# Patient Record
Sex: FEMALE | Race: BLACK OR AFRICAN AMERICAN | NOT HISPANIC OR LATINO | Employment: OTHER | URBAN - METROPOLITAN AREA
[De-identification: names, ages, dates, MRNs, and addresses within clinical notes are randomized per-mention and may not be internally consistent; named-entity substitution may affect disease eponyms.]

---

## 2023-01-01 ENCOUNTER — OFFICE VISIT (OUTPATIENT)
Dept: PRIMARY CARE | Facility: CLINIC | Age: 33
End: 2023-01-01
Payer: COMMERCIAL

## 2023-01-01 ENCOUNTER — TELEPHONE (OUTPATIENT)
Dept: TRANSPLANT | Facility: HOSPITAL | Age: 33
End: 2023-01-01
Payer: COMMERCIAL

## 2023-01-01 ENCOUNTER — HOSPITAL ENCOUNTER (OUTPATIENT)
Dept: DATA CONVERSION | Facility: HOSPITAL | Age: 33
End: 2023-06-14
Attending: INTERNAL MEDICINE | Admitting: INTERNAL MEDICINE
Payer: COMMERCIAL

## 2023-01-01 ENCOUNTER — LAB (OUTPATIENT)
Dept: LAB | Facility: LAB | Age: 33
End: 2023-01-01
Payer: COMMERCIAL

## 2023-01-01 ENCOUNTER — PHARMACY VISIT (OUTPATIENT)
Dept: PHARMACY | Facility: CLINIC | Age: 33
End: 2023-01-01
Payer: COMMERCIAL

## 2023-01-01 ENCOUNTER — OFFICE VISIT (OUTPATIENT)
Dept: RHEUMATOLOGY | Facility: CLINIC | Age: 33
End: 2023-01-01
Payer: COMMERCIAL

## 2023-01-01 ENCOUNTER — TELEPHONE (OUTPATIENT)
Dept: PHARMACY | Facility: HOSPITAL | Age: 33
End: 2023-01-01
Payer: COMMERCIAL

## 2023-01-01 ENCOUNTER — APPOINTMENT (OUTPATIENT)
Dept: TRANSPLANT | Facility: HOSPITAL | Age: 33
End: 2023-01-01

## 2023-01-01 ENCOUNTER — HOSPITAL ENCOUNTER (OUTPATIENT)
Dept: CARDIOLOGY | Facility: HOSPITAL | Age: 33
Discharge: HOME | End: 2023-11-29
Payer: COMMERCIAL

## 2023-01-01 ENCOUNTER — HOSPITAL ENCOUNTER (OUTPATIENT)
Dept: DATA CONVERSION | Facility: HOSPITAL | Age: 33
End: 2023-09-27
Attending: INTERNAL MEDICINE | Admitting: INTERNAL MEDICINE
Payer: COMMERCIAL

## 2023-01-01 ENCOUNTER — SPECIALTY PHARMACY (OUTPATIENT)
Dept: PHARMACY | Facility: CLINIC | Age: 33
End: 2023-01-01

## 2023-01-01 ENCOUNTER — OFFICE VISIT (OUTPATIENT)
Dept: TRANSPLANT | Facility: HOSPITAL | Age: 33
End: 2023-01-01
Payer: COMMERCIAL

## 2023-01-01 ENCOUNTER — APPOINTMENT (OUTPATIENT)
Dept: TRANSPLANT | Facility: HOSPITAL | Age: 33
End: 2023-01-01
Payer: COMMERCIAL

## 2023-01-01 ENCOUNTER — DOCUMENTATION (OUTPATIENT)
Dept: TRANSPLANT | Facility: HOSPITAL | Age: 33
End: 2023-01-01
Payer: COMMERCIAL

## 2023-01-01 ENCOUNTER — PHARMACY VISIT (OUTPATIENT)
Dept: PHARMACY | Facility: CLINIC | Age: 33
End: 2023-01-01

## 2023-01-01 ENCOUNTER — HOSPITAL ENCOUNTER (OUTPATIENT)
Facility: HOSPITAL | Age: 33
Discharge: HOME | End: 2023-11-29
Attending: INTERNAL MEDICINE | Admitting: INTERNAL MEDICINE
Payer: COMMERCIAL

## 2023-01-01 ENCOUNTER — LAB (OUTPATIENT)
Dept: LAB | Facility: HOSPITAL | Age: 33
End: 2023-01-01
Payer: COMMERCIAL

## 2023-01-01 ENCOUNTER — TELEPHONE (OUTPATIENT)
Dept: TRANSPLANT | Facility: HOSPITAL | Age: 33
End: 2023-01-01

## 2023-01-01 VITALS
SYSTOLIC BLOOD PRESSURE: 120 MMHG | BODY MASS INDEX: 38.7 KG/M2 | DIASTOLIC BLOOD PRESSURE: 88 MMHG | TEMPERATURE: 97.1 F | OXYGEN SATURATION: 99 % | HEART RATE: 100 BPM | WEIGHT: 204.8 LBS

## 2023-01-01 VITALS
WEIGHT: 201.2 LBS | HEART RATE: 69 BPM | OXYGEN SATURATION: 100 % | DIASTOLIC BLOOD PRESSURE: 70 MMHG | TEMPERATURE: 97.6 F | SYSTOLIC BLOOD PRESSURE: 111 MMHG | BODY MASS INDEX: 38.02 KG/M2

## 2023-01-01 VITALS
HEIGHT: 61 IN | WEIGHT: 203 LBS | SYSTOLIC BLOOD PRESSURE: 112 MMHG | BODY MASS INDEX: 38.33 KG/M2 | DIASTOLIC BLOOD PRESSURE: 86 MMHG | HEART RATE: 111 BPM

## 2023-01-01 VITALS
OXYGEN SATURATION: 95 % | BODY MASS INDEX: 37.73 KG/M2 | SYSTOLIC BLOOD PRESSURE: 111 MMHG | HEART RATE: 110 BPM | DIASTOLIC BLOOD PRESSURE: 84 MMHG | TEMPERATURE: 97.2 F | WEIGHT: 205 LBS | HEIGHT: 62 IN

## 2023-01-01 DIAGNOSIS — E78.5 HYPERLIPIDEMIA, UNSPECIFIED: ICD-10-CM

## 2023-01-01 DIAGNOSIS — Z12.4 CERVICAL CANCER SCREENING: ICD-10-CM

## 2023-01-01 DIAGNOSIS — D84.9 IMMUNOSUPPRESSION (MULTI): Primary | ICD-10-CM

## 2023-01-01 DIAGNOSIS — T86.21 HEART TRANSPLANT REJECTION (MULTI): ICD-10-CM

## 2023-01-01 DIAGNOSIS — B37.31 YEAST VAGINITIS: ICD-10-CM

## 2023-01-01 DIAGNOSIS — Z94.1 HEART REPLACED BY TRANSPLANT (MULTI): ICD-10-CM

## 2023-01-01 DIAGNOSIS — R35.0 URINARY FREQUENCY: ICD-10-CM

## 2023-01-01 DIAGNOSIS — Z48.21 ENCOUNTER FOR AFTERCARE FOLLOWING HEART TRANSPLANT (MULTI): ICD-10-CM

## 2023-01-01 DIAGNOSIS — Z94.1 HEART TRANSPLANT STATUS (MULTI): ICD-10-CM

## 2023-01-01 DIAGNOSIS — N39.0 URINARY TRACT INFECTION WITHOUT HEMATURIA, SITE UNSPECIFIED: ICD-10-CM

## 2023-01-01 DIAGNOSIS — I10 ESSENTIAL HYPERTENSION: ICD-10-CM

## 2023-01-01 DIAGNOSIS — M06.9 RHEUMATOID ARTHRITIS, INVOLVING UNSPECIFIED SITE, UNSPECIFIED WHETHER RHEUMATOID FACTOR PRESENT (MULTI): Primary | ICD-10-CM

## 2023-01-01 DIAGNOSIS — Z94.1 HEART TRANSPLANT STATUS (MULTI): Primary | ICD-10-CM

## 2023-01-01 DIAGNOSIS — Z23 IMMUNIZATION DUE: ICD-10-CM

## 2023-01-01 DIAGNOSIS — Z82.49 FAMILY HISTORY OF ISCHEMIC HEART DISEASE AND OTHER DISEASES OF THE CIRCULATORY SYSTEM: ICD-10-CM

## 2023-01-01 DIAGNOSIS — I10 ESSENTIAL (PRIMARY) HYPERTENSION: ICD-10-CM

## 2023-01-01 DIAGNOSIS — Z94.1 HEART REPLACED BY TRANSPLANT (MULTI): Primary | ICD-10-CM

## 2023-01-01 DIAGNOSIS — Z00.00 HEALTHCARE MAINTENANCE: Primary | ICD-10-CM

## 2023-01-01 DIAGNOSIS — G89.29 CHRONIC IDIOPATHIC PAIN SYNDROME: Primary | ICD-10-CM

## 2023-01-01 DIAGNOSIS — E66.9 OBESITY (BMI 30-39.9): ICD-10-CM

## 2023-01-01 LAB
ADRENOCORTICOTROPIC HORMONE: 19.5 PG/ML (ref 7.2–63.3)
ALANINE AMINOTRANSFERASE (SGPT) (U/L) IN SER/PLAS: 6 U/L (ref 7–45)
ALANINE AMINOTRANSFERASE (SGPT) (U/L) IN SER/PLAS: 7 U/L (ref 7–45)
ALANINE AMINOTRANSFERASE (SGPT) (U/L) IN SER/PLAS: 8 U/L (ref 7–45)
ALBUMIN (G/DL) IN SER/PLAS: 3.7 G/DL (ref 3.4–5)
ALBUMIN (G/DL) IN SER/PLAS: 3.7 G/DL (ref 3.4–5)
ALBUMIN (G/DL) IN SER/PLAS: 3.9 G/DL (ref 3.4–5)
ALBUMIN (G/DL) IN SER/PLAS: 3.9 G/DL (ref 3.4–5)
ALBUMIN (G/DL) IN SER/PLAS: 4 G/DL (ref 3.4–5)
ALBUMIN (G/DL) IN SER/PLAS: 4.1 G/DL (ref 3.4–5)
ALBUMIN (G/DL) IN SER/PLAS: 4.2 G/DL (ref 3.4–5)
ALBUMIN (G/DL) IN SER/PLAS: 4.3 G/DL (ref 3.4–5)
ALBUMIN (G/DL) IN SER/PLAS: 4.3 G/DL (ref 3.4–5)
ALBUMIN (G/DL) IN SER/PLAS: 4.4 G/DL (ref 3.4–5)
ALBUMIN SERPL BCP-MCNC: 3.2 G/DL (ref 3.4–5)
ALBUMIN SERPL BCP-MCNC: 3.9 G/DL (ref 3.4–5)
ALBUMIN SERPL BCP-MCNC: 4.2 G/DL (ref 3.4–5)
ALBUMIN SERPL BCP-MCNC: 4.3 G/DL (ref 3.4–5)
ALKALINE PHOSPHATASE (U/L) IN SER/PLAS: 38 U/L (ref 33–110)
ALKALINE PHOSPHATASE (U/L) IN SER/PLAS: 42 U/L (ref 33–110)
ALKALINE PHOSPHATASE (U/L) IN SER/PLAS: 44 U/L (ref 33–110)
ALKALINE PHOSPHATASE (U/L) IN SER/PLAS: 48 U/L (ref 33–110)
ALKALINE PHOSPHATASE (U/L) IN SER/PLAS: 49 U/L (ref 33–110)
ALKALINE PHOSPHATASE (U/L) IN SER/PLAS: 50 U/L (ref 33–110)
ALKALINE PHOSPHATASE (U/L) IN SER/PLAS: 50 U/L (ref 33–110)
ALKALINE PHOSPHATASE (U/L) IN SER/PLAS: 55 U/L (ref 33–110)
ALKALINE PHOSPHATASE (U/L) IN SER/PLAS: 57 U/L (ref 33–110)
ALP SERPL-CCNC: 43 U/L (ref 33–110)
ALP SERPL-CCNC: 44 U/L (ref 33–110)
ALP SERPL-CCNC: 46 U/L (ref 33–110)
ALP SERPL-CCNC: 50 U/L (ref 33–110)
ALT SERPL W P-5'-P-CCNC: 7 U/L (ref 7–45)
ALT SERPL W P-5'-P-CCNC: 8 U/L (ref 7–45)
ALT SERPL W P-5'-P-CCNC: 9 U/L (ref 7–45)
ALT SERPL W P-5'-P-CCNC: 9 U/L (ref 7–45)
ANION GAP IN SER/PLAS: 10 MMOL/L (ref 10–20)
ANION GAP IN SER/PLAS: 11 MMOL/L (ref 10–20)
ANION GAP IN SER/PLAS: 12 MMOL/L (ref 10–20)
ANION GAP IN SER/PLAS: 13 MMOL/L (ref 10–20)
ANION GAP IN SER/PLAS: 13 MMOL/L (ref 10–20)
ANION GAP IN SER/PLAS: 14 MMOL/L (ref 10–20)
ANION GAP IN SER/PLAS: 15 MMOL/L (ref 10–20)
ANION GAP SERPL CALC-SCNC: 12 MMOL/L (ref 10–20)
ANION GAP SERPL CALC-SCNC: 12 MMOL/L (ref 10–20)
ANION GAP SERPL CALC-SCNC: 13 MMOL/L (ref 10–20)
ANION GAP SERPL CALC-SCNC: 15 MMOL/L (ref 10–20)
AORTIC VALVE PEAK VELOCITY: 1.08
APPEARANCE UR: ABNORMAL
ASPARTATE AMINOTRANSFERASE (SGOT) (U/L) IN SER/PLAS: 10 U/L (ref 9–39)
ASPARTATE AMINOTRANSFERASE (SGOT) (U/L) IN SER/PLAS: 11 U/L (ref 9–39)
ASPARTATE AMINOTRANSFERASE (SGOT) (U/L) IN SER/PLAS: 12 U/L (ref 9–39)
ASPARTATE AMINOTRANSFERASE (SGOT) (U/L) IN SER/PLAS: 13 U/L (ref 9–39)
ASPARTATE AMINOTRANSFERASE (SGOT) (U/L) IN SER/PLAS: 15 U/L (ref 9–39)
AST SERPL W P-5'-P-CCNC: 12 U/L (ref 9–39)
AST SERPL W P-5'-P-CCNC: 13 U/L (ref 9–39)
AST SERPL W P-5'-P-CCNC: 14 U/L (ref 9–39)
AST SERPL W P-5'-P-CCNC: 16 U/L (ref 9–39)
ATRIAL RATE: 80 BPM
AV PEAK GRADIENT: 4.7
AVA (PEAK VEL): 1.88
BACTERIA #/AREA URNS AUTO: ABNORMAL /HPF
BASOPHILS # BLD AUTO: 0 X10*3/UL (ref 0–0.1)
BASOPHILS # BLD AUTO: 0.03 X10*3/UL (ref 0–0.1)
BASOPHILS # BLD AUTO: 0.04 X10*3/UL (ref 0–0.1)
BASOPHILS (10*3/UL) IN BLOOD BY AUTOMATED COUNT: 0.01 X10E9/L (ref 0–0.1)
BASOPHILS (10*3/UL) IN BLOOD BY AUTOMATED COUNT: 0.01 X10E9/L (ref 0–0.1)
BASOPHILS (10*3/UL) IN BLOOD BY AUTOMATED COUNT: 0.02 X10E9/L (ref 0–0.1)
BASOPHILS (10*3/UL) IN BLOOD BY AUTOMATED COUNT: 0.02 X10E9/L (ref 0–0.1)
BASOPHILS (10*3/UL) IN BLOOD BY AUTOMATED COUNT: 0.03 X10E9/L (ref 0–0.1)
BASOPHILS NFR BLD AUTO: 0 %
BASOPHILS NFR BLD AUTO: 0.7 %
BASOPHILS NFR BLD AUTO: 1.2 %
BASOPHILS/100 LEUKOCYTES IN BLOOD BY AUTOMATED COUNT: 0.2 % (ref 0–2)
BASOPHILS/100 LEUKOCYTES IN BLOOD BY AUTOMATED COUNT: 0.2 % (ref 0–2)
BASOPHILS/100 LEUKOCYTES IN BLOOD BY AUTOMATED COUNT: 0.6 % (ref 0–2)
BASOPHILS/100 LEUKOCYTES IN BLOOD BY AUTOMATED COUNT: 0.6 % (ref 0–2)
BASOPHILS/100 LEUKOCYTES IN BLOOD BY AUTOMATED COUNT: 0.7 % (ref 0–2)
BASOPHILS/100 LEUKOCYTES IN BLOOD BY AUTOMATED COUNT: 0.7 % (ref 0–2)
BASOPHILS/100 LEUKOCYTES IN BLOOD BY AUTOMATED COUNT: 0.9 % (ref 0–2)
BILIRUB SERPL-MCNC: 0.3 MG/DL (ref 0–1.2)
BILIRUB SERPL-MCNC: 0.3 MG/DL (ref 0–1.2)
BILIRUB SERPL-MCNC: 0.4 MG/DL (ref 0–1.2)
BILIRUB SERPL-MCNC: 0.4 MG/DL (ref 0–1.2)
BILIRUB UR STRIP.AUTO-MCNC: NEGATIVE MG/DL
BILIRUBIN TOTAL (MG/DL) IN SER/PLAS: 0.2 MG/DL (ref 0–1.2)
BILIRUBIN TOTAL (MG/DL) IN SER/PLAS: 0.2 MG/DL (ref 0–1.2)
BILIRUBIN TOTAL (MG/DL) IN SER/PLAS: 0.3 MG/DL (ref 0–1.2)
BILIRUBIN TOTAL (MG/DL) IN SER/PLAS: 0.4 MG/DL (ref 0–1.2)
BUN SERPL-MCNC: 13 MG/DL (ref 6–23)
BUN SERPL-MCNC: 14 MG/DL (ref 6–23)
BUN SERPL-MCNC: 15 MG/DL (ref 6–23)
BUN SERPL-MCNC: 18 MG/DL (ref 6–23)
C TRACH RRNA SPEC QL NAA+PROBE: NEGATIVE
CALCIUM (MG/DL) IN SER/PLAS: 8.8 MG/DL (ref 8.6–10.6)
CALCIUM (MG/DL) IN SER/PLAS: 8.8 MG/DL (ref 8.6–10.6)
CALCIUM (MG/DL) IN SER/PLAS: 8.9 MG/DL (ref 8.6–10.6)
CALCIUM (MG/DL) IN SER/PLAS: 9 MG/DL (ref 8.6–10.6)
CALCIUM (MG/DL) IN SER/PLAS: 9.2 MG/DL (ref 8.6–10.6)
CALCIUM (MG/DL) IN SER/PLAS: 9.4 MG/DL (ref 8.6–10.6)
CALCIUM (MG/DL) IN SER/PLAS: 9.4 MG/DL (ref 8.6–10.6)
CALCIUM (MG/DL) IN SER/PLAS: 9.7 MG/DL (ref 8.6–10.6)
CALCIUM SERPL-MCNC: 7.4 MG/DL (ref 8.6–10.6)
CALCIUM SERPL-MCNC: 9 MG/DL (ref 8.6–10.6)
CALCIUM SERPL-MCNC: 9.3 MG/DL (ref 8.6–10.3)
CALCIUM SERPL-MCNC: 9.8 MG/DL (ref 8.6–10.6)
CARBON DIOXIDE, TOTAL (MMOL/L) IN SER/PLAS: 24 MMOL/L (ref 21–32)
CARBON DIOXIDE, TOTAL (MMOL/L) IN SER/PLAS: 25 MMOL/L (ref 21–32)
CARBON DIOXIDE, TOTAL (MMOL/L) IN SER/PLAS: 26 MMOL/L (ref 21–32)
CARBON DIOXIDE, TOTAL (MMOL/L) IN SER/PLAS: 27 MMOL/L (ref 21–32)
CARBON DIOXIDE, TOTAL (MMOL/L) IN SER/PLAS: 27 MMOL/L (ref 21–32)
CARBON DIOXIDE, TOTAL (MMOL/L) IN SER/PLAS: 28 MMOL/L (ref 21–32)
CARBON DIOXIDE, TOTAL (MMOL/L) IN SER/PLAS: 29 MMOL/L (ref 21–32)
CARBON DIOXIDE, TOTAL (MMOL/L) IN SER/PLAS: 30 MMOL/L (ref 21–32)
CHLORIDE (MMOL/L) IN SER/PLAS: 102 MMOL/L (ref 98–107)
CHLORIDE (MMOL/L) IN SER/PLAS: 103 MMOL/L (ref 98–107)
CHLORIDE (MMOL/L) IN SER/PLAS: 104 MMOL/L (ref 98–107)
CHLORIDE (MMOL/L) IN SER/PLAS: 105 MMOL/L (ref 98–107)
CHLORIDE (MMOL/L) IN SER/PLAS: 105 MMOL/L (ref 98–107)
CHLORIDE (MMOL/L) IN SER/PLAS: 106 MMOL/L (ref 98–107)
CHLORIDE SERPL-SCNC: 102 MMOL/L (ref 98–107)
CHLORIDE SERPL-SCNC: 103 MMOL/L (ref 98–107)
CHLORIDE SERPL-SCNC: 105 MMOL/L (ref 98–107)
CHLORIDE SERPL-SCNC: 109 MMOL/L (ref 98–107)
CHOLESTEROL (MG/DL) IN SER/PLAS: 279 MG/DL (ref 0–199)
CHOLESTEROL IN HDL (MG/DL) IN SER/PLAS: 63.4 MG/DL
CHOLESTEROL/HDL RATIO: 4.4
CO2 SERPL-SCNC: 23 MMOL/L (ref 21–32)
CO2 SERPL-SCNC: 25 MMOL/L (ref 21–32)
CO2 SERPL-SCNC: 27 MMOL/L (ref 21–32)
CO2 SERPL-SCNC: 27 MMOL/L (ref 21–32)
COLOR UR: YELLOW
COMPLETE PATHOLOGY REPORT: NORMAL
COMPLETE PATHOLOGY REPORT: NORMAL
CONVERTED CLINICAL DIAGNOSIS-HISTORY: NORMAL
CONVERTED CLINICAL DIAGNOSIS-HISTORY: NORMAL
CONVERTED FINAL DIAGNOSIS: NORMAL
CONVERTED FINAL DIAGNOSIS: NORMAL
CONVERTED FINAL REPORT PDF LINK TO COPY AND PASTE: NORMAL
CONVERTED FINAL REPORT PDF LINK TO COPY AND PASTE: NORMAL
CONVERTED GROSS DESCRIPTION: NORMAL
CONVERTED GROSS DESCRIPTION: NORMAL
CORTISOL (UG/DL) IN SERUM - AM: 1.7 UG/DL (ref 5–20)
CREAT SERPL-MCNC: 0.84 MG/DL (ref 0.5–1.05)
CREAT SERPL-MCNC: 1.01 MG/DL (ref 0.5–1.05)
CREAT SERPL-MCNC: 1.17 MG/DL (ref 0.5–1.05)
CREAT SERPL-MCNC: 1.19 MG/DL (ref 0.5–1.05)
CREAT UR-MCNC: 194.3 MG/DL (ref 20–320)
CREAT UR-MCNC: 195.9 MG/DL (ref 20–320)
CREATININE (MG/DL) IN SER/PLAS: 0.98 MG/DL (ref 0.5–1.05)
CREATININE (MG/DL) IN SER/PLAS: 1.03 MG/DL (ref 0.5–1.05)
CREATININE (MG/DL) IN SER/PLAS: 1.1 MG/DL (ref 0.5–1.05)
CREATININE (MG/DL) IN SER/PLAS: 1.2 MG/DL (ref 0.5–1.05)
CREATININE (MG/DL) IN SER/PLAS: 1.22 MG/DL (ref 0.5–1.05)
CREATININE (MG/DL) IN SER/PLAS: 1.26 MG/DL (ref 0.5–1.05)
CREATININE (MG/DL) IN SER/PLAS: 1.28 MG/DL (ref 0.5–1.05)
CREATININE (MG/DL) IN SER/PLAS: 1.3 MG/DL (ref 0.5–1.05)
CREATININE (MG/DL) IN SER/PLAS: 1.34 MG/DL (ref 0.5–1.05)
CREATININE (MG/DL) IN SER/PLAS: 1.35 MG/DL (ref 0.5–1.05)
CREATININE (MG/DL) IN SER/PLAS: 1.36 MG/DL (ref 0.5–1.05)
CREATININE (MG/DL) IN SER/PLAS: 1.37 MG/DL (ref 0.5–1.05)
CREATININE (MG/DL) IN URINE: 191 MG/DL (ref 20–320)
CYTOLOGY CMNT CVX/VAG CYTO-IMP: NORMAL
CYTOMEGALOVIRUS DNA, PCR COMMENT: NORMAL
CYTOMEGALOVIRUS DNA, PCR IU/ML: NOT DETECTED IU/ML
CYTOMEGALOVIRUS DNA, PCR LOG IU/ML: NORMAL LOG IU/ML
EJECTION FRACTION APICAL 4 CHAMBER: 59.2
EJECTION FRACTION: 55
EOSINOPHIL # BLD AUTO: 0 X10*3/UL (ref 0–0.7)
EOSINOPHIL # BLD AUTO: 0.04 X10*3/UL (ref 0–0.7)
EOSINOPHIL # BLD AUTO: 0.12 X10*3/UL (ref 0–0.7)
EOSINOPHIL NFR BLD AUTO: 0 %
EOSINOPHIL NFR BLD AUTO: 1.2 %
EOSINOPHIL NFR BLD AUTO: 2.6 %
EOSINOPHILS (10*3/UL) IN BLOOD BY AUTOMATED COUNT: 0 X10E9/L (ref 0–0.7)
EOSINOPHILS (10*3/UL) IN BLOOD BY AUTOMATED COUNT: 0.03 X10E9/L (ref 0–0.7)
EOSINOPHILS (10*3/UL) IN BLOOD BY AUTOMATED COUNT: 0.06 X10E9/L (ref 0–0.7)
EOSINOPHILS (10*3/UL) IN BLOOD BY AUTOMATED COUNT: 0.06 X10E9/L (ref 0–0.7)
EOSINOPHILS (10*3/UL) IN BLOOD BY AUTOMATED COUNT: 0.07 X10E9/L (ref 0–0.7)
EOSINOPHILS (10*3/UL) IN BLOOD BY AUTOMATED COUNT: 0.1 X10E9/L (ref 0–0.7)
EOSINOPHILS (10*3/UL) IN BLOOD BY AUTOMATED COUNT: 0.11 X10E9/L (ref 0–0.7)
EOSINOPHILS/100 LEUKOCYTES IN BLOOD BY AUTOMATED COUNT: 0 % (ref 0–6)
EOSINOPHILS/100 LEUKOCYTES IN BLOOD BY AUTOMATED COUNT: 0.6 % (ref 0–6)
EOSINOPHILS/100 LEUKOCYTES IN BLOOD BY AUTOMATED COUNT: 1.4 % (ref 0–6)
EOSINOPHILS/100 LEUKOCYTES IN BLOOD BY AUTOMATED COUNT: 1.6 % (ref 0–6)
EOSINOPHILS/100 LEUKOCYTES IN BLOOD BY AUTOMATED COUNT: 1.7 % (ref 0–6)
EOSINOPHILS/100 LEUKOCYTES IN BLOOD BY AUTOMATED COUNT: 2.9 % (ref 0–6)
EOSINOPHILS/100 LEUKOCYTES IN BLOOD BY AUTOMATED COUNT: 3.3 % (ref 0–6)
ERYTHROCYTE DISTRIBUTION WIDTH (RATIO) BY AUTOMATED COUNT: 14.9 % (ref 11.5–14.5)
ERYTHROCYTE DISTRIBUTION WIDTH (RATIO) BY AUTOMATED COUNT: 15.6 % (ref 11.5–14.5)
ERYTHROCYTE DISTRIBUTION WIDTH (RATIO) BY AUTOMATED COUNT: 15.8 % (ref 11.5–14.5)
ERYTHROCYTE DISTRIBUTION WIDTH (RATIO) BY AUTOMATED COUNT: 16.8 % (ref 11.5–14.5)
ERYTHROCYTE DISTRIBUTION WIDTH (RATIO) BY AUTOMATED COUNT: 17.2 % (ref 11.5–14.5)
ERYTHROCYTE DISTRIBUTION WIDTH (RATIO) BY AUTOMATED COUNT: 17.5 % (ref 11.5–14.5)
ERYTHROCYTE DISTRIBUTION WIDTH (RATIO) BY AUTOMATED COUNT: 18.1 % (ref 11.5–14.5)
ERYTHROCYTE MEAN CORPUSCULAR HEMOGLOBIN CONCENTRATION (G/DL) BY AUTOMATED: 29.8 G/DL (ref 32–36)
ERYTHROCYTE MEAN CORPUSCULAR HEMOGLOBIN CONCENTRATION (G/DL) BY AUTOMATED: 30 G/DL (ref 32–36)
ERYTHROCYTE MEAN CORPUSCULAR HEMOGLOBIN CONCENTRATION (G/DL) BY AUTOMATED: 30.2 G/DL (ref 32–36)
ERYTHROCYTE MEAN CORPUSCULAR HEMOGLOBIN CONCENTRATION (G/DL) BY AUTOMATED: 30.2 G/DL (ref 32–36)
ERYTHROCYTE MEAN CORPUSCULAR HEMOGLOBIN CONCENTRATION (G/DL) BY AUTOMATED: 30.8 G/DL (ref 32–36)
ERYTHROCYTE MEAN CORPUSCULAR HEMOGLOBIN CONCENTRATION (G/DL) BY AUTOMATED: 32 G/DL (ref 32–36)
ERYTHROCYTE MEAN CORPUSCULAR HEMOGLOBIN CONCENTRATION (G/DL) BY AUTOMATED: 32.2 G/DL (ref 32–36)
ERYTHROCYTE MEAN CORPUSCULAR VOLUME (FL) BY AUTOMATED COUNT: 82 FL (ref 80–100)
ERYTHROCYTE MEAN CORPUSCULAR VOLUME (FL) BY AUTOMATED COUNT: 82 FL (ref 80–100)
ERYTHROCYTE MEAN CORPUSCULAR VOLUME (FL) BY AUTOMATED COUNT: 83 FL (ref 80–100)
ERYTHROCYTE MEAN CORPUSCULAR VOLUME (FL) BY AUTOMATED COUNT: 83 FL (ref 80–100)
ERYTHROCYTE MEAN CORPUSCULAR VOLUME (FL) BY AUTOMATED COUNT: 84 FL (ref 80–100)
ERYTHROCYTE MEAN CORPUSCULAR VOLUME (FL) BY AUTOMATED COUNT: 84 FL (ref 80–100)
ERYTHROCYTE MEAN CORPUSCULAR VOLUME (FL) BY AUTOMATED COUNT: 85 FL (ref 80–100)
ERYTHROCYTE [DISTWIDTH] IN BLOOD BY AUTOMATED COUNT: 14.6 % (ref 11.5–14.5)
ERYTHROCYTES (10*6/UL) IN BLOOD BY AUTOMATED COUNT: 4.09 X10E12/L (ref 4–5.2)
ERYTHROCYTES (10*6/UL) IN BLOOD BY AUTOMATED COUNT: 4.27 X10E12/L (ref 4–5.2)
ERYTHROCYTES (10*6/UL) IN BLOOD BY AUTOMATED COUNT: 4.3 X10E12/L (ref 4–5.2)
ERYTHROCYTES (10*6/UL) IN BLOOD BY AUTOMATED COUNT: 4.3 X10E12/L (ref 4–5.2)
ERYTHROCYTES (10*6/UL) IN BLOOD BY AUTOMATED COUNT: 4.32 X10E12/L (ref 4–5.2)
ERYTHROCYTES (10*6/UL) IN BLOOD BY AUTOMATED COUNT: 4.32 X10E12/L (ref 4–5.2)
ERYTHROCYTES (10*6/UL) IN BLOOD BY AUTOMATED COUNT: 4.5 X10E12/L (ref 4–5.2)
GFR FEMALE: 53 ML/MIN/1.73M2
GFR FEMALE: 54 ML/MIN/1.73M2
GFR FEMALE: 56 ML/MIN/1.73M2
GFR FEMALE: 57 ML/MIN/1.73M2
GFR FEMALE: 58 ML/MIN/1.73M2
GFR FEMALE: 60 ML/MIN/1.73M2
GFR FEMALE: 61 ML/MIN/1.73M2
GFR FEMALE: 68 ML/MIN/1.73M2
GFR FEMALE: 74 ML/MIN/1.73M2
GFR FEMALE: 79 ML/MIN/1.73M2
GFR SERPL CREATININE-BSD FRML MDRD: 62 ML/MIN/1.73M*2
GFR SERPL CREATININE-BSD FRML MDRD: 64 ML/MIN/1.73M*2
GFR SERPL CREATININE-BSD FRML MDRD: 76 ML/MIN/1.73M*2
GFR SERPL CREATININE-BSD FRML MDRD: >90 ML/MIN/1.73M*2
GLUCOSE (MG/DL) IN SER/PLAS: 101 MG/DL (ref 74–99)
GLUCOSE (MG/DL) IN SER/PLAS: 106 MG/DL (ref 74–99)
GLUCOSE (MG/DL) IN SER/PLAS: 134 MG/DL (ref 74–99)
GLUCOSE (MG/DL) IN SER/PLAS: 70 MG/DL (ref 74–99)
GLUCOSE (MG/DL) IN SER/PLAS: 76 MG/DL (ref 74–99)
GLUCOSE (MG/DL) IN SER/PLAS: 82 MG/DL (ref 74–99)
GLUCOSE (MG/DL) IN SER/PLAS: 86 MG/DL (ref 74–99)
GLUCOSE (MG/DL) IN SER/PLAS: 87 MG/DL (ref 74–99)
GLUCOSE (MG/DL) IN SER/PLAS: 89 MG/DL (ref 74–99)
GLUCOSE (MG/DL) IN SER/PLAS: 92 MG/DL (ref 74–99)
GLUCOSE (MG/DL) IN SER/PLAS: 93 MG/DL (ref 74–99)
GLUCOSE (MG/DL) IN SER/PLAS: 93 MG/DL (ref 74–99)
GLUCOSE SERPL-MCNC: 101 MG/DL (ref 74–99)
GLUCOSE SERPL-MCNC: 102 MG/DL (ref 74–99)
GLUCOSE SERPL-MCNC: 91 MG/DL (ref 74–99)
GLUCOSE SERPL-MCNC: 96 MG/DL (ref 74–99)
GLUCOSE UR STRIP.AUTO-MCNC: NEGATIVE MG/DL
HCT VFR BLD AUTO: 34 % (ref 36–46)
HCT VFR BLD AUTO: 35.9 % (ref 36–46)
HCT VFR BLD AUTO: 39.1 % (ref 36–46)
HEMATOCRIT (%) IN BLOOD BY AUTOMATED COUNT: 33.4 % (ref 36–46)
HEMATOCRIT (%) IN BLOOD BY AUTOMATED COUNT: 35.3 % (ref 36–46)
HEMATOCRIT (%) IN BLOOD BY AUTOMATED COUNT: 35.7 % (ref 36–46)
HEMATOCRIT (%) IN BLOOD BY AUTOMATED COUNT: 36 % (ref 36–46)
HEMATOCRIT (%) IN BLOOD BY AUTOMATED COUNT: 36.2 % (ref 36–46)
HEMATOCRIT (%) IN BLOOD BY AUTOMATED COUNT: 36.2 % (ref 36–46)
HEMATOCRIT (%) IN BLOOD BY AUTOMATED COUNT: 37.4 % (ref 36–46)
HEMOGLOBIN (G/DL) IN BLOOD: 10.1 G/DL (ref 12–16)
HEMOGLOBIN (G/DL) IN BLOOD: 10.6 G/DL (ref 12–16)
HEMOGLOBIN (G/DL) IN BLOOD: 10.8 G/DL (ref 12–16)
HEMOGLOBIN (G/DL) IN BLOOD: 11 G/DL (ref 12–16)
HEMOGLOBIN (G/DL) IN BLOOD: 11.3 G/DL (ref 12–16)
HEMOGLOBIN (G/DL) IN BLOOD: 11.6 G/DL (ref 12–16)
HEMOGLOBIN (G/DL) IN BLOOD: 11.6 G/DL (ref 12–16)
HGB BLD-MCNC: 10.4 G/DL (ref 12–16)
HGB BLD-MCNC: 10.9 G/DL (ref 12–16)
HGB BLD-MCNC: 12.4 G/DL (ref 12–16)
HPV HR 12 DNA GENITAL QL NAA+PROBE: NEGATIVE
HPV HR GENOTYPES PNL CVX NAA+PROBE: NEGATIVE
HPV16 DNA SPEC QL NAA+PROBE: NEGATIVE
HPV18 DNA SPEC QL NAA+PROBE: NEGATIVE
IGG (MG/DL) IN SER/PLAS: 1780 MG/DL (ref 700–1600)
IMM GRANULOCYTES # BLD AUTO: 0.01 X10*3/UL (ref 0–0.7)
IMM GRANULOCYTES # BLD AUTO: 0.01 X10*3/UL (ref 0–0.7)
IMM GRANULOCYTES # BLD AUTO: 0.02 X10*3/UL (ref 0–0.7)
IMM GRANULOCYTES NFR BLD AUTO: 0.3 % (ref 0–0.9)
IMM GRANULOCYTES NFR BLD AUTO: 0.3 % (ref 0–0.9)
IMM GRANULOCYTES NFR BLD AUTO: 0.4 % (ref 0–0.9)
IMMATURE GRANULOCYTES/100 LEUKOCYTES IN BLOOD BY AUTOMATED COUNT: 0 % (ref 0–0.9)
IMMATURE GRANULOCYTES/100 LEUKOCYTES IN BLOOD BY AUTOMATED COUNT: 0.2 % (ref 0–0.9)
IMMATURE GRANULOCYTES/100 LEUKOCYTES IN BLOOD BY AUTOMATED COUNT: 0.2 % (ref 0–0.9)
IMMATURE GRANULOCYTES/100 LEUKOCYTES IN BLOOD BY AUTOMATED COUNT: 0.3 % (ref 0–0.9)
IMMATURE GRANULOCYTES/100 LEUKOCYTES IN BLOOD BY AUTOMATED COUNT: 0.3 % (ref 0–0.9)
IMMATURE GRANULOCYTES/100 LEUKOCYTES IN BLOOD BY AUTOMATED COUNT: 0.5 % (ref 0–0.9)
IMMATURE GRANULOCYTES/100 LEUKOCYTES IN BLOOD BY AUTOMATED COUNT: 0.6 % (ref 0–0.9)
KETONES UR STRIP.AUTO-MCNC: NEGATIVE MG/DL
LAB AP ASR DISCLAIMER: NORMAL
LAB AP HPV GENOTYPE QUESTION: YES
LAB AP HPV HR: NORMAL
LAB AP PAP ADDITIONAL TESTS: NORMAL
LAB AP PREVIOUS ABNORMAL HISTORY: NORMAL
LAB AP TREATMENT HISTORY: NORMAL
LABORATORY COMMENT REPORT: NORMAL
LDL: 184 MG/DL (ref 0–99)
LEFT ATRIUM VOLUME AREA LENGTH INDEX BSA: 35.6
LEFT VENTRICLE INTERNAL DIMENSION DIASTOLE: 4.3 (ref 3.5–6)
LEFT VENTRICULAR OUTFLOW TRACT DIAMETER: 1.7
LEUKOCYTE ESTERASE UR QL STRIP.AUTO: ABNORMAL
LEUKOCYTES (10*3/UL) IN BLOOD BY AUTOMATED COUNT: 3.4 X10E9/L (ref 4.4–11.3)
LEUKOCYTES (10*3/UL) IN BLOOD BY AUTOMATED COUNT: 3.4 X10E9/L (ref 4.4–11.3)
LEUKOCYTES (10*3/UL) IN BLOOD BY AUTOMATED COUNT: 3.5 X10E9/L (ref 4.4–11.3)
LEUKOCYTES (10*3/UL) IN BLOOD BY AUTOMATED COUNT: 4.2 X10E9/L (ref 4.4–11.3)
LEUKOCYTES (10*3/UL) IN BLOOD BY AUTOMATED COUNT: 4.3 X10E9/L (ref 4.4–11.3)
LEUKOCYTES (10*3/UL) IN BLOOD BY AUTOMATED COUNT: 4.3 X10E9/L (ref 4.4–11.3)
LEUKOCYTES (10*3/UL) IN BLOOD BY AUTOMATED COUNT: 4.8 X10E9/L (ref 4.4–11.3)
LYMPHOCYTES # BLD AUTO: 0.85 X10*3/UL (ref 1.2–4.8)
LYMPHOCYTES # BLD AUTO: 1.02 X10*3/UL (ref 1.2–4.8)
LYMPHOCYTES # BLD AUTO: 1.26 X10*3/UL (ref 1.2–4.8)
LYMPHOCYTES (10*3/UL) IN BLOOD BY AUTOMATED COUNT: 0.79 X10E9/L (ref 1.2–4.8)
LYMPHOCYTES (10*3/UL) IN BLOOD BY AUTOMATED COUNT: 1 X10E9/L (ref 1.2–4.8)
LYMPHOCYTES (10*3/UL) IN BLOOD BY AUTOMATED COUNT: 1.13 X10E9/L (ref 1.2–4.8)
LYMPHOCYTES (10*3/UL) IN BLOOD BY AUTOMATED COUNT: 1.47 X10E9/L (ref 1.2–4.8)
LYMPHOCYTES (10*3/UL) IN BLOOD BY AUTOMATED COUNT: 1.6 X10E9/L (ref 1.2–4.8)
LYMPHOCYTES (10*3/UL) IN BLOOD BY AUTOMATED COUNT: 1.7 X10E9/L (ref 1.2–4.8)
LYMPHOCYTES (10*3/UL) IN BLOOD BY AUTOMATED COUNT: 1.71 X10E9/L (ref 1.2–4.8)
LYMPHOCYTES NFR BLD AUTO: 22.1 %
LYMPHOCYTES NFR BLD AUTO: 27.4 %
LYMPHOCYTES NFR BLD AUTO: 37.6 %
LYMPHOCYTES/100 LEUKOCYTES IN BLOOD BY AUTOMATED COUNT: 18.5 % (ref 13–44)
LYMPHOCYTES/100 LEUKOCYTES IN BLOOD BY AUTOMATED COUNT: 29.7 % (ref 13–44)
LYMPHOCYTES/100 LEUKOCYTES IN BLOOD BY AUTOMATED COUNT: 32.7 % (ref 13–44)
LYMPHOCYTES/100 LEUKOCYTES IN BLOOD BY AUTOMATED COUNT: 34.3 % (ref 13–44)
LYMPHOCYTES/100 LEUKOCYTES IN BLOOD BY AUTOMATED COUNT: 35.3 % (ref 13–44)
LYMPHOCYTES/100 LEUKOCYTES IN BLOOD BY AUTOMATED COUNT: 38.3 % (ref 13–44)
LYMPHOCYTES/100 LEUKOCYTES IN BLOOD BY AUTOMATED COUNT: 49.6 % (ref 13–44)
MAGNESIUM (MG/DL) IN SER/PLAS: 1.4 MG/DL (ref 1.6–2.4)
MAGNESIUM (MG/DL) IN SER/PLAS: 1.57 MG/DL (ref 1.6–2.4)
MAGNESIUM (MG/DL) IN SER/PLAS: 1.6 MG/DL (ref 1.6–2.4)
MAGNESIUM (MG/DL) IN SER/PLAS: 1.69 MG/DL (ref 1.6–2.4)
MAGNESIUM (MG/DL) IN SER/PLAS: 1.7 MG/DL (ref 1.6–2.4)
MAGNESIUM (MG/DL) IN SER/PLAS: 1.76 MG/DL (ref 1.6–2.4)
MAGNESIUM (MG/DL) IN SER/PLAS: 1.77 MG/DL (ref 1.6–2.4)
MAGNESIUM (MG/DL) IN SER/PLAS: 1.78 MG/DL (ref 1.6–2.4)
MAGNESIUM (MG/DL) IN SER/PLAS: 1.79 MG/DL (ref 1.6–2.4)
MAGNESIUM (MG/DL) IN SER/PLAS: 1.79 MG/DL (ref 1.6–2.4)
MAGNESIUM (MG/DL) IN SER/PLAS: 1.89 MG/DL (ref 1.6–2.4)
MAGNESIUM (MG/DL) IN SER/PLAS: 1.9 MG/DL (ref 1.6–2.4)
MAGNESIUM SERPL-MCNC: 1.42 MG/DL (ref 1.6–2.4)
MAGNESIUM SERPL-MCNC: 1.73 MG/DL (ref 1.6–2.4)
MAGNESIUM SERPL-MCNC: 1.89 MG/DL (ref 1.6–2.4)
MAGNESIUM SERPL-MCNC: 1.89 MG/DL (ref 1.6–2.4)
MCH RBC QN AUTO: 26.1 PG (ref 26–34)
MCH RBC QN AUTO: 26.4 PG (ref 26–34)
MCH RBC QN AUTO: 26.5 PG (ref 26–34)
MCHC RBC AUTO-ENTMCNC: 30.4 G/DL (ref 32–36)
MCHC RBC AUTO-ENTMCNC: 30.6 G/DL (ref 32–36)
MCHC RBC AUTO-ENTMCNC: 31.7 G/DL (ref 32–36)
MCV RBC AUTO: 83 FL (ref 80–100)
MCV RBC AUTO: 85 FL (ref 80–100)
MCV RBC AUTO: 87 FL (ref 80–100)
MENSTRUAL HX REPORTED: NORMAL
MITRAL VALVE E/A RATIO: 1.51
MITRAL VALVE E/E' RATIO: 6.26
MONOCYTES # BLD AUTO: 0.22 X10*3/UL (ref 0.1–1)
MONOCYTES # BLD AUTO: 0.47 X10*3/UL (ref 0.1–1)
MONOCYTES # BLD AUTO: 0.83 X10*3/UL (ref 0.1–1)
MONOCYTES (10*3/UL) IN BLOOD BY AUTOMATED COUNT: 0.14 X10E9/L (ref 0.1–1)
MONOCYTES (10*3/UL) IN BLOOD BY AUTOMATED COUNT: 0.41 X10E9/L (ref 0.1–1)
MONOCYTES (10*3/UL) IN BLOOD BY AUTOMATED COUNT: 0.44 X10E9/L (ref 0.1–1)
MONOCYTES (10*3/UL) IN BLOOD BY AUTOMATED COUNT: 0.47 X10E9/L (ref 0.1–1)
MONOCYTES (10*3/UL) IN BLOOD BY AUTOMATED COUNT: 0.47 X10E9/L (ref 0.1–1)
MONOCYTES (10*3/UL) IN BLOOD BY AUTOMATED COUNT: 0.51 X10E9/L (ref 0.1–1)
MONOCYTES (10*3/UL) IN BLOOD BY AUTOMATED COUNT: 0.53 X10E9/L (ref 0.1–1)
MONOCYTES NFR BLD AUTO: 14 %
MONOCYTES NFR BLD AUTO: 18 %
MONOCYTES NFR BLD AUTO: 7.1 %
MONOCYTES/100 LEUKOCYTES IN BLOOD BY AUTOMATED COUNT: 11 % (ref 2–10)
MONOCYTES/100 LEUKOCYTES IN BLOOD BY AUTOMATED COUNT: 11 % (ref 2–10)
MONOCYTES/100 LEUKOCYTES IN BLOOD BY AUTOMATED COUNT: 11.8 % (ref 2–10)
MONOCYTES/100 LEUKOCYTES IN BLOOD BY AUTOMATED COUNT: 12.2 % (ref 2–10)
MONOCYTES/100 LEUKOCYTES IN BLOOD BY AUTOMATED COUNT: 12.8 % (ref 2–10)
MONOCYTES/100 LEUKOCYTES IN BLOOD BY AUTOMATED COUNT: 13.9 % (ref 2–10)
MONOCYTES/100 LEUKOCYTES IN BLOOD BY AUTOMATED COUNT: 3.3 % (ref 2–10)
MUCOUS THREADS #/AREA URNS AUTO: ABNORMAL /LPF
N GONORRHOEA DNA SPEC QL PROBE+SIG AMP: NEGATIVE
NATRIURETIC PEPTIDE B (PG/ML) IN SER/PLAS: 66 PG/ML (ref 0–99)
NEUTROPHILS # BLD AUTO: 1.53 X10*3/UL (ref 1.2–7.7)
NEUTROPHILS # BLD AUTO: 2.02 X10*3/UL (ref 1.2–7.7)
NEUTROPHILS # BLD AUTO: 2.59 X10*3/UL (ref 1.2–7.7)
NEUTROPHILS (10*3/UL) IN BLOOD BY AUTOMATED COUNT: 1.2 X10E9/L (ref 1.2–7.7)
NEUTROPHILS (10*3/UL) IN BLOOD BY AUTOMATED COUNT: 1.74 X10E9/L (ref 1.2–7.7)
NEUTROPHILS (10*3/UL) IN BLOOD BY AUTOMATED COUNT: 1.79 X10E9/L (ref 1.2–7.7)
NEUTROPHILS (10*3/UL) IN BLOOD BY AUTOMATED COUNT: 1.97 X10E9/L (ref 1.2–7.7)
NEUTROPHILS (10*3/UL) IN BLOOD BY AUTOMATED COUNT: 2.23 X10E9/L (ref 1.2–7.7)
NEUTROPHILS (10*3/UL) IN BLOOD BY AUTOMATED COUNT: 2.56 X10E9/L (ref 1.2–7.7)
NEUTROPHILS (10*3/UL) IN BLOOD BY AUTOMATED COUNT: 3.32 X10E9/L (ref 1.2–7.7)
NEUTROPHILS NFR BLD AUTO: 45.7 %
NEUTROPHILS NFR BLD AUTO: 56.2 %
NEUTROPHILS NFR BLD AUTO: 65.2 %
NEUTROPHILS/100 LEUKOCYTES IN BLOOD BY AUTOMATED COUNT: 35 % (ref 40–80)
NEUTROPHILS/100 LEUKOCYTES IN BLOOD BY AUTOMATED COUNT: 47.2 % (ref 40–80)
NEUTROPHILS/100 LEUKOCYTES IN BLOOD BY AUTOMATED COUNT: 51.6 % (ref 40–80)
NEUTROPHILS/100 LEUKOCYTES IN BLOOD BY AUTOMATED COUNT: 51.7 % (ref 40–80)
NEUTROPHILS/100 LEUKOCYTES IN BLOOD BY AUTOMATED COUNT: 51.9 % (ref 40–80)
NEUTROPHILS/100 LEUKOCYTES IN BLOOD BY AUTOMATED COUNT: 52.9 % (ref 40–80)
NEUTROPHILS/100 LEUKOCYTES IN BLOOD BY AUTOMATED COUNT: 77.8 % (ref 40–80)
NITRITE UR QL STRIP.AUTO: NEGATIVE
NRBC (PER 100 WBCS) BY AUTOMATED COUNT: 0 /100 WBC (ref 0–0)
NRBC BLD-RTO: 0 /100 WBCS (ref 0–0)
P AXIS: 47 DEGREES
P OFFSET: 211 MS
P ONSET: 156 MS
PATH REPORT.FINAL DX SPEC: NORMAL
PATH REPORT.GROSS SPEC: NORMAL
PATH REPORT.RELEVANT HX SPEC: NORMAL
PATH REPORT.RELEVANT HX SPEC: NORMAL
PATH REPORT.TOTAL CANCER: NORMAL
PATH REPORT.TOTAL CANCER: NORMAL
PH UR STRIP.AUTO: 5 [PH]
PHOSPHATE (MG/DL) IN SER/PLAS: 3.9 MG/DL (ref 2.5–4.9)
PLATELET # BLD AUTO: 265 X10*3/UL (ref 150–450)
PLATELET # BLD AUTO: 268 X10*3/UL (ref 150–450)
PLATELET # BLD AUTO: 282 X10*3/UL (ref 150–450)
PLATELETS (10*3/UL) IN BLOOD AUTOMATED COUNT: 252 X10E9/L (ref 150–450)
PLATELETS (10*3/UL) IN BLOOD AUTOMATED COUNT: 253 X10E9/L (ref 150–450)
PLATELETS (10*3/UL) IN BLOOD AUTOMATED COUNT: 266 X10E9/L (ref 150–450)
PLATELETS (10*3/UL) IN BLOOD AUTOMATED COUNT: 270 X10E9/L (ref 150–450)
PLATELETS (10*3/UL) IN BLOOD AUTOMATED COUNT: 284 X10E9/L (ref 150–450)
PLATELETS (10*3/UL) IN BLOOD AUTOMATED COUNT: 294 X10E9/L (ref 150–450)
PLATELETS (10*3/UL) IN BLOOD AUTOMATED COUNT: 296 X10E9/L (ref 150–450)
PMV BLD AUTO: 10.1 FL (ref 7.5–11.5)
POTASSIUM (MMOL/L) IN SER/PLAS: 3.8 MMOL/L (ref 3.5–5.3)
POTASSIUM (MMOL/L) IN SER/PLAS: 3.8 MMOL/L (ref 3.5–5.3)
POTASSIUM (MMOL/L) IN SER/PLAS: 3.9 MMOL/L (ref 3.5–5.3)
POTASSIUM (MMOL/L) IN SER/PLAS: 4.1 MMOL/L (ref 3.5–5.3)
POTASSIUM (MMOL/L) IN SER/PLAS: 4.2 MMOL/L (ref 3.5–5.3)
POTASSIUM (MMOL/L) IN SER/PLAS: 4.2 MMOL/L (ref 3.5–5.3)
POTASSIUM (MMOL/L) IN SER/PLAS: 4.3 MMOL/L (ref 3.5–5.3)
POTASSIUM (MMOL/L) IN SER/PLAS: 4.3 MMOL/L (ref 3.5–5.3)
POTASSIUM (MMOL/L) IN SER/PLAS: 4.5 MMOL/L (ref 3.5–5.3)
POTASSIUM (MMOL/L) IN SER/PLAS: 4.5 MMOL/L (ref 3.5–5.3)
POTASSIUM SERPL-SCNC: 3.8 MMOL/L (ref 3.5–5.3)
POTASSIUM SERPL-SCNC: 4 MMOL/L (ref 3.5–5.3)
POTASSIUM SERPL-SCNC: 4.3 MMOL/L (ref 3.5–5.3)
POTASSIUM SERPL-SCNC: 4.8 MMOL/L (ref 3.5–5.3)
PR INTERVAL: 132 MS
PROT SERPL-MCNC: 6.2 G/DL (ref 6.4–8.2)
PROT SERPL-MCNC: 7 G/DL (ref 6.4–8.2)
PROT SERPL-MCNC: 7.9 G/DL (ref 6.4–8.2)
PROT SERPL-MCNC: 8.1 G/DL (ref 6.4–8.2)
PROT UR STRIP.AUTO-MCNC: NEGATIVE MG/DL
PROT UR-ACNC: 20 MG/DL (ref 5–24)
PROT/CREAT UR: 0.1 MG/MG CREAT (ref 0–0.17)
PROTEIN (MG/DL) IN URINE: 16 MG/DL (ref 5–24)
PROTEIN TOTAL: 6.9 G/DL (ref 6.4–8.2)
PROTEIN TOTAL: 7 G/DL (ref 6.4–8.2)
PROTEIN TOTAL: 7.1 G/DL (ref 6.4–8.2)
PROTEIN TOTAL: 7.1 G/DL (ref 6.4–8.2)
PROTEIN TOTAL: 7.4 G/DL (ref 6.4–8.2)
PROTEIN TOTAL: 7.5 G/DL (ref 6.4–8.2)
PROTEIN TOTAL: 7.8 G/DL (ref 6.4–8.2)
PROTEIN TOTAL: 7.9 G/DL (ref 6.4–8.2)
PROTEIN/CREATININE (MG/MG) IN URINE: 0.08 MG/MG CREAT (ref 0–0.17)
Q ONSET: 222 MS
QRS COUNT: 13 BEATS
QRS DURATION: 86 MS
QT INTERVAL: 396 MS
QTC CALCULATION(BAZETT): 456 MS
QTC FREDERICIA: 436 MS
R AXIS: 52 DEGREES
RBC # BLD AUTO: 3.99 X10*6/UL (ref 4–5.2)
RBC # BLD AUTO: 4.12 X10*6/UL (ref 4–5.2)
RBC # BLD AUTO: 4.69 X10*6/UL (ref 4–5.2)
RBC # UR STRIP.AUTO: NEGATIVE /UL
RBC #/AREA URNS AUTO: ABNORMAL /HPF
RIGHT VENTRICLE FREE WALL PEAK S': 7.51
RIGHT VENTRICLE PEAK SYSTOLIC PRESSURE: 20.8
SIROLIMUS (NG/ML) IN BLOOD: 12.3 NG/ML (ref 4–20)
SIROLIMUS (NG/ML) IN BLOOD: 12.4 NG/ML (ref 4–20)
SIROLIMUS (NG/ML) IN BLOOD: 14.8 NG/ML (ref 4–20)
SIROLIMUS (NG/ML) IN BLOOD: 17.6 NG/ML (ref 4–20)
SIROLIMUS (NG/ML) IN BLOOD: 4.5 NG/ML (ref 4–20)
SIROLIMUS (NG/ML) IN BLOOD: 6.2 NG/ML (ref 4–20)
SIROLIMUS (NG/ML) IN BLOOD: 6.8 NG/ML (ref 4–20)
SIROLIMUS (NG/ML) IN BLOOD: 6.8 NG/ML (ref 4–20)
SIROLIMUS (NG/ML) IN BLOOD: 8.9 NG/ML (ref 4–20)
SIROLIMUS (NG/ML) IN BLOOD: 9.1 NG/ML (ref 4–20)
SIROLIMUS (NG/ML) IN BLOOD: 9.5 NG/ML (ref 4–20)
SIROLIMUS (NG/ML) IN BLOOD: <2 NG/ML (ref 4–20)
SIROLIMUS BLD-MCNC: 11.9 NG/ML (ref 4–20)
SIROLIMUS BLD-MCNC: 4.4 NG/ML (ref 4–20)
SIROLIMUS BLD-MCNC: 6.4 NG/ML (ref 4–20)
SIROLIMUS BLD-MCNC: 9.7 NG/ML (ref 4–20)
SODIUM (MMOL/L) IN SER/PLAS: 136 MMOL/L (ref 136–145)
SODIUM (MMOL/L) IN SER/PLAS: 136 MMOL/L (ref 136–145)
SODIUM (MMOL/L) IN SER/PLAS: 137 MMOL/L (ref 136–145)
SODIUM (MMOL/L) IN SER/PLAS: 137 MMOL/L (ref 136–145)
SODIUM (MMOL/L) IN SER/PLAS: 138 MMOL/L (ref 136–145)
SODIUM (MMOL/L) IN SER/PLAS: 139 MMOL/L (ref 136–145)
SODIUM (MMOL/L) IN SER/PLAS: 139 MMOL/L (ref 136–145)
SODIUM (MMOL/L) IN SER/PLAS: 140 MMOL/L (ref 136–145)
SODIUM (MMOL/L) IN SER/PLAS: 140 MMOL/L (ref 136–145)
SODIUM (MMOL/L) IN SER/PLAS: 142 MMOL/L (ref 136–145)
SODIUM SERPL-SCNC: 137 MMOL/L (ref 136–145)
SODIUM SERPL-SCNC: 138 MMOL/L (ref 136–145)
SODIUM SERPL-SCNC: 138 MMOL/L (ref 136–145)
SODIUM SERPL-SCNC: 143 MMOL/L (ref 136–145)
SP GR UR STRIP.AUTO: 1.02
SQUAMOUS #/AREA URNS AUTO: ABNORMAL /HPF
T AXIS: 73 DEGREES
T OFFSET: 420 MS
T VAGINALIS RRNA SPEC QL NAA+PROBE: NEGATIVE
TACROLIMUS (NG/ML) IN BLOOD: 11.5 NG/ML (ref 2–15)
TACROLIMUS (NG/ML) IN BLOOD: 4.4 NG/ML (ref 2–15)
TACROLIMUS (NG/ML) IN BLOOD: 4.4 NG/ML (ref 2–15)
TACROLIMUS (NG/ML) IN BLOOD: 4.6 NG/ML (ref 2–15)
TACROLIMUS (NG/ML) IN BLOOD: 4.7 NG/ML (ref 2–15)
TACROLIMUS (NG/ML) IN BLOOD: 5.3 NG/ML (ref 2–15)
TACROLIMUS (NG/ML) IN BLOOD: 5.4 NG/ML (ref 2–15)
TACROLIMUS (NG/ML) IN BLOOD: 5.5 NG/ML (ref 2–15)
TACROLIMUS (NG/ML) IN BLOOD: 5.9 NG/ML (ref 2–15)
TACROLIMUS (NG/ML) IN BLOOD: 7.1 NG/ML (ref 2–15)
TACROLIMUS (NG/ML) IN BLOOD: <2 NG/ML (ref 2–15)
TACROLIMUS (NG/ML) IN BLOOD: <2 NG/ML (ref 2–15)
TACROLIMUS BLD-MCNC: 2.5 NG/ML
TACROLIMUS BLD-MCNC: 3.9 NG/ML
TACROLIMUS BLD-MCNC: 6.2 NG/ML
TACROLIMUS BLD-MCNC: 7.2 NG/ML
THYROTROPIN (MIU/L) IN SER/PLAS BY DETECTION LIMIT <= 0.05 MIU/L: 21.69 MIU/L (ref 0.44–3.98)
THYROXINE (T4) FREE (NG/DL) IN SER/PLAS: 1.05 NG/DL (ref 0.78–1.48)
TRICUSPID ANNULAR PLANE SYSTOLIC EXCURSION: 1.1
TRIGLYCERIDE (MG/DL) IN SER/PLAS: 158 MG/DL (ref 0–149)
UREA NITROGEN (MG/DL) IN SER/PLAS: 12 MG/DL (ref 6–23)
UREA NITROGEN (MG/DL) IN SER/PLAS: 13 MG/DL (ref 6–23)
UREA NITROGEN (MG/DL) IN SER/PLAS: 15 MG/DL (ref 6–23)
UREA NITROGEN (MG/DL) IN SER/PLAS: 16 MG/DL (ref 6–23)
UREA NITROGEN (MG/DL) IN SER/PLAS: 16 MG/DL (ref 6–23)
UREA NITROGEN (MG/DL) IN SER/PLAS: 17 MG/DL (ref 6–23)
UREA NITROGEN (MG/DL) IN SER/PLAS: 17 MG/DL (ref 6–23)
UREA NITROGEN (MG/DL) IN SER/PLAS: 18 MG/DL (ref 6–23)
UREA NITROGEN (MG/DL) IN SER/PLAS: 19 MG/DL (ref 6–23)
UREA NITROGEN (MG/DL) IN SER/PLAS: 19 MG/DL (ref 6–23)
UREA NITROGEN (MG/DL) IN SER/PLAS: 20 MG/DL (ref 6–23)
UREA NITROGEN (MG/DL) IN SER/PLAS: 20 MG/DL (ref 6–23)
UROBILINOGEN UR STRIP.AUTO-MCNC: 4 MG/DL
VENTRICULAR RATE: 80 BPM
VLDL: 32 MG/DL (ref 0–40)
WBC # BLD AUTO: 3.1 X10*3/UL (ref 4.4–11.3)
WBC # BLD AUTO: 3.4 X10*3/UL (ref 4.4–11.3)
WBC # BLD AUTO: 4.6 X10*3/UL (ref 4.4–11.3)
WBC #/AREA URNS AUTO: >50 /HPF

## 2023-01-01 PROCEDURE — 80197 ASSAY OF TACROLIMUS: CPT

## 2023-01-01 PROCEDURE — 93306 TTE W/DOPPLER COMPLETE: CPT | Performed by: INTERNAL MEDICINE

## 2023-01-01 PROCEDURE — 88307 TISSUE EXAM BY PATHOLOGIST: CPT | Performed by: PATHOLOGY

## 2023-01-01 PROCEDURE — 80053 COMPREHEN METABOLIC PANEL: CPT

## 2023-01-01 PROCEDURE — 83735 ASSAY OF MAGNESIUM: CPT

## 2023-01-01 PROCEDURE — 88142 CYTOPATH C/V THIN LAYER: CPT

## 2023-01-01 PROCEDURE — 1036F TOBACCO NON-USER: CPT | Performed by: INTERNAL MEDICINE

## 2023-01-01 PROCEDURE — 85025 COMPLETE CBC W/AUTO DIFF WBC: CPT

## 2023-01-01 PROCEDURE — 2720000007 HC OR 272 NO HCPCS: Performed by: INTERNAL MEDICINE

## 2023-01-01 PROCEDURE — 87661 TRICHOMONAS VAGINALIS AMPLIF: CPT

## 2023-01-01 PROCEDURE — 85025 COMPLETE CBC W/AUTO DIFF WBC: CPT | Performed by: INTERNAL MEDICINE

## 2023-01-01 PROCEDURE — 3074F SYST BP LT 130 MM HG: CPT | Performed by: INTERNAL MEDICINE

## 2023-01-01 PROCEDURE — 2500000005 HC RX 250 GENERAL PHARMACY W/O HCPCS: Performed by: INTERNAL MEDICINE

## 2023-01-01 PROCEDURE — 7100000011 HC EXTENDED STAY RECOVERY HOURLY - NURSING UNIT

## 2023-01-01 PROCEDURE — 80195 ASSAY OF SIROLIMUS: CPT | Performed by: INTERNAL MEDICINE

## 2023-01-01 PROCEDURE — 99214 OFFICE O/P EST MOD 30 MIN: CPT | Performed by: INTERNAL MEDICINE

## 2023-01-01 PROCEDURE — 93505 ENDOMYOCARDIAL BIOPSY: CPT | Performed by: INTERNAL MEDICINE

## 2023-01-01 PROCEDURE — 83735 ASSAY OF MAGNESIUM: CPT | Performed by: INTERNAL MEDICINE

## 2023-01-01 PROCEDURE — 88350 IMFLUOR EA ADDL 1ANTB STN PX: CPT | Performed by: PATHOLOGY

## 2023-01-01 PROCEDURE — 99215 OFFICE O/P EST HI 40 MIN: CPT

## 2023-01-01 PROCEDURE — 3074F SYST BP LT 130 MM HG: CPT | Performed by: HOSPITALIST

## 2023-01-01 PROCEDURE — 2500000004 HC RX 250 GENERAL PHARMACY W/ HCPCS (ALT 636 FOR OP/ED): Performed by: INTERNAL MEDICINE

## 2023-01-01 PROCEDURE — 99214 OFFICE O/P EST MOD 30 MIN: CPT | Mod: 25 | Performed by: INTERNAL MEDICINE

## 2023-01-01 PROCEDURE — 36415 COLL VENOUS BLD VENIPUNCTURE: CPT

## 2023-01-01 PROCEDURE — 80197 ASSAY OF TACROLIMUS: CPT | Performed by: INTERNAL MEDICINE

## 2023-01-01 PROCEDURE — 87624 HPV HI-RISK TYP POOLED RSLT: CPT

## 2023-01-01 PROCEDURE — 99152 MOD SED SAME PHYS/QHP 5/>YRS: CPT | Performed by: INTERNAL MEDICINE

## 2023-01-01 PROCEDURE — RXMED WILLOW AMBULATORY MEDICATION CHARGE

## 2023-01-01 PROCEDURE — 93306 TTE W/DOPPLER COMPLETE: CPT

## 2023-01-01 PROCEDURE — 90686 IIV4 VACC NO PRSV 0.5 ML IM: CPT | Performed by: STUDENT IN AN ORGANIZED HEALTH CARE EDUCATION/TRAINING PROGRAM

## 2023-01-01 PROCEDURE — 1036F TOBACCO NON-USER: CPT | Performed by: HOSPITALIST

## 2023-01-01 PROCEDURE — 99153 MOD SED SAME PHYS/QHP EA: CPT | Performed by: INTERNAL MEDICINE

## 2023-01-01 PROCEDURE — 3079F DIAST BP 80-89 MM HG: CPT | Performed by: INTERNAL MEDICINE

## 2023-01-01 PROCEDURE — 1036F TOBACCO NON-USER: CPT | Performed by: STUDENT IN AN ORGANIZED HEALTH CARE EDUCATION/TRAINING PROGRAM

## 2023-01-01 PROCEDURE — 80195 ASSAY OF SIROLIMUS: CPT

## 2023-01-01 PROCEDURE — G0008 ADMIN INFLUENZA VIRUS VAC: HCPCS | Performed by: STUDENT IN AN ORGANIZED HEALTH CARE EDUCATION/TRAINING PROGRAM

## 2023-01-01 PROCEDURE — 82570 ASSAY OF URINE CREATININE: CPT | Performed by: HOSPITALIST

## 2023-01-01 PROCEDURE — 3078F DIAST BP <80 MM HG: CPT | Performed by: HOSPITALIST

## 2023-01-01 PROCEDURE — 80053 COMPREHEN METABOLIC PANEL: CPT | Performed by: INTERNAL MEDICINE

## 2023-01-01 PROCEDURE — 88350 IMFLUOR EA ADDL 1ANTB STN PX: CPT | Mod: TC,SUR | Performed by: INTERNAL MEDICINE

## 2023-01-01 PROCEDURE — 99213 OFFICE O/P EST LOW 20 MIN: CPT | Performed by: STUDENT IN AN ORGANIZED HEALTH CARE EDUCATION/TRAINING PROGRAM

## 2023-01-01 PROCEDURE — 2500000004 HC RX 250 GENERAL PHARMACY W/ HCPCS (ALT 636 FOR OP/ED): Performed by: STUDENT IN AN ORGANIZED HEALTH CARE EDUCATION/TRAINING PROGRAM

## 2023-01-01 PROCEDURE — C1751 CATH, INF, PER/CENT/MIDLINE: HCPCS | Performed by: INTERNAL MEDICINE

## 2023-01-01 PROCEDURE — 87800 DETECT AGNT MULT DNA DIREC: CPT

## 2023-01-01 PROCEDURE — 88346 IMFLUOR 1ST 1ANTB STAIN PX: CPT | Performed by: PATHOLOGY

## 2023-01-01 PROCEDURE — 81001 URINALYSIS AUTO W/SCOPE: CPT | Performed by: HOSPITALIST

## 2023-01-01 PROCEDURE — 3079F DIAST BP 80-89 MM HG: CPT | Performed by: STUDENT IN AN ORGANIZED HEALTH CARE EDUCATION/TRAINING PROGRAM

## 2023-01-01 PROCEDURE — C1894 INTRO/SHEATH, NON-LASER: HCPCS | Performed by: INTERNAL MEDICINE

## 2023-01-01 PROCEDURE — 36415 COLL VENOUS BLD VENIPUNCTURE: CPT | Performed by: INTERNAL MEDICINE

## 2023-01-01 PROCEDURE — 87086 URINE CULTURE/COLONY COUNT: CPT | Performed by: HOSPITALIST

## 2023-01-01 PROCEDURE — 3074F SYST BP LT 130 MM HG: CPT | Performed by: STUDENT IN AN ORGANIZED HEALTH CARE EDUCATION/TRAINING PROGRAM

## 2023-01-01 RX ORDER — SIROLIMUS 0.5 MG/1
1 TABLET, FILM COATED ORAL DAILY
Qty: 90 TABLET | Refills: 3 | Status: SHIPPED | OUTPATIENT
Start: 2023-01-01 | End: 2024-01-01 | Stop reason: SDUPTHER

## 2023-01-01 RX ORDER — TACROLIMUS 1 MG/1
CAPSULE ORAL
Qty: 60 CAPSULE | Refills: 3 | Status: SHIPPED | OUTPATIENT
Start: 2023-01-01 | End: 2023-01-01 | Stop reason: SDUPTHER

## 2023-01-01 RX ORDER — BLOOD-GLUCOSE METER
EACH MISCELLANEOUS 4 TIMES DAILY
COMMUNITY
Start: 2021-02-19

## 2023-01-01 RX ORDER — PRAVASTATIN SODIUM 40 MG/1
40 TABLET ORAL NIGHTLY
COMMUNITY
Start: 2022-04-05 | End: 2023-01-01 | Stop reason: ALTCHOICE

## 2023-01-01 RX ORDER — MYCOPHENOLATE MOFETIL 250 MG/1
250 CAPSULE ORAL 2 TIMES DAILY
COMMUNITY
Start: 2022-06-14 | End: 2023-01-01 | Stop reason: ALTCHOICE

## 2023-01-01 RX ORDER — TACROLIMUS 0.5 MG/1
0.5 CAPSULE ORAL EVERY MORNING
Qty: 60 CAPSULE | Refills: 3 | Status: CANCELLED | COMMUNITY
Start: 2023-01-01

## 2023-01-01 RX ORDER — TACROLIMUS 0.5 MG/1
0.5 CAPSULE ORAL 2 TIMES DAILY
Qty: 60 CAPSULE | Refills: 11 | Status: SHIPPED | OUTPATIENT
Start: 2023-01-01 | End: 2024-01-01 | Stop reason: DRUGHIGH

## 2023-01-01 RX ORDER — LEVOTHYROXINE SODIUM 150 UG/1
TABLET ORAL
COMMUNITY
Start: 2022-04-05 | End: 2023-01-01 | Stop reason: DRUGHIGH

## 2023-01-01 RX ORDER — VORICONAZOLE 200 MG/1
200 TABLET, FILM COATED ORAL 2 TIMES DAILY
Status: ON HOLD | COMMUNITY
Start: 2022-04-15 | End: 2023-01-01 | Stop reason: WASHOUT

## 2023-01-01 RX ORDER — DICLOFENAC SODIUM 10 MG/G
2 GEL TOPICAL 4 TIMES DAILY
Status: ON HOLD | COMMUNITY
Start: 2022-05-12 | End: 2023-01-01 | Stop reason: WASHOUT

## 2023-01-01 RX ORDER — LACTULOSE 10 G/15ML
30 SOLUTION ORAL; RECTAL AS NEEDED
Status: ON HOLD | COMMUNITY
Start: 2022-04-11 | End: 2023-01-01 | Stop reason: WASHOUT

## 2023-01-01 RX ORDER — VALGANCICLOVIR 450 MG/1
450 TABLET, FILM COATED ORAL DAILY
Status: ON HOLD | COMMUNITY
Start: 2022-05-18 | End: 2023-01-01 | Stop reason: WASHOUT

## 2023-01-01 RX ORDER — CIPROFLOXACIN 250 MG/1
250 TABLET, FILM COATED ORAL 2 TIMES DAILY
Qty: 14 TABLET | Refills: 0 | Status: SHIPPED | OUTPATIENT
Start: 2023-01-01 | End: 2023-01-01

## 2023-01-01 RX ORDER — MULTIVITAMIN
1 TABLET ORAL DAILY
COMMUNITY
Start: 2022-04-15

## 2023-01-01 RX ORDER — ERGOCALCIFEROL 1.25 MG/1
50000 CAPSULE ORAL
Status: ON HOLD | COMMUNITY
Start: 2018-01-17 | End: 2023-01-01 | Stop reason: WASHOUT

## 2023-01-01 RX ORDER — FENTANYL CITRATE 50 UG/ML
INJECTION, SOLUTION INTRAMUSCULAR; INTRAVENOUS AS NEEDED
Status: DISCONTINUED | OUTPATIENT
Start: 2023-01-01 | End: 2023-01-01 | Stop reason: HOSPADM

## 2023-01-01 RX ORDER — TACROLIMUS 0.5 MG/1
0.5 CAPSULE ORAL EVERY EVENING
Qty: 60 CAPSULE | Refills: 3 | Status: SHIPPED | OUTPATIENT
Start: 2023-01-01 | End: 2023-01-01 | Stop reason: SDUPTHER

## 2023-01-01 RX ORDER — NYSTATIN 100000 [USP'U]/ML
5 SUSPENSION ORAL 4 TIMES DAILY
Status: ON HOLD | COMMUNITY
Start: 2022-09-14 | End: 2023-01-01 | Stop reason: WASHOUT

## 2023-01-01 RX ORDER — TACROLIMUS 1 MG/1
CAPSULE ORAL
Qty: 60 CAPSULE | Refills: 3 | Status: SHIPPED | OUTPATIENT
Start: 2023-01-01 | End: 2023-01-01 | Stop reason: DRUGHIGH

## 2023-01-01 RX ORDER — LIDOCAINE HYDROCHLORIDE 10 MG/ML
INJECTION, SOLUTION EPIDURAL; INFILTRATION; INTRACAUDAL; PERINEURAL AS NEEDED
Status: DISCONTINUED | OUTPATIENT
Start: 2023-01-01 | End: 2023-01-01 | Stop reason: HOSPADM

## 2023-01-01 RX ORDER — NYSTATIN 100000 U/G
CREAM TOPICAL 2 TIMES DAILY
Qty: 15 G | Refills: 1 | Status: SHIPPED | OUTPATIENT
Start: 2023-01-01 | End: 2024-01-01 | Stop reason: SDUPTHER

## 2023-01-01 RX ORDER — FLUCONAZOLE 150 MG/1
150 TABLET ORAL ONCE
Qty: 1 TABLET | Refills: 0 | Status: SHIPPED | OUTPATIENT
Start: 2023-01-01 | End: 2023-01-01

## 2023-01-01 RX ORDER — CHLORHEXIDINE GLUCONATE ORAL RINSE 1.2 MG/ML
SOLUTION DENTAL EVERY 12 HOURS
Status: ON HOLD | COMMUNITY
Start: 2022-07-06 | End: 2023-01-01 | Stop reason: WASHOUT

## 2023-01-01 RX ORDER — FLUCONAZOLE 200 MG/1
200 TABLET ORAL EVERY 24 HOURS
Status: ON HOLD | COMMUNITY
Start: 2022-11-15 | End: 2023-01-01 | Stop reason: WASHOUT

## 2023-01-01 RX ORDER — DOCUSATE SODIUM 100 MG/1
100 CAPSULE, LIQUID FILLED ORAL 2 TIMES DAILY PRN
COMMUNITY
Start: 2022-04-05

## 2023-01-01 RX ORDER — OXYCODONE HYDROCHLORIDE 5 MG/1
5 TABLET ORAL EVERY 12 HOURS
Status: ON HOLD | COMMUNITY
Start: 2022-04-05 | End: 2023-01-01 | Stop reason: WASHOUT

## 2023-01-01 RX ORDER — TRAMADOL HYDROCHLORIDE 50 MG/1
1 TABLET ORAL EVERY 6 HOURS PRN
Status: ON HOLD | COMMUNITY
Start: 2022-05-24 | End: 2023-01-01 | Stop reason: WASHOUT

## 2023-01-01 RX ORDER — AZATHIOPRINE 50 MG/1
1.5 TABLET ORAL DAILY
COMMUNITY
Start: 2022-08-31 | End: 2023-01-01 | Stop reason: ALTCHOICE

## 2023-01-01 RX ORDER — MIDAZOLAM HYDROCHLORIDE 1 MG/ML
INJECTION, SOLUTION INTRAMUSCULAR; INTRAVENOUS AS NEEDED
Status: DISCONTINUED | OUTPATIENT
Start: 2023-01-01 | End: 2023-01-01 | Stop reason: HOSPADM

## 2023-01-01 RX ORDER — DAPSONE 100 MG/1
100 TABLET ORAL DAILY
Status: ON HOLD | COMMUNITY
Start: 2022-11-15 | End: 2023-01-01 | Stop reason: WASHOUT

## 2023-01-01 RX ORDER — SIROLIMUS 0.5 MG/1
1 TABLET, FILM COATED ORAL DAILY
Qty: 90 TABLET | Refills: 3 | COMMUNITY
Start: 2023-01-01 | End: 2023-01-01 | Stop reason: DRUGHIGH

## 2023-01-01 RX ORDER — UBIQUINOL 100 MG
CAPSULE ORAL
COMMUNITY
Start: 2023-04-10

## 2023-01-01 RX ORDER — POLYETHYLENE GLYCOL 3350 17 G/17G
17 POWDER, FOR SOLUTION ORAL DAILY PRN
Status: ON HOLD | COMMUNITY
Start: 2022-04-05 | End: 2023-01-01 | Stop reason: WASHOUT

## 2023-01-01 RX ORDER — TRAMADOL HYDROCHLORIDE 50 MG/1
50 TABLET ORAL EVERY 6 HOURS PRN
Qty: 15 TABLET | Refills: 0 | Status: SHIPPED | OUTPATIENT
Start: 2023-01-01 | End: 2023-01-01

## 2023-01-01 RX ORDER — SIROLIMUS 0.5 MG/1
1 TABLET, FILM COATED ORAL DAILY
Qty: 90 TABLET | Refills: 3 | COMMUNITY
Start: 2023-01-01 | End: 2023-01-01 | Stop reason: SDUPTHER

## 2023-01-01 RX ORDER — TACROLIMUS 1 MG/1
1 CAPSULE ORAL 2 TIMES DAILY
Qty: 60 CAPSULE | Refills: 3 | Status: SHIPPED | OUTPATIENT
Start: 2023-01-01 | End: 2024-01-01

## 2023-01-01 RX ORDER — ACETAMINOPHEN 325 MG/1
650 TABLET ORAL EVERY 6 HOURS PRN
Status: ON HOLD | COMMUNITY
Start: 2022-04-05 | End: 2023-01-01 | Stop reason: WASHOUT

## 2023-01-01 RX ORDER — EZETIMIBE 10 MG/1
TABLET ORAL
Status: ON HOLD | COMMUNITY
Start: 2023-03-20 | End: 2023-01-01 | Stop reason: WASHOUT

## 2023-01-01 RX ORDER — TACROLIMUS 1 MG/1
1 CAPSULE ORAL EVERY 12 HOURS
Qty: 60 CAPSULE | Refills: 3 | Status: CANCELLED | COMMUNITY
Start: 2023-01-01

## 2023-01-01 RX ADMIN — PERFLUTREN 1.5 ML OF DILUTION: 6.52 INJECTION, SUSPENSION INTRAVENOUS at 12:53

## 2023-01-01 ASSESSMENT — ENCOUNTER SYMPTOMS
HEMATOLOGIC/LYMPHATIC NEGATIVE: 1
POLYPHAGIA: 0
HEMATURIA: 0
RESPIRATORY NEGATIVE: 1
CARDIOVASCULAR NEGATIVE: 1
CHEST TIGHTNESS: 0
CONSTIPATION: 0
DYSURIA: 0
OCCASIONAL FEELINGS OF UNSTEADINESS: 1
LOSS OF SENSATION IN FEET: 1
BLOOD IN STOOL: 0
DEPRESSION: 1
PSYCHIATRIC NEGATIVE: 1
NEUROLOGICAL NEGATIVE: 1
ABDOMINAL DISTENTION: 0
FLANK PAIN: 0
COUGH: 0
NEW SYMPTOMS OF CORONARY ARTERY DISEASE: 0
SHORTNESS OF BREATH: 0
POLYDIPSIA: 0

## 2023-01-01 ASSESSMENT — PAIN SCALES - GENERAL
PAINLEVEL: 8
PAINLEVEL: 9
PAINLEVEL: 0-NO PAIN
PAINLEVEL: 6

## 2023-01-01 ASSESSMENT — PATIENT HEALTH QUESTIONNAIRE - PHQ9
SUM OF ALL RESPONSES TO PHQ9 QUESTIONS 1 AND 2: 4
2. FEELING DOWN, DEPRESSED OR HOPELESS: MORE THAN HALF THE DAYS
1. LITTLE INTEREST OR PLEASURE IN DOING THINGS: MORE THAN HALF THE DAYS

## 2023-02-21 LAB
BASOPHILS (10*3/UL) IN BLOOD BY AUTOMATED COUNT: 0.03 X10E9/L (ref 0–0.1)
BASOPHILS/100 LEUKOCYTES IN BLOOD BY AUTOMATED COUNT: 0.8 % (ref 0–2)
EOSINOPHILS (10*3/UL) IN BLOOD BY AUTOMATED COUNT: 0.13 X10E9/L (ref 0–0.7)
EOSINOPHILS/100 LEUKOCYTES IN BLOOD BY AUTOMATED COUNT: 3.3 % (ref 0–6)
ERYTHROCYTE DISTRIBUTION WIDTH (RATIO) BY AUTOMATED COUNT: 14.6 % (ref 11.5–14.5)
ERYTHROCYTE MEAN CORPUSCULAR HEMOGLOBIN CONCENTRATION (G/DL) BY AUTOMATED: 29.4 G/DL (ref 32–36)
ERYTHROCYTE MEAN CORPUSCULAR VOLUME (FL) BY AUTOMATED COUNT: 94 FL (ref 80–100)
ERYTHROCYTES (10*6/UL) IN BLOOD BY AUTOMATED COUNT: 3.62 X10E12/L (ref 4–5.2)
HEMATOCRIT (%) IN BLOOD BY AUTOMATED COUNT: 34 % (ref 36–46)
HEMOGLOBIN (G/DL) IN BLOOD: 10 G/DL (ref 12–16)
IMMATURE GRANULOCYTES/100 LEUKOCYTES IN BLOOD BY AUTOMATED COUNT: 0.3 % (ref 0–0.9)
LEUKOCYTES (10*3/UL) IN BLOOD BY AUTOMATED COUNT: 3.9 X10E9/L (ref 4.4–11.3)
LYMPHOCYTES (10*3/UL) IN BLOOD BY AUTOMATED COUNT: 1.87 X10E9/L (ref 1.2–4.8)
LYMPHOCYTES/100 LEUKOCYTES IN BLOOD BY AUTOMATED COUNT: 48.1 % (ref 13–44)
MONOCYTES (10*3/UL) IN BLOOD BY AUTOMATED COUNT: 0.54 X10E9/L (ref 0.1–1)
MONOCYTES/100 LEUKOCYTES IN BLOOD BY AUTOMATED COUNT: 13.9 % (ref 2–10)
NEUTROPHILS (10*3/UL) IN BLOOD BY AUTOMATED COUNT: 1.31 X10E9/L (ref 1.2–7.7)
NEUTROPHILS/100 LEUKOCYTES IN BLOOD BY AUTOMATED COUNT: 33.6 % (ref 40–80)
NRBC (PER 100 WBCS) BY AUTOMATED COUNT: 0 /100 WBC (ref 0–0)
PLATELETS (10*3/UL) IN BLOOD AUTOMATED COUNT: 346 X10E9/L (ref 150–450)

## 2023-02-23 LAB
ALANINE AMINOTRANSFERASE (SGPT) (U/L) IN SER/PLAS: 8 U/L (ref 7–45)
ALBUMIN (G/DL) IN SER/PLAS: 4.3 G/DL (ref 3.4–5)
ALKALINE PHOSPHATASE (U/L) IN SER/PLAS: 78 U/L (ref 33–110)
ANION GAP IN SER/PLAS: 13 MMOL/L (ref 10–20)
ASPARTATE AMINOTRANSFERASE (SGOT) (U/L) IN SER/PLAS: 16 U/L (ref 9–39)
BILIRUBIN TOTAL (MG/DL) IN SER/PLAS: 0.2 MG/DL (ref 0–1.2)
CALCIUM (MG/DL) IN SER/PLAS: 9.3 MG/DL (ref 8.6–10.6)
CARBON DIOXIDE, TOTAL (MMOL/L) IN SER/PLAS: 26 MMOL/L (ref 21–32)
CHLORIDE (MMOL/L) IN SER/PLAS: 105 MMOL/L (ref 98–107)
CREATININE (MG/DL) IN SER/PLAS: 1.2 MG/DL (ref 0.5–1.05)
GFR FEMALE: 62 ML/MIN/1.73M2
GLUCOSE (MG/DL) IN SER/PLAS: 81 MG/DL (ref 74–99)
MAGNESIUM (MG/DL) IN SER/PLAS: 1.82 MG/DL (ref 1.6–2.4)
POTASSIUM (MMOL/L) IN SER/PLAS: 4.2 MMOL/L (ref 3.5–5.3)
PROTEIN TOTAL: 7.1 G/DL (ref 6.4–8.2)
SODIUM (MMOL/L) IN SER/PLAS: 140 MMOL/L (ref 136–145)
TACROLIMUS (NG/ML) IN BLOOD: <2 NG/ML (ref 2–15)
UREA NITROGEN (MG/DL) IN SER/PLAS: 18 MG/DL (ref 6–23)

## 2023-02-24 LAB — SIROLIMUS (NG/ML) IN BLOOD: 8.5 NG/ML (ref 4–20)

## 2023-03-03 LAB
ALANINE AMINOTRANSFERASE (SGPT) (U/L) IN SER/PLAS: 7 U/L (ref 7–45)
ALBUMIN (G/DL) IN SER/PLAS: 4.1 G/DL (ref 3.4–5)
ALKALINE PHOSPHATASE (U/L) IN SER/PLAS: 55 U/L (ref 33–110)
ANION GAP IN SER/PLAS: 12 MMOL/L (ref 10–20)
ASPARTATE AMINOTRANSFERASE (SGOT) (U/L) IN SER/PLAS: 12 U/L (ref 9–39)
BASOPHILS (10*3/UL) IN BLOOD BY AUTOMATED COUNT: 0.03 X10E9/L (ref 0–0.1)
BASOPHILS/100 LEUKOCYTES IN BLOOD BY AUTOMATED COUNT: 1 % (ref 0–2)
BILIRUBIN TOTAL (MG/DL) IN SER/PLAS: 0.3 MG/DL (ref 0–1.2)
CALCIUM (MG/DL) IN SER/PLAS: 9.4 MG/DL (ref 8.6–10.6)
CARBON DIOXIDE, TOTAL (MMOL/L) IN SER/PLAS: 28 MMOL/L (ref 21–32)
CHLORIDE (MMOL/L) IN SER/PLAS: 104 MMOL/L (ref 98–107)
CREATININE (MG/DL) IN SER/PLAS: 1.2 MG/DL (ref 0.5–1.05)
EOSINOPHILS (10*3/UL) IN BLOOD BY AUTOMATED COUNT: 0.06 X10E9/L (ref 0–0.7)
EOSINOPHILS/100 LEUKOCYTES IN BLOOD BY AUTOMATED COUNT: 2 % (ref 0–6)
ERYTHROCYTE DISTRIBUTION WIDTH (RATIO) BY AUTOMATED COUNT: 14.5 % (ref 11.5–14.5)
ERYTHROCYTE MEAN CORPUSCULAR HEMOGLOBIN CONCENTRATION (G/DL) BY AUTOMATED: 29.8 G/DL (ref 32–36)
ERYTHROCYTE MEAN CORPUSCULAR VOLUME (FL) BY AUTOMATED COUNT: 89 FL (ref 80–100)
ERYTHROCYTES (10*6/UL) IN BLOOD BY AUTOMATED COUNT: 3.84 X10E12/L (ref 4–5.2)
GFR FEMALE: 62 ML/MIN/1.73M2
GLUCOSE (MG/DL) IN SER/PLAS: 91 MG/DL (ref 74–99)
HEMATOCRIT (%) IN BLOOD BY AUTOMATED COUNT: 34.2 % (ref 36–46)
HEMOGLOBIN (G/DL) IN BLOOD: 10.2 G/DL (ref 12–16)
IMMATURE GRANULOCYTES/100 LEUKOCYTES IN BLOOD BY AUTOMATED COUNT: 0.7 % (ref 0–0.9)
LEUKOCYTES (10*3/UL) IN BLOOD BY AUTOMATED COUNT: 3 X10E9/L (ref 4.4–11.3)
LYMPHOCYTES (10*3/UL) IN BLOOD BY AUTOMATED COUNT: 1.14 X10E9/L (ref 1.2–4.8)
LYMPHOCYTES/100 LEUKOCYTES IN BLOOD BY AUTOMATED COUNT: 38 % (ref 13–44)
MAGNESIUM (MG/DL) IN SER/PLAS: 1.49 MG/DL (ref 1.6–2.4)
MONOCYTES (10*3/UL) IN BLOOD BY AUTOMATED COUNT: 0.38 X10E9/L (ref 0.1–1)
MONOCYTES/100 LEUKOCYTES IN BLOOD BY AUTOMATED COUNT: 12.7 % (ref 2–10)
NEUTROPHILS (10*3/UL) IN BLOOD BY AUTOMATED COUNT: 1.37 X10E9/L (ref 1.2–7.7)
NEUTROPHILS/100 LEUKOCYTES IN BLOOD BY AUTOMATED COUNT: 45.6 % (ref 40–80)
NRBC (PER 100 WBCS) BY AUTOMATED COUNT: 0 /100 WBC (ref 0–0)
PLATELETS (10*3/UL) IN BLOOD AUTOMATED COUNT: 216 X10E9/L (ref 150–450)
POTASSIUM (MMOL/L) IN SER/PLAS: 4.1 MMOL/L (ref 3.5–5.3)
PROTEIN TOTAL: 7.1 G/DL (ref 6.4–8.2)
SODIUM (MMOL/L) IN SER/PLAS: 140 MMOL/L (ref 136–145)
TACROLIMUS (NG/ML) IN BLOOD: 3.8 NG/ML (ref 2–15)
UREA NITROGEN (MG/DL) IN SER/PLAS: 18 MG/DL (ref 6–23)

## 2023-03-04 LAB — SIROLIMUS (NG/ML) IN BLOOD: 9.7 NG/ML (ref 4–20)

## 2023-03-10 ENCOUNTER — APPOINTMENT (OUTPATIENT)
Dept: LAB | Facility: LAB | Age: 33
End: 2023-03-10

## 2023-03-10 LAB
ALANINE AMINOTRANSFERASE (SGPT) (U/L) IN SER/PLAS: 6 U/L (ref 7–45)
ALBUMIN (G/DL) IN SER/PLAS: 4.3 G/DL (ref 3.4–5)
ALKALINE PHOSPHATASE (U/L) IN SER/PLAS: 49 U/L (ref 33–110)
ANION GAP IN SER/PLAS: 13 MMOL/L (ref 10–20)
ASPARTATE AMINOTRANSFERASE (SGOT) (U/L) IN SER/PLAS: 10 U/L (ref 9–39)
BILIRUBIN TOTAL (MG/DL) IN SER/PLAS: 0.2 MG/DL (ref 0–1.2)
CALCIUM (MG/DL) IN SER/PLAS: 9.6 MG/DL (ref 8.6–10.6)
CARBON DIOXIDE, TOTAL (MMOL/L) IN SER/PLAS: 25 MMOL/L (ref 21–32)
CHLORIDE (MMOL/L) IN SER/PLAS: 103 MMOL/L (ref 98–107)
CREATININE (MG/DL) IN SER/PLAS: 1.11 MG/DL (ref 0.5–1.05)
GFR FEMALE: 68 ML/MIN/1.73M2
GLUCOSE (MG/DL) IN SER/PLAS: 147 MG/DL (ref 74–99)
IGG (MG/DL) IN SER/PLAS: 1530 MG/DL (ref 700–1600)
MAGNESIUM (MG/DL) IN SER/PLAS: 1.66 MG/DL (ref 1.6–2.4)
POTASSIUM (MMOL/L) IN SER/PLAS: 4.2 MMOL/L (ref 3.5–5.3)
PROTEIN TOTAL: 7.1 G/DL (ref 6.4–8.2)
SODIUM (MMOL/L) IN SER/PLAS: 137 MMOL/L (ref 136–145)
TACROLIMUS (NG/ML) IN BLOOD: 3.8 NG/ML (ref 2–15)
UREA NITROGEN (MG/DL) IN SER/PLAS: 21 MG/DL (ref 6–23)

## 2023-03-11 LAB — SIROLIMUS (NG/ML) IN BLOOD: 14.5 NG/ML (ref 4–20)

## 2023-03-17 LAB
ALANINE AMINOTRANSFERASE (SGPT) (U/L) IN SER/PLAS: 8 U/L (ref 7–45)
ALBUMIN (G/DL) IN SER/PLAS: 3.8 G/DL (ref 3.4–5)
ALKALINE PHOSPHATASE (U/L) IN SER/PLAS: 54 U/L (ref 33–110)
ANION GAP IN SER/PLAS: 15 MMOL/L (ref 10–20)
ASPARTATE AMINOTRANSFERASE (SGOT) (U/L) IN SER/PLAS: 17 U/L (ref 9–39)
BASOPHILS (10*3/UL) IN BLOOD BY AUTOMATED COUNT: 0.04 X10E9/L (ref 0–0.1)
BASOPHILS/100 LEUKOCYTES IN BLOOD BY AUTOMATED COUNT: 0.4 % (ref 0–2)
BILIRUBIN TOTAL (MG/DL) IN SER/PLAS: 0.4 MG/DL (ref 0–1.2)
CALCIUM (MG/DL) IN SER/PLAS: 9.1 MG/DL (ref 8.6–10.6)
CARBON DIOXIDE, TOTAL (MMOL/L) IN SER/PLAS: 25 MMOL/L (ref 21–32)
CHLORIDE (MMOL/L) IN SER/PLAS: 98 MMOL/L (ref 98–107)
CHOLESTEROL (MG/DL) IN SER/PLAS: 211 MG/DL (ref 0–199)
CHOLESTEROL IN HDL (MG/DL) IN SER/PLAS: 62.9 MG/DL
CHOLESTEROL/HDL RATIO: 3.4
CORTISOL (UG/DL) IN SERUM - AM: 24.7 UG/DL (ref 5–20)
CREATININE (MG/DL) IN SER/PLAS: 1.23 MG/DL (ref 0.5–1.05)
DEHYDROEPIANDROSTERONE SULFATE (DHEA-S) (UG/DL) IN SER/: 10 UG/DL (ref 12–379)
EOSINOPHILS (10*3/UL) IN BLOOD BY AUTOMATED COUNT: 0.01 X10E9/L (ref 0–0.7)
EOSINOPHILS/100 LEUKOCYTES IN BLOOD BY AUTOMATED COUNT: 0.1 % (ref 0–6)
ERYTHROCYTE DISTRIBUTION WIDTH (RATIO) BY AUTOMATED COUNT: 14.4 % (ref 11.5–14.5)
ERYTHROCYTE MEAN CORPUSCULAR HEMOGLOBIN CONCENTRATION (G/DL) BY AUTOMATED: 30.4 G/DL (ref 32–36)
ERYTHROCYTE MEAN CORPUSCULAR VOLUME (FL) BY AUTOMATED COUNT: 85 FL (ref 80–100)
ERYTHROCYTES (10*6/UL) IN BLOOD BY AUTOMATED COUNT: 4.04 X10E12/L (ref 4–5.2)
ESTIMATED AVERAGE GLUCOSE FOR HBA1C: 117 MG/DL
GFR FEMALE: 60 ML/MIN/1.73M2
GLUCOSE (MG/DL) IN SER/PLAS: 141 MG/DL (ref 74–99)
HEMATOCRIT (%) IN BLOOD BY AUTOMATED COUNT: 34.5 % (ref 36–46)
HEMOGLOBIN (G/DL) IN BLOOD: 10.5 G/DL (ref 12–16)
HEMOGLOBIN A1C/HEMOGLOBIN TOTAL IN BLOOD: 5.7 %
IMMATURE GRANULOCYTES/100 LEUKOCYTES IN BLOOD BY AUTOMATED COUNT: 3.5 % (ref 0–0.9)
LDL: 109 MG/DL (ref 0–99)
LEUKOCYTES (10*3/UL) IN BLOOD BY AUTOMATED COUNT: 10.7 X10E9/L (ref 4.4–11.3)
LIPASE (U/L) IN SER/PLAS: 8 U/L (ref 9–82)
LYMPHOCYTES (10*3/UL) IN BLOOD BY AUTOMATED COUNT: 1.21 X10E9/L (ref 1.2–4.8)
LYMPHOCYTES/100 LEUKOCYTES IN BLOOD BY AUTOMATED COUNT: 11.3 % (ref 13–44)
MAGNESIUM (MG/DL) IN SER/PLAS: 1.42 MG/DL (ref 1.6–2.4)
MONOCYTES (10*3/UL) IN BLOOD BY AUTOMATED COUNT: 0.85 X10E9/L (ref 0.1–1)
MONOCYTES/100 LEUKOCYTES IN BLOOD BY AUTOMATED COUNT: 7.9 % (ref 2–10)
NEUTROPHILS (10*3/UL) IN BLOOD BY AUTOMATED COUNT: 8.24 X10E9/L (ref 1.2–7.7)
NEUTROPHILS/100 LEUKOCYTES IN BLOOD BY AUTOMATED COUNT: 76.8 % (ref 40–80)
NRBC (PER 100 WBCS) BY AUTOMATED COUNT: 0 /100 WBC (ref 0–0)
PLATELETS (10*3/UL) IN BLOOD AUTOMATED COUNT: 246 X10E9/L (ref 150–450)
POTASSIUM (MMOL/L) IN SER/PLAS: 4.1 MMOL/L (ref 3.5–5.3)
PROTEIN TOTAL: 7 G/DL (ref 6.4–8.2)
SODIUM (MMOL/L) IN SER/PLAS: 134 MMOL/L (ref 136–145)
TACROLIMUS (NG/ML) IN BLOOD: 5.5 NG/ML (ref 2–15)
THYROTROPIN (MIU/L) IN SER/PLAS BY DETECTION LIMIT <= 0.05 MIU/L: 6.13 MIU/L (ref 0.44–3.98)
THYROXINE (T4) FREE (NG/DL) IN SER/PLAS: 1.08 NG/DL (ref 0.78–1.48)
TRIGLYCERIDE (MG/DL) IN SER/PLAS: 198 MG/DL (ref 0–149)
TRIIODOTHYRONINE (T3) (NG/DL) IN SER/PLAS: 69 NG/DL (ref 60–200)
UREA NITROGEN (MG/DL) IN SER/PLAS: 13 MG/DL (ref 6–23)
VLDL: 40 MG/DL (ref 0–40)

## 2023-03-18 LAB — SIROLIMUS (NG/ML) IN BLOOD: 6.9 NG/ML (ref 4–20)

## 2023-03-20 LAB — ADRENOCORTICOTROPIC HORMONE: 221 PG/ML (ref 7.2–63.3)

## 2023-03-22 LAB
BASOPHILS (10*3/UL) IN BLOOD BY AUTOMATED COUNT: 0.05 X10E9/L (ref 0–0.1)
BASOPHILS/100 LEUKOCYTES IN BLOOD BY AUTOMATED COUNT: 1.1 % (ref 0–2)
EOSINOPHILS (10*3/UL) IN BLOOD BY AUTOMATED COUNT: 0.1 X10E9/L (ref 0–0.7)
EOSINOPHILS/100 LEUKOCYTES IN BLOOD BY AUTOMATED COUNT: 2.2 % (ref 0–6)
ERYTHROCYTE DISTRIBUTION WIDTH (RATIO) BY AUTOMATED COUNT: 14.4 % (ref 11.5–14.5)
ERYTHROCYTE MEAN CORPUSCULAR HEMOGLOBIN CONCENTRATION (G/DL) BY AUTOMATED: 29.6 G/DL (ref 32–36)
ERYTHROCYTE MEAN CORPUSCULAR VOLUME (FL) BY AUTOMATED COUNT: 87 FL (ref 80–100)
ERYTHROCYTES (10*6/UL) IN BLOOD BY AUTOMATED COUNT: 3.81 X10E12/L (ref 4–5.2)
HEMATOCRIT (%) IN BLOOD BY AUTOMATED COUNT: 33.1 % (ref 36–46)
HEMOGLOBIN (G/DL) IN BLOOD: 9.8 G/DL (ref 12–16)
IMMATURE GRANULOCYTES/100 LEUKOCYTES IN BLOOD BY AUTOMATED COUNT: 2 % (ref 0–0.9)
LEUKOCYTES (10*3/UL) IN BLOOD BY AUTOMATED COUNT: 4.6 X10E9/L (ref 4.4–11.3)
LYMPHOCYTES (10*3/UL) IN BLOOD BY AUTOMATED COUNT: 1.31 X10E9/L (ref 1.2–4.8)
LYMPHOCYTES/100 LEUKOCYTES IN BLOOD BY AUTOMATED COUNT: 28.5 % (ref 13–44)
MONOCYTES (10*3/UL) IN BLOOD BY AUTOMATED COUNT: 0.57 X10E9/L (ref 0.1–1)
MONOCYTES/100 LEUKOCYTES IN BLOOD BY AUTOMATED COUNT: 12.4 % (ref 2–10)
NEUTROPHILS (10*3/UL) IN BLOOD BY AUTOMATED COUNT: 2.47 X10E9/L (ref 1.2–7.7)
NEUTROPHILS/100 LEUKOCYTES IN BLOOD BY AUTOMATED COUNT: 53.8 % (ref 40–80)
NRBC (PER 100 WBCS) BY AUTOMATED COUNT: 0 /100 WBC (ref 0–0)
PLATELETS (10*3/UL) IN BLOOD AUTOMATED COUNT: 306 X10E9/L (ref 150–450)

## 2023-03-24 LAB
ALANINE AMINOTRANSFERASE (SGPT) (U/L) IN SER/PLAS: 6 U/L (ref 7–45)
ALBUMIN (G/DL) IN SER/PLAS: 3.6 G/DL (ref 3.4–5)
ALKALINE PHOSPHATASE (U/L) IN SER/PLAS: 51 U/L (ref 33–110)
ANION GAP IN SER/PLAS: 11 MMOL/L (ref 10–20)
ASPARTATE AMINOTRANSFERASE (SGOT) (U/L) IN SER/PLAS: 11 U/L (ref 9–39)
BASOPHILS (10*3/UL) IN BLOOD BY AUTOMATED COUNT: 0.04 X10E9/L (ref 0–0.1)
BASOPHILS/100 LEUKOCYTES IN BLOOD BY AUTOMATED COUNT: 0.9 % (ref 0–2)
BILIRUBIN TOTAL (MG/DL) IN SER/PLAS: 0.2 MG/DL (ref 0–1.2)
CALCIUM (MG/DL) IN SER/PLAS: 9.2 MG/DL (ref 8.6–10.6)
CARBON DIOXIDE, TOTAL (MMOL/L) IN SER/PLAS: 28 MMOL/L (ref 21–32)
CHLORIDE (MMOL/L) IN SER/PLAS: 103 MMOL/L (ref 98–107)
CREATININE (MG/DL) IN SER/PLAS: 1.19 MG/DL (ref 0.5–1.05)
EOSINOPHILS (10*3/UL) IN BLOOD BY AUTOMATED COUNT: 0.11 X10E9/L (ref 0–0.7)
EOSINOPHILS/100 LEUKOCYTES IN BLOOD BY AUTOMATED COUNT: 2.4 % (ref 0–6)
ERYTHROCYTE DISTRIBUTION WIDTH (RATIO) BY AUTOMATED COUNT: 14.5 % (ref 11.5–14.5)
ERYTHROCYTE MEAN CORPUSCULAR HEMOGLOBIN CONCENTRATION (G/DL) BY AUTOMATED: 29.7 G/DL (ref 32–36)
ERYTHROCYTE MEAN CORPUSCULAR VOLUME (FL) BY AUTOMATED COUNT: 86 FL (ref 80–100)
ERYTHROCYTES (10*6/UL) IN BLOOD BY AUTOMATED COUNT: 4.05 X10E12/L (ref 4–5.2)
GFR FEMALE: 62 ML/MIN/1.73M2
GLUCOSE (MG/DL) IN SER/PLAS: 90 MG/DL (ref 74–99)
HEMATOCRIT (%) IN BLOOD BY AUTOMATED COUNT: 34.7 % (ref 36–46)
HEMOGLOBIN (G/DL) IN BLOOD: 10.3 G/DL (ref 12–16)
IMMATURE GRANULOCYTES/100 LEUKOCYTES IN BLOOD BY AUTOMATED COUNT: 3.6 % (ref 0–0.9)
LEUKOCYTES (10*3/UL) IN BLOOD BY AUTOMATED COUNT: 4.5 X10E9/L (ref 4.4–11.3)
LYMPHOCYTES (10*3/UL) IN BLOOD BY AUTOMATED COUNT: 1.82 X10E9/L (ref 1.2–4.8)
LYMPHOCYTES/100 LEUKOCYTES IN BLOOD BY AUTOMATED COUNT: 40.5 % (ref 13–44)
MAGNESIUM (MG/DL) IN SER/PLAS: 1.77 MG/DL (ref 1.6–2.4)
MONOCYTES (10*3/UL) IN BLOOD BY AUTOMATED COUNT: 0.61 X10E9/L (ref 0.1–1)
MONOCYTES/100 LEUKOCYTES IN BLOOD BY AUTOMATED COUNT: 13.6 % (ref 2–10)
NEUTROPHILS (10*3/UL) IN BLOOD BY AUTOMATED COUNT: 1.75 X10E9/L (ref 1.2–7.7)
NEUTROPHILS/100 LEUKOCYTES IN BLOOD BY AUTOMATED COUNT: 39 % (ref 40–80)
NRBC (PER 100 WBCS) BY AUTOMATED COUNT: 0 /100 WBC (ref 0–0)
PLATELETS (10*3/UL) IN BLOOD AUTOMATED COUNT: 376 X10E9/L (ref 150–450)
POTASSIUM (MMOL/L) IN SER/PLAS: 4.1 MMOL/L (ref 3.5–5.3)
PROTEIN TOTAL: 6.8 G/DL (ref 6.4–8.2)
SODIUM (MMOL/L) IN SER/PLAS: 138 MMOL/L (ref 136–145)
TACROLIMUS (NG/ML) IN BLOOD: 3.8 NG/ML (ref 2–15)
UREA NITROGEN (MG/DL) IN SER/PLAS: 12 MG/DL (ref 6–23)

## 2023-03-25 LAB — SIROLIMUS (NG/ML) IN BLOOD: 14.7 NG/ML (ref 4–20)

## 2023-03-29 ENCOUNTER — HOSPITAL ENCOUNTER (OUTPATIENT)
Dept: DATA CONVERSION | Facility: HOSPITAL | Age: 33
End: 2023-03-29
Attending: INTERNAL MEDICINE | Admitting: INTERNAL MEDICINE
Payer: COMMERCIAL

## 2023-03-29 DIAGNOSIS — Z48.21 ENCOUNTER FOR AFTERCARE FOLLOWING HEART TRANSPLANT (MULTI): ICD-10-CM

## 2023-03-29 DIAGNOSIS — I10 ESSENTIAL (PRIMARY) HYPERTENSION: ICD-10-CM

## 2023-03-29 DIAGNOSIS — Z91.040 LATEX ALLERGY STATUS: ICD-10-CM

## 2023-03-29 DIAGNOSIS — T86.21 HEART TRANSPLANT REJECTION (MULTI): ICD-10-CM

## 2023-03-29 DIAGNOSIS — M32.9 SYSTEMIC LUPUS ERYTHEMATOSUS, UNSPECIFIED (MULTI): ICD-10-CM

## 2023-03-29 LAB
ALBUMIN (G/DL) IN SER/PLAS: 3.6 G/DL (ref 3.4–5)
ANION GAP IN SER/PLAS: 12 MMOL/L (ref 10–20)
CALCIUM (MG/DL) IN SER/PLAS: 8.9 MG/DL (ref 8.6–10.6)
CARBON DIOXIDE, TOTAL (MMOL/L) IN SER/PLAS: 25 MMOL/L (ref 21–32)
CHLORIDE (MMOL/L) IN SER/PLAS: 106 MMOL/L (ref 98–107)
CREATININE (MG/DL) IN SER/PLAS: 1.12 MG/DL (ref 0.5–1.05)
GFR FEMALE: 67 ML/MIN/1.73M2
GLUCOSE (MG/DL) IN SER/PLAS: 82 MG/DL (ref 74–99)
HEPATITIS B VIRUS CORE AB (PRESENCE) IN SER/PLAS BY IMM: NONREACTIVE
HEPATITIS B VIRUS SURFACE AB (MIU/ML) IN SERUM: >1000 MIU/ML
HEPATITIS B VIRUS SURFACE AG PRESENCE IN SERUM: NONREACTIVE
HEPATITIS C VIRUS AB PRESENCE IN SERUM: NONREACTIVE
NATRIURETIC PEPTIDE B (PG/ML) IN SER/PLAS: 60 PG/ML (ref 0–99)
PHOSPHATE (MG/DL) IN SER/PLAS: 4 MG/DL (ref 2.5–4.9)
POTASSIUM (MMOL/L) IN SER/PLAS: 4.3 MMOL/L (ref 3.5–5.3)
SODIUM (MMOL/L) IN SER/PLAS: 139 MMOL/L (ref 136–145)
UREA NITROGEN (MG/DL) IN SER/PLAS: 14 MG/DL (ref 6–23)

## 2023-03-30 LAB
COMPLETE PATHOLOGY REPORT: NORMAL
CONVERTED CLINICAL DIAGNOSIS-HISTORY: NORMAL
CONVERTED FINAL DIAGNOSIS: NORMAL
CONVERTED FINAL REPORT PDF LINK TO COPY AND PASTE: NORMAL
CONVERTED GROSS DESCRIPTION: NORMAL
HBV DNA PCR COMMENT: NORMAL
HBV DNA QUANT PCR IU/ML: NOT DETECTED IU/ML
HBV DNA QUANT PCR LOG: NORMAL LOG IU/ML

## 2023-04-04 ENCOUNTER — HOSPITAL ENCOUNTER (OUTPATIENT)
Dept: DATA CONVERSION | Facility: HOSPITAL | Age: 33
End: 2023-04-04
Attending: INTERNAL MEDICINE | Admitting: INTERNAL MEDICINE
Payer: COMMERCIAL

## 2023-04-04 DIAGNOSIS — Z48.21 ENCOUNTER FOR AFTERCARE FOLLOWING HEART TRANSPLANT (MULTI): ICD-10-CM

## 2023-04-04 LAB
ALANINE AMINOTRANSFERASE (SGPT) (U/L) IN SER/PLAS: 7 U/L (ref 7–45)
ALBUMIN (G/DL) IN SER/PLAS: 3.4 G/DL (ref 3.4–5)
ALKALINE PHOSPHATASE (U/L) IN SER/PLAS: 41 U/L (ref 33–110)
ANION GAP IN SER/PLAS: 17 MMOL/L (ref 10–20)
ASPARTATE AMINOTRANSFERASE (SGOT) (U/L) IN SER/PLAS: 13 U/L (ref 9–39)
BASOPHILS (10*3/UL) IN BLOOD BY AUTOMATED COUNT: 0.02 X10E9/L (ref 0–0.1)
BASOPHILS/100 LEUKOCYTES IN BLOOD BY AUTOMATED COUNT: 0.7 % (ref 0–2)
BILIRUBIN TOTAL (MG/DL) IN SER/PLAS: 0.2 MG/DL (ref 0–1.2)
CALCIUM (MG/DL) IN SER/PLAS: 7.9 MG/DL (ref 8.6–10.6)
CARBON DIOXIDE, TOTAL (MMOL/L) IN SER/PLAS: 21 MMOL/L (ref 21–32)
CHLORIDE (MMOL/L) IN SER/PLAS: 109 MMOL/L (ref 98–107)
CREATININE (MG/DL) IN SER/PLAS: 1.03 MG/DL (ref 0.5–1.05)
EOSINOPHILS (10*3/UL) IN BLOOD BY AUTOMATED COUNT: 0.06 X10E9/L (ref 0–0.7)
EOSINOPHILS/100 LEUKOCYTES IN BLOOD BY AUTOMATED COUNT: 2.2 % (ref 0–6)
ERYTHROCYTE DISTRIBUTION WIDTH (RATIO) BY AUTOMATED COUNT: 14.9 % (ref 11.5–14.5)
ERYTHROCYTE MEAN CORPUSCULAR HEMOGLOBIN CONCENTRATION (G/DL) BY AUTOMATED: 30.4 G/DL (ref 32–36)
ERYTHROCYTE MEAN CORPUSCULAR VOLUME (FL) BY AUTOMATED COUNT: 84 FL (ref 80–100)
ERYTHROCYTES (10*6/UL) IN BLOOD BY AUTOMATED COUNT: 3.61 X10E12/L (ref 4–5.2)
GFR FEMALE: 74 ML/MIN/1.73M2
GLUCOSE (MG/DL) IN SER/PLAS: 86 MG/DL (ref 74–99)
HEMATOCRIT (%) IN BLOOD BY AUTOMATED COUNT: 30.3 % (ref 36–46)
HEMOGLOBIN (G/DL) IN BLOOD: 9.2 G/DL (ref 12–16)
IMMATURE GRANULOCYTES/100 LEUKOCYTES IN BLOOD BY AUTOMATED COUNT: 0.4 % (ref 0–0.9)
LEUKOCYTES (10*3/UL) IN BLOOD BY AUTOMATED COUNT: 2.8 X10E9/L (ref 4.4–11.3)
LYMPHOCYTES (10*3/UL) IN BLOOD BY AUTOMATED COUNT: 1.21 X10E9/L (ref 1.2–4.8)
LYMPHOCYTES/100 LEUKOCYTES IN BLOOD BY AUTOMATED COUNT: 44 % (ref 13–44)
MAGNESIUM (MG/DL) IN SER/PLAS: 1.48 MG/DL (ref 1.6–2.4)
MONOCYTES (10*3/UL) IN BLOOD BY AUTOMATED COUNT: 0.34 X10E9/L (ref 0.1–1)
MONOCYTES/100 LEUKOCYTES IN BLOOD BY AUTOMATED COUNT: 12.4 % (ref 2–10)
NEUTROPHILS (10*3/UL) IN BLOOD BY AUTOMATED COUNT: 1.11 X10E9/L (ref 1.2–7.7)
NEUTROPHILS/100 LEUKOCYTES IN BLOOD BY AUTOMATED COUNT: 40.3 % (ref 40–80)
NRBC (PER 100 WBCS) BY AUTOMATED COUNT: 0 /100 WBC (ref 0–0)
PLATELETS (10*3/UL) IN BLOOD AUTOMATED COUNT: 252 X10E9/L (ref 150–450)
POTASSIUM (MMOL/L) IN SER/PLAS: 3.8 MMOL/L (ref 3.5–5.3)
PROTEIN TOTAL: 6.4 G/DL (ref 6.4–8.2)
SIROLIMUS (NG/ML) IN BLOOD: 4.1 NG/ML (ref 4–20)
SODIUM (MMOL/L) IN SER/PLAS: 143 MMOL/L (ref 136–145)
TACROLIMUS (NG/ML) IN BLOOD: 4.1 NG/ML (ref 2–15)
UREA NITROGEN (MG/DL) IN SER/PLAS: 14 MG/DL (ref 6–23)

## 2023-04-05 LAB — POC ACTIVATED CLOTTING TIME LOW RANGE: 304 SECONDS (ref 89–169)

## 2023-09-14 NOTE — H&P
History of Present Illness:   Pregnant/Lactating:  ·  Are You Pregnant no   ·  Are You Currently Breastfeeding no     History Present Illness:  Reason for surgery: RHC and biopsy post transplant   HPI:    Ms Yimi Bowles presents today for a routine 1 yr post heart transplant RHC and biopsy. She is s/p 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.    Plan:  Will proceed with RHC and biopsy as discussed with Dr Cornell.    Michael BIANCHI  Advanced Heart Failure and Transplant Cardiology Fellow          Allergies:        Allergies:  ·  Farxiga : Bleeding  ·  Latex : Rash       Intolerances:  ·  Jardiance : Fever    Home Medication Review:   Home Medications Reviewed: yes     Impression/Procedure:   ·  Impression and Planned Procedure: RHC and biopsy       ERAS (Enhanced Recovery After Surgery):  ·  ERAS Patient: no       Physical Exam by System:    Respiratory/Thorax: Patent airways, CTAB, normal  breath sounds with good chest expansion, thorax symmetric   Cardiovascular: Regular, rate and rhythm, no murmurs,  2+ equal pulses of the extremities, normal S 1and S 2     Airway/Sedation Assessment:  ·  Emotional Status calm   ·  Neurologic alert & oriented x 3   ·  Respiratory clear to auscultation   ·  Cardiovascular rhythm & rate regular   · Pulses present: Pedal Left, Pedal Right, Radial Left, Radial Right     ·  Mouth Opening OK yes   ·  Neck Flexibility OK yes   ·  Loose Teeth no   ·  Oropharyngeal Classification Class II   ·  ASA PS Classification ASA II   ·  Sedation Plan moderate sedation     Consent:   COVID-19 Consent:  ·  COVID-19 Risk Consent Surgeon has reviewed key risks related to the risk of krishan COVID-19 and if they contract COVID-19 what the risks are.     Attestation:   Note Completion:  I am a:  Resident/Fellow   Attending Attestation I saw and evaluated the patient.  I personally obtained the key and critical portions of the history and physical exam or was physically present for key and  critical portions  performed by the resident/fellow. I reviewed the resident/fellow?s documentation and discussed the patient with the resident/fellow.  I agree with the resident/fellow?s medical decision making as documented in the note.     I personally evaluated the patient on 29-Mar-2023         Electronic Signatures:  Michael Begum (Fellow))  (Signed 29-Mar-2023 07:53)   Authored: History of Present Illness, Allergies, Home  Medication Review, Impression/Procedure, ERAS, Physical Exam, Consent, Note Completion  Jeyson Cornell (DO)  (Signed 29-Mar-2023 21:57)   Authored: Note Completion   Co-Signer: History of Present Illness, Allergies, Home Medication Review, Impression/Procedure, ERAS, Physical Exam, Consent, Note Completion      Last Updated: 29-Mar-2023 21:57 by Jeyson Cornell (DO)

## 2023-09-14 NOTE — H&P
History & Physical Reviewed:   Pregnant/Lactating:  ·  Are You Pregnant no   ·  Are You Currently Breastfeeding no     I have reviewed the History and Physical dated:  29-Mar-2023   History and Physical reviewed and relevant findings noted. Patient examined to review pertinent physical  findings.: No significant changes   Home Medications Reviewed: no changes noted   Allergies Reviewed: no changes noted       Airway/Sedation Assessment:  ·  Emotional Status calm   ·  Neurologic alert & oriented x 3   ·  Mouth Opening OK yes   ·  Neck Flexibility OK yes   ·  Loose Teeth no   ·  Oropharyngeal Classification Class II   ·  ASA PS Classification ASA II   ·  Sedation Plan moderate sedation       ERAS (Enhanced Recovery After Surgery):  ·  ERAS Patient: no     Consent:   COVID-19 Consent:  ·  COVID-19 Risk Consent Surgeon has reviewed key risks related to the risk of krishan COVID-19 and if they contract COVID-19 what the risks are.     Attestation:   Note Completion:  I am a:  Resident/Fellow   Attending Attestation I saw and evaluated the patient.  I personally obtained the key and critical portions of the history and physical exam or was physically present for key and  critical portions performed by the resident/fellow. I reviewed the resident/fellow?s documentation and discussed the patient with the resident/fellow.  I agree with the resident/fellow?s medical decision making as documented in the note.     I personally evaluated the patient on 04-Apr-2023         Electronic Signatures:  Stephie Cat (Fellow))  (Signed 04-Apr-2023 07:11)   Authored: History & Physical Reviewed, Airway/Sedation,  ERAS, Consent, Note Completion  Cesar Aguilar)  (Signed 09-Apr-2023 14:38)   Authored: Note Completion   Co-Signer: History & Physical Reviewed, Airway/Sedation, ERAS, Consent, Note Completion      Last Updated: 09-Apr-2023 14:38 by Cesar Aguilar)

## 2023-09-30 NOTE — H&P
History of Present Illness:   Pregnant/Lactating:  ·  Are You Pregnant no   ·  Are You Currently Breastfeeding no     History Present Illness:  Reason for surgery: Surveillance right heart catheterization  with endomyocardial biopsy   HPI:    34 y/o Female with Stage D HFrEF/NICM, s/p ICD, obesity, s/p gastric bypass (2017), SLE,  s/p OHT (3/31/2022), AMR (Grade PAMR 1) in 7/2022, presenting for surveillance RHC with EMB.            Allergies:        Allergies:  ·  Farxiga : Bleeding  ·  Latex : Rash       Intolerances:  ·  Jardiance : Fever    Home Medication Review:   Home Medications Reviewed: yes     Impression/Procedure:   ·  Impression and Planned Procedure: Right heart catheterization with endomyocardial biopsy       ERAS (Enhanced Recovery After Surgery):  ·  ERAS Patient: no       Physical Exam by System:    Constitutional: Well developed, awake/alert/oriented  x3, no distress, alert and cooperative   Respiratory/Thorax: Patent airways, CTAB, normal  breath sounds with good chest expansion   Cardiovascular: Regular, rate and rhythm, no murmurs,  normal S 1and S 2     Consent:   COVID-19 Consent:  ·  COVID-19 Risk Consent Surgeon has reviewed key risks related to the risk of krishan COVID-19 and if they contract COVID-19 what the risks are.     Attestation:   Note Completion:  I am a:  Resident/Fellow   Attending Attestation I saw and evaluated the patient.  I personally obtained the key and critical portions of the history and physical exam or was physically present for key and  critical portions performed by the resident/fellow. I reviewed the resident/fellow?s documentation and discussed the patient with the resident/fellow.  I agree with the resident/fellow?s medical decision making as documented in the note.     I personally evaluated the patient on 27-Sep-2023         Electronic Signatures:  Nasir Piper (Fellow))  (Signed 27-Sep-2023 07:29)   Authored: History of Present Illness,  Allergies, Home  Medication Review, Impression/Procedure, ERAS, Physical Exam, Consent, Note Completion  Jeyson Cornell ()  (Signed 27-Sep-2023 09:13)   Authored: Note Completion   Co-Signer: History of Present Illness, Allergies, Home Medication Review, Impression/Procedure, ERAS, Physical Exam, Consent, Note Completion      Last Updated: 27-Sep-2023 09:13 by Jeyson Cornell ()

## 2023-09-30 NOTE — H&P
History of Present Illness:   Pregnant/Lactating:  ·  Are You Pregnant no   ·  Are You Currently Breastfeeding no     History Present Illness:  Reason for surgery: RHC and biopsy post transplant   HPI:    Ms Yimi Bowles presents for her routine RHC and biopsy post OHT (3/2022).    Plan:  Will proceed with RHC and biopsy as discussed with Dr Cornell.    Michael BIANCHI  Advanced Heart Failure and Transplant Cardiology Fellow      Allergies:        Allergies:  ·  Farxiga : Bleeding  ·  Latex : Rash       Intolerances:  ·  Jardiance : Fever    Home Medication Review:   Home Medications Reviewed: yes     Impression/Procedure:   ·  Impression and Planned Procedure: RHC and biopsy       ERAS (Enhanced Recovery After Surgery):  ·  ERAS Patient: no       Physical Exam by System:    Respiratory/Thorax: Patent airways, CTAB, normal  breath sounds with good chest expansion, thorax symmetric   Cardiovascular: Regular, rate and rhythm, no murmurs,  2+ equal pulses of the extremities, normal S 1and S 2     Airway/Sedation Assessment:  ·  Emotional Status calm   ·  Neurologic alert & oriented x 3   ·  Respiratory clear to auscultation   ·  Cardiovascular rhythm & rate regular   · Pulses present: Pedal Left, Pedal Right, Radial Left, Radial Right     ·  Mouth Opening OK yes   ·  Neck Flexibility OK yes   ·  Loose Teeth no   ·  Oropharyngeal Classification Class II   ·  ASA PS Classification ASA II   ·  Sedation Plan moderate sedation     Consent:   COVID-19 Consent:  ·  COVID-19 Risk Consent Surgeon has reviewed key risks related to the risk of krishan COVID-19 and if they contract COVID-19 what the risks are.     Attestation:   Note Completion:  I am a:  Resident/Fellow   Attending Attestation I saw and evaluated the patient.  I personally obtained the key and critical portions of the history and physical exam or was physically present for key and  critical portions performed by the resident/fellow. I reviewed the  resident/fellow?s documentation and discussed the patient with the resident/fellow.  I agree with the resident/fellow?s medical decision making as documented in the note.     I personally evaluated the patient on 14-Jun-2023         Electronic Signatures:  Michael Begum (Fellow))  (Signed 14-Jun-2023 07:42)   Authored: History of Present Illness, Allergies, Home  Medication Review, Impression/Procedure, ERAS, Physical Exam, Consent, Note Completion  Jeyson Cornell ()  (Signed 14-Jun-2023 09:23)   Authored: Note Completion   Co-Signer: History of Present Illness, Allergies, Home Medication Review, Impression/Procedure, ERAS, Physical Exam, Consent, Note Completion      Last Updated: 14-Jun-2023 09:23 by Jeyson Cornell ()

## 2023-10-11 NOTE — TELEPHONE ENCOUNTER
Received call from patient reporting that she is in severe full body pain and isn't sure if it's from lupus. She asked her rheumatologist for pain medication but they requested she speak to us for meds. Patient states her vital signs are normal, she just has pain from her neck down. Will notify Dr. Cornell.

## 2023-10-11 NOTE — TELEPHONE ENCOUNTER
Per Dr. Cornell, he will prescribe Tramadol for the patient. Medication was sent to [facility] on [facility] per patient's request. Patient is otherwise doing well and will get repeat labs tomorrow.

## 2023-10-16 NOTE — TELEPHONE ENCOUNTER
Patient's FK resulted at 7.2 and Siro at 11.9. Patient is supposed to be taking 0.5 mg Sirolimus daily, but states that she has been taking 1 mg daily. Information was relayed to Dr. Cornell, and will wait to see how he would like to adjust the dose. Will contact patient with any dose changes.

## 2023-10-17 NOTE — TELEPHONE ENCOUNTER
Attempted to call patient x2 re: medication changes. Patient did not answer and voicemail was left requesting call back.

## 2023-10-17 NOTE — TELEPHONE ENCOUNTER
Patient returned phone call and verbalized medication changes of 0.5 mg of Sirolimus and 1.5/1 mg of FK. Patient to repeat labs in one week.

## 2023-10-27 PROBLEM — Z94.1 HEART REPLACED BY TRANSPLANT (MULTI): Status: ACTIVE | Noted: 2023-01-01

## 2023-10-27 NOTE — TELEPHONE ENCOUNTER
Called to check in with patient re: labs. Patient states that she can get repeat labs on Tuesday, 10/31.     Patient also states that she has two appointments with two different nephrologists in November and was wondering why. Informed patient that I will reach out to the kidney team and have them contact her about this. Additionally, patient states that she is in need of a new endocrinologist and was wondering who we recommend, as Dr. Irizarry is no longer with . Patient also states that she needs some refills and is going to call [facility] about these, patient will call back if there are any issues with this. Finally, patient wants our team to reach out to Dr. Wayne re: rescheduling appointment sooner. Will work on all patient requests and get back to patient .

## 2023-10-31 NOTE — TELEPHONE ENCOUNTER
Returned call and patient states that she needs a CBC added on to her weekly labs, as the patient states that the  said they did not have one on file.     Patient also asked if we could call Dr. Holder about her lupus medication. Will reach out to Dr. Cornell about this.

## 2023-10-31 NOTE — TELEPHONE ENCOUNTER
Patient called back and stated that the lab called her again and stated that they can see the order, but it is not yet approved and they can't run the sample that they have. Informed patient that the order is pended to Dr. Cornell to authorize and I will continue to check and see when it is done. Patient verbalized understanding.

## 2023-10-31 NOTE — TELEPHONE ENCOUNTER
Pt called as she was in the lab getting her complete blood panel. Lab stated the orders were not found in Epic. Please place new orders.

## 2023-11-01 NOTE — TELEPHONE ENCOUNTER
Patient's FK resulted at 3.9 (goal 3-5) and Siro resulted at 6.4 (goal 7-9). Per Dr. Cornell, patient is to increase her Sirolimus to 1 mg daily. Called to update patient who verbalized understanding and will be labs again in 1 week.

## 2023-11-03 NOTE — TELEPHONE ENCOUNTER
[facility]: Outreach by Ambulatory Pharmacy Team    Payor: [employer] [place]  Reason: Adherence  Medication: rosuvastatin (Crestor)   Outcome: Patient Reached: Barriers Identified, the patient informed me that she has been unable to reach [facility] at  [facility] since they switched to a new system and she is due to refill all of her medicines. I contacted  [facility] and requested that they contact the patient today as soon as possible.     Chandni Villar, PharmD   Clinical Pharmacist, [facility]
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
## 2023-11-08 PROBLEM — Z99.2 ANEMIA DUE TO CHRONIC KIDNEY DISEASE, ON CHRONIC DIALYSIS (MULTI): Status: ACTIVE | Noted: 2023-01-01

## 2023-11-08 PROBLEM — E83.51 HYPOCALCEMIA: Status: ACTIVE | Noted: 2022-05-09

## 2023-11-08 PROBLEM — L93.0 LUPUS ERYTHEMATOSUS: Status: ACTIVE | Noted: 2023-01-01

## 2023-11-08 PROBLEM — T63.441A BEE STING: Status: ACTIVE | Noted: 2023-01-01

## 2023-11-08 PROBLEM — S39.012A LUMBAR STRAIN: Status: ACTIVE | Noted: 2023-01-01

## 2023-11-08 PROBLEM — D22.60 MELANOCYTIC NEVI OF UNSPECIFIED UPPER LIMB, INCLUDING SHOULDER: Status: ACTIVE | Noted: 2023-01-01

## 2023-11-08 PROBLEM — G25.81 RLS (RESTLESS LEGS SYNDROME): Status: ACTIVE | Noted: 2023-01-01

## 2023-11-08 PROBLEM — D70.9 NEUTROPENIA (CMS-HCC): Status: ACTIVE | Noted: 2023-01-01

## 2023-11-08 PROBLEM — N87.1 CIN II (CERVICAL INTRAEPITHELIAL NEOPLASIA II): Status: ACTIVE | Noted: 2023-01-01

## 2023-11-08 PROBLEM — R03.0 ELEVATED BLOOD-PRESSURE READING WITHOUT DIAGNOSIS OF HYPERTENSION: Status: ACTIVE | Noted: 2023-01-01

## 2023-11-08 PROBLEM — K12.1 STOMATITIS AND MUCOSITIS: Status: ACTIVE | Noted: 2023-01-01

## 2023-11-08 PROBLEM — L85.8 OTHER SPECIFIED EPIDERMAL THICKENING: Status: ACTIVE | Noted: 2023-01-01

## 2023-11-08 PROBLEM — Z90.710 H/O: HYSTERECTOMY: Status: ACTIVE | Noted: 2023-01-01

## 2023-11-08 PROBLEM — L91.0 KELOID: Status: ACTIVE | Noted: 2023-01-01

## 2023-11-08 PROBLEM — G47.19 EXCESSIVE DAYTIME SLEEPINESS: Status: ACTIVE | Noted: 2023-01-01

## 2023-11-08 PROBLEM — I82.409 DEEP VEIN THROMBOSIS (DVT) (MULTI): Status: ACTIVE | Noted: 2023-01-01

## 2023-11-08 PROBLEM — R04.0 EPISTAXIS: Status: ACTIVE | Noted: 2023-01-01

## 2023-11-08 PROBLEM — D22.39 MELANOCYTIC NEVI OF OTHER PARTS OF FACE: Status: ACTIVE | Noted: 2023-01-01

## 2023-11-08 PROBLEM — D63.1 ANEMIA DUE TO CHRONIC KIDNEY DISEASE, ON CHRONIC DIALYSIS (MULTI): Status: ACTIVE | Noted: 2023-01-01

## 2023-11-08 PROBLEM — E87.5 HYPERKALEMIA: Status: ACTIVE | Noted: 2023-01-01

## 2023-11-08 PROBLEM — R53.81 MALAISE AND FATIGUE: Status: ACTIVE | Noted: 2023-01-01

## 2023-11-08 PROBLEM — K21.9 GERD (GASTROESOPHAGEAL REFLUX DISEASE): Status: ACTIVE | Noted: 2023-01-01

## 2023-11-08 PROBLEM — T86.21: Status: ACTIVE | Noted: 2023-01-01

## 2023-11-08 PROBLEM — D22.4 MELANOCYTIC NEVI OF SCALP AND NECK: Status: ACTIVE | Noted: 2023-01-01

## 2023-11-08 PROBLEM — K91.2 POSTSURGICAL MALABSORPTION (HHS-HCC): Status: ACTIVE | Noted: 2023-01-01

## 2023-11-08 PROBLEM — R73.03 PREDIABETES: Status: ACTIVE | Noted: 2023-01-01

## 2023-11-08 PROBLEM — R94.6 ABNORMAL THYROID FUNCTION TEST: Status: ACTIVE | Noted: 2023-01-01

## 2023-11-08 PROBLEM — R06.83 SNORING: Status: ACTIVE | Noted: 2023-01-01

## 2023-11-08 PROBLEM — G51.4 EYELID MYOKYMIA: Status: ACTIVE | Noted: 2023-01-01

## 2023-11-08 PROBLEM — R06.02 SHORTNESS OF BREATH ON EXERTION: Status: ACTIVE | Noted: 2023-01-01

## 2023-11-08 PROBLEM — D22.70 MELANOCYTIC NEVI OF UNSPECIFIED LOWER LIMB, INCLUDING HIP: Status: ACTIVE | Noted: 2023-01-01

## 2023-11-08 PROBLEM — K59.00 CONSTIPATION: Status: ACTIVE | Noted: 2023-01-01

## 2023-11-08 PROBLEM — E87.70 HYPERVOLEMIA: Status: ACTIVE | Noted: 2023-01-01

## 2023-11-08 PROBLEM — F41.8 DEPRESSION WITH ANXIETY: Status: ACTIVE | Noted: 2023-01-01

## 2023-11-08 PROBLEM — F41.9 ANXIETY: Status: ACTIVE | Noted: 2023-01-01

## 2023-11-08 PROBLEM — E83.42 HYPOMAGNESEMIA: Status: ACTIVE | Noted: 2023-01-01

## 2023-11-08 PROBLEM — L57.9 SKIN CHANGES DUE TO CHRONIC EXPOSURE TO NONIONIZING RADIATION, UNSPECIFIED: Status: ACTIVE | Noted: 2023-01-01

## 2023-11-08 PROBLEM — Z95.810 CARDIAC DEFIBRILLATOR IN PLACE: Status: ACTIVE | Noted: 2023-01-01

## 2023-11-08 PROBLEM — H53.143 PHOTOPHOBIA OF BOTH EYES: Status: ACTIVE | Noted: 2023-01-01

## 2023-11-08 PROBLEM — S30.1XXA GROIN HEMATOMA: Status: ACTIVE | Noted: 2023-01-01

## 2023-11-08 PROBLEM — N93.9 VAGINAL BLEEDING, ABNORMAL: Status: ACTIVE | Noted: 2023-01-01

## 2023-11-08 PROBLEM — E16.2 HYPOGLYCEMIA: Status: ACTIVE | Noted: 2023-01-01

## 2023-11-08 PROBLEM — E03.9 ACQUIRED HYPOTHYROIDISM: Status: ACTIVE | Noted: 2023-01-01

## 2023-11-08 PROBLEM — H52.7 REFRACTIVE ERROR: Status: ACTIVE | Noted: 2023-01-01

## 2023-11-08 PROBLEM — Z98.84 S/P LAPAROSCOPIC SLEEVE GASTRECTOMY: Status: ACTIVE | Noted: 2023-01-01

## 2023-11-08 PROBLEM — R30.0 DYSURIA: Status: ACTIVE | Noted: 2023-01-01

## 2023-11-08 PROBLEM — G56.00 CARPAL TUNNEL SYNDROME: Status: ACTIVE | Noted: 2023-01-01

## 2023-11-08 PROBLEM — E87.8 ELECTROLYTE AND FLUID DISORDER: Status: ACTIVE | Noted: 2023-01-01

## 2023-11-08 PROBLEM — G47.00 INSOMNIA: Status: ACTIVE | Noted: 2023-01-01

## 2023-11-08 PROBLEM — I10 ESSENTIAL HYPERTENSION: Status: ACTIVE | Noted: 2022-01-31

## 2023-11-08 PROBLEM — J31.0 CHRONIC RHINITIS: Status: ACTIVE | Noted: 2023-01-01

## 2023-11-08 PROBLEM — H53.8 BLURRING OF VISUAL IMAGE: Status: ACTIVE | Noted: 2023-01-01

## 2023-11-08 PROBLEM — A59.9 TRICHIMONIASIS: Status: ACTIVE | Noted: 2023-01-01

## 2023-11-08 PROBLEM — R00.2 HEART PALPITATIONS: Status: ACTIVE | Noted: 2023-01-01

## 2023-11-08 PROBLEM — D18.01 HEMANGIOMA OF SKIN AND SUBCUTANEOUS TISSUE: Status: ACTIVE | Noted: 2023-01-01

## 2023-11-08 PROBLEM — K52.9 COLITIS: Status: ACTIVE | Noted: 2023-01-01

## 2023-11-08 PROBLEM — R79.89 ABNORMAL CORTISOL LEVEL: Status: ACTIVE | Noted: 2023-01-01

## 2023-11-08 PROBLEM — M62.838 MUSCLE SPASM: Status: ACTIVE | Noted: 2023-01-01

## 2023-11-08 PROBLEM — B07.9 VIRAL WART, UNSPECIFIED: Status: ACTIVE | Noted: 2023-01-01

## 2023-11-08 PROBLEM — D84.9 IMMUNOSUPPRESSION (MULTI): Status: ACTIVE | Noted: 2023-01-01

## 2023-11-08 PROBLEM — R53.83 MALAISE AND FATIGUE: Status: ACTIVE | Noted: 2023-01-01

## 2023-11-08 PROBLEM — E04.0 SIMPLE GOITER: Status: ACTIVE | Noted: 2023-01-01

## 2023-11-08 PROBLEM — K12.30 STOMATITIS AND MUCOSITIS: Status: ACTIVE | Noted: 2023-01-01

## 2023-11-08 PROBLEM — D22.5 MELANOCYTIC NEVI OF TRUNK: Status: ACTIVE | Noted: 2023-01-01

## 2023-11-08 PROBLEM — R49.0 DYSPHONIA: Status: ACTIVE | Noted: 2023-01-01

## 2023-11-08 PROBLEM — E04.0 GOITER DIFFUSE, NONTOXIC: Status: ACTIVE | Noted: 2023-01-01

## 2023-11-08 PROBLEM — I42.9 CARDIOMYOPATHY (MULTI): Status: ACTIVE | Noted: 2023-01-01

## 2023-11-08 PROBLEM — J38.2 VOCAL CORD NODULES: Status: ACTIVE | Noted: 2023-01-01

## 2023-11-08 PROBLEM — R82.90 ABNORMAL URINALYSIS: Status: ACTIVE | Noted: 2023-01-01

## 2023-11-08 PROBLEM — R51.9 HEADACHE: Status: ACTIVE | Noted: 2023-01-01

## 2023-11-08 PROBLEM — D72.819 LEUKOPENIA: Status: ACTIVE | Noted: 2023-01-01

## 2023-11-08 PROBLEM — R55 SYNCOPE AND COLLAPSE: Status: ACTIVE | Noted: 2021-10-10

## 2023-11-08 PROBLEM — N18.6 ANEMIA DUE TO CHRONIC KIDNEY DISEASE, ON CHRONIC DIALYSIS (MULTI): Status: ACTIVE | Noted: 2023-01-01

## 2023-11-08 PROBLEM — H57.13 EYE PAIN, BILATERAL: Status: ACTIVE | Noted: 2023-01-01

## 2023-11-08 PROBLEM — I50.22: Status: ACTIVE | Noted: 2023-01-01

## 2023-11-09 NOTE — TELEPHONE ENCOUNTER
Patient called from [facility] stating that they were unable to draw her labs as they cannot see the orders. Patient asked if the orders could be printed and walked to the lab. Lab orders were printed and walked over to the patient at [facility].

## 2023-11-09 NOTE — LETTER
November 13, 2023     Patient: Charla Bowles   YOB: 1990   Date of Visit: 11/9/2023       To Whom It May Concern:    Charla Bowles was seen in my clinic on 11/9/2023 at 9:00 am. Please excuse Charla for her absence from work on this day to make the appointment.    If you have any questions or concerns, please don't hesitate to call.         Sincerely,         Zayra Sharma MD        CC: No Recipients

## 2023-11-09 NOTE — LETTER
November 13, 2023     Patient: Charla Bowles   YOB: 1990   Date of Visit: 11/9/2023       To Whom It May Concern:    Charla Bowles was seen in my clinic on 11/9/2023 at 9:00 am. Please excuse Charla for her absence from school on this day to make the appointment.    If you have any questions or concerns, please don't hesitate to call.         Sincerely,         Zayra Sharma MD        CC:   No Recipients

## 2023-11-09 NOTE — PROGRESS NOTES
Ginettesylviajackelyn Bowles  is a 33 year old F with a PMHx sig for stage D HFrEF (PPCM) 2/2 NICM s/p ICD s/p CardioMEMs, hypothyroidism 2/2 thyroidectomy on replacement therapy, obesity s/p gastric bypass (2017), and SLE who is s/p OHT (3/31/2022) with a post-OHT course complicated by RIJ/RUE DVTs, leukopenia, and left groin seroma.  -Patient had asymptomatic 2 or ACR rejection as of #2022 treated with steroids and thrush.  She was transition from MMF to azathioprine due to colitis and because of recent rejection heart team.  Azathioprine and switch to sirolimus.    Subjective: Patient complaining of increased urination recently since switched to sirolimus.    Review of Systems   HENT: Negative.     Respiratory: Negative.  Negative for cough, chest tightness and shortness of breath.    Cardiovascular: Negative.  Negative for leg swelling.   Gastrointestinal:  Negative for abdominal distention, blood in stool and constipation.   Endocrine: Negative for polydipsia, polyphagia and polyuria.   Genitourinary:  Negative for decreased urine volume, dysuria, flank pain, hematuria and urgency.   Neurological: Negative.    Hematological: Negative.    Psychiatric/Behavioral: Negative.          Objective:  Visit Vitals  /70   Pulse 69   Temp 36.4 °C (97.6 °F) (Temporal)   Wt 91.3 kg (201 lb 3.2 oz)   SpO2 100%   BMI 38.02 kg/m²   BSA 1.98 m²       Physical examination:  -Alert oriented x3.  -Lungs are clear bilateral vesicular breath sounds.  -S/p heart transplant  -Abdomen soft nontender.  -No edema in the lower extremities.      Current Outpatient Medications:     acetaminophen (Tylenol) 325 mg tablet, Take 2 tablets (650 mg) by mouth every 6 hours if needed., Disp: , Rfl:     Alcohol Prep Pads pads, medicated, , Disp: , Rfl:     amLODIPine (Norvasc) 2.5 mg tablet, TAKE ONE (1) TABLET BY MOUTH ONCE DAILY, Disp: 30 tablet, Rfl: 11    aspirin 81 mg EC tablet, TAKE ONE (1) TABLET BY MOUTH ONCE A DAY, Disp: 30 tablet, Rfl: 10     blood sugar diagnostic (OneTouch Verio test strips) strip, 4 times a day., Disp: , Rfl:     calcitriol (Rocaltrol) 0.5 mcg capsule, TAKE ONE (1) CAPSULE BY MOUTH ONCE DAILY., Disp: 90 capsule, Rfl: 3    calcium carbonate (Oscal) 500 mg calcium (1,250 mg) tablet, TAKE ONE (1) TABLET BY MOUTH TWICE DAILY. TAKE AT LEAST 2 HOURS BEFORE OR 2 HOURS AFTER IMMUNOSUPPRESSION MEDICATIONS., Disp: 60 tablet, Rfl: 11    chlorhexidine (Peridex) 0.12 % solution, Take by mouth every 12 hours. RINSE MOUTH WITH 15ML (1 CAPFUL) FOR 30 SECONDS AM AND PM AFTER TOOTHBRUSHING. EXPECTORATE AFTER RINSING, DO NOT SWALLOW, Disp: , Rfl:     dapsone 100 mg tablet, Take 1 tablet (100 mg) by mouth once daily., Disp: , Rfl:     diclofenac sodium (Voltaren) 1 % gel gel, Apply 0.5 Applications topically 4 times a day., Disp: , Rfl:     docusate sodium (Colace) 100 mg capsule, Take 1 capsule (100 mg) by mouth 2 times a day as needed., Disp: , Rfl:     ergocalciferol (Vitamin D-2) 1.25 MG (50389 UT) capsule, Take 1 capsule (50,000 Units) by mouth 1 (one) time per week., Disp: , Rfl:     ezetimibe (Zetia) 10 mg tablet, , Disp: , Rfl:     ferrous sulfate 325 (65 Fe) MG tablet, TAKE ONE (1) TABLET BY MOUTH DAILY., Disp: 90 tablet, Rfl: 3    fluconazole (Diflucan) 200 mg tablet, Take 1 tablet (200 mg) by mouth once every 24 hours., Disp: , Rfl:     insulin lispro (HumaLOG) 100 unit/mL injection, USE FOR SLIDING SCALE INSULIN COVERAGE UP TO 4 TIMES DAILY AS DIRECTED. MAX OF 40 UNITS PER DAY., Disp: 15 mL, Rfl: 11    lactulose 20 gram/30 mL oral solution, Take 30 mL (20 g) by mouth if needed., Disp: , Rfl:     levothyroxine (Synthroid, Levoxyl) 200 mcg tablet, TAKE ONE (1) TABLET BY MOUTH ONCE DAILY. (Patient taking differently: Take 2 tablets (400 mcg) by mouth once daily.), Disp: 90 tablet, Rfl: 3    magnesium oxide (Mag-Ox) 400 mg (241.3 mg magnesium) tablet, TAKE TWO (2) TABLETS BY MOUTH DAILY, Disp: 60 tablet, Rfl: 11    multivitamin tablet, Take 1  tablet by mouth once daily., Disp: , Rfl:     nystatin (Mycostatin) 100,000 unit/mL suspension, Take 5 mL (500,000 Units) by mouth 4 times a day., Disp: , Rfl:     oxyCODONE (Roxicodone) 5 mg immediate release tablet, Take 1 tablet (5 mg) by mouth every 12 hours. PRN left groin pain, Disp: , Rfl:     pantoprazole (ProtoNix) 40 mg EC tablet, TAKE ONE (1) TABLET BY MOUTH DAILY., Disp: 30 tablet, Rfl: 11    polyethylene glycol (Glycolax, Miralax) 17 gram/dose powder, Take 17 g by mouth once daily as needed., Disp: , Rfl:     predniSONE (Deltasone) 5 mg tablet, TAKE ONE (1) TABLET BY MOUTH ONCE DAILY., Disp: 90 tablet, Rfl: 3    rosuvastatin (Crestor) 40 mg tablet, TAKE ONE (1) TABLET BY MOUTH DAILY., Disp: 90 tablet, Rfl: 3    sirolimus (Rapamune) 0.5 mg tablet, Take 2 tablets (1 mg) by mouth once daily., Disp: 90 tablet, Rfl: 3    tacrolimus (Prograf) 0.5 mg capsule, TAKE ONE (1) CAPSULE BY MOUTH EVERY 12 HOURS, Disp: 60 capsule, Rfl: 3    tacrolimus (Prograf) 1 mg capsule, TAKE ONE (1) CAPSULE BY MOUTH EVERY 12 HOURS, Disp: 60 capsule, Rfl: 3    traMADol (Ultram) 50 mg tablet, Take 1 tablet (50 mg) by mouth every 6 hours if needed for moderate pain (4 - 6)., Disp: , Rfl:     valGANciclovir (Valcyte) 450 mg tablet, Take 1 tablet (450 mg) by mouth once daily., Disp: , Rfl:     voriconazole (Vfend) 200 mg tablet, Take 1 tablet (200 mg) by mouth 2 times a day., Disp: , Rfl:      [unfilled]     No images are attached to the encounter.     Assessment and Plan :  Charla Bowles  is a 33 year old F with a PMHx sig for stage D HFrEF (PPCM) 2/2 NICM s/p ICD s/p CardioMEMs, hypothyroidism 2/2 thyroidectomy on replacement therapy, obesity s/p gastric bypass (2017), and SLE who is s/p OHT (3/31/2022) with a post-OHT course complicated by RIJ/RUE DVTs, leukopenia, and left groin seroma.  -Patient had asymptomatic 2 or ACR rejection as of #2022 treated with steroids and thrush.  She was transition from MMF to  azathioprine due to colitis and because of recent rejection heart team.  Azathioprine and switch to sirolimus.    CKD stage II: Seems to be stable recent creatinine is around 1.01 which is almost her baseline creatinine.  Her baseline used to be 0.8 -0.6 before heart transplant.  Will check UPC as the patient currently is on sirolimus.  Prior ultrasound showing mild fullness in the left renal pelvis otherwise been stable.  -Urinary frequency: We will check UA with culture as well as check UPC and urine osmolality and serum osmolality please    S/p orthotic heart transplant: Last echocardiogram from September 2023 showing EF around 65 to 70%, RVSP is within normal limits.  Mildly reduced right ventricular systolic function.  Immunosuppression as per the heart transplant team.    Hemodynamics: Blood pressures are optimally controlled further management by the heart transplant team.    No anemia mild leukopenia noticed.    Bone mineral disease: Recent DEXA scan is within normal range.  Calcium and phosphorus levels are optimal consider checking PTH and vitamin D with the next labs.    General health care: Age-appropriate screening COVID vaccination and flu vaccinations recommended.    Labs can be done with the heart transplant and follow-up in 3 months.    Edith Iverson MD

## 2023-11-10 NOTE — TELEPHONE ENCOUNTER
On urine results, it was noted that patient has a UTI. Per Dr. Cornell, patient is to start taking 250 mg Ciprfloxacin x7 days and we will follow up on her culture and adjust her antibiotics if needed. Attempted to call patient to let her know, but patient did not answer. Medication was pended to Dr. Cornell.

## 2023-11-10 NOTE — TELEPHONE ENCOUNTER
Patient's FK was 2.5 (goal 3-5) and Siro 4.4 (goal 7-9). Per Dr. Cornell, patient is to increase to 2 mg/1.5 mg BID of FK and 1.5 mg daily of Sirolimus and repeat labs in about 1-2 weeks. Called to update patient who verbalized understanding.     Patient also stated that her furnace is out and she has been unable to get her landlord to come and fix it. Asked patient if she needed anything from our team, and she states that she has already told them she has medical issues and needs heat. Patient will keep us updated as things progress.     Additionally, patient states that she has been urinating more lately and sometimes getting the urge to urinate, but does not have to. Patient saw transplant nephrology on 11/9 and urine tests are pending. Will update Dr. Cornell.

## 2023-11-27 NOTE — PROGRESS NOTES
Transplant Cardiologist: Aneta   Transplant Coordinator: Claudia De La O   Referring: n/a   Primary Care Physician: No primary care provider on file.      Date of Visit: 11/29/2023  2:40 PM EST  Location of visit: [facility]   Type of Visit: Routine post-OHT    Date of Transplant: 3/31/2022 (Heart)      HPI / Summary:   Ms. Yimi Bowles is a 33F with a PMHx sig for stage D HFrEF (PPCM) s/p ICD s/p CardioMEMs, hypothyroidism 2/2 thyroidectomy on replacement therapy, obesity s/p gastric bypass (2017), and SLE who is s/p OHT (3/31/2022) with a post-OHT course complicated by RIJ/RUE DVTs, leukopenia, left groin seroma, worsening renal function, asymptomatic 2R ACR (11/2022) treated with steroids, and thrush who presents to the Transplant clinic for her routine 21 month post-transplant visit which includes RHC w/ bx, ECHO, labs, and clinic.     Interval Hx:   Currently denies chest pain, pressure, SOB/DAVILA, PND, orthopnea, LE edema, palpitations, light headedness, dizziness, syncope, cough, fever, chills, dysuria, odynophagia.     She reports a recent viral infection with nausea and diarrhea which has been improving over the past few days.     She does have ongoing joint pain and has an upcoming appointment with rheumatology next month.       Current Outpatient Medications   Medication Instructions    acetaminophen (TYLENOL) 650 mg, oral, Every 6 hours PRN    Alcohol Prep Pads pads, medicated     amLODIPine (Norvasc) 2.5 mg tablet TAKE ONE (1) TABLET BY MOUTH ONCE DAILY    aspirin 81 mg EC tablet TAKE ONE (1) TABLET BY MOUTH ONCE A DAY    blood sugar diagnostic (OneTouch Verio test strips) strip 4 times daily    calcitriol (Rocaltrol) 0.5 mcg capsule TAKE ONE (1) CAPSULE BY MOUTH ONCE DAILY.    calcium carbonate (Oscal) 500 mg calcium (1,250 mg) tablet TAKE ONE (1) TABLET BY MOUTH TWICE DAILY. TAKE AT LEAST 2 HOURS BEFORE OR 2 HOURS AFTER IMMUNOSUPPRESSION MEDICATIONS.    chlorhexidine (Peridex) 0.12  % solution oral, Every 12 hours, RINSE MOUTH WITH 15ML (1 CAPFUL) FOR 30 SECONDS AM AND PM AFTER TOOTHBRUSHING. EXPECTORATE AFTER RINSING, DO NOT SWALLOW    dapsone 100 mg, oral, Daily    diclofenac sodium (VOLTAREN) 2 g, Topical, 4 times daily    docusate sodium (COLACE) 100 mg, oral, 2 times daily PRN    ergocalciferol (VITAMIN D-2) 50,000 Units, oral, Weekly    ezetimibe (Zetia) 10 mg tablet     ferrous sulfate 325 (65 Fe) MG tablet TAKE ONE (1) TABLET BY MOUTH DAILY.    fluconazole (DIFLUCAN) 200 mg, oral, Every 24 hours    insulin lispro (HumaLOG) 100 unit/mL injection USE FOR SLIDING SCALE INSULIN COVERAGE UP TO 4 TIMES DAILY AS DIRECTED. MAX OF 40 UNITS PER DAY.    lactulose 20 gram/30 mL oral solution 30 mL, oral, As needed    levothyroxine (Synthroid, Levoxyl) 200 mcg tablet TAKE ONE (1) TABLET BY MOUTH ONCE DAILY.    magnesium oxide (Mag-Ox) 400 mg (241.3 mg magnesium) tablet TAKE TWO (2) TABLETS BY MOUTH DAILY    multivitamin tablet 1 tablet, oral, Daily    nystatin (Mycostatin) 100,000 unit/mL suspension 5 mL, oral, 4 times daily    oxyCODONE (ROXICODONE) 5 mg, oral, Every 12 hours, PRN left groin pain    pantoprazole (ProtoNix) 40 mg EC tablet TAKE ONE (1) TABLET BY MOUTH DAILY.    polyethylene glycol (GLYCOLAX, MIRALAX) 17 g, oral, Daily PRN    predniSONE (Deltasone) 5 mg tablet TAKE ONE (1) TABLET BY MOUTH ONCE DAILY.    rosuvastatin (Crestor) 40 mg tablet TAKE ONE (1) TABLET BY MOUTH DAILY.    sirolimus (RAPAMUNE) 1.5 mg, oral, Daily    tacrolimus (Prograf) 1 mg capsule Take 2 mg in the morning and 1 mg in the evening.    tacrolimus (PROGRAF) 0.5 mg, oral, Every evening    traMADol (Ultram) 50 mg tablet 1 tablet, oral, Every 6 hours PRN    valGANciclovir (VALCYTE) 450 mg, oral, Daily    voriconazole (VFEND) 200 mg, oral, 2 times daily       Exam:   Visit Vitals  /88   Pulse 100   Temp 36.2 °C (97.1 °F) (Temporal)   Wt 92.9 kg (204 lb 12.8 oz)   SpO2 99%   BMI 38.70 kg/m²   BSA 2 m²     On exam  Ms. Yimi Bowles appears her stated age, is alert and oriented x3, and in no acute distress. She appears to have a sty on her right upper eye lid. Her sclera are anicteric and her oropharynx has moist mucous membranes. There is no evidence thrush. Her neck is supple and without thyromegaly. The JVP is ~5 cm of water above the right atrium. There is no cervical, submandibular, or axillary lymphadenopathy. Her cardiac exam has regular rhythm, normal S1, S2. No S3/4. There are no murmurs. Her lungs are clear to auscultation bilaterally and there is no dullness to percussion. Her abdomen is soft, nontender with normoactive bowel sounds. There is no HJR. The extremities are warm and without edema. The skin is dry. There is no rash. The distal pulses are 2+ in all four extremities. Her mood and affect are appropriate for todays encounter.      Labs:   CMP:  Recent Labs     11/09/23  0851 10/31/23  0839 10/13/23  0847 09/27/23  0823 09/06/23  0845    138 137 137 140   K 4.3 4.0 4.8 4.1 3.9    105 102 104 106   CO2 27 25 27 27 25   ANIONGAP 12 12 13 10 13   BUN 14 18 15 17 16   CREATININE 1.19* 1.01 1.17* 1.03 1.20*   EGFR 62 76 64  --   --    MG 1.73 1.89 1.89 1.60 1.79     Recent Labs     11/09/23  0851 10/31/23  0839 10/13/23  0847 09/27/23  0823 09/06/23  0845 08/28/23  0850 03/24/23  0842 03/17/23  0838   ALBUMIN 4.2 3.9 4.3 3.9 3.9 4.4   < > 3.8   ALKPHOS 44 46 50  --  44 50   < > 54   ALT 9 9 8  --  8 8   < > 8   AST 13 14 12  --  11 15   < > 17   BILITOT 0.3 0.4 0.4  --  0.2 0.4   < > 0.4   LIPASE  --   --   --   --   --   --   --  8*    < > = values in this interval not displayed.     CBC:  Recent Labs     11/09/23  0851 10/31/23  0839 09/27/23  0822 08/28/23  0850 07/07/23  1151 02/02/23  0801 01/26/23  0658 01/12/23  0825 01/04/23  0824 07/07/22  0657 06/29/22  0646 06/22/22  0732 06/17/22  0823   WBC 3.4* 4.6 3.4* 4.3* 4.8   < > 5.1   < > 5.4   < > 7.2   < > 3.3*   ANC  --   --   --   --   --   --   3.67  --  1.66  --  4.46  --  0.63*   HGB 12.4 10.9* 11.6* 11.6* 11.0*   < > 8.7*   < > 8.4*   < > 9.6*   < > 8.9*   HCT 39.1 35.9* 36.0 36.2 35.7*   < > 29.0*   < > 27.3*   < > 33.1*   < > 31.1*    268 252 294 253   < > 272   < > 224   < > 206   < > 298   MCV 83 87 84 85 83   < > 98   < > 99   < > 92   < > 95    < > = values in this interval not displayed.     COAG:   Recent Labs     01/18/23  1557 12/14/22  1548 11/10/22  2322 08/10/22  0629 05/03/22  0607 04/09/22  0110 04/08/22  2129 04/06/22  0414 04/02/22  0544 03/31/22  2339 03/31/22  1151 03/25/22  0912 03/25/22  0350 02/10/22  1747 02/02/22  1703   INR  --  1.1 1.1 1.2*   < >  --   --   --    < > 1.3* 1.3*   < > 1.5*   < > 1.2*   HAUF  --   --   --   --   --  0.5 0.4 0.4   < >  --   --    < > 0.5   < >  --    HAPTOGLOBIN 38  --   --   --   --   --   --   --   --   --   --   --   --   --  111   FIBRINOGEN  --   --   --   --   --   --   --   --   --  282 202  --  346  --  346    < > = values in this interval not displayed.     ABO:   Recent Labs     12/14/22  1548   ABO O     HEME/ENDO:  Recent Labs     07/18/23  0924 03/17/23  0838 01/18/23  1557 01/04/23  0825 12/14/22  1541 11/13/22  0519 08/08/22  2326 07/12/22  0722 05/20/22  1521 02/06/22  1614 02/02/22  1703 02/01/22  0048 09/18/20  0715 09/14/20  1234 04/01/19  1218 02/20/19  1015   FERRITIN  --   --   --   --   --   --   --   --  115  --  24  --   --  32  --  72   IRONSAT  --   --  24*  --   --   --   --   --  25  --  7* 9*   < >  --   --  24*   TSH 21.69* 6.13*  --  2.50 40.64*  --    < > 0.61  --    < > 0.32* 0.57   < >  --    < >  --    HGBA1C  --  5.7*  --   --  6.4* 6.1*  --  5.8*  --   --   --  6.3*   < >  --   --   --     < > = values in this interval not displayed.      CARDIAC:   Recent Labs     09/06/23  0845 03/29/23  0840 01/18/23  1557 11/10/22  2322 05/02/22  0855 04/25/22  0858 03/30/22  2143 03/30/22  0022 03/29/22  0032 03/28/22  0052   LDH  --   --  585*  --   --   --  270*  254* 216 261*   TROPHS  --   --   --  113*  --   --   --   --   --   --    BNP 66 60  --  53 1,191* 1,797*  --   --   --   --      Recent Labs     07/18/23  0924 03/17/23  0838 11/10/22  2322   CHOL 279* 211* 177   LDLF 184* 109* 81   HDL 63.4 62.9 52.2   TRIG 158* 198* 220*     MICRO:   Recent Labs     01/18/23  1557 01/04/23  0824 12/14/22  1548   CRP 0.68 0.97 0.48     IS:   Recent Labs     11/09/23  0851 10/31/23  0839 10/13/23  0847 09/27/23  0822 09/06/23  0845   TACROLIMUS 2.5 3.9 7.2 5.3 4.4   SIROLIMUS 4.4 6.4 11.9 17.6 14.8     No results found for the last 90 days.    RHC (11/29/23):  1. RA 1, PA 20/11/(14), PCW 10, sat 70%, CO/CI 6.5/3.4, SVR 1247 dynes, PVR 0.6 DISLA.  2. Preserved CO/CI with normal filling pressures.        Impression/Plan:  Ms. Yimi Bowles is a 33F with a PMHx sig for stage D HFrEF (PPCM) s/p ICD s/p CardioMEMs, hypothyroidism 2/2 thyroidectomy on replacement therapy, obesity s/p gastric bypass (2017), and SLE who is s/p OHT (3/31/2022) with a post-OHT course complicated by RIJ/RUE DVTs, leukopenia, left groin seroma, worsening renal function, asymptomatic 2R ACR (11/2022) treated with steroids, and thrush who presents to the Transplant clinic for her routine 21 month post-transplant visit which includes RHC w/ bx, ECHO, labs, and clinic.      1) Stage D NICM s/p OHT (3/31/22) [CMV -/+, Toxo -/-]  pAMR1 (7/20/22) with preserved graft function and normal resting hemodynamics. Immunosuppression escalated, negative DSA. Repeat EMBx (8/10/22) without rejection. MMF induced colitis so transitioned to AZA. Then found to have asymptomatic 2R ACR (11/9/22) with stable hemodynamics and graft function; treated with IV steroids, optimization of tacrolimus dosing and increase of azathioprine. AZA then switched to sirolimus in light of worsening renal function.     DSA:   9/15/22: negative  12/14/22: negative    #Immunosuppression  Induction: Basiliximab  MMF stopped due to colitis  AZA stopped due  to worsening renal function and rejection episode  -decrease tacrolimus to 1.5 mg bid (FK 6.2; 11/29/23, goal 3-5)  -decrease sirolimus to 1 mg daily, (siro 9.7; 11/9/23, goal 7-9)  -target sum total siro + tac (10-12)  -c/w prednisone 5 mg daily (pred for life given rejection episodes both ACR & AMR)   -monitor DSAs closely  #Prophylaxis  -c/w ASA 81 mg daily, Ca/D, PPI, rosuvastatin 40 mg daily   #HSV2  Treated with a 1 week course of valacyclovir  -f/u with transplant ID  #Health maintenance  CV: no CAD/CAV (4/3/23);  (7/18/23)  Derm: annual skin exam  Onc: colonoscopy (last 8/2022, next due at age appropriate level)/zahraa/pap UTD  Bone: normal DEXA (6/16/23)  Renal: Cr 0.84 (11/29/23)    2) DVTs  Resolution of her RIJ DVT, persistent superficial RUE basilic vein thrombus. Given concerns for bleeding/oozing and anemia. Anticoagulation stopped (4/27/22).     3) HTN  BP stable  -c/w amlodipine 2.5 mg daily    4) Hypothyroidism  -c/w levothyroxine  -f/u with endocrinology    5) SLE  Ongoing concern by pt due to pain.   -f/u with rheumatology    6) Hypomagnesemia  -c/w supplementation as needed    7) Obesity s/p gastric bypass (sleeve gastrectomy; 2017)    8) Anemia  Hb 10.4 (11/29/23)  -c/w iron 325 mg bid    9) MMF colitis  Symptoms resolved after stopping MMF.    10) HIRAM  Cr normalized after transition to mTOR  -f/u with transplant nephrology    11) Dyslipidemia  Lipids (7/18/23): tChol 279, HDL 63.4, , trigs 158  -c/w rosuvastatin 40 mg daily, vascepa 1 gram bid  -monitor triglycerides given ongoing use of mTOR      F/U: 3 months per post-OHT protocol    ____________________________________________________________  Jeyson Cornell DO   Section of Advanced Heart Failure and Cardiac Transplantation  Division of Cardiovascular Medicine  [facility]

## 2023-11-28 PROBLEM — Z94.1 HEART TRANSPLANT RECIPIENT (MULTI): Status: ACTIVE | Noted: 2023-01-01

## 2023-11-29 NOTE — PATIENT INSTRUCTIONS
-Thank you for visiting the  Heart Transplant Clinic today!     -Please bring a list of your medications to every appointment.     -We will schedule you for a follow up visit in 3 months. We will also schedule you a left heart cath, bone density exam, EKG, and chest xray around the same time as part of your annual appointments.     -START taking your Tacrolimus 1.5 mg twice a day.     -START taking your Sirolimus 1 mg daily.     -[facility]ills [facility]     -Please repeat labs in about 2 weeks.     -We will follow up with you on the results of your biopsy.

## 2023-11-29 NOTE — PROGRESS NOTES
Pharmacy Medication History Review    Charla Bowles is a 33 y.o. female admitted for Heart replaced by transplant (CMS/[facility]). Pharmacy reviewed the patient's [id number] medications and allergies for accuracy.    The list below reflects the updated PTA list. Comments regarding how patient may be taking medications differently can be found in the Admit Orders Activity  Prior to Admission Medications   Prescriptions Last Dose Informant   Alcohol Prep Pads pads, medicated     amLODIPine (Norvasc) 2.5 mg tablet 2023    Sig: TAKE ONE (1) TABLET BY MOUTH ONCE DAILY   aspirin 81 mg EC tablet 2023    Sig: TAKE ONE (1) TABLET BY MOUTH ONCE A DAY   blood sugar diagnostic (OneTouch Verio test strips) strip     Si times a day.   calcitriol (Rocaltrol) 0.5 mcg capsule 2023    Sig: TAKE ONE (1) CAPSULE BY MOUTH ONCE DAILY.   calcium carbonate (Oscal) 500 mg calcium (1,250 mg) tablet 2023    Sig: TAKE ONE (1) TABLET BY MOUTH TWICE DAILY. TAKE AT LEAST 2 HOURS BEFORE OR 2 HOURS AFTER IMMUNOSUPPRESSION MEDICATIONS.   docusate sodium (Colace) 100 mg capsule Past Week    Sig: Take 1 capsule (100 mg) by mouth 2 times a day as needed.   ferrous sulfate 325 (65 Fe) MG tablet 2023    Sig: TAKE ONE (1) TABLET BY MOUTH DAILY.   insulin lispro (HumaLOG) 100 unit/mL injection 2023    Sig: USE FOR SLIDING SCALE INSULIN COVERAGE UP TO 4 TIMES DAILY AS DIRECTED. MAX OF 40 UNITS PER DAY.   levothyroxine (Synthroid, Levoxyl) 200 mcg tablet 2023    Sig: TAKE ONE (1) TABLET BY MOUTH ONCE DAILY.   Patient taking differently: Take 2 tablets (400 mcg) by mouth once daily.   magnesium oxide (Mag-Ox) 400 mg (241.3 mg magnesium) tablet 2023    Sig: TAKE TWO (2) TABLETS BY MOUTH DAILY   multivitamin tablet 2023    Sig: Take 1 tablet by mouth once daily.   pantoprazole (ProtoNix) 40 mg EC tablet 2023    Sig: TAKE ONE (1) TABLET BY MOUTH DAILY.   predniSONE (Deltasone) 5 mg tablet  11/28/2023    Sig: TAKE ONE (1) TABLET BY MOUTH ONCE DAILY.   rosuvastatin (Crestor) 40 mg tablet 11/28/2023    Sig: TAKE ONE (1) TABLET BY MOUTH DAILY.   sirolimus (Rapamune) 0.5 mg tablet 11/28/2023    Sig: Take 3 tablets (1.5 mg) by mouth once daily.   tacrolimus (Prograf) 0.5 mg capsule 11/28/2023    Sig: Take 1 capsule (0.5 mg) by mouth once daily in the evening.   tacrolimus (Prograf) 1 mg capsule 11/28/2023    Sig: Take 2 mg in the morning and 1 mg in the evening.      Facility-Administered Medications: None        The list below reflects the updated allergy list. Please review each documented allergy for additional clarification and justification.  Allergies  Reviewed by Arian Jones RN on 11/29/2023        Severity Reactions Comments    Dapagliflozin Not Specified GI bleeding UTI Urinary tract infection    Empagliflozin Not Specified Unknown     Topiramate Not Specified Nausea Only, Nausea/vomiting     Latex Low Rash     Mycophenolate Mofetil Low Rash             Patient was unable to be assessed for M2B at discharge. Pharmacy has been updated to [facility].    Sources used to complete the med history include: Patient interview - good historian of medications/ Pharmacy - [facility],  Specialty/ Chart review    Clair Frye PharmD  Transitions of Care Pharmacist  [facility]  Please reach out via Secure Chat for questions, or if no response call [employer] or [employer]

## 2023-11-29 NOTE — Clinical Note
Sheath was exchanged in the right internal jugular vein with SHEATH, PINNACLE, 10 CM,  5FR INTRODUCER, 5FR SARA, 2.5 CM DIALATOR.
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
## 2023-11-29 NOTE — Clinical Note
Sheath was exchanged in the right internal jugular vein with ACCESS KIT, S-LARRY MINI, 4FR 10CM 0.018IN 40CM, NT/PT, ECHO ENHANCE NEEDLE.

## 2023-11-29 NOTE — Clinical Note
Sheath was exchanged in the right internal jugular vein with SHEATH, PINNACDANIE, 10 CM,  7FR INTRODUCER, 7FR SARA, 2.5 CM DIALATOR.

## 2023-11-29 NOTE — POST-PROCEDURE NOTE
Physician Transition of Care Summary  Invasive Cardiovascular Lab    Procedure Date: 11/29/2023  Attending:    * Jeyson Cornell - Primary  Resident/Fellow/Other Assistant: Surgeon(s) and Role:    Indications:   Pre-op Diagnosis     * Heart replaced by transplant (CMS/HCC) [Z94.1]    Post-procedure diagnosis:   Post-op Diagnosis     * Heart replaced by transplant (CMS/HCC) [Z94.1]    Procedure(s):   Right Heart Cath  39735 - CO RIGHT HEART CATH O2 SATURATION & CARDIAC OUTPUT    Endomyocardial Biopsy  17599 - CO ENDOMYOCARDIAL BIOPSY        Procedure Findings:   Preserved CO/CI with normal filling pressures    Description of the Procedure:   RHC w/EMBx    Complications:   none      Estimated Blood Loss:   5 mL    Anesthesia: Moderate Sedation Anesthesia Staff: No anesthesia staff entered.    Any Specimen(s) Removed:   Order Name Source Comment Collection Info Order Time   CBC WITH AUTO DIFFERENTIAL Blood, Venous  Collected By: Sloan Martinez RN 11/29/2023  7:42 AM     Release result to MyChart   Immediate        COMPREHENSIVE METABOLIC PANEL Blood, Venous  Collected By: Sloan Martinez RN 11/29/2023  7:42 AM     Release result to MyChart   Immediate        TACROLIMUS Blood, Venous  Collected By: Sloan Martinez RN 11/29/2023  7:42 AM     Release result to MyChart   Immediate        SIROLIMUS Blood, Venous  Collected By: Sloan Martinez RN 11/29/2023  7:42 AM     Release result to MyChart   Immediate        MAGNESIUM Blood, Venous  Collected By: Sloan Martinez RN 11/29/2023  7:42 AM     Release result to MyChart   Immediate        SURGICAL PATHOLOGY EXAM HEART TRANSPLANT BIOPSY  Collected By: Sloan Martinez RN 11/29/2023  7:53 AM     How many specimens will you need? (If more than 16 start a new case)   1          Source of Specimen A:   HEART TRANSPLANT BIOPSY          Procedure:   RHC with EMBx          Specify Site:   RV          Fixative:   Fresh          Clinical History/Special  Request:   s/p OHT            Disposition:   Home with follow up in the Transplant clinic      Electronically signed by: Jeyson Cornell DO, 11/29/2023 9:34 AM

## 2023-12-01 NOTE — PROGRESS NOTES
"Subjective   Patient ID: Charla Bowles is a 33 y.o. female who presents for Follow-up.    HPI jarday-    PHMx:   Lupus, CHF (3/2021), thyroid, CKD, s/p bariatric surgery  SurgHx:   - heart transplant 2/2  - hysterectomy  OBHx:   Meds:                   Review of Systems    Objective   /84 (BP Location: Left arm, Patient Position: Sitting)   Pulse 110   Temp 36.2 °C (97.2 °F)   Ht 1.562 m (5' 1.5\")   Wt 93 kg (205 lb)   SpO2 95%   BMI 38.11 kg/m²      Physical Exam     Assessment/Plan   There are no diagnoses linked to this encounter.    Follow up/Return to the clinic in {RTC:86648}    {Attending Supervision:64365::\"discussed\":1} with attending physician,  ***    Coco French MD  Family Medicine, PGY-3  "

## 2023-12-01 NOTE — PROGRESS NOTES
Subjective   Patient ID: Charla Bowles is a 33 y.o. female with PMH of lupus and lupus cardiomyopathy s/p heart transplant who presents for Follow-up/ health care maintenance    HPI    #Establish Care  - name pronounced Jar-day  PHMx: Lupus, CHF s/p heart transplant thyroid disorder, CKD, s/p bariatric surgery  SurgHx: heart transplant, hysterectomy  OBHx:   SocHx: never smoker, non drinker, denies any other drugs    # Health Maintenance  - Dental Care: denies current tooth pain  - Vision: follows with optho, no current vision concerns  - Hearing: denies recent hearing loss   - Smoking: never a smoker  - EtOH: Alcohol Use: No, patient does not drink alcohol.  - Illicit substances: denied.    IMMUNIZATIONS  Immunization History   Administered Date(s) Administered    DTP 1991    DTaP vaccine, pediatric  (INFANRIX) 1996    Flu vaccine (IIV4), preservative free *Check age/dose* 2016, 2017, 2020, 2022, 2023    HPV, Quadrivalent 10/30/2008, 2008, 2009    Hep A / Hep B 2022    Hepatitis B vaccine, pediatric/adolescent (RECOMBIVAX, ENGERIX) 2000    HiB, unspecified 1991    Influenza Whole 2009    Influenza, Unspecified 10/18/2012    Influenza, seasonal, injectable, preservative free 10/30/2008, 2016    Meningococcal MCV4P 10/30/2008    OPV 1991, 1996    Pfizer Purple Cap SARS-CoV-2 2022, 2022    Tdap vaccine, age 7 year and older (BOOSTRIX) 10/30/2008, 2009, 2022      - Flu vaccine: received annually, due this year  - COVID vaccine: completed primary series and recommended boosters  - Prevnar (PCV20): likely due given immunocompromised status, will discuss at next visit  - Tdap: received in , next due   - HPV (<45): received  - Shingrix (50+): not yet due    SCREENINGS  - STI screen(<25 annually): last GC/CT- ordered today  - Lifetime HIV, syph, HepC:  WNL  - Lipid Panel (35M,45F):  7/2023, elevated LDL at 184  - DM screening( >35 if BMI 25+): 3/2023 prediabetic at 5.7%  - HTN screening: wnl today  - Colonoscopy (45-75): 8/2022 for diarrhea, found small hemorrhoids, no polyps, path report w/ melanosis coli and medication effect    FEMALE  - Menstrual Status: S/P complete hysterectomy   --- pt undewent hysterectomy 2019 for hemorrhage after a LEEP for an abnormal pap smear  - Pregnancy history: No obstetric history on file.  - Sexual History: not currently sexually active  - Cervical CA: (21-65) last prior pap 2019 // h/o abnormal pap? 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      Current Outpatient Medications   Medication Instructions    Alcohol Prep Pads pads, medicated     amLODIPine (Norvasc) 2.5 mg tablet TAKE ONE (1) TABLET BY MOUTH ONCE DAILY    aspirin 81 mg EC tablet TAKE ONE (1) TABLET BY MOUTH ONCE A DAY    blood sugar diagnostic (OneTouch Verio test strips) strip 4 times daily    calcitriol (Rocaltrol) 0.5 mcg capsule TAKE ONE (1) CAPSULE BY MOUTH ONCE DAILY.    calcium carbonate (Oscal) 500 mg calcium (1,250 mg) tablet TAKE ONE (1) TABLET BY MOUTH TWICE DAILY. TAKE AT LEAST 2 HOURS BEFORE OR 2 HOURS AFTER IMMUNOSUPPRESSION MEDICATIONS.    docusate sodium (COLACE) 100 mg, oral, 2 times daily PRN    ferrous sulfate, 325 mg ferrous sulfate, tablet TAKE ONE (1) TABLET BY MOUTH DAILY.    insulin lispro (HumaLOG) 100 unit/mL injection USE FOR SLIDING SCALE INSULIN COVERAGE UP TO 4 TIMES DAILY AS DIRECTED. MAX OF 40 UNITS PER DAY.    levothyroxine (Synthroid, Levoxyl) 200 mcg tablet TAKE ONE (1) TABLET BY MOUTH ONCE DAILY.    magnesium oxide (Mag-Ox) 400 mg (241.3 mg magnesium) tablet TAKE TWO (2) TABLETS BY MOUTH DAILY    multivitamin tablet 1 tablet, oral, Daily    nystatin (Mycostatin) cream  "Topical, 2 times daily    pantoprazole (ProtoNix) 40 mg EC tablet TAKE ONE (1) TABLET BY MOUTH DAILY.    predniSONE (Deltasone) 5 mg tablet TAKE ONE (1) TABLET BY MOUTH ONCE DAILY.    rosuvastatin (Crestor) 40 mg tablet TAKE ONE (1) TABLET BY MOUTH DAILY.    sirolimus (RAPAMUNE) 1 mg, oral, Daily    tacrolimus (Prograf) 1 mg capsule Take 1.5 mg every 12 hours.    tacrolimus (PROGRAF) 0.5 mg, oral, Every evening       Objective   Vitals: /84 (BP Location: Left arm, Patient Position: Sitting)   Pulse 110   Temp 36.2 °C (97.2 °F)   Ht 1.562 m (5' 1.5\")   Wt 93 kg (205 lb)   SpO2 95%   BMI 38.11 kg/m²      Physical Exam  Constitutional:       General: She is not in acute distress.  HENT:      Head: Normocephalic and atraumatic.   Eyes:      Conjunctiva/sclera: Conjunctivae normal.   Cardiovascular:      Heart sounds: Normal heart sounds. No murmur heard.  Pulmonary:      Effort: Pulmonary effort is normal.      Breath sounds: Normal breath sounds.   Abdominal:      Palpations: Abdomen is soft.   Genitourinary:     Comments: Speculum exam:  Examination of the cervix revealed thick whitish discharge, but normal findings. Vaginal cuff was overall normal. A Pap smear was performed, along with trich and GC/CT testing. Minimal to no bleeding. Patient the examination well.   Musculoskeletal:         General: Normal range of motion.   Neurological:      Mental Status: She is alert and oriented to person, place, and time.      Comments: CN 2-12 grossly intact   Psychiatric:         Mood and Affect: Mood normal.       Assessment/Plan   Diagnoses and all orders for this visit:  Healthcare maintenance  Comments:  34 yo F heart transplant recipient 2/2 lupus who is overall in good health.. UTD on preventative care.  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 Per ACCP guidelines due for pap smear yearly for 3 years then " surveillance pap every 3 years. Completed pap today.  Orders:  -     THINPREP PAP TEST  -     HPV DNA High Risk With Genotype  -     C. Trachomatis / N. Gonorrhoeae, Amplified Detection  -     Trichomonas vaginalis, Nucleic Acid Detection  Immunization due  Comments:  Due for flu shot, received today.  Orders:  -     Flu vaccine (IIV4) age 6 months and greater, preservative free  Yeast vaginitis  Comments:  Notable thick whitish discharge on speculum exam. Pt recently on antibiotic. Given diflucan per patient preference.  Orders:  -     fluconazole (Diflucan) 150 mg tablet; Take 1 tablet (150 mg) by mouth 1 time for 1 dose.  Heart replaced by transplant (CMS/[facility])  Comments:  Immunocompromised patient given heart transplant status and anti-rejection meds. Following closely w/ transplant team.  Obesity (BMI 30-39.9)     Follow up/Return to the clinic in 1 year    Attending Supervision: discussed with attending physician, Dr. Nano French MD  Family Medicine, PGY-3

## 2023-12-02 PROBLEM — E66.9 OBESITY (BMI 30-39.9): Status: ACTIVE | Noted: 2023-01-01

## 2023-12-02 PROBLEM — Z23 IMMUNIZATION DUE: Status: ACTIVE | Noted: 2023-01-01

## 2023-12-02 PROBLEM — Z00.00 HEALTHCARE MAINTENANCE: Status: ACTIVE | Noted: 2023-01-01

## 2023-12-02 PROBLEM — B37.31 YEAST VAGINITIS: Status: ACTIVE | Noted: 2023-01-01

## 2023-12-04 NOTE — TELEPHONE ENCOUNTER
Patient called again and stated that she needs a new script sent to [facility] of her Tacrolimus. New script was sent to Dr. Cornell to approve.

## 2023-12-07 NOTE — PROGRESS NOTES
Informants: Patient and EMR.  PP: 33-year-old female with history of heart transplant for postpartum cardiomyopathy, hypothyroidism secondary to total thyroidectomy for compressive goiter, systemic lupus erythematosus, fibromyalgia, chronic pain.  CC: Persistent pain in her upper and lower extremities as well as the lower back.  [place]: She had been in her usual state of health with intermittent musculoskeletal pains lower back pain, shortness of breath and swelling during her pregnancy with twins.  Following delivery she was noted to have cardiomegaly.  She had significant numbness in her feet with right lower extremity pain with unremarkable neurological examination.  She had developed a facial rash that was exacerbated by sun exposure and in the winter with cold exposure.  She had some hair loss, intermittent epistaxis without obvious nasal ulcers.  She was treated with gabapentin at bedtime due to the sedation with taking it during the day.  She was also noted to have a low titer positive RITA.  She was subsequently diagnosed with systemic lupus erythematosus.  She was treated with low-dose prednisone.  She had difficulty tolerating mycophenolate and leflunomide.  Hydroxychloroquine was discontinued due to cardiomyopathy and congestive heart failure.  She was subsequently treated with Rituxan infusion therapy with some improvement in the generalized musculoskeletal pain.  She was also noted to have slightly elevated serum creatinine..  She had recurrent episodes of congestive heart failure and significant lower extremity edema.  She subsequently underwent  donor heart transplant.  She had total thyroidectomy for compressive goiter with difficult to control of hypothyroidism.  She has had significant improvement in fluid overload but continues to have complaints of rather generalized musculoskeletal pain.  She had had ketamine infusions in the past with improvement in musculoskeletal pain.  She had  endomyocardial biopsy that this is suggestive of transplant rejection but subsequent biopsies as not revealed any continued cardiac transplant rejection.  PH: Allergies: Topiramate, mycophenolate, Depagliflozin, empagliflozin, latex; illnesses: Postpartum cardiomyopathy, vitamin D deficiency, hypothyroidism secondary to total thyroidectomy, renal insufficiency, right internal jugular and right upper extremity deep vein thrombosis (3/31/2022), HSV, fibromyalgia, systemic lupus erythematosus; surgeries: Gastric bypass surgery for weight reduction, total thyroidectomy for compressive goiter, cadaver cardiac transplant, endomyocardial biopsy (2023), tonsillectomy and adenoidectomy (2009), total abdominal hysterectomy with bilateral salpingo-oophorectomy (2019) ICD pacemaker placement, removal of ICD pacemaker with repair of left femoral artery laceration ECMO decannulation and median sternotomy (3/31/2022).  SH: She denies any tobacco alcohol or illicit drug use.  FH: Her father has hypertension, diabetes mellitus.  Her mother has hypertension and vitamin B12 deficiency.  She has 4 brothers and 4 sisters who are all healthy.  She has a sister with sickle cell disease.  She has 2 sons who are twins both with sickle cell trait.  She has no daughters.  She has a paternal grandmother  with lupus congestive heart failure and diabetes mellitus.  She has a maternal aunt with sarcoidosis.  She has a paternal grandmother with cancer.  PX: She is a well-developed, well-nourished, black female.  The lungs, heart, abdomen, and extremities are benign.  The musculoskeletal examination does not show any joint effusions.  There is mild pain on passive range of motion of the right shoulder.  DEXA bone density (2023) L1-L4 T-score -0.7, left total femur T-score -1.0, left femoral neck T-score -1.0.  Laboratory (2023) magnesium 1.42, sirolimus 9.7,Tacrolimus 6.2, glucose 91, potassium 3.8, BUN 13,  creatinine 0.84, calcium 7.4, albumin 3.2, alkaline phosphatase 43, AST 16, ALT 7, WBC 3.1, hemoglobin 10.4, hematocrit 34.0, MCV 85, MCHC 30.6, platelets 265.  Impression: 33-year-old black female with postpartum cardiomyopathy requiring cardiac transplant, status post bariatric surgery, total thyroidectomy, on chronic prednisone therapy with normal bone mineral density study, normocytic anemia, low magnesium and low serum calcium, systemic lupus erythematosus appears to be asymptomatic.  She has chronic pain syndrome that had responded to ketamine infusions previously.  Plan: No new medications were prescribed.  She is referred to pain management for consideration for resuming ketamine infusion therapy.  She is to return at the next available office appointment.

## 2023-12-07 NOTE — PROGRESS NOTES
I reviewed the resident/fellow's documentation and discussed the patient with the resident/fellow. I agree with the resident/fellow's medical decision making as documented in the note.      I reviewed surgical histology from her LEEP, which did show CIN2.  Therefore, she is a candidate for continued Pap tests, even though she had hysterectomy, for 25 years, according to ASCCP guideline.    Paulette Mcgill MD

## 2023-12-20 NOTE — TELEPHONE ENCOUNTER
Patient called to speak with Claudia, did not want to give any information as to why she was calling. Let her know that Claudia will call back when available.

## 2023-12-20 NOTE — TELEPHONE ENCOUNTER
Patient called and stated that her son tested positive for influenza A and that her son's pediatrician wanted her to let our team know. Per patient, she feels fine and has no signs and symptoms. Educated patient to continue to monitor her symptoms and wear a mask around her son if possible. Patient states that she will call if she experiences symptoms.

## 2023-12-27 NOTE — RESULT ENCOUNTER NOTE
Spoke w/ pt over the phone. Pap test of vaginal cuff negative. Given pt h/o VAIBHAV 2 in 2019 w/ LEEP and then hysterectomy in 2019. Per ASCCP guidelines, will need pap once a year for 3 years. Next one due in 2024 and 2025. Patient aware and agreeable. Also STD testing all negative.

## 2023-12-27 NOTE — TELEPHONE ENCOUNTER
Patient called and states that she was exposed to COVID by her sister over the holiday. Patient denies any symptoms aside from a headache and asked if she should take a COVID test. Informed patient that if she wants to take a COVID test she can to be safe. Patient states that she will go this afternoon and pick some up. Patient was also advised to monitor her symptoms and wear a mask around family and friend who may be sick.

## 2024-01-01 ENCOUNTER — APPOINTMENT (OUTPATIENT)
Dept: CARDIOLOGY | Facility: HOSPITAL | Age: 34
DRG: 003 | End: 2024-01-01
Payer: MEDICARE

## 2024-01-01 ENCOUNTER — APPOINTMENT (OUTPATIENT)
Dept: RADIOLOGY | Facility: HOSPITAL | Age: 34
DRG: 003 | End: 2024-01-01
Payer: MEDICARE

## 2024-01-01 ENCOUNTER — LAB REQUISITION (OUTPATIENT)
Dept: LAB | Facility: CLINIC | Age: 34
DRG: 003 | End: 2024-01-01
Payer: MEDICARE

## 2024-01-01 ENCOUNTER — APPOINTMENT (OUTPATIENT)
Dept: RHEUMATOLOGY | Facility: CLINIC | Age: 34
End: 2024-01-01
Payer: COMMERCIAL

## 2024-01-01 ENCOUNTER — ANESTHESIA EVENT (OUTPATIENT)
Dept: OPERATING ROOM | Facility: HOSPITAL | Age: 34
DRG: 003 | End: 2024-01-01
Payer: MEDICARE

## 2024-01-01 ENCOUNTER — DOCUMENTATION (OUTPATIENT)
Dept: TRANSPLANT | Facility: HOSPITAL | Age: 34
End: 2024-01-01
Payer: COMMERCIAL

## 2024-01-01 ENCOUNTER — APPOINTMENT (OUTPATIENT)
Dept: VASCULAR MEDICINE | Facility: HOSPITAL | Age: 34
DRG: 003 | End: 2024-01-01
Payer: MEDICARE

## 2024-01-01 ENCOUNTER — SPECIALTY PHARMACY (OUTPATIENT)
Dept: PHARMACY | Facility: CLINIC | Age: 34
End: 2024-01-01

## 2024-01-01 ENCOUNTER — APPOINTMENT (OUTPATIENT)
Dept: RESPIRATORY THERAPY | Facility: HOSPITAL | Age: 34
DRG: 003 | End: 2024-01-01
Payer: MEDICARE

## 2024-01-01 ENCOUNTER — TELEPHONE (OUTPATIENT)
Dept: TRANSPLANT | Facility: HOSPITAL | Age: 34
End: 2024-01-01

## 2024-01-01 ENCOUNTER — ANESTHESIA (OUTPATIENT)
Dept: OPERATING ROOM | Facility: HOSPITAL | Age: 34
DRG: 003 | End: 2024-01-01
Payer: MEDICARE

## 2024-01-01 ENCOUNTER — APPOINTMENT (OUTPATIENT)
Dept: OTHER | Facility: HOSPITAL | Age: 34
DRG: 003 | End: 2024-01-01
Payer: MEDICARE

## 2024-01-01 ENCOUNTER — HOSPITAL ENCOUNTER (INPATIENT)
Facility: HOSPITAL | Age: 34
LOS: 64 days | DRG: 003 | End: 2024-04-19
Attending: EMERGENCY MEDICINE | Admitting: STUDENT IN AN ORGANIZED HEALTH CARE EDUCATION/TRAINING PROGRAM
Payer: MEDICARE

## 2024-01-01 ENCOUNTER — CARDIOMEMS MONITORING (OUTPATIENT)
Dept: CARDIOLOGY | Facility: HOSPITAL | Age: 34
End: 2024-01-01
Payer: COMMERCIAL

## 2024-01-01 ENCOUNTER — TELEPHONE (OUTPATIENT)
Dept: TRANSPLANT | Facility: HOSPITAL | Age: 34
End: 2024-01-01
Payer: COMMERCIAL

## 2024-01-01 ENCOUNTER — DOCUMENTATION (OUTPATIENT)
Dept: TRANSPLANT | Facility: HOSPITAL | Age: 34
End: 2024-01-01

## 2024-01-01 ENCOUNTER — APPOINTMENT (OUTPATIENT)
Dept: CARDIOLOGY | Facility: HOSPITAL | Age: 34
End: 2024-01-01
Payer: COMMERCIAL

## 2024-01-01 ENCOUNTER — HOSPITAL ENCOUNTER (INPATIENT)
Facility: HOSPITAL | Age: 34
End: 2024-01-01
Attending: THORACIC SURGERY (CARDIOTHORACIC VASCULAR SURGERY) | Admitting: THORACIC SURGERY (CARDIOTHORACIC VASCULAR SURGERY)

## 2024-01-01 ENCOUNTER — APPOINTMENT (OUTPATIENT)
Dept: DIALYSIS | Facility: HOSPITAL | Age: 34
End: 2024-01-01
Payer: MEDICARE

## 2024-01-01 ENCOUNTER — LAB (OUTPATIENT)
Dept: LAB | Facility: LAB | Age: 34
End: 2024-01-01
Payer: COMMERCIAL

## 2024-01-01 ENCOUNTER — COMMITTEE REVIEW (OUTPATIENT)
Dept: TRANSPLANT | Facility: HOSPITAL | Age: 34
End: 2024-01-01
Payer: COMMERCIAL

## 2024-01-01 ENCOUNTER — PHARMACY VISIT (OUTPATIENT)
Dept: PHARMACY | Facility: CLINIC | Age: 34
End: 2024-01-01
Payer: COMMERCIAL

## 2024-01-01 ENCOUNTER — APPOINTMENT (OUTPATIENT)
Dept: TRANSPLANT | Facility: HOSPITAL | Age: 34
End: 2024-01-01
Payer: COMMERCIAL

## 2024-01-01 ENCOUNTER — FOLLOW-UP (OUTPATIENT)
Dept: TRANSPLANT | Facility: HOSPITAL | Age: 34
End: 2024-01-01

## 2024-01-01 ENCOUNTER — HOSPITAL ENCOUNTER (INPATIENT)
Dept: OPERATING ROOM | Facility: HOSPITAL | Age: 34
Discharge: HOME | DRG: 003 | End: 2024-03-27
Payer: MEDICARE

## 2024-01-01 ENCOUNTER — LAB REQUISITION (OUTPATIENT)
Dept: LAB | Facility: HOSPITAL | Age: 34
DRG: 003 | End: 2024-01-01
Payer: MEDICARE

## 2024-01-01 ENCOUNTER — CLINICAL SUPPORT (OUTPATIENT)
Dept: EMERGENCY MEDICINE | Facility: HOSPITAL | Age: 34
DRG: 003 | End: 2024-01-01
Payer: MEDICARE

## 2024-01-01 ENCOUNTER — HOSPITAL ENCOUNTER (OUTPATIENT)
Dept: OPERATING ROOM | Facility: HOSPITAL | Age: 34
Discharge: HOME | DRG: 003 | End: 2024-03-01
Payer: MEDICARE

## 2024-01-01 VITALS
BODY MASS INDEX: 32.67 KG/M2 | WEIGHT: 173.06 LBS | RESPIRATION RATE: 19 BRPM | TEMPERATURE: 98.2 F | DIASTOLIC BLOOD PRESSURE: 93 MMHG | HEIGHT: 61 IN | SYSTOLIC BLOOD PRESSURE: 103 MMHG | OXYGEN SATURATION: 88 % | HEART RATE: 83 BPM

## 2024-01-01 DIAGNOSIS — Z01.818 ENCOUNTER FOR OTHER PREPROCEDURAL EXAMINATION: ICD-10-CM

## 2024-01-01 DIAGNOSIS — M79.602 PAIN IN LEFT ARM: ICD-10-CM

## 2024-01-01 DIAGNOSIS — R09.89 ALTERED TISSUE PERFUSION: ICD-10-CM

## 2024-01-01 DIAGNOSIS — I50.22 CHRONIC CLINICAL SYSTOLIC HEART FAILURE: ICD-10-CM

## 2024-01-01 DIAGNOSIS — Z94.1 HEART TRANSPLANT STATUS: ICD-10-CM
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
## 2024-01-01 DIAGNOSIS — I82.A11 DVT OF AXILLARY VEIN, ACUTE RIGHT (MULTI): ICD-10-CM

## 2024-01-01 DIAGNOSIS — I73.9 PERIPHERAL VASCULAR DISEASE, UNSPECIFIED (CMS-HCC): ICD-10-CM

## 2024-01-01 DIAGNOSIS — M79.89 SWELLING OF BOTH LOWER EXTREMITIES: ICD-10-CM

## 2024-01-01 DIAGNOSIS — I50.22 CHRONIC SYSTOLIC CONGESTIVE HEART FAILURE: ICD-10-CM

## 2024-01-01 DIAGNOSIS — Z94.1 HEART TRANSPLANT RECIPIENT (MULTI): Primary | ICD-10-CM

## 2024-01-01 DIAGNOSIS — Z94.1 HEART REPLACED BY TRANSPLANT (MULTI): ICD-10-CM

## 2024-01-01 DIAGNOSIS — I73.89 OTHER SPECIFIED PERIPHERAL VASCULAR DISEASES: ICD-10-CM

## 2024-01-01 DIAGNOSIS — T86.21 HEART TRANSPLANT REJECTION: ICD-10-CM

## 2024-01-01 DIAGNOSIS — O22.33: ICD-10-CM

## 2024-01-01 DIAGNOSIS — Z79.899 ENCOUNTER FOR LONG-TERM (CURRENT) USE OF MEDICATIONS: ICD-10-CM

## 2024-01-01 DIAGNOSIS — T86.20 HEART TRANSPLANT AS CAUSE OF ABNORMAL REACTION OR LATER COMPLICATION: ICD-10-CM

## 2024-01-01 DIAGNOSIS — I82.90 THROMBUS: ICD-10-CM

## 2024-01-01 DIAGNOSIS — I50.41 ACUTE COMBINED SYSTOLIC (CONGESTIVE) AND DIASTOLIC (CONGESTIVE) HEART FAILURE: ICD-10-CM

## 2024-01-01 DIAGNOSIS — Z86.711 HISTORY OF PULMONARY EMBOLUS (PE): ICD-10-CM

## 2024-01-01 DIAGNOSIS — T86.21: ICD-10-CM

## 2024-01-01 DIAGNOSIS — R57.0 CARDIOGENIC SHOCK (MULTI): Primary | ICD-10-CM

## 2024-01-01 DIAGNOSIS — R09.89 SUSPECTED PULMONARY EMBOLISM: ICD-10-CM

## 2024-01-01 DIAGNOSIS — I26.09 OTHER CHRONIC PULMONARY EMBOLISM WITH ACUTE COR PULMONALE: ICD-10-CM

## 2024-01-01 DIAGNOSIS — M79.601 PAIN IN RIGHT ARM: ICD-10-CM

## 2024-01-01 DIAGNOSIS — I50.1 LEFT VENTRICULAR FAILURE, UNSPECIFIED (MULTI): ICD-10-CM

## 2024-01-01 DIAGNOSIS — T86.99: ICD-10-CM

## 2024-01-01 DIAGNOSIS — I50.20 SYSTOLIC CONGESTIVE HEART FAILURE, UNSPECIFIED HF CHRONICITY: ICD-10-CM

## 2024-01-01 DIAGNOSIS — I82.723 CHRONIC DEEP VEIN THROMBOSIS (DVT) OF BOTH UPPER EXTREMITIES, UNSPECIFIED VEIN (MULTI): ICD-10-CM

## 2024-01-01 DIAGNOSIS — N17.9 ACUTE RENAL FAILURE, UNSPECIFIED ACUTE RENAL FAILURE TYPE (CMS-HCC): ICD-10-CM

## 2024-01-01 DIAGNOSIS — I70.1 ATHEROSCLEROSIS OF RENAL ARTERY (CMS-HCC): ICD-10-CM

## 2024-01-01 DIAGNOSIS — R60.9 SWELLING: ICD-10-CM

## 2024-01-01 DIAGNOSIS — I82.622 ACUTE DEEP VEIN THROMBOSIS (DVT) OF OTHER VEIN OF LEFT UPPER EXTREMITY: ICD-10-CM

## 2024-01-01 DIAGNOSIS — I50.21 ACUTE SYSTOLIC CONGESTIVE HEART FAILURE: ICD-10-CM

## 2024-01-01 DIAGNOSIS — I50.9 ACUTE DECOMPENSATED HEART FAILURE: ICD-10-CM

## 2024-01-01 DIAGNOSIS — I82.403 ACUTE DEEP VEIN THROMBOSIS (DVT) OF BOTH LOWER EXTREMITIES, UNSPECIFIED VEIN (MULTI): ICD-10-CM

## 2024-01-01 DIAGNOSIS — Z94.1 HEART TRANSPLANT RECIPIENT (MULTI): ICD-10-CM

## 2024-01-01 DIAGNOSIS — I27.82 OTHER CHRONIC PULMONARY EMBOLISM WITH ACUTE COR PULMONALE: ICD-10-CM

## 2024-01-01 DIAGNOSIS — R41.82 ALTERED MENTAL STATUS, UNSPECIFIED ALTERED MENTAL STATUS TYPE: ICD-10-CM

## 2024-01-01 DIAGNOSIS — R93.1 ABNORMAL FINDINGS ON DIAGNOSTIC IMAGING OF HEART AND CORONARY CIRCULATION: ICD-10-CM

## 2024-01-01 DIAGNOSIS — L93.0 LUPUS ERYTHEMATOSUS, UNSPECIFIED FORM: Primary | ICD-10-CM

## 2024-01-01 DIAGNOSIS — Z95.818 PRESENCE OF CARDIOMEMS HF SYSTEM: Primary | ICD-10-CM

## 2024-01-01 DIAGNOSIS — Z94.1 HEART TRANSPLANT RECIPIENT: ICD-10-CM

## 2024-01-01 DIAGNOSIS — Z94.1 S/P ORTHOTOPIC HEART TRANSPLANT: ICD-10-CM

## 2024-01-01 DIAGNOSIS — R60.1 GENERALIZED EDEMA: ICD-10-CM

## 2024-01-01 DIAGNOSIS — Z01.810 ENCOUNTER FOR PREPROCEDURAL CARDIOVASCULAR EXAMINATION: ICD-10-CM

## 2024-01-01 DIAGNOSIS — I82.623: ICD-10-CM

## 2024-01-01 DIAGNOSIS — N17.9 AKI (ACUTE KIDNEY INJURY) (CMS-HCC): ICD-10-CM

## 2024-01-01 DIAGNOSIS — Z01.818 PRE-TRANSPLANT EVALUATION FOR HEART TRANSPLANT: ICD-10-CM

## 2024-01-01 DIAGNOSIS — I42.9: ICD-10-CM

## 2024-01-01 DIAGNOSIS — T86.21 CARDIAC TRANSPLANT REJECTION: ICD-10-CM

## 2024-01-01 DIAGNOSIS — I82.A13: ICD-10-CM

## 2024-01-01 DIAGNOSIS — Z48.21 ENCOUNTER FOR AFTERCARE FOLLOWING HEART TRANSPLANT: ICD-10-CM

## 2024-01-01 DIAGNOSIS — I50.21 ACUTE SYSTOLIC (CONGESTIVE) HEART FAILURE: ICD-10-CM

## 2024-01-01 LAB
ABO GROUP (TYPE) IN BLOOD: NORMAL
ACANTHOCYTES BLD QL SMEAR: ABNORMAL
ACANTHOCYTES BLD QL SMEAR: NORMAL
ACT BLD: 113 SEC (ref 89–169)
ACT BLD: 137 SEC (ref 82–174)
ACT BLD: 144 SEC (ref 89–169)
ACT BLD: 146 SEC (ref 89–169)
ACT BLD: 150 SEC (ref 89–169)
ACT BLD: 152 SEC (ref 89–169)
ACT BLD: 160 SEC (ref 89–169)
ACT BLD: 161 SEC (ref 89–169)
ACT BLD: 166 SEC (ref 89–169)
ACT BLD: 166 SEC (ref 89–169)
ACT BLD: 167 SEC (ref 89–169)
ACT BLD: 169 SEC (ref 89–169)
ACT BLD: 170 SEC (ref 89–169)
ACT BLD: 170 SEC (ref 89–169)
ACT BLD: 171 SEC (ref 83–199)
ACT BLD: 172 SEC (ref 89–169)
ACT BLD: 174 SEC (ref 89–169)
ACT BLD: 176 SEC (ref 89–169)
ACT BLD: 178 SEC (ref 89–169)
ACT BLD: 179 SEC (ref 89–169)
ACT BLD: 180 SEC (ref 89–169)
ACT BLD: 187 SEC (ref 89–169)
ACT BLD: 205 SEC (ref 83–199)
ACT BLD: 261 SEC (ref 89–169)
ACT BLD: 352 SEC (ref 89–169)
ACT BLD: 369 SEC (ref 82–174)
ACT BLD: 85 SEC (ref 96–152)
ADENOVIRUS QPCR, VIRC: NOT DETECTED COPIES/ML
ADENOVIRUS RVP, VIRC: NOT DETECTED
ADENOVIRUS RVP, VIRC: NOT DETECTED
ALBUMIN SERPL BCP-MCNC: 2.5 G/DL (ref 3.4–5)
ALBUMIN SERPL BCP-MCNC: 2.7 G/DL (ref 3.4–5)
ALBUMIN SERPL BCP-MCNC: 2.8 G/DL (ref 3.4–5)
ALBUMIN SERPL BCP-MCNC: 2.9 G/DL (ref 3.4–5)
ALBUMIN SERPL BCP-MCNC: 3 G/DL (ref 3.4–5)
ALBUMIN SERPL BCP-MCNC: 3.1 G/DL (ref 3.4–5)
ALBUMIN SERPL BCP-MCNC: 3.2 G/DL (ref 3.4–5)
ALBUMIN SERPL BCP-MCNC: 3.3 G/DL (ref 3.4–5)
ALBUMIN SERPL BCP-MCNC: 3.4 G/DL (ref 3.4–5)
ALBUMIN SERPL BCP-MCNC: 3.5 G/DL (ref 3.4–5)
ALBUMIN SERPL BCP-MCNC: 3.6 G/DL (ref 3.4–5)
ALBUMIN SERPL BCP-MCNC: 3.7 G/DL (ref 3.4–5)
ALBUMIN SERPL BCP-MCNC: 3.8 G/DL (ref 3.4–5)
ALBUMIN SERPL BCP-MCNC: 3.9 G/DL (ref 3.4–5)
ALBUMIN SERPL BCP-MCNC: 4 G/DL (ref 3.4–5)
ALBUMIN SERPL BCP-MCNC: 4.1 G/DL (ref 3.4–5)
ALBUMIN SERPL BCP-MCNC: 4.2 G/DL (ref 3.4–5)
ALBUMIN SERPL BCP-MCNC: 4.3 G/DL (ref 3.4–5)
ALBUMIN SERPL BCP-MCNC: 4.4 G/DL (ref 3.4–5)
ALBUMIN SERPL BCP-MCNC: 4.5 G/DL (ref 3.4–5)
ALBUMIN SERPL BCP-MCNC: 4.6 G/DL (ref 3.4–5)
ALBUMIN SERPL BCP-MCNC: 4.7 G/DL (ref 3.4–5)
ALBUMIN SERPL BCP-MCNC: 4.7 G/DL (ref 3.4–5)
ALBUMIN SERPL BCP-MCNC: 4.8 G/DL (ref 3.4–5)
ALBUMIN SERPL BCP-MCNC: 5.2 G/DL (ref 3.4–5)
ALP SERPL-CCNC: 102 U/L (ref 33–110)
ALP SERPL-CCNC: 106 U/L (ref 33–110)
ALP SERPL-CCNC: 108 U/L (ref 33–110)
ALP SERPL-CCNC: 116 U/L (ref 33–110)
ALP SERPL-CCNC: 133 U/L (ref 33–110)
ALP SERPL-CCNC: 133 U/L (ref 33–110)
ALP SERPL-CCNC: 140 U/L (ref 33–110)
ALP SERPL-CCNC: 149 U/L (ref 33–110)
ALP SERPL-CCNC: 150 U/L (ref 33–110)
ALP SERPL-CCNC: 151 U/L (ref 33–110)
ALP SERPL-CCNC: 155 U/L (ref 33–110)
ALP SERPL-CCNC: 157 U/L (ref 33–110)
ALP SERPL-CCNC: 160 U/L (ref 33–110)
ALP SERPL-CCNC: 162 U/L (ref 33–110)
ALP SERPL-CCNC: 164 U/L (ref 33–110)
ALP SERPL-CCNC: 165 U/L (ref 33–110)
ALP SERPL-CCNC: 166 U/L (ref 33–110)
ALP SERPL-CCNC: 166 U/L (ref 33–110)
ALP SERPL-CCNC: 177 U/L (ref 33–110)
ALP SERPL-CCNC: 180 U/L (ref 33–110)
ALP SERPL-CCNC: 184 U/L (ref 33–110)
ALP SERPL-CCNC: 185 U/L (ref 33–110)
ALP SERPL-CCNC: 197 U/L (ref 33–110)
ALP SERPL-CCNC: 197 U/L (ref 33–110)
ALP SERPL-CCNC: 201 U/L (ref 33–110)
ALP SERPL-CCNC: 224 U/L (ref 33–110)
ALP SERPL-CCNC: 239 U/L (ref 33–110)
ALP SERPL-CCNC: 252 U/L (ref 33–110)
ALP SERPL-CCNC: 259 U/L (ref 33–110)
ALP SERPL-CCNC: 261 U/L (ref 33–110)
ALP SERPL-CCNC: 262 U/L (ref 33–110)
ALP SERPL-CCNC: 270 U/L (ref 33–110)
ALP SERPL-CCNC: 275 U/L (ref 33–110)
ALP SERPL-CCNC: 277 U/L (ref 33–110)
ALP SERPL-CCNC: 280 U/L (ref 33–110)
ALP SERPL-CCNC: 286 U/L (ref 33–110)
ALP SERPL-CCNC: 294 U/L (ref 33–110)
ALP SERPL-CCNC: 295 U/L (ref 33–110)
ALP SERPL-CCNC: 308 U/L (ref 33–110)
ALP SERPL-CCNC: 311 U/L (ref 33–110)
ALP SERPL-CCNC: 314 U/L (ref 33–110)
ALP SERPL-CCNC: 315 U/L (ref 33–110)
ALP SERPL-CCNC: 324 U/L (ref 33–110)
ALP SERPL-CCNC: 328 U/L (ref 33–110)
ALP SERPL-CCNC: 340 U/L (ref 33–110)
ALP SERPL-CCNC: 35 U/L (ref 33–110)
ALP SERPL-CCNC: 37 U/L (ref 33–110)
ALP SERPL-CCNC: 37 U/L (ref 33–110)
ALP SERPL-CCNC: 40 U/L (ref 33–110)
ALP SERPL-CCNC: 41 U/L (ref 33–110)
ALP SERPL-CCNC: 44 U/L (ref 33–110)
ALP SERPL-CCNC: 45 U/L (ref 33–110)
ALP SERPL-CCNC: 45 U/L (ref 33–110)
ALP SERPL-CCNC: 46 U/L (ref 33–110)
ALP SERPL-CCNC: 47 U/L (ref 33–110)
ALP SERPL-CCNC: 47 U/L (ref 33–110)
ALP SERPL-CCNC: 49 U/L (ref 33–110)
ALP SERPL-CCNC: 50 U/L (ref 33–110)
ALP SERPL-CCNC: 51 U/L (ref 33–110)
ALP SERPL-CCNC: 52 U/L (ref 33–110)
ALP SERPL-CCNC: 53 U/L (ref 33–110)
ALP SERPL-CCNC: 53 U/L (ref 33–110)
ALP SERPL-CCNC: 55 U/L (ref 33–110)
ALP SERPL-CCNC: 57 U/L (ref 33–110)
ALP SERPL-CCNC: 57 U/L (ref 33–110)
ALP SERPL-CCNC: 59 U/L (ref 33–110)
ALP SERPL-CCNC: 59 U/L (ref 33–110)
ALP SERPL-CCNC: 60 U/L (ref 33–110)
ALP SERPL-CCNC: 61 U/L (ref 33–110)
ALP SERPL-CCNC: 62 U/L (ref 33–110)
ALP SERPL-CCNC: 64 U/L (ref 33–110)
ALP SERPL-CCNC: 65 U/L (ref 33–110)
ALP SERPL-CCNC: 68 U/L (ref 33–110)
ALP SERPL-CCNC: 69 U/L (ref 33–110)
ALP SERPL-CCNC: 80 U/L (ref 33–110)
ALP SERPL-CCNC: 84 U/L (ref 33–110)
ALP SERPL-CCNC: 90 U/L (ref 33–110)
ALT SERPL W P-5'-P-CCNC: 100 U/L (ref 7–45)
ALT SERPL W P-5'-P-CCNC: 100 U/L (ref 7–45)
ALT SERPL W P-5'-P-CCNC: 105 U/L (ref 7–45)
ALT SERPL W P-5'-P-CCNC: 105 U/L (ref 7–45)
ALT SERPL W P-5'-P-CCNC: 106 U/L (ref 7–45)
ALT SERPL W P-5'-P-CCNC: 111 U/L (ref 7–45)
ALT SERPL W P-5'-P-CCNC: 12 U/L (ref 7–45)
ALT SERPL W P-5'-P-CCNC: 15 U/L (ref 7–45)
ALT SERPL W P-5'-P-CCNC: 157 U/L (ref 7–45)
ALT SERPL W P-5'-P-CCNC: 16 U/L (ref 7–45)
ALT SERPL W P-5'-P-CCNC: 16 U/L (ref 7–45)
ALT SERPL W P-5'-P-CCNC: 17 U/L (ref 7–45)
ALT SERPL W P-5'-P-CCNC: 17 U/L (ref 7–45)
ALT SERPL W P-5'-P-CCNC: 18 U/L (ref 7–45)
ALT SERPL W P-5'-P-CCNC: 18 U/L (ref 7–45)
ALT SERPL W P-5'-P-CCNC: 180 U/L (ref 7–45)
ALT SERPL W P-5'-P-CCNC: 186 U/L (ref 7–45)
ALT SERPL W P-5'-P-CCNC: 19 U/L (ref 7–45)
ALT SERPL W P-5'-P-CCNC: 198 U/L (ref 7–45)
ALT SERPL W P-5'-P-CCNC: 21 U/L (ref 7–45)
ALT SERPL W P-5'-P-CCNC: 22 U/L (ref 7–45)
ALT SERPL W P-5'-P-CCNC: 220 U/L (ref 7–45)
ALT SERPL W P-5'-P-CCNC: 24 U/L (ref 7–45)
ALT SERPL W P-5'-P-CCNC: 24 U/L (ref 7–45)
ALT SERPL W P-5'-P-CCNC: 243 U/L (ref 7–45)
ALT SERPL W P-5'-P-CCNC: 252 U/L (ref 7–45)
ALT SERPL W P-5'-P-CCNC: 256 U/L (ref 7–45)
ALT SERPL W P-5'-P-CCNC: 26 U/L (ref 7–45)
ALT SERPL W P-5'-P-CCNC: 262 U/L (ref 7–45)
ALT SERPL W P-5'-P-CCNC: 264 U/L (ref 7–45)
ALT SERPL W P-5'-P-CCNC: 264 U/L (ref 7–45)
ALT SERPL W P-5'-P-CCNC: 27 U/L (ref 7–45)
ALT SERPL W P-5'-P-CCNC: 27 U/L (ref 7–45)
ALT SERPL W P-5'-P-CCNC: 275 U/L (ref 7–45)
ALT SERPL W P-5'-P-CCNC: 275 U/L (ref 7–45)
ALT SERPL W P-5'-P-CCNC: 279 U/L (ref 7–45)
ALT SERPL W P-5'-P-CCNC: 283 U/L (ref 7–45)
ALT SERPL W P-5'-P-CCNC: 29 U/L (ref 7–45)
ALT SERPL W P-5'-P-CCNC: 31 U/L (ref 7–45)
ALT SERPL W P-5'-P-CCNC: 311 U/L (ref 7–45)
ALT SERPL W P-5'-P-CCNC: 316 U/L (ref 7–45)
ALT SERPL W P-5'-P-CCNC: 318 U/L (ref 7–45)
ALT SERPL W P-5'-P-CCNC: 33 U/L (ref 7–45)
ALT SERPL W P-5'-P-CCNC: 34 U/L (ref 7–45)
ALT SERPL W P-5'-P-CCNC: 39 U/L (ref 7–45)
ALT SERPL W P-5'-P-CCNC: 39 U/L (ref 7–45)
ALT SERPL W P-5'-P-CCNC: 41 U/L (ref 7–45)
ALT SERPL W P-5'-P-CCNC: 46 U/L (ref 7–45)
ALT SERPL W P-5'-P-CCNC: 47 U/L (ref 7–45)
ALT SERPL W P-5'-P-CCNC: 5 U/L (ref 7–45)
ALT SERPL W P-5'-P-CCNC: 5 U/L (ref 7–45)
ALT SERPL W P-5'-P-CCNC: 51 U/L (ref 7–45)
ALT SERPL W P-5'-P-CCNC: 52 U/L (ref 7–45)
ALT SERPL W P-5'-P-CCNC: 52 U/L (ref 7–45)
ALT SERPL W P-5'-P-CCNC: 53 U/L (ref 7–45)
ALT SERPL W P-5'-P-CCNC: 543 U/L (ref 7–45)
ALT SERPL W P-5'-P-CCNC: 59 U/L (ref 7–45)
ALT SERPL W P-5'-P-CCNC: 6 U/L (ref 7–45)
ALT SERPL W P-5'-P-CCNC: 60 U/L (ref 7–45)
ALT SERPL W P-5'-P-CCNC: 625 U/L (ref 7–45)
ALT SERPL W P-5'-P-CCNC: 636 U/L (ref 7–45)
ALT SERPL W P-5'-P-CCNC: 68 U/L (ref 7–45)
ALT SERPL W P-5'-P-CCNC: 682 U/L (ref 7–45)
ALT SERPL W P-5'-P-CCNC: 7 U/L (ref 7–45)
ALT SERPL W P-5'-P-CCNC: 70 U/L (ref 7–45)
ALT SERPL W P-5'-P-CCNC: 71 U/L (ref 7–45)
ALT SERPL W P-5'-P-CCNC: 728 U/L (ref 7–45)
ALT SERPL W P-5'-P-CCNC: 73 U/L (ref 7–45)
ALT SERPL W P-5'-P-CCNC: 771 U/L (ref 7–45)
ALT SERPL W P-5'-P-CCNC: 79 U/L (ref 7–45)
ALT SERPL W P-5'-P-CCNC: 8 U/L (ref 7–45)
ALT SERPL W P-5'-P-CCNC: 8 U/L (ref 7–45)
ALT SERPL W P-5'-P-CCNC: 80 U/L (ref 7–45)
ALT SERPL W P-5'-P-CCNC: 82 U/L (ref 7–45)
ALT SERPL W P-5'-P-CCNC: 83 U/L (ref 7–45)
ALT SERPL W P-5'-P-CCNC: 84 U/L (ref 7–45)
ALT SERPL W P-5'-P-CCNC: 85 U/L (ref 7–45)
ALT SERPL W P-5'-P-CCNC: 86 U/L (ref 7–45)
ALT SERPL W P-5'-P-CCNC: 9 U/L (ref 7–45)
AMMONIA PLAS-SCNC: 28 UMOL/L (ref 16–53)
AMORPH CRY #/AREA UR COMP ASSIST: ABNORMAL /HPF
AMYLASE SERPL-CCNC: 35 U/L (ref 29–103)
ANION GAP BLDA CALCULATED.4IONS-SCNC: 10 MMO/L
ANION GAP BLDA CALCULATED.4IONS-SCNC: 10 MMO/L
ANION GAP BLDA CALCULATED.4IONS-SCNC: 10 MMO/L (ref 10–25)
ANION GAP BLDA CALCULATED.4IONS-SCNC: 11 MMO/L
ANION GAP BLDA CALCULATED.4IONS-SCNC: 11 MMO/L (ref 10–25)
ANION GAP BLDA CALCULATED.4IONS-SCNC: 12 MMO/L
ANION GAP BLDA CALCULATED.4IONS-SCNC: 12 MMO/L
ANION GAP BLDA CALCULATED.4IONS-SCNC: 12 MMO/L (ref 10–25)
ANION GAP BLDA CALCULATED.4IONS-SCNC: 13 MMO/L
ANION GAP BLDA CALCULATED.4IONS-SCNC: 13 MMO/L
ANION GAP BLDA CALCULATED.4IONS-SCNC: 13 MMO/L (ref 10–25)
ANION GAP BLDA CALCULATED.4IONS-SCNC: 14 MMO/L (ref 10–25)
ANION GAP BLDA CALCULATED.4IONS-SCNC: 15 MMO/L (ref 10–25)
ANION GAP BLDA CALCULATED.4IONS-SCNC: 16 MMO/L
ANION GAP BLDA CALCULATED.4IONS-SCNC: 16 MMO/L (ref 10–25)
ANION GAP BLDA CALCULATED.4IONS-SCNC: 17 MMO/L
ANION GAP BLDA CALCULATED.4IONS-SCNC: 17 MMO/L (ref 10–25)
ANION GAP BLDA CALCULATED.4IONS-SCNC: 18 MMO/L (ref 10–25)
ANION GAP BLDA CALCULATED.4IONS-SCNC: 20 MMO/L (ref 10–25)
ANION GAP BLDA CALCULATED.4IONS-SCNC: 22 MMO/L (ref 10–25)
ANION GAP BLDA CALCULATED.4IONS-SCNC: 5 MMO/L (ref 10–25)
ANION GAP BLDA CALCULATED.4IONS-SCNC: 5 MMO/L (ref 10–25)
ANION GAP BLDA CALCULATED.4IONS-SCNC: 6 MMO/L (ref 10–25)
ANION GAP BLDA CALCULATED.4IONS-SCNC: 7 MMO/L (ref 10–25)
ANION GAP BLDA CALCULATED.4IONS-SCNC: 8 MMO/L (ref 10–25)
ANION GAP BLDA CALCULATED.4IONS-SCNC: 9 MMO/L
ANION GAP BLDA CALCULATED.4IONS-SCNC: 9 MMO/L (ref 10–25)
ANION GAP BLDA CALCULATED.4IONS-SCNC: ABNORMAL MMOL/L
ANION GAP BLDMV CALCULATED.4IONS-SCNC: 10 MMO/L (ref 10–25)
ANION GAP BLDMV CALCULATED.4IONS-SCNC: 10 MMO/L (ref 10–25)
ANION GAP BLDMV CALCULATED.4IONS-SCNC: 11 MMO/L (ref 10–25)
ANION GAP BLDMV CALCULATED.4IONS-SCNC: 12 MMO/L (ref 10–25)
ANION GAP BLDMV CALCULATED.4IONS-SCNC: 13 MMO/L (ref 10–25)
ANION GAP BLDMV CALCULATED.4IONS-SCNC: 14 MMO/L (ref 10–25)
ANION GAP BLDMV CALCULATED.4IONS-SCNC: 15 MMO/L (ref 10–25)
ANION GAP BLDMV CALCULATED.4IONS-SCNC: 16 MMO/L (ref 10–25)
ANION GAP BLDMV CALCULATED.4IONS-SCNC: 17 MMO/L (ref 10–25)
ANION GAP BLDMV CALCULATED.4IONS-SCNC: 18 MMO/L (ref 10–25)
ANION GAP BLDMV CALCULATED.4IONS-SCNC: 18 MMO/L (ref 10–25)
ANION GAP BLDMV CALCULATED.4IONS-SCNC: 8 MMO/L (ref 10–25)
ANION GAP BLDMV CALCULATED.4IONS-SCNC: 8 MMO/L (ref 10–25)
ANION GAP BLDMV CALCULATED.4IONS-SCNC: 9 MMO/L (ref 10–25)
ANION GAP BLDV CALCULATED.4IONS-SCNC: 10 MMO/L
ANION GAP BLDV CALCULATED.4IONS-SCNC: 11 MMO/L
ANION GAP BLDV CALCULATED.4IONS-SCNC: 11 MMOL/L (ref 10–25)
ANION GAP BLDV CALCULATED.4IONS-SCNC: 11 MMOL/L (ref 10–25)
ANION GAP BLDV CALCULATED.4IONS-SCNC: 12 MMO/L
ANION GAP BLDV CALCULATED.4IONS-SCNC: 12 MMOL/L (ref 10–25)
ANION GAP BLDV CALCULATED.4IONS-SCNC: 13 MMO/L
ANION GAP BLDV CALCULATED.4IONS-SCNC: 14 MMOL/L (ref 10–25)
ANION GAP BLDV CALCULATED.4IONS-SCNC: 15 MMO/L
ANION GAP BLDV CALCULATED.4IONS-SCNC: 15 MMOL/L (ref 10–25)
ANION GAP BLDV CALCULATED.4IONS-SCNC: 16 MMO/L
ANION GAP BLDV CALCULATED.4IONS-SCNC: 16 MMOL/L (ref 10–25)
ANION GAP BLDV CALCULATED.4IONS-SCNC: 17 MMO/L
ANION GAP BLDV CALCULATED.4IONS-SCNC: 17 MMOL/L (ref 10–25)
ANION GAP BLDV CALCULATED.4IONS-SCNC: 17 MMOL/L (ref 10–25)
ANION GAP BLDV CALCULATED.4IONS-SCNC: 21 MMOL/L (ref 10–25)
ANION GAP BLDV CALCULATED.4IONS-SCNC: 8 MMO/L
ANION GAP BLDV CALCULATED.4IONS-SCNC: 8 MMO/L
ANION GAP BLDV CALCULATED.4IONS-SCNC: 9 MMO/L
ANION GAP BLDV CALCULATED.4IONS-SCNC: 9 MMO/L
ANION GAP BLDV CALCULATED.4IONS-SCNC: ABNORMAL MMOL/L
ANION GAP SERPL CALC-SCNC: 11 MMOL/L (ref 10–20)
ANION GAP SERPL CALC-SCNC: 12 MMOL/L (ref 10–20)
ANION GAP SERPL CALC-SCNC: 13 MMOL/L (ref 10–20)
ANION GAP SERPL CALC-SCNC: 14 MMOL/L
ANION GAP SERPL CALC-SCNC: 14 MMOL/L (ref 10–20)
ANION GAP SERPL CALC-SCNC: 15 MMOL/L (ref 10–20)
ANION GAP SERPL CALC-SCNC: 16 MMOL/L
ANION GAP SERPL CALC-SCNC: 16 MMOL/L (ref 10–20)
ANION GAP SERPL CALC-SCNC: 17 MMOL/L (ref 10–20)
ANION GAP SERPL CALC-SCNC: 18 MMOL/L
ANION GAP SERPL CALC-SCNC: 18 MMOL/L (ref 10–20)
ANION GAP SERPL CALC-SCNC: 19 MMOL/L (ref 10–20)
ANION GAP SERPL CALC-SCNC: 20 MMOL/L (ref 10–20)
ANION GAP SERPL CALC-SCNC: 21 MMOL/L (ref 10–20)
ANION GAP SERPL CALC-SCNC: 22 MMOL/L (ref 10–20)
ANION GAP SERPL CALC-SCNC: 23 MMOL/L (ref 10–20)
ANION GAP SERPL CALC-SCNC: 24 MMOL/L (ref 10–20)
ANION GAP SERPL CALC-SCNC: 24 MMOL/L (ref 10–20)
ANTIBODY SCREEN: NORMAL
AORTIC VALVE PEAK VELOCITY: 1.75 M/S
APPEARANCE UR: ABNORMAL
APPEARANCE UR: CLEAR
APTT PPP: 133 SECONDS (ref 27–38)
APTT PPP: 135 SECONDS (ref 27–38)
APTT PPP: 20 SECONDS (ref 27–38)
APTT PPP: 23 SECONDS (ref 27–38)
APTT PPP: 24 SECONDS (ref 27–38)
APTT PPP: 25 SECONDS (ref 27–38)
APTT PPP: 26 SECONDS (ref 27–38)
APTT PPP: 26 SECONDS (ref 27–38)
APTT PPP: 27 SECONDS (ref 27–38)
APTT PPP: 28 SECONDS (ref 27–38)
APTT PPP: 28 SECONDS (ref 27–38)
APTT PPP: 29 SECONDS (ref 27–38)
APTT PPP: 30 SECONDS (ref 27–38)
APTT PPP: 30 SECONDS (ref 27–38)
APTT PPP: 31 SECONDS (ref 27–38)
APTT PPP: 32 SECONDS (ref 27–38)
APTT PPP: 34 SECONDS (ref 27–38)
APTT PPP: 35 SECONDS (ref 27–38)
APTT PPP: 35 SECONDS (ref 27–38)
APTT PPP: 38 SECONDS (ref 27–38)
APTT PPP: 39 SECONDS (ref 27–38)
APTT PPP: 40 SECONDS (ref 27–38)
APTT PPP: 41 SECONDS (ref 27–38)
APTT PPP: 43 SECONDS (ref 27–38)
APTT PPP: 43 SECONDS (ref 27–38)
APTT PPP: 44 SECONDS (ref 27–38)
APTT PPP: 45 SECONDS (ref 27–38)
APTT PPP: 47 SECONDS (ref 27–38)
APTT PPP: 48 SECONDS (ref 27–38)
APTT PPP: 49 SECONDS (ref 27–38)
APTT PPP: 51 SECONDS (ref 27–38)
APTT PPP: 52 SECONDS (ref 27–38)
APTT PPP: 53 SECONDS (ref 27–38)
APTT PPP: 53 SECONDS (ref 27–38)
APTT PPP: 55 SECONDS (ref 27–38)
APTT PPP: 56 SECONDS (ref 27–38)
APTT PPP: 58 SECONDS (ref 27–38)
APTT PPP: 61 SECONDS (ref 27–38)
APTT PPP: 62 SECONDS (ref 27–38)
APTT PPP: 65 SECONDS (ref 27–38)
APTT PPP: 71 SECONDS (ref 27–38)
APTT PPP: 71 SECONDS (ref 27–38)
APTT PPP: 87 SECONDS (ref 27–38)
APTT PPP: 91 SECONDS (ref 27–38)
APTT PPP: 92 SECONDS (ref 27–38)
APTT PPP: 93 SECONDS (ref 27–38)
APTT PPP: 95 SECONDS (ref 27–38)
APTT PPP: >200 SECONDS (ref 27–38)
ASPERGILLUS GALACTOMANNAN EIA (NON-BLOOD SPECIMEN): 0.23
ASPERGILLUS GALACTOMANNAN EIA (NON-BLOOD SPECIMEN): 6.1
ASPERGILLUS GALACTOMANNAN EIA,SERUM: 0.09
AST SERPL W P-5'-P-CCNC: 10 U/L (ref 9–39)
AST SERPL W P-5'-P-CCNC: 10 U/L (ref 9–39)
AST SERPL W P-5'-P-CCNC: 101 U/L (ref 9–39)
AST SERPL W P-5'-P-CCNC: 102 U/L (ref 9–39)
AST SERPL W P-5'-P-CCNC: 103 U/L (ref 9–39)
AST SERPL W P-5'-P-CCNC: 107 U/L (ref 9–39)
AST SERPL W P-5'-P-CCNC: 1079 U/L (ref 9–39)
AST SERPL W P-5'-P-CCNC: 1082 U/L (ref 9–39)
AST SERPL W P-5'-P-CCNC: 109 U/L (ref 9–39)
AST SERPL W P-5'-P-CCNC: 112 U/L (ref 9–39)
AST SERPL W P-5'-P-CCNC: 114 U/L (ref 9–39)
AST SERPL W P-5'-P-CCNC: 125 U/L (ref 9–39)
AST SERPL W P-5'-P-CCNC: 129 U/L (ref 9–39)
AST SERPL W P-5'-P-CCNC: 13 U/L (ref 9–39)
AST SERPL W P-5'-P-CCNC: 144 U/L (ref 9–39)
AST SERPL W P-5'-P-CCNC: 16 U/L (ref 9–39)
AST SERPL W P-5'-P-CCNC: 175 U/L (ref 9–39)
AST SERPL W P-5'-P-CCNC: 182 U/L (ref 9–39)
AST SERPL W P-5'-P-CCNC: 192 U/L (ref 9–39)
AST SERPL W P-5'-P-CCNC: 196 U/L (ref 9–39)
AST SERPL W P-5'-P-CCNC: 204 U/L (ref 9–39)
AST SERPL W P-5'-P-CCNC: 205 U/L (ref 9–39)
AST SERPL W P-5'-P-CCNC: 213 U/L (ref 9–39)
AST SERPL W P-5'-P-CCNC: 214 U/L (ref 9–39)
AST SERPL W P-5'-P-CCNC: 215 U/L (ref 9–39)
AST SERPL W P-5'-P-CCNC: 220 U/L (ref 9–39)
AST SERPL W P-5'-P-CCNC: 222 U/L (ref 9–39)
AST SERPL W P-5'-P-CCNC: 24 U/L (ref 9–39)
AST SERPL W P-5'-P-CCNC: 24 U/L (ref 9–39)
AST SERPL W P-5'-P-CCNC: 245 U/L (ref 9–39)
AST SERPL W P-5'-P-CCNC: 259 U/L (ref 9–39)
AST SERPL W P-5'-P-CCNC: 272 U/L (ref 9–39)
AST SERPL W P-5'-P-CCNC: 276 U/L (ref 9–39)
AST SERPL W P-5'-P-CCNC: 29 U/L (ref 9–39)
AST SERPL W P-5'-P-CCNC: 295 U/L (ref 9–39)
AST SERPL W P-5'-P-CCNC: 31 U/L (ref 9–39)
AST SERPL W P-5'-P-CCNC: 319 U/L (ref 9–39)
AST SERPL W P-5'-P-CCNC: 32 U/L (ref 9–39)
AST SERPL W P-5'-P-CCNC: 32 U/L (ref 9–39)
AST SERPL W P-5'-P-CCNC: 323 U/L (ref 9–39)
AST SERPL W P-5'-P-CCNC: 33 U/L (ref 9–39)
AST SERPL W P-5'-P-CCNC: 340 U/L (ref 9–39)
AST SERPL W P-5'-P-CCNC: 343 U/L (ref 9–39)
AST SERPL W P-5'-P-CCNC: 35 U/L (ref 9–39)
AST SERPL W P-5'-P-CCNC: 353 U/L (ref 9–39)
AST SERPL W P-5'-P-CCNC: 36 U/L (ref 9–39)
AST SERPL W P-5'-P-CCNC: 374 U/L (ref 9–39)
AST SERPL W P-5'-P-CCNC: 375 U/L (ref 9–39)
AST SERPL W P-5'-P-CCNC: 377 U/L (ref 9–39)
AST SERPL W P-5'-P-CCNC: 38 U/L (ref 9–39)
AST SERPL W P-5'-P-CCNC: 383 U/L (ref 9–39)
AST SERPL W P-5'-P-CCNC: 39 U/L (ref 9–39)
AST SERPL W P-5'-P-CCNC: 408 U/L (ref 9–39)
AST SERPL W P-5'-P-CCNC: 411 U/L (ref 9–39)
AST SERPL W P-5'-P-CCNC: 413 U/L (ref 9–39)
AST SERPL W P-5'-P-CCNC: 44 U/L (ref 9–39)
AST SERPL W P-5'-P-CCNC: 44 U/L (ref 9–39)
AST SERPL W P-5'-P-CCNC: 448 U/L (ref 9–39)
AST SERPL W P-5'-P-CCNC: 45 U/L (ref 9–39)
AST SERPL W P-5'-P-CCNC: 455 U/L (ref 9–39)
AST SERPL W P-5'-P-CCNC: 457 U/L (ref 9–39)
AST SERPL W P-5'-P-CCNC: 46 U/L (ref 9–39)
AST SERPL W P-5'-P-CCNC: 47 U/L (ref 9–39)
AST SERPL W P-5'-P-CCNC: 48 U/L (ref 9–39)
AST SERPL W P-5'-P-CCNC: 492 U/L (ref 9–39)
AST SERPL W P-5'-P-CCNC: 498 U/L (ref 9–39)
AST SERPL W P-5'-P-CCNC: 500 U/L (ref 9–39)
AST SERPL W P-5'-P-CCNC: 500 U/L (ref 9–39)
AST SERPL W P-5'-P-CCNC: 505 U/L (ref 9–39)
AST SERPL W P-5'-P-CCNC: 52 U/L (ref 9–39)
AST SERPL W P-5'-P-CCNC: 532 U/L (ref 9–39)
AST SERPL W P-5'-P-CCNC: 534 U/L (ref 9–39)
AST SERPL W P-5'-P-CCNC: 54 U/L (ref 9–39)
AST SERPL W P-5'-P-CCNC: 55 U/L (ref 9–39)
AST SERPL W P-5'-P-CCNC: 56 U/L (ref 9–39)
AST SERPL W P-5'-P-CCNC: 566 U/L (ref 9–39)
AST SERPL W P-5'-P-CCNC: 568 U/L (ref 9–39)
AST SERPL W P-5'-P-CCNC: 59 U/L (ref 9–39)
AST SERPL W P-5'-P-CCNC: 60 U/L (ref 9–39)
AST SERPL W P-5'-P-CCNC: 68 U/L (ref 9–39)
AST SERPL W P-5'-P-CCNC: 70 U/L (ref 9–39)
AST SERPL W P-5'-P-CCNC: 77 U/L (ref 9–39)
AST SERPL W P-5'-P-CCNC: 783 U/L (ref 9–39)
AST SERPL W P-5'-P-CCNC: 82 U/L (ref 9–39)
AST SERPL W P-5'-P-CCNC: 83 U/L (ref 9–39)
AST SERPL W P-5'-P-CCNC: 85 U/L (ref 9–39)
AST SERPL W P-5'-P-CCNC: 89 U/L (ref 9–39)
ATRIAL RATE: 108 BPM
ATRIAL RATE: 116 BPM
ATRIAL RATE: 124 BPM
ATRIAL RATE: 128 BPM
ATRIAL RATE: 131 BPM
ATRIAL RATE: 135 BPM
ATRIAL RATE: 149 BPM
ATRIAL RATE: 96 BPM
AV PEAK GRADIENT: 12.3 MMHG
B-HCG SERPL-ACNC: <3 MIU/ML
B-HCG SERPL-ACNC: <3 MIU/ML
BACTERIA #/AREA URNS AUTO: ABNORMAL /HPF
BACTERIA BLD CULT: NORMAL
BACTERIA SPEC CULT: NORMAL
BACTERIA SPEC RESP CULT: ABNORMAL
BACTERIA SPEC RESP CULT: NORMAL
BACTERIA UR CULT: ABNORMAL
BACTERIAL VAGINOSIS VAG-IMP: NORMAL
BASE EXCESS BLDA CALC-SCNC: -0.1 MMOL/L (ref -2–3)
BASE EXCESS BLDA CALC-SCNC: -0.2 MMOL/L (ref -2–3)
BASE EXCESS BLDA CALC-SCNC: -0.3 MMOL/L
BASE EXCESS BLDA CALC-SCNC: -0.3 MMOL/L (ref -2–3)
BASE EXCESS BLDA CALC-SCNC: -0.4 MMOL/L (ref -2–3)
BASE EXCESS BLDA CALC-SCNC: -0.5 MMOL/L (ref -2–3)
BASE EXCESS BLDA CALC-SCNC: -0.8 MMOL/L
BASE EXCESS BLDA CALC-SCNC: -0.8 MMOL/L (ref -2–3)
BASE EXCESS BLDA CALC-SCNC: -0.9 MMOL/L
BASE EXCESS BLDA CALC-SCNC: -0.9 MMOL/L (ref -2–3)
BASE EXCESS BLDA CALC-SCNC: -1 MMOL/L (ref -2–3)
BASE EXCESS BLDA CALC-SCNC: -1 MMOL/L (ref -2–3)
BASE EXCESS BLDA CALC-SCNC: -1.1 MMOL/L
BASE EXCESS BLDA CALC-SCNC: -1.2 MMOL/L (ref -2–3)
BASE EXCESS BLDA CALC-SCNC: -1.3 MMOL/L
BASE EXCESS BLDA CALC-SCNC: -1.3 MMOL/L (ref -2–3)
BASE EXCESS BLDA CALC-SCNC: -1.4 MMOL/L
BASE EXCESS BLDA CALC-SCNC: -1.4 MMOL/L (ref -2–3)
BASE EXCESS BLDA CALC-SCNC: -1.4 MMOL/L (ref -2–3)
BASE EXCESS BLDA CALC-SCNC: -1.5 MMOL/L (ref -2–3)
BASE EXCESS BLDA CALC-SCNC: -1.6 MMOL/L (ref -2–3)
BASE EXCESS BLDA CALC-SCNC: -1.7 MMOL/L
BASE EXCESS BLDA CALC-SCNC: -1.7 MMOL/L (ref -2–3)
BASE EXCESS BLDA CALC-SCNC: -1.8 MMOL/L
BASE EXCESS BLDA CALC-SCNC: -1.8 MMOL/L (ref -2–3)
BASE EXCESS BLDA CALC-SCNC: -1.8 MMOL/L (ref -2–3)
BASE EXCESS BLDA CALC-SCNC: -1.9 MMOL/L
BASE EXCESS BLDA CALC-SCNC: -1.9 MMOL/L (ref -2–3)
BASE EXCESS BLDA CALC-SCNC: -2 MMOL/L (ref -2–3)
BASE EXCESS BLDA CALC-SCNC: -2.1 MMOL/L (ref -2–3)
BASE EXCESS BLDA CALC-SCNC: -2.2 MMOL/L (ref -2–3)
BASE EXCESS BLDA CALC-SCNC: -2.4 MMOL/L
BASE EXCESS BLDA CALC-SCNC: -2.4 MMOL/L (ref -2–3)
BASE EXCESS BLDA CALC-SCNC: -2.4 MMOL/L (ref -2–3)
BASE EXCESS BLDA CALC-SCNC: -2.5 MMOL/L (ref -2–3)
BASE EXCESS BLDA CALC-SCNC: -2.6 MMOL/L
BASE EXCESS BLDA CALC-SCNC: -2.6 MMOL/L (ref -2–3)
BASE EXCESS BLDA CALC-SCNC: -2.7 MMOL/L (ref -2–3)
BASE EXCESS BLDA CALC-SCNC: -2.7 MMOL/L (ref -2–3)
BASE EXCESS BLDA CALC-SCNC: -2.8 MMOL/L
BASE EXCESS BLDA CALC-SCNC: -2.8 MMOL/L (ref -2–3)
BASE EXCESS BLDA CALC-SCNC: -2.8 MMOL/L (ref -2–3)
BASE EXCESS BLDA CALC-SCNC: -2.9 MMOL/L (ref -2–3)
BASE EXCESS BLDA CALC-SCNC: -3.1 MMOL/L (ref -2–3)
BASE EXCESS BLDA CALC-SCNC: -3.1 MMOL/L (ref -2–3)
BASE EXCESS BLDA CALC-SCNC: -3.2 MMOL/L
BASE EXCESS BLDA CALC-SCNC: -3.2 MMOL/L
BASE EXCESS BLDA CALC-SCNC: -3.2 MMOL/L (ref -2–3)
BASE EXCESS BLDA CALC-SCNC: -3.3 MMOL/L
BASE EXCESS BLDA CALC-SCNC: -3.3 MMOL/L
BASE EXCESS BLDA CALC-SCNC: -3.3 MMOL/L (ref -2–3)
BASE EXCESS BLDA CALC-SCNC: -3.3 MMOL/L (ref -2–3)
BASE EXCESS BLDA CALC-SCNC: -3.4 MMOL/L
BASE EXCESS BLDA CALC-SCNC: -3.4 MMOL/L (ref -2–3)
BASE EXCESS BLDA CALC-SCNC: -3.6 MMOL/L
BASE EXCESS BLDA CALC-SCNC: -3.8 MMOL/L (ref -2–3)
BASE EXCESS BLDA CALC-SCNC: -3.9 MMOL/L (ref -2–3)
BASE EXCESS BLDA CALC-SCNC: -4 MMOL/L (ref -2–3)
BASE EXCESS BLDA CALC-SCNC: -4.1 MMOL/L
BASE EXCESS BLDA CALC-SCNC: -4.1 MMOL/L (ref -2–3)
BASE EXCESS BLDA CALC-SCNC: -4.1 MMOL/L (ref -2–3)
BASE EXCESS BLDA CALC-SCNC: -4.4 MMOL/L (ref -2–3)
BASE EXCESS BLDA CALC-SCNC: -4.4 MMOL/L (ref -2–3)
BASE EXCESS BLDA CALC-SCNC: -4.5 MMOL/L (ref -2–3)
BASE EXCESS BLDA CALC-SCNC: -4.6 MMOL/L (ref -2–3)
BASE EXCESS BLDA CALC-SCNC: -4.8 MMOL/L (ref -2–3)
BASE EXCESS BLDA CALC-SCNC: -5 MMOL/L (ref -2–3)
BASE EXCESS BLDA CALC-SCNC: -5.1 MMOL/L (ref -2–3)
BASE EXCESS BLDA CALC-SCNC: -5.9 MMOL/L (ref -2–3)
BASE EXCESS BLDA CALC-SCNC: -6.1 MMOL/L (ref -2–3)
BASE EXCESS BLDA CALC-SCNC: -6.3 MMOL/L
BASE EXCESS BLDA CALC-SCNC: -6.6 MMOL/L (ref -2–3)
BASE EXCESS BLDA CALC-SCNC: -6.6 MMOL/L (ref -2–3)
BASE EXCESS BLDA CALC-SCNC: -6.7 MMOL/L (ref -2–3)
BASE EXCESS BLDA CALC-SCNC: -6.7 MMOL/L (ref -2–3)
BASE EXCESS BLDA CALC-SCNC: -7.3 MMOL/L (ref -2–3)
BASE EXCESS BLDA CALC-SCNC: -7.6 MMOL/L (ref -2–3)
BASE EXCESS BLDA CALC-SCNC: -7.6 MMOL/L (ref -2–3)
BASE EXCESS BLDA CALC-SCNC: -7.7 MMOL/L (ref -2–3)
BASE EXCESS BLDA CALC-SCNC: -7.7 MMOL/L (ref -2–3)
BASE EXCESS BLDA CALC-SCNC: -7.8 MMOL/L (ref -2–3)
BASE EXCESS BLDA CALC-SCNC: -7.8 MMOL/L (ref -2–3)
BASE EXCESS BLDA CALC-SCNC: -8.3 MMOL/L (ref -2–3)
BASE EXCESS BLDA CALC-SCNC: -8.4 MMOL/L (ref -2–3)
BASE EXCESS BLDA CALC-SCNC: -8.6 MMOL/L
BASE EXCESS BLDA CALC-SCNC: -8.7 MMOL/L (ref -2–3)
BASE EXCESS BLDA CALC-SCNC: -9 MMOL/L (ref -2–3)
BASE EXCESS BLDA CALC-SCNC: 0 MMOL/L (ref -2–3)
BASE EXCESS BLDA CALC-SCNC: 0 MMOL/L (ref -2–3)
BASE EXCESS BLDA CALC-SCNC: 0.3 MMOL/L
BASE EXCESS BLDA CALC-SCNC: 0.3 MMOL/L
BASE EXCESS BLDA CALC-SCNC: 0.3 MMOL/L (ref -2–3)
BASE EXCESS BLDA CALC-SCNC: 0.3 MMOL/L (ref -2–3)
BASE EXCESS BLDA CALC-SCNC: 0.4 MMOL/L (ref -2–3)
BASE EXCESS BLDA CALC-SCNC: 0.6 MMOL/L
BASE EXCESS BLDA CALC-SCNC: 0.6 MMOL/L (ref -2–3)
BASE EXCESS BLDA CALC-SCNC: 0.7 MMOL/L (ref -2–3)
BASE EXCESS BLDA CALC-SCNC: 0.9 MMOL/L
BASE EXCESS BLDA CALC-SCNC: 0.9 MMOL/L
BASE EXCESS BLDA CALC-SCNC: 0.9 MMOL/L (ref -2–3)
BASE EXCESS BLDA CALC-SCNC: 1 MMOL/L
BASE EXCESS BLDA CALC-SCNC: 1.1 MMOL/L
BASE EXCESS BLDA CALC-SCNC: 1.1 MMOL/L (ref -2–3)
BASE EXCESS BLDA CALC-SCNC: 1.2 MMOL/L
BASE EXCESS BLDA CALC-SCNC: 1.2 MMOL/L (ref -2–3)
BASE EXCESS BLDA CALC-SCNC: 1.3 MMOL/L (ref -2–3)
BASE EXCESS BLDA CALC-SCNC: 1.5 MMOL/L (ref -2–3)
BASE EXCESS BLDA CALC-SCNC: 1.6 MMOL/L
BASE EXCESS BLDA CALC-SCNC: 1.6 MMOL/L (ref -2–3)
BASE EXCESS BLDA CALC-SCNC: 1.7 MMOL/L
BASE EXCESS BLDA CALC-SCNC: 1.7 MMOL/L (ref -2–3)
BASE EXCESS BLDA CALC-SCNC: 1.7 MMOL/L (ref -2–3)
BASE EXCESS BLDA CALC-SCNC: 1.8 MMOL/L (ref -2–3)
BASE EXCESS BLDA CALC-SCNC: 1.9 MMOL/L (ref -2–3)
BASE EXCESS BLDA CALC-SCNC: 2.2 MMOL/L
BASE EXCESS BLDA CALC-SCNC: 2.2 MMOL/L
BASE EXCESS BLDA CALC-SCNC: 2.3 MMOL/L (ref -2–3)
BASE EXCESS BLDA CALC-SCNC: 2.3 MMOL/L (ref -2–3)
BASE EXCESS BLDA CALC-SCNC: 2.4 MMOL/L (ref -2–3)
BASE EXCESS BLDA CALC-SCNC: 2.6 MMOL/L
BASE EXCESS BLDA CALC-SCNC: 2.6 MMOL/L (ref -2–3)
BASE EXCESS BLDA CALC-SCNC: 3 MMOL/L (ref -2–3)
BASE EXCESS BLDA CALC-SCNC: 3.1 MMOL/L (ref -2–3)
BASE EXCESS BLDA CALC-SCNC: 3.1 MMOL/L (ref -2–3)
BASE EXCESS BLDA CALC-SCNC: 3.3 MMOL/L (ref -2–3)
BASE EXCESS BLDA CALC-SCNC: 3.4 MMOL/L
BASE EXCESS BLDA CALC-SCNC: 3.4 MMOL/L (ref -2–3)
BASE EXCESS BLDA CALC-SCNC: 3.4 MMOL/L (ref -2–3)
BASE EXCESS BLDA CALC-SCNC: 3.8 MMOL/L (ref -2–3)
BASE EXCESS BLDA CALC-SCNC: 3.9 MMOL/L (ref -2–3)
BASE EXCESS BLDA CALC-SCNC: 4.1 MMOL/L
BASE EXCESS BLDA CALC-SCNC: 4.1 MMOL/L
BASE EXCESS BLDA CALC-SCNC: 4.1 MMOL/L (ref -2–3)
BASE EXCESS BLDA CALC-SCNC: 4.2 MMOL/L (ref -2–3)
BASE EXCESS BLDA CALC-SCNC: 5.2 MMOL/L
BASE EXCESS BLDA CALC-SCNC: 5.3 MMOL/L (ref -2–3)
BASE EXCESS BLDA CALC-SCNC: 6.3 MMOL/L (ref -2–3)
BASE EXCESS BLDA CALC-SCNC: 6.4 MMOL/L (ref -2–3)
BASE EXCESS BLDMV CALC-SCNC: -0.5 MMOL/L (ref -2–3)
BASE EXCESS BLDMV CALC-SCNC: -0.7 MMOL/L (ref -2–3)
BASE EXCESS BLDMV CALC-SCNC: -1.1 MMOL/L (ref -2–3)
BASE EXCESS BLDMV CALC-SCNC: -1.7 MMOL/L (ref -2–3)
BASE EXCESS BLDMV CALC-SCNC: -2 MMOL/L (ref -2–3)
BASE EXCESS BLDMV CALC-SCNC: -2.6 MMOL/L (ref -2–3)
BASE EXCESS BLDMV CALC-SCNC: -2.8 MMOL/L (ref -2–3)
BASE EXCESS BLDMV CALC-SCNC: -2.8 MMOL/L (ref -2–3)
BASE EXCESS BLDMV CALC-SCNC: -2.9 MMOL/L (ref -2–3)
BASE EXCESS BLDMV CALC-SCNC: -3.1 MMOL/L (ref -2–3)
BASE EXCESS BLDMV CALC-SCNC: -3.8 MMOL/L (ref -2–3)
BASE EXCESS BLDMV CALC-SCNC: -3.9 MMOL/L (ref -2–3)
BASE EXCESS BLDMV CALC-SCNC: -3.9 MMOL/L (ref -2–3)
BASE EXCESS BLDMV CALC-SCNC: -4 MMOL/L (ref -2–3)
BASE EXCESS BLDMV CALC-SCNC: -4.3 MMOL/L (ref -2–3)
BASE EXCESS BLDMV CALC-SCNC: -4.4 MMOL/L (ref -2–3)
BASE EXCESS BLDMV CALC-SCNC: -5.4 MMOL/L (ref -2–3)
BASE EXCESS BLDMV CALC-SCNC: -5.8 MMOL/L (ref -2–3)
BASE EXCESS BLDMV CALC-SCNC: -7 MMOL/L (ref -2–3)
BASE EXCESS BLDMV CALC-SCNC: -7.1 MMOL/L (ref -2–3)
BASE EXCESS BLDMV CALC-SCNC: -7.6 MMOL/L (ref -2–3)
BASE EXCESS BLDMV CALC-SCNC: -8.5 MMOL/L (ref -2–3)
BASE EXCESS BLDMV CALC-SCNC: 0.8 MMOL/L (ref -2–3)
BASE EXCESS BLDMV CALC-SCNC: 1 MMOL/L (ref -2–3)
BASE EXCESS BLDMV CALC-SCNC: 1.1 MMOL/L (ref -2–3)
BASE EXCESS BLDMV CALC-SCNC: 1.5 MMOL/L (ref -2–3)
BASE EXCESS BLDMV CALC-SCNC: 1.6 MMOL/L (ref -2–3)
BASE EXCESS BLDMV CALC-SCNC: 1.7 MMOL/L (ref -2–3)
BASE EXCESS BLDMV CALC-SCNC: 1.8 MMOL/L (ref -2–3)
BASE EXCESS BLDMV CALC-SCNC: 2 MMOL/L (ref -2–3)
BASE EXCESS BLDMV CALC-SCNC: 2.3 MMOL/L (ref -2–3)
BASE EXCESS BLDMV CALC-SCNC: 2.4 MMOL/L (ref -2–3)
BASE EXCESS BLDMV CALC-SCNC: 2.5 MMOL/L (ref -2–3)
BASE EXCESS BLDMV CALC-SCNC: 2.5 MMOL/L (ref -2–3)
BASE EXCESS BLDMV CALC-SCNC: 2.8 MMOL/L (ref -2–3)
BASE EXCESS BLDMV CALC-SCNC: 2.9 MMOL/L (ref -2–3)
BASE EXCESS BLDMV CALC-SCNC: 2.9 MMOL/L (ref -2–3)
BASE EXCESS BLDMV CALC-SCNC: 3.6 MMOL/L (ref -2–3)
BASE EXCESS BLDMV CALC-SCNC: 3.8 MMOL/L (ref -2–3)
BASE EXCESS BLDMV CALC-SCNC: 4.3 MMOL/L (ref -2–3)
BASE EXCESS BLDMV CALC-SCNC: 4.4 MMOL/L (ref -2–3)
BASE EXCESS BLDV CALC-SCNC: -0.1 MMOL/L
BASE EXCESS BLDV CALC-SCNC: -0.3 MMOL/L
BASE EXCESS BLDV CALC-SCNC: -1.1 MMOL/L
BASE EXCESS BLDV CALC-SCNC: -1.3 MMOL/L
BASE EXCESS BLDV CALC-SCNC: -1.3 MMOL/L (ref -2–3)
BASE EXCESS BLDV CALC-SCNC: -1.4 MMOL/L
BASE EXCESS BLDV CALC-SCNC: -1.5 MMOL/L
BASE EXCESS BLDV CALC-SCNC: -1.7 MMOL/L
BASE EXCESS BLDV CALC-SCNC: -1.7 MMOL/L
BASE EXCESS BLDV CALC-SCNC: -2 MMOL/L (ref -2–3)
BASE EXCESS BLDV CALC-SCNC: -2.1 MMOL/L
BASE EXCESS BLDV CALC-SCNC: -2.4 MMOL/L
BASE EXCESS BLDV CALC-SCNC: -2.8 MMOL/L
BASE EXCESS BLDV CALC-SCNC: -2.9 MMOL/L
BASE EXCESS BLDV CALC-SCNC: -2.9 MMOL/L
BASE EXCESS BLDV CALC-SCNC: -3 MMOL/L
BASE EXCESS BLDV CALC-SCNC: -3.2 MMOL/L
BASE EXCESS BLDV CALC-SCNC: -3.5 MMOL/L
BASE EXCESS BLDV CALC-SCNC: -3.6 MMOL/L
BASE EXCESS BLDV CALC-SCNC: -3.9 MMOL/L
BASE EXCESS BLDV CALC-SCNC: -4.2 MMOL/L
BASE EXCESS BLDV CALC-SCNC: -4.8 MMOL/L (ref -2–3)
BASE EXCESS BLDV CALC-SCNC: -4.9 MMOL/L (ref -2–3)
BASE EXCESS BLDV CALC-SCNC: -5 MMOL/L (ref -2–3)
BASE EXCESS BLDV CALC-SCNC: -5.6 MMOL/L (ref -2–3)
BASE EXCESS BLDV CALC-SCNC: -7.9 MMOL/L
BASE EXCESS BLDV CALC-SCNC: -8.1 MMOL/L (ref -2–3)
BASE EXCESS BLDV CALC-SCNC: -8.4 MMOL/L
BASE EXCESS BLDV CALC-SCNC: -9.3 MMOL/L (ref -2–3)
BASE EXCESS BLDV CALC-SCNC: 0.2 MMOL/L
BASE EXCESS BLDV CALC-SCNC: 0.5 MMOL/L
BASE EXCESS BLDV CALC-SCNC: 0.7 MMOL/L
BASE EXCESS BLDV CALC-SCNC: 0.8 MMOL/L
BASE EXCESS BLDV CALC-SCNC: 1 MMOL/L
BASE EXCESS BLDV CALC-SCNC: 1.1 MMOL/L
BASE EXCESS BLDV CALC-SCNC: 1.2 MMOL/L
BASE EXCESS BLDV CALC-SCNC: 1.3 MMOL/L
BASE EXCESS BLDV CALC-SCNC: 1.5 MMOL/L
BASE EXCESS BLDV CALC-SCNC: 2.4 MMOL/L
BASE EXCESS BLDV CALC-SCNC: 2.9 MMOL/L
BASE EXCESS BLDV CALC-SCNC: 3.2 MMOL/L
BASE EXCESS BLDV CALC-SCNC: 3.4 MMOL/L
BASE EXCESS BLDV CALC-SCNC: 3.8 MMOL/L
BASE EXCESS BLDV CALC-SCNC: 4.4 MMOL/L (ref -2–3)
BASE EXCESS BLDV CALC-SCNC: 4.8 MMOL/L
BASE EXCESS BLDV CALC-SCNC: 5.1 MMOL/L
BASO STIPL BLD QL SMEAR: PRESENT
BASOPHILS # BLD AUTO: 0 X10*3/UL (ref 0–0.1)
BASOPHILS # BLD AUTO: 0.01 X10*3/UL (ref 0–0.1)
BASOPHILS # BLD AUTO: 0.02 X10*3/UL (ref 0–0.1)
BASOPHILS # BLD AUTO: 0.03 X10*3/UL (ref 0–0.1)
BASOPHILS # BLD AUTO: 0.05 X10*3/UL (ref 0–0.1)
BASOPHILS # BLD AUTO: 0.06 X10*3/UL (ref 0–0.1)
BASOPHILS # BLD MANUAL: 0 X10*3/UL (ref 0–0.1)
BASOPHILS # BLD MANUAL: 0.07 X10*3/UL (ref 0–0.1)
BASOPHILS NFR BLD AUTO: 0 %
BASOPHILS NFR BLD AUTO: 0.1 %
BASOPHILS NFR BLD AUTO: 0.2 %
BASOPHILS NFR BLD AUTO: 0.3 %
BASOPHILS NFR BLD AUTO: 0.4 %
BASOPHILS NFR BLD AUTO: 0.4 %
BASOPHILS NFR BLD AUTO: 0.6 %
BASOPHILS NFR BLD AUTO: 0.7 %
BASOPHILS NFR BLD AUTO: 0.7 %
BASOPHILS NFR BLD AUTO: 0.8 %
BASOPHILS NFR BLD AUTO: 0.8 %
BASOPHILS NFR BLD MANUAL: 0 %
BASOPHILS NFR BLD MANUAL: 1 %
BILIRUB DIRECT SERPL-MCNC: 0 MG/DL (ref 0–0.3)
BILIRUB DIRECT SERPL-MCNC: 0.1 MG/DL (ref 0–0.3)
BILIRUB DIRECT SERPL-MCNC: 0.1 MG/DL (ref 0–0.3)
BILIRUB DIRECT SERPL-MCNC: 0.2 MG/DL (ref 0–0.3)
BILIRUB DIRECT SERPL-MCNC: 0.3 MG/DL (ref 0–0.3)
BILIRUB DIRECT SERPL-MCNC: 0.4 MG/DL (ref 0–0.3)
BILIRUB DIRECT SERPL-MCNC: 0.5 MG/DL (ref 0–0.3)
BILIRUB DIRECT SERPL-MCNC: 0.6 MG/DL (ref 0–0.3)
BILIRUB DIRECT SERPL-MCNC: 0.7 MG/DL (ref 0–0.3)
BILIRUB DIRECT SERPL-MCNC: 0.7 MG/DL (ref 0–0.3)
BILIRUB DIRECT SERPL-MCNC: 0.8 MG/DL (ref 0–0.3)
BILIRUB DIRECT SERPL-MCNC: 0.9 MG/DL (ref 0–0.3)
BILIRUB DIRECT SERPL-MCNC: 1 MG/DL (ref 0–0.3)
BILIRUB DIRECT SERPL-MCNC: 1.1 MG/DL (ref 0–0.3)
BILIRUB DIRECT SERPL-MCNC: 1.2 MG/DL (ref 0–0.3)
BILIRUB DIRECT SERPL-MCNC: 1.3 MG/DL (ref 0–0.3)
BILIRUB DIRECT SERPL-MCNC: 1.5 MG/DL (ref 0–0.3)
BILIRUB DIRECT SERPL-MCNC: 1.5 MG/DL (ref 0–0.3)
BILIRUB DIRECT SERPL-MCNC: 1.6 MG/DL (ref 0–0.3)
BILIRUB DIRECT SERPL-MCNC: 1.7 MG/DL (ref 0–0.3)
BILIRUB DIRECT SERPL-MCNC: 1.8 MG/DL (ref 0–0.3)
BILIRUB DIRECT SERPL-MCNC: 1.9 MG/DL (ref 0–0.3)
BILIRUB DIRECT SERPL-MCNC: 2 MG/DL (ref 0–0.3)
BILIRUB DIRECT SERPL-MCNC: 2.1 MG/DL (ref 0–0.3)
BILIRUB DIRECT SERPL-MCNC: 2.1 MG/DL (ref 0–0.3)
BILIRUB DIRECT SERPL-MCNC: 2.5 MG/DL (ref 0–0.3)
BILIRUB DIRECT SERPL-MCNC: 2.6 MG/DL (ref 0–0.3)
BILIRUB SERPL-MCNC: 0.2 MG/DL (ref 0–1.2)
BILIRUB SERPL-MCNC: 0.2 MG/DL (ref 0–1.2)
BILIRUB SERPL-MCNC: 0.3 MG/DL (ref 0–1.2)
BILIRUB SERPL-MCNC: 0.3 MG/DL (ref 0–1.2)
BILIRUB SERPL-MCNC: 0.4 MG/DL (ref 0–1.2)
BILIRUB SERPL-MCNC: 0.5 MG/DL (ref 0–1.2)
BILIRUB SERPL-MCNC: 0.6 MG/DL (ref 0–1.2)
BILIRUB SERPL-MCNC: 0.7 MG/DL (ref 0–1.2)
BILIRUB SERPL-MCNC: 0.8 MG/DL (ref 0–1.2)
BILIRUB SERPL-MCNC: 0.9 MG/DL (ref 0–1.2)
BILIRUB SERPL-MCNC: 1 MG/DL (ref 0–1.2)
BILIRUB SERPL-MCNC: 1.1 MG/DL (ref 0–1.2)
BILIRUB SERPL-MCNC: 1.3 MG/DL (ref 0–1.2)
BILIRUB SERPL-MCNC: 1.5 MG/DL (ref 0–1.2)
BILIRUB SERPL-MCNC: 1.6 MG/DL (ref 0–1.2)
BILIRUB SERPL-MCNC: 1.7 MG/DL (ref 0–1.2)
BILIRUB SERPL-MCNC: 1.8 MG/DL (ref 0–1.2)
BILIRUB SERPL-MCNC: 1.9 MG/DL (ref 0–1.2)
BILIRUB SERPL-MCNC: 2 MG/DL (ref 0–1.2)
BILIRUB SERPL-MCNC: 2 MG/DL (ref 0–1.2)
BILIRUB SERPL-MCNC: 2.1 MG/DL (ref 0–1.2)
BILIRUB SERPL-MCNC: 2.1 MG/DL (ref 0–1.2)
BILIRUB SERPL-MCNC: 2.2 MG/DL (ref 0–1.2)
BILIRUB SERPL-MCNC: 2.4 MG/DL (ref 0–1.2)
BILIRUB SERPL-MCNC: 2.4 MG/DL (ref 0–1.2)
BILIRUB SERPL-MCNC: 2.6 MG/DL (ref 0–1.2)
BILIRUB SERPL-MCNC: 2.6 MG/DL (ref 0–1.2)
BILIRUB SERPL-MCNC: 2.8 MG/DL (ref 0–1.2)
BILIRUB SERPL-MCNC: 2.8 MG/DL (ref 0–1.2)
BILIRUB SERPL-MCNC: 2.9 MG/DL (ref 0–1.2)
BILIRUB SERPL-MCNC: 3.1 MG/DL (ref 0–1.2)
BILIRUB SERPL-MCNC: 3.2 MG/DL (ref 0–1.2)
BILIRUB SERPL-MCNC: 3.4 MG/DL (ref 0–1.2)
BILIRUB SERPL-MCNC: 3.5 MG/DL (ref 0–1.2)
BILIRUB SERPL-MCNC: 3.5 MG/DL (ref 0–1.2)
BILIRUB SERPL-MCNC: 3.6 MG/DL (ref 0–1.2)
BILIRUB SERPL-MCNC: 3.7 MG/DL (ref 0–1.2)
BILIRUB SERPL-MCNC: 3.8 MG/DL (ref 0–1.2)
BILIRUB SERPL-MCNC: 3.9 MG/DL (ref 0–1.2)
BILIRUB SERPL-MCNC: 4 MG/DL (ref 0–1.2)
BILIRUB SERPL-MCNC: 4.2 MG/DL (ref 0–1.2)
BILIRUB SERPL-MCNC: 4.2 MG/DL (ref 0–1.2)
BILIRUB SERPL-MCNC: 4.3 MG/DL (ref 0–1.2)
BILIRUB UR STRIP.AUTO-MCNC: ABNORMAL MG/DL
BILIRUB UR STRIP.AUTO-MCNC: NEGATIVE MG/DL
BLOOD EXPIRATION DATE: NORMAL
BNP SERPL-MCNC: 4683 PG/ML (ref 0–99)
BNP SERPL-MCNC: >5000 PG/ML (ref 0–99)
BNP SERPL-MCNC: >5000 PG/ML (ref 0–99)
BODY SURFACE AREA: 2.06 M2
BODY TEMPERATURE: 37 DEGREES CELSIUS
BUN SERPL-MCNC: 10 MG/DL (ref 6–23)
BUN SERPL-MCNC: 104 MG/DL (ref 6–23)
BUN SERPL-MCNC: 11 MG/DL (ref 6–23)
BUN SERPL-MCNC: 118 MG/DL (ref 6–23)
BUN SERPL-MCNC: 12 MG/DL (ref 6–23)
BUN SERPL-MCNC: 121 MG/DL (ref 6–23)
BUN SERPL-MCNC: 13 MG/DL (ref 6–23)
BUN SERPL-MCNC: 14 MG/DL (ref 6–23)
BUN SERPL-MCNC: 15 MG/DL (ref 6–23)
BUN SERPL-MCNC: 16 MG/DL (ref 6–23)
BUN SERPL-MCNC: 17 MG/DL (ref 6–23)
BUN SERPL-MCNC: 18 MG/DL (ref 6–23)
BUN SERPL-MCNC: 19 MG/DL (ref 6–23)
BUN SERPL-MCNC: 19 MG/DL (ref 6–23)
BUN SERPL-MCNC: 2 MG/DL (ref 6–23)
BUN SERPL-MCNC: 20 MG/DL (ref 6–23)
BUN SERPL-MCNC: 21 MG/DL (ref 6–23)
BUN SERPL-MCNC: 21 MG/DL (ref 6–23)
BUN SERPL-MCNC: 22 MG/DL (ref 6–23)
BUN SERPL-MCNC: 22 MG/DL (ref 6–23)
BUN SERPL-MCNC: 23 MG/DL (ref 6–23)
BUN SERPL-MCNC: 24 MG/DL (ref 6–23)
BUN SERPL-MCNC: 25 MG/DL (ref 6–23)
BUN SERPL-MCNC: 26 MG/DL (ref 6–23)
BUN SERPL-MCNC: 27 MG/DL (ref 6–23)
BUN SERPL-MCNC: 28 MG/DL (ref 6–23)
BUN SERPL-MCNC: 3 MG/DL (ref 6–23)
BUN SERPL-MCNC: 31 MG/DL (ref 6–23)
BUN SERPL-MCNC: 32 MG/DL (ref 6–23)
BUN SERPL-MCNC: 33 MG/DL (ref 6–23)
BUN SERPL-MCNC: 33 MG/DL (ref 6–23)
BUN SERPL-MCNC: 34 MG/DL (ref 6–23)
BUN SERPL-MCNC: 35 MG/DL (ref 6–23)
BUN SERPL-MCNC: 35 MG/DL (ref 6–23)
BUN SERPL-MCNC: 36 MG/DL (ref 6–23)
BUN SERPL-MCNC: 38 MG/DL (ref 6–23)
BUN SERPL-MCNC: 39 MG/DL (ref 6–23)
BUN SERPL-MCNC: 40 MG/DL (ref 6–23)
BUN SERPL-MCNC: 40 MG/DL (ref 6–23)
BUN SERPL-MCNC: 41 MG/DL (ref 6–23)
BUN SERPL-MCNC: 41 MG/DL (ref 6–23)
BUN SERPL-MCNC: 42 MG/DL (ref 6–23)
BUN SERPL-MCNC: 43 MG/DL (ref 6–23)
BUN SERPL-MCNC: 45 MG/DL (ref 6–23)
BUN SERPL-MCNC: 46 MG/DL (ref 6–23)
BUN SERPL-MCNC: 47 MG/DL (ref 6–23)
BUN SERPL-MCNC: 48 MG/DL (ref 6–23)
BUN SERPL-MCNC: 49 MG/DL (ref 6–23)
BUN SERPL-MCNC: 5 MG/DL (ref 6–23)
BUN SERPL-MCNC: 50 MG/DL (ref 6–23)
BUN SERPL-MCNC: 52 MG/DL (ref 6–23)
BUN SERPL-MCNC: 54 MG/DL (ref 6–23)
BUN SERPL-MCNC: 55 MG/DL (ref 6–23)
BUN SERPL-MCNC: 56 MG/DL (ref 6–23)
BUN SERPL-MCNC: 57 MG/DL (ref 6–23)
BUN SERPL-MCNC: 61 MG/DL (ref 6–23)
BUN SERPL-MCNC: 65 MG/DL (ref 6–23)
BUN SERPL-MCNC: 65 MG/DL (ref 6–23)
BUN SERPL-MCNC: 67 MG/DL (ref 6–23)
BUN SERPL-MCNC: 69 MG/DL (ref 6–23)
BUN SERPL-MCNC: 7 MG/DL (ref 6–23)
BUN SERPL-MCNC: 72 MG/DL (ref 6–23)
BUN SERPL-MCNC: 73 MG/DL (ref 6–23)
BUN SERPL-MCNC: 73 MG/DL (ref 6–23)
BUN SERPL-MCNC: 75 MG/DL (ref 6–23)
BUN SERPL-MCNC: 75 MG/DL (ref 6–23)
BUN SERPL-MCNC: 76 MG/DL (ref 6–23)
BUN SERPL-MCNC: 79 MG/DL (ref 6–23)
BUN SERPL-MCNC: 8 MG/DL (ref 6–23)
BUN SERPL-MCNC: 80 MG/DL (ref 6–23)
BUN SERPL-MCNC: 80 MG/DL (ref 6–23)
BUN SERPL-MCNC: 85 MG/DL (ref 6–23)
BUN SERPL-MCNC: 86 MG/DL (ref 6–23)
BUN SERPL-MCNC: 88 MG/DL (ref 6–23)
BUN SERPL-MCNC: 88 MG/DL (ref 6–23)
BUN SERPL-MCNC: 9 MG/DL (ref 6–23)
BUN SERPL-MCNC: 90 MG/DL (ref 6–23)
BUN SERPL-MCNC: 92 MG/DL (ref 6–23)
BUN SERPL-MCNC: 93 MG/DL (ref 6–23)
BUN SERPL-MCNC: 98 MG/DL (ref 6–23)
BUN SERPL-MCNC: 99 MG/DL (ref 6–23)
BURR CELLS BLD QL SMEAR: ABNORMAL
BURR CELLS BLD QL SMEAR: NORMAL
C DIF TOX TCDA+TCDB STL QL NAA+PROBE: NOT DETECTED
CA-I BLD-SCNC: 0.91 MMOL/L (ref 1.1–1.33)
CA-I BLD-SCNC: 0.91 MMOL/L (ref 1.1–1.33)
CA-I BLD-SCNC: 0.94 MMOL/L (ref 1.1–1.33)
CA-I BLD-SCNC: 0.95 MMOL/L (ref 1.1–1.33)
CA-I BLD-SCNC: 0.97 MMOL/L (ref 1.1–1.33)
CA-I BLD-SCNC: 0.97 MMOL/L (ref 1.1–1.33)
CA-I BLD-SCNC: 0.99 MMOL/L (ref 1.1–1.33)
CA-I BLD-SCNC: 0.99 MMOL/L (ref 1.1–1.33)
CA-I BLD-SCNC: 1 MMOL/L (ref 1.1–1.33)
CA-I BLD-SCNC: 1.01 MMOL/L (ref 1.1–1.33)
CA-I BLD-SCNC: 1.02 MMOL/L (ref 1.1–1.33)
CA-I BLD-SCNC: 1.03 MMOL/L (ref 1.1–1.33)
CA-I BLD-SCNC: 1.04 MMOL/L (ref 1.1–1.33)
CA-I BLD-SCNC: 1.05 MMOL/L (ref 1.1–1.33)
CA-I BLD-SCNC: 1.06 MMOL/L (ref 1.1–1.33)
CA-I BLD-SCNC: 1.07 MMOL/L (ref 1.1–1.33)
CA-I BLD-SCNC: 1.08 MMOL/L (ref 1.1–1.33)
CA-I BLD-SCNC: 1.09 MMOL/L (ref 1.1–1.33)
CA-I BLD-SCNC: 1.1 MMOL/L (ref 1.1–1.33)
CA-I BLD-SCNC: 1.11 MMOL/L (ref 1.1–1.33)
CA-I BLD-SCNC: 1.12 MMOL/L (ref 1.1–1.33)
CA-I BLD-SCNC: 1.13 MMOL/L (ref 1.1–1.33)
CA-I BLD-SCNC: 1.14 MMOL/L (ref 1.1–1.33)
CA-I BLD-SCNC: 1.15 MMOL/L (ref 1.1–1.33)
CA-I BLD-SCNC: 1.16 MMOL/L (ref 1.1–1.33)
CA-I BLD-SCNC: 1.17 MMOL/L (ref 1.1–1.33)
CA-I BLD-SCNC: 1.17 MMOL/L (ref 1.1–1.33)
CA-I BLD-SCNC: 1.18 MMOL/L (ref 1.1–1.33)
CA-I BLD-SCNC: 1.2 MMOL/L (ref 1.1–1.33)
CA-I BLD-SCNC: 1.2 MMOL/L (ref 1.1–1.33)
CA-I BLD-SCNC: 1.24 MMOL/L (ref 1.1–1.33)
CA-I BLD-SCNC: 1.31 MMOL/L (ref 1.1–1.33)
CA-I BLDA-SCNC: 0.95 MMOL/L (ref 1.1–1.33)
CA-I BLDA-SCNC: 0.96 MMOL/L (ref 1.1–1.33)
CA-I BLDA-SCNC: 0.98 MMOL/L (ref 1.1–1.33)
CA-I BLDA-SCNC: 0.98 MMOL/L (ref 1.1–1.33)
CA-I BLDA-SCNC: 0.99 MMOL/L (ref 1.1–1.33)
CA-I BLDA-SCNC: 1 MMOL/L (ref 1.1–1.33)
CA-I BLDA-SCNC: 1.01 MMOL/L (ref 1.1–1.33)
CA-I BLDA-SCNC: 1.02 MMOL/L (ref 1.1–1.33)
CA-I BLDA-SCNC: 1.03 MMOL/L (ref 1.1–1.33)
CA-I BLDA-SCNC: 1.04 MMOL/L (ref 1.1–1.33)
CA-I BLDA-SCNC: 1.05 MMOL/L (ref 1.1–1.33)
CA-I BLDA-SCNC: 1.06 MMOL/L (ref 1.1–1.33)
CA-I BLDA-SCNC: 1.07 MMOL/L (ref 1.1–1.33)
CA-I BLDA-SCNC: 1.08 MMOL/L (ref 1.1–1.33)
CA-I BLDA-SCNC: 1.09 MMOL/L (ref 1.1–1.33)
CA-I BLDA-SCNC: 1.1 MMOL/L (ref 1.1–1.33)
CA-I BLDA-SCNC: 1.11 MMOL/L (ref 1.1–1.33)
CA-I BLDA-SCNC: 1.12 MMOL/L (ref 1.1–1.33)
CA-I BLDA-SCNC: 1.13 MMOL/L (ref 1.1–1.33)
CA-I BLDA-SCNC: 1.14 MMOL/L (ref 1.1–1.33)
CA-I BLDA-SCNC: 1.15 MMOL/L (ref 1.1–1.33)
CA-I BLDA-SCNC: 1.16 MMOL/L (ref 1.1–1.33)
CA-I BLDA-SCNC: 1.17 MMOL/L (ref 1.1–1.33)
CA-I BLDA-SCNC: 1.18 MMOL/L (ref 1.1–1.33)
CA-I BLDA-SCNC: 1.19 MMOL/L (ref 1.1–1.33)
CA-I BLDA-SCNC: 1.19 MMOL/L (ref 1.1–1.33)
CA-I BLDA-SCNC: 1.2 MMOL/L (ref 1.1–1.33)
CA-I BLDA-SCNC: 1.21 MMOL/L (ref 1.1–1.33)
CA-I BLDA-SCNC: 1.21 MMOL/L (ref 1.1–1.33)
CA-I BLDA-SCNC: ABNORMAL MMOL/L
CA-I BLDMV-SCNC: 0.93 MMOL/L (ref 1.1–1.33)
CA-I BLDMV-SCNC: 0.97 MMOL/L (ref 1.1–1.33)
CA-I BLDMV-SCNC: 1 MMOL/L (ref 1.1–1.33)
CA-I BLDMV-SCNC: 1 MMOL/L (ref 1.1–1.33)
CA-I BLDMV-SCNC: 1.01 MMOL/L (ref 1.1–1.33)
CA-I BLDMV-SCNC: 1.02 MMOL/L (ref 1.1–1.33)
CA-I BLDMV-SCNC: 1.03 MMOL/L (ref 1.1–1.33)
CA-I BLDMV-SCNC: 1.04 MMOL/L (ref 1.1–1.33)
CA-I BLDMV-SCNC: 1.04 MMOL/L (ref 1.1–1.33)
CA-I BLDMV-SCNC: 1.05 MMOL/L (ref 1.1–1.33)
CA-I BLDMV-SCNC: 1.06 MMOL/L (ref 1.1–1.33)
CA-I BLDMV-SCNC: 1.07 MMOL/L (ref 1.1–1.33)
CA-I BLDMV-SCNC: 1.09 MMOL/L (ref 1.1–1.33)
CA-I BLDMV-SCNC: 1.09 MMOL/L (ref 1.1–1.33)
CA-I BLDMV-SCNC: 1.11 MMOL/L (ref 1.1–1.33)
CA-I BLDMV-SCNC: 1.12 MMOL/L (ref 1.1–1.33)
CA-I BLDMV-SCNC: 1.12 MMOL/L (ref 1.1–1.33)
CA-I BLDMV-SCNC: 1.13 MMOL/L (ref 1.1–1.33)
CA-I BLDMV-SCNC: 1.15 MMOL/L (ref 1.1–1.33)
CA-I BLDMV-SCNC: 1.16 MMOL/L (ref 1.1–1.33)
CA-I BLDMV-SCNC: 1.17 MMOL/L (ref 1.1–1.33)
CA-I BLDMV-SCNC: 1.18 MMOL/L (ref 1.1–1.33)
CA-I BLDMV-SCNC: 1.19 MMOL/L (ref 1.1–1.33)
CA-I BLDMV-SCNC: 1.19 MMOL/L (ref 1.1–1.33)
CA-I BLDMV-SCNC: 1.2 MMOL/L (ref 1.1–1.33)
CA-I BLDMV-SCNC: 1.2 MMOL/L (ref 1.1–1.33)
CA-I BLDMV-SCNC: 1.23 MMOL/L (ref 1.1–1.33)
CA-I BLDMV-SCNC: 1.23 MMOL/L (ref 1.1–1.33)
CA-I BLDMV-SCNC: 1.26 MMOL/L (ref 1.1–1.33)
CA-I BLDV-SCNC: 1.01 MMOL/L (ref 1.1–1.33)
CA-I BLDV-SCNC: 1.04 MMOL/L (ref 1.1–1.33)
CA-I BLDV-SCNC: 1.05 MMOL/L (ref 1.1–1.33)
CA-I BLDV-SCNC: 1.08 MMOL/L (ref 1.1–1.33)
CA-I BLDV-SCNC: 1.09 MMOL/L (ref 1.1–1.33)
CA-I BLDV-SCNC: 1.09 MMOL/L (ref 1.1–1.33)
CA-I BLDV-SCNC: 1.1 MMOL/L (ref 1.1–1.33)
CA-I BLDV-SCNC: 1.11 MMOL/L (ref 1.1–1.33)
CA-I BLDV-SCNC: 1.12 MMOL/L (ref 1.1–1.33)
CA-I BLDV-SCNC: 1.13 MMOL/L (ref 1.1–1.33)
CA-I BLDV-SCNC: 1.14 MMOL/L (ref 1.1–1.33)
CA-I BLDV-SCNC: 1.15 MMOL/L (ref 1.1–1.33)
CA-I BLDV-SCNC: 1.15 MMOL/L (ref 1.1–1.33)
CA-I BLDV-SCNC: 1.16 MMOL/L (ref 1.1–1.33)
CA-I BLDV-SCNC: 1.18 MMOL/L (ref 1.1–1.33)
CA-I BLDV-SCNC: 1.21 MMOL/L (ref 1.1–1.33)
CA-I DIAL FLD-SCNC: 1.01 MMOL/L
CA-I DIAL FLD-SCNC: 1.05 MMOL/L
CALCIUM SERPL-MCNC: 10.1 MG/DL (ref 8.6–10.6)
CALCIUM SERPL-MCNC: 10.2 MG/DL (ref 8.6–10.6)
CALCIUM SERPL-MCNC: 10.4 MG/DL (ref 8.6–10.6)
CALCIUM SERPL-MCNC: 10.6 MG/DL (ref 8.6–10.6)
CALCIUM SERPL-MCNC: 7.3 MG/DL (ref 8.6–10.6)
CALCIUM SERPL-MCNC: 7.3 MG/DL (ref 8.6–10.6)
CALCIUM SERPL-MCNC: 7.4 MG/DL (ref 8.6–10.6)
CALCIUM SERPL-MCNC: 7.5 MG/DL (ref 8.6–10.6)
CALCIUM SERPL-MCNC: 7.6 MG/DL (ref 8.6–10.6)
CALCIUM SERPL-MCNC: 7.7 MG/DL (ref 8.6–10.6)
CALCIUM SERPL-MCNC: 7.8 MG/DL (ref 8.6–10.6)
CALCIUM SERPL-MCNC: 7.9 MG/DL (ref 8.6–10.6)
CALCIUM SERPL-MCNC: 8 MG/DL (ref 8.6–10.6)
CALCIUM SERPL-MCNC: 8.1 MG/DL (ref 8.6–10.6)
CALCIUM SERPL-MCNC: 8.2 MG/DL (ref 8.6–10.6)
CALCIUM SERPL-MCNC: 8.3 MG/DL (ref 8.6–10.6)
CALCIUM SERPL-MCNC: 8.4 MG/DL (ref 8.6–10.3)
CALCIUM SERPL-MCNC: 8.4 MG/DL (ref 8.6–10.6)
CALCIUM SERPL-MCNC: 8.5 MG/DL (ref 8.6–10.6)
CALCIUM SERPL-MCNC: 8.6 MG/DL (ref 8.6–10.6)
CALCIUM SERPL-MCNC: 8.7 MG/DL (ref 8.6–10.6)
CALCIUM SERPL-MCNC: 8.8 MG/DL (ref 8.6–10.6)
CALCIUM SERPL-MCNC: 8.9 MG/DL (ref 8.6–10.6)
CALCIUM SERPL-MCNC: 9 MG/DL (ref 8.6–10.6)
CALCIUM SERPL-MCNC: 9.1 MG/DL (ref 8.6–10.6)
CALCIUM SERPL-MCNC: 9.1 MG/DL (ref 8.6–10.6)
CALCIUM SERPL-MCNC: 9.2 MG/DL (ref 8.6–10.6)
CALCIUM SERPL-MCNC: 9.3 MG/DL (ref 8.6–10.6)
CALCIUM SERPL-MCNC: 9.4 MG/DL (ref 8.6–10.6)
CALCIUM SERPL-MCNC: 9.5 MG/DL (ref 8.6–10.6)
CALCIUM SERPL-MCNC: 9.5 MG/DL (ref 8.6–10.6)
CALCIUM SERPL-MCNC: 9.6 MG/DL (ref 8.6–10.6)
CALPROTECTIN STL-MCNT: 380 UG/G
CARDIAC TROPONIN I PNL SERPL HS: 125 NG/L (ref 0–34)
CARDIAC TROPONIN I PNL SERPL HS: 168 NG/L (ref 0–34)
CARDIOLIPIN IGA SERPL-ACNC: <0.5 APL U/ML
CARDIOLIPIN IGG SER IA-ACNC: <1.6 GPL U/ML
CARDIOLIPIN IGM SER IA-ACNC: 0.2 MPL U/ML
CD3 CELLS # BLD: 0.01 X10E9/L
CD3 CELLS # BLD: 0.63 X10E9/L
CD3 CELLS NFR SPEC: 11 %
CD3 CELLS NFR SPEC: 37 %
CFT BLD TEG: 2.4 MIN (ref 0.8–2.1)
CFT BLD TEG: ABNORMAL S
CHLAMYDIA.PNEUMONIAE PCR, VIRC: NOT DETECTED
CHLORIDE BLD-SCNC: 100 MMOL/L (ref 98–107)
CHLORIDE BLD-SCNC: 101 MMOL/L (ref 98–107)
CHLORIDE BLD-SCNC: 103 MMOL/L (ref 98–107)
CHLORIDE BLD-SCNC: 103 MMOL/L (ref 98–107)
CHLORIDE BLD-SCNC: 90 MMOL/L (ref 98–107)
CHLORIDE BLD-SCNC: 90 MMOL/L (ref 98–107)
CHLORIDE BLD-SCNC: 91 MMOL/L (ref 98–107)
CHLORIDE BLD-SCNC: 92 MMOL/L (ref 98–107)
CHLORIDE BLD-SCNC: 93 MMOL/L (ref 98–107)
CHLORIDE BLD-SCNC: 93 MMOL/L (ref 98–107)
CHLORIDE BLD-SCNC: 94 MMOL/L (ref 98–107)
CHLORIDE BLD-SCNC: 95 MMOL/L (ref 98–107)
CHLORIDE BLD-SCNC: 96 MMOL/L (ref 98–107)
CHLORIDE BLD-SCNC: 97 MMOL/L (ref 98–107)
CHLORIDE BLD-SCNC: 98 MMOL/L (ref 98–107)
CHLORIDE BLD-SCNC: 99 MMOL/L (ref 98–107)
CHLORIDE BLDA-SCNC: 100 MMOL/L (ref 98–107)
CHLORIDE BLDA-SCNC: 101 MMOL/L (ref 98–107)
CHLORIDE BLDA-SCNC: 102 MMOL/L (ref 98–107)
CHLORIDE BLDA-SCNC: 103 MMOL/L (ref 98–107)
CHLORIDE BLDA-SCNC: 104 MMOL/L (ref 98–107)
CHLORIDE BLDA-SCNC: 105 MMOL/L (ref 98–107)
CHLORIDE BLDA-SCNC: 106 MMOL/L (ref 98–107)
CHLORIDE BLDA-SCNC: 106 MMOL/L (ref 98–107)
CHLORIDE BLDA-SCNC: 107 MMOL/L (ref 98–107)
CHLORIDE BLDA-SCNC: 107 MMOL/L (ref 98–107)
CHLORIDE BLDA-SCNC: 109 MMOL/L (ref 98–107)
CHLORIDE BLDA-SCNC: 109 MMOL/L (ref 98–107)
CHLORIDE BLDA-SCNC: 92 MMOL/L (ref 98–107)
CHLORIDE BLDA-SCNC: 92 MMOL/L (ref 98–107)
CHLORIDE BLDA-SCNC: 93 MMOL/L (ref 98–107)
CHLORIDE BLDA-SCNC: 93 MMOL/L (ref 98–107)
CHLORIDE BLDA-SCNC: 94 MMOL/L (ref 98–107)
CHLORIDE BLDA-SCNC: 95 MMOL/L (ref 98–107)
CHLORIDE BLDA-SCNC: 96 MMOL/L (ref 98–107)
CHLORIDE BLDA-SCNC: 97 MMOL/L (ref 98–107)
CHLORIDE BLDA-SCNC: 98 MMOL/L (ref 98–107)
CHLORIDE BLDA-SCNC: 99 MMOL/L (ref 98–107)
CHLORIDE BLDA-SCNC: ABNORMAL MMOL/L
CHLORIDE BLDV-SCNC: 100 MMOL/L (ref 98–107)
CHLORIDE BLDV-SCNC: 101 MMOL/L (ref 98–107)
CHLORIDE BLDV-SCNC: 102 MMOL/L (ref 98–107)
CHLORIDE BLDV-SCNC: 103 MMOL/L (ref 98–107)
CHLORIDE BLDV-SCNC: 92 MMOL/L (ref 98–107)
CHLORIDE BLDV-SCNC: 93 MMOL/L (ref 98–107)
CHLORIDE BLDV-SCNC: 94 MMOL/L (ref 98–107)
CHLORIDE BLDV-SCNC: 96 MMOL/L (ref 98–107)
CHLORIDE BLDV-SCNC: 97 MMOL/L (ref 98–107)
CHLORIDE BLDV-SCNC: 97 MMOL/L (ref 98–107)
CHLORIDE BLDV-SCNC: 99 MMOL/L (ref 98–107)
CHLORIDE SERPL-SCNC: 100 MMOL/L (ref 98–107)
CHLORIDE SERPL-SCNC: 101 MMOL/L (ref 98–107)
CHLORIDE SERPL-SCNC: 102 MMOL/L (ref 98–107)
CHLORIDE SERPL-SCNC: 104 MMOL/L (ref 98–107)
CHLORIDE SERPL-SCNC: 105 MMOL/L (ref 98–107)
CHLORIDE SERPL-SCNC: 105 MMOL/L (ref 98–107)
CHLORIDE SERPL-SCNC: 80 MMOL/L (ref 98–107)
CHLORIDE SERPL-SCNC: 88 MMOL/L (ref 98–107)
CHLORIDE SERPL-SCNC: 89 MMOL/L (ref 98–107)
CHLORIDE SERPL-SCNC: 90 MMOL/L (ref 98–107)
CHLORIDE SERPL-SCNC: 91 MMOL/L (ref 98–107)
CHLORIDE SERPL-SCNC: 92 MMOL/L (ref 98–107)
CHLORIDE SERPL-SCNC: 93 MMOL/L (ref 98–107)
CHLORIDE SERPL-SCNC: 94 MMOL/L (ref 98–107)
CHLORIDE SERPL-SCNC: 95 MMOL/L (ref 98–107)
CHLORIDE SERPL-SCNC: 95 MMOL/L (ref 98–107)
CHLORIDE SERPL-SCNC: 96 MMOL/L (ref 98–107)
CHLORIDE SERPL-SCNC: 97 MMOL/L (ref 98–107)
CHLORIDE SERPL-SCNC: 98 MMOL/L (ref 98–107)
CHLORIDE SERPL-SCNC: 99 MMOL/L (ref 98–107)
CHOLEST SERPL-MCNC: 162 MG/DL (ref 0–199)
CHOLESTEROL/HDL RATIO: 4.1
CK SERPL-CCNC: 113 U/L (ref 0–215)
CK SERPL-CCNC: 114 U/L (ref 0–215)
CK SERPL-CCNC: 153 U/L (ref 0–215)
CK SERPL-CCNC: 181 U/L (ref 0–215)
CK SERPL-CCNC: 71 U/L (ref 0–215)
CK SERPL-CCNC: 75 U/L (ref 0–215)
CK SERPL-CCNC: 81 U/L (ref 0–215)
CK SERPL-CCNC: 85 U/L (ref 0–215)
CK SERPL-CCNC: 86 U/L (ref 0–215)
CK SERPL-CCNC: 89 U/L (ref 0–215)
CK SERPL-CCNC: 99 U/L (ref 0–215)
CLOT ANGLE BLD TEG: 64 DEG (ref 63–78)
CLOT ANGLE BLD TEG: <39 DEG (ref 63–78)
CLOT INIT BLD TEG: 7.6 MIN (ref 4.6–9.1)
CLOT INIT BLD TEG: >17 MIN (ref 4.6–9.1)
CLOT INIT BLD TEG: >17 MIN (ref 4.6–9.1)
CLOT INIT P HPASE BLD TEG: 10.2 MIN (ref 4.3–8.3)
CLOT INIT P HPASE BLD TEG: 7.2 MIN (ref 4.3–8.3)
CLUE CELLS VAG LPF-#/AREA: NORMAL /[LPF]
CMV DNA SERPL NAA+PROBE-LOG IU: ABNORMAL {LOG_IU}/ML
CMV DNA SERPL NAA+PROBE-LOG IU: NORMAL {LOG_IU}/ML
CMV IGG AVIDITY SERPL IA-RTO: REACTIVE %
CMV IGM SERPL-ACNC: <8 AU/ML
CMV IGM SERPL-ACNC: <8 AU/ML
CO2 SERPL-SCNC: 14 MMOL/L (ref 21–32)
CO2 SERPL-SCNC: 15 MMOL/L (ref 21–32)
CO2 SERPL-SCNC: 15 MMOL/L (ref 21–32)
CO2 SERPL-SCNC: 16 MMOL/L (ref 21–32)
CO2 SERPL-SCNC: 17 MMOL/L (ref 21–32)
CO2 SERPL-SCNC: 18 MMOL/L (ref 21–32)
CO2 SERPL-SCNC: 19 MMOL/L (ref 21–32)
CO2 SERPL-SCNC: 20 MMOL/L (ref 21–32)
CO2 SERPL-SCNC: 21 MMOL/L (ref 21–32)
CO2 SERPL-SCNC: 22 MMOL/L (ref 21–32)
CO2 SERPL-SCNC: 23 MMOL/L (ref 21–32)
CO2 SERPL-SCNC: 24 MMOL/L (ref 21–32)
CO2 SERPL-SCNC: 25 MMOL/L (ref 21–32)
CO2 SERPL-SCNC: 26 MMOL/L (ref 21–32)
CO2 SERPL-SCNC: 27 MMOL/L (ref 21–32)
CO2 SERPL-SCNC: 28 MMOL/L (ref 21–32)
CO2 SERPL-SCNC: 29 MMOL/L (ref 21–32)
COHGB MFR BLDA: 0.2 %
COHGB MFR BLDA: 0.5 %
COHGB MFR BLDA: 1 %
COHGB MFR BLDV: 2.5 %
COLOR UR: ABNORMAL
COLOR UR: YELLOW
CREAT SERPL-MCNC: 0.31 MG/DL (ref 0.5–1.05)
CREAT SERPL-MCNC: 0.51 MG/DL (ref 0.5–1.05)
CREAT SERPL-MCNC: 0.56 MG/DL (ref 0.5–1.05)
CREAT SERPL-MCNC: 0.6 MG/DL (ref 0.5–1.05)
CREAT SERPL-MCNC: 0.61 MG/DL (ref 0.5–1.05)
CREAT SERPL-MCNC: 0.64 MG/DL (ref 0.5–1.05)
CREAT SERPL-MCNC: 0.64 MG/DL (ref 0.5–1.05)
CREAT SERPL-MCNC: 0.65 MG/DL (ref 0.5–1.05)
CREAT SERPL-MCNC: 0.66 MG/DL (ref 0.5–1.05)
CREAT SERPL-MCNC: 0.68 MG/DL (ref 0.5–1.05)
CREAT SERPL-MCNC: 0.69 MG/DL (ref 0.5–1.05)
CREAT SERPL-MCNC: 0.7 MG/DL (ref 0.5–1.05)
CREAT SERPL-MCNC: 0.71 MG/DL (ref 0.5–1.05)
CREAT SERPL-MCNC: 0.73 MG/DL (ref 0.5–1.05)
CREAT SERPL-MCNC: 0.74 MG/DL (ref 0.5–1.05)
CREAT SERPL-MCNC: 0.75 MG/DL (ref 0.5–1.05)
CREAT SERPL-MCNC: 0.77 MG/DL (ref 0.5–1.05)
CREAT SERPL-MCNC: 0.77 MG/DL (ref 0.5–1.05)
CREAT SERPL-MCNC: 0.78 MG/DL (ref 0.5–1.05)
CREAT SERPL-MCNC: 0.78 MG/DL (ref 0.5–1.05)
CREAT SERPL-MCNC: 0.79 MG/DL (ref 0.5–1.05)
CREAT SERPL-MCNC: 0.8 MG/DL (ref 0.5–1.05)
CREAT SERPL-MCNC: 0.81 MG/DL (ref 0.5–1.05)
CREAT SERPL-MCNC: 0.82 MG/DL (ref 0.5–1.05)
CREAT SERPL-MCNC: 0.82 MG/DL (ref 0.5–1.05)
CREAT SERPL-MCNC: 0.83 MG/DL (ref 0.5–1.05)
CREAT SERPL-MCNC: 0.85 MG/DL (ref 0.5–1.05)
CREAT SERPL-MCNC: 0.85 MG/DL (ref 0.5–1.05)
CREAT SERPL-MCNC: 0.92 MG/DL (ref 0.5–1.05)
CREAT SERPL-MCNC: 0.99 MG/DL (ref 0.5–1.05)
CREAT SERPL-MCNC: 1 MG/DL (ref 0.5–1.05)
CREAT SERPL-MCNC: 1 MG/DL (ref 0.5–1.05)
CREAT SERPL-MCNC: 1.02 MG/DL (ref 0.5–1.05)
CREAT SERPL-MCNC: 1.09 MG/DL (ref 0.5–1.05)
CREAT SERPL-MCNC: 1.11 MG/DL (ref 0.5–1.05)
CREAT SERPL-MCNC: 1.12 MG/DL (ref 0.5–1.05)
CREAT SERPL-MCNC: 1.14 MG/DL (ref 0.5–1.05)
CREAT SERPL-MCNC: 1.16 MG/DL (ref 0.5–1.05)
CREAT SERPL-MCNC: 1.17 MG/DL (ref 0.5–1.05)
CREAT SERPL-MCNC: 1.21 MG/DL (ref 0.5–1.05)
CREAT SERPL-MCNC: 1.21 MG/DL (ref 0.5–1.05)
CREAT SERPL-MCNC: 1.26 MG/DL (ref 0.5–1.05)
CREAT SERPL-MCNC: 1.26 MG/DL (ref 0.5–1.05)
CREAT SERPL-MCNC: 1.27 MG/DL (ref 0.5–1.05)
CREAT SERPL-MCNC: 1.27 MG/DL (ref 0.5–1.05)
CREAT SERPL-MCNC: 1.36 MG/DL (ref 0.5–1.05)
CREAT SERPL-MCNC: 1.38 MG/DL (ref 0.5–1.05)
CREAT SERPL-MCNC: 1.4 MG/DL (ref 0.5–1.05)
CREAT SERPL-MCNC: 1.49 MG/DL (ref 0.5–1.05)
CREAT SERPL-MCNC: 1.49 MG/DL (ref 0.5–1.05)
CREAT SERPL-MCNC: 1.52 MG/DL (ref 0.5–1.05)
CREAT SERPL-MCNC: 1.55 MG/DL (ref 0.5–1.05)
CREAT SERPL-MCNC: 1.58 MG/DL (ref 0.5–1.05)
CREAT SERPL-MCNC: 1.6 MG/DL (ref 0.5–1.05)
CREAT SERPL-MCNC: 1.63 MG/DL (ref 0.5–1.05)
CREAT SERPL-MCNC: 1.65 MG/DL (ref 0.5–1.05)
CREAT SERPL-MCNC: 1.73 MG/DL (ref 0.5–1.05)
CREAT SERPL-MCNC: 1.78 MG/DL (ref 0.5–1.05)
CREAT SERPL-MCNC: 1.81 MG/DL (ref 0.5–1.05)
CREAT SERPL-MCNC: 1.82 MG/DL (ref 0.5–1.05)
CREAT SERPL-MCNC: 1.89 MG/DL (ref 0.5–1.05)
CREAT SERPL-MCNC: 1.94 MG/DL (ref 0.5–1.05)
CREAT SERPL-MCNC: 1.95 MG/DL (ref 0.5–1.05)
CREAT SERPL-MCNC: 1.98 MG/DL (ref 0.5–1.05)
CREAT SERPL-MCNC: 1.99 MG/DL (ref 0.5–1.05)
CREAT SERPL-MCNC: 2.01 MG/DL (ref 0.5–1.05)
CREAT SERPL-MCNC: 2.02 MG/DL (ref 0.5–1.05)
CREAT SERPL-MCNC: 2.08 MG/DL (ref 0.5–1.05)
CREAT SERPL-MCNC: 2.12 MG/DL (ref 0.5–1.05)
CREAT SERPL-MCNC: 2.13 MG/DL (ref 0.5–1.05)
CREAT SERPL-MCNC: 2.19 MG/DL (ref 0.5–1.05)
CREAT SERPL-MCNC: 2.19 MG/DL (ref 0.5–1.05)
CREAT SERPL-MCNC: 2.23 MG/DL (ref 0.5–1.05)
CREAT SERPL-MCNC: 2.25 MG/DL (ref 0.5–1.05)
CREAT SERPL-MCNC: 2.26 MG/DL (ref 0.5–1.05)
CREAT SERPL-MCNC: 2.26 MG/DL (ref 0.5–1.05)
CREAT SERPL-MCNC: 2.29 MG/DL (ref 0.5–1.05)
CREAT SERPL-MCNC: 2.32 MG/DL (ref 0.5–1.05)
CREAT SERPL-MCNC: 2.38 MG/DL (ref 0.5–1.05)
CREAT SERPL-MCNC: 2.4 MG/DL (ref 0.5–1.05)
CREAT SERPL-MCNC: 2.41 MG/DL (ref 0.5–1.05)
CREAT SERPL-MCNC: 2.42 MG/DL (ref 0.5–1.05)
CREAT SERPL-MCNC: 2.48 MG/DL (ref 0.5–1.05)
CREAT SERPL-MCNC: 2.74 MG/DL (ref 0.5–1.05)
CREAT SERPL-MCNC: 2.75 MG/DL (ref 0.5–1.05)
CREAT SERPL-MCNC: 3.01 MG/DL (ref 0.5–1.05)
CREAT SERPL-MCNC: 3.07 MG/DL (ref 0.5–1.05)
CREAT SERPL-MCNC: 3.1 MG/DL (ref 0.5–1.05)
CREAT SERPL-MCNC: 3.21 MG/DL (ref 0.5–1.05)
CREAT SERPL-MCNC: 3.4 MG/DL (ref 0.5–1.05)
CREAT SERPL-MCNC: 3.41 MG/DL (ref 0.5–1.05)
CREAT SERPL-MCNC: 3.42 MG/DL (ref 0.5–1.05)
CREAT SERPL-MCNC: 3.46 MG/DL (ref 0.5–1.05)
CREAT SERPL-MCNC: 3.47 MG/DL (ref 0.5–1.05)
CREAT SERPL-MCNC: 3.5 MG/DL (ref 0.5–1.05)
CREAT SERPL-MCNC: 3.5 MG/DL (ref 0.5–1.05)
CREAT SERPL-MCNC: 3.55 MG/DL (ref 0.5–1.05)
CREAT SERPL-MCNC: 3.61 MG/DL (ref 0.5–1.05)
CREAT SERPL-MCNC: 3.62 MG/DL (ref 0.5–1.05)
CREAT SERPL-MCNC: 3.78 MG/DL (ref 0.5–1.05)
CREAT SERPL-MCNC: 3.81 MG/DL (ref 0.5–1.05)
CREAT SERPL-MCNC: 3.83 MG/DL (ref 0.5–1.05)
CREAT SERPL-MCNC: 3.85 MG/DL (ref 0.5–1.05)
CREAT SERPL-MCNC: 3.88 MG/DL (ref 0.5–1.05)
CREAT SERPL-MCNC: 4.11 MG/DL (ref 0.5–1.05)
CREAT SERPL-MCNC: 4.15 MG/DL (ref 0.5–1.05)
CREAT SERPL-MCNC: 4.2 MG/DL (ref 0.5–1.05)
CREAT SERPL-MCNC: 4.26 MG/DL (ref 0.5–1.05)
CREAT SERPL-MCNC: 4.37 MG/DL (ref 0.5–1.05)
CREAT SERPL-MCNC: 4.48 MG/DL (ref 0.5–1.05)
CREAT SERPL-MCNC: 4.57 MG/DL (ref 0.5–1.05)
CREAT SERPL-MCNC: 4.6 MG/DL (ref 0.5–1.05)
CREAT SERPL-MCNC: 4.62 MG/DL (ref 0.5–1.05)
CREAT SERPL-MCNC: 4.7 MG/DL (ref 0.5–1.05)
CREAT SERPL-MCNC: 4.82 MG/DL (ref 0.5–1.05)
CREAT SERPL-MCNC: 4.91 MG/DL (ref 0.5–1.05)
CREAT SERPL-MCNC: 4.97 MG/DL (ref 0.5–1.05)
CREAT SERPL-MCNC: 4.98 MG/DL (ref 0.5–1.05)
CREAT SERPL-MCNC: 5.03 MG/DL (ref 0.5–1.05)
CREAT SERPL-MCNC: 5.08 MG/DL (ref 0.5–1.05)
CREAT SERPL-MCNC: 5.17 MG/DL (ref 0.5–1.05)
CREAT SERPL-MCNC: 5.17 MG/DL (ref 0.5–1.05)
CREAT SERPL-MCNC: 5.21 MG/DL (ref 0.5–1.05)
CREAT SERPL-MCNC: 5.26 MG/DL (ref 0.5–1.05)
CREAT SERPL-MCNC: 5.6 MG/DL (ref 0.5–1.05)
CREAT SERPL-MCNC: 5.77 MG/DL (ref 0.5–1.05)
CREAT SERPL-MCNC: 5.78 MG/DL (ref 0.5–1.05)
CREAT UR-MCNC: 182.5 MG/DL (ref 20–320)
CREAT UR-MCNC: 84.4 MG/DL (ref 20–320)
CRP SERPL-MCNC: 4.27 MG/DL
CRP SERPL-MCNC: 6.06 MG/DL
CRYPTOC AG SPEC QL LA: NEGATIVE
CYTOMEGALOVIRUS DNA PCR, (NON-BLOOD SPECI: NOT DETECTED IU/ML
D DIMER PPP FEU-MCNC: 3178 NG/ML FEU
D DIMER PPP FEU-MCNC: 3948 NG/ML FEU
D DIMER PPP FEU-MCNC: 3973 NG/ML FEU
D DIMER PPP FEU-MCNC: 3987 NG/ML FEU
D DIMER PPP FEU-MCNC: 4041 NG/ML FEU
D DIMER PPP FEU-MCNC: 4072 NG/ML FEU
D DIMER PPP FEU-MCNC: 4208 NG/ML FEU
D DIMER PPP FEU-MCNC: 4304 NG/ML FEU
D DIMER PPP FEU-MCNC: 4415 NG/ML FEU
D DIMER PPP FEU-MCNC: 4535 NG/ML FEU
D DIMER PPP FEU-MCNC: 5494 NG/ML FEU
D DIMER PPP FEU-MCNC: 5563 NG/ML FEU
D DIMER PPP FEU-MCNC: 6598 NG/ML FEU
D DIMER PPP FEU-MCNC: 7632 NG/ML FEU
D DIMER PPP FEU-MCNC: ABNORMAL NG/ML FEU
DAT-POLYSPECIFIC: NORMAL
DIGOXIN SERPL-MCNC: 0.34 NG/ML (ref 0.8–?)
DIGOXIN SERPL-MCNC: 0.71 NG/ML (ref 0.8–?)
DISPENSE STATUS: NORMAL
DO-HGB MFR BLDA: 0 % (ref 0–5)
DO-HGB MFR BLDA: 1.5 % (ref 0–5)
DO-HGB MFR BLDA: 2.1 % (ref 0–5)
EBV DNA SPEC NAA+PROBE-LOG#: NORMAL {LOG_COPIES}/ML
EBV DNA SPEC NAA+PROBE-LOG#: NORMAL {LOG_COPIES}/ML
EBV EA IGG SER QL: ABNORMAL
EBV EA IGG SER QL: NEGATIVE
EBV NA AB SER QL: POSITIVE
EBV NA AB SER QL: POSITIVE
EBV VCA IGG SER IA-ACNC: POSITIVE
EBV VCA IGG SER IA-ACNC: POSITIVE
EBV VCA IGM SER IA-ACNC: NEGATIVE
EBV VCA IGM SER IA-ACNC: NEGATIVE
EGFRCR SERPLBLD CKD-EPI 2021: 10 ML/MIN/1.73M*2
EGFRCR SERPLBLD CKD-EPI 2021: 10 ML/MIN/1.73M*2
EGFRCR SERPLBLD CKD-EPI 2021: 11 ML/MIN/1.73M*2
EGFRCR SERPLBLD CKD-EPI 2021: 12 ML/MIN/1.73M*2
EGFRCR SERPLBLD CKD-EPI 2021: 13 ML/MIN/1.73M*2
EGFRCR SERPLBLD CKD-EPI 2021: 13 ML/MIN/1.73M*2
EGFRCR SERPLBLD CKD-EPI 2021: 14 ML/MIN/1.73M*2
EGFRCR SERPLBLD CKD-EPI 2021: 15 ML/MIN/1.73M*2
EGFRCR SERPLBLD CKD-EPI 2021: 16 ML/MIN/1.73M*2
EGFRCR SERPLBLD CKD-EPI 2021: 17 ML/MIN/1.73M*2
EGFRCR SERPLBLD CKD-EPI 2021: 18 ML/MIN/1.73M*2
EGFRCR SERPLBLD CKD-EPI 2021: 19 ML/MIN/1.73M*2
EGFRCR SERPLBLD CKD-EPI 2021: 20 ML/MIN/1.73M*2
EGFRCR SERPLBLD CKD-EPI 2021: 21 ML/MIN/1.73M*2
EGFRCR SERPLBLD CKD-EPI 2021: 23 ML/MIN/1.73M*2
EGFRCR SERPLBLD CKD-EPI 2021: 23 ML/MIN/1.73M*2
EGFRCR SERPLBLD CKD-EPI 2021: 26 ML/MIN/1.73M*2
EGFRCR SERPLBLD CKD-EPI 2021: 27 ML/MIN/1.73M*2
EGFRCR SERPLBLD CKD-EPI 2021: 28 ML/MIN/1.73M*2
EGFRCR SERPLBLD CKD-EPI 2021: 29 ML/MIN/1.73M*2
EGFRCR SERPLBLD CKD-EPI 2021: 30 ML/MIN/1.73M*2
EGFRCR SERPLBLD CKD-EPI 2021: 30 ML/MIN/1.73M*2
EGFRCR SERPLBLD CKD-EPI 2021: 31 ML/MIN/1.73M*2
EGFRCR SERPLBLD CKD-EPI 2021: 31 ML/MIN/1.73M*2
EGFRCR SERPLBLD CKD-EPI 2021: 32 ML/MIN/1.73M*2
EGFRCR SERPLBLD CKD-EPI 2021: 33 ML/MIN/1.73M*2
EGFRCR SERPLBLD CKD-EPI 2021: 34 ML/MIN/1.73M*2
EGFRCR SERPLBLD CKD-EPI 2021: 35 ML/MIN/1.73M*2
EGFRCR SERPLBLD CKD-EPI 2021: 35 ML/MIN/1.73M*2
EGFRCR SERPLBLD CKD-EPI 2021: 36 ML/MIN/1.73M*2
EGFRCR SERPLBLD CKD-EPI 2021: 37 ML/MIN/1.73M*2
EGFRCR SERPLBLD CKD-EPI 2021: 38 ML/MIN/1.73M*2
EGFRCR SERPLBLD CKD-EPI 2021: 39 ML/MIN/1.73M*2
EGFRCR SERPLBLD CKD-EPI 2021: 40 ML/MIN/1.73M*2
EGFRCR SERPLBLD CKD-EPI 2021: 42 ML/MIN/1.73M*2
EGFRCR SERPLBLD CKD-EPI 2021: 43 ML/MIN/1.73M*2
EGFRCR SERPLBLD CKD-EPI 2021: 44 ML/MIN/1.73M*2
EGFRCR SERPLBLD CKD-EPI 2021: 44 ML/MIN/1.73M*2
EGFRCR SERPLBLD CKD-EPI 2021: 45 ML/MIN/1.73M*2
EGFRCR SERPLBLD CKD-EPI 2021: 46 ML/MIN/1.73M*2
EGFRCR SERPLBLD CKD-EPI 2021: 48 ML/MIN/1.73M*2
EGFRCR SERPLBLD CKD-EPI 2021: 48 ML/MIN/1.73M*2
EGFRCR SERPLBLD CKD-EPI 2021: 51 ML/MIN/1.73M*2
EGFRCR SERPLBLD CKD-EPI 2021: 52 ML/MIN/1.73M*2
EGFRCR SERPLBLD CKD-EPI 2021: 53 ML/MIN/1.73M*2
EGFRCR SERPLBLD CKD-EPI 2021: 57 ML/MIN/1.73M*2
EGFRCR SERPLBLD CKD-EPI 2021: 58 ML/MIN/1.73M*2
EGFRCR SERPLBLD CKD-EPI 2021: 61 ML/MIN/1.73M*2
EGFRCR SERPLBLD CKD-EPI 2021: 61 ML/MIN/1.73M*2
EGFRCR SERPLBLD CKD-EPI 2021: 63 ML/MIN/1.73M*2
EGFRCR SERPLBLD CKD-EPI 2021: 65 ML/MIN/1.73M*2
EGFRCR SERPLBLD CKD-EPI 2021: 67 ML/MIN/1.73M*2
EGFRCR SERPLBLD CKD-EPI 2021: 68 ML/MIN/1.73M*2
EGFRCR SERPLBLD CKD-EPI 2021: 69 ML/MIN/1.73M*2
EGFRCR SERPLBLD CKD-EPI 2021: 75 ML/MIN/1.73M*2
EGFRCR SERPLBLD CKD-EPI 2021: 76 ML/MIN/1.73M*2
EGFRCR SERPLBLD CKD-EPI 2021: 76 ML/MIN/1.73M*2
EGFRCR SERPLBLD CKD-EPI 2021: 77 ML/MIN/1.73M*2
EGFRCR SERPLBLD CKD-EPI 2021: 84 ML/MIN/1.73M*2
EGFRCR SERPLBLD CKD-EPI 2021: 9 ML/MIN/1.73M*2
EGFRCR SERPLBLD CKD-EPI 2021: 9 ML/MIN/1.73M*2
EGFRCR SERPLBLD CKD-EPI 2021: >90 ML/MIN/1.73M*2
EJECTION FRACTION APICAL 4 CHAMBER: 20.5
EJECTION FRACTION APICAL 4 CHAMBER: 24.6
EJECTION FRACTION APICAL 4 CHAMBER: 25.6
EJECTION FRACTION APICAL 4 CHAMBER: 25.7
EJECTION FRACTION APICAL 4 CHAMBER: 28.6
EJECTION FRACTION APICAL 4 CHAMBER: 36.1
EJECTION FRACTION APICAL 4 CHAMBER: 43.8
EJECTION FRACTION APICAL 4 CHAMBER: 48.6
EJECTION FRACTION: 16 %
EJECTION FRACTION: 33 %
EJECTION FRACTION: 34 %
EJECTION FRACTION: 35 %
EJECTION FRACTION: 40 %
EJECTION FRACTION: 42 %
EJECTION FRACTION: 52 %
ENTEROVIRUS/RHINOVIRUS RVP, VIRC: NOT DETECTED
ENTEROVIRUS/RHINOVIRUS RVP, VIRC: NOT DETECTED
EOSINOPHIL # BLD AUTO: 0 X10*3/UL (ref 0–0.7)
EOSINOPHIL # BLD AUTO: 0.01 X10*3/UL (ref 0–0.7)
EOSINOPHIL # BLD AUTO: 0.02 X10*3/UL (ref 0–0.7)
EOSINOPHIL # BLD AUTO: 0.02 X10*3/UL (ref 0–0.7)
EOSINOPHIL # BLD AUTO: 0.03 X10*3/UL (ref 0–0.7)
EOSINOPHIL # BLD AUTO: 0.04 X10*3/UL (ref 0–0.7)
EOSINOPHIL # BLD AUTO: 0.04 X10*3/UL (ref 0–0.7)
EOSINOPHIL # BLD AUTO: 0.06 X10*3/UL (ref 0–0.7)
EOSINOPHIL # BLD AUTO: 0.06 X10*3/UL (ref 0–0.7)
EOSINOPHIL # BLD AUTO: 0.07 X10*3/UL (ref 0–0.7)
EOSINOPHIL # BLD AUTO: 0.09 X10*3/UL (ref 0–0.7)
EOSINOPHIL # BLD AUTO: 0.11 X10*3/UL (ref 0–0.7)
EOSINOPHIL # BLD AUTO: 0.13 X10*3/UL (ref 0–0.7)
EOSINOPHIL # BLD AUTO: 0.14 X10*3/UL (ref 0–0.7)
EOSINOPHIL # BLD AUTO: 0.27 X10*3/UL (ref 0–0.7)
EOSINOPHIL # BLD MANUAL: 0 X10*3/UL (ref 0–0.7)
EOSINOPHIL # BLD MANUAL: 0.04 X10*3/UL (ref 0–0.7)
EOSINOPHIL # BLD MANUAL: 0.05 X10*3/UL (ref 0–0.7)
EOSINOPHIL # BLD MANUAL: 0.07 X10*3/UL (ref 0–0.7)
EOSINOPHIL # BLD MANUAL: 0.09 X10*3/UL (ref 0–0.7)
EOSINOPHIL # BLD MANUAL: 0.1 X10*3/UL (ref 0–0.7)
EOSINOPHIL # BLD MANUAL: 0.1 X10*3/UL (ref 0–0.7)
EOSINOPHIL # BLD MANUAL: 0.11 X10*3/UL (ref 0–0.7)
EOSINOPHIL # BLD MANUAL: 0.14 X10*3/UL (ref 0–0.7)
EOSINOPHIL # BLD MANUAL: 0.17 X10*3/UL (ref 0–0.7)
EOSINOPHIL # BLD MANUAL: 0.24 X10*3/UL (ref 0–0.7)
EOSINOPHIL NFR BLD AUTO: 0 %
EOSINOPHIL NFR BLD AUTO: 0.1 %
EOSINOPHIL NFR BLD AUTO: 0.2 %
EOSINOPHIL NFR BLD AUTO: 0.3 %
EOSINOPHIL NFR BLD AUTO: 0.4 %
EOSINOPHIL NFR BLD AUTO: 0.5 %
EOSINOPHIL NFR BLD AUTO: 0.6 %
EOSINOPHIL NFR BLD AUTO: 0.7 %
EOSINOPHIL NFR BLD AUTO: 0.8 %
EOSINOPHIL NFR BLD AUTO: 1.2 %
EOSINOPHIL NFR BLD AUTO: 1.2 %
EOSINOPHIL NFR BLD AUTO: 1.3 %
EOSINOPHIL NFR BLD AUTO: 3.9 %
EOSINOPHIL NFR BLD AUTO: 7.2 %
EOSINOPHIL NFR BLD MANUAL: 0 %
EOSINOPHIL NFR BLD MANUAL: 0.8 %
EOSINOPHIL NFR BLD MANUAL: 0.8 %
EOSINOPHIL NFR BLD MANUAL: 0.9 %
EOSINOPHIL NFR BLD MANUAL: 1 %
EOSINOPHIL NFR BLD MANUAL: 1.7 %
EOSINOPHIL NFR BLD MANUAL: 1.7 %
EOSINOPHIL NFR BLD MANUAL: 1.8 %
EOSINOPHIL NFR BLD MANUAL: 5.3 %
ERYTHROCYTE [DISTWIDTH] IN BLOOD BY AUTOMATED COUNT: 14.1 % (ref 11.5–14.5)
ERYTHROCYTE [DISTWIDTH] IN BLOOD BY AUTOMATED COUNT: 14.1 % (ref 11.5–14.5)
ERYTHROCYTE [DISTWIDTH] IN BLOOD BY AUTOMATED COUNT: 14.2 % (ref 11.5–14.5)
ERYTHROCYTE [DISTWIDTH] IN BLOOD BY AUTOMATED COUNT: 14.3 % (ref 11.5–14.5)
ERYTHROCYTE [DISTWIDTH] IN BLOOD BY AUTOMATED COUNT: 14.5 % (ref 11.5–14.5)
ERYTHROCYTE [DISTWIDTH] IN BLOOD BY AUTOMATED COUNT: 14.5 % (ref 11.5–14.5)
ERYTHROCYTE [DISTWIDTH] IN BLOOD BY AUTOMATED COUNT: 14.6 % (ref 11.5–14.5)
ERYTHROCYTE [DISTWIDTH] IN BLOOD BY AUTOMATED COUNT: 14.7 % (ref 11.5–14.5)
ERYTHROCYTE [DISTWIDTH] IN BLOOD BY AUTOMATED COUNT: 14.8 % (ref 11.5–14.5)
ERYTHROCYTE [DISTWIDTH] IN BLOOD BY AUTOMATED COUNT: 14.9 % (ref 11.5–14.5)
ERYTHROCYTE [DISTWIDTH] IN BLOOD BY AUTOMATED COUNT: 15 % (ref 11.5–14.5)
ERYTHROCYTE [DISTWIDTH] IN BLOOD BY AUTOMATED COUNT: 15.1 % (ref 11.5–14.5)
ERYTHROCYTE [DISTWIDTH] IN BLOOD BY AUTOMATED COUNT: 15.1 % (ref 11.5–14.5)
ERYTHROCYTE [DISTWIDTH] IN BLOOD BY AUTOMATED COUNT: 15.2 % (ref 11.5–14.5)
ERYTHROCYTE [DISTWIDTH] IN BLOOD BY AUTOMATED COUNT: 15.3 % (ref 11.5–14.5)
ERYTHROCYTE [DISTWIDTH] IN BLOOD BY AUTOMATED COUNT: 15.4 % (ref 11.5–14.5)
ERYTHROCYTE [DISTWIDTH] IN BLOOD BY AUTOMATED COUNT: 15.5 % (ref 11.5–14.5)
ERYTHROCYTE [DISTWIDTH] IN BLOOD BY AUTOMATED COUNT: 15.5 % (ref 11.5–14.5)
ERYTHROCYTE [DISTWIDTH] IN BLOOD BY AUTOMATED COUNT: 15.6 % (ref 11.5–14.5)
ERYTHROCYTE [DISTWIDTH] IN BLOOD BY AUTOMATED COUNT: 15.7 % (ref 11.5–14.5)
ERYTHROCYTE [DISTWIDTH] IN BLOOD BY AUTOMATED COUNT: 15.7 % (ref 11.5–14.5)
ERYTHROCYTE [DISTWIDTH] IN BLOOD BY AUTOMATED COUNT: 15.8 % (ref 11.5–14.5)
ERYTHROCYTE [DISTWIDTH] IN BLOOD BY AUTOMATED COUNT: 15.9 % (ref 11.5–14.5)
ERYTHROCYTE [DISTWIDTH] IN BLOOD BY AUTOMATED COUNT: 16 % (ref 11.5–14.5)
ERYTHROCYTE [DISTWIDTH] IN BLOOD BY AUTOMATED COUNT: 16.2 % (ref 11.5–14.5)
ERYTHROCYTE [DISTWIDTH] IN BLOOD BY AUTOMATED COUNT: 16.3 % (ref 11.5–14.5)
ERYTHROCYTE [DISTWIDTH] IN BLOOD BY AUTOMATED COUNT: 16.3 % (ref 11.5–14.5)
ERYTHROCYTE [DISTWIDTH] IN BLOOD BY AUTOMATED COUNT: 16.5 % (ref 11.5–14.5)
ERYTHROCYTE [DISTWIDTH] IN BLOOD BY AUTOMATED COUNT: 16.6 % (ref 11.5–14.5)
ERYTHROCYTE [DISTWIDTH] IN BLOOD BY AUTOMATED COUNT: 17.1 % (ref 11.5–14.5)
ERYTHROCYTE [DISTWIDTH] IN BLOOD BY AUTOMATED COUNT: 17.1 % (ref 11.5–14.5)
ERYTHROCYTE [DISTWIDTH] IN BLOOD BY AUTOMATED COUNT: 17.2 % (ref 11.5–14.5)
ERYTHROCYTE [DISTWIDTH] IN BLOOD BY AUTOMATED COUNT: 17.4 % (ref 11.5–14.5)
ERYTHROCYTE [DISTWIDTH] IN BLOOD BY AUTOMATED COUNT: 17.4 % (ref 11.5–14.5)
ERYTHROCYTE [DISTWIDTH] IN BLOOD BY AUTOMATED COUNT: 17.7 % (ref 11.5–14.5)
ERYTHROCYTE [DISTWIDTH] IN BLOOD BY AUTOMATED COUNT: 17.8 % (ref 11.5–14.5)
ERYTHROCYTE [DISTWIDTH] IN BLOOD BY AUTOMATED COUNT: 17.8 % (ref 11.5–14.5)
ERYTHROCYTE [DISTWIDTH] IN BLOOD BY AUTOMATED COUNT: 17.9 % (ref 11.5–14.5)
ERYTHROCYTE [DISTWIDTH] IN BLOOD BY AUTOMATED COUNT: 17.9 % (ref 11.5–14.5)
ERYTHROCYTE [DISTWIDTH] IN BLOOD BY AUTOMATED COUNT: 18 % (ref 11.5–14.5)
ERYTHROCYTE [DISTWIDTH] IN BLOOD BY AUTOMATED COUNT: 18.2 % (ref 11.5–14.5)
ERYTHROCYTE [DISTWIDTH] IN BLOOD BY AUTOMATED COUNT: 18.2 % (ref 11.5–14.5)
ERYTHROCYTE [DISTWIDTH] IN BLOOD BY AUTOMATED COUNT: 18.6 % (ref 11.5–14.5)
ERYTHROCYTE [DISTWIDTH] IN BLOOD BY AUTOMATED COUNT: 19 % (ref 11.5–14.5)
ERYTHROCYTE [DISTWIDTH] IN BLOOD BY AUTOMATED COUNT: 19 % (ref 11.5–14.5)
ERYTHROCYTE [DISTWIDTH] IN BLOOD BY AUTOMATED COUNT: 19.2 % (ref 11.5–14.5)
ERYTHROCYTE [DISTWIDTH] IN BLOOD BY AUTOMATED COUNT: 19.2 % (ref 11.5–14.5)
ERYTHROCYTE [DISTWIDTH] IN BLOOD BY AUTOMATED COUNT: 19.5 % (ref 11.5–14.5)
ERYTHROCYTE [DISTWIDTH] IN BLOOD BY AUTOMATED COUNT: 19.6 % (ref 11.5–14.5)
ERYTHROCYTE [DISTWIDTH] IN BLOOD BY AUTOMATED COUNT: 19.6 % (ref 11.5–14.5)
ERYTHROCYTE [DISTWIDTH] IN BLOOD BY AUTOMATED COUNT: 19.8 % (ref 11.5–14.5)
ERYTHROCYTE [DISTWIDTH] IN BLOOD BY AUTOMATED COUNT: 19.9 % (ref 11.5–14.5)
ERYTHROCYTE [DISTWIDTH] IN BLOOD BY AUTOMATED COUNT: 19.9 % (ref 11.5–14.5)
ERYTHROCYTE [DISTWIDTH] IN BLOOD BY AUTOMATED COUNT: 20 % (ref 11.5–14.5)
ERYTHROCYTE [DISTWIDTH] IN BLOOD BY AUTOMATED COUNT: 20.1 % (ref 11.5–14.5)
ERYTHROCYTE [DISTWIDTH] IN BLOOD BY AUTOMATED COUNT: 20.1 % (ref 11.5–14.5)
ERYTHROCYTE [DISTWIDTH] IN BLOOD BY AUTOMATED COUNT: 20.2 % (ref 11.5–14.5)
ERYTHROCYTE [DISTWIDTH] IN BLOOD BY AUTOMATED COUNT: 20.3 % (ref 11.5–14.5)
ERYTHROCYTE [DISTWIDTH] IN BLOOD BY AUTOMATED COUNT: 20.7 % (ref 11.5–14.5)
ERYTHROCYTE [DISTWIDTH] IN BLOOD BY AUTOMATED COUNT: 20.8 % (ref 11.5–14.5)
ERYTHROCYTE [DISTWIDTH] IN BLOOD BY AUTOMATED COUNT: 20.9 % (ref 11.5–14.5)
ERYTHROCYTE [DISTWIDTH] IN BLOOD BY AUTOMATED COUNT: 21 % (ref 11.5–14.5)
ERYTHROCYTE [DISTWIDTH] IN BLOOD BY AUTOMATED COUNT: 21 % (ref 11.5–14.5)
ERYTHROCYTE [DISTWIDTH] IN BLOOD BY AUTOMATED COUNT: 21.2 % (ref 11.5–14.5)
ERYTHROCYTE [DISTWIDTH] IN BLOOD BY AUTOMATED COUNT: 21.3 % (ref 11.5–14.5)
ERYTHROCYTE [DISTWIDTH] IN BLOOD BY AUTOMATED COUNT: 21.5 % (ref 11.5–14.5)
ERYTHROCYTE [DISTWIDTH] IN BLOOD BY AUTOMATED COUNT: 21.7 % (ref 11.5–14.5)
ERYTHROCYTE [DISTWIDTH] IN BLOOD BY AUTOMATED COUNT: 21.8 % (ref 11.5–14.5)
ERYTHROCYTE [DISTWIDTH] IN BLOOD BY AUTOMATED COUNT: 21.8 % (ref 11.5–14.5)
ERYTHROCYTE [DISTWIDTH] IN BLOOD BY AUTOMATED COUNT: 21.9 % (ref 11.5–14.5)
ERYTHROCYTE [DISTWIDTH] IN BLOOD BY AUTOMATED COUNT: 22 % (ref 11.5–14.5)
ERYTHROCYTE [DISTWIDTH] IN BLOOD BY AUTOMATED COUNT: 22.1 % (ref 11.5–14.5)
ERYTHROCYTE [DISTWIDTH] IN BLOOD BY AUTOMATED COUNT: 22.2 % (ref 11.5–14.5)
ERYTHROCYTE [DISTWIDTH] IN BLOOD BY AUTOMATED COUNT: 22.3 % (ref 11.5–14.5)
ERYTHROCYTE [DISTWIDTH] IN BLOOD BY AUTOMATED COUNT: 22.4 % (ref 11.5–14.5)
ERYTHROCYTE [DISTWIDTH] IN BLOOD BY AUTOMATED COUNT: 22.5 % (ref 11.5–14.5)
ERYTHROCYTE [DISTWIDTH] IN BLOOD BY AUTOMATED COUNT: 22.6 % (ref 11.5–14.5)
ERYTHROCYTE [DISTWIDTH] IN BLOOD BY AUTOMATED COUNT: 22.8 % (ref 11.5–14.5)
ERYTHROCYTE [DISTWIDTH] IN BLOOD BY AUTOMATED COUNT: 23 % (ref 11.5–14.5)
ERYTHROCYTE [DISTWIDTH] IN BLOOD BY AUTOMATED COUNT: 23 % (ref 11.5–14.5)
ERYTHROCYTE [DISTWIDTH] IN BLOOD BY AUTOMATED COUNT: 23.1 % (ref 11.5–14.5)
ERYTHROCYTE [DISTWIDTH] IN BLOOD BY AUTOMATED COUNT: 23.2 % (ref 11.5–14.5)
ERYTHROCYTE [DISTWIDTH] IN BLOOD BY AUTOMATED COUNT: 23.4 % (ref 11.5–14.5)
ERYTHROCYTE [DISTWIDTH] IN BLOOD BY AUTOMATED COUNT: 23.4 % (ref 11.5–14.5)
ERYTHROCYTE [DISTWIDTH] IN BLOOD BY AUTOMATED COUNT: 23.5 % (ref 11.5–14.5)
ERYTHROCYTE [DISTWIDTH] IN BLOOD BY AUTOMATED COUNT: 23.6 % (ref 11.5–14.5)
ERYTHROCYTE [DISTWIDTH] IN BLOOD BY AUTOMATED COUNT: 23.7 % (ref 11.5–14.5)
ERYTHROCYTE [DISTWIDTH] IN BLOOD BY AUTOMATED COUNT: 23.9 % (ref 11.5–14.5)
ERYTHROCYTE [DISTWIDTH] IN BLOOD BY AUTOMATED COUNT: 24.4 % (ref 11.5–14.5)
ERYTHROCYTE [DISTWIDTH] IN BLOOD BY AUTOMATED COUNT: 24.7 % (ref 11.5–14.5)
ERYTHROCYTE [DISTWIDTH] IN BLOOD BY AUTOMATED COUNT: 25.4 % (ref 11.5–14.5)
ERYTHROCYTE [SEDIMENTATION RATE] IN BLOOD BY WESTERGREN METHOD: 11 MM/H (ref 0–20)
ERYTHROCYTE [SEDIMENTATION RATE] IN BLOOD BY WESTERGREN METHOD: <1 MM/H (ref 0–20)
EST. AVERAGE GLUCOSE BLD GHB EST-MCNC: 117 MG/DL
EST. AVERAGE GLUCOSE BLD GHB EST-MCNC: 134 MG/DL
FACT IX ACT/NOR PPP: 107 % (ref 65–150)
FACT VII ACT/NOR PPP: 19 % (ref 50–150)
FACT VIII ACT/NOR PPP: 376 % (ref 55–180)
FACT X ACT/NOR PPP: 27 % (ref 75–130)
FERRITIN SERPL-MCNC: 308 NG/ML (ref 8–150)
FIBRINOGEN BLD CALC-MCNC: 181 MG/DL (ref 278–581)
FIBRINOGEN BLD CALC-MCNC: 214 MG/DL (ref 278–581)
FIBRINOGEN PPP-MCNC: 152 MG/DL (ref 200–400)
FIBRINOGEN PPP-MCNC: 154 MG/DL (ref 200–400)
FIBRINOGEN PPP-MCNC: 165 MG/DL (ref 200–400)
FIBRINOGEN PPP-MCNC: 166 MG/DL (ref 200–400)
FIBRINOGEN PPP-MCNC: 166 MG/DL (ref 200–400)
FIBRINOGEN PPP-MCNC: 169 MG/DL (ref 200–400)
FIBRINOGEN PPP-MCNC: 171 MG/DL (ref 200–400)
FIBRINOGEN PPP-MCNC: 181 MG/DL (ref 200–400)
FIBRINOGEN PPP-MCNC: 188 MG/DL (ref 200–400)
FIBRINOGEN PPP-MCNC: 195 MG/DL (ref 200–400)
FIBRINOGEN PPP-MCNC: 198 MG/DL (ref 200–400)
FIBRINOGEN PPP-MCNC: 201 MG/DL (ref 200–400)
FIBRINOGEN PPP-MCNC: 204 MG/DL (ref 200–400)
FIBRINOGEN PPP-MCNC: 204 MG/DL (ref 200–400)
FIBRINOGEN PPP-MCNC: 213 MG/DL (ref 200–400)
FIBRINOGEN PPP-MCNC: 215 MG/DL (ref 200–400)
FIBRINOGEN PPP-MCNC: 219 MG/DL (ref 200–400)
FIBRINOGEN PPP-MCNC: 219 MG/DL (ref 200–400)
FIBRINOGEN PPP-MCNC: 221 MG/DL (ref 200–400)
FIBRINOGEN PPP-MCNC: 223 MG/DL (ref 200–400)
FIBRINOGEN PPP-MCNC: 234 MG/DL (ref 200–400)
FIBRINOGEN PPP-MCNC: 243 MG/DL (ref 200–400)
FIBRINOGEN PPP-MCNC: 260 MG/DL (ref 200–400)
FIBRINOGEN PPP-MCNC: 263 MG/DL (ref 200–400)
FIBRINOGEN PPP-MCNC: 263 MG/DL (ref 200–400)
FIBRINOGEN PPP-MCNC: 266 MG/DL (ref 200–400)
FIBRINOGEN PPP-MCNC: 274 MG/DL (ref 200–400)
FIBRINOGEN PPP-MCNC: 279 MG/DL (ref 200–400)
FIBRINOGEN PPP-MCNC: 355 MG/DL (ref 200–400)
FIBRINOGEN PPP-MCNC: 550 MG/DL (ref 200–400)
FLOW ALLOCROSSMATCH PEAK: NORMAL
FLOW ALLOCROSSMATCH PEAK: NORMAL
FLOW ALLOCROSSMATCH: NORMAL
FLOW AUTOCROSSMATCH: NORMAL
FLUAV RNA RESP QL NAA+PROBE: NOT DETECTED
FLUAV RNA RESP QL NAA+PROBE: NOT DETECTED
FLUBV RNA RESP QL NAA+PROBE: NOT DETECTED
FLUBV RNA RESP QL NAA+PROBE: NOT DETECTED
FOLATE SERPL-MCNC: 23.9 NG/ML
FUNGITELL BETA-D GLUCAN,SERUM: 195 PG/ML
GFR SERPL CREATININE-BSD FRML MDRD: 64 ML/MIN/1.73M*2
GLUCOSE BLD MANUAL STRIP-MCNC: 101 MG/DL (ref 74–99)
GLUCOSE BLD MANUAL STRIP-MCNC: 106 MG/DL (ref 74–99)
GLUCOSE BLD MANUAL STRIP-MCNC: 107 MG/DL (ref 74–99)
GLUCOSE BLD MANUAL STRIP-MCNC: 109 MG/DL (ref 74–99)
GLUCOSE BLD MANUAL STRIP-MCNC: 110 MG/DL (ref 74–99)
GLUCOSE BLD MANUAL STRIP-MCNC: 112 MG/DL (ref 74–99)
GLUCOSE BLD MANUAL STRIP-MCNC: 113 MG/DL (ref 74–99)
GLUCOSE BLD MANUAL STRIP-MCNC: 114 MG/DL (ref 74–99)
GLUCOSE BLD MANUAL STRIP-MCNC: 115 MG/DL (ref 74–99)
GLUCOSE BLD MANUAL STRIP-MCNC: 118 MG/DL (ref 74–99)
GLUCOSE BLD MANUAL STRIP-MCNC: 121 MG/DL (ref 74–99)
GLUCOSE BLD MANUAL STRIP-MCNC: 121 MG/DL (ref 74–99)
GLUCOSE BLD MANUAL STRIP-MCNC: 122 MG/DL (ref 74–99)
GLUCOSE BLD MANUAL STRIP-MCNC: 124 MG/DL (ref 74–99)
GLUCOSE BLD MANUAL STRIP-MCNC: 125 MG/DL (ref 74–99)
GLUCOSE BLD MANUAL STRIP-MCNC: 125 MG/DL (ref 74–99)
GLUCOSE BLD MANUAL STRIP-MCNC: 126 MG/DL (ref 74–99)
GLUCOSE BLD MANUAL STRIP-MCNC: 127 MG/DL (ref 74–99)
GLUCOSE BLD MANUAL STRIP-MCNC: 127 MG/DL (ref 74–99)
GLUCOSE BLD MANUAL STRIP-MCNC: 129 MG/DL (ref 74–99)
GLUCOSE BLD MANUAL STRIP-MCNC: 130 MG/DL (ref 74–99)
GLUCOSE BLD MANUAL STRIP-MCNC: 131 MG/DL (ref 74–99)
GLUCOSE BLD MANUAL STRIP-MCNC: 133 MG/DL (ref 74–99)
GLUCOSE BLD MANUAL STRIP-MCNC: 135 MG/DL (ref 74–99)
GLUCOSE BLD MANUAL STRIP-MCNC: 137 MG/DL (ref 74–99)
GLUCOSE BLD MANUAL STRIP-MCNC: 138 MG/DL (ref 74–99)
GLUCOSE BLD MANUAL STRIP-MCNC: 139 MG/DL (ref 74–99)
GLUCOSE BLD MANUAL STRIP-MCNC: 139 MG/DL (ref 74–99)
GLUCOSE BLD MANUAL STRIP-MCNC: 140 MG/DL (ref 74–99)
GLUCOSE BLD MANUAL STRIP-MCNC: 141 MG/DL (ref 74–99)
GLUCOSE BLD MANUAL STRIP-MCNC: 142 MG/DL (ref 74–99)
GLUCOSE BLD MANUAL STRIP-MCNC: 143 MG/DL (ref 74–99)
GLUCOSE BLD MANUAL STRIP-MCNC: 143 MG/DL (ref 74–99)
GLUCOSE BLD MANUAL STRIP-MCNC: 144 MG/DL (ref 74–99)
GLUCOSE BLD MANUAL STRIP-MCNC: 145 MG/DL (ref 74–99)
GLUCOSE BLD MANUAL STRIP-MCNC: 146 MG/DL (ref 74–99)
GLUCOSE BLD MANUAL STRIP-MCNC: 146 MG/DL (ref 74–99)
GLUCOSE BLD MANUAL STRIP-MCNC: 147 MG/DL (ref 74–99)
GLUCOSE BLD MANUAL STRIP-MCNC: 148 MG/DL (ref 74–99)
GLUCOSE BLD MANUAL STRIP-MCNC: 149 MG/DL (ref 74–99)
GLUCOSE BLD MANUAL STRIP-MCNC: 150 MG/DL (ref 74–99)
GLUCOSE BLD MANUAL STRIP-MCNC: 150 MG/DL (ref 74–99)
GLUCOSE BLD MANUAL STRIP-MCNC: 151 MG/DL (ref 74–99)
GLUCOSE BLD MANUAL STRIP-MCNC: 151 MG/DL (ref 74–99)
GLUCOSE BLD MANUAL STRIP-MCNC: 152 MG/DL (ref 74–99)
GLUCOSE BLD MANUAL STRIP-MCNC: 153 MG/DL (ref 74–99)
GLUCOSE BLD MANUAL STRIP-MCNC: 153 MG/DL (ref 74–99)
GLUCOSE BLD MANUAL STRIP-MCNC: 154 MG/DL (ref 74–99)
GLUCOSE BLD MANUAL STRIP-MCNC: 154 MG/DL (ref 74–99)
GLUCOSE BLD MANUAL STRIP-MCNC: 155 MG/DL (ref 74–99)
GLUCOSE BLD MANUAL STRIP-MCNC: 156 MG/DL (ref 74–99)
GLUCOSE BLD MANUAL STRIP-MCNC: 157 MG/DL (ref 74–99)
GLUCOSE BLD MANUAL STRIP-MCNC: 158 MG/DL (ref 74–99)
GLUCOSE BLD MANUAL STRIP-MCNC: 159 MG/DL (ref 74–99)
GLUCOSE BLD MANUAL STRIP-MCNC: 160 MG/DL (ref 74–99)
GLUCOSE BLD MANUAL STRIP-MCNC: 161 MG/DL (ref 74–99)
GLUCOSE BLD MANUAL STRIP-MCNC: 162 MG/DL (ref 74–99)
GLUCOSE BLD MANUAL STRIP-MCNC: 162 MG/DL (ref 74–99)
GLUCOSE BLD MANUAL STRIP-MCNC: 163 MG/DL (ref 74–99)
GLUCOSE BLD MANUAL STRIP-MCNC: 164 MG/DL (ref 74–99)
GLUCOSE BLD MANUAL STRIP-MCNC: 164 MG/DL (ref 74–99)
GLUCOSE BLD MANUAL STRIP-MCNC: 165 MG/DL (ref 74–99)
GLUCOSE BLD MANUAL STRIP-MCNC: 166 MG/DL (ref 74–99)
GLUCOSE BLD MANUAL STRIP-MCNC: 167 MG/DL (ref 74–99)
GLUCOSE BLD MANUAL STRIP-MCNC: 167 MG/DL (ref 74–99)
GLUCOSE BLD MANUAL STRIP-MCNC: 169 MG/DL (ref 74–99)
GLUCOSE BLD MANUAL STRIP-MCNC: 170 MG/DL (ref 74–99)
GLUCOSE BLD MANUAL STRIP-MCNC: 171 MG/DL (ref 74–99)
GLUCOSE BLD MANUAL STRIP-MCNC: 173 MG/DL (ref 74–99)
GLUCOSE BLD MANUAL STRIP-MCNC: 173 MG/DL (ref 74–99)
GLUCOSE BLD MANUAL STRIP-MCNC: 174 MG/DL (ref 74–99)
GLUCOSE BLD MANUAL STRIP-MCNC: 175 MG/DL (ref 74–99)
GLUCOSE BLD MANUAL STRIP-MCNC: 176 MG/DL (ref 74–99)
GLUCOSE BLD MANUAL STRIP-MCNC: 177 MG/DL (ref 74–99)
GLUCOSE BLD MANUAL STRIP-MCNC: 177 MG/DL (ref 74–99)
GLUCOSE BLD MANUAL STRIP-MCNC: 178 MG/DL (ref 74–99)
GLUCOSE BLD MANUAL STRIP-MCNC: 179 MG/DL (ref 74–99)
GLUCOSE BLD MANUAL STRIP-MCNC: 180 MG/DL (ref 74–99)
GLUCOSE BLD MANUAL STRIP-MCNC: 181 MG/DL (ref 74–99)
GLUCOSE BLD MANUAL STRIP-MCNC: 181 MG/DL (ref 74–99)
GLUCOSE BLD MANUAL STRIP-MCNC: 182 MG/DL (ref 74–99)
GLUCOSE BLD MANUAL STRIP-MCNC: 182 MG/DL (ref 74–99)
GLUCOSE BLD MANUAL STRIP-MCNC: 183 MG/DL (ref 74–99)
GLUCOSE BLD MANUAL STRIP-MCNC: 184 MG/DL (ref 74–99)
GLUCOSE BLD MANUAL STRIP-MCNC: 185 MG/DL (ref 74–99)
GLUCOSE BLD MANUAL STRIP-MCNC: 186 MG/DL (ref 74–99)
GLUCOSE BLD MANUAL STRIP-MCNC: 186 MG/DL (ref 74–99)
GLUCOSE BLD MANUAL STRIP-MCNC: 187 MG/DL (ref 74–99)
GLUCOSE BLD MANUAL STRIP-MCNC: 188 MG/DL (ref 74–99)
GLUCOSE BLD MANUAL STRIP-MCNC: 189 MG/DL (ref 74–99)
GLUCOSE BLD MANUAL STRIP-MCNC: 190 MG/DL (ref 74–99)
GLUCOSE BLD MANUAL STRIP-MCNC: 190 MG/DL (ref 74–99)
GLUCOSE BLD MANUAL STRIP-MCNC: 191 MG/DL (ref 74–99)
GLUCOSE BLD MANUAL STRIP-MCNC: 196 MG/DL (ref 74–99)
GLUCOSE BLD MANUAL STRIP-MCNC: 197 MG/DL (ref 74–99)
GLUCOSE BLD MANUAL STRIP-MCNC: 199 MG/DL (ref 74–99)
GLUCOSE BLD MANUAL STRIP-MCNC: 199 MG/DL (ref 74–99)
GLUCOSE BLD MANUAL STRIP-MCNC: 200 MG/DL (ref 74–99)
GLUCOSE BLD MANUAL STRIP-MCNC: 203 MG/DL (ref 74–99)
GLUCOSE BLD MANUAL STRIP-MCNC: 204 MG/DL (ref 74–99)
GLUCOSE BLD MANUAL STRIP-MCNC: 204 MG/DL (ref 74–99)
GLUCOSE BLD MANUAL STRIP-MCNC: 205 MG/DL (ref 74–99)
GLUCOSE BLD MANUAL STRIP-MCNC: 205 MG/DL (ref 74–99)
GLUCOSE BLD MANUAL STRIP-MCNC: 206 MG/DL (ref 74–99)
GLUCOSE BLD MANUAL STRIP-MCNC: 207 MG/DL (ref 74–99)
GLUCOSE BLD MANUAL STRIP-MCNC: 208 MG/DL (ref 74–99)
GLUCOSE BLD MANUAL STRIP-MCNC: 209 MG/DL (ref 74–99)
GLUCOSE BLD MANUAL STRIP-MCNC: 209 MG/DL (ref 74–99)
GLUCOSE BLD MANUAL STRIP-MCNC: 213 MG/DL (ref 74–99)
GLUCOSE BLD MANUAL STRIP-MCNC: 214 MG/DL (ref 74–99)
GLUCOSE BLD MANUAL STRIP-MCNC: 214 MG/DL (ref 74–99)
GLUCOSE BLD MANUAL STRIP-MCNC: 215 MG/DL (ref 74–99)
GLUCOSE BLD MANUAL STRIP-MCNC: 215 MG/DL (ref 74–99)
GLUCOSE BLD MANUAL STRIP-MCNC: 220 MG/DL (ref 74–99)
GLUCOSE BLD MANUAL STRIP-MCNC: 222 MG/DL (ref 74–99)
GLUCOSE BLD MANUAL STRIP-MCNC: 223 MG/DL (ref 74–99)
GLUCOSE BLD MANUAL STRIP-MCNC: 224 MG/DL (ref 74–99)
GLUCOSE BLD MANUAL STRIP-MCNC: 225 MG/DL (ref 74–99)
GLUCOSE BLD MANUAL STRIP-MCNC: 226 MG/DL (ref 74–99)
GLUCOSE BLD MANUAL STRIP-MCNC: 226 MG/DL (ref 74–99)
GLUCOSE BLD MANUAL STRIP-MCNC: 228 MG/DL (ref 74–99)
GLUCOSE BLD MANUAL STRIP-MCNC: 229 MG/DL (ref 74–99)
GLUCOSE BLD MANUAL STRIP-MCNC: 230 MG/DL (ref 74–99)
GLUCOSE BLD MANUAL STRIP-MCNC: 235 MG/DL (ref 74–99)
GLUCOSE BLD MANUAL STRIP-MCNC: 235 MG/DL (ref 74–99)
GLUCOSE BLD MANUAL STRIP-MCNC: 239 MG/DL (ref 74–99)
GLUCOSE BLD MANUAL STRIP-MCNC: 239 MG/DL (ref 74–99)
GLUCOSE BLD MANUAL STRIP-MCNC: 253 MG/DL (ref 74–99)
GLUCOSE BLD MANUAL STRIP-MCNC: 254 MG/DL (ref 74–99)
GLUCOSE BLD MANUAL STRIP-MCNC: 261 MG/DL (ref 74–99)
GLUCOSE BLD MANUAL STRIP-MCNC: 268 MG/DL (ref 74–99)
GLUCOSE BLD MANUAL STRIP-MCNC: 270 MG/DL (ref 74–99)
GLUCOSE BLD MANUAL STRIP-MCNC: 281 MG/DL (ref 74–99)
GLUCOSE BLD MANUAL STRIP-MCNC: 284 MG/DL (ref 74–99)
GLUCOSE BLD MANUAL STRIP-MCNC: 305 MG/DL (ref 74–99)
GLUCOSE BLD MANUAL STRIP-MCNC: 307 MG/DL (ref 74–99)
GLUCOSE BLD MANUAL STRIP-MCNC: 308 MG/DL (ref 74–99)
GLUCOSE BLD MANUAL STRIP-MCNC: 62 MG/DL (ref 74–99)
GLUCOSE BLD MANUAL STRIP-MCNC: 71 MG/DL (ref 74–99)
GLUCOSE BLD MANUAL STRIP-MCNC: 79 MG/DL (ref 74–99)
GLUCOSE BLD MANUAL STRIP-MCNC: 85 MG/DL (ref 74–99)
GLUCOSE BLD MANUAL STRIP-MCNC: 90 MG/DL (ref 74–99)
GLUCOSE BLD MANUAL STRIP-MCNC: 92 MG/DL (ref 74–99)
GLUCOSE BLD-MCNC: 111 MG/DL (ref 74–99)
GLUCOSE BLD-MCNC: 113 MG/DL (ref 74–99)
GLUCOSE BLD-MCNC: 115 MG/DL (ref 74–99)
GLUCOSE BLD-MCNC: 121 MG/DL (ref 74–99)
GLUCOSE BLD-MCNC: 126 MG/DL (ref 74–99)
GLUCOSE BLD-MCNC: 128 MG/DL (ref 74–99)
GLUCOSE BLD-MCNC: 142 MG/DL (ref 74–99)
GLUCOSE BLD-MCNC: 151 MG/DL (ref 74–99)
GLUCOSE BLD-MCNC: 153 MG/DL (ref 74–99)
GLUCOSE BLD-MCNC: 155 MG/DL (ref 74–99)
GLUCOSE BLD-MCNC: 157 MG/DL (ref 74–99)
GLUCOSE BLD-MCNC: 165 MG/DL (ref 74–99)
GLUCOSE BLD-MCNC: 166 MG/DL (ref 74–99)
GLUCOSE BLD-MCNC: 167 MG/DL (ref 74–99)
GLUCOSE BLD-MCNC: 177 MG/DL (ref 74–99)
GLUCOSE BLD-MCNC: 178 MG/DL (ref 74–99)
GLUCOSE BLD-MCNC: 179 MG/DL (ref 74–99)
GLUCOSE BLD-MCNC: 189 MG/DL (ref 74–99)
GLUCOSE BLD-MCNC: 197 MG/DL (ref 74–99)
GLUCOSE BLD-MCNC: 205 MG/DL (ref 74–99)
GLUCOSE BLD-MCNC: 206 MG/DL (ref 74–99)
GLUCOSE BLD-MCNC: 207 MG/DL (ref 74–99)
GLUCOSE BLD-MCNC: 218 MG/DL (ref 74–99)
GLUCOSE BLD-MCNC: 221 MG/DL (ref 74–99)
GLUCOSE BLD-MCNC: 224 MG/DL (ref 74–99)
GLUCOSE BLD-MCNC: 228 MG/DL (ref 74–99)
GLUCOSE BLD-MCNC: 233 MG/DL (ref 74–99)
GLUCOSE BLD-MCNC: 237 MG/DL (ref 74–99)
GLUCOSE BLD-MCNC: 237 MG/DL (ref 74–99)
GLUCOSE BLD-MCNC: 238 MG/DL (ref 74–99)
GLUCOSE BLD-MCNC: 268 MG/DL (ref 74–99)
GLUCOSE BLD-MCNC: 271 MG/DL (ref 74–99)
GLUCOSE BLD-MCNC: 272 MG/DL (ref 74–99)
GLUCOSE BLD-MCNC: 296 MG/DL (ref 74–99)
GLUCOSE BLD-MCNC: 307 MG/DL (ref 74–99)
GLUCOSE BLD-MCNC: 83 MG/DL (ref 74–99)
GLUCOSE BLD-MCNC: 85 MG/DL (ref 74–99)
GLUCOSE BLDA-MCNC: 100 MG/DL (ref 74–99)
GLUCOSE BLDA-MCNC: 102 MG/DL (ref 74–99)
GLUCOSE BLDA-MCNC: 102 MG/DL (ref 74–99)
GLUCOSE BLDA-MCNC: 106 MG/DL (ref 74–99)
GLUCOSE BLDA-MCNC: 107 MG/DL (ref 74–99)
GLUCOSE BLDA-MCNC: 108 MG/DL (ref 74–99)
GLUCOSE BLDA-MCNC: 109 MG/DL (ref 74–99)
GLUCOSE BLDA-MCNC: 110 MG/DL (ref 74–99)
GLUCOSE BLDA-MCNC: 111 MG/DL (ref 74–99)
GLUCOSE BLDA-MCNC: 113 MG/DL (ref 74–99)
GLUCOSE BLDA-MCNC: 115 MG/DL (ref 74–99)
GLUCOSE BLDA-MCNC: 115 MG/DL (ref 74–99)
GLUCOSE BLDA-MCNC: 116 MG/DL (ref 74–99)
GLUCOSE BLDA-MCNC: 116 MG/DL (ref 74–99)
GLUCOSE BLDA-MCNC: 117 MG/DL (ref 74–99)
GLUCOSE BLDA-MCNC: 120 MG/DL (ref 74–99)
GLUCOSE BLDA-MCNC: 122 MG/DL (ref 74–99)
GLUCOSE BLDA-MCNC: 123 MG/DL (ref 74–99)
GLUCOSE BLDA-MCNC: 124 MG/DL (ref 74–99)
GLUCOSE BLDA-MCNC: 125 MG/DL (ref 74–99)
GLUCOSE BLDA-MCNC: 125 MG/DL (ref 74–99)
GLUCOSE BLDA-MCNC: 126 MG/DL (ref 74–99)
GLUCOSE BLDA-MCNC: 127 MG/DL (ref 74–99)
GLUCOSE BLDA-MCNC: 131 MG/DL (ref 74–99)
GLUCOSE BLDA-MCNC: 132 MG/DL (ref 74–99)
GLUCOSE BLDA-MCNC: 133 MG/DL (ref 74–99)
GLUCOSE BLDA-MCNC: 134 MG/DL (ref 74–99)
GLUCOSE BLDA-MCNC: 134 MG/DL (ref 74–99)
GLUCOSE BLDA-MCNC: 135 MG/DL (ref 74–99)
GLUCOSE BLDA-MCNC: 135 MG/DL (ref 74–99)
GLUCOSE BLDA-MCNC: 137 MG/DL (ref 74–99)
GLUCOSE BLDA-MCNC: 138 MG/DL (ref 74–99)
GLUCOSE BLDA-MCNC: 139 MG/DL (ref 74–99)
GLUCOSE BLDA-MCNC: 140 MG/DL (ref 74–99)
GLUCOSE BLDA-MCNC: 141 MG/DL (ref 74–99)
GLUCOSE BLDA-MCNC: 141 MG/DL (ref 74–99)
GLUCOSE BLDA-MCNC: 143 MG/DL (ref 74–99)
GLUCOSE BLDA-MCNC: 143 MG/DL (ref 74–99)
GLUCOSE BLDA-MCNC: 144 MG/DL (ref 74–99)
GLUCOSE BLDA-MCNC: 145 MG/DL (ref 74–99)
GLUCOSE BLDA-MCNC: 145 MG/DL (ref 74–99)
GLUCOSE BLDA-MCNC: 146 MG/DL (ref 74–99)
GLUCOSE BLDA-MCNC: 148 MG/DL (ref 74–99)
GLUCOSE BLDA-MCNC: 150 MG/DL (ref 74–99)
GLUCOSE BLDA-MCNC: 152 MG/DL (ref 74–99)
GLUCOSE BLDA-MCNC: 153 MG/DL (ref 74–99)
GLUCOSE BLDA-MCNC: 155 MG/DL (ref 74–99)
GLUCOSE BLDA-MCNC: 157 MG/DL (ref 74–99)
GLUCOSE BLDA-MCNC: 158 MG/DL (ref 74–99)
GLUCOSE BLDA-MCNC: 158 MG/DL (ref 74–99)
GLUCOSE BLDA-MCNC: 159 MG/DL (ref 74–99)
GLUCOSE BLDA-MCNC: 159 MG/DL (ref 74–99)
GLUCOSE BLDA-MCNC: 160 MG/DL (ref 74–99)
GLUCOSE BLDA-MCNC: 160 MG/DL (ref 74–99)
GLUCOSE BLDA-MCNC: 161 MG/DL (ref 74–99)
GLUCOSE BLDA-MCNC: 162 MG/DL (ref 74–99)
GLUCOSE BLDA-MCNC: 162 MG/DL (ref 74–99)
GLUCOSE BLDA-MCNC: 163 MG/DL (ref 74–99)
GLUCOSE BLDA-MCNC: 164 MG/DL (ref 74–99)
GLUCOSE BLDA-MCNC: 164 MG/DL (ref 74–99)
GLUCOSE BLDA-MCNC: 165 MG/DL (ref 74–99)
GLUCOSE BLDA-MCNC: 166 MG/DL (ref 74–99)
GLUCOSE BLDA-MCNC: 166 MG/DL (ref 74–99)
GLUCOSE BLDA-MCNC: 167 MG/DL (ref 74–99)
GLUCOSE BLDA-MCNC: 168 MG/DL (ref 74–99)
GLUCOSE BLDA-MCNC: 169 MG/DL (ref 74–99)
GLUCOSE BLDA-MCNC: 171 MG/DL (ref 74–99)
GLUCOSE BLDA-MCNC: 172 MG/DL (ref 74–99)
GLUCOSE BLDA-MCNC: 172 MG/DL (ref 74–99)
GLUCOSE BLDA-MCNC: 173 MG/DL (ref 74–99)
GLUCOSE BLDA-MCNC: 174 MG/DL (ref 74–99)
GLUCOSE BLDA-MCNC: 175 MG/DL (ref 74–99)
GLUCOSE BLDA-MCNC: 176 MG/DL (ref 74–99)
GLUCOSE BLDA-MCNC: 176 MG/DL (ref 74–99)
GLUCOSE BLDA-MCNC: 177 MG/DL (ref 74–99)
GLUCOSE BLDA-MCNC: 178 MG/DL (ref 74–99)
GLUCOSE BLDA-MCNC: 179 MG/DL (ref 74–99)
GLUCOSE BLDA-MCNC: 181 MG/DL (ref 74–99)
GLUCOSE BLDA-MCNC: 182 MG/DL (ref 74–99)
GLUCOSE BLDA-MCNC: 182 MG/DL (ref 74–99)
GLUCOSE BLDA-MCNC: 183 MG/DL (ref 74–99)
GLUCOSE BLDA-MCNC: 185 MG/DL (ref 74–99)
GLUCOSE BLDA-MCNC: 186 MG/DL (ref 74–99)
GLUCOSE BLDA-MCNC: 187 MG/DL (ref 74–99)
GLUCOSE BLDA-MCNC: 190 MG/DL (ref 74–99)
GLUCOSE BLDA-MCNC: 191 MG/DL (ref 74–99)
GLUCOSE BLDA-MCNC: 192 MG/DL (ref 74–99)
GLUCOSE BLDA-MCNC: 194 MG/DL (ref 74–99)
GLUCOSE BLDA-MCNC: 194 MG/DL (ref 74–99)
GLUCOSE BLDA-MCNC: 195 MG/DL (ref 74–99)
GLUCOSE BLDA-MCNC: 196 MG/DL (ref 74–99)
GLUCOSE BLDA-MCNC: 196 MG/DL (ref 74–99)
GLUCOSE BLDA-MCNC: 197 MG/DL (ref 74–99)
GLUCOSE BLDA-MCNC: 198 MG/DL (ref 74–99)
GLUCOSE BLDA-MCNC: 199 MG/DL (ref 74–99)
GLUCOSE BLDA-MCNC: 200 MG/DL (ref 74–99)
GLUCOSE BLDA-MCNC: 203 MG/DL (ref 74–99)
GLUCOSE BLDA-MCNC: 203 MG/DL (ref 74–99)
GLUCOSE BLDA-MCNC: 204 MG/DL (ref 74–99)
GLUCOSE BLDA-MCNC: 205 MG/DL (ref 74–99)
GLUCOSE BLDA-MCNC: 207 MG/DL (ref 74–99)
GLUCOSE BLDA-MCNC: 208 MG/DL (ref 74–99)
GLUCOSE BLDA-MCNC: 212 MG/DL (ref 74–99)
GLUCOSE BLDA-MCNC: 213 MG/DL (ref 74–99)
GLUCOSE BLDA-MCNC: 216 MG/DL (ref 74–99)
GLUCOSE BLDA-MCNC: 217 MG/DL (ref 74–99)
GLUCOSE BLDA-MCNC: 220 MG/DL (ref 74–99)
GLUCOSE BLDA-MCNC: 220 MG/DL (ref 74–99)
GLUCOSE BLDA-MCNC: 225 MG/DL (ref 74–99)
GLUCOSE BLDA-MCNC: 232 MG/DL (ref 74–99)
GLUCOSE BLDA-MCNC: 238 MG/DL (ref 74–99)
GLUCOSE BLDA-MCNC: 238 MG/DL (ref 74–99)
GLUCOSE BLDA-MCNC: 239 MG/DL (ref 74–99)
GLUCOSE BLDA-MCNC: 254 MG/DL (ref 74–99)
GLUCOSE BLDA-MCNC: 271 MG/DL (ref 74–99)
GLUCOSE BLDA-MCNC: 272 MG/DL (ref 74–99)
GLUCOSE BLDA-MCNC: 299 MG/DL (ref 74–99)
GLUCOSE BLDA-MCNC: 309 MG/DL (ref 74–99)
GLUCOSE BLDA-MCNC: 327 MG/DL (ref 74–99)
GLUCOSE BLDA-MCNC: 367 MG/DL (ref 74–99)
GLUCOSE BLDA-MCNC: 374 MG/DL (ref 74–99)
GLUCOSE BLDA-MCNC: 56 MG/DL (ref 74–99)
GLUCOSE BLDA-MCNC: 72 MG/DL (ref 74–99)
GLUCOSE BLDA-MCNC: 73 MG/DL (ref 74–99)
GLUCOSE BLDA-MCNC: 75 MG/DL (ref 74–99)
GLUCOSE BLDA-MCNC: 91 MG/DL (ref 74–99)
GLUCOSE BLDA-MCNC: 95 MG/DL (ref 74–99)
GLUCOSE BLDA-MCNC: 98 MG/DL (ref 74–99)
GLUCOSE BLDA-MCNC: ABNORMAL MG/DL
GLUCOSE BLDV-MCNC: 102 MG/DL (ref 74–99)
GLUCOSE BLDV-MCNC: 106 MG/DL (ref 74–99)
GLUCOSE BLDV-MCNC: 113 MG/DL (ref 74–99)
GLUCOSE BLDV-MCNC: 118 MG/DL (ref 74–99)
GLUCOSE BLDV-MCNC: 131 MG/DL (ref 74–99)
GLUCOSE BLDV-MCNC: 140 MG/DL (ref 74–99)
GLUCOSE BLDV-MCNC: 146 MG/DL (ref 74–99)
GLUCOSE BLDV-MCNC: 146 MG/DL (ref 74–99)
GLUCOSE BLDV-MCNC: 148 MG/DL (ref 74–99)
GLUCOSE BLDV-MCNC: 151 MG/DL (ref 74–99)
GLUCOSE BLDV-MCNC: 152 MG/DL (ref 74–99)
GLUCOSE BLDV-MCNC: 155 MG/DL (ref 74–99)
GLUCOSE BLDV-MCNC: 159 MG/DL (ref 74–99)
GLUCOSE BLDV-MCNC: 165 MG/DL (ref 74–99)
GLUCOSE BLDV-MCNC: 166 MG/DL (ref 74–99)
GLUCOSE BLDV-MCNC: 168 MG/DL (ref 74–99)
GLUCOSE BLDV-MCNC: 175 MG/DL (ref 74–99)
GLUCOSE BLDV-MCNC: 186 MG/DL (ref 74–99)
GLUCOSE BLDV-MCNC: 186 MG/DL (ref 74–99)
GLUCOSE BLDV-MCNC: 198 MG/DL (ref 74–99)
GLUCOSE BLDV-MCNC: 206 MG/DL (ref 74–99)
GLUCOSE BLDV-MCNC: 223 MG/DL (ref 74–99)
GLUCOSE BLDV-MCNC: 232 MG/DL (ref 74–99)
GLUCOSE BLDV-MCNC: 246 MG/DL (ref 74–99)
GLUCOSE BLDV-MCNC: 334 MG/DL (ref 74–99)
GLUCOSE BLDV-MCNC: 78 MG/DL (ref 74–99)
GLUCOSE BLDV-MCNC: 90 MG/DL (ref 74–99)
GLUCOSE SERPL-MCNC: 100 MG/DL (ref 74–99)
GLUCOSE SERPL-MCNC: 101 MG/DL (ref 74–99)
GLUCOSE SERPL-MCNC: 101 MG/DL (ref 74–99)
GLUCOSE SERPL-MCNC: 104 MG/DL (ref 74–99)
GLUCOSE SERPL-MCNC: 106 MG/DL (ref 74–99)
GLUCOSE SERPL-MCNC: 107 MG/DL (ref 74–99)
GLUCOSE SERPL-MCNC: 108 MG/DL (ref 74–99)
GLUCOSE SERPL-MCNC: 109 MG/DL (ref 74–99)
GLUCOSE SERPL-MCNC: 112 MG/DL (ref 74–99)
GLUCOSE SERPL-MCNC: 113 MG/DL (ref 74–99)
GLUCOSE SERPL-MCNC: 114 MG/DL (ref 74–99)
GLUCOSE SERPL-MCNC: 115 MG/DL (ref 74–99)
GLUCOSE SERPL-MCNC: 115 MG/DL (ref 74–99)
GLUCOSE SERPL-MCNC: 118 MG/DL (ref 74–99)
GLUCOSE SERPL-MCNC: 120 MG/DL (ref 74–99)
GLUCOSE SERPL-MCNC: 121 MG/DL (ref 74–99)
GLUCOSE SERPL-MCNC: 121 MG/DL (ref 74–99)
GLUCOSE SERPL-MCNC: 122 MG/DL (ref 74–99)
GLUCOSE SERPL-MCNC: 122 MG/DL (ref 74–99)
GLUCOSE SERPL-MCNC: 124 MG/DL (ref 74–99)
GLUCOSE SERPL-MCNC: 125 MG/DL (ref 74–99)
GLUCOSE SERPL-MCNC: 128 MG/DL (ref 74–99)
GLUCOSE SERPL-MCNC: 128 MG/DL (ref 74–99)
GLUCOSE SERPL-MCNC: 129 MG/DL (ref 74–99)
GLUCOSE SERPL-MCNC: 130 MG/DL (ref 74–99)
GLUCOSE SERPL-MCNC: 131 MG/DL (ref 74–99)
GLUCOSE SERPL-MCNC: 132 MG/DL (ref 74–99)
GLUCOSE SERPL-MCNC: 134 MG/DL (ref 74–99)
GLUCOSE SERPL-MCNC: 135 MG/DL (ref 74–99)
GLUCOSE SERPL-MCNC: 136 MG/DL (ref 74–99)
GLUCOSE SERPL-MCNC: 137 MG/DL (ref 74–99)
GLUCOSE SERPL-MCNC: 137 MG/DL (ref 74–99)
GLUCOSE SERPL-MCNC: 138 MG/DL (ref 74–99)
GLUCOSE SERPL-MCNC: 140 MG/DL (ref 74–99)
GLUCOSE SERPL-MCNC: 141 MG/DL (ref 74–99)
GLUCOSE SERPL-MCNC: 141 MG/DL (ref 74–99)
GLUCOSE SERPL-MCNC: 143 MG/DL (ref 74–99)
GLUCOSE SERPL-MCNC: 144 MG/DL (ref 74–99)
GLUCOSE SERPL-MCNC: 145 MG/DL (ref 74–99)
GLUCOSE SERPL-MCNC: 145 MG/DL (ref 74–99)
GLUCOSE SERPL-MCNC: 146 MG/DL (ref 74–99)
GLUCOSE SERPL-MCNC: 147 MG/DL (ref 74–99)
GLUCOSE SERPL-MCNC: 149 MG/DL (ref 74–99)
GLUCOSE SERPL-MCNC: 150 MG/DL (ref 74–99)
GLUCOSE SERPL-MCNC: 151 MG/DL (ref 74–99)
GLUCOSE SERPL-MCNC: 153 MG/DL (ref 74–99)
GLUCOSE SERPL-MCNC: 153 MG/DL (ref 74–99)
GLUCOSE SERPL-MCNC: 154 MG/DL (ref 74–99)
GLUCOSE SERPL-MCNC: 154 MG/DL (ref 74–99)
GLUCOSE SERPL-MCNC: 155 MG/DL (ref 74–99)
GLUCOSE SERPL-MCNC: 155 MG/DL (ref 74–99)
GLUCOSE SERPL-MCNC: 156 MG/DL (ref 74–99)
GLUCOSE SERPL-MCNC: 157 MG/DL (ref 74–99)
GLUCOSE SERPL-MCNC: 157 MG/DL (ref 74–99)
GLUCOSE SERPL-MCNC: 158 MG/DL (ref 74–99)
GLUCOSE SERPL-MCNC: 158 MG/DL (ref 74–99)
GLUCOSE SERPL-MCNC: 164 MG/DL (ref 74–99)
GLUCOSE SERPL-MCNC: 165 MG/DL (ref 74–99)
GLUCOSE SERPL-MCNC: 166 MG/DL (ref 74–99)
GLUCOSE SERPL-MCNC: 166 MG/DL (ref 74–99)
GLUCOSE SERPL-MCNC: 167 MG/DL (ref 74–99)
GLUCOSE SERPL-MCNC: 168 MG/DL (ref 74–99)
GLUCOSE SERPL-MCNC: 168 MG/DL (ref 74–99)
GLUCOSE SERPL-MCNC: 169 MG/DL (ref 74–99)
GLUCOSE SERPL-MCNC: 169 MG/DL (ref 74–99)
GLUCOSE SERPL-MCNC: 170 MG/DL (ref 74–99)
GLUCOSE SERPL-MCNC: 171 MG/DL (ref 74–99)
GLUCOSE SERPL-MCNC: 171 MG/DL (ref 74–99)
GLUCOSE SERPL-MCNC: 173 MG/DL (ref 74–99)
GLUCOSE SERPL-MCNC: 174 MG/DL (ref 74–99)
GLUCOSE SERPL-MCNC: 177 MG/DL (ref 74–99)
GLUCOSE SERPL-MCNC: 177 MG/DL (ref 74–99)
GLUCOSE SERPL-MCNC: 178 MG/DL (ref 74–99)
GLUCOSE SERPL-MCNC: 178 MG/DL (ref 74–99)
GLUCOSE SERPL-MCNC: 179 MG/DL (ref 74–99)
GLUCOSE SERPL-MCNC: 181 MG/DL (ref 74–99)
GLUCOSE SERPL-MCNC: 182 MG/DL (ref 74–99)
GLUCOSE SERPL-MCNC: 182 MG/DL (ref 74–99)
GLUCOSE SERPL-MCNC: 185 MG/DL (ref 74–99)
GLUCOSE SERPL-MCNC: 186 MG/DL (ref 74–99)
GLUCOSE SERPL-MCNC: 187 MG/DL (ref 74–99)
GLUCOSE SERPL-MCNC: 187 MG/DL (ref 74–99)
GLUCOSE SERPL-MCNC: 188 MG/DL (ref 74–99)
GLUCOSE SERPL-MCNC: 189 MG/DL (ref 74–99)
GLUCOSE SERPL-MCNC: 193 MG/DL (ref 74–99)
GLUCOSE SERPL-MCNC: 193 MG/DL (ref 74–99)
GLUCOSE SERPL-MCNC: 199 MG/DL (ref 74–99)
GLUCOSE SERPL-MCNC: 200 MG/DL (ref 74–99)
GLUCOSE SERPL-MCNC: 201 MG/DL (ref 74–99)
GLUCOSE SERPL-MCNC: 202 MG/DL (ref 74–99)
GLUCOSE SERPL-MCNC: 203 MG/DL (ref 74–99)
GLUCOSE SERPL-MCNC: 204 MG/DL (ref 74–99)
GLUCOSE SERPL-MCNC: 207 MG/DL (ref 74–99)
GLUCOSE SERPL-MCNC: 208 MG/DL (ref 74–99)
GLUCOSE SERPL-MCNC: 215 MG/DL (ref 74–99)
GLUCOSE SERPL-MCNC: 217 MG/DL (ref 74–99)
GLUCOSE SERPL-MCNC: 218 MG/DL (ref 74–99)
GLUCOSE SERPL-MCNC: 218 MG/DL (ref 74–99)
GLUCOSE SERPL-MCNC: 221 MG/DL (ref 74–99)
GLUCOSE SERPL-MCNC: 223 MG/DL (ref 74–99)
GLUCOSE SERPL-MCNC: 230 MG/DL (ref 74–99)
GLUCOSE SERPL-MCNC: 233 MG/DL (ref 74–99)
GLUCOSE SERPL-MCNC: 233 MG/DL (ref 74–99)
GLUCOSE SERPL-MCNC: 241 MG/DL (ref 74–99)
GLUCOSE SERPL-MCNC: 241 MG/DL (ref 74–99)
GLUCOSE SERPL-MCNC: 242 MG/DL (ref 74–99)
GLUCOSE SERPL-MCNC: 243 MG/DL (ref 74–99)
GLUCOSE SERPL-MCNC: 263 MG/DL (ref 74–99)
GLUCOSE SERPL-MCNC: 268 MG/DL (ref 74–99)
GLUCOSE SERPL-MCNC: 300 MG/DL (ref 74–99)
GLUCOSE SERPL-MCNC: 310 MG/DL (ref 74–99)
GLUCOSE SERPL-MCNC: 344 MG/DL (ref 74–99)
GLUCOSE SERPL-MCNC: 63 MG/DL (ref 74–99)
GLUCOSE SERPL-MCNC: 75 MG/DL (ref 74–99)
GLUCOSE SERPL-MCNC: 777 MG/DL (ref 74–99)
GLUCOSE SERPL-MCNC: 81 MG/DL (ref 74–99)
GLUCOSE SERPL-MCNC: 82 MG/DL (ref 74–99)
GLUCOSE SERPL-MCNC: 92 MG/DL (ref 74–99)
GLUCOSE SERPL-MCNC: 94 MG/DL (ref 74–99)
GLUCOSE SERPL-MCNC: 95 MG/DL (ref 74–99)
GLUCOSE UR STRIP.AUTO-MCNC: ABNORMAL MG/DL
GLUCOSE UR STRIP.AUTO-MCNC: ABNORMAL MG/DL
GLUCOSE UR STRIP.AUTO-MCNC: NORMAL MG/DL
GLUCOSE UR STRIP.AUTO-MCNC: NORMAL MG/DL
GRAM STN SPEC: ABNORMAL
GRAM STN SPEC: ABNORMAL
GRAM STN SPEC: NORMAL
H PYLORI AG STL QL IA: NEGATIVE
HAPTOGLOB SERPL-MCNC: 12 MG/DL (ref 37–246)
HAPTOGLOB SERPL-MCNC: 12 MG/DL (ref 37–246)
HAPTOGLOB SERPL-MCNC: <10 MG/DL (ref 37–246)
HAV AB SER QL IA: REACTIVE
HBA1C MFR BLD: 5.7 %
HBA1C MFR BLD: 6.3 %
HBV CORE AB SER QL: NONREACTIVE
HBV CORE AB SER QL: NONREACTIVE
HBV SURFACE AB SER-ACNC: 319.1 MIU/ML
HBV SURFACE AB SER-ACNC: 730.3 MIU/ML
HBV SURFACE AB SER-ACNC: >1000 MIU/ML
HBV SURFACE AG SERPL QL IA: NONREACTIVE
HCO3 BLDA-SCNC: 15.2 MMOL/L (ref 22–26)
HCO3 BLDA-SCNC: 15.5 MMOL/L
HCO3 BLDA-SCNC: 15.8 MMOL/L (ref 22–26)
HCO3 BLDA-SCNC: 15.8 MMOL/L (ref 22–26)
HCO3 BLDA-SCNC: 16 MMOL/L (ref 22–26)
HCO3 BLDA-SCNC: 16.2 MMOL/L (ref 22–26)
HCO3 BLDA-SCNC: 16.7 MMOL/L (ref 22–26)
HCO3 BLDA-SCNC: 16.9 MMOL/L (ref 22–26)
HCO3 BLDA-SCNC: 17 MMOL/L (ref 22–26)
HCO3 BLDA-SCNC: 17.1 MMOL/L (ref 22–26)
HCO3 BLDA-SCNC: 17.6 MMOL/L (ref 22–26)
HCO3 BLDA-SCNC: 17.7 MMOL/L (ref 22–26)
HCO3 BLDA-SCNC: 18.2 MMOL/L
HCO3 BLDA-SCNC: 19.2 MMOL/L (ref 22–26)
HCO3 BLDA-SCNC: 19.5 MMOL/L (ref 22–26)
HCO3 BLDA-SCNC: 19.6 MMOL/L (ref 22–26)
HCO3 BLDA-SCNC: 19.7 MMOL/L (ref 22–26)
HCO3 BLDA-SCNC: 19.7 MMOL/L (ref 22–26)
HCO3 BLDA-SCNC: 20.1 MMOL/L (ref 22–26)
HCO3 BLDA-SCNC: 20.1 MMOL/L (ref 22–26)
HCO3 BLDA-SCNC: 20.3 MMOL/L (ref 22–26)
HCO3 BLDA-SCNC: 20.4 MMOL/L (ref 22–26)
HCO3 BLDA-SCNC: 20.5 MMOL/L (ref 22–26)
HCO3 BLDA-SCNC: 20.6 MMOL/L (ref 22–26)
HCO3 BLDA-SCNC: 20.7 MMOL/L (ref 22–26)
HCO3 BLDA-SCNC: 20.7 MMOL/L (ref 22–26)
HCO3 BLDA-SCNC: 20.8 MMOL/L
HCO3 BLDA-SCNC: 20.8 MMOL/L (ref 22–26)
HCO3 BLDA-SCNC: 21.1 MMOL/L (ref 22–26)
HCO3 BLDA-SCNC: 21.2 MMOL/L (ref 22–26)
HCO3 BLDA-SCNC: 21.2 MMOL/L (ref 22–26)
HCO3 BLDA-SCNC: 21.4 MMOL/L (ref 22–26)
HCO3 BLDA-SCNC: 21.5 MMOL/L
HCO3 BLDA-SCNC: 21.5 MMOL/L (ref 22–26)
HCO3 BLDA-SCNC: 21.6 MMOL/L (ref 22–26)
HCO3 BLDA-SCNC: 21.8 MMOL/L (ref 22–26)
HCO3 BLDA-SCNC: 21.9 MMOL/L (ref 22–26)
HCO3 BLDA-SCNC: 22 MMOL/L (ref 22–26)
HCO3 BLDA-SCNC: 22.1 MMOL/L (ref 22–26)
HCO3 BLDA-SCNC: 22.2 MMOL/L (ref 22–26)
HCO3 BLDA-SCNC: 22.5 MMOL/L (ref 22–26)
HCO3 BLDA-SCNC: 22.5 MMOL/L (ref 22–26)
HCO3 BLDA-SCNC: 22.6 MMOL/L
HCO3 BLDA-SCNC: 22.6 MMOL/L (ref 22–26)
HCO3 BLDA-SCNC: 22.7 MMOL/L (ref 22–26)
HCO3 BLDA-SCNC: 22.8 MMOL/L (ref 22–26)
HCO3 BLDA-SCNC: 22.8 MMOL/L (ref 22–26)
HCO3 BLDA-SCNC: 22.9 MMOL/L (ref 22–26)
HCO3 BLDA-SCNC: 23 MMOL/L
HCO3 BLDA-SCNC: 23 MMOL/L (ref 22–26)
HCO3 BLDA-SCNC: 23.1 MMOL/L (ref 22–26)
HCO3 BLDA-SCNC: 23.2 MMOL/L (ref 22–26)
HCO3 BLDA-SCNC: 23.4 MMOL/L
HCO3 BLDA-SCNC: 23.4 MMOL/L (ref 22–26)
HCO3 BLDA-SCNC: 23.5 MMOL/L (ref 22–26)
HCO3 BLDA-SCNC: 23.6 MMOL/L (ref 22–26)
HCO3 BLDA-SCNC: 23.7 MMOL/L
HCO3 BLDA-SCNC: 23.7 MMOL/L (ref 22–26)
HCO3 BLDA-SCNC: 23.8 MMOL/L (ref 22–26)
HCO3 BLDA-SCNC: 23.8 MMOL/L (ref 22–26)
HCO3 BLDA-SCNC: 23.9 MMOL/L (ref 22–26)
HCO3 BLDA-SCNC: 24 MMOL/L (ref 22–26)
HCO3 BLDA-SCNC: 24.1 MMOL/L
HCO3 BLDA-SCNC: 24.1 MMOL/L (ref 22–26)
HCO3 BLDA-SCNC: 24.2 MMOL/L
HCO3 BLDA-SCNC: 24.2 MMOL/L (ref 22–26)
HCO3 BLDA-SCNC: 24.2 MMOL/L (ref 22–26)
HCO3 BLDA-SCNC: 24.3 MMOL/L
HCO3 BLDA-SCNC: 24.3 MMOL/L (ref 22–26)
HCO3 BLDA-SCNC: 24.4 MMOL/L
HCO3 BLDA-SCNC: 24.5 MMOL/L (ref 22–26)
HCO3 BLDA-SCNC: 24.6 MMOL/L
HCO3 BLDA-SCNC: 24.6 MMOL/L (ref 22–26)
HCO3 BLDA-SCNC: 24.8 MMOL/L
HCO3 BLDA-SCNC: 24.8 MMOL/L (ref 22–26)
HCO3 BLDA-SCNC: 24.8 MMOL/L (ref 22–26)
HCO3 BLDA-SCNC: 24.9 MMOL/L
HCO3 BLDA-SCNC: 24.9 MMOL/L (ref 22–26)
HCO3 BLDA-SCNC: 25 MMOL/L
HCO3 BLDA-SCNC: 25.2 MMOL/L
HCO3 BLDA-SCNC: 25.2 MMOL/L (ref 22–26)
HCO3 BLDA-SCNC: 25.4 MMOL/L
HCO3 BLDA-SCNC: 25.4 MMOL/L (ref 22–26)
HCO3 BLDA-SCNC: 25.4 MMOL/L (ref 22–26)
HCO3 BLDA-SCNC: 25.5 MMOL/L
HCO3 BLDA-SCNC: 25.5 MMOL/L
HCO3 BLDA-SCNC: 25.5 MMOL/L (ref 22–26)
HCO3 BLDA-SCNC: 25.5 MMOL/L (ref 22–26)
HCO3 BLDA-SCNC: 25.6 MMOL/L (ref 22–26)
HCO3 BLDA-SCNC: 25.7 MMOL/L (ref 22–26)
HCO3 BLDA-SCNC: 25.8 MMOL/L (ref 22–26)
HCO3 BLDA-SCNC: 25.9 MMOL/L (ref 22–26)
HCO3 BLDA-SCNC: 25.9 MMOL/L (ref 22–26)
HCO3 BLDA-SCNC: 26 MMOL/L
HCO3 BLDA-SCNC: 26 MMOL/L (ref 22–26)
HCO3 BLDA-SCNC: 26.2 MMOL/L
HCO3 BLDA-SCNC: 26.3 MMOL/L (ref 22–26)
HCO3 BLDA-SCNC: 26.4 MMOL/L (ref 22–26)
HCO3 BLDA-SCNC: 26.5 MMOL/L (ref 22–26)
HCO3 BLDA-SCNC: 26.6 MMOL/L
HCO3 BLDA-SCNC: 26.6 MMOL/L (ref 22–26)
HCO3 BLDA-SCNC: 26.9 MMOL/L
HCO3 BLDA-SCNC: 27 MMOL/L
HCO3 BLDA-SCNC: 27 MMOL/L (ref 22–26)
HCO3 BLDA-SCNC: 27.3 MMOL/L
HCO3 BLDA-SCNC: 27.4 MMOL/L
HCO3 BLDA-SCNC: 27.5 MMOL/L
HCO3 BLDA-SCNC: 27.7 MMOL/L (ref 22–26)
HCO3 BLDA-SCNC: 27.8 MMOL/L (ref 22–26)
HCO3 BLDA-SCNC: 27.8 MMOL/L (ref 22–26)
HCO3 BLDA-SCNC: 27.9 MMOL/L (ref 22–26)
HCO3 BLDA-SCNC: 28 MMOL/L
HCO3 BLDA-SCNC: 28.2 MMOL/L
HCO3 BLDA-SCNC: 28.5 MMOL/L
HCO3 BLDA-SCNC: 28.5 MMOL/L
HCO3 BLDA-SCNC: 28.5 MMOL/L (ref 22–26)
HCO3 BLDA-SCNC: 28.7 MMOL/L
HCO3 BLDA-SCNC: 28.7 MMOL/L (ref 22–26)
HCO3 BLDA-SCNC: 28.7 MMOL/L (ref 22–26)
HCO3 BLDA-SCNC: 29.1 MMOL/L
HCO3 BLDA-SCNC: 29.2 MMOL/L (ref 22–26)
HCO3 BLDA-SCNC: 29.4 MMOL/L
HCO3 BLDA-SCNC: 29.5 MMOL/L
HCO3 BLDA-SCNC: 29.8 MMOL/L
HCO3 BLDA-SCNC: 29.9 MMOL/L (ref 22–26)
HCO3 BLDA-SCNC: 32.9 MMOL/L
HCO3 BLDA-SCNC: 33.1 MMOL/L (ref 22–26)
HCO3 BLDMV-SCNC: 16 MMOL/L (ref 22–26)
HCO3 BLDMV-SCNC: 17.7 MMOL/L (ref 22–26)
HCO3 BLDMV-SCNC: 18.2 MMOL/L (ref 22–26)
HCO3 BLDMV-SCNC: 18.7 MMOL/L (ref 22–26)
HCO3 BLDMV-SCNC: 19.1 MMOL/L (ref 22–26)
HCO3 BLDMV-SCNC: 19.4 MMOL/L (ref 22–26)
HCO3 BLDMV-SCNC: 20 MMOL/L (ref 22–26)
HCO3 BLDMV-SCNC: 20 MMOL/L (ref 22–26)
HCO3 BLDMV-SCNC: 20.4 MMOL/L (ref 22–26)
HCO3 BLDMV-SCNC: 20.6 MMOL/L (ref 22–26)
HCO3 BLDMV-SCNC: 20.9 MMOL/L (ref 22–26)
HCO3 BLDMV-SCNC: 20.9 MMOL/L (ref 22–26)
HCO3 BLDMV-SCNC: 21.6 MMOL/L (ref 22–26)
HCO3 BLDMV-SCNC: 21.6 MMOL/L (ref 22–26)
HCO3 BLDMV-SCNC: 21.8 MMOL/L (ref 22–26)
HCO3 BLDMV-SCNC: 21.9 MMOL/L (ref 22–26)
HCO3 BLDMV-SCNC: 22.1 MMOL/L (ref 22–26)
HCO3 BLDMV-SCNC: 22.5 MMOL/L (ref 22–26)
HCO3 BLDMV-SCNC: 22.5 MMOL/L (ref 22–26)
HCO3 BLDMV-SCNC: 22.9 MMOL/L (ref 22–26)
HCO3 BLDMV-SCNC: 23.5 MMOL/L (ref 22–26)
HCO3 BLDMV-SCNC: 23.6 MMOL/L (ref 22–26)
HCO3 BLDMV-SCNC: 24.2 MMOL/L (ref 22–26)
HCO3 BLDMV-SCNC: 24.2 MMOL/L (ref 22–26)
HCO3 BLDMV-SCNC: 25 MMOL/L (ref 22–26)
HCO3 BLDMV-SCNC: 25.3 MMOL/L (ref 22–26)
HCO3 BLDMV-SCNC: 25.3 MMOL/L (ref 22–26)
HCO3 BLDMV-SCNC: 25.8 MMOL/L (ref 22–26)
HCO3 BLDMV-SCNC: 25.8 MMOL/L (ref 22–26)
HCO3 BLDMV-SCNC: 25.9 MMOL/L (ref 22–26)
HCO3 BLDMV-SCNC: 26 MMOL/L (ref 22–26)
HCO3 BLDMV-SCNC: 26.1 MMOL/L (ref 22–26)
HCO3 BLDMV-SCNC: 26.3 MMOL/L (ref 22–26)
HCO3 BLDMV-SCNC: 26.3 MMOL/L (ref 22–26)
HCO3 BLDMV-SCNC: 26.4 MMOL/L (ref 22–26)
HCO3 BLDMV-SCNC: 26.5 MMOL/L (ref 22–26)
HCO3 BLDMV-SCNC: 27.1 MMOL/L (ref 22–26)
HCO3 BLDMV-SCNC: 27.2 MMOL/L (ref 22–26)
HCO3 BLDMV-SCNC: 27.7 MMOL/L (ref 22–26)
HCO3 BLDMV-SCNC: 27.9 MMOL/L (ref 22–26)
HCO3 BLDMV-SCNC: 28.3 MMOL/L (ref 22–26)
HCO3 BLDMV-SCNC: 28.5 MMOL/L (ref 22–26)
HCO3 BLDMV-SCNC: 30.5 MMOL/L (ref 22–26)
HCO3 BLDV-SCNC: 17.1 MMOL/L
HCO3 BLDV-SCNC: 17.1 MMOL/L (ref 22–26)
HCO3 BLDV-SCNC: 17.9 MMOL/L (ref 22–26)
HCO3 BLDV-SCNC: 19.1 MMOL/L (ref 22–26)
HCO3 BLDV-SCNC: 19.7 MMOL/L (ref 22–26)
HCO3 BLDV-SCNC: 20.1 MMOL/L
HCO3 BLDV-SCNC: 20.6 MMOL/L (ref 22–26)
HCO3 BLDV-SCNC: 21.1 MMOL/L (ref 22–26)
HCO3 BLDV-SCNC: 22.1 MMOL/L
HCO3 BLDV-SCNC: 22.6 MMOL/L
HCO3 BLDV-SCNC: 22.6 MMOL/L
HCO3 BLDV-SCNC: 23.2 MMOL/L
HCO3 BLDV-SCNC: 23.6 MMOL/L
HCO3 BLDV-SCNC: 23.7 MMOL/L
HCO3 BLDV-SCNC: 23.7 MMOL/L (ref 22–26)
HCO3 BLDV-SCNC: 23.7 MMOL/L (ref 22–26)
HCO3 BLDV-SCNC: 24.3 MMOL/L
HCO3 BLDV-SCNC: 24.3 MMOL/L
HCO3 BLDV-SCNC: 24.7 MMOL/L
HCO3 BLDV-SCNC: 25.2 MMOL/L
HCO3 BLDV-SCNC: 25.2 MMOL/L
HCO3 BLDV-SCNC: 25.3 MMOL/L
HCO3 BLDV-SCNC: 25.3 MMOL/L
HCO3 BLDV-SCNC: 25.5 MMOL/L
HCO3 BLDV-SCNC: 25.6 MMOL/L
HCO3 BLDV-SCNC: 25.6 MMOL/L
HCO3 BLDV-SCNC: 25.7 MMOL/L
HCO3 BLDV-SCNC: 25.9 MMOL/L
HCO3 BLDV-SCNC: 26 MMOL/L
HCO3 BLDV-SCNC: 26.6 MMOL/L
HCO3 BLDV-SCNC: 26.6 MMOL/L
HCO3 BLDV-SCNC: 27.4 MMOL/L
HCO3 BLDV-SCNC: 27.9 MMOL/L
HCO3 BLDV-SCNC: 27.9 MMOL/L
HCO3 BLDV-SCNC: 28.4 MMOL/L (ref 22–26)
HCO3 BLDV-SCNC: 28.5 MMOL/L
HCO3 BLDV-SCNC: 28.6 MMOL/L
HCO3 BLDV-SCNC: 29.1 MMOL/L
HCO3 BLDV-SCNC: 29.2 MMOL/L
HCO3 BLDV-SCNC: 29.7 MMOL/L
HCO3 BLDV-SCNC: 30.4 MMOL/L
HCO3 BLDV-SCNC: 30.4 MMOL/L
HCO3 BLDV-SCNC: 31.5 MMOL/L
HCO3 BLDV-SCNC: 32.5 MMOL/L
HCT VFR BLD AUTO: 17.7 % (ref 36–46)
HCT VFR BLD AUTO: 17.7 % (ref 36–46)
HCT VFR BLD AUTO: 18.2 % (ref 36–46)
HCT VFR BLD AUTO: 18.2 % (ref 36–46)
HCT VFR BLD AUTO: 18.4 % (ref 36–46)
HCT VFR BLD AUTO: 18.7 % (ref 36–46)
HCT VFR BLD AUTO: 18.7 % (ref 36–46)
HCT VFR BLD AUTO: 18.8 % (ref 36–46)
HCT VFR BLD AUTO: 19.2 % (ref 36–46)
HCT VFR BLD AUTO: 19.3 % (ref 36–46)
HCT VFR BLD AUTO: 19.3 % (ref 36–46)
HCT VFR BLD AUTO: 19.5 % (ref 36–46)
HCT VFR BLD AUTO: 19.6 % (ref 36–46)
HCT VFR BLD AUTO: 19.7 % (ref 36–46)
HCT VFR BLD AUTO: 19.7 % (ref 36–46)
HCT VFR BLD AUTO: 19.9 % (ref 36–46)
HCT VFR BLD AUTO: 19.9 % (ref 36–46)
HCT VFR BLD AUTO: 20.4 % (ref 36–46)
HCT VFR BLD AUTO: 20.7 % (ref 36–46)
HCT VFR BLD AUTO: 20.8 % (ref 36–46)
HCT VFR BLD AUTO: 20.9 % (ref 36–46)
HCT VFR BLD AUTO: 21 % (ref 36–46)
HCT VFR BLD AUTO: 21.3 % (ref 36–46)
HCT VFR BLD AUTO: 21.4 % (ref 36–46)
HCT VFR BLD AUTO: 21.5 % (ref 36–46)
HCT VFR BLD AUTO: 21.6 % (ref 36–46)
HCT VFR BLD AUTO: 21.6 % (ref 36–46)
HCT VFR BLD AUTO: 21.7 % (ref 36–46)
HCT VFR BLD AUTO: 21.8 % (ref 36–46)
HCT VFR BLD AUTO: 21.8 % (ref 36–46)
HCT VFR BLD AUTO: 22 % (ref 36–46)
HCT VFR BLD AUTO: 22.1 % (ref 36–46)
HCT VFR BLD AUTO: 22.1 % (ref 36–46)
HCT VFR BLD AUTO: 22.2 % (ref 36–46)
HCT VFR BLD AUTO: 22.2 % (ref 36–46)
HCT VFR BLD AUTO: 22.3 % (ref 36–46)
HCT VFR BLD AUTO: 22.4 % (ref 36–46)
HCT VFR BLD AUTO: 22.5 % (ref 36–46)
HCT VFR BLD AUTO: 22.5 % (ref 36–46)
HCT VFR BLD AUTO: 22.6 % (ref 36–46)
HCT VFR BLD AUTO: 22.7 % (ref 36–46)
HCT VFR BLD AUTO: 22.9 % (ref 36–46)
HCT VFR BLD AUTO: 23 % (ref 36–46)
HCT VFR BLD AUTO: 23 % (ref 36–46)
HCT VFR BLD AUTO: 23.1 % (ref 36–46)
HCT VFR BLD AUTO: 23.2 % (ref 36–46)
HCT VFR BLD AUTO: 23.3 % (ref 36–46)
HCT VFR BLD AUTO: 23.4 % (ref 36–46)
HCT VFR BLD AUTO: 23.5 % (ref 36–46)
HCT VFR BLD AUTO: 23.6 % (ref 36–46)
HCT VFR BLD AUTO: 23.6 % (ref 36–46)
HCT VFR BLD AUTO: 23.7 % (ref 36–46)
HCT VFR BLD AUTO: 23.7 % (ref 36–46)
HCT VFR BLD AUTO: 23.9 % (ref 36–46)
HCT VFR BLD AUTO: 23.9 % (ref 36–46)
HCT VFR BLD AUTO: 24 % (ref 36–46)
HCT VFR BLD AUTO: 24.1 % (ref 36–46)
HCT VFR BLD AUTO: 24.1 % (ref 36–46)
HCT VFR BLD AUTO: 24.2 % (ref 36–46)
HCT VFR BLD AUTO: 24.3 % (ref 36–46)
HCT VFR BLD AUTO: 24.5 % (ref 36–46)
HCT VFR BLD AUTO: 24.6 % (ref 36–46)
HCT VFR BLD AUTO: 24.7 % (ref 36–46)
HCT VFR BLD AUTO: 24.7 % (ref 36–46)
HCT VFR BLD AUTO: 24.9 % (ref 36–46)
HCT VFR BLD AUTO: 25 % (ref 36–46)
HCT VFR BLD AUTO: 25.2 % (ref 36–46)
HCT VFR BLD AUTO: 25.3 % (ref 36–46)
HCT VFR BLD AUTO: 25.5 % (ref 36–46)
HCT VFR BLD AUTO: 25.5 % (ref 36–46)
HCT VFR BLD AUTO: 25.6 % (ref 36–46)
HCT VFR BLD AUTO: 25.7 % (ref 36–46)
HCT VFR BLD AUTO: 25.7 % (ref 36–46)
HCT VFR BLD AUTO: 25.8 % (ref 36–46)
HCT VFR BLD AUTO: 25.8 % (ref 36–46)
HCT VFR BLD AUTO: 26.3 % (ref 36–46)
HCT VFR BLD AUTO: 26.3 % (ref 36–46)
HCT VFR BLD AUTO: 26.5 % (ref 36–46)
HCT VFR BLD AUTO: 26.6 % (ref 36–46)
HCT VFR BLD AUTO: 26.7 % (ref 36–46)
HCT VFR BLD AUTO: 26.9 % (ref 36–46)
HCT VFR BLD AUTO: 27 % (ref 36–46)
HCT VFR BLD AUTO: 27.2 % (ref 36–46)
HCT VFR BLD AUTO: 27.4 % (ref 36–46)
HCT VFR BLD AUTO: 27.7 % (ref 36–46)
HCT VFR BLD AUTO: 27.8 % (ref 36–46)
HCT VFR BLD AUTO: 28.2 % (ref 36–46)
HCT VFR BLD AUTO: 31.7 % (ref 36–46)
HCT VFR BLD AUTO: 31.8 % (ref 36–46)
HCT VFR BLD AUTO: 36.9 % (ref 36–46)
HCT VFR BLD AUTO: 39.3 % (ref 36–46)
HCT VFR BLD AUTO: 39.8 % (ref 36–46)
HCT VFR BLD AUTO: 40.4 % (ref 36–46)
HCT VFR BLD AUTO: 41 % (ref 36–46)
HCT VFR BLD EST: 14 % (ref 36–46)
HCT VFR BLD EST: 15 % (ref 36–46)
HCT VFR BLD EST: 17 % (ref 36–46)
HCT VFR BLD EST: 17 % (ref 36–46)
HCT VFR BLD EST: 18 % (ref 36–46)
HCT VFR BLD EST: 18 % (ref 36–46)
HCT VFR BLD EST: 19 % (ref 36–46)
HCT VFR BLD EST: 20 % (ref 36–46)
HCT VFR BLD EST: 21 % (ref 36–46)
HCT VFR BLD EST: 22 % (ref 36–46)
HCT VFR BLD EST: 23 % (ref 36–46)
HCT VFR BLD EST: 24 % (ref 36–46)
HCT VFR BLD EST: 25 % (ref 36–46)
HCT VFR BLD EST: 26 % (ref 36–46)
HCT VFR BLD EST: 27 % (ref 36–46)
HCT VFR BLD EST: 28 % (ref 36–46)
HCT VFR BLD EST: 29 % (ref 36–46)
HCT VFR BLD EST: 30 % (ref 36–46)
HCT VFR BLD EST: 31 % (ref 36–46)
HCT VFR BLD EST: 32 % (ref 36–46)
HCT VFR BLD EST: 33 % (ref 36–46)
HCT VFR BLD EST: 34 % (ref 36–46)
HCT VFR BLD EST: 34 % (ref 36–46)
HCT VFR BLD EST: 36 % (ref 36–46)
HCT VFR BLD EST: 38 % (ref 36–46)
HCT VFR BLD EST: 39 % (ref 36–46)
HCT VFR BLD EST: 40 % (ref 36–46)
HCT VFR BLD EST: 44 % (ref 36–46)
HCV AB SER QL: NONREACTIVE
HCV RNA SERPL NAA+PROBE-ACNC: NOT DETECTED K[IU]/ML
HCV RNA SERPL NAA+PROBE-ACNC: NOT DETECTED K[IU]/ML
HCV RNA SERPL NAA+PROBE-LOG IU: NORMAL {LOG_IU}/ML
HCV RNA SERPL NAA+PROBE-LOG IU: NORMAL {LOG_IU}/ML
HDLC SERPL-MCNC: 39.2 MG/DL
HERPES SIMPLEX VIRUS 1 IGG: 6.9 INDEX
HERPES SIMPLEX VIRUS 2 IGG: 1.8 INDEX
HGB BLD-MCNC: 11 G/DL (ref 12–16)
HGB BLD-MCNC: 11 G/DL (ref 12–16)
HGB BLD-MCNC: 12.4 G/DL (ref 12–16)
HGB BLD-MCNC: 12.5 G/DL (ref 12–16)
HGB BLD-MCNC: 12.6 G/DL (ref 12–16)
HGB BLD-MCNC: 12.7 G/DL (ref 12–16)
HGB BLD-MCNC: 12.9 G/DL (ref 12–16)
HGB BLD-MCNC: 6.2 G/DL (ref 12–16)
HGB BLD-MCNC: 6.3 G/DL (ref 12–16)
HGB BLD-MCNC: 6.4 G/DL (ref 12–16)
HGB BLD-MCNC: 6.4 G/DL (ref 12–16)
HGB BLD-MCNC: 6.5 G/DL (ref 12–16)
HGB BLD-MCNC: 6.5 G/DL (ref 12–16)
HGB BLD-MCNC: 6.6 G/DL (ref 12–16)
HGB BLD-MCNC: 6.7 G/DL (ref 12–16)
HGB BLD-MCNC: 6.8 G/DL (ref 12–16)
HGB BLD-MCNC: 6.9 G/DL (ref 12–16)
HGB BLD-MCNC: 7 G/DL (ref 12–16)
HGB BLD-MCNC: 7.1 G/DL (ref 12–16)
HGB BLD-MCNC: 7.2 G/DL (ref 12–16)
HGB BLD-MCNC: 7.2 G/DL (ref 12–16)
HGB BLD-MCNC: 7.3 G/DL (ref 12–16)
HGB BLD-MCNC: 7.4 G/DL (ref 12–16)
HGB BLD-MCNC: 7.5 G/DL (ref 12–16)
HGB BLD-MCNC: 7.6 G/DL (ref 12–16)
HGB BLD-MCNC: 7.7 G/DL (ref 12–16)
HGB BLD-MCNC: 7.8 G/DL (ref 12–16)
HGB BLD-MCNC: 7.9 G/DL (ref 12–16)
HGB BLD-MCNC: 8 G/DL (ref 12–16)
HGB BLD-MCNC: 8.1 G/DL (ref 12–16)
HGB BLD-MCNC: 8.2 G/DL (ref 12–16)
HGB BLD-MCNC: 8.3 G/DL (ref 12–16)
HGB BLD-MCNC: 8.4 G/DL (ref 12–16)
HGB BLD-MCNC: 8.5 G/DL (ref 12–16)
HGB BLD-MCNC: 8.6 G/DL (ref 12–16)
HGB BLD-MCNC: 8.6 G/DL (ref 12–16)
HGB BLD-MCNC: 8.7 G/DL (ref 12–16)
HGB BLD-MCNC: 8.8 G/DL (ref 12–16)
HGB BLD-MCNC: 8.9 G/DL (ref 12–16)
HGB BLD-MCNC: 9 G/DL (ref 12–16)
HGB BLD-MCNC: 9.3 G/DL (ref 12–16)
HGB BLD-MCNC: 9.3 G/DL (ref 12–16)
HGB BLD-MCNC: 9.5 G/DL (ref 12–16)
HGB BLD-MCNC: 9.6 G/DL (ref 12–16)
HGB BLDA-MCNC: 10 G/DL (ref 12–16)
HGB BLDA-MCNC: 10.2 G/DL (ref 12–16)
HGB BLDA-MCNC: 10.3 G/DL (ref 12–16)
HGB BLDA-MCNC: 10.5 G/DL (ref 12–16)
HGB BLDA-MCNC: 10.8 G/DL (ref 12–16)
HGB BLDA-MCNC: 10.9 G/DL (ref 12–16)
HGB BLDA-MCNC: 11.2 G/DL (ref 12–16)
HGB BLDA-MCNC: 11.9 G/DL (ref 12–16)
HGB BLDA-MCNC: 12.6 G/DL (ref 12–16)
HGB BLDA-MCNC: 13.1 G/DL (ref 12–16)
HGB BLDA-MCNC: 13.4 G/DL (ref 12–16)
HGB BLDA-MCNC: 14.8 G/DL (ref 12–16)
HGB BLDA-MCNC: 4.8 G/DL (ref 12–16)
HGB BLDA-MCNC: 5.1 G/DL (ref 12–16)
HGB BLDA-MCNC: 5.8 G/DL (ref 12–16)
HGB BLDA-MCNC: 6 G/DL (ref 12–16)
HGB BLDA-MCNC: 6 G/DL (ref 12–16)
HGB BLDA-MCNC: 6.3 G/DL (ref 12–16)
HGB BLDA-MCNC: 6.4 G/DL (ref 12–16)
HGB BLDA-MCNC: 6.4 G/DL (ref 12–16)
HGB BLDA-MCNC: 6.5 G/DL (ref 12–16)
HGB BLDA-MCNC: 6.6 G/DL (ref 12–16)
HGB BLDA-MCNC: 6.6 G/DL (ref 12–16)
HGB BLDA-MCNC: 6.7 G/DL (ref 12–16)
HGB BLDA-MCNC: 6.8 G/DL (ref 12–16)
HGB BLDA-MCNC: 6.9 G/DL (ref 12–16)
HGB BLDA-MCNC: 7 G/DL (ref 12–16)
HGB BLDA-MCNC: 7.1 G/DL (ref 12–16)
HGB BLDA-MCNC: 7.2 G/DL (ref 12–16)
HGB BLDA-MCNC: 7.3 G/DL (ref 12–16)
HGB BLDA-MCNC: 7.4 G/DL (ref 12–16)
HGB BLDA-MCNC: 7.5 G/DL (ref 12–16)
HGB BLDA-MCNC: 7.5 G/DL (ref 12–16)
HGB BLDA-MCNC: 7.6 G/DL (ref 12–16)
HGB BLDA-MCNC: 7.6 G/DL (ref 12–16)
HGB BLDA-MCNC: 7.7 G/DL (ref 12–16)
HGB BLDA-MCNC: 7.8 G/DL (ref 12–16)
HGB BLDA-MCNC: 7.9 G/DL (ref 12–16)
HGB BLDA-MCNC: 8 G/DL (ref 12–16)
HGB BLDA-MCNC: 8.1 G/DL (ref 12–16)
HGB BLDA-MCNC: 8.2 G/DL (ref 12–16)
HGB BLDA-MCNC: 8.3 G/DL (ref 12–16)
HGB BLDA-MCNC: 8.4 G/DL (ref 12–16)
HGB BLDA-MCNC: 8.5 G/DL (ref 12–16)
HGB BLDA-MCNC: 8.6 G/DL (ref 12–16)
HGB BLDA-MCNC: 8.7 G/DL (ref 12–16)
HGB BLDA-MCNC: 8.8 G/DL (ref 12–16)
HGB BLDA-MCNC: 8.9 G/DL (ref 12–16)
HGB BLDA-MCNC: 9 G/DL (ref 12–16)
HGB BLDA-MCNC: 9.1 G/DL (ref 12–16)
HGB BLDA-MCNC: 9.1 G/DL (ref 12–16)
HGB BLDA-MCNC: 9.2 G/DL (ref 12–16)
HGB BLDA-MCNC: 9.3 G/DL (ref 12–16)
HGB BLDA-MCNC: 9.4 G/DL (ref 12–16)
HGB BLDA-MCNC: 9.5 G/DL (ref 12–16)
HGB BLDA-MCNC: 9.6 G/DL (ref 12–16)
HGB BLDA-MCNC: 9.6 G/DL (ref 12–16)
HGB BLDA-MCNC: 9.7 G/DL (ref 12–16)
HGB BLDMV-MCNC: 10.3 G/DL (ref 12–16)
HGB BLDMV-MCNC: 10.5 G/DL (ref 12–16)
HGB BLDMV-MCNC: 10.6 G/DL (ref 12–16)
HGB BLDMV-MCNC: 10.7 G/DL (ref 12–16)
HGB BLDMV-MCNC: 7.2 G/DL (ref 12–16)
HGB BLDMV-MCNC: 7.3 G/DL (ref 12–16)
HGB BLDMV-MCNC: 7.4 G/DL (ref 12–16)
HGB BLDMV-MCNC: 7.5 G/DL (ref 12–16)
HGB BLDMV-MCNC: 7.6 G/DL (ref 12–16)
HGB BLDMV-MCNC: 7.7 G/DL (ref 12–16)
HGB BLDMV-MCNC: 7.8 G/DL (ref 12–16)
HGB BLDMV-MCNC: 7.9 G/DL (ref 12–16)
HGB BLDMV-MCNC: 8 G/DL (ref 12–16)
HGB BLDMV-MCNC: 8 G/DL (ref 12–16)
HGB BLDMV-MCNC: 8.1 G/DL (ref 12–16)
HGB BLDMV-MCNC: 8.2 G/DL (ref 12–16)
HGB BLDMV-MCNC: 8.3 G/DL (ref 12–16)
HGB BLDMV-MCNC: 8.3 G/DL (ref 12–16)
HGB BLDMV-MCNC: 8.5 G/DL (ref 12–16)
HGB BLDMV-MCNC: 8.5 G/DL (ref 12–16)
HGB BLDMV-MCNC: 8.6 G/DL (ref 12–16)
HGB BLDMV-MCNC: 8.6 G/DL (ref 12–16)
HGB BLDMV-MCNC: 8.8 G/DL (ref 12–16)
HGB BLDMV-MCNC: 8.9 G/DL (ref 12–16)
HGB BLDMV-MCNC: 9.2 G/DL (ref 12–16)
HGB BLDMV-MCNC: 9.3 G/DL (ref 12–16)
HGB BLDMV-MCNC: 9.3 G/DL (ref 12–16)
HGB BLDMV-MCNC: 9.8 G/DL (ref 12–16)
HGB BLDMV-MCNC: 9.8 G/DL (ref 12–16)
HGB BLDV-MCNC: 11.3 G/DL (ref 12–16)
HGB BLDV-MCNC: 5.7 G/DL (ref 12–16)
HGB BLDV-MCNC: 6.4 G/DL (ref 12–16)
HGB BLDV-MCNC: 6.7 G/DL (ref 12–16)
HGB BLDV-MCNC: 6.9 G/DL (ref 12–16)
HGB BLDV-MCNC: 7 G/DL (ref 12–16)
HGB BLDV-MCNC: 7.2 G/DL (ref 12–16)
HGB BLDV-MCNC: 7.3 G/DL (ref 12–16)
HGB BLDV-MCNC: 7.4 G/DL (ref 12–16)
HGB BLDV-MCNC: 7.5 G/DL (ref 12–16)
HGB BLDV-MCNC: 7.5 G/DL (ref 12–16)
HGB BLDV-MCNC: 7.6 G/DL (ref 12–16)
HGB BLDV-MCNC: 7.6 G/DL (ref 12–16)
HGB BLDV-MCNC: 7.7 G/DL (ref 12–16)
HGB BLDV-MCNC: 7.8 G/DL (ref 12–16)
HGB BLDV-MCNC: 7.8 G/DL (ref 12–16)
HGB BLDV-MCNC: 7.9 G/DL (ref 12–16)
HGB BLDV-MCNC: 8 G/DL (ref 12–16)
HGB BLDV-MCNC: 8.1 G/DL (ref 12–16)
HGB BLDV-MCNC: 8.2 G/DL (ref 12–16)
HGB BLDV-MCNC: 8.6 G/DL (ref 12–16)
HGB BLDV-MCNC: 8.6 G/DL (ref 12–16)
HGB BLDV-MCNC: 8.9 G/DL (ref 12–16)
HGB BLDV-MCNC: 9.2 G/DL (ref 12–16)
HGB BLDV-MCNC: 9.5 G/DL (ref 12–16)
HGB BLDV-MCNC: 9.7 G/DL (ref 12–16)
HGB BLDV-MCNC: 9.7 G/DL (ref 12–16)
HGB FREE PLAS-MCNC: 5.4 MG/DL (ref 0–9.7)
HGB FREE PLAS-MCNC: 86.2 MG/DL (ref 0–9.7)
HGB RETIC QN: 26 PG (ref 28–38)
HGB RETIC QN: 26 PG (ref 28–38)
HGB RETIC QN: 27 PG (ref 28–38)
HGB RETIC QN: 28 PG (ref 28–38)
HGB RETIC QN: 29 PG (ref 28–38)
HGB RETIC QN: 30 PG (ref 28–38)
HGB RETIC QN: 30 PG (ref 28–38)
HGB RETIC QN: 31 PG (ref 28–38)
HGB RETIC QN: 32 PG (ref 28–38)
HGB RETIC QN: 33 PG (ref 28–38)
HGB RETIC QN: 34 PG (ref 28–38)
HGB RETIC QN: 35 PG (ref 28–38)
HGB RETIC QN: 36 PG (ref 28–38)
HGB RETIC QN: 36 PG (ref 28–38)
HIV 1+2 AB+HIV1 P24 AG SERPL QL IA: NONREACTIVE
HIV 1+2 AB+HIV1 P24 AG SERPL QL IA: NONREACTIVE
HLA RESULTS: NORMAL
HLA-A+B+C AB NFR SER: NORMAL %
HLA-DP+DQ+DR AB NFR SER: NORMAL %
HOLD SPECIMEN: NORMAL
HOWELL-JOLLY BOD BLD QL SMEAR: PRESENT
HUMAN BOCAVIRUS RVP, VIRC: NOT DETECTED
HUMAN BOCAVIRUS RVP, VIRC: NOT DETECTED
HUMAN CORONAVIRUS RVP, VIRC: NOT DETECTED
HUMAN CORONAVIRUS RVP, VIRC: NOT DETECTED
HUMAN HERPESVIRUS-6 DNA PCR, QUANTITATIVE (NON-BLOOD SPECIMEN): 301 COPIES/ML
HUMAN HERPESVIRUS-6 PCR PLASMA: NOT DETECTED COPIES/ML
HYALINE CASTS #/AREA URNS AUTO: ABNORMAL /LPF
HYPOCHROMIA BLD QL SMEAR: ABNORMAL
HYPOCHROMIA BLD QL SMEAR: NORMAL
IGA SERPL-MCNC: 134 MG/DL (ref 70–400)
IGG SERPL-MCNC: 777 MG/DL (ref 700–1600)
IGG1 SER-MCNC: 604 MG/DL (ref 490–1140)
IGG2 SER-MCNC: 262 MG/DL (ref 150–640)
IGG3 SER-MCNC: 25 MG/DL (ref 11–85)
IGG4 SER-MCNC: 59 MG/DL (ref 3–200)
IGM SERPL-MCNC: 65 MG/DL (ref 40–230)
IMM GRANULOCYTES # BLD AUTO: 0 X10*3/UL (ref 0–0.7)
IMM GRANULOCYTES # BLD AUTO: 0.01 X10*3/UL (ref 0–0.7)
IMM GRANULOCYTES # BLD AUTO: 0.06 X10*3/UL (ref 0–0.7)
IMM GRANULOCYTES # BLD AUTO: 0.08 X10*3/UL (ref 0–0.7)
IMM GRANULOCYTES # BLD AUTO: 0.09 X10*3/UL (ref 0–0.7)
IMM GRANULOCYTES # BLD AUTO: 0.1 X10*3/UL (ref 0–0.7)
IMM GRANULOCYTES # BLD AUTO: 0.1 X10*3/UL (ref 0–0.7)
IMM GRANULOCYTES # BLD AUTO: 0.11 X10*3/UL (ref 0–0.7)
IMM GRANULOCYTES # BLD AUTO: 0.12 X10*3/UL (ref 0–0.7)
IMM GRANULOCYTES # BLD AUTO: 0.12 X10*3/UL (ref 0–0.7)
IMM GRANULOCYTES # BLD AUTO: 0.13 X10*3/UL (ref 0–0.7)
IMM GRANULOCYTES # BLD AUTO: 0.16 X10*3/UL (ref 0–0.7)
IMM GRANULOCYTES # BLD AUTO: 0.17 X10*3/UL (ref 0–0.7)
IMM GRANULOCYTES # BLD AUTO: 0.18 X10*3/UL (ref 0–0.7)
IMM GRANULOCYTES # BLD AUTO: 0.19 X10*3/UL (ref 0–0.7)
IMM GRANULOCYTES # BLD AUTO: 0.21 X10*3/UL (ref 0–0.7)
IMM GRANULOCYTES # BLD AUTO: 0.23 X10*3/UL (ref 0–0.7)
IMM GRANULOCYTES # BLD AUTO: 0.24 X10*3/UL (ref 0–0.7)
IMM GRANULOCYTES # BLD AUTO: 0.24 X10*3/UL (ref 0–0.7)
IMM GRANULOCYTES # BLD AUTO: 0.25 X10*3/UL (ref 0–0.7)
IMM GRANULOCYTES # BLD AUTO: 0.28 X10*3/UL (ref 0–0.7)
IMM GRANULOCYTES # BLD AUTO: 0.29 X10*3/UL (ref 0–0.7)
IMM GRANULOCYTES # BLD AUTO: 0.29 X10*3/UL (ref 0–0.7)
IMM GRANULOCYTES # BLD AUTO: 0.31 X10*3/UL (ref 0–0.7)
IMM GRANULOCYTES # BLD AUTO: 0.33 X10*3/UL (ref 0–0.7)
IMM GRANULOCYTES # BLD AUTO: 0.35 X10*3/UL (ref 0–0.7)
IMM GRANULOCYTES # BLD AUTO: 0.36 X10*3/UL (ref 0–0.7)
IMM GRANULOCYTES # BLD AUTO: 0.39 X10*3/UL (ref 0–0.7)
IMM GRANULOCYTES # BLD AUTO: 0.43 X10*3/UL (ref 0–0.7)
IMM GRANULOCYTES # BLD AUTO: 0.46 X10*3/UL (ref 0–0.7)
IMM GRANULOCYTES # BLD AUTO: 0.47 X10*3/UL (ref 0–0.7)
IMM GRANULOCYTES # BLD AUTO: 0.48 X10*3/UL (ref 0–0.7)
IMM GRANULOCYTES # BLD AUTO: 0.49 X10*3/UL (ref 0–0.7)
IMM GRANULOCYTES # BLD AUTO: 0.53 X10*3/UL (ref 0–0.7)
IMM GRANULOCYTES # BLD AUTO: 0.57 X10*3/UL (ref 0–0.7)
IMM GRANULOCYTES # BLD AUTO: 0.82 X10*3/UL (ref 0–0.7)
IMM GRANULOCYTES # BLD AUTO: 0.85 X10*3/UL (ref 0–0.7)
IMM GRANULOCYTES # BLD AUTO: 1.06 X10*3/UL (ref 0–0.7)
IMM GRANULOCYTES # BLD AUTO: 2.07 X10*3/UL (ref 0–0.7)
IMM GRANULOCYTES # BLD AUTO: 2.15 X10*3/UL (ref 0–0.7)
IMM GRANULOCYTES # BLD AUTO: 2.19 X10*3/UL (ref 0–0.7)
IMM GRANULOCYTES # BLD AUTO: 2.21 X10*3/UL (ref 0–0.7)
IMM GRANULOCYTES # BLD AUTO: 2.26 X10*3/UL (ref 0–0.7)
IMM GRANULOCYTES NFR BLD AUTO: 0 % (ref 0–0.9)
IMM GRANULOCYTES NFR BLD AUTO: 0.2 % (ref 0–0.9)
IMM GRANULOCYTES NFR BLD AUTO: 0.3 % (ref 0–0.9)
IMM GRANULOCYTES NFR BLD AUTO: 0.3 % (ref 0–0.9)
IMM GRANULOCYTES NFR BLD AUTO: 0.9 % (ref 0–0.9)
IMM GRANULOCYTES NFR BLD AUTO: 1 % (ref 0–0.9)
IMM GRANULOCYTES NFR BLD AUTO: 1.1 % (ref 0–0.9)
IMM GRANULOCYTES NFR BLD AUTO: 1.2 % (ref 0–0.9)
IMM GRANULOCYTES NFR BLD AUTO: 1.4 % (ref 0–0.9)
IMM GRANULOCYTES NFR BLD AUTO: 1.5 % (ref 0–0.9)
IMM GRANULOCYTES NFR BLD AUTO: 1.5 % (ref 0–0.9)
IMM GRANULOCYTES NFR BLD AUTO: 1.6 % (ref 0–0.9)
IMM GRANULOCYTES NFR BLD AUTO: 1.7 % (ref 0–0.9)
IMM GRANULOCYTES NFR BLD AUTO: 1.8 % (ref 0–0.9)
IMM GRANULOCYTES NFR BLD AUTO: 1.8 % (ref 0–0.9)
IMM GRANULOCYTES NFR BLD AUTO: 1.9 % (ref 0–0.9)
IMM GRANULOCYTES NFR BLD AUTO: 10.4 % (ref 0–0.9)
IMM GRANULOCYTES NFR BLD AUTO: 11.6 % (ref 0–0.9)
IMM GRANULOCYTES NFR BLD AUTO: 2 % (ref 0–0.9)
IMM GRANULOCYTES NFR BLD AUTO: 2.1 % (ref 0–0.9)
IMM GRANULOCYTES NFR BLD AUTO: 2.5 % (ref 0–0.9)
IMM GRANULOCYTES NFR BLD AUTO: 2.7 % (ref 0–0.9)
IMM GRANULOCYTES NFR BLD AUTO: 2.8 % (ref 0–0.9)
IMM GRANULOCYTES NFR BLD AUTO: 2.8 % (ref 0–0.9)
IMM GRANULOCYTES NFR BLD AUTO: 3.2 % (ref 0–0.9)
IMM GRANULOCYTES NFR BLD AUTO: 3.4 % (ref 0–0.9)
IMM GRANULOCYTES NFR BLD AUTO: 3.5 % (ref 0–0.9)
IMM GRANULOCYTES NFR BLD AUTO: 4.2 % (ref 0–0.9)
IMM GRANULOCYTES NFR BLD AUTO: 4.5 % (ref 0–0.9)
IMM GRANULOCYTES NFR BLD AUTO: 4.5 % (ref 0–0.9)
IMM GRANULOCYTES NFR BLD AUTO: 5 % (ref 0–0.9)
IMM GRANULOCYTES NFR BLD AUTO: 5.6 % (ref 0–0.9)
IMM GRANULOCYTES NFR BLD AUTO: 6.1 % (ref 0–0.9)
IMM GRANULOCYTES NFR BLD AUTO: 6.1 % (ref 0–0.9)
IMM GRANULOCYTES NFR BLD AUTO: 6.2 % (ref 0–0.9)
IMM GRANULOCYTES NFR BLD AUTO: 6.2 % (ref 0–0.9)
IMM GRANULOCYTES NFR BLD AUTO: 6.3 % (ref 0–0.9)
IMM GRANULOCYTES NFR BLD AUTO: 6.3 % (ref 0–0.9)
IMM GRANULOCYTES NFR BLD AUTO: 6.4 % (ref 0–0.9)
IMM GRANULOCYTES NFR BLD AUTO: 6.6 % (ref 0–0.9)
IMM GRANULOCYTES NFR BLD AUTO: 7 % (ref 0–0.9)
IMM GRANULOCYTES NFR BLD AUTO: 7.2 % (ref 0–0.9)
IMM GRANULOCYTES NFR BLD AUTO: 7.3 % (ref 0–0.9)
IMM GRANULOCYTES NFR BLD AUTO: 7.4 % (ref 0–0.9)
IMM GRANULOCYTES NFR BLD AUTO: 7.4 % (ref 0–0.9)
IMM GRANULOCYTES NFR BLD AUTO: 7.7 % (ref 0–0.9)
IMM GRANULOCYTES NFR BLD AUTO: 9 % (ref 0–0.9)
IMM GRANULOCYTES NFR BLD AUTO: 9.2 % (ref 0–0.9)
IMM GRANULOCYTES NFR BLD AUTO: 9.4 % (ref 0–0.9)
IMM GRANULOCYTES NFR BLD AUTO: 9.8 % (ref 0–0.9)
IMMATURE RETIC FRACTION: 30.7 %
IMMATURE RETIC FRACTION: 31.1 %
IMMATURE RETIC FRACTION: 32 %
IMMATURE RETIC FRACTION: 32.3 %
IMMATURE RETIC FRACTION: 32.5 %
IMMATURE RETIC FRACTION: 33.3 %
IMMATURE RETIC FRACTION: 33.6 %
IMMATURE RETIC FRACTION: 33.9 %
IMMATURE RETIC FRACTION: 34 %
IMMATURE RETIC FRACTION: 34 %
IMMATURE RETIC FRACTION: 34.1 %
IMMATURE RETIC FRACTION: 34.2 %
IMMATURE RETIC FRACTION: 34.4 %
IMMATURE RETIC FRACTION: 34.5 %
IMMATURE RETIC FRACTION: 34.7 %
IMMATURE RETIC FRACTION: 35 %
IMMATURE RETIC FRACTION: 35.1 %
IMMATURE RETIC FRACTION: 35.5 %
IMMATURE RETIC FRACTION: 35.5 %
IMMATURE RETIC FRACTION: 36.2 %
IMMATURE RETIC FRACTION: 36.3 %
IMMATURE RETIC FRACTION: 36.4 %
IMMATURE RETIC FRACTION: 37.2 %
IMMATURE RETIC FRACTION: 37.5 %
IMMATURE RETIC FRACTION: 37.8 %
IMMATURE RETIC FRACTION: 38.1 %
IMMATURE RETIC FRACTION: 38.2 %
IMMATURE RETIC FRACTION: 40.1 %
IMMATURE RETIC FRACTION: 40.4 %
IMMATURE RETIC FRACTION: 41.4 %
IMMATURE RETIC FRACTION: 43.5 %
IMMATURE RETIC FRACTION: 43.9 %
IMMATURE RETIC FRACTION: 44.9 %
IMMATURE RETIC FRACTION: 47.7 %
INFLUENZA A , VIRC: NOT DETECTED
INFLUENZA A , VIRC: NOT DETECTED
INFLUENZA A H1N1-09 , VIRC: NOT DETECTED
INFLUENZA A H1N1-09 , VIRC: NOT DETECTED
INFLUENZA B PCR, VIRC: NOT DETECTED
INFLUENZA B PCR, VIRC: NOT DETECTED
INHALED O2 CONCENTRATION: 0 %
INHALED O2 CONCENTRATION: 1 %
INHALED O2 CONCENTRATION: 1 %
INHALED O2 CONCENTRATION: 100 %
INHALED O2 CONCENTRATION: 140 %
INHALED O2 CONCENTRATION: 140 %
INHALED O2 CONCENTRATION: 20 %
INHALED O2 CONCENTRATION: 21 %
INHALED O2 CONCENTRATION: 25 %
INHALED O2 CONCENTRATION: 28 %
INHALED O2 CONCENTRATION: 30 %
INHALED O2 CONCENTRATION: 34 %
INHALED O2 CONCENTRATION: 40 %
INHALED O2 CONCENTRATION: 5 %
INHALED O2 CONCENTRATION: 50 %
INHALED O2 CONCENTRATION: 60 %
INHALED O2 CONCENTRATION: 80 %
INHALED O2 CONCENTRATION: 85 %
INHALED O2 CONCENTRATION: 90 %
INR PPP: 1 (ref 0.9–1.1)
INR PPP: 1 (ref 0.9–1.1)
INR PPP: 1.1 (ref 0.9–1.1)
INR PPP: 1.2 (ref 0.9–1.1)
INR PPP: 1.3 (ref 0.9–1.1)
INR PPP: 1.4 (ref 0.9–1.1)
INR PPP: 1.5 (ref 0.9–1.1)
INR PPP: 1.6 (ref 0.9–1.1)
INR PPP: 1.6 (ref 0.9–1.1)
INR PPP: 1.7 (ref 0.9–1.1)
INR PPP: 1.8 (ref 0.9–1.1)
INR PPP: 1.9 (ref 0.9–1.1)
INR PPP: 2 (ref 0.9–1.1)
INR PPP: 2.1 (ref 0.9–1.1)
INR PPP: 2.1 (ref 0.9–1.1)
INR PPP: 2.2 (ref 0.9–1.1)
INR PPP: 2.2 (ref 0.9–1.1)
INR PPP: 2.3 (ref 0.9–1.1)
INR PPP: 2.4 (ref 0.9–1.1)
INR PPP: 2.5 (ref 0.9–1.1)
INR PPP: 2.7 (ref 0.9–1.1)
INR PPP: 2.7 (ref 0.9–1.1)
INR PPP: 2.8 (ref 0.9–1.1)
INR PPP: 2.9 (ref 0.9–1.1)
INR PPP: 2.9 (ref 0.9–1.1)
INR PPP: 3.2 (ref 0.9–1.1)
INR PPP: 3.3 (ref 0.9–1.1)
INTERPRETATION FOR ANTI-PLATELET FACTOR 4 ANTIBODY: NEGATIVE
IRON SATN MFR SERPL: 13 % (ref 25–45)
IRON SERPL-MCNC: 42 UG/DL (ref 35–150)
KETONES UR STRIP.AUTO-MCNC: ABNORMAL MG/DL
KETONES UR STRIP.AUTO-MCNC: ABNORMAL MG/DL
KETONES UR STRIP.AUTO-MCNC: NEGATIVE MG/DL
KETONES UR STRIP.AUTO-MCNC: NEGATIVE MG/DL
LAB AP ASR DISCLAIMER: NORMAL
LAB AP ASR DISCLAIMER: NORMAL
LABORATORY COMMENT REPORT: ABNORMAL
LABORATORY COMMENT REPORT: NORMAL
LABORATORY COMMENT REPORT: NORMAL
LABORATORY COMMENT REPORT: NOT DETECTED
LACTATE BLDA-SCNC: 0.3 MMOL/L (ref 0.4–2)
LACTATE BLDA-SCNC: 0.4 MMOL/L (ref 0.4–2)
LACTATE BLDA-SCNC: 0.5 MMOL/L (ref 0.4–2)
LACTATE BLDA-SCNC: 0.6 MMOL/L (ref 0.4–2)
LACTATE BLDA-SCNC: 0.7 MMOL/L (ref 0.4–2)
LACTATE BLDA-SCNC: 0.8 MMOL/L (ref 0.4–2)
LACTATE BLDA-SCNC: 0.9 MMOL/L (ref 0.4–2)
LACTATE BLDA-SCNC: 1 MMOL/L (ref 0.4–2)
LACTATE BLDA-SCNC: 1.1 MMOL/L (ref 0.4–2)
LACTATE BLDA-SCNC: 1.2 MMOL/L (ref 0.4–2)
LACTATE BLDA-SCNC: 1.3 MMOL/L (ref 0.4–2)
LACTATE BLDA-SCNC: 1.4 MMOL/L (ref 0.4–2)
LACTATE BLDA-SCNC: 1.5 MMOL/L (ref 0.4–2)
LACTATE BLDA-SCNC: 1.6 MMOL/L (ref 0.4–2)
LACTATE BLDA-SCNC: 1.7 MMOL/L (ref 0.4–2)
LACTATE BLDA-SCNC: 1.7 MMOL/L (ref 0.4–2)
LACTATE BLDA-SCNC: 1.8 MMOL/L (ref 0.4–2)
LACTATE BLDA-SCNC: 1.9 MMOL/L (ref 0.4–2)
LACTATE BLDA-SCNC: 2.2 MMOL/L (ref 0.4–2)
LACTATE BLDA-SCNC: 2.4 MMOL/L (ref 0.4–2)
LACTATE BLDA-SCNC: 2.5 MMOL/L (ref 0.4–2)
LACTATE BLDA-SCNC: 2.5 MMOL/L (ref 0.4–2)
LACTATE BLDA-SCNC: 2.7 MMOL/L (ref 0.4–2)
LACTATE BLDA-SCNC: 2.8 MMOL/L (ref 0.4–2)
LACTATE BLDA-SCNC: 2.9 MMOL/L (ref 0.4–2)
LACTATE BLDA-SCNC: 3.1 MMOL/L (ref 0.4–2)
LACTATE BLDA-SCNC: 3.8 MMOL/L (ref 0.4–2)
LACTATE BLDA-SCNC: 4.3 MMOL/L (ref 0.4–2)
LACTATE BLDA-SCNC: 4.4 MMOL/L (ref 0.4–2)
LACTATE BLDA-SCNC: 4.6 MMOL/L (ref 0.4–2)
LACTATE BLDA-SCNC: 6.9 MMOL/L (ref 0.4–2)
LACTATE BLDA-SCNC: 7 MMOL/L (ref 0.4–2)
LACTATE BLDMV-SCNC: 0.5 MMOL/L (ref 0.4–2)
LACTATE BLDMV-SCNC: 0.6 MMOL/L (ref 0.4–2)
LACTATE BLDMV-SCNC: 0.7 MMOL/L (ref 0.4–2)
LACTATE BLDMV-SCNC: 0.8 MMOL/L (ref 0.4–2)
LACTATE BLDMV-SCNC: 1 MMOL/L (ref 0.4–2)
LACTATE BLDMV-SCNC: 1 MMOL/L (ref 0.4–2)
LACTATE BLDMV-SCNC: 1.1 MMOL/L (ref 0.4–2)
LACTATE BLDMV-SCNC: 1.2 MMOL/L (ref 0.4–2)
LACTATE BLDMV-SCNC: 1.2 MMOL/L (ref 0.4–2)
LACTATE BLDMV-SCNC: 1.3 MMOL/L (ref 0.4–2)
LACTATE BLDMV-SCNC: 1.4 MMOL/L (ref 0.4–2)
LACTATE BLDMV-SCNC: 1.4 MMOL/L (ref 0.4–2)
LACTATE BLDMV-SCNC: 1.5 MMOL/L (ref 0.4–2)
LACTATE BLDMV-SCNC: 1.6 MMOL/L (ref 0.4–2)
LACTATE BLDMV-SCNC: 1.7 MMOL/L (ref 0.4–2)
LACTATE BLDMV-SCNC: 1.7 MMOL/L (ref 0.4–2)
LACTATE BLDMV-SCNC: 1.8 MMOL/L (ref 0.4–2)
LACTATE BLDMV-SCNC: 1.9 MMOL/L (ref 0.4–2)
LACTATE BLDMV-SCNC: 1.9 MMOL/L (ref 0.4–2)
LACTATE BLDMV-SCNC: 2 MMOL/L (ref 0.4–2)
LACTATE BLDMV-SCNC: 2 MMOL/L (ref 0.4–2)
LACTATE BLDMV-SCNC: 2.1 MMOL/L (ref 0.4–2)
LACTATE BLDMV-SCNC: 2.2 MMOL/L (ref 0.4–2)
LACTATE BLDMV-SCNC: 2.3 MMOL/L (ref 0.4–2)
LACTATE BLDMV-SCNC: 2.3 MMOL/L (ref 0.4–2)
LACTATE BLDMV-SCNC: 2.4 MMOL/L (ref 0.4–2)
LACTATE BLDMV-SCNC: 2.7 MMOL/L (ref 0.4–2)
LACTATE BLDMV-SCNC: 2.7 MMOL/L (ref 0.4–2)
LACTATE BLDV-SCNC: 0.4 MMOL/L (ref 0.4–2)
LACTATE BLDV-SCNC: 0.6 MMOL/L (ref 0.4–2)
LACTATE BLDV-SCNC: 0.6 MMOL/L (ref 0.4–2)
LACTATE BLDV-SCNC: 0.7 MMOL/L (ref 0.4–2)
LACTATE BLDV-SCNC: 0.8 MMOL/L (ref 0.4–2)
LACTATE BLDV-SCNC: 0.8 MMOL/L (ref 0.4–2)
LACTATE BLDV-SCNC: 0.9 MMOL/L (ref 0.4–2)
LACTATE BLDV-SCNC: 0.9 MMOL/L (ref 0.4–2)
LACTATE BLDV-SCNC: 1 MMOL/L (ref 0.4–2)
LACTATE BLDV-SCNC: 1.1 MMOL/L (ref 0.4–2)
LACTATE BLDV-SCNC: 1.2 MMOL/L (ref 0.4–2)
LACTATE BLDV-SCNC: 1.2 MMOL/L (ref 0.4–2)
LACTATE BLDV-SCNC: 1.3 MMOL/L (ref 0.4–2)
LACTATE BLDV-SCNC: 1.3 MMOL/L (ref 0.4–2)
LACTATE BLDV-SCNC: 1.4 MMOL/L (ref 0.4–2)
LACTATE BLDV-SCNC: 1.5 MMOL/L (ref 0.4–2)
LACTATE BLDV-SCNC: 1.7 MMOL/L (ref 0.4–2)
LACTATE BLDV-SCNC: 2.6 MMOL/L (ref 0.4–2)
LACTATE BLDV-SCNC: 2.8 MMOL/L (ref 0.4–2)
LACTATE BLDV-SCNC: 3.1 MMOL/L (ref 0.4–2)
LACTATE BLDV-SCNC: 3.3 MMOL/L (ref 0.4–2)
LACTATE BLDV-SCNC: 4.1 MMOL/L (ref 0.4–2)
LACTATE BLDV-SCNC: 4.6 MMOL/L (ref 0.4–2)
LACTATE BLDV-SCNC: 4.7 MMOL/L (ref 0.4–2)
LACTATE BLDV-SCNC: 5 MMOL/L (ref 0.4–2)
LACTATE SERPL-SCNC: 0.7 MMOL/L (ref 0.4–2)
LACTATE SERPL-SCNC: 1 MMOL/L (ref 0.4–2)
LACTATE SERPL-SCNC: 1.1 MMOL/L (ref 0.4–2)
LACTATE SERPL-SCNC: 1.2 MMOL/L (ref 0.4–2)
LACTATE SERPL-SCNC: 1.2 MMOL/L (ref 0.4–2)
LACTATE SERPL-SCNC: 1.3 MMOL/L (ref 0.4–2)
LACTATE SERPL-SCNC: 1.3 MMOL/L (ref 0.4–2)
LACTATE SERPL-SCNC: 1.4 MMOL/L (ref 0.4–2)
LACTATE SERPL-SCNC: 1.5 MMOL/L (ref 0.4–2)
LACTATE SERPL-SCNC: 1.6 MMOL/L (ref 0.4–2)
LACTATE SERPL-SCNC: 1.7 MMOL/L (ref 0.4–2)
LACTATE SERPL-SCNC: 1.8 MMOL/L (ref 0.4–2)
LACTATE SERPL-SCNC: 1.9 MMOL/L (ref 0.4–2)
LACTATE SERPL-SCNC: 1.9 MMOL/L (ref 0.4–2)
LACTATE SERPL-SCNC: 2 MMOL/L (ref 0.4–2)
LACTATE SERPL-SCNC: 2.1 MMOL/L (ref 0.4–2)
LACTATE SERPL-SCNC: 2.2 MMOL/L (ref 0.4–2)
LACTATE SERPL-SCNC: 2.3 MMOL/L (ref 0.4–2)
LACTATE SERPL-SCNC: 2.3 MMOL/L (ref 0.4–2)
LACTATE SERPL-SCNC: 2.4 MMOL/L (ref 0.4–2)
LACTATE SERPL-SCNC: 2.4 MMOL/L (ref 0.4–2)
LACTATE SERPL-SCNC: 2.5 MMOL/L (ref 0.4–2)
LACTATE SERPL-SCNC: 2.6 MMOL/L (ref 0.4–2)
LACTATE SERPL-SCNC: 2.7 MMOL/L (ref 0.4–2)
LACTATE SERPL-SCNC: 3.2 MMOL/L (ref 0.4–2)
LACTATE SERPL-SCNC: 3.6 MMOL/L (ref 0.4–2)
LACTATE SERPL-SCNC: 3.6 MMOL/L (ref 0.4–2)
LACTATE SERPL-SCNC: 4.3 MMOL/L (ref 0.4–2)
LACTOFERRIN STL QL IA: POSITIVE
LDH SERPL L TO P-CCNC: 1005 U/L (ref 84–246)
LDH SERPL L TO P-CCNC: 1036 U/L (ref 84–246)
LDH SERPL L TO P-CCNC: 1105 U/L (ref 84–246)
LDH SERPL L TO P-CCNC: 1126 U/L (ref 84–246)
LDH SERPL L TO P-CCNC: 1136 U/L (ref 84–246)
LDH SERPL L TO P-CCNC: 1172 U/L (ref 84–246)
LDH SERPL L TO P-CCNC: 1316 U/L (ref 84–246)
LDH SERPL L TO P-CCNC: 1333 U/L (ref 84–246)
LDH SERPL L TO P-CCNC: 1360 U/L (ref 84–246)
LDH SERPL L TO P-CCNC: 1387 U/L (ref 84–246)
LDH SERPL L TO P-CCNC: 1397 U/L (ref 84–246)
LDH SERPL L TO P-CCNC: 1422 U/L (ref 84–246)
LDH SERPL L TO P-CCNC: 1431 U/L (ref 84–246)
LDH SERPL L TO P-CCNC: 1433 U/L (ref 84–246)
LDH SERPL L TO P-CCNC: 1441 U/L (ref 84–246)
LDH SERPL L TO P-CCNC: 1451 U/L (ref 84–246)
LDH SERPL L TO P-CCNC: 1498 U/L (ref 84–246)
LDH SERPL L TO P-CCNC: 1531 U/L (ref 84–246)
LDH SERPL L TO P-CCNC: 1539 U/L (ref 84–246)
LDH SERPL L TO P-CCNC: 1543 U/L (ref 84–246)
LDH SERPL L TO P-CCNC: 1548 U/L (ref 84–246)
LDH SERPL L TO P-CCNC: 1556 U/L (ref 84–246)
LDH SERPL L TO P-CCNC: 1571 U/L (ref 84–246)
LDH SERPL L TO P-CCNC: 1576 U/L (ref 84–246)
LDH SERPL L TO P-CCNC: 1590 U/L (ref 84–246)
LDH SERPL L TO P-CCNC: 1656 U/L (ref 84–246)
LDH SERPL L TO P-CCNC: 1677 U/L (ref 84–246)
LDH SERPL L TO P-CCNC: 1691 U/L (ref 84–246)
LDH SERPL L TO P-CCNC: 1714 U/L (ref 84–246)
LDH SERPL L TO P-CCNC: 1757 U/L (ref 84–246)
LDH SERPL L TO P-CCNC: 1786 U/L (ref 84–246)
LDH SERPL L TO P-CCNC: 1816 U/L (ref 84–246)
LDH SERPL L TO P-CCNC: 1879 U/L (ref 84–246)
LDH SERPL L TO P-CCNC: 1908 U/L (ref 84–246)
LDH SERPL L TO P-CCNC: 2111 U/L (ref 84–246)
LDH SERPL L TO P-CCNC: 2139 U/L (ref 84–246)
LDH SERPL L TO P-CCNC: 2167 U/L (ref 84–246)
LDH SERPL L TO P-CCNC: 2170 U/L (ref 84–246)
LDH SERPL L TO P-CCNC: 2276 U/L (ref 84–246)
LDH SERPL L TO P-CCNC: 2311 U/L (ref 84–246)
LDH SERPL L TO P-CCNC: 246 U/L (ref 84–246)
LDH SERPL L TO P-CCNC: 254 U/L (ref 84–246)
LDH SERPL L TO P-CCNC: 258 U/L (ref 84–246)
LDH SERPL L TO P-CCNC: 2781 U/L (ref 84–246)
LDH SERPL L TO P-CCNC: 2859 U/L (ref 84–246)
LDH SERPL L TO P-CCNC: 3038 U/L (ref 84–246)
LDH SERPL L TO P-CCNC: 3424 U/L (ref 84–246)
LDH SERPL L TO P-CCNC: 357 U/L (ref 84–246)
LDH SERPL L TO P-CCNC: 3827 U/L (ref 84–246)
LDH SERPL L TO P-CCNC: 389 U/L (ref 84–246)
LDH SERPL L TO P-CCNC: 390 U/L (ref 84–246)
LDH SERPL L TO P-CCNC: 405 U/L (ref 84–246)
LDH SERPL L TO P-CCNC: 405 U/L (ref 84–246)
LDH SERPL L TO P-CCNC: 420 U/L (ref 84–246)
LDH SERPL L TO P-CCNC: 424 U/L (ref 84–246)
LDH SERPL L TO P-CCNC: 446 U/L (ref 84–246)
LDH SERPL L TO P-CCNC: 454 U/L (ref 84–246)
LDH SERPL L TO P-CCNC: 462 U/L (ref 84–246)
LDH SERPL L TO P-CCNC: 496 U/L (ref 84–246)
LDH SERPL L TO P-CCNC: 500 U/L (ref 84–246)
LDH SERPL L TO P-CCNC: 512 U/L (ref 84–246)
LDH SERPL L TO P-CCNC: 521 U/L (ref 84–246)
LDH SERPL L TO P-CCNC: 522 U/L (ref 84–246)
LDH SERPL L TO P-CCNC: 549 U/L (ref 84–246)
LDH SERPL L TO P-CCNC: 557 U/L (ref 84–246)
LDH SERPL L TO P-CCNC: 566 U/L (ref 84–246)
LDH SERPL L TO P-CCNC: 571 U/L (ref 84–246)
LDH SERPL L TO P-CCNC: 594 U/L (ref 84–246)
LDH SERPL L TO P-CCNC: 654 U/L (ref 84–246)
LDH SERPL L TO P-CCNC: 659 U/L (ref 84–246)
LDH SERPL L TO P-CCNC: 695 U/L (ref 84–246)
LDH SERPL L TO P-CCNC: 878 U/L (ref 84–246)
LDH SERPL L TO P-CCNC: 925 U/L (ref 84–246)
LDH SERPL L TO P-CCNC: 958 U/L (ref 84–246)
LDLC SERPL CALC-MCNC: 94 MG/DL
LEFT ATRIUM VOLUME AREA LENGTH INDEX BSA: 105.4 ML/M2
LEFT ATRIUM VOLUME AREA LENGTH INDEX BSA: 39 ML/M2
LEFT ATRIUM VOLUME AREA LENGTH INDEX BSA: 51.6 ML/M2
LEFT VENTRICLE INTERNAL DIMENSION DIASTOLE: 3.8 CM (ref 3.5–6)
LEFT VENTRICLE INTERNAL DIMENSION DIASTOLE: 3.92 CM (ref 3.5–6)
LEFT VENTRICLE INTERNAL DIMENSION DIASTOLE: 4 CM (ref 3.5–6)
LEFT VENTRICLE INTERNAL DIMENSION DIASTOLE: 4.2 CM (ref 3.5–6)
LEFT VENTRICLE INTERNAL DIMENSION DIASTOLE: 4.3 CM (ref 3.5–6)
LEFT VENTRICLE INTERNAL DIMENSION DIASTOLE: 4.4 CM (ref 3.5–6)
LEFT VENTRICLE INTERNAL DIMENSION DIASTOLE: 4.6 CM (ref 3.5–6)
LEFT VENTRICLE INTERNAL DIMENSION DIASTOLE: 4.8 CM (ref 3.5–6)
LEFT VENTRICLE INTERNAL DIMENSION DIASTOLE: 4.8 CM (ref 3.5–6)
LEFT VENTRICULAR OUTFLOW TRACT DIAMETER: 1.7 CM
LEFT VENTRICULAR OUTFLOW TRACT DIAMETER: 1.88 CM
LEFT VENTRICULAR OUTFLOW TRACT DIAMETER: 1.9 CM
LEGIONELLA PNEUMO PCR, VIRC: NOT DETECTED
LEUKOCYTE ESTERASE UR QL STRIP.AUTO: ABNORMAL
LEUKOCYTE ESTERASE UR QL STRIP.AUTO: ABNORMAL
LEUKOCYTE ESTERASE UR QL STRIP.AUTO: NEGATIVE
LEUKOCYTE ESTERASE UR QL STRIP.AUTO: NEGATIVE
LIPASE SERPL-CCNC: 46 U/L (ref 9–82)
LIPASE SERPL-CCNC: 9 U/L (ref 9–82)
LIPASE SERPL-CCNC: 93 U/L (ref 9–82)
LV EJECTION FRACTION BIPLANE: 22 %
LYMPHOCYTES # BLD AUTO: 0.06 X10*3/UL (ref 1.2–4.8)
LYMPHOCYTES # BLD AUTO: 0.11 X10*3/UL (ref 1.2–4.8)
LYMPHOCYTES # BLD AUTO: 0.12 X10*3/UL (ref 1.2–4.8)
LYMPHOCYTES # BLD AUTO: 0.17 X10*3/UL (ref 1.2–4.8)
LYMPHOCYTES # BLD AUTO: 0.21 X10*3/UL (ref 1.2–4.8)
LYMPHOCYTES # BLD AUTO: 0.23 X10*3/UL (ref 1.2–4.8)
LYMPHOCYTES # BLD AUTO: 0.31 X10*3/UL (ref 1.2–4.8)
LYMPHOCYTES # BLD AUTO: 0.35 X10*3/UL (ref 1.2–4.8)
LYMPHOCYTES # BLD AUTO: 0.38 X10*3/UL (ref 1.2–4.8)
LYMPHOCYTES # BLD AUTO: 0.4 X10*3/UL (ref 1.2–4.8)
LYMPHOCYTES # BLD AUTO: 0.44 X10*3/UL (ref 1.2–4.8)
LYMPHOCYTES # BLD AUTO: 0.45 X10*3/UL (ref 1.2–4.8)
LYMPHOCYTES # BLD AUTO: 0.51 X10*3/UL (ref 1.2–4.8)
LYMPHOCYTES # BLD AUTO: 0.56 X10*3/UL (ref 1.2–4.8)
LYMPHOCYTES # BLD AUTO: 0.58 X10*3/UL (ref 1.2–4.8)
LYMPHOCYTES # BLD AUTO: 0.59 X10*3/UL (ref 1.2–4.8)
LYMPHOCYTES # BLD AUTO: 0.61 X10*3/UL (ref 1.2–4.8)
LYMPHOCYTES # BLD AUTO: 0.62 X10*3/UL (ref 1.2–4.8)
LYMPHOCYTES # BLD AUTO: 0.7 X10*3/UL (ref 1.2–4.8)
LYMPHOCYTES # BLD AUTO: 1.12 X10*3/UL (ref 1.2–4.8)
LYMPHOCYTES # BLD AUTO: 1.28 X10*3/UL (ref 1.2–4.8)
LYMPHOCYTES # BLD AUTO: 1.28 X10*3/UL (ref 1.2–4.8)
LYMPHOCYTES # BLD AUTO: 1.42 X10*3/UL (ref 1.2–4.8)
LYMPHOCYTES # BLD AUTO: 1.59 X10*3/UL (ref 1.2–4.8)
LYMPHOCYTES # BLD AUTO: 1.7 X10*3/UL (ref 1.2–4.8)
LYMPHOCYTES # BLD AUTO: 1.95 X10*3/UL (ref 1.2–4.8)
LYMPHOCYTES # BLD MANUAL: 0 X10*3/UL (ref 1.2–4.8)
LYMPHOCYTES # BLD MANUAL: 0 X10*3/UL (ref 1.2–4.8)
LYMPHOCYTES # BLD MANUAL: 0.08 X10*3/UL (ref 1.2–4.8)
LYMPHOCYTES # BLD MANUAL: 0.12 X10*3/UL (ref 1.2–4.8)
LYMPHOCYTES # BLD MANUAL: 0.13 X10*3/UL (ref 1.2–4.8)
LYMPHOCYTES # BLD MANUAL: 0.15 X10*3/UL (ref 1.2–4.8)
LYMPHOCYTES # BLD MANUAL: 0.22 X10*3/UL (ref 1.2–4.8)
LYMPHOCYTES # BLD MANUAL: 0.24 X10*3/UL (ref 1.2–4.8)
LYMPHOCYTES # BLD MANUAL: 0.31 X10*3/UL (ref 1.2–4.8)
LYMPHOCYTES # BLD MANUAL: 0.32 X10*3/UL (ref 1.2–4.8)
LYMPHOCYTES # BLD MANUAL: 0.33 X10*3/UL (ref 1.2–4.8)
LYMPHOCYTES # BLD MANUAL: 0.43 X10*3/UL (ref 1.2–4.8)
LYMPHOCYTES # BLD MANUAL: 0.45 X10*3/UL (ref 1.2–4.8)
LYMPHOCYTES # BLD MANUAL: 0.46 X10*3/UL (ref 1.2–4.8)
LYMPHOCYTES # BLD MANUAL: 0.56 X10*3/UL (ref 1.2–4.8)
LYMPHOCYTES # BLD MANUAL: 0.59 X10*3/UL (ref 1.2–4.8)
LYMPHOCYTES # BLD MANUAL: 0.6 X10*3/UL (ref 1.2–4.8)
LYMPHOCYTES # BLD MANUAL: 0.67 X10*3/UL (ref 1.2–4.8)
LYMPHOCYTES # BLD MANUAL: 1.68 X10*3/UL (ref 1.2–4.8)
LYMPHOCYTES # BLD MANUAL: 1.75 X10*3/UL (ref 1.2–4.8)
LYMPHOCYTES # SPEC AUTO: 0.12 X10*3/UL
LYMPHOCYTES # SPEC AUTO: 1.7 X10*3/UL
LYMPHOCYTES NFR BLD AUTO: 1.3 %
LYMPHOCYTES NFR BLD AUTO: 1.4 %
LYMPHOCYTES NFR BLD AUTO: 1.7 %
LYMPHOCYTES NFR BLD AUTO: 16.1 %
LYMPHOCYTES NFR BLD AUTO: 17.9 %
LYMPHOCYTES NFR BLD AUTO: 18.4 %
LYMPHOCYTES NFR BLD AUTO: 2.2 %
LYMPHOCYTES NFR BLD AUTO: 2.5 %
LYMPHOCYTES NFR BLD AUTO: 2.7 %
LYMPHOCYTES NFR BLD AUTO: 2.7 %
LYMPHOCYTES NFR BLD AUTO: 25.5 %
LYMPHOCYTES NFR BLD AUTO: 3.7 %
LYMPHOCYTES NFR BLD AUTO: 3.8 %
LYMPHOCYTES NFR BLD AUTO: 34 %
LYMPHOCYTES NFR BLD AUTO: 4.3 %
LYMPHOCYTES NFR BLD AUTO: 5.3 %
LYMPHOCYTES NFR BLD AUTO: 5.4 %
LYMPHOCYTES NFR BLD AUTO: 52.1 %
LYMPHOCYTES NFR BLD AUTO: 54 %
LYMPHOCYTES NFR BLD AUTO: 6.1 %
LYMPHOCYTES NFR BLD AUTO: 6.2 %
LYMPHOCYTES NFR BLD AUTO: 6.2 %
LYMPHOCYTES NFR BLD AUTO: 6.7 %
LYMPHOCYTES NFR BLD AUTO: 6.7 %
LYMPHOCYTES NFR BLD AUTO: 8.6 %
LYMPHOCYTES NFR BLD AUTO: 9 %
LYMPHOCYTES NFR BLD AUTO: 9.1 %
LYMPHOCYTES NFR BLD AUTO: 9.1 %
LYMPHOCYTES NFR BLD MANUAL: 0 %
LYMPHOCYTES NFR BLD MANUAL: 0 %
LYMPHOCYTES NFR BLD MANUAL: 0.9 %
LYMPHOCYTES NFR BLD MANUAL: 1.7 %
LYMPHOCYTES NFR BLD MANUAL: 11.2 %
LYMPHOCYTES NFR BLD MANUAL: 11.3 %
LYMPHOCYTES NFR BLD MANUAL: 2.6 %
LYMPHOCYTES NFR BLD MANUAL: 3 %
LYMPHOCYTES NFR BLD MANUAL: 3.1 %
LYMPHOCYTES NFR BLD MANUAL: 3.4 %
LYMPHOCYTES NFR BLD MANUAL: 3.4 %
LYMPHOCYTES NFR BLD MANUAL: 3.9 %
LYMPHOCYTES NFR BLD MANUAL: 4.4 %
LYMPHOCYTES NFR BLD MANUAL: 5.4 %
LYMPHOCYTES NFR BLD MANUAL: 6 %
LYMPHOCYTES NFR BLD MANUAL: 6.8 %
LYMPHOCYTES NFR BLD MANUAL: 6.8 %
LYMPHOCYTES NFR BLD MANUAL: 7.2 %
LYMPHOCYTES NFR BLD MANUAL: 7.8 %
LYMPHOCYTES NFR BLD MANUAL: 9.7 %
MAGNESIUM SERPL-MCNC: 1.76 MG/DL (ref 1.6–2.4)
MAGNESIUM SERPL-MCNC: 1.81 MG/DL (ref 1.6–2.4)
MAGNESIUM SERPL-MCNC: 1.83 MG/DL (ref 1.6–2.4)
MAGNESIUM SERPL-MCNC: 1.84 MG/DL (ref 1.6–2.4)
MAGNESIUM SERPL-MCNC: 1.87 MG/DL (ref 1.6–2.4)
MAGNESIUM SERPL-MCNC: 1.9 MG/DL (ref 1.6–2.4)
MAGNESIUM SERPL-MCNC: 1.9 MG/DL (ref 1.6–2.4)
MAGNESIUM SERPL-MCNC: 1.91 MG/DL (ref 1.6–2.4)
MAGNESIUM SERPL-MCNC: 1.92 MG/DL (ref 1.6–2.4)
MAGNESIUM SERPL-MCNC: 1.94 MG/DL (ref 1.6–2.4)
MAGNESIUM SERPL-MCNC: 1.95 MG/DL (ref 1.6–2.4)
MAGNESIUM SERPL-MCNC: 1.96 MG/DL (ref 1.6–2.4)
MAGNESIUM SERPL-MCNC: 1.97 MG/DL (ref 1.6–2.4)
MAGNESIUM SERPL-MCNC: 1.97 MG/DL (ref 1.6–2.4)
MAGNESIUM SERPL-MCNC: 1.98 MG/DL (ref 1.6–2.4)
MAGNESIUM SERPL-MCNC: 2.01 MG/DL (ref 1.6–2.4)
MAGNESIUM SERPL-MCNC: 2.04 MG/DL (ref 1.6–2.4)
MAGNESIUM SERPL-MCNC: 2.09 MG/DL (ref 1.6–2.4)
MAGNESIUM SERPL-MCNC: 2.09 MG/DL (ref 1.6–2.4)
MAGNESIUM SERPL-MCNC: 2.11 MG/DL (ref 1.6–2.4)
MAGNESIUM SERPL-MCNC: 2.12 MG/DL (ref 1.6–2.4)
MAGNESIUM SERPL-MCNC: 2.14 MG/DL (ref 1.6–2.4)
MAGNESIUM SERPL-MCNC: 2.15 MG/DL (ref 1.6–2.4)
MAGNESIUM SERPL-MCNC: 2.17 MG/DL (ref 1.6–2.4)
MAGNESIUM SERPL-MCNC: 2.19 MG/DL (ref 1.6–2.4)
MAGNESIUM SERPL-MCNC: 2.21 MG/DL (ref 1.6–2.4)
MAGNESIUM SERPL-MCNC: 2.21 MG/DL (ref 1.6–2.4)
MAGNESIUM SERPL-MCNC: 2.22 MG/DL (ref 1.6–2.4)
MAGNESIUM SERPL-MCNC: 2.23 MG/DL (ref 1.6–2.4)
MAGNESIUM SERPL-MCNC: 2.23 MG/DL (ref 1.6–2.4)
MAGNESIUM SERPL-MCNC: 2.25 MG/DL (ref 1.6–2.4)
MAGNESIUM SERPL-MCNC: 2.26 MG/DL (ref 1.6–2.4)
MAGNESIUM SERPL-MCNC: 2.27 MG/DL (ref 1.6–2.4)
MAGNESIUM SERPL-MCNC: 2.28 MG/DL (ref 1.6–2.4)
MAGNESIUM SERPL-MCNC: 2.29 MG/DL (ref 1.6–2.4)
MAGNESIUM SERPL-MCNC: 2.31 MG/DL (ref 1.6–2.4)
MAGNESIUM SERPL-MCNC: 2.33 MG/DL (ref 1.6–2.4)
MAGNESIUM SERPL-MCNC: 2.33 MG/DL (ref 1.6–2.4)
MAGNESIUM SERPL-MCNC: 2.34 MG/DL (ref 1.6–2.4)
MAGNESIUM SERPL-MCNC: 2.38 MG/DL (ref 1.6–2.4)
MAGNESIUM SERPL-MCNC: 2.39 MG/DL (ref 1.6–2.4)
MAGNESIUM SERPL-MCNC: 2.39 MG/DL (ref 1.6–2.4)
MAGNESIUM SERPL-MCNC: 2.4 MG/DL (ref 1.6–2.4)
MAGNESIUM SERPL-MCNC: 2.41 MG/DL (ref 1.6–2.4)
MAGNESIUM SERPL-MCNC: 2.42 MG/DL (ref 1.6–2.4)
MAGNESIUM SERPL-MCNC: 2.43 MG/DL (ref 1.6–2.4)
MAGNESIUM SERPL-MCNC: 2.44 MG/DL (ref 1.6–2.4)
MAGNESIUM SERPL-MCNC: 2.45 MG/DL (ref 1.6–2.4)
MAGNESIUM SERPL-MCNC: 2.45 MG/DL (ref 1.6–2.4)
MAGNESIUM SERPL-MCNC: 2.46 MG/DL (ref 1.6–2.4)
MAGNESIUM SERPL-MCNC: 2.47 MG/DL (ref 1.6–2.4)
MAGNESIUM SERPL-MCNC: 2.47 MG/DL (ref 1.6–2.4)
MAGNESIUM SERPL-MCNC: 2.49 MG/DL (ref 1.6–2.4)
MAGNESIUM SERPL-MCNC: 2.5 MG/DL (ref 1.6–2.4)
MAGNESIUM SERPL-MCNC: 2.5 MG/DL (ref 1.6–2.4)
MAGNESIUM SERPL-MCNC: 2.51 MG/DL (ref 1.6–2.4)
MAGNESIUM SERPL-MCNC: 2.52 MG/DL (ref 1.6–2.4)
MAGNESIUM SERPL-MCNC: 2.53 MG/DL (ref 1.6–2.4)
MAGNESIUM SERPL-MCNC: 2.53 MG/DL (ref 1.6–2.4)
MAGNESIUM SERPL-MCNC: 2.57 MG/DL (ref 1.6–2.4)
MAGNESIUM SERPL-MCNC: 2.59 MG/DL (ref 1.6–2.4)
MAGNESIUM SERPL-MCNC: 2.6 MG/DL (ref 1.6–2.4)
MAGNESIUM SERPL-MCNC: 2.61 MG/DL (ref 1.6–2.4)
MAGNESIUM SERPL-MCNC: 2.61 MG/DL (ref 1.6–2.4)
MAGNESIUM SERPL-MCNC: 2.62 MG/DL (ref 1.6–2.4)
MAGNESIUM SERPL-MCNC: 2.63 MG/DL (ref 1.6–2.4)
MAGNESIUM SERPL-MCNC: 2.68 MG/DL (ref 1.6–2.4)
MAGNESIUM SERPL-MCNC: 2.69 MG/DL (ref 1.6–2.4)
MAGNESIUM SERPL-MCNC: 2.7 MG/DL (ref 1.6–2.4)
MAGNESIUM SERPL-MCNC: 2.73 MG/DL (ref 1.6–2.4)
MAGNESIUM SERPL-MCNC: 2.76 MG/DL (ref 1.6–2.4)
MAGNESIUM SERPL-MCNC: 2.77 MG/DL (ref 1.6–2.4)
MAGNESIUM SERPL-MCNC: 2.79 MG/DL (ref 1.6–2.4)
MAGNESIUM SERPL-MCNC: 2.8 MG/DL (ref 1.6–2.4)
MAGNESIUM SERPL-MCNC: 2.81 MG/DL (ref 1.6–2.4)
MAGNESIUM SERPL-MCNC: 2.82 MG/DL (ref 1.6–2.4)
MAGNESIUM SERPL-MCNC: 2.84 MG/DL (ref 1.6–2.4)
MAGNESIUM SERPL-MCNC: 2.85 MG/DL (ref 1.6–2.4)
MAGNESIUM SERPL-MCNC: 2.88 MG/DL (ref 1.6–2.4)
MAGNESIUM SERPL-MCNC: 2.88 MG/DL (ref 1.6–2.4)
MAGNESIUM SERPL-MCNC: 2.91 MG/DL (ref 1.6–2.4)
MAGNESIUM SERPL-MCNC: 2.96 MG/DL (ref 1.6–2.4)
MAGNESIUM SERPL-MCNC: 3.04 MG/DL (ref 1.6–2.4)
MCF BLD TEG: 12 MM (ref 15–32)
MCF BLD TEG: 15 MM (ref 15–32)
MCF BLD TEG: 47.3 MM (ref 52–70)
MCF BLD TEG: 48 MM (ref 52–70)
MCF BLD TEG: 51 MM (ref 52–69)
MCF BLD TEG: 53.2 MM (ref 52–70)
MCF BLD TEG: 9.9 MM (ref 15–32)
MCF BLD TEG: <40 MM (ref 52–69)
MCH RBC QN AUTO: 26.2 PG (ref 26–34)
MCH RBC QN AUTO: 26.8 PG (ref 26–34)
MCH RBC QN AUTO: 26.8 PG (ref 26–34)
MCH RBC QN AUTO: 26.9 PG (ref 26–34)
MCH RBC QN AUTO: 26.9 PG (ref 26–34)
MCH RBC QN AUTO: 27 PG (ref 26–34)
MCH RBC QN AUTO: 27 PG (ref 26–34)
MCH RBC QN AUTO: 27.2 PG (ref 26–34)
MCH RBC QN AUTO: 27.3 PG (ref 26–34)
MCH RBC QN AUTO: 27.3 PG (ref 26–34)
MCH RBC QN AUTO: 27.4 PG (ref 26–34)
MCH RBC QN AUTO: 27.5 PG (ref 26–34)
MCH RBC QN AUTO: 27.6 PG (ref 26–34)
MCH RBC QN AUTO: 27.7 PG (ref 26–34)
MCH RBC QN AUTO: 27.8 PG (ref 26–34)
MCH RBC QN AUTO: 27.9 PG (ref 26–34)
MCH RBC QN AUTO: 28 PG (ref 26–34)
MCH RBC QN AUTO: 28.1 PG (ref 26–34)
MCH RBC QN AUTO: 28.1 PG (ref 26–34)
MCH RBC QN AUTO: 28.2 PG (ref 26–34)
MCH RBC QN AUTO: 28.3 PG (ref 26–34)
MCH RBC QN AUTO: 28.4 PG (ref 26–34)
MCH RBC QN AUTO: 28.4 PG (ref 26–34)
MCH RBC QN AUTO: 28.5 PG (ref 26–34)
MCH RBC QN AUTO: 28.6 PG (ref 26–34)
MCH RBC QN AUTO: 28.7 PG (ref 26–34)
MCH RBC QN AUTO: 28.7 PG (ref 26–34)
MCH RBC QN AUTO: 28.8 PG (ref 26–34)
MCH RBC QN AUTO: 28.8 PG (ref 26–34)
MCH RBC QN AUTO: 28.9 PG (ref 26–34)
MCH RBC QN AUTO: 29 PG (ref 26–34)
MCH RBC QN AUTO: 29.1 PG (ref 26–34)
MCH RBC QN AUTO: 29.2 PG (ref 26–34)
MCH RBC QN AUTO: 29.3 PG (ref 26–34)
MCH RBC QN AUTO: 29.4 PG (ref 26–34)
MCH RBC QN AUTO: 29.5 PG (ref 26–34)
MCH RBC QN AUTO: 29.6 PG (ref 26–34)
MCH RBC QN AUTO: 29.7 PG (ref 26–34)
MCH RBC QN AUTO: 29.8 PG (ref 26–34)
MCH RBC QN AUTO: 29.9 PG (ref 26–34)
MCH RBC QN AUTO: 30 PG (ref 26–34)
MCH RBC QN AUTO: 30.1 PG (ref 26–34)
MCH RBC QN AUTO: 30.1 PG (ref 26–34)
MCH RBC QN AUTO: 30.2 PG (ref 26–34)
MCH RBC QN AUTO: 30.3 PG (ref 26–34)
MCH RBC QN AUTO: 30.4 PG (ref 26–34)
MCH RBC QN AUTO: 30.5 PG (ref 26–34)
MCH RBC QN AUTO: 30.6 PG (ref 26–34)
MCH RBC QN AUTO: 30.7 PG (ref 26–34)
MCH RBC QN AUTO: 30.8 PG (ref 26–34)
MCH RBC QN AUTO: 30.9 PG (ref 26–34)
MCH RBC QN AUTO: 30.9 PG (ref 26–34)
MCH RBC QN AUTO: 31 PG (ref 26–34)
MCH RBC QN AUTO: 31.1 PG (ref 26–34)
MCH RBC QN AUTO: 31.2 PG (ref 26–34)
MCH RBC QN AUTO: 31.3 PG (ref 26–34)
MCH RBC QN AUTO: 31.3 PG (ref 26–34)
MCH RBC QN AUTO: 31.4 PG (ref 26–34)
MCH RBC QN AUTO: 31.4 PG (ref 26–34)
MCH RBC QN AUTO: 31.5 PG (ref 26–34)
MCH RBC QN AUTO: 31.8 PG (ref 26–34)
MCH RBC QN AUTO: 34.7 PG (ref 26–34)
MCHC RBC AUTO-ENTMCNC: 30.8 G/DL (ref 32–36)
MCHC RBC AUTO-ENTMCNC: 30.8 G/DL (ref 32–36)
MCHC RBC AUTO-ENTMCNC: 30.9 G/DL (ref 32–36)
MCHC RBC AUTO-ENTMCNC: 31 G/DL (ref 32–36)
MCHC RBC AUTO-ENTMCNC: 31 G/DL (ref 32–36)
MCHC RBC AUTO-ENTMCNC: 31.1 G/DL (ref 32–36)
MCHC RBC AUTO-ENTMCNC: 31.2 G/DL (ref 32–36)
MCHC RBC AUTO-ENTMCNC: 31.2 G/DL (ref 32–36)
MCHC RBC AUTO-ENTMCNC: 31.3 G/DL (ref 32–36)
MCHC RBC AUTO-ENTMCNC: 31.3 G/DL (ref 32–36)
MCHC RBC AUTO-ENTMCNC: 31.4 G/DL (ref 32–36)
MCHC RBC AUTO-ENTMCNC: 31.5 G/DL (ref 32–36)
MCHC RBC AUTO-ENTMCNC: 31.5 G/DL (ref 32–36)
MCHC RBC AUTO-ENTMCNC: 31.6 G/DL (ref 32–36)
MCHC RBC AUTO-ENTMCNC: 31.7 G/DL (ref 32–36)
MCHC RBC AUTO-ENTMCNC: 31.8 G/DL (ref 32–36)
MCHC RBC AUTO-ENTMCNC: 31.8 G/DL (ref 32–36)
MCHC RBC AUTO-ENTMCNC: 31.9 G/DL (ref 32–36)
MCHC RBC AUTO-ENTMCNC: 32 G/DL (ref 32–36)
MCHC RBC AUTO-ENTMCNC: 32.1 G/DL (ref 32–36)
MCHC RBC AUTO-ENTMCNC: 32.1 G/DL (ref 32–36)
MCHC RBC AUTO-ENTMCNC: 32.2 G/DL (ref 32–36)
MCHC RBC AUTO-ENTMCNC: 32.3 G/DL (ref 32–36)
MCHC RBC AUTO-ENTMCNC: 32.5 G/DL (ref 32–36)
MCHC RBC AUTO-ENTMCNC: 32.5 G/DL (ref 32–36)
MCHC RBC AUTO-ENTMCNC: 32.6 G/DL (ref 32–36)
MCHC RBC AUTO-ENTMCNC: 32.7 G/DL (ref 32–36)
MCHC RBC AUTO-ENTMCNC: 32.7 G/DL (ref 32–36)
MCHC RBC AUTO-ENTMCNC: 32.8 G/DL (ref 32–36)
MCHC RBC AUTO-ENTMCNC: 32.9 G/DL (ref 32–36)
MCHC RBC AUTO-ENTMCNC: 33 G/DL (ref 32–36)
MCHC RBC AUTO-ENTMCNC: 33.1 G/DL (ref 32–36)
MCHC RBC AUTO-ENTMCNC: 33.2 G/DL (ref 32–36)
MCHC RBC AUTO-ENTMCNC: 33.3 G/DL (ref 32–36)
MCHC RBC AUTO-ENTMCNC: 33.5 G/DL (ref 32–36)
MCHC RBC AUTO-ENTMCNC: 33.6 G/DL (ref 32–36)
MCHC RBC AUTO-ENTMCNC: 33.6 G/DL (ref 32–36)
MCHC RBC AUTO-ENTMCNC: 33.7 G/DL (ref 32–36)
MCHC RBC AUTO-ENTMCNC: 33.8 G/DL (ref 32–36)
MCHC RBC AUTO-ENTMCNC: 33.9 G/DL (ref 32–36)
MCHC RBC AUTO-ENTMCNC: 34 G/DL (ref 32–36)
MCHC RBC AUTO-ENTMCNC: 34.1 G/DL (ref 32–36)
MCHC RBC AUTO-ENTMCNC: 34.2 G/DL (ref 32–36)
MCHC RBC AUTO-ENTMCNC: 34.3 G/DL (ref 32–36)
MCHC RBC AUTO-ENTMCNC: 34.3 G/DL (ref 32–36)
MCHC RBC AUTO-ENTMCNC: 34.4 G/DL (ref 32–36)
MCHC RBC AUTO-ENTMCNC: 34.4 G/DL (ref 32–36)
MCHC RBC AUTO-ENTMCNC: 34.5 G/DL (ref 32–36)
MCHC RBC AUTO-ENTMCNC: 34.6 G/DL (ref 32–36)
MCHC RBC AUTO-ENTMCNC: 34.7 G/DL (ref 32–36)
MCHC RBC AUTO-ENTMCNC: 34.8 G/DL (ref 32–36)
MCHC RBC AUTO-ENTMCNC: 34.8 G/DL (ref 32–36)
MCHC RBC AUTO-ENTMCNC: 34.9 G/DL (ref 32–36)
MCHC RBC AUTO-ENTMCNC: 35.1 G/DL (ref 32–36)
MCHC RBC AUTO-ENTMCNC: 35.1 G/DL (ref 32–36)
MCHC RBC AUTO-ENTMCNC: 35.2 G/DL (ref 32–36)
MCHC RBC AUTO-ENTMCNC: 35.2 G/DL (ref 32–36)
MCHC RBC AUTO-ENTMCNC: 35.3 G/DL (ref 32–36)
MCHC RBC AUTO-ENTMCNC: 35.3 G/DL (ref 32–36)
MCHC RBC AUTO-ENTMCNC: 35.5 G/DL (ref 32–36)
MCHC RBC AUTO-ENTMCNC: 35.6 G/DL (ref 32–36)
MCHC RBC AUTO-ENTMCNC: 35.7 G/DL (ref 32–36)
MCHC RBC AUTO-ENTMCNC: 35.9 G/DL (ref 32–36)
MCHC RBC AUTO-ENTMCNC: 36.1 G/DL (ref 32–36)
MCHC RBC AUTO-ENTMCNC: 36.2 G/DL (ref 32–36)
MCHC RBC AUTO-ENTMCNC: 36.2 G/DL (ref 32–36)
MCHC RBC AUTO-ENTMCNC: 36.3 G/DL (ref 32–36)
MCHC RBC AUTO-ENTMCNC: 36.9 G/DL (ref 32–36)
MCHC RBC AUTO-ENTMCNC: 37.2 G/DL (ref 32–36)
MCHC RBC AUTO-ENTMCNC: 37.3 G/DL (ref 32–36)
MCHC RBC AUTO-ENTMCNC: 37.9 G/DL (ref 32–36)
MCHC RBC AUTO-ENTMCNC: 41.8 G/DL (ref 32–36)
MCV RBC AUTO: 101 FL (ref 80–100)
MCV RBC AUTO: 77 FL (ref 80–100)
MCV RBC AUTO: 77 FL (ref 80–100)
MCV RBC AUTO: 78 FL (ref 80–100)
MCV RBC AUTO: 79 FL (ref 80–100)
MCV RBC AUTO: 79 FL (ref 80–100)
MCV RBC AUTO: 80 FL (ref 80–100)
MCV RBC AUTO: 81 FL (ref 80–100)
MCV RBC AUTO: 82 FL (ref 80–100)
MCV RBC AUTO: 83 FL (ref 80–100)
MCV RBC AUTO: 84 FL (ref 80–100)
MCV RBC AUTO: 85 FL (ref 80–100)
MCV RBC AUTO: 86 FL (ref 80–100)
MCV RBC AUTO: 87 FL (ref 80–100)
MCV RBC AUTO: 88 FL (ref 80–100)
MCV RBC AUTO: 89 FL (ref 80–100)
MCV RBC AUTO: 90 FL (ref 80–100)
MCV RBC AUTO: 91 FL (ref 80–100)
MCV RBC AUTO: 92 FL (ref 80–100)
MCV RBC AUTO: 93 FL (ref 80–100)
MCV RBC AUTO: 94 FL (ref 80–100)
MCV RBC AUTO: 95 FL (ref 80–100)
MCV RBC AUTO: 95 FL (ref 80–100)
MCV RBC AUTO: 98 FL (ref 80–100)
MCV RBC AUTO: 98 FL (ref 80–100)
MCV RBC AUTO: 99 FL (ref 80–100)
METAMYELOCYTES # BLD MANUAL: 0.04 X10*3/UL
METAMYELOCYTES # BLD MANUAL: 0.09 X10*3/UL
METAMYELOCYTES # BLD MANUAL: 0.6 X10*3/UL
METAMYELOCYTES # BLD MANUAL: 2.04 X10*3/UL
METAMYELOCYTES NFR BLD MANUAL: 0.9 %
METAMYELOCYTES NFR BLD MANUAL: 1.7 %
METAMYELOCYTES NFR BLD MANUAL: 2.6 %
METAMYELOCYTES NFR BLD MANUAL: 9.4 %
METAPNEUMOVIRUS , VIRC: NOT DETECTED
METAPNEUMOVIRUS , VIRC: NOT DETECTED
METHGB MFR BLDA: 0.2 % (ref 0–1.5)
METHGB MFR BLDA: 0.3 % (ref 0–1.5)
METHGB MFR BLDA: 0.6 % (ref 0–1.5)
METHGB MFR BLDV: 0.8 % (ref 0–1.5)
MEV IGG SER QL IA: POSITIVE
MGC ASCENT PFT - FEV1 - PRE: 1.68
MGC ASCENT PFT - FEV1 - PREDICTED: 2.56
MGC ASCENT PFT - FVC - PRE: 1.92
MGC ASCENT PFT - FVC - PREDICTED: 2.98
MITRAL VALVE E/E' RATIO: 11.18
MONOCYTES # BLD AUTO: 0.26 X10*3/UL (ref 0.1–1)
MONOCYTES # BLD AUTO: 0.28 X10*3/UL (ref 0.1–1)
MONOCYTES # BLD AUTO: 0.3 X10*3/UL (ref 0.1–1)
MONOCYTES # BLD AUTO: 0.3 X10*3/UL (ref 0.1–1)
MONOCYTES # BLD AUTO: 0.31 X10*3/UL (ref 0.1–1)
MONOCYTES # BLD AUTO: 0.32 X10*3/UL (ref 0.1–1)
MONOCYTES # BLD AUTO: 0.37 X10*3/UL (ref 0.1–1)
MONOCYTES # BLD AUTO: 0.38 X10*3/UL (ref 0.1–1)
MONOCYTES # BLD AUTO: 0.38 X10*3/UL (ref 0.1–1)
MONOCYTES # BLD AUTO: 0.51 X10*3/UL (ref 0.1–1)
MONOCYTES # BLD AUTO: 0.55 X10*3/UL (ref 0.1–1)
MONOCYTES # BLD AUTO: 0.56 X10*3/UL (ref 0.1–1)
MONOCYTES # BLD AUTO: 0.56 X10*3/UL (ref 0.1–1)
MONOCYTES # BLD AUTO: 0.65 X10*3/UL (ref 0.1–1)
MONOCYTES # BLD AUTO: 0.67 X10*3/UL (ref 0.1–1)
MONOCYTES # BLD AUTO: 0.69 X10*3/UL (ref 0.1–1)
MONOCYTES # BLD AUTO: 0.7 X10*3/UL (ref 0.1–1)
MONOCYTES # BLD AUTO: 0.82 X10*3/UL (ref 0.1–1)
MONOCYTES # BLD AUTO: 0.9 X10*3/UL (ref 0.1–1)
MONOCYTES # BLD AUTO: 0.91 X10*3/UL (ref 0.1–1)
MONOCYTES # BLD AUTO: 0.93 X10*3/UL (ref 0.1–1)
MONOCYTES # BLD AUTO: 0.98 X10*3/UL (ref 0.1–1)
MONOCYTES # BLD AUTO: 1.04 X10*3/UL (ref 0.1–1)
MONOCYTES # BLD AUTO: 1.15 X10*3/UL (ref 0.1–1)
MONOCYTES # BLD AUTO: 1.17 X10*3/UL (ref 0.1–1)
MONOCYTES # BLD AUTO: 1.3 X10*3/UL (ref 0.1–1)
MONOCYTES # BLD AUTO: 1.45 X10*3/UL (ref 0.1–1)
MONOCYTES # BLD AUTO: 1.62 X10*3/UL (ref 0.1–1)
MONOCYTES # BLD MANUAL: 0.12 X10*3/UL (ref 0.1–1)
MONOCYTES # BLD MANUAL: 0.19 X10*3/UL (ref 0.1–1)
MONOCYTES # BLD MANUAL: 0.21 X10*3/UL (ref 0.1–1)
MONOCYTES # BLD MANUAL: 0.23 X10*3/UL (ref 0.1–1)
MONOCYTES # BLD MANUAL: 0.25 X10*3/UL (ref 0.1–1)
MONOCYTES # BLD MANUAL: 0.27 X10*3/UL (ref 0.1–1)
MONOCYTES # BLD MANUAL: 0.28 X10*3/UL (ref 0.1–1)
MONOCYTES # BLD MANUAL: 0.3 X10*3/UL (ref 0.1–1)
MONOCYTES # BLD MANUAL: 0.42 X10*3/UL (ref 0.1–1)
MONOCYTES # BLD MANUAL: 0.43 X10*3/UL (ref 0.1–1)
MONOCYTES # BLD MANUAL: 0.43 X10*3/UL (ref 0.1–1)
MONOCYTES # BLD MANUAL: 0.49 X10*3/UL (ref 0.1–1)
MONOCYTES # BLD MANUAL: 0.52 X10*3/UL (ref 0.1–1)
MONOCYTES # BLD MANUAL: 0.63 X10*3/UL (ref 0.1–1)
MONOCYTES # BLD MANUAL: 0.63 X10*3/UL (ref 0.1–1)
MONOCYTES # BLD MANUAL: 0.65 X10*3/UL (ref 0.1–1)
MONOCYTES # BLD MANUAL: 0.71 X10*3/UL (ref 0.1–1)
MONOCYTES # BLD MANUAL: 0.73 X10*3/UL (ref 0.1–1)
MONOCYTES # BLD MANUAL: 0.76 X10*3/UL (ref 0.1–1)
MONOCYTES # BLD MANUAL: 0.84 X10*3/UL (ref 0.1–1)
MONOCYTES # BLD MANUAL: 0.87 X10*3/UL (ref 0.1–1)
MONOCYTES # BLD MANUAL: 1.08 X10*3/UL (ref 0.1–1)
MONOCYTES # BLD MANUAL: 1.59 X10*3/UL (ref 0.1–1)
MONOCYTES # BLD MANUAL: 2.39 X10*3/UL (ref 0.1–1)
MONOCYTES NFR BLD AUTO: 10.5 %
MONOCYTES NFR BLD AUTO: 11.1 %
MONOCYTES NFR BLD AUTO: 11.2 %
MONOCYTES NFR BLD AUTO: 12.3 %
MONOCYTES NFR BLD AUTO: 12.5 %
MONOCYTES NFR BLD AUTO: 13.5 %
MONOCYTES NFR BLD AUTO: 13.6 %
MONOCYTES NFR BLD AUTO: 14.9 %
MONOCYTES NFR BLD AUTO: 15 %
MONOCYTES NFR BLD AUTO: 15.5 %
MONOCYTES NFR BLD AUTO: 15.9 %
MONOCYTES NFR BLD AUTO: 15.9 %
MONOCYTES NFR BLD AUTO: 3.4 %
MONOCYTES NFR BLD AUTO: 3.8 %
MONOCYTES NFR BLD AUTO: 4.1 %
MONOCYTES NFR BLD AUTO: 4.1 %
MONOCYTES NFR BLD AUTO: 4.5 %
MONOCYTES NFR BLD AUTO: 5.7 %
MONOCYTES NFR BLD AUTO: 6.6 %
MONOCYTES NFR BLD AUTO: 7.2 %
MONOCYTES NFR BLD AUTO: 7.5 %
MONOCYTES NFR BLD AUTO: 7.9 %
MONOCYTES NFR BLD AUTO: 8.4 %
MONOCYTES NFR BLD AUTO: 8.5 %
MONOCYTES NFR BLD AUTO: 8.8 %
MONOCYTES NFR BLD AUTO: 9.3 %
MONOCYTES NFR BLD AUTO: 9.7 %
MONOCYTES NFR BLD AUTO: 9.9 %
MONOCYTES NFR BLD MANUAL: 0.9 %
MONOCYTES NFR BLD MANUAL: 1.7 %
MONOCYTES NFR BLD MANUAL: 10 %
MONOCYTES NFR BLD MANUAL: 11 %
MONOCYTES NFR BLD MANUAL: 12.9 %
MONOCYTES NFR BLD MANUAL: 17.7 %
MONOCYTES NFR BLD MANUAL: 3 %
MONOCYTES NFR BLD MANUAL: 3.4 %
MONOCYTES NFR BLD MANUAL: 3.5 %
MONOCYTES NFR BLD MANUAL: 3.6 %
MONOCYTES NFR BLD MANUAL: 4.4 %
MONOCYTES NFR BLD MANUAL: 4.5 %
MONOCYTES NFR BLD MANUAL: 5.2 %
MONOCYTES NFR BLD MANUAL: 5.2 %
MONOCYTES NFR BLD MANUAL: 6.2 %
MONOCYTES NFR BLD MANUAL: 6.8 %
MONOCYTES NFR BLD MANUAL: 6.9 %
MONOCYTES NFR BLD MANUAL: 7.1 %
MONOCYTES NFR BLD MANUAL: 8.5 %
MONOCYTES NFR BLD MANUAL: 8.6 %
MONOCYTES NFR BLD MANUAL: 9.4 %
MRSA DNA SPEC QL NAA+PROBE: NOT DETECTED
MRSA DNA SPEC QL NAA+PROBE: NOT DETECTED
MUCOUS THREADS #/AREA URNS AUTO: ABNORMAL /LPF
MUCOUS THREADS #/AREA URNS AUTO: ABNORMAL /LPF
MUMPS IGG ANTIBODY INDEX: 2.9 IA
MUV IGG SER IA-ACNC: POSITIVE
MYCOPLASMA.PNEUMONIAE PCR, VIRC: NOT DETECTED
MYELOCYTES # BLD MANUAL: 0.05 X10*3/UL
MYELOCYTES # BLD MANUAL: 0.05 X10*3/UL
MYELOCYTES # BLD MANUAL: 0.07 X10*3/UL
MYELOCYTES # BLD MANUAL: 0.16 X10*3/UL
MYELOCYTES # BLD MANUAL: 0.16 X10*3/UL
MYELOCYTES # BLD MANUAL: 0.17 X10*3/UL
MYELOCYTES # BLD MANUAL: 0.2 X10*3/UL
MYELOCYTES # BLD MANUAL: 0.32 X10*3/UL
MYELOCYTES # BLD MANUAL: 0.57 X10*3/UL
MYELOCYTES # BLD MANUAL: 0.58 X10*3/UL
MYELOCYTES # BLD MANUAL: 0.67 X10*3/UL
MYELOCYTES # BLD MANUAL: 1.05 X10*3/UL
MYELOCYTES NFR BLD MANUAL: 0.9 %
MYELOCYTES NFR BLD MANUAL: 0.9 %
MYELOCYTES NFR BLD MANUAL: 1 %
MYELOCYTES NFR BLD MANUAL: 1.7 %
MYELOCYTES NFR BLD MANUAL: 1.8 %
MYELOCYTES NFR BLD MANUAL: 2.6 %
MYELOCYTES NFR BLD MANUAL: 2.6 %
MYELOCYTES NFR BLD MANUAL: 2.7 %
MYELOCYTES NFR BLD MANUAL: 3.1 %
MYELOCYTES NFR BLD MANUAL: 3.4 %
MYELOCYTES NFR BLD MANUAL: 4 %
MYELOCYTES NFR BLD MANUAL: 4.5 %
NEUTROPHILS # BLD AUTO: 1.02 X10*3/UL (ref 1.2–7.7)
NEUTROPHILS # BLD AUTO: 1.1 X10*3/UL (ref 1.2–7.7)
NEUTROPHILS # BLD AUTO: 1.67 X10*3/UL (ref 1.2–7.7)
NEUTROPHILS # BLD AUTO: 10.38 X10*3/UL (ref 1.2–7.7)
NEUTROPHILS # BLD AUTO: 11.36 X10*3/UL (ref 1.2–7.7)
NEUTROPHILS # BLD AUTO: 3.08 X10*3/UL (ref 1.2–7.7)
NEUTROPHILS # BLD AUTO: 3.95 X10*3/UL (ref 1.2–7.7)
NEUTROPHILS # BLD AUTO: 4.39 X10*3/UL (ref 1.2–7.7)
NEUTROPHILS # BLD AUTO: 4.43 X10*3/UL (ref 1.2–7.7)
NEUTROPHILS # BLD AUTO: 5.08 X10*3/UL (ref 1.2–7.7)
NEUTROPHILS # BLD AUTO: 5.24 X10*3/UL (ref 1.2–7.7)
NEUTROPHILS # BLD AUTO: 5.38 X10*3/UL (ref 1.2–7.7)
NEUTROPHILS # BLD AUTO: 5.39 X10*3/UL (ref 1.2–7.7)
NEUTROPHILS # BLD AUTO: 5.42 X10*3/UL (ref 1.2–7.7)
NEUTROPHILS # BLD AUTO: 5.45 X10*3/UL (ref 1.2–7.7)
NEUTROPHILS # BLD AUTO: 5.49 X10*3/UL (ref 1.2–7.7)
NEUTROPHILS # BLD AUTO: 5.65 X10*3/UL (ref 1.2–7.7)
NEUTROPHILS # BLD AUTO: 5.7 X10*3/UL (ref 1.2–7.7)
NEUTROPHILS # BLD AUTO: 5.85 X10*3/UL (ref 1.2–7.7)
NEUTROPHILS # BLD AUTO: 6.16 X10*3/UL (ref 1.2–7.7)
NEUTROPHILS # BLD AUTO: 6.39 X10*3/UL (ref 1.2–7.7)
NEUTROPHILS # BLD AUTO: 6.84 X10*3/UL (ref 1.2–7.7)
NEUTROPHILS # BLD AUTO: 7.07 X10*3/UL (ref 1.2–7.7)
NEUTROPHILS # BLD AUTO: 7.36 X10*3/UL (ref 1.2–7.7)
NEUTROPHILS # BLD AUTO: 7.68 X10*3/UL (ref 1.2–7.7)
NEUTROPHILS # BLD AUTO: 7.85 X10*3/UL (ref 1.2–7.7)
NEUTROPHILS # BLD AUTO: 8.39 X10*3/UL (ref 1.2–7.7)
NEUTROPHILS # BLD AUTO: 9.13 X10*3/UL (ref 1.2–7.7)
NEUTROPHILS # BLD MANUAL: 10.81 X10*3/UL (ref 1.2–7.7)
NEUTROPHILS # BLD MANUAL: 11.23 X10*3/UL (ref 1.2–7.7)
NEUTROPHILS # BLD MANUAL: 12.6 X10*3/UL (ref 1.2–7.7)
NEUTROPHILS # BLD MANUAL: 13.01 X10*3/UL (ref 1.2–7.7)
NEUTROPHILS # BLD MANUAL: 15.93 X10*3/UL (ref 1.2–7.7)
NEUTROPHILS # BLD MANUAL: 20.3 X10*3/UL (ref 1.2–7.7)
NEUTROPHILS # BLD MANUAL: 3.66 X10*3/UL (ref 1.2–7.7)
NEUTROPHILS # BLD MANUAL: 3.67 X10*3/UL (ref 1.2–7.7)
NEUTROPHILS # BLD MANUAL: 4.16 X10*3/UL (ref 1.2–7.7)
NEUTROPHILS # BLD MANUAL: 4.48 X10*3/UL (ref 1.2–7.7)
NEUTROPHILS # BLD MANUAL: 6.77 X10*3/UL (ref 1.2–7.7)
NEUTROPHILS NFR BLD AUTO: 30.5 %
NEUTROPHILS NFR BLD AUTO: 33.4 %
NEUTROPHILS NFR BLD AUTO: 44.2 %
NEUTROPHILS NFR BLD AUTO: 61.5 %
NEUTROPHILS NFR BLD AUTO: 70.8 %
NEUTROPHILS NFR BLD AUTO: 71 %
NEUTROPHILS NFR BLD AUTO: 72.5 %
NEUTROPHILS NFR BLD AUTO: 72.9 %
NEUTROPHILS NFR BLD AUTO: 73 %
NEUTROPHILS NFR BLD AUTO: 73.6 %
NEUTROPHILS NFR BLD AUTO: 74.1 %
NEUTROPHILS NFR BLD AUTO: 76.5 %
NEUTROPHILS NFR BLD AUTO: 76.8 %
NEUTROPHILS NFR BLD AUTO: 77.3 %
NEUTROPHILS NFR BLD AUTO: 79.2 %
NEUTROPHILS NFR BLD AUTO: 80.8 %
NEUTROPHILS NFR BLD AUTO: 81.5 %
NEUTROPHILS NFR BLD AUTO: 82.9 %
NEUTROPHILS NFR BLD AUTO: 83.4 %
NEUTROPHILS NFR BLD AUTO: 83.7 %
NEUTROPHILS NFR BLD AUTO: 83.9 %
NEUTROPHILS NFR BLD AUTO: 84.5 %
NEUTROPHILS NFR BLD AUTO: 86.2 %
NEUTROPHILS NFR BLD AUTO: 86.4 %
NEUTROPHILS NFR BLD AUTO: 86.5 %
NEUTROPHILS NFR BLD AUTO: 88.9 %
NEUTROPHILS NFR BLD AUTO: 89 %
NEUTROPHILS NFR BLD AUTO: 89.7 %
NEUTS BAND # BLD MANUAL: 0.12 X10*3/UL (ref 0–0.7)
NEUTS BAND # BLD MANUAL: 0.13 X10*3/UL (ref 0–0.7)
NEUTS BAND # BLD MANUAL: 0.14 X10*3/UL (ref 0–0.7)
NEUTS BAND # BLD MANUAL: 0.18 X10*3/UL (ref 0–0.7)
NEUTS BAND # BLD MANUAL: 0.22 X10*3/UL (ref 0–0.7)
NEUTS BAND # BLD MANUAL: 0.27 X10*3/UL (ref 0–0.7)
NEUTS BAND # BLD MANUAL: 0.58 X10*3/UL (ref 0–0.7)
NEUTS BAND # BLD MANUAL: 0.59 X10*3/UL (ref 0–0.7)
NEUTS BAND # BLD MANUAL: 0.95 X10*3/UL (ref 0–0.7)
NEUTS BAND # BLD MANUAL: 1.69 X10*3/UL (ref 0–0.7)
NEUTS BAND # BLD MANUAL: 2.79 X10*3/UL (ref 0–0.7)
NEUTS BAND NFR BLD MANUAL: 0.8 %
NEUTS BAND NFR BLD MANUAL: 1 %
NEUTS BAND NFR BLD MANUAL: 2.6 %
NEUTS BAND NFR BLD MANUAL: 2.7 %
NEUTS BAND NFR BLD MANUAL: 22.3 %
NEUTS BAND NFR BLD MANUAL: 4.3 %
NEUTS BAND NFR BLD MANUAL: 4.3 %
NEUTS BAND NFR BLD MANUAL: 4.4 %
NEUTS BAND NFR BLD MANUAL: 7.7 %
NEUTS BAND NFR BLD MANUAL: 7.8 %
NEUTS BAND NFR BLD MANUAL: 8.6 %
NEUTS SEG # BLD MANUAL: 12.43 X10*3/UL (ref 1.2–7)
NEUTS SEG # BLD MANUAL: 12.46 X10*3/UL (ref 1.2–7)
NEUTS SEG # BLD MANUAL: 13.46 X10*3/UL (ref 1.2–7)
NEUTS SEG # BLD MANUAL: 14.24 X10*3/UL (ref 1.2–7)
NEUTS SEG # BLD MANUAL: 17.17 X10*3/UL (ref 1.2–7)
NEUTS SEG # BLD MANUAL: 18.73 X10*3/UL (ref 1.2–7)
NEUTS SEG # BLD MANUAL: 19.82 X10*3/UL (ref 1.2–7)
NEUTS SEG # BLD MANUAL: 20.12 X10*3/UL (ref 1.2–7)
NEUTS SEG # BLD MANUAL: 3.27 X10*3/UL (ref 1.2–7)
NEUTS SEG # BLD MANUAL: 3.54 X10*3/UL (ref 1.2–7)
NEUTS SEG # BLD MANUAL: 3.54 X10*3/UL (ref 1.2–7)
NEUTS SEG # BLD MANUAL: 3.89 X10*3/UL (ref 1.2–7)
NEUTS SEG # BLD MANUAL: 3.94 X10*3/UL (ref 1.2–7)
NEUTS SEG # BLD MANUAL: 4.26 X10*3/UL (ref 1.2–7)
NEUTS SEG # BLD MANUAL: 4.91 X10*3/UL (ref 1.2–7)
NEUTS SEG # BLD MANUAL: 6.13 X10*3/UL (ref 1.2–7)
NEUTS SEG # BLD MANUAL: 6.18 X10*3/UL (ref 1.2–7)
NEUTS SEG # BLD MANUAL: 6.47 X10*3/UL (ref 1.2–7)
NEUTS SEG # BLD MANUAL: 6.48 X10*3/UL (ref 1.2–7)
NEUTS SEG # BLD MANUAL: 6.94 X10*3/UL (ref 1.2–7)
NEUTS SEG # BLD MANUAL: 7.96 X10*3/UL (ref 1.2–7)
NEUTS SEG # BLD MANUAL: 8.34 X10*3/UL (ref 1.2–7)
NEUTS SEG # BLD MANUAL: 8.44 X10*3/UL (ref 1.2–7)
NEUTS SEG # BLD MANUAL: 9.86 X10*3/UL (ref 1.2–7)
NEUTS SEG NFR BLD MANUAL: 63.7 %
NEUTS SEG NFR BLD MANUAL: 65.6 %
NEUTS SEG NFR BLD MANUAL: 67.5 %
NEUTS SEG NFR BLD MANUAL: 70.7 %
NEUTS SEG NFR BLD MANUAL: 78.6 %
NEUTS SEG NFR BLD MANUAL: 80.3 %
NEUTS SEG NFR BLD MANUAL: 80.5 %
NEUTS SEG NFR BLD MANUAL: 81.8 %
NEUTS SEG NFR BLD MANUAL: 81.9 %
NEUTS SEG NFR BLD MANUAL: 83.6 %
NEUTS SEG NFR BLD MANUAL: 84 %
NEUTS SEG NFR BLD MANUAL: 84.6 %
NEUTS SEG NFR BLD MANUAL: 84.7 %
NEUTS SEG NFR BLD MANUAL: 87.1 %
NEUTS SEG NFR BLD MANUAL: 87.1 %
NEUTS SEG NFR BLD MANUAL: 88.8 %
NEUTS SEG NFR BLD MANUAL: 89.3 %
NEUTS SEG NFR BLD MANUAL: 89.4 %
NEUTS SEG NFR BLD MANUAL: 89.7 %
NEUTS SEG NFR BLD MANUAL: 91.4 %
NEUTS SEG NFR BLD MANUAL: 92.3 %
NEUTS SEG NFR BLD MANUAL: 93.9 %
NEUTS SEG NFR BLD MANUAL: 94.1 %
NEUTS SEG NFR BLD MANUAL: 96.5 %
NIL(NEG) CONTROL SPOT COUNT: NORMAL
NITRITE UR QL STRIP.AUTO: NEGATIVE
NON HDL CHOLESTEROL: 123 MG/DL (ref 0–149)
NRBC BLD-RTO: 0 /100 WBCS (ref 0–0)
NRBC BLD-RTO: 0.1 /100 WBCS (ref 0–0)
NRBC BLD-RTO: 0.2 /100 WBCS (ref 0–0)
NRBC BLD-RTO: 0.3 /100 WBCS (ref 0–0)
NRBC BLD-RTO: 0.3 /100 WBCS (ref 0–0)
NRBC BLD-RTO: 0.4 /100 WBCS (ref 0–0)
NRBC BLD-RTO: 0.4 /100 WBCS (ref 0–0)
NRBC BLD-RTO: 0.5 /100 WBCS (ref 0–0)
NRBC BLD-RTO: 0.6 /100 WBCS (ref 0–0)
NRBC BLD-RTO: 0.6 /100 WBCS (ref 0–0)
NRBC BLD-RTO: 0.8 /100 WBCS (ref 0–0)
NRBC BLD-RTO: 0.8 /100 WBCS (ref 0–0)
NRBC BLD-RTO: 0.9 /100 WBCS (ref 0–0)
NRBC BLD-RTO: 1.2 /100 WBCS (ref 0–0)
NRBC BLD-RTO: 1.5 /100 WBCS (ref 0–0)
NRBC BLD-RTO: 1.6 /100 WBCS (ref 0–0)
NRBC BLD-RTO: 1.7 /100 WBCS (ref 0–0)
NRBC BLD-RTO: 1.8 /100 WBCS (ref 0–0)
NRBC BLD-RTO: 10 /100 WBCS (ref 0–0)
NRBC BLD-RTO: 10.1 /100 WBCS (ref 0–0)
NRBC BLD-RTO: 10.3 /100 WBCS (ref 0–0)
NRBC BLD-RTO: 10.5 /100 WBCS (ref 0–0)
NRBC BLD-RTO: 10.7 /100 WBCS (ref 0–0)
NRBC BLD-RTO: 10.8 /100 WBCS (ref 0–0)
NRBC BLD-RTO: 11.1 /100 WBCS (ref 0–0)
NRBC BLD-RTO: 11.3 /100 WBCS (ref 0–0)
NRBC BLD-RTO: 11.7 /100 WBCS (ref 0–0)
NRBC BLD-RTO: 11.7 /100 WBCS (ref 0–0)
NRBC BLD-RTO: 12.1 /100 WBCS (ref 0–0)
NRBC BLD-RTO: 13.1 /100 WBCS (ref 0–0)
NRBC BLD-RTO: 13.1 /100 WBCS (ref 0–0)
NRBC BLD-RTO: 13.4 /100 WBCS (ref 0–0)
NRBC BLD-RTO: 14.1 /100 WBCS (ref 0–0)
NRBC BLD-RTO: 14.8 /100 WBCS (ref 0–0)
NRBC BLD-RTO: 14.8 /100 WBCS (ref 0–0)
NRBC BLD-RTO: 14.9 /100 WBCS (ref 0–0)
NRBC BLD-RTO: 15.1 /100 WBCS (ref 0–0)
NRBC BLD-RTO: 16 /100 WBCS (ref 0–0)
NRBC BLD-RTO: 16.4 /100 WBCS (ref 0–0)
NRBC BLD-RTO: 16.9 /100 WBCS (ref 0–0)
NRBC BLD-RTO: 16.9 /100 WBCS (ref 0–0)
NRBC BLD-RTO: 17.1 /100 WBCS (ref 0–0)
NRBC BLD-RTO: 17.6 /100 WBCS (ref 0–0)
NRBC BLD-RTO: 17.8 /100 WBCS (ref 0–0)
NRBC BLD-RTO: 17.9 /100 WBCS (ref 0–0)
NRBC BLD-RTO: 18.1 /100 WBCS (ref 0–0)
NRBC BLD-RTO: 18.3 /100 WBCS (ref 0–0)
NRBC BLD-RTO: 18.8 /100 WBCS (ref 0–0)
NRBC BLD-RTO: 19 /100 WBCS (ref 0–0)
NRBC BLD-RTO: 19.5 /100 WBCS (ref 0–0)
NRBC BLD-RTO: 2 /100 WBCS (ref 0–0)
NRBC BLD-RTO: 2.1 /100 WBCS (ref 0–0)
NRBC BLD-RTO: 2.2 /100 WBCS (ref 0–0)
NRBC BLD-RTO: 2.2 /100 WBCS (ref 0–0)
NRBC BLD-RTO: 2.3 /100 WBCS (ref 0–0)
NRBC BLD-RTO: 2.5 /100 WBCS (ref 0–0)
NRBC BLD-RTO: 2.6 /100 WBCS (ref 0–0)
NRBC BLD-RTO: 2.7 /100 WBCS (ref 0–0)
NRBC BLD-RTO: 2.7 /100 WBCS (ref 0–0)
NRBC BLD-RTO: 2.8 /100 WBCS (ref 0–0)
NRBC BLD-RTO: 2.8 /100 WBCS (ref 0–0)
NRBC BLD-RTO: 2.9 /100 WBCS (ref 0–0)
NRBC BLD-RTO: 20.1 /100 WBCS (ref 0–0)
NRBC BLD-RTO: 21.3 /100 WBCS (ref 0–0)
NRBC BLD-RTO: 21.4 /100 WBCS (ref 0–0)
NRBC BLD-RTO: 22.3 /100 WBCS (ref 0–0)
NRBC BLD-RTO: 22.6 /100 WBCS (ref 0–0)
NRBC BLD-RTO: 24 /100 WBCS (ref 0–0)
NRBC BLD-RTO: 24.2 /100 WBCS (ref 0–0)
NRBC BLD-RTO: 27.3 /100 WBCS (ref 0–0)
NRBC BLD-RTO: 3.1 /100 WBCS (ref 0–0)
NRBC BLD-RTO: 3.3 /100 WBCS (ref 0–0)
NRBC BLD-RTO: 3.5 /100 WBCS (ref 0–0)
NRBC BLD-RTO: 3.6 /100 WBCS (ref 0–0)
NRBC BLD-RTO: 3.7 /100 WBCS (ref 0–0)
NRBC BLD-RTO: 3.8 /100 WBCS (ref 0–0)
NRBC BLD-RTO: 3.8 /100 WBCS (ref 0–0)
NRBC BLD-RTO: 37.4 /100 WBCS (ref 0–0)
NRBC BLD-RTO: 37.4 /100 WBCS (ref 0–0)
NRBC BLD-RTO: 4 /100 WBCS (ref 0–0)
NRBC BLD-RTO: 4.4 /100 WBCS (ref 0–0)
NRBC BLD-RTO: 4.4 /100 WBCS (ref 0–0)
NRBC BLD-RTO: 4.6 /100 WBCS (ref 0–0)
NRBC BLD-RTO: 4.8 /100 WBCS (ref 0–0)
NRBC BLD-RTO: 4.9 /100 WBCS (ref 0–0)
NRBC BLD-RTO: 41.7 /100 WBCS (ref 0–0)
NRBC BLD-RTO: 5.2 /100 WBCS (ref 0–0)
NRBC BLD-RTO: 5.2 /100 WBCS (ref 0–0)
NRBC BLD-RTO: 5.3 /100 WBCS (ref 0–0)
NRBC BLD-RTO: 5.4 /100 WBCS (ref 0–0)
NRBC BLD-RTO: 5.5 /100 WBCS (ref 0–0)
NRBC BLD-RTO: 5.7 /100 WBCS (ref 0–0)
NRBC BLD-RTO: 6.4 /100 WBCS (ref 0–0)
NRBC BLD-RTO: 6.8 /100 WBCS (ref 0–0)
NRBC BLD-RTO: 6.9 /100 WBCS (ref 0–0)
NRBC BLD-RTO: 7.3 /100 WBCS (ref 0–0)
NRBC BLD-RTO: 7.5 /100 WBCS (ref 0–0)
NRBC BLD-RTO: 7.7 /100 WBCS (ref 0–0)
NRBC BLD-RTO: 7.7 /100 WBCS (ref 0–0)
NRBC BLD-RTO: 7.8 /100 WBCS (ref 0–0)
NRBC BLD-RTO: 8 /100 WBCS (ref 0–0)
NRBC BLD-RTO: 8.4 /100 WBCS (ref 0–0)
NRBC BLD-RTO: 8.6 /100 WBCS (ref 0–0)
NRBC BLD-RTO: 8.7 /100 WBCS (ref 0–0)
NRBC BLD-RTO: 8.7 /100 WBCS (ref 0–0)
NRBC BLD-RTO: 88.8 /100 WBCS (ref 0–0)
NRBC BLD-RTO: 9.2 /100 WBCS (ref 0–0)
NRBC BLD-RTO: 9.3 /100 WBCS (ref 0–0)
NRBC BLD-RTO: 9.3 /100 WBCS (ref 0–0)
NRBC BLD-RTO: 9.4 /100 WBCS (ref 0–0)
NRBC BLD-RTO: 9.5 /100 WBCS (ref 0–0)
NUGENT SCORE: 4
OSMOLALITY SERPL: 293 MOSM/KG (ref 280–300)
OSMOLALITY UR: 336 MOSM/KG (ref 200–1200)
OVALOCYTES BLD QL SMEAR: ABNORMAL
OVALOCYTES BLD QL SMEAR: NORMAL
OXYHGB MFR BLDA: 87.9 % (ref 94–98)
OXYHGB MFR BLDA: 89.7 % (ref 94–98)
OXYHGB MFR BLDA: 90.2 %
OXYHGB MFR BLDA: 90.2 %
OXYHGB MFR BLDA: 90.2 % (ref 94–98)
OXYHGB MFR BLDA: 90.5 % (ref 94–98)
OXYHGB MFR BLDA: 90.5 % (ref 94–98)
OXYHGB MFR BLDA: 90.7 % (ref 94–98)
OXYHGB MFR BLDA: 90.8 % (ref 94–98)
OXYHGB MFR BLDA: 90.8 % (ref 94–98)
OXYHGB MFR BLDA: 91.1 % (ref 94–98)
OXYHGB MFR BLDA: 91.3 % (ref 94–98)
OXYHGB MFR BLDA: 91.5 % (ref 94–98)
OXYHGB MFR BLDA: 91.5 % (ref 94–98)
OXYHGB MFR BLDA: 91.7 % (ref 94–98)
OXYHGB MFR BLDA: 91.8 % (ref 94–98)
OXYHGB MFR BLDA: 92 %
OXYHGB MFR BLDA: 92 % (ref 94–98)
OXYHGB MFR BLDA: 92.1 % (ref 94–98)
OXYHGB MFR BLDA: 92.2 % (ref 94–98)
OXYHGB MFR BLDA: 92.3 %
OXYHGB MFR BLDA: 92.7 % (ref 94–98)
OXYHGB MFR BLDA: 92.7 % (ref 94–98)
OXYHGB MFR BLDA: 93.5 % (ref 94–98)
OXYHGB MFR BLDA: 93.6 % (ref 94–98)
OXYHGB MFR BLDA: 93.6 % (ref 94–98)
OXYHGB MFR BLDA: 93.7 % (ref 94–98)
OXYHGB MFR BLDA: 93.7 % (ref 94–98)
OXYHGB MFR BLDA: 93.9 % (ref 94–98)
OXYHGB MFR BLDA: 93.9 % (ref 94–98)
OXYHGB MFR BLDA: 94 %
OXYHGB MFR BLDA: 94 % (ref 94–98)
OXYHGB MFR BLDA: 94.1 % (ref 94–98)
OXYHGB MFR BLDA: 94.1 % (ref 94–98)
OXYHGB MFR BLDA: 94.2 % (ref 94–98)
OXYHGB MFR BLDA: 94.2 % (ref 94–98)
OXYHGB MFR BLDA: 94.3 %
OXYHGB MFR BLDA: 94.3 % (ref 94–98)
OXYHGB MFR BLDA: 94.4 %
OXYHGB MFR BLDA: 94.4 % (ref 94–98)
OXYHGB MFR BLDA: 94.7 % (ref 94–98)
OXYHGB MFR BLDA: 94.8 % (ref 94–98)
OXYHGB MFR BLDA: 94.8 % (ref 94–98)
OXYHGB MFR BLDA: 94.9 %
OXYHGB MFR BLDA: 95 %
OXYHGB MFR BLDA: 95.1 % (ref 94–98)
OXYHGB MFR BLDA: 95.2 % (ref 94–98)
OXYHGB MFR BLDA: 95.3 % (ref 94–98)
OXYHGB MFR BLDA: 95.4 %
OXYHGB MFR BLDA: 95.5 %
OXYHGB MFR BLDA: 95.5 % (ref 94–98)
OXYHGB MFR BLDA: 95.5 % (ref 94–98)
OXYHGB MFR BLDA: 95.6 %
OXYHGB MFR BLDA: 95.6 % (ref 94–98)
OXYHGB MFR BLDA: 95.6 % (ref 94–98)
OXYHGB MFR BLDA: 95.7 %
OXYHGB MFR BLDA: 95.7 %
OXYHGB MFR BLDA: 95.7 % (ref 94–98)
OXYHGB MFR BLDA: 95.8 % (ref 94–98)
OXYHGB MFR BLDA: 95.9 %
OXYHGB MFR BLDA: 95.9 % (ref 94–98)
OXYHGB MFR BLDA: 96 % (ref 94–98)
OXYHGB MFR BLDA: 96.1 % (ref 94–98)
OXYHGB MFR BLDA: 96.2 %
OXYHGB MFR BLDA: 96.2 % (ref 94–98)
OXYHGB MFR BLDA: 96.2 % (ref 94–98)
OXYHGB MFR BLDA: 96.3 % (ref 94–98)
OXYHGB MFR BLDA: 96.5 %
OXYHGB MFR BLDA: 96.5 % (ref 94–98)
OXYHGB MFR BLDA: 96.6 %
OXYHGB MFR BLDA: 96.6 % (ref 94–98)
OXYHGB MFR BLDA: 96.7 %
OXYHGB MFR BLDA: 96.7 %
OXYHGB MFR BLDA: 96.7 % (ref 94–98)
OXYHGB MFR BLDA: 96.8 %
OXYHGB MFR BLDA: 96.8 % (ref 94–98)
OXYHGB MFR BLDA: 96.9 % (ref 94–98)
OXYHGB MFR BLDA: 96.9 % (ref 94–98)
OXYHGB MFR BLDA: 97 %
OXYHGB MFR BLDA: 97 % (ref 94–98)
OXYHGB MFR BLDA: 97.1 %
OXYHGB MFR BLDA: 97.1 % (ref 94–98)
OXYHGB MFR BLDA: 97.2 %
OXYHGB MFR BLDA: 97.2 % (ref 94–98)
OXYHGB MFR BLDA: 97.4 % (ref 94–98)
OXYHGB MFR BLDA: 97.5 %
OXYHGB MFR BLDA: 97.5 % (ref 94–98)
OXYHGB MFR BLDA: 97.6 % (ref 94–98)
OXYHGB MFR BLDA: 97.7 %
OXYHGB MFR BLDA: 97.7 % (ref 94–98)
OXYHGB MFR BLDA: 97.8 %
OXYHGB MFR BLDA: 97.8 %
OXYHGB MFR BLDA: 97.8 % (ref 94–98)
OXYHGB MFR BLDA: 97.9 %
OXYHGB MFR BLDA: 97.9 % (ref 94–98)
OXYHGB MFR BLDA: 98 %
OXYHGB MFR BLDA: 98 %
OXYHGB MFR BLDA: 98 % (ref 94–98)
OXYHGB MFR BLDA: 98.1 % (ref 94–98)
OXYHGB MFR BLDA: 98.2 % (ref 94–98)
OXYHGB MFR BLDA: 98.3 % (ref 94–98)
OXYHGB MFR BLDA: 98.3 % (ref 94–98)
OXYHGB MFR BLDA: 98.5 % (ref 94–98)
OXYHGB MFR BLDA: NORMAL %
OXYHGB MFR BLDA: NORMAL %
OXYHGB MFR BLDMV: 31.6 % (ref 45–75)
OXYHGB MFR BLDMV: 36.4 % (ref 45–75)
OXYHGB MFR BLDMV: 36.8 % (ref 45–75)
OXYHGB MFR BLDMV: 39.7 % (ref 45–75)
OXYHGB MFR BLDMV: 40.1 % (ref 45–75)
OXYHGB MFR BLDMV: 41.4 % (ref 45–75)
OXYHGB MFR BLDMV: 41.9 % (ref 45–75)
OXYHGB MFR BLDMV: 44.7 % (ref 45–75)
OXYHGB MFR BLDMV: 46.3 % (ref 45–75)
OXYHGB MFR BLDMV: 46.6 % (ref 45–75)
OXYHGB MFR BLDMV: 46.9 % (ref 45–75)
OXYHGB MFR BLDMV: 47.5 % (ref 45–75)
OXYHGB MFR BLDMV: 49.3 % (ref 45–75)
OXYHGB MFR BLDMV: 50.3 % (ref 45–75)
OXYHGB MFR BLDMV: 52.8 % (ref 45–75)
OXYHGB MFR BLDMV: 53.2 % (ref 45–75)
OXYHGB MFR BLDMV: 53.5 % (ref 45–75)
OXYHGB MFR BLDMV: 53.6 % (ref 45–75)
OXYHGB MFR BLDMV: 53.9 % (ref 45–75)
OXYHGB MFR BLDMV: 54.2 % (ref 45–75)
OXYHGB MFR BLDMV: 54.6 % (ref 45–75)
OXYHGB MFR BLDMV: 54.7 % (ref 45–75)
OXYHGB MFR BLDMV: 55.6 % (ref 45–75)
OXYHGB MFR BLDMV: 55.9 % (ref 45–75)
OXYHGB MFR BLDMV: 56 % (ref 45–75)
OXYHGB MFR BLDMV: 57.1 % (ref 45–75)
OXYHGB MFR BLDMV: 58.3 % (ref 45–75)
OXYHGB MFR BLDMV: 58.5 % (ref 45–75)
OXYHGB MFR BLDMV: 59 % (ref 45–75)
OXYHGB MFR BLDMV: 59.3 % (ref 45–75)
OXYHGB MFR BLDMV: 60 % (ref 45–75)
OXYHGB MFR BLDMV: 60.1 % (ref 45–75)
OXYHGB MFR BLDMV: 61.6 % (ref 45–75)
OXYHGB MFR BLDMV: 62.8 % (ref 45–75)
OXYHGB MFR BLDMV: 63.1 % (ref 45–75)
OXYHGB MFR BLDMV: 64.8 % (ref 45–75)
OXYHGB MFR BLDMV: 65 % (ref 45–75)
OXYHGB MFR BLDMV: 68.1 % (ref 45–75)
OXYHGB MFR BLDMV: 69.3 % (ref 45–75)
OXYHGB MFR BLDMV: 71.5 % (ref 45–75)
OXYHGB MFR BLDMV: 73.1 % (ref 45–75)
OXYHGB MFR BLDMV: 78.6 % (ref 45–75)
OXYHGB MFR BLDMV: 80 % (ref 45–75)
OXYHGB MFR BLDV: 11.4 % (ref 45–75)
OXYHGB MFR BLDV: 29.2 % (ref 45–75)
OXYHGB MFR BLDV: 38.1 %
OXYHGB MFR BLDV: 39.1 %
OXYHGB MFR BLDV: 39.5 %
OXYHGB MFR BLDV: 46 %
OXYHGB MFR BLDV: 48.4 %
OXYHGB MFR BLDV: 49 % (ref 45–75)
OXYHGB MFR BLDV: 50.9 % (ref 45–75)
OXYHGB MFR BLDV: 52.7 %
OXYHGB MFR BLDV: 53.7 %
OXYHGB MFR BLDV: 54.9 %
OXYHGB MFR BLDV: 56.9 %
OXYHGB MFR BLDV: 61.1 %
OXYHGB MFR BLDV: 63.2 % (ref 45–75)
OXYHGB MFR BLDV: 66.8 %
OXYHGB MFR BLDV: 67.3 %
OXYHGB MFR BLDV: 68.9 %
OXYHGB MFR BLDV: 69 % (ref 45–75)
OXYHGB MFR BLDV: 70.3 %
OXYHGB MFR BLDV: 71 % (ref 45–75)
OXYHGB MFR BLDV: 71.3 %
OXYHGB MFR BLDV: 71.9 %
OXYHGB MFR BLDV: 72 %
OXYHGB MFR BLDV: 73.9 % (ref 45–75)
OXYHGB MFR BLDV: 74.6 %
OXYHGB MFR BLDV: 76.5 %
OXYHGB MFR BLDV: 77.7 %
OXYHGB MFR BLDV: 79.3 %
OXYHGB MFR BLDV: 79.8 %
OXYHGB MFR BLDV: 79.8 %
OXYHGB MFR BLDV: 80.1 %
OXYHGB MFR BLDV: 80.3 %
OXYHGB MFR BLDV: 81.8 %
OXYHGB MFR BLDV: 82 %
OXYHGB MFR BLDV: 82.8 %
OXYHGB MFR BLDV: 84.6 %
OXYHGB MFR BLDV: 86.6 %
OXYHGB MFR BLDV: 86.6 %
OXYHGB MFR BLDV: 88.3 % (ref 45–75)
OXYHGB MFR BLDV: 90 %
OXYHGB MFR BLDV: 90.2 %
OXYHGB MFR BLDV: 93.7 %
OXYHGB MFR BLDV: 96.1 %
OXYHGB MFR BLDV: 96.1 %
OXYHGB MFR BLDV: 96.2 %
P AXIS: 17 DEGREES
P AXIS: 38 DEGREES
P AXIS: 39 DEGREES
P AXIS: 49 DEGREES
P AXIS: 59 DEGREES
P AXIS: 71 DEGREES
P OFFSET: 206 MS
P OFFSET: 208 MS
P OFFSET: 212 MS
P OFFSET: 219 MS
P OFFSET: 221 MS
P ONSET: 154 MS
P ONSET: 156 MS
P ONSET: 159 MS
P ONSET: 160 MS
P ONSET: 163 MS
PAN.LEGIONELLA PCR, VIRC: NOT DETECTED
PANEL A SPOT COUNT: 0
PANEL B SPOT COUNT: 0
PAPPENHEIMER BOD BLD QL SMEAR: PRESENT
PAPPENHEIMER BOD BLD QL SMEAR: PRESENT
PARAINFLUENZA PCR, VIRC: NOT DETECTED
PARAINFLUENZA PCR, VIRC: NOT DETECTED
PATH REPORT.FINAL DX SPEC: NORMAL
PATH REPORT.FINAL DX SPEC: NORMAL
PATH REPORT.GROSS SPEC: NORMAL
PATH REPORT.GROSS SPEC: NORMAL
PATH REPORT.RELEVANT HX SPEC: NORMAL
PATH REPORT.RELEVANT HX SPEC: NORMAL
PATH REPORT.TOTAL CANCER: NORMAL
PATH REPORT.TOTAL CANCER: NORMAL
PATH REVIEW-CBC DIFFERENTIAL: NORMAL
PCO2 BLDA: 20 MM HG (ref 38–42)
PCO2 BLDA: 21 MM HG (ref 38–42)
PCO2 BLDA: 24 MM HG (ref 38–42)
PCO2 BLDA: 25 MM HG (ref 38–42)
PCO2 BLDA: 26 MM HG (ref 38–42)
PCO2 BLDA: 27 MM HG (ref 38–42)
PCO2 BLDA: 28 MM HG
PCO2 BLDA: 28 MM HG (ref 38–42)
PCO2 BLDA: 28 MM HG (ref 38–42)
PCO2 BLDA: 29 MM HG (ref 38–42)
PCO2 BLDA: 30 MM HG
PCO2 BLDA: 30 MM HG (ref 38–42)
PCO2 BLDA: 31 MM HG (ref 38–42)
PCO2 BLDA: 32 MM HG
PCO2 BLDA: 32 MM HG (ref 38–42)
PCO2 BLDA: 33 MM HG (ref 38–42)
PCO2 BLDA: 34 MM HG
PCO2 BLDA: 34 MM HG (ref 38–42)
PCO2 BLDA: 35 MM HG (ref 38–42)
PCO2 BLDA: 36 MM HG
PCO2 BLDA: 36 MM HG (ref 38–42)
PCO2 BLDA: 37 MM HG (ref 38–42)
PCO2 BLDA: 38 MM HG
PCO2 BLDA: 38 MM HG (ref 38–42)
PCO2 BLDA: 39 MM HG (ref 38–42)
PCO2 BLDA: 40 MM HG
PCO2 BLDA: 40 MM HG
PCO2 BLDA: 40 MM HG (ref 38–42)
PCO2 BLDA: 41 MM HG
PCO2 BLDA: 41 MM HG
PCO2 BLDA: 41 MM HG (ref 38–42)
PCO2 BLDA: 42 MM HG
PCO2 BLDA: 42 MM HG
PCO2 BLDA: 42 MM HG (ref 38–42)
PCO2 BLDA: 43 MM HG (ref 38–42)
PCO2 BLDA: 44 MM HG
PCO2 BLDA: 44 MM HG (ref 38–42)
PCO2 BLDA: 45 MM HG
PCO2 BLDA: 45 MM HG (ref 38–42)
PCO2 BLDA: 46 MM HG (ref 38–42)
PCO2 BLDA: 47 MM HG
PCO2 BLDA: 47 MM HG (ref 38–42)
PCO2 BLDA: 48 MM HG (ref 38–42)
PCO2 BLDA: 51 MM HG
PCO2 BLDA: 51 MM HG (ref 38–42)
PCO2 BLDA: 52 MM HG
PCO2 BLDA: 52 MM HG (ref 38–42)
PCO2 BLDA: 53 MM HG
PCO2 BLDA: 54 MM HG
PCO2 BLDA: 54 MM HG (ref 38–42)
PCO2 BLDA: 56 MM HG
PCO2 BLDA: 56 MM HG
PCO2 BLDA: 56 MM HG (ref 38–42)
PCO2 BLDA: 57 MM HG
PCO2 BLDA: 57 MM HG
PCO2 BLDA: 58 MM HG (ref 38–42)
PCO2 BLDA: 58 MM HG (ref 38–42)
PCO2 BLDA: 59 MM HG (ref 38–42)
PCO2 BLDA: 60 MM HG
PCO2 BLDA: 60 MM HG
PCO2 BLDA: 60 MM HG (ref 38–42)
PCO2 BLDA: 61 MM HG
PCO2 BLDA: 63 MM HG
PCO2 BLDA: 63 MM HG (ref 38–42)
PCO2 BLDA: 63 MM HG (ref 38–42)
PCO2 BLDA: 65 MM HG
PCO2 BLDA: 65 MM HG
PCO2 BLDA: 69 MM HG
PCO2 BLDA: 70 MM HG
PCO2 BLDA: 70 MM HG
PCO2 BLDMV: 29 MM HG (ref 41–51)
PCO2 BLDMV: 32 MM HG (ref 41–51)
PCO2 BLDMV: 33 MM HG (ref 41–51)
PCO2 BLDMV: 34 MM HG (ref 41–51)
PCO2 BLDMV: 35 MM HG (ref 41–51)
PCO2 BLDMV: 36 MM HG (ref 41–51)
PCO2 BLDMV: 37 MM HG (ref 41–51)
PCO2 BLDMV: 38 MM HG (ref 41–51)
PCO2 BLDMV: 39 MM HG (ref 41–51)
PCO2 BLDMV: 40 MM HG (ref 41–51)
PCO2 BLDMV: 42 MM HG (ref 41–51)
PCO2 BLDMV: 43 MM HG (ref 41–51)
PCO2 BLDMV: 44 MM HG (ref 41–51)
PCO2 BLDMV: 48 MM HG (ref 41–51)
PCO2 BLDMV: 54 MM HG (ref 41–51)
PCO2 BLDV: 33 MM HG (ref 41–51)
PCO2 BLDV: 34 MM HG
PCO2 BLDV: 34 MM HG (ref 41–51)
PCO2 BLDV: 38 MM HG (ref 41–51)
PCO2 BLDV: 38 MM HG (ref 41–51)
PCO2 BLDV: 39 MM HG (ref 41–51)
PCO2 BLDV: 39 MM HG (ref 41–51)
PCO2 BLDV: 40 MM HG
PCO2 BLDV: 40 MM HG
PCO2 BLDV: 40 MM HG (ref 41–51)
PCO2 BLDV: 41 MM HG
PCO2 BLDV: 41 MM HG
PCO2 BLDV: 42 MM HG (ref 41–51)
PCO2 BLDV: 44 MM HG (ref 41–51)
PCO2 BLDV: 45 MM HG
PCO2 BLDV: 45 MM HG
PCO2 BLDV: 46 MM HG
PCO2 BLDV: 47 MM HG
PCO2 BLDV: 48 MM HG
PCO2 BLDV: 48 MM HG
PCO2 BLDV: 49 MM HG
PCO2 BLDV: 53 MM HG
PCO2 BLDV: 54 MM HG
PCO2 BLDV: 55 MM HG
PCO2 BLDV: 56 MM HG
PCO2 BLDV: 57 MM HG
PCO2 BLDV: 58 MM HG
PCO2 BLDV: 59 MM HG
PCO2 BLDV: 62 MM HG
PCO2 BLDV: 63 MM HG
PCO2 BLDV: 63 MM HG
PCO2 BLDV: 64 MM HG
PCO2 BLDV: 65 MM HG
PCO2 BLDV: 66 MM HG
PCO2 BLDV: 67 MM HG
PCO2 BLDV: 67 MM HG
PCO2 BLDV: 70 MM HG
PCO2 BLDV: 71 MM HG
PEEP CMH2O: 10 CM H2O
PH BLDA: 7.16 PH (ref 7.38–7.42)
PH BLDA: 7.17 PH
PH BLDA: 7.17 PH
PH BLDA: 7.19 PH
PH BLDA: 7.19 PH
PH BLDA: 7.19 PH (ref 7.38–7.42)
PH BLDA: 7.2 PH
PH BLDA: 7.2 PH (ref 7.38–7.42)
PH BLDA: 7.22 PH
PH BLDA: 7.22 PH
PH BLDA: 7.22 PH (ref 7.38–7.42)
PH BLDA: 7.25 PH (ref 7.38–7.42)
PH BLDA: 7.27 PH
PH BLDA: 7.27 PH (ref 7.38–7.42)
PH BLDA: 7.27 PH (ref 7.38–7.42)
PH BLDA: 7.28 PH
PH BLDA: 7.28 PH
PH BLDA: 7.28 PH (ref 7.38–7.42)
PH BLDA: 7.29 PH
PH BLDA: 7.29 PH
PH BLDA: 7.29 PH (ref 7.38–7.42)
PH BLDA: 7.3 PH
PH BLDA: 7.31 PH
PH BLDA: 7.31 PH (ref 7.38–7.42)
PH BLDA: 7.32 PH (ref 7.38–7.42)
PH BLDA: 7.33 PH (ref 7.38–7.42)
PH BLDA: 7.33 PH (ref 7.38–7.42)
PH BLDA: 7.34 PH
PH BLDA: 7.34 PH (ref 7.38–7.42)
PH BLDA: 7.35 PH
PH BLDA: 7.35 PH
PH BLDA: 7.35 PH (ref 7.38–7.42)
PH BLDA: 7.36 PH
PH BLDA: 7.36 PH (ref 7.38–7.42)
PH BLDA: 7.37 PH
PH BLDA: 7.37 PH (ref 7.38–7.42)
PH BLDA: 7.38 PH
PH BLDA: 7.38 PH
PH BLDA: 7.38 PH (ref 7.38–7.42)
PH BLDA: 7.39 PH
PH BLDA: 7.39 PH
PH BLDA: 7.39 PH (ref 7.38–7.42)
PH BLDA: 7.4 PH
PH BLDA: 7.4 PH
PH BLDA: 7.4 PH (ref 7.38–7.42)
PH BLDA: 7.41 PH (ref 7.38–7.42)
PH BLDA: 7.42 PH
PH BLDA: 7.42 PH
PH BLDA: 7.42 PH (ref 7.38–7.42)
PH BLDA: 7.43 PH
PH BLDA: 7.43 PH (ref 7.38–7.42)
PH BLDA: 7.44 PH (ref 7.38–7.42)
PH BLDA: 7.45 PH
PH BLDA: 7.45 PH
PH BLDA: 7.45 PH (ref 7.38–7.42)
PH BLDA: 7.46 PH
PH BLDA: 7.46 PH (ref 7.38–7.42)
PH BLDA: 7.47 PH (ref 7.38–7.42)
PH BLDA: 7.47 PH (ref 7.38–7.42)
PH BLDA: 7.48 PH (ref 7.38–7.42)
PH BLDA: 7.5 PH (ref 7.38–7.42)
PH BLDA: 7.51 PH (ref 7.38–7.42)
PH BLDA: 7.52 PH (ref 7.38–7.42)
PH BLDA: 7.54 PH (ref 7.38–7.42)
PH BLDA: 7.56 PH (ref 7.38–7.42)
PH BLDA: 7.58 PH (ref 7.38–7.42)
PH BLDA: 7.66 PH (ref 7.38–7.42)
PH BLDA: 7.67 PH (ref 7.38–7.42)
PH BLDMV: 7.3 PH (ref 7.33–7.43)
PH BLDMV: 7.34 PH (ref 7.33–7.43)
PH BLDMV: 7.34 PH (ref 7.33–7.43)
PH BLDMV: 7.35 PH (ref 7.33–7.43)
PH BLDMV: 7.35 PH (ref 7.33–7.43)
PH BLDMV: 7.36 PH (ref 7.33–7.43)
PH BLDMV: 7.36 PH (ref 7.33–7.43)
PH BLDMV: 7.37 PH (ref 7.33–7.43)
PH BLDMV: 7.38 PH (ref 7.33–7.43)
PH BLDMV: 7.39 PH (ref 7.33–7.43)
PH BLDMV: 7.4 PH (ref 7.33–7.43)
PH BLDMV: 7.41 PH (ref 7.33–7.43)
PH BLDMV: 7.42 PH (ref 7.33–7.43)
PH BLDMV: 7.42 PH (ref 7.33–7.43)
PH BLDMV: 7.43 PH (ref 7.33–7.43)
PH BLDMV: 7.44 PH (ref 7.33–7.43)
PH BLDMV: 7.45 PH (ref 7.33–7.43)
PH BLDMV: 7.46 PH (ref 7.33–7.43)
PH BLDMV: 7.48 PH (ref 7.33–7.43)
PH BLDV: 7.17 PH
PH BLDV: 7.17 PH
PH BLDV: 7.19 PH
PH BLDV: 7.19 PH
PH BLDV: 7.21 PH
PH BLDV: 7.23 PH
PH BLDV: 7.26 PH
PH BLDV: 7.26 PH
PH BLDV: 7.26 PH (ref 7.33–7.43)
PH BLDV: 7.27 PH
PH BLDV: 7.28 PH
PH BLDV: 7.28 PH (ref 7.33–7.43)
PH BLDV: 7.29 PH
PH BLDV: 7.3 PH
PH BLDV: 7.31 PH
PH BLDV: 7.31 PH (ref 7.33–7.43)
PH BLDV: 7.33 PH
PH BLDV: 7.33 PH
PH BLDV: 7.33 PH (ref 7.33–7.43)
PH BLDV: 7.34 PH
PH BLDV: 7.34 PH (ref 7.33–7.43)
PH BLDV: 7.35 PH
PH BLDV: 7.35 PH
PH BLDV: 7.36 PH
PH BLDV: 7.37 PH (ref 7.33–7.43)
PH BLDV: 7.37 PH (ref 7.33–7.43)
PH BLDV: 7.38 PH
PH BLDV: 7.38 PH
PH BLDV: 7.38 PH (ref 7.33–7.43)
PH BLDV: 7.4 PH
PH BLDV: 7.4 PH
PH BLDV: 7.41 PH
PH BLDV: 7.47 PH (ref 7.33–7.43)
PH UR STRIP.AUTO: 5 [PH]
PH UR STRIP.AUTO: 5.5 [PH]
PH UR STRIP.AUTO: 6 [PH]
PH UR STRIP.AUTO: 8.5 [PH]
PHOSPHATE SERPL-MCNC: 1 MG/DL (ref 2.5–4.9)
PHOSPHATE SERPL-MCNC: 1.1 MG/DL (ref 2.5–4.9)
PHOSPHATE SERPL-MCNC: 1.1 MG/DL (ref 2.5–4.9)
PHOSPHATE SERPL-MCNC: 1.3 MG/DL (ref 2.5–4.9)
PHOSPHATE SERPL-MCNC: 1.5 MG/DL (ref 2.5–4.9)
PHOSPHATE SERPL-MCNC: 1.5 MG/DL (ref 2.5–4.9)
PHOSPHATE SERPL-MCNC: 1.6 MG/DL (ref 2.5–4.9)
PHOSPHATE SERPL-MCNC: 1.6 MG/DL (ref 2.5–4.9)
PHOSPHATE SERPL-MCNC: 1.7 MG/DL (ref 2.5–4.9)
PHOSPHATE SERPL-MCNC: 1.8 MG/DL (ref 2.5–4.9)
PHOSPHATE SERPL-MCNC: 1.9 MG/DL (ref 2.5–4.9)
PHOSPHATE SERPL-MCNC: 2 MG/DL (ref 2.5–4.9)
PHOSPHATE SERPL-MCNC: 2 MG/DL (ref 2.5–4.9)
PHOSPHATE SERPL-MCNC: 2.1 MG/DL (ref 2.5–4.9)
PHOSPHATE SERPL-MCNC: 2.1 MG/DL (ref 2.5–4.9)
PHOSPHATE SERPL-MCNC: 2.2 MG/DL (ref 2.5–4.9)
PHOSPHATE SERPL-MCNC: 2.3 MG/DL (ref 2.5–4.9)
PHOSPHATE SERPL-MCNC: 2.4 MG/DL (ref 2.5–4.9)
PHOSPHATE SERPL-MCNC: 2.4 MG/DL (ref 2.5–4.9)
PHOSPHATE SERPL-MCNC: 2.5 MG/DL (ref 2.5–4.9)
PHOSPHATE SERPL-MCNC: 2.6 MG/DL (ref 2.5–4.9)
PHOSPHATE SERPL-MCNC: 2.7 MG/DL (ref 2.5–4.9)
PHOSPHATE SERPL-MCNC: 2.8 MG/DL (ref 2.5–4.9)
PHOSPHATE SERPL-MCNC: 2.9 MG/DL (ref 2.5–4.9)
PHOSPHATE SERPL-MCNC: 2.9 MG/DL (ref 2.5–4.9)
PHOSPHATE SERPL-MCNC: 3 MG/DL (ref 2.5–4.9)
PHOSPHATE SERPL-MCNC: 3.1 MG/DL (ref 2.5–4.9)
PHOSPHATE SERPL-MCNC: 3.1 MG/DL (ref 2.5–4.9)
PHOSPHATE SERPL-MCNC: 3.2 MG/DL (ref 2.5–4.9)
PHOSPHATE SERPL-MCNC: 3.3 MG/DL (ref 2.5–4.9)
PHOSPHATE SERPL-MCNC: 3.4 MG/DL (ref 2.5–4.9)
PHOSPHATE SERPL-MCNC: 3.5 MG/DL (ref 2.5–4.9)
PHOSPHATE SERPL-MCNC: 3.6 MG/DL (ref 2.5–4.9)
PHOSPHATE SERPL-MCNC: 3.7 MG/DL (ref 2.5–4.9)
PHOSPHATE SERPL-MCNC: 3.8 MG/DL (ref 2.5–4.9)
PHOSPHATE SERPL-MCNC: 3.9 MG/DL (ref 2.5–4.9)
PHOSPHATE SERPL-MCNC: 4 MG/DL (ref 2.5–4.9)
PHOSPHATE SERPL-MCNC: 4.2 MG/DL (ref 2.5–4.9)
PHOSPHATE SERPL-MCNC: 4.3 MG/DL (ref 2.5–4.9)
PHOSPHATE SERPL-MCNC: 4.4 MG/DL (ref 2.5–4.9)
PHOSPHATE SERPL-MCNC: 4.4 MG/DL (ref 2.5–4.9)
PHOSPHATE SERPL-MCNC: 4.5 MG/DL (ref 2.5–4.9)
PHOSPHATE SERPL-MCNC: 4.5 MG/DL (ref 2.5–4.9)
PHOSPHATE SERPL-MCNC: 4.6 MG/DL (ref 2.5–4.9)
PHOSPHATE SERPL-MCNC: 4.7 MG/DL (ref 2.5–4.9)
PHOSPHATE SERPL-MCNC: 4.7 MG/DL (ref 2.5–4.9)
PHOSPHATE SERPL-MCNC: 4.8 MG/DL (ref 2.5–4.9)
PHOSPHATE SERPL-MCNC: 4.9 MG/DL (ref 2.5–4.9)
PHOSPHATE SERPL-MCNC: 4.9 MG/DL (ref 2.5–4.9)
PHOSPHATE SERPL-MCNC: 5 MG/DL (ref 2.5–4.9)
PHOSPHATE SERPL-MCNC: 5 MG/DL (ref 2.5–4.9)
PHOSPHATE SERPL-MCNC: 5.4 MG/DL (ref 2.5–4.9)
PHOSPHATE SERPL-MCNC: 5.5 MG/DL (ref 2.5–4.9)
PHOSPHATE SERPL-MCNC: 5.5 MG/DL (ref 2.5–4.9)
PHOSPHATE SERPL-MCNC: 5.8 MG/DL (ref 2.5–4.9)
PHOSPHATE SERPL-MCNC: 5.9 MG/DL (ref 2.5–4.9)
PHOSPHATE SERPL-MCNC: 6 MG/DL (ref 2.5–4.9)
PHOSPHATE SERPL-MCNC: 6.1 MG/DL (ref 2.5–4.9)
PHOSPHATE SERPL-MCNC: 6.3 MG/DL (ref 2.5–4.9)
PHOSPHATE SERPL-MCNC: 6.6 MG/DL (ref 2.5–4.9)
PHOSPHATE SERPL-MCNC: 6.9 MG/DL (ref 2.5–4.9)
PHOSPHATE SERPL-MCNC: 7.3 MG/DL (ref 2.5–4.9)
PHOSPHATE SERPL-MCNC: <1 MG/DL (ref 2.5–4.9)
PLATELET # BLD AUTO: 100 X10*3/UL (ref 150–450)
PLATELET # BLD AUTO: 101 X10*3/UL (ref 150–450)
PLATELET # BLD AUTO: 102 X10*3/UL (ref 150–450)
PLATELET # BLD AUTO: 103 X10*3/UL (ref 150–450)
PLATELET # BLD AUTO: 106 X10*3/UL (ref 150–450)
PLATELET # BLD AUTO: 108 X10*3/UL (ref 150–450)
PLATELET # BLD AUTO: 108 X10*3/UL (ref 150–450)
PLATELET # BLD AUTO: 109 X10*3/UL (ref 150–450)
PLATELET # BLD AUTO: 110 X10*3/UL (ref 150–450)
PLATELET # BLD AUTO: 111 X10*3/UL (ref 150–450)
PLATELET # BLD AUTO: 111 X10*3/UL (ref 150–450)
PLATELET # BLD AUTO: 112 X10*3/UL (ref 150–450)
PLATELET # BLD AUTO: 114 X10*3/UL (ref 150–450)
PLATELET # BLD AUTO: 116 X10*3/UL (ref 150–450)
PLATELET # BLD AUTO: 116 X10*3/UL (ref 150–450)
PLATELET # BLD AUTO: 118 X10*3/UL (ref 150–450)
PLATELET # BLD AUTO: 118 X10*3/UL (ref 150–450)
PLATELET # BLD AUTO: 119 X10*3/UL (ref 150–450)
PLATELET # BLD AUTO: 119 X10*3/UL (ref 150–450)
PLATELET # BLD AUTO: 120 X10*3/UL (ref 150–450)
PLATELET # BLD AUTO: 121 X10*3/UL (ref 150–450)
PLATELET # BLD AUTO: 122 X10*3/UL (ref 150–450)
PLATELET # BLD AUTO: 126 X10*3/UL (ref 150–450)
PLATELET # BLD AUTO: 127 X10*3/UL (ref 150–450)
PLATELET # BLD AUTO: 128 X10*3/UL (ref 150–450)
PLATELET # BLD AUTO: 130 X10*3/UL (ref 150–450)
PLATELET # BLD AUTO: 131 X10*3/UL (ref 150–450)
PLATELET # BLD AUTO: 132 X10*3/UL (ref 150–450)
PLATELET # BLD AUTO: 133 X10*3/UL (ref 150–450)
PLATELET # BLD AUTO: 136 X10*3/UL (ref 150–450)
PLATELET # BLD AUTO: 136 X10*3/UL (ref 150–450)
PLATELET # BLD AUTO: 138 X10*3/UL (ref 150–450)
PLATELET # BLD AUTO: 138 X10*3/UL (ref 150–450)
PLATELET # BLD AUTO: 140 X10*3/UL (ref 150–450)
PLATELET # BLD AUTO: 141 X10*3/UL (ref 150–450)
PLATELET # BLD AUTO: 142 X10*3/UL (ref 150–450)
PLATELET # BLD AUTO: 144 X10*3/UL (ref 150–450)
PLATELET # BLD AUTO: 144 X10*3/UL (ref 150–450)
PLATELET # BLD AUTO: 146 X10*3/UL (ref 150–450)
PLATELET # BLD AUTO: 147 X10*3/UL (ref 150–450)
PLATELET # BLD AUTO: 148 X10*3/UL (ref 150–450)
PLATELET # BLD AUTO: 148 X10*3/UL (ref 150–450)
PLATELET # BLD AUTO: 149 X10*3/UL (ref 150–450)
PLATELET # BLD AUTO: 152 X10*3/UL (ref 150–450)
PLATELET # BLD AUTO: 160 X10*3/UL (ref 150–450)
PLATELET # BLD AUTO: 163 X10*3/UL (ref 150–450)
PLATELET # BLD AUTO: 169 X10*3/UL (ref 150–450)
PLATELET # BLD AUTO: 180 X10*3/UL (ref 150–450)
PLATELET # BLD AUTO: 180 X10*3/UL (ref 150–450)
PLATELET # BLD AUTO: 196 X10*3/UL (ref 150–450)
PLATELET # BLD AUTO: 201 X10*3/UL (ref 150–450)
PLATELET # BLD AUTO: 204 X10*3/UL (ref 150–450)
PLATELET # BLD AUTO: 205 X10*3/UL (ref 150–450)
PLATELET # BLD AUTO: 23 X10*3/UL (ref 150–450)
PLATELET # BLD AUTO: 258 X10*3/UL (ref 150–450)
PLATELET # BLD AUTO: 27 X10*3/UL (ref 150–450)
PLATELET # BLD AUTO: 289 X10*3/UL (ref 150–450)
PLATELET # BLD AUTO: 30 X10*3/UL (ref 150–450)
PLATELET # BLD AUTO: 301 X10*3/UL (ref 150–450)
PLATELET # BLD AUTO: 319 X10*3/UL (ref 150–450)
PLATELET # BLD AUTO: 33 X10*3/UL (ref 150–450)
PLATELET # BLD AUTO: 36 X10*3/UL (ref 150–450)
PLATELET # BLD AUTO: 37 X10*3/UL (ref 150–450)
PLATELET # BLD AUTO: 39 X10*3/UL (ref 150–450)
PLATELET # BLD AUTO: 43 X10*3/UL (ref 150–450)
PLATELET # BLD AUTO: 43 X10*3/UL (ref 150–450)
PLATELET # BLD AUTO: 44 X10*3/UL (ref 150–450)
PLATELET # BLD AUTO: 44 X10*3/UL (ref 150–450)
PLATELET # BLD AUTO: 47 X10*3/UL (ref 150–450)
PLATELET # BLD AUTO: 48 X10*3/UL (ref 150–450)
PLATELET # BLD AUTO: 49 X10*3/UL (ref 150–450)
PLATELET # BLD AUTO: 49 X10*3/UL (ref 150–450)
PLATELET # BLD AUTO: 51 X10*3/UL (ref 150–450)
PLATELET # BLD AUTO: 52 X10*3/UL (ref 150–450)
PLATELET # BLD AUTO: 52 X10*3/UL (ref 150–450)
PLATELET # BLD AUTO: 54 X10*3/UL (ref 150–450)
PLATELET # BLD AUTO: 57 X10*3/UL (ref 150–450)
PLATELET # BLD AUTO: 58 X10*3/UL (ref 150–450)
PLATELET # BLD AUTO: 59 X10*3/UL (ref 150–450)
PLATELET # BLD AUTO: 59 X10*3/UL (ref 150–450)
PLATELET # BLD AUTO: 60 X10*3/UL (ref 150–450)
PLATELET # BLD AUTO: 61 X10*3/UL (ref 150–450)
PLATELET # BLD AUTO: 61 X10*3/UL (ref 150–450)
PLATELET # BLD AUTO: 62 X10*3/UL (ref 150–450)
PLATELET # BLD AUTO: 64 X10*3/UL (ref 150–450)
PLATELET # BLD AUTO: 65 X10*3/UL (ref 150–450)
PLATELET # BLD AUTO: 66 X10*3/UL (ref 150–450)
PLATELET # BLD AUTO: 67 X10*3/UL (ref 150–450)
PLATELET # BLD AUTO: 68 X10*3/UL (ref 150–450)
PLATELET # BLD AUTO: 69 X10*3/UL (ref 150–450)
PLATELET # BLD AUTO: 70 X10*3/UL (ref 150–450)
PLATELET # BLD AUTO: 71 X10*3/UL (ref 150–450)
PLATELET # BLD AUTO: 72 X10*3/UL (ref 150–450)
PLATELET # BLD AUTO: 73 X10*3/UL (ref 150–450)
PLATELET # BLD AUTO: 73 X10*3/UL (ref 150–450)
PLATELET # BLD AUTO: 74 X10*3/UL (ref 150–450)
PLATELET # BLD AUTO: 78 X10*3/UL (ref 150–450)
PLATELET # BLD AUTO: 80 X10*3/UL (ref 150–450)
PLATELET # BLD AUTO: 81 X10*3/UL (ref 150–450)
PLATELET # BLD AUTO: 83 X10*3/UL (ref 150–450)
PLATELET # BLD AUTO: 85 X10*3/UL (ref 150–450)
PLATELET # BLD AUTO: 86 X10*3/UL (ref 150–450)
PLATELET # BLD AUTO: 86 X10*3/UL (ref 150–450)
PLATELET # BLD AUTO: 89 X10*3/UL (ref 150–450)
PLATELET # BLD AUTO: 89 X10*3/UL (ref 150–450)
PLATELET # BLD AUTO: 91 X10*3/UL (ref 150–450)
PLATELET # BLD AUTO: 94 X10*3/UL (ref 150–450)
PLATELET # BLD AUTO: 95 X10*3/UL (ref 150–450)
PLATELET # BLD AUTO: 97 X10*3/UL (ref 150–450)
PLATELET # BLD AUTO: 98 X10*3/UL (ref 150–450)
PLATELET # BLD AUTO: 99 X10*3/UL (ref 150–450)
PLATELET # BLD AUTO: 99 X10*3/UL (ref 150–450)
PLATELETS.RETICULATED NFR BLD AUTO: 13.4 % (ref 1–6)
PNEUMOCYSTIS PCR,QUANTITATIVE (NON-BLOOD SPECIMEN): NOT DETECTED COPIES/ML
PNEUMOCYSTIS PCR,QUANTITATIVE (NON-BLOOD SPECIMEN): NOT DETECTED COPIES/ML
PO2 BLDA: 101 MM HG (ref 85–95)
PO2 BLDA: 102 MM HG (ref 85–95)
PO2 BLDA: 103 MM HG (ref 85–95)
PO2 BLDA: 104 MM HG (ref 85–95)
PO2 BLDA: 105 MM HG (ref 85–95)
PO2 BLDA: 106 MM HG (ref 85–95)
PO2 BLDA: 106 MM HG (ref 85–95)
PO2 BLDA: 107 MM HG (ref 85–95)
PO2 BLDA: 107 MM HG (ref 85–95)
PO2 BLDA: 108 MM HG (ref 85–95)
PO2 BLDA: 109 MM HG (ref 85–95)
PO2 BLDA: 109 MM HG (ref 85–95)
PO2 BLDA: 110 MM HG (ref 85–95)
PO2 BLDA: 110 MM HG (ref 85–95)
PO2 BLDA: 112 MM HG (ref 85–95)
PO2 BLDA: 112 MM HG (ref 85–95)
PO2 BLDA: 115 MM HG (ref 85–95)
PO2 BLDA: 117 MM HG (ref 85–95)
PO2 BLDA: 119 MM HG (ref 85–95)
PO2 BLDA: 120 MM HG (ref 85–95)
PO2 BLDA: 120 MM HG (ref 85–95)
PO2 BLDA: 121 MM HG (ref 85–95)
PO2 BLDA: 123 MM HG (ref 85–95)
PO2 BLDA: 128 MM HG (ref 85–95)
PO2 BLDA: 129 MM HG (ref 85–95)
PO2 BLDA: 132 MM HG (ref 85–95)
PO2 BLDA: 134 MM HG (ref 85–95)
PO2 BLDA: 139 MM HG (ref 85–95)
PO2 BLDA: 139 MM HG (ref 85–95)
PO2 BLDA: 144 MM HG
PO2 BLDA: 146 MM HG (ref 85–95)
PO2 BLDA: 148 MM HG (ref 85–95)
PO2 BLDA: 149 MM HG (ref 85–95)
PO2 BLDA: 152 MM HG (ref 85–95)
PO2 BLDA: 154 MM HG (ref 85–95)
PO2 BLDA: 155 MM HG (ref 85–95)
PO2 BLDA: 155 MM HG (ref 85–95)
PO2 BLDA: 159 MM HG (ref 85–95)
PO2 BLDA: 159 MM HG (ref 85–95)
PO2 BLDA: 163 MM HG (ref 85–95)
PO2 BLDA: 165 MM HG (ref 85–95)
PO2 BLDA: 165 MM HG (ref 85–95)
PO2 BLDA: 166 MM HG (ref 85–95)
PO2 BLDA: 167 MM HG (ref 85–95)
PO2 BLDA: 168 MM HG (ref 85–95)
PO2 BLDA: 168 MM HG (ref 85–95)
PO2 BLDA: 169 MM HG (ref 85–95)
PO2 BLDA: 170 MM HG (ref 85–95)
PO2 BLDA: 171 MM HG (ref 85–95)
PO2 BLDA: 173 MM HG (ref 85–95)
PO2 BLDA: 173 MM HG (ref 85–95)
PO2 BLDA: 174 MM HG (ref 85–95)
PO2 BLDA: 175 MM HG (ref 85–95)
PO2 BLDA: 177 MM HG (ref 85–95)
PO2 BLDA: 177 MM HG (ref 85–95)
PO2 BLDA: 178 MM HG (ref 85–95)
PO2 BLDA: 178 MM HG (ref 85–95)
PO2 BLDA: 182 MM HG
PO2 BLDA: 184 MM HG (ref 85–95)
PO2 BLDA: 185 MM HG (ref 85–95)
PO2 BLDA: 186 MM HG
PO2 BLDA: 186 MM HG (ref 85–95)
PO2 BLDA: 193 MM HG (ref 85–95)
PO2 BLDA: 195 MM HG (ref 85–95)
PO2 BLDA: 198 MM HG (ref 85–95)
PO2 BLDA: 200 MM HG (ref 85–95)
PO2 BLDA: 201 MM HG
PO2 BLDA: 201 MM HG (ref 85–95)
PO2 BLDA: 203 MM HG (ref 85–95)
PO2 BLDA: 205 MM HG (ref 85–95)
PO2 BLDA: 206 MM HG (ref 85–95)
PO2 BLDA: 207 MM HG (ref 85–95)
PO2 BLDA: 207 MM HG (ref 85–95)
PO2 BLDA: 208 MM HG (ref 85–95)
PO2 BLDA: 209 MM HG (ref 85–95)
PO2 BLDA: 212 MM HG (ref 85–95)
PO2 BLDA: 213 MM HG (ref 85–95)
PO2 BLDA: 213 MM HG (ref 85–95)
PO2 BLDA: 214 MM HG (ref 85–95)
PO2 BLDA: 216 MM HG (ref 85–95)
PO2 BLDA: 219 MM HG (ref 85–95)
PO2 BLDA: 220 MM HG (ref 85–95)
PO2 BLDA: 222 MM HG
PO2 BLDA: 225 MM HG
PO2 BLDA: 226 MM HG (ref 85–95)
PO2 BLDA: 227 MM HG (ref 85–95)
PO2 BLDA: 232 MM HG (ref 85–95)
PO2 BLDA: 232 MM HG (ref 85–95)
PO2 BLDA: 234 MM HG (ref 85–95)
PO2 BLDA: 240 MM HG (ref 85–95)
PO2 BLDA: 242 MM HG (ref 85–95)
PO2 BLDA: 242 MM HG (ref 85–95)
PO2 BLDA: 245 MM HG (ref 85–95)
PO2 BLDA: 252 MM HG (ref 85–95)
PO2 BLDA: 260 MM HG (ref 85–95)
PO2 BLDA: 262 MM HG (ref 85–95)
PO2 BLDA: 266 MM HG (ref 85–95)
PO2 BLDA: 270 MM HG (ref 85–95)
PO2 BLDA: 275 MM HG
PO2 BLDA: 276 MM HG (ref 85–95)
PO2 BLDA: 280 MM HG (ref 85–95)
PO2 BLDA: 289 MM HG
PO2 BLDA: 290 MM HG (ref 85–95)
PO2 BLDA: 290 MM HG (ref 85–95)
PO2 BLDA: 293 MM HG (ref 85–95)
PO2 BLDA: 295 MM HG (ref 85–95)
PO2 BLDA: 297 MM HG
PO2 BLDA: 303 MM HG
PO2 BLDA: 315 MM HG (ref 85–95)
PO2 BLDA: 316 MM HG (ref 85–95)
PO2 BLDA: 316 MM HG (ref 85–95)
PO2 BLDA: 324 MM HG
PO2 BLDA: 330 MM HG
PO2 BLDA: 332 MM HG (ref 85–95)
PO2 BLDA: 338 MM HG (ref 85–95)
PO2 BLDA: 343 MM HG
PO2 BLDA: 354 MM HG
PO2 BLDA: 356 MM HG (ref 85–95)
PO2 BLDA: 359 MM HG
PO2 BLDA: 362 MM HG (ref 85–95)
PO2 BLDA: 365 MM HG
PO2 BLDA: 365 MM HG
PO2 BLDA: 367 MM HG
PO2 BLDA: 367 MM HG (ref 85–95)
PO2 BLDA: 368 MM HG (ref 85–95)
PO2 BLDA: 376 MM HG (ref 85–95)
PO2 BLDA: 381 MM HG (ref 85–95)
PO2 BLDA: 383 MM HG (ref 85–95)
PO2 BLDA: 393 MM HG (ref 85–95)
PO2 BLDA: 396 MM HG (ref 85–95)
PO2 BLDA: 397 MM HG
PO2 BLDA: 402 MM HG
PO2 BLDA: 404 MM HG
PO2 BLDA: 404 MM HG (ref 85–95)
PO2 BLDA: 405 MM HG
PO2 BLDA: 405 MM HG
PO2 BLDA: 405 MM HG (ref 85–95)
PO2 BLDA: 408 MM HG (ref 85–95)
PO2 BLDA: 408 MM HG (ref 85–95)
PO2 BLDA: 412 MM HG
PO2 BLDA: 413 MM HG (ref 85–95)
PO2 BLDA: 413 MM HG (ref 85–95)
PO2 BLDA: 414 MM HG (ref 85–95)
PO2 BLDA: 419 MM HG (ref 85–95)
PO2 BLDA: 421 MM HG
PO2 BLDA: 422 MM HG (ref 85–95)
PO2 BLDA: 422 MM HG (ref 85–95)
PO2 BLDA: 423 MM HG
PO2 BLDA: 423 MM HG (ref 85–95)
PO2 BLDA: 426 MM HG
PO2 BLDA: 427 MM HG (ref 85–95)
PO2 BLDA: 434 MM HG
PO2 BLDA: 434 MM HG (ref 85–95)
PO2 BLDA: 442 MM HG (ref 85–95)
PO2 BLDA: 443 MM HG
PO2 BLDA: 446 MM HG (ref 85–95)
PO2 BLDA: 449 MM HG
PO2 BLDA: 449 MM HG
PO2 BLDA: 450 MM HG (ref 85–95)
PO2 BLDA: 452 MM HG
PO2 BLDA: 453 MM HG (ref 85–95)
PO2 BLDA: 454 MM HG (ref 85–95)
PO2 BLDA: 455 MM HG
PO2 BLDA: 461 MM HG (ref 85–95)
PO2 BLDA: 477 MM HG (ref 85–95)
PO2 BLDA: 501 MM HG
PO2 BLDA: 503 MM HG
PO2 BLDA: 525 MM HG (ref 85–95)
PO2 BLDA: 528 MM HG
PO2 BLDA: 56 MM HG (ref 85–95)
PO2 BLDA: 66 MM HG (ref 85–95)
PO2 BLDA: 76 MM HG (ref 85–95)
PO2 BLDA: 78 MM HG (ref 85–95)
PO2 BLDA: 78 MM HG (ref 85–95)
PO2 BLDA: 85 MM HG (ref 85–95)
PO2 BLDA: 94 MM HG (ref 85–95)
PO2 BLDMV: 28 MM HG (ref 35–45)
PO2 BLDMV: 29 MM HG (ref 35–45)
PO2 BLDMV: 30 MM HG (ref 35–45)
PO2 BLDMV: 30 MM HG (ref 35–45)
PO2 BLDMV: 31 MM HG (ref 35–45)
PO2 BLDMV: 31 MM HG (ref 35–45)
PO2 BLDMV: 32 MM HG (ref 35–45)
PO2 BLDMV: 32 MM HG (ref 35–45)
PO2 BLDMV: 33 MM HG (ref 35–45)
PO2 BLDMV: 34 MM HG (ref 35–45)
PO2 BLDMV: 36 MM HG (ref 35–45)
PO2 BLDMV: 37 MM HG (ref 35–45)
PO2 BLDMV: 38 MM HG (ref 35–45)
PO2 BLDMV: 39 MM HG (ref 35–45)
PO2 BLDMV: 40 MM HG (ref 35–45)
PO2 BLDMV: 41 MM HG (ref 35–45)
PO2 BLDMV: 42 MM HG (ref 35–45)
PO2 BLDMV: 43 MM HG (ref 35–45)
PO2 BLDMV: 45 MM HG (ref 35–45)
PO2 BLDMV: 46 MM HG (ref 35–45)
PO2 BLDMV: 48 MM HG (ref 35–45)
PO2 BLDMV: 48 MM HG (ref 35–45)
PO2 BLDMV: 53 MM HG (ref 35–45)
PO2 BLDV: 184 MM HG
PO2 BLDV: 20 MM HG (ref 35–45)
PO2 BLDV: 21 MM HG
PO2 BLDV: 26 MM HG
PO2 BLDV: 26 MM HG
PO2 BLDV: 29 MM HG
PO2 BLDV: 29 MM HG
PO2 BLDV: 29 MM HG (ref 35–45)
PO2 BLDV: 30 MM HG
PO2 BLDV: 30 MM HG
PO2 BLDV: 31 MM HG
PO2 BLDV: 32 MM HG
PO2 BLDV: 34 MM HG
PO2 BLDV: 35 MM HG (ref 35–45)
PO2 BLDV: 36 MM HG
PO2 BLDV: 38 MM HG
PO2 BLDV: 380 MM HG
PO2 BLDV: 380 MM HG
PO2 BLDV: 39 MM HG
PO2 BLDV: 39 MM HG
PO2 BLDV: 40 MM HG (ref 35–45)
PO2 BLDV: 40 MM HG (ref 35–45)
PO2 BLDV: 42 MM HG
PO2 BLDV: 42 MM HG
PO2 BLDV: 43 MM HG
PO2 BLDV: 43 MM HG (ref 35–45)
PO2 BLDV: 44 MM HG
PO2 BLDV: 45 MM HG
PO2 BLDV: 45 MM HG (ref 35–45)
PO2 BLDV: 48 MM HG
PO2 BLDV: 49 MM HG
PO2 BLDV: 49 MM HG
PO2 BLDV: 49 MM HG (ref 35–45)
PO2 BLDV: 50 MM HG
PO2 BLDV: 51 MM HG
PO2 BLDV: 51 MM HG
PO2 BLDV: 52 MM HG
PO2 BLDV: 52 MM HG
PO2 BLDV: 57 MM HG
PO2 BLDV: 57 MM HG
PO2 BLDV: 60 MM HG
PO2 BLDV: 63 MM HG (ref 35–45)
PO2 BLDV: 66 MM HG
PO2 BLDV: 69 MM HG
POLYCHROMASIA BLD QL SMEAR: ABNORMAL
POLYCHROMASIA BLD QL SMEAR: NORMAL
POS CONTROL SPOT COUNT: NORMAL
POTASSIUM BLDA-SCNC: 3.1 MMOL/L (ref 3.5–5.3)
POTASSIUM BLDA-SCNC: 3.1 MMOL/L (ref 3.5–5.3)
POTASSIUM BLDA-SCNC: 3.4 MMOL/L (ref 3.5–5.3)
POTASSIUM BLDA-SCNC: 3.5 MMOL/L (ref 3.5–5.3)
POTASSIUM BLDA-SCNC: 3.6 MMOL/L (ref 3.5–5.3)
POTASSIUM BLDA-SCNC: 3.7 MMOL/L (ref 3.5–5.3)
POTASSIUM BLDA-SCNC: 3.8 MMOL/L (ref 3.5–5.3)
POTASSIUM BLDA-SCNC: 3.9 MMOL/L (ref 3.5–5.3)
POTASSIUM BLDA-SCNC: 4 MMOL/L (ref 3.5–5.3)
POTASSIUM BLDA-SCNC: 4.1 MMOL/L (ref 3.5–5.3)
POTASSIUM BLDA-SCNC: 4.2 MMOL/L (ref 3.5–5.3)
POTASSIUM BLDA-SCNC: 4.3 MMOL/L (ref 3.5–5.3)
POTASSIUM BLDA-SCNC: 4.4 MMOL/L (ref 3.5–5.3)
POTASSIUM BLDA-SCNC: 4.5 MMOL/L (ref 3.5–5.3)
POTASSIUM BLDA-SCNC: 4.6 MMOL/L (ref 3.5–5.3)
POTASSIUM BLDA-SCNC: 4.7 MMOL/L (ref 3.5–5.3)
POTASSIUM BLDA-SCNC: 4.8 MMOL/L (ref 3.5–5.3)
POTASSIUM BLDA-SCNC: 4.9 MMOL/L (ref 3.5–5.3)
POTASSIUM BLDA-SCNC: 5 MMOL/L (ref 3.5–5.3)
POTASSIUM BLDA-SCNC: 5.1 MMOL/L (ref 3.5–5.3)
POTASSIUM BLDA-SCNC: 5.1 MMOL/L (ref 3.5–5.3)
POTASSIUM BLDA-SCNC: 5.3 MMOL/L (ref 3.5–5.3)
POTASSIUM BLDA-SCNC: 5.6 MMOL/L (ref 3.5–5.3)
POTASSIUM BLDA-SCNC: 5.7 MMOL/L (ref 3.5–5.3)
POTASSIUM BLDMV-SCNC: 2.4 MMOL/L (ref 3.5–5.3)
POTASSIUM BLDMV-SCNC: 3.3 MMOL/L (ref 3.5–5.3)
POTASSIUM BLDMV-SCNC: 3.4 MMOL/L (ref 3.5–5.3)
POTASSIUM BLDMV-SCNC: 3.6 MMOL/L (ref 3.5–5.3)
POTASSIUM BLDMV-SCNC: 3.7 MMOL/L (ref 3.5–5.3)
POTASSIUM BLDMV-SCNC: 3.8 MMOL/L (ref 3.5–5.3)
POTASSIUM BLDMV-SCNC: 3.9 MMOL/L (ref 3.5–5.3)
POTASSIUM BLDMV-SCNC: 4 MMOL/L (ref 3.5–5.3)
POTASSIUM BLDMV-SCNC: 4.1 MMOL/L (ref 3.5–5.3)
POTASSIUM BLDMV-SCNC: 4.2 MMOL/L (ref 3.5–5.3)
POTASSIUM BLDMV-SCNC: 4.3 MMOL/L (ref 3.5–5.3)
POTASSIUM BLDMV-SCNC: 4.4 MMOL/L (ref 3.5–5.3)
POTASSIUM BLDMV-SCNC: 4.5 MMOL/L (ref 3.5–5.3)
POTASSIUM BLDMV-SCNC: 4.6 MMOL/L (ref 3.5–5.3)
POTASSIUM BLDMV-SCNC: 4.7 MMOL/L (ref 3.5–5.3)
POTASSIUM BLDMV-SCNC: 4.7 MMOL/L (ref 3.5–5.3)
POTASSIUM BLDV-SCNC: 3.6 MMOL/L (ref 3.5–5.3)
POTASSIUM BLDV-SCNC: 3.7 MMOL/L (ref 3.5–5.3)
POTASSIUM BLDV-SCNC: 3.8 MMOL/L (ref 3.5–5.3)
POTASSIUM BLDV-SCNC: 3.8 MMOL/L (ref 3.5–5.3)
POTASSIUM BLDV-SCNC: 3.9 MMOL/L (ref 3.5–5.3)
POTASSIUM BLDV-SCNC: 3.9 MMOL/L (ref 3.5–5.3)
POTASSIUM BLDV-SCNC: 4 MMOL/L (ref 3.5–5.3)
POTASSIUM BLDV-SCNC: 4 MMOL/L (ref 3.5–5.3)
POTASSIUM BLDV-SCNC: 4.1 MMOL/L (ref 3.5–5.3)
POTASSIUM BLDV-SCNC: 4.2 MMOL/L (ref 3.5–5.3)
POTASSIUM BLDV-SCNC: 4.3 MMOL/L (ref 3.5–5.3)
POTASSIUM BLDV-SCNC: 4.4 MMOL/L (ref 3.5–5.3)
POTASSIUM BLDV-SCNC: 4.4 MMOL/L (ref 3.5–5.3)
POTASSIUM BLDV-SCNC: 4.5 MMOL/L (ref 3.5–5.3)
POTASSIUM BLDV-SCNC: 4.6 MMOL/L (ref 3.5–5.3)
POTASSIUM BLDV-SCNC: 4.6 MMOL/L (ref 3.5–5.3)
POTASSIUM BLDV-SCNC: 4.7 MMOL/L (ref 3.5–5.3)
POTASSIUM BLDV-SCNC: 4.8 MMOL/L (ref 3.5–5.3)
POTASSIUM BLDV-SCNC: 4.8 MMOL/L (ref 3.5–5.3)
POTASSIUM BLDV-SCNC: 5.1 MMOL/L (ref 3.5–5.3)
POTASSIUM BLDV-SCNC: 5.3 MMOL/L (ref 3.5–5.3)
POTASSIUM SERPL-SCNC: 2.6 MMOL/L (ref 3.5–5.3)
POTASSIUM SERPL-SCNC: 2.7 MMOL/L (ref 3.5–5.3)
POTASSIUM SERPL-SCNC: 3.3 MMOL/L (ref 3.5–5.3)
POTASSIUM SERPL-SCNC: 3.3 MMOL/L (ref 3.5–5.3)
POTASSIUM SERPL-SCNC: 3.4 MMOL/L (ref 3.5–5.3)
POTASSIUM SERPL-SCNC: 3.5 MMOL/L (ref 3.5–5.3)
POTASSIUM SERPL-SCNC: 3.6 MMOL/L (ref 3.5–5.3)
POTASSIUM SERPL-SCNC: 3.7 MMOL/L (ref 3.5–5.3)
POTASSIUM SERPL-SCNC: 3.8 MMOL/L (ref 3.5–5.3)
POTASSIUM SERPL-SCNC: 3.9 MMOL/L (ref 3.5–5.3)
POTASSIUM SERPL-SCNC: 4 MMOL/L (ref 3.5–5.3)
POTASSIUM SERPL-SCNC: 4.1 MMOL/L (ref 3.5–5.3)
POTASSIUM SERPL-SCNC: 4.2 MMOL/L (ref 3.5–5.3)
POTASSIUM SERPL-SCNC: 4.3 MMOL/L (ref 3.5–5.3)
POTASSIUM SERPL-SCNC: 4.4 MMOL/L (ref 3.5–5.3)
POTASSIUM SERPL-SCNC: 4.5 MMOL/L (ref 3.5–5.3)
POTASSIUM SERPL-SCNC: 4.6 MMOL/L (ref 3.5–5.3)
POTASSIUM SERPL-SCNC: 4.7 MMOL/L (ref 3.5–5.3)
POTASSIUM SERPL-SCNC: 4.8 MMOL/L (ref 3.5–5.3)
POTASSIUM SERPL-SCNC: 4.9 MMOL/L (ref 3.5–5.3)
POTASSIUM SERPL-SCNC: 5 MMOL/L (ref 3.5–5.3)
POTASSIUM SERPL-SCNC: 5 MMOL/L (ref 3.5–5.3)
POTASSIUM SERPL-SCNC: 5.1 MMOL/L (ref 3.5–5.3)
POTASSIUM SERPL-SCNC: 5.3 MMOL/L (ref 3.5–5.3)
POTASSIUM UR-SCNC: 44 MMOL/L
POTASSIUM/CREAT UR-RTO: 52 MMOL/G CREAT
PR INTERVAL: 104 MS
PR INTERVAL: 120 MS
PR INTERVAL: 126 MS
PR INTERVAL: 128 MS
PR INTERVAL: 128 MS
PR INTERVAL: 132 MS
PR INTERVAL: 136 MS
PR INTERVAL: 152 MS
PREALB SERPL-MCNC: 19.1 MG/DL (ref 18–40)
PROCALCITONIN SERPL-MCNC: 1.29 NG/ML
PRODUCT BLOOD TYPE: 5100
PRODUCT BLOOD TYPE: 600
PRODUCT BLOOD TYPE: 6200
PRODUCT BLOOD TYPE: 7300
PRODUCT BLOOD TYPE: 8400
PRODUCT BLOOD TYPE: 9500
PRODUCT CODE: NORMAL
PROMYELOCYTES # BLD MANUAL: 0.05 X10*3/UL
PROMYELOCYTES NFR BLD MANUAL: 0.9 %
PROT C AG ACT/NOR PPP IA: 81 % (ref 63–153)
PROT S AG ACT/NOR PPP IA: 81 % (ref 63–126)
PROT SERPL-MCNC: 4.7 G/DL (ref 6.4–8.2)
PROT SERPL-MCNC: 4.8 G/DL (ref 6.4–8.2)
PROT SERPL-MCNC: 4.9 G/DL (ref 6.4–8.2)
PROT SERPL-MCNC: 5 G/DL (ref 6.4–8.2)
PROT SERPL-MCNC: 5.1 G/DL (ref 6.4–8.2)
PROT SERPL-MCNC: 5.3 G/DL (ref 6.4–8.2)
PROT SERPL-MCNC: 5.4 G/DL (ref 6.4–8.2)
PROT SERPL-MCNC: 5.5 G/DL (ref 6.4–8.2)
PROT SERPL-MCNC: 5.5 G/DL (ref 6.4–8.2)
PROT SERPL-MCNC: 5.6 G/DL (ref 6.4–8.2)
PROT SERPL-MCNC: 5.7 G/DL (ref 6.4–8.2)
PROT SERPL-MCNC: 5.8 G/DL (ref 6.4–8.2)
PROT SERPL-MCNC: 5.8 G/DL (ref 6.4–8.2)
PROT SERPL-MCNC: 5.9 G/DL (ref 6.4–8.2)
PROT SERPL-MCNC: 6 G/DL (ref 6.4–8.2)
PROT SERPL-MCNC: 6.1 G/DL (ref 6.4–8.2)
PROT SERPL-MCNC: 6.2 G/DL (ref 6.4–8.2)
PROT SERPL-MCNC: 6.3 G/DL (ref 6.4–8.2)
PROT SERPL-MCNC: 6.4 G/DL (ref 6.4–8.2)
PROT SERPL-MCNC: 6.5 G/DL (ref 6.4–8.2)
PROT SERPL-MCNC: 6.6 G/DL (ref 6.4–8.2)
PROT SERPL-MCNC: 6.7 G/DL (ref 6.4–8.2)
PROT SERPL-MCNC: 6.7 G/DL (ref 6.4–8.2)
PROT SERPL-MCNC: 6.8 G/DL (ref 6.4–8.2)
PROT SERPL-MCNC: 6.9 G/DL (ref 6.4–8.2)
PROT SERPL-MCNC: 7 G/DL (ref 6.4–8.2)
PROT SERPL-MCNC: 7 G/DL (ref 6.4–8.2)
PROT SERPL-MCNC: 7.2 G/DL (ref 6.4–8.2)
PROT SERPL-MCNC: 7.3 G/DL (ref 6.4–8.2)
PROT SERPL-MCNC: 7.3 G/DL (ref 6.4–8.2)
PROT SERPL-MCNC: 7.4 G/DL (ref 6.4–8.2)
PROT SERPL-MCNC: 7.4 G/DL (ref 6.4–8.2)
PROT SERPL-MCNC: 7.6 G/DL (ref 6.4–8.2)
PROT SERPL-MCNC: 7.7 G/DL (ref 6.4–8.2)
PROT SERPL-MCNC: 7.7 G/DL (ref 6.4–8.2)
PROT SERPL-MCNC: 7.8 G/DL (ref 6.4–8.2)
PROT SERPL-MCNC: 7.8 G/DL (ref 6.4–8.2)
PROT UR STRIP.AUTO-MCNC: ABNORMAL MG/DL
PROT UR-ACNC: 13 MG/DL (ref 5–24)
PROT UR-ACNC: 40 MG/DL (ref 5–24)
PROT/CREAT UR: 0.07 MG/MG CREAT (ref 0–0.17)
PROT/CREAT UR: 0.47 MG/MG CREAT (ref 0–0.17)
PROTHROM ACT/NOR PPP: 49 % (ref 80–130)
PROTHROMBIN TIME: 10.7 SECONDS (ref 9.8–12.8)
PROTHROMBIN TIME: 11.6 SECONDS (ref 9.8–12.8)
PROTHROMBIN TIME: 12.2 SECONDS (ref 9.8–12.8)
PROTHROMBIN TIME: 12.3 SECONDS (ref 9.8–12.8)
PROTHROMBIN TIME: 12.5 SECONDS (ref 9.8–12.8)
PROTHROMBIN TIME: 12.7 SECONDS (ref 9.8–12.8)
PROTHROMBIN TIME: 12.8 SECONDS (ref 9.8–12.8)
PROTHROMBIN TIME: 12.9 SECONDS (ref 9.8–12.8)
PROTHROMBIN TIME: 13.1 SECONDS (ref 9.8–12.8)
PROTHROMBIN TIME: 13.2 SECONDS (ref 9.8–12.8)
PROTHROMBIN TIME: 13.3 SECONDS (ref 9.8–12.8)
PROTHROMBIN TIME: 13.5 SECONDS (ref 9.8–12.8)
PROTHROMBIN TIME: 13.6 SECONDS (ref 9.8–12.8)
PROTHROMBIN TIME: 13.7 SECONDS (ref 9.8–12.8)
PROTHROMBIN TIME: 13.7 SECONDS (ref 9.8–12.8)
PROTHROMBIN TIME: 13.8 SECONDS (ref 9.8–12.8)
PROTHROMBIN TIME: 13.9 SECONDS (ref 9.8–12.8)
PROTHROMBIN TIME: 14.1 SECONDS (ref 9.8–12.8)
PROTHROMBIN TIME: 14.2 SECONDS (ref 9.8–12.8)
PROTHROMBIN TIME: 14.3 SECONDS (ref 9.8–12.8)
PROTHROMBIN TIME: 14.4 SECONDS (ref 9.8–12.8)
PROTHROMBIN TIME: 14.6 SECONDS (ref 9.8–12.8)
PROTHROMBIN TIME: 14.6 SECONDS (ref 9.8–12.8)
PROTHROMBIN TIME: 14.7 SECONDS (ref 9.8–12.8)
PROTHROMBIN TIME: 14.8 SECONDS (ref 9.8–12.8)
PROTHROMBIN TIME: 14.8 SECONDS (ref 9.8–12.8)
PROTHROMBIN TIME: 14.9 SECONDS (ref 9.8–12.8)
PROTHROMBIN TIME: 15 SECONDS (ref 9.8–12.8)
PROTHROMBIN TIME: 15.1 SECONDS (ref 9.8–12.8)
PROTHROMBIN TIME: 15.4 SECONDS (ref 9.8–12.8)
PROTHROMBIN TIME: 15.5 SECONDS (ref 9.8–12.8)
PROTHROMBIN TIME: 15.5 SECONDS (ref 9.8–12.8)
PROTHROMBIN TIME: 15.6 SECONDS (ref 9.8–12.8)
PROTHROMBIN TIME: 17.3 SECONDS (ref 9.8–12.8)
PROTHROMBIN TIME: 18.3 SECONDS (ref 9.8–12.8)
PROTHROMBIN TIME: 18.4 SECONDS (ref 9.8–12.8)
PROTHROMBIN TIME: 19.2 SECONDS (ref 9.8–12.8)
PROTHROMBIN TIME: 20 SECONDS (ref 9.8–12.8)
PROTHROMBIN TIME: 20.6 SECONDS (ref 9.8–12.8)
PROTHROMBIN TIME: 20.7 SECONDS (ref 9.8–12.8)
PROTHROMBIN TIME: 21.4 SECONDS (ref 9.8–12.8)
PROTHROMBIN TIME: 22.5 SECONDS (ref 9.8–12.8)
PROTHROMBIN TIME: 23.5 SECONDS (ref 9.8–12.8)
PROTHROMBIN TIME: 24.3 SECONDS (ref 9.8–12.8)
PROTHROMBIN TIME: 24.5 SECONDS (ref 9.8–12.8)
PROTHROMBIN TIME: 25 SECONDS (ref 9.8–12.8)
PROTHROMBIN TIME: 26.3 SECONDS (ref 9.8–12.8)
PROTHROMBIN TIME: 27.1 SECONDS (ref 9.8–12.8)
PROTHROMBIN TIME: 27.2 SECONDS (ref 9.8–12.8)
PROTHROMBIN TIME: 27.3 SECONDS (ref 9.8–12.8)
PROTHROMBIN TIME: 28.1 SECONDS (ref 9.8–12.8)
PROTHROMBIN TIME: 28.4 SECONDS (ref 9.8–12.8)
PROTHROMBIN TIME: 29 SECONDS (ref 9.8–12.8)
PROTHROMBIN TIME: 30.5 SECONDS (ref 9.8–12.8)
PROTHROMBIN TIME: 31.2 SECONDS (ref 9.8–12.8)
PROTHROMBIN TIME: 31.7 SECONDS (ref 9.8–12.8)
PROTHROMBIN TIME: 32.7 SECONDS (ref 9.8–12.8)
PROTHROMBIN TIME: 33.3 SECONDS (ref 9.8–12.8)
PROTHROMBIN TIME: 37 SECONDS (ref 9.8–12.8)
PROTHROMBIN TIME: 37.8 SECONDS (ref 9.8–12.8)
PSA SERPL-MCNC: 0.37 NG/ML
Q ONSET: 220 MS
Q ONSET: 223 MS
Q ONSET: 224 MS
Q ONSET: 225 MS
Q ONSET: 225 MS
Q ONSET: 227 MS
QRS COUNT: 15 BEATS
QRS COUNT: 17 BEATS
QRS COUNT: 19 BEATS
QRS COUNT: 20 BEATS
QRS COUNT: 21 BEATS
QRS COUNT: 21 BEATS
QRS COUNT: 22 BEATS
QRS COUNT: 25 BEATS
QRS DURATION: 76 MS
QRS DURATION: 80 MS
QRS DURATION: 86 MS
QRS DURATION: 86 MS
QRS DURATION: 88 MS
QRS DURATION: 96 MS
QT INTERVAL: 280 MS
QT INTERVAL: 298 MS
QT INTERVAL: 304 MS
QT INTERVAL: 314 MS
QT INTERVAL: 324 MS
QT INTERVAL: 328 MS
QT INTERVAL: 376 MS
QT INTERVAL: 442 MS
QTC CALCULATION(BAZETT): 399 MS
QTC CALCULATION(BAZETT): 402 MS
QTC CALCULATION(BAZETT): 409 MS
QTC CALCULATION(BAZETT): 448 MS
QTC CALCULATION(BAZETT): 458 MS
QTC CALCULATION(BAZETT): 516 MS
QTC CALCULATION(BAZETT): 564 MS
QTC CALCULATION(BAZETT): 614 MS
QTC FREDERICIA: 356 MS
QTC FREDERICIA: 362 MS
QTC FREDERICIA: 379 MS
QTC FREDERICIA: 394 MS
QTC FREDERICIA: 404 MS
QTC FREDERICIA: 444 MS
QTC FREDERICIA: 492 MS
QTC FREDERICIA: 551 MS
R AXIS: -12 DEGREES
R AXIS: -28 DEGREES
R AXIS: -43 DEGREES
R AXIS: -5 DEGREES
R AXIS: -9 DEGREES
R AXIS: 116 DEGREES
R AXIS: 238 DEGREES
R AXIS: 59 DEGREES
RBC # BLD AUTO: 2.07 X10*6/UL (ref 4–5.2)
RBC # BLD AUTO: 2.1 X10*6/UL (ref 4–5.2)
RBC # BLD AUTO: 2.11 X10*6/UL (ref 4–5.2)
RBC # BLD AUTO: 2.11 X10*6/UL (ref 4–5.2)
RBC # BLD AUTO: 2.16 X10*6/UL (ref 4–5.2)
RBC # BLD AUTO: 2.17 X10*6/UL (ref 4–5.2)
RBC # BLD AUTO: 2.18 X10*6/UL (ref 4–5.2)
RBC # BLD AUTO: 2.19 X10*6/UL (ref 4–5.2)
RBC # BLD AUTO: 2.21 X10*6/UL (ref 4–5.2)
RBC # BLD AUTO: 2.21 X10*6/UL (ref 4–5.2)
RBC # BLD AUTO: 2.23 X10*6/UL (ref 4–5.2)
RBC # BLD AUTO: 2.23 X10*6/UL (ref 4–5.2)
RBC # BLD AUTO: 2.24 X10*6/UL (ref 4–5.2)
RBC # BLD AUTO: 2.25 X10*6/UL (ref 4–5.2)
RBC # BLD AUTO: 2.28 X10*6/UL (ref 4–5.2)
RBC # BLD AUTO: 2.31 X10*6/UL (ref 4–5.2)
RBC # BLD AUTO: 2.34 X10*6/UL (ref 4–5.2)
RBC # BLD AUTO: 2.35 X10*6/UL (ref 4–5.2)
RBC # BLD AUTO: 2.37 X10*6/UL (ref 4–5.2)
RBC # BLD AUTO: 2.37 X10*6/UL (ref 4–5.2)
RBC # BLD AUTO: 2.38 X10*6/UL (ref 4–5.2)
RBC # BLD AUTO: 2.38 X10*6/UL (ref 4–5.2)
RBC # BLD AUTO: 2.39 X10*6/UL (ref 4–5.2)
RBC # BLD AUTO: 2.4 X10*6/UL (ref 4–5.2)
RBC # BLD AUTO: 2.41 X10*6/UL (ref 4–5.2)
RBC # BLD AUTO: 2.41 X10*6/UL (ref 4–5.2)
RBC # BLD AUTO: 2.42 X10*6/UL (ref 4–5.2)
RBC # BLD AUTO: 2.43 X10*6/UL (ref 4–5.2)
RBC # BLD AUTO: 2.44 X10*6/UL (ref 4–5.2)
RBC # BLD AUTO: 2.44 X10*6/UL (ref 4–5.2)
RBC # BLD AUTO: 2.45 X10*6/UL (ref 4–5.2)
RBC # BLD AUTO: 2.46 X10*6/UL (ref 4–5.2)
RBC # BLD AUTO: 2.47 X10*6/UL (ref 4–5.2)
RBC # BLD AUTO: 2.48 X10*6/UL (ref 4–5.2)
RBC # BLD AUTO: 2.49 X10*6/UL (ref 4–5.2)
RBC # BLD AUTO: 2.5 X10*6/UL (ref 4–5.2)
RBC # BLD AUTO: 2.5 X10*6/UL (ref 4–5.2)
RBC # BLD AUTO: 2.51 X10*6/UL (ref 4–5.2)
RBC # BLD AUTO: 2.53 X10*6/UL (ref 4–5.2)
RBC # BLD AUTO: 2.54 X10*6/UL (ref 4–5.2)
RBC # BLD AUTO: 2.54 X10*6/UL (ref 4–5.2)
RBC # BLD AUTO: 2.55 X10*6/UL (ref 4–5.2)
RBC # BLD AUTO: 2.59 X10*6/UL (ref 4–5.2)
RBC # BLD AUTO: 2.6 X10*6/UL (ref 4–5.2)
RBC # BLD AUTO: 2.6 X10*6/UL (ref 4–5.2)
RBC # BLD AUTO: 2.62 X10*6/UL (ref 4–5.2)
RBC # BLD AUTO: 2.63 X10*6/UL (ref 4–5.2)
RBC # BLD AUTO: 2.63 X10*6/UL (ref 4–5.2)
RBC # BLD AUTO: 2.64 X10*6/UL (ref 4–5.2)
RBC # BLD AUTO: 2.64 X10*6/UL (ref 4–5.2)
RBC # BLD AUTO: 2.65 X10*6/UL (ref 4–5.2)
RBC # BLD AUTO: 2.66 X10*6/UL (ref 4–5.2)
RBC # BLD AUTO: 2.66 X10*6/UL (ref 4–5.2)
RBC # BLD AUTO: 2.67 X10*6/UL (ref 4–5.2)
RBC # BLD AUTO: 2.68 X10*6/UL (ref 4–5.2)
RBC # BLD AUTO: 2.69 X10*6/UL (ref 4–5.2)
RBC # BLD AUTO: 2.69 X10*6/UL (ref 4–5.2)
RBC # BLD AUTO: 2.72 X10*6/UL (ref 4–5.2)
RBC # BLD AUTO: 2.74 X10*6/UL (ref 4–5.2)
RBC # BLD AUTO: 2.76 X10*6/UL (ref 4–5.2)
RBC # BLD AUTO: 2.76 X10*6/UL (ref 4–5.2)
RBC # BLD AUTO: 2.77 X10*6/UL (ref 4–5.2)
RBC # BLD AUTO: 2.78 X10*6/UL (ref 4–5.2)
RBC # BLD AUTO: 2.79 X10*6/UL (ref 4–5.2)
RBC # BLD AUTO: 2.79 X10*6/UL (ref 4–5.2)
RBC # BLD AUTO: 2.81 X10*6/UL (ref 4–5.2)
RBC # BLD AUTO: 2.81 X10*6/UL (ref 4–5.2)
RBC # BLD AUTO: 2.83 X10*6/UL (ref 4–5.2)
RBC # BLD AUTO: 2.83 X10*6/UL (ref 4–5.2)
RBC # BLD AUTO: 2.84 X10*6/UL (ref 4–5.2)
RBC # BLD AUTO: 2.85 X10*6/UL (ref 4–5.2)
RBC # BLD AUTO: 2.87 X10*6/UL (ref 4–5.2)
RBC # BLD AUTO: 2.88 X10*6/UL (ref 4–5.2)
RBC # BLD AUTO: 2.9 X10*6/UL (ref 4–5.2)
RBC # BLD AUTO: 2.91 X10*6/UL (ref 4–5.2)
RBC # BLD AUTO: 2.92 X10*6/UL (ref 4–5.2)
RBC # BLD AUTO: 2.94 X10*6/UL (ref 4–5.2)
RBC # BLD AUTO: 2.97 X10*6/UL (ref 4–5.2)
RBC # BLD AUTO: 2.97 X10*6/UL (ref 4–5.2)
RBC # BLD AUTO: 3 X10*6/UL (ref 4–5.2)
RBC # BLD AUTO: 3.01 X10*6/UL (ref 4–5.2)
RBC # BLD AUTO: 3.06 X10*6/UL (ref 4–5.2)
RBC # BLD AUTO: 3.08 X10*6/UL (ref 4–5.2)
RBC # BLD AUTO: 3.09 X10*6/UL (ref 4–5.2)
RBC # BLD AUTO: 3.12 X10*6/UL (ref 4–5.2)
RBC # BLD AUTO: 3.18 X10*6/UL (ref 4–5.2)
RBC # BLD AUTO: 3.23 X10*6/UL (ref 4–5.2)
RBC # BLD AUTO: 3.24 X10*6/UL (ref 4–5.2)
RBC # BLD AUTO: 3.36 X10*6/UL (ref 4–5.2)
RBC # BLD AUTO: 3.42 X10*6/UL (ref 4–5.2)
RBC # BLD AUTO: 4.1 X10*6/UL (ref 4–5.2)
RBC # BLD AUTO: 4.11 X10*6/UL (ref 4–5.2)
RBC # BLD AUTO: 4.61 X10*6/UL (ref 4–5.2)
RBC # BLD AUTO: 4.63 X10*6/UL (ref 4–5.2)
RBC # BLD AUTO: 4.65 X10*6/UL (ref 4–5.2)
RBC # BLD AUTO: 4.77 X10*6/UL (ref 4–5.2)
RBC # BLD AUTO: 4.77 X10*6/UL (ref 4–5.2)
RBC # UR STRIP.AUTO: ABNORMAL /UL
RBC # UR STRIP.AUTO: NEGATIVE /UL
RBC #/AREA URNS AUTO: >20 /HPF
RBC #/AREA URNS AUTO: ABNORMAL /HPF
RBC #/AREA URNS AUTO: ABNORMAL /HPF
RBC #/AREA URNS AUTO: NORMAL /HPF
RBC MORPH BLD: ABNORMAL
RBC MORPH BLD: NORMAL
RENAL EPI CELLS #/AREA UR COMP ASSIST: ABNORMAL /HPF
RETICS #: 0.04 X10*6/UL (ref 0.02–0.08)
RETICS #: 0.05 X10*6/UL (ref 0.02–0.08)
RETICS #: 0.08 X10*6/UL (ref 0.02–0.08)
RETICS #: 0.08 X10*6/UL (ref 0.02–0.08)
RETICS #: 0.09 X10*6/UL (ref 0.02–0.08)
RETICS #: 0.09 X10*6/UL (ref 0.02–0.08)
RETICS #: 0.1 X10*6/UL (ref 0.02–0.08)
RETICS #: 0.11 X10*6/UL (ref 0.02–0.08)
RETICS #: 0.12 X10*6/UL (ref 0.02–0.08)
RETICS #: 0.13 X10*6/UL (ref 0.02–0.08)
RETICS #: 0.14 X10*6/UL (ref 0.02–0.08)
RETICS #: 0.14 X10*6/UL (ref 0.02–0.08)
RETICS #: 0.16 X10*6/UL (ref 0.02–0.08)
RETICS #: 0.18 X10*6/UL (ref 0.02–0.08)
RETICS #: 0.2 X10*6/UL (ref 0.02–0.08)
RETICS #: 0.23 X10*6/UL (ref 0.02–0.08)
RETICS #: 0.24 X10*6/UL (ref 0.02–0.08)
RETICS #: 0.25 X10*6/UL (ref 0.02–0.08)
RETICS #: 0.25 X10*6/UL (ref 0.02–0.08)
RETICS #: 0.26 X10*6/UL (ref 0.02–0.08)
RETICS #: 0.26 X10*6/UL (ref 0.02–0.08)
RETICS #: 0.27 X10*6/UL (ref 0.02–0.08)
RETICS #: 0.3 X10*6/UL (ref 0.02–0.08)
RETICS #: 0.32 X10*6/UL (ref 0.02–0.08)
RETICS/RBC NFR AUTO: 1.6 % (ref 0.5–2)
RETICS/RBC NFR AUTO: 10.2 % (ref 0.5–2)
RETICS/RBC NFR AUTO: 10.4 % (ref 0.5–2)
RETICS/RBC NFR AUTO: 10.6 % (ref 0.5–2)
RETICS/RBC NFR AUTO: 11.1 % (ref 0.5–2)
RETICS/RBC NFR AUTO: 11.3 % (ref 0.5–2)
RETICS/RBC NFR AUTO: 2.2 % (ref 0.5–2)
RETICS/RBC NFR AUTO: 3.5 % (ref 0.5–2)
RETICS/RBC NFR AUTO: 3.7 % (ref 0.5–2)
RETICS/RBC NFR AUTO: 3.7 % (ref 0.5–2)
RETICS/RBC NFR AUTO: 3.8 % (ref 0.5–2)
RETICS/RBC NFR AUTO: 3.8 % (ref 0.5–2)
RETICS/RBC NFR AUTO: 3.9 % (ref 0.5–2)
RETICS/RBC NFR AUTO: 3.9 % (ref 0.5–2)
RETICS/RBC NFR AUTO: 4 % (ref 0.5–2)
RETICS/RBC NFR AUTO: 4.1 % (ref 0.5–2)
RETICS/RBC NFR AUTO: 4.2 % (ref 0.5–2)
RETICS/RBC NFR AUTO: 4.2 % (ref 0.5–2)
RETICS/RBC NFR AUTO: 4.4 % (ref 0.5–2)
RETICS/RBC NFR AUTO: 4.8 % (ref 0.5–2)
RETICS/RBC NFR AUTO: 4.8 % (ref 0.5–2)
RETICS/RBC NFR AUTO: 5.2 % (ref 0.5–2)
RETICS/RBC NFR AUTO: 5.3 % (ref 0.5–2)
RETICS/RBC NFR AUTO: 5.4 % (ref 0.5–2)
RETICS/RBC NFR AUTO: 5.6 % (ref 0.5–2)
RETICS/RBC NFR AUTO: 5.8 % (ref 0.5–2)
RETICS/RBC NFR AUTO: 5.9 % (ref 0.5–2)
RETICS/RBC NFR AUTO: 5.9 % (ref 0.5–2)
RETICS/RBC NFR AUTO: 7.2 % (ref 0.5–2)
RETICS/RBC NFR AUTO: 8.2 % (ref 0.5–2)
RETICS/RBC NFR AUTO: 8.4 % (ref 0.5–2)
RETICS/RBC NFR AUTO: 8.7 % (ref 0.5–2)
RETICS/RBC NFR AUTO: 8.8 % (ref 0.5–2)
RETICS/RBC NFR AUTO: 9.4 % (ref 0.5–2)
RH FACTOR (ANTIGEN D): NORMAL
RIGHT VENTRICLE FREE WALL PEAK S': 3.92 CM/S
RIGHT VENTRICLE FREE WALL PEAK S': 7.62 CM/S
RIGHT VENTRICLE FREE WALL PEAK S': 8.59 CM/S
RIGHT VENTRICLE FREE WALL PEAK S': 9.14 CM/S
RIGHT VENTRICLE FREE WALL PEAK S': 9.36 CM/S
RIGHT VENTRICLE PEAK SYSTOLIC PRESSURE: 25.3 MMHG
RIGHT VENTRICLE PEAK SYSTOLIC PRESSURE: 28.8 MMHG
RIGHT VENTRICLE PEAK SYSTOLIC PRESSURE: 30.2 MMHG
RIGHT VENTRICLE PEAK SYSTOLIC PRESSURE: 33.9 MMHG
RIGHT VENTRICLE PEAK SYSTOLIC PRESSURE: 34.2 MMHG
RIGHT VENTRICLE PEAK SYSTOLIC PRESSURE: 35.3 MMHG
RIGHT VENTRICLE PEAK SYSTOLIC PRESSURE: 35.9 MMHG
RIGHT VENTRICLE PEAK SYSTOLIC PRESSURE: 47.7 MMHG
RSV PCR, RVP, VIRC: NOT DETECTED
RSV PCR, RVP, VIRC: NOT DETECTED
RSV RNA RESP QL NAA+PROBE: NOT DETECTED
RUBEOLA IGG ANTIBODY INDEX: 5.6 IA
RUBV IGG SERPL IA-ACNC: 5.1 IA
RUBV IGG SERPL QL IA: POSITIVE
SAO2 % BLDA: 100 %
SAO2 % BLDA: 100 % (ref 94–100)
SAO2 % BLDA: 95 % (ref 94–100)
SAO2 % BLDA: 97 % (ref 94–100)
SAO2 % BLDA: 97 % (ref 94–100)
SAO2 % BLDA: 98 %
SAO2 % BLDA: 98 % (ref 94–100)
SAO2 % BLDA: 99 %
SAO2 % BLDA: 99 % (ref 94–100)
SAO2 % BLDA: NORMAL %
SAO2 % BLDA: NORMAL %
SAO2 % BLDMV: 32 % (ref 45–75)
SAO2 % BLDMV: 37 % (ref 45–75)
SAO2 % BLDMV: 38 % (ref 45–75)
SAO2 % BLDMV: 42 % (ref 45–75)
SAO2 % BLDMV: 43 % (ref 45–75)
SAO2 % BLDMV: 47 % (ref 45–75)
SAO2 % BLDMV: 48 % (ref 45–75)
SAO2 % BLDMV: 51 % (ref 45–75)
SAO2 % BLDMV: 54 % (ref 45–75)
SAO2 % BLDMV: 55 % (ref 45–75)
SAO2 % BLDMV: 55 % (ref 45–75)
SAO2 % BLDMV: 56 % (ref 45–75)
SAO2 % BLDMV: 57 % (ref 45–75)
SAO2 % BLDMV: 58 % (ref 45–75)
SAO2 % BLDMV: 59 % (ref 45–75)
SAO2 % BLDMV: 60 % (ref 45–75)
SAO2 % BLDMV: 60 % (ref 45–75)
SAO2 % BLDMV: 61 % (ref 45–75)
SAO2 % BLDMV: 61 % (ref 45–75)
SAO2 % BLDMV: 62 % (ref 45–75)
SAO2 % BLDMV: 63 % (ref 45–75)
SAO2 % BLDMV: 64 % (ref 45–75)
SAO2 % BLDMV: 65 % (ref 45–75)
SAO2 % BLDMV: 66 % (ref 45–75)
SAO2 % BLDMV: 66 % (ref 45–75)
SAO2 % BLDMV: 70 % (ref 45–75)
SAO2 % BLDMV: 70 % (ref 45–75)
SAO2 % BLDMV: 73 % (ref 45–75)
SAO2 % BLDMV: 75 % (ref 45–75)
SAO2 % BLDMV: 81 % (ref 45–75)
SAO2 % BLDMV: 81 % (ref 45–75)
SAO2 % BLDV: 100 %
SAO2 % BLDV: 12 % (ref 45–75)
SAO2 % BLDV: 30 % (ref 45–75)
SAO2 % BLDV: 41 %
SAO2 % BLDV: 41 %
SAO2 % BLDV: 42 %
SAO2 % BLDV: 48 %
SAO2 % BLDV: 50 %
SAO2 % BLDV: 51 % (ref 45–75)
SAO2 % BLDV: 54 %
SAO2 % BLDV: 54 % (ref 45–75)
SAO2 % BLDV: 56 %
SAO2 % BLDV: 57 %
SAO2 % BLDV: 62 %
SAO2 % BLDV: 64 %
SAO2 % BLDV: 65 % (ref 45–75)
SAO2 % BLDV: 70 %
SAO2 % BLDV: 70 %
SAO2 % BLDV: 71 % (ref 45–75)
SAO2 % BLDV: 73 %
SAO2 % BLDV: 73 % (ref 45–75)
SAO2 % BLDV: 74 %
SAO2 % BLDV: 75 %
SAO2 % BLDV: 76 % (ref 45–75)
SAO2 % BLDV: 78 %
SAO2 % BLDV: 79 %
SAO2 % BLDV: 79 %
SAO2 % BLDV: 82 %
SAO2 % BLDV: 83 %
SAO2 % BLDV: 84 %
SAO2 % BLDV: 85 %
SAO2 % BLDV: 86 %
SAO2 % BLDV: 88 %
SAO2 % BLDV: 89 %
SAO2 % BLDV: 89 %
SAO2 % BLDV: 90 %
SAO2 % BLDV: 91 % (ref 45–75)
SAO2 % BLDV: 93 %
SAO2 % BLDV: 95 %
SAO2 % BLDV: 96 %
SARS-COV-2 RNA RESP QL NAA+PROBE: NOT DETECTED
SCAN RESULT: ABNORMAL
SCAN RESULT: NORMAL
SCHISTOCYTES BLD QL SMEAR: ABNORMAL
SCHISTOCYTES BLD QL SMEAR: NORMAL
SERUM AND PLATELET FACTOR 4: 0.08 OD UNITS
SERUM AND PLATELET FACTOR 4: 0.15 OD UNITS
SERUM AND PLATELET FACTOR 4: 0.16 OD UNITS
SIROLIMUS BLD-MCNC: 10.8 NG/ML (ref 4–20)
SIROLIMUS BLD-MCNC: 13 NG/ML (ref 4–20)
SIROLIMUS BLD-MCNC: 13.9 NG/ML (ref 4–20)
SIROLIMUS BLD-MCNC: 2.4 NG/ML (ref 4–20)
SIROLIMUS BLD-MCNC: 3 NG/ML (ref 4–20)
SIROLIMUS BLD-MCNC: 3 NG/ML (ref 4–20)
SIROLIMUS BLD-MCNC: 3.1 NG/ML (ref 4–20)
SIROLIMUS BLD-MCNC: 3.3 NG/ML (ref 4–20)
SIROLIMUS BLD-MCNC: 3.4 NG/ML (ref 4–20)
SIROLIMUS BLD-MCNC: 3.5 NG/ML (ref 4–20)
SIROLIMUS BLD-MCNC: 3.6 NG/ML (ref 4–20)
SIROLIMUS BLD-MCNC: 3.7 NG/ML (ref 4–20)
SIROLIMUS BLD-MCNC: 4 NG/ML (ref 4–20)
SIROLIMUS BLD-MCNC: 4 NG/ML (ref 4–20)
SIROLIMUS BLD-MCNC: 4.2 NG/ML (ref 4–20)
SIROLIMUS BLD-MCNC: 4.3 NG/ML (ref 4–20)
SIROLIMUS BLD-MCNC: 4.3 NG/ML (ref 4–20)
SIROLIMUS BLD-MCNC: 4.6 NG/ML (ref 4–20)
SIROLIMUS BLD-MCNC: 4.6 NG/ML (ref 4–20)
SIROLIMUS BLD-MCNC: 4.7 NG/ML (ref 4–20)
SIROLIMUS BLD-MCNC: 4.8 NG/ML (ref 4–20)
SIROLIMUS BLD-MCNC: 5.3 NG/ML (ref 4–20)
SIROLIMUS BLD-MCNC: 5.6 NG/ML (ref 4–20)
SIROLIMUS BLD-MCNC: 5.7 NG/ML (ref 4–20)
SIROLIMUS BLD-MCNC: 5.9 NG/ML (ref 4–20)
SIROLIMUS BLD-MCNC: 6.4 NG/ML (ref 4–20)
SIROLIMUS BLD-MCNC: 6.6 NG/ML (ref 4–20)
SIROLIMUS BLD-MCNC: 6.6 NG/ML (ref 4–20)
SIROLIMUS BLD-MCNC: 6.7 NG/ML (ref 4–20)
SIROLIMUS BLD-MCNC: 6.7 NG/ML (ref 4–20)
SIROLIMUS BLD-MCNC: 7.9 NG/ML (ref 4–20)
SIROLIMUS BLD-MCNC: 8.5 NG/ML (ref 4–20)
SIROLIMUS BLD-MCNC: 8.5 NG/ML (ref 4–20)
SIROLIMUS BLD-MCNC: 8.6 NG/ML (ref 4–20)
SIROLIMUS BLD-MCNC: 8.8 NG/ML (ref 4–20)
SIROLIMUS BLD-MCNC: 9 NG/ML (ref 4–20)
SIROLIMUS BLD-MCNC: 9 NG/ML (ref 4–20)
SIROLIMUS BLD-MCNC: 9.7 NG/ML (ref 4–20)
SIROLIMUS BLD-MCNC: 9.8 NG/ML (ref 4–20)
SODIUM BLDA-SCNC: 119 MMOL/L (ref 136–145)
SODIUM BLDA-SCNC: 119 MMOL/L (ref 136–145)
SODIUM BLDA-SCNC: 122 MMOL/L (ref 136–145)
SODIUM BLDA-SCNC: 123 MMOL/L (ref 136–145)
SODIUM BLDA-SCNC: 124 MMOL/L (ref 136–145)
SODIUM BLDA-SCNC: 125 MMOL/L (ref 136–145)
SODIUM BLDA-SCNC: 126 MMOL/L (ref 136–145)
SODIUM BLDA-SCNC: 127 MMOL/L (ref 136–145)
SODIUM BLDA-SCNC: 128 MMOL/L (ref 136–145)
SODIUM BLDA-SCNC: 129 MMOL/L (ref 136–145)
SODIUM BLDA-SCNC: 130 MMOL/L (ref 136–145)
SODIUM BLDA-SCNC: 131 MMOL/L (ref 136–145)
SODIUM BLDA-SCNC: 132 MMOL/L (ref 136–145)
SODIUM BLDA-SCNC: 133 MMOL/L (ref 136–145)
SODIUM BLDA-SCNC: 134 MMOL/L (ref 136–145)
SODIUM BLDA-SCNC: 135 MMOL/L (ref 136–145)
SODIUM BLDA-SCNC: 136 MMOL/L (ref 136–145)
SODIUM BLDA-SCNC: 137 MMOL/L (ref 136–145)
SODIUM BLDA-SCNC: 138 MMOL/L (ref 136–145)
SODIUM BLDA-SCNC: ABNORMAL MMOL/L
SODIUM BLDMV-SCNC: 122 MMOL/L (ref 136–145)
SODIUM BLDMV-SCNC: 124 MMOL/L (ref 136–145)
SODIUM BLDMV-SCNC: 124 MMOL/L (ref 136–145)
SODIUM BLDMV-SCNC: 125 MMOL/L (ref 136–145)
SODIUM BLDMV-SCNC: 126 MMOL/L (ref 136–145)
SODIUM BLDMV-SCNC: 127 MMOL/L (ref 136–145)
SODIUM BLDMV-SCNC: 127 MMOL/L (ref 136–145)
SODIUM BLDMV-SCNC: 128 MMOL/L (ref 136–145)
SODIUM BLDMV-SCNC: 129 MMOL/L (ref 136–145)
SODIUM BLDMV-SCNC: 130 MMOL/L (ref 136–145)
SODIUM BLDMV-SCNC: 131 MMOL/L (ref 136–145)
SODIUM BLDMV-SCNC: 133 MMOL/L (ref 136–145)
SODIUM BLDMV-SCNC: 134 MMOL/L (ref 136–145)
SODIUM BLDMV-SCNC: 134 MMOL/L (ref 136–145)
SODIUM BLDMV-SCNC: 135 MMOL/L (ref 136–145)
SODIUM BLDMV-SCNC: 136 MMOL/L (ref 136–145)
SODIUM BLDMV-SCNC: 137 MMOL/L (ref 136–145)
SODIUM BLDV-SCNC: 123 MMOL/L (ref 136–145)
SODIUM BLDV-SCNC: 125 MMOL/L (ref 136–145)
SODIUM BLDV-SCNC: 125 MMOL/L (ref 136–145)
SODIUM BLDV-SCNC: 129 MMOL/L (ref 136–145)
SODIUM BLDV-SCNC: 129 MMOL/L (ref 136–145)
SODIUM BLDV-SCNC: 130 MMOL/L (ref 136–145)
SODIUM BLDV-SCNC: 131 MMOL/L (ref 136–145)
SODIUM BLDV-SCNC: 132 MMOL/L (ref 136–145)
SODIUM BLDV-SCNC: 132 MMOL/L (ref 136–145)
SODIUM BLDV-SCNC: 133 MMOL/L (ref 136–145)
SODIUM BLDV-SCNC: 134 MMOL/L (ref 136–145)
SODIUM BLDV-SCNC: 135 MMOL/L (ref 136–145)
SODIUM BLDV-SCNC: 137 MMOL/L (ref 136–145)
SODIUM BLDV-SCNC: 137 MMOL/L (ref 136–145)
SODIUM BLDV-SCNC: 138 MMOL/L (ref 136–145)
SODIUM BLDV-SCNC: 139 MMOL/L (ref 136–145)
SODIUM BLDV-SCNC: ABNORMAL MMOL/L
SODIUM SERPL-SCNC: 112 MMOL/L (ref 136–145)
SODIUM SERPL-SCNC: 120 MMOL/L (ref 136–145)
SODIUM SERPL-SCNC: 123 MMOL/L (ref 136–145)
SODIUM SERPL-SCNC: 123 MMOL/L (ref 136–145)
SODIUM SERPL-SCNC: 125 MMOL/L (ref 136–145)
SODIUM SERPL-SCNC: 125 MMOL/L (ref 136–145)
SODIUM SERPL-SCNC: 126 MMOL/L (ref 136–145)
SODIUM SERPL-SCNC: 127 MMOL/L (ref 136–145)
SODIUM SERPL-SCNC: 128 MMOL/L (ref 136–145)
SODIUM SERPL-SCNC: 129 MMOL/L (ref 136–145)
SODIUM SERPL-SCNC: 130 MMOL/L (ref 136–145)
SODIUM SERPL-SCNC: 131 MMOL/L (ref 136–145)
SODIUM SERPL-SCNC: 132 MMOL/L (ref 136–145)
SODIUM SERPL-SCNC: 133 MMOL/L (ref 136–145)
SODIUM SERPL-SCNC: 134 MMOL/L (ref 136–145)
SODIUM SERPL-SCNC: 135 MMOL/L (ref 136–145)
SODIUM SERPL-SCNC: 136 MMOL/L (ref 136–145)
SODIUM SERPL-SCNC: 137 MMOL/L (ref 136–145)
SODIUM SERPL-SCNC: 138 MMOL/L (ref 136–145)
SODIUM SERPL-SCNC: 139 MMOL/L (ref 136–145)
SODIUM SERPL-SCNC: 140 MMOL/L (ref 136–145)
SODIUM SERPL-SCNC: 141 MMOL/L (ref 136–145)
SODIUM SERPL-SCNC: 141 MMOL/L (ref 136–145)
SODIUM SERPL-SCNC: 143 MMOL/L (ref 136–145)
SODIUM UR-SCNC: 38 MMOL/L
SODIUM/CREAT UR-RTO: 45 MMOL/G CREAT
SP GR UR STRIP.AUTO: 1.01
SP GR UR STRIP.AUTO: 1.02
SP GR UR STRIP.AUTO: 1.03
SP GR UR STRIP.AUTO: 1.03
SPECIMEN DRAWN FROM PATIENT: ABNORMAL
SPECIMEN DRAWN FROM PATIENT: ABNORMAL
SQUAMOUS #/AREA URNS AUTO: ABNORMAL /HPF
SQUAMOUS #/AREA URNS AUTO: ABNORMAL /HPF
STAPHYLOCOCCUS SPEC CULT: NORMAL
T AXIS: -82 DEGREES
T AXIS: -9 DEGREES
T AXIS: 2 DEGREES
T AXIS: 26 DEGREES
T AXIS: 27 DEGREES
T AXIS: 50 DEGREES
T AXIS: 50 DEGREES
T AXIS: 60 DEGREES
T GONDII IGG SER-ACNC: NONREACTIVE
T GONDII IGG SER-ACNC: NONREACTIVE
T GONDII IGM SER-ACNC: <3 AU/ML
T GONDII IGM SER-ACNC: <3 AU/ML
T OFFSET: 360 MS
T OFFSET: 373 MS
T OFFSET: 375 MS
T OFFSET: 380 MS
T OFFSET: 389 MS
T OFFSET: 389 MS
T OFFSET: 411 MS
T OFFSET: 446 MS
T-SPOT. TB INTERPRETATION: NEGATIVE
T3 SERPL-MCNC: 60 NG/DL (ref 60–200)
T3FREE SERPL-MCNC: 0.8 PG/ML (ref 2.3–4.2)
T3FREE SERPL-MCNC: 0.8 PG/ML (ref 2.3–4.2)
T3FREE SERPL-MCNC: 1.1 PG/ML (ref 2.3–4.2)
T3FREE SERPL-MCNC: 1.4 PG/ML (ref 2.3–4.2)
T4 FREE SERPL-MCNC: 0.63 NG/DL (ref 0.78–1.48)
T4 FREE SERPL-MCNC: 0.68 NG/DL (ref 0.78–1.48)
T4 FREE SERPL-MCNC: 0.7 NG/DL (ref 0.9–1.7)
T4 FREE SERPL-MCNC: 0.74 NG/DL (ref 0.78–1.48)
T4 FREE SERPL-MCNC: 0.97 NG/DL (ref 0.78–1.48)
T4 FREE SERPL-MCNC: 1.11 NG/DL (ref 0.78–1.48)
T4 FREE SERPL-MCNC: 1.27 NG/DL (ref 0.78–1.48)
T4 FREE SERPL-MCNC: 1.37 NG/DL (ref 0.78–1.48)
T4 FREE SERPL-MCNC: 2.19 NG/DL (ref 0.78–1.48)
T4 FREE SERPL-MCNC: 2.47 NG/DL (ref 0.78–1.48)
T4 SERPL-MCNC: 8.7 UG/DL (ref 4.5–11.1)
TACROLIMUS BLD-MCNC: 10 NG/ML
TACROLIMUS BLD-MCNC: 10.4 NG/ML
TACROLIMUS BLD-MCNC: 11.1 NG/ML
TACROLIMUS BLD-MCNC: 11.6 NG/ML
TACROLIMUS BLD-MCNC: 12.3 NG/ML
TACROLIMUS BLD-MCNC: 14.4 NG/ML
TACROLIMUS BLD-MCNC: 15 NG/ML
TACROLIMUS BLD-MCNC: 2 NG/ML
TACROLIMUS BLD-MCNC: 2.1 NG/ML
TACROLIMUS BLD-MCNC: 2.3 NG/ML
TACROLIMUS BLD-MCNC: 2.5 NG/ML
TACROLIMUS BLD-MCNC: 2.6 NG/ML
TACROLIMUS BLD-MCNC: 2.9 NG/ML
TACROLIMUS BLD-MCNC: 3.1 NG/ML
TACROLIMUS BLD-MCNC: 3.1 NG/ML
TACROLIMUS BLD-MCNC: 3.2 NG/ML
TACROLIMUS BLD-MCNC: 3.2 NG/ML
TACROLIMUS BLD-MCNC: 3.3 NG/ML
TACROLIMUS BLD-MCNC: 3.5 NG/ML
TACROLIMUS BLD-MCNC: 3.6 NG/ML
TACROLIMUS BLD-MCNC: 3.7 NG/ML
TACROLIMUS BLD-MCNC: 3.8 NG/ML
TACROLIMUS BLD-MCNC: 3.8 NG/ML
TACROLIMUS BLD-MCNC: 3.9 NG/ML
TACROLIMUS BLD-MCNC: 4 NG/ML
TACROLIMUS BLD-MCNC: 4.1 NG/ML
TACROLIMUS BLD-MCNC: 4.1 NG/ML
TACROLIMUS BLD-MCNC: 4.2 NG/ML
TACROLIMUS BLD-MCNC: 4.3 NG/ML
TACROLIMUS BLD-MCNC: 4.4 NG/ML
TACROLIMUS BLD-MCNC: 4.7 NG/ML
TACROLIMUS BLD-MCNC: 4.9 NG/ML
TACROLIMUS BLD-MCNC: 5 NG/ML
TACROLIMUS BLD-MCNC: 5 NG/ML
TACROLIMUS BLD-MCNC: 5.5 NG/ML
TACROLIMUS BLD-MCNC: 5.5 NG/ML
TACROLIMUS BLD-MCNC: 5.9 NG/ML
TACROLIMUS BLD-MCNC: 6.1 NG/ML
TACROLIMUS BLD-MCNC: 6.2 NG/ML
TACROLIMUS BLD-MCNC: 6.4 NG/ML
TACROLIMUS BLD-MCNC: 6.9 NG/ML
TACROLIMUS BLD-MCNC: 7.1 NG/ML
TACROLIMUS BLD-MCNC: 7.4 NG/ML
TACROLIMUS BLD-MCNC: 7.4 NG/ML
TACROLIMUS BLD-MCNC: 7.5 NG/ML
TACROLIMUS BLD-MCNC: 7.7 NG/ML
TACROLIMUS BLD-MCNC: 7.8 NG/ML
TACROLIMUS BLD-MCNC: 7.9 NG/ML
TACROLIMUS BLD-MCNC: 8 NG/ML
TACROLIMUS BLD-MCNC: 8.9 NG/ML
TACROLIMUS BLD-MCNC: <2 NG/ML
TARGETS BLD QL SMEAR: ABNORMAL
TARGETS BLD QL SMEAR: NORMAL
TEST COMMENT: ABNORMAL
TIBC SERPL-MCNC: 326 UG/DL (ref 240–445)
TOTAL CELLS COUNTED BLD: 100
TOTAL CELLS COUNTED BLD: 101
TOTAL CELLS COUNTED BLD: 111
TOTAL CELLS COUNTED BLD: 112
TOTAL CELLS COUNTED BLD: 112
TOTAL CELLS COUNTED BLD: 113
TOTAL CELLS COUNTED BLD: 113
TOTAL CELLS COUNTED BLD: 114
TOTAL CELLS COUNTED BLD: 115
TOTAL CELLS COUNTED BLD: 115
TOTAL CELLS COUNTED BLD: 116
TOTAL CELLS COUNTED BLD: 117
TOTAL CELLS COUNTED BLD: 118
TOTAL CELLS COUNTED BLD: 118
TOTAL CELLS COUNTED BLD: 64
TOXOPLASMA GONDII PCR QUANTITATIVE,WHOLE BLOOD: NOT DETECTED COPIES/ML
TREPONEMA PALLIDUM IGG+IGM AB [PRESENCE] IN SERUM OR PLASMA BY IMMUNOASSAY: NONREACTIVE
TRICUSPID ANNULAR PLANE SYSTOLIC EXCURSION: 0.7 CM
TRIGL SERPL-MCNC: 142 MG/DL (ref 0–149)
TSH SERPL DL<=0.05 MIU/L-ACNC: 10.13 MIU/L (ref 0.27–4.2)
TSH SERPL-ACNC: 10.69 MIU/L (ref 0.44–3.98)
TSH SERPL-ACNC: 12.02 MIU/L (ref 0.44–3.98)
TSH SERPL-ACNC: 14.87 MIU/L (ref 0.44–3.98)
TSH SERPL-ACNC: 15.16 MIU/L (ref 0.44–3.98)
TSH SERPL-ACNC: 15.88 MIU/L (ref 0.44–3.98)
TSH SERPL-ACNC: 19.48 MIU/L (ref 0.44–3.98)
TSH SERPL-ACNC: 20.74 MIU/L (ref 0.44–3.98)
TSH SERPL-ACNC: 21.57 MIU/L (ref 0.44–3.98)
TSH SERPL-ACNC: 35.92 MIU/L (ref 0.44–3.98)
UFH PPP CHRO-ACNC: 0.1 IU/ML
UFH PPP CHRO-ACNC: 0.2 IU/ML
UFH PPP CHRO-ACNC: 0.3 IU/ML
UFH PPP CHRO-ACNC: 0.4 IU/ML
UFH PPP CHRO-ACNC: 0.5 IU/ML
UFH PPP CHRO-ACNC: 0.6 IU/ML
UFH PPP CHRO-ACNC: 0.6 IU/ML
UFH PPP CHRO-ACNC: 0.7 IU/ML
UFH PPP CHRO-ACNC: 0.8 IU/ML
UFH PPP CHRO-ACNC: 0.9 IU/ML
UFH PPP CHRO-ACNC: 0.9 IU/ML
UFH PPP CHRO-ACNC: 1 IU/ML
UFH PPP CHRO-ACNC: 1.5 IU/ML
UFH PPP CHRO-ACNC: <0.1 IU/ML
UIBC SERPL-MCNC: 284 UG/DL (ref 110–370)
UNIT ABO: NORMAL
UNIT NUMBER: NORMAL
UNIT RH: NORMAL
UNIT VOLUME: 195
UNIT VOLUME: 200
UNIT VOLUME: 203
UNIT VOLUME: 203
UNIT VOLUME: 205
UNIT VOLUME: 206
UNIT VOLUME: 208
UNIT VOLUME: 212
UNIT VOLUME: 216
UNIT VOLUME: 216
UNIT VOLUME: 217
UNIT VOLUME: 218
UNIT VOLUME: 219
UNIT VOLUME: 219
UNIT VOLUME: 220
UNIT VOLUME: 220
UNIT VOLUME: 221
UNIT VOLUME: 222
UNIT VOLUME: 223
UNIT VOLUME: 224
UNIT VOLUME: 224
UNIT VOLUME: 227
UNIT VOLUME: 229
UNIT VOLUME: 231
UNIT VOLUME: 231
UNIT VOLUME: 233
UNIT VOLUME: 249
UNIT VOLUME: 265
UNIT VOLUME: 266
UNIT VOLUME: 272
UNIT VOLUME: 275
UNIT VOLUME: 277
UNIT VOLUME: 278
UNIT VOLUME: 278
UNIT VOLUME: 279
UNIT VOLUME: 279
UNIT VOLUME: 280
UNIT VOLUME: 281
UNIT VOLUME: 281
UNIT VOLUME: 282
UNIT VOLUME: 283
UNIT VOLUME: 284
UNIT VOLUME: 286
UNIT VOLUME: 286
UNIT VOLUME: 287
UNIT VOLUME: 289
UNIT VOLUME: 290
UNIT VOLUME: 292
UNIT VOLUME: 294
UNIT VOLUME: 294
UNIT VOLUME: 296
UNIT VOLUME: 297
UNIT VOLUME: 299
UNIT VOLUME: 300
UNIT VOLUME: 300
UNIT VOLUME: 301
UNIT VOLUME: 302
UNIT VOLUME: 303
UNIT VOLUME: 304
UNIT VOLUME: 305
UNIT VOLUME: 306
UNIT VOLUME: 308
UNIT VOLUME: 310
UNIT VOLUME: 313
UNIT VOLUME: 315
UNIT VOLUME: 316
UNIT VOLUME: 318
UNIT VOLUME: 319
UNIT VOLUME: 320
UNIT VOLUME: 321
UNIT VOLUME: 322
UNIT VOLUME: 322
UNIT VOLUME: 323
UNIT VOLUME: 323
UNIT VOLUME: 324
UNIT VOLUME: 325
UNIT VOLUME: 327
UNIT VOLUME: 329
UNIT VOLUME: 330
UNIT VOLUME: 330
UNIT VOLUME: 331
UNIT VOLUME: 332
UNIT VOLUME: 333
UNIT VOLUME: 333
UNIT VOLUME: 334
UNIT VOLUME: 335
UNIT VOLUME: 336
UNIT VOLUME: 337
UNIT VOLUME: 339
UNIT VOLUME: 340
UNIT VOLUME: 340
UNIT VOLUME: 342
UNIT VOLUME: 342
UNIT VOLUME: 345
UNIT VOLUME: 345
UNIT VOLUME: 347
UNIT VOLUME: 348
UNIT VOLUME: 350
UNIT VOLUME: 355
UNIT VOLUME: 357
UNIT VOLUME: 359
UNIT VOLUME: 362
UNIT VOLUME: 364
UNIT VOLUME: 368
UNIT VOLUME: 388
UREA/CREAT UR-SRTO: 4.5 G/G CREAT
UROBILINOGEN UR STRIP.AUTO-MCNC: NORMAL MG/DL
UUN UR-MCNC: 377 MG/DL
VANCOMYCIN SERPL-MCNC: 13.3 UG/ML (ref 5–20)
VANCOMYCIN SERPL-MCNC: 13.5 UG/ML (ref 5–20)
VANCOMYCIN SERPL-MCNC: 13.6 UG/ML (ref 5–20)
VANCOMYCIN SERPL-MCNC: 14.8 UG/ML (ref 5–20)
VANCOMYCIN SERPL-MCNC: 16.3 UG/ML (ref 5–20)
VANCOMYCIN SERPL-MCNC: 17 UG/ML (ref 5–20)
VANCOMYCIN SERPL-MCNC: 27.3 UG/ML (ref 5–20)
VANCOMYCIN TROUGH SERPL-MCNC: 13.9 UG/ML (ref 5–20)
VANCOMYCIN TROUGH SERPL-MCNC: 14 UG/ML (ref 5–20)
VANCOMYCIN TROUGH SERPL-MCNC: 16.7 UG/ML (ref 5–20)
VANCOMYCIN TROUGH SERPL-MCNC: 18.4 UG/ML (ref 5–20)
VANCOMYCIN TROUGH SERPL-MCNC: 21.2 UG/ML (ref 5–20)
VANCOMYCIN TROUGH SERPL-MCNC: 26 UG/ML (ref 5–20)
VARIANT LYMPHS # BLD MANUAL: 0.04 X10*3/UL (ref 0–0.5)
VARIANT LYMPHS # BLD MANUAL: 0.07 X10*3/UL (ref 0–0.5)
VARIANT LYMPHS # BLD MANUAL: 0.07 X10*3/UL (ref 0–0.5)
VARIANT LYMPHS # BLD MANUAL: 0.43 X10*3/UL (ref 0–0.5)
VARIANT LYMPHS # BLD MANUAL: 0.58 X10*3/UL (ref 0–0.5)
VARIANT LYMPHS NFR BLD: 0.9 %
VARIANT LYMPHS NFR BLD: 0.9 %
VARIANT LYMPHS NFR BLD: 1 %
VARIANT LYMPHS NFR BLD: 2.6 %
VARIANT LYMPHS NFR BLD: 3.4 %
VARICELLA ZOSTER IGG INDEX: 2.7 IA
VARICELLA ZOSTER IGG INDEX: 6 IA
VENTILATOR MODE: ABNORMAL
VENTRICULAR RATE: 108 BPM
VENTRICULAR RATE: 116 BPM
VENTRICULAR RATE: 124 BPM
VENTRICULAR RATE: 128 BPM
VENTRICULAR RATE: 131 BPM
VENTRICULAR RATE: 135 BPM
VENTRICULAR RATE: 149 BPM
VENTRICULAR RATE: 96 BPM
VIT B12 SERPL-MCNC: 1179 PG/ML (ref 211–911)
VLDL: 28 MG/DL (ref 0–40)
VWF CP ACT/NOR PPP CHRO: 43 %
VZV IGG SER QL IA: POSITIVE
VZV IGG SER QL IA: POSITIVE
VZV IGM SER IA-ACNC: 0 ISR
WBC # BLD AUTO: 1.3 X10*3/UL (ref 4.4–11.3)
WBC # BLD AUTO: 10 X10*3/UL (ref 4.4–11.3)
WBC # BLD AUTO: 10 X10*3/UL (ref 4.4–11.3)
WBC # BLD AUTO: 10.1 X10*3/UL (ref 4.4–11.3)
WBC # BLD AUTO: 10.2 X10*3/UL (ref 4.4–11.3)
WBC # BLD AUTO: 10.3 X10*3/UL (ref 4.4–11.3)
WBC # BLD AUTO: 10.4 X10*3/UL (ref 4.4–11.3)
WBC # BLD AUTO: 10.4 X10*3/UL (ref 4.4–11.3)
WBC # BLD AUTO: 10.5 X10*3/UL (ref 4.4–11.3)
WBC # BLD AUTO: 10.6 X10*3/UL (ref 4.4–11.3)
WBC # BLD AUTO: 10.8 X10*3/UL (ref 4.4–11.3)
WBC # BLD AUTO: 10.9 X10*3/UL (ref 4.4–11.3)
WBC # BLD AUTO: 10.9 X10*3/UL (ref 4.4–11.3)
WBC # BLD AUTO: 11.1 X10*3/UL (ref 4.4–11.3)
WBC # BLD AUTO: 11.3 X10*3/UL (ref 4.4–11.3)
WBC # BLD AUTO: 11.7 X10*3/UL (ref 4.4–11.3)
WBC # BLD AUTO: 11.8 X10*3/UL (ref 4.4–11.3)
WBC # BLD AUTO: 11.8 X10*3/UL (ref 4.4–11.3)
WBC # BLD AUTO: 12 X10*3/UL (ref 4.4–11.3)
WBC # BLD AUTO: 12.1 X10*3/UL (ref 4.4–11.3)
WBC # BLD AUTO: 12.2 X10*3/UL (ref 4.4–11.3)
WBC # BLD AUTO: 12.5 X10*3/UL (ref 4.4–11.3)
WBC # BLD AUTO: 12.6 X10*3/UL (ref 4.4–11.3)
WBC # BLD AUTO: 12.8 X10*3/UL (ref 4.4–11.3)
WBC # BLD AUTO: 12.9 X10*3/UL (ref 4.4–11.3)
WBC # BLD AUTO: 13 X10*3/UL (ref 4.4–11.3)
WBC # BLD AUTO: 13.1 X10*3/UL (ref 4.4–11.3)
WBC # BLD AUTO: 13.2 X10*3/UL (ref 4.4–11.3)
WBC # BLD AUTO: 13.6 X10*3/UL (ref 4.4–11.3)
WBC # BLD AUTO: 13.6 X10*3/UL (ref 4.4–11.3)
WBC # BLD AUTO: 13.8 X10*3/UL (ref 4.4–11.3)
WBC # BLD AUTO: 14 X10*3/UL (ref 4.4–11.3)
WBC # BLD AUTO: 14.3 X10*3/UL (ref 4.4–11.3)
WBC # BLD AUTO: 14.3 X10*3/UL (ref 4.4–11.3)
WBC # BLD AUTO: 14.6 X10*3/UL (ref 4.4–11.3)
WBC # BLD AUTO: 15.2 X10*3/UL (ref 4.4–11.3)
WBC # BLD AUTO: 15.3 X10*3/UL (ref 4.4–11.3)
WBC # BLD AUTO: 15.5 X10*3/UL (ref 4.4–11.3)
WBC # BLD AUTO: 16.2 X10*3/UL (ref 4.4–11.3)
WBC # BLD AUTO: 16.5 X10*3/UL (ref 4.4–11.3)
WBC # BLD AUTO: 17.5 X10*3/UL (ref 4.4–11.3)
WBC # BLD AUTO: 17.8 X10*3/UL (ref 4.4–11.3)
WBC # BLD AUTO: 18.6 X10*3/UL (ref 4.4–11.3)
WBC # BLD AUTO: 19.7 X10*3/UL (ref 4.4–11.3)
WBC # BLD AUTO: 20.5 X10*3/UL (ref 4.4–11.3)
WBC # BLD AUTO: 20.9 X10*3/UL (ref 4.4–11.3)
WBC # BLD AUTO: 21.5 X10*3/UL (ref 4.4–11.3)
WBC # BLD AUTO: 21.7 X10*3/UL (ref 4.4–11.3)
WBC # BLD AUTO: 21.9 X10*3/UL (ref 4.4–11.3)
WBC # BLD AUTO: 22.4 X10*3/UL (ref 4.4–11.3)
WBC # BLD AUTO: 22.5 X10*3/UL (ref 4.4–11.3)
WBC # BLD AUTO: 23.1 X10*3/UL (ref 4.4–11.3)
WBC # BLD AUTO: 23.1 X10*3/UL (ref 4.4–11.3)
WBC # BLD AUTO: 23.4 X10*3/UL (ref 4.4–11.3)
WBC # BLD AUTO: 23.4 X10*3/UL (ref 4.4–11.3)
WBC # BLD AUTO: 24.1 X10*3/UL (ref 4.4–11.3)
WBC # BLD AUTO: 24.1 X10*3/UL (ref 4.4–11.3)
WBC # BLD AUTO: 3.1 X10*3/UL (ref 4.4–11.3)
WBC # BLD AUTO: 3.3 X10*3/UL (ref 4.4–11.3)
WBC # BLD AUTO: 3.4 X10*3/UL (ref 4.4–11.3)
WBC # BLD AUTO: 3.6 X10*3/UL (ref 4.4–11.3)
WBC # BLD AUTO: 3.8 X10*3/UL (ref 4.4–11.3)
WBC # BLD AUTO: 4 X10*3/UL (ref 4.4–11.3)
WBC # BLD AUTO: 4.5 X10*3/UL (ref 4.4–11.3)
WBC # BLD AUTO: 4.6 X10*3/UL (ref 4.4–11.3)
WBC # BLD AUTO: 4.6 X10*3/UL (ref 4.4–11.3)
WBC # BLD AUTO: 4.9 X10*3/UL (ref 4.4–11.3)
WBC # BLD AUTO: 5 X10*3/UL (ref 4.4–11.3)
WBC # BLD AUTO: 5 X10*3/UL (ref 4.4–11.3)
WBC # BLD AUTO: 5.1 X10*3/UL (ref 4.4–11.3)
WBC # BLD AUTO: 5.2 X10*3/UL (ref 4.4–11.3)
WBC # BLD AUTO: 5.4 X10*3/UL (ref 4.4–11.3)
WBC # BLD AUTO: 5.5 X10*3/UL (ref 4.4–11.3)
WBC # BLD AUTO: 5.6 X10*3/UL (ref 4.4–11.3)
WBC # BLD AUTO: 6 X10*3/UL (ref 4.4–11.3)
WBC # BLD AUTO: 6 X10*3/UL (ref 4.4–11.3)
WBC # BLD AUTO: 6.1 X10*3/UL (ref 4.4–11.3)
WBC # BLD AUTO: 6.1 X10*3/UL (ref 4.4–11.3)
WBC # BLD AUTO: 6.2 X10*3/UL (ref 4.4–11.3)
WBC # BLD AUTO: 6.3 X10*3/UL (ref 4.4–11.3)
WBC # BLD AUTO: 6.4 X10*3/UL (ref 4.4–11.3)
WBC # BLD AUTO: 6.5 X10*3/UL (ref 4.4–11.3)
WBC # BLD AUTO: 6.5 X10*3/UL (ref 4.4–11.3)
WBC # BLD AUTO: 6.6 X10*3/UL (ref 4.4–11.3)
WBC # BLD AUTO: 6.7 X10*3/UL (ref 4.4–11.3)
WBC # BLD AUTO: 6.7 X10*3/UL (ref 4.4–11.3)
WBC # BLD AUTO: 6.8 X10*3/UL (ref 4.4–11.3)
WBC # BLD AUTO: 6.9 X10*3/UL (ref 4.4–11.3)
WBC # BLD AUTO: 7 X10*3/UL (ref 4.4–11.3)
WBC # BLD AUTO: 7 X10*3/UL (ref 4.4–11.3)
WBC # BLD AUTO: 7.2 X10*3/UL (ref 4.4–11.3)
WBC # BLD AUTO: 7.2 X10*3/UL (ref 4.4–11.3)
WBC # BLD AUTO: 7.3 X10*3/UL (ref 4.4–11.3)
WBC # BLD AUTO: 7.3 X10*3/UL (ref 4.4–11.3)
WBC # BLD AUTO: 7.4 X10*3/UL (ref 4.4–11.3)
WBC # BLD AUTO: 7.6 X10*3/UL (ref 4.4–11.3)
WBC # BLD AUTO: 7.6 X10*3/UL (ref 4.4–11.3)
WBC # BLD AUTO: 7.7 X10*3/UL (ref 4.4–11.3)
WBC # BLD AUTO: 7.8 X10*3/UL (ref 4.4–11.3)
WBC # BLD AUTO: 7.8 X10*3/UL (ref 4.4–11.3)
WBC # BLD AUTO: 7.9 X10*3/UL (ref 4.4–11.3)
WBC # BLD AUTO: 8 X10*3/UL (ref 4.4–11.3)
WBC # BLD AUTO: 8 X10*3/UL (ref 4.4–11.3)
WBC # BLD AUTO: 8.1 X10*3/UL (ref 4.4–11.3)
WBC # BLD AUTO: 8.1 X10*3/UL (ref 4.4–11.3)
WBC # BLD AUTO: 8.2 X10*3/UL (ref 4.4–11.3)
WBC # BLD AUTO: 8.2 X10*3/UL (ref 4.4–11.3)
WBC # BLD AUTO: 8.3 X10*3/UL (ref 4.4–11.3)
WBC # BLD AUTO: 8.4 X10*3/UL (ref 4.4–11.3)
WBC # BLD AUTO: 8.6 X10*3/UL (ref 4.4–11.3)
WBC # BLD AUTO: 8.7 X10*3/UL (ref 4.4–11.3)
WBC # BLD AUTO: 8.8 X10*3/UL (ref 4.4–11.3)
WBC # BLD AUTO: 8.8 X10*3/UL (ref 4.4–11.3)
WBC # BLD AUTO: 8.9 X10*3/UL (ref 4.4–11.3)
WBC # BLD AUTO: 9.2 X10*3/UL (ref 4.4–11.3)
WBC # BLD AUTO: 9.3 X10*3/UL (ref 4.4–11.3)
WBC # BLD AUTO: 9.4 X10*3/UL (ref 4.4–11.3)
WBC # BLD AUTO: 9.4 X10*3/UL (ref 4.4–11.3)
WBC # BLD AUTO: 9.7 X10*3/UL (ref 4.4–11.3)
WBC # BLD AUTO: 9.8 X10*3/UL (ref 4.4–11.3)
WBC # BLD AUTO: 9.9 X10*3/UL (ref 4.4–11.3)
WBC #/AREA URNS AUTO: ABNORMAL /HPF
WBC #/AREA URNS AUTO: NORMAL /HPF
XM INTEP: NORMAL
YEAST VAG WET PREP-#/AREA: NORMAL

## 2024-01-01 PROCEDURE — 2500000004 HC RX 250 GENERAL PHARMACY W/ HCPCS (ALT 636 FOR OP/ED): Performed by: NURSE PRACTITIONER

## 2024-01-01 PROCEDURE — 84702 CHORIONIC GONADOTROPIN TEST: CPT

## 2024-01-01 PROCEDURE — 83735 ASSAY OF MAGNESIUM: CPT | Performed by: NURSE PRACTITIONER

## 2024-01-01 PROCEDURE — 85347 COAGULATION TIME ACTIVATED: CPT

## 2024-01-01 PROCEDURE — 2500000001 HC RX 250 WO HCPCS SELF ADMINISTERED DRUGS (ALT 637 FOR MEDICARE OP)

## 2024-01-01 PROCEDURE — 83605 ASSAY OF LACTIC ACID: CPT | Performed by: STUDENT IN AN ORGANIZED HEALTH CARE EDUCATION/TRAINING PROGRAM

## 2024-01-01 PROCEDURE — 80197 ASSAY OF TACROLIMUS: CPT | Performed by: NURSE PRACTITIONER

## 2024-01-01 PROCEDURE — 84132 ASSAY OF SERUM POTASSIUM: CPT | Mod: 91 | Performed by: STUDENT IN AN ORGANIZED HEALTH CARE EDUCATION/TRAINING PROGRAM

## 2024-01-01 PROCEDURE — 80053 COMPREHEN METABOLIC PANEL: CPT | Performed by: STUDENT IN AN ORGANIZED HEALTH CARE EDUCATION/TRAINING PROGRAM

## 2024-01-01 PROCEDURE — 2500000004 HC RX 250 GENERAL PHARMACY W/ HCPCS (ALT 636 FOR OP/ED)

## 2024-01-01 PROCEDURE — 93922 UPR/L XTREMITY ART 2 LEVELS: CPT

## 2024-01-01 PROCEDURE — 82947 ASSAY GLUCOSE BLOOD QUANT: CPT

## 2024-01-01 PROCEDURE — 2500000001 HC RX 250 WO HCPCS SELF ADMINISTERED DRUGS (ALT 637 FOR MEDICARE OP): Performed by: STUDENT IN AN ORGANIZED HEALTH CARE EDUCATION/TRAINING PROGRAM

## 2024-01-01 PROCEDURE — 83615 LACTATE (LD) (LDH) ENZYME: CPT | Performed by: STUDENT IN AN ORGANIZED HEALTH CARE EDUCATION/TRAINING PROGRAM

## 2024-01-01 PROCEDURE — 87186 SC STD MICRODIL/AGAR DIL: CPT

## 2024-01-01 PROCEDURE — C9248 INJ, CLEVIDIPINE BUTYRATE: HCPCS | Performed by: STUDENT IN AN ORGANIZED HEALTH CARE EDUCATION/TRAINING PROGRAM

## 2024-01-01 PROCEDURE — 93325 DOPPLER ECHO COLOR FLOW MAPG: CPT | Performed by: INTERNAL MEDICINE

## 2024-01-01 PROCEDURE — 85610 PROTHROMBIN TIME: CPT | Performed by: NURSE PRACTITIONER

## 2024-01-01 PROCEDURE — 74018 RADEX ABDOMEN 1 VIEW: CPT | Performed by: RADIOLOGY

## 2024-01-01 PROCEDURE — 71045 X-RAY EXAM CHEST 1 VIEW: CPT | Performed by: RADIOLOGY

## 2024-01-01 PROCEDURE — 2500000001 HC RX 250 WO HCPCS SELF ADMINISTERED DRUGS (ALT 637 FOR MEDICARE OP): Performed by: NURSE PRACTITIONER

## 2024-01-01 PROCEDURE — 93505 ENDOMYOCARDIAL BIOPSY: CPT | Performed by: INTERNAL MEDICINE

## 2024-01-01 PROCEDURE — 76770 US EXAM ABDO BACK WALL COMP: CPT

## 2024-01-01 PROCEDURE — P9045 ALBUMIN (HUMAN), 5%, 250 ML: HCPCS | Mod: JZ

## 2024-01-01 PROCEDURE — 2020000001 HC ICU ROOM DAILY

## 2024-01-01 PROCEDURE — 2500000004 HC RX 250 GENERAL PHARMACY W/ HCPCS (ALT 636 FOR OP/ED): Performed by: STUDENT IN AN ORGANIZED HEALTH CARE EDUCATION/TRAINING PROGRAM

## 2024-01-01 PROCEDURE — A4217 STERILE WATER/SALINE, 500 ML: HCPCS | Performed by: STUDENT IN AN ORGANIZED HEALTH CARE EDUCATION/TRAINING PROGRAM

## 2024-01-01 PROCEDURE — 82330 ASSAY OF CALCIUM: CPT | Performed by: STUDENT IN AN ORGANIZED HEALTH CARE EDUCATION/TRAINING PROGRAM

## 2024-01-01 PROCEDURE — 82810 BLOOD GASES O2 SAT ONLY: CPT

## 2024-01-01 PROCEDURE — 80162 ASSAY OF DIGOXIN TOTAL: CPT

## 2024-01-01 PROCEDURE — 2500000002 HC RX 250 W HCPCS SELF ADMINISTERED DRUGS (ALT 637 FOR MEDICARE OP, ALT 636 FOR OP/ED): Mod: MUE | Performed by: NURSE PRACTITIONER

## 2024-01-01 PROCEDURE — 99233 SBSQ HOSP IP/OBS HIGH 50: CPT | Performed by: HOSPITALIST

## 2024-01-01 PROCEDURE — 71045 X-RAY EXAM CHEST 1 VIEW: CPT

## 2024-01-01 PROCEDURE — 80195 ASSAY OF SIROLIMUS: CPT | Performed by: STUDENT IN AN ORGANIZED HEALTH CARE EDUCATION/TRAINING PROGRAM

## 2024-01-01 PROCEDURE — 93308 TTE F-UP OR LMTD: CPT | Performed by: INTERNAL MEDICINE

## 2024-01-01 PROCEDURE — 97530 THERAPEUTIC ACTIVITIES: CPT | Mod: GO

## 2024-01-01 PROCEDURE — 85610 PROTHROMBIN TIME: CPT | Performed by: STUDENT IN AN ORGANIZED HEALTH CARE EDUCATION/TRAINING PROGRAM

## 2024-01-01 PROCEDURE — 82947 ASSAY GLUCOSE BLOOD QUANT: CPT | Mod: MUE

## 2024-01-01 PROCEDURE — 2500000005 HC RX 250 GENERAL PHARMACY W/O HCPCS: Performed by: NURSE PRACTITIONER

## 2024-01-01 PROCEDURE — 93750 INTERROGATION VAD IN PERSON: CPT

## 2024-01-01 PROCEDURE — 83010 ASSAY OF HAPTOGLOBIN QUANT: CPT

## 2024-01-01 PROCEDURE — 82248 BILIRUBIN DIRECT: CPT | Performed by: STUDENT IN AN ORGANIZED HEALTH CARE EDUCATION/TRAINING PROGRAM

## 2024-01-01 PROCEDURE — 37799 UNLISTED PX VASCULAR SURGERY: CPT | Performed by: NURSE PRACTITIONER

## 2024-01-01 PROCEDURE — 82805 BLOOD GASES W/O2 SATURATION: CPT | Performed by: NURSE PRACTITIONER

## 2024-01-01 PROCEDURE — 90935 HEMODIALYSIS ONE EVALUATION: CPT | Performed by: INTERNAL MEDICINE

## 2024-01-01 PROCEDURE — 80195 ASSAY OF SIROLIMUS: CPT

## 2024-01-01 PROCEDURE — 02HP32Z INSERTION OF MONITORING DEVICE INTO PULMONARY TRUNK, PERCUTANEOUS APPROACH: ICD-10-PCS | Performed by: STUDENT IN AN ORGANIZED HEALTH CARE EDUCATION/TRAINING PROGRAM

## 2024-01-01 PROCEDURE — 93321 DOPPLER ECHO F-UP/LMTD STD: CPT | Performed by: INTERNAL MEDICINE

## 2024-01-01 PROCEDURE — 85025 COMPLETE CBC W/AUTO DIFF WBC: CPT | Performed by: NURSE PRACTITIONER

## 2024-01-01 PROCEDURE — 2500000004 HC RX 250 GENERAL PHARMACY W/ HCPCS (ALT 636 FOR OP/ED): Mod: JZ

## 2024-01-01 PROCEDURE — 99291 CRITICAL CARE FIRST HOUR: CPT | Performed by: INTERNAL MEDICINE

## 2024-01-01 PROCEDURE — 84439 ASSAY OF FREE THYROXINE: CPT | Performed by: INTERNAL MEDICINE

## 2024-01-01 PROCEDURE — 88346 IMFLUOR 1ST 1ANTB STAIN PX: CPT | Performed by: PATHOLOGY

## 2024-01-01 PROCEDURE — 36514 APHERESIS PLASMA: CPT | Performed by: STUDENT IN AN ORGANIZED HEALTH CARE EDUCATION/TRAINING PROGRAM

## 2024-01-01 PROCEDURE — 2720000007 HC OR 272 NO HCPCS: Performed by: THORACIC SURGERY (CARDIOTHORACIC VASCULAR SURGERY)

## 2024-01-01 PROCEDURE — 86708 HEPATITIS A ANTIBODY: CPT | Performed by: NURSE PRACTITIONER

## 2024-01-01 PROCEDURE — 99223 1ST HOSP IP/OBS HIGH 75: CPT | Performed by: INTERNAL MEDICINE

## 2024-01-01 PROCEDURE — 85045 AUTOMATED RETICULOCYTE COUNT: CPT | Performed by: NURSE PRACTITIONER

## 2024-01-01 PROCEDURE — 2500000002 HC RX 250 W HCPCS SELF ADMINISTERED DRUGS (ALT 637 FOR MEDICARE OP, ALT 636 FOR OP/ED): Performed by: NURSE PRACTITIONER

## 2024-01-01 PROCEDURE — 94003 VENT MGMT INPAT SUBQ DAY: CPT

## 2024-01-01 PROCEDURE — 90945 DIALYSIS ONE EVALUATION: CPT | Performed by: HOSPITALIST

## 2024-01-01 PROCEDURE — 99233 SBSQ HOSP IP/OBS HIGH 50: CPT | Performed by: NURSE PRACTITIONER

## 2024-01-01 PROCEDURE — 83735 ASSAY OF MAGNESIUM: CPT | Mod: 91

## 2024-01-01 PROCEDURE — 82248 BILIRUBIN DIRECT: CPT

## 2024-01-01 PROCEDURE — 83615 LACTATE (LD) (LDH) ENZYME: CPT | Performed by: NURSE PRACTITIONER

## 2024-01-01 PROCEDURE — 86706 HEP B SURFACE ANTIBODY: CPT | Performed by: STUDENT IN AN ORGANIZED HEALTH CARE EDUCATION/TRAINING PROGRAM

## 2024-01-01 PROCEDURE — 85384 FIBRINOGEN ACTIVITY: CPT

## 2024-01-01 PROCEDURE — 2500000004 HC RX 250 GENERAL PHARMACY W/ HCPCS (ALT 636 FOR OP/ED): Performed by: INTERNAL MEDICINE

## 2024-01-01 PROCEDURE — 99291 CRITICAL CARE FIRST HOUR: CPT | Performed by: NURSE PRACTITIONER

## 2024-01-01 PROCEDURE — 85027 COMPLETE CBC AUTOMATED: CPT

## 2024-01-01 PROCEDURE — 82550 ASSAY OF CK (CPK): CPT

## 2024-01-01 PROCEDURE — 99233 SBSQ HOSP IP/OBS HIGH 50: CPT | Performed by: REGISTERED NURSE

## 2024-01-01 PROCEDURE — 90937 HEMODIALYSIS REPEATED EVAL: CPT

## 2024-01-01 PROCEDURE — 2500000005 HC RX 250 GENERAL PHARMACY W/O HCPCS

## 2024-01-01 PROCEDURE — 37799 UNLISTED PX VASCULAR SURGERY: CPT

## 2024-01-01 PROCEDURE — 87533 HHV-6 DNA QUANT: CPT | Performed by: NURSE PRACTITIONER

## 2024-01-01 PROCEDURE — 82805 BLOOD GASES W/O2 SATURATION: CPT

## 2024-01-01 PROCEDURE — 82435 ASSAY OF BLOOD CHLORIDE: CPT | Performed by: STUDENT IN AN ORGANIZED HEALTH CARE EDUCATION/TRAINING PROGRAM

## 2024-01-01 PROCEDURE — 85384 FIBRINOGEN ACTIVITY: CPT | Performed by: STUDENT IN AN ORGANIZED HEALTH CARE EDUCATION/TRAINING PROGRAM

## 2024-01-01 PROCEDURE — 93325 DOPPLER ECHO COLOR FLOW MAPG: CPT

## 2024-01-01 PROCEDURE — 99291 CRITICAL CARE FIRST HOUR: CPT | Performed by: STUDENT IN AN ORGANIZED HEALTH CARE EDUCATION/TRAINING PROGRAM

## 2024-01-01 PROCEDURE — 2500000006 HC RX 250 W HCPCS SELF ADMINISTERED DRUGS (ALT 637 FOR ALL PAYERS): Mod: MUE | Performed by: NURSE PRACTITIONER

## 2024-01-01 PROCEDURE — 93970 EXTREMITY STUDY: CPT

## 2024-01-01 PROCEDURE — 87799 DETECT AGENT NOS DNA QUANT: CPT | Performed by: STUDENT IN AN ORGANIZED HEALTH CARE EDUCATION/TRAINING PROGRAM

## 2024-01-01 PROCEDURE — 85610 PROTHROMBIN TIME: CPT

## 2024-01-01 PROCEDURE — 99152 MOD SED SAME PHYS/QHP 5/>YRS: CPT | Performed by: INTERNAL MEDICINE

## 2024-01-01 PROCEDURE — 97530 THERAPEUTIC ACTIVITIES: CPT | Mod: GP

## 2024-01-01 PROCEDURE — 86901 BLOOD TYPING SEROLOGIC RH(D): CPT

## 2024-01-01 PROCEDURE — 82810 BLOOD GASES O2 SAT ONLY: CPT | Performed by: NURSE PRACTITIONER

## 2024-01-01 PROCEDURE — 82435 ASSAY OF BLOOD CHLORIDE: CPT | Performed by: NURSE PRACTITIONER

## 2024-01-01 PROCEDURE — 2500000006 HC RX 250 W HCPCS SELF ADMINISTERED DRUGS (ALT 637 FOR ALL PAYERS): Performed by: NURSE PRACTITIONER

## 2024-01-01 PROCEDURE — 33949 ECMO/ECLS DAILY MGMT ARTERY: CPT | Performed by: EMERGENCY MEDICINE

## 2024-01-01 PROCEDURE — 88350 IMFLUOR EA ADDL 1ANTB STN PX: CPT | Performed by: PATHOLOGY

## 2024-01-01 PROCEDURE — 85027 COMPLETE CBC AUTOMATED: CPT | Performed by: STUDENT IN AN ORGANIZED HEALTH CARE EDUCATION/TRAINING PROGRAM

## 2024-01-01 PROCEDURE — 82746 ASSAY OF FOLIC ACID SERUM: CPT

## 2024-01-01 PROCEDURE — 1100000001 HC PRIVATE ROOM DAILY

## 2024-01-01 PROCEDURE — 36415 COLL VENOUS BLD VENIPUNCTURE: CPT

## 2024-01-01 PROCEDURE — 80069 RENAL FUNCTION PANEL: CPT | Mod: CCI | Performed by: NURSE PRACTITIONER

## 2024-01-01 PROCEDURE — 3600000011 HC PERFUSION TIME - INITIAL BASE CHARGE: Performed by: THORACIC SURGERY (CARDIOTHORACIC VASCULAR SURGERY)

## 2024-01-01 PROCEDURE — P9016 RBC LEUKOCYTES REDUCED: HCPCS

## 2024-01-01 PROCEDURE — 70450 CT HEAD/BRAIN W/O DYE: CPT

## 2024-01-01 PROCEDURE — 92526 ORAL FUNCTION THERAPY: CPT | Mod: GN | Performed by: SPEECH-LANGUAGE PATHOLOGIST

## 2024-01-01 PROCEDURE — 3600000012 HC PERFUSION TIME - EACH INCREMENTAL 1 MINUTE: Performed by: THORACIC SURGERY (CARDIOTHORACIC VASCULAR SURGERY)

## 2024-01-01 PROCEDURE — A4217 STERILE WATER/SALINE, 500 ML: HCPCS | Performed by: NURSE PRACTITIONER

## 2024-01-01 PROCEDURE — 85250 CLOT FACTOR IX PTC/CHRSTMAS: CPT | Performed by: STUDENT IN AN ORGANIZED HEALTH CARE EDUCATION/TRAINING PROGRAM

## 2024-01-01 PROCEDURE — 2500000002 HC RX 250 W HCPCS SELF ADMINISTERED DRUGS (ALT 637 FOR MEDICARE OP, ALT 636 FOR OP/ED): Performed by: STUDENT IN AN ORGANIZED HEALTH CARE EDUCATION/TRAINING PROGRAM

## 2024-01-01 PROCEDURE — 93005 ELECTROCARDIOGRAM TRACING: CPT

## 2024-01-01 PROCEDURE — 84443 ASSAY THYROID STIM HORMONE: CPT

## 2024-01-01 PROCEDURE — 93451 RIGHT HEART CATH: CPT | Performed by: INTERNAL MEDICINE

## 2024-01-01 PROCEDURE — 99231 SBSQ HOSP IP/OBS SF/LOW 25: CPT | Performed by: INTERNAL MEDICINE

## 2024-01-01 PROCEDURE — 99291 CRITICAL CARE FIRST HOUR: CPT | Performed by: ANESTHESIOLOGY

## 2024-01-01 PROCEDURE — 86663 EPSTEIN-BARR ANTIBODY: CPT | Performed by: NURSE PRACTITIONER

## 2024-01-01 PROCEDURE — 33949 ECMO/ECLS DAILY MGMT ARTERY: CPT

## 2024-01-01 PROCEDURE — 93308 TTE F-UP OR LMTD: CPT | Performed by: EMERGENCY MEDICINE

## 2024-01-01 PROCEDURE — 84153 ASSAY OF PSA TOTAL: CPT | Performed by: NURSE PRACTITIONER

## 2024-01-01 PROCEDURE — A4217 STERILE WATER/SALINE, 500 ML: HCPCS

## 2024-01-01 PROCEDURE — 82248 BILIRUBIN DIRECT: CPT | Performed by: NURSE PRACTITIONER

## 2024-01-01 PROCEDURE — 37799 UNLISTED PX VASCULAR SURGERY: CPT | Performed by: STUDENT IN AN ORGANIZED HEALTH CARE EDUCATION/TRAINING PROGRAM

## 2024-01-01 PROCEDURE — 99233 SBSQ HOSP IP/OBS HIGH 50: CPT

## 2024-01-01 PROCEDURE — C1894 INTRO/SHEATH, NON-LASER: HCPCS | Performed by: INTERNAL MEDICINE

## 2024-01-01 PROCEDURE — 80197 ASSAY OF TACROLIMUS: CPT

## 2024-01-01 PROCEDURE — 99223 1ST HOSP IP/OBS HIGH 75: CPT | Performed by: STUDENT IN AN ORGANIZED HEALTH CARE EDUCATION/TRAINING PROGRAM

## 2024-01-01 PROCEDURE — 83880 ASSAY OF NATRIURETIC PEPTIDE: CPT | Performed by: NURSE PRACTITIONER

## 2024-01-01 PROCEDURE — 3E0G76Z INTRODUCTION OF NUTRITIONAL SUBSTANCE INTO UPPER GI, VIA NATURAL OR ARTIFICIAL OPENING: ICD-10-PCS | Performed by: STUDENT IN AN ORGANIZED HEALTH CARE EDUCATION/TRAINING PROGRAM

## 2024-01-01 PROCEDURE — 82330 ASSAY OF CALCIUM: CPT

## 2024-01-01 PROCEDURE — 82805 BLOOD GASES W/O2 SATURATION: CPT | Performed by: STUDENT IN AN ORGANIZED HEALTH CARE EDUCATION/TRAINING PROGRAM

## 2024-01-01 PROCEDURE — 96375 TX/PRO/DX INJ NEW DRUG ADDON: CPT | Mod: 59

## 2024-01-01 PROCEDURE — A33966: Performed by: ANESTHESIOLOGY

## 2024-01-01 PROCEDURE — 74177 CT ABD & PELVIS W/CONTRAST: CPT

## 2024-01-01 PROCEDURE — C9113 INJ PANTOPRAZOLE SODIUM, VIA: HCPCS

## 2024-01-01 PROCEDURE — 81001 URINALYSIS AUTO W/SCOPE: CPT

## 2024-01-01 PROCEDURE — 84520 ASSAY OF UREA NITROGEN: CPT | Performed by: STUDENT IN AN ORGANIZED HEALTH CARE EDUCATION/TRAINING PROGRAM

## 2024-01-01 PROCEDURE — 85730 THROMBOPLASTIN TIME PARTIAL: CPT

## 2024-01-01 PROCEDURE — 2500000005 HC RX 250 GENERAL PHARMACY W/O HCPCS: Performed by: STUDENT IN AN ORGANIZED HEALTH CARE EDUCATION/TRAINING PROGRAM

## 2024-01-01 PROCEDURE — 86832 HLA CLASS I HIGH DEFIN QUAL: CPT | Performed by: NURSE PRACTITIONER

## 2024-01-01 PROCEDURE — 99233 SBSQ HOSP IP/OBS HIGH 50: CPT | Performed by: STUDENT IN AN ORGANIZED HEALTH CARE EDUCATION/TRAINING PROGRAM

## 2024-01-01 PROCEDURE — 83010 ASSAY OF HAPTOGLOBIN QUANT: CPT | Performed by: NURSE PRACTITIONER

## 2024-01-01 PROCEDURE — 80053 COMPREHEN METABOLIC PANEL: CPT | Mod: MUE | Performed by: STUDENT IN AN ORGANIZED HEALTH CARE EDUCATION/TRAINING PROGRAM

## 2024-01-01 PROCEDURE — P9045 ALBUMIN (HUMAN), 5%, 250 ML: HCPCS | Mod: JZ | Performed by: STUDENT IN AN ORGANIZED HEALTH CARE EDUCATION/TRAINING PROGRAM

## 2024-01-01 PROCEDURE — 83605 ASSAY OF LACTIC ACID: CPT | Performed by: NURSE PRACTITIONER

## 2024-01-01 PROCEDURE — 93308 TTE F-UP OR LMTD: CPT

## 2024-01-01 PROCEDURE — 93880 EXTRACRANIAL BILAT STUDY: CPT | Performed by: INTERNAL MEDICINE

## 2024-01-01 PROCEDURE — 82947 ASSAY GLUCOSE BLOOD QUANT: CPT | Performed by: NURSE PRACTITIONER

## 2024-01-01 PROCEDURE — 82435 ASSAY OF BLOOD CHLORIDE: CPT | Mod: MUE | Performed by: STUDENT IN AN ORGANIZED HEALTH CARE EDUCATION/TRAINING PROGRAM

## 2024-01-01 PROCEDURE — 85520 HEPARIN ASSAY: CPT

## 2024-01-01 PROCEDURE — 80069 RENAL FUNCTION PANEL: CPT | Mod: CCI

## 2024-01-01 PROCEDURE — 99291 CRITICAL CARE FIRST HOUR: CPT

## 2024-01-01 PROCEDURE — 86704 HEP B CORE ANTIBODY TOTAL: CPT | Performed by: STUDENT IN AN ORGANIZED HEALTH CARE EDUCATION/TRAINING PROGRAM

## 2024-01-01 PROCEDURE — RXMED WILLOW AMBULATORY MEDICATION CHARGE

## 2024-01-01 PROCEDURE — 80202 ASSAY OF VANCOMYCIN: CPT

## 2024-01-01 PROCEDURE — 97116 GAIT TRAINING THERAPY: CPT | Mod: GP

## 2024-01-01 PROCEDURE — 74176 CT ABD & PELVIS W/O CONTRAST: CPT | Performed by: RADIOLOGY

## 2024-01-01 PROCEDURE — 85007 BL SMEAR W/DIFF WBC COUNT: CPT | Performed by: NURSE PRACTITIONER

## 2024-01-01 PROCEDURE — 85027 COMPLETE CBC AUTOMATED: CPT | Performed by: NURSE PRACTITIONER

## 2024-01-01 PROCEDURE — 85027 COMPLETE CBC AUTOMATED: CPT | Mod: 91 | Performed by: STUDENT IN AN ORGANIZED HEALTH CARE EDUCATION/TRAINING PROGRAM

## 2024-01-01 PROCEDURE — 99233 SBSQ HOSP IP/OBS HIGH 50: CPT | Performed by: PHYSICIAN ASSISTANT

## 2024-01-01 PROCEDURE — 83735 ASSAY OF MAGNESIUM: CPT

## 2024-01-01 PROCEDURE — 2500000002 HC RX 250 W HCPCS SELF ADMINISTERED DRUGS (ALT 637 FOR MEDICARE OP, ALT 636 FOR OP/ED)

## 2024-01-01 PROCEDURE — 82947 ASSAY GLUCOSE BLOOD QUANT: CPT | Performed by: STUDENT IN AN ORGANIZED HEALTH CARE EDUCATION/TRAINING PROGRAM

## 2024-01-01 PROCEDURE — 2500000004 HC RX 250 GENERAL PHARMACY W/ HCPCS (ALT 636 FOR OP/ED): Mod: JZ | Performed by: STUDENT IN AN ORGANIZED HEALTH CARE EDUCATION/TRAINING PROGRAM

## 2024-01-01 PROCEDURE — 94668 MNPJ CHEST WALL SBSQ: CPT

## 2024-01-01 PROCEDURE — 83930 ASSAY OF BLOOD OSMOLALITY: CPT

## 2024-01-01 PROCEDURE — 84145 PROCALCITONIN (PCT): CPT

## 2024-01-01 PROCEDURE — 36415 COLL VENOUS BLD VENIPUNCTURE: CPT | Performed by: STUDENT IN AN ORGANIZED HEALTH CARE EDUCATION/TRAINING PROGRAM

## 2024-01-01 PROCEDURE — 80197 ASSAY OF TACROLIMUS: CPT | Performed by: STUDENT IN AN ORGANIZED HEALTH CARE EDUCATION/TRAINING PROGRAM

## 2024-01-01 PROCEDURE — 83605 ASSAY OF LACTIC ACID: CPT

## 2024-01-01 PROCEDURE — 0CJS8ZZ INSPECTION OF LARYNX, VIA NATURAL OR ARTIFICIAL OPENING ENDOSCOPIC: ICD-10-PCS | Performed by: THORACIC SURGERY (CARDIOTHORACIC VASCULAR SURGERY)

## 2024-01-01 PROCEDURE — 2720000007 HC OR 272 NO HCPCS: Performed by: INTERNAL MEDICINE

## 2024-01-01 PROCEDURE — 85045 AUTOMATED RETICULOCYTE COUNT: CPT | Mod: MUE | Performed by: NURSE PRACTITIONER

## 2024-01-01 PROCEDURE — 97110 THERAPEUTIC EXERCISES: CPT | Mod: GO

## 2024-01-01 PROCEDURE — 86778 TOXOPLASMA ANTIBODY IGM: CPT | Performed by: STUDENT IN AN ORGANIZED HEALTH CARE EDUCATION/TRAINING PROGRAM

## 2024-01-01 PROCEDURE — 83735 ASSAY OF MAGNESIUM: CPT | Performed by: STUDENT IN AN ORGANIZED HEALTH CARE EDUCATION/TRAINING PROGRAM

## 2024-01-01 PROCEDURE — 87305 ASPERGILLUS AG IA: CPT | Performed by: NURSE PRACTITIONER

## 2024-01-01 PROCEDURE — 87522 HEPATITIS C REVRS TRNSCRPJ: CPT | Performed by: STUDENT IN AN ORGANIZED HEALTH CARE EDUCATION/TRAINING PROGRAM

## 2024-01-01 PROCEDURE — 33993 REPOSG PERQ R/L HRT VAD: CPT | Performed by: PHYSICIAN ASSISTANT

## 2024-01-01 PROCEDURE — 82330 ASSAY OF CALCIUM: CPT | Mod: 91 | Performed by: STUDENT IN AN ORGANIZED HEALTH CARE EDUCATION/TRAINING PROGRAM

## 2024-01-01 PROCEDURE — 84100 ASSAY OF PHOSPHORUS: CPT | Performed by: NURSE PRACTITIONER

## 2024-01-01 PROCEDURE — P9047 ALBUMIN (HUMAN), 25%, 50ML: HCPCS | Mod: JZ | Performed by: NURSE PRACTITIONER

## 2024-01-01 PROCEDURE — 85025 COMPLETE CBC W/AUTO DIFF WBC: CPT | Performed by: STUDENT IN AN ORGANIZED HEALTH CARE EDUCATION/TRAINING PROGRAM

## 2024-01-01 PROCEDURE — 84100 ASSAY OF PHOSPHORUS: CPT | Performed by: STUDENT IN AN ORGANIZED HEALTH CARE EDUCATION/TRAINING PROGRAM

## 2024-01-01 PROCEDURE — 33966 ECMO/ECLS RMVL PRPH CANNULA: CPT | Performed by: THORACIC SURGERY (CARDIOTHORACIC VASCULAR SURGERY)

## 2024-01-01 PROCEDURE — 87449 NOS EACH ORGANISM AG IA: CPT | Performed by: NURSE PRACTITIONER

## 2024-01-01 PROCEDURE — 36430 TRANSFUSION BLD/BLD COMPNT: CPT

## 2024-01-01 PROCEDURE — C9113 INJ PANTOPRAZOLE SODIUM, VIA: HCPCS | Performed by: NURSE PRACTITIONER

## 2024-01-01 PROCEDURE — 36514 APHERESIS PLASMA: CPT | Performed by: PATHOLOGY

## 2024-01-01 PROCEDURE — C1725 CATH, TRANSLUMIN NON-LASER: HCPCS | Performed by: INTERNAL MEDICINE

## 2024-01-01 PROCEDURE — 80195 ASSAY OF SIROLIMUS: CPT | Performed by: NURSE PRACTITIONER

## 2024-01-01 PROCEDURE — 83051 HEMOGLOBIN PLASMA: CPT

## 2024-01-01 PROCEDURE — 5A1D80Z PERFORMANCE OF URINARY FILTRATION, PROLONGED INTERMITTENT, 6-18 HOURS PER DAY: ICD-10-PCS | Performed by: STUDENT IN AN ORGANIZED HEALTH CARE EDUCATION/TRAINING PROGRAM

## 2024-01-01 PROCEDURE — 82435 ASSAY OF BLOOD CHLORIDE: CPT | Mod: MUE | Performed by: NURSE PRACTITIONER

## 2024-01-01 PROCEDURE — 80307 DRUG TEST PRSMV CHEM ANLYZR: CPT | Performed by: NURSE PRACTITIONER

## 2024-01-01 PROCEDURE — C1769 GUIDE WIRE: HCPCS | Performed by: INTERNAL MEDICINE

## 2024-01-01 PROCEDURE — 84481 FREE ASSAY (FT-3): CPT

## 2024-01-01 PROCEDURE — 87081 CULTURE SCREEN ONLY: CPT

## 2024-01-01 PROCEDURE — 86765 RUBEOLA ANTIBODY: CPT | Performed by: NURSE PRACTITIONER

## 2024-01-01 PROCEDURE — 93308 TTE F-UP OR LMTD: CPT | Performed by: STUDENT IN AN ORGANIZED HEALTH CARE EDUCATION/TRAINING PROGRAM

## 2024-01-01 PROCEDURE — 99232 SBSQ HOSP IP/OBS MODERATE 35: CPT | Performed by: STUDENT IN AN ORGANIZED HEALTH CARE EDUCATION/TRAINING PROGRAM

## 2024-01-01 PROCEDURE — 85025 COMPLETE CBC W/AUTO DIFF WBC: CPT

## 2024-01-01 PROCEDURE — 82040 ASSAY OF SERUM ALBUMIN: CPT

## 2024-01-01 PROCEDURE — 88185 FLOWCYTOMETRY/TC ADD-ON: CPT | Mod: TC | Performed by: NURSE PRACTITIONER

## 2024-01-01 PROCEDURE — 85520 HEPARIN ASSAY: CPT | Mod: 91

## 2024-01-01 PROCEDURE — 86880 COOMBS TEST DIRECT: CPT

## 2024-01-01 PROCEDURE — A33966: Performed by: ANESTHESIOLOGIST ASSISTANT

## 2024-01-01 PROCEDURE — 83735 ASSAY OF MAGNESIUM: CPT | Mod: 91 | Performed by: STUDENT IN AN ORGANIZED HEALTH CARE EDUCATION/TRAINING PROGRAM

## 2024-01-01 PROCEDURE — 93970 EXTREMITY STUDY: CPT | Performed by: INTERNAL MEDICINE

## 2024-01-01 PROCEDURE — 82435 ASSAY OF BLOOD CHLORIDE: CPT

## 2024-01-01 PROCEDURE — 86825 HLA X-MATH NON-CYTOTOXIC: CPT | Mod: OUT | Performed by: INTERNAL MEDICINE

## 2024-01-01 PROCEDURE — 31500 INSERT EMERGENCY AIRWAY: CPT | Performed by: STUDENT IN AN ORGANIZED HEALTH CARE EDUCATION/TRAINING PROGRAM

## 2024-01-01 PROCEDURE — 2500000004 HC RX 250 GENERAL PHARMACY W/ HCPCS (ALT 636 FOR OP/ED): Mod: JZ | Performed by: NURSE PRACTITIONER

## 2024-01-01 PROCEDURE — 3600000005 HC OR TIME - INITIAL BASE CHARGE - PROCEDURE LEVEL FIVE: Performed by: THORACIC SURGERY (CARDIOTHORACIC VASCULAR SURGERY)

## 2024-01-01 PROCEDURE — 86645 CMV ANTIBODY IGM: CPT | Performed by: STUDENT IN AN ORGANIZED HEALTH CARE EDUCATION/TRAINING PROGRAM

## 2024-01-01 PROCEDURE — 36556 INSERT NON-TUNNEL CV CATH: CPT | Performed by: NURSE PRACTITIONER

## 2024-01-01 PROCEDURE — 85045 AUTOMATED RETICULOCYTE COUNT: CPT | Performed by: STUDENT IN AN ORGANIZED HEALTH CARE EDUCATION/TRAINING PROGRAM

## 2024-01-01 PROCEDURE — 99223 1ST HOSP IP/OBS HIGH 75: CPT | Performed by: TRANSPLANT SURGERY

## 2024-01-01 PROCEDURE — 99291 CRITICAL CARE FIRST HOUR: CPT | Performed by: EMERGENCY MEDICINE

## 2024-01-01 PROCEDURE — 3700000002 HC GENERAL ANESTHESIA TIME - EACH INCREMENTAL 1 MINUTE: Performed by: THORACIC SURGERY (CARDIOTHORACIC VASCULAR SURGERY)

## 2024-01-01 PROCEDURE — 87040 BLOOD CULTURE FOR BACTERIA: CPT

## 2024-01-01 PROCEDURE — 02HV33Z INSERTION OF INFUSION DEVICE INTO SUPERIOR VENA CAVA, PERCUTANEOUS APPROACH: ICD-10-PCS | Performed by: STUDENT IN AN ORGANIZED HEALTH CARE EDUCATION/TRAINING PROGRAM

## 2024-01-01 PROCEDURE — 85520 HEPARIN ASSAY: CPT | Performed by: NURSE PRACTITIONER

## 2024-01-01 PROCEDURE — 99153 MOD SED SAME PHYS/QHP EA: CPT | Performed by: INTERNAL MEDICINE

## 2024-01-01 PROCEDURE — 8010000001 HC DIALYSIS - HEMODIALYSIS PER DAY

## 2024-01-01 PROCEDURE — 82435 ASSAY OF BLOOD CHLORIDE: CPT | Performed by: PHYSICIAN ASSISTANT

## 2024-01-01 PROCEDURE — 85018 HEMOGLOBIN: CPT

## 2024-01-01 PROCEDURE — 93925 LOWER EXTREMITY STUDY: CPT | Performed by: INTERNAL MEDICINE

## 2024-01-01 PROCEDURE — 93925 LOWER EXTREMITY STUDY: CPT

## 2024-01-01 PROCEDURE — 86663 EPSTEIN-BARR ANTIBODY: CPT | Performed by: STUDENT IN AN ORGANIZED HEALTH CARE EDUCATION/TRAINING PROGRAM

## 2024-01-01 PROCEDURE — 86403 PARTICLE AGGLUT ANTBDY SCRN: CPT

## 2024-01-01 PROCEDURE — 83615 LACTATE (LD) (LDH) ENZYME: CPT

## 2024-01-01 PROCEDURE — 83630 LACTOFERRIN FECAL (QUAL): CPT | Performed by: NURSE PRACTITIONER

## 2024-01-01 PROCEDURE — 33949 ECMO/ECLS DAILY MGMT ARTERY: CPT | Performed by: STUDENT IN AN ORGANIZED HEALTH CARE EDUCATION/TRAINING PROGRAM

## 2024-01-01 PROCEDURE — 99232 SBSQ HOSP IP/OBS MODERATE 35: CPT | Performed by: INTERNAL MEDICINE

## 2024-01-01 PROCEDURE — 85007 BL SMEAR W/DIFF WBC COUNT: CPT

## 2024-01-01 PROCEDURE — 84439 ASSAY OF FREE THYROXINE: CPT | Performed by: STUDENT IN AN ORGANIZED HEALTH CARE EDUCATION/TRAINING PROGRAM

## 2024-01-01 PROCEDURE — 85007 BL SMEAR W/DIFF WBC COUNT: CPT | Performed by: STUDENT IN AN ORGANIZED HEALTH CARE EDUCATION/TRAINING PROGRAM

## 2024-01-01 PROCEDURE — 86644 CMV ANTIBODY: CPT | Performed by: STUDENT IN AN ORGANIZED HEALTH CARE EDUCATION/TRAINING PROGRAM

## 2024-01-01 PROCEDURE — 82784 ASSAY IGA/IGD/IGG/IGM EACH: CPT | Performed by: NURSE PRACTITIONER

## 2024-01-01 PROCEDURE — 85018 HEMOGLOBIN: CPT | Performed by: INTERNAL MEDICINE

## 2024-01-01 PROCEDURE — 93308 TTE F-UP OR LMTD: CPT | Mod: MUE

## 2024-01-01 PROCEDURE — 85045 AUTOMATED RETICULOCYTE COUNT: CPT

## 2024-01-01 PROCEDURE — 33949 ECMO/ECLS DAILY MGMT ARTERY: CPT | Performed by: ANESTHESIOLOGY

## 2024-01-01 PROCEDURE — 80069 RENAL FUNCTION PANEL: CPT

## 2024-01-01 PROCEDURE — 82330 ASSAY OF CALCIUM: CPT | Performed by: NURSE PRACTITIONER

## 2024-01-01 PROCEDURE — 84075 ASSAY ALKALINE PHOSPHATASE: CPT | Performed by: STUDENT IN AN ORGANIZED HEALTH CARE EDUCATION/TRAINING PROGRAM

## 2024-01-01 PROCEDURE — 87070 CULTURE OTHR SPECIMN AEROBIC: CPT | Performed by: INTERNAL MEDICINE

## 2024-01-01 PROCEDURE — 99222 1ST HOSP IP/OBS MODERATE 55: CPT | Performed by: STUDENT IN AN ORGANIZED HEALTH CARE EDUCATION/TRAINING PROGRAM

## 2024-01-01 PROCEDURE — 85055 RETICULATED PLATELET ASSAY: CPT | Performed by: STUDENT IN AN ORGANIZED HEALTH CARE EDUCATION/TRAINING PROGRAM

## 2024-01-01 PROCEDURE — 90945 DIALYSIS ONE EVALUATION: CPT | Performed by: INTERNAL MEDICINE

## 2024-01-01 PROCEDURE — 2500000002 HC RX 250 W HCPCS SELF ADMINISTERED DRUGS (ALT 637 FOR MEDICARE OP, ALT 636 FOR OP/ED): Mod: MUE

## 2024-01-01 PROCEDURE — C1751 CATH, INF, PER/CENT/MIDLINE: HCPCS | Performed by: INTERNAL MEDICINE

## 2024-01-01 PROCEDURE — 87389 HIV-1 AG W/HIV-1&-2 AB AG IA: CPT | Performed by: STUDENT IN AN ORGANIZED HEALTH CARE EDUCATION/TRAINING PROGRAM

## 2024-01-01 PROCEDURE — 99497 ADVNCD CARE PLAN 30 MIN: CPT

## 2024-01-01 PROCEDURE — 93750 INTERROGATION VAD IN PERSON: CPT | Performed by: EMERGENCY MEDICINE

## 2024-01-01 PROCEDURE — 5A1D70Z PERFORMANCE OF URINARY FILTRATION, INTERMITTENT, LESS THAN 6 HOURS PER DAY: ICD-10-PCS | Performed by: STUDENT IN AN ORGANIZED HEALTH CARE EDUCATION/TRAINING PROGRAM

## 2024-01-01 PROCEDURE — 36620 INSERTION CATHETER ARTERY: CPT | Performed by: STUDENT IN AN ORGANIZED HEALTH CARE EDUCATION/TRAINING PROGRAM

## 2024-01-01 PROCEDURE — 86901 BLOOD TYPING SEROLOGIC RH(D): CPT | Performed by: NURSE PRACTITIONER

## 2024-01-01 PROCEDURE — 84439 ASSAY OF FREE THYROXINE: CPT

## 2024-01-01 PROCEDURE — 80048 BASIC METABOLIC PNL TOTAL CA: CPT | Performed by: NURSE PRACTITIONER

## 2024-01-01 PROCEDURE — 86481 TB AG RESPONSE T-CELL SUSP: CPT | Performed by: NURSE PRACTITIONER

## 2024-01-01 PROCEDURE — 92610 EVALUATE SWALLOWING FUNCTION: CPT | Mod: GN | Performed by: SPEECH-LANGUAGE PATHOLOGIST

## 2024-01-01 PROCEDURE — 85379 FIBRIN DEGRADATION QUANT: CPT | Performed by: STUDENT IN AN ORGANIZED HEALTH CARE EDUCATION/TRAINING PROGRAM

## 2024-01-01 PROCEDURE — 99223 1ST HOSP IP/OBS HIGH 75: CPT | Performed by: PHYSICIAN ASSISTANT

## 2024-01-01 PROCEDURE — 33993 REPOSG PERQ R/L HRT VAD: CPT | Performed by: THORACIC SURGERY (CARDIOTHORACIC VASCULAR SURGERY)

## 2024-01-01 PROCEDURE — 82570 ASSAY OF URINE CREATININE: CPT

## 2024-01-01 PROCEDURE — 85384 FIBRINOGEN ACTIVITY: CPT | Performed by: NURSE PRACTITIONER

## 2024-01-01 PROCEDURE — 36556 INSERT NON-TUNNEL CV CATH: CPT | Mod: GC | Performed by: STUDENT IN AN ORGANIZED HEALTH CARE EDUCATION/TRAINING PROGRAM

## 2024-01-01 PROCEDURE — 31500 INSERT EMERGENCY AIRWAY: CPT | Performed by: EMERGENCY MEDICINE

## 2024-01-01 PROCEDURE — 87493 C DIFF AMPLIFIED PROBE: CPT | Performed by: NURSE PRACTITIONER

## 2024-01-01 PROCEDURE — 80076 HEPATIC FUNCTION PANEL: CPT | Mod: CCI | Performed by: STUDENT IN AN ORGANIZED HEALTH CARE EDUCATION/TRAINING PROGRAM

## 2024-01-01 PROCEDURE — 74018 RADEX ABDOMEN 1 VIEW: CPT

## 2024-01-01 PROCEDURE — 2500000004 HC RX 250 GENERAL PHARMACY W/ HCPCS (ALT 636 FOR OP/ED): Performed by: THORACIC SURGERY (CARDIOTHORACIC VASCULAR SURGERY)

## 2024-01-01 PROCEDURE — 82150 ASSAY OF AMYLASE: CPT

## 2024-01-01 PROCEDURE — 83605 ASSAY OF LACTIC ACID: CPT | Mod: MUE | Performed by: NURSE PRACTITIONER

## 2024-01-01 PROCEDURE — 2500000005 HC RX 250 GENERAL PHARMACY W/O HCPCS: Performed by: ANESTHESIOLOGIST ASSISTANT

## 2024-01-01 PROCEDURE — 93458 L HRT ARTERY/VENTRICLE ANGIO: CPT | Performed by: INTERNAL MEDICINE

## 2024-01-01 PROCEDURE — 97168 OT RE-EVAL EST PLAN CARE: CPT | Mod: GO

## 2024-01-01 PROCEDURE — 87040 BLOOD CULTURE FOR BACTERIA: CPT | Performed by: NURSE PRACTITIONER

## 2024-01-01 PROCEDURE — 87493 C DIFF AMPLIFIED PROBE: CPT

## 2024-01-01 PROCEDURE — 82550 ASSAY OF CK (CPK): CPT | Performed by: NURSE PRACTITIONER

## 2024-01-01 PROCEDURE — 4A023N6 MEASUREMENT OF CARDIAC SAMPLING AND PRESSURE, RIGHT HEART, PERCUTANEOUS APPROACH: ICD-10-PCS | Performed by: INTERNAL MEDICINE

## 2024-01-01 PROCEDURE — 0BH17EZ INSERTION OF ENDOTRACHEAL AIRWAY INTO TRACHEA, VIA NATURAL OR ARTIFICIAL OPENING: ICD-10-PCS | Performed by: EMERGENCY MEDICINE

## 2024-01-01 PROCEDURE — 74176 CT ABD & PELVIS W/O CONTRAST: CPT

## 2024-01-01 PROCEDURE — 82947 ASSAY GLUCOSE BLOOD QUANT: CPT | Mod: 91,MUE

## 2024-01-01 PROCEDURE — 94762 N-INVAS EAR/PLS OXIMTRY CONT: CPT

## 2024-01-01 PROCEDURE — 87081 CULTURE SCREEN ONLY: CPT | Performed by: STUDENT IN AN ORGANIZED HEALTH CARE EDUCATION/TRAINING PROGRAM

## 2024-01-01 PROCEDURE — 83010 ASSAY OF HAPTOGLOBIN QUANT: CPT | Performed by: STUDENT IN AN ORGANIZED HEALTH CARE EDUCATION/TRAINING PROGRAM

## 2024-01-01 PROCEDURE — 80053 COMPREHEN METABOLIC PANEL: CPT | Mod: MUE | Performed by: NURSE PRACTITIONER

## 2024-01-01 PROCEDURE — 5A1935Z RESPIRATORY VENTILATION, LESS THAN 24 CONSECUTIVE HOURS: ICD-10-PCS | Performed by: STUDENT IN AN ORGANIZED HEALTH CARE EDUCATION/TRAINING PROGRAM

## 2024-01-01 PROCEDURE — 84443 ASSAY THYROID STIM HORMONE: CPT | Performed by: NURSE PRACTITIONER

## 2024-01-01 PROCEDURE — 87635 SARS-COV-2 COVID-19 AMP PRB: CPT | Performed by: STUDENT IN AN ORGANIZED HEALTH CARE EDUCATION/TRAINING PROGRAM

## 2024-01-01 PROCEDURE — 87637 SARSCOV2&INF A&B&RSV AMP PRB: CPT

## 2024-01-01 PROCEDURE — 83880 ASSAY OF NATRIURETIC PEPTIDE: CPT

## 2024-01-01 PROCEDURE — 99221 1ST HOSP IP/OBS SF/LOW 40: CPT | Performed by: OTOLARYNGOLOGY

## 2024-01-01 PROCEDURE — 82330 ASSAY OF CALCIUM: CPT | Performed by: INTERNAL MEDICINE

## 2024-01-01 PROCEDURE — 83605 ASSAY OF LACTIC ACID: CPT | Mod: 91 | Performed by: STUDENT IN AN ORGANIZED HEALTH CARE EDUCATION/TRAINING PROGRAM

## 2024-01-01 PROCEDURE — P9037 PLATE PHERES LEUKOREDU IRRAD: HCPCS

## 2024-01-01 PROCEDURE — 34716 OPN AX/SUBCLA ART EXPOS CNDT: CPT | Performed by: THORACIC SURGERY (CARDIOTHORACIC VASCULAR SURGERY)

## 2024-01-01 PROCEDURE — 87486 CHLMYD PNEUM DNA AMP PROBE: CPT | Performed by: NURSE PRACTITIONER

## 2024-01-01 PROCEDURE — 82374 ASSAY BLOOD CARBON DIOXIDE: CPT | Performed by: NURSE PRACTITIONER

## 2024-01-01 PROCEDURE — 99233 SBSQ HOSP IP/OBS HIGH 50: CPT | Performed by: INTERNAL MEDICINE

## 2024-01-01 PROCEDURE — 85379 FIBRIN DEGRADATION QUANT: CPT

## 2024-01-01 PROCEDURE — 88307 TISSUE EXAM BY PATHOLOGIST: CPT | Performed by: PATHOLOGY

## 2024-01-01 PROCEDURE — 80053 COMPREHEN METABOLIC PANEL: CPT | Performed by: NURSE PRACTITIONER

## 2024-01-01 PROCEDURE — 99232 SBSQ HOSP IP/OBS MODERATE 35: CPT

## 2024-01-01 PROCEDURE — 84439 ASSAY OF FREE THYROXINE: CPT | Performed by: NURSE PRACTITIONER

## 2024-01-01 PROCEDURE — 83735 ASSAY OF MAGNESIUM: CPT | Mod: WESLAB | Performed by: STUDENT IN AN ORGANIZED HEALTH CARE EDUCATION/TRAINING PROGRAM

## 2024-01-01 PROCEDURE — 86850 RBC ANTIBODY SCREEN: CPT | Performed by: NURSE PRACTITIONER

## 2024-01-01 PROCEDURE — 85018 HEMOGLOBIN: CPT | Performed by: NURSE PRACTITIONER

## 2024-01-01 PROCEDURE — 86645 CMV ANTIBODY IGM: CPT | Performed by: NURSE PRACTITIONER

## 2024-01-01 PROCEDURE — 84295 ASSAY OF SERUM SODIUM: CPT | Performed by: NURSE PRACTITIONER

## 2024-01-01 PROCEDURE — 94640 AIRWAY INHALATION TREATMENT: CPT

## 2024-01-01 PROCEDURE — 93321 DOPPLER ECHO F-UP/LMTD STD: CPT | Performed by: STUDENT IN AN ORGANIZED HEALTH CARE EDUCATION/TRAINING PROGRAM

## 2024-01-01 PROCEDURE — 84100 ASSAY OF PHOSPHORUS: CPT

## 2024-01-01 PROCEDURE — 83690 ASSAY OF LIPASE: CPT

## 2024-01-01 PROCEDURE — 86735 MUMPS ANTIBODY: CPT | Performed by: NURSE PRACTITIONER

## 2024-01-01 PROCEDURE — 85305 CLOT INHIBIT PROT S TOTAL: CPT | Performed by: NURSE PRACTITIONER

## 2024-01-01 PROCEDURE — 82330 ASSAY OF CALCIUM: CPT | Mod: MUE | Performed by: STUDENT IN AN ORGANIZED HEALTH CARE EDUCATION/TRAINING PROGRAM

## 2024-01-01 PROCEDURE — 87799 DETECT AGENT NOS DNA QUANT: CPT

## 2024-01-01 PROCEDURE — P9047 ALBUMIN (HUMAN), 25%, 50ML: HCPCS | Mod: JZ

## 2024-01-01 PROCEDURE — 85027 COMPLETE CBC AUTOMATED: CPT | Mod: 91

## 2024-01-01 PROCEDURE — 33967 INSERT I-AORT PERCUT DEVICE: CPT | Performed by: INTERNAL MEDICINE

## 2024-01-01 PROCEDURE — 87632 RESP VIRUS 6-11 TARGETS: CPT | Performed by: STUDENT IN AN ORGANIZED HEALTH CARE EDUCATION/TRAINING PROGRAM

## 2024-01-01 PROCEDURE — P9017 PLASMA 1 DONOR FRZ W/IN 8 HR: HCPCS

## 2024-01-01 PROCEDURE — 99291 CRITICAL CARE FIRST HOUR: CPT | Performed by: SPECIALIST

## 2024-01-01 PROCEDURE — 36556 INSERT NON-TUNNEL CV CATH: CPT | Performed by: STUDENT IN AN ORGANIZED HEALTH CARE EDUCATION/TRAINING PROGRAM

## 2024-01-01 PROCEDURE — 93880 EXTRACRANIAL BILAT STUDY: CPT

## 2024-01-01 PROCEDURE — 97162 PT EVAL MOD COMPLEX 30 MIN: CPT | Mod: GP

## 2024-01-01 PROCEDURE — 99232 SBSQ HOSP IP/OBS MODERATE 35: CPT | Performed by: NURSE PRACTITIONER

## 2024-01-01 PROCEDURE — 99292 CRITICAL CARE ADDL 30 MIN: CPT | Performed by: STUDENT IN AN ORGANIZED HEALTH CARE EDUCATION/TRAINING PROGRAM

## 2024-01-01 PROCEDURE — 86022 PLATELET ANTIBODIES: CPT

## 2024-01-01 PROCEDURE — 93926 LOWER EXTREMITY STUDY: CPT | Performed by: INTERNAL MEDICINE

## 2024-01-01 PROCEDURE — 31500 INSERT EMERGENCY AIRWAY: CPT

## 2024-01-01 PROCEDURE — 99222 1ST HOSP IP/OBS MODERATE 55: CPT | Performed by: INTERNAL MEDICINE

## 2024-01-01 PROCEDURE — 85210 CLOT FACTOR II PROTHROM SPEC: CPT | Performed by: STUDENT IN AN ORGANIZED HEALTH CARE EDUCATION/TRAINING PROGRAM

## 2024-01-01 PROCEDURE — 84702 CHORIONIC GONADOTROPIN TEST: CPT | Performed by: NURSE PRACTITIONER

## 2024-01-01 PROCEDURE — 99222 1ST HOSP IP/OBS MODERATE 55: CPT | Performed by: SURGERY

## 2024-01-01 PROCEDURE — 85520 HEPARIN ASSAY: CPT | Performed by: STUDENT IN AN ORGANIZED HEALTH CARE EDUCATION/TRAINING PROGRAM

## 2024-01-01 PROCEDURE — 80069 RENAL FUNCTION PANEL: CPT | Mod: CCI | Performed by: STUDENT IN AN ORGANIZED HEALTH CARE EDUCATION/TRAINING PROGRAM

## 2024-01-01 PROCEDURE — 93010 ELECTROCARDIOGRAM REPORT: CPT | Performed by: INTERNAL MEDICINE

## 2024-01-01 PROCEDURE — 80162 ASSAY OF DIGOXIN TOTAL: CPT | Performed by: STUDENT IN AN ORGANIZED HEALTH CARE EDUCATION/TRAINING PROGRAM

## 2024-01-01 PROCEDURE — 86787 VARICELLA-ZOSTER ANTIBODY: CPT | Performed by: NURSE PRACTITIONER

## 2024-01-01 PROCEDURE — 99291 CRITICAL CARE FIRST HOUR: CPT | Mod: 25 | Performed by: EMERGENCY MEDICINE

## 2024-01-01 PROCEDURE — 87340 HEPATITIS B SURFACE AG IA: CPT | Performed by: STUDENT IN AN ORGANIZED HEALTH CARE EDUCATION/TRAINING PROGRAM

## 2024-01-01 PROCEDURE — 71045 X-RAY EXAM CHEST 1 VIEW: CPT | Mod: MUE

## 2024-01-01 PROCEDURE — 87205 SMEAR GRAM STAIN: CPT | Performed by: NURSE PRACTITIONER

## 2024-01-01 PROCEDURE — 36620 INSERTION CATHETER ARTERY: CPT

## 2024-01-01 PROCEDURE — 3E043XZ INTRODUCTION OF VASOPRESSOR INTO CENTRAL VEIN, PERCUTANEOUS APPROACH: ICD-10-PCS | Performed by: STUDENT IN AN ORGANIZED HEALTH CARE EDUCATION/TRAINING PROGRAM

## 2024-01-01 PROCEDURE — 2780000003 HC OR 278 NO HCPCS: Performed by: THORACIC SURGERY (CARDIOTHORACIC VASCULAR SURGERY)

## 2024-01-01 PROCEDURE — 80202 ASSAY OF VANCOMYCIN: CPT | Performed by: NURSE PRACTITIONER

## 2024-01-01 PROCEDURE — 33990 INSJ PERQ VAD L HRT ARTERIAL: CPT | Performed by: THORACIC SURGERY (CARDIOTHORACIC VASCULAR SURGERY)

## 2024-01-01 PROCEDURE — 87340 HEPATITIS B SURFACE AG IA: CPT | Performed by: NURSE PRACTITIONER

## 2024-01-01 PROCEDURE — 86787 VARICELLA-ZOSTER ANTIBODY: CPT | Performed by: STUDENT IN AN ORGANIZED HEALTH CARE EDUCATION/TRAINING PROGRAM

## 2024-01-01 PROCEDURE — 84132 ASSAY OF SERUM POTASSIUM: CPT | Performed by: NURSE PRACTITIONER

## 2024-01-01 PROCEDURE — 5A1D90Z PERFORMANCE OF URINARY FILTRATION, CONTINUOUS, GREATER THAN 18 HOURS PER DAY: ICD-10-PCS | Performed by: STUDENT IN AN ORGANIZED HEALTH CARE EDUCATION/TRAINING PROGRAM

## 2024-01-01 PROCEDURE — 82330 ASSAY OF CALCIUM: CPT | Mod: MUE

## 2024-01-01 PROCEDURE — 87205 SMEAR GRAM STAIN: CPT

## 2024-01-01 PROCEDURE — 31720 CLEARANCE OF AIRWAYS: CPT

## 2024-01-01 PROCEDURE — 86920 COMPATIBILITY TEST SPIN: CPT

## 2024-01-01 PROCEDURE — A4312 CATH W/O BAG 2-WAY SILICONE: HCPCS | Performed by: THORACIC SURGERY (CARDIOTHORACIC VASCULAR SURGERY)

## 2024-01-01 PROCEDURE — C1894 INTRO/SHEATH, NON-LASER: HCPCS | Performed by: THORACIC SURGERY (CARDIOTHORACIC VASCULAR SURGERY)

## 2024-01-01 PROCEDURE — 84300 ASSAY OF URINE SODIUM: CPT

## 2024-01-01 PROCEDURE — 3600000010 HC OR TIME - EACH INCREMENTAL 1 MINUTE - PROCEDURE LEVEL FIVE: Performed by: THORACIC SURGERY (CARDIOTHORACIC VASCULAR SURGERY)

## 2024-01-01 PROCEDURE — 84100 ASSAY OF PHOSPHORUS: CPT | Performed by: INTERNAL MEDICINE

## 2024-01-01 PROCEDURE — 88307 TISSUE EXAM BY PATHOLOGIST: CPT | Mod: TC,SUR | Performed by: INTERNAL MEDICINE

## 2024-01-01 PROCEDURE — 82565 ASSAY OF CREATININE: CPT

## 2024-01-01 PROCEDURE — 76700 US EXAM ABDOM COMPLETE: CPT | Performed by: RADIOLOGY

## 2024-01-01 PROCEDURE — 02BK3ZX EXCISION OF RIGHT VENTRICLE, PERCUTANEOUS APPROACH, DIAGNOSTIC: ICD-10-PCS | Performed by: INTERNAL MEDICINE

## 2024-01-01 PROCEDURE — 71250 CT THORAX DX C-: CPT | Performed by: RADIOLOGY

## 2024-01-01 PROCEDURE — 84443 ASSAY THYROID STIM HORMONE: CPT | Performed by: INTERNAL MEDICINE

## 2024-01-01 PROCEDURE — 96365 THER/PROPH/DIAG IV INF INIT: CPT | Mod: 59

## 2024-01-01 PROCEDURE — 87641 MR-STAPH DNA AMP PROBE: CPT | Performed by: NURSE PRACTITIONER

## 2024-01-01 PROCEDURE — 93975 VASCULAR STUDY: CPT | Performed by: INTERNAL MEDICINE

## 2024-01-01 PROCEDURE — 94002 VENT MGMT INPAT INIT DAY: CPT

## 2024-01-01 PROCEDURE — 84443 ASSAY THYROID STIM HORMONE: CPT | Performed by: STUDENT IN AN ORGANIZED HEALTH CARE EDUCATION/TRAINING PROGRAM

## 2024-01-01 PROCEDURE — 5A1945Z RESPIRATORY VENTILATION, 24-96 CONSECUTIVE HOURS: ICD-10-PCS | Performed by: STUDENT IN AN ORGANIZED HEALTH CARE EDUCATION/TRAINING PROGRAM

## 2024-01-01 PROCEDURE — 84520 ASSAY OF UREA NITROGEN: CPT | Performed by: HOSPITALIST

## 2024-01-01 PROCEDURE — 80202 ASSAY OF VANCOMYCIN: CPT | Performed by: INTERNAL MEDICINE

## 2024-01-01 PROCEDURE — 85230 CLOT FACTOR VII PROCONVERTIN: CPT | Performed by: STUDENT IN AN ORGANIZED HEALTH CARE EDUCATION/TRAINING PROGRAM

## 2024-01-01 PROCEDURE — 83605 ASSAY OF LACTIC ACID: CPT | Mod: MUE | Performed by: STUDENT IN AN ORGANIZED HEALTH CARE EDUCATION/TRAINING PROGRAM

## 2024-01-01 PROCEDURE — 99498 ADVNCD CARE PLAN ADDL 30 MIN: CPT

## 2024-01-01 PROCEDURE — 93979 VASCULAR STUDY: CPT | Performed by: INTERNAL MEDICINE

## 2024-01-01 PROCEDURE — 2580000001 HC RX 258 IV SOLUTIONS: Performed by: NURSE PRACTITIONER

## 2024-01-01 PROCEDURE — 80053 COMPREHEN METABOLIC PANEL: CPT

## 2024-01-01 PROCEDURE — 80202 ASSAY OF VANCOMYCIN: CPT | Performed by: THORACIC SURGERY (CARDIOTHORACIC VASCULAR SURGERY)

## 2024-01-01 PROCEDURE — C1889 IMPLANT/INSERT DEVICE, NOC: HCPCS | Performed by: THORACIC SURGERY (CARDIOTHORACIC VASCULAR SURGERY)

## 2024-01-01 PROCEDURE — 80076 HEPATIC FUNCTION PANEL: CPT

## 2024-01-01 PROCEDURE — 99221 1ST HOSP IP/OBS SF/LOW 40: CPT

## 2024-01-01 PROCEDURE — 76937 US GUIDE VASCULAR ACCESS: CPT

## 2024-01-01 PROCEDURE — 2550000001 HC RX 255 CONTRASTS: Performed by: INTERNAL MEDICINE

## 2024-01-01 PROCEDURE — 86901 BLOOD TYPING SEROLOGIC RH(D): CPT | Performed by: STUDENT IN AN ORGANIZED HEALTH CARE EDUCATION/TRAINING PROGRAM

## 2024-01-01 PROCEDURE — 84134 ASSAY OF PREALBUMIN: CPT | Performed by: NURSE PRACTITIONER

## 2024-01-01 PROCEDURE — 87799 DETECT AGENT NOS DNA QUANT: CPT | Performed by: NURSE PRACTITIONER

## 2024-01-01 PROCEDURE — 93979 VASCULAR STUDY: CPT

## 2024-01-01 PROCEDURE — 87305 ASPERGILLUS AG IA: CPT | Performed by: STUDENT IN AN ORGANIZED HEALTH CARE EDUCATION/TRAINING PROGRAM

## 2024-01-01 PROCEDURE — 84481 FREE ASSAY (FT-3): CPT | Performed by: NURSE PRACTITIONER

## 2024-01-01 PROCEDURE — 51702 INSERT TEMP BLADDER CATH: CPT

## 2024-01-01 PROCEDURE — 82947 ASSAY GLUCOSE BLOOD QUANT: CPT | Mod: MUE | Performed by: NURSE PRACTITIONER

## 2024-01-01 PROCEDURE — 83615 LACTATE (LD) (LDH) ENZYME: CPT | Mod: 91 | Performed by: NURSE PRACTITIONER

## 2024-01-01 PROCEDURE — 3700000001 HC GENERAL ANESTHESIA TIME - INITIAL BASE CHARGE: Performed by: THORACIC SURGERY (CARDIOTHORACIC VASCULAR SURGERY)

## 2024-01-01 PROCEDURE — 85520 HEPARIN ASSAY: CPT | Performed by: ANESTHESIOLOGY

## 2024-01-01 PROCEDURE — 86147 CARDIOLIPIN ANTIBODY EA IG: CPT | Mod: WESLAB | Performed by: NURSE PRACTITIONER

## 2024-01-01 PROCEDURE — 2780000003 HC OR 278 NO HCPCS: Performed by: INTERNAL MEDICINE

## 2024-01-01 PROCEDURE — 85730 THROMBOPLASTIN TIME PARTIAL: CPT | Performed by: STUDENT IN AN ORGANIZED HEALTH CARE EDUCATION/TRAINING PROGRAM

## 2024-01-01 PROCEDURE — 80053 COMPREHEN METABOLIC PANEL: CPT | Performed by: INTERNAL MEDICINE

## 2024-01-01 PROCEDURE — 70450 CT HEAD/BRAIN W/O DYE: CPT | Performed by: RADIOLOGY

## 2024-01-01 PROCEDURE — 80202 ASSAY OF VANCOMYCIN: CPT | Performed by: SPECIALIST

## 2024-01-01 PROCEDURE — 82435 ASSAY OF BLOOD CHLORIDE: CPT | Performed by: INTERNAL MEDICINE

## 2024-01-01 PROCEDURE — 82435 ASSAY OF BLOOD CHLORIDE: CPT | Mod: 91,MUE | Performed by: NURSE PRACTITIONER

## 2024-01-01 PROCEDURE — 93975 VASCULAR STUDY: CPT

## 2024-01-01 PROCEDURE — 82565 ASSAY OF CREATININE: CPT | Performed by: NURSE PRACTITIONER

## 2024-01-01 PROCEDURE — 87636 SARSCOV2 & INF A&B AMP PRB: CPT

## 2024-01-01 PROCEDURE — 97110 THERAPEUTIC EXERCISES: CPT | Mod: GP

## 2024-01-01 PROCEDURE — 84520 ASSAY OF UREA NITROGEN: CPT

## 2024-01-01 PROCEDURE — 87493 C DIFF AMPLIFIED PROBE: CPT | Performed by: STUDENT IN AN ORGANIZED HEALTH CARE EDUCATION/TRAINING PROGRAM

## 2024-01-01 PROCEDURE — 84295 ASSAY OF SERUM SODIUM: CPT

## 2024-01-01 PROCEDURE — 86706 HEP B SURFACE ANTIBODY: CPT | Performed by: NURSE PRACTITIONER

## 2024-01-01 PROCEDURE — 2500000002 HC RX 250 W HCPCS SELF ADMINISTERED DRUGS (ALT 637 FOR MEDICARE OP, ALT 636 FOR OP/ED): Mod: MUE | Performed by: STUDENT IN AN ORGANIZED HEALTH CARE EDUCATION/TRAINING PROGRAM

## 2024-01-01 PROCEDURE — 99231 SBSQ HOSP IP/OBS SF/LOW 25: CPT

## 2024-01-01 PROCEDURE — 81001 URINALYSIS AUTO W/SCOPE: CPT | Performed by: STUDENT IN AN ORGANIZED HEALTH CARE EDUCATION/TRAINING PROGRAM

## 2024-01-01 PROCEDURE — 99223 1ST HOSP IP/OBS HIGH 75: CPT | Performed by: REGISTERED NURSE

## 2024-01-01 PROCEDURE — 86140 C-REACTIVE PROTEIN: CPT

## 2024-01-01 PROCEDURE — 36591 DRAW BLOOD OFF VENOUS DEVICE: CPT | Performed by: NURSE PRACTITIONER

## 2024-01-01 PROCEDURE — 74176 CT ABD & PELVIS W/O CONTRAST: CPT | Performed by: STUDENT IN AN ORGANIZED HEALTH CARE EDUCATION/TRAINING PROGRAM

## 2024-01-01 PROCEDURE — 0BH17EZ INSERTION OF ENDOTRACHEAL AIRWAY INTO TRACHEA, VIA NATURAL OR ARTIFICIAL OPENING: ICD-10-PCS | Performed by: ANESTHESIOLOGY

## 2024-01-01 PROCEDURE — 82375 ASSAY CARBOXYHB QUANT: CPT

## 2024-01-01 PROCEDURE — 31624 DX BRONCHOSCOPE/LAVAGE: CPT

## 2024-01-01 PROCEDURE — 85060 BLOOD SMEAR INTERPRETATION: CPT | Performed by: PATHOLOGY

## 2024-01-01 PROCEDURE — 83690 ASSAY OF LIPASE: CPT | Performed by: STUDENT IN AN ORGANIZED HEALTH CARE EDUCATION/TRAINING PROGRAM

## 2024-01-01 PROCEDURE — 83010 ASSAY OF HAPTOGLOBIN QUANT: CPT | Mod: WESLAB,MUE

## 2024-01-01 PROCEDURE — 33990 INSJ PERQ VAD L HRT ARTERIAL: CPT | Performed by: PHYSICIAN ASSISTANT

## 2024-01-01 PROCEDURE — 76705 ECHO EXAM OF ABDOMEN: CPT | Performed by: RADIOLOGY

## 2024-01-01 PROCEDURE — 84155 ASSAY OF PROTEIN SERUM: CPT | Performed by: NURSE PRACTITIONER

## 2024-01-01 PROCEDURE — 86777 TOXOPLASMA ANTIBODY: CPT | Performed by: STUDENT IN AN ORGANIZED HEALTH CARE EDUCATION/TRAINING PROGRAM

## 2024-01-01 PROCEDURE — 82550 ASSAY OF CK (CPK): CPT | Performed by: STUDENT IN AN ORGANIZED HEALTH CARE EDUCATION/TRAINING PROGRAM

## 2024-01-01 PROCEDURE — 85730 THROMBOPLASTIN TIME PARTIAL: CPT | Performed by: NURSE PRACTITIONER

## 2024-01-01 PROCEDURE — 82040 ASSAY OF SERUM ALBUMIN: CPT | Performed by: NURSE PRACTITIONER

## 2024-01-01 PROCEDURE — 87081 CULTURE SCREEN ONLY: CPT | Performed by: NURSE PRACTITIONER

## 2024-01-01 PROCEDURE — 83605 ASSAY OF LACTIC ACID: CPT | Mod: MUE

## 2024-01-01 PROCEDURE — HNCIP HEMOC NO CHARGE INPATIENT HOSPITAL: Performed by: STUDENT IN AN ORGANIZED HEALTH CARE EDUCATION/TRAINING PROGRAM

## 2024-01-01 PROCEDURE — 83010 ASSAY OF HAPTOGLOBIN QUANT: CPT | Mod: WESLAB | Performed by: NURSE PRACTITIONER

## 2024-01-01 PROCEDURE — 83036 HEMOGLOBIN GLYCOSYLATED A1C: CPT | Performed by: NURSE PRACTITIONER

## 2024-01-01 PROCEDURE — 82330 ASSAY OF CALCIUM: CPT | Mod: 91

## 2024-01-01 PROCEDURE — 82435 ASSAY OF BLOOD CHLORIDE: CPT | Mod: WESLAB | Performed by: NURSE PRACTITIONER

## 2024-01-01 PROCEDURE — 84132 ASSAY OF SERUM POTASSIUM: CPT | Performed by: STUDENT IN AN ORGANIZED HEALTH CARE EDUCATION/TRAINING PROGRAM

## 2024-01-01 PROCEDURE — 85302 CLOT INHIBIT PROT C ANTIGEN: CPT | Performed by: NURSE PRACTITIONER

## 2024-01-01 PROCEDURE — 5A1522G EXTRACORPOREAL OXYGENATION, MEMBRANE, PERIPHERAL VENO-ARTERIAL: ICD-10-PCS | Performed by: THORACIC SURGERY (CARDIOTHORACIC VASCULAR SURGERY)

## 2024-01-01 PROCEDURE — 86317 IMMUNOASSAY INFECTIOUS AGENT: CPT | Performed by: NURSE PRACTITIONER

## 2024-01-01 PROCEDURE — 71260 CT THORAX DX C+: CPT | Performed by: RADIOLOGY

## 2024-01-01 PROCEDURE — 4A023N7 MEASUREMENT OF CARDIAC SAMPLING AND PRESSURE, LEFT HEART, PERCUTANEOUS APPROACH: ICD-10-PCS | Performed by: INTERNAL MEDICINE

## 2024-01-01 PROCEDURE — 86850 RBC ANTIBODY SCREEN: CPT

## 2024-01-01 PROCEDURE — 93922 UPR/L XTREMITY ART 2 LEVELS: CPT | Performed by: INTERNAL MEDICINE

## 2024-01-01 PROCEDURE — 86778 TOXOPLASMA ANTIBODY IGM: CPT | Performed by: NURSE PRACTITIONER

## 2024-01-01 PROCEDURE — 93451 RIGHT HEART CATH: CPT | Mod: 73 | Performed by: INTERNAL MEDICINE

## 2024-01-01 PROCEDURE — 36514 APHERESIS PLASMA: CPT

## 2024-01-01 PROCEDURE — 87449 NOS EACH ORGANISM AG IA: CPT

## 2024-01-01 PROCEDURE — 82040 ASSAY OF SERUM ALBUMIN: CPT | Performed by: STUDENT IN AN ORGANIZED HEALTH CARE EDUCATION/TRAINING PROGRAM

## 2024-01-01 PROCEDURE — 82728 ASSAY OF FERRITIN: CPT

## 2024-01-01 PROCEDURE — 85018 HEMOGLOBIN: CPT | Mod: 91 | Performed by: STUDENT IN AN ORGANIZED HEALTH CARE EDUCATION/TRAINING PROGRAM

## 2024-01-01 PROCEDURE — 93971 EXTREMITY STUDY: CPT | Performed by: INTERNAL MEDICINE

## 2024-01-01 PROCEDURE — 2500000004 HC RX 250 GENERAL PHARMACY W/ HCPCS (ALT 636 FOR OP/ED): Performed by: HOSPITALIST

## 2024-01-01 PROCEDURE — 86704 HEP B CORE ANTIBODY TOTAL: CPT | Performed by: NURSE PRACTITIONER

## 2024-01-01 PROCEDURE — 85379 FIBRIN DEGRADATION QUANT: CPT | Mod: 91 | Performed by: NURSE PRACTITIONER

## 2024-01-01 PROCEDURE — 82805 BLOOD GASES W/O2 SATURATION: CPT | Performed by: INTERNAL MEDICINE

## 2024-01-01 PROCEDURE — 84075 ASSAY ALKALINE PHOSPHATASE: CPT

## 2024-01-01 PROCEDURE — 83993 ASSAY FOR CALPROTECTIN FECAL: CPT | Performed by: NURSE PRACTITIONER

## 2024-01-01 PROCEDURE — B2111ZZ FLUOROSCOPY OF MULTIPLE CORONARY ARTERIES USING LOW OSMOLAR CONTRAST: ICD-10-PCS | Performed by: INTERNAL MEDICINE

## 2024-01-01 PROCEDURE — 80053 COMPREHEN METABOLIC PANEL: CPT | Mod: MUE,91

## 2024-01-01 PROCEDURE — 99223 1ST HOSP IP/OBS HIGH 75: CPT

## 2024-01-01 PROCEDURE — 05HY33Z INSERTION OF INFUSION DEVICE INTO UPPER VEIN, PERCUTANEOUS APPROACH: ICD-10-PCS | Performed by: STUDENT IN AN ORGANIZED HEALTH CARE EDUCATION/TRAINING PROGRAM

## 2024-01-01 PROCEDURE — 86022 PLATELET ANTIBODIES: CPT | Performed by: STUDENT IN AN ORGANIZED HEALTH CARE EDUCATION/TRAINING PROGRAM

## 2024-01-01 PROCEDURE — 71250 CT THORAX DX C-: CPT | Performed by: STUDENT IN AN ORGANIZED HEALTH CARE EDUCATION/TRAINING PROGRAM

## 2024-01-01 PROCEDURE — 99231 SBSQ HOSP IP/OBS SF/LOW 25: CPT | Performed by: STUDENT IN AN ORGANIZED HEALTH CARE EDUCATION/TRAINING PROGRAM

## 2024-01-01 PROCEDURE — 85018 HEMOGLOBIN: CPT | Performed by: STUDENT IN AN ORGANIZED HEALTH CARE EDUCATION/TRAINING PROGRAM

## 2024-01-01 PROCEDURE — 99231 SBSQ HOSP IP/OBS SF/LOW 25: CPT | Performed by: NURSE PRACTITIONER

## 2024-01-01 PROCEDURE — 3E0436Z INTRODUCTION OF NUTRITIONAL SUBSTANCE INTO CENTRAL VEIN, PERCUTANEOUS APPROACH: ICD-10-PCS | Performed by: THORACIC SURGERY (CARDIOTHORACIC VASCULAR SURGERY)

## 2024-01-01 PROCEDURE — 82330 ASSAY OF CALCIUM: CPT | Mod: 91,MUE | Performed by: STUDENT IN AN ORGANIZED HEALTH CARE EDUCATION/TRAINING PROGRAM

## 2024-01-01 PROCEDURE — 86803 HEPATITIS C AB TEST: CPT | Performed by: STUDENT IN AN ORGANIZED HEALTH CARE EDUCATION/TRAINING PROGRAM

## 2024-01-01 PROCEDURE — 93325 DOPPLER ECHO COLOR FLOW MAPG: CPT | Performed by: STUDENT IN AN ORGANIZED HEALTH CARE EDUCATION/TRAINING PROGRAM

## 2024-01-01 PROCEDURE — 36620 INSERTION CATHETER ARTERY: CPT | Performed by: NURSE PRACTITIONER

## 2024-01-01 PROCEDURE — P9073 PLATELETS PHERESIS PATH REDU: HCPCS

## 2024-01-01 PROCEDURE — 86777 TOXOPLASMA ANTIBODY: CPT | Performed by: NURSE PRACTITIONER

## 2024-01-01 PROCEDURE — 82805 BLOOD GASES W/O2 SATURATION: CPT | Mod: MUE

## 2024-01-01 PROCEDURE — 93926 LOWER EXTREMITY STUDY: CPT

## 2024-01-01 PROCEDURE — 80202 ASSAY OF VANCOMYCIN: CPT | Performed by: STUDENT IN AN ORGANIZED HEALTH CARE EDUCATION/TRAINING PROGRAM

## 2024-01-01 PROCEDURE — 99232 SBSQ HOSP IP/OBS MODERATE 35: CPT | Performed by: SURGERY

## 2024-01-01 PROCEDURE — 85347 COAGULATION TIME ACTIVATED: CPT | Performed by: INTERNAL MEDICINE

## 2024-01-01 PROCEDURE — 71250 CT THORAX DX C-: CPT

## 2024-01-01 PROCEDURE — 82248 BILIRUBIN DIRECT: CPT | Mod: 91 | Performed by: NURSE PRACTITIONER

## 2024-01-01 PROCEDURE — 02HA3RZ INSERTION OF SHORT-TERM EXTERNAL HEART ASSIST SYSTEM INTO HEART, PERCUTANEOUS APPROACH: ICD-10-PCS | Performed by: THORACIC SURGERY (CARDIOTHORACIC VASCULAR SURGERY)

## 2024-01-01 PROCEDURE — 99232 SBSQ HOSP IP/OBS MODERATE 35: CPT | Performed by: OTOLARYNGOLOGY

## 2024-01-01 PROCEDURE — 5A1522G EXTRACORPOREAL OXYGENATION, MEMBRANE, PERIPHERAL VENO-ARTERIAL: ICD-10-PCS | Performed by: STUDENT IN AN ORGANIZED HEALTH CARE EDUCATION/TRAINING PROGRAM

## 2024-01-01 PROCEDURE — 94010 BREATHING CAPACITY TEST: CPT

## 2024-01-01 PROCEDURE — 84132 ASSAY OF SERUM POTASSIUM: CPT

## 2024-01-01 PROCEDURE — 82140 ASSAY OF AMMONIA: CPT

## 2024-01-01 PROCEDURE — 76937 US GUIDE VASCULAR ACCESS: CPT | Performed by: STUDENT IN AN ORGANIZED HEALTH CARE EDUCATION/TRAINING PROGRAM

## 2024-01-01 PROCEDURE — 97535 SELF CARE MNGMENT TRAINING: CPT | Mod: GO

## 2024-01-01 PROCEDURE — 99223 1ST HOSP IP/OBS HIGH 75: CPT | Performed by: SURGERY

## 2024-01-01 PROCEDURE — 87801 DETECT AGNT MULT DNA AMPLI: CPT | Performed by: NURSE PRACTITIONER

## 2024-01-01 PROCEDURE — 97166 OT EVAL MOD COMPLEX 45 MIN: CPT | Mod: GO

## 2024-01-01 PROCEDURE — 86901 BLOOD TYPING SEROLOGIC RH(D): CPT | Mod: OUT

## 2024-01-01 PROCEDURE — 33952 ECMO/ECLS INSJ PRPH CANNULA: CPT

## 2024-01-01 PROCEDURE — 85260 CLOT FACTOR X STUART-POWER: CPT | Performed by: STUDENT IN AN ORGANIZED HEALTH CARE EDUCATION/TRAINING PROGRAM

## 2024-01-01 PROCEDURE — 86826 HLA X-MATCH NONCYTOTOXC ADDL: CPT | Mod: OUT,91 | Performed by: INTERNAL MEDICINE

## 2024-01-01 PROCEDURE — 85240 CLOT FACTOR VIII AHG 1 STAGE: CPT | Performed by: STUDENT IN AN ORGANIZED HEALTH CARE EDUCATION/TRAINING PROGRAM

## 2024-01-01 PROCEDURE — 0B9D8ZX DRAINAGE OF RIGHT MIDDLE LUNG LOBE, VIA NATURAL OR ARTIFICIAL OPENING ENDOSCOPIC, DIAGNOSTIC: ICD-10-PCS | Performed by: EMERGENCY MEDICINE

## 2024-01-01 PROCEDURE — 99292 CRITICAL CARE ADDL 30 MIN: CPT | Performed by: INTERNAL MEDICINE

## 2024-01-01 PROCEDURE — 76700 US EXAM ABDOM COMPLETE: CPT

## 2024-01-01 PROCEDURE — 3600000004 HC OR TIME - INITIAL BASE CHARGE - PROCEDURE LEVEL FOUR: Performed by: THORACIC SURGERY (CARDIOTHORACIC VASCULAR SURGERY)

## 2024-01-01 PROCEDURE — 76770 US EXAM ABDO BACK WALL COMP: CPT | Performed by: RADIOLOGY

## 2024-01-01 PROCEDURE — 84436 ASSAY OF TOTAL THYROXINE: CPT | Performed by: NURSE PRACTITIONER

## 2024-01-01 PROCEDURE — 86803 HEPATITIS C AB TEST: CPT | Performed by: NURSE PRACTITIONER

## 2024-01-01 PROCEDURE — 87040 BLOOD CULTURE FOR BACTERIA: CPT | Performed by: STUDENT IN AN ORGANIZED HEALTH CARE EDUCATION/TRAINING PROGRAM

## 2024-01-01 PROCEDURE — 86780 TREPONEMA PALLIDUM: CPT | Performed by: NURSE PRACTITIONER

## 2024-01-01 PROCEDURE — 93970 EXTREMITY STUDY: CPT | Performed by: RADIOLOGY

## 2024-01-01 PROCEDURE — 86825 HLA X-MATH NON-CYTOTOXIC: CPT | Mod: OUT,MUE | Performed by: INTERNAL MEDICINE

## 2024-01-01 PROCEDURE — 5A0221D ASSISTANCE WITH CARDIAC OUTPUT USING IMPELLER PUMP, CONTINUOUS: ICD-10-PCS | Performed by: THORACIC SURGERY (CARDIOTHORACIC VASCULAR SURGERY)

## 2024-01-01 PROCEDURE — 93010 ELECTROCARDIOGRAM REPORT: CPT | Performed by: EMERGENCY MEDICINE

## 2024-01-01 PROCEDURE — 93975 VASCULAR STUDY: CPT | Performed by: RADIOLOGY

## 2024-01-01 PROCEDURE — 82330 ASSAY OF CALCIUM: CPT | Performed by: HOSPITALIST

## 2024-01-01 PROCEDURE — 86644 CMV ANTIBODY: CPT | Performed by: NURSE PRACTITIONER

## 2024-01-01 PROCEDURE — 2500000004 HC RX 250 GENERAL PHARMACY W/ HCPCS (ALT 636 FOR OP/ED): Performed by: ANESTHESIOLOGIST ASSISTANT

## 2024-01-01 PROCEDURE — 33968 REMOVE AORTIC ASSIST DEVICE: CPT | Performed by: THORACIC SURGERY (CARDIOTHORACIC VASCULAR SURGERY)

## 2024-01-01 PROCEDURE — 82565 ASSAY OF CREATININE: CPT | Performed by: STUDENT IN AN ORGANIZED HEALTH CARE EDUCATION/TRAINING PROGRAM

## 2024-01-01 PROCEDURE — 85397 CLOTTING FUNCT ACTIVITY: CPT | Performed by: STUDENT IN AN ORGANIZED HEALTH CARE EDUCATION/TRAINING PROGRAM

## 2024-01-01 PROCEDURE — 83036 HEMOGLOBIN GLYCOSYLATED A1C: CPT

## 2024-01-01 PROCEDURE — 84156 ASSAY OF PROTEIN URINE: CPT

## 2024-01-01 PROCEDURE — 2500000004 HC RX 250 GENERAL PHARMACY W/ HCPCS (ALT 636 FOR OP/ED): Performed by: PHYSICIAN ASSISTANT

## 2024-01-01 PROCEDURE — 2500000005 HC RX 250 GENERAL PHARMACY W/O HCPCS: Performed by: INTERNAL MEDICINE

## 2024-01-01 PROCEDURE — 84540 ASSAY OF URINE/UREA-N: CPT

## 2024-01-01 PROCEDURE — 86920 COMPATIBILITY TEST SPIN: CPT | Mod: 91

## 2024-01-01 PROCEDURE — 3E033XZ INTRODUCTION OF VASOPRESSOR INTO PERIPHERAL VEIN, PERCUTANEOUS APPROACH: ICD-10-PCS | Performed by: EMERGENCY MEDICINE

## 2024-01-01 PROCEDURE — 83735 ASSAY OF MAGNESIUM: CPT | Mod: 91 | Performed by: NURSE PRACTITIONER

## 2024-01-01 PROCEDURE — 36430 TRANSFUSION BLD/BLD COMPNT: CPT | Mod: GC

## 2024-01-01 PROCEDURE — 87389 HIV-1 AG W/HIV-1&-2 AB AG IA: CPT | Performed by: NURSE PRACTITIONER

## 2024-01-01 PROCEDURE — 86022 PLATELET ANTIBODIES: CPT | Performed by: NURSE PRACTITIONER

## 2024-01-01 PROCEDURE — 82435 ASSAY OF BLOOD CHLORIDE: CPT | Mod: 91 | Performed by: STUDENT IN AN ORGANIZED HEALTH CARE EDUCATION/TRAINING PROGRAM

## 2024-01-01 PROCEDURE — 99140 ANES COMP EMERGENCY COND: CPT | Performed by: ANESTHESIOLOGY

## 2024-01-01 PROCEDURE — 84484 ASSAY OF TROPONIN QUANT: CPT

## 2024-01-01 PROCEDURE — 84075 ASSAY ALKALINE PHOSPHATASE: CPT | Performed by: NURSE PRACTITIONER

## 2024-01-01 PROCEDURE — 80047 BASIC METABLC PNL IONIZED CA: CPT | Performed by: INTERNAL MEDICINE

## 2024-01-01 PROCEDURE — 33947 ECMO/ECLS INITIATION ARTERY: CPT

## 2024-01-01 PROCEDURE — 84480 ASSAY TRIIODOTHYRONINE (T3): CPT | Performed by: NURSE PRACTITIONER

## 2024-01-01 PROCEDURE — 97164 PT RE-EVAL EST PLAN CARE: CPT | Mod: GP

## 2024-01-01 PROCEDURE — 87632 RESP VIRUS 6-11 TARGETS: CPT

## 2024-01-01 PROCEDURE — 36415 COLL VENOUS BLD VENIPUNCTURE: CPT | Performed by: NURSE PRACTITIONER

## 2024-01-01 PROCEDURE — 93503 INSERT/PLACE HEART CATHETER: CPT

## 2024-01-01 PROCEDURE — 37799 UNLISTED PX VASCULAR SURGERY: CPT | Performed by: HOSPITALIST

## 2024-01-01 PROCEDURE — 31575 DIAGNOSTIC LARYNGOSCOPY: CPT

## 2024-01-01 PROCEDURE — C1768 GRAFT, VASCULAR: HCPCS | Performed by: THORACIC SURGERY (CARDIOTHORACIC VASCULAR SURGERY)

## 2024-01-01 PROCEDURE — 86695 HERPES SIMPLEX TYPE 1 TEST: CPT | Performed by: NURSE PRACTITIONER

## 2024-01-01 PROCEDURE — 6A551Z3 PHERESIS OF PLASMA, MULTIPLE: ICD-10-PCS | Performed by: PATHOLOGY

## 2024-01-01 PROCEDURE — 82805 BLOOD GASES W/O2 SATURATION: CPT | Performed by: HOSPITALIST

## 2024-01-01 PROCEDURE — 74177 CT ABD & PELVIS W/CONTRAST: CPT | Performed by: RADIOLOGY

## 2024-01-01 PROCEDURE — A4649 SURGICAL SUPPLIES: HCPCS | Performed by: THORACIC SURGERY (CARDIOTHORACIC VASCULAR SURGERY)

## 2024-01-01 PROCEDURE — 85379 FIBRIN DEGRADATION QUANT: CPT | Performed by: NURSE PRACTITIONER

## 2024-01-01 PROCEDURE — 86832 HLA CLASS I HIGH DEFIN QUAL: CPT | Performed by: REGISTERED NURSE

## 2024-01-01 PROCEDURE — 83935 ASSAY OF URINE OSMOLALITY: CPT

## 2024-01-01 PROCEDURE — 30243S1 TRANSFUSION OF NONAUTOLOGOUS GLOBULIN INTO CENTRAL VEIN, PERCUTANEOUS APPROACH: ICD-10-PCS | Performed by: STUDENT IN AN ORGANIZED HEALTH CARE EDUCATION/TRAINING PROGRAM

## 2024-01-01 PROCEDURE — 3600000009 HC OR TIME - EACH INCREMENTAL 1 MINUTE - PROCEDURE LEVEL FOUR: Performed by: THORACIC SURGERY (CARDIOTHORACIC VASCULAR SURGERY)

## 2024-01-01 PROCEDURE — 82947 ASSAY GLUCOSE BLOOD QUANT: CPT | Mod: 91,MUE | Performed by: NURSE PRACTITIONER

## 2024-01-01 PROCEDURE — 86832 HLA CLASS I HIGH DEFIN QUAL: CPT | Mod: OUT | Performed by: INTERNAL MEDICINE

## 2024-01-01 PROCEDURE — 5A1955Z RESPIRATORY VENTILATION, GREATER THAN 96 CONSECUTIVE HOURS: ICD-10-PCS | Performed by: THORACIC SURGERY (CARDIOTHORACIC VASCULAR SURGERY)

## 2024-01-01 PROCEDURE — 84132 ASSAY OF SERUM POTASSIUM: CPT | Mod: 91 | Performed by: NURSE PRACTITIONER

## 2024-01-01 PROCEDURE — 82565 ASSAY OF CREATININE: CPT | Performed by: HOSPITALIST

## 2024-01-01 PROCEDURE — A33990 PR INSJ PERQ VAD W/IMAGING ARTERY ACCESS ONLY: Performed by: ANESTHESIOLOGY

## 2024-01-01 PROCEDURE — 36556 INSERT NON-TUNNEL CV CATH: CPT | Mod: ZK | Performed by: NURSE PRACTITIONER

## 2024-01-01 PROCEDURE — 83540 ASSAY OF IRON: CPT

## 2024-01-01 PROCEDURE — C1760 CLOSURE DEV, VASC: HCPCS | Performed by: THORACIC SURGERY (CARDIOTHORACIC VASCULAR SURGERY)

## 2024-01-01 PROCEDURE — 85610 PROTHROMBIN TIME: CPT | Mod: 91 | Performed by: NURSE PRACTITIONER

## 2024-01-01 PROCEDURE — 5A02210 ASSISTANCE WITH CARDIAC OUTPUT USING BALLOON PUMP, CONTINUOUS: ICD-10-PCS | Performed by: INTERNAL MEDICINE

## 2024-01-01 PROCEDURE — X2HL0F9 INSERTION OF CONDUIT TO SHORT-TERM EXTERNAL HEART ASSIST SYSTEM INTO RIGHT AXILLARY ARTERY, OPEN APPROACH, NEW TECHNOLOGY GROUP 9: ICD-10-PCS | Performed by: THORACIC SURGERY (CARDIOTHORACIC VASCULAR SURGERY)

## 2024-01-01 PROCEDURE — 36556 INSERT NON-TUNNEL CV CATH: CPT

## 2024-01-01 PROCEDURE — 87522 HEPATITIS C REVRS TRNSCRPJ: CPT | Performed by: NURSE PRACTITIONER

## 2024-01-01 PROCEDURE — 31622 DX BRONCHOSCOPE/WASH: CPT | Performed by: EMERGENCY MEDICINE

## 2024-01-01 PROCEDURE — 80061 LIPID PANEL: CPT

## 2024-01-01 PROCEDURE — 80051 ELECTROLYTE PANEL: CPT | Performed by: STUDENT IN AN ORGANIZED HEALTH CARE EDUCATION/TRAINING PROGRAM

## 2024-01-01 PROCEDURE — 85652 RBC SED RATE AUTOMATED: CPT

## 2024-01-01 PROCEDURE — 86900 BLOOD TYPING SEROLOGIC ABO: CPT

## 2024-01-01 PROCEDURE — 2550000001 HC RX 255 CONTRASTS: Performed by: THORACIC SURGERY (CARDIOTHORACIC VASCULAR SURGERY)

## 2024-01-01 PROCEDURE — 84295 ASSAY OF SERUM SODIUM: CPT | Performed by: STUDENT IN AN ORGANIZED HEALTH CARE EDUCATION/TRAINING PROGRAM

## 2024-01-01 PROCEDURE — 87070 CULTURE OTHR SPECIMN AEROBIC: CPT | Mod: 91 | Performed by: NURSE PRACTITIONER

## 2024-01-01 PROCEDURE — 82607 VITAMIN B-12: CPT

## 2024-01-01 DEVICE — DEVICE, IMPELLA 5.5 S2, W/SMARTASSIST SET: Type: IMPLANTABLE DEVICE | Site: HEART | Status: FUNCTIONAL

## 2024-01-01 DEVICE — IMPLANTABLE DEVICE: Type: IMPLANTABLE DEVICE | Site: AXILLA | Status: FUNCTIONAL

## 2024-01-01 RX ORDER — DEXTROSE 50 % IN WATER (D50W) INTRAVENOUS SYRINGE
25
Status: DISCONTINUED | OUTPATIENT
Start: 2024-01-01 | End: 2024-01-01

## 2024-01-01 RX ORDER — HYDROMORPHONE HYDROCHLORIDE 1 MG/ML
0.4 INJECTION, SOLUTION INTRAMUSCULAR; INTRAVENOUS; SUBCUTANEOUS ONCE
Status: COMPLETED | OUTPATIENT
Start: 2024-01-01 | End: 2024-01-01

## 2024-01-01 RX ORDER — CEFAZOLIN SODIUM 2 G/100ML
2 INJECTION, SOLUTION INTRAVENOUS ONCE
Status: COMPLETED | OUTPATIENT
Start: 2024-01-01 | End: 2024-01-01

## 2024-01-01 RX ORDER — CALCIUM GLUCONATE 20 MG/ML
5512 INJECTION, SOLUTION INTRAVENOUS ONCE
Status: COMPLETED | OUTPATIENT
Start: 2024-01-01 | End: 2024-01-01

## 2024-01-01 RX ORDER — HEPARIN SODIUM 1000 [USP'U]/ML
1000 INJECTION, SOLUTION INTRAVENOUS; SUBCUTANEOUS ONCE
Status: COMPLETED | OUTPATIENT
Start: 2024-01-01 | End: 2024-01-01

## 2024-01-01 RX ORDER — PANTOPRAZOLE SODIUM 40 MG/1
40 TABLET, DELAYED RELEASE ORAL
Status: DISCONTINUED | OUTPATIENT
Start: 2024-01-01 | End: 2024-01-01

## 2024-01-01 RX ORDER — LIDOCAINE HYDROCHLORIDE 10 MG/ML
INJECTION, SOLUTION INFILTRATION; PERINEURAL
Status: DISPENSED
Start: 2024-01-01 | End: 2024-01-01

## 2024-01-01 RX ORDER — LEVOTHYROXINE SODIUM 200 UG/1
200 TABLET ORAL
Status: DISCONTINUED | OUTPATIENT
Start: 2024-01-01 | End: 2024-01-01

## 2024-01-01 RX ORDER — CALCIUM GLUCONATE 20 MG/ML
1 INJECTION, SOLUTION INTRAVENOUS EVERY 6 HOURS PRN
Status: DISCONTINUED | OUTPATIENT
Start: 2024-01-01 | End: 2024-01-01 | Stop reason: HOSPADM

## 2024-01-01 RX ORDER — CEPHALEXIN 250 MG/5ML
500 POWDER, FOR SUSPENSION ORAL EVERY 12 HOURS SCHEDULED
Status: DISCONTINUED | OUTPATIENT
Start: 2024-01-01 | End: 2024-01-01

## 2024-01-01 RX ORDER — VANCOMYCIN HYDROCHLORIDE 1 G/200ML
1000 INJECTION, SOLUTION INTRAVENOUS EVERY 24 HOURS
Status: DISCONTINUED | OUTPATIENT
Start: 2024-01-01 | End: 2024-01-01

## 2024-01-01 RX ORDER — CALCIUM CARBONATE 200(500)MG
1500 TABLET,CHEWABLE ORAL CONTINUOUS PRN
Status: DISCONTINUED | OUTPATIENT
Start: 2024-01-01 | End: 2024-01-01

## 2024-01-01 RX ORDER — ALBUMIN HUMAN 50 G/1000ML
SOLUTION INTRAVENOUS
Status: COMPLETED
Start: 2024-01-01 | End: 2024-01-01

## 2024-01-01 RX ORDER — MAGNESIUM SULFATE 1 G/100ML
1 INJECTION INTRAVENOUS ONCE
Status: COMPLETED | OUTPATIENT
Start: 2024-01-01 | End: 2024-01-01

## 2024-01-01 RX ORDER — CEPHALEXIN 500 MG/1
500 CAPSULE ORAL EVERY 8 HOURS
Status: DISCONTINUED | OUTPATIENT
Start: 2024-01-01 | End: 2024-01-01

## 2024-01-01 RX ORDER — POTASSIUM CHLORIDE 1.5 G/1.58G
20 POWDER, FOR SOLUTION ORAL EVERY 6 HOURS PRN
Status: DISCONTINUED | OUTPATIENT
Start: 2024-01-01 | End: 2024-01-01

## 2024-01-01 RX ORDER — ACETAMINOPHEN 325 MG/1
650 TABLET ORAL ONCE
Status: COMPLETED | OUTPATIENT
Start: 2024-01-01 | End: 2024-01-01

## 2024-01-01 RX ORDER — POTASSIUM CHLORIDE 29.8 MG/ML
40 INJECTION INTRAVENOUS EVERY 6 HOURS PRN
Status: DISCONTINUED | OUTPATIENT
Start: 2024-01-01 | End: 2024-01-01

## 2024-01-01 RX ORDER — OXYMETAZOLINE HCL 0.05 %
2 SPRAY, NON-AEROSOL (ML) NASAL EVERY 12 HOURS
Status: DISCONTINUED | OUTPATIENT
Start: 2024-01-01 | End: 2024-01-01

## 2024-01-01 RX ORDER — OXYCODONE HYDROCHLORIDE 5 MG/1
5 TABLET ORAL EVERY 4 HOURS PRN
Status: DISCONTINUED | OUTPATIENT
Start: 2024-01-01 | End: 2024-01-01

## 2024-01-01 RX ORDER — ESOMEPRAZOLE MAGNESIUM 40 MG/1
40 GRANULE, DELAYED RELEASE ORAL 2 TIMES DAILY
Status: DISCONTINUED | OUTPATIENT
Start: 2024-01-01 | End: 2024-01-01

## 2024-01-01 RX ORDER — HEPARIN SODIUM 1000 [USP'U]/ML
1000 INJECTION, SOLUTION INTRAVENOUS; SUBCUTANEOUS ONCE
Status: DISCONTINUED | OUTPATIENT
Start: 2024-01-01 | End: 2024-01-01

## 2024-01-01 RX ORDER — TRAZODONE HYDROCHLORIDE 50 MG/1
25 TABLET ORAL NIGHTLY PRN
Status: DISCONTINUED | OUTPATIENT
Start: 2024-01-01 | End: 2024-01-01

## 2024-01-01 RX ORDER — HYDROMORPHONE HYDROCHLORIDE 1 MG/ML
0.2 INJECTION, SOLUTION INTRAMUSCULAR; INTRAVENOUS; SUBCUTANEOUS
Status: DISCONTINUED | OUTPATIENT
Start: 2024-01-01 | End: 2024-01-01

## 2024-01-01 RX ORDER — OXYCODONE HCL 5 MG/5 ML
10 SOLUTION, ORAL ORAL EVERY 6 HOURS PRN
Status: DISCONTINUED | OUTPATIENT
Start: 2024-01-01 | End: 2024-01-01

## 2024-01-01 RX ORDER — AMOXICILLIN 250 MG
1 CAPSULE ORAL 2 TIMES DAILY
Status: DISCONTINUED | OUTPATIENT
Start: 2024-01-01 | End: 2024-01-01

## 2024-01-01 RX ORDER — NEOSTIGMINE METHYLSULFATE 1 MG/ML
INJECTION INTRAVENOUS
Status: DISPENSED
Start: 2024-01-01 | End: 2024-01-01

## 2024-01-01 RX ORDER — LEVOTHYROXINE SODIUM ANHYDROUS 100 UG/5ML
75 INJECTION, POWDER, LYOPHILIZED, FOR SOLUTION INTRAVENOUS
Status: DISCONTINUED | OUTPATIENT
Start: 2024-01-01 | End: 2024-01-01

## 2024-01-01 RX ORDER — MIDAZOLAM HYDROCHLORIDE 1 MG/ML
INJECTION INTRAMUSCULAR; INTRAVENOUS AS NEEDED
Status: DISCONTINUED | OUTPATIENT
Start: 2024-01-01 | End: 2024-01-01

## 2024-01-01 RX ORDER — DIGOXIN 125 MCG
125 TABLET ORAL DAILY
Status: DISCONTINUED | OUTPATIENT
Start: 2024-01-01 | End: 2024-01-01

## 2024-01-01 RX ORDER — VANCOMYCIN HYDROCHLORIDE 1 G/20ML
INJECTION, POWDER, LYOPHILIZED, FOR SOLUTION INTRAVENOUS DAILY PRN
Status: DISCONTINUED | OUTPATIENT
Start: 2024-01-01 | End: 2024-01-01

## 2024-01-01 RX ORDER — SIROLIMUS 1 MG/1
1 TABLET, FILM COATED ORAL DAILY
Status: DISCONTINUED | OUTPATIENT
Start: 2024-01-01 | End: 2024-01-01

## 2024-01-01 RX ORDER — AMOXICILLIN 250 MG
2 CAPSULE ORAL DAILY
Status: DISCONTINUED | OUTPATIENT
Start: 2024-01-01 | End: 2024-01-01

## 2024-01-01 RX ORDER — DIPHENHYDRAMINE HYDROCHLORIDE 50 MG/ML
25 INJECTION INTRAMUSCULAR; INTRAVENOUS EVERY 5 MIN PRN
Status: DISCONTINUED | OUTPATIENT
Start: 2024-01-01 | End: 2024-01-01 | Stop reason: HOSPADM

## 2024-01-01 RX ORDER — PROPOFOL 10 MG/ML
5-50 INJECTION, EMULSION INTRAVENOUS CONTINUOUS
Status: DISCONTINUED | OUTPATIENT
Start: 2024-01-01 | End: 2024-01-01

## 2024-01-01 RX ORDER — NEOSTIGMINE METHYLSULFATE 1 MG/ML
5 INJECTION INTRAVENOUS ONCE
Status: DISCONTINUED | OUTPATIENT
Start: 2024-01-01 | End: 2024-01-01

## 2024-01-01 RX ORDER — LEVOTHYROXINE SODIUM 50 UG/1
50 TABLET ORAL ONCE
Status: COMPLETED | OUTPATIENT
Start: 2024-01-01 | End: 2024-01-01

## 2024-01-01 RX ORDER — ACETAMINOPHEN 500 MG
10 TABLET ORAL DAILY
Status: DISCONTINUED | OUTPATIENT
Start: 2024-01-01 | End: 2024-01-01 | Stop reason: HOSPADM

## 2024-01-01 RX ORDER — PROPOFOL 10 MG/ML
5-50 INJECTION, EMULSION INTRAVENOUS CONTINUOUS
Status: DISCONTINUED | OUTPATIENT
Start: 2024-01-01 | End: 2024-01-01 | Stop reason: HOSPADM

## 2024-01-01 RX ORDER — CALCIUM CARBONATE 200(500)MG
1500 TABLET,CHEWABLE ORAL EVERY 5 MIN PRN
Status: CANCELLED | OUTPATIENT
Start: 2024-01-01

## 2024-01-01 RX ORDER — ACETAMINOPHEN 325 MG/1
975 TABLET ORAL EVERY 8 HOURS
Status: DISCONTINUED | OUTPATIENT
Start: 2024-01-01 | End: 2024-01-01

## 2024-01-01 RX ORDER — DIPHENHYDRAMINE HCL 25 MG
25 CAPSULE ORAL ONCE
Status: COMPLETED | OUTPATIENT
Start: 2024-01-01 | End: 2024-01-01

## 2024-01-01 RX ORDER — SIROLIMUS 1 MG/ML
1 SOLUTION ORAL DAILY
Status: DISCONTINUED | OUTPATIENT
Start: 2024-01-01 | End: 2024-01-01

## 2024-01-01 RX ORDER — SODIUM,POTASSIUM PHOSPHATES 280-250MG
1 POWDER IN PACKET (EA) ORAL EVERY 8 HOURS
Status: DISCONTINUED | OUTPATIENT
Start: 2024-01-01 | End: 2024-01-01

## 2024-01-01 RX ORDER — FUROSEMIDE 10 MG/ML
80 INJECTION INTRAMUSCULAR; INTRAVENOUS ONCE
Status: COMPLETED | OUTPATIENT
Start: 2024-01-01 | End: 2024-01-01

## 2024-01-01 RX ORDER — FERROUS SULFATE 300 MG/5ML
60 LIQUID (ML) ORAL DAILY
Status: DISCONTINUED | OUTPATIENT
Start: 2024-01-01 | End: 2024-01-01

## 2024-01-01 RX ORDER — MAGNESIUM SULFATE HEPTAHYDRATE 40 MG/ML
4 INJECTION, SOLUTION INTRAVENOUS EVERY 6 HOURS PRN
Status: DISCONTINUED | OUTPATIENT
Start: 2024-01-01 | End: 2024-01-01 | Stop reason: HOSPADM

## 2024-01-01 RX ORDER — OXYCODONE HYDROCHLORIDE 5 MG/1
5 TABLET ORAL EVERY 6 HOURS PRN
Status: DISCONTINUED | OUTPATIENT
Start: 2024-01-01 | End: 2024-01-01

## 2024-01-01 RX ORDER — VANCOMYCIN HYDROCHLORIDE 1 G/200ML
1000 INJECTION, SOLUTION INTRAVENOUS EVERY 12 HOURS
Status: DISCONTINUED | OUTPATIENT
Start: 2024-01-01 | End: 2024-01-01 | Stop reason: DRUGHIGH

## 2024-01-01 RX ORDER — HEPARIN SODIUM 1000 [USP'U]/ML
INJECTION, SOLUTION INTRAVENOUS; SUBCUTANEOUS
Status: COMPLETED
Start: 2024-01-01 | End: 2024-01-01

## 2024-01-01 RX ORDER — CALCIUM GLUCONATE 20 MG/ML
5914 INJECTION, SOLUTION INTRAVENOUS ONCE
Status: COMPLETED | OUTPATIENT
Start: 2024-01-01 | End: 2024-01-01

## 2024-01-01 RX ORDER — MIDAZOLAM HCL IN 0.9 % NACL/PF 2 MG/2 ML
SYRINGE (ML) INTRAVENOUS AS NEEDED
Status: DISCONTINUED | OUTPATIENT
Start: 2024-01-01 | End: 2024-01-01 | Stop reason: HOSPADM

## 2024-01-01 RX ORDER — TACROLIMUS 1 MG/1
3 CAPSULE ORAL
Status: DISCONTINUED | OUTPATIENT
Start: 2024-01-01 | End: 2024-01-01

## 2024-01-01 RX ORDER — PHENYLEPHRINE HCL IN 0.9% NACL 0.4MG/10ML
SYRINGE (ML) INTRAVENOUS
Status: DISPENSED
Start: 2024-01-01 | End: 2024-01-01

## 2024-01-01 RX ORDER — HYDRALAZINE HYDROCHLORIDE 20 MG/ML
5 INJECTION INTRAMUSCULAR; INTRAVENOUS ONCE
Status: COMPLETED | OUTPATIENT
Start: 2024-01-01 | End: 2024-01-01

## 2024-01-01 RX ORDER — FENTANYL CITRATE 50 UG/ML
INJECTION, SOLUTION INTRAMUSCULAR; INTRAVENOUS AS NEEDED
Status: DISCONTINUED | OUTPATIENT
Start: 2024-01-01 | End: 2024-01-01

## 2024-01-01 RX ORDER — VALGANCICLOVIR 450 MG/1
450 TABLET, FILM COATED ORAL
Status: DISCONTINUED | OUTPATIENT
Start: 2024-01-01 | End: 2024-01-01 | Stop reason: HOSPADM

## 2024-01-01 RX ORDER — PROPOFOL 10 MG/ML
0-50 INJECTION, EMULSION INTRAVENOUS CONTINUOUS
Status: DISCONTINUED | OUTPATIENT
Start: 2024-01-01 | End: 2024-01-01

## 2024-01-01 RX ORDER — TACROLIMUS 0.5 MG/1
0.5 CAPSULE ORAL ONCE
Status: COMPLETED | OUTPATIENT
Start: 2024-01-01 | End: 2024-01-01

## 2024-01-01 RX ORDER — HYDROMORPHONE HYDROCHLORIDE 1 MG/ML
0.2 INJECTION, SOLUTION INTRAMUSCULAR; INTRAVENOUS; SUBCUTANEOUS EVERY 4 HOURS PRN
Status: DISCONTINUED | OUTPATIENT
Start: 2024-01-01 | End: 2024-01-01

## 2024-01-01 RX ORDER — ALBUMIN HUMAN 50 G/1000ML
12.5 SOLUTION INTRAVENOUS ONCE
Status: COMPLETED | OUTPATIENT
Start: 2024-01-01 | End: 2024-01-01

## 2024-01-01 RX ORDER — HEPARIN SODIUM 1000 [USP'U]/ML
INJECTION, SOLUTION INTRAVENOUS; SUBCUTANEOUS
Status: DISPENSED
Start: 2024-01-01 | End: 2024-01-01

## 2024-01-01 RX ORDER — MIDAZOLAM HYDROCHLORIDE 1 MG/ML
1 INJECTION INTRAMUSCULAR; INTRAVENOUS ONCE
Status: COMPLETED | OUTPATIENT
Start: 2024-01-01 | End: 2024-01-01

## 2024-01-01 RX ORDER — EPINEPHRINE HCL IN DEXTROSE 5% 4MG/250ML
0-2 PLASTIC BAG, INJECTION (ML) INTRAVENOUS CONTINUOUS
Status: DISCONTINUED | OUTPATIENT
Start: 2024-01-01 | End: 2024-01-01

## 2024-01-01 RX ORDER — POLYETHYLENE GLYCOL 3350 17 G/17G
17 POWDER, FOR SOLUTION ORAL DAILY
Status: DISCONTINUED | OUTPATIENT
Start: 2024-01-01 | End: 2024-01-01 | Stop reason: HOSPADM

## 2024-01-01 RX ORDER — ROCURONIUM BROMIDE 10 MG/ML
INJECTION, SOLUTION INTRAVENOUS
Status: DISPENSED
Start: 2024-01-01 | End: 2024-01-01

## 2024-01-01 RX ORDER — DIPHENHYDRAMINE HCL 25 MG
25 CAPSULE ORAL ONCE
Status: DISCONTINUED | OUTPATIENT
Start: 2024-01-01 | End: 2024-01-01

## 2024-01-01 RX ORDER — LIDOCAINE 560 MG/1
1 PATCH PERCUTANEOUS; TOPICAL; TRANSDERMAL DAILY
Status: DISCONTINUED | OUTPATIENT
Start: 2024-01-01 | End: 2024-01-01

## 2024-01-01 RX ORDER — LEVOTHYROXINE SODIUM ANHYDROUS 100 UG/5ML
90 INJECTION, POWDER, LYOPHILIZED, FOR SOLUTION INTRAVENOUS
Status: DISCONTINUED | OUTPATIENT
Start: 2024-01-01 | End: 2024-01-01 | Stop reason: HOSPADM

## 2024-01-01 RX ORDER — NOREPINEPHRINE BITARTRATE/D5W 8 MG/250ML
.01-1 PLASTIC BAG, INJECTION (ML) INTRAVENOUS CONTINUOUS
Status: DISCONTINUED | OUTPATIENT
Start: 2024-01-01 | End: 2024-01-01

## 2024-01-01 RX ORDER — VANCOMYCIN HYDROCHLORIDE 1 G/200ML
1000 INJECTION, SOLUTION INTRAVENOUS EVERY 12 HOURS
Status: DISCONTINUED | OUTPATIENT
Start: 2024-01-01 | End: 2024-01-01

## 2024-01-01 RX ORDER — OXYMETAZOLINE HCL 0.05 %
2 SPRAY, NON-AEROSOL (ML) NASAL 3 TIMES DAILY
Status: DISCONTINUED | OUTPATIENT
Start: 2024-01-01 | End: 2024-01-01

## 2024-01-01 RX ORDER — HEPARIN SODIUM 10000 [USP'U]/100ML
0-4000 INJECTION, SOLUTION INTRAVENOUS CONTINUOUS
Status: DISCONTINUED | OUTPATIENT
Start: 2024-01-01 | End: 2024-01-01

## 2024-01-01 RX ORDER — VANCOMYCIN HYDROCHLORIDE 750 MG/150ML
750 INJECTION, SOLUTION INTRAVENOUS EVERY 12 HOURS
Status: DISCONTINUED | OUTPATIENT
Start: 2024-01-01 | End: 2024-01-01

## 2024-01-01 RX ORDER — ROSUVASTATIN CALCIUM 40 MG/1
40 TABLET, COATED ORAL NIGHTLY
Status: DISCONTINUED | OUTPATIENT
Start: 2024-01-01 | End: 2024-01-01

## 2024-01-01 RX ORDER — ETOMIDATE 2 MG/ML
INJECTION INTRAVENOUS AS NEEDED
Status: DISCONTINUED | OUTPATIENT
Start: 2024-01-01 | End: 2024-01-01

## 2024-01-01 RX ORDER — NYSTATIN 100000 [USP'U]/ML
4 SUSPENSION ORAL 4 TIMES DAILY
Status: DISCONTINUED | OUTPATIENT
Start: 2024-01-01 | End: 2024-01-01

## 2024-01-01 RX ORDER — POTASSIUM CHLORIDE 14.9 MG/ML
20 INJECTION INTRAVENOUS ONCE
Status: COMPLETED | OUTPATIENT
Start: 2024-01-01 | End: 2024-01-01

## 2024-01-01 RX ORDER — SODIUM,POTASSIUM PHOSPHATES 280-250MG
1 POWDER IN PACKET (EA) ORAL 4 TIMES DAILY
Status: COMPLETED | OUTPATIENT
Start: 2024-01-01 | End: 2024-01-01

## 2024-01-01 RX ORDER — MAGNESIUM SULFATE HEPTAHYDRATE 40 MG/ML
2 INJECTION, SOLUTION INTRAVENOUS ONCE
Status: COMPLETED | OUTPATIENT
Start: 2024-01-01 | End: 2024-01-01

## 2024-01-01 RX ORDER — SODIUM,POTASSIUM PHOSPHATES 280-250MG
1 POWDER IN PACKET (EA) ORAL 4 TIMES DAILY
Status: DISPENSED | OUTPATIENT
Start: 2024-01-01 | End: 2024-01-01

## 2024-01-01 RX ORDER — POLYETHYLENE GLYCOL 3350 17 G/17G
17 POWDER, FOR SOLUTION ORAL 2 TIMES DAILY
Status: DISCONTINUED | OUTPATIENT
Start: 2024-01-01 | End: 2024-01-01

## 2024-01-01 RX ORDER — OXYMETAZOLINE HCL 0.05 %
2 SPRAY, NON-AEROSOL (ML) NASAL EVERY 12 HOURS PRN
Status: DISCONTINUED | OUTPATIENT
Start: 2024-01-01 | End: 2024-01-01

## 2024-01-01 RX ORDER — ETOMIDATE 2 MG/ML
INJECTION INTRAVENOUS
Status: COMPLETED
Start: 2024-01-01 | End: 2024-01-01

## 2024-01-01 RX ORDER — CEFAZOLIN 1 G/1
INJECTION, POWDER, FOR SOLUTION INTRAVENOUS AS NEEDED
Status: DISCONTINUED | OUTPATIENT
Start: 2024-01-01 | End: 2024-01-01

## 2024-01-01 RX ORDER — ALBUMIN HUMAN 50 G/1000ML
62.5 SOLUTION INTRAVENOUS ONCE
Status: COMPLETED | OUTPATIENT
Start: 2024-01-01 | End: 2024-01-01

## 2024-01-01 RX ORDER — TACROLIMUS 1 MG/1
1 CAPSULE ORAL EVERY 24 HOURS
Status: DISCONTINUED | OUTPATIENT
Start: 2024-01-01 | End: 2024-01-01

## 2024-01-01 RX ORDER — DIGOXIN 125 MCG
62.5 TABLET ORAL DAILY
Status: DISCONTINUED | OUTPATIENT
Start: 2024-01-01 | End: 2024-01-01

## 2024-01-01 RX ORDER — HYDROXYZINE HYDROCHLORIDE 25 MG/1
25 TABLET, FILM COATED ORAL ONCE
Status: COMPLETED | OUTPATIENT
Start: 2024-01-01 | End: 2024-01-01

## 2024-01-01 RX ORDER — OXYCODONE HCL 5 MG/5 ML
10 SOLUTION, ORAL ORAL EVERY 4 HOURS PRN
Status: DISCONTINUED | OUTPATIENT
Start: 2024-01-01 | End: 2024-01-01

## 2024-01-01 RX ORDER — HYDROMORPHONE HYDROCHLORIDE 1 MG/ML
0.4 INJECTION, SOLUTION INTRAMUSCULAR; INTRAVENOUS; SUBCUTANEOUS
Status: DISCONTINUED | OUTPATIENT
Start: 2024-01-01 | End: 2024-01-01

## 2024-01-01 RX ORDER — ACETAMINOPHEN 325 MG/1
650 TABLET ORAL ONCE
Status: CANCELLED | OUTPATIENT
Start: 2024-01-01

## 2024-01-01 RX ORDER — DEXMEDETOMIDINE HYDROCHLORIDE 4 UG/ML
.2-1.5 INJECTION, SOLUTION INTRAVENOUS CONTINUOUS
Status: DISCONTINUED | OUTPATIENT
Start: 2024-01-01 | End: 2024-01-01

## 2024-01-01 RX ORDER — QUETIAPINE FUMARATE 25 MG/1
50 TABLET, FILM COATED ORAL 2 TIMES DAILY
Status: DISCONTINUED | OUTPATIENT
Start: 2024-01-01 | End: 2024-01-01

## 2024-01-01 RX ORDER — ETOMIDATE 2 MG/ML
20 INJECTION INTRAVENOUS ONCE
Status: COMPLETED | OUTPATIENT
Start: 2024-01-01 | End: 2024-01-01

## 2024-01-01 RX ORDER — HEPARIN SODIUM 1000 [USP'U]/ML
1000 INJECTION, SOLUTION INTRAVENOUS; SUBCUTANEOUS ONCE
Status: CANCELLED | OUTPATIENT
Start: 2024-01-01

## 2024-01-01 RX ORDER — CYCLOBENZAPRINE HCL 5 MG
5 TABLET ORAL 2 TIMES DAILY
Status: DISCONTINUED | OUTPATIENT
Start: 2024-01-01 | End: 2024-01-01

## 2024-01-01 RX ORDER — ISOSORBIDE DINITRATE 10 MG/1
10 TABLET ORAL EVERY 8 HOURS
Status: DISCONTINUED | OUTPATIENT
Start: 2024-01-01 | End: 2024-01-01

## 2024-01-01 RX ORDER — TACROLIMUS 1 MG/1
2 CAPSULE ORAL
Status: DISCONTINUED | OUTPATIENT
Start: 2024-01-01 | End: 2024-01-01

## 2024-01-01 RX ORDER — POTASSIUM CHLORIDE 1.5 G/1.58G
20 POWDER, FOR SOLUTION ORAL ONCE
Status: COMPLETED | OUTPATIENT
Start: 2024-01-01 | End: 2024-01-01

## 2024-01-01 RX ORDER — GLYCOPYRROLATE 0.2 MG/ML
INJECTION INTRAMUSCULAR; INTRAVENOUS
Status: DISPENSED
Start: 2024-01-01 | End: 2024-01-01

## 2024-01-01 RX ORDER — ONDANSETRON HYDROCHLORIDE 2 MG/ML
4 INJECTION, SOLUTION INTRAVENOUS ONCE
Status: COMPLETED | OUTPATIENT
Start: 2024-01-01 | End: 2024-01-01

## 2024-01-01 RX ORDER — ISOSORBIDE DINITRATE 20 MG/1
20 TABLET ORAL EVERY 8 HOURS
Status: DISCONTINUED | OUTPATIENT
Start: 2024-01-01 | End: 2024-01-01

## 2024-01-01 RX ORDER — HYDROMORPHONE HYDROCHLORIDE 1 MG/ML
0.2 INJECTION, SOLUTION INTRAMUSCULAR; INTRAVENOUS; SUBCUTANEOUS
Status: DISPENSED | OUTPATIENT
Start: 2024-01-01 | End: 2024-01-01

## 2024-01-01 RX ORDER — PROPOFOL 10 MG/ML
INJECTION, EMULSION INTRAVENOUS AS NEEDED
Status: DISCONTINUED | OUTPATIENT
Start: 2024-01-01 | End: 2024-01-01

## 2024-01-01 RX ORDER — LIDOCAINE HYDROCHLORIDE 20 MG/ML
INJECTION, SOLUTION INFILTRATION; PERINEURAL AS NEEDED
Status: DISCONTINUED | OUTPATIENT
Start: 2024-01-01 | End: 2024-01-01

## 2024-01-01 RX ORDER — HEPARIN SODIUM 1000 [USP'U]/ML
INJECTION, SOLUTION INTRAVENOUS; SUBCUTANEOUS AS NEEDED
Status: DISCONTINUED | OUTPATIENT
Start: 2024-01-01 | End: 2024-01-01 | Stop reason: HOSPADM

## 2024-01-01 RX ORDER — DIGOXIN 125 MCG
62.5 TABLET ORAL EVERY OTHER DAY
Status: DISCONTINUED | OUTPATIENT
Start: 2024-01-01 | End: 2024-01-01

## 2024-01-01 RX ORDER — MIDAZOLAM HYDROCHLORIDE 1 MG/ML
2 INJECTION INTRAMUSCULAR; INTRAVENOUS ONCE
Status: COMPLETED | OUTPATIENT
Start: 2024-01-01 | End: 2024-01-01

## 2024-01-01 RX ORDER — NALOXONE HYDROCHLORIDE 0.4 MG/ML
0.2 INJECTION, SOLUTION INTRAMUSCULAR; INTRAVENOUS; SUBCUTANEOUS EVERY 5 MIN PRN
Status: DISCONTINUED | OUTPATIENT
Start: 2024-01-01 | End: 2024-01-01

## 2024-01-01 RX ORDER — ALBUMIN HUMAN 250 G/1000ML
25 SOLUTION INTRAVENOUS ONCE
Status: COMPLETED | OUTPATIENT
Start: 2024-01-01 | End: 2024-01-01

## 2024-01-01 RX ORDER — MILRINONE LACTATE 0.2 MG/ML
0-.75 INJECTION, SOLUTION INTRAVENOUS CONTINUOUS
Status: DISCONTINUED | OUTPATIENT
Start: 2024-01-01 | End: 2024-01-01

## 2024-01-01 RX ORDER — POLYETHYLENE GLYCOL 3350 17 G/17G
17 POWDER, FOR SOLUTION ORAL DAILY
Status: CANCELLED | OUTPATIENT
Start: 2024-01-01

## 2024-01-01 RX ORDER — MIDAZOLAM HYDROCHLORIDE 1 MG/ML
INJECTION, SOLUTION INTRAMUSCULAR; INTRAVENOUS AS NEEDED
Status: DISCONTINUED | OUTPATIENT
Start: 2024-01-01 | End: 2024-01-01 | Stop reason: HOSPADM

## 2024-01-01 RX ORDER — TRAZODONE HYDROCHLORIDE 50 MG/1
50 TABLET ORAL NIGHTLY
Status: DISCONTINUED | OUTPATIENT
Start: 2024-01-01 | End: 2024-01-01

## 2024-01-01 RX ORDER — PREDNISONE 10 MG/1
10 TABLET ORAL DAILY
Status: DISCONTINUED | OUTPATIENT
Start: 2024-01-01 | End: 2024-01-01 | Stop reason: HOSPADM

## 2024-01-01 RX ORDER — PROPOFOL 10 MG/ML
INJECTION, EMULSION INTRAVENOUS CONTINUOUS PRN
Status: DISCONTINUED | OUTPATIENT
Start: 2024-01-01 | End: 2024-01-01

## 2024-01-01 RX ORDER — BISACODYL 10 MG/1
10 SUPPOSITORY RECTAL DAILY PRN
Status: DISCONTINUED | OUTPATIENT
Start: 2024-01-01 | End: 2024-01-01

## 2024-01-01 RX ORDER — CALCIUM GLUCONATE 20 MG/ML
5941 INJECTION, SOLUTION INTRAVENOUS ONCE
Status: COMPLETED | OUTPATIENT
Start: 2024-01-01 | End: 2024-01-01

## 2024-01-01 RX ORDER — MIDAZOLAM HYDROCHLORIDE 1 MG/ML
INJECTION INTRAMUSCULAR; INTRAVENOUS AS NEEDED
Status: DISCONTINUED | OUTPATIENT
Start: 2024-01-01 | End: 2024-01-01 | Stop reason: HOSPADM

## 2024-01-01 RX ORDER — LANOLIN ALCOHOL/MO/W.PET/CERES
500 CREAM (GRAM) TOPICAL DAILY
Status: DISCONTINUED | OUTPATIENT
Start: 2024-01-01 | End: 2024-01-01 | Stop reason: HOSPADM

## 2024-01-01 RX ORDER — LIDOCAINE 560 MG/1
1 PATCH PERCUTANEOUS; TOPICAL; TRANSDERMAL DAILY
Status: DISPENSED | OUTPATIENT
Start: 2024-01-01 | End: 2024-01-01

## 2024-01-01 RX ORDER — INDOMETHACIN 25 MG/1
CAPSULE ORAL AS NEEDED
Status: DISCONTINUED | OUTPATIENT
Start: 2024-01-01 | End: 2024-01-01 | Stop reason: HOSPADM

## 2024-01-01 RX ORDER — CALCIUM CARBONATE 200(500)MG
1500 TABLET,CHEWABLE ORAL EVERY 5 MIN PRN
Status: COMPLETED | OUTPATIENT
Start: 2024-01-01 | End: 2024-01-01

## 2024-01-01 RX ORDER — METHOCARBAMOL 500 MG/1
500 TABLET, FILM COATED ORAL EVERY 6 HOURS SCHEDULED
Status: DISCONTINUED | OUTPATIENT
Start: 2024-01-01 | End: 2024-01-01

## 2024-01-01 RX ORDER — FOLIC ACID 1 MG/1
1 TABLET ORAL DAILY
Status: DISCONTINUED | OUTPATIENT
Start: 2024-01-01 | End: 2024-01-01

## 2024-01-01 RX ORDER — OXYMETAZOLINE HCL 0.05 %
2 SPRAY, NON-AEROSOL (ML) NASAL EVERY 12 HOURS
Status: DISPENSED | OUTPATIENT
Start: 2024-01-01 | End: 2024-01-01

## 2024-01-01 RX ORDER — LACTULOSE 10 G/15ML
20 SOLUTION ORAL DAILY PRN
Status: DISCONTINUED | OUTPATIENT
Start: 2024-01-01 | End: 2024-01-01

## 2024-01-01 RX ORDER — POLYETHYLENE GLYCOL 3350 17 G/17G
17 POWDER, FOR SOLUTION ORAL DAILY PRN
Status: DISCONTINUED | OUTPATIENT
Start: 2024-01-01 | End: 2024-01-01

## 2024-01-01 RX ORDER — FAMOTIDINE 20 MG/1
20 TABLET, FILM COATED ORAL DAILY
Status: DISCONTINUED | OUTPATIENT
Start: 2024-01-01 | End: 2024-01-01

## 2024-01-01 RX ORDER — VANCOMYCIN HYDROCHLORIDE 1 G/200ML
1000 INJECTION, SOLUTION INTRAVENOUS ONCE
Status: COMPLETED | OUTPATIENT
Start: 2024-01-01 | End: 2024-01-01

## 2024-01-01 RX ORDER — ACETAMINOPHEN 325 MG/1
975 TABLET ORAL EVERY 8 HOURS PRN
Status: DISCONTINUED | OUTPATIENT
Start: 2024-01-01 | End: 2024-01-01

## 2024-01-01 RX ORDER — LEVOTHYROXINE SODIUM 200 UG/1
200 TABLET ORAL DAILY
Status: DISCONTINUED | OUTPATIENT
Start: 2024-01-01 | End: 2024-01-01

## 2024-01-01 RX ORDER — MIDAZOLAM HYDROCHLORIDE 1 MG/ML
0.5 INJECTION INTRAMUSCULAR; INTRAVENOUS ONCE
Status: COMPLETED | OUTPATIENT
Start: 2024-01-01 | End: 2024-01-01

## 2024-01-01 RX ORDER — MYCOPHENOLIC ACID 180 MG/1
180 TABLET, DELAYED RELEASE ORAL 2 TIMES DAILY
Status: DISCONTINUED | OUTPATIENT
Start: 2024-01-01 | End: 2024-01-01

## 2024-01-01 RX ORDER — FUROSEMIDE 10 MG/ML
40 INJECTION INTRAMUSCULAR; INTRAVENOUS ONCE
Status: COMPLETED | OUTPATIENT
Start: 2024-01-01 | End: 2024-01-01

## 2024-01-01 RX ORDER — FUROSEMIDE 10 MG/ML
60 INJECTION INTRAMUSCULAR; INTRAVENOUS ONCE
Status: COMPLETED | OUTPATIENT
Start: 2024-01-01 | End: 2024-01-01

## 2024-01-01 RX ORDER — INSULIN LISPRO 100 [IU]/ML
0-5 INJECTION, SOLUTION INTRAVENOUS; SUBCUTANEOUS
Status: DISCONTINUED | OUTPATIENT
Start: 2024-01-01 | End: 2024-01-01

## 2024-01-01 RX ORDER — HYDROMORPHONE HYDROCHLORIDE 1 MG/ML
INJECTION, SOLUTION INTRAMUSCULAR; INTRAVENOUS; SUBCUTANEOUS
Status: COMPLETED
Start: 2024-01-01 | End: 2024-01-01

## 2024-01-01 RX ORDER — MIDAZOLAM HYDROCHLORIDE 1 MG/ML
INJECTION, SOLUTION INTRAMUSCULAR; INTRAVENOUS AS NEEDED
Status: DISCONTINUED | OUTPATIENT
Start: 2024-01-01 | End: 2024-01-01

## 2024-01-01 RX ORDER — DIGOXIN 125 MCG
125 TABLET ORAL ONCE
Status: COMPLETED | OUTPATIENT
Start: 2024-01-01 | End: 2024-01-01

## 2024-01-01 RX ORDER — TACROLIMUS 1 MG/1
3 CAPSULE ORAL ONCE
Status: COMPLETED | OUTPATIENT
Start: 2024-01-01 | End: 2024-01-01

## 2024-01-01 RX ORDER — ISOSORBIDE DINITRATE 20 MG/1
40 TABLET ORAL EVERY 8 HOURS
Status: DISCONTINUED | OUTPATIENT
Start: 2024-01-01 | End: 2024-01-01

## 2024-01-01 RX ORDER — CALCIUM GLUCONATE 20 MG/ML
1 INJECTION, SOLUTION INTRAVENOUS EVERY 6 HOURS PRN
Status: DISCONTINUED | OUTPATIENT
Start: 2024-01-01 | End: 2024-01-01

## 2024-01-01 RX ORDER — ACETAMINOPHEN 500 MG
5 TABLET ORAL NIGHTLY
Status: DISCONTINUED | OUTPATIENT
Start: 2024-01-01 | End: 2024-01-01

## 2024-01-01 RX ORDER — FUROSEMIDE 10 MG/ML
80 INJECTION INTRAMUSCULAR; INTRAVENOUS EVERY 12 HOURS
Status: DISCONTINUED | OUTPATIENT
Start: 2024-01-01 | End: 2024-01-01

## 2024-01-01 RX ORDER — CALCIUM CARBONATE 200(500)MG
1500 TABLET,CHEWABLE ORAL EVERY 5 MIN PRN
Status: DISCONTINUED | OUTPATIENT
Start: 2024-01-01 | End: 2024-01-01 | Stop reason: HOSPADM

## 2024-01-01 RX ORDER — HYDRALAZINE HYDROCHLORIDE 20 MG/ML
10 INJECTION INTRAMUSCULAR; INTRAVENOUS EVERY 4 HOURS PRN
Status: DISCONTINUED | OUTPATIENT
Start: 2024-01-01 | End: 2024-01-01

## 2024-01-01 RX ORDER — HEPARIN SODIUM 5000 [USP'U]/ML
INJECTION, SOLUTION INTRAVENOUS; SUBCUTANEOUS
Status: DISCONTINUED
Start: 2024-01-01 | End: 2024-01-01 | Stop reason: WASHOUT

## 2024-01-01 RX ORDER — QUETIAPINE FUMARATE 25 MG/1
50 TABLET, FILM COATED ORAL NIGHTLY
Status: DISCONTINUED | OUTPATIENT
Start: 2024-01-01 | End: 2024-01-01

## 2024-01-01 RX ORDER — INSULIN LISPRO 100 [IU]/ML
0-15 INJECTION, SOLUTION INTRAVENOUS; SUBCUTANEOUS EVERY 4 HOURS
Status: DISCONTINUED | OUTPATIENT
Start: 2024-01-01 | End: 2024-01-01

## 2024-01-01 RX ORDER — CHLORHEXIDINE GLUCONATE ORAL RINSE 1.2 MG/ML
15 SOLUTION DENTAL ONCE
Status: CANCELLED | OUTPATIENT
Start: 2024-01-01 | End: 2024-01-01

## 2024-01-01 RX ORDER — PROPOFOL 10 MG/ML
5-75 INJECTION, EMULSION INTRAVENOUS CONTINUOUS
Status: DISCONTINUED | OUTPATIENT
Start: 2024-01-01 | End: 2024-01-01

## 2024-01-01 RX ORDER — HEPARIN SODIUM 10000 [USP'U]/100ML
0-2000 INJECTION, SOLUTION INTRAVENOUS CONTINUOUS
Status: DISCONTINUED | OUTPATIENT
Start: 2024-01-01 | End: 2024-01-01 | Stop reason: DRUGHIGH

## 2024-01-01 RX ORDER — SODIUM CHLORIDE, SODIUM LACTATE, POTASSIUM CHLORIDE, CALCIUM CHLORIDE 600; 310; 30; 20 MG/100ML; MG/100ML; MG/100ML; MG/100ML
5 INJECTION, SOLUTION INTRAVENOUS CONTINUOUS
Status: DISCONTINUED | OUTPATIENT
Start: 2024-01-01 | End: 2024-01-01

## 2024-01-01 RX ORDER — CALCITRIOL 1 UG/ML
0.5 SOLUTION ORAL DAILY
Status: DISCONTINUED | OUTPATIENT
Start: 2024-01-01 | End: 2024-01-01

## 2024-01-01 RX ORDER — METHOCARBAMOL 500 MG/1
500 TABLET, FILM COATED ORAL ONCE
Status: COMPLETED | OUTPATIENT
Start: 2024-01-01 | End: 2024-01-01

## 2024-01-01 RX ORDER — ROCURONIUM BROMIDE 10 MG/ML
INJECTION, SOLUTION INTRAVENOUS AS NEEDED
Status: DISCONTINUED | OUTPATIENT
Start: 2024-01-01 | End: 2024-01-01

## 2024-01-01 RX ORDER — NOREPINEPHRINE BITARTRATE/D5W 8 MG/250ML
.01-.5 PLASTIC BAG, INJECTION (ML) INTRAVENOUS CONTINUOUS
Status: DISCONTINUED | OUTPATIENT
Start: 2024-01-01 | End: 2024-01-01

## 2024-01-01 RX ORDER — SULFAMETHOXAZOLE AND TRIMETHOPRIM 200; 40 MG/5ML; MG/5ML
80 SUSPENSION ORAL DAILY
Status: DISCONTINUED | OUTPATIENT
Start: 2024-01-01 | End: 2024-01-01

## 2024-01-01 RX ORDER — DOCUSATE SODIUM 100 MG/1
100 CAPSULE, LIQUID FILLED ORAL 2 TIMES DAILY PRN
Status: DISCONTINUED | OUTPATIENT
Start: 2024-01-01 | End: 2024-01-01

## 2024-01-01 RX ORDER — CEFAZOLIN SODIUM 2 G/100ML
2 INJECTION, SOLUTION INTRAVENOUS EVERY 12 HOURS
Status: DISCONTINUED | OUTPATIENT
Start: 2024-01-01 | End: 2024-01-01

## 2024-01-01 RX ORDER — LIDOCAINE HYDROCHLORIDE 20 MG/ML
INJECTION, SOLUTION INFILTRATION; PERINEURAL
Status: DISPENSED
Start: 2024-01-01 | End: 2024-01-01

## 2024-01-01 RX ORDER — NEOSTIGMINE METHYLSULFATE 1 MG/ML
INJECTION INTRAVENOUS
Status: COMPLETED
Start: 2024-01-01 | End: 2024-01-01

## 2024-01-01 RX ORDER — DOBUTAMINE HYDROCHLORIDE 400 MG/100ML
7.5 INJECTION INTRAVENOUS CONTINUOUS
Status: DISCONTINUED | OUTPATIENT
Start: 2024-01-01 | End: 2024-01-01

## 2024-01-01 RX ORDER — MIDAZOLAM HYDROCHLORIDE 1 MG/ML
INJECTION INTRAMUSCULAR; INTRAVENOUS
Status: COMPLETED
Start: 2024-01-01 | End: 2024-01-01

## 2024-01-01 RX ORDER — PANTOPRAZOLE SODIUM 40 MG/10ML
40 INJECTION, POWDER, LYOPHILIZED, FOR SOLUTION INTRAVENOUS
Status: DISCONTINUED | OUTPATIENT
Start: 2024-01-01 | End: 2024-01-01

## 2024-01-01 RX ORDER — ALBUMIN HUMAN 250 G/1000ML
SOLUTION INTRAVENOUS
Status: COMPLETED
Start: 2024-01-01 | End: 2024-01-01

## 2024-01-01 RX ORDER — ROCURONIUM BROMIDE 10 MG/ML
100 INJECTION, SOLUTION INTRAVENOUS ONCE
Status: COMPLETED | OUTPATIENT
Start: 2024-01-01 | End: 2024-01-01

## 2024-01-01 RX ORDER — AMOXICILLIN 250 MG
2 CAPSULE ORAL 2 TIMES DAILY
Status: DISCONTINUED | OUTPATIENT
Start: 2024-01-01 | End: 2024-01-01

## 2024-01-01 RX ORDER — NOREPINEPHRINE BITARTRATE/D5W 8 MG/250ML
PLASTIC BAG, INJECTION (ML) INTRAVENOUS
Status: DISPENSED
Start: 2024-01-01 | End: 2024-01-01

## 2024-01-01 RX ORDER — LACTULOSE 10 G/15ML
20 SOLUTION ORAL ONCE
Status: DISCONTINUED | OUTPATIENT
Start: 2024-01-01 | End: 2024-01-01

## 2024-01-01 RX ORDER — TACROLIMUS 0.5 MG/1
0.5 CAPSULE ORAL 2 TIMES DAILY
Qty: 60 CAPSULE | Refills: 11 | Status: SHIPPED | OUTPATIENT
Start: 2024-01-01 | End: 2024-04-20

## 2024-01-01 RX ORDER — CALCIUM GLUCONATE 20 MG/ML
2 INJECTION, SOLUTION INTRAVENOUS ONCE
Status: COMPLETED | OUTPATIENT
Start: 2024-01-01 | End: 2024-01-01

## 2024-01-01 RX ORDER — HYDRALAZINE HYDROCHLORIDE 10 MG/1
10 TABLET, FILM COATED ORAL 3 TIMES DAILY
Status: DISCONTINUED | OUTPATIENT
Start: 2024-01-01 | End: 2024-01-01

## 2024-01-01 RX ORDER — FENTANYL CITRATE 50 UG/ML
50 INJECTION, SOLUTION INTRAMUSCULAR; INTRAVENOUS ONCE
Status: COMPLETED | OUTPATIENT
Start: 2024-01-01 | End: 2024-01-01

## 2024-01-01 RX ORDER — HEPARIN SODIUM 1000 [USP'U]/ML
INJECTION, SOLUTION INTRAVENOUS; SUBCUTANEOUS AS NEEDED
Status: DISCONTINUED | OUTPATIENT
Start: 2024-01-01 | End: 2024-01-01

## 2024-01-01 RX ORDER — DIPHENHYDRAMINE HYDROCHLORIDE 50 MG/ML
25 INJECTION INTRAMUSCULAR; INTRAVENOUS ONCE
Status: COMPLETED | OUTPATIENT
Start: 2024-01-01 | End: 2024-01-01

## 2024-01-01 RX ORDER — DEXMEDETOMIDINE HYDROCHLORIDE 4 UG/ML
.1-1.5 INJECTION, SOLUTION INTRAVENOUS CONTINUOUS
Status: DISCONTINUED | OUTPATIENT
Start: 2024-01-01 | End: 2024-01-01

## 2024-01-01 RX ORDER — NALOXONE HYDROCHLORIDE 0.4 MG/ML
0.2 INJECTION, SOLUTION INTRAMUSCULAR; INTRAVENOUS; SUBCUTANEOUS EVERY 5 MIN PRN
Status: CANCELLED | OUTPATIENT
Start: 2024-01-01

## 2024-01-01 RX ORDER — AMOXICILLIN 250 MG
1 CAPSULE ORAL NIGHTLY PRN
Status: DISCONTINUED | OUTPATIENT
Start: 2024-01-01 | End: 2024-01-01

## 2024-01-01 RX ORDER — HYDRALAZINE HYDROCHLORIDE 20 MG/ML
20 INJECTION INTRAMUSCULAR; INTRAVENOUS EVERY 4 HOURS PRN
Status: DISCONTINUED | OUTPATIENT
Start: 2024-01-01 | End: 2024-01-01

## 2024-01-01 RX ORDER — TRANEXAMIC ACID 100 MG/ML
500 INJECTION, SOLUTION INTRAVENOUS ONCE
Status: DISCONTINUED | OUTPATIENT
Start: 2024-01-01 | End: 2024-01-01

## 2024-01-01 RX ORDER — INSULIN LISPRO 100 [IU]/ML
0-10 INJECTION, SOLUTION INTRAVENOUS; SUBCUTANEOUS
Status: DISCONTINUED | OUTPATIENT
Start: 2024-01-01 | End: 2024-01-01

## 2024-01-01 RX ORDER — ACETAMINOPHEN 650 MG/1
650 SUPPOSITORY RECTAL EVERY 4 HOURS
Status: DISCONTINUED | OUTPATIENT
Start: 2024-01-01 | End: 2024-01-01

## 2024-01-01 RX ORDER — MIDAZOLAM HYDROCHLORIDE 1 MG/ML
INJECTION INTRAMUSCULAR; INTRAVENOUS
Status: DISPENSED
Start: 2024-01-01 | End: 2024-01-01

## 2024-01-01 RX ORDER — PREDNISONE 10 MG/1
10 TABLET ORAL ONCE
Status: DISCONTINUED | OUTPATIENT
Start: 2024-01-01 | End: 2024-01-01

## 2024-01-01 RX ORDER — ACETAMINOPHEN 500 MG
10 TABLET ORAL NIGHTLY
Status: DISCONTINUED | OUTPATIENT
Start: 2024-01-01 | End: 2024-01-01

## 2024-01-01 RX ORDER — ETOMIDATE 2 MG/ML
0.3 INJECTION INTRAVENOUS ONCE
Status: COMPLETED | OUTPATIENT
Start: 2024-01-01 | End: 2024-01-01

## 2024-01-01 RX ORDER — HYDROMORPHONE HYDROCHLORIDE 2 MG/1
1 TABLET ORAL EVERY 4 HOURS PRN
Status: DISCONTINUED | OUTPATIENT
Start: 2024-01-01 | End: 2024-01-01

## 2024-01-01 RX ORDER — MILRINONE LACTATE 0.2 MG/ML
0.25 INJECTION, SOLUTION INTRAVENOUS CONTINUOUS
Status: DISCONTINUED | OUTPATIENT
Start: 2024-01-01 | End: 2024-01-01

## 2024-01-01 RX ORDER — LEVOTHYROXINE SODIUM 125 UG/1
250 TABLET ORAL DAILY
Status: DISCONTINUED | OUTPATIENT
Start: 2024-01-01 | End: 2024-01-01

## 2024-01-01 RX ORDER — HYDRALAZINE HYDROCHLORIDE 20 MG/ML
5 INJECTION INTRAMUSCULAR; INTRAVENOUS EVERY 4 HOURS PRN
Status: DISCONTINUED | OUTPATIENT
Start: 2024-01-01 | End: 2024-01-01 | Stop reason: HOSPADM

## 2024-01-01 RX ORDER — ACETAMINOPHEN 500 MG
5 TABLET ORAL DAILY
Status: DISCONTINUED | OUTPATIENT
Start: 2024-01-01 | End: 2024-01-01

## 2024-01-01 RX ORDER — MAGNESIUM SULFATE HEPTAHYDRATE 40 MG/ML
4 INJECTION, SOLUTION INTRAVENOUS ONCE
Status: COMPLETED | OUTPATIENT
Start: 2024-01-01 | End: 2024-01-01

## 2024-01-01 RX ORDER — PREDNISONE 20 MG/1
20 TABLET ORAL DAILY
Status: COMPLETED | OUTPATIENT
Start: 2024-01-01 | End: 2024-01-01

## 2024-01-01 RX ORDER — POLYETHYLENE GLYCOL 3350 17 G/17G
17 POWDER, FOR SOLUTION ORAL 3 TIMES DAILY
Status: DISCONTINUED | OUTPATIENT
Start: 2024-01-01 | End: 2024-01-01

## 2024-01-01 RX ORDER — INSULIN LISPRO 100 [IU]/ML
0-10 INJECTION, SOLUTION INTRAVENOUS; SUBCUTANEOUS EVERY 6 HOURS
Status: DISCONTINUED | OUTPATIENT
Start: 2024-01-01 | End: 2024-01-01

## 2024-01-01 RX ORDER — TRAZODONE HYDROCHLORIDE 50 MG/1
25 TABLET ORAL ONCE
Status: COMPLETED | OUTPATIENT
Start: 2024-01-01 | End: 2024-01-01

## 2024-01-01 RX ORDER — TACROLIMUS 1 MG/1
4 CAPSULE ORAL
Status: DISCONTINUED | OUTPATIENT
Start: 2024-01-01 | End: 2024-01-01

## 2024-01-01 RX ORDER — OXYCODONE HYDROCHLORIDE 5 MG/1
5 TABLET ORAL EVERY 6 HOURS PRN
Status: DISCONTINUED | OUTPATIENT
Start: 2024-01-01 | End: 2024-01-01 | Stop reason: HOSPADM

## 2024-01-01 RX ORDER — ACETAMINOPHEN 160 MG/5ML
650 SOLUTION ORAL ONCE
Status: COMPLETED | OUTPATIENT
Start: 2024-01-01 | End: 2024-01-01

## 2024-01-01 RX ORDER — ACETAMINOPHEN 325 MG/1
975 TABLET ORAL EVERY 6 HOURS
Status: DISCONTINUED | OUTPATIENT
Start: 2024-01-01 | End: 2024-01-01

## 2024-01-01 RX ORDER — LIDOCAINE HYDROCHLORIDE 20 MG/ML
INJECTION, SOLUTION INFILTRATION; PERINEURAL AS NEEDED
Status: DISCONTINUED | OUTPATIENT
Start: 2024-01-01 | End: 2024-01-01 | Stop reason: HOSPADM

## 2024-01-01 RX ORDER — MYCOPHENOLIC ACID 360 MG/1
360 TABLET, DELAYED RELEASE ORAL 2 TIMES DAILY
Status: DISCONTINUED | OUTPATIENT
Start: 2024-01-01 | End: 2024-01-01

## 2024-01-01 RX ORDER — HEPARIN SODIUM 10000 [USP'U]/100ML
100 INJECTION, SOLUTION INTRAVENOUS CONTINUOUS
Status: DISCONTINUED | OUTPATIENT
Start: 2024-01-01 | End: 2024-01-01

## 2024-01-01 RX ORDER — POTASSIUM CHLORIDE 20 MEQ/1
40 TABLET, EXTENDED RELEASE ORAL ONCE
Status: COMPLETED | OUTPATIENT
Start: 2024-01-01 | End: 2024-01-01

## 2024-01-01 RX ORDER — HYDRALAZINE HYDROCHLORIDE 20 MG/ML
INJECTION INTRAMUSCULAR; INTRAVENOUS
Status: COMPLETED
Start: 2024-01-01 | End: 2024-01-01

## 2024-01-01 RX ORDER — MEROPENEM 1 G/1
1 INJECTION, POWDER, FOR SOLUTION INTRAVENOUS EVERY 12 HOURS
Status: DISCONTINUED | OUTPATIENT
Start: 2024-01-01 | End: 2024-01-01 | Stop reason: HOSPADM

## 2024-01-01 RX ORDER — HYDROXYZINE HYDROCHLORIDE 25 MG/1
25 TABLET, FILM COATED ORAL EVERY 6 HOURS PRN
Status: DISCONTINUED | OUTPATIENT
Start: 2024-01-01 | End: 2024-01-01

## 2024-01-01 RX ORDER — DEXTROSE MONOHYDRATE 100 MG/ML
0.3 INJECTION, SOLUTION INTRAVENOUS ONCE AS NEEDED
Status: DISCONTINUED | OUTPATIENT
Start: 2024-01-01 | End: 2024-01-01

## 2024-01-01 RX ORDER — SODIUM,POTASSIUM PHOSPHATES 280-250MG
2 POWDER IN PACKET (EA) ORAL EVERY 8 HOURS
Status: DISCONTINUED | OUTPATIENT
Start: 2024-01-01 | End: 2024-01-01

## 2024-01-01 RX ORDER — SULFAMETHOXAZOLE AND TRIMETHOPRIM 400; 80 MG/1; MG/1
80 TABLET ORAL DAILY
Status: DISCONTINUED | OUTPATIENT
Start: 2024-01-01 | End: 2024-01-01

## 2024-01-01 RX ORDER — OXYCODONE HYDROCHLORIDE 5 MG/1
5 TABLET ORAL EVERY 4 HOURS PRN
Status: CANCELLED | OUTPATIENT
Start: 2024-01-01

## 2024-01-01 RX ORDER — OXYCODONE HYDROCHLORIDE 5 MG/1
2.5 TABLET ORAL EVERY 4 HOURS PRN
Status: DISCONTINUED | OUTPATIENT
Start: 2024-01-01 | End: 2024-01-01

## 2024-01-01 RX ORDER — HYDROMORPHONE HYDROCHLORIDE 1 MG/ML
0.2 INJECTION, SOLUTION INTRAMUSCULAR; INTRAVENOUS; SUBCUTANEOUS ONCE
Status: COMPLETED | OUTPATIENT
Start: 2024-01-01 | End: 2024-01-01

## 2024-01-01 RX ORDER — MIDAZOLAM HYDROCHLORIDE 1 MG/ML
INJECTION INTRAMUSCULAR; INTRAVENOUS CONTINUOUS PRN
Status: DISCONTINUED | OUTPATIENT
Start: 2024-01-01 | End: 2024-01-01

## 2024-01-01 RX ORDER — AMOXICILLIN 250 MG
1 CAPSULE ORAL DAILY
Status: DISCONTINUED | OUTPATIENT
Start: 2024-01-01 | End: 2024-01-01

## 2024-01-01 RX ORDER — HEPARIN SODIUM 10000 [USP'U]/100ML
500 INJECTION, SOLUTION INTRAVENOUS CONTINUOUS
Status: DISCONTINUED | OUTPATIENT
Start: 2024-01-01 | End: 2024-01-01

## 2024-01-01 RX ORDER — NOREPINEPHRINE BITARTRATE/D5W 8 MG/250ML
.01-.2 PLASTIC BAG, INJECTION (ML) INTRAVENOUS CONTINUOUS
Status: DISCONTINUED | OUTPATIENT
Start: 2024-01-01 | End: 2024-01-01

## 2024-01-01 RX ORDER — ACETAMINOPHEN 325 MG/1
650 TABLET ORAL EVERY 6 HOURS
Status: DISCONTINUED | OUTPATIENT
Start: 2024-01-01 | End: 2024-01-01

## 2024-01-01 RX ORDER — PREDNISONE 20 MG/1
40 TABLET ORAL DAILY
Status: COMPLETED | OUTPATIENT
Start: 2024-01-01 | End: 2024-01-01

## 2024-01-01 RX ORDER — IPRATROPIUM BROMIDE AND ALBUTEROL SULFATE 2.5; .5 MG/3ML; MG/3ML
3 SOLUTION RESPIRATORY (INHALATION) EVERY 6 HOURS PRN
Status: DISCONTINUED | OUTPATIENT
Start: 2024-01-01 | End: 2024-01-01 | Stop reason: HOSPADM

## 2024-01-01 RX ORDER — NALOXONE HYDROCHLORIDE 0.4 MG/ML
0.2 INJECTION, SOLUTION INTRAMUSCULAR; INTRAVENOUS; SUBCUTANEOUS EVERY 5 MIN PRN
Status: DISCONTINUED | OUTPATIENT
Start: 2024-01-01 | End: 2024-01-01 | Stop reason: HOSPADM

## 2024-01-01 RX ORDER — METHOCARBAMOL 500 MG/1
500 TABLET, FILM COATED ORAL EVERY 6 HOURS SCHEDULED
Status: DISPENSED | OUTPATIENT
Start: 2024-01-01 | End: 2024-01-01

## 2024-01-01 RX ORDER — METOLAZONE 5 MG/1
5 TABLET ORAL DAILY
Status: DISCONTINUED | OUTPATIENT
Start: 2024-01-01 | End: 2024-01-01

## 2024-01-01 RX ORDER — ISOSORBIDE DINITRATE 10 MG/1
20 TABLET ORAL EVERY 8 HOURS
Status: DISCONTINUED | OUTPATIENT
Start: 2024-01-01 | End: 2024-01-01

## 2024-01-01 RX ORDER — TRANEXAMIC ACID 650 MG/1
1300 TABLET ORAL ONCE
Status: DISCONTINUED | OUTPATIENT
Start: 2024-01-01 | End: 2024-01-01

## 2024-01-01 RX ORDER — SIROLIMUS 0.5 MG/1
1 TABLET, FILM COATED ORAL DAILY
Qty: 90 TABLET | Refills: 3 | Status: SHIPPED | OUTPATIENT
Start: 2024-01-01 | End: 2024-01-01 | Stop reason: DRUGHIGH

## 2024-01-01 RX ORDER — FENTANYL CITRATE 50 UG/ML
INJECTION, SOLUTION INTRAMUSCULAR; INTRAVENOUS AS NEEDED
Status: DISCONTINUED | OUTPATIENT
Start: 2024-01-01 | End: 2024-01-01 | Stop reason: HOSPADM

## 2024-01-01 RX ORDER — CEFAZOLIN SODIUM 2 G/100ML
2 INJECTION, SOLUTION INTRAVENOUS EVERY 8 HOURS
Status: DISCONTINUED | OUTPATIENT
Start: 2024-01-01 | End: 2024-01-01

## 2024-01-01 RX ORDER — PROPOFOL 10 MG/ML
INJECTION, EMULSION INTRAVENOUS
Status: COMPLETED
Start: 2024-01-01 | End: 2024-01-01

## 2024-01-01 RX ORDER — FENTANYL CITRATE-0.9 % NACL/PF 10 MCG/ML
PREFILLED PUMP RESERVOIR INJECTION AS NEEDED
Status: DISCONTINUED | OUTPATIENT
Start: 2024-01-01 | End: 2024-01-01 | Stop reason: HOSPADM

## 2024-01-01 RX ORDER — VANCOMYCIN HYDROCHLORIDE 1 G/200ML
1000 INJECTION, SOLUTION INTRAVENOUS ONCE
Status: DISCONTINUED | OUTPATIENT
Start: 2024-01-01 | End: 2024-01-01

## 2024-01-01 RX ORDER — HEPARIN SODIUM 5000 [USP'U]/ML
2000-4000 INJECTION, SOLUTION INTRAVENOUS; SUBCUTANEOUS EVERY 4 HOURS PRN
Status: DISCONTINUED | OUTPATIENT
Start: 2024-01-01 | End: 2024-01-01

## 2024-01-01 RX ORDER — CALCIUM CARBONATE 500(1250)
1250 TABLET ORAL
Status: DISCONTINUED | OUTPATIENT
Start: 2024-01-01 | End: 2024-01-01

## 2024-01-01 RX ORDER — HEPARIN SODIUM 5000 [USP'U]/ML
3000-6000 INJECTION, SOLUTION INTRAVENOUS; SUBCUTANEOUS EVERY 4 HOURS PRN
Status: DISCONTINUED | OUTPATIENT
Start: 2024-01-01 | End: 2024-01-01

## 2024-01-01 RX ORDER — MULTIVIT-MIN/IRON FUM/FOLIC AC 7.5 MG-4
1 TABLET ORAL DAILY
Status: DISCONTINUED | OUTPATIENT
Start: 2024-01-01 | End: 2024-01-01 | Stop reason: HOSPADM

## 2024-01-01 RX ORDER — DEXTROSE 50 % IN WATER (D50W) INTRAVENOUS SYRINGE
25
Status: DISCONTINUED | OUTPATIENT
Start: 2024-01-01 | End: 2024-01-01 | Stop reason: HOSPADM

## 2024-01-01 RX ORDER — ONDANSETRON 4 MG/1
4 TABLET, FILM COATED ORAL EVERY 8 HOURS PRN
Status: CANCELLED | OUTPATIENT
Start: 2024-01-01

## 2024-01-01 RX ORDER — TACROLIMUS 0.5 MG/1
0.5 CAPSULE ORAL
Status: DISCONTINUED | OUTPATIENT
Start: 2024-01-01 | End: 2024-01-01

## 2024-01-01 RX ORDER — LORAZEPAM 2 MG/ML
INJECTION INTRAMUSCULAR
Status: COMPLETED
Start: 2024-01-01 | End: 2024-01-01

## 2024-01-01 RX ORDER — PREDNISONE 10 MG/1
10 TABLET ORAL DAILY
Status: DISCONTINUED | OUTPATIENT
Start: 2024-01-01 | End: 2024-01-01

## 2024-01-01 RX ORDER — ACETAMINOPHEN 160 MG/5ML
650 SOLUTION ORAL EVERY 6 HOURS PRN
Status: DISCONTINUED | OUTPATIENT
Start: 2024-01-01 | End: 2024-01-01

## 2024-01-01 RX ORDER — HYDROXYZINE HYDROCHLORIDE 25 MG/1
TABLET, FILM COATED ORAL
Status: COMPLETED
Start: 2024-01-01 | End: 2024-01-01

## 2024-01-01 RX ORDER — QUETIAPINE FUMARATE 25 MG/1
50 TABLET, FILM COATED ORAL ONCE
Status: COMPLETED | OUTPATIENT
Start: 2024-01-01 | End: 2024-01-01

## 2024-01-01 RX ORDER — HEPARIN SODIUM 5000 [USP'U]/ML
5000 INJECTION, SOLUTION INTRAVENOUS; SUBCUTANEOUS EVERY 8 HOURS
Status: DISCONTINUED | OUTPATIENT
Start: 2024-01-01 | End: 2024-01-01

## 2024-01-01 RX ORDER — DOBUTAMINE HYDROCHLORIDE 400 MG/100ML
2.5 INJECTION INTRAVENOUS CONTINUOUS
Status: DISCONTINUED | OUTPATIENT
Start: 2024-01-01 | End: 2024-01-01

## 2024-01-01 RX ORDER — CEPHALEXIN 500 MG/1
500 CAPSULE ORAL EVERY 8 HOURS SCHEDULED
Status: DISCONTINUED | OUTPATIENT
Start: 2024-01-01 | End: 2024-01-01

## 2024-01-01 RX ORDER — ACETAMINOPHEN 325 MG/1
650 TABLET ORAL EVERY 6 HOURS PRN
Status: DISCONTINUED | OUTPATIENT
Start: 2024-01-01 | End: 2024-01-01

## 2024-01-01 RX ORDER — LEVOTHYROXINE SODIUM 200 UG/1
200 TABLET ORAL DAILY
Qty: 90 TABLET | Refills: 3 | Status: CANCELLED | OUTPATIENT
Start: 2024-01-01 | End: 2025-03-07

## 2024-01-01 RX ORDER — SIROLIMUS 1 MG/ML
1.5 SOLUTION ORAL DAILY
Status: DISCONTINUED | OUTPATIENT
Start: 2024-01-01 | End: 2024-01-01

## 2024-01-01 RX ORDER — ADHESIVE BANDAGE
30 BANDAGE TOPICAL ONCE
Status: COMPLETED | OUTPATIENT
Start: 2024-01-01 | End: 2024-01-01

## 2024-01-01 RX ORDER — FENTANYL CITRATE 50 UG/ML
INJECTION, SOLUTION INTRAMUSCULAR; INTRAVENOUS
Status: COMPLETED
Start: 2024-01-01 | End: 2024-01-01

## 2024-01-01 RX ORDER — AMOXICILLIN 250 MG
1 CAPSULE ORAL NIGHTLY
Status: DISCONTINUED | OUTPATIENT
Start: 2024-01-01 | End: 2024-01-01

## 2024-01-01 RX ORDER — CALCITRIOL 0.5 UG/1
0.5 CAPSULE ORAL DAILY
Status: DISCONTINUED | OUTPATIENT
Start: 2024-01-01 | End: 2024-01-01

## 2024-01-01 RX ORDER — ESOMEPRAZOLE MAGNESIUM 40 MG/1
40 GRANULE, DELAYED RELEASE ORAL
Status: DISCONTINUED | OUTPATIENT
Start: 2024-01-01 | End: 2024-01-01

## 2024-01-01 RX ORDER — SIROLIMUS 1 MG/ML
2 SOLUTION ORAL DAILY
Status: DISCONTINUED | OUTPATIENT
Start: 2024-01-01 | End: 2024-01-01

## 2024-01-01 RX ORDER — CEFAZOLIN SODIUM 2 G/100ML
2 INJECTION, SOLUTION INTRAVENOUS ONCE
Status: CANCELLED | OUTPATIENT
Start: 2024-01-01 | End: 2024-01-01

## 2024-01-01 RX ORDER — BISACODYL 10 MG/1
10 SUPPOSITORY RECTAL ONCE
Status: DISCONTINUED | OUTPATIENT
Start: 2024-01-01 | End: 2024-01-01

## 2024-01-01 RX ORDER — FERROUS SULFATE 325(65) MG
1 TABLET ORAL DAILY
Status: DISCONTINUED | OUTPATIENT
Start: 2024-01-01 | End: 2024-01-01

## 2024-01-01 RX ORDER — FOLIC ACID 1 MG/1
1 TABLET ORAL DAILY
Status: DISCONTINUED | OUTPATIENT
Start: 2024-01-01 | End: 2024-01-01 | Stop reason: HOSPADM

## 2024-01-01 RX ORDER — BISACODYL 10 MG/1
10 SUPPOSITORY RECTAL DAILY
Status: DISCONTINUED | OUTPATIENT
Start: 2024-01-01 | End: 2024-01-01

## 2024-01-01 RX ORDER — TRAMADOL HYDROCHLORIDE 50 MG/1
50 TABLET ORAL EVERY 6 HOURS PRN
Status: DISCONTINUED | OUTPATIENT
Start: 2024-01-01 | End: 2024-01-01

## 2024-01-01 RX ORDER — PREDNISONE 10 MG/1
60 TABLET ORAL ONCE
Status: COMPLETED | OUTPATIENT
Start: 2024-01-01 | End: 2024-01-01

## 2024-01-01 RX ORDER — POTASSIUM CHLORIDE 14.9 MG/ML
20 INJECTION INTRAVENOUS ONCE
Status: DISCONTINUED | OUTPATIENT
Start: 2024-01-01 | End: 2024-01-01

## 2024-01-01 RX ORDER — OLANZAPINE 10 MG/2ML
2.5 INJECTION, POWDER, FOR SOLUTION INTRAMUSCULAR 2 TIMES DAILY PRN
Status: DISCONTINUED | OUTPATIENT
Start: 2024-01-01 | End: 2024-01-01

## 2024-01-01 RX ORDER — DOCUSATE SODIUM 100 MG/1
100 CAPSULE, LIQUID FILLED ORAL 2 TIMES DAILY
Status: DISCONTINUED | OUTPATIENT
Start: 2024-01-01 | End: 2024-01-01

## 2024-01-01 RX ORDER — MUPIROCIN 20 MG/G
OINTMENT TOPICAL 2 TIMES DAILY PRN
Status: DISCONTINUED | OUTPATIENT
Start: 2024-01-01 | End: 2024-01-01 | Stop reason: HOSPADM

## 2024-01-01 RX ORDER — INSULIN LISPRO 100 [IU]/ML
0-15 INJECTION, SOLUTION INTRAVENOUS; SUBCUTANEOUS
Status: DISCONTINUED | OUTPATIENT
Start: 2024-01-01 | End: 2024-01-01 | Stop reason: HOSPADM

## 2024-01-01 RX ORDER — MYCOPHENOLIC ACID 180 MG/1
360 TABLET, DELAYED RELEASE ORAL 2 TIMES DAILY
Status: DISCONTINUED | OUTPATIENT
Start: 2024-01-01 | End: 2024-01-01

## 2024-01-01 RX ORDER — SUCCINYLCHOLINE CHLORIDE 100 MG/5ML
SYRINGE (ML) INTRAVENOUS AS NEEDED
Status: DISCONTINUED | OUTPATIENT
Start: 2024-01-01 | End: 2024-01-01

## 2024-01-01 RX ORDER — BISACODYL 10 MG/1
10 SUPPOSITORY RECTAL DAILY PRN
Status: DISCONTINUED | OUTPATIENT
Start: 2024-01-01 | End: 2024-01-01 | Stop reason: HOSPADM

## 2024-01-01 RX ORDER — HYDROMORPHONE HYDROCHLORIDE 1 MG/ML
0.2 INJECTION, SOLUTION INTRAMUSCULAR; INTRAVENOUS; SUBCUTANEOUS EVERY 2 HOUR PRN
Status: DISCONTINUED | OUTPATIENT
Start: 2024-01-01 | End: 2024-01-01

## 2024-01-01 RX ORDER — CALCIUM GLUCONATE 20 MG/ML
5612 INJECTION, SOLUTION INTRAVENOUS ONCE
Status: COMPLETED | OUTPATIENT
Start: 2024-01-01 | End: 2024-01-01

## 2024-01-01 RX ORDER — ASPIRIN 81 MG/1
81 TABLET ORAL DAILY
Status: DISCONTINUED | OUTPATIENT
Start: 2024-01-01 | End: 2024-01-01

## 2024-01-01 RX ORDER — CALCIUM GLUCONATE 20 MG/ML
2 INJECTION, SOLUTION INTRAVENOUS EVERY 6 HOURS PRN
Status: DISCONTINUED | OUTPATIENT
Start: 2024-01-01 | End: 2024-01-01

## 2024-01-01 RX ORDER — VANCOMYCIN HYDROCHLORIDE 1 G/20ML
INJECTION, POWDER, LYOPHILIZED, FOR SOLUTION INTRAVENOUS DAILY PRN
Status: DISCONTINUED | OUTPATIENT
Start: 2024-01-01 | End: 2024-01-01 | Stop reason: HOSPADM

## 2024-01-01 RX ORDER — PANTOPRAZOLE SODIUM 40 MG/10ML
40 INJECTION, POWDER, LYOPHILIZED, FOR SOLUTION INTRAVENOUS 2 TIMES DAILY
Status: DISCONTINUED | OUTPATIENT
Start: 2024-01-01 | End: 2024-01-01

## 2024-01-01 RX ORDER — ACETAMINOPHEN 325 MG/1
650 TABLET ORAL EVERY 4 HOURS
Status: DISCONTINUED | OUTPATIENT
Start: 2024-01-01 | End: 2024-01-01

## 2024-01-01 RX ORDER — ALBUMIN HUMAN 50 G/1000ML
25 SOLUTION INTRAVENOUS ONCE
Status: DISCONTINUED | OUTPATIENT
Start: 2024-01-01 | End: 2024-01-01

## 2024-01-01 RX ORDER — TACROLIMUS 1 MG/1
2 CAPSULE ORAL EVERY 24 HOURS
Status: DISCONTINUED | OUTPATIENT
Start: 2024-01-01 | End: 2024-01-01

## 2024-01-01 RX ORDER — VANCOMYCIN HYDROCHLORIDE 1 G/200ML
2 INJECTION, SOLUTION INTRAVENOUS ONCE
Status: COMPLETED | OUTPATIENT
Start: 2024-01-01 | End: 2024-01-01

## 2024-01-01 RX ORDER — SIROLIMUS 0.5 MG/1
0.5 TABLET, FILM COATED ORAL DAILY
Status: DISCONTINUED | OUTPATIENT
Start: 2024-01-01 | End: 2024-01-01

## 2024-01-01 RX ORDER — VALGANCICLOVIR 450 MG/1
450 TABLET, FILM COATED ORAL
Status: DISCONTINUED | OUTPATIENT
Start: 2024-01-01 | End: 2024-01-01

## 2024-01-01 RX ORDER — ALBUMIN HUMAN 250 G/1000ML
25 SOLUTION INTRAVENOUS EVERY 6 HOURS
Status: COMPLETED | OUTPATIENT
Start: 2024-01-01 | End: 2024-01-01

## 2024-01-01 RX ORDER — VALGANCICLOVIR HYDROCHLORIDE 50 MG/ML
450 POWDER, FOR SOLUTION ORAL
Status: DISCONTINUED | OUTPATIENT
Start: 2024-01-01 | End: 2024-01-01

## 2024-01-01 RX ORDER — ISOSORBIDE DINITRATE 10 MG/1
TABLET ORAL
Status: COMPLETED
Start: 2024-01-01 | End: 2024-01-01

## 2024-01-01 RX ORDER — MUPIROCIN 20 MG/G
1 OINTMENT TOPICAL 2 TIMES DAILY
Status: DISCONTINUED | OUTPATIENT
Start: 2024-01-01 | End: 2024-01-01 | Stop reason: HOSPADM

## 2024-01-01 RX ORDER — AMIODARONE HYDROCHLORIDE 200 MG/1
400 TABLET ORAL 2 TIMES DAILY
Status: DISCONTINUED | OUTPATIENT
Start: 2024-01-01 | End: 2024-01-01

## 2024-01-01 RX ORDER — TACROLIMUS 1 MG/1
1 CAPSULE ORAL
Status: DISCONTINUED | OUTPATIENT
Start: 2024-01-01 | End: 2024-01-01

## 2024-01-01 RX ORDER — TACROLIMUS 1 MG/1
1 CAPSULE ORAL ONCE
Status: COMPLETED | OUTPATIENT
Start: 2024-01-01 | End: 2024-01-01

## 2024-01-01 RX ORDER — SIROLIMUS 1 MG/1
2 TABLET, FILM COATED ORAL
Status: DISCONTINUED | OUTPATIENT
Start: 2024-01-01 | End: 2024-01-01

## 2024-01-01 RX ORDER — ONDANSETRON HYDROCHLORIDE 2 MG/ML
4 INJECTION, SOLUTION INTRAVENOUS EVERY 6 HOURS PRN
Status: DISCONTINUED | OUTPATIENT
Start: 2024-01-01 | End: 2024-01-01

## 2024-01-01 RX ORDER — FERROUS SULFATE 325(65) MG
65 TABLET ORAL
Status: DISCONTINUED | OUTPATIENT
Start: 2024-01-01 | End: 2024-01-01 | Stop reason: HOSPADM

## 2024-01-01 RX ORDER — TRAMADOL HYDROCHLORIDE 50 MG/1
50 TABLET ORAL EVERY 6 HOURS PRN
Qty: 100 TABLET | Refills: 0 | Status: SHIPPED | OUTPATIENT
Start: 2024-01-01 | End: 2024-01-01

## 2024-01-01 RX ORDER — GLYCOPYRROLATE 0.2 MG/ML
0.6 INJECTION INTRAMUSCULAR; INTRAVENOUS ONCE
Status: COMPLETED | OUTPATIENT
Start: 2024-01-01 | End: 2024-01-01

## 2024-01-01 RX ORDER — LIDOCAINE 560 MG/1
1 PATCH PERCUTANEOUS; TOPICAL; TRANSDERMAL DAILY
Status: DISCONTINUED | OUTPATIENT
Start: 2024-01-01 | End: 2024-01-01 | Stop reason: HOSPADM

## 2024-01-01 RX ORDER — LORAZEPAM 2 MG/ML
0.5 INJECTION INTRAMUSCULAR EVERY 4 HOURS PRN
Status: DISCONTINUED | OUTPATIENT
Start: 2024-01-01 | End: 2024-01-01 | Stop reason: HOSPADM

## 2024-01-01 RX ORDER — ROCURONIUM BROMIDE 10 MG/ML
1.2 INJECTION, SOLUTION INTRAVENOUS ONCE
Status: DISCONTINUED | OUTPATIENT
Start: 2024-01-01 | End: 2024-01-01

## 2024-01-01 RX ORDER — SODIUM NITROPRUSSIDE IN 0.9% SODIUM CHLORIDE 0.5 MG/ML
0-5 INJECTION INTRAVENOUS CONTINUOUS
Status: DISCONTINUED | OUTPATIENT
Start: 2024-01-01 | End: 2024-01-01

## 2024-01-01 RX ORDER — ACETAMINOPHEN 500 MG
5 TABLET ORAL NIGHTLY PRN
Status: DISCONTINUED | OUTPATIENT
Start: 2024-01-01 | End: 2024-01-01

## 2024-01-01 RX ORDER — GLYCOPYRROLATE 0.2 MG/ML
0.8 INJECTION INTRAMUSCULAR; INTRAVENOUS ONCE
Status: DISCONTINUED | OUTPATIENT
Start: 2024-01-01 | End: 2024-01-01

## 2024-01-01 RX ORDER — ACETAMINOPHEN 160 MG/5ML
650 SOLUTION ORAL EVERY 6 HOURS
Status: DISCONTINUED | OUTPATIENT
Start: 2024-01-01 | End: 2024-01-01

## 2024-01-01 RX ORDER — SODIUM CHLORIDE, SODIUM LACTATE, POTASSIUM CHLORIDE, CALCIUM CHLORIDE 600; 310; 30; 20 MG/100ML; MG/100ML; MG/100ML; MG/100ML
10 INJECTION, SOLUTION INTRAVENOUS CONTINUOUS
Status: DISCONTINUED | OUTPATIENT
Start: 2024-01-01 | End: 2024-01-01

## 2024-01-01 RX ORDER — LACTULOSE 10 G/15ML
20 SOLUTION ORAL DAILY
Status: DISCONTINUED | OUTPATIENT
Start: 2024-01-01 | End: 2024-01-01

## 2024-01-01 RX ORDER — HEPARIN SODIUM 1000 [USP'U]/ML
1000 INJECTION, SOLUTION INTRAVENOUS; SUBCUTANEOUS ONCE
Status: DISCONTINUED | OUTPATIENT
Start: 2024-01-01 | End: 2024-01-01 | Stop reason: HOSPADM

## 2024-01-01 RX ORDER — MAGNESIUM SULFATE HEPTAHYDRATE 40 MG/ML
2 INJECTION, SOLUTION INTRAVENOUS EVERY 6 HOURS PRN
Status: DISCONTINUED | OUTPATIENT
Start: 2024-01-01 | End: 2024-01-01 | Stop reason: HOSPADM

## 2024-01-01 RX ORDER — PANTOPRAZOLE SODIUM 40 MG/10ML
40 INJECTION, POWDER, LYOPHILIZED, FOR SOLUTION INTRAVENOUS DAILY
Status: DISCONTINUED | OUTPATIENT
Start: 2024-01-01 | End: 2024-01-01 | Stop reason: HOSPADM

## 2024-01-01 RX ORDER — ONDANSETRON HYDROCHLORIDE 2 MG/ML
4 INJECTION, SOLUTION INTRAVENOUS EVERY 8 HOURS PRN
Status: CANCELLED | OUTPATIENT
Start: 2024-01-01

## 2024-01-01 RX ORDER — AMLODIPINE BESYLATE 5 MG/1
2.5 TABLET ORAL DAILY
Status: DISCONTINUED | OUTPATIENT
Start: 2024-01-01 | End: 2024-01-01

## 2024-01-01 RX ORDER — SODIUM CHLORIDE, SODIUM LACTATE, POTASSIUM CHLORIDE, CALCIUM CHLORIDE 600; 310; 30; 20 MG/100ML; MG/100ML; MG/100ML; MG/100ML
5 INJECTION, SOLUTION INTRAVENOUS CONTINUOUS
Status: DISCONTINUED | OUTPATIENT
Start: 2024-01-01 | End: 2024-01-01 | Stop reason: HOSPADM

## 2024-01-01 RX ORDER — OXYMETAZOLINE HCL 0.05 %
2 SPRAY, NON-AEROSOL (ML) NASAL EVERY 12 HOURS PRN
Status: DISPENSED | OUTPATIENT
Start: 2024-01-01 | End: 2024-01-01

## 2024-01-01 RX ORDER — QUETIAPINE FUMARATE 25 MG/1
25 TABLET, FILM COATED ORAL ONCE
Status: COMPLETED | OUTPATIENT
Start: 2024-01-01 | End: 2024-01-01

## 2024-01-01 RX ORDER — OXYCODONE HCL 5 MG/5 ML
5 SOLUTION, ORAL ORAL EVERY 4 HOURS PRN
Status: DISCONTINUED | OUTPATIENT
Start: 2024-01-01 | End: 2024-01-01

## 2024-01-01 RX ORDER — FLUCONAZOLE 200 MG/1
200 TABLET ORAL ONCE
Status: COMPLETED | OUTPATIENT
Start: 2024-01-01 | End: 2024-01-01

## 2024-01-01 RX ORDER — INSULIN LISPRO 100 [IU]/ML
0-15 INJECTION, SOLUTION INTRAVENOUS; SUBCUTANEOUS
Status: DISCONTINUED | OUTPATIENT
Start: 2024-01-01 | End: 2024-01-01

## 2024-01-01 RX ORDER — VANCOMYCIN HYDROCHLORIDE 1 G/20ML
INJECTION, POWDER, LYOPHILIZED, FOR SOLUTION INTRAVENOUS AS NEEDED
Status: DISCONTINUED | OUTPATIENT
Start: 2024-01-01 | End: 2024-01-01

## 2024-01-01 RX ORDER — SIROLIMUS 0.5 MG/1
0.5 TABLET, FILM COATED ORAL DAILY
Qty: 90 TABLET | Refills: 3 | COMMUNITY
Start: 2024-01-01

## 2024-01-01 RX ORDER — CHLOROTHIAZIDE SODIUM 500 MG/1
500 INJECTION INTRAVENOUS ONCE
Status: COMPLETED | OUTPATIENT
Start: 2024-01-01 | End: 2024-01-01

## 2024-01-01 RX ORDER — CALCIUM GLUCONATE 20 MG/ML
5752 INJECTION, SOLUTION INTRAVENOUS ONCE
Status: COMPLETED | OUTPATIENT
Start: 2024-01-01 | End: 2024-01-01

## 2024-01-01 RX ORDER — PSYLLIUM HUSK 0.4 G
1 CAPSULE ORAL DAILY
Status: DISCONTINUED | OUTPATIENT
Start: 2024-01-01 | End: 2024-01-01 | Stop reason: HOSPADM

## 2024-01-01 RX ORDER — ACETAMINOPHEN 500 MG
10 TABLET ORAL DAILY
Status: DISCONTINUED | OUTPATIENT
Start: 2024-01-01 | End: 2024-01-01

## 2024-01-01 RX ORDER — INSULIN LISPRO 100 [IU]/ML
0-15 INJECTION, SOLUTION INTRAVENOUS; SUBCUTANEOUS EVERY 6 HOURS
Status: DISCONTINUED | OUTPATIENT
Start: 2024-01-01 | End: 2024-01-01

## 2024-01-01 RX ORDER — HEPARIN SODIUM 5000 [USP'U]/ML
4000 INJECTION, SOLUTION INTRAVENOUS; SUBCUTANEOUS ONCE
Status: DISCONTINUED | OUTPATIENT
Start: 2024-01-01 | End: 2024-01-01

## 2024-01-01 RX ORDER — DIPHENHYDRAMINE HYDROCHLORIDE 50 MG/ML
25 INJECTION INTRAMUSCULAR; INTRAVENOUS EVERY 5 MIN PRN
Status: CANCELLED | OUTPATIENT
Start: 2024-01-01

## 2024-01-01 RX ORDER — MAGNESIUM SULFATE 1 G/100ML
1 INJECTION INTRAVENOUS EVERY 6 HOURS PRN
Status: DISCONTINUED | OUTPATIENT
Start: 2024-01-01 | End: 2024-01-01

## 2024-01-01 RX ORDER — TALC
6 POWDER (GRAM) TOPICAL NIGHTLY
Status: DISCONTINUED | OUTPATIENT
Start: 2024-01-01 | End: 2024-01-01 | Stop reason: SDUPTHER

## 2024-01-01 RX ORDER — POLYETHYLENE GLYCOL 3350 17 G/17G
17 POWDER, FOR SOLUTION ORAL DAILY
Status: DISCONTINUED | OUTPATIENT
Start: 2024-01-01 | End: 2024-01-01

## 2024-01-01 RX ORDER — HYDRALAZINE HYDROCHLORIDE 20 MG/ML
5 INJECTION INTRAMUSCULAR; INTRAVENOUS EVERY 4 HOURS PRN
Status: DISCONTINUED | OUTPATIENT
Start: 2024-01-01 | End: 2024-01-01

## 2024-01-01 RX ORDER — MAGNESIUM SULFATE HEPTAHYDRATE 40 MG/ML
2 INJECTION, SOLUTION INTRAVENOUS EVERY 6 HOURS PRN
Status: DISCONTINUED | OUTPATIENT
Start: 2024-01-01 | End: 2024-01-01

## 2024-01-01 RX ORDER — ADHESIVE BANDAGE
30 BANDAGE TOPICAL ONCE
Status: DISCONTINUED | OUTPATIENT
Start: 2024-01-01 | End: 2024-01-01

## 2024-01-01 RX ORDER — POTASSIUM CHLORIDE 20 MEQ/1
20 TABLET, EXTENDED RELEASE ORAL ONCE
Status: DISCONTINUED | OUTPATIENT
Start: 2024-01-01 | End: 2024-01-01

## 2024-01-01 RX ORDER — OXYCODONE HYDROCHLORIDE 5 MG/1
5 TABLET ORAL ONCE
Status: COMPLETED | OUTPATIENT
Start: 2024-01-01 | End: 2024-01-01

## 2024-01-01 RX ORDER — ALBUMIN HUMAN 250 G/1000ML
50 SOLUTION INTRAVENOUS ONCE
Status: COMPLETED | OUTPATIENT
Start: 2024-01-01 | End: 2024-01-01

## 2024-01-01 RX ORDER — NYSTATIN 100000 [USP'U]/ML
5 SUSPENSION ORAL EVERY 6 HOURS
Status: DISCONTINUED | OUTPATIENT
Start: 2024-01-01 | End: 2024-01-01 | Stop reason: HOSPADM

## 2024-01-01 RX ORDER — NYSTATIN 100000 U/G
CREAM TOPICAL 2 TIMES DAILY
Qty: 15 G | Refills: 1 | Status: SHIPPED | OUTPATIENT
Start: 2024-01-01 | End: 2024-01-01

## 2024-01-01 RX ORDER — LACTULOSE 10 G/15ML
30 SOLUTION ORAL
Status: DISCONTINUED | OUTPATIENT
Start: 2024-01-01 | End: 2024-01-01

## 2024-01-01 RX ORDER — ISOSORBIDE DINITRATE 10 MG/1
10 TABLET ORAL
Status: DISCONTINUED | OUTPATIENT
Start: 2024-01-01 | End: 2024-01-01

## 2024-01-01 RX ORDER — DIPHENHYDRAMINE HYDROCHLORIDE 50 MG/ML
25 INJECTION INTRAMUSCULAR; INTRAVENOUS EVERY 5 MIN PRN
Status: COMPLETED | OUTPATIENT
Start: 2024-01-01 | End: 2024-01-01

## 2024-01-01 RX ORDER — POTASSIUM CHLORIDE 29.8 MG/ML
INJECTION INTRAVENOUS
Status: DISPENSED
Start: 2024-01-01 | End: 2024-01-01

## 2024-01-01 RX ORDER — PANTOPRAZOLE SODIUM 40 MG/1
40 TABLET, DELAYED RELEASE ORAL 2 TIMES DAILY
Status: DISCONTINUED | OUTPATIENT
Start: 2024-01-01 | End: 2024-01-01

## 2024-01-01 RX ORDER — DEXMEDETOMIDINE HYDROCHLORIDE 4 UG/ML
INJECTION, SOLUTION INTRAVENOUS
Status: COMPLETED
Start: 2024-01-01 | End: 2024-01-01

## 2024-01-01 RX ORDER — CALCIUM GLUCONATE 20 MG/ML
INJECTION, SOLUTION INTRAVENOUS
Status: COMPLETED
Start: 2024-01-01 | End: 2024-01-01

## 2024-01-01 RX ORDER — SODIUM CHLORIDE, SODIUM LACTATE, POTASSIUM CHLORIDE, CALCIUM CHLORIDE 600; 310; 30; 20 MG/100ML; MG/100ML; MG/100ML; MG/100ML
5 INJECTION, SOLUTION INTRAVENOUS CONTINUOUS
Status: CANCELLED | OUTPATIENT
Start: 2024-01-01

## 2024-01-01 RX ORDER — POTASSIUM CHLORIDE 20 MEQ/1
20 TABLET, EXTENDED RELEASE ORAL EVERY 6 HOURS PRN
Status: DISCONTINUED | OUTPATIENT
Start: 2024-01-01 | End: 2024-01-01

## 2024-01-01 RX ORDER — ROSUVASTATIN CALCIUM 10 MG/1
10 TABLET, COATED ORAL NIGHTLY
Status: DISCONTINUED | OUTPATIENT
Start: 2024-01-01 | End: 2024-01-01

## 2024-01-01 RX ORDER — OXYCODONE HYDROCHLORIDE 5 MG/1
10 TABLET ORAL EVERY 4 HOURS PRN
Status: DISCONTINUED | OUTPATIENT
Start: 2024-01-01 | End: 2024-01-01

## 2024-01-01 RX ORDER — HYDRALAZINE HYDROCHLORIDE 20 MG/ML
10 INJECTION INTRAMUSCULAR; INTRAVENOUS ONCE
Status: COMPLETED | OUTPATIENT
Start: 2024-01-01 | End: 2024-01-01

## 2024-01-01 RX ORDER — DAPSONE 100 MG/1
100 TABLET ORAL DAILY
Status: DISCONTINUED | OUTPATIENT
Start: 2024-01-01 | End: 2024-01-01

## 2024-01-01 RX ORDER — ACETAMINOPHEN 650 MG/1
650 SUPPOSITORY RECTAL EVERY 4 HOURS
Status: CANCELLED | OUTPATIENT
Start: 2024-01-01

## 2024-01-01 RX ORDER — ONDANSETRON HYDROCHLORIDE 2 MG/ML
INJECTION, SOLUTION INTRAVENOUS AS NEEDED
Status: DISCONTINUED | OUTPATIENT
Start: 2024-01-01 | End: 2024-01-01

## 2024-01-01 RX ORDER — PROPOFOL 10 MG/ML
100 INJECTION, EMULSION INTRAVENOUS ONCE
Status: COMPLETED | OUTPATIENT
Start: 2024-01-01 | End: 2024-01-01

## 2024-01-01 RX ORDER — HYDRALAZINE HYDROCHLORIDE 10 MG/1
10 TABLET, FILM COATED ORAL EVERY 8 HOURS
Status: DISCONTINUED | OUTPATIENT
Start: 2024-01-01 | End: 2024-01-01

## 2024-01-01 RX ORDER — LEVOTHYROXINE SODIUM ANHYDROUS 100 UG/5ML
100 INJECTION, POWDER, LYOPHILIZED, FOR SOLUTION INTRAVENOUS
Status: DISCONTINUED | OUTPATIENT
Start: 2024-01-01 | End: 2024-01-01

## 2024-01-01 RX ORDER — TRAZODONE HYDROCHLORIDE 50 MG/1
50 TABLET ORAL NIGHTLY PRN
Status: DISCONTINUED | OUTPATIENT
Start: 2024-01-01 | End: 2024-01-01

## 2024-01-01 RX ORDER — DOBUTAMINE HYDROCHLORIDE 400 MG/100ML
0-20 INJECTION INTRAVENOUS CONTINUOUS
Status: DISCONTINUED | OUTPATIENT
Start: 2024-01-01 | End: 2024-01-01

## 2024-01-01 RX ORDER — CALCIUM GLUCONATE 20 MG/ML
2 INJECTION, SOLUTION INTRAVENOUS EVERY 6 HOURS PRN
Status: DISCONTINUED | OUTPATIENT
Start: 2024-01-01 | End: 2024-01-01 | Stop reason: HOSPADM

## 2024-01-01 RX ORDER — ONDANSETRON 2 MG/ML
INJECTION INTRAMUSCULAR; INTRAVENOUS AS NEEDED
Status: DISCONTINUED | OUTPATIENT
Start: 2024-01-01 | End: 2024-01-01

## 2024-01-01 RX ORDER — HYDRALAZINE HYDROCHLORIDE 50 MG/1
50 TABLET, FILM COATED ORAL EVERY 8 HOURS
Status: DISCONTINUED | OUTPATIENT
Start: 2024-01-01 | End: 2024-01-01

## 2024-01-01 RX ORDER — SODIUM CHLORIDE, SODIUM GLUCONATE, SODIUM ACETATE, POTASSIUM CHLORIDE AND MAGNESIUM CHLORIDE 30; 37; 368; 526; 502 MG/100ML; MG/100ML; MG/100ML; MG/100ML; MG/100ML
INJECTION, SOLUTION INTRAVENOUS CONTINUOUS PRN
Status: DISCONTINUED | OUTPATIENT
Start: 2024-01-01 | End: 2024-01-01

## 2024-01-01 RX ORDER — CYCLOBENZAPRINE HCL 5 MG
5 TABLET ORAL 3 TIMES DAILY
Status: DISCONTINUED | OUTPATIENT
Start: 2024-01-01 | End: 2024-01-01

## 2024-01-01 RX ORDER — POTASSIUM CHLORIDE 29.8 MG/ML
INJECTION INTRAVENOUS
Status: COMPLETED
Start: 2024-01-01 | End: 2024-01-01

## 2024-01-01 RX ORDER — OXYCODONE HYDROCHLORIDE 5 MG/1
10 TABLET ORAL EVERY 6 HOURS
Status: DISCONTINUED | OUTPATIENT
Start: 2024-01-01 | End: 2024-01-01

## 2024-01-01 RX ORDER — ALBUMIN HUMAN 250 G/1000ML
12.5 SOLUTION INTRAVENOUS EVERY 6 HOURS
Status: COMPLETED | OUTPATIENT
Start: 2024-01-01 | End: 2024-01-01

## 2024-01-01 RX ORDER — CALCIUM GLUCONATE 20 MG/ML
1562 INJECTION, SOLUTION INTRAVENOUS ONCE
Status: DISCONTINUED | OUTPATIENT
Start: 2024-01-01 | End: 2024-01-01

## 2024-01-01 RX ORDER — TACROLIMUS 1 MG/1
4 CAPSULE ORAL EVERY 24 HOURS
Status: DISCONTINUED | OUTPATIENT
Start: 2024-01-01 | End: 2024-01-01

## 2024-01-01 RX ORDER — HYDRALAZINE HYDROCHLORIDE 100 MG/1
100 TABLET, FILM COATED ORAL EVERY 8 HOURS
Status: DISCONTINUED | OUTPATIENT
Start: 2024-01-01 | End: 2024-01-01

## 2024-01-01 RX ORDER — ROSUVASTATIN CALCIUM 40 MG/1
40 TABLET, COATED ORAL ONCE
Status: COMPLETED | OUTPATIENT
Start: 2024-01-01 | End: 2024-01-01

## 2024-01-01 RX ORDER — ALBUMIN HUMAN 50 G/1000ML
25 SOLUTION INTRAVENOUS ONCE
Status: COMPLETED | OUTPATIENT
Start: 2024-01-01 | End: 2024-01-01

## 2024-01-01 RX ORDER — INSULIN LISPRO 100 [IU]/ML
2 INJECTION, SOLUTION INTRAVENOUS; SUBCUTANEOUS ONCE
Status: COMPLETED | OUTPATIENT
Start: 2024-01-01 | End: 2024-01-01

## 2024-01-01 RX ORDER — HEPARIN SODIUM 10000 [USP'U]/100ML
0-4500 INJECTION, SOLUTION INTRAVENOUS CONTINUOUS
Status: DISCONTINUED | OUTPATIENT
Start: 2024-01-01 | End: 2024-01-01

## 2024-01-01 RX ORDER — ONDANSETRON HYDROCHLORIDE 2 MG/ML
4 INJECTION, SOLUTION INTRAVENOUS EVERY 4 HOURS PRN
Status: DISCONTINUED | OUTPATIENT
Start: 2024-01-01 | End: 2024-01-01 | Stop reason: HOSPADM

## 2024-01-01 RX ORDER — LIDOCAINE HYDROCHLORIDE 10 MG/ML
5 INJECTION, SOLUTION INFILTRATION; PERINEURAL ONCE
Status: DISCONTINUED | OUTPATIENT
Start: 2024-01-01 | End: 2024-01-01

## 2024-01-01 RX ORDER — SIROLIMUS 1 MG/ML
0.5 SOLUTION ORAL ONCE
Status: COMPLETED | OUTPATIENT
Start: 2024-01-01 | End: 2024-01-01

## 2024-01-01 RX ORDER — CALCIUM GLUCONATE 20 MG/ML
2 INJECTION, SOLUTION INTRAVENOUS ONCE
Status: DISCONTINUED | OUTPATIENT
Start: 2024-01-01 | End: 2024-01-01

## 2024-01-01 RX ORDER — MULTIVIT-MIN/IRON FUM/FOLIC AC 7.5 MG-4
1 TABLET ORAL DAILY
Status: DISCONTINUED | OUTPATIENT
Start: 2024-01-01 | End: 2024-01-01

## 2024-01-01 RX ORDER — MULTIVIT-MIN/FERROUS GLUCONATE 9 MG/15 ML
15 LIQUID (ML) ORAL DAILY
Status: DISCONTINUED | OUTPATIENT
Start: 2024-01-01 | End: 2024-01-01

## 2024-01-01 RX ORDER — TRAZODONE HYDROCHLORIDE 50 MG/1
25 TABLET ORAL NIGHTLY
Status: DISCONTINUED | OUTPATIENT
Start: 2024-01-01 | End: 2024-01-01

## 2024-01-01 RX ORDER — LEVOTHYROXINE SODIUM 200 UG/1
200 TABLET ORAL DAILY
Qty: 90 TABLET | Refills: 3 | Status: CANCELLED | OUTPATIENT
Start: 2024-01-01 | End: 2025-01-19

## 2024-01-01 RX ORDER — VANCOMYCIN HYDROCHLORIDE 750 MG/150ML
750 INJECTION, SOLUTION INTRAVENOUS EVERY 12 HOURS
Status: DISCONTINUED | OUTPATIENT
Start: 2024-01-01 | End: 2024-01-01 | Stop reason: HOSPADM

## 2024-01-01 RX ORDER — DIPHENHYDRAMINE HCL 25 MG
25 CAPSULE ORAL EVERY 6 HOURS PRN
Status: DISCONTINUED | OUTPATIENT
Start: 2024-01-01 | End: 2024-01-01

## 2024-01-01 RX ORDER — AMOXICILLIN 250 MG
2 CAPSULE ORAL 2 TIMES DAILY
Status: DISCONTINUED | OUTPATIENT
Start: 2024-01-01 | End: 2024-01-01 | Stop reason: HOSPADM

## 2024-01-01 RX ORDER — ONDANSETRON HYDROCHLORIDE 2 MG/ML
INJECTION, SOLUTION INTRAVENOUS
Status: COMPLETED
Start: 2024-01-01 | End: 2024-01-01

## 2024-01-01 RX ORDER — ESOMEPRAZOLE MAGNESIUM 40 MG/1
40 GRANULE, DELAYED RELEASE ORAL ONCE
Status: COMPLETED | OUTPATIENT
Start: 2024-01-01 | End: 2024-01-01

## 2024-01-01 RX ORDER — GUAIFENESIN 600 MG/1
600 TABLET, EXTENDED RELEASE ORAL 2 TIMES DAILY PRN
Status: DISCONTINUED | OUTPATIENT
Start: 2024-01-01 | End: 2024-01-01

## 2024-01-01 RX ORDER — SUCCINYLCHOLINE CHLORIDE 20 MG/ML
1 INJECTION INTRAMUSCULAR; INTRAVENOUS ONCE
Status: COMPLETED | OUTPATIENT
Start: 2024-01-01 | End: 2024-01-01

## 2024-01-01 RX ORDER — DOBUTAMINE HYDROCHLORIDE 400 MG/100ML
2.5-2 INJECTION INTRAVENOUS CONTINUOUS
Status: DISCONTINUED | OUTPATIENT
Start: 2024-01-01 | End: 2024-01-01

## 2024-01-01 RX ORDER — HYDROMORPHONE HYDROCHLORIDE 1 MG/ML
0.2 INJECTION, SOLUTION INTRAMUSCULAR; INTRAVENOUS; SUBCUTANEOUS
Status: CANCELLED | OUTPATIENT
Start: 2024-01-01

## 2024-01-01 RX ORDER — AMOXICILLIN 250 MG
2 CAPSULE ORAL 2 TIMES DAILY
Status: CANCELLED | OUTPATIENT
Start: 2024-01-01

## 2024-01-01 RX ORDER — DEXMEDETOMIDINE HYDROCHLORIDE 4 UG/ML
.1-1.5 INJECTION, SOLUTION INTRAVENOUS CONTINUOUS
Status: DISCONTINUED | OUTPATIENT
Start: 2024-01-01 | End: 2024-01-01 | Stop reason: HOSPADM

## 2024-01-01 RX ORDER — INSULIN LISPRO 100 [IU]/ML
0-10 INJECTION, SOLUTION INTRAVENOUS; SUBCUTANEOUS EVERY 4 HOURS
Status: DISCONTINUED | OUTPATIENT
Start: 2024-01-01 | End: 2024-01-01

## 2024-01-01 RX ORDER — EPINEPHRINE HCL IN 0.9 % NACL 4MG/250ML
PLASTIC BAG, INJECTION (ML) INTRAVENOUS CONTINUOUS PRN
Status: DISCONTINUED | OUTPATIENT
Start: 2024-01-01 | End: 2024-01-01

## 2024-01-01 RX ORDER — DEXTROSE MONOHYDRATE 50 MG/ML
25 INJECTION, SOLUTION INTRAVENOUS CONTINUOUS
Status: DISCONTINUED | OUTPATIENT
Start: 2024-01-01 | End: 2024-01-01

## 2024-01-01 RX ORDER — DOBUTAMINE HYDROCHLORIDE 400 MG/100ML
5 INJECTION INTRAVENOUS CONTINUOUS
Status: DISCONTINUED | OUTPATIENT
Start: 2024-01-01 | End: 2024-01-01

## 2024-01-01 RX ORDER — NOREPINEPHRINE BITARTRATE 0.03 MG/ML
INJECTION, SOLUTION INTRAVENOUS CONTINUOUS PRN
Status: DISCONTINUED | OUTPATIENT
Start: 2024-01-01 | End: 2024-01-01

## 2024-01-01 RX ORDER — HYDRALAZINE HYDROCHLORIDE 25 MG/1
25 TABLET, FILM COATED ORAL EVERY 8 HOURS
Status: DISCONTINUED | OUTPATIENT
Start: 2024-01-01 | End: 2024-01-01

## 2024-01-01 RX ORDER — HEPARIN SODIUM 10000 [USP'U]/100ML
INJECTION, SOLUTION INTRAVENOUS
Status: DISPENSED
Start: 2024-01-01 | End: 2024-01-01

## 2024-01-01 RX ORDER — GLYCOPYRROLATE 0.2 MG/ML
INJECTION INTRAMUSCULAR; INTRAVENOUS
Status: COMPLETED
Start: 2024-01-01 | End: 2024-01-01

## 2024-01-01 RX ORDER — FENTANYL CITRATE-0.9 % NACL/PF 10 MCG/ML
25-200 PLASTIC BAG, INJECTION (ML) INTRAVENOUS CONTINUOUS
Status: DISCONTINUED | OUTPATIENT
Start: 2024-01-01 | End: 2024-01-01 | Stop reason: HOSPADM

## 2024-01-01 RX ORDER — POTASSIUM CHLORIDE 14.9 MG/ML
INJECTION INTRAVENOUS
Status: DISPENSED
Start: 2024-01-01 | End: 2024-01-01

## 2024-01-01 RX ORDER — LORAZEPAM 2 MG/ML
0.5 INJECTION INTRAMUSCULAR ONCE
Status: COMPLETED | OUTPATIENT
Start: 2024-01-01 | End: 2024-01-01

## 2024-01-01 RX ORDER — PREDNISONE 10 MG/1
50 TABLET ORAL DAILY
Status: COMPLETED | OUTPATIENT
Start: 2024-01-01 | End: 2024-01-01

## 2024-01-01 RX ORDER — SODIUM CHLORIDE, SODIUM LACTATE, POTASSIUM CHLORIDE, CALCIUM CHLORIDE 600; 310; 30; 20 MG/100ML; MG/100ML; MG/100ML; MG/100ML
20 INJECTION, SOLUTION INTRAVENOUS CONTINUOUS
Status: DISCONTINUED | OUTPATIENT
Start: 2024-01-01 | End: 2024-01-01

## 2024-01-01 RX ORDER — HYDROMORPHONE HYDROCHLORIDE 1 MG/ML
0.2 INJECTION, SOLUTION INTRAMUSCULAR; INTRAVENOUS; SUBCUTANEOUS
Status: DISCONTINUED | OUTPATIENT
Start: 2024-01-01 | End: 2024-01-01 | Stop reason: HOSPADM

## 2024-01-01 RX ORDER — NAPROXEN SODIUM 220 MG/1
81 TABLET, FILM COATED ORAL DAILY
Status: DISCONTINUED | OUTPATIENT
Start: 2024-01-01 | End: 2024-01-01

## 2024-01-01 RX ORDER — TRAZODONE HYDROCHLORIDE 50 MG/1
TABLET ORAL
Status: COMPLETED
Start: 2024-01-01 | End: 2024-01-01

## 2024-01-01 RX ORDER — CALCIUM GLUCONATE 20 MG/ML
6167 INJECTION, SOLUTION INTRAVENOUS ONCE
Status: COMPLETED | OUTPATIENT
Start: 2024-01-01 | End: 2024-01-01

## 2024-01-01 RX ORDER — HEPARIN SODIUM 10000 [USP'U]/100ML
0-28 INJECTION, SOLUTION INTRAVENOUS CONTINUOUS
Status: DISCONTINUED | OUTPATIENT
Start: 2024-01-01 | End: 2024-01-01

## 2024-01-01 RX ORDER — VALGANCICLOVIR 450 MG/1
450 TABLET, FILM COATED ORAL DAILY
Status: DISCONTINUED | OUTPATIENT
Start: 2024-01-01 | End: 2024-01-01

## 2024-01-01 RX ORDER — ALBUMIN HUMAN 250 G/1000ML
25 SOLUTION INTRAVENOUS EVERY 8 HOURS
Status: DISCONTINUED | OUTPATIENT
Start: 2024-01-01 | End: 2024-01-01

## 2024-01-01 RX ORDER — DIPHENHYDRAMINE HCL 25 MG
25 CAPSULE ORAL ONCE
Status: CANCELLED | OUTPATIENT
Start: 2024-01-01

## 2024-01-01 RX ORDER — ACETAMINOPHEN 325 MG/1
650 TABLET ORAL EVERY 4 HOURS
Status: CANCELLED | OUTPATIENT
Start: 2024-01-01

## 2024-01-01 RX ORDER — HYDRALAZINE HYDROCHLORIDE 25 MG/1
25 TABLET, FILM COATED ORAL 3 TIMES DAILY
Status: DISCONTINUED | OUTPATIENT
Start: 2024-01-01 | End: 2024-01-01

## 2024-01-01 RX ORDER — CALCIUM GLUCONATE 20 MG/ML
5477 INJECTION, SOLUTION INTRAVENOUS ONCE
Status: COMPLETED | OUTPATIENT
Start: 2024-01-01 | End: 2024-01-01

## 2024-01-01 RX ORDER — NITROGLYCERIN 20 MG/100ML
5-200 INJECTION INTRAVENOUS CONTINUOUS
Status: DISCONTINUED | OUTPATIENT
Start: 2024-01-01 | End: 2024-01-01

## 2024-01-01 RX ORDER — FUROSEMIDE 10 MG/ML
80 INJECTION INTRAMUSCULAR; INTRAVENOUS ONCE
Status: DISCONTINUED | OUTPATIENT
Start: 2024-01-01 | End: 2024-01-01

## 2024-01-01 RX ORDER — NEOSTIGMINE METHYLSULFATE 1 MG/ML
4 INJECTION INTRAVENOUS ONCE
Status: COMPLETED | OUTPATIENT
Start: 2024-01-01 | End: 2024-01-01

## 2024-01-01 RX ORDER — ACETAMINOPHEN 325 MG/1
650 TABLET ORAL EVERY 6 HOURS PRN
Status: DISCONTINUED | OUTPATIENT
Start: 2024-01-01 | End: 2024-01-01 | Stop reason: HOSPADM

## 2024-01-01 RX ORDER — POTASSIUM CHLORIDE 29.8 MG/ML
40 INJECTION INTRAVENOUS ONCE
Status: COMPLETED | OUTPATIENT
Start: 2024-01-01 | End: 2024-01-01

## 2024-01-01 RX ORDER — LANOLIN ALCOHOL/MO/W.PET/CERES
800 CREAM (GRAM) TOPICAL DAILY
Status: DISCONTINUED | OUTPATIENT
Start: 2024-01-01 | End: 2024-01-01

## 2024-01-01 RX ORDER — ESOMEPRAZOLE MAGNESIUM 40 MG/1
40 GRANULE, DELAYED RELEASE ORAL
Status: DISCONTINUED | OUTPATIENT
Start: 2024-01-01 | End: 2024-01-01 | Stop reason: SDUPTHER

## 2024-01-01 RX ORDER — HYDROMORPHONE HYDROCHLORIDE 1 MG/ML
0.4 INJECTION, SOLUTION INTRAMUSCULAR; INTRAVENOUS; SUBCUTANEOUS EVERY 2 HOUR PRN
Status: DISCONTINUED | OUTPATIENT
Start: 2024-01-01 | End: 2024-01-01

## 2024-01-01 RX ORDER — LIDOCAINE HCL/PF 100 MG/5ML
SYRINGE (ML) INTRAVENOUS
Status: DISPENSED
Start: 2024-01-01 | End: 2024-01-01

## 2024-01-01 RX ORDER — SODIUM CHLORIDE, SODIUM LACTATE, POTASSIUM CHLORIDE, CALCIUM CHLORIDE 600; 310; 30; 20 MG/100ML; MG/100ML; MG/100ML; MG/100ML
INJECTION, SOLUTION INTRAVENOUS CONTINUOUS PRN
Status: DISCONTINUED | OUTPATIENT
Start: 2024-01-01 | End: 2024-01-01

## 2024-01-01 RX ORDER — SODIUM CHLORIDE, SODIUM LACTATE, POTASSIUM CHLORIDE, CALCIUM CHLORIDE 600; 310; 30; 20 MG/100ML; MG/100ML; MG/100ML; MG/100ML
30 INJECTION, SOLUTION INTRAVENOUS CONTINUOUS
Status: CANCELLED | OUTPATIENT
Start: 2024-01-01

## 2024-01-01 RX ORDER — VANCOMYCIN HYDROCHLORIDE 500 MG/100ML
500 INJECTION, SOLUTION INTRAVENOUS ONCE
Status: COMPLETED | OUTPATIENT
Start: 2024-01-01 | End: 2024-01-01

## 2024-01-01 RX ORDER — LACTULOSE 10 G/15ML
30 SOLUTION ORAL 2 TIMES DAILY
Status: DISCONTINUED | OUTPATIENT
Start: 2024-01-01 | End: 2024-01-01

## 2024-01-01 RX ADMIN — HYDROXYZINE HYDROCHLORIDE 25 MG: 25 TABLET, FILM COATED ORAL at 21:24

## 2024-01-01 RX ADMIN — ONDANSETRON 4 MG: 2 INJECTION INTRAMUSCULAR; INTRAVENOUS at 09:28

## 2024-01-01 RX ADMIN — FOLIC ACID 1 MG: 1 TABLET ORAL at 08:44

## 2024-01-01 RX ADMIN — NYSTATIN 500000 UNITS: 100000 SUSPENSION ORAL at 06:19

## 2024-01-01 RX ADMIN — DIPHENHYDRAMINE HYDROCHLORIDE 25 MG: 25 CAPSULE ORAL at 10:16

## 2024-01-01 RX ADMIN — NYSTATIN 500000 UNITS: 100000 SUSPENSION ORAL at 06:30

## 2024-01-01 RX ADMIN — SULFAMETHOXAZOLE AND TRIMETHOPRIM 80 MG OF TRIMETHOPRIM: 200; 40 SUSPENSION ORAL at 08:20

## 2024-01-01 RX ADMIN — PROPOFOL 50 MG: 10 INJECTION, EMULSION INTRAVENOUS at 02:40

## 2024-01-01 RX ADMIN — DIPHENHYDRAMINE HYDROCHLORIDE 25 MG: 25 CAPSULE ORAL at 11:00

## 2024-01-01 RX ADMIN — HEPARIN SODIUM 5000 UNITS: 5000 INJECTION INTRAVENOUS; SUBCUTANEOUS at 18:02

## 2024-01-01 RX ADMIN — Medication 1 TABLET: at 09:07

## 2024-01-01 RX ADMIN — CALCIUM GLUCONATE 1 G: 20 INJECTION, SOLUTION INTRAVENOUS at 04:05

## 2024-01-01 RX ADMIN — ONDANSETRON 4 MG: 2 INJECTION INTRAMUSCULAR; INTRAVENOUS at 02:35

## 2024-01-01 RX ADMIN — POTASSIUM CHLORIDE 20 MEQ: 14.9 INJECTION, SOLUTION INTRAVENOUS at 19:18

## 2024-01-01 RX ADMIN — PREDNISONE 10 MG: 10 TABLET ORAL at 09:35

## 2024-01-01 RX ADMIN — ACETAMINOPHEN 650 MG: 325 TABLET ORAL at 21:01

## 2024-01-01 RX ADMIN — FERROUS SULFATE TAB 325 MG (65 MG ELEMENTAL FE) 1 TABLET: 325 (65 FE) TAB at 10:00

## 2024-01-01 RX ADMIN — PIPERACILLIN SODIUM AND TAZOBACTAM SODIUM 4.5 G: 4; .5 INJECTION, SOLUTION INTRAVENOUS at 02:10

## 2024-01-01 RX ADMIN — FOLIC ACID 1 MG: 1 TABLET ORAL at 09:31

## 2024-01-01 RX ADMIN — ROCURONIUM BROMIDE 10 MG: 10 INJECTION, SOLUTION INTRAVENOUS at 20:44

## 2024-01-01 RX ADMIN — TACROLIMUS 0.5 MG: 0.5 CAPSULE ORAL at 02:53

## 2024-01-01 RX ADMIN — ACETAMINOPHEN 650 MG: 325 TABLET ORAL at 23:00

## 2024-01-01 RX ADMIN — PIPERACILLIN SODIUM AND TAZOBACTAM SODIUM 3.38 G: 3; .375 INJECTION, SOLUTION INTRAVENOUS at 18:11

## 2024-01-01 RX ADMIN — HYDROMORPHONE HYDROCHLORIDE 0.2 MG: 1 INJECTION, SOLUTION INTRAMUSCULAR; INTRAVENOUS; SUBCUTANEOUS at 12:57

## 2024-01-01 RX ADMIN — INSULIN LISPRO 10 UNITS: 100 INJECTION, SOLUTION INTRAVENOUS; SUBCUTANEOUS at 08:00

## 2024-01-01 RX ADMIN — INSULIN LISPRO 5 UNITS: 100 INJECTION, SOLUTION INTRAVENOUS; SUBCUTANEOUS at 12:18

## 2024-01-01 RX ADMIN — HEPARIN SODIUM 5000 UNITS: 5000 INJECTION INTRAVENOUS; SUBCUTANEOUS at 08:00

## 2024-01-01 RX ADMIN — VANCOMYCIN HYDROCHLORIDE 1000 MG: 1 INJECTION, SOLUTION INTRAVENOUS at 06:58

## 2024-01-01 RX ADMIN — ACETAMINOPHEN 650 MG: 325 TABLET ORAL at 13:43

## 2024-01-01 RX ADMIN — MYCOPHENOLIC ACID 360 MG: 360 TABLET, DELAYED RELEASE ORAL at 18:23

## 2024-01-01 RX ADMIN — PIPERACILLIN SODIUM AND TAZOBACTAM SODIUM 3.38 G: 3; .375 INJECTION, SOLUTION INTRAVENOUS at 05:04

## 2024-01-01 RX ADMIN — PANTOPRAZOLE SODIUM 40 MG: 40 INJECTION, POWDER, FOR SOLUTION INTRAVENOUS at 09:18

## 2024-01-01 RX ADMIN — LIDOCAINE 1 PATCH: 4 PATCH TOPICAL at 08:25

## 2024-01-01 RX ADMIN — NYSTATIN 500000 UNITS: 100000 SUSPENSION ORAL at 03:25

## 2024-01-01 RX ADMIN — NYSTATIN 500000 UNITS: 100000 SUSPENSION ORAL at 00:02

## 2024-01-01 RX ADMIN — ACETAMINOPHEN 975 MG: 325 TABLET ORAL at 08:56

## 2024-01-01 RX ADMIN — ONDANSETRON 4 MG: 2 INJECTION INTRAMUSCULAR; INTRAVENOUS at 16:29

## 2024-01-01 RX ADMIN — IMMUNE GLOBULIN INFUSION (HUMAN) 10 G: 100 INJECTION, SOLUTION INTRAVENOUS; SUBCUTANEOUS at 16:43

## 2024-01-01 RX ADMIN — FERROUS SULFATE TAB 325 MG (65 MG ELEMENTAL FE) 1 TABLET: 325 (65 FE) TAB at 06:49

## 2024-01-01 RX ADMIN — DIBASIC SODIUM PHOSPHATE, MONOBASIC POTASSIUM PHOSPHATE AND MONOBASIC SODIUM PHOSPHATE 500 MG: 852; 155; 130 TABLET ORAL at 13:20

## 2024-01-01 RX ADMIN — LIDOCAINE 1 PATCH: 4 PATCH TOPICAL at 08:19

## 2024-01-01 RX ADMIN — FERROUS SULFATE TAB 325 MG (65 MG ELEMENTAL FE) 1 TABLET: 325 (65 FE) TAB at 08:31

## 2024-01-01 RX ADMIN — ESOMEPRAZOLE MAGNESIUM 40 MG: 40 FOR SUSPENSION ORAL at 06:13

## 2024-01-01 RX ADMIN — ACETAMINOPHEN 650 MG: 650 SOLUTION ORAL at 21:00

## 2024-01-01 RX ADMIN — ONDANSETRON 4 MG: 2 INJECTION INTRAMUSCULAR; INTRAVENOUS at 12:05

## 2024-01-01 RX ADMIN — ASPIRIN 81 MG CHEWABLE TABLET 81 MG: 81 TABLET CHEWABLE at 08:23

## 2024-01-01 RX ADMIN — Medication 1 TABLET: at 09:46

## 2024-01-01 RX ADMIN — ONDANSETRON 4 MG: 2 INJECTION INTRAMUSCULAR; INTRAVENOUS at 21:03

## 2024-01-01 RX ADMIN — ALBUMIN HUMAN 12.5 G: 50 SOLUTION INTRAVENOUS at 01:14

## 2024-01-01 RX ADMIN — CALCIUM CHLORIDE, MAGNESIUM CHLORIDE, DEXTROSE MONOHYDRATE, LACTIC ACID, SODIUM CHLORIDE, SODIUM BICARBONATE AND POTASSIUM CHLORIDE 2400 ML/HR: 3.68; 3.05; 22; 5.4; 6.46; 3.09; .314 INJECTION INTRAVENOUS at 14:50

## 2024-01-01 RX ADMIN — NYSTATIN 500000 UNITS: 100000 SUSPENSION ORAL at 18:19

## 2024-01-01 RX ADMIN — LEVOTHYROXINE SODIUM 200 MCG: 0.2 TABLET ORAL at 04:08

## 2024-01-01 RX ADMIN — IRON SUCROSE 200 MG: 20 INJECTION, SOLUTION INTRAVENOUS at 06:08

## 2024-01-01 RX ADMIN — BIVALIRUDIN 0.11 MG/KG/HR: 250 INJECTION, POWDER, LYOPHILIZED, FOR SOLUTION INTRAVENOUS at 06:14

## 2024-01-01 RX ADMIN — PHYTONADIONE 10 MG: 10 INJECTION, EMULSION INTRAMUSCULAR; INTRAVENOUS; SUBCUTANEOUS at 09:06

## 2024-01-01 RX ADMIN — DIBASIC SODIUM PHOSPHATE, MONOBASIC POTASSIUM PHOSPHATE AND MONOBASIC SODIUM PHOSPHATE 500 MG: 852; 155; 130 TABLET ORAL at 01:09

## 2024-01-01 RX ADMIN — ONDANSETRON 4 MG: 2 INJECTION INTRAMUSCULAR; INTRAVENOUS at 18:47

## 2024-01-01 RX ADMIN — INSULIN LISPRO 2 UNITS: 100 INJECTION, SOLUTION INTRAVENOUS; SUBCUTANEOUS at 11:00

## 2024-01-01 RX ADMIN — Medication 1 TABLET: at 09:10

## 2024-01-01 RX ADMIN — PREDNISONE 60 MG: 10 TABLET ORAL at 10:34

## 2024-01-01 RX ADMIN — DEXTROSE MONOHYDRATE 0.02 MCG/KG/MIN: 50 INJECTION, SOLUTION INTRAVENOUS at 16:15

## 2024-01-01 RX ADMIN — TRAZODONE HYDROCHLORIDE 50 MG: 50 TABLET ORAL at 20:23

## 2024-01-01 RX ADMIN — CALCIUM GLUCONATE 1 G: 20 INJECTION, SOLUTION INTRAVENOUS at 21:29

## 2024-01-01 RX ADMIN — HYDROMORPHONE HYDROCHLORIDE 0.2 MG: 1 INJECTION, SOLUTION INTRAMUSCULAR; INTRAVENOUS; SUBCUTANEOUS at 08:10

## 2024-01-01 RX ADMIN — LIDOCAINE 1 PATCH: 4 PATCH TOPICAL at 08:10

## 2024-01-01 RX ADMIN — CALCIUM CHLORIDE, MAGNESIUM CHLORIDE, DEXTROSE MONOHYDRATE, LACTIC ACID, SODIUM CHLORIDE, SODIUM BICARBONATE AND POTASSIUM CHLORIDE 2400 ML/HR: 3.68; 3.05; 22; 5.4; 6.46; 3.09; .314 INJECTION INTRAVENOUS at 18:28

## 2024-01-01 RX ADMIN — TACROLIMUS 1.5 MG: 1 CAPSULE ORAL at 06:17

## 2024-01-01 RX ADMIN — Medication 0.04 MCG/KG/MIN: at 19:14

## 2024-01-01 RX ADMIN — PREDNISONE 50 MG: 10 TABLET ORAL at 10:58

## 2024-01-01 RX ADMIN — DIBASIC SODIUM PHOSPHATE, MONOBASIC POTASSIUM PHOSPHATE AND MONOBASIC SODIUM PHOSPHATE 500 MG: 852; 155; 130 TABLET ORAL at 06:26

## 2024-01-01 RX ADMIN — PROPOFOL 40 MCG/KG/MIN: 10 INJECTION, EMULSION INTRAVENOUS at 06:42

## 2024-01-01 RX ADMIN — FLUCONAZOLE 200 MG: 200 TABLET ORAL at 11:30

## 2024-01-01 RX ADMIN — ISOSORBIDE DINITRATE 40 MG: 10 TABLET ORAL at 00:52

## 2024-01-01 RX ADMIN — INSULIN HUMAN 1 UNITS: 100 INJECTION, SOLUTION PARENTERAL at 18:44

## 2024-01-01 RX ADMIN — CEPHALEXIN 500 MG: 500 CAPSULE ORAL at 00:47

## 2024-01-01 RX ADMIN — TACROLIMUS 2 MG: 1 CAPSULE ORAL at 06:47

## 2024-01-01 RX ADMIN — NYSTATIN 500000 UNITS: 100000 SUSPENSION ORAL at 21:24

## 2024-01-01 RX ADMIN — NYSTATIN 500000 UNITS: 100000 SUSPENSION ORAL at 06:54

## 2024-01-01 RX ADMIN — INSULIN LISPRO 2 UNITS: 100 INJECTION, SOLUTION INTRAVENOUS; SUBCUTANEOUS at 16:21

## 2024-01-01 RX ADMIN — Medication 1 TABLET: at 08:42

## 2024-01-01 RX ADMIN — PREDNISONE 10 MG: 10 TABLET ORAL at 08:30

## 2024-01-01 RX ADMIN — DEXTROSE MONOHYDRATE 0.04 MCG/KG/MIN: 50 INJECTION, SOLUTION INTRAVENOUS at 20:27

## 2024-01-01 RX ADMIN — CYANOCOBALAMIN TAB 1000 MCG 500 MCG: 1000 TAB at 09:35

## 2024-01-01 RX ADMIN — HYDROMORPHONE HYDROCHLORIDE 0.4 MG: 1 INJECTION, SOLUTION INTRAMUSCULAR; INTRAVENOUS; SUBCUTANEOUS at 20:54

## 2024-01-01 RX ADMIN — SIROLIMUS 2.5 MG: 1 TABLET ORAL at 08:21

## 2024-01-01 RX ADMIN — PANTOPRAZOLE SODIUM 40 MG: 40 TABLET, DELAYED RELEASE ORAL at 08:25

## 2024-01-01 RX ADMIN — INSULIN LISPRO 2 UNITS: 100 INJECTION, SOLUTION INTRAVENOUS; SUBCUTANEOUS at 14:01

## 2024-01-01 RX ADMIN — CEPHALEXIN 500 MG: 250 FOR SUSPENSION ORAL at 20:31

## 2024-01-01 RX ADMIN — MYCOPHENOLIC ACID 180 MG: 180 TABLET, DELAYED RELEASE ORAL at 06:17

## 2024-01-01 RX ADMIN — METHOCARBAMOL 500 MG: 500 TABLET ORAL at 22:16

## 2024-01-01 RX ADMIN — NYSTATIN 500000 UNITS: 100000 SUSPENSION ORAL at 12:34

## 2024-01-01 RX ADMIN — HYDRALAZINE HYDROCHLORIDE 100 MG: 100 TABLET, FILM COATED ORAL at 17:20

## 2024-01-01 RX ADMIN — ONDANSETRON 4 MG: 2 INJECTION INTRAMUSCULAR; INTRAVENOUS at 11:48

## 2024-01-01 RX ADMIN — OXYCODONE HYDROCHLORIDE 5 MG: 5 TABLET ORAL at 19:32

## 2024-01-01 RX ADMIN — ONDANSETRON 4 MG: 2 INJECTION INTRAMUSCULAR; INTRAVENOUS at 08:02

## 2024-01-01 RX ADMIN — LEVOTHYROXINE SODIUM ANHYDROUS 90 MCG: 100 INJECTION, POWDER, LYOPHILIZED, FOR SOLUTION INTRAVENOUS at 09:30

## 2024-01-01 RX ADMIN — FOLIC ACID 1 MG: 1 TABLET ORAL at 09:35

## 2024-01-01 RX ADMIN — PROPOFOL 40 MCG/KG/MIN: 10 INJECTION, EMULSION INTRAVENOUS at 02:36

## 2024-01-01 RX ADMIN — CYANOCOBALAMIN TAB 1000 MCG 500 MCG: 1000 TAB at 08:10

## 2024-01-01 RX ADMIN — EPINEPHRINE 0.03 MCG/KG/MIN: 1 INJECTION INTRAMUSCULAR; INTRAVENOUS; SUBCUTANEOUS at 11:30

## 2024-01-01 RX ADMIN — Medication 1 TABLET: at 08:05

## 2024-01-01 RX ADMIN — LORAZEPAM 0.5 MG: 2 INJECTION, SOLUTION INTRAMUSCULAR; INTRAVENOUS at 02:40

## 2024-01-01 RX ADMIN — ROSUVASTATIN 40 MG: 40 TABLET, FILM COATED ORAL at 20:02

## 2024-01-01 RX ADMIN — DEXMEDETOMIDINE HYDROCHLORIDE 0.2 MCG/KG/HR: 400 INJECTION INTRAVENOUS at 01:57

## 2024-01-01 RX ADMIN — ACETAMINOPHEN 650 MG: 325 TABLET ORAL at 22:29

## 2024-01-01 RX ADMIN — DEXTROSE MONOHYDRATE 0.02 MCG/KG/MIN: 50 INJECTION, SOLUTION INTRAVENOUS at 22:46

## 2024-01-01 RX ADMIN — SALINE NASAL SPRAY 5 SPRAY: 1.5 SOLUTION NASAL at 21:00

## 2024-01-01 RX ADMIN — ACETAMINOPHEN 650 MG: 325 TABLET ORAL at 14:35

## 2024-01-01 RX ADMIN — PSYLLIUM HUSK 1 PACKET: 3.4 POWDER ORAL at 08:10

## 2024-01-01 RX ADMIN — Medication 2 G: at 08:18

## 2024-01-01 RX ADMIN — HEPARIN SODIUM 1200 UNITS: 1000 INJECTION INTRAVENOUS; SUBCUTANEOUS at 13:03

## 2024-01-01 RX ADMIN — SIROLIMUS 3 MG: 2 TABLET ORAL at 06:36

## 2024-01-01 RX ADMIN — METHOCARBAMOL 500 MG: 500 TABLET ORAL at 08:49

## 2024-01-01 RX ADMIN — ACETAMINOPHEN 975 MG: 325 TABLET ORAL at 03:33

## 2024-01-01 RX ADMIN — DEXMEDETOMIDINE HYDROCHLORIDE 0.2 MCG/KG/HR: 400 INJECTION INTRAVENOUS at 22:10

## 2024-01-01 RX ADMIN — PREDNISONE 10 MG: 10 TABLET ORAL at 09:20

## 2024-01-01 RX ADMIN — CALCIUM GLUCONATE 1 G: 20 INJECTION, SOLUTION INTRAVENOUS at 17:40

## 2024-01-01 RX ADMIN — PIPERACILLIN SODIUM AND TAZOBACTAM SODIUM 3.38 G: 3; .375 INJECTION, SOLUTION INTRAVENOUS at 12:11

## 2024-01-01 RX ADMIN — SODIUM CHLORIDE, POTASSIUM CHLORIDE, SODIUM LACTATE AND CALCIUM CHLORIDE 5 ML/HR: 600; 310; 30; 20 INJECTION, SOLUTION INTRAVENOUS at 21:07

## 2024-01-01 RX ADMIN — HYDRALAZINE HYDROCHLORIDE 100 MG: 100 TABLET, FILM COATED ORAL at 00:16

## 2024-01-01 RX ADMIN — ACETAMINOPHEN 650 MG: 325 TABLET ORAL at 09:15

## 2024-01-01 RX ADMIN — NYSTATIN 500000 UNITS: 100000 SUSPENSION ORAL at 06:33

## 2024-01-01 RX ADMIN — SIROLIMUS 1 MG: 1 SOLUTION ORAL at 09:36

## 2024-01-01 RX ADMIN — INSULIN LISPRO 2 UNITS: 100 INJECTION, SOLUTION INTRAVENOUS; SUBCUTANEOUS at 16:52

## 2024-01-01 RX ADMIN — CALCITRIOL 0.5 MCG: 1 SOLUTION ORAL at 09:22

## 2024-01-01 RX ADMIN — HYDROMORPHONE HYDROCHLORIDE 0.2 MG: 1 INJECTION, SOLUTION INTRAMUSCULAR; INTRAVENOUS; SUBCUTANEOUS at 15:27

## 2024-01-01 RX ADMIN — FOLIC ACID 1 MG: 1 TABLET ORAL at 18:09

## 2024-01-01 RX ADMIN — HEPARIN SODIUM 5000 UNITS: 5000 INJECTION INTRAVENOUS; SUBCUTANEOUS at 08:21

## 2024-01-01 RX ADMIN — HYDRALAZINE HYDROCHLORIDE 5 MG: 20 INJECTION INTRAMUSCULAR; INTRAVENOUS at 20:48

## 2024-01-01 RX ADMIN — HYDROMORPHONE HYDROCHLORIDE 0.4 MG: 1 INJECTION, SOLUTION INTRAMUSCULAR; INTRAVENOUS; SUBCUTANEOUS at 08:15

## 2024-01-01 RX ADMIN — Medication 10 MG: at 20:35

## 2024-01-01 RX ADMIN — DIBASIC SODIUM PHOSPHATE, MONOBASIC POTASSIUM PHOSPHATE AND MONOBASIC SODIUM PHOSPHATE 500 MG: 852; 155; 130 TABLET ORAL at 17:00

## 2024-01-01 RX ADMIN — CALCIUM GLUCONATE 1 G: 20 INJECTION, SOLUTION INTRAVENOUS at 17:01

## 2024-01-01 RX ADMIN — TACROLIMUS 4 MG: 1 CAPSULE ORAL at 06:13

## 2024-01-01 RX ADMIN — TACROLIMUS 2 MG: 1 CAPSULE ORAL at 06:05

## 2024-01-01 RX ADMIN — Medication 15 ML: at 08:22

## 2024-01-01 RX ADMIN — OXYCODONE HYDROCHLORIDE 5 MG: 5 TABLET ORAL at 15:10

## 2024-01-01 RX ADMIN — DIBASIC SODIUM PHOSPHATE, MONOBASIC POTASSIUM PHOSPHATE AND MONOBASIC SODIUM PHOSPHATE 500 MG: 852; 155; 130 TABLET ORAL at 18:08

## 2024-01-01 RX ADMIN — HYDROMORPHONE HYDROCHLORIDE 0.4 MG: 1 INJECTION, SOLUTION INTRAMUSCULAR; INTRAVENOUS; SUBCUTANEOUS at 13:19

## 2024-01-01 RX ADMIN — ONDANSETRON 4 MG: 2 INJECTION INTRAMUSCULAR; INTRAVENOUS at 04:27

## 2024-01-01 RX ADMIN — LIDOCAINE 1 PATCH: 4 PATCH TOPICAL at 09:17

## 2024-01-01 RX ADMIN — CALCITRIOL 0.5 MCG: 0.5 CAPSULE ORAL at 08:05

## 2024-01-01 RX ADMIN — PREDNISONE 10 MG: 10 TABLET ORAL at 08:58

## 2024-01-01 RX ADMIN — HEPARIN SODIUM 5000 UNITS: 5000 INJECTION INTRAVENOUS; SUBCUTANEOUS at 01:10

## 2024-01-01 RX ADMIN — SIROLIMUS 2 MG: 1 TABLET ORAL at 06:11

## 2024-01-01 RX ADMIN — Medication 0.05 MCG/KG/MIN: at 14:30

## 2024-01-01 RX ADMIN — PROPOFOL 50 MCG/KG/MIN: 10 INJECTION, EMULSION INTRAVENOUS at 03:56

## 2024-01-01 RX ADMIN — DIBASIC SODIUM PHOSPHATE, MONOBASIC POTASSIUM PHOSPHATE AND MONOBASIC SODIUM PHOSPHATE 500 MG: 852; 155; 130 TABLET ORAL at 16:56

## 2024-01-01 RX ADMIN — CLEVIPIDINE 2 MG/HR: 0.5 EMULSION INTRAVENOUS at 02:18

## 2024-01-01 RX ADMIN — ACETAMINOPHEN 650 MG: 325 TABLET ORAL at 16:42

## 2024-01-01 RX ADMIN — Medication 1 TABLET: at 08:27

## 2024-01-01 RX ADMIN — NYSTATIN 500000 UNITS: 100000 SUSPENSION ORAL at 06:22

## 2024-01-01 RX ADMIN — HYDRALAZINE HYDROCHLORIDE 25 MG: 25 TABLET ORAL at 15:05

## 2024-01-01 RX ADMIN — QUETIAPINE 50 MG: 25 TABLET ORAL at 20:52

## 2024-01-01 RX ADMIN — SALINE NASAL SPRAY 5 SPRAY: 1.5 SOLUTION NASAL at 13:00

## 2024-01-01 RX ADMIN — SULFAMETHOXAZOLE AND TRIMETHOPRIM 80 MG OF TRIMETHOPRIM: 200; 40 SUSPENSION ORAL at 09:12

## 2024-01-01 RX ADMIN — THIAMINE HYDROCHLORIDE 500 MG: 100 INJECTION, SOLUTION INTRAMUSCULAR; INTRAVENOUS at 14:38

## 2024-01-01 RX ADMIN — TACROLIMUS 4 MG: 1 CAPSULE ORAL at 06:15

## 2024-01-01 RX ADMIN — LEVOTHYROXINE SODIUM 250 MCG: 0.12 TABLET ORAL at 04:02

## 2024-01-01 RX ADMIN — CALCIUM GLUCONATE 1 G: 20 INJECTION, SOLUTION INTRAVENOUS at 05:47

## 2024-01-01 RX ADMIN — Medication 10 MG: at 21:13

## 2024-01-01 RX ADMIN — Medication 1 TABLET: at 09:18

## 2024-01-01 RX ADMIN — ONDANSETRON 4 MG: 2 INJECTION INTRAMUSCULAR; INTRAVENOUS at 08:12

## 2024-01-01 RX ADMIN — ISOSORBIDE DINITRATE 10 MG: 10 TABLET ORAL at 10:42

## 2024-01-01 RX ADMIN — ONDANSETRON 4 MG: 2 INJECTION INTRAMUSCULAR; INTRAVENOUS at 08:00

## 2024-01-01 RX ADMIN — PREDNISONE 10 MG: 10 TABLET ORAL at 09:10

## 2024-01-01 RX ADMIN — Medication 0.5 MG: at 18:27

## 2024-01-01 RX ADMIN — INSULIN LISPRO 4 UNITS: 100 INJECTION, SOLUTION INTRAVENOUS; SUBCUTANEOUS at 10:01

## 2024-01-01 RX ADMIN — MIDAZOLAM HYDROCHLORIDE 1 MG: 1 INJECTION, SOLUTION INTRAMUSCULAR; INTRAVENOUS at 18:27

## 2024-01-01 RX ADMIN — CALCITRIOL 0.5 MCG: 1 SOLUTION ORAL at 08:38

## 2024-01-01 RX ADMIN — FERROUS SULFATE TAB 325 MG (65 MG ELEMENTAL FE) 1 TABLET: 325 (65 FE) TAB at 09:34

## 2024-01-01 RX ADMIN — DEXMEDETOMIDINE HYDROCHLORIDE 0.2 MCG/KG/HR: 400 INJECTION INTRAVENOUS at 00:07

## 2024-01-01 RX ADMIN — INSULIN LISPRO 4 UNITS: 100 INJECTION, SOLUTION INTRAVENOUS; SUBCUTANEOUS at 13:29

## 2024-01-01 RX ADMIN — HEPARIN SODIUM 5000 UNITS: 5000 INJECTION INTRAVENOUS; SUBCUTANEOUS at 00:30

## 2024-01-01 RX ADMIN — INSULIN LISPRO 10 UNITS: 100 INJECTION, SOLUTION INTRAVENOUS; SUBCUTANEOUS at 21:22

## 2024-01-01 RX ADMIN — HYDRALAZINE HYDROCHLORIDE 75 MG: 50 TABLET ORAL at 15:44

## 2024-01-01 RX ADMIN — INSULIN LISPRO 2 UNITS: 100 INJECTION, SOLUTION INTRAVENOUS; SUBCUTANEOUS at 08:26

## 2024-01-01 RX ADMIN — NYSTATIN 500000 UNITS: 100000 SUSPENSION ORAL at 18:08

## 2024-01-01 RX ADMIN — PREDNISONE 10 MG: 10 TABLET ORAL at 09:25

## 2024-01-01 RX ADMIN — EPINEPHRINE 0.02 MCG/KG/MIN: 1 INJECTION INTRAMUSCULAR; INTRAVENOUS; SUBCUTANEOUS at 21:10

## 2024-01-01 RX ADMIN — ROCURONIUM 20 MG: 50 INJECTION, SOLUTION INTRAVENOUS at 17:40

## 2024-01-01 RX ADMIN — NYSTATIN 500000 UNITS: 100000 SUSPENSION ORAL at 12:37

## 2024-01-01 RX ADMIN — PIPERACILLIN SODIUM AND TAZOBACTAM SODIUM 4.5 G: 4; .5 INJECTION, SOLUTION INTRAVENOUS at 21:12

## 2024-01-01 RX ADMIN — ISOSORBIDE DINITRATE 20 MG: 20 TABLET ORAL at 00:32

## 2024-01-01 RX ADMIN — ONDANSETRON 4 MG: 2 INJECTION INTRAMUSCULAR; INTRAVENOUS at 02:27

## 2024-01-01 RX ADMIN — Medication 8 MG/HR: at 20:14

## 2024-01-01 RX ADMIN — HYDROMORPHONE HYDROCHLORIDE 0.2 MG: 1 INJECTION, SOLUTION INTRAMUSCULAR; INTRAVENOUS; SUBCUTANEOUS at 17:15

## 2024-01-01 RX ADMIN — FUROSEMIDE 40 MG: 10 INJECTION, SOLUTION INTRAMUSCULAR; INTRAVENOUS at 17:10

## 2024-01-01 RX ADMIN — HEPARIN SODIUM 5000 UNITS: 5000 INJECTION INTRAVENOUS; SUBCUTANEOUS at 07:39

## 2024-01-01 RX ADMIN — Medication: at 20:00

## 2024-01-01 RX ADMIN — INSULIN LISPRO 1 UNITS: 100 INJECTION, SOLUTION INTRAVENOUS; SUBCUTANEOUS at 18:50

## 2024-01-01 RX ADMIN — Medication 1 TABLET: at 08:04

## 2024-01-01 RX ADMIN — PANTOPRAZOLE SODIUM 40 MG: 40 TABLET, DELAYED RELEASE ORAL at 06:23

## 2024-01-01 RX ADMIN — HYDROMORPHONE HYDROCHLORIDE 0.2 MG: 1 INJECTION, SOLUTION INTRAMUSCULAR; INTRAVENOUS; SUBCUTANEOUS at 06:16

## 2024-01-01 RX ADMIN — MAGNESIUM SULFATE IN DEXTROSE 1 G: 10 INJECTION, SOLUTION INTRAVENOUS at 09:44

## 2024-01-01 RX ADMIN — Medication 1 TABLET: at 10:56

## 2024-01-01 RX ADMIN — ACETAMINOPHEN 650 MG: 650 SOLUTION ORAL at 03:10

## 2024-01-01 RX ADMIN — HEPARIN SODIUM 5000 UNITS: 5000 INJECTION INTRAVENOUS; SUBCUTANEOUS at 00:16

## 2024-01-01 RX ADMIN — MYCOPHENOLIC ACID 180 MG: 180 TABLET, DELAYED RELEASE ORAL at 09:49

## 2024-01-01 RX ADMIN — FOLIC ACID 1 MG: 1 TABLET ORAL at 09:11

## 2024-01-01 RX ADMIN — NYSTATIN 500000 UNITS: 100000 SUSPENSION ORAL at 00:55

## 2024-01-01 RX ADMIN — Medication 2 MG: at 18:33

## 2024-01-01 RX ADMIN — VALGANCICLOVIR HYDROCHLORIDE 450 MG: 450 TABLET ORAL at 08:18

## 2024-01-01 RX ADMIN — ONDANSETRON 4 MG: 2 INJECTION INTRAMUSCULAR; INTRAVENOUS at 08:16

## 2024-01-01 RX ADMIN — DOCUSATE SODIUM 100 MG: 100 CAPSULE, LIQUID FILLED ORAL at 09:48

## 2024-01-01 RX ADMIN — LIDOCAINE 1 PATCH: 4 PATCH TOPICAL at 09:32

## 2024-01-01 RX ADMIN — HYDROMORPHONE HYDROCHLORIDE 0.2 MG: 1 INJECTION, SOLUTION INTRAMUSCULAR; INTRAVENOUS; SUBCUTANEOUS at 15:59

## 2024-01-01 RX ADMIN — CALCITRIOL 0.5 MCG: 1 SOLUTION ORAL at 09:06

## 2024-01-01 RX ADMIN — DIBASIC SODIUM PHOSPHATE, MONOBASIC POTASSIUM PHOSPHATE AND MONOBASIC SODIUM PHOSPHATE 500 MG: 852; 155; 130 TABLET ORAL at 12:28

## 2024-01-01 RX ADMIN — VALGANCICLOVIR HYDROCHLORIDE 450 MG: 450 TABLET ORAL at 14:12

## 2024-01-01 RX ADMIN — CALCITRIOL 0.5 MCG: 1 SOLUTION ORAL at 08:22

## 2024-01-01 RX ADMIN — HEPARIN SODIUM 1400 UNITS: 1000 INJECTION, SOLUTION INTRAVENOUS; SUBCUTANEOUS at 23:25

## 2024-01-01 RX ADMIN — SIROLIMUS 1 MG: 1 TABLET ORAL at 09:47

## 2024-01-01 RX ADMIN — PANTOPRAZOLE SODIUM 40 MG: 40 TABLET, DELAYED RELEASE ORAL at 06:22

## 2024-01-01 RX ADMIN — ONDANSETRON 4 MG: 2 INJECTION INTRAMUSCULAR; INTRAVENOUS at 21:13

## 2024-01-01 RX ADMIN — DIBASIC SODIUM PHOSPHATE, MONOBASIC POTASSIUM PHOSPHATE AND MONOBASIC SODIUM PHOSPHATE 500 MG: 852; 155; 130 TABLET ORAL at 06:07

## 2024-01-01 RX ADMIN — HEPARIN SODIUM 5000 UNITS: 5000 INJECTION INTRAVENOUS; SUBCUTANEOUS at 23:38

## 2024-01-01 RX ADMIN — PERFLUTREN 10 ML OF DILUTION: 6.52 INJECTION, SUSPENSION INTRAVENOUS at 15:21

## 2024-01-01 RX ADMIN — Medication 10 MG: at 18:19

## 2024-01-01 RX ADMIN — HYDRALAZINE HYDROCHLORIDE 5 MG: 20 INJECTION INTRAMUSCULAR; INTRAVENOUS at 11:48

## 2024-01-01 RX ADMIN — HEPARIN SODIUM 400 UNITS/HR: 10000 INJECTION, SOLUTION INTRAVENOUS at 05:42

## 2024-01-01 RX ADMIN — ROCURONIUM BROMIDE 20 MG: 10 INJECTION, SOLUTION INTRAVENOUS at 20:24

## 2024-01-01 RX ADMIN — HEPARIN SODIUM 5000 UNITS: 5000 INJECTION INTRAVENOUS; SUBCUTANEOUS at 15:38

## 2024-01-01 RX ADMIN — CALCITRIOL 0.5 MCG: 0.5 CAPSULE ORAL at 09:47

## 2024-01-01 RX ADMIN — HYDROXYZINE HYDROCHLORIDE 25 MG: 25 TABLET, FILM COATED ORAL at 15:22

## 2024-01-01 RX ADMIN — ROSUVASTATIN 40 MG: 40 TABLET, FILM COATED ORAL at 20:04

## 2024-01-01 RX ADMIN — LEVOTHYROXINE SODIUM 200 MCG: 200 TABLET ORAL at 06:38

## 2024-01-01 RX ADMIN — ETOMIDATE 20 MG: 2 INJECTION INTRAVENOUS at 12:47

## 2024-01-01 RX ADMIN — OXYCODONE HYDROCHLORIDE 5 MG: 5 SOLUTION ORAL at 13:07

## 2024-01-01 RX ADMIN — SALINE NASAL SPRAY 1 SPRAY: 1.5 SOLUTION NASAL at 09:08

## 2024-01-01 RX ADMIN — TACROLIMUS 3.5 MG: 1 CAPSULE ORAL at 18:10

## 2024-01-01 RX ADMIN — CALCIUM CHLORIDE, MAGNESIUM CHLORIDE, DEXTROSE MONOHYDRATE, LACTIC ACID, SODIUM CHLORIDE, SODIUM BICARBONATE AND POTASSIUM CHLORIDE 2400 ML/HR: 3.68; 3.05; 22; 5.4; 6.46; 3.09; .314 INJECTION INTRAVENOUS at 15:16

## 2024-01-01 RX ADMIN — TACROLIMUS 2 MG: 1 CAPSULE ORAL at 19:00

## 2024-01-01 RX ADMIN — ASPIRIN 81 MG CHEWABLE TABLET 81 MG: 81 TABLET CHEWABLE at 07:40

## 2024-01-01 RX ADMIN — LIDOCAINE 1 PATCH: 4 PATCH TOPICAL at 08:41

## 2024-01-01 RX ADMIN — FOLIC ACID 1 MG: 1 TABLET ORAL at 08:19

## 2024-01-01 RX ADMIN — METHOCARBAMOL 500 MG: 500 TABLET ORAL at 17:25

## 2024-01-01 RX ADMIN — ONDANSETRON 4 MG: 2 INJECTION INTRAMUSCULAR; INTRAVENOUS at 21:08

## 2024-01-01 RX ADMIN — PROPOFOL 10 MG: 10 INJECTION, EMULSION INTRAVENOUS at 12:07

## 2024-01-01 RX ADMIN — PIPERACILLIN SODIUM AND TAZOBACTAM SODIUM 3.38 G: 3; .375 INJECTION, SOLUTION INTRAVENOUS at 01:09

## 2024-01-01 RX ADMIN — INSULIN HUMAN 3 UNITS: 100 INJECTION, SOLUTION PARENTERAL at 01:40

## 2024-01-01 RX ADMIN — DIBASIC SODIUM PHOSPHATE, MONOBASIC POTASSIUM PHOSPHATE AND MONOBASIC SODIUM PHOSPHATE 500 MG: 852; 155; 130 TABLET ORAL at 06:21

## 2024-01-01 RX ADMIN — ONDANSETRON 4 MG: 2 INJECTION INTRAMUSCULAR; INTRAVENOUS at 22:35

## 2024-01-01 RX ADMIN — NYSTATIN 500000 UNITS: 100000 SUSPENSION ORAL at 12:15

## 2024-01-01 RX ADMIN — LEVOTHYROXINE SODIUM 200 MCG: 200 TABLET ORAL at 06:32

## 2024-01-01 RX ADMIN — ACETAMINOPHEN 650 MG: 325 TABLET ORAL at 08:49

## 2024-01-01 RX ADMIN — CALCITRIOL 0.5 MCG: 0.5 CAPSULE ORAL at 09:59

## 2024-01-01 RX ADMIN — PREDNISONE 10 MG: 10 TABLET ORAL at 09:24

## 2024-01-01 RX ADMIN — ALBUMIN HUMAN 12.5 G: 0.25 SOLUTION INTRAVENOUS at 23:27

## 2024-01-01 RX ADMIN — Medication 2 G: at 11:57

## 2024-01-01 RX ADMIN — DIGOXIN 125 MCG: 125 TABLET ORAL at 12:55

## 2024-01-01 RX ADMIN — MILRINONE LACTATE 0.25 MCG/KG/MIN: 200 INJECTION, SOLUTION INTRAVENOUS at 01:59

## 2024-01-01 RX ADMIN — ONDANSETRON 4 MG: 2 INJECTION INTRAMUSCULAR; INTRAVENOUS at 01:25

## 2024-01-01 RX ADMIN — PROPOFOL 10 MG: 10 INJECTION, EMULSION INTRAVENOUS at 12:02

## 2024-01-01 RX ADMIN — SODIUM NITROPRUSSIDE 1.4 MCG/KG/MIN: 0.5 INJECTION, SOLUTION INTRAVENOUS at 08:00

## 2024-01-01 RX ADMIN — ONDANSETRON 4 MG: 2 INJECTION INTRAMUSCULAR; INTRAVENOUS at 23:09

## 2024-01-01 RX ADMIN — CALCITRIOL 0.5 MCG: 1 SOLUTION ORAL at 09:37

## 2024-01-01 RX ADMIN — MILRINONE LACTATE 0.25 MCG/KG/MIN: 200 INJECTION, SOLUTION INTRAVENOUS at 01:49

## 2024-01-01 RX ADMIN — OXYCODONE HYDROCHLORIDE 5 MG: 5 TABLET ORAL at 21:15

## 2024-01-01 RX ADMIN — SENNOSIDES AND DOCUSATE SODIUM 1 TABLET: 8.6; 5 TABLET ORAL at 08:03

## 2024-01-01 RX ADMIN — TRAZODONE HYDROCHLORIDE 25 MG: 50 TABLET ORAL at 21:15

## 2024-01-01 RX ADMIN — CALCIUM GLUCONATE 2 G: 20 INJECTION, SOLUTION INTRAVENOUS at 21:30

## 2024-01-01 RX ADMIN — TACROLIMUS 2.5 MG: 1 CAPSULE ORAL at 17:46

## 2024-01-01 RX ADMIN — LEVOTHYROXINE SODIUM 200 MCG: 0.2 TABLET ORAL at 04:18

## 2024-01-01 RX ADMIN — LEVOTHYROXINE SODIUM 200 MCG: 200 TABLET ORAL at 06:15

## 2024-01-01 RX ADMIN — OXYCODONE HYDROCHLORIDE 5 MG: 5 TABLET ORAL at 09:08

## 2024-01-01 RX ADMIN — OXYMETAZOLINE HYDROCHLORIDE 2 SPRAY: 0.05 SPRAY NASAL at 15:02

## 2024-01-01 RX ADMIN — HYDROMORPHONE HYDROCHLORIDE 0.4 MG: 1 INJECTION, SOLUTION INTRAMUSCULAR; INTRAVENOUS; SUBCUTANEOUS at 01:16

## 2024-01-01 RX ADMIN — LEVOTHYROXINE SODIUM 200 MCG: 0.2 TABLET ORAL at 11:57

## 2024-01-01 RX ADMIN — ISOSORBIDE DINITRATE 40 MG: 10 TABLET ORAL at 08:52

## 2024-01-01 RX ADMIN — Medication 1 TABLET: at 09:19

## 2024-01-01 RX ADMIN — HYDRALAZINE HYDROCHLORIDE 100 MG: 100 TABLET, FILM COATED ORAL at 08:24

## 2024-01-01 RX ADMIN — OXYCODONE HYDROCHLORIDE 5 MG: 5 TABLET ORAL at 01:25

## 2024-01-01 RX ADMIN — Medication 1 TABLET: at 08:40

## 2024-01-01 RX ADMIN — POLYETHYLENE GLYCOL 3350 17 G: 17 POWDER, FOR SOLUTION ORAL at 12:11

## 2024-01-01 RX ADMIN — GUAIFENESIN 600 MG: 600 TABLET ORAL at 01:06

## 2024-01-01 RX ADMIN — ROSUVASTATIN 40 MG: 40 TABLET, FILM COATED ORAL at 21:13

## 2024-01-01 RX ADMIN — Medication 10 MG: at 20:23

## 2024-01-01 RX ADMIN — TACROLIMUS 3 MG: 1 CAPSULE ORAL at 06:30

## 2024-01-01 RX ADMIN — DEXTROSE MONOHYDRATE 1000 MG: 50 INJECTION, SOLUTION INTRAVENOUS at 01:27

## 2024-01-01 RX ADMIN — LEVOTHYROXINE SODIUM 200 MCG: 0.2 TABLET ORAL at 06:26

## 2024-01-01 RX ADMIN — OLANZAPINE 2.5 MG: 10 INJECTION, POWDER, FOR SOLUTION INTRAMUSCULAR at 05:17

## 2024-01-01 RX ADMIN — ISOSORBIDE DINITRATE 40 MG: 10 TABLET ORAL at 16:16

## 2024-01-01 RX ADMIN — HYDRALAZINE HYDROCHLORIDE 50 MG: 50 TABLET ORAL at 00:25

## 2024-01-01 RX ADMIN — CALCIUM GLUCONATE 1 G: 20 INJECTION, SOLUTION INTRAVENOUS at 16:44

## 2024-01-01 RX ADMIN — ISOSORBIDE DINITRATE 40 MG: 10 TABLET ORAL at 01:04

## 2024-01-01 RX ADMIN — DIBASIC SODIUM PHOSPHATE, MONOBASIC POTASSIUM PHOSPHATE AND MONOBASIC SODIUM PHOSPHATE 500 MG: 852; 155; 130 TABLET ORAL at 21:00

## 2024-01-01 RX ADMIN — LEVOTHYROXINE SODIUM 200 MCG: 0.2 TABLET ORAL at 06:15

## 2024-01-01 RX ADMIN — HEPARIN SODIUM 5000 UNITS: 5000 INJECTION INTRAVENOUS; SUBCUTANEOUS at 16:28

## 2024-01-01 RX ADMIN — OXYCODONE HYDROCHLORIDE 5 MG: 5 TABLET ORAL at 21:21

## 2024-01-01 RX ADMIN — HYDROMORPHONE HYDROCHLORIDE 0.4 MG: 1 INJECTION, SOLUTION INTRAMUSCULAR; INTRAVENOUS; SUBCUTANEOUS at 03:17

## 2024-01-01 RX ADMIN — NYSTATIN 500000 UNITS: 100000 SUSPENSION ORAL at 18:10

## 2024-01-01 RX ADMIN — LEVOTHYROXINE SODIUM 250 MCG: 0.12 TABLET ORAL at 04:00

## 2024-01-01 RX ADMIN — PROPOFOL 36.98 MCG/KG/MIN: 10 INJECTION, EMULSION INTRAVENOUS at 09:54

## 2024-01-01 RX ADMIN — CALCITRIOL 0.5 MCG: 1 SOLUTION ORAL at 08:19

## 2024-01-01 RX ADMIN — VALGANCICLOVIR HYDROCHLORIDE 450 MG: 450 TABLET ORAL at 14:00

## 2024-01-01 RX ADMIN — NYSTATIN 500000 UNITS: 100000 SUSPENSION ORAL at 17:32

## 2024-01-01 RX ADMIN — ALBUMIN HUMAN 25 G: 0.25 SOLUTION INTRAVENOUS at 21:16

## 2024-01-01 RX ADMIN — OXYCODONE HYDROCHLORIDE 10 MG: 5 TABLET ORAL at 02:46

## 2024-01-01 RX ADMIN — TACROLIMUS 4 MG: 1 CAPSULE ORAL at 06:22

## 2024-01-01 RX ADMIN — CALCIUM CHLORIDE, MAGNESIUM CHLORIDE, DEXTROSE MONOHYDRATE, LACTIC ACID, SODIUM CHLORIDE, SODIUM BICARBONATE AND POTASSIUM CHLORIDE 2400 ML/HR: 3.68; 3.05; 22; 5.4; 6.46; 3.09; .314 INJECTION INTRAVENOUS at 18:38

## 2024-01-01 RX ADMIN — LEVOTHYROXINE SODIUM 200 MCG: 0.2 TABLET ORAL at 05:38

## 2024-01-01 RX ADMIN — MICAFUNGIN SODIUM 100 MG: 100 INJECTION, POWDER, LYOPHILIZED, FOR SOLUTION INTRAVENOUS at 11:15

## 2024-01-01 RX ADMIN — ONDANSETRON 4 MG: 2 INJECTION INTRAMUSCULAR; INTRAVENOUS at 09:15

## 2024-01-01 RX ADMIN — Medication: at 08:00

## 2024-01-01 RX ADMIN — SENNOSIDES AND DOCUSATE SODIUM 1 TABLET: 8.6; 5 TABLET ORAL at 09:18

## 2024-01-01 RX ADMIN — HYDROXYZINE HYDROCHLORIDE 25 MG: 25 TABLET, FILM COATED ORAL at 01:25

## 2024-01-01 RX ADMIN — TACROLIMUS 3.5 MG: 1 CAPSULE ORAL at 06:09

## 2024-01-01 RX ADMIN — HEPARIN SODIUM 5000 UNITS: 5000 INJECTION INTRAVENOUS; SUBCUTANEOUS at 00:24

## 2024-01-01 RX ADMIN — ACETAMINOPHEN 650 MG: 325 TABLET ORAL at 20:09

## 2024-01-01 RX ADMIN — INSULIN LISPRO 2 UNITS: 100 INJECTION, SOLUTION INTRAVENOUS; SUBCUTANEOUS at 12:25

## 2024-01-01 RX ADMIN — MILRINONE LACTATE 0.38 MCG/KG/MIN: 200 INJECTION, SOLUTION INTRAVENOUS at 15:45

## 2024-01-01 RX ADMIN — ONDANSETRON 4 MG: 2 INJECTION INTRAMUSCULAR; INTRAVENOUS at 09:33

## 2024-01-01 RX ADMIN — HYDROXYZINE HYDROCHLORIDE 25 MG: 25 TABLET, FILM COATED ORAL at 20:06

## 2024-01-01 RX ADMIN — HYDROMORPHONE HYDROCHLORIDE 0.2 MG: 1 INJECTION, SOLUTION INTRAMUSCULAR; INTRAVENOUS; SUBCUTANEOUS at 04:51

## 2024-01-01 RX ADMIN — HYDRALAZINE HYDROCHLORIDE 50 MG: 50 TABLET ORAL at 08:24

## 2024-01-01 RX ADMIN — DEXMEDETOMIDINE HYDROCHLORIDE 0.5 MCG/KG/HR: 400 INJECTION INTRAVENOUS at 23:35

## 2024-01-01 RX ADMIN — NYSTATIN 500000 UNITS: 100000 SUSPENSION ORAL at 23:34

## 2024-01-01 RX ADMIN — HEPARIN SODIUM 1000 UNITS: 1000 INJECTION INTRAVENOUS; SUBCUTANEOUS at 12:15

## 2024-01-01 RX ADMIN — HYDROMORPHONE HYDROCHLORIDE 0.2 MG: 1 INJECTION, SOLUTION INTRAMUSCULAR; INTRAVENOUS; SUBCUTANEOUS at 14:28

## 2024-01-01 RX ADMIN — HEPARIN SODIUM 10 ML/HR: 20000 INJECTION, SOLUTION INTRAVENOUS; SUBCUTANEOUS at 11:53

## 2024-01-01 RX ADMIN — ONDANSETRON 4 MG: 2 INJECTION INTRAMUSCULAR; INTRAVENOUS at 13:36

## 2024-01-01 RX ADMIN — OXYCODONE HYDROCHLORIDE 5 MG: 5 TABLET ORAL at 21:28

## 2024-01-01 RX ADMIN — ONDANSETRON 4 MG: 2 INJECTION INTRAMUSCULAR; INTRAVENOUS at 19:23

## 2024-01-01 RX ADMIN — SENNOSIDES AND DOCUSATE SODIUM 1 TABLET: 8.6; 5 TABLET ORAL at 09:34

## 2024-01-01 RX ADMIN — SODIUM NITROPRUSSIDE 1.4 MCG/KG/MIN: 0.5 INJECTION, SOLUTION INTRAVENOUS at 13:00

## 2024-01-01 RX ADMIN — SALINE NASAL SPRAY 1 SPRAY: 1.5 SOLUTION NASAL at 21:00

## 2024-01-01 RX ADMIN — ROSUVASTATIN 40 MG: 40 TABLET, FILM COATED ORAL at 21:04

## 2024-01-01 RX ADMIN — HYDROMORPHONE HYDROCHLORIDE 0.4 MG: 1 INJECTION, SOLUTION INTRAMUSCULAR; INTRAVENOUS; SUBCUTANEOUS at 05:01

## 2024-01-01 RX ADMIN — ONDANSETRON 4 MG: 2 INJECTION INTRAMUSCULAR; INTRAVENOUS at 16:19

## 2024-01-01 RX ADMIN — ONDANSETRON 4 MG: 2 INJECTION INTRAMUSCULAR; INTRAVENOUS at 14:00

## 2024-01-01 RX ADMIN — MILRINONE LACTATE 0.38 MCG/KG/MIN: 200 INJECTION, SOLUTION INTRAVENOUS at 13:49

## 2024-01-01 RX ADMIN — POTASSIUM PHOSPHATE, MONOBASIC POTASSIUM PHOSPHATE, DIBASIC 15 MMOL: 224; 236 INJECTION, SOLUTION, CONCENTRATE INTRAVENOUS at 09:32

## 2024-01-01 RX ADMIN — MYCOPHENOLIC ACID 360 MG: 360 TABLET, DELAYED RELEASE ORAL at 18:29

## 2024-01-01 RX ADMIN — SULFAMETHOXAZOLE AND TRIMETHOPRIM 80 MG OF TRIMETHOPRIM: 200; 40 SUSPENSION ORAL at 09:18

## 2024-01-01 RX ADMIN — ACETAMINOPHEN 650 MG: 650 SOLUTION ORAL at 14:05

## 2024-01-01 RX ADMIN — ROSUVASTATIN CALCIUM 40 MG: 40 TABLET, FILM COATED ORAL at 20:00

## 2024-01-01 RX ADMIN — DARBEPOETIN ALFA 100 MCG: 100 INJECTION, SOLUTION INTRAVENOUS; SUBCUTANEOUS at 08:35

## 2024-01-01 RX ADMIN — CALCIUM 1250 MG: 500 TABLET ORAL at 18:03

## 2024-01-01 RX ADMIN — ONDANSETRON 4 MG: 2 INJECTION INTRAMUSCULAR; INTRAVENOUS at 12:24

## 2024-01-01 RX ADMIN — HYDRALAZINE HYDROCHLORIDE 10 MG: 10 TABLET ORAL at 06:17

## 2024-01-01 RX ADMIN — VALGANCICLOVIR HYDROCHLORIDE 450 MG: 450 TABLET ORAL at 08:14

## 2024-01-01 RX ADMIN — DEXTROSE MONOHYDRATE 0.01 MCG/KG/MIN: 50 INJECTION, SOLUTION INTRAVENOUS at 22:47

## 2024-01-01 RX ADMIN — METHOCARBAMOL 500 MG: 500 TABLET ORAL at 16:44

## 2024-01-01 RX ADMIN — SALINE NASAL SPRAY 1 SPRAY: 1.5 SOLUTION NASAL at 22:08

## 2024-01-01 RX ADMIN — SULFAMETHOXAZOLE AND TRIMETHOPRIM 80 MG OF TRIMETHOPRIM: 200; 40 SUSPENSION ORAL at 10:56

## 2024-01-01 RX ADMIN — DIPHENHYDRAMINE HYDROCHLORIDE 25 MG: 50 INJECTION INTRAMUSCULAR; INTRAVENOUS at 22:52

## 2024-01-01 RX ADMIN — PREDNISONE 10 MG: 10 TABLET ORAL at 09:05

## 2024-01-01 RX ADMIN — HYDRALAZINE HYDROCHLORIDE 20 MG: 20 INJECTION INTRAMUSCULAR; INTRAVENOUS at 16:48

## 2024-01-01 RX ADMIN — PROPOFOL 30 MCG/KG/MIN: 10 INJECTION, EMULSION INTRAVENOUS at 00:51

## 2024-01-01 RX ADMIN — VALGANCICLOVIR HYDROCHLORIDE 450 MG: 450 TABLET ORAL at 09:05

## 2024-01-01 RX ADMIN — HYDRALAZINE HYDROCHLORIDE 100 MG: 100 TABLET, FILM COATED ORAL at 09:07

## 2024-01-01 RX ADMIN — LEVOTHYROXINE SODIUM 250 MCG: 0.2 TABLET ORAL at 06:42

## 2024-01-01 RX ADMIN — TRAZODONE HYDROCHLORIDE 50 MG: 50 TABLET ORAL at 20:22

## 2024-01-01 RX ADMIN — ONDANSETRON 4 MG: 2 INJECTION INTRAMUSCULAR; INTRAVENOUS at 07:39

## 2024-01-01 RX ADMIN — CALCIUM GLUCONATE 1 G: 20 INJECTION, SOLUTION INTRAVENOUS at 17:03

## 2024-01-01 RX ADMIN — Medication 1 TABLET: at 09:29

## 2024-01-01 RX ADMIN — NYSTATIN 500000 UNITS: 100000 SUSPENSION ORAL at 00:18

## 2024-01-01 RX ADMIN — DEXMEDETOMIDINE HYDROCHLORIDE 0.6 MCG/KG/HR: 400 INJECTION INTRAVENOUS at 05:42

## 2024-01-01 RX ADMIN — CALCITRIOL 0.5 MCG: 1 SOLUTION ORAL at 17:19

## 2024-01-01 RX ADMIN — ACETAMINOPHEN 650 MG: 325 TABLET ORAL at 21:18

## 2024-01-01 RX ADMIN — EPINEPHRINE 0.03 MCG/KG/MIN: 1 INJECTION INTRAMUSCULAR; INTRAVENOUS; SUBCUTANEOUS at 12:34

## 2024-01-01 RX ADMIN — NYSTATIN 500000 UNITS: 100000 SUSPENSION ORAL at 18:01

## 2024-01-01 RX ADMIN — DEXMEDETOMIDINE HYDROCHLORIDE 0.2 MCG/KG/HR: 400 INJECTION INTRAVENOUS at 23:13

## 2024-01-01 RX ADMIN — FOLIC ACID 1 MG: 1 TABLET ORAL at 08:41

## 2024-01-01 RX ADMIN — INSULIN LISPRO 2 UNITS: 100 INJECTION, SOLUTION INTRAVENOUS; SUBCUTANEOUS at 11:48

## 2024-01-01 RX ADMIN — CALCIUM GLUCONATE 1 G: 20 INJECTION, SOLUTION INTRAVENOUS at 18:25

## 2024-01-01 RX ADMIN — PANTOPRAZOLE SODIUM 40 MG: 40 INJECTION, POWDER, FOR SOLUTION INTRAVENOUS at 08:17

## 2024-01-01 RX ADMIN — ACETAMINOPHEN 650 MG: 650 SOLUTION ORAL at 09:09

## 2024-01-01 RX ADMIN — LEVOTHYROXINE SODIUM 200 MCG: 0.2 TABLET ORAL at 03:23

## 2024-01-01 RX ADMIN — PSYLLIUM HUSK 1 PACKET: 3.4 POWDER ORAL at 08:03

## 2024-01-01 RX ADMIN — INSULIN LISPRO 9 UNITS: 100 INJECTION, SOLUTION INTRAVENOUS; SUBCUTANEOUS at 18:19

## 2024-01-01 RX ADMIN — LEVOTHYROXINE SODIUM 200 MCG: 0.2 TABLET ORAL at 06:09

## 2024-01-01 RX ADMIN — HYDRALAZINE HYDROCHLORIDE 100 MG: 100 TABLET, FILM COATED ORAL at 01:04

## 2024-01-01 RX ADMIN — TACROLIMUS 2.5 MG: 1 CAPSULE ORAL at 06:20

## 2024-01-01 RX ADMIN — ETOMIDATE 20 MG: 2 INJECTION INTRAVENOUS at 16:12

## 2024-01-01 RX ADMIN — Medication 1 TABLET: at 08:20

## 2024-01-01 RX ADMIN — PANTOPRAZOLE SODIUM 40 MG: 40 TABLET, DELAYED RELEASE ORAL at 06:15

## 2024-01-01 RX ADMIN — LIDOCAINE 1 PATCH: 4 PATCH TOPICAL at 09:20

## 2024-01-01 RX ADMIN — TACROLIMUS 4 MG: 1 CAPSULE ORAL at 19:34

## 2024-01-01 RX ADMIN — VORICONAZOLE 340 MG: 10 INJECTION, POWDER, LYOPHILIZED, FOR SOLUTION INTRAVENOUS at 16:58

## 2024-01-01 RX ADMIN — FERROUS SULFATE TAB 325 MG (65 MG ELEMENTAL FE) 1 TABLET: 325 (65 FE) TAB at 08:04

## 2024-01-01 RX ADMIN — NYSTATIN 500000 UNITS: 100000 SUSPENSION ORAL at 18:28

## 2024-01-01 RX ADMIN — Medication 1 TABLET: at 08:03

## 2024-01-01 RX ADMIN — PROPOFOL 45 MCG/KG/MIN: 10 INJECTION, EMULSION INTRAVENOUS at 20:45

## 2024-01-01 RX ADMIN — LACTULOSE 30 G: 20 SOLUTION ORAL at 12:53

## 2024-01-01 RX ADMIN — FENTANYL CITRATE 50 MCG: 50 INJECTION INTRAMUSCULAR; INTRAVENOUS at 10:48

## 2024-01-01 RX ADMIN — ONDANSETRON 4 MG: 2 INJECTION INTRAMUSCULAR; INTRAVENOUS at 09:22

## 2024-01-01 RX ADMIN — PROPOFOL 50 MCG/KG/MIN: 10 INJECTION, EMULSION INTRAVENOUS at 06:58

## 2024-01-01 RX ADMIN — HEPARIN SODIUM 600 UNITS/HR: 10000 INJECTION, SOLUTION INTRAVENOUS at 19:57

## 2024-01-01 RX ADMIN — Medication 2.5 MG: at 18:30

## 2024-01-01 RX ADMIN — NYSTATIN 500000 UNITS: 100000 SUSPENSION ORAL at 06:26

## 2024-01-01 RX ADMIN — SIROLIMUS 1 MG: 1 SOLUTION ORAL at 10:22

## 2024-01-01 RX ADMIN — NYSTATIN 500000 UNITS: 100000 SUSPENSION ORAL at 12:30

## 2024-01-01 RX ADMIN — MILRINONE LACTATE 0.25 MCG/KG/MIN: 200 INJECTION, SOLUTION INTRAVENOUS at 05:09

## 2024-01-01 RX ADMIN — PROPOFOL 45 MCG/KG/MIN: 10 INJECTION, EMULSION INTRAVENOUS at 03:55

## 2024-01-01 RX ADMIN — HYDROXYZINE HYDROCHLORIDE 25 MG: 25 TABLET, FILM COATED ORAL at 07:50

## 2024-01-01 RX ADMIN — HYDRALAZINE HYDROCHLORIDE 50 MG: 50 TABLET ORAL at 08:25

## 2024-01-01 RX ADMIN — HYDROXYZINE HYDROCHLORIDE 25 MG: 25 TABLET, FILM COATED ORAL at 08:00

## 2024-01-01 RX ADMIN — SIROLIMUS 1 MG: 1 TABLET ORAL at 08:48

## 2024-01-01 RX ADMIN — DEXTROSE MONOHYDRATE 30 MMOL: 50 INJECTION, SOLUTION INTRAVENOUS at 06:21

## 2024-01-01 RX ADMIN — SMOFLIPID 50 G: 6; 6; 5; 3 INJECTION, EMULSION INTRAVENOUS at 22:57

## 2024-01-01 RX ADMIN — CALCITRIOL 0.5 MCG: 1 SOLUTION ORAL at 09:00

## 2024-01-01 RX ADMIN — NYSTATIN 500000 UNITS: 100000 SUSPENSION ORAL at 00:24

## 2024-01-01 RX ADMIN — Medication 1 TABLET: at 09:11

## 2024-01-01 RX ADMIN — HYDROMORPHONE HYDROCHLORIDE 0.2 MG: 1 INJECTION, SOLUTION INTRAMUSCULAR; INTRAVENOUS; SUBCUTANEOUS at 09:02

## 2024-01-01 RX ADMIN — MYCOPHENOLIC ACID 180 MG: 180 TABLET, DELAYED RELEASE ORAL at 18:41

## 2024-01-01 RX ADMIN — CALCIUM CHLORIDE, MAGNESIUM CHLORIDE, DEXTROSE MONOHYDRATE, LACTIC ACID, SODIUM CHLORIDE, SODIUM BICARBONATE AND POTASSIUM CHLORIDE 2400 ML/HR: 3.68; 3.05; 22; 5.4; 6.46; 3.09; .314 INJECTION INTRAVENOUS at 08:14

## 2024-01-01 RX ADMIN — DIBASIC SODIUM PHOSPHATE, MONOBASIC POTASSIUM PHOSPHATE AND MONOBASIC SODIUM PHOSPHATE 250 MG: 852; 155; 130 TABLET ORAL at 17:40

## 2024-01-01 RX ADMIN — SODIUM PHOSPHATE, MONOBASIC, MONOHYDRATE AND SODIUM PHOSPHATE, DIBASIC, ANHYDROUS 30 MMOL: 142; 276 INJECTION, SOLUTION INTRAVENOUS at 17:37

## 2024-01-01 RX ADMIN — PROPOFOL 10 MG: 10 INJECTION, EMULSION INTRAVENOUS at 11:47

## 2024-01-01 RX ADMIN — HEPARIN SODIUM 500 UNITS/HR: 10000 INJECTION, SOLUTION INTRAVENOUS at 23:34

## 2024-01-01 RX ADMIN — CALCITRIOL 0.5 MCG: 1 SOLUTION ORAL at 09:19

## 2024-01-01 RX ADMIN — INSULIN LISPRO 2 UNITS: 100 INJECTION, SOLUTION INTRAVENOUS; SUBCUTANEOUS at 15:15

## 2024-01-01 RX ADMIN — CALCITRIOL 0.5 MCG: 1 SOLUTION ORAL at 08:10

## 2024-01-01 RX ADMIN — Medication 1 TABLET: at 08:56

## 2024-01-01 RX ADMIN — PROPOFOL 50 MCG/KG/MIN: 10 INJECTION, EMULSION INTRAVENOUS at 06:29

## 2024-01-01 RX ADMIN — FERROUS SULFATE TAB 325 MG (65 MG ELEMENTAL FE) 1 TABLET: 325 (65 FE) TAB at 09:14

## 2024-01-01 RX ADMIN — NYSTATIN 500000 UNITS: 100000 SUSPENSION ORAL at 18:40

## 2024-01-01 RX ADMIN — NYSTATIN 500000 UNITS: 100000 SUSPENSION ORAL at 00:26

## 2024-01-01 RX ADMIN — SALINE NASAL SPRAY 5 SPRAY: 1.5 SOLUTION NASAL at 13:25

## 2024-01-01 RX ADMIN — POTASSIUM PHOSPHATE, MONOBASIC AND POTASSIUM PHOSPHATE, DIBASIC 21 MMOL: 224; 236 INJECTION, SOLUTION, CONCENTRATE INTRAVENOUS at 17:13

## 2024-01-01 RX ADMIN — HYDROMORPHONE HYDROCHLORIDE 0.4 MG: 1 INJECTION, SOLUTION INTRAMUSCULAR; INTRAVENOUS; SUBCUTANEOUS at 03:13

## 2024-01-01 RX ADMIN — TACROLIMUS 2 MG: 1 CAPSULE ORAL at 06:22

## 2024-01-01 RX ADMIN — VANCOMYCIN HYDROCHLORIDE 1000 MG: 1 INJECTION, SOLUTION INTRAVENOUS at 10:17

## 2024-01-01 RX ADMIN — PANTOPRAZOLE SODIUM 40 MG: 40 TABLET, DELAYED RELEASE ORAL at 06:36

## 2024-01-01 RX ADMIN — FERROUS SULFATE TAB 325 MG (65 MG ELEMENTAL FE) 1 TABLET: 325 (65 FE) TAB at 08:03

## 2024-01-01 RX ADMIN — PROPOFOL 35 MCG/KG/MIN: 10 INJECTION, EMULSION INTRAVENOUS at 12:08

## 2024-01-01 RX ADMIN — NYSTATIN 500000 UNITS: 100000 SUSPENSION ORAL at 06:03

## 2024-01-01 RX ADMIN — DIBASIC SODIUM PHOSPHATE, MONOBASIC POTASSIUM PHOSPHATE AND MONOBASIC SODIUM PHOSPHATE 500 MG: 852; 155; 130 TABLET ORAL at 20:22

## 2024-01-01 RX ADMIN — POTASSIUM CHLORIDE 40 MEQ: 29.8 INJECTION, SOLUTION INTRAVENOUS at 06:12

## 2024-01-01 RX ADMIN — OXYCODONE HYDROCHLORIDE 5 MG: 5 TABLET ORAL at 08:04

## 2024-01-01 RX ADMIN — ISOSORBIDE DINITRATE 20 MG: 10 TABLET ORAL at 16:57

## 2024-01-01 RX ADMIN — DIBASIC SODIUM PHOSPHATE, MONOBASIC POTASSIUM PHOSPHATE AND MONOBASIC SODIUM PHOSPHATE 500 MG: 852; 155; 130 TABLET ORAL at 18:00

## 2024-01-01 RX ADMIN — OXYCODONE HYDROCHLORIDE 5 MG: 5 TABLET ORAL at 12:00

## 2024-01-01 RX ADMIN — OXYCODONE HYDROCHLORIDE 5 MG: 5 TABLET ORAL at 15:12

## 2024-01-01 RX ADMIN — Medication 5 MG: at 01:42

## 2024-01-01 RX ADMIN — INSULIN HUMAN 3 UNITS: 100 INJECTION, SOLUTION PARENTERAL at 18:46

## 2024-01-01 RX ADMIN — POLYETHYLENE GLYCOL 3350 17 G: 17 POWDER, FOR SOLUTION ORAL at 21:12

## 2024-01-01 RX ADMIN — CALCIUM GLUCONATE 1 G: 20 INJECTION, SOLUTION INTRAVENOUS at 01:25

## 2024-01-01 RX ADMIN — CALCITRIOL 0.5 MCG: 1 SOLUTION ORAL at 09:34

## 2024-01-01 RX ADMIN — TRAZODONE HYDROCHLORIDE 25 MG: 50 TABLET ORAL at 00:32

## 2024-01-01 RX ADMIN — CLEVIPIDINE 6 MG/HR: 0.5 EMULSION INTRAVENOUS at 01:44

## 2024-01-01 RX ADMIN — ALBUMIN HUMAN 12.5 G: 0.05 INJECTION, SOLUTION INTRAVENOUS at 13:51

## 2024-01-01 RX ADMIN — FOLIC ACID 1 MG: 1 TABLET ORAL at 08:08

## 2024-01-01 RX ADMIN — OXYCODONE HYDROCHLORIDE 5 MG: 5 TABLET ORAL at 21:07

## 2024-01-01 RX ADMIN — SENNOSIDES AND DOCUSATE SODIUM 2 TABLET: 8.6; 5 TABLET ORAL at 08:08

## 2024-01-01 RX ADMIN — HYDRALAZINE HYDROCHLORIDE 10 MG: 20 INJECTION INTRAMUSCULAR; INTRAVENOUS at 06:44

## 2024-01-01 RX ADMIN — Medication 15 ML: at 10:22

## 2024-01-01 RX ADMIN — HYDROXYZINE HYDROCHLORIDE 25 MG: 25 TABLET, FILM COATED ORAL at 20:23

## 2024-01-01 RX ADMIN — FOLIC ACID 1 MG: 1 TABLET ORAL at 08:03

## 2024-01-01 RX ADMIN — INSULIN LISPRO 5 UNITS: 100 INJECTION, SOLUTION INTRAVENOUS; SUBCUTANEOUS at 01:27

## 2024-01-01 RX ADMIN — NYSTATIN 500000 UNITS: 100000 SUSPENSION ORAL at 00:56

## 2024-01-01 RX ADMIN — METHOCARBAMOL 500 MG: 500 TABLET ORAL at 23:34

## 2024-01-01 RX ADMIN — ALBUMIN HUMAN 25 G: 0.25 SOLUTION INTRAVENOUS at 17:50

## 2024-01-01 RX ADMIN — OXYCODONE HYDROCHLORIDE 5 MG: 5 TABLET ORAL at 17:37

## 2024-01-01 RX ADMIN — HYDROMORPHONE HYDROCHLORIDE 0.4 MG: 1 INJECTION, SOLUTION INTRAMUSCULAR; INTRAVENOUS; SUBCUTANEOUS at 02:21

## 2024-01-01 RX ADMIN — ACETAMINOPHEN 650 MG: 650 SOLUTION ORAL at 22:32

## 2024-01-01 RX ADMIN — NYSTATIN 500000 UNITS: 100000 SUSPENSION ORAL at 01:40

## 2024-01-01 RX ADMIN — Medication 1 TABLET: at 08:31

## 2024-01-01 RX ADMIN — DIBASIC SODIUM PHOSPHATE, MONOBASIC POTASSIUM PHOSPHATE AND MONOBASIC SODIUM PHOSPHATE 500 MG: 852; 155; 130 TABLET ORAL at 09:02

## 2024-01-01 RX ADMIN — PANTOPRAZOLE SODIUM 40 MG: 40 INJECTION, POWDER, FOR SOLUTION INTRAVENOUS at 08:15

## 2024-01-01 RX ADMIN — VALGANCICLOVIR HYDROCHLORIDE 450 MG: 450 TABLET ORAL at 14:45

## 2024-01-01 RX ADMIN — ISOSORBIDE DINITRATE 20 MG: 20 TABLET ORAL at 00:18

## 2024-01-01 RX ADMIN — POTASSIUM & SODIUM PHOSPHATES POWDER PACK 280-160-250 MG 1 PACKET: 280-160-250 PACK at 08:21

## 2024-01-01 RX ADMIN — VANCOMYCIN HYDROCHLORIDE 2 G: 1 INJECTION, SOLUTION INTRAVENOUS at 01:30

## 2024-01-01 RX ADMIN — TACROLIMUS 1 MG: 1 CAPSULE ORAL at 15:39

## 2024-01-01 RX ADMIN — POLYETHYLENE GLYCOL 3350 17 G: 17 POWDER, FOR SOLUTION ORAL at 20:29

## 2024-01-01 RX ADMIN — INSULIN LISPRO 6 UNITS: 100 INJECTION, SOLUTION INTRAVENOUS; SUBCUTANEOUS at 16:00

## 2024-01-01 RX ADMIN — PIPERACILLIN SODIUM AND TAZOBACTAM SODIUM 4.5 G: 4; .5 INJECTION, SOLUTION INTRAVENOUS at 03:43

## 2024-01-01 RX ADMIN — SENNOSIDES AND DOCUSATE SODIUM 2 TABLET: 8.6; 5 TABLET ORAL at 20:48

## 2024-01-01 RX ADMIN — DEXMEDETOMIDINE HYDROCHLORIDE 0.2 MCG/KG/HR: 400 INJECTION INTRAVENOUS at 21:18

## 2024-01-01 RX ADMIN — INSULIN LISPRO 5 UNITS: 100 INJECTION, SOLUTION INTRAVENOUS; SUBCUTANEOUS at 05:44

## 2024-01-01 RX ADMIN — NYSTATIN 500000 UNITS: 100000 SUSPENSION ORAL at 19:00

## 2024-01-01 RX ADMIN — CALCIUM GLUCONATE 5612 MG: 20 INJECTION, SOLUTION INTRAVENOUS at 09:51

## 2024-01-01 RX ADMIN — ACETAMINOPHEN 650 MG: 325 TABLET ORAL at 09:03

## 2024-01-01 RX ADMIN — OXYCODONE HYDROCHLORIDE 5 MG: 5 TABLET ORAL at 20:30

## 2024-01-01 RX ADMIN — HYDRALAZINE HYDROCHLORIDE 20 MG: 20 INJECTION INTRAMUSCULAR; INTRAVENOUS at 12:45

## 2024-01-01 RX ADMIN — ONDANSETRON 4 MG: 2 INJECTION INTRAMUSCULAR; INTRAVENOUS at 12:33

## 2024-01-01 RX ADMIN — AMLODIPINE BESYLATE 2.5 MG: 5 TABLET ORAL at 10:00

## 2024-01-01 RX ADMIN — PREDNISONE 10 MG: 10 TABLET ORAL at 08:20

## 2024-01-01 RX ADMIN — TACROLIMUS 1 MG: 1 CAPSULE ORAL at 06:07

## 2024-01-01 RX ADMIN — VALGANCICLOVIR HYDROCHLORIDE 450 MG: 50 POWDER, FOR SOLUTION ORAL at 09:51

## 2024-01-01 RX ADMIN — DEXMEDETOMIDINE HYDROCHLORIDE 0.2 MCG/KG/HR: 400 INJECTION INTRAVENOUS at 20:38

## 2024-01-01 RX ADMIN — INSULIN LISPRO 15 UNITS: 100 INJECTION, SOLUTION INTRAVENOUS; SUBCUTANEOUS at 20:28

## 2024-01-01 RX ADMIN — HEPARIN SODIUM 1000 UNITS: 1000 INJECTION INTRAVENOUS; SUBCUTANEOUS at 13:15

## 2024-01-01 RX ADMIN — PROPOFOL 39.99 MCG/KG/MIN: 10 INJECTION, EMULSION INTRAVENOUS at 14:10

## 2024-01-01 RX ADMIN — QUETIAPINE 50 MG: 25 TABLET ORAL at 21:24

## 2024-01-01 RX ADMIN — PIPERACILLIN SODIUM AND TAZOBACTAM SODIUM 3.38 G: 3; .375 INJECTION, SOLUTION INTRAVENOUS at 03:56

## 2024-01-01 RX ADMIN — SODIUM CHLORIDE, POTASSIUM CHLORIDE, SODIUM LACTATE AND CALCIUM CHLORIDE 5 ML/HR: 600; 310; 30; 20 INJECTION, SOLUTION INTRAVENOUS at 18:39

## 2024-01-01 RX ADMIN — ROCURONIUM 50 MG: 50 INJECTION, SOLUTION INTRAVENOUS at 16:25

## 2024-01-01 RX ADMIN — ONDANSETRON 4 MG: 2 INJECTION INTRAMUSCULAR; INTRAVENOUS at 19:49

## 2024-01-01 RX ADMIN — ONDANSETRON 4 MG: 2 INJECTION INTRAMUSCULAR; INTRAVENOUS at 22:52

## 2024-01-01 RX ADMIN — NYSTATIN 500000 UNITS: 100000 SUSPENSION ORAL at 12:17

## 2024-01-01 RX ADMIN — Medication 3 MG: at 06:30

## 2024-01-01 RX ADMIN — HYDRALAZINE HYDROCHLORIDE 100 MG: 100 TABLET, FILM COATED ORAL at 16:18

## 2024-01-01 RX ADMIN — MILRINONE LACTATE 0.38 MCG/KG/MIN: 200 INJECTION, SOLUTION INTRAVENOUS at 08:00

## 2024-01-01 RX ADMIN — DEXMEDETOMIDINE HYDROCHLORIDE 0.8 MCG/KG/HR: 400 INJECTION INTRAVENOUS at 16:40

## 2024-01-01 RX ADMIN — MIDAZOLAM HYDROCHLORIDE 0.5 MG: 1 INJECTION INTRAMUSCULAR; INTRAVENOUS at 15:59

## 2024-01-01 RX ADMIN — HYDROMORPHONE HYDROCHLORIDE 0.2 MG: 1 INJECTION, SOLUTION INTRAMUSCULAR; INTRAVENOUS; SUBCUTANEOUS at 20:32

## 2024-01-01 RX ADMIN — TACROLIMUS 1 MG: 1 CAPSULE ORAL at 06:29

## 2024-01-01 RX ADMIN — POTASSIUM & SODIUM PHOSPHATES POWDER PACK 280-160-250 MG 1 PACKET: 280-160-250 PACK at 13:07

## 2024-01-01 RX ADMIN — HYDROXYZINE HYDROCHLORIDE 25 MG: 25 TABLET, FILM COATED ORAL at 15:12

## 2024-01-01 RX ADMIN — ALBUMIN HUMAN 25 G: 250 SOLUTION INTRAVENOUS at 14:45

## 2024-01-01 RX ADMIN — ONDANSETRON 4 MG: 2 INJECTION INTRAMUSCULAR; INTRAVENOUS at 02:30

## 2024-01-01 RX ADMIN — INSULIN LISPRO 5 UNITS: 100 INJECTION, SOLUTION INTRAVENOUS; SUBCUTANEOUS at 00:55

## 2024-01-01 RX ADMIN — VANCOMYCIN HYDROCHLORIDE 1000 MG: 1 INJECTION, SOLUTION INTRAVENOUS at 07:20

## 2024-01-01 RX ADMIN — ONDANSETRON 4 MG: 2 INJECTION INTRAMUSCULAR; INTRAVENOUS at 11:58

## 2024-01-01 RX ADMIN — Medication 2 G: at 20:56

## 2024-01-01 RX ADMIN — DIBASIC SODIUM PHOSPHATE, MONOBASIC POTASSIUM PHOSPHATE AND MONOBASIC SODIUM PHOSPHATE 500 MG: 852; 155; 130 TABLET ORAL at 12:26

## 2024-01-01 RX ADMIN — SODIUM PHOSPHATE, MONOBASIC, MONOHYDRATE AND SODIUM PHOSPHATE, DIBASIC, ANHYDROUS 21 MMOL: 142; 276 INJECTION, SOLUTION INTRAVENOUS at 10:32

## 2024-01-01 RX ADMIN — HYDROMORPHONE HYDROCHLORIDE 0.2 MG: 1 INJECTION, SOLUTION INTRAMUSCULAR; INTRAVENOUS; SUBCUTANEOUS at 08:56

## 2024-01-01 RX ADMIN — ONDANSETRON 4 MG: 2 INJECTION INTRAMUSCULAR; INTRAVENOUS at 08:11

## 2024-01-01 RX ADMIN — INSULIN LISPRO 2 UNITS: 100 INJECTION, SOLUTION INTRAVENOUS; SUBCUTANEOUS at 12:38

## 2024-01-01 RX ADMIN — HYDRALAZINE HYDROCHLORIDE 100 MG: 100 TABLET, FILM COATED ORAL at 00:56

## 2024-01-01 RX ADMIN — DIBASIC SODIUM PHOSPHATE, MONOBASIC POTASSIUM PHOSPHATE AND MONOBASIC SODIUM PHOSPHATE 500 MG: 852; 155; 130 TABLET ORAL at 09:11

## 2024-01-01 RX ADMIN — FERROUS SULFATE TAB 325 MG (65 MG ELEMENTAL FE) 1 TABLET: 325 (65 FE) TAB at 09:45

## 2024-01-01 RX ADMIN — HYDROXYZINE HYDROCHLORIDE 25 MG: 25 TABLET, FILM COATED ORAL at 09:07

## 2024-01-01 RX ADMIN — HEPARIN SODIUM 5000 UNITS: 5000 INJECTION INTRAVENOUS; SUBCUTANEOUS at 02:22

## 2024-01-01 RX ADMIN — PANTOPRAZOLE SODIUM 40 MG: 40 TABLET, DELAYED RELEASE ORAL at 06:07

## 2024-01-01 RX ADMIN — DEXMEDETOMIDINE HYDROCHLORIDE 0.2 MCG/KG/HR: 400 INJECTION INTRAVENOUS at 09:31

## 2024-01-01 RX ADMIN — LEVOTHYROXINE SODIUM 200 MCG: 0.2 TABLET ORAL at 03:30

## 2024-01-01 RX ADMIN — ACETAMINOPHEN 975 MG: 325 TABLET ORAL at 08:17

## 2024-01-01 RX ADMIN — ONDANSETRON 4 MG: 2 INJECTION INTRAMUSCULAR; INTRAVENOUS at 08:56

## 2024-01-01 RX ADMIN — HYDRALAZINE HYDROCHLORIDE 50 MG: 50 TABLET ORAL at 15:41

## 2024-01-01 RX ADMIN — INSULIN HUMAN 3 UNITS: 100 INJECTION, SOLUTION PARENTERAL at 16:00

## 2024-01-01 RX ADMIN — MIDAZOLAM 2 MG: 1 INJECTION INTRAMUSCULAR; INTRAVENOUS at 11:33

## 2024-01-01 RX ADMIN — INSULIN HUMAN 4 UNITS: 100 INJECTION, SOLUTION PARENTERAL at 01:17

## 2024-01-01 RX ADMIN — INSULIN LISPRO 10 UNITS: 100 INJECTION, SOLUTION INTRAVENOUS; SUBCUTANEOUS at 02:50

## 2024-01-01 RX ADMIN — ONDANSETRON 4 MG: 2 INJECTION INTRAMUSCULAR; INTRAVENOUS at 00:09

## 2024-01-01 RX ADMIN — MILRINONE LACTATE 0.25 MCG/KG/MIN: 200 INJECTION, SOLUTION INTRAVENOUS at 21:46

## 2024-01-01 RX ADMIN — ONDANSETRON 4 MG: 2 INJECTION INTRAMUSCULAR; INTRAVENOUS at 20:35

## 2024-01-01 RX ADMIN — OXYCODONE HYDROCHLORIDE 10 MG: 5 SOLUTION ORAL at 03:38

## 2024-01-01 RX ADMIN — PANTOPRAZOLE SODIUM 40 MG: 40 TABLET, DELAYED RELEASE ORAL at 16:40

## 2024-01-01 RX ADMIN — HEPARIN SODIUM 5000 UNITS: 5000 INJECTION INTRAVENOUS; SUBCUTANEOUS at 18:26

## 2024-01-01 RX ADMIN — OXYCODONE HYDROCHLORIDE 5 MG: 5 TABLET ORAL at 07:45

## 2024-01-01 RX ADMIN — ISOSORBIDE DINITRATE 40 MG: 10 TABLET ORAL at 16:37

## 2024-01-01 RX ADMIN — ROSUVASTATIN CALCIUM 10 MG: 10 TABLET, FILM COATED ORAL at 20:05

## 2024-01-01 RX ADMIN — FUROSEMIDE 20 MG/HR: 10 INJECTION, SOLUTION INTRAMUSCULAR; INTRAVENOUS at 11:30

## 2024-01-01 RX ADMIN — LEVOTHYROXINE SODIUM 200 MCG: 0.2 TABLET ORAL at 06:13

## 2024-01-01 RX ADMIN — TACROLIMUS 1.5 MG: 1 CAPSULE ORAL at 19:21

## 2024-01-01 RX ADMIN — OXYCODONE HYDROCHLORIDE 5 MG: 5 TABLET ORAL at 14:15

## 2024-01-01 RX ADMIN — ACETAMINOPHEN 650 MG: 325 TABLET ORAL at 14:13

## 2024-01-01 RX ADMIN — Medication 1 TABLET: at 08:18

## 2024-01-01 RX ADMIN — ASPIRIN 81 MG: 81 TABLET, COATED ORAL at 09:59

## 2024-01-01 RX ADMIN — TRAZODONE HYDROCHLORIDE 25 MG: 50 TABLET ORAL at 02:36

## 2024-01-01 RX ADMIN — PERFLUTREN 10 ML OF DILUTION: 6.52 INJECTION, SUSPENSION INTRAVENOUS at 17:30

## 2024-01-01 RX ADMIN — HYDRALAZINE HYDROCHLORIDE 100 MG: 100 TABLET, FILM COATED ORAL at 18:03

## 2024-01-01 RX ADMIN — PIPERACILLIN SODIUM AND TAZOBACTAM SODIUM 4.5 G: 4; .5 INJECTION, SOLUTION INTRAVENOUS at 10:10

## 2024-01-01 RX ADMIN — VALGANCICLOVIR 450 MG: 50 FOR SOLUTION ORAL at 11:56

## 2024-01-01 RX ADMIN — DIBASIC SODIUM PHOSPHATE, MONOBASIC POTASSIUM PHOSPHATE AND MONOBASIC SODIUM PHOSPHATE 500 MG: 852; 155; 130 TABLET ORAL at 20:07

## 2024-01-01 RX ADMIN — VALGANCICLOVIR HYDROCHLORIDE 450 MG: 450 TABLET ORAL at 08:02

## 2024-01-01 RX ADMIN — TRAZODONE HYDROCHLORIDE 25 MG: 50 TABLET ORAL at 20:00

## 2024-01-01 RX ADMIN — Medication 10 MG: at 17:56

## 2024-01-01 RX ADMIN — HYDROXYZINE HYDROCHLORIDE 25 MG: 25 TABLET, FILM COATED ORAL at 09:29

## 2024-01-01 RX ADMIN — QUETIAPINE 50 MG: 25 TABLET ORAL at 20:22

## 2024-01-01 RX ADMIN — SODIUM PHOSPHATE, MONOBASIC, MONOHYDRATE AND SODIUM PHOSPHATE, DIBASIC, ANHYDROUS 21 MMOL: 142; 276 INJECTION, SOLUTION INTRAVENOUS at 06:14

## 2024-01-01 RX ADMIN — ONDANSETRON 4 MG: 2 INJECTION INTRAMUSCULAR; INTRAVENOUS at 20:15

## 2024-01-01 RX ADMIN — INSULIN HUMAN 8 UNITS: 100 INJECTION, SOLUTION PARENTERAL at 11:45

## 2024-01-01 RX ADMIN — ONDANSETRON 4 MG: 2 INJECTION INTRAMUSCULAR; INTRAVENOUS at 06:32

## 2024-01-01 RX ADMIN — Medication 10 MG: at 20:46

## 2024-01-01 RX ADMIN — PIPERACILLIN SODIUM AND TAZOBACTAM SODIUM 4.5 G: 4; .5 INJECTION, SOLUTION INTRAVENOUS at 15:35

## 2024-01-01 RX ADMIN — TACROLIMUS 3 MG: 1 CAPSULE ORAL at 17:30

## 2024-01-01 RX ADMIN — CALCIUM GLUCONATE 1 G: 20 INJECTION, SOLUTION INTRAVENOUS at 09:14

## 2024-01-01 RX ADMIN — CLEVIPIDINE 4 MG/HR: 0.5 EMULSION INTRAVENOUS at 23:38

## 2024-01-01 RX ADMIN — HEPARIN SODIUM 5000 UNITS: 5000 INJECTION INTRAVENOUS; SUBCUTANEOUS at 08:14

## 2024-01-01 RX ADMIN — Medication 2 G: at 20:39

## 2024-01-01 RX ADMIN — TACROLIMUS 1 MG: 1 CAPSULE ORAL at 18:09

## 2024-01-01 RX ADMIN — CLEVIPIDINE 4 MG/HR: 0.5 EMULSION INTRAVENOUS at 07:40

## 2024-01-01 RX ADMIN — IPRATROPIUM BROMIDE AND ALBUTEROL SULFATE 3 ML: .5; 3 SOLUTION RESPIRATORY (INHALATION) at 14:18

## 2024-01-01 RX ADMIN — TRAZODONE HYDROCHLORIDE 12.5 MG: 50 TABLET ORAL at 21:00

## 2024-01-01 RX ADMIN — ALBUMIN HUMAN 25 G: 0.25 SOLUTION INTRAVENOUS at 10:30

## 2024-01-01 RX ADMIN — CALCIUM GLUCONATE 1 G: 20 INJECTION, SOLUTION INTRAVENOUS at 16:50

## 2024-01-01 RX ADMIN — VORICONAZOLE 340 MG: 10 INJECTION, POWDER, LYOPHILIZED, FOR SOLUTION INTRAVENOUS at 05:33

## 2024-01-01 RX ADMIN — HYDROMORPHONE HYDROCHLORIDE 0.4 MG: 1 INJECTION, SOLUTION INTRAMUSCULAR; INTRAVENOUS; SUBCUTANEOUS at 16:18

## 2024-01-01 RX ADMIN — Medication 5 MG: at 20:01

## 2024-01-01 RX ADMIN — INSULIN LISPRO 4 UNITS: 100 INJECTION, SOLUTION INTRAVENOUS; SUBCUTANEOUS at 15:31

## 2024-01-01 RX ADMIN — TACROLIMUS 4 MG: 1 CAPSULE ORAL at 08:20

## 2024-01-01 RX ADMIN — HEPARIN SODIUM 5000 UNITS: 5000 INJECTION INTRAVENOUS; SUBCUTANEOUS at 08:34

## 2024-01-01 RX ADMIN — CALCIUM CHLORIDE, MAGNESIUM CHLORIDE, DEXTROSE MONOHYDRATE, LACTIC ACID, SODIUM CHLORIDE, SODIUM BICARBONATE AND POTASSIUM CHLORIDE 2400 ML/HR: 3.68; 3.05; 22; 5.4; 6.46; 3.09; .314 INJECTION INTRAVENOUS at 10:10

## 2024-01-01 RX ADMIN — LACTULOSE 30 G: 20 SOLUTION ORAL at 15:27

## 2024-01-01 RX ADMIN — HEPARIN SODIUM 10 UNITS/HR: 10000 INJECTION, SOLUTION INTRAVENOUS at 05:20

## 2024-01-01 RX ADMIN — IRON SUCROSE 200 MG: 20 INJECTION, SOLUTION INTRAVENOUS at 06:32

## 2024-01-01 RX ADMIN — PERFLUTREN 10 ML OF DILUTION: 6.52 INJECTION, SUSPENSION INTRAVENOUS at 17:12

## 2024-01-01 RX ADMIN — CLEVIPIDINE 4 MG/HR: 0.5 EMULSION INTRAVENOUS at 10:46

## 2024-01-01 RX ADMIN — PROMETHAZINE HYDROCHLORIDE 12.5 MG: 25 INJECTION INTRAMUSCULAR; INTRAVENOUS at 02:15

## 2024-01-01 RX ADMIN — INSULIN LISPRO 10 UNITS: 100 INJECTION, SOLUTION INTRAVENOUS; SUBCUTANEOUS at 16:30

## 2024-01-01 RX ADMIN — MILRINONE LACTATE 0.25 MCG/KG/MIN: 200 INJECTION, SOLUTION INTRAVENOUS at 21:18

## 2024-01-01 RX ADMIN — INSULIN LISPRO 2 UNITS: 100 INJECTION, SOLUTION INTRAVENOUS; SUBCUTANEOUS at 21:04

## 2024-01-01 RX ADMIN — HEPARIN SODIUM 5000 UNITS: 5000 INJECTION INTRAVENOUS; SUBCUTANEOUS at 03:10

## 2024-01-01 RX ADMIN — PREDNISONE 10 MG: 10 TABLET ORAL at 08:50

## 2024-01-01 RX ADMIN — DEXMEDETOMIDINE HYDROCHLORIDE 0.6 MCG/KG/HR: 400 INJECTION INTRAVENOUS at 00:54

## 2024-01-01 RX ADMIN — FUROSEMIDE 40 MG: 10 INJECTION, SOLUTION INTRAMUSCULAR; INTRAVENOUS at 03:59

## 2024-01-01 RX ADMIN — NYSTATIN 500000 UNITS: 100000 SUSPENSION ORAL at 05:42

## 2024-01-01 RX ADMIN — SENNOSIDES AND DOCUSATE SODIUM 2 TABLET: 8.6; 5 TABLET ORAL at 08:41

## 2024-01-01 RX ADMIN — ISOSORBIDE DINITRATE 40 MG: 10 TABLET ORAL at 16:18

## 2024-01-01 RX ADMIN — MAGNESIUM SULFATE HEPTAHYDRATE 2 G: 40 INJECTION, SOLUTION INTRAVENOUS at 18:28

## 2024-01-01 RX ADMIN — HEPARIN SODIUM 5000 UNITS: 5000 INJECTION INTRAVENOUS; SUBCUTANEOUS at 00:47

## 2024-01-01 RX ADMIN — HYDRALAZINE HYDROCHLORIDE 75 MG: 50 TABLET ORAL at 08:39

## 2024-01-01 RX ADMIN — HYDRALAZINE HYDROCHLORIDE 5 MG: 20 INJECTION INTRAMUSCULAR; INTRAVENOUS at 02:01

## 2024-01-01 RX ADMIN — QUETIAPINE 50 MG: 25 TABLET ORAL at 20:06

## 2024-01-01 RX ADMIN — DEXTROSE MONOHYDRATE 0.01 MCG/KG/MIN: 50 INJECTION, SOLUTION INTRAVENOUS at 14:52

## 2024-01-01 RX ADMIN — FENTANYL CITRATE 50 MCG: 50 INJECTION, SOLUTION INTRAMUSCULAR; INTRAVENOUS at 11:33

## 2024-01-01 RX ADMIN — HYDROMORPHONE HYDROCHLORIDE 0.2 MG: 1 INJECTION, SOLUTION INTRAMUSCULAR; INTRAVENOUS; SUBCUTANEOUS at 01:04

## 2024-01-01 RX ADMIN — ACETAMINOPHEN 650 MG: 325 TABLET ORAL at 20:29

## 2024-01-01 RX ADMIN — Medication 2 G: at 20:46

## 2024-01-01 RX ADMIN — CALCIUM GLUCONATE 6167 MG: 20 INJECTION, SOLUTION INTRAVENOUS at 13:52

## 2024-01-01 RX ADMIN — ONDANSETRON 4 MG: 2 INJECTION INTRAMUSCULAR; INTRAVENOUS at 19:33

## 2024-01-01 RX ADMIN — PROPOFOL 35 MCG/KG/MIN: 10 INJECTION, EMULSION INTRAVENOUS at 13:21

## 2024-01-01 RX ADMIN — OXYCODONE HYDROCHLORIDE 5 MG: 5 TABLET ORAL at 14:29

## 2024-01-01 RX ADMIN — SALINE NASAL SPRAY 1 SPRAY: 1.5 SOLUTION NASAL at 09:03

## 2024-01-01 RX ADMIN — TACROLIMUS 3.5 MG: 1 CAPSULE ORAL at 18:14

## 2024-01-01 RX ADMIN — NYSTATIN 500000 UNITS: 100000 SUSPENSION ORAL at 06:50

## 2024-01-01 RX ADMIN — ANTI-THYMOCYTE GLOBULIN (RABBIT) 150 MG: 5 INJECTION, POWDER, LYOPHILIZED, FOR SOLUTION INTRAVENOUS at 21:14

## 2024-01-01 RX ADMIN — HYDRALAZINE HYDROCHLORIDE 5 MG: 20 INJECTION INTRAMUSCULAR; INTRAVENOUS at 05:15

## 2024-01-01 RX ADMIN — MILRINONE LACTATE 0.25 MCG/KG/MIN: 200 INJECTION, SOLUTION INTRAVENOUS at 08:43

## 2024-01-01 RX ADMIN — INSULIN HUMAN 12 UNITS: 100 INJECTION, SUSPENSION SUBCUTANEOUS at 10:59

## 2024-01-01 RX ADMIN — NYSTATIN 500000 UNITS: 100000 SUSPENSION ORAL at 06:08

## 2024-01-01 RX ADMIN — ASPIRIN 81 MG CHEWABLE TABLET 81 MG: 81 TABLET CHEWABLE at 08:20

## 2024-01-01 RX ADMIN — SODIUM CHLORIDE 500 ML: 9 INJECTION, SOLUTION INTRAVENOUS at 09:41

## 2024-01-01 RX ADMIN — SIROLIMUS 1 MG: 1 SOLUTION ORAL at 09:52

## 2024-01-01 RX ADMIN — CEPHALEXIN 500 MG: 500 CAPSULE ORAL at 06:42

## 2024-01-01 RX ADMIN — ONDANSETRON 4 MG: 2 INJECTION INTRAMUSCULAR; INTRAVENOUS at 14:15

## 2024-01-01 RX ADMIN — TACROLIMUS 3 MG: 1 CAPSULE ORAL at 06:38

## 2024-01-01 RX ADMIN — MILRINONE LACTATE 0.25 MCG/KG/MIN: 200 INJECTION, SOLUTION INTRAVENOUS at 04:13

## 2024-01-01 RX ADMIN — HEPARIN SODIUM 1400 UNITS: 1000 INJECTION INTRAVENOUS; SUBCUTANEOUS at 16:41

## 2024-01-01 RX ADMIN — SENNOSIDES AND DOCUSATE SODIUM 1 TABLET: 8.6; 5 TABLET ORAL at 20:00

## 2024-01-01 RX ADMIN — HYDROMORPHONE HYDROCHLORIDE 0.2 MG: 1 INJECTION, SOLUTION INTRAMUSCULAR; INTRAVENOUS; SUBCUTANEOUS at 14:00

## 2024-01-01 RX ADMIN — PREDNISONE 10 MG: 10 TABLET ORAL at 08:08

## 2024-01-01 RX ADMIN — TACROLIMUS 1.5 MG: 1 CAPSULE ORAL at 06:36

## 2024-01-01 RX ADMIN — HYDROMORPHONE HYDROCHLORIDE 0.4 MG: 1 INJECTION, SOLUTION INTRAMUSCULAR; INTRAVENOUS; SUBCUTANEOUS at 16:24

## 2024-01-01 RX ADMIN — MILRINONE LACTATE 0.38 MCG/KG/MIN: 200 INJECTION, SOLUTION INTRAVENOUS at 03:32

## 2024-01-01 RX ADMIN — HEPARIN SODIUM 1000 UNITS: 1000 INJECTION INTRAVENOUS; SUBCUTANEOUS at 14:30

## 2024-01-01 RX ADMIN — Medication 10 MG: at 20:06

## 2024-01-01 RX ADMIN — LIDOCAINE 1 PATCH: 4 PATCH TOPICAL at 08:40

## 2024-01-01 RX ADMIN — PREDNISONE 10 MG: 10 TABLET ORAL at 08:15

## 2024-01-01 RX ADMIN — FERROUS SULFATE TAB 325 MG (65 MG ELEMENTAL FE) 1 TABLET: 325 (65 FE) TAB at 09:11

## 2024-01-01 RX ADMIN — VANCOMYCIN HYDROCHLORIDE 750 MG: 750 INJECTION, SOLUTION INTRAVENOUS at 09:57

## 2024-01-01 RX ADMIN — Medication 10 MG: at 18:14

## 2024-01-01 RX ADMIN — Medication 800 MG: at 10:00

## 2024-01-01 RX ADMIN — PROPOFOL 10 MG: 10 INJECTION, EMULSION INTRAVENOUS at 12:00

## 2024-01-01 RX ADMIN — HYDROMORPHONE HYDROCHLORIDE 0.2 MG: 1 INJECTION, SOLUTION INTRAMUSCULAR; INTRAVENOUS; SUBCUTANEOUS at 07:23

## 2024-01-01 RX ADMIN — PIPERACILLIN SODIUM AND TAZOBACTAM SODIUM 3.38 G: 3; .375 INJECTION, SOLUTION INTRAVENOUS at 00:02

## 2024-01-01 RX ADMIN — SIROLIMUS 2 MG: 1 SOLUTION ORAL at 08:21

## 2024-01-01 RX ADMIN — MYCOPHENOLIC ACID 180 MG: 180 TABLET, DELAYED RELEASE ORAL at 06:05

## 2024-01-01 RX ADMIN — ACETAMINOPHEN 975 MG: 325 TABLET ORAL at 14:45

## 2024-01-01 RX ADMIN — LEVOTHYROXINE SODIUM 250 MCG: 0.12 TABLET ORAL at 03:10

## 2024-01-01 RX ADMIN — DIPHENHYDRAMINE HYDROCHLORIDE 25 MG: 25 CAPSULE ORAL at 09:42

## 2024-01-01 RX ADMIN — SULFAMETHOXAZOLE AND TRIMETHOPRIM 80 MG OF TRIMETHOPRIM: 200; 40 SUSPENSION ORAL at 11:30

## 2024-01-01 RX ADMIN — OXYCODONE HYDROCHLORIDE 5 MG: 5 TABLET ORAL at 00:17

## 2024-01-01 RX ADMIN — LEVOTHYROXINE SODIUM 200 MCG: 200 TABLET ORAL at 06:36

## 2024-01-01 RX ADMIN — PREDNISONE 10 MG: 10 TABLET ORAL at 09:46

## 2024-01-01 RX ADMIN — INSULIN HUMAN 3 UNITS: 100 INJECTION, SOLUTION PARENTERAL at 18:21

## 2024-01-01 RX ADMIN — SALINE NASAL SPRAY 5 SPRAY: 1.5 SOLUTION NASAL at 20:51

## 2024-01-01 RX ADMIN — CALCIUM GLUCONATE 2 G: 20 INJECTION, SOLUTION INTRAVENOUS at 08:32

## 2024-01-01 RX ADMIN — SULFAMETHOXAZOLE AND TRIMETHOPRIM 80 MG OF TRIMETHOPRIM: 200; 40 SUSPENSION ORAL at 09:21

## 2024-01-01 RX ADMIN — HEPARIN SODIUM 1000 UNITS: 1000 INJECTION INTRAVENOUS; SUBCUTANEOUS at 14:29

## 2024-01-01 RX ADMIN — MILRINONE LACTATE IN DEXTROSE 0.12 MCG/KG/MIN: 200 INJECTION, SOLUTION INTRAVENOUS at 06:37

## 2024-01-01 RX ADMIN — PIPERACILLIN SODIUM AND TAZOBACTAM SODIUM 4.5 G: 4; .5 INJECTION, SOLUTION INTRAVENOUS at 21:24

## 2024-01-01 RX ADMIN — HYDROXYZINE HYDROCHLORIDE 25 MG: 25 TABLET, FILM COATED ORAL at 15:10

## 2024-01-01 RX ADMIN — ACETAMINOPHEN 650 MG: 325 TABLET ORAL at 15:22

## 2024-01-01 RX ADMIN — Medication 800 MG: at 09:48

## 2024-01-01 RX ADMIN — CYANOCOBALAMIN TAB 1000 MCG 500 MCG: 1000 TAB at 08:19

## 2024-01-01 RX ADMIN — PANTOPRAZOLE SODIUM 40 MG: 40 INJECTION, POWDER, FOR SOLUTION INTRAVENOUS at 08:00

## 2024-01-01 RX ADMIN — ACETAMINOPHEN 975 MG: 325 TABLET ORAL at 21:00

## 2024-01-01 RX ADMIN — SENNOSIDES AND DOCUSATE SODIUM 1 TABLET: 8.6; 5 TABLET ORAL at 08:45

## 2024-01-01 RX ADMIN — MICAFUNGIN SODIUM 100 MG: 100 INJECTION, POWDER, LYOPHILIZED, FOR SOLUTION INTRAVENOUS at 10:08

## 2024-01-01 RX ADMIN — ONDANSETRON 4 MG: 2 INJECTION INTRAMUSCULAR; INTRAVENOUS at 08:32

## 2024-01-01 RX ADMIN — PSYLLIUM HUSK 1 PACKET: 3.4 POWDER ORAL at 08:04

## 2024-01-01 RX ADMIN — ONDANSETRON 4 MG: 2 INJECTION INTRAMUSCULAR; INTRAVENOUS at 07:50

## 2024-01-01 RX ADMIN — HEPARIN SODIUM 1000 UNITS: 1000 INJECTION INTRAVENOUS; SUBCUTANEOUS at 13:00

## 2024-01-01 RX ADMIN — HYDRALAZINE HYDROCHLORIDE 100 MG: 100 TABLET, FILM COATED ORAL at 01:09

## 2024-01-01 RX ADMIN — PANTOPRAZOLE SODIUM 40 MG: 40 TABLET, DELAYED RELEASE ORAL at 06:20

## 2024-01-01 RX ADMIN — INSULIN LISPRO 2 UNITS: 100 INJECTION, SOLUTION INTRAVENOUS; SUBCUTANEOUS at 16:00

## 2024-01-01 RX ADMIN — ONDANSETRON 4 MG: 2 INJECTION INTRAMUSCULAR; INTRAVENOUS at 16:14

## 2024-01-01 RX ADMIN — SALINE NASAL SPRAY 5 SPRAY: 1.5 SOLUTION NASAL at 14:03

## 2024-01-01 RX ADMIN — ESOMEPRAZOLE MAGNESIUM 40 MG: 40 FOR SUSPENSION ORAL at 09:49

## 2024-01-01 RX ADMIN — FERROUS SULFATE TAB 325 MG (65 MG ELEMENTAL FE) 1 TABLET: 325 (65 FE) TAB at 09:18

## 2024-01-01 RX ADMIN — SIROLIMUS 1 MG: 1 TABLET ORAL at 08:06

## 2024-01-01 RX ADMIN — MILRINONE LACTATE 0.25 MCG/KG/MIN: 200 INJECTION, SOLUTION INTRAVENOUS at 13:24

## 2024-01-01 RX ADMIN — Medication 120 MG: at 16:17

## 2024-01-01 RX ADMIN — MILRINONE LACTATE 0.25 MCG/KG/MIN: 200 INJECTION, SOLUTION INTRAVENOUS at 20:27

## 2024-01-01 RX ADMIN — HEPARIN SODIUM 10 ML/HR: 1000 INJECTION INTRAVENOUS; SUBCUTANEOUS at 14:13

## 2024-01-01 RX ADMIN — MEROPENEM 1 G: 1 INJECTION, POWDER, FOR SOLUTION INTRAVENOUS at 09:12

## 2024-01-01 RX ADMIN — Medication 30 PERCENT: at 08:23

## 2024-01-01 RX ADMIN — OXYCODONE HYDROCHLORIDE 5 MG: 5 TABLET ORAL at 03:14

## 2024-01-01 RX ADMIN — HEPARIN SODIUM 5000 UNITS: 5000 INJECTION INTRAVENOUS; SUBCUTANEOUS at 01:30

## 2024-01-01 RX ADMIN — ESOMEPRAZOLE MAGNESIUM 40 MG: 40 FOR SUSPENSION ORAL at 06:14

## 2024-01-01 RX ADMIN — PIPERACILLIN SODIUM AND TAZOBACTAM SODIUM 4.5 G: 4; .5 INJECTION, SOLUTION INTRAVENOUS at 03:31

## 2024-01-01 RX ADMIN — CALCIUM CHLORIDE, MAGNESIUM CHLORIDE, DEXTROSE MONOHYDRATE, LACTIC ACID, SODIUM CHLORIDE, SODIUM BICARBONATE AND POTASSIUM CHLORIDE 2400 ML/HR: 3.68; 3.05; 22; 5.4; 6.46; 3.09; .314 INJECTION INTRAVENOUS at 15:19

## 2024-01-01 RX ADMIN — PANTOPRAZOLE SODIUM 40 MG: 40 INJECTION, POWDER, FOR SOLUTION INTRAVENOUS at 20:32

## 2024-01-01 RX ADMIN — NYSTATIN 500000 UNITS: 100000 SUSPENSION ORAL at 12:14

## 2024-01-01 RX ADMIN — METHOCARBAMOL 500 MG: 500 TABLET ORAL at 06:15

## 2024-01-01 RX ADMIN — INSULIN LISPRO 2 UNITS: 100 INJECTION, SOLUTION INTRAVENOUS; SUBCUTANEOUS at 12:35

## 2024-01-01 RX ADMIN — HYDRALAZINE HYDROCHLORIDE 25 MG: 25 TABLET ORAL at 09:09

## 2024-01-01 RX ADMIN — PANTOPRAZOLE SODIUM 40 MG: 40 TABLET, DELAYED RELEASE ORAL at 21:09

## 2024-01-01 RX ADMIN — VALGANCICLOVIR HYDROCHLORIDE 450 MG: 450 TABLET ORAL at 13:40

## 2024-01-01 RX ADMIN — TRAZODONE HYDROCHLORIDE 25 MG: 50 TABLET ORAL at 21:13

## 2024-01-01 RX ADMIN — FUROSEMIDE 80 MG: 10 INJECTION, SOLUTION INTRAMUSCULAR; INTRAVENOUS at 22:28

## 2024-01-01 RX ADMIN — MULTIVIT AND MINERALS-FERROUS GLUCONATE 9 MG IRON/15 ML ORAL LIQUID 15 ML: at 12:16

## 2024-01-01 RX ADMIN — HYDRALAZINE HYDROCHLORIDE 100 MG: 100 TABLET, FILM COATED ORAL at 16:16

## 2024-01-01 RX ADMIN — ONDANSETRON 4 MG: 2 INJECTION INTRAMUSCULAR; INTRAVENOUS at 16:30

## 2024-01-01 RX ADMIN — ACETAMINOPHEN 650 MG: 325 TABLET ORAL at 03:56

## 2024-01-01 RX ADMIN — SENNOSIDES AND DOCUSATE SODIUM 2 TABLET: 8.6; 5 TABLET ORAL at 09:16

## 2024-01-01 RX ADMIN — ACETAMINOPHEN 650 MG: 325 TABLET ORAL at 21:54

## 2024-01-01 RX ADMIN — POLYETHYLENE GLYCOL 3350 17 G: 17 POWDER, FOR SOLUTION ORAL at 20:33

## 2024-01-01 RX ADMIN — CALCITRIOL 0.5 MCG: 1 SOLUTION ORAL at 09:50

## 2024-01-01 RX ADMIN — NYSTATIN 500000 UNITS: 100000 SUSPENSION ORAL at 12:18

## 2024-01-01 RX ADMIN — ALBUMIN HUMAN 25 G: 0.25 SOLUTION INTRAVENOUS at 13:13

## 2024-01-01 RX ADMIN — VANCOMYCIN HYDROCHLORIDE 750 MG: 750 INJECTION, SOLUTION INTRAVENOUS at 12:16

## 2024-01-01 RX ADMIN — ISOSORBIDE DINITRATE 20 MG: 20 TABLET ORAL at 16:35

## 2024-01-01 RX ADMIN — ALBUMIN HUMAN 12.5 G: 0.05 INJECTION, SOLUTION INTRAVENOUS at 05:23

## 2024-01-01 RX ADMIN — HEPARIN SODIUM AND DEXTROSE 1600 UNITS/HR: 10000; 5 INJECTION INTRAVENOUS at 21:53

## 2024-01-01 RX ADMIN — LEVOTHYROXINE SODIUM 200 MCG: 0.2 TABLET ORAL at 05:19

## 2024-01-01 RX ADMIN — CYANOCOBALAMIN TAB 1000 MCG 500 MCG: 1000 TAB at 09:13

## 2024-01-01 RX ADMIN — CEFAZOLIN SODIUM 2 G: 2 INJECTION, SOLUTION INTRAVENOUS at 05:41

## 2024-01-01 RX ADMIN — ONDANSETRON 4 MG: 2 INJECTION, SOLUTION INTRAMUSCULAR; INTRAVENOUS at 12:15

## 2024-01-01 RX ADMIN — TACROLIMUS 1.5 MG: 1 CAPSULE ORAL at 18:03

## 2024-01-01 RX ADMIN — INSULIN LISPRO 10 UNITS: 100 INJECTION, SOLUTION INTRAVENOUS; SUBCUTANEOUS at 17:36

## 2024-01-01 RX ADMIN — POTASSIUM & SODIUM PHOSPHATES POWDER PACK 280-160-250 MG 1 PACKET: 280-160-250 PACK at 16:58

## 2024-01-01 RX ADMIN — ISOSORBIDE DINITRATE 40 MG: 10 TABLET ORAL at 17:25

## 2024-01-01 RX ADMIN — SALINE NASAL SPRAY 1 SPRAY: 1.5 SOLUTION NASAL at 17:00

## 2024-01-01 RX ADMIN — HEPARIN SODIUM 5000 UNITS: 5000 INJECTION INTRAVENOUS; SUBCUTANEOUS at 16:00

## 2024-01-01 RX ADMIN — ASPIRIN 81 MG CHEWABLE TABLET 81 MG: 81 TABLET CHEWABLE at 08:43

## 2024-01-01 RX ADMIN — DIBASIC SODIUM PHOSPHATE, MONOBASIC POTASSIUM PHOSPHATE AND MONOBASIC SODIUM PHOSPHATE 500 MG: 852; 155; 130 TABLET ORAL at 20:03

## 2024-01-01 RX ADMIN — QUETIAPINE 50 MG: 25 TABLET ORAL at 21:19

## 2024-01-01 RX ADMIN — HEPARIN SODIUM 100 UNITS/HR: 10000 INJECTION, SOLUTION INTRAVENOUS at 09:21

## 2024-01-01 RX ADMIN — OXYCODONE HYDROCHLORIDE 5 MG: 5 TABLET ORAL at 16:19

## 2024-01-01 RX ADMIN — ACETAMINOPHEN 650 MG: 325 TABLET ORAL at 02:02

## 2024-01-01 RX ADMIN — ACETAMINOPHEN 650 MG: 325 TABLET ORAL at 05:52

## 2024-01-01 RX ADMIN — HYDROXYZINE HYDROCHLORIDE 25 MG: 25 TABLET, FILM COATED ORAL at 14:13

## 2024-01-01 RX ADMIN — SALINE NASAL SPRAY 1 SPRAY: 1.5 SOLUTION NASAL at 20:42

## 2024-01-01 RX ADMIN — ACETAMINOPHEN 650 MG: 325 TABLET ORAL at 10:58

## 2024-01-01 RX ADMIN — PANTOPRAZOLE SODIUM 40 MG: 40 INJECTION, POWDER, FOR SOLUTION INTRAVENOUS at 08:02

## 2024-01-01 RX ADMIN — NYSTATIN 500000 UNITS: 100000 SUSPENSION ORAL at 12:10

## 2024-01-01 RX ADMIN — HYDROXYZINE HYDROCHLORIDE 25 MG: 25 TABLET, FILM COATED ORAL at 04:06

## 2024-01-01 RX ADMIN — HYDROXYZINE HYDROCHLORIDE 25 MG: 25 TABLET, FILM COATED ORAL at 20:08

## 2024-01-01 RX ADMIN — ACETAMINOPHEN 975 MG: 325 TABLET ORAL at 20:09

## 2024-01-01 RX ADMIN — LEVOTHYROXINE SODIUM 50 MCG: 50 TABLET ORAL at 17:39

## 2024-01-01 RX ADMIN — HYDRALAZINE HYDROCHLORIDE 50 MG: 50 TABLET ORAL at 00:18

## 2024-01-01 RX ADMIN — HYDRALAZINE HYDROCHLORIDE 5 MG: 20 INJECTION INTRAMUSCULAR; INTRAVENOUS at 07:30

## 2024-01-01 RX ADMIN — CYANOCOBALAMIN TAB 1000 MCG 500 MCG: 1000 TAB at 08:16

## 2024-01-01 RX ADMIN — POLYETHYLENE GLYCOL 3350 17 G: 17 POWDER, FOR SOLUTION ORAL at 20:46

## 2024-01-01 RX ADMIN — NYSTATIN 500000 UNITS: 100000 SUSPENSION ORAL at 00:30

## 2024-01-01 RX ADMIN — PANTOPRAZOLE SODIUM 40 MG: 40 TABLET, DELAYED RELEASE ORAL at 20:51

## 2024-01-01 RX ADMIN — ACETAMINOPHEN 975 MG: 325 TABLET ORAL at 13:24

## 2024-01-01 RX ADMIN — FENTANYL CITRATE 25 MCG: 50 INJECTION, SOLUTION INTRAMUSCULAR; INTRAVENOUS at 12:25

## 2024-01-01 RX ADMIN — HYDROMORPHONE HYDROCHLORIDE 0.2 MG: 1 INJECTION, SOLUTION INTRAMUSCULAR; INTRAVENOUS; SUBCUTANEOUS at 03:55

## 2024-01-01 RX ADMIN — PREDNISONE 10 MG: 10 TABLET ORAL at 09:29

## 2024-01-01 RX ADMIN — DIBASIC SODIUM PHOSPHATE, MONOBASIC POTASSIUM PHOSPHATE AND MONOBASIC SODIUM PHOSPHATE 500 MG: 852; 155; 130 TABLET ORAL at 12:51

## 2024-01-01 RX ADMIN — DARBEPOETIN ALFA 100 MCG: 100 INJECTION, SOLUTION INTRAVENOUS; SUBCUTANEOUS at 08:39

## 2024-01-01 RX ADMIN — CYANOCOBALAMIN TAB 1000 MCG 500 MCG: 1000 TAB at 09:12

## 2024-01-01 RX ADMIN — FERROUS SULFATE TAB 325 MG (65 MG ELEMENTAL FE) 1 TABLET: 325 (65 FE) TAB at 07:37

## 2024-01-01 RX ADMIN — ONDANSETRON 4 MG: 2 INJECTION INTRAMUSCULAR; INTRAVENOUS at 01:06

## 2024-01-01 RX ADMIN — NYSTATIN 500000 UNITS: 100000 SUSPENSION ORAL at 02:09

## 2024-01-01 RX ADMIN — CALCIUM GLUCONATE 1 G: 20 INJECTION, SOLUTION INTRAVENOUS at 14:45

## 2024-01-01 RX ADMIN — CALCIUM CHLORIDE, MAGNESIUM CHLORIDE, DEXTROSE MONOHYDRATE, LACTIC ACID, SODIUM CHLORIDE, SODIUM BICARBONATE AND POTASSIUM CHLORIDE 25 ML/KG/HR: 3.68; 3.05; 22; 5.4; 6.46; 3.09; .314 INJECTION INTRAVENOUS at 13:51

## 2024-01-01 RX ADMIN — ONDANSETRON 4 MG: 2 INJECTION INTRAMUSCULAR; INTRAVENOUS at 00:17

## 2024-01-01 RX ADMIN — PROPOFOL 35 MCG/KG/MIN: 10 INJECTION, EMULSION INTRAVENOUS at 18:10

## 2024-01-01 RX ADMIN — HEPARIN SODIUM 5000 UNITS: 5000 INJECTION INTRAVENOUS; SUBCUTANEOUS at 00:00

## 2024-01-01 RX ADMIN — Medication 15 ML: at 11:57

## 2024-01-01 RX ADMIN — INSULIN LISPRO 10 UNITS: 100 INJECTION, SOLUTION INTRAVENOUS; SUBCUTANEOUS at 16:59

## 2024-01-01 RX ADMIN — CEFAZOLIN 2 G: 330 INJECTION, POWDER, FOR SOLUTION INTRAMUSCULAR; INTRAVENOUS at 19:13

## 2024-01-01 RX ADMIN — Medication 1 TABLET: at 09:13

## 2024-01-01 RX ADMIN — INSULIN LISPRO 5 UNITS: 100 INJECTION, SOLUTION INTRAVENOUS; SUBCUTANEOUS at 12:00

## 2024-01-01 RX ADMIN — FUROSEMIDE 10 MG/HR: 10 INJECTION, SOLUTION INTRAMUSCULAR; INTRAVENOUS at 04:38

## 2024-01-01 RX ADMIN — Medication 8 MG/HR: at 05:29

## 2024-01-01 RX ADMIN — TACROLIMUS 4 MG: 1 CAPSULE ORAL at 18:09

## 2024-01-01 RX ADMIN — DIBASIC SODIUM PHOSPHATE, MONOBASIC POTASSIUM PHOSPHATE AND MONOBASIC SODIUM PHOSPHATE 500 MG: 852; 155; 130 TABLET ORAL at 06:38

## 2024-01-01 RX ADMIN — HYDRALAZINE HYDROCHLORIDE 50 MG: 50 TABLET ORAL at 00:30

## 2024-01-01 RX ADMIN — IMMUNE GLOBULIN INFUSION (HUMAN) 10 G: 100 INJECTION, SOLUTION INTRAVENOUS; SUBCUTANEOUS at 19:46

## 2024-01-01 RX ADMIN — NYSTATIN 500000 UNITS: 100000 SUSPENSION ORAL at 01:04

## 2024-01-01 RX ADMIN — FERROUS SULFATE TAB 325 MG (65 MG ELEMENTAL FE) 1 TABLET: 325 (65 FE) TAB at 08:16

## 2024-01-01 RX ADMIN — POLYETHYLENE GLYCOL 3350 17 G: 17 POWDER, FOR SOLUTION ORAL at 20:48

## 2024-01-01 RX ADMIN — Medication 21 PERCENT: at 08:00

## 2024-01-01 RX ADMIN — OXYCODONE HYDROCHLORIDE 10 MG: 5 TABLET ORAL at 04:09

## 2024-01-01 RX ADMIN — HYDRALAZINE HYDROCHLORIDE 20 MG: 20 INJECTION INTRAMUSCULAR; INTRAVENOUS at 22:03

## 2024-01-01 RX ADMIN — HYDRALAZINE HYDROCHLORIDE 100 MG: 100 TABLET, FILM COATED ORAL at 15:10

## 2024-01-01 RX ADMIN — ASPIRIN 81 MG: 81 TABLET, COATED ORAL at 08:34

## 2024-01-01 RX ADMIN — CALCIUM GLUCONATE 1 G: 20 INJECTION, SOLUTION INTRAVENOUS at 06:29

## 2024-01-01 RX ADMIN — PHYTONADIONE 10 MG: 10 INJECTION, EMULSION INTRAMUSCULAR; INTRAVENOUS; SUBCUTANEOUS at 12:51

## 2024-01-01 RX ADMIN — ACETAMINOPHEN 650 MG: 325 TABLET ORAL at 14:00

## 2024-01-01 RX ADMIN — NYSTATIN 500000 UNITS: 100000 SUSPENSION ORAL at 16:44

## 2024-01-01 RX ADMIN — ALBUMIN HUMAN 12.5 G: 0.05 INJECTION, SOLUTION INTRAVENOUS at 13:50

## 2024-01-01 RX ADMIN — PANTOPRAZOLE SODIUM 40 MG: 40 INJECTION, POWDER, FOR SOLUTION INTRAVENOUS at 09:32

## 2024-01-01 RX ADMIN — NYSTATIN 500000 UNITS: 100000 SUSPENSION ORAL at 11:48

## 2024-01-01 RX ADMIN — HEPARIN SODIUM 5000 UNITS: 5000 INJECTION INTRAVENOUS; SUBCUTANEOUS at 16:43

## 2024-01-01 RX ADMIN — FUROSEMIDE 80 MG: 10 INJECTION, SOLUTION INTRAMUSCULAR; INTRAVENOUS at 02:18

## 2024-01-01 RX ADMIN — CALCIUM CHLORIDE, MAGNESIUM CHLORIDE, DEXTROSE MONOHYDRATE, LACTIC ACID, SODIUM CHLORIDE, SODIUM BICARBONATE AND POTASSIUM CHLORIDE 2400 ML/HR: 3.68; 3.05; 22; 5.4; 6.46; 3.09; .314 INJECTION INTRAVENOUS at 10:54

## 2024-01-01 RX ADMIN — DEXTROSE MONOHYDRATE 0.02 MCG/KG/MIN: 50 INJECTION, SOLUTION INTRAVENOUS at 16:35

## 2024-01-01 RX ADMIN — PANTOPRAZOLE SODIUM 40 MG: 40 TABLET, DELAYED RELEASE ORAL at 06:38

## 2024-01-01 RX ADMIN — HYDROXYZINE HYDROCHLORIDE 25 MG: 25 TABLET, FILM COATED ORAL at 19:43

## 2024-01-01 RX ADMIN — HYDRALAZINE HYDROCHLORIDE 5 MG: 20 INJECTION INTRAMUSCULAR; INTRAVENOUS at 20:00

## 2024-01-01 RX ADMIN — SODIUM NITROPRUSSIDE 0.2 MCG/KG/MIN: 0.5 INJECTION, SOLUTION INTRAVENOUS at 02:39

## 2024-01-01 RX ADMIN — NYSTATIN 500000 UNITS: 100000 SUSPENSION ORAL at 06:12

## 2024-01-01 RX ADMIN — TRAZODONE HYDROCHLORIDE 25 MG: 50 TABLET ORAL at 22:32

## 2024-01-01 RX ADMIN — CALCITRIOL 0.5 MCG: 1 SOLUTION ORAL at 12:18

## 2024-01-01 RX ADMIN — PREDNISONE 10 MG: 10 TABLET ORAL at 08:43

## 2024-01-01 RX ADMIN — HEPARIN SODIUM 5000 UNITS: 5000 INJECTION INTRAVENOUS; SUBCUTANEOUS at 08:43

## 2024-01-01 RX ADMIN — OXYCODONE HYDROCHLORIDE 5 MG: 5 TABLET ORAL at 12:02

## 2024-01-01 RX ADMIN — VANCOMYCIN HYDROCHLORIDE 2000 MG: 5 INJECTION, POWDER, LYOPHILIZED, FOR SOLUTION INTRAVENOUS at 15:44

## 2024-01-01 RX ADMIN — INSULIN LISPRO 10 UNITS: 100 INJECTION, SOLUTION INTRAVENOUS; SUBCUTANEOUS at 21:25

## 2024-01-01 RX ADMIN — ACETAMINOPHEN 650 MG: 325 TABLET ORAL at 20:32

## 2024-01-01 RX ADMIN — Medication 1 TABLET: at 08:44

## 2024-01-01 RX ADMIN — ISOSORBIDE DINITRATE 20 MG: 20 TABLET ORAL at 08:25

## 2024-01-01 RX ADMIN — PREDNISONE 10 MG: 10 TABLET ORAL at 09:19

## 2024-01-01 RX ADMIN — CALCIUM GLUCONATE 2 G: 20 INJECTION, SOLUTION INTRAVENOUS at 20:29

## 2024-01-01 RX ADMIN — CALCITRIOL 0.5 MCG: 1 SOLUTION ORAL at 08:57

## 2024-01-01 RX ADMIN — ONDANSETRON 4 MG: 2 INJECTION INTRAMUSCULAR; INTRAVENOUS at 11:55

## 2024-01-01 RX ADMIN — OXYCODONE HYDROCHLORIDE 2.5 MG: 5 TABLET ORAL at 04:12

## 2024-01-01 RX ADMIN — PROPOFOL 50 MCG/KG/MIN: 10 INJECTION, EMULSION INTRAVENOUS at 04:00

## 2024-01-01 RX ADMIN — NYSTATIN 500000 UNITS: 100000 SUSPENSION ORAL at 00:12

## 2024-01-01 RX ADMIN — ROSUVASTATIN 40 MG: 40 TABLET, FILM COATED ORAL at 21:01

## 2024-01-01 RX ADMIN — POLYETHYLENE GLYCOL 3350 17 G: 17 POWDER, FOR SOLUTION ORAL at 09:14

## 2024-01-01 RX ADMIN — SALINE NASAL SPRAY 1 SPRAY: 1.5 SOLUTION NASAL at 20:46

## 2024-01-01 RX ADMIN — HYDRALAZINE HYDROCHLORIDE 10 MG: 10 TABLET ORAL at 15:38

## 2024-01-01 RX ADMIN — MULTIVIT AND MINERALS-FERROUS GLUCONATE 9 MG IRON/15 ML ORAL LIQUID 15 ML: at 09:18

## 2024-01-01 RX ADMIN — NYSTATIN 400000 UNITS: 100000 SUSPENSION ORAL at 13:00

## 2024-01-01 RX ADMIN — PROPOFOL 30 MCG/KG/MIN: 10 INJECTION, EMULSION INTRAVENOUS at 18:25

## 2024-01-01 RX ADMIN — PREDNISONE 10 MG: 10 TABLET ORAL at 09:37

## 2024-01-01 RX ADMIN — CALCIUM GLUCONATE 2 G: 20 INJECTION, SOLUTION INTRAVENOUS at 13:30

## 2024-01-01 RX ADMIN — HEPARIN SODIUM 1000 UNITS: 1000 INJECTION INTRAVENOUS; SUBCUTANEOUS at 12:20

## 2024-01-01 RX ADMIN — HYDRALAZINE HYDROCHLORIDE 100 MG: 100 TABLET, FILM COATED ORAL at 00:47

## 2024-01-01 RX ADMIN — FERROUS SULFATE TAB 325 MG (65 MG ELEMENTAL FE) 1 TABLET: 325 (65 FE) TAB at 08:18

## 2024-01-01 RX ADMIN — ACETAMINOPHEN 650 MG: 325 TABLET ORAL at 00:55

## 2024-01-01 RX ADMIN — DEXMEDETOMIDINE HYDROCHLORIDE 1 MCG/KG/HR: 400 INJECTION INTRAVENOUS at 14:05

## 2024-01-01 RX ADMIN — MILRINONE LACTATE 0.25 MCG/KG/MIN: 200 INJECTION, SOLUTION INTRAVENOUS at 15:00

## 2024-01-01 RX ADMIN — VALGANCICLOVIR HYDROCHLORIDE 450 MG: 450 TABLET ORAL at 09:19

## 2024-01-01 RX ADMIN — CALCITRIOL 0.5 MCG: 1 SOLUTION ORAL at 09:07

## 2024-01-01 RX ADMIN — INSULIN LISPRO 15 UNITS: 100 INJECTION, SOLUTION INTRAVENOUS; SUBCUTANEOUS at 10:59

## 2024-01-01 RX ADMIN — Medication 1 TABLET: at 09:05

## 2024-01-01 RX ADMIN — ETOMIDATE 20 MG: 2 INJECTION INTRAVENOUS at 18:49

## 2024-01-01 RX ADMIN — ASCORBIC ACID, VITAMIN A PALMITATE, CHOLECALCIFEROL, THIAMINE HYDROCHLORIDE, RIBOFLAVIN-5 PHOSPHATE SODIUM, PYRIDOXINE HYDROCHLORIDE, NIACINAMIDE, DEXPANTHENOL, ALPHA-TOCOPHEROL ACETATE, VITAMIN K1, FOLIC ACID, BIOTIN, CYANOCOBALAMIN: 200; 3300; 200; 6; 3.6; 6; 40; 15; 10; 150; 600; 60; 5 INJECTION, SOLUTION INTRAVENOUS at 22:51

## 2024-01-01 RX ADMIN — HEPARIN SODIUM 5000 UNITS: 5000 INJECTION INTRAVENOUS; SUBCUTANEOUS at 15:10

## 2024-01-01 RX ADMIN — ROSUVASTATIN CALCIUM 10 MG: 10 TABLET, FILM COATED ORAL at 20:07

## 2024-01-01 RX ADMIN — HYDROMORPHONE HYDROCHLORIDE 0.2 MG: 1 INJECTION, SOLUTION INTRAMUSCULAR; INTRAVENOUS; SUBCUTANEOUS at 20:07

## 2024-01-01 RX ADMIN — METHYLPREDNISOLONE SODIUM SUCCINATE 1000 MG: 1 INJECTION, POWDER, LYOPHILIZED, FOR SOLUTION INTRAMUSCULAR; INTRAVENOUS at 01:18

## 2024-01-01 RX ADMIN — OXYCODONE HYDROCHLORIDE 5 MG: 5 TABLET ORAL at 04:07

## 2024-01-01 RX ADMIN — CALCIUM CHLORIDE, MAGNESIUM CHLORIDE, DEXTROSE MONOHYDRATE, LACTIC ACID, SODIUM CHLORIDE, SODIUM BICARBONATE AND POTASSIUM CHLORIDE 2400 ML/HR: 3.68; 3.05; 22; 5.4; 6.46; 3.09; .314 INJECTION INTRAVENOUS at 15:20

## 2024-01-01 RX ADMIN — SULFAMETHOXAZOLE AND TRIMETHOPRIM 80 MG OF TRIMETHOPRIM: 200; 40 SUSPENSION ORAL at 09:50

## 2024-01-01 RX ADMIN — DEXMEDETOMIDINE HYDROCHLORIDE 1 MCG/KG/HR: 400 INJECTION INTRAVENOUS at 08:59

## 2024-01-01 RX ADMIN — MILRINONE LACTATE 0.25 MCG/KG/MIN: 200 INJECTION, SOLUTION INTRAVENOUS at 15:50

## 2024-01-01 RX ADMIN — OXYCODONE HYDROCHLORIDE 5 MG: 5 TABLET ORAL at 23:33

## 2024-01-01 RX ADMIN — LEVOTHYROXINE SODIUM 200 MCG: 0.2 TABLET ORAL at 04:45

## 2024-01-01 RX ADMIN — PROPOFOL 35 MCG/KG/MIN: 10 INJECTION, EMULSION INTRAVENOUS at 06:47

## 2024-01-01 RX ADMIN — FERROUS SULFATE TAB 325 MG (65 MG ELEMENTAL FE) 1 TABLET: 325 (65 FE) TAB at 08:33

## 2024-01-01 RX ADMIN — NYSTATIN 500000 UNITS: 100000 SUSPENSION ORAL at 11:58

## 2024-01-01 RX ADMIN — POTASSIUM & SODIUM PHOSPHATES POWDER PACK 280-160-250 MG 1 PACKET: 280-160-250 PACK at 09:22

## 2024-01-01 RX ADMIN — Medication 5 MG: at 18:25

## 2024-01-01 RX ADMIN — MILRINONE LACTATE 0.25 MCG/KG/MIN: 200 INJECTION, SOLUTION INTRAVENOUS at 14:56

## 2024-01-01 RX ADMIN — DIBASIC SODIUM PHOSPHATE, MONOBASIC POTASSIUM PHOSPHATE AND MONOBASIC SODIUM PHOSPHATE 500 MG: 852; 155; 130 TABLET ORAL at 20:04

## 2024-01-01 RX ADMIN — TACROLIMUS 2 MG: 1 CAPSULE ORAL at 18:10

## 2024-01-01 RX ADMIN — ONDANSETRON 4 MG: 2 INJECTION, SOLUTION INTRAMUSCULAR; INTRAVENOUS at 09:15

## 2024-01-01 RX ADMIN — TRAZODONE HYDROCHLORIDE 50 MG: 50 TABLET ORAL at 20:04

## 2024-01-01 RX ADMIN — HYDROMORPHONE HYDROCHLORIDE 0.2 MG: 1 INJECTION, SOLUTION INTRAMUSCULAR; INTRAVENOUS; SUBCUTANEOUS at 04:20

## 2024-01-01 RX ADMIN — ONDANSETRON 4 MG: 2 INJECTION INTRAMUSCULAR; INTRAVENOUS at 13:13

## 2024-01-01 RX ADMIN — ACETAMINOPHEN 650 MG: 325 TABLET ORAL at 17:21

## 2024-01-01 RX ADMIN — CALCITRIOL 0.5 MCG: 1 SOLUTION ORAL at 08:31

## 2024-01-01 RX ADMIN — ONDANSETRON 4 MG: 2 INJECTION INTRAMUSCULAR; INTRAVENOUS at 20:52

## 2024-01-01 RX ADMIN — SENNOSIDES AND DOCUSATE SODIUM 1 TABLET: 8.6; 5 TABLET ORAL at 08:05

## 2024-01-01 RX ADMIN — INSULIN LISPRO 2 UNITS: 100 INJECTION, SOLUTION INTRAVENOUS; SUBCUTANEOUS at 09:57

## 2024-01-01 RX ADMIN — PANTOPRAZOLE SODIUM 40 MG: 40 INJECTION, POWDER, FOR SOLUTION INTRAVENOUS at 09:13

## 2024-01-01 RX ADMIN — MIDAZOLAM HYDROCHLORIDE 2 MG: 1 INJECTION, SOLUTION INTRAMUSCULAR; INTRAVENOUS at 16:17

## 2024-01-01 RX ADMIN — NYSTATIN 500000 UNITS: 100000 SUSPENSION ORAL at 06:32

## 2024-01-01 RX ADMIN — OXYCODONE HYDROCHLORIDE 5 MG: 5 TABLET ORAL at 03:10

## 2024-01-01 RX ADMIN — LIDOCAINE 1 PATCH: 4 PATCH TOPICAL at 20:46

## 2024-01-01 RX ADMIN — LIDOCAINE 1 PATCH: 4 PATCH TOPICAL at 09:11

## 2024-01-01 RX ADMIN — Medication 50 PERCENT: at 21:26

## 2024-01-01 RX ADMIN — NYSTATIN 500000 UNITS: 100000 SUSPENSION ORAL at 01:11

## 2024-01-01 RX ADMIN — MILRINONE LACTATE 0.25 MCG/KG/MIN: 200 INJECTION, SOLUTION INTRAVENOUS at 00:18

## 2024-01-01 RX ADMIN — VALGANCICLOVIR HYDROCHLORIDE 450 MG: 450 TABLET ORAL at 15:56

## 2024-01-01 RX ADMIN — CLEVIPIDINE 10 MG/HR: 0.5 EMULSION INTRAVENOUS at 21:00

## 2024-01-01 RX ADMIN — HYDROMORPHONE HYDROCHLORIDE 0.2 MG: 1 INJECTION, SOLUTION INTRAMUSCULAR; INTRAVENOUS; SUBCUTANEOUS at 02:27

## 2024-01-01 RX ADMIN — NYSTATIN 500000 UNITS: 100000 SUSPENSION ORAL at 11:31

## 2024-01-01 RX ADMIN — ROSUVASTATIN 40 MG: 40 TABLET, FILM COATED ORAL at 20:21

## 2024-01-01 RX ADMIN — SIROLIMUS 2.5 MG: 1 TABLET ORAL at 06:13

## 2024-01-01 RX ADMIN — NYSTATIN 500000 UNITS: 100000 SUSPENSION ORAL at 12:22

## 2024-01-01 RX ADMIN — GLYCOPYRROLATE 0.6 MG: 0.2 INJECTION INTRAMUSCULAR; INTRAVENOUS at 18:05

## 2024-01-01 RX ADMIN — SENNOSIDES AND DOCUSATE SODIUM 1 TABLET: 8.6; 5 TABLET ORAL at 20:06

## 2024-01-01 RX ADMIN — DIBASIC SODIUM PHOSPHATE, MONOBASIC POTASSIUM PHOSPHATE AND MONOBASIC SODIUM PHOSPHATE 500 MG: 852; 155; 130 TABLET ORAL at 16:02

## 2024-01-01 RX ADMIN — ROSUVASTATIN 40 MG: 40 TABLET, FILM COATED ORAL at 20:23

## 2024-01-01 RX ADMIN — MAGNESIUM SULFATE HEPTAHYDRATE 1 G: 1 INJECTION, SOLUTION INTRAVENOUS at 08:38

## 2024-01-01 RX ADMIN — HEPARIN SODIUM 10 ML/HR: 1000 INJECTION INTRAVENOUS; SUBCUTANEOUS at 03:14

## 2024-01-01 RX ADMIN — CALCIUM GLUCONATE 1 G: 20 INJECTION, SOLUTION INTRAVENOUS at 07:22

## 2024-01-01 RX ADMIN — Medication 1 TABLET: at 08:10

## 2024-01-01 RX ADMIN — NYSTATIN 500000 UNITS: 100000 SUSPENSION ORAL at 17:50

## 2024-01-01 RX ADMIN — MILRINONE LACTATE 0.25 MCG/KG/MIN: 200 INJECTION, SOLUTION INTRAVENOUS at 21:01

## 2024-01-01 RX ADMIN — Medication 10 MG: at 20:03

## 2024-01-01 RX ADMIN — NYSTATIN 500000 UNITS: 100000 SUSPENSION ORAL at 06:04

## 2024-01-01 RX ADMIN — NEOSTIGMINE METHYLSULFATE 4 MG: 1 INJECTION INTRAVENOUS at 18:05

## 2024-01-01 RX ADMIN — CYANOCOBALAMIN TAB 1000 MCG 500 MCG: 1000 TAB at 11:56

## 2024-01-01 RX ADMIN — LEVOTHYROXINE SODIUM 250 MCG: 0.2 TABLET ORAL at 06:33

## 2024-01-01 RX ADMIN — FERROUS SULFATE TAB 325 MG (65 MG ELEMENTAL FE) 1 TABLET: 325 (65 FE) TAB at 08:27

## 2024-01-01 RX ADMIN — LIDOCAINE 1 PATCH: 4 PATCH TOPICAL at 08:23

## 2024-01-01 RX ADMIN — CALCIUM CHLORIDE, MAGNESIUM CHLORIDE, DEXTROSE MONOHYDRATE, LACTIC ACID, SODIUM CHLORIDE, SODIUM BICARBONATE AND POTASSIUM CHLORIDE 2400 ML/HR: 3.68; 3.05; 22; 5.4; 6.46; 3.09; .314 INJECTION INTRAVENOUS at 12:39

## 2024-01-01 RX ADMIN — POLYETHYLENE GLYCOL 3350 17 G: 17 POWDER, FOR SOLUTION ORAL at 11:56

## 2024-01-01 RX ADMIN — CALCIUM GLUCONATE 1 G: 20 INJECTION, SOLUTION INTRAVENOUS at 13:50

## 2024-01-01 RX ADMIN — ASPIRIN 81 MG CHEWABLE TABLET 81 MG: 81 TABLET CHEWABLE at 08:31

## 2024-01-01 RX ADMIN — LEVOTHYROXINE SODIUM 200 MCG: 0.2 TABLET ORAL at 03:37

## 2024-01-01 RX ADMIN — FOLIC ACID 1 MG: 1 TABLET ORAL at 09:17

## 2024-01-01 RX ADMIN — FERROUS SULFATE TAB 325 MG (65 MG ELEMENTAL FE) 1 TABLET: 325 (65 FE) TAB at 08:24

## 2024-01-01 RX ADMIN — OXYCODONE HYDROCHLORIDE 5 MG: 5 TABLET ORAL at 15:59

## 2024-01-01 RX ADMIN — FOLIC ACID 1 MG: 1 TABLET ORAL at 09:07

## 2024-01-01 RX ADMIN — HEPARIN SODIUM 5000 UNITS: 5000 INJECTION INTRAVENOUS; SUBCUTANEOUS at 18:19

## 2024-01-01 RX ADMIN — MILRINONE LACTATE 0.25 MCG/KG/MIN: 200 INJECTION, SOLUTION INTRAVENOUS at 02:26

## 2024-01-01 RX ADMIN — ONDANSETRON 4 MG: 2 INJECTION INTRAMUSCULAR; INTRAVENOUS at 16:00

## 2024-01-01 RX ADMIN — ACETAMINOPHEN 650 MG: 325 TABLET ORAL at 15:10

## 2024-01-01 RX ADMIN — HYDROMORPHONE HYDROCHLORIDE 0.2 MG: 1 INJECTION, SOLUTION INTRAMUSCULAR; INTRAVENOUS; SUBCUTANEOUS at 19:50

## 2024-01-01 RX ADMIN — HEPARIN SODIUM 5000 UNITS: 5000 INJECTION INTRAVENOUS; SUBCUTANEOUS at 18:04

## 2024-01-01 RX ADMIN — CEFAZOLIN SODIUM 2 G: 2 INJECTION, SOLUTION INTRAVENOUS at 16:49

## 2024-01-01 RX ADMIN — LIDOCAINE 1 PATCH: 4 PATCH TOPICAL at 08:17

## 2024-01-01 RX ADMIN — HEPARIN SODIUM 5000 UNITS: 5000 INJECTION INTRAVENOUS; SUBCUTANEOUS at 17:20

## 2024-01-01 RX ADMIN — TRAZODONE HYDROCHLORIDE 50 MG: 50 TABLET ORAL at 20:35

## 2024-01-01 RX ADMIN — NYSTATIN 500000 UNITS: 100000 SUSPENSION ORAL at 17:39

## 2024-01-01 RX ADMIN — MYCOPHENOLIC ACID 180 MG: 180 TABLET, DELAYED RELEASE ORAL at 19:00

## 2024-01-01 RX ADMIN — LEVOTHYROXINE SODIUM 200 MCG: 0.2 TABLET ORAL at 04:46

## 2024-01-01 RX ADMIN — DIPHENHYDRAMINE HYDROCHLORIDE 25 MG: 25 CAPSULE ORAL at 17:20

## 2024-01-01 RX ADMIN — INSULIN LISPRO 4 UNITS: 100 INJECTION, SOLUTION INTRAVENOUS; SUBCUTANEOUS at 20:47

## 2024-01-01 RX ADMIN — FUROSEMIDE 80 MG: 10 INJECTION, SOLUTION INTRAMUSCULAR; INTRAVENOUS at 16:27

## 2024-01-01 RX ADMIN — TRAZODONE HYDROCHLORIDE 50 MG: 50 TABLET ORAL at 20:00

## 2024-01-01 RX ADMIN — PERFLUTREN 10 ML OF DILUTION: 6.52 INJECTION, SUSPENSION INTRAVENOUS at 11:15

## 2024-01-01 RX ADMIN — CALCITRIOL 0.5 MCG: 1 SOLUTION ORAL at 08:44

## 2024-01-01 RX ADMIN — ONDANSETRON 4 MG: 2 INJECTION INTRAMUSCULAR; INTRAVENOUS at 17:42

## 2024-01-01 RX ADMIN — CALCIUM GLUCONATE 2 G: 20 INJECTION, SOLUTION INTRAVENOUS at 19:00

## 2024-01-01 RX ADMIN — LEVOTHYROXINE SODIUM 200 MCG: 0.2 TABLET ORAL at 05:15

## 2024-01-01 RX ADMIN — VANCOMYCIN HYDROCHLORIDE 1500 MG: 1 INJECTION, POWDER, LYOPHILIZED, FOR SOLUTION INTRAVENOUS at 16:49

## 2024-01-01 RX ADMIN — POTASSIUM CHLORIDE 40 MEQ: 1500 TABLET, EXTENDED RELEASE ORAL at 09:40

## 2024-01-01 RX ADMIN — SULFAMETHOXAZOLE AND TRIMETHOPRIM 80 MG OF TRIMETHOPRIM: 200; 40 SUSPENSION ORAL at 09:11

## 2024-01-01 RX ADMIN — HYDROXYZINE HYDROCHLORIDE 25 MG: 25 TABLET, FILM COATED ORAL at 21:10

## 2024-01-01 RX ADMIN — ISOSORBIDE DINITRATE 20 MG: 20 TABLET ORAL at 18:28

## 2024-01-01 RX ADMIN — SENNOSIDES AND DOCUSATE SODIUM 2 TABLET: 8.6; 5 TABLET ORAL at 21:04

## 2024-01-01 RX ADMIN — FERROUS SULFATE TAB 325 MG (65 MG ELEMENTAL FE) 1 TABLET: 325 (65 FE) TAB at 08:43

## 2024-01-01 RX ADMIN — ISOSORBIDE DINITRATE 40 MG: 10 TABLET ORAL at 16:51

## 2024-01-01 RX ADMIN — NYSTATIN 500000 UNITS: 100000 SUSPENSION ORAL at 05:51

## 2024-01-01 RX ADMIN — MYCOPHENOLIC ACID 180 MG: 180 TABLET, DELAYED RELEASE ORAL at 06:33

## 2024-01-01 RX ADMIN — ACETAMINOPHEN 650 MG: 325 TABLET ORAL at 07:45

## 2024-01-01 RX ADMIN — ONDANSETRON 4 MG: 2 INJECTION INTRAMUSCULAR; INTRAVENOUS at 21:26

## 2024-01-01 RX ADMIN — CALCIUM GLUCONATE 1 G: 20 INJECTION, SOLUTION INTRAVENOUS at 07:44

## 2024-01-01 RX ADMIN — POTASSIUM CHLORIDE 40 MEQ: 29.8 INJECTION, SOLUTION INTRAVENOUS at 05:55

## 2024-01-01 RX ADMIN — HYDRALAZINE HYDROCHLORIDE 20 MG: 20 INJECTION INTRAMUSCULAR; INTRAVENOUS at 03:33

## 2024-01-01 RX ADMIN — HEPARIN SODIUM 10 ML/HR: 1000 INJECTION INTRAVENOUS; SUBCUTANEOUS at 23:11

## 2024-01-01 RX ADMIN — PROPOFOL 35 MCG/KG/MIN: 10 INJECTION, EMULSION INTRAVENOUS at 02:52

## 2024-01-01 RX ADMIN — FOLIC ACID 1 MG: 1 TABLET ORAL at 08:10

## 2024-01-01 RX ADMIN — PANTOPRAZOLE SODIUM 40 MG: 40 TABLET, DELAYED RELEASE ORAL at 09:22

## 2024-01-01 RX ADMIN — ALBUMIN HUMAN 25 G: 0.25 SOLUTION INTRAVENOUS at 15:44

## 2024-01-01 RX ADMIN — ACETAMINOPHEN 650 MG: 325 TABLET ORAL at 21:10

## 2024-01-01 RX ADMIN — PIPERACILLIN SODIUM AND TAZOBACTAM SODIUM 3.38 G: 3; .375 INJECTION, SOLUTION INTRAVENOUS at 07:40

## 2024-01-01 RX ADMIN — LEVOTHYROXINE SODIUM 200 MCG: 0.2 TABLET ORAL at 04:22

## 2024-01-01 RX ADMIN — POTASSIUM & SODIUM PHOSPHATES POWDER PACK 280-160-250 MG 1 PACKET: 280-160-250 PACK at 13:36

## 2024-01-01 RX ADMIN — QUETIAPINE 50 MG: 25 TABLET ORAL at 20:59

## 2024-01-01 RX ADMIN — NYSTATIN 500000 UNITS: 100000 SUSPENSION ORAL at 00:00

## 2024-01-01 RX ADMIN — CYANOCOBALAMIN TAB 1000 MCG 500 MCG: 1000 TAB at 09:28

## 2024-01-01 RX ADMIN — LEVOTHYROXINE SODIUM 250 MCG: 0.2 TABLET ORAL at 06:37

## 2024-01-01 RX ADMIN — SODIUM NITROPRUSSIDE 1.4 MCG/KG/MIN: 0.5 INJECTION, SOLUTION INTRAVENOUS at 21:07

## 2024-01-01 RX ADMIN — TRAZODONE HYDROCHLORIDE 50 MG: 50 TABLET ORAL at 21:18

## 2024-01-01 RX ADMIN — INSULIN LISPRO 2 UNITS: 100 INJECTION, SOLUTION INTRAVENOUS; SUBCUTANEOUS at 08:21

## 2024-01-01 RX ADMIN — CALCIUM GLUCONATE 1 G: 20 INJECTION, SOLUTION INTRAVENOUS at 02:02

## 2024-01-01 RX ADMIN — ACETAMINOPHEN 650 MG: 325 TABLET ORAL at 03:26

## 2024-01-01 RX ADMIN — MYCOPHENOLIC ACID 360 MG: 360 TABLET, DELAYED RELEASE ORAL at 18:26

## 2024-01-01 RX ADMIN — INSULIN HUMAN 3 UNITS: 100 INJECTION, SOLUTION PARENTERAL at 06:27

## 2024-01-01 RX ADMIN — POTASSIUM CHLORIDE 20 MEQ: 14.9 INJECTION, SOLUTION INTRAVENOUS at 14:15

## 2024-01-01 RX ADMIN — Medication 1 TABLET: at 08:14

## 2024-01-01 RX ADMIN — METHOCARBAMOL 500 MG: 500 TABLET ORAL at 12:14

## 2024-01-01 RX ADMIN — VANCOMYCIN HYDROCHLORIDE 750 MG: 750 INJECTION, SOLUTION INTRAVENOUS at 09:21

## 2024-01-01 RX ADMIN — BIVALIRUDIN 0.09 MG/KG/HR: 250 INJECTION, POWDER, LYOPHILIZED, FOR SOLUTION INTRAVENOUS at 06:47

## 2024-01-01 RX ADMIN — HYDROXYZINE HYDROCHLORIDE 25 MG: 25 TABLET, FILM COATED ORAL at 14:00

## 2024-01-01 RX ADMIN — HEPARIN SODIUM 5000 UNITS: 5000 INJECTION INTRAVENOUS; SUBCUTANEOUS at 10:09

## 2024-01-01 RX ADMIN — PREDNISONE 10 MG: 10 TABLET ORAL at 09:00

## 2024-01-01 RX ADMIN — LIDOCAINE 1 PATCH: 4 PATCH TOPICAL at 09:38

## 2024-01-01 RX ADMIN — NYSTATIN 500000 UNITS: 100000 SUSPENSION ORAL at 12:01

## 2024-01-01 RX ADMIN — INSULIN LISPRO 2 UNITS: 100 INJECTION, SOLUTION INTRAVENOUS; SUBCUTANEOUS at 10:00

## 2024-01-01 RX ADMIN — PREDNISONE 10 MG: 10 TABLET ORAL at 09:07

## 2024-01-01 RX ADMIN — INSULIN LISPRO 3 UNITS: 100 INJECTION, SOLUTION INTRAVENOUS; SUBCUTANEOUS at 10:02

## 2024-01-01 RX ADMIN — Medication 1 TABLET: at 08:43

## 2024-01-01 RX ADMIN — HEPARIN SODIUM 10 ML/HR: 1000 INJECTION INTRAVENOUS; SUBCUTANEOUS at 02:04

## 2024-01-01 RX ADMIN — Medication 1 TABLET: at 08:16

## 2024-01-01 RX ADMIN — THIAMINE HYDROCHLORIDE 500 MG: 100 INJECTION, SOLUTION INTRAMUSCULAR; INTRAVENOUS at 08:20

## 2024-01-01 RX ADMIN — HYDROMORPHONE HYDROCHLORIDE 0.2 MG: 1 INJECTION, SOLUTION INTRAMUSCULAR; INTRAVENOUS; SUBCUTANEOUS at 03:41

## 2024-01-01 RX ADMIN — LIDOCAINE 1 PATCH: 4 PATCH TOPICAL at 08:02

## 2024-01-01 RX ADMIN — HEPARIN SODIUM 5000 UNITS: 5000 INJECTION INTRAVENOUS; SUBCUTANEOUS at 08:06

## 2024-01-01 RX ADMIN — ROSUVASTATIN 40 MG: 40 TABLET, FILM COATED ORAL at 20:36

## 2024-01-01 RX ADMIN — SIROLIMUS 3 MG: 2 TABLET ORAL at 06:21

## 2024-01-01 RX ADMIN — ACETAMINOPHEN 975 MG: 325 TABLET ORAL at 11:42

## 2024-01-01 RX ADMIN — OXYCODONE HYDROCHLORIDE 5 MG: 5 TABLET ORAL at 23:26

## 2024-01-01 RX ADMIN — CALCIUM GLUCONATE 1 G: 20 INJECTION, SOLUTION INTRAVENOUS at 07:32

## 2024-01-01 RX ADMIN — SODIUM BICARBONATE 10 ML/HR: 84 INJECTION, SOLUTION INTRAVENOUS at 13:53

## 2024-01-01 RX ADMIN — CEPHALEXIN 500 MG: 250 FOR SUSPENSION ORAL at 09:19

## 2024-01-01 RX ADMIN — Medication 10 MG: at 18:01

## 2024-01-01 RX ADMIN — HYDROMORPHONE HYDROCHLORIDE 0.2 MG: 1 INJECTION, SOLUTION INTRAMUSCULAR; INTRAVENOUS; SUBCUTANEOUS at 13:13

## 2024-01-01 RX ADMIN — SENNOSIDES AND DOCUSATE SODIUM 2 TABLET: 8.6; 5 TABLET ORAL at 20:32

## 2024-01-01 RX ADMIN — TACROLIMUS 1.5 MG: 1 CAPSULE ORAL at 18:26

## 2024-01-01 RX ADMIN — PROPOFOL 50 MCG/KG/MIN: 10 INJECTION, EMULSION INTRAVENOUS at 01:10

## 2024-01-01 RX ADMIN — ONDANSETRON 4 MG: 2 INJECTION INTRAMUSCULAR; INTRAVENOUS at 20:26

## 2024-01-01 RX ADMIN — Medication 8 MG/HR: at 18:08

## 2024-01-01 RX ADMIN — HYDROCORTISONE SODIUM SUCCINATE 25 MG: 100 INJECTION, POWDER, FOR SOLUTION INTRAMUSCULAR; INTRAVENOUS at 11:38

## 2024-01-01 RX ADMIN — PIPERACILLIN SODIUM AND TAZOBACTAM SODIUM 4.5 G: 4; .5 INJECTION, SOLUTION INTRAVENOUS at 09:49

## 2024-01-01 RX ADMIN — POTASSIUM CHLORIDE 40 MEQ: 29.8 INJECTION INTRAVENOUS at 05:55

## 2024-01-01 RX ADMIN — MILRINONE LACTATE 0.38 MCG/KG/MIN: 200 INJECTION, SOLUTION INTRAVENOUS at 11:30

## 2024-01-01 RX ADMIN — ACETAMINOPHEN 650 MG: 325 TABLET ORAL at 10:25

## 2024-01-01 RX ADMIN — FERROUS SULFATE TAB 325 MG (65 MG ELEMENTAL FE) 1 TABLET: 325 (65 FE) TAB at 09:29

## 2024-01-01 RX ADMIN — ALBUMIN HUMAN 25 G: 0.25 SOLUTION INTRAVENOUS at 20:45

## 2024-01-01 RX ADMIN — HEPARIN SODIUM 10000 UNITS: 1000 INJECTION INTRAVENOUS; SUBCUTANEOUS at 05:14

## 2024-01-01 RX ADMIN — MILRINONE LACTATE 0.25 MCG/KG/MIN: 200 INJECTION, SOLUTION INTRAVENOUS at 01:10

## 2024-01-01 RX ADMIN — SIROLIMUS 2 MG: 1 SOLUTION ORAL at 08:28

## 2024-01-01 RX ADMIN — PREDNISONE 10 MG: 10 TABLET ORAL at 08:39

## 2024-01-01 RX ADMIN — MILRINONE LACTATE 0.25 MCG/KG/MIN: 200 INJECTION, SOLUTION INTRAVENOUS at 20:35

## 2024-01-01 RX ADMIN — Medication 1 TABLET: at 09:34

## 2024-01-01 RX ADMIN — ACETAMINOPHEN 975 MG: 325 TABLET ORAL at 08:39

## 2024-01-01 RX ADMIN — ACETAMINOPHEN 975 MG: 325 TABLET ORAL at 14:44

## 2024-01-01 RX ADMIN — MILRINONE LACTATE 0.12 MCG/KG/MIN: 200 INJECTION, SOLUTION INTRAVENOUS at 06:12

## 2024-01-01 RX ADMIN — HEPARIN SODIUM 1000 UNITS/HR: 10000 INJECTION, SOLUTION INTRAVENOUS at 19:15

## 2024-01-01 RX ADMIN — TACROLIMUS 1 MG: 1 CAPSULE ORAL at 18:21

## 2024-01-01 RX ADMIN — MYCOPHENOLIC ACID 180 MG: 180 TABLET, DELAYED RELEASE ORAL at 18:47

## 2024-01-01 RX ADMIN — INSULIN LISPRO 2 UNITS: 100 INJECTION, SOLUTION INTRAVENOUS; SUBCUTANEOUS at 16:31

## 2024-01-01 RX ADMIN — INSULIN LISPRO 15 UNITS: 100 INJECTION, SOLUTION INTRAVENOUS; SUBCUTANEOUS at 12:18

## 2024-01-01 RX ADMIN — ISOSORBIDE DINITRATE 20 MG: 20 TABLET ORAL at 08:24

## 2024-01-01 RX ADMIN — SALINE NASAL SPRAY 5 SPRAY: 1.5 SOLUTION NASAL at 21:22

## 2024-01-01 RX ADMIN — OXYCODONE HYDROCHLORIDE 5 MG: 5 TABLET ORAL at 20:04

## 2024-01-01 RX ADMIN — PANTOPRAZOLE SODIUM 40 MG: 40 TABLET, DELAYED RELEASE ORAL at 16:42

## 2024-01-01 RX ADMIN — HYDROXYZINE HYDROCHLORIDE 25 MG: 25 TABLET, FILM COATED ORAL at 07:45

## 2024-01-01 RX ADMIN — ISOSORBIDE DINITRATE 40 MG: 10 TABLET ORAL at 08:20

## 2024-01-01 RX ADMIN — CALCITRIOL 0.5 MCG: 1 SOLUTION ORAL at 09:03

## 2024-01-01 RX ADMIN — ROSUVASTATIN CALCIUM 10 MG: 10 TABLET, FILM COATED ORAL at 20:03

## 2024-01-01 RX ADMIN — MICAFUNGIN SODIUM 100 MG: 100 INJECTION, POWDER, LYOPHILIZED, FOR SOLUTION INTRAVENOUS at 09:56

## 2024-01-01 RX ADMIN — OXYCODONE HYDROCHLORIDE 10 MG: 5 TABLET ORAL at 00:10

## 2024-01-01 RX ADMIN — FERROUS SULFATE TAB 325 MG (65 MG ELEMENTAL FE) 1 TABLET: 325 (65 FE) TAB at 06:37

## 2024-01-01 RX ADMIN — INSULIN LISPRO 5 UNITS: 100 INJECTION, SOLUTION INTRAVENOUS; SUBCUTANEOUS at 12:12

## 2024-01-01 RX ADMIN — CALCITRIOL 0.5 MCG: 1 SOLUTION ORAL at 08:45

## 2024-01-01 RX ADMIN — TRAZODONE HYDROCHLORIDE 50 MG: 50 TABLET ORAL at 21:54

## 2024-01-01 RX ADMIN — SODIUM CHLORIDE, POTASSIUM CHLORIDE, SODIUM LACTATE AND CALCIUM CHLORIDE 10 ML/HR: 600; 310; 30; 20 INJECTION, SOLUTION INTRAVENOUS at 14:57

## 2024-01-01 RX ADMIN — SENNOSIDES AND DOCUSATE SODIUM 2 TABLET: 8.6; 5 TABLET ORAL at 09:07

## 2024-01-01 RX ADMIN — TACROLIMUS 4 MG: 1 CAPSULE ORAL at 18:16

## 2024-01-01 RX ADMIN — SULFAMETHOXAZOLE AND TRIMETHOPRIM 80 MG OF TRIMETHOPRIM: 200; 40 SUSPENSION ORAL at 08:15

## 2024-01-01 RX ADMIN — MILRINONE LACTATE 0.25 MCG/KG/MIN: 200 INJECTION, SOLUTION INTRAVENOUS at 06:08

## 2024-01-01 RX ADMIN — MAGNESIUM SULFATE HEPTAHYDRATE 2 G: 40 INJECTION, SOLUTION INTRAVENOUS at 21:04

## 2024-01-01 RX ADMIN — ONDANSETRON 4 MG: 2 INJECTION INTRAMUSCULAR; INTRAVENOUS at 20:37

## 2024-01-01 RX ADMIN — TACROLIMUS 2 MG: 1 CAPSULE ORAL at 06:32

## 2024-01-01 RX ADMIN — OXYCODONE HYDROCHLORIDE 10 MG: 5 TABLET ORAL at 20:10

## 2024-01-01 RX ADMIN — DIPHENHYDRAMINE HYDROCHLORIDE 25 MG: 25 CAPSULE ORAL at 16:43

## 2024-01-01 RX ADMIN — OXYCODONE HYDROCHLORIDE 5 MG: 5 TABLET ORAL at 21:24

## 2024-01-01 RX ADMIN — LIDOCAINE 1 PATCH: 4 PATCH TOPICAL at 09:18

## 2024-01-01 RX ADMIN — INSULIN LISPRO 6 UNITS: 100 INJECTION, SOLUTION INTRAVENOUS; SUBCUTANEOUS at 00:02

## 2024-01-01 RX ADMIN — PROPOFOL 35 MCG/KG/MIN: 10 INJECTION, EMULSION INTRAVENOUS at 22:47

## 2024-01-01 RX ADMIN — PANTOPRAZOLE SODIUM 40 MG: 40 TABLET, DELAYED RELEASE ORAL at 07:00

## 2024-01-01 RX ADMIN — NYSTATIN 500000 UNITS: 100000 SUSPENSION ORAL at 17:57

## 2024-01-01 RX ADMIN — HYDRALAZINE HYDROCHLORIDE 50 MG: 50 TABLET ORAL at 16:47

## 2024-01-01 RX ADMIN — SALINE NASAL SPRAY 1 SPRAY: 1.5 SOLUTION NASAL at 18:08

## 2024-01-01 RX ADMIN — TACROLIMUS 3.5 MG: 1 CAPSULE ORAL at 18:29

## 2024-01-01 RX ADMIN — CLEVIPIDINE 4 MG/HR: 0.5 EMULSION INTRAVENOUS at 19:55

## 2024-01-01 RX ADMIN — OXYCODONE HYDROCHLORIDE 5 MG: 5 TABLET ORAL at 04:04

## 2024-01-01 RX ADMIN — Medication 10 MG: at 20:21

## 2024-01-01 RX ADMIN — CALCIUM GLUCONATE 1 G: 20 INJECTION, SOLUTION INTRAVENOUS at 08:21

## 2024-01-01 RX ADMIN — FERROUS SULFATE TAB 325 MG (65 MG ELEMENTAL FE) 1 TABLET: 325 (65 FE) TAB at 08:00

## 2024-01-01 RX ADMIN — Medication 40 PERCENT: at 20:00

## 2024-01-01 RX ADMIN — ACETAMINOPHEN 650 MG: 325 TABLET ORAL at 14:14

## 2024-01-01 RX ADMIN — Medication 0.06 MCG/KG/MIN: at 15:00

## 2024-01-01 RX ADMIN — INSULIN HUMAN 3 UNITS: 100 INJECTION, SOLUTION PARENTERAL at 13:17

## 2024-01-01 RX ADMIN — IRON SUCROSE 200 MG: 20 INJECTION, SOLUTION INTRAVENOUS at 18:03

## 2024-01-01 RX ADMIN — PREDNISONE 10 MG: 10 TABLET ORAL at 08:21

## 2024-01-01 RX ADMIN — CALCIUM CHLORIDE 0.3 G: 100 INJECTION, SOLUTION INTRAVENOUS at 16:27

## 2024-01-01 RX ADMIN — SENNOSIDES AND DOCUSATE SODIUM 1 TABLET: 8.6; 5 TABLET ORAL at 20:07

## 2024-01-01 RX ADMIN — HEPARIN SODIUM 5000 UNITS: 5000 INJECTION INTRAVENOUS; SUBCUTANEOUS at 16:03

## 2024-01-01 RX ADMIN — NYSTATIN 500000 UNITS: 100000 SUSPENSION ORAL at 23:55

## 2024-01-01 RX ADMIN — POTASSIUM CHLORIDE 20 MEQ: 1.5 POWDER, FOR SOLUTION ORAL at 13:26

## 2024-01-01 RX ADMIN — INSULIN LISPRO 1 UNITS: 100 INJECTION, SOLUTION INTRAVENOUS; SUBCUTANEOUS at 08:43

## 2024-01-01 RX ADMIN — CALCIUM GLUCONATE 2 G: 20 INJECTION, SOLUTION INTRAVENOUS at 15:35

## 2024-01-01 RX ADMIN — Medication 2 G: at 08:33

## 2024-01-01 RX ADMIN — HYDROMORPHONE HYDROCHLORIDE 0.2 MG: 1 INJECTION, SOLUTION INTRAMUSCULAR; INTRAVENOUS; SUBCUTANEOUS at 13:36

## 2024-01-01 RX ADMIN — HEPARIN SODIUM 5000 UNITS: 5000 INJECTION INTRAVENOUS; SUBCUTANEOUS at 01:04

## 2024-01-01 RX ADMIN — PROPOFOL 50 MCG/KG/MIN: 10 INJECTION, EMULSION INTRAVENOUS at 04:58

## 2024-01-01 RX ADMIN — FOLIC ACID 1 MG: 1 TABLET ORAL at 08:16

## 2024-01-01 RX ADMIN — OXYCODONE HYDROCHLORIDE 5 MG: 5 TABLET ORAL at 23:46

## 2024-01-01 RX ADMIN — SIROLIMUS 0.5 MG: 1 SOLUTION ORAL at 14:04

## 2024-01-01 RX ADMIN — ISOSORBIDE DINITRATE 10 MG: 10 TABLET ORAL at 08:44

## 2024-01-01 RX ADMIN — NYSTATIN 500000 UNITS: 100000 SUSPENSION ORAL at 00:50

## 2024-01-01 RX ADMIN — HYDROXYZINE HYDROCHLORIDE 25 MG: 25 TABLET ORAL at 12:17

## 2024-01-01 RX ADMIN — HYDROXYZINE HYDROCHLORIDE 25 MG: 25 TABLET, FILM COATED ORAL at 12:17

## 2024-01-01 RX ADMIN — SODIUM BICARBONATE 10 ML/HR: 84 INJECTION, SOLUTION INTRAVENOUS at 08:54

## 2024-01-01 RX ADMIN — ACETAMINOPHEN 650 MG: 325 TABLET ORAL at 19:43

## 2024-01-01 RX ADMIN — QUETIAPINE 50 MG: 25 TABLET ORAL at 20:07

## 2024-01-01 RX ADMIN — MYCOPHENOLIC ACID 360 MG: 180 TABLET, DELAYED RELEASE ORAL at 18:29

## 2024-01-01 RX ADMIN — HYDROMORPHONE HYDROCHLORIDE 0.2 MG: 1 INJECTION, SOLUTION INTRAMUSCULAR; INTRAVENOUS; SUBCUTANEOUS at 03:31

## 2024-01-01 RX ADMIN — VANCOMYCIN HYDROCHLORIDE 1000 MG: 1 INJECTION, SOLUTION INTRAVENOUS at 20:08

## 2024-01-01 RX ADMIN — ASPIRIN 81 MG CHEWABLE TABLET 81 MG: 81 TABLET CHEWABLE at 11:31

## 2024-01-01 RX ADMIN — Medication 1.5 MG: at 18:30

## 2024-01-01 RX ADMIN — HYDROMORPHONE HYDROCHLORIDE 0.4 MG: 1 INJECTION, SOLUTION INTRAMUSCULAR; INTRAVENOUS; SUBCUTANEOUS at 03:22

## 2024-01-01 RX ADMIN — IOHEXOL 80 ML: 350 INJECTION, SOLUTION INTRAVENOUS at 14:52

## 2024-01-01 RX ADMIN — PROPOFOL 30 MCG/KG/MIN: 10 INJECTION, EMULSION INTRAVENOUS at 16:58

## 2024-01-01 RX ADMIN — SALINE NASAL SPRAY 5 SPRAY: 1.5 SOLUTION NASAL at 17:22

## 2024-01-01 RX ADMIN — ROSUVASTATIN 40 MG: 40 TABLET, FILM COATED ORAL at 00:19

## 2024-01-01 RX ADMIN — QUETIAPINE 50 MG: 25 TABLET ORAL at 20:05

## 2024-01-01 RX ADMIN — SENNOSIDES AND DOCUSATE SODIUM 2 TABLET: 8.6; 5 TABLET ORAL at 20:59

## 2024-01-01 RX ADMIN — ETOMIDATE 31 MG: 2 INJECTION INTRAVENOUS at 02:38

## 2024-01-01 RX ADMIN — FOLIC ACID 1 MG: 1 TABLET ORAL at 09:18

## 2024-01-01 RX ADMIN — PREDNISONE 10 MG: 10 TABLET ORAL at 09:39

## 2024-01-01 RX ADMIN — SENNOSIDES AND DOCUSATE SODIUM 2 TABLET: 8.6; 5 TABLET ORAL at 09:25

## 2024-01-01 RX ADMIN — ISOSORBIDE DINITRATE 40 MG: 10 TABLET ORAL at 01:07

## 2024-01-01 RX ADMIN — PIPERACILLIN SODIUM AND TAZOBACTAM SODIUM 4.5 G: 4; .5 INJECTION, SOLUTION INTRAVENOUS at 04:57

## 2024-01-01 RX ADMIN — SALINE NASAL SPRAY 5 SPRAY: 1.5 SOLUTION NASAL at 16:37

## 2024-01-01 RX ADMIN — VANCOMYCIN HYDROCHLORIDE 1250 MG: 5 INJECTION, POWDER, LYOPHILIZED, FOR SOLUTION INTRAVENOUS at 07:22

## 2024-01-01 RX ADMIN — OXYCODONE HYDROCHLORIDE 5 MG: 5 TABLET ORAL at 06:10

## 2024-01-01 RX ADMIN — ISOSORBIDE DINITRATE 10 MG: 10 TABLET ORAL at 13:24

## 2024-01-01 RX ADMIN — ALBUMIN HUMAN 12.5 G: 0.05 INJECTION, SOLUTION INTRAVENOUS at 04:43

## 2024-01-01 RX ADMIN — TACROLIMUS 1 MG: 1 CAPSULE ORAL at 18:40

## 2024-01-01 RX ADMIN — HYDROMORPHONE HYDROCHLORIDE 0.2 MG: 1 INJECTION, SOLUTION INTRAMUSCULAR; INTRAVENOUS; SUBCUTANEOUS at 00:09

## 2024-01-01 RX ADMIN — CEPHALEXIN 500 MG: 250 FOR SUSPENSION ORAL at 11:59

## 2024-01-01 RX ADMIN — INSULIN LISPRO 2 UNITS: 100 INJECTION, SOLUTION INTRAVENOUS; SUBCUTANEOUS at 08:00

## 2024-01-01 RX ADMIN — BISACODYL 10 MG: 10 SUPPOSITORY RECTAL at 14:00

## 2024-01-01 RX ADMIN — ISOSORBIDE DINITRATE 40 MG: 10 TABLET ORAL at 00:00

## 2024-01-01 RX ADMIN — CLEVIPIDINE 10 MG/HR: 0.5 EMULSION INTRAVENOUS at 10:52

## 2024-01-01 RX ADMIN — NYSTATIN 500000 UNITS: 100000 SUSPENSION ORAL at 11:30

## 2024-01-01 RX ADMIN — OXYCODONE HYDROCHLORIDE 5 MG: 5 TABLET ORAL at 04:43

## 2024-01-01 RX ADMIN — FOLIC ACID 1 MG: 1 TABLET ORAL at 08:00

## 2024-01-01 RX ADMIN — CALCIUM CHLORIDE 0.2 G: 100 INJECTION, SOLUTION INTRAVENOUS at 16:34

## 2024-01-01 RX ADMIN — CALCIUM CHLORIDE, MAGNESIUM CHLORIDE, DEXTROSE MONOHYDRATE, LACTIC ACID, SODIUM CHLORIDE, SODIUM BICARBONATE AND POTASSIUM CHLORIDE 2400 ML/HR: 3.68; 3.05; 22; 5.4; 6.46; 3.09; .314 INJECTION INTRAVENOUS at 18:26

## 2024-01-01 RX ADMIN — NYSTATIN 500000 UNITS: 100000 SUSPENSION ORAL at 06:16

## 2024-01-01 RX ADMIN — ONDANSETRON 4 MG: 2 INJECTION INTRAMUSCULAR; INTRAVENOUS at 07:16

## 2024-01-01 RX ADMIN — ACETAMINOPHEN 975 MG: 325 TABLET ORAL at 13:34

## 2024-01-01 RX ADMIN — PERFLUTREN 2 ML OF DILUTION: 6.52 INJECTION, SUSPENSION INTRAVENOUS at 09:24

## 2024-01-01 RX ADMIN — ONDANSETRON 4 MG: 2 INJECTION INTRAMUSCULAR; INTRAVENOUS at 11:57

## 2024-01-01 RX ADMIN — HYDRALAZINE HYDROCHLORIDE 25 MG: 25 TABLET ORAL at 08:18

## 2024-01-01 RX ADMIN — POTASSIUM & SODIUM PHOSPHATES POWDER PACK 280-160-250 MG 2 PACKET: 280-160-250 PACK at 23:53

## 2024-01-01 RX ADMIN — POLYETHYLENE GLYCOL 3350 17 G: 17 POWDER, FOR SOLUTION ORAL at 21:13

## 2024-01-01 RX ADMIN — PIPERACILLIN SODIUM AND TAZOBACTAM SODIUM 3.38 G: 3; .375 INJECTION, SOLUTION INTRAVENOUS at 02:01

## 2024-01-01 RX ADMIN — NYSTATIN 500000 UNITS: 100000 SUSPENSION ORAL at 06:36

## 2024-01-01 RX ADMIN — HYDROMORPHONE HYDROCHLORIDE 0.2 MG: 1 INJECTION, SOLUTION INTRAMUSCULAR; INTRAVENOUS; SUBCUTANEOUS at 15:04

## 2024-01-01 RX ADMIN — ACETAMINOPHEN 975 MG: 325 TABLET ORAL at 06:07

## 2024-01-01 RX ADMIN — SENNOSIDES AND DOCUSATE SODIUM 2 TABLET: 8.6; 5 TABLET ORAL at 11:42

## 2024-01-01 RX ADMIN — CYCLOBENZAPRINE HYDROCHLORIDE 5 MG: 5 TABLET, FILM COATED ORAL at 20:00

## 2024-01-01 RX ADMIN — Medication 1.5 MG: at 06:15

## 2024-01-01 RX ADMIN — NYSTATIN 500000 UNITS: 100000 SUSPENSION ORAL at 18:23

## 2024-01-01 RX ADMIN — HEPARIN SODIUM 5000 UNITS: 5000 INJECTION INTRAVENOUS; SUBCUTANEOUS at 08:19

## 2024-01-01 RX ADMIN — METHOCARBAMOL 500 MG: 500 TABLET ORAL at 11:50

## 2024-01-01 RX ADMIN — ALBUMIN HUMAN 12.5 G: 50 SOLUTION INTRAVENOUS at 04:43

## 2024-01-01 RX ADMIN — ISOSORBIDE DINITRATE 40 MG: 10 TABLET ORAL at 18:03

## 2024-01-01 RX ADMIN — ROSUVASTATIN 40 MG: 40 TABLET, FILM COATED ORAL at 21:22

## 2024-01-01 RX ADMIN — Medication 0.02 MCG/KG/MIN: at 11:20

## 2024-01-01 RX ADMIN — HYDRALAZINE HYDROCHLORIDE 10 MG: 10 TABLET ORAL at 05:19

## 2024-01-01 RX ADMIN — PIPERACILLIN SODIUM AND TAZOBACTAM SODIUM 3.38 G: 3; .375 INJECTION, SOLUTION INTRAVENOUS at 20:08

## 2024-01-01 RX ADMIN — HEPARIN SODIUM 1200 UNITS: 1000 INJECTION INTRAVENOUS; SUBCUTANEOUS at 13:04

## 2024-01-01 RX ADMIN — NYSTATIN 500000 UNITS: 100000 SUSPENSION ORAL at 12:25

## 2024-01-01 RX ADMIN — TRAZODONE HYDROCHLORIDE 50 MG: 50 TABLET ORAL at 20:48

## 2024-01-01 RX ADMIN — MICAFUNGIN SODIUM 100 MG: 100 INJECTION, POWDER, LYOPHILIZED, FOR SOLUTION INTRAVENOUS at 10:02

## 2024-01-01 RX ADMIN — INSULIN LISPRO 1 UNITS: 100 INJECTION, SOLUTION INTRAVENOUS; SUBCUTANEOUS at 11:57

## 2024-01-01 RX ADMIN — ONDANSETRON 4 MG: 2 INJECTION INTRAMUSCULAR; INTRAVENOUS at 22:21

## 2024-01-01 RX ADMIN — INSULIN HUMAN 2 UNITS: 100 INJECTION, SOLUTION PARENTERAL at 12:16

## 2024-01-01 RX ADMIN — MICAFUNGIN SODIUM 100 MG: 100 INJECTION, POWDER, LYOPHILIZED, FOR SOLUTION INTRAVENOUS at 10:09

## 2024-01-01 RX ADMIN — HYDROMORPHONE HYDROCHLORIDE 0.2 MG: 1 INJECTION, SOLUTION INTRAMUSCULAR; INTRAVENOUS; SUBCUTANEOUS at 08:01

## 2024-01-01 RX ADMIN — Medication 4 L/MIN: at 20:00

## 2024-01-01 RX ADMIN — DEXMEDETOMIDINE HYDROCHLORIDE 0.5 MCG/KG/HR: 400 INJECTION INTRAVENOUS at 16:35

## 2024-01-01 RX ADMIN — HYDRALAZINE HYDROCHLORIDE 100 MG: 100 TABLET, FILM COATED ORAL at 16:42

## 2024-01-01 RX ADMIN — ACETAMINOPHEN 975 MG: 325 TABLET ORAL at 13:16

## 2024-01-01 RX ADMIN — SODIUM CHLORIDE, POTASSIUM CHLORIDE, SODIUM LACTATE AND CALCIUM CHLORIDE 5 ML/HR: 600; 310; 30; 20 INJECTION, SOLUTION INTRAVENOUS at 21:57

## 2024-01-01 RX ADMIN — PREDNISONE 10 MG: 10 TABLET ORAL at 09:17

## 2024-01-01 RX ADMIN — HYDROXYZINE HYDROCHLORIDE 25 MG: 25 TABLET, FILM COATED ORAL at 05:03

## 2024-01-01 RX ADMIN — Medication 15 ML: at 09:50

## 2024-01-01 RX ADMIN — FERROUS SULFATE TAB 325 MG (65 MG ELEMENTAL FE) 1 TABLET: 325 (65 FE) TAB at 08:47

## 2024-01-01 RX ADMIN — OXYCODONE HYDROCHLORIDE 5 MG: 5 TABLET ORAL at 16:14

## 2024-01-01 RX ADMIN — HYDRALAZINE HYDROCHLORIDE 100 MG: 100 TABLET, FILM COATED ORAL at 01:08

## 2024-01-01 RX ADMIN — DEXMEDETOMIDINE HYDROCHLORIDE 0.2 MCG/KG/HR: 400 INJECTION INTRAVENOUS at 20:03

## 2024-01-01 RX ADMIN — HYDROMORPHONE HYDROCHLORIDE 0.2 MG: 1 INJECTION, SOLUTION INTRAMUSCULAR; INTRAVENOUS; SUBCUTANEOUS at 22:16

## 2024-01-01 RX ADMIN — NYSTATIN 500000 UNITS: 100000 SUSPENSION ORAL at 18:33

## 2024-01-01 RX ADMIN — OXYCODONE HYDROCHLORIDE 5 MG: 5 TABLET ORAL at 08:16

## 2024-01-01 RX ADMIN — Medication 1 TABLET: at 08:02

## 2024-01-01 RX ADMIN — ETOMIDATE 20 MG: 20 INJECTION, SOLUTION INTRAVENOUS at 16:12

## 2024-01-01 RX ADMIN — CALCIUM GLUCONATE 1 G: 20 INJECTION, SOLUTION INTRAVENOUS at 11:14

## 2024-01-01 RX ADMIN — FERROUS SULFATE TAB 325 MG (65 MG ELEMENTAL FE) 1 TABLET: 325 (65 FE) TAB at 08:19

## 2024-01-01 RX ADMIN — NYSTATIN 500000 UNITS: 100000 SUSPENSION ORAL at 09:08

## 2024-01-01 RX ADMIN — ASPIRIN 81 MG CHEWABLE TABLET 81 MG: 81 TABLET CHEWABLE at 08:14

## 2024-01-01 RX ADMIN — Medication 1 TABLET: at 10:00

## 2024-01-01 RX ADMIN — ACETAMINOPHEN 650 MG: 325 TABLET ORAL at 14:38

## 2024-01-01 RX ADMIN — CALCITRIOL 0.5 MCG: 1 SOLUTION ORAL at 09:45

## 2024-01-01 RX ADMIN — Medication 1 TABLET: at 09:16

## 2024-01-01 RX ADMIN — PREDNISONE 10 MG: 10 TABLET ORAL at 07:40

## 2024-01-01 RX ADMIN — FENTANYL CITRATE 350 MCG: 50 INJECTION, SOLUTION INTRAMUSCULAR; INTRAVENOUS at 19:10

## 2024-01-01 RX ADMIN — NYSTATIN 500000 UNITS: 100000 SUSPENSION ORAL at 12:20

## 2024-01-01 RX ADMIN — OXYCODONE HYDROCHLORIDE 5 MG: 5 TABLET ORAL at 16:48

## 2024-01-01 RX ADMIN — SIROLIMUS 1.5 MG: 1 SOLUTION ORAL at 09:09

## 2024-01-01 RX ADMIN — HYDROMORPHONE HYDROCHLORIDE 0.2 MG: 1 INJECTION, SOLUTION INTRAMUSCULAR; INTRAVENOUS; SUBCUTANEOUS at 11:57

## 2024-01-01 RX ADMIN — Medication 1 TABLET: at 08:24

## 2024-01-01 RX ADMIN — PANTOPRAZOLE SODIUM 40 MG: 40 INJECTION, POWDER, FOR SOLUTION INTRAVENOUS at 09:24

## 2024-01-01 RX ADMIN — ACETAMINOPHEN 650 MG: 325 TABLET ORAL at 20:07

## 2024-01-01 RX ADMIN — PANTOPRAZOLE SODIUM 40 MG: 40 INJECTION, POWDER, FOR SOLUTION INTRAVENOUS at 09:05

## 2024-01-01 RX ADMIN — DEXMEDETOMIDINE HYDROCHLORIDE 0.3 MCG/KG/HR: 400 INJECTION INTRAVENOUS at 18:27

## 2024-01-01 RX ADMIN — FOLIC ACID 1 MG: 1 TABLET ORAL at 08:39

## 2024-01-01 RX ADMIN — PANTOPRAZOLE SODIUM 40 MG: 40 INJECTION, POWDER, FOR SOLUTION INTRAVENOUS at 09:20

## 2024-01-01 RX ADMIN — DIPHENHYDRAMINE HYDROCHLORIDE 25 MG: 25 CAPSULE ORAL at 14:00

## 2024-01-01 RX ADMIN — ONDANSETRON 4 MG: 2 INJECTION INTRAMUSCULAR; INTRAVENOUS at 20:23

## 2024-01-01 RX ADMIN — PANTOPRAZOLE SODIUM 40 MG: 40 TABLET, DELAYED RELEASE ORAL at 08:30

## 2024-01-01 RX ADMIN — ISOSORBIDE DINITRATE 20 MG: 20 TABLET ORAL at 00:25

## 2024-01-01 RX ADMIN — CEPHALEXIN 500 MG: 250 FOR SUSPENSION ORAL at 12:03

## 2024-01-01 RX ADMIN — Medication 1 TABLET: at 09:17

## 2024-01-01 RX ADMIN — PROPOFOL 35 MCG/KG/MIN: 10 INJECTION, EMULSION INTRAVENOUS at 20:24

## 2024-01-01 RX ADMIN — ACETAMINOPHEN 975 MG: 325 TABLET ORAL at 09:16

## 2024-01-01 RX ADMIN — VANCOMYCIN HYDROCHLORIDE 750 MG: 750 INJECTION, SOLUTION INTRAVENOUS at 08:12

## 2024-01-01 RX ADMIN — SODIUM CHLORIDE, POTASSIUM CHLORIDE, SODIUM LACTATE AND CALCIUM CHLORIDE 10 ML/HR: 600; 310; 30; 20 INJECTION, SOLUTION INTRAVENOUS at 21:56

## 2024-01-01 RX ADMIN — ACETAMINOPHEN 650 MG: 325 TABLET ORAL at 16:35

## 2024-01-01 RX ADMIN — DIPHENHYDRAMINE HYDROCHLORIDE 25 MG: 25 CAPSULE ORAL at 20:09

## 2024-01-01 RX ADMIN — MINERAL SUPPLEMENT IRON 300 MG / 5 ML STRENGTH LIQUID 100 PER BOX UNFLAVORED 60 MG OF IRON: at 08:21

## 2024-01-01 RX ADMIN — TACROLIMUS 1 MG: 1 CAPSULE ORAL at 18:18

## 2024-01-01 RX ADMIN — SIROLIMUS 2.5 MG: 1 TABLET ORAL at 06:31

## 2024-01-01 RX ADMIN — ASPIRIN 81 MG CHEWABLE TABLET 81 MG: 81 TABLET CHEWABLE at 08:24

## 2024-01-01 RX ADMIN — PIPERACILLIN SODIUM AND TAZOBACTAM SODIUM 3.38 G: 3; .375 INJECTION, SOLUTION INTRAVENOUS at 20:29

## 2024-01-01 RX ADMIN — HYDROMORPHONE HYDROCHLORIDE 0.2 MG: 1 INJECTION, SOLUTION INTRAMUSCULAR; INTRAVENOUS; SUBCUTANEOUS at 21:15

## 2024-01-01 RX ADMIN — Medication 3 MG: at 05:50

## 2024-01-01 RX ADMIN — HEPARIN SODIUM 5000 UNITS: 5000 INJECTION INTRAVENOUS; SUBCUTANEOUS at 10:17

## 2024-01-01 RX ADMIN — INSULIN HUMAN 20 UNITS: 100 INJECTION, SUSPENSION SUBCUTANEOUS at 01:17

## 2024-01-01 RX ADMIN — NYSTATIN 500000 UNITS: 100000 SUSPENSION ORAL at 06:17

## 2024-01-01 RX ADMIN — INSULIN LISPRO 2 UNITS: 100 INJECTION, SOLUTION INTRAVENOUS; SUBCUTANEOUS at 20:35

## 2024-01-01 RX ADMIN — LORAZEPAM 0.5 MG: 2 INJECTION INTRAMUSCULAR; INTRAVENOUS at 11:34

## 2024-01-01 RX ADMIN — NYSTATIN 500000 UNITS: 100000 SUSPENSION ORAL at 05:50

## 2024-01-01 RX ADMIN — Medication 0.05 MCG/KG/MIN: at 22:15

## 2024-01-01 RX ADMIN — CALCIUM CHLORIDE, MAGNESIUM CHLORIDE, DEXTROSE MONOHYDRATE, LACTIC ACID, SODIUM CHLORIDE, SODIUM BICARBONATE AND POTASSIUM CHLORIDE 2400 ML/HR: 3.68; 3.05; 22; 5.4; 6.46; 3.09; .314 INJECTION INTRAVENOUS at 10:12

## 2024-01-01 RX ADMIN — Medication 2 L/MIN: at 07:59

## 2024-01-01 RX ADMIN — CALCIUM GLUCONATE 5914 MG: 20 INJECTION, SOLUTION INTRAVENOUS at 10:46

## 2024-01-01 RX ADMIN — SULFAMETHOXAZOLE AND TRIMETHOPRIM 80 MG OF TRIMETHOPRIM: 200; 40 SUSPENSION ORAL at 08:00

## 2024-01-01 RX ADMIN — METHOCARBAMOL 500 MG: 500 TABLET ORAL at 22:29

## 2024-01-01 RX ADMIN — DIBASIC SODIUM PHOSPHATE, MONOBASIC POTASSIUM PHOSPHATE AND MONOBASIC SODIUM PHOSPHATE 500 MG: 852; 155; 130 TABLET ORAL at 16:14

## 2024-01-01 RX ADMIN — HYDROMORPHONE HYDROCHLORIDE 0.4 MG: 1 INJECTION, SOLUTION INTRAMUSCULAR; INTRAVENOUS; SUBCUTANEOUS at 20:14

## 2024-01-01 RX ADMIN — INSULIN HUMAN 6 UNITS: 100 INJECTION, SOLUTION PARENTERAL at 12:14

## 2024-01-01 RX ADMIN — ASPIRIN 81 MG: 81 TABLET, COATED ORAL at 08:04

## 2024-01-01 RX ADMIN — NYSTATIN 500000 UNITS: 100000 SUSPENSION ORAL at 23:42

## 2024-01-01 RX ADMIN — CEPHALEXIN 500 MG: 500 CAPSULE ORAL at 14:50

## 2024-01-01 RX ADMIN — ALBUMIN HUMAN 25 G: 0.25 SOLUTION INTRAVENOUS at 15:15

## 2024-01-01 RX ADMIN — PIPERACILLIN SODIUM AND TAZOBACTAM SODIUM 3.38 G: 3; .375 INJECTION, SOLUTION INTRAVENOUS at 15:13

## 2024-01-01 RX ADMIN — AMLODIPINE BESYLATE 2.5 MG: 5 TABLET ORAL at 08:04

## 2024-01-01 RX ADMIN — HYDROMORPHONE HYDROCHLORIDE 0.4 MG: 1 INJECTION, SOLUTION INTRAMUSCULAR; INTRAVENOUS; SUBCUTANEOUS at 00:02

## 2024-01-01 RX ADMIN — NYSTATIN 500000 UNITS: 100000 SUSPENSION ORAL at 06:09

## 2024-01-01 RX ADMIN — ONDANSETRON 4 MG: 2 INJECTION INTRAMUSCULAR; INTRAVENOUS at 04:52

## 2024-01-01 RX ADMIN — HYDRALAZINE HYDROCHLORIDE 10 MG: 10 TABLET ORAL at 21:02

## 2024-01-01 RX ADMIN — ACETAMINOPHEN 650 MG: 325 TABLET ORAL at 07:30

## 2024-01-01 RX ADMIN — PANTOPRAZOLE SODIUM 40 MG: 40 INJECTION, POWDER, FOR SOLUTION INTRAVENOUS at 08:03

## 2024-01-01 RX ADMIN — LIDOCAINE 1 PATCH: 4 PATCH TOPICAL at 08:08

## 2024-01-01 RX ADMIN — HEPARIN SODIUM 10 ML/HR: 1000 INJECTION INTRAVENOUS; SUBCUTANEOUS at 04:22

## 2024-01-01 RX ADMIN — ACETAMINOPHEN 650 MG: 650 SOLUTION ORAL at 09:49

## 2024-01-01 RX ADMIN — TACROLIMUS 1 MG: 1 CAPSULE ORAL at 06:33

## 2024-01-01 RX ADMIN — FENTANYL CITRATE 150 MCG: 50 INJECTION, SOLUTION INTRAMUSCULAR; INTRAVENOUS at 18:49

## 2024-01-01 RX ADMIN — ONDANSETRON 4 MG: 2 INJECTION INTRAMUSCULAR; INTRAVENOUS at 05:06

## 2024-01-01 RX ADMIN — CALCIUM CHLORIDE, MAGNESIUM CHLORIDE, DEXTROSE MONOHYDRATE, LACTIC ACID, SODIUM CHLORIDE, SODIUM BICARBONATE AND POTASSIUM CHLORIDE 25 ML/KG/HR: 3.68; 3.05; 22; 5.4; 6.46; 3.09; .314 INJECTION INTRAVENOUS at 10:30

## 2024-01-01 RX ADMIN — HYDROXYZINE HYDROCHLORIDE 25 MG: 25 TABLET, FILM COATED ORAL at 20:07

## 2024-01-01 RX ADMIN — MILRINONE LACTATE 0.25 MCG/KG/MIN: 200 INJECTION, SOLUTION INTRAVENOUS at 09:35

## 2024-01-01 RX ADMIN — ACETAMINOPHEN 650 MG: 325 TABLET ORAL at 07:41

## 2024-01-01 RX ADMIN — ALBUMIN HUMAN 12.5 G: 0.05 INJECTION, SOLUTION INTRAVENOUS at 04:00

## 2024-01-01 RX ADMIN — TRAZODONE HYDROCHLORIDE 25 MG: 50 TABLET ORAL at 20:32

## 2024-01-01 RX ADMIN — PSYLLIUM HUSK 1 PACKET: 3.4 POWDER ORAL at 09:35

## 2024-01-01 RX ADMIN — LEVOTHYROXINE SODIUM 200 MCG: 0.2 TABLET ORAL at 06:21

## 2024-01-01 RX ADMIN — NYSTATIN 500000 UNITS: 100000 SUSPENSION ORAL at 12:04

## 2024-01-01 RX ADMIN — CYANOCOBALAMIN TAB 1000 MCG 500 MCG: 1000 TAB at 09:08

## 2024-01-01 RX ADMIN — FUROSEMIDE 40 MG: 10 INJECTION, SOLUTION INTRAMUSCULAR; INTRAVENOUS at 15:05

## 2024-01-01 RX ADMIN — HYDRALAZINE HYDROCHLORIDE 50 MG: 50 TABLET ORAL at 00:32

## 2024-01-01 RX ADMIN — Medication 10 MG: at 21:04

## 2024-01-01 RX ADMIN — SODIUM BICARBONATE 10 ML/HR: 84 INJECTION, SOLUTION INTRAVENOUS at 12:30

## 2024-01-01 RX ADMIN — Medication 10 MG: at 17:39

## 2024-01-01 RX ADMIN — TACROLIMUS 4 MG: 1 CAPSULE ORAL at 06:12

## 2024-01-01 RX ADMIN — CYANOCOBALAMIN TAB 1000 MCG 500 MCG: 1000 TAB at 09:19

## 2024-01-01 RX ADMIN — FERROUS SULFATE TAB 325 MG (65 MG ELEMENTAL FE) 1 TABLET: 325 (65 FE) TAB at 09:08

## 2024-01-01 RX ADMIN — NYSTATIN 500000 UNITS: 100000 SUSPENSION ORAL at 12:36

## 2024-01-01 RX ADMIN — ACETAMINOPHEN 650 MG: 325 TABLET ORAL at 12:10

## 2024-01-01 RX ADMIN — HEPARIN SODIUM 5000 UNITS: 5000 INJECTION INTRAVENOUS; SUBCUTANEOUS at 10:02

## 2024-01-01 RX ADMIN — HYDRALAZINE HYDROCHLORIDE 100 MG: 100 TABLET, FILM COATED ORAL at 00:00

## 2024-01-01 RX ADMIN — SENNOSIDES AND DOCUSATE SODIUM 2 TABLET: 8.6; 5 TABLET ORAL at 08:42

## 2024-01-01 RX ADMIN — Medication 2 G: at 08:20

## 2024-01-01 RX ADMIN — Medication 15 ML: at 09:39

## 2024-01-01 RX ADMIN — INSULIN HUMAN 10 UNITS/HR: 1 INJECTION, SOLUTION INTRAVENOUS at 21:00

## 2024-01-01 RX ADMIN — SODIUM BICARBONATE 10 ML/HR: 84 INJECTION, SOLUTION INTRAVENOUS at 06:34

## 2024-01-01 RX ADMIN — PANTOPRAZOLE SODIUM 40 MG: 40 INJECTION, POWDER, FOR SOLUTION INTRAVENOUS at 09:22

## 2024-01-01 RX ADMIN — OXYMETAZOLINE HYDROCHLORIDE 2 SPRAY: 0.05 SPRAY NASAL at 09:52

## 2024-01-01 RX ADMIN — DOBUTAMINE HYDROCHLORIDE 2.5 MCG/KG/MIN: 400 INJECTION INTRAVENOUS at 18:50

## 2024-01-01 RX ADMIN — PERFLUTREN 10 ML OF DILUTION: 6.52 INJECTION, SUSPENSION INTRAVENOUS at 14:48

## 2024-01-01 RX ADMIN — OXYCODONE HYDROCHLORIDE 5 MG: 5 TABLET ORAL at 10:06

## 2024-01-01 RX ADMIN — PSYLLIUM HUSK 1 PACKET: 3.4 POWDER ORAL at 09:00

## 2024-01-01 RX ADMIN — MIDAZOLAM HYDROCHLORIDE 2 MG: 1 INJECTION, SOLUTION INTRAMUSCULAR; INTRAVENOUS at 22:22

## 2024-01-01 RX ADMIN — Medication 3 MG: at 18:30

## 2024-01-01 RX ADMIN — LEVOTHYROXINE SODIUM 200 MCG: 0.2 TABLET ORAL at 04:01

## 2024-01-01 RX ADMIN — PROPOFOL 34.97 MCG/KG/MIN: 10 INJECTION, EMULSION INTRAVENOUS at 12:08

## 2024-01-01 RX ADMIN — HEPARIN SODIUM 5000 UNITS: 5000 INJECTION INTRAVENOUS; SUBCUTANEOUS at 10:32

## 2024-01-01 RX ADMIN — NYSTATIN 500000 UNITS: 100000 SUSPENSION ORAL at 18:17

## 2024-01-01 RX ADMIN — Medication 2 G: at 23:31

## 2024-01-01 RX ADMIN — LACTULOSE 20 G: 20 SOLUTION ORAL at 09:48

## 2024-01-01 RX ADMIN — NYSTATIN 400000 UNITS: 100000 SUSPENSION ORAL at 06:15

## 2024-01-01 RX ADMIN — HYDRALAZINE HYDROCHLORIDE 100 MG: 100 TABLET, FILM COATED ORAL at 15:44

## 2024-01-01 RX ADMIN — HYDROMORPHONE HYDROCHLORIDE 0.2 MG: 1 INJECTION, SOLUTION INTRAMUSCULAR; INTRAVENOUS; SUBCUTANEOUS at 20:55

## 2024-01-01 RX ADMIN — MINERAL SUPPLEMENT IRON 300 MG / 5 ML STRENGTH LIQUID 100 PER BOX UNFLAVORED 60 MG OF IRON: at 09:37

## 2024-01-01 RX ADMIN — ROSUVASTATIN CALCIUM 40 MG: 40 TABLET, FILM COATED ORAL at 21:39

## 2024-01-01 RX ADMIN — ACETAMINOPHEN 975 MG: 325 TABLET ORAL at 19:54

## 2024-01-01 RX ADMIN — SULFAMETHOXAZOLE AND TRIMETHOPRIM 80 MG: 400; 80 TABLET ORAL at 15:56

## 2024-01-01 RX ADMIN — LEVOTHYROXINE SODIUM 250 MCG: 0.2 TABLET ORAL at 06:31

## 2024-01-01 RX ADMIN — HYDROXYZINE HYDROCHLORIDE 25 MG: 25 TABLET, FILM COATED ORAL at 20:03

## 2024-01-01 RX ADMIN — PIPERACILLIN SODIUM AND TAZOBACTAM SODIUM 3.38 G: 3; .375 INJECTION, SOLUTION INTRAVENOUS at 23:48

## 2024-01-01 RX ADMIN — POTASSIUM & SODIUM PHOSPHATES POWDER PACK 280-160-250 MG 1 PACKET: 280-160-250 PACK at 00:55

## 2024-01-01 RX ADMIN — HYDRALAZINE HYDROCHLORIDE 5 MG: 20 INJECTION INTRAMUSCULAR; INTRAVENOUS at 09:03

## 2024-01-01 RX ADMIN — CALCIUM GLUCONATE 2 G: 20 INJECTION, SOLUTION INTRAVENOUS at 08:45

## 2024-01-01 RX ADMIN — DEXMEDETOMIDINE HYDROCHLORIDE 0.5 MCG/KG/HR: 400 INJECTION INTRAVENOUS at 21:00

## 2024-01-01 RX ADMIN — SENNOSIDES AND DOCUSATE SODIUM 1 TABLET: 8.6; 5 TABLET ORAL at 08:18

## 2024-01-01 RX ADMIN — HEPARIN SODIUM 5000 UNITS: 5000 INJECTION INTRAVENOUS; SUBCUTANEOUS at 08:03

## 2024-01-01 RX ADMIN — LIDOCAINE 1 PATCH: 4 PATCH TOPICAL at 19:53

## 2024-01-01 RX ADMIN — INSULIN LISPRO 2 UNITS: 100 INJECTION, SOLUTION INTRAVENOUS; SUBCUTANEOUS at 08:25

## 2024-01-01 RX ADMIN — FOLIC ACID 1 MG: 1 TABLET ORAL at 08:45

## 2024-01-01 RX ADMIN — ACETAMINOPHEN 650 MG: 650 SOLUTION ORAL at 02:11

## 2024-01-01 RX ADMIN — LEVOTHYROXINE SODIUM 200 MCG: 0.2 TABLET ORAL at 08:22

## 2024-01-01 RX ADMIN — ACETAMINOPHEN 650 MG: 325 TABLET ORAL at 08:00

## 2024-01-01 RX ADMIN — DEXMEDETOMIDINE HYDROCHLORIDE 0.2 MCG/KG/HR: 400 INJECTION INTRAVENOUS at 04:30

## 2024-01-01 RX ADMIN — MULTIVIT AND MINERALS-FERROUS GLUCONATE 9 MG IRON/15 ML ORAL LIQUID 15 ML: at 09:17

## 2024-01-01 RX ADMIN — TACROLIMUS 3 MG: 1 CAPSULE ORAL at 06:42

## 2024-01-01 RX ADMIN — ROSUVASTATIN 40 MG: 40 TABLET, FILM COATED ORAL at 20:49

## 2024-01-01 RX ADMIN — ONDANSETRON 4 MG: 2 INJECTION INTRAMUSCULAR; INTRAVENOUS at 07:36

## 2024-01-01 RX ADMIN — CALCITRIOL 0.5 MCG: 1 SOLUTION ORAL at 08:08

## 2024-01-01 RX ADMIN — ALBUMIN HUMAN 25 G: 0.25 SOLUTION INTRAVENOUS at 08:27

## 2024-01-01 RX ADMIN — TACROLIMUS 2 MG: 1 CAPSULE ORAL at 06:34

## 2024-01-01 RX ADMIN — NYSTATIN 500000 UNITS: 100000 SUSPENSION ORAL at 18:04

## 2024-01-01 RX ADMIN — SALINE NASAL SPRAY 5 SPRAY: 1.5 SOLUTION NASAL at 16:35

## 2024-01-01 RX ADMIN — FERROUS SULFATE TAB 325 MG (65 MG ELEMENTAL FE) 1 TABLET: 325 (65 FE) TAB at 08:44

## 2024-01-01 RX ADMIN — ACETAMINOPHEN 650 MG: 325 TABLET ORAL at 14:09

## 2024-01-01 RX ADMIN — SENNOSIDES AND DOCUSATE SODIUM 2 TABLET: 8.6; 5 TABLET ORAL at 08:43

## 2024-01-01 RX ADMIN — DIBASIC SODIUM PHOSPHATE, MONOBASIC POTASSIUM PHOSPHATE AND MONOBASIC SODIUM PHOSPHATE 500 MG: 852; 155; 130 TABLET ORAL at 20:06

## 2024-01-01 RX ADMIN — CALCIUM GLUCONATE 2 G: 20 INJECTION, SOLUTION INTRAVENOUS at 19:40

## 2024-01-01 RX ADMIN — MIDAZOLAM HYDROCHLORIDE 1 MG: 1 INJECTION INTRAMUSCULAR; INTRAVENOUS at 18:27

## 2024-01-01 RX ADMIN — TACROLIMUS 2.5 MG: 1 CAPSULE ORAL at 06:15

## 2024-01-01 RX ADMIN — TACROLIMUS 1 MG: 1 CAPSULE ORAL at 06:14

## 2024-01-01 RX ADMIN — ROCURONIUM 30 MG: 50 INJECTION, SOLUTION INTRAVENOUS at 18:26

## 2024-01-01 RX ADMIN — INSULIN HUMAN 2 UNITS: 100 INJECTION, SOLUTION PARENTERAL at 12:37

## 2024-01-01 RX ADMIN — INSULIN HUMAN 3 UNITS: 100 INJECTION, SOLUTION PARENTERAL at 02:14

## 2024-01-01 RX ADMIN — ASPIRIN 81 MG CHEWABLE TABLET 81 MG: 81 TABLET CHEWABLE at 08:44

## 2024-01-01 RX ADMIN — NOREPINEPHRINE BITARTRATE 0.01 MCG/KG/MIN: 8 INJECTION, SOLUTION INTRAVENOUS at 16:50

## 2024-01-01 RX ADMIN — HYDRALAZINE HYDROCHLORIDE 10 MG: 20 INJECTION INTRAMUSCULAR; INTRAVENOUS at 03:32

## 2024-01-01 RX ADMIN — ROSUVASTATIN CALCIUM 40 MG: 40 TABLET, FILM COATED ORAL at 20:22

## 2024-01-01 RX ADMIN — PANTOPRAZOLE SODIUM 40 MG: 40 TABLET, DELAYED RELEASE ORAL at 06:00

## 2024-01-01 RX ADMIN — POTASSIUM CHLORIDE 40 MEQ: 29.8 INJECTION, SOLUTION INTRAVENOUS at 04:09

## 2024-01-01 RX ADMIN — Medication 1 TABLET: at 08:38

## 2024-01-01 RX ADMIN — EPINEPHRINE 0.02 MCG/KG/MIN: 1 INJECTION INTRAMUSCULAR; INTRAVENOUS; SUBCUTANEOUS at 23:11

## 2024-01-01 RX ADMIN — ONDANSETRON 4 MG: 2 INJECTION INTRAMUSCULAR; INTRAVENOUS at 17:15

## 2024-01-01 RX ADMIN — ETOMIDATE INJECTION 20 MG: 2 SOLUTION INTRAVENOUS at 16:17

## 2024-01-01 RX ADMIN — ROSUVASTATIN 40 MG: 40 TABLET, FILM COATED ORAL at 21:09

## 2024-01-01 RX ADMIN — SUCCINYLCHOLINE CHLORIDE 180 MG: 20 INJECTION, SOLUTION INTRAMUSCULAR; INTRAVENOUS at 02:37

## 2024-01-01 RX ADMIN — ONDANSETRON 4 MG: 2 INJECTION INTRAMUSCULAR; INTRAVENOUS at 03:55

## 2024-01-01 RX ADMIN — HYDROXYZINE HYDROCHLORIDE 25 MG: 25 TABLET, FILM COATED ORAL at 09:04

## 2024-01-01 RX ADMIN — LEVOTHYROXINE SODIUM 250 MCG: 0.12 TABLET ORAL at 04:30

## 2024-01-01 RX ADMIN — HEPARIN SODIUM 5000 UNITS: 5000 INJECTION INTRAVENOUS; SUBCUTANEOUS at 08:30

## 2024-01-01 RX ADMIN — LEVOTHYROXINE SODIUM 250 MCG: 0.12 TABLET ORAL at 05:03

## 2024-01-01 RX ADMIN — FOLIC ACID 1 MG: 1 TABLET ORAL at 09:05

## 2024-01-01 RX ADMIN — HEPARIN SODIUM 5000 UNITS: 5000 INJECTION INTRAVENOUS; SUBCUTANEOUS at 16:21

## 2024-01-01 RX ADMIN — ONDANSETRON 4 MG: 2 INJECTION, SOLUTION INTRAMUSCULAR; INTRAVENOUS at 13:44

## 2024-01-01 RX ADMIN — FUROSEMIDE 80 MG: 10 INJECTION, SOLUTION INTRAMUSCULAR; INTRAVENOUS at 23:45

## 2024-01-01 RX ADMIN — TACROLIMUS 0.5 MG: 0.5 CAPSULE ORAL at 16:55

## 2024-01-01 RX ADMIN — ONDANSETRON 4 MG: 2 INJECTION INTRAMUSCULAR; INTRAVENOUS at 11:54

## 2024-01-01 RX ADMIN — HEPARIN SODIUM 5000 UNITS: 5000 INJECTION INTRAVENOUS; SUBCUTANEOUS at 00:05

## 2024-01-01 RX ADMIN — SODIUM BICARBONATE 10 ML/HR: 84 INJECTION, SOLUTION INTRAVENOUS at 03:50

## 2024-01-01 RX ADMIN — Medication 1 TABLET: at 08:46

## 2024-01-01 RX ADMIN — ACETAMINOPHEN 650 MG: 325 TABLET ORAL at 02:16

## 2024-01-01 RX ADMIN — FOLIC ACID 1 MG: 1 TABLET ORAL at 09:19

## 2024-01-01 RX ADMIN — HYDRALAZINE HYDROCHLORIDE 20 MG: 20 INJECTION INTRAMUSCULAR; INTRAVENOUS at 13:59

## 2024-01-01 RX ADMIN — HYDRALAZINE HYDROCHLORIDE 25 MG: 25 TABLET ORAL at 14:51

## 2024-01-01 RX ADMIN — HYDROMORPHONE HYDROCHLORIDE 1 MG: 2 TABLET ORAL at 16:26

## 2024-01-01 RX ADMIN — TACROLIMUS 1.5 MG: 1 CAPSULE ORAL at 06:31

## 2024-01-01 RX ADMIN — TACROLIMUS 1 MG: 1 CAPSULE ORAL at 18:51

## 2024-01-01 RX ADMIN — PREDNISONE 10 MG: 10 TABLET ORAL at 08:46

## 2024-01-01 RX ADMIN — Medication 40 PERCENT: at 07:39

## 2024-01-01 RX ADMIN — NYSTATIN 500000 UNITS: 100000 SUSPENSION ORAL at 01:26

## 2024-01-01 RX ADMIN — CALCIUM GLUCONATE 2 G: 20 INJECTION, SOLUTION INTRAVENOUS at 11:44

## 2024-01-01 RX ADMIN — VANCOMYCIN HYDROCHLORIDE 750 MG: 750 INJECTION, SOLUTION INTRAVENOUS at 09:29

## 2024-01-01 RX ADMIN — OXYCODONE HYDROCHLORIDE 2.5 MG: 5 TABLET ORAL at 22:32

## 2024-01-01 RX ADMIN — TACROLIMUS 1.5 MG: 1 CAPSULE ORAL at 06:00

## 2024-01-01 RX ADMIN — SIROLIMUS 2.5 MG: 1 TABLET ORAL at 06:37

## 2024-01-01 RX ADMIN — ACETAMINOPHEN 975 MG: 325 TABLET ORAL at 21:13

## 2024-01-01 RX ADMIN — OXYCODONE HYDROCHLORIDE 5 MG: 5 TABLET ORAL at 09:58

## 2024-01-01 RX ADMIN — TACROLIMUS 3 MG: 1 CAPSULE ORAL at 18:04

## 2024-01-01 RX ADMIN — DIPHENHYDRAMINE HYDROCHLORIDE 25 MG: 25 CAPSULE ORAL at 11:31

## 2024-01-01 RX ADMIN — TACROLIMUS 2.5 MG: 1 CAPSULE ORAL at 18:29

## 2024-01-01 RX ADMIN — HEPARIN SODIUM 5000 UNITS: 5000 INJECTION INTRAVENOUS; SUBCUTANEOUS at 08:09

## 2024-01-01 RX ADMIN — Medication 2 G: at 08:57

## 2024-01-01 RX ADMIN — CALCITRIOL 0.5 MCG: 1 SOLUTION ORAL at 09:43

## 2024-01-01 RX ADMIN — DIBASIC SODIUM PHOSPHATE, MONOBASIC POTASSIUM PHOSPHATE AND MONOBASIC SODIUM PHOSPHATE 500 MG: 852; 155; 130 TABLET ORAL at 12:12

## 2024-01-01 RX ADMIN — MYCOPHENOLIC ACID 360 MG: 180 TABLET, DELAYED RELEASE ORAL at 06:15

## 2024-01-01 RX ADMIN — HYDROMORPHONE HYDROCHLORIDE 0.2 MG: 1 INJECTION, SOLUTION INTRAMUSCULAR; INTRAVENOUS; SUBCUTANEOUS at 11:58

## 2024-01-01 RX ADMIN — SODIUM CHLORIDE, SODIUM GLUCONATE, SODIUM ACETATE, POTASSIUM CHLORIDE AND MAGNESIUM CHLORIDE: 526; 502; 368; 37; 30 INJECTION, SOLUTION INTRAVENOUS at 11:28

## 2024-01-01 RX ADMIN — QUETIAPINE FUMARATE 25 MG: 25 TABLET ORAL at 22:35

## 2024-01-01 RX ADMIN — PROPOFOL 50 MCG/KG/MIN: 10 INJECTION, EMULSION INTRAVENOUS at 22:00

## 2024-01-01 RX ADMIN — NYSTATIN 500000 UNITS: 100000 SUSPENSION ORAL at 06:13

## 2024-01-01 RX ADMIN — HYDROMORPHONE HYDROCHLORIDE 0.2 MG: 1 INJECTION, SOLUTION INTRAMUSCULAR; INTRAVENOUS; SUBCUTANEOUS at 08:39

## 2024-01-01 RX ADMIN — NYSTATIN 500000 UNITS: 100000 SUSPENSION ORAL at 00:48

## 2024-01-01 RX ADMIN — ONDANSETRON 4 MG: 2 INJECTION INTRAMUSCULAR; INTRAVENOUS at 21:27

## 2024-01-01 RX ADMIN — CALCIUM GLUCONATE 2 G: 20 INJECTION, SOLUTION INTRAVENOUS at 04:55

## 2024-01-01 RX ADMIN — ONDANSETRON 4 MG: 2 INJECTION INTRAMUSCULAR; INTRAVENOUS at 13:15

## 2024-01-01 RX ADMIN — IMMUNE GLOBULIN INFUSION (HUMAN) 100 G: 100 INJECTION, SOLUTION INTRAVENOUS; SUBCUTANEOUS at 02:21

## 2024-01-01 RX ADMIN — DIBASIC SODIUM PHOSPHATE, MONOBASIC POTASSIUM PHOSPHATE AND MONOBASIC SODIUM PHOSPHATE 500 MG: 852; 155; 130 TABLET ORAL at 21:25

## 2024-01-01 RX ADMIN — INSULIN HUMAN 1 UNITS: 100 INJECTION, SOLUTION PARENTERAL at 13:18

## 2024-01-01 RX ADMIN — ALBUMIN HUMAN 25 G: 0.25 SOLUTION INTRAVENOUS at 15:30

## 2024-01-01 RX ADMIN — LEVOTHYROXINE SODIUM 200 MCG: 0.2 TABLET ORAL at 04:31

## 2024-01-01 RX ADMIN — ONDANSETRON 4 MG: 2 INJECTION INTRAMUSCULAR; INTRAVENOUS at 20:55

## 2024-01-01 RX ADMIN — VANCOMYCIN HYDROCHLORIDE 1000 MG: 1 INJECTION, SOLUTION INTRAVENOUS at 16:09

## 2024-01-01 RX ADMIN — THIAMINE HYDROCHLORIDE 500 MG: 100 INJECTION, SOLUTION INTRAMUSCULAR; INTRAVENOUS at 15:42

## 2024-01-01 RX ADMIN — OXYCODONE HYDROCHLORIDE 5 MG: 5 TABLET ORAL at 03:37

## 2024-01-01 RX ADMIN — SENNOSIDES AND DOCUSATE SODIUM 1 TABLET: 8.6; 5 TABLET ORAL at 08:39

## 2024-01-01 RX ADMIN — INSULIN HUMAN 2 UNITS: 100 INJECTION, SOLUTION PARENTERAL at 02:12

## 2024-01-01 RX ADMIN — IPRATROPIUM BROMIDE AND ALBUTEROL SULFATE 3 ML: .5; 3 SOLUTION RESPIRATORY (INHALATION) at 08:35

## 2024-01-01 RX ADMIN — HYDROMORPHONE HYDROCHLORIDE 0.2 MG: 1 INJECTION, SOLUTION INTRAMUSCULAR; INTRAVENOUS; SUBCUTANEOUS at 09:48

## 2024-01-01 RX ADMIN — LEVOTHYROXINE SODIUM ANHYDROUS 90 MCG: 100 INJECTION, POWDER, LYOPHILIZED, FOR SOLUTION INTRAVENOUS at 08:15

## 2024-01-01 RX ADMIN — INSULIN LISPRO 2 UNITS: 100 INJECTION, SOLUTION INTRAVENOUS; SUBCUTANEOUS at 12:11

## 2024-01-01 RX ADMIN — CALCIUM 1250 MG: 500 TABLET ORAL at 08:48

## 2024-01-01 RX ADMIN — ACETAMINOPHEN 650 MG: 325 TABLET ORAL at 13:33

## 2024-01-01 RX ADMIN — HYDROMORPHONE HYDROCHLORIDE 0.2 MG: 1 INJECTION, SOLUTION INTRAMUSCULAR; INTRAVENOUS; SUBCUTANEOUS at 19:14

## 2024-01-01 RX ADMIN — TACROLIMUS 3.5 MG: 1 CAPSULE ORAL at 06:26

## 2024-01-01 RX ADMIN — MYCOPHENOLIC ACID 180 MG: 180 TABLET, DELAYED RELEASE ORAL at 18:22

## 2024-01-01 RX ADMIN — PSYLLIUM HUSK 1 PACKET: 3.4 POWDER ORAL at 09:05

## 2024-01-01 RX ADMIN — ALBUMIN HUMAN 25 G: 0.25 SOLUTION INTRAVENOUS at 14:45

## 2024-01-01 RX ADMIN — ONDANSETRON 4 MG: 2 INJECTION INTRAMUSCULAR; INTRAVENOUS at 01:09

## 2024-01-01 RX ADMIN — TACROLIMUS 1.5 MG: 1 CAPSULE ORAL at 06:12

## 2024-01-01 RX ADMIN — CYANOCOBALAMIN TAB 1000 MCG 500 MCG: 1000 TAB at 08:03

## 2024-01-01 RX ADMIN — INSULIN LISPRO 2 UNITS: 100 INJECTION, SOLUTION INTRAVENOUS; SUBCUTANEOUS at 18:36

## 2024-01-01 RX ADMIN — HEPARIN SODIUM 5000 UNITS: 5000 INJECTION INTRAVENOUS; SUBCUTANEOUS at 08:42

## 2024-01-01 RX ADMIN — ASPIRIN 81 MG: 81 TABLET, COATED ORAL at 08:19

## 2024-01-01 RX ADMIN — MILRINONE LACTATE 0.25 MCG/KG/MIN: 200 INJECTION, SOLUTION INTRAVENOUS at 01:51

## 2024-01-01 RX ADMIN — HYDROMORPHONE HYDROCHLORIDE 0.2 MG: 1 INJECTION, SOLUTION INTRAMUSCULAR; INTRAVENOUS; SUBCUTANEOUS at 14:09

## 2024-01-01 RX ADMIN — HEPARIN SODIUM 10 ML/HR: 1000 INJECTION INTRAVENOUS; SUBCUTANEOUS at 19:18

## 2024-01-01 RX ADMIN — OXYCODONE HYDROCHLORIDE 5 MG: 5 TABLET ORAL at 15:22

## 2024-01-01 RX ADMIN — Medication 1 TABLET: at 08:41

## 2024-01-01 RX ADMIN — OXYCODONE HYDROCHLORIDE 5 MG: 5 TABLET ORAL at 18:17

## 2024-01-01 RX ADMIN — INSULIN LISPRO 2 UNITS: 100 INJECTION, SOLUTION INTRAVENOUS; SUBCUTANEOUS at 15:45

## 2024-01-01 RX ADMIN — ISOSORBIDE DINITRATE 20 MG: 10 TABLET ORAL at 01:09

## 2024-01-01 RX ADMIN — LIDOCAINE 1 PATCH: 4 PATCH TOPICAL at 08:42

## 2024-01-01 RX ADMIN — ACETAMINOPHEN 650 MG: 325 TABLET ORAL at 08:03

## 2024-01-01 RX ADMIN — MILRINONE LACTATE 0.25 MCG/KG/MIN: 200 INJECTION, SOLUTION INTRAVENOUS at 02:36

## 2024-01-01 RX ADMIN — HYDROMORPHONE HYDROCHLORIDE 0.2 MG: 1 INJECTION, SOLUTION INTRAMUSCULAR; INTRAVENOUS; SUBCUTANEOUS at 22:37

## 2024-01-01 RX ADMIN — HEPARIN SODIUM 1200 UNITS/HR: 10000 INJECTION, SOLUTION INTRAVENOUS at 16:21

## 2024-01-01 RX ADMIN — HEPARIN SODIUM 1200 UNITS/HR: 10000 INJECTION, SOLUTION INTRAVENOUS at 15:16

## 2024-01-01 RX ADMIN — HEPARIN SODIUM 5000 UNITS: 5000 INJECTION INTRAVENOUS; SUBCUTANEOUS at 08:39

## 2024-01-01 RX ADMIN — CALCIUM CHLORIDE, MAGNESIUM CHLORIDE, DEXTROSE MONOHYDRATE, LACTIC ACID, SODIUM CHLORIDE, SODIUM BICARBONATE AND POTASSIUM CHLORIDE 2400 ML/HR: 3.68; 3.05; 22; 5.4; 6.46; 3.09; .314 INJECTION INTRAVENOUS at 16:53

## 2024-01-01 RX ADMIN — CEFAZOLIN 2 G: 1 INJECTION, POWDER, FOR SOLUTION INTRAMUSCULAR; INTRAVENOUS at 16:40

## 2024-01-01 RX ADMIN — ONDANSETRON 4 MG: 2 INJECTION INTRAMUSCULAR; INTRAVENOUS at 15:27

## 2024-01-01 RX ADMIN — SENNOSIDES AND DOCUSATE SODIUM 1 TABLET: 8.6; 5 TABLET ORAL at 09:20

## 2024-01-01 RX ADMIN — OXYCODONE HYDROCHLORIDE 5 MG: 5 TABLET ORAL at 09:30

## 2024-01-01 RX ADMIN — HYDROMORPHONE HYDROCHLORIDE 0.4 MG: 1 INJECTION, SOLUTION INTRAMUSCULAR; INTRAVENOUS; SUBCUTANEOUS at 14:17

## 2024-01-01 RX ADMIN — SODIUM BICARBONATE 10 ML/HR: 84 INJECTION, SOLUTION INTRAVENOUS at 15:10

## 2024-01-01 RX ADMIN — CYANOCOBALAMIN TAB 1000 MCG 500 MCG: 1000 TAB at 09:05

## 2024-01-01 RX ADMIN — PANTOPRAZOLE SODIUM 40 MG: 40 TABLET, DELAYED RELEASE ORAL at 06:29

## 2024-01-01 RX ADMIN — MINERAL SUPPLEMENT IRON 300 MG / 5 ML STRENGTH LIQUID 100 PER BOX UNFLAVORED 60 MG OF IRON: at 11:54

## 2024-01-01 RX ADMIN — ACETAMINOPHEN 650 MG: 650 SOLUTION ORAL at 17:00

## 2024-01-01 RX ADMIN — ACETAMINOPHEN 650 MG: 325 TABLET ORAL at 09:39

## 2024-01-01 RX ADMIN — HEPARIN SODIUM 5000 UNITS: 1000 INJECTION INTRAVENOUS; SUBCUTANEOUS at 17:10

## 2024-01-01 RX ADMIN — ACETAMINOPHEN 650 MG: 650 SOLUTION ORAL at 10:19

## 2024-01-01 RX ADMIN — METHOCARBAMOL 500 MG: 500 TABLET ORAL at 12:36

## 2024-01-01 RX ADMIN — EPINEPHRINE 0.04 MCG/KG/MIN: 1 INJECTION INTRAMUSCULAR; INTRAVENOUS; SUBCUTANEOUS at 22:40

## 2024-01-01 RX ADMIN — HYDRALAZINE HYDROCHLORIDE 10 MG: 20 INJECTION INTRAMUSCULAR; INTRAVENOUS at 02:46

## 2024-01-01 RX ADMIN — NYSTATIN 500000 UNITS: 100000 SUSPENSION ORAL at 12:55

## 2024-01-01 RX ADMIN — INSULIN LISPRO 5 UNITS: 100 INJECTION, SOLUTION INTRAVENOUS; SUBCUTANEOUS at 21:27

## 2024-01-01 RX ADMIN — Medication: at 08:30

## 2024-01-01 RX ADMIN — MILRINONE LACTATE 0.25 MCG/KG/MIN: 200 INJECTION, SOLUTION INTRAVENOUS at 16:03

## 2024-01-01 RX ADMIN — CLEVIPIDINE 10 MG/HR: 0.5 EMULSION INTRAVENOUS at 22:46

## 2024-01-01 RX ADMIN — MIDAZOLAM HYDROCHLORIDE 2 MG: 1 INJECTION, SOLUTION INTRAMUSCULAR; INTRAVENOUS at 01:23

## 2024-01-01 RX ADMIN — Medication 10 MG: at 20:32

## 2024-01-01 RX ADMIN — LIDOCAINE 1 PATCH: 4 PATCH TOPICAL at 08:00

## 2024-01-01 RX ADMIN — HEPARIN SODIUM 5000 UNITS: 5000 INJECTION INTRAVENOUS; SUBCUTANEOUS at 15:30

## 2024-01-01 RX ADMIN — NYSTATIN 500000 UNITS: 100000 SUSPENSION ORAL at 13:17

## 2024-01-01 RX ADMIN — ISOSORBIDE DINITRATE 40 MG: 10 TABLET ORAL at 15:20

## 2024-01-01 RX ADMIN — PROPOFOL 45 MCG/KG/MIN: 10 INJECTION, EMULSION INTRAVENOUS at 00:47

## 2024-01-01 RX ADMIN — ACETAMINOPHEN 650 MG: 325 TABLET ORAL at 16:18

## 2024-01-01 RX ADMIN — HYDROMORPHONE HYDROCHLORIDE 1 MG: 2 TABLET ORAL at 09:05

## 2024-01-01 RX ADMIN — SUGAMMADEX 200 MG: 100 INJECTION, SOLUTION INTRAVENOUS at 13:11

## 2024-01-01 RX ADMIN — Medication 0.5 MG: at 06:37

## 2024-01-01 RX ADMIN — INSULIN HUMAN 12 UNITS: 100 INJECTION, SUSPENSION SUBCUTANEOUS at 10:05

## 2024-01-01 RX ADMIN — ASPIRIN 81 MG: 81 TABLET, COATED ORAL at 08:47

## 2024-01-01 RX ADMIN — SALINE NASAL SPRAY 5 SPRAY: 1.5 SOLUTION NASAL at 08:04

## 2024-01-01 RX ADMIN — LACTULOSE 20 G: 20 SOLUTION ORAL at 09:58

## 2024-01-01 RX ADMIN — SENNOSIDES AND DOCUSATE SODIUM 1 TABLET: 8.6; 5 TABLET ORAL at 08:19

## 2024-01-01 RX ADMIN — NYSTATIN 500000 UNITS: 100000 SUSPENSION ORAL at 00:27

## 2024-01-01 RX ADMIN — ONDANSETRON 4 MG: 2 INJECTION INTRAMUSCULAR; INTRAVENOUS at 19:47

## 2024-01-01 RX ADMIN — DIPHENHYDRAMINE HYDROCHLORIDE 25 MG: 25 CAPSULE ORAL at 10:25

## 2024-01-01 RX ADMIN — POLYETHYLENE GLYCOL 3350 17 G: 17 POWDER, FOR SOLUTION ORAL at 09:26

## 2024-01-01 RX ADMIN — MAGNESIUM SULFATE HEPTAHYDRATE 1 G: 1 INJECTION, SOLUTION INTRAVENOUS at 04:35

## 2024-01-01 RX ADMIN — SULFAMETHOXAZOLE AND TRIMETHOPRIM 80 MG OF TRIMETHOPRIM: 200; 40 SUSPENSION ORAL at 09:31

## 2024-01-01 RX ADMIN — CALCIUM GLUCONATE 2 G: 20 INJECTION, SOLUTION INTRAVENOUS at 20:12

## 2024-01-01 RX ADMIN — HEPARIN SODIUM 10 ML/HR: 1000 INJECTION INTRAVENOUS; SUBCUTANEOUS at 19:25

## 2024-01-01 RX ADMIN — OXYCODONE HYDROCHLORIDE 5 MG: 5 TABLET ORAL at 12:57

## 2024-01-01 RX ADMIN — OXYCODONE HYDROCHLORIDE 10 MG: 5 TABLET ORAL at 21:12

## 2024-01-01 RX ADMIN — VALGANCICLOVIR HYDROCHLORIDE 450 MG: 450 TABLET ORAL at 15:22

## 2024-01-01 RX ADMIN — POLYETHYLENE GLYCOL 3350 17 G: 17 POWDER, FOR SOLUTION ORAL at 09:08

## 2024-01-01 RX ADMIN — FOLIC ACID 1 MG: 1 TABLET ORAL at 08:02

## 2024-01-01 RX ADMIN — PREDNISONE 10 MG: 10 TABLET ORAL at 08:00

## 2024-01-01 RX ADMIN — PERFLUTREN 10 ML OF DILUTION: 6.52 INJECTION, SUSPENSION INTRAVENOUS at 08:40

## 2024-01-01 RX ADMIN — HYDROMORPHONE HYDROCHLORIDE 0.2 MG: 1 INJECTION, SOLUTION INTRAMUSCULAR; INTRAVENOUS; SUBCUTANEOUS at 23:16

## 2024-01-01 RX ADMIN — CYANOCOBALAMIN TAB 1000 MCG 500 MCG: 1000 TAB at 10:56

## 2024-01-01 RX ADMIN — SIROLIMUS 3 MG: 2 TABLET ORAL at 06:33

## 2024-01-01 RX ADMIN — CALCIUM CHLORIDE, MAGNESIUM CHLORIDE, DEXTROSE MONOHYDRATE, LACTIC ACID, SODIUM CHLORIDE, SODIUM BICARBONATE AND POTASSIUM CHLORIDE 1800 ML/HR: 3.68; 3.05; 22; 5.4; 6.46; 3.09; .314 INJECTION INTRAVENOUS at 16:24

## 2024-01-01 RX ADMIN — LEVOTHYROXINE SODIUM 200 MCG: 0.2 TABLET ORAL at 06:43

## 2024-01-01 RX ADMIN — OXYMETAZOLINE HYDROCHLORIDE 2 SPRAY: 0.05 SPRAY NASAL at 08:04

## 2024-01-01 RX ADMIN — HYDRALAZINE HYDROCHLORIDE 10 MG: 10 TABLET ORAL at 13:50

## 2024-01-01 RX ADMIN — INSULIN HUMAN 6 UNITS: 100 INJECTION, SOLUTION PARENTERAL at 21:17

## 2024-01-01 RX ADMIN — PREDNISONE 10 MG: 10 TABLET ORAL at 08:18

## 2024-01-01 RX ADMIN — ACETAMINOPHEN 650 MG: 650 SOLUTION ORAL at 09:36

## 2024-01-01 RX ADMIN — CYANOCOBALAMIN TAB 1000 MCG 500 MCG: 1000 TAB at 08:22

## 2024-01-01 RX ADMIN — INSULIN HUMAN 3 UNITS: 100 INJECTION, SOLUTION PARENTERAL at 06:36

## 2024-01-01 RX ADMIN — HYDROXYZINE HYDROCHLORIDE 25 MG: 25 TABLET, FILM COATED ORAL at 03:10

## 2024-01-01 RX ADMIN — ACETAMINOPHEN 650 MG: 325 TABLET ORAL at 15:12

## 2024-01-01 RX ADMIN — ALBUMIN HUMAN 12.5 G: 50 SOLUTION INTRAVENOUS at 13:50

## 2024-01-01 RX ADMIN — LEVOTHYROXINE SODIUM 200 MCG: 0.2 TABLET ORAL at 05:50

## 2024-01-01 RX ADMIN — PREDNISONE 10 MG: 10 TABLET ORAL at 08:25

## 2024-01-01 RX ADMIN — ROSUVASTATIN CALCIUM 40 MG: 40 TABLET, FILM COATED ORAL at 20:06

## 2024-01-01 RX ADMIN — Medication 1 TABLET: at 09:20

## 2024-01-01 RX ADMIN — HEPARIN SODIUM 10 ML/HR: 1000 INJECTION INTRAVENOUS; SUBCUTANEOUS at 15:48

## 2024-01-01 RX ADMIN — SENNOSIDES AND DOCUSATE SODIUM 1 TABLET: 8.6; 5 TABLET ORAL at 09:35

## 2024-01-01 RX ADMIN — SENNOSIDES AND DOCUSATE SODIUM 2 TABLET: 8.6; 5 TABLET ORAL at 09:17

## 2024-01-01 RX ADMIN — MILRINONE LACTATE 0.25 MCG/KG/MIN: 200 INJECTION, SOLUTION INTRAVENOUS at 16:30

## 2024-01-01 RX ADMIN — PROPOFOL 45 MCG/KG/MIN: 10 INJECTION, EMULSION INTRAVENOUS at 16:31

## 2024-01-01 RX ADMIN — Medication 800 MG: at 08:04

## 2024-01-01 RX ADMIN — POTASSIUM CHLORIDE 20 MEQ: 14.9 INJECTION, SOLUTION INTRAVENOUS at 10:15

## 2024-01-01 RX ADMIN — HYDROMORPHONE HYDROCHLORIDE 0.2 MG: 1 INJECTION, SOLUTION INTRAMUSCULAR; INTRAVENOUS; SUBCUTANEOUS at 11:56

## 2024-01-01 RX ADMIN — CALCIUM GLUCONATE 1 G: 20 INJECTION, SOLUTION INTRAVENOUS at 02:10

## 2024-01-01 RX ADMIN — LEVOTHYROXINE SODIUM 250 MCG: 0.2 TABLET ORAL at 06:08

## 2024-01-01 RX ADMIN — SULFAMETHOXAZOLE AND TRIMETHOPRIM 80 MG OF TRIMETHOPRIM: 200; 40 SUSPENSION ORAL at 09:06

## 2024-01-01 RX ADMIN — SIROLIMUS 2.5 MG: 1 TABLET ORAL at 06:42

## 2024-01-01 RX ADMIN — SODIUM CHLORIDE, POTASSIUM CHLORIDE, SODIUM LACTATE AND CALCIUM CHLORIDE 10 ML/HR: 600; 310; 30; 20 INJECTION, SOLUTION INTRAVENOUS at 21:07

## 2024-01-01 RX ADMIN — ACETAMINOPHEN 650 MG: 325 TABLET ORAL at 23:26

## 2024-01-01 RX ADMIN — DIBASIC SODIUM PHOSPHATE, MONOBASIC POTASSIUM PHOSPHATE AND MONOBASIC SODIUM PHOSPHATE 500 MG: 852; 155; 130 TABLET ORAL at 08:02

## 2024-01-01 RX ADMIN — SODIUM PHOSPHATE, MONOBASIC, MONOHYDRATE AND SODIUM PHOSPHATE, DIBASIC, ANHYDROUS 15 MMOL: 142; 276 INJECTION, SOLUTION INTRAVENOUS at 02:36

## 2024-01-01 RX ADMIN — PIPERACILLIN SODIUM AND TAZOBACTAM SODIUM 3.38 G: 3; .375 INJECTION, SOLUTION INTRAVENOUS at 07:48

## 2024-01-01 RX ADMIN — DIBASIC SODIUM PHOSPHATE, MONOBASIC POTASSIUM PHOSPHATE AND MONOBASIC SODIUM PHOSPHATE 500 MG: 852; 155; 130 TABLET ORAL at 12:24

## 2024-01-01 RX ADMIN — CALCIUM CHLORIDE, MAGNESIUM CHLORIDE, DEXTROSE MONOHYDRATE, LACTIC ACID, SODIUM CHLORIDE, SODIUM BICARBONATE AND POTASSIUM CHLORIDE 2400 ML/HR: 3.68; 3.05; 22; 5.4; 6.46; 3.09; .314 INJECTION INTRAVENOUS at 15:35

## 2024-01-01 RX ADMIN — PROPOFOL 39.99 MCG/KG/MIN: 10 INJECTION, EMULSION INTRAVENOUS at 18:48

## 2024-01-01 RX ADMIN — HEPARIN SODIUM 5000 UNITS: 5000 INJECTION INTRAVENOUS; SUBCUTANEOUS at 23:42

## 2024-01-01 RX ADMIN — Medication 1 TABLET: at 08:00

## 2024-01-01 RX ADMIN — SIROLIMUS 2.5 MG: 1 TABLET ORAL at 06:48

## 2024-01-01 RX ADMIN — QUETIAPINE 50 MG: 25 TABLET ORAL at 20:46

## 2024-01-01 RX ADMIN — SENNOSIDES AND DOCUSATE SODIUM 2 TABLET: 8.6; 5 TABLET ORAL at 08:39

## 2024-01-01 RX ADMIN — MILRINONE LACTATE 0.25 MCG/KG/MIN: 200 INJECTION, SOLUTION INTRAVENOUS at 20:09

## 2024-01-01 RX ADMIN — Medication 10 MG: at 20:52

## 2024-01-01 RX ADMIN — CEPHALEXIN 500 MG: 500 CAPSULE ORAL at 13:21

## 2024-01-01 RX ADMIN — NYSTATIN 500000 UNITS: 100000 SUSPENSION ORAL at 18:30

## 2024-01-01 RX ADMIN — OXYCODONE HYDROCHLORIDE 5 MG: 5 TABLET ORAL at 08:38

## 2024-01-01 RX ADMIN — MYCOPHENOLIC ACID 360 MG: 360 TABLET, DELAYED RELEASE ORAL at 06:17

## 2024-01-01 RX ADMIN — TACROLIMUS 2 MG: 1 CAPSULE ORAL at 18:47

## 2024-01-01 RX ADMIN — Medication 0.5 MG: at 06:30

## 2024-01-01 RX ADMIN — OXYCODONE HYDROCHLORIDE 5 MG: 5 TABLET ORAL at 08:49

## 2024-01-01 RX ADMIN — OXYCODONE HYDROCHLORIDE 5 MG: 5 TABLET ORAL at 11:32

## 2024-01-01 RX ADMIN — DEXMEDETOMIDINE HYDROCHLORIDE 0.2 MCG/KG/HR: 400 INJECTION INTRAVENOUS at 02:01

## 2024-01-01 RX ADMIN — HEPARIN SODIUM 1000 UNITS/HR: 10000 INJECTION, SOLUTION INTRAVENOUS at 13:19

## 2024-01-01 RX ADMIN — METHYLPREDNISOLONE SODIUM SUCCINATE 1000 MG: 1 INJECTION, POWDER, LYOPHILIZED, FOR SOLUTION INTRAMUSCULAR; INTRAVENOUS at 00:41

## 2024-01-01 RX ADMIN — EPINEPHRINE 0.01 MCG/KG/MIN: 1 INJECTION INTRAMUSCULAR; INTRAVENOUS; SUBCUTANEOUS at 14:03

## 2024-01-01 RX ADMIN — ACETAMINOPHEN 650 MG: 325 TABLET ORAL at 12:12

## 2024-01-01 RX ADMIN — HEPARIN SODIUM 1000 UNITS: 1000 INJECTION INTRAVENOUS; SUBCUTANEOUS at 13:10

## 2024-01-01 RX ADMIN — VANCOMYCIN HYDROCHLORIDE 1000 MG: 1 INJECTION, SOLUTION INTRAVENOUS at 09:21

## 2024-01-01 RX ADMIN — ALBUMIN HUMAN 25 G: 0.25 SOLUTION INTRAVENOUS at 03:13

## 2024-01-01 RX ADMIN — Medication 40 PERCENT: at 08:09

## 2024-01-01 RX ADMIN — TACROLIMUS 2 MG: 1 CAPSULE ORAL at 18:18

## 2024-01-01 RX ADMIN — VANCOMYCIN HYDROCHLORIDE 1750 MG: 5 INJECTION, POWDER, LYOPHILIZED, FOR SOLUTION INTRAVENOUS at 04:27

## 2024-01-01 RX ADMIN — ACETAMINOPHEN 650 MG: 325 TABLET ORAL at 09:18

## 2024-01-01 RX ADMIN — PIPERACILLIN SODIUM AND TAZOBACTAM SODIUM 4.5 G: 4; .5 INJECTION, SOLUTION INTRAVENOUS at 19:39

## 2024-01-01 RX ADMIN — MINERAL SUPPLEMENT IRON 300 MG / 5 ML STRENGTH LIQUID 100 PER BOX UNFLAVORED 60 MG OF IRON: at 09:07

## 2024-01-01 RX ADMIN — CEPHALEXIN 500 MG: 500 CAPSULE ORAL at 22:00

## 2024-01-01 RX ADMIN — PIPERACILLIN SODIUM AND TAZOBACTAM SODIUM 3.38 G: 3; .375 INJECTION, SOLUTION INTRAVENOUS at 16:08

## 2024-01-01 RX ADMIN — ACETAMINOPHEN 650 MG: 325 TABLET ORAL at 20:15

## 2024-01-01 RX ADMIN — PROPOFOL 40 MCG/KG/MIN: 10 INJECTION, EMULSION INTRAVENOUS at 11:35

## 2024-01-01 RX ADMIN — SALINE NASAL SPRAY 5 SPRAY: 1.5 SOLUTION NASAL at 08:24

## 2024-01-01 RX ADMIN — Medication 1 TABLET: at 08:15

## 2024-01-01 RX ADMIN — ONDANSETRON 4 MG: 2 INJECTION INTRAMUSCULAR; INTRAVENOUS at 18:07

## 2024-01-01 RX ADMIN — HYDROMORPHONE HYDROCHLORIDE 0.4 MG: 1 INJECTION, SOLUTION INTRAMUSCULAR; INTRAVENOUS; SUBCUTANEOUS at 21:52

## 2024-01-01 RX ADMIN — MINERAL SUPPLEMENT IRON 300 MG / 5 ML STRENGTH LIQUID 100 PER BOX UNFLAVORED 60 MG OF IRON: at 10:22

## 2024-01-01 RX ADMIN — NYSTATIN 500000 UNITS: 100000 SUSPENSION ORAL at 06:14

## 2024-01-01 RX ADMIN — ACETAMINOPHEN 650 MG: 650 SOLUTION ORAL at 20:15

## 2024-01-01 RX ADMIN — NYSTATIN 500000 UNITS: 100000 SUSPENSION ORAL at 08:16

## 2024-01-01 RX ADMIN — INSULIN LISPRO 2 UNITS: 100 INJECTION, SOLUTION INTRAVENOUS; SUBCUTANEOUS at 22:04

## 2024-01-01 RX ADMIN — SIROLIMUS 1 MG: 1 SOLUTION ORAL at 14:05

## 2024-01-01 RX ADMIN — SODIUM CHLORIDE, POTASSIUM CHLORIDE, SODIUM LACTATE AND CALCIUM CHLORIDE 250 ML: 600; 310; 30; 20 INJECTION, SOLUTION INTRAVENOUS at 23:58

## 2024-01-01 RX ADMIN — CEFAZOLIN SODIUM 2 G: 2 INJECTION, SOLUTION INTRAVENOUS at 22:02

## 2024-01-01 RX ADMIN — OXYCODONE HYDROCHLORIDE 5 MG: 5 TABLET ORAL at 09:26

## 2024-01-01 RX ADMIN — POTASSIUM & SODIUM PHOSPHATES POWDER PACK 280-160-250 MG 2 PACKET: 280-160-250 PACK at 16:18

## 2024-01-01 RX ADMIN — FERROUS SULFATE TAB 325 MG (65 MG ELEMENTAL FE) 1 TABLET: 325 (65 FE) TAB at 12:14

## 2024-01-01 RX ADMIN — Medication 8 MG/HR: at 06:46

## 2024-01-01 RX ADMIN — ACETAMINOPHEN 650 MG: 325 TABLET ORAL at 20:04

## 2024-01-01 RX ADMIN — PANTOPRAZOLE SODIUM 40 MG: 40 INJECTION, POWDER, FOR SOLUTION INTRAVENOUS at 09:11

## 2024-01-01 RX ADMIN — Medication 2 G: at 21:27

## 2024-01-01 RX ADMIN — TACROLIMUS 2 MG: 1 CAPSULE ORAL at 19:03

## 2024-01-01 RX ADMIN — PANTOPRAZOLE SODIUM 40 MG: 40 TABLET, DELAYED RELEASE ORAL at 08:43

## 2024-01-01 RX ADMIN — PREDNISONE 10 MG: 10 TABLET ORAL at 08:14

## 2024-01-01 RX ADMIN — SENNOSIDES AND DOCUSATE SODIUM 2 TABLET: 8.6; 5 TABLET ORAL at 20:29

## 2024-01-01 RX ADMIN — ACETAMINOPHEN 650 MG: 325 TABLET ORAL at 04:58

## 2024-01-01 RX ADMIN — FERROUS SULFATE TAB 325 MG (65 MG ELEMENTAL FE) 1 TABLET: 325 (65 FE) TAB at 09:22

## 2024-01-01 RX ADMIN — PREDNISONE 10 MG: 10 TABLET ORAL at 10:27

## 2024-01-01 RX ADMIN — SENNOSIDES AND DOCUSATE SODIUM 2 TABLET: 8.6; 5 TABLET ORAL at 09:37

## 2024-01-01 RX ADMIN — POTASSIUM & SODIUM PHOSPHATES POWDER PACK 280-160-250 MG 2 PACKET: 280-160-250 PACK at 09:16

## 2024-01-01 RX ADMIN — HYDRALAZINE HYDROCHLORIDE 100 MG: 100 TABLET, FILM COATED ORAL at 15:02

## 2024-01-01 RX ADMIN — HEPARIN SODIUM 1000 UNITS/HR: 10000 INJECTION, SOLUTION INTRAVENOUS at 22:20

## 2024-01-01 RX ADMIN — DIBASIC SODIUM PHOSPHATE, MONOBASIC POTASSIUM PHOSPHATE AND MONOBASIC SODIUM PHOSPHATE 500 MG: 852; 155; 130 TABLET ORAL at 14:36

## 2024-01-01 RX ADMIN — HYDROMORPHONE HYDROCHLORIDE 1 MG: 2 TABLET ORAL at 01:52

## 2024-01-01 RX ADMIN — HEPARIN SODIUM 5000 UNITS: 5000 INJECTION INTRAVENOUS; SUBCUTANEOUS at 18:50

## 2024-01-01 RX ADMIN — OXYMETAZOLINE HYDROCHLORIDE 2 SPRAY: 0.05 SPRAY NASAL at 08:45

## 2024-01-01 RX ADMIN — INSULIN HUMAN 3 UNITS: 100 INJECTION, SOLUTION PARENTERAL at 09:05

## 2024-01-01 RX ADMIN — INSULIN LISPRO 1 UNITS: 100 INJECTION, SOLUTION INTRAVENOUS; SUBCUTANEOUS at 13:10

## 2024-01-01 RX ADMIN — ACETAMINOPHEN 650 MG: 650 SOLUTION ORAL at 02:40

## 2024-01-01 RX ADMIN — SIROLIMUS 2.5 MG: 1 TABLET ORAL at 06:33

## 2024-01-01 RX ADMIN — HYDRALAZINE HYDROCHLORIDE 100 MG: 100 TABLET, FILM COATED ORAL at 08:43

## 2024-01-01 RX ADMIN — FERROUS SULFATE TAB 325 MG (65 MG ELEMENTAL FE) 1 TABLET: 325 (65 FE) TAB at 08:10

## 2024-01-01 RX ADMIN — ACETAMINOPHEN 650 MG: 325 TABLET ORAL at 09:07

## 2024-01-01 RX ADMIN — MILRINONE LACTATE 0.25 MCG/KG/MIN: 200 INJECTION, SOLUTION INTRAVENOUS at 06:55

## 2024-01-01 RX ADMIN — ONDANSETRON 4 MG: 2 INJECTION INTRAMUSCULAR; INTRAVENOUS at 07:22

## 2024-01-01 RX ADMIN — NYSTATIN 500000 UNITS: 100000 SUSPENSION ORAL at 18:29

## 2024-01-01 RX ADMIN — DOBUTAMINE IN DEXTROSE 7.5 MCG/KG/MIN: 400 INJECTION, SOLUTION INTRAVENOUS at 10:45

## 2024-01-01 RX ADMIN — IPRATROPIUM BROMIDE AND ALBUTEROL SULFATE 3 ML: .5; 3 SOLUTION RESPIRATORY (INHALATION) at 18:05

## 2024-01-01 RX ADMIN — CALCIUM GLUCONATE 1 G: 20 INJECTION, SOLUTION INTRAVENOUS at 13:11

## 2024-01-01 RX ADMIN — MYCOPHENOLIC ACID 360 MG: 360 TABLET, DELAYED RELEASE ORAL at 06:09

## 2024-01-01 RX ADMIN — Medication 0.5 MG: at 12:04

## 2024-01-01 RX ADMIN — PANTOPRAZOLE SODIUM 40 MG: 40 TABLET, DELAYED RELEASE ORAL at 08:03

## 2024-01-01 RX ADMIN — PANTOPRAZOLE SODIUM 40 MG: 40 INJECTION, POWDER, FOR SOLUTION INTRAVENOUS at 08:05

## 2024-01-01 RX ADMIN — VALGANCICLOVIR 450 MG: 50 FOR SOLUTION ORAL at 08:23

## 2024-01-01 RX ADMIN — CALCIUM GLUCONATE 1 G: 20 INJECTION, SOLUTION INTRAVENOUS at 06:17

## 2024-01-01 RX ADMIN — CLEVIPIDINE 10 MG/HR: 0.5 EMULSION INTRAVENOUS at 14:10

## 2024-01-01 RX ADMIN — ONDANSETRON 4 MG: 2 INJECTION INTRAMUSCULAR; INTRAVENOUS at 17:19

## 2024-01-01 RX ADMIN — TRAZODONE HYDROCHLORIDE 25 MG: 50 TABLET ORAL at 20:15

## 2024-01-01 RX ADMIN — SENNOSIDES AND DOCUSATE SODIUM 1 TABLET: 8.6; 5 TABLET ORAL at 20:03

## 2024-01-01 RX ADMIN — OXYCODONE HYDROCHLORIDE 5 MG: 5 TABLET ORAL at 20:09

## 2024-01-01 RX ADMIN — ESOMEPRAZOLE MAGNESIUM 40 MG: 40 FOR SUSPENSION ORAL at 21:30

## 2024-01-01 RX ADMIN — LIDOCAINE 1 PATCH: 4 PATCH TOPICAL at 12:21

## 2024-01-01 RX ADMIN — ISOSORBIDE DINITRATE 40 MG: 10 TABLET ORAL at 16:43

## 2024-01-01 RX ADMIN — LIDOCAINE HYDROCHLORIDE 50 MG: 20 INJECTION, SOLUTION INFILTRATION; PERINEURAL at 18:49

## 2024-01-01 RX ADMIN — ALBUMIN HUMAN 25 G: 0.25 SOLUTION INTRAVENOUS at 14:11

## 2024-01-01 RX ADMIN — FOLIC ACID 1 MG: 1 TABLET ORAL at 09:43

## 2024-01-01 RX ADMIN — METHOCARBAMOL 500 MG: 500 TABLET ORAL at 17:39

## 2024-01-01 RX ADMIN — FERROUS SULFATE TAB 325 MG (65 MG ELEMENTAL FE) 1 TABLET: 325 (65 FE) TAB at 08:05

## 2024-01-01 RX ADMIN — FERROUS SULFATE TAB 325 MG (65 MG ELEMENTAL FE) 1 TABLET: 325 (65 FE) TAB at 09:20

## 2024-01-01 RX ADMIN — LEVOTHYROXINE SODIUM 200 MCG: 0.2 TABLET ORAL at 04:27

## 2024-01-01 RX ADMIN — INSULIN HUMAN 4 UNITS: 100 INJECTION, SOLUTION PARENTERAL at 06:34

## 2024-01-01 RX ADMIN — MICAFUNGIN SODIUM 100 MG: 100 INJECTION, POWDER, LYOPHILIZED, FOR SOLUTION INTRAVENOUS at 10:17

## 2024-01-01 RX ADMIN — FERROUS SULFATE TAB 325 MG (65 MG ELEMENTAL FE) 1 TABLET: 325 (65 FE) TAB at 13:33

## 2024-01-01 RX ADMIN — INSULIN LISPRO 2 UNITS: 100 INJECTION, SOLUTION INTRAVENOUS; SUBCUTANEOUS at 07:00

## 2024-01-01 RX ADMIN — Medication 1 TABLET: at 08:25

## 2024-01-01 RX ADMIN — INSULIN LISPRO 10 UNITS: 100 INJECTION, SOLUTION INTRAVENOUS; SUBCUTANEOUS at 09:04

## 2024-01-01 RX ADMIN — LEVOTHYROXINE SODIUM 250 MCG: 0.12 TABLET ORAL at 04:06

## 2024-01-01 RX ADMIN — ACETAMINOPHEN 650 MG: 325 TABLET ORAL at 20:03

## 2024-01-01 RX ADMIN — PREDNISONE 10 MG: 10 TABLET ORAL at 08:27

## 2024-01-01 RX ADMIN — ACETAMINOPHEN 650 MG: 325 TABLET ORAL at 05:19

## 2024-01-01 RX ADMIN — TACROLIMUS 2 MG: 1 CAPSULE ORAL at 18:11

## 2024-01-01 RX ADMIN — NYSTATIN 500000 UNITS: 100000 SUSPENSION ORAL at 21:13

## 2024-01-01 RX ADMIN — MULTIVIT AND MINERALS-FERROUS GLUCONATE 9 MG IRON/15 ML ORAL LIQUID 15 ML: at 09:21

## 2024-01-01 RX ADMIN — OXYCODONE HYDROCHLORIDE 10 MG: 5 TABLET ORAL at 19:55

## 2024-01-01 RX ADMIN — ISOSORBIDE DINITRATE 20 MG: 20 TABLET ORAL at 09:46

## 2024-01-01 RX ADMIN — INSULIN LISPRO 5 UNITS: 100 INJECTION, SOLUTION INTRAVENOUS; SUBCUTANEOUS at 09:46

## 2024-01-01 RX ADMIN — ACETAMINOPHEN 975 MG: 325 TABLET ORAL at 08:18

## 2024-01-01 RX ADMIN — SALINE NASAL SPRAY 5 SPRAY: 1.5 SOLUTION NASAL at 13:19

## 2024-01-01 RX ADMIN — HYDROMORPHONE HYDROCHLORIDE 0.2 MG: 1 INJECTION, SOLUTION INTRAMUSCULAR; INTRAVENOUS; SUBCUTANEOUS at 01:06

## 2024-01-01 RX ADMIN — NYSTATIN 500000 UNITS: 100000 SUSPENSION ORAL at 13:13

## 2024-01-01 RX ADMIN — NYSTATIN 500000 UNITS: 100000 SUSPENSION ORAL at 23:31

## 2024-01-01 RX ADMIN — CALCIUM CHLORIDE, MAGNESIUM CHLORIDE, DEXTROSE MONOHYDRATE, LACTIC ACID, SODIUM CHLORIDE, SODIUM BICARBONATE AND POTASSIUM CHLORIDE 2400 ML/HR: 3.68; 3.05; 22; 5.4; 6.46; 3.09; .314 INJECTION INTRAVENOUS at 17:00

## 2024-01-01 RX ADMIN — HYDROMORPHONE HYDROCHLORIDE 0.2 MG: 1 INJECTION, SOLUTION INTRAMUSCULAR; INTRAVENOUS; SUBCUTANEOUS at 19:58

## 2024-01-01 RX ADMIN — HYDROMORPHONE HYDROCHLORIDE 0.2 MG: 1 INJECTION, SOLUTION INTRAMUSCULAR; INTRAVENOUS; SUBCUTANEOUS at 12:18

## 2024-01-01 RX ADMIN — NYSTATIN 500000 UNITS: 100000 SUSPENSION ORAL at 18:18

## 2024-01-01 RX ADMIN — ALBUMIN HUMAN 12.5 G: 0.25 SOLUTION INTRAVENOUS at 10:35

## 2024-01-01 RX ADMIN — MILRINONE LACTATE 0.12 MCG/KG/MIN: 200 INJECTION, SOLUTION INTRAVENOUS at 17:34

## 2024-01-01 RX ADMIN — ONDANSETRON 4 MG: 2 INJECTION INTRAMUSCULAR; INTRAVENOUS at 07:55

## 2024-01-01 RX ADMIN — HEPARIN SODIUM 5000 UNITS: 5000 INJECTION INTRAVENOUS; SUBCUTANEOUS at 09:32

## 2024-01-01 RX ADMIN — SODIUM CHLORIDE, POTASSIUM CHLORIDE, SODIUM LACTATE AND CALCIUM CHLORIDE 30 ML/HR: 600; 310; 30; 20 INJECTION, SOLUTION INTRAVENOUS at 21:57

## 2024-01-01 RX ADMIN — TACROLIMUS 2 MG: 1 CAPSULE ORAL at 18:30

## 2024-01-01 RX ADMIN — ESOMEPRAZOLE MAGNESIUM 40 MG: 40 FOR SUSPENSION ORAL at 09:37

## 2024-01-01 RX ADMIN — PANTOPRAZOLE SODIUM 40 MG: 40 TABLET, DELAYED RELEASE ORAL at 16:00

## 2024-01-01 RX ADMIN — HEPARIN SODIUM 5000 UNITS: 5000 INJECTION INTRAVENOUS; SUBCUTANEOUS at 02:05

## 2024-01-01 RX ADMIN — SENNOSIDES AND DOCUSATE SODIUM 2 TABLET: 8.6; 5 TABLET ORAL at 21:18

## 2024-01-01 RX ADMIN — INSULIN LISPRO 3 UNITS: 100 INJECTION, SOLUTION INTRAVENOUS; SUBCUTANEOUS at 06:13

## 2024-01-01 RX ADMIN — FERROUS SULFATE TAB 325 MG (65 MG ELEMENTAL FE) 1 TABLET: 325 (65 FE) TAB at 10:56

## 2024-01-01 RX ADMIN — ACETAMINOPHEN 650 MG: 325 TABLET ORAL at 10:16

## 2024-01-01 RX ADMIN — ONDANSETRON 4 MG: 2 INJECTION INTRAMUSCULAR; INTRAVENOUS at 21:00

## 2024-01-01 RX ADMIN — ROCURONIUM BROMIDE 100 MG: 10 INJECTION INTRAVENOUS at 16:13

## 2024-01-01 RX ADMIN — SODIUM PHOSPHATE, MONOBASIC, MONOHYDRATE AND SODIUM PHOSPHATE, DIBASIC, ANHYDROUS 30 MMOL: 142; 276 INJECTION, SOLUTION INTRAVENOUS at 09:09

## 2024-01-01 RX ADMIN — ONDANSETRON 4 MG: 2 INJECTION INTRAMUSCULAR; INTRAVENOUS at 08:10

## 2024-01-01 RX ADMIN — ONDANSETRON 4 MG: 2 INJECTION INTRAMUSCULAR; INTRAVENOUS at 12:18

## 2024-01-01 RX ADMIN — QUETIAPINE 50 MG: 25 TABLET ORAL at 20:03

## 2024-01-01 RX ADMIN — MILRINONE LACTATE 0.25 MCG/KG/MIN: 200 INJECTION, SOLUTION INTRAVENOUS at 14:02

## 2024-01-01 RX ADMIN — CEPHALEXIN 500 MG: 500 CAPSULE ORAL at 21:18

## 2024-01-01 RX ADMIN — TACROLIMUS 2 MG: 1 CAPSULE ORAL at 06:17

## 2024-01-01 RX ADMIN — SODIUM PHOSPHATE 1 ENEMA: 7; 19 ENEMA RECTAL at 12:15

## 2024-01-01 RX ADMIN — INSULIN LISPRO 5 UNITS: 100 INJECTION, SOLUTION INTRAVENOUS; SUBCUTANEOUS at 20:15

## 2024-01-01 RX ADMIN — PIPERACILLIN SODIUM AND TAZOBACTAM SODIUM 3.38 G: 3; .375 INJECTION, SOLUTION INTRAVENOUS at 11:08

## 2024-01-01 RX ADMIN — PROPOFOL 20 MCG/KG/MIN: 10 INJECTION, EMULSION INTRAVENOUS at 15:30

## 2024-01-01 RX ADMIN — ONDANSETRON 4 MG: 2 INJECTION INTRAMUSCULAR; INTRAVENOUS at 12:03

## 2024-01-01 RX ADMIN — POTASSIUM & SODIUM PHOSPHATES POWDER PACK 280-160-250 MG 1 PACKET: 280-160-250 PACK at 17:29

## 2024-01-01 RX ADMIN — ACETAMINOPHEN 650 MG: 650 SOLUTION ORAL at 15:02

## 2024-01-01 RX ADMIN — ALBUMIN HUMAN 25 G: 250 SOLUTION INTRAVENOUS at 15:30

## 2024-01-01 RX ADMIN — Medication 10 MG: at 21:18

## 2024-01-01 RX ADMIN — PANTOPRAZOLE SODIUM 40 MG: 40 TABLET, DELAYED RELEASE ORAL at 20:21

## 2024-01-01 RX ADMIN — OXYCODONE HYDROCHLORIDE 5 MG: 5 TABLET ORAL at 20:10

## 2024-01-01 RX ADMIN — CALCIUM GLUCONATE 1 G: 20 INJECTION, SOLUTION INTRAVENOUS at 19:19

## 2024-01-01 RX ADMIN — INSULIN LISPRO 4 UNITS: 100 INJECTION, SOLUTION INTRAVENOUS; SUBCUTANEOUS at 22:20

## 2024-01-01 RX ADMIN — OXYCODONE HYDROCHLORIDE 5 MG: 5 TABLET ORAL at 21:29

## 2024-01-01 RX ADMIN — TACROLIMUS 4 MG: 1 CAPSULE ORAL at 06:17

## 2024-01-01 RX ADMIN — HYDROXYZINE HYDROCHLORIDE 25 MG: 25 TABLET, FILM COATED ORAL at 18:09

## 2024-01-01 RX ADMIN — CALCIUM 1250 MG: 500 TABLET ORAL at 08:05

## 2024-01-01 RX ADMIN — ASPIRIN 81 MG: 81 TABLET, COATED ORAL at 08:18

## 2024-01-01 RX ADMIN — ONDANSETRON 4 MG: 2 INJECTION INTRAMUSCULAR; INTRAVENOUS at 06:41

## 2024-01-01 RX ADMIN — HYDROMORPHONE HYDROCHLORIDE 0.2 MG: 1 INJECTION, SOLUTION INTRAMUSCULAR; INTRAVENOUS; SUBCUTANEOUS at 21:18

## 2024-01-01 RX ADMIN — PANTOPRAZOLE SODIUM 40 MG: 40 INJECTION, POWDER, FOR SOLUTION INTRAVENOUS at 09:30

## 2024-01-01 RX ADMIN — ONDANSETRON 4 MG: 2 INJECTION INTRAMUSCULAR; INTRAVENOUS at 00:40

## 2024-01-01 RX ADMIN — FERROUS SULFATE TAB 325 MG (65 MG ELEMENTAL FE) 1 TABLET: 325 (65 FE) TAB at 08:39

## 2024-01-01 RX ADMIN — PREDNISONE 10 MG: 10 TABLET ORAL at 08:10

## 2024-01-01 RX ADMIN — SENNOSIDES AND DOCUSATE SODIUM 1 TABLET: 8.6; 5 TABLET ORAL at 20:22

## 2024-01-01 RX ADMIN — OXYCODONE HYDROCHLORIDE 5 MG: 5 TABLET ORAL at 22:35

## 2024-01-01 RX ADMIN — VORICONAZOLE 340 MG: 10 INJECTION, POWDER, LYOPHILIZED, FOR SOLUTION INTRAVENOUS at 18:09

## 2024-01-01 RX ADMIN — HEPARIN SODIUM 5000 UNITS: 5000 INJECTION INTRAVENOUS; SUBCUTANEOUS at 03:27

## 2024-01-01 RX ADMIN — NYSTATIN 500000 UNITS: 100000 SUSPENSION ORAL at 18:27

## 2024-01-01 RX ADMIN — POLYETHYLENE GLYCOL 3350 17 G: 17 POWDER, FOR SOLUTION ORAL at 20:32

## 2024-01-01 RX ADMIN — CALCIUM CHLORIDE, MAGNESIUM CHLORIDE, DEXTROSE MONOHYDRATE, LACTIC ACID, SODIUM CHLORIDE, SODIUM BICARBONATE AND POTASSIUM CHLORIDE 2400 ML/HR: 3.68; 3.05; 22; 5.4; 6.46; 3.09; .314 INJECTION INTRAVENOUS at 07:31

## 2024-01-01 RX ADMIN — PREDNISONE 10 MG: 10 TABLET ORAL at 08:06

## 2024-01-01 RX ADMIN — ONDANSETRON 4 MG: 2 INJECTION INTRAMUSCULAR; INTRAVENOUS at 13:47

## 2024-01-01 RX ADMIN — INSULIN LISPRO 2 UNITS: 100 INJECTION, SOLUTION INTRAVENOUS; SUBCUTANEOUS at 06:45

## 2024-01-01 RX ADMIN — Medication 1 TABLET: at 08:49

## 2024-01-01 RX ADMIN — PANTOPRAZOLE SODIUM 40 MG: 40 INJECTION, POWDER, FOR SOLUTION INTRAVENOUS at 08:19

## 2024-01-01 RX ADMIN — LIDOCAINE 1 PATCH: 4 PATCH TOPICAL at 09:12

## 2024-01-01 RX ADMIN — ALBUMIN HUMAN 25 G: 0.25 SOLUTION INTRAVENOUS at 03:25

## 2024-01-01 RX ADMIN — ONDANSETRON 4 MG: 2 INJECTION INTRAMUSCULAR; INTRAVENOUS at 12:11

## 2024-01-01 RX ADMIN — HYDROMORPHONE HYDROCHLORIDE 0.2 MG: 1 INJECTION, SOLUTION INTRAMUSCULAR; INTRAVENOUS; SUBCUTANEOUS at 07:46

## 2024-01-01 RX ADMIN — DARBEPOETIN ALFA 100 MCG: 100 INJECTION, SOLUTION INTRAVENOUS; SUBCUTANEOUS at 08:21

## 2024-01-01 RX ADMIN — OXYCODONE HYDROCHLORIDE 2.5 MG: 5 TABLET ORAL at 21:25

## 2024-01-01 RX ADMIN — EPINEPHRINE 0.2 MCG/KG/MIN: 1 INJECTION INTRAMUSCULAR; INTRAVENOUS; SUBCUTANEOUS at 18:11

## 2024-01-01 RX ADMIN — VANCOMYCIN HYDROCHLORIDE 750 MG: 750 INJECTION, SOLUTION INTRAVENOUS at 20:44

## 2024-01-01 RX ADMIN — SENNOSIDES AND DOCUSATE SODIUM 1 TABLET: 8.6; 5 TABLET ORAL at 20:02

## 2024-01-01 RX ADMIN — OXYMETAZOLINE HYDROCHLORIDE 2 SPRAY: 0.05 SPRAY NASAL at 09:01

## 2024-01-01 RX ADMIN — NYSTATIN 500000 UNITS: 100000 SUSPENSION ORAL at 13:26

## 2024-01-01 RX ADMIN — METHOCARBAMOL 500 MG: 500 TABLET ORAL at 12:00

## 2024-01-01 RX ADMIN — INSULIN LISPRO 2 UNITS: 100 INJECTION, SOLUTION INTRAVENOUS; SUBCUTANEOUS at 20:29

## 2024-01-01 RX ADMIN — HYDROMORPHONE HYDROCHLORIDE 0.2 MG: 1 INJECTION, SOLUTION INTRAMUSCULAR; INTRAVENOUS; SUBCUTANEOUS at 21:14

## 2024-01-01 RX ADMIN — Medication 1 TABLET: at 08:48

## 2024-01-01 RX ADMIN — POLYETHYLENE GLYCOL 3350 17 G: 17 POWDER, FOR SOLUTION ORAL at 21:01

## 2024-01-01 RX ADMIN — SENNOSIDES AND DOCUSATE SODIUM 2 TABLET: 8.6; 5 TABLET ORAL at 08:15

## 2024-01-01 RX ADMIN — HYDRALAZINE HYDROCHLORIDE 5 MG: 20 INJECTION INTRAMUSCULAR; INTRAVENOUS at 06:31

## 2024-01-01 RX ADMIN — ACETAMINOPHEN 975 MG: 325 TABLET ORAL at 00:17

## 2024-01-01 RX ADMIN — LIDOCAINE HYDROCHLORIDE 30 MG: 20 INJECTION, SOLUTION INFILTRATION; PERINEURAL at 19:30

## 2024-01-01 RX ADMIN — NYSTATIN 500000 UNITS: 100000 SUSPENSION ORAL at 01:10

## 2024-01-01 RX ADMIN — NYSTATIN 500000 UNITS: 100000 SUSPENSION ORAL at 12:11

## 2024-01-01 RX ADMIN — LIDOCAINE 1 PATCH: 4 PATCH TOPICAL at 09:31

## 2024-01-01 RX ADMIN — INSULIN LISPRO 2 UNITS: 100 INJECTION, SOLUTION INTRAVENOUS; SUBCUTANEOUS at 21:18

## 2024-01-01 RX ADMIN — ACETAMINOPHEN 650 MG: 325 TABLET ORAL at 20:20

## 2024-01-01 RX ADMIN — CEFAZOLIN 2 G: 330 INJECTION, POWDER, FOR SOLUTION INTRAMUSCULAR; INTRAVENOUS at 11:38

## 2024-01-01 RX ADMIN — ACETAMINOPHEN 975 MG: 325 TABLET ORAL at 19:42

## 2024-01-01 RX ADMIN — CEPHALEXIN 500 MG: 500 CAPSULE ORAL at 06:31

## 2024-01-01 RX ADMIN — NYSTATIN 500000 UNITS: 100000 SUSPENSION ORAL at 12:00

## 2024-01-01 RX ADMIN — NYSTATIN 500000 UNITS: 100000 SUSPENSION ORAL at 09:10

## 2024-01-01 RX ADMIN — AMLODIPINE BESYLATE 2.5 MG: 5 TABLET ORAL at 08:34

## 2024-01-01 RX ADMIN — HEPARIN SODIUM 5000 UNITS: 5000 INJECTION INTRAVENOUS; SUBCUTANEOUS at 00:41

## 2024-01-01 RX ADMIN — CEPHALEXIN 500 MG: 250 FOR SUSPENSION ORAL at 20:47

## 2024-01-01 RX ADMIN — HYDROMORPHONE HYDROCHLORIDE 0.2 MG: 1 INJECTION, SOLUTION INTRAMUSCULAR; INTRAVENOUS; SUBCUTANEOUS at 12:22

## 2024-01-01 RX ADMIN — INSULIN LISPRO 1 UNITS: 100 INJECTION, SOLUTION INTRAVENOUS; SUBCUTANEOUS at 08:34

## 2024-01-01 RX ADMIN — LEVOTHYROXINE SODIUM 200 MCG: 0.2 TABLET ORAL at 06:22

## 2024-01-01 RX ADMIN — PREDNISONE 10 MG: 10 TABLET ORAL at 10:55

## 2024-01-01 RX ADMIN — SODIUM BICARBONATE 10 ML/HR: 84 INJECTION, SOLUTION INTRAVENOUS at 22:25

## 2024-01-01 RX ADMIN — NYSTATIN 500000 UNITS: 100000 SUSPENSION ORAL at 06:44

## 2024-01-01 RX ADMIN — POTASSIUM & SODIUM PHOSPHATES POWDER PACK 280-160-250 MG 2 PACKET: 280-160-250 PACK at 09:13

## 2024-01-01 RX ADMIN — HYDROMORPHONE HYDROCHLORIDE 0.2 MG: 1 INJECTION, SOLUTION INTRAMUSCULAR; INTRAVENOUS; SUBCUTANEOUS at 13:45

## 2024-01-01 RX ADMIN — INSULIN HUMAN 2 UNITS: 100 INJECTION, SOLUTION PARENTERAL at 09:50

## 2024-01-01 RX ADMIN — MYCOPHENOLIC ACID 180 MG: 180 TABLET, DELAYED RELEASE ORAL at 10:00

## 2024-01-01 RX ADMIN — ASPIRIN 81 MG: 81 TABLET, COATED ORAL at 09:48

## 2024-01-01 RX ADMIN — TACROLIMUS 4 MG: 1 CAPSULE ORAL at 06:11

## 2024-01-01 RX ADMIN — METHYLPREDNISOLONE SODIUM SUCCINATE 40 MG: 125 INJECTION, POWDER, FOR SOLUTION INTRAMUSCULAR; INTRAVENOUS at 19:19

## 2024-01-01 RX ADMIN — SIROLIMUS 2.5 MG: 1 TABLET ORAL at 06:08

## 2024-01-01 RX ADMIN — MAGNESIUM SULFATE HEPTAHYDRATE 4 G: 40 INJECTION, SOLUTION INTRAVENOUS at 08:00

## 2024-01-01 RX ADMIN — SULFAMETHOXAZOLE AND TRIMETHOPRIM 80 MG OF TRIMETHOPRIM: 200; 40 SUSPENSION ORAL at 09:08

## 2024-01-01 RX ADMIN — FERROUS SULFATE TAB 325 MG (65 MG ELEMENTAL FE) 1 TABLET: 325 (65 FE) TAB at 08:25

## 2024-01-01 RX ADMIN — DIBASIC SODIUM PHOSPHATE, MONOBASIC POTASSIUM PHOSPHATE AND MONOBASIC SODIUM PHOSPHATE 500 MG: 852; 155; 130 TABLET ORAL at 13:14

## 2024-01-01 RX ADMIN — CALCIUM GLUCONATE 1 G: 20 INJECTION, SOLUTION INTRAVENOUS at 02:31

## 2024-01-01 RX ADMIN — HYDROXYZINE HYDROCHLORIDE 25 MG: 25 TABLET, FILM COATED ORAL at 12:11

## 2024-01-01 RX ADMIN — QUETIAPINE 50 MG: 25 TABLET ORAL at 21:27

## 2024-01-01 RX ADMIN — ALBUMIN HUMAN 12.5 G: 0.05 INJECTION, SOLUTION INTRAVENOUS at 05:41

## 2024-01-01 RX ADMIN — TACROLIMUS 1.5 MG: 1 CAPSULE ORAL at 19:43

## 2024-01-01 RX ADMIN — CALCITRIOL 0.5 MCG: 1 SOLUTION ORAL at 08:49

## 2024-01-01 RX ADMIN — PANTOPRAZOLE SODIUM 40 MG: 40 INJECTION, POWDER, FOR SOLUTION INTRAVENOUS at 08:41

## 2024-01-01 RX ADMIN — HEPARIN SODIUM 5000 UNITS: 5000 INJECTION INTRAVENOUS; SUBCUTANEOUS at 02:49

## 2024-01-01 RX ADMIN — FERROUS SULFATE TAB 325 MG (65 MG ELEMENTAL FE) 1 TABLET: 325 (65 FE) TAB at 08:49

## 2024-01-01 RX ADMIN — ISOSORBIDE DINITRATE 40 MG: 10 TABLET ORAL at 00:34

## 2024-01-01 RX ADMIN — SIROLIMUS 2.5 MG: 1 TABLET ORAL at 06:21

## 2024-01-01 RX ADMIN — HYDROXYZINE HYDROCHLORIDE 25 MG: 25 TABLET, FILM COATED ORAL at 11:42

## 2024-01-01 RX ADMIN — SENNOSIDES AND DOCUSATE SODIUM 1 TABLET: 8.6; 5 TABLET ORAL at 09:26

## 2024-01-01 RX ADMIN — HEPARIN SODIUM 5000 UNITS: 5000 INJECTION INTRAVENOUS; SUBCUTANEOUS at 08:52

## 2024-01-01 RX ADMIN — ONDANSETRON 4 MG: 2 INJECTION INTRAMUSCULAR; INTRAVENOUS at 09:04

## 2024-01-01 RX ADMIN — PIPERACILLIN SODIUM AND TAZOBACTAM SODIUM 4.5 G: 4; .5 INJECTION, SOLUTION INTRAVENOUS at 16:09

## 2024-01-01 RX ADMIN — HYDRALAZINE HYDROCHLORIDE 100 MG: 100 TABLET, FILM COATED ORAL at 08:45

## 2024-01-01 RX ADMIN — FERROUS SULFATE TAB 325 MG (65 MG ELEMENTAL FE) 1 TABLET: 325 (65 FE) TAB at 09:53

## 2024-01-01 RX ADMIN — FUROSEMIDE 80 MG: 10 INJECTION, SOLUTION INTRAMUSCULAR; INTRAVENOUS at 11:32

## 2024-01-01 RX ADMIN — NYSTATIN 500000 UNITS: 100000 SUSPENSION ORAL at 00:39

## 2024-01-01 RX ADMIN — PANTOPRAZOLE SODIUM 40 MG: 40 TABLET, DELAYED RELEASE ORAL at 08:23

## 2024-01-01 RX ADMIN — Medication 2 G: at 22:33

## 2024-01-01 RX ADMIN — HYDROMORPHONE HYDROCHLORIDE 0.2 MG: 1 INJECTION, SOLUTION INTRAMUSCULAR; INTRAVENOUS; SUBCUTANEOUS at 21:00

## 2024-01-01 RX ADMIN — ASPIRIN 81 MG CHEWABLE TABLET 81 MG: 81 TABLET CHEWABLE at 09:00

## 2024-01-01 RX ADMIN — ACETAMINOPHEN 650 MG: 325 TABLET ORAL at 13:40

## 2024-01-01 RX ADMIN — ASPIRIN 81 MG CHEWABLE TABLET 81 MG: 81 TABLET CHEWABLE at 09:07

## 2024-01-01 RX ADMIN — HYDROMORPHONE HYDROCHLORIDE 0.2 MG: 1 INJECTION, SOLUTION INTRAMUSCULAR; INTRAVENOUS; SUBCUTANEOUS at 22:43

## 2024-01-01 RX ADMIN — TRAZODONE HYDROCHLORIDE 50 MG: 50 TABLET ORAL at 21:13

## 2024-01-01 RX ADMIN — ROCURONIUM BROMIDE 50 MG: 10 INJECTION, SOLUTION INTRAVENOUS at 18:49

## 2024-01-01 RX ADMIN — CALCIUM GLUCONATE 1 G: 20 INJECTION, SOLUTION INTRAVENOUS at 16:35

## 2024-01-01 RX ADMIN — OXYCODONE HYDROCHLORIDE 5 MG: 5 TABLET ORAL at 04:02

## 2024-01-01 RX ADMIN — VANCOMYCIN HYDROCHLORIDE 1000 MG: 1 INJECTION, SOLUTION INTRAVENOUS at 09:12

## 2024-01-01 RX ADMIN — LEVOTHYROXINE SODIUM 250 MCG: 0.2 TABLET ORAL at 06:48

## 2024-01-01 RX ADMIN — ALBUMIN HUMAN 12.5 G: 0.25 SOLUTION INTRAVENOUS at 17:40

## 2024-01-01 RX ADMIN — HYDRALAZINE HYDROCHLORIDE 100 MG: 100 TABLET, FILM COATED ORAL at 08:52

## 2024-01-01 RX ADMIN — HEPARIN SODIUM: 1000 INJECTION, SOLUTION INTRAVENOUS; SUBCUTANEOUS at 00:15

## 2024-01-01 RX ADMIN — CALCIUM CHLORIDE, MAGNESIUM CHLORIDE, DEXTROSE MONOHYDRATE, LACTIC ACID, SODIUM CHLORIDE, SODIUM BICARBONATE AND POTASSIUM CHLORIDE 2400 ML/HR: 3.68; 3.05; 22; 5.4; 6.46; 3.09; .314 INJECTION INTRAVENOUS at 08:15

## 2024-01-01 RX ADMIN — LEVOTHYROXINE SODIUM 200 MCG: 0.2 TABLET ORAL at 05:42

## 2024-01-01 RX ADMIN — ESOMEPRAZOLE MAGNESIUM 40 MG: 40 FOR SUSPENSION ORAL at 06:26

## 2024-01-01 RX ADMIN — HEPARIN SODIUM 5000 UNITS: 5000 INJECTION INTRAVENOUS; SUBCUTANEOUS at 03:26

## 2024-01-01 RX ADMIN — Medication 2 G: at 20:47

## 2024-01-01 RX ADMIN — DOBUTAMINE HYDROCHLORIDE 5 MCG/KG/MIN: 400 INJECTION INTRAVENOUS at 10:24

## 2024-01-01 RX ADMIN — SIROLIMUS 0.5 MG: 0.5 TABLET, SUGAR COATED ORAL at 08:34

## 2024-01-01 RX ADMIN — Medication 1 TABLET: at 08:19

## 2024-01-01 RX ADMIN — DEXMEDETOMIDINE HYDROCHLORIDE 1.2 MCG/KG/HR: 400 INJECTION INTRAVENOUS at 23:53

## 2024-01-01 RX ADMIN — DOBUTAMINE IN DEXTROSE 5 MCG/KG/MIN: 400 INJECTION, SOLUTION INTRAVENOUS at 06:30

## 2024-01-01 RX ADMIN — ONDANSETRON 4 MG: 2 INJECTION INTRAMUSCULAR; INTRAVENOUS at 12:28

## 2024-01-01 RX ADMIN — NYSTATIN 500000 UNITS: 100000 SUSPENSION ORAL at 23:38

## 2024-01-01 RX ADMIN — PIPERACILLIN SODIUM AND TAZOBACTAM SODIUM 3.38 G: 3; .375 INJECTION, SOLUTION INTRAVENOUS at 07:41

## 2024-01-01 RX ADMIN — HEPARIN SODIUM 5000 UNITS: 5000 INJECTION INTRAVENOUS; SUBCUTANEOUS at 00:12

## 2024-01-01 RX ADMIN — PROPOFOL 50 MCG/KG/MIN: 10 INJECTION, EMULSION INTRAVENOUS at 01:00

## 2024-01-01 RX ADMIN — CALCIUM GLUCONATE 1 G: 20 INJECTION, SOLUTION INTRAVENOUS at 03:57

## 2024-01-01 RX ADMIN — INSULIN HUMAN 1 UNITS: 100 INJECTION, SOLUTION PARENTERAL at 12:14

## 2024-01-01 RX ADMIN — INSULIN LISPRO 5 UNITS: 100 INJECTION, SOLUTION INTRAVENOUS; SUBCUTANEOUS at 18:11

## 2024-01-01 RX ADMIN — ONDANSETRON 4 MG: 2 INJECTION INTRAMUSCULAR; INTRAVENOUS at 21:24

## 2024-01-01 RX ADMIN — ONDANSETRON 4 MG: 2 INJECTION INTRAMUSCULAR; INTRAVENOUS at 12:00

## 2024-01-01 RX ADMIN — HYDROMORPHONE HYDROCHLORIDE 0.2 MG: 1 INJECTION, SOLUTION INTRAMUSCULAR; INTRAVENOUS; SUBCUTANEOUS at 16:14

## 2024-01-01 RX ADMIN — Medication 10 MG: at 18:18

## 2024-01-01 RX ADMIN — LEVOTHYROXINE SODIUM 200 MCG: 0.2 TABLET ORAL at 06:07

## 2024-01-01 RX ADMIN — DEXMEDETOMIDINE HYDROCHLORIDE 0.2 MCG/KG/HR: 400 INJECTION INTRAVENOUS at 04:20

## 2024-01-01 RX ADMIN — MUPIROCIN 1 APPLICATION: 20 OINTMENT TOPICAL at 08:16

## 2024-01-01 RX ADMIN — HYDROMORPHONE HYDROCHLORIDE 0.2 MG: 1 INJECTION, SOLUTION INTRAMUSCULAR; INTRAVENOUS; SUBCUTANEOUS at 10:39

## 2024-01-01 RX ADMIN — ASPIRIN 81 MG: 81 TABLET, COATED ORAL at 09:26

## 2024-01-01 RX ADMIN — PANTOPRAZOLE SODIUM 40 MG: 40 TABLET, DELAYED RELEASE ORAL at 14:26

## 2024-01-01 RX ADMIN — SENNOSIDES AND DOCUSATE SODIUM 1 TABLET: 8.6; 5 TABLET ORAL at 08:10

## 2024-01-01 RX ADMIN — MINERAL SUPPLEMENT IRON 300 MG / 5 ML STRENGTH LIQUID 100 PER BOX UNFLAVORED 60 MG OF IRON: at 09:49

## 2024-01-01 RX ADMIN — INSULIN LISPRO 2 UNITS: 100 INJECTION, SOLUTION INTRAVENOUS; SUBCUTANEOUS at 17:28

## 2024-01-01 RX ADMIN — ALBUMIN HUMAN 25 G: 0.25 SOLUTION INTRAVENOUS at 19:00

## 2024-01-01 RX ADMIN — METHOCARBAMOL 500 MG: 500 TABLET ORAL at 18:14

## 2024-01-01 RX ADMIN — HYDRALAZINE HYDROCHLORIDE 75 MG: 50 TABLET ORAL at 15:37

## 2024-01-01 RX ADMIN — ROCURONIUM BROMIDE 10 MG: 10 INJECTION, SOLUTION INTRAVENOUS at 20:56

## 2024-01-01 RX ADMIN — DEXMEDETOMIDINE HYDROCHLORIDE 0.2 MCG/KG/HR: 400 INJECTION INTRAVENOUS at 12:17

## 2024-01-01 RX ADMIN — PIPERACILLIN SODIUM AND TAZOBACTAM SODIUM 3.38 G: 3; .375 INJECTION, SOLUTION INTRAVENOUS at 13:20

## 2024-01-01 RX ADMIN — VANCOMYCIN HYDROCHLORIDE 750 MG: 750 INJECTION, SOLUTION INTRAVENOUS at 09:24

## 2024-01-01 RX ADMIN — DOBUTAMINE HYDROCHLORIDE 2.5 MCG/KG/MIN: 400 INJECTION INTRAVENOUS at 10:34

## 2024-01-01 RX ADMIN — FOLIC ACID 1 MG: 1 TABLET ORAL at 10:55

## 2024-01-01 RX ADMIN — SODIUM BICARBONATE 10 ML/HR: 84 INJECTION, SOLUTION INTRAVENOUS at 11:30

## 2024-01-01 RX ADMIN — METHYLPREDNISOLONE SODIUM SUCCINATE 40 MG: 125 INJECTION, POWDER, FOR SOLUTION INTRAMUSCULAR; INTRAVENOUS at 20:09

## 2024-01-01 RX ADMIN — HYDROMORPHONE HYDROCHLORIDE 0.2 MG: 1 INJECTION, SOLUTION INTRAMUSCULAR; INTRAVENOUS; SUBCUTANEOUS at 19:22

## 2024-01-01 RX ADMIN — PIPERACILLIN SODIUM AND TAZOBACTAM SODIUM 4.5 G: 4; .5 INJECTION, SOLUTION INTRAVENOUS at 22:05

## 2024-01-01 RX ADMIN — HEPARIN SODIUM 5000 UNITS: 5000 INJECTION INTRAVENOUS; SUBCUTANEOUS at 17:56

## 2024-01-01 RX ADMIN — ISOSORBIDE DINITRATE 40 MG: 10 TABLET ORAL at 08:32

## 2024-01-01 RX ADMIN — LIDOCAINE 1 PATCH: 4 PATCH TOPICAL at 08:07

## 2024-01-01 RX ADMIN — HYDRALAZINE HYDROCHLORIDE 10 MG: 20 INJECTION INTRAMUSCULAR; INTRAVENOUS at 16:21

## 2024-01-01 RX ADMIN — FERROUS SULFATE TAB 325 MG (65 MG ELEMENTAL FE) 1 TABLET: 325 (65 FE) TAB at 07:48

## 2024-01-01 RX ADMIN — CYCLOBENZAPRINE HYDROCHLORIDE 5 MG: 5 TABLET, FILM COATED ORAL at 08:44

## 2024-01-01 RX ADMIN — HYDROMORPHONE HYDROCHLORIDE 0.2 MG: 1 INJECTION, SOLUTION INTRAMUSCULAR; INTRAVENOUS; SUBCUTANEOUS at 13:42

## 2024-01-01 RX ADMIN — TACROLIMUS 1 MG: 1 CAPSULE ORAL at 12:17

## 2024-01-01 RX ADMIN — Medication 0.02 MCG/KG/MIN: at 18:39

## 2024-01-01 RX ADMIN — HEPARIN SODIUM 5000 UNITS: 5000 INJECTION INTRAVENOUS; SUBCUTANEOUS at 09:58

## 2024-01-01 RX ADMIN — NYSTATIN 500000 UNITS: 100000 SUSPENSION ORAL at 12:38

## 2024-01-01 RX ADMIN — HYDROMORPHONE HYDROCHLORIDE 0.2 MG: 1 INJECTION, SOLUTION INTRAMUSCULAR; INTRAVENOUS; SUBCUTANEOUS at 14:37

## 2024-01-01 RX ADMIN — CALCIUM GLUCONATE 1 G: 20 INJECTION, SOLUTION INTRAVENOUS at 05:09

## 2024-01-01 RX ADMIN — Medication 2 G: at 11:15

## 2024-01-01 RX ADMIN — NYSTATIN 500000 UNITS: 100000 SUSPENSION ORAL at 18:11

## 2024-01-01 RX ADMIN — NYSTATIN 500000 UNITS: 100000 SUSPENSION ORAL at 17:31

## 2024-01-01 RX ADMIN — IRON SUCROSE 200 MG: 20 INJECTION, SOLUTION INTRAVENOUS at 06:33

## 2024-01-01 RX ADMIN — HYDROMORPHONE HYDROCHLORIDE 0.4 MG: 1 INJECTION, SOLUTION INTRAMUSCULAR; INTRAVENOUS; SUBCUTANEOUS at 04:38

## 2024-01-01 RX ADMIN — FENTANYL CITRATE 50 MCG: 50 INJECTION, SOLUTION INTRAMUSCULAR; INTRAVENOUS at 11:37

## 2024-01-01 RX ADMIN — DIBASIC SODIUM PHOSPHATE, MONOBASIC POTASSIUM PHOSPHATE AND MONOBASIC SODIUM PHOSPHATE 500 MG: 852; 155; 130 TABLET ORAL at 06:09

## 2024-01-01 RX ADMIN — OXYCODONE HYDROCHLORIDE 5 MG: 5 TABLET ORAL at 20:52

## 2024-01-01 RX ADMIN — FUROSEMIDE 40 MG: 10 INJECTION, SOLUTION INTRAMUSCULAR; INTRAVENOUS at 09:56

## 2024-01-01 RX ADMIN — NYSTATIN 500000 UNITS: 100000 SUSPENSION ORAL at 18:16

## 2024-01-01 RX ADMIN — LIDOCAINE 1 PATCH: 4 PATCH TOPICAL at 08:32

## 2024-01-01 RX ADMIN — CALCIUM GLUCONATE 1 G: 20 INJECTION, SOLUTION INTRAVENOUS at 03:27

## 2024-01-01 RX ADMIN — CALCIUM 1250 MG: 500 TABLET ORAL at 08:34

## 2024-01-01 RX ADMIN — IMMUNE GLOBULIN INFUSION (HUMAN) 10 G: 100 INJECTION, SOLUTION INTRAVENOUS; SUBCUTANEOUS at 12:58

## 2024-01-01 RX ADMIN — INSULIN LISPRO 4 UNITS: 100 INJECTION, SOLUTION INTRAVENOUS; SUBCUTANEOUS at 16:00

## 2024-01-01 RX ADMIN — PANTOPRAZOLE SODIUM 40 MG: 40 TABLET, DELAYED RELEASE ORAL at 08:44

## 2024-01-01 RX ADMIN — PIPERACILLIN SODIUM AND TAZOBACTAM SODIUM 4.5 G: 4; .5 INJECTION, SOLUTION INTRAVENOUS at 10:00

## 2024-01-01 RX ADMIN — HYDROMORPHONE HYDROCHLORIDE 0.2 MG: 1 INJECTION, SOLUTION INTRAMUSCULAR; INTRAVENOUS; SUBCUTANEOUS at 04:26

## 2024-01-01 RX ADMIN — CALCIUM CHLORIDE, MAGNESIUM CHLORIDE, DEXTROSE MONOHYDRATE, LACTIC ACID, SODIUM CHLORIDE, SODIUM BICARBONATE AND POTASSIUM CHLORIDE 2400 ML/HR: 3.68; 3.05; 22; 5.4; 6.46; 3.09; .314 INJECTION INTRAVENOUS at 16:51

## 2024-01-01 RX ADMIN — HYDRALAZINE HYDROCHLORIDE 100 MG: 100 TABLET, FILM COATED ORAL at 08:14

## 2024-01-01 RX ADMIN — HEPARIN SODIUM 5000 UNITS: 5000 INJECTION INTRAVENOUS; SUBCUTANEOUS at 18:01

## 2024-01-01 RX ADMIN — DIPHENHYDRAMINE HYDROCHLORIDE 25 MG: 25 CAPSULE ORAL at 12:11

## 2024-01-01 RX ADMIN — HYDROMORPHONE HYDROCHLORIDE 0.2 MG: 1 INJECTION, SOLUTION INTRAMUSCULAR; INTRAVENOUS; SUBCUTANEOUS at 23:09

## 2024-01-01 RX ADMIN — LEVOTHYROXINE SODIUM 200 MCG: 0.2 TABLET ORAL at 05:04

## 2024-01-01 RX ADMIN — SENNOSIDES AND DOCUSATE SODIUM 2 TABLET: 8.6; 5 TABLET ORAL at 09:00

## 2024-01-01 RX ADMIN — TACROLIMUS 1.5 MG: 1 CAPSULE ORAL at 18:33

## 2024-01-01 RX ADMIN — POTASSIUM CHLORIDE 40 MEQ: 29.8 INJECTION, SOLUTION INTRAVENOUS at 17:34

## 2024-01-01 RX ADMIN — TRAZODONE HYDROCHLORIDE 50 MG: 50 TABLET ORAL at 20:21

## 2024-01-01 RX ADMIN — ONDANSETRON 4 MG: 2 INJECTION INTRAMUSCULAR; INTRAVENOUS at 09:46

## 2024-01-01 RX ADMIN — INSULIN HUMAN 12 UNITS: 100 INJECTION, SUSPENSION SUBCUTANEOUS at 10:00

## 2024-01-01 RX ADMIN — HYDROMORPHONE HYDROCHLORIDE 0.2 MG: 1 INJECTION, SOLUTION INTRAMUSCULAR; INTRAVENOUS; SUBCUTANEOUS at 07:55

## 2024-01-01 RX ADMIN — HYDRALAZINE HYDROCHLORIDE 10 MG: 20 INJECTION INTRAMUSCULAR; INTRAVENOUS at 14:21

## 2024-01-01 RX ADMIN — PROPOFOL 40 MCG/KG/MIN: 10 INJECTION, EMULSION INTRAVENOUS at 01:56

## 2024-01-01 RX ADMIN — DEXTROSE MONOHYDRATE 125 ML/HR: 50 INJECTION, SOLUTION INTRAVENOUS at 14:11

## 2024-01-01 RX ADMIN — INSULIN LISPRO 4 UNITS: 100 INJECTION, SOLUTION INTRAVENOUS; SUBCUTANEOUS at 16:29

## 2024-01-01 RX ADMIN — Medication 2.5 MG: at 18:05

## 2024-01-01 RX ADMIN — EPINEPHRINE 0.02 MCG/KG/MIN: 1 INJECTION INTRAMUSCULAR; INTRAVENOUS; SUBCUTANEOUS at 13:19

## 2024-01-01 RX ADMIN — SODIUM NITROPRUSSIDE 1.4 MCG/KG/MIN: 0.5 INJECTION, SOLUTION INTRAVENOUS at 03:20

## 2024-01-01 RX ADMIN — FERROUS SULFATE TAB 325 MG (65 MG ELEMENTAL FE) 1 TABLET: 325 (65 FE) TAB at 09:10

## 2024-01-01 RX ADMIN — Medication 10 MG: at 20:07

## 2024-01-01 RX ADMIN — OXYCODONE HYDROCHLORIDE 10 MG: 5 TABLET ORAL at 20:24

## 2024-01-01 RX ADMIN — INSULIN LISPRO 2 UNITS: 100 INJECTION, SOLUTION INTRAVENOUS; SUBCUTANEOUS at 17:39

## 2024-01-01 RX ADMIN — ESOMEPRAZOLE MAGNESIUM 40 MG: 40 FOR SUSPENSION ORAL at 10:39

## 2024-01-01 RX ADMIN — Medication 1.5 MG: at 15:30

## 2024-01-01 RX ADMIN — FOLIC ACID 1 MG: 1 TABLET ORAL at 09:25

## 2024-01-01 RX ADMIN — Medication 2 G: at 09:29

## 2024-01-01 RX ADMIN — VALGANCICLOVIR HYDROCHLORIDE 450 MG: 450 TABLET ORAL at 14:56

## 2024-01-01 RX ADMIN — HYDROXYZINE HYDROCHLORIDE 25 MG: 25 TABLET, FILM COATED ORAL at 16:14

## 2024-01-01 RX ADMIN — INSULIN LISPRO 2 UNITS: 100 INJECTION, SOLUTION INTRAVENOUS; SUBCUTANEOUS at 12:30

## 2024-01-01 RX ADMIN — MAGNESIUM SULFATE HEPTAHYDRATE 1 G: 1 INJECTION, SOLUTION INTRAVENOUS at 06:59

## 2024-01-01 RX ADMIN — HEPARIN SODIUM 10000 UNITS: 1000 INJECTION INTRAVENOUS; SUBCUTANEOUS at 05:08

## 2024-01-01 RX ADMIN — ISOSORBIDE DINITRATE 40 MG: 10 TABLET ORAL at 07:40

## 2024-01-01 RX ADMIN — FENTANYL CITRATE 50 MCG: 50 INJECTION INTRAMUSCULAR; INTRAVENOUS at 18:26

## 2024-01-01 RX ADMIN — PROPOFOL 30 MCG/KG/MIN: 10 INJECTION, EMULSION INTRAVENOUS at 20:06

## 2024-01-01 RX ADMIN — DEXMEDETOMIDINE HYDROCHLORIDE 0.8 MCG/KG/HR: 400 INJECTION INTRAVENOUS at 15:19

## 2024-01-01 RX ADMIN — MYCOPHENOLIC ACID 360 MG: 360 TABLET, DELAYED RELEASE ORAL at 06:15

## 2024-01-01 RX ADMIN — NYSTATIN 500000 UNITS: 100000 SUSPENSION ORAL at 13:37

## 2024-01-01 RX ADMIN — CEPHALEXIN 500 MG: 250 FOR SUSPENSION ORAL at 20:33

## 2024-01-01 RX ADMIN — TACROLIMUS 2 MG: 1 CAPSULE ORAL at 06:29

## 2024-01-01 RX ADMIN — PREDNISONE 10 MG: 10 TABLET ORAL at 09:43

## 2024-01-01 RX ADMIN — ISOSORBIDE DINITRATE 40 MG: 10 TABLET ORAL at 08:45

## 2024-01-01 RX ADMIN — NYSTATIN 500000 UNITS: 100000 SUSPENSION ORAL at 18:03

## 2024-01-01 RX ADMIN — CALCIUM GLUCONATE 2 G: 20 INJECTION, SOLUTION INTRAVENOUS at 17:49

## 2024-01-01 RX ADMIN — HEPARIN SODIUM 5000 UNITS: 5000 INJECTION INTRAVENOUS; SUBCUTANEOUS at 01:19

## 2024-01-01 RX ADMIN — OXYCODONE HYDROCHLORIDE 5 MG: 5 TABLET ORAL at 20:23

## 2024-01-01 RX ADMIN — POTASSIUM & SODIUM PHOSPHATES POWDER PACK 280-160-250 MG 1 PACKET: 280-160-250 PACK at 12:14

## 2024-01-01 RX ADMIN — ISOSORBIDE DINITRATE 40 MG: 10 TABLET ORAL at 08:23

## 2024-01-01 RX ADMIN — HYDROMORPHONE HYDROCHLORIDE 0.2 MG: 1 INJECTION, SOLUTION INTRAMUSCULAR; INTRAVENOUS; SUBCUTANEOUS at 13:26

## 2024-01-01 RX ADMIN — DIPHENHYDRAMINE HYDROCHLORIDE 25 MG: 50 INJECTION INTRAMUSCULAR; INTRAVENOUS at 19:18

## 2024-01-01 RX ADMIN — MILRINONE LACTATE 0.25 MCG/KG/MIN: 200 INJECTION, SOLUTION INTRAVENOUS at 04:16

## 2024-01-01 RX ADMIN — ACETAMINOPHEN 650 MG: 325 TABLET ORAL at 04:02

## 2024-01-01 RX ADMIN — ACETAMINOPHEN 650 MG: 325 TABLET ORAL at 15:41

## 2024-01-01 RX ADMIN — LEVOTHYROXINE SODIUM 200 MCG: 0.2 TABLET ORAL at 04:21

## 2024-01-01 RX ADMIN — ACETAMINOPHEN 650 MG: 325 TABLET ORAL at 16:12

## 2024-01-01 RX ADMIN — HYDRALAZINE HYDROCHLORIDE 25 MG: 25 TABLET ORAL at 08:56

## 2024-01-01 RX ADMIN — HYDRALAZINE HYDROCHLORIDE 100 MG: 100 TABLET, FILM COATED ORAL at 00:52

## 2024-01-01 RX ADMIN — Medication 10 MG: at 20:22

## 2024-01-01 RX ADMIN — IPRATROPIUM BROMIDE AND ALBUTEROL SULFATE 3 ML: .5; 3 SOLUTION RESPIRATORY (INHALATION) at 17:30

## 2024-01-01 RX ADMIN — FERROUS SULFATE TAB 325 MG (65 MG ELEMENTAL FE) 1 TABLET: 325 (65 FE) TAB at 08:20

## 2024-01-01 RX ADMIN — Medication 8 MG/HR: at 03:21

## 2024-01-01 RX ADMIN — TACROLIMUS 4 MG: 1 CAPSULE ORAL at 06:09

## 2024-01-01 RX ADMIN — POLYETHYLENE GLYCOL 3350 17 G: 17 POWDER, FOR SOLUTION ORAL at 08:21

## 2024-01-01 RX ADMIN — CALCIUM CHLORIDE, MAGNESIUM CHLORIDE, DEXTROSE MONOHYDRATE, LACTIC ACID, SODIUM CHLORIDE, SODIUM BICARBONATE AND POTASSIUM CHLORIDE 1800 ML/HR: 3.68; 3.05; 22; 5.4; 6.46; 3.09; .314 INJECTION INTRAVENOUS at 16:30

## 2024-01-01 RX ADMIN — FERROUS SULFATE TAB 325 MG (65 MG ELEMENTAL FE) 1 TABLET: 325 (65 FE) TAB at 08:45

## 2024-01-01 RX ADMIN — TACROLIMUS 3.5 MG: 1 CAPSULE ORAL at 06:43

## 2024-01-01 RX ADMIN — PANTOPRAZOLE SODIUM 40 MG: 40 TABLET, DELAYED RELEASE ORAL at 06:33

## 2024-01-01 RX ADMIN — HYDROMORPHONE HYDROCHLORIDE 0.2 MG: 1 INJECTION, SOLUTION INTRAMUSCULAR; INTRAVENOUS; SUBCUTANEOUS at 08:49

## 2024-01-01 RX ADMIN — DIBASIC SODIUM PHOSPHATE, MONOBASIC POTASSIUM PHOSPHATE AND MONOBASIC SODIUM PHOSPHATE 500 MG: 852; 155; 130 TABLET ORAL at 13:00

## 2024-01-01 RX ADMIN — INSULIN LISPRO 2 UNITS: 100 INJECTION, SOLUTION INTRAVENOUS; SUBCUTANEOUS at 12:37

## 2024-01-01 RX ADMIN — METOLAZONE 5 MG: 5 TABLET ORAL at 08:27

## 2024-01-01 RX ADMIN — DIBASIC SODIUM PHOSPHATE, MONOBASIC POTASSIUM PHOSPHATE AND MONOBASIC SODIUM PHOSPHATE 500 MG: 852; 155; 130 TABLET ORAL at 16:44

## 2024-01-01 RX ADMIN — HEPARIN SODIUM 5000 UNITS: 5000 INJECTION INTRAVENOUS; SUBCUTANEOUS at 10:52

## 2024-01-01 RX ADMIN — ONDANSETRON 4 MG: 2 INJECTION INTRAMUSCULAR; INTRAVENOUS at 12:36

## 2024-01-01 RX ADMIN — TACROLIMUS 3 MG: 1 CAPSULE ORAL at 18:23

## 2024-01-01 RX ADMIN — HYDRALAZINE HYDROCHLORIDE 50 MG: 50 TABLET ORAL at 09:35

## 2024-01-01 RX ADMIN — CALCIUM CARBONATE (ANTACID) CHEW TAB 500 MG 1500 MG: 500 CHEW TAB at 08:24

## 2024-01-01 RX ADMIN — Medication 32 MCG: at 16:18

## 2024-01-01 RX ADMIN — NYSTATIN 500000 UNITS: 100000 SUSPENSION ORAL at 20:40

## 2024-01-01 RX ADMIN — POTASSIUM CHLORIDE 40 MEQ: 29.8 INJECTION, SOLUTION INTRAVENOUS at 18:52

## 2024-01-01 RX ADMIN — OXYCODONE HYDROCHLORIDE 5 MG: 5 TABLET ORAL at 03:06

## 2024-01-01 RX ADMIN — HEPARIN SODIUM 10 ML/HR: 1000 INJECTION INTRAVENOUS; SUBCUTANEOUS at 04:02

## 2024-01-01 RX ADMIN — Medication 8 MG/HR: at 16:24

## 2024-01-01 RX ADMIN — HYDROMORPHONE HYDROCHLORIDE 1 MG: 2 TABLET ORAL at 17:00

## 2024-01-01 RX ADMIN — Medication 1 TABLET: at 08:34

## 2024-01-01 RX ADMIN — HEPARIN SODIUM 5000 UNITS: 5000 INJECTION INTRAVENOUS; SUBCUTANEOUS at 15:20

## 2024-01-01 RX ADMIN — NYSTATIN 500000 UNITS: 100000 SUSPENSION ORAL at 19:02

## 2024-01-01 RX ADMIN — ONDANSETRON 4 MG: 2 INJECTION INTRAMUSCULAR; INTRAVENOUS at 19:50

## 2024-01-01 RX ADMIN — ACETAMINOPHEN 650 MG: 650 SOLUTION ORAL at 16:27

## 2024-01-01 RX ADMIN — Medication 30 PERCENT: at 07:44

## 2024-01-01 RX ADMIN — HYDROMORPHONE HYDROCHLORIDE 0.2 MG: 1 INJECTION, SOLUTION INTRAMUSCULAR; INTRAVENOUS; SUBCUTANEOUS at 01:09

## 2024-01-01 RX ADMIN — ALBUMIN HUMAN 25 G: 0.25 SOLUTION INTRAVENOUS at 18:57

## 2024-01-01 RX ADMIN — FUROSEMIDE 40 MG: 10 INJECTION, SOLUTION INTRAMUSCULAR; INTRAVENOUS at 19:39

## 2024-01-01 RX ADMIN — CALCIUM CHLORIDE, MAGNESIUM CHLORIDE, DEXTROSE MONOHYDRATE, LACTIC ACID, SODIUM CHLORIDE, SODIUM BICARBONATE AND POTASSIUM CHLORIDE 2400 ML/HR: 3.68; 3.05; 22; 5.4; 6.46; 3.09; .314 INJECTION INTRAVENOUS at 14:52

## 2024-01-01 RX ADMIN — NYSTATIN 500000 UNITS: 100000 SUSPENSION ORAL at 00:40

## 2024-01-01 RX ADMIN — PROPOFOL 50 MCG/KG/MIN: 10 INJECTION, EMULSION INTRAVENOUS at 09:55

## 2024-01-01 RX ADMIN — MILRINONE LACTATE 0.25 MCG/KG/MIN: 200 INJECTION, SOLUTION INTRAVENOUS at 14:00

## 2024-01-01 RX ADMIN — HYDROXYZINE HYDROCHLORIDE 25 MG: 25 TABLET, FILM COATED ORAL at 18:28

## 2024-01-01 RX ADMIN — FENTANYL CITRATE 250 MCG: 50 INJECTION, SOLUTION INTRAMUSCULAR; INTRAVENOUS at 17:19

## 2024-01-01 RX ADMIN — OXYCODONE HYDROCHLORIDE 5 MG: 5 TABLET ORAL at 11:42

## 2024-01-01 RX ADMIN — MYCOPHENOLIC ACID 360 MG: 360 TABLET, DELAYED RELEASE ORAL at 18:22

## 2024-01-01 RX ADMIN — CALCIUM CHLORIDE, MAGNESIUM CHLORIDE, DEXTROSE MONOHYDRATE, LACTIC ACID, SODIUM CHLORIDE, SODIUM BICARBONATE AND POTASSIUM CHLORIDE 2400 ML/HR: 3.68; 3.05; 22; 5.4; 6.46; 3.09; .314 INJECTION INTRAVENOUS at 16:52

## 2024-01-01 RX ADMIN — POLYETHYLENE GLYCOL 3350 17 G: 17 POWDER, FOR SOLUTION ORAL at 12:19

## 2024-01-01 RX ADMIN — PREDNISONE 20 MG: 20 TABLET ORAL at 09:52

## 2024-01-01 RX ADMIN — INSULIN LISPRO 1 UNITS: 100 INJECTION, SOLUTION INTRAVENOUS; SUBCUTANEOUS at 08:57

## 2024-01-01 RX ADMIN — NYSTATIN 500000 UNITS: 100000 SUSPENSION ORAL at 18:47

## 2024-01-01 RX ADMIN — CALCITRIOL 0.5 MCG: 1 SOLUTION ORAL at 08:41

## 2024-01-01 RX ADMIN — CALCIUM CHLORIDE, MAGNESIUM CHLORIDE, DEXTROSE MONOHYDRATE, LACTIC ACID, SODIUM CHLORIDE, SODIUM BICARBONATE AND POTASSIUM CHLORIDE 2400 ML/HR: 3.68; 3.05; 22; 5.4; 6.46; 3.09; .314 INJECTION INTRAVENOUS at 12:42

## 2024-01-01 RX ADMIN — MYCOPHENOLIC ACID 360 MG: 360 TABLET, DELAYED RELEASE ORAL at 06:26

## 2024-01-01 RX ADMIN — POTASSIUM PHOSPHATE, MONOBASIC AND POTASSIUM PHOSPHATE, DIBASIC 21 MMOL: 224; 236 INJECTION, SOLUTION, CONCENTRATE INTRAVENOUS at 10:07

## 2024-01-01 RX ADMIN — MILRINONE LACTATE 0.38 MCG/KG/MIN: 200 INJECTION, SOLUTION INTRAVENOUS at 04:33

## 2024-01-01 RX ADMIN — LIDOCAINE 1 PATCH: 4 PATCH TOPICAL at 08:01

## 2024-01-01 RX ADMIN — TRAZODONE HYDROCHLORIDE 50 MG: 50 TABLET ORAL at 21:04

## 2024-01-01 RX ADMIN — INSULIN LISPRO 5 UNITS: 100 INJECTION, SOLUTION INTRAVENOUS; SUBCUTANEOUS at 12:33

## 2024-01-01 RX ADMIN — Medication 2 MG: at 06:30

## 2024-01-01 RX ADMIN — NYSTATIN 500000 UNITS: 100000 SUSPENSION ORAL at 00:38

## 2024-01-01 RX ADMIN — PIPERACILLIN SODIUM AND TAZOBACTAM SODIUM 3.38 G: 3; .375 INJECTION, SOLUTION INTRAVENOUS at 17:55

## 2024-01-01 RX ADMIN — HYDRALAZINE HYDROCHLORIDE 25 MG: 25 TABLET ORAL at 10:40

## 2024-01-01 RX ADMIN — OXYCODONE HYDROCHLORIDE 5 MG: 5 TABLET ORAL at 19:13

## 2024-01-01 RX ADMIN — MILRINONE LACTATE 0.25 MCG/KG/MIN: 200 INJECTION, SOLUTION INTRAVENOUS at 08:33

## 2024-01-01 RX ADMIN — POTASSIUM CHLORIDE 20 MEQ: 14.9 INJECTION, SOLUTION INTRAVENOUS at 10:22

## 2024-01-01 RX ADMIN — SODIUM BICARBONATE 10 ML/HR: 84 INJECTION, SOLUTION INTRAVENOUS at 14:30

## 2024-01-01 RX ADMIN — OXYMETAZOLINE HYDROCHLORIDE 2 SPRAY: 0.05 SPRAY NASAL at 02:58

## 2024-01-01 RX ADMIN — PREDNISONE 10 MG: 10 TABLET ORAL at 09:32

## 2024-01-01 RX ADMIN — CHLOROTHIAZIDE SODIUM 500 MG: 500 INJECTION, POWDER, LYOPHILIZED, FOR SOLUTION INTRAVENOUS at 16:27

## 2024-01-01 RX ADMIN — OXYCODONE HYDROCHLORIDE 10 MG: 5 TABLET ORAL at 10:32

## 2024-01-01 RX ADMIN — CALCIUM CHLORIDE, MAGNESIUM CHLORIDE, DEXTROSE MONOHYDRATE, LACTIC ACID, SODIUM CHLORIDE, SODIUM BICARBONATE AND POTASSIUM CHLORIDE 1800 ML/HR: 3.68; 3.05; 22; 5.4; 6.46; 3.09; .314 INJECTION INTRAVENOUS at 16:23

## 2024-01-01 RX ADMIN — PREDNISONE 10 MG: 10 TABLET ORAL at 08:24

## 2024-01-01 RX ADMIN — IMMUNE GLOBULIN INFUSION (HUMAN) 100 G: 100 INJECTION, SOLUTION INTRAVENOUS; SUBCUTANEOUS at 17:52

## 2024-01-01 RX ADMIN — CALCIUM CHLORIDE, MAGNESIUM CHLORIDE, DEXTROSE MONOHYDRATE, LACTIC ACID, SODIUM CHLORIDE, SODIUM BICARBONATE AND POTASSIUM CHLORIDE 2400 ML/HR: 3.68; 3.05; 22; 5.4; 6.46; 3.09; .314 INJECTION INTRAVENOUS at 22:14

## 2024-01-01 RX ADMIN — CALCIUM 1250 MG: 500 TABLET ORAL at 09:59

## 2024-01-01 RX ADMIN — LEVOTHYROXINE SODIUM ANHYDROUS 90 MCG: 100 INJECTION, POWDER, LYOPHILIZED, FOR SOLUTION INTRAVENOUS at 09:13

## 2024-01-01 RX ADMIN — CALCIUM 1250 MG: 500 TABLET ORAL at 11:54

## 2024-01-01 RX ADMIN — VANCOMYCIN HYDROCHLORIDE 1000 MG: 1 INJECTION, SOLUTION INTRAVENOUS at 20:46

## 2024-01-01 RX ADMIN — Medication 10 MG: at 21:17

## 2024-01-01 RX ADMIN — PIPERACILLIN SODIUM AND TAZOBACTAM SODIUM 3.38 G: 3; .375 INJECTION, SOLUTION INTRAVENOUS at 15:46

## 2024-01-01 RX ADMIN — FOLIC ACID 1 MG: 1 TABLET ORAL at 08:24

## 2024-01-01 RX ADMIN — TACROLIMUS 1.5 MG: 1 CAPSULE ORAL at 18:40

## 2024-01-01 RX ADMIN — FUROSEMIDE 40 MG: 10 INJECTION, SOLUTION INTRAMUSCULAR; INTRAVENOUS at 10:34

## 2024-01-01 RX ADMIN — PANTOPRAZOLE SODIUM 40 MG: 40 INJECTION, POWDER, FOR SOLUTION INTRAVENOUS at 08:10

## 2024-01-01 RX ADMIN — HEPARIN SODIUM 5000 UNITS: 5000 INJECTION INTRAVENOUS; SUBCUTANEOUS at 16:37

## 2024-01-01 RX ADMIN — TACROLIMUS 1.5 MG: 1 CAPSULE ORAL at 18:25

## 2024-01-01 RX ADMIN — INSULIN LISPRO 3 UNITS: 100 INJECTION, SOLUTION INTRAVENOUS; SUBCUTANEOUS at 13:05

## 2024-01-01 RX ADMIN — POLYETHYLENE GLYCOL 3350 17 G: 17 POWDER, FOR SOLUTION ORAL at 21:30

## 2024-01-01 RX ADMIN — THIAMINE HYDROCHLORIDE 500 MG: 100 INJECTION, SOLUTION INTRAMUSCULAR; INTRAVENOUS at 20:05

## 2024-01-01 RX ADMIN — NYSTATIN 500000 UNITS: 100000 SUSPENSION ORAL at 13:15

## 2024-01-01 RX ADMIN — INSULIN HUMAN 3 UNITS: 100 INJECTION, SOLUTION PARENTERAL at 18:12

## 2024-01-01 RX ADMIN — VANCOMYCIN HYDROCHLORIDE 500 MG: 500 INJECTION, SOLUTION INTRAVENOUS at 05:14

## 2024-01-01 RX ADMIN — NYSTATIN 500000 UNITS: 100000 SUSPENSION ORAL at 06:15

## 2024-01-01 RX ADMIN — HYDROXYZINE HYDROCHLORIDE 25 MG: 25 TABLET, FILM COATED ORAL at 21:27

## 2024-01-01 RX ADMIN — POLYETHYLENE GLYCOL 3350 17 G: 17 POWDER, FOR SOLUTION ORAL at 09:18

## 2024-01-01 RX ADMIN — HYDROMORPHONE HYDROCHLORIDE 0.2 MG: 1 INJECTION, SOLUTION INTRAMUSCULAR; INTRAVENOUS; SUBCUTANEOUS at 20:09

## 2024-01-01 RX ADMIN — NYSTATIN 500000 UNITS: 100000 SUSPENSION ORAL at 12:21

## 2024-01-01 RX ADMIN — SODIUM PHOSPHATE, MONOBASIC, MONOHYDRATE AND SODIUM PHOSPHATE, DIBASIC, ANHYDROUS 21 MMOL: 142; 276 INJECTION, SOLUTION INTRAVENOUS at 06:26

## 2024-01-01 RX ADMIN — SENNOSIDES AND DOCUSATE SODIUM 2 TABLET: 8.6; 5 TABLET ORAL at 20:35

## 2024-01-01 RX ADMIN — FERROUS SULFATE TAB 325 MG (65 MG ELEMENTAL FE) 1 TABLET: 325 (65 FE) TAB at 08:41

## 2024-01-01 RX ADMIN — Medication 800 MG: at 08:34

## 2024-01-01 RX ADMIN — OXYCODONE HYDROCHLORIDE 5 MG: 5 TABLET ORAL at 12:55

## 2024-01-01 RX ADMIN — PANTOPRAZOLE SODIUM 40 MG: 40 TABLET, DELAYED RELEASE ORAL at 08:48

## 2024-01-01 RX ADMIN — INSULIN LISPRO 2 UNITS: 100 INJECTION, SOLUTION INTRAVENOUS; SUBCUTANEOUS at 11:51

## 2024-01-01 RX ADMIN — DOBUTAMINE HYDROCHLORIDE 5 MCG/KG/MIN: 400 INJECTION INTRAVENOUS at 21:45

## 2024-01-01 RX ADMIN — Medication 2 G: at 10:02

## 2024-01-01 RX ADMIN — NYSTATIN 500000 UNITS: 100000 SUSPENSION ORAL at 18:12

## 2024-01-01 RX ADMIN — Medication 1 TABLET: at 09:35

## 2024-01-01 RX ADMIN — HYDROMORPHONE HYDROCHLORIDE 0.2 MG: 1 INJECTION, SOLUTION INTRAMUSCULAR; INTRAVENOUS; SUBCUTANEOUS at 17:40

## 2024-01-01 RX ADMIN — FENTANYL CITRATE 100 MCG: 50 INJECTION, SOLUTION INTRAMUSCULAR; INTRAVENOUS at 20:58

## 2024-01-01 RX ADMIN — CYANOCOBALAMIN TAB 1000 MCG 500 MCG: 1000 TAB at 08:00

## 2024-01-01 RX ADMIN — PANTOPRAZOLE SODIUM 40 MG: 40 TABLET, DELAYED RELEASE ORAL at 06:05

## 2024-01-01 RX ADMIN — CALCIUM GLUCONATE 1 G: 20 INJECTION, SOLUTION INTRAVENOUS at 03:21

## 2024-01-01 RX ADMIN — VANCOMYCIN HYDROCHLORIDE 750 MG: 750 INJECTION, SOLUTION INTRAVENOUS at 20:39

## 2024-01-01 RX ADMIN — HEPARIN SODIUM 5000 UNITS: 5000 INJECTION INTRAVENOUS; SUBCUTANEOUS at 08:40

## 2024-01-01 RX ADMIN — SODIUM NITROPRUSSIDE 1 MCG/KG/MIN: 0.5 INJECTION, SOLUTION INTRAVENOUS at 10:00

## 2024-01-01 RX ADMIN — FOLIC ACID 1 MG: 1 TABLET ORAL at 08:17

## 2024-01-01 RX ADMIN — MEROPENEM 1 G: 1 INJECTION, POWDER, FOR SOLUTION INTRAVENOUS at 16:58

## 2024-01-01 RX ADMIN — ONDANSETRON 4 MG: 2 INJECTION INTRAMUSCULAR; INTRAVENOUS at 04:20

## 2024-01-01 RX ADMIN — DEXMEDETOMIDINE HYDROCHLORIDE 0.2 MCG/KG/HR: 400 INJECTION INTRAVENOUS at 08:53

## 2024-01-01 RX ADMIN — OXYCODONE HYDROCHLORIDE 5 MG: 5 TABLET ORAL at 18:08

## 2024-01-01 RX ADMIN — VALGANCICLOVIR HYDROCHLORIDE 450 MG: 450 TABLET ORAL at 09:13

## 2024-01-01 RX ADMIN — Medication 40 PERCENT: at 08:20

## 2024-01-01 RX ADMIN — LEVOTHYROXINE SODIUM 200 MCG: 200 TABLET ORAL at 10:00

## 2024-01-01 RX ADMIN — PANTOPRAZOLE SODIUM 40 MG: 40 INJECTION, POWDER, FOR SOLUTION INTRAVENOUS at 09:08

## 2024-01-01 RX ADMIN — Medication 10 MG: at 18:50

## 2024-01-01 RX ADMIN — Medication 5 MG: at 20:05

## 2024-01-01 RX ADMIN — PERFLUTREN 10 ML OF DILUTION: 6.52 INJECTION, SUSPENSION INTRAVENOUS at 14:50

## 2024-01-01 RX ADMIN — HYDRALAZINE HYDROCHLORIDE 10 MG: 20 INJECTION INTRAMUSCULAR; INTRAVENOUS at 09:41

## 2024-01-01 RX ADMIN — QUETIAPINE 50 MG: 25 TABLET ORAL at 21:10

## 2024-01-01 RX ADMIN — ACETAMINOPHEN 650 MG: 325 TABLET ORAL at 20:00

## 2024-01-01 RX ADMIN — Medication 2 G: at 21:10

## 2024-01-01 RX ADMIN — CALCITRIOL 0.5 MCG: 1 SOLUTION ORAL at 08:24

## 2024-01-01 RX ADMIN — DIBASIC SODIUM PHOSPHATE, MONOBASIC POTASSIUM PHOSPHATE AND MONOBASIC SODIUM PHOSPHATE 500 MG: 852; 155; 130 TABLET ORAL at 07:47

## 2024-01-01 RX ADMIN — INSULIN LISPRO 2 UNITS: 100 INJECTION, SOLUTION INTRAVENOUS; SUBCUTANEOUS at 17:38

## 2024-01-01 RX ADMIN — DEXMEDETOMIDINE HYDROCHLORIDE 0.5 MCG/KG/HR: 400 INJECTION INTRAVENOUS at 06:03

## 2024-01-01 RX ADMIN — HYDROMORPHONE HYDROCHLORIDE 0.2 MG: 1 INJECTION, SOLUTION INTRAMUSCULAR; INTRAVENOUS; SUBCUTANEOUS at 20:23

## 2024-01-01 RX ADMIN — Medication 1 TABLET: at 09:43

## 2024-01-01 RX ADMIN — CALCIUM CHLORIDE, MAGNESIUM CHLORIDE, DEXTROSE MONOHYDRATE, LACTIC ACID, SODIUM CHLORIDE, SODIUM BICARBONATE AND POTASSIUM CHLORIDE 2400 ML/HR: 3.68; 3.05; 22; 5.4; 6.46; 3.09; .314 INJECTION INTRAVENOUS at 10:09

## 2024-01-01 RX ADMIN — NYSTATIN 500000 UNITS: 100000 SUSPENSION ORAL at 17:40

## 2024-01-01 RX ADMIN — SENNOSIDES AND DOCUSATE SODIUM 1 TABLET: 8.6; 5 TABLET ORAL at 20:05

## 2024-01-01 RX ADMIN — HYDRALAZINE HYDROCHLORIDE 100 MG: 100 TABLET, FILM COATED ORAL at 15:27

## 2024-01-01 RX ADMIN — ACETAMINOPHEN 975 MG: 325 TABLET ORAL at 08:02

## 2024-01-01 RX ADMIN — TACROLIMUS 4 MG: 1 CAPSULE ORAL at 18:21

## 2024-01-01 RX ADMIN — PROPOFOL 50 MCG/KG/MIN: 10 INJECTION, EMULSION INTRAVENOUS at 03:08

## 2024-01-01 RX ADMIN — DEXMEDETOMIDINE HYDROCHLORIDE 1.5 MCG/KG/HR: 400 INJECTION INTRAVENOUS at 20:34

## 2024-01-01 RX ADMIN — LEVOTHYROXINE SODIUM 200 MCG: 0.2 TABLET ORAL at 04:59

## 2024-01-01 RX ADMIN — PROPOFOL 20 MG: 10 INJECTION, EMULSION INTRAVENOUS at 12:11

## 2024-01-01 RX ADMIN — CALCIUM GLUCONATE 2 G: 20 INJECTION, SOLUTION INTRAVENOUS at 10:01

## 2024-01-01 RX ADMIN — Medication 1 TABLET: at 09:00

## 2024-01-01 RX ADMIN — HYDROMORPHONE HYDROCHLORIDE 0.4 MG: 1 INJECTION, SOLUTION INTRAMUSCULAR; INTRAVENOUS; SUBCUTANEOUS at 22:22

## 2024-01-01 RX ADMIN — SODIUM CHLORIDE, SODIUM LACTATE, POTASSIUM CHLORIDE, AND CALCIUM CHLORIDE: 600; 310; 30; 20 INJECTION, SOLUTION INTRAVENOUS at 18:55

## 2024-01-01 RX ADMIN — ONDANSETRON 4 MG: 2 INJECTION INTRAMUSCULAR; INTRAVENOUS at 19:14

## 2024-01-01 RX ADMIN — FENTANYL CITRATE 150 MCG: 50 INJECTION, SOLUTION INTRAMUSCULAR; INTRAVENOUS at 20:24

## 2024-01-01 RX ADMIN — ACETAMINOPHEN 650 MG: 325 TABLET ORAL at 20:06

## 2024-01-01 RX ADMIN — ALBUMIN HUMAN 25 G: 0.25 SOLUTION INTRAVENOUS at 09:12

## 2024-01-01 RX ADMIN — HEPARIN SODIUM 5000 UNITS: 1000 INJECTION INTRAVENOUS; SUBCUTANEOUS at 19:34

## 2024-01-01 RX ADMIN — VALGANCICLOVIR HYDROCHLORIDE 450 MG: 450 TABLET ORAL at 14:26

## 2024-01-01 RX ADMIN — ACETAMINOPHEN 650 MG: 325 TABLET ORAL at 10:52

## 2024-01-01 RX ADMIN — PIPERACILLIN SODIUM AND TAZOBACTAM SODIUM 3.38 G: 3; .375 INJECTION, SOLUTION INTRAVENOUS at 12:19

## 2024-01-01 RX ADMIN — ROCURONIUM BROMIDE 40 MG: 10 INJECTION, SOLUTION INTRAVENOUS at 12:47

## 2024-01-01 RX ADMIN — VALGANCICLOVIR 450 MG: 50 FOR SOLUTION ORAL at 12:04

## 2024-01-01 RX ADMIN — DEXMEDETOMIDINE HYDROCHLORIDE 0.4 MCG/KG/HR: 400 INJECTION INTRAVENOUS at 13:52

## 2024-01-01 RX ADMIN — FERROUS SULFATE TAB 325 MG (65 MG ELEMENTAL FE) 1 TABLET: 325 (65 FE) TAB at 09:25

## 2024-01-01 RX ADMIN — INSULIN HUMAN 1 UNITS: 100 INJECTION, SOLUTION PARENTERAL at 01:45

## 2024-01-01 RX ADMIN — HYDRALAZINE HYDROCHLORIDE 100 MG: 100 TABLET, FILM COATED ORAL at 07:40

## 2024-01-01 RX ADMIN — Medication 10 MG: at 20:14

## 2024-01-01 RX ADMIN — EPINEPHRINE 0.02 MCG/KG/MIN: 1 INJECTION INTRAMUSCULAR; INTRAVENOUS; SUBCUTANEOUS at 08:30

## 2024-01-01 RX ADMIN — HYDROXYZINE HYDROCHLORIDE 25 MG: 25 TABLET, FILM COATED ORAL at 02:30

## 2024-01-01 RX ADMIN — ACETAMINOPHEN 975 MG: 325 TABLET ORAL at 20:23

## 2024-01-01 RX ADMIN — NYSTATIN 500000 UNITS: 100000 SUSPENSION ORAL at 14:08

## 2024-01-01 RX ADMIN — LORAZEPAM 0.5 MG: 2 INJECTION INTRAMUSCULAR at 02:40

## 2024-01-01 RX ADMIN — ESOMEPRAZOLE MAGNESIUM 40 MG: 40 FOR SUSPENSION ORAL at 11:56

## 2024-01-01 RX ADMIN — Medication 1 TABLET: at 07:40

## 2024-01-01 RX ADMIN — TACROLIMUS 1 MG: 1 CAPSULE ORAL at 18:28

## 2024-01-01 RX ADMIN — HEPARIN SODIUM 5000 UNITS: 5000 INJECTION INTRAVENOUS; SUBCUTANEOUS at 16:16

## 2024-01-01 RX ADMIN — LEVOTHYROXINE SODIUM 250 MCG: 0.12 TABLET ORAL at 03:56

## 2024-01-01 RX ADMIN — TACROLIMUS 1 MG: 1 CAPSULE ORAL at 06:31

## 2024-01-01 RX ADMIN — ACETAMINOPHEN 650 MG: 325 TABLET ORAL at 09:10

## 2024-01-01 RX ADMIN — ONDANSETRON 4 MG: 2 INJECTION INTRAMUSCULAR; INTRAVENOUS at 08:44

## 2024-01-01 RX ADMIN — LIDOCAINE HYDROCHLORIDE 2 MG: 20 INJECTION, SOLUTION INFILTRATION; PERINEURAL at 12:48

## 2024-01-01 RX ADMIN — ACETAMINOPHEN 650 MG: 325 TABLET ORAL at 03:10

## 2024-01-01 RX ADMIN — SODIUM BICARBONATE 10 ML/HR: 84 INJECTION, SOLUTION INTRAVENOUS at 03:56

## 2024-01-01 RX ADMIN — ALBUMIN HUMAN 25 G: 0.25 SOLUTION INTRAVENOUS at 01:42

## 2024-01-01 RX ADMIN — NYSTATIN 500000 UNITS: 100000 SUSPENSION ORAL at 06:38

## 2024-01-01 RX ADMIN — INSULIN LISPRO 2 UNITS: 100 INJECTION, SOLUTION INTRAVENOUS; SUBCUTANEOUS at 21:22

## 2024-01-01 RX ADMIN — TRAZODONE HYDROCHLORIDE 50 MG: 50 TABLET ORAL at 20:14

## 2024-01-01 RX ADMIN — NYSTATIN 500000 UNITS: 100000 SUSPENSION ORAL at 23:44

## 2024-01-01 RX ADMIN — FERROUS SULFATE TAB 325 MG (65 MG ELEMENTAL FE) 1 TABLET: 325 (65 FE) TAB at 09:02

## 2024-01-01 RX ADMIN — QUETIAPINE 50 MG: 25 TABLET ORAL at 09:47

## 2024-01-01 RX ADMIN — DIBASIC SODIUM PHOSPHATE, MONOBASIC POTASSIUM PHOSPHATE AND MONOBASIC SODIUM PHOSPHATE 500 MG: 852; 155; 130 TABLET ORAL at 22:09

## 2024-01-01 RX ADMIN — POLYETHYLENE GLYCOL 3350 17 G: 17 POWDER, FOR SOLUTION ORAL at 08:41

## 2024-01-01 RX ADMIN — HYDROMORPHONE HYDROCHLORIDE 0.2 MG: 1 INJECTION, SOLUTION INTRAMUSCULAR; INTRAVENOUS; SUBCUTANEOUS at 10:47

## 2024-01-01 RX ADMIN — LEVOTHYROXINE SODIUM 200 MCG: 0.2 TABLET ORAL at 03:31

## 2024-01-01 RX ADMIN — CALCIUM CHLORIDE, MAGNESIUM CHLORIDE, DEXTROSE MONOHYDRATE, LACTIC ACID, SODIUM CHLORIDE, SODIUM BICARBONATE AND POTASSIUM CHLORIDE 2400 ML/HR: 3.68; 3.05; 22; 5.4; 6.46; 3.09; .314 INJECTION INTRAVENOUS at 12:38

## 2024-01-01 RX ADMIN — HYDROMORPHONE HYDROCHLORIDE 1 MG: 2 TABLET ORAL at 20:05

## 2024-01-01 RX ADMIN — OXYMETAZOLINE HYDROCHLORIDE 2 SPRAY: 0.05 SPRAY NASAL at 15:48

## 2024-01-01 RX ADMIN — PROPOFOL 30 MCG/KG/MIN: 10 INJECTION, EMULSION INTRAVENOUS at 05:33

## 2024-01-01 RX ADMIN — Medication 1 TABLET: at 12:16

## 2024-01-01 RX ADMIN — ACETAMINOPHEN 650 MG: 325 TABLET ORAL at 21:27

## 2024-01-01 RX ADMIN — ACETAMINOPHEN 650 MG: 325 TABLET ORAL at 16:14

## 2024-01-01 RX ADMIN — MUPIROCIN 1 APPLICATION: 20 OINTMENT TOPICAL at 22:45

## 2024-01-01 RX ADMIN — VALGANCICLOVIR HYDROCHLORIDE 450 MG: 450 TABLET ORAL at 09:11

## 2024-01-01 RX ADMIN — Medication 1 TABLET: at 08:08

## 2024-01-01 RX ADMIN — PANTOPRAZOLE SODIUM 40 MG: 40 TABLET, DELAYED RELEASE ORAL at 06:43

## 2024-01-01 RX ADMIN — HYDRALAZINE HYDROCHLORIDE 25 MG: 25 TABLET ORAL at 20:32

## 2024-01-01 RX ADMIN — CALCIUM GLUCONATE 5941 MG: 20 INJECTION, SOLUTION INTRAVENOUS at 10:31

## 2024-01-01 RX ADMIN — TRAZODONE HYDROCHLORIDE 50 MG: 50 TABLET ORAL at 21:05

## 2024-01-01 RX ADMIN — Medication 1 MG: at 18:02

## 2024-01-01 RX ADMIN — MEROPENEM 1 G: 1 INJECTION, POWDER, FOR SOLUTION INTRAVENOUS at 05:37

## 2024-01-01 RX ADMIN — ONDANSETRON 4 MG: 2 INJECTION INTRAMUSCULAR; INTRAVENOUS at 16:28

## 2024-01-01 RX ADMIN — HYDROMORPHONE HYDROCHLORIDE 0.2 MG: 1 INJECTION, SOLUTION INTRAMUSCULAR; INTRAVENOUS; SUBCUTANEOUS at 08:32

## 2024-01-01 RX ADMIN — ONDANSETRON 4 MG: 2 INJECTION INTRAMUSCULAR; INTRAVENOUS at 08:59

## 2024-01-01 RX ADMIN — BISACODYL 10 MG: 10 SUPPOSITORY RECTAL at 17:00

## 2024-01-01 RX ADMIN — OXYCODONE HYDROCHLORIDE 5 MG: 5 TABLET ORAL at 12:15

## 2024-01-01 RX ADMIN — HYDROMORPHONE HYDROCHLORIDE 0.2 MG: 1 INJECTION, SOLUTION INTRAMUSCULAR; INTRAVENOUS; SUBCUTANEOUS at 09:49

## 2024-01-01 RX ADMIN — PROPOFOL 25 MCG/KG/MIN: 10 INJECTION, EMULSION INTRAVENOUS at 06:30

## 2024-01-01 RX ADMIN — ROSUVASTATIN CALCIUM 10 MG: 10 TABLET, FILM COATED ORAL at 21:10

## 2024-01-01 RX ADMIN — METHOCARBAMOL 500 MG: 500 TABLET ORAL at 18:02

## 2024-01-01 RX ADMIN — CALCIUM GLUCONATE 1 G: 20 INJECTION, SOLUTION INTRAVENOUS at 21:19

## 2024-01-01 RX ADMIN — PREDNISONE 10 MG: 10 TABLET ORAL at 08:31

## 2024-01-01 RX ADMIN — NYSTATIN 500000 UNITS: 100000 SUSPENSION ORAL at 18:02

## 2024-01-01 RX ADMIN — ISOSORBIDE DINITRATE 40 MG: 10 TABLET ORAL at 09:07

## 2024-01-01 RX ADMIN — TACROLIMUS 2.5 MG: 1 CAPSULE ORAL at 06:07

## 2024-01-01 RX ADMIN — CALCITRIOL 0.5 MCG: 1 SOLUTION ORAL at 14:04

## 2024-01-01 RX ADMIN — HEPARIN SODIUM 1000 UNITS: 1000 INJECTION INTRAVENOUS; SUBCUTANEOUS at 12:55

## 2024-01-01 RX ADMIN — CALCITRIOL 0.5 MCG: 1 SOLUTION ORAL at 11:00

## 2024-01-01 RX ADMIN — MILRINONE LACTATE 0.25 MCG/KG/MIN: 200 INJECTION, SOLUTION INTRAVENOUS at 06:32

## 2024-01-01 RX ADMIN — HYDRALAZINE HYDROCHLORIDE 5 MG: 20 INJECTION INTRAMUSCULAR; INTRAVENOUS at 01:39

## 2024-01-01 RX ADMIN — SENNOSIDES AND DOCUSATE SODIUM 2 TABLET: 8.6; 5 TABLET ORAL at 10:56

## 2024-01-01 RX ADMIN — HYDROMORPHONE HYDROCHLORIDE 0.2 MG: 1 INJECTION, SOLUTION INTRAMUSCULAR; INTRAVENOUS; SUBCUTANEOUS at 19:19

## 2024-01-01 RX ADMIN — PREDNISONE 10 MG: 10 TABLET ORAL at 08:02

## 2024-01-01 RX ADMIN — TACROLIMUS 1.5 MG: 1 CAPSULE ORAL at 06:38

## 2024-01-01 RX ADMIN — CYANOCOBALAMIN TAB 1000 MCG 500 MCG: 1000 TAB at 09:27

## 2024-01-01 RX ADMIN — Medication 1.5 MG: at 06:16

## 2024-01-01 RX ADMIN — DIBASIC SODIUM PHOSPHATE, MONOBASIC POTASSIUM PHOSPHATE AND MONOBASIC SODIUM PHOSPHATE 500 MG: 852; 155; 130 TABLET ORAL at 08:00

## 2024-01-01 RX ADMIN — TACROLIMUS 1 MG: 1 CAPSULE ORAL at 18:30

## 2024-01-01 RX ADMIN — GLYCOPYRROLATE 0.6 MG: 0.2 INJECTION, SOLUTION INTRAMUSCULAR; INTRAVENOUS at 18:05

## 2024-01-01 RX ADMIN — LEVOTHYROXINE SODIUM 200 MCG: 0.2 TABLET ORAL at 06:12

## 2024-01-01 RX ADMIN — NYSTATIN 500000 UNITS: 100000 SUSPENSION ORAL at 18:05

## 2024-01-01 RX ADMIN — POLYETHYLENE GLYCOL 3350 17 G: 17 POWDER, FOR SOLUTION ORAL at 09:21

## 2024-01-01 RX ADMIN — ROSUVASTATIN 40 MG: 40 TABLET, FILM COATED ORAL at 20:32

## 2024-01-01 RX ADMIN — ONDANSETRON 4 MG: 2 INJECTION INTRAMUSCULAR; INTRAVENOUS at 07:44

## 2024-01-01 RX ADMIN — ROSUVASTATIN CALCIUM 10 MG: 10 TABLET, FILM COATED ORAL at 21:24

## 2024-01-01 RX ADMIN — FUROSEMIDE 80 MG: 10 INJECTION, SOLUTION INTRAMUSCULAR; INTRAVENOUS at 12:10

## 2024-01-01 RX ADMIN — PANTOPRAZOLE SODIUM 40 MG: 40 TABLET, DELAYED RELEASE ORAL at 09:09

## 2024-01-01 RX ADMIN — OXYCODONE HYDROCHLORIDE 5 MG: 5 TABLET ORAL at 07:40

## 2024-01-01 RX ADMIN — PIPERACILLIN SODIUM AND TAZOBACTAM SODIUM 4.5 G: 4; .5 INJECTION, SOLUTION INTRAVENOUS at 16:18

## 2024-01-01 RX ADMIN — ALBUMIN HUMAN 25 G: 0.05 INJECTION, SOLUTION INTRAVENOUS at 02:28

## 2024-01-01 RX ADMIN — POLYETHYLENE GLYCOL 3350 17 G: 17 POWDER, FOR SOLUTION ORAL at 10:53

## 2024-01-01 RX ADMIN — HYDROXYZINE HYDROCHLORIDE 25 MG: 25 TABLET, FILM COATED ORAL at 07:39

## 2024-01-01 RX ADMIN — SODIUM BICARBONATE 10 ML/HR: 84 INJECTION, SOLUTION INTRAVENOUS at 12:14

## 2024-01-01 RX ADMIN — LEVOTHYROXINE SODIUM 200 MCG: 0.2 TABLET ORAL at 04:40

## 2024-01-01 RX ADMIN — HYDROMORPHONE HYDROCHLORIDE 1 MG: 2 TABLET ORAL at 10:19

## 2024-01-01 RX ADMIN — SIROLIMUS 2.5 MG: 1 TABLET ORAL at 06:43

## 2024-01-01 RX ADMIN — PANTOPRAZOLE SODIUM 40 MG: 40 TABLET, DELAYED RELEASE ORAL at 21:01

## 2024-01-01 RX ADMIN — MUPIROCIN 1 APPLICATION: 20 OINTMENT TOPICAL at 09:17

## 2024-01-01 RX ADMIN — ACETAMINOPHEN 650 MG: 650 SOLUTION ORAL at 19:19

## 2024-01-01 RX ADMIN — DEXTROSE MONOHYDRATE 100 ML/HR: 50 INJECTION, SOLUTION INTRAVENOUS at 17:15

## 2024-01-01 RX ADMIN — HYDROMORPHONE HYDROCHLORIDE 0.2 MG: 1 INJECTION, SOLUTION INTRAMUSCULAR; INTRAVENOUS; SUBCUTANEOUS at 11:43

## 2024-01-01 RX ADMIN — DARBEPOETIN ALFA 100 MCG: 100 INJECTION, SOLUTION INTRAVENOUS; SUBCUTANEOUS at 08:04

## 2024-01-01 RX ADMIN — HYDROMORPHONE HYDROCHLORIDE 0.2 MG: 1 INJECTION, SOLUTION INTRAMUSCULAR; INTRAVENOUS; SUBCUTANEOUS at 19:47

## 2024-01-01 RX ADMIN — CALCIUM CHLORIDE, MAGNESIUM CHLORIDE, DEXTROSE MONOHYDRATE, LACTIC ACID, SODIUM CHLORIDE, SODIUM BICARBONATE AND POTASSIUM CHLORIDE 2400 ML/HR: 3.68; 3.05; 22; 5.4; 6.46; 3.09; .314 INJECTION INTRAVENOUS at 08:13

## 2024-01-01 RX ADMIN — Medication 1.5 MG: at 06:30

## 2024-01-01 RX ADMIN — PROMETHAZINE HYDROCHLORIDE 12.5 MG: 25 INJECTION INTRAMUSCULAR; INTRAVENOUS at 12:30

## 2024-01-01 RX ADMIN — POTASSIUM & SODIUM PHOSPHATES POWDER PACK 280-160-250 MG 1 PACKET: 280-160-250 PACK at 20:47

## 2024-01-01 RX ADMIN — FOLIC ACID 1 MG: 1 TABLET ORAL at 08:43

## 2024-01-01 RX ADMIN — MAGNESIUM SULFATE HEPTAHYDRATE 2 G: 40 INJECTION, SOLUTION INTRAVENOUS at 19:40

## 2024-01-01 RX ADMIN — CALCIUM GLUCONATE 2 G: 20 INJECTION, SOLUTION INTRAVENOUS at 08:42

## 2024-01-01 RX ADMIN — POLYETHYLENE GLYCOL 3350 17 G: 17 POWDER, FOR SOLUTION ORAL at 08:17

## 2024-01-01 RX ADMIN — CALCIUM CHLORIDE, MAGNESIUM CHLORIDE, DEXTROSE MONOHYDRATE, LACTIC ACID, SODIUM CHLORIDE, SODIUM BICARBONATE AND POTASSIUM CHLORIDE 2400 ML/HR: 3.68; 3.05; 22; 5.4; 6.46; 3.09; .314 INJECTION INTRAVENOUS at 14:53

## 2024-01-01 RX ADMIN — VALGANCICLOVIR HYDROCHLORIDE 450 MG: 50 POWDER, FOR SOLUTION ORAL at 17:19

## 2024-01-01 RX ADMIN — INSULIN LISPRO 2 UNITS: 100 INJECTION, SOLUTION INTRAVENOUS; SUBCUTANEOUS at 06:56

## 2024-01-01 RX ADMIN — FOLIC ACID 1 MG: 1 TABLET ORAL at 08:49

## 2024-01-01 RX ADMIN — HYDRALAZINE HYDROCHLORIDE 10 MG: 20 INJECTION INTRAMUSCULAR; INTRAVENOUS at 13:16

## 2024-01-01 RX ADMIN — HYDROXYZINE HYDROCHLORIDE 25 MG: 25 TABLET, FILM COATED ORAL at 21:20

## 2024-01-01 RX ADMIN — Medication 2.5 MG: at 06:27

## 2024-01-01 RX ADMIN — ROSUVASTATIN 40 MG: 40 TABLET, FILM COATED ORAL at 20:35

## 2024-01-01 RX ADMIN — VALGANCICLOVIR HYDROCHLORIDE 450 MG: 450 TABLET ORAL at 09:22

## 2024-01-01 RX ADMIN — OXYCODONE HYDROCHLORIDE 5 MG: 5 TABLET ORAL at 00:28

## 2024-01-01 RX ADMIN — CALCIUM CARBONATE (ANTACID) CHEW TAB 500 MG 1500 MG: 500 CHEW TAB at 16:13

## 2024-01-01 RX ADMIN — FERROUS SULFATE TAB 325 MG (65 MG ELEMENTAL FE) 1 TABLET: 325 (65 FE) TAB at 07:40

## 2024-01-01 RX ADMIN — NYSTATIN 500000 UNITS: 100000 SUSPENSION ORAL at 01:08

## 2024-01-01 RX ADMIN — DIBASIC SODIUM PHOSPHATE, MONOBASIC POTASSIUM PHOSPHATE AND MONOBASIC SODIUM PHOSPHATE 500 MG: 852; 155; 130 TABLET ORAL at 15:13

## 2024-01-01 RX ADMIN — DEXMEDETOMIDINE HYDROCHLORIDE 0.3 MCG/KG/HR: 400 INJECTION INTRAVENOUS at 04:01

## 2024-01-01 RX ADMIN — ONDANSETRON 4 MG: 2 INJECTION INTRAMUSCULAR; INTRAVENOUS at 16:13

## 2024-01-01 RX ADMIN — LIDOCAINE 1 PATCH: 4 PATCH TOPICAL at 09:14

## 2024-01-01 RX ADMIN — DEXMEDETOMIDINE HYDROCHLORIDE 0.6 MCG/KG/HR: 400 INJECTION INTRAVENOUS at 09:22

## 2024-01-01 RX ADMIN — CALCITRIOL 0.5 MCG: 1 SOLUTION ORAL at 08:42

## 2024-01-01 RX ADMIN — SIROLIMUS 0.5 MG: 0.5 TABLET, SUGAR COATED ORAL at 09:58

## 2024-01-01 RX ADMIN — DEXMEDETOMIDINE HYDROCHLORIDE 0.5 MCG/KG/HR: 400 INJECTION INTRAVENOUS at 09:10

## 2024-01-01 RX ADMIN — INSULIN LISPRO 4 UNITS: 100 INJECTION, SOLUTION INTRAVENOUS; SUBCUTANEOUS at 16:35

## 2024-01-01 RX ADMIN — PANTOPRAZOLE SODIUM 40 MG: 40 TABLET, DELAYED RELEASE ORAL at 08:04

## 2024-01-01 RX ADMIN — ONDANSETRON 4 MG: 2 INJECTION INTRAMUSCULAR; INTRAVENOUS at 19:59

## 2024-01-01 RX ADMIN — INSULIN LISPRO 2 UNITS: 100 INJECTION, SOLUTION INTRAVENOUS; SUBCUTANEOUS at 08:05

## 2024-01-01 RX ADMIN — VANCOMYCIN HYDROCHLORIDE 1000 MG: 1 INJECTION, SOLUTION INTRAVENOUS at 20:42

## 2024-01-01 RX ADMIN — CALCIUM GLUCONATE 1 G: 20 INJECTION, SOLUTION INTRAVENOUS at 14:03

## 2024-01-01 RX ADMIN — OXYCODONE HYDROCHLORIDE 5 MG: 5 TABLET ORAL at 09:16

## 2024-01-01 RX ADMIN — SALINE NASAL SPRAY 5 SPRAY: 1.5 SOLUTION NASAL at 09:23

## 2024-01-01 RX ADMIN — CALCIUM 1250 MG: 500 TABLET ORAL at 16:37

## 2024-01-01 RX ADMIN — CEPHALEXIN 500 MG: 250 FOR SUSPENSION ORAL at 09:08

## 2024-01-01 RX ADMIN — CALCIUM 1250 MG: 500 TABLET ORAL at 09:48

## 2024-01-01 RX ADMIN — ONDANSETRON 4 MG: 2 INJECTION INTRAMUSCULAR; INTRAVENOUS at 03:57

## 2024-01-01 RX ADMIN — LIDOCAINE HYDROCHLORIDE 50 MG: 20 INJECTION, SOLUTION INFILTRATION; PERINEURAL at 12:47

## 2024-01-01 RX ADMIN — SODIUM PHOSPHATE, MONOBASIC, MONOHYDRATE AND SODIUM PHOSPHATE, DIBASIC, ANHYDROUS 30 MMOL: 142; 276 INJECTION, SOLUTION INTRAVENOUS at 23:31

## 2024-01-01 RX ADMIN — LEVOTHYROXINE SODIUM 200 MCG: 0.2 TABLET ORAL at 05:52

## 2024-01-01 RX ADMIN — ASPIRIN 81 MG: 81 TABLET, COATED ORAL at 09:08

## 2024-01-01 RX ADMIN — ALBUMIN HUMAN 50 G: 0.25 SOLUTION INTRAVENOUS at 08:56

## 2024-01-01 RX ADMIN — MILRINONE LACTATE 0.25 MCG/KG/MIN: 200 INJECTION, SOLUTION INTRAVENOUS at 11:21

## 2024-01-01 RX ADMIN — SODIUM CHLORIDE, SODIUM LACTATE, POTASSIUM CHLORIDE, AND CALCIUM CHLORIDE 500 ML: 600; 310; 30; 20 INJECTION, SOLUTION INTRAVENOUS at 03:30

## 2024-01-01 RX ADMIN — ALBUMIN HUMAN 25 G: 0.25 SOLUTION INTRAVENOUS at 20:32

## 2024-01-01 RX ADMIN — DIBASIC SODIUM PHOSPHATE, MONOBASIC POTASSIUM PHOSPHATE AND MONOBASIC SODIUM PHOSPHATE 500 MG: 852; 155; 130 TABLET ORAL at 20:00

## 2024-01-01 RX ADMIN — PANTOPRAZOLE SODIUM 40 MG: 40 INJECTION, POWDER, FOR SOLUTION INTRAVENOUS at 09:25

## 2024-01-01 RX ADMIN — CALCITRIOL 0.5 MCG: 0.5 CAPSULE ORAL at 08:33

## 2024-01-01 RX ADMIN — LIDOCAINE 1 PATCH: 4 PATCH TOPICAL at 12:05

## 2024-01-01 RX ADMIN — SENNOSIDES AND DOCUSATE SODIUM 1 TABLET: 8.6; 5 TABLET ORAL at 09:08

## 2024-01-01 RX ADMIN — DEXTROSE MONOHYDRATE 25 ML/HR: 50 INJECTION, SOLUTION INTRAVENOUS at 18:54

## 2024-01-01 RX ADMIN — MAGNESIUM HYDROXIDE 30 ML: 400 SUSPENSION ORAL at 12:24

## 2024-01-01 RX ADMIN — MIDAZOLAM HYDROCHLORIDE 2 MG: 1 INJECTION, SOLUTION INTRAMUSCULAR; INTRAVENOUS at 18:38

## 2024-01-01 RX ADMIN — CALCIUM GLUCONATE 1 G: 20 INJECTION, SOLUTION INTRAVENOUS at 03:39

## 2024-01-01 RX ADMIN — MYCOPHENOLIC ACID 360 MG: 360 TABLET, DELAYED RELEASE ORAL at 06:02

## 2024-01-01 RX ADMIN — HEPARIN SODIUM 5000 UNITS: 5000 INJECTION INTRAVENOUS; SUBCUTANEOUS at 08:56

## 2024-01-01 RX ADMIN — MYCOPHENOLIC ACID 180 MG: 180 TABLET, DELAYED RELEASE ORAL at 06:20

## 2024-01-01 RX ADMIN — Medication 1 TABLET: at 09:25

## 2024-01-01 RX ADMIN — SODIUM CHLORIDE, POTASSIUM CHLORIDE, SODIUM LACTATE AND CALCIUM CHLORIDE 500 ML: 600; 310; 30; 20 INJECTION, SOLUTION INTRAVENOUS at 15:00

## 2024-01-01 RX ADMIN — Medication 25 MCG/HR: at 11:35

## 2024-01-01 RX ADMIN — PIPERACILLIN SODIUM AND TAZOBACTAM SODIUM 3.38 G: 3; .375 INJECTION, SOLUTION INTRAVENOUS at 20:03

## 2024-01-01 RX ADMIN — MIDAZOLAM HYDROCHLORIDE 0.5 MG: 1 INJECTION, SOLUTION INTRAMUSCULAR; INTRAVENOUS at 15:59

## 2024-01-01 RX ADMIN — FENTANYL CITRATE 50 MCG: 50 INJECTION, SOLUTION INTRAMUSCULAR; INTRAVENOUS at 18:26

## 2024-01-01 RX ADMIN — ALBUMIN HUMAN 25 G: 0.25 SOLUTION INTRAVENOUS at 09:43

## 2024-01-01 RX ADMIN — DIPHENHYDRAMINE HYDROCHLORIDE 25 MG: 25 CAPSULE ORAL at 13:41

## 2024-01-01 RX ADMIN — BIVALIRUDIN 0.05 MG/KG/HR: 250 INJECTION, POWDER, LYOPHILIZED, FOR SOLUTION INTRAVENOUS at 14:34

## 2024-01-01 RX ADMIN — Medication 2.5 MG: at 06:13

## 2024-01-01 RX ADMIN — PREDNISONE 10 MG: 10 TABLET ORAL at 08:45

## 2024-01-01 RX ADMIN — HYDROMORPHONE HYDROCHLORIDE 0.2 MG: 1 INJECTION, SOLUTION INTRAMUSCULAR; INTRAVENOUS; SUBCUTANEOUS at 16:30

## 2024-01-01 RX ADMIN — DEXMEDETOMIDINE HYDROCHLORIDE 0.2 MCG/KG/HR: 400 INJECTION INTRAVENOUS at 22:52

## 2024-01-01 RX ADMIN — HEPARIN SODIUM 1400 UNITS: 1000 INJECTION INTRAVENOUS; SUBCUTANEOUS at 16:40

## 2024-01-01 RX ADMIN — INSULIN LISPRO 2 UNITS: 100 INJECTION, SOLUTION INTRAVENOUS; SUBCUTANEOUS at 21:21

## 2024-01-01 RX ADMIN — NYSTATIN 500000 UNITS: 100000 SUSPENSION ORAL at 11:51

## 2024-01-01 RX ADMIN — MYCOPHENOLIC ACID 360 MG: 180 TABLET, DELAYED RELEASE ORAL at 17:46

## 2024-01-01 RX ADMIN — HYDROXYZINE HYDROCHLORIDE 25 MG: 25 TABLET, FILM COATED ORAL at 09:58

## 2024-01-01 RX ADMIN — HYDRALAZINE HYDROCHLORIDE 5 MG: 20 INJECTION INTRAMUSCULAR; INTRAVENOUS at 04:30

## 2024-01-01 RX ADMIN — NYSTATIN 500000 UNITS: 100000 SUSPENSION ORAL at 23:52

## 2024-01-01 RX ADMIN — Medication 10 MG: at 20:29

## 2024-01-01 RX ADMIN — CYANOCOBALAMIN TAB 1000 MCG 500 MCG: 1000 TAB at 08:05

## 2024-01-01 RX ADMIN — INSULIN HUMAN 6 UNITS: 100 INJECTION, SOLUTION PARENTERAL at 12:39

## 2024-01-01 RX ADMIN — CLEVIPIDINE 8 MG/HR: 0.5 EMULSION INTRAVENOUS at 13:21

## 2024-01-01 RX ADMIN — INSULIN HUMAN 6 UNITS: 100 INJECTION, SOLUTION PARENTERAL at 18:13

## 2024-01-01 RX ADMIN — FERROUS SULFATE TAB 325 MG (65 MG ELEMENTAL FE) 1 TABLET: 325 (65 FE) TAB at 09:48

## 2024-01-01 RX ADMIN — NYSTATIN 500000 UNITS: 100000 SUSPENSION ORAL at 14:03

## 2024-01-01 RX ADMIN — TACROLIMUS 1 MG: 1 CAPSULE ORAL at 06:21

## 2024-01-01 RX ADMIN — MYCOPHENOLIC ACID 180 MG: 180 TABLET, DELAYED RELEASE ORAL at 06:07

## 2024-01-01 RX ADMIN — CALCIUM GLUCONATE 5752 MG: 20 INJECTION, SOLUTION INTRAVENOUS at 20:21

## 2024-01-01 RX ADMIN — HYDRALAZINE HYDROCHLORIDE 100 MG: 100 TABLET, FILM COATED ORAL at 08:31

## 2024-01-01 RX ADMIN — Medication 5 MG: at 20:46

## 2024-01-01 RX ADMIN — DEXTROSE MONOHYDRATE 100 ML/HR: 50 INJECTION, SOLUTION INTRAVENOUS at 06:48

## 2024-01-01 RX ADMIN — ACETAMINOPHEN 650 MG: 325 TABLET ORAL at 09:28

## 2024-01-01 RX ADMIN — HEPARIN SODIUM 5000 UNITS: 5000 INJECTION INTRAVENOUS; SUBCUTANEOUS at 10:53

## 2024-01-01 RX ADMIN — INSULIN HUMAN 3 UNITS: 100 INJECTION, SOLUTION PARENTERAL at 13:30

## 2024-01-01 RX ADMIN — INSULIN LISPRO 2 UNITS: 100 INJECTION, SOLUTION INTRAVENOUS; SUBCUTANEOUS at 08:45

## 2024-01-01 RX ADMIN — MYCOPHENOLIC ACID 360 MG: 360 TABLET, DELAYED RELEASE ORAL at 18:11

## 2024-01-01 RX ADMIN — Medication 15 ML: at 09:07

## 2024-01-01 RX ADMIN — HYDROMORPHONE HYDROCHLORIDE 0.2 MG: 1 INJECTION, SOLUTION INTRAMUSCULAR; INTRAVENOUS; SUBCUTANEOUS at 15:49

## 2024-01-01 RX ADMIN — ONDANSETRON 4 MG: 2 INJECTION INTRAMUSCULAR; INTRAVENOUS at 08:34

## 2024-01-01 RX ADMIN — ONDANSETRON 4 MG: 2 INJECTION INTRAMUSCULAR; INTRAVENOUS at 15:22

## 2024-01-01 RX ADMIN — ONDANSETRON 4 MG: 2 INJECTION INTRAMUSCULAR; INTRAVENOUS at 19:22

## 2024-01-01 RX ADMIN — PROPOFOL 45 MCG/KG/MIN: 10 INJECTION, EMULSION INTRAVENOUS at 23:43

## 2024-01-01 RX ADMIN — INSULIN LISPRO 5 UNITS: 100 INJECTION, SOLUTION INTRAVENOUS; SUBCUTANEOUS at 16:25

## 2024-01-01 RX ADMIN — SALINE NASAL SPRAY 5 SPRAY: 1.5 SOLUTION NASAL at 09:45

## 2024-01-01 RX ADMIN — TACROLIMUS 4 MG: 1 CAPSULE ORAL at 17:41

## 2024-01-01 RX ADMIN — TACROLIMUS 2 MG: 1 CAPSULE ORAL at 06:36

## 2024-01-01 RX ADMIN — HYDROMORPHONE HYDROCHLORIDE 0.2 MG: 1 INJECTION, SOLUTION INTRAMUSCULAR; INTRAVENOUS; SUBCUTANEOUS at 02:07

## 2024-01-01 RX ADMIN — DOBUTAMINE HYDROCHLORIDE 5 MCG/KG/MIN: 400 INJECTION INTRAVENOUS at 08:05

## 2024-01-01 RX ADMIN — FUROSEMIDE 60 MG: 10 INJECTION, SOLUTION INTRAMUSCULAR; INTRAVENOUS at 18:50

## 2024-01-01 RX ADMIN — Medication 0.02 MCG/KG/MIN: at 11:15

## 2024-01-01 RX ADMIN — DIBASIC SODIUM PHOSPHATE, MONOBASIC POTASSIUM PHOSPHATE AND MONOBASIC SODIUM PHOSPHATE 500 MG: 852; 155; 130 TABLET ORAL at 16:19

## 2024-01-01 RX ADMIN — HYDROMORPHONE HYDROCHLORIDE 0.2 MG: 1 INJECTION, SOLUTION INTRAMUSCULAR; INTRAVENOUS; SUBCUTANEOUS at 05:16

## 2024-01-01 RX ADMIN — Medication 1 TABLET: at 09:48

## 2024-01-01 RX ADMIN — VORICONAZOLE 340 MG: 10 INJECTION, POWDER, LYOPHILIZED, FOR SOLUTION INTRAVENOUS at 05:37

## 2024-01-01 RX ADMIN — NYSTATIN 500000 UNITS: 100000 SUSPENSION ORAL at 05:54

## 2024-01-01 RX ADMIN — HEPARIN SODIUM 5000 UNITS: 5000 INJECTION INTRAVENOUS; SUBCUTANEOUS at 01:07

## 2024-01-01 RX ADMIN — NYSTATIN 500000 UNITS: 100000 SUSPENSION ORAL at 06:39

## 2024-01-01 RX ADMIN — PERFLUTREN 2 ML OF DILUTION: 6.52 INJECTION, SUSPENSION INTRAVENOUS at 10:17

## 2024-01-01 RX ADMIN — MYCOPHENOLIC ACID 180 MG: 180 TABLET, DELAYED RELEASE ORAL at 18:11

## 2024-01-01 RX ADMIN — NYSTATIN 500000 UNITS: 100000 SUSPENSION ORAL at 14:01

## 2024-01-01 RX ADMIN — ISOSORBIDE DINITRATE 10 MG: 10 TABLET ORAL at 18:04

## 2024-01-01 RX ADMIN — CALCITRIOL 0.5 MCG: 0.5 CAPSULE ORAL at 08:47

## 2024-01-01 RX ADMIN — PROPOFOL 45 MCG/KG/MIN: 10 INJECTION, EMULSION INTRAVENOUS at 20:35

## 2024-01-01 RX ADMIN — OXYCODONE HYDROCHLORIDE 5 MG: 5 TABLET ORAL at 17:21

## 2024-01-01 RX ADMIN — TACROLIMUS 3.5 MG: 1 CAPSULE ORAL at 18:21

## 2024-01-01 RX ADMIN — HYDROXYZINE HYDROCHLORIDE 25 MG: 25 TABLET, FILM COATED ORAL at 17:09

## 2024-01-01 RX ADMIN — DEXTROSE MONOHYDRATE 0.02 MCG/KG/MIN: 50 INJECTION, SOLUTION INTRAVENOUS at 19:09

## 2024-01-01 RX ADMIN — HEPARIN SODIUM AND DEXTROSE 1800 UNITS/HR: 10000; 5 INJECTION INTRAVENOUS at 09:42

## 2024-01-01 RX ADMIN — BISACODYL 10 MG: 10 SUPPOSITORY RECTAL at 09:11

## 2024-01-01 RX ADMIN — ANTI-THYMOCYTE GLOBULIN (RABBIT) 150 MG: 5 INJECTION, POWDER, LYOPHILIZED, FOR SOLUTION INTRAVENOUS at 20:26

## 2024-01-01 RX ADMIN — ALBUMIN HUMAN 12.5 G: 0.05 INJECTION, SOLUTION INTRAVENOUS at 01:14

## 2024-01-01 RX ADMIN — CALCIUM CHLORIDE 0.5 G: 100 INJECTION, SOLUTION INTRAVENOUS at 16:40

## 2024-01-01 RX ADMIN — NYSTATIN 500000 UNITS: 100000 SUSPENSION ORAL at 06:07

## 2024-01-01 RX ADMIN — Medication 5 MG: at 21:01

## 2024-01-01 RX ADMIN — ALBUMIN HUMAN 12.5 G: 50 SOLUTION INTRAVENOUS at 05:41

## 2024-01-01 RX ADMIN — HYDRALAZINE HYDROCHLORIDE 25 MG: 25 TABLET ORAL at 15:47

## 2024-01-01 RX ADMIN — MYCOPHENOLIC ACID 360 MG: 360 TABLET, DELAYED RELEASE ORAL at 18:02

## 2024-01-01 RX ADMIN — CALCIUM CARBONATE (ANTACID) CHEW TAB 500 MG 1500 MG: 500 CHEW TAB at 03:38

## 2024-01-01 RX ADMIN — ALBUMIN HUMAN 25 G: 250 SOLUTION INTRAVENOUS at 13:13

## 2024-01-01 RX ADMIN — CALCIUM GLUCONATE 5512 MG: 20 INJECTION, SOLUTION INTRAVENOUS at 12:10

## 2024-01-01 RX ADMIN — FOLIC ACID 1 MG: 1 TABLET ORAL at 09:13

## 2024-01-01 RX ADMIN — CEFAZOLIN SODIUM 2 G: 2 INJECTION, SOLUTION INTRAVENOUS at 03:08

## 2024-01-01 RX ADMIN — ALBUMIN HUMAN 25 G: 0.25 SOLUTION INTRAVENOUS at 10:20

## 2024-01-01 RX ADMIN — ONDANSETRON 4 MG: 2 INJECTION INTRAMUSCULAR; INTRAVENOUS at 04:06

## 2024-01-01 RX ADMIN — PROPOFOL 30 MCG/KG/MIN: 10 INJECTION, EMULSION INTRAVENOUS at 11:40

## 2024-01-01 RX ADMIN — HYDROMORPHONE HYDROCHLORIDE 0.2 MG: 1 INJECTION, SOLUTION INTRAMUSCULAR; INTRAVENOUS; SUBCUTANEOUS at 18:47

## 2024-01-01 RX ADMIN — CALCITRIOL 0.5 MCG: 1 SOLUTION ORAL at 08:23

## 2024-01-01 RX ADMIN — ONDANSETRON 4 MG: 2 INJECTION INTRAMUSCULAR; INTRAVENOUS at 06:03

## 2024-01-01 RX ADMIN — OXYCODONE HYDROCHLORIDE 5 MG: 5 TABLET ORAL at 00:52

## 2024-01-01 RX ADMIN — MILRINONE LACTATE 0.25 MCG/KG/MIN: 200 INJECTION, SOLUTION INTRAVENOUS at 02:35

## 2024-01-01 RX ADMIN — PIPERACILLIN SODIUM AND TAZOBACTAM SODIUM 4.5 G: 4; .5 INJECTION, SOLUTION INTRAVENOUS at 15:45

## 2024-01-01 RX ADMIN — ACETAMINOPHEN 650 MG: 325 TABLET ORAL at 08:08

## 2024-01-01 RX ADMIN — TACROLIMUS 1 MG: 1 CAPSULE ORAL at 06:37

## 2024-01-01 RX ADMIN — HEPARIN SODIUM 10 ML/HR: 1000 INJECTION INTRAVENOUS; SUBCUTANEOUS at 05:46

## 2024-01-01 RX ADMIN — PSYLLIUM HUSK 1 PACKET: 3.4 POWDER ORAL at 09:19

## 2024-01-01 RX ADMIN — DIPHENHYDRAMINE HYDROCHLORIDE 25 MG: 25 CAPSULE ORAL at 15:22

## 2024-01-01 RX ADMIN — HYDROXYZINE HYDROCHLORIDE 25 MG: 25 TABLET, FILM COATED ORAL at 07:30

## 2024-01-01 RX ADMIN — VANCOMYCIN HYDROCHLORIDE 750 MG: 750 INJECTION, SOLUTION INTRAVENOUS at 21:15

## 2024-01-01 RX ADMIN — NYSTATIN 500000 UNITS: 100000 SUSPENSION ORAL at 00:19

## 2024-01-01 RX ADMIN — INSULIN LISPRO 2 UNITS: 100 INJECTION, SOLUTION INTRAVENOUS; SUBCUTANEOUS at 20:51

## 2024-01-01 RX ADMIN — POLYETHYLENE GLYCOL 3350 17 G: 17 POWDER, FOR SOLUTION ORAL at 15:20

## 2024-01-01 RX ADMIN — HYDRALAZINE HYDROCHLORIDE 5 MG: 20 INJECTION INTRAMUSCULAR; INTRAVENOUS at 10:15

## 2024-01-01 RX ADMIN — FOLIC ACID 1 MG: 1 TABLET ORAL at 08:07

## 2024-01-01 RX ADMIN — Medication 1 TABLET: at 08:21

## 2024-01-01 RX ADMIN — ONDANSETRON HYDROCHLORIDE 4 MG: 2 INJECTION, SOLUTION INTRAVENOUS at 04:27

## 2024-01-01 RX ADMIN — ALBUMIN HUMAN 12.5 G: 0.25 SOLUTION INTRAVENOUS at 04:52

## 2024-01-01 RX ADMIN — CALCIUM CHLORIDE, MAGNESIUM CHLORIDE, DEXTROSE MONOHYDRATE, LACTIC ACID, SODIUM CHLORIDE, SODIUM BICARBONATE AND POTASSIUM CHLORIDE 2400 ML/HR: 3.68; 3.05; 22; 5.4; 6.46; 3.09; .314 INJECTION INTRAVENOUS at 18:29

## 2024-01-01 RX ADMIN — Medication 1 TABLET: at 08:39

## 2024-01-01 RX ADMIN — CALCITRIOL 0.5 MCG: 1 SOLUTION ORAL at 08:25

## 2024-01-01 RX ADMIN — NYSTATIN 500000 UNITS: 100000 SUSPENSION ORAL at 12:44

## 2024-01-01 RX ADMIN — DEXMEDETOMIDINE HYDROCHLORIDE 1 MCG/KG/HR: 400 INJECTION INTRAVENOUS at 05:23

## 2024-01-01 RX ADMIN — HYDROMORPHONE HYDROCHLORIDE 0.2 MG: 1 INJECTION, SOLUTION INTRAMUSCULAR; INTRAVENOUS; SUBCUTANEOUS at 11:55

## 2024-01-01 RX ADMIN — INSULIN LISPRO 2 UNITS: 100 INJECTION, SOLUTION INTRAVENOUS; SUBCUTANEOUS at 06:34

## 2024-01-01 RX ADMIN — TACROLIMUS 3.5 MG: 1 CAPSULE ORAL at 18:22

## 2024-01-01 RX ADMIN — OXYCODONE HYDROCHLORIDE 5 MG: 5 TABLET ORAL at 10:14

## 2024-01-01 RX ADMIN — LEVOTHYROXINE SODIUM 200 MCG: 0.2 TABLET ORAL at 06:36

## 2024-01-01 RX ADMIN — ASPIRIN 81 MG: 81 TABLET, COATED ORAL at 08:39

## 2024-01-01 RX ADMIN — ALBUMIN HUMAN 62.5 G: 0.05 INJECTION, SOLUTION INTRAVENOUS at 10:51

## 2024-01-01 RX ADMIN — Medication 1 TABLET: at 08:45

## 2024-01-01 RX ADMIN — NYSTATIN 500000 UNITS: 100000 SUSPENSION ORAL at 12:05

## 2024-01-01 RX ADMIN — HEPARIN SODIUM 5000 UNITS: 5000 INJECTION INTRAVENOUS; SUBCUTANEOUS at 17:39

## 2024-01-01 RX ADMIN — DIPHENHYDRAMINE HYDROCHLORIDE 25 MG: 50 INJECTION INTRAMUSCULAR; INTRAVENOUS at 02:04

## 2024-01-01 RX ADMIN — ROCURONIUM BROMIDE 20 MG: 10 INJECTION, SOLUTION INTRAVENOUS at 21:05

## 2024-01-01 RX ADMIN — PREDNISONE 30 MG: 10 TABLET ORAL at 11:58

## 2024-01-01 RX ADMIN — PERFLUTREN 10 ML OF DILUTION: 6.52 INJECTION, SUSPENSION INTRAVENOUS at 11:34

## 2024-01-01 RX ADMIN — HYDRALAZINE HYDROCHLORIDE 100 MG: 100 TABLET, FILM COATED ORAL at 00:34

## 2024-01-01 RX ADMIN — SENNOSIDES AND DOCUSATE SODIUM 2 TABLET: 8.6; 5 TABLET ORAL at 08:20

## 2024-01-01 RX ADMIN — SODIUM CHLORIDE, POTASSIUM CHLORIDE, SODIUM LACTATE AND CALCIUM CHLORIDE: 600; 310; 30; 20 INJECTION, SOLUTION INTRAVENOUS at 18:50

## 2024-01-01 RX ADMIN — CALCITRIOL 0.5 MCG: 1 SOLUTION ORAL at 08:39

## 2024-01-01 RX ADMIN — ACETAMINOPHEN 650 MG: 325 TABLET ORAL at 05:02

## 2024-01-01 RX ADMIN — IMMUNE GLOBULIN INFUSION (HUMAN) 10 G: 100 INJECTION, SOLUTION INTRAVENOUS; SUBCUTANEOUS at 15:22

## 2024-01-01 RX ADMIN — POLYETHYLENE GLYCOL 3350 17 G: 17 POWDER, FOR SOLUTION ORAL at 08:42

## 2024-01-01 RX ADMIN — NYSTATIN 500000 UNITS: 100000 SUSPENSION ORAL at 01:59

## 2024-01-01 RX ADMIN — PHYTONADIONE 10 MG: 10 INJECTION, EMULSION INTRAMUSCULAR; INTRAVENOUS; SUBCUTANEOUS at 08:07

## 2024-01-01 RX ADMIN — IMMUNE GLOBULIN INFUSION (HUMAN) 10 G: 100 INJECTION, SOLUTION INTRAVENOUS; SUBCUTANEOUS at 14:00

## 2024-01-01 RX ADMIN — SODIUM CHLORIDE, POTASSIUM CHLORIDE, SODIUM LACTATE AND CALCIUM CHLORIDE 10 ML/HR: 600; 310; 30; 20 INJECTION, SOLUTION INTRAVENOUS at 11:30

## 2024-01-01 RX ADMIN — LIDOCAINE 1 PATCH: 4 PATCH TOPICAL at 09:33

## 2024-01-01 RX ADMIN — MILRINONE LACTATE 0.38 MCG/KG/MIN: 200 INJECTION, SOLUTION INTRAVENOUS at 22:09

## 2024-01-01 RX ADMIN — NYSTATIN 500000 UNITS: 100000 SUSPENSION ORAL at 00:34

## 2024-01-01 RX ADMIN — PIPERACILLIN SODIUM AND TAZOBACTAM SODIUM 4.5 G: 4; .5 INJECTION, SOLUTION INTRAVENOUS at 21:09

## 2024-01-01 RX ADMIN — ACETAMINOPHEN 650 MG: 325 TABLET ORAL at 05:03

## 2024-01-01 RX ADMIN — PIPERACILLIN SODIUM AND TAZOBACTAM SODIUM 3.38 G: 3; .375 INJECTION, SOLUTION INTRAVENOUS at 19:50

## 2024-01-01 RX ADMIN — NYSTATIN 400000 UNITS: 100000 SUSPENSION ORAL at 13:39

## 2024-01-01 RX ADMIN — MYCOPHENOLIC ACID 360 MG: 360 TABLET, DELAYED RELEASE ORAL at 18:17

## 2024-01-01 RX ADMIN — TRAZODONE HYDROCHLORIDE 50 MG: 50 TABLET ORAL at 19:55

## 2024-01-01 RX ADMIN — HEPARIN SODIUM 5000 UNITS: 5000 INJECTION INTRAVENOUS; SUBCUTANEOUS at 00:56

## 2024-01-01 RX ADMIN — HYDRALAZINE HYDROCHLORIDE 20 MG: 20 INJECTION INTRAMUSCULAR; INTRAVENOUS at 20:55

## 2024-01-01 RX ADMIN — CALCIUM GLUCONATE 1 G: 20 INJECTION, SOLUTION INTRAVENOUS at 12:46

## 2024-01-01 RX ADMIN — Medication 10 MG: at 20:48

## 2024-01-01 RX ADMIN — HEPARIN SODIUM 10 ML/HR: 1000 INJECTION INTRAVENOUS; SUBCUTANEOUS at 12:34

## 2024-01-01 RX ADMIN — METHOCARBAMOL 500 MG: 500 TABLET ORAL at 08:19

## 2024-01-01 RX ADMIN — VANCOMYCIN HYDROCHLORIDE 1750 MG: 5 INJECTION, POWDER, LYOPHILIZED, FOR SOLUTION INTRAVENOUS at 12:19

## 2024-01-01 RX ADMIN — ALBUMIN HUMAN 25 G: 0.25 SOLUTION INTRAVENOUS at 16:03

## 2024-01-01 RX ADMIN — HYDROXYZINE HYDROCHLORIDE 25 MG: 25 TABLET, FILM COATED ORAL at 19:32

## 2024-01-01 RX ADMIN — PREDNISONE 10 MG: 10 TABLET ORAL at 08:52

## 2024-01-01 RX ADMIN — OXYCODONE HYDROCHLORIDE 5 MG: 5 TABLET ORAL at 16:18

## 2024-01-01 RX ADMIN — HYDRALAZINE HYDROCHLORIDE 5 MG: 20 INJECTION INTRAMUSCULAR; INTRAVENOUS at 14:45

## 2024-01-01 RX ADMIN — MYCOPHENOLIC ACID 360 MG: 360 TABLET, DELAYED RELEASE ORAL at 06:38

## 2024-01-01 RX ADMIN — SALINE NASAL SPRAY 1 SPRAY: 1.5 SOLUTION NASAL at 12:52

## 2024-01-01 RX ADMIN — MILRINONE LACTATE 0.25 MCG/KG/MIN: 200 INJECTION, SOLUTION INTRAVENOUS at 18:44

## 2024-01-01 RX ADMIN — PREDNISONE 10 MG: 10 TABLET ORAL at 09:26

## 2024-01-01 RX ADMIN — MYCOPHENOLIC ACID 180 MG: 180 TABLET, DELAYED RELEASE ORAL at 08:08

## 2024-01-01 RX ADMIN — HYDROMORPHONE HYDROCHLORIDE 0.2 MG: 1 INJECTION, SOLUTION INTRAMUSCULAR; INTRAVENOUS; SUBCUTANEOUS at 02:36

## 2024-01-01 RX ADMIN — DEXMEDETOMIDINE HYDROCHLORIDE 0.2 MCG/KG/HR: 400 INJECTION INTRAVENOUS at 05:04

## 2024-01-01 RX ADMIN — CEPHALEXIN 500 MG: 250 FOR SUSPENSION ORAL at 09:18

## 2024-01-01 RX ADMIN — ONDANSETRON 4 MG: 2 INJECTION INTRAMUSCULAR; INTRAVENOUS at 03:10

## 2024-01-01 RX ADMIN — ONDANSETRON 4 MG: 2 INJECTION INTRAMUSCULAR; INTRAVENOUS at 21:11

## 2024-01-01 RX ADMIN — MILRINONE LACTATE 0.25 MCG/KG/MIN: 200 INJECTION, SOLUTION INTRAVENOUS at 02:28

## 2024-01-01 RX ADMIN — POLYETHYLENE GLYCOL 3350 17 G: 17 POWDER, FOR SOLUTION ORAL at 20:35

## 2024-01-01 RX ADMIN — INSULIN HUMAN 3 UNITS: 100 INJECTION, SOLUTION PARENTERAL at 20:33

## 2024-01-01 RX ADMIN — DIBASIC SODIUM PHOSPHATE, MONOBASIC POTASSIUM PHOSPHATE AND MONOBASIC SODIUM PHOSPHATE 500 MG: 852; 155; 130 TABLET ORAL at 11:50

## 2024-01-01 RX ADMIN — HYDROMORPHONE HYDROCHLORIDE 0.2 MG: 1 INJECTION, SOLUTION INTRAMUSCULAR; INTRAVENOUS; SUBCUTANEOUS at 02:42

## 2024-01-01 RX ADMIN — LIDOCAINE 1 PATCH: 4 PATCH TOPICAL at 08:20

## 2024-01-01 RX ADMIN — HYDROXYZINE HYDROCHLORIDE 25 MG: 25 TABLET, FILM COATED ORAL at 03:22

## 2024-01-01 RX ADMIN — INSULIN HUMAN 10 UNITS: 100 INJECTION, SUSPENSION SUBCUTANEOUS at 14:09

## 2024-01-01 RX ADMIN — INSULIN LISPRO 2 UNITS: 100 INJECTION, SOLUTION INTRAVENOUS; SUBCUTANEOUS at 17:08

## 2024-01-01 RX ADMIN — ISOSORBIDE DINITRATE 40 MG: 10 TABLET ORAL at 08:42

## 2024-01-01 RX ADMIN — MILRINONE LACTATE 0.25 MCG/KG/MIN: 200 INJECTION, SOLUTION INTRAVENOUS at 12:25

## 2024-01-01 RX ADMIN — Medication 1 TABLET: at 09:26

## 2024-01-01 RX ADMIN — TRAZODONE HYDROCHLORIDE 25 MG: 50 TABLET ORAL at 20:30

## 2024-01-01 RX ADMIN — POLYETHYLENE GLYCOL 3350 17 G: 17 POWDER, FOR SOLUTION ORAL at 08:40

## 2024-01-01 RX ADMIN — HYDRALAZINE HYDROCHLORIDE 10 MG: 20 INJECTION INTRAMUSCULAR; INTRAVENOUS at 22:46

## 2024-01-01 RX ADMIN — CLEVIPIDINE 10 MG/HR: 0.5 EMULSION INTRAVENOUS at 16:54

## 2024-01-01 RX ADMIN — PROMETHAZINE HYDROCHLORIDE 12.5 MG: 25 INJECTION INTRAMUSCULAR; INTRAVENOUS at 15:19

## 2024-01-01 RX ADMIN — SIROLIMUS 3 MG: 2 TABLET ORAL at 06:28

## 2024-01-01 RX ADMIN — ACETAMINOPHEN 975 MG: 325 TABLET ORAL at 16:17

## 2024-01-01 RX ADMIN — SALINE NASAL SPRAY 5 SPRAY: 1.5 SOLUTION NASAL at 12:18

## 2024-01-01 RX ADMIN — MAGNESIUM SULFATE 4 G: 4 INJECTION INTRAVENOUS at 06:44

## 2024-01-01 RX ADMIN — HEPARIN SODIUM 10 ML/HR: 1000 INJECTION INTRAVENOUS; SUBCUTANEOUS at 11:40

## 2024-01-01 RX ADMIN — DIBASIC SODIUM PHOSPHATE, MONOBASIC POTASSIUM PHOSPHATE AND MONOBASIC SODIUM PHOSPHATE 500 MG: 852; 155; 130 TABLET ORAL at 12:20

## 2024-01-01 RX ADMIN — INSULIN HUMAN 5 UNITS/HR: 1 INJECTION, SOLUTION INTRAVENOUS at 11:00

## 2024-01-01 RX ADMIN — ONDANSETRON 4 MG: 2 INJECTION INTRAMUSCULAR; INTRAVENOUS at 07:46

## 2024-01-01 RX ADMIN — ONDANSETRON 4 MG: 2 INJECTION INTRAMUSCULAR; INTRAVENOUS at 09:09

## 2024-01-01 RX ADMIN — MYCOPHENOLIC ACID 360 MG: 180 TABLET, DELAYED RELEASE ORAL at 06:23

## 2024-01-01 RX ADMIN — CALCIUM GLUCONATE 1 G: 20 INJECTION, SOLUTION INTRAVENOUS at 02:36

## 2024-01-01 RX ADMIN — DIGOXIN 125 MCG: 125 TABLET ORAL at 13:10

## 2024-01-01 RX ADMIN — HEPARIN SODIUM 5000 UNITS: 5000 INJECTION INTRAVENOUS; SUBCUTANEOUS at 02:54

## 2024-01-01 RX ADMIN — PREDNISONE 10 MG: 10 TABLET ORAL at 08:49

## 2024-01-01 RX ADMIN — LEVOTHYROXINE SODIUM 200 MCG: 0.2 TABLET ORAL at 06:01

## 2024-01-01 RX ADMIN — Medication 1 TABLET: at 09:06

## 2024-01-01 RX ADMIN — MILRINONE LACTATE 0.38 MCG/KG/MIN: 200 INJECTION, SOLUTION INTRAVENOUS at 02:10

## 2024-01-01 RX ADMIN — TACROLIMUS 2.5 MG: 1 CAPSULE ORAL at 06:23

## 2024-01-01 RX ADMIN — PANTOPRAZOLE SODIUM 40 MG: 40 TABLET, DELAYED RELEASE ORAL at 09:48

## 2024-01-01 RX ADMIN — HYDRALAZINE HYDROCHLORIDE 100 MG: 100 TABLET, FILM COATED ORAL at 16:28

## 2024-01-01 RX ADMIN — HYDRALAZINE HYDROCHLORIDE 100 MG: 100 TABLET, FILM COATED ORAL at 08:21

## 2024-01-01 RX ADMIN — POTASSIUM PHOSPHATE, MONOBASIC POTASSIUM PHOSPHATE, DIBASIC 21 MMOL: 224; 236 INJECTION, SOLUTION, CONCENTRATE INTRAVENOUS at 11:57

## 2024-01-01 RX ADMIN — NYSTATIN 500000 UNITS: 100000 SUSPENSION ORAL at 17:10

## 2024-01-01 RX ADMIN — CALCIUM GLUCONATE 5477 MG: 20 INJECTION, SOLUTION INTRAVENOUS at 10:26

## 2024-01-01 RX ADMIN — SULFAMETHOXAZOLE AND TRIMETHOPRIM 80 MG OF TRIMETHOPRIM: 200; 40 SUSPENSION ORAL at 08:05

## 2024-01-01 RX ADMIN — LEVOTHYROXINE SODIUM 250 MCG: 0.2 TABLET ORAL at 04:05

## 2024-01-01 RX ADMIN — TACROLIMUS 3 MG: 1 CAPSULE ORAL at 18:26

## 2024-01-01 RX ADMIN — HEPARIN SODIUM 800 UNITS/HR: 10000 INJECTION, SOLUTION INTRAVENOUS at 00:50

## 2024-01-01 RX ADMIN — ACETAMINOPHEN 975 MG: 325 TABLET ORAL at 03:35

## 2024-01-01 RX ADMIN — TACROLIMUS 3 MG: 1 CAPSULE ORAL at 06:02

## 2024-01-01 RX ADMIN — OXYCODONE HYDROCHLORIDE 5 MG: 5 TABLET ORAL at 20:48

## 2024-01-01 RX ADMIN — INSULIN HUMAN 3 UNITS: 100 INJECTION, SOLUTION PARENTERAL at 08:32

## 2024-01-01 RX ADMIN — Medication 2 G: at 09:56

## 2024-01-01 RX ADMIN — ROSUVASTATIN 40 MG: 40 TABLET, FILM COATED ORAL at 21:18

## 2024-01-01 RX ADMIN — ONDANSETRON 4 MG: 2 INJECTION INTRAMUSCULAR; INTRAVENOUS at 15:49

## 2024-01-01 RX ADMIN — PREDNISONE 10 MG: 10 TABLET ORAL at 08:03

## 2024-01-01 RX ADMIN — OXYCODONE HYDROCHLORIDE 5 MG: 5 TABLET ORAL at 21:10

## 2024-01-01 RX ADMIN — VANCOMYCIN HYDROCHLORIDE 1500 MG: 5 INJECTION, POWDER, LYOPHILIZED, FOR SOLUTION INTRAVENOUS at 10:32

## 2024-01-01 RX ADMIN — VALGANCICLOVIR 450 MG: 50 FOR SOLUTION ORAL at 09:37

## 2024-01-01 RX ADMIN — PANTOPRAZOLE SODIUM 40 MG: 40 TABLET, DELAYED RELEASE ORAL at 06:11

## 2024-01-01 RX ADMIN — SIROLIMUS 2.5 MG: 1 TABLET ORAL at 06:12

## 2024-01-01 RX ADMIN — MIDAZOLAM HYDROCHLORIDE 2 MG: 1 INJECTION, SOLUTION INTRAMUSCULAR; INTRAVENOUS at 16:51

## 2024-01-01 RX ADMIN — ACETAMINOPHEN 650 MG: 325 TABLET ORAL at 08:18

## 2024-01-01 RX ADMIN — LIDOCAINE 1 PATCH: 4 PATCH TOPICAL at 09:07

## 2024-01-01 RX ADMIN — PROPOFOL 30 MCG/KG/MIN: 10 INJECTION, EMULSION INTRAVENOUS at 12:18

## 2024-01-01 RX ADMIN — HEPARIN SODIUM 5000 UNITS: 5000 INJECTION INTRAVENOUS; SUBCUTANEOUS at 17:26

## 2024-01-01 RX ADMIN — POLYETHYLENE GLYCOL 3350 17 G: 17 POWDER, FOR SOLUTION ORAL at 15:41

## 2024-01-01 RX ADMIN — CALCIUM GLUCONATE 1 G: 20 INJECTION, SOLUTION INTRAVENOUS at 06:58

## 2024-01-01 RX ADMIN — ACETAMINOPHEN 650 MG: 325 TABLET ORAL at 11:31

## 2024-01-01 RX ADMIN — VALGANCICLOVIR HYDROCHLORIDE 450 MG: 450 TABLET ORAL at 15:00

## 2024-01-01 RX ADMIN — POLYETHYLENE GLYCOL 3350 17 G: 17 POWDER, FOR SOLUTION ORAL at 08:19

## 2024-01-01 RX ADMIN — ISOSORBIDE DINITRATE 40 MG: 10 TABLET ORAL at 08:15

## 2024-01-01 RX ADMIN — PREDNISONE 40 MG: 20 TABLET ORAL at 08:47

## 2024-01-01 RX ADMIN — NYSTATIN 500000 UNITS: 100000 SUSPENSION ORAL at 06:43

## 2024-01-01 RX ADMIN — VANCOMYCIN HYDROCHLORIDE 750 MG: 750 INJECTION, SOLUTION INTRAVENOUS at 20:25

## 2024-01-01 RX ADMIN — CALCITRIOL 0.5 MCG: 1 SOLUTION ORAL at 08:29

## 2024-01-01 RX ADMIN — HYDROMORPHONE HYDROCHLORIDE 0.2 MG: 1 INJECTION, SOLUTION INTRAMUSCULAR; INTRAVENOUS; SUBCUTANEOUS at 11:24

## 2024-01-01 RX ADMIN — OXYCODONE HYDROCHLORIDE 5 MG: 5 TABLET ORAL at 18:24

## 2024-01-01 RX ADMIN — POTASSIUM CHLORIDE 40 MEQ: 1500 TABLET, EXTENDED RELEASE ORAL at 09:35

## 2024-01-01 RX ADMIN — OXYCODONE HYDROCHLORIDE 5 MG: 5 TABLET ORAL at 14:50

## 2024-01-01 RX ADMIN — ONDANSETRON 4 MG: 2 INJECTION INTRAMUSCULAR; INTRAVENOUS at 18:17

## 2024-01-01 SDOH — SOCIAL STABILITY: SOCIAL INSECURITY
WITHIN THE LAST YEAR, HAVE YOU BEEN RAPED OR FORCED TO HAVE ANY KIND OF SEXUAL ACTIVITY BY YOUR PARTNER OR EX-PARTNER?: NO

## 2024-01-01 SDOH — SOCIAL STABILITY: SOCIAL INSECURITY
WITHIN THE LAST YEAR, HAVE YOU BEEN KICKED, HIT, SLAPPED, OR OTHERWISE PHYSICALLY HURT BY YOUR PARTNER OR EX-PARTNER?: NO

## 2024-01-01 SDOH — SOCIAL STABILITY: SOCIAL INSECURITY
WITHIN THE LAST YEAR, HAVE TO BEEN RAPED OR FORCED TO HAVE ANY KIND OF SEXUAL ACTIVITY BY YOUR PARTNER OR EX-PARTNER?: NO

## 2024-01-01 SDOH — SOCIAL STABILITY: SOCIAL INSECURITY: HAS ANYONE EVER THREATENED TO HURT YOUR FAMILY OR YOUR PETS?: NO

## 2024-01-01 SDOH — ECONOMIC STABILITY: FOOD INSECURITY: WITHIN THE PAST 12 MONTHS, THE FOOD YOU BOUGHT JUST DIDN'T LAST AND YOU DIDN'T HAVE MONEY TO GET MORE.: NEVER TRUE

## 2024-01-01 SDOH — ECONOMIC STABILITY: FOOD INSECURITY: WITHIN THE PAST 12 MONTHS, YOU WORRIED THAT YOUR FOOD WOULD RUN OUT BEFORE YOU GOT MONEY TO BUY MORE.: NEVER TRUE

## 2024-01-01 SDOH — SOCIAL STABILITY: SOCIAL INSECURITY: WITHIN THE LAST YEAR, HAVE YOU BEEN AFRAID OF YOUR PARTNER OR EX-PARTNER?: NO

## 2024-01-01 SDOH — SOCIAL STABILITY: SOCIAL INSECURITY: WITHIN THE LAST YEAR, HAVE YOU BEEN HUMILIATED OR EMOTIONALLY ABUSED IN OTHER WAYS BY YOUR PARTNER OR EX-PARTNER?: NO

## 2024-01-01 SDOH — SOCIAL STABILITY: SOCIAL INSECURITY: DO YOU FEEL UNSAFE GOING BACK TO THE PLACE WHERE YOU ARE LIVING?: NO

## 2024-01-01 SDOH — SOCIAL STABILITY: SOCIAL INSECURITY: WERE YOU ABLE TO COMPLETE ALL THE BEHAVIORAL HEALTH SCREENINGS?: YES

## 2024-01-01 SDOH — SOCIAL STABILITY: SOCIAL INSECURITY: DO YOU FEEL ANYONE HAS EXPLOITED OR TAKEN ADVANTAGE OF YOU FINANCIALLY OR OF YOUR PERSONAL PROPERTY?: NO

## 2024-01-01 SDOH — SOCIAL STABILITY: SOCIAL INSECURITY: ARE THERE ANY APPARENT SIGNS OF INJURIES/BEHAVIORS THAT COULD BE RELATED TO ABUSE/NEGLECT?: NO

## 2024-01-01 SDOH — ECONOMIC STABILITY: INCOME INSECURITY: IN THE PAST 12 MONTHS HAS THE ELECTRIC, GAS, OIL, OR WATER COMPANY THREATENED TO SHUT OFF SERVICES IN YOUR HOME?: NO

## 2024-01-01 SDOH — ECONOMIC STABILITY: INCOME INSECURITY: IN THE PAST 12 MONTHS, HAS THE ELECTRIC, GAS, OIL, OR WATER COMPANY THREATENED TO SHUT OFF SERVICE IN YOUR HOME?: NO

## 2024-01-01 SDOH — ECONOMIC STABILITY: FOOD INSECURITY: WITHIN THE PAST 12 MONTHS, YOU WORRIED THAT YOUR FOOD WOULD RUN OUT BEFORE YOU GOT THE MONEY TO BUY MORE.: NEVER TRUE

## 2024-01-01 SDOH — SOCIAL STABILITY: SOCIAL INSECURITY: HAVE YOU HAD THOUGHTS OF HARMING ANYONE ELSE?: NO

## 2024-01-01 SDOH — SOCIAL STABILITY: SOCIAL INSECURITY: ABUSE: ADULT

## 2024-01-01 SDOH — SOCIAL STABILITY: SOCIAL INSECURITY: ARE YOU OR HAVE YOU BEEN THREATENED OR ABUSED PHYSICALLY, EMOTIONALLY, OR SEXUALLY BY ANYONE?: NO

## 2024-01-01 SDOH — SOCIAL STABILITY: SOCIAL INSECURITY: DOES ANYONE TRY TO KEEP YOU FROM HAVING/CONTACTING OTHER FRIENDS OR DOING THINGS OUTSIDE YOUR HOME?: NO

## 2024-01-01 SDOH — HEALTH STABILITY: MENTAL HEALTH: CURRENT SMOKER: 0

## 2024-01-01 ASSESSMENT — PAIN - FUNCTIONAL ASSESSMENT
PAIN_FUNCTIONAL_ASSESSMENT: CPOT (CRITICAL CARE PAIN OBSERVATION TOOL)
PAIN_FUNCTIONAL_ASSESSMENT: CPOT (CRITICAL CARE PAIN OBSERVATION TOOL)
PAIN_FUNCTIONAL_ASSESSMENT: 0-10
PAIN_FUNCTIONAL_ASSESSMENT: CPOT (CRITICAL CARE PAIN OBSERVATION TOOL)
PAIN_FUNCTIONAL_ASSESSMENT: 0-10
PAIN_FUNCTIONAL_ASSESSMENT: CPOT (CRITICAL CARE PAIN OBSERVATION TOOL)
PAIN_FUNCTIONAL_ASSESSMENT: 0-10
PAIN_FUNCTIONAL_ASSESSMENT: 0-10
PAIN_FUNCTIONAL_ASSESSMENT: CPOT (CRITICAL CARE PAIN OBSERVATION TOOL)
PAIN_FUNCTIONAL_ASSESSMENT: 0-10
PAIN_FUNCTIONAL_ASSESSMENT: CPOT (CRITICAL CARE PAIN OBSERVATION TOOL)
PAIN_FUNCTIONAL_ASSESSMENT: 0-10
PAIN_FUNCTIONAL_ASSESSMENT: CPOT (CRITICAL CARE PAIN OBSERVATION TOOL)
PAIN_FUNCTIONAL_ASSESSMENT: 0-10
PAIN_FUNCTIONAL_ASSESSMENT: CPOT (CRITICAL CARE PAIN OBSERVATION TOOL)
PAIN_FUNCTIONAL_ASSESSMENT: 0-10
PAIN_FUNCTIONAL_ASSESSMENT: CPOT (CRITICAL CARE PAIN OBSERVATION TOOL)
PAIN_FUNCTIONAL_ASSESSMENT: 0-10
PAIN_FUNCTIONAL_ASSESSMENT: CPOT (CRITICAL CARE PAIN OBSERVATION TOOL)
PAIN_FUNCTIONAL_ASSESSMENT: 0-10
PAIN_FUNCTIONAL_ASSESSMENT: CPOT (CRITICAL CARE PAIN OBSERVATION TOOL)
PAIN_FUNCTIONAL_ASSESSMENT: 0-10
PAIN_FUNCTIONAL_ASSESSMENT: 0-10
PAIN_FUNCTIONAL_ASSESSMENT: CPOT (CRITICAL CARE PAIN OBSERVATION TOOL)
PAIN_FUNCTIONAL_ASSESSMENT: 0-10
PAIN_FUNCTIONAL_ASSESSMENT: CPOT (CRITICAL CARE PAIN OBSERVATION TOOL)
PAIN_FUNCTIONAL_ASSESSMENT: 0-10
PAIN_FUNCTIONAL_ASSESSMENT: CPOT (CRITICAL CARE PAIN OBSERVATION TOOL)
PAIN_FUNCTIONAL_ASSESSMENT: CPOT (CRITICAL CARE PAIN OBSERVATION TOOL)
PAIN_FUNCTIONAL_ASSESSMENT: 0-10
PAIN_FUNCTIONAL_ASSESSMENT: CPOT (CRITICAL CARE PAIN OBSERVATION TOOL)
PAIN_FUNCTIONAL_ASSESSMENT: CPOT (CRITICAL CARE PAIN OBSERVATION TOOL)
PAIN_FUNCTIONAL_ASSESSMENT: 0-10
PAIN_FUNCTIONAL_ASSESSMENT: UNABLE TO SELF-REPORT
PAIN_FUNCTIONAL_ASSESSMENT: 0-10
PAIN_FUNCTIONAL_ASSESSMENT: CPOT (CRITICAL CARE PAIN OBSERVATION TOOL)
PAIN_FUNCTIONAL_ASSESSMENT: 0-10
PAIN_FUNCTIONAL_ASSESSMENT: 0-10
PAIN_FUNCTIONAL_ASSESSMENT: CPOT (CRITICAL CARE PAIN OBSERVATION TOOL)
PAIN_FUNCTIONAL_ASSESSMENT: UNABLE TO SELF-REPORT
PAIN_FUNCTIONAL_ASSESSMENT: 0-10
PAIN_FUNCTIONAL_ASSESSMENT: CPOT (CRITICAL CARE PAIN OBSERVATION TOOL)
PAIN_FUNCTIONAL_ASSESSMENT: 0-10
PAIN_FUNCTIONAL_ASSESSMENT: CPOT (CRITICAL CARE PAIN OBSERVATION TOOL)
PAIN_FUNCTIONAL_ASSESSMENT: 0-10
PAIN_FUNCTIONAL_ASSESSMENT: CPOT (CRITICAL CARE PAIN OBSERVATION TOOL)
PAIN_FUNCTIONAL_ASSESSMENT: 0-10
PAIN_FUNCTIONAL_ASSESSMENT: CPOT (CRITICAL CARE PAIN OBSERVATION TOOL)
PAIN_FUNCTIONAL_ASSESSMENT: 0-10
PAIN_FUNCTIONAL_ASSESSMENT: CPOT (CRITICAL CARE PAIN OBSERVATION TOOL)
PAIN_FUNCTIONAL_ASSESSMENT: 0-10
PAIN_FUNCTIONAL_ASSESSMENT: CPOT (CRITICAL CARE PAIN OBSERVATION TOOL)
PAIN_FUNCTIONAL_ASSESSMENT: 0-10
PAIN_FUNCTIONAL_ASSESSMENT: CPOT (CRITICAL CARE PAIN OBSERVATION TOOL)
PAIN_FUNCTIONAL_ASSESSMENT: 0-10
PAIN_FUNCTIONAL_ASSESSMENT: UNABLE TO SELF-REPORT
PAIN_FUNCTIONAL_ASSESSMENT: 0-10
PAIN_FUNCTIONAL_ASSESSMENT: CPOT (CRITICAL CARE PAIN OBSERVATION TOOL)
PAIN_FUNCTIONAL_ASSESSMENT: 0-10
PAIN_FUNCTIONAL_ASSESSMENT: CPOT (CRITICAL CARE PAIN OBSERVATION TOOL)
PAIN_FUNCTIONAL_ASSESSMENT: 0-10
PAIN_FUNCTIONAL_ASSESSMENT: CPOT (CRITICAL CARE PAIN OBSERVATION TOOL)
PAIN_FUNCTIONAL_ASSESSMENT: 0-10
PAIN_FUNCTIONAL_ASSESSMENT: CPOT (CRITICAL CARE PAIN OBSERVATION TOOL)
PAIN_FUNCTIONAL_ASSESSMENT: 0-10
PAIN_FUNCTIONAL_ASSESSMENT: CPOT (CRITICAL CARE PAIN OBSERVATION TOOL)
PAIN_FUNCTIONAL_ASSESSMENT: 0-10
PAIN_FUNCTIONAL_ASSESSMENT: CPOT (CRITICAL CARE PAIN OBSERVATION TOOL)
PAIN_FUNCTIONAL_ASSESSMENT: 0-10
PAIN_FUNCTIONAL_ASSESSMENT: CPOT (CRITICAL CARE PAIN OBSERVATION TOOL)
PAIN_FUNCTIONAL_ASSESSMENT: 0-10
PAIN_FUNCTIONAL_ASSESSMENT: CPOT (CRITICAL CARE PAIN OBSERVATION TOOL)
PAIN_FUNCTIONAL_ASSESSMENT: 0-10
PAIN_FUNCTIONAL_ASSESSMENT: CPOT (CRITICAL CARE PAIN OBSERVATION TOOL)
PAIN_FUNCTIONAL_ASSESSMENT: CPOT (CRITICAL CARE PAIN OBSERVATION TOOL)
PAIN_FUNCTIONAL_ASSESSMENT: 0-10
PAIN_FUNCTIONAL_ASSESSMENT: CPOT (CRITICAL CARE PAIN OBSERVATION TOOL)
PAIN_FUNCTIONAL_ASSESSMENT: 0-10
PAIN_FUNCTIONAL_ASSESSMENT: CPOT (CRITICAL CARE PAIN OBSERVATION TOOL)
PAIN_FUNCTIONAL_ASSESSMENT: 0-10
PAIN_FUNCTIONAL_ASSESSMENT: CPOT (CRITICAL CARE PAIN OBSERVATION TOOL)
PAIN_FUNCTIONAL_ASSESSMENT: 0-10
PAIN_FUNCTIONAL_ASSESSMENT: CPOT (CRITICAL CARE PAIN OBSERVATION TOOL)
PAIN_FUNCTIONAL_ASSESSMENT: 0-10
PAIN_FUNCTIONAL_ASSESSMENT: 0-10
PAIN_FUNCTIONAL_ASSESSMENT: UNABLE TO SELF-REPORT
PAIN_FUNCTIONAL_ASSESSMENT: 0-10
PAIN_FUNCTIONAL_ASSESSMENT: CPOT (CRITICAL CARE PAIN OBSERVATION TOOL)
PAIN_FUNCTIONAL_ASSESSMENT: 0-10
PAIN_FUNCTIONAL_ASSESSMENT: CPOT (CRITICAL CARE PAIN OBSERVATION TOOL)
PAIN_FUNCTIONAL_ASSESSMENT: 0-10
PAIN_FUNCTIONAL_ASSESSMENT: CPOT (CRITICAL CARE PAIN OBSERVATION TOOL)
PAIN_FUNCTIONAL_ASSESSMENT: 0-10
PAIN_FUNCTIONAL_ASSESSMENT: CPOT (CRITICAL CARE PAIN OBSERVATION TOOL)
PAIN_FUNCTIONAL_ASSESSMENT: CPOT (CRITICAL CARE PAIN OBSERVATION TOOL)
PAIN_FUNCTIONAL_ASSESSMENT: 0-10
PAIN_FUNCTIONAL_ASSESSMENT: CPOT (CRITICAL CARE PAIN OBSERVATION TOOL)
PAIN_FUNCTIONAL_ASSESSMENT: CPOT (CRITICAL CARE PAIN OBSERVATION TOOL)
PAIN_FUNCTIONAL_ASSESSMENT: 0-10
PAIN_FUNCTIONAL_ASSESSMENT: CPOT (CRITICAL CARE PAIN OBSERVATION TOOL)
PAIN_FUNCTIONAL_ASSESSMENT: 0-10
PAIN_FUNCTIONAL_ASSESSMENT: CPOT (CRITICAL CARE PAIN OBSERVATION TOOL)
PAIN_FUNCTIONAL_ASSESSMENT: 0-10
PAIN_FUNCTIONAL_ASSESSMENT: 0-10
PAIN_FUNCTIONAL_ASSESSMENT: CPOT (CRITICAL CARE PAIN OBSERVATION TOOL)
PAIN_FUNCTIONAL_ASSESSMENT: 0-10
PAIN_FUNCTIONAL_ASSESSMENT: CPOT (CRITICAL CARE PAIN OBSERVATION TOOL)
PAIN_FUNCTIONAL_ASSESSMENT: 0-10
PAIN_FUNCTIONAL_ASSESSMENT: CPOT (CRITICAL CARE PAIN OBSERVATION TOOL)
PAIN_FUNCTIONAL_ASSESSMENT: 0-10
PAIN_FUNCTIONAL_ASSESSMENT: CPOT (CRITICAL CARE PAIN OBSERVATION TOOL)
PAIN_FUNCTIONAL_ASSESSMENT: 0-10
PAIN_FUNCTIONAL_ASSESSMENT: CPOT (CRITICAL CARE PAIN OBSERVATION TOOL)
PAIN_FUNCTIONAL_ASSESSMENT: CPOT (CRITICAL CARE PAIN OBSERVATION TOOL)
PAIN_FUNCTIONAL_ASSESSMENT: 0-10
PAIN_FUNCTIONAL_ASSESSMENT: CPOT (CRITICAL CARE PAIN OBSERVATION TOOL)
PAIN_FUNCTIONAL_ASSESSMENT: 0-10
PAIN_FUNCTIONAL_ASSESSMENT: CPOT (CRITICAL CARE PAIN OBSERVATION TOOL)
PAIN_FUNCTIONAL_ASSESSMENT: CPOT (CRITICAL CARE PAIN OBSERVATION TOOL)
PAIN_FUNCTIONAL_ASSESSMENT: 0-10
PAIN_FUNCTIONAL_ASSESSMENT: CPOT (CRITICAL CARE PAIN OBSERVATION TOOL)
PAIN_FUNCTIONAL_ASSESSMENT: 0-10
PAIN_FUNCTIONAL_ASSESSMENT: CPOT (CRITICAL CARE PAIN OBSERVATION TOOL)
PAIN_FUNCTIONAL_ASSESSMENT: 0-10
PAIN_FUNCTIONAL_ASSESSMENT: CPOT (CRITICAL CARE PAIN OBSERVATION TOOL)
PAIN_FUNCTIONAL_ASSESSMENT: 0-10
PAIN_FUNCTIONAL_ASSESSMENT: CPOT (CRITICAL CARE PAIN OBSERVATION TOOL)
PAIN_FUNCTIONAL_ASSESSMENT: 0-10
PAIN_FUNCTIONAL_ASSESSMENT: CPOT (CRITICAL CARE PAIN OBSERVATION TOOL)
PAIN_FUNCTIONAL_ASSESSMENT: 0-10
PAIN_FUNCTIONAL_ASSESSMENT: UNABLE TO SELF-REPORT
PAIN_FUNCTIONAL_ASSESSMENT: 0-10
PAIN_FUNCTIONAL_ASSESSMENT: CPOT (CRITICAL CARE PAIN OBSERVATION TOOL)
PAIN_FUNCTIONAL_ASSESSMENT: 0-10
PAIN_FUNCTIONAL_ASSESSMENT: CPOT (CRITICAL CARE PAIN OBSERVATION TOOL)
PAIN_FUNCTIONAL_ASSESSMENT: 0-10

## 2024-01-01 ASSESSMENT — COGNITIVE AND FUNCTIONAL STATUS - GENERAL
TOILETING: A LITTLE
MOBILITY SCORE: 7
MOVING FROM LYING ON BACK TO SITTING ON SIDE OF FLAT BED WITH BEDRAILS: A LITTLE
MOVING FROM LYING ON BACK TO SITTING ON SIDE OF FLAT BED WITH BEDRAILS: A LOT
EATING MEALS: A LITTLE
MOVING FROM LYING ON BACK TO SITTING ON SIDE OF FLAT BED WITH BEDRAILS: A LOT
HELP NEEDED FOR BATHING: A LOT
DAILY ACTIVITIY SCORE: 18
CLIMB 3 TO 5 STEPS WITH RAILING: TOTAL
TURNING FROM BACK TO SIDE WHILE IN FLAT BAD: A LOT
STANDING UP FROM CHAIR USING ARMS: A LITTLE
WALKING IN HOSPITAL ROOM: TOTAL
MOVING FROM LYING ON BACK TO SITTING ON SIDE OF FLAT BED WITH BEDRAILS: TOTAL
MOBILITY SCORE: 20
TURNING FROM BACK TO SIDE WHILE IN FLAT BAD: A LOT
DRESSING REGULAR LOWER BODY CLOTHING: A LOT
STANDING UP FROM CHAIR USING ARMS: A LITTLE
EATING MEALS: A LITTLE
CLIMB 3 TO 5 STEPS WITH RAILING: A LITTLE
DRESSING REGULAR LOWER BODY CLOTHING: TOTAL
TOILETING: A LOT
EATING MEALS: A LITTLE
DAILY ACTIVITIY SCORE: 12
EATING MEALS: A LITTLE
MOVING FROM LYING ON BACK TO SITTING ON SIDE OF FLAT BED WITH BEDRAILS: A LITTLE
TOILETING: TOTAL
MOVING FROM LYING ON BACK TO SITTING ON SIDE OF FLAT BED WITH BEDRAILS: A LOT
HELP NEEDED FOR BATHING: A LOT
DAILY ACTIVITIY SCORE: 18
WALKING IN HOSPITAL ROOM: A LOT
MOVING TO AND FROM BED TO CHAIR: TOTAL
DAILY ACTIVITIY SCORE: 12
HELP NEEDED FOR BATHING: A LOT
MOVING FROM LYING ON BACK TO SITTING ON SIDE OF FLAT BED WITH BEDRAILS: A LOT
PERSONAL GROOMING: A LITTLE
EATING MEALS: A LITTLE
WALKING IN HOSPITAL ROOM: A LOT
EATING MEALS: A LITTLE
MOBILITY SCORE: 19
TURNING FROM BACK TO SIDE WHILE IN FLAT BAD: A LOT
TOILETING: TOTAL
WALKING IN HOSPITAL ROOM: A LITTLE
DAILY ACTIVITIY SCORE: 17
HELP NEEDED FOR BATHING: A LOT
STANDING UP FROM CHAIR USING ARMS: A LOT
DRESSING REGULAR UPPER BODY CLOTHING: A LITTLE
MOVING FROM LYING ON BACK TO SITTING ON SIDE OF FLAT BED WITH BEDRAILS: A LOT
STANDING UP FROM CHAIR USING ARMS: A LOT
MOVING FROM LYING ON BACK TO SITTING ON SIDE OF FLAT BED WITH BEDRAILS: A LOT
PERSONAL GROOMING: A LITTLE
MOBILITY SCORE: 8
PERSONAL GROOMING: A LITTLE
DRESSING REGULAR UPPER BODY CLOTHING: A LOT
STANDING UP FROM CHAIR USING ARMS: A LOT
DRESSING REGULAR LOWER BODY CLOTHING: A LOT
MOBILITY SCORE: 9
DAILY ACTIVITIY SCORE: 15
TURNING FROM BACK TO SIDE WHILE IN FLAT BAD: A LITTLE
TURNING FROM BACK TO SIDE WHILE IN FLAT BAD: A LOT
MOBILITY SCORE: 11
TURNING FROM BACK TO SIDE WHILE IN FLAT BAD: A LOT
TURNING FROM BACK TO SIDE WHILE IN FLAT BAD: A LOT
STANDING UP FROM CHAIR USING ARMS: A LOT
DAILY ACTIVITIY SCORE: 15
PERSONAL GROOMING: A LITTLE
DRESSING REGULAR UPPER BODY CLOTHING: A LITTLE
PERSONAL GROOMING: A LITTLE
CLIMB 3 TO 5 STEPS WITH RAILING: A LOT
TURNING FROM BACK TO SIDE WHILE IN FLAT BAD: A LOT
MOVING TO AND FROM BED TO CHAIR: A LITTLE
PERSONAL GROOMING: A LITTLE
MOBILITY SCORE: 16
DRESSING REGULAR UPPER BODY CLOTHING: A LOT
MOVING TO AND FROM BED TO CHAIR: A LITTLE
MOVING TO AND FROM BED TO CHAIR: A LOT
DRESSING REGULAR UPPER BODY CLOTHING: A LITTLE
TURNING FROM BACK TO SIDE WHILE IN FLAT BAD: TOTAL
CLIMB 3 TO 5 STEPS WITH RAILING: TOTAL
MOVING TO AND FROM BED TO CHAIR: A LITTLE
TOILETING: A LOT
HELP NEEDED FOR BATHING: A LITTLE
CLIMB 3 TO 5 STEPS WITH RAILING: A LOT
HELP NEEDED FOR BATHING: A LOT
CLIMB 3 TO 5 STEPS WITH RAILING: TOTAL
PERSONAL GROOMING: A LITTLE
DRESSING REGULAR UPPER BODY CLOTHING: A LITTLE
TOILETING: A LOT
DRESSING REGULAR UPPER BODY CLOTHING: A LITTLE
STANDING UP FROM CHAIR USING ARMS: A LOT
MOVING TO AND FROM BED TO CHAIR: A LOT
MOVING FROM LYING ON BACK TO SITTING ON SIDE OF FLAT BED WITH BEDRAILS: TOTAL
CLIMB 3 TO 5 STEPS WITH RAILING: A LITTLE
DRESSING REGULAR LOWER BODY CLOTHING: TOTAL
STANDING UP FROM CHAIR USING ARMS: TOTAL
EATING MEALS: A LITTLE
CLIMB 3 TO 5 STEPS WITH RAILING: A LOT
DAILY ACTIVITIY SCORE: 12
PATIENT BASELINE BEDBOUND: NO
DRESSING REGULAR LOWER BODY CLOTHING: TOTAL
MOVING TO AND FROM BED TO CHAIR: A LOT
DAILY ACTIVITIY SCORE: 19
CLIMB 3 TO 5 STEPS WITH RAILING: A LITTLE
DRESSING REGULAR UPPER BODY CLOTHING: A LITTLE
CLIMB 3 TO 5 STEPS WITH RAILING: TOTAL
MOVING TO AND FROM BED TO CHAIR: A LOT
TOILETING: TOTAL
PERSONAL GROOMING: A LITTLE
MOBILITY SCORE: 10
DRESSING REGULAR LOWER BODY CLOTHING: TOTAL
TOILETING: TOTAL
DRESSING REGULAR UPPER BODY CLOTHING: A LOT
PERSONAL GROOMING: A LITTLE
MOVING TO AND FROM BED TO CHAIR: A LITTLE
MOBILITY SCORE: 21
STANDING UP FROM CHAIR USING ARMS: A LITTLE
MOVING FROM LYING ON BACK TO SITTING ON SIDE OF FLAT BED WITH BEDRAILS: A LOT
WALKING IN HOSPITAL ROOM: A LITTLE
WALKING IN HOSPITAL ROOM: A LITTLE
MOBILITY SCORE: 11
TURNING FROM BACK TO SIDE WHILE IN FLAT BAD: A LOT
WALKING IN HOSPITAL ROOM: A LITTLE
TOILETING: A LITTLE
WALKING IN HOSPITAL ROOM: TOTAL
DRESSING REGULAR UPPER BODY CLOTHING: A LOT
STANDING UP FROM CHAIR USING ARMS: A LITTLE
MOBILITY SCORE: 20
HELP NEEDED FOR BATHING: A LITTLE
WALKING IN HOSPITAL ROOM: A LITTLE
MOBILITY SCORE: 14
MOVING TO AND FROM BED TO CHAIR: TOTAL
MOBILITY SCORE: 10
MOVING TO AND FROM BED TO CHAIR: TOTAL
EATING MEALS: A LITTLE
CLIMB 3 TO 5 STEPS WITH RAILING: A LITTLE
MOVING TO AND FROM BED TO CHAIR: A LITTLE
PERSONAL GROOMING: A LITTLE
DRESSING REGULAR UPPER BODY CLOTHING: A LITTLE
MOBILITY SCORE: 6
CLIMB 3 TO 5 STEPS WITH RAILING: A LITTLE
MOVING TO AND FROM BED TO CHAIR: TOTAL
STANDING UP FROM CHAIR USING ARMS: A LITTLE
CLIMB 3 TO 5 STEPS WITH RAILING: TOTAL
STANDING UP FROM CHAIR USING ARMS: A LOT
MOVING TO AND FROM BED TO CHAIR: A LITTLE
CLIMB 3 TO 5 STEPS WITH RAILING: TOTAL
DRESSING REGULAR UPPER BODY CLOTHING: A LOT
CLIMB 3 TO 5 STEPS WITH RAILING: TOTAL
CLIMB 3 TO 5 STEPS WITH RAILING: A LITTLE
MOBILITY SCORE: 17
PERSONAL GROOMING: A LITTLE
WALKING IN HOSPITAL ROOM: A LITTLE
STANDING UP FROM CHAIR USING ARMS: A LOT
CLIMB 3 TO 5 STEPS WITH RAILING: A LITTLE
STANDING UP FROM CHAIR USING ARMS: A LOT
DRESSING REGULAR LOWER BODY CLOTHING: TOTAL
DRESSING REGULAR LOWER BODY CLOTHING: A LOT
WALKING IN HOSPITAL ROOM: TOTAL
MOVING FROM LYING ON BACK TO SITTING ON SIDE OF FLAT BED WITH BEDRAILS: A LOT
STANDING UP FROM CHAIR USING ARMS: A LOT
MOVING TO AND FROM BED TO CHAIR: A LITTLE
HELP NEEDED FOR BATHING: A LITTLE
HELP NEEDED FOR BATHING: A LITTLE
PERSONAL GROOMING: A LITTLE
DAILY ACTIVITIY SCORE: 12
DRESSING REGULAR UPPER BODY CLOTHING: A LITTLE
TURNING FROM BACK TO SIDE WHILE IN FLAT BAD: A LOT
DAILY ACTIVITIY SCORE: 13
DAILY ACTIVITIY SCORE: 16
MOBILITY SCORE: 14
PERSONAL GROOMING: A LITTLE
HELP NEEDED FOR BATHING: A LOT
DRESSING REGULAR LOWER BODY CLOTHING: A LOT
STANDING UP FROM CHAIR USING ARMS: A LOT
WALKING IN HOSPITAL ROOM: TOTAL
MOVING TO AND FROM BED TO CHAIR: A LITTLE
CLIMB 3 TO 5 STEPS WITH RAILING: TOTAL
CLIMB 3 TO 5 STEPS WITH RAILING: A LITTLE
DAILY ACTIVITIY SCORE: 16
STANDING UP FROM CHAIR USING ARMS: A LITTLE
CLIMB 3 TO 5 STEPS WITH RAILING: TOTAL
MOVING TO AND FROM BED TO CHAIR: A LOT
TOILETING: TOTAL
DRESSING REGULAR UPPER BODY CLOTHING: A LITTLE
MOVING FROM LYING ON BACK TO SITTING ON SIDE OF FLAT BED WITH BEDRAILS: A LOT
TOILETING: TOTAL
MOBILITY SCORE: 19
TURNING FROM BACK TO SIDE WHILE IN FLAT BAD: TOTAL
TURNING FROM BACK TO SIDE WHILE IN FLAT BAD: A LITTLE
STANDING UP FROM CHAIR USING ARMS: A LOT
MOVING FROM LYING ON BACK TO SITTING ON SIDE OF FLAT BED WITH BEDRAILS: A LOT
TURNING FROM BACK TO SIDE WHILE IN FLAT BAD: A LITTLE
HELP NEEDED FOR BATHING: A LOT
MOBILITY SCORE: 20
MOVING TO AND FROM BED TO CHAIR: A LITTLE
DAILY ACTIVITIY SCORE: 13
STANDING UP FROM CHAIR USING ARMS: A LITTLE
PERSONAL GROOMING: A LITTLE
TOILETING: TOTAL
DAILY ACTIVITIY SCORE: 12
TURNING FROM BACK TO SIDE WHILE IN FLAT BAD: A LITTLE
TOILETING: TOTAL
HELP NEEDED FOR BATHING: A LOT
DRESSING REGULAR LOWER BODY CLOTHING: A LOT
MOBILITY SCORE: 16
STANDING UP FROM CHAIR USING ARMS: A LITTLE
MOBILITY SCORE: 18
WALKING IN HOSPITAL ROOM: TOTAL
DAILY ACTIVITIY SCORE: 12
WALKING IN HOSPITAL ROOM: A LITTLE
TOILETING: A LITTLE
TURNING FROM BACK TO SIDE WHILE IN FLAT BAD: A LITTLE
MOVING TO AND FROM BED TO CHAIR: TOTAL
WALKING IN HOSPITAL ROOM: A LITTLE
DAILY ACTIVITIY SCORE: 19
HELP NEEDED FOR BATHING: A LOT
HELP NEEDED FOR BATHING: A LOT
TOILETING: A LITTLE
DRESSING REGULAR LOWER BODY CLOTHING: A LITTLE
HELP NEEDED FOR BATHING: A LOT
CLIMB 3 TO 5 STEPS WITH RAILING: A LITTLE
MOVING FROM LYING ON BACK TO SITTING ON SIDE OF FLAT BED WITH BEDRAILS: A LOT
EATING MEALS: A LITTLE
WALKING IN HOSPITAL ROOM: A LITTLE
HELP NEEDED FOR BATHING: A LOT
TURNING FROM BACK TO SIDE WHILE IN FLAT BAD: A LITTLE
EATING MEALS: A LITTLE
DRESSING REGULAR UPPER BODY CLOTHING: A LOT
PERSONAL GROOMING: A LITTLE
HELP NEEDED FOR BATHING: A LOT
STANDING UP FROM CHAIR USING ARMS: TOTAL
PERSONAL GROOMING: A LITTLE
DRESSING REGULAR LOWER BODY CLOTHING: A LITTLE
DRESSING REGULAR UPPER BODY CLOTHING: A LOT
WALKING IN HOSPITAL ROOM: A LITTLE
TOILETING: TOTAL
TURNING FROM BACK TO SIDE WHILE IN FLAT BAD: A LITTLE
HELP NEEDED FOR BATHING: A LITTLE
MOBILITY SCORE: 13
WALKING IN HOSPITAL ROOM: TOTAL
PERSONAL GROOMING: A LITTLE
STANDING UP FROM CHAIR USING ARMS: A LITTLE
CLIMB 3 TO 5 STEPS WITH RAILING: A LOT
CLIMB 3 TO 5 STEPS WITH RAILING: TOTAL
EATING MEALS: A LITTLE
DRESSING REGULAR UPPER BODY CLOTHING: A LOT
TOILETING: TOTAL
MOVING TO AND FROM BED TO CHAIR: A LOT
TOILETING: A LITTLE
TOILETING: TOTAL
DRESSING REGULAR UPPER BODY CLOTHING: A LITTLE
DRESSING REGULAR LOWER BODY CLOTHING: A LITTLE
WALKING IN HOSPITAL ROOM: TOTAL
TOILETING: A LOT
STANDING UP FROM CHAIR USING ARMS: A LITTLE
HELP NEEDED FOR BATHING: A LOT
DRESSING REGULAR LOWER BODY CLOTHING: TOTAL
MOVING TO AND FROM BED TO CHAIR: A LOT
HELP NEEDED FOR BATHING: A LITTLE
PERSONAL GROOMING: A LITTLE
PERSONAL GROOMING: A LITTLE
MOVING TO AND FROM BED TO CHAIR: A LITTLE
DRESSING REGULAR LOWER BODY CLOTHING: TOTAL
TOILETING: A LOT
DRESSING REGULAR LOWER BODY CLOTHING: A LOT
WALKING IN HOSPITAL ROOM: A LITTLE
TOILETING: TOTAL
DAILY ACTIVITIY SCORE: 12
DRESSING REGULAR UPPER BODY CLOTHING: A LITTLE
MOBILITY SCORE: 9
MOVING TO AND FROM BED TO CHAIR: A LITTLE
DAILY ACTIVITIY SCORE: 20
TURNING FROM BACK TO SIDE WHILE IN FLAT BAD: A LOT
DAILY ACTIVITIY SCORE: 19
MOBILITY SCORE: 20
HELP NEEDED FOR BATHING: A LOT
PERSONAL GROOMING: A LITTLE
HELP NEEDED FOR BATHING: A LITTLE
MOBILITY SCORE: 19
MOVING FROM LYING ON BACK TO SITTING ON SIDE OF FLAT BED WITH BEDRAILS: A LITTLE
DRESSING REGULAR LOWER BODY CLOTHING: A LITTLE
STANDING UP FROM CHAIR USING ARMS: A LOT
HELP NEEDED FOR BATHING: A LOT
TOILETING: A LITTLE
DRESSING REGULAR LOWER BODY CLOTHING: TOTAL
EATING MEALS: A LITTLE
DRESSING REGULAR LOWER BODY CLOTHING: TOTAL
MOBILITY SCORE: 15
DRESSING REGULAR LOWER BODY CLOTHING: TOTAL
CLIMB 3 TO 5 STEPS WITH RAILING: A LITTLE
MOVING TO AND FROM BED TO CHAIR: A LOT
MOBILITY SCORE: 10
STANDING UP FROM CHAIR USING ARMS: A LOT
TURNING FROM BACK TO SIDE WHILE IN FLAT BAD: A LITTLE
DAILY ACTIVITIY SCORE: 13
WALKING IN HOSPITAL ROOM: TOTAL
MOBILITY SCORE: 18
TURNING FROM BACK TO SIDE WHILE IN FLAT BAD: A LOT
DAILY ACTIVITIY SCORE: 19
HELP NEEDED FOR BATHING: A LITTLE
DRESSING REGULAR LOWER BODY CLOTHING: A LITTLE
WALKING IN HOSPITAL ROOM: A LITTLE
TURNING FROM BACK TO SIDE WHILE IN FLAT BAD: A LITTLE
DRESSING REGULAR LOWER BODY CLOTHING: TOTAL
MOVING TO AND FROM BED TO CHAIR: TOTAL
WALKING IN HOSPITAL ROOM: TOTAL
STANDING UP FROM CHAIR USING ARMS: A LOT
WALKING IN HOSPITAL ROOM: A LITTLE
PERSONAL GROOMING: A LITTLE
MOBILITY SCORE: 9
DRESSING REGULAR UPPER BODY CLOTHING: A LOT
STANDING UP FROM CHAIR USING ARMS: A LOT
CLIMB 3 TO 5 STEPS WITH RAILING: TOTAL
DRESSING REGULAR UPPER BODY CLOTHING: A LOT
CLIMB 3 TO 5 STEPS WITH RAILING: TOTAL
WALKING IN HOSPITAL ROOM: A LITTLE
DAILY ACTIVITIY SCORE: 13
DRESSING REGULAR LOWER BODY CLOTHING: A LOT
DRESSING REGULAR UPPER BODY CLOTHING: A LITTLE
TURNING FROM BACK TO SIDE WHILE IN FLAT BAD: A LOT
WALKING IN HOSPITAL ROOM: A LITTLE
WALKING IN HOSPITAL ROOM: A LITTLE
STANDING UP FROM CHAIR USING ARMS: A LOT
EATING MEALS: A LITTLE
MOBILITY SCORE: 20
DRESSING REGULAR UPPER BODY CLOTHING: A LOT
CLIMB 3 TO 5 STEPS WITH RAILING: TOTAL
DAILY ACTIVITIY SCORE: 24
MOVING FROM LYING ON BACK TO SITTING ON SIDE OF FLAT BED WITH BEDRAILS: A LITTLE
MOVING FROM LYING ON BACK TO SITTING ON SIDE OF FLAT BED WITH BEDRAILS: A LOT
HELP NEEDED FOR BATHING: A LOT
DRESSING REGULAR LOWER BODY CLOTHING: A LOT
WALKING IN HOSPITAL ROOM: A LOT
STANDING UP FROM CHAIR USING ARMS: A LITTLE
DRESSING REGULAR UPPER BODY CLOTHING: A LITTLE
TOILETING: TOTAL
EATING MEALS: A LITTLE
DRESSING REGULAR UPPER BODY CLOTHING: A LOT
CLIMB 3 TO 5 STEPS WITH RAILING: TOTAL
MOVING TO AND FROM BED TO CHAIR: A LITTLE
MOVING TO AND FROM BED TO CHAIR: A LOT
MOBILITY SCORE: 9
CLIMB 3 TO 5 STEPS WITH RAILING: TOTAL
WALKING IN HOSPITAL ROOM: TOTAL
DRESSING REGULAR UPPER BODY CLOTHING: A LOT
DRESSING REGULAR LOWER BODY CLOTHING: A LOT
DAILY ACTIVITIY SCORE: 13
DAILY ACTIVITIY SCORE: 13
HELP NEEDED FOR BATHING: A LOT
MOBILITY SCORE: 11
STANDING UP FROM CHAIR USING ARMS: A LITTLE
MOVING TO AND FROM BED TO CHAIR: A LITTLE
MOVING FROM LYING ON BACK TO SITTING ON SIDE OF FLAT BED WITH BEDRAILS: A LOT
CLIMB 3 TO 5 STEPS WITH RAILING: TOTAL
MOVING TO AND FROM BED TO CHAIR: A LOT
DRESSING REGULAR LOWER BODY CLOTHING: A LITTLE
WALKING IN HOSPITAL ROOM: A LITTLE
TURNING FROM BACK TO SIDE WHILE IN FLAT BAD: A LOT
MOVING TO AND FROM BED TO CHAIR: TOTAL
WALKING IN HOSPITAL ROOM: A LITTLE
STANDING UP FROM CHAIR USING ARMS: A LITTLE
TOILETING: A LITTLE
TOILETING: TOTAL
CLIMB 3 TO 5 STEPS WITH RAILING: TOTAL
DAILY ACTIVITIY SCORE: 17

## 2024-01-01 ASSESSMENT — PAIN SCALES - GENERAL
PAINLEVEL_OUTOF10: 10 - WORST POSSIBLE PAIN
PAINLEVEL_OUTOF10: 0 - NO PAIN
PAINLEVEL_OUTOF10: 3
PAINLEVEL_OUTOF10: 8
PAINLEVEL_OUTOF10: 0 - NO PAIN
PAINLEVEL_OUTOF10: 5 - MODERATE PAIN
PAINLEVEL_OUTOF10: 9
PAINLEVEL_OUTOF10: 0 - NO PAIN
PAINLEVEL_OUTOF10: 0 - NO PAIN
PAINLEVEL_OUTOF10: 9
PAINLEVEL_OUTOF10: 2
PAINLEVEL_OUTOF10: 3
PAINLEVEL_OUTOF10: 0 - NO PAIN
PAINLEVEL_OUTOF10: 5 - MODERATE PAIN
PAINLEVEL_OUTOF10: 0 - NO PAIN
PAINLEVEL_OUTOF10: 4
PAINLEVEL_OUTOF10: 0 - NO PAIN
PAINLEVEL_OUTOF10: 0 - NO PAIN
PAINLEVEL_OUTOF10: 10 - WORST POSSIBLE PAIN
PAINLEVEL_OUTOF10: 5 - MODERATE PAIN
PAINLEVEL_OUTOF10: 0 - NO PAIN
PAINLEVEL_OUTOF10: 0 - NO PAIN
PAINLEVEL_OUTOF10: 7
PAINLEVEL_OUTOF10: 0 - NO PAIN
PAINLEVEL_OUTOF10: 2
PAINLEVEL_OUTOF10: 5 - MODERATE PAIN
PAINLEVEL_OUTOF10: 4
PAINLEVEL_OUTOF10: 0 - NO PAIN
PAINLEVEL_OUTOF10: 8
PAINLEVEL_OUTOF10: 0 - NO PAIN
PAINLEVEL_OUTOF10: 5 - MODERATE PAIN
PAINLEVEL_OUTOF10: 9
PAINLEVEL_OUTOF10: 0 - NO PAIN
PAINLEVEL_OUTOF10: 2
PAINLEVEL_OUTOF10: 0 - NO PAIN
PAINLEVEL_OUTOF10: 6
PAINLEVEL_OUTOF10: 0 - NO PAIN
PAINLEVEL_OUTOF10: 0 - NO PAIN
PAINLEVEL_OUTOF10: 8
PAINLEVEL_OUTOF10: 7
PAINLEVEL_OUTOF10: 10 - WORST POSSIBLE PAIN
PAINLEVEL_OUTOF10: 0 - NO PAIN
PAINLEVEL_OUTOF10: 7
PAINLEVEL_OUTOF10: 6
PAINLEVEL_OUTOF10: 4
PAINLEVEL_OUTOF10: 0 - NO PAIN
PAINLEVEL_OUTOF10: 0 - NO PAIN
PAINLEVEL_OUTOF10: 7
PAINLEVEL_OUTOF10: 3
PAINLEVEL_OUTOF10: 0 - NO PAIN
PAINLEVEL_OUTOF10: 4
PAINLEVEL_OUTOF10: 6
PAINLEVEL_OUTOF10: 0 - NO PAIN
PAINLEVEL_OUTOF10: 7
PAINLEVEL_OUTOF10: 8
PAINLEVEL_OUTOF10: 2
PAINLEVEL_OUTOF10: 7
PAINLEVEL_OUTOF10: 0 - NO PAIN
PAINLEVEL_OUTOF10: 9
PAINLEVEL_OUTOF10: 3
PAINLEVEL_OUTOF10: 7
PAINLEVEL_OUTOF10: 0 - NO PAIN
PAINLEVEL_OUTOF10: 8
PAINLEVEL_OUTOF10: 0 - NO PAIN
PAINLEVEL_OUTOF10: 8
PAINLEVEL_OUTOF10: 9
PAINLEVEL_OUTOF10: 9
PAINLEVEL_OUTOF10: 6
PAINLEVEL_OUTOF10: 2
PAINLEVEL_OUTOF10: 3
PAINLEVEL_OUTOF10: 0 - NO PAIN
PAINLEVEL_OUTOF10: 8
PAINLEVEL_OUTOF10: 0 - NO PAIN
PAINLEVEL_OUTOF10: 6
PAINLEVEL_OUTOF10: 0 - NO PAIN
PAINLEVEL_OUTOF10: 7
PAINLEVEL_OUTOF10: 0 - NO PAIN
PAINLEVEL_OUTOF10: 8
PAINLEVEL_OUTOF10: 0 - NO PAIN
PAINLEVEL_OUTOF10: 3
PAINLEVEL_OUTOF10: 8
PAINLEVEL_OUTOF10: 0 - NO PAIN
PAINLEVEL_OUTOF10: 7
PAINLEVEL_OUTOF10: 0 - NO PAIN
PAINLEVEL_OUTOF10: 0 - NO PAIN
PAINLEVEL_OUTOF10: 4
PAINLEVEL_OUTOF10: 7
PAINLEVEL_OUTOF10: 9
PAINLEVEL_OUTOF10: 0 - NO PAIN
PAINLEVEL_OUTOF10: 3
PAINLEVEL_OUTOF10: 2
PAINLEVEL_OUTOF10: 0 - NO PAIN
PAINLEVEL_OUTOF10: 0 - NO PAIN
PAINLEVEL_OUTOF10: 9
PAINLEVEL_OUTOF10: 0 - NO PAIN
PAINLEVEL_OUTOF10: 9
PAINLEVEL_OUTOF10: 1
PAINLEVEL_OUTOF10: 7
PAINLEVEL_OUTOF10: 0 - NO PAIN
PAINLEVEL_OUTOF10: 7
PAINLEVEL_OUTOF10: 0 - NO PAIN
PAINLEVEL_OUTOF10: 7
PAINLEVEL_OUTOF10: 8
PAINLEVEL_OUTOF10: 9
PAINLEVEL_OUTOF10: 0 - NO PAIN
PAINLEVEL_OUTOF10: 0 - NO PAIN
PAINLEVEL_OUTOF10: 10 - WORST POSSIBLE PAIN
PAINLEVEL_OUTOF10: 3
PAINLEVEL_OUTOF10: 7
PAINLEVEL_OUTOF10: 0 - NO PAIN
PAINLEVEL_OUTOF10: 7
PAINLEVEL_OUTOF10: 9
PAINLEVEL_OUTOF10: 8
PAINLEVEL_OUTOF10: 0 - NO PAIN
PAINLEVEL_OUTOF10: 3
PAINLEVEL_OUTOF10: 3
PAINLEVEL_OUTOF10: 0 - NO PAIN
PAINLEVEL_OUTOF10: 3
PAINLEVEL_OUTOF10: 0 - NO PAIN
PAINLEVEL_OUTOF10: 5 - MODERATE PAIN
PAINLEVEL_OUTOF10: 0 - NO PAIN
PAINLEVEL_OUTOF10: 7
PAINLEVEL_OUTOF10: 0 - NO PAIN
PAINLEVEL_OUTOF10: 0 - NO PAIN
PAINLEVEL_OUTOF10: 5 - MODERATE PAIN
PAINLEVEL_OUTOF10: 6
PAINLEVEL_OUTOF10: 6
PAINLEVEL_OUTOF10: 0 - NO PAIN
PAINLEVEL_OUTOF10: 0 - NO PAIN
PAINLEVEL_OUTOF10: 2
PAINLEVEL_OUTOF10: 0 - NO PAIN
PAINLEVEL_OUTOF10: 7
PAINLEVEL_OUTOF10: 4
PAINLEVEL_OUTOF10: 0 - NO PAIN
PAINLEVEL_OUTOF10: 9
PAINLEVEL_OUTOF10: 0 - NO PAIN
PAINLEVEL_OUTOF10: 8
PAINLEVEL_OUTOF10: 7
PAINLEVEL_OUTOF10: 8
PAINLEVEL_OUTOF10: 10 - WORST POSSIBLE PAIN
PAINLEVEL_OUTOF10: 5 - MODERATE PAIN
PAINLEVEL_OUTOF10: 10 - WORST POSSIBLE PAIN
PAINLEVEL_OUTOF10: 7
PAINLEVEL_OUTOF10: 9
PAINLEVEL_OUTOF10: 0 - NO PAIN
PAINLEVEL_OUTOF10: 8
PAINLEVEL_OUTOF10: 3
PAINLEVEL_OUTOF10: 0 - NO PAIN
PAINLEVEL_OUTOF10: 4
PAINLEVEL_OUTOF10: 3
PAINLEVEL_OUTOF10: 5 - MODERATE PAIN
PAINLEVEL_OUTOF10: 9
PAINLEVEL_OUTOF10: 0 - NO PAIN
PAINLEVEL_OUTOF10: 4
PAINLEVEL_OUTOF10: 8
PAINLEVEL_OUTOF10: 0 - NO PAIN
PAINLEVEL_OUTOF10: 7
PAINLEVEL_OUTOF10: 3
PAINLEVEL_OUTOF10: 9
PAINLEVEL_OUTOF10: 0 - NO PAIN
PAINLEVEL_OUTOF10: 3
PAINLEVEL_OUTOF10: 0 - NO PAIN
PAINLEVEL_OUTOF10: 8
PAINLEVEL_OUTOF10: 0 - NO PAIN
PAINLEVEL_OUTOF10: 0 - NO PAIN
PAINLEVEL_OUTOF10: 3
PAINLEVEL_OUTOF10: 0 - NO PAIN
PAINLEVEL_OUTOF10: 9
PAINLEVEL_OUTOF10: 0 - NO PAIN
PAINLEVEL_OUTOF10: 0 - NO PAIN
PAINLEVEL_OUTOF10: 5 - MODERATE PAIN
PAINLEVEL_OUTOF10: 4
PAINLEVEL_OUTOF10: 3
PAINLEVEL_OUTOF10: 0 - NO PAIN
PAINLEVEL_OUTOF10: 9
PAINLEVEL_OUTOF10: 0 - NO PAIN
PAINLEVEL_OUTOF10: 5 - MODERATE PAIN
PAINLEVEL_OUTOF10: 0 - NO PAIN
PAINLEVEL_OUTOF10: 6
PAINLEVEL_OUTOF10: 0 - NO PAIN
PAINLEVEL_OUTOF10: 3
PAINLEVEL_OUTOF10: 8
PAINLEVEL_OUTOF10: 0 - NO PAIN
PAINLEVEL_OUTOF10: 3
PAINLEVEL_OUTOF10: 0 - NO PAIN
PAINLEVEL_OUTOF10: 2
PAINLEVEL_OUTOF10: 0 - NO PAIN
PAINLEVEL_OUTOF10: 7
PAINLEVEL_OUTOF10: 0 - NO PAIN
PAINLEVEL_OUTOF10: 9
PAINLEVEL_OUTOF10: 5 - MODERATE PAIN
PAINLEVEL_OUTOF10: 8
PAINLEVEL_OUTOF10: 4
PAINLEVEL_OUTOF10: 0 - NO PAIN
PAINLEVEL_OUTOF10: 0 - NO PAIN
PAINLEVEL_OUTOF10: 10 - WORST POSSIBLE PAIN
PAINLEVEL_OUTOF10: 0 - NO PAIN
PAINLEVEL_OUTOF10: 6
PAINLEVEL_OUTOF10: 0 - NO PAIN
PAINLEVEL_OUTOF10: 6
PAINLEVEL_OUTOF10: 0 - NO PAIN
PAINLEVEL_OUTOF10: 0 - NO PAIN
PAINLEVEL_OUTOF10: 8
PAINLEVEL_OUTOF10: 0 - NO PAIN
PAINLEVEL_OUTOF10: 0 - NO PAIN
PAINLEVEL_OUTOF10: 10 - WORST POSSIBLE PAIN
PAINLEVEL_OUTOF10: 0 - NO PAIN
PAINLEVEL_OUTOF10: 9
PAINLEVEL_OUTOF10: 7
PAINLEVEL_OUTOF10: 0 - NO PAIN
PAINLEVEL_OUTOF10: 0 - NO PAIN
PAINLEVEL_OUTOF10: 6
PAINLEVEL_OUTOF10: 3
PAINLEVEL_OUTOF10: 3
PAINLEVEL_OUTOF10: 10 - WORST POSSIBLE PAIN
PAINLEVEL_OUTOF10: 0 - NO PAIN
PAINLEVEL_OUTOF10: 2
PAINLEVEL_OUTOF10: 3
PAINLEVEL_OUTOF10: 0 - NO PAIN
PAINLEVEL_OUTOF10: 5 - MODERATE PAIN
PAINLEVEL_OUTOF10: 2
PAINLEVEL_OUTOF10: 6
PAINLEVEL_OUTOF10: 7
PAINLEVEL_OUTOF10: 0 - NO PAIN
PAINLEVEL_OUTOF10: 3
PAINLEVEL_OUTOF10: 9
PAINLEVEL_OUTOF10: 9
PAINLEVEL_OUTOF10: 6
PAINLEVEL_OUTOF10: 0 - NO PAIN
PAINLEVEL_OUTOF10: 5 - MODERATE PAIN
PAINLEVEL_OUTOF10: 7
PAINLEVEL_OUTOF10: 8
PAINLEVEL_OUTOF10: 9
PAINLEVEL_OUTOF10: 7
PAINLEVEL_OUTOF10: 0 - NO PAIN
PAINLEVEL_OUTOF10: 7
PAINLEVEL_OUTOF10: 3
PAINLEVEL_OUTOF10: 0 - NO PAIN
PAINLEVEL_OUTOF10: 1
PAINLEVEL_OUTOF10: 4
PAINLEVEL_OUTOF10: 0 - NO PAIN
PAINLEVEL_OUTOF10: 8
PAINLEVEL_OUTOF10: 8
PAINLEVEL_OUTOF10: 0 - NO PAIN
PAINLEVEL_OUTOF10: 8
PAINLEVEL_OUTOF10: 6
PAINLEVEL_OUTOF10: 7
PAINLEVEL_OUTOF10: 6
PAINLEVEL_OUTOF10: 0 - NO PAIN
PAINLEVEL_OUTOF10: 3
PAINLEVEL_OUTOF10: 0 - NO PAIN
PAINLEVEL_OUTOF10: 5 - MODERATE PAIN
PAINLEVEL_OUTOF10: 0 - NO PAIN
PAINLEVEL_OUTOF10: 7
PAINLEVEL_OUTOF10: 4
PAINLEVEL_OUTOF10: 7
PAINLEVEL_OUTOF10: 8
PAINLEVEL_OUTOF10: 0 - NO PAIN
PAINLEVEL_OUTOF10: 0 - NO PAIN
PAINLEVEL_OUTOF10: 2
PAINLEVEL_OUTOF10: 0 - NO PAIN
PAINLEVEL_OUTOF10: 8
PAINLEVEL_OUTOF10: 4
PAINLEVEL_OUTOF10: 4
PAINLEVEL_OUTOF10: 0 - NO PAIN
PAINLEVEL_OUTOF10: 7
PAINLEVEL_OUTOF10: 0 - NO PAIN
PAINLEVEL_OUTOF10: 10 - WORST POSSIBLE PAIN
PAINLEVEL_OUTOF10: 0 - NO PAIN
PAINLEVEL_OUTOF10: 7
PAINLEVEL_OUTOF10: 1
PAINLEVEL_OUTOF10: 0 - NO PAIN
PAINLEVEL_OUTOF10: 7
PAINLEVEL_OUTOF10: 0 - NO PAIN
PAINLEVEL_OUTOF10: 0 - NO PAIN
PAINLEVEL_OUTOF10: 9
PAINLEVEL_OUTOF10: 9
PAINLEVEL_OUTOF10: 0 - NO PAIN
PAINLEVEL_OUTOF10: 0 - NO PAIN
PAINLEVEL_OUTOF10: 10 - WORST POSSIBLE PAIN
PAINLEVEL_OUTOF10: 7
PAINLEVEL_OUTOF10: 1
PAINLEVEL_OUTOF10: 3
PAINLEVEL_OUTOF10: 0 - NO PAIN
PAINLEVEL_OUTOF10: 0 - NO PAIN
PAINLEVEL_OUTOF10: 6
PAINLEVEL_OUTOF10: 9
PAINLEVEL_OUTOF10: 1
PAINLEVEL_OUTOF10: 9
PAINLEVEL_OUTOF10: 0 - NO PAIN
PAINLEVEL_OUTOF10: 0 - NO PAIN
PAINLEVEL_OUTOF10: 7
PAINLEVEL_OUTOF10: 0 - NO PAIN
PAINLEVEL_OUTOF10: 9
PAINLEVEL_OUTOF10: 5 - MODERATE PAIN
PAINLEVEL_OUTOF10: 10 - WORST POSSIBLE PAIN
PAINLEVEL_OUTOF10: 2
PAINLEVEL_OUTOF10: 0 - NO PAIN
PAINLEVEL_OUTOF10: 5 - MODERATE PAIN
PAINLEVEL_OUTOF10: 0 - NO PAIN
PAINLEVEL_OUTOF10: 0 - NO PAIN
PAINLEVEL_OUTOF10: 8
PAINLEVEL_OUTOF10: 5 - MODERATE PAIN
PAINLEVEL_OUTOF10: 0 - NO PAIN
PAINLEVEL_OUTOF10: 9
PAINLEVEL_OUTOF10: 10 - WORST POSSIBLE PAIN
PAINLEVEL_OUTOF10: 0 - NO PAIN
PAINLEVEL_OUTOF10: 9
PAINLEVEL_OUTOF10: 0 - NO PAIN
PAINLEVEL_OUTOF10: 5 - MODERATE PAIN
PAINLEVEL_OUTOF10: 5 - MODERATE PAIN
PAINLEVEL_OUTOF10: 0 - NO PAIN
PAINLEVEL_OUTOF10: 0 - NO PAIN
PAINLEVEL_OUTOF10: 3
PAINLEVEL_OUTOF10: 8
PAINLEVEL_OUTOF10: 7
PAINLEVEL_OUTOF10: 4
PAINLEVEL_OUTOF10: 0 - NO PAIN
PAINLEVEL_OUTOF10: 3
PAINLEVEL_OUTOF10: 0 - NO PAIN
PAINLEVEL_OUTOF10: 2
PAINLEVEL_OUTOF10: 0 - NO PAIN
PAINLEVEL_OUTOF10: 3
PAINLEVEL_OUTOF10: 0 - NO PAIN
PAINLEVEL_OUTOF10: 0 - NO PAIN
PAINLEVEL_OUTOF10: 7
PAINLEVEL_OUTOF10: 0 - NO PAIN
PAINLEVEL_OUTOF10: 0 - NO PAIN
PAINLEVEL_OUTOF10: 5 - MODERATE PAIN
PAINLEVEL_OUTOF10: 5 - MODERATE PAIN
PAINLEVEL_OUTOF10: 0 - NO PAIN
PAINLEVEL_OUTOF10: 0 - NO PAIN
PAINLEVEL_OUTOF10: 6
PAINLEVEL_OUTOF10: 2
PAINLEVEL_OUTOF10: 7
PAINLEVEL_OUTOF10: 10 - WORST POSSIBLE PAIN
PAINLEVEL_OUTOF10: 0 - NO PAIN
PAINLEVEL_OUTOF10: 10 - WORST POSSIBLE PAIN
PAINLEVEL_OUTOF10: 0 - NO PAIN
PAINLEVEL_OUTOF10: 9
PAINLEVEL_OUTOF10: 0 - NO PAIN
PAINLEVEL_OUTOF10: 2
PAINLEVEL_OUTOF10: 8
PAINLEVEL_OUTOF10: 0 - NO PAIN
PAINLEVEL_OUTOF10: 7
PAINLEVEL_OUTOF10: 6
PAINLEVEL_OUTOF10: 0 - NO PAIN
PAINLEVEL_OUTOF10: 0 - NO PAIN
PAINLEVEL_OUTOF10: 3
PAINLEVEL_OUTOF10: 0 - NO PAIN
PAINLEVEL_OUTOF10: 5 - MODERATE PAIN
PAINLEVEL_OUTOF10: 8
PAINLEVEL_OUTOF10: 9
PAINLEVEL_OUTOF10: 0 - NO PAIN
PAINLEVEL_OUTOF10: 10 - WORST POSSIBLE PAIN
PAINLEVEL_OUTOF10: 4
PAINLEVEL_OUTOF10: 1
PAINLEVEL_OUTOF10: 0 - NO PAIN
PAINLEVEL_OUTOF10: 9
PAINLEVEL_OUTOF10: 2
PAINLEVEL_OUTOF10: 0 - NO PAIN
PAINLEVEL_OUTOF10: 9
PAINLEVEL_OUTOF10: 0 - NO PAIN
PAINLEVEL_OUTOF10: 0 - NO PAIN
PAINLEVEL_OUTOF10: 4
PAINLEVEL_OUTOF10: 0 - NO PAIN
PAINLEVEL_OUTOF10: 1
PAINLEVEL_OUTOF10: 0 - NO PAIN
PAINLEVEL_OUTOF10: 7
PAINLEVEL_OUTOF10: 0 - NO PAIN
PAINLEVEL_OUTOF10: 9
PAINLEVEL_OUTOF10: 0 - NO PAIN
PAINLEVEL_OUTOF10: 9
PAINLEVEL_OUTOF10: 10 - WORST POSSIBLE PAIN
PAINLEVEL_OUTOF10: 1
PAINLEVEL_OUTOF10: 0 - NO PAIN
PAINLEVEL_OUTOF10: 6
PAINLEVEL_OUTOF10: 0 - NO PAIN
PAINLEVEL_OUTOF10: 8
PAINLEVEL_OUTOF10: 7
PAINLEVEL_OUTOF10: 10 - WORST POSSIBLE PAIN
PAINLEVEL_OUTOF10: 9
PAINLEVEL_OUTOF10: 9
PAINLEVEL_OUTOF10: 5 - MODERATE PAIN
PAINLEVEL_OUTOF10: 0 - NO PAIN
PAINLEVEL_OUTOF10: 6
PAINLEVEL_OUTOF10: 6
PAINLEVEL_OUTOF10: 0 - NO PAIN
PAINLEVEL_OUTOF10: 7
PAINLEVEL_OUTOF10: 0 - NO PAIN
PAINLEVEL_OUTOF10: 2
PAINLEVEL_OUTOF10: 0 - NO PAIN
PAINLEVEL_OUTOF10: 0 - NO PAIN
PAINLEVEL_OUTOF10: 3
PAINLEVEL_OUTOF10: 7
PAINLEVEL_OUTOF10: 8
PAINLEVEL_OUTOF10: 0 - NO PAIN
PAINLEVEL_OUTOF10: 3
PAINLEVEL_OUTOF10: 0 - NO PAIN
PAINLEVEL_OUTOF10: 4
PAINLEVEL_OUTOF10: 8
PAINLEVEL_OUTOF10: 0 - NO PAIN

## 2024-01-01 ASSESSMENT — PAIN DESCRIPTION - LOCATION
LOCATION: BACK
LOCATION: LEG
LOCATION: BACK
LOCATION: BACK
LOCATION: CHEST
LOCATION: ARM
LOCATION: NECK
LOCATION: BACK
LOCATION: ARM
LOCATION: BACK
LOCATION: BACK
LOCATION: GENERALIZED
LOCATION: BACK
LOCATION: BACK
LOCATION: FOOT
LOCATION: FOOT
LOCATION: BACK
LOCATION: KNEE
LOCATION: GROIN
LOCATION: KNEE
LOCATION: BACK
LOCATION: SHOULDER
LOCATION: BACK
LOCATION: ABDOMEN
LOCATION: BACK
LOCATION: ABDOMEN
LOCATION: BACK
LOCATION: CHEST
LOCATION: BACK
LOCATION: GENERALIZED
LOCATION: OTHER (COMMENT)
LOCATION: BACK
LOCATION: ABDOMEN
LOCATION: KNEE
LOCATION: GENERALIZED
LOCATION: BACK
LOCATION: KNEE
LOCATION: LEG
LOCATION: BACK
LOCATION: GROIN
LOCATION: BACK
LOCATION: ABDOMEN
LOCATION: BACK
LOCATION: LEG
LOCATION: BACK
LOCATION: GROIN
LOCATION: BACK
LOCATION: LEG
LOCATION: BACK
LOCATION: BACK
LOCATION: GROIN
LOCATION: BACK
LOCATION: GROIN
LOCATION: BACK
LOCATION: BACK
LOCATION: GENERALIZED
LOCATION: LEG
LOCATION: BACK
LOCATION: BACK
LOCATION: GENERALIZED
LOCATION: BACK
LOCATION: LEG
LOCATION: GROIN

## 2024-01-01 ASSESSMENT — ENCOUNTER SYMPTOMS
DIARRHEA: 0
FATIGUE: 1
FATIGUE: 1
MUSCULOSKELETAL NEGATIVE: 1
VOMITING: 0
ARTHRALGIAS: 1
CHILLS: 1
NAUSEA: 0
UNEXPECTED WEIGHT CHANGE: 1
PALPITATIONS: 0
MUSCULOSKELETAL NEGATIVE: 1
NAUSEA: 0
DIARRHEA: 1
SHORTNESS OF BREATH: 0
ACTIVITY CHANGE: 0
EYE PAIN: 0
FATIGUE: 1
BACK PAIN: 1
PSYCHIATRIC NEGATIVE: 1
CHEST TIGHTNESS: 0
FATIGUE: 1
NUMBNESS: 0
RESPIRATORY NEGATIVE: 1
AGITATION: 0
VOMITING: 1
UNEXPECTED WEIGHT CHANGE: 0
FATIGUE: 1
HEADACHES: 0
FATIGUE: 1
TROUBLE SWALLOWING: 0
NAUSEA: 1
ABDOMINAL PAIN: 1
BRUISES/BLEEDS EASILY: 0
POLYPHAGIA: 0
HEADACHES: 0
COUGH: 0
FREQUENCY: 0
AGITATION: 0
NEUROLOGICAL NEGATIVE: 1
SHORTNESS OF BREATH: 0
CONFUSION: 0
NAUSEA: 1
MYALGIAS: 1
POLYDIPSIA: 0
JOINT SWELLING: 0
ABDOMINAL PAIN: 0
APPETITE CHANGE: 1
EYE REDNESS: 0
COUGH: 0
DIZZINESS: 0
CHEST TIGHTNESS: 1
NAUSEA: 1
PALPITATIONS: 0
NAUSEA: 1
DIAPHORESIS: 0
DIZZINESS: 0
DYSURIA: 0
APPETITE CHANGE: 1
NAUSEA: 1
ABDOMINAL PAIN: 1
LIGHT-HEADEDNESS: 0
SORE THROAT: 0

## 2024-01-01 ASSESSMENT — ACTIVITIES OF DAILY LIVING (ADL)
HOME_MANAGEMENT_TIME_ENTRY: 13
JUDGMENT_ADEQUATE_SAFELY_COMPLETE_DAILY_ACTIVITIES: YES
FEEDING YOURSELF: INDEPENDENT
BATHING: INDEPENDENT
TOILETING: INDEPENDENT
BATHING_ASSISTANCE: MODERATE
HEARING - LEFT EAR: FUNCTIONAL
HEARING - RIGHT EAR: FUNCTIONAL
ADL_ASSISTANCE: INDEPENDENT
DRESSING YOURSELF: INDEPENDENT
LACK_OF_TRANSPORTATION: NO
PATIENT'S MEMORY ADEQUATE TO SAFELY COMPLETE DAILY ACTIVITIES?: YES
HOME_MANAGEMENT_TIME_ENTRY: 25
LACK_OF_TRANSPORTATION: NO
WALKS IN HOME: INDEPENDENT
GROOMING: INDEPENDENT
ADL_ASSISTANCE: INDEPENDENT
BATHING_ASSISTANCE: MODERATE
ADEQUATE_TO_COMPLETE_ADL: YES
ADL_ASSISTANCE: INDEPENDENT
ADL_ASSISTANCE: INDEPENDENT
BATHING_ASSISTANCE: MODERATE
ADLS_ADDRESSED: NO

## 2024-01-01 ASSESSMENT — PAIN DESCRIPTION - ORIENTATION
ORIENTATION: RIGHT
ORIENTATION: LOWER
ORIENTATION: RIGHT
ORIENTATION: LEFT
ORIENTATION: LOWER
ORIENTATION: LEFT
ORIENTATION: RIGHT
ORIENTATION: LEFT
ORIENTATION: LEFT
ORIENTATION: RIGHT
ORIENTATION: LOWER;MID
ORIENTATION: RIGHT;LEFT
ORIENTATION: UPPER;RIGHT
ORIENTATION: RIGHT
ORIENTATION: LOWER
ORIENTATION: RIGHT
ORIENTATION: LEFT
ORIENTATION: LEFT
ORIENTATION: MID
ORIENTATION: LOWER;MID
ORIENTATION: RIGHT
ORIENTATION: LOWER
ORIENTATION: LOWER
ORIENTATION: RIGHT;LEFT
ORIENTATION: RIGHT
ORIENTATION: RIGHT

## 2024-01-01 ASSESSMENT — LIFESTYLE VARIABLES
HOW MANY STANDARD DRINKS CONTAINING ALCOHOL DO YOU HAVE ON A TYPICAL DAY: PATIENT DOES NOT DRINK
EVER HAD A DRINK FIRST THING IN THE MORNING TO STEADY YOUR NERVES TO GET RID OF A HANGOVER: NO
AUDIT-C TOTAL SCORE: 0
HAVE YOU EVER FELT YOU SHOULD CUT DOWN ON YOUR DRINKING: NO
SKIP TO QUESTIONS 9-10: 1
HAVE PEOPLE ANNOYED YOU BY CRITICIZING YOUR DRINKING: NO
EVER FELT BAD OR GUILTY ABOUT YOUR DRINKING: NO
HOW OFTEN DO YOU HAVE A DRINK CONTAINING ALCOHOL: NEVER
HOW OFTEN DO YOU HAVE 6 OR MORE DRINKS ON ONE OCCASION: NEVER
AUDIT-C TOTAL SCORE: 0

## 2024-01-01 ASSESSMENT — PAIN DESCRIPTION - DESCRIPTORS
DESCRIPTORS: ACHING;CRAMPING
DESCRIPTORS: ACHING
DESCRIPTORS: DISCOMFORT;ACHING
DESCRIPTORS: THROBBING
DESCRIPTORS: DISCOMFORT;SORE
DESCRIPTORS: ACHING;DISCOMFORT;SHARP
DESCRIPTORS: ACHING;DULL
DESCRIPTORS: DISCOMFORT;SORE
DESCRIPTORS: ACHING
DESCRIPTORS: ACHING;CRAMPING
DESCRIPTORS: DISCOMFORT;DULL
DESCRIPTORS: ACHING;SHARP;DISCOMFORT
DESCRIPTORS: DISCOMFORT;SPASM
DESCRIPTORS: ACHING
DESCRIPTORS: TIGHTNESS
DESCRIPTORS: DISCOMFORT;SORE
DESCRIPTORS: DISCOMFORT
DESCRIPTORS: DISCOMFORT;PRESSURE
DESCRIPTORS: ACHING;DISCOMFORT;SORE
DESCRIPTORS: ACHING
DESCRIPTORS: ACHING
DESCRIPTORS: DISCOMFORT;SORE
DESCRIPTORS: ACHING
DESCRIPTORS: SORE
DESCRIPTORS: ACHING
DESCRIPTORS: ACHING;SORE
DESCRIPTORS: ACHING
DESCRIPTORS: ACHING
DESCRIPTORS: PINS AND NEEDLES;NUMBNESS
DESCRIPTORS: ACHING
DESCRIPTORS: ACHING
DESCRIPTORS: SHOOTING;SPASM
DESCRIPTORS: DISCOMFORT;SORE
DESCRIPTORS: ACHING
DESCRIPTORS: DISCOMFORT;PRESSURE
DESCRIPTORS: DISCOMFORT;SORE
DESCRIPTORS: DISCOMFORT
DESCRIPTORS: ACHING
DESCRIPTORS: ACHING

## 2024-01-01 ASSESSMENT — ANXIETY QUESTIONNAIRES
GAD7 TOTAL SCORE: 0
6. BECOMING EASILY ANNOYED OR IRRITABLE: NOT AT ALL
2. NOT BEING ABLE TO STOP OR CONTROL WORRYING: NOT AT ALL
1. FEELING NERVOUS, ANXIOUS, OR ON EDGE: NOT AT ALL
4. TROUBLE RELAXING: NOT AT ALL
7. FEELING AFRAID AS IF SOMETHING AWFUL MIGHT HAPPEN: NOT AT ALL
5. BEING SO RESTLESS THAT IT IS HARD TO SIT STILL: NOT AT ALL
3. WORRYING TOO MUCH ABOUT DIFFERENT THINGS: NOT AT ALL

## 2024-01-01 ASSESSMENT — PAIN SCALES - PAIN ASSESSMENT IN ADVANCED DEMENTIA (PAINAD)
FACIALEXPRESSION: SMILING OR INEXPRESSIVE
CONSOLABILITY: NO NEED TO CONSOLE
BREATHING: NORMAL
TOTALSCORE: 0
BODYLANGUAGE: RELAXED

## 2024-01-01 ASSESSMENT — PATIENT HEALTH QUESTIONNAIRE - PHQ9
5. POOR APPETITE OR OVEREATING: NOT AT ALL
SUM OF ALL RESPONSES TO PHQ QUESTIONS 1-9: 2
2. FEELING DOWN, DEPRESSED OR HOPELESS: NOT AT ALL
4. FEELING TIRED OR HAVING LITTLE ENERGY: NOT AT ALL
9. THOUGHTS THAT YOU WOULD BE BETTER OFF DEAD, OR OF HURTING YOURSELF: NOT AT ALL
1. LITTLE INTEREST OR PLEASURE IN DOING THINGS: NOT AT ALL
3. TROUBLE FALLING OR STAYING ASLEEP OR SLEEPING TOO MUCH: NOT AT ALL
1. LITTLE INTEREST OR PLEASURE IN DOING THINGS: SEVERAL DAYS
10. IF YOU CHECKED OFF ANY PROBLEMS, HOW DIFFICULT HAVE THESE PROBLEMS MADE IT FOR YOU TO DO YOUR WORK, TAKE CARE OF THINGS AT HOME, OR GET ALONG WITH OTHER PEOPLE: NOT DIFFICULT AT ALL
8. MOVING OR SPEAKING SO SLOWLY THAT OTHER PEOPLE COULD HAVE NOTICED. OR THE OPPOSITE, BEING SO FIGETY OR RESTLESS THAT YOU HAVE BEEN MOVING AROUND A LOT MORE THAN USUAL: NOT AT ALL
SUM OF ALL RESPONSES TO PHQ9 QUESTIONS 1 & 2: 0
7. TROUBLE CONCENTRATING ON THINGS, SUCH AS READING THE NEWSPAPER OR WATCHING TELEVISION: NOT AT ALL
SUM OF ALL RESPONSES TO PHQ9 QUESTIONS 1 & 2: 2
2. FEELING DOWN, DEPRESSED OR HOPELESS: SEVERAL DAYS
6. FEELING BAD ABOUT YOURSELF - OR THAT YOU ARE A FAILURE OR HAVE LET YOURSELF OR YOUR FAMILY DOWN: NOT AT ALL

## 2024-01-01 ASSESSMENT — COLUMBIA-SUICIDE SEVERITY RATING SCALE - C-SSRS
6. HAVE YOU EVER DONE ANYTHING, STARTED TO DO ANYTHING, OR PREPARED TO DO ANYTHING TO END YOUR LIFE?: NO
2. HAVE YOU ACTUALLY HAD ANY THOUGHTS OF KILLING YOURSELF?: NO
1. IN THE PAST MONTH, HAVE YOU WISHED YOU WERE DEAD OR WISHED YOU COULD GO TO SLEEP AND NOT WAKE UP?: NO

## 2024-01-02 NOTE — TELEPHONE ENCOUNTER
Spoke to patient. Patient with hoarse voice and coughing over phone. Spoke to PA at urgent care, who asked for most recent creatinine clearance and if patient can take Tamiflu if she is influenza A positive. Per Dr. Cornell and pharmacist Brenden Souza, okay to take Tamiflu, though it may alter IMS levels. Will closely monitor labs. Notified PA at urgent care.

## 2024-01-02 NOTE — TELEPHONE ENCOUNTER
Charla called and said that she is currently at urgent care and would like to speak to Claudia, call transferred to Mirian.

## 2024-01-04 NOTE — TELEPHONE ENCOUNTER
Called patient and requested updated labs tomorrow to make sure IMS levels are not fluctuating. NA, left message requesting labs.

## 2024-01-05 NOTE — TELEPHONE ENCOUNTER
Called and spoke with Charla about getting follow up labs. Pt stated she overslept and that she will be going to the lab this morning. Pt stated she feels okay, she is just fatigued. Pt verbalized understanding, will look for resulted labs later today.

## 2024-01-08 NOTE — TELEPHONE ENCOUNTER
Called patient regarding mychart message. Pt verbalized understanding on taking 1.5 mg of sirolimus daily. Pt stated that her son has step throat advised to monitor for symptoms and to call if she begins having symptoms.

## 2024-01-15 NOTE — TELEPHONE ENCOUNTER
Called and spoke with patient regarding abnormal Siro level of 13.9. Patient is currently taking 1.5mg and was recently increased from 1mg the week prior. Stated she had been sick and on ABX and asked to talk to Dr. Cornell before adjusting her dose again. Messaged Dr. THORPE and he stated he can call her back later to discuss. Called patient back and she is aware that Dr. THORPE is going to call her later today and she verbalized understanding.

## 2024-01-16 NOTE — TELEPHONE ENCOUNTER
Dr. Cornell called and spoke with patient, she had taken tamilfu for 5 days and  MD thinks the tamiflu could have contributed to elevated Siro level. Orders for patient to hold  sirolimus dose on 1/15/24.  And Resume Sirolimus dosing at 1mg daily pm 1/16/24.  Repeat labs next week  on tues/wednesday

## 2024-01-24 NOTE — TELEPHONE ENCOUNTER
Returned call to patient and she stated that she needs a refill on her Sirolimus sent to [facility]. Refill request was sent to Dr. Cornell to authorize.     Patient also states that any refills that have been previously sent to her [facility] cannot be sent there as they are closing.     Finally, patient asked if she could have another refill of Toradol. Informed patient that I would reach out to Dr. Cornell about this to inquire.

## 2024-01-24 NOTE — TELEPHONE ENCOUNTER
Patient called regarding medications.  Pls call her to discuss.  Some need changed to different pharmacy, some need refilled,.....

## 2024-01-25 NOTE — TELEPHONE ENCOUNTER
Dr. Cornell sent a script for Tramadol for patient, but asked if the patient could have a referral for pain management be sent. Called to update patient who states that she has a referral already, and needs to make an appointment.

## 2024-01-29 NOTE — TELEPHONE ENCOUNTER
Patient's Siro resulted at 9. Per Dr. Cornell, patient is to start taking 1 mg daily of her sirolimus. Patient to have repeat labs on 2/9. Patient verbalized understanding.

## 2024-02-01 NOTE — PRE-SEDATION DOCUMENTATION
Sedation Plan    ASA 3     Mallampati class: III.    Risks, benefits, and alternatives discussed with patient.       : Yes no

## 2024-02-09 NOTE — TELEPHONE ENCOUNTER
Patient called, has a conflict at time of appointment today.  Asked if she can make it a phone visit instead of in person.  Pls call patient to advise.

## 2024-02-12 NOTE — TELEPHONE ENCOUNTER
Patient's Sirolimus resulted at 13.0 (goal 7-9). Dr. Cornell, patient is to decrease her Sirolimus to 0.5 mg daily.     While speaking with patient she stated that over the last few weeks she has been vomiting and having diarrhea intermittently. Patient has been attributing this to her elevated Sirolimus levels recently, but wanted to let our team know.     Symptoms were reviewed with Dr. Cornell who states that the patient needs to continue monitoring her symptoms and let us know if they are worse, but does not need to come in at this time.     Called patient back who is agreeable. Will check in with patient later this week and patient is to repeat labs on 2/19/2024.

## 2024-02-15 PROBLEM — R57.0 CARDIOGENIC SHOCK (MULTI): Status: ACTIVE | Noted: 2024-01-01

## 2024-02-15 NOTE — Clinical Note
PA catheter left in place and secured by suture and transparent dressing. Catheter attached to a pressure bag. 48cm trey

## 2024-02-15 NOTE — Clinical Note
L neck thrombus seen on US. Limited locations available for placement. Procedure cancelled, tbd at later time

## 2024-02-15 NOTE — Clinical Note
Patient Clipped and Prepped: left neck. Prepped with ChloraPrep, a minimum of 3 minute dry time, longer if needed, no pooling noted, patient draped in sterile fashion.

## 2024-02-15 NOTE — Clinical Note
PA catheter left in place and secured by suture. Catheter attached to a pressure bag. Measurements taken: pressures and sats. Secured at 50 cm

## 2024-02-15 NOTE — Clinical Note
Patient arrived to cath lab with swan to right internal jugular in place. Ethel removed per Dr. Gillombardo.

## 2024-02-15 NOTE — Clinical Note
Sheath was exchanged in the right femoral artery with ACCESS KIT, MINI LARRY, 4 FR X 10CM, 0.018 X 40CM, NITINOL/PLATINUM, STD NEEDLE.

## 2024-02-15 NOTE — Clinical Note
Accessed site: right femoral artery.   Ultrasound guidance was used. Patient Name: Maxx Echeverria  YOB: 1950          MRN number:  [id number]  Date:  9/27/2017  Referring Physician:  Sunita Del Castillo    Discharge Summary  Initial Functional Outcome Score 57/100  Final Functional Outcome Score 72/100  Number of

## 2024-02-15 NOTE — Clinical Note
Patient Clipped and Prepped: right brachial. Prepped with ChloraPrep, a minimum of 3 minute dry time, longer if needed, no pooling noted, patient draped in sterile fashion.

## 2024-02-15 NOTE — Clinical Note
Sheath was left in place in the right femoral vein. Site secured by suture and transparent dressing. A pressure bag was used. Hemostasis obtained.

## 2024-02-16 PROBLEM — Z48.21: Status: ACTIVE | Noted: 2024-01-01

## 2024-02-16 NOTE — PROGRESS NOTES
"Charla Bowles is a 33 y.o. female on day 1 of admission presenting with Cardiogenic shock (CMS/HCC).    Subjective   Patient hemodynamically stable on Milrinone and Nipride gtt at this time.  She reports feeling better than when she first came in.  She has no c/o chest pain or SOB her only complaint is feeling tired.      Plan:   -Arrived on milrinone 0.375mcg/kg/min   -Started nipride overnight   -1st dose pulse steroids given overnight   -0.5mg SL tacro at 3am then home dose 1.5mg tacro dose at 0630.   - 40mg IV lasix x1   -urine lytes and renal artery US  - HLA's sent  -Endo consult  -Plan for embx in AM.      Objective     Physical Exam  HENT:      Head: Normocephalic and atraumatic.      Mouth/Throat:      Mouth: Mucous membranes are moist.   Eyes:      Pupils: Pupils are equal, round, and reactive to light.   Cardiovascular:      Rate and Rhythm: Regular rhythm. Tachycardia present.      Heart sounds: Normal heart sounds, S1 normal and S2 normal. Heart sounds not distant. No murmur heard.     No friction rub. No gallop.   Pulmonary:      Effort: Pulmonary effort is normal.      Breath sounds: Normal breath sounds and air entry.   Abdominal:      General: Abdomen is flat.      Palpations: Abdomen is soft.   Musculoskeletal:      Cervical back: Normal range of motion.   Skin:     General: Skin is warm and dry.      Capillary Refill: Capillary refill takes less than 2 seconds.   Neurological:      General: No focal deficit present.      Mental Status: She is alert.   Psychiatric:         Attention and Perception: Attention normal.         Speech: Speech normal.         Behavior: Behavior normal.         Last Recorded Vitals  Blood pressure 93/51, pulse (!) 130, temperature 37.1 °C (98.8 °F), resp. rate (!) 28, height 1.549 m (5' 1\"), weight 99 kg (218 lb 4.1 oz), SpO2 91 %.  PAP: (31-46)/(19-35) 36/27  CVP:  [10 mmHg-13 mmHg] 13 mmHg  CO:  [3.1 L/min-4.2 L/min] 4.2 L/min  CI:  [1.6 L/min/m2-2.1 L/min/m2] " 2.1 L/min/m2     Intake/Output last 3 Shifts:  I/O last 3 completed shifts:  In: 836.7 (8.5 mL/kg) [I.V.:136.7 (1.4 mL/kg); IV Piggyback:700]  Out: 775 (7.8 mL/kg) [Urine:775 (0.2 mL/kg/hr)]  Weight: 99 kg     Relevant Results  Results for orders placed or performed during the hospital encounter of 02/15/24 (from the past 24 hour(s))   Blood Gas Venous Full Panel Unsolicited   Result Value Ref Range    POCT pH, Venous 7.26 (L) 7.33 - 7.43 pH    POCT pCO2, Venous 38 (L) 41 - 51 mm Hg    POCT pO2, Venous 20 (L) 35 - 45 mm Hg    POCT SO2, Venous 12 (L) 45 - 75 %    POCT Oxy Hemoglobin, Venous 11.4 (L) 45.0 - 75.0 %    POCT Hematocrit Calculated, Venous 34.0 (L) 36.0 - 46.0 %    POCT Sodium, Venous 129 (L) 136 - 145 mmol/L    POCT Potassium, Venous 5.3 3.5 - 5.3 mmol/L    POCT Chloride, Venous 96 (L) 98 - 107 mmol/L    POCT Ionized [place], Venous 1.10 1.10 - 1.33 mmol/L    POCT Glucose, Venous 151 (H) 74 - 99 mg/dL    POCT Lactate, Venous 4.7 (HH) 0.4 - 2.0 mmol/L    POCT Base Excess, Venous -9.3 (L) -2.0 - 3.0 mmol/L    POCT HCO3 Calculated, Venous 17.1 (L) 22.0 - 26.0 mmol/L    POCT Hemoglobin, Venous 11.3 (L) 12.0 - 16.0 g/dL    POCT Anion Gap, Venous 21.0 10.0 - 25.0 mmol/L    Patient Temperature 37.0 degrees Celsius   CBC and Auto Differential   Result Value Ref Range    WBC 7.0 4.4 - 11.3 x10*3/uL    nRBC 0.4 (H) 0.0 - 0.0 /100 WBCs    RBC 4.63 4.00 - 5.20 x10*6/uL    Hemoglobin 12.6 12.0 - 16.0 g/dL    Hematocrit 36.9 36.0 - 46.0 %    MCV 80 80 - 100 fL    MCH 27.2 26.0 - 34.0 pg    MCHC 34.1 32.0 - 36.0 g/dL    RDW 15.4 (H) 11.5 - 14.5 %    Platelets 204 150 - 450 x10*3/uL    Neutrophils % 72.9 40.0 - 80.0 %    Immature Granulocytes %, Automated 0.9 0.0 - 0.9 %    Lymphocytes % 16.1 13.0 - 44.0 %    Monocytes % 9.9 2.0 - 10.0 %    Eosinophils % 0.1 0.0 - 6.0 %    Basophils % 0.1 0.0 - 2.0 %    Neutrophils Absolute 5.08 1.20 - 7.70 x10*3/uL    Immature Granulocytes Absolute, Automated 0.06 0.00 - 0.70 x10*3/uL     Lymphocytes Absolute 1.12 (L) 1.20 - 4.80 x10*3/uL    Monocytes Absolute 0.69 0.10 - 1.00 x10*3/uL    Eosinophils Absolute 0.01 0.00 - 0.70 x10*3/uL    Basophils Absolute 0.01 0.00 - 0.10 x10*3/uL   Comprehensive metabolic panel   Result Value Ref Range    Glucose 136 (H) 74 - 99 mg/dL    Sodium 131 (L) 136 - 145 mmol/L    Potassium 4.7 3.5 - 5.3 mmol/L    Chloride 98 98 - 107 mmol/L    Bicarbonate 15 (L) 21 - 32 mmol/L    Anion Gap 23 (H) 10 - 20 mmol/L    Urea Nitrogen 40 (H) 6 - 23 mg/dL    Creatinine 2.26 (H) 0.50 - 1.05 mg/dL    eGFR 29 (L) >60 mL/min/1.73m*2    Calcium 9.2 8.6 - 10.6 mg/dL    Albumin 4.1 3.4 - 5.0 g/dL    Alkaline Phosphatase 61 33 - 110 U/L    Total Protein 7.4 6.4 - 8.2 g/dL     (H) 9 - 39 U/L    Bilirubin, Total 1.9 (H) 0.0 - 1.2 mg/dL     (H) 7 - 45 U/L   Magnesium   Result Value Ref Range    Magnesium 2.01 1.60 - 2.40 mg/dL   Troponin I, High Sensitivity   Result Value Ref Range    Troponin I, High Sensitivity 125 (HH) 0 - 34 ng/L   B-Type Natriuretic Peptide   Result Value Ref Range    BNP >5,000 (H) 0 - 99 pg/mL   Blood Culture    Specimen: Peripheral Venipuncture; Blood culture   Result Value Ref Range    Blood Culture Loaded on Instrument - Culture in progress    Blood Culture    Specimen: Peripheral Venipuncture; Blood culture   Result Value Ref Range    Blood Culture Loaded on Instrument - Culture in progress    TSH with reflex to Free T4 if abnormal   Result Value Ref Range    Thyroid Stimulating Hormone 15.16 (H) 0.44 - 3.98 mIU/L   hCG, quantitative, pregnancy   Result Value Ref Range    HCG, Beta-Quantitative <3 <5 mIU/mL   Sedimentation rate, automated   Result Value Ref Range    Sedimentation Rate 11 0 - 20 mm/h   C-reactive protein   Result Value Ref Range    C-Reactive Protein 6.06 (H) <1.00 mg/dL   Tacrolimus level   Result Value Ref Range    Tacrolimus  3.9 <=15.0 ng/mL   Sirolimus level   Result Value Ref Range    Sirolimus  6.7 4.0 - 20.0 ng/mL    Thyroxine, Free   Result Value Ref Range    Thyroxine, Free 2.47 (H) 0.78 - 1.48 ng/dL   Sars-CoV-2 PCR   Result Value Ref Range    Coronavirus 2019, PCR Not Detected Not Detected   Influenza A, and B PCR   Result Value Ref Range    Flu A Result Not Detected Not Detected    Flu B Result Not Detected Not Detected   RSV PCR   Result Value Ref Range    RSV PCR Not Detected Not Detected   BLOOD GAS MIXED VENOUS FULL PANEL   Result Value Ref Range    POCT pH, Mixed 7.35 7.33 - 7.43 pH    POCT pCO2, Mixed 29 (L) 41 - 51 mm Hg    POCT pO2, Mixed 39 35 - 45 mm Hg    POCT SO2, Mixed 54 45 - 75 %    POCT Oxy Hemoglobin, Mixed 53.2 45.0 - 75.0 %    POCT Hematocrit Calculated, Mixed 32.0 (L) 36.0 - 46.0 %    POCT Sodium, Mixed 131 (L) 136 - 145 mmol/L    POCT Potassium, Mixed 4.3 3.5 - 5.3 mmol/L    POCT Chloride, Mixed 103 98 - 107 mmol/L    POCT Ionized Calcium, Mixed 1.07 (L) 1.10 - 1.33 mmol/L    POCT Glucose, Mixed 128 (H) 74 - 99 mg/dL    POCT Lactate, Mixed 2.4 (H) 0.4 - 2.0 mmol/L    POCT Base Excess, Mixed -8.5 (L) -2.0 - 3.0 mmol/L    POCT HCO3 Calculated, Mixed 16.0 (L) 22.0 - 26.0 mmol/L    POCT Hemoglobin, Mixed 10.7 (L) 12.0 - 16.0 g/dL    POCT Anion Gap, Mixed 16 10 - 25 mmo/L    Patient Temperature 37.0 degrees Celsius    FiO2 21 %   Light Blue Top   Result Value Ref Range    Extra Tube Hold for add-ons.    Troponin I, High Sensitivity   Result Value Ref Range    Troponin I, High Sensitivity 168 (HH) 0 - 34 ng/L   Magnesium   Result Value Ref Range    Magnesium 1.90 1.60 - 2.40 mg/dL   Hemoglobin A1C   Result Value Ref Range    Hemoglobin A1C 6.3 (H) see below %    Estimated Average Glucose 134 Not Established mg/dL   B-Type Natriuretic Peptide   Result Value Ref Range    BNP >5,000 (H) 0 - 99 pg/mL   Coagulation Screen   Result Value Ref Range    Protime 18.4 (H) 9.8 - 12.8 seconds    INR 1.6 (H) 0.9 - 1.1    aPTT 29 27 - 38 seconds   CBC and Auto Differential   Result Value Ref Range    WBC 8.0 4.4 - 11.3  x10*3/uL    nRBC 0.5 (H) 0.0 - 0.0 /100 WBCs    RBC 4.11 4.00 - 5.20 x10*6/uL    Hemoglobin 11.0 (L) 12.0 - 16.0 g/dL    Hematocrit 31.8 (L) 36.0 - 46.0 %    MCV 77 (L) 80 - 100 fL    MCH 26.8 26.0 - 34.0 pg    MCHC 34.6 32.0 - 36.0 g/dL    RDW 14.8 (H) 11.5 - 14.5 %    Platelets 201 150 - 450 x10*3/uL    Neutrophils % 71.0 40.0 - 80.0 %    Immature Granulocytes %, Automated 1.5 (H) 0.0 - 0.9 %    Lymphocytes % 17.9 13.0 - 44.0 %    Monocytes % 8.8 2.0 - 10.0 %    Eosinophils % 0.4 0.0 - 6.0 %    Basophils % 0.4 0.0 - 2.0 %    Neutrophils Absolute 5.65 1.20 - 7.70 x10*3/uL    Immature Granulocytes Absolute, Automated 0.12 0.00 - 0.70 x10*3/uL    Lymphocytes Absolute 1.42 1.20 - 4.80 x10*3/uL    Monocytes Absolute 0.70 0.10 - 1.00 x10*3/uL    Eosinophils Absolute 0.03 0.00 - 0.70 x10*3/uL    Basophils Absolute 0.03 0.00 - 0.10 x10*3/uL   Comprehensive metabolic panel   Result Value Ref Range    Glucose 124 (H) 74 - 99 mg/dL    Sodium 131 (L) 136 - 145 mmol/L    Potassium 4.6 3.5 - 5.3 mmol/L    Chloride 100 98 - 107 mmol/L    Bicarbonate 14 (L) 21 - 32 mmol/L    Anion Gap 22 (H) 10 - 20 mmol/L    Urea Nitrogen 41 (H) 6 - 23 mg/dL    Creatinine 2.12 (H) 0.50 - 1.05 mg/dL    eGFR 31 (L) >60 mL/min/1.73m*2    Calcium 8.9 8.6 - 10.6 mg/dL    Albumin 3.8 3.4 - 5.0 g/dL    Alkaline Phosphatase 59 33 - 110 U/L    Total Protein 6.6 6.4 - 8.2 g/dL    AST 1,079 (H) 9 - 39 U/L    Bilirubin, Total 1.5 (H) 0.0 - 1.2 mg/dL     (H) 7 - 45 U/L   Lipid Panel   Result Value Ref Range    Cholesterol 162 0 - 199 mg/dL    HDL-Cholesterol 39.2 mg/dL    Cholesterol/HDL Ratio 4.1     LDL Calculated 94 <=99 mg/dL    VLDL 28 0 - 40 mg/dL    Triglycerides 142 0 - 149 mg/dL    Non HDL Cholesterol 123 0 - 149 mg/dL   TSH with reflex to Free T4 if abnormal   Result Value Ref Range    Thyroid Stimulating Hormone 12.02 (H) 0.44 - 3.98 mIU/L   Iron and TIBC   Result Value Ref Range    Iron 42 35 - 150 ug/dL    UIBC 284 110 - 370 ug/dL    TIBC  326 240 - 445 ug/dL    % Saturation 13 (L) 25 - 45 %   Ferritin   Result Value Ref Range    Ferritin 308 (H) 8 - 150 ng/mL   Folate   Result Value Ref Range    Folate, Serum 23.9 >5.0 ng/mL   Vitamin B12   Result Value Ref Range    Vitamin B12 1,179 (H) 211 - 911 pg/mL   Thyroxine, Free   Result Value Ref Range    Thyroxine, Free 2.19 (H) 0.78 - 1.48 ng/dL   Transthoracic Echo (TTE) Limited   Result Value Ref Range    BSA 2.06 m2   BLOOD GAS MIXED VENOUS FULL PANEL   Result Value Ref Range    POCT pH, Mixed 7.35 7.33 - 7.43 pH    POCT pCO2, Mixed 32 (L) 41 - 51 mm Hg    POCT pO2, Mixed 41 35 - 45 mm Hg    POCT SO2, Mixed 56 45 - 75 %    POCT Oxy Hemoglobin, Mixed 54.7 45.0 - 75.0 %    POCT Hematocrit Calculated, Mixed 32.0 (L) 36.0 - 46.0 %    POCT Sodium, Mixed 130 (L) 136 - 145 mmol/L    POCT Potassium, Mixed 3.9 3.5 - 5.3 mmol/L    POCT Chloride, Mixed 103 98 - 107 mmol/L    POCT Ionized Calcium, Mixed 1.05 (L) 1.10 - 1.33 mmol/L    POCT Glucose, Mixed 206 (H) 74 - 99 mg/dL    POCT Lactate, Mixed 2.3 (H) 0.4 - 2.0 mmol/L    POCT Base Excess, Mixed -7.0 (L) -2.0 - 3.0 mmol/L    POCT HCO3 Calculated, Mixed 17.7 (L) 22.0 - 26.0 mmol/L    POCT Hemoglobin, Mixed 10.5 (L) 12.0 - 16.0 g/dL    POCT Anion Gap, Mixed 13 10 - 25 mmo/L    Patient Temperature 37.0 degrees Celsius    FiO2 21 %   Urinalysis with Reflex Culture and Microscopic   Result Value Ref Range    Color, Urine Yellow Light-Yellow, Yellow, Dark-Yellow    Appearance, Urine Clear Clear    Specific Gravity, Urine 1.011 1.005 - 1.035    pH, Urine 5.0 5.0, 5.5, 6.0, 6.5, 7.0, 7.5, 8.0    Protein, Urine 20 (TRACE) NEGATIVE, 10 (TRACE), 20 (TRACE) mg/dL    Glucose, Urine Normal Normal mg/dL    Blood, Urine NEGATIVE NEGATIVE    Ketones, Urine TRACE (A) NEGATIVE mg/dL    Bilirubin, Urine NEGATIVE NEGATIVE    Urobilinogen, Urine Normal Normal mg/dL    Nitrite, Urine NEGATIVE NEGATIVE    Leukocyte Esterase, Urine NEGATIVE NEGATIVE   Urinalysis Microscopic   Result  Value Ref Range    WBC, Urine 1-5 1-5, NONE /HPF    RBC, Urine 1-2 NONE, 1-2, 3-5 /HPF    Squamous Epithelial Cells, Urine 1-9 (SPARSE) Reference range not established. /HPF    Mucus, Urine FEW Reference range not established. /LPF    Hyaline Casts, Urine 1+ (A) NONE /LPF   Tacrolimus level   Result Value Ref Range    Tacrolimus  4.9 <=15.0 ng/mL   Sirolimus level   Result Value Ref Range    Sirolimus  4.3 4.0 - 20.0 ng/mL   CBC and Auto Differential   Result Value Ref Range    WBC 6.5 4.4 - 11.3 x10*3/uL    nRBC 0.6 (H) 0.0 - 0.0 /100 WBCs    RBC 4.10 4.00 - 5.20 x10*6/uL    Hemoglobin 11.0 (L) 12.0 - 16.0 g/dL    Hematocrit 31.7 (L) 36.0 - 46.0 %    MCV 77 (L) 80 - 100 fL    MCH 26.8 26.0 - 34.0 pg    MCHC 34.7 32.0 - 36.0 g/dL    RDW 14.6 (H) 11.5 - 14.5 %    Platelets 205 150 - 450 x10*3/uL    Neutrophils % 83.4 40.0 - 80.0 %    Immature Granulocytes %, Automated 1.4 (H) 0.0 - 0.9 %    Lymphocytes % 9.1 13.0 - 44.0 %    Monocytes % 5.7 2.0 - 10.0 %    Eosinophils % 0.2 0.0 - 6.0 %    Basophils % 0.2 0.0 - 2.0 %    Neutrophils Absolute 5.38 1.20 - 7.70 x10*3/uL    Immature Granulocytes Absolute, Automated 0.09 0.00 - 0.70 x10*3/uL    Lymphocytes Absolute 0.59 (L) 1.20 - 4.80 x10*3/uL    Monocytes Absolute 0.37 0.10 - 1.00 x10*3/uL    Eosinophils Absolute 0.01 0.00 - 0.70 x10*3/uL    Basophils Absolute 0.01 0.00 - 0.10 x10*3/uL   Renal Function Panel   Result Value Ref Range    Glucose 193 (H) 74 - 99 mg/dL    Sodium 130 (L) 136 - 145 mmol/L    Potassium 4.1 3.5 - 5.3 mmol/L    Chloride 98 98 - 107 mmol/L    Bicarbonate 16 (L) 21 - 32 mmol/L    Anion Gap 20 10 - 20 mmol/L    Urea Nitrogen 42 (H) 6 - 23 mg/dL    Creatinine 2.08 (H) 0.50 - 1.05 mg/dL    eGFR 32 (L) >60 mL/min/1.73m*2    Calcium 8.6 8.6 - 10.6 mg/dL    Phosphorus 4.4 2.5 - 4.9 mg/dL    Albumin 3.9 3.4 - 5.0 g/dL   Magnesium   Result Value Ref Range    Magnesium 1.81 1.60 - 2.40 mg/dL   POCT GLUCOSE   Result Value Ref Range    POCT Glucose 174 (H) 74  - 99 mg/dL   Renal artery duplex complete   Result Value Ref Range    BSA 2.06 m2   POCT GLUCOSE   Result Value Ref Range    POCT Glucose 180 (H) 74 - 99 mg/dL          ASSESSMENT AND PLAN:   33-year-old female with a medical history of heart transplant March 2022, with a postoperative course complicated by upper extremity/IJ DVTs, 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  Was noted to have markedly reduced ejection fraction on the bedside echocardiogram with an EF of ~35%.  [place]-Estevan catheter was placed at bedside  and confirmed elevated filling pressures and low cardiac index, (1.58 on 0.375 mcg/kg/min milrinone) concerning for cardiogenic shock.  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  Plan for EMBx today with Dr. Cornell     Neuro:  # Anxiety. Depression, Acute pain   - Serial neuro and pain assessments   - PO Tylenol PRN for pain  - PT/OT Consult, OOB to chair  - CAM ICU score every shift  - Sleep/wake cycle normalization     # Physical Status  -BMI 41. Obese     #Substance abuse  -Denies EtOH, tobacco and illicit drug use.     Cardiovascular:  # HFrEF 2/2 NICM s/p OHT (3/31/2022)  # Cardiogenic shock  # Possible organ rejection  - TTE 11/29: LVEF 60-65%, Abnormal septal motion consistent with post-operative status, There is reduced right ventricular systolic function, RVSP within normal limits.  - admit weight 99kg  - admit BNP >5000  - Lactate on admit: 2.4 continue to trend   - Initiated pulse IV steroid 1000 mg daily x3 days   -opening swan numbers: 103/76 (84), CVP 13, PAP 32/24 (27), CO/CI 4.2/2.14, SVR 1352, SVO2 56% on milrinone 0.375mcgkg/min  and Nipride 1mcg/kg.min    - Daily standing weights, 2gm sodium diet, 2L fluid restriction, strict  I&Os     #S/p heart transplant 3/31/2022  -indication NICM HFrEF PPCM s/p cardioMEMs/ICD  -induction basiliximab  -donor HCV -, toxo -/-, CMV -/+     Rejection history and DSAs: pAMR1 (7/20/22) with preserved graft function and normal resting hemodynamics. Immunosuppression escalated, negative DSA. Repeat EMBx (8/10/22) without rejection. MMF induced colitis so transitioned to AZA. Then found to have asymptomatic 2R ACR (11/9/22) with stable hemodynamics and graft function; treated with IV steroids, optimization of tacrolimus dosing and increase of azathioprine. AZA then switched to sirolimus in light of worsening renal function.   DSA: 9/15/22: Negative  DSA: 12/14/22: Negative     #Immunosuppression  MMF stopped due to colitis  AZA stopped due to worsening renal function and rejection episode  -c/w home Sirolimus 0.5mg PO daily and Tacrolimus 1.5mg PO BID.   -Goal FK 3-5, Goal Sirolimus 7-9  -Oral prednisone on hold. Initiated 3 day course of IV pulse dose steroids 2/15/24  -target sum total sirolimus and tacrolimus (10-12)   -any changes to dosing should be made on 2/17.  Tac level (2/16): 4.9   - Sirolimus level (2/16): 4.3   -Normally takes prednisone 5 mg daily (pred for life given rejection episodes both ACR & AMR)   -monitor DSAs closely     #Prophylaxis  -ASA 81mg daily  -c/w home Rosuvastatin 40mg PO daily and vascepa 1g BID  -Ca/D, PPI     #HTN  - C/w home amlodipine 2.5mg daily     #Electrolyte Disturbances  -Maintain K+ >4.     #hypomagnesemia  -Continue with daily magnesium 800mg   -trend magnesium daily. Maintain magnesium >2     Pulmonary:   -No Hx of pulmonary disease/Pulmonary Hypertension  -Monitor and maintain SpO2 > 92%        GI:  #PMH gastric bypass and MMF colitis  - C/w Senna S for prophylaxis  - Miralax prn  - c/w home PPI     :  #PMH of persistent HIRAM-d/t medication use. Significant HIRAM today  -Baseline BUN/Cr 1.1-1.2  -Admit BUN/Cr 41/2.12  -Renal artery US pending: Renal Artery Duplex:  The aorta is not visualized distal. Right Renal Artery: Right renal arteries demonstrate no evidence of hemodynamically significant stenosis. The right renal artery demonstrates a high resistive flow pattern. Left Renal Artery: Technically dificult. Left renal artery difficult to visualize. Low high resistive flow noted. The distal renal artery is not visualized. Difficult to visualize parenchyma flow, however appears to be low high resistive flow.  -Urine lytes pending  -I/Os  -avoid hypotension and nephrotoxic agents     Heme:  #Iron deficiency anemia  - Labs: CBC 11.0/31.8, TIBC 326, ferritin 308, serum Fe 42, folate 23.9, B12 1,179, % sat 13     - C/w PO iron  -Defer venofer due to septic shock vs. Cardiogenic shock.      # h/o Leukopenia and neutropenia which required Neupogen  WBC 8.0 on 2/16  -daily CBC     # PMH of RIJ DVTs, persistent superficial RUE basilic vein thrombus.   -Anticoagulation stopped (4/27/22).   -prophylactic anticoagulation with heparin SQ        Endo:  #No PMH of DM.   - Mild SSI secondary to pulse dose steroids.  - hypoglycemia protocol as necessary  - hgbA1c 6.3     #Hypothyroidism s/p thyroidectomy  -TSH 12.02, Free T4 2.47  -c/w home synthroid 200mcg daily  -Endocrine consult in AM      ID:  #Sepsis vs. Cardiogenic shock.    -no s/s infx. Afebrile. No leukocytosis.   -trend temps q4h  -Given a dose of zosyn and vancomycin in the ED.   -Blood Cx pending  - c/w vanc/zosyn for now         PHYSICAL AND OCCUPATIONAL THERAPY: Ordered     LINES:  PIVs   A line R radial (placed 2/16)  Central/Latex Free RIJ [place] (Placed 2/16)        DVT: SCDs  VAP BUNDLE: N/a  ULCER PPX: PPI  GLYCEMIC CONTROL: SSI  BOWEL CARE: Colace and miralax PRN  INDWELLING CATHETER: n/a  NUTRITION: Adult diet Regular        EMERGENCY CONTACT: Extended Emergency Contact Information  Primary Emergency Contact: Fani Geller  Home Phone: [phone]  Relation: Parent  FAMILY UPDATE:  Pt's aunt updated at bedside on  admission   CODE STATUS: Full Code  DISPO: Admit to HFICU    Patient seen and assessed with Dr. Albert Ta, APRN-CNP

## 2024-02-16 NOTE — PROGRESS NOTES
"Vancomycin Dosing by Pharmacy- INITIAL    Charla MONTELONGO Yimi Bowles is a 33 y.o. year old female who Pharmacy has been consulted for vancomycin dosing for pneumonia. Based on the patient's indication and renal status this patient will be dosed based on a goal trough/random level of 15-20.     Renal function is currently but still unstable.    Visit Vitals  BP 93/51   Pulse (!) 130   Temp 37.1 °C (98.8 °F)   Resp (!) 28        Lab Results   Component Value Date    CREATININE 2.08 (H) 02/16/2024    CREATININE 2.12 (H) 02/16/2024    CREATININE 2.26 (H) 02/15/2024    CREATININE 1.27 (H) 02/09/2024        Patient weight is 99 kg    No results found for: \"CULTURE\"     I/O last 3 completed shifts:  In: 836.7 (8.5 mL/kg) [I.V.:136.7 (1.4 mL/kg); IV Piggyback:700]  Out: 775 (7.8 mL/kg) [Urine:775 (0.2 mL/kg/hr)]  Weight: 99 kg   [unfilled]    Lab Results   Component Value Date    PATIENTTEMP 37.0 02/16/2024    PATIENTTEMP 37.0 02/16/2024    PATIENTTEMP 37.0 02/15/2024          Assessment/Plan     Patient has already been given a loading dose of 2000 mg. Due to patients unstable renal function, we will dose by level and get a 24H level.  Follow-up level will be ordered on 2/17 at 0030 unless clinically indicated sooner.  Will continue to monitor renal function daily while on vancomycin and order serum creatinine at least every 48 hours if not already ordered.  Follow for continued vancomycin needs, clinical response, and signs/symptoms of toxicity.       Omar Layne, PharmD       "

## 2024-02-16 NOTE — CARE PLAN
Problem: Skin  Goal: Decreased wound size/increased tissue granulation at next dressing change  Flowsheets (Taken 2/16/2024 0226)  Decreased wound size/increased tissue granulation at next dressing change: Promote sleep for wound healing  Goal: Participates in plan/prevention/treatment measures  Flowsheets (Taken 2/16/2024 0226)  Participates in plan/prevention/treatment measures:   Elevate heels   Increase activity/out of bed for meals  Goal: Prevent/manage excess moisture  Flowsheets (Taken 2/16/2024 0226)  Prevent/manage excess moisture: Monitor for/manage infection if present   The patient's goals for the shift include      The clinical goals for the shift include HDS

## 2024-02-16 NOTE — PROGRESS NOTES
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  Was noted to have markedly reduced ejection fraction on the bedside echocardiogram with an EF of 35% on my estimation.    [place]-Estevan catheter placed at bedside confirmed elevated filling pressures, low cardiac index, (1.58 on 0.375 mcg/kg/min milrinone) consistent with cardiogenic shock.    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    Plan:  -Will diurese, reduce afterload with a combination of milrinone/nitroprusside drip, and monitor serial lactates/endorgan function  -Improvement in cardiac indices with the above interventions-no indication for mechanical support device at this point.  Will continue to monitor  -Empiric intravenous Solu-Medrol administered.  1 g x 3 doses.  Underwent endomyocardial biopsy today-results pending  -Continue home tacrolimus/sirolimus.    This critically ill patient continues to be at-risk for clinically significant deterioration / failure due to the above mentioned dysfunctional, unstable organ systems.  I have personally identified and managed all complex critical care issues to prevent aforementioned clinical deterioration.  Critical care time is spent at bedside and/or the immediate area and has included, but is not limited to, the review of diagnostic tests, labs, radiographs, serial assessments of hemodynamics, respiratory status, ventilatory management, and family updates.  Time spent in procedures and teaching are reported separately.    Critical care time: 90 minutes    Fabrice Pirce MD  Advanced Heart Failure/Transplant Cardiology  Cardio-Oncology  [facility] " and Vascular [place]

## 2024-02-16 NOTE — PROGRESS NOTES
ON CALL ENTRY:    Call received at 9:04pm that “something was wrong.” Ms. Dunham states she had been trying to call for several hours, no calls regarding this patient received prior to 9:04pm.     Reported symptoms of nausea, vomiting, and stomach tightness since Monday 9/12/24.    Ms. Dunham stated she was in the emergency room at [facility] and requested Dr. Cornell be called at home to assess her. Advised that Dr. Matt hager is not on service at this time and the Attending on service, Dr. Price would be notified of her admission.     Advised that the transplant on call line is for emergencies and urgent outpatient needs only, advised that while in the emergency room or any other inpatient setting all questions be directed to the inpatient team.     Ms. Dunham stated the on call line was “no help” and hung up before further advice could be give n.     Dr. Price notified of above case via [employer] [facility] secure chat and will review tomorrow AM on inpatient rounds.

## 2024-02-16 NOTE — POST-PROCEDURE NOTE
Physician Transition of Care Summary  Invasive Cardiovascular Lab    Procedure Date: 2/16/2024  Attending:    * Geovanna Kitchen - Primary     * Carl B Gillombardo  Resident/Fellow/Other Assistant: Surgeon(s) and Role:     * Brionna Stiles MD - Fellow     * Carl B Gillombardo, MD    Indications:   Pre-op Diagnosis     * Cardiogenic shock (CMS/HCC) [R57.0]     * Encounter for aftercare following heart transplant (CMS/HCC) [Z48.21]    Post-procedure diagnosis:   Post-op Diagnosis     * Cardiogenic shock (CMS/HCC) [R57.0]     * Encounter for aftercare following heart transplant (CMS/HCC) [Z48.21]    Procedure(s):   Right Heart Cath  54457 - [place] RIGHT HEART CATH O2 SATURATION & CARDIAC OUTPUT        Procedure Findings:   Elevated PA and cardiac filling pressures with preserved cardiac output and index on inotropes and vasoactive medications   Status post successful endomyocardial biopsy    Description of the Procedure:   Right internal jugular access, previous 8.5 Ma[place] sheath was exchanged, we then performed endomyocardial biopsy in the usual fashion after which we placed a [employer] catheter  University-[employer]an catheter was secured at 50 cm from the hub of the sheath    Complications:   none    Stents/Implants:   Cardiovascular Implants       Pacemaker    Lead, Pacing, Cardiac, Temporary, W/Suture, Flexon, 2-0, 24 Case [id number] - Implanted        Model/Cat number: [id number] : [employer]    Lot number: [id number] Implant Date: 3/31/2022    Description: Converted from [facility]. Please see archived information for full log information.        As of 9/1/2023       Status: Unknown                      Lead, Pacing, Myocardial, Bipolar, Temporary Case [id number] - Implanted        Model/Cat number: [id number] : [employer]    Implant Date: 3/31/2022 Description: Converted from [facility]. Please see archived information for full log information.      As of 9/1/2023       Status: Unknown                       Other Cardiac Implant    Delivery System, Cardiomems Pa Sensor, Includes [id number], [id number]/22/2020 - Implanted        Inventory item: DELIVERY SYSTEM, CARDIOMEMS PA SENSOR, INCLUDES [id number], [id number] Model/Cat number: CM PATIENT SYSTEM    Serial number: [id number] : [employer]    Lot number: [id number]        As of 11/13/2023       Status: Unknown                As of 5/22/2020       Status: Implanted                              Anticoagulation/Antiplatelet Plan:   NA    Estimated Blood Loss:   * No values recorded between 2/16/2024 10:42 AM and 2/16/2024 11:30 AM *    Anesthesia: Moderate Sedation Anesthesia Staff: No anesthesia staff entered.    Any Specimen(s) Removed:   No specimens collected during this procedure.    Disposition:   Return to [facility]      Electronically signed by: Carl B Gillombardo, MD, 2/16/2024 11:30 AM

## 2024-02-16 NOTE — PROGRESS NOTES
Physical Therapy                 Therapy Communication Note    Patient Name: Charla Bowles  MRN: [id number]  Today's Date: 2/16/2024     Discipline: Physical Therapy    Missed Visit Reason: Missed Visit Reason:  (HOLD today as per HF team. currently working patient up for possible rejection, pending biopsy today. will evaluate when medically appropriate.)    Missed Time: Attempt    Comment:

## 2024-02-16 NOTE — POST-PROCEDURE NOTE
The patient was prepped and draped in the usual sterile manner using chlorhexidine scrub. 1% lidocaine was used to numb the region. The RIGHT radial artery was palpated and successfully cannulated on the first pass. Pulsatile, arterial blood was visualized and the artery was then threaded using the Seldinger technique and a catheter was then sutured into place. Good wave-form was obtained. The patient tolerated the procedure well without any immediate complications. The area was cleaned and Tegaderm was applied.

## 2024-02-16 NOTE — ED PROCEDURE NOTE
Procedure    Performed by: Kaden Casey MD  Authorized by: Deysi Rodriguez MD  Cardiac Indications: shock              Respiratory Indications: shortness of breath and tachypnea  Procedure: Cardiac Ultrasound    Findings:   Views: parasternal long and parasternal short  The pericardial space was visualized and was NEGATIVE for a significant pericardial effusion.  Activity: Ventricular contractions were visualized.  LV: LV systolic function was DECREASED.      Impression:  Cardiac: The focused cardiac ultrasound exam had ABNORMAL findings as specified.               Kaden Casey MD  02/16/24 [id number]

## 2024-02-16 NOTE — ED PROCEDURE NOTE
Procedure  Critical Care    Performed by: Kaden Casey MD  Authorized by: Kaden Casey MD    Critical care provider statement:     Critical care time (minutes):  45    Critical care time was exclusive of:  Separately billable procedures and treating other patients and teaching time    Critical care was necessary to treat or prevent imminent or life-threatening deterioration of the following conditions:  Cardiac failure and shock    Critical care was time spent personally by me on the following activities:  Discussions with consultants, development of treatment plan with patient or surrogate, examination of patient, obtaining history from patient or surrogate, ordering and performing treatments and interventions, ordering and review of laboratory studies, ordering and review of radiographic studies, pulse oximetry and re-evaluation of patient's condition    Care discussed with: admitting provider               Kaden Casey MD  02/16/24 [id number]

## 2024-02-16 NOTE — ED TRIAGE NOTES
PT presents to ED via triage for chief complaint of multiple medical complaints. PT states she has been having difficulty keeping foods and liquids down and SOB. PT also endorsing generalized body pain, chills, and fever. PT has a history of heart transplant in March 31st, 2022. PT on the kidney transplant, not on dialysis.   PT is [employer] and ambulates on her own
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
## 2024-02-16 NOTE — PROGRESS NOTES
SOCIAL WORK NOTE   SW met with patient and family at bedside for assessment (please see flowsheets for further details). Patient normally lives at home with minor children (confirmed safe). Her mother is HHA through waiver program 28 hours/week. She usually helps with IADLs and patient is typically independent with ADLs. Currently denied needs for social work. Social work to follow.   CHARLENE Ocampo, LISW-S ([id number])  .

## 2024-02-16 NOTE — POST-PROCEDURE NOTE
A time out was performed. The patients right neck region was prepped and draped in a sterile fashion using chlorhexidine scrub. Anesthia was achieved with 1% lidocaine. The right internal jugular vein was accessed using a micropuncture needle and sheath. Venous blood was withdrawn and the sheath was advanced into the vein and the needle was withdrawn. A larger guidewire was advanced through the sheath. A small incision was made with a 10 blade scalpel and the sheath was exchanged for a dilator with overlying Occ[place] #8 catheter over the guidewire until appropriate dilation was obtained. Dilator was flushed appropriately, taking care to first withdraw any free air in system. We manipulated a Latex Free [place]-Estevan catheter through the venous system, to the pulmonary capillary wedge position monitoring for appropriate RA and RV wave-forms. No ectopy was noted on the monitor. A Wedge pressure was unable to be obtained. We reviewed the data and felt that it was adequate. We cleaned and dressed the access site. Post procedure chest x-ray was ordered and will be reviewed for correct placement and signs of pneumothorax. Patient tolerated the procedure well.     [place] was locked at: 54cm    Opening swan numbers:  /86  MAP 96  CVP 10  PAP 36/21 (26)  PAWP Unable to obtain  SVR 1943  CO/CI (F) 3.54/1.81  SVO2 54%  THERMO 3.09/1.58  Medications: 0.375mcg/kg/min milrinone.

## 2024-02-16 NOTE — H&P (VIEW-ONLY)
ON CALL ENTRY:    Call received at 9:04pm that “something was wrong.” Ms. Dunham states she had been trying to call for several hours, no calls regarding this patient received prior to 9:04pm.     Reported symptoms of nausea, vomiting, and stomach tightness since Monday 9/12/24.    Ms. Dunham stated she was in the emergency room at [facility] and requested Dr. Cornell be called at home to assess her. Advised that Dr. Matt hager is not on service at this time and the Attending on service, Dr. Price would be notified of her admission.     Advised that the transplant on call line is for emergencies and urgent outpatient needs only, advised that while in the emergency room or any other inpatient setting all questions be directed to the inpatient team.     Ms. Dunham stated the on call line was “no help” and hung up before further advice could be give n.     Dr. Price notified of above case via [employer] secure chat and will review tomorrow AM on inpatient rounds.

## 2024-02-17 NOTE — PROGRESS NOTES
"Charla Bowles is a 33 y.o. female on day 2 of admission presenting with Cardiogenic shock (CMS/HCC).    Subjective   Patient hemodynamically stable on Milrinone and Nipride gtt.  She reports feeling better than when she first came in.  She has no c/o chest pain or SOB.  She still has c/o nausea and dry heaving.    Update:      Patient continues to show signs of rejection with worsening renal function and borderline hemodynamics . Plan to place Trialysis line and start Dobutamine in addition to Milrinone and consult Transfusion Medicine to begin Plex and IVIG tonight.      Plan:   - CMP, lactate Q6 h  - ECHO stat  - Nazario 2 g   - Ondansetron 4mg IV q6PRN  - Protonix increased to 2x/day   - Trialysis placement  - start Dobutamine   - Ca 4g replaced      Objective     Physical Exam  HENT:      Head: Normocephalic and atraumatic.      Mouth/Throat:      Mouth: Mucous membranes are moist.   Eyes:      Pupils: Pupils are equal, round, and reactive to light.   Cardiovascular:      Rate and Rhythm: Regular rhythm. Tachycardia present.      Heart sounds: Normal heart sounds, S1 normal and S2 normal. Heart sounds not distant. No murmur heard.     No friction rub. No gallop.   Pulmonary:      Effort: Pulmonary effort is normal.      Breath sounds: Normal breath sounds and air entry.   Abdominal:      General: Abdomen is flat.      Palpations: Abdomen is soft.   Musculoskeletal:      Cervical back: Normal range of motion.   Skin:     General: Skin is warm and dry.      Capillary Refill: Capillary refill takes less than 2 seconds.   Neurological:      General: No focal deficit present.      Mental Status: She is alert.   Psychiatric:         Attention and Perception: Attention normal.         Speech: Speech normal.         Behavior: Behavior normal.         Last Recorded Vitals  Blood pressure 93/51, pulse (!) 135, temperature 37 °C (98.6 °F), resp. rate (!) 40, height 1.549 m (5' 1\"), weight 99.5 kg (219 lb 5.7 oz), SpO2 " 97 %.  PAP: (17-47)/(7-32) 36/22  CVP:  [9 mmHg-11 mmHg] 11 mmHg  PCWP:  [18 mmHg] 18 mmHg  CO:  [4.5 L/min-4.7 L/min] 4.5 L/min  CI:  [2.3 L/min/m2-2.4 L/min/m2] 2.3 L/min/m2     Intake/Output last 3 Shifts:  I/O last 3 completed shifts:  In: 2467 (24.8 mL/kg) [I.V.:867 (8.7 mL/kg); IV Piggyback:1600]  Out: 2180 (21.9 mL/kg) [Urine:2175 (0.6 mL/kg/hr); Blood:5]  Weight: 99.5 kg     Relevant Results  Results for orders placed or performed during the hospital encounter of 02/15/24 (from the past 24 hour(s))   BLOOD GAS MIXED VENOUS FULL PANEL   Result Value Ref Range    POCT pH, Mixed 7.37 7.33 - 7.43 pH    POCT pCO2, Mixed 33 (L) 41 - 51 mm Hg    POCT pO2, Mixed 36 35 - 45 mm Hg    POCT SO2, Mixed 54 45 - 75 %    POCT Oxy Hemoglobin, Mixed 53.5 45.0 - 75.0 %    POCT Hematocrit Calculated, Mixed 32.0 (L) 36.0 - 46.0 %    POCT Sodium, Mixed 129 (L) 136 - 145 mmol/L    POCT Potassium, Mixed 4.0 3.5 - 5.3 mmol/L    POCT Chloride, Mixed 99 98 - 107 mmol/L    POCT Ionized Calcium, Mixed 1.09 (L) 1.10 - 1.33 mmol/L    POCT Glucose, Mixed 228 (H) 74 - 99 mg/dL    POCT Lactate, Mixed 1.6 0.4 - 2.0 mmol/L    POCT Base Excess, Mixed -5.4 (L) -2.0 - 3.0 mmol/L    POCT HCO3 Calculated, Mixed 19.1 (L) 22.0 - 26.0 mmol/L    POCT Hemoglobin, Mixed 10.6 (L) 12.0 - 16.0 g/dL    POCT Anion Gap, Mixed 15 10 - 25 mmo/L    Patient Temperature 37.0 degrees Celsius    FiO2 0 %   BLOOD GAS MIXED VENOUS FULL PANEL   Result Value Ref Range    POCT pH, Mixed 7.41 7.33 - 7.43 pH    POCT pCO2, Mixed 33 (L) 41 - 51 mm Hg    POCT pO2, Mixed 37 35 - 45 mm Hg    POCT SO2, Mixed 57 45 - 75 %    POCT Oxy Hemoglobin, Mixed 56.0 45.0 - 75.0 %    POCT Hematocrit Calculated, Mixed 31.0 (L) 36.0 - 46.0 %    POCT Sodium, Mixed 129 (L) 136 - 145 mmol/L    POCT Potassium, Mixed 3.8 3.5 - 5.3 mmol/L    POCT Chloride, Mixed 100 98 - 107 mmol/L    POCT Ionized Calcium, Mixed 1.02 (L) 1.10 - 1.33 mmol/L    POCT Glucose, Mixed 237 (H) 74 - 99 mg/dL    POCT  Lactate, Mixed 1.8 0.4 - 2.0 mmol/L    POCT Base Excess, Mixed -3.1 (L) -2.0 - 3.0 mmol/L    POCT HCO3 Calculated, Mixed 20.9 (L) 22.0 - 26.0 mmol/L    POCT Hemoglobin, Mixed 10.3 (L) 12.0 - 16.0 g/dL    POCT Anion Gap, Mixed 12 10 - 25 mmo/L    Patient Temperature 37.0 degrees Celsius    FiO2 21 %   Vancomycin, Trough   Result Value Ref Range    Vancomycin, Trough 14.0 5.0 - 20.0 ug/mL   Tacrolimus level   Result Value Ref Range    Tacrolimus  3.8 <=15.0 ng/mL   Sirolimus level   Result Value Ref Range    Sirolimus  3.5 (L) 4.0 - 20.0 ng/mL   CBC and Auto Differential   Result Value Ref Range    WBC 7.2 4.4 - 11.3 x10*3/uL    nRBC 2.9 (H) 0.0 - 0.0 /100 WBCs    RBC 3.42 (L) 4.00 - 5.20 x10*6/uL    Hemoglobin 9.5 (L) 12.0 - 16.0 g/dL    Hematocrit 27.4 (L) 36.0 - 46.0 %    MCV 80 80 - 100 fL    MCH 27.8 26.0 - 34.0 pg    MCHC 34.7 32.0 - 36.0 g/dL    RDW 14.8 (H) 11.5 - 14.5 %    Platelets 196 150 - 450 x10*3/uL    Neutrophils % 89.0 40.0 - 80.0 %    Immature Granulocytes %, Automated 1.1 (H) 0.0 - 0.9 %    Lymphocytes % 5.3 13.0 - 44.0 %    Monocytes % 4.5 2.0 - 10.0 %    Eosinophils % 0.0 0.0 - 6.0 %    Basophils % 0.1 0.0 - 2.0 %    Neutrophils Absolute 6.39 1.20 - 7.70 x10*3/uL    Immature Granulocytes Absolute, Automated 0.08 0.00 - 0.70 x10*3/uL    Lymphocytes Absolute 0.38 (L) 1.20 - 4.80 x10*3/uL    Monocytes Absolute 0.32 0.10 - 1.00 x10*3/uL    Eosinophils Absolute 0.00 0.00 - 0.70 x10*3/uL    Basophils Absolute 0.01 0.00 - 0.10 x10*3/uL   Magnesium   Result Value Ref Range    Magnesium 2.84 (H) 1.60 - 2.40 mg/dL   Comprehensive metabolic panel   Result Value Ref Range    Glucose 300 (H) 74 - 99 mg/dL    Sodium 129 (L) 136 - 145 mmol/L    Potassium 3.8 3.5 - 5.3 mmol/L    Chloride 97 (L) 98 - 107 mmol/L    Bicarbonate 19 (L) 21 - 32 mmol/L    Anion Gap 17 10 - 20 mmol/L    Urea Nitrogen 49 (H) 6 - 23 mg/dL    Creatinine 2.48 (H) 0.50 - 1.05 mg/dL    eGFR 26 (L) >60 mL/min/1.73m*2    Calcium 8.0 (L) 8.6 -  10.6 mg/dL    Albumin 3.4 3.4 - 5.0 g/dL    Alkaline Phosphatase 62 33 - 110 U/L    Total Protein 6.1 (L) 6.4 - 8.2 g/dL     (H) 9 - 39 U/L    Bilirubin, Total 0.8 0.0 - 1.2 mg/dL     (H) 7 - 45 U/L   Phosphorus   Result Value Ref Range    Phosphorus 4.0 2.5 - 4.9 mg/dL   Type and Screen   Result Value Ref Range    ABO TYPE O     Rh TYPE POS     ANTIBODY SCREEN NEG    Fibrinogen   Result Value Ref Range    Fibrinogen 219 200 - 400 mg/dL   Calcium, ionized   Result Value Ref Range    POCT Calcium, Ionized 0.95 (L) 1.1 - 1.33 mmol/L   Coagulation Screen   Result Value Ref Range    Protime 15.6 (H) 9.8 - 12.8 seconds    INR 1.4 (H) 0.9 - 1.1    aPTT 25 (L) 27 - 38 seconds   BLOOD GAS MIXED VENOUS FULL PANEL   Result Value Ref Range    POCT pH, Mixed 7.39 7.33 - 7.43 pH    POCT pCO2, Mixed 33 (L) 41 - 51 mm Hg    POCT pO2, Mixed 41 35 - 45 mm Hg    POCT SO2, Mixed 58 45 - 75 %    POCT Oxy Hemoglobin, Mixed 57.1 45.0 - 75.0 %    POCT Hematocrit Calculated, Mixed 28.0 (L) 36.0 - 46.0 %    POCT Sodium, Mixed 129 (L) 136 - 145 mmol/L    POCT Potassium, Mixed 3.6 3.5 - 5.3 mmol/L    POCT Chloride, Mixed 99 98 - 107 mmol/L    POCT Ionized Calcium, Mixed 1.00 (L) 1.10 - 1.33 mmol/L    POCT Glucose, Mixed 307 (H) 74 - 99 mg/dL    POCT Lactate, Mixed 1.7 0.4 - 2.0 mmol/L    POCT Base Excess, Mixed -4.4 (L) -2.0 - 3.0 mmol/L    POCT HCO3 Calculated, Mixed 20.0 (L) 22.0 - 26.0 mmol/L    POCT Hemoglobin, Mixed 9.3 (L) 12.0 - 16.0 g/dL    POCT Anion Gap, Mixed 14 10 - 25 mmo/L    Patient Temperature 37.0 degrees Celsius    FiO2 21 %   POCT GLUCOSE   Result Value Ref Range    POCT Glucose 307 (H) 74 - 99 mg/dL   Transthoracic Echo (TTE) Limited   Result Value Ref Range    LVOT diam 1.70 cm    LV biplane EF 42 %    LA vol index A/L 51.6 ml/m2    Tricuspid annular plane systolic excursion 0.7 cm    RV free wall pk S' 9.14 cm/s    LVIDd 3.80 cm    RVSP 34.2 mmHg    LV A4C EF 48.6    Comprehensive metabolic panel   Result  Value Ref Range    Glucose 268 (H) 74 - 99 mg/dL    Sodium 130 (L) 136 - 145 mmol/L    Potassium 3.6 3.5 - 5.3 mmol/L    Chloride 95 (L) 98 - 107 mmol/L    Bicarbonate 20 (L) 21 - 32 mmol/L    Anion Gap 19 10 - 20 mmol/L    Urea Nitrogen 52 (H) 6 - 23 mg/dL    Creatinine 2.74 (H) 0.50 - 1.05 mg/dL    eGFR 23 (L) >60 mL/min/1.73m*2    Calcium 8.7 8.6 - 10.6 mg/dL    Albumin 3.9 3.4 - 5.0 g/dL    Alkaline Phosphatase 60 33 - 110 U/L    Total Protein 6.6 6.4 - 8.2 g/dL     (H) 9 - 39 U/L    Bilirubin, Total 0.8 0.0 - 1.2 mg/dL     (H) 7 - 45 U/L   Lactate   Result Value Ref Range    Lactate 2.0 0.4 - 2.0 mmol/L   BLOOD GAS MIXED VENOUS FULL PANEL   Result Value Ref Range    POCT pH, Mixed 7.40 7.33 - 7.43 pH    POCT pCO2, Mixed 33 (L) 41 - 51 mm Hg    POCT pO2, Mixed 38 35 - 45 mm Hg    POCT SO2, Mixed 54 45 - 75 %    POCT Oxy Hemoglobin, Mixed 52.8 45.0 - 75.0 %    POCT Hematocrit Calculated, Mixed 29.0 (L) 36.0 - 46.0 %    POCT Sodium, Mixed 128 (L) 136 - 145 mmol/L    POCT Potassium, Mixed 3.7 3.5 - 5.3 mmol/L    POCT Chloride, Mixed 97 (L) 98 - 107 mmol/L    POCT Ionized Calcium, Mixed 1.12 1.10 - 1.33 mmol/L    POCT Glucose, Mixed 272 (H) 74 - 99 mg/dL    POCT Lactate, Mixed 1.9 0.4 - 2.0 mmol/L    POCT Base Excess, Mixed -3.8 (L) -2.0 - 3.0 mmol/L    POCT HCO3 Calculated, Mixed 20.4 (L) 22.0 - 26.0 mmol/L    POCT Hemoglobin, Mixed 9.8 (L) 12.0 - 16.0 g/dL    POCT Anion Gap, Mixed 14 10 - 25 mmo/L    Patient Temperature 37.0 degrees Celsius    FiO2 21 %   POCT GLUCOSE   Result Value Ref Range    POCT Glucose 270 (H) 74 - 99 mg/dL   Calcium, ionized   Result Value Ref Range    POCT Calcium, Ionized 1.03 (L) 1.1 - 1.33 mmol/L   Prepare Plasma Exchange: 16 Units   Result Value Ref Range    PRODUCT CODE P3043N79     Unit Number [id number]     Unit ABO O     Unit RH POS     Dispense Status IS     Blood Expiration Date February 22, 2024 15:54 EST     PRODUCT BLOOD TYPE 5100     UNIT VOLUME 205      PRODUCT CODE C4481J09     Unit Number [id number]     Unit ABO O     Unit RH POS     Dispense Status IS     Blood Expiration Date February 22, 2024 15:54 EST     PRODUCT BLOOD TYPE 5100     UNIT VOLUME 217     PRODUCT CODE [id number]     Unit Number [id number]     Unit ABO O     Unit RH POS     Dispense Status IS     Blood Expiration Date February 22, 2024 15:54 EST     PRODUCT BLOOD TYPE 5100     UNIT VOLUME 216     PRODUCT CODE [id number]     Unit Number [id number]     Unit ABO O     Unit RH POS     Dispense Status IS     Blood Expiration Date February 22, 2024 15:57 EST     PRODUCT BLOOD TYPE 5100     UNIT VOLUME 203     PRODUCT CODE K7563D60     Unit Number [id number]     Unit ABO O     Unit RH POS     Dispense Status IS     Blood Expiration Date February 22, 2024 15:17 EST     PRODUCT BLOOD TYPE 5100     UNIT VOLUME 308     PRODUCT CODE Q0094V28     Unit Number [id number]     Unit ABO O     Unit RH POS     Dispense Status IS     Blood Expiration Date February 22, 2024 15:17 EST     PRODUCT BLOOD TYPE 5100     UNIT VOLUME 289     PRODUCT CODE B1570Z55     Unit Number [id number]     Unit ABO O     Unit RH POS     Dispense Status IS     Blood Expiration Date February 22, 2024 15:17 EST     PRODUCT BLOOD TYPE 5100     UNIT VOLUME 305     PRODUCT CODE G8156X76     Unit Number [id number]     Unit ABO O     Unit RH POS     Dispense Status IS     Blood Expiration Date February 22, 2024 15:17 EST     PRODUCT BLOOD TYPE 5100     UNIT VOLUME 321     PRODUCT CODE F6217X11     Unit Number [id number]     Unit ABO O     Unit RH POS     Dispense Status IS     Blood Expiration Date February 22, 2024 15:17 EST     PRODUCT BLOOD TYPE 5100     UNIT VOLUME 313     PRODUCT CODE C3024Z93     Unit Number [id number]     Unit ABO O     Unit RH POS     Dispense Status IS     Blood Expiration Date February 22, 2024 15:17 EST     PRODUCT BLOOD TYPE 5100     UNIT VOLUME 290     PRODUCT CODE [id number]      Unit Number [id number]     Unit ABO O     Unit RH POS     Dispense Status IS     Blood Expiration Date February 22, 2024 15:17 EST     PRODUCT BLOOD TYPE 5100     UNIT VOLUME 330     PRODUCT CODE X9231R62     Unit Number [id number]     Unit ABO O     Unit RH POS     Dispense Status IS     Blood Expiration Date February 22, 2024 15:42 EST     PRODUCT BLOOD TYPE 5100     UNIT VOLUME 294     PRODUCT CODE F1767H76     Unit Number [id number]     Unit ABO O     Unit RH POS     Dispense Status IS     Blood Expiration Date February 22, 2024 15:42 EST     PRODUCT BLOOD TYPE 5100     UNIT VOLUME 310     PRODUCT CODE A1310V53     Unit Number [id number]     Unit ABO O     Unit RH POS     Dispense Status IS     Blood Expiration Date February 22, 2024 15:48 EST     PRODUCT BLOOD TYPE 5100     UNIT VOLUME 297     PRODUCT CODE K7541H51     Unit Number [id number]     Unit ABO O     Unit RH POS     Dispense Status IS     Blood Expiration Date February 22, 2024 15:48 EST     PRODUCT BLOOD TYPE 5100     UNIT VOLUME 316     PRODUCT CODE D2356D41     Unit Number [id number]     Unit ABO O     Unit RH POS     Dispense Status IS     Blood Expiration Date February 22, 2024 15:57 EST     PRODUCT BLOOD TYPE 5100     UNIT VOLUME 300           ASSESSMENT AND PLAN:   33-year-old female with a medical history of heart transplant March 2022, with a postoperative course complicated by upper extremity/IJ DVTs, 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  Was noted to have markedly reduced ejection fraction on the bedside echocardiogram with an EF of ~35%.  Winfield-Estevan catheter was placed at bedside  and confirmed elevated filling pressures and low cardiac index, (1.58 on 0.375 mcg/kg/min milrinone) concerning for cardiogenic shock.  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  EMBx on 2/16 revealed an mild ACR with +C4D's and negative HLS, however patient continued to show signs of multisystem organ failure with elevated transaminases and worsening renal function and borderline hemodynamics despite 0.375mcg/kg/min Milrinone and Nipride gtts.  Decision was made to place a trialysis catheter and begin treatment for AMR with Plex and IVIG.        Neuro:  # Anxiety. Depression, Acute pain   - Serial neuro and pain assessments   - PO Tylenol PRN for pain  - PT/OT Consult, OOB to chair  - CAM ICU score every shift  - Sleep/wake cycle normalization     # Physical Status  -BMI 41. Obese     #Substance abuse  -Denies EtOH, tobacco and illicit drug use.     Cardiovascular:  # HFrEF 2/2 NICM s/p OHT (3/31/2022)  # Cardiogenic shock  # mild ACR with +C4D and negative HLA's, however clinical presentation c/f AMR rejection.     - TTE 11/29: LVEF 60-65%, Abnormal septal motion consistent with post-operative status, There is reduced right ventricular systolic function, RVSP within normal limits.  - admit weight 99kg  - admit BNP >5000  - Lactate on admit: 2.4 continue to trend   - Initiated pulse IV steroid 1000 mg daily x3 days   -opening swan numbers: 103/76 (84), CVP 13, PAP 32/24 (27), CO/CI 4.2/2.14, SVR 1352, SVO2 56% on milrinone 0.375mcgkg/min  and Nipride 1mcg/kg.min    -daily swan numbers (2/17): 80/59, CVP 9, PAP 30/18(23), CO/CI 4.7/2.4, , SVO2 58% on milrinone 0.375mcg/kg/min, and Nipride 1.4mcg/kg/min   -wean Nipride off   - Dobutamine started at 2.5mcg/kg/min   - Prednisone taper over a 6 day period- 60mg, 50 mg, 40 mg, 30 mg, 20 mg, 10 mg  - Plasmapheresis x 5 sessions every other day  beginning 2/17 plus IVIG   - IVIG 100 mg/kg x 4 doses (to be administered AFTER plasmapheresis session 1-4) and one dose of IVIG 1.6 g/kg after the 5th and final plasmapheresis session    -Transfusoin medicine consulted and following   - Daily standing weights, 2gm sodium diet, 2L fluid restriction, strict I&Os     #S/p heart transplant 3/31/2022  -indication NICM HFrEF PPCM s/p cardioMEMs/ICD  -induction basiliximab  -donor HCV -, toxo -/-, CMV -/+     Rejection history and DSAs: pAMR1 (7/20/22) with preserved graft function and normal resting hemodynamics. Immunosuppression escalated, negative DSA. Repeat EMBx (8/10/22) without rejection. MMF induced colitis so transitioned to AZA. Then found to have asymptomatic 2R ACR (11/9/22) with stable hemodynamics and graft function; treated with IV steroids, optimization of tacrolimus dosing and increase of azathioprine. AZA then switched to sirolimus in light of worsening renal function.   DSA: 9/15/22: Negative  DSA: 12/14/22: Negative  DSA: 2/16/24: Negative      #Immunosuppression  MMF stopped due to colitis  AZA stopped due to worsening renal function and rejection episode  -home Sirolimus 0.5mg PO daily and Tacrolimus 1.5mg PO BID.   -Goal FK 3-5, Goal Sirolimus 7-9  -Oral prednisone on hold. Initiated 3 day course of IV pulse dose steroids 2/15/24 ( last dose at 1:30am 2/18)  -target sum total sirolimus and tacrolimus (10-12)   - Tac level (2/17): 3.8  - Sirolimus level (2/17): 3.5   - Sirolimus increased to 1mg PO daily, c/w Tac 1.5mg PO BID  -Normally takes prednisone 5 mg daily (pred for life given rejection episodes both ACR & AMR)   -monitor DSAs closely     #Prophylaxis  -ASA 81mg daily  -c/w home Rosuvastatin 40mg PO daily and vascepa 1g BID  -Ca/D, PPI       #HTN  - C/w home amlodipine 2.5mg daily     #Electrolyte Disturbances  -Maintain K+ >4.     #hypomagnesemia  -Continue with daily magnesium 800mg   -trend magnesium daily. Maintain magnesium >2     Pulmonary:   -No Hx of pulmonary disease/Pulmonary Hypertension  -Monitor and maintain SpO2 > 92%        GI:  #PMH gastric bypass and MMF colitis  - C/w Senna S for prophylaxis  - Miralax  prn  - c/w home PPI     :  #PMH of persistent HIRAM-d/t medication use. Significant HIRAM today  -Baseline BUN/Cr 1.1-1.2  -Admit BUN/Cr 41/2.12  -Renal artery US pending: Renal Artery Duplex: The aorta is not visualized distal. Right Renal Artery: Right renal arteries demonstrate no evidence of hemodynamically significant stenosis. The right renal artery demonstrates a high resistive flow pattern. Left Renal Artery: Technically dificult. Left renal artery difficult to visualize. Low high resistive flow noted. The distal renal artery is not visualized. Difficult to visualize parenchyma flow, however appears to be low high resistive flow.  -Urine lytes pending  -I/Os  -avoid hypotension and nephrotoxic agents     Heme:  #Iron deficiency anemia  - Labs: CBC 11.0/31.8, TIBC 326, ferritin 308, serum Fe 42, folate 23.9, B12 1,179, % sat 13     - C/w PO iron  -Defer venofer due to septic shock vs. Cardiogenic shock.      # h/o Leukopenia and neutropenia which required Neupogen  WBC 8.0 on 2/16  -daily CBC     # PMH of RIJ DVTs, persistent superficial RUE basilic vein thrombus.   -Anticoagulation stopped (4/27/22).   -prophylactic anticoagulation with heparin SQ        Endo:  #Hyperglycemia in the setting on Pulse dose steroids  - hgbA1c 6.3  - Endo consulted and following  - NPH 20 units with pulse dose methylpred  - SSC #2 Regular q6hrs  - hypoglycemia protocol as necessary       #Hypothyroidism s/p thyroidectomy  -TSH 12.02, Free T4 2.47  -c/w home synthroid 200mcg daily  -Endocrine consult in AM      ID:  #Sepsis vs. Cardiogenic shock.    -no s/s infx. Afebrile. No leukocytosis.   -trend temps q4h  -Given a dose of zosyn and vancomycin in the ED.   -Blood Cx pending  - c/w vanc/zosyn for now         PHYSICAL AND OCCUPATIONAL THERAPY: Ordered     LINES:  PIVs   A line R radial (placed 2/16)  Central/Latex Free RIJ [place] (Placed 2/16)  Left internal jugular Trialysis line (placed 2/17)        DVT: SCDs  VAP BUNDLE:  N/a  ULCER PPX: PPI  GLYCEMIC CONTROL: SSI  BOWEL CARE: Colace and miralax PRN  INDWELLING CATHETER: n/a  NUTRITION: Adult diet Regular        EMERGENCY CONTACT: Extended Emergency Contact Information  Primary Emergency Contact: Fani Geller  Home Phone: [phone]  Relation: Parent  FAMILY UPDATE:  Pt's mother updated 2/17 regarding plan over the phone   CODE STATUS: Full Code  DISPO: remain in HFICU    Patient seen and assessed with Dr. Albert Ta, APRN-CNP
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
## 2024-02-17 NOTE — PROGRESS NOTES
"Vancomycin Dosing by Pharmacy- FOLLOW UP    Charla Bowles is a 33 y.o. year old female who Pharmacy has been consulted for vancomycin dosing for pneumonia. Based on the patient's indication and renal status this patient is being dosed based on a goal trough/random level of 15-20.     Renal function is currently stable.    Current vancomycin dose: 2 g once    Most recent trough level: 14.0 mcg/mL    Visit Vitals  BP 93/51   Pulse (!) 131   Temp 35.7 °C (96.3 °F) (Temporal)   Resp (!) 34        Lab Results   Component Value Date    CREATININE 2.25 (H) 02/16/2024    CREATININE 2.08 (H) 02/16/2024    CREATININE 2.12 (H) 02/16/2024    CREATININE 2.26 (H) 02/15/2024        Patient weight is No results found for: \"PTWEIGHT\"    No results found for: \"CULTURE\"     I/O last 3 completed shifts:  In: 1357 (13.7 mL/kg) [I.V.:557 (5.6 mL/kg); IV Piggyback:800]  Out: 1380 (13.9 mL/kg) [Urine:1375 (0.4 mL/kg/hr); Blood:5]  Weight: 99 kg   [unfilled]    Lab Results   Component Value Date    PATIENTTEMP 37.0 02/16/2024    PATIENTTEMP 37.0 02/16/2024    PATIENTTEMP 37.0 02/16/2024        Assessment/Plan    Below goal random/trough level. Orders placed for new vancomycin regimen of 1750 mg once.   The next level will be obtained on 2/18 at 0330. May be obtained sooner if clinically indicated.   Will continue to monitor renal function daily while on vancomycin and order serum creatinine at least every 48 hours if not already ordered.  Follow for continued vancomycin needs, clinical response, and signs/symptoms of toxicity.       TREE ERNANDEZ, PharmD           "

## 2024-02-17 NOTE — CARE PLAN
Problem: Pain  Goal: Takes deep breaths with improved pain control throughout the shift  Outcome: Progressing  Goal: Turns in bed with improved pain control throughout the shift  Outcome: Progressing  Goal: Walks with improved pain control throughout the shift  Outcome: Progressing  Goal: Performs ADL's with improved pain control throughout shift  Outcome: Progressing  Goal: Participates in PT with improved pain control throughout the shift  Outcome: Progressing  Goal: Free from opioid side effects throughout the shift  Outcome: Progressing  Goal: Free from acute confusion related to pain meds throughout the shift  Outcome: Progressing     Problem: Skin  Goal: Decreased wound size/increased tissue granulation at next dressing change  Outcome: Progressing  Flowsheets (Taken 2/16/2024 0226)  Decreased wound size/increased tissue granulation at next dressing change: Promote sleep for wound healing  Goal: Participates in plan/prevention/treatment measures  Outcome: Progressing  Flowsheets (Taken 2/16/2024 0226)  Participates in plan/prevention/treatment measures:   Elevate heels   Increase activity/out of bed for meals  Goal: Prevent/manage excess moisture  Outcome: Progressing  Flowsheets (Taken 2/16/2024 0226)  Prevent/manage excess moisture: Monitor for/manage infection if present   The patient's goals for the shift include      The clinical goals for the shift include HDS

## 2024-02-17 NOTE — PROCEDURES
"Hemodialysis Catheter    Date/Time: 2/17/2024 6:25 PM    Performed by: DIANA Rg  Authorized by: DIANA Rg  Consent: Written consent obtained.  Risks and benefits: risks, benefits and alternatives were discussed  Consent given by: patient  Patient understanding: patient states understanding of the procedure being performed  Patient consent: the patient's understanding of the procedure matches consent given  Procedure consent: procedure consent matches procedure scheduled  Relevant documents: relevant documents present and verified  Test results: test results available and properly labeled  Patient identity confirmed: verbally with patient, arm band and hospital-assigned identification number  Time out: Immediately prior to procedure a \"time out\" was called to verify the correct patient, procedure, equipment, support staff and site/side marked as required.  Preparation: Patient was prepped and draped in the usual sterile fashion.  Local anesthesia used: yes  Anesthesia: local infiltration    Anesthesia:  Local anesthesia used: yes  Local Anesthetic: lidocaine 2% without epinephrine  Patient tolerance: patient tolerated the procedure well with no immediate complications      A time out was preformed. The patients left neck region was prepped and draped in a sterile fashion using chlorhexidine scrub. Anesthia was achieved with 1% lidocaine. The left internal jugular vein was accessed using a micropuncture needle and sheath. Venous blood was withdrawn and the sheath was advanced into the vein and the needle was withdrawn. A larger guidewire was advanced through the sheath. A small incision was made with a 10 blade scalpel and the sheath was exchanged for a dilator with overlying Armenian #8 catheter over the guidewire until appropriate dilation was obtained. Dilator was flushed appropriately, taking care to first withdraw any free air in system. Dilators were removed, trialysis line inserted via " guideline easily to around 18 cm. Trialysis Ports were both able to draw venous blood very easily.     Guide wire was not able to pull out initially, concern the guide wire might be entangled with RIJ SG on the SVC section.  Pulled RIJ SG out to 30 cm, gentle rotate the guidewire  and was easily slide out the guideline with out resistance. Trialysis was sutured.      Attending physician Dr. Avina aware of the whole process.     We cleaned and dressed the access site. Post procedure chest x-ray was ordered and will be reviewed for correct placement and signs of pneumothorax. Patient tolerated the procedure well.     Lennie Ta CNP  assisted the procedure at the bedside.

## 2024-02-17 NOTE — PROGRESS NOTES
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  Was noted to have markedly reduced ejection fraction on the bedside echocardiogram with an EF of 35% on my estimation.    Fabens-Estevan catheter placed at bedside confirmed elevated filling pressures, low cardiac index, (1.58 on 0.375 mcg/kg/min milrinone) consistent with cardiogenic shock.    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  Since admission patient has been treated with night pride drip/milrinone at 0.375.  Creatinine continues to worsen with nonoliguric HIRAM, liver enzymes have been trending down.    DSA:    Biopsy 2/16/24      Clinical picture is not completely clear. DSAs have been negative, and the biopsy has only immunofluorescent signs of rejection (C3d, C4d positive) with no conclusive histopathological changes consistent with AMR.   However, clinical suspicion is very high given subtherapeutic immunosuppression levels at the time of admission, high risk demographics, presentation with cardiogenic shock in the setting of marked allograft dysfunction and left ventricular hypertrophy.  She has had only a minimal response to 2 doses of IV steroids, and her echocardiogram continues to show a severely reduced ejection fraction with a globally hypokinetic left ventricle.  As such benefits of empiric treatment for AMR including plasmapheresis/IVIG appear to outweigh the risks.  The above strategy was discussed with the patient and she is in agreement.  Further antirejection medication use (antithymocyte globulin) will be guided by clinical response.    Continue milrinone - will reduce the dose to 0.25 " given kidney dysfunction and add dobutamine 2.5.    This critically ill patient continues to be at-risk for clinically significant deterioration / failure due to the above mentioned dysfunctional, unstable organ systems.  I have personally identified and managed all complex critical care issues to prevent aforementioned clinical deterioration.  Critical care time is spent at bedside and/or the immediate area and has included, but is not limited to, the review of diagnostic tests, labs, radiographs, serial assessments of hemodynamics, respiratory status, ventilatory management, and family updates.  Time spent in procedures and teaching are reported separately.    Critical care time: 120 minutes    Fabrice Price MD  Advanced Heart Failure/Transplant Cardiology  Cardio-Oncology  [facility]

## 2024-02-18 NOTE — POST-PROCEDURE NOTE
This is a 33 y.o. 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     I saw and evaluated the patient during the TPE.  The patient was resting in bed.  The vascular access functioned well.     Pre-procedure labs:  WBC   Date/Time Value Ref Range Status   02/17/2024 05:53 AM 7.2 4.4 - 11.3 x10*3/uL Final     Hemoglobin   Date/Time Value Ref Range Status   02/17/2024 05:53 AM 9.5 (L) 12.0 - 16.0 g/dL Final     Hematocrit   Date/Time Value Ref Range Status   02/17/2024 05:53 AM 27.4 (L) 36.0 - 46.0 % Final     Platelets   Date/Time Value Ref Range Status   02/17/2024 05:53  150 - 450 x10*3/uL Final        POCT Calcium, Ionized   Date/Time Value Ref Range Status   02/17/2024 06:07 PM 1.16 1.1 - 1.33 mmol/L Final     Comment:     The performance characteristics of ionized calcium tested  in heparinized plasma or serum have been validated by the  individual  laboratory site where testing is performed.   Testing on heparinized plasma or serum is not approved by   the FDA; however, such approval is not necessary.        Protime   Date/Time Value Ref Range Status   02/17/2024 05:53 AM 15.6 (H) 9.8 - 12.8 seconds Final     INR   Date/Time Value Ref Range Status   02/17/2024 05:53 AM 1.4 (H) 0.9 - 1.1 Final     aPTT   Date/Time Value Ref Range Status   02/17/2024 05:53 AM 25 (L) 27 - 38 seconds Final     Fibrinogen   Date/Time Value Ref Range Status   02/17/2024 05:53  200 - 400 mg/dL Final        Pre-procedure vital signs at 2010:  T: 36.8 C, HR: 134, RR: 31, /78  Pulse oximetry: 97% on room air    Post-procedure vital signs at 2330:  T: 36.7 C, HR: 126, RR: 32, BP: 87/56       Outcome: TPE completed.   Adverse Reaction: No    Assessment and Plan:  33 y.o. 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  Draw post-procedure labs  Vascular  access to be managed by clinical team.  Next TPE #2 is schedule for 2/19/2024.

## 2024-02-18 NOTE — PROGRESS NOTES
"Charla Bowles is a 33 y.o. female on day 3 of admission presenting with Cardiogenic shock (CMS/HCC).    Subjective   Pt seen and examined. Pt informed of plan to decrease NPH dose with glucocorticoid transition to PO prednisone.     Pt is eager to be discharged home prior to her son's birthday party. We discussed that her body's response to treatments will determine discharge timeline and that she may follow up for thyroid and glucose concerns in post transplant diabetes clinic with this provider.      I have reviewed histories, allergies and medications have been reviewed and there are no changes       Objective   Review of Systems   Constitutional:  Positive for fatigue.   Gastrointestinal:  Positive for nausea.   All other systems reviewed and are negative.    Physical Exam  Constitutional:       Appearance: She is obese. She is ill-appearing.   HENT:      Head: Normocephalic and atraumatic.      Nose: Nose normal.      Mouth/Throat:      Mouth: Mucous membranes are dry.      Pharynx: Oropharynx is clear.   Eyes:      Extraocular Movements: Extraocular movements intact.      Conjunctiva/sclera: Conjunctivae normal.   Cardiovascular:      Rate and Rhythm: Regular rhythm. Tachycardia present.   Pulmonary:      Effort: Pulmonary effort is normal.      Breath sounds: Normal breath sounds.   Abdominal:      General: Abdomen is flat. Bowel sounds are normal.      Palpations: Abdomen is soft.   Skin:     General: Skin is warm and dry.   Neurological:      General: No focal deficit present.      Mental Status: She is alert and oriented to person, place, and time. Mental status is at baseline.   Psychiatric:         Mood and Affect: Mood normal.         Behavior: Behavior normal.         Thought Content: Thought content normal.         Last Recorded Vitals  Blood pressure 87/56, pulse (!) 133, temperature 36.1 °C (97 °F), temperature source Temporal, resp. rate 18, height 1.549 m (5' 1\"), weight 99.5 kg (219 lb " 5.7 oz), SpO2 95 %.  Intake/Output last 3 Shifts:  I/O last 3 completed shifts:  In: 9028 (90.7 mL/kg) [P.O.:1800; I.V.:658 (6.6 mL/kg); Other:3804; IV Piggyback:2766]  Out: 5273 (53 mL/kg) [Urine:801 (0.2 mL/kg/hr); Other:3772; Stool:700]  Weight: 99.5 kg     Relevant Results  Results from last 7 days   Lab Units 02/18/24  1213 02/18/24  1209 02/18/24  0634 02/18/24  0114 02/18/24  0035 02/17/24 2008 02/17/24  1813 02/17/24  1807 02/17/24  1159 02/17/24  1153 02/17/24  0754 02/17/24  0553   POCT GLUCOSE mg/dL 224*  --  226* 222*  --  235* 281*  --    < >  --    < >  --    GLUCOSE mg/dL  --  233*  --   --  217*  --   --  241*  --  268*  --  300*    < > = values in this interval not displayed.     Scheduled medications  aspirin, 81 mg, oral, Daily  calcitriol, 0.5 mcg, oral, Daily  calcium carbonate, 1,250 mg, oral, BID with meals  docusate sodium, 100 mg, oral, BID  ferrous sulfate (325 mg ferrous sulfate), 1 tablet, oral, Daily  heparin (porcine), 5,000 Units, subcutaneous, q8h  insulin NPH (Isophane), 10 Units, subcutaneous, Daily  insulin regular, 0-15 Units, subcutaneous, q6h  lactulose, 20 g, oral, Daily  levothyroxine, 200 mcg, oral, Daily before breakfast  magnesium oxide, 800 mg, oral, Daily  multivitamin with minerals, 1 tablet, oral, Daily  pantoprazole, 40 mg, oral, BID  perflutren lipid microspheres, 0.5-10 mL of dilution, intravenous, Once in imaging  perflutren protein A microsphere, 0.5 mL, intravenous, Once in imaging  polyethylene glycol, 17 g, oral, Daily  [START ON 2/23/2024] predniSONE, 10 mg, oral, Once  [START ON 2/19/2024] predniSONE, 50 mg, oral, Daily   Followed by  [START ON 2/20/2024] predniSONE, 40 mg, oral, Daily   Followed by  [START ON 2/21/2024] predniSONE, 30 mg, oral, Daily   Followed by  [START ON 2/22/2024] predniSONE, 20 mg, oral, Daily  rosuvastatin, 40 mg, oral, Nightly  sirolimus, 1 mg, oral, Daily  sulfur hexafluoride microsphr, 2 mL, intravenous, Once in  imaging  tacrolimus, 1.5 mg, oral, Daily  tacrolimus, 1.5 mg, oral, Daily      Continuous medications  DOBUTamine, 5 mcg/kg/min, Last Rate: 5 mcg/kg/min (02/18/24 1200)  milrinone, 0.25 mcg/kg/min, Last Rate: 0.25 mcg/kg/min (02/18/24 1200)  nitroprusside, 0-5 mcg/kg/min, Last Rate: Stopped (02/17/24 1220)  norepinephrine, 0.01-1 mcg/kg/min, Last Rate: Stopped (02/18/24 0030)      PRN medications  PRN medications: acetaminophen, dextrose 10 % in water (D10W), dextrose, diphenhydrAMINE, glucagon, ondansetron, oxyCODONE, traMADol        Assessment/Plan   Principal Problem:    Cardiogenic shock (CMS/HCC)  Active Problems:    Encounter for aftercare following heart transplant (CMS/HCC)    PreDM2 with solumedrol induced hyperglycemia in setting of heart allograft rejection     History Of Present Illness  Brienjackelyn Bowles is a 33 y.o. female presenting with PMHx of stage D HFrEF (PPCM) s/p ICD s/p CardioMEMs, hypothyroidism 2/2 thyroidectomy on replacement therapy, obesity s/p gastric bypass (2017), and SLE who is s/p OHT (3/31/2022) with a post-OHT course complicated by RIJ/RUE DVTs, leukopenia, left groin seroma, worsening renal function, asymptomatic 2R ACR (11/2022) treated with steroids, and thrush who presented to the [facility] ED for nausea and vomiting. Given patient's severely reduced EF patient was started on a milrinone drip and levophed was also started due to hypotension. CXR was obtained which showed an enlarged cardiac silhouette. Pt. Was admitted to the HFICU for cardiogenic shock and concern for acute rejection.    Resumed home medications and pulse dose steroids x3 days as solumedrol 1g q24. PO steroid taper TBD by heart biopsy results. Likely pred 40-60mg starting.      Diabetes history  -A1C historically remains <6.5% in preDM range   - pt did experience steroid induced hyperglycemia at time of her initial OHT in 3/2022  - on no glucose control meds at home        PLAN:    2/15: start solumedrol 1g  pulse q24hr x3 days  2/18: start prednisone 60mg PO  2/19: pred 50mg  2/20: pred 40mg  2/21: pred 30mg  2/22: pred 20mg  2/23: pred 10mg, then stop    - Goal BG <180  - adjust NPH to 10u with each dose of AM prednisone, starting with today's 60mg pred  - adjust ssi to regular insulin corrective scale to #3 q6hr for today, then reduce to #2 tomorrow AM     -Accuchecks (not BMP) TIDAC and QHS- kindly ensure QHS Accucheck is drawn; it is often missed   -Hypoglycemia protocol  -Diabetes Diet- low carb 60 CHO  -Will continue to follow and titrate insulin accordingly     Dispo:  pt may MDI insulin at discharge to address her prednisone taper - exact dose TBD by titration.  SGLT2i tx would benefit both CHF and CKD once tacrolimus levels are returned to maintenance regimen - likely 4-6 months after this allograft rejection episode.    - pt's  Endocrinologist moved and she will need to re-establish endocrine care for hypothyroidism and follow up on steroid induced hyperglycemia, appointment requested for post transplant clinic with Elisabeth Acevedo PA-C in [place] [address] on 4/22/24     I spent 35 minutes in the professional and overall care of this patient.      Elisabeth Acevedo PA-C

## 2024-02-18 NOTE — NURSING NOTE
Apheresis Nursing Note    Charla Bowles is a 33 y.o. female presenting for PLEX for AMR- Cardiac.    Pre-Procedure Assessment  Pre-Procedure Assessment  Level of Consciousness: Responds to voice  Orientation Level:  (Easily arousable. MICU to asess orientation level.)  Cognition: Follows commands, Appropriate safety awareness  Pain Score:  (MICU team to assess and treat pain levels.)  Access Sites 'Okay to Use' Obtained: Yes  Access Site(s) Assessment: Yes  Estimated Replacement Volume: 3200  Vital Signs  Temp: 36.8 °C (98.1 °F)  Heart Rate: 134  Resp: (!) 31  BP: 117/78  Medical Gas Therapy  SpO2: 97 %  Pulse Oximetry Type: Continuous  Oximetry Probe Site Location: Finger  Patient Activity During SpO2 Measurement: At rest  Medical Gas Therapy: None (Room air)  Procedure Information and Time Out  Procedure Type: Therapeutic plasma exchange  Plasma Diagnosis: Antibody-mediated rejection, cardiac  TPE Procedure Number: 1  Volume Processed (L): 1.0  Fluid Balance: 95%  Albumin : Plasma Ratio: 0:100  Kit and Blood/Fluid Warmer Inspection: Tubing inspected with machine prime  TPE Time-Out Completed: Yes  Provider Time Out Completed with: THEO Cervantes  Time Out Completed on: 02/17/24  Time Out Completed at: 2030  Equipment and Disposables  Apheresis Machine: Spectra Optia [id number]  Apheresis Machine PM in Date: Yes  Blood Warmer: Astotherm DNA 2327  Blood Warmer PM in Date: Yes  Kit and Blood/Fluid Warmer Inspection: Tubing inspected prior to connection to patient.  Kit Type: Exchange kits  Kit Lot Number: [id number]  Kit Expiration Date: 10/01/25  ADC-A Used as Anticoagulant: Yes  ACD-A Lot #: [id number]  ACD-A Expiration Date: 09/01/25  9% Sodium Chloride Lot # (Liter): [id number]  9% Sodium Chloride Expiration Date (Liter): 05/31/25  Sodium Chloride Volume: 100 mL  9% Sodium Chloride Lot #: [id number]  9% Sodium Chloride Expiration Date: 02/28/25  Procedure Start  Start Time: 2038    Post-Procedure  Assessment:  Post-Procedure  End Time: 2325  Waste Materials Collected for Research: No  Procedure Outcome: Treatment completed successfully  Adverse Event: No  Post-Procedure Line Assessment: Patent  Post-Procedure Access Care : Loaded/flushed per order, Caps changed, 'Do Not Flush' label applied  Vital Signs  Temp: 36.7 °C (98.1 °F)  Heart Rate: 126  Resp: (!) 32  BP: 87/56  Medical Gas Therapy  SpO2: 97 %  Pulse Oximetry Type: Continuous  Oximetry Probe Site Location: Finger  Patient Activity During SpO2 Measurement: At rest  Medical Gas Therapy: None (Room air)  Post-Procedure Patient Fluid Values   Replacement Fluid Intake (mL): 3297 mL  AC Infused (mL): 153 mL  Rinseback Intake (mL): 66 mL  Ca+ Solution Intake (mL): 288 mL  Other Intake (mL): 0 mL  Apheresis Intake Total (mL): 3804 mL  Removed During Apheresis Output (mL): 4320 mL  AC in Bag Output (mL): 568 mL  Samples Output (mL): 20 mL  Apheresis Output Total (mL): 3772 mL  Inlet Volume Processed (mL): 6339 mL  Net Difference (mL): 32 mL  Disposition  Patient's Condition at End of Procedure: Stable  Disposition: N/A - procedure performed at bedside  Report Given to: Floor Nurse  $ Apheresis Charge: Therapeutic plasma exchange    Ramil Reyes, RN

## 2024-02-18 NOTE — CONSULTS
Vancomycin Dosing by Pharmacy- Cessation of Therapy    Consult to pharmacy for vancomycin dosing has been discontinued by the prescriber, pharmacy will sign off at this time.    Please call pharmacy if there are further questions or re-enter a consult if vancomycin is resumed.     Zenaida Perea, PharmD

## 2024-02-18 NOTE — CARE PLAN
Problem: Pain  Goal: Takes deep breaths with improved pain control throughout the shift  2/18/2024 0151 by Haylie Ruiz RN  Outcome: Progressing  2/18/2024 0150 by Haylie Ruiz RN  Outcome: Progressing  Goal: Turns in bed with improved pain control throughout the shift  2/18/2024 0151 by Haylie Ruiz RN  Outcome: Progressing  2/18/2024 0150 by Haylie Ruiz RN  Outcome: Progressing  Goal: Walks with improved pain control throughout the shift  2/18/2024 0151 by Haylie Ruiz RN  Outcome: Progressing  2/18/2024 0150 by Haylie Ruiz RN  Outcome: Progressing  Goal: Performs ADL's with improved pain control throughout shift  2/18/2024 0151 by Haylie Ruiz RN  Outcome: Progressing  2/18/2024 0150 by Haylie Ruiz RN  Outcome: Progressing  Goal: Participates in PT with improved pain control throughout the shift  2/18/2024 0151 by Haylie Ruiz RN  Outcome: Progressing  2/18/2024 0150 by Haylie Ruiz RN  Outcome: Progressing  Goal: Free from opioid side effects throughout the shift  2/18/2024 0151 by Haylie Ruiz RN  Outcome: Progressing  2/18/2024 0150 by Haylie Ruiz RN  Outcome: Progressing  Goal: Free from acute confusion related to pain meds throughout the shift  2/18/2024 0151 by Haylie Ruiz RN  Outcome: Progressing  2/18/2024 0150 by Haylie Ruiz RN  Outcome: Progressing     Problem: Skin  Goal: Decreased wound size/increased tissue granulation at next dressing change  Outcome: Progressing  Goal: Participates in plan/prevention/treatment measures  2/18/2024 0151 by Haylie Ruiz RN  Outcome: Progressing  2/18/2024 0150 by Haylie Ruiz RN  Outcome: Progressing  Goal: Prevent/manage excess moisture  2/18/2024 0151 by Haylie Ruiz RN  Outcome: Progressing  2/18/2024 0150 by Haylie Ruiz RN  Outcome: Progressing      The clinical goals for the shift include Patient will remain HDS through shift.

## 2024-02-18 NOTE — PROGRESS NOTES
Charla Bowles is a 33 y.o. female on day 3 of admission presenting with Cardiogenic shock (CMS/HCC).    Subjective     Patient hemodynamically stable on Milrinone and Dobutamine gtt, Nipride weaned off yesterday.  She received Plex and IVIG overnight and tolerated it well.  She reports feeling very tiered today and her only c/o pain is in her lower back.  Plan to trend hemodynamics and Lactate and renal function today and initiate oral prednisone taper.      Update 1400:      Hemodynamics stable this afternoon on an increased requirement of dual inotrope.  Limited echo with minimal improvement to EF and little to no change in RV function.  She remains tachycardic in the 130's.  And Lactate increased to 2.2.  The decision was made to place an IABP in the setting of obvious signs of rejection and increased inotropic support.  Thymoglobulin x5-14 to begin immediately after IABP placement    Update 1800: Patient returned from Cath lab with Right fem, 7.5Fr IABP in place at 1:1.  Pt drowsy but answering questions appropriately.  VSS, 's.  Awaiting hemodynamics.  Right groin site without hematoma, or bleeding, lower extremity warm, Circ-motor-neuro assessment to lower extremity intact.  Mother updated at bedside.      Plan:   -Plex/IVIG overnight   -Limited echo- with little to no improvement from previous   - Diurese with lasix 40  - oral Pred taper started today at 60mg today   - No change to Sirolimus or Tacrolimus d/t renal function   - IABP placement- if tachycardia causes problems, switch to 1:2  - wean inotropes as tolerated after IABP placement   - Initiate Thymoglobulin this evening  - daily CD3 level prior to Thymoglobulin dose    - NPH 10units daily x6 days with prednisone taper  -Discontinue Vanc      Objective     Physical Exam  HENT:      Head: Normocephalic and atraumatic.      Mouth/Throat:      Mouth: Mucous membranes are moist.   Eyes:      Pupils: Pupils are equal, round, and reactive to  "light.   Neck:      Comments: Right internal jugular swan- dressing c/d/I  Left internal jugular Trialysis- dressing c/d/i   Cardiovascular:      Rate and Rhythm: Regular rhythm. Tachycardia present.      Heart sounds: Normal heart sounds, S1 normal and S2 normal. Heart sounds not distant. No murmur heard.     No friction rub. No gallop.   Pulmonary:      Effort: Pulmonary effort is normal.      Breath sounds: Normal breath sounds and air entry.   Abdominal:      General: Abdomen is flat.      Palpations: Abdomen is soft.   Musculoskeletal:      Cervical back: Normal range of motion.      Comments: Right 7Fr Femoral IABP   Skin:     General: Skin is warm and dry.      Capillary Refill: Capillary refill takes less than 2 seconds.   Neurological:      General: No focal deficit present.      Mental Status: She is alert.   Psychiatric:         Attention and Perception: Attention normal.         Speech: Speech normal.         Behavior: Behavior normal.         Last Recorded Vitals  Blood pressure 87/56, pulse (!) 133, temperature 36.1 °C (97 °F), temperature source Temporal, resp. rate 24, height 1.549 m (5' 1\"), weight 99.5 kg (219 lb 5.7 oz), SpO2 97 %.  PAP: (16-51)/(5-39) 38/29  CVP:  [13 mmHg-19 mmHg] 17 mmHg  PCWP:  [20 mmHg-22 mmHg] 22 mmHg  CO:  [3.9 L/min-7.5 L/min] 6.5 L/min  CI:  [2 L/min/m2-3.8 L/min/m2] 3.3 L/min/m2     Intake/Output last 3 Shifts:  I/O last 3 completed shifts:  In: 9028 (90.7 mL/kg) [P.O.:1800; I.V.:658 (6.6 mL/kg); Other:3804; IV Piggyback:2766]  Out: 5273 (53 mL/kg) [Urine:801 (0.2 mL/kg/hr); Other:3772; Stool:700]  Weight: 99.5 kg     Relevant Results  Results for orders placed or performed during the hospital encounter of 02/15/24 (from the past 24 hour(s))   Comprehensive metabolic panel   Result Value Ref Range    Glucose 241 (H) 74 - 99 mg/dL    Sodium 131 (L) 136 - 145 mmol/L    Potassium 3.9 3.5 - 5.3 mmol/L    Chloride 96 (L) 98 - 107 mmol/L    Bicarbonate 20 (L) 21 - 32 mmol/L    " Anion Gap 19 10 - 20 mmol/L    Urea Nitrogen 54 (H) 6 - 23 mg/dL    Creatinine 3.01 (H) 0.50 - 1.05 mg/dL    eGFR 21 (L) >60 mL/min/1.73m*2    Calcium 9.3 8.6 - 10.6 mg/dL    Albumin 3.9 3.4 - 5.0 g/dL    Alkaline Phosphatase 90 33 - 110 U/L    Total Protein 6.8 6.4 - 8.2 g/dL     (H) 9 - 39 U/L    Bilirubin, Total 0.9 0.0 - 1.2 mg/dL     (H) 7 - 45 U/L   Lactate   Result Value Ref Range    Lactate 2.1 (H) 0.4 - 2.0 mmol/L   BLOOD GAS MIXED VENOUS FULL PANEL   Result Value Ref Range    POCT pH, Mixed 7.39 7.33 - 7.43 pH    POCT pCO2, Mixed 33 (L) 41 - 51 mm Hg    POCT pO2, Mixed 38 35 - 45 mm Hg    POCT SO2, Mixed 55 45 - 75 %    POCT Oxy Hemoglobin, Mixed 53.9 45.0 - 75.0 %    POCT Hematocrit Calculated, Mixed 29.0 (L) 36.0 - 46.0 %    POCT Sodium, Mixed 128 (L) 136 - 145 mmol/L    POCT Potassium, Mixed 3.9 3.5 - 5.3 mmol/L    POCT Chloride, Mixed 98 98 - 107 mmol/L    POCT Ionized Calcium, Mixed 1.19 1.10 - 1.33 mmol/L    POCT Glucose, Mixed 238 (H) 74 - 99 mg/dL    POCT Lactate, Mixed 2.0 0.4 - 2.0 mmol/L    POCT Base Excess, Mixed -4.3 (L) -2.0 - 3.0 mmol/L    POCT HCO3 Calculated, Mixed 20.0 (L) 22.0 - 26.0 mmol/L    POCT Hemoglobin, Mixed 9.8 (L) 12.0 - 16.0 g/dL    POCT Anion Gap, Mixed 14 10 - 25 mmo/L    Patient Temperature 37.0 degrees Celsius    FiO2 21 %   Calcium, ionized   Result Value Ref Range    POCT Calcium, Ionized 1.16 1.1 - 1.33 mmol/L   POCT GLUCOSE   Result Value Ref Range    POCT Glucose 281 (H) 74 - 99 mg/dL   POCT GLUCOSE   Result Value Ref Range    POCT Glucose 235 (H) 74 - 99 mg/dL   CBC and Auto Differential   Result Value Ref Range    WBC 9.3 4.4 - 11.3 x10*3/uL    nRBC 7.7 (H) 0.0 - 0.0 /100 WBCs    RBC 3.36 (L) 4.00 - 5.20 x10*6/uL    Hemoglobin 9.3 (L) 12.0 - 16.0 g/dL    Hematocrit 27.7 (L) 36.0 - 46.0 %    MCV 82 80 - 100 fL    MCH 27.7 26.0 - 34.0 pg    MCHC 33.6 32.0 - 36.0 g/dL    RDW 15.1 (H) 11.5 - 14.5 %    Platelets 180 150 - 450 x10*3/uL    Neutrophils % 84.5  40.0 - 80.0 %    Immature Granulocytes %, Automated 1.8 (H) 0.0 - 0.9 %    Lymphocytes % 6.2 13.0 - 44.0 %    Monocytes % 7.2 2.0 - 10.0 %    Eosinophils % 0.0 0.0 - 6.0 %    Basophils % 0.3 0.0 - 2.0 %    Neutrophils Absolute 7.85 (H) 1.20 - 7.70 x10*3/uL    Immature Granulocytes Absolute, Automated 0.17 0.00 - 0.70 x10*3/uL    Lymphocytes Absolute 0.58 (L) 1.20 - 4.80 x10*3/uL    Monocytes Absolute 0.67 0.10 - 1.00 x10*3/uL    Eosinophils Absolute 0.00 0.00 - 0.70 x10*3/uL    Basophils Absolute 0.03 0.00 - 0.10 x10*3/uL   Calcium, Ionized   Result Value Ref Range    POCT Calcium, Ionized 0.91 (L) 1.1 - 1.33 mmol/L   BLOOD GAS MIXED VENOUS FULL PANEL   Result Value Ref Range    POCT pH, Mixed 7.42 7.33 - 7.43 pH    POCT pCO2, Mixed 34 (L) 41 - 51 mm Hg    POCT pO2, Mixed 45 35 - 45 mm Hg    POCT SO2, Mixed 70 45 - 75 %    POCT Oxy Hemoglobin, Mixed 69.3 45.0 - 75.0 %    POCT Hematocrit Calculated, Mixed 28.0 (L) 36.0 - 46.0 %    POCT Sodium, Mixed 130 (L) 136 - 145 mmol/L    POCT Potassium, Mixed 3.3 (L) 3.5 - 5.3 mmol/L    POCT Chloride, Mixed 97 (L) 98 - 107 mmol/L    POCT Ionized Calcium, Mixed 1.19 1.10 - 1.33 mmol/L    POCT Glucose, Mixed 224 (H) 74 - 99 mg/dL    POCT Lactate, Mixed 2.2 (H) 0.4 - 2.0 mmol/L    POCT Base Excess, Mixed -2.0 -2.0 - 3.0 mmol/L    POCT HCO3 Calculated, Mixed 22.1 22.0 - 26.0 mmol/L    POCT Hemoglobin, Mixed 9.3 (L) 12.0 - 16.0 g/dL    POCT Anion Gap, Mixed 14 10 - 25 mmo/L    Patient Temperature 37.0 degrees Celsius    FiO2 21 %   Magnesium   Result Value Ref Range    Magnesium 2.91 (H) 1.60 - 2.40 mg/dL   Comprehensive metabolic panel   Result Value Ref Range    Glucose 217 (H) 74 - 99 mg/dL    Sodium 133 (L) 136 - 145 mmol/L    Potassium 3.3 (L) 3.5 - 5.3 mmol/L    Chloride 94 (L) 98 - 107 mmol/L    Bicarbonate 21 21 - 32 mmol/L    Anion Gap 21 (H) 10 - 20 mmol/L    Urea Nitrogen 55 (H) 6 - 23 mg/dL    Creatinine 3.10 (H) 0.50 - 1.05 mg/dL    eGFR 20 (L) >60 mL/min/1.73m*2     Calcium 10.6 8.6 - 10.6 mg/dL    Albumin 3.7 3.4 - 5.0 g/dL    Alkaline Phosphatase 65 33 - 110 U/L    Total Protein 5.9 (L) 6.4 - 8.2 g/dL     (H) 9 - 39 U/L    Bilirubin, Total 0.8 0.0 - 1.2 mg/dL     (H) 7 - 45 U/L   Lactate   Result Value Ref Range    Lactate 2.2 (H) 0.4 - 2.0 mmol/L   Phosphorus   Result Value Ref Range    Phosphorus 4.8 2.5 - 4.9 mg/dL   POCT GLUCOSE   Result Value Ref Range    POCT Glucose 222 (H) 74 - 99 mg/dL   Vancomycin, Trough   Result Value Ref Range    Vancomycin, Trough 26.0 (HH) 5.0 - 20.0 ug/mL   Magnesium   Result Value Ref Range    Magnesium 2.80 (H) 1.60 - 2.40 mg/dL   BLOOD GAS MIXED VENOUS FULL PANEL   Result Value Ref Range    POCT pH, Mixed 7.40 7.33 - 7.43 pH    POCT pCO2, Mixed 37 (L) 41 - 51 mm Hg    POCT pO2, Mixed 33 (L) 35 - 45 mm Hg    POCT SO2, Mixed 48 45 - 75 %    POCT Oxy Hemoglobin, Mixed 46.9 45.0 - 75.0 %    POCT Hematocrit Calculated, Mixed 27.0 (L) 36.0 - 46.0 %    POCT Sodium, Mixed 127 (L) 136 - 145 mmol/L    POCT Potassium, Mixed 3.7 3.5 - 5.3 mmol/L    POCT Chloride, Mixed 95 (L) 98 - 107 mmol/L    POCT Ionized Calcium, Mixed 1.26 1.10 - 1.33 mmol/L    POCT Glucose, Mixed 218 (H) 74 - 99 mg/dL    POCT Lactate, Mixed 1.9 0.4 - 2.0 mmol/L    POCT Base Excess, Mixed -1.7 -2.0 - 3.0 mmol/L    POCT HCO3 Calculated, Mixed 22.9 22.0 - 26.0 mmol/L    POCT Hemoglobin, Mixed 8.9 (L) 12.0 - 16.0 g/dL    POCT Anion Gap, Mixed 13 10 - 25 mmo/L    Patient Temperature 37.0 degrees Celsius    FiO2 21 %   Tacrolimus level   Result Value Ref Range    Tacrolimus  3.7 <=15.0 ng/mL   Sirolimus level   Result Value Ref Range    Sirolimus  3.1 (L) 4.0 - 20.0 ng/mL   POCT GLUCOSE   Result Value Ref Range    POCT Glucose 226 (H) 74 - 99 mg/dL   Comprehensive metabolic panel   Result Value Ref Range    Glucose 233 (H) 74 - 99 mg/dL    Sodium 127 (L) 136 - 145 mmol/L    Potassium 3.6 3.5 - 5.3 mmol/L    Chloride 91 (L) 98 - 107 mmol/L    Bicarbonate 21 21 - 32 mmol/L     Anion Gap 19 10 - 20 mmol/L    Urea Nitrogen 65 (H) 6 - 23 mg/dL    Creatinine 3.85 (H) 0.50 - 1.05 mg/dL    eGFR 15 (L) >60 mL/min/1.73m*2    Calcium 9.3 8.6 - 10.6 mg/dL    Albumin 3.3 (L) 3.4 - 5.0 g/dL    Alkaline Phosphatase 53 33 - 110 U/L    Total Protein 7.6 6.4 - 8.2 g/dL     (H) 9 - 39 U/L    Bilirubin, Total 0.8 0.0 - 1.2 mg/dL     (H) 7 - 45 U/L   Lactate   Result Value Ref Range    Lactate 2.2 (H) 0.4 - 2.0 mmol/L   BLOOD GAS MIXED VENOUS FULL PANEL   Result Value Ref Range    POCT pH, Mixed 7.41 7.33 - 7.43 pH    POCT pCO2, Mixed 34 (L) 41 - 51 mm Hg    POCT pO2, Mixed 40 35 - 45 mm Hg    POCT SO2, Mixed 64 45 - 75 %    POCT Oxy Hemoglobin, Mixed 62.8 45.0 - 75.0 %    POCT Hematocrit Calculated, Mixed 26.0 (L) 36.0 - 46.0 %    POCT Sodium, Mixed 126 (L) 136 - 145 mmol/L    POCT Potassium, Mixed 3.7 3.5 - 5.3 mmol/L    POCT Chloride, Mixed 96 (L) 98 - 107 mmol/L    POCT Ionized Calcium, Mixed 1.23 1.10 - 1.33 mmol/L    POCT Glucose, Mixed 237 (H) 74 - 99 mg/dL    POCT Lactate, Mixed 2.3 (H) 0.4 - 2.0 mmol/L    POCT Base Excess, Mixed -2.6 (L) -2.0 - 3.0 mmol/L    POCT HCO3 Calculated, Mixed 21.6 (L) 22.0 - 26.0 mmol/L    POCT Hemoglobin, Mixed 8.6 (L) 12.0 - 16.0 g/dL    POCT Anion Gap, Mixed 12 10 - 25 mmo/L    Patient Temperature 37.0 degrees Celsius    FiO2 0 %   POCT GLUCOSE   Result Value Ref Range    POCT Glucose 224 (H) 74 - 99 mg/dL          ASSESSMENT AND PLAN:   33-year-old female with a medical history of heart transplant March 2022, with a postoperative course complicated by upper extremity/IJ DVTs, 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  Was noted to have markedly reduced ejection fraction on the bedside echocardiogram with an EF of ~35%.  [place] catheter was placed at bedside  and confirmed elevated filling pressures and low cardiac index, (1.58 on 0.375 mcg/kg/min milrinone) concerning for cardiogenic shock.  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  EMBx on 2/16 revealed an mild ACR with +C4D's and negative HLS, however patient continued to show signs of multisystem organ failure with elevated transaminases and worsening renal function and borderline hemodynamics despite 0.375mcg/kg/min Milrinone and Nipride gtts.  Decision was made to place a trialysis catheter and begin treatment for AMR with Plex and IVIG.        Neuro:  # Anxiety. Depression, Acute pain   - Serial neuro and pain assessments   - PO Tylenol PRN for pain  - PT/OT Consult, OOB to chair  - CAM ICU score every shift  - Sleep/wake cycle normalization     # Physical Status  -BMI 41. Obese     #Substance abuse  -Denies EtOH, tobacco and illicit drug use.     Cardiovascular:  # HFrEF 2/2 NICM s/p OHT (3/31/2022)  # Cardiogenic shock  # mild ACR with +C4D and negative HLA's, however clinical presentation c/f AMR rejection.     - TTE 11/29: LVEF 60-65%, Abnormal septal motion consistent with post-operative status, There is reduced right ventricular systolic function, RVSP within normal limits.  - admit weight 99kg  - admit BNP >5000  - Lactate on admit: 2.4 continue to trend   -opening swan numbers: 103/76 (84), CVP 13, PAP 32/24 (27), CO/CI 4.2/2.14, SVR 1352, SVO2 56% on milrinone 0.375mcgkg/min  and Nipride 1mcg/kg.min    -daily swan numbers (2/18): 92/61 (73), CVP 19, PAP 32/20(26), W 22, CO/CI 3.9/1.99, SVR 1107, SVO2 48% on milrinone 0.25mcg/kg/min, Dobutamine 2.5mcg/kg/min   - Dobutamine increased to 5mcg/kg/min   -40mg IV Lasix given 2/18  - Nipride weaned off 2/17  - Prednisone taper over a 6 day period- 60mg, 50 mg, 40 mg, 30 mg, 20 mg, 10 mg  - Plasmapheresis x 5 sessions every other day  beginning 2/17 plus IVIG (pre treat with po tylenol and  benadryl)  - IVIG 100 mg/kg x 4 doses (to be administered AFTER plasmapheresis session 1-4) and one dose of IVIG 1.6 g/kg after the 5th and final plasmapheresis session   - IABP placed 2/18   - Thyroglobulin 1.5mg/kg x5-14 days started 2/18 d/t aggressive clinical picture (send daily CD3 levels prior to ordering thymo)  -Transfusoin medicine consulted and following   - Daily standing weights, 2gm sodium diet, 2L fluid restriction, strict I&Os     #S/p heart transplant 3/31/2022  -indication NICM HFrEF PPCM s/p cardioMEMs/ICD  -induction basiliximab  -donor HCV -, toxo -/-, CMV -/+     Rejection history and DSAs: pAMR1 (7/20/22) with preserved graft function and normal resting hemodynamics. Immunosuppression escalated, negative DSA. Repeat EMBx (8/10/22) without rejection. MMF induced colitis so transitioned to AZA. Then found to have asymptomatic 2R ACR (11/9/22) with stable hemodynamics and graft function; treated with IV steroids, optimization of tacrolimus dosing and increase of azathioprine. AZA then switched to sirolimus in light of worsening renal function.   DSA: 9/15/22: Negative  DSA: 12/14/22: Negative  DSA: 2/16/24: Negative      #Immunosuppression  MMF stopped due to colitis  AZA stopped due to worsening renal function and rejection episode  -home Sirolimus 0.5mg PO daily and Tacrolimus 1.5mg PO BID.   -Goal FK 3-5, Goal Sirolimus 7-9  -3 day course of IV pulse dose steroids complete (2/15-2/18)  -target sum total sirolimus and tacrolimus (10-12)   - Tac level (2/18): 3.7  - Sirolimus level (2/18): 3.1  - c/w Sirolimus 1mg PO daily and Tac 1.5mg PO BID--> No changes to dosing d/t worsening renal function per Dr. Price.    -Normally takes prednisone 5 mg daily (pred for life given rejection episodes both ACR & AMR)   -monitor DSAs closely  -Plex and IVIG started 2/17  -Thymoglobulin therapy 2/18      #Prophylaxis  -ASA 81mg daily  -c/w home Rosuvastatin 40mg PO daily and vascepa 1g BID  -Ca/D, PPI        #HTN  - C/w home amlodipine 2.5mg daily     #Electrolyte Disturbances  -Maintain K+ >4.     #hypomagnesemia  -Continue with daily magnesium 800mg   -trend magnesium daily. Maintain magnesium >2     Pulmonary:   -No Hx of pulmonary disease/Pulmonary Hypertension  -Monitor and maintain SpO2 > 92%        GI:  #PMH gastric bypass and MMF colitis  - C/w Senna S for prophylaxis  - Miralax prn  - c/w home PPI     :  #PMH of persistent HIRAM-d/t medication use. Significant HIRAM today  -Baseline BUN/Cr 1.1-1.2  -Admit BUN/Cr 41/2.12  -Renal artery US: Renal Artery Duplex: The aorta is not visualized distal. Right Renal Artery: Right renal arteries demonstrate no evidence of hemodynamically significant stenosis. The right renal artery demonstrates a high resistive flow pattern. Left Renal Artery: Technically dificult. Left renal artery difficult to visualize. Low high resistive flow noted. The distal renal artery is not visualized. Difficult to visualize parenchyma flow, however appears to be low high resistive flow.  -Urine lytes pending  -I/Os  -avoid hypotension and nephrotoxic agents     Heme:  #Iron deficiency anemia  - Labs: CBC 11.0/31.8, TIBC 326, ferritin 308, serum Fe 42, folate 23.9, B12 1,179, % sat 13     - C/w PO iron  -Defer venofer due to septic shock vs. Cardiogenic shock.      # h/o Leukopenia and neutropenia which required Neupogen  WBC 8.0 on 2/16  -daily CBC     # PMH of RIJ DVTs, persistent superficial RUE basilic vein thrombus.   -Anticoagulation stopped (4/27/22).   -prophylactic anticoagulation with heparin SQ        Endo:  #Hyperglycemia in the setting on Pulse dose steroids  - hgbA1c 6.3  - Endo consulted and following  - NPH 10 units daily to be given with Prednisone taper   - increased to Regular SSC #3 q6hrs  - hypoglycemia protocol as necessary       #Hypothyroidism s/p thyroidectomy  -TSH 12.02, Free T4 2.47  -c/w home synthroid 200mcg daily  -Endocrine consulted and following        ID:  #Sepsis vs. Cardiogenic shock.    -no s/s infx. Afebrile. No leukocytosis on admit.   -trend temps q4h  -Given a dose of zosyn and vancomycin in the ED.   -Blood Cx pending  - c/w zosyn for now   -Vanc discontinued in the setting of HIRAM        PHYSICAL AND OCCUPATIONAL THERAPY: Ordered     LINES:  PIVs   A line R radial (placed 2/16)  Central/Latex Free RIJ [place] (Placed 2/16)  Left internal jugular Trialysis line (placed 2/17)        DVT: SCDs  VAP BUNDLE: N/a  ULCER PPX: PPI  GLYCEMIC CONTROL: SSI  BOWEL CARE: Colace and miralax PRN  INDWELLING CATHETER: n/a  NUTRITION: Adult diet Regular        EMERGENCY CONTACT: Extended Emergency Contact Information  Primary Emergency Contact: Fani Geller  Home Phone: [phone]  Relation: Parent  FAMILY UPDATE:  Pt's mother updated bedside 2/18.    CODE STATUS: Full Code  DISPO: remain in HFICU    Patient seen and assessed with Dr. Albert Ta, APRN-CNP

## 2024-02-18 NOTE — POST-PROCEDURE NOTE
Physician Transition of Care Summary  Invasive Cardiovascular Lab    Procedure Date: 2/18/2024  Attending:    * Geovanna Kitchen - Primary  Resident/Fellow/Other Assistant: Surgeon(s) and Role:     * Carl B Gillombardo, MD - Fellow    Indications:   Pre-op Diagnosis     * Cardiogenic shock (CMS/HCC) [R57.0]     * Encounter for aftercare following heart transplant (CMS/HCC) [Z48.21]    Post-procedure diagnosis:   Post-op Diagnosis     * Cardiogenic shock (CMS/HCC) [R57.0]     * Encounter for aftercare following heart transplant (CMS/HCC) [Z48.21]    Procedure(s):   IABP Insertion    Left Heart Cath  08172 - MN L HRT CATH W/NJX L VENTRICULOGRAPHY IMG S&I        Procedure Findings:   Right dominant coronary circulation with no significant coronary artery disease  LVEDP 20  Successful intra-aortic balloon pump placed via right common femoral artery sutured in place in the usual fashion.    Description of the Procedure:   Ultrasound and fluoroscopy guided 8.5 [place] right common femoral artery access  Successful, uncomplicated, and well-tolerated by the patient    Complications:   none    Stents/Implants:   Cardiovascular Implants       Pacemaker    Lead, Pacing, Cardiac, Temporary, W/Suture, Flexon, 2-0, 24 Case [id number] - Implanted        Model/Cat number: [id number] : [employer] - [employer]    Lot number: [id number] Implant Date: 3/31/2022    Description: Converted from  [facility]. Please see archived information for full log information.        As of 9/1/2023       Status: Unknown                      Lead, Pacing, Myocardial, Bipolar, Temporary Case [id number] - Implanted        Model/Cat number: [id number] : [employer]    Implant Date: 3/31/2022 Description: Converted from [facility]. Please see archived information for full log information.      As of 9/1/2023       Status: Unknown                      Other Cardiac Implant    Delivery System, Cardiomems Pa Sensor, Includes [id number],  [id number]/22/2020 - Implanted        Inventory item: DELIVERY SYSTEM, CARDIOMEMS PA SENSOR, INCLUDES [id number], [id number] Model/Cat number:  PATIENT SYSTEM    Serial number: [id number] : [employer]    Lot number: [id number] / [id number]        As of 11/13/2023       Status: Unknown                As of 5/22/2020       Status: Implanted                              Anticoagulation/Antiplatelet Plan:   5000 units heparin    Estimated Blood Loss:   0 mL    Anesthesia: Moderate Sedation Anesthesia Staff: No anesthesia staff entered.    Any Specimen(s) Removed:   No specimens collected during this procedure.    Disposition:   Return to HFICU for continued management   Routine post cath care  Please place leg immobilizer and doppler distal lower extremity pulses at least q6hrs.      Electronically signed by: Carl B Gillombardo, MD, 2/18/2024 5:59 PM

## 2024-02-18 NOTE — PROGRESS NOTES
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  Was noted to have markedly reduced ejection fraction on the bedside echocardiogram with an EF of 35% w/ worsening LVH - on my estimation.    [place]-Estevan catheter placed at bedside confirmed elevated filling pressures, low cardiac index, (1.58 on 0.375 mcg/kg/min milrinone) consistent with cardiogenic shock.  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    DSA:    Biopsy 2/16/24      Clinical picture is not completely clear. DSAs have been negative, and the biopsy has only immunofluorescent signs of rejection (C3d, C4d positive) with no conclusive histopathological changes consistent with AMR.   However, clinical suspicion is very high given subtherapeutic immunosuppression levels at the time of admission, high risk demographics, presentation with cardiogenic shock in the setting of marked allograft dysfunction and left ventricular hypertrophy.  She has had only a minimal response to 3 doses of IV steroids, and her echocardiogram continues to show a severely reduced ejection fraction with a globally hypokinetic left ventricle.  Has been treated with IV Solu-Medrol (3 doses) and a single session of plasmapheresis followed by intravenous immunoglobulins.    Last set of hemodynamics have somewhat improved with up titration of the dobutamine to 5 mcg/kg/min - cardiac index is now 3 - CVP remains elevated at 15-18 with evidence of cardiorenal syndrome.  Liver enzymes have improved    Multidisciplinary discussion with advanced heart failure group/CV surgery yielded the following recommendations:  - left heart " catheterization (limited) to assess for cardiac allograft vasculopathy  - Intra-aortic balloon pump placement for hemodynamic support as we attempt to wean down dobutamine/milrinone.  May have to resort to 1:2 support if adequate augmentation is not obtained given underlying sinus tachycardia  - Pausing further intravenous immunoglobulins, plasmapheresis sessions given intermittent hypotension and lack of convincing evidence of AMR.  Antithymocyte globulin will be administered    This critically ill patient continues to be at-risk for clinically significant deterioration / failure due to the above mentioned dysfunctional, unstable organ systems.  I have personally identified and managed all complex critical care issues to prevent aforementioned clinical deterioration.  Critical care time is spent at bedside and/or the immediate area and has included, but is not limited to, the review of diagnostic tests, labs, radiographs, serial assessments of hemodynamics, respiratory status, ventilatory management, and family updates.  Time spent in procedures and teaching are reported separately.    Critical care time: 120 minutes    Fabrice Price MD  Advanced Heart Failure/Transplant Cardiology  Cardio-Oncology  [facility]

## 2024-02-19 NOTE — PROGRESS NOTES
SOCIAL WORK NOTE   SW met with team for updates. Patient in rejection possible need for impella. PT/OT pending. Social work to follow.  CHARLENE Ocampo, LISW-S ([id number])

## 2024-02-19 NOTE — PROGRESS NOTES
[facility]  HFICU PROGRESS NOTE    Charla Bowles/[id number]    Admit Date: 2/15/2024  Hospital Length of Stay: 4   ICU Length of Stay: 3d 13h   Primary Service: HFICU  Primary HF Cardiologist: Dr Cornell     INTERVAL EVENTS / PERTINENT ROS:     Patient is resting in bed this morning 1:1 on IABP on dual inotrope. She is tachycardic with low augmentation. Transplant pharmacy at bedside during rounds this morning to discuss rejection plan with Dr Wright.     Plan:  Stop IVIG/PLEX - continue prednisone taper   Thymo given overnight  Heparin gtt initiated  Wean dobutamine per hemodynamics -> did not tolerate uptrending lactate  Limited ECHO  Start dialysis   Order nystatin  Palliative consult     MEDICATIONS  Infusions:  DOBUTamine, Last Rate: 2.5 mcg/kg/min (02/19/24 1200)  furosemide, Last Rate: Stopped (02/19/24 0941)  heparin, Last Rate: 1,800 Units/hr (02/19/24 1200)  milrinone, Last Rate: 0.25 mcg/kg/min (02/19/24 1200)  norepinephrine  PrismaSol 4/2.5, Last Rate: 2,400 mL/hr (02/19/24 1054)      Scheduled:  aspirin, 81 mg, Daily  calcitriol, 0.5 mcg, Daily  calcium carbonate, 1,250 mg, BID with meals  ferrous sulfate (325 mg ferrous sulfate), 1 tablet, Daily  heparin, 1,000 Units, Once  heparin, 1,000 Units, Once  immune globulin (human), 10 g, Once  insulin NPH (Isophane), 12 Units, Daily  insulin regular, 0-10 Units, TID with meals  levothyroxine, 200 mcg, Daily before breakfast  magnesium oxide, 800 mg, Daily  multivitamin with minerals, 1 tablet, Daily  nystatin, 4 mL, 4x daily  pantoprazole, 40 mg, BID  perflutren lipid microspheres, 0.5-10 mL of dilution, Once in imaging  perflutren protein A microsphere, 0.5 mL, Once in imaging  [START ON 2/23/2024] predniSONE, 10 mg, Once  [START ON 2/20/2024] predniSONE, 40 mg, Daily   Followed by  [START ON 2/21/2024] predniSONE, 30 mg, Daily   Followed by  [START ON 2/22/2024] predniSONE, 20 mg, Daily  rosuvastatin, 40 mg,  "Nightly  sirolimus, 1 mg, Daily  sulfur hexafluoride microsphr, 2 mL, Once in imaging  tacrolimus, 1.5 mg, Daily  tacrolimus, 1.5 mg, Daily      PRN:  acetaminophen, 650 mg, q6h PRN  dextrose 10 % in water (D10W), 0.3 g/kg/hr, Once PRN  dextrose, 25 g, q15 min PRN  diphenhydrAMINE, 25 mg, q6h PRN  glucagon, 1 mg, q15 min PRN  heparin, 3,000-6,000 Units, q4h PRN  HYDROmorphone, 1 mg, q4h PRN  lactulose, 20 g, Daily PRN  ondansetron, 4 mg, q6h PRN  promethazine, 12.5 mg, q6h PRN      Invasive Hemodynamics:    Most Recent Range Past 24hrs   BP (Art) 91/56 Arterial Line BP 1  Min: 76/51  Max: 109/75   MAP(Art) 72 mmHg Arterial Line MAP 1 (mmHg)   Min: 62 mmHg  Max: 88 mmHg   RA/CVP   No data recorded   PA 28/20 PAP  Min: 24/18  Max: 47/36   PA(mean) 23 mmHg PAP (Mean)  Min: 20 mmHg  Max: 41 mmHg   PCWP 23 mmHg PCWP (mmHg)  Min: 20 mmHg  Max: 23 mmHg   CO 5.7 L/min CO (L/min)  Min: 5.7 L/min  Max: 5.7 L/min   CI 2.9 L/min/m2 CI (L/min/m2)  Min: 2.9 L/min/m2  Max: 2.9 L/min/m2   Mixed Venous 61 % SVO2 (%)  Min: 61 %  Max: 61 %   SVR  729 (dyne*sec)/cm5 SVR (dyne*sec)/cm5  Min: 729 (dyne*sec)/cm5  Max: 814 (dyne*sec)/cm5   PVR 42 (dyne*sec)/cm5 PVR (dyne*sec)/cm5  Min: 42 (dyne*sec)/cm5  Max: 42 (dyne*sec)/cm5       PHYSICAL EXAM:   Visit Vitals  BP 87/56   Pulse (!) 122   Temp 36.1 °C (97 °F) (Temporal)   Resp 20   Ht 1.549 m (5' 1\")   Wt 100 kg (221 lb 5.5 oz)   SpO2 99%   BMI 41.82 kg/m²   OB Status Hysterectomy   Smoking Status Never   BSA 2.07 m²       Wt Readings from Last 5 Encounters:   02/19/24 100 kg (221 lb 5.5 oz)   12/07/23 92.1 kg (203 lb)   12/01/23 93 kg (205 lb)   11/29/23 92.9 kg (204 lb 12.8 oz)   11/09/23 91.3 kg (201 lb 3.2 oz)       INTAKE/OUTPUT:  I/O last 3 completed shifts:  In: 8068.9 (80.4 mL/kg) [P.O.:1920; I.V.:428.4 (4.3 mL/kg); Other:3804; IV Piggyback:1916.5]  Out: 4723 (47 mL/kg) [Urine:251 (0.1 mL/kg/hr); Other:3772; Stool:700]  Weight: 100.4 kg      Physical Exam  Vitals reviewed.   HENT: "      Mouth/Throat:      Mouth: Mucous membranes are moist.   Eyes:      Pupils: Pupils are equal, round, and reactive to light.   Cardiovascular:      Rate and Rhythm: Regular rhythm. Tachycardia present.      Pulses: Normal pulses.   Pulmonary:      Effort: Pulmonary effort is normal. No respiratory distress.      Breath sounds: Normal breath sounds.   Abdominal:      General: Bowel sounds are normal. There is distension.      Palpations: Abdomen is soft.      Tenderness: There is no abdominal tenderness.   Musculoskeletal:      Right lower leg: Edema present.      Left lower leg: Edema present.   Skin:     General: Skin is dry.      Capillary Refill: Capillary refill takes less than 2 seconds.   Neurological:      General: No focal deficit present.      Mental Status: She is alert and oriented to person, place, and time.   Psychiatric:         Mood and Affect: Mood normal.         Behavior: Behavior normal.         DATA:  CMP:  Results from last 7 days   Lab Units 02/19/24  0529 02/18/24  2317 02/18/24  1815 02/18/24  1209 02/18/24  0352 02/18/24  0035 02/17/24  1807 02/17/24  1153 02/17/24  0553 02/16/24  1537 02/16/24  0538 02/16/24  0116 02/15/24  2203   SODIUM mmol/L 125* 126* 128* 127*  --  133* 131* 130* 129* 132* 130* 131* 131*   POTASSIUM mmol/L 4.2 3.9 3.6 3.6  --  3.3* 3.9 3.6 3.8 4.1 4.1 4.6 4.7   CHLORIDE mmol/L 91* 91* 93* 91*  --  94* 96* 95* 97* 98 98 100 98   CO2 mmol/L 19* 20* 18* 21  --  21 20* 20* 19* 21 16* 14* 15*   ANION GAP mmol/L 19 19 21* 19  --  21* 19 19 17 17 20 22* 23*   BUN mg/dL 75* 73* 69* 65*  --  55* 54* 52* 49* 45* 42* 41* 40*   CREATININE mg/dL 5.08* 4.70* 4.26* 3.85*  --  3.10* 3.01* 2.74* 2.48* 2.25* 2.08* 2.12* 2.26*   EGFR mL/min/1.73m*2 11* 12* 14* 15*  --  20* 21* 23* 26* 29* 32* 31* 29*   MAGNESIUM mg/dL 2.91*  --   --   --  2.80* 2.91*  --   --  2.84*  --  1.81 1.90 2.01   ALBUMIN g/dL 3.3* 3.5 3.4 3.3*  --  3.7 3.9 3.9 3.4 3.8 3.9 3.8 4.1   ALT U/L 318* 311* 283* 275*   --  279* 636* 682* 625* 771*  --  728* 543*   AST U/L 245* 220* 204* 205*  --  214* 455* 500* 568* 1,082*  --  1,079* 783*   BILIRUBIN TOTAL mg/dL 1.1 1.0 0.9 0.8  --  0.8 0.9 0.8 0.8 0.8  --  1.5* 1.9*     CBC:  Results from last 7 days   Lab Units 02/19/24  0529 02/18/24 2317 02/17/24  2316 02/17/24  0553 02/16/24  0538 02/16/24  0116 02/15/24  2203   WBC AUTO x10*3/uL 8.3 8.0 9.3 7.2 6.5 8.0 7.0   HEMOGLOBIN g/dL 7.7* 7.8* 9.3* 9.5* 11.0* 11.0* 12.6   HEMATOCRIT % 22.3* 22.6* 27.7* 27.4* 31.7* 31.8* 36.9   PLATELETS AUTO x10*3/uL 144* 141* 180 196 205 201 204   MCV fL 78* 79* 82 80 77* 77* 80     COAG:   Results from last 7 days   Lab Units 02/19/24 0529 02/17/24  0553 02/16/24 0116   INR  1.3* 1.4* 1.6*     ABO:   ABO TYPE   Date Value Ref Range Status   02/17/2024 O  Final     HEME/ENDO:  Results from last 7 days   Lab Units 02/16/24  0116 02/15/24  2203   FERRITIN ng/mL 308*  --    IRON SATURATION % 13*  --    TSH mIU/L 12.02* 15.16*   HEMOGLOBIN A1C % 6.3*  --       CARDIAC:   Results from last 7 days   Lab Units 02/16/24  0116 02/15/24  2203   TROPHS ng/L 168* 125*   BNP pg/mL >5,000* >5,000*       ASSESSMENT AND PLAN:   33-year-old female with a medical history of heart transplant March 2022, with a postoperative course complicated by upper extremity/IJ DVTs, 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  Was noted to have markedly reduced ejection fraction on the bedside echocardiogram with an EF of ~35%.  [place]-Estevan catheter was placed at bedside  and confirmed elevated filling pressures and low cardiac index, (1.58 on 0.375 mcg/kg/min milrinone) concerning for cardiogenic shock.  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  EMBx on 2/16 revealed an mild  ACR with +C4D's and negative HLS, however patient continued to show signs of multisystem organ failure with elevated transaminases and worsening renal function and borderline hemodynamics despite 0.375mcg/kg/min Milrinone and Nipride gtts.  Initial decision was made to place a trialysis catheter and begin treatment for AMR with Plex and IVIG on 2/17. IABP was placed on 2/18 due to further decompensated heart failure on dual inotropes. After further review with pathology further PLEX and IVIG will be cancelled due to no evidence of vascular rejection, no DSA and no AMR. Plan will be to continue prednisone taper and initiate CVVHD.      Neuro:  #No acute issues   - Serial neuro and pain assessments   - PO Tylenol PRN for mild pain  - PO Dilaudid 1 mg Q4 hrs for severe pain  - IV zofran PRN first line and IV phenergan PRN second line for nausea   - PT/OT Consult, OOB to chair  - CAM ICU score every shift  - Sleep/wake cycle normalization  - Palliative consult 2/19     # Physical Status  -BMI 41. Obese     #Substance abuse  -Denies EtOH, tobacco and illicit drug use     Cardiovascular:  #HFrEF 2/2 NICM s/p OHT (3/31/2022)  #Cardiogenic shock  #Mild ACR with +C4D and negative HLA's, however clinical presentation c/f AMR rejection  - TTE 11/29: LVEF 60-65%, Abnormal septal motion consistent with post-operative status, There is reduced right ventricular systolic function, RVSP within normal limits.  - Limited ECHO 2/18: shows LVEF 35%, reduced RV function, mild/mod MR and TR  - Repeat limited ordered for 2/19  - admit BNP >5000  - Lactate on admit: 2.4, afternoon lactate 2/19: 2.9 on ABG after attempting to wean dobut -> continue to trend Q6 hours   - Opening swan numbers: 103/76 (84), CVP 13, PAP 32/24 (27), CO/CI 4.2/2.14, SVR 1352, SVO2 56% on milrinone 0.375mcgkg/min  and Nipride 1mcg/kg.min    - Daily swan numbers (2/19): 95/61 (78), CVP 22, PAP 37/28 (33), CO/CI 5.7/2.9, , SVO2 59% on milrinone 0.25mcg/kg/min,  Dobutamine 2.5mcg/kg/min   - C/w dobutamine at 5mcg/kg/min -> attempt to wean at tolerated   - C/w milrinone 0.25 mcg/kg/min  - Was on lasix gtt with minimal output -> now discontinued and CVVH initiated    - IABP placed 2/18 d/t worsening cardiogenic shock, CXR daily and monitor peripheral pulses hourly   - Daily standing weights, 2gm sodium diet, 2L fluid restriction, strict I&Os     #S/p heart transplant 3/31/2022  - Indication NICM HFrEF PPCM s/p cardioMEMs/ICD  - Induction basiliximab  - Donor HCV -, toxo -/-, CMV -/+     Rejection history and DSAs: pAMR1 (7/20/22) with preserved graft function and normal resting hemodynamics. Immunosuppression escalated, negative DSA. Repeat EMBx (8/10/22) without rejection. MMF induced colitis so transitioned to AZA. Then found to have asymptomatic 2R ACR (11/9/22) with stable hemodynamics and graft function; treated with IV steroids, optimization of tacrolimus dosing and increase of azathioprine. AZA then switched to sirolimus in light of worsening renal function.   DSA: 9/15/22: Negative  DSA: 12/14/22: Negative  DSA: 2/16/24: Negative      #Immunosuppression  MMF stopped due to colitis  AZA stopped due to worsening renal function and rejection episode  - Home Sirolimus 0.5mg PO daily and Tacrolimus 1.5mg PO BID.   - Goal FK 3-5, Goal Sirolimus 7-9  - 3 day course of IV pulse dose steroids complete (2/15-2/18)  - Target summary total sirolimus and tacrolimus (10-12)   - Tac level (2/19): 5.9  - Sirolimus level (2/19): 4.8  - C/w Sirolimus 1mg PO daily and Tac 1.5mg PO BID-->levels are coming up so no changes     - Normally takes prednisone 5 mg daily (pred for life given rejection episodes both ACR & AMR)   - Prednisone taper began on 2/18 PO 60 mg and decreasing by 10 every day for 6 days   - Monitor DSAs closely    #Rejection Plan  - Prednisone taper over a 6 day period starting 2/18- 60mg, 50 mg, 40 mg, 30 mg, 20 mg, 10 mg  - Received PLEX/IVIG overnight 2/17  - Received  Thymo on 2/18  - After discussions with pathology plan is to STOP IVIG/PLEX and thymo d/t no evidence of vascular rejection, no DSAs, no AMR as PLEX would not provide value       #Prophylaxis  - ASA 81mg daily  - C/w home Rosuvastatin 40mg PO daily and vascepa 1g BID  - Ca/D, PPI    #HTN  - HOLD home amlodipine 2.5mg daily d/t hypotension and on inotropic therapy      #Electrolyte Disturbances  -Maintain K+ >4.     #Hypomagnesemia  - Continue with daily magnesium 800mg   - Trend magnesium daily. Maintain magnesium >2     Pulmonary:   - No Hx of pulmonary disease/Pulmonary Hypertension  - Remains on RA   - Monitor and maintain SpO2 > 92%     GI:  #PMH gastric bypass and MMF colitis  - C/w Senna S for prophylaxis  - Miralax prn  - c/w home PPI     :  #PMH of persistent HIRAM-d/t medication use. Significant HIRAM today  -Baseline BUN/Cr 1.1-1.2  -Admit BUN/Cr 41/2.12 -> 2/19 75/ 5.08  -Renal artery US: Renal Artery Duplex: The aorta is not visualized distal. Right Renal Artery: Right renal arteries demonstrate no evidence of hemodynamically significant stenosis. The right renal artery demonstrates a high resistive flow pattern. Left Renal Artery: Technically dificult. Left renal artery difficult to visualize. Low high resistive flow noted. The distal renal artery is not visualized. Difficult to visualize parenchyma flow, however appears to be low high resistive flow.  -Urinalysis sent 2/18   -I/Os  -avoid hypotension and nephrotoxic agents     Heme:  #Iron deficiency anemia  - Labs: CBC 11.0/31.8, TIBC 326, ferritin 308, serum Fe 42, folate 23.9, B12 1,179, % sat 13     - C/w PO iron  - Defer venofer due to septic shock vs. cardiogenic shock     #H/o Leukopenia and neutropenia which required Neupogen  - WBC 8.0 on 2/16  - Daily CBC with differential     # PMH of RIJ DVTs, persistent superficial RUE basilic vein thrombus.   - Anticoagulation stopped (4/27/22)   - Was on prophylactic anticoagulation with heparin  subcutaneous, discontinue and started on heparin IV 2/19     Endo:  #Hyperglycemia in the setting on Pulse dose steroids  - hgbA1c 6.3  - Endo consulted and following  - NPH increased to 12 units daily to be given with Prednisone taper of 50 mg today 2/19 (changed daily per Endo)  - Decreased to Regular SSC #2 with meals   - Hypoglycemia protocol as necessary     #Hypothyroidism s/p thyroidectomy  - TSH 12.02, Free T4 2.47  - C/w home synthroid 200mcg daily  - Endocrine consulted and following       ID:  #Sepsis vs. Cardiogenic shock  - No s/s infx. Afebrile. No leukocytosis on admit.   - Trend temps q4h  - Given a dose of zosyn and vancomycin in the ED.   - Blood Cx sent 2/15 and negative after 3 days   - Vanco/Zosyn discontinued (2/15-2/18)     PHYSICAL AND OCCUPATIONAL THERAPY: Ordered     LINES:  PIVs   A line R radial (placed 2/16)  Central/Latex Free RIJ [place] (Placed 2/16)  Left internal jugular Trialysis line (placed 2/17)     DVT: SCDs and heparin gtt  VAP BUNDLE: N/a  ULCER PPX: PPI  GLYCEMIC CONTROL: SSI/NPH  BOWEL CARE: Colace and miralax PRN  INDWELLING CATHETER: n/a  NUTRITION: Adult diet Regular      EMERGENCY CONTACT: Extended Emergency Contact Information  Primary Emergency Contact: SAHIL DIMAS  Address: [address] [place] [address]  Home Phone: [phone]  Mobile Phone: [phone]  Relation: Mother  FAMILY UPDATE: given at bedside 2/19  CODE STATUS: Full Code  DISPO: transfer to CTICU for concern for ECMO    Patient seen and assessed with Dr. Wright    _________________________________________________  JIMMY Musa-CNP
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
## 2024-02-19 NOTE — PROGRESS NOTES
"Charla Bowles is a 33 y.o. female on day 4 of admission presenting with Cardiogenic shock (CMS/HCC).    Subjective   Pt seen and examined. Pt informed of plan to increase NPH dose with steroid. Pt on regular diet and drinking multiple cartons of juice during rounds, likely contributing to hyperglycemia.       I have reviewed histories, allergies and medications have been reviewed and there are no changes       Objective   Review of Systems   Constitutional:  Positive for fatigue.   Gastrointestinal:  Positive for nausea.   All other systems reviewed and are negative.    Physical Exam  Constitutional:       Appearance: She is obese. She is ill-appearing.   HENT:      Head: Normocephalic and atraumatic.      Nose: Nose normal.      Mouth/Throat:      Mouth: Mucous membranes are dry.      Pharynx: Oropharynx is clear.   Eyes:      Extraocular Movements: Extraocular movements intact.      Conjunctiva/sclera: Conjunctivae normal.   Cardiovascular:      Rate and Rhythm: Regular rhythm. Tachycardia present.   Pulmonary:      Effort: Pulmonary effort is normal.      Breath sounds: Normal breath sounds.   Abdominal:      General: Abdomen is flat. Bowel sounds are normal.      Palpations: Abdomen is soft.   Skin:     General: Skin is warm and dry.   Neurological:      General: No focal deficit present.      Mental Status: She is alert and oriented to person, place, and time. Mental status is at baseline.   Psychiatric:         Mood and Affect: Mood normal.         Behavior: Behavior normal.         Thought Content: Thought content normal.         Last Recorded Vitals  Blood pressure 87/56, pulse (!) 121, temperature 36.1 °C (97 °F), temperature source Temporal, resp. rate 14, height 1.549 m (5' 1\"), weight 100 kg (221 lb 5.5 oz), SpO2 100 %.  Intake/Output last 3 Shifts:  I/O last 3 completed shifts:  In: 8068.9 (80.4 mL/kg) [P.O.:1920; I.V.:428.4 (4.3 mL/kg); Other:3804; IV Piggyback:1916.5]  Out: 4723 (47 mL/kg) " [Urine:251 (0.1 mL/kg/hr); Other:3772; Stool:700]  Weight: 100.4 kg     Relevant Results  Results from last 7 days   Lab Units 02/19/24  1142 02/19/24  1057 02/19/24  0813 02/19/24  0550 02/19/24  0529 02/18/24  2317 02/18/24  1817 02/18/24  1815 02/18/24  1213 02/18/24  1209 02/18/24  0114 02/18/24  0035   POCT GLUCOSE mg/dL 308* 305* 196* 179*  --   --  178*  --    < >  --    < >  --    GLUCOSE mg/dL  --   --   --   --  169* 141*  --  157*  --  233*  --  217*    < > = values in this interval not displayed.       Lab Review  Lab Results   Component Value Date    BILITOT 1.1 02/19/2024    CALCIUM 8.5 (L) 02/19/2024    CO2 19 (L) 02/19/2024    CL 91 (L) 02/19/2024    CREATININE 5.60 (H) 02/19/2024    GLUCOSE 169 (H) 02/19/2024    ALKPHOS 65 02/19/2024    K 4.2 02/19/2024    PROT 7.2 02/19/2024     (L) 02/19/2024     (H) 02/19/2024     (H) 02/19/2024    BUN 79 (H) 02/19/2024    ANIONGAP 19 02/19/2024    MG 2.91 (H) 02/19/2024    PHOS 6.6 (H) 02/19/2024     (H) 01/18/2023    ALBUMIN 3.3 (L) 02/19/2024    LIPASE 8 (L) 03/17/2023    GFRF 74 09/27/2023     Lab Results   Component Value Date    TRIG 142 02/16/2024    CHOL 162 02/16/2024    LDLCALC 94 02/16/2024    HDL 39.2 02/16/2024     Lab Results   Component Value Date    HGBA1C 6.3 (H) 02/16/2024    HGBA1C 5.7 (A) 03/17/2023    HGBA1C 6.4 (A) 12/14/2022     The ASCVD Risk score (Toño DK, et al., 2019) failed to calculate for the following reasons:    The 2019 ASCVD risk score is only valid for ages 40 to 79  Scheduled medications  aspirin, 81 mg, oral, Daily  calcitriol, 0.5 mcg, oral, Daily  calcium carbonate, 1,250 mg, oral, BID with meals  ferrous sulfate (325 mg ferrous sulfate), 1 tablet, oral, Daily  heparin, 1,000 Units, intravenous, Once  heparin, 1,000 Units, intravenous, Once  insulin NPH (Isophane), 12 Units, subcutaneous, Daily  insulin regular, 0-10 Units, subcutaneous, TID with meals  levothyroxine, 200 mcg, oral, Daily before  breakfast  magnesium oxide, 800 mg, oral, Daily  multivitamin with minerals, 1 tablet, oral, Daily  nystatin, 4 mL, Swish & Swallow, 4x daily  pantoprazole, 40 mg, oral, BID  perflutren lipid microspheres, 0.5-10 mL of dilution, intravenous, Once in imaging  perflutren protein A microsphere, 0.5 mL, intravenous, Once in imaging  [START ON 2/23/2024] predniSONE, 10 mg, oral, Once  [START ON 2/20/2024] predniSONE, 40 mg, oral, Daily   Followed by  [START ON 2/21/2024] predniSONE, 30 mg, oral, Daily   Followed by  [START ON 2/22/2024] predniSONE, 20 mg, oral, Daily  rosuvastatin, 40 mg, oral, Nightly  sirolimus, 1 mg, oral, Daily  sulfur hexafluoride microsphr, 2 mL, intravenous, Once in imaging  tacrolimus, 1.5 mg, oral, Daily  tacrolimus, 1.5 mg, oral, Daily      Continuous medications  DOBUTamine, 2.5 mcg/kg/min, Last Rate: 2.5 mcg/kg/min (02/19/24 1200)  heparin, 0-4,500 Units/hr, Last Rate: 1,800 Units/hr (02/19/24 1200)  milrinone, 0.25 mcg/kg/min, Last Rate: 0.25 mcg/kg/min (02/19/24 1200)  norepinephrine, 0.01-0.5 mcg/kg/min  PrismaSol 4/2.5, 2,400 mL/hr, Last Rate: 2,400 mL/hr (02/19/24 1054)      PRN medications  PRN medications: acetaminophen, dextrose 10 % in water (D10W), dextrose, diphenhydrAMINE, glucagon, heparin, HYDROmorphone, lactulose, ondansetron, promethazine        Assessment/Plan   Principal Problem:    Cardiogenic shock (CMS/HCC)  Active Problems:    Encounter for aftercare following heart transplant (CMS/HCC)    PreDM2 with solumedrol induced hyperglycemia in setting of heart allograft rejection     History Of Present Illness  Brienjackelyn Bowles is a 33 y.o. female presenting with PMHx of stage D HFrEF (PPCM) s/p ICD s/p CardioMEMs, hypothyroidism 2/2 thyroidectomy on replacement therapy, obesity s/p gastric bypass (2017), and SLE who is s/p OHT (3/31/2022) with a post-OHT course complicated by RIJ/RUE DVTs, leukopenia, left groin seroma, worsening renal function, asymptomatic 2R ACR  (11/2022) treated with steroids, and thrush who presented to the [facility] ED for nausea and vomiting. Given patient's severely reduced EF patient was started on a milrinone drip and levophed was also started due to hypotension. CXR was obtained which showed an enlarged cardiac silhouette. Pt. Was admitted to the HFICU for cardiogenic shock and concern for acute rejection.    Resumed home medications and pulse dose steroids x3 days as solumedrol 1g q24. PO steroid taper TBD by heart biopsy results. Likely pred 40-60mg starting.      Diabetes history  -A1C historically remains <6.5% in preDM range   - pt did experience steroid induced hyperglycemia at time of her initial OHT in 3/2022  - on no glucose control meds at home        PLAN:    2/15: start solumedrol 1g pulse q24hr x3 days  2/18: start prednisone 60mg PO  2/19: pred 50mg  2/20: pred 40mg  2/21: pred 30mg  2/22: pred 20mg  2/23: pred 10mg, then stop    - Goal BG <180  - adjust NPH to 12u with each dose of AM prednisone  - adjust ssi to lispro insulin corrective scale to #2 TIDAC     -Accuchecks (not BMP) TIDAC and QHS- kindly ensure QHS Accucheck is drawn; it is often missed   -Hypoglycemia protocol  -Diabetes Diet- low carb 75 CHO  -Will continue to follow and titrate insulin accordingly     Dispo:  pt may MDI insulin at discharge to address her prednisone taper - exact dose TBD by titration.  SGLT2i tx would benefit both CHF and CKD once tacrolimus levels are returned to maintenance regimen - likely 4-6 months after this allograft rejection episode.    - pt's  Endocrinologist moved and she will need to re-establish endocrine care for hypothyroidism and follow up on steroid induced hyperglycemia, appointment requested for post transplant clinic with Elisabeth Acevedo PA-C in [place] [address] on 4/22/24     I spent 35 minutes in the professional and overall care of this patient.      Sobeida Poe PA-C

## 2024-02-19 NOTE — CARE PLAN
"I spoke to Dr. Alexx Heredia ([facility]) on the phone just now. He told me that from his perspective there is no evidence of AMR. He said there is \"minute levels of complement, almost at level of background noise... low signal.\" Based on this, and lack of injury to blood vessels he concluded there is no AMR (pAMR 0). I asked him whether there could be non-HLA antibodies causing rejection, and said that he would expect to see blood vessel damage in that case-- and he doesn't see any.    He said that sometimes ACR could be patchy and not seen on biopsy, but unlikely that AMR would present as such.    Plan:  -- Stop further PLEX   -- Thymo given 12 hours ago  -- Continue steroid taper    Lexie Wright MD, MPH  Advanced Heart Failure and Transplant Cardiology  [facility] [place]  [facility]    "

## 2024-02-19 NOTE — CARE PLAN
HFICU Attending Note    Yesterday 2/18/2024 she went to catheterization lab. Coronary angiogram reported no evidence of coronary vasculopathy (CAV). She had an IABP placed.     As of this morning 2/19/2024 she is awake and interactive but sluggish. She is supported by IABP 1:1 at HR 125BPM (sinus) with dual inotropes. As the day went on we tried to wean down dose of dobutamine but with that her lactic acid increased. Due to anuria CVVH was initiated.    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 Witt and Dr. Fishman. Patient seen in-person with Dr. Marina. Plan is to transfer patient to CTICU with possible escalation to ECMO.  -- Continue treatment of presumed cellular rejection: solumedrol regimen completed, getting 6 days of PO prednisone (60mg, 50mg, 40mg, 30mg, 20mg, 10mg), continue tacrolimus and sirolimus. Consider a second dose of Thymoglobulin 150mg (pre-treat with methylpred 40mg IV x 1, tylenol 650mg, and benadryl 25mg).    This critically ill patient continues to be at-risk for clinically significant deterioration / failure due to the above mentioned dysfunctional, unstable organ systems.  I have personally identified and managed all complex critical care issues to prevent aforementioned clinical deterioration.  Critical care time is spent at bedside and/or the immediate area and has included, but is not limited to, the review of diagnostic tests, labs, radiographs, serial assessments of hemodynamics, respiratory status, ventilatory management, and family updates.  Time spent in procedures and teaching are reported separately.    Critical care time: __120__ minutes     Lexie Wright MD, MPH  Advanced Heart Failure and Transplant Cardiology  [facility] [place]  [facility]
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
## 2024-02-19 NOTE — PROGRESS NOTES
Occupational Therapy                 Therapy Communication Note    Patient Name: Charla Bowles  MRN: [id number]  Today's Date: 2/19/2024     Discipline: Occupational Therapy    Missed Visit Reason: Missed Visit Reason:  (Patient not medically appropriate at this time, potential plan for ECMO today; will hold and attempt OT next visit as appropriate.)    Missed Time: Attempt    Comment:  July Araujo OTR/L  Inpatient Occupational Therapist   Rehab Office: [phone]

## 2024-02-19 NOTE — PROGRESS NOTES
Physical Therapy                 Therapy Communication Note    Patient Name: Charla Bowles  MRN: [id number]  Today's Date: 2/19/2024     Discipline: Physical Therapy    Missed Visit Reason: Missed Visit Reason:  (Pt on hold for PT at this time as pt not medically stabl- currently with femoral IABP and discussions regarding need for Impella.)    Missed Time: Attempt; 9:00    Comment:
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
## 2024-02-20 PROBLEM — T86.20: Status: ACTIVE | Noted: 2024-01-01

## 2024-02-20 NOTE — PROGRESS NOTES
Subjective Data:  Patient seen this AM, prior to endomyocardial biopsy. Anticipating procedure with mother at the bedside.     Overnight Events:    Transferred to CTICU, VA ECMO cannulation with Dr. Marina     Objective Data:  Last Recorded Vitals:  Vitals:    02/20/24 0700 02/20/24 0800 02/20/24 0900 02/20/24 1000   BP:       Pulse: (!) 122 (!) 122 (!) 122 (!) 121   Resp: 21 21 18 16   Temp: 36 °C (96.8 °F) 35.9 °C (96.6 °F) 36 °C (96.8 °F) 36 °C (96.8 °F)   TempSrc: Core Core Core Core   SpO2: 100% 100% 100% 100%   Weight:       Height:           Last Labs:  CBC - 2/20/2024:  3:09 AM  6.2 7.7 95    22.5      CMP - 2/19/2024: 11:46 PM  7.5 6.2 272 --- 1.0   5.9 2.8 316 57      PTT - 2/19/2024: 11:46 PM  1.4   15.5 62     TROPHS   Date/Time Value Ref Range Status   02/16/2024 01:16  0 - 34 ng/L Final     Comment:     Previous result verified on 2/16/2024 0018 on specimen/case [id number] called with component [facility] for procedure Troponin I, High Sensitivity with value 125 ng/L.   02/15/2024 10:03  0 - 34 ng/L Final   11/10/2022 11:22  0 - 34 ng/L Final     Comment:     .  Less than 99th percentile of normal range cutoff-  Female and children under 18 years old <35 ng/L; Male <54 ng/L: Negative  Repeat testing should be performed if clinically indicated.   .  Female and children under 18 years old  ng/L; Male  ng/L:  Consistent with possible cardiac damage and possible increased clinical   risk. Serial measurements may help to assess extent of myocardial damage.   .  >120 ng/L: Consistent with cardiac damage, increased clinical risk and  myocardial infarction. Serial measurements may help assess extent of   myocardial damage.   .   NOTE: Children less than 1 year old may have higher baseline troponin   levels and results should be interpreted in conjunction with the overall   clinical context.  .  NOTE: Troponin I testing is performed using a different   testing methodology at  [facility] than at other   [facility]. Direct result comparisons should only   be made within the same method.       BNP   Date/Time Value Ref Range Status   02/16/2024 01:16 AM >5,000 0 - 99 pg/mL Final   02/15/2024 10:03 PM >5,000 0 - 99 pg/mL Final     HGBA1C   Date/Time Value Ref Range Status   02/16/2024 01:16 AM 6.3 see below % Final   03/17/2023 08:38 AM 5.7 % Final     Comment:          Diagnosis of Diabetes-Adults   Non-Diabetic: < or = 5.6%   Increased risk for developing diabetes: 5.7-6.4%   Diagnostic of diabetes: > or = 6.5%  .       Monitoring of Diabetes                Age (y)     Therapeutic Goal (%)   Adults:          >18           <7.0   Pediatrics:    13-18           <7.5                   7-12           <8.0                   0- 6            7.5-8.5   American Diabetes Association. Diabetes Care 33(S1), Jan 2010.     12/14/2022 03:41 PM 6.4 % Final     Comment:          Diagnosis of Diabetes-Adults   Non-Diabetic: < or = 5.6%   Increased risk for developing diabetes: 5.7-6.4%   Diagnostic of diabetes: > or = 6.5%  .       Monitoring of Diabetes                Age (y)     Therapeutic Goal (%)   Adults:          >18           <7.0   Pediatrics:    13-18           <7.5                   7-12           <8.0                   0- 6            7.5-8.5   American Diabetes Association. Diabetes Care 33(S1), Jan 2010.       LDLCALC   Date/Time Value Ref Range Status   02/16/2024 01:16 AM 94 <=99 mg/dL Final     Comment:                                 Near   Borderline      AGE      Desirable  Optimal    High     High     Very High     0-19 Y     0 - 109     ---    110-129   >/= 130     ----    20-24 Y     0 - 119     ---    120-159   >/= 160     ----      >24 Y     0 -  99   100-129  130-159   160-189     >/=190       VLDL   Date/Time Value Ref Range Status   02/16/2024 01:16 AM 28 0 - 40 mg/dL Final   07/18/2023 09:24 AM 32 0 - 40 mg/dL Final   03/17/2023 08:38 AM 40 0 - 40 mg/dL  Final   11/10/2022 11:22 PM 44 0 - 40 mg/dL Final      Last I/O:  I/O last 3 completed shifts:  In: 4054 (39.4 mL/kg) [P.O.:1480; I.V.:774 (7.5 mL/kg); Blood:1050; IV Piggyback:750]  Out: 309 (3 mL/kg) [Urine:250 (0.1 mL/kg/hr); Emesis/NG output:15; Other:44]  Weight: 102.9 kg     Inpatient Medications:  Scheduled medications   Medication Dose Route Frequency    acetaminophen  650 mg oral Once    aspirin  81 mg oral Daily    calcitriol  0.5 mcg oral Daily    calcium carbonate  1,250 mg oral BID with meals    calcium gluconate in NS  6,167 mg intravenous Once    calcium gluconate  2 g intravenous Once    diphenhydrAMINE  25 mg oral Once    ferrous sulfate (325 mg ferrous sulfate)  1 tablet oral Daily    heparin  1,000 Units intravenous Once    heparin  1,000 Units intravenous Once    heparin  1,000 Units intravenous Once    heparin  1,000 Units intravenous Once    insulin lispro  0-10 Units subcutaneous q6h    insulin NPH (Isophane)  12 Units subcutaneous Daily    levothyroxine  200 mcg oral Daily before breakfast    multivitamin with minerals  1 tablet oral Daily    nystatin  4 mL Swish & Swallow 4x daily    pantoprazole  40 mg oral BID    perflutren lipid microspheres  0.5-10 mL of dilution intravenous Once in imaging    perflutren protein A microsphere  0.5 mL intravenous Once in imaging    [START ON 2/23/2024] predniSONE  10 mg oral Once    [START ON 2/21/2024] predniSONE  30 mg oral Daily    Followed by    [START ON 2/22/2024] predniSONE  20 mg oral Daily    [Held by provider] rosuvastatin  40 mg oral Nightly    sirolimus  1 mg oral Daily    sulfur hexafluoride microsphr  2 mL intravenous Once in imaging    tacrolimus  1.5 mg oral Daily    tacrolimus  1.5 mg oral Daily     PRN medications   Medication    acetaminophen    calcium carbonate    calcium gluconate    calcium gluconate    dextrose 10 % in water (D10W)    dextrose    diphenhydrAMINE    diphenhydrAMINE    glucagon    heparin    HYDROmorphone     HYDROmorphone    lactulose    magnesium sulfate    magnesium sulfate    potassium chloride CR    Or    potassium chloride    potassium chloride    promethazine    sodium chloride     Continuous Medications   Medication Dose Last Rate    [Held by provider] heparin  0-4,500 Units/hr Stopped (24 1830)    lactated Ringer's  5 mL/hr 5 mL/hr (24 0700)    lactated Ringer's  10 mL/hr 10 mL/hr (24 0700)    milrinone  0.25 mcg/kg/min Stopped (24 1030)    PrismaSol 4/2.5  2,400 mL/hr 2,400 mL/hr (24 1054)     Physical Exam  Constitutional:       Appearance: She is ill-appearing.   Cardiovascular:      Rate and Rhythm: Tachycardia present.      Pulses:           Dorsalis pedis pulses are 1+ on the right side and 1+ on the left side.      Comments: IABP inflate/ deflate auscultated  Musculoskeletal:      Right lower le+ Edema present.      Left lower le+ Edema present.   Skin:     General: Skin is warm.      Comments: Oozing noted left femoral VA access site; right femoral site stable, minimal oozing   Neurological:      Mental Status: She is lethargic.        Assessment/Plan   Charla Bowles is a 33 year old female with past medical history of heart transplant in 2022 with postoperative course complicated by upper extremity/internal jugular DVTs, and asymptomatic 2R rejection in 2022. She was admitted to HFICU on 2/15 with concern for cardiogenic shock secondary to allograft rejection and decompensated heart failure with multiorgan dysfunction including significant elevation of liver enzymes and nonoliguric acute kidney injury. Endomyocardial biopsy on  revealed mild ACR with +CD4s and negative HLAs, however multisystem organ failure persisted with increased inotropic requirements. IABP placed on . Transferred to CTICU on  for VA ECMO cannulation with Dr. Marina for persistent multisystem dysfunction.     : IABP placed in cath lab, [facility] showed right dominant  coronary circulation with no significant coronary artery disease, LVEDP 20 2/19: multi-disciplinary review convened; given persistent requirement for pharmacological and mechanical support, and with the addition of oliguric/anuric renal insufficiency, decision made to transfer to CTICU and initiate VA ECMO    2/20: pending endomyocardial biopsy today for further investigation of allograft dysfunction    Cardiogenic shock 2/2 suspected allograft rejection  S/p OHT 3/2022  - Intra- aortic balloon pump placed on 2/18, VA ECMO cannulation 2/19  - Current IABP settings: 1:1, augmentation 100  - Plan to wean as hemodynamics allow      Recommendations:  - daily CXR with IABP  - please maintain strict bedrest while IABP in place  - closely monitor groin insertion site and distal pulses  - May elevate HOB no greater than 30 degrees  - May log roll patient side to side without flexing involved extremity  - Interventional cardiology will continue to follow, will defer primary care to CICU team     Full Code    I spent 35 minutes in the professional and overall care of this patient.    Ashley Jain, APRN-CNP

## 2024-02-20 NOTE — NURSING NOTE
Apheresis Nursing Note    Charla Bowles is a 33 y.o. female presenting for PLEX for [facility]: Cardiac.    Pre-Procedure Assessment  Pre-Procedure Assessment (Drowsy @ times)  Level of Consciousness: Alert  Orientation Level: Oriented X4  Cognition: Appropriate judgement, Appropriate safety awareness, Follows commands  Pain Score: CTICU team to assess and manage pain levels.  Access Sites 'Okay to Use' Obtained: Yes  Access Site(s) Assessment: Yes  Estimated Replacement Volume: 3300  Vital Signs  Temp: 36.2° C (96.8 °F)  Heart Rate: (!) 119  Resp: (!) 26  BP: 112/69  Medical Gas Therapy  SpO2: 100 %  Pulse Oximetry Type: Continuous  Oximetry Probe Site Location: Finger  Patient Activity During SpO2 Measurement: At rest  Medical Gas Therapy: None (Room air)  Procedure Information and Time Out  Procedure Type: Therapeutic plasma exchange  Plasma Diagnosis: Antibody-mediated rejection, cardiac  TPE Procedure Number: 2  Volume Processed (L): 1.0  Fluid Balance: 95%  Albumin : Plasma Ratio: 0:100  Kit and Blood/Fluid Warmer Inspection: Tubing inspected with machine prime  TPE Time-Out Completed: Yes  Provider Time Out Completed with: KRISHNA Cervantes  Time Out Completed on: 02/20/24  Time Out Completed at: 2030  Equipment and Disposables  Apheresis Machine: Spectra Optia [id number]  Apheresis Machine PM in Date: Yes  Blood Warmer: Astotherm DNA [id number]  Blood Warmer PM in Date: Yes  Kit and Blood/Fluid Warmer Inspection: Tubing inspected prior to connection to patient  Kit Type: Exchange kits  Kit Lot Number: [id number]  Kit Expiration Date: 10/01/25  ADC-A Used as Anticoagulant: Yes  ACD-A Lot #: [id number]  ACD-A Expiration Date: 09/01/25  9% Sodium Chloride Lot # (Liter): [id number]  9% Sodium Chloride Expiration Date (Liter): 05/31/25  Sodium Chloride Volume: 100 mL  9% Sodium Chloride Lot #: [id number]  9% Sodium Chloride Expiration Date: 02/28/25  Procedure Start  Start Time: 1410    Post-Procedure  Assessment:  Post-Procedure  End Time: 1645  Waste Materials Collected for Research: No  Procedure Outcome: Treatment completed successfully  Adverse Event: No  Post-Procedure Line Assessment: Patent  Post-Procedure Access Care : Loaded/flushed per order, Caps changed, 'Do Not Flush' label applied  Vital Signs  Temp: 36.2° C (96.8 °F)  Heart Rate: (!) 127  Resp: (!) 20  BP: 120/70  Medical Gas Therapy  SpO2: 100 %  Pulse Oximetry Type: Continuous  Oximetry Probe Site Location: Finger  Patient Activity During SpO2 Measurement: At rest  Medical Gas Therapy: None (Room air)  Post-Procedure Patient Fluid Values   Replacement Fluid Intake (mL): 3355 mL  AC Infused (mL): 62 mL  Rinseback Intake (mL): 158 mL  Ca+ Solution Intake (mL): 310 mL  Other Intake (mL): 0 mL  Apheresis Intake Total (mL): 3885 mL  Removed During Apheresis Output (mL): 4346 mL  AC in Bag Output (mL): 539 mL  Samples Output (mL): 20 mL  Apheresis Output Total (mL): 3827 mL  Inlet Volume Processed (mL): 6005 mL  Net Difference (mL): 58 mL  Disposition  Patient's Condition at End of Procedure: Stable  Disposition: N/A - procedure performed at bedside  Report Given to: Floor Nurse  $ Apheresis Charge: Therapeutic plasma exchange  Chart Review    Ramil Reyes, RN

## 2024-02-20 NOTE — PROGRESS NOTES
Physical Therapy                 Therapy Communication Note    Patient Name: Charla Bowles  MRN: [id number]  Today's Date: 2/20/2024     Discipline: Physical Therapy    Missed Visit Reason: Missed Visit Reason:  (Pt with L fem VA ECMO and R fem IABP.  Pt on bedrest at this time.  Will hold PT and re-attempt once medically stable/appropriate.)    Missed Time: Attempt

## 2024-02-20 NOTE — PRE-SEDATION DOCUMENTATION
Sedation Plan    ASA 3     Mallampati class: II.    Risks, benefits, and alternatives discussed with patient and mother.

## 2024-02-20 NOTE — PROGRESS NOTES
HFICU Attending Note    She is critically ill and now supported by ECMO and IABP.    Case discussed in detail by [facility] Committee this morning and recommendations given were to do another round of PLEX/IVIG today, and proceed with biopsy.    I performed an endomyocardial biopsy earlier today. Results provided this evening are: ACR 0R, pAMR0 and no C3D or C4D. Basically, no further evidence of rejection.    Plan:  -- Critical care processes and care as determined by CTICU team  -- No further Thymoglobulin or PLEX at this point; continue with oral prednisone taper + routine sirolimus and tacrolimus as she has been getting    This critically ill patient continues to be at-risk for clinically significant deterioration / failure due to the above mentioned dysfunctional, unstable organ systems.  I have personally identified and managed all complex critical care issues to prevent aforementioned clinical deterioration.  Critical care time is spent at bedside and/or the immediate area and has included, but is not limited to, the review of diagnostic tests, labs, radiographs, serial assessments of hemodynamics, respiratory status, ventilatory management, and family updates.  Time spent in procedures and teaching are reported separately.    Critical care time: __120__ minutes     Lexie Wright MD, MPH  Advanced Heart Failure and Transplant Cardiology  [facility]  [facility]

## 2024-02-20 NOTE — PROGRESS NOTES
"Charla Bowles is a 33 y.o. female on day 5 of admission presenting with Cardiogenic shock (CMS/HCC), placed on [facility]ECMO on 2/19/24.    Briefly, the patient is a 33 year old female with past medical history of heart transplant in March 2022 admitted 2/15 with biopsy on 2/16 showing mild ACR.  Sequential escalation of support to IABP (2/18) and [facility]ECMO (2/19).    ECMO NOTE    Patient requires ECMO support. Drainage cannula site, cannula, pump, oxygenator, return cannula and return cannula site clinically inspected. RPM and sweep reviewed and adjusted appropriate to clinical context. Anticoagulation status and intensity discussed. Plan for weaning, next clinical steps discussed with team and family. Discussed with cardiothoracic surgeon, perfusion, palliative care and physical/occupational therapy    ECMO Indication: CS  ECMO Cannula Configuration: fem-fem  ECMO circuit run from drainage cannula to pump to oxygenator to return cannula: y  Presence of cannula bleeding: n  Presence of oxygenator clot: n  Pre/post PaO2 adequate: y  LV Unloading/Pulse Pressure: IABP  Distal Perfusion: intact, perfusion catheter in place  Anticoagulation Plan/Monitoring: off for Bx  Mobility Plan/Candidacy: No, secondary to procedures  Path to decannulation/anticipated decannulation date:Rx for rejection first      [Held by provider] heparin, 0-4,500 Units/hr, Last Rate: Stopped (02/19/24 1830)  lactated Ringer's, 5 mL/hr, Last Rate: 5 mL/hr (02/20/24 0700)  lactated Ringer's, 10 mL/hr, Last Rate: 10 mL/hr (02/20/24 0700)  milrinone, 0.25 mcg/kg/min, Last Rate: Stopped (02/20/24 1030)  PrismaSol 4/2.5, 2,400 mL/hr, Last Rate: 2,400 mL/hr (02/19/24 1054)      Objective     Physical Exam    Last Recorded Vitals  Blood pressure 124/80, pulse (!) 121, temperature 36 °C (96.8 °F), temperature source Core, resp. rate 16, height 1.549 m (5' 1\"), weight 103 kg (226 lb 13.7 oz), SpO2 100 %.  Intake/Output last 3 Shifts:  I/O last 3 completed " shifts:  In: 4054 (39.4 mL/kg) [P.O.:1480; I.V.:774 (7.5 mL/kg); Blood:1050; IV Piggyback:750]  Out: 309 (3 mL/kg) [Urine:250 (0.1 mL/kg/hr); Emesis/NG output:15; Other:44]  Weight: 102.9 kg     Relevant Results  Results for orders placed or performed during the hospital encounter of 02/15/24 (from the past 24 hour(s))   POCT GLUCOSE   Result Value Ref Range    POCT Glucose 308 (H) 74 - 99 mg/dL   Blood Gas Arterial   Result Value Ref Range    POCT pH, Arterial 7.34 (L) 7.38 - 7.42 pH    POCT pCO2, Arterial 30 (L) 38 - 42 mm Hg    POCT pO2, Arterial 108 (H) 85 - 95 mm Hg    POCT SO2, Arterial 99 94 - 100 %    POCT Oxy Hemoglobin, Arterial 97.4 94.0 - 98.0 %    POCT Base Excess, Arterial -8.3 (L) -2.0 - 3.0 mmol/L    POCT HCO3 Calculated, Arterial 16.2 (L) 22.0 - 26.0 mmol/L    Patient Temperature 37.0 degrees Celsius    FiO2 20 %   Calcium, Ionized CRRT   Result Value Ref Range    POCT CRRT, Calcium Ionized 1.05 Reference range not established mmol/L   Blood Urea Nitrogen   Result Value Ref Range    Urea Nitrogen 79 (H) 6 - 23 mg/dL   Creatinine   Result Value Ref Range    Creatinine 5.60 (H) 0.50 - 1.05 mg/dL    eGFR 10 (L) >60 mL/min/1.73m*2   BLOOD GAS MIXED VENOUS FULL PANEL   Result Value Ref Range    POCT pH, Mixed 7.30 (L) 7.33 - 7.43 pH    POCT pCO2, Mixed 38 (L) 41 - 51 mm Hg    POCT pO2, Mixed 37 35 - 45 mm Hg    POCT SO2, Mixed 51 45 - 75 %    POCT Oxy Hemoglobin, Mixed 50.3 45.0 - 75.0 %    POCT Hematocrit Calculated, Mixed 28.0 (L) 36.0 - 46.0 %    POCT Sodium, Mixed 122 (L) 136 - 145 mmol/L    POCT Potassium, Mixed 4.2 3.5 - 5.3 mmol/L    POCT Chloride, Mixed 92 (L) 98 - 107 mmol/L    POCT Ionized Calcium, Mixed 0.97 (L) 1.10 - 1.33 mmol/L    POCT Glucose, Mixed 296 (H) 74 - 99 mg/dL    POCT Lactate, Mixed 2.7 (H) 0.4 - 2.0 mmol/L    POCT Base Excess, Mixed -7.1 (L) -2.0 - 3.0 mmol/L    POCT HCO3 Calculated, Mixed 18.7 (L) 22.0 - 26.0 mmol/L    POCT Hemoglobin, Mixed 9.2 (L) 12.0 - 16.0 g/dL    POCT  Anion Gap, Mixed 16 10 - 25 mmo/L    Patient Temperature 37.0 degrees Celsius    FiO2 20 %   BLOOD GAS MIXED VENOUS FULL PANEL   Result Value Ref Range    POCT pH, Mixed 7.30 (L) 7.33 - 7.43 pH    POCT pCO2, Mixed 37 (L) 41 - 51 mm Hg    POCT pO2, Mixed 40 35 - 45 mm Hg    POCT SO2, Mixed 59 45 - 75 %    POCT Oxy Hemoglobin, Mixed 58.3 45.0 - 75.0 %    POCT Hematocrit Calculated, Mixed 24.0 (L) 36.0 - 46.0 %    POCT Sodium, Mixed 125 (L) 136 - 145 mmol/L    POCT Potassium, Mixed 4.0 3.5 - 5.3 mmol/L    POCT Chloride, Mixed 93 (L) 98 - 107 mmol/L    POCT Ionized Calcium, Mixed 1.11 1.10 - 1.33 mmol/L    POCT Glucose, Mixed 271 (H) 74 - 99 mg/dL    POCT Lactate, Mixed 2.7 (H) 0.4 - 2.0 mmol/L    POCT Base Excess, Mixed -7.6 (L) -2.0 - 3.0 mmol/L    POCT HCO3 Calculated, Mixed 18.2 (L) 22.0 - 26.0 mmol/L    POCT Hemoglobin, Mixed 8.1 (L) 12.0 - 16.0 g/dL    POCT Anion Gap, Mixed 18 10 - 25 mmo/L    Patient Temperature 37.0 degrees Celsius    FiO2 20 %   BLOOD GAS ARTERIAL FULL PANEL   Result Value Ref Range    POCT pH, Arterial 7.36 (L) 7.38 - 7.42 pH    POCT pCO2, Arterial 30 (L) 38 - 42 mm Hg    POCT pO2, Arterial 115 (H) 85 - 95 mm Hg    POCT SO2, Arterial 98 94 - 100 %    POCT Oxy Hemoglobin, Arterial 96.8 94.0 - 98.0 %    POCT Hematocrit Calculated, Arterial 25.0 (L) 36.0 - 46.0 %    POCT Sodium, Arterial 124 (L) 136 - 145 mmol/L    POCT Potassium, Arterial 4.2 3.5 - 5.3 mmol/L    POCT Chloride, Arterial 94 (L) 98 - 107 mmol/L    POCT Ionized Calcium, Arterial 1.10 1.10 - 1.33 mmol/L    POCT Glucose, Arterial 271 (H) 74 - 99 mg/dL    POCT Lactate, Arterial 2.9 (H) 0.4 - 2.0 mmol/L    POCT Base Excess, Arterial -7.7 (L) -2.0 - 3.0 mmol/L    POCT HCO3 Calculated, Arterial 16.9 (L) 22.0 - 26.0 mmol/L    POCT Hemoglobin, Arterial 8.2 (L) 12.0 - 16.0 g/dL    POCT Anion Gap, Arterial 17 10 - 25 mmo/L    Patient Temperature 37.0 degrees Celsius    FiO2 20 %   Lactate   Result Value Ref Range    Lactate 2.6 (H) 0.4 - 2.0  mmol/L   Heparin Assay, UFH   Result Value Ref Range    Heparin Unfractionated 1.0 See Comment Below for Therapeutic Ranges IU/mL   POCT GLUCOSE   Result Value Ref Range    POCT Glucose 226 (H) 74 - 99 mg/dL   Prepare RBC: 4 Units   Result Value Ref Range    PRODUCT CODE E7155G27     Unit Number [id number]     Unit ABO O     Unit RH POS     XM INTEP COMP     Dispense Status TR     Blood Expiration Date March 11, 2024 23:59 EDT     PRODUCT BLOOD TYPE 5100     UNIT VOLUME 350     PRODUCT CODE T8763C38     Unit Number [id number]     Unit ABO O     Unit RH POS     XM INTEP COMP     Dispense Status TR     Blood Expiration Date March 11, 2024 23:59 EDT     PRODUCT BLOOD TYPE 5100     UNIT VOLUME 350     PRODUCT CODE S5792D23     Unit Number [id number]     Unit ABO O     Unit RH POS     XM INTEP COMP     Dispense Status XM     Blood Expiration Date March 12, 2024 23:59 EDT     PRODUCT BLOOD TYPE 5100     UNIT VOLUME 279     PRODUCT CODE E4979Y55     Unit Number [id number]     Unit ABO O     Unit RH POS     XM INTEP COMP     Dispense Status TR     Blood Expiration Date March 12, 2024 23:59 EDT     PRODUCT BLOOD TYPE 5100     UNIT VOLUME 319    Calcium, ionized   Result Value Ref Range    POCT Calcium, Ionized 1.05 (L) 1.1 - 1.33 mmol/L   Renal Function Panel   Result Value Ref Range    Glucose 182 (H) 74 - 99 mg/dL    Sodium 126 (L) 136 - 145 mmol/L    Potassium 4.0 3.5 - 5.3 mmol/L    Chloride 92 (L) 98 - 107 mmol/L    Bicarbonate 17 (L) 21 - 32 mmol/L    Anion Gap 21 (H) 10 - 20 mmol/L    Urea Nitrogen 69 (H) 6 - 23 mg/dL    Creatinine 4.82 (H) 0.50 - 1.05 mg/dL    eGFR 12 (L) >60 mL/min/1.73m*2    Calcium 8.3 (L) 8.6 - 10.6 mg/dL    Phosphorus 6.0 (H) 2.5 - 4.9 mg/dL    Albumin 3.6 3.4 - 5.0 g/dL   Blood Gas Arterial Full Panel Unsolicited   Result Value Ref Range    POCT pH, Arterial 7.41 7.38 - 7.42 pH    POCT pCO2, Arterial 27 (L) 38 - 42 mm Hg    POCT pO2, Arterial 107 (H) 85 - 95 mm Hg    POCT  SO2, Arterial 99 94 - 100 %    POCT Oxy Hemoglobin, Arterial 97.2 94.0 - 98.0 %    POCT Hematocrit Calculated, Arterial 25.0 (L) 36.0 - 46.0 %    POCT Sodium, Arterial 124 (L) 136 - 145 mmol/L    POCT Potassium, Arterial 4.4 3.5 - 5.3 mmol/L    POCT Chloride, Arterial 95 (L) 98 - 107 mmol/L    POCT Ionized Calcium, Arterial 1.09 (L) 1.10 - 1.33 mmol/L    POCT Glucose, Arterial 200 (H) 74 - 99 mg/dL    POCT Lactate, Arterial 2.7 (H) 0.4 - 2.0 mmol/L    POCT Base Excess, Arterial -6.6 (L) -2.0 - 3.0 mmol/L    POCT HCO3 Calculated, Arterial 17.1 (L) 22.0 - 26.0 mmol/L    POCT Hemoglobin, Arterial 8.3 (L) 12.0 - 16.0 g/dL    POCT Anion Gap, Arterial 16 10 - 25 mmo/L    Patient Temperature 37.0 degrees Celsius   Magnesium   Result Value Ref Range    Magnesium 2.91 (H) 1.60 - 2.40 mg/dL   Coagulation Screen   Result Value Ref Range    Protime 15.4 (H) 9.8 - 12.8 seconds    INR 1.4 (H) 0.9 - 1.1    aPTT 135 (HH) 27 - 38 seconds   Fibrinogen   Result Value Ref Range    Fibrinogen 166 (L) 200 - 400 mg/dL   CBC   Result Value Ref Range    WBC 8.3 4.4 - 11.3 x10*3/uL    nRBC 13.4 (H) 0.0 - 0.0 /100 WBCs    RBC 2.97 (L) 4.00 - 5.20 x10*6/uL    Hemoglobin 8.0 (L) 12.0 - 16.0 g/dL    Hematocrit 24.3 (L) 36.0 - 46.0 %    MCV 82 80 - 100 fL    MCH 26.9 26.0 - 34.0 pg    MCHC 32.9 32.0 - 36.0 g/dL    RDW 15.2 (H) 11.5 - 14.5 %    Platelets 131 (L) 150 - 450 x10*3/uL   Renal Function Panel   Result Value Ref Range    Glucose 181 (H) 74 - 99 mg/dL    Sodium 126 (L) 136 - 145 mmol/L    Potassium 4.0 3.5 - 5.3 mmol/L    Chloride 92 (L) 98 - 107 mmol/L    Bicarbonate 17 (L) 21 - 32 mmol/L    Anion Gap 21 (H) 10 - 20 mmol/L    Urea Nitrogen 69 (H) 6 - 23 mg/dL    Creatinine 4.91 (H) 0.50 - 1.05 mg/dL    eGFR 11 (L) >60 mL/min/1.73m*2    Calcium 8.3 (L) 8.6 - 10.6 mg/dL    Phosphorus 5.8 (H) 2.5 - 4.9 mg/dL    Albumin 3.5 3.4 - 5.0 g/dL   Blood Gas Arterial Full Panel Unsolicited   Result Value Ref Range    POCT pH, Arterial 7.40 7.38 -  7.42 pH    POCT pCO2, Arterial 27 (L) 38 - 42 mm Hg    POCT pO2, Arterial 477 (H) 85 - 95 mm Hg    POCT SO2, Arterial 100 94 - 100 %    POCT Oxy Hemoglobin, Arterial 98.3 (H) 94.0 - 98.0 %    POCT Hematocrit Calculated, Arterial 22.0 (L) 36.0 - 46.0 %    POCT Sodium, Arterial 124 (L) 136 - 145 mmol/L    POCT Potassium, Arterial 3.9 3.5 - 5.3 mmol/L    POCT Chloride, Arterial 95 (L) 98 - 107 mmol/L    POCT Ionized Calcium, Arterial 1.00 (L) 1.10 - 1.33 mmol/L    POCT Glucose, Arterial 191 (H) 74 - 99 mg/dL    POCT Lactate, Arterial 2.2 (H) 0.4 - 2.0 mmol/L    POCT Base Excess, Arterial -7.3 (L) -2.0 - 3.0 mmol/L    POCT HCO3 Calculated, Arterial 16.7 (L) 22.0 - 26.0 mmol/L    POCT Hemoglobin, Arterial 7.4 (L) 12.0 - 16.0 g/dL    POCT Anion Gap, Arterial 16 10 - 25 mmo/L    Patient Temperature 37.0 degrees Celsius    FiO2 100 %   Coox Panel, Arterial Unsolicited   Result Value Ref Range    POCT Hemoglobin, Arterial 7.4 (L) 12.0 - 16.0 g/dL    POCT Oxy Hemoglobin, Arterial 98.3 (H) 94.0 - 98.0 %    POCT Carboxyhemoglobin, Arterial 1.0 %    POCT Methemoglobin, Arterial 0.6 0.0 - 1.5 %    POCT Deoxy Hemoglobin, Arterial 0.0 0.0 - 5.0 %   Blood Gas Arterial Full Panel   Result Value Ref Range    POCT pH, Arterial 7.41 7.38 - 7.42 pH    POCT pCO2, Arterial 27 (L) 38 - 42 mm Hg    POCT pO2, Arterial 149 (H) 85 - 95 mm Hg    POCT SO2, Arterial 100 94 - 100 %    POCT Oxy Hemoglobin, Arterial 97.9 94.0 - 98.0 %    POCT Hematocrit Calculated, Arterial 25.0 (L) 36.0 - 46.0 %    POCT Sodium, Arterial 124 (L) 136 - 145 mmol/L    POCT Potassium, Arterial 4.2 3.5 - 5.3 mmol/L    POCT Chloride, Arterial 95 (L) 98 - 107 mmol/L    POCT Ionized Calcium, Arterial 1.01 (L) 1.10 - 1.33 mmol/L    POCT Glucose, Arterial 192 (H) 74 - 99 mg/dL    POCT Lactate, Arterial 1.6 0.4 - 2.0 mmol/L    POCT Base Excess, Arterial -6.6 (L) -2.0 - 3.0 mmol/L    POCT HCO3 Calculated, Arterial 17.1 (L) 22.0 - 26.0 mmol/L    POCT Hemoglobin, Arterial 8.4 (L)  12.0 - 16.0 g/dL    POCT Anion Gap, Arterial 16 10 - 25 mmo/L    Patient Temperature 37.0 degrees Celsius    FiO2 100 %   POCT GLUCOSE   Result Value Ref Range    POCT Glucose 183 (H) 74 - 99 mg/dL   Blood Gas Arterial Full Panel   Result Value Ref Range    POCT pH, Arterial 7.41 7.38 - 7.42 pH    POCT pCO2, Arterial 28 (L) 38 - 42 mm Hg    POCT pO2, Arterial 117 (H) 85 - 95 mm Hg    POCT SO2, Arterial 100 94 - 100 %    POCT Oxy Hemoglobin, Arterial 97.4 94.0 - 98.0 %    POCT Hematocrit Calculated, Arterial 24.0 (L) 36.0 - 46.0 %    POCT Sodium, Arterial 123 (L) 136 - 145 mmol/L    POCT Potassium, Arterial 4.4 3.5 - 5.3 mmol/L    POCT Chloride, Arterial 94 (L) 98 - 107 mmol/L    POCT Ionized Calcium, Arterial 1.04 (L) 1.10 - 1.33 mmol/L    POCT Glucose, Arterial 186 (H) 74 - 99 mg/dL    POCT Lactate, Arterial 1.2 0.4 - 2.0 mmol/L    POCT Base Excess, Arterial -6.1 (L) -2.0 - 3.0 mmol/L    POCT HCO3 Calculated, Arterial 17.7 (L) 22.0 - 26.0 mmol/L    POCT Hemoglobin, Arterial 8.0 (L) 12.0 - 16.0 g/dL    POCT Anion Gap, Arterial 16 10 - 25 mmo/L    Patient Temperature 37.0 degrees Celsius    FiO2 21 %   Magnesium   Result Value Ref Range    Magnesium 2.73 (H) 1.60 - 2.40 mg/dL   Hepatic function panel   Result Value Ref Range    Albumin 2.8 (L) 3.4 - 5.0 g/dL    Bilirubin, Total 1.0 0.0 - 1.2 mg/dL    Bilirubin, Direct 0.6 (H) 0.0 - 0.3 mg/dL    Alkaline Phosphatase 57 33 - 110 U/L     (H) 7 - 45 U/L     (H) 9 - 39 U/L    Total Protein 6.2 (L) 6.4 - 8.2 g/dL   Coagulation Screen   Result Value Ref Range    Protime 15.5 (H) 9.8 - 12.8 seconds    INR 1.4 (H) 0.9 - 1.1    aPTT 62 (H) 27 - 38 seconds   Lactate Dehydrogenase   Result Value Ref Range     (H) 84 - 246 U/L   Phosphorus   Result Value Ref Range    Phosphorus 5.9 (H) 2.5 - 4.9 mg/dL   Basic Metabolic Panel   Result Value Ref Range    Glucose 185 (H) 74 - 99 mg/dL    Sodium 123 (L) 136 - 145 mmol/L    Potassium 4.4 3.5 - 5.3 mmol/L     Chloride 91 (L) 98 - 107 mmol/L    Bicarbonate 18 (L) 21 - 32 mmol/L    Anion Gap 18 10 - 20 mmol/L    Urea Nitrogen 75 (H) 6 - 23 mg/dL    Creatinine 5.26 (H) 0.50 - 1.05 mg/dL    eGFR 10 (L) >60 mL/min/1.73m*2    Calcium 7.5 (L) 8.6 - 10.6 mg/dL   Heparin Assay, UFH   Result Value Ref Range    Heparin Unfractionated 0.8 See Comment Below for Therapeutic Ranges IU/mL   Calcium, Ionized   Result Value Ref Range    POCT Calcium, Ionized 1.01 (L) 1.1 - 1.33 mmol/L   CBC   Result Value Ref Range    WBC 6.6 4.4 - 11.3 x10*3/uL    nRBC 10.5 (H) 0.0 - 0.0 /100 WBCs    RBC 2.53 (L) 4.00 - 5.20 x10*6/uL    Hemoglobin 7.1 (L) 12.0 - 16.0 g/dL    Hematocrit 21.0 (L) 36.0 - 46.0 %    MCV 83 80 - 100 fL    MCH 28.1 26.0 - 34.0 pg    MCHC 33.8 32.0 - 36.0 g/dL    RDW 15.0 (H) 11.5 - 14.5 %    Platelets 101 (L) 150 - 450 x10*3/uL   Prepare RBC: 1 Units   Result Value Ref Range    PRODUCT CODE [id number]     Unit Number [id number]     Unit ABO O     Unit RH POS     XM INTEP COMP     Dispense Status XM     Blood Expiration Date February 28, 2024 23:59 EST     PRODUCT BLOOD TYPE 5100     UNIT VOLUME 333    TEG Clot Lysis Profile   Result Value Ref Range    R (Reaction Time) K >17.0 (H) 4.6 - 9.1 min    MA (Max Amplitude) RT 53.2 52.0 - 70.0 mm    MA ( Fran Amplitude) FF 15.0 15.0 - 32.0 mm   POCT GLUCOSE   Result Value Ref Range    POCT Glucose 173 (H) 74 - 99 mg/dL   CBC   Result Value Ref Range    WBC 6.2 4.4 - 11.3 x10*3/uL    nRBC 11.7 (H) 0.0 - 0.0 /100 WBCs    RBC 2.68 (L) 4.00 - 5.20 x10*6/uL    Hemoglobin 7.7 (L) 12.0 - 16.0 g/dL    Hematocrit 22.5 (L) 36.0 - 46.0 %    MCV 84 80 - 100 fL    MCH 28.7 26.0 - 34.0 pg    MCHC 34.2 32.0 - 36.0 g/dL    RDW 14.9 (H) 11.5 - 14.5 %    Platelets 95 (L) 150 - 450 x10*3/uL   Blood Gas Arterial Full Panel   Result Value Ref Range    POCT pH, Arterial 7.41 7.38 - 7.42 pH    POCT pCO2, Arterial 25 (L) 38 - 42 mm Hg    POCT pO2, Arterial 205 (H) 85 - 95 mm Hg    POCT SO2, Arterial 100 94  - 100 %    POCT Oxy Hemoglobin, Arterial 97.9 94.0 - 98.0 %    POCT Hematocrit Calculated, Arterial 24.0 (L) 36.0 - 46.0 %    POCT Sodium, Arterial 119 (LL) 136 - 145 mmol/L    POCT Potassium, Arterial 4.8 3.5 - 5.3 mmol/L    POCT Chloride, Arterial 92 (L) 98 - 107 mmol/L    POCT Ionized Calcium, Arterial 1.04 (L) 1.10 - 1.33 mmol/L    POCT Glucose, Arterial 197 (H) 74 - 99 mg/dL    POCT Lactate, Arterial 1.1 0.4 - 2.0 mmol/L    POCT Base Excess, Arterial -7.8 (L) -2.0 - 3.0 mmol/L    POCT HCO3 Calculated, Arterial 15.8 (L) 22.0 - 26.0 mmol/L    POCT Hemoglobin, Arterial 8.1 (L) 12.0 - 16.0 g/dL    POCT Anion Gap, Arterial 16 10 - 25 mmo/L    Patient Temperature 37.0 degrees Celsius    FiO2 21 %   Tacrolimus level   Result Value Ref Range    Tacrolimus  7.5 <=15.0 ng/mL   POCT GLUCOSE   Result Value Ref Range    POCT Glucose 208 (H) 74 - 99 mg/dL   Blood Gas Arterial Full Panel   Result Value Ref Range    POCT pH, Arterial 7.36 (L) 7.38 - 7.42 pH    POCT pCO2, Arterial 28 (L) 38 - 42 mm Hg    POCT pO2, Arterial 200 (H) 85 - 95 mm Hg    POCT SO2, Arterial 100 94 - 100 %    POCT Oxy Hemoglobin, Arterial 97.9 94.0 - 98.0 %    POCT Hematocrit Calculated, Arterial 24.0 (L) 36.0 - 46.0 %    POCT Sodium, Arterial 119 (LL) 136 - 145 mmol/L    POCT Potassium, Arterial 5.0 3.5 - 5.3 mmol/L    POCT Chloride, Arterial 92 (L) 98 - 107 mmol/L    POCT Ionized Calcium, Arterial 1.03 (L) 1.10 - 1.33 mmol/L    POCT Glucose, Arterial 195 (H) 74 - 99 mg/dL    POCT Lactate, Arterial 1.0 0.4 - 2.0 mmol/L    POCT Base Excess, Arterial -8.7 (L) -2.0 - 3.0 mmol/L    POCT HCO3 Calculated, Arterial 15.8 (L) 22.0 - 26.0 mmol/L    POCT Hemoglobin, Arterial 8.1 (L) 12.0 - 16.0 g/dL    POCT Anion Gap, Arterial 16 10 - 25 mmo/L    Patient Temperature 37.0 degrees Celsius    FiO2 21 %        Assessment/Plan   Principal Problem:    Cardiogenic shock (CMS/HCC)  Active Problems:    Heart transplant recipient (CMS/HCC)    Encounter for aftercare  following heart transplant (CMS/HCC)    Heart transplant as cause of abnormal reaction or later complication (CMS/HCC)    Continue [facility]ECMO for CS/MOD secondary to rejection cardiac allograft and ongoing treatments.         Quang Mehta MD

## 2024-02-20 NOTE — INTERVAL H&P NOTE
H&P reviewed. The patient was examined and there are no changes to the H&P.    Patient seen this AM, mother at the bedside. Plan for endomyocardial biopsy today with Dr. Wright.
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
## 2024-02-20 NOTE — PROGRESS NOTES
1/1 CTICU    OVERVIEW  03/2022 (OHT) Heart Transplant presented to [facility] ED 2/15 for n/v. 2/16 heart biopsy with rejection. 2/18 IABP. 2/19 VA-ECMO & CVVH --> transferred from HF to CTICU. Presently 2/20 on room air.     DC PLAN  TBD - Care Transitions is following to develop a safe & supportive discharge plan in collaboration with TRANSPLANT & multidisciplinary teams (along with) patient/family/significant others.      PAYOR  [facility]     COMPLETED  (X) Daily ongoing review of patient via chart and/or (M-F) IDT rounds    Claribel Diaz (LSW, MSW)

## 2024-02-20 NOTE — PROGRESS NOTES
Occupational Therapy    Communication Note    Missed Visit: Yes  Missed Visit Reason:  (pt on bedrest with L fem VA ECMO and R fem IABP.  OT evaluation deferred.)      02/20/24 at 9:40 AM   Marcia Thomason, OT   Rehab Office: [phone]

## 2024-02-20 NOTE — PROGRESS NOTES
"Charla Bowles is a 33 y.o. female on day 5 of admission presenting with Cardiogenic shock (CMS/HCC).    Subjective   Pt transferred to [facility]ICU to start ECMO last night. Pt is still getting prednisone qAM, will continue NPH with steroid       I have reviewed histories, allergies and medications have been reviewed and there are no changes       Objective - from 2/19, pt in procedure during rounds  Review of Systems   Constitutional:  Positive for fatigue.   Gastrointestinal:  Positive for nausea.   All other systems reviewed and are negative.    from 2/19, pt in procedure during rounds  Physical Exam  Constitutional:       Appearance: She is obese. She is ill-appearing.   HENT:      Head: Normocephalic and atraumatic.      Nose: Nose normal.      Mouth/Throat:      Mouth: Mucous membranes are dry.      Pharynx: Oropharynx is clear.   Eyes:      Extraocular Movements: Extraocular movements intact.      Conjunctiva/sclera: Conjunctivae normal.   Cardiovascular:      Rate and Rhythm: Regular rhythm. Tachycardia present.   Pulmonary:      Effort: Pulmonary effort is normal.      Breath sounds: Normal breath sounds.   Abdominal:      General: Abdomen is flat. Bowel sounds are normal.      Palpations: Abdomen is soft.   Skin:     General: Skin is warm and dry.   Neurological:      General: No focal deficit present.      Mental Status: She is alert and oriented to person, place, and time. Mental status is at baseline.   Psychiatric:         Mood and Affect: Mood normal.         Behavior: Behavior normal.         Thought Content: Thought content normal.         Last Recorded Vitals  Blood pressure 129/82, pulse (!) 117, temperature 36 °C (96.8 °F), temperature source Core, resp. rate 20, height 1.549 m (5' 1\"), weight 103 kg (226 lb 13.7 oz), SpO2 99 %.  Intake/Output last 3 Shifts:  I/O last 3 completed shifts:  In: 4054 (39.4 mL/kg) [P.O.:1480; I.V.:774 (7.5 mL/kg); Blood:1050; IV Piggyback:750]  Out: 309 (3 mL/kg) " [Urine:250 (0.1 mL/kg/hr); Emesis/NG output:15; Other:44]  Weight: 102.9 kg     Relevant Results  Results from last 7 days   Lab Units 02/20/24  1027 02/20/24  0557 02/20/24  0106 02/19/24  2346 02/19/24  2043 02/19/24  1632 02/19/24  1628 02/19/24  1531 02/19/24  1142 02/19/24  0550 02/19/24  0529   POCT GLUCOSE mg/dL  --  208* 173*  --  183*  --   --  226* 308*   < >  --    GLUCOSE mg/dL 193*  --   --  185*  --  181* 182*  --   --   --  169*    < > = values in this interval not displayed.       Lab Review  Lab Results   Component Value Date    BILITOT 1.0 02/19/2024    CALCIUM 7.5 (L) 02/20/2024    CO2 15 (L) 02/20/2024    CL 90 (L) 02/20/2024    CREATININE 5.78 (H) 02/20/2024    GLUCOSE 193 (H) 02/20/2024    ALKPHOS 57 02/19/2024    K 5.0 02/20/2024    PROT 6.2 (L) 02/19/2024     (LL) 02/20/2024     (H) 02/19/2024     (H) 02/19/2024    BUN 80 (H) 02/20/2024    ANIONGAP 20 02/20/2024    MG 2.85 (H) 02/20/2024    PHOS 7.3 (H) 02/20/2024     (H) 02/19/2024    ALBUMIN 3.0 (L) 02/20/2024    LIPASE 8 (L) 03/17/2023    GFRF 74 09/27/2023     Lab Results   Component Value Date    TRIG 142 02/16/2024    CHOL 162 02/16/2024    LDLCALC 94 02/16/2024    HDL 39.2 02/16/2024     Lab Results   Component Value Date    HGBA1C 6.3 (H) 02/16/2024    HGBA1C 5.7 (A) 03/17/2023    HGBA1C 6.4 (A) 12/14/2022     The ASCVD Risk score (Toño CARSON, et al., 2019) failed to calculate for the following reasons:    The 2019 ASCVD risk score is only valid for ages 40 to 79  Scheduled medications  acetaminophen, 650 mg, oral, Once  aspirin, 81 mg, oral, Daily  calcitriol, 0.5 mcg, oral, Daily  calcium carbonate, 1,250 mg, oral, BID with meals  calcium gluconate in NS, 6,167 mg, intravenous, Once  calcium gluconate, 2 g, intravenous, Once  diphenhydrAMINE, 25 mg, oral, Once  ferrous sulfate (325 mg ferrous sulfate), 1 tablet, oral, Daily  heparin, 1,000 Units, intravenous, Once  heparin, 1,000 Units, intravenous,  Once  heparin, 1,000 Units, intravenous, Once  heparin, 1,000 Units, intravenous, Once  insulin lispro, 0-10 Units, subcutaneous, q6h  insulin NPH (Isophane), 12 Units, subcutaneous, Daily  levothyroxine, 200 mcg, oral, Daily before breakfast  multivitamin with minerals, 1 tablet, oral, Daily  nystatin, 4 mL, Swish & Swallow, 4x daily  pantoprazole, 40 mg, oral, BID  perflutren lipid microspheres, 0.5-10 mL of dilution, intravenous, Once in imaging  perflutren protein A microsphere, 0.5 mL, intravenous, Once in imaging  [START ON 2/23/2024] predniSONE, 10 mg, oral, Once  [START ON 2/21/2024] predniSONE, 30 mg, oral, Daily   Followed by  [START ON 2/22/2024] predniSONE, 20 mg, oral, Daily  [Held by provider] rosuvastatin, 40 mg, oral, Nightly  sirolimus, 1 mg, oral, Daily  sulfur hexafluoride microsphr, 2 mL, intravenous, Once in imaging  tacrolimus, 1.5 mg, oral, Daily  tacrolimus, 1.5 mg, oral, Daily      Continuous medications  [Held by provider] heparin, 0-4,500 Units/hr, Last Rate: Stopped (02/19/24 1830)  lactated Ringer's, 5 mL/hr, Last Rate: 5 mL/hr (02/20/24 0700)  lactated Ringer's, 10 mL/hr, Last Rate: 10 mL/hr (02/20/24 0700)  PrismaSol 4/2.5, 2,400 mL/hr, Last Rate: 2,400 mL/hr (02/19/24 1054)      PRN medications  PRN medications: acetaminophen, calcium carbonate, calcium gluconate, calcium gluconate, dextrose 10 % in water (D10W), dextrose, diphenhydrAMINE, diphenhydrAMINE, fentaNYL PF, glucagon, heparin, HYDROmorphone, HYDROmorphone, lactulose, magnesium sulfate, magnesium sulfate, midazolam, potassium chloride CR **OR** potassium chloride, potassium chloride, promethazine, sodium chloride        Assessment/Plan   Principal Problem:    Cardiogenic shock (CMS/HCC)  Active Problems:    Heart transplant recipient (CMS/[facility])    Encounter for aftercare following heart transplant (CMS/HCC)    Heart transplant as cause of abnormal reaction or later complication (CMS/HCC)    PreDM2 with solumedrol induced  hyperglycemia in setting of heart allograft rejection     History Of Present Illness  Charla REGINALD Bowles is a 33 y.o. female presenting with PMHx of stage D HFrEF (PPCM) s/p ICD s/p CardioMEMs, hypothyroidism 2/2 thyroidectomy on replacement therapy, obesity s/p gastric bypass (2017), and SLE who is s/p OHT (3/31/2022) with a post-OHT course complicated by RIJ/RUE DVTs, leukopenia, left groin seroma, worsening renal function, asymptomatic 2R ACR (11/2022) treated with steroids, and thrush who presented to the [facility] ED for nausea and vomiting. Given patient's severely reduced EF patient was started on a milrinone drip and levophed was also started due to hypotension. CXR was obtained which showed an enlarged cardiac silhouette. Pt. Was admitted to the HFICU for cardiogenic shock and concern for acute rejection.    Resumed home medications and pulse dose steroids x3 days as solumedrol 1g q24. PO steroid taper TBD by heart biopsy results. Likely pred 40-60mg starting.      Diabetes history  -A1C historically remains <6.5% in preDM range   - pt did experience steroid induced hyperglycemia at time of her initial OHT in 3/2022  - on no glucose control meds at home        PLAN:    2/15: start solumedrol 1g pulse q24hr x3 days  2/18: start prednisone 60mg PO  2/19: pred 50mg  2/20: pred 40mg  2/21: pred 30mg  2/22: pred 20mg  2/23: pred 10mg, then stop    - Goal BG <180  - continue NPH to 12u with each dose of AM prednisone  - adjust ssi to lispro insulin corrective scale to #2 q6h (may adjust to ACHS if PO is restarted)  - if glucose >300 mg/dl, please start pt on insulin gtt per [facility] protocol      -Accuchecks q6h  -Hypoglycemia protocol  - pt is NPO  -Will continue to follow and titrate insulin accordingly     Dispo:  pt may MDI insulin at discharge to address her prednisone taper - exact dose TBD by titration.  SGLT2i tx would benefit both CHF and CKD once tacrolimus levels are returned to maintenance regimen -  likely 4-6 months after this allograft rejection episode.    - pt's  Endocrinologist moved and she will need to re-establish endocrine care for hypothyroidism and follow up on steroid induced hyperglycemia, appointment requested for post transplant clinic with Elisabeth Acevedo PA-C in [place] [address] on 4/22/24     I spent 35 minutes in the professional and overall care of this patient.      Sobeida Poe PA-C

## 2024-02-20 NOTE — POST-PROCEDURE NOTE
This is a 33 y.o. 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     I saw and evaluated the patient during the TPE.  The patient was resting in bed.  The vascular access functioned well.     Pre-procedure labs:  WBC   Date/Time Value Ref Range Status   02/20/2024 10:27 AM 7.3 4.4 - 11.3 x10*3/uL Final     Hemoglobin   Date/Time Value Ref Range Status   02/20/2024 10:27 AM 7.5 (L) 12.0 - 16.0 g/dL Final     Hematocrit   Date/Time Value Ref Range Status   02/20/2024 10:27 AM 21.8 (L) 36.0 - 46.0 % Final     Platelets   Date/Time Value Ref Range Status   02/20/2024 10:27 AM 98 (L) 150 - 450 x10*3/uL Final        POCT Calcium, Ionized   Date/Time Value Ref Range Status   02/20/2024 10:27 AM 0.99 (L) 1.1 - 1.33 mmol/L Final     Comment:     The performance characteristics of ionized calcium tested  in heparinized plasma or serum have been validated by the  individual  laboratory site where testing is performed.   Testing on heparinized plasma or serum is not approved by   the FDA; however, such approval is not necessary.        Protime   Date/Time Value Ref Range Status   02/20/2024 10:27 AM 14.1 (H) 9.8 - 12.8 seconds Final     INR   Date/Time Value Ref Range Status   02/20/2024 10:27 AM 1.3 (H) 0.9 - 1.1 Final     aPTT   Date/Time Value Ref Range Status   02/20/2024 10:27 AM 27 27 - 38 seconds Final     Fibrinogen   Date/Time Value Ref Range Status   02/20/2024 10:27  (L) 200 - 400 mg/dL Final        Pre-procedure vital signs at 1400:  T: 36.2 C, HR: 119, RR: 26, /69  Pulse oximetry: 100% on room air    Post-procedure vital signs at 1655:  T: 36.2 C, HR: 127, RR: 20, BP: 120/70     Pulse oximetry: 100% on room air    Outcome: TPE completed.   Adverse Reaction: No    Assessment and Plan:  33 y.o. 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  Draw post-procedure labs  Vascular access to be  managed by clinical team.  Next TPE #3 is schedule for 2/21/24.

## 2024-02-20 NOTE — PROGRESS NOTES
Charla Bowles is a 33 y.o. female on day 5 of admission presenting with Cardiogenic shock (CMS/HCC).    Subjective   Patient transferred from HFICU to CTICU following [facility] ECMO placement. Weaned off dobutamine and received 3xPRBC for anemia.     Objective     Physical Exam  Constitutional:       Appearance: She is ill-appearing and toxic-appearing.   HENT:      Head: Normocephalic.      Mouth/Throat:      Mouth: Mucous membranes are moist.      Pharynx: Oropharynx is clear. No oropharyngeal exudate or posterior oropharyngeal erythema.   Eyes:      Extraocular Movements: Extraocular movements intact.      Conjunctiva/sclera: Conjunctivae normal.      Pupils: Pupils are equal, round, and reactive to light.   Neck:      Vascular: No JVD.   Cardiovascular:      Rate and Rhythm: Regular rhythm. Tachycardia present.      Pulses: Normal pulses.      Heart sounds: Normal heart sounds. No murmur heard.     No friction rub. No gallop.      Comments: Left femoral [facility] ECMO flowing ~4L/ FIO2 100% w/ Sweep 1. Right femoral IABP in place set 1:1 and augmenting appropriately   Pulmonary:      Effort: Pulmonary effort is normal. No accessory muscle usage, respiratory distress or retractions.      Breath sounds: Normal breath sounds.   Abdominal:      General: Abdomen is flat. Bowel sounds are normal. There is no distension.      Palpations: Abdomen is soft. There is no mass.      Tenderness: There is no abdominal tenderness. There is no guarding.      Hernia: No hernia is present.   Musculoskeletal:         General: No swelling or tenderness. Normal range of motion.      Cervical back: Full passive range of motion without pain.   Skin:     General: Skin is warm and dry.      Capillary Refill: Capillary refill takes less than 2 seconds.      Coloration: Skin is not jaundiced.      Findings: No bruising, laceration, lesion, rash or wound.   Neurological:      General: No focal deficit present.      Mental Status: She is alert and  "oriented to person, place, and time. Mental status is at baseline.   Psychiatric:         Mood and Affect: Mood normal.         Last Recorded Vitals  Blood pressure 128/76, pulse (!) 124, temperature 36 °C (96.8 °F), temperature source Core, resp. rate 20, height 1.549 m (5' 1\"), weight 103 kg (226 lb 13.7 oz), SpO2 99 %.  Intake/Output last 3 Shifts:  I/O last 3 completed shifts:  In: 4054 (39.4 mL/kg) [P.O.:1480; I.V.:774 (7.5 mL/kg); Blood:1050; IV Piggyback:750]  Out: 309 (3 mL/kg) [Urine:250 (0.1 mL/kg/hr); Emesis/NG output:15; Other:44]  Weight: 102.9 kg     Relevant Results  Scheduled medications  acetaminophen, 650 mg, oral, Once  aspirin, 81 mg, oral, Daily  calcitriol, 0.5 mcg, oral, Daily  calcium carbonate, 1,250 mg, oral, BID with meals  calcium gluconate in NS, 6,167 mg, intravenous, Once  calcium gluconate, 2 g, intravenous, Once  diphenhydrAMINE, 25 mg, oral, Once  ferrous sulfate (325 mg ferrous sulfate), 1 tablet, oral, Daily  heparin, 1,000 Units, intravenous, Once  heparin, 1,000 Units, intravenous, Once  heparin, 1,000 Units, intravenous, Once  heparin, 1,000 Units, intravenous, Once  insulin lispro, 0-10 Units, subcutaneous, q6h  insulin NPH (Isophane), 12 Units, subcutaneous, Daily  levothyroxine, 200 mcg, oral, Daily before breakfast  multivitamin with minerals, 1 tablet, oral, Daily  nystatin, 4 mL, Swish & Swallow, 4x daily  pantoprazole, 40 mg, oral, BID  perflutren lipid microspheres, 0.5-10 mL of dilution, intravenous, Once in imaging  perflutren protein A microsphere, 0.5 mL, intravenous, Once in imaging  [START ON 2/23/2024] predniSONE, 10 mg, oral, Once  [START ON 2/21/2024] predniSONE, 30 mg, oral, Daily   Followed by  [START ON 2/22/2024] predniSONE, 20 mg, oral, Daily  [Held by provider] rosuvastatin, 40 mg, oral, Nightly  sirolimus, 1 mg, oral, Daily  sulfur hexafluoride microsphr, 2 mL, intravenous, Once in imaging  tacrolimus, 1.5 mg, oral, Daily  tacrolimus, 1.5 mg, oral, " Daily      Continuous medications  [Held by provider] heparin, 0-4,500 Units/hr, Last Rate: Stopped (02/19/24 1830)  lactated Ringer's, 5 mL/hr, Last Rate: 5 mL/hr (02/20/24 0700)  lactated Ringer's, 10 mL/hr, Last Rate: 10 mL/hr (02/20/24 0700)  PrismaSol 4/2.5, 2,400 mL/hr, Last Rate: 2,400 mL/hr (02/19/24 1054)      PRN medications  PRN medications: acetaminophen, calcium carbonate, calcium gluconate, calcium gluconate, dextrose 10 % in water (D10W), dextrose, diphenhydrAMINE, diphenhydrAMINE, fentaNYL PF, glucagon, heparin, HYDROmorphone, HYDROmorphone, lactulose, magnesium sulfate, magnesium sulfate, midazolam, potassium chloride CR **OR** potassium chloride, potassium chloride, promethazine, sodium chloride    Results for orders placed or performed during the hospital encounter of 02/15/24 (from the past 24 hour(s))   BLOOD GAS ARTERIAL FULL PANEL   Result Value Ref Range    POCT pH, Arterial 7.36 (L) 7.38 - 7.42 pH    POCT pCO2, Arterial 30 (L) 38 - 42 mm Hg    POCT pO2, Arterial 115 (H) 85 - 95 mm Hg    POCT SO2, Arterial 98 94 - 100 %    POCT Oxy Hemoglobin, Arterial 96.8 94.0 - 98.0 %    POCT Hematocrit Calculated, Arterial 25.0 (L) 36.0 - 46.0 %    POCT Sodium, Arterial 124 (L) 136 - 145 mmol/L    POCT Potassium, Arterial 4.2 3.5 - 5.3 mmol/L    POCT Chloride, Arterial 94 (L) 98 - 107 mmol/L    POCT Ionized Calcium, Arterial 1.10 1.10 - 1.33 mmol/L    POCT Glucose, Arterial 271 (H) 74 - 99 mg/dL    POCT Lactate, Arterial 2.9 (H) 0.4 - 2.0 mmol/L    POCT Base Excess, Arterial -7.7 (L) -2.0 - 3.0 mmol/L    POCT HCO3 Calculated, Arterial 16.9 (L) 22.0 - 26.0 mmol/L    POCT Hemoglobin, Arterial 8.2 (L) 12.0 - 16.0 g/dL    POCT Anion Gap, Arterial 17 10 - 25 mmo/L    Patient Temperature 37.0 degrees Celsius    FiO2 20 %   Lactate   Result Value Ref Range    Lactate 2.6 (H) 0.4 - 2.0 mmol/L   Heparin Assay, UFH   Result Value Ref Range    Heparin Unfractionated 1.0 See Comment Below for Therapeutic Ranges IU/mL    POCT GLUCOSE   Result Value Ref Range    POCT Glucose 226 (H) 74 - 99 mg/dL   Prepare RBC: 4 Units   Result Value Ref Range    PRODUCT CODE X9920R14     Unit Number [id number]     Unit ABO O     Unit RH POS     XM INTEP COMP     Dispense Status TR     Blood Expiration Date March 11, 2024 23:59 EDT     PRODUCT BLOOD TYPE 5100     UNIT VOLUME 350     PRODUCT CODE P7329E98     Unit Number [id number]     Unit ABO O     Unit RH POS     XM INTEP COMP     Dispense Status TR     Blood Expiration Date March 11, 2024 23:59 EDT     PRODUCT BLOOD TYPE 5100     UNIT VOLUME 350     PRODUCT CODE E6248D01     Unit Number [id number]     Unit ABO O     Unit RH POS     XM INTEP COMP     Dispense Status XM     Blood Expiration Date March 12, 2024 23:59 EDT     PRODUCT BLOOD TYPE 5100     UNIT VOLUME 279     PRODUCT CODE E5337N09     Unit Number [id number]     Unit ABO O     Unit RH POS     XM INTEP COMP     Dispense Status TR     Blood Expiration Date March 12, 2024 23:59 EDT     PRODUCT BLOOD TYPE 5100     UNIT VOLUME 319    Calcium, ionized   Result Value Ref Range    POCT Calcium, Ionized 1.05 (L) 1.1 - 1.33 mmol/L   Renal Function Panel   Result Value Ref Range    Glucose 182 (H) 74 - 99 mg/dL    Sodium 126 (L) 136 - 145 mmol/L    Potassium 4.0 3.5 - 5.3 mmol/L    Chloride 92 (L) 98 - 107 mmol/L    Bicarbonate 17 (L) 21 - 32 mmol/L    Anion Gap 21 (H) 10 - 20 mmol/L    Urea Nitrogen 69 (H) 6 - 23 mg/dL    Creatinine 4.82 (H) 0.50 - 1.05 mg/dL    eGFR 12 (L) >60 mL/min/1.73m*2    Calcium 8.3 (L) 8.6 - 10.6 mg/dL    Phosphorus 6.0 (H) 2.5 - 4.9 mg/dL    Albumin 3.6 3.4 - 5.0 g/dL   Blood Gas Arterial Full Panel Unsolicited   Result Value Ref Range    POCT pH, Arterial 7.41 7.38 - 7.42 pH    POCT pCO2, Arterial 27 (L) 38 - 42 mm Hg    POCT pO2, Arterial 107 (H) 85 - 95 mm Hg    POCT SO2, Arterial 99 94 - 100 %    POCT Oxy Hemoglobin, Arterial 97.2 94.0 - 98.0 %    POCT Hematocrit Calculated, Arterial 25.0 (L)  36.0 - 46.0 %    POCT Sodium, Arterial 124 (L) 136 - 145 mmol/L    POCT Potassium, Arterial 4.4 3.5 - 5.3 mmol/L    POCT Chloride, Arterial 95 (L) 98 - 107 mmol/L    POCT Ionized Calcium, Arterial 1.09 (L) 1.10 - 1.33 mmol/L    POCT Glucose, Arterial 200 (H) 74 - 99 mg/dL    POCT Lactate, Arterial 2.7 (H) 0.4 - 2.0 mmol/L    POCT Base Excess, Arterial -6.6 (L) -2.0 - 3.0 mmol/L    POCT HCO3 Calculated, Arterial 17.1 (L) 22.0 - 26.0 mmol/L    POCT Hemoglobin, Arterial 8.3 (L) 12.0 - 16.0 g/dL    POCT Anion Gap, Arterial 16 10 - 25 mmo/L    Patient Temperature 37.0 degrees Celsius   Magnesium   Result Value Ref Range    Magnesium 2.91 (H) 1.60 - 2.40 mg/dL   Coagulation Screen   Result Value Ref Range    Protime 15.4 (H) 9.8 - 12.8 seconds    INR 1.4 (H) 0.9 - 1.1    aPTT 135 (HH) 27 - 38 seconds   Fibrinogen   Result Value Ref Range    Fibrinogen 166 (L) 200 - 400 mg/dL   CBC   Result Value Ref Range    WBC 8.3 4.4 - 11.3 x10*3/uL    nRBC 13.4 (H) 0.0 - 0.0 /100 WBCs    RBC 2.97 (L) 4.00 - 5.20 x10*6/uL    Hemoglobin 8.0 (L) 12.0 - 16.0 g/dL    Hematocrit 24.3 (L) 36.0 - 46.0 %    MCV 82 80 - 100 fL    MCH 26.9 26.0 - 34.0 pg    MCHC 32.9 32.0 - 36.0 g/dL    RDW 15.2 (H) 11.5 - 14.5 %    Platelets 131 (L) 150 - 450 x10*3/uL   Renal Function Panel   Result Value Ref Range    Glucose 181 (H) 74 - 99 mg/dL    Sodium 126 (L) 136 - 145 mmol/L    Potassium 4.0 3.5 - 5.3 mmol/L    Chloride 92 (L) 98 - 107 mmol/L    Bicarbonate 17 (L) 21 - 32 mmol/L    Anion Gap 21 (H) 10 - 20 mmol/L    Urea Nitrogen 69 (H) 6 - 23 mg/dL    Creatinine 4.91 (H) 0.50 - 1.05 mg/dL    eGFR 11 (L) >60 mL/min/1.73m*2    Calcium 8.3 (L) 8.6 - 10.6 mg/dL    Phosphorus 5.8 (H) 2.5 - 4.9 mg/dL    Albumin 3.5 3.4 - 5.0 g/dL   Blood Gas Arterial Full Panel Unsolicited   Result Value Ref Range    POCT pH, Arterial 7.40 7.38 - 7.42 pH    POCT pCO2, Arterial 27 (L) 38 - 42 mm Hg    POCT pO2, Arterial 477 (H) 85 - 95 mm Hg    POCT SO2, Arterial 100 94 - 100  %    POCT Oxy Hemoglobin, Arterial 98.3 (H) 94.0 - 98.0 %    POCT Hematocrit Calculated, Arterial 22.0 (L) 36.0 - 46.0 %    POCT Sodium, Arterial 124 (L) 136 - 145 mmol/L    POCT Potassium, Arterial 3.9 3.5 - 5.3 mmol/L    POCT Chloride, Arterial 95 (L) 98 - 107 mmol/L    POCT Ionized Calcium, Arterial 1.00 (L) 1.10 - 1.33 mmol/L    POCT Glucose, Arterial 191 (H) 74 - 99 mg/dL    POCT Lactate, Arterial 2.2 (H) 0.4 - 2.0 mmol/L    POCT Base Excess, Arterial -7.3 (L) -2.0 - 3.0 mmol/L    POCT HCO3 Calculated, Arterial 16.7 (L) 22.0 - 26.0 mmol/L    POCT Hemoglobin, Arterial 7.4 (L) 12.0 - 16.0 g/dL    POCT Anion Gap, Arterial 16 10 - 25 mmo/L    Patient Temperature 37.0 degrees Celsius    FiO2 100 %   Coox Panel, Arterial Unsolicited   Result Value Ref Range    POCT Hemoglobin, Arterial 7.4 (L) 12.0 - 16.0 g/dL    POCT Oxy Hemoglobin, Arterial 98.3 (H) 94.0 - 98.0 %    POCT Carboxyhemoglobin, Arterial 1.0 %    POCT Methemoglobin, Arterial 0.6 0.0 - 1.5 %    POCT Deoxy Hemoglobin, Arterial 0.0 0.0 - 5.0 %   Blood Gas Arterial Full Panel   Result Value Ref Range    POCT pH, Arterial 7.41 7.38 - 7.42 pH    POCT pCO2, Arterial 27 (L) 38 - 42 mm Hg    POCT pO2, Arterial 149 (H) 85 - 95 mm Hg    POCT SO2, Arterial 100 94 - 100 %    POCT Oxy Hemoglobin, Arterial 97.9 94.0 - 98.0 %    POCT Hematocrit Calculated, Arterial 25.0 (L) 36.0 - 46.0 %    POCT Sodium, Arterial 124 (L) 136 - 145 mmol/L    POCT Potassium, Arterial 4.2 3.5 - 5.3 mmol/L    POCT Chloride, Arterial 95 (L) 98 - 107 mmol/L    POCT Ionized Calcium, Arterial 1.01 (L) 1.10 - 1.33 mmol/L    POCT Glucose, Arterial 192 (H) 74 - 99 mg/dL    POCT Lactate, Arterial 1.6 0.4 - 2.0 mmol/L    POCT Base Excess, Arterial -6.6 (L) -2.0 - 3.0 mmol/L    POCT HCO3 Calculated, Arterial 17.1 (L) 22.0 - 26.0 mmol/L    POCT Hemoglobin, Arterial 8.4 (L) 12.0 - 16.0 g/dL    POCT Anion Gap, Arterial 16 10 - 25 mmo/L    Patient Temperature 37.0 degrees Celsius    FiO2 100 %   POCT  GLUCOSE   Result Value Ref Range    POCT Glucose 183 (H) 74 - 99 mg/dL   Blood Gas Arterial Full Panel   Result Value Ref Range    POCT pH, Arterial 7.41 7.38 - 7.42 pH    POCT pCO2, Arterial 28 (L) 38 - 42 mm Hg    POCT pO2, Arterial 117 (H) 85 - 95 mm Hg    POCT SO2, Arterial 100 94 - 100 %    POCT Oxy Hemoglobin, Arterial 97.4 94.0 - 98.0 %    POCT Hematocrit Calculated, Arterial 24.0 (L) 36.0 - 46.0 %    POCT Sodium, Arterial 123 (L) 136 - 145 mmol/L    POCT Potassium, Arterial 4.4 3.5 - 5.3 mmol/L    POCT Chloride, Arterial 94 (L) 98 - 107 mmol/L    POCT Ionized Calcium, Arterial 1.04 (L) 1.10 - 1.33 mmol/L    POCT Glucose, Arterial 186 (H) 74 - 99 mg/dL    POCT Lactate, Arterial 1.2 0.4 - 2.0 mmol/L    POCT Base Excess, Arterial -6.1 (L) -2.0 - 3.0 mmol/L    POCT HCO3 Calculated, Arterial 17.7 (L) 22.0 - 26.0 mmol/L    POCT Hemoglobin, Arterial 8.0 (L) 12.0 - 16.0 g/dL    POCT Anion Gap, Arterial 16 10 - 25 mmo/L    Patient Temperature 37.0 degrees Celsius    FiO2 21 %   Magnesium   Result Value Ref Range    Magnesium 2.73 (H) 1.60 - 2.40 mg/dL   Hepatic function panel   Result Value Ref Range    Albumin 2.8 (L) 3.4 - 5.0 g/dL    Bilirubin, Total 1.0 0.0 - 1.2 mg/dL    Bilirubin, Direct 0.6 (H) 0.0 - 0.3 mg/dL    Alkaline Phosphatase 57 33 - 110 U/L     (H) 7 - 45 U/L     (H) 9 - 39 U/L    Total Protein 6.2 (L) 6.4 - 8.2 g/dL   Coagulation Screen   Result Value Ref Range    Protime 15.5 (H) 9.8 - 12.8 seconds    INR 1.4 (H) 0.9 - 1.1    aPTT 62 (H) 27 - 38 seconds   Lactate Dehydrogenase   Result Value Ref Range     (H) 84 - 246 U/L   Phosphorus   Result Value Ref Range    Phosphorus 5.9 (H) 2.5 - 4.9 mg/dL   Basic Metabolic Panel   Result Value Ref Range    Glucose 185 (H) 74 - 99 mg/dL    Sodium 123 (L) 136 - 145 mmol/L    Potassium 4.4 3.5 - 5.3 mmol/L    Chloride 91 (L) 98 - 107 mmol/L    Bicarbonate 18 (L) 21 - 32 mmol/L    Anion Gap 18 10 - 20 mmol/L    Urea Nitrogen 75 (H) 6 - 23  mg/dL    Creatinine 5.26 (H) 0.50 - 1.05 mg/dL    eGFR 10 (L) >60 mL/min/1.73m*2    Calcium 7.5 (L) 8.6 - 10.6 mg/dL   Heparin Assay, UFH   Result Value Ref Range    Heparin Unfractionated 0.8 See Comment Below for Therapeutic Ranges IU/mL   Calcium, Ionized   Result Value Ref Range    POCT Calcium, Ionized 1.01 (L) 1.1 - 1.33 mmol/L   CBC   Result Value Ref Range    WBC 6.6 4.4 - 11.3 x10*3/uL    nRBC 10.5 (H) 0.0 - 0.0 /100 WBCs    RBC 2.53 (L) 4.00 - 5.20 x10*6/uL    Hemoglobin 7.1 (L) 12.0 - 16.0 g/dL    Hematocrit 21.0 (L) 36.0 - 46.0 %    MCV 83 80 - 100 fL    MCH 28.1 26.0 - 34.0 pg    MCHC 33.8 32.0 - 36.0 g/dL    RDW 15.0 (H) 11.5 - 14.5 %    Platelets 101 (L) 150 - 450 x10*3/uL   Prepare RBC: 1 Units   Result Value Ref Range    PRODUCT CODE [id number]     Unit Number [id number]     Unit ABO O     Unit RH POS     XM INTEP COMP     Dispense Status XM     Blood Expiration Date February 28, 2024 23:59 EST     PRODUCT BLOOD TYPE 5100     UNIT VOLUME 333    TEG Clot Lysis Profile   Result Value Ref Range    R (Reaction Time) K >17.0 (H) 4.6 - 9.1 min    MA (Max Amplitude) RT 53.2 52.0 - 70.0 mm    MA ( Fran Amplitude) FF 15.0 15.0 - 32.0 mm   POCT GLUCOSE   Result Value Ref Range    POCT Glucose 173 (H) 74 - 99 mg/dL   CBC   Result Value Ref Range    WBC 6.2 4.4 - 11.3 x10*3/uL    nRBC 11.7 (H) 0.0 - 0.0 /100 WBCs    RBC 2.68 (L) 4.00 - 5.20 x10*6/uL    Hemoglobin 7.7 (L) 12.0 - 16.0 g/dL    Hematocrit 22.5 (L) 36.0 - 46.0 %    MCV 84 80 - 100 fL    MCH 28.7 26.0 - 34.0 pg    MCHC 34.2 32.0 - 36.0 g/dL    RDW 14.9 (H) 11.5 - 14.5 %    Platelets 95 (L) 150 - 450 x10*3/uL   Blood Gas Arterial Full Panel   Result Value Ref Range    POCT pH, Arterial 7.41 7.38 - 7.42 pH    POCT pCO2, Arterial 25 (L) 38 - 42 mm Hg    POCT pO2, Arterial 205 (H) 85 - 95 mm Hg    POCT SO2, Arterial 100 94 - 100 %    POCT Oxy Hemoglobin, Arterial 97.9 94.0 - 98.0 %    POCT Hematocrit Calculated, Arterial 24.0 (L) 36.0 - 46.0 %    POCT  Sodium, Arterial 119 (LL) 136 - 145 mmol/L    POCT Potassium, Arterial 4.8 3.5 - 5.3 mmol/L    POCT Chloride, Arterial 92 (L) 98 - 107 mmol/L    POCT Ionized Calcium, Arterial 1.04 (L) 1.10 - 1.33 mmol/L    POCT Glucose, Arterial 197 (H) 74 - 99 mg/dL    POCT Lactate, Arterial 1.1 0.4 - 2.0 mmol/L    POCT Base Excess, Arterial -7.8 (L) -2.0 - 3.0 mmol/L    POCT HCO3 Calculated, Arterial 15.8 (L) 22.0 - 26.0 mmol/L    POCT Hemoglobin, Arterial 8.1 (L) 12.0 - 16.0 g/dL    POCT Anion Gap, Arterial 16 10 - 25 mmo/L    Patient Temperature 37.0 degrees Celsius    FiO2 21 %   Tacrolimus level   Result Value Ref Range    Tacrolimus  7.5 <=15.0 ng/mL   Sirolimus level   Result Value Ref Range    Sirolimus  4.6 4.0 - 20.0 ng/mL   POCT GLUCOSE   Result Value Ref Range    POCT Glucose 208 (H) 74 - 99 mg/dL   Blood Gas Arterial Full Panel   Result Value Ref Range    POCT pH, Arterial 7.36 (L) 7.38 - 7.42 pH    POCT pCO2, Arterial 28 (L) 38 - 42 mm Hg    POCT pO2, Arterial 200 (H) 85 - 95 mm Hg    POCT SO2, Arterial 100 94 - 100 %    POCT Oxy Hemoglobin, Arterial 97.9 94.0 - 98.0 %    POCT Hematocrit Calculated, Arterial 24.0 (L) 36.0 - 46.0 %    POCT Sodium, Arterial 119 (LL) 136 - 145 mmol/L    POCT Potassium, Arterial 5.0 3.5 - 5.3 mmol/L    POCT Chloride, Arterial 92 (L) 98 - 107 mmol/L    POCT Ionized Calcium, Arterial 1.03 (L) 1.10 - 1.33 mmol/L    POCT Glucose, Arterial 195 (H) 74 - 99 mg/dL    POCT Lactate, Arterial 1.0 0.4 - 2.0 mmol/L    POCT Base Excess, Arterial -8.7 (L) -2.0 - 3.0 mmol/L    POCT HCO3 Calculated, Arterial 15.8 (L) 22.0 - 26.0 mmol/L    POCT Hemoglobin, Arterial 8.1 (L) 12.0 - 16.0 g/dL    POCT Anion Gap, Arterial 16 10 - 25 mmo/L    Patient Temperature 37.0 degrees Celsius    FiO2 21 %   Type and screen   Result Value Ref Range    ABO TYPE O     Rh TYPE POS     ANTIBODY SCREEN NEG    POCT GLUCOSE   Result Value Ref Range    POCT Glucose 203 (H) 74 - 99 mg/dL   CBC   Result Value Ref Range    WBC 7.3  4.4 - 11.3 x10*3/uL    nRBC 9.5 (H) 0.0 - 0.0 /100 WBCs    RBC 2.63 (L) 4.00 - 5.20 x10*6/uL    Hemoglobin 7.5 (L) 12.0 - 16.0 g/dL    Hematocrit 21.8 (L) 36.0 - 46.0 %    MCV 83 80 - 100 fL    MCH 28.5 26.0 - 34.0 pg    MCHC 34.4 32.0 - 36.0 g/dL    RDW 14.9 (H) 11.5 - 14.5 %    Platelets 98 (L) 150 - 450 x10*3/uL   Fibrinogen   Result Value Ref Range    Fibrinogen 181 (L) 200 - 400 mg/dL   Coagulation Screen   Result Value Ref Range    Protime 14.1 (H) 9.8 - 12.8 seconds    INR 1.3 (H) 0.9 - 1.1    aPTT 27 27 - 38 seconds   Calcium, Ionized   Result Value Ref Range    POCT Calcium, Ionized 0.99 (L) 1.1 - 1.33 mmol/L   Magnesium   Result Value Ref Range    Magnesium 2.85 (H) 1.60 - 2.40 mg/dL   Renal function panel   Result Value Ref Range    Glucose 193 (H) 74 - 99 mg/dL    Sodium 120 (LL) 136 - 145 mmol/L    Potassium 5.0 3.5 - 5.3 mmol/L    Chloride 90 (L) 98 - 107 mmol/L    Bicarbonate 15 (L) 21 - 32 mmol/L    Anion Gap 20 10 - 20 mmol/L    Urea Nitrogen 80 (H) 6 - 23 mg/dL    Creatinine 5.78 (H) 0.50 - 1.05 mg/dL    eGFR 9 (L) >60 mL/min/1.73m*2    Calcium 7.5 (L) 8.6 - 10.6 mg/dL    Phosphorus 7.3 (H) 2.5 - 4.9 mg/dL    Albumin 3.0 (L) 3.4 - 5.0 g/dL   Prepare Plasma Exchange: 12 Units   Result Value Ref Range    PRODUCT CODE G3958Q68     Unit Number [id number]     Unit ABO O     Unit RH POS     Dispense Status IS     Blood Expiration Date February 25, 2024 11:00 EST     PRODUCT BLOOD TYPE 5100     UNIT VOLUME 212     PRODUCT CODE D8873A71     Unit Number [id number]     Unit ABO O     Unit RH POS     Dispense Status IS     Blood Expiration Date February 25, 2024 11:00 EST     PRODUCT BLOOD TYPE 5100     UNIT VOLUME 221     PRODUCT CODE W0146M89     Unit Number [id number]     Unit ABO O     Unit RH POS     Dispense Status IS     Blood Expiration Date February 25, 2024 11:00 EST     PRODUCT BLOOD TYPE 5100     UNIT VOLUME 217     PRODUCT CODE O5284M92     Unit Number [id number]     Unit ABO  O     Unit RH POS     Dispense Status IS     Blood Expiration Date February 25, 2024 11:00 EST     PRODUCT BLOOD TYPE 5100     UNIT VOLUME 233     PRODUCT CODE V7974S49     Unit Number [id number]     Unit ABO O     Unit RH POS     Dispense Status IS     Blood Expiration Date February 25, 2024 11:00 EST     PRODUCT BLOOD TYPE 5100     UNIT VOLUME 227     PRODUCT CODE G3988A86     Unit Number [id number]     Unit ABO O     Unit RH POS     Dispense Status IS     Blood Expiration Date February 25, 2024 11:00 EST     PRODUCT BLOOD TYPE 5100     UNIT VOLUME 221     PRODUCT CODE X0995Y80     Unit Number [id number]     Unit ABO O     Unit RH POS     Dispense Status IS     Blood Expiration Date February 25, 2024 11:00 EST     PRODUCT BLOOD TYPE 5100     UNIT VOLUME 301     PRODUCT CODE M0385C51     Unit Number [id number]     Unit ABO O     Unit RH POS     Dispense Status IS     Blood Expiration Date February 25, 2024 11:41 EST     PRODUCT BLOOD TYPE 5100     UNIT VOLUME 388     PRODUCT CODE W5163V44     Unit Number [id number]     Unit ABO O     Unit RH POS     Dispense Status IS     Blood Expiration Date February 25, 2024 11:41 EST     PRODUCT BLOOD TYPE 5100     UNIT VOLUME 231     PRODUCT CODE B7428Z01     Unit Number [id number]     Unit ABO O     Unit RH POS     Dispense Status IS     Blood Expiration Date February 25, 2024 11:41 EST     PRODUCT BLOOD TYPE 5100     UNIT VOLUME 221     PRODUCT CODE D2326G67     Unit Number [id number]     Unit ABO O     Unit RH POS     Dispense Status IS     Blood Expiration Date February 25, 2024 11:41 EST     PRODUCT BLOOD TYPE 5100     UNIT VOLUME 229     PRODUCT CODE M1384N60     Unit Number [id number]     Unit ABO O     Unit RH NEG     Dispense Status IS     Blood Expiration Date February 25, 2024 11:41 EST     PRODUCT BLOOD TYPE 9500     UNIT VOLUME 195    Prepare Plasma   Result Value Ref Range    PRODUCT CODE A9653R25     Unit Number  [id number]     Unit ABO O     Unit RH POS     Dispense Status IS     Blood Expiration Date February 25, 2024 11:41 EST     PRODUCT BLOOD TYPE 5100     UNIT VOLUME 217          Assessment/Plan   Assessment: Ms. 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    Originally presented to [facility] ED on 2/15/24 with complaints of N/V x 3 days and inability to keep medications down. POCUS revealed acute systolic heart failure w/ severe BIV dysfunction when compared to previous images in 11/2023. Also noted to have HIRAM and transaminitis. Immunosuppression notably below therapeutic range. Admitted to HFICU and treated for suspected acute cellular vs. acute antibody mediated rejection; however, cardiac biopsy negative for signs of rejection. Despite rejection therapy, inotropes and MCS via IABP, patient's end organ function continued to worsen without improvement in cardiac function. Ultimately placed on left femoral VA ECMO w/ Dr. Marina on 2/19/2024 and transferred to CTICU.       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 AMR?  #Acute renal failure 2/2 to cardiorenal syndrome   #Acute transaminitis     Donor/Recipient Infectious history:  CMV: -/+ (last collected 2/16/24)  Toxo: -/-   Hep C: -/-    Rejection/Prophylaxis (transplants):  - Steroids: Prednisone taper started 2/18/24 following 3xmethylprednisone pulse: Decrease daily, Po prednisone 60mg, 50mg, 40mg, 30mg, 20mg and then continue on 10mg daily   - Tacrolimus: 1.5mg @ 0630 & 1.5mg @ 1830 w/ daily levels drawn @ 0600  - Serolimus: 1.0mg daily w/ daily levels drawn at 0600  - Tacrolimus goal  troughs: 3-5  - Serolimus goal troughs: 7-9  - Combined goal of 10-12  - Antifungals: nystatin 500,000units Q6  - Antivirals: valcyte 450mg Q48hrs   - Anti PCP & Toxoplasmosis: Bactrim SS daily      Last cardiac biopsy: 2/16/24 with ACR1 and no AMR  Last HLA: 2/16/24 negative for DSAs   Last RHC: 2/16/24 w/ RA 11, PAP 34/14(23), W 19 and C.I. 2.3  Last LHC: 2/18/24 negative for CAV and CAD   Last TTE/BOB: 2/18/24 w/ severe BIV dysfunction   Osteopenia/osteoporosis prophylaxis: Vitamin D3 and calcium supplements  Peptic/gastric ulcer prophylaxis: Pantoprazole 40mg daily   CAV Prophylaxis: Hold Aspirin 81mg daily while on systemic heparin. Hold pravastatin with recent transaminases       - Unclear cause of acute myocardial dysfunction. Differentials include: acute ACR vs. AMR vs. viral myocarditis vs. acute lupus myocarditis; however, unless biopsy missed possible areas of damaged tissue, this should have been observed.   - DSAs remain negative; however, patient may have non-HLA antibodies present. 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   - CAV & CAD via LHC on 2/18/24   - Remains on MCS via right femoral IABP set 1:1 and augmenting appropriately & left femoral VA ECMO w/ appropriate flows ~4L/FIO2 100% and sweep 1.    - Remains on CVVH due to HIRAM. LFTs improved and lactate normalized.    - Completed methylpred steroid pulse w/ 1g Q24 x 3 days (2/16-2/18)  - Continue PO prednisone taper  - Continue optimization of immunosuppression   - Thymoglobulin doses: 2/18 & 2/19   - IVIG + PLEX Session: 2/18  - Repeat Biopsy today    - Appreciate transfusion medicine recommendations for PLEX session today. Will reassess continued therapy pending biopsy results.  - Depending on biopsy results, may or may not need another dose of thymo. If thymo dose necessary: 150mg after PLEX is completed and before IVIG.   -  Once PLEX +/- thymo is completed: administer 10g IVIG. Please pre-medicate with 25mg Benadryl and 650mg  Tylenol 30minutes before treatment   - Continue Tylenol and Benadryl Q6 until above therapies completed   -  Please start valcyte 450mg Q48hrs   -  Please start Bactrim SS daily   -  Please increase nystatin to 500,000 units Q6   - Hold aspirin while on systemic anticoagulation and with worsening thrombocytopenia   - Wean off milrinone and continue ECMO support   - Place on protective precautions

## 2024-02-20 NOTE — H&P
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 She was admitted to HFICU on 2/15 with concern for cardiogenic shock secondary to allograft rejection and decompensated heart failure with multiorgan dysfunction including significant elevation of liver enzymes and nonoliguric acute kidney injury. Endomyocardial biopsy on 2/16 revealed mild ACR with +CD4s and negative HLAs, however multisystem organ failure persisted with increased inotropic requirements. IABP placed on 2/18. Transferred to CTICU on 2/19 for VA ECMO cannulation with Dr. Marina for persistent multisystem dysfunction.     I evaluated the patient with an advanced practice provider. I oversaw the patient's care and participated actively in their medical decision-making    On 2/19, a multi-disciplinary review convened to discuss the care of Ms. Yimi Bowles. Given her persistent requirement for pharmacological and mechanical support, and with the addition of oliguric/anuric renal insufficiency, the decision was made to transfer to CTICU and initiate ECMO.    Plan:  Neuro: received procedural sedation for cannulation; no focal deficits; analgesia PRN for procedural pain; attempt to promote sleep/wake so far as other medical tasks permit  Cardiovascular: Currently maintain ECMO flows to ensure adequate tissue perfusion; IABP and inotropes to facilitate some degree of ejection/contractility; ideally will wean vasoactives to minimum; will defer to HF for management of rejection, including timing of steroid administration and induction therapies  Respiratory: maintaining a patent airway; initial post cannulation ABG unremarkable for gas exchange issues; continue aggressive pulmonary toilet  GI: NPO for now  : briefly initiated on CVVH in HFICU: currently no outstanding metabolic  requirement for CVVH, but will maintain HD access and involve nephrology for continuous therapies  Endo: Assess need for steroid therapies among other interventions for rejection; maintain synthroid; target euglycemia  Heme: required blood transfusion during cannulation secondary to procedural loss; post ECMO hb on ABG acceptable; Maintain active type and screen  ID/Immune: thymoglobulin for rejection; follow up blood cultures; received broad spectrum coverage on admission and skin prophylaxis for cannulation  Dispo: CTICU    Sravanthi MCGILL  CTICU Staff  P. [id number]    Due to the high probability of life threatening clinical decompensation, the patient required critical care time evaluating and managing this patient.  Critical care time included obtaining a history, examining the patient, ordering and reviewing studies, discussing, developing, and implementing a management plan, evaluating the patient's response to treatment, and discussion with other care team providers. I saw and evaluated the patient myself. I reviewed the provider's documentation and discussed the patient with the provider. Critical care time was performed exclusive of billable procedures.    Critical Care Time: 46 minutes
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
## 2024-02-20 NOTE — CONSULTS
Palliative Medicine Consult  Complex medical decision making, symptom management, patient/family support    History obtained from chart review including ED note, H&P, patient's daily progress notes, review of lab/test results, and discussion with primary team and bedside RN.    Subjective    History of Present Illness    This is a 32 yo heart transplant recpient (3/2023) who presented on 2/15/24 with signs of acute cellular rejection on heart biopsy 2/16  and multiorgan dysfunction (new dialysis need (hypervolemia) as well as signs of hepatocellular dysfunction) in the setting of ADHF. Multiple pressors started on admission. IABP started on 2/18.  She was transferred to the [facility] for [facility] management on 2/19. Palliative medicine was consulted for symptom management and concern that she may have imminent need for GOC discussion and goal realignment. Plan for today is to go for repeat biopsy in cath lab., PLEX and [facility].       Introduction to Palliative Care  Met with patient and motherFani, at bedside.   Patient alert and oriented, has capacity to make their own medical decisions at this time.   Patient remains altered, does not have capacity to make their own medical decisions at this time. Unable to participate in ROS or goals of care discussion. Surrogate decision maker is Fani martin. MPOA still current from 2020  Staff present: Tommy Chaudhary MD Palliative Medicine was introduced as a specialty service for patients with serious illness to help with symptom management, improve quality of life, assist with goals of care conversations, navigate complex decision making, and provide support to patients and families. Support and empathy was provided throughout the encounter. Provided reflective listening and presence.     Symptoms  Pain:  Significant pain, related to her positioning, generalized but mostly in her back. Likely MSK. Has not been receiving pain medications due to low BP but now there is more  "room for medication administration. On oxy and tylenol. Tramadol also ordered.   Dyspnea: Only with activity  Fatigue: Significant  Insomnia: poor sleep hygiene  while inpatient.   Drowsiness: Significant  Constipation: Has diarrhea. May be related to scheduled medications here (docusate, miralax and lactulose)  Nausea: Significant, variable, associated with vomiting and dry heaving. Zofran provided minimal benefit, switched to promethazine with good effect.   Appetite: Suppressed. C/b nausea.   Anxiety: Severe, related to illness and admission.  Depression:     Palliative medicine social history:  Patient has 4 children; 2 twins aged 17 and then 11, and 10 year old. They recently had a 2 year transplant anniversary.    Objective    Last Recorded Vitals  /80   Pulse (!) 122   Temp 36 °C (96.8 °F) (Core)   Resp 21   Ht 1.549 m (5' 1\")   Wt 103 kg (226 lb 13.7 oz)   SpO2 100%   BMI 42.86 kg/m²      Physical Exam  Vitals and nursing note reviewed.   Constitutional:       Appearance: Normal appearance. She is obese.      Comments: Shivering. Currently on [facility].    HENT:      Head: Normocephalic and atraumatic.      Mouth/Throat:      Mouth: Mucous membranes are moist.      Pharynx: Oropharynx is clear.   Eyes:      Extraocular Movements: Extraocular movements intact.      Conjunctiva/sclera: Conjunctivae normal.      Pupils: Pupils are equal, round, and reactive to light.   Cardiovascular:      Rate and Rhythm: Regular rhythm. Tachycardia present.      Heart sounds: Normal heart sounds.   Pulmonary:      Effort: Pulmonary effort is normal.   Abdominal:      General: Abdomen is flat.      Palpations: Abdomen is soft.   Musculoskeletal:      Cervical back: Normal range of motion and neck supple.   Skin:     General: Skin is warm and dry.   Neurological:      General: No focal deficit present.      Mental Status: She is alert and oriented to person, place, and time. Mental status is at baseline. "   Psychiatric:         Mood and Affect: Mood normal.         Behavior: Behavior normal.         Thought Content: Thought content normal.         Judgment: Judgment normal.        Relevant Results  Results for orders placed or performed during the hospital encounter of 02/15/24 (from the past 24 hour(s))   POCT GLUCOSE   Result Value Ref Range    POCT Glucose 305 (H) 74 - 99 mg/dL   POCT GLUCOSE   Result Value Ref Range    POCT Glucose 308 (H) 74 - 99 mg/dL   Blood Gas Arterial   Result Value Ref Range    POCT pH, Arterial 7.34 (L) 7.38 - 7.42 pH    POCT pCO2, Arterial 30 (L) 38 - 42 mm Hg    POCT pO2, Arterial 108 (H) 85 - 95 mm Hg    POCT SO2, Arterial 99 94 - 100 %    POCT Oxy Hemoglobin, Arterial 97.4 94.0 - 98.0 %    POCT Base Excess, Arterial -8.3 (L) -2.0 - 3.0 mmol/L    POCT HCO3 Calculated, Arterial 16.2 (L) 22.0 - 26.0 mmol/L    Patient Temperature 37.0 degrees Celsius    FiO2 20 %   Calcium, Ionized CRRT   Result Value Ref Range    POCT CRRT, Calcium Ionized 1.05 Reference range not established mmol/L   Blood Urea Nitrogen   Result Value Ref Range    Urea Nitrogen 79 (H) 6 - 23 mg/dL   Creatinine   Result Value Ref Range    Creatinine 5.60 (H) 0.50 - 1.05 mg/dL    eGFR 10 (L) >60 mL/min/1.73m*2   BLOOD GAS MIXED VENOUS FULL PANEL   Result Value Ref Range    POCT pH, Mixed 7.30 (L) 7.33 - 7.43 pH    POCT pCO2, Mixed 38 (L) 41 - 51 mm Hg    POCT pO2, Mixed 37 35 - 45 mm Hg    POCT SO2, Mixed 51 45 - 75 %    POCT Oxy Hemoglobin, Mixed 50.3 45.0 - 75.0 %    POCT Hematocrit Calculated, Mixed 28.0 (L) 36.0 - 46.0 %    POCT Sodium, Mixed 122 (L) 136 - 145 mmol/L    POCT Potassium, Mixed 4.2 3.5 - 5.3 mmol/L    POCT Chloride, Mixed 92 (L) 98 - 107 mmol/L    POCT Ionized Calcium, Mixed 0.97 (L) 1.10 - 1.33 mmol/L    POCT Glucose, Mixed 296 (H) 74 - 99 mg/dL    POCT Lactate, Mixed 2.7 (H) 0.4 - 2.0 mmol/L    POCT Base Excess, Mixed -7.1 (L) -2.0 - 3.0 mmol/L    POCT HCO3 Calculated, Mixed 18.7 (L) 22.0 - 26.0 mmol/L     POCT Hemoglobin, Mixed 9.2 (L) 12.0 - 16.0 g/dL    POCT Anion Gap, Mixed 16 10 - 25 mmo/L    Patient Temperature 37.0 degrees Celsius    FiO2 20 %   BLOOD GAS MIXED VENOUS FULL PANEL   Result Value Ref Range    POCT pH, Mixed 7.30 (L) 7.33 - 7.43 pH    POCT pCO2, Mixed 37 (L) 41 - 51 mm Hg    POCT pO2, Mixed 40 35 - 45 mm Hg    POCT SO2, Mixed 59 45 - 75 %    POCT Oxy Hemoglobin, Mixed 58.3 45.0 - 75.0 %    POCT Hematocrit Calculated, Mixed 24.0 (L) 36.0 - 46.0 %    POCT Sodium, Mixed 125 (L) 136 - 145 mmol/L    POCT Potassium, Mixed 4.0 3.5 - 5.3 mmol/L    POCT Chloride, Mixed 93 (L) 98 - 107 mmol/L    POCT Ionized Calcium, Mixed 1.11 1.10 - 1.33 mmol/L    POCT Glucose, Mixed 271 (H) 74 - 99 mg/dL    POCT Lactate, Mixed 2.7 (H) 0.4 - 2.0 mmol/L    POCT Base Excess, Mixed -7.6 (L) -2.0 - 3.0 mmol/L    POCT HCO3 Calculated, Mixed 18.2 (L) 22.0 - 26.0 mmol/L    POCT Hemoglobin, Mixed 8.1 (L) 12.0 - 16.0 g/dL    POCT Anion Gap, Mixed 18 10 - 25 mmo/L    Patient Temperature 37.0 degrees Celsius    FiO2 20 %   BLOOD GAS ARTERIAL FULL PANEL   Result Value Ref Range    POCT pH, Arterial 7.36 (L) 7.38 - 7.42 pH    POCT pCO2, Arterial 30 (L) 38 - 42 mm Hg    POCT pO2, Arterial 115 (H) 85 - 95 mm Hg    POCT SO2, Arterial 98 94 - 100 %    POCT Oxy Hemoglobin, Arterial 96.8 94.0 - 98.0 %    POCT Hematocrit Calculated, Arterial 25.0 (L) 36.0 - 46.0 %    POCT Sodium, Arterial 124 (L) 136 - 145 mmol/L    POCT Potassium, Arterial 4.2 3.5 - 5.3 mmol/L    POCT Chloride, Arterial 94 (L) 98 - 107 mmol/L    POCT Ionized Calcium, Arterial 1.10 1.10 - 1.33 mmol/L    POCT Glucose, Arterial 271 (H) 74 - 99 mg/dL    POCT Lactate, Arterial 2.9 (H) 0.4 - 2.0 mmol/L    POCT Base Excess, Arterial -7.7 (L) -2.0 - 3.0 mmol/L    POCT HCO3 Calculated, Arterial 16.9 (L) 22.0 - 26.0 mmol/L    POCT Hemoglobin, Arterial 8.2 (L) 12.0 - 16.0 g/dL    POCT Anion Gap, Arterial 17 10 - 25 mmo/L    Patient Temperature 37.0 degrees Celsius    FiO2 20 %    Lactate   Result Value Ref Range    Lactate 2.6 (H) 0.4 - 2.0 mmol/L   Heparin Assay, UFH   Result Value Ref Range    Heparin Unfractionated 1.0 See Comment Below for Therapeutic Ranges IU/mL   POCT GLUCOSE   Result Value Ref Range    POCT Glucose 226 (H) 74 - 99 mg/dL   Prepare RBC: 4 Units   Result Value Ref Range    PRODUCT CODE Y2545O76     Unit Number [id number]     Unit ABO O     Unit RH POS     XM INTEP COMP     Dispense Status TR     Blood Expiration Date March 11, 2024 23:59 EDT     PRODUCT BLOOD TYPE 5100     UNIT VOLUME 350     PRODUCT CODE Q4922G88     Unit Number [id number]     Unit ABO O     Unit RH POS     XM INTEP COMP     Dispense Status TR     Blood Expiration Date March 11, 2024 23:59 EDT     PRODUCT BLOOD TYPE 5100     UNIT VOLUME 350     PRODUCT CODE [id number]     Unit Number [id number]     Unit ABO O     Unit RH POS     XM INTEP COMP     Dispense Status XM     Blood Expiration Date March 12, 2024 23:59 EDT     PRODUCT BLOOD TYPE 5100     UNIT VOLUME 279     PRODUCT CODE [id number]     Unit Number [id number]     Unit ABO O     Unit RH POS     XM INTEP COMP     Dispense Status TR     Blood Expiration Date March 12, 2024 23:59 EDT     PRODUCT BLOOD TYPE 5100     UNIT VOLUME 319    Calcium, ionized   Result Value Ref Range    POCT Calcium, Ionized 1.05 (L) 1.1 - 1.33 mmol/L   Renal Function Panel   Result Value Ref Range    Glucose 182 (H) 74 - 99 mg/dL    Sodium 126 (L) 136 - 145 mmol/L    Potassium 4.0 3.5 - 5.3 mmol/L    Chloride 92 (L) 98 - 107 mmol/L    Bicarbonate 17 (L) 21 - 32 mmol/L    Anion Gap 21 (H) 10 - 20 mmol/L    Urea Nitrogen 69 (H) 6 - 23 mg/dL    Creatinine 4.82 (H) 0.50 - 1.05 mg/dL    eGFR 12 (L) >60 mL/min/1.73m*2    Calcium 8.3 (L) 8.6 - 10.6 mg/dL    Phosphorus 6.0 (H) 2.5 - 4.9 mg/dL    Albumin 3.6 3.4 - 5.0 g/dL   Blood Gas Arterial Full Panel Unsolicited   Result Value Ref Range    POCT pH, Arterial 7.41 7.38 - 7.42 pH    POCT pCO2, Arterial 27 (L) 38 -  42 mm Hg    POCT pO2, Arterial 107 (H) 85 - 95 mm Hg    POCT SO2, Arterial 99 94 - 100 %    POCT Oxy Hemoglobin, Arterial 97.2 94.0 - 98.0 %    POCT Hematocrit Calculated, Arterial 25.0 (L) 36.0 - 46.0 %    POCT Sodium, Arterial 124 (L) 136 - 145 mmol/L    POCT Potassium, Arterial 4.4 3.5 - 5.3 mmol/L    POCT Chloride, Arterial 95 (L) 98 - 107 mmol/L    POCT Ionized Calcium, Arterial 1.09 (L) 1.10 - 1.33 mmol/L    POCT Glucose, Arterial 200 (H) 74 - 99 mg/dL    POCT Lactate, Arterial 2.7 (H) 0.4 - 2.0 mmol/L    POCT Base Excess, Arterial -6.6 (L) -2.0 - 3.0 mmol/L    POCT HCO3 Calculated, Arterial 17.1 (L) 22.0 - 26.0 mmol/L    POCT Hemoglobin, Arterial 8.3 (L) 12.0 - 16.0 g/dL    POCT Anion Gap, Arterial 16 10 - 25 mmo/L    Patient Temperature 37.0 degrees Celsius   Magnesium   Result Value Ref Range    Magnesium 2.91 (H) 1.60 - 2.40 mg/dL   Coagulation Screen   Result Value Ref Range    Protime 15.4 (H) 9.8 - 12.8 seconds    INR 1.4 (H) 0.9 - 1.1    aPTT 135 (HH) 27 - 38 seconds   Fibrinogen   Result Value Ref Range    Fibrinogen 166 (L) 200 - 400 mg/dL   CBC   Result Value Ref Range    WBC 8.3 4.4 - 11.3 x10*3/uL    nRBC 13.4 (H) 0.0 - 0.0 /100 WBCs    RBC 2.97 (L) 4.00 - 5.20 x10*6/uL    Hemoglobin 8.0 (L) 12.0 - 16.0 g/dL    Hematocrit 24.3 (L) 36.0 - 46.0 %    MCV 82 80 - 100 fL    MCH 26.9 26.0 - 34.0 pg    MCHC 32.9 32.0 - 36.0 g/dL    RDW 15.2 (H) 11.5 - 14.5 %    Platelets 131 (L) 150 - 450 x10*3/uL   Renal Function Panel   Result Value Ref Range    Glucose 181 (H) 74 - 99 mg/dL    Sodium 126 (L) 136 - 145 mmol/L    Potassium 4.0 3.5 - 5.3 mmol/L    Chloride 92 (L) 98 - 107 mmol/L    Bicarbonate 17 (L) 21 - 32 mmol/L    Anion Gap 21 (H) 10 - 20 mmol/L    Urea Nitrogen 69 (H) 6 - 23 mg/dL    Creatinine 4.91 (H) 0.50 - 1.05 mg/dL    eGFR 11 (L) >60 mL/min/1.73m*2    Calcium 8.3 (L) 8.6 - 10.6 mg/dL    Phosphorus 5.8 (H) 2.5 - 4.9 mg/dL    Albumin 3.5 3.4 - 5.0 g/dL   Blood Gas Arterial Full Panel Unsolicited    Result Value Ref Range    POCT pH, Arterial 7.40 7.38 - 7.42 pH    POCT pCO2, Arterial 27 (L) 38 - 42 mm Hg    POCT pO2, Arterial 477 (H) 85 - 95 mm Hg    POCT SO2, Arterial 100 94 - 100 %    POCT Oxy Hemoglobin, Arterial 98.3 (H) 94.0 - 98.0 %    POCT Hematocrit Calculated, Arterial 22.0 (L) 36.0 - 46.0 %    POCT Sodium, Arterial 124 (L) 136 - 145 mmol/L    POCT Potassium, Arterial 3.9 3.5 - 5.3 mmol/L    POCT Chloride, Arterial 95 (L) 98 - 107 mmol/L    POCT Ionized Calcium, Arterial 1.00 (L) 1.10 - 1.33 mmol/L    POCT Glucose, Arterial 191 (H) 74 - 99 mg/dL    POCT Lactate, Arterial 2.2 (H) 0.4 - 2.0 mmol/L    POCT Base Excess, Arterial -7.3 (L) -2.0 - 3.0 mmol/L    POCT HCO3 Calculated, Arterial 16.7 (L) 22.0 - 26.0 mmol/L    POCT Hemoglobin, Arterial 7.4 (L) 12.0 - 16.0 g/dL    POCT Anion Gap, Arterial 16 10 - 25 mmo/L    Patient Temperature 37.0 degrees Celsius    FiO2 100 %   Coox Panel, Arterial Unsolicited   Result Value Ref Range    POCT Hemoglobin, Arterial 7.4 (L) 12.0 - 16.0 g/dL    POCT Oxy Hemoglobin, Arterial 98.3 (H) 94.0 - 98.0 %    POCT Carboxyhemoglobin, Arterial 1.0 %    POCT Methemoglobin, Arterial 0.6 0.0 - 1.5 %    POCT Deoxy Hemoglobin, Arterial 0.0 0.0 - 5.0 %   Blood Gas Arterial Full Panel   Result Value Ref Range    POCT pH, Arterial 7.41 7.38 - 7.42 pH    POCT pCO2, Arterial 27 (L) 38 - 42 mm Hg    POCT pO2, Arterial 149 (H) 85 - 95 mm Hg    POCT SO2, Arterial 100 94 - 100 %    POCT Oxy Hemoglobin, Arterial 97.9 94.0 - 98.0 %    POCT Hematocrit Calculated, Arterial 25.0 (L) 36.0 - 46.0 %    POCT Sodium, Arterial 124 (L) 136 - 145 mmol/L    POCT Potassium, Arterial 4.2 3.5 - 5.3 mmol/L    POCT Chloride, Arterial 95 (L) 98 - 107 mmol/L    POCT Ionized Calcium, Arterial 1.01 (L) 1.10 - 1.33 mmol/L    POCT Glucose, Arterial 192 (H) 74 - 99 mg/dL    POCT Lactate, Arterial 1.6 0.4 - 2.0 mmol/L    POCT Base Excess, Arterial -6.6 (L) -2.0 - 3.0 mmol/L    POCT HCO3 Calculated, Arterial 17.1  (L) 22.0 - 26.0 mmol/L    POCT Hemoglobin, Arterial 8.4 (L) 12.0 - 16.0 g/dL    POCT Anion Gap, Arterial 16 10 - 25 mmo/L    Patient Temperature 37.0 degrees Celsius    FiO2 100 %   POCT GLUCOSE   Result Value Ref Range    POCT Glucose 183 (H) 74 - 99 mg/dL   Blood Gas Arterial Full Panel   Result Value Ref Range    POCT pH, Arterial 7.41 7.38 - 7.42 pH    POCT pCO2, Arterial 28 (L) 38 - 42 mm Hg    POCT pO2, Arterial 117 (H) 85 - 95 mm Hg    POCT SO2, Arterial 100 94 - 100 %    POCT Oxy Hemoglobin, Arterial 97.4 94.0 - 98.0 %    POCT Hematocrit Calculated, Arterial 24.0 (L) 36.0 - 46.0 %    POCT Sodium, Arterial 123 (L) 136 - 145 mmol/L    POCT Potassium, Arterial 4.4 3.5 - 5.3 mmol/L    POCT Chloride, Arterial 94 (L) 98 - 107 mmol/L    POCT Ionized Calcium, Arterial 1.04 (L) 1.10 - 1.33 mmol/L    POCT Glucose, Arterial 186 (H) 74 - 99 mg/dL    POCT Lactate, Arterial 1.2 0.4 - 2.0 mmol/L    POCT Base Excess, Arterial -6.1 (L) -2.0 - 3.0 mmol/L    POCT HCO3 Calculated, Arterial 17.7 (L) 22.0 - 26.0 mmol/L    POCT Hemoglobin, Arterial 8.0 (L) 12.0 - 16.0 g/dL    POCT Anion Gap, Arterial 16 10 - 25 mmo/L    Patient Temperature 37.0 degrees Celsius    FiO2 21 %   Magnesium   Result Value Ref Range    Magnesium 2.73 (H) 1.60 - 2.40 mg/dL   Hepatic function panel   Result Value Ref Range    Albumin 2.8 (L) 3.4 - 5.0 g/dL    Bilirubin, Total 1.0 0.0 - 1.2 mg/dL    Bilirubin, Direct 0.6 (H) 0.0 - 0.3 mg/dL    Alkaline Phosphatase 57 33 - 110 U/L     (H) 7 - 45 U/L     (H) 9 - 39 U/L    Total Protein 6.2 (L) 6.4 - 8.2 g/dL   Coagulation Screen   Result Value Ref Range    Protime 15.5 (H) 9.8 - 12.8 seconds    INR 1.4 (H) 0.9 - 1.1    aPTT 62 (H) 27 - 38 seconds   Lactate Dehydrogenase   Result Value Ref Range     (H) 84 - 246 U/L   Phosphorus   Result Value Ref Range    Phosphorus 5.9 (H) 2.5 - 4.9 mg/dL   Basic Metabolic Panel   Result Value Ref Range    Glucose 185 (H) 74 - 99 mg/dL    Sodium 123 (L)  136 - 145 mmol/L    Potassium 4.4 3.5 - 5.3 mmol/L    Chloride 91 (L) 98 - 107 mmol/L    Bicarbonate 18 (L) 21 - 32 mmol/L    Anion Gap 18 10 - 20 mmol/L    Urea Nitrogen 75 (H) 6 - 23 mg/dL    Creatinine 5.26 (H) 0.50 - 1.05 mg/dL    eGFR 10 (L) >60 mL/min/1.73m*2    Calcium 7.5 (L) 8.6 - 10.6 mg/dL   Heparin Assay, UFH   Result Value Ref Range    Heparin Unfractionated 0.8 See Comment Below for Therapeutic Ranges IU/mL   Calcium, Ionized   Result Value Ref Range    POCT Calcium, Ionized 1.01 (L) 1.1 - 1.33 mmol/L   CBC   Result Value Ref Range    WBC 6.6 4.4 - 11.3 x10*3/uL    nRBC 10.5 (H) 0.0 - 0.0 /100 WBCs    RBC 2.53 (L) 4.00 - 5.20 x10*6/uL    Hemoglobin 7.1 (L) 12.0 - 16.0 g/dL    Hematocrit 21.0 (L) 36.0 - 46.0 %    MCV 83 80 - 100 fL    MCH 28.1 26.0 - 34.0 pg    MCHC 33.8 32.0 - 36.0 g/dL    RDW 15.0 (H) 11.5 - 14.5 %    Platelets 101 (L) 150 - 450 x10*3/uL   Prepare RBC: 1 Units   Result Value Ref Range    PRODUCT CODE [id number]     Unit Number [id number]     Unit ABO O     Unit RH POS     XM INTEP COMP     Dispense Status XM     Blood Expiration Date February 28, 2024 23:59 EST     PRODUCT BLOOD TYPE 5100     UNIT VOLUME 333    TEG Clot Lysis Profile   Result Value Ref Range    R (Reaction Time) K >17.0 (H) 4.6 - 9.1 min    MA (Max Amplitude) RT 53.2 52.0 - 70.0 mm    MA ( Fran Amplitude) FF 15.0 15.0 - 32.0 mm   POCT GLUCOSE   Result Value Ref Range    POCT Glucose 173 (H) 74 - 99 mg/dL   CBC   Result Value Ref Range    WBC 6.2 4.4 - 11.3 x10*3/uL    nRBC 11.7 (H) 0.0 - 0.0 /100 WBCs    RBC 2.68 (L) 4.00 - 5.20 x10*6/uL    Hemoglobin 7.7 (L) 12.0 - 16.0 g/dL    Hematocrit 22.5 (L) 36.0 - 46.0 %    MCV 84 80 - 100 fL    MCH 28.7 26.0 - 34.0 pg    MCHC 34.2 32.0 - 36.0 g/dL    RDW 14.9 (H) 11.5 - 14.5 %    Platelets 95 (L) 150 - 450 x10*3/uL   Blood Gas Arterial Full Panel   Result Value Ref Range    POCT pH, Arterial 7.41 7.38 - 7.42 pH    POCT pCO2, Arterial 25 (L) 38 - 42 mm Hg    POCT pO2,  Arterial 205 (H) 85 - 95 mm Hg    POCT SO2, Arterial 100 94 - 100 %    POCT Oxy Hemoglobin, Arterial 97.9 94.0 - 98.0 %    POCT Hematocrit Calculated, Arterial 24.0 (L) 36.0 - 46.0 %    POCT Sodium, Arterial 119 (LL) 136 - 145 mmol/L    POCT Potassium, Arterial 4.8 3.5 - 5.3 mmol/L    POCT Chloride, Arterial 92 (L) 98 - 107 mmol/L    POCT Ionized Calcium, Arterial 1.04 (L) 1.10 - 1.33 mmol/L    POCT Glucose, Arterial 197 (H) 74 - 99 mg/dL    POCT Lactate, Arterial 1.1 0.4 - 2.0 mmol/L    POCT Base Excess, Arterial -7.8 (L) -2.0 - 3.0 mmol/L    POCT HCO3 Calculated, Arterial 15.8 (L) 22.0 - 26.0 mmol/L    POCT Hemoglobin, Arterial 8.1 (L) 12.0 - 16.0 g/dL    POCT Anion Gap, Arterial 16 10 - 25 mmo/L    Patient Temperature 37.0 degrees Celsius    FiO2 21 %   POCT GLUCOSE   Result Value Ref Range    POCT Glucose 208 (H) 74 - 99 mg/dL   Blood Gas Arterial Full Panel   Result Value Ref Range    POCT pH, Arterial 7.36 (L) 7.38 - 7.42 pH    POCT pCO2, Arterial 28 (L) 38 - 42 mm Hg    POCT pO2, Arterial 200 (H) 85 - 95 mm Hg    POCT SO2, Arterial 100 94 - 100 %    POCT Oxy Hemoglobin, Arterial 97.9 94.0 - 98.0 %    POCT Hematocrit Calculated, Arterial 24.0 (L) 36.0 - 46.0 %    POCT Sodium, Arterial 119 (LL) 136 - 145 mmol/L    POCT Potassium, Arterial 5.0 3.5 - 5.3 mmol/L    POCT Chloride, Arterial 92 (L) 98 - 107 mmol/L    POCT Ionized Calcium, Arterial 1.03 (L) 1.10 - 1.33 mmol/L    POCT Glucose, Arterial 195 (H) 74 - 99 mg/dL    POCT Lactate, Arterial 1.0 0.4 - 2.0 mmol/L    POCT Base Excess, Arterial -8.7 (L) -2.0 - 3.0 mmol/L    POCT HCO3 Calculated, Arterial 15.8 (L) 22.0 - 26.0 mmol/L    POCT Hemoglobin, Arterial 8.1 (L) 12.0 - 16.0 g/dL    POCT Anion Gap, Arterial 16 10 - 25 mmo/L    Patient Temperature 37.0 degrees Celsius    FiO2 21 %      Cardiac catheterization - coronary     [facility], Cath Lab, [address] [place] [address]    Cardiovascular Catheterization Report    Patient Name:       MIGUEL BASS Performing Physician: [id number] Geovanna Kitchen MD  Study Date:        2/18/2024             Verifying Physician:  [id number] Geovanna Kitchen MD  MRN/PID:           [id number]              Cardiologist:         Oli Price MD  Accession#:        [id number]          Ordering Provider:    Oli Price MD  Date of Birth/Age: 1990 / 33 years   Fellow:               [id number] Carl Gillombardo MD  Gender:            F  Encounter#:        [id number]       Study:            Left Heart Cath  Additional Study: IABP - Intra-aortic Balloon Pump Insertion       Indications:  MIGUEL BASS is a 34 year old female who presents with Heart Transplant rejection and Shortness of breath. Left ventricular dysfunction and post cardiac transplant.     Procedure Description:  After infiltration with 2% Lidocaine, the right femoral artery was cannulated with a modified Seldinger technique. Subsequently a 6 Ukrainian sheath was placed retrograde in the right femoral artery. The arterial sheath was sized up to 8 Ukrainian. Selective coronary catheterization was performed using a 6 Fr catheter(s) exchanged over a guide wire to cannulate the coronary arteries. A JL 5 tip catheter was used for left coronary injections. A JR 5 tip catheter was used for right coronary injections.  Multiple injections of contrast were made into the left and right coronary arteries with angiograms recorded in multiple projections. Femoral Angio in GREGORY showed access in CFA.  The patient had an Intra-aortic Balloon Pump (IABP) inserted in the cath lab via the right femoral artery. The balloon timing was 1  to 1.     Coronary Angiography:  The coronary circulation is right dominant.     Coronary Angiography Comments:  LMCA Normal  LAD Large and normal  LCx Normal  RCA Dominant and normal.       Hemo Personnel:  +----------------------+-------------+  Name                  Duty           +----------------------+-------------+  Christy Kitchen MD MD 1  +----------------------+-------------+  Carmen Rosenthal RN      PROC CIRC 1  +----------------------+-------------+  Yoel Douglas RN PROC RECORD 1  +----------------------+-------------+  Gillombardo, Carl B MDFELLOW PHYS 1  +----------------------+-------------+       Hemodynamic Pressures:     +----+--------------------+----------+-------------+--------------+---------+  Site     Date Time      Phase NameSystolic mmHgDiastolic mmHgMean mmHg  +----+--------------------+----------+-------------+--------------+---------+    AO2/18/2024 5:18:06 PM      Rest           92            55       68  +----+--------------------+----------+-------------+--------------+---------+   Art2/18/2024 5:18:06 PM      Rest          100            65       78  +----+--------------------+----------+-------------+--------------+---------+    AO2/18/2024 5:23:24 PM      Rest           82            49       61  +----+--------------------+----------+-------------+--------------+---------+   Art2/18/2024 5:23:24 PM      Rest           93            60       72  +----+--------------------+----------+-------------+--------------+---------+       Cardiac Cath Post Procedure Notes:  Post Procedure Diagnosis: Normal coronaries. LVEDP 28. IABP placed via RFA.  Blood Loss:               Estimated blood loss during the procedure was 20 mls.  Specimens Removed:        Number of specimen(s) removed: none.       Recommendations:  Transfer the patient back to HF ICU.  Follow recommendations per Dr Price/DELANEY-Tx  team.    ____________________________________________________________________________________  CONCLUSIONS:   1. S/p Orthotopic Heart transplant with Antibody mediated rejection and hemodynamic compromise - Cardiogenic shock. Patient maintained on dual inotropes.   2. Elevated LVEDP.   3. Coronary angio: Rt dominant system. Normal coronaries.   4. Total contrast used: 15 ml (Minimized in view of HIRAM).   5. IABP placed via Rt CFA with 1:1 augmentation.    ICD 10 Codes:  Heart transplant rejection-T86.21     CPT Codes:  Left Heart Cath (visualization of coronaries) and LV-81211; Intra aortic Balloon Pump Placement (percutaneous)-04375; Moderate Sedation Services 1st additional 15 minutes patient >5 years-74940; Moderate Sedation Services 2nd additional 15 minutes patient >5 years-90165; Moderate Sedation Services 3rd additional 15 minutes patient >5 years-17858     [id number] Geovanna Kitchen MD  Performing Physician  Electronically signed by [id number] Geovanna Kitchen MD on 2/19/2024 at 7:17:06  PM         ** Final **  XR abdomen 1 view, XR chest 1 view  Narrative: STUDY:  XR CHEST 1 VIEW; XR ABDOMEN 1 VIEW;  2/19/2024 6:57 pm; 2/19/2024  6:56 pm      INDICATION:  Signs/Symptoms:s/p ecmo.      COMPARISON:  Chest radiograph 02/19/2024      ACCESSION NUMBER(S):  [id number]; [id number]      ORDERING CLINICIAN:  LUBNA RIBERA      FINDINGS:  AP views of the chest and abdomen were provided.      Right IJ [place]-Estevan catheter with tip overlying the right main  pulmonary artery. ECMO cannulas with tips overlying the upper right  atrium and expected location of the left common iliac. IABP with  radiopaque marker overlying the aortic knob. LeftIJ central venous  catheter with tip overlying the mid SVC. Postsurgical changes from  median sternotomy.      CARDIOMEDIASTINAL SILHOUETTE:  Cardiomediastinal silhouette is stable in size and configuration.      LUNGS:  Low lung volumes contributing to bronchovascular crowding. No  focal  consolidation, pleural effusion, or pneumothorax.      ABDOMEN:  Nonobstructive bowel gas pattern. Limited evaluation of  pneumoperitoneum on supine imaging, however there is no evidence of  gross free air.      BONES:  No acute osseous changes.      Impression: 1.  No evidence of acute cardiopulmonary process.  2.  Nonobstructive bowel gas pattern.  3.  Postsurgical changes and medical devices as described above.      I personally reviewed the images/study and I agree with Charity Ross DO's (radiology resident) findings as stated. This study  was interpreted at [place], [place].      MACRO:  None          Dictation workstation:   [id number]  XR chest 1 view  Narrative: Interpreted By:  Duarte Guillory and Ogievich Taessa   STUDY:  XR CHEST 1 VIEW;  2/19/2024 9:48 am      INDICATION:  Signs/Symptoms:[place] confirmation. Per EMR: Day 4 of admission  presenting with cardiogenic shock.      COMPARISON:  Chest x-ray 02/18/2024, 02/17/2024, 02/16/2024      ACCESSION NUMBER(S):  [id number]      ORDERING CLINICIAN:  HENRY RAPHAEL      FINDINGS:  AP semi-upright radiograph of the chest      LINES/TUBE/DEVICES:  Right-sided IJ approach [place]-Estevan catheter is visualized with the  distal tip projecting over the expected region of the right main  pulmonary artery. Similar positioning of a left IJ central venous  catheter with distal tip overlying the left brachiocephalic vein.  Intra-aortic balloon pump (IABP) overlying the approximate level of  the junction of the distal aortic arch and proximal descending  thoracic aorta. Stable postsurgical changes of median sternotomy with  sternotomy wires intact and surgical clips overlying the mediastinum.      CARDIOMEDIASTINAL SILHOUETTE:  The cardiomediastinal silhouette is enlarged but is similar to  previous..      LUNGS:  Similar mild perihilar vascular congestion. Similar density at left  lung base suggesting  atelectasis, consolidation, or edema and small  pleural effusion. No evidence of pneumothorax.      BONES:  No acute osseous abnormality.      Impression: 1. Similar cardiomegaly with perihilar vascular congestion.  2. Mild left basilar atelectasis, consolidation, or edema. Probable  small left pleural effusion, similar to prior.  3. Medical devices as described above.      I personally reviewed the images/study and I agree with the findings  as stated by Heather Lopez DO, PGY-2. This study was interpreted  at [place], [place].      Signed by: Duarte Guillory 2/19/2024 2:21 PM  Dictation workstation:   [id number]  Transthoracic Echo (TTE) Limited     [facility], [address] [place] [address]                 Tel [phone] and Fax [phone]    TRANSTHORACIC ECHOCARDIOGRAM REPORT       Patient Name:     MIGUEL DIMAS      Reading           [id number] Fabrice BASS                Physician:        MD  Study Date:       2/18/2024            Ordering          [id number] ZEESHAN RAHMAN                                         Provider:  MRN/PID:          [id number]             Fellow:           [id number] Zeeshan Rahman MD  Accession#:       [id number]         Nurse:  Date of           1990 / 33 years  Sonographer:      RENETTA RAHMAN  Birth/Age:  Gender:           F                    Additional Staff:  Height:                                Admit Date:       2/15/2024  Weight:                                Admission Status: Inpatient - STAT  BSA / BMI:        m2 / kg/m2           Encounter#:       [id number]    Study Type:    TRANSTHORACIC ECHO (TTE) LIMITED  Diagnosis/ICD: 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 The patient is in atrial fibrillation which may influence the estimate  of left ventricular function and transvalvular flows. There is global hypokinesis of the left ventricle with minor regional variations. The left ventricular cavity size is normal. There is mild concentric left ventricular hypertrophy. Left ventricular diastolic filling was not assessed.  Left Atrium: The left atrium is suggestive of transplant.  Right Ventricle: The right ventricle is normal in size. There is reduced right ventricular systolic function. [place]-Estevan catheter noted in the right ventricle/right atrium.  Right Atrium: The right atrium is suggestive of a transplant. There is a device visualized in the right atrium.  Aortic Valve: The aortic valve is trileaflet. There is no evidence of aortic valve regurgitation.  Mitral Valve: The mitral valve is normal in structure. There is mild to moderate mitral valve regurgitation.  Tricuspid Valve: The tricuspid valve is structurally normal. There is mild to moderate tricuspid regurgitation.  Pulmonic Valve: The pulmonic valve is structurally normal. There is trace pulmonic valve regurgitation.  Pericardium: There is a small pericardial effusion.  Aorta: The aortic root is normal.  Systemic Veins: The inferior vena cava appears to be of normal size. There is less than 50% IVC collapse with inspiration.  In comparison to the previous echocardiogram(s): Compared with study from 2/17/2024, no significant change.       CONCLUSIONS:   1. Left ventricular systolic function is moderately decreased with a 35% estimated ejection fraction.   2. There is global hypokinesis of the left ventricle with minor regional variations.   3. There is reduced right ventricular systolic function.   4. Mild to moderate mitral valve regurgitation.   5. Mild to moderate tricuspid regurgitation visualized.   6. Patient is markedly tachycardic at the time of the echocardiogram.    QUANTITATIVE DATA SUMMARY:     [id number] Fabrice Price MD  Electronically signed on 2/19/2024 at 12:41:56 PM       **  Final **  ECG 12 lead  Sinus tachycardia  Possible Left atrial enlargement  Indeterminate axis  Low voltage QRS  Cannot rule out Anterior infarct (cited on or before 14-JUN-2023)  Abnormal ECG  When compared with ECG of 15-FEB-2024 21:11,  Questionable change in QRS axis  US renal complete  Narrative: Interpreted By:  Jacob Renner,  and Konstantin Boss   STUDY:  US RENAL COMPLETE;  2/19/2024 2:16 am      INDICATION:  Signs/Symptoms:new rising Cr, HIRAM, oliguria; renal vasculature  assessed previously; looking into parenchyma/renal pelvis/ureters.      COMPARISON:  None.      ACCESSION NUMBER(S):  [id number]      ORDERING CLINICIAN:  HENRY RAPHAEL      TECHNIQUE:  Multiple images of the kidneys were obtained  .      FINDINGS:  RIGHT KIDNEY:  The right kidney measures 9.9 cm in length. The renal cortical  echogenicity and thickness are within normal limits. No  hydronephrosis is present; no evidence of nephrolithiasis.      LEFT KIDNEY:  The left kidney measures 9.7 cm in length. The renal cortical  echogenicity and thickness are within normal limits. No  hydronephrosis is present; no evidence of nephrolithiasis.      BLADDER:  The urinary bladder is unremarkable in appearance.      There is a trace amount of perihepatic free fluid.      Impression: 1. Unremarkable renal ultrasound.      I personally reviewed the image(s) / study and I agree with the  findings as stated by Gera Pryor MD. This study was interpreted at  [facility], [place], [place].      MACRO:  None      Signed by: Jacob Renner 2/19/2024 5:46 AM  Dictation workstation:   [id number]     Encounter Date: 02/15/24   ECG 12 lead   Result Value    Ventricular Rate 128    Atrial Rate 128    WA Interval 128    QRS Duration 86    QT Interval 314    QTC Calculation(Bazett) 458    P Axis 71    R Axis 116    T Axis 60    QRS Count 21    Q Onset 223    P Onset 159    P Offset 208    T Offset 380    QTC Fredericia 404    Narrative    Sinus  tachycardia  Possible Left atrial enlargement  Indeterminate axis  Low voltage QRS  Cannot rule out Anterior infarct (cited on or before 14-JUN-2023)  Abnormal ECG  When compared with ECG of 15-FEB-2024 21:11,  Questionable change in QRS axis        Allergies  Mycophenolate mofetil, Dapagliflozin, Empagliflozin, Topiramate, and Latex    Scheduled medications  aspirin, 81 mg, oral, Daily  calcitriol, 0.5 mcg, oral, Daily  calcium carbonate, 1,250 mg, oral, BID with meals  ferrous sulfate (325 mg ferrous sulfate), 1 tablet, oral, Daily  heparin, 1,000 Units, intravenous, Once  heparin, 1,000 Units, intravenous, Once  insulin lispro, 0-10 Units, subcutaneous, q6h  insulin NPH (Isophane), 12 Units, subcutaneous, Daily  levothyroxine, 200 mcg, oral, Daily before breakfast  lidocaine, , ,   multivitamin with minerals, 1 tablet, oral, Daily  nystatin, 4 mL, Swish & Swallow, 4x daily  pantoprazole, 40 mg, oral, BID  perflutren lipid microspheres, 0.5-10 mL of dilution, intravenous, Once in imaging  perflutren protein A microsphere, 0.5 mL, intravenous, Once in imaging  [START ON 2/23/2024] predniSONE, 10 mg, oral, Once  [START ON 2/21/2024] predniSONE, 30 mg, oral, Daily   Followed by  [START ON 2/22/2024] predniSONE, 20 mg, oral, Daily  [Held by provider] rosuvastatin, 40 mg, oral, Nightly  sirolimus, 1 mg, oral, Daily  sulfur hexafluoride microsphr, 2 mL, intravenous, Once in imaging  tacrolimus, 1.5 mg, oral, Daily  tacrolimus, 1.5 mg, oral, Daily      Continuous medications  [Held by provider] heparin, 0-4,500 Units/hr, Last Rate: Stopped (02/19/24 1830)  lactated Ringer's, 5 mL/hr, Last Rate: 5 mL/hr (02/20/24 0700)  lactated Ringer's, 10 mL/hr, Last Rate: 10 mL/hr (02/20/24 0700)  milrinone, 0.25 mcg/kg/min, Last Rate: 0.25 mcg/kg/min (02/20/24 0700)  PrismaSol 4/2.5, 2,400 mL/hr, Last Rate: 2,400 mL/hr (02/19/24 6673)      PRN medications  PRN medications: acetaminophen, calcium gluconate, calcium gluconate, dextrose  10 % in water (D10W), dextrose, diphenhydrAMINE, glucagon, heparin, HYDROmorphone, lactulose, lidocaine, magnesium sulfate, magnesium sulfate, ondansetron, oxyCODONE, potassium chloride CR **OR** potassium chloride, potassium chloride, promethazine     Assessment/Plan    This is a 34 yo heart transplant recpient (3/2023) who presented on 2/15/24 with signs of acute cellular rejection on heart biopsy 2/16  and multiorgan dysfunction (new dialysis need (hypervolemia) as well as signs of hepatocellular dysfunction) in the setting of ADHF. Multiple pressors started on admission. IABP started on 2/18.  She was transferred to the [facility]ICU for [facility] management on 2/19. Palliative medicine was consulted for symptom management and concern that she may have imminent need for GOC discussion and goal realignment. Repeat heart biopsy, PLEX and CVVH planned for 2/20.    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 possible SLE cardiomyopathy)  #CHF with severely reduced EF s/p ICD  #Acute on chronic CHF requiring inotropic support  #Pulmonary HTN  #SLE  #Hypothyroidism  #Nausea  #Hemorrhoids    #Complex Medical Decision Making  #Goals of Care  #Advanced Care Planning  -code status: Full code  -surrogate decision maker: Fani Geller (Mother): [phone]  - goals are mix of survival and time and improved quality of life  - Advanced Directives on file     #Child Life Specialist indication  -Illness exacerbation and multiple young children at home  -Patients mother will break the news to her children and was already contacted by CLS on 2/20    #acute pain  - Primarily from prolonged bed rest and located in lumbar back.   -Discussed with primary team.   -Recommend changing oxycodone to PO hydromorphone 1mg Q4H and increasing IV hydromorphone from 0.2mg to 0.4mg Q3H PRN for severe   -Consider adding scheduled tylenol when liver injury resolves    #Diarrhea,   - likely iatrogenic in setting of  multiple agents given yesterday.   - No BM overnight or today.   - C/w lactulose PRN     #Insomnia:  -Related to frequent interventions, low back pain.  -Markedly improved with PRN dosing of dilaudid overnight.   -Pain control will likely have the most benefit.    #nausea  -Improved today  - Continue with Phenergan per patients preference. Zofran was not helpful on previous admissions.   - Consider periodic EKGs (will likely be done for other indications as matter of course)      #psychosocial support  -music therapy: will offer again when course stabilizes  -spiritual care support offered but currently not accepting for.       Advanced Care Planning  Patient and/or family consented to a voluntary Advanced Care Planning meeting.   Serious Illness Assessment and Counseling:  Life Limiting Disease: Severe exacerbation of systolic heart failure with multiorgan dysfunction posing threat to life or function.       Understanding/Overall Impression: Patient expressing clear understanding of overall health status and severity of illness.     Goals/Hopes: Discussion ensued about patient's goals for their medical care going forward. Allowed patient time to talk about his/her current quality of life, disease course/progression, and symptom and treatment burden. Discussed care plan to continue with aggressive hospital care despite symptom and treatment burden versus choosing to transition to comfort based plan of care that focuses on symptom management and quality of life.     Fears/Worries/Concerns:  Concern about rejection of transplanted heart. Frustrated by current situation.     Patient's Perception of Functional Status:   Wants to return to previous baseline.     Minimal Acceptable Quality of Life/Maximal [place] Tolerable for the Possibility of More Time:   Not dicussed in detail.     Health Preferences and Priorities with Disease Progression:   Patient wants to pursue all possible interventions in hopes of recovery.      Resuscitation Assessment:   Not discussed during previous meeting.     Advanced Directives:  Counseling provided on the importance of not crisis planning as disease burden progresses but to establish treatment limitations now so in the future medical team will be clear on what patient feels is an acceptable quality of life for the patient and what treatment limitations' patient would like set into place based on that.       Surrogate Health Care Decision Maker: Fani Geller  HPOA: Yes, on file or copies requested.   Living will: Yes, on file or copies requested.   Patient does not have a HPOA or living will. Counseling provided on benefit of completing advanced directives in order to establish person to make one's medical decision if patient were unable to speak for themselves and to make their health care wishes and treatment limitations to both hospital staff and their designated HPOA. Social work to help patient complete prior to discharge.     Code Status:   Decision to keep code status FULL CODE at this time.       I spent 60 minutes in providing separately identifiable ACP services with the patient and/or surrogate decision maker in a voluntary conversation discussing the patient's wishes and goals as detailed in the above note.   ----------------------------------------------------------------------------------------------------------------------------------------------------------------------------------------------------------------------------------------------------------------------------------------------------------------------------------------------------------------------      Plan of Care discussed with: Updated Estephania Enriquze NP prior to transfer to CTICU and Nader Alas NP in CTICU on 2/20 on goals of medication adjustments.     Medical Decision Making was high level due to high complexity of problems, extensive data review, and high risk of management/treatment.     -ADHF w/ resultant  multiorgan rejection in setting of mild rejection of transplanted heart posing threat to life and function  -reviews results from 2/16 heart biopsy w/ CD4+ lymphocyte infiltration  -recommended the following tests: CBC, CMP, showing signs of hepatocellular dysfunction  -discussion of management with primary team.  -parenteral controlled substances: recommended increasing hydromorphone IV for breaktrough pain.     Thank you for allowing us to participate in the care of this patient. Palliative will continue to follow as needed. Palliative medicine is available Monday-Friday, 8a-6p. Please contact team with any questions or concerns.  Team pager [phone] (weekdays)  Frank Valladares MD (on EPIC secure chat)    I saw and evaluated the patient. I personally obtained the key and critical portions of the history and physical exam or was physically present for key and critical portions performed by the resident/fellow. I reviewed the resident/fellow's documentation and discussed the patient with the resident/fellow. I agree with the resident/fellow's medical decision making as documented in the note.    Ira Chaudhary MD

## 2024-02-20 NOTE — PROGRESS NOTES
"Charla Bowles is a 33 y.o. female on day 5 of admission presenting with Cardiogenic shock (CMS/HCC).    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 She was admitted to HFICU on 2/15 with concern for cardiogenic shock secondary to allograft rejection.    Endomyocardial biopsy on 2/16 revealed mild ACR with +CD4s and negative HLAs, however multisystem organ failure persisted with increased inotropic requirements. IABP placed on 2/18, and peripheral VA-ECMO placed 2/19/24.      Objective     Physical Exam    Last Recorded Vitals  Blood pressure 124/80, pulse (!) 121, temperature 36 °C (96.8 °F), temperature source Core, resp. rate 16, height 1.549 m (5' 1\"), weight 103 kg (226 lb 13.7 oz), SpO2 100 %.  Intake/Output last 3 Shifts:  I/O last 3 completed shifts:  In: 4054 (39.4 mL/kg) [P.O.:1480; I.V.:774 (7.5 mL/kg); Blood:1050; IV Piggyback:750]  Out: 309 (3 mL/kg) [Urine:250 (0.1 mL/kg/hr); Emesis/NG output:15; Other:44]  Weight: 102.9 kg     Relevant Results  Results for orders placed or performed during the hospital encounter of 02/15/24 (from the past 24 hour(s))   POCT GLUCOSE   Result Value Ref Range    POCT Glucose 308 (H) 74 - 99 mg/dL   Blood Gas Arterial   Result Value Ref Range    POCT pH, Arterial 7.34 (L) 7.38 - 7.42 pH    POCT pCO2, Arterial 30 (L) 38 - 42 mm Hg    POCT pO2, Arterial 108 (H) 85 - 95 mm Hg    POCT SO2, Arterial 99 94 - 100 %    POCT Oxy Hemoglobin, Arterial 97.4 94.0 - 98.0 %    POCT Base Excess, Arterial -8.3 (L) -2.0 - 3.0 mmol/L    POCT HCO3 Calculated, Arterial 16.2 (L) 22.0 - 26.0 mmol/L    Patient Temperature 37.0 degrees Celsius    FiO2 20 %   Calcium, Ionized CRRT   Result Value Ref Range    POCT CRRT, Calcium Ionized 1.05 Reference range not established mmol/L   Blood Urea Nitrogen "   Result Value Ref Range    Urea Nitrogen 79 (H) 6 - 23 mg/dL   Creatinine   Result Value Ref Range    Creatinine 5.60 (H) 0.50 - 1.05 mg/dL    eGFR 10 (L) >60 mL/min/1.73m*2   BLOOD GAS MIXED VENOUS FULL PANEL   Result Value Ref Range    POCT pH, Mixed 7.30 (L) 7.33 - 7.43 pH    POCT pCO2, Mixed 38 (L) 41 - 51 mm Hg    POCT pO2, Mixed 37 35 - 45 mm Hg    POCT SO2, Mixed 51 45 - 75 %    POCT Oxy Hemoglobin, Mixed 50.3 45.0 - 75.0 %    POCT Hematocrit Calculated, Mixed 28.0 (L) 36.0 - 46.0 %    POCT Sodium, Mixed 122 (L) 136 - 145 mmol/L    POCT Potassium, Mixed 4.2 3.5 - 5.3 mmol/L    POCT Chloride, Mixed 92 (L) 98 - 107 mmol/L    POCT Ionized Calcium, Mixed 0.97 (L) 1.10 - 1.33 mmol/L    POCT Glucose, Mixed 296 (H) 74 - 99 mg/dL    POCT Lactate, Mixed 2.7 (H) 0.4 - 2.0 mmol/L    POCT Base Excess, Mixed -7.1 (L) -2.0 - 3.0 mmol/L    POCT HCO3 Calculated, Mixed 18.7 (L) 22.0 - 26.0 mmol/L    POCT Hemoglobin, Mixed 9.2 (L) 12.0 - 16.0 g/dL    POCT Anion Gap, Mixed 16 10 - 25 mmo/L    Patient Temperature 37.0 degrees Celsius    FiO2 20 %   BLOOD GAS MIXED VENOUS FULL PANEL   Result Value Ref Range    POCT pH, Mixed 7.30 (L) 7.33 - 7.43 pH    POCT pCO2, Mixed 37 (L) 41 - 51 mm Hg    POCT pO2, Mixed 40 35 - 45 mm Hg    POCT SO2, Mixed 59 45 - 75 %    POCT Oxy Hemoglobin, Mixed 58.3 45.0 - 75.0 %    POCT Hematocrit Calculated, Mixed 24.0 (L) 36.0 - 46.0 %    POCT Sodium, Mixed 125 (L) 136 - 145 mmol/L    POCT Potassium, Mixed 4.0 3.5 - 5.3 mmol/L    POCT Chloride, Mixed 93 (L) 98 - 107 mmol/L    POCT Ionized Calcium, Mixed 1.11 1.10 - 1.33 mmol/L    POCT Glucose, Mixed 271 (H) 74 - 99 mg/dL    POCT Lactate, Mixed 2.7 (H) 0.4 - 2.0 mmol/L    POCT Base Excess, Mixed -7.6 (L) -2.0 - 3.0 mmol/L    POCT HCO3 Calculated, Mixed 18.2 (L) 22.0 - 26.0 mmol/L    POCT Hemoglobin, Mixed 8.1 (L) 12.0 - 16.0 g/dL    POCT Anion Gap, Mixed 18 10 - 25 mmo/L    Patient Temperature 37.0 degrees Celsius    FiO2 20 %   BLOOD GAS ARTERIAL FULL  PANEL   Result Value Ref Range    POCT pH, Arterial 7.36 (L) 7.38 - 7.42 pH    POCT pCO2, Arterial 30 (L) 38 - 42 mm Hg    POCT pO2, Arterial 115 (H) 85 - 95 mm Hg    POCT SO2, Arterial 98 94 - 100 %    POCT Oxy Hemoglobin, Arterial 96.8 94.0 - 98.0 %    POCT Hematocrit Calculated, Arterial 25.0 (L) 36.0 - 46.0 %    POCT Sodium, Arterial 124 (L) 136 - 145 mmol/L    POCT Potassium, Arterial 4.2 3.5 - 5.3 mmol/L    POCT Chloride, Arterial 94 (L) 98 - 107 mmol/L    POCT Ionized Calcium, Arterial 1.10 1.10 - 1.33 mmol/L    POCT Glucose, Arterial 271 (H) 74 - 99 mg/dL    POCT Lactate, Arterial 2.9 (H) 0.4 - 2.0 mmol/L    POCT Base Excess, Arterial -7.7 (L) -2.0 - 3.0 mmol/L    POCT HCO3 Calculated, Arterial 16.9 (L) 22.0 - 26.0 mmol/L    POCT Hemoglobin, Arterial 8.2 (L) 12.0 - 16.0 g/dL    POCT Anion Gap, Arterial 17 10 - 25 mmo/L    Patient Temperature 37.0 degrees Celsius    FiO2 20 %   Lactate   Result Value Ref Range    Lactate 2.6 (H) 0.4 - 2.0 mmol/L   Heparin Assay, UFH   Result Value Ref Range    Heparin Unfractionated 1.0 See Comment Below for Therapeutic Ranges IU/mL   POCT GLUCOSE   Result Value Ref Range    POCT Glucose 226 (H) 74 - 99 mg/dL   Prepare RBC: 4 Units   Result Value Ref Range    PRODUCT CODE F0383R69     Unit Number [id number]     Unit ABO O     Unit RH POS     XM INTEP COMP     Dispense Status TR     Blood Expiration Date March 11, 2024 23:59 EDT     PRODUCT BLOOD TYPE 5100     UNIT VOLUME 350     PRODUCT CODE V7396H85     Unit Number [id number]     Unit ABO O     Unit RH POS     XM INTEP COMP     Dispense Status TR     Blood Expiration Date March 11, 2024 23:59 EDT     PRODUCT BLOOD TYPE 5100     UNIT VOLUME 350     PRODUCT CODE [id number]     Unit Number [id number]     Unit ABO O     Unit RH POS     XM INTEP COMP     Dispense Status XM     Blood Expiration Date March 12, 2024 23:59 EDT     PRODUCT BLOOD TYPE 5100     UNIT VOLUME 279     PRODUCT CODE [id number]     Unit Number  [id number]     Unit ABO O     Unit RH POS     XM INTEP COMP     Dispense Status TR     Blood Expiration Date March 12, 2024 23:59 EDT     PRODUCT BLOOD TYPE 5100     UNIT VOLUME 319    Calcium, ionized   Result Value Ref Range    POCT Calcium, Ionized 1.05 (L) 1.1 - 1.33 mmol/L   Renal Function Panel   Result Value Ref Range    Glucose 182 (H) 74 - 99 mg/dL    Sodium 126 (L) 136 - 145 mmol/L    Potassium 4.0 3.5 - 5.3 mmol/L    Chloride 92 (L) 98 - 107 mmol/L    Bicarbonate 17 (L) 21 - 32 mmol/L    Anion Gap 21 (H) 10 - 20 mmol/L    Urea Nitrogen 69 (H) 6 - 23 mg/dL    Creatinine 4.82 (H) 0.50 - 1.05 mg/dL    eGFR 12 (L) >60 mL/min/1.73m*2    Calcium 8.3 (L) 8.6 - 10.6 mg/dL    Phosphorus 6.0 (H) 2.5 - 4.9 mg/dL    Albumin 3.6 3.4 - 5.0 g/dL   Blood Gas Arterial Full Panel Unsolicited   Result Value Ref Range    POCT pH, Arterial 7.41 7.38 - 7.42 pH    POCT pCO2, Arterial 27 (L) 38 - 42 mm Hg    POCT pO2, Arterial 107 (H) 85 - 95 mm Hg    POCT SO2, Arterial 99 94 - 100 %    POCT Oxy Hemoglobin, Arterial 97.2 94.0 - 98.0 %    POCT Hematocrit Calculated, Arterial 25.0 (L) 36.0 - 46.0 %    POCT Sodium, Arterial 124 (L) 136 - 145 mmol/L    POCT Potassium, Arterial 4.4 3.5 - 5.3 mmol/L    POCT Chloride, Arterial 95 (L) 98 - 107 mmol/L    POCT Ionized Calcium, Arterial 1.09 (L) 1.10 - 1.33 mmol/L    POCT Glucose, Arterial 200 (H) 74 - 99 mg/dL    POCT Lactate, Arterial 2.7 (H) 0.4 - 2.0 mmol/L    POCT Base Excess, Arterial -6.6 (L) -2.0 - 3.0 mmol/L    POCT HCO3 Calculated, Arterial 17.1 (L) 22.0 - 26.0 mmol/L    POCT Hemoglobin, Arterial 8.3 (L) 12.0 - 16.0 g/dL    POCT Anion Gap, Arterial 16 10 - 25 mmo/L    Patient Temperature 37.0 degrees Celsius   Magnesium   Result Value Ref Range    Magnesium 2.91 (H) 1.60 - 2.40 mg/dL   Coagulation Screen   Result Value Ref Range    Protime 15.4 (H) 9.8 - 12.8 seconds    INR 1.4 (H) 0.9 - 1.1    aPTT 135 (HH) 27 - 38 seconds   Fibrinogen   Result Value Ref Range    Fibrinogen  166 (L) 200 - 400 mg/dL   CBC   Result Value Ref Range    WBC 8.3 4.4 - 11.3 x10*3/uL    nRBC 13.4 (H) 0.0 - 0.0 /100 WBCs    RBC 2.97 (L) 4.00 - 5.20 x10*6/uL    Hemoglobin 8.0 (L) 12.0 - 16.0 g/dL    Hematocrit 24.3 (L) 36.0 - 46.0 %    MCV 82 80 - 100 fL    MCH 26.9 26.0 - 34.0 pg    MCHC 32.9 32.0 - 36.0 g/dL    RDW 15.2 (H) 11.5 - 14.5 %    Platelets 131 (L) 150 - 450 x10*3/uL   Renal Function Panel   Result Value Ref Range    Glucose 181 (H) 74 - 99 mg/dL    Sodium 126 (L) 136 - 145 mmol/L    Potassium 4.0 3.5 - 5.3 mmol/L    Chloride 92 (L) 98 - 107 mmol/L    Bicarbonate 17 (L) 21 - 32 mmol/L    Anion Gap 21 (H) 10 - 20 mmol/L    Urea Nitrogen 69 (H) 6 - 23 mg/dL    Creatinine 4.91 (H) 0.50 - 1.05 mg/dL    eGFR 11 (L) >60 mL/min/1.73m*2    Calcium 8.3 (L) 8.6 - 10.6 mg/dL    Phosphorus 5.8 (H) 2.5 - 4.9 mg/dL    Albumin 3.5 3.4 - 5.0 g/dL   Blood Gas Arterial Full Panel Unsolicited   Result Value Ref Range    POCT pH, Arterial 7.40 7.38 - 7.42 pH    POCT pCO2, Arterial 27 (L) 38 - 42 mm Hg    POCT pO2, Arterial 477 (H) 85 - 95 mm Hg    POCT SO2, Arterial 100 94 - 100 %    POCT Oxy Hemoglobin, Arterial 98.3 (H) 94.0 - 98.0 %    POCT Hematocrit Calculated, Arterial 22.0 (L) 36.0 - 46.0 %    POCT Sodium, Arterial 124 (L) 136 - 145 mmol/L    POCT Potassium, Arterial 3.9 3.5 - 5.3 mmol/L    POCT Chloride, Arterial 95 (L) 98 - 107 mmol/L    POCT Ionized Calcium, Arterial 1.00 (L) 1.10 - 1.33 mmol/L    POCT Glucose, Arterial 191 (H) 74 - 99 mg/dL    POCT Lactate, Arterial 2.2 (H) 0.4 - 2.0 mmol/L    POCT Base Excess, Arterial -7.3 (L) -2.0 - 3.0 mmol/L    POCT HCO3 Calculated, Arterial 16.7 (L) 22.0 - 26.0 mmol/L    POCT Hemoglobin, Arterial 7.4 (L) 12.0 - 16.0 g/dL    POCT Anion Gap, Arterial 16 10 - 25 mmo/L    Patient Temperature 37.0 degrees Celsius    FiO2 100 %   Coox Panel, Arterial Unsolicited   Result Value Ref Range    POCT Hemoglobin, Arterial 7.4 (L) 12.0 - 16.0 g/dL    POCT Oxy Hemoglobin, Arterial 98.3  (H) 94.0 - 98.0 %    POCT Carboxyhemoglobin, Arterial 1.0 %    POCT Methemoglobin, Arterial 0.6 0.0 - 1.5 %    POCT Deoxy Hemoglobin, Arterial 0.0 0.0 - 5.0 %   Blood Gas Arterial Full Panel   Result Value Ref Range    POCT pH, Arterial 7.41 7.38 - 7.42 pH    POCT pCO2, Arterial 27 (L) 38 - 42 mm Hg    POCT pO2, Arterial 149 (H) 85 - 95 mm Hg    POCT SO2, Arterial 100 94 - 100 %    POCT Oxy Hemoglobin, Arterial 97.9 94.0 - 98.0 %    POCT Hematocrit Calculated, Arterial 25.0 (L) 36.0 - 46.0 %    POCT Sodium, Arterial 124 (L) 136 - 145 mmol/L    POCT Potassium, Arterial 4.2 3.5 - 5.3 mmol/L    POCT Chloride, Arterial 95 (L) 98 - 107 mmol/L    POCT Ionized Calcium, Arterial 1.01 (L) 1.10 - 1.33 mmol/L    POCT Glucose, Arterial 192 (H) 74 - 99 mg/dL    POCT Lactate, Arterial 1.6 0.4 - 2.0 mmol/L    POCT Base Excess, Arterial -6.6 (L) -2.0 - 3.0 mmol/L    POCT HCO3 Calculated, Arterial 17.1 (L) 22.0 - 26.0 mmol/L    POCT Hemoglobin, Arterial 8.4 (L) 12.0 - 16.0 g/dL    POCT Anion Gap, Arterial 16 10 - 25 mmo/L    Patient Temperature 37.0 degrees Celsius    FiO2 100 %   POCT GLUCOSE   Result Value Ref Range    POCT Glucose 183 (H) 74 - 99 mg/dL   Blood Gas Arterial Full Panel   Result Value Ref Range    POCT pH, Arterial 7.41 7.38 - 7.42 pH    POCT pCO2, Arterial 28 (L) 38 - 42 mm Hg    POCT pO2, Arterial 117 (H) 85 - 95 mm Hg    POCT SO2, Arterial 100 94 - 100 %    POCT Oxy Hemoglobin, Arterial 97.4 94.0 - 98.0 %    POCT Hematocrit Calculated, Arterial 24.0 (L) 36.0 - 46.0 %    POCT Sodium, Arterial 123 (L) 136 - 145 mmol/L    POCT Potassium, Arterial 4.4 3.5 - 5.3 mmol/L    POCT Chloride, Arterial 94 (L) 98 - 107 mmol/L    POCT Ionized Calcium, Arterial 1.04 (L) 1.10 - 1.33 mmol/L    POCT Glucose, Arterial 186 (H) 74 - 99 mg/dL    POCT Lactate, Arterial 1.2 0.4 - 2.0 mmol/L    POCT Base Excess, Arterial -6.1 (L) -2.0 - 3.0 mmol/L    POCT HCO3 Calculated, Arterial 17.7 (L) 22.0 - 26.0 mmol/L    POCT Hemoglobin, Arterial  8.0 (L) 12.0 - 16.0 g/dL    POCT Anion Gap, Arterial 16 10 - 25 mmo/L    Patient Temperature 37.0 degrees Celsius    FiO2 21 %   Magnesium   Result Value Ref Range    Magnesium 2.73 (H) 1.60 - 2.40 mg/dL   Hepatic function panel   Result Value Ref Range    Albumin 2.8 (L) 3.4 - 5.0 g/dL    Bilirubin, Total 1.0 0.0 - 1.2 mg/dL    Bilirubin, Direct 0.6 (H) 0.0 - 0.3 mg/dL    Alkaline Phosphatase 57 33 - 110 U/L     (H) 7 - 45 U/L     (H) 9 - 39 U/L    Total Protein 6.2 (L) 6.4 - 8.2 g/dL   Coagulation Screen   Result Value Ref Range    Protime 15.5 (H) 9.8 - 12.8 seconds    INR 1.4 (H) 0.9 - 1.1    aPTT 62 (H) 27 - 38 seconds   Lactate Dehydrogenase   Result Value Ref Range     (H) 84 - 246 U/L   Phosphorus   Result Value Ref Range    Phosphorus 5.9 (H) 2.5 - 4.9 mg/dL   Basic Metabolic Panel   Result Value Ref Range    Glucose 185 (H) 74 - 99 mg/dL    Sodium 123 (L) 136 - 145 mmol/L    Potassium 4.4 3.5 - 5.3 mmol/L    Chloride 91 (L) 98 - 107 mmol/L    Bicarbonate 18 (L) 21 - 32 mmol/L    Anion Gap 18 10 - 20 mmol/L    Urea Nitrogen 75 (H) 6 - 23 mg/dL    Creatinine 5.26 (H) 0.50 - 1.05 mg/dL    eGFR 10 (L) >60 mL/min/1.73m*2    Calcium 7.5 (L) 8.6 - 10.6 mg/dL   Heparin Assay, UFH   Result Value Ref Range    Heparin Unfractionated 0.8 See Comment Below for Therapeutic Ranges IU/mL   Calcium, Ionized   Result Value Ref Range    POCT Calcium, Ionized 1.01 (L) 1.1 - 1.33 mmol/L   CBC   Result Value Ref Range    WBC 6.6 4.4 - 11.3 x10*3/uL    nRBC 10.5 (H) 0.0 - 0.0 /100 WBCs    RBC 2.53 (L) 4.00 - 5.20 x10*6/uL    Hemoglobin 7.1 (L) 12.0 - 16.0 g/dL    Hematocrit 21.0 (L) 36.0 - 46.0 %    MCV 83 80 - 100 fL    MCH 28.1 26.0 - 34.0 pg    MCHC 33.8 32.0 - 36.0 g/dL    RDW 15.0 (H) 11.5 - 14.5 %    Platelets 101 (L) 150 - 450 x10*3/uL   Prepare RBC: 1 Units   Result Value Ref Range    PRODUCT CODE [id number]     Unit Number [id number]     Unit ABO O     Unit RH POS     XM INTEP COMP     Dispense  Status XM     Blood Expiration Date February 28, 2024 23:59 EST     PRODUCT BLOOD TYPE 5100     UNIT VOLUME 333    TEG Clot Lysis Profile   Result Value Ref Range    R (Reaction Time) K >17.0 (H) 4.6 - 9.1 min    MA (Max Amplitude) RT 53.2 52.0 - 70.0 mm    MA ( Fran Amplitude) FF 15.0 15.0 - 32.0 mm   POCT GLUCOSE   Result Value Ref Range    POCT Glucose 173 (H) 74 - 99 mg/dL   CBC   Result Value Ref Range    WBC 6.2 4.4 - 11.3 x10*3/uL    nRBC 11.7 (H) 0.0 - 0.0 /100 WBCs    RBC 2.68 (L) 4.00 - 5.20 x10*6/uL    Hemoglobin 7.7 (L) 12.0 - 16.0 g/dL    Hematocrit 22.5 (L) 36.0 - 46.0 %    MCV 84 80 - 100 fL    MCH 28.7 26.0 - 34.0 pg    MCHC 34.2 32.0 - 36.0 g/dL    RDW 14.9 (H) 11.5 - 14.5 %    Platelets 95 (L) 150 - 450 x10*3/uL   Blood Gas Arterial Full Panel   Result Value Ref Range    POCT pH, Arterial 7.41 7.38 - 7.42 pH    POCT pCO2, Arterial 25 (L) 38 - 42 mm Hg    POCT pO2, Arterial 205 (H) 85 - 95 mm Hg    POCT SO2, Arterial 100 94 - 100 %    POCT Oxy Hemoglobin, Arterial 97.9 94.0 - 98.0 %    POCT Hematocrit Calculated, Arterial 24.0 (L) 36.0 - 46.0 %    POCT Sodium, Arterial 119 (LL) 136 - 145 mmol/L    POCT Potassium, Arterial 4.8 3.5 - 5.3 mmol/L    POCT Chloride, Arterial 92 (L) 98 - 107 mmol/L    POCT Ionized Calcium, Arterial 1.04 (L) 1.10 - 1.33 mmol/L    POCT Glucose, Arterial 197 (H) 74 - 99 mg/dL    POCT Lactate, Arterial 1.1 0.4 - 2.0 mmol/L    POCT Base Excess, Arterial -7.8 (L) -2.0 - 3.0 mmol/L    POCT HCO3 Calculated, Arterial 15.8 (L) 22.0 - 26.0 mmol/L    POCT Hemoglobin, Arterial 8.1 (L) 12.0 - 16.0 g/dL    POCT Anion Gap, Arterial 16 10 - 25 mmo/L    Patient Temperature 37.0 degrees Celsius    FiO2 21 %   Tacrolimus level   Result Value Ref Range    Tacrolimus  7.5 <=15.0 ng/mL   POCT GLUCOSE   Result Value Ref Range    POCT Glucose 208 (H) 74 - 99 mg/dL   Blood Gas Arterial Full Panel   Result Value Ref Range    POCT pH, Arterial 7.36 (L) 7.38 - 7.42 pH    POCT pCO2, Arterial 28 (L) 38 -  42 mm Hg    POCT pO2, Arterial 200 (H) 85 - 95 mm Hg    POCT SO2, Arterial 100 94 - 100 %    POCT Oxy Hemoglobin, Arterial 97.9 94.0 - 98.0 %    POCT Hematocrit Calculated, Arterial 24.0 (L) 36.0 - 46.0 %    POCT Sodium, Arterial 119 (LL) 136 - 145 mmol/L    POCT Potassium, Arterial 5.0 3.5 - 5.3 mmol/L    POCT Chloride, Arterial 92 (L) 98 - 107 mmol/L    POCT Ionized Calcium, Arterial 1.03 (L) 1.10 - 1.33 mmol/L    POCT Glucose, Arterial 195 (H) 74 - 99 mg/dL    POCT Lactate, Arterial 1.0 0.4 - 2.0 mmol/L    POCT Base Excess, Arterial -8.7 (L) -2.0 - 3.0 mmol/L    POCT HCO3 Calculated, Arterial 15.8 (L) 22.0 - 26.0 mmol/L    POCT Hemoglobin, Arterial 8.1 (L) 12.0 - 16.0 g/dL    POCT Anion Gap, Arterial 16 10 - 25 mmo/L    Patient Temperature 37.0 degrees Celsius    FiO2 21 %        Assessment/Plan   Principal Problem:    Cardiogenic shock (CMS/[facility])  Active Problems:    Heart transplant recipient (CMS/[facility])    Encounter for aftercare following heart transplant (CMS/[facility])    Heart transplant as cause of abnormal reaction or later complication (CMS/[facility])    32 y/o female s/p OHTx, now with rejection and resultant cardiogenic shock with multi-organ dysfunction escalated to IABP and VA-ECMO.    1) CS   Well supported with VA-ECMO, no issues with ECMO. Lactate has cleared. Can wean off inotropes. IABP will assist with LV unloading. Distal pulses stable. Renal replacement therapy to be re-initiated.  2) ACR, Hx of OHTx   Steroids/Thymo(done), re biopsy today and plasmapheresis. Tacro/Siro. All discussed and reviewed with heart transplant team on rounds this morning.  3) Acute Renal failure   Nephrology consulted. CRRT  4) Acute blood loss anemia   Oozing overnight, improved this morning. Holding heparin for now given oozing and biopsy.  5) Thrombocytopenia  6) Disposition   CTICU for ongoing ECMO management/monitoring.    Due to the high probability of life threatening clinical decompensation, the patient required critical  care time evaluating and managing this patient.  Critical care time included obtaining a history, examining the patient, ordering and reviewing studies, discussing, developing, and implementing a management plan, evaluating the patient's response to treatment, and discussion with other care team providers. I saw and evaluated the patient myself. I reviewed the NILA's documentation and discussed the patient with the provider. Critical care time was performed exclusive of billable procedures.    Critical Care Time: 40 minutes     Quang Mehta MD

## 2024-02-21 NOTE — PROCEDURES
Intubation    Date/Time: 2/21/2024 2:16 AM    Performed by: Maryjane Ponce MD  Authorized by: Feliciano Adam MD    Consent:     Consent obtained:  Emergent situation  Universal protocol:     Procedure explained and questions answered to patient or proxy's satisfaction: yes      Relevant documents present and verified: yes      Test results available: yes      Imaging studies available: yes      Required blood products, implants, devices, and special equipment available: yes      Site/side marked: yes      Immediately prior to procedure, a time out was called: yes    Pre-procedure details:     Indications: airway protection, respiratory distress and respiratory failure      Indications comment:  Worsening interstitial edema on CXR    Patient status:  Altered mental status    Look externally: no concerns      Mallampati score: unable to assess.    Obstruction comment:  Frothy secretions    Neck mobility: normal      Pharmacologic strategy: RSI      Induction agents:  Etomidate    Paralytics:  Succinylcholine  Procedure details:     Preoxygenation:  Bag valve mask    CPR in progress: no      Number of attempts:  2  Successful intubation attempt details:     Intubation method:  Oral    Intubation technique: video assisted      Laryngoscope blade:  Mac 4    Bougie used: no      Grade view: II      Tube size (mm):  8.0    Tube type:  Cuffed    Tube visualized through cords: yes    First unsuccessful intubation attempt details:     Intubation method:  Oral    Intubation technique:  Video assisted    Laryngoscope blade:  Mac 4    Bougie used: no      Grade view: IV    Placement assessment:     ETT at teeth/gumline (cm):  23    Tube secured with:  ETT torrez    Breath sounds:  Equal    Placement verification: chest rise and colorimetric ETCO2    Post-procedure details:     Procedure completion:  Tolerated
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
## 2024-02-21 NOTE — PROGRESS NOTES
CTICU Progress Note  Charla Bowles/[id number]    Admit Date: 2/15/2024  Hospital Length of Stay: 6   ICU Length of Stay: 1d 15h   CT SURGEON:     SUBJECTIVE:   1 pRBC  Heparin started  Intubated, worsening pulm edema    MEDICATIONS  Infusions:  dextrose 5 % in water (D5W), Last Rate: 100 mL/hr (02/21/24 0700)  heparin, Last Rate: 1,400 Units/hr (02/21/24 0700)  lactated Ringer's, Last Rate: 5 mL/hr (02/21/24 0700)  lactated Ringer's, Last Rate: Stopped (02/20/24 1715)  milrinone, Last Rate: 0.125 mcg/kg/min (02/21/24 0700)  nitroglycerin  norepinephrine  PrismaSol 4/2.5, Last Rate: 2,400 mL/hr (02/20/24 2214)  propofol, Last Rate: 40 mcg/kg/min (02/21/24 0700)      Scheduled:  acetaminophen, 650 mg, q6h  albumin human, 25 g, Once  [Held by provider] aspirin, 81 mg, Daily  calcitriol, 0.5 mcg, Daily  calcium carbonate, 1,250 mg, BID with meals  ferrous sulfate (325 mg ferrous sulfate), 1 tablet, Daily  heparin, 1,000 Units, Once  heparin, 1,000 Units, Once  insulin lispro, 0-10 Units, TID with meals  insulin NPH (Isophane), 12 Units, Daily  levothyroxine, 100 mcg, q24h TRICIA  multivitamin with minerals, 1 tablet, Daily  nystatin, 5 mL, q6h  pantoprazole, 40 mg, BID  perflutren lipid microspheres, 0.5-10 mL of dilution, Once in imaging  perflutren protein A microsphere, 0.5 mL, Once in imaging  [START ON 2/23/2024] predniSONE, 10 mg, Once  predniSONE, 30 mg, Daily   Followed by  [START ON 2/22/2024] predniSONE, 20 mg, Daily  rocuronium, ,   [Held by provider] rosuvastatin, 40 mg, Nightly  sirolimus, 1 mg, Daily  sulfamethoxazole-trimethoprim, 80 mg, Daily  sulfur hexafluoride microsphr, 2 mL, Once in imaging  tacrolimus, 1.5 mg, Daily  tacrolimus, 1.5 mg, Daily  valGANciclovir, 450 mg, q48h      PRN:  acetaminophen, 650 mg, q6h PRN  calcium gluconate, 1 g, q6h PRN  calcium gluconate, 2 g, q6h PRN  dextrose 10 % in water (D10W), 0.3 g/kg/hr, Once PRN  dextrose, 25 g, q15 min PRN  diphenhydrAMINE, 25 mg, q6h  "PRN  glucagon, 1 mg, q15 min PRN  heparin, 3,000-6,000 Units, q4h PRN  HYDROmorphone, 0.4 mg, q3h PRN  HYDROmorphone, 1 mg, q4h PRN  lactulose, 20 g, Daily PRN  magnesium sulfate, 1 g, q6h PRN  magnesium sulfate, 2 g, q6h PRN  oxygen, , Continuous PRN - O2/gases  potassium chloride CR, 20 mEq, q6h PRN   Or  potassium chloride, 20 mEq, q6h PRN  potassium chloride, 40 mEq, q6h PRN  promethazine, 12.5 mg, q6h PRN  rocuronium, ,         PHYSICAL EXAM:   Visit Vitals  /57   Pulse 98   Temp 36.9 °C (98.4 °F) (Core)   Resp 16   Ht 1.549 m (5' 1\")   Wt 99.6 kg (219 lb 9.3 oz)   SpO2 100%   BMI 41.49 kg/m²   OB Status Hysterectomy   Smoking Status Never   BSA 2.07 m²     Wt Readings from Last 5 Encounters:   02/21/24 99.6 kg (219 lb 9.3 oz)   12/07/23 92.1 kg (203 lb)   12/01/23 93 kg (205 lb)   11/29/23 92.9 kg (204 lb 12.8 oz)   11/09/23 91.3 kg (201 lb 3.2 oz)     INTAKE/OUTPUT:  I/O last 3 completed shifts:  In: 9182.6 (92.2 mL/kg) [P.O.:1160; I.V.:2637.6 (26.5 mL/kg); Blood:1050; Other:3885; IV Piggyback:450]  Out: 5345 (53.7 mL/kg) [Other:5340; Blood:5]  Weight: 99.6 kg          Vent settings:  Vent Mode: Assist control/Volume control plus  S RR:  [16] 16  S VT:  [380 mL] 380 mL  PEEP/CPAP (cm H2O):  [10 cm H20-12 cm H20] 12 cm H20  MAP (cm H2O):  [16] 16    Physical Exam:   - CONSTITUTION: critically ill, young appearing female on ECMO  - NEUROLOGIC: following commands.  Moving all extremities with no focal deficits  - CARDIOVASCULAR: NSR with PVCs  - RESPIRATORY: intubated.  Frothy secretions  - GI: no wheatley  - : obese, soft.  Active BS.   - EXTREMITIES: warm edematous.  Well perfused.  Right fem IABP, some bleeding at site.  Left fem VA ECMO, some bleeding at site  - SKIN: Intact  - PSYCHIATRIC: JOSE    Daily Risk Screen  Intubated: required- inadequate oxygenation and hemodynamic instability  Central line: required- HD, hemodynamic monitoring, parenteral medications  Wheatley: n/a    Images:     Invasive " Hemodynamics:    Most Recent Range Past 24hrs   BP (Art) 112/61 Arterial Line BP 1  Min: 88/51  Max: 144/67   MAP(Art) 80 mmHg Arterial Line MAP 1 (mmHg)   Min: 65 mmHg  Max: 98 mmHg   RA/CVP   No data recorded   PA 19/16 PAP  Min: 17/14  Max: 38/26   PA(mean) 18 mmHg PAP (Mean)  Min: 14 mmHg  Max: 31 mmHg   CO 5.7 L/min No data recorded   CI 2.9 L/min/m2 No data recorded   Mixed Venous 66.1 % SVO2 (%)  Min: 41.8 %  Max: 76.7 %   SVR  758 (dyne*sec)/cm5 No data recorded         Assessment/Plan   This is a 33 year old female with past medical history of heart transplant in March 2022 with postoperative course complicated by upper extremity/internal jugular DVTs, and 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 She was admitted to HFICU on 2/15 with concern for cardiogenic shock secondary to allograft rejection and decompensated heart failure with multiorgan dysfunction including significant elevation of liver enzymes and nonoliguric acute kidney injury. Endomyocardial biopsy on 2/16 revealed mild ACR with +CD4s and negative HLAs, however multisystem organ failure persisted with increased inotropic requirements. IABP placed on 2/18. Transferred to CTICU on 2/19 for VA ECMO cannulation with Dr. Marina for persistent multi-organ system dysfunction.     Plan:  NEURO: No history. Patient is s/p ECMO cannulation. Neuro intact and CAM negative.  No intubated and sedated on propofol. -->  - Serial neuro and pain assessments  - PRN Tylenol 650mg q6hr (mild)  - continue PO dilaudid 1mg for moderate pain and IV dilaudid 0.4mg for severe pain  - minimize propofol as able  - PT/OT Consult  - CAM ICU score qshift  - Sleep/wake cycle hygiene     CV: Patient has a history of heart transplant in March of 2022 with TTE on 11/29 with LVEF 60-65%. Admitted for cardiogenic shock with concern for acute cellular rejection s/p IABP placement (R fem) 2/18 and VA ECMO (left fem) 2/19. ECHO 2/20 with persisting severe BiV dysfunction despite  negative biopsy 2/20.  Remains on ECMO 3L flow/100%FiO2 /sweep 5.  IABP 1:1. Worsening acute pulmonary edema with poor ejection on milrinone 0.25 mcg/kg/min.  Hypotensive now on levo 0.02 mcg/kg/min.  End organ perfusion stable. Previously with severe tachycardia, now NSR with PVCs in 80-90s.  -->  - Maintain goal MAP 70-90  - Maintain IABP 1:1.   - continue full ECMO support while optimizing volume status  - transition milrinone to epi to allow for improved blood pressure to pull volume and increase LV venting  - F/U final echo read from 2/20  - Hold daily rosuvastatin 40mg, will resume if LFT normalize  - Hold home amlodipine     PULM: No history of pulmonary disease. Acute respiratory failure due to tachypnea and acute pulmonary edema on CXR with frothy secretion.  Intubated overnight due to respiratory distress despite reasonable ABG due to ECMO.  Appropriate plateau pressures. Currently on ACVC 16/380/12+/40%. Persisting frothy secretions  -->  - Daily CXR  - Continuous pulse oximetry  - Maintain SPO2 > 92%  - wean sweep for normal pH  - leave PEEP with pulmonary edema  - monitor plateau pressures, wean to 6cc/kg     GI: History of gastric bypass and MMF colitis. Last BM 2/19. Previously on oral diet.  -->  - Continue daily PPI   - start TF  - Continue calcitriol 0.5mg daily  - Continue multivitamin  - Continue Calcium carbonate 1,250mg BID  - place dobhoff and start TF  - start miralax daily with PRN lactulose. Will increase regimen if needed     : No history of renal disease, baseline creatinine 1.2-1.3. Oliguric HIRAM likely in setting of cardiorenal physiology. Renal US from 2/19 unremarkable. No response to lasix drip. Hypervolemic and hyponatremic. On CVVH with Na stable at 126 last 12 hrs on D5@ at 150ml/hr.  -->  - Bladder scan daily  - continue CVVH for goal negative  - continue D5W to prevent overcorrection of Na, decrease to 125ml/hr to slowly allow Na to uptrend to normal.    - RFP BID  - Serum  sodium q4hr  - Replete electrolytes per CTICU protocol  - Nephrology following, appreciate recs     ENDO: History of hypothyroidism TSH 12.02, free thyroxine 2.19. A1c: 6.3. Borderline hyperglycemic.  -->  - Maintain BG <180 with hypoglycemia protocol  - NPH 12u daily while receiving prednisone taper  - SSI #2 q6hr while NPO  - If glucose > 300 start insulin gtt per CTICU protocol  - Continue synthroid 200mcg daily  - Endocrine consulted, appreciate recs     HEME: History of DVT. Acute on chronic iron deficiency anemia. Acute blood loss anemia with heparin assay 1 overnight.  Increased bleeding from ECMO cannulas. 1 pRBC and incremented overnight.  Downtrending thrombocytopenia. -->    - CBC BID  - Hold systemic heparin with bleeding  - send PF4  - hold ASA with thrombocytopenia  - Continue daily ferrous sulfate  - SCDs for DVT prophylaxis  - Last type and screen: 2/20     Rejection/prophylaxis: S/p heart transplant 3/31/2022. Induction basiliximab. Donor HCV -, toxo -/-, CMV -/+. Mild ACR with +CD4 and negative HLAs however clinical presentation concern for AMR rejection.  PLEX/IVIG 2/17, 2/20. Thymo 2/18, 2/19. Repeat biopsy 2/20 with no evidence of on going rejection (ACR 0R, pAMR0 and no C3D or C4D)  - Continue scheduled steroid taper.   - Continue tacrolimus 1.5mg BID with goal FK level 3-5  - Continue sirolimus 1mg daily with goal level 7-9  - Plan for no further PLEX/IVIG or thymo  - continue Nystatin suspension 500,000 units q6hr  - continue SS bactrim daily  - continue Valcyte 450mg q48hr     ID: Afebrile, no current indications of infection. Received Zosyn and Vancomycin on 2/15 in ED. Blood cultures from 2/15 negative. -->  - Trend temp q4h     Skin: No active skin issues.  - Preventative Mepilex dressings in place on sacrum and heels  - Change preventative Mepilex weekly or more frequently as indicated (when moist/soiled)   - Every shift skin assessment per nursing and weekly ICU skin rounds  - Moisture  barrier to be applied with christopher care  - Active skin problems addressed with nursing on daily rounds     Proph:  SCDs  PPI  Systemic heparin on hold     G: Line  Right radial arterial line placed 2/16  RIJ introducer with PAC placed 2/16  LIF Trialysis placed 2/17  Right femoral IABP placed 2/18  8F Left femoral reperfusion cannula placed 2/19  Left femoral 17F arterial ECMO cannula placed 2/19  Left femoral 25F venous ECMO cannula placed 2/19     F: Family: Mother updated at bedside       Restraints: Not indicated.     Code status: Full Code     I personally spent 74 minutes of critical care time directly and personally managing the patient exclusive of separately billable procedures.     A,B,C,D,E,F,G: reviewed     Discussed with Dr. Mehta.  Dispo: CTICU care for now.    CTICU TEAM PHONE [id number]

## 2024-02-21 NOTE — PROGRESS NOTES
Charla Bowles is a 33 y.o. female on day 6 of admission presenting with Cardiogenic shock (CMS/HCC).    Subjective   Completed IVIG. Worsening anemia requiring 1xPRBC. Intubated due to tachypnea & increased work of breathing 2/2 to pulmonary edema.     Objective     Physical Exam  Constitutional:       Appearance: She is ill-appearing and toxic-appearing.   HENT:      Head: Normocephalic.      Mouth/Throat:      Mouth: Mucous membranes are moist.      Pharynx: Oropharynx is clear. No oropharyngeal exudate or posterior oropharyngeal erythema.   Eyes:      Extraocular Movements: Extraocular movements intact.      Conjunctiva/sclera: Conjunctivae normal.      Pupils: Pupils are equal, round, and reactive to light.   Neck:      Vascular: No JVD.   Cardiovascular:      Rate and Rhythm: Normal rate and regular rhythm.      Pulses: Normal pulses.      Heart sounds: Normal heart sounds. No murmur heard.     No friction rub. No gallop.      Comments: Left femoral VA ECMO flowing ~4L/ FIO2 100% w/ Sweep 4. Right femoral IABP in place set 1:1 and augmenting appropriately.   Pulmonary:      Effort: Pulmonary effort is normal. No accessory muscle usage or retractions.      Breath sounds: Rhonchi and rales present.      Comments: Intubated and breathing comfortably on ventilator. Equal chest rise bilaterally. Thick oral secretions with small amount of blood tinge.   Abdominal:      General: Abdomen is flat. Bowel sounds are normal. There is no distension.      Palpations: Abdomen is soft. There is no mass.      Tenderness: There is no abdominal tenderness. There is no guarding.      Hernia: No hernia is present.      Comments: Dobhoff in right nare   Musculoskeletal:         General: Swelling (Generalized +1-2 edema) present. No tenderness. Normal range of motion.      Cervical back: Full passive range of motion without pain.   Skin:     General: Skin is warm and dry.      Capillary Refill: Capillary refill takes less  "than 2 seconds.      Coloration: Skin is not jaundiced.      Findings: No laceration, rash or wound.      Comments: Left leg cannulation site with mild oozing    Neurological:      General: No focal deficit present.      Mental Status: She is alert.      Comments: Intubated and sedated         Last Recorded Vitals  Blood pressure 112/57, pulse 98, temperature 36.9 °C (98.4 °F), temperature source Core, resp. rate 16, height 1.549 m (5' 1\"), weight 99.6 kg (219 lb 9.3 oz), SpO2 100 %.  Intake/Output last 3 Shifts:  I/O last 3 completed shifts:  In: 9182.6 (92.2 mL/kg) [P.O.:1160; I.V.:2637.6 (26.5 mL/kg); Blood:1050; Other:3885; IV Piggyback:450]  Out: 5345 (53.7 mL/kg) [Other:5340; Blood:5]  Weight: 99.6 kg     Relevant Results  Scheduled medications  acetaminophen, 650 mg, oral, q6h  [Held by provider] aspirin, 81 mg, oral, Daily  calcitriol, 0.5 mcg, oral, Daily  calcium carbonate, 1,250 mg, oral, BID with meals  esomeprazole, 40 mg, nasogastric tube, BID  ferrous sulfate, 60 mg of iron, oral, Daily  insulin lispro, 0-10 Units, subcutaneous, q4h  insulin NPH (Isophane), 12 Units, subcutaneous, Daily  levothyroxine, 200 mcg, nasogastric tube, Daily  multivitamin with iron-minerals, 15 mL, oral, Daily  nystatin, 5 mL, Swish & Swallow, q6h  perflutren lipid microspheres, 0.5-10 mL of dilution, intravenous, Once in imaging  perflutren protein A microsphere, 0.5 mL, intravenous, Once in imaging  polyethylene glycol, 17 g, oral, BID  [START ON 2/23/2024] predniSONE, 10 mg, oral, Once  predniSONE, 30 mg, oral, Daily   Followed by  [START ON 2/22/2024] predniSONE, 20 mg, oral, Daily  rocuronium, , ,   [Held by provider] rosuvastatin, 40 mg, oral, Nightly  sirolimus, 1 mg, oral, Daily  sulfamethoxazole-trimethoprim, 80 mg of trimethoprim, oral, Daily  sulfur hexafluoride microsphr, 2 mL, intravenous, Once in imaging  [START ON 2/22/2024] tacrolimus, 1.5 mg, oral, q12h TRICIA  [START ON 2/22/2024] valGANciclovir, 450 mg, oral, " q48h      Continuous medications  dextrose 5 % in water (D5W), 125 mL/hr, Last Rate: 125 mL/hr (02/21/24 0900)  EPINEPHrine, 0.04 mcg/kg/min (Dosing Weight)  lactated Ringer's, 5 mL/hr, Last Rate: 5 mL/hr (02/21/24 0700)  lactated Ringer's, 10 mL/hr, Last Rate: Stopped (02/20/24 1715)  PrismaSol 4/2.5, 2,400 mL/hr, Last Rate: 2,400 mL/hr (02/20/24 2214)  propofol, 5-50 mcg/kg/min (Dosing Weight), Last Rate: 36.981 mcg/kg/min (02/21/24 0954)      PRN medications  PRN medications: calcium gluconate, calcium gluconate, dextrose 10 % in water (D10W), dextrose, diphenhydrAMINE, glucagon, HYDROmorphone, HYDROmorphone, lactulose, magnesium sulfate, magnesium sulfate, oxygen, potassium chloride CR **OR** potassium chloride, potassium chloride, promethazine, rocuronium    Results for orders placed or performed during the hospital encounter of 02/15/24 (from the past 24 hour(s))   CBC   Result Value Ref Range    WBC 7.3 4.4 - 11.3 x10*3/uL    nRBC 9.5 (H) 0.0 - 0.0 /100 WBCs    RBC 2.63 (L) 4.00 - 5.20 x10*6/uL    Hemoglobin 7.5 (L) 12.0 - 16.0 g/dL    Hematocrit 21.8 (L) 36.0 - 46.0 %    MCV 83 80 - 100 fL    MCH 28.5 26.0 - 34.0 pg    MCHC 34.4 32.0 - 36.0 g/dL    RDW 14.9 (H) 11.5 - 14.5 %    Platelets 98 (L) 150 - 450 x10*3/uL   Fibrinogen   Result Value Ref Range    Fibrinogen 181 (L) 200 - 400 mg/dL   Coagulation Screen   Result Value Ref Range    Protime 14.1 (H) 9.8 - 12.8 seconds    INR 1.3 (H) 0.9 - 1.1    aPTT 27 27 - 38 seconds   Calcium, Ionized   Result Value Ref Range    POCT Calcium, Ionized 0.99 (L) 1.1 - 1.33 mmol/L   Magnesium   Result Value Ref Range    Magnesium 2.85 (H) 1.60 - 2.40 mg/dL   Renal function panel   Result Value Ref Range    Glucose 193 (H) 74 - 99 mg/dL    Sodium 120 (LL) 136 - 145 mmol/L    Potassium 5.0 3.5 - 5.3 mmol/L    Chloride 90 (L) 98 - 107 mmol/L    Bicarbonate 15 (L) 21 - 32 mmol/L    Anion Gap 20 10 - 20 mmol/L    Urea Nitrogen 80 (H) 6 - 23 mg/dL    Creatinine 5.78 (H) 0.50 -  1.05 mg/dL    eGFR 9 (L) >60 mL/min/1.73m*2    Calcium 7.5 (L) 8.6 - 10.6 mg/dL    Phosphorus 7.3 (H) 2.5 - 4.9 mg/dL    Albumin 3.0 (L) 3.4 - 5.0 g/dL   Prepare Plasma Exchange: 12 Units   Result Value Ref Range    PRODUCT CODE H6930A85     Unit Number [id number]     Unit ABO O     Unit RH POS     Dispense Status IS     Blood Expiration Date February 25, 2024 11:00 EST     PRODUCT BLOOD TYPE 5100     UNIT VOLUME 212     PRODUCT CODE X7475Z36     Unit Number [id number]     Unit ABO O     Unit RH POS     Dispense Status IS     Blood Expiration Date February 25, 2024 11:00 EST     PRODUCT BLOOD TYPE 5100     UNIT VOLUME 221     PRODUCT CODE S9857B41     Unit Number [id number]     Unit ABO O     Unit RH POS     Dispense Status IS     Blood Expiration Date February 25, 2024 11:00 EST     PRODUCT BLOOD TYPE 5100     UNIT VOLUME 217     PRODUCT CODE V4350U38     Unit Number [id number]     Unit ABO O     Unit RH POS     Dispense Status IS     Blood Expiration Date February 25, 2024 11:00 EST     PRODUCT BLOOD TYPE 5100     UNIT VOLUME 233     PRODUCT CODE J2094U64     Unit Number [id number]     Unit ABO O     Unit RH POS     Dispense Status IS     Blood Expiration Date February 25, 2024 11:00 EST     PRODUCT BLOOD TYPE 5100     UNIT VOLUME 227     PRODUCT CODE V3460G99     Unit Number [id number]     Unit ABO O     Unit RH POS     Dispense Status IS     Blood Expiration Date February 25, 2024 11:00 EST     PRODUCT BLOOD TYPE 5100     UNIT VOLUME 221     PRODUCT CODE U7906T35     Unit Number [id number]     Unit ABO O     Unit RH POS     Dispense Status IS     Blood Expiration Date February 25, 2024 11:00 EST     PRODUCT BLOOD TYPE 5100     UNIT VOLUME 301     PRODUCT CODE B1400L31     Unit Number [id number]     Unit ABO O     Unit RH POS     Dispense Status IS     Blood Expiration Date February 25, 2024 11:41 EST     PRODUCT BLOOD TYPE 5100     UNIT VOLUME 388     PRODUCT CODE A1332U03      Unit Number [id number]     Unit ABO O     Unit RH POS     Dispense Status IS     Blood Expiration Date February 25, 2024 11:41 EST     PRODUCT BLOOD TYPE 5100     UNIT VOLUME 231     PRODUCT CODE K3365V67     Unit Number [id number]     Unit ABO O     Unit RH POS     Dispense Status IS     Blood Expiration Date February 25, 2024 11:41 EST     PRODUCT BLOOD TYPE 5100     UNIT VOLUME 221     PRODUCT CODE A1376S32     Unit Number [id number]     Unit ABO O     Unit RH POS     Dispense Status IS     Blood Expiration Date February 25, 2024 11:41 EST     PRODUCT BLOOD TYPE 5100     UNIT VOLUME 229     PRODUCT CODE L3827W53     Unit Number [id number]     Unit ABO O     Unit RH NEG     Dispense Status IS     Blood Expiration Date February 25, 2024 11:41 EST     PRODUCT BLOOD TYPE 9500     UNIT VOLUME 195    Prepare Plasma   Result Value Ref Range    PRODUCT CODE P2708B93     Unit Number [id number]     Unit ABO O     Unit RH POS     Dispense Status IS     Blood Expiration Date February 25, 2024 11:41 EST     PRODUCT BLOOD TYPE 5100     UNIT VOLUME 217    Renal function panel   Result Value Ref Range    Glucose 188 (H) 74 - 99 mg/dL    Sodium 126 (L) 136 - 145 mmol/L    Potassium 5.0 3.5 - 5.3 mmol/L    Chloride 91 (L) 98 - 107 mmol/L    Bicarbonate 22 21 - 32 mmol/L    Anion Gap 18 10 - 20 mmol/L    Urea Nitrogen 73 (H) 6 - 23 mg/dL    Creatinine 5.17 (H) 0.50 - 1.05 mg/dL    eGFR 11 (L) >60 mL/min/1.73m*2    Calcium 8.8 8.6 - 10.6 mg/dL    Phosphorus 6.9 (H) 2.5 - 4.9 mg/dL    Albumin 3.2 (L) 3.4 - 5.0 g/dL   CBC   Result Value Ref Range    WBC 8.3 4.4 - 11.3 x10*3/uL    nRBC 9.4 (H) 0.0 - 0.0 /100 WBCs    RBC 2.76 (L) 4.00 - 5.20 x10*6/uL    Hemoglobin 7.8 (L) 12.0 - 16.0 g/dL    Hematocrit 22.6 (L) 36.0 - 46.0 %    MCV 82 80 - 100 fL    MCH 28.3 26.0 - 34.0 pg    MCHC 34.5 32.0 - 36.0 g/dL    RDW 14.9 (H) 11.5 - 14.5 %    Platelets 89 (L) 150 - 450 x10*3/uL   Calcium, Ionized   Result Value Ref  Range    POCT Calcium, Ionized 1.09 (L) 1.1 - 1.33 mmol/L   Blood Gas Arterial Full Panel   Result Value Ref Range    POCT pH, Arterial 7.16 (LL) 7.38 - 7.42 pH    POCT pCO2, Arterial 58 (H) 38 - 42 mm Hg    POCT pO2, Arterial 376 (H) 85 - 95 mm Hg    POCT SO2, Arterial 100 94 - 100 %    POCT Oxy Hemoglobin, Arterial 98.0 94.0 - 98.0 %    POCT Hematocrit Calculated, Arterial 24.0 (L) 36.0 - 46.0 %    POCT Sodium, Arterial 123 (L) 136 - 145 mmol/L    POCT Potassium, Arterial 5.1 3.5 - 5.3 mmol/L    POCT Chloride, Arterial 93 (L) 98 - 107 mmol/L    POCT Ionized Calcium, Arterial 1.13 1.10 - 1.33 mmol/L    POCT Glucose, Arterial 179 (H) 74 - 99 mg/dL    POCT Lactate, Arterial 0.9 0.4 - 2.0 mmol/L    POCT Base Excess, Arterial -7.7 (L) -2.0 - 3.0 mmol/L    POCT HCO3 Calculated, Arterial 20.7 (L) 22.0 - 26.0 mmol/L    POCT Hemoglobin, Arterial 8.1 (L) 12.0 - 16.0 g/dL    POCT Anion Gap, Arterial 14 10 - 25 mmo/L    Patient Temperature 37.0 degrees Celsius    FiO2 21 %   Prepare Plasma Exchange: 12 Units   Result Value Ref Range    PRODUCT CODE J0260X41     Unit Number [id number]     Unit ABO A     Unit RH POS     Dispense Status RE     Blood Expiration Date February 25, 2024 07:45 EST     PRODUCT BLOOD TYPE 6200     UNIT VOLUME 217     PRODUCT CODE H8068O60     Unit Number [id number]     Unit ABO A     Unit RH POS     Dispense Status RE     Blood Expiration Date February 25, 2024 07:50 EST     PRODUCT BLOOD TYPE 6200     UNIT VOLUME 217     PRODUCT CODE K9249D86     Unit Number [id number]     Unit ABO A     Unit RH POS     Dispense Status RE     Blood Expiration Date February 25, 2024 10:45 EST     PRODUCT BLOOD TYPE 6200     UNIT VOLUME 223     PRODUCT CODE S9653D18     Unit Number [id number]     Unit ABO A     Unit RH POS     Dispense Status RE     Blood Expiration Date February 25, 2024 07:45 EST     PRODUCT BLOOD TYPE 6200     UNIT VOLUME 217     PRODUCT CODE G3953L57     Unit Number  [id number]     Unit ABO A     Unit RH POS     Dispense Status RE     Blood Expiration Date February 25, 2024 07:50 EST     PRODUCT BLOOD TYPE 6200     UNIT VOLUME 217     PRODUCT CODE W8669L61     Unit Number [id number]     Unit ABO A     Unit RH POS     Dispense Status RE     Blood Expiration Date February 25, 2024 09:12 EST     PRODUCT BLOOD TYPE 6200     UNIT VOLUME 216     PRODUCT CODE O6623K76     Unit Number [id number]     Unit ABO A     Unit RH POS     Dispense Status RE     Blood Expiration Date February 23, 2024 15:06 EST     PRODUCT BLOOD TYPE 6200     UNIT VOLUME 342     PRODUCT CODE U6165E06     Unit Number [id number]     Unit ABO A     Unit RH POS     Dispense Status RE     Blood Expiration Date February 24, 2024 17:58 EST     PRODUCT BLOOD TYPE 6200     UNIT VOLUME 348     PRODUCT CODE K3968D78     Unit Number [id number]     Unit ABO A     Unit RH POS     Dispense Status RE     Blood Expiration Date February 25, 2024 07:45 EST     PRODUCT BLOOD TYPE 6200     UNIT VOLUME 316     PRODUCT CODE H2481S27     Unit Number [id number]     Unit ABO A     Unit RH POS     Dispense Status RE     Blood Expiration Date February 25, 2024 09:12 EST     PRODUCT BLOOD TYPE 6200     UNIT VOLUME 278     PRODUCT CODE D0079Z62     Unit Number [id number]     Unit ABO A     Unit RH NEG     Dispense Status RE     Blood Expiration Date February 25, 2024 08:20 EST     PRODUCT BLOOD TYPE 0600     UNIT VOLUME 217     PRODUCT CODE G8519O73     Unit Number [id number]     Unit ABO A     Unit RH NEG     Dispense Status RE     Blood Expiration Date February 25, 2024 09:12 EST     PRODUCT BLOOD TYPE 0600     UNIT VOLUME 280    POCT GLUCOSE   Result Value Ref Range    POCT Glucose 160 (H) 74 - 99 mg/dL   Surgical Pathology Exam   Result Value Ref Range    Case Report       Surgical Pathology                                Case: [id number]                                  Authorizing Provider:   Nick Childress DO        Collected:                                        Ordering Location:     [facility]       Received:            02/20/2024 [facility]                                     Center Cardiothoracic                                                                               Intensive Care                                                               Pathologist:           Christiane Peterson MD                                                            Specimen:    HEART TRANSPLANT BIOPSY, RIGHT VENTRICLE                                                   FINAL DIAGNOSIS       ALLOGRAFT HEART, ENDOMYOCARDIAL BIOPSY:   -- NEGATIVE FOR T CELL-MEDIATED REJECTION, GRADE 0R, ISHLT 2004  -- pAMR0: NEGATIVE FOR PATHOLOGIC AMR: BOTH MORPHOLOGICAL AND IMMUNOPHENOTYPIC EVALUATIONS ARE NEGATIVE    Comment:  A total of 3 endomyocardial fragments are examined, none of which are involved by inflammation.  There are no features of AMR noted. C4d and C3d are negative in interstitial capillaries by immunofluorescence.    Findings relayed to clinical team via  email on 2/20/2024 at 5:18 PM.    Evaluation of surveillance biopsies in heart transplant recipients includes assessment for T cell-mediated rejection (TCMR) and antibody mediated rejection (AMR) by light microscopy and immunochemical techniques. Controls are adequate.    **Revised Rejection Categories  ISHLT 1990                    ISHLT 2004  Grade 0               =         Grade 0R  Grade 1A            =          Grade 1R  Grade 1B            =          Grade 1R  Grade 2              =          Grade 1R  Grade 3A           =           Grade 2R  Grade 3B           =           Grade 3R  Grade 4             =           Grade 3R    Revised grading of cardiac antibody-mediated rejection  pAMR0 - negative for pathologic AMR  pAMR1 (H+) - histopathologic AMR alone  pAMR1 (1+) - immunopathologic AMR alone  pAMR2 - pathologic AMR  pAMR3 - severe pathologic AMR     "              By the signature on this report, the individual or group listed as making the Final Interpretation/Diagnosis certifies that they have reviewed this case.       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  One piece is frozen for immunofluorescence and then melted.  The entire specimen is submitted in toto in one cassette for light microscopy.   [facility]        Disclaimer       One or more of the reagents used to perform assays on this specimen MAY have contained components considered to be analyte specific reagents (ASR's).  ASR's have not been cleared or approved by the U.S. Food and Drug Administration.  These assays were developed and their performance characteristics determined by the Department of Pathology at [facility]. The FDA does not require this test to go through premarket FDA review. This test is used for clinical purposes. It should not be regarded as investigational or for research. This laboratory is certified under the Clinical Laboratory Improvement Amendments (CLIA) as qualified to perform high complexity clinical laboratory testing.  The assays were performed with appropriate positive and negative controls which stained appropriately.     Blood Gas Arterial Full Panel   Result Value Ref Range    POCT pH, Arterial 7.20 (LL) 7.38 - 7.42 pH    POCT pCO2, Arterial 54 (H) 38 - 42 mm Hg    POCT pO2, Arterial 434 (H) 85 - 95 mm Hg    POCT SO2, Arterial 100 94 - 100 %    POCT Oxy Hemoglobin, Arterial 98.1 (H) 94.0 - 98.0 %    POCT Hematocrit Calculated, Arterial 24.0 (L) 36.0 - 46.0 %    POCT Sodium, Arterial 123 (L) 136 - 145 mmol/L    POCT Potassium, Arterial 5.0 3.5 - 5.3 mmol/L    POCT Chloride, Arterial 94 (L) 98 - " 107 mmol/L    POCT Ionized Calcium, Arterial 1.17 1.10 - 1.33 mmol/L    POCT Glucose, Arterial 168 (H) 74 - 99 mg/dL    POCT Lactate, Arterial 0.7 0.4 - 2.0 mmol/L    POCT Base Excess, Arterial -6.7 (L) -2.0 - 3.0 mmol/L    POCT HCO3 Calculated, Arterial 21.1 (L) 22.0 - 26.0 mmol/L    POCT Hemoglobin, Arterial 8.1 (L) 12.0 - 16.0 g/dL    POCT Anion Gap, Arterial 13 10 - 25 mmo/L    Patient Temperature 37.0 degrees Celsius    FiO2 21 %   Prepare Plasma   Result Value Ref Range    PRODUCT CODE H7111G74     Unit Number [id number]     Unit ABO A     Unit RH POS     Dispense Status RE     Blood Expiration Date February 25, 2024 07:45 EST     PRODUCT BLOOD TYPE 6200     UNIT VOLUME 222    Blood Gas Arterial Full Panel   Result Value Ref Range    POCT pH, Arterial 7.32 (L) 7.38 - 7.42 pH    POCT pCO2, Arterial 40 38 - 42 mm Hg    POCT pO2, Arterial 442 (H) 85 - 95 mm Hg    POCT SO2, Arterial 100 94 - 100 %    POCT Oxy Hemoglobin, Arterial 98.0 94.0 - 98.0 %    POCT Hematocrit Calculated, Arterial 23.0 (L) 36.0 - 46.0 %    POCT Sodium, Arterial 122 (L) 136 - 145 mmol/L    POCT Potassium, Arterial 5.0 3.5 - 5.3 mmol/L    POCT Chloride, Arterial 95 (L) 98 - 107 mmol/L    POCT Ionized Calcium, Arterial 1.14 1.10 - 1.33 mmol/L    POCT Glucose, Arterial 166 (H) 74 - 99 mg/dL    POCT Lactate, Arterial 0.7 0.4 - 2.0 mmol/L    POCT Base Excess, Arterial -5.1 (L) -2.0 - 3.0 mmol/L    POCT HCO3 Calculated, Arterial 20.6 (L) 22.0 - 26.0 mmol/L    POCT Hemoglobin, Arterial 7.8 (L) 12.0 - 16.0 g/dL    POCT Anion Gap, Arterial 11 10 - 25 mmo/L    Patient Temperature 37.0 degrees Celsius    FiO2 21 %   Transthoracic Echo (TTE) Limited   Result Value Ref Range    BSA 2.07 m2   Sodium   Result Value Ref Range    Sodium 126 (L) 136 - 145 mmol/L   Calcium, Ionized   Result Value Ref Range    POCT Calcium, Ionized 1.14 1.1 - 1.33 mmol/L   Magnesium   Result Value Ref Range    Magnesium 2.62 (H) 1.60 - 2.40 mg/dL   Renal function panel    Result Value Ref Range    Glucose 166 (H) 74 - 99 mg/dL    Sodium 126 (L) 136 - 145 mmol/L    Potassium 4.8 3.5 - 5.3 mmol/L    Chloride 94 (L) 98 - 107 mmol/L    Bicarbonate 22 21 - 32 mmol/L    Anion Gap 15 10 - 20 mmol/L    Urea Nitrogen 65 (H) 6 - 23 mg/dL    Creatinine 4.60 (H) 0.50 - 1.05 mg/dL    eGFR 12 (L) >60 mL/min/1.73m*2    Calcium 8.5 (L) 8.6 - 10.6 mg/dL    Phosphorus 5.9 (H) 2.5 - 4.9 mg/dL    Albumin 3.0 (L) 3.4 - 5.0 g/dL   Blood Gas Arterial Full Panel   Result Value Ref Range    POCT pH, Arterial 7.42 7.38 - 7.42 pH    POCT pCO2, Arterial 32 (L) 38 - 42 mm Hg    POCT pO2, Arterial 450 (H) 85 - 95 mm Hg    POCT SO2, Arterial 100 94 - 100 %    POCT Oxy Hemoglobin, Arterial 98.5 (H) 94.0 - 98.0 %    POCT Hematocrit Calculated, Arterial 22.0 (L) 36.0 - 46.0 %    POCT Sodium, Arterial 123 (L) 136 - 145 mmol/L    POCT Potassium, Arterial 5.0 3.5 - 5.3 mmol/L    POCT Chloride, Arterial 96 (L) 98 - 107 mmol/L    POCT Ionized Calcium, Arterial 1.15 1.10 - 1.33 mmol/L    POCT Glucose, Arterial 163 (H) 74 - 99 mg/dL    POCT Lactate, Arterial 0.8 0.4 - 2.0 mmol/L    POCT Base Excess, Arterial -3.3 (L) -2.0 - 3.0 mmol/L    POCT HCO3 Calculated, Arterial 20.8 (L) 22.0 - 26.0 mmol/L    POCT Hemoglobin, Arterial 7.4 (L) 12.0 - 16.0 g/dL    POCT Anion Gap, Arterial 11 10 - 25 mmo/L    Patient Temperature 37.0 degrees Celsius    FiO2 21 %   POCT GLUCOSE   Result Value Ref Range    POCT Glucose 156 (H) 74 - 99 mg/dL   Sodium   Result Value Ref Range    Sodium 126 (L) 136 - 145 mmol/L   CBC   Result Value Ref Range    WBC 5.2 4.4 - 11.3 x10*3/uL    nRBC 6.4 (H) 0.0 - 0.0 /100 WBCs    RBC 2.44 (L) 4.00 - 5.20 x10*6/uL    Hemoglobin 6.8 (L) 12.0 - 16.0 g/dL    Hematocrit 19.6 (L) 36.0 - 46.0 %    MCV 80 80 - 100 fL    MCH 27.9 26.0 - 34.0 pg    MCHC 34.7 32.0 - 36.0 g/dL    RDW 14.8 (H) 11.5 - 14.5 %    Platelets 65 (L) 150 - 450 x10*3/uL   Lactate Dehydrogenase   Result Value Ref Range     (H) 84 - 246 U/L    Hepatic function panel   Result Value Ref Range    Albumin 2.7 (L) 3.4 - 5.0 g/dL    Bilirubin, Total 0.7 0.0 - 1.2 mg/dL    Bilirubin, Direct 0.3 0.0 - 0.3 mg/dL    Alkaline Phosphatase 50 33 - 110 U/L     (H) 7 - 45 U/L     (H) 9 - 39 U/L    Total Protein 4.9 (L) 6.4 - 8.2 g/dL   Calcium, Ionized   Result Value Ref Range    POCT Calcium, Ionized 1.08 (L) 1.1 - 1.33 mmol/L   Magnesium   Result Value Ref Range    Magnesium 2.42 (H) 1.60 - 2.40 mg/dL   Basic Metabolic Panel   Result Value Ref Range    Glucose 173 (H) 74 - 99 mg/dL    Sodium 126 (L) 136 - 145 mmol/L    Potassium 4.8 3.5 - 5.3 mmol/L    Chloride 94 (L) 98 - 107 mmol/L    Bicarbonate 23 21 - 32 mmol/L    Anion Gap 14 10 - 20 mmol/L    Urea Nitrogen 61 (H) 6 - 23 mg/dL    Creatinine 4.11 (H) 0.50 - 1.05 mg/dL    eGFR 14 (L) >60 mL/min/1.73m*2    Calcium 7.6 (L) 8.6 - 10.6 mg/dL   Phosphorus   Result Value Ref Range    Phosphorus 4.9 2.5 - 4.9 mg/dL   Prepare RBC: 1 Units   Result Value Ref Range    PRODUCT CODE F3353C85     Unit Number [id number]     Unit ABO O     Unit RH POS     XM INTEP COMP     Dispense Status TR     Blood Expiration Date March 13, 2024 23:59 EDT     PRODUCT BLOOD TYPE 5100     UNIT VOLUME 279    Blood Gas Arterial Full Panel   Result Value Ref Range    POCT pH, Arterial 7.31 (L) 7.38 - 7.42 pH    POCT pCO2, Arterial 45 (H) 38 - 42 mm Hg    POCT pO2, Arterial 356 (H) 85 - 95 mm Hg    POCT SO2, Arterial 100 94 - 100 %    POCT Oxy Hemoglobin, Arterial 98.0 94.0 - 98.0 %    POCT Hematocrit Calculated, Arterial 24.0 (L) 36.0 - 46.0 %    POCT Sodium, Arterial 124 (L) 136 - 145 mmol/L    POCT Potassium, Arterial 4.9 3.5 - 5.3 mmol/L    POCT Chloride, Arterial 97 (L) 98 - 107 mmol/L    POCT Ionized Calcium, Arterial 1.06 (L) 1.10 - 1.33 mmol/L    POCT Glucose, Arterial 174 (H) 74 - 99 mg/dL    POCT Lactate, Arterial 0.9 0.4 - 2.0 mmol/L    POCT Base Excess, Arterial -3.4 (L) -2.0 - 3.0 mmol/L    POCT HCO3 Calculated,  Arterial 22.7 22.0 - 26.0 mmol/L    POCT Hemoglobin, Arterial 8.1 (L) 12.0 - 16.0 g/dL    POCT Anion Gap, Arterial 9 (L) 10 - 25 mmo/L    Patient Temperature 37.0 degrees Celsius    FiO2 100 %   Coagulation Screen   Result Value Ref Range    Protime 14.9 (H) 9.8 - 12.8 seconds    INR 1.3 (H) 0.9 - 1.1    aPTT 133 (HH) 27 - 38 seconds   Sodium   Result Value Ref Range    Sodium 126 (L) 136 - 145 mmol/L   Fibrinogen   Result Value Ref Range    Fibrinogen 219 200 - 400 mg/dL   Renal function panel   Result Value Ref Range    Glucose 200 (H) 74 - 99 mg/dL    Sodium 126 (L) 136 - 145 mmol/L    Potassium 4.8 3.5 - 5.3 mmol/L    Chloride 94 (L) 98 - 107 mmol/L    Bicarbonate 22 21 - 32 mmol/L    Anion Gap 15 10 - 20 mmol/L    Urea Nitrogen 57 (H) 6 - 23 mg/dL    Creatinine 3.78 (H) 0.50 - 1.05 mg/dL    eGFR 16 (L) >60 mL/min/1.73m*2    Calcium 7.7 (L) 8.6 - 10.6 mg/dL    Phosphorus 5.5 (H) 2.5 - 4.9 mg/dL    Albumin 2.7 (L) 3.4 - 5.0 g/dL   Heparin Assay, UFH   Result Value Ref Range    Heparin Unfractionated 1.0 See Comment Below for Therapeutic Ranges IU/mL   Magnesium   Result Value Ref Range    Magnesium 2.43 (H) 1.60 - 2.40 mg/dL   Calcium, Ionized   Result Value Ref Range    POCT Calcium, Ionized 1.08 (L) 1.1 - 1.33 mmol/L   Blood Gas Arterial Full Panel   Result Value Ref Range    POCT pH, Arterial 7.42 7.38 - 7.42 pH    POCT pCO2, Arterial 34 (L) 38 - 42 mm Hg    POCT pO2, Arterial 525 (H) 85 - 95 mm Hg    POCT SO2, Arterial 100 94 - 100 %    POCT Oxy Hemoglobin, Arterial 97.4 94.0 - 98.0 %    POCT Hematocrit Calculated, Arterial 25.0 (L) 36.0 - 46.0 %    POCT Sodium, Arterial 123 (L) 136 - 145 mmol/L    POCT Potassium, Arterial 5.0 3.5 - 5.3 mmol/L    POCT Chloride, Arterial 96 (L) 98 - 107 mmol/L    POCT Ionized Calcium, Arterial 1.11 1.10 - 1.33 mmol/L    POCT Glucose, Arterial 190 (H) 74 - 99 mg/dL    POCT Lactate, Arterial 0.7 0.4 - 2.0 mmol/L    POCT Base Excess, Arterial -2.0 -2.0 - 3.0 mmol/L    POCT HCO3  Calculated, Arterial 22.1 22.0 - 26.0 mmol/L    POCT Hemoglobin, Arterial 8.2 (L) 12.0 - 16.0 g/dL    POCT Anion Gap, Arterial 10 10 - 25 mmo/L    Patient Temperature 37.0 degrees Celsius    FiO2 40 %   CBC   Result Value Ref Range    WBC 6.8 4.4 - 11.3 x10*3/uL    nRBC 2.5 (H) 0.0 - 0.0 /100 WBCs    RBC 2.74 (L) 4.00 - 5.20 x10*6/uL    Hemoglobin 7.6 (L) 12.0 - 16.0 g/dL    Hematocrit 22.4 (L) 36.0 - 46.0 %    MCV 82 80 - 100 fL    MCH 27.7 26.0 - 34.0 pg    MCHC 33.9 32.0 - 36.0 g/dL    RDW 15.4 (H) 11.5 - 14.5 %    Platelets 58 (L) 150 - 450 x10*3/uL   Blood Gas Arterial Full Panel   Result Value Ref Range    POCT pH, Arterial 7.50 (H) 7.38 - 7.42 pH    POCT pCO2, Arterial 29 (L) 38 - 42 mm Hg    POCT pO2, Arterial 419 (H) 85 - 95 mm Hg    POCT SO2, Arterial 100 94 - 100 %    POCT Oxy Hemoglobin, Arterial 98.0 94.0 - 98.0 %    POCT Hematocrit Calculated, Arterial 31.0 (L) 36.0 - 46.0 %    POCT Sodium, Arterial 122 (L) 136 - 145 mmol/L    POCT Potassium, Arterial 4.5 3.5 - 5.3 mmol/L    POCT Chloride, Arterial 96 (L) 98 - 107 mmol/L    POCT Ionized Calcium, Arterial 1.04 (L) 1.10 - 1.33 mmol/L    POCT Glucose, Arterial 198 (H) 74 - 99 mg/dL    POCT Lactate, Arterial 1.3 0.4 - 2.0 mmol/L    POCT Base Excess, Arterial 0.0 -2.0 - 3.0 mmol/L    POCT HCO3 Calculated, Arterial 22.6 22.0 - 26.0 mmol/L    POCT Hemoglobin, Arterial 10.3 (L) 12.0 - 16.0 g/dL    POCT Anion Gap, Arterial 8 (L) 10 - 25 mmo/L    Patient Temperature 37.0 degrees Celsius    FiO2 40 %   Renal function panel   Result Value Ref Range    Glucose 201 (H) 74 - 99 mg/dL    Sodium 127 (L) 136 - 145 mmol/L    Potassium 4.4 3.5 - 5.3 mmol/L    Chloride 96 (L) 98 - 107 mmol/L    Bicarbonate 23 21 - 32 mmol/L    Anion Gap 12 10 - 20 mmol/L    Urea Nitrogen 50 (H) 6 - 23 mg/dL    Creatinine 3.46 (H) 0.50 - 1.05 mg/dL    eGFR 17 (L) >60 mL/min/1.73m*2    Calcium 7.6 (L) 8.6 - 10.6 mg/dL    Phosphorus 3.8 2.5 - 4.9 mg/dL    Albumin 2.5 (L) 3.4 - 5.0 g/dL    Heparin Assay, UFH   Result Value Ref Range    Heparin Unfractionated 1.5 (HH) See Comment Below for Therapeutic Ranges IU/mL   POCT GLUCOSE   Result Value Ref Range    POCT Glucose 190 (H) 74 - 99 mg/dL         Assessment/Plan   Assessment: Ms. 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    Originally presented to [facility] ED on 2/15/24 with complaints of N/V x 3 days and inability to keep medications down. POCUS revealed acute systolic heart failure w/ severe BIV dysfunction when compared to previous images in 11/2023. Also noted to have HIRAM and transaminitis. Immunosuppression notably below therapeutic range. Admitted to HFICU and treated for suspected acute cellular vs. acute antibody mediated rejection; however, cardiac biopsy negative for signs of rejection. Despite rejection therapy, inotropes and MCS via IABP, patient's end organ function continued to worsen without improvement in cardiac function. Ultimately placed on left femoral VA ECMO w/ Dr. Marina on 2/19/2024 and transferred to CTICU.     CTICU course complicated by ongoing anemia requiring transfusion requirements, volume overload & intubation (tachypnea and increased work of breathing 2/2 pulmonary edema).       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 AMR?  #Acute renal failure 2/2 to cardiorenal syndrome   #Acute transaminitis     Donor/Recipient Infectious history:  CMV: -/+ (last collected 2/16/24)  Toxo: -/-   Hep C: -/-    Rejection/Prophylaxis (transplants):  - Steroids: Prednisone taper started 2/18/24 following 3xmethylprednisone pulse: Decrease daily, PO prednisone  60mg, 50mg, 40mg, 30mg, 20mg and then continue on 10mg daily   - Tacrolimus: 1.5mg PO @ 0630 & 1.5mg PO @ 1830 w/ daily levels drawn @ 0600  - Serolimus: 1.0mg PO daily w/ daily levels drawn at 0600  - Tacrolimus goal troughs: 3-5  - Serolimus goal troughs: 7-9  - Combined goal of 10-12  - Antifungals: nystatin 500,000units Q6  - Antivirals: valcyte 450mg Q48hrs   - Anti PCP & Toxoplasmosis: Bactrim SS daily      Last cardiac biopsy: 2/16/24 with ACR1 and no AMR  Last HLA: 2/16/24 negative for DSAs   Last RHC: 2/16/24 w/ RA 11, PAP 34/14(23), W 19 and C.I. 2.3  Last LHC: 2/18/24 negative for CAV and CAD   Last TTE/BOB: 2/20/24 continue to show severe BIV dysfunction   Osteopenia/osteoporosis prophylaxis: Vitamin D3 and calcium supplements  Peptic/gastric ulcer prophylaxis: Pantoprazole 40mg daily   CAV Prophylaxis: Hold Aspirin 81mg daily while on systemic heparin. Okay to resume pravastatin      - Unclear cause of acute myocardial dysfunction. Differentials include: acute ACR vs. AMR vs. viral myocarditis vs. acute lupus myocarditis; however, 2xallograft biopsies on 2/16 & 2/20 remain negative for ACR & AMR. Notably, both biopsies taken after rejection therapies implemented which may have reduced areas of graft damage.    - DSAs remain negative; however, patient may have non-HLA antibodies present. 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   - Negative CAV & CAD via LHC on 2/18/24   - Remains on MCS via right femoral IABP set 1:1 and augmenting appropriately & left femoral VA ECMO w/ appropriate flows ~4L/FIO2 100% and sweep 4.    - Remains on CVVH due to ARF   - LFTs improved and lactate normalized.    - Completed methylpred steroid pulse w/ 1g Q24 x 3 days (2/16-2/18)  - Thymoglobulin doses: 2/18 & 2/19   - IVIG + PLEX Session: 2/18 & 2/20   - Continue PO prednisone taper  - Continue optimization of immunosuppression   - No further thymo, PLEX or IVIG treatment at this time with multiple biopsies  negative for ACR & AMR   -  Continue prophylactic antimicrobials due to immunosuppression: valcyte, bactrim and nystatin   - Thrombocytopenia likely multifactorial from recent thymoglobulin, MCS & CVVH. Continue to hold aspirin and agree with PF4 to rule out HIT.   - At this time due to the patient's critical illness (including intubation, pulmonary edema and acute renal failure), patient is not a candidate for retransplantation. Will continue to discuss as course progresses.     Patient remains critically ill with multisystem organ failure requiring VA ECMO, IABP, CVVH and intubation. Appreciate continued CTICU care/management.    Heart Transplant Team:   [employer] Chat  [id number] during day shift  [id number] during night shift     Keith Jain, CNP

## 2024-02-21 NOTE — PROGRESS NOTES
Subjective Data:  Patient seen this AM now on VA ecmo, with IABP, CRRT, intubated and on vasopressors       Objective Data:  Last Recorded Vitals:  Vitals:    02/21/24 0800 02/21/24 0815 02/21/24 0900 02/21/24 1000   BP:       Pulse: 93  94 93   Resp: 16  16 16   Temp: 36.9 °C (98.4 °F)  37.1 °C (98.8 °F) 37.1 °C (98.8 °F)   TempSrc: Core  Core Core   SpO2: 100% 100% 100% 100%   Weight:       Height:           Last Labs:  CBC - 2/21/2024:  2:54 AM  6.8 7.6 58    22.4      CMP - 2/21/2024:  8:03 AM  7.6 4.9 109 --- 0.7   3.8 2.5 100 50      PTT - 2/21/2024:  2:46 AM  1.3   14.9 133     TROPHS   Date/Time Value Ref Range Status   02/16/2024 01:16  0 - 34 ng/L Final     Comment:     Previous result verified on 2/16/2024 0018 on specimen/case [id number] called with component [facility] for procedure Troponin I, High Sensitivity with value 125 ng/L.   02/15/2024 10:03  0 - 34 ng/L Final   11/10/2022 11:22  0 - 34 ng/L Final     Comment:     .  Less than 99th percentile of normal range cutoff-  Female and children under 18 years old <35 ng/L; Male <54 ng/L: Negative  Repeat testing should be performed if clinically indicated.   .  Female and children under 18 years old  ng/L; Male  ng/L:  Consistent with possible cardiac damage and possible increased clinical   risk. Serial measurements may help to assess extent of myocardial damage.   .  >120 ng/L: Consistent with cardiac damage, increased clinical risk and  myocardial infarction. Serial measurements may help assess extent of   myocardial damage.   .   NOTE: Children less than 1 year old may have higher baseline troponin   levels and results should be interpreted in conjunction with the overall   clinical context.  .  NOTE: Troponin I testing is performed using a different   testing methodology at [facility] than at other   [facility] hospitals. Direct result comparisons should only   be made within the same method.       BNP    Date/Time Value Ref Range Status   02/16/2024 01:16 AM >5,000 0 - 99 pg/mL Final   02/15/2024 10:03 PM >5,000 0 - 99 pg/mL Final     HGBA1C   Date/Time Value Ref Range Status   02/16/2024 01:16 AM 6.3 see below % Final   03/17/2023 08:38 AM 5.7 % Final     Comment:          Diagnosis of Diabetes-Adults   Non-Diabetic: < or = 5.6%   Increased risk for developing diabetes: 5.7-6.4%   Diagnostic of diabetes: > or = 6.5%  .       Monitoring of Diabetes                Age (y)     Therapeutic Goal (%)   Adults:          >18           <7.0   Pediatrics:    13-18           <7.5                   7-12           <8.0                   0- 6            7.5-8.5   American Diabetes Association. Diabetes Care 33(S1), Jan 2010.     12/14/2022 03:41 PM 6.4 % Final     Comment:          Diagnosis of Diabetes-Adults   Non-Diabetic: < or = 5.6%   Increased risk for developing diabetes: 5.7-6.4%   Diagnostic of diabetes: > or = 6.5%  .       Monitoring of Diabetes                Age (y)     Therapeutic Goal (%)   Adults:          >18           <7.0   Pediatrics:    13-18           <7.5                   7-12           <8.0                   0- 6            7.5-8.5   American Diabetes Association. Diabetes Care 33(S1), Jan 2010.       LDLCALC   Date/Time Value Ref Range Status   02/16/2024 01:16 AM 94 <=99 mg/dL Final     Comment:                                 Near   Borderline      AGE      Desirable  Optimal    High     High     Very High     0-19 Y     0 - 109     ---    110-129   >/= 130     ----    20-24 Y     0 - 119     ---    120-159   >/= 160     ----      >24 Y     0 -  99   100-129  130-159   160-189     >/=190       VLDL   Date/Time Value Ref Range Status   02/16/2024 01:16 AM 28 0 - 40 mg/dL Final   07/18/2023 09:24 AM 32 0 - 40 mg/dL Final   03/17/2023 08:38 AM 40 0 - 40 mg/dL Final   11/10/2022 11:22 PM 44 0 - 40 mg/dL Final      Last I/O:  I/O last 3 completed shifts:  In: 9182.6 (92.2 mL/kg) [P.O.:1160; I.V.:2637.6  (26.5 mL/kg); Blood:1050; Other:3885; IV Piggyback:450]  Out: 5345 (53.7 mL/kg) [Other:5340; Blood:5]  Weight: 99.6 kg     Inpatient Medications:  Scheduled medications   Medication Dose Route Frequency    acetaminophen  650 mg oral q6h    [Held by provider] aspirin  81 mg oral Daily    calcitriol  0.5 mcg oral Daily    calcium carbonate  1,250 mg oral BID with meals    esomeprazole  40 mg nasogastric tube BID    ferrous sulfate  60 mg of iron oral Daily    insulin lispro  0-10 Units subcutaneous q4h    insulin NPH (Isophane)  12 Units subcutaneous Daily    levothyroxine  200 mcg nasogastric tube Daily    multivitamin with iron-minerals  15 mL oral Daily    nystatin  5 mL Swish & Swallow q6h    perflutren lipid microspheres  0.5-10 mL of dilution intravenous Once in imaging    perflutren protein A microsphere  0.5 mL intravenous Once in imaging    polyethylene glycol  17 g oral BID    [START ON 2/23/2024] predniSONE  10 mg oral Once    predniSONE  30 mg oral Daily    Followed by    [START ON 2/22/2024] predniSONE  20 mg oral Daily    rocuronium        [Held by provider] rosuvastatin  40 mg oral Nightly    sirolimus  1 mg oral Daily    sulfamethoxazole-trimethoprim  80 mg of trimethoprim oral Daily    sulfur hexafluoride microsphr  2 mL intravenous Once in imaging    [START ON 2/22/2024] tacrolimus  1.5 mg oral q12h TIRCIA    [START ON 2/22/2024] valGANciclovir  450 mg oral q48h     PRN medications   Medication    calcium gluconate    calcium gluconate    dextrose 10 % in water (D10W)    dextrose    diphenhydrAMINE    glucagon    HYDROmorphone    HYDROmorphone    lactulose    magnesium sulfate    magnesium sulfate    oxygen    potassium chloride CR    Or    potassium chloride    potassium chloride    promethazine    rocuronium     Continuous Medications   Medication Dose Last Rate    dextrose 5 % in water (D5W)  125 mL/hr 125 mL/hr (02/21/24 0900)    EPINEPHrine  0.04 mcg/kg/min (Dosing Weight)      lactated Ringer's  5  mL/hr 5 mL/hr (24 0700)    lactated Ringer's  10 mL/hr Stopped (24 1715)    norepinephrine  0.01-0.5 mcg/kg/min (Dosing Weight)      PrismaSol 4/2.5  2,400 mL/hr 2,400 mL/hr (24 1012)    propofol  5-50 mcg/kg/min (Dosing Weight) 36.981 mcg/kg/min (24 0954)     Physical Exam  Constitutional:       Appearance: She is ill-appearing.   Cardiovascular:      Rate and Rhythm: Tachycardia present.      Pulses:           Dorsalis pedis pulses are 1+ on the right side and 1+ on the left side.      Comments: IABP inflate/ deflate auscultated  Pulmonary:      Comments: Intubated, mechanical breath sounds   Musculoskeletal:      Right lower le+ Edema present.      Left lower le+ Edema present.   Skin:     General: Skin is warm.      Comments: Oozing noted left femoral VA access site; right femoral site stable, minimal oozing   Neurological:      Mental Status: She is lethargic.        Assessment/Plan   Charla Bowles is a 33 year old female with past medical history of heart transplant in 2022 with postoperative course complicated by upper extremity/internal jugular DVTs, and asymptomatic 2R rejection in 2022. She was admitted to HFICU on 2/15 with concern for cardiogenic shock secondary to allograft rejection and decompensated heart failure with multiorgan dysfunction including significant elevation of liver enzymes and nonoliguric acute kidney injury. Endomyocardial biopsy on  revealed mild ACR with +CD4s and negative HLAs, however multisystem organ failure persisted with increased inotropic requirements. IABP placed on . Transferred to CTICU on  for VA ECMO cannulation with Dr. Marina for persistent multisystem dysfunction.     : IABP placed in cath lab, [facility] showed right dominant coronary circulation with no significant coronary artery disease, LVEDP 20    : multi-disciplinary review convened; given persistent requirement for pharmacological and mechanical  support, and with the addition of oliguric/anuric renal insufficiency, decision made to transfer to CTICU and initiate VA ECMO    2/20: pending endomyocardial biopsy today for further investigation of allograft dysfunction    2/21: on VA ecmo, with IABP, CRRT, intubated and on vasopressors    Cardiogenic shock 2/2 suspected allograft rejection  S/p OHT 3/2022  - Intra- aortic balloon pump placed on 2/18, VA ECMO cannulation 2/19  - CXR reviewed today, placement appears satisfactory  - Current IABP settings: 1:1, augmentation 85, assisted BP 76/68 (75) site with no hematoma/bleeding   - Plan to wean as hemodynamics per primary team     Recommendations:  - daily CXR with IABP  - please maintain strict bedrest while IABP in place  - closely monitor groin insertion site and distal pulses  - May elevate HOB no greater than 30 degrees  - May log roll patient side to side without flexing involved extremity  - Interventional cardiology will continue to follow, will defer primary care to CICU team     Full Code    Jeyson Hi, APRN-CNP, DNP

## 2024-02-21 NOTE — CONSULTS
"Nutrition Initial Assessment:   Nutrition Assessment    Reason for Assessment: Provider consult order    Patient is a 33 y.o. 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 Small bore NG placed & plan to start feeds today.     PMHx sig for stage D HFrEF (PPCM) s/p ICD s/p CardioMEMs, hypothyroidism 2/2 thyroidectomy on replacement therapy, obesity s/p gastric bypass (2017), and SLE who is s/p OHT (3/31/2022) with a post-OHT course complicated by RIJ/RUE DVTs, leukopenia, left groin seroma, worsening renal function, asymptomatic 2R ACR (11/2022) treated with steroids, and thrush     Nutrition History:  Food and Nutrient History: Unknown - pt intubated & sedated, unable to provide history, no family at bedside at visit.  Food Allergies/Intolerances:  None  GI Symptoms: unknown  Oral Problems:  unknown       Anthropometrics:  Height: 154.9 cm (5' 1\")   Weight: 99.6 kg (219 lb 9.3 oz)   BMI (Calculated): 41.51  IBW/kg (Dietitian Calculated): 47.7 kg  Percent of IBW: 209 %     Date/Time Weight Dosing Weight   02/21/24 0835 -- 99.6 kg (219 lb 9.3 oz)   02/21/24 0317 99.6 kg (219 lb 9.3 oz) --   02/20/24 0200 103 kg (226 lb 13.7 oz) --   02/19/24 1631 100 kg (220 lb 7.4 oz) --   02/19/24 0547 100 kg (221 lb 5.5 oz) --   02/17/24 0600 99.5 kg (219 lb 5.7 oz) --   02/16/24 2154 99 kg (218 lb 4.1 oz) --   02/16/24 0546 99 kg (218 lb 4.1 oz) --   02/16/24 0210 99 kg (218 lb 4.1 oz) --   02/16/24 0100 99 kg (218 lb 4.1 oz) --   02/15/24 2116 92.1 kg (203 lb) --     Weight History:   Wt Readings from Last 10 Encounters:   02/21/24 99.6 kg (219 lb 9.3 oz)   12/07/23 92.1 kg (203 lb)   12/01/23 93 kg (205 lb)   11/29/23 92.9 kg (204 lb 12.8 oz)   11/09/23 91.3 kg (201 lb 3.2 oz)   08/17/23 87.4 kg (192 lb 11.2 oz)   06/14/23 83 kg (183 lb)   05/10/23 82.6 kg (182 lb)   04/20/23 82.1 kg (181 lb) "   03/29/23 81 kg (178 lb 9.6 oz)      Weight Change %:   Weight History / % Weight Change: wt >200# since November 2023 ; up total 41# in past 10 months (3/29/23 thru 2/21/24) -- at risk for wt changes from fluid shifts given comorbidities heart failure and renal failure ; pt also was on steroids  Significant Weight Gain:  (23% GAIN in less than 1 year)    Nutrition Focused Physical Exam Findings:    Subcutaneous Fat Loss:   Orbital Fat Pads: Well nourshed (slightly bulging fat pads)  Buccal Fat Pads: Well nourished (full, rounded cheeks)  Triceps: Well nourished (ample fat tissue)  Ribs: Well nourished (full chest, ribs do not protrude)  Muscle Wasting:  Temporalis: Well nourished (well-defined muscle)  Pectoralis (Clavicular Region): Well nourished (clavicle not visible)  Deltoid/Trapezius: Well nourished (rounded appearance at arm, shoulder, neck)  Interosseous: Well nourished (muscle bulges)  Quadriceps: Defer (groin cannula for VA ECMO)  Gastrocnemius: Defer  Edema:  Edema: +1 trace  Edema Location: generalized  Physical Findings:  Hair: Negative  Eyes:  (JOSE)  Mouth:  (JOSE)  Nails: Negative  Skin: Positive (surg sites)    Nutrition Significant Labs:  CBC Trend:   Results from last 7 days   Lab Units 02/21/24  0254 02/20/24  2329 02/20/24  1656 02/20/24  1027   WBC AUTO x10*3/uL 6.8 5.2 8.3 7.3   RBC AUTO x10*6/uL 2.74* 2.44* 2.76* 2.63*   HEMOGLOBIN g/dL 7.6* 6.8* 7.8* 7.5*   HEMATOCRIT % 22.4* 19.6* 22.6* 21.8*   MCV fL 82 80 82 83   PLATELETS AUTO x10*3/uL 58* 65* 89* 98*    , A1C:  Lab Results   Component Value Date    HGBA1C 6.3 (H) 02/16/2024   , BG POCT trend:   Results from last 7 days   Lab Units 02/21/24  0817 02/20/24  2016 02/20/24  1706 02/20/24  0909 02/20/24  0557   POCT GLUCOSE mg/dL 190* 156* 160* 203* 208*    , Liver Function Trend:   Results from last 7 days   Lab Units 02/20/24  2329 02/19/24  2346 02/19/24  0529 02/18/24  2317   ALK PHOS U/L 50 57 65 55   AST U/L 109* 272* 245* 220*   ALT  U/L 100* 316* 318* 311*   BILIRUBIN TOTAL mg/dL 0.7 1.0 1.1 1.0    , Renal Lab Trend:   Results from last 7 days   Lab Units 02/21/24  0803 02/21/24  0246 02/20/24 2329 02/20/24 2005   POTASSIUM mmol/L 4.4 4.8 4.8 4.8   PHOSPHORUS mg/dL 3.8 5.5* 4.9 5.9*   SODIUM mmol/L 127* 126*  126* 126*  126* 126*  126*   MAGNESIUM mg/dL  --  2.43* 2.42* 2.62*   EGFR mL/min/1.73m*2 17* 16* 14* 12*   BUN mg/dL 50* 57* 61* 65*   CREATININE mg/dL 3.46* 3.78* 4.11* 4.60*    , Lipid Panel:   Lab Results   Component Value Date    CHOL 162 02/16/2024    HDL 39.2 02/16/2024    CHHDL 4.1 02/16/2024    LDLF 184 (H) 07/18/2023    VLDL 28 02/16/2024    TRIG 142 02/16/2024    , Vit D:   Lab Results   Component Value Date    VITD25 92 01/04/2023        Nutrition Specific Medications:  Scheduled medications  acetaminophen, 650 mg, oral, q6h  [Held by provider] aspirin, 81 mg, oral, Daily  calcitriol, 0.5 mcg, oral, Daily  calcium carbonate, 1,250 mg, oral, BID with meals  esomeprazole, 40 mg, nasogastric tube, BID  ferrous sulfate, 60 mg of iron, oral, Daily  insulin lispro, 0-10 Units, subcutaneous, q4h  insulin NPH (Isophane), 12 Units, subcutaneous, Daily  levothyroxine, 200 mcg, nasogastric tube, Daily  multivitamin with iron-minerals, 15 mL, oral, Daily  nystatin, 5 mL, Swish & Swallow, q6h  perflutren lipid microspheres, 0.5-10 mL of dilution, intravenous, Once in imaging  perflutren protein A microsphere, 0.5 mL, intravenous, Once in imaging  polyethylene glycol, 17 g, oral, BID  [START ON 2/23/2024] predniSONE, 10 mg, oral, Once  predniSONE, 30 mg, oral, Daily   Followed by  [START ON 2/22/2024] predniSONE, 20 mg, oral, Daily  rocuronium, , ,   [Held by provider] rosuvastatin, 40 mg, oral, Nightly  sirolimus, 1 mg, oral, Daily  sulfamethoxazole-trimethoprim, 80 mg of trimethoprim, oral, Daily  sulfur hexafluoride microsphr, 2 mL, intravenous, Once in imaging  [START ON 2/22/2024] tacrolimus, 1.5 mg, oral, q12h TRICIA  [START ON  2/22/2024] valGANciclovir, 450 mg, oral, q48h      Continuous medications  dextrose 5 % in water (D5W), 125 mL/hr, Last Rate: 125 mL/hr (02/21/24 0900)  EPINEPHrine, 0.04 mcg/kg/min (Dosing Weight), Last Rate: 0.04 mcg/kg/min (02/21/24 1100)  lactated Ringer's, 5 mL/hr, Last Rate: 5 mL/hr (02/21/24 0700)  lactated Ringer's, 10 mL/hr, Last Rate: Stopped (02/20/24 1715)  norepinephrine, 0.01-0.5 mcg/kg/min (Dosing Weight), Last Rate: Stopped (02/21/24 1125)  PrismaSol 4/2.5, 2,400 mL/hr, Last Rate: 2,400 mL/hr (02/21/24 1012)  propofol, 5-50 mcg/kg/min (Dosing Weight), Last Rate: 34.973 mcg/kg/min (02/21/24 1100)      I/O:   Last BM Date: 02/19/24; Stool Appearance: Loose (02/19/24 1330)        Dietary Orders (From admission, onward)       Start     Ordered    02/22/24 0001  NPO Diet; Effective midnight  Diet effective midnight         02/21/24 0809                     Estimated Needs:   Total Energy Estimated Needs (kCal):  (0187-5663)  Method for Estimating Needs: 12-14 kcals/kg of actual BW given critiically ill, BMI btwn 30-50 per aspen guidelines  Total Protein Estimated Needs (g): 95 g (+)  Method for Estimating Needs: 2.0 gm/kg of ideal BW per aspen guidelines  Total Fluid Estimated Needs (mL):  (per team)           Nutrition Diagnosis   Malnutrition Diagnosis  Patient has Malnutrition Diagnosis: No (based on available data)    Nutrition Diagnosis  Patient has Nutrition Diagnosis: Yes  Diagnosis Status (1): New  Nutrition Diagnosis 1: Increased nutrient needs (protein)  Related to (1): hypercatabolism/anabolic resistance associated with critical illness  As Evidenced by (1): intubated/sedated, ECMO & CVVH  Additional Nutrition Diagnosis: Diagnosis 2  Diagnosis Status (2): New  Nutrition Diagnosis 2: Unintended weight gain  Related to (2): acute on chronic illness  As Evidenced by (2): up 41# since end of 3/29/23       Nutrition Interventions/Recommendations   Pivot 1.5 goal @ 25 mls/hr continuous + one packet  Prostat AWC per day while sedated on propofol    Off propofol, increase TF goal @ 35mls/hr + continue daily Prostat AWC        Nutrition Prescription:  Individualized Nutrition Prescription Provided for : TF @ 25 + prostat AWC with propofol = 1400 kcals, 73 gm protein ;  TF @ 35 + prostat AWC = 1360 kcals, 96 gm protein        Nutrition Interventions:   Interventions: Enteral intake  Enteral Intake: Modify rate of enteral nutrition  Goal: intiate & advance TF to rec'd goal rate    Collaboration and Referral of Nutrition Care: Team meeting involving nutrition professional    Nutrition Education: n/a         Nutrition Monitoring and Evaluation   Food/Nutrient Related History Monitoring  Monitoring and Evaluation Plan: Enteral and parenteral nutrition intake  Enteral and Parenteral Nutrition Intake: Enteral nutrition intake  Criteria: TF tolerence  Additional Plans: TF goal rate acheived within 48 hours              Nutrition Focused Physical Findings  Monitoring and Evaluation Plan: Muscles, Adipose  Criteria: minimize loss  Criteria: minimize loss       Time Spent/Follow-up Reminder:   Time Spent (min): 60 minutes  Last Date of Nutrition Visit: 02/21/24  Nutrition Follow-Up Needed?: Dietitian to reassess per policy  Follow up Comment: former OHT, intubated on ECMO/IABP & CVVH

## 2024-02-21 NOTE — PROGRESS NOTES
"Music Therapy Note    Charla Bowles     Therapy Session  Referral Type: New referral this admission  Visit Type: New visit  Session Start Time: 1342  Session End Time: 1356  Intervention Delivery: In-person  Conflict of Service: None  Family Present for Session: None               Treatment/Interventions  Areas of Focus: Coping, Procedural support, Anxiety reduction  Music Therapy Interventions: Live music listening, Passive musical engagement  Interruption: No  Patient Fell Asleep at End of Session: No    Post-assessment  Total Session Time (min): 14 minutes    Narrative  Assessment Detail: Pt about to begin procedure when she said \"you can start\" to MT who asked pt's preferred music, which is R&B  Plan: MT offered live R&B music to promote relaxation and anxiety reduction  Intervention: Pt took deep breaths as she listened to the music  Evaluation: Pt appeared more calm post-session  Follow-up: MT will f/u if pt remains admitted    Education Documentation  No documentation found.          "

## 2024-02-21 NOTE — PROGRESS NOTES
Charla Bowles is a 33 y.o. female on day 6 of admission presenting with Cardiogenic shock (CMS/HCC).    Briefly, the patient is a 33 year old female with past medical history of heart transplant in March 2022 admitted 2/15 with biopsy on 2/16 showing mild ACR.  Sequential escalation of support to IABP (2/18) and VA-ECMO (2/19).     ECMO NOTE:    Patient requires ECMO support. Drainage cannula site, cannula, pump, oxygenator, return cannula and return cannula site clinically inspected. RPM and sweep reviewed and adjusted appropriate to clinical context. Anticoagulation status and intensity discussed. Plan for weaning, next clinical steps discussed with team and family. Discussed with cardiothoracic surgeon, perfusion, palliative care and physical/occupational therapy    ECMO Indication: CS  ECMO Cannula Configuration: fem-fem  ECMO circuit run from drainage cannula to pump to oxygenator to return cannula: y  Presence of cannula bleeding: n  Presence of oxygenator clot: n  Pre/post PaO2 adequate: y  LV Unloading/Pulse Pressure: iabp  Distal Perfusion: adequate  Anticoagulation Plan/Monitoring: heparin held due to worsening tpenia  Mobility Plan/Candidacy: not candidate  Path to decannulation/anticipated decannulation date:unclear, needs volume optimization and is s/p rejection rx      dextrose 5 % in water (D5W), 125 mL/hr, Last Rate: 125 mL/hr (02/21/24 0900)  EPINEPHrine, 0.04 mcg/kg/min (Dosing Weight), Last Rate: 0.04 mcg/kg/min (02/21/24 1100)  lactated Ringer's, 5 mL/hr, Last Rate: 5 mL/hr (02/21/24 0700)  lactated Ringer's, 10 mL/hr, Last Rate: Stopped (02/20/24 1715)  norepinephrine, 0.01-0.5 mcg/kg/min (Dosing Weight), Last Rate: Stopped (02/21/24 1125)  PrismaSol 4/2.5, 2,400 mL/hr, Last Rate: 2,400 mL/hr (02/21/24 1012)  propofol, 5-50 mcg/kg/min (Dosing Weight), Last Rate: 39.993 mcg/kg/min (02/21/24 1230)          Vent Mode: Volume control/assist control  FiO2 (%):  [40 %] 40 %  S RR:  [12-16]  "12  S VT:  [280 mL-380 mL] 280 mL  PEEP/CPAP (cm H2O):  [10 cm H20-12 cm H20] 12 cm H20  MAP (cm H2O):  [15-16] 16           Objective     Physical Exam    Last Recorded Vitals  Blood pressure 112/57, pulse 95, temperature 37.2 °C (99 °F), temperature source Core, resp. rate 16, height 1.549 m (5' 1\"), weight 99.6 kg (219 lb 9.3 oz), SpO2 99 %.  Intake/Output last 3 Shifts:  I/O last 3 completed shifts:  In: 9182.6 (92.2 mL/kg) [P.O.:1160; I.V.:2637.6 (26.5 mL/kg); Blood:1050; Other:3885; IV Piggyback:450]  Out: 5345 (53.7 mL/kg) [Other:5340; Blood:5]  Weight: 99.6 kg     Assessment/Plan   Principal Problem:    Cardiogenic shock (CMS/HCC)  Active Problems:    Heart transplant recipient (CMS/HCC)    Encounter for aftercare following heart transplant (CMS/HCC)    Heart transplant as cause of abnormal reaction or later complication (CMS/HCC)    Requires ongoing ECMO support for cardiogenic shock. Echo results noted. Volume off today with close monitoring of Na+. Otherwise continue full support, ECMO, IABP, CRRT.         Quang Mehta MD      "

## 2024-02-21 NOTE — PROGRESS NOTES
2/2 CTICU     OVERVIEW  03/2022 (OHT) Heart Transplant presented to [facility] ED 2/15 for n/v. 2/16 heart biopsy with rejection. 2/18 IABP. 2/19 VA-ECMO & CVVH --> transferred from HF to CTICU. Presently 2/20 on room air. CTICU course complicated by ongoing anemia requiring transfusion requirements, volume overload & 2/21 intubation (tachypnea and increased work of breathing 2/2 pulmonary edema).      DC PLAN  TBD - Care Transitions is following to develop a safe & supportive discharge plan in collaboration with TRANSPLANT & multidisciplinary teams (along with) patient/family/significant others.       PAYOR  [facility]      COMPLETED  (X) Daily ongoing review of patient via chart and/or (M-F) IDT rounds    Claribel Diaz (LSW, MSW)

## 2024-02-21 NOTE — NURSING NOTE
Patient complains of anxiety, dry cough. Lungs sounds rhoncorous. Continues with RR >35. Precedex increased. Provider at bedside.

## 2024-02-21 NOTE — CARE PLAN
The patient's goals for the shift include safety wiyh sedation and ET tube     The clinical goals for the shift include  free from injury and hemodynamically stable    Over the shift, the patient did not make progress toward the following goals. Barriers to progression include intubated/sedated due to hemodynamic instability. Recommendations to address these barriers include assess patient understanding /fluid removal with CVVH.
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
## 2024-02-22 NOTE — PROGRESS NOTES
Charla Bowles is a 33 y.o. female on day 7 of admission presenting with Cardiogenic shock (CMS/HCC).    Briefly, the patient is a 33 year old female with past medical history of heart transplant in March 2022 admitted 2/15 with biopsy on 2/16 showing mild ACR.  Sequential escalation of support to IABP (2/18) and VA-ECMO (2/19). She was intubated early morning 2/21/24 due to pulmonary edema.    ECMO NOTE    Patient requires ECMO support. Drainage cannula site, cannula, pump, oxygenator, return cannula and return cannula site clinically inspected. RPM and sweep reviewed and adjusted appropriate to clinical context. Anticoagulation status and intensity discussed. Plan for weaning, next clinical steps discussed with team and family. Discussed with cardiothoracic surgeon, perfusion, palliative care and physical/occupational therapy    ECMO Indication: CS  ECMO Cannula Configuration: femfem  ECMO circuit run from drainage cannula to pump to oxygenator to return cannula: y  Presence of cannula bleeding: n  Presence of oxygenator clot: n  Pre/post PaO2 adequate: y  LV Unloading/Pulse Pressure: iabp, epi  Distal Perfusion: ok  Anticoagulation Plan/Monitoring: held, low platelets  Mobility Plan/Candidacy: no  Path to decannulation/anticipated decannulation date:unclear      dextrose 5 % in water (D5W), 75 mL/hr, Last Rate: 75 mL/hr (02/22/24 0810)  EPINEPHrine, 0.02 mcg/kg/min (Dosing Weight)  insulin regular infusion for cardiac surgery, 0-15 Units/hr, Last Rate: 5 Units/hr (02/22/24 1100)  lactated Ringer's, 5 mL/hr, Last Rate: 5 mL/hr (02/21/24 2000)  lactated Ringer's, 10 mL/hr, Last Rate: Stopped (02/20/24 1715)  PrismaSol 4/2.5, 2,400 mL/hr, Last Rate: 2,400 mL/hr (02/21/24 1653)  propofol, 5-50 mcg/kg/min (Dosing Weight), Last Rate: 50 mcg/kg/min (02/22/24 0955)          Vent Mode: Pressure support  FiO2 (%):  [40 %] 40 %  S RR:  [12] 12  S VT:  [280 mL-380 mL] 380 mL  PEEP/CPAP (cm H2O):  [10 cm H20-12 cm H20]  "10 cm H20  MN SUP:  [10 cm H20] 10 cm H20  MAP (cm H2O):  [12-15] 15           Objective     Physical Exam    Last Recorded Vitals  Blood pressure 112/57, pulse (!) 117, temperature 36.7 °C (98.1 °F), temperature source Core, resp. rate 17, height 1.549 m (5' 0.98\"), weight 99.6 kg (219 lb 9.3 oz), SpO2 100 %.  Intake/Output last 3 Shifts:  I/O last 3 completed shifts:  In: 8326.9 (83.6 mL/kg) [P.O.:120; I.V.:6796.9 (68.2 mL/kg); Blood:700; NG/GT:560; IV Piggyback:150]  Out: 8735 (87.7 mL/kg) [Urine:400 (0.1 mL/kg/hr); Other:8335]  Weight: 99.6 kg           Assessment/Plan   Principal Problem:    Cardiogenic shock (CMS/HCC)  Active Problems:    Heart transplant recipient (CMS/HCC)    Encounter for aftercare following heart transplant (CMS/HCC)    Heart transplant as cause of abnormal reaction or later complication (CMS/HCC)    Continue full ecmo support, iabp, and volume removal via CRRT. Will re-echo in next 72 hours following fluid removal. Reviewed at bedside with HF/Tx team and family.           Quang Mehta MD      "

## 2024-02-22 NOTE — CONSULTS
Vascular Surgery Consult Note    HPI:  33F PMHx OHT March 2022 admitted with cardiogenic shock related to allograft rejection. Currently on ECMO and Impella support (ECMO VV L groin, R CFA IABP) placed 2/19. Vascular Surgery consulted for change in LE vascular exam with loss of dopplerable signals LLE.    Currently on ECMO at 3.6L/min, FiO2 100%, 0.03 epi  Bedside nursing unable to get pedal signals LLE. Has functioning distal reperfusion cannula    PMH/PSH: OHT as above, gastric bypass, hypothyroid, prior DVT periop during recovery inpatient from OHT  Meds: Reviewed  Allergies:   Allergies   Allergen Reactions    Mycophenolate Mofetil Rash, Anaphylaxis and Other    Dapagliflozin GI bleeding and Bleeding     UTI    Urinary tract infection    Empagliflozin Unknown    Topiramate Nausea Only and Nausea/vomiting    Latex Rash     Objective:  Vitals:  Vitals:    02/22/24 1700   BP:    Pulse: (!) 117   Resp: 17   Temp:    SpO2:         Exam:  AA female, lying in bed. Intubated and sedated  Sinus tachy on monitor  Satting well on current ECMO and vent settings  Abdomen soft, nontender  R groin with IABP in place, L groin with venous and arterial cannulae in place. Bilateral LE warm to knees, with gradient coolness to foot where she is cool bilaterally.   Multiphasic popliteal and PT signal RLE, present but sluggish pop signals LLE without dopplerable pedal signals    I&Os:    Intake/Output Summary (Last 24 hours) at 2/22/2024 1726  Last data filed at 2/22/2024 1700  Gross per 24 hour   Intake 3975.05 ml   Output 9359 ml   Net -5383.95 ml        Labs:  Lab Results   Component Value Date    WBC 10.9 02/22/2024    WBC 9.2 02/22/2024    WBC 10.0 02/21/2024    HGB 9.0 (L) 02/22/2024    HGB 8.2 (L) 02/22/2024    HGB 8.0 (L) 02/21/2024    HCT 25.5 (L) 02/22/2024    HCT 23.3 (L) 02/22/2024    HCT 23.0 (L) 02/21/2024    MCV 79 (L) 02/22/2024    MCV 82 02/22/2024    MCV 81 02/21/2024    PLT 66 (L) 02/22/2024     Lab Results    Component Value Date    CREATININE 2.01 (H) 02/22/2024    CREATININE 2.19 (H) 02/22/2024    CREATININE 2.42 (H) 02/22/2024    BUN 31 (H) 02/22/2024    BUN 32 (H) 02/22/2024    BUN 35 (H) 02/22/2024     (L) 02/22/2024     (L) 02/22/2024     (L) 02/22/2024    K 4.7 02/22/2024    K 5.3 02/22/2024    K 4.6 02/22/2024    CL 97 (L) 02/22/2024    CL 96 (L) 02/22/2024    CL 98 02/22/2024    CO2 20 (L) 02/22/2024    CO2 21 02/22/2024    CO2 22 02/22/2024         Assessment & Plan:  33F PMHx OHT March 2022 admitted with cardiogenic shock related to allograft rejection. Currently on ECMO and Impella support (ECMO VV L groin, R CFA IABP) placed 2/19. Vascular Surgery consulted for change in LE vascular exam with loss of dopplerable signals LLE    Cardiogenic shock related to allograft rejection  Exam consistent with underperfusion of LLE related to large bore device in L CFA; however does have signal distal to devices in popliteal artery. LLE not frankly cold, however cool and without detectable signals.    Plan:  No urgent vascular surgical intervention  Wean ECMO and remove cannulae as soon as feasible  Would recommend interrogation of antegrade perfusion sheath to ensure it is flowing and functioning  Warm b/l LE feet with Susie Hugger  Maintain anticoagulation with ECMO and impella functioning    Discussed and seen with Dr. Emory Subramanian MD  PGY5 - Integrated Vascular Surgery

## 2024-02-22 NOTE — PROGRESS NOTES
"Charla Bowles is a 33 y.o. female on day 6 of admission presenting with Cardiogenic shock (CMS/HCC).    Subjective   Pt transferred to CTICU to start ECMO last night. Pt remains on prednisone taper and was started on continuous TF at trickle rate.     I have reviewed histories, allergies and medications have been reviewed and there are no changes       Objective   Review of Systems   Reason unable to perform ROS: sedated.     from 2/19, pt in procedure during rounds  Physical Exam  Constitutional:       Appearance: She is obese. She is ill-appearing.   HENT:      Head: Normocephalic and atraumatic.      Nose: Nose normal.      Mouth/Throat:      Mouth: Mucous membranes are dry.      Pharynx: Oropharynx is clear.   Eyes:      Extraocular Movements: Extraocular movements intact.      Conjunctiva/sclera: Conjunctivae normal.   Neck:      Comments: Catheter in place on R side  Cardiovascular:      Rate and Rhythm: Regular rhythm. Tachycardia present.   Pulmonary:      Effort: Pulmonary effort is normal.      Breath sounds: Normal breath sounds.   Abdominal:      General: Abdomen is flat. Bowel sounds are normal.      Palpations: Abdomen is soft.   Musculoskeletal:      Right lower leg: Edema present.      Left lower leg: Edema present.   Skin:     General: Skin is warm and dry.   Neurological:      Comments: Unable to assess   Psychiatric:      Comments: Unable to assess         Last Recorded Vitals  Blood pressure 112/57, pulse (!) 117, temperature 36.7 °C (98.1 °F), temperature source Core, resp. rate 12, height 1.549 m (5' 0.98\"), weight 99.6 kg (219 lb 9.3 oz), SpO2 100 %.  Intake/Output last 3 Shifts:  I/O last 3 completed shifts:  In: 9935.9 (99.8 mL/kg) [P.O.:320; I.V.:4610.9 (46.3 mL/kg); Blood:700; Other:3885; NG/GT:220; IV Piggyback:200]  Out: 8497 (85.3 mL/kg) [Other:8492; Blood:5]  Weight: 99.6 kg     Relevant Results  Results from last 7 days   Lab Units 02/21/24  1806 02/21/24  1248 02/21/24  1238 " 02/21/24  0817 02/21/24  0803 02/21/24  0246 02/20/24  2329 02/20/24 2016 02/20/24 2005 02/20/24  1706   POCT GLUCOSE mg/dL 284* 253*  --  190*  --   --   --  156*  --  160*   GLUCOSE mg/dL  --   --  233*  --  201* 200* 173*  --  166*  --      Lab Review  Lab Results   Component Value Date    BILITOT 0.7 02/20/2024    CALCIUM 7.7 (L) 02/21/2024    CO2 20 (L) 02/21/2024    CL 96 (L) 02/21/2024    CREATININE 3.42 (H) 02/21/2024    GLUCOSE 233 (H) 02/21/2024    ALKPHOS 50 02/20/2024    K 4.3 02/21/2024    PROT 4.9 (L) 02/20/2024     (L) 02/21/2024     (H) 02/20/2024     (H) 02/20/2024    BUN 46 (H) 02/21/2024    ANIONGAP 16 02/21/2024    MG 2.51 (H) 02/21/2024    PHOS 3.3 02/21/2024     (H) 02/20/2024    ALBUMIN 2.7 (L) 02/21/2024    LIPASE 8 (L) 03/17/2023    GFRF 74 09/27/2023     Lab Results   Component Value Date    TRIG 142 02/16/2024    CHOL 162 02/16/2024    LDLCALC 94 02/16/2024    HDL 39.2 02/16/2024     Lab Results   Component Value Date    HGBA1C 6.3 (H) 02/16/2024    HGBA1C 5.7 (A) 03/17/2023    HGBA1C 6.4 (A) 12/14/2022     The ASCVD Risk score (Toño CARSON, et al., 2019) failed to calculate for the following reasons:    The 2019 ASCVD risk score is only valid for ages 40 to 79  Scheduled medications  acetaminophen, 650 mg, oral, q6h  [Held by provider] aspirin, 81 mg, oral, Daily  calcitriol, 0.5 mcg, oral, Daily  esomeprazole, 40 mg, nasogastric tube, BID  ferrous sulfate, 60 mg of iron, oral, Daily  [Held by provider] insulin lispro, 0-15 Units, subcutaneous, q6h  [Held by provider] insulin NPH (Isophane), 12 Units, subcutaneous, Daily  insulin regular, 0-15 Units, intravenous, Once  levothyroxine, 200 mcg, nasogastric tube, Daily  multivitamin with iron-minerals, 15 mL, oral, Daily  nystatin, 5 mL, Swish & Swallow, q6h  perflutren lipid microspheres, 0.5-10 mL of dilution, intravenous, Once in imaging  perflutren protein A microsphere, 0.5 mL, intravenous, Once in  imaging  polyethylene glycol, 17 g, oral, BID  [START ON 2/23/2024] predniSONE, 10 mg, oral, Once  [START ON 2/22/2024] predniSONE, 20 mg, oral, Daily  [Held by provider] rosuvastatin, 40 mg, oral, Nightly  sirolimus, 1 mg, oral, Daily  sulfamethoxazole-trimethoprim, 80 mg of trimethoprim, oral, Daily  sulfur hexafluoride microsphr, 2 mL, intravenous, Once in imaging  [START ON 2/22/2024] tacrolimus, 1.5 mg, oral, q12h TRICIA  [START ON 2/22/2024] valGANciclovir, 450 mg, oral, q48h      Continuous medications  dextrose 5 % in water (D5W), 125 mL/hr, Last Rate: 125 mL/hr (02/21/24 1411)  EPINEPHrine, 0.04 mcg/kg/min (Dosing Weight), Last Rate: 0.04 mcg/kg/min (02/21/24 1100)  insulin regular infusion for cardiac surgery, 0-15 Units/hr  lactated Ringer's, 5 mL/hr, Last Rate: 5 mL/hr (02/21/24 0700)  lactated Ringer's, 10 mL/hr, Last Rate: Stopped (02/20/24 1715)  norepinephrine, 0.01-0.5 mcg/kg/min (Dosing Weight), Last Rate: Stopped (02/21/24 1125)  PrismaSol 4/2.5, 2,400 mL/hr, Last Rate: 2,400 mL/hr (02/21/24 1653)  propofol, 5-50 mcg/kg/min (Dosing Weight), Last Rate: 39.993 mcg/kg/min (02/21/24 1848)      PRN medications  PRN medications: calcium gluconate, calcium gluconate, dextrose 10 % in water (D10W), dextrose, dextrose **OR** glucagon, diphenhydrAMINE, glucagon, HYDROmorphone, HYDROmorphone, insulin regular, lactulose, magnesium sulfate, magnesium sulfate, oxygen, potassium chloride CR **OR** potassium chloride, potassium chloride, promethazine        Assessment/Plan   Principal Problem:    Cardiogenic shock (CMS/HCC)  Active Problems:    Heart transplant recipient (CMS/HCC)    Encounter for aftercare following heart transplant (CMS/HCC)    Heart transplant as cause of abnormal reaction or later complication (CMS/HCC)    PreDM2 with solumedrol induced hyperglycemia in setting of heart allograft rejection     History Of Present Illness  Charla Bowles is a 33 y.o. female presenting with PMHx of stage D  HFrEF (PPCM) s/p ICD s/p CardioMEMs, hypothyroidism 2/2 thyroidectomy on replacement therapy, obesity s/p gastric bypass (2017), and SLE who is s/p OHT (3/31/2022) with a post-OHT course complicated by RIJ/RUE DVTs, leukopenia, left groin seroma, worsening renal function, asymptomatic 2R ACR (11/2022) treated with steroids, and thrush who presented to [facility] ED for nausea and vomiting. Given patient's severely reduced EF patient was started on a milrinone drip and levophed was also started due to hypotension. CXR was obtained which showed an enlarged cardiac silhouette. Pt. Was admitted to the HFICU for cardiogenic shock and concern for acute rejection.    Resumed home medications and pulse dose steroids x3 days as solumedrol 1g q24. PO steroid taper TBD by heart biopsy results. Likely pred 40-60mg starting.      Diabetes history  -A1C historically remains <6.5% in preDM range   - pt did experience steroid induced hyperglycemia at time of her initial OHT in 3/2022  - on no glucose control meds at home        PLAN:    2/15: start solumedrol 1g pulse q24hr x3 days  2/18: start prednisone 60mg PO  2/19: pred 50mg  2/20: pred 40mg  2/21: pred 30mg  2/22: pred 20mg  2/23: pred 10mg, then stop    - Goal BG <180  - continue NPH to 12u with each dose of AM prednisone  - adjust ssi to lispro insulin corrective scale to #3 q6h   - if glucose >300 mg/dl, please start pt on insulin gtt per CTICU protocol      -Accuchecks q6h  -Hypoglycemia protocol  - pt is NPO and now on continuous TF   -Will continue to follow and titrate insulin accordingly     Dispo:  pt may MDI insulin at discharge to address her prednisone taper - exact dose TBD by titration.  SGLT2i tx would benefit both CHF and CKD once tacrolimus levels are returned to maintenance regimen - likely 4-6 months after this allograft rejection episode.    - pt's  Endocrinologist moved and she will need to re-establish endocrine care for hypothyroidism and follow up on  steroid induced hyperglycemia, appointment requested for post transplant clinic with Elisabeth Acevedo PA-C in [place] [id number] on 4/22/24     I spent 35 minutes in the professional and overall care of this patient.      Elisabeth Acevedo PA-C

## 2024-02-22 NOTE — NURSING NOTE
ICU team updated again that after attempt to warm left lower extremity unable to auscultate a doppler signal in LLE DP or PT. NP alsoat bedside to assess.

## 2024-02-22 NOTE — PROGRESS NOTES
"Charla Bowles is a 33 y.o. female on day 7 of admission presenting with Cardiogenic shock (CMS/HCC).    Briefly, the patient is a 33 year old female with past medical history of heart transplant in March 2022 admitted 2/15 with biopsy on 2/16 showing mild ACR.  Sequential escalation of support to IABP (2/18) and VA-ECMO (2/19). Intubated on 2/21 due to pulmonary edema.      Objective     Physical Exam    Last Recorded Vitals  Blood pressure 112/57, pulse (!) 117, temperature 36.7 °C (98.1 °F), temperature source Core, resp. rate 17, height 1.549 m (5' 0.98\"), weight 99.6 kg (219 lb 9.3 oz), SpO2 100 %.  Intake/Output last 3 Shifts:  I/O last 3 completed shifts:  In: 8326.9 (83.6 mL/kg) [P.O.:120; I.V.:6796.9 (68.2 mL/kg); Blood:700; NG/GT:560; IV Piggyback:150]  Out: 8735 (87.7 mL/kg) [Urine:400 (0.1 mL/kg/hr); Other:8335]  Weight: 99.6 kg     Relevant Results  Results for orders placed or performed during the hospital encounter of 02/15/24 (from the past 24 hour(s))   Calcium, Ionized   Result Value Ref Range    POCT Calcium, Ionized 1.05 (L) 1.1 - 1.33 mmol/L   CBC   Result Value Ref Range    WBC 10.0 4.4 - 11.3 x10*3/uL    nRBC 2.6 (H) 0.0 - 0.0 /100 WBCs    RBC 2.84 (L) 4.00 - 5.20 x10*6/uL    Hemoglobin 8.0 (L) 12.0 - 16.0 g/dL    Hematocrit 23.0 (L) 36.0 - 46.0 %    MCV 81 80 - 100 fL    MCH 28.2 26.0 - 34.0 pg    MCHC 34.8 32.0 - 36.0 g/dL    RDW 15.1 (H) 11.5 - 14.5 %    Platelets 59 (L) 150 - 450 x10*3/uL   Magnesium   Result Value Ref Range    Magnesium 2.51 (H) 1.60 - 2.40 mg/dL   Renal function panel   Result Value Ref Range    Glucose 233 (H) 74 - 99 mg/dL    Sodium 128 (L) 136 - 145 mmol/L    Potassium 4.3 3.5 - 5.3 mmol/L    Chloride 96 (L) 98 - 107 mmol/L    Bicarbonate 20 (L) 21 - 32 mmol/L    Anion Gap 16 10 - 20 mmol/L    Urea Nitrogen 46 (H) 6 - 23 mg/dL    Creatinine 3.42 (H) 0.50 - 1.05 mg/dL    eGFR 18 (L) >60 mL/min/1.73m*2    Calcium 7.7 (L) 8.6 - 10.6 mg/dL    Phosphorus 3.3 2.5 - " 4.9 mg/dL    Albumin 2.7 (L) 3.4 - 5.0 g/dL   Blood Gas Arterial Full Panel   Result Value Ref Range    POCT pH, Arterial 7.50 (H) 7.38 - 7.42 pH    POCT pCO2, Arterial 26 (L) 38 - 42 mm Hg    POCT pO2, Arterial 242 (H) 85 - 95 mm Hg    POCT SO2, Arterial 100 94 - 100 %    POCT Oxy Hemoglobin, Arterial 97.6 94.0 - 98.0 %    POCT Hematocrit Calculated, Arterial 28.0 (L) 36.0 - 46.0 %    POCT Sodium, Arterial 126 (L) 136 - 145 mmol/L    POCT Potassium, Arterial 4.4 3.5 - 5.3 mmol/L    POCT Chloride, Arterial 97 (L) 98 - 107 mmol/L    POCT Ionized Calcium, Arterial 1.07 (L) 1.10 - 1.33 mmol/L    POCT Glucose, Arterial 238 (H) 74 - 99 mg/dL    POCT Lactate, Arterial 1.6 0.4 - 2.0 mmol/L    POCT Base Excess, Arterial -2.1 (L) -2.0 - 3.0 mmol/L    POCT HCO3 Calculated, Arterial 20.3 (L) 22.0 - 26.0 mmol/L    POCT Hemoglobin, Arterial 9.4 (L) 12.0 - 16.0 g/dL    POCT Anion Gap, Arterial 13 10 - 25 mmo/L    Patient Temperature 37.0 degrees Celsius    FiO2 40 %   POCT GLUCOSE   Result Value Ref Range    POCT Glucose 253 (H) 74 - 99 mg/dL   Blood Gas Arterial Full Panel   Result Value Ref Range    POCT pH, Arterial 7.45 (H) 7.38 - 7.42 pH    POCT pCO2, Arterial 31 (L) 38 - 42 mm Hg    POCT pO2, Arterial 201 (H) 85 - 95 mm Hg    POCT SO2, Arterial 100 94 - 100 %    POCT Oxy Hemoglobin, Arterial 97.7 94.0 - 98.0 %    POCT Hematocrit Calculated, Arterial 25.0 (L) 36.0 - 46.0 %    POCT Sodium, Arterial 126 (L) 136 - 145 mmol/L    POCT Potassium, Arterial 4.7 3.5 - 5.3 mmol/L    POCT Chloride, Arterial 98 98 - 107 mmol/L    POCT Ionized Calcium, Arterial 1.05 (L) 1.10 - 1.33 mmol/L    POCT Glucose, Arterial 272 (H) 74 - 99 mg/dL    POCT Lactate, Arterial 0.9 0.4 - 2.0 mmol/L    POCT Base Excess, Arterial -2.1 (L) -2.0 - 3.0 mmol/L    POCT HCO3 Calculated, Arterial 21.5 (L) 22.0 - 26.0 mmol/L    POCT Hemoglobin, Arterial 8.2 (L) 12.0 - 16.0 g/dL    POCT Anion Gap, Arterial 11 10 - 25 mmo/L    Patient Temperature 37.0 degrees  Celsius    FiO2 40 %   Blood Gas Arterial Full Panel   Result Value Ref Range    POCT pH, Arterial 7.42 7.38 - 7.42 pH    POCT pCO2, Arterial 33 (L) 38 - 42 mm Hg    POCT pO2, Arterial 207 (H) 85 - 95 mm Hg    POCT SO2, Arterial 100 94 - 100 %    POCT Oxy Hemoglobin, Arterial 97.4 94.0 - 98.0 %    POCT Hematocrit Calculated, Arterial 27.0 (L) 36.0 - 46.0 %    POCT Sodium, Arterial 124 (L) 136 - 145 mmol/L    POCT Potassium, Arterial 4.9 3.5 - 5.3 mmol/L    POCT Chloride, Arterial 97 (L) 98 - 107 mmol/L    POCT Ionized Calcium, Arterial 1.09 (L) 1.10 - 1.33 mmol/L    POCT Glucose, Arterial 309 (H) 74 - 99 mg/dL    POCT Lactate, Arterial 1.0 0.4 - 2.0 mmol/L    POCT Base Excess, Arterial -2.6 (L) -2.0 - 3.0 mmol/L    POCT HCO3 Calculated, Arterial 21.4 (L) 22.0 - 26.0 mmol/L    POCT Hemoglobin, Arterial 8.9 (L) 12.0 - 16.0 g/dL    POCT Anion Gap, Arterial 11 10 - 25 mmo/L    Patient Temperature 37.0 degrees Celsius    FiO2 40 %   POCT GLUCOSE   Result Value Ref Range    POCT Glucose 284 (H) 74 - 99 mg/dL   Sodium   Result Value Ref Range    Sodium 127 (L) 136 - 145 mmol/L   Blood Gas Arterial Full Panel   Result Value Ref Range    POCT pH, Arterial 7.43 (H) 7.38 - 7.42 pH    POCT pCO2, Arterial 34 (L) 38 - 42 mm Hg    POCT pO2, Arterial 213 (H) 85 - 95 mm Hg    POCT SO2, Arterial 100 94 - 100 %    POCT Oxy Hemoglobin, Arterial 97.7 94.0 - 98.0 %    POCT Hematocrit Calculated, Arterial 26.0 (L) 36.0 - 46.0 %    POCT Sodium, Arterial 125 (L) 136 - 145 mmol/L    POCT Potassium, Arterial 4.9 3.5 - 5.3 mmol/L    POCT Chloride, Arterial 96 (L) 98 - 107 mmol/L    POCT Ionized Calcium, Arterial 1.09 (L) 1.10 - 1.33 mmol/L    POCT Glucose, Arterial 299 (H) 74 - 99 mg/dL    POCT Lactate, Arterial 1.0 0.4 - 2.0 mmol/L    POCT Base Excess, Arterial -1.4 -2.0 - 3.0 mmol/L    POCT HCO3 Calculated, Arterial 22.6 22.0 - 26.0 mmol/L    POCT Hemoglobin, Arterial 8.7 (L) 12.0 - 16.0 g/dL    POCT Anion Gap, Arterial 11 10 - 25 mmo/L     Patient Temperature 37.0 degrees Celsius    FiO2 140 %   Blood Gas Arterial Full Panel   Result Value Ref Range    POCT pH, Arterial 7.41 7.38 - 7.42 pH    POCT pCO2, Arterial 31 (L) 38 - 42 mm Hg    POCT pO2, Arterial 216 (H) 85 - 95 mm Hg    POCT SO2, Arterial 100 94 - 100 %    POCT Oxy Hemoglobin, Arterial 97.4 94.0 - 98.0 %    POCT Hematocrit Calculated, Arterial 24.0 (L) 36.0 - 46.0 %    POCT Sodium, Arterial 130 (L) 136 - 145 mmol/L    POCT Potassium, Arterial 4.3 3.5 - 5.3 mmol/L    POCT Chloride, Arterial 100 98 - 107 mmol/L    POCT Ionized Calcium, Arterial 1.20 1.10 - 1.33 mmol/L    POCT Glucose, Arterial 199 (H) 74 - 99 mg/dL    POCT Lactate, Arterial 1.9 0.4 - 2.0 mmol/L    POCT Base Excess, Arterial -4.4 (L) -2.0 - 3.0 mmol/L    POCT HCO3 Calculated, Arterial 19.6 (L) 22.0 - 26.0 mmol/L    POCT Hemoglobin, Arterial 8.1 (L) 12.0 - 16.0 g/dL    POCT Anion Gap, Arterial 15 10 - 25 mmo/L    Patient Temperature 37.0 degrees Celsius    FiO2 140 %   Sodium   Result Value Ref Range    Sodium 129 (L) 136 - 145 mmol/L   Hepatic function panel   Result Value Ref Range    Albumin 3.0 (L) 3.4 - 5.0 g/dL    Bilirubin, Total 0.5 0.0 - 1.2 mg/dL    Bilirubin, Direct 0.3 0.0 - 0.3 mg/dL    Alkaline Phosphatase 65 33 - 110 U/L     (H) 7 - 45 U/L     (H) 9 - 39 U/L    Total Protein 5.6 (L) 6.4 - 8.2 g/dL   Lactate Dehydrogenase   Result Value Ref Range     (H) 84 - 246 U/L   CBC   Result Value Ref Range    WBC 9.2 4.4 - 11.3 x10*3/uL    nRBC 5.3 (H) 0.0 - 0.0 /100 WBCs    RBC 2.84 (L) 4.00 - 5.20 x10*6/uL    Hemoglobin 8.2 (L) 12.0 - 16.0 g/dL    Hematocrit 23.3 (L) 36.0 - 46.0 %    MCV 82 80 - 100 fL    MCH 28.9 26.0 - 34.0 pg    MCHC 35.2 32.0 - 36.0 g/dL    RDW 15.6 (H) 11.5 - 14.5 %    Platelets 57 (L) 150 - 450 x10*3/uL   Basic Metabolic Panel   Result Value Ref Range    Glucose 92 74 - 99 mg/dL    Sodium 129 (L) 136 - 145 mmol/L    Potassium 4.6 3.5 - 5.3 mmol/L    Chloride 98 98 - 107 mmol/L     Bicarbonate 22 21 - 32 mmol/L    Anion Gap 14 10 - 20 mmol/L    Urea Nitrogen 35 (H) 6 - 23 mg/dL    Creatinine 2.42 (H) 0.50 - 1.05 mg/dL    eGFR 27 (L) >60 mL/min/1.73m*2    Calcium 8.2 (L) 8.6 - 10.6 mg/dL   Phosphorus   Result Value Ref Range    Phosphorus 3.7 2.5 - 4.9 mg/dL   ECMO Venous Blood Gas   Result Value Ref Range    POCT pH, Venous ECMO 7.21 Reference range not established pH    POCT pCO2, Venous ECMO 63 Reference range not established mm Hg    POCT pO2,  Venous  ECMO 49 Reference range not established mm Hg    POCT SO2, Venous ECMO 73 Reference range not established %    POCT Oxy Hemoglobin, Venous ECMO 71.9 Reference range not established %    POCT Base Excess, Venous ECMO -3.9 Reference range not established mmol/L    POCT HCO3 Calculated, Venous ECMO 25.2 Reference range not established mmol/L    Patient Temperature 37.0 degrees Celsius    FiO2 100 %   Blood Gas Arterial Full Panel   Result Value Ref Range    POCT pH, Arterial 7.41 7.38 - 7.42 pH    POCT pCO2, Arterial 36 (L) 38 - 42 mm Hg    POCT pO2, Arterial 198 (H) 85 - 95 mm Hg    POCT SO2, Arterial 100 94 - 100 %    POCT Oxy Hemoglobin, Arterial 98.2 (H) 94.0 - 98.0 %    POCT Hematocrit Calculated, Arterial 25.0 (L) 36.0 - 46.0 %    POCT Sodium, Arterial 126 (L) 136 - 145 mmol/L    POCT Potassium, Arterial 4.7 3.5 - 5.3 mmol/L    POCT Chloride, Arterial 98 98 - 107 mmol/L    POCT Ionized Calcium, Arterial 1.18 1.10 - 1.33 mmol/L    POCT Glucose, Arterial 91 74 - 99 mg/dL    POCT Lactate, Arterial 1.3 0.4 - 2.0 mmol/L    POCT Base Excess, Arterial -1.6 -2.0 - 3.0 mmol/L    POCT HCO3 Calculated, Arterial 22.8 22.0 - 26.0 mmol/L    POCT Hemoglobin, Arterial 8.3 (L) 12.0 - 16.0 g/dL    POCT Anion Gap, Arterial 10 10 - 25 mmo/L    Patient Temperature 37.0 degrees Celsius    FiO2 40 %   BLOOD GAS MIXED VENOUS FULL PANEL   Result Value Ref Range    POCT pH, Mixed 7.30 (L) 7.33 - 7.43 pH    POCT pCO2, Mixed 48 41 - 51 mm Hg    POCT pO2, Mixed 53 (H)  35 - 45 mm Hg    POCT SO2, Mixed 75 45 - 75 %    POCT Oxy Hemoglobin, Mixed 73.1 45.0 - 75.0 %    POCT Hematocrit Calculated, Mixed 25.0 (L) 36.0 - 46.0 %    POCT Sodium, Mixed 128 (L) 136 - 145 mmol/L    POCT Potassium, Mixed 4.5 3.5 - 5.3 mmol/L    POCT Chloride, Mixed 99 98 - 107 mmol/L    POCT Ionized Calcium, Mixed 1.16 1.10 - 1.33 mmol/L    POCT Glucose, Mixed 85 74 - 99 mg/dL    POCT Lactate, Mixed 1.0 0.4 - 2.0 mmol/L    POCT Base Excess, Mixed -2.8 (L) -2.0 - 3.0 mmol/L    POCT HCO3 Calculated, Mixed 23.6 22.0 - 26.0 mmol/L    POCT Hemoglobin, Mixed 8.3 (L) 12.0 - 16.0 g/dL    POCT Anion Gap, Mixed 10 10 - 25 mmo/L    Patient Temperature 37.0 degrees Celsius    FiO2 40 %   Sodium   Result Value Ref Range    Sodium 127 (L) 136 - 145 mmol/L   Tacrolimus level   Result Value Ref Range    Tacrolimus  4.4 <=15.0 ng/mL   Heparin Assay   Result Value Ref Range    Heparin Unfractionated 0.2 See Comment Below for Therapeutic Ranges IU/mL   POCT GLUCOSE   Result Value Ref Range    POCT Glucose 186 (H) 74 - 99 mg/dL   POCT GLUCOSE   Result Value Ref Range    POCT Glucose 141 (H) 74 - 99 mg/dL   Sodium   Result Value Ref Range    Sodium 127 (L) 136 - 145 mmol/L   Renal function panel   Result Value Ref Range    Glucose 207 (H) 74 - 99 mg/dL    Sodium 128 (L) 136 - 145 mmol/L    Potassium 5.3 3.5 - 5.3 mmol/L    Chloride 96 (L) 98 - 107 mmol/L    Bicarbonate 21 21 - 32 mmol/L    Anion Gap 16 10 - 20 mmol/L    Urea Nitrogen 32 (H) 6 - 23 mg/dL    Creatinine 2.19 (H) 0.50 - 1.05 mg/dL    eGFR 30 (L) >60 mL/min/1.73m*2    Calcium 7.9 (L) 8.6 - 10.6 mg/dL    Phosphorus 4.2 2.5 - 4.9 mg/dL    Albumin 3.3 (L) 3.4 - 5.0 g/dL   POCT GLUCOSE   Result Value Ref Range    POCT Glucose 189 (H) 74 - 99 mg/dL     *Note: Due to a large number of results and/or encounters for the requested time period, some results have not been displayed. A complete set of results can be found in Results Review.      This patient has a central  line   Reason for the central line remaining today? Hemodynamic monitoring and Parenteral medication    This patient has a urinary catheter   Reason for the urinary catheter remaining today? critically ill patient who need accurate urinary output measurements    This patient is intubated   Reason for patient to remain intubated today? 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 Holding IV heparin for now  7) Hx of gastric bypass, colitis  8) Hyponatremia   Close monitoring  9) Hyperglycemia   Insulin infusion  10) Disp   CTICU. Maintain lines. Discussed with Heart Tx.    Due to the high probability of life threatening clinical decompensation, the patient required critical care time evaluating and managing this patient.  Critical care time included obtaining a history, examining the patient, ordering and reviewing studies, discussing, developing, and implementing a management plan, evaluating the patient's response to treatment, and discussion with other care team providers. I saw and evaluated the patient myself. I reviewed the NILA's documentation and discussed the patient with the provider. Critical care time was performed exclusive of billable procedures.    Critical Care Time: 40 minutes             Quang Mehta MD

## 2024-02-22 NOTE — NURSING NOTE
ICU attending at bedside and able to view flow to LLE with bedside US - will contact vascular to further assess.

## 2024-02-22 NOTE — PROGRESS NOTES
Subjective Data:  Remains on VA ecmo, with IABP, CRRT, intubated and on vasopressors  IABP 1:1        Objective Data:  Last Recorded Vitals:  Vitals:    02/22/24 0830 02/22/24 0900 02/22/24 1000 02/22/24 1012   BP:       Pulse:  (!) 118 (!) 117    Resp: (!) 35 20 22 17   Temp:   36.7 °C (98.1 °F)    TempSrc:   Core    SpO2:       Weight:       Height:           Last Labs:  CBC - 2/22/2024:  2:00 AM  9.2 8.2 57    23.3      CMP - 2/22/2024: 10:02 AM  7.9 5.6 107 --- 0.5   4.2 3.3 106 65      PTT - 2/21/2024:  2:46 AM  1.3   14.9 133     TROPHS   Date/Time Value Ref Range Status   02/16/2024 01:16  0 - 34 ng/L Final     Comment:     Previous result verified on 2/16/2024 0018 on specimen/case [id number] called with component [facility] for procedure Troponin I, High Sensitivity with value 125 ng/L.   02/15/2024 10:03  0 - 34 ng/L Final   11/10/2022 11:22  0 - 34 ng/L Final     Comment:     .  Less than 99th percentile of normal range cutoff-  Female and children under 18 years old <35 ng/L; Male <54 ng/L: Negative  Repeat testing should be performed if clinically indicated.   .  Female and children under 18 years old  ng/L; Male  ng/L:  Consistent with possible cardiac damage and possible increased clinical   risk. Serial measurements may help to assess extent of myocardial damage.   .  >120 ng/L: Consistent with cardiac damage, increased clinical risk and  myocardial infarction. Serial measurements may help assess extent of   myocardial damage.   .   NOTE: Children less than 1 year old may have higher baseline troponin   levels and results should be interpreted in conjunction with the overall   clinical context.  .  NOTE: Troponin I testing is performed using a different   testing methodology at [facility] than at other   [facility] hospitals. Direct result comparisons should only   be made within the same method.       BNP   Date/Time Value Ref Range Status   02/16/2024 01:16  AM >5,000 0 - 99 pg/mL Final   02/15/2024 10:03 PM >5,000 0 - 99 pg/mL Final     HGBA1C   Date/Time Value Ref Range Status   02/16/2024 01:16 AM 6.3 see below % Final   03/17/2023 08:38 AM 5.7 % Final     Comment:          Diagnosis of Diabetes-Adults   Non-Diabetic: < or = 5.6%   Increased risk for developing diabetes: 5.7-6.4%   Diagnostic of diabetes: > or = 6.5%  .       Monitoring of Diabetes                Age (y)     Therapeutic Goal (%)   Adults:          >18           <7.0   Pediatrics:    13-18           <7.5                   7-12           <8.0                   0- 6            7.5-8.5   American Diabetes Association. Diabetes Care 33(S1), Jan 2010.     12/14/2022 03:41 PM 6.4 % Final     Comment:          Diagnosis of Diabetes-Adults   Non-Diabetic: < or = 5.6%   Increased risk for developing diabetes: 5.7-6.4%   Diagnostic of diabetes: > or = 6.5%  .       Monitoring of Diabetes                Age (y)     Therapeutic Goal (%)   Adults:          >18           <7.0   Pediatrics:    13-18           <7.5                   7-12           <8.0                   0- 6            7.5-8.5   American Diabetes Association. Diabetes Care 33(S1), Jan 2010.       LDLCALC   Date/Time Value Ref Range Status   02/16/2024 01:16 AM 94 <=99 mg/dL Final     Comment:                                 Near   Borderline      AGE      Desirable  Optimal    High     High     Very High     0-19 Y     0 - 109     ---    110-129   >/= 130     ----    20-24 Y     0 - 119     ---    120-159   >/= 160     ----      >24 Y     0 -  99   100-129  130-159   160-189     >/=190       VLDL   Date/Time Value Ref Range Status   02/16/2024 01:16 AM 28 0 - 40 mg/dL Final   07/18/2023 09:24 AM 32 0 - 40 mg/dL Final   03/17/2023 08:38 AM 40 0 - 40 mg/dL Final   11/10/2022 11:22 PM 44 0 - 40 mg/dL Final      Last I/O:  I/O last 3 completed shifts:  In: 8326.9 (83.6 mL/kg) [P.O.:120; I.V.:6796.9 (68.2 mL/kg); Blood:700; NG/GT:560; IV  Piggyback:150]  Out: 8735 (87.7 mL/kg) [Urine:400 (0.1 mL/kg/hr); Other:8335]  Weight: 99.6 kg     Inpatient Medications:  Scheduled medications   Medication Dose Route Frequency    acetaminophen  650 mg oral q6h    [Held by provider] aspirin  81 mg oral Daily    calcitriol  0.5 mcg oral Daily    [START ON 2/23/2024] esomeprazole  40 mg nasogastric tube Daily before breakfast    ferrous sulfate  60 mg of iron oral Daily    [Held by provider] insulin lispro  0-15 Units subcutaneous q6h    insulin regular  0-15 Units intravenous Once    magnesium hydroxide  30 mL oral Once    multivitamin with iron-minerals  15 mL oral Daily    nystatin  5 mL Swish & Swallow q6h    perflutren lipid microspheres  0.5-10 mL of dilution intravenous Once in imaging    perflutren protein A microsphere  0.5 mL intravenous Once in imaging    polyethylene glycol  17 g oral BID    [START ON 2/23/2024] predniSONE  10 mg oral Daily    [Held by provider] rosuvastatin  40 mg oral Nightly    sennosides-docusate sodium  1 tablet oral Daily    sirolimus  1 mg oral Daily    sulfamethoxazole-trimethoprim  80 mg of trimethoprim oral Daily    sulfur hexafluoride microsphr  2 mL intravenous Once in imaging    tacrolimus  1.5 mg oral q12h TRICIA    valGANciclovir  450 mg oral q48h     PRN medications   Medication    calcium gluconate    calcium gluconate    dextrose    dextrose    Or    glucagon    diphenhydrAMINE    glucagon    HYDROmorphone    HYDROmorphone    insulin regular    lactulose    magnesium sulfate    magnesium sulfate    oxygen    potassium chloride CR    Or    potassium chloride    potassium chloride    promethazine     Continuous Medications   Medication Dose Last Rate    dextrose 5 % in water (D5W)  75 mL/hr 75 mL/hr (02/22/24 0810)    EPINEPHrine  0.02 mcg/kg/min (Dosing Weight)      insulin regular infusion for cardiac surgery  0-15 Units/hr 5 Units/hr (02/22/24 1100)    lactated Ringer's  5 mL/hr 5 mL/hr (02/21/24 2000)    lactated  Ringer's  10 mL/hr Stopped (24 1715)    PrismaSol 4/2.5  2,400 mL/hr 2,400 mL/hr (24 1653)    propofol  5-50 mcg/kg/min (Dosing Weight) 50 mcg/kg/min (24 0955)     Physical Exam  Constitutional:       Appearance: She is ill-appearing.   Cardiovascular:      Rate and Rhythm: Tachycardia present.      Pulses:           Dorsalis pedis pulses are detected w/ Doppler on the right side.      Comments: IABP inflate/ deflate auscultated,  Was unable to located L DP, primary team attempting to locate with doppler, R DP faint but detectable with doppler   Pulmonary:      Comments: Intubated, mechanical breath sounds   Musculoskeletal:      Right lower le+ Edema present.      Left lower le+ Edema present.   Skin:     General: Skin is warm.      Comments: Oozing noted left femoral VA access site; right femoral site stable, minimal oozing   Neurological:      Mental Status: She is lethargic.        Assessment/Plan   Charla Bowles is a 33 year old female with past medical history of heart transplant in 2022 with postoperative course complicated by upper extremity/internal jugular DVTs, and asymptomatic 2R rejection in 2022. She was admitted to HFICU on 2/15 with concern for cardiogenic shock secondary to allograft rejection and decompensated heart failure with multiorgan dysfunction including significant elevation of liver enzymes and nonoliguric acute kidney injury. Endomyocardial biopsy on  revealed mild ACR with +CD4s and negative HLAs, however multisystem organ failure persisted with increased inotropic requirements. IABP placed on . Transferred to CTICU on  for VA ECMO cannulation with Dr. Marina for persistent multisystem dysfunction.     : IABP placed in cath lab, [facility] showed right dominant coronary circulation with no significant coronary artery disease, LVEDP 20    : multi-disciplinary review convened; given persistent requirement for pharmacological and  mechanical support, and with the addition of oliguric/anuric renal insufficiency, decision made to transfer to CTICU and initiate VA ECMO    2/20: pending endomyocardial biopsy today for further investigation of allograft dysfunction    2/21: on VA ecmo, with IABP, CRRT, intubated and on vasopressors    Cardiogenic shock 2/2 suspected allograft rejection  S/p OHT 3/2022  - Intra- aortic balloon pump placed on 2/18, VA ECMO cannulation 2/19  - CXR placement satisfactory for today   - Current IABP settings: 1:1, augmentation 118, assisted /85 (97) site with no hematoma/bleeding   - Plan to wean as hemodynamics per primary team     Recommendations:  - daily CXR with IABP  - please maintain strict bedrest while IABP in place  - closely monitor groin insertion site and distal pulses  - May elevate HOB no greater than 30 degrees  - May log roll patient side to side without flexing involved extremity  - Interventional cardiology will continue to follow, will defer primary care to CICU team     Full Code    Jeyson Hi, APRN-CNP, DNP

## 2024-02-22 NOTE — NURSING NOTE
ICU team updated regarding pale cool left foot, unable to obtain doppler signal DP/PT. Heat packs and warm blankets applied to foot. Pt able to move toes, unable to assess for sensation secondary to sedation.

## 2024-02-22 NOTE — CARE PLAN
Heart failure/Transplant Attending Note    In the early hours of 2/21/2024 she was intubated for management of respiratory failure related to pulmonary edema.    Echocardiogram done yesterday showed near standstill of the LV, but this is confounded by ECMO + IABP use for treatment of cardiogenic shock. She is on dialysis for management of HIRAM and anuria.    We have treated her aggressively with PLEX and thymo, and biopsy done yesterday confirmed no evidence of either cellular or vascular rejection. Her DSAs have been negative.    I reviewed her immunosuppression with our transplant pharmacist: continue sirolimus + tacrolimus, prednisone taper per rejection protocol.    Critical care management per CTICU team.    I spoke to her mother and aunt at bedside.    She can't be considered for relisting for heart transplantation while she has multi-organ failure.    We will continue following closely, with hope for improvement with extensive care she is receiving.     This critically ill patient continues to be at-risk for clinically significant deterioration / failure due to the above mentioned dysfunctional, unstable organ systems.  I have personally identified and managed all complex critical care issues to prevent aforementioned clinical deterioration.  Critical care time is spent at bedside and/or the immediate area and has included, but is not limited to, the review of diagnostic tests, labs, radiographs, serial assessments of hemodynamics, respiratory status, ventilatory management, and family updates.  Time spent in procedures and teaching are reported separately.    Critical care time: __40__ minutes     Lexie Wright MD, MPH  Advanced Heart Failure and Transplant Cardiology  [facility]  [facility]

## 2024-02-22 NOTE — NURSING NOTE
Vascular at bedside and able to locate LLE popliteal doppler signal but unable to find signal DP/PT.

## 2024-02-22 NOTE — CARE PLAN
The patient's goals for the shift include remaining free from injury while in restraints.   The clinical goals for the shift include optimization of hemodynamic stability.     Over the shift, the patient did not make progress toward the following goals. Barriers to progression include Invasive Respiratory Support therapies (V-A ECMO & Mechanical Ventilation), Cardiac-Assistive Devices (V-A ECMO& IABP), Continuous Renal Replacement Therapy (CVVH), and [titratable] vasoactive/sedative medication infusions.     Recommendations to address these barriers include Continued Critical Care Support.

## 2024-02-22 NOTE — PROGRESS NOTES
CTICU Progress Note  Charla Bowles/[id number]    Admit Date: 2/15/2024  Hospital Length of Stay: 7   ICU Length of Stay: 2d 15h   CT SURGEON:     SUBJECTIVE:   No acute overnight events. Improving pulmonary edema.     MEDICATIONS  Infusions:  dextrose 5 % in water (D5W), Last Rate: 125 mL/hr (02/21/24 2000)  EPINEPHrine, Last Rate: 0.04 mcg/kg/min (02/21/24 2000)  insulin regular infusion for cardiac surgery, Last Rate: Stopped (02/22/24 0230)  lactated Ringer's, Last Rate: 5 mL/hr (02/21/24 2000)  lactated Ringer's, Last Rate: Stopped (02/20/24 1715)  norepinephrine, Last Rate: Stopped (02/21/24 1125)  PrismaSol 4/2.5, Last Rate: 2,400 mL/hr (02/21/24 1653)  propofol, Last Rate: 25 mcg/kg/min (02/22/24 0630)      Scheduled:  acetaminophen, 650 mg, q6h  [Held by provider] aspirin, 81 mg, Daily  calcitriol, 0.5 mcg, Daily  esomeprazole, 40 mg, BID  ferrous sulfate, 60 mg of iron, Daily  [Held by provider] insulin lispro, 0-15 Units, q6h  [Held by provider] insulin NPH (Isophane), 12 Units, Daily  insulin regular, 0-15 Units, Once  levothyroxine, 200 mcg, Daily  multivitamin with iron-minerals, 15 mL, Daily  nystatin, 5 mL, q6h  perflutren lipid microspheres, 0.5-10 mL of dilution, Once in imaging  perflutren protein A microsphere, 0.5 mL, Once in imaging  polyethylene glycol, 17 g, BID  [START ON 2/23/2024] predniSONE, 10 mg, Once  predniSONE, 20 mg, Daily  [Held by provider] rosuvastatin, 40 mg, Nightly  sirolimus, 1 mg, Daily  sulfamethoxazole-trimethoprim, 80 mg of trimethoprim, Daily  sulfur hexafluoride microsphr, 2 mL, Once in imaging  tacrolimus, 1.5 mg, q12h TRICIA  valGANciclovir, 450 mg, q48h      PRN:  calcium gluconate, 1 g, q6h PRN  calcium gluconate, 2 g, q6h PRN  dextrose 10 % in water (D10W), 0.3 g/kg/hr, Once PRN  dextrose, 25 g, q15 min PRN  dextrose, 25 g, q15 min PRN   Or  glucagon, 1 mg, q15 min PRN  diphenhydrAMINE, 25 mg, q6h PRN  glucagon, 1 mg, q15 min PRN  HYDROmorphone, 0.4 mg, q3h  "PRN  HYDROmorphone, 1 mg, q4h PRN  insulin regular, 0-15 Units, PRN  lactulose, 20 g, Daily PRN  magnesium sulfate, 1 g, q6h PRN  magnesium sulfate, 2 g, q6h PRN  oxygen, , Continuous PRN - O2/gases  potassium chloride CR, 20 mEq, q6h PRN   Or  potassium chloride, 20 mEq, q6h PRN  potassium chloride, 40 mEq, q6h PRN  promethazine, 12.5 mg, q6h PRN        PHYSICAL EXAM:   Visit Vitals  /57   Pulse (!) 115   Temp 36.1 °C (97 °F) (Core)   Resp 24   Ht 1.549 m (5' 0.98\")   Wt 99.6 kg (219 lb 9.3 oz)   SpO2 100%   BMI 41.51 kg/m²   OB Status Hysterectomy   Smoking Status Never   BSA 2.07 m²     Wt Readings from Last 5 Encounters:   02/21/24 99.6 kg (219 lb 9.3 oz)   12/07/23 92.1 kg (203 lb)   12/01/23 93 kg (205 lb)   11/29/23 92.9 kg (204 lb 12.8 oz)   11/09/23 91.3 kg (201 lb 3.2 oz)     INTAKE/OUTPUT:  I/O last 3 completed shifts:  In: 8326.9 (83.6 mL/kg) [P.O.:120; I.V.:6796.9 (68.2 mL/kg); Blood:700; NG/GT:560; IV Piggyback:150]  Out: 7284 (73.1 mL/kg) [Other:7284]  Weight: 99.6 kg          Vent settings:  Vent Mode: Assist control/Volume control plus  FiO2 (%):  [40 %] 40 %  S RR:  [12-16] 12  S VT:  [280 mL-380 mL] 280 mL  PEEP/CPAP (cm H2O):  [12 cm H20] 12 cm H20  MAP (cm H2O):  [12-16] 12    Physical Exam:   - CONSTITUTION: critically ill, young appearing female on ECMO  - NEUROLOGIC: Following commands.  Moving all extremities with no focal deficits  - CARDIOVASCULAR: ST. On VA ECMO left femoral vein and artery with distal perfusion catheter. Right femoral IABP 1:1 with appropriate augmentation.  - RESPIRATORY: Orally intubated on AC/VC.   - GI: Singh with clear, yellow urine.   - : obese, soft.  Active BS.   - EXTREMITIES: warm, non-pitting edema. Well perfused.  Right fem IABP, some bleeding at site.  Left fem VA ECMO, oozing at site, small hematoma at site.   - SKIN: Intact  - PSYCHIATRIC: JOSE    Daily Risk Screen  Intubated: required- inadequate oxygenation and hemodynamic instability  Central " line: required- HD, hemodynamic monitoring, parenteral medications  Singh: n/a    Images:     Invasive Hemodynamics:    Most Recent Range Past 24hrs   BP (Art) 141/76 Arterial Line BP 1  Min: 78/46  Max: 149/84   MAP(Art) 103 mmHg Arterial Line MAP 1 (mmHg)   Min: 58 mmHg  Max: 108 mmHg   RA/CVP   No data recorded   PA 41/29 PAP  Min: 14/10  Max: 41/29   PA(mean) 34 mmHg PAP (Mean)  Min: 12 mmHg  Max: 34 mmHg   CO 5.7 L/min No data recorded   CI 2.9 L/min/m2 No data recorded   Mixed Venous 71.8 % SVO2 (%)  Min: 57.2 %  Max: 75.6 %   SVR  758 (dyne*sec)/cm5 No data recorded     ECMO:     Most Recent Range Past 24hrs   Flow 3.66 Patient Flow (L/min)  Min: 3.6  Max: 3.8   Speed 3300 Circuit Flow (RPM)  Min: 3300  Max: 3300   Sweep 1 Sweep Gas Flow Rate (L/min)  Min: 1  Max: 4        Assessment/Plan   33 year old female with past medical history of heart transplant in March 2022 with postoperative course complicated by upper extremity/internal jugular DVTs, and 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 She was admitted to HFICU on 2/15 with concern for cardiogenic shock secondary to allograft rejection and decompensated heart failure with multiorgan dysfunction including significant elevation of liver enzymes and nonoliguric acute kidney injury. Endomyocardial biopsy on 2/16 revealed mild ACR with +CD4s and negative HLAs, however multisystem organ failure persisted with increased inotropic requirements. IABP placed on 2/18. Transferred to CTICU on 2/19 for VA ECMO cannulation with Dr. Marina for persistent multi-organ system dysfunction. Intubated 2/21 for respiratory failure 2/2 pulmonary edema.      Plan:  NEURO: No history. Neuro intact and CAM negative. RASS -2 on Propofol. Did not tolerate precedex. -->  - Serial neuro and pain assessments  - PRN Tylenol 650mg q6hr (mild)  - continue PO dilaudid 1mg for moderate pain and IV dilaudid 0.4mg for severe pain  - minimize propofol as able  - PT/OT Consult  - CAM ICU  score qshift  - Sleep/wake cycle hygiene     CV: Patient has a history of heart transplant in March of 2022 with TTE on 11/29 with LVEF 60-65%. Admitted for cardiogenic shock with concern for acute cellular rejection s/p IABP placement (R fem) 2/18 and VA ECMO (left fem) 2/19. ECHO 2/20 with persisting severe BiV dysfunction despite negative biopsy 2/20.  Echo 2/20 EF 10%, severely reduced RV, mild AR and moderate MR. Remains on ECMO 3L flow/100%FiO2 /sweep 5.  IABP 1:1. On epinephrine 0.04 mcg/kg/min. End organ perfusion stable. -118.  -->  - Maintain goal MAP 70-90  - Maintain IABP 1:1  - continue full ECMO support while optimizing volume status  - Wean epi to 0.02 mcg/kg/min  - Echo tomorrow or Saturday after diuresis  - Daily ECMO gases, LDH   - Hold daily rosuvastatin 40mg, will resume if LFT normalize  - Hold home amlodipine     PULM: No history of pulmonary disease. Acute respiratory failure due to tachypnea and acute pulmonary edema on CXR with frothy secretion.  Intubated 2/21 due to respiratory distress despite reasonable ABG due to ECMO.  Appropriate plateau pressures. Currently on ACVC 12/280/12/40%. CXR with improving pulmonary edema.  -->  - Daily CXR  - Maintain SPO2 > 92%  - wean sweep for normal pH  - Wean PEEP, SBT today     GI: History of gastric bypass and MMF colitis. Last BM 2/19. Previously on oral diet. Dobhoff in place with TF at goal.  -->  - Continue daily PPI   - Continue calcitriol 0.5mg daily  - Continue multivitamin  - Continue Calcium carbonate 1,250mg BID  - Continue TF at goal, prostat daily  - Continue miralax BID, start sennakot daily  - Milk of mag 1x now      : No history of renal disease, baseline creatinine 1.2-1.3. Oliguric HIRAM likely in setting of cardiorenal physiology. Renal US from 2/19 unremarkable. No response to lasix drip. Hyponatremia, D5W to prevent rapid overcorrection with initiation of CVVH. On CVVH with sodium at 129 this AM. -->  - Bladder scan daily  -  continue CVVH for goal negative 2L+  - Reduce D5W to 75 ml/hr  - RFP BID  - Serum sodium q4hr  - Replete electrolytes per CTICU protocol  - Nephrology following, appreciate recs     ENDO: History of hypothyroidism TSH 12.02, free thyroxine 2.19. A1c: 6.3. Hyperglycemic, on insulin infusion. -->  - Maintain BG <180 with hypoglycemia protocol  - Continue insulin infusion for now with critical illness and changes in regimen. When more stable, will plan to restart NPH 12 U daily with prednisone and SSI #3 q6hrs.   - Continue synthroid 200mcg daily  - Endocrine consulted, appreciate recs     HEME: History of remote DVT. Acute on chronic iron deficiency anemia. Acute blood loss anemia, last received 1U PRBC 2/20. Increased bleeding from ECMO cannulas. Downtrending thrombocytopenia. PF4 pending. -->    - CBC BID  - Hold systemic heparin with bleeding, indication VA ECMO  - follow-up PF4  - hold ASA with thrombocytopenia  - Continue daily ferrous sulfate  - SCDs for DVT prophylaxis  - Last type and screen: 2/20, send in AM     Rejection/prophylaxis: S/p heart transplant 3/31/2022. Induction basiliximab. Donor HCV -, toxo -/-, CMV -/+. Mild ACR with +CD4 and negative HLAs however clinical presentation concern for AMR rejection.  PLEX/IVIG 2/17, 2/20. Thymo 2/18, 2/19. Repeat biopsy 2/20 with no evidence of on going rejection (ACR 0R, pAMR0 and no C3D or C4D)  - Continue scheduled steroid taper, 20 mg today and 10 mg daily thereafter   - Continue tacrolimus 1.5mg BID with goal FK level 3-5  - Continue sirolimus 1mg daily with goal level 7-9  - Plan for no further PLEX/IVIG or thymo  - continue Nystatin suspension 500,000 units q6hr  - continue SS bactrim daily  - continue Valcyte 450mg q48hr     ID: Afebrile, no current indications of infection. Received Zosyn and Vancomycin on 2/15 in ED. Blood cultures from 2/15 negative. -->  - Trend temp q4h     Skin: No active skin issues.  - Preventative Mepilex dressings in place on  sacrum and heels  - Change preventative Mepilex weekly or more frequently as indicated (when moist/soiled)   - Every shift skin assessment per nursing and weekly ICU skin rounds  - Moisture barrier to be applied with christopher care  - Active skin problems addressed with nursing on daily rounds     Proph:  SCDs  PPI  Systemic heparin on hold     G: Line  Right radial arterial line placed 2/16  RIJ introducer with PAC placed 2/16  LIF Trialysis placed 2/17  Right femoral IABP placed 2/18  8F Left femoral reperfusion cannula placed 2/19  Left femoral 17F arterial ECMO cannula placed 2/19  Left femoral 25F venous ECMO cannula placed 2/19     F: Family: Mother updated at bedside    Restraints: Indicated as alternatives have been attempted and have been ineffective; restrain with soft wrist restraints until medical devices discontinued.       Code status: Full Code     I personally spent 65 minutes of critical care time directly and personally managing the patient exclusive of separately billable procedures.     A,B,C,D,E,F,G: reviewed     Discussed with Dr. Mehta.  Dispo: CTICU care for now.    CTICU TEAM PHONE [phone]    JIMMY Cruz-CNP

## 2024-02-22 NOTE — PROGRESS NOTES
Charla Bowles is a 33 y.o. female on day 7 of admission presenting with Cardiogenic shock (CMS/HCC).    Subjective   Patient transitioned from milrinone to epinephrine yesterday due to hypotension. Singh placed for more accurate I/Os as UOP increased. Tolerating volume removal via CVVH. No other acute events.     Objective     Physical Exam  Constitutional:       Appearance: She is ill-appearing.   HENT:      Head: Normocephalic.      Mouth/Throat:      Mouth: Mucous membranes are moist.      Pharynx: Oropharynx is clear. No oropharyngeal exudate or posterior oropharyngeal erythema.   Eyes:      Extraocular Movements: Extraocular movements intact.      Conjunctiva/sclera: Conjunctivae normal.      Pupils: Pupils are equal, round, and reactive to light.   Neck:      Vascular: No JVD.   Cardiovascular:      Rate and Rhythm: Regular rhythm. Tachycardia present.      Pulses: Normal pulses.      Heart sounds: Normal heart sounds. No murmur heard.     No friction rub. No gallop.      Comments: Left femoral VA ECMO flowing ~4L/ FIO2 100% w/ Sweep 4. Right femoral IABP in place set 1:1 and augmenting appropriately.   Pulmonary:      Effort: Pulmonary effort is normal. No accessory muscle usage or retractions.      Breath sounds: Rhonchi present.      Comments: Intubated and breathing comfortably on ventilator. Equal chest rise bilaterally. Thick oral secretions with small amount of blood tinge.   Abdominal:      General: Abdomen is flat. Bowel sounds are normal. There is no distension.      Palpations: Abdomen is soft. There is no mass.      Tenderness: There is no abdominal tenderness. There is no guarding.      Hernia: No hernia is present.      Comments: Dobhoff in right nare   Musculoskeletal:         General: Swelling (Generalized +1-2 edema) present. No tenderness. Normal range of motion.      Cervical back: Full passive range of motion without pain.   Skin:     General: Skin is warm and dry.      Capillary  "Refill: Capillary refill takes less than 2 seconds.      Coloration: Skin is not jaundiced.      Findings: No laceration, rash or wound.      Comments: Left leg cannulation site with mild oozing    Neurological:      General: No focal deficit present.      Mental Status: She is alert.      Comments: Intubated and sedated         Last Recorded Vitals  Blood pressure 112/57, pulse (!) 117, temperature 36.7 °C (98.1 °F), temperature source Core, resp. rate 17, height 1.549 m (5' 0.98\"), weight 99.6 kg (219 lb 9.3 oz), SpO2 100 %.  Intake/Output last 3 Shifts:  I/O last 3 completed shifts:  In: 8326.9 (83.6 mL/kg) [P.O.:120; I.V.:6796.9 (68.2 mL/kg); Blood:700; NG/GT:560; IV Piggyback:150]  Out: 8735 (87.7 mL/kg) [Urine:400 (0.1 mL/kg/hr); Other:8335]  Weight: 99.6 kg     Relevant Results  Scheduled medications  acetaminophen, 650 mg, oral, q6h  [Held by provider] aspirin, 81 mg, oral, Daily  calcitriol, 0.5 mcg, oral, Daily  esomeprazole, 40 mg, nasogastric tube, BID  ferrous sulfate, 60 mg of iron, oral, Daily  [Held by provider] insulin lispro, 0-15 Units, subcutaneous, q6h  insulin regular, 0-15 Units, intravenous, Once  multivitamin with iron-minerals, 15 mL, oral, Daily  nystatin, 5 mL, Swish & Swallow, q6h  perflutren lipid microspheres, 0.5-10 mL of dilution, intravenous, Once in imaging  perflutren protein A microsphere, 0.5 mL, intravenous, Once in imaging  polyethylene glycol, 17 g, oral, BID  [START ON 2/23/2024] predniSONE, 10 mg, oral, Daily  [Held by provider] rosuvastatin, 40 mg, oral, Nightly  sirolimus, 1 mg, oral, Daily  sulfamethoxazole-trimethoprim, 80 mg of trimethoprim, oral, Daily  sulfur hexafluoride microsphr, 2 mL, intravenous, Once in imaging  tacrolimus, 1.5 mg, oral, q12h TRICIA  valGANciclovir, 450 mg, oral, q48h      Continuous medications  dextrose 5 % in water (D5W), 75 mL/hr, Last Rate: 75 mL/hr (02/22/24 0810)  EPINEPHrine, 0.02 mcg/kg/min (Dosing Weight)  insulin regular infusion for " cardiac surgery, 0-15 Units/hr, Last Rate: 5 Units/hr (02/22/24 1100)  lactated Ringer's, 5 mL/hr, Last Rate: 5 mL/hr (02/21/24 2000)  lactated Ringer's, 10 mL/hr, Last Rate: Stopped (02/20/24 1715)  PrismaSol 4/2.5, 2,400 mL/hr, Last Rate: 2,400 mL/hr (02/21/24 1653)  propofol, 5-50 mcg/kg/min (Dosing Weight), Last Rate: 50 mcg/kg/min (02/22/24 0955)      PRN medications  PRN medications: calcium gluconate, calcium gluconate, dextrose, dextrose **OR** glucagon, diphenhydrAMINE, glucagon, HYDROmorphone, HYDROmorphone, insulin regular, lactulose, magnesium sulfate, magnesium sulfate, oxygen, potassium chloride CR **OR** potassium chloride, potassium chloride, promethazine    Results for orders placed or performed during the hospital encounter of 02/15/24 (from the past 24 hour(s))   Calcium, Ionized   Result Value Ref Range    POCT Calcium, Ionized 1.05 (L) 1.1 - 1.33 mmol/L   CBC   Result Value Ref Range    WBC 10.0 4.4 - 11.3 x10*3/uL    nRBC 2.6 (H) 0.0 - 0.0 /100 WBCs    RBC 2.84 (L) 4.00 - 5.20 x10*6/uL    Hemoglobin 8.0 (L) 12.0 - 16.0 g/dL    Hematocrit 23.0 (L) 36.0 - 46.0 %    MCV 81 80 - 100 fL    MCH 28.2 26.0 - 34.0 pg    MCHC 34.8 32.0 - 36.0 g/dL    RDW 15.1 (H) 11.5 - 14.5 %    Platelets 59 (L) 150 - 450 x10*3/uL   Magnesium   Result Value Ref Range    Magnesium 2.51 (H) 1.60 - 2.40 mg/dL   Renal function panel   Result Value Ref Range    Glucose 233 (H) 74 - 99 mg/dL    Sodium 128 (L) 136 - 145 mmol/L    Potassium 4.3 3.5 - 5.3 mmol/L    Chloride 96 (L) 98 - 107 mmol/L    Bicarbonate 20 (L) 21 - 32 mmol/L    Anion Gap 16 10 - 20 mmol/L    Urea Nitrogen 46 (H) 6 - 23 mg/dL    Creatinine 3.42 (H) 0.50 - 1.05 mg/dL    eGFR 18 (L) >60 mL/min/1.73m*2    Calcium 7.7 (L) 8.6 - 10.6 mg/dL    Phosphorus 3.3 2.5 - 4.9 mg/dL    Albumin 2.7 (L) 3.4 - 5.0 g/dL   Blood Gas Arterial Full Panel   Result Value Ref Range    POCT pH, Arterial 7.50 (H) 7.38 - 7.42 pH    POCT pCO2, Arterial 26 (L) 38 - 42 mm Hg    POCT  pO2, Arterial 242 (H) 85 - 95 mm Hg    POCT SO2, Arterial 100 94 - 100 %    POCT Oxy Hemoglobin, Arterial 97.6 94.0 - 98.0 %    POCT Hematocrit Calculated, Arterial 28.0 (L) 36.0 - 46.0 %    POCT Sodium, Arterial 126 (L) 136 - 145 mmol/L    POCT Potassium, Arterial 4.4 3.5 - 5.3 mmol/L    POCT Chloride, Arterial 97 (L) 98 - 107 mmol/L    POCT Ionized Calcium, Arterial 1.07 (L) 1.10 - 1.33 mmol/L    POCT Glucose, Arterial 238 (H) 74 - 99 mg/dL    POCT Lactate, Arterial 1.6 0.4 - 2.0 mmol/L    POCT Base Excess, Arterial -2.1 (L) -2.0 - 3.0 mmol/L    POCT HCO3 Calculated, Arterial 20.3 (L) 22.0 - 26.0 mmol/L    POCT Hemoglobin, Arterial 9.4 (L) 12.0 - 16.0 g/dL    POCT Anion Gap, Arterial 13 10 - 25 mmo/L    Patient Temperature 37.0 degrees Celsius    FiO2 40 %   POCT GLUCOSE   Result Value Ref Range    POCT Glucose 253 (H) 74 - 99 mg/dL   Blood Gas Arterial Full Panel   Result Value Ref Range    POCT pH, Arterial 7.45 (H) 7.38 - 7.42 pH    POCT pCO2, Arterial 31 (L) 38 - 42 mm Hg    POCT pO2, Arterial 201 (H) 85 - 95 mm Hg    POCT SO2, Arterial 100 94 - 100 %    POCT Oxy Hemoglobin, Arterial 97.7 94.0 - 98.0 %    POCT Hematocrit Calculated, Arterial 25.0 (L) 36.0 - 46.0 %    POCT Sodium, Arterial 126 (L) 136 - 145 mmol/L    POCT Potassium, Arterial 4.7 3.5 - 5.3 mmol/L    POCT Chloride, Arterial 98 98 - 107 mmol/L    POCT Ionized Calcium, Arterial 1.05 (L) 1.10 - 1.33 mmol/L    POCT Glucose, Arterial 272 (H) 74 - 99 mg/dL    POCT Lactate, Arterial 0.9 0.4 - 2.0 mmol/L    POCT Base Excess, Arterial -2.1 (L) -2.0 - 3.0 mmol/L    POCT HCO3 Calculated, Arterial 21.5 (L) 22.0 - 26.0 mmol/L    POCT Hemoglobin, Arterial 8.2 (L) 12.0 - 16.0 g/dL    POCT Anion Gap, Arterial 11 10 - 25 mmo/L    Patient Temperature 37.0 degrees Celsius    FiO2 40 %   Blood Gas Arterial Full Panel   Result Value Ref Range    POCT pH, Arterial 7.42 7.38 - 7.42 pH    POCT pCO2, Arterial 33 (L) 38 - 42 mm Hg    POCT pO2, Arterial 207 (H) 85 - 95 mm  Hg    POCT SO2, Arterial 100 94 - 100 %    POCT Oxy Hemoglobin, Arterial 97.4 94.0 - 98.0 %    POCT Hematocrit Calculated, Arterial 27.0 (L) 36.0 - 46.0 %    POCT Sodium, Arterial 124 (L) 136 - 145 mmol/L    POCT Potassium, Arterial 4.9 3.5 - 5.3 mmol/L    POCT Chloride, Arterial 97 (L) 98 - 107 mmol/L    POCT Ionized Calcium, Arterial 1.09 (L) 1.10 - 1.33 mmol/L    POCT Glucose, Arterial 309 (H) 74 - 99 mg/dL    POCT Lactate, Arterial 1.0 0.4 - 2.0 mmol/L    POCT Base Excess, Arterial -2.6 (L) -2.0 - 3.0 mmol/L    POCT HCO3 Calculated, Arterial 21.4 (L) 22.0 - 26.0 mmol/L    POCT Hemoglobin, Arterial 8.9 (L) 12.0 - 16.0 g/dL    POCT Anion Gap, Arterial 11 10 - 25 mmo/L    Patient Temperature 37.0 degrees Celsius    FiO2 40 %   POCT GLUCOSE   Result Value Ref Range    POCT Glucose 284 (H) 74 - 99 mg/dL   Sodium   Result Value Ref Range    Sodium 127 (L) 136 - 145 mmol/L   Blood Gas Arterial Full Panel   Result Value Ref Range    POCT pH, Arterial 7.43 (H) 7.38 - 7.42 pH    POCT pCO2, Arterial 34 (L) 38 - 42 mm Hg    POCT pO2, Arterial 213 (H) 85 - 95 mm Hg    POCT SO2, Arterial 100 94 - 100 %    POCT Oxy Hemoglobin, Arterial 97.7 94.0 - 98.0 %    POCT Hematocrit Calculated, Arterial 26.0 (L) 36.0 - 46.0 %    POCT Sodium, Arterial 125 (L) 136 - 145 mmol/L    POCT Potassium, Arterial 4.9 3.5 - 5.3 mmol/L    POCT Chloride, Arterial 96 (L) 98 - 107 mmol/L    POCT Ionized Calcium, Arterial 1.09 (L) 1.10 - 1.33 mmol/L    POCT Glucose, Arterial 299 (H) 74 - 99 mg/dL    POCT Lactate, Arterial 1.0 0.4 - 2.0 mmol/L    POCT Base Excess, Arterial -1.4 -2.0 - 3.0 mmol/L    POCT HCO3 Calculated, Arterial 22.6 22.0 - 26.0 mmol/L    POCT Hemoglobin, Arterial 8.7 (L) 12.0 - 16.0 g/dL    POCT Anion Gap, Arterial 11 10 - 25 mmo/L    Patient Temperature 37.0 degrees Celsius    FiO2 140 %   Blood Gas Arterial Full Panel   Result Value Ref Range    POCT pH, Arterial 7.41 7.38 - 7.42 pH    POCT pCO2, Arterial 31 (L) 38 - 42 mm Hg    POCT  pO2, Arterial 216 (H) 85 - 95 mm Hg    POCT SO2, Arterial 100 94 - 100 %    POCT Oxy Hemoglobin, Arterial 97.4 94.0 - 98.0 %    POCT Hematocrit Calculated, Arterial 24.0 (L) 36.0 - 46.0 %    POCT Sodium, Arterial 130 (L) 136 - 145 mmol/L    POCT Potassium, Arterial 4.3 3.5 - 5.3 mmol/L    POCT Chloride, Arterial 100 98 - 107 mmol/L    POCT Ionized Calcium, Arterial 1.20 1.10 - 1.33 mmol/L    POCT Glucose, Arterial 199 (H) 74 - 99 mg/dL    POCT Lactate, Arterial 1.9 0.4 - 2.0 mmol/L    POCT Base Excess, Arterial -4.4 (L) -2.0 - 3.0 mmol/L    POCT HCO3 Calculated, Arterial 19.6 (L) 22.0 - 26.0 mmol/L    POCT Hemoglobin, Arterial 8.1 (L) 12.0 - 16.0 g/dL    POCT Anion Gap, Arterial 15 10 - 25 mmo/L    Patient Temperature 37.0 degrees Celsius    FiO2 140 %   Sodium   Result Value Ref Range    Sodium 129 (L) 136 - 145 mmol/L   Hepatic function panel   Result Value Ref Range    Albumin 3.0 (L) 3.4 - 5.0 g/dL    Bilirubin, Total 0.5 0.0 - 1.2 mg/dL    Bilirubin, Direct 0.3 0.0 - 0.3 mg/dL    Alkaline Phosphatase 65 33 - 110 U/L     (H) 7 - 45 U/L     (H) 9 - 39 U/L    Total Protein 5.6 (L) 6.4 - 8.2 g/dL   Lactate Dehydrogenase   Result Value Ref Range     (H) 84 - 246 U/L   CBC   Result Value Ref Range    WBC 9.2 4.4 - 11.3 x10*3/uL    nRBC 5.3 (H) 0.0 - 0.0 /100 WBCs    RBC 2.84 (L) 4.00 - 5.20 x10*6/uL    Hemoglobin 8.2 (L) 12.0 - 16.0 g/dL    Hematocrit 23.3 (L) 36.0 - 46.0 %    MCV 82 80 - 100 fL    MCH 28.9 26.0 - 34.0 pg    MCHC 35.2 32.0 - 36.0 g/dL    RDW 15.6 (H) 11.5 - 14.5 %    Platelets 57 (L) 150 - 450 x10*3/uL   Basic Metabolic Panel   Result Value Ref Range    Glucose 92 74 - 99 mg/dL    Sodium 129 (L) 136 - 145 mmol/L    Potassium 4.6 3.5 - 5.3 mmol/L    Chloride 98 98 - 107 mmol/L    Bicarbonate 22 21 - 32 mmol/L    Anion Gap 14 10 - 20 mmol/L    Urea Nitrogen 35 (H) 6 - 23 mg/dL    Creatinine 2.42 (H) 0.50 - 1.05 mg/dL    eGFR 27 (L) >60 mL/min/1.73m*2    Calcium 8.2 (L) 8.6 - 10.6  mg/dL   Phosphorus   Result Value Ref Range    Phosphorus 3.7 2.5 - 4.9 mg/dL   ECMO Venous Blood Gas   Result Value Ref Range    POCT pH, Venous ECMO 7.21 Reference range not established pH    POCT pCO2, Venous ECMO 63 Reference range not established mm Hg    POCT pO2,  Venous  ECMO 49 Reference range not established mm Hg    POCT SO2, Venous ECMO 73 Reference range not established %    POCT Oxy Hemoglobin, Venous ECMO 71.9 Reference range not established %    POCT Base Excess, Venous ECMO -3.9 Reference range not established mmol/L    POCT HCO3 Calculated, Venous ECMO 25.2 Reference range not established mmol/L    Patient Temperature 37.0 degrees Celsius    FiO2 100 %   Blood Gas Arterial Full Panel   Result Value Ref Range    POCT pH, Arterial 7.41 7.38 - 7.42 pH    POCT pCO2, Arterial 36 (L) 38 - 42 mm Hg    POCT pO2, Arterial 198 (H) 85 - 95 mm Hg    POCT SO2, Arterial 100 94 - 100 %    POCT Oxy Hemoglobin, Arterial 98.2 (H) 94.0 - 98.0 %    POCT Hematocrit Calculated, Arterial 25.0 (L) 36.0 - 46.0 %    POCT Sodium, Arterial 126 (L) 136 - 145 mmol/L    POCT Potassium, Arterial 4.7 3.5 - 5.3 mmol/L    POCT Chloride, Arterial 98 98 - 107 mmol/L    POCT Ionized Calcium, Arterial 1.18 1.10 - 1.33 mmol/L    POCT Glucose, Arterial 91 74 - 99 mg/dL    POCT Lactate, Arterial 1.3 0.4 - 2.0 mmol/L    POCT Base Excess, Arterial -1.6 -2.0 - 3.0 mmol/L    POCT HCO3 Calculated, Arterial 22.8 22.0 - 26.0 mmol/L    POCT Hemoglobin, Arterial 8.3 (L) 12.0 - 16.0 g/dL    POCT Anion Gap, Arterial 10 10 - 25 mmo/L    Patient Temperature 37.0 degrees Celsius    FiO2 40 %   BLOOD GAS MIXED VENOUS FULL PANEL   Result Value Ref Range    POCT pH, Mixed 7.30 (L) 7.33 - 7.43 pH    POCT pCO2, Mixed 48 41 - 51 mm Hg    POCT pO2, Mixed 53 (H) 35 - 45 mm Hg    POCT SO2, Mixed 75 45 - 75 %    POCT Oxy Hemoglobin, Mixed 73.1 45.0 - 75.0 %    POCT Hematocrit Calculated, Mixed 25.0 (L) 36.0 - 46.0 %    POCT Sodium, Mixed 128 (L) 136 - 145 mmol/L     POCT Potassium, Mixed 4.5 3.5 - 5.3 mmol/L    POCT Chloride, Mixed 99 98 - 107 mmol/L    POCT Ionized Calcium, Mixed 1.16 1.10 - 1.33 mmol/L    POCT Glucose, Mixed 85 74 - 99 mg/dL    POCT Lactate, Mixed 1.0 0.4 - 2.0 mmol/L    POCT Base Excess, Mixed -2.8 (L) -2.0 - 3.0 mmol/L    POCT HCO3 Calculated, Mixed 23.6 22.0 - 26.0 mmol/L    POCT Hemoglobin, Mixed 8.3 (L) 12.0 - 16.0 g/dL    POCT Anion Gap, Mixed 10 10 - 25 mmo/L    Patient Temperature 37.0 degrees Celsius    FiO2 40 %   Sodium   Result Value Ref Range    Sodium 127 (L) 136 - 145 mmol/L   Tacrolimus level   Result Value Ref Range    Tacrolimus  4.4 <=15.0 ng/mL   Heparin Assay   Result Value Ref Range    Heparin Unfractionated 0.2 See Comment Below for Therapeutic Ranges IU/mL   POCT GLUCOSE   Result Value Ref Range    POCT Glucose 186 (H) 74 - 99 mg/dL   POCT GLUCOSE   Result Value Ref Range    POCT Glucose 141 (H) 74 - 99 mg/dL   Sodium   Result Value Ref Range    Sodium 127 (L) 136 - 145 mmol/L   Renal function panel   Result Value Ref Range    Glucose 207 (H) 74 - 99 mg/dL    Sodium 128 (L) 136 - 145 mmol/L    Potassium 5.3 3.5 - 5.3 mmol/L    Chloride 96 (L) 98 - 107 mmol/L    Bicarbonate 21 21 - 32 mmol/L    Anion Gap 16 10 - 20 mmol/L    Urea Nitrogen 32 (H) 6 - 23 mg/dL    Creatinine 2.19 (H) 0.50 - 1.05 mg/dL    eGFR 30 (L) >60 mL/min/1.73m*2    Calcium 7.9 (L) 8.6 - 10.6 mg/dL    Phosphorus 4.2 2.5 - 4.9 mg/dL    Albumin 3.3 (L) 3.4 - 5.0 g/dL   POCT GLUCOSE   Result Value Ref Range    POCT Glucose 189 (H) 74 - 99 mg/dL     *Note: Due to a large number of results and/or encounters for the requested time period, some results have not been displayed. A complete set of results can be found in Results Review.         Assessment/Plan   Assessment: Ms. Yimi Bowles is a 33F with a PMHx sig for stage D HFrEF (PPCM) s/p ICD s/p CardioMEMs, hypothyroidism 2/2 thyroidectomy, obesity s/p gastric bypass (2017), and SLE who is s/p OHT (3/31/2022) with a  post-OHT course complicated by RIJ/RUE DVTs, leukopenia, left groin seroma, and asymptomatic 2R ACR (11/2022) treated with steroids, s/p total hysterectomy (2023), Flu A (1/2024).    Originally presented to [facility] ED on 2/15/24 with complaints of N/V x 3 days and inability to keep medications down. POCUS revealed acute systolic heart failure w/ severe BIV dysfunction when compared to previous images in 11/2023. Also noted to have HIRAM and transaminitis. Immunosuppression notably below therapeutic range. Admitted to HFICU and treated for suspected acute cellular vs. acute antibody mediated rejection; however, cardiac biopsy negative for signs of rejection. Despite rejection therapy, inotropes and MCS via IABP, patient's end organ function continued to worsen without improvement in cardiac function. Ultimately placed on left femoral VA ECMO w/ Dr. Marina on 2/19/2024 and transferred to CTICU.     CTICU course complicated by anemia requiring blood product transfusions, volume overload & intubation (tachypnea and increased work of breathing 2/2 pulmonary edema).     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   #Acute renal failure 2/2 to cardiorenal syndrome   #Acute transaminitis     Donor/Recipient Infectious history:  CMV: -/+ (last collected 2/16/24)  Toxo: -/-   Hep C: -/-    Rejection/Prophylaxis (transplants):  - Steroids: Prednisone taper started 2/18/24 following 3xmethylprednisone pulse: Decrease daily, PO prednisone 60mg, 50mg, 40mg, 30mg, 20mg and then continue on 10mg daily   - Tacrolimus: 2.0mg PO @ 0630 & 2.0mg PO @ 1830 w/ daily levels drawn @ 0600  - Serolimus: 1.0mg PO daily w/ daily levels drawn at 0600  - Combined sirolimus/tacrolimus goal of 10-12  - Antifungals: nystatin 500,000units Q6  - Antivirals: valcyte 450mg Q48hrs   - Anti PCP & Toxoplasmosis: Bactrim SS daily      Last cardiac biopsy: 2/16/24 with ACR1 and no AMR  Last HLA:  2/16/24 negative for DSAs   Last RHC: 2/16/24 w/ RA 11, PAP 34/14(23), W 19 and C.I. 2.3  Last LHC: 2/18/24 negative for CAV and CAD   Last TTE/BOB: 2/20/24 continue to show severe BIV dysfunction   Osteopenia/osteoporosis prophylaxis: Vitamin D3 and calcium supplements  Peptic/gastric ulcer prophylaxis: Pantoprazole 40mg daily   CAV Prophylaxis: Hold Aspirin 81mg daily while on systemic heparin. Okay to resume pravastatin      - Unclear cause of acute severe graft dysfunction. Differentials include: acute ACR vs. AMR vs. viral myocarditis vs. acute lupus myocarditis vs. Vasculitis; however, 2xallograft biopsies on 2/16 & 2/20 remain negative for any significant pathology. Notably, both biopsies taken after rejection therapies implemented which may have reduced areas of graft damage.    - DSAs remain negative; however, patient may have non-HLA antibodies present. 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   - Negative CAV & CAD via LHC on 2/18/24   - Remains on MCS via right femoral IABP set 1:1 and augmenting appropriately & left femoral VA ECMO w/ appropriate flows ~4L/FIO2 100% and sweep 1.    - Remains intubated due to pulmonary edema   - Remains on CVVH due to ARF   - LFTs improved and lactate normalized.    - Completed methylpred steroid pulse w/ 1g Q24 x 3 days (2/16-2/18)  - Thymoglobulin doses: 2/18 & 2/19   - IVIG + PLEX Session: 2/18 & 2/20   - No further thymo, PLEX or IVIG treatment at this time with multiple biopsies negative for ACR & AMR   - Patient does have improved pulsatility with epinephrine infusion in place. Chest x-ray also shows improved lung fields with volume removal. Lastly, patient now producing UOP and signs of renal recovery.        Plan/Recommendations:    - Completing PO prednisone taper today and then transition to 10mg daily   - Please increase tacrolimus dose to 2mg BID starting with evening dose   - Thrombocytopenia likely multifactorial from recent thymoglobulin, MCS &  CVVH. Continue to hold aspirin and agree with PF4 to rule out HIT.   - Continue volume removal via CVVH for goal negative 1-2L in 24hrs   - Please obtain repeat echo tomorrow   - At this time due to the patient's critical illness she is not a candidate for single or duel organ transplantation. Will continue to discuss as course progresses.    Patient remains critically ill with multisystem organ failure requiring VA ECMO, IABP, CVVH and intubation. Appreciate continued CTICU care/management.    Seen with the Heart Failure/Heart Transplant Attending, Dr. Del Rosario & CTICU team     Heart Transplant Team:   [employer]  [phone]    Keith Jain, CNP

## 2024-02-23 NOTE — PROGRESS NOTES
CTICU Progress Note  Charla Bowles/[id number]    Admit Date: 2/15/2024  Hospital Length of Stay: 8   ICU Length of Stay: 3d 14h   CT SURGEON:     SUBJECTIVE:   Insulin gtt off d/t BG 90s, bival therapeutic, -4.5L, on CPAP.    MEDICATIONS  Infusions:  bivalirudin, Last Rate: 0.09 mg/kg/hr (02/23/24 0647)  dextrose 5 % in water (D5W), Last Rate: 25 mL/hr (02/23/24 0600)  EPINEPHrine, Last Rate: 0.01 mcg/kg/min (02/22/24 2247)  insulin regular infusion for cardiac surgery, Last Rate: Stopped (02/23/24 0430)  lactated Ringer's, Last Rate: 5 mL/hr (02/23/24 0600)  lactated Ringer's, Last Rate: Stopped (02/20/24 1715)  PrismaSol 4/2.5, Last Rate: 2,400 mL/hr (02/21/24 1653)  propofol, Last Rate: 35 mcg/kg/min (02/23/24 0647)      Scheduled:  acetaminophen, 650 mg, q6h  [Held by provider] aspirin, 81 mg, Daily  calcitriol, 0.5 mcg, Daily  esomeprazole, 40 mg, Daily before breakfast  ferrous sulfate, 60 mg of iron, Daily  [Held by provider] insulin lispro, 0-15 Units, q6h  multivitamin with iron-minerals, 15 mL, Daily  nystatin, 5 mL, q6h  perflutren lipid microspheres, 0.5-10 mL of dilution, Once in imaging  perflutren protein A microsphere, 0.5 mL, Once in imaging  [Held by provider] polyethylene glycol, 17 g, BID  predniSONE, 10 mg, Daily  [Held by provider] rosuvastatin, 40 mg, Nightly  sirolimus, 1 mg, Daily  sulfamethoxazole-trimethoprim, 80 mg of trimethoprim, Daily  sulfur hexafluoride microsphr, 2 mL, Once in imaging  tacrolimus, 2 mg, q12h TRICIA  valGANciclovir, 450 mg, q48h      PRN:  calcium gluconate, 1 g, q6h PRN  calcium gluconate, 2 g, q6h PRN  dextrose, 25 g, q15 min PRN  dextrose, 25 g, q15 min PRN   Or  glucagon, 1 mg, q15 min PRN  diphenhydrAMINE, 25 mg, q6h PRN  glucagon, 1 mg, q15 min PRN  HYDROmorphone, 0.4 mg, q3h PRN  HYDROmorphone, 1 mg, q4h PRN  insulin regular, 0-15 Units, PRN  lactulose, 20 g, Daily PRN  magnesium sulfate, 1 g, q6h PRN  magnesium sulfate, 2 g, q6h PRN  oxygen, , Continuous  "PRN - O2/gases  potassium chloride CR, 20 mEq, q6h PRN   Or  potassium chloride, 20 mEq, q6h PRN  potassium chloride, 40 mEq, q6h PRN  promethazine, 12.5 mg, q6h PRN        PHYSICAL EXAM:   Visit Vitals  /57   Pulse (!) 115   Temp 36.9 °C (98.4 °F) (Core)   Resp 16   Ht 1.549 m (5' 0.98\")   Wt 99.6 kg (219 lb 9.3 oz)   SpO2 99%   BMI 41.51 kg/m²   OB Status Hysterectomy   Smoking Status Never   BSA 2.07 m²     Wt Readings from Last 5 Encounters:   02/21/24 99.6 kg (219 lb 9.3 oz)   12/07/23 92.1 kg (203 lb)   12/01/23 93 kg (205 lb)   11/29/23 92.9 kg (204 lb 12.8 oz)   11/09/23 91.3 kg (201 lb 3.2 oz)     INTAKE/OUTPUT:  I/O last 3 completed shifts:  In: 5959.7 (59.8 mL/kg) [P.O.:350; I.V.:4424.7 (44.4 mL/kg); NG/GT:935; IV Piggyback:250]  Out: 17452 (124.3 mL/kg) [Urine:1115 (0.3 mL/kg/hr); Other:96836; Stool:400]  Weight: 99.6 kg          Vent settings:  Vent Mode: Pressure support  FiO2 (%):  [30 %-40 %] 30 %  S RR:  [12] 12  S VT:  [380 mL] 380 mL  PEEP/CPAP (cm H2O):  [8 cm H20-12 cm H20] 8 cm H20  ND SUP:  [10 cm H20] 10 cm H20  MAP (cm H2O):  [11-15] 11    Physical Exam:   - CONSTITUTION: critically ill, young appearing female on ECMO and IABP  - NEUROLOGIC: sedated on propofol infusion, on SAT following commands.  Moving all extremities with no focal deficits  - CARDIOVASCULAR: ST. On VA ECMO left femoral vein and artery with distal perfusion catheter. Right femoral IABP 1:1 with appropriate augmentation  - RESPIRATORY: Orally intubated on AC/VC.   - GI: Singh with clear, yellow urine.   - : obese, soft.  Active BS.   - EXTREMITIES: warm, non-pitting edema. Well perfused.  Right fem IABP, Left fem VA ECMO, small hematoma, warm extremity down to ankle, left foot cool, dopplerable popliteal, distal perfusion to left foot seen under ultrasound  - SKIN: Intact  - PSYCHIATRIC: JOSE    Daily Risk Screen  Intubated: required- inadequate oxygenation and hemodynamic instability  Central line: required- HD, " hemodynamic monitoring, parenteral medications  Singh: required, accurate I/O in critically ill    Images:  Morning CXR reviewed.    Invasive Hemodynamics:    Most Recent Range Past 24hrs   BP (Art) 124/68 Arterial Line BP 1  Min: 112/59  Max: 141/72   MAP(Art) 88 mmHg Arterial Line MAP 1 (mmHg)   Min: 79 mmHg  Max: 104 mmHg   RA/CVP   No data recorded   PA 19/14 PAP  Min: 14/10  Max: 40/25   PA(mean) 16 mmHg PAP (Mean)  Min: 12 mmHg  Max: 33 mmHg   CO 5.7 L/min No data recorded   CI 2.9 L/min/m2 No data recorded   Mixed Venous 83 % SVO2 (%)  Min: 63 %  Max: 88 %   SVR  758 (dyne*sec)/cm5 No data recorded     ECMO:     Most Recent Range Past 24hrs   Flow 3.66 Patient Flow (L/min)  Min: 3.43  Max: 3.79   Speed 3300 Circuit Flow (RPM)  Min: 3300  Max: 3300   Sweep 1 Sweep Gas Flow Rate (L/min)  Min: 1  Max: 1        Assessment/Plan   33 year old female with past medical history of heart transplant in March 2022 with postoperative course complicated by upper extremity/internal jugular DVTs, and 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 She was admitted to HFICU on 2/15 with concern for cardiogenic shock secondary to allograft rejection and decompensated heart failure with multiorgan dysfunction including significant elevation of liver enzymes and nonoliguric acute kidney injury. Endomyocardial biopsy on 2/16 revealed mild ACR with +CD4s and negative HLAs, however multisystem organ failure persisted with increased inotropic requirements. IABP placed on 2/18. Transferred to CTICU on 2/19 for VA ECMO cannulation with Dr. Marina for persistent multi-organ system dysfunction. Intubated 2/21 for respiratory failure 2/2 pulmonary edema.      Plan:  NEURO: No history. Neuro intact and CAM negative. RASS -2 on Propofol gtt. Did not tolerate precedex.-->  - Serial neuro and pain assessments  - PRN Tylenol 650mg q6hr (mild)  - continue PO dilaudid 1mg for moderate pain and IV dilaudid 0.4mg for severe pain  - Minimize propofol as  able  - PT/OT Consult  - CAM ICU score qshift  - Sleep/wake cycle hygiene     CV: Patient has a history of heart transplant in March of 2022 with TTE on 11/29 with LVEF 60-65%. Admitted for cardiogenic shock with concern for acute cellular rejection s/p IABP placement (R fem) 2/18 and VA ECMO (left fem) 2/19. ECHO 2/20 with persisting severe BiV dysfunction despite negative biopsy 2/20.  Echo 2/22 EF 10%, severely reduced RV, mild AR and mild-moderate MR. Remains on ECMO 3.7L flow/100%FiO2 /sweep 1.  IABP 1:1. On epinephrine 0.01 mcg/kg/min. End organ perfusion stable. -118.  Loss of PAP pulsatility today with return of pulsatility at 3LPM of flow or less.-->  - Maintain goal MAP 70-90  - Maintain IABP 1:1  - continue full ECMO support while optimizing volume status  - Increase epi to 0.04mcg/kg/min  - Repeat TTE today with ECMO turn down  - Daily ECMO gases, LDH   - Hold daily rosuvastatin 40mg, will resume if LFT normalize  - Hold home amlodipine  - Possibility of transition to Impella 5.5 under discussion with cardiac surgery team  - Reduce ECMO flow to 3LPM to ensure adequate RV preload and PA pulsatility    VASC: vascular surgery following for diminished pulses in LLE.  CK WNL, LLE warm down to ankle.  Dopplerable popliteal pulses, DP flow seen by ultrasound.  - Continue to trend CK daily  - Keep LLE warm  - Vascular surgery following  - Formal LLE doppler ultrasound today per vascular surgery  - Continue bival gtt, see HEME.     PULM: No history of pulmonary disease. Acute respiratory failure due to tachypnea and acute pulmonary edema on CXR with frothy secretion.  Intubated 2/21 due to respiratory distress 2/2 pulmonary edema.  Appropriate plateau pressures. Currently on CPAP 30% FiO2, 8 PEEP, 10 PS. CXR with improving pulmonary edema.  Of note, increased WOB and RR correlates with loss of PA pulsatility and RV ejection.-->  - Daily CXR  - Maintain SPO2 > 92%  - Adjust sweep for normal pH  - SBT today,  will assess for extubation  - See CV for RV support, PA pulsatility plan     GI: History of gastric bypass and MMF colitis.  Dobhoff in place, TF on hold for potential extubation.  FMS in place, 100mL out overnight.-->  - Continue daily PPI   - Continue calcitriol 0.5mg daily  - Continue multivitamin  - Continue Calcium carbonate 1,250mg BID  - Hold TF, prostat in anticipation of extubation trial  - Bowel regimen on hold d/t liquid stools     : No history of renal disease, baseline creatinine 1.2-1.3. Oliguric HIRAM likely in setting of cardiorenal physiology. Renal US from 2/19 unremarkable. Hyponatremia, D5W to prevent rapid overcorrection with initiation of CVVH. On CVVH with sodium at 132 this AM.  Singh in place and making urine.  Negative 4.5L overnight.-->  - continue CVVH for goal negative 2-3L  - 24 hours scheduled concentrated albumin  - Stop D5W infusion  - RFP BID  - Serum sodium q4hr  - Replete electrolytes per CTICU protocol  - Nephrology following, appreciate recs     ENDO: History of hypothyroidism TSH 12.02, free thyroxine 2.19. A1c: 6.3. Insulin gtt stopped overnight.-->  - Maintain BG <180 with hypoglycemia protocol  - Resume SSI #3 q6hs  - Continue synthroid 200mcg daily  - Endocrine consulted, appreciate recs     HEME: History of remote DVT. Acute on chronic iron deficiency anemia. Acute blood loss anemia, last received 1U PRBC 2/20. Bleeding from ECMO cannulas resolved.  Downtrending thrombocytopenia. PF4 2/21 pending.-->    - CBC BID  - Continue bival gtt  - Follow-up PF4 - sent out to [facility], will result in Care Everywhere  - hold ASA with thrombocytopenia  - Continue daily ferrous sulfate  - SCDs for DVT prophylaxis  - Last type and screen: 2/22     Rejection/prophylaxis: S/p heart transplant 3/31/2022. Induction basiliximab. Donor HCV -, toxo -/-, CMV -/+. Mild ACR with +CD4 and negative HLAs however clinical presentation concern for AMR rejection.  PLEX/IVIG 2/17, 2/20. Thymo 2/18, 2/19.  Repeat biopsy 2/20 with no evidence of on going rejection (ACR 0R, pAMR0 and no C3D or C4D)  - Completed steroid taper, continue prednisone 10mg daily  - Continue tacrolimus 2mg BID with goal FK level 3-5  - Continue sirolimus 1mg daily with goal level 7-9  - Plan for no further PLEX/IVIG or thymo  - continue Nystatin suspension 500,000 units q6hr  - Hold bactrim and valcyte in setting of thrombocytopenia     ID: Afebrile, no current indications of infection. Received Zosyn and Vancomycin on 2/15 in ED. Blood cultures from 2/15 negative. -->  - Trend temp q4h     Skin: No active skin issues.  - Preventative Mepilex dressings in place on sacrum and heels  - Change preventative Mepilex weekly or more frequently as indicated (when moist/soiled)   - Every shift skin assessment per nursing and weekly ICU skin rounds  - Moisture barrier to be applied with christopher care  - Active skin problems addressed with nursing on daily rounds     Proph:  SCDs  PPI  Bivalirudin gtt     G: Line  Right radial arterial line placed 2/16  RIJ introducer with PAC placed 2/16  LIF Trialysis placed 2/17  Right femoral IABP placed 2/18  8F Left femoral reperfusion cannula placed 2/19  Left femoral 17F arterial ECMO cannula placed 2/19  Left femoral 25F venous ECMO cannula placed 2/19     F: Family: Mother updated at bedside    Restraints: Indicated as alternatives have been attempted and have been ineffective; restrain with soft wrist restraints until medical devices discontinued.    Code status: Full Code     I personally spent 50 minutes of critical care time directly and personally managing the patient exclusive of separately billable procedures.     A,B,C,D,E,F,G: reviewed     Discussed with Dr. Mehta, Dr. Wright  Dispo: CTICU care for now.    CTICU TEAM PHONE [phone]    Rvai Torres, APRN-CNP

## 2024-02-23 NOTE — PROGRESS NOTES
3/3 CTICU     OVERVIEW  03/2022 (OHT) Heart Transplant presented to [facility] ED 2/15 for n/v. 2/16 heart biopsy with rejection. 2/18 IABP. 2/19 VA-ECMO & CVVH --> transferred from  to CTICU. Presently 2/20 on room air. CTICU course complicated by ongoing anemia requiring transfusion requirements, volume overload & 2/21 intubation (tachypnea and increased work of breathing 2/2 pulmonary edema).     ROUNDS  2/23 - Impella 5.5 today or Monday tentatively.  May extubate today.      DC PLAN  TBD - Care Transitions is following to develop a safe & supportive discharge plan in collaboration with TRANSPLANT & multidisciplinary teams (along with) patient/family/significant others.       PAYOR  [facility]      COMPLETED  (X) Daily ongoing review of patient via chart and/or (M-F) IDT rounds     Claribel Diaz (LSW, MSW)

## 2024-02-23 NOTE — PROGRESS NOTES
Subjective Data:  Remains on VA ecmo, with IABP, CRRT, intubated and on vasopressors  IABP 1:1     ECMO turn down trial today        Objective Data:  Last Recorded Vitals:  Vitals:    02/23/24 1300 02/23/24 1400 02/23/24 1414 02/23/24 1500   BP:       Pulse: (!) 126 (!) 115  (!) 124   Resp: (!) 36 (!) 30  25   Temp:       TempSrc:       SpO2:  100% 100%    Weight:       Height:           Last Labs:  CBC - 2/23/2024: 12:10 PM  10.6 7.9 54    23.5      CMP - 2/23/2024: 12:10 PM  8.3 5.6 129 --- 0.6   2.5 3.4 111 69      PTT - 2/23/2024:  4:21 AM  1.3   14.9 52     TROPHS   Date/Time Value Ref Range Status   02/16/2024 01:16  0 - 34 ng/L Final     Comment:     Previous result verified on 2/16/2024 0018 on specimen/case [id number] called with component [facility] for procedure Troponin I, High Sensitivity with value 125 ng/L.   02/15/2024 10:03  0 - 34 ng/L Final   11/10/2022 11:22  0 - 34 ng/L Final     Comment:     .  Less than 99th percentile of normal range cutoff-  Female and children under 18 years old <35 ng/L; Male <54 ng/L: Negative  Repeat testing should be performed if clinically indicated.   .  Female and children under 18 years old  ng/L; Male  ng/L:  Consistent with possible cardiac damage and possible increased clinical   risk. Serial measurements may help to assess extent of myocardial damage.   .  >120 ng/L: Consistent with cardiac damage, increased clinical risk and  myocardial infarction. Serial measurements may help assess extent of   myocardial damage.   .   NOTE: Children less than 1 year old may have higher baseline troponin   levels and results should be interpreted in conjunction with the overall   clinical context.  .  NOTE: Troponin I testing is performed using a different   testing methodology at [facility] than at other   [facility] hospitals. Direct result comparisons should only   be made within the same method.       BNP   Date/Time Value Ref Range  Status   02/16/2024 01:16 AM >5,000 0 - 99 pg/mL Final   02/15/2024 10:03 PM >5,000 0 - 99 pg/mL Final     HGBA1C   Date/Time Value Ref Range Status   02/16/2024 01:16 AM 6.3 see below % Final   03/17/2023 08:38 AM 5.7 % Final     Comment:          Diagnosis of Diabetes-Adults   Non-Diabetic: < or = 5.6%   Increased risk for developing diabetes: 5.7-6.4%   Diagnostic of diabetes: > or = 6.5%  .       Monitoring of Diabetes                Age (y)     Therapeutic Goal (%)   Adults:          >18           <7.0   Pediatrics:    13-18           <7.5                   7-12           <8.0                   0- 6            7.5-8.5   American Diabetes Association. Diabetes Care 33(S1), Jan 2010.     12/14/2022 03:41 PM 6.4 % Final     Comment:          Diagnosis of Diabetes-Adults   Non-Diabetic: < or = 5.6%   Increased risk for developing diabetes: 5.7-6.4%   Diagnostic of diabetes: > or = 6.5%  .       Monitoring of Diabetes                Age (y)     Therapeutic Goal (%)   Adults:          >18           <7.0   Pediatrics:    13-18           <7.5                   7-12           <8.0                   0- 6            7.5-8.5   American Diabetes Association. Diabetes Care 33(S1), Jan 2010.       LDLCALC   Date/Time Value Ref Range Status   02/16/2024 01:16 AM 94 <=99 mg/dL Final     Comment:                                 Near   Borderline      AGE      Desirable  Optimal    High     High     Very High     0-19 Y     0 - 109     ---    110-129   >/= 130     ----    20-24 Y     0 - 119     ---    120-159   >/= 160     ----      >24 Y     0 -  99   100-129  130-159   160-189     >/=190       VLDL   Date/Time Value Ref Range Status   02/16/2024 01:16 AM 28 0 - 40 mg/dL Final   07/18/2023 09:24 AM 32 0 - 40 mg/dL Final   03/17/2023 08:38 AM 40 0 - 40 mg/dL Final   11/10/2022 11:22 PM 44 0 - 40 mg/dL Final      Last I/O:  I/O last 3 completed shifts:  In: 5959.7 (59.8 mL/kg) [P.O.:350; I.V.:4424.7 (44.4 mL/kg); NG/GT:935; IV  Piggyback:250]  Out: 79589 (124.3 mL/kg) [Urine:1115 (0.3 mL/kg/hr); Other:52667; Stool:400]  Weight: 99.6 kg     Inpatient Medications:  Scheduled medications   Medication Dose Route Frequency    albumin human  25 g intravenous q8h    [Held by provider] aspirin  81 mg oral Daily    calcitriol  0.5 mcg oral Daily    esomeprazole  40 mg nasogastric tube Daily before breakfast    ferrous sulfate  60 mg of iron oral Daily    insulin lispro  0-15 Units subcutaneous q6h    [START ON 2/24/2024] levothyroxine  200 mcg nasogastric tube Daily    multivitamin with iron-minerals  15 mL oral Daily    nystatin  5 mL Swish & Swallow q6h    perflutren lipid microspheres  0.5-10 mL of dilution intravenous Once in imaging    perflutren protein A microsphere  0.5 mL intravenous Once in imaging    [Held by provider] polyethylene glycol  17 g oral BID    predniSONE  10 mg oral Daily    [Held by provider] rosuvastatin  40 mg oral Nightly    sirolimus  1 mg oral Daily    [Held by provider] sulfamethoxazole-trimethoprim  80 mg of trimethoprim oral Daily    sulfur hexafluoride microsphr  2 mL intravenous Once in imaging    sulfur hexafluoride microsphr  2 mL intravenous Once in imaging    tacrolimus  2.5 mg oral q12h TRICIA    [Held by provider] valGANciclovir  450 mg oral q48h     PRN medications   Medication    acetaminophen    calcium gluconate    calcium gluconate    dextrose    dextrose    Or    glucagon    diphenhydrAMINE    glucagon    HYDROmorphone    HYDROmorphone    insulin regular    lactulose    artificial tears    magnesium sulfate    magnesium sulfate    oxygen    potassium chloride CR    Or    potassium chloride    potassium chloride    promethazine     Continuous Medications   Medication Dose Last Rate    bivalirudin  0-0.15 mg/kg/hr 0.09 mg/kg/hr (02/23/24 1500)    EPINEPHrine  0.01 mcg/kg/min (Dosing Weight) 0.01 mcg/kg/min (02/23/24 1500)    lactated Ringer's  5 mL/hr 5 mL/hr (02/23/24 1500)    lactated Ringer's  10 mL/hr  Stopped (24 1715)    PrismaSol 4/2.5  2,400 mL/hr 2,400 mL/hr (24 1653)    propofol  5-50 mcg/kg/min (Dosing Weight) 45 mcg/kg/min (24 1500)     Physical Exam  Constitutional:       Appearance: She is ill-appearing.   Cardiovascular:      Rate and Rhythm: Tachycardia present.      Pulses:           Dorsalis pedis pulses are detected w/ Doppler on the right side.      Comments: IABP inflate/ deflate auscultated,  Was unable to located L DP, primary team attempting to locate with doppler, R DP faint but detectable with doppler   Pulmonary:      Comments: Intubated, mechanical breath sounds   Musculoskeletal:      Right lower le+ Edema present.      Left lower le+ Edema present.   Skin:     General: Skin is warm.      Comments: Oozing noted left femoral VA access site; right femoral site stable, minimal oozing   Neurological:      Mental Status: She is lethargic.        Assessment/Plan   Charla Bowles is a 33 year old female with past medical history of heart transplant in 2022 with postoperative course complicated by upper extremity/internal jugular DVTs, and asymptomatic 2R rejection in 2022. She was admitted to HFICU on 2/15 with concern for cardiogenic shock secondary to allograft rejection and decompensated heart failure with multiorgan dysfunction including significant elevation of liver enzymes and nonoliguric acute kidney injury. Endomyocardial biopsy on  revealed mild ACR with +CD4s and negative HLAs, however multisystem organ failure persisted with increased inotropic requirements. IABP placed on . Transferred to CTICU on  for VA ECMO cannulation with Dr. Marina for persistent multisystem dysfunction.     : IABP placed in cath lab, [facility] showed right dominant coronary circulation with no significant coronary artery disease, LVEDP 20    : multi-disciplinary review convened; given persistent requirement for pharmacological and mechanical support,  and with the addition of oliguric/anuric renal insufficiency, decision made to transfer to CTICU and initiate VA ECMO    2/20: pending endomyocardial biopsy today for further investigation of allograft dysfunction    2/21-22: on VA ecmo, with IABP, CRRT, intubated and on vasopressors    2/23: ECMO wean down trial; considering extubation, considering transition to Impella 5.5     Cardiogenic shock 2/2 suspected allograft rejection  S/p OHT 3/2022  - Intra- aortic balloon pump placed on 2/18, VA ECMO cannulation 2/19  - CXR placement satisfactory for today   - Current IABP settings: 1:1, augmentation 111, assisted BP 95/72 (85) site with no hematoma/bleeding   - Plan to wean as hemodynamics per primary team     Recommendations:  - daily CXR with IABP  - please maintain strict bedrest while IABP in place  - closely monitor groin insertion site and distal pulses  - May elevate HOB no greater than 30 degrees  - May log roll patient side to side without flexing involved extremity  - Interventional cardiology will continue to follow, will defer primary care to CICU team     Full Code    Brandee Herrera, APRN-CNP
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
## 2024-02-23 NOTE — PROGRESS NOTES
"Charla Bowles is a 33 y.o. female on day 8 of admission presenting with Cardiogenic shock (CMS/HCC).    Briefly, the patient is a 33 year old female with past medical history of heart transplant in March 2022 admitted 2/15 with biopsy on 2/16 showing mild ACR.  Sequential escalation of support to IABP (2/18) and VA-ECMO (2/19). Intubated on 2/21 due to pulmonary edema.       Objective     Physical Exam    Last Recorded Vitals  Blood pressure 112/57, pulse (!) 123, temperature 36.7 °C (98.1 °F), temperature source Core, resp. rate (!) 28, height 1.549 m (5' 0.98\"), weight 99.6 kg (219 lb 9.3 oz), SpO2 100 %.  Intake/Output last 3 Shifts:  I/O last 3 completed shifts:  In: 5959.7 (59.8 mL/kg) [P.O.:350; I.V.:4424.7 (44.4 mL/kg); NG/GT:935; IV Piggyback:250]  Out: 46310 (124.3 mL/kg) [Urine:1115 (0.3 mL/kg/hr); Other:52582; Stool:400]  Weight: 99.6 kg     Relevant Results  Results for orders placed or performed during the hospital encounter of 02/15/24 (from the past 24 hour(s))   POCT GLUCOSE   Result Value Ref Range    POCT Glucose 144 (H) 74 - 99 mg/dL   Blood Gas Arterial Full Panel   Result Value Ref Range    POCT pH, Arterial 7.39 7.38 - 7.42 pH    POCT pCO2, Arterial 36 (L) 38 - 42 mm Hg    POCT pO2, Arterial 232 (H) 85 - 95 mm Hg    POCT SO2, Arterial 100 94 - 100 %    POCT Oxy Hemoglobin, Arterial 97.6 94.0 - 98.0 %    POCT Hematocrit Calculated, Arterial 29.0 (L) 36.0 - 46.0 %    POCT Sodium, Arterial 127 (L) 136 - 145 mmol/L    POCT Potassium, Arterial 4.8 3.5 - 5.3 mmol/L    POCT Chloride, Arterial 100 98 - 107 mmol/L    POCT Ionized Calcium, Arterial 1.08 (L) 1.10 - 1.33 mmol/L    POCT Glucose, Arterial 117 (H) 74 - 99 mg/dL    POCT Lactate, Arterial 0.7 0.4 - 2.0 mmol/L    POCT Base Excess, Arterial -2.8 (L) -2.0 - 3.0 mmol/L    POCT HCO3 Calculated, Arterial 21.8 (L) 22.0 - 26.0 mmol/L    POCT Hemoglobin, Arterial 9.6 (L) 12.0 - 16.0 g/dL    POCT Anion Gap, Arterial 10 10 - 25 mmo/L    Patient " Temperature 37.0 degrees Celsius    FiO2 40 %   Renal function panel   Result Value Ref Range    Glucose 114 (H) 74 - 99 mg/dL    Sodium 129 (L) 136 - 145 mmol/L    Potassium 4.7 3.5 - 5.3 mmol/L    Chloride 97 (L) 98 - 107 mmol/L    Bicarbonate 20 (L) 21 - 32 mmol/L    Anion Gap 17 10 - 20 mmol/L    Urea Nitrogen 31 (H) 6 - 23 mg/dL    Creatinine 2.01 (H) 0.50 - 1.05 mg/dL    eGFR 33 (L) >60 mL/min/1.73m*2    Calcium 7.9 (L) 8.6 - 10.6 mg/dL    Phosphorus 4.0 2.5 - 4.9 mg/dL    Albumin 3.5 3.4 - 5.0 g/dL   CBC during infusion of Bivalrudin   Result Value Ref Range    WBC 10.9 4.4 - 11.3 x10*3/uL    nRBC 2.7 (H) 0.0 - 0.0 /100 WBCs    RBC 3.23 (L) 4.00 - 5.20 x10*6/uL    Hemoglobin 9.0 (L) 12.0 - 16.0 g/dL    Hematocrit 25.5 (L) 36.0 - 46.0 %    MCV 79 (L) 80 - 100 fL    MCH 27.9 26.0 - 34.0 pg    MCHC 35.3 32.0 - 36.0 g/dL    RDW 15.2 (H) 11.5 - 14.5 %    Platelets 66 (L) 150 - 450 x10*3/uL   Magnesium   Result Value Ref Range    Magnesium 2.76 (H) 1.60 - 2.40 mg/dL   Calcium, Ionized   Result Value Ref Range    POCT Calcium, Ionized 1.04 (L) 1.1 - 1.33 mmol/L   Creatine Kinase   Result Value Ref Range    Creatine Kinase 113 0 - 215 U/L   POCT GLUCOSE   Result Value Ref Range    POCT Glucose 85 74 - 99 mg/dL   Transthoracic Echo (TTE) Limited   Result Value Ref Range    LVIDd 4.80 cm    RVSP 25.3 mmHg   POCT GLUCOSE   Result Value Ref Range    POCT Glucose 164 (H) 74 - 99 mg/dL   Renal function panel   Result Value Ref Range    Glucose 218 (H) 74 - 99 mg/dL    Sodium 129 (L) 136 - 145 mmol/L    Potassium 4.8 3.5 - 5.3 mmol/L    Chloride 98 98 - 107 mmol/L    Bicarbonate 22 21 - 32 mmol/L    Anion Gap 14 10 - 20 mmol/L    Urea Nitrogen 34 (H) 6 - 23 mg/dL    Creatinine 2.26 (H) 0.50 - 1.05 mg/dL    eGFR 29 (L) >60 mL/min/1.73m*2    Calcium 7.9 (L) 8.6 - 10.6 mg/dL    Phosphorus 4.4 2.5 - 4.9 mg/dL    Albumin 3.0 (L) 3.4 - 5.0 g/dL   aPTT 2 hours after initiation of Bivalrudin   Result Value Ref Range    aPTT 45 (H)  27 - 38 seconds   Creatine Kinase   Result Value Ref Range    Creatine Kinase 114 0 - 215 U/L   Blood Gas Arterial Full Panel   Result Value Ref Range    POCT pH, Arterial 7.43 (H) 7.38 - 7.42 pH    POCT pCO2, Arterial 32 (L) 38 - 42 mm Hg    POCT pO2, Arterial 220 (H) 85 - 95 mm Hg    POCT SO2, Arterial 100 94 - 100 %    POCT Oxy Hemoglobin, Arterial 97.5 94.0 - 98.0 %    POCT Hematocrit Calculated, Arterial 32.0 (L) 36.0 - 46.0 %    POCT Sodium, Arterial 126 (L) 136 - 145 mmol/L    POCT Potassium, Arterial 4.9 3.5 - 5.3 mmol/L    POCT Chloride, Arterial 98 98 - 107 mmol/L    POCT Ionized Calcium, Arterial 1.08 (L) 1.10 - 1.33 mmol/L    POCT Glucose, Arterial 212 (H) 74 - 99 mg/dL    POCT Lactate, Arterial 0.6 0.4 - 2.0 mmol/L    POCT Base Excess, Arterial -2.5 (L) -2.0 - 3.0 mmol/L    POCT HCO3 Calculated, Arterial 21.2 (L) 22.0 - 26.0 mmol/L    POCT Hemoglobin, Arterial 10.5 (L) 12.0 - 16.0 g/dL    POCT Anion Gap, Arterial 12 10 - 25 mmo/L    Patient Temperature 37.0 degrees Celsius    FiO2 40 %   POCT GLUCOSE   Result Value Ref Range    POCT Glucose 214 (H) 74 - 99 mg/dL   Blood Gas Arterial Full Panel   Result Value Ref Range    POCT pH, Arterial 7.38 7.38 - 7.42 pH    POCT pCO2, Arterial 37 (L) 38 - 42 mm Hg    POCT pO2, Arterial 178 (H) 85 - 95 mm Hg    POCT SO2, Arterial 100 94 - 100 %    POCT Oxy Hemoglobin, Arterial 97.1 94.0 - 98.0 %    POCT Hematocrit Calculated, Arterial 26.0 (L) 36.0 - 46.0 %    POCT Sodium, Arterial 129 (L) 136 - 145 mmol/L    POCT Potassium, Arterial 4.3 3.5 - 5.3 mmol/L    POCT Chloride, Arterial 101 98 - 107 mmol/L    POCT Ionized Calcium, Arterial 1.14 1.10 - 1.33 mmol/L    POCT Glucose, Arterial 102 (H) 74 - 99 mg/dL    POCT Lactate, Arterial 0.8 0.4 - 2.0 mmol/L    POCT Base Excess, Arterial -2.9 (L) -2.0 - 3.0 mmol/L    POCT HCO3 Calculated, Arterial 21.9 (L) 22.0 - 26.0 mmol/L    POCT Hemoglobin, Arterial 8.5 (L) 12.0 - 16.0 g/dL    POCT Anion Gap, Arterial 10 10 - 25 mmo/L     Patient Temperature 37.0 degrees Celsius    FiO2 30 %   POCT GLUCOSE   Result Value Ref Range    POCT Glucose 92 74 - 99 mg/dL   aPTT 2 hours after a single therapeutic aPTT   Result Value Ref Range    aPTT 47 (H) 27 - 38 seconds   Lactate Dehydrogenase   Result Value Ref Range     (H) 84 - 246 U/L   Hepatic function panel   Result Value Ref Range    Albumin 3.2 (L) 3.4 - 5.0 g/dL    Bilirubin, Total 0.6 0.0 - 1.2 mg/dL    Bilirubin, Direct 0.2 0.0 - 0.3 mg/dL    Alkaline Phosphatase 69 33 - 110 U/L     (H) 7 - 45 U/L     (H) 9 - 39 U/L    Total Protein 5.6 (L) 6.4 - 8.2 g/dL   Type and screen   Result Value Ref Range    ABO TYPE O     Rh TYPE POS     ANTIBODY SCREEN NEG    Creatine Kinase   Result Value Ref Range    Creatine Kinase 153 0 - 215 U/L   Renal function panel   Result Value Ref Range    Glucose 177 (H) 74 - 99 mg/dL    Sodium 129 (L) 136 - 145 mmol/L    Potassium 4.9 3.5 - 5.3 mmol/L    Chloride 98 98 - 107 mmol/L    Bicarbonate 20 (L) 21 - 32 mmol/L    Anion Gap 16 10 - 20 mmol/L    Urea Nitrogen 33 (H) 6 - 23 mg/dL    Creatinine 1.95 (H) 0.50 - 1.05 mg/dL    eGFR 35 (L) >60 mL/min/1.73m*2    Calcium 7.7 (L) 8.6 - 10.6 mg/dL    Phosphorus 3.7 2.5 - 4.9 mg/dL    Albumin 3.2 (L) 3.4 - 5.0 g/dL   Calcium, Ionized   Result Value Ref Range    POCT Calcium, Ionized 1.02 (L) 1.1 - 1.33 mmol/L   CBC   Result Value Ref Range    WBC 10.2 4.4 - 11.3 x10*3/uL    nRBC 2.2 (H) 0.0 - 0.0 /100 WBCs    RBC 2.88 (L) 4.00 - 5.20 x10*6/uL    Hemoglobin 8.0 (L) 12.0 - 16.0 g/dL    Hematocrit 23.2 (L) 36.0 - 46.0 %    MCV 81 80 - 100 fL    MCH 27.8 26.0 - 34.0 pg    MCHC 34.5 32.0 - 36.0 g/dL    RDW 15.5 (H) 11.5 - 14.5 %    Platelets 48 (L) 150 - 450 x10*3/uL   Magnesium   Result Value Ref Range    Magnesium 2.59 (H) 1.60 - 2.40 mg/dL   POCT GLUCOSE   Result Value Ref Range    POCT Glucose 165 (H) 74 - 99 mg/dL   aPTT 2 hours after any Bivalrudin dosage change   Result Value Ref Range    aPTT 51 (H) 27  - 38 seconds   Blood Gas Arterial Full Panel   Result Value Ref Range    POCT pH, Arterial 7.46 (H) 7.38 - 7.42 pH    POCT pCO2, Arterial 31 (L) 38 - 42 mm Hg    POCT pO2, Arterial 185 (H) 85 - 95 mm Hg    POCT SO2, Arterial 100 94 - 100 %    POCT Oxy Hemoglobin, Arterial 97.8 94.0 - 98.0 %    POCT Hematocrit Calculated, Arterial 24.0 (L) 36.0 - 46.0 %    POCT Sodium, Arterial 129 (L) 136 - 145 mmol/L    POCT Potassium, Arterial 5.1 3.5 - 5.3 mmol/L    POCT Chloride, Arterial 101 98 - 107 mmol/L    POCT Ionized Calcium, Arterial 1.07 (L) 1.10 - 1.33 mmol/L    POCT Glucose, Arterial 203 (H) 74 - 99 mg/dL    POCT Lactate, Arterial 0.6 0.4 - 2.0 mmol/L    POCT Base Excess, Arterial -1.5 -2.0 - 3.0 mmol/L    POCT HCO3 Calculated, Arterial 22.0 22.0 - 26.0 mmol/L    POCT Hemoglobin, Arterial 8.0 (L) 12.0 - 16.0 g/dL    POCT Anion Gap, Arterial 11 10 - 25 mmo/L    Patient Temperature 37.0 degrees Celsius    FiO2 30 %   aPTT 2 hours after a single therapeutic aPTT   Result Value Ref Range    aPTT 52 (H) 27 - 38 seconds   POCT GLUCOSE   Result Value Ref Range    POCT Glucose 90 74 - 99 mg/dL   Tacrolimus level   Result Value Ref Range    Tacrolimus  2.9 <=15.0 ng/mL   Creatine Kinase   Result Value Ref Range    Creatine Kinase 181 0 - 215 U/L   Renal function panel   Result Value Ref Range    Glucose 140 (H) 74 - 99 mg/dL    Sodium 132 (L) 136 - 145 mmol/L    Potassium 4.5 3.5 - 5.3 mmol/L    Chloride 101 98 - 107 mmol/L    Bicarbonate 23 21 - 32 mmol/L    Anion Gap 13 10 - 20 mmol/L    Urea Nitrogen 33 (H) 6 - 23 mg/dL    Creatinine 1.81 (H) 0.50 - 1.05 mg/dL    eGFR 38 (L) >60 mL/min/1.73m*2    Calcium 7.8 (L) 8.6 - 10.6 mg/dL    Phosphorus 3.0 2.5 - 4.9 mg/dL    Albumin 3.1 (L) 3.4 - 5.0 g/dL   POCT GLUCOSE   Result Value Ref Range    POCT Glucose 137 (H) 74 - 99 mg/dL   Renal function panel   Result Value Ref Range    Glucose 170 (H) 74 - 99 mg/dL    Sodium 132 (L) 136 - 145 mmol/L    Potassium 4.5 3.5 - 5.3 mmol/L     Chloride 100 98 - 107 mmol/L    Bicarbonate 23 21 - 32 mmol/L    Anion Gap 14 10 - 20 mmol/L    Urea Nitrogen 35 (H) 6 - 23 mg/dL    Creatinine 1.73 (H) 0.50 - 1.05 mg/dL    eGFR 40 (L) >60 mL/min/1.73m*2    Calcium 7.9 (L) 8.6 - 10.6 mg/dL    Phosphorus 2.9 2.5 - 4.9 mg/dL    Albumin 3.0 (L) 3.4 - 5.0 g/dL   Blood Gas Arterial Full Panel   Result Value Ref Range    POCT pH, Arterial 7.51 (H) 7.38 - 7.42 pH    POCT pCO2, Arterial 29 (L) 38 - 42 mm Hg    POCT pO2, Arterial 174 (H) 85 - 95 mm Hg    POCT SO2, Arterial 100 94 - 100 %    POCT Oxy Hemoglobin, Arterial 97.1 94.0 - 98.0 %    POCT Hematocrit Calculated, Arterial 29.0 (L) 36.0 - 46.0 %    POCT Sodium, Arterial 129 (L) 136 - 145 mmol/L    POCT Potassium, Arterial 4.7 3.5 - 5.3 mmol/L    POCT Chloride, Arterial 102 98 - 107 mmol/L    POCT Ionized Calcium, Arterial 1.09 (L) 1.10 - 1.33 mmol/L    POCT Glucose, Arterial 178 (H) 74 - 99 mg/dL    POCT Lactate, Arterial 0.5 0.4 - 2.0 mmol/L    POCT Base Excess, Arterial 0.6 -2.0 - 3.0 mmol/L    POCT HCO3 Calculated, Arterial 23.1 22.0 - 26.0 mmol/L    POCT Hemoglobin, Arterial 9.7 (L) 12.0 - 16.0 g/dL    POCT Anion Gap, Arterial 9 (L) 10 - 25 mmo/L    Patient Temperature 37.0 degrees Celsius    FiO2 30 %   POCT GLUCOSE   Result Value Ref Range    POCT Glucose 171 (H) 74 - 99 mg/dL   Transthoracic Echo (TTE) Limited   Result Value Ref Range    BSA 2.07 m2     *Note: Due to a large number of results and/or encounters for the requested time period, some results have not been displayed. A complete set of results can be found in Results Review.      This patient has a central line   Reason for the central line remaining today? Dialysis/Hemapheresis, Hemodynamic monitoring, and Parenteral medication                 Assessment/Plan   Principal Problem:    Cardiogenic shock (CMS/HCC)  Active Problems:    Heart transplant recipient (CMS/[facility])    Encounter for aftercare following heart transplant (CMS/[facility])    Heart transplant  as cause of abnormal reaction or later complication (CMS/[facility])    1) Cardiogenic Shock              Full ECMO/IABP support   Turn down with echocardiogram today              Low dose epinephrine              Pulling fluid (-5L x 24 hours). Na+ being monitored.  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 CTS/Vasc aware. Close neurovascular monitoring. Bival on.  7) Hx of gastric bypass, colitis  8) Hyponatremia              Close monitoring, overall improving  9) Acute blood loss anemia   No active bleeding  10) Nutrition   Tube feeds  11) Disp              CTICU. Maintain lines. Discussed with Heart Tx and CTS. Palliative medicine consulted. Family updated at bedside.    Due to the high probability of life threatening clinical decompensation, the patient required critical care time evaluating and managing this patient.  Critical care time included obtaining a history, examining the patient, ordering and reviewing studies, discussing, developing, and implementing a management plan, evaluating the patient's response to treatment, and discussion with other care team providers. I saw and evaluated the patient myself. I reviewed the provider's documentation and discussed the patient with the provider. Critical care time was performed exclusive of billable procedures.    Critical Care Time: 40 minutes   Quang Mehta MD

## 2024-02-23 NOTE — CARE PLAN
The patient's goals for the shift include      The clinical goals for the shift include patient will remain safe throughout shift.    Over the shift, the patient did not make progress toward the following goals. Barriers to progression include patient remains intubated at this time and unable to fully follow safety instructions. Recommendations to address these barriers include continue to work toward extubation. Plan to continue to assess need for restraints.    Problem: Safety - Medical Restraint  Goal: Remains free of injury from restraints (Restraint for Interference with Medical Device)  Outcome: Not Progressing  Flowsheets (Taken 2/21/2024 2100 by Tanisha Sears RN)  Remains free of injury from restraints (restraint for interference with medical device):   Determine that other, less restrictive measures have been tried or would not be effective before applying the restraint   Evaluate the patient's condition at the time of restraint application   Inform patient/family regarding the reason for restraint   Every 2 hours: Monitor safety, psychosocial status, comfort, nutrition and hydration  Goal: Free from restraint(s) (Restraint for Interference with Medical Device)  Outcome: Not Progressing

## 2024-02-23 NOTE — PROGRESS NOTES
Chalra Bowles is a 33 y.o. female on day 8 of admission presenting with Cardiogenic shock (CMS/HCC).    Subjective   Patient was assessed to have decreased pulse on left lower leg yesterday. She is tolerating SBT with CPAP, remains intubated. She had net I/O -5 liters while on CVVH. She remains on IABP 1:1 + VA-ECMO and vasopressor support with Epinephrine 0.01 mcg. She is producing 30 ml/hr urine.     Objective     Physical Exam  Constitutional:       Appearance: She is ill-appearing.   HENT:      Head: Normocephalic.      Mouth/Throat:      Mouth: Mucous membranes are moist.      Pharynx: Oropharynx is clear. No oropharyngeal exudate or posterior oropharyngeal erythema.   Eyes:      Conjunctiva/sclera: Conjunctivae normal.      Pupils: Pupils are equal, round, and reactive to light.   Neck:      Vascular: No JVD.   Cardiovascular:      Rate and Rhythm: Normal rate and regular rhythm.      Pulses: Normal pulses.      Heart sounds: Normal heart sounds. No murmur heard.     No friction rub. No gallop.      Comments: Left femoral VA ECMO flowing ~4L/ FIO2 100% w/ Sweep 4. Right femoral IABP in place set 1:1 and augmenting appropriately.   Pulmonary:      Effort: Pulmonary effort is normal. No accessory muscle usage or retractions.      Breath sounds: No wheezing, rhonchi or rales.      Comments: Intubated and breathing comfortably on ventilator. Equal chest rise bilaterally. Thick oral secretions with small amount of blood tinge.   Abdominal:      General: Abdomen is flat. Bowel sounds are normal. There is no distension.      Palpations: Abdomen is soft. There is no mass.      Tenderness: There is no abdominal tenderness. There is no guarding.      Hernia: No hernia is present.   Musculoskeletal:         General: Swelling (Generalized +1-2 edema) present. No tenderness. Normal range of motion.      Cervical back: Full passive range of motion without pain.   Skin:     General: Skin is warm and dry.       "Capillary Refill: Capillary refill takes less than 2 seconds.      Coloration: Skin is not jaundiced.      Findings: No laceration, rash or wound.      Comments: Left leg cannulation site with mild oozing    Neurological:      General: No focal deficit present.      Mental Status: She is alert.      Comments: Intubated and sedated       Last Recorded Vitals  Blood pressure 112/57, pulse (!) 123, temperature 36.7 °C (98.1 °F), temperature source Core, resp. rate (!) 28, height 1.549 m (5' 0.98\"), weight 99.6 kg (219 lb 9.3 oz), SpO2 100 %.  Intake/Output last 3 Shifts:  I/O last 3 completed shifts:  In: 5959.7 (59.8 mL/kg) [P.O.:350; I.V.:4424.7 (44.4 mL/kg); NG/GT:935; IV Piggyback:250]  Out: 31414 (124.3 mL/kg) [Urine:1115 (0.3 mL/kg/hr); Other:05707; Stool:400]  Weight: 99.6 kg     Relevant Results  Scheduled medications  acetaminophen, 650 mg, oral, q6h  [Held by provider] aspirin, 81 mg, oral, Daily  calcitriol, 0.5 mcg, oral, Daily  esomeprazole, 40 mg, nasogastric tube, Daily before breakfast  ferrous sulfate, 60 mg of iron, oral, Daily  insulin lispro, 0-15 Units, subcutaneous, q6h  [START ON 2/24/2024] levothyroxine, 200 mcg, nasogastric tube, Daily  multivitamin with iron-minerals, 15 mL, oral, Daily  nystatin, 5 mL, Swish & Swallow, q6h  perflutren lipid microspheres, 0.5-10 mL of dilution, intravenous, Once in imaging  perflutren protein A microsphere, 0.5 mL, intravenous, Once in imaging  [Held by provider] polyethylene glycol, 17 g, oral, BID  predniSONE, 10 mg, oral, Daily  [Held by provider] rosuvastatin, 40 mg, oral, Nightly  sirolimus, 1 mg, oral, Daily  [Held by provider] sulfamethoxazole-trimethoprim, 80 mg of trimethoprim, oral, Daily  sulfur hexafluoride microsphr, 2 mL, intravenous, Once in imaging  sulfur hexafluoride microsphr, 2 mL, intravenous, Once in imaging  tacrolimus, 2 mg, oral, q12h TRICIA  [Held by provider] valGANciclovir, 450 mg, oral, q48h      Continuous medications  bivalirudin, " 0-0.15 mg/kg/hr, Last Rate: 0.09 mg/kg/hr (02/23/24 1000)  EPINEPHrine, 0.01 mcg/kg/min (Dosing Weight), Last Rate: 0.01 mcg/kg/min (02/23/24 1000)  lactated Ringer's, 5 mL/hr, Last Rate: 5 mL/hr (02/23/24 1000)  lactated Ringer's, 10 mL/hr, Last Rate: Stopped (02/20/24 1715)  PrismaSol 4/2.5, 2,400 mL/hr, Last Rate: 2,400 mL/hr (02/21/24 1653)  propofol, 5-50 mcg/kg/min (Dosing Weight), Last Rate: 35 mcg/kg/min (02/23/24 1000)      PRN medications  PRN medications: calcium gluconate, calcium gluconate, dextrose, dextrose **OR** glucagon, diphenhydrAMINE, glucagon, HYDROmorphone, HYDROmorphone, insulin regular, lactulose, magnesium sulfate, magnesium sulfate, oxygen, potassium chloride CR **OR** potassium chloride, potassium chloride, promethazine    Results for orders placed or performed during the hospital encounter of 02/15/24 (from the past 24 hour(s))   POCT GLUCOSE   Result Value Ref Range    POCT Glucose 144 (H) 74 - 99 mg/dL   Blood Gas Arterial Full Panel   Result Value Ref Range    POCT pH, Arterial 7.39 7.38 - 7.42 pH    POCT pCO2, Arterial 36 (L) 38 - 42 mm Hg    POCT pO2, Arterial 232 (H) 85 - 95 mm Hg    POCT SO2, Arterial 100 94 - 100 %    POCT Oxy Hemoglobin, Arterial 97.6 94.0 - 98.0 %    POCT Hematocrit Calculated, Arterial 29.0 (L) 36.0 - 46.0 %    POCT Sodium, Arterial 127 (L) 136 - 145 mmol/L    POCT Potassium, Arterial 4.8 3.5 - 5.3 mmol/L    POCT Chloride, Arterial 100 98 - 107 mmol/L    POCT Ionized Calcium, Arterial 1.08 (L) 1.10 - 1.33 mmol/L    POCT Glucose, Arterial 117 (H) 74 - 99 mg/dL    POCT Lactate, Arterial 0.7 0.4 - 2.0 mmol/L    POCT Base Excess, Arterial -2.8 (L) -2.0 - 3.0 mmol/L    POCT HCO3 Calculated, Arterial 21.8 (L) 22.0 - 26.0 mmol/L    POCT Hemoglobin, Arterial 9.6 (L) 12.0 - 16.0 g/dL    POCT Anion Gap, Arterial 10 10 - 25 mmo/L    Patient Temperature 37.0 degrees Celsius    FiO2 40 %   Renal function panel   Result Value Ref Range    Glucose 114 (H) 74 - 99 mg/dL     Sodium 129 (L) 136 - 145 mmol/L    Potassium 4.7 3.5 - 5.3 mmol/L    Chloride 97 (L) 98 - 107 mmol/L    Bicarbonate 20 (L) 21 - 32 mmol/L    Anion Gap 17 10 - 20 mmol/L    Urea Nitrogen 31 (H) 6 - 23 mg/dL    Creatinine 2.01 (H) 0.50 - 1.05 mg/dL    eGFR 33 (L) >60 mL/min/1.73m*2    Calcium 7.9 (L) 8.6 - 10.6 mg/dL    Phosphorus 4.0 2.5 - 4.9 mg/dL    Albumin 3.5 3.4 - 5.0 g/dL   CBC during infusion of Bivalrudin   Result Value Ref Range    WBC 10.9 4.4 - 11.3 x10*3/uL    nRBC 2.7 (H) 0.0 - 0.0 /100 WBCs    RBC 3.23 (L) 4.00 - 5.20 x10*6/uL    Hemoglobin 9.0 (L) 12.0 - 16.0 g/dL    Hematocrit 25.5 (L) 36.0 - 46.0 %    MCV 79 (L) 80 - 100 fL    MCH 27.9 26.0 - 34.0 pg    MCHC 35.3 32.0 - 36.0 g/dL    RDW 15.2 (H) 11.5 - 14.5 %    Platelets 66 (L) 150 - 450 x10*3/uL   Magnesium   Result Value Ref Range    Magnesium 2.76 (H) 1.60 - 2.40 mg/dL   Calcium, Ionized   Result Value Ref Range    POCT Calcium, Ionized 1.04 (L) 1.1 - 1.33 mmol/L   Creatine Kinase   Result Value Ref Range    Creatine Kinase 113 0 - 215 U/L   POCT GLUCOSE   Result Value Ref Range    POCT Glucose 85 74 - 99 mg/dL   Transthoracic Echo (TTE) Limited   Result Value Ref Range    LVIDd 4.80 cm    RVSP 25.3 mmHg   POCT GLUCOSE   Result Value Ref Range    POCT Glucose 164 (H) 74 - 99 mg/dL   Renal function panel   Result Value Ref Range    Glucose 218 (H) 74 - 99 mg/dL    Sodium 129 (L) 136 - 145 mmol/L    Potassium 4.8 3.5 - 5.3 mmol/L    Chloride 98 98 - 107 mmol/L    Bicarbonate 22 21 - 32 mmol/L    Anion Gap 14 10 - 20 mmol/L    Urea Nitrogen 34 (H) 6 - 23 mg/dL    Creatinine 2.26 (H) 0.50 - 1.05 mg/dL    eGFR 29 (L) >60 mL/min/1.73m*2    Calcium 7.9 (L) 8.6 - 10.6 mg/dL    Phosphorus 4.4 2.5 - 4.9 mg/dL    Albumin 3.0 (L) 3.4 - 5.0 g/dL   aPTT 2 hours after initiation of Bivalrudin   Result Value Ref Range    aPTT 45 (H) 27 - 38 seconds   Creatine Kinase   Result Value Ref Range    Creatine Kinase 114 0 - 215 U/L   Blood Gas Arterial Full Panel    Result Value Ref Range    POCT pH, Arterial 7.43 (H) 7.38 - 7.42 pH    POCT pCO2, Arterial 32 (L) 38 - 42 mm Hg    POCT pO2, Arterial 220 (H) 85 - 95 mm Hg    POCT SO2, Arterial 100 94 - 100 %    POCT Oxy Hemoglobin, Arterial 97.5 94.0 - 98.0 %    POCT Hematocrit Calculated, Arterial 32.0 (L) 36.0 - 46.0 %    POCT Sodium, Arterial 126 (L) 136 - 145 mmol/L    POCT Potassium, Arterial 4.9 3.5 - 5.3 mmol/L    POCT Chloride, Arterial 98 98 - 107 mmol/L    POCT Ionized Calcium, Arterial 1.08 (L) 1.10 - 1.33 mmol/L    POCT Glucose, Arterial 212 (H) 74 - 99 mg/dL    POCT Lactate, Arterial 0.6 0.4 - 2.0 mmol/L    POCT Base Excess, Arterial -2.5 (L) -2.0 - 3.0 mmol/L    POCT HCO3 Calculated, Arterial 21.2 (L) 22.0 - 26.0 mmol/L    POCT Hemoglobin, Arterial 10.5 (L) 12.0 - 16.0 g/dL    POCT Anion Gap, Arterial 12 10 - 25 mmo/L    Patient Temperature 37.0 degrees Celsius    FiO2 40 %   POCT GLUCOSE   Result Value Ref Range    POCT Glucose 214 (H) 74 - 99 mg/dL   Blood Gas Arterial Full Panel   Result Value Ref Range    POCT pH, Arterial 7.38 7.38 - 7.42 pH    POCT pCO2, Arterial 37 (L) 38 - 42 mm Hg    POCT pO2, Arterial 178 (H) 85 - 95 mm Hg    POCT SO2, Arterial 100 94 - 100 %    POCT Oxy Hemoglobin, Arterial 97.1 94.0 - 98.0 %    POCT Hematocrit Calculated, Arterial 26.0 (L) 36.0 - 46.0 %    POCT Sodium, Arterial 129 (L) 136 - 145 mmol/L    POCT Potassium, Arterial 4.3 3.5 - 5.3 mmol/L    POCT Chloride, Arterial 101 98 - 107 mmol/L    POCT Ionized Calcium, Arterial 1.14 1.10 - 1.33 mmol/L    POCT Glucose, Arterial 102 (H) 74 - 99 mg/dL    POCT Lactate, Arterial 0.8 0.4 - 2.0 mmol/L    POCT Base Excess, Arterial -2.9 (L) -2.0 - 3.0 mmol/L    POCT HCO3 Calculated, Arterial 21.9 (L) 22.0 - 26.0 mmol/L    POCT Hemoglobin, Arterial 8.5 (L) 12.0 - 16.0 g/dL    POCT Anion Gap, Arterial 10 10 - 25 mmo/L    Patient Temperature 37.0 degrees Celsius    FiO2 30 %   POCT GLUCOSE   Result Value Ref Range    POCT Glucose 92 74 - 99  mg/dL   aPTT 2 hours after a single therapeutic aPTT   Result Value Ref Range    aPTT 47 (H) 27 - 38 seconds   Lactate Dehydrogenase   Result Value Ref Range     (H) 84 - 246 U/L   Hepatic function panel   Result Value Ref Range    Albumin 3.2 (L) 3.4 - 5.0 g/dL    Bilirubin, Total 0.6 0.0 - 1.2 mg/dL    Bilirubin, Direct 0.2 0.0 - 0.3 mg/dL    Alkaline Phosphatase 69 33 - 110 U/L     (H) 7 - 45 U/L     (H) 9 - 39 U/L    Total Protein 5.6 (L) 6.4 - 8.2 g/dL   Type and screen   Result Value Ref Range    ABO TYPE O     Rh TYPE POS     ANTIBODY SCREEN NEG    Creatine Kinase   Result Value Ref Range    Creatine Kinase 153 0 - 215 U/L   Renal function panel   Result Value Ref Range    Glucose 177 (H) 74 - 99 mg/dL    Sodium 129 (L) 136 - 145 mmol/L    Potassium 4.9 3.5 - 5.3 mmol/L    Chloride 98 98 - 107 mmol/L    Bicarbonate 20 (L) 21 - 32 mmol/L    Anion Gap 16 10 - 20 mmol/L    Urea Nitrogen 33 (H) 6 - 23 mg/dL    Creatinine 1.95 (H) 0.50 - 1.05 mg/dL    eGFR 35 (L) >60 mL/min/1.73m*2    Calcium 7.7 (L) 8.6 - 10.6 mg/dL    Phosphorus 3.7 2.5 - 4.9 mg/dL    Albumin 3.2 (L) 3.4 - 5.0 g/dL   Calcium, Ionized   Result Value Ref Range    POCT Calcium, Ionized 1.02 (L) 1.1 - 1.33 mmol/L   CBC   Result Value Ref Range    WBC 10.2 4.4 - 11.3 x10*3/uL    nRBC 2.2 (H) 0.0 - 0.0 /100 WBCs    RBC 2.88 (L) 4.00 - 5.20 x10*6/uL    Hemoglobin 8.0 (L) 12.0 - 16.0 g/dL    Hematocrit 23.2 (L) 36.0 - 46.0 %    MCV 81 80 - 100 fL    MCH 27.8 26.0 - 34.0 pg    MCHC 34.5 32.0 - 36.0 g/dL    RDW 15.5 (H) 11.5 - 14.5 %    Platelets 48 (L) 150 - 450 x10*3/uL   Magnesium   Result Value Ref Range    Magnesium 2.59 (H) 1.60 - 2.40 mg/dL   POCT GLUCOSE   Result Value Ref Range    POCT Glucose 165 (H) 74 - 99 mg/dL   aPTT 2 hours after any Bivalrudin dosage change   Result Value Ref Range    aPTT 51 (H) 27 - 38 seconds   Blood Gas Arterial Full Panel   Result Value Ref Range    POCT pH, Arterial 7.46 (H) 7.38 - 7.42 pH     POCT pCO2, Arterial 31 (L) 38 - 42 mm Hg    POCT pO2, Arterial 185 (H) 85 - 95 mm Hg    POCT SO2, Arterial 100 94 - 100 %    POCT Oxy Hemoglobin, Arterial 97.8 94.0 - 98.0 %    POCT Hematocrit Calculated, Arterial 24.0 (L) 36.0 - 46.0 %    POCT Sodium, Arterial 129 (L) 136 - 145 mmol/L    POCT Potassium, Arterial 5.1 3.5 - 5.3 mmol/L    POCT Chloride, Arterial 101 98 - 107 mmol/L    POCT Ionized Calcium, Arterial 1.07 (L) 1.10 - 1.33 mmol/L    POCT Glucose, Arterial 203 (H) 74 - 99 mg/dL    POCT Lactate, Arterial 0.6 0.4 - 2.0 mmol/L    POCT Base Excess, Arterial -1.5 -2.0 - 3.0 mmol/L    POCT HCO3 Calculated, Arterial 22.0 22.0 - 26.0 mmol/L    POCT Hemoglobin, Arterial 8.0 (L) 12.0 - 16.0 g/dL    POCT Anion Gap, Arterial 11 10 - 25 mmo/L    Patient Temperature 37.0 degrees Celsius    FiO2 30 %   aPTT 2 hours after a single therapeutic aPTT   Result Value Ref Range    aPTT 52 (H) 27 - 38 seconds   POCT GLUCOSE   Result Value Ref Range    POCT Glucose 90 74 - 99 mg/dL   Tacrolimus level   Result Value Ref Range    Tacrolimus  2.9 <=15.0 ng/mL   Creatine Kinase   Result Value Ref Range    Creatine Kinase 181 0 - 215 U/L   Renal function panel   Result Value Ref Range    Glucose 140 (H) 74 - 99 mg/dL    Sodium 132 (L) 136 - 145 mmol/L    Potassium 4.5 3.5 - 5.3 mmol/L    Chloride 101 98 - 107 mmol/L    Bicarbonate 23 21 - 32 mmol/L    Anion Gap 13 10 - 20 mmol/L    Urea Nitrogen 33 (H) 6 - 23 mg/dL    Creatinine 1.81 (H) 0.50 - 1.05 mg/dL    eGFR 38 (L) >60 mL/min/1.73m*2    Calcium 7.8 (L) 8.6 - 10.6 mg/dL    Phosphorus 3.0 2.5 - 4.9 mg/dL    Albumin 3.1 (L) 3.4 - 5.0 g/dL   POCT GLUCOSE   Result Value Ref Range    POCT Glucose 137 (H) 74 - 99 mg/dL   Renal function panel   Result Value Ref Range    Glucose 170 (H) 74 - 99 mg/dL    Sodium 132 (L) 136 - 145 mmol/L    Potassium 4.5 3.5 - 5.3 mmol/L    Chloride 100 98 - 107 mmol/L    Bicarbonate 23 21 - 32 mmol/L    Anion Gap 14 10 - 20 mmol/L    Urea Nitrogen 35 (H) 6  - 23 mg/dL    Creatinine 1.73 (H) 0.50 - 1.05 mg/dL    eGFR 40 (L) >60 mL/min/1.73m*2    Calcium 7.9 (L) 8.6 - 10.6 mg/dL    Phosphorus 2.9 2.5 - 4.9 mg/dL    Albumin 3.0 (L) 3.4 - 5.0 g/dL   Blood Gas Arterial Full Panel   Result Value Ref Range    POCT pH, Arterial 7.51 (H) 7.38 - 7.42 pH    POCT pCO2, Arterial 29 (L) 38 - 42 mm Hg    POCT pO2, Arterial 174 (H) 85 - 95 mm Hg    POCT SO2, Arterial 100 94 - 100 %    POCT Oxy Hemoglobin, Arterial 97.1 94.0 - 98.0 %    POCT Hematocrit Calculated, Arterial 29.0 (L) 36.0 - 46.0 %    POCT Sodium, Arterial 129 (L) 136 - 145 mmol/L    POCT Potassium, Arterial 4.7 3.5 - 5.3 mmol/L    POCT Chloride, Arterial 102 98 - 107 mmol/L    POCT Ionized Calcium, Arterial 1.09 (L) 1.10 - 1.33 mmol/L    POCT Glucose, Arterial 178 (H) 74 - 99 mg/dL    POCT Lactate, Arterial 0.5 0.4 - 2.0 mmol/L    POCT Base Excess, Arterial 0.6 -2.0 - 3.0 mmol/L    POCT HCO3 Calculated, Arterial 23.1 22.0 - 26.0 mmol/L    POCT Hemoglobin, Arterial 9.7 (L) 12.0 - 16.0 g/dL    POCT Anion Gap, Arterial 9 (L) 10 - 25 mmo/L    Patient Temperature 37.0 degrees Celsius    FiO2 30 %   POCT GLUCOSE   Result Value Ref Range    POCT Glucose 171 (H) 74 - 99 mg/dL   Transthoracic Echo (TTE) Limited   Result Value Ref Range    BSA 2.07 m2     *Note: Due to a large number of results and/or encounters for the requested time period, some results have not been displayed. A complete set of results can be found in Results Review.         Assessment/Plan   Assessment: Ms. 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    Originally presented to [facility] ED on 2/15/24 with complaints of N/V x 3 days and  inability to keep medications down. POCUS revealed acute systolic heart failure w/ severe BIV dysfunction when compared to previous images in 11/2023. Also noted to have HIRAM and transaminitis. Immunosuppression notably below therapeutic range. Admitted to HFICU and treated for suspected acute cellular vs. acute antibody mediated rejection; however, cardiac biopsy negative for signs of rejection. Despite rejection therapy, inotropes and MCS via IABP, patient's end organ function continued to worsen without improvement in cardiac function. Ultimately placed on left femoral VA ECMO w/ Dr. Marina on 2/19/2024 and transferred to CTICU.     CTICU course complicated by anemia requiring blood product transfusions, volume overload & intubation (tachypnea and increased work of breathing 2/2 pulmonary edema).     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   #Acute renal failure 2/2 to cardiorenal syndrome   #Acute transaminitis     Donor/Recipient Infectious history:  CMV: -/+ (last collected 2/16/24)  Toxo: -/-   Hep C: -/-    Rejection/Prophylaxis (transplants):  - Steroids: Prednisone taper started 2/18/24 following 3xmethylprednisone pulse: Decrease daily, PO prednisone 60mg, 50mg, 40mg, 30mg, 20mg and then continue on 10mg daily   - Tacrolimus: 2.0mg PO @ 0630 & 2.0mg PO @ 1830 w/ daily levels drawn @ 0600  - Serolimus: 1.0mg PO daily w/ daily levels drawn at 0600  - Combined sirolimus/tacrolimus goal of 10-12  - Antifungals: nystatin 500,000units Q6  - Antivirals: valcyte 450mg Q48hrs --> On hold (2/23)  - Anti PCP & Toxoplasmosis: Bactrim SS daily  --> On hold (2/23)    Last cardiac biopsy: 2/16/24 with ACR1 and no AMR  Last HLA: 2/16/24 negative for DSAs   Last RHC: 2/16/24 w/ RA 11, PAP 34/14(23), W 19 and C.I. 2.3  Last LHC: 2/18/24 negative for CAV and CAD   Last TTE/BOB: 2/20/24 continue to show severe BIV dysfunction   Osteopenia/osteoporosis  prophylaxis: Vitamin D3 and calcium supplements  Peptic/gastric ulcer prophylaxis: Pantoprazole 40mg daily   CAV Prophylaxis: Hold Aspirin 81mg daily while on systemic heparin. Okay to resume pravastatin      - Unclear cause of acute severe graft dysfunction. Differentials include: acute ACR vs. AMR vs. viral myocarditis vs. acute lupus myocarditis vs. Vasculitis; however, 2xallograft biopsies on 2/16 & 2/20 remain negative for any significant pathology. Notably, both biopsies taken after rejection therapies implemented which may have reduced areas of graft damage.    - DSAs remain negative; however, patient may have non-HLA antibodies present. 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   - Negative CAV & CAD via LHC on 2/18/24   - Remains on MCS via right femoral IABP set 1:1 and augmenting appropriately & left femoral VA ECMO w/ appropriate flows ~4L/FIO2 100% and sweep 1.    - Remains intubated due to pulmonary edema   - Remains on CVVH due to ARF   - LFTs improved and lactate normalized.    - Completed methylpred steroid pulse w/ 1g Q24 x 3 days (2/16-2/18)  - Thymoglobulin doses: 2/18 & 2/19   - IVIG + PLEX Session: 2/18 & 2/20   - No further thymo, PLEX or IVIG treatment at this time with multiple biopsies negative for ACR & AMR   - Patient does have improved pulsatility with epinephrine infusion in place. Chest x-ray also shows improved lung fields with volume removal. Lastly, patient now producing UOP and signs of renal recovery.        Plan/Recommendations:  - Turndown trial of VA-ECMO today  - Possible extubation.  - Continue Prednisone 10mg daily   - Increase dose of Tacrolimus 2.5 mg BID. Latest tacrolimus trough level 2.90  - Continue Sirolimus 1 mg every day.   - Thrombocytopenia likely multifactorial from recent thymoglobulin, MCS & CVVH. Continue to hold aspirin and agree with PF4 to rule out HIT.   - Please obtain repeat echo  - Continue CVVH with target net -3 liters.  - Hold Valcyte and  Bactrim for now.   - Send CMV PCR today.   - At this time due to the patient's critical illness she is not a candidate for single or duel organ transplantation. Will continue to discuss as course progresses.    Patient remains critically ill with multisystem organ failure requiring VA ECMO, IABP, CVVH and intubation. Appreciate continued CTICU care/management.    Seen with the Heart Failure/Heart Transplant Attending, Dr. Del Rosario & CTICU team     Heart Transplant Team:   [employer] Secure Chat  [id number]    Nasir Piper MD, PGY-4

## 2024-02-23 NOTE — PROGRESS NOTES
Charla Bowles is a 33 y.o. female on day 8 of admission presenting with Cardiogenic shock (CMS/HCC).    Subjective   No events overnight       Objective   Last Recorded Vitals  Heart Rate:  [106-122]   Temp:  [36.7 °C (98.1 °F)-36.9 °C (98.4 °F)]   Resp:  [13-35]   SpO2:  [91 %-100 %]     Intake/Output last 3 Shifts:  I/O last 3 completed shifts:  In: 5959.7 (59.8 mL/kg) [P.O.:350; I.V.:4424.7 (44.4 mL/kg); NG/GT:935; IV Piggyback:250]  Out: 82821 (124.3 mL/kg) [Urine:1115 (0.3 mL/kg/hr); Other:57791; Stool:400]  Weight: 99.6 kg     AA female, lying in bed. Intubated and sedated  Sinus tachy on monitor  Satting well on current ECMO and vent settings  Abdomen soft, nontender  R groin with IABP in place, L groin with venous and arterial cannulae in place. Bilateral LE warm to knees, with gradient coolness to foot where she is cool bilaterally.   Multiphasic popliteal and PT signal RLE, difficult to obtain L pop signal vs. Venous signal.     Relevant Results  Lab Results   Component Value Date    WBC 10.2 02/22/2024    HGB 8.0 (L) 02/22/2024    HCT 23.2 (L) 02/22/2024    MCV 81 02/22/2024    PLT 48 (L) 02/22/2024     Lab Results   Component Value Date    GLUCOSE 170 (H) 02/23/2024    CALCIUM 7.9 (L) 02/23/2024     (L) 02/23/2024    K 4.5 02/23/2024    CO2 23 02/23/2024     02/23/2024    BUN 35 (H) 02/23/2024    CREATININE 1.73 (H) 02/23/2024       Active Medications  Scheduled medications  acetaminophen, 650 mg, oral, q6h  [Held by provider] aspirin, 81 mg, oral, Daily  calcitriol, 0.5 mcg, oral, Daily  esomeprazole, 40 mg, nasogastric tube, Daily before breakfast  ferrous sulfate, 60 mg of iron, oral, Daily  insulin lispro, 0-15 Units, subcutaneous, q4h  [START ON 2/24/2024] levothyroxine, 200 mcg, nasogastric tube, Daily  multivitamin with iron-minerals, 15 mL, oral, Daily  nystatin, 5 mL, Swish & Swallow, q6h  perflutren lipid microspheres, 0.5-10 mL of dilution, intravenous, Once in  imaging  perflutren protein A microsphere, 0.5 mL, intravenous, Once in imaging  [Held by provider] polyethylene glycol, 17 g, oral, BID  predniSONE, 10 mg, oral, Daily  [Held by provider] rosuvastatin, 40 mg, oral, Nightly  sirolimus, 1 mg, oral, Daily  [Held by provider] sulfamethoxazole-trimethoprim, 80 mg of trimethoprim, oral, Daily  sulfur hexafluoride microsphr, 2 mL, intravenous, Once in imaging  sulfur hexafluoride microsphr, 2 mL, intravenous, Once in imaging  tacrolimus, 2 mg, oral, q12h TRICIA  [Held by provider] valGANciclovir, 450 mg, oral, q48h      Continuous medications  bivalirudin, 0-0.15 mg/kg/hr, Last Rate: 0.09 mg/kg/hr (02/23/24 1000)  EPINEPHrine, 0.01 mcg/kg/min (Dosing Weight), Last Rate: 0.01 mcg/kg/min (02/23/24 1000)  insulin regular infusion for cardiac surgery, 0-15 Units/hr, Last Rate: Stopped (02/23/24 0430)  lactated Ringer's, 5 mL/hr, Last Rate: 5 mL/hr (02/23/24 1000)  lactated Ringer's, 10 mL/hr, Last Rate: Stopped (02/20/24 1715)  PrismaSol 4/2.5, 2,400 mL/hr, Last Rate: 2,400 mL/hr (02/21/24 1653)  propofol, 5-50 mcg/kg/min (Dosing Weight), Last Rate: 35 mcg/kg/min (02/23/24 1000)      PRN medications  PRN medications: calcium gluconate, calcium gluconate, dextrose, dextrose **OR** glucagon, diphenhydrAMINE, glucagon, HYDROmorphone, HYDROmorphone, insulin regular, lactulose, magnesium sulfate, magnesium sulfate, oxygen, potassium chloride CR **OR** potassium chloride, potassium chloride, promethazine     Assessment/Plan   Principal Problem:    Cardiogenic shock (CMS/HCC)  Active Problems:    Heart transplant recipient (CMS/[facility])    Encounter for aftercare following heart transplant (CMS/[facility])    Heart transplant as cause of abnormal reaction or later complication (CMS/[facility])    33F PMHx OHT March 2022 admitted with cardiogenic shock related to allograft rejection. Currently on ECMO and Impella support (ECMO VV L groin, R CFA IABP) placed 2/19. 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 LLE not frankly cold, however cool and without detectable signals.    2/23: Roughly stable exam. Coolness/temperature gradient stable, difficulty getting L pop signal. Still remains on full MCS    Plan:  Vasc lab arterial duplex to assess LLE flow (ordered)  Would recommend flushing reperfusion cannula to assess flow and patency and ensure this is functioning    Discussed with Dr. Emory Subramanian MD  PGY5 - Integrated Vascular Surgery  [id number]

## 2024-02-23 NOTE — CARE PLAN
The patient's goals for the shift include  maintaining hemodynamics while achieving a negative 2L fluid balance.    The clinical goals for the shift include maintaining MAPs of .    Over the shift, the patient did make progress toward the following goals. Patient maintained hemodynamic stability while being 4L negative.     Problem: Pain  Goal: Walks with improved pain control throughout the shift  Outcome: Not Progressing  Goal: Performs ADL's with improved pain control throughout shift  Outcome: Not Progressing  Goal: Participates in PT with improved pain control throughout the shift  Outcome: Not Progressing     Problem: Skin  Goal: Participates in plan/prevention/treatment measures  Outcome: Not Progressing     Problem: Safety - Medical Restraint  Goal: Free from restraint(s) (Restraint for Interference with Medical Device)  Outcome: Not Progressing

## 2024-02-23 NOTE — PROGRESS NOTES
"Charla Bowles is a 33 y.o. female on day 8 of admission presenting with Cardiogenic shock (CMS/HCC).    Subjective   Pt seen and examined. She remains sedated and prednisone 10mg, continuous TF at 25cc/hr, epi gtt and bivalrudin gtt - which all contribute to hyperglycemia.  She was started on insulin gtt yesterday by primary team, though her insulin requriements remain modest and gtt is often held after 4-5 hours of use due to glucose <100mg/dL.      I have reviewed histories, allergies and medications have been reviewed and there are no changes       Objective   Review of Systems   Reason unable to perform ROS: sedated.     Physical Exam  Constitutional:       Appearance: She is obese. She is ill-appearing.   HENT:      Head: Normocephalic and atraumatic.      Nose: Nose normal.      Mouth/Throat:      Mouth: Mucous membranes are dry.      Pharynx: Oropharynx is clear.   Eyes:      Extraocular Movements: Extraocular movements intact.      Conjunctiva/sclera: Conjunctivae normal.   Neck:      Comments: Catheter in place on R side  Cardiovascular:      Rate and Rhythm: Regular rhythm. Tachycardia present.   Pulmonary:      Effort: Pulmonary effort is normal.      Breath sounds: Normal breath sounds.   Abdominal:      General: Abdomen is flat. Bowel sounds are normal.      Palpations: Abdomen is soft.   Musculoskeletal:      Right lower leg: Edema present.      Left lower leg: Edema present.   Skin:     General: Skin is warm and dry.   Neurological:      Comments: Unable to assess   Psychiatric:      Comments: Unable to assess         Last Recorded Vitals  Blood pressure 112/57, pulse (!) 113, temperature 36.7 °C (98.1 °F), temperature source Core, resp. rate 22, height 1.549 m (5' 0.98\"), weight 99.6 kg (219 lb 9.3 oz), SpO2 100 %.  Intake/Output last 3 Shifts:  I/O last 3 completed shifts:  In: 5959.7 (59.8 mL/kg) [P.O.:350; I.V.:4424.7 (44.4 mL/kg); NG/GT:935; IV Piggyback:250]  Out: 08457 (124.3 mL/kg) " [Urine:1115 (0.3 mL/kg/hr); Other:01071; Stool:400]  Weight: 99.6 kg     Relevant Results  Results from last 7 days   Lab Units 02/23/24  0555 02/23/24  0550 02/23/24  0428 02/22/24  2350 02/22/24  2343 02/22/24  2050 02/22/24  1826 02/22/24  1817 02/22/24  1458 02/22/24  1433 02/22/24  1009 02/22/24  1002   POCT GLUCOSE mg/dL 137*  --  90 165*  --  92 214*  --    < >  --    < >  --    GLUCOSE mg/dL  --  140*  --   --  177*  --   --  218*  --  114*  --  207*    < > = values in this interval not displayed.       Lab Review  Lab Results   Component Value Date    BILITOT 0.6 02/22/2024    CALCIUM 7.8 (L) 02/23/2024    CO2 23 02/23/2024     02/23/2024    CREATININE 1.81 (H) 02/23/2024    GLUCOSE 140 (H) 02/23/2024    ALKPHOS 69 02/22/2024    K 4.5 02/23/2024    PROT 5.6 (L) 02/22/2024     (L) 02/23/2024     (H) 02/22/2024     (H) 02/22/2024    BUN 33 (H) 02/23/2024    ANIONGAP 13 02/23/2024    MG 2.59 (H) 02/22/2024    PHOS 3.0 02/23/2024     (H) 02/22/2024    ALBUMIN 3.1 (L) 02/23/2024    LIPASE 8 (L) 03/17/2023    GFRF 74 09/27/2023     Lab Results   Component Value Date    TRIG 142 02/16/2024    CHOL 162 02/16/2024    LDLCALC 94 02/16/2024    HDL 39.2 02/16/2024     Lab Results   Component Value Date    HGBA1C 6.3 (H) 02/16/2024    HGBA1C 5.7 (A) 03/17/2023    HGBA1C 6.4 (A) 12/14/2022     The ASCVD Risk score (Toño CARSON, et al., 2019) failed to calculate for the following reasons:    The 2019 ASCVD risk score is only valid for ages 40 to 79  Scheduled medications  acetaminophen, 650 mg, oral, q6h  [Held by provider] aspirin, 81 mg, oral, Daily  calcitriol, 0.5 mcg, oral, Daily  esomeprazole, 40 mg, nasogastric tube, Daily before breakfast  ferrous sulfate, 60 mg of iron, oral, Daily  insulin lispro, 0-15 Units, subcutaneous, q4h  multivitamin with iron-minerals, 15 mL, oral, Daily  nystatin, 5 mL, Swish & Swallow, q6h  perflutren lipid microspheres, 0.5-10 mL of dilution,  intravenous, Once in imaging  perflutren protein A microsphere, 0.5 mL, intravenous, Once in imaging  [Held by provider] polyethylene glycol, 17 g, oral, BID  predniSONE, 10 mg, oral, Daily  [Held by provider] rosuvastatin, 40 mg, oral, Nightly  sirolimus, 1 mg, oral, Daily  [Held by provider] sulfamethoxazole-trimethoprim, 80 mg of trimethoprim, oral, Daily  sulfur hexafluoride microsphr, 2 mL, intravenous, Once in imaging  sulfur hexafluoride microsphr, 2 mL, intravenous, Once in imaging  tacrolimus, 2 mg, oral, q12h TRICIA  [Held by provider] valGANciclovir, 450 mg, oral, q48h      Continuous medications  bivalirudin, 0-0.15 mg/kg/hr, Last Rate: 0.09 mg/kg/hr (02/23/24 0800)  EPINEPHrine, 0.01 mcg/kg/min (Dosing Weight), Last Rate: 0.01 mcg/kg/min (02/23/24 0800)  insulin regular infusion for cardiac surgery, 0-15 Units/hr, Last Rate: Stopped (02/23/24 0430)  lactated Ringer's, 5 mL/hr, Last Rate: 5 mL/hr (02/23/24 0800)  lactated Ringer's, 10 mL/hr, Last Rate: Stopped (02/20/24 1715)  PrismaSol 4/2.5, 2,400 mL/hr, Last Rate: 2,400 mL/hr (02/21/24 1653)  propofol, 5-50 mcg/kg/min (Dosing Weight), Last Rate: 35 mcg/kg/min (02/23/24 0800)      PRN medications  PRN medications: calcium gluconate, calcium gluconate, dextrose, dextrose **OR** glucagon, diphenhydrAMINE, glucagon, HYDROmorphone, HYDROmorphone, insulin regular, lactulose, magnesium sulfate, magnesium sulfate, oxygen, potassium chloride CR **OR** potassium chloride, potassium chloride, promethazine        Assessment/Plan   Principal Problem:    Cardiogenic shock (CMS/HCC)  Active Problems:    Heart transplant recipient (CMS/HCC)    Encounter for aftercare following heart transplant (CMS/HCC)    Heart transplant as cause of abnormal reaction or later complication (CMS/HCC)    PreDM2 with solumedrol induced hyperglycemia in setting of heart allograft rejection     History Of Present Illness  Charla Geller Jelena is a 33 y.o. female presenting with PMHx of  stage D HFrEF (PPCM) s/p ICD s/p CardioMEMs, hypothyroidism 2/2 thyroidectomy on replacement therapy, obesity s/p gastric bypass (2017), and SLE who is s/p OHT (3/31/2022) with a post-OHT course complicated by RIJ/RUE DVTs, leukopenia, left groin seroma, worsening renal function, asymptomatic 2R ACR (11/2022) treated with steroids, and thrush who presented to [facility] ED for nausea and vomiting. Given patient's severely reduced EF patient was started on a milrinone drip and levophed was also started due to hypotension. CXR was obtained which showed an enlarged cardiac silhouette. Pt. Was admitted to the HFICU for cardiogenic shock and concern for acute rejection.    Resumed home medications and pulse dose steroids x3 days as solumedrol 1g q24. PO steroid taper TBD by heart biopsy results. Likely pred 40-60mg starting.      Diabetes history  -A1C historically remains <6.5% in preDM range   - pt did experience steroid induced hyperglycemia at time of her initial OHT in 3/2022  - on no glucose control meds at home        PLAN:    2/15: start solumedrol 1g pulse q24hr x3 days  2/18: start prednisone 60mg PO  2/19: pred 50mg  2/20: pred 40mg  2/21: pred 30mg  2/22: pred 20mg  2/23: pred 10mg, then stop    - Goal BG <180    - consider stopping insulin gtt order (pt has been getting insulin gtt in 4-5 hour bursts. This matches the duration of action of regular insulin and similar glucose control can be more simply achieved with q6hr injected corrective insulin)     - if gtt is stopped, resume ssi with regular insulin corrective scale to #3 q6h   - if glucose >300 mg/dl, please start pt on insulin gtt per [facility] protocol      -Accuchecks q6h  -Hypoglycemia protocol  - pt is NPO and now on continuous TF   -Will continue to follow and titrate insulin accordingly     Dispo:  pt may MDI insulin at discharge to address her prednisone taper - exact dose TBD by titration.  SGLT2i tx would benefit both CHF and CKD once tacrolimus  levels are returned to maintenance regimen - likely 4-6 months after this allograft rejection episode.    - pt's  Endocrinologist moved and she will need to re-establish endocrine care for hypothyroidism and follow up on steroid induced hyperglycemia, appointment requested for post transplant clinic with Elisabeth Acevedo PA-C in [place] [id number] on 4/22/24     I spent 35 minutes in the professional and overall care of this patient.      Elisabeth Acevedo PA-C

## 2024-02-24 NOTE — PROGRESS NOTES
[facility]  ADVANCED HEART FAILURE AND TRANSPLANT CARDIOLOGY   Charla Bowles/[id number]    Admit Date: 2/15/2024  Hospital Length of Stay: 9   ICU Length of Stay: 4d 15h   Primary Service: CTICU    Charla Bowles is a 33 y.o. female on day 9 of admission presenting with Cardiogenic shock (CMS/HCC).    INTERVAL EVENTS / PERTINENT ROS:    She remains hemodynamically supported with IABP 1:1 + VA-ECMO and vasopressor support with Epinephrine increased from 0.01mcg to 0.04 mcg. On ECMO wean down trial yesterday, LV systolic function remained significantly decreased ~10-15%. Overnight she remains intubated due to increased tachypnea, plan is for SAT/SBT later today. Left lower extremity duplex yesterday revealed patent arterial system with low flow. She continues with CVVH for fluid removal, goal today is 1L net negative. She did require pRBC transfusion for hemoglobin <7.0 this morning, though no obvious source of bleeding.    Objective     Physical Exam  Constitutional:       Appearance: She is ill-appearing.   HENT:      Head: Normocephalic.   Cardiovascular:      Rate and Rhythm: Normal rate and regular rhythm.      Pulses: Normal pulses.      Heart sounds: Normal heart sounds. No murmur heard.     No friction rub. No gallop.      Comments: Left femoral VA ECMO flowing ~3.3L/ FIO2 100%. Right femoral IABP in place set 1:1 and augmenting appropriately.   Pulmonary:      Effort: Pulmonary effort is normal. No accessory muscle usage or retractions.      Breath sounds: No wheezing, rhonchi or rales.      Comments: Intubated and breathing comfortably on ventilator. Equal chest rise bilaterally. Thick oral secretions with small amount of blood tinge.   Abdominal:      General: Abdomen is flat. Bowel sounds are normal. There is no distension.      Palpations: Abdomen is soft. There is no mass.      Tenderness: There is no abdominal tenderness. There is no guarding.      Hernia: No  "hernia is present.   Musculoskeletal:         General: Swelling (Generalized +1-2 edema) present. No tenderness. Normal range of motion.      Cervical back: Full passive range of motion without pain.   Skin:     General: Skin is warm and dry.      Capillary Refill: Capillary refill takes less than 2 seconds.      Coloration: Skin is not jaundiced.      Findings: No laceration, rash or wound.      Comments: Left leg cannulation site with mild oozing    Neurological:      General: No focal deficit present.      Comments: Intubated and sedated       Last Recorded Vitals  Blood pressure 131/69, pulse (!) 119, temperature 36.7 °C (98.1 °F), temperature source Core, resp. rate 16, height 1.549 m (5' 0.98\"), weight 91.7 kg (202 lb 2.6 oz), SpO2 98 %.  Intake/Output last 3 Shifts:  I/O last 3 completed shifts:  In: 3672.4 (40 mL/kg) [P.O.:25; I.V.:2007.4 (21.9 mL/kg); Blood:475; NG/GT:1015; IV Piggyback:150]  Out: 9545 (104.1 mL/kg) [Urine:535 (0.2 mL/kg/hr); Other:8010; Stool:1000]  Weight: 91.7 kg     Relevant Results  Scheduled medications  albumin human, 25 g, intravenous, q6h  [Held by provider] aspirin, 81 mg, oral, Daily  calcitriol, 0.5 mcg, oral, Daily  esomeprazole, 40 mg, nasogastric tube, Daily before breakfast  ferrous sulfate, 60 mg of iron, oral, Daily  insulin lispro, 0-15 Units, subcutaneous, q6h  levothyroxine, 200 mcg, nasogastric tube, Daily  multivitamin with iron-minerals, 15 mL, oral, Daily  nystatin, 5 mL, Swish & Swallow, q6h  perflutren lipid microspheres, 0.5-10 mL of dilution, intravenous, Once in imaging  perflutren protein A microsphere, 0.5 mL, intravenous, Once in imaging  [Held by provider] polyethylene glycol, 17 g, oral, BID  predniSONE, 10 mg, oral, Daily  [Held by provider] rosuvastatin, 40 mg, oral, Nightly  sirolimus, 1 mg, oral, Daily  sodium phosphate, 21 mmol, intravenous, Once  [Held by provider] sulfamethoxazole-trimethoprim, 80 mg of trimethoprim, oral, Daily  sulfur hexafluoride " microsphr, 2 mL, intravenous, Once in imaging  sulfur hexafluoride microsphr, 2 mL, intravenous, Once in imaging  tacrolimus, 2.5 mg, oral, q12h TRICIA  [Held by provider] valGANciclovir, 450 mg, oral, q48h      Continuous medications  bivalirudin, 0-0.15 mg/kg/hr, Last Rate: 0.11 mg/kg/hr (02/24/24 0614)  EPINEPHrine, 0.04 mcg/kg/min (Dosing Weight), Last Rate: 0.04 mcg/kg/min (02/23/24 1900)  lactated Ringer's, 5 mL/hr, Last Rate: 5 mL/hr (02/24/24 0700)  lactated Ringer's, 10 mL/hr, Last Rate: Stopped (02/20/24 1715)  PrismaSol 4/2.5, 2,400 mL/hr, Last Rate: 2,400 mL/hr (02/21/24 1653)  propofol, 5-50 mcg/kg/min (Dosing Weight), Last Rate: 45 mcg/kg/min (02/24/24 0355)      PRN medications  PRN medications: acetaminophen, calcium gluconate, calcium gluconate, dextrose, dextrose **OR** glucagon, diphenhydrAMINE, glucagon, HYDROmorphone, HYDROmorphone, insulin regular, lactulose, artificial tears, magnesium sulfate, magnesium sulfate, oxygen, potassium chloride CR **OR** potassium chloride, potassium chloride, promethazine    Results for orders placed or performed during the hospital encounter of 02/15/24 (from the past 24 hour(s))   Renal function panel   Result Value Ref Range    Glucose 170 (H) 74 - 99 mg/dL    Sodium 132 (L) 136 - 145 mmol/L    Potassium 4.5 3.5 - 5.3 mmol/L    Chloride 100 98 - 107 mmol/L    Bicarbonate 23 21 - 32 mmol/L    Anion Gap 14 10 - 20 mmol/L    Urea Nitrogen 35 (H) 6 - 23 mg/dL    Creatinine 1.73 (H) 0.50 - 1.05 mg/dL    eGFR 40 (L) >60 mL/min/1.73m*2    Calcium 7.9 (L) 8.6 - 10.6 mg/dL    Phosphorus 2.9 2.5 - 4.9 mg/dL    Albumin 3.0 (L) 3.4 - 5.0 g/dL   Blood Gas Arterial Full Panel   Result Value Ref Range    POCT pH, Arterial 7.51 (H) 7.38 - 7.42 pH    POCT pCO2, Arterial 29 (L) 38 - 42 mm Hg    POCT pO2, Arterial 174 (H) 85 - 95 mm Hg    POCT SO2, Arterial 100 94 - 100 %    POCT Oxy Hemoglobin, Arterial 97.1 94.0 - 98.0 %    POCT Hematocrit Calculated, Arterial 29.0 (L) 36.0 - 46.0  %    POCT Sodium, Arterial 129 (L) 136 - 145 mmol/L    POCT Potassium, Arterial 4.7 3.5 - 5.3 mmol/L    POCT Chloride, Arterial 102 98 - 107 mmol/L    POCT Ionized Calcium, Arterial 1.09 (L) 1.10 - 1.33 mmol/L    POCT Glucose, Arterial 178 (H) 74 - 99 mg/dL    POCT Lactate, Arterial 0.5 0.4 - 2.0 mmol/L    POCT Base Excess, Arterial 0.6 -2.0 - 3.0 mmol/L    POCT HCO3 Calculated, Arterial 23.1 22.0 - 26.0 mmol/L    POCT Hemoglobin, Arterial 9.7 (L) 12.0 - 16.0 g/dL    POCT Anion Gap, Arterial 9 (L) 10 - 25 mmo/L    Patient Temperature 37.0 degrees Celsius    FiO2 30 %   POCT GLUCOSE   Result Value Ref Range    POCT Glucose 171 (H) 74 - 99 mg/dL   Transthoracic Echo (TTE) Limited   Result Value Ref Range    LVIDd 4.40 cm   Renal function panel   Result Value Ref Range    Glucose 170 (H) 74 - 99 mg/dL    Sodium 134 (L) 136 - 145 mmol/L    Potassium 4.5 3.5 - 5.3 mmol/L    Chloride 100 98 - 107 mmol/L    Bicarbonate 25 21 - 32 mmol/L    Anion Gap 14 10 - 20 mmol/L    Urea Nitrogen 34 (H) 6 - 23 mg/dL    Creatinine 1.73 (H) 0.50 - 1.05 mg/dL    eGFR 40 (L) >60 mL/min/1.73m*2    Calcium 8.3 (L) 8.6 - 10.6 mg/dL    Phosphorus 2.5 2.5 - 4.9 mg/dL    Albumin 3.4 3.4 - 5.0 g/dL   Calcium, Ionized   Result Value Ref Range    POCT Calcium, Ionized 1.09 (L) 1.1 - 1.33 mmol/L   CBC   Result Value Ref Range    WBC 10.6 4.4 - 11.3 x10*3/uL    nRBC 1.6 (H) 0.0 - 0.0 /100 WBCs    RBC 2.90 (L) 4.00 - 5.20 x10*6/uL    Hemoglobin 7.9 (L) 12.0 - 16.0 g/dL    Hematocrit 23.5 (L) 36.0 - 46.0 %    MCV 81 80 - 100 fL    MCH 27.2 26.0 - 34.0 pg    MCHC 33.6 32.0 - 36.0 g/dL    RDW 15.9 (H) 11.5 - 14.5 %    Platelets 54 (L) 150 - 450 x10*3/uL   Magnesium   Result Value Ref Range    Magnesium 2.69 (H) 1.60 - 2.40 mg/dL   Blood Gas Arterial Full Panel   Result Value Ref Range    POCT pH, Arterial 7.38 7.38 - 7.42 pH    POCT pCO2, Arterial 40 38 - 42 mm Hg    POCT pO2, Arterial 295 (H) 85 - 95 mm Hg    POCT SO2, Arterial 100 94 - 100 %    POCT  Oxy Hemoglobin, Arterial 97.9 94.0 - 98.0 %    POCT Hematocrit Calculated, Arterial 39.0 36.0 - 46.0 %    POCT Sodium, Arterial 130 (L) 136 - 145 mmol/L    POCT Potassium, Arterial 4.5 3.5 - 5.3 mmol/L    POCT Chloride, Arterial 100 98 - 107 mmol/L    POCT Ionized Calcium, Arterial 1.08 (L) 1.10 - 1.33 mmol/L    POCT Glucose, Arterial 177 (H) 74 - 99 mg/dL    POCT Lactate, Arterial 0.7 0.4 - 2.0 mmol/L    POCT Base Excess, Arterial -1.3 -2.0 - 3.0 mmol/L    POCT HCO3 Calculated, Arterial 23.7 22.0 - 26.0 mmol/L    POCT Hemoglobin, Arterial 13.1 12.0 - 16.0 g/dL    POCT Anion Gap, Arterial 11 10 - 25 mmo/L    Patient Temperature 37.0 degrees Celsius    FiO2 30 %   POCT GLUCOSE   Result Value Ref Range    POCT Glucose 153 (H) 74 - 99 mg/dL   Renal function panel   Result Value Ref Range    Glucose 149 (H) 74 - 99 mg/dL    Sodium 134 (L) 136 - 145 mmol/L    Potassium 4.6 3.5 - 5.3 mmol/L    Chloride 101 98 - 107 mmol/L    Bicarbonate 22 21 - 32 mmol/L    Anion Gap 16 10 - 20 mmol/L    Urea Nitrogen 31 (H) 6 - 23 mg/dL    Creatinine 1.73 (H) 0.50 - 1.05 mg/dL    eGFR 40 (L) >60 mL/min/1.73m*2    Calcium 8.1 (L) 8.6 - 10.6 mg/dL    Phosphorus 2.8 2.5 - 4.9 mg/dL    Albumin 4.0 3.4 - 5.0 g/dL   Blood Gas Arterial Full Panel   Result Value Ref Range    POCT pH, Arterial 7.44 (H) 7.38 - 7.42 pH    POCT pCO2, Arterial 34 (L) 38 - 42 mm Hg    POCT pO2, Arterial 186 (H) 85 - 95 mm Hg    POCT SO2, Arterial 100 94 - 100 %    POCT Oxy Hemoglobin, Arterial 97.9 94.0 - 98.0 %    POCT Hematocrit Calculated, Arterial 23.0 (L) 36.0 - 46.0 %    POCT Sodium, Arterial 131 (L) 136 - 145 mmol/L    POCT Potassium, Arterial 4.9 3.5 - 5.3 mmol/L    POCT Chloride, Arterial 103 98 - 107 mmol/L    POCT Ionized Calcium, Arterial 1.06 (L) 1.10 - 1.33 mmol/L    POCT Glucose, Arterial 159 (H) 74 - 99 mg/dL    POCT Lactate, Arterial 0.4 0.4 - 2.0 mmol/L    POCT Base Excess, Arterial -0.8 -2.0 - 3.0 mmol/L    POCT HCO3 Calculated, Arterial 23.1 22.0 -  26.0 mmol/L    POCT Hemoglobin, Arterial 7.7 (L) 12.0 - 16.0 g/dL    POCT Anion Gap, Arterial 10 10 - 25 mmo/L    Patient Temperature 37.0 degrees Celsius    FiO2 30 %   POCT GLUCOSE   Result Value Ref Range    POCT Glucose 141 (H) 74 - 99 mg/dL   POCT GLUCOSE   Result Value Ref Range    POCT Glucose 148 (H) 74 - 99 mg/dL   Blood Gas Arterial Full Panel   Result Value Ref Range    POCT pH, Arterial 7.46 (H) 7.38 - 7.42 pH    POCT pCO2, Arterial 34 (L) 38 - 42 mm Hg    POCT pO2, Arterial 169 (H) 85 - 95 mm Hg    POCT SO2, Arterial 100 94 - 100 %    POCT Oxy Hemoglobin, Arterial 96.9 94.0 - 98.0 %    POCT Hematocrit Calculated, Arterial 22.0 (L) 36.0 - 46.0 %    POCT Sodium, Arterial 132 (L) 136 - 145 mmol/L    POCT Potassium, Arterial 5.0 3.5 - 5.3 mmol/L    POCT Chloride, Arterial 102 98 - 107 mmol/L    POCT Ionized Calcium, Arterial 1.07 (L) 1.10 - 1.33 mmol/L    POCT Glucose, Arterial 225 (H) 74 - 99 mg/dL    POCT Lactate, Arterial 0.5 0.4 - 2.0 mmol/L    POCT Base Excess, Arterial 0.4 -2.0 - 3.0 mmol/L    POCT HCO3 Calculated, Arterial 24.2 22.0 - 26.0 mmol/L    POCT Hemoglobin, Arterial 7.2 (L) 12.0 - 16.0 g/dL    POCT Anion Gap, Arterial 11 10 - 25 mmo/L    Patient Temperature 37.0 degrees Celsius    FiO2 30 %   POCT GLUCOSE   Result Value Ref Range    POCT Glucose 228 (H) 74 - 99 mg/dL   Renal function panel   Result Value Ref Range    Glucose 173 (H) 74 - 99 mg/dL    Sodium 135 (L) 136 - 145 mmol/L    Potassium 4.5 3.5 - 5.3 mmol/L    Chloride 100 98 - 107 mmol/L    Bicarbonate 25 21 - 32 mmol/L    Anion Gap 15 10 - 20 mmol/L    Urea Nitrogen 36 (H) 6 - 23 mg/dL    Creatinine 1.78 (H) 0.50 - 1.05 mg/dL    eGFR 39 (L) >60 mL/min/1.73m*2    Calcium 8.4 (L) 8.6 - 10.6 mg/dL    Phosphorus 1.9 (L) 2.5 - 4.9 mg/dL    Albumin 4.1 3.4 - 5.0 g/dL   Lactate Dehydrogenase   Result Value Ref Range     (H) 84 - 246 U/L   Hepatic function panel   Result Value Ref Range    Albumin 4.1 3.4 - 5.0 g/dL    Bilirubin,  Total 0.6 0.0 - 1.2 mg/dL    Bilirubin, Direct 0.2 0.0 - 0.3 mg/dL    Alkaline Phosphatase 59 33 - 110 U/L     (H) 7 - 45 U/L     (H) 9 - 39 U/L    Total Protein 6.1 (L) 6.4 - 8.2 g/dL   aPTT every AM when therapeutic for 2 consecutive   Result Value Ref Range    aPTT 49 (H) 27 - 38 seconds   Calcium, Ionized   Result Value Ref Range    POCT Calcium, Ionized 1.09 (L) 1.1 - 1.33 mmol/L   CBC   Result Value Ref Range    WBC 7.7 4.4 - 11.3 x10*3/uL    nRBC 1.8 (H) 0.0 - 0.0 /100 WBCs    RBC 2.28 (L) 4.00 - 5.20 x10*6/uL    Hemoglobin 6.2 (LL) 12.0 - 16.0 g/dL    Hematocrit 18.7 (L) 36.0 - 46.0 %    MCV 82 80 - 100 fL    MCH 27.2 26.0 - 34.0 pg    MCHC 33.2 32.0 - 36.0 g/dL    RDW 15.9 (H) 11.5 - 14.5 %    Platelets 51 (L) 150 - 450 x10*3/uL   Magnesium   Result Value Ref Range    Magnesium 2.79 (H) 1.60 - 2.40 mg/dL   Blood Gas Arterial Full Panel   Result Value Ref Range    POCT pH, Arterial 7.44 (H) 7.38 - 7.42 pH    POCT pCO2, Arterial 38 38 - 42 mm Hg    POCT pO2, Arterial 165 (H) 85 - 95 mm Hg    POCT SO2, Arterial 100 94 - 100 %    POCT Oxy Hemoglobin, Arterial 97.1 94.0 - 98.0 %    POCT Hematocrit Calculated, Arterial 19.0 (L) 36.0 - 46.0 %    POCT Sodium, Arterial 134 (L) 136 - 145 mmol/L    POCT Potassium, Arterial 4.6 3.5 - 5.3 mmol/L    POCT Chloride, Arterial 103 98 - 107 mmol/L    POCT Ionized Calcium, Arterial 1.08 (L) 1.10 - 1.33 mmol/L    POCT Glucose, Arterial 176 (H) 74 - 99 mg/dL    POCT Lactate, Arterial 0.5 0.4 - 2.0 mmol/L    POCT Base Excess, Arterial 1.5 -2.0 - 3.0 mmol/L    POCT HCO3 Calculated, Arterial 25.8 22.0 - 26.0 mmol/L    POCT Hemoglobin, Arterial 6.3 (LL) 12.0 - 16.0 g/dL    POCT Anion Gap, Arterial 10 10 - 25 mmo/L    Patient Temperature 37.0 degrees Celsius    FiO2 30 %   Prepare RBC: 1 Units   Result Value Ref Range    PRODUCT CODE C8656N13     Unit Number [id number]     Unit ABO O     Unit RH POS     XM INTEP COMP     Dispense Status TR     Blood Expiration Date  March 10, 2024 23:59 EDT     PRODUCT BLOOD TYPE 5100     UNIT VOLUME 275    Prepare RBC: 1 Units   Result Value Ref Range    PRODUCT CODE W5102R83     Unit Number [id number]     Unit ABO O     Unit RH POS     XM INTEP COMP     Dispense Status IS     Blood Expiration Date March 28, 2024 23:59 EDT     PRODUCT BLOOD TYPE 5100     UNIT VOLUME 350    aPTT 2 hours after any Bivalrudin dosage change   Result Value Ref Range    aPTT 55 (H) 27 - 38 seconds   POCT GLUCOSE   Result Value Ref Range    POCT Glucose 173 (H) 74 - 99 mg/dL     *Note: Due to a large number of results and/or encounters for the requested time period, some results have not been displayed. A complete set of results can be found in Results Review.         Assessment/Plan   Assessment: Ms. 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    Originally presented to [facility] ED on 2/15/24 with complaints of N/V x 3 days and inability to keep medications down. POCUS revealed acute systolic heart failure w/ severe BIV dysfunction when compared to previous images in 11/2023. Also noted to have HIRAM and transaminitis. Immunosuppression notably below therapeutic range. Admitted to HFICU and treated for suspected acute cellular vs. acute antibody mediated rejection; however, cardiac biopsy negative for signs of rejection. Despite rejection therapy, inotropes and MCS via IABP, patient's end organ function continued to worsen without improvement in cardiac function. Ultimately placed on left femoral VA ECMO w/ Dr. Marina on 2/19/2024 and transferred to CTICU.     CTICU course complicated by anemia requiring blood product transfusions, volume  overload & intubation (tachypnea and increased work of breathing 2/2 pulmonary edema).     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  #Acute renal failure 2/2 to cardiorenal syndrome - requiring CVVH  #Acute transaminitis     Donor/Recipient Infectious history:  CMV: -/+ (last collected 2/16/24)  Toxo: -/-   Hep C: -/-    Rejection/Prophylaxis (transplants):  - Steroids: Prednisone taper started 2/18/24 following 3xmethylprednisone pulse: Decrease daily, PO prednisone 60mg, 50mg, 40mg, 30mg, 20mg and then continue on 10mg daily   - Tacrolimus: 2.0mg PO @ 0630 & 2.0mg PO @ 1830 w/ daily levels drawn @ 0600  - Serolimus: 1.0mg PO daily w/ daily levels drawn at 0600  - Tacrolimus goal troughs: 3-5  - Serolimus goal troughs: 7-9  - Combined sirolimus/tacrolimus goal of 10-12  - Antifungals: nystatin 500,000units Q6  - Antivirals: valcyte 450mg Q48hrs --> On hold (2/23)  - Anti PCP & Toxoplasmosis: Bactrim SS daily  --> On hold (2/23)    Last cardiac biopsy: 2/16/24 with ACR1 and no AMR  Last HLA: 2/16/24 negative for DSAs   Last RHC: 2/16/24 w/ RA 11, PAP 34/14(23), W 19 and C.I. 2.3  Last LHC: 2/18/24 negative for CAV and CAD   Last TTE/BOB: 2/20/24 continue to show severe BIV dysfunction   Osteopenia/osteoporosis prophylaxis: Vitamin D3 and calcium supplements  Peptic/gastric ulcer prophylaxis: Pantoprazole 40mg daily   CAV Prophylaxis: Hold Aspirin 81mg daily while on systemic heparin. Okay to resume pravastatin      - Unclear cause of acute severe graft dysfunction. Differentials include: acute ACR vs. AMR vs. viral myocarditis vs. acute lupus myocarditis vs. Vasculitis; however, 2xallograft biopsies on 2/16 & 2/20 remain negative for any significant pathology. Notably, both biopsies taken after rejection therapies implemented which may have reduced areas of graft damage.    - DSAs remain negative;  however, patient may have non-HLA antibodies present. 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   - Negative CAV & CAD via LHC on 2/18/24   - Remains on MCS via right femoral IABP set 1:1 and augmenting appropriately & left femoral VA ECMO w/ appropriate flows ~3.3L/FIO2 100%.  - Remains intubated due to pulmonary edema   - Remains on CVVH due to ARF   - LFTs gradually trending down and lactate normalized.    - Completed methylpred steroid pulse w/ 1g Q24 x 3 days (2/16-2/18)  - Thymoglobulin doses: 2/18 & 2/19   - IVIG + PLEX Session: 2/18 & 2/20   - No further thymo, PLEX or IVIG treatment at this time with multiple biopsies negative for ACR & AMR   - Patient does have improved pulsatility with epinephrine infusion in place. Chest x-ray also shows improved lung fields with volume removal. Lastly, patient now producing UOP and displaying signs of renal recovery.    - ECMO turndown trial on 2/23 showed unchanged LVEF ~10%     Recommendations:  - Continue hemodynamic support with VA-ECMO, IABP 1:1, vasopressors (epi 0.04).  - Plan is for SAT/SBT today with possible extubation  - Continue Prednisone 10mg daily   - Continue Tacrolimus 2.5 mg BID - tacrolimus trough level 3.2 today (goal 3-5)  - Increase Sirolimus dose of 1mg daily to sirolimus 1.5mg daily, give extra dose of 0.5mg sirolimus today - sirolimus trough level 3.0 today (goal 7-9)  - Thrombocytopenia likely multifactorial from recent thymoglobulin, MCS & CVVH. Continue to hold aspirin and agree with PF4 to rule out HIT, continues on bivalirudin at this time.  - Anticipate repeating echocardiogram on Monday  - Continue CVVH with target net -1.0 liters.  - Hold Valcyte and Bactrim for now.   - Follow-up CMV PCR   - At this time due to the patient's critical illness she is not a candidate for single or dual organ transplantation. Will continue to reassess as her course progresses, ideally she will need to show respiratory stability while extubated/off  mechanical ventilation, adequate renal recovery, and ability to rehabilitate prior to major cardiac surgery.    Patient remains critically ill with multisystem organ failure requiring VA ECMO, IABP, CVVH and intubation. Appreciate continued CTICU care/management.      Patient was examined and discussed with [facility] Failure [facility] Transplant Cardiology attending physician, Dr. Lexie Wright.    Signed,  Gwendolyn Del Valle MD  Heart Failure Fellow PGY4

## 2024-02-24 NOTE — PROGRESS NOTES
CTICU Progress Note  Charla Bowles/[id number]    Admit Date: 2/15/2024  Hospital Length of Stay: 9   ICU Length of Stay: 4d 15h   CT SURGEON:     SUBJECTIVE: Hgb 6.2 overnight without other s/s bleeding, received 2U PRBC. Extubated this AM.    MEDICATIONS  Infusions:  bivalirudin, Last Rate: 0.11 mg/kg/hr (02/24/24 0614)  EPINEPHrine, Last Rate: 0.04 mcg/kg/min (02/23/24 1900)  lactated Ringer's, Last Rate: 5 mL/hr (02/24/24 0700)  lactated Ringer's, Last Rate: Stopped (02/20/24 1715)  PrismaSol 4/2.5, Last Rate: 2,400 mL/hr (02/21/24 1653)  propofol, Last Rate: 45 mcg/kg/min (02/24/24 0355)      Scheduled:  albumin human, 25 g, q6h  [Held by provider] aspirin, 81 mg, Daily  calcitriol, 0.5 mcg, Daily  esomeprazole, 40 mg, Daily before breakfast  ferrous sulfate, 60 mg of iron, Daily  insulin lispro, 0-15 Units, q6h  levothyroxine, 200 mcg, Daily  multivitamin with iron-minerals, 15 mL, Daily  nystatin, 5 mL, q6h  perflutren lipid microspheres, 0.5-10 mL of dilution, Once in imaging  perflutren protein A microsphere, 0.5 mL, Once in imaging  [Held by provider] polyethylene glycol, 17 g, BID  predniSONE, 10 mg, Daily  [Held by provider] rosuvastatin, 40 mg, Nightly  sirolimus, 1 mg, Daily  sodium phosphate, 21 mmol, Once  [Held by provider] sulfamethoxazole-trimethoprim, 80 mg of trimethoprim, Daily  sulfur hexafluoride microsphr, 2 mL, Once in imaging  sulfur hexafluoride microsphr, 2 mL, Once in imaging  tacrolimus, 2.5 mg, q12h TRICIA  [Held by provider] valGANciclovir, 450 mg, q48h      PRN:  acetaminophen, 650 mg, q6h PRN  calcium gluconate, 1 g, q6h PRN  calcium gluconate, 2 g, q6h PRN  dextrose, 25 g, q15 min PRN  dextrose, 25 g, q15 min PRN   Or  glucagon, 1 mg, q15 min PRN  diphenhydrAMINE, 25 mg, q6h PRN  glucagon, 1 mg, q15 min PRN  HYDROmorphone, 0.4 mg, q3h PRN  HYDROmorphone, 1 mg, q4h PRN  insulin regular, 0-15 Units, PRN  lactulose, 20 g, Daily PRN  artificial tears, 2 drop, PRN  magnesium sulfate,  "1 g, q6h PRN  magnesium sulfate, 2 g, q6h PRN  oxygen, , Continuous PRN - O2/gases  potassium chloride CR, 20 mEq, q6h PRN   Or  potassium chloride, 20 mEq, q6h PRN  potassium chloride, 40 mEq, q6h PRN  promethazine, 12.5 mg, q6h PRN        PHYSICAL EXAM:   Visit Vitals  /69   Pulse (!) 119   Temp 36.7 °C (98.1 °F) (Core)   Resp 16   Ht 1.549 m (5' 0.98\")   Wt 91.7 kg (202 lb 2.6 oz)   SpO2 98%   BMI 38.22 kg/m²   OB Status Hysterectomy   Smoking Status Never   BSA 1.99 m²     Wt Readings from Last 5 Encounters:   02/23/24 91.7 kg (202 lb 2.6 oz)   12/07/23 92.1 kg (203 lb)   12/01/23 93 kg (205 lb)   11/29/23 92.9 kg (204 lb 12.8 oz)   11/09/23 91.3 kg (201 lb 3.2 oz)     INTAKE/OUTPUT:  I/O last 3 completed shifts:  In: 3672.4 (40 mL/kg) [P.O.:25; I.V.:2007.4 (21.9 mL/kg); Blood:475; NG/GT:1015; IV Piggyback:150]  Out: 9545 (104.1 mL/kg) [Urine:535 (0.2 mL/kg/hr); Other:8010; Stool:1000]  Weight: 91.7 kg          Vent settings:  Vent Mode: Volume control/assist control  FiO2 (%):  [30 %] 30 %  S RR:  [12] 12  S VT:  [380 mL] 380 mL  PEEP/CPAP (cm H2O):  [5 cm H20-8 cm H20] 8 cm H20  SD SUP:  [5 cm H20-8 cm H20] 8 cm H20  MAP (cm H2O):  [8-11] 11    Physical Exam:   - CONSTITUTION: critically ill, young appearing female on ECMO and IABP  - NEUROLOGIC: Alert and oriented, CAM negative. Moving all extremities with no focal deficits  - CARDIOVASCULAR: ST. On VA ECMO left femoral vein and artery with distal perfusion catheter. Right femoral IABP 1:1 with appropriate augmentation  - RESPIRATORY: 2L NC  - GI: Singh with clear, hetal urine.  - : obese, soft.  Active BS. FMS in place with liquid stool.  - EXTREMITIES: warm, non-pitting edema. Well perfused.  Right fem IABP, Left fem VA ECMO, small hematoma, warm extremity down to ankle, left foot cool, dopplerable popliteal, distal perfusion to left foot seen under ultrasound  - SKIN: Intact  - PSYCHIATRIC: Calm, appropriate    Daily Risk Screen  Extubated  Central " line: required- HD, hemodynamic monitoring, parenteral medications  Singh: required, accurate I/O in critically ill    Images:  Morning CXR reviewed.    Invasive Hemodynamics:    Most Recent Range Past 24hrs   BP (Art) 126/64 Arterial Line BP 1  Min: 102/56  Max: 139/83   MAP(Art) 92 mmHg Arterial Line MAP 1 (mmHg)   Min: 72 mmHg  Max: 102 mmHg   RA/CVP   No data recorded   PA 36/28 PAP  Min: 13/-9  Max: 36/28   PA(mean) 31 mmHg PAP (Mean)  Min: 3 mmHg  Max: 31 mmHg   CO 5.7 L/min No data recorded   CI 2.9 L/min/m2 No data recorded   Mixed Venous 65 % SVO2 (%)  Min: 65 %  Max: 93 %   SVR  758 (dyne*sec)/cm5 No data recorded     ECMO:     Most Recent Range Past 24hrs   Flow 3.07 Patient Flow (L/min)  Min: 3.02  Max: 3.7   Speed 3000 Circuit Flow (RPM)  Min: 3000  Max: 3300   Sweep 1 Sweep Gas Flow Rate (L/min)  Min: 1  Max: 1        Assessment/Plan   33 year old female with past medical history of heart transplant in March 2022 with postoperative course complicated by upper extremity/internal jugular DVTs, and 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 She was admitted to HFICU on 2/15 with concern for cardiogenic shock secondary to allograft rejection and decompensated heart failure with multiorgan dysfunction including significant elevation of liver enzymes and nonoliguric acute kidney injury. Endomyocardial biopsy on 2/16 revealed mild ACR with +CD4s and negative HLAs, however multisystem organ failure persisted with increased inotropic requirements. IABP placed on 2/18. Transferred to CTICU on 2/19 for VA ECMO cannulation with Dr. Marina for persistent multi-organ system dysfunction. Intubated 2/21 for respiratory failure 2/2 pulmonary edema, extubated 2/24.     Plan:  NEURO: No history. Neuro intact and CAM negative. Previously did not tolerate precedex 2/2 bradycardia. -->  - Serial neuro and pain assessments  - PRN Tylenol 650mg q6hr (mild)  - continue PO dilaudid 1mg for moderate pain and IV dilaudid 0.4mg for  severe pain  - PT/OT Consult  - CAM ICU score qshift  - Sleep/wake cycle hygiene     CV: Patient has a history of heart transplant in March of 2022 with TTE on 11/29 with LVEF 60-65%. Admitted for cardiogenic shock with concern for acute cellular rejection s/p IABP placement (R fem) 2/18 and VA ECMO (left fem) 2/19. ECHO 2/20 with persisting severe BiV dysfunction despite negative biopsy 2/20.  Echo with turn-down 2/22 EF 10%, severely reduced RV, mild AR and mild-moderate MR. Remains on ECMO 3.1L flow/100%FiO2 /sweep 1.  IABP 1:1. On epinephrine 0.04 mcg/kg/min. End organ perfusion stable. -130. Little change in hemodynamics with turn-down to 1.5L this AM. -->  - Maintain goal MAP 70-90  - Maintain IABP 1:1  - continue full ECMO support while optimizing volume status  - Continue epi 0.04mcg/kg/min  - Repeat TTE Monday with ECMO turn down  - Daily ECMO gases, LDH   - Hold daily rosuvastatin 40mg, will resume if LFT normalize  - Hold home amlodipine  - Possibility of transition to Impella 5.5 under discussion with cardiac surgery team    VASC: Vascular surgery following for diminished pulses in LLE.  CK WNL, LLE warm down to ankle.  Dopplerable popliteal pulses, DP flow seen by ultrasound. Formal arterial duplex 2/22 with slow flow.   - Continue to trend CK daily  - Keep LLE warm  - Vascular surgery following  - Continue bival gtt     PULM: No history of pulmonary disease. Acute respiratory failure due to tachypnea and acute pulmonary edema on CXR with frothy secretion.  Intubated 2/21 due to respiratory distress 2/2 pulmonary edema.  Appropriate plateau pressures. Currently on CPAP 30% FiO2, 8 PEEP, 10 PS. CXR with improving pulmonary edema.  Of note, increased WOB and RR correlates with loss of PA pulsatility and RV ejection.-->  - Daily CXR  - Maintain SPO2 > 92%  - Adjust sweep for normal pH  - SBT today, will assess for extubation  - See CV for RV support, PA pulsatility plan     GI: History of gastric  bypass and MMF colitis.  Dobhoff in place, TF on hold for potential extubation.  FMS in place, 100mL out overnight.-->  - Continue daily PPI   - Continue calcitriol 0.5mg daily  - Continue multivitamin  - Continue Calcium carbonate 1,250mg BID  - Hold TF, prostat in anticipation of extubation trial  - Bowel regimen on hold d/t liquid stools  - Start fiber BID     : No history of renal disease, baseline creatinine 1.2-1.3. Oliguric HIRAM likely in setting of cardiorenal physiology. Renal US from 2/19 unremarkable. Hyponatremia resolved. On CVVH  Singh in place and making minimal urine.  Negative 4.L overnight.-->  - continue CVVH for goal negative 1L  - 24 hours scheduled concentrated albumin  - RFP BID  - Replete electrolytes per CTICU protocol  - Nephrology following, appreciate recs     ENDO: History of hypothyroidism TSH 12.02, free thyroxine 2.19. A1c: 6.3. -->  - Maintain BG <180 with hypoglycemia protocol  - Resume regular SSI #3 q6hs  - Continue synthroid 200mcg daily  - Endocrine consulted, appreciate recs     HEME: History of remote DVT. Acute on chronic iron deficiency anemia. Acute blood loss anemia, hgb 6.2 overnight, received 2 U PRBC. Bleeding from ECMO cannulas resolved.  Stable thrombocytopenia. PF4 2/21 pending. -->    - CBC BID  - Continue bival gtt  - hold ASA with thrombocytopenia  - Continue daily ferrous sulfate  - SCDs for DVT prophylaxis  - Last type and screen: 2/22     Rejection/prophylaxis: S/p heart transplant 3/31/2022. Induction basiliximab. Donor HCV -, toxo -/-, CMV -/+. Mild ACR with +CD4 and negative HLAs however clinical presentation concern for AMR rejection.  PLEX/IVIG 2/17, 2/20. Thymo 2/18, 2/19. Repeat biopsy 2/20 with no evidence of on going rejection (ACR 0R, pAMR0 and no C3D or C4D)  - Completed steroid taper, continue prednisone 10mg daily  - Continue tacrolimus 2.5 mg BID with goal FK level 3-5  - Increase sirolimus to 1.5 mg daily, with one time 0.5 mg now with goal  level 7-9  - Plan for no further PLEX/IVIG or thymo  - continue Nystatin suspension 500,000 units q6hr  - Hold bactrim and valcyte in setting of thrombocytopenia     ID: Afebrile, no current indications of infection. Received Zosyn and Vancomycin on 2/15 in ED. Blood cultures from 2/15 negative. -->  - Trend temp q4h     Skin: No active skin issues.  - Preventative Mepilex dressings in place on sacrum and heels  - Change preventative Mepilex weekly or more frequently as indicated (when moist/soiled)   - Every shift skin assessment per nursing and weekly ICU skin rounds  - Moisture barrier to be applied with christopher care  - Active skin problems addressed with nursing on daily rounds     Proph:  SCDs  PPI  Bivalirudin gtt     G: Line  Right radial arterial line placed 2/16  RIJ introducer with PAC placed 2/16  LIF Trialysis placed 2/17  Right femoral IABP placed 2/18  8F Left femoral reperfusion cannula placed 2/19  Left femoral 17F arterial ECMO cannula placed 2/19  Left femoral 25F venous ECMO cannula placed 2/19     F: Family: Mother and aunt updated at bedside    Restraints: Not indicated    Code status: Full Code     I personally spent 60 minutes of critical care time directly and personally managing the patient exclusive of separately billable procedures.     A,B,C,D,E,F,G: reviewed     Discussed with Dr. Mehta, Dr. Wright  Dispo: CTICU care for now.    CTICU TEAM PHONE [phone]    JIMMY Mack-CNP
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
## 2024-02-24 NOTE — PROGRESS NOTES
"Charla Bowles is a 33 y.o. female on day 9 of admission presenting with Cardiogenic shock (CMS/HCC).    Briefly, the patient is a 33 year old female with past medical history of heart transplant in March 2022 admitted 2/15 with biopsy on 2/16 showing mild ACR.  Sequential escalation of support to IABP (2/18) and [facility]-ECMO (2/19) and concurrent treatment for rejection. Intubated on 2/21 due to pulmonary edema.        Objective     Physical Exam    Last Recorded Vitals  Blood pressure 134/69, pulse 105, temperature 36.7 °C (98.1 °F), temperature source Core, resp. rate 18, height 1.549 m (5' 0.98\"), weight 91.7 kg (202 lb 2.6 oz), SpO2 99 %.  Intake/Output last 3 Shifts:  I/O last 3 completed shifts:  In: 3672.4 (40 mL/kg) [P.O.:25; I.V.:2007.4 (21.9 mL/kg); Blood:475; NG/GT:1015; IV Piggyback:150]  Out: 9545 (104.1 mL/kg) [Urine:535 (0.2 mL/kg/hr); Other:8010; Stool:1000]  Weight: 91.7 kg     Relevant Results  Results for orders placed or performed during the hospital encounter of 02/15/24 (from the past 24 hour(s))   POCT GLUCOSE   Result Value Ref Range    POCT Glucose 153 (H) 74 - 99 mg/dL   Renal function panel   Result Value Ref Range    Glucose 149 (H) 74 - 99 mg/dL    Sodium 134 (L) 136 - 145 mmol/L    Potassium 4.6 3.5 - 5.3 mmol/L    Chloride 101 98 - 107 mmol/L    Bicarbonate 22 21 - 32 mmol/L    Anion Gap 16 10 - 20 mmol/L    Urea Nitrogen 31 (H) 6 - 23 mg/dL    Creatinine 1.73 (H) 0.50 - 1.05 mg/dL    eGFR 40 (L) >60 mL/min/1.73m*2    Calcium 8.1 (L) 8.6 - 10.6 mg/dL    Phosphorus 2.8 2.5 - 4.9 mg/dL    Albumin 4.0 3.4 - 5.0 g/dL   Blood Gas Arterial Full Panel   Result Value Ref Range    POCT pH, Arterial 7.44 (H) 7.38 - 7.42 pH    POCT pCO2, Arterial 34 (L) 38 - 42 mm Hg    POCT pO2, Arterial 186 (H) 85 - 95 mm Hg    POCT SO2, Arterial 100 94 - 100 %    POCT Oxy Hemoglobin, Arterial 97.9 94.0 - 98.0 %    POCT Hematocrit Calculated, Arterial 23.0 (L) 36.0 - 46.0 %    POCT Sodium, Arterial 131 (L) " 136 - 145 mmol/L    POCT Potassium, Arterial 4.9 3.5 - 5.3 mmol/L    POCT Chloride, Arterial 103 98 - 107 mmol/L    POCT Ionized Calcium, Arterial 1.06 (L) 1.10 - 1.33 mmol/L    POCT Glucose, Arterial 159 (H) 74 - 99 mg/dL    POCT Lactate, Arterial 0.4 0.4 - 2.0 mmol/L    POCT Base Excess, Arterial -0.8 -2.0 - 3.0 mmol/L    POCT HCO3 Calculated, Arterial 23.1 22.0 - 26.0 mmol/L    POCT Hemoglobin, Arterial 7.7 (L) 12.0 - 16.0 g/dL    POCT Anion Gap, Arterial 10 10 - 25 mmo/L    Patient Temperature 37.0 degrees Celsius    FiO2 30 %   POCT GLUCOSE   Result Value Ref Range    POCT Glucose 141 (H) 74 - 99 mg/dL   POCT GLUCOSE   Result Value Ref Range    POCT Glucose 148 (H) 74 - 99 mg/dL   Blood Gas Arterial Full Panel   Result Value Ref Range    POCT pH, Arterial 7.46 (H) 7.38 - 7.42 pH    POCT pCO2, Arterial 34 (L) 38 - 42 mm Hg    POCT pO2, Arterial 169 (H) 85 - 95 mm Hg    POCT SO2, Arterial 100 94 - 100 %    POCT Oxy Hemoglobin, Arterial 96.9 94.0 - 98.0 %    POCT Hematocrit Calculated, Arterial 22.0 (L) 36.0 - 46.0 %    POCT Sodium, Arterial 132 (L) 136 - 145 mmol/L    POCT Potassium, Arterial 5.0 3.5 - 5.3 mmol/L    POCT Chloride, Arterial 102 98 - 107 mmol/L    POCT Ionized Calcium, Arterial 1.07 (L) 1.10 - 1.33 mmol/L    POCT Glucose, Arterial 225 (H) 74 - 99 mg/dL    POCT Lactate, Arterial 0.5 0.4 - 2.0 mmol/L    POCT Base Excess, Arterial 0.4 -2.0 - 3.0 mmol/L    POCT HCO3 Calculated, Arterial 24.2 22.0 - 26.0 mmol/L    POCT Hemoglobin, Arterial 7.2 (L) 12.0 - 16.0 g/dL    POCT Anion Gap, Arterial 11 10 - 25 mmo/L    Patient Temperature 37.0 degrees Celsius    FiO2 30 %   POCT GLUCOSE   Result Value Ref Range    POCT Glucose 228 (H) 74 - 99 mg/dL   Renal function panel   Result Value Ref Range    Glucose 173 (H) 74 - 99 mg/dL    Sodium 135 (L) 136 - 145 mmol/L    Potassium 4.5 3.5 - 5.3 mmol/L    Chloride 100 98 - 107 mmol/L    Bicarbonate 25 21 - 32 mmol/L    Anion Gap 15 10 - 20 mmol/L    Urea Nitrogen 36  (H) 6 - 23 mg/dL    Creatinine 1.78 (H) 0.50 - 1.05 mg/dL    eGFR 39 (L) >60 mL/min/1.73m*2    Calcium 8.4 (L) 8.6 - 10.6 mg/dL    Phosphorus 1.9 (L) 2.5 - 4.9 mg/dL    Albumin 4.1 3.4 - 5.0 g/dL   Lactate Dehydrogenase   Result Value Ref Range     (H) 84 - 246 U/L   Hepatic function panel   Result Value Ref Range    Albumin 4.1 3.4 - 5.0 g/dL    Bilirubin, Total 0.6 0.0 - 1.2 mg/dL    Bilirubin, Direct 0.2 0.0 - 0.3 mg/dL    Alkaline Phosphatase 59 33 - 110 U/L     (H) 7 - 45 U/L     (H) 9 - 39 U/L    Total Protein 6.1 (L) 6.4 - 8.2 g/dL   aPTT every AM when therapeutic for 2 consecutive   Result Value Ref Range    aPTT 49 (H) 27 - 38 seconds   Calcium, Ionized   Result Value Ref Range    POCT Calcium, Ionized 1.09 (L) 1.1 - 1.33 mmol/L   CBC   Result Value Ref Range    WBC 7.7 4.4 - 11.3 x10*3/uL    nRBC 1.8 (H) 0.0 - 0.0 /100 WBCs    RBC 2.28 (L) 4.00 - 5.20 x10*6/uL    Hemoglobin 6.2 (LL) 12.0 - 16.0 g/dL    Hematocrit 18.7 (L) 36.0 - 46.0 %    MCV 82 80 - 100 fL    MCH 27.2 26.0 - 34.0 pg    MCHC 33.2 32.0 - 36.0 g/dL    RDW 15.9 (H) 11.5 - 14.5 %    Platelets 51 (L) 150 - 450 x10*3/uL   Magnesium   Result Value Ref Range    Magnesium 2.79 (H) 1.60 - 2.40 mg/dL   Blood Gas Arterial Full Panel   Result Value Ref Range    POCT pH, Arterial 7.44 (H) 7.38 - 7.42 pH    POCT pCO2, Arterial 38 38 - 42 mm Hg    POCT pO2, Arterial 165 (H) 85 - 95 mm Hg    POCT SO2, Arterial 100 94 - 100 %    POCT Oxy Hemoglobin, Arterial 97.1 94.0 - 98.0 %    POCT Hematocrit Calculated, Arterial 19.0 (L) 36.0 - 46.0 %    POCT Sodium, Arterial 134 (L) 136 - 145 mmol/L    POCT Potassium, Arterial 4.6 3.5 - 5.3 mmol/L    POCT Chloride, Arterial 103 98 - 107 mmol/L    POCT Ionized Calcium, Arterial 1.08 (L) 1.10 - 1.33 mmol/L    POCT Glucose, Arterial 176 (H) 74 - 99 mg/dL    POCT Lactate, Arterial 0.5 0.4 - 2.0 mmol/L    POCT Base Excess, Arterial 1.5 -2.0 - 3.0 mmol/L    POCT HCO3 Calculated, Arterial 25.8 22.0 - 26.0  mmol/L    POCT Hemoglobin, Arterial 6.3 (LL) 12.0 - 16.0 g/dL    POCT Anion Gap, Arterial 10 10 - 25 mmo/L    Patient Temperature 37.0 degrees Celsius    FiO2 30 %   Prepare RBC: 1 Units   Result Value Ref Range    PRODUCT CODE S8000T94     Unit Number [id number]     Unit ABO O     Unit RH POS     XM INTEP COMP     Dispense Status TR     Blood Expiration Date March 10, 2024 23:59 EDT     PRODUCT BLOOD TYPE 5100     UNIT VOLUME 275    Prepare RBC: 1 Units   Result Value Ref Range    PRODUCT CODE [id number]     Unit Number [id number]     Unit ABO O     Unit RH POS     XM INTEP COMP     Dispense Status TR     Blood Expiration Date March 28, 2024 23:59 EDT     PRODUCT BLOOD TYPE 5100     UNIT VOLUME 350    Tacrolimus level   Result Value Ref Range    Tacrolimus  3.2 <=15.0 ng/mL   Sirolimus level   Result Value Ref Range    Sirolimus  3.0 (L) 4.0 - 20.0 ng/mL   aPTT 2 hours after any Bivalrudin dosage change   Result Value Ref Range    aPTT 55 (H) 27 - 38 seconds   CBC   Result Value Ref Range    WBC 7.6 4.4 - 11.3 x10*3/uL    nRBC 1.2 (H) 0.0 - 0.0 /100 WBCs    RBC 2.28 (L) 4.00 - 5.20 x10*6/uL    Hemoglobin 6.6 (L) 12.0 - 16.0 g/dL    Hematocrit 18.4 (L) 36.0 - 46.0 %    MCV 81 80 - 100 fL    MCH 28.9 26.0 - 34.0 pg    MCHC 35.9 32.0 - 36.0 g/dL    RDW 15.6 (H) 11.5 - 14.5 %    Platelets 54 (L) 150 - 450 x10*3/uL   POCT GLUCOSE   Result Value Ref Range    POCT Glucose 173 (H) 74 - 99 mg/dL   aPTT 2 hours after a single therapeutic aPTT   Result Value Ref Range    aPTT 58 (H) 27 - 38 seconds   Blood Gas Arterial Full Panel   Result Value Ref Range    POCT pH, Arterial 7.39 7.38 - 7.42 pH    POCT pCO2, Arterial 38 38 - 42 mm Hg    POCT pO2, Arterial 175 (H) 85 - 95 mm Hg    POCT SO2, Arterial 100 94 - 100 %    POCT Oxy Hemoglobin, Arterial 97.2 94.0 - 98.0 %    POCT Hematocrit Calculated, Arterial 23.0 (L) 36.0 - 46.0 %    POCT Sodium, Arterial 137 136 - 145 mmol/L    POCT Potassium, Arterial 4.2 3.5 - 5.3  mmol/L    POCT Chloride, Arterial 105 98 - 107 mmol/L    POCT Ionized Calcium, Arterial 1.06 (L) 1.10 - 1.33 mmol/L    POCT Glucose, Arterial 158 (H) 74 - 99 mg/dL    POCT Lactate, Arterial 0.9 0.4 - 2.0 mmol/L    POCT Base Excess, Arterial -1.8 -2.0 - 3.0 mmol/L    POCT HCO3 Calculated, Arterial 23.0 22.0 - 26.0 mmol/L    POCT Hemoglobin, Arterial 7.5 (L) 12.0 - 16.0 g/dL    POCT Anion Gap, Arterial 13 10 - 25 mmo/L    Patient Temperature 37.0 degrees Celsius    FiO2 30 %     *Note: Due to a large number of results and/or encounters for the requested time period, some results have not been displayed. A complete set of results can be found in Results Review.      This patient has a central line   Reason for the central line remaining today? Hemodynamic monitoring    This patient has a urinary catheter   Reason for the urinary catheter remaining today? critically ill patient who need accurate urinary output measurements    This patient is intubated   Reason for patient to remain intubated today? Intubation unnecessary, will extubate today             Assessment/Plan   Principal Problem:    Cardiogenic shock (CMS/[facility])  Active Problems:    Heart transplant recipient (CMS/[facility])    Encounter for aftercare following heart transplant (CMS/[facility])    Heart transplant as cause of abnormal reaction or later complication (CMS/[facility])    1) Cardiogenic Shock              Full ECMO/IABP support              [place] from 2/23 results noted              Low dose epinephrine              Fluid pull of 1-2L today, as clinically indicated  2) S/P prior OHTx, 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 Close neurovascular monitoring. Bival  on.  7) Hx of gastric bypass, colitis  8) Hyponatremia              Close monitoring, overall improving  9) Acute blood loss anemia              No active bleeding but downtrend overnight. Transfuse and monitor response.  10) Nutrition              Tube feeds  11) Disp              CTICU. Maintain lines. Discussed with Heart Tx. Palliative medicine consulted.     Due to the high probability of life threatening clinical decompensation, the patient required critical care time evaluating and managing this patient.  Critical care time included obtaining a history, examining the patient, ordering and reviewing studies, discussing, developing, and implementing a management plan, evaluating the patient's response to treatment, and discussion with other care team providers. I saw and evaluated the patient myself. I reviewed the hanane's documentation and discussed the patient with the provider. Critical care time was performed exclusive of billable procedures.    Critical Care Time: 40 minutes   Quang Mehta MD

## 2024-02-24 NOTE — NURSING NOTE
"1020 tylenol 650mg and dilaudid 0.2 mg for c/o pain, when asked pt nodded \"yes\" to back and knee pain  "

## 2024-02-24 NOTE — PROGRESS NOTES
Charla Bowles is a 33 y.o. female on day 9 of admission presenting with Cardiogenic shock (CMS/HCC).    Briefly, the patient is a 33 year old female with past medical history of heart transplant in March 2022 admitted 2/15 with biopsy on 2/16 showing mild ACR.  Sequential escalation of support to IABP (2/18) and [facility]EC[facility] (2/19). She was intubated early morning 2/21/24 due to pulmonary edema.     ECMO NOTE    Patient requires ECMO support. Drainage cannula site, cannula, pump, oxygenator, return cannula and return cannula site clinically inspected. RPM and sweep reviewed and adjusted appropriate to clinical context. Anticoagulation status and intensity discussed. Plan for weaning, next clinical steps discussed with team and family. Discussed with cardiothoracic surgeon, perfusion, palliative care and physical/occupational therapy    ECMO Indication: CS  ECMO Cannula Configuration: FEMFEM  ECMO circuit run from drainage cannula to pump to oxygenator to return cannula: y  Presence of cannula bleeding: n  Presence of oxygenator clot: n  Pre/post PaO2 adequate: y  LV Unloading/Pulse Pressure: iabp, inotrope  Distal Perfusion: monitoring closely, flow is present on doppler  Anticoagulation Plan/Monitoring: bival, PTT  Mobility Plan/Candidacy: extubation today  Path to decannulation/anticipated decannulation date:unclear, monitoring s/p rejection Rx      bivalirudin, 0-0.15 mg/kg/hr, Last Rate: 0.11 mg/kg/hr (02/24/24 0614)  EPINEPHrine, 0.04 mcg/kg/min (Dosing Weight), Last Rate: 0.04 mcg/kg/min (02/23/24 1900)  lactated Ringer's, 5 mL/hr, Last Rate: 5 mL/hr (02/24/24 0700)  lactated Ringer's, 10 mL/hr, Last Rate: Stopped (02/20/24 1715)  PrismaSol 4/2.5, 2,400 mL/hr, Last Rate: 2,400 mL/hr (02/24/24 1242)          Vent Mode: Pressure support  FiO2 (%):  [30 %] 30 %  S RR:  [12] 12  S VT:  [380 mL] 380 mL  PEEP/CPAP (cm H2O):  [0 cm H20-8 cm H20] 0 cm H20  GA SUP:  [0 cm H20-5 cm H20] 0 cm H20  MAP (cm H2O):   [10-11] 11      Assessment/Plan   Principal Problem:    Cardiogenic shock (CMS/HCC)  Active Problems:    Heart transplant recipient (CMS/HCC)    Encounter for aftercare following heart transplant (CMS/HCC)    Heart transplant as cause of abnormal reaction or later complication (CMS/HCC)        Quang Mehta MD

## 2024-02-25 NOTE — PROGRESS NOTES
[facility]  ADVANCED HEART FAILURE AND TRANSPLANT CARDIOLOGY   Charla Bowles/[id number]    Admit Date: 2/15/2024  Hospital Length of Stay: 10   ICU Length of Stay: 5d 17h   Primary Service: CTICU    Charla Bowles is a 33 y.o. female on day 9 of admission presenting with Cardiogenic shock (CMS/HCC).    INTERVAL EVENTS / PERTINENT ROS:    Yesterday evening she was extubated to nasal cannula, is alert and oriented x3, endorses she is able to breathe better. She remains hemodynamically supported with IABP 1:1 + VA-ECMO at flow of 3.5L /min and vasopressor support with Epinephrine remaining at 0.04 mcg. She continues with CVVH for fluid removal, goal today is to remain net even for fluid balance. She continues to experience anemia requiring additional pRBC transfusions for hemoglobin <7.0 this morning, though no obvious source of bleeding.      Objective     Physical Exam  Constitutional:       Appearance: She is ill-appearing.   HENT:      Head: Normocephalic.   Cardiovascular:      Rate and Rhythm: Normal rate and regular rhythm.      Pulses: Normal pulses.      Heart sounds: Normal heart sounds. No murmur heard.     No friction rub. No gallop.      Comments: Left femoral VA ECMO flowing ~3.3L/ FIO2 100%. Right femoral IABP in place set 1:1 and augmenting appropriately.   Pulmonary:      Effort: Pulmonary effort is normal. No accessory muscle usage or retractions.      Breath sounds: No wheezing, rhonchi or rales.      Comments: Breathing comfortable on nasal cannula. Equal chest rise bilaterally.   Abdominal:      General: Abdomen is flat. Bowel sounds are normal. There is no distension.      Palpations: Abdomen is soft. There is no mass.      Tenderness: There is no abdominal tenderness. There is no guarding.      Hernia: No hernia is present.   Musculoskeletal:         General: Swelling: Generalized +1-2 edema.      Cervical back: Full passive range of motion without pain.  "  Skin:     General: Skin is warm and dry.      Capillary Refill: Capillary refill takes less than 2 seconds.      Coloration: Skin is not jaundiced.      Findings: No laceration, rash or wound.      Comments: Left leg cannulation site with mild oozing    Neurological:      General: No focal deficit present.      Comments: Awake, alert, oriented x3, following commands appropriately.       Last Recorded Vitals  Blood pressure 138/76, pulse 103, temperature 37 °C (98.6 °F), temperature source Core, resp. rate 15, height 1.549 m (5' 0.98\"), weight 91.7 kg (202 lb 2.6 oz), SpO2 100 %.  Intake/Output last 3 Shifts:  I/O last 3 completed shifts:  In: 5004.6 (54.6 mL/kg) [P.O.:90; I.V.:1629.6 (17.8 mL/kg); Blood:1175; NG/GT:1460; IV Piggyback:650]  Out: 7559 (82.4 mL/kg) [Urine:305 (0.1 mL/kg/hr); Other:6854; Stool:400]  Weight: 91.7 kg     Relevant Results  Scheduled medications  [Held by provider] aspirin, 81 mg, oral, Daily  calcitriol, 0.5 mcg, oral, Daily  esomeprazole, 40 mg, nasogastric tube, Daily before breakfast  ferrous sulfate, 60 mg of iron, oral, Daily  insulin regular, 0-5 Units, subcutaneous, q6h  insulin regular, 3 Units, subcutaneous, q6h  levothyroxine, 200 mcg, nasogastric tube, Daily  multivitamin with iron-minerals, 15 mL, oral, Daily  nystatin, 5 mL, Swish & Swallow, q6h  perflutren lipid microspheres, 0.5-10 mL of dilution, intravenous, Once in imaging  perflutren protein A microsphere, 0.5 mL, intravenous, Once in imaging  [Held by provider] polyethylene glycol, 17 g, oral, BID  predniSONE, 10 mg, oral, Daily  [Held by provider] rosuvastatin, 40 mg, oral, Nightly  sirolimus, 1.5 mg, oral, Daily  sodium phosphate, 21 mmol, intravenous, Once  [Held by provider] sulfamethoxazole-trimethoprim, 80 mg of trimethoprim, oral, Daily  sulfur hexafluoride microsphr, 2 mL, intravenous, Once in imaging  sulfur hexafluoride microsphr, 2 mL, intravenous, Once in imaging  tacrolimus, 2.5 mg, oral, q12h TRICIA  [Held " by provider] valGANciclovir, 450 mg, oral, q48h      Continuous medications  [Held by provider] bivalirudin, 0-0.15 mg/kg/hr, Last Rate: Stopped (02/25/24 0800)  EPINEPHrine, 0.04 mcg/kg/min (Dosing Weight), Last Rate: 0.04 mcg/kg/min (02/23/24 1900)  lactated Ringer's, 5 mL/hr, Last Rate: 5 mL/hr (02/25/24 0700)  lactated Ringer's, 10 mL/hr, Last Rate: Stopped (02/20/24 1715)  PrismaSol 4/2.5, 2,400 mL/hr, Last Rate: 2,400 mL/hr (02/25/24 0815)      PRN medications  PRN medications: acetaminophen, calcium gluconate, calcium gluconate, dextrose, dextrose **OR** glucagon, glucagon, HYDROmorphone, HYDROmorphone, artificial tears, magnesium sulfate, magnesium sulfate, oxygen, potassium chloride CR **OR** potassium chloride, potassium chloride, promethazine    Results for orders placed or performed during the hospital encounter of 02/15/24 (from the past 24 hour(s))   aPTT 2 hours after a single therapeutic aPTT   Result Value Ref Range    aPTT 58 (H) 27 - 38 seconds   Blood Gas Arterial Full Panel   Result Value Ref Range    POCT pH, Arterial 7.39 7.38 - 7.42 pH    POCT pCO2, Arterial 38 38 - 42 mm Hg    POCT pO2, Arterial 175 (H) 85 - 95 mm Hg    POCT SO2, Arterial 100 94 - 100 %    POCT Oxy Hemoglobin, Arterial 97.2 94.0 - 98.0 %    POCT Hematocrit Calculated, Arterial 23.0 (L) 36.0 - 46.0 %    POCT Sodium, Arterial 137 136 - 145 mmol/L    POCT Potassium, Arterial 4.2 3.5 - 5.3 mmol/L    POCT Chloride, Arterial 105 98 - 107 mmol/L    POCT Ionized Calcium, Arterial 1.06 (L) 1.10 - 1.33 mmol/L    POCT Glucose, Arterial 158 (H) 74 - 99 mg/dL    POCT Lactate, Arterial 0.9 0.4 - 2.0 mmol/L    POCT Base Excess, Arterial -1.8 -2.0 - 3.0 mmol/L    POCT HCO3 Calculated, Arterial 23.0 22.0 - 26.0 mmol/L    POCT Hemoglobin, Arterial 7.5 (L) 12.0 - 16.0 g/dL    POCT Anion Gap, Arterial 13 10 - 25 mmo/L    Patient Temperature 37.0 degrees Celsius    FiO2 30 %   Blood Gas Arterial Full Panel   Result Value Ref Range    POCT pH,  Arterial 7.40 7.38 - 7.42 pH    POCT pCO2, Arterial 37 (L) 38 - 42 mm Hg    POCT pO2, Arterial 184 (H) 85 - 95 mm Hg    POCT SO2, Arterial 100 94 - 100 %    POCT Oxy Hemoglobin, Arterial 97.7 94.0 - 98.0 %    POCT Hematocrit Calculated, Arterial 22.0 (L) 36.0 - 46.0 %    POCT Sodium, Arterial 135 (L) 136 - 145 mmol/L    POCT Potassium, Arterial 4.5 3.5 - 5.3 mmol/L    POCT Chloride, Arterial 104 98 - 107 mmol/L    POCT Ionized Calcium, Arterial 1.08 (L) 1.10 - 1.33 mmol/L    POCT Glucose, Arterial 185 (H) 74 - 99 mg/dL    POCT Lactate, Arterial 0.6 0.4 - 2.0 mmol/L    POCT Base Excess, Arterial -1.7 -2.0 - 3.0 mmol/L    POCT HCO3 Calculated, Arterial 22.9 22.0 - 26.0 mmol/L    POCT Hemoglobin, Arterial 7.3 (L) 12.0 - 16.0 g/dL    POCT Anion Gap, Arterial 13 10 - 25 mmo/L    Patient Temperature 37.0 degrees Celsius    FiO2 34 %   Renal function panel   Result Value Ref Range    Glucose 179 (H) 74 - 99 mg/dL    Sodium 136 136 - 145 mmol/L    Potassium 4.4 3.5 - 5.3 mmol/L    Chloride 101 98 - 107 mmol/L    Bicarbonate 25 21 - 32 mmol/L    Anion Gap 14 10 - 20 mmol/L    Urea Nitrogen 38 (H) 6 - 23 mg/dL    Creatinine 1.60 (H) 0.50 - 1.05 mg/dL    eGFR 44 (L) >60 mL/min/1.73m*2    Calcium 8.6 8.6 - 10.6 mg/dL    Phosphorus 3.2 2.5 - 4.9 mg/dL    Albumin 4.6 3.4 - 5.0 g/dL   Magnesium   Result Value Ref Range    Magnesium 2.63 (H) 1.60 - 2.40 mg/dL   CBC   Result Value Ref Range    WBC 9.8 4.4 - 11.3 x10*3/uL    nRBC 0.8 (H) 0.0 - 0.0 /100 WBCs    RBC 2.48 (L) 4.00 - 5.20 x10*6/uL    Hemoglobin 7.0 (L) 12.0 - 16.0 g/dL    Hematocrit 21.8 (L) 36.0 - 46.0 %    MCV 88 80 - 100 fL    MCH 28.2 26.0 - 34.0 pg    MCHC 32.1 32.0 - 36.0 g/dL    RDW 16.2 (H) 11.5 - 14.5 %    Platelets 61 (L) 150 - 450 x10*3/uL   Calcium, Ionized   Result Value Ref Range    POCT Calcium, Ionized 1.09 (L) 1.1 - 1.33 mmol/L   POCT GLUCOSE   Result Value Ref Range    POCT Glucose 188 (H) 74 - 99 mg/dL   POCT GLUCOSE   Result Value Ref Range    POCT  Glucose 142 (H) 74 - 99 mg/dL   Blood Gas Arterial Full Panel   Result Value Ref Range    POCT pH, Arterial 7.47 (H) 7.38 - 7.42 pH    POCT pCO2, Arterial 35 (L) 38 - 42 mm Hg    POCT pO2, Arterial 177 (H) 85 - 95 mm Hg    POCT SO2, Arterial 100 94 - 100 %    POCT Oxy Hemoglobin, Arterial 97.6 94.0 - 98.0 %    POCT Hematocrit Calculated, Arterial 18.0 (L) 36.0 - 46.0 %    POCT Sodium, Arterial 135 (L) 136 - 145 mmol/L    POCT Potassium, Arterial 4.6 3.5 - 5.3 mmol/L    POCT Chloride, Arterial      POCT Ionized Calcium, Arterial 1.11 1.10 - 1.33 mmol/L    POCT Glucose, Arterial 153 (H) 74 - 99 mg/dL    POCT Lactate, Arterial 0.7 0.4 - 2.0 mmol/L    POCT Base Excess, Arterial 1.7 -2.0 - 3.0 mmol/L    POCT HCO3 Calculated, Arterial 25.5 22.0 - 26.0 mmol/L    POCT Hemoglobin, Arterial 6.0 (LL) 12.0 - 16.0 g/dL    POCT Anion Gap, Arterial      Patient Temperature 37.0 degrees Celsius    FiO2 30 %   Renal function panel   Result Value Ref Range    Glucose 154 (H) 74 - 99 mg/dL    Sodium 137 136 - 145 mmol/L    Potassium 4.5 3.5 - 5.3 mmol/L    Chloride 101 98 - 107 mmol/L    Bicarbonate 26 21 - 32 mmol/L    Anion Gap 15 10 - 20 mmol/L    Urea Nitrogen 38 (H) 6 - 23 mg/dL    Creatinine 1.58 (H) 0.50 - 1.05 mg/dL    eGFR 44 (L) >60 mL/min/1.73m*2    Calcium 8.7 8.6 - 10.6 mg/dL    Phosphorus 1.9 (L) 2.5 - 4.9 mg/dL    Albumin 4.6 3.4 - 5.0 g/dL   Lactate Dehydrogenase   Result Value Ref Range     (H) 84 - 246 U/L   Hepatic function panel   Result Value Ref Range    Albumin 4.6 3.4 - 5.0 g/dL    Bilirubin, Total 0.6 0.0 - 1.2 mg/dL    Bilirubin, Direct 0.2 0.0 - 0.3 mg/dL    Alkaline Phosphatase 44 33 - 110 U/L    ALT 73 (H) 7 - 45 U/L    AST 77 (H) 9 - 39 U/L    Total Protein 6.4 6.4 - 8.2 g/dL   aPTT every AM when therapeutic for 2 consecutive   Result Value Ref Range    aPTT 56 (H) 27 - 38 seconds   Calcium, Ionized   Result Value Ref Range    POCT Calcium, Ionized 1.08 (L) 1.1 - 1.33 mmol/L   CBC   Result Value  Ref Range    WBC 7.9 4.4 - 11.3 x10*3/uL    nRBC 0.9 (H) 0.0 - 0.0 /100 WBCs    RBC 2.25 (L) 4.00 - 5.20 x10*6/uL    Hemoglobin 6.3 (LL) 12.0 - 16.0 g/dL    Hematocrit 18.7 (L) 36.0 - 46.0 %    MCV 83 80 - 100 fL    MCH 28.0 26.0 - 34.0 pg    MCHC 33.7 32.0 - 36.0 g/dL    RDW 16.0 (H) 11.5 - 14.5 %    Platelets 58 (L) 150 - 450 x10*3/uL   Magnesium   Result Value Ref Range    Magnesium 2.70 (H) 1.60 - 2.40 mg/dL   Prepare RBC: 1 Units   Result Value Ref Range    PRODUCT CODE F8537J64     Unit Number [id number]     Unit ABO O     Unit RH POS     XM INTEP COMP     Dispense Status TR     Blood Expiration Date March 23, 2024 23:59 EDT     PRODUCT BLOOD TYPE 5100     UNIT VOLUME 277    CBC   Result Value Ref Range    WBC 7.8 4.4 - 11.3 x10*3/uL    nRBC 0.6 (H) 0.0 - 0.0 /100 WBCs    RBC 2.51 (L) 4.00 - 5.20 x10*6/uL    Hemoglobin 6.9 (L) 12.0 - 16.0 g/dL    Hematocrit 20.8 (L) 36.0 - 46.0 %    MCV 83 80 - 100 fL    MCH 27.5 26.0 - 34.0 pg    MCHC 33.2 32.0 - 36.0 g/dL    RDW 15.9 (H) 11.5 - 14.5 %    Platelets 62 (L) 150 - 450 x10*3/uL   Blood Gas Arterial Full Panel   Result Value Ref Range    POCT pH, Arterial 7.41 7.38 - 7.42 pH    POCT pCO2, Arterial 40 38 - 42 mm Hg    POCT pO2, Arterial 139 (H) 85 - 95 mm Hg    POCT SO2, Arterial 100 94 - 100 %    POCT Oxy Hemoglobin, Arterial 96.6 94.0 - 98.0 %    POCT Hematocrit Calculated, Arterial 21.0 (L) 36.0 - 46.0 %    POCT Sodium, Arterial 135 (L) 136 - 145 mmol/L    POCT Potassium, Arterial 4.4 3.5 - 5.3 mmol/L    POCT Chloride, Arterial 105 98 - 107 mmol/L    POCT Ionized Calcium, Arterial 1.11 1.10 - 1.33 mmol/L    POCT Glucose, Arterial 169 (H) 74 - 99 mg/dL    POCT Lactate, Arterial 0.4 0.4 - 2.0 mmol/L    POCT Base Excess, Arterial 0.7 -2.0 - 3.0 mmol/L    POCT HCO3 Calculated, Arterial 25.4 22.0 - 26.0 mmol/L    POCT Hemoglobin, Arterial 7.0 (L) 12.0 - 16.0 g/dL    POCT Anion Gap, Arterial 9 (L) 10 - 25 mmo/L    Patient Temperature 37.0 degrees Celsius    FiO2  30 %   Prepare RBC: 1 Units   Result Value Ref Range    PRODUCT CODE [id number]     Unit Number [id number]     Unit ABO O     Unit RH POS     XM INTEP COMP     Dispense Status IS     Blood Expiration Date March 29, 2024 23:59 EDT     PRODUCT BLOOD TYPE 5100     UNIT VOLUME 350      *Note: Due to a large number of results and/or encounters for the requested time period, some results have not been displayed. A complete set of results can be found in Results Review.         Assessment/Plan   Assessment: Ms. 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    Originally presented to [facility] ED on 2/15/24 with complaints of N/V x 3 days and inability to keep medications down. POCUS revealed acute systolic heart failure w/ severe BIV dysfunction when compared to previous images in 11/2023. Also noted to have HIRAM and transaminitis. Immunosuppression notably below therapeutic range. Admitted to HFICU and treated for suspected acute cellular vs. acute antibody mediated rejection; however, cardiac biopsy negative for signs of rejection. Despite rejection therapy, inotropes and MCS via IABP, patient's end organ function continued to worsen without improvement in cardiac function. Ultimately placed on left femoral VA ECMO w/ Dr. Marina on 2/19/2024 and transferred to CTICU.     CTICU course complicated by anemia requiring blood product transfusions, volume overload & intubation (tachypnea and increased work of breathing 2/2 pulmonary edema). Status post extubation on 2/24.    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  #Acute renal failure 2/2 to cardiorenal syndrome - requiring CVVH  #Acute transaminitis - gradually downtrending    Donor/Recipient Infectious history:  CMV: -/+ (last collected 2/16/24)  Toxo: -/-   Hep C: -/-    Rejection/Prophylaxis (transplants):  - Steroids: Prednisone taper started 2/18/24 following 3xmethylprednisone pulse: Decrease daily, PO prednisone 60mg, 50mg, 40mg, 30mg, 20mg and then continue on 10mg daily   - Tacrolimus: 2.0mg PO @ 0630 & 2.0mg PO @ 1830 w/ daily levels drawn @ 0600  - Serolimus: 1.0mg PO daily w/ daily levels drawn at 0600  - Tacrolimus goal troughs: 3-5  - Serolimus goal troughs: 7-9  - Combined sirolimus/tacrolimus goal of 10-12  - Antifungals: nystatin 500,000units Q6  - Antivirals: valcyte 450mg Q48hrs --> On hold (2/23)  - Anti PCP & Toxoplasmosis: Bactrim SS daily  --> On hold (2/23)    Last cardiac biopsy: 2/16/24 with ACR1 and no AMR  Last HLA: 2/16/24 negative for DSAs   Last RHC: 2/16/24 w/ RA 11, PAP 34/14(23), W 19 and C.I. 2.3  Last LHC: 2/18/24 negative for CAV and CAD   Last TTE/BOB: 2/20/24 continue to show severe BIV dysfunction   Osteopenia/osteoporosis prophylaxis: Vitamin D3 and calcium supplements  Peptic/gastric ulcer prophylaxis: Pantoprazole 40mg daily   CAV Prophylaxis: Hold Aspirin 81mg daily while on systemic heparin. Okay to resume pravastatin      - Unclear cause of acute severe graft dysfunction. Differentials include: acute ACR vs. AMR vs. viral myocarditis vs. acute lupus myocarditis vs. Vasculitis; however, 2xallograft biopsies on 2/16 & 2/20 remain negative for any significant pathology. Notably, both biopsies taken after rejection therapies implemented which may have reduced areas of graft damage.    - DSAs remain negative; however, patient may have non-HLA antibodies present. 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   - Negative CAV & CAD via LHC on 2/18/24   - Remains on MCS via right femoral IABP set 1:1  and augmenting appropriately & left femoral VA ECMO w/ appropriate flows ~3.3L/FIO2 100%.  - Remains intubated due to pulmonary edema   - Remains on CVVH due to ARF   - LFTs gradually trending down and lactate normalized.    - Completed methylpred steroid pulse w/ 1g Q24 x 3 days (2/16-2/18)  - Thymoglobulin doses: 2/18 & 2/19   - IVIG + PLEX Session: 2/18 & 2/20   - No further thymo, PLEX or IVIG treatment at this time with multiple biopsies negative for ACR & AMR   - Patient does have improved pulsatility with epinephrine infusion in place. Chest x-ray also shows improved lung fields with volume removal. Lastly, patient now producing UOP and displaying signs of renal recovery.    - ECMO turndown trial on 2/23 showed unchanged LVEF ~10%       Recommendations:  - Continue hemodynamic support with VA-ECMO, IABP 1:1, vasopressors (epi 0.04).  - Continue Prednisone 10mg daily   - Continue Tacrolimus 2.5 mg BID. 2/24 tacrolimus trough level 3.2 (goal 3-5).  - Continue sirolimus 1.5mg daily. 2/24 sirolimus trough level 3.0 (goal 7-9).  - Hold Valcyte and Bactrim for now.   - Thrombocytopenia likely multifactorial from recent thymoglobulin, MCS & CVVH. Continue to hold aspirin and agree with PF4 to rule out HIT.  - Agree with holding bivalirudin in the setting of possible blood loss anemia as she is requiring 2 units of pRBC transfusion this morning. CTICU team will likely proceed with CT-abd/pelvis if she remains anemic despite blood transfusions.  - Plan for limited echocardiogram today, can obtain formal echocardiogram tomorrow with ECMO turn-down trial  - Continue CVVH with target net even fluid balance, can trial lasix 80mg IVP today, per nephrology service renal function is improving  - Follow-up CMV PCR which is currently in process  - At this time due to the patient's critical illness she is not a candidate for single or dual organ transplantation. She has demonstrated improvement of respiratory status after  extubation, she has also demonstrated renal improvement per nephrology service and can decrease CVVH support with trial of lasix 80mg IVP today. Will continue to reassess her cardiac function for improvement with limited echocardiogram today and formal echocardiogram tomorrow with ECMO turndown trial to further guide management.       Patient remains critically ill with multisystem organ failure requiring VA ECMO, IABP, CVVH for support, however respiratory status is improving with extubation yesterday, and per nephrology service her renal function is improving, can decrease CVVH support and trial lasix 80mg IVP today. Appreciate continued CTICU care/management.      Patient was examined and discussed with [facility] Failure [facility] Transplant Cardiology attending physician, Dr. Lexie Wright.      Signed,  Gwendolyn Del Valle MD  Heart Failure Fellow PGY4

## 2024-02-25 NOTE — PROGRESS NOTES
"Subjective     Pt extubated yesterday, BS control very good with regular insulin. Still on TF. Pt is hungry and wants to eat but is on ice chips and sips.     Physical Exam  PE:  Constitutional: NAD, well groomed. AOx3. Cooperative  Skin/Hair: Warm, dry skin.  HEENT: EOMI, Anicteric scleras . NG tube in place  Neck: Soft, supple   Cardiovascular: normal HR  Respiratory: no increased wob,  accessory muscle use.     Last Recorded Vitals  Blood pressure 138/76, pulse 103, temperature 37 °C (98.6 °F), temperature source Core, resp. rate 15, height 1.549 m (5' 0.98\"), weight 91.7 kg (202 lb 2.6 oz), SpO2 100 %.  99}  Lab Results   Component Value Date    HGBA1C 6.3 (H) 02/16/2024    HGBA1C 5.7 (A) 03/17/2023    HGBA1C 6.4 (A) 12/14/2022     02/24/2024    K 4.5 02/24/2024     02/24/2024    CO2 26 02/24/2024    BUN 38 (H) 02/24/2024    CREATININE 1.58 (H) 02/24/2024    CALCIUM 8.7 02/24/2024    ALBUMIN 4.6 02/24/2024    ALBUMIN 4.6 02/24/2024    PROT 6.4 02/24/2024    BILITOT 0.6 02/24/2024    ALKPHOS 44 02/24/2024    ALT 73 (H) 02/24/2024    AST 77 (H) 02/24/2024    GLUCOSE 154 (H) 02/24/2024    CHOL 162 02/16/2024    TRIG 142 02/16/2024    HDL 39.2 02/16/2024      Assessment and plan    PreDM2 with solumedrol induced hyperglycemia in setting of heart allograft rejection     History Of Present Illness  Charla Bowles is a 33 y.o. female presenting with PMHx of stage D HFrEF (PPCM) s/p ICD s/p CardioMEMs, hypothyroidism 2/2 thyroidectomy on replacement therapy, obesity s/p gastric bypass (2017), and SLE who is s/p OHT (3/31/2022) with a post-OHT course complicated by RIJ/RUE DVTs, leukopenia, left groin seroma, worsening renal function, asymptomatic 2R ACR (11/2022) treated with steroids, and thrush who presented to the [facility] ED for nausea and vomiting. Given patient's severely reduced EF patient was started on a milrinone drip and levophed was also started due to hypotension. CXR was obtained which " showed an enlarged cardiac silhouette. Pt. Was admitted to the HFICU for cardiogenic shock and concern for acute rejection.    Resumed home medications and pulse dose steroids x3 days as solumedrol 1g q24. PO steroid taper TBD by heart biopsy results. Likely pred 40-60mg starting.      Diabetes history  -A1C historically remains <6.5% in preDM range   - pt did experience steroid induced hyperglycemia at time of her initial OHT in 3/2022  - on no glucose control meds at home        PLAN:     2/15: start solumedrol 1g pulse q24hr x3 days  2/18: start prednisone 60mg PO  2/19: pred 50mg  2/20: pred 40mg  2/21: pred 30mg  2/22: pred 20mg  2/23: pred 10mg    pt is NPO and on continuous TF . No plans to advance diet at this time despite extubation.      Plan   - Goal BG <180  - Regular insulin 3 units q6h   - Regular insulin #  1 sliding scale q6h  -Accuchecks q6h  -Hypoglycemia protocol    -Will continue to follow and titrate insulin accordingly     Dispo:  pt may MDI insulin at discharge to address her prednisone taper - exact dose TBD by titration.  SGLT2i tx would benefit both CHF and CKD once tacrolimus levels are returned to maintenance regimen - likely 4-6 months after this allograft rejection episode.     - pt's  Endocrinologist moved and she will need to re-establish endocrine care for hypothyroidism and follow up on steroid induced hyperglycemia, appointment requested for post transplant clinic with Elisabeth Acevedo PA-C in [place] [id number] on 4/22/24       Pramod Rivera MD  Endocrinology fellow, PGY 5  [id number] or secure chat

## 2024-02-25 NOTE — PROGRESS NOTES
----- Message from Joni Lucia MD sent at 6/23/2021 12:53 PM EDT -----  06/23/21       Tell her that her lab work looks normal.  I do not have the results of the golimumab trough level and antibody level yet.  Please fax a copy of this report to her PCP.  Thx. kjh   Vascular Surgery Progress Note;    Charla Bowles  1990  [id number]    Reason for admission:  Charla Bowles is a 33 y.o. female on day 10 of admission presenting with Cardiogenic shock (CMS/HCC).    Subjective   No events overnight     Patient extubated. No headaches, lightheadedness , dizziness, chest pain, SOB, abdominal pain.  Does endorse some pain in her LLE/cold sensation.    Objective   Last Recorded Vitals  Heart Rate:  [103-131]   Temp:  [36.7 °C (98.1 °F)-37.1 °C (98.8 °F)]   Resp:  [15-27]   BP: (123-138)/(71-76)   SpO2:  [97 %-100 %]     Intake/Output last 3 Shifts:  I/O last 3 completed shifts:  In: 5254.6 (57.3 mL/kg) [P.O.:90; I.V.:1629.6 (17.8 mL/kg); Blood:1175; NG/GT:1460; IV Piggyback:900]  Out: 7559 (82.4 mL/kg) [Urine:305 (0.1 mL/kg/hr); Other:6854; Stool:400]  Weight: 91.7 kg     AA female, lying in bed.   Sinus tachy on monitor  Satting well on current ECMO and NC  Abdomen soft, nontender  R groin with IABP in place, L groin with venous and arterial cannulae in place. Bilateral LE warm to knees, with gradient coolness to foot where she is cool bilaterally.   Palpable  RLE  pulses, multiphasic LLE DP and PT    Relevant Results  Lab Results   Component Value Date    WBC 7.8 02/25/2024    HGB 6.9 (L) 02/25/2024    HCT 20.8 (L) 02/25/2024    MCV 83 02/25/2024    PLT 62 (L) 02/25/2024     Lab Results   Component Value Date    GLUCOSE 154 (H) 02/24/2024    CALCIUM 8.7 02/24/2024     02/24/2024    K 4.5 02/24/2024    CO2 26 02/24/2024     02/24/2024    BUN 38 (H) 02/24/2024    CREATININE 1.58 (H) 02/24/2024       Active Medications  Scheduled medications  [Held by provider] aspirin, 81 mg, oral, Daily  calcitriol, 0.5 mcg, oral, Daily  esomeprazole, 40 mg, nasogastric tube, Daily before breakfast  ferrous sulfate, 60 mg of iron, oral, Daily  furosemide, 80 mg, intravenous, Once  insulin regular, 0-5 Units, subcutaneous, q6h  insulin regular, 3 Units, subcutaneous,  q6h  levothyroxine, 200 mcg, nasogastric tube, Daily  multivitamin with iron-minerals, 15 mL, oral, Daily  nystatin, 5 mL, Swish & Swallow, q6h  perflutren lipid microspheres, 0.5-10 mL of dilution, intravenous, Once in imaging  perflutren lipid microspheres, 0.5-10 mL of dilution, intravenous, Once in imaging  perflutren protein A microsphere, 0.5 mL, intravenous, Once in imaging  [Held by provider] polyethylene glycol, 17 g, oral, BID  predniSONE, 10 mg, oral, Daily  [Held by provider] rosuvastatin, 40 mg, oral, Nightly  sirolimus, 1.5 mg, oral, Daily  sodium phosphate, 21 mmol, intravenous, Once  [Held by provider] sulfamethoxazole-trimethoprim, 80 mg of trimethoprim, oral, Daily  sulfur hexafluoride microsphr, 2 mL, intravenous, Once in imaging  sulfur hexafluoride microsphr, 2 mL, intravenous, Once in imaging  tacrolimus, 2.5 mg, oral, q12h TRICIA  [Held by provider] valGANciclovir, 450 mg, oral, q48h      Continuous medications  [Held by provider] bivalirudin, 0-0.15 mg/kg/hr, Last Rate: Stopped (02/25/24 0800)  EPINEPHrine, 0.04 mcg/kg/min (Dosing Weight), Last Rate: 0.04 mcg/kg/min (02/23/24 1900)  lactated Ringer's, 5 mL/hr, Last Rate: 5 mL/hr (02/25/24 0700)  lactated Ringer's, 10 mL/hr, Last Rate: Stopped (02/20/24 1715)  PrismaSol 4/2.5, 2,400 mL/hr, Last Rate: 2,400 mL/hr (02/25/24 0815)      PRN medications  PRN medications: acetaminophen, calcium gluconate, calcium gluconate, dextrose, dextrose **OR** glucagon, glucagon, HYDROmorphone, HYDROmorphone, artificial tears, magnesium sulfate, magnesium sulfate, oxygen, potassium chloride CR **OR** potassium chloride, potassium chloride, promethazine     Assessment/Plan   Principal Problem:    Cardiogenic shock (CMS/HCC)  Active Problems:    Heart transplant recipient (CMS/[facility])    Encounter for aftercare following heart transplant (CMS/HCC)    Heart transplant as cause of abnormal reaction or later complication (CMS/HCC)    33F PMHx OHT March 2022 admitted with  cardiogenic shock related to allograft rejection. Currently on ECMO and Impella support (ECMO VV L groin, R CFA IABP) placed 2/19. 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 LLE not frankly cold, however cool and without detectable signals.    2/23: Roughly stable exam. Coolness/temperature gradient stable, difficulty getting L pop signal. Still remains on full MCS   2/25: coolness from mid-shin to toes on LLE, but otherwise temperature gradient  on BLE above this level is similar.     Plan:  -no acute surgical intervention at this time but will continue to assess and be available for any future need.  - Would recommend flushing reperfusion cannula to assess flow and patency and ensure this is functioning    Discussed with Dr. Carr.        Velia John MD  PGY5 - Integrated Vascular Surgery  [phone]
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
## 2024-02-25 NOTE — PROGRESS NOTES
"Charla Bowles is a 33 y.o. female on day 10 of admission presenting with Cardiogenic shock (CMS/HCC).    Briefly, the patient is a 33 year old female with past medical history of heart transplant in March 2022 admitted 2/15 with biopsy on 2/16 showing mild ACR.  Sequential escalation of support to IABP (2/18) and VA-ECMO (2/19) and concurrent treatment for rejection. Intubated on 2/21 due to pulmonary edema with subsequent extubation on 2/24.      Objective     Physical Exam    Last Recorded Vitals  Blood pressure 123/71, pulse (!) 119, temperature 37.1 °C (98.8 °F), temperature source Core, resp. rate 19, height 1.549 m (5' 0.98\"), weight 91.7 kg (202 lb 2.6 oz), SpO2 94 %.  Intake/Output last 3 Shifts:  I/O last 3 completed shifts:  In: 5254.6 (57.3 mL/kg) [P.O.:90; I.V.:1629.6 (17.8 mL/kg); Blood:1175; NG/GT:1460; IV Piggyback:900]  Out: 7559 (82.4 mL/kg) [Urine:305 (0.1 mL/kg/hr); Other:6854; Stool:400]  Weight: 91.7 kg     Relevant Results  Results for orders placed or performed during the hospital encounter of 02/15/24 (from the past 24 hour(s))   Blood Gas Arterial Full Panel   Result Value Ref Range    POCT pH, Arterial 7.40 7.38 - 7.42 pH    POCT pCO2, Arterial 37 (L) 38 - 42 mm Hg    POCT pO2, Arterial 184 (H) 85 - 95 mm Hg    POCT SO2, Arterial 100 94 - 100 %    POCT Oxy Hemoglobin, Arterial 97.7 94.0 - 98.0 %    POCT Hematocrit Calculated, Arterial 22.0 (L) 36.0 - 46.0 %    POCT Sodium, Arterial 135 (L) 136 - 145 mmol/L    POCT Potassium, Arterial 4.5 3.5 - 5.3 mmol/L    POCT Chloride, Arterial 104 98 - 107 mmol/L    POCT Ionized Calcium, Arterial 1.08 (L) 1.10 - 1.33 mmol/L    POCT Glucose, Arterial 185 (H) 74 - 99 mg/dL    POCT Lactate, Arterial 0.6 0.4 - 2.0 mmol/L    POCT Base Excess, Arterial -1.7 -2.0 - 3.0 mmol/L    POCT HCO3 Calculated, Arterial 22.9 22.0 - 26.0 mmol/L    POCT Hemoglobin, Arterial 7.3 (L) 12.0 - 16.0 g/dL    POCT Anion Gap, Arterial 13 10 - 25 mmo/L    Patient Temperature " 37.0 degrees Celsius    FiO2 34 %   Renal function panel   Result Value Ref Range    Glucose 179 (H) 74 - 99 mg/dL    Sodium 136 136 - 145 mmol/L    Potassium 4.4 3.5 - 5.3 mmol/L    Chloride 101 98 - 107 mmol/L    Bicarbonate 25 21 - 32 mmol/L    Anion Gap 14 10 - 20 mmol/L    Urea Nitrogen 38 (H) 6 - 23 mg/dL    Creatinine 1.60 (H) 0.50 - 1.05 mg/dL    eGFR 44 (L) >60 mL/min/1.73m*2    Calcium 8.6 8.6 - 10.6 mg/dL    Phosphorus 3.2 2.5 - 4.9 mg/dL    Albumin 4.6 3.4 - 5.0 g/dL   Magnesium   Result Value Ref Range    Magnesium 2.63 (H) 1.60 - 2.40 mg/dL   CBC   Result Value Ref Range    WBC 9.8 4.4 - 11.3 x10*3/uL    nRBC 0.8 (H) 0.0 - 0.0 /100 WBCs    RBC 2.48 (L) 4.00 - 5.20 x10*6/uL    Hemoglobin 7.0 (L) 12.0 - 16.0 g/dL    Hematocrit 21.8 (L) 36.0 - 46.0 %    MCV 88 80 - 100 fL    MCH 28.2 26.0 - 34.0 pg    MCHC 32.1 32.0 - 36.0 g/dL    RDW 16.2 (H) 11.5 - 14.5 %    Platelets 61 (L) 150 - 450 x10*3/uL   Calcium, Ionized   Result Value Ref Range    POCT Calcium, Ionized 1.09 (L) 1.1 - 1.33 mmol/L   POCT GLUCOSE   Result Value Ref Range    POCT Glucose 188 (H) 74 - 99 mg/dL   POCT GLUCOSE   Result Value Ref Range    POCT Glucose 142 (H) 74 - 99 mg/dL   Blood Gas Arterial Full Panel   Result Value Ref Range    POCT pH, Arterial 7.47 (H) 7.38 - 7.42 pH    POCT pCO2, Arterial 35 (L) 38 - 42 mm Hg    POCT pO2, Arterial 177 (H) 85 - 95 mm Hg    POCT SO2, Arterial 100 94 - 100 %    POCT Oxy Hemoglobin, Arterial 97.6 94.0 - 98.0 %    POCT Hematocrit Calculated, Arterial 18.0 (L) 36.0 - 46.0 %    POCT Sodium, Arterial 135 (L) 136 - 145 mmol/L    POCT Potassium, Arterial 4.6 3.5 - 5.3 mmol/L    POCT Chloride, Arterial      POCT Ionized Calcium, Arterial 1.11 1.10 - 1.33 mmol/L    POCT Glucose, Arterial 153 (H) 74 - 99 mg/dL    POCT Lactate, Arterial 0.7 0.4 - 2.0 mmol/L    POCT Base Excess, Arterial 1.7 -2.0 - 3.0 mmol/L    POCT HCO3 Calculated, Arterial 25.5 22.0 - 26.0 mmol/L    POCT Hemoglobin, Arterial 6.0 (LL) 12.0 -  16.0 g/dL    POCT Anion Gap, Arterial      Patient Temperature 37.0 degrees Celsius    FiO2 30 %   Renal function panel   Result Value Ref Range    Glucose 154 (H) 74 - 99 mg/dL    Sodium 137 136 - 145 mmol/L    Potassium 4.5 3.5 - 5.3 mmol/L    Chloride 101 98 - 107 mmol/L    Bicarbonate 26 21 - 32 mmol/L    Anion Gap 15 10 - 20 mmol/L    Urea Nitrogen 38 (H) 6 - 23 mg/dL    Creatinine 1.58 (H) 0.50 - 1.05 mg/dL    eGFR 44 (L) >60 mL/min/1.73m*2    Calcium 8.7 8.6 - 10.6 mg/dL    Phosphorus 1.9 (L) 2.5 - 4.9 mg/dL    Albumin 4.6 3.4 - 5.0 g/dL   Lactate Dehydrogenase   Result Value Ref Range     (H) 84 - 246 U/L   Hepatic function panel   Result Value Ref Range    Albumin 4.6 3.4 - 5.0 g/dL    Bilirubin, Total 0.6 0.0 - 1.2 mg/dL    Bilirubin, Direct 0.2 0.0 - 0.3 mg/dL    Alkaline Phosphatase 44 33 - 110 U/L    ALT 73 (H) 7 - 45 U/L    AST 77 (H) 9 - 39 U/L    Total Protein 6.4 6.4 - 8.2 g/dL   aPTT every AM when therapeutic for 2 consecutive   Result Value Ref Range    aPTT 56 (H) 27 - 38 seconds   Calcium, Ionized   Result Value Ref Range    POCT Calcium, Ionized 1.08 (L) 1.1 - 1.33 mmol/L   CBC   Result Value Ref Range    WBC 7.9 4.4 - 11.3 x10*3/uL    nRBC 0.9 (H) 0.0 - 0.0 /100 WBCs    RBC 2.25 (L) 4.00 - 5.20 x10*6/uL    Hemoglobin 6.3 (LL) 12.0 - 16.0 g/dL    Hematocrit 18.7 (L) 36.0 - 46.0 %    MCV 83 80 - 100 fL    MCH 28.0 26.0 - 34.0 pg    MCHC 33.7 32.0 - 36.0 g/dL    RDW 16.0 (H) 11.5 - 14.5 %    Platelets 58 (L) 150 - 450 x10*3/uL   Magnesium   Result Value Ref Range    Magnesium 2.70 (H) 1.60 - 2.40 mg/dL   Prepare RBC: 1 Units   Result Value Ref Range    PRODUCT CODE [id number]     Unit Number [id number]     Unit ABO O     Unit RH POS     XM INTEP COMP     Dispense Status TR     Blood Expiration Date March 23, 2024 23:59 EDT     PRODUCT BLOOD TYPE 5100     UNIT VOLUME 277    Tacrolimus level   Result Value Ref Range    Tacrolimus  2.6 <=15.0 ng/mL   Sirolimus level   Result Value Ref  Range    Sirolimus  3.6 (L) 4.0 - 20.0 ng/mL   CBC   Result Value Ref Range    WBC 7.8 4.4 - 11.3 x10*3/uL    nRBC 0.6 (H) 0.0 - 0.0 /100 WBCs    RBC 2.51 (L) 4.00 - 5.20 x10*6/uL    Hemoglobin 6.9 (L) 12.0 - 16.0 g/dL    Hematocrit 20.8 (L) 36.0 - 46.0 %    MCV 83 80 - 100 fL    MCH 27.5 26.0 - 34.0 pg    MCHC 33.2 32.0 - 36.0 g/dL    RDW 15.9 (H) 11.5 - 14.5 %    Platelets 62 (L) 150 - 450 x10*3/uL   Blood Gas Arterial Full Panel   Result Value Ref Range    POCT pH, Arterial 7.41 7.38 - 7.42 pH    POCT pCO2, Arterial 40 38 - 42 mm Hg    POCT pO2, Arterial 139 (H) 85 - 95 mm Hg    POCT SO2, Arterial 100 94 - 100 %    POCT Oxy Hemoglobin, Arterial 96.6 94.0 - 98.0 %    POCT Hematocrit Calculated, Arterial 21.0 (L) 36.0 - 46.0 %    POCT Sodium, Arterial 135 (L) 136 - 145 mmol/L    POCT Potassium, Arterial 4.4 3.5 - 5.3 mmol/L    POCT Chloride, Arterial 105 98 - 107 mmol/L    POCT Ionized Calcium, Arterial 1.11 1.10 - 1.33 mmol/L    POCT Glucose, Arterial 169 (H) 74 - 99 mg/dL    POCT Lactate, Arterial 0.4 0.4 - 2.0 mmol/L    POCT Base Excess, Arterial 0.7 -2.0 - 3.0 mmol/L    POCT HCO3 Calculated, Arterial 25.4 22.0 - 26.0 mmol/L    POCT Hemoglobin, Arterial 7.0 (L) 12.0 - 16.0 g/dL    POCT Anion Gap, Arterial 9 (L) 10 - 25 mmo/L    Patient Temperature 37.0 degrees Celsius    FiO2 30 %   Prepare RBC: 1 Units   Result Value Ref Range    PRODUCT CODE [id number]     Unit Number [id number]     Unit ABO O     Unit RH POS     XM INTEP COMP     Dispense Status TR     Blood Expiration Date March 29, 2024 23:59 EDT     PRODUCT BLOOD TYPE 5100     UNIT VOLUME 350      *Note: Due to a large number of results and/or encounters for the requested time period, some results have not been displayed. A complete set of results can be found in Results Review.      Assessment/Plan   Principal Problem:    Cardiogenic shock (CMS/HCC)  Active Problems:    Heart transplant recipient (CMS/[facility])    Encounter for aftercare following heart  transplant (CMS/HCC)    Heart transplant as cause of abnormal reaction or later complication (CMS/HCC)    1) Cardiogenic Shock              Echo with turn down, otherwise maintain at 3L              Low dose epinephrine              Fluid pull - will slow CRRT pull and administer furosemide and gauge response. Reviewed with Neph/HF.  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 Close neurovascular monitoring.   7) Hx of gastric bypass, colitis  8) Hyponatremia              improved  9) Acute blood loss anemia              Transfuse and monitor response.  10) Nutrition              Tube feeds  11) Disp              CTICU. Maintain lines. Discussed with Heart Tx. Palliative medicine consulted.     Due to the high probability of life threatening clinical decompensation, the patient required critical care time evaluating and managing this patient.  Critical care time included obtaining a history, examining the patient, ordering and reviewing studies, discussing, developing, and implementing a management plan, evaluating the patient's response to treatment, and discussion with other care team providers. I saw and evaluated the patient myself. I reviewed the provider's documentation and discussed the patient with the provider. Critical care time was performed exclusive of billable procedures.    Critical Care Time: 40 minutes   Quang Mehta MD

## 2024-02-25 NOTE — PROGRESS NOTES
CTICU Progress Note  Charla Bowlse/[id number]    Admit Date: 2/15/2024  Hospital Length of Stay: 10   ICU Length of Stay: 5d 20h   CT SURGEON:     SUBJECTIVE: Continues to require transfusion without overt s/s bleeding. AC held.     MEDICATIONS  Infusions:  [Held by provider] bivalirudin, Last Rate: Stopped (02/25/24 0800)  EPINEPHrine, Last Rate: 0.04 mcg/kg/min (02/23/24 1900)  lactated Ringer's, Last Rate: 5 mL/hr (02/25/24 0700)  lactated Ringer's, Last Rate: Stopped (02/20/24 1715)  PrismaSol 4/2.5, Last Rate: 2,400 mL/hr (02/25/24 0815)      Scheduled:  [Held by provider] aspirin, 81 mg, Daily  calcitriol, 0.5 mcg, Daily  esomeprazole, 40 mg, Daily before breakfast  ferrous sulfate, 60 mg of iron, Daily  insulin regular, 0-5 Units, q6h  insulin regular, 3 Units, q6h  levothyroxine, 200 mcg, Daily  multivitamin with iron-minerals, 15 mL, Daily  nystatin, 5 mL, q6h  perflutren lipid microspheres, 0.5-10 mL of dilution, Once in imaging  perflutren lipid microspheres, 0.5-10 mL of dilution, Once in imaging  perflutren protein A microsphere, 0.5 mL, Once in imaging  [Held by provider] polyethylene glycol, 17 g, BID  predniSONE, 10 mg, Daily  [Held by provider] rosuvastatin, 40 mg, Nightly  [START ON 2/26/2024] sirolimus, 2 mg, Daily  sodium phosphate, 21 mmol, Once  [Held by provider] sulfamethoxazole-trimethoprim, 80 mg of trimethoprim, Daily  sulfur hexafluoride microsphr, 2 mL, Once in imaging  sulfur hexafluoride microsphr, 2 mL, Once in imaging  tacrolimus, 3 mg, q12h TRICIA  [Held by provider] valGANciclovir, 450 mg, q48h      PRN:  acetaminophen, 650 mg, q6h PRN  calcium gluconate, 1 g, q6h PRN  calcium gluconate, 2 g, q6h PRN  dextrose, 25 g, q15 min PRN  dextrose, 25 g, q15 min PRN   Or  glucagon, 1 mg, q15 min PRN  glucagon, 1 mg, q15 min PRN  HYDROmorphone, 0.4 mg, q3h PRN  HYDROmorphone, 1 mg, q4h PRN  artificial tears, 2 drop, PRN  magnesium sulfate, 1 g, q6h PRN  magnesium sulfate, 2 g, q6h  "PRN  oxygen, , Continuous PRN - O2/gases  potassium chloride CR, 20 mEq, q6h PRN   Or  potassium chloride, 20 mEq, q6h PRN  potassium chloride, 40 mEq, q6h PRN  promethazine, 12.5 mg, q6h PRN        PHYSICAL EXAM:   Visit Vitals  /71   Pulse (!) 113   Temp 37 °C (98.6 °F) (Core)   Resp 19   Ht 1.549 m (5' 0.98\")   Wt 91.7 kg (202 lb 2.6 oz)   SpO2 100%   BMI 38.22 kg/m²   OB Status Hysterectomy   Smoking Status Never   BSA 1.99 m²     Wt Readings from Last 5 Encounters:   02/23/24 91.7 kg (202 lb 2.6 oz)   12/07/23 92.1 kg (203 lb)   12/01/23 93 kg (205 lb)   11/29/23 92.9 kg (204 lb 12.8 oz)   11/09/23 91.3 kg (201 lb 3.2 oz)     INTAKE/OUTPUT:  I/O last 3 completed shifts:  In: 5254.6 (57.3 mL/kg) [P.O.:90; I.V.:1629.6 (17.8 mL/kg); Blood:1175; NG/GT:1460; IV Piggyback:900]  Out: 7559 (82.4 mL/kg) [Urine:305 (0.1 mL/kg/hr); Other:6854; Stool:400]  Weight: 91.7 kg      Physical Exam:   - CONSTITUTION: critically ill, young appearing female on ECMO and IABP  - NEUROLOGIC: Alert and oriented, CAM negative. Moving all extremities with no focal deficits  - CARDIOVASCULAR: ST. On VA ECMO left femoral vein and artery with distal perfusion catheter. Right femoral IABP 1:1 with appropriate augmentation  - RESPIRATORY: RA  - GI: Singh with clear, hetal urine. CVVH.  - : obese, soft.  Active BS. FMS in place with liquid stool.  - EXTREMITIES: warm, no edema. Well perfused.  Right fem IABP, Left fem VA ECMO, small hematoma, warm extremity down to ankle, left foot cool, dopplerable popliteal, dopplerable PT/DP  - SKIN: Intact  - PSYCHIATRIC: Calm, appropriate    Daily Risk Screen  Extubated  Central line: required- HD, hemodynamic monitoring, parenteral medications  Singh: required, accurate I/O in critically ill    Images:  Morning CXR reviewed.    Invasive Hemodynamics:    Most Recent Range Past 24hrs   BP (Art) 139/66 Arterial Line BP 1  Min: 115/71  Max: 151/71   MAP(Art) 96 mmHg Arterial Line MAP 1 (mmHg)   Min: " 81 mmHg  Max: 103 mmHg   RA/CVP   No data recorded   PA 44/26 PAP  Min: 29/17  Max: 49/29   PA(mean) 32 mmHg PAP (Mean)  Min: 22 mmHg  Max: 38 mmHg   CO 5.7 L/min No data recorded   CI 2.9 L/min/m2 No data recorded   Mixed Venous 84.6 % SVO2 (%)  Min: 52 %  Max: 87.8 %   SVR  758 (dyne*sec)/cm5 No data recorded     ECMO:     Most Recent Range Past 24hrs   Flow 3.58 Patient Flow (L/min)  Min: 3.26  Max: 3.63   Speed 3200 Circuit Flow (RPM)  Min: 3200  Max: 3305   Sweep 1 Sweep Gas Flow Rate (L/min)  Min: 1  Max: 1        Assessment/Plan   33 year old female with past medical history of heart transplant in March 2022 with postoperative course complicated by upper extremity/internal jugular DVTs, and 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 She was admitted to HFICU on 2/15 with concern for cardiogenic shock secondary to allograft rejection and decompensated heart failure with multiorgan dysfunction including significant elevation of liver enzymes and nonoliguric acute kidney injury. Endomyocardial biopsy on 2/16 revealed mild ACR with +CD4s and negative HLAs, however multisystem organ failure persisted with increased inotropic requirements. IABP placed on 2/18. Transferred to CTICU on 2/19 for VA ECMO cannulation with Dr. Marina for persistent multi-organ system dysfunction. Intubated 2/21 for respiratory failure 2/2 pulmonary edema, extubated 2/24.     Plan:  NEURO: No history. Neuro intact and CAM negative. Previously did not tolerate precedex 2/2 bradycardia. Not endorsing pain. -->  - Serial neuro and pain assessments  - PRN Tylenol 650mg q6hr (mild)  - continue PO dilaudid 1mg for moderate pain and IV dilaudid 0.4mg for severe pain  - PT/OT Consult  - CAM ICU score qshift  - Sleep/wake cycle hygiene     CV: Patient has a history of heart transplant in March of 2022 with TTE on 11/29 with LVEF 60-65%. Admitted for cardiogenic shock with concern for acute cellular rejection s/p IABP placement (R fem) 2/18 and VA  ECMO (left fem) 2/19. ECHO 2/20 with persisting severe BiV dysfunction despite negative biopsy 2/20.  Echo with turn-down 2/22 EF 10%, severely reduced RV, mild AR and mild-moderate MR, appears to have improved biventricular function on informal echo with turndown today. Remains on ECMO 3.5L flow/100%FiO2 /sweep 1.  IABP 1:1. On epinephrine 0.04 mcg/kg/min. End organ perfusion stable. . Little change in hemodynamics with turndown to 0.5 L, CVP increased from 7 to 13.-->  - Maintain goal MAP 70-90  - Maintain IABP 1:1  - continue full ECMO support while optimizing volume status  - Continue epi 0.04mcg/kg/min  - Formal TTE Monday with ECMO turn down  - Daily ECMO gases, LDH   - Hold daily rosuvastatin 40mg, will resume if LFT normalize  - Hold home amlodipine  - Possibility of transition to Impella 5.5 under discussion with cardiac surgery team and heart failure    VASC: Vascular surgery following for diminished pulses in LLE.  CK WNL, LLE warm down to ankle.  Dopplerable popliteal pulses, DP flow seen by ultrasound. Formal arterial duplex 2/22 with slow flow. Today, left leg warmer, pulses dopplerable.  - Continue to trend CK daily  - Keep LLE warm  - Vascular surgery following  - Hold bival     PULM: No history of pulmonary disease. Acute respiratory failure due to tachypnea and acute pulmonary edema on CXR with frothy secretion.  Intubated 2/21 due to respiratory distress 2/2 pulmonary edema.  Extubated 2/24 without issue. CXR with minimal pulmonary edema. -->  - Daily CXR  - Maintain SPO2 > 92%  - Adjust sweep for normal pH     GI: History of gastric bypass and MMF colitis.  Dobhoff in place, TF at goal. Passed bedside swallow. Previous liquid stool improved with fiber. FMS removed today. -->  - Continue daily PPI   - Continue calcitriol 0.5mg daily  - Continue multivitamin  - Continue Calcium carbonate 1,250mg BID  - Start regular diet  - Continue TF with prostat pending adequate oral intake  - Bowel  regimen on hold d/t liquid stools  - discontinue fiber     : No history of renal disease, baseline creatinine 1.2-1.3. Oliguric HIRAM likely in setting of cardiorenal physiology. Renal US from 2/19 unremarkable. Hyponatremia resolved. On CVVH  Singh in place and making minimal urine.  Negative 1.5 L overnight.-->  - continue CVVH running even  - Lasix 80 mg once for diuretic challenge   - RFP BID  - Replete electrolytes per CTICU protocol  - Nephrology following, appreciate recs     ENDO: History of hypothyroidism TSH 12.02, free thyroxine 2.19. A1c: 6.3. Euglycemic. -->  - Maintain BG <180 with hypoglycemia protocol  - Continue regular SSI #3 q6hs  - Start 3U regular insulin q6hrs with SSI  - Continue synthroid 200mcg daily  - Endocrine consulted, appreciate recs     HEME: History of remote DVT. Acute on chronic iron deficiency anemia. Acute blood loss anemia, receieved 2U PRBC 2/24 and again 2/25. Bivalrudin held. No overt s/s bleeding. Stable left groin hematoma and minimal oozing from right and left access sites. Bleeding from ECMO cannulas resolved.  Stable thrombocytopenia. PF4 2/21 pending. -->    - CBC BID  - Hold AC  - hold ASA with thrombocytopenia  - Plan for CT scan if hemoglobin does not increment with AC held  - Continue daily ferrous sulfate  - SCDs for DVT prophylaxis  - Last type and screen: 2/22     Rejection/prophylaxis: S/p heart transplant 3/31/2022. Induction basiliximab. Donor HCV -, toxo -/-, CMV -/+. Mild ACR with +CD4 and negative HLAs however clinical presentation concern for AMR rejection.  PLEX/IVIG 2/17, 2/20. Thymo 2/18, 2/19. Repeat biopsy 2/20 with no evidence of on going rejection (ACR 0R, pAMR0 and no C3D or C4D)  - Completed steroid taper, continue prednisone 10mg daily  - Continue tacrolimus 3 mg BID with goal FK level 3-5  - Increase sirolimus to 2 mg daily, with one time 0.5 mg now with goal level 7-9  - Plan for no further PLEX/IVIG or thymo  - continue Nystatin suspension  500,000 units q6hr  - Hold bactrim and valcyte in setting of thrombocytopenia     ID: Afebrile, no current indications of infection. Received Zosyn and Vancomycin on 2/15 in ED. Blood cultures from 2/15 negative. -->  - Trend temp q4h     Skin: No active skin issues.  - Preventative Mepilex dressings in place on sacrum and heels  - Change preventative Mepilex weekly or more frequently as indicated (when moist/soiled)   - Every shift skin assessment per nursing and weekly ICU skin rounds  - Moisture barrier to be applied with christopher care  - Active skin problems addressed with nursing on daily rounds     Proph:  SCDs  PPI     G: Line  Right radial arterial line placed 2/16  RIJ introducer with PAC placed 2/16  LIF Trialysis placed 2/17  Right femoral IABP placed 2/18  8F Left femoral reperfusion cannula placed 2/19  Left femoral 17F arterial ECMO cannula placed 2/19  Left femoral 25F venous ECMO cannula placed 2/19     F: Family: Mother and aunt updated at bedside    Restraints: Not indicated    Code status: Full Code     I personally spent 55 minutes of critical care time directly and personally managing the patient exclusive of separately billable procedures.     A,B,C,D,E,F,G: reviewed     Discussed with Dr. Mehta, Dr. Wright  Dispo: CTICU care for now.    CTICU TEAM PHONE [phone]    JIMMY Mack-CNP

## 2024-02-26 NOTE — PROGRESS NOTES
CTICU Progress Note  Charla Bowles/[id number]    Admit Date: 2/15/2024  Hospital Length of Stay: 11   ICU Length of Stay: 6d 15h   CT SURGEON:     SUBJECTIVE: No acute overnight events. Has not required transfusion  for 24 hours, hemoglobin remains stable. Echo with turndown with improved contractility. Plan for ECMO decannulation and possible impella 5.5 insertion today.     MEDICATIONS  Infusions:  EPINEPHrine, Last Rate: 0.04 mcg/kg/min (02/26/24 0700)  lactated Ringer's, Last Rate: 5 mL/hr (02/26/24 0700)  lactated Ringer's, Last Rate: Stopped (02/20/24 1715)  PrismaSol 4/2.5, Last Rate: 2,400 mL/hr (02/25/24 1453)      Scheduled:  [Held by provider] aspirin, 81 mg, Daily  calcitriol, 0.5 mcg, Daily  esomeprazole, 40 mg, Daily before breakfast  ferrous sulfate, 60 mg of iron, Daily  insulin regular, 0-5 Units, q6h  [Held by provider] insulin regular, 3 Units, q6h  levothyroxine, 200 mcg, Daily  multivitamin with iron-minerals, 15 mL, Daily  nystatin, 5 mL, q6h  perflutren lipid microspheres, 0.5-10 mL of dilution, Once in imaging  perflutren protein A microsphere, 0.5 mL, Once in imaging  polyethylene glycol, 17 g, BID  potassium, sodium phosphates, 1 packet, 4x daily  predniSONE, 10 mg, Daily  [Held by provider] rosuvastatin, 40 mg, Nightly  sirolimus, 2 mg, Daily  [Held by provider] sulfamethoxazole-trimethoprim, 80 mg of trimethoprim, Daily  sulfur hexafluoride microsphr, 2 mL, Once in imaging  tacrolimus, 3 mg, q12h TRICIA  [Held by provider] valGANciclovir, 450 mg, q48h      PRN:  acetaminophen, 650 mg, q6h PRN  calcium gluconate, 1 g, q6h PRN  calcium gluconate, 2 g, q6h PRN  dextrose, 25 g, q15 min PRN  dextrose, 25 g, q15 min PRN   Or  glucagon, 1 mg, q15 min PRN  glucagon, 1 mg, q15 min PRN  HYDROmorphone, 0.2 mg, q3h PRN  artificial tears, 2 drop, PRN  magnesium sulfate, 1 g, q6h PRN  magnesium sulfate, 2 g, q6h PRN  melatonin, 5 mg, Nightly PRN  oxyCODONE, 10 mg, q4h PRN  oxyCODONE, 5 mg, q4h  "PRN  oxygen, , Continuous PRN - O2/gases  potassium chloride CR, 20 mEq, q6h PRN   Or  potassium chloride, 20 mEq, q6h PRN  potassium chloride, 40 mEq, q6h PRN  promethazine, 12.5 mg, q6h PRN        PHYSICAL EXAM:   Visit Vitals  /71   Pulse 106   Temp 36.7 °C (98.1 °F) (Temporal)   Resp 19   Ht 1.549 m (5' 0.98\")   Wt 91.7 kg (202 lb 2.6 oz)   SpO2 100%   BMI 38.22 kg/m²   OB Status Hysterectomy   Smoking Status Never   BSA 1.99 m²     Wt Readings from Last 5 Encounters:   02/23/24 91.7 kg (202 lb 2.6 oz)   12/07/23 92.1 kg (203 lb)   12/01/23 93 kg (205 lb)   11/29/23 92.9 kg (204 lb 12.8 oz)   11/09/23 91.3 kg (201 lb 3.2 oz)     INTAKE/OUTPUT:  I/O last 3 completed shifts:  In: 4050.5 (44.2 mL/kg) [P.O.:500; I.V.:995.5 (10.9 mL/kg); Blood:700; NG/GT:1505; IV Piggyback:350]  Out: 5602 (61.1 mL/kg) [Urine:2010 (0.6 mL/kg/hr); Other:3592]  Weight: 91.7 kg      Physical Exam:   - CONSTITUTION: critically ill, young appearing female on ECMO and IABP  - NEUROLOGIC: Alert and oriented, CAM negative. Moving all extremities with no focal deficits  - CARDIOVASCULAR: ST. On VA ECMO left femoral vein and artery with distal perfusion catheter. Right femoral IABP 1:1 with appropriate augmentation. Oozing from sites.    - RESPIRATORY: RA  - GI: Singh with clear, hetal urine. CVVH.  - : obese, soft.  Active BS.  - EXTREMITIES: warm, no edema. Well perfused.  Right fem IABP, Left fem VA ECMO, small hematoma, warm extremity down to ankle, left foot cool, dopplerable. Extremities warm, all distal pulses   - SKIN: Intact  - PSYCHIATRIC: Calm, appropriate    Daily Risk Screen  Extubated  Central line: required- HD, hemodynamic monitoring, parenteral medications  Singh: required, accurate I/O in critically ill    Images:  Morning CXR reviewed.    Invasive Hemodynamics:    Most Recent Range Past 24hrs   BP (Art) 130/76 Arterial Line BP 1  Min: 108/68  Max: 156/81   MAP(Art) 99 mmHg Arterial Line MAP 1 (mmHg)   Min: 85 mmHg  " Max: 110 mmHg   RA/CVP   No data recorded   PA 28/20 PAP  Min: 23/15  Max: 50/30   PA(mean) 23 mmHg PAP (Mean)  Min: 17 mmHg  Max: 38 mmHg   CO 5.7 L/min No data recorded   CI 2.9 L/min/m2 No data recorded   Mixed Venous 81 % SVO2 (%)  Min: 59.6 %  Max: 89.1 %   SVR  758 (dyne*sec)/cm5 No data recorded     ECMO:     Most Recent Range Past 24hrs   Flow 3.42 Patient Flow (L/min)  Min: 3.29  Max: 3.61   Speed 3200 Circuit Flow (RPM)  Min: 3200  Max: 3201   Sweep 1 Sweep Gas Flow Rate (L/min)  Min: 1  Max: 1        Assessment/Plan   33 year old female with past medical history of heart transplant in March 2022 with postoperative course complicated by upper extremity/internal jugular DVTs, and 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 She was admitted to HFICU on 2/15 with concern for cardiogenic shock secondary to allograft rejection and decompensated heart failure with multiorgan dysfunction including significant elevation of liver enzymes and nonoliguric acute kidney injury. Endomyocardial biopsy on 2/16 revealed mild ACR with +CD4s and negative HLAs, however multisystem organ failure persisted with increased inotropic requirements. IABP placed on 2/18. Transferred to CTICU on 2/19 for VA ECMO cannulation with Dr. Marina for persistent multi-organ system dysfunction. Intubated 2/21 for respiratory failure 2/2 pulmonary edema, extubated 2/24.     Plan:  NEURO: No history. Neuro intact and CAM negative. Previously did not tolerate precedex 2/2 bradycardia. Not endorsing pain. -->  - Serial neuro and pain assessments  - PRN Tylenol 650mg q6hr (mild)  - continue PO dilaudid 1mg for moderate pain and IV dilaudid 0.4mg for severe pain  - PT/OT Consult  - CAM ICU score qshift  - Sleep/wake cycle hygiene     CV: Patient has a history of heart transplant in March of 2022 with TTE on 11/29 with LVEF 60-65%. Admitted for cardiogenic shock with concern for acute cellular rejection s/p IABP placement (R fem) 2/18 and VA ECMO (left  fem) 2/19. ECHO 2/20 with persisting severe BiV dysfunction despite negative biopsy 2/20.  Echo with turn-down 2/22 EF 10%, severely reduced RV, mild AR and mild-moderate MR, appears to have improved biventricular function on informal echo with turndown today. Remains on ECMO 3.5L flow/100%FiO2 /sweep 1.  IABP 1:1. On epinephrine 0.04 mcg/kg/min. End organ perfusion stable. . Little change in hemodynamics with turndown to 0.5 L, improved biventricular function. -->  - Maintain goal MAP 70-90  - Maintain IABP 1:1  - continue full ECMO support while optimizing volume status  - Continue epi 0.04mcg/kg/min  - PRN hydral 10 mg for MAP >90  - Daily ECMO gases, LDH   - Hold daily rosuvastatin 40mg, will resume if LFT normalize  - Hold home amlodipine  - Possibility of transition to Impella 5.5 under discussion with cardiac surgery team and heart failure    VASC: Vascular surgery following for diminished pulses in LLE.  CK WNL, LLE warm down to ankle. Dopplerable popliteal pulses, DP flow seen by ultrasound. Formal arterial duplex 2/22 with slow flow. Today, left leg warmer, pulses dopplerable in all extremities  - Vascular surgery signed off  - Hold bival     PULM: No history of pulmonary disease. Acute respiratory failure due to tachypnea and acute pulmonary edema on CXR with frothy secretion.  Intubated 2/21 due to respiratory distress 2/2 pulmonary edema.  Extubated 2/24 without issue. CXR with minimal pulmonary edema. -->  - Daily CXR  - Maintain SPO2 > 92%  - Adjust sweep for normal pH     GI: History of gastric bypass and MMF colitis.  Dobhoff in place. Passed bedside swallow. Previous liquid stool improved with fiber. FMS removed today. Transaminitis improved.  -->  - Continue daily PPI   - Continue calcitriol 0.5mg daily  - Continue multivitamin  - Continue Calcium carbonate 1,250mg BID  - NPO pending procedure  - Maintain doboff for now, likely can take out post-op  - Restart miralax BID     : No history  of renal disease, baseline creatinine 1.2-1.3. Oliguric HIRAM likely in setting of cardiorenal physiology. Renal US from 2/19 unremarkable. Hyponatremia resolved. On CVVH.  Yesterday with robust response to lasix.  Negative 1.5 L overnight.-->  - continue CVVH, goal negative 500 ml. Will discontinue pre-op and re-evaluate need post-op  - RFP BID  - Replete electrolytes per CTICU protocol  - Nephrology following, appreciate recs     ENDO: History of hypothyroidism TSH 12.02, free thyroxine 2.19. A1c: 6.3. Euglycemic. -->  - Maintain BG <180 with hypoglycemia protocol  - Continue regular SSI #3 q6hs  - Hold scheduled regular insulin with NPO status  - Continue synthroid 200mcg daily  - Endocrine consulted, appreciate recs     HEME: History of remote DVT. Acute on chronic iron deficiency anemia. Acute blood loss anemia, receieved 2U PRBC 2/24 and again 2/25. Bivalrudin held. No overt s/s bleeding. Stable left groin hematoma and minimal oozing from right and left access sites. Bleeding from ECMO cannulas resolved.  Stable thrombocytopenia. PF4 2/21 negative.  -->    - CBC BID  - Hold AC  - hold ASA with thrombocytopenia  - Continue daily ferrous sulfate  - SCDs for DVT prophylaxis  - Last type and screen: 2/25     Rejection/prophylaxis: S/p heart transplant 3/31/2022. Induction basiliximab. Donor HCV -, toxo -/-, CMV -/+. Mild ACR with +CD4 and negative HLAs however clinical presentation concern for AMR rejection.  PLEX/IVIG 2/17, 2/20. Thymo 2/18, 2/19. Repeat biopsy 2/20 with no evidence of on going rejection (ACR 0R, pAMR0 and no C3D or C4D)  - Completed steroid taper, continue prednisone 10mg daily  - Continue tacrolimus 3 mg BID with goal FK level 3-5  - Increase sirolimus to 2 mg daily, with one time 0.5 mg now with goal level 7-9  - Plan for no further PLEX/IVIG or thymo  - continue Nystatin suspension 500,000 units q6hr  - Hold bactrim and valcyte in setting of thrombocytopenia     ID: Afebrile, no current  indications of infection. Received Zosyn and Vancomycin on 2/15 in ED. Blood cultures from 2/15 negative. -->  - Trend temp q4h     Skin: No active skin issues.  - Preventative Mepilex dressings in place on sacrum and heels  - Change preventative Mepilex weekly or more frequently as indicated (when moist/soiled)   - Every shift skin assessment per nursing and weekly ICU skin rounds  - Moisture barrier to be applied with christopher care  - Active skin problems addressed with nursing on daily rounds     Proph:  SCDs  PPI     G: Line  Right radial arterial line placed 2/16  RIJ introducer with PAC placed 2/16  LIF Trialysis placed 2/17  Right femoral IABP placed 2/18  8F Left femoral reperfusion cannula placed 2/19  Left femoral 17F arterial ECMO cannula placed 2/19  Left femoral 25F venous ECMO cannula placed 2/19     F: Family: Mom updated at bedside this AM.    Restraints: Not indicated    Code status: Full Code     I personally spent 45 minutes of critical care time directly and personally managing the patient exclusive of separately billable procedures.     A,B,C,D,E,F,G: reviewed     Discussed with Dr. Mehta, Dr. Padilla  Dispo: CTICU care for now.    CTICU TEAM PHONE [phone]    JIMMY Mack-CNP

## 2024-02-26 NOTE — PROGRESS NOTES
Occupational Therapy                 Therapy Communication Note    Patient Name: Charla Bowles  MRN: [id number]  Today's Date: 2/26/2024     Discipline: Occupational Therapy    Missed Visit Reason: Missed Visit Reason:  (Patient is not medically appropriate for OT services.)    Missed Time: Attempt

## 2024-02-26 NOTE — PROGRESS NOTES
[facility]  ADVANCED HEART FAILURE AND TRANSPLANT CARDIOLOGY   Charla Bowles/[id number]    Admit Date: 2/15/2024  Hospital Length of Stay: 11   ICU Length of Stay: 6d 17h   Primary Service: CTICU    Charla Bowles is a 33 y.o. female on day 9 of admission presenting with Cardiogenic shock (CMS/HCC).    INTERVAL EVENTS / PERTINENT ROS:    Patient tolerated turndown trial of VA-ECMO yesterday. Repeat 2-D echo yesterday was assessed to have shown improvement in LVEF. She was given Lasix 80 mg IVP x 2 yesterday. Net I/O is negative 1.1 liter in the past 24 hours. She remains on IABP 1:1, VA-ECMO at 3.5 lpm and Epinephrine 0.04 mcg gtt. Her MAP has been stable at 85-90s with good pulsatility. She is still on CVVH. She was transfused 2 units pRBC yesterday. Telemetry monitor showed sinus tachycardia at 110s with frequent Vcs.     Objective     Physical Exam  Constitutional:       General: She is not in acute distress.     Appearance: She is ill-appearing.   HENT:      Head: Normocephalic.      Comments: +Warren-Estevan catheter on right internal jugular, Trialysis line on left IJ     Mouth/Throat:      Mouth: Mucous membranes are moist.   Eyes:      Pupils: Pupils are equal, round, and reactive to light.   Cardiovascular:      Rate and Rhythm: Normal rate and regular rhythm.      Pulses: Normal pulses.      Heart sounds: Normal heart sounds. No murmur heard.     No friction rub. No gallop.      Comments: Left femoral VA ECMO flowing ~3.5L/ FIO2 100%. Right femoral IABP in place set 1:1 and augmenting appropriately.   Pulmonary:      Effort: Pulmonary effort is normal. No accessory muscle usage or retractions.      Breath sounds: No wheezing, rhonchi or rales.      Comments: Breathing comfortable on nasal cannula. Equal chest rise bilaterally.   Abdominal:      General: Abdomen is flat. Bowel sounds are normal. There is no distension.      Palpations: Abdomen is soft. There is no mass.  "     Tenderness: There is no abdominal tenderness. There is no guarding.      Hernia: No hernia is present.   Musculoskeletal:         General: Swelling: Generalized +1-2 edema.      Cervical back: Full passive range of motion without pain.   Skin:     General: Skin is warm and dry.      Capillary Refill: Capillary refill takes less than 2 seconds.      Coloration: Skin is not jaundiced.      Findings: No laceration, rash or wound.   Neurological:      General: No focal deficit present.      Mental Status: She is alert.      Comments: Awake, alert, oriented x3, following commands appropriately.       Last Recorded Vitals  Blood pressure 147/66, pulse (!) 118, temperature 37 °C (98.6 °F), temperature source Core, resp. rate 20, height 1.549 m (5' 0.98\"), weight 92.8 kg (204 lb 9.4 oz), SpO2 100 %.  Intake/Output last 3 Shifts:  I/O last 3 completed shifts:  In: 4050.5 (44.2 mL/kg) [P.O.:500; I.V.:995.5 (10.9 mL/kg); Blood:700; NG/GT:1505; IV Piggyback:350]  Out: 5602 (61.1 mL/kg) [Urine:2010 (0.6 mL/kg/hr); Other:3592]  Weight: 91.7 kg     Relevant Results  Scheduled medications  [Held by provider] aspirin, 81 mg, oral, Daily  calcitriol, 0.5 mcg, oral, Daily  esomeprazole, 40 mg, nasogastric tube, Daily before breakfast  ferrous sulfate, 60 mg of iron, oral, Daily  insulin regular, 0-5 Units, subcutaneous, q6h  [Held by provider] insulin regular, 3 Units, subcutaneous, q6h  levothyroxine, 200 mcg, nasogastric tube, Daily  multivitamin with iron-minerals, 15 mL, oral, Daily  nystatin, 5 mL, Swish & Swallow, q6h  perflutren lipid microspheres, 0.5-10 mL of dilution, intravenous, Once in imaging  perflutren protein A microsphere, 0.5 mL, intravenous, Once in imaging  polyethylene glycol, 17 g, oral, BID  potassium, sodium phosphates, 1 packet, oral, 4x daily  predniSONE, 10 mg, oral, Daily  [Held by provider] rosuvastatin, 40 mg, oral, Nightly  sirolimus, 2 mg, oral, Daily  [Held by provider] " sulfamethoxazole-trimethoprim, 80 mg of trimethoprim, oral, Daily  sulfur hexafluoride microsphr, 2 mL, intravenous, Once in imaging  tacrolimus, 3 mg, oral, q12h TRICIA  [Held by provider] valGANciclovir, 450 mg, oral, q48h      Continuous medications  EPINEPHrine, 0.04 mcg/kg/min (Dosing Weight), Last Rate: 0.04 mcg/kg/min (02/26/24 1000)  lactated Ringer's, 5 mL/hr, Last Rate: 5 mL/hr (02/26/24 1000)  lactated Ringer's, 10 mL/hr, Last Rate: Stopped (02/20/24 1715)  PrismaSol 4/2.5, 2,400 mL/hr, Last Rate: 2,400 mL/hr (02/25/24 1453)      PRN medications  PRN medications: acetaminophen, calcium gluconate, calcium gluconate, dextrose, dextrose **OR** glucagon, glucagon, hydrALAZINE, HYDROmorphone, artificial tears, magnesium sulfate, magnesium sulfate, melatonin, oxyCODONE, oxyCODONE, oxygen, potassium chloride CR **OR** potassium chloride, potassium chloride, promethazine    Results for orders placed or performed during the hospital encounter of 02/15/24 (from the past 24 hour(s))   Transthoracic Echo (TTE) Limited   Result Value Ref Range    BSA 1.99 m2   Type and screen   Result Value Ref Range    ABO TYPE O     Rh TYPE POS     ANTIBODY SCREEN NEG    Renal function panel   Result Value Ref Range    Glucose 202 (H) 74 - 99 mg/dL    Sodium 136 136 - 145 mmol/L    Potassium 4.4 3.5 - 5.3 mmol/L    Chloride 102 98 - 107 mmol/L    Bicarbonate 24 21 - 32 mmol/L    Anion Gap 14 10 - 20 mmol/L    Urea Nitrogen 39 (H) 6 - 23 mg/dL    Creatinine 1.49 (H) 0.50 - 1.05 mg/dL    eGFR 48 (L) >60 mL/min/1.73m*2    Calcium 8.2 (L) 8.6 - 10.6 mg/dL    Phosphorus 3.9 2.5 - 4.9 mg/dL    Albumin 4.2 3.4 - 5.0 g/dL   Magnesium   Result Value Ref Range    Magnesium 2.60 (H) 1.60 - 2.40 mg/dL   CBC   Result Value Ref Range    WBC 10.5 4.4 - 11.3 x10*3/uL    nRBC 0.3 (H) 0.0 - 0.0 /100 WBCs    RBC 2.69 (L) 4.00 - 5.20 x10*6/uL    Hemoglobin 7.8 (L) 12.0 - 16.0 g/dL    Hematocrit 22.1 (L) 36.0 - 46.0 %    MCV 82 80 - 100 fL    MCH 29.0 26.0  - 34.0 pg    MCHC 35.3 32.0 - 36.0 g/dL    RDW 15.5 (H) 11.5 - 14.5 %    Platelets 61 (L) 150 - 450 x10*3/uL   Calcium, Ionized   Result Value Ref Range    POCT Calcium, Ionized 1.07 (L) 1.1 - 1.33 mmol/L   Blood Gas Arterial Full Panel   Result Value Ref Range    POCT pH, Arterial 7.42 7.38 - 7.42 pH    POCT pCO2, Arterial 37 (L) 38 - 42 mm Hg    POCT pO2, Arterial 110 (H) 85 - 95 mm Hg    POCT SO2, Arterial 99 94 - 100 %    POCT Oxy Hemoglobin, Arterial 96.7 94.0 - 98.0 %    POCT Hematocrit Calculated, Arterial 24.0 (L) 36.0 - 46.0 %    POCT Sodium, Arterial 134 (L) 136 - 145 mmol/L    POCT Potassium, Arterial 4.5 3.5 - 5.3 mmol/L    POCT Chloride, Arterial 102 98 - 107 mmol/L    POCT Ionized Calcium, Arterial 1.08 (L) 1.10 - 1.33 mmol/L    POCT Glucose, Arterial 203 (H) 74 - 99 mg/dL    POCT Lactate, Arterial 0.6 0.4 - 2.0 mmol/L    POCT Base Excess, Arterial -0.4 -2.0 - 3.0 mmol/L    POCT HCO3 Calculated, Arterial 24.0 22.0 - 26.0 mmol/L    POCT Hemoglobin, Arterial 8.0 (L) 12.0 - 16.0 g/dL    POCT Anion Gap, Arterial 13 10 - 25 mmo/L    Patient Temperature 37.0 degrees Celsius    FiO2 21 %   POCT GLUCOSE   Result Value Ref Range    POCT Glucose 191 (H) 74 - 99 mg/dL   POCT GLUCOSE   Result Value Ref Range    POCT Glucose 157 (H) 74 - 99 mg/dL   Sodium   Result Value Ref Range    Sodium 136 136 - 145 mmol/L   Lactate Dehydrogenase   Result Value Ref Range     (H) 84 - 246 U/L   Hepatic function panel   Result Value Ref Range    Albumin 4.3 3.4 - 5.0 g/dL    Bilirubin, Total 0.4 0.0 - 1.2 mg/dL    Bilirubin, Direct 0.0 0.0 - 0.3 mg/dL    Alkaline Phosphatase 44 33 - 110 U/L    ALT 85 (H) 7 - 45 U/L     (H) 9 - 39 U/L    Total Protein 6.5 6.4 - 8.2 g/dL   CBC   Result Value Ref Range    WBC 9.4 4.4 - 11.3 x10*3/uL    nRBC 0.2 (H) 0.0 - 0.0 /100 WBCs    RBC 2.72 (L) 4.00 - 5.20 x10*6/uL    Hemoglobin 7.9 (L) 12.0 - 16.0 g/dL    Hematocrit 22.9 (L) 36.0 - 46.0 %    MCV 84 80 - 100 fL    MCH 29.0 26.0 -  34.0 pg    MCHC 34.5 32.0 - 36.0 g/dL    RDW 15.7 (H) 11.5 - 14.5 %    Platelets 68 (L) 150 - 450 x10*3/uL   Calcium, Ionized   Result Value Ref Range    POCT Calcium, Ionized 1.04 (L) 1.1 - 1.33 mmol/L   Magnesium   Result Value Ref Range    Magnesium 2.46 (H) 1.60 - 2.40 mg/dL   Blood Gas Arterial Full Panel   Result Value Ref Range    POCT pH, Arterial 7.46 (H) 7.38 - 7.42 pH    POCT pCO2, Arterial 38 38 - 42 mm Hg    POCT pO2, Arterial 106 (H) 85 - 95 mm Hg    POCT SO2, Arterial 100 94 - 100 %    POCT Oxy Hemoglobin, Arterial 97.2 94.0 - 98.0 %    POCT Hematocrit Calculated, Arterial 24.0 (L) 36.0 - 46.0 %    POCT Sodium, Arterial 133 (L) 136 - 145 mmol/L    POCT Potassium, Arterial 4.5 3.5 - 5.3 mmol/L    POCT Chloride, Arterial 102 98 - 107 mmol/L    POCT Ionized Calcium, Arterial 1.02 (L) 1.10 - 1.33 mmol/L    POCT Glucose, Arterial 162 (H) 74 - 99 mg/dL    POCT Lactate, Arterial 0.6 0.4 - 2.0 mmol/L    POCT Base Excess, Arterial 3.0 -2.0 - 3.0 mmol/L    POCT HCO3 Calculated, Arterial 27.0 (H) 22.0 - 26.0 mmol/L    POCT Hemoglobin, Arterial 8.1 (L) 12.0 - 16.0 g/dL    POCT Anion Gap, Arterial 9 (L) 10 - 25 mmo/L    Patient Temperature 37.0 degrees Celsius    FiO2 21 %   Basic Metabolic Panel   Result Value Ref Range    Glucose 165 (H) 74 - 99 mg/dL    Sodium 135 (L) 136 - 145 mmol/L    Potassium 4.4 3.5 - 5.3 mmol/L    Chloride 100 98 - 107 mmol/L    Bicarbonate 28 21 - 32 mmol/L    Anion Gap 11 10 - 20 mmol/L    Urea Nitrogen 41 (H) 6 - 23 mg/dL    Creatinine 1.49 (H) 0.50 - 1.05 mg/dL    eGFR 48 (L) >60 mL/min/1.73m*2    Calcium 8.1 (L) 8.6 - 10.6 mg/dL   Phosphorus   Result Value Ref Range    Phosphorus 2.3 (L) 2.5 - 4.9 mg/dL   ECMO Arterial Blood Gas   Result Value Ref Range    POCT pH, Arterial ECMO 7.30 Reference range not established pH    POCT pCO2, Arterial ECMO 60 Reference range not established mm Hg    POCT pO2,  Arterial ECMO 443 Reference range not established mm Hg    POCT SO2, Arterial ECMO  100 Reference range not established %    POCT Oxy Hemoglobin, Arterial ECMO 97.0 Reference range not established %    POCT Base Excess, Arterial ECMO 1.6 Reference range not established mmol/L    POCT HCO3 Calculated, Arterial ECMO 29.5 Reference range not established mmol/L    Patient Temperature 37.0 degrees Celsius    FiO2 100 %   ECMO Venous Blood Gas   Result Value Ref Range    POCT pH, Venous ECMO 7.28 Reference range not established pH    POCT pCO2, Venous ECMO 62 Reference range not established mm Hg    POCT pO2,  Venous  ECMO 57 Reference range not established mm Hg    POCT SO2, Venous ECMO 90 Reference range not established %    POCT Oxy Hemoglobin, Venous ECMO 86.6 Reference range not established %    POCT Base Excess, Venous ECMO 0.8 Reference range not established mmol/L    POCT HCO3 Calculated, Venous ECMO 29.1 Reference range not established mmol/L    Patient Temperature 37.0 degrees Celsius    FiO2 100 %   POCT GLUCOSE   Result Value Ref Range    POCT Glucose 138 (H) 74 - 99 mg/dL   Prepare RBC: 4 Units   Result Value Ref Range    PRODUCT CODE F0891V60     Unit Number [id number]     Unit ABO O     Unit RH POS     XM INTEP COMP     Dispense Status IS     Blood Expiration Date March 12, 2024 23:59 EDT     PRODUCT BLOOD TYPE 5100     UNIT VOLUME 350     PRODUCT CODE O9291A48     Unit Number [id number]     Unit ABO O     Unit RH POS     XM INTEP COMP     Dispense Status XM     Blood Expiration Date March 14, 2024 23:59 EDT     PRODUCT BLOOD TYPE 5100     UNIT VOLUME 350     PRODUCT CODE A6357U01     Unit Number [id number]     Unit ABO O     Unit RH POS     XM INTEP COMP     Dispense Status XM     Blood Expiration Date March 14, 2024 23:59 EDT     PRODUCT BLOOD TYPE 5100     UNIT VOLUME 350     PRODUCT CODE X8394W63     Unit Number [id number]     Unit ABO O     Unit RH POS     XM INTEP COMP     Dispense Status XM     Blood Expiration Date March 15, 2024 23:59 EDT     PRODUCT BLOOD TYPE  5100     UNIT VOLUME 289      *Note: Due to a large number of results and/or encounters for the requested time period, some results have not been displayed. A complete set of results can be found in Results Review.         Assessment/Plan   Assessment: Ms. 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    Originally presented to [facility] ED on 2/15/24 with complaints of N/V x 3 days and inability to keep medications down. POCUS revealed acute systolic heart failure w/ severe BIV dysfunction when compared to previous images in 11/2023. Also noted to have HIRAM and transaminitis. Immunosuppression notably below therapeutic range. Admitted to HFICU and treated for suspected acute cellular vs. acute antibody mediated rejection; however, cardiac biopsy negative for signs of rejection. Despite rejection therapy, inotropes and MCS via IABP, patient's end organ function continued to worsen without improvement in cardiac function. S/p IABP 2/18/2024. Ultimately placed on left femoral VA ECMO w/ Dr. Marina on 2/19/2024 and transferred to CTICU.     CTICU course complicated by anemia requiring blood product transfusions, volume overload & intubation (tachypnea and increased work of breathing 2/2 pulmonary edema). Status post extubation on 2/24.    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  #Acute renal failure 2/2 to cardiorenal syndrome - requiring CVVH  #Acute transaminitis - gradually downtrending    Donor/Recipient Infectious history:  CMV: -/+ (last collected 2/16/24)  Toxo: -/-   Hep C:  -/-    Rejection/Prophylaxis (transplants):  - Steroids: Prednisone taper started 2/18/24 following 3xmethylprednisone pulse: Decrease daily, PO prednisone 60mg, 50mg, 40mg, 30mg, 20mg and then continue on 10mg daily   - Tacrolimus: 2.0mg PO @ 0630 & 2.0mg PO @ 1830 w/ daily levels drawn @ 0600  - Serolimus: 1.0mg PO daily w/ daily levels drawn at 0600  - Tacrolimus goal troughs: 3-5  - Serolimus goal troughs: 7-9  - Combined sirolimus/tacrolimus goal of 10-12  - Antifungals: nystatin 500,000units Q6  - Antivirals: valcyte 450mg Q48hrs --> On hold (2/23)  - Anti PCP & Toxoplasmosis: Bactrim SS daily  --> On hold (2/23)    Last cardiac biopsy: 2/16/24 with ACR1 and no AMR  Last HLA: 2/16/24 negative for DSAs   Last RHC: 2/16/24 w/ RA 11, PAP 34/14(23), W 19 and C.I. 2.3  Last LHC: 2/18/24 negative for CAV and CAD   Last TTE/BOB: 2/20/24 continue to show severe BIV dysfunction   Osteopenia/osteoporosis prophylaxis: Vitamin D3 and calcium supplements  Peptic/gastric ulcer prophylaxis: Pantoprazole 40mg daily   CAV Prophylaxis: Hold Aspirin 81mg daily while on systemic heparin. Okay to resume pravastatin    - Unclear cause of acute severe graft dysfunction. Differentials include: acute ACR vs. AMR vs. viral myocarditis vs. acute lupus myocarditis vs. Vasculitis; however, 2xallograft biopsies on 2/16 & 2/20 remain negative for any significant pathology. Notably, both biopsies taken after rejection therapies implemented which may have reduced areas of graft damage.    - DSAs remain negative; however, patient may have non-HLA antibodies present. 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   - Negative CAV & CAD via LHC on 2/18/24   - Remains on MCS via right femoral IABP set 1:1 and augmenting appropriately & left femoral VA ECMO w/ appropriate flows ~3.3L/FIO2 100%.  - Remains intubated due to pulmonary edema   - Remains on CVVH due to ARF   - LFTs gradually trending down and lactate normalized.    - Completed  methylpred steroid pulse w/ 1g Q24 x 3 days (2/16-2/18)  - Thymoglobulin doses: 2/18 & 2/19   - IVIG + PLEX Session: 2/18 & 2/20   - No further thymo, PLEX or IVIG treatment at this time with multiple biopsies negative for ACR & AMR   - Patient does have improved pulsatility with epinephrine infusion in place. Chest x-ray also shows improved lung fields with volume removal. Lastly, patient now producing UOP and displaying signs of renal recovery.    - ECMO turndown trial on 2/23 showed unchanged LVEF ~10%   - Thrombocytopenia likely multifactorial from recent thymoglobulin, MCS & CVVH. Continue to hold aspirin and agree with PF4 to rule out HIT.    Recommendations:  - Wean VA-ECMO and possible decannulation today. Plan for possible Impella 5.5 placement  - Continue Prednisone 10mg daily   - Continue Tacrolimus 3 mg BID. (goal 3-5).  - Continue Sirolimus 2 mg daily. (goal 7-9).  - Hold Valcyte and Bactrim for now.   - Follow-up CMV PCR which is currently in process  -May discontinue CVVH if urine output continues to improve    Appreciate continued CTICU care/management.    Patient was examined and discussed with [facility] attending physician, Dr. UMA Padilla.     Signed,  Nasir Piper MD  Heart Failure Fellow PGY4

## 2024-02-26 NOTE — PROGRESS NOTES
Subjective Data:  Extubated    She remains on IABP 1:1, VA-ECMO at 3.5 lpm and Epinephrine 0.04 mcg gtt. Her MAP has been stable at 85-90s with good pulsatility. She is still on CVVH.   - Wean VA-ECMO and possible decannulation today. Plan for possible Impella 5.5 placement      Objective Data:  Last Recorded Vitals:  Vitals:    02/26/24 1400 02/26/24 1500 02/26/24 1600 02/26/24 1700   BP:       Pulse: (!) 117 (!) 115 (!) 125 (!) 116   Resp: 25 24 21 23   Temp:   37 °C (98.6 °F)    TempSrc:   Core    SpO2: 100% 99% 100% 100%   Weight:       Height:           Last Labs:  CBC - 2/26/2024: 12:55 PM  11.8 8.7 71    26.5      CMP - 2/26/2024: 12:55 PM  8.7 6.5 101 --- 0.4   1.6 4.2 85 44      PTT - 2/24/2024: 11:43 PM  1.3   14.9 56     TROPHS   Date/Time Value Ref Range Status   02/16/2024 01:16  0 - 34 ng/L Final     Comment:     Previous result verified on 2/16/2024 0018 on specimen/case [id number] called with component [facility] for procedure Troponin I, High Sensitivity with value 125 ng/L.   02/15/2024 10:03  0 - 34 ng/L Final   11/10/2022 11:22  0 - 34 ng/L Final     Comment:     .  Less than 99th percentile of normal range cutoff-  Female and children under 18 years old <35 ng/L; Male <54 ng/L: Negative  Repeat testing should be performed if clinically indicated.   .  Female and children under 18 years old  ng/L; Male  ng/L:  Consistent with possible cardiac damage and possible increased clinical   risk. Serial measurements may help to assess extent of myocardial damage.   .  >120 ng/L: Consistent with cardiac damage, increased clinical risk and  myocardial infarction. Serial measurements may help assess extent of   myocardial damage.   .   NOTE: Children less than 1 year old may have higher baseline troponin   levels and results should be interpreted in conjunction with the overall   clinical context.  .  NOTE: Troponin I testing is performed using a different   testing methodology  at [facility] than at other   [facility]. Direct result comparisons should only   be made within the same method.       BNP   Date/Time Value Ref Range Status   02/16/2024 01:16 AM >5,000 0 - 99 pg/mL Final   02/15/2024 10:03 PM >5,000 0 - 99 pg/mL Final     HGBA1C   Date/Time Value Ref Range Status   02/16/2024 01:16 AM 6.3 see below % Final   03/17/2023 08:38 AM 5.7 % Final     Comment:          Diagnosis of Diabetes-Adults   Non-Diabetic: < or = 5.6%   Increased risk for developing diabetes: 5.7-6.4%   Diagnostic of diabetes: > or = 6.5%  .       Monitoring of Diabetes                Age (y)     Therapeutic Goal (%)   Adults:          >18           <7.0   Pediatrics:    13-18           <7.5                   7-12           <8.0                   0- 6            7.5-8.5   American Diabetes Association. Diabetes Care 33(S1), Jan 2010.     12/14/2022 03:41 PM 6.4 % Final     Comment:          Diagnosis of Diabetes-Adults   Non-Diabetic: < or = 5.6%   Increased risk for developing diabetes: 5.7-6.4%   Diagnostic of diabetes: > or = 6.5%  .       Monitoring of Diabetes                Age (y)     Therapeutic Goal (%)   Adults:          >18           <7.0   Pediatrics:    13-18           <7.5                   7-12           <8.0                   0- 6            7.5-8.5   American Diabetes Association. Diabetes Care 33(S1), Jan 2010.       LDLCALC   Date/Time Value Ref Range Status   02/16/2024 01:16 AM 94 <=99 mg/dL Final     Comment:                                 Near   Borderline      AGE      Desirable  Optimal    High     High     Very High     0-19 Y     0 - 109     ---    110-129   >/= 130     ----    20-24 Y     0 - 119     ---    120-159   >/= 160     ----      >24 Y     0 -  99   100-129  130-159   160-189     >/=190       VLDL   Date/Time Value Ref Range Status   02/16/2024 01:16 AM 28 0 - 40 mg/dL Final   07/18/2023 09:24 AM 32 0 - 40 mg/dL Final   03/17/2023 08:38 AM 40 0 - 40 mg/dL  Final   11/10/2022 11:22 PM 44 0 - 40 mg/dL Final      Last I/O:  I/O last 3 completed shifts:  In: 4050.5 (44.2 mL/kg) [P.O.:500; I.V.:995.5 (10.9 mL/kg); Blood:700; NG/GT:1505; IV Piggyback:350]  Out: 5602 (61.1 mL/kg) [Urine:2010 (0.6 mL/kg/hr); Other:3592]  Weight: 91.7 kg     Inpatient Medications:  Scheduled medications   Medication Dose Route Frequency    [Held by provider] aspirin  81 mg oral Daily    calcitriol  0.5 mcg oral Daily    esomeprazole  40 mg nasogastric tube Daily before breakfast    ferrous sulfate  60 mg of iron oral Daily    heparin (porcine)  5,000 Units subcutaneous q8h    insulin regular  0-5 Units subcutaneous q6h    insulin regular  3 Units subcutaneous q6h    levothyroxine  200 mcg nasogastric tube Daily    multivitamin with iron-minerals  15 mL oral Daily    nystatin  5 mL Swish & Swallow q6h    perflutren lipid microspheres  0.5-10 mL of dilution intravenous Once in imaging    perflutren protein A microsphere  0.5 mL intravenous Once in imaging    polyethylene glycol  17 g oral BID    predniSONE  10 mg oral Daily    [Held by provider] rosuvastatin  40 mg oral Nightly    sirolimus  2 mg oral Daily    [Held by provider] sulfamethoxazole-trimethoprim  80 mg of trimethoprim oral Daily    sulfur hexafluoride microsphr  2 mL intravenous Once in imaging    tacrolimus  3 mg oral q12h TRICIA    [Held by provider] valGANciclovir  450 mg oral q48h     PRN medications   Medication    acetaminophen    calcium gluconate    calcium gluconate    dextrose    dextrose    Or    glucagon    glucagon    hydrALAZINE    HYDROmorphone    artificial tears    magnesium sulfate    magnesium sulfate    melatonin    oxyCODONE    oxyCODONE    oxygen    potassium chloride CR    Or    potassium chloride    potassium chloride    promethazine     Continuous Medications   Medication Dose Last Rate    EPINEPHrine  0.02 mcg/kg/min (Dosing Weight) 0.02 mcg/kg/min (02/26/24 1700)    lactated Ringer's  5 mL/hr 5 mL/hr  (24 1700)    lactated Ringer's  10 mL/hr Stopped (24 1715)    PrismaSol 4/2.5  2,400 mL/hr 2,400 mL/hr (24 1453)     Physical Exam  Constitutional:       Appearance: She is ill-appearing.   Cardiovascular:      Rate and Rhythm: Tachycardia present.      Pulses:           Dorsalis pedis pulses are detected w/ Doppler on the right side.      Comments: IABP inflate/ deflate auscultated,  Was unable to located L DP, primary team attempting to locate with doppler, R DP faint but detectable with doppler   Pulmonary:      Comments: Intubated, mechanical breath sounds   Musculoskeletal:      Right lower le+ Edema present.      Left lower le+ Edema present.   Skin:     General: Skin is warm.      Comments: Oozing noted left femoral VA access site; right femoral site stable, minimal oozing   Neurological:      Mental Status: She is lethargic.        Assessment/Plan   Charla Bowles is a 33 year old female with past medical history of heart transplant in 2022 with postoperative course complicated by upper extremity/internal jugular DVTs, and asymptomatic 2R rejection in 2022. She was admitted to HFICU on 2/15 with concern for cardiogenic shock secondary to allograft rejection and decompensated heart failure with multiorgan dysfunction including significant elevation of liver enzymes and nonoliguric acute kidney injury. Endomyocardial biopsy on  revealed mild ACR with +CD4s and negative HLAs, however multisystem organ failure persisted with increased inotropic requirements. IABP placed on . Transferred to CTICU on  for VA ECMO cannulation with Dr. Marina for persistent multisystem dysfunction.     : IABP placed in cath lab, [facility] showed right dominant coronary circulation with no significant coronary artery disease, LVEDP 20    : multi-disciplinary review convened; given persistent requirement for pharmacological and mechanical support, and with the addition of  oliguric/anuric renal insufficiency, decision made to transfer to CTICU and initiate VA ECMO    2/20: pending endomyocardial biopsy today for further investigation of allograft dysfunction    2/21-22: on VA ecmo, with IABP, CRRT, intubated and on vasopressors    2/23: ECMO wean down trial; considering extubation, considering transition to Impella 5.5     2/26 - Wean VA-ECMO and possible decannulation today. Plan for possible Impella 5.5 placement     Cardiogenic shock 2/2 suspected allograft rejection  S/p OHT 3/2022  - Intra- aortic balloon pump placed on 2/18, VA ECMO cannulation 2/19  - CXR placement satisfactory assess   - Current IABP settings: 1:1, augmentation 111, assisted BP 95/72 (85) site with no hematoma/bleeding   - Plan to wean as hemodynamics per primary team     Recommendations:  - daily CXR with IABP  - please maintain strict bedrest while IABP in place  - closely monitor groin insertion site and distal pulses  - May elevate HOB no greater than 30 degrees  - May log roll patient side to side without flexing involved extremity  - Interventional cardiology will continue to follow, will defer primary care to CICU team     Full Code  Discussed with HF team and ICU     JIMMY Holland-CNP

## 2024-02-26 NOTE — PROGRESS NOTES
Vascular Surgery Progress Note;    Charla Bowles  1990  [id number]    Reason for admission:  Charla Bowles is a 33 y.o. female on day 11 of admission presenting with Cardiogenic shock (CMS/HCC).    Subjective   No events overnight  Remains extubated.  No pain, numbness, tingling, weakness to lower extremities.  No other concerns.    Objective   Last Recorded Vitals  Heart Rate:  [106-142]   Temp:  [36.7 °C (98.1 °F)-37.3 °C (99.1 °F)]   Resp:  [13-32]   BP: (123)/(71)   SpO2:  [94 %-100 %]     Intake/Output last 3 Shifts:  I/O last 3 completed shifts:  In: 4050.5 (44.2 mL/kg) [P.O.:500; I.V.:995.5 (10.9 mL/kg); Blood:700; NG/GT:1505; IV Piggyback:350]  Out: 5602 (61.1 mL/kg) [Urine:2010 (0.6 mL/kg/hr); Other:3592]  Weight: 91.7 kg     AA female, lying in bed.   Sinus tachy on monitor  Satting well on ECMO and NC  Abdomen soft, nontender  R groin with IABP in place, L groin with venous and arterial cannulae in place. Bilateral LE warm from thigh to knees.  Palpable RLE pulses, multiphasic LLE PT. Palpable R DP and PT. Motor and sensory intact to lower extremities.    Relevant Results  Lab Results   Component Value Date    WBC 9.4 02/26/2024    HGB 7.9 (L) 02/26/2024    HCT 22.9 (L) 02/26/2024    MCV 84 02/26/2024    PLT 68 (L) 02/26/2024     Lab Results   Component Value Date    GLUCOSE 165 (H) 02/26/2024    CALCIUM 8.1 (L) 02/26/2024     (L) 02/26/2024    K 4.4 02/26/2024    CO2 28 02/26/2024     02/26/2024    BUN 41 (H) 02/26/2024    CREATININE 1.49 (H) 02/26/2024       Active Medications  Scheduled medications  [Held by provider] aspirin, 81 mg, oral, Daily  calcitriol, 0.5 mcg, oral, Daily  esomeprazole, 40 mg, nasogastric tube, Daily before breakfast  ferrous sulfate, 60 mg of iron, oral, Daily  insulin regular, 0-5 Units, subcutaneous, q6h  insulin regular, 3 Units, subcutaneous, q6h  levothyroxine, 200 mcg, nasogastric tube, Daily  multivitamin with iron-minerals, 15 mL, oral,  Daily  nystatin, 5 mL, Swish & Swallow, q6h  perflutren lipid microspheres, 0.5-10 mL of dilution, intravenous, Once in imaging  perflutren lipid microspheres, 0.5-10 mL of dilution, intravenous, Once in imaging  perflutren lipid microspheres, 0.5-10 mL of dilution, intravenous, Once in imaging  perflutren protein A microsphere, 0.5 mL, intravenous, Once in imaging  perflutren protein A microsphere, 0.5 mL, intravenous, Once in imaging  [Held by provider] polyethylene glycol, 17 g, oral, BID  predniSONE, 10 mg, oral, Daily  [Held by provider] rosuvastatin, 40 mg, oral, Nightly  sirolimus, 2 mg, oral, Daily  [Held by provider] sulfamethoxazole-trimethoprim, 80 mg of trimethoprim, oral, Daily  sulfur hexafluoride microsphr, 2 mL, intravenous, Once in imaging  sulfur hexafluoride microsphr, 2 mL, intravenous, Once in imaging  sulfur hexafluoride microsphr, 2 mL, intravenous, Once in imaging  tacrolimus, 3 mg, oral, q12h TRICIA  [Held by provider] valGANciclovir, 450 mg, oral, q48h      Continuous medications  [Held by provider] bivalirudin, 0-0.15 mg/kg/hr, Last Rate: Stopped (02/25/24 0800)  EPINEPHrine, 0.04 mcg/kg/min (Dosing Weight), Last Rate: 0.04 mcg/kg/min (02/26/24 0700)  lactated Ringer's, 5 mL/hr, Last Rate: 5 mL/hr (02/26/24 0700)  lactated Ringer's, 10 mL/hr, Last Rate: Stopped (02/20/24 1715)  PrismaSol 4/2.5, 2,400 mL/hr, Last Rate: 2,400 mL/hr (02/25/24 1453)      PRN medications  PRN medications: acetaminophen, calcium gluconate, calcium gluconate, dextrose, dextrose **OR** glucagon, glucagon, HYDROmorphone, artificial tears, magnesium sulfate, magnesium sulfate, melatonin, oxyCODONE, oxyCODONE, oxygen, potassium chloride CR **OR** potassium chloride, potassium chloride, promethazine     Assessment/Plan   Principal Problem:    Cardiogenic shock (CMS/HCC)  Active Problems:    Heart transplant recipient (CMS/HCC)    Encounter for aftercare following heart transplant (CMS/[facility])    Heart transplant as cause of  abnormal reaction or later complication (CMS/HCC)    33F PMHx OHT March 2022 admitted with cardiogenic shock related to allograft rejection. Currently on ECMO and Impella support (ECMO VV L groin, R CFA IABP) placed 2/19. 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 LLE not frankly cold, however cool and without detectable signals.    2/23: Roughly stable exam. Coolness/temperature gradient stable, difficulty getting L pop signal. Still remains on full MCS   2/25: coolness from mid-shin to toes on LLE, but otherwise temperature gradient  on BLE above this level is similar.   2/26: Maintains good signals to LLE. Motor and sensory intact to bilateral lower extremities. LLE is warm. Denying any numbness, tingling, weakness to LLE.    Plan:  - No acute surgical intervention required from vascular.    Vascular surgery will sign off at this time.    Please call with any questions or concerns.    Discussed with attending.    Raghavendra Ascencio MD  Vascular Surgery, PGY3  Team Pager: [phone]

## 2024-02-26 NOTE — CARE PLAN
The patient's goals for the shift include  pain controlled at acceptable level, patient remains safe throughout shift, and patient tolerates weaning of cardiac devices.    The clinical goals for the shift include Patient will remain hemodynamically stable, have pain controlled at acceptable level.     Patient remains hemodynamically stable on epi at 0.02mcg/kg/min, IABP remains 1:1 and ECMO flows are 3.1-3.4. Patients pain controlled with medications and non medicine interventions. Plan to continue to monitor hemodynamics and wean as tolerated. Plan to continue to assess pain and treat as needed.    Problem: Pain  Goal: Walks with improved pain control throughout the shift  Outcome: Not Progressing  Goal: Performs ADL's with improved pain control throughout shift  Outcome: Not Progressing  Goal: Participates in PT with improved pain control throughout the shift  Outcome: Not Progressing     Problem: Discharge Planning  Goal: Discharge to home or other facility with appropriate resources  Outcome: Not Progressing     Problem: Chronic Conditions and Co-morbidities  Goal: Patient's chronic conditions and co-morbidity symptoms are monitored and maintained or improved  Outcome: Not Progressing
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
## 2024-02-26 NOTE — PROGRESS NOTES
"Charla Bowles is a 33 y.o. female on day 11 of admission presenting with Cardiogenic shock (CMS/HCC).    ECMO NOTE:    Patient requires ECMO support. Drainage cannula site, cannula, pump, oxygenator, return cannula and return cannula site clinically inspected. RPM and sweep reviewed and adjusted appropriate to clinical context. Anticoagulation status and intensity discussed. Plan for weaning, next clinical steps discussed with team and family. Discussed with cardiothoracic surgeon, perfusion, palliative care and physical/occupational therapy    ECMO Indication: CS  ECMO Cannula Configuration: femfem  ECMO circuit run from drainage cannula to pump to oxygenator to return cannula: y  Presence of cannula bleeding: n  Presence of oxygenator clot: n  Pre/post PaO2 adequate: y  LV Unloading/Pulse Pressure: iabp, epi  Distal Perfusion: ok  Anticoagulation Plan/Monitoring: held for anemia, possible OR  Mobility Plan/Candidacy: no, OR  Path to decannulation/anticipated decannulation date:potentially 2/26          Objective     Physical Exam    Last Recorded Vitals  Blood pressure 147/66, pulse (!) 118, temperature 37 °C (98.6 °F), temperature source Core, resp. rate 20, height 1.549 m (5' 0.98\"), weight 92.8 kg (204 lb 9.4 oz), SpO2 100 %.  Intake/Output last 3 Shifts:  I/O last 3 completed shifts:  In: 4050.5 (44.2 mL/kg) [P.O.:500; I.V.:995.5 (10.9 mL/kg); Blood:700; NG/GT:1505; IV Piggyback:350]  Out: 5602 (61.1 mL/kg) [Urine:2010 (0.6 mL/kg/hr); Other:3592]  Weight: 91.7 kg         Assessment/Plan   Principal Problem:    Cardiogenic shock (CMS/HCC)  Active Problems:    Heart transplant recipient (CMS/HCC)    Encounter for aftercare following heart transplant (CMS/[facility])    Heart transplant as cause of abnormal reaction or later complication (CMS/HCC)    I, as the attending critical care physician, have personally reviewed the recent patient history, physical exam, vital signs, significant labs, medications, " imaging, and ECMO settings, oxygenator, and flows of this critically ill patient. Using this information I have and will continue to actively manage this critically ill patient's extracorporeal membrane oxygenation (ECMO)/extracorporeal life support (ECLS).     Quang Mehta MD

## 2024-02-26 NOTE — PROGRESS NOTES
Physical Therapy                 Therapy Communication Note    Patient Name: Charla Bowles  MRN: [id number]  Today's Date: 2/26/2024     Discipline: Physical Therapy    Missed Visit Reason: Missed Visit Reason:  (Per RN, pts going to cath lab today for potential impella 5:5.  Pt with fem IABP and ECMO at this time.  Will hold PT this date and re-attempt once medically appropriate.)    Missed Time: Attempt       Declines

## 2024-02-26 NOTE — PROGRESS NOTES
"Charla Bowles is a 33 y.o. female on day 11 of admission presenting with Cardiogenic shock (CMS/HCC).    33 year old female with past medical history of heart transplant in March 2022 admitted 2/15 with biopsy on 2/16 showing mild ACR.  Sequential escalation of support to IABP (2/18) and VA-ECMO (2/19) and concurrent treatment for rejection. Intubated on 2/21 due to pulmonary edema with subsequent extubation on 2/24.       Objective     Physical Exam    Last Recorded Vitals  Blood pressure 134/69, pulse (!) 126, temperature 37 °C (98.6 °F), temperature source Core, resp. rate 25, height 1.549 m (5' 0.98\"), weight 92.8 kg (204 lb 9.4 oz), SpO2 100 %.  Intake/Output last 3 Shifts:  I/O last 3 completed shifts:  In: 4050.5 (44.2 mL/kg) [P.O.:500; I.V.:995.5 (10.9 mL/kg); Blood:700; NG/GT:1505; IV Piggyback:350]  Out: 5602 (61.1 mL/kg) [Urine:2010 (0.6 mL/kg/hr); Other:3592]  Weight: 91.7 kg     Relevant Results  Results for orders placed or performed during the hospital encounter of 02/15/24 (from the past 24 hour(s))   Transthoracic Echo (TTE) Limited   Result Value Ref Range    BSA 1.99 m2   Type and screen   Result Value Ref Range    ABO TYPE O     Rh TYPE POS     ANTIBODY SCREEN NEG    Renal function panel   Result Value Ref Range    Glucose 202 (H) 74 - 99 mg/dL    Sodium 136 136 - 145 mmol/L    Potassium 4.4 3.5 - 5.3 mmol/L    Chloride 102 98 - 107 mmol/L    Bicarbonate 24 21 - 32 mmol/L    Anion Gap 14 10 - 20 mmol/L    Urea Nitrogen 39 (H) 6 - 23 mg/dL    Creatinine 1.49 (H) 0.50 - 1.05 mg/dL    eGFR 48 (L) >60 mL/min/1.73m*2    Calcium 8.2 (L) 8.6 - 10.6 mg/dL    Phosphorus 3.9 2.5 - 4.9 mg/dL    Albumin 4.2 3.4 - 5.0 g/dL   Magnesium   Result Value Ref Range    Magnesium 2.60 (H) 1.60 - 2.40 mg/dL   CBC   Result Value Ref Range    WBC 10.5 4.4 - 11.3 x10*3/uL    nRBC 0.3 (H) 0.0 - 0.0 /100 WBCs    RBC 2.69 (L) 4.00 - 5.20 x10*6/uL    Hemoglobin 7.8 (L) 12.0 - 16.0 g/dL    Hematocrit 22.1 (L) 36.0 - " 46.0 %    MCV 82 80 - 100 fL    MCH 29.0 26.0 - 34.0 pg    MCHC 35.3 32.0 - 36.0 g/dL    RDW 15.5 (H) 11.5 - 14.5 %    Platelets 61 (L) 150 - 450 x10*3/uL   Calcium, Ionized   Result Value Ref Range    POCT Calcium, Ionized 1.07 (L) 1.1 - 1.33 mmol/L   Blood Gas Arterial Full Panel   Result Value Ref Range    POCT pH, Arterial 7.42 7.38 - 7.42 pH    POCT pCO2, Arterial 37 (L) 38 - 42 mm Hg    POCT pO2, Arterial 110 (H) 85 - 95 mm Hg    POCT SO2, Arterial 99 94 - 100 %    POCT Oxy Hemoglobin, Arterial 96.7 94.0 - 98.0 %    POCT Hematocrit Calculated, Arterial 24.0 (L) 36.0 - 46.0 %    POCT Sodium, Arterial 134 (L) 136 - 145 mmol/L    POCT Potassium, Arterial 4.5 3.5 - 5.3 mmol/L    POCT Chloride, Arterial 102 98 - 107 mmol/L    POCT Ionized Calcium, Arterial 1.08 (L) 1.10 - 1.33 mmol/L    POCT Glucose, Arterial 203 (H) 74 - 99 mg/dL    POCT Lactate, Arterial 0.6 0.4 - 2.0 mmol/L    POCT Base Excess, Arterial -0.4 -2.0 - 3.0 mmol/L    POCT HCO3 Calculated, Arterial 24.0 22.0 - 26.0 mmol/L    POCT Hemoglobin, Arterial 8.0 (L) 12.0 - 16.0 g/dL    POCT Anion Gap, Arterial 13 10 - 25 mmo/L    Patient Temperature 37.0 degrees Celsius    FiO2 21 %   POCT GLUCOSE   Result Value Ref Range    POCT Glucose 191 (H) 74 - 99 mg/dL   POCT GLUCOSE   Result Value Ref Range    POCT Glucose 157 (H) 74 - 99 mg/dL   Sodium   Result Value Ref Range    Sodium 136 136 - 145 mmol/L   Lactate Dehydrogenase   Result Value Ref Range     (H) 84 - 246 U/L   Hepatic function panel   Result Value Ref Range    Albumin 4.3 3.4 - 5.0 g/dL    Bilirubin, Total 0.4 0.0 - 1.2 mg/dL    Bilirubin, Direct 0.0 0.0 - 0.3 mg/dL    Alkaline Phosphatase 44 33 - 110 U/L    ALT 85 (H) 7 - 45 U/L     (H) 9 - 39 U/L    Total Protein 6.5 6.4 - 8.2 g/dL   CBC   Result Value Ref Range    WBC 9.4 4.4 - 11.3 x10*3/uL    nRBC 0.2 (H) 0.0 - 0.0 /100 WBCs    RBC 2.72 (L) 4.00 - 5.20 x10*6/uL    Hemoglobin 7.9 (L) 12.0 - 16.0 g/dL    Hematocrit 22.9 (L) 36.0 -  46.0 %    MCV 84 80 - 100 fL    MCH 29.0 26.0 - 34.0 pg    MCHC 34.5 32.0 - 36.0 g/dL    RDW 15.7 (H) 11.5 - 14.5 %    Platelets 68 (L) 150 - 450 x10*3/uL   Calcium, Ionized   Result Value Ref Range    POCT Calcium, Ionized 1.04 (L) 1.1 - 1.33 mmol/L   Magnesium   Result Value Ref Range    Magnesium 2.46 (H) 1.60 - 2.40 mg/dL   Blood Gas Arterial Full Panel   Result Value Ref Range    POCT pH, Arterial 7.46 (H) 7.38 - 7.42 pH    POCT pCO2, Arterial 38 38 - 42 mm Hg    POCT pO2, Arterial 106 (H) 85 - 95 mm Hg    POCT SO2, Arterial 100 94 - 100 %    POCT Oxy Hemoglobin, Arterial 97.2 94.0 - 98.0 %    POCT Hematocrit Calculated, Arterial 24.0 (L) 36.0 - 46.0 %    POCT Sodium, Arterial 133 (L) 136 - 145 mmol/L    POCT Potassium, Arterial 4.5 3.5 - 5.3 mmol/L    POCT Chloride, Arterial 102 98 - 107 mmol/L    POCT Ionized Calcium, Arterial 1.02 (L) 1.10 - 1.33 mmol/L    POCT Glucose, Arterial 162 (H) 74 - 99 mg/dL    POCT Lactate, Arterial 0.6 0.4 - 2.0 mmol/L    POCT Base Excess, Arterial 3.0 -2.0 - 3.0 mmol/L    POCT HCO3 Calculated, Arterial 27.0 (H) 22.0 - 26.0 mmol/L    POCT Hemoglobin, Arterial 8.1 (L) 12.0 - 16.0 g/dL    POCT Anion Gap, Arterial 9 (L) 10 - 25 mmo/L    Patient Temperature 37.0 degrees Celsius    FiO2 21 %   Basic Metabolic Panel   Result Value Ref Range    Glucose 165 (H) 74 - 99 mg/dL    Sodium 135 (L) 136 - 145 mmol/L    Potassium 4.4 3.5 - 5.3 mmol/L    Chloride 100 98 - 107 mmol/L    Bicarbonate 28 21 - 32 mmol/L    Anion Gap 11 10 - 20 mmol/L    Urea Nitrogen 41 (H) 6 - 23 mg/dL    Creatinine 1.49 (H) 0.50 - 1.05 mg/dL    eGFR 48 (L) >60 mL/min/1.73m*2    Calcium 8.1 (L) 8.6 - 10.6 mg/dL   Phosphorus   Result Value Ref Range    Phosphorus 2.3 (L) 2.5 - 4.9 mg/dL   Tacrolimus level   Result Value Ref Range    Tacrolimus  2.1 <=15.0 ng/mL   ECMO Arterial Blood Gas   Result Value Ref Range    POCT pH, Arterial ECMO 7.30 Reference range not established pH    POCT pCO2, Arterial ECMO 60 Reference  range not established mm Hg    POCT pO2,  Arterial ECMO 443 Reference range not established mm Hg    POCT SO2, Arterial ECMO 100 Reference range not established %    POCT Oxy Hemoglobin, Arterial ECMO 97.0 Reference range not established %    POCT Base Excess, Arterial ECMO 1.6 Reference range not established mmol/L    POCT HCO3 Calculated, Arterial ECMO 29.5 Reference range not established mmol/L    Patient Temperature 37.0 degrees Celsius    FiO2 100 %   ECMO Venous Blood Gas   Result Value Ref Range    POCT pH, Venous ECMO 7.28 Reference range not established pH    POCT pCO2, Venous ECMO 62 Reference range not established mm Hg    POCT pO2,  Venous  ECMO 57 Reference range not established mm Hg    POCT SO2, Venous ECMO 90 Reference range not established %    POCT Oxy Hemoglobin, Venous ECMO 86.6 Reference range not established %    POCT Base Excess, Venous ECMO 0.8 Reference range not established mmol/L    POCT HCO3 Calculated, Venous ECMO 29.1 Reference range not established mmol/L    Patient Temperature 37.0 degrees Celsius    FiO2 100 %   POCT GLUCOSE   Result Value Ref Range    POCT Glucose 138 (H) 74 - 99 mg/dL   Prepare RBC: 4 Units   Result Value Ref Range    PRODUCT CODE K4824D96     Unit Number [id number]     Unit ABO O     Unit RH POS     XM INTEP COMP     Dispense Status IS     Blood Expiration Date March 12, 2024 23:59 EDT     PRODUCT BLOOD TYPE 5100     UNIT VOLUME 350     PRODUCT CODE [id number]     Unit Number [id number]     Unit ABO O     Unit RH POS     XM INTEP COMP     Dispense Status XM     Blood Expiration Date March 14, 2024 23:59 EDT     PRODUCT BLOOD TYPE 5100     UNIT VOLUME 350     PRODUCT CODE [id number]     Unit Number [id number]     Unit ABO O     Unit RH POS     XM INTEP COMP     Dispense Status XM     Blood Expiration Date March 14, 2024 23:59 EDT     PRODUCT BLOOD TYPE 5100     UNIT VOLUME 350     PRODUCT CODE [id number]     Unit Number [id number]     Unit ABO O      Unit RH POS     XM INTEP COMP     Dispense Status XM     Blood Expiration Date March 15, 2024 23:59 EDT     PRODUCT BLOOD TYPE 5100     UNIT VOLUME 289      *Note: Due to a large number of results and/or encounters for the requested time period, some results have not been displayed. A complete set of results can be found in Results Review.      This patient has a central line   Reason for the central line remaining today? Dialysis/Hemapheresis and Hemodynamic monitoring    This patient has a urinary catheter   Reason for the urinary catheter remaining today? critically ill patient who need accurate urinary output measurements               Assessment/Plan   Principal Problem:    Cardiogenic shock (CMS/[facility])  Active Problems:    Heart transplant recipient (CMS/[facility])    Encounter for aftercare following heart transplant (CMS/[facility])    Heart transplant as cause of abnormal reaction or later complication (CMS/[facility])    1) Cardiogenic Shock              [facility]-ECMO              Low dose epinephrine  Fluid pull - via furosemide/CRRT as needed. CVP single digits  Reviewed with Neph/HF.  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 Bronchopulmonary hygiene in place.  5) Acute Thrombocytopenia              [id number]              Holding bival this morning due to anemia  6) LLE decreased pulse              Improved, standard monitoring   7) Hx of gastric bypass, colitis  8) Hyponatremia              improved  9) Acute blood loss anemia              Transfuse and monitor response.  10) Nutrition              Tube feeds  11) Disp              CTICU. Maintain lines. Discussed with Heart Tx. Reviewing echo and discussing plan for possible Impella 5.5 ->NPO for today with products on hold for O.R. Palliative medicine consulted.     Due to the high  probability of life threatening clinical decompensation, the patient required critical care time evaluating and managing this patient.  Critical care time included obtaining a history, examining the patient, ordering and reviewing studies, discussing, developing, and implementing a management plan, evaluating the patient's response to treatment, and discussion with other care team providers. I saw and evaluated the patient myself. I reviewed the provider's documentation and discussed the patient with the provider. Critical care time was performed exclusive of billable procedures.    Critical Care Time: 40 minutes     Quang Mehta MD

## 2024-02-27 NOTE — PROGRESS NOTES
HFAttending Note    Ms. 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     Originally presented to [facility] ED on 2/15/24 with complaints of N/V x 3 days and inability to keep medications down. POCUS revealed acute systolic heart failure w/ severe BIV dysfunction when compared to previous images in 11/2023. Also noted to have HIRAM and transaminitis. Immunosuppression notably below therapeutic range. Admitted to HFICU and treated for suspected acute cellular vs. acute antibody mediated rejection; however, cardiac biopsy negative for signs of rejection. Despite rejection therapy, inotropes and MCS via IABP, patient's end organ function continued to worsen without improvement in cardiac function. S/p IABP 2/18/2024. Ultimately placed on left femoral [facility] ECMO w/ Dr. Marina on 2/19/2024 and transferred to CTICU.      CTICU course complicated by anemia requiring blood product transfusions, volume overload & intubation (tachypnea and increased work of breathing 2/2 pulmonary edema). Status post extubation on 2/24.      This critically ill patient continues to be at-risk for clinically significant deterioration / failure due to the above mentioned dysfunctional, unstable organ systems.  I have personally identified and managed all complex critical care issues to prevent aforementioned clinical deterioration.  Critical care time is spent at bedside and/or the immediate area and has included, but is not limited to, the review of diagnostic tests, labs, radiographs, serial assessments of hemodynamics, respiratory status, ventilatory management, and family updates.  Time spent in procedures and teaching are  reported separately.    Critical care time: __35__ minutes

## 2024-02-27 NOTE — PROGRESS NOTES
[facility]  ADVANCED HEART FAILURE AND TRANSPLANT CARDIOLOGY     Charla Bowles is a 33 y.o. female on day 9 of admission presenting with Cardiogenic shock (CMS/HCC).    INTERVAL EVENTS / PERTINENT ROS:    Patient started on cleviprex for afterload reduction. No other acute events overnight.     Objective     Physical Exam  Constitutional:       General: She is not in acute distress.     Appearance: She is obese.   HENT:      Head: Normocephalic.      Comments: +[place] catheter on right internal jugular, Trialysis line on left IJ     Mouth/Throat:      Mouth: Mucous membranes are moist.   Eyes:      Pupils: Pupils are equal, round, and reactive to light.   Cardiovascular:      Rate and Rhythm: Regular rhythm. Tachycardia present.      Pulses: Normal pulses.      Heart sounds: Normal heart sounds. No murmur heard.     No friction rub. No gallop.      Comments: Left femoral VA ECMO flowing ~3.5L/ FIO2 100%/sweep 0.5L/m. Right femoral IABP in place set 1:1 and augmenting appropriately.   Pulmonary:      Effort: Pulmonary effort is normal. No accessory muscle usage or retractions.      Breath sounds: No wheezing, rhonchi or rales.      Comments: Breathing comfortable on nasal cannula. Equal chest rise bilaterally.   Abdominal:      General: Abdomen is flat. Bowel sounds are normal. There is no distension.      Palpations: Abdomen is soft. There is no mass.      Tenderness: There is no abdominal tenderness. There is no guarding.      Hernia: No hernia is present.   Musculoskeletal:         General: Swelling: Generalized +1-2 edema.      Cervical back: Full passive range of motion without pain.   Skin:     General: Skin is warm and dry.      Capillary Refill: Capillary refill takes less than 2 seconds.      Coloration: Skin is not jaundiced.      Findings: No laceration, rash or wound.   Neurological:      General: No focal deficit present.      Mental Status: She is alert.       "Comments: Awake, alert, oriented x3, following commands appropriately.   Psychiatric:         Mood and Affect: Mood normal.         Behavior: Behavior normal.       Last Recorded Vitals  Blood pressure 142/63, pulse (!) 137, temperature 37.2 °C (99 °F), temperature source Core, resp. rate 18, height 1.549 m (5' 0.98\"), weight 91.3 kg (201 lb 4.5 oz), SpO2 100 %.  Intake/Output last 3 Shifts:  I/O last 3 completed shifts:  In: 1459.7 (15.7 mL/kg) [I.V.:599.7 (6.5 mL/kg); Blood:350; NG/GT:460; IV Piggyback:50]  Out: 4387 (47.3 mL/kg) [Urine:2330 (0.7 mL/kg/hr); Other:2057]  Weight: 92.8 kg     Relevant Results  Scheduled medications  [Held by provider] aspirin, 81 mg, oral, Daily  calcitriol, 0.5 mcg, oral, Daily  ferrous sulfate (325 mg ferrous sulfate), 65 mg of iron, oral, Daily with breakfast  heparin (porcine), 5,000 Units, subcutaneous, q8h  insulin regular, 0-5 Units, subcutaneous, q6h  insulin regular, 3 Units, subcutaneous, q6h  [START ON 2/28/2024] levothyroxine, 200 mcg, oral, Daily  lidocaine, 1 patch, transdermal, Daily  multivitamin with minerals, 1 tablet, oral, Daily  nystatin, 5 mL, Swish & Swallow, q6h  [START ON 2/28/2024] pantoprazole, 40 mg, oral, Daily before breakfast  perflutren lipid microspheres, 0.5-10 mL of dilution, intravenous, Once in imaging  perflutren protein A microsphere, 0.5 mL, intravenous, Once in imaging  polyethylene glycol, 17 g, oral, BID  potassium phosphate, 15 mmol, intravenous, Once  predniSONE, 10 mg, oral, Daily  rosuvastatin, 40 mg, oral, Nightly  [START ON 2/28/2024] sirolimus, 2 mg, oral, Daily  [Held by provider] sulfamethoxazole-trimethoprim, 80 mg of trimethoprim, oral, Daily  sulfur hexafluoride microsphr, 2 mL, intravenous, Once in imaging  tacrolimus, 3 mg, oral, q12h TRICIA  [Held by provider] valGANciclovir, 450 mg, oral, q48h      Continuous medications  clevidipine, 1-16 mg/hr, Last Rate: 8 mg/hr (02/27/24 0900)  EPINEPHrine, 0.02 mcg/kg/min (Dosing Weight), " Last Rate: 0.02 mcg/kg/min (02/27/24 0900)  lactated Ringer's, 5 mL/hr, Last Rate: 5 mL/hr (02/27/24 0900)  lactated Ringer's, 10 mL/hr, Last Rate: Stopped (02/20/24 1715)      PRN medications  PRN medications: acetaminophen, calcium gluconate, calcium gluconate, dextrose, dextrose **OR** glucagon, glucagon, hydrALAZINE, HYDROmorphone, artificial tears, magnesium sulfate, magnesium sulfate, melatonin, oxyCODONE, oxyCODONE, oxymetazoline, potassium chloride, potassium chloride CR **OR** [DISCONTINUED] potassium chloride, promethazine    Results for orders placed or performed during the hospital encounter of 02/15/24 (from the past 24 hour(s))   POCT GLUCOSE   Result Value Ref Range    POCT Glucose 196 (H) 74 - 99 mg/dL   Blood Gas Arterial Full Panel   Result Value Ref Range    POCT pH, Arterial 7.52 (H) 7.38 - 7.42 pH    POCT pCO2, Arterial 30 (L) 38 - 42 mm Hg    POCT pO2, Arterial 119 (H) 85 - 95 mm Hg    POCT SO2, Arterial 100 94 - 100 %    POCT Oxy Hemoglobin, Arterial 96.8 94.0 - 98.0 %    POCT Hematocrit Calculated, Arterial 28.0 (L) 36.0 - 46.0 %    POCT Sodium, Arterial 133 (L) 136 - 145 mmol/L    POCT Potassium, Arterial 4.8 3.5 - 5.3 mmol/L    POCT Chloride, Arterial 101 98 - 107 mmol/L    POCT Ionized Calcium, Arterial 1.12 1.10 - 1.33 mmol/L    POCT Glucose, Arterial 175 (H) 74 - 99 mg/dL    POCT Lactate, Arterial 0.7 0.4 - 2.0 mmol/L    POCT Base Excess, Arterial 1.9 -2.0 - 3.0 mmol/L    POCT HCO3 Calculated, Arterial 24.5 22.0 - 26.0 mmol/L    POCT Hemoglobin, Arterial 9.2 (L) 12.0 - 16.0 g/dL    POCT Anion Gap, Arterial 12 10 - 25 mmo/L    Patient Temperature 37.0 degrees Celsius    FiO2 21 %   Calcium, Ionized   Result Value Ref Range    POCT Calcium, Ionized 1.06 (L) 1.1 - 1.33 mmol/L   CBC   Result Value Ref Range    WBC 11.8 (H) 4.4 - 11.3 x10*3/uL    nRBC 0.2 (H) 0.0 - 0.0 /100 WBCs    RBC 3.00 (L) 4.00 - 5.20 x10*6/uL    Hemoglobin 8.7 (L) 12.0 - 16.0 g/dL    Hematocrit 26.5 (L) 36.0 - 46.0 %     MCV 88 80 - 100 fL    MCH 29.0 26.0 - 34.0 pg    MCHC 32.8 32.0 - 36.0 g/dL    RDW 15.7 (H) 11.5 - 14.5 %    Platelets 71 (L) 150 - 450 x10*3/uL   Magnesium   Result Value Ref Range    Magnesium 2.41 (H) 1.60 - 2.40 mg/dL   Renal function panel   Result Value Ref Range    Glucose 171 (H) 74 - 99 mg/dL    Sodium 136 136 - 145 mmol/L    Potassium 4.7 3.5 - 5.3 mmol/L    Chloride 101 98 - 107 mmol/L    Bicarbonate 26 21 - 32 mmol/L    Anion Gap 14 10 - 20 mmol/L    Urea Nitrogen 31 (H) 6 - 23 mg/dL    Creatinine 1.40 (H) 0.50 - 1.05 mg/dL    eGFR 51 (L) >60 mL/min/1.73m*2    Calcium 8.7 8.6 - 10.6 mg/dL    Phosphorus 1.6 (L) 2.5 - 4.9 mg/dL    Albumin 4.2 3.4 - 5.0 g/dL   Blood Gas Arterial Full Panel   Result Value Ref Range    POCT pH, Arterial 7.56 (H) 7.38 - 7.42 pH    POCT pCO2, Arterial 32 (L) 38 - 42 mm Hg    POCT pO2, Arterial 103 (H) 85 - 95 mm Hg    POCT SO2, Arterial 99 94 - 100 %    POCT Oxy Hemoglobin, Arterial 96.3 94.0 - 98.0 %    POCT Hematocrit Calculated, Arterial 28.0 (L) 36.0 - 46.0 %    POCT Sodium, Arterial 134 (L) 136 - 145 mmol/L    POCT Potassium, Arterial 5.0 3.5 - 5.3 mmol/L    POCT Chloride, Arterial 102 98 - 107 mmol/L    POCT Ionized Calcium, Arterial 1.21 1.10 - 1.33 mmol/L    POCT Glucose, Arterial 140 (H) 74 - 99 mg/dL    POCT Lactate, Arterial 0.6 0.4 - 2.0 mmol/L    POCT Base Excess, Arterial 6.3 (H) -2.0 - 3.0 mmol/L    POCT HCO3 Calculated, Arterial 28.7 (H) 22.0 - 26.0 mmol/L    POCT Hemoglobin, Arterial 9.4 (L) 12.0 - 16.0 g/dL    POCT Anion Gap, Arterial 8 (L) 10 - 25 mmo/L    Patient Temperature 37.0 degrees Celsius    FiO2 21 %   Lactate Dehydrogenase   Result Value Ref Range     (H) 84 - 246 U/L   Hepatic function panel   Result Value Ref Range    Albumin 3.9 3.4 - 5.0 g/dL    Bilirubin, Total 0.6 0.0 - 1.2 mg/dL    Bilirubin, Direct 0.1 0.0 - 0.3 mg/dL    Alkaline Phosphatase 47 33 - 110 U/L     (H) 7 - 45 U/L     (H) 9 - 39 U/L    Total Protein 6.1 (L)  6.4 - 8.2 g/dL   Calcium, Ionized   Result Value Ref Range    POCT Calcium, Ionized 1.13 1.1 - 1.33 mmol/L   CBC   Result Value Ref Range    WBC 9.9 4.4 - 11.3 x10*3/uL    nRBC 0.0 0.0 - 0.0 /100 WBCs    RBC 2.92 (L) 4.00 - 5.20 x10*6/uL    Hemoglobin 8.7 (L) 12.0 - 16.0 g/dL    Hematocrit 25.7 (L) 36.0 - 46.0 %    MCV 88 80 - 100 fL    MCH 29.8 26.0 - 34.0 pg    MCHC 33.9 32.0 - 36.0 g/dL    RDW 15.9 (H) 11.5 - 14.5 %    Platelets 70 (L) 150 - 450 x10*3/uL   Magnesium   Result Value Ref Range    Magnesium 2.42 (H) 1.60 - 2.40 mg/dL   Basic Metabolic Panel   Result Value Ref Range    Glucose 215 (H) 74 - 99 mg/dL    Sodium 133 (L) 136 - 145 mmol/L    Potassium 4.2 3.5 - 5.3 mmol/L    Chloride 99 98 - 107 mmol/L    Bicarbonate 26 21 - 32 mmol/L    Anion Gap 12 10 - 20 mmol/L    Urea Nitrogen 38 (H) 6 - 23 mg/dL    Creatinine 1.89 (H) 0.50 - 1.05 mg/dL    eGFR 36 (L) >60 mL/min/1.73m*2    Calcium 8.9 8.6 - 10.6 mg/dL   Phosphorus   Result Value Ref Range    Phosphorus 2.4 (L) 2.5 - 4.9 mg/dL   Blood Gas Arterial Full Panel   Result Value Ref Range    POCT pH, Arterial 7.43 (H) 7.38 - 7.42 pH    POCT pCO2, Arterial 39 38 - 42 mm Hg    POCT pO2, Arterial 112 (H) 85 - 95 mm Hg    POCT SO2, Arterial 100 94 - 100 %    POCT Oxy Hemoglobin, Arterial 96.9 94.0 - 98.0 %    POCT Hematocrit Calculated, Arterial 26.0 (L) 36.0 - 46.0 %    POCT Sodium, Arterial 132 (L) 136 - 145 mmol/L    POCT Potassium, Arterial 4.3 3.5 - 5.3 mmol/L    POCT Chloride, Arterial 99 98 - 107 mmol/L    POCT Ionized Calcium, Arterial 1.16 1.10 - 1.33 mmol/L    POCT Glucose, Arterial 208 (H) 74 - 99 mg/dL    POCT Lactate, Arterial 1.0 0.4 - 2.0 mmol/L    POCT Base Excess, Arterial 1.5 -2.0 - 3.0 mmol/L    POCT HCO3 Calculated, Arterial 25.9 22.0 - 26.0 mmol/L    POCT Hemoglobin, Arterial 8.8 (L) 12.0 - 16.0 g/dL    POCT Anion Gap, Arterial 11 10 - 25 mmo/L    Patient Temperature 37.0 degrees Celsius    FiO2 21 %   ECMO Arterial Blood Gas   Result Value  Ref Range    POCT pH, Arterial ECMO 7.34 Reference range not established pH    POCT pCO2, Arterial ECMO 54 Reference range not established mm Hg    POCT pO2,  Arterial ECMO 426 Reference range not established mm Hg    POCT SO2, Arterial ECMO 100 Reference range not established %    POCT Oxy Hemoglobin, Arterial ECMO 97.5 Reference range not established %    POCT Base Excess, Arterial ECMO 2.2 Reference range not established mmol/L    POCT HCO3 Calculated, Arterial ECMO 29.1 Reference range not established mmol/L    Patient Temperature 37.0 degrees Celsius    FiO2 100 %   ECMO Venous Blood Gas   Result Value Ref Range    POCT pH, Venous ECMO 7.35 Reference range not established pH    POCT pCO2, Venous ECMO 55 Reference range not established mm Hg    POCT pO2,  Venous  ECMO 57 Reference range not established mm Hg    POCT SO2, Venous ECMO 89 Reference range not established %    POCT Oxy Hemoglobin, Venous ECMO 86.6 Reference range not established %    POCT Base Excess, Venous ECMO 3.4 Reference range not established mmol/L    POCT HCO3 Calculated, Venous ECMO 30.4 Reference range not established mmol/L    Patient Temperature 37.0 degrees Celsius    FiO2 100 %     *Note: Due to a large number of results and/or encounters for the requested time period, some results have not been displayed. A complete set of results can be found in Results Review.         Assessment/Plan   Assessment: Ms. 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    Originally presented to [facility] ED on 2/15/24 with complaints of N/V x 3 days and inability to keep medications down. POCUS revealed acute systolic heart failure w/  severe BIV dysfunction when compared to previous images in 11/2023. Also noted to have HIRAM requiring CVVH and transaminitis. Immunosuppression notably below therapeutic range. Admitted to HFICU and treated for suspected acute cellular vs. acute antibody mediated rejection; however, cardiac biopsy negative for signs of rejection. Despite rejection therapy, inotropes and MCS via IABP (2/18/24), the patient's end organ function continued to worsen without improvement in cardiac function. Ultimately placed on left femoral VA ECMO w/ Dr. Marina on 2/19/2024 and transferred to CTICU.     CTICU course complicated by anemia requiring blood product transfusions, volume overload & intubation (tachypnea and increased work of breathing 2/2 pulmonary edema). Status post extubation on 2/24. Now showing renal recovery via diuretics and cardiac allograft recovery.       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  #Acute renal failure 2/2 to cardiorenal syndrome - requiring CVVH  #Acute transaminitis - improved and stabilized     Donor/Recipient Infectious history:  CMV: -/+ (last collected 2/23/24)  Toxo: -/-   Hep C: -/-    Rejection/Prophylaxis (transplants):  - Steroids: Continue 10mg PO prednisone daily  - Tacrolimus: 3.0mg PO @ 0630 & 3.0mg PO @ 1830 w/ daily levels drawn @ 0600  - Serolimus: 2.0mg PO daily w/ daily levels drawn at 0600  - Tacrolimus goal troughs: 3-5  - Serolimus goal troughs: 7-9  - Combined sirolimus/tacrolimus goal of 10-12  - Antifungals: nystatin 500,000units Q6  - Antivirals: valcyte 450mg Q48hrs --> Holding due to thrombocytopenia (2/23)  - Anti PCP & Toxoplasmosis: Bactrim SS daily  --> Holding due to thrombocytopenia (2/23)    Last cardiac biopsy: 2/16/24 with ACR1 and no AMR  Last HLA: 2/16/24 negative for DSAs   Last RHC: 2/16/24 w/ RA 11, PAP 34/14(23), W 19 and C.I. 2.3  Last LHC:  2/18/24 negative for CAV and CAD   Last TTE/BOB: 2/25/24 continue to show severe LVEF dysfunction and RV dysfunction   Osteopenia/osteoporosis prophylaxis: Vitamin D3 and calcium supplements  Peptic/gastric ulcer prophylaxis: Pantoprazole 40mg daily   CAV Prophylaxis: Hold Aspirin 81mg daily due to thrombocytopenia. Okay to resume pravastatin    - Unclear cause of acute severe graft dysfunction. Most likely smoldering ACR vs. AMR; however, 2xallograft biopsies on 2/16 & 2/20 remain negative for any significant pathology. Notably, both biopsies taken after rejection therapies implemented which may have reduced areas of graft damage.    - DSAs remain negative; however, patient may have non-HLA antibodies present. 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   - Negative CAV & CAD via LHC on 2/18/24   - Completed methylpred steroid pulse w/ 1g Q24 x 3 days (2/16-2/18) and prednisone taper   - Thymoglobulin doses: 2/18 & 2/19   - IVIG + PLEX Session: 2/18 & 2/20   - Extubated on 2/24/24 w/ chest x-ray showing minimal pulmonary edema   - CVVH stopped on 2/27/24; now responding to IV diuretics    - LFTs stabilized and lactate normalized.    - Remains on MCS via right femoral IABP set 1:1 and augmenting appropriately & left femoral VA ECMO w/ appropriate flows ~3.3L/FIO2 100%/sweep0.5.  - Epinephrine 0.02mcg/kg/min and cleviprex started for afterload reduction   - Hemodynamics appear appropriate w/ informal ECMO turn down trial to 0.5mL flow on 2/26.    Recommendations:  - Repeat echo today shows improved LVEF to 30% with RV moderately dysfunctional; overall improved since admission.  - Will plan for ECMO decannulation +/- IABP or Impella 5.5 step-down therapy in next 24-48hrs.   - Continue high flow ECMO until decannulation given anticoagulation being held due to ongoing anemia + transfusion requirements   - Goal even to negative 1L in 24hrs via IV diuretics vs. CVVH   - Please continue to obtain CMV PCR weekly  while holding valcyte (last negative on 2/23/24. Next level on 3/1/24)   - Transition liquid sirolimus and tacrolimus to capsules  - Please give an additional 1mg tacrolimus now and increase to 4mg BID starting tonight.          Appreciate continued CTICU care/management.    Patient was examined and discussed with Advanced Heart Failure and Transplant Cardiology attending physician, Dr. UMA Padilla & CTICU team     Heart Transplant Team:   [employer]  [phone] during day shift    Keith Jain, CNP

## 2024-02-27 NOTE — CARE PLAN
The patient's goals for the shift include  to get ECMO out and have pain controlled    The clinical goals for the shift include Patient will remain hemdynamically stable and have pain controlled at acceptable level throughout the shift.    The patient remained hemodynamically stable on 10mg/hr of cleviprex and 0.02mcg/kg/min of epi. Patient remain on ECMO with 3lpm of flow and IABP 1:1. Plan to possibly remove devices tomorrow. Patient received minimal pain medicine today and pain was controlled at acceptable level using both medicinal and non medicinal forms of treatment. Plan to continue to monitor pain and treat accordingly.

## 2024-02-27 NOTE — PROGRESS NOTES
Subjective Data:  Extubated    HF Team rounds:  Plan:      Repeat echo today shows improved LVEF to 30% with RV moderately dysfunctional; overall improved since admission.  - Will plan for ECMO decannulation +/- IABP or Impella 5.5 step-down therapy in next 24-48hrs.   - Continue high flow ECMO until decannulation given anticoagulation being held due to ongoing anemia + transfusion requirements     Objective Data:  Last Recorded Vitals:  Vitals:    02/27/24 1515 02/27/24 1530 02/27/24 1545 02/27/24 1600   BP:       Pulse: (!) 133 (!) 132 (!) 133 (!) 128   Resp: (!) 29 19 15 15   Temp:       TempSrc:       SpO2: 98% 98% 100% 100%   Weight:       Height:           Last Labs:  CBC - 2/27/2024: 12:31 PM  12.0 9.0 83    26.6      CMP - 2/27/2024: 12:31 PM  9.3 6.1 125 --- 0.6   2.9 4.6 105 47      PTT - 2/24/2024: 11:43 PM  1.3   14.9 56     TROPHS   Date/Time Value Ref Range Status   02/16/2024 01:16  0 - 34 ng/L Final     Comment:     Previous result verified on 2/16/2024 0018 on specimen/case [id number] called with component [facility] for procedure Troponin I, High Sensitivity with value 125 ng/L.   02/15/2024 10:03  0 - 34 ng/L Final   11/10/2022 11:22  0 - 34 ng/L Final     Comment:     .  Less than 99th percentile of normal range cutoff-  Female and children under 18 years old <35 ng/L; Male <54 ng/L: Negative  Repeat testing should be performed if clinically indicated.   .  Female and children under 18 years old  ng/L; Male  ng/L:  Consistent with possible cardiac damage and possible increased clinical   risk. Serial measurements may help to assess extent of myocardial damage.   .  >120 ng/L: Consistent with cardiac damage, increased clinical risk and  myocardial infarction. Serial measurements may help assess extent of   myocardial damage.   .   NOTE: Children less than 1 year old may have higher baseline troponin   levels and results should be interpreted in conjunction with the  overall   clinical context.  .  NOTE: Troponin I testing is performed using a different   testing methodology at [facility] than at other   [facility] hospitals. Direct result comparisons should only   be made within the same method.       BNP   Date/Time Value Ref Range Status   02/16/2024 01:16 AM >5,000 0 - 99 pg/mL Final   02/15/2024 10:03 PM >5,000 0 - 99 pg/mL Final     HGBA1C   Date/Time Value Ref Range Status   02/16/2024 01:16 AM 6.3 see below % Final   03/17/2023 08:38 AM 5.7 % Final     Comment:          Diagnosis of Diabetes-Adults   Non-Diabetic: < or = 5.6%   Increased risk for developing diabetes: 5.7-6.4%   Diagnostic of diabetes: > or = 6.5%  .       Monitoring of Diabetes                Age (y)     Therapeutic Goal (%)   Adults:          >18           <7.0   Pediatrics:    13-18           <7.5                   7-12           <8.0                   0- 6            7.5-8.5   American Diabetes Association. Diabetes Care 33(S1), Jan 2010.     12/14/2022 03:41 PM 6.4 % Final     Comment:          Diagnosis of Diabetes-Adults   Non-Diabetic: < or = 5.6%   Increased risk for developing diabetes: 5.7-6.4%   Diagnostic of diabetes: > or = 6.5%  .       Monitoring of Diabetes                Age (y)     Therapeutic Goal (%)   Adults:          >18           <7.0   Pediatrics:    13-18           <7.5                   7-12           <8.0                   0- 6            7.5-8.5   American Diabetes Association. Diabetes Care 33(S1), Jan 2010.       LDLCALC   Date/Time Value Ref Range Status   02/16/2024 01:16 AM 94 <=99 mg/dL Final     Comment:                                 Near   Borderline      AGE      Desirable  Optimal    High     High     Very High     0-19 Y     0 - 109     ---    110-129   >/= 130     ----    20-24 Y     0 - 119     ---    120-159   >/= 160     ----      >24 Y     0 -  99   100-129  130-159   160-189     >/=190       VLDL   Date/Time Value Ref Range Status   02/16/2024  01:16 AM 28 0 - 40 mg/dL Final   07/18/2023 09:24 AM 32 0 - 40 mg/dL Final   03/17/2023 08:38 AM 40 0 - 40 mg/dL Final   11/10/2022 11:22 PM 44 0 - 40 mg/dL Final      Last I/O:  I/O last 3 completed shifts:  In: 1459.7 (15.7 mL/kg) [I.V.:599.7 (6.5 mL/kg); Blood:350; NG/GT:460; IV Piggyback:50]  Out: 4387 (47.3 mL/kg) [Urine:2330 (0.7 mL/kg/hr); Other:2057]  Weight: 92.8 kg     Inpatient Medications:  Scheduled medications   Medication Dose Route Frequency    [Held by provider] aspirin  81 mg oral Daily    calcitriol  0.5 mcg oral Daily    ferrous sulfate (325 mg ferrous sulfate)  65 mg of iron oral Daily with breakfast    heparin (porcine)  5,000 Units subcutaneous q8h    hydrALAZINE  10 mg oral TID    insulin regular  0-5 Units subcutaneous Before meals & nightly    insulin regular  3 Units subcutaneous TID with meals    [START ON 2/28/2024] levothyroxine  200 mcg oral Daily    lidocaine  1 patch transdermal Daily    multivitamin with minerals  1 tablet oral Daily    nystatin  5 mL Swish & Swallow q6h    [START ON 2/28/2024] pantoprazole  40 mg oral Daily before breakfast    perflutren lipid microspheres  0.5-10 mL of dilution intravenous Once in imaging    polyethylene glycol  17 g oral BID    predniSONE  10 mg oral Daily    rosuvastatin  40 mg oral Nightly    [START ON 2/28/2024] sirolimus  2 mg oral Daily    [Held by provider] sulfamethoxazole-trimethoprim  80 mg of trimethoprim oral Daily    tacrolimus  4 mg oral q12h TRICIA    [Held by provider] valGANciclovir  450 mg oral q48h     PRN medications   Medication    acetaminophen    calcium gluconate    calcium gluconate    dextrose    dextrose    Or    glucagon    glucagon    hydrALAZINE    HYDROmorphone    artificial tears    magnesium sulfate    magnesium sulfate    melatonin    oxyCODONE    oxyCODONE    oxymetazoline    potassium chloride    potassium chloride CR     Continuous Medications   Medication Dose Last Rate    clevidipine  1-16 mg/hr 10 mg/hr  (24 1600)    EPINEPHrine  0.02 mcg/kg/min (Dosing Weight) 0.02 mcg/kg/min (24 1600)    lactated Ringer's  5 mL/hr 5 mL/hr (24 1600)    lactated Ringer's  10 mL/hr Stopped (24 1715)     Physical Exam  Constitutional:       Appearance: She is ill-appearing.   Cardiovascular:      Rate and Rhythm: Tachycardia present.      Pulses:           Dorsalis pedis pulses are detected w/ Doppler on the right side.      Comments: IABP inflate/ deflate auscultated,  Was unable to located L DP, primary team faint right pedal, both lower legs and feet warm to touch   Pulmonary:      Effort: Pulmonary effort is normal.      Breath sounds: Normal breath sounds.      Comments: Diminished bases  Abdominal:      Palpations: Abdomen is soft.   Musculoskeletal:      Right lower le+ Edema present.      Left lower le+ Edema present.   Skin:     General: Skin is warm.      Comments: No oozing noted left femoral VA access site; right femoral site stable, minimal oozing   Neurological:      General: No focal deficit present.      Mental Status: She is oriented to person, place, and time. She is lethargic.        Assessment/Plan   Charla Bowles is a 33 year old female with past medical history of heart transplant in 2022 with postoperative course complicated by upper extremity/internal jugular DVTs, and asymptomatic 2R rejection in 2022. She was admitted to HFICU on 2/15 with concern for cardiogenic shock secondary to allograft rejection and decompensated heart failure with multiorgan dysfunction including significant elevation of liver enzymes and nonoliguric acute kidney injury. Endomyocardial biopsy on  revealed mild ACR with +CD4s and negative HLAs, however multisystem organ failure persisted with increased inotropic requirements. IABP placed on . Transferred to CTICU on  for VA ECMO cannulation with Dr. Marina for persistent multisystem dysfunction.     : IABP placed in cath  lab, LH showed right dominant coronary circulation with no significant coronary artery disease, LVEDP 20 2/19: multi-disciplinary review convened; given persistent requirement for pharmacological and mechanical support, and with the addition of oliguric/anuric renal insufficiency, decision made to transfer to CTICU and initiate VA ECMO    2/20: pending endomyocardial biopsy today for further investigation of allograft dysfunction    2/21-22: on VA ecmo, with IABP, CRRT, intubated and on vasopressors    2/23: ECMO wean down trial; considering extubation, considering transition to Impella 5.5     2/26 - Wean VA-ECMO and possible decannulation today. Plan for possible Impella 5.5 placement     2/27  HF Team rounds:  Repeat echo today shows improved LVEF to 30% with RV moderately dysfunctional; overall improved since admission.  - Will plan for ECMO decannulation +/- IABP or Impella 5.5 step-down therapy in next 24-48hrs.   - Continue high flow ECMO until decannulation given anticoagulation being held due to ongoing anemia + transfusion requirements      Groins no swelling and legs/feet warm    Cardiogenic shock 2/2 suspected allograft rejection  S/p OHT 3/2022  - Intra- aortic balloon pump placed on 2/18, VA ECMO cannulation 2/19  - CXR placement satisfactory assess   - Current IABP settings: 1:1, augmentation 111, assisted BP 95/72 (85) site with no hematoma/bleeding   - Plan to wean as hemodynamics per primary team     Recommendations:  - daily CXR with IABP  - please maintain strict bedrest while IABP in place  - closely monitor groin insertion site and distal pulses  - May elevate HOB no greater than 30 degrees  - May log roll patient side to side without flexing involved extremity  - Interventional cardiology will continue to follow, will defer primary care to CICU team     Full Code  Discussed with HF team and ICU     JIMMY Holland-CNP

## 2024-02-27 NOTE — PROGRESS NOTES
4/4 CTICU     OVERVIEW  03/2022 (OHT) Heart Transplant presented to [facility] ED 2/15 for n/v. 2/16 heart biopsy with rejection. 2/18 IABP. 2/19 VA-ECMO & CVVH --> transferred from  to CTICU. Presently 2/20 on room air. CTICU course complicated by ongoing anemia requiring transfusion requirements, volume overload & 2/21 intubation (tachypnea and increased work of breathing 2/2 pulmonary edema). 2/24 extubated.      ROUNDS  Possible decannulation/Impella 5.5      DC PLAN  TBD - Care Transitions is following to develop a safe & supportive discharge plan in collaboration with TRANSPLANT & multidisciplinary teams (along with) patient/family/significant others.       PAYOR  [facility]      COMPLETED  (X) Daily ongoing review of patient via chart and/or (M-F) IDT rounds     Claribel Diaz (LSW, MSW)

## 2024-02-27 NOTE — PROGRESS NOTES
Charla Bowles is a 33 y.o. female on day 12 of admission presenting with Cardiogenic shock (CMS/HCC).    Patient requires ECMO support. Drainage cannula site, cannula, pump, oxygenator, return cannula and return cannula site clinically inspected. RPM and sweep reviewed and adjusted appropriate to clinical context. Anticoagulation status and intensity discussed. Plan for weaning, next clinical steps discussed with team and family. Discussed with cardiothoracic surgeon, perfusion, palliative care and physical/occupational therapy    ECMO Indication: Acute cellular rejection  ECMO Cannula Configuration: Left Fem VA  ECMO circuit run from drainage cannula to pump to oxygenator to return cannula: Yes  Presence of cannula bleeding: None  Presence of oxygenator clot: No  Pre/post PaO2 adequate: Yes  LV Unloading/Pulse Pressure: IABP 1:1  Distal Perfusion: Yes  Anticoagulation Plan/Monitoring: SQH, was on bival but had recurrent groin bleeds/thrombocytopenia  Mobility Plan/Candidacy: Yes  Path to decannulation/anticipated decannulation date: Today or tomorrow  ECMO:     Most Recent Range Past 24hrs   Flow 3.34 Patient Flow (L/min)  Min: 0.21  Max: 3.5   Speed 3200 Circuit Flow (RPM)  Min: 3200  Max: 3200   Sweep 0.5 Sweep Gas Flow Rate (L/min)  Min: 0.5  Max: 1       clevidipine, 1-16 mg/hr, Last Rate: 8 mg/hr (02/27/24 0900)  EPINEPHrine, 0.02 mcg/kg/min (Dosing Weight), Last Rate: 0.02 mcg/kg/min (02/27/24 0900)  lactated Ringer's, 5 mL/hr, Last Rate: 5 mL/hr (02/27/24 0900)  lactated Ringer's, 10 mL/hr, Last Rate: Stopped (02/20/24 1715)  PrismaSol 4/2.5, 2,400 mL/hr, Last Rate: 2,400 mL/hr (02/25/24 3673)                         Demetrio Murrell MD

## 2024-02-27 NOTE — PROGRESS NOTES
CTICU Progress Note  Charla Bowles/[id number]    Admit Date: 2/15/2024  Hospital Length of Stay: 12   ICU Length of Stay: 7d 15h   CT SURGEON:     SUBJECTIVE:   Lasix gtt stopped as pt met diuresis goal  Clevidipine added given high MAPs  Remained on epi 0.02  Plan for repeat TTE today to determine ability to decannulate with or without addition of impella    MEDICATIONS  Infusions:  clevidipine, Last Rate: 4 mg/hr (02/27/24 0700)  EPINEPHrine, Last Rate: 0.02 mcg/kg/min (02/27/24 0700)  lactated Ringer's, Last Rate: 5 mL/hr (02/27/24 0700)  lactated Ringer's, Last Rate: Stopped (02/20/24 1715)  PrismaSol 4/2.5, Last Rate: 2,400 mL/hr (02/25/24 1453)      Scheduled:  [Held by provider] aspirin, 81 mg, Daily  calcitriol, 0.5 mcg, Daily  esomeprazole, 40 mg, Daily before breakfast  ferrous sulfate, 60 mg of iron, Daily  heparin (porcine), 5,000 Units, q8h  insulin regular, 0-5 Units, q6h  insulin regular, 3 Units, q6h  levothyroxine, 200 mcg, Daily  lidocaine, 1 patch, Daily  metOLazone, 5 mg, Daily  multivitamin with iron-minerals, 15 mL, Daily  nystatin, 5 mL, q6h  perflutren lipid microspheres, 0.5-10 mL of dilution, Once in imaging  perflutren protein A microsphere, 0.5 mL, Once in imaging  polyethylene glycol, 17 g, BID  predniSONE, 10 mg, Daily  [Held by provider] rosuvastatin, 40 mg, Nightly  sirolimus, 2 mg, Daily  [Held by provider] sulfamethoxazole-trimethoprim, 80 mg of trimethoprim, Daily  sulfur hexafluoride microsphr, 2 mL, Once in imaging  tacrolimus, 3 mg, q12h TRICIA  [Held by provider] valGANciclovir, 450 mg, q48h      PRN:  acetaminophen, 650 mg, q6h PRN  calcium gluconate, 1 g, q6h PRN  calcium gluconate, 2 g, q6h PRN  dextrose, 25 g, q15 min PRN  dextrose, 25 g, q15 min PRN   Or  glucagon, 1 mg, q15 min PRN  glucagon, 1 mg, q15 min PRN  hydrALAZINE, 10 mg, q4h PRN  HYDROmorphone, 0.2 mg, q3h PRN  artificial tears, 2 drop, PRN  magnesium sulfate, 1 g, q6h PRN  magnesium sulfate, 2 g, q6h  "PRN  melatonin, 5 mg, Nightly PRN  oxyCODONE, 10 mg, q4h PRN  oxyCODONE, 5 mg, q4h PRN  oxymetazoline, 2 spray, q12h PRN  potassium chloride CR, 20 mEq, q6h PRN   Or  potassium chloride, 20 mEq, q6h PRN  potassium chloride, 40 mEq, q6h PRN  promethazine, 12.5 mg, q6h PRN        PHYSICAL EXAM:   Visit Vitals  /63   Pulse (!) 133   Temp 37.1 °C (98.8 °F) (Temporal)   Resp 26   Ht 1.549 m (5' 0.98\")   Wt 92.8 kg (204 lb 9.4 oz)   SpO2 98%   BMI 38.68 kg/m²   OB Status Hysterectomy   Smoking Status Never   BSA 2 m²     Wt Readings from Last 5 Encounters:   02/26/24 92.8 kg (204 lb 9.4 oz)   12/07/23 92.1 kg (203 lb)   12/01/23 93 kg (205 lb)   11/29/23 92.9 kg (204 lb 12.8 oz)   11/09/23 91.3 kg (201 lb 3.2 oz)     INTAKE/OUTPUT:  I/O last 3 completed shifts:  In: 1459.7 (15.7 mL/kg) [I.V.:599.7 (6.5 mL/kg); Blood:350; NG/GT:460; IV Piggyback:50]  Out: 4387 (47.3 mL/kg) [Urine:2330 (0.7 mL/kg/hr); Other:2057]  Weight: 92.8 kg      Physical Exam:   - CONSTITUTION: critically ill, young appearing female on ECMO and IABP  - NEUROLOGIC: Alert and oriented, CAM negative. Moving all extremities with no focal deficits  - CARDIOVASCULAR: ST. On VA ECMO left femoral vein and artery with distal perfusion catheter. Right femoral IABP 1:1 with appropriate augmentation. No hematoma or oozing noted.  - RESPIRATORY: RA and CTAB  - : Singh with clear, yellow urine.   - GI: Soft/ND/NT. Active BS.  - EXTREMITIES:  Right fem IABP, Left fem VA ECMO, small hematoma, warm extremity down to ankle, left foot cool, dopplerable.  NV exam intact x 4, no edema on exam.  - SKIN: WDI  - PSYCHIATRIC: Calm, appropriate    Daily Risk Screen  Central line: required- hemodynamic monitoring, parenteral medications  Singh: required, accurate I/O in critically ill    Invasive Hemodynamics:    Most Recent Range Past 24hrs   BP (Art) 102/65 Arterial Line BP 1  Min: 95/63  Max: 158/74   MAP(Art) 81 mmHg Arterial Line MAP 1 (mmHg)   Min: 78 mmHg  Max: " 110 mmHg   RA/CVP   No data recorded   PA 17/11 PAP  Min: 16/10  Max: 44/27   PA(mean) 13 mmHg PAP (Mean)  Min: 11 mmHg  Max: 32 mmHg   CO 5.7 L/min No data recorded   CI 2.9 L/min/m2 No data recorded   Mixed Venous 79.1 % SVO2 (%)  Min: 64.3 %  Max: 91 %   SVR  758 (dyne*sec)/cm5 No data recorded     ECMO:     Most Recent Range Past 24hrs   Flow 3.32 Patient Flow (L/min)  Min: 0.21  Max: 3.5   Speed 3200 Circuit Flow (RPM)  Min: 3200  Max: 3200   Sweep 0.5 Sweep Gas Flow Rate (L/min)  Min: 0.5  Max: 1      All relevant imaging, diagnostics and labwork reviewed.    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 She was admitted to HFICU on 2/15 with concern for cardiogenic shock secondary to allograft rejection and decompensated heart failure with multiorgan dysfunction including significant elevation of liver enzymes and nonoliguric acute kidney injury. Endomyocardial biopsy on 2/16 revealed mild ACR with +CD4s and negative HLAs, however multisystem organ failure persisted with increased inotropic requirements. IABP placed on 2/18. Transferred to CTICU on 2/19 for VA ECMO cannulation with Dr. Marina for persistent multi-organ system dysfunction. Intubated 2/21 for respiratory failure 2/2 pulmonary edema, extubated 2/24.       Plan:  NEURO: No history. Neuro intact and CAM negative. Acute postoperative pain adequately controlled on current regimen. -->  - Serial neuro and pain assessments  - continue PRN Tylenol, oxy 5-10 mg q4 PRN for mod-severe pain and Dilaudid 0.2 q4 for breakthrough  - PT/OT Consult  - PRN melatonin for sleep  - CAM ICU score qshift. Sleep/wake cycle hygiene     CV: Patient has a history of heart transplant in March of 2022 with TTE on 11/29 with LVEF 60-65%. Admitted for cardiogenic shock with concern for acute cellular  rejection s/p IABP placement (R fem) 2/18 and VA ECMO (left fem) 2/19. ECHO 2/20 with persisting severe BiV dysfunction despite negative biopsy 2/20.  Echo with turn-down 2/22 EF 10%, severely reduced RV, mild AR and mild-moderate MR, Repeat turndown (0.8L) TTE 2/27 with LVEF 30% and moderate RV.   Cardiogenic shock persists but improving, remains on ECMO 3.2L flow/100%FiO2/sweep 0.5 and IABP 1:1 for mechanical support and epinephrine 0.02 mcg/kg/min.  SPA 17-20 and CVP 0-3.  Remains -130. Running hypertensive requiring clevidipine gtt. -->  - Maintain goal MAP 70-90  - Maintain IABP 1:1  - continue full ECMO support as pt off anticoagulation, daily pre/post gases/LDH, NV checks, and dressings per protocol  - Continue epi 0.02mcg/kg/min for inotropic support while weaning mechanical support  - continue clevidipine gtt and PRN hydral 10 mg for MAP >90  - resume home daily rosuvastatin 40mg  - Hold home amlodipine  - awaiting surgical team decision surrounding ECMO decannulation timing and +/- impella insertion    VASC: Vascular surgery previously following for diminished pulses in LLE.  Now palpable DP bilaterally, grossly intact NV exam to BLE, and feet warm, absent LLE PT which is unchanged.   - Vascular surgery signed off     PULM: No history of pulmonary disease. Acute respiratory failure now resolved, intubated 2/21-2/24.  On 4L NC with appropriate oxygenation.  CXR with minimal pulmonary edema. -->  - Daily CXR  - Maintain SPO2 > 92%  - Adjust sweep for normal pH     GI: History of gastric bypass and MMF colitis.  Passed beside swallow eval and tolerating PO, currently NPO for possible OR today.  Mild transaminitis stable.  Previous liquid BM's, now resolved with cessation of TF.  -->  - Continue home PPI   - Continue calcitriol 0.5mg daily  - Continue multivitamin  - Continue calcium carbonate 1,250mg BID  - NPO pending possible OR today  - continue miralax BID     : No history of renal disease,  baseline creatinine 1.2-1.3. Oliguric HIRAM previously requiring CVVH, likely in setting of cardiorenal physiology. Renal US from 2/19 unremarkable.  Cr remains slightly elevated but UOP acceptable and responsive to lasix.  Mild hyponatremia 2/2 hypervolemia, hypophosphatemia replenished.  UOP /hr, -2.2L over the past 24 hours. Appears euvolemic to hypovolemic on exam with CVP 0-3 and globally low filling pressures. -->  - RFP as clinically indicated, replete electrolytes per CTICU protocol  - IVP Lasix PRN if UOP begins to taper off, goal even to -500 today     ENDO: History of hypothyroidism TSH 12.02, free thyroxine 2.19. A1c: 6.3. Intermittent hyperglycemia but overall euglycemic. -->  - Maintain BG <180 with hypoglycemia protocol  - Continue regular SSI #3 q6hs  - Hold scheduled regular insulin with NPO status  - Continue Synthroid 200mcg daily  - Endocrine consulted, appreciate recs     HEME: History of remote DVT. Acute on chronic iron deficiency anemia. Acute blood loss anemia, previously requiring serial transfusions while on systemic AC but hgb now stable without s/s of bleeding.  Stable thrombocytopenia. PF4 2/21 negative.  LDH and pre/post oxygenator gases acceptable. -->    - CBC as clinically indicated  - Holding AC due to previous bleeding, and ASA with thrombocytopenia  - Continue daily ferrous sulfate  - SCDs and SQH for DVT prophylaxis  - Last type and screen: 2/25  - 4/4/2 placed on hold for potential OR     Rejection/prophylaxis: S/p heart transplant 3/31/2022. Induction basiliximab. Donor HCV -, toxo -/-, CMV -/+. Mild ACR with +CD4 and negative HLAs however clinical presentation concern for AMR rejection.  PLEX/IVIG 2/17, 2/20. Thymo 2/18, 2/19. Repeat biopsy 2/20 with no evidence of on going rejection (ACR 0R, pAMR0 and no C3D or C4D)  - Completed steroid taper, continue prednisone 10mg daily  - Continue tacrolimus 3 mg BID with goal level 3-5  - continue sirolimus 2 mg daily with goal  level 7-9  - Plan for no further PLEX/IVIG or thymo  - continue Nystatin suspension 500,000 units q6hr  - Hold bactrim and valcyte in setting of thrombocytopenia     ID: Received Zosyn and Vancomycin on 2/15 in ED. Blood cultures from 2/15 negative. No leukocytosis and afebrile. -->  - Trend temp q4h     Skin: No active skin issues. All ECMO/IABP dressings CDI.  - Preventative Mepilex dressings in place on sacrum and heels  - Change preventative Mepilex weekly or more frequently as indicated (when moist/soiled)   - Every shift skin assessment per nursing and weekly ICU skin rounds  - Moisture barrier to be applied with christopher care  - Active skin problems addressed with nursing on daily rounds     Proph:  SCDs  SQH  Home PPI     G: Line  Right radial arterial line placed 2/16  RIJ introducer with PAC placed 2/16  LIF Trialysis placed 2/17  Right femoral IABP placed 2/18  8F Left femoral reperfusion cannula placed 2/19  Left femoral 17F arterial ECMO cannula placed 2/19  Left femoral 25F venous ECMO cannula placed 2/19    Will discuss line exchange as appropriate once operative plan finalized.     F: Family: Mom updated at bedside this AM.    Restraints: Not indicated    Code status: Full Code     A,B,C,D,E,F,: reviewed    Dispo: CTICU    I personally spent 65 minutes of critical care time directly and personally managing the patient exclusive of separately billable procedures.    Keara Eller, APRN-CNP  CTICU [id number]

## 2024-02-27 NOTE — PROGRESS NOTES
Charla Bowles is a 33 y.o. female on day 12 of admission presenting with Cardiogenic shock (CMS/HCC).    I have seen the patient either independently or with an associated resident physician or advanced practice provider    Overnight Events: Robust urine output. Continue Epi 02. Improved EF with turndown trial     Neuro: Working with PT/OT. AaOx3  Cardiac: [facility] ECMO with IABP for presumed ACR. Ecmo turndown with improved EF. Decision for either impella 5.5 or decannulation with inotropes. Continue Epi 02. IABP 1: 1 continues   Pacer: None  Pulmonary: 2-4 L NC. CXR appears well  Gastrointestinal: Benign abdominal exam. Mild transaminitis. NPO for possible decannulation/impella.   Renal: HIRAM with Cr today 1.8, robust diuresis yesterday with furosemide -2.2L over past 24 hours. Hyponatremia improving with diuresis  Endocrine: Hx of hypothyroidism, continue levothyroxine 200 mcg/daily. Insulin per Endo  Hematology: H/H stable, no bleeding. Currently off AC due to bleeding. SQH   Infectious Disease: Afebrile  Antirejection: s/p Plex, Thymo. Changing tacro/siro from liquid to pill, continue 20 mg prednisone. Will follow Tacro/Siro levels. Hold off on valgnciclovir, No treatment antibiotics  Musculoskeletal: Extremity exam warm, with good pulses    Lines/Tubes/Drains: All to remain    Mechanical Circulatory Support: ECMO:     Most Recent Range Past 24hrs   Flow 3.34 Patient Flow (L/min)  Min: 0.21  Max: 3.5   Speed 3200 Circuit Flow (RPM)  Min: 3200  Max: 3200   Sweep 0.5 Sweep Gas Flow Rate (L/min)  Min: 0.5  Max: 1         Prophylaxis: SQH, PPI  Med to Discontinue: None    Disposition: CTICU    ABCDEF Checklist  Analgesia: Spontaneous awakening trial to be pursued if clinically appropriate. RASS goal reviewed  Breathing: Spontaneous breathing trial to be pursued if clinically appropriate. Mechanical power of assisted ventilation reviewed  Choice of analgesia/sedation: Analgesic and sedative agents adjusted per  clinical context.   Delirium assessed by CAM, will avoid exacerbating factors  Early mobility and exercise: Physical and occupational therapy engaged  Family: Plan of care, overall trajectory of patient shared with family. Questions elicited and answered as appropriate.       Due to the high probability of life threatening clinical decompensation, the patient required critical care time evaluating and managing this patient.  Critical care time included obtaining a history, examining the patient, ordering and reviewing studies, discussing, developing, and implementing a management plan, evaluating the patient's response to treatment, and discussion with other care team providers. I saw and evaluated the patient myself.  Critical care time was performed exclusive of billable procedures.    Critical care time: 46            Demetrio Murrell MD
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
## 2024-02-28 NOTE — PROGRESS NOTES
[facility]  ADVANCED HEART FAILURE AND TRANSPLANT CARDIOLOGY     Charla Bowles is a 33 y.o. female on day 9 of admission presenting with Cardiogenic shock (CMS/HCC).    INTERVAL EVENTS / PERTINENT ROS:    Overnight patient developed right nasal epistaxis requiring ENT consultation and packing. Hemostasis achieved. No other acute events.     Objective     Physical Exam  Constitutional:       General: She is not in acute distress.     Appearance: She is obese.   HENT:      Head: Normocephalic.      Comments: +[place] catheter on right internal jugular, Trialysis line on left IJ     Mouth/Throat:      Mouth: Mucous membranes are moist.   Eyes:      Pupils: Pupils are equal, round, and reactive to light.   Cardiovascular:      Rate and Rhythm: Regular rhythm. Tachycardia present.      Pulses: Normal pulses.      Heart sounds: Normal heart sounds. No murmur heard.     No friction rub. No gallop.      Comments: Left femoral VA ECMO flowing ~3.0L/ FIO2 100%/sweep 0.5L/m. Right femoral IABP in place set 1:1 and augmenting appropriately.   Pulmonary:      Effort: Pulmonary effort is normal. No accessory muscle usage or retractions.      Breath sounds: No wheezing, rhonchi or rales.      Comments: Breathing comfortable on nasal cannula. Equal chest rise bilaterally.   Abdominal:      General: Abdomen is flat. Bowel sounds are normal. There is no distension.      Palpations: Abdomen is soft. There is no mass.      Tenderness: There is no abdominal tenderness. There is no guarding.      Hernia: No hernia is present.   Musculoskeletal:      Cervical back: Full passive range of motion without pain.   Skin:     General: Skin is warm and dry.      Capillary Refill: Capillary refill takes less than 2 seconds.      Coloration: Skin is not jaundiced.      Findings: No laceration, rash or wound.   Neurological:      General: No focal deficit present.      Mental Status: She is alert.       "Comments: Awake, alert, oriented x3, following commands appropriately.   Psychiatric:         Mood and Affect: Mood normal.         Behavior: Behavior normal.       Last Recorded Vitals  Blood pressure 142/63, pulse (!) 121, temperature 37.5 °C (99.5 °F), temperature source Core, resp. rate 16, height 1.549 m (5' 0.98\"), weight 91.3 kg (201 lb 4.5 oz), SpO2 100 %.  Intake/Output last 3 Shifts:  I/O last 3 completed shifts:  In: 1417.6 (15.5 mL/kg) [I.V.:967.6 (10.6 mL/kg); Blood:100; IV Piggyback:350]  Out: 6140 (67.3 mL/kg) [Urine:5810 (1.8 mL/kg/hr); Other:330]  Weight: 91.3 kg     Relevant Results  Scheduled medications  acetaminophen, 975 mg, oral, q6h  aspirin, 81 mg, oral, Daily  calcitriol, 0.5 mcg, oral, Daily  ferrous sulfate (325 mg ferrous sulfate), 65 mg of iron, oral, Daily with breakfast  heparin (porcine), 5,000 Units, subcutaneous, q8h  hydrALAZINE, 25 mg, oral, TID  insulin regular, 0-10 Units, subcutaneous, Before meals & nightly  insulin regular, 3 Units, subcutaneous, TID with meals  levothyroxine, 200 mcg, oral, Daily  lidocaine, 1 patch, transdermal, Daily  melatonin, 10 mg, oral, Nightly  multivitamin with minerals, 1 tablet, oral, Daily  nystatin, 5 mL, Swish & Swallow, q6h  oxymetazoline, 2 spray, Each Nostril, TID  pantoprazole, 40 mg, oral, Daily before breakfast  polyethylene glycol, 17 g, oral, BID  predniSONE, 10 mg, oral, Daily  rosuvastatin, 40 mg, oral, Nightly  sennosides-docusate sodium, 1 tablet, oral, Daily  sirolimus, 2 mg, oral, Daily  sodium chloride, 5 spray, Each Nostril, 4x daily  [Held by provider] sulfamethoxazole-trimethoprim, 80 mg of trimethoprim, oral, Daily  tacrolimus, 4 mg, oral, q12h TRICIA  traZODone, 50 mg, oral, Nightly  [Held by provider] valGANciclovir, 450 mg, oral, q48h      Continuous medications  clevidipine, 1-16 mg/hr, Last Rate: 4 mg/hr (02/28/24 0800)  EPINEPHrine, 0.02 mcg/kg/min (Dosing Weight), Last Rate: 0.02 mcg/kg/min (02/28/24 0800)  lactated " Ringer's, 5 mL/hr, Last Rate: 5 mL/hr (02/28/24 0800)  lactated Ringer's, 10 mL/hr, Last Rate: 10 mL/hr (02/28/24 0800)      PRN medications  PRN medications: calcium gluconate, calcium gluconate, dextrose, dextrose **OR** glucagon, glucagon, hydrALAZINE, artificial tears, magnesium sulfate, magnesium sulfate, mupirocin, oxyCODONE, oxyCODONE, potassium chloride, potassium chloride CR **OR** [DISCONTINUED] potassium chloride    Results for orders placed or performed during the hospital encounter of 02/15/24 (from the past 24 hour(s))   Blood Gas Arterial Full Panel   Result Value Ref Range    POCT pH, Arterial 7.50 (H) 7.38 - 7.42 pH    POCT pCO2, Arterial 33 (L) 38 - 42 mm Hg    POCT pO2, Arterial 94 85 - 95 mm Hg    POCT SO2, Arterial 99 94 - 100 %    POCT Oxy Hemoglobin, Arterial 96.0 94.0 - 98.0 %    POCT Hematocrit Calculated, Arterial 28.0 (L) 36.0 - 46.0 %    POCT Sodium, Arterial 131 (L) 136 - 145 mmol/L    POCT Potassium, Arterial 4.1 3.5 - 5.3 mmol/L    POCT Chloride, Arterial      POCT Ionized Calcium, Arterial 1.12 1.10 - 1.33 mmol/L    POCT Glucose, Arterial 205 (H) 74 - 99 mg/dL    POCT Lactate, Arterial 0.6 0.4 - 2.0 mmol/L    POCT Base Excess, Arterial 2.6 -2.0 - 3.0 mmol/L    POCT HCO3 Calculated, Arterial 25.7 22.0 - 26.0 mmol/L    POCT Hemoglobin, Arterial 9.4 (L) 12.0 - 16.0 g/dL    POCT Anion Gap, Arterial      Patient Temperature 37.0 degrees Celsius    FiO2 21 %   Calcium, Ionized   Result Value Ref Range    POCT Calcium, Ionized 1.13 1.1 - 1.33 mmol/L   CBC   Result Value Ref Range    WBC 12.0 (H) 4.4 - 11.3 x10*3/uL    nRBC 0.0 0.0 - 0.0 /100 WBCs    RBC 3.06 (L) 4.00 - 5.20 x10*6/uL    Hemoglobin 9.0 (L) 12.0 - 16.0 g/dL    Hematocrit 26.6 (L) 36.0 - 46.0 %    MCV 87 80 - 100 fL    MCH 29.4 26.0 - 34.0 pg    MCHC 33.8 32.0 - 36.0 g/dL    RDW 15.8 (H) 11.5 - 14.5 %    Platelets 83 (L) 150 - 450 x10*3/uL   Magnesium   Result Value Ref Range    Magnesium 2.22 1.60 - 2.40 mg/dL   Renal function  panel   Result Value Ref Range    Glucose 208 (H) 74 - 99 mg/dL    Sodium 134 (L) 136 - 145 mmol/L    Potassium 4.0 3.5 - 5.3 mmol/L    Chloride 94 (L) 98 - 107 mmol/L    Bicarbonate 27 21 - 32 mmol/L    Anion Gap 17 10 - 20 mmol/L    Urea Nitrogen 48 (H) 6 - 23 mg/dL    Creatinine 2.29 (H) 0.50 - 1.05 mg/dL    eGFR 28 (L) >60 mL/min/1.73m*2    Calcium 9.3 8.6 - 10.6 mg/dL    Phosphorus 2.9 2.5 - 4.9 mg/dL    Albumin 4.6 3.4 - 5.0 g/dL   POCT GLUCOSE   Result Value Ref Range    POCT Glucose 163 (H) 74 - 99 mg/dL   ECG 12 lead   Result Value Ref Range    Ventricular Rate 135 BPM    Atrial Rate 135 BPM    NH Interval 152 ms    QRS Duration 80 ms    QT Interval 376 ms    QTC Calculation(Bazett) 564 ms    P Axis 39 degrees    R Axis -12 degrees    T Axis 50 degrees    QRS Count 22 beats    Q Onset 223 ms    T Offset 411 ms    QTC Fredericia 492 ms   ECMO Arterial Blood Gas   Result Value Ref Range    POCT pH, Arterial ECMO 7.34 Reference range not established pH    POCT pCO2, Arterial ECMO 61 Reference range not established mm Hg    POCT pO2,  Arterial ECMO 423 Reference range not established mm Hg    POCT SO2, Arterial ECMO      POCT Oxy Hemoglobin, Arterial ECMO      POCT Base Excess, Arterial ECMO 5.2 Reference range not established mmol/L    POCT HCO3 Calculated, Arterial ECMO 32.9 Reference range not established mmol/L    Patient Temperature 37.0 degrees Celsius    FiO2 100 %   Lactate Dehydrogenase   Result Value Ref Range     (H) 84 - 246 U/L   ECMO Venous Blood Gas   Result Value Ref Range    POCT pH, Venous ECMO 7.30 Reference range not established pH    POCT pCO2, Venous ECMO 64 Reference range not established mm Hg    POCT pO2,  Venous  ECMO 51 Reference range not established mm Hg    POCT SO2, Venous ECMO 85 Reference range not established %    POCT Oxy Hemoglobin, Venous ECMO 82.8 Reference range not established %    POCT Base Excess, Venous ECMO 3.2 Reference range not established mmol/L    POCT  HCO3 Calculated, Venous ECMO 31.5 Reference range not established mmol/L    Patient Temperature 37.0 degrees Celsius    FiO2 100 %   Hepatic function panel   Result Value Ref Range    Albumin 3.9 3.4 - 5.0 g/dL    Bilirubin, Total 0.5 0.0 - 1.2 mg/dL    Bilirubin, Direct 0.2 0.0 - 0.3 mg/dL    Alkaline Phosphatase 44 33 - 110 U/L    ALT 68 (H) 7 - 45 U/L    AST 48 (H) 9 - 39 U/L    Total Protein 6.2 (L) 6.4 - 8.2 g/dL   Tacrolimus level   Result Value Ref Range    Tacrolimus  2.5 <=15.0 ng/mL   Calcium, Ionized   Result Value Ref Range    POCT Calcium, Ionized 1.09 (L) 1.1 - 1.33 mmol/L   CBC   Result Value Ref Range    WBC 10.3 4.4 - 11.3 x10*3/uL    nRBC 0.0 0.0 - 0.0 /100 WBCs    RBC 2.62 (L) 4.00 - 5.20 x10*6/uL    Hemoglobin 7.3 (L) 12.0 - 16.0 g/dL    Hematocrit 22.9 (L) 36.0 - 46.0 %    MCV 87 80 - 100 fL    MCH 27.9 26.0 - 34.0 pg    MCHC 31.9 (L) 32.0 - 36.0 g/dL    RDW 15.0 (H) 11.5 - 14.5 %    Platelets 85 (L) 150 - 450 x10*3/uL   Magnesium   Result Value Ref Range    Magnesium 2.50 (H) 1.60 - 2.40 mg/dL   Renal function panel   Result Value Ref Range    Glucose 168 (H) 74 - 99 mg/dL    Sodium 132 (L) 136 - 145 mmol/L    Potassium 3.5 3.5 - 5.3 mmol/L    Chloride 92 (L) 98 - 107 mmol/L    Bicarbonate 28 21 - 32 mmol/L    Anion Gap 16 10 - 20 mmol/L    Urea Nitrogen 76 (H) 6 - 23 mg/dL    Creatinine 2.40 (H) 0.50 - 1.05 mg/dL    eGFR 27 (L) >60 mL/min/1.73m*2    Calcium 8.9 8.6 - 10.6 mg/dL    Phosphorus 4.2 2.5 - 4.9 mg/dL    Albumin 3.9 3.4 - 5.0 g/dL   Blood Gas Arterial Full Panel   Result Value Ref Range    POCT pH, Arterial 7.51 (H) 7.38 - 7.42 pH    POCT pCO2, Arterial 36 (L) 38 - 42 mm Hg    POCT pO2, Arterial 120 (H) 85 - 95 mm Hg    POCT SO2, Arterial 100 94 - 100 %    POCT Oxy Hemoglobin, Arterial 97.1 94.0 - 98.0 %    POCT Hematocrit Calculated, Arterial 23.0 (L) 36.0 - 46.0 %    POCT Sodium, Arterial 130 (L) 136 - 145 mmol/L    POCT Potassium, Arterial 3.4 (L) 3.5 - 5.3 mmol/L    POCT  Chloride, Arterial 93 (L) 98 - 107 mmol/L    POCT Ionized Calcium, Arterial 1.11 1.10 - 1.33 mmol/L    POCT Glucose, Arterial 183 (H) 74 - 99 mg/dL    POCT Lactate, Arterial 0.4 0.4 - 2.0 mmol/L    POCT Base Excess, Arterial 5.3 (H) -2.0 - 3.0 mmol/L    POCT HCO3 Calculated, Arterial 28.7 (H) 22.0 - 26.0 mmol/L    POCT Hemoglobin, Arterial 7.7 (L) 12.0 - 16.0 g/dL    POCT Anion Gap, Arterial 12 10 - 25 mmo/L    Patient Temperature 37.0 degrees Celsius    FiO2 21 %   Type And Screen   Result Value Ref Range    ABO TYPE O     Rh TYPE POS    POCT GLUCOSE   Result Value Ref Range    POCT Glucose 197 (H) 74 - 99 mg/dL     *Note: Due to a large number of results and/or encounters for the requested time period, some results have not been displayed. A complete set of results can be found in Results Review.         Assessment/Plan   Assessment: Ms. 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    Originally presented to [facility] ED on 2/15/24 with complaints of N/V x 3 days and inability to keep medications down. POCUS revealed acute systolic heart failure w/ severe BIV dysfunction when compared to previous images in 11/2023. Also noted to have HIRAM requiring CVVH and transaminitis. Immunosuppression notably below therapeutic range. Admitted to HFICU and treated for suspected acute cellular vs. acute antibody mediated rejection; however, cardiac biopsy negative for signs of rejection. Despite rejection therapy, inotropes and MCS via IABP (2/18/24), the patient's end organ function continued to worsen without improvement in cardiac function. Ultimately placed on left femoral VA ECMO w/ Dr. Marina on  2/19/2024 and transferred to CTICU.     CTICU course complicated by anemia requiring blood product transfusions, volume overload & intubation (tachypnea and increased work of breathing 2/2 pulmonary edema). Status post extubation on 2/24. Now showing renal off CVVH and cardiac allograft recovery.       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  #Acute renal failure 2/2 to cardiorenal syndrome - improving  #Acute transaminitis - improved and stabilized     Donor/Recipient Infectious history:  CMV: -/+ (last collected 2/23/24)  Toxo: -/-   Hep C: -/-    Rejection/Prophylaxis (transplants):  - Steroids: Continue 10mg PO prednisone daily  - Tacrolimus: 3.0mg PO @ 0630 & 3.0mg PO @ 1830 w/ daily levels drawn @ 0600  - Serolimus: 2.0mg PO daily w/ daily levels drawn at 0600  - Tacrolimus goal troughs: 3-5  - Serolimus goal troughs: 7-9  - Combined sirolimus/tacrolimus goal of 10-12  - Antifungals: nystatin 500,000units Q6  - Antivirals: valcyte 450mg Q48hrs --> Holding due to thrombocytopenia (2/23)  - Anti PCP & Toxoplasmosis: Bactrim SS daily  --> Holding due to thrombocytopenia (2/23)    Last cardiac biopsy: 2/16/24 with ACR1 and no AMR  Last HLA: 2/16/24 negative for DSAs   Last RHC: 2/16/24 w/ RA 11, PAP 34/14(23), W 19 and C.I. 2.3  Last LHC: 2/18/24 negative for CAV and CAD   Last TTE/BOB: 2/28/24 shows improved LVEF to 30% and mild-mod RV dysfunction (ECMO turndown)   Osteopenia/osteoporosis prophylaxis: Vitamin D3 and calcium supplements  Peptic/gastric ulcer prophylaxis: Pantoprazole 40mg daily   CAV Prophylaxis: Aspirin 81mg daily & pravastatin daily    - Unclear cause of acute severe graft dysfunction. Most likely smoldering ACR vs. AMR; however, 2xallograft biopsies on 2/16 & 2/20 remain negative for any significant pathology. Notably, both biopsies taken after rejection therapies implemented  which may have reduced areas of graft damage.    - DSAs remain negative; however, patient may have non-HLA antibodies present. 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   - Negative CAV & CAD via LHC on 2/18/24   - Completed methylpred steroid pulse w/ 1g Q24 x 3 days (2/16-2/18) and prednisone taper   - Thymoglobulin doses: 2/18 & 2/19   - IVIG + PLEX Session: 2/18 & 2/20   - Extubated on 2/24/24 w/ chest x-ray showing minimal pulmonary edema   - CVVH stopped on 2/27/24; now responding to IV diuretics    - LFTs stabilized and lactate normalized.    - Remains on MCS via right femoral IABP set 1:1 and augmenting appropriately & left femoral VA ECMO w/ appropriate flows ~3.3L/FIO2 100%/sweep0.5.  - Epinephrine 0.02mcg/kg/min and cleviprex started for afterload reduction   - Hemodynamics appear appropriate w/ formal ECMO turn down trial to no flow, w/ EF ~30% and mild-moderate RV dysfunction.     Recommendations:  - Agree with plan for ECMO decannulation +/- IABP or Impella 5.5 step-down therapy  - Continue high flow ECMO until decannulation given anticoagulation being held due to ongoing anemia, epistaxis and transfusion requirements   - Agree with PRBC now for anemia and hypovolemia   - Agree with gentle fluid resuscitation   - Please continue to obtain CMV PCR weekly while holding valcyte (last negative on 2/23/24. Next level on 3/1/24)   - Increase sirolimus to 2.5mg daily tomorrow AM and continue tacro 4mg BID        Appreciate continued CTICU care/management.    Patient was examined and discussed with Advanced Heart Failure and Transplant Cardiology attending physician, Dr. UMA Padilla & CTICU team     Heart Transplant Team:   [employer] Secure Chat  [phone] during day shift    Keith Jain CNP

## 2024-02-28 NOTE — PROGRESS NOTES
Charla Bowles is a 33 y.o. female on day 13 of admission presenting with Cardiogenic shock (CMS/HCC).    I have seen the patient either independently or with an associated resident physician or advanced practice provider    Overnight Events: Epistaxis that required R nasal surgicel. TTE turndown trial this AM    Neuro: Neuro intact. APAP/Oxy for pain. Melatonin for sleep.   ENT: Afrin TID, nasal spray, mupirocin this PM. Surgicel/pressure if bleeding recurs  Cardiac: VA ECMO for ACR complicating 2022 OHT. EF 30% with mild to moderate RV on TTE yesterday, repeat turndown today. IABP 1:1. Epi 02. Goal for decannulation to inotropes +/- IABP today or tomorrow. Lower filling pressures with autodiuresis, will add back some IVF. Hydral to help wean off clevidipine. Home statin/ASA   Pacer: None  Pulmonary: Room air. CXR is clear without pulmonary edema  Gastrointestinal: NPO for decannulation  Renal: Autodiuresing, - 3L. Now bordering on hypovolemic, will give some fluid back.   Endocrine: Hypothyroidism on levothyroxine, ISS  Hematology: H/H 7.3, giving a unit given relative hypovolemia, epistaxis and possible OR today  Infectious Disease: Afebrile, no abx  Immuno: Tacro 4 mg BID, Siro 2mg every day, Pred 10 mg. Goal tac 6-9 level  Musculoskeletal: No issues    Lines/Tubes/Drains: ECMO cannulas, trialysis catheter, a line, PAC    Mechanical Circulatory Support: ECMO:     Most Recent Range Past 24hrs   Flow 3.11 Patient Flow (L/min)  Min: 2.91  Max: 3.34   Speed 3000 Circuit Flow (RPM)  Min: 3000  Max: 3200   Sweep 0.5 Sweep Gas Flow Rate (L/min)  Min: 0.5  Max: 0.5         Prophylaxis: SCDs, PPI, SQH  Med to Discontinue: SSI 1    Disposition: CTICU    ABCDEF Checklist  Analgesia: Spontaneous awakening trial to be pursued if clinically appropriate. RASS goal reviewed  Breathing: Spontaneous breathing trial to be pursued if clinically appropriate. Mechanical power of assisted ventilation reviewed  Choice of  analgesia/sedation: Analgesic and sedative agents adjusted per clinical context.   Delirium assessed by CAM, will avoid exacerbating factors  Early mobility and exercise: Physical and occupational therapy engaged  Family: Plan of care, overall trajectory of patient shared with family. Questions elicited and answered as appropriate.       Due to the high probability of life threatening clinical decompensation, the patient required critical care time evaluating and managing this patient.  Critical care time included obtaining a history, examining the patient, ordering and reviewing studies, discussing, developing, and implementing a management plan, evaluating the patient's response to treatment, and discussion with other care team providers. I saw and evaluated the patient myself.  Critical care time was performed exclusive of billable procedures.    Critical care time: 51            Demetrio Murrell MD

## 2024-02-28 NOTE — PROGRESS NOTES
"Nutrition Follow Up Assessment:   Nutrition Assessment    Reason for Assessment: Dietitian discretion (follow up eval)    Nutrition History:  Energy Intake: Good > 75 %, Fair 50-75 % (per flow sheets on 2/26-27)  Food and Nutrient History: Since intial eval pt extubated, passed swallow eval for diet however has been NPO in anticipation of procedures. Plan to go to OR for ECMO decannulation & Impella 5.5 insertion. Tolerating PO intakes per staff.  GI Symptoms: last BM 2/25  Oral Problems: None       Anthropometrics:  Height: 154.9 cm (5' 0.98\")   Weight: 91.3 kg (201 lb 4.5 oz)   BMI (Calculated): 38.05  IBW/kg (Dietitian Calculated): 47.7 kg  Percent of IBW: 191 %  Adjusted Body Weight (kg): 59 kg    Weight History:   Date/Time Weight Dosing Weight   02/27/24 0815 91.3 kg (201 lb 4.5 oz) --   02/26/24 0936 92.8 kg (204 lb 9.4 oz) --   02/23/24 1300 91.7 kg (202 lb 2.6 oz) --   02/21/24 2000 99.6 kg (219 lb 9.3 oz) 99.6 kg (219 lb 9.3 oz)   02/21/24 0835 -- 99.6 kg (219 lb 9.3 oz)   02/21/24 0317 99.6 kg (219 lb 9.3 oz) --   02/20/24 0200 103 kg (226 lb 13.7 oz) --   02/19/24 1631 100 kg (220 lb 7.4 oz) --   02/19/24 0547 100 kg (221 lb 5.5 oz) --   02/17/24 0600 99.5 kg (219 lb 5.7 oz) --   02/16/24 2154 99 kg (218 lb 4.1 oz) --   02/16/24 0546 99 kg (218 lb 4.1 oz) --   02/16/24 0210 99 kg (218 lb 4.1 oz) --   02/16/24 0100 99 kg (218 lb 4.1 oz) --   02/15/24 2116 92.1 kg (203 lb) --     Weight Change %: +/- from fluid fluctuations --requiring CVVH       Nutrition Focused Physical Exam Findings:  defer: pt has been anxious per team   Subcutaneous Fat Loss:      Muscle Wasting:     Edema:  Edema: +2 mild  Edema Location: generalized  Physical Findings:  Skin: Positive    Nutrition Significant Labs:  CBC Trend:   Results from last 7 days   Lab Units 02/28/24  0443 02/27/24  1231 02/27/24  0016 02/26/24  1255   WBC AUTO x10*3/uL 10.3 12.0* 9.9 11.8*   RBC AUTO x10*6/uL 2.62* 3.06* 2.92* 3.00*   HEMOGLOBIN g/dL 7.3* " 9.0* 8.7* 8.7*   HEMATOCRIT % 22.9* 26.6* 25.7* 26.5*   MCV fL 87 87 88 88   PLATELETS AUTO x10*3/uL 85* 83* 70* 71*    , A1C:  Lab Results   Component Value Date    HGBA1C 6.3 (H) 02/16/2024   , BG POCT trend:   Results from last 7 days   Lab Units 02/28/24  0845 02/27/24  1813 02/26/24  1129 02/26/24  0740 02/25/24  1836   POCT GLUCOSE mg/dL 197* 163* 196* 138* 157*    , Liver Function Trend:   Results from last 7 days   Lab Units 02/28/24  0442 02/27/24  0016 02/26/24  0120 02/24/24  2343   ALK PHOS U/L 44 47 44 44   AST U/L 48* 125* 101* 77*   ALT U/L 68* 105* 85* 73*   BILIRUBIN TOTAL mg/dL 0.5 0.6 0.4 0.6    , Renal Lab Trend:   Results from last 7 days   Lab Units 02/28/24  0443 02/27/24  1231 02/27/24  0016 02/26/24  1255   POTASSIUM mmol/L 3.5 4.0 4.2 4.7   PHOSPHORUS mg/dL 4.2 2.9 2.4* 1.6*   SODIUM mmol/L 132* 134* 133* 136   MAGNESIUM mg/dL 2.50* 2.22 2.42* 2.41*   EGFR mL/min/1.73m*2 27* 28* 36* 51*   BUN mg/dL 76* 48* 38* 31*   CREATININE mg/dL 2.40* 2.29* 1.89* 1.40*       Nutrition Specific Medications:  Scheduled medications  acetaminophen, 975 mg, oral, q6h  aspirin, 81 mg, oral, Daily  calcitriol, 0.5 mcg, oral, Daily  ferrous sulfate (325 mg ferrous sulfate), 65 mg of iron, oral, Daily with breakfast  heparin (porcine), 5,000 Units, subcutaneous, q8h  hydrALAZINE, 25 mg, oral, TID  insulin regular, 0-10 Units, subcutaneous, Before meals & nightly  insulin regular, 3 Units, subcutaneous, TID with meals  levothyroxine, 200 mcg, oral, Daily  lidocaine, 1 patch, transdermal, Daily  melatonin, 10 mg, oral, Nightly  multivitamin with minerals, 1 tablet, oral, Daily  nystatin, 5 mL, Swish & Swallow, q6h  oxymetazoline, 2 spray, Each Nostril, TID  pantoprazole, 40 mg, oral, Daily before breakfast  polyethylene glycol, 17 g, oral, BID  predniSONE, 10 mg, oral, Daily  rosuvastatin, 40 mg, oral, Nightly  sennosides-docusate sodium, 1 tablet, oral, Daily  sirolimus, 2 mg, oral, Daily  sodium chloride, 5  spray, Each Nostril, 4x daily  [Held by provider] sulfamethoxazole-trimethoprim, 80 mg of trimethoprim, oral, Daily  tacrolimus, 4 mg, oral, q12h TRICIA  traZODone, 50 mg, oral, Nightly  [Held by provider] valGANciclovir, 450 mg, oral, q48h      Continuous medications  clevidipine, 1-16 mg/hr, Last Rate: 4 mg/hr (02/28/24 0800)  EPINEPHrine, 0.02 mcg/kg/min (Dosing Weight), Last Rate: 0.02 mcg/kg/min (02/28/24 0800)  lactated Ringer's, 5 mL/hr, Last Rate: 5 mL/hr (02/28/24 0800)  lactated Ringer's, 10 mL/hr, Last Rate: 10 mL/hr (02/28/24 0800)      PRN medications  PRN medications: calcium gluconate, calcium gluconate, dextrose, dextrose **OR** glucagon, glucagon, hydrALAZINE, artificial tears, magnesium sulfate, magnesium sulfate, mupirocin, oxyCODONE, oxyCODONE, potassium chloride, potassium chloride CR **OR** [DISCONTINUED] potassium chloride      I/O:   Last BM Date: 02/24/24; Stool Appearance: Watery (02/25/24 1300)        Dietary Orders (From admission, onward)       Start     Ordered    02/28/24 0001  NPO Diet Except: Sips with meds; Effective midnight  Diet effective midnight        Question:  Except:  Answer:  Sips with meds    02/27/24 1326                     Estimated Needs:   Total Energy Estimated Needs (kCal): 1800 kCal (recalculated)  Method for Estimating Needs: 30 kcals/kf of adjusted BW ; REE using MSJ = 1562 kcals/d  Total Protein Estimated Needs (g):  (75-90)  Method for Estimating Needs: ~1.3-1.5 gm/kg of adjusted BW  Total Fluid Estimated Needs (mL):  (per team)           Nutrition Diagnosis   Malnutrition Diagnosis  Patient has Malnutrition Diagnosis: No (based on available data)    Nutrition Diagnosis  Patient has Nutrition Diagnosis: Yes  Diagnosis Status (1): Ongoing  Nutrition Diagnosis 1: Increased nutrient needs (protein)  Related to (1): hypercatabolism/anabolic resistance associated with critical illness  As Evidenced by (1): remains on ECMO & CVVH  Additional Nutrition Diagnosis:  Diagnosis 2  Diagnosis Status (2): Ongoing  Nutrition Diagnosis 2: Unintended weight gain  Related to (2): acute on chronic illness  As Evidenced by (2): up 41# since end of 3/29/23  Additional Assessment Information (2): wt now trending down from fluid removal       Nutrition Interventions/Recommendations   Regular diet when not NPO for procedures  Prescribe Ensure High Protein TID as needed         Nutrition Prescription:  Individualized Nutrition Prescription Provided for : liberalize to a regular diet for 1800 kcals and 75 gm protein per day        Nutrition Interventions:   Interventions: Meals and snacks  Meals and Snacks: Protein-modified diet  Goal: encourage HBV pro food sources with meals/snacks for optimal nutrition > prescribe ONS as needed    Collaboration and Referral of Nutrition Care: Team meeting involving nutrition professional  Goal: CTICU daily IDT rounds           Nutrition Monitoring and Evaluation   Food/Nutrient Related History Monitoring  Monitoring and Evaluation Plan: Energy intake  Energy Intake: Estimated energy intake  Criteria: >75% est needs via PO diet              Nutrition Focused Physical Findings  Monitoring and Evaluation Plan: Muscles, Adipose  Adipose: Loss of subcutaneous fat  Criteria: minimize loss  Muscles: Muscle atrophy  Criteria: minimize loss       Time Spent/Follow-up Reminder:   Time Spent (min): 60 minutes  Last Date of Nutrition Visit: 02/28/24  Nutrition Follow-Up Needed?: Dietitian to reassess per policy  Follow up Comment: pending ARIK del cid
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
## 2024-02-28 NOTE — SIGNIFICANT EVENT
ENT consulted for management of epistaxis. Overnight, a full consult note can be found in the incomplete note section. A signed consult note will be available after the patient has been staffed later today.    History of Present Illness  The patient is a 33 y.o. female with pmhx stage D HFrEF (PPCM) s/p ICD s/p CardioMEMs, hypothyroidism 2/2 thyroidectomy, obesity s/p gastric bypass (2017), and SLE who is s/p OHT (3/31/2022) with a post-OHT course complicated by RIJ/RUE DVTs, leukopenia, left groin seroma, and asymptomatic 2R ACR (11/2022) treated with steroids, s/p total hysterectomy (2023), Flu A (1/2024) who presented to [facility] on [facility]/15/2024 for  acute HF iso acute heart transplant rejection, intubated 2/2 pulm edema, extubated 2/24, currently on VA ECMO, but anticoagulation has been held ~48 hours 2/2 drop in Hb of uknown source. Overnight, had a DHT pulled from R nostril (team unsure) and then started having clots from her OP. Protecting airway well, just intermittently spitting some clots.      ENT was consulted for epistaxis    ----------------------------------------------------------------------------    Procedure Note: Control of Epistaxis  Verbal informed consent was obtained from the patient/patient's guardian. Examination of the nose revealed somewhat brisk bleeding from the right anterior septum. All blood clots were suctioned first from the oropharynx and then from the nose. Afrin was sprayed liberally into both nasal cavities and pressure was held for 15 minutes. First, surgicel was layered over the area of bleeding.  This was/was not successful. Next, afrin was sprayed into the R naris and pressure was held for 15 minutes to ensure hemostasis. The patient tolerated the procedure well.        Assessment  Charla Bowles is a 33 y.o. female who is admitted since 2/15 for acute HF iso acute heart transplant rejection, intubated 2/2 pulm edema, extubated 2/24, currently on VA ECMO, but  anticoagulation has been held ~48 hours 2/2 drop in Hb of uknown source. Overnight, had a DHT pulled from R nostril and then started having clots from her oropharynx. Protecting airway well, just intermittently spitting some clots. On eval, somewhat brisk bleeding visualized from right anterior septum. Controlled by layering surgicel over the area of bleeding, spraying afrin, and holding pressure. Based on the rate of bleeding, patient has probably swallowed a large amount of blood.     Recommendations   - Bleeding visualized from anterior right nasal septum. Spraying afrin and holding direct pressure should work well to slow / stop her bleeding should it continue.  -Afrin 3x daily to both nares for 3 days  -If patient has bleeding spray afrin liberally into both nares and hold pressure for 15 minutes, repeat this 3x. If there is continued bleeding, then please call ENT.   -Nasal saline 5 sprays 4x daily for 10 days  -Starting 2/28 PM, can start using mupirocin ointment or nasal ayr saline gel on bilateral anterior nasal septum  -BP control  -Patient will be staffed with an attending on 2/28  - full consult note overnight can be found in incomplete note section, final signed consult note will be available after the patient has been staffed.

## 2024-02-28 NOTE — CARE PLAN
CThe patient's goals for the shift include  Continue to titrate Cleveprex for MAP 75-85    The clinical goals for the shift include Fluid resuscitate with blood and IV fluid to prepare for possible ECMO decannulation.    Over the shift, the patient did not make progress toward the following goals. Awaiting decision for ECMO decannulation..

## 2024-02-28 NOTE — PROGRESS NOTES
HFICU Attending Note    This critically ill patient continues to be at-risk for clinically significant deterioration / failure due to the above mentioned dysfunctional, unstable organ systems.  I have personally identified and managed all complex critical care issues to prevent aforementioned clinical deterioration.  Critical care time is spent at bedside and/or the immediate area and has included, but is not limited to, the review of diagnostic tests, labs, radiographs, serial assessments of hemodynamics, respiratory status, ventilatory management, and family updates.  Time spent in procedures and teaching are reported separately.        Ms. 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     Originally presented to [facility] ED on 2/15/24 with complaints of N/V x 3 days and inability to keep medications down. POCUS revealed acute systolic heart failure w/ severe BIV dysfunction when compared to previous images in 11/2023. Also noted to have HIRAM requiring CVVH and transaminitis. Immunosuppression notably below therapeutic range. Admitted to HFICU and treated for suspected acute cellular vs. acute antibody mediated rejection; however, cardiac biopsy negative for signs of rejection. Despite rejection therapy, inotropes and MCS via IABP (2/18/24), the patient's end organ function continued to worsen without improvement in cardiac function. Ultimately placed on left femoral VA ECMO w/ Dr. Marina on 2/19/2024 and transferred to CTICU.      CTICU course complicated by anemia requiring blood product transfusions, volume overload & intubation (tachypnea and increased work of breathing 2/2 pulmonary edema).  Status post extubation on 2/24. Now showing renal off CVVH and cardiac allograft recovery.         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

## 2024-02-28 NOTE — PROGRESS NOTES
Charla Bowles is a 33 y.o. female on day 13 of admission presenting with Cardiogenic shock (CMS/HCC).    Patient requires ECMO support. Drainage cannula site, cannula, pump, oxygenator, return cannula and return cannula site clinically inspected. RPM and sweep reviewed and adjusted appropriate to clinical context. Anticoagulation status and intensity discussed. Plan for weaning, next clinical steps discussed with team and family. Discussed with cardiothoracic surgeon, perfusion, palliative care and physical/occupational therapy    ECMO Indication: ACR complicated heart transplant  ECMO Cannula Configuration: Left Fem Fem VA  ECMO circuit run from drainage cannula to pump to oxygenator to return cannula: Yes  Presence of cannula bleeding: No  Presence of oxygenator clot: No  Pre/post PaO2 adequate: Yes  LV Unloading/Pulse Pressure: IABP 1:1, 40 mmhg pulse pressure  Distal Perfusion: Yes  Anticoagulation Plan/Monitoring: ASA/SQH, no systemic heparin due to bleeding  Mobility Plan/Candidacy: Candidate, though plan for decann today  Path to decannulation/anticipated decannulation date:Today  ECMO:     Most Recent Range Past 24hrs   Flow 3.11 Patient Flow (L/min)  Min: 2.91  Max: 3.3   Speed 3000 Circuit Flow (RPM)  Min: 3000  Max: 3200   Sweep 0.5 Sweep Gas Flow Rate (L/min)  Min: 0.5  Max: 0.5       clevidipine, 1-16 mg/hr, Last Rate: 4 mg/hr (02/28/24 0700)  EPINEPHrine, 0.02 mcg/kg/min (Dosing Weight), Last Rate: 0.02 mcg/kg/min (02/28/24 0700)  lactated Ringer's, 5 mL/hr, Last Rate: Stopped (02/27/24 2245)  lactated Ringer's, 10 mL/hr, Last Rate: 10 mL/hr (02/28/24 0700)                         Demetrio Murrell MD

## 2024-02-28 NOTE — PROGRESS NOTES
CTICU Progress Note  Charla Bowles/[id number]    Admit Date: 2/15/2024  Hospital Length of Stay: 13   ICU Length of Stay: 8d 19h   CT SURGEON:     SUBJECTIVE:   Significant and persistent epistaxis requiring ENT consult and surgicel packing, no other acute events    MEDICATIONS  Infusions:  clevidipine, Last Rate: 4 mg/hr (02/28/24 0800)  EPINEPHrine, Last Rate: 0.02 mcg/kg/min (02/28/24 0800)  lactated Ringer's, Last Rate: 5 mL/hr (02/28/24 0800)  lactated Ringer's, Last Rate: 10 mL/hr (02/28/24 0800)      Scheduled:  acetaminophen, 975 mg, q6h  aspirin, 81 mg, Daily  calcitriol, 0.5 mcg, Daily  ferrous sulfate (325 mg ferrous sulfate), 65 mg of iron, Daily with breakfast  heparin (porcine), 5,000 Units, q8h  hydrALAZINE, 25 mg, TID  insulin regular, 0-10 Units, Before meals & nightly  insulin regular, 3 Units, TID with meals  levothyroxine, 200 mcg, Daily  lidocaine, 1 patch, Daily  melatonin, 10 mg, Nightly  multivitamin with minerals, 1 tablet, Daily  nystatin, 5 mL, q6h  oxymetazoline, 2 spray, TID  pantoprazole, 40 mg, Daily before breakfast  polyethylene glycol, 17 g, BID  predniSONE, 10 mg, Daily  rosuvastatin, 40 mg, Nightly  sennosides-docusate sodium, 1 tablet, Daily  sirolimus, 2 mg, Daily  sodium chloride, 5 spray, 4x daily  [Held by provider] sulfamethoxazole-trimethoprim, 80 mg of trimethoprim, Daily  tacrolimus, 4 mg, q12h TRICIA  traZODone, 50 mg, Nightly  [Held by provider] valGANciclovir, 450 mg, q48h      PRN:  calcium gluconate, 1 g, q6h PRN  calcium gluconate, 2 g, q6h PRN  dextrose, 25 g, q15 min PRN  dextrose, 25 g, q15 min PRN   Or  glucagon, 1 mg, q15 min PRN  glucagon, 1 mg, q15 min PRN  hydrALAZINE, 10 mg, q4h PRN  artificial tears, 2 drop, PRN  magnesium sulfate, 1 g, q6h PRN  magnesium sulfate, 2 g, q6h PRN  mupirocin, , BID PRN  oxyCODONE, 10 mg, q4h PRN  oxyCODONE, 5 mg, q4h PRN  potassium chloride, 40 mEq, q6h PRN  potassium chloride CR, 20 mEq, q6h PRN        PHYSICAL EXAM:  "  Visit Vitals  BP 95/65   Pulse (!) 127   Temp 37.2 °C (99 °F) (Core)   Resp 12   Ht 1.549 m (5' 0.98\")   Wt 91.3 kg (201 lb 4.5 oz)   SpO2 100%   BMI 38.05 kg/m²   OB Status Hysterectomy   Smoking Status Never   BSA 1.98 m²     Wt Readings from Last 5 Encounters:   02/27/24 91.3 kg (201 lb 4.5 oz)   12/07/23 92.1 kg (203 lb)   12/01/23 93 kg (205 lb)   11/29/23 92.9 kg (204 lb 12.8 oz)   11/09/23 91.3 kg (201 lb 3.2 oz)     INTAKE/OUTPUT:  I/O last 3 completed shifts:  In: 1417.6 (15.5 mL/kg) [I.V.:967.6 (10.6 mL/kg); Blood:100; IV Piggyback:350]  Out: 6140 (67.3 mL/kg) [Urine:5810 (1.8 mL/kg/hr); Other:330]  Weight: 91.3 kg      Physical Exam:   - CONSTITUTION: critically ill, young appearing female on ECMO and IABP   - NEUROLOGIC: Alert and oriented, CAM negative. Moving all extremities with no focal deficits  - CARDIOVASCULAR: ST. On VA ECMO left femoral vein and artery with distal perfusion catheter. Right femoral IABP 1:1 with appropriate augmentation. No hematoma or oozing noted.  - RESPIRATORY: RA and CTAB  - : Singh with clear, yellow urine.   - GI: Soft/ND/NT. Active BS.  - EXTREMITIES:  Right fem IABP, Left fem VA ECMO, small hematoma, warm extremity down to ankle, left foot cool, dopplerable.  NV exam intact x 4, no edema on exam.  - SKIN: WDI  - PSYCHIATRIC: Calm, appropriate    Daily Risk Screen  Central line: required- hemodynamic monitoring, parenteral medications  Singh: required, accurate I/O in critically ill    Invasive Hemodynamics:    Most Recent Range Past 24hrs   BP (Art) 97/66 Arterial Line BP 1  Min: 80/57  Max: 122/63   MAP(Art) 81 mmHg Arterial Line MAP 1 (mmHg)   Min: 67 mmHg  Max: 89 mmHg   RA/CVP   No data recorded   PA 13/8 PAP  Min: 6/2  Max: 29/20   PA(mean) 13 mmHg PAP (Mean)  Min: 4 mmHg  Max: 24 mmHg   CO 5.7 L/min No data recorded   CI 2.9 L/min/m2 No data recorded   Mixed Venous 62 % SVO2 (%)  Min: 55.7 %  Max: 74 %   SVR  758 (dyne*sec)/cm5 No data recorded     ECMO:     " Most Recent Range Past 24hrs   Flow 3.34 Patient Flow (L/min)  Min: 2.97  Max: 3.34   Speed 2980 Circuit Flow (RPM)  Min: 2980  Max: 3000   Sweep 0.5 Sweep Gas Flow Rate (L/min)  Min: 0.5  Max: 0.5      All relevant imaging, diagnostics and labwork reviewed.    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 She was admitted to HFICU on 2/15 with concern for cardiogenic shock secondary to allograft rejection and decompensated heart failure with multiorgan dysfunction including significant elevation of liver enzymes and nonoliguric acute kidney injury. Endomyocardial biopsy on 2/16 revealed mild ACR with +CD4s and negative HLAs, however multisystem organ failure persisted with increased inotropic requirements. IABP placed on 2/18. Transferred to CTICU on 2/19 for VA ECMO cannulation with Dr. Marina for persistent multi-organ system dysfunction. Intubated 2/21 for respiratory failure 2/2 pulmonary edema, extubated 2/24.       Plan:  NEURO: No history. Neuro intact and CAM negative. Acute postoperative pain adequately controlled on current regimen.  Poor sleep overnight but more likely 2/2 significant epistaxis.  -->  - Serial neuro and pain assessments  - change Tylenol to scheduled 975 mg q6  - continue oxy 5-10 mg q4 PRN for mod-severe pain and dc Dilaudid   - PT/OT Consult  - change PRN melatonin to scheduled 10 mg nightly for sleep, Trazodone 50 mg PRN for persisting insomnia  - CAM ICU score qshift. Sleep/wake cycle hygiene    ENT: Pt with signficant epistaxisis overnight 2/28 requiring ENT consult ~ R anterior nare packed with surgicel with resolution.  - Afrin 3 x daily to bilateral nares x 3 days  - nasal saline spray 5 sprays bilaterally x 10 days  - PRN mupirocin ointment to bilateral anterior nasal septum PRN     CV: Patient has a  history of heart transplant in March of 2022 with TTE on 11/29 with LVEF 60-65%. Admitted for cardiogenic shock with concern for acute cellular rejection s/p IABP placement (R fem) 2/18 and VA ECMO (left fem) 2/19. ECHO 2/20 with persisting severe BiV dysfunction despite negative biopsy 2/20.  Echo with turn-down 2/22 EF 10%, severely reduced RV, mild AR and mild-moderate MR, Repeat turndown (0.8L) TTE 2/27 with LVEF 30% and moderate RV.   2/28 TTE with EF 30% and reduced RV (on 0L flow).  Cardiogenic shock persists but improving, remains on ECMO 3.1L flow/100%FiO2/sweep 0.5 and IABP 1:1 for mechanical support and epinephrine 0.02 mcg/kg/min for inotropic support.  SPA 9-10's and CVP 0-3.  Remains -130. Running hypertensive requiring clevidipine gtt but improving with addition of scheduled hydral. -->  - Maintain goal MAP 70-90  - continue full ECMO support with at minimum 3L, as pt off anticoagulation with daily pre/post gases/LDH, NV checks, and dressings per protocol  - Maintain IABP 1:1 until weaned off ECMO  - Continue epi 0.02mcg/kg/min for inotropic support while weaning mechanical support  - continue clevidipine gtt for goal MAP < 90  - increase PO hydral to 25 mg q8 and continue PRN hydral 10 mg for MAP >90 to attempt clevidipine wean  - continue home rosuvastatin 40mg daily  - resume ASA 81 mg daily  - Hold home amlodipine  - awaiting surgical team decision surrounding ECMO decannulation timing, +/- impella insertion     VASC: Vascular surgery previously following for diminished pulses in LLE.  Now palpable DP bilaterally, grossly intact NV exam to BLE, and feet warm, absent LLE PT which is unchanged.   - Vascular surgery signed off     PULM: No history. Acute respiratory failure now resolved, intubated 2/21-2/24.  On RA with appropriate oxygenation.  CXR with minimal pulmonary edema. -->  - Daily CXR  - Maintain SPO2 > 92%  - Adjust sweep for normal pH     GI: History of gastric bypass and MMF  colitis.  Passed beside swallow eval and tolerating PO, currently NPO for possible OR today.  Mild transaminitis downtrending.   Previous liquid BM's, now resolved with cessation of TF.  Last BM 2/26. -->  - Continue home PPI, calcitriol 0.5mg daily, multivitamin, calcium carbonate 1,250mg BID  - NPO pending possible OR today, resume carb controlled diet when able  - continue miralax BID and resume senna-colace but just daily for now     : No history of renal disease, baseline creatinine 1.2-1.3. Oliguric HIRAM previously requiring CVVH, likely in setting of cardiorenal physiology. Renal US from 2/19 unremarkable.  Cr remains slightly elevated but UOP acceptable~ likely 2/2 hypovolemia given significant autodiuresis over the past 24 hours. Mild hyponatremia stable, hypocalcemia replenished.  UOP /hr, net -3.4L. Appears euvolemic to hypovolemic on exam with globally low filling pressures. -->  - RFP as clinically indicated, replete electrolytes per CTICU protocol  - give 50g 25% albumin and 500 NS given hypovolemia  - holding off on IVP Lasix PRN unless UOP begins to taper off or trending significantly positive  - leave wheatley for now given uptrending Cr and potential decannulation today to evaluate end organ perfusion     ENDO: History of hypothyroidism TSH 12.02, free thyroxine 2.19 (Endo previously consulted but now signed off). A1c: 6.3. Intermittent hyperglycemia but overall slowly improving. -->  - Maintain BG <180 with hypoglycemia protocol  - increase regular SSI to #2 ACHS, will continue to increase SSI daily with goal to wean prandial   - continue scheduled regular insulin 3u, holding with NPO status  - Continue Synthroid 200mcg daily  - Follow up with primary Endo as outpatient per inpatient Endo recs for thyroid monitoring     HEME: History of remote DVT. Acute on chronic iron deficiency anemia. Acute blood loss anemia, previously requiring serial transfusions while on systemic AC but hgb now grossly  stable without active s/s of bleeding.  Hgb did drift overnight to 7.3, attributed to epistaxis  Stable thrombocytopenia. PF4 2/21 negative.  LDH and pre/post oxygenator gases acceptable. -->    - CBC as clinically indicated  - Holding systemic ECMO AC due to previous bleeding  - Continue daily ferrous sulfate  - SCDs and SQH for DVT prophylaxis  - Last type and screen: 2/25, reordered  - 4/4/2 placed on hold for potential OR  - give 1 uPRBC given hypovolemia and hgb drift     Rejection/prophylaxis: S/p heart transplant 3/31/2022. Induction basiliximab. Donor HCV -, toxo -/-, CMV -/+. Mild ACR with +CD4 and negative HLAs however clinical presentation concern for AMR rejection.  PLEX/IVIG 2/17, 2/20. Thymo 2/18, 2/19. Repeat biopsy 2/20 with no evidence of on going rejection (ACR 0R, pAMR0 and no C3D or C4D).  - Completed steroid taper, continue prednisone 10mg daily  - Continue tacrolimus 4 mg BID with goal level 3-5  - continue sirolimus 2 mg daily with goal level 7-9  - Plan for no further PLEX/IVIG or thymo  - continue Nystatin suspension 500,000 units q6hr  - Hold bactrim and valcyte in setting of thrombocytopenia per Transplant  - CMV PCR ordered for Friday 3/1 per Transplant     ID: Received Zosyn and Vancomycin on 2/15 in ED. Blood cultures from 2/15 negative. No leukocytosis and afebrile. -->  - Trend temp q4h     Skin: No active skin issues. All ECMO/IABP dressings CDI.  - Preventative Mepilex dressings in place on sacrum and heels  - Change preventative Mepilex weekly or more frequently as indicated (when moist/soiled)   - Every shift skin assessment per nursing and weekly ICU skin rounds  - Moisture barrier to be applied with christopher care  - Active skin problems addressed with nursing on daily rounds     Proph:  SCDs  SQH  Home PPI     G: Line  Right radial arterial line placed 2/16  RIJ introducer with PAC placed 2/16  LIF Trialysis placed 2/17  Right femoral IABP placed 2/18  8F Left femoral reperfusion  cannula placed 2/19  Left femoral 17F arterial ECMO cannula placed 2/19  Left femoral 25F venous ECMO cannula placed 2/19    Will discuss line that need exchanged/removed as appropriate once operative plan finalized.     F: Family: Mom updated at bedside yesterday    Restraints: Not indicated    Code status: Full Code     A,B,C,D,E,F,: reviewed    Dispo: CTICU    I personally spent 55 minutes of critical care time directly and personally managing the patient exclusive of separately billable procedures.    Keara Eller, APRN-CNP  CTICU [id number]
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
## 2024-02-28 NOTE — PROGRESS NOTES
Subjective     Pt on regular diet and ensure high protein, currently NPO today  Minimal insulin needed    Review of Systems   Constitutional:  Positive for appetite change and fatigue. Negative for activity change, diaphoresis and unexpected weight change.   HENT:  Negative for congestion, sore throat and trouble swallowing.    Eyes:  Negative for pain, redness and visual disturbance.   Respiratory:  Negative for cough, chest tightness and shortness of breath.    Cardiovascular:  Negative for chest pain and palpitations.   Gastrointestinal:  Negative for abdominal pain, diarrhea, nausea and vomiting.   Endocrine: Negative for cold intolerance, heat intolerance, polydipsia, polyphagia and polyuria.   Genitourinary:  Negative for dysuria, frequency and urgency.   Musculoskeletal:  Negative for gait problem and joint swelling.   Skin:  Negative for pallor and rash.   Allergic/Immunologic: Negative for immunocompromised state.   Neurological:  Negative for dizziness, light-headedness, numbness and headaches.   Hematological:  Does not bruise/bleed easily.   Psychiatric/Behavioral:  Negative for agitation, behavioral problems and confusion.    All other systems reviewed and are negative.      Physical Exam  Vitals reviewed.   Constitutional:       General: She is not in acute distress.     Appearance: Normal appearance. She is ill-appearing.   HENT:      Head: Normocephalic and atraumatic.      Nose: Nose normal.      Mouth/Throat:      Mouth: Mucous membranes are moist.   Eyes:      Extraocular Movements: Extraocular movements intact.      Conjunctiva/sclera: Conjunctivae normal.      Pupils: Pupils are equal, round, and reactive to light.   Cardiovascular:      Pulses: Normal pulses.      Comments: On ECMO  Pulmonary:      Effort: Pulmonary effort is normal. No respiratory distress.   Abdominal:      General: Abdomen is flat. There is no distension.   Musculoskeletal:         General: Normal range of motion.   Skin:     " General: Skin is warm and dry.      Findings: No rash.   Neurological:      Mental Status: She is alert and oriented to person, place, and time.   Psychiatric:         Mood and Affect: Mood normal.         Behavior: Behavior normal.           Last Recorded Vitals  Blood pressure 99/63, pulse (!) 121, temperature 37.2 °C (99 °F), temperature source Core, resp. rate 22, height 1.549 m (5' 0.98\"), weight 91.3 kg (201 lb 4.5 oz), SpO2 100 %.  99}  Lab Results   Component Value Date    HGBA1C 6.3 (H) 02/16/2024    HGBA1C 5.7 (A) 03/17/2023    HGBA1C 6.4 (A) 12/14/2022     (L) 02/28/2024    K 3.5 02/28/2024    CL 92 (L) 02/28/2024    CO2 28 02/28/2024    BUN 76 (H) 02/28/2024    CREATININE 2.40 (H) 02/28/2024    CALCIUM 8.9 02/28/2024    ALBUMIN 3.9 02/28/2024    PROT 6.2 (L) 02/28/2024    BILITOT 0.5 02/28/2024    ALKPHOS 44 02/28/2024    ALT 68 (H) 02/28/2024    AST 48 (H) 02/28/2024    GLUCOSE 168 (H) 02/28/2024    CHOL 162 02/16/2024    TRIG 142 02/16/2024    HDL 39.2 02/16/2024      Assessment and plan    PreDM2 with solumedrol induced hyperglycemia in setting of heart allograft rejection     History Of Present Illness  Brienjackelyn Bowles is a 33 y.o. female presenting with PMHx of stage D HFrEF (PPCM) s/p ICD s/p CardioMEMs, hypothyroidism 2/2 thyroidectomy on replacement therapy, obesity s/p gastric bypass (2017), and SLE who is s/p OHT (3/31/2022) with a post-OHT course complicated by RIJ/RUE DVTs, leukopenia, left groin seroma, worsening renal function, asymptomatic 2R ACR (11/2022) treated with steroids, and thrush who presented to the [facility] ED for nausea and vomiting. Given patient's severely reduced EF patient was started on a milrinone drip and levophed was also started due to hypotension. CXR was obtained which showed an enlarged cardiac silhouette. Pt. Was admitted to the HFICU for cardiogenic shock and concern for acute rejection.    Resumed home medications and pulse dose steroids x3 days as " solumedrol 1g q24. PO steroid taper TBD by heart biopsy results. Likely pred 40-60mg starting.      Diabetes history  -A1C historically remains <6.5% in preDM range   - pt did experience steroid induced hyperglycemia at time of her initial OHT in 3/2022  - on no glucose control meds at home        PLAN:     2/15: start solumedrol 1g pulse q24hr x3 days  2/18: start prednisone 60mg PO  2/19: pred 50mg  2/20: pred 40mg  2/21: pred 30mg  2/22: pred 20mg  2/23: pred 10mg    pt is NPO, however usually on regular diet with ensure high protein       Plan   - Goal BG <180  - Regular insulin 3 units TIDAC  - Regular insulin #  1 sliding scale TIDAC (q6h if NPO)  -Accuchecks q6h  -Hypoglycemia protocol    endo will follow peripherally at this time due to patient's minimal insulin requirements and euylgycemia. please feel free to contact us with any questions and prior to discharge for formal homegoing recs.        Dispo:  pt may MDI insulin at discharge to address her prednisone taper - exact dose TBD by titration.  SGLT2i tx would benefit both CHF and CKD once tacrolimus levels are returned to maintenance regimen - likely 4-6 months after this allograft rejection episode.     - pt's  Endocrinologist moved and she will need to re-establish endocrine care for hypothyroidism and follow up on steroid induced hyperglycemia, appointment requested for post transplant clinic with Elisabeth Acevedo PA-C in [place] 1800 on 4/22/24     I spent 25 mins on care and coordination of this pt      Sobeida Poe PA-C

## 2024-02-29 NOTE — CARE PLAN
The patient's goals for the shift include  Pt to return to OR for ECMO decannulation    The clinical goals for the shift include: pt will be HD stable after ECMO decannulation maintain adequate oxygenation/perfusion, pain will be controlled with current pain meds.

## 2024-02-29 NOTE — PROGRESS NOTES
[facility]  ADVANCED HEART FAILURE AND TRANSPLANT CARDIOLOGY     Charla Bowles is a 33 y.o. female on day 9 of admission presenting with Cardiogenic shock (CMS/HCC).    INTERVAL EVENTS / PERTINENT ROS:    No acute events overnight.     Objective     Physical Exam  Constitutional:       General: She is not in acute distress.     Appearance: She is obese.   HENT:      Head: Normocephalic.      Comments: +Seagraves-Estevan catheter on right internal jugular, Trialysis line on left IJ     Mouth/Throat:      Mouth: Mucous membranes are moist.   Eyes:      Pupils: Pupils are equal, round, and reactive to light.   Cardiovascular:      Rate and Rhythm: Regular rhythm. Tachycardia present.      Pulses: Normal pulses.      Heart sounds: Normal heart sounds. No murmur heard.     No friction rub. No gallop.      Comments: Left femoral VA ECMO flowing ~3.0L/ FIO2 100%/sweep 0.5L/m. Right femoral IABP in place set 1:1 and augmenting appropriately.   Pulmonary:      Effort: Pulmonary effort is normal. No accessory muscle usage or retractions.      Breath sounds: No wheezing, rhonchi or rales.      Comments: Breathing comfortable on nasal cannula. Equal chest rise bilaterally.   Abdominal:      General: Abdomen is flat. Bowel sounds are normal. There is no distension.      Palpations: Abdomen is soft. There is no mass.      Tenderness: There is no abdominal tenderness. There is no guarding.      Hernia: No hernia is present.   Musculoskeletal:      Cervical back: Full passive range of motion without pain.   Skin:     General: Skin is warm and dry.      Capillary Refill: Capillary refill takes less than 2 seconds.      Coloration: Skin is not jaundiced.      Findings: No laceration, rash or wound.   Neurological:      General: No focal deficit present.      Mental Status: She is alert.      Comments: Awake, alert, oriented x3, following commands appropriately.   Psychiatric:         Mood and Affect: Mood  "normal.         Behavior: Behavior normal.       Last Recorded Vitals  Blood pressure 102/72, pulse (!) 122, temperature 37 °C (98.6 °F), temperature source Core, resp. rate 18, height 1.549 m (5' 0.98\"), weight 91.3 kg (201 lb 4.5 oz), SpO2 100 %.  Intake/Output last 3 Shifts:  I/O last 3 completed shifts:  In: 3763.5 (41.2 mL/kg) [P.O.:400; I.V.:1163.5 (12.7 mL/kg); Blood:900; IV Piggyback:1300]  Out: 5175 (56.7 mL/kg) [Urine:5175 (1.6 mL/kg/hr)]  Weight: 91.3 kg     Relevant Results  Scheduled medications  acetaminophen, 975 mg, oral, q6h  albumin human, 25 g, intravenous, q6h  aspirin, 81 mg, oral, Daily  calcitriol, 0.5 mcg, oral, Daily  ferrous sulfate (325 mg ferrous sulfate), 65 mg of iron, oral, Daily with breakfast  heparin (porcine), 5,000 Units, subcutaneous, q8h  hydrALAZINE, 25 mg, oral, TID  insulin regular, 0-15 Units, subcutaneous, Before meals & nightly  insulin regular, 3 Units, subcutaneous, TID with meals  levothyroxine, 200 mcg, oral, Daily  lidocaine, 1 patch, transdermal, Daily  melatonin, 10 mg, oral, Nightly  multivitamin with minerals, 1 tablet, oral, Daily  nystatin, 5 mL, Swish & Swallow, q6h  oxymetazoline, 2 spray, Each Nostril, TID  pantoprazole, 40 mg, oral, Daily before breakfast  polyethylene glycol, 17 g, oral, BID  predniSONE, 10 mg, oral, Daily  rosuvastatin, 40 mg, oral, Nightly  sennosides-docusate sodium, 1 tablet, oral, Daily  sirolimus, 2.5 mg, oral, Daily  sodium chloride, 5 spray, Each Nostril, 4x daily  [Held by provider] sulfamethoxazole-trimethoprim, 80 mg of trimethoprim, oral, Daily  tacrolimus, 4 mg, oral, q12h TRICIA  [Held by provider] valGANciclovir, 450 mg, oral, q48h      Continuous medications  clevidipine, 1-16 mg/hr, Last Rate: 4 mg/hr (02/29/24 0755)  EPINEPHrine, 0.02 mcg/kg/min (Dosing Weight), Last Rate: 0.02 mcg/kg/min (02/29/24 0755)  lactated Ringer's, 5 mL/hr, Last Rate: 5 mL/hr (02/29/24 0755)  lactated Ringer's, 10 mL/hr, Last Rate: 10 mL/hr (02/29/24 " 0755)      PRN medications  PRN medications: calcium gluconate, calcium gluconate, dextrose, dextrose **OR** glucagon, glucagon, hydrALAZINE, artificial tears, magnesium sulfate, magnesium sulfate, mupirocin, oxyCODONE, oxyCODONE, potassium chloride, potassium chloride CR **OR** [DISCONTINUED] potassium chloride, traZODone    Results for orders placed or performed during the hospital encounter of 02/15/24 (from the past 24 hour(s))   POCT GLUCOSE   Result Value Ref Range    POCT Glucose 181 (H) 74 - 99 mg/dL   Magnesium   Result Value Ref Range    Magnesium 2.50 (H) 1.60 - 2.40 mg/dL   Renal function panel   Result Value Ref Range    Glucose 155 (H) 74 - 99 mg/dL    Sodium 135 (L) 136 - 145 mmol/L    Potassium 3.5 3.5 - 5.3 mmol/L    Chloride 93 (L) 98 - 107 mmol/L    Bicarbonate 27 21 - 32 mmol/L    Anion Gap 19 10 - 20 mmol/L    Urea Nitrogen 76 (H) 6 - 23 mg/dL    Creatinine 2.13 (H) 0.50 - 1.05 mg/dL    eGFR 31 (L) >60 mL/min/1.73m*2    Calcium 9.1 8.6 - 10.6 mg/dL    Phosphorus 3.7 2.5 - 4.9 mg/dL    Albumin 4.8 3.4 - 5.0 g/dL   CBC   Result Value Ref Range    WBC 11.8 (H) 4.4 - 11.3 x10*3/uL    nRBC 0.0 0.0 - 0.0 /100 WBCs    RBC 2.59 (L) 4.00 - 5.20 x10*6/uL    Hemoglobin 7.6 (L) 12.0 - 16.0 g/dL    Hematocrit 22.3 (L) 36.0 - 46.0 %    MCV 86 80 - 100 fL    MCH 29.3 26.0 - 34.0 pg    MCHC 34.1 32.0 - 36.0 g/dL    RDW 14.1 11.5 - 14.5 %    Platelets 69 (L) 150 - 450 x10*3/uL   Calcium, Ionized   Result Value Ref Range    POCT Calcium, Ionized 1.10 1.1 - 1.33 mmol/L   POCT GLUCOSE   Result Value Ref Range    POCT Glucose 139 (H) 74 - 99 mg/dL   Blood Gas Arterial Full Panel   Result Value Ref Range    POCT pH, Arterial 7.45 (H) 7.38 - 7.42 pH    POCT pCO2, Arterial 41 38 - 42 mm Hg    POCT pO2, Arterial 105 (H) 85 - 95 mm Hg    POCT SO2, Arterial 98 94 - 100 %    POCT Oxy Hemoglobin, Arterial 96.8 94.0 - 98.0 %    POCT Hematocrit Calculated, Arterial 26.0 (L) 36.0 - 46.0 %    POCT Sodium, Arterial 133 (L) 136 -  145 mmol/L    POCT Potassium, Arterial 4.6 3.5 - 5.3 mmol/L    POCT Chloride, Arterial 98 98 - 107 mmol/L    POCT Ionized Calcium, Arterial 1.14 1.10 - 1.33 mmol/L    POCT Glucose, Arterial 194 (H) 74 - 99 mg/dL    POCT Lactate, Arterial 0.5 0.4 - 2.0 mmol/L    POCT Base Excess, Arterial 4.1 (H) -2.0 - 3.0 mmol/L    POCT HCO3 Calculated, Arterial 28.5 (H) 22.0 - 26.0 mmol/L    POCT Hemoglobin, Arterial 8.8 (L) 12.0 - 16.0 g/dL    POCT Anion Gap, Arterial 11 10 - 25 mmo/L    Patient Temperature 37.0 degrees Celsius    FiO2 21 %   CBC   Result Value Ref Range    WBC 11.3 4.4 - 11.3 x10*3/uL    nRBC 0.0 0.0 - 0.0 /100 WBCs    RBC 3.09 (L) 4.00 - 5.20 x10*6/uL    Hemoglobin 8.8 (L) 12.0 - 16.0 g/dL    Hematocrit 27.0 (L) 36.0 - 46.0 %    MCV 87 80 - 100 fL    MCH 28.5 26.0 - 34.0 pg    MCHC 32.6 32.0 - 36.0 g/dL    RDW 14.5 11.5 - 14.5 %    Platelets 73 (L) 150 - 450 x10*3/uL   Lactate Dehydrogenase   Result Value Ref Range     (H) 84 - 246 U/L   Hepatic function panel   Result Value Ref Range    Albumin 4.3 3.4 - 5.0 g/dL    Bilirubin, Total 0.6 0.0 - 1.2 mg/dL    Bilirubin, Direct 0.1 0.0 - 0.3 mg/dL    Alkaline Phosphatase 37 33 - 110 U/L    ALT 46 (H) 7 - 45 U/L    AST 32 9 - 39 U/L    Total Protein 6.3 (L) 6.4 - 8.2 g/dL   Coagulation Screen   Result Value Ref Range    Protime 10.7 9.8 - 12.8 seconds    INR 1.0 0.9 - 1.1    aPTT 25 (L) 27 - 38 seconds   Fibrinogen   Result Value Ref Range    Fibrinogen 266 200 - 400 mg/dL   Calcium, Ionized   Result Value Ref Range    POCT Calcium, Ionized 1.13 1.1 - 1.33 mmol/L   CBC   Result Value Ref Range    WBC 8.9 4.4 - 11.3 x10*3/uL    nRBC 0.0 0.0 - 0.0 /100 WBCs    RBC 2.84 (L) 4.00 - 5.20 x10*6/uL    Hemoglobin 8.4 (L) 12.0 - 16.0 g/dL    Hematocrit 24.9 (L) 36.0 - 46.0 %    MCV 88 80 - 100 fL    MCH 29.6 26.0 - 34.0 pg    MCHC 33.7 32.0 - 36.0 g/dL    RDW 14.7 (H) 11.5 - 14.5 %    Platelets 72 (L) 150 - 450 x10*3/uL   Magnesium   Result Value Ref Range     Magnesium 2.42 (H) 1.60 - 2.40 mg/dL   Basic Metabolic Panel   Result Value Ref Range    Glucose 114 (H) 74 - 99 mg/dL    Sodium 137 136 - 145 mmol/L    Potassium 3.4 (L) 3.5 - 5.3 mmol/L    Chloride 96 (L) 98 - 107 mmol/L    Bicarbonate 27 21 - 32 mmol/L    Anion Gap 17 10 - 20 mmol/L    Urea Nitrogen 76 (H) 6 - 23 mg/dL    Creatinine 2.19 (H) 0.50 - 1.05 mg/dL    eGFR 30 (L) >60 mL/min/1.73m*2    Calcium 9.0 8.6 - 10.6 mg/dL   Phosphorus   Result Value Ref Range    Phosphorus 3.8 2.5 - 4.9 mg/dL   Blood Gas Arterial Full Panel   Result Value Ref Range    POCT pH, Arterial 7.44 (H) 7.38 - 7.42 pH    POCT pCO2, Arterial 41 38 - 42 mm Hg    POCT pO2, Arterial 108 (H) 85 - 95 mm Hg    POCT SO2, Arterial 98 94 - 100 %    POCT Oxy Hemoglobin, Arterial 96.5 94.0 - 98.0 %    POCT Hematocrit Calculated, Arterial 26.0 (L) 36.0 - 46.0 %    POCT Sodium, Arterial 134 (L) 136 - 145 mmol/L    POCT Potassium, Arterial 3.4 (L) 3.5 - 5.3 mmol/L    POCT Chloride, Arterial 100 98 - 107 mmol/L    POCT Ionized Calcium, Arterial 1.15 1.10 - 1.33 mmol/L    POCT Glucose, Arterial 116 (H) 74 - 99 mg/dL    POCT Lactate, Arterial 0.3 (L) 0.4 - 2.0 mmol/L    POCT Base Excess, Arterial 3.3 (H) -2.0 - 3.0 mmol/L    POCT HCO3 Calculated, Arterial 27.8 (H) 22.0 - 26.0 mmol/L    POCT Hemoglobin, Arterial 8.6 (L) 12.0 - 16.0 g/dL    POCT Anion Gap, Arterial 10 10 - 25 mmo/L    Patient Temperature 37.0 degrees Celsius    FiO2 21 %   Tacrolimus level   Result Value Ref Range    Tacrolimus  3.3 <=15.0 ng/mL   POCT GLUCOSE   Result Value Ref Range    POCT Glucose 164 (H) 74 - 99 mg/dL   Prepare Platelets: 2 Units   Result Value Ref Range    PRODUCT CODE [id number]     Unit Number [id number]     Unit ABO A     Unit RH NEG     Dispense Status XM     Blood Expiration Date February 29, 2024 23:59 EST     PRODUCT BLOOD TYPE 0600     UNIT VOLUME 249      *Note: Due to a large number of results and/or encounters for the requested time period, some results  have not been displayed. A complete set of results can be found in Results Review.         Assessment/Plan   Assessment: Ms. 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    Originally presented to [facility] ED on 2/15/24 with complaints of N/V x 3 days and inability to keep medications down. POCUS revealed acute systolic heart failure w/ severe BIV dysfunction when compared to previous images in 11/2023. Also noted to have HIRAM requiring CVVH and transaminitis. Immunosuppression notably below therapeutic range. Admitted to HFICU and treated for suspected acute cellular vs. acute antibody mediated rejection; however, cardiac biopsy negative for signs of rejection. Despite rejection therapy, inotropes and MCS via IABP (2/18/24), the patient's end organ function continued to worsen without improvement in cardiac function. Ultimately placed on left femoral VA ECMO w/ Dr. Marina on 2/19/2024 and transferred to CTICU.     CTICU course complicated by anemia requiring blood product transfusions, volume overload & intubation (tachypnea and increased work of breathing 2/2 pulmonary edema). Status post extubation on 2/24. Now showing renal recovery off CVVH and cardiac allograft recovery.       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  #Acute renal failure 2/2 to cardiorenal syndrome - improving  #Acute transaminitis - improved and stabilized     Donor/Recipient Infectious history:  CMV: -/+ (last collected 2/23/24)  Toxo: -/-   Hep C: -/-    Rejection/Prophylaxis (transplants):  - Steroids:  Continue 10mg PO prednisone daily  - Tacrolimus: 3.0mg PO @ 0630 & 3.0mg PO @ 1830 w/ daily levels drawn @ 0600  - Serolimus: 2.5mg PO daily w/ daily levels drawn at 0600  - Tacrolimus goal troughs: 3-5  - Serolimus goal troughs: 7-9  - Combined sirolimus/tacrolimus goal of 10-12  - Antifungals: nystatin 500,000units Q6  - Antivirals: valcyte 450mg Q48hrs --> Holding due to thrombocytopenia (2/23)  - Anti PCP & Toxoplasmosis: Bactrim SS daily  --> Holding due to thrombocytopenia (2/23)    Last cardiac biopsy: 2/16/24 with ACR1 and no AMR  Last HLA: 2/16/24 negative for DSAs   Last RHC: 2/16/24 w/ RA 11, PAP 34/14(23), W 19 and C.I. 2.3  Last LHC: 2/18/24 negative for CAV and CAD   Last TTE/BOB: 2/28/24 shows improved LVEF to 30% and mild-mod RV dysfunction (ECMO turndown)   Osteopenia/osteoporosis prophylaxis: Vitamin D3 and calcium supplements  Peptic/gastric ulcer prophylaxis: Pantoprazole 40mg daily   CAV Prophylaxis: Aspirin 81mg daily & pravastatin daily    - Unclear cause of acute severe graft dysfunction. Most likely smoldering ACR vs. AMR; however, 2xallograft biopsies on 2/16 & 2/20 remain negative for any significant pathology. Notably, both biopsies taken after rejection therapies implemented which may have reduced areas of graft damage.    - DSAs remain negative; however, patient may have non-HLA antibodies present. 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   - Negative CAV & CAD via LHC on 2/18/24   - Completed methylpred steroid pulse w/ 1g Q24 x 3 days (2/16-2/18) and prednisone taper   - Thymoglobulin doses: 2/18 & 2/19   - IVIG + PLEX Session: 2/18 & 2/20   - Extubated on 2/24/24 w/ chest x-ray showing minimal pulmonary edema   - CVVH stopped on 2/27/24; now responding to IV diuretics    - LFTs stabilized and lactate normalized.    - Remains on MCS via right femoral IABP set 1:1 and augmenting appropriately & left femoral VA ECMO w/ appropriate flows ~3.0L/FIO2 100%/sweep0.5.  - Epinephrine  0.02mcg/kg/min and cleviprex for afterload reduction   - Hemodynamics appear appropriate w/ formal ECMO turn down trial to no flow, w/ EF ~30% and mild-moderate RV dysfunction.     Recommendations:  - Agree with plan for ECMO decannulation +/- IABP or Impella 5.5 step-down therapy  - Continue high flow ECMO until decannulation given anticoagulation being held due to ongoing anemia, epistaxis and transfusion requirements   - Agree with gentle fluid resuscitation given decreased filling pressures   - Please continue to obtain CMV PCR weekly while holding valcyte (last negative on 2/23/24. Next level on 3/1/24)        Appreciate continued CTICU care/management.    Patient was examined and discussed with Advanced Heart Failure and Transplant Cardiology attending physician, Dr. UMA Padilla & CTICU team     Heart Transplant Team:   Epic Secure Chat  [id number] during day shift    Keith Jain, CNP

## 2024-02-29 NOTE — OP NOTE
OPERATIVE REPORT     Date: 2024  OR Location: [facility] OR    Name: Charla Bowles, : 1990, Age: 33 y.o., MRN: [id number], Sex: female    Diagnosis  Pre-op Diagnosis     * Cardiogenic shock (CMS/HCC) [R57.0]  Principal Problem:    Cardiogenic shock (CMS/HCC)  Active Problems:    Heart transplant recipient (CMS/HCC)    Encounter for aftercare following heart transplant (CMS/HCC)    Heart transplant as cause of abnormal reaction or later complication (CMS/HCC)   Post-op Diagnosis     * Cardiogenic shock (CMS/HCC) [R57.0]  Principal Problem:    Cardiogenic shock (CMS/HCC)  Active Problems:    Heart transplant recipient (CMS/HCC)    Encounter for aftercare following heart transplant (CMS/HCC)    Heart transplant as cause of abnormal reaction or later complication (CMS/HCC)       Procedures  Removal Extracorporeal Membrane Oxygenation  20346 - LA ECMO/ECLS RMVL OF PRPH CANNULA PRQ 6 YRS & OLDER      Surgeons   Primary Dr. Pastor  Assisting: Antonio Witt MD      Resident/Fellow/Other Assistant:  Surgeon(s) and Role:     * Ramu Pastor MD - Assisting     * Darlene Mcwilliams MD - Assisting    Procedure Summary  Anesthesia:   * No anesthesia type entered *      ASA:   IV    Anesthesia Staff:   Anesthesiologist: Arian Ha MD  C-AA: DIANA Polo  Perfusionist: Evangelina Castillo    Specimen: No specimens collected     Staff:   Circulator: Jeyson Guillen RN; Viola Jenkins RN  Scrub Person: Griselda Coelho RN; Natalee Dobson RN; Henrique Cordero        Indications: Charla Bowles is an 33 y.o. female with hx of previous heart transplant presenting with graft dysfunction thought to be possibly related to rejection.  She was placed on ECMO due to worsening cardiogenic shock.  Her cardiac function has improved gradually to approximate ejection fraction of 30%.  After multidisciplinary discussions the plan was to proceed with ECMO decannulation.  Risks and benefits were  discussed with her in detail and she agreed to proceed.    Procedure Details:   The patient supine on the operating table the skin was prepped and draped.  The patient underwent sedation.  ECMO was gradually discontinued and the arterial and venous lines were clamped.  The venous line was divided and the blood was transfused back to the patient.    The reperfusion line was removed and closed using an Angio-Seal device.    The arterial line was rewired and closed using a size 14 Serbian Manta device.    The venous line was also rewired with close using a size 18 Serbian Manta device.    The left groin was hemostatic.  Platelet transfusion was given in view of the thrombocytopenia.  The balloon pump that is in the right femoral artery was kept in situ.    Initial assessment using Doppler of the pedal pulses on the left was unsatisfactory.  We had a multidisciplinary discussion and considered exploring the femoral artery however with time this seems to have improved and both pulses at the dorsalis pedis as well as posterior tibial were detectable with Doppler.  The patient remained stable.  She was transferred to the Cardiothoracic Intensive Care Unit in stable cardiorespiratory condition.  I was available for all aspects of the procedure preoperatively and postoperatively.     Dr Darby Witt

## 2024-02-29 NOTE — ANESTHESIA POSTPROCEDURE EVALUATION
Patient: Charla Bowles    Procedure Summary       Date: 02/29/24 Room / Location: [facility] OR 18 / [facility] OR    Anesthesia Start: 1115 Anesthesia Stop: 1342    Procedure: Removal Extracorporeal Membrane Oxygenation (Bilateral: Groin) Diagnosis:       Cardiogenic shock (CMS/HCC)      (Cardiogenic shock (CMS/HCC) [R57.0])    Surgeons: Antonio Witt MD Responsible Provider: Arian Ha MD    Anesthesia Type: general ASA Status: 4            Anesthesia Type: general    Vitals Value Taken Time   /47 02/29/24 1356   Temp 36.2 02/29/24 1356   Pulse 137 02/29/24 1355   Resp 28 02/29/24 1355   SpO2 100 % 02/29/24 1355   Vitals shown include unvalidated device data.    Anesthesia Post Evaluation    Patient location during evaluation: ICU  Patient participation: complete - patient participated  Level of consciousness: awake and alert  Pain management: adequate  Airway patency: patent  Cardiovascular status: acceptable  Respiratory status: acceptable, face mask, spontaneous ventilation and nonlabored ventilation  Hydration status: acceptable  Postoperative Nausea and Vomiting: none  Comments: Patient transported to CTICU with transport monitors in place, VSS, SV SM 8L O2/min. Report to NP, RN.        There were no known notable events for this encounter.

## 2024-02-29 NOTE — PROGRESS NOTES
Charla Bowles is a 33 y.o. female on day 14 of admission presenting with Cardiogenic shock (CMS/HCC).    Patient requires ECMO support. Drainage cannula site, cannula, pump, oxygenator, return cannula and return cannula site clinically inspected. RPM and sweep reviewed and adjusted appropriate to clinical context. Anticoagulation status and intensity discussed. Plan for weaning, next clinical steps discussed with team and family. 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  ECMO Cannula Configuration: Left fem VA ECMO  ECMO circuit run from drainage cannula to pump to oxygenator to return cannula: Yes  Presence of cannula bleeding: No  Presence of oxygenator clot: No  Pre/post PaO2 adequate: Yes  LV Unloading/Pulse Pressure: IABP 1: 1adequate  Distal Perfusion: Yes  Anticoagulation Plan/Monitoring: H  Mobility Plan/Candidacy: Passive ROM  Path to decannulation/anticipated decannulation date:Today  ECMO:     Most Recent Range Past 24hrs   Flow 3.13 Patient Flow (L/min)  Min: 3.1  Max: 3.34   Speed 2980 Circuit Flow (RPM)  Min: 2980  Max: 2981   Sweep 0.5 Sweep Gas Flow Rate (L/min)  Min: 0.5  Max: 0.5       clevidipine, 1-16 mg/hr, Last Rate: 4 mg/hr (02/29/24 0755)  EPINEPHrine, 0.02 mcg/kg/min (Dosing Weight), Last Rate: 0.02 mcg/kg/min (02/29/24 0755)  lactated Ringer's, 5 mL/hr, Last Rate: 5 mL/hr (02/29/24 0755)  lactated Ringer's, 10 mL/hr, Last Rate: 10 mL/hr (02/29/24 0755)                         Demetrio Murrell MD

## 2024-02-29 NOTE — ANESTHESIA PROCEDURE NOTES
Airway  Date/Time: 2/29/2024 12:50 PM  Urgency: elective    Airway not difficult    Staffing  Performed: DIANA   Authorized by: Arian Ha MD    Performed by: DIANA Polo  Patient location during procedure: OR    Indications and Patient Condition  Indications for airway management: anesthesia  Spontaneous Ventilation: absent  Sedation level: deep  Preoxygenated: yes  Patient position: sniffing  MILS not maintained throughout  Mask difficulty assessment: 2 - vent by mask + OA or adjuvant +/- NMBA  Planned trial extubation    Final Airway Details  Final airway type: endotracheal airway      Successful airway: ETT  Cuffed: yes   Successful intubation technique: direct laryngoscopy  Facilitating devices/methods: intubating stylet  Endotracheal tube insertion site: oral  Blade: Purvi  Blade size: #4  ETT size (mm): 7.0  Cormack-Lehane Classification: grade I - full view of glottis  Placement verified by: chest auscultation and capnometry   Number of attempts at approach: 1    Additional Comments  Significant christopher-glottic pharyngeal circumferential ecchymosis noted
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
## 2024-02-29 NOTE — PROGRESS NOTES
Charla Bowles is a 33 y.o. female on day 14 of admission presenting with Cardiogenic shock (CMS/HCC).    I have seen the patient either independently or with an associated resident physician or advanced practice provider    Overnight Events: Remains HDS. Plan for decannulation this AM    Neuro: AaOx3, PRN melatonin for sleep. PT to do passive ROM in bed today  ENT: No epistaxis overnight. Appreciate ENT recs  Cardiac: VA ECMO for stuttering rejection of OHT from 3/22. Tolerated turndown trial x 2. IABP 1:1. On Epi 02 for intoropic support. Plan for decannulation today, may or may not remove IABP. PRN antihypertensives available.    Pacer: None  Pulmonary: Room Air, clear XR  Gastrointestinal: Tolerating PO when able to eat, PPI, bowel regimen  Renal: HIRAM with Cr 2.2. Likely degree of hypovolemia given recurrent NPO status. Adding concentrated albumin today, PO following decannulation. UoP ok  Endocrine: Home levothyroxine, SSI 1  Hematology: H/H stable, plts 72. Will have platelets on call for decannulation. LDH reassuring  Infectious Disease: No active antimicrobials. Holding off prophy bactrim/valcyte  Immuno: Siro 2.5 every day, Tacro 4 mg BID, pred 10 mg. Biopsy scheduled for 3/6.   Musculoskeletal: Groin sites appear well, no skin issues    Lines/Tubes/Drains: RIJ MAC with PAC, a line, ECMO cannulas, wheatley    Mechanical Circulatory Support: ECMO:     Most Recent Range Past 24hrs   Flow 3.13 Patient Flow (L/min)  Min: 3.1  Max: 3.34   Speed 2980 Circuit Flow (RPM)  Min: 2980  Max: 2981   Sweep 0.5 Sweep Gas Flow Rate (L/min)  Min: 0.5  Max: 0.5         Prophylaxis: SCDs, PPI  Med to Discontinue: None    Disposition: CTICU    ABCDEF Checklist  Analgesia: Spontaneous awakening trial to be pursued if clinically appropriate. RASS goal reviewed  Breathing: Spontaneous breathing trial to be pursued if clinically appropriate. Mechanical power of assisted ventilation reviewed  Choice of analgesia/sedation:  Analgesic and sedative agents adjusted per clinical context.   Delirium assessed by CAM, will avoid exacerbating factors  Early mobility and exercise: Physical and occupational therapy engaged  Family: Plan of care, overall trajectory of patient shared with family. Questions elicited and answered as appropriate.       Due to the high probability of life threatening clinical decompensation, the patient required critical care time evaluating and managing this patient.  Critical care time included obtaining a history, examining the patient, ordering and reviewing studies, discussing, developing, and implementing a management plan, evaluating the patient's response to treatment, and discussion with other care team providers. I saw and evaluated the patient myself.  Critical care time was performed exclusive of billable procedures.    Critical care time: 54            Demetrio Murrell MD

## 2024-02-29 NOTE — PROGRESS NOTES
CTICU Progress Note  Charla Bowles/[id number]    Admit Date: 2/15/2024  Hospital Length of Stay: 14   ICU Length of Stay: 9d 18h   CT SURGEON:     SUBJECTIVE:   SHAVON    MEDICATIONS  Infusions:  clevidipine, Last Rate: 4 mg/hr (02/29/24 0755)  EPINEPHrine, Last Rate: 0.02 mcg/kg/min (02/29/24 0755)  lactated Ringer's, Last Rate: 5 mL/hr (02/29/24 0755)  lactated Ringer's, Last Rate: 10 mL/hr (02/29/24 0755)      Scheduled:  acetaminophen, 975 mg, q6h  albumin human, 25 g, q6h  aspirin, 81 mg, Daily  calcitriol, 0.5 mcg, Daily  ferrous sulfate (325 mg ferrous sulfate), 65 mg of iron, Daily with breakfast  heparin (porcine), 5,000 Units, q8h  hydrALAZINE, 25 mg, TID  insulin regular, 0-15 Units, Before meals & nightly  insulin regular, 3 Units, TID with meals  levothyroxine, 200 mcg, Daily  lidocaine, 1 patch, Daily  melatonin, 10 mg, Nightly  multivitamin with minerals, 1 tablet, Daily  nystatin, 5 mL, q6h  oxymetazoline, 2 spray, TID  pantoprazole, 40 mg, Daily before breakfast  polyethylene glycol, 17 g, BID  predniSONE, 10 mg, Daily  rosuvastatin, 40 mg, Nightly  sennosides-docusate sodium, 1 tablet, Daily  sirolimus, 2.5 mg, Daily  sodium chloride, 5 spray, 4x daily  [Held by provider] sulfamethoxazole-trimethoprim, 80 mg of trimethoprim, Daily  tacrolimus, 4 mg, q12h TRICIA  [Held by provider] valGANciclovir, 450 mg, q48h      PRN:  calcium gluconate, 1 g, q6h PRN  calcium gluconate, 2 g, q6h PRN  dextrose, 25 g, q15 min PRN  dextrose, 25 g, q15 min PRN   Or  glucagon, 1 mg, q15 min PRN  glucagon, 1 mg, q15 min PRN  hydrALAZINE, 10 mg, q4h PRN  artificial tears, 2 drop, PRN  magnesium sulfate, 1 g, q6h PRN  magnesium sulfate, 2 g, q6h PRN  mupirocin, , BID PRN  oxyCODONE, 10 mg, q4h PRN  oxyCODONE, 5 mg, q4h PRN  potassium chloride, 40 mEq, q6h PRN  potassium chloride CR, 20 mEq, q6h PRN  traZODone, 50 mg, Nightly PRN        PHYSICAL EXAM:   Visit Vitals  /59   Pulse (!) 122   Temp 37 °C (98.6 °F)   Resp  "15   Ht 1.549 m (5' 0.98\")   Wt 91.3 kg (201 lb 4.5 oz)   SpO2 100%   BMI 38.05 kg/m²   OB Status Hysterectomy   Smoking Status Never   BSA 1.98 m²     Wt Readings from Last 5 Encounters:   02/27/24 91.3 kg (201 lb 4.5 oz)   12/07/23 92.1 kg (203 lb)   12/01/23 93 kg (205 lb)   11/29/23 92.9 kg (204 lb 12.8 oz)   11/09/23 91.3 kg (201 lb 3.2 oz)     INTAKE/OUTPUT:  I/O last 3 completed shifts:  In: 3763.5 (41.2 mL/kg) [P.O.:400; I.V.:1163.5 (12.7 mL/kg); Blood:900; IV Piggyback:1300]  Out: 5175 (56.7 mL/kg) [Urine:5175 (1.6 mL/kg/hr)]  Weight: 91.3 kg      Physical Exam:   - CONSTITUTION: critically ill, young appearing female on ECMO and IABP   - NEUROLOGIC: Alert and oriented, CAM negative. Moving all extremities with no focal deficits  - CARDIOVASCULAR: ST. On VA ECMO left femoral vein and artery with distal perfusion catheter. Right femoral IABP 1:1 with appropriate augmentation. No hematoma or oozing noted.  - RESPIRATORY: RA and CTAB  - : Singh with clear, yellow urine.   - GI: Soft/ND/NT. Active BS.  - EXTREMITIES:  Right fem IABP, Left fem VA ECMO, small hematoma, warm extremity down to ankle, left foot cool, dopplerable.  NV exam intact x 4, no edema on exam.  - SKIN: WDI  - PSYCHIATRIC: Calm, appropriate    Daily Risk Screen  Central line: required- hemodynamic monitoring, parenteral medications  Singh: required, accurate I/O in critically ill    Invasive Hemodynamics:    Most Recent Range Past 24hrs   BP (Art) 95/59 Arterial Line BP 1  Min: 89/60  Max: 118/61   MAP(Art) 76 mmHg Arterial Line MAP 1 (mmHg)   Min: 74 mmHg  Max: 87 mmHg   RA/CVP   No data recorded   PA 5/0 PAP  Min: 5/0  Max: 34/22   PA(mean) 2 mmHg PAP (Mean)  Min: 2 mmHg  Max: 27 mmHg   CO 5.7 L/min No data recorded   CI 2.9 L/min/m2 No data recorded   Mixed Venous 82 % SVO2 (%)  Min: 64 %  Max: 82 %   SVR  758 (dyne*sec)/cm5 No data recorded     ECMO:     Most Recent Range Past 24hrs   Flow 3.14 Patient Flow (L/min)  Min: 3.1  Max: 3.33 "   Speed 2980 Circuit Flow (RPM)  Min: 2980  Max: 2981   Sweep 0.5 Sweep Gas Flow Rate (L/min)  Min: 0.5  Max: 0.5      All relevant imaging, diagnostics and labwork reviewed.    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 She was admitted to HFICU on 2/15 with concern for cardiogenic shock secondary to allograft rejection and decompensated heart failure with multiorgan dysfunction including significant elevation of liver enzymes and nonoliguric acute kidney injury. Endomyocardial biopsy on 2/16 revealed mild ACR with +CD4s and negative HLAs, however multisystem organ failure persisted with increased inotropic requirements. IABP placed on 2/18. Transferred to CTICU on 2/19 for VA ECMO cannulation with Dr. Marina for persistent multi-organ system dysfunction. Intubated 2/21 for respiratory failure 2/2 pulmonary edema, extubated 2/24.       Plan:  NEURO: No history. Neuro intact and CAM negative, RASS 0. Acute postoperative pain adequately controlled on current regimen. Previous insomnia 2/2 epistaxis, improved with melatonin and resolution of epistaxis.  -->  - Serial neuro and pain assessments  - continue scheduled Tylenol 975 mg q6  - continue oxy 5-10 mg q4 PRN for mod-severe pain  - PT/OT Consult  - continue scheduled melatonin 10 mg nightly for sleep, Trazodone 50 mg PRN for persisting insomnia  - CAM ICU score qshift. Sleep/wake cycle hygiene    ENT: Pt with signficant epistaxisis overnight 2/28 requiring ENT consult ~ R anterior nare packed with surgicel with resolution.  - Afrin 3 x daily to bilateral nares x 3 days  - nasal saline spray 5 sprays bilaterally x 10 days  - PRN mupirocin ointment to bilateral anterior nasal septum PRN     CV: Patient has a history of heart transplant in March of 2022 with TTE on 11/29 with LVEF 60-65%.  Admitted for cardiogenic shock with concern for acute cellular rejection s/p IABP placement (R fem) 2/18 and VA ECMO (left fem) 2/19. ECHO 2/20 with persisting severe BiV dysfunction despite negative biopsy 2/20.  Echo with turn-down 2/22 EF 10%, severely reduced RV, mild AR and mild-moderate MR, Repeat turndown (0.8L) TTE 2/27 with LVEF 30% and moderate RV.   2/28 TTE with EF 30% and reduced RV (on 0L flow).  Cardiogenic shock persists but improving, remains on ECMO 3.1L flow/100%FiO2/sweep 0.5 and IABP 1:1 for mechanical support and epinephrine 0.02 mcg/kg/min for inotropic support.  SPA 9-10's and CVP 0-3.  Remains -130. Running hypertensive requiring clevidipine gtt but improving with addition of scheduled hydral. -->  - Maintain goal MAP 70-90  - continue full ECMO support with flow at minimum 3L, as pt off anticoagulation. Daily pre/post gases/LDH, NV checks, and dressings per protocol  - Maintain IABP 1:1 until weaned off ECMO  - Continue epi 0.02mcg/kg/min for inotropic support while weaning mechanical support  - continue clevidipine gtt for goal MAP < 90  - continue PO hydral 25 mg q8 and continue PRN hydral 10 mg for MAP >90 to attempt clevidipine wean - will more aggressively up titrate scheduled doses post decan  - continue home rosuvastatin 40mg daily  - continue ASA 81 mg daily  - Hold home amlodipine  - awaiting surgical team decision surrounding ECMO decannulation timing, +/- impella insertion  - now planned for today with [facility]: Vascular surgery previously following for diminished pulses in LLE.  Now palpable DP bilaterally, grossly intact NV exam to BLE, and feet warm, absent LLE PT which is unchanged.   - Vascular surgery signed off     PULM: No history. Acute respiratory failure now resolved, intubated 2/21-2/24.  On RA with appropriate oxygenation/ventilation.  CXR poorly penetrated but stable, with minimal pulmonary edema. -->  - Daily CXR  - Maintain SPO2 > 92%  - Adjust sweep  for normal pH     GI: History of gastric bypass and MMF colitis.  Passed beside swallow eval and tolerating PO, currently NPO for possible OR today.  Mild transaminitis downtrending.   Previous liquid BM's, now resolved with cessation of TF.  Last BM 2/27. -->  - Continue home PPI, calcitriol 0.5mg daily, multivitamin, calcium carbonate 1,250mg BID  - NPO pending possible OR today, resume carb controlled diet when able  - continue miralax BID and resume senna-colace but just daily for now     : No history, baseline Cr 1.2-1.3.  Previous oliguric HIRAM requiring CVVH, likely in setting of cardiorenal physiology. Renal US from 2/19 unremarkable.  Cr remains slightly elevated but downtrending, and UOP remains acceptable~ likely 2/2 hypovolemia given significant autodiuresis over the past 72 hours. UOP /hr, net even with IVF bolus yesterday.  Mild hyponatremia resolved, hypokalemia replenished.  Appears euvolemic to hypovolemic on exam with globally low filling pressures. -->  - RFP as clinically indicated, replete electrolytes per CTICU protocol  - give 25g 25% albumin q6 for the next 24 hours for continued gentle IVF resuscitation  - holding off on diuresis unless trending significantly positive or filling pressures begin to show FVO  - leave wheatley for now given potential decannulation today to evaluate end organ perfusion     ENDO: History of hypothyroidism TSH 12.02, free thyroxine 2.19 (Endo previously consulted but now signed off). A1c: 6.3. Stress hyperglycemia slowly improving. -->  - Maintain BG <180 with hypoglycemia protocol  - increase regular SSI to #3 ACHS, will continue to increase SSI daily with goal to wean off prandial   - continue prandial regular insulin 3u, holding when NPO   - Continue Synthroid 200mcg daily  - Follow up with primary Endo as outpatient for thyroid monitoring, per inpatient Endo recs      HEME: History of remote DVT. Acute on chronic iron deficiency anemia. Acute blood loss  anemia, previously requiring serial transfusions while on systemic AC but hgb now grossly stable without active s/s of bleeding.  Stable thrombocytopenia. PF4 2/21 negative.  LDH and pre/post oxygenator gases acceptable. -->    - CBC as clinically indicated  - Holding systemic ECMO AC due to previous bleeding  - Continue daily ferrous sulfate  - SCDs and SQH for DVT prophylaxis  - Last type and screen: 2/28  - 4/4/2 placed on hold for potential OR  - 1 uPlatelets on call for OR today     Rejection/prophylaxis: S/p heart transplant 3/31/2022. Induction basiliximab. Donor HCV -, toxo -/-, CMV -/+. Mild ACR with +CD4 and negative HLAs however clinical presentation concern for AMR rejection.  PLEX/IVIG 2/17, 2/20. Thymo 2/18, 2/19. Repeat biopsy 2/20 with no evidence of on going rejection (ACR 0R, pAMR0 and no C3D or C4D).  - Completed steroid taper, continue prednisone 10mg daily  - Continue tacrolimus 4 mg BID with goal level 3-5  - continue sirolimus 2 mg daily with goal level 7-9  - Plan for no further PLEX/IVIG or thymo  - continue Nystatin suspension 500,000 units q6hr  - Hold bactrim and valcyte in setting of thrombocytopenia per Transplant  - CMV PCR ordered for Friday 3/1 per Transplant  - biopsy planned for 3/6 - will need to be NPO at 0000     ID: Received Zosyn and Vancomycin on 2/15 in ED. Blood cultures from 2/15 negative. No leukocytosis and afebrile. -->  - Trend temp q4h     Skin: No active skin issues. All ECMO/IABP dressings CDI.  - Preventative Mepilex dressings in place on sacrum and heels  - Change preventative Mepilex weekly or more frequently as indicated (when moist/soiled)   - Every shift skin assessment per nursing and weekly ICU skin rounds  - Moisture barrier to be applied with christopher care  - Active skin problems addressed with nursing on daily rounds     Proph:  SCDs  SQH  Home PPI     G: Line  Right radial arterial line placed 2/16  RIJ introducer with PAC placed 2/16  LIF Trialysis placed  2/17  Right femoral IABP placed 2/18  8F Left femoral reperfusion cannula placed 2/19  Left femoral 17F arterial ECMO cannula placed 2/19  Left femoral 25F venous ECMO cannula placed 2/19    Will discuss lines that need exchanged/removed as appropriate, once operative plans finalized.     F: Family: family updated at bedside daily    Restraints: Not indicated    Code status: Full Code     A,B,C,D,E,F,: reviewed    Dispo: CTICU    I personally spent 45 minutes of critical care time directly and personally managing the patient exclusive of separately billable procedures.    Keara Eller, APRN-CNP  CTICU [id number]

## 2024-02-29 NOTE — ANESTHESIA PREPROCEDURE EVALUATION
Patient: Charla Bowles    Procedure Information       Date/Time: 02/29/24 1100    Procedure: Removal Extracorporeal Membrane Oxygenation (Bilateral)    Location: [facility] OR 18 / [facility] OR    Surgeons: Antonio Witt MD            Relevant Problems   Cardiovascular   (+) Chronic clinical systolic heart failure (CMS/HCC)   (+) Essential hypertension      Endocrine   (+) Acquired hypothyroidism   (+) Goiter diffuse, nontoxic   (+) Simple goiter      GI   (+) GERD (gastroesophageal reflux disease)      /Renal   (+) Anemia due to chronic kidney disease, on chronic dialysis (CMS/HCC)      Neuro/Psych   (+) Anxiety   (+) Carpal tunnel syndrome   (+) Depression with anxiety      Pulmonary   (+) Shortness of breath on exertion      Hematology   (+) Anemia due to chronic kidney disease, on chronic dialysis (CMS/HCC)      Musculoskeletal   (+) Carpal tunnel syndrome   (+) Lupus erythematosus      Infectious Disease   (+) Trichimoniasis   (+) Viral wart, unspecified   (+) Yeast vaginitis       Clinical information reviewed:   Tobacco  Allergies  Meds  Problems  Med Hx  Surg Hx  OB Status    Fam Hx  Soc Hx        NPO Detail:  No data recorded     Physical Exam    Airway  Mallampati: II  TM distance: >3 FB  Neck ROM: full     Cardiovascular    Dental - normal exam     Pulmonary    Abdominal            Anesthesia Plan    History of general anesthesia?: yes  History of complications of general anesthesia?: no    ASA 4     general     intravenous induction   Postoperative administration of opioids is intended.  Trial extubation is planned.  Anesthetic plan and risks discussed with patient.  Use of blood products discussed with patient who consented to blood products.    Plan discussed with attending.    Plan general endotracheal anesthesia with peripheral IV placement and ASA standard noninvasive monitors.  Use of in situ invasive monitors planned.  The possibility of blood product transfusion was  also described in detail.  Risks, benefits, alternatives of this plan were described in detail to the patient, who indicated understanding and agreed to proceed.    Arian Ha MD

## 2024-02-29 NOTE — ANESTHESIA POSTPROCEDURE EVALUATION
Patient: Charla Bowles    Procedure Summary       Date: 02/29/24 Room / Location: [facility] OR 18 / [facility] [place]    Anesthesia Start: 1115 Anesthesia Stop:     Procedure: Removal Extracorporeal Membrane Oxygenation (Bilateral: Groin) Diagnosis:       Cardiogenic shock (CMS/HCC)      (Cardiogenic shock (CMS/HCC) [R57.0])    Surgeons: Antonio Witt MD Responsible Provider: Arian Ha MD    Anesthesia Type: general ASA Status: 4            Anesthesia Type: general    Vitals Value Taken Time   BP See chart 02/29/24 1352   Temp See chart 02/29/24 1352   Pulse 137 02/29/24 1351   Resp 24 02/29/24 1351   SpO2 100 % 02/29/24 1351   Vitals shown include unvalidated device data.    Anesthesia Post Evaluation    Patient location during evaluation: bedside  Patient participation: complete - patient participated  Level of consciousness: awake and alert  Pain management: adequate  Airway patency: patent  Cardiovascular status: acceptable  Respiratory status: acceptable  Hydration status: acceptable  Postoperative Nausea and Vomiting: none        No notable events documented.  Patient is somnolent but arousable.  No current complaints;  ICU nurse at bedside.  Pain reasonably controlled.  Normothermic.  Euvolemic and hemodynamically stable.  Stable respiratory status;  no current nausea or emesis.  Indicated ICU care given.    Arian Ha MD

## 2024-03-01 NOTE — PROGRESS NOTES
5/5 CTICU     OVERVIEW  03/2022 (OHT) Heart Transplant presented to [facility] ED 2/15 for n/v. 2/16 heart biopsy with rejection. 2/18 IABP. 2/19 VA-ECMO & CVVH --> transferred from  to CTICU. Presently 2/20 on room air. CTICU course complicated by ongoing anemia requiring transfusion requirements, volume overload & 2/21 intubation (tachypnea and increased work of breathing 2/2 pulmonary edema). 2/24 extubated. 2/27 CVVH stopped. 2/29 decannulated. Plan to remove IABP ...     DC PLAN  TBD - Care Transitions is following to develop a safe & supportive discharge plan in collaboration with TRANSPLANT & multidisciplinary teams (along with) patient/family/significant others.       PAYOR  [facility]      COMPLETED  (X) Daily ongoing review of patient via chart and/or (M-F) IDT rounds     Claribel Diaz (LSW, MSW)

## 2024-03-01 NOTE — PROGRESS NOTES
Brief Attending Summary:   Ms. 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     Originally presented to [facility] ED on 2/15/24 with complaints of N/V x 3 days and inability to keep medications down. POCUS revealed acute systolic heart failure w/ severe BIV dysfunction when compared to previous images in 11/2023. Also noted to have HIRAM requiring CVVH and transaminitis. Immunosuppression notably below therapeutic range. Admitted to HFICU and treated for suspected acute cellular vs. acute antibody mediated rejection; however, cardiac biopsy negative for signs of rejection. Despite rejection therapy, inotropes and MCS via IABP (2/18/24), the patient's end organ function continued to worsen without improvement in cardiac function. Ultimately placed on left femoral VA ECMO w/ Dr. Marina on 2/19/2024 and transferred to CTICU.      CTICU course complicated by anemia requiring blood product transfusions, volume overload & intubation (tachypnea and increased work of breathing 2/2 pulmonary edema). Status post extubation on 2/24. Now showing renal recovery off CVVH and cardiac allograft recovery.     I have reviewed and evaluated the most recent data and results, personally examined the patient, and formulated the plan of care as presented above. This patient was critically ill and required continued critical care treatment. Teaching and any separately billable procedures are not included in the time calculation.    Billing Provider Critical Care Time: 35 minutes    Kymberly Padilla MD MPH

## 2024-03-01 NOTE — ANESTHESIA PROCEDURE NOTES
Airway  Date/Time: 3/1/2024 6:51 PM  Urgency: elective    Airway not difficult    Staffing  Performed: attending   Authorized by: Vikram Scott MD    Performed by: Yasmani Gaona MD  Patient location during procedure: OR    Indications and Patient Condition  Indications for airway management: anesthesia  Spontaneous Ventilation: absent  Sedation level: deep  Preoxygenated: yes  Patient position: sniffing  Mask difficulty assessment: 2 - vent by mask + OA or adjuvant +/- NMBA  No planned trial extubation    Final Airway Details  Final airway type: endotracheal airway      Successful airway: ETT  Cuffed: yes   Successful intubation technique: direct laryngoscopy  Facilitating devices/methods: intubating stylet  Endotracheal tube insertion site: oral  Blade: Purvi  Blade size: #4  ETT size (mm): 7.0  Cormack-Lehane Classification: grade IIa - partial view of glottis  Placement verified by: chest auscultation and capnometry   Number of attempts at approach: 1  Ventilation between attempts: none    Additional Comments  Significant christopher-glottic ecchymosis noted, unchanged from 2/29/24.

## 2024-03-01 NOTE — PROGRESS NOTES
Occupational Therapy    Evaluation and Treatment    Patient Name: Charla Bowles  MRN: [id number]  Today's Date: 3/1/2024  Time Calculation  Start Time: 1038  Stop Time: 1118  Time Calculation (min): 40 min    Assessment  IP OT Assessment  OT Assessment: Pt is a 33 year old female who demonstrates decreased strength, balance, activity tolerance, coordination, and insight, which impedes occupational performance.  Prognosis: Good  Barriers to Discharge: Inaccessible home environment  Evaluation/Treatment Tolerance: Patient tolerated treatment well  Medical Staff Made Aware: Yes  End of Session Communication: Bedside nurse  End of Session Patient Position: Bed, 3 rail up, Alarm off, caregiver present  Plan:  Treatment Interventions: ADL retraining, Functional transfer training, UE strengthening/ROM, Endurance training, Cognitive reorientation, Patient/family training, Equipment evaluation/education, Neuromuscular reeducation, Compensatory technique education  OT Frequency: 4 times per week  OT Discharge Recommendations:  (Unable to determine at this time, see chart for details)  Equipment Recommended upon Discharge:  (TBD)  OT Recommended Transfer Status: Assist of 2  OT - OK to Discharge: Yes    Subjective   Current Problem:  1. Cardiogenic shock (CMS/HCC)  Renal artery duplex complete    Renal artery duplex complete    Case Request Cath Lab: Right Heart Cath    Case Request Cath Lab: Right Heart Cath    Cardiac catheterization - non-coronary    Cardiac catheterization - non-coronary    Case Request Cath Lab: Left Heart Cath, IABP Insertion    Case Request Cath Lab: Left Heart Cath, IABP Insertion    Cardiac catheterization - non-coronary    Cardiac catheterization - non-coronary    Cardiac catheterization - coronary    Cardiac catheterization - coronary    Case Request Cath Lab: Endomyocardial Biopsy    Case Request Cath Lab: Endomyocardial Biopsy    Cardiac catheterization - non-coronary    Cardiac  catheterization - non-coronary    Intubation    Intubation    Transthoracic Echo (TTE) Limited    Transthoracic Echo (TTE) Limited    Transthoracic Echo (TTE) Limited    Transthoracic Echo (TTE) Limited    Transthoracic Echo (TTE) Limited    Transthoracic Echo (TTE) Limited    Case Request Operating Room: Impella VAD Insertion    Case Request Operating Room: Impella VAD Insertion    Transthoracic Echo (TTE) Limited    Transthoracic Echo (TTE) Limited    Transthoracic Echo (TTE) Limited    Transthoracic Echo (TTE) Limited    Case Request Operating Room: Removal Extracorporeal Membrane Oxygenation    Case Request Operating Room: Removal Extracorporeal Membrane Oxygenation    Transthoracic Echo (TTE) Limited    Transthoracic Echo (TTE) Limited    Transthoracic Echo (TTE) Limited    Transthoracic Echo (TTE) Limited    CANCELED: Transthoracic Echo (TTE) Limited    CANCELED: Transthoracic Echo (TTE) Limited    CANCELED: Transthoracic Echo (TTE) Limited    CANCELED: Transthoracic Echo (TTE) Limited    CANCELED: Transthoracic Echo (TTE) Limited    CANCELED: Transthoracic Echo (TTE) Limited    CANCELED: Transthoracic Echo (TTE) Limited    CANCELED: Transthoracic Echo (TTE) Limited    CANCELED: Transthoracic Echo (TTE) Limited    CANCELED: Transthoracic Echo (TTE) Limited      2. Acute renal failure, unspecified acute renal failure type (CMS/HCC)  Renal artery duplex complete    Renal artery duplex complete      3. Heart transplant recipient (CMS/HCC)  Transthoracic Echo (TTE) Limited    Transthoracic Echo (TTE) Limited    Transthoracic Echo (TTE) Limited    Transthoracic Echo (TTE) Limited    Case Request Cath Lab: Endomyocardial Biopsy    Case Request Cath Lab: Endomyocardial Biopsy    Cardiac catheterization - non-coronary    Cardiac catheterization - non-coronary    Transthoracic Echo (TTE) Limited    Transthoracic Echo (TTE) Limited    Transthoracic Echo (TTE) Limited    Transthoracic Echo (TTE) Limited    CANCELED:  Transthoracic Echo (TTE) Limited    CANCELED: Transthoracic Echo (TTE) Limited    CANCELED: Transthoracic Echo (TTE) Limited    CANCELED: Transthoracic Echo (TTE) Limited    CANCELED: Transthoracic Echo (TTE) Limited    CANCELED: Transthoracic Echo (TTE) Limited      4. Encounter for aftercare following heart transplant (CMS/HCC)  Case Request Cath Lab: Right Heart Cath    Case Request Cath Lab: Right Heart Cath    Cardiac catheterization - non-coronary    Cardiac catheterization - non-coronary    Case Request Cath Lab: Left Heart Cath, IABP Insertion    Case Request Cath Lab: Left Heart Cath, IABP Insertion    Cardiac catheterization - non-coronary    Cardiac catheterization - non-coronary    Cardiac catheterization - coronary    Cardiac catheterization - coronary      5. Atherosclerosis of renal artery (CMS/HCC)  Renal artery duplex complete      6. Cardiac transplant rejection (CMS/HCC)  Transthoracic Echo (TTE) Limited    Transthoracic Echo (TTE) Limited    Transthoracic Echo (TTE) Limited    Transthoracic Echo (TTE) Limited    Vascular US lower extremity arterial duplex left    Vascular US lower extremity arterial duplex left      7. Heart transplant as cause of abnormal reaction or later complication (CMS/HCC)  Transthoracic Echo (TTE) Limited    Transthoracic Echo (TTE) Limited    Case Request Cath Lab: Endomyocardial Biopsy    Case Request Cath Lab: Endomyocardial Biopsy    Cardiac catheterization - non-coronary    Cardiac catheterization - non-coronary      8. 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 Altered tissue perfusion  CANCELED: Lower extremity arterial duplex bilateral    CANCELED: Lower extremity arterial duplex bilateral      10. Peripheral vascular disease, unspecified (CMS/HCC)  Vascular US lower extremity arterial duplex left     Vascular US lower extremity arterial duplex left        General:  Reason for Referral: admitted to [facility]ICU on 2/15 with concern for cardiogenic shock secondary to allograft rejection and decompensated heart failure with multiorgan dysfunction including significant elevation of liver enzymes and nonoliguric acute kidney injury. Endomyocardial biopsy on 2/16 revealed mild ACR with +CD4s and negative HLAs, however multisystem organ failure persisted with increased inotropic requirements. IABP placed on 2/18. Transferred to [facility] on 2/19 for [facility]O cannulation with Dr. Marina for persistent multisystem dysfunction. Intubated 2/19-2/24 2/2 pulmonary edema, decannulated 2/29.  Past Medical History Relevant to Rehab: heart transplant in March 2022 with postoperative course complicated by upper extremity/internal jugular DVTs, and asymptomatic 2R rejection in November 2022  Co-Treatment: PT  Co-Treatment Reason: Pt with femoral IABP requiring skilled 2 person assist for safe mobility  Prior to Session Communication: Bedside nurse, Physician  Patient Position Received: Bed, 3 rail up, Alarm off, not on at start of session  Family/Caregiver Present: No  General Comment: Pt supine in bed upon arrival with PT at bedside. Agreeable to participate. Pt with RLE femoral IABP (1:2), groin site with stable amount of  blood under dressing, unchanged pre, post, and during mobility. Pt also with [place] and on .125 milrinone.     Precautions:  Medical Precautions: Fall precautions, Cardiac precautions  Precautions Comment: MAP 70-90, RLE femoral IABP precautions (no hip extension, no hip flexion > 30 degrees, protective precautions.    Vital Signs:  Heart Rate: (!) 140 (Max : Post: 137)  Resp: 25 (Post: 23)  SpO2: 99 % (Post: 100)  BP: 85/74 (Post: 86/62)  MAP (mmHg): 84 (MAPs ranged from high 70s to mid 80s with mobility)  BP Location:  (femoral IABP)    Pain:  Pain Assessment  Pain Assessment: 0-10  Pain Score: 0 - No  "pain  Lines/Tubes/Drains:  CVC 02/17/24 Non-tunneled Left Internal jugular (Active)   Number of days: 12       Introducer 02/16/24 Internal jugular Right (Active)   Number of days: 14       Arterial Line 02/16/24 Right Radial (Active)   Number of days: 14       Intra Aortic Balloon Pump 7.5 Fr. 40 mL (Active)   Number of days: 11       Pulmonary Artery Catheter Internal jugular (Active)   Number of days: 14       Urethral Catheter 16 Fr. (Active)   Number of days: 8         Objective   Cognition:  Overall Cognitive Status: Within Functional Limits  Orientation Level: Oriented X4  Attention: Exceptions to WFL  Alternating Attention: Impaired  Memory: Exceptions to WFL  Short-Term Memory: Impaired (reported multiple times, \"I need to remember not to put weight in my right leg\" despite frequent cues for IABP precautions)  Safety/Judgement: Exceptions to WFL  Insight: Moderate (Reported she will be discharging in next couple days despite current medical status, required multiple rest breaks with mobility however denied fatigue/decreased activity tolerance)     Confusion Assessment Method (CAM)  Acute Onset and Fluctuating Course (1A): Yes  Acute Onset and Fluctuating Course (1B): Yes  Inattention (2): No  Disorganized Thinking (3): No  Rate Patient's Level of Consciousness (4): Alert (Normal), No  Delirium Present: No     Home Living:  Type of Home: House  Lives With:  (mother and 4 children (aged 17, 10 and 11))  Home Layout: Two level (bathroom on 2nd floor, plans to stay on first floor with BSC and sponge bathe)  Home Access: Stairs to enter with rails  Entrance Stairs-Number of Steps: 3  Bathroom Shower/Tub:  (plans to sponge bathe on 1st floor)  Bathroom Equipment: Bedside commode     Prior Function:  Level of [place]: Independent with ADLs and functional transfers  ADL Assistance: Independent  Homemaking Assistance: Independent  Ambulatory Assistance: Independent  Vocational: On disability  Hand Dominance: " Right  Prior Function Comments: +drives     ADL:  Eating Assistance: Independent  Grooming Assistance: Modified independent (Device)  Bathing Assistance: Moderate  UE Dressing Assistance: Minimal  UE Dressing Deficit:  (anticipated)  LE Dressing Assistance: Total  LE Dressing Deficit:  (2/2 fem IABP)  Toileting Assistance with Device: Total  Toileting Deficit:  (2/2 fem IABP)    Activity Tolerance:  Endurance: Tolerates 10 - 20 min exercise with multiple rests  Early Mobility/Exercise Safety Screen: Proceed with mobilization - No exclusion criteria met  Activity Tolerance Comments: Denied fatigue, required x3 static standing rest breaks.     Bed Mobility/Transfers: Bed Mobility  Bed Mobility: Yes  Bed Mobility 1  Bed Mobility Comments 1: Pt dependently laterally transferred x4 assist from bed <> tilt table with draw sheet.   and Transfers  Transfer: Yes  Transfer 1  Trials/Comments 1: With tilt table, pt progressively transitioned from flat to fully upight in increments of 20-30 degrees with multiple rest breaks while monitoring symptoms and hemodynamic stability. Tolerated well. IABP site examined throughout and intermittent right knee block provided.      Vision: Vision - Basic Assessment  Current Vision: No visual deficits       Sensation:  Sensation Comment: WFL    Strength:  Strength Comments: BUE 4/5     Coordination:  Movements are Fluid and Coordinated: No  Coordination Comment: Decreased BLE coordination R>L     Hand Function:  Hand Function  Gross Grasp: Functional  Coordination: Functional     Treatment Completed on Evaluation  Therapy/Activity:     Therapeutic Activity  Therapeutic Activity Performed: Yes  Therapeutic Activity 1: Pt took step off/on tilt table with FWW, gait belt, and min A x2 +2 assist to manage equipment. Ambulated a moderate household distance with min A x2 +2 assist to manage equipment and FWW. Pt with occasional right drift requiring physical adjustment of walker to correct. 3  static standing rest breaks required. Tolerated well. Fem IABP site monitored throughout, unchanged. MAPs high 70s-mid 80s throughout.    Outcome Measures: [facility] Daily Activity  Putting on and taking off regular lower body clothing: Total  Bathing (including washing, rinsing, drying): A lot  Putting on and taking off regular upper body clothing: A little  Toileting, which includes using toilet, bedpan or urinal: Total  Taking care of personal grooming such as brushing teeth: None  Eating Meals: None  Daily Activity - Total Score: 15        ICU Mobility Screen  Early Mobility/Exercise Safety Screen: Proceed with mobilization - No exclusion criteria met,          Education Documentation  Body Mechanics, taught by Marcia Thomason OT at 3/1/2024  3:59 PM.  Learner: Patient  Readiness: Acceptance  Method: Explanation, Demonstration  Response: Verbalizes Understanding    Precautions, taught by Marcia Thomason OT at 3/1/2024  3:59 PM.  Learner: Patient  Readiness: Acceptance  Method: Explanation, Demonstration  Response: Verbalizes Understanding    Education Comments  No comments found.        Goals:   Encounter Problems       Encounter Problems (Active)       ADLs       Patient will complete daily grooming tasks in standing with LRAD with supervision level of assistance and PRN adaptive equipment.       Start:  03/01/24    Expected End:  03/15/24               BALANCE       Pt will increase dynamic standing tolerance to >10 min with supervision using LRAD during functional mobility/ADLs without LOB in order to improve activity tolerance and balance for self-care tasks.        Start:  03/01/24    Expected End:  03/15/24               COGNITION/SAFETY       Patient will participate in cognitive activities to demonstrate WFL score on further cognitive assessments, including Medi-Cog, MoCA and remain A&O x3, CAM (-).        Start:  03/01/24    Expected End:  03/15/24               EXERCISE/STRENGTHENING        Patient will be educated on BUE HEP for increased ADL/IADL performance.       Start:  03/01/24    Expected End:  03/15/24               MOBILITY       Patient will perform Functional mobility max Household distances/Community Distances with supervision level of assistance and least restrictive device in order to improve safety and functional mobility.       Start:  03/01/24    Expected End:  03/15/24                    03/01/24 at 4:12 PM   Marcia Thomason, OT   Rehab Office: [phone]

## 2024-03-01 NOTE — ANESTHESIA PREPROCEDURE EVALUATION
Patient: Charla Bowles    Procedure Information       Date/Time: 03/01/24 1800    Procedure: Impella VAD Insertion    Location: [facility] OR 19 / [facility] OR    Surgeons: Danny Viramontes MD PhD            Relevant Problems   Cardiovascular   (+) Chronic clinical systolic heart failure (CMS/HCC)   (+) Essential hypertension      Endocrine   (+) Acquired hypothyroidism   (+) Goiter diffuse, nontoxic   (+) Simple goiter      GI   (+) GERD (gastroesophageal reflux disease)      /Renal   (+) Anemia due to chronic kidney disease, on chronic dialysis (CMS/HCC)      Neuro/Psych   (+) Anxiety   (+) Carpal tunnel syndrome   (+) Depression with anxiety      Pulmonary   (+) Shortness of breath on exertion      Hematology   (+) Anemia due to chronic kidney disease, on chronic dialysis (CMS/HCC)      Musculoskeletal   (+) Carpal tunnel syndrome   (+) Lupus erythematosus      Infectious Disease   (+) Trichimoniasis   (+) Viral wart, unspecified   (+) Yeast vaginitis       Clinical information reviewed:   Tobacco  Allergies  Meds  Problems  Med Hx  Surg Hx  OB Status    Fam Hx  Soc Hx        NPO Detail:  No data recorded     Physical Exam    Airway  Mallampati: II  TM distance: >3 FB  Comments: Easy airway per history   Cardiovascular    Dental    Pulmonary    Abdominal            Anesthesia Plan    History of general anesthesia?: yes  History of complications of general anesthesia?: no    ASA 4 - emergent     general     The patient is not a current smoker.  Patient did not smoke on day of procedure.    intravenous induction   Postoperative administration of opioids is intended.  Anesthetic plan and risks discussed with patient.  Use of blood products discussed with patient who consented to blood products.    Plan discussed with resident.    Patient well known to me.    Plan general endotracheal anesthesia with large bore peripheral IV access, arterial line placement, central venous access, BOB, and  ASA standard monitors.   Postoperative mechanical ventilation with ICU stay also detailed to the patient.  The possibility of blood product transfusion was also described in detail.  Risks, benefits, alternatives of this plan were described in detail to the patient, who indicated understanding and agreed to proceed.    Arian Ha MD

## 2024-03-01 NOTE — PROGRESS NOTES
[facility]  ADVANCED HEART FAILURE AND TRANSPLANT CARDIOLOGY     Charla Bowles is a 33 y.o. female on day 9 of admission presenting with Cardiogenic shock (CMS/HCC).    INTERVAL EVENTS / PERTINENT ROS:    Overnight patient's Hg decreased due to left femoral oozing. 1xPRBC ordered.     Objective     Physical Exam  Constitutional:       General: She is not in acute distress.     Appearance: She is obese.   HENT:      Head: Normocephalic.      Comments: +Calhoun-Estevan catheter on right internal jugular, Trialysis line on left IJ     Mouth/Throat:      Mouth: Mucous membranes are moist.   Eyes:      Pupils: Pupils are equal, round, and reactive to light.   Cardiovascular:      Rate and Rhythm: Regular rhythm. Tachycardia present.      Pulses: Normal pulses.      Heart sounds: Normal heart sounds. No murmur heard.     No friction rub. No gallop.      Comments: Right femoral IABP in place set 1:2 and augmenting appropriately.   Pulmonary:      Effort: Pulmonary effort is normal. No accessory muscle usage or retractions.      Breath sounds: No wheezing, rhonchi or rales.      Comments: Breathing comfortable on RA. Equal chest rise bilaterally.   Abdominal:      General: Abdomen is flat. Bowel sounds are normal. There is no distension.      Palpations: Abdomen is soft. There is no mass.      Tenderness: There is no abdominal tenderness. There is no guarding.      Hernia: No hernia is present.   Musculoskeletal:      Cervical back: Full passive range of motion without pain.   Skin:     General: Skin is warm and dry.      Capillary Refill: Capillary refill takes less than 2 seconds.      Coloration: Skin is not jaundiced.      Findings: No laceration, rash or wound.   Neurological:      General: No focal deficit present.      Mental Status: She is alert.      Comments: Awake, alert, oriented x3, following commands appropriately.   Psychiatric:         Mood and Affect: Mood normal.          "Behavior: Behavior normal.       Last Recorded Vitals  Blood pressure 74/52, pulse (!) 143, temperature 37.1 °C (98.8 °F), temperature source Core, resp. rate (!) 40, height 1.549 m (5' 0.98\"), weight 91.3 kg (201 lb 4.5 oz), SpO2 100 %.  Intake/Output last 3 Shifts:  I/O last 3 completed shifts:  In: 3968.2 (43.5 mL/kg) [P.O.:1200; I.V.:956.2 (10.5 mL/kg); Blood:1012; IV Piggyback:800]  Out: 3005 (32.9 mL/kg) [Urine:2955 (0.9 mL/kg/hr); Blood:50]  Weight: 91.3 kg     Relevant Results  Scheduled medications  acetaminophen, 975 mg, oral, q6h  aspirin, 81 mg, oral, Daily  calcitriol, 0.5 mcg, oral, Daily  ferrous sulfate (325 mg ferrous sulfate), 65 mg of iron, oral, Daily with breakfast  [Held by provider] heparin (porcine), 5,000 Units, subcutaneous, q8h  hydrALAZINE, 25 mg, oral, TID  insulin lispro, 0-10 Units, subcutaneous, TID with meals  levothyroxine, 200 mcg, oral, Daily  lidocaine, 1 patch, transdermal, Daily  melatonin, 10 mg, oral, Nightly  multivitamin with minerals, 1 tablet, oral, Daily  nystatin, 5 mL, Swish & Swallow, q6h  oxymetazoline, 2 spray, Each Nostril, TID  pantoprazole, 40 mg, oral, Daily before breakfast  polyethylene glycol, 17 g, oral, BID  predniSONE, 10 mg, oral, Daily  rosuvastatin, 40 mg, oral, Nightly  sennosides-docusate sodium, 1 tablet, oral, Daily  sirolimus, 2.5 mg, oral, Daily  sodium chloride, 5 spray, Each Nostril, 4x daily  [Held by provider] sulfamethoxazole-trimethoprim, 80 mg of trimethoprim, oral, Daily  tacrolimus, 4 mg, oral, q12h TRICIA  [Held by provider] valGANciclovir, 450 mg, oral, q48h      Continuous medications  lactated Ringer's, 5 mL/hr, Last Rate: 5 mL/hr (03/01/24 1200)  lactated Ringer's, 10 mL/hr, Last Rate: 10 mL/hr (03/01/24 1200)  milrinone, 0.125 mcg/kg/min (Dosing Weight), Last Rate: 0.125 mcg/kg/min (03/01/24 1200)      PRN medications  PRN medications: calcium gluconate, calcium gluconate, dextrose, dextrose **OR** glucagon, glucagon, hydrALAZINE, " artificial tears, magnesium sulfate, magnesium sulfate, mupirocin, oxyCODONE, oxyCODONE, oxygen, potassium chloride, potassium chloride CR **OR** [DISCONTINUED] potassium chloride, traZODone    Results for orders placed or performed during the hospital encounter of 02/15/24 (from the past 24 hour(s))   Blood Gas Arterial Full Panel   Result Value Ref Range    POCT pH, Arterial 7.42 7.38 - 7.42 pH    POCT pCO2, Arterial 45 (H) 38 - 42 mm Hg    POCT pO2, Arterial 173 (H) 85 - 95 mm Hg    POCT SO2, Arterial 99 94 - 100 %    POCT Oxy Hemoglobin, Arterial 97.7 94.0 - 98.0 %    POCT Hematocrit Calculated, Arterial 26.0 (L) 36.0 - 46.0 %    POCT Sodium, Arterial 136 136 - 145 mmol/L    POCT Potassium, Arterial 4.0 3.5 - 5.3 mmol/L    POCT Chloride, Arterial 101 98 - 107 mmol/L    POCT Ionized Calcium, Arterial 1.18 1.10 - 1.33 mmol/L    POCT Glucose, Arterial 220 (H) 74 - 99 mg/dL    POCT Lactate, Arterial 1.0 0.4 - 2.0 mmol/L    POCT Base Excess, Arterial 4.2 (H) -2.0 - 3.0 mmol/L    POCT HCO3 Calculated, Arterial 29.2 (H) 22.0 - 26.0 mmol/L    POCT Hemoglobin, Arterial 8.8 (L) 12.0 - 16.0 g/dL    POCT Anion Gap, Arterial 10 10 - 25 mmo/L    Patient Temperature 37.0 degrees Celsius    FiO2 1 %   BLOOD GAS MIXED VENOUS FULL PANEL   Result Value Ref Range    POCT pH, Mixed 7.36 7.33 - 7.43 pH    POCT pCO2, Mixed 54 (H) 41 - 51 mm Hg    POCT pO2, Mixed 39 35 - 45 mm Hg    POCT SO2, Mixed 66 45 - 75 %    POCT Oxy Hemoglobin, Mixed 64.8 45.0 - 75.0 %    POCT Hematocrit Calculated, Mixed 26.0 (L) 36.0 - 46.0 %    POCT Sodium, Mixed 137 136 - 145 mmol/L    POCT Potassium, Mixed 4.0 3.5 - 5.3 mmol/L    POCT Chloride, Mixed 99 98 - 107 mmol/L    POCT Ionized Calcium, Mixed 1.20 1.10 - 1.33 mmol/L    POCT Glucose, Mixed 205 (H) 74 - 99 mg/dL    POCT Lactate, Mixed 0.8 0.4 - 2.0 mmol/L    POCT Base Excess, Mixed 4.3 (H) -2.0 - 3.0 mmol/L    POCT HCO3 Calculated, Mixed 30.5 (H) 22.0 - 26.0 mmol/L    POCT Hemoglobin, Mixed 8.5 (L)  12.0 - 16.0 g/dL    POCT Anion Gap, Mixed 12 10 - 25 mmo/L    Patient Temperature 37.0 degrees Celsius    FiO2 1 %   Magnesium   Result Value Ref Range    Magnesium 2.15 1.60 - 2.40 mg/dL   CBC   Result Value Ref Range    WBC 15.3 (H) 4.4 - 11.3 x10*3/uL    nRBC 0.0 0.0 - 0.0 /100 WBCs    RBC 2.79 (L) 4.00 - 5.20 x10*6/uL    Hemoglobin 8.2 (L) 12.0 - 16.0 g/dL    Hematocrit 24.5 (L) 36.0 - 46.0 %    MCV 88 80 - 100 fL    MCH 29.4 26.0 - 34.0 pg    MCHC 33.5 32.0 - 36.0 g/dL    RDW 14.9 (H) 11.5 - 14.5 %    Platelets 91 (L) 150 - 450 x10*3/uL   Renal function panel   Result Value Ref Range    Glucose 199 (H) 74 - 99 mg/dL    Sodium 139 136 - 145 mmol/L    Potassium 3.8 3.5 - 5.3 mmol/L    Chloride 96 (L) 98 - 107 mmol/L    Bicarbonate 26 21 - 32 mmol/L    Anion Gap 21 (H) 10 - 20 mmol/L    Urea Nitrogen 72 (H) 6 - 23 mg/dL    Creatinine 2.23 (H) 0.50 - 1.05 mg/dL    eGFR 29 (L) >60 mL/min/1.73m*2    Calcium 9.3 8.6 - 10.6 mg/dL    Phosphorus 4.7 2.5 - 4.9 mg/dL    Albumin 4.6 3.4 - 5.0 g/dL   Coagulation Screen   Result Value Ref Range    Protime 11.6 9.8 - 12.8 seconds    INR 1.0 0.9 - 1.1    aPTT 28 27 - 38 seconds   Transthoracic Echo (TTE) Limited   Result Value Ref Range    LV biplane EF 52 %    RV free wall pk S' 9.36 cm/s    LVIDd 4.40 cm    RVSP 35.3 mmHg    LV A4C EF 43.8    POCT GLUCOSE   Result Value Ref Range    POCT Glucose 170 (H) 74 - 99 mg/dL   Blood Gas Arterial Full Panel   Result Value Ref Range    POCT pH, Arterial 7.47 (H) 7.38 - 7.42 pH    POCT pCO2, Arterial 35 (L) 38 - 42 mm Hg    POCT pO2, Arterial 109 (H) 85 - 95 mm Hg    POCT SO2, Arterial 99 94 - 100 %    POCT Oxy Hemoglobin, Arterial 98.0 94.0 - 98.0 %    POCT Hematocrit Calculated, Arterial 22.0 (L) 36.0 - 46.0 %    POCT Sodium, Arterial 134 (L) 136 - 145 mmol/L    POCT Potassium, Arterial 4.8 3.5 - 5.3 mmol/L    POCT Chloride, Arterial 100 98 - 107 mmol/L    POCT Ionized Calcium, Arterial 1.13 1.10 - 1.33 mmol/L    POCT Glucose,  Arterial 216 (H) 74 - 99 mg/dL    POCT Lactate, Arterial 1.5 0.4 - 2.0 mmol/L    POCT Base Excess, Arterial 1.8 -2.0 - 3.0 mmol/L    POCT HCO3 Calculated, Arterial 25.5 22.0 - 26.0 mmol/L    POCT Hemoglobin, Arterial 7.2 (L) 12.0 - 16.0 g/dL    POCT Anion Gap, Arterial 13 10 - 25 mmo/L    Patient Temperature 37.0 degrees Celsius    FiO2 21 %   BLOOD GAS MIXED VENOUS FULL PANEL   Result Value Ref Range    POCT pH, Mixed 7.43 7.33 - 7.43 pH    POCT pCO2, Mixed 43 41 - 51 mm Hg    POCT pO2, Mixed 36 35 - 45 mm Hg    POCT SO2, Mixed 55 45 - 75 %    POCT Oxy Hemoglobin, Mixed 53.6 45.0 - 75.0 %    POCT Hematocrit Calculated, Mixed 22.0 (L) 36.0 - 46.0 %    POCT Sodium, Mixed 134 (L) 136 - 145 mmol/L    POCT Potassium, Mixed 4.7 3.5 - 5.3 mmol/L    POCT Chloride, Mixed 101 98 - 107 mmol/L    POCT Ionized Calcium, Mixed 1.15 1.10 - 1.33 mmol/L    POCT Glucose, Mixed 221 (H) 74 - 99 mg/dL    POCT Lactate, Mixed 1.4 0.4 - 2.0 mmol/L    POCT Base Excess, Mixed 3.8 (H) -2.0 - 3.0 mmol/L    POCT HCO3 Calculated, Mixed 28.5 (H) 22.0 - 26.0 mmol/L    POCT Hemoglobin, Mixed 7.4 (L) 12.0 - 16.0 g/dL    POCT Anion Gap, Mixed 9 (L) 10 - 25 mmo/L    Patient Temperature 37.0 degrees Celsius    FiO2 21 %   Lactate Dehydrogenase   Result Value Ref Range     (H) 84 - 246 U/L   Hepatic function panel   Result Value Ref Range    Albumin 4.8 3.4 - 5.0 g/dL    Bilirubin, Total 0.6 0.0 - 1.2 mg/dL    Bilirubin, Direct 0.2 0.0 - 0.3 mg/dL    Alkaline Phosphatase 35 33 - 110 U/L    ALT 29 7 - 45 U/L    AST 24 9 - 39 U/L    Total Protein 6.3 (L) 6.4 - 8.2 g/dL   BLOOD GAS MIXED VENOUS FULL PANEL   Result Value Ref Range    POCT pH, Mixed 7.41 7.33 - 7.43 pH    POCT pCO2, Mixed 44 41 - 51 mm Hg    POCT pO2, Mixed 40 35 - 45 mm Hg    POCT SO2, Mixed 63 45 - 75 %    POCT Oxy Hemoglobin, Mixed 61.6 45.0 - 75.0 %    POCT Hematocrit Calculated, Mixed 23.0 (L) 36.0 - 46.0 %    POCT Sodium, Mixed 136 136 - 145 mmol/L    POCT Potassium, Mixed 3.9  3.5 - 5.3 mmol/L    POCT Chloride, Mixed 101 98 - 107 mmol/L    POCT Ionized Calcium, Mixed 1.17 1.10 - 1.33 mmol/L    POCT Glucose, Mixed 83 74 - 99 mg/dL    POCT Lactate, Mixed 0.6 0.4 - 2.0 mmol/L    POCT Base Excess, Mixed 2.9 -2.0 - 3.0 mmol/L    POCT HCO3 Calculated, Mixed 27.9 (H) 22.0 - 26.0 mmol/L    POCT Hemoglobin, Mixed 7.5 (L) 12.0 - 16.0 g/dL    POCT Anion Gap, Mixed 11 10 - 25 mmo/L    Patient Temperature 37.0 degrees Celsius    FiO2 21 %   Calcium, Ionized   Result Value Ref Range    POCT Calcium, Ionized 1.13 1.1 - 1.33 mmol/L   CBC   Result Value Ref Range    WBC 8.1 4.4 - 11.3 x10*3/uL    nRBC 0.0 0.0 - 0.0 /100 WBCs    RBC 2.41 (L) 4.00 - 5.20 x10*6/uL    Hemoglobin 6.8 (L) 12.0 - 16.0 g/dL    Hematocrit 21.4 (L) 36.0 - 46.0 %    MCV 89 80 - 100 fL    MCH 28.2 26.0 - 34.0 pg    MCHC 31.8 (L) 32.0 - 36.0 g/dL    RDW 14.9 (H) 11.5 - 14.5 %    Platelets 80 (L) 150 - 450 x10*3/uL   Magnesium   Result Value Ref Range    Magnesium 2.23 1.60 - 2.40 mg/dL   Basic Metabolic Panel   Result Value Ref Range    Glucose 81 74 - 99 mg/dL    Sodium 138 136 - 145 mmol/L    Potassium 3.8 3.5 - 5.3 mmol/L    Chloride 97 (L) 98 - 107 mmol/L    Bicarbonate 26 21 - 32 mmol/L    Anion Gap 19 10 - 20 mmol/L    Urea Nitrogen 79 (H) 6 - 23 mg/dL    Creatinine 2.75 (H) 0.50 - 1.05 mg/dL    eGFR 23 (L) >60 mL/min/1.73m*2    Calcium 9.1 8.6 - 10.6 mg/dL   Phosphorus   Result Value Ref Range    Phosphorus 2.5 2.5 - 4.9 mg/dL   Blood Gas Arterial Full Panel   Result Value Ref Range    POCT pH, Arterial 7.45 (H) 7.38 - 7.42 pH    POCT pCO2, Arterial 35 (L) 38 - 42 mm Hg    POCT pO2, Arterial 102 (H) 85 - 95 mm Hg    POCT SO2, Arterial 98 94 - 100 %    POCT Oxy Hemoglobin, Arterial 96.6 94.0 - 98.0 %    POCT Hematocrit Calculated, Arterial 19.0 (L) 36.0 - 46.0 %    POCT Sodium, Arterial 137 136 - 145 mmol/L    POCT Potassium, Arterial 3.6 3.5 - 5.3 mmol/L    POCT Chloride, Arterial 103 98 - 107 mmol/L    POCT Ionized Calcium,  Arterial 1.14 1.10 - 1.33 mmol/L    POCT Glucose, Arterial 72 (L) 74 - 99 mg/dL    POCT Lactate, Arterial 0.5 0.4 - 2.0 mmol/L    POCT Base Excess, Arterial 0.3 -2.0 - 3.0 mmol/L    POCT HCO3 Calculated, Arterial 24.3 22.0 - 26.0 mmol/L    POCT Hemoglobin, Arterial 6.4 (LL) 12.0 - 16.0 g/dL    POCT Anion Gap, Arterial 13 10 - 25 mmo/L    Patient Temperature 37.0 degrees Celsius    FiO2 21 %   Prepare RBC: 1 Units   Result Value Ref Range    PRODUCT CODE [id number]     Unit Number [id number]     Unit ABO O     Unit RH POS     XM INTEP COMP     Dispense Status TR     Blood Expiration Date March 28, 2024 23:59 EDT     PRODUCT BLOOD TYPE 5100     UNIT VOLUME 350    Tacrolimus level   Result Value Ref Range    Tacrolimus  4.2 <=15.0 ng/mL   BLOOD GAS MIXED VENOUS FULL PANEL   Result Value Ref Range    POCT pH, Mixed 7.40 7.33 - 7.43 pH    POCT pCO2, Mixed 38 (L) 41 - 51 mm Hg    POCT pO2, Mixed 37 35 - 45 mm Hg    POCT SO2, Mixed 60 45 - 75 %    POCT Oxy Hemoglobin, Mixed 59.3 45.0 - 75.0 %    POCT Hematocrit Calculated, Mixed 25.0 (L) 36.0 - 46.0 %    POCT Sodium, Mixed 133 (L) 136 - 145 mmol/L    POCT Potassium, Mixed 4.7 3.5 - 5.3 mmol/L    POCT Chloride, Mixed 100 98 - 107 mmol/L    POCT Ionized Calcium, Mixed 1.17 1.10 - 1.33 mmol/L    POCT Glucose, Mixed 197 (H) 74 - 99 mg/dL    POCT Lactate, Mixed 0.8 0.4 - 2.0 mmol/L    POCT Base Excess, Mixed -1.1 -2.0 - 3.0 mmol/L    POCT HCO3 Calculated, Mixed 23.5 22.0 - 26.0 mmol/L    POCT Hemoglobin, Mixed 8.3 (L) 12.0 - 16.0 g/dL    POCT Anion Gap, Mixed 14 10 - 25 mmo/L    Patient Temperature 37.0 degrees Celsius    FiO2 21 %   CBC   Result Value Ref Range    WBC 9.7 4.4 - 11.3 x10*3/uL    nRBC 0.0 0.0 - 0.0 /100 WBCs    RBC 2.88 (L) 4.00 - 5.20 x10*6/uL    Hemoglobin 8.3 (L) 12.0 - 16.0 g/dL    Hematocrit 24.9 (L) 36.0 - 46.0 %    MCV 87 80 - 100 fL    MCH 28.8 26.0 - 34.0 pg    MCHC 33.3 32.0 - 36.0 g/dL    RDW 14.6 (H) 11.5 - 14.5 %    Platelets 89 (L) 150 - 450  x10*3/uL   POCT GLUCOSE   Result Value Ref Range    POCT Glucose 154 (H) 74 - 99 mg/dL   BLOOD GAS MIXED VENOUS FULL PANEL   Result Value Ref Range    POCT pH, Mixed 7.39 7.33 - 7.43 pH    POCT pCO2, Mixed 36 (L) 41 - 51 mm Hg    POCT pO2, Mixed 36 35 - 45 mm Hg    POCT SO2, Mixed 62 45 - 75 %    POCT Oxy Hemoglobin, Mixed 60.1 45.0 - 75.0 %    POCT Hematocrit Calculated, Mixed 25.0 (L) 36.0 - 46.0 %    POCT Sodium, Mixed 135 (L) 136 - 145 mmol/L    POCT Potassium, Mixed 4.4 3.5 - 5.3 mmol/L    POCT Chloride, Mixed 101 98 - 107 mmol/L    POCT Ionized Calcium, Mixed 1.15 1.10 - 1.33 mmol/L    POCT Glucose, Mixed 165 (H) 74 - 99 mg/dL    POCT Lactate, Mixed 0.7 0.4 - 2.0 mmol/L    POCT Base Excess, Mixed -2.8 (L) -2.0 - 3.0 mmol/L    POCT HCO3 Calculated, Mixed 21.8 (L) 22.0 - 26.0 mmol/L    POCT Hemoglobin, Mixed 8.2 (L) 12.0 - 16.0 g/dL    POCT Anion Gap, Mixed 17 10 - 25 mmo/L    Patient Temperature 37.0 degrees Celsius    FiO2 21 %     *Note: Due to a large number of results and/or encounters for the requested time period, some results have not been displayed. A complete set of results can be found in Results Review.         Assessment/Plan   Assessment: Ms. 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    Originally presented to [facility] ED on 2/15/24 with complaints of N/V x 3 days and inability to keep medications down. POCUS revealed acute systolic heart failure w/ severe BIV dysfunction when compared to previous images in 11/2023. Also noted to have HIRAM requiring CVVH and transaminitis. Immunosuppression notably below therapeutic range. Admitted to HFICU and treated for suspected acute  cellular vs. acute antibody mediated rejection; however, cardiac biopsy negative for signs of rejection. Despite rejection therapy, inotropes and MCS via IABP (2/18/24), the patient's end organ function continued to worsen without improvement in cardiac function. Ultimately placed on left femoral VA ECMO w/ Dr. Marina on 2/19/2024 and transferred to CTICU.     CTICU course complicated by anemia requiring blood product transfusions, epistaxis, volume overload & intubation (tachypnea and increased work of breathing 2/2 pulmonary edema). Status post extubation on 2/24/24. Now showing renal recovery off CVVH and cardiac allograft recovery s/p ECMO decannulation on 2/29/24 w/ Dr. Witt.       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)  Toxo: -/-   Hep C: -/-    Rejection/Prophylaxis (transplants):  - Steroids: Continue 10mg PO prednisone daily  - Tacrolimus: 4.0mg PO @ 0630 & 4.0mg PO @ 1830 w/ daily levels drawn @ 0600  - Serolimus: 2.5mg PO daily w/ daily levels drawn at 0600  - Tacrolimus goal troughs: 3-5  - Serolimus goal troughs: 7-9  - Combined sirolimus/tacrolimus goal of 10-12  - Antifungals: nystatin 500,000units Q6  - Antivirals: restart valcyte 450mg Q48hrs due to low grade viremia   - Anti PCP & Toxoplasmosis: Bactrim SS daily  --> Holding due to thrombocytopenia (2/23)    Last cardiac biopsy: 2/16/24 with ACR1 and no AMR  Last HLA: 2/16/24 negative for DSAs   Last RHC: 2/16/24 w/ RA 11, PAP 34/14(23), W 19 and C.I. 2.3  Last LHC: 2/18/24 negative for CAV and CAD   Last TTE/BOB: 2/29/24 shows improved LVEF to 45% and mild-mod RV dysfunction (post-ECMO  removal)  Osteopenia/osteoporosis prophylaxis: Vitamin D3 and calcium supplements  Peptic/gastric ulcer prophylaxis: Pantoprazole 40mg daily   CAV Prophylaxis: Aspirin 81mg daily & pravastatin daily    - Unclear cause of acute severe graft dysfunction. Most likely smoldering ACR vs. AMR; however, 2xallograft biopsies on 2/16 & 2/20 remain negative for any significant pathology. Notably, both biopsies taken after rejection therapies implemented which may have reduced areas of graft damage.    - DSAs remain negative; however, patient may have non-HLA antibodies present. 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   - Negative CAV & CAD via C on 2/18/24   - Completed methylpred steroid pulse w/ 1g Q24 x 3 days (2/16-2/18) and prednisone taper   - Thymoglobulin doses: 2/18 & 2/19   - IVIG + PLEX Session: 2/18 & 2/20   - Extubated on 2/24/24 w/ chest x-ray showing minimal pulmonary edema   - CVVH stopped on 2/27/24. HIRAM previously improving and autodiuresing; however, since ECMO decannulation, UOP has decreased and creatinine uptrending   - Graft function improving w/ LVEF 45% and mild-mod RV dysfunction (on milrinone 0.25 and IABP 1:1)  - Remains on MCS via right femoral IABP set 1:2 and augmenting appropriately  - Remains on Milrinone 0.25mcg/kg/min w/ borderline C.I. ~2.5, increasing CVP of 15-17, PAP 40/20, SvO2 63%, SVR 1200. Concerning for RV dysfunction.       Recommendations:  - Continue IABP at 1:2; may increase to 1:1 if C.I. decreases   - Increase milrinone to 0.375mcg/kg/min for RV support   - Agree with repeat echo to evaluate BIV function   - If RV decreased and IVC diameter increased, may benefit from diuresis   - Please start valcyte 450mg Q48hrs and continue to check CMVPCRs weekly (next due on 3/8/24)   - No changes to immunosuppression due to worsening renal status   - Agree with holding SQH due to ongoing transfusion requirements. Okay to hold aspirin as well.    - Plan for biopsy on 3/6/24  (will need case request placed next week)       Appreciate continued CTICU care/management.    Patient was examined and discussed with Advanced Heart Failure and Transplant Cardiology attending physician, Dr. UMA Padilla & CTICU team     Heart Transplant Team:   Epic Secure Chat  [phone] during day shift    Keith Jain CNP

## 2024-03-01 NOTE — PROGRESS NOTES
Charla Bowles is a 33 y.o. female on day 15 of admission presenting with Cardiogenic shock (CMS/HCC).    I have seen the patient either independently or with an associated resident physician or advanced practice provider    Overnight Events: Decannulated last evening. Post EF 44%. IABP 1: 1, Mil 125    Neuro: AaOx3, Cam -. Will work with PT this AM with tilt table. Trazadone for sleep  ENT: No recurrence of epistaxis  Cardiac: s/p VA ECMO for ACR of OHT. EF 44, SVO2 in 60s Wean IABP today to 1:2. Maintain Milrinone thru IABP pull. Hydral, statin/ASA   Pacer: None  Pulmonary: CXR well appearing. RA  Gastrointestinal: Tolerating a diet, PPI, bowel regimen  Renal: PT with slight uptick in Cr. Think this is secondary to poor PO, slight bleeding s/p decannulation  Endocrine: SSI 2, levothyroxine for [id number]  Hematology: Likely some blood loss s/p L arterial ecmo decan without primary repair. Will transfuse 1 unti today. Hold SQH  Infectious Disease: No active antimicrobials. Holding prophy  Immuno: Sirolimus/tacrolimus per levels 2.5 mg daily, 4 mg BID. Pred 10  Musculoskeletal: Left groin site with small hematoma    Lines/Tubes/Drains: PAC, MAC, a line, wheatley    Mechanical Circulatory Support: None    Prophylaxis: SCDs, PPI  Med to Discontinue: SQH    Disposition: CTICU    ABCDEF Checklist  Analgesia: Spontaneous awakening trial to be pursued if clinically appropriate. RASS goal reviewed  Breathing: Spontaneous breathing trial to be pursued if clinically appropriate. Mechanical power of assisted ventilation reviewed  Choice of analgesia/sedation: Analgesic and sedative agents adjusted per clinical context.   Delirium assessed by CAM, will avoid exacerbating factors  Early mobility and exercise: Physical and occupational therapy engaged  Family: Plan of care, overall trajectory of patient shared with family. Questions elicited and answered as appropriate.       Due to the high probability of life  threatening clinical decompensation, the patient required critical care time evaluating and managing this patient.  Critical care time included obtaining a history, examining the patient, ordering and reviewing studies, discussing, developing, and implementing a management plan, evaluating the patient's response to treatment, and discussion with other care team providers. I saw and evaluated the patient myself.  Critical care time was performed exclusive of billable procedures.    Critical care time: 48            Demetrio Murrell MD

## 2024-03-01 NOTE — PROGRESS NOTES
"Charla Bowles is a 33 y.o. female on day 15 of admission presenting with Cardiogenic shock (CMS/HCC).    Subjective   - De-cannulated yesterday (cannulation sites soft)  - 0.25 milrinone   - 1u pRBC         Objective     Physical Exam:   - CONSTITUTION: critically ill, young appearing female on IABP   - NEUROLOGIC: Alert and oriented, CAM negative. Moving all extremities with no focal deficits  - CARDIOVASCULAR: ST. Right femoral IABP 1:1 with appropriate augmentation. No hematoma or oozing noted.  - RESPIRATORY: RA and CTAB  - : Singh with clear, yellow urine.   - GI: Soft/ND/NT. Active BS.  - EXTREMITIES:  Right fem IABP, Left fem small hematoma NV exam intact x 4, no edema on exam.  - SKIN: WDI  - PSYCHIATRIC: Calm, appropriate    Last Recorded Vitals  Blood pressure 102/72, pulse (!) 136, temperature 36.9 °C (98.4 °F), temperature source Core, resp. rate (!) 33, height 1.549 m (5' 0.98\"), weight 91.3 kg (201 lb 4.5 oz), SpO2 99 %.  Intake/Output last 3 Shifts:  I/O last 3 completed shifts:  In: 3968.2 (43.5 mL/kg) [P.O.:1200; I.V.:956.2 (10.5 mL/kg); Blood:1012; IV Piggyback:800]  Out: 3005 (32.9 mL/kg) [Urine:2955 (0.9 mL/kg/hr); Blood:50]  Weight: 91.3 kg     Relevant Results  Scheduled medications  acetaminophen, 975 mg, oral, q6h  aspirin, 81 mg, oral, Daily  calcitriol, 0.5 mcg, oral, Daily  ferrous sulfate (325 mg ferrous sulfate), 65 mg of iron, oral, Daily with breakfast  heparin (porcine), 5,000 Units, subcutaneous, q8h  hydrALAZINE, 25 mg, oral, TID  insulin regular, 0-15 Units, subcutaneous, Before meals & nightly  insulin regular, 3 Units, subcutaneous, TID with meals  levothyroxine, 200 mcg, oral, Daily  lidocaine, 1 patch, transdermal, Daily  melatonin, 10 mg, oral, Nightly  multivitamin with minerals, 1 tablet, oral, Daily  nystatin, 5 mL, Swish & Swallow, q6h  oxymetazoline, 2 spray, Each Nostril, TID  pantoprazole, 40 mg, oral, Daily before breakfast  polyethylene glycol, 17 g, oral, " BID  predniSONE, 10 mg, oral, Daily  rosuvastatin, 40 mg, oral, Nightly  sennosides-docusate sodium, 1 tablet, oral, Daily  sirolimus, 2.5 mg, oral, Daily  sodium chloride, 5 spray, Each Nostril, 4x daily  [Held by provider] sulfamethoxazole-trimethoprim, 80 mg of trimethoprim, oral, Daily  tacrolimus, 4 mg, oral, q12h TRICIA  [Held by provider] valGANciclovir, 450 mg, oral, q48h      Continuous medications  lactated Ringer's, 5 mL/hr, Last Rate: 5 mL/hr (03/01/24 0700)  lactated Ringer's, 10 mL/hr, Last Rate: 10 mL/hr (03/01/24 0700)  milrinone, 0.125 mcg/kg/min (Dosing Weight), Last Rate: 0.125 mcg/kg/min (03/01/24 0612)      PRN medications  PRN medications: calcium gluconate, calcium gluconate, dextrose, dextrose **OR** glucagon, glucagon, hydrALAZINE, artificial tears, magnesium sulfate, magnesium sulfate, mupirocin, oxyCODONE, oxyCODONE, potassium chloride, potassium chloride CR **OR** [DISCONTINUED] potassium chloride, traZODone    Results for orders placed or performed during the hospital encounter of 02/15/24 (from the past 24 hour(s))   POCT GLUCOSE   Result Value Ref Range    POCT Glucose 164 (H) 74 - 99 mg/dL   Prepare Platelets: 2 Units   Result Value Ref Range    PRODUCT CODE D2810L02     Unit Number [id number]     Unit ABO A     Unit RH NEG     Dispense Status RE     Blood Expiration Date February 29, 2024 23:59 EST     PRODUCT BLOOD TYPE 0600     UNIT VOLUME 249    Prepare RBC: 2 Units   Result Value Ref Range    PRODUCT CODE G2016S07     Unit Number [id number]     Unit ABO O     Unit RH POS     XM INTEP COMP     Dispense Status XM     Blood Expiration Date April 01, 2024 23:59 EDT     PRODUCT BLOOD TYPE 5100     UNIT VOLUME 350     PRODUCT CODE Y4587Q64     Unit Number [id number]     Unit ABO O     Unit RH POS     XM INTEP COMP     Dispense Status XM     Blood Expiration Date March 27, 2024 23:59 EDT     PRODUCT BLOOD TYPE 5100     UNIT VOLUME 289    Prepare RBC: 4 Units   Result Value  Ref Range    PRODUCT CODE Y0330G20     Unit Number [id number]     Unit ABO O     Unit RH POS     XM INTEP COMP     Dispense Status XM     Blood Expiration Date April 02, 2024 23:59 EDT     PRODUCT BLOOD TYPE 5100     UNIT VOLUME 350     PRODUCT CODE T6451L72     Unit Number [id number]     Unit ABO O     Unit RH POS     XM INTEP COMP     Dispense Status XM     Blood Expiration Date April 02, 2024 23:59 EDT     PRODUCT BLOOD TYPE 5100     UNIT VOLUME 350     PRODUCT CODE D3506N23     Unit Number [id number]     Unit ABO O     Unit RH POS     XM INTEP COMP     Dispense Status XM     Blood Expiration Date April 02, 2024 23:59 EDT     PRODUCT BLOOD TYPE 5100     UNIT VOLUME 350     PRODUCT CODE M4131L75     Unit Number [id number]     Unit ABO O     Unit RH POS     XM INTEP COMP     Dispense Status XM     Blood Expiration Date April 02, 2024 23:59 EDT     PRODUCT BLOOD TYPE 5100     UNIT VOLUME 284    Blood Gas Arterial Full Panel Unsolicited   Result Value Ref Range    POCT pH, Arterial 7.35 (L) 7.38 - 7.42 pH    POCT pCO2, Arterial 60 (H) 38 - 42 mm Hg    POCT pO2, Arterial 128 (H) 85 - 95 mm Hg    POCT SO2, Arterial 98 94 - 100 %    POCT Oxy Hemoglobin, Arterial 97.5 94.0 - 98.0 %    POCT Hematocrit Calculated, Arterial 27.0 (L) 36.0 - 46.0 %    POCT Sodium, Arterial 138 136 - 145 mmol/L    POCT Potassium, Arterial 3.4 (L) 3.5 - 5.3 mmol/L    POCT Chloride, Arterial 99 98 - 107 mmol/L    POCT Ionized Calcium, Arterial 1.17 1.10 - 1.33 mmol/L    POCT Glucose, Arterial 196 (H) 74 - 99 mg/dL    POCT Lactate, Arterial 0.5 0.4 - 2.0 mmol/L    POCT Base Excess, Arterial 6.4 (H) -2.0 - 3.0 mmol/L    POCT HCO3 Calculated, Arterial 33.1 (H) 22.0 - 26.0 mmol/L    POCT Hemoglobin, Arterial 9.0 (L) 12.0 - 16.0 g/dL    POCT Anion Gap, Arterial 9 (L) 10 - 25 mmo/L    Patient Temperature 37.0 degrees Celsius    FiO2 50 %   Coox Panel, Arterial Unsolicited   Result Value Ref Range    POCT Hemoglobin, Arterial 9.0  (L) 12.0 - 16.0 g/dL    POCT Oxy Hemoglobin, Arterial 97.5 94.0 - 98.0 %    POCT Carboxyhemoglobin, Arterial 0.2 %    POCT Methemoglobin, Arterial 0.2 0.0 - 1.5 %    POCT Deoxy Hemoglobin, Arterial 2.1 0.0 - 5.0 %   Blood Gas Arterial Full Panel   Result Value Ref Range    POCT pH, Arterial 7.42 7.38 - 7.42 pH    POCT pCO2, Arterial 45 (H) 38 - 42 mm Hg    POCT pO2, Arterial 173 (H) 85 - 95 mm Hg    POCT SO2, Arterial 99 94 - 100 %    POCT Oxy Hemoglobin, Arterial 97.7 94.0 - 98.0 %    POCT Hematocrit Calculated, Arterial 26.0 (L) 36.0 - 46.0 %    POCT Sodium, Arterial 136 136 - 145 mmol/L    POCT Potassium, Arterial 4.0 3.5 - 5.3 mmol/L    POCT Chloride, Arterial 101 98 - 107 mmol/L    POCT Ionized Calcium, Arterial 1.18 1.10 - 1.33 mmol/L    POCT Glucose, Arterial 220 (H) 74 - 99 mg/dL    POCT Lactate, Arterial 1.0 0.4 - 2.0 mmol/L    POCT Base Excess, Arterial 4.2 (H) -2.0 - 3.0 mmol/L    POCT HCO3 Calculated, Arterial 29.2 (H) 22.0 - 26.0 mmol/L    POCT Hemoglobin, Arterial 8.8 (L) 12.0 - 16.0 g/dL    POCT Anion Gap, Arterial 10 10 - 25 mmo/L    Patient Temperature 37.0 degrees Celsius    FiO2 1 %   BLOOD GAS MIXED VENOUS FULL PANEL   Result Value Ref Range    POCT pH, Mixed 7.36 7.33 - 7.43 pH    POCT pCO2, Mixed 54 (H) 41 - 51 mm Hg    POCT pO2, Mixed 39 35 - 45 mm Hg    POCT SO2, Mixed 66 45 - 75 %    POCT Oxy Hemoglobin, Mixed 64.8 45.0 - 75.0 %    POCT Hematocrit Calculated, Mixed 26.0 (L) 36.0 - 46.0 %    POCT Sodium, Mixed 137 136 - 145 mmol/L    POCT Potassium, Mixed 4.0 3.5 - 5.3 mmol/L    POCT Chloride, Mixed 99 98 - 107 mmol/L    POCT Ionized Calcium, Mixed 1.20 1.10 - 1.33 mmol/L    POCT Glucose, Mixed 205 (H) 74 - 99 mg/dL    POCT Lactate, Mixed 0.8 0.4 - 2.0 mmol/L    POCT Base Excess, Mixed 4.3 (H) -2.0 - 3.0 mmol/L    POCT HCO3 Calculated, Mixed 30.5 (H) 22.0 - 26.0 mmol/L    POCT Hemoglobin, Mixed 8.5 (L) 12.0 - 16.0 g/dL    POCT Anion Gap, Mixed 12 10 - 25 mmo/L    Patient Temperature 37.0  degrees Celsius    FiO2 1 %   Magnesium   Result Value Ref Range    Magnesium 2.15 1.60 - 2.40 mg/dL   CBC   Result Value Ref Range    WBC 15.3 (H) 4.4 - 11.3 x10*3/uL    nRBC 0.0 0.0 - 0.0 /100 WBCs    RBC 2.79 (L) 4.00 - 5.20 x10*6/uL    Hemoglobin 8.2 (L) 12.0 - 16.0 g/dL    Hematocrit 24.5 (L) 36.0 - 46.0 %    MCV 88 80 - 100 fL    MCH 29.4 26.0 - 34.0 pg    MCHC 33.5 32.0 - 36.0 g/dL    RDW 14.9 (H) 11.5 - 14.5 %    Platelets 91 (L) 150 - 450 x10*3/uL   Renal function panel   Result Value Ref Range    Glucose 199 (H) 74 - 99 mg/dL    Sodium 139 136 - 145 mmol/L    Potassium 3.8 3.5 - 5.3 mmol/L    Chloride 96 (L) 98 - 107 mmol/L    Bicarbonate 26 21 - 32 mmol/L    Anion Gap 21 (H) 10 - 20 mmol/L    Urea Nitrogen 72 (H) 6 - 23 mg/dL    Creatinine 2.23 (H) 0.50 - 1.05 mg/dL    eGFR 29 (L) >60 mL/min/1.73m*2    Calcium 9.3 8.6 - 10.6 mg/dL    Phosphorus 4.7 2.5 - 4.9 mg/dL    Albumin 4.6 3.4 - 5.0 g/dL   Coagulation Screen   Result Value Ref Range    Protime 11.6 9.8 - 12.8 seconds    INR 1.0 0.9 - 1.1    aPTT 28 27 - 38 seconds   Transthoracic Echo (TTE) Limited   Result Value Ref Range    LV biplane EF 52 %    RV free wall pk S' 9.36 cm/s    LVIDd 4.40 cm    RVSP 35.3 mmHg    LV A4C EF 43.8    POCT GLUCOSE   Result Value Ref Range    POCT Glucose 170 (H) 74 - 99 mg/dL   Blood Gas Arterial Full Panel   Result Value Ref Range    POCT pH, Arterial 7.47 (H) 7.38 - 7.42 pH    POCT pCO2, Arterial 35 (L) 38 - 42 mm Hg    POCT pO2, Arterial 109 (H) 85 - 95 mm Hg    POCT SO2, Arterial 99 94 - 100 %    POCT Oxy Hemoglobin, Arterial 98.0 94.0 - 98.0 %    POCT Hematocrit Calculated, Arterial 22.0 (L) 36.0 - 46.0 %    POCT Sodium, Arterial 134 (L) 136 - 145 mmol/L    POCT Potassium, Arterial 4.8 3.5 - 5.3 mmol/L    POCT Chloride, Arterial 100 98 - 107 mmol/L    POCT Ionized Calcium, Arterial 1.13 1.10 - 1.33 mmol/L    POCT Glucose, Arterial 216 (H) 74 - 99 mg/dL    POCT Lactate, Arterial 1.5 0.4 - 2.0 mmol/L    POCT Base  Excess, Arterial 1.8 -2.0 - 3.0 mmol/L    POCT HCO3 Calculated, Arterial 25.5 22.0 - 26.0 mmol/L    POCT Hemoglobin, Arterial 7.2 (L) 12.0 - 16.0 g/dL    POCT Anion Gap, Arterial 13 10 - 25 mmo/L    Patient Temperature 37.0 degrees Celsius    FiO2 21 %   BLOOD GAS MIXED VENOUS FULL PANEL   Result Value Ref Range    POCT pH, Mixed 7.43 7.33 - 7.43 pH    POCT pCO2, Mixed 43 41 - 51 mm Hg    POCT pO2, Mixed 36 35 - 45 mm Hg    POCT SO2, Mixed 55 45 - 75 %    POCT Oxy Hemoglobin, Mixed 53.6 45.0 - 75.0 %    POCT Hematocrit Calculated, Mixed 22.0 (L) 36.0 - 46.0 %    POCT Sodium, Mixed 134 (L) 136 - 145 mmol/L    POCT Potassium, Mixed 4.7 3.5 - 5.3 mmol/L    POCT Chloride, Mixed 101 98 - 107 mmol/L    POCT Ionized Calcium, Mixed 1.15 1.10 - 1.33 mmol/L    POCT Glucose, Mixed 221 (H) 74 - 99 mg/dL    POCT Lactate, Mixed 1.4 0.4 - 2.0 mmol/L    POCT Base Excess, Mixed 3.8 (H) -2.0 - 3.0 mmol/L    POCT HCO3 Calculated, Mixed 28.5 (H) 22.0 - 26.0 mmol/L    POCT Hemoglobin, Mixed 7.4 (L) 12.0 - 16.0 g/dL    POCT Anion Gap, Mixed 9 (L) 10 - 25 mmo/L    Patient Temperature 37.0 degrees Celsius    FiO2 21 %   Lactate Dehydrogenase   Result Value Ref Range     (H) 84 - 246 U/L   Hepatic function panel   Result Value Ref Range    Albumin 4.8 3.4 - 5.0 g/dL    Bilirubin, Total 0.6 0.0 - 1.2 mg/dL    Bilirubin, Direct 0.2 0.0 - 0.3 mg/dL    Alkaline Phosphatase 35 33 - 110 U/L    ALT 29 7 - 45 U/L    AST 24 9 - 39 U/L    Total Protein 6.3 (L) 6.4 - 8.2 g/dL   BLOOD GAS MIXED VENOUS FULL PANEL   Result Value Ref Range    POCT pH, Mixed 7.41 7.33 - 7.43 pH    POCT pCO2, Mixed 44 41 - 51 mm Hg    POCT pO2, Mixed 40 35 - 45 mm Hg    POCT SO2, Mixed 63 45 - 75 %    POCT Oxy Hemoglobin, Mixed 61.6 45.0 - 75.0 %    POCT Hematocrit Calculated, Mixed 23.0 (L) 36.0 - 46.0 %    POCT Sodium, Mixed 136 136 - 145 mmol/L    POCT Potassium, Mixed 3.9 3.5 - 5.3 mmol/L    POCT Chloride, Mixed 101 98 - 107 mmol/L    POCT Ionized Calcium, Mixed  1.17 1.10 - 1.33 mmol/L    POCT Glucose, Mixed 83 74 - 99 mg/dL    POCT Lactate, Mixed 0.6 0.4 - 2.0 mmol/L    POCT Base Excess, Mixed 2.9 -2.0 - 3.0 mmol/L    POCT HCO3 Calculated, Mixed 27.9 (H) 22.0 - 26.0 mmol/L    POCT Hemoglobin, Mixed 7.5 (L) 12.0 - 16.0 g/dL    POCT Anion Gap, Mixed 11 10 - 25 mmo/L    Patient Temperature 37.0 degrees Celsius    FiO2 21 %   Calcium, Ionized   Result Value Ref Range    POCT Calcium, Ionized 1.13 1.1 - 1.33 mmol/L   CBC   Result Value Ref Range    WBC 8.1 4.4 - 11.3 x10*3/uL    nRBC 0.0 0.0 - 0.0 /100 WBCs    RBC 2.41 (L) 4.00 - 5.20 x10*6/uL    Hemoglobin 6.8 (L) 12.0 - 16.0 g/dL    Hematocrit 21.4 (L) 36.0 - 46.0 %    MCV 89 80 - 100 fL    MCH 28.2 26.0 - 34.0 pg    MCHC 31.8 (L) 32.0 - 36.0 g/dL    RDW 14.9 (H) 11.5 - 14.5 %    Platelets 80 (L) 150 - 450 x10*3/uL   Magnesium   Result Value Ref Range    Magnesium 2.23 1.60 - 2.40 mg/dL   Basic Metabolic Panel   Result Value Ref Range    Glucose 81 74 - 99 mg/dL    Sodium 138 136 - 145 mmol/L    Potassium 3.8 3.5 - 5.3 mmol/L    Chloride 97 (L) 98 - 107 mmol/L    Bicarbonate 26 21 - 32 mmol/L    Anion Gap 19 10 - 20 mmol/L    Urea Nitrogen 79 (H) 6 - 23 mg/dL    Creatinine 2.75 (H) 0.50 - 1.05 mg/dL    eGFR 23 (L) >60 mL/min/1.73m*2    Calcium 9.1 8.6 - 10.6 mg/dL   Phosphorus   Result Value Ref Range    Phosphorus 2.5 2.5 - 4.9 mg/dL   Blood Gas Arterial Full Panel   Result Value Ref Range    POCT pH, Arterial 7.45 (H) 7.38 - 7.42 pH    POCT pCO2, Arterial 35 (L) 38 - 42 mm Hg    POCT pO2, Arterial 102 (H) 85 - 95 mm Hg    POCT SO2, Arterial 98 94 - 100 %    POCT Oxy Hemoglobin, Arterial 96.6 94.0 - 98.0 %    POCT Hematocrit Calculated, Arterial 19.0 (L) 36.0 - 46.0 %    POCT Sodium, Arterial 137 136 - 145 mmol/L    POCT Potassium, Arterial 3.6 3.5 - 5.3 mmol/L    POCT Chloride, Arterial 103 98 - 107 mmol/L    POCT Ionized Calcium, Arterial 1.14 1.10 - 1.33 mmol/L    POCT Glucose, Arterial 72 (L) 74 - 99 mg/dL    POCT  Lactate, Arterial 0.5 0.4 - 2.0 mmol/L    POCT Base Excess, Arterial 0.3 -2.0 - 3.0 mmol/L    POCT HCO3 Calculated, Arterial 24.3 22.0 - 26.0 mmol/L    POCT Hemoglobin, Arterial 6.4 (LL) 12.0 - 16.0 g/dL    POCT Anion Gap, Arterial 13 10 - 25 mmo/L    Patient Temperature 37.0 degrees Celsius    FiO2 21 %   Prepare RBC: 1 Units   Result Value Ref Range    PRODUCT CODE Q7724H26     Unit Number [id number]     Unit ABO O     Unit RH POS     XM INTEP COMP     Dispense Status IS     Blood Expiration Date March 28, 2024 23:59 EDT     PRODUCT BLOOD TYPE 5100     UNIT VOLUME 350      *Note: Due to a large number of results and/or encounters for the requested time period, some results have not been displayed. A complete set of results can be found in Results Review.     This patient has a central line   Reason for the central line remaining today? Hemodynamic monitoring    This patient has a urinary catheter   Reason for the urinary catheter remaining today? critically ill patient who need accurate urinary output measurements         Assessment/Plan   Principal Problem:    Cardiogenic shock (CMS/[facility])  Active Problems:    Heart transplant recipient (CMS/[facility])    Encounter for aftercare following heart transplant (CMS/[facility])    Heart transplant as cause of abnormal reaction or later complication (CMS/[facility])    33 year old female with past medical history of heart transplant in March 2022 with postoperative course complicated by upper extremity/internal jugular DVTs, and 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 She was admitted to HFICU on 2/15 with concern for cardiogenic shock secondary to allograft rejection and decompensated heart failure with multiorgan dysfunction including significant elevation of liver enzymes and nonoliguric acute kidney injury. Endomyocardial biopsy on 2/16 revealed mild ACR with +CD4s and negative HLAs, however multisystem organ failure persisted with increased inotropic requirements. IABP placed  on 2/18. Transferred to CTICU on 2/19 for [facility] ECMO cannulation with Dr. Marina for persistent multi-organ system dysfunction. Intubated 2/21 for respiratory failure 2/2 pulmonary edema, extubated 2/24. ECMO de-cannulated 2/29/24.     Plan:  NEURO: No history. Neuro intact and CAM negative, RASS 0. Acute postoperative pain adequately controlled on current regimen. Previous insomnia 2/2 epistaxis, improved with melatonin and resolution of epistaxis.  -->  - Serial neuro and pain assessments  - Continue scheduled Tylenol 975 mg q6  - Continue oxy 5-10 mg q4 PRN for mod-severe pain  - PT/OT Consult  - Continue scheduled melatonin 10 mg nightly for sleep, Trazodone 50 mg PRN for persisting insomnia  - CAM ICU score qshift. Sleep/wake cycle hygiene     ENT: Pt with signficant epistaxisis overnight 2/28 requiring ENT consult ~ R anterior nare packed with surgicel with resolution.  - Afrin 3 x daily to bilateral nares x 3 days  - Nasal saline spray 5 sprays bilaterally x 10 days  - PRN mupirocin ointment to bilateral anterior nasal septum PRN     CV: Patient has a history of heart transplant in March of 2022 with TTE on 11/29 with LVEF 60-65%. Admitted for cardiogenic shock with concern for acute cellular rejection s/p IABP placement (R fem) 2/18 and VA ECMO (left fem) 2/19. ECHO 2/20 with persisting severe BiV dysfunction despite negative biopsy 2/20.  Echo with turn-down 2/22 EF 10%, severely reduced RV, mild AR and mild-moderate MR, Repeat turndown (0.8L) TTE 2/27 with LVEF 30% and moderate RV.   2/28 TTE with EF 30% and reduced RV (on 0L flow).  Cardiogenic shock persists but improving, remains on ECMO 3.1L flow/100%FiO2/sweep 0.5 and IABP 1:1 for mechanical support and epinephrine 0.02 mcg/kg/min for inotropic support.  SPA 9-10's and CVP 0-3.  Remains -130. Running hypertensive requiring clevidipine gtt but improving with addition of scheduled hydral. -->  - Maintain goal MAP 70-90  - Maintain IABP 1:1 (will try to  wean today as tolerated)  - Continue PO hydral 25 mg q8 and continue PRN hydral 10 mg for MAP >90 - Continue home rosuvastatin 40mg daily  - Continue ASA 81 mg daily  - Hold home amlodipine     VASC: Vascular surgery previously following for diminished pulses in LLE.  Now palpable DP bilaterally, grossly intact NV exam to BLE, and feet warm, absent LLE PT which is unchanged.   - Vascular surgery signed off     PULM: No history. Acute respiratory failure now resolved, intubated 2/21-2/24.  On RA with appropriate oxygenation/ventilation.  CXR poorly penetrated but stable, with minimal pulmonary edema. -->  - Daily CXR  - Maintain SPO2 > 92%     GI: History of gastric bypass and MMF colitis.  Passed beside swallow eval and tolerating PO.  Mild transaminitis downtrending.  -->  - Continue home PPI, calcitriol 0.5mg daily, multivitamin, calcium carbonate 1,250mg BID  - Controlled diet   - Continue miralax & senna-colace     : No history, baseline Cr 1.2-1.3.  Previous oliguric HIRAM requiring CVVH, likely in setting of cardiorenal physiology. Renal US from 2/19 unremarkable.  Cr remains slightly elevated but downtrending, and UOP remains acceptable~ likely 2/2 hypovolemia given significant autodiuresis over the past 72 hours. UOP /hr, net even with IVF bolus yesterday.  Mild hyponatremia resolved, hypokalemia replenished.  Appears euvolemic to hypovolemic on exam with globally low filling pressures. -->  - RFP as clinically indicated, replete electrolytes per CTICU protocol  - Holding off on diuresis unless trending significantly positive or filling pressures begin to show FVO  - Poss DC wheatley today     ENDO: History of hypothyroidism TSH 12.02, free thyroxine 2.19 (Endo previously consulted but now signed off). A1c: 6.3. Stress hyperglycemia slowly improving. -->  - Maintain BG <180 with hypoglycemia protocol  - Decrease SSI to lispro #2 ACHS  - DC prandial regular insulin 3u  - Continue Synthroid 200mcg daily  -  Follow up with primary Endo as outpatient for thyroid monitoring, per inpatient Endo recs      HEME: History of remote DVT. Acute on chronic iron deficiency anemia. Acute blood loss anemia, previously requiring serial transfusions while on systemic AC but hgb now grossly stable without active s/s of bleeding.  Stable thrombocytopenia. PF4 2/21 negative.  LDH and pre/post oxygenator gases acceptable. -->    - CBC as clinically indicated  - Continue daily ferrous sulfate  - SCDs and SQH for DVT prophylaxis  - Last type and screen: 2/28     Rejection/prophylaxis: S/p heart transplant 3/31/2022. Induction basiliximab. Donor HCV -, toxo -/-, CMV -/+. Mild ACR with +CD4 and negative HLAs however clinical presentation concern for AMR rejection.  PLEX/IVIG 2/17, 2/20. Thymo 2/18, 2/19. Repeat biopsy 2/20 with no evidence of on going rejection (ACR 0R, pAMR0 and no C3D or C4D).  - Continue prednisone 10mg daily  - Continue tacrolimus 4 mg BID with goal level 3-5  - Continue sirolimus 2.5 mg daily with goal level 7-9  - Plan for no further PLEX/IVIG or thymo  - Continue Nystatin suspension 500,000 units q6hr  - Hold bactrim and valcyte in setting of thrombocytopenia per Transplant  - CMV PCR ordered for Friday 3/1 per Transplant  - Biopsy planned for 3/6 - will need to be NPO at 0000     ID: Received Zosyn and Vancomycin on 2/15 in ED. Blood cultures from 2/15 negative. No leukocytosis and afebrile. -->  - Trend temp q4h     Skin: No active skin issues. All ECMO/IABP dressings CDI.  - Preventative Mepilex dressings in place on sacrum and heels  - Change preventative Mepilex weekly or more frequently as indicated (when moist/soiled)   - Every shift skin assessment per nursing and weekly ICU skin rounds  - Moisture barrier to be applied with christopher care  - Active skin problems addressed with nursing on daily rounds     Proph:  SCDs  SQH  Home PPI     G: Lines  Right radial arterial line placed 2/16  RIJ introducer with PAC placed  2/16  LIF Trialysis placed 2/17 (DC today if IABP pulled)  Right femoral IABP placed 2/18 (Goal to DC today if tolerated)       Code status: Full Code     Dispo: EMILY Hernandez, DO

## 2024-03-01 NOTE — PROCEDURES
Point-of-care ultrasound ordered for patient's history of heart failure with rising tachycardia rising creatinine.  Apical four-chamber parasternal long axis and parasternal short axis transthoracic views were obtained.  LV function moderately reduced with EF 30 to 40%.  Right ventricle mildly enlarged but shows okay systolic function.  Trivial pericardial effusion.  IVC with minimal respiratory variation and 2 cm.  Hepatic vein Doppler ultrasound with SD reversal, portal waveform with undulation both consistent with venous congestion.    Point-of-care cardiac echo shows cardiac activity is present, trivial pericardial effusion and reduced ejection fraction.  Venous access ultrasound shows evidence of moderate to severe venous congestion

## 2024-03-01 NOTE — PROGRESS NOTES
Physical Therapy    Physical Therapy Evaluation & Treatment    Patient Name: Charla Bowles  MRN: [id number]  Today's Date: 3/1/2024    Time In: 10:36  Time Out: 11:16  Time Calculation (min):  40    Assessment/Plan   PT Assessment  PT Assessment Results: Decreased strength, Decreased endurance, Impaired balance, Decreased mobility  Rehab Prognosis: Good  Evaluation/Treatment Tolerance: Patient tolerated treatment well  Medical Staff Made Aware: Yes  End of Session Communication: Bedside nurse  Assessment Comment: Pt ambulated 80' with FWW and was transferred to standing via use of tilt table; Pt with stable vitals throughout session. Pt will continue to benefit from skilled PT to improve functional mobility.  End of Session Patient Position: Bed, 3 rail up, Alarm off, not on at start of session   IP OR SWING BED PT PLAN  Inpatient or Swing Bed: Inpatient  PT Plan  Treatment/Interventions: Bed mobility, Transfer training, Gait training, Stair training, Balance training, Strengthening, Endurance training, Therapeutic exercise, Therapeutic activity  PT Plan: Skilled PT  PT Frequency: 4 times per week  PT Discharge Recommendations: Other (comment) (Anticipated DC to home after IABP removed; will continue to follow while in house)  PT Recommended Transfer Status: Assist x2  PT - OK to Discharge: Yes      Subjective     General Visit Information:  General  Reason for Referral: admitted to HFICU on 2/15 with concern for cardiogenic shock secondary to allograft rejection and decompensated heart failure with multiorgan dysfunction including significant elevation of liver enzymes and nonoliguric acute kidney injury. Endomyocardial biopsy on 2/16 revealed mild ACR with +CD4s and negative HLAs, however multisystem organ failure persisted with increased inotropic requirements. IABP placed on 2/18. Transferred to [facility]ICU on 2/19 for [facility] ECMO cannulation with Dr. 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 (Lines: IABP R LE, tele, A line, SWPADMINI, pulm a cath, wheatley, IV)  Home Living:  Home Living  Type of Home: House  Lives With:  (mother; 4 children)  Home Layout: Two level (Pt plans to stay on first floor in living room and sponge bathe)  Home Access: Stairs to enter with rails  Entrance Stairs-Number of Steps: 3  Bathroom Equipment: Bedside commode  Prior Level of Function:  Prior Function Per Pt/Caregiver Report  Level of [place]: Independent with ADLs and functional transfers  Vocational: On disability  Prior Function Comments: Drives  Precautions:  Precautions  Medical Precautions: Cardiac precautions, Fall precautions  Precautions Comment: Maintain goal MAP 70-90; Maintain IABP 1:2  Vital Signs:  Vital Signs  Heart Rate: (!) 140 (139)  Heart Rate Source: Monitor  SpO2: 99 % (99)  BP: 89/63 (100/62)  MAP (mmHg): 70 (77)  BP Method: Arterial line  Patient Position: Lying    Objective   Pain:  Pain Assessment  Pain Assessment: 0-10  Pain Score: 0 - No pain  Cognition:  Cognition  Orientation Level: Oriented X4  Insight:  (Pt demonstrating decreased insight to deficits; pt reporting that she will be going home in a few days)    General Assessments:    Activity Tolerance  Endurance: Tolerates 10 - 20 min exercise with multiple rests  Early Mobility/Exercise Safety Screen: Proceed with mobilization - No exclusion criteria met  Activity Tolerance Comments: Pt with good tolerance to session; stable vital signs throughout  bout of ambulation    Sensation  Sensation Comment: WFL    Strength  Strength Comments: Muscle strength assessed first with tilt table ~50 degrees elevation; pt performed hip flexion (limited to 30degrees on R hip) and knee flexion. Muscle strength assessed at full tilt table elevation prior to stepping off of table, and after stepping off of tilt table via standing march. Pt grossly at least 3/5 based on functional mobility; R LE observed to be weaker via decreased ability to flex knee/hip during muscle testing    Coordination  Coordination Comment: Decreased coordination in bilateral LE observed during muscle testing; R LE impaired> L LE    Static Standing Balance  Static Standing-Balance Support: Bilateral upper extremity supported  Static Standing-Level of Assistance: Minimum assistance (x2)  Functional Assessments:  Bed Mobility  Bed Mobility: Yes  Bed Mobility 1  Bed Mobility 1: Scooting  Level of Assistance 1: Dependent  Bed Mobility Comments 1: Dependent scooting to/from bed to tilt table via draw sheet    Transfers  Transfer: Yes (Refer to treatment section for details regarding tilt table)    Ambulation/Gait Training  Ambulation/Gait Training Performed: Yes  Ambulation/Gait Training 1  Surface 1: Level tile  Device 1: Rolling walker  Assistance 1: Minimum assistance (x2)  Quality of Gait 1: Forward flexed posture, Decreased step length  Comments/Distance (ft) 1: 80' (Increased assistance for guarding due to decreased coordination/strength in LE)  Extremity/Trunk Assessments:  Cervical Spine   Cervical Spine: Within Functional Limits  Lumbar Spine   Lumbar Spine : Within Functional Limits    RUE   RUE : Within Functional Limits  LUE   LUE: Within Functional Limits  RLE   RLE : Within Functional Limits  LLE   LLE : Within Functional Limits  Treatments:  Therapeutic Activity  Therapeutic Activity Performed: Yes  Therapeutic Activity 1: Increased time for advanced ICU line management required for functional  mobility and for tilt table management  Therapeutic Activity 2: IABP site checked multiple times throughout session with position changes; initially, observed blood under dressing, RN discussed that site was stable. Site remained stable throughout session; checked with various degrees of tilt, standing, during ambulation, and after scooting to bed in supine.  Therapeutic Activity 3: Transfer via tilt table: pt elevated in increments of ~20-30 degrees with ~2 minutes of monitoring for hemodynamic stability. Pt's vitals stable throughout elevation to standing on tilt table at 90 degrees  Therapeutic Activity 4: Pt requiring occasional FWW management during bout of ambulation for direction and during turns  Therapeutic Activity 5: Step up/step down from tilt table; Min A x 2; arm in arm assist and pt guided to use grab bars on tilt table for support  Outcome Measures:  [facility] Basic Mobility  Turning from your back to your side while in a flat bed without using bedrails: A lot  Moving from lying on your back to sitting on the side of a flat bed without using bedrails: A lot  Moving to and from bed to chair (including a wheelchair): A lot  Standing up from a chair using your arms (e.g. wheelchair or bedside chair): A lot  To walk in hospital room: A little  Climbing 3-5 steps with railing: A lot  Basic Mobility - Total Score: 13    FSS-ICU  Ambulation: Walks >/ or equal to 50 feet with any assistance x1  Rolling: Moderate assistance (performs 50 - 74% of task)  Sitting: Unable to perform  Transfer Sit-to-Stand: Unable to perform  Transfer Supine-to-Sit: Unable to perform  Total Score: 5    Encounter Problems       Encounter Problems (Active)       Balance       Pt will demonstrate improved sitting/standing static/dynamic balance activities without LOB for increase in safety prior to DC.  (Progressing)       Start:  03/01/24    Expected End:  03/15/24               Mobility       Pt will ambulate 200ft with appropriate  form, CGA or less, LRAD, and no LOB for safe DC.  (Progressing)       Start:  03/01/24    Expected End:  03/15/24            Pt will tolerate >15 minutes of upright standing activity without seated rest breaks with no changes in vital signs for improved functional mobility.  (Progressing)       Start:  03/01/24    Expected End:  03/15/24            Pt will ascend/descend 3 steps with HR with CGA or less in order to safely access her home upon DC.  (Progressing)       Start:  03/01/24    Expected End:  03/15/24               Transfers       Pt will perform bed mobility and sit<>stand transfers with CGA or less and use of LRAD to safety DC.  (Progressing)       Start:  03/01/24    Expected End:  03/15/24                   Education Documentation  Precautions, taught by MEE Hannon at 3/1/2024  2:11 PM.  Learner: Patient  Readiness: Acceptance  Method: Explanation  Response: Verbalizes Understanding    Body Mechanics, taught by MEE Hannon at 3/1/2024  2:11 PM.  Learner: Patient  Readiness: Acceptance  Method: Explanation  Response: Verbalizes Understanding    Home Exercise Program, taught by MEE Hannon at 3/1/2024  2:11 PM.  Learner: Patient  Readiness: Acceptance  Method: Explanation  Response: Verbalizes Understanding    Mobility Training, taught by MEE Hannon at 3/1/2024  2:11 PM.  Learner: Patient  Readiness: Acceptance  Method: Explanation  Response: Verbalizes Understanding    Education Comments  No comments found.    CANDIE Hannon  3/1/24
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
## 2024-03-01 NOTE — PROGRESS NOTES
Subjective Data:  Decannulated from ECMO last night. Remains with IABP, currently 1:2; team attempting to wean.     Objective Data:  Last Recorded Vitals:  Vitals:    03/01/24 1100 03/01/24 1200 03/01/24 1300 03/01/24 1400   BP:       Patient Position:       Pulse: (!) 141 (!) 135 (!) 139    Resp: (!) 29 (!) 36 (!) 29    Temp:   36.9 °C (98.4 °F) 36.9 °C (98.4 °F)   TempSrc:       SpO2: 98% 100% 99%    Weight:       Height:           Last Labs:  CBC - 3/1/2024: 11:31 AM  9.7 8.3 89    24.9      CMP - 3/1/2024: 12:54 PM  9.4 6.3 24 --- 0.6   2.6 5.2 29 35      PTT - 2/29/2024:  1:45 PM  1.0   11.6 28     TROPHS   Date/Time Value Ref Range Status   02/16/2024 01:16  0 - 34 ng/L Final     Comment:     Previous result verified on 2/16/2024 0018 on specimen/case [id number] called with component [facility] for procedure Troponin I, High Sensitivity with value 125 ng/L.   02/15/2024 10:03  0 - 34 ng/L Final   11/10/2022 11:22  0 - 34 ng/L Final     Comment:     .  Less than 99th percentile of normal range cutoff-  Female and children under 18 years old <35 ng/L; Male <54 ng/L: Negative  Repeat testing should be performed if clinically indicated.   .  Female and children under 18 years old  ng/L; Male  ng/L:  Consistent with possible cardiac damage and possible increased clinical   risk. Serial measurements may help to assess extent of myocardial damage.   .  >120 ng/L: Consistent with cardiac damage, increased clinical risk and  myocardial infarction. Serial measurements may help assess extent of   myocardial damage.   .   NOTE: Children less than 1 year old may have higher baseline troponin   levels and results should be interpreted in conjunction with the overall   clinical context.  .  NOTE: Troponin I testing is performed using a different   testing methodology at [facility] than at other   [facility] hospitals. Direct result comparisons should only   be made within the same  method.       BNP   Date/Time Value Ref Range Status   02/16/2024 01:16 AM >5,000 0 - 99 pg/mL Final   02/15/2024 10:03 PM >5,000 0 - 99 pg/mL Final     HGBA1C   Date/Time Value Ref Range Status   02/16/2024 01:16 AM 6.3 see below % Final   03/17/2023 08:38 AM 5.7 % Final     Comment:          Diagnosis of Diabetes-Adults   Non-Diabetic: < or = 5.6%   Increased risk for developing diabetes: 5.7-6.4%   Diagnostic of diabetes: > or = 6.5%  .       Monitoring of Diabetes                Age (y)     Therapeutic Goal (%)   Adults:          >18           <7.0   Pediatrics:    13-18           <7.5                   7-12           <8.0                   0- 6            7.5-8.5   American Diabetes Association. Diabetes Care 33(S1), Jan 2010.     12/14/2022 03:41 PM 6.4 % Final     Comment:          Diagnosis of Diabetes-Adults   Non-Diabetic: < or = 5.6%   Increased risk for developing diabetes: 5.7-6.4%   Diagnostic of diabetes: > or = 6.5%  .       Monitoring of Diabetes                Age (y)     Therapeutic Goal (%)   Adults:          >18           <7.0   Pediatrics:    13-18           <7.5                   7-12           <8.0                   0- 6            7.5-8.5   American Diabetes Association. Diabetes Care 33(S1), Jan 2010.       LDLCALC   Date/Time Value Ref Range Status   02/16/2024 01:16 AM 94 <=99 mg/dL Final     Comment:                                 Near   Borderline      AGE      Desirable  Optimal    High     High     Very High     0-19 Y     0 - 109     ---    110-129   >/= 130     ----    20-24 Y     0 - 119     ---    120-159   >/= 160     ----      >24 Y     0 -  99   100-129  130-159   160-189     >/=190       VLDL   Date/Time Value Ref Range Status   02/16/2024 01:16 AM 28 0 - 40 mg/dL Final   07/18/2023 09:24 AM 32 0 - 40 mg/dL Final   03/17/2023 08:38 AM 40 0 - 40 mg/dL Final   11/10/2022 11:22 PM 44 0 - 40 mg/dL Final      Last I/O:  I/O last 3 completed shifts:  In: 3968.2 (43.5 mL/kg)  [P.O.:1200; I.V.:956.2 (10.5 mL/kg); Blood:1012; IV Piggyback:800]  Out: 3005 (32.9 mL/kg) [Urine:2955 (0.9 mL/kg/hr); Blood:50]  Weight: 91.3 kg     Inpatient Medications:  Scheduled medications   Medication Dose Route Frequency    acetaminophen  975 mg oral q6h    aspirin  81 mg oral Daily    calcitriol  0.5 mcg oral Daily    ferrous sulfate (325 mg ferrous sulfate)  65 mg of iron oral Daily with breakfast    [Held by provider] heparin (porcine)  5,000 Units subcutaneous q8h    hydrALAZINE  25 mg oral TID    insulin lispro  0-10 Units subcutaneous TID with meals    levothyroxine  200 mcg oral Daily    lidocaine  1 patch transdermal Daily    melatonin  10 mg oral Nightly    multivitamin with minerals  1 tablet oral Daily    nystatin  5 mL Swish & Swallow q6h    oxymetazoline  2 spray Each Nostril TID    pantoprazole  40 mg oral Daily before breakfast    polyethylene glycol  17 g oral BID    predniSONE  10 mg oral Daily    rosuvastatin  40 mg oral Nightly    sennosides-docusate sodium  1 tablet oral Daily    sirolimus  2.5 mg oral Daily    sodium chloride  5 spray Each Nostril 4x daily    [Held by provider] sulfamethoxazole-trimethoprim  80 mg of trimethoprim oral Daily    tacrolimus  4 mg oral q12h TRICIA    [Held by provider] valGANciclovir  450 mg oral q48h     PRN medications   Medication    calcium gluconate    calcium gluconate    dextrose    dextrose    Or    glucagon    glucagon    hydrALAZINE    artificial tears    magnesium sulfate    magnesium sulfate    mupirocin    oxyCODONE    oxyCODONE    oxygen    potassium chloride    potassium chloride CR    traZODone     Continuous Medications   Medication Dose Last Rate    lactated Ringer's  5 mL/hr 5 mL/hr (03/01/24 1400)    lactated Ringer's  10 mL/hr 10 mL/hr (03/01/24 1400)    milrinone  0.125 mcg/kg/min (Dosing Weight) 0.25 mcg/kg/min (03/01/24 1400)     Physical Exam  Constitutional:       Appearance: She is ill-appearing.   Cardiovascular:      Rate and  Rhythm: Tachycardia present.      Pulses:           Dorsalis pedis pulses are 2+ on the right side and 2+ on the left side.      Comments: IABP inflate/ deflate auscultated  Pulmonary:      Effort: Pulmonary effort is normal.      Breath sounds: Normal breath sounds.      Comments: Diminished bases  Abdominal:      Palpations: Abdomen is soft.   Musculoskeletal:      Right lower le+ Edema present.      Left lower le+ Edema present.   Skin:     General: Skin is warm.   Neurological:      General: No focal deficit present.      Mental Status: She is alert and oriented to person, place, and time.        Assessment/Plan   Charla Bowles is a 33 year old female with past medical history of heart transplant in 2022 with postoperative course complicated by upper extremity/internal jugular DVTs, and asymptomatic 2R rejection in 2022. She was admitted to HFICU on 2/15 with concern for cardiogenic shock secondary to allograft rejection and decompensated heart failure with multiorgan dysfunction including significant elevation of liver enzymes and nonoliguric acute kidney injury. Endomyocardial biopsy on  revealed mild ACR with +CD4s and negative HLAs, however multisystem organ failure persisted with increased inotropic requirements. IABP placed on . Transferred to CTICU on  for VA ECMO cannulation with Dr. Marina for persistent multisystem dysfunction.     : IABP placed in cath lab, [facility] showed right dominant coronary circulation with no significant coronary artery disease, LVEDP : multi-disciplinary review convened; given persistent requirement for pharmacological and mechanical support, and with the addition of oliguric/anuric renal insufficiency, decision made to transfer to CTICU and initiate VA ECMO    : pending endomyocardial biopsy today for further investigation of allograft dysfunction    -: on VA ecmo, with IABP, CRRT, intubated and on  vasopressors    2/23: ECMO wean down trial; considering extubation, considering transition to Impella 5.5     2/26 - Wean VA-ECMO and possible decannulation today. Plan for possible Impella 5.5 placement     2/27  HF Team rounds:  Repeat echo today shows improved LVEF to 30% with RV moderately dysfunctional; overall improved since admission.  - Will plan for ECMO decannulation +/- IABP or Impella 5.5 step-down therapy in next 24-48hrs.   - Continue high flow ECMO until decannulation given anticoagulation being held due to ongoing anemia + transfusion requirements      Groins no swelling and legs/feet warm    2/29 Decannulated from ECMO    3/1  - IABP 1:2, attempting to wean     Cardiogenic shock 2/2 suspected allograft rejection  S/p OHT 3/2022  - Intra- aortic balloon pump placed on 2/18, VA ECMO cannulation 2/19, decannulated 2/29  - CXR placement satisfactory assess   - Current IABP settings: 1:2, augmentation 86, assisted BP 92/78 site with no hematoma/bleeding   - Plan to wean as hemodynamics per primary team     Recommendations:  - daily CXR with IABP  - please maintain strict bedrest while IABP in place  - closely monitor groin insertion site and distal pulses  - May elevate HOB no greater than 30 degrees  - May log roll patient side to side without flexing involved extremity  - Interventional cardiology will continue to follow, will defer primary care to CICU team     Full Code    Interventional Cardiology will follow if IABP remains in place.     Brandee Herrera, GOLDIE  Interventional Cardiology     General Cardiology Consult Pager: [phone] (weekday 7AM-6PM and weekend 7AM-2PM)and other: [phone]  EP Consult Pager: [phone] (weekday 7AM-6PM and weekend 7AM-2PM) and other: [phone]  EP Device Nurse Pager: [phone] (weekday 7AM-4PM)  Advanced Heart Failure Consult Pager: [phone] anytime  CICU Fellow Pager: [phone] anytime  Endovascular /Limb Salvage Team Pager: [phone] for day coverage (8am-5pm)  Interventional Cardiology Fellow  Pager: [phone] (7AM-5PM) Night coverage: [facility] Cross Cover [id number]  Structural Heart Team Pager: [phone] anytime  Night coverage: [facility] [id number]      Brandee Herrera, APRN-CNP

## 2024-03-01 NOTE — PROGRESS NOTES
Brief Attending Summary:   Ms. 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     Originally presented to [facility] ED on 2/15/24 with complaints of N/V x 3 days and inability to keep medications down. POCUS revealed acute systolic heart failure w/ severe BIV dysfunction when compared to previous images in 11/2023. Also noted to have HIRAM requiring CVVH and transaminitis. Immunosuppression notably below therapeutic range. Admitted to HFICU and treated for suspected acute cellular vs. acute antibody mediated rejection; however, cardiac biopsy negative for signs of rejection. Despite rejection therapy, inotropes and MCS via IABP (2/18/24), the patient's end organ function continued to worsen without improvement in cardiac function. Ultimately placed on left femoral VA ECMO w/ Dr. Marina on 2/19/2024 and transferred to CTICU.      CTICU course complicated by anemia requiring blood product transfusions, volume overload & intubation (tachypnea and increased work of breathing 2/2 pulmonary edema). Status post extubation on 2/24. Now showing renal recovery off CVVH and cardiac allograft recovery.         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  #Acute renal failure 2/2 to cardiorenal syndrome - improving  #Acute transaminitis - improved and stabilized      Donor/Recipient Infectious history:  CMV: -/+ (last collected 2/23/24)  Toxo: -/-   Hep C: -/-     Rejection/Prophylaxis (transplants):  - Steroids: Continue 10mg PO prednisone daily  - Tacrolimus: 3.0mg PO @ 0630 & 3.0mg PO @ 1830 w/  daily levels drawn @ 0600  - Serolimus: 2.5mg PO daily w/ daily levels drawn at 0600  - Tacrolimus goal troughs: 3-5  - Serolimus goal troughs: 7-9  - Combined sirolimus/tacrolimus goal of 10-12  - Antifungals: nystatin 500,000units Q6  - Antivirals: valcyte 450mg Q48hrs --> Holding due to thrombocytopenia (2/23)  - Anti PCP & Toxoplasmosis: Bactrim SS daily  --> Holding due to thrombocytopenia (2/23)     Last cardiac biopsy: 2/16/24 with ACR1 and no AMR  Last HLA: 2/16/24 negative for DSAs   Last RHC: 2/16/24 w/ RA 11, PAP 34/14(23), W 19 and C.I. 2.3  Last LHC: 2/18/24 negative for CAV and CAD   Last TTE/BOB: 2/28/24 shows improved LVEF to 30% and mild-mod RV dysfunction (ECMO turndown)   Osteopenia/osteoporosis prophylaxis: Vitamin D3 and calcium supplements  Peptic/gastric ulcer prophylaxis: Pantoprazole 40mg daily   CAV Prophylaxis: Aspirin 81mg daily & pravastatin daily     - Unclear cause of acute severe graft dysfunction. Most likely smoldering ACR vs. AMR; however, 2xallograft biopsies on 2/16 & 2/20 remain negative for any significant pathology. Notably, both biopsies taken after rejection therapies implemented which may have reduced areas of graft damage.    - DSAs remain negative; however, patient may have non-HLA antibodies present. 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   - Negative CAV & CAD via LHC on 2/18/24   - Completed methylpred steroid pulse w/ 1g Q24 x 3 days (2/16-2/18) and prednisone taper   - Thymoglobulin doses: 2/18 & 2/19   - IVIG + PLEX Session: 2/18 & 2/20   - Extubated on 2/24/24 w/ chest x-ray showing minimal pulmonary edema   - CVVH stopped on 2/27/24; now responding to IV diuretics    - LFTs stabilized and lactate normalized.    - Remains on MCS via right femoral IABP set 1:1 and augmenting appropriately & left femoral VA ECMO w/ appropriate flows ~3.0L/FIO2 100%/sweep0.5.  - Epinephrine 0.02mcg/kg/min and cleviprex for afterload reduction   - Hemodynamics appear  appropriate w/ formal ECMO turn down trial to no flow, w/ EF ~30% and mild-moderate RV dysfunction.      Recommendations:  - Agree with plan for ECMO decannulation +/- IABP or Impella 5.5 step-down therapy  - Continue high flow ECMO until decannulation given anticoagulation being held due to ongoing anemia, epistaxis and transfusion requirements   - Agree with gentle fluid resuscitation given decreased filling pressures   - Please continue to obtain CMV PCR weekly while holding valcyte (last negative on 2/23/24. Next level on 3/1/24)     I have reviewed and evaluated the most recent data and results, personally examined the patient, and formulated the plan of care as presented above. This patient was critically ill and required continued critical care treatment. Teaching and any separately billable procedures are not included in the time calculation.    Billing Provider Critical Care Time: 45 minutes    Kymberly Padilla MD MPH

## 2024-03-01 NOTE — SIGNIFICANT EVENT
Charla Bowles is a 33 y.o. female on day 15 of admission presenting with Cardiogenic shock (CMS/[facility]).    Patient was decannulated from VA ECMO yesterday.  Overnight patient with rising creatinine and reduced urine output.  Patient also post decannulation had an increase in her sinus tachycardia from 1 30-1 40 consistently.  Throughout the day patient cardiac index decreased and creatinine worsened as well as poor urine output.  Patient with relatively sustained SVO twos and no lactic acidemia.  Bedside and formal echo show an ejection fraction of 30 to 35%.  Patient's ejection fraction was 44% post cannulation yesterday.  Patient placed on epinephrine in addition to the milrinone.  Decision made with multidisciplinary discussion with cardiothoracic surgery and heart failure the patient would likely benefit from Impella 5.5.  Patient going to the operating room with Dr. Marina for implantation    Demetroi Murrell MD

## 2024-03-02 NOTE — SIGNIFICANT EVENT
03/02/24 1425   Daily Screen   Total RSBI 79   ETT  7 mm   Placement Date/Time: 03/01/24 (c) 3680   Mask Ventilation: Vent by mask + OA or adjuvant +/- NMBA  Technique: Direct laryngoscopy  ETT Type: ETT - single  Single Lumen Tube Size: 7 mm  Cuffed: Yes  Laryngoscope: Purvi  Blade Size: 4  Location: Ora...   Secured at (cm) 23 cm   Measured from Lips   Secured Location Center   Secured by Commercial tube torrez   Weaning Parameters   Weaning Vital Capacity 574 mL   Negative Inspiratory Force (NIF) -24   Spontaneous Minute Volume (MV) 9.9   Weaning Tidal Volume 348 mL

## 2024-03-02 NOTE — ANESTHESIA POSTPROCEDURE EVALUATION
Patient: Charla Bowles    Procedure Summary       Date: 03/01/24 Room / Location: [facility] OR 19 / [facility] OR    Anesthesia Start: 1826 Anesthesia Stop: 2133    Procedure: Impella VAD Insertion (Right: Axilla) Diagnosis:       Cardiogenic shock (CMS/HCC)      (Cardiogenic shock (CMS/HCC) [R57.0])    Surgeons: Danny Viramontes MD PhD Responsible Provider: Arian Ha MD    Anesthesia Type: general ASA Status: 4 - Emergent            Anesthesia Type: general    Vitals Value Taken Time   /92 03/01/24 2133   Temp 36 03/01/24 2133   Pulse 109 03/01/24 2132   Resp 16 03/01/24 2132   SpO2 100 % 03/01/24 2126   Vitals shown include unvalidated device data.    Anesthesia Post Evaluation    Patient location during evaluation: ICU  Patient participation: complete - patient cannot participate  Level of consciousness: sedated  Pain management: adequate  Airway patency: patent  Cardiovascular status: tachycardic, hemodynamically stable and acceptable  Respiratory status: ETT  Hydration status: acceptable  Postoperative Nausea and Vomiting: none        No notable events documented.

## 2024-03-02 NOTE — PROGRESS NOTES
"[facility]  ADVANCED HEART FAILURE AND TRANSPLANT CARDIOLOGY     Charla Bowles is a 33 y.o. female on day 9 of admission presenting with Cardiogenic shock (CMS/HCC).    INTERVAL EVENTS / PERTINENT ROS:    IABP removed yesterday and sheath pulled. R axillary impella 5.5 placed at P8. Pt remains intubated.     Objective     Physical Exam  Constitutional:       General: She is not in acute distress.     Appearance: She is obese.      Comments: Sedated   HENT:      Head: Normocephalic.      Comments: +[place]-Estevan catheter on right internal jugular, Trialysis line on left IJ     Mouth/Throat:      Mouth: Mucous membranes are moist.   Eyes:      Pupils: Pupils are equal, round, and reactive to light.   Cardiovascular:      Rate and Rhythm: Regular rhythm. Tachycardia present.      Pulses: Normal pulses.      Heart sounds: Normal heart sounds. No murmur heard.     No friction rub. No gallop.      Comments: R axillary 5.5 impella.   Pulmonary:      Effort: Pulmonary effort is normal. No accessory muscle usage or retractions.      Breath sounds: No wheezing, rhonchi or rales.      Comments: Equal chest rise bilaterally.   Abdominal:      General: Abdomen is flat. Bowel sounds are normal. There is no distension.      Palpations: Abdomen is soft. There is no mass.      Tenderness: There is no abdominal tenderness. There is no guarding.      Hernia: No hernia is present.   Musculoskeletal:      Cervical back: Full passive range of motion without pain.   Skin:     General: Skin is warm and dry.      Capillary Refill: Capillary refill takes less than 2 seconds.      Coloration: Skin is not jaundiced.      Findings: No laceration, rash or wound.   Neurological:      Comments: Intubated, sedated       Last Recorded Vitals  Blood pressure 102/82, pulse (!) 126, temperature 37.4 °C (99.3 °F), temperature source Core, resp. rate 26, height 1.549 m (5' 0.98\"), weight 91.3 kg (201 lb 4.5 oz), SpO2 100 " %.  Intake/Output last 3 Shifts:  I/O last 3 completed shifts:  In: 3952.7 (43.3 mL/kg) [P.O.:640; I.V.:1240.7 (13.6 mL/kg); Blood:1072; IV Piggyback:1000]  Out: 1730 (18.9 mL/kg) [Urine:1110 (0.3 mL/kg/hr); Drains:20; Blood:600]  Weight: 91.3 kg     Relevant Results  Scheduled medications  acetaminophen, 975 mg, oral, q6h  aspirin, 81 mg, oral, Daily  calcitriol, 0.5 mcg, oral, Daily  ferrous sulfate (325 mg ferrous sulfate), 65 mg of iron, oral, Daily with breakfast  glycopyrrolate, , ,   heparin (porcine), 5,000 Units, subcutaneous, q8h  hydrALAZINE, 25 mg, oral, q8h  insulin lispro, 0-10 Units, subcutaneous, TID with meals  levothyroxine, 200 mcg, oral, Daily  lidocaine, 1 patch, transdermal, Daily  melatonin, 10 mg, oral, Nightly  multivitamin with minerals, 1 tablet, oral, Daily  neostigmine, , ,   nystatin, 5 mL, Swish & Swallow, q6h  [Held by provider] pantoprazole, 40 mg, oral, Daily before breakfast  polyethylene glycol, 17 g, oral, BID  predniSONE, 10 mg, oral, Daily  rosuvastatin, 40 mg, oral, Nightly  [START ON 3/3/2024] sennosides-docusate sodium, 2 tablet, oral, Daily  sirolimus, 2.5 mg, oral, Daily  sodium chloride, 5 spray, Each Nostril, 4x daily  [Held by provider] sulfamethoxazole-trimethoprim, 80 mg of trimethoprim, oral, Daily  tacrolimus, 4 mg, oral, q12h TRICIA  valGANciclovir, 450 mg, oral, q48h  valGANciclovir, 450 mg, oral, q48h      Continuous medications  dexmedeTOMIDine, 0.2-1.5 mcg/kg/hr, Last Rate: 0.4 mcg/kg/hr (03/02/24 1100)  lactated Ringer's, 5 mL/hr, Last Rate: 5 mL/hr (03/02/24 1100)  lactated Ringer's, 10 mL/hr, Last Rate: 10 mL/hr (03/02/24 1100)  milrinone, 0.375 mcg/kg/min (Dosing Weight), Last Rate: 0.375 mcg/kg/min (03/02/24 1100)  sodium bicarbonate infusion Impella Purge 25 mEq/1000 mL D5W, 10 mL/hr      PRN medications  PRN medications: bisacodyl, calcium gluconate, calcium gluconate, dextrose, dextrose **OR** glucagon, glucagon, glycopyrrolate, hydrALAZINE, HYDROmorphone,  artificial tears, magnesium sulfate, magnesium sulfate, mupirocin, neostigmine, oxyCODONE, oxyCODONE, oxygen, oxygen, potassium chloride, traZODone    Results for orders placed or performed during the hospital encounter of 02/15/24 (from the past 24 hour(s))   Calcium, Ionized   Result Value Ref Range    POCT Calcium, Ionized 1.13 1.1 - 1.33 mmol/L   Renal function panel   Result Value Ref Range    Glucose 178 (H) 74 - 99 mg/dL    Sodium 137 136 - 145 mmol/L    Potassium 4.7 3.5 - 5.3 mmol/L    Chloride 99 98 - 107 mmol/L    Bicarbonate 20 (L) 21 - 32 mmol/L    Anion Gap 23 (H) 10 - 20 mmol/L    Urea Nitrogen 86 (H) 6 - 23 mg/dL    Creatinine 3.62 (H) 0.50 - 1.05 mg/dL    eGFR 16 (L) >60 mL/min/1.73m*2    Calcium 9.4 8.6 - 10.6 mg/dL    Phosphorus 2.6 2.5 - 4.9 mg/dL    Albumin 5.2 (H) 3.4 - 5.0 g/dL   Magnesium   Result Value Ref Range    Magnesium 2.28 1.60 - 2.40 mg/dL   Transthoracic Echo (TTE) Limited   Result Value Ref Range    LVIDd 4.60 cm   POCT GLUCOSE   Result Value Ref Range    POCT Glucose 163 (H) 74 - 99 mg/dL   Prepare Platelets: 1 Units   Result Value Ref Range    PRODUCT CODE S6972C80     Unit Number [id number]     Unit ABO B     Unit RH POS     Dispense Status TR     Blood Expiration Date March 02, 2024 23:59 EST     PRODUCT BLOOD TYPE 7300     UNIT VOLUME 272    Blood Gas Arterial Full Panel Unsolicited   Result Value Ref Range    POCT pH, Arterial 7.48 (H) 7.38 - 7.42 pH    POCT pCO2, Arterial 29 (L) 38 - 42 mm Hg    POCT pO2, Arterial 293 (H) 85 - 95 mm Hg    POCT SO2, Arterial 99 94 - 100 %    POCT Oxy Hemoglobin, Arterial 97.7 94.0 - 98.0 %    POCT Hematocrit Calculated, Arterial 25.0 (L) 36.0 - 46.0 %    POCT Sodium, Arterial 132 (L) 136 - 145 mmol/L    POCT Potassium, Arterial 4.8 3.5 - 5.3 mmol/L    POCT Chloride, Arterial 99 98 - 107 mmol/L    POCT Ionized Calcium, Arterial 1.17 1.10 - 1.33 mmol/L    POCT Glucose, Arterial 172 (H) 74 - 99 mg/dL    POCT Lactate, Arterial 1.2 0.4 - 2.0  mmol/L    POCT Base Excess, Arterial -1.4 -2.0 - 3.0 mmol/L    POCT HCO3 Calculated, Arterial 21.6 (L) 22.0 - 26.0 mmol/L    POCT Hemoglobin, Arterial 8.3 (L) 12.0 - 16.0 g/dL    POCT Anion Gap, Arterial 16 10 - 25 mmo/L    Patient Temperature 37.0 degrees Celsius    FiO2 30 %   Coox Panel, Arterial Unsolicited   Result Value Ref Range    POCT Hemoglobin, Arterial 8.3 (L) 12.0 - 16.0 g/dL    POCT Oxy Hemoglobin, Arterial 97.7 94.0 - 98.0 %    POCT Carboxyhemoglobin, Arterial 0.5 %    POCT Methemoglobin, Arterial 0.3 0.0 - 1.5 %    POCT Deoxy Hemoglobin, Arterial 1.5 0.0 - 5.0 %   Blood Gas Arterial Full Panel   Result Value Ref Range    POCT pH, Arterial 7.35 (L) 7.38 - 7.42 pH    POCT pCO2, Arterial 37 (L) 38 - 42 mm Hg    POCT pO2, Arterial 203 (H) 85 - 95 mm Hg    POCT SO2, Arterial 100 94 - 100 %    POCT Oxy Hemoglobin, Arterial 98.1 (H) 94.0 - 98.0 %    POCT Hematocrit Calculated, Arterial 24.0 (L) 36.0 - 46.0 %    POCT Sodium, Arterial 132 (L) 136 - 145 mmol/L    POCT Potassium, Arterial 4.3 3.5 - 5.3 mmol/L    POCT Chloride, Arterial 99 98 - 107 mmol/L    POCT Ionized Calcium, Arterial 1.17 1.10 - 1.33 mmol/L    POCT Glucose, Arterial 173 (H) 74 - 99 mg/dL    POCT Lactate, Arterial 0.9 0.4 - 2.0 mmol/L    POCT Base Excess, Arterial -4.8 (L) -2.0 - 3.0 mmol/L    POCT HCO3 Calculated, Arterial 20.4 (L) 22.0 - 26.0 mmol/L    POCT Hemoglobin, Arterial 8.1 (L) 12.0 - 16.0 g/dL    POCT Anion Gap, Arterial 17 10 - 25 mmo/L    Patient Temperature 37.0 degrees Celsius    FiO2 50 %   Lactate Dehydrogenase   Result Value Ref Range     84 - 246 U/L   Hepatic function panel   Result Value Ref Range    Albumin 4.7 3.4 - 5.0 g/dL    Bilirubin, Total 0.5 0.0 - 1.2 mg/dL    Bilirubin, Direct 0.2 0.0 - 0.3 mg/dL    Alkaline Phosphatase 37 33 - 110 U/L    ALT 16 7 - 45 U/L    AST 24 9 - 39 U/L    Total Protein 6.3 (L) 6.4 - 8.2 g/dL   Magnesium   Result Value Ref Range    Magnesium 2.04 1.60 - 2.40 mg/dL   CBC   Result  Value Ref Range    WBC 7.9 4.4 - 11.3 x10*3/uL    nRBC 0.0 0.0 - 0.0 /100 WBCs    RBC 2.97 (L) 4.00 - 5.20 x10*6/uL    Hemoglobin 8.6 (L) 12.0 - 16.0 g/dL    Hematocrit 25.8 (L) 36.0 - 46.0 %    MCV 87 80 - 100 fL    MCH 29.0 26.0 - 34.0 pg    MCHC 33.3 32.0 - 36.0 g/dL    RDW 14.7 (H) 11.5 - 14.5 %    Platelets 81 (L) 150 - 450 x10*3/uL   Calcium, Ionized   Result Value Ref Range    POCT Calcium, Ionized 1.14 1.1 - 1.33 mmol/L   Basic Metabolic Panel   Result Value Ref Range    Glucose 173 (H) 74 - 99 mg/dL    Sodium 134 (L) 136 - 145 mmol/L    Potassium 4.1 3.5 - 5.3 mmol/L    Chloride 97 (L) 98 - 107 mmol/L    Bicarbonate 21 21 - 32 mmol/L    Anion Gap 20 10 - 20 mmol/L    Urea Nitrogen 88 (H) 6 - 23 mg/dL    Creatinine 3.50 (H) 0.50 - 1.05 mg/dL    eGFR 17 (L) >60 mL/min/1.73m*2    Calcium 9.0 8.6 - 10.6 mg/dL   Phosphorus   Result Value Ref Range    Phosphorus 3.5 2.5 - 4.9 mg/dL   Coagulation Screen   Result Value Ref Range    Protime 12.8 9.8 - 12.8 seconds    INR 1.1 0.9 - 1.1    aPTT 87 (H) 27 - 38 seconds   BLOOD GAS MIXED VENOUS FULL PANEL   Result Value Ref Range    POCT pH, Mixed 7.34 7.33 - 7.43 pH    POCT pCO2, Mixed 40 (L) 41 - 51 mm Hg    POCT pO2, Mixed 46 (H) 35 - 45 mm Hg    POCT SO2, Mixed 73 45 - 75 %    POCT Oxy Hemoglobin, Mixed 71.5 45.0 - 75.0 %    POCT Hematocrit Calculated, Mixed 26.0 (L) 36.0 - 46.0 %    POCT Sodium, Mixed 134 (L) 136 - 145 mmol/L    POCT Potassium, Mixed 4.3 3.5 - 5.3 mmol/L    POCT Chloride, Mixed 99 98 - 107 mmol/L    POCT Ionized Calcium, Mixed 1.20 1.10 - 1.33 mmol/L    POCT Glucose, Mixed 166 (H) 74 - 99 mg/dL    POCT Lactate, Mixed 0.8 0.4 - 2.0 mmol/L    POCT Base Excess, Mixed -3.9 (L) -2.0 - 3.0 mmol/L    POCT HCO3 Calculated, Mixed 21.6 (L) 22.0 - 26.0 mmol/L    POCT Hemoglobin, Mixed 8.8 (L) 12.0 - 16.0 g/dL    POCT Anion Gap, Mixed 18 10 - 25 mmo/L    Patient Temperature 37.0 degrees Celsius    FiO2 40 %   BLOOD GAS MIXED VENOUS FULL PANEL   Result Value Ref  Range    POCT pH, Mixed 7.37 7.33 - 7.43 pH    POCT pCO2, Mixed 39 (L) 41 - 51 mm Hg    POCT pO2, Mixed 48 (H) 35 - 45 mm Hg    POCT SO2, Mixed 81 (H) 45 - 75 %    POCT Oxy Hemoglobin, Mixed 80.0 (H) 45.0 - 75.0 %    POCT Hematocrit Calculated, Mixed 26.0 (L) 36.0 - 46.0 %    POCT Sodium, Mixed 133 (L) 136 - 145 mmol/L    POCT Potassium, Mixed 4.1 3.5 - 5.3 mmol/L    POCT Chloride, Mixed 100 98 - 107 mmol/L    POCT Ionized Calcium, Mixed 1.16 1.10 - 1.33 mmol/L    POCT Glucose, Mixed 115 (H) 74 - 99 mg/dL    POCT Lactate, Mixed 0.8 0.4 - 2.0 mmol/L    POCT Base Excess, Mixed -2.6 (L) -2.0 - 3.0 mmol/L    POCT HCO3 Calculated, Mixed 22.5 22.0 - 26.0 mmol/L    POCT Hemoglobin, Mixed 8.5 (L) 12.0 - 16.0 g/dL    POCT Anion Gap, Mixed 15 10 - 25 mmo/L    Patient Temperature 37.0 degrees Celsius    FiO2 40 %   Calcium, Ionized   Result Value Ref Range    POCT Calcium, Ionized 1.11 1.1 - 1.33 mmol/L   CBC   Result Value Ref Range    WBC 10.0 4.4 - 11.3 x10*3/uL    nRBC 0.0 0.0 - 0.0 /100 WBCs    RBC 2.54 (L) 4.00 - 5.20 x10*6/uL    Hemoglobin 7.4 (L) 12.0 - 16.0 g/dL    Hematocrit 21.4 (L) 36.0 - 46.0 %    MCV 84 80 - 100 fL    MCH 29.1 26.0 - 34.0 pg    MCHC 34.6 32.0 - 36.0 g/dL    RDW 14.6 (H) 11.5 - 14.5 %    Platelets 121 (L) 150 - 450 x10*3/uL   Renal function panel   Result Value Ref Range    Glucose 114 (H) 74 - 99 mg/dL    Sodium 136 136 - 145 mmol/L    Potassium 3.9 3.5 - 5.3 mmol/L    Chloride 99 98 - 107 mmol/L    Bicarbonate 22 21 - 32 mmol/L    Anion Gap 19 10 - 20 mmol/L    Urea Nitrogen 85 (H) 6 - 23 mg/dL    Creatinine 3.41 (H) 0.50 - 1.05 mg/dL    eGFR 18 (L) >60 mL/min/1.73m*2    Calcium 8.7 8.6 - 10.6 mg/dL    Phosphorus 3.4 2.5 - 4.9 mg/dL    Albumin 4.2 3.4 - 5.0 g/dL   Magnesium   Result Value Ref Range    Magnesium 1.94 1.60 - 2.40 mg/dL   Coagulation Screen   Result Value Ref Range    Protime 12.3 9.8 - 12.8 seconds    INR 1.1 0.9 - 1.1    aPTT 29 27 - 38 seconds   Lactate dehydrogenase   Result  Value Ref Range     (H) 84 - 246 U/L   Type and screen   Result Value Ref Range    ABO TYPE O     Rh TYPE POS     ANTIBODY SCREEN NEG    Tacrolimus level   Result Value Ref Range    Tacrolimus  6.9 <=15.0 ng/mL   BLOOD GAS MIXED VENOUS FULL PANEL   Result Value Ref Range    POCT pH, Mixed 7.38 7.33 - 7.43 pH    POCT pCO2, Mixed 37 (L) 41 - 51 mm Hg    POCT pO2, Mixed 48 (H) 35 - 45 mm Hg    POCT SO2, Mixed 81 (H) 45 - 75 %    POCT Oxy Hemoglobin, Mixed 78.6 (H) 45.0 - 75.0 %    POCT Hematocrit Calculated, Mixed 27.0 (L) 36.0 - 46.0 %    POCT Sodium, Mixed 133 (L) 136 - 145 mmol/L    POCT Potassium, Mixed 3.9 3.5 - 5.3 mmol/L    POCT Chloride, Mixed 99 98 - 107 mmol/L    POCT Ionized Calcium, Mixed 1.16 1.10 - 1.33 mmol/L    POCT Glucose, Mixed 179 (H) 74 - 99 mg/dL    POCT Lactate, Mixed 0.5 0.4 - 2.0 mmol/L    POCT Base Excess, Mixed -2.9 (L) -2.0 - 3.0 mmol/L    POCT HCO3 Calculated, Mixed 21.9 (L) 22.0 - 26.0 mmol/L    POCT Hemoglobin, Mixed 8.9 (L) 12.0 - 16.0 g/dL    POCT Anion Gap, Mixed 16 10 - 25 mmo/L    Patient Temperature 37.0 degrees Celsius    FiO2 40 %   CBC   Result Value Ref Range    WBC 15.5 (H) 4.4 - 11.3 x10*3/uL    nRBC 0.0 0.0 - 0.0 /100 WBCs    RBC 2.90 (L) 4.00 - 5.20 x10*6/uL    Hemoglobin 8.5 (L) 12.0 - 16.0 g/dL    Hematocrit 23.7 (L) 36.0 - 46.0 %    MCV 82 80 - 100 fL    MCH 29.3 26.0 - 34.0 pg    MCHC 35.9 32.0 - 36.0 g/dL    RDW 14.1 11.5 - 14.5 %    Platelets 119 (L) 150 - 450 x10*3/uL   Blood Gas Arterial Full Panel   Result Value Ref Range    POCT pH, Arterial 7.43 (H) 7.38 - 7.42 pH    POCT pCO2, Arterial 32 (L) 38 - 42 mm Hg    POCT pO2, Arterial 167 (H) 85 - 95 mm Hg    POCT SO2, Arterial 99 94 - 100 %    POCT Oxy Hemoglobin, Arterial 97.6 94.0 - 98.0 %    POCT Hematocrit Calculated, Arterial 27.0 (L) 36.0 - 46.0 %    POCT Sodium, Arterial 133 (L) 136 - 145 mmol/L    POCT Potassium, Arterial 3.9 3.5 - 5.3 mmol/L    POCT Chloride, Arterial 100 98 - 107 mmol/L    POCT Ionized  Calcium, Arterial 1.18 1.10 - 1.33 mmol/L    POCT Glucose, Arterial 169 (H) 74 - 99 mg/dL    POCT Lactate, Arterial 0.5 0.4 - 2.0 mmol/L    POCT Base Excess, Arterial -2.6 (L) -2.0 - 3.0 mmol/L    POCT HCO3 Calculated, Arterial 21.2 (L) 22.0 - 26.0 mmol/L    POCT Hemoglobin, Arterial 9.1 (L) 12.0 - 16.0 g/dL    POCT Anion Gap, Arterial 16 10 - 25 mmo/L    Patient Temperature 37.0 degrees Celsius    FiO2 40 %    Peep CHM2O 10.0 cm H2O   Blood Gas Arterial Full Panel   Result Value Ref Range    POCT pH, Arterial 7.41 7.38 - 7.42 pH    POCT pCO2, Arterial 34 (L) 38 - 42 mm Hg    POCT pO2, Arterial 154 (H) 85 - 95 mm Hg    POCT SO2, Arterial 99 94 - 100 %    POCT Oxy Hemoglobin, Arterial 97.9 94.0 - 98.0 %    POCT Hematocrit Calculated, Arterial 28.0 (L) 36.0 - 46.0 %    POCT Sodium, Arterial 131 (L) 136 - 145 mmol/L    POCT Potassium, Arterial 4.0 3.5 - 5.3 mmol/L    POCT Chloride, Arterial 98 98 - 107 mmol/L    POCT Ionized Calcium, Arterial 1.17 1.10 - 1.33 mmol/L    POCT Glucose, Arterial 172 (H) 74 - 99 mg/dL    POCT Lactate, Arterial 0.5 0.4 - 2.0 mmol/L    POCT Base Excess, Arterial -2.6 (L) -2.0 - 3.0 mmol/L    POCT HCO3 Calculated, Arterial 21.6 (L) 22.0 - 26.0 mmol/L    POCT Hemoglobin, Arterial 9.2 (L) 12.0 - 16.0 g/dL    POCT Anion Gap, Arterial 15 10 - 25 mmo/L    Patient Temperature 37.0 degrees Celsius    FiO2 40 %     *Note: Due to a large number of results and/or encounters for the requested time period, some results have not been displayed. A complete set of results can be found in Results Review.         Assessment/Plan   Assessment: Ms. Yimi Bowles is a 33F with a PMHx sig for stage D HFrEF (PPCM) s/p ICD s/p CardioMEMs, hypothyroidism 2/2 thyroidectomy, obesity s/p gastric bypass (2017), and SLE who is s/p OHT (3/31/2022) with a post-OHT course complicated by RIJ/RUE DVTs, leukopenia, left groin seroma, and asymptomatic 2R ACR (11/2022) treated with steroids, s/p total hysterectomy (2023), Flu A  (1/2024).    Originally presented to [facility] ED on 2/15/24 with complaints of N/V x 3 days and inability to keep medications down. POCUS revealed acute systolic heart failure w/ severe BIV dysfunction when compared to previous images in 11/2023. Also noted to have HIRAM requiring CVVH and transaminitis. Immunosuppression notably below therapeutic range. Admitted to HFICU and treated for suspected acute cellular vs. acute antibody mediated rejection; however, cardiac biopsy negative for signs of rejection. Despite rejection therapy, inotropes and MCS via IABP (2/18/24), the patient's end organ function continued to worsen without improvement in cardiac function. Ultimately placed on left femoral VA ECMO w/ Dr. Marina on 2/19/2024 and transferred to CTICU.     CTICU course complicated by anemia requiring blood product transfusions, epistaxis, volume overload & intubation (tachypnea and increased work of breathing 2/2 pulmonary edema). Status post extubation on 2/24/24. Now showing renal recovery off CVVH and cardiac allograft recovery s/p ECMO decannulation on 2/29/24 w/ Dr. Witt.       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)  Toxo: -/-   Hep C: -/-    Rejection/Prophylaxis (transplants):  - Steroids: Continue 10mg PO prednisone daily  - Tacrolimus: 4.0mg PO @ 0630 & 4.0mg PO @ 1830 w/ daily levels drawn @ 0600  - Serolimus: 2.5mg PO daily w/ daily levels drawn at 0600  - Tacrolimus goal troughs: 3-5  - Serolimus goal troughs: 7-9  - Combined sirolimus/tacrolimus goal of 10-12  - Antifungals: nystatin 500,000units Q6  - Antivirals: restart valcyte 450mg Q48hrs due to low grade viremia   -  Anti PCP & Toxoplasmosis: Bactrim SS daily  --> Holding due to thrombocytopenia (2/23)    Last cardiac biopsy: 2/16/24 with ACR1 and no AMR  Last HLA: 2/16/24 negative for DSAs   Last RHC: 2/16/24 w/ RA 11, PAP 34/14(23), W 19 and C.I. 2.3  Last LHC: 2/18/24 negative for CAV and CAD   Last TTE/BOB: 3/1/24 shows LVEF to 30% and mild RV dysfunction (intra-op impella 5.5 insert)  Osteopenia/osteoporosis prophylaxis: Vitamin D3 and calcium supplements  Peptic/gastric ulcer prophylaxis: Pantoprazole 40mg daily   CAV Prophylaxis: Aspirin 81mg daily & pravastatin daily    - Unclear cause of acute severe graft dysfunction. Most likely smoldering ACR vs. AMR; however, 2xallograft biopsies on 2/16 & 2/20 remain negative for any significant pathology. Notably, both biopsies taken after rejection therapies implemented which may have reduced areas of graft damage.    - DSAs remain negative; however, patient may have non-HLA antibodies present. 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   - Negative CAV & CAD via LHC on 2/18/24   - Completed methylpred steroid pulse w/ 1g Q24 x 3 days (2/16-2/18) and prednisone taper   - Thymoglobulin doses: 2/18 & 2/19   - IVIG + PLEX Session: 2/18 & 2/20   - Extubated on 2/24/24 w/ chest x-ray showing minimal pulmonary edema   - CVVH stopped on 2/27/24. HIRAM previously improving and autodiuresing; however, since ECMO decannulation, UOP has decreased and creatinine uptrending   - Graft function slightly worse after transition to impella 5.5. IABP removed 3/1/24.  - On Milrinone 0.375mcg/kg/min w/ borderline C.I. ~2.5, CVP improved to single digits, PA pressures also improved with impella.      Recommendations:  - Continue milrinone to 0.375mcg/kg/min for RV support   - Repeat echo to evaluate BIV function   - Continue valcyte 450mg Q48hrs and continue to check CMVPCRs weekly (next due on 3/8/24)   - No changes to immunosuppression due to worsening renal status   - Agree with holding SQH  due to ongoing transfusion requirements. Okay to hold aspirin as well.    - Plan for biopsy on 3/6/24 (will need case request placed next week)     Appreciate continued CTICU care/management.    Patient was examined and discussed with HF attending, Dr. UMA Padilla & CTICU team     Jason Burns DO  Advanced Heart Failure & Transplant Fellow

## 2024-03-02 NOTE — BRIEF OP NOTE
3Date: 2/15/2024 - 3/1/2024  OR Location: [facility] OR    Name: Charla Bowles, : 1990, Age: 33 y.o., MRN: [id number], Sex: female    Diagnosis  Pre-op Diagnosis     * Cardiogenic shock (CMS/HCC) [R57.0] Post-op Diagnosis     * Cardiogenic shock (CMS/HCC) [R57.0]     Procedures  Impella VAD Insertion  39120 - AR INSJ PERQ VAD W/RS&I L HRT ARTERIAL ACCESS ONLY  Impella 5.5 Insertion in right axillary artery via 10mm Gelweave graft   Removal of IABP from right femoral artery     Surgeons      * Danny Viramontes - Primary    Resident/Fellow/Other Assistant:  Surgeon(s) and Role:  Kiet Frank Pesek   Procedure Summary  Anesthesia: General  ASA: IV  Anesthesia Staff: Anesthesiologist: Arian Ha MD  Anesthesia Resident: Yasmani Gaona MD  Estimated Blood Loss: 100mL  Intra-op Medications:   Administrations occurring from 1800 to 2230 on 24:   Medication Name Total Dose   sodium bicarbonate 1 mEq/mL (8.4 %) injection 25 mEq   acetaminophen (Tylenol) tablet 975 mg Cannot be calculated   aspirin EC tablet 81 mg Cannot be calculated   calcitriol (Rocaltrol) solution 0.5 mcg Cannot be calculated   calcium gluconate in NS IV 1 g Cannot be calculated   calcium gluconate in NS IV 2 g Cannot be calculated   dextrose 50 % injection 25 g Cannot be calculated   ferrous sulfate (325 mg ferrous sulfate) tablet 1 tablet Cannot be calculated   glucagon (Glucagen) injection 1 mg Cannot be calculated   hydrALAZINE (Apresoline) injection 10 mg Cannot be calculated   hydrALAZINE (Apresoline) tablet 25 mg Cannot be calculated   insulin lispro (HumaLOG) injection 0-10 Units Cannot be calculated   levothyroxine (Synthroid, Levoxyl) tablet 200 mcg Cannot be calculated   lidocaine 4 % patch 1 patch Cannot be calculated   lubricating eye drops ophthalmic solution 2 drop Cannot be calculated   magnesium sulfate in D5W IV 1 g Cannot be calculated   magnesium sulfate IV 2 g Cannot be calculated   melatonin  tablet 10 mg Cannot be calculated   milrinone (Primacor) 20 mg in dextrose 5% 100 mL (0.2 mg/mL) infusion (premix) 8.63 mg   multivitamin with minerals 1 tablet Cannot be calculated   mupirocin (Bactroban) 2 % ointment Cannot be calculated   nystatin (Mycostatin) 100,000 unit/mL suspension 500,000 Units Cannot be calculated   oxyCODONE (Roxicodone) immediate release tablet 10 mg Cannot be calculated   oxyCODONE (Roxicodone) immediate release tablet 5 mg Cannot be calculated   oxymetazoline (Afrin) 0.05 % nasal spray 2 spray Cannot be calculated   pantoprazole (ProtoNix) EC tablet 40 mg Cannot be calculated   perflutren lipid microspheres (Definity) injection 0.5-10 mL of dilution Cannot be calculated   polyethylene glycol (Glycolax, Miralax) packet 17 g Cannot be calculated   potassium chloride 40 mEq in 100 mL IV premix Cannot be calculated   predniSONE (Deltasone) tablet 10 mg Cannot be calculated   rosuvastatin (Crestor) tablet 40 mg Cannot be calculated   sennosides-docusate sodium (Patti-Colace) 8.6-50 mg per tablet 1 tablet Cannot be calculated   sirolimus (Rapamune) tablet 2.5 mg Cannot be calculated   sodium chloride (Ocean) 0.65 % nasal spray 5 spray Cannot be calculated   tacrolimus (Prograf) capsule 4 mg Cannot be calculated   traZODone (Desyrel) tablet 50 mg Cannot be calculated   valGANciclovir (Valcyte) tablet 450 mg Cannot be calculated              Anesthesia Record               Intraprocedure I/O Totals          Intake    Transfuse .00 mL    LR bolus 500.00 mL    Norepinephrine Drip 0.00 mL    The total shown is the total volume documented since Anesthesia Start was filed.    Epinephrine Drip 0.00 mL    The total shown is the total volume documented since Anesthesia Start was filed.    Milrinone Drip 0.00 mL    The total shown is the total volume documented since Anesthesia Start was filed.    Total Intake 850 mL          Specimen: No specimens collected     Staff:   Circulator: Kristen RAYO  Yulia, RN  Scrub Person: Bianca Pedersen          Findings: Cardiogenic Shock     Complications:  None; patient tolerated the procedure well.     Disposition: ICU - intubated and hemodynamically stable.  Condition: stable  Specimens Collected: No specimens collected  Attending Attestation: I was present and scrubbed for the key portions of the procedure.+    Danny Viramontes  Phone Number: [phone]

## 2024-03-02 NOTE — PROGRESS NOTES
Charla Bowles is a 33 y.o. female on day 16 of admission presenting with Cardiogenic shock (CMS/HCC).    Subjective   Patient discussed in morning handover, patient examined and chart reviewed. Meds, labs and radiology reviewed during full interdisciplinary rounds this morning. Discussed HF team this morning and reviewed with the HF surgeon this afternoon.    HPI: Patient with a previous history of heart transplant in March of 2022, admitted for cardiogenic shock with concerns for acute cellular rejection and associated multiorgan dysfunction including significant elevation of liver enzymes and nonoliguric acute kidney injury.     Patient presented to [facility] ED on 2/15/24 with complaints of N/V x 3 days and inability to keep medications down. POCUS revealed acute systolic heart failure w/ severe BIV dysfunction when compared to previous images in 11/2023. Also noted to have HIRAM requiring CVVH and transaminitis. Immunosuppression notably below therapeutic range. Admitted to HFICU and treated for suspected acute cellular vs. acute antibody mediated rejection (Endomyocardial biopsy on 2/16 revealed mild ACR with +CD4s and negative HLAs). Despite rejection therapy, inotropes and MCS via IABP (2/18/24), the patient's end organ function continued to worsen without improvement in cardiac function. Ultimately placed on left femoral VA ECMO w/ Dr. Marina on 2/19/2024 and transferred to CTICU.     Course in Hospital:   2/17 - received PLEX and IVIG for presumed rejection  2/18 - s/p IABP placement (R fem); also received thymoglobulin for presumed rejection  2/19 - escalation to VA ECMO (left fem); additional dose of thymo  2/20 -  PLEX/IVIG; TTE - persisting severe BiV dysfunction despite negative biopsy (ACR 0R, pAMR0 and no C3D or C4D)  2/22 - Echo with turn-down; EF 10%, severely reduced RV, mild AR and mild-moderate MR  2/24 - Extubated  2/27 - Repeat turndown (0.8L); LVEF improved to 30% and moderate RV; CVVH  stopped and patient responding to IV diuretics   2/28 - TTE with EF 30% and reduced RV (on 0L flow)  2/29 - ECMO decannulation; Post-decannulation EF reported 44% with IABP 1: 1 and milrinone 125   1/3 - Worsening clinical status and oliguria; decision made to take back to the OR for R axillary Impella insertion (5.5) and removal of IABP.    PMH:  stage D HFrEF (PPCM) s/p ICD s/p CardioMEMs,    and SLE who is s/p OHT (3/31/2022) with a post-OHT course complicated by RIJ/RUE DVTs, leukopenia, left groin seroma, and asymptomatic 2R ACR (11/2022) treated with steroids, s/p total hysterectomy (2023), Flu A (1/2024).   Remote DVT   Hypothyroidism 2/2 thyroidectomy  Obesity s/p gastric bypass (2017)  MMF colitis    Overnight: Patient returned from OR and R femoral sheath removed; epinephrine weaned off.     Objective     Physical Exam  Cardiovascular:      Rate and Rhythm: Regular rhythm. Tachycardia present.      Comments: PAP: (20-47)/(11-29) 26/17  CVP:  [0 mmHg-336 mmHg] 3 mmHg  CO:  [3.7 L/min-5.62 L/min] 4.9 L/min  CI:  [1.9 L/min/m2-2.85 L/min/m2] 2.5 L/min/m2    Short runs of VT (4-5 beats) with waking up off sedation this morning.    Milrinone: 0.375  SvO2 of 80%    Impella site does not appear to be bleeding    Lines:   Right radial arterial line placed 2/16  RIJ introducer with PAC placed 2/16  LIF Trialysis placed 2/17   Pulmonary:      Comments: Vent Mode: Pressure support  FiO2 (%):  [40 %-50 %] 40 %  S RR:  [16] 16  S VT:  [380 mL] 380 mL  PEEP/CPAP (cm H2O):  [5 cm H20-8 cm H20] 8 cm H20  AR SUP:  [8 cm H20-10 cm H20] 8 cm H20  MAP (cm H2O):  [11-12] 12  Genitourinary:     Comments: Singh in situ:  Neurological:      Mental Status: She is alert.      Comments: On low dose propofol and dexmedetomidine   Psychiatric:         Behavior: Behavior is cooperative.       Impella Interogation:      Most Recent Range Past 24hrs   Performance Level 8 P Level  Min: 8   Min taken time: 03/02/24 0900  Max: 8   Max taken  "time: 03/02/24 0900   Flow (L/min) 4.4 Flow (L/min)  Min: 4.1   Min taken time: 03/01/24 2200  Max: 4.8   Max taken time: 03/02/24 0200   Motor Current 488/354 Motor Current  Min: 478/385   Min taken time: 03/02/24 0700  Max: 488/354   Max taken time: 03/02/24 0900   Placement Signal Yes  Placement OK could not be evaluated. This SmartLink does not work with rows of the type: Custom List   Purge (mmHg) 484 Purge Pressure (mmHg)  Min: 42   Min taken time: 03/02/24 0800  Max: 505   Max taken time: 03/01/24 2200   Purge rate (mL/hr) 11.5 Purge Rate (mL/hr)  Min: 11   Min taken time: 03/02/24 0000  Max: 12   Max taken time: 03/02/24 0800   No suction alarms overnight. LDH pending from this morning    Last Recorded Vitals  Blood pressure 102/82, pulse (!) 131, temperature 37.4 °C (99.3 °F), temperature source Core, resp. rate 10, height 1.549 m (5' 0.98\"), weight 91.3 kg (201 lb 4.5 oz), SpO2 100 %.  Intake/Output last 3 Shifts:  I/O last 3 completed shifts:  In: 3952.7 (43.3 mL/kg) [P.O.:640; I.V.:1240.7 (13.6 mL/kg); Blood:1072; IV Piggyback:1000]  Out: 1730 (18.9 mL/kg) [Urine:1110 (0.3 mL/kg/hr); Drains:20; Blood:600]  Weight: 91.3 kg      Assessment/Plan     ICU Liberation Bundle:  A-Analgesia: Tylenol and opioids at lowest effective dose  B-SBT: pending  C-RASS Target: 0 - -1  D-CAM: Negative this morning on low dose sedation  E-Mobilization Plan: Stage I-III (once extubated)  F - Family Last Update: Mother on rounds this morning    Daily Checklist:  HOB > 30 degrees: achieved  Feeding: NPO for possible extubation  Thromboprophylaxis: Heparin and SCDs  Ulcer Prophylaxis: PPI  Glucose Control (Target ): < 180 achieved; no hypoglycemia  Line to removed: None at present  Bowel Care: Bowel regime ordered  De-escalation of Antibiotics: None at present    Plan:  Acute respiratory insufficiency - Wean to extubate  Low cardiac output - Impella increased to P9 this afternoon  HIRAM resolved- target neutral to -500cc for " 24 hours  Continue with thyroid meds  Mobilize and start ICU rehab; HF will be discussing her case next for possible re-listing for transplant.    I spent 63 minutes in the professional and overall care of this patient.    This critically ill patient continues to be at-risk for clinically significant deterioration / failure due to the above mentioned dysfunctional, unstable organ systems.  I have personally identified and managed all complex critical care issues to prevent aforementioned clinical deterioration.  Critical care time is spent at bedside and/or the immediate area and has included, but is not limited to, the review of diagnostic tests, labs, radiographs, serial assessments of hemodynamics, respiratory status, ventilatory management, and family updates.  Time spent in procedures and teaching are reported separately.    Eduardo Banks MD

## 2024-03-02 NOTE — PROGRESS NOTES
Physical Therapy                 Therapy Communication Note    Patient Name: Charla Bowles  MRN: [id number]  Today's Date: 3/2/2024     Discipline: Physical Therapy    Missed Visit Reason: Missed Visit Reason: Other (Comment) (Pt intubated, sedated, and on impella. Will hold until medically appropriate.)    Missed Time: Attempt    Comment:

## 2024-03-02 NOTE — ANESTHESIA PROCEDURE NOTES
Arterial Line:    Date/Time: 3/1/2024 6:59 PM    Staffing  Performed: resident   Authorized by: Vikram Scott MD    Performed by: Yasmani Gaona MD    An arterial line was placed. Procedure performed using ultrasound guidance.in the OR for the following indication(s): continuous blood pressure monitoring, blood sampling needed and unable to use non-invasive cuff.    A 20 gauge (size), 15 cm (length), Arrow (type) catheter was placed into the Left radial artery, secured by Tegaderm,   Seldinger technique used.  Events:  patient tolerated procedure well with no complications.

## 2024-03-02 NOTE — SIGNIFICANT EVENT
R Femoral Sheath Removal     Patient returned from OR with R femoral sheath intact in RLE. IABP was removed in OR. Plan was to transduce sheath however catheter had cap on that was unable to be attached to transducer. Decision made to remove R femoral sheath in order to avoid arterial clotting.     Site prepped with chloraprep and sutures removed.  Sheath suspended and  removed.  Firm pressure applied for 30 min.  No hematoma noted; palpable femoral, DP, and PT pulses.  Bedrest x 6 hours.  RN notified.      Maryjane Ponce MD  PGY-3/ CA-2  Dept. of Anesthesiology and Perioperative Medicine

## 2024-03-02 NOTE — SIGNIFICANT EVENT
Return from OR s/p Impella 5.5 insertion in R axillary artery and removal of IABP.    OR Course:   EBL: 600cc  UOP: 175cc  Crystalloid: 700cc LR   Colloid: none  Cellsaver: none  Products: 1upRBC  Antibiotic time: none  Intubation: difficult airway- grade 2a view with 7.0ETT and Mac 3; friable mucosa noted  New Lines/Access: L brachial a line   Intra-op: R ax site noted to be oozy. Pressure dressing applied       Neuro: Intubated and sedated on propofol. Will wean sedation.   Cardiac: s/p VA ECMO, decaanulation 2/29. S/p impella placement and IABP removal. Impella setting P8, flowing 4.1 L/min. R femoral sheath in place- plan to transduce vs removal. On epi 0.01 and milrinone 0.375. Follow up CI/SVR and mixed venous. Hydral 100mg IV q4h prn for MAP>90.  Pulmonary: f/up post op CXR. Plan to CPAP and SAT   Gastrointestinal: Ppi and BR  Renal: Singh out. Will follow up post op ABG. Replete electrolytes per CTICU protocol  Endocrine: SSI 2, levothyroxine for hypothyroidism  Hematology: Received 1upRBC intra-op. Noted to be oozy at R ax site. F/up TEG. Hgb 8.6, plt 81 post op. Held SQH  Infectious Disease: No active antimicrobials. Holding prophy  Immuno: Sirolimus/tacrolimus per levels 2.5 mg daily, 4 mg BID. Pred 10  Musculoskeletal: Left groin site with small hematoma     Lines/Tubes/Drains: PAC, MAC, a line           Disposition: CTICU

## 2024-03-02 NOTE — PROGRESS NOTES
CTICU Progress Note  Charla Bowles/[id number]    Admit Date: 2/15/2024  Hospital Length of Stay: 16   ICU Length of Stay: 11d 15h   CT SURGEON:     SUBJECTIVE:   Impella 5.5 placement yesterday evening  IABP sheath pulled  1 pRBC  reversed    MEDICATIONS  Infusions:  dexmedeTOMIDine, Last Rate: 0.2 mcg/kg/hr (03/02/24 0700)  lactated Ringer's, Last Rate: 5 mL/hr (03/02/24 0700)  lactated Ringer's, Last Rate: 10 mL/hr (03/02/24 0700)  milrinone, Last Rate: 0.375 mcg/kg/min (03/02/24 0700)  propofol, Last Rate: 35 mcg/kg/min (03/02/24 0700)  sodium bicarbonate infusion Impella Purge 25 mEq/1000 mL D5W      Scheduled:  acetaminophen, 975 mg, q6h  aspirin, 81 mg, Daily  calcitriol, 0.5 mcg, Daily  ferrous sulfate (325 mg ferrous sulfate), 65 mg of iron, Daily with breakfast  glycopyrrolate, 0.8 mg, Once  glycopyrrolate, ,   [Held by provider] heparin (porcine), 5,000 Units, q8h  insulin lispro, 0-10 Units, TID with meals  levothyroxine, 200 mcg, Daily  lidocaine, 1 patch, Daily  melatonin, 10 mg, Nightly  multivitamin with minerals, 1 tablet, Daily  neostigmine, ,   neostigmine, 5 mg, Once  nystatin, 5 mL, q6h  pantoprazole, 40 mg, Daily before breakfast  polyethylene glycol, 17 g, BID  predniSONE, 10 mg, Daily  rosuvastatin, 40 mg, Nightly  sennosides-docusate sodium, 1 tablet, Daily  sirolimus, 2.5 mg, Daily  sodium chloride, 5 spray, 4x daily  [Held by provider] sulfamethoxazole-trimethoprim, 80 mg of trimethoprim, Daily  tacrolimus, 4 mg, q12h TRICIA  [Held by provider] valGANciclovir, 450 mg, q48h  valGANciclovir, 450 mg, q48h      PRN:  calcium gluconate, 1 g, q6h PRN  calcium gluconate, 2 g, q6h PRN  dextrose, 25 g, q15 min PRN  dextrose, 25 g, q15 min PRN   Or  glucagon, 1 mg, q15 min PRN  glucagon, 1 mg, q15 min PRN  glycopyrrolate, ,   hydrALAZINE, 10 mg, q4h PRN  HYDROmorphone, 0.2 mg, q15 min PRN  artificial tears, 2 drop, PRN  magnesium sulfate, 1 g, q6h PRN  magnesium sulfate, 2 g, q6h PRN  mupirocin, ,  "BID PRN  neostigmine, ,   oxyCODONE, 10 mg, q4h PRN  oxyCODONE, 5 mg, q4h PRN  oxygen, , Continuous PRN - O2/gases  oxygen, , Continuous PRN - O2/gases  potassium chloride, 40 mEq, q6h PRN  traZODone, 50 mg, Nightly PRN        PHYSICAL EXAM:   Visit Vitals  /82   Pulse (!) 133   Temp 37.2 °C (99 °F) (Core)   Resp 17   Ht 1.549 m (5' 0.98\")   Wt 91.3 kg (201 lb 4.5 oz)   SpO2 100%   BMI 38.05 kg/m²   OB Status Hysterectomy   Smoking Status Never   BSA 1.98 m²     Wt Readings from Last 5 Encounters:   02/27/24 91.3 kg (201 lb 4.5 oz)   12/07/23 92.1 kg (203 lb)   12/01/23 93 kg (205 lb)   11/29/23 92.9 kg (204 lb 12.8 oz)   11/09/23 91.3 kg (201 lb 3.2 oz)     INTAKE/OUTPUT:  I/O last 3 completed shifts:  In: 3952.7 (43.3 mL/kg) [P.O.:640; I.V.:1240.7 (13.6 mL/kg); Blood:1072; IV Piggyback:1000]  Out: 1730 (18.9 mL/kg) [Urine:1110 (0.3 mL/kg/hr); Drains:20; Blood:600]  Weight: 91.3 kg          Vent settings:  Vent Mode: Volume control/assist control  FiO2 (%):  [40 %-50 %] 40 %  S RR:  [16] 16  S VT:  [380 mL] 380 mL  PEEP/CPAP (cm H2O):  [8 cm H20] 8 cm H20  MAP (cm H2O):  [11-12] 12    Physical Exam:   - CONSTITUTION: Young appearing female intubated with impella 5.5  - NEUROLOGIC: somnolent but following all commands  - CARDIOVASCULAR: R axillary impella, no bleeding at site.  P8, 4.5L flow. Sinus tachycardia.  - RESPIRATORY: Intubated, clear.  Tachypneic at times  - GI: soft, obese. Hypoactive BS  - : Singh with clear yellow urine  - EXTREMITIES: R fem site from IABP without hematoma or bleeding.  Palpable pulses throughout  - SKIN: intact  - PSYCHIATRIC: calm    Daily Risk Screen  Intubated: plan to extubate  Central line: dc [facility].  Alex PA cath. Continue RIJ for parenteral meds  Singh: possible dc     Images:     Invasive Hemodynamics:    Most Recent Range Past 24hrs   BP (Art) 107/85 Arterial Line BP 1  Min: 74/70  Max: 144/100   MAP(Art) 91 mmHg Arterial Line MAP 1 (mmHg)   Min: 69 mmHg  Max: 118 mmHg "   RA/CVP   No data recorded   PA 28/19 PAP  Min: 20/13  Max: 47/28   PA(mean) 23 mmHg PAP (Mean)  Min: -10 mmHg  Max: 38 mmHg   CO 4.9 L/min CO (L/min)  Min: 3.7 L/min  Max: 5.62 L/min   CI 2.5 L/min/m2 CI (L/min/m2)  Min: 1.9 L/min/m2  Max: 2.85 L/min/m2   Mixed Venous 63 % No data recorded   SVR  1217 (dyne*sec)/cm5 SVR (dyne*sec)/cm5  Min: 1038 (dyne*sec)/cm5  Max: 1731 (dyne*sec)/cm5         Assessment/Plan   33 year old female with past medical history of heart transplant in March 2022 with postoperative course complicated by upper extremity/internal jugular DVTs, and 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 She was admitted to HFICU on 2/15 with concern for cardiogenic shock secondary to allograft rejection and decompensated heart failure with multiorgan dysfunction including significant elevation of liver enzymes and nonoliguric acute kidney injury. Endomyocardial biopsy on 2/16 revealed mild ACR with +CD4s and negative HLAs, however multisystem organ failure persisted with increased inotropic requirements. IABP placed on 2/18. Transferred to CTICU on 2/19 for [facility] ECMO cannulation with Dr. Marina for persistent multi-organ system dysfunction. Intubated 2/21 for respiratory failure 2/2 pulmonary edema, extubated 2/24. ECMO de-cannulated 2/29/24 but had worsening HIRAM and overall declined clinical status and IABP was transitioned to R axillary impella 5.5 3/1.      Plan:  NEURO: No history. Neuro intact. Currently sedated on propofol and precedex but following commands. Previous insomnia improve.d -->  - Serial neuro and pain assessments  - Continue scheduled Tylenol 975 mg q6  - Continue oxy 5-10 mg q4 PRN for mod-severe pain with prn dilaudid.  Will deescalate as able post extubation  - PT/OT Consult  - Continue scheduled melatonin 10 mg nightly and Trazodone 50 mg PRN for insomnia  - CAM ICU score qshift. Sleep/wake cycle hygiene     ENT: Pt with signficant epistaxisis overnight 2/28 requiring ENT consult ~ R  anterior nare packed with surgicel with resolution. Completed afrin x3 days with no further evidence of bleeding.  - [place] spray 5x day x10 days (completes 3/9)  - prn ocean spray and mupiricin for dry nasal passages     CV: Patient has a history of heart transplant in March of 2022 with TTE on 11/29 with LVEF 60-65%. Admitted for cardiogenic shock with concern for acute cellular rejection s/p IABP placement (R fem) 2/18 and [facility] ECMO (left fem) 2/19. S/p ECMO decannulation 2/29 with subsequent decline now s/p impella 5.5 and IABP removal 3/1. ECHO 3/1 with EF 30-35% with persisting RV dysfunction.  Currently on milrinone 0.375 mcg/kg/min with impella at P8/4.5L flow with CI >3 and SVO2 80 and normal lactate. Normal filling pressures and trending hypertensive. Tachycardia persisting, unchanged despite repeated changes in therapies.  -->  - Maintain goal MAP 70-90  - Continue full impella support  - continue milrinone, may wean to 0.25 when afterload optimized  - Start PO hydral 25 mg q8 and continue PRN hydral 10 mg for MAP >90  - Continue home rosuvastatin 40mg daily  - Continue ASA 81 mg daily  - Hold home amlodipine     PULM: No history. Acute respiratory failure now resolved, intubated 2/21-2/24. Had been on RA. Reintubated for OR 3/1. Currently with appropriate oxygenation.  Poor Vt on pressure support.  -->  - Daily CXR  - Maintain SPO2 > 92%  - continue pressure support trials.  Plan to WTE if able  - checking cuff leak prior to extubating     GI: History of gastric bypass and MMF colitis.  Shock liver resolved.  Constipated with stool burden on KUB this AM.  Previously on oral diet. -->  - Continue home PPI, calcitriol 0.5mg daily, multivitamin, calcium carbonate 1,250mg BID  - holding diet  - reassess swallow after extubation  - avoid dobhoff with previous GI bleeding  - Continue miralax BID & senna-colace BID.  Giving milk of mag and as needed suppository     : No history, baseline Cr 1.2-1.3.  HIRAM  previously requiring CVVH, likely in setting of cardiorenal physiology. Renal US from 2/19 unremarkable.  Remains off CVVH and making urine. HIRAM uptrended off ECMO but now improving again. Euvolemic on exam. -->  - RFP as clinically indicated, replete electrolytes per CTICU protocol  - Holding off on diuresis unless trending significantly positive or filling pressures begin to show FVO  - will dc [place] once mobilizing     ENDO: History of hypothyroidism TSH 12.02, free thyroxine 2.19 (Endo previously consulted but now signed off). A1c: 6.3. Acceptable glycemic control on SSI. -->  - Maintain BG <180 with hypoglycemia protocol  - continue SSI  - Continue Synthroid 200mcg daily  - Follow up with primary Endo as outpatient for thyroid monitoring, per inpatient Endo recs      HEME: History of remote DVT. PF4 2/21 negative. Acute on chronic iron deficiency anemia. Acute blood loss anemia with repeated transfusions improved off systemic AC, received 1 pRBC overnight.  Stable thrombocytopenia after 5pk plts. Bleeding at impella site resolved. -->    - CBC as clinically indicated  - Continue daily ferrous sulfate  - SQH only per discussion with Dr. Viramontes  - bicarb purge for impella  - SCDs and for DVT prophylaxis  - Last type and screen: 3/2     Rejection/prophylaxis: S/p heart transplant 3/31/2022. Induction basiliximab. Donor HCV -, toxo -/-, CMV -/+. Mild ACR with +CD4 and negative HLAs however clinical presentation concern for AMR rejection.  PLEX/IVIG 2/17, 2/20. Thymo 2/18, 2/19. Repeat biopsy 2/20 with no evidence of on going rejection (ACR 0R, pAMR0 and no C3D or C4D).  - Continue prednisone 10mg daily  - Continue tacrolimus 4 mg BID with goal level 3-5  - Continue sirolimus 2.5 mg daily with goal level 7-9  - Plan for no further PLEX/IVIG or thymo  - Continue Nystatin suspension 500,000 units q6hr  - Hold bactrim in setting of thrombocytopenia per Transplant  - resume valcyte 450mg q48hrs per transplant team for  viremia  - Biopsy planned for 3/6 - will need to be NPO at 0000     ID: Received Zosyn and Vancomycin on 2/15 in ED. Blood cultures from 2/15 negative. No leukocytosis and afebrile. -->  - Trend temp q4h     Skin: No active skin issues.   - Preventative Mepilex dressings in place on sacrum and heels  - Change preventative Mepilex weekly or more frequently as indicated (when moist/soiled)   - Every shift skin assessment per nursing and weekly ICU skin rounds  - Moisture barrier to be applied with christopher care  - Active skin problems addressed with nursing on daily rounds     Proph:  SCDs  SQH  Home PPI     G: Lines  Right radial arterial line placed 2/16  RIJ introducer with PAC placed 2/16-->dc PA cath  [facility] Trialysis placed 2/17 --> dc today     Code status: Full Code      Restraints: The indications and risks/benefits of non-violent/non self-destructive restraints were discussed and are indicated for the safety of the patient.      I personally spent 60 minutes of critical care time directly and personally managing the patient exclusive of separately billable procedures.     A,B,C,D,E,F,G: reviewed     Dispo: CTICU care for now.    CTICU TEAM PHONE [phone]

## 2024-03-03 NOTE — PROGRESS NOTES
"Charla Bowles is a 33 y.o. female on day 16 of admission presenting with Cardiogenic shock (CMS/HCC).    S/p IABP removal, rt axilla Impella placement 3/1/24.   Intubated. Remains on inotropics.  Off CVVH since 2/27/24 followed by autodiuresis and trend down of serum Cr.   UOP dropping and Cr rising after ECMO decannulation.    Last Recorded Vitals  Blood pressure 102/82, pulse (!) 152, temperature 37 °C (98.6 °F), temperature source Temporal, resp. rate 12, height 1.549 m (5' 0.98\"), weight 91.3 kg (201 lb 4.5 oz), SpO2 100 %.  Intake/Output last 3 Shifts:  I/O last 3 completed shifts:  In: 3630.3 (39.8 mL/kg) [P.O.:240; I.V.:1423.3 (15.6 mL/kg); Blood:972; NG/GT:195; IV Piggyback:800]  Out: 1920 (21 mL/kg) [Urine:1285 (0.4 mL/kg/hr); Emesis/NG output:10; Drains:25; Blood:600]  Weight: 91.3 kg     General: No distress   CVS: S1 S2 no murmurs  RESP:  Lungs CTAB on RA  ABDO: Soft, non-tender   Neuro: A + O x 3  Skin: No rash   Extremities: No edema     Labs:  Results for orders placed or performed during the hospital encounter of 02/15/24 (from the past 24 hour(s))   BLOOD GAS MIXED VENOUS FULL PANEL   Result Value Ref Range    POCT pH, Mixed 7.37 7.33 - 7.43 pH    POCT pCO2, Mixed 39 (L) 41 - 51 mm Hg    POCT pO2, Mixed 48 (H) 35 - 45 mm Hg    POCT SO2, Mixed 81 (H) 45 - 75 %    POCT Oxy Hemoglobin, Mixed 80.0 (H) 45.0 - 75.0 %    POCT Hematocrit Calculated, Mixed 26.0 (L) 36.0 - 46.0 %    POCT Sodium, Mixed 133 (L) 136 - 145 mmol/L    POCT Potassium, Mixed 4.1 3.5 - 5.3 mmol/L    POCT Chloride, Mixed 100 98 - 107 mmol/L    POCT Ionized Calcium, Mixed 1.16 1.10 - 1.33 mmol/L    POCT Glucose, Mixed 115 (H) 74 - 99 mg/dL    POCT Lactate, Mixed 0.8 0.4 - 2.0 mmol/L    POCT Base Excess, Mixed -2.6 (L) -2.0 - 3.0 mmol/L    POCT HCO3 Calculated, Mixed 22.5 22.0 - 26.0 mmol/L    POCT Hemoglobin, Mixed 8.5 (L) 12.0 - 16.0 g/dL    POCT Anion Gap, Mixed 15 10 - 25 mmo/L    Patient Temperature 37.0 degrees Celsius    " FiO2 40 %   Calcium, Ionized   Result Value Ref Range    POCT Calcium, Ionized 1.11 1.1 - 1.33 mmol/L   CBC   Result Value Ref Range    WBC 10.0 4.4 - 11.3 x10*3/uL    nRBC 0.0 0.0 - 0.0 /100 WBCs    RBC 2.54 (L) 4.00 - 5.20 x10*6/uL    Hemoglobin 7.4 (L) 12.0 - 16.0 g/dL    Hematocrit 21.4 (L) 36.0 - 46.0 %    MCV 84 80 - 100 fL    MCH 29.1 26.0 - 34.0 pg    MCHC 34.6 32.0 - 36.0 g/dL    RDW 14.6 (H) 11.5 - 14.5 %    Platelets 121 (L) 150 - 450 x10*3/uL   Renal function panel   Result Value Ref Range    Glucose 114 (H) 74 - 99 mg/dL    Sodium 136 136 - 145 mmol/L    Potassium 3.9 3.5 - 5.3 mmol/L    Chloride 99 98 - 107 mmol/L    Bicarbonate 22 21 - 32 mmol/L    Anion Gap 19 10 - 20 mmol/L    Urea Nitrogen 85 (H) 6 - 23 mg/dL    Creatinine 3.41 (H) 0.50 - 1.05 mg/dL    eGFR 18 (L) >60 mL/min/1.73m*2    Calcium 8.7 8.6 - 10.6 mg/dL    Phosphorus 3.4 2.5 - 4.9 mg/dL    Albumin 4.2 3.4 - 5.0 g/dL   Magnesium   Result Value Ref Range    Magnesium 1.94 1.60 - 2.40 mg/dL   Coagulation Screen   Result Value Ref Range    Protime 12.3 9.8 - 12.8 seconds    INR 1.1 0.9 - 1.1    aPTT 29 27 - 38 seconds   Lactate dehydrogenase   Result Value Ref Range     (H) 84 - 246 U/L   Type and screen   Result Value Ref Range    ABO TYPE O     Rh TYPE POS     ANTIBODY SCREEN NEG    Tacrolimus level   Result Value Ref Range    Tacrolimus  6.9 <=15.0 ng/mL   Sirolimus level   Result Value Ref Range    Sirolimus  4.7 4.0 - 20.0 ng/mL   BLOOD GAS MIXED VENOUS FULL PANEL   Result Value Ref Range    POCT pH, Mixed 7.38 7.33 - 7.43 pH    POCT pCO2, Mixed 37 (L) 41 - 51 mm Hg    POCT pO2, Mixed 48 (H) 35 - 45 mm Hg    POCT SO2, Mixed 81 (H) 45 - 75 %    POCT Oxy Hemoglobin, Mixed 78.6 (H) 45.0 - 75.0 %    POCT Hematocrit Calculated, Mixed 27.0 (L) 36.0 - 46.0 %    POCT Sodium, Mixed 133 (L) 136 - 145 mmol/L    POCT Potassium, Mixed 3.9 3.5 - 5.3 mmol/L    POCT Chloride, Mixed 99 98 - 107 mmol/L    POCT Ionized Calcium, Mixed 1.16 1.10 -  1.33 mmol/L    POCT Glucose, Mixed 179 (H) 74 - 99 mg/dL    POCT Lactate, Mixed 0.5 0.4 - 2.0 mmol/L    POCT Base Excess, Mixed -2.9 (L) -2.0 - 3.0 mmol/L    POCT HCO3 Calculated, Mixed 21.9 (L) 22.0 - 26.0 mmol/L    POCT Hemoglobin, Mixed 8.9 (L) 12.0 - 16.0 g/dL    POCT Anion Gap, Mixed 16 10 - 25 mmo/L    Patient Temperature 37.0 degrees Celsius    FiO2 40 %   CBC   Result Value Ref Range    WBC 15.5 (H) 4.4 - 11.3 x10*3/uL    nRBC 0.0 0.0 - 0.0 /100 WBCs    RBC 2.90 (L) 4.00 - 5.20 x10*6/uL    Hemoglobin 8.5 (L) 12.0 - 16.0 g/dL    Hematocrit 23.7 (L) 36.0 - 46.0 %    MCV 82 80 - 100 fL    MCH 29.3 26.0 - 34.0 pg    MCHC 35.9 32.0 - 36.0 g/dL    RDW 14.1 11.5 - 14.5 %    Platelets 119 (L) 150 - 450 x10*3/uL   Blood Gas Arterial Full Panel   Result Value Ref Range    POCT pH, Arterial 7.43 (H) 7.38 - 7.42 pH    POCT pCO2, Arterial 32 (L) 38 - 42 mm Hg    POCT pO2, Arterial 167 (H) 85 - 95 mm Hg    POCT SO2, Arterial 99 94 - 100 %    POCT Oxy Hemoglobin, Arterial 97.6 94.0 - 98.0 %    POCT Hematocrit Calculated, Arterial 27.0 (L) 36.0 - 46.0 %    POCT Sodium, Arterial 133 (L) 136 - 145 mmol/L    POCT Potassium, Arterial 3.9 3.5 - 5.3 mmol/L    POCT Chloride, Arterial 100 98 - 107 mmol/L    POCT Ionized Calcium, Arterial 1.18 1.10 - 1.33 mmol/L    POCT Glucose, Arterial 169 (H) 74 - 99 mg/dL    POCT Lactate, Arterial 0.5 0.4 - 2.0 mmol/L    POCT Base Excess, Arterial -2.6 (L) -2.0 - 3.0 mmol/L    POCT HCO3 Calculated, Arterial 21.2 (L) 22.0 - 26.0 mmol/L    POCT Hemoglobin, Arterial 9.1 (L) 12.0 - 16.0 g/dL    POCT Anion Gap, Arterial 16 10 - 25 mmo/L    Patient Temperature 37.0 degrees Celsius    FiO2 40 %    Peep CHM2O 10.0 cm H2O   Magnesium   Result Value Ref Range    Magnesium 2.34 1.60 - 2.40 mg/dL   CBC   Result Value Ref Range    WBC 20.5 (H) 4.4 - 11.3 x10*3/uL    nRBC 0.0 0.0 - 0.0 /100 WBCs    RBC 3.12 (L) 4.00 - 5.20 x10*6/uL    Hemoglobin 8.9 (L) 12.0 - 16.0 g/dL    Hematocrit 26.9 (L) 36.0 - 46.0 %     MCV 86 80 - 100 fL    MCH 28.5 26.0 - 34.0 pg    MCHC 33.1 32.0 - 36.0 g/dL    RDW 14.5 11.5 - 14.5 %    Platelets 120 (L) 150 - 450 x10*3/uL   Calcium, Ionized   Result Value Ref Range    POCT Calcium, Ionized 1.15 1.1 - 1.33 mmol/L   Renal function panel   Result Value Ref Range    Glucose 169 (H) 74 - 99 mg/dL    Sodium 135 (L) 136 - 145 mmol/L    Potassium 3.9 3.5 - 5.3 mmol/L    Chloride 98 98 - 107 mmol/L    Bicarbonate 20 (L) 21 - 32 mmol/L    Anion Gap 21 (H) 10 - 20 mmol/L    Urea Nitrogen 92 (HH) 6 - 23 mg/dL    Creatinine 3.50 (H) 0.50 - 1.05 mg/dL    eGFR 17 (L) >60 mL/min/1.73m*2    Calcium 9.2 8.6 - 10.6 mg/dL    Phosphorus 3.6 2.5 - 4.9 mg/dL    Albumin 4.5 3.4 - 5.0 g/dL   Blood Gas Arterial Full Panel   Result Value Ref Range    POCT pH, Arterial 7.41 7.38 - 7.42 pH    POCT pCO2, Arterial 34 (L) 38 - 42 mm Hg    POCT pO2, Arterial 154 (H) 85 - 95 mm Hg    POCT SO2, Arterial 99 94 - 100 %    POCT Oxy Hemoglobin, Arterial 97.9 94.0 - 98.0 %    POCT Hematocrit Calculated, Arterial 28.0 (L) 36.0 - 46.0 %    POCT Sodium, Arterial 131 (L) 136 - 145 mmol/L    POCT Potassium, Arterial 4.0 3.5 - 5.3 mmol/L    POCT Chloride, Arterial 98 98 - 107 mmol/L    POCT Ionized Calcium, Arterial 1.17 1.10 - 1.33 mmol/L    POCT Glucose, Arterial 172 (H) 74 - 99 mg/dL    POCT Lactate, Arterial 0.5 0.4 - 2.0 mmol/L    POCT Base Excess, Arterial -2.6 (L) -2.0 - 3.0 mmol/L    POCT HCO3 Calculated, Arterial 21.6 (L) 22.0 - 26.0 mmol/L    POCT Hemoglobin, Arterial 9.2 (L) 12.0 - 16.0 g/dL    POCT Anion Gap, Arterial 15 10 - 25 mmo/L    Patient Temperature 37.0 degrees Celsius    FiO2 40 %   Blood Gas Arterial Full Panel   Result Value Ref Range    POCT pH, Arterial 7.42 7.38 - 7.42 pH    POCT pCO2, Arterial 32 (L) 38 - 42 mm Hg    POCT pO2, Arterial 178 (H) 85 - 95 mm Hg    POCT SO2, Arterial 100 94 - 100 %    POCT Oxy Hemoglobin, Arterial 97.9 94.0 - 98.0 %    POCT Hematocrit Calculated, Arterial 29.0 (L) 36.0 - 46.0 %     "POCT Sodium, Arterial 132 (L) 136 - 145 mmol/L    POCT Potassium, Arterial 4.1 3.5 - 5.3 mmol/L    POCT Chloride, Arterial 101 98 - 107 mmol/L    POCT Ionized Calcium, Arterial 1.16 1.10 - 1.33 mmol/L    POCT Glucose, Arterial 169 (H) 74 - 99 mg/dL    POCT Lactate, Arterial 0.5 0.4 - 2.0 mmol/L    POCT Base Excess, Arterial -3.1 (L) -2.0 - 3.0 mmol/L    POCT HCO3 Calculated, Arterial 20.8 (L) 22.0 - 26.0 mmol/L    POCT Hemoglobin, Arterial 9.6 (L) 12.0 - 16.0 g/dL    POCT Anion Gap, Arterial 14 10 - 25 mmo/L    Patient Temperature 37.0 degrees Celsius    FiO2 21 %     *Note: Due to a large number of results and/or encounters for the requested time period, some results have not been displayed. A complete set of results can be found in Results Review.         Assessment/Plan     Charla Bowles is a 33 y.o. 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       HIRAM on CKD stage 3: now non oliguric (BL 1.2)   -HIRAM in the setting of CRS, cardiogenic shock.  Started on CRRT on 2/19/2024 for volume management. Discontinued 2/27.   - recent UOP and serum Cr slightly worse since ECMO decannulation. S/p Impella placement 3/1/24.  - will monitor closely for RRT needs  -electrolytes: WNL  - acid base: WNL  - anemia: on iron tabs  - BMD: calcium and phos WNL       Immunosuppression:   As per the heart transplant team      Cardiogenic shock  -S/p endomyocardial biopsies on 2/16 and 2/20 negative for ACR and AMR.  DSA negative so far. S/p pulse methylprednisolone 1 g for 3 days from 2/16- 2/18, Thymoglobulin -2 doses given on 2/18 and 2/19, IV immunoglobulin plus Plex sessions done on 2/18 and 2/20.  -s/p [facility] ECMO and on IABP " support    Pelon Suazo MD

## 2024-03-03 NOTE — PROGRESS NOTES
Charla Bowles is a 33 y.o. female on day 17 of admission presenting with Cardiogenic shock (CMS/HCC).    Subjective   Patient discussed in morning handover, patient examined and chart reviewed. Meds, labs and radiology reviewed during full interdisciplinary rounds this morning. Will plan to review with HF team this morning.    HPI: Patient with a previous history of heart transplant in March of 2022, admitted for cardiogenic shock with concerns for acute cellular rejection and associated multiorgan dysfunction including significant elevation of liver enzymes and nonoliguric acute kidney injury.     Patient presented to [facility] ED on 2/15/24 with complaints of N/V x 3 days and inability to keep medications down. POCUS revealed acute systolic heart failure w/ severe BIV dysfunction when compared to previous images in 11/2023. Also noted to have HIRAM requiring CVVH and transaminitis. Immunosuppression notably below therapeutic range. Admitted to HFICU and treated for suspected acute cellular vs. acute antibody mediated rejection (Endomyocardial biopsy on 2/16 revealed mild ACR with +CD4s and negative HLAs). Despite rejection therapy, inotropes and MCS via IABP (2/18/24), the patient's end organ function continued to worsen without improvement in cardiac function. Ultimately placed on left femoral VA ECMO w/ Dr. Marina on 2/19/2024 and transferred to CTICU.     Course in Hospital:   2/17 - received PLEX and IVIG for presumed rejection  2/18 - s/p IABP placement (R fem); also received thymoglobulin for presumed rejection  2/19 - escalation to [facility] ECMO (left fem); additional dose of thymo  2/20 -  PLEX/IVIG; TTE - persisting severe BiV dysfunction despite negative biopsy (ACR 0R, pAMR0 and no C3D or C4D)  2/22 - Echo with turn-down; EF 10%, severely reduced RV, mild AR and mild-moderate MR  2/24 - Extubated  2/27 - Repeat turndown (0.8L); LVEF improved to 30% and moderate RV; CVVH stopped and patient responding to IV  diuretics   2/28 - TTE with EF 30% and reduced RV (on 0L flow)  2/29 - ECMO decannulation; Post-decannulation EF reported 44% with IABP 1: 1 and milrinone 125   1/3 - Worsening clinical status and oliguria; decision made to take back to the OR for R axillary Impella insertion (5.5) and removal of IABP.  2/3 - Patient extubated; neuro intact    PMH:  stage D HFrEF (PPCM) s/p ICD s/p CardioMEMs,    and SLE who is s/p OHT (3/31/2022) with a post-OHT course complicated by RIJ/RUE DVTs, leukopenia, left groin seroma, and asymptomatic 2R ACR (11/2022) treated with steroids, s/p total hysterectomy (2023), Flu A (1/2024).   Remote DVT   Hypothyroidism 2/2 thyroidectomy  Obesity s/p gastric bypass (2017)  MMF colitis    Overnight: Remains tachycardiac (120-140s). Hemodynamically acceptable with current therapies     Objective     Physical Exam  Constitutional:       Interventions: She is not intubated.     Comments: Slept well overnight   Eyes:      Pupils: Pupils are equal, round, and reactive to light.   Cardiovascular:      Rate and Rhythm: Regular rhythm. Tachycardia present.      Comments: Milrinone: 0.25    Lines:   L radial art. Line 3/1   RIJ introducer with PAC placed 2/16  LIF Trialysis placed 2/17   Pulmonary:      Effort: She is not intubated.      Breath sounds: Decreased breath sounds present.   Abdominal:      Palpations: Abdomen is soft.      Tenderness: There is no abdominal tenderness. There is no guarding or rebound.   Genitourinary:     Comments: Singh in situ    Neurological:      Mental Status: She is alert.   Psychiatric:         Behavior: Behavior is cooperative.       Impella Interogation:      Most Recent Range Past 24hrs   Performance Level 9 P Level  Min: 8   Min taken time: 03/02/24 1500  Max: 9   Max taken time: 03/03/24 0700   Flow (L/min) 5.3 Flow (L/min)  Min: 4.3   Min taken time: 03/02/24 1000  Max: 5.3   Max taken time: 03/03/24 0700   Motor Current 624/527 Motor Current  Min: 479/393    "Min taken time: 03/02/24 1500  Max: 647/508   Max taken time: 03/03/24 0200   Placement Signal Yes  Placement OK could not be evaluated. This SmartLink does not work with rows of the type: Custom List   Purge (mmHg) 441 Purge Pressure (mmHg)  Min: 42   Min taken time: 03/02/24 0800  Max: 510   Max taken time: 03/02/24 1700   Purge rate (mL/hr) 12.8 Purge Rate (mL/hr)  Min: 11.5   Min taken time: 03/03/24 0600  Max: 13.2   Max taken time: 03/02/24 1900   No suction alarms overnight.  (up slightly 254) pending from this morning    Last Recorded Vitals  Blood pressure 102/82, pulse (!) 148, temperature 36.4 °C (97.5 °F), temperature source Temporal, resp. rate 20, height 1.549 m (5' 0.98\"), weight 90.2 kg (198 lb 13.7 oz), SpO2 100 %.  Intake/Output last 3 Shifts:  I/O last 3 completed shifts:  In: 3404.9 (37.3 mL/kg) [P.O.:450; I.V.:1437.9 (15.7 mL/kg); Blood:622; NG/GT:195; IV Piggyback:700]  Out: 2125 (23.3 mL/kg) [Urine:1465 (0.4 mL/kg/hr); Emesis/NG output:10; Drains:50; Blood:600]  Weight: 91.3 kg      Assessment/Plan     ICU Liberation Bundle:  A-Analgesia: Tylenol and opioids at lowest effective dose  B-SBT: pending  C-RASS Target: 0 - -1  D-CAM: Negative this morning on low dose sedation  E-Mobilization Plan: Stage V-VIII (once extubated)  F - Family Last Update: Mother on rounds this morning    Daily Checklist:  HOB > 30 degrees: achieved  Feeding: NPO for possible extubation  Thromboprophylaxis: Heparin s.c. and SCDs (no systemic heparin at present in discussion with surgeon)  Ulcer Prophylaxis: PPI  Glucose Control: Target ; no hypoglycemia  Line to removed: discontinue R art. line (leave L art. Line in); discontinue Singh today  Bowel Care: Bowel regime ordered  De-escalation of Antibiotics: None at present    Plan:  Acute respiratory insufficiency - now extubated; will continue with ICU rehab today  Low cardiac output - Leave Impella @ P9; leave milrinone at 0.25  HIRAM creatine stable at ~3.5 " - target neutral for 24 hours  Continue with thyroid meds  Mobilize and start ICU rehab; HF will be discussing her case next for possible re-listing for transplant.  7.   Thrombocytopenic - PF4 previous negative. Consider surveillance venous ultrasounds tomorrow  6.   Will request PICC line tomorrow and remove central line    I spent 58 minutes in the professional and overall care of this patient.    This critically ill patient continues to be at-risk for clinically significant deterioration / failure due to the above mentioned dysfunctional, unstable organ systems.  I have personally identified and managed all complex critical care issues to prevent aforementioned clinical deterioration.  Critical care time is spent at bedside and/or the immediate area and has included, but is not limited to, the review of diagnostic tests, labs, radiographs, serial assessments of hemodynamics, respiratory status, ventilatory management, and family updates.  Time spent in procedures and teaching are reported separately.    Eduardo Banks MD

## 2024-03-03 NOTE — PROGRESS NOTES
"Charla Bowles is a 33 y.o. female on day 17 of admission presenting with Cardiogenic shock (CMS/HCC).    S/p IABP removal, rt axilla Impella placement 3/1/24.   Off vent. Remains on milrinone.   UOP ~1.5L. Cr stable ~3.5.  Off CVVH since 2/27/24 followed by autodiuresis and trend down of serum Cr.     Last Recorded Vitals  Blood pressure 97/77, pulse (!) 146, temperature 36.4 °C (97.5 °F), temperature source Temporal, resp. rate 21, height 1.549 m (5' 0.98\"), weight 90.2 kg (198 lb 13.7 oz), SpO2 100 %.  Intake/Output last 3 Shifts:  I/O last 3 completed shifts:  In: 3404.9 (37.3 mL/kg) [P.O.:450; I.V.:1437.9 (15.7 mL/kg); Blood:622; NG/GT:195; IV Piggyback:700]  Out: 2125 (23.3 mL/kg) [Urine:1465 (0.4 mL/kg/hr); Emesis/NG output:10; Drains:50; Blood:600]  Weight: 91.3 kg     General: No distress   CVS: S1 S2 no murmurs  RESP:  Lungs CTAB on RA  ABDO: Soft, non-tender   Neuro: A + O x 3  Skin: No rash   Extremities: No edema     Labs:  Results for orders placed or performed during the hospital encounter of 02/15/24 (from the past 24 hour(s))   Blood Gas Arterial Full Panel   Result Value Ref Range    POCT pH, Arterial 7.42 7.38 - 7.42 pH    POCT pCO2, Arterial 32 (L) 38 - 42 mm Hg    POCT pO2, Arterial 101 (H) 85 - 95 mm Hg    POCT SO2, Arterial 99 94 - 100 %    POCT Oxy Hemoglobin, Arterial 96.7 94.0 - 98.0 %    POCT Hematocrit Calculated, Arterial 29.0 (L) 36.0 - 46.0 %    POCT Sodium, Arterial 130 (L) 136 - 145 mmol/L    POCT Potassium, Arterial 3.9 3.5 - 5.3 mmol/L    POCT Chloride, Arterial 98 98 - 107 mmol/L    POCT Ionized Calcium, Arterial 1.21 1.10 - 1.33 mmol/L    POCT Glucose, Arterial 150 (H) 74 - 99 mg/dL    POCT Lactate, Arterial 0.9 0.4 - 2.0 mmol/L    POCT Base Excess, Arterial -3.1 (L) -2.0 - 3.0 mmol/L    POCT HCO3 Calculated, Arterial 20.8 (L) 22.0 - 26.0 mmol/L    POCT Hemoglobin, Arterial 9.7 (L) 12.0 - 16.0 g/dL    POCT Anion Gap, Arterial 15 10 - 25 mmo/L    Patient Temperature 37.0 " degrees Celsius    FiO2 25 %   Calcium, Ionized   Result Value Ref Range    POCT Calcium, Ionized 1.16 1.1 - 1.33 mmol/L   CBC   Result Value Ref Range    WBC 16.5 (H) 4.4 - 11.3 x10*3/uL    nRBC 0.0 0.0 - 0.0 /100 WBCs    RBC 2.81 (L) 4.00 - 5.20 x10*6/uL    Hemoglobin 8.4 (L) 12.0 - 16.0 g/dL    Hematocrit 24.7 (L) 36.0 - 46.0 %    MCV 88 80 - 100 fL    MCH 29.9 26.0 - 34.0 pg    MCHC 34.0 32.0 - 36.0 g/dL    RDW 14.6 (H) 11.5 - 14.5 %    Platelets 108 (L) 150 - 450 x10*3/uL   Coagulation Screen   Result Value Ref Range    Protime 13.1 (H) 9.8 - 12.8 seconds    INR 1.2 (H) 0.9 - 1.1    aPTT 27 27 - 38 seconds   Magnesium   Result Value Ref Range    Magnesium 2.42 (H) 1.60 - 2.40 mg/dL   Renal function panel   Result Value Ref Range    Glucose 140 (H) 74 - 99 mg/dL    Sodium 134 (L) 136 - 145 mmol/L    Potassium 3.8 3.5 - 5.3 mmol/L    Chloride 97 (L) 98 - 107 mmol/L    Bicarbonate 20 (L) 21 - 32 mmol/L    Anion Gap 21 (H) 10 - 20 mmol/L    Urea Nitrogen 88 (H) 6 - 23 mg/dL    Creatinine 3.47 (H) 0.50 - 1.05 mg/dL    eGFR 17 (L) >60 mL/min/1.73m*2    Calcium 9.4 8.6 - 10.6 mg/dL    Phosphorus 4.2 2.5 - 4.9 mg/dL    Albumin 4.5 3.4 - 5.0 g/dL   Tacrolimus level   Result Value Ref Range    Tacrolimus  7.7 <=15.0 ng/mL   Sirolimus level   Result Value Ref Range    Sirolimus  4.6 4.0 - 20.0 ng/mL   Lactate Dehydrogenase   Result Value Ref Range     (H) 84 - 246 U/L   Hepatic function panel   Result Value Ref Range    Albumin 4.6 3.4 - 5.0 g/dL    Bilirubin, Total 0.8 0.0 - 1.2 mg/dL    Bilirubin, Direct 0.2 0.0 - 0.3 mg/dL    Alkaline Phosphatase 41 33 - 110 U/L    ALT 7 7 - 45 U/L    AST 29 9 - 39 U/L    Total Protein 6.2 (L) 6.4 - 8.2 g/dL   POCT GLUCOSE   Result Value Ref Range    POCT Glucose 148 (H) 74 - 99 mg/dL   POCT GLUCOSE   Result Value Ref Range    POCT Glucose 204 (H) 74 - 99 mg/dL   POCT GLUCOSE   Result Value Ref Range    POCT Glucose 229 (H) 74 - 99 mg/dL     *Note: Due to a large number of  "results and/or encounters for the requested time period, some results have not been displayed. A complete set of results can be found in Results Review.         Assessment/Plan     Charla Bowles is a 33 y.o. 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       HIRAM on CKD stage 3: (baseline Cr 1.2)   -HIRAM in the setting of CRS, cardiogenic shock.  Started on CRRT on 2/19/2024 for volume management. Discontinued 2/27.   - serum Cr improved and stable for few days, worsened after ECMO decannulation. S/p Impella placement 3/1/24.  - currently, Cr stable ~3.5. nonoliguric  - no current indication for RRT. will monitor closely for RRT needs  -electrolytes: WNL  - acid base: WNL  - anemia: on iron tabs  - BMD: calcium and phos WNL       Immunosuppression:   As per the heart transplant team      Cardiogenic shock  -S/p endomyocardial biopsies on 2/16 and 2/20 without definitive findings but pt treated for presumed rejection.  DSA negative so far. S/p pulse methylprednisolone 1 g for 3 days from 2/16- 2/18, Thymoglobulin -2 doses given on 2/18 and 2/19, IV immunoglobulin plus PLEX sessions done on 2/18 and 2/20.  -s/p VA ECMO and on IABP support till 3/1/24. S/p Impella 3/1/24.    Pelon Suazo MD      "

## 2024-03-03 NOTE — CONSULTS
Vascular Access Team  Consult     Visit Date: 3/3/2024      Patient Name: Charla Bowles         MRN: [id number]                Reason for Consult: PICC line pending placement on results of bilateral arm ultrasound and note that it is also OK to place with GFR<30.           Bernadette Call RN  3/3/2024  4:34 PM

## 2024-03-03 NOTE — SIGNIFICANT EVENT
ENT significant event note    ENT reengaged due to epistaxis left greater than right and all anterior.  This has slowed down but not stop completely with rolled gauze and nasal clip.    Verbal consent was obtained for patient.  Floseal was then injected in bilateral naris left greater than right and nasal clip was then reapplied.  Exam at that time showed bleeding a slow ooze from right greater than left anterior septum.  Patient tolerated procedure well.  There were no complications.    Please reengage ENT with further bleeding    Giuseppe Lagos, PGY2  I am the day resident. I can only be contacted from 6am to 5pm. Page on weekends or off hours.   Otolaryngology - Head & Neck Surgery  ENT Consult pager: [phone]  Peds pager: [phone]  Adult Head & Neck Phone: [phone]  ENT subspecialty team: Manan individual resident who wrote today's note  Please page if urgent

## 2024-03-03 NOTE — PROGRESS NOTES
"[facility]  ADVANCED HEART FAILURE AND TRANSPLANT CARDIOLOGY     Charla Bowles is a 33 y.o. female on day 12 of admission presenting with Cardiogenic shock (CMS/HCC).    INTERVAL EVENTS / PERTINENT ROS:    No major overnight events. Extubated yesterday to ZAIRE Romero at P9 this morning.    Objective     Physical Exam  Constitutional:       General: She is not in acute distress.     Appearance: She is obese.      Comments: Sedated   HENT:      Head: Normocephalic.      Comments: +[place] catheter on right internal jugular, Trial[place] line on left IJ     Mouth/Throat:      Mouth: Mucous membranes are moist.   Eyes:      Pupils: Pupils are equal, round, and reactive to light.   Cardiovascular:      Rate and Rhythm: Regular rhythm. Tachycardia present.      Pulses: Normal pulses.      Heart sounds: Normal heart sounds. No murmur heard.     No friction rub. No gallop.      Comments: R axillary 5.5 impella.   Pulmonary:      Effort: Pulmonary effort is normal. No accessory muscle usage or retractions.      Breath sounds: No wheezing, rhonchi or rales.      Comments: Equal chest rise bilaterally.   Abdominal:      General: Abdomen is flat. Bowel sounds are normal. There is no distension.      Palpations: Abdomen is soft. There is no mass.      Tenderness: There is no abdominal tenderness. There is no guarding.      Hernia: No hernia is present.   Musculoskeletal:      Cervical back: Full passive range of motion without pain.   Skin:     General: Skin is warm and dry.      Capillary Refill: Capillary refill takes less than 2 seconds.      Coloration: Skin is not jaundiced.      Findings: No laceration, rash or wound.   Neurological:      Comments: Intubated, sedated       Last Recorded Vitals  Blood pressure 102/82, pulse (!) 144, temperature 36.4 °C (97.5 °F), temperature source Temporal, resp. rate 26, height 1.549 m (5' 0.98\"), weight 90.2 kg (198 lb 13.7 oz), SpO2 100 " %.  Intake/Output last 3 Shifts:  I/O last 3 completed shifts:  In: 3404.9 (37.3 mL/kg) [P.O.:450; I.V.:1437.9 (15.7 mL/kg); Blood:622; NG/GT:195; IV Piggyback:700]  Out: 2125 (23.3 mL/kg) [Urine:1465 (0.4 mL/kg/hr); Emesis/NG output:10; Drains:50; Blood:600]  Weight: 91.3 kg     Relevant Results  Scheduled medications  acetaminophen, 975 mg, oral, q6h  aspirin, 81 mg, oral, Daily  calcitriol, 0.5 mcg, oral, Daily  ferrous sulfate (325 mg ferrous sulfate), 65 mg of iron, oral, Daily with breakfast  heparin (porcine), 5,000 Units, subcutaneous, q8h  hydrALAZINE, 75 mg, oral, q8h  insulin lispro, 0-10 Units, subcutaneous, TID with meals  isosorbide dinitrate, 10 mg, oral, TID  lactulose, 20 g, oral, Once  levothyroxine, 200 mcg, oral, Daily  lidocaine, 5 mL, infiltration, Once  lidocaine, 1 patch, transdermal, Daily  melatonin, 10 mg, oral, Nightly  multivitamin with minerals, 1 tablet, oral, Daily  nystatin, 5 mL, Swish & Swallow, q6h  pantoprazole, 40 mg, oral, Daily before breakfast  polyethylene glycol, 17 g, oral, BID  predniSONE, 10 mg, oral, Daily  rosuvastatin, 40 mg, oral, Nightly  sennosides-docusate sodium, 2 tablet, oral, Daily  sirolimus, 2.5 mg, oral, Daily  sodium chloride, 5 spray, Each Nostril, 4x daily  [Held by provider] sulfamethoxazole-trimethoprim, 80 mg of trimethoprim, oral, Daily  tacrolimus, 4 mg, oral, q12h TRICIA  traZODone, 50 mg, oral, Nightly  valGANciclovir, 450 mg, oral, q48h  valGANciclovir, 450 mg, oral, q48h      Continuous medications  lactated Ringer's, 5 mL/hr, Last Rate: 5 mL/hr (03/03/24 0443)  lactated Ringer's, 10 mL/hr, Last Rate: Stopped (03/03/24 1200)  milrinone, 0.25 mcg/kg/min (Dosing Weight), Last Rate: 0.25 mcg/kg/min (03/03/24 1200)  sodium bicarbonate infusion Impella Purge 25 mEq/1000 mL D5W, 10 mL/hr      PRN medications  PRN medications: bisacodyl, calcium gluconate, calcium gluconate, dextrose, dextrose **OR** glucagon, glucagon, hydrALAZINE, artificial tears,  magnesium sulfate, magnesium sulfate, mupirocin, oxyCODONE, oxygen, oxymetazoline, potassium chloride, sodium chloride    Results for orders placed or performed during the hospital encounter of 02/15/24 (from the past 24 hour(s))   Blood Gas Arterial Full Panel   Result Value Ref Range    POCT pH, Arterial 7.42 7.38 - 7.42 pH    POCT pCO2, Arterial 32 (L) 38 - 42 mm Hg    POCT pO2, Arterial 178 (H) 85 - 95 mm Hg    POCT SO2, Arterial 100 94 - 100 %    POCT Oxy Hemoglobin, Arterial 97.9 94.0 - 98.0 %    POCT Hematocrit Calculated, Arterial 29.0 (L) 36.0 - 46.0 %    POCT Sodium, Arterial 132 (L) 136 - 145 mmol/L    POCT Potassium, Arterial 4.1 3.5 - 5.3 mmol/L    POCT Chloride, Arterial 101 98 - 107 mmol/L    POCT Ionized Calcium, Arterial 1.16 1.10 - 1.33 mmol/L    POCT Glucose, Arterial 169 (H) 74 - 99 mg/dL    POCT Lactate, Arterial 0.5 0.4 - 2.0 mmol/L    POCT Base Excess, Arterial -3.1 (L) -2.0 - 3.0 mmol/L    POCT HCO3 Calculated, Arterial 20.8 (L) 22.0 - 26.0 mmol/L    POCT Hemoglobin, Arterial 9.6 (L) 12.0 - 16.0 g/dL    POCT Anion Gap, Arterial 14 10 - 25 mmo/L    Patient Temperature 37.0 degrees Celsius    FiO2 21 %   Blood Gas Arterial Full Panel   Result Value Ref Range    POCT pH, Arterial 7.42 7.38 - 7.42 pH    POCT pCO2, Arterial 32 (L) 38 - 42 mm Hg    POCT pO2, Arterial 101 (H) 85 - 95 mm Hg    POCT SO2, Arterial 99 94 - 100 %    POCT Oxy Hemoglobin, Arterial 96.7 94.0 - 98.0 %    POCT Hematocrit Calculated, Arterial 29.0 (L) 36.0 - 46.0 %    POCT Sodium, Arterial 130 (L) 136 - 145 mmol/L    POCT Potassium, Arterial 3.9 3.5 - 5.3 mmol/L    POCT Chloride, Arterial 98 98 - 107 mmol/L    POCT Ionized Calcium, Arterial 1.21 1.10 - 1.33 mmol/L    POCT Glucose, Arterial 150 (H) 74 - 99 mg/dL    POCT Lactate, Arterial 0.9 0.4 - 2.0 mmol/L    POCT Base Excess, Arterial -3.1 (L) -2.0 - 3.0 mmol/L    POCT HCO3 Calculated, Arterial 20.8 (L) 22.0 - 26.0 mmol/L    POCT Hemoglobin, Arterial 9.7 (L) 12.0 - 16.0 g/dL     POCT Anion Gap, Arterial 15 10 - 25 mmo/L    Patient Temperature 37.0 degrees Celsius    FiO2 25 %   Calcium, Ionized   Result Value Ref Range    POCT Calcium, Ionized 1.16 1.1 - 1.33 mmol/L   CBC   Result Value Ref Range    WBC 16.5 (H) 4.4 - 11.3 x10*3/uL    nRBC 0.0 0.0 - 0.0 /100 WBCs    RBC 2.81 (L) 4.00 - 5.20 x10*6/uL    Hemoglobin 8.4 (L) 12.0 - 16.0 g/dL    Hematocrit 24.7 (L) 36.0 - 46.0 %    MCV 88 80 - 100 fL    MCH 29.9 26.0 - 34.0 pg    MCHC 34.0 32.0 - 36.0 g/dL    RDW 14.6 (H) 11.5 - 14.5 %    Platelets 108 (L) 150 - 450 x10*3/uL   Coagulation Screen   Result Value Ref Range    Protime 13.1 (H) 9.8 - 12.8 seconds    INR 1.2 (H) 0.9 - 1.1    aPTT 27 27 - 38 seconds   Magnesium   Result Value Ref Range    Magnesium 2.42 (H) 1.60 - 2.40 mg/dL   Renal function panel   Result Value Ref Range    Glucose 140 (H) 74 - 99 mg/dL    Sodium 134 (L) 136 - 145 mmol/L    Potassium 3.8 3.5 - 5.3 mmol/L    Chloride 97 (L) 98 - 107 mmol/L    Bicarbonate 20 (L) 21 - 32 mmol/L    Anion Gap 21 (H) 10 - 20 mmol/L    Urea Nitrogen 88 (H) 6 - 23 mg/dL    Creatinine 3.47 (H) 0.50 - 1.05 mg/dL    eGFR 17 (L) >60 mL/min/1.73m*2    Calcium 9.4 8.6 - 10.6 mg/dL    Phosphorus 4.2 2.5 - 4.9 mg/dL    Albumin 4.5 3.4 - 5.0 g/dL   Tacrolimus level   Result Value Ref Range    Tacrolimus  7.7 <=15.0 ng/mL   Lactate Dehydrogenase   Result Value Ref Range     (H) 84 - 246 U/L   Hepatic function panel   Result Value Ref Range    Albumin 4.6 3.4 - 5.0 g/dL    Bilirubin, Total 0.8 0.0 - 1.2 mg/dL    Bilirubin, Direct 0.2 0.0 - 0.3 mg/dL    Alkaline Phosphatase 41 33 - 110 U/L    ALT 7 7 - 45 U/L    AST 29 9 - 39 U/L    Total Protein 6.2 (L) 6.4 - 8.2 g/dL   POCT GLUCOSE   Result Value Ref Range    POCT Glucose 148 (H) 74 - 99 mg/dL     *Note: Due to a large number of results and/or encounters for the requested time period, some results have not been displayed. A complete set of results can be found in Results Review.          Assessment/Plan   Assessment: Ms. 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    Originally presented to [facility] ED on 2/15/24 with complaints of N/V x 3 days and inability to keep medications down. POCUS revealed acute systolic heart failure w/ severe BIV dysfunction when compared to previous images in 11/2023. Also noted to have HIRAM requiring CVVH and transaminitis. Immunosuppression notably below therapeutic range. Admitted to HFICU and treated for suspected acute cellular vs. acute antibody mediated rejection; however, cardiac biopsy negative for signs of rejection. Despite rejection therapy, inotropes and MCS via IABP (2/18/24), the patient's end organ function continued to worsen without improvement in cardiac function. Ultimately placed on left femoral VA ECMO w/ Dr. Marina on 2/19/2024 and transferred to CTICU.     CTICU course complicated by anemia requiring blood product transfusions, epistaxis, volume overload & intubation (tachypnea and increased work of breathing 2/2 pulmonary edema). Status post extubation on 2/24/24. Now showing renal recovery off CVVH and cardiac allograft recovery s/p ECMO decannulation on 2/29/24 w/ Dr. Witt.       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)  Toxo: -/-   Hep C: -/-    Rejection/Prophylaxis  (transplants):  - Steroids: Continue 10mg PO prednisone daily  - Tacrolimus: 4.0mg PO @ 0630 & 4.0mg PO @ 1830 w/ daily levels drawn @ 0600  - Serolimus: 2.5mg PO daily w/ daily levels drawn at 0600  - Tacrolimus goal troughs: 3-5  - Serolimus goal troughs: 7-9  - Combined sirolimus/tacrolimus goal of 10-12  - Antifungals: nystatin 500,000units Q6  - Antivirals: continue valcyte 450mg Q48hrs due to low grade viremia   - Anti PCP & Toxoplasmosis: Bactrim SS daily  --> Holding due to thrombocytopenia (2/23)    Last cardiac biopsy: 2/16/24 with ACR1 and no AMR  Last HLA: 2/16/24 negative for DSAs   Last RHC: 2/16/24 w/ RA 11, PAP 34/14(23), W 19 and C.I. 2.3  Last LHC: 2/18/24 negative for CAV and CAD   Last TTE/BOB: 3/1/24 shows LVEF to 30% and mild RV dysfunction (intra-op impella 5.5 insert)  Osteopenia/osteoporosis prophylaxis: Vitamin D3 and calcium supplements  Peptic/gastric ulcer prophylaxis: Pantoprazole 40mg daily   CAV Prophylaxis: Aspirin 81mg daily & pravastatin daily    - Unclear cause of acute severe graft dysfunction. Most likely smoldering ACR vs. AMR; however, 2xallograft biopsies on 2/16 & 2/20 remain negative for any significant pathology. Notably, both biopsies taken after rejection therapies implemented which may have reduced areas of graft damage.    - DSAs remain negative; however, patient may have non-HLA antibodies present. 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   - Negative CAV & CAD via LHC on 2/18/24   - Completed methylpred steroid pulse w/ 1g Q24 x 3 days (2/16-2/18) and prednisone taper   - Thymoglobulin doses: 2/18 & 2/19   - IVIG + PLEX Session: 2/18 & 2/20   - Extubated on 3/2/24.  - CVVH stopped on 2/27/24. HIRAM previously improving and autodiuresing; however, since ECMO decannulation, UOP has decreased and creatinine uptrending   - Graft function slightly worse after transition to impella 5.5. IABP removed 3/1/24.  - On Milrinone 0.25mcg/kg/min. Impella up to P9. SGC  removed 3/2/24. Team is trying to liberate from lines as able.     Recommendations:  - Continue milrinone 0.25mcg/kg/min for now, weaning as tolerated.  - Oral afterload increased with hydral up to 75mg TID. Recommend starting isordil today as well.  - Continue to uptitrate PO meds as BP tolerates.  - Continue valcyte 450mg Q48hrs and continue to check CMV PCRs weekly (next due on 3/8/24)   - Tac level 7.7 today; reduce to 3mg tonight and resume at 3.5mg BID tomorrow morning.   - Plan for biopsy on 3/6/24 (will need case request placed next week)   - May need reevaluation for repeat HT; to be discussed with primary HF cardiologist as well.     Appreciate continued CTICU care/management.    Patient was examined and discussed with HF attending, Dr. UMA Padilla & CTICU team     Jason Burns DO  Advanced Heart Failure & Transplant Fellow

## 2024-03-03 NOTE — PROGRESS NOTES
CTICU Progress Note  Charla Bowles/[id number]    Admit Date: 2/15/2024  Hospital Length of Stay: 17   ICU Length of Stay: 12d 14h   CT SURGEON:     SUBJECTIVE:   Slept well  Remains tachy  250ml for low UOP    MEDICATIONS  Infusions:  lactated Ringer's, Last Rate: 5 mL/hr (03/03/24 0443)  lactated Ringer's, Last Rate: 10 mL/hr (03/03/24 0443)  milrinone, Last Rate: 0.375 mcg/kg/min (03/03/24 0443)  sodium bicarbonate infusion Impella Purge 25 mEq/1000 mL D5W      Scheduled:  acetaminophen, 975 mg, q6h  aspirin, 81 mg, Daily  calcitriol, 0.5 mcg, Daily  ferrous sulfate (325 mg ferrous sulfate), 65 mg of iron, Daily with breakfast  heparin (porcine), 5,000 Units, q8h  hydrALAZINE, 50 mg, q8h  insulin lispro, 0-10 Units, TID with meals  levothyroxine, 200 mcg, Daily  lidocaine, 1 patch, Daily  melatonin, 10 mg, Nightly  multivitamin with minerals, 1 tablet, Daily  nystatin, 5 mL, q6h  [Held by provider] pantoprazole, 40 mg, Daily before breakfast  polyethylene glycol, 17 g, BID  predniSONE, 10 mg, Daily  rosuvastatin, 40 mg, Nightly  sennosides-docusate sodium, 2 tablet, Daily  sirolimus, 2.5 mg, Daily  sodium chloride, 5 spray, 4x daily  [Held by provider] sulfamethoxazole-trimethoprim, 80 mg of trimethoprim, Daily  tacrolimus, 4 mg, q12h TRICIA  traZODone, 50 mg, Nightly  valGANciclovir, 450 mg, q48h  valGANciclovir, 450 mg, q48h      PRN:  bisacodyl, 10 mg, Daily PRN  calcium gluconate, 1 g, q6h PRN  calcium gluconate, 2 g, q6h PRN  dextrose, 25 g, q15 min PRN  dextrose, 25 g, q15 min PRN   Or  glucagon, 1 mg, q15 min PRN  glucagon, 1 mg, q15 min PRN  hydrALAZINE, 20 mg, q4h PRN  HYDROmorphone, 0.2 mg, q15 min PRN  artificial tears, 2 drop, PRN  magnesium sulfate, 1 g, q6h PRN  magnesium sulfate, 2 g, q6h PRN  mupirocin, , BID PRN  oxyCODONE, 10 mg, q4h PRN  oxyCODONE, 5 mg, q4h PRN  oxygen, , Continuous PRN - O2/gases  potassium chloride, 40 mEq, q6h PRN  sodium chloride, 1 spray, 4x daily PRN        PHYSICAL  "EXAM:   Visit Vitals  /82   Pulse (!) 150   Temp 36.4 °C (97.5 °F) (Temporal)   Resp 13   Ht 1.549 m (5' 0.98\")   Wt 91.3 kg (201 lb 4.5 oz)   SpO2 99%   BMI 38.05 kg/m²   OB Status Hysterectomy   Smoking Status Never   BSA 1.98 m²     Wt Readings from Last 5 Encounters:   02/27/24 91.3 kg (201 lb 4.5 oz)   12/07/23 92.1 kg (203 lb)   12/01/23 93 kg (205 lb)   11/29/23 92.9 kg (204 lb 12.8 oz)   11/09/23 91.3 kg (201 lb 3.2 oz)     INTAKE/OUTPUT:  I/O last 3 completed shifts:  In: 3630.3 (39.8 mL/kg) [P.O.:240; I.V.:1423.3 (15.6 mL/kg); Blood:972; NG/GT:195; IV Piggyback:800]  Out: 1920 (21 mL/kg) [Urine:1285 (0.4 mL/kg/hr); Emesis/NG output:10; Drains:25; Blood:600]  Weight: 91.3 kg          Vent settings:  Vent Mode: Pressure support  FiO2 (%):  [40 %] 40 %  S RR:  [16] 16  S VT:  [380 mL] 380 mL  PEEP/CPAP (cm H2O):  [5 cm H20-8 cm H20] 8 cm H20  TN SUP:  [8 cm H20-10 cm H20] 8 cm H20  MAP (cm H2O):  [12] 12    Physical Exam:   - CONSTITUTION: Young appearing female with impella 5.5  - NEUROLOGIC: intact.  Alert, following all commands.   - CARDIOVASCULAR: R axillary impella, no bleeding at site.  P9, 5.2L flow. Sinus tachycardia.  - RESPIRATORY: clear on 2L NC.  No secretions  - GI: soft, obese. Hypoactive BS  - : Singh with clear yellow urine  - EXTREMITIES: R fem site from IABP without hematoma or bleeding.  Palpable pulses throughout  - SKIN: intact  - PSYCHIATRIC: calm    Daily Risk Screen  Central line: plan for PICC and dc RIJ  Singh: dc    Images:     Invasive Hemodynamics:    Most Recent Range Past 24hrs   BP (Art) 89/77 Arterial Line BP 1  Min: 89/77  Max: 126/93   MAP(Art) 82 mmHg Arterial Line MAP 1 (mmHg)   Min: 81 mmHg  Max: 103 mmHg   RA/CVP   No data recorded   PA 27/19 PAP  Min: 23/14  Max: 28/19   PA(mean) 22 mmHg PAP (Mean)  Min: 19 mmHg  Max: 23 mmHg   CO 4.9 L/min No data recorded   CI 2.5 L/min/m2 No data recorded   Mixed Venous 63 % No data recorded   SVR  1217 (dyne*sec)/cm5 No " data recorded         Assessment/Plan   33 year old female with past medical history of heart transplant in March 2022 with postoperative course complicated by upper extremity/internal jugular DVTs, and 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 She was admitted to HFICU on 2/15 with concern for cardiogenic shock secondary to allograft rejection and decompensated heart failure with multiorgan dysfunction including significant elevation of liver enzymes and nonoliguric acute kidney injury. Endomyocardial biopsy on 2/16 revealed mild ACR with +CD4s and negative HLAs, however multisystem organ failure persisted with increased inotropic requirements. IABP placed on 2/18. Transferred to CTICU on 2/19 for VA ECMO cannulation with Dr. Marina for persistent multi-organ system dysfunction. Intubated 2/21 for respiratory failure 2/2 pulmonary edema, extubated 2/24. ECMO de-cannulated 2/29/24 but had worsening HIRAM and overall declined clinical status and IABP was transitioned to R axillary impella 5.5 3/1.      Plan:  NEURO: No history. Neuro intact with minima pain. Previous insomnia improved -->  - Serial neuro and pain assessments  - Continue scheduled Tylenol 975 mg q6  - Continue oxy 5mg PRN. Dc oxy 10mg and dilaudid  - PT/OT Consult  - Continue scheduled melatonin 10 mg nightly and Trazodone 50 mg PRN for insomnia  - CAM ICU score qshift. Sleep/wake cycle hygiene     ENT: Pt with signficant epistaxisis overnight 2/28 requiring ENT consult ~ R anterior nare packed with surgicel with resolution. Completed afrin x3 days.  Small nose bleed again today.   - [place] spray 5x day x10 days (completes 3/9)  - prn afrin for nose bleeding  - prn ocean spray and mupiricin for dry nasal passages     CV: Patient has a history of heart transplant in March of 2022 with TTE on 11/29 with LVEF 60-65%. Admitted for cardiogenic shock with concern for acute cellular rejection s/p IABP placement (R fem) 2/18 and VA ECMO (left fem) 2/19. S/p ECMO  decannulation 2/29 with subsequent decline now s/p impella 5.5 and IABP removal 3/1. ECHO 3/1 with EF 30-35% with persisting RV dysfunction.  Currently on milrinone 0.25 mcg/kg/min with impella at P9/5.2L with stable end organ perfusion.  Still trending hypertensive. Tachycardia persisting, unchanged despite repeated changes in therapies.  -->  - Maintain goal MAP 70-90  - Continue full impella support  - continue milrinone  - Increase PO hydralazine to 75mg q8h and PRN hydral 10 mg for MAP >90  - start isosorbide 10mg TID, up titrate as indicated  - Continue home rosuvastatin 40mg daily  - Continue ASA 81 mg daily  - Hold home amlodipine     PULM: No history. Acute respiratory failure now resolved, intubated 2/21-2/24. Had been on RA. Reintubated for OR 3/1 and extubated 3/2.  Currently on 2LNC with robust oxygenation.  -->  - Daily CXR  - Maintain SPO2 > 92%  - dc NC     GI: History of gastric bypass and MMF colitis.  Shock liver resolved.  Constipated with stool burden on KUB yesterday.  Tolerating oral diet. -->  - Continue home PPI, calcitriol 0.5mg daily, multivitamin, calcium carbonate 1,250mg BID  - continue diet  - avoid dobhoff with previous GI bleeding  - Continue miralax BID & senna-colace BID.  Giving lactulose and as needed suppository     : No history, baseline Cr 1.2-1.3.  HIRAM previously requiring CVVH, likely in setting of cardiorenal physiology. Renal US from 2/19 unremarkable.  Remains off CVVH and making urine. HIRAM uptrended off ECMO but now stable on impella. Euvolemic on exam. -->  - RFP as clinically indicated, replete electrolytes per CTICU protocol  - Holding off on diuresis unless trending significantly positive or filling pressures begin to show FVO  - dc dioni HUDSON: History of hypothyroidism TSH 12.02, free thyroxine 2.19 (Endo previously consulted but now signed off). A1c: 6.3. Acceptable glycemic control on SSI. -->  - Maintain BG <180 with hypoglycemia protocol  - continue SSI  -  Continue Synthroid 200mcg daily  - Follow up with primary Endo as outpatient for thyroid monitoring, per inpatient Endo recs      HEME: History of remote DVT. PF4 2/21 negative. Acute on chronic iron deficiency anemia. Acute blood loss anemia with repeated transfusions improved off systemic AC, stable currently. Plts overall stable with no active bleeding. -->    - CBC as clinically indicated  - Continue daily ferrous sulfate  - SQH only per discussion with Dr. Viramontes  - bicarb purge for impella  - SCDs and for DVT prophylaxis  - DVT scan of extremities for surveillance   - Last type and screen: 3/2     Rejection/prophylaxis: S/p heart transplant 3/31/2022. Induction basiliximab. Donor HCV -, toxo -/-, CMV -/+. Mild ACR with +CD4 and negative HLAs however clinical presentation concern for AMR rejection.  PLEX/IVIG 2/17, 2/20. Thymo 2/18, 2/19. Repeat biopsy 2/20 with no evidence of on going rejection (ACR 0R, pAMR0 and no C3D or C4D).  - Continue prednisone 10mg daily  - Continue tacrolimus 4 mg BID with goal level 3-5  - Continue sirolimus 2.5 mg daily with goal level 7-9  - Plan for no further PLEX/IVIG or thymo  - Continue Nystatin suspension 500,000 units q6hr  - Hold bactrim in setting of thrombocytopenia per Transplant  - continue valcyte 450mg q48hrs per transplant team for viremia  - Biopsy planned for 3/6 - will need to be NPO at 0000     ID: Received Zosyn and Vancomycin on 2/15 in ED. Blood cultures from 2/15 negative. Mild leukocytosis and afebrile. -->  - Trend temp q4h     Skin: No active skin issues.   - Preventative Mepilex dressings in place on sacrum and heels  - Change preventative Mepilex weekly or more frequently as indicated (when moist/soiled)   - Every shift skin assessment per nursing and weekly ICU skin rounds  - Moisture barrier to be applied with christopher care  - Active skin problems addressed with nursing on daily rounds     Proph:  SCDs  SQH  Home PPI     G: Lines  Right radial arterial  line placed 2/16 --> dc  Left radial arterial line 3/1 --> dc  RIJ introducer  placed 2/16--> plan to dc and place PICC for milrinone     Code status: Full Code      Restraints: none      I personally spent 60 minutes of critical care time directly and personally managing the patient exclusive of separately billable procedures.     A,B,C,D,E,F,G: reviewed     Dispo: CTICU care for now. Possible transfer to HFICU this week.    CTICU TEAM PHONE [phone]

## 2024-03-04 PROBLEM — T86.21: Status: ACTIVE | Noted: 2024-01-01

## 2024-03-04 NOTE — PROGRESS NOTES
CTICU Progress Note  Charla Bowles/[id number]    Admit Date: 2/15/2024  Hospital Length of Stay: 18   ICU Length of Stay: 13d 15h   CT SURGEON:     SUBJECTIVE:   Remains tachy  Oliguric with worsening renal function    MEDICATIONS  Infusions:  lactated Ringer's, Last Rate: 5 mL/hr (03/04/24 0300)  lactated Ringer's, Last Rate: Stopped (03/03/24 1200)  milrinone, Last Rate: 0.25 mcg/kg/min (03/04/24 0606)  sodium bicarbonate infusion Impella Purge 25 mEq/1000 mL D5W      Scheduled:  acetaminophen, 975 mg, q6h  [Held by provider] aspirin, 81 mg, Daily  calcitriol, 0.5 mcg, Daily  cephalexin, 500 mg, q8h TRICIA  ferrous sulfate (325 mg ferrous sulfate), 65 mg of iron, Daily with breakfast  heparin (porcine), 5,000 Units, q8h  hydrALAZINE, 100 mg, q8h  insulin lispro, 0-10 Units, TID with meals  isosorbide dinitrate, 10 mg, TID  lactulose, 20 g, Once  levothyroxine, 200 mcg, Daily  lidocaine, 5 mL, Once  lidocaine, 1 patch, Daily  melatonin, 10 mg, Nightly  multivitamin with minerals, 1 tablet, Daily  nystatin, 5 mL, q6h  pantoprazole, 40 mg, Daily before breakfast  perflutren lipid microspheres, 0.5-10 mL of dilution, Once in imaging  perflutren protein A microsphere, 0.5 mL, Once in imaging  polyethylene glycol, 17 g, BID  predniSONE, 10 mg, Daily  rosuvastatin, 40 mg, Nightly  sennosides-docusate sodium, 2 tablet, Daily  sirolimus, 2.5 mg, Daily  sodium chloride, 5 spray, 4x daily  [Held by provider] sulfamethoxazole-trimethoprim, 80 mg of trimethoprim, Daily  sulfur hexafluoride microsphr, 2 mL, Once in imaging  tacrolimus, 3.5 mg, q12h TRICIA  traZODone, 50 mg, Nightly  valGANciclovir, 450 mg, q48h  valGANciclovir, 450 mg, q48h      PRN:  bisacodyl, 10 mg, Daily PRN  calcium gluconate, 1 g, q6h PRN  calcium gluconate, 2 g, q6h PRN  dextrose, 25 g, q15 min PRN  dextrose, 25 g, q15 min PRN   Or  glucagon, 1 mg, q15 min PRN  glucagon, 1 mg, q15 min PRN  hydrALAZINE, 20 mg, q4h PRN  artificial tears, 2 drop,  "PRN  magnesium sulfate, 1 g, q6h PRN  magnesium sulfate, 2 g, q6h PRN  microfibrllar collagen, , PRN  mupirocin, , BID PRN  oxyCODONE, 5 mg, q4h PRN  oxymetazoline, 2 spray, q12h PRN  potassium chloride, 40 mEq, q6h PRN  sodium chloride, 1 spray, 4x daily PRN        PHYSICAL EXAM:   Visit Vitals  /80 Comment: After turn.   Pulse (!) 143   Temp 36.5 °C (97.7 °F) (Temporal)   Resp (!) 32   Ht 1.549 m (5' 0.98\")   Wt 90.6 kg (199 lb 11.8 oz)   SpO2 98%   BMI 37.76 kg/m²   OB Status Hysterectomy   Smoking Status Never   BSA 1.97 m²     Wt Readings from Last 5 Encounters:   03/04/24 90.6 kg (199 lb 11.8 oz)   12/07/23 92.1 kg (203 lb)   12/01/23 93 kg (205 lb)   11/29/23 92.9 kg (204 lb 12.8 oz)   11/09/23 91.3 kg (201 lb 3.2 oz)     INTAKE/OUTPUT:  I/O last 3 completed shifts:  In: 2127.8 (23.5 mL/kg) [P.O.:1470; I.V.:657.8 (7.3 mL/kg)]  Out: 750 (8.3 mL/kg) [Urine:720 (0.2 mL/kg/hr); Drains:30]  Weight: 90.6 kg        Physical Exam:   - CONSTITUTION: Young appearing female with impella 5.5  - NEUROLOGIC: intact.  Alert, following all commands.   - CARDIOVASCULAR: R axillary impella, no bleeding at site.  P9, 5.2L flow. Sinus tachycardia.  - RESPIRATORY: clear on 2L NC.  No secretions  - GI: soft, obese. Hypoactive BS  - : oliguric but no wheatley  - EXTREMITIES: R fem site from IABP without hematoma or bleeding.  Palpable pulses throughout  - SKIN: intact  - PSYCHIATRIC: calm    Daily Risk Screen  Central line: RIJ cvc, Maintain for inotropic support    Images: CXR w/ mild pulmonary congestion    Invasive Hemodynamics:    Most Recent Range Past 24hrs   BP (Art) 133/102 Arterial Line BP 1  Min: 108/97  Max: 133/102   MAP(Art) 110 mmHg Arterial Line MAP 1 (mmHg)   Min: 92 mmHg  Max: 110 mmHg   RA/CVP   No data recorded   PA 27/19 No data recorded   PA(mean) 22 mmHg No data recorded   CO 4.9 L/min No data recorded   CI 2.5 L/min/m2 No data recorded   Mixed Venous 63 % No data recorded   SVR  1217 (dyne*sec)/cm5 No " data recorded         Assessment/Plan   33 year old female with past medical history of heart transplant in March 2022 with postoperative course complicated by upper extremity/internal jugular DVTs, and 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 She was admitted to HFICU on 2/15 with concern for cardiogenic shock secondary to allograft rejection and decompensated heart failure with multiorgan dysfunction including significant elevation of liver enzymes and nonoliguric acute kidney injury. Endomyocardial biopsy on 2/16 revealed mild ACR with +CD4s and negative HLAs, however multisystem organ failure persisted with increased inotropic requirements. IABP placed on 2/18. Transferred to CTICU on 2/19 for [facility]O cannulation with Dr. Marina for persistent multi-organ system dysfunction. Intubated 2/21 for respiratory failure 2/2 pulmonary edema, extubated 2/24. ECMO de-cannulated 2/29/24 but had worsening HIRAM and overall declined clinical status and IABP was transitioned to R axillary impella 5.5 3/1.      Plan:  NEURO: No history. Neuro intact with minima pain. Previous insomnia improved -->  - Serial neuro and pain assessments  - PRN Tylenol 975 mg q6  - Continue oxy 5mg PRN.   - PT/OT Consult  - Continue scheduled melatonin 10 mg nightly and Trazodone 50 mg PRN for insomnia  - CAM ICU score qshift. Sleep/wake cycle hygiene     ENT: Pt with signficant epistaxisis overnight 2/28 requiring ENT consult ~ R anterior nare packed with surgicel with resolution. Completed afrin x3 days.  3/3 nose packed by ENT.   - Blair spray 5x day x10 days (completes 3/9)  - Keflex while packing is in  - prn afrin for nose bleeding  - prn ocean spray and mupiricin for dry nasal passages     CV: Patient has a history of heart transplant in March of 2022 with TTE on 11/29 with LVEF 60-65%. Admitted for cardiogenic shock with concern for acute cellular rejection s/p IABP placement (R fem) 2/18 and [facility] ECMO (left fem) 2/19. S/p ECMO decannulation  2/29 with subsequent decline now s/p impella 5.5 and IABP removal 3/1. ECHO 3/1 with EF 30-35% with persisting RV dysfunction.  Currently on milrinone 0.25 mcg/kg/min with impella at P9/4.9L with stable end organ perfusion.  Still trending hypertensive. Tachycardia persisting, unchanged despite repeated changes in therapies.  -->  - Maintain goal MAP 70-90  - Continue full impella support  - continue milrinone  - PO hydralazine to 100mg q8h and PRN hydral 10 mg for MAP >90  - Continue isosorbide 10mg TID, up titrate as indicated  - Continue home rosuvastatin 40mg daily  - Continue ASA 81 mg daily  - Hold home amlodipine     PULM: No history. Acute respiratory failure now resolved, intubated 2/21-2/24. Had been on RA. Reintubated for OR 3/1 and extubated 3/2.  On RA -->  - Daily CXR  - Maintain SPO2 > 92%     GI: History of gastric bypass and MMF colitis.  Shock liver resolved.  Constipated with stool burden on KUB yesterday and no BM recently.  Tolerating oral diet. -->  - Continue home PPI, calcitriol 0.5mg daily, multivitamin, calcium carbonate 1,250mg BID  - continue diet  - Continue miralax BID & senna-colace BID.    - Soap suds enema    : No history, baseline Cr 1.2-1.3.  HIRAM previously requiring CVVH, likely in setting of cardiorenal physiology. Renal US from 2/19 unremarkable.  Remains off CVVH and making urine. HIRAM worsening with worsening hyponatremia. Euvolemic on exam. -->  - RFP as clinically indicated, replete electrolytes per CTICU protocol  - Diurese today for net even- 40 lasix with 1 prbc  - nephrology consult     ENDO: History of hypothyroidism TSH 12.02, free thyroxine 2.19 (Endo previously consulted but now signed off). A1c: 6.3. Acceptable glycemic control on SSI. -->  - Maintain BG <180 with hypoglycemia protocol  - continue SSI  - Continue Synthroid 200mcg daily  - Check TSH in the setting of arrythmia-35. Touch base with endo again     HEME: History of remote DVT. PF4 2/21 negative. Acute  on chronic iron deficiency anemia. Acute blood loss anemia with repeated transfusions improved off systemic AC, downtrend to 7.3 this morning. Plts overall stable with no active bleeding. -->    - CBC as clinically indicated  - Continue daily ferrous sulfate  - 1 unit prbc today  - SQH only per discussion with Dr. Viramontes  - Sodium bicarb purge for impella  - ASA on hold given nose bleed  - SCDs and for DVT prophylaxis  - DVT scan of extremities for surveillance   - Last type and screen: 3/2     Rejection/prophylaxis: S/p heart transplant 3/31/2022. Induction basiliximab. Donor HCV -, toxo -/-, CMV -/+. Mild ACR with +CD4 and negative HLAs however clinical presentation concern for AMR rejection.  PLEX/IVIG 2/17, 2/20. Thymo 2/18, 2/19. Repeat biopsy 2/20 with no evidence of on going rejection (ACR 0R, pAMR0 and no C3D or C4D).  - Continue prednisone 10mg daily  - Continue tacrolimus 4 mg BID with goal level 3-5  - Continue sirolimus 2.5 mg daily with goal level 7-9  - Might receive another PLEX/IVIG or thymo this week per HF  - Continue Nystatin suspension 500,000 units q6hr  - Hold bactrim in setting of thrombocytopenia per Transplant  - continue valcyte 450mg q48hrs per transplant team for viremia  - Biopsy planned for 3/6 - will need to be NPO at 0000     ID: Received Zosyn and Vancomycin on 2/15 in ED. Blood cultures from 2/15 negative. Mild leukocytosis and afebrile. -->  - Trend temp q4h     Skin: No active skin issues.   - Preventative Mepilex dressings in place on sacrum and heels  - Change preventative Mepilex weekly or more frequently as indicated (when moist/soiled)   - Every shift skin assessment per nursing and weekly ICU skin rounds  - Moisture barrier to be applied with christopher care  - Active skin problems addressed with nursing on daily rounds     Proph:  SCDs  SQH  Home PPI     G: Lines  RIJ introducer  placed 2/16--> plan to dc and place PICC for milrinone but hold off for now     Code status: Full  Code      Restraints: none      I personally spent 45 minutes of critical care time directly and personally managing the patient exclusive of separately billable procedures.     A,B,C,D,E,F,G: reviewed     Dispo: CTICU care for now. Possible transfer to HFICU this week.    CTICU TEAM PHONE [phone]

## 2024-03-04 NOTE — PROGRESS NOTES
CTICU Attending Assessment Note    This is a 33 yoF who is ~1 year post-heart transplant presenting with acute rejection in cardiogenic shock who requiring MCS with ECMO but now decannulated continuing to require support with Impella.    Neuro: Awake, alert, oriented x3. CAM negative. Continue PO/topical multimodal pain therapy to adequately control pain. Continue CAM assessment and encourage restful sleep per ICU protocols.  Continue to promote mobility. Continue trazadone and melatonin for sleep promotion.  ENT: Epistaxis with R nare packing in place. Continue to appreciate ENT recommendations.  Continue Keflex while nose packing in place. No signs of ongoing bleeding.  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 Goal MAP 70-90 and CI >2.2. Persistent tachycardia of unclear etiology without signs of malperfusion. May be in part due to milrinone but continues to require inotropy thus cannot begin beta-blockade. EKG with sinus tachycardia without arrhythmia.  Continue afterload reduction with hydralazine 100 mg and isordil with goal MAP 70-90.  Pulm: Currently on RA. Continue aggressive pulmonary toilet and aim for net negative fluid balance to wean O2 requirements.  Renal: Acute renal failure with oliguria overnight and concerning for prerenal etiology. Hyponatremia concerning for volume overload but other signs of euvolemia without significant edema or O2 requirement. Will aim net negative fluid balance. Strict I&O with goal UOP >0.5 mL/kg/hr.  Electrolyte protocol.  GI: Constipation with soft, nontender abdomen but will plan for enema today and continue PO bowel regimen. Continue tolerating diet and will change to renal diet today.  Heme: Acute postoperative blood loss anemia with goal Hgb >7 and platelets >50, but given cardiogenic shock requiring MCS with worsening HIRAM will plan to transfuse 1u pRBC today. No ongoing signs of bleeding today and  will continue to follow as epistaxis resolved with packing.  Endo: Will continue SSI protocol with goal -180 per post-cardiotomy goal. Continue levothyroxine given hx of hypothyroidism.  ID: No other indications for antibiotics at this time. Continue tacrolimus, valacyclovir, prednisone, sirolimus and appreciate HF recommendations regarding immunosuppression. Planning for repeat biopsy in light of negative biopsy but clinically consistent with rejection.  Skin/MSK: Surgical site is C/D/I.  Continue to promote mobility.  Prophy: Continue SCD, SQH, PPI.  A-F Bundle reviewed and addressed as above with multidisciplinary rounds completed at bedside.  Dispo: Continue CTICU care.    Due to the high probability of life threatening clinical decompensation, the patient required critical care time evaluating and managing this patient.  Critical care time included obtaining a history, examining the patient, ordering and reviewing studies, discussing, developing, and implementing a management plan, evaluating the patient's response to treatment, and discussion with other care team providers. I saw and evaluated the patient myself. I reviewed the provider's documentation and discussed the patient with the provider. Critical care time was performed exclusive of billable procedures.    Critical Care Time: 45 minutes    Feliciano Lee MD  Emergency Critical Care Attending Physician

## 2024-03-04 NOTE — PROGRESS NOTES
Occupational Therapy    Re-Evaluation and Treatment    Patient Name: Charla Bowles  MRN: [id number]  Today's Date: 3/4/2024  Time Calculation  Start Time: 1347  Stop Time: 1427  Time Calculation (min): 40 min    Assessment  IP OT Assessment  OT Assessment: Pt is a 33 year old female who demonstrates decreased strength, balance, activity tolerance, coordination, and cognition, which impedes occupational performance.  Prognosis: Good  Barriers to Discharge: Inaccessible home environment  Evaluation/Treatment Tolerance: Patient tolerated treatment well  Medical Staff Made Aware: Yes  End of Session Communication: Bedside nurse  End of Session Patient Position: Up in chair, Alarm off, not on at start of session  Plan:  Treatment Interventions: ADL retraining, Functional transfer training, UE strengthening/ROM, Endurance training, Cognitive reorientation, Patient/family training, Equipment evaluation/education, Neuromuscular reeducation, Compensatory technique education  OT Frequency: 4 times per week  OT Discharge Recommendations: High intensity level of continued care  Equipment Recommended upon Discharge:  (TBD)  OT Recommended Transfer Status: Assist of 1  OT - OK to Discharge: Yes    Subjective   Current Problem:  1. Cardiogenic shock (CMS/HCC)  Renal artery duplex complete    Renal artery duplex complete    Case Request Cath Lab: Right Heart Cath    Case Request Cath Lab: Right Heart Cath    Cardiac catheterization - non-coronary    Cardiac catheterization - non-coronary    Case Request Cath Lab: Left Heart Cath, IABP Insertion    Case Request Cath Lab: Left Heart Cath, IABP Insertion    Cardiac catheterization - non-coronary    Cardiac catheterization - non-coronary    Cardiac catheterization - coronary    Cardiac catheterization - coronary    Case Request Cath Lab: Endomyocardial Biopsy    Case Request Cath Lab: Endomyocardial Biopsy    Cardiac catheterization - non-coronary    Cardiac catheterization -  non-coronary    Intubation    Intubation    Transthoracic Echo (TTE) Limited    Transthoracic Echo (TTE) Limited    Transthoracic Echo (TTE) Limited    Transthoracic Echo (TTE) Limited    Transthoracic Echo (TTE) Limited    Transthoracic Echo (TTE) Limited    Case Request Operating Room: Impella VAD Insertion    Case Request Operating Room: Impella VAD Insertion    Transthoracic Echo (TTE) Limited    Transthoracic Echo (TTE) Limited    Transthoracic Echo (TTE) Limited    Transthoracic Echo (TTE) Limited    Case Request Operating Room: Removal Extracorporeal Membrane Oxygenation    Case Request Operating Room: Removal Extracorporeal Membrane Oxygenation    Transthoracic Echo (TTE) Limited    Transthoracic Echo (TTE) Limited    Transthoracic Echo (TTE) Limited    Transthoracic Echo (TTE) Limited    Case Request Operating Room: Impella VAD Insertion    Case Request Operating Room: Impella VAD Insertion    Transthoracic Echo (TTE) Limited    Transthoracic Echo (TTE) Limited    Case Request Cath Lab: Right Heart Cath    Case Request Cath Lab: Right Heart Cath    Cardiac Catheterization Procedure    Cardiac Catheterization Procedure    CANCELED: Transthoracic Echo (TTE) Limited    CANCELED: Transthoracic Echo (TTE) Limited    CANCELED: Transthoracic Echo (TTE) Limited    CANCELED: Transthoracic Echo (TTE) Limited    CANCELED: Transthoracic Echo (TTE) Limited    CANCELED: Transthoracic Echo (TTE) Limited    CANCELED: Transthoracic Echo (TTE) Limited    CANCELED: Transthoracic Echo (TTE) Limited    CANCELED: Transthoracic Echo (TTE) Limited    CANCELED: Transthoracic Echo (TTE) Limited      2. Acute renal failure, unspecified acute renal failure type (CMS/HCC)  Renal artery duplex complete    Renal artery duplex complete      3. Heart transplant recipient (CMS/HCC)  Transthoracic Echo (TTE) Limited    Transthoracic Echo (TTE) Limited    Transthoracic Echo (TTE) Limited    Transthoracic Echo (TTE) Limited    Case Request  Cath Lab: Endomyocardial Biopsy    Case Request Cath Lab: Endomyocardial Biopsy    Cardiac catheterization - non-coronary    Cardiac catheterization - non-coronary    Transthoracic Echo (TTE) Limited    Transthoracic Echo (TTE) Limited    Transthoracic Echo (TTE) Limited    Transthoracic Echo (TTE) Limited    Case Request Cath Lab: Right Heart Cath    Case Request Cath Lab: Right Heart Cath    Cardiac Catheterization Procedure    Cardiac Catheterization Procedure    CANCELED: Transthoracic Echo (TTE) Limited    CANCELED: Transthoracic Echo (TTE) Limited    CANCELED: Transthoracic Echo (TTE) Limited    CANCELED: Transthoracic Echo (TTE) Limited    CANCELED: Transthoracic Echo (TTE) Limited    CANCELED: Transthoracic Echo (TTE) Limited      4. Encounter for aftercare following heart transplant (CMS/HCC)  Case Request Cath Lab: Right Heart Cath    Case Request Cath Lab: Right Heart Cath    Cardiac catheterization - non-coronary    Cardiac catheterization - non-coronary    Case Request Cath Lab: Left Heart Cath, IABP Insertion    Case Request Cath Lab: Left Heart Cath, IABP Insertion    Cardiac catheterization - non-coronary    Cardiac catheterization - non-coronary    Cardiac catheterization - coronary    Cardiac catheterization - coronary      5. Atherosclerosis of renal artery (CMS/HCC)  Renal artery duplex complete      6. Cardiac transplant rejection (CMS/HCC)  Transthoracic Echo (TTE) Limited    Transthoracic Echo (TTE) Limited    Transthoracic Echo (TTE) Limited    Transthoracic Echo (TTE) Limited    Vascular US lower extremity arterial duplex left    Vascular US lower extremity arterial duplex left    Case Request Cath Lab: Right Heart Cath    Case Request Cath Lab: Right Heart Cath    Cardiac Catheterization Procedure    Cardiac Catheterization Procedure      7. Heart transplant as cause of abnormal reaction or later complication (CMS/HCC)  Transthoracic Echo (TTE) Limited    Transthoracic Echo (TTE) Limited     Case Request Cath Lab: Endomyocardial Biopsy    Case Request Cath Lab: Endomyocardial Biopsy    Cardiac catheterization - non-coronary    Cardiac catheterization - non-coronary      8. 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 Altered tissue perfusion  CANCELED: Lower extremity arterial duplex bilateral    CANCELED: Lower extremity arterial duplex bilateral      10. Peripheral vascular disease, unspecified (CMS/HCC)  Vascular US lower extremity arterial duplex left    Vascular US lower extremity arterial duplex left      11. Chronic clinical systolic heart failure (CMS/HCC)  Anesthesia Intraoperative Transesophageal Echocardiogram    Anesthesia Intraoperative Transesophageal Echocardiogram      12. Left ventricular failure, unspecified (CMS/HCC)  Anesthesia Intraoperative Transesophageal Echocardiogram      13. Other chronic pulmonary embolism with acute cor pulmonale (CMS/HCC)        14. Swelling  Lower extremity venous duplex bilateral    Upper extremity venous duplex bilateral    Lower extremity venous duplex bilateral    Upper extremity venous duplex bilateral      15. Swelling of both lower extremities  Lower extremity venous duplex bilateral    Upper extremity venous duplex bilateral    Lower extremity venous duplex bilateral    Upper extremity venous duplex bilateral        General:  Reason for Referral: admitted to [facility] on 2/15 with concern for cardiogenic shock secondary to allograft rejection and decompensated heart failure with multiorgan dysfunction including significant elevation of liver enzymes and nonoliguric acute kidney injury. Endomyocardial biopsy on 2/16 revealed mild ACR with +CD4s and negative HLAs, however multisystem organ failure persisted with increased inotropic requirements. IABP placed on 2/18. Transferred to [facility] on 2/19  "for [facility]O cannulation with Dr. Marina for persistent multisystem dysfunction. Intubated 2/19-2/24 2/2 pulmonary edema, decannulated 2/29. Re-eval s/p femoral IABP removal and R axillary impella 5.5 placement 3/1  Past Medical History Relevant to Rehab: heart transplant in March 2022 with postoperative course complicated by upper extremity/internal jugular DVTs, and asymptomatic 2R rejection in November 2022   Prior to Session Communication: Bedside nurse  Patient Position Received: Bed, 3 rail up, Alarm off, not on at start of session  Family/Caregiver Present: No  General Comment: Pt supine in bed upon arrival, reporting pain at impella site but motivated for OOB activity. On milrinone, R axillary impella (P-9), flow 5.0. Dressing with light bleeding (RN reports stable) and impella secured to abdomen.     Precautions:  Medical Precautions: Fall precautions, Cardiac precautions  Precautions Comment: MAP 70-90, protective precautions, R axillary impella precautions (no pushing/pulling, no shoulder flexion or abduction >90 degrees)    Vital Signs:  Heart Rate: (!) 143 (Max HR with mobility 140s; Post: 130)  Resp: 22 (post: 21)  SpO2: 99 % (Post: 98)  BP: 120/73 (Post: 124/97)  MAP (mmHg): 88 (post: 107)  BP Location:  (femoral IABP)    Pain:  Pain Assessment  Pain Assessment: 0-10  Pain Score:  (reported intermittent pain at impella site, reporting \"they made me do too much with it and have been pulling on my arm\". Did not quantify pain)  Lines/Tubes/Drains:  Introducer 02/16/24 Internal jugular Right (Active)   Number of days: 17       Ventricular Assist Device Impella 5.5 (Active)   Number of days: 2       Urethral Catheter (Active)   Number of days: 0       Closed/Suction Drain Lateral;Right;Superior Chest Bulb 10 Fr. (Active)   Number of days: 2       External Urinary Catheter Female (Active)   Number of days: 1         Objective   Cognition:  Overall Cognitive Status: Impaired  Orientation Level: Oriented " "X4  Cognition Comments: Touching/tapping at impella site throughout session despite education to refrain. Frequently reporting \"I need my arm wrapped up so I don't use it\" despite frequent education on activity restrictions. Labile throughout session.  Attention: Exceptions to WFL  Alternating Attention: Impaired  Sustained Attention: Impaired  Memory: Exceptions to WFL  Short-Term Memory: Impaired  Safety/Judgement: Exceptions to WFL  Insight: Moderate  Processing Speed: Delayed     Confusion Assessment Method (CAM)  Acute Onset and Fluctuating Course (1A): Yes  Acute Onset and Fluctuating Course (1B): Yes  Inattention (2): No  Disorganized Thinking (3): No  Rate Patient's Level of Consciousness (4): Alert (Normal), No  Delirium Present: No     Home Living:  Type of Home: House  Lives With:  (Mother and 4 children (aged 10, 11, and 17))  Home Adaptive Equipment: None  Home Layout: Two level (bathroom on 2nd floor, plans to stay on first floor with BSC and sponge bathe)  Home Access: Stairs to enter with rails  Entrance Stairs-Number of Steps: 3  Bathroom Shower/Tub:  (plans to sponge bath on 1st floor)  Bathroom Equipment: Bedside commode     Prior Function:  Level of Otho: Independent with ADLs and functional transfers  ADL Assistance: Independent  Homemaking Assistance: Independent  Ambulatory Assistance: Independent  Vocational: On disability  Hand Dominance: Right  Prior Function Comments: +drives     ADL:  Eating Assistance: Independent  Grooming Assistance: Modified independent (Device)  Grooming Deficit:  (in sitting)  Bathing Assistance: Moderate  UE Dressing Assistance: Moderate  UE Dressing Deficit:  (don gown around back)  LE Dressing Assistance: Moderate  LE Dressing Deficit:   Toileting Assistance with Device: Moderate  Toileting Deficit:     Activity Tolerance:  Endurance: Tolerates 10 - 20 min exercise with multiple rests  Early Mobility/Exercise Safety Screen: Proceed with mobilization - No " "exclusion criteria met  Activity Tolerance Comments: Denied fatigue with activity however required sitting rest break with mobility    Balance:  Static Sitting Balance  Static Sitting-Level of Assistance: Close supervision  Static Sitting-Comment/Number of Minutes: intermittent excessive forward lean noted    Bed Mobility/Transfers: Bed Mobility  Bed Mobility: Yes  Bed Mobility 1  Bed Mobility 1: Supine to sitting  Level of Assistance 1: Minimum assistance  Bed Mobility Comments 1: increased time, HOB elevated   and Transfers  Transfer: Yes  Transfer 1  Transfer From 1: Sit to  Transfer to 1: Stand  Transfer Device 1: Gait belt, Walker  Transfer Level of Assistance 1: Minimum assistance  Trials/Comments 1: x3 trials, cues for safety, increased time      Vision: Vision - Basic Assessment  Current Vision: No visual deficits       Sensation:  Light Touch:  (reports new sensation deficits to RUE are \"starting\" but unable to elaborate)  Sensation Comment: WFL    Strength:  Strength Comments: LUE WFL    Perception:  Perseveration: Perseverates during conversation    Coordination:  Movements are Fluid and Coordinated: No  Lower Body Coordination: Impaired  Coordination Comment: Decreased BLE coordination with all functional tasks     Hand Function:  Hand Function  Gross Grasp: Functional  Coordination: Functional    Extremities: RUE   RUE :  (ROM WFL within precautions, strength >/=3/5. Resistance not applied 2/2 precautions)          Treatment Completed on Evaluation  Therapy/Activity:     Therapeutic Activity  Therapeutic Activity Performed: Yes  Therapeutic Activity 1: Pt ambulated <20 feet with seated rest break and min-mod A x1 with FWW. Cues for safety required throughout.     Outcome Measures: [facility] Daily Activity  Putting on and taking off regular lower body clothing: A lot  Bathing (including washing, rinsing, drying): A lot  Putting on and taking off regular upper body clothing: A lot  Toileting, which includes " using toilet, bedpan or urinal: A lot  Taking care of personal grooming such as brushing teeth: None  Eating Meals: None  Daily Activity - Total Score: 16        ICU Mobility Screen  Early Mobility/Exercise Safety Screen: Proceed with mobilization - No exclusion criteria met,          Education Documentation  Body Mechanics, taught by Marcia Thomason OT at 3/4/2024  4:02 PM.  Learner: Patient  Readiness: Acceptance  Method: Explanation  Response: Needs Reinforcement    Precautions, taught by Marcia Thomason OT at 3/4/2024  4:02 PM.  Learner: Patient  Readiness: Acceptance  Method: Explanation  Response: Needs Reinforcement    ADL Training, taught by Marcia Thomason OT at 3/4/2024  4:02 PM.  Learner: Patient  Readiness: Acceptance  Method: Explanation  Response: Needs Reinforcement    Education Comments  No comments found.        Goals:   Encounter Problems       Encounter Problems (Active)       ADLs       Patient will complete daily grooming tasks in standing with LRAD with supervision level of assistance and PRN adaptive equipment.       Start:  03/01/24    Expected End:  03/18/24               ADLs       Patient with complete lower body dressing with stand by assist level of assistance donning and doffing all LE clothes  with PRN adaptive equipment       Start:  03/04/24    Expected End:  03/18/24            Patient will complete toileting including hygiene clothing management/hygiene with stand by assist level of assistance.       Start:  03/04/24    Expected End:  03/18/24               BALANCE       Pt will increase dynamic standing tolerance to >10 min with supervision using LRAD during functional mobility/ADLs without LOB in order to improve activity tolerance and balance for self-care tasks.        Start:  03/01/24    Expected End:  03/18/24               COGNITION/SAFETY       Patient will participate in cognitive activities to demonstrate WFL score on further cognitive  assessments, including Medi-Cog, MoCA and remain A&O x3, CAM (-).        Start:  03/01/24    Expected End:  03/18/24               EXERCISE/STRENGTHENING       Patient will be educated on BUE HEP for increased ADL/IADL performance.       Start:  03/01/24    Expected End:  03/18/24               MOBILITY       Patient will perform Functional mobility max Household distances/Community Distances with supervision level of assistance and least restrictive device in order to improve safety and functional mobility.       Start:  03/01/24    Expected End:  03/18/24 03/04/24 at 4:03 PM   Marcia Thomason, OT   Rehab Office: [phone]

## 2024-03-04 NOTE — SIGNIFICANT EVENT
"ENT reengaged for left anterior epistaxis.  See previous significant event note, placed Floseal this afternoon which did not help with the bleeding.    Procedure note: Control of epistaxis  Verbal consent was obtained from patient.  Suction was used to suction out the right and left nasal cavity and oral cavity.  Bleeding was noted from the left anterior septum.  Surgicel was then wrapped around a trimmed Merocel which was then placed in the nose on the right side.  Afrin was then sprayed around the Merocel.  Patient tolerated procedure well.      Assessment  Charla Bowles is a 33 y.o. female who presented to [facility] on [facility]/15/[facility]24. ENT consulted for epistaxis and status post placement of nonabsorbable Merocel packing in the left nasal cavity.     Recommendations:  - Can use oxymetazoline (Afrin) 2 sprays each nostril every 8 hours for the next two days (three days total) if further bleeding or oozing occurs. Do not use for more than 3 days total. This can be applied even with packing in the nares.  - If further bleeding occurs, use copious oxymetazoline spray for epistaxis and pinch the nose (\"hold pressure\") for 15 minutes without letting go. If this does not control bleeding then gently blow out clot and repeat 2 more times.  -Nasal saline or Ayr nasal gel multiple times a day for 10 days - please apply even with packing in the nares  -BP control per primary team  -Avoid digital trauma or sticking objects into nose  -Humidifier at night, avoid heaters directly blowing in face  -Keflex (or other abx covering S. aureus) while packing is in, please provide prescritpion if discharged  -If O2 needed, use humidified air. Recommend humidifier for home use  -Packing will remain for 3-5 days, ENT will remove      Note not final until signed by an attending.     Giuseppe Lagos, PGY2  I am the day resident. I can only be contacted from 6am to 5pm. Page on weekends or off hours.   Otolaryngology - Head & Neck Surgery  ENT " Consult pager: [phone]  Peds pager: [phone]  Adult Head & Neck Phone: [phone]  ENT subspecialty team: Manan individual resident who wrote today's note  Please page if urgent
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
## 2024-03-04 NOTE — PROGRESS NOTES
"Charla Bowles is a 33 y.o. female on day 18 of admission presenting with Cardiogenic shock (CMS/HCC).    Subjective:L  280 ml of urine charted yesterday. Impella in place, got 40 mg IV lasix with RBC transfusion with 700 ml of urine OP. Currently on room air. On milrinone. Having nose bleeding this morning.   Off CVVH since 2/27/24     Last Recorded Vitals  Blood pressure 116/66, pulse (!) 143, temperature 36.5 °C (97.7 °F), temperature source Temporal, resp. rate (!) 36, height 1.549 m (5' 0.98\"), weight 90.6 kg (199 lb 11.8 oz), SpO2 96 %.  Intake/Output last 3 Shifts:  I/O last 3 completed shifts:  In: 2127.8 (23.5 mL/kg) [P.O.:1470; I.V.:657.8 (7.3 mL/kg)]  Out: 750 (8.3 mL/kg) [Urine:720 (0.2 mL/kg/hr); Drains:30]  Weight: 90.6 kg     General: No distress   CVS: S1 S2 no murmurs  RESP:  Lungs CTAB on RA  ABDO: Soft, non-tender   Neuro: A + O x 3  Skin: No rash   Extremities: No edema   Impella in place  Left internal jugular trialysis    Labs:  Results for orders placed or performed during the hospital encounter of 02/15/24 (from the past 24 hour(s))   POCT GLUCOSE   Result Value Ref Range    POCT Glucose 229 (H) 74 - 99 mg/dL   Renal Function Panel   Result Value Ref Range    Glucose 242 (H) 74 - 99 mg/dL    Sodium 129 (L) 136 - 145 mmol/L    Potassium 4.1 3.5 - 5.3 mmol/L    Chloride 92 (L) 98 - 107 mmol/L    Bicarbonate 20 (L) 21 - 32 mmol/L    Anion Gap 21 (H) 10 - 20 mmol/L    Urea Nitrogen 93 (HH) 6 - 23 mg/dL    Creatinine 3.88 (H) 0.50 - 1.05 mg/dL    eGFR 15 (L) >60 mL/min/1.73m*2    Calcium 9.6 8.6 - 10.6 mg/dL    Phosphorus 4.7 2.5 - 4.9 mg/dL    Albumin 4.5 3.4 - 5.0 g/dL   Magnesium   Result Value Ref Range    Magnesium 2.49 (H) 1.60 - 2.40 mg/dL   CBC   Result Value Ref Range    WBC 17.8 (H) 4.4 - 11.3 x10*3/uL    nRBC 0.1 (H) 0.0 - 0.0 /100 WBCs    RBC 2.69 (L) 4.00 - 5.20 x10*6/uL    Hemoglobin 8.0 (L) 12.0 - 16.0 g/dL    Hematocrit 23.1 (L) 36.0 - 46.0 %    MCV 86 80 - 100 fL    MCH " 29.7 26.0 - 34.0 pg    MCHC 34.6 32.0 - 36.0 g/dL    RDW 14.7 (H) 11.5 - 14.5 %    Platelets 116 (L) 150 - 450 x10*3/uL   Coagulation Screen   Result Value Ref Range    Protime 12.2 9.8 - 12.8 seconds    INR 1.1 0.9 - 1.1    aPTT 27 27 - 38 seconds   Tacrolimus level   Result Value Ref Range    Tacrolimus  7.4 <=15.0 ng/mL   Sirolimus level   Result Value Ref Range    Sirolimus  5.3 4.0 - 20.0 ng/mL   Lactate Dehydrogenase   Result Value Ref Range     (H) 84 - 246 U/L   Hepatic function panel   Result Value Ref Range    Albumin 4.3 3.4 - 5.0 g/dL    Bilirubin, Total 0.8 0.0 - 1.2 mg/dL    Bilirubin, Direct 0.3 0.0 - 0.3 mg/dL    Alkaline Phosphatase 40 33 - 110 U/L    ALT 5 (L) 7 - 45 U/L    AST 32 9 - 39 U/L    Total Protein 6.1 (L) 6.4 - 8.2 g/dL   Calcium, Ionized   Result Value Ref Range    POCT Calcium, Ionized 1.18 1.1 - 1.33 mmol/L   CBC   Result Value Ref Range    WBC 13.8 (H) 4.4 - 11.3 x10*3/uL    nRBC 0.2 (H) 0.0 - 0.0 /100 WBCs    RBC 2.55 (L) 4.00 - 5.20 x10*6/uL    Hemoglobin 7.3 (L) 12.0 - 16.0 g/dL    Hematocrit 22.2 (L) 36.0 - 46.0 %    MCV 87 80 - 100 fL    MCH 28.6 26.0 - 34.0 pg    MCHC 32.9 32.0 - 36.0 g/dL    RDW 14.8 (H) 11.5 - 14.5 %    Platelets 110 (L) 150 - 450 x10*3/uL   Magnesium   Result Value Ref Range    Magnesium 2.40 1.60 - 2.40 mg/dL   Basic Metabolic Panel   Result Value Ref Range    Glucose 173 (H) 74 - 99 mg/dL    Sodium 129 (L) 136 - 145 mmol/L    Potassium 4.2 3.5 - 5.3 mmol/L    Chloride 92 (L) 98 - 107 mmol/L    Bicarbonate 21 21 - 32 mmol/L    Anion Gap 20 10 - 20 mmol/L    Urea Nitrogen 99 (HH) 6 - 23 mg/dL    Creatinine 4.15 (H) 0.50 - 1.05 mg/dL    eGFR 14 (L) >60 mL/min/1.73m*2    Calcium 9.4 8.6 - 10.6 mg/dL   Phosphorus   Result Value Ref Range    Phosphorus 4.9 2.5 - 4.9 mg/dL   TSH   Result Value Ref Range    Thyroid Stimulating Hormone 35.92 (H) 0.44 - 3.98 mIU/L   POCT GLUCOSE   Result Value Ref Range    POCT Glucose 149 (H) 74 - 99 mg/dL   Prepare RBC: 1  Units   Result Value Ref Range    PRODUCT CODE V3722H13     Unit Number [id number]     Unit ABO O     Unit RH POS     XM INTEP COMP     Dispense Status TR     Blood Expiration Date April 01, 2024 23:59 EDT     PRODUCT BLOOD TYPE 5100     UNIT VOLUME 350    Blood Gas Venous Full Panel   Result Value Ref Range    POCT pH, Venous 7.37 7.33 - 7.43 pH    POCT pCO2, Venous 33 (L) 41 - 51 mm Hg    POCT pO2, Venous 45 35 - 45 mm Hg    POCT SO2, Venous 73 45 - 75 %    POCT Oxy Hemoglobin, Venous 71.0 45.0 - 75.0 %    POCT Hematocrit Calculated, Venous 22.0 (L) 36.0 - 46.0 %    POCT Sodium, Venous 123 (L) 136 - 145 mmol/L    POCT Potassium, Venous 4.3 3.5 - 5.3 mmol/L    POCT Chloride, Venous 92 (L) 98 - 107 mmol/L    POCT Ionized Calicum, Venous 1.14 1.10 - 1.33 mmol/L    POCT Glucose, Venous 334 (H) 74 - 99 mg/dL    POCT Lactate, Venous 2.6 (H) 0.4 - 2.0 mmol/L    POCT Base Excess, Venous -5.6 (L) -2.0 - 3.0 mmol/L    POCT HCO3 Calculated, Venous 19.1 (L) 22.0 - 26.0 mmol/L    POCT Hemoglobin, Venous 7.2 (L) 12.0 - 16.0 g/dL    POCT Anion Gap, Venous 16.0 10.0 - 25.0 mmol/L    Patient Temperature 37.0 degrees Celsius    FiO2 21 %   Transthoracic Echo (TTE) Limited   Result Value Ref Range    AV pk reji 1.75 m/s    LV biplane EF 34 %    RV free wall pk S' 7.62 cm/s    LVIDd 4.60 cm    RVSP 35.9 mmHg    AV pk grad 12.3 mmHg    LV A4C EF 25.7    T3, free   Result Value Ref Range    Triiodothyronine, Free 1.1 (L) 2.3 - 4.2 pg/mL   Magnesium   Result Value Ref Range    Magnesium 2.14 1.60 - 2.40 mg/dL   CBC   Result Value Ref Range    WBC 15.2 (H) 4.4 - 11.3 x10*3/uL    nRBC 0.0 0.0 - 0.0 /100 WBCs    RBC 3.01 (L) 4.00 - 5.20 x10*6/uL    Hemoglobin 8.9 (L) 12.0 - 16.0 g/dL    Hematocrit 24.6 (L) 36.0 - 46.0 %    MCV 82 80 - 100 fL    MCH 29.6 26.0 - 34.0 pg    MCHC 36.2 (H) 32.0 - 36.0 g/dL    RDW 14.2 11.5 - 14.5 %    Platelets 119 (L) 150 - 450 x10*3/uL   Calcium, Ionized   Result Value Ref Range    POCT Calcium, Ionized  "1.09 (L) 1.1 - 1.33 mmol/L   Renal function panel   Result Value Ref Range    Glucose 777 (HH) 74 - 99 mg/dL    Sodium 112 (LL) 136 - 145 mmol/L    Potassium 3.8 3.5 - 5.3 mmol/L    Chloride 80 (L) 98 - 107 mmol/L    Bicarbonate 18 (L) 21 - 32 mmol/L    Anion Gap 18 mmol/L    Urea Nitrogen 90 (H) 6 - 23 mg/dL    Creatinine 3.81 (H) 0.50 - 1.05 mg/dL    eGFR 15 (L) >60 mL/min/1.73m*2    Calcium 8.3 (L) 8.6 - 10.6 mg/dL    Phosphorus 4.5 2.5 - 4.9 mg/dL    Albumin 3.8 3.4 - 5.0 g/dL   Coagulation Screen   Result Value Ref Range    Protime 13.5 (H) 9.8 - 12.8 seconds    INR 1.2 (H) 0.9 - 1.1    aPTT 26 (L) 27 - 38 seconds   BLOOD GAS VENOUS FULL PANEL   Result Value Ref Range    POCT pH, Venous 7.37 7.33 - 7.43 pH    POCT pCO2, Venous 34 (L) 41 - 51 mm Hg    POCT pO2, Venous 40 35 - 45 mm Hg    POCT SO2, Venous 65 45 - 75 %    POCT Oxy Hemoglobin, Venous 63.2 45.0 - 75.0 %    POCT Hematocrit Calculated, Venous 29.0 (L) 36.0 - 46.0 %    POCT Sodium, Venous 125 (L) 136 - 145 mmol/L    POCT Potassium, Venous 4.5 3.5 - 5.3 mmol/L    POCT Chloride, Venous 93 (L) 98 - 107 mmol/L    POCT Ionized Calicum, Venous 1.15 1.10 - 1.33 mmol/L    POCT Glucose, Venous 168 (H) 74 - 99 mg/dL    POCT Lactate, Venous 1.4 0.4 - 2.0 mmol/L    POCT Base Excess, Venous -5.0 (L) -2.0 - 3.0 mmol/L    POCT HCO3 Calculated, Venous 19.7 (L) 22.0 - 26.0 mmol/L    POCT Hemoglobin, Venous 9.5 (L) 12.0 - 16.0 g/dL    POCT Anion Gap, Venous 17.0 10.0 - 25.0 mmol/L    Patient Temperature 37.0 degrees Celsius    FiO2 21 %   POCT GLUCOSE   Result Value Ref Range    POCT Glucose 175 (H) 74 - 99 mg/dL     *Note: Due to a large number of results and/or encounters for the requested time period, some results have not been displayed. A complete set of results can be found in Results Review.         Assessment/Plan     Charla MONTELONGO Yimi Bowles is a 33 y.o. with a history of orthotic heart transplant as \"March 2022 with postoperative course complicated by upper " extremity DVT, asymptomatic 2R rejection in November 2022 who currently got admitted with nausea vomiting and markedly reduced ejection fraction 35%, low cardiac index with elevated filling pressures concerning for cardiogenic shock.       HIRAM on CKD stage 3: (baseline Cr 1.2)   -HIRAM in the setting of CRS, cardiogenic shock.  Started on CRRT on 2/19/2024 for volume management. Discontinued 2/27.  Clearance remains impaired, has left internal jugular trialysis  - nonoliguric, 700 ml of urine so far today with IV lasix 40 mg  - volume status: euvolemic to mild hypervolemic, mildly elevated RVSP to 36 on TTE  - wheatley placed today, BS was 350 ml.     Plan:  - discussed with team, will hold off on KRT today, no strong indications  - continue diuresis Prn with goal to maintain euvolemia  -  RFP from late this morning (11am) likely inaccurate, agree with recheck    # anemia:   - on iron tabs, iron sat of 13% 2/16/2024.  - could also supplement iron IV given no concern for ongoing infection    #BMD:   - calcium and phos WNL     # Immunosuppression:   As per the heart transplant team     # electrolytes  - WNL     Cardiogenic shock  -S/p endomyocardial biopsies on 2/16 and 2/20 without definitive findings but pt treated for presumed rejection.  DSA negative so far. S/p pulse methylprednisolone 1 g for 3 days from 2/16- 2/18, Thymoglobulin -2 doses given on 2/18 and 2/19, IV immunoglobulin plus PLEX sessions done on 2/18 and 2/20.  - planned for endometrial biopsy 3/6  -s/p [facility] ECMO and on IABP support till 3/1/24. Currently with Impella 3/1/24.    Kuldip Hernandes MD  Nephrology fellow

## 2024-03-04 NOTE — PROGRESS NOTES
[facility]  ADVANCED HEART FAILURE AND TRANSPLANT CARDIOLOGY     Charla Bowles is a 33 y.o. female on day 12 of admission presenting with Cardiogenic shock (CMS/HCC).    INTERVAL EVENTS / PERTINENT ROS:    Patient had episode of nasal bleeding requiring nasal packing. She remains on milrinone @ 0.25 mcg + Impella 5.5 P-9. MAP is maintained 70-95. Net input/output +1.1 liter. Total urine output 280 ml in the past 24 hours. Telemetry monitor showed sinus tachycardia at 140s with 3 beats NSVT.     Objective     Physical Exam  Constitutional:       General: She is not in acute distress.     Appearance: She is obese.   HENT:      Head: Normocephalic.      Comments: +Rushford-Estevan catheter on right internal jugular, Trialysis line on left IJ     Mouth/Throat:      Mouth: Mucous membranes are moist.   Eyes:      Pupils: Pupils are equal, round, and reactive to light.   Cardiovascular:      Rate and Rhythm: Regular rhythm. Tachycardia present.      Pulses: Normal pulses.      Heart sounds: Normal heart sounds. No murmur heard.     No friction rub. No gallop.      Comments: R axillary 5.5 impella.   Pulmonary:      Effort: Pulmonary effort is normal. No accessory muscle usage or retractions.      Breath sounds: No wheezing, rhonchi or rales.   Abdominal:      General: Abdomen is flat. Bowel sounds are normal. There is no distension.      Palpations: Abdomen is soft. There is no mass.      Tenderness: There is no abdominal tenderness. There is no guarding.      Hernia: No hernia is present.   Musculoskeletal:      Cervical back: Full passive range of motion without pain.   Skin:     General: Skin is warm and dry.      Capillary Refill: Capillary refill takes less than 2 seconds.      Coloration: Skin is not jaundiced.      Findings: No laceration, rash or wound.   Neurological:      General: No focal deficit present.       Last Recorded Vitals  Blood pressure 116/66, pulse (!) 143, temperature  "36.5 °C (97.7 °F), temperature source Temporal, resp. rate (!) 36, height 1.549 m (5' 0.98\"), weight 90.6 kg (199 lb 11.8 oz), SpO2 96 %.  Intake/Output last 3 Shifts:  I/O last 3 completed shifts:  In: 2127.8 (23.5 mL/kg) [P.O.:1470; I.V.:657.8 (7.3 mL/kg)]  Out: 750 (8.3 mL/kg) [Urine:720 (0.2 mL/kg/hr); Drains:30]  Weight: 90.6 kg     Relevant Results  Scheduled medications  [Held by provider] aspirin, 81 mg, oral, Daily  calcitriol, 0.5 mcg, oral, Daily  cephalexin, 500 mg, oral, q8h TRICIA  ferrous sulfate (325 mg ferrous sulfate), 65 mg of iron, oral, Daily with breakfast  heparin (porcine), 5,000 Units, subcutaneous, q8h  hydrALAZINE, 100 mg, oral, q8h  insulin lispro, 0-10 Units, subcutaneous, Before meals & nightly  isosorbide dinitrate, 10 mg, oral, q8h  levothyroxine, 200 mcg, oral, Daily  lidocaine, 5 mL, infiltration, Once  lidocaine, 1 patch, transdermal, Daily  melatonin, 10 mg, oral, Nightly  multivitamin with minerals, 1 tablet, oral, Daily  nystatin, 5 mL, Swish & Swallow, q6h  pantoprazole, 40 mg, oral, Daily before breakfast  perflutren protein A microsphere, 0.5 mL, intravenous, Once in imaging  polyethylene glycol, 17 g, oral, BID  predniSONE, 10 mg, oral, Daily  rosuvastatin, 40 mg, oral, Nightly  sennosides-docusate sodium, 2 tablet, oral, BID  sirolimus, 2.5 mg, oral, Daily  sodium chloride, 5 spray, Each Nostril, 4x daily  [Held by provider] sulfamethoxazole-trimethoprim, 80 mg of trimethoprim, oral, Daily  sulfur hexafluoride microsphr, 2 mL, intravenous, Once in imaging  tacrolimus, 3 mg, oral, q12h TRICIA  traZODone, 50 mg, oral, Nightly  valGANciclovir, 450 mg, oral, q48h      Continuous medications  lactated Ringer's, 5 mL/hr, Last Rate: 5 mL/hr (03/04/24 1300)  lactated Ringer's, 10 mL/hr, Last Rate: Stopped (03/03/24 1200)  milrinone, 0.25 mcg/kg/min (Dosing Weight), Last Rate: 0.25 mcg/kg/min (03/04/24 1300)  sodium bicarbonate infusion Impella Purge 25 mEq/1000 mL D5W, 10 mL/hr      PRN " medications  PRN medications: acetaminophen, bisacodyl, calcium gluconate, calcium gluconate, dextrose, dextrose **OR** glucagon, glucagon, hydrALAZINE, artificial tears, magnesium sulfate, magnesium sulfate, microfibrllar collagen, mupirocin, ondansetron, oxyCODONE, oxymetazoline, potassium chloride, sodium chloride    Results for orders placed or performed during the hospital encounter of 02/15/24 (from the past 24 hour(s))   POCT GLUCOSE   Result Value Ref Range    POCT Glucose 229 (H) 74 - 99 mg/dL   Renal Function Panel   Result Value Ref Range    Glucose 242 (H) 74 - 99 mg/dL    Sodium 129 (L) 136 - 145 mmol/L    Potassium 4.1 3.5 - 5.3 mmol/L    Chloride 92 (L) 98 - 107 mmol/L    Bicarbonate 20 (L) 21 - 32 mmol/L    Anion Gap 21 (H) 10 - 20 mmol/L    Urea Nitrogen 93 (HH) 6 - 23 mg/dL    Creatinine 3.88 (H) 0.50 - 1.05 mg/dL    eGFR 15 (L) >60 mL/min/1.73m*2    Calcium 9.6 8.6 - 10.6 mg/dL    Phosphorus 4.7 2.5 - 4.9 mg/dL    Albumin 4.5 3.4 - 5.0 g/dL   Magnesium   Result Value Ref Range    Magnesium 2.49 (H) 1.60 - 2.40 mg/dL   CBC   Result Value Ref Range    WBC 17.8 (H) 4.4 - 11.3 x10*3/uL    nRBC 0.1 (H) 0.0 - 0.0 /100 WBCs    RBC 2.69 (L) 4.00 - 5.20 x10*6/uL    Hemoglobin 8.0 (L) 12.0 - 16.0 g/dL    Hematocrit 23.1 (L) 36.0 - 46.0 %    MCV 86 80 - 100 fL    MCH 29.7 26.0 - 34.0 pg    MCHC 34.6 32.0 - 36.0 g/dL    RDW 14.7 (H) 11.5 - 14.5 %    Platelets 116 (L) 150 - 450 x10*3/uL   Coagulation Screen   Result Value Ref Range    Protime 12.2 9.8 - 12.8 seconds    INR 1.1 0.9 - 1.1    aPTT 27 27 - 38 seconds   Tacrolimus level   Result Value Ref Range    Tacrolimus  7.4 <=15.0 ng/mL   Lactate Dehydrogenase   Result Value Ref Range     (H) 84 - 246 U/L   Hepatic function panel   Result Value Ref Range    Albumin 4.3 3.4 - 5.0 g/dL    Bilirubin, Total 0.8 0.0 - 1.2 mg/dL    Bilirubin, Direct 0.3 0.0 - 0.3 mg/dL    Alkaline Phosphatase 40 33 - 110 U/L    ALT 5 (L) 7 - 45 U/L    AST 32 9 - 39 U/L     Total Protein 6.1 (L) 6.4 - 8.2 g/dL   Calcium, Ionized   Result Value Ref Range    POCT Calcium, Ionized 1.18 1.1 - 1.33 mmol/L   CBC   Result Value Ref Range    WBC 13.8 (H) 4.4 - 11.3 x10*3/uL    nRBC 0.2 (H) 0.0 - 0.0 /100 WBCs    RBC 2.55 (L) 4.00 - 5.20 x10*6/uL    Hemoglobin 7.3 (L) 12.0 - 16.0 g/dL    Hematocrit 22.2 (L) 36.0 - 46.0 %    MCV 87 80 - 100 fL    MCH 28.6 26.0 - 34.0 pg    MCHC 32.9 32.0 - 36.0 g/dL    RDW 14.8 (H) 11.5 - 14.5 %    Platelets 110 (L) 150 - 450 x10*3/uL   Magnesium   Result Value Ref Range    Magnesium 2.40 1.60 - 2.40 mg/dL   Basic Metabolic Panel   Result Value Ref Range    Glucose 173 (H) 74 - 99 mg/dL    Sodium 129 (L) 136 - 145 mmol/L    Potassium 4.2 3.5 - 5.3 mmol/L    Chloride 92 (L) 98 - 107 mmol/L    Bicarbonate 21 21 - 32 mmol/L    Anion Gap 20 10 - 20 mmol/L    Urea Nitrogen 99 (HH) 6 - 23 mg/dL    Creatinine 4.15 (H) 0.50 - 1.05 mg/dL    eGFR 14 (L) >60 mL/min/1.73m*2    Calcium 9.4 8.6 - 10.6 mg/dL   Phosphorus   Result Value Ref Range    Phosphorus 4.9 2.5 - 4.9 mg/dL   TSH   Result Value Ref Range    Thyroid Stimulating Hormone 35.92 (H) 0.44 - 3.98 mIU/L   POCT GLUCOSE   Result Value Ref Range    POCT Glucose 149 (H) 74 - 99 mg/dL   Prepare RBC: 1 Units   Result Value Ref Range    PRODUCT CODE Q6755O04     Unit Number [id number]     Unit ABO O     Unit RH POS     XM INTEP COMP     Dispense Status TR     Blood Expiration Date April 01, 2024 23:59 EDT     PRODUCT BLOOD TYPE 5100     UNIT VOLUME 350    Blood Gas Venous Full Panel   Result Value Ref Range    POCT pH, Venous 7.37 7.33 - 7.43 pH    POCT pCO2, Venous 33 (L) 41 - 51 mm Hg    POCT pO2, Venous 45 35 - 45 mm Hg    POCT SO2, Venous 73 45 - 75 %    POCT Oxy Hemoglobin, Venous 71.0 45.0 - 75.0 %    POCT Hematocrit Calculated, Venous 22.0 (L) 36.0 - 46.0 %    POCT Sodium, Venous 123 (L) 136 - 145 mmol/L    POCT Potassium, Venous 4.3 3.5 - 5.3 mmol/L    POCT Chloride, Venous 92 (L) 98 - 107 mmol/L    POCT  Ionized Calicum, Venous 1.14 1.10 - 1.33 mmol/L    POCT Glucose, Venous 334 (H) 74 - 99 mg/dL    POCT Lactate, Venous 2.6 (H) 0.4 - 2.0 mmol/L    POCT Base Excess, Venous -5.6 (L) -2.0 - 3.0 mmol/L    POCT HCO3 Calculated, Venous 19.1 (L) 22.0 - 26.0 mmol/L    POCT Hemoglobin, Venous 7.2 (L) 12.0 - 16.0 g/dL    POCT Anion Gap, Venous 16.0 10.0 - 25.0 mmol/L    Patient Temperature 37.0 degrees Celsius    FiO2 21 %   Transthoracic Echo (TTE) Limited   Result Value Ref Range    AV pk reji 1.75 m/s    LV biplane EF 34 %    RV free wall pk S' 7.62 cm/s    LVIDd 4.60 cm    RVSP 35.9 mmHg    AV pk grad 12.3 mmHg    LV A4C EF 25.7    T3, free   Result Value Ref Range    Triiodothyronine, Free 1.1 (L) 2.3 - 4.2 pg/mL   Magnesium   Result Value Ref Range    Magnesium 2.14 1.60 - 2.40 mg/dL   CBC   Result Value Ref Range    WBC 15.2 (H) 4.4 - 11.3 x10*3/uL    nRBC 0.0 0.0 - 0.0 /100 WBCs    RBC 3.01 (L) 4.00 - 5.20 x10*6/uL    Hemoglobin 8.9 (L) 12.0 - 16.0 g/dL    Hematocrit 24.6 (L) 36.0 - 46.0 %    MCV 82 80 - 100 fL    MCH 29.6 26.0 - 34.0 pg    MCHC 36.2 (H) 32.0 - 36.0 g/dL    RDW 14.2 11.5 - 14.5 %    Platelets 119 (L) 150 - 450 x10*3/uL   Calcium, Ionized   Result Value Ref Range    POCT Calcium, Ionized 1.09 (L) 1.1 - 1.33 mmol/L   Renal function panel   Result Value Ref Range    Glucose 777 (HH) 74 - 99 mg/dL    Sodium 112 (LL) 136 - 145 mmol/L    Potassium 3.8 3.5 - 5.3 mmol/L    Chloride 80 (L) 98 - 107 mmol/L    Bicarbonate 18 (L) 21 - 32 mmol/L    Anion Gap 18 mmol/L    Urea Nitrogen 90 (H) 6 - 23 mg/dL    Creatinine 3.81 (H) 0.50 - 1.05 mg/dL    eGFR 15 (L) >60 mL/min/1.73m*2    Calcium 8.3 (L) 8.6 - 10.6 mg/dL    Phosphorus 4.5 2.5 - 4.9 mg/dL    Albumin 3.8 3.4 - 5.0 g/dL   Coagulation Screen   Result Value Ref Range    Protime 13.5 (H) 9.8 - 12.8 seconds    INR 1.2 (H) 0.9 - 1.1    aPTT 26 (L) 27 - 38 seconds   BLOOD GAS VENOUS FULL PANEL   Result Value Ref Range    POCT pH, Venous 7.37 7.33 - 7.43 pH    POCT  pCO2, Venous 34 (L) 41 - 51 mm Hg    POCT pO2, Venous 40 35 - 45 mm Hg    POCT SO2, Venous 65 45 - 75 %    POCT Oxy Hemoglobin, Venous 63.2 45.0 - 75.0 %    POCT Hematocrit Calculated, Venous 29.0 (L) 36.0 - 46.0 %    POCT Sodium, Venous 125 (L) 136 - 145 mmol/L    POCT Potassium, Venous 4.5 3.5 - 5.3 mmol/L    POCT Chloride, Venous 93 (L) 98 - 107 mmol/L    POCT Ionized Calicum, Venous 1.15 1.10 - 1.33 mmol/L    POCT Glucose, Venous 168 (H) 74 - 99 mg/dL    POCT Lactate, Venous 1.4 0.4 - 2.0 mmol/L    POCT Base Excess, Venous -5.0 (L) -2.0 - 3.0 mmol/L    POCT HCO3 Calculated, Venous 19.7 (L) 22.0 - 26.0 mmol/L    POCT Hemoglobin, Venous 9.5 (L) 12.0 - 16.0 g/dL    POCT Anion Gap, Venous 17.0 10.0 - 25.0 mmol/L    Patient Temperature 37.0 degrees Celsius    FiO2 21 %   POCT GLUCOSE   Result Value Ref Range    POCT Glucose 175 (H) 74 - 99 mg/dL     *Note: Due to a large number of results and/or encounters for the requested time period, some results have not been displayed. A complete set of results can be found in Results Review.         Assessment/Plan   Assessment: Ms. 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    Originally presented to [facility] ED on 2/15/24 with complaints of N/V x 3 days and inability to keep medications down. POCUS revealed acute systolic heart failure w/ severe BIV dysfunction when compared to previous images in 11/2023. Also noted to have HIRAM requiring CVVH and transaminitis. Immunosuppression notably below therapeutic range. Admitted to HFICU and treated for suspected acute cellular vs. acute antibody mediated rejection; however, cardiac biopsy negative for signs  of rejection. Despite rejection therapy, inotropes and MCS via IABP (2/18/24), the patient's end organ function continued to worsen without improvement in cardiac function. Ultimately placed on left femoral VA ECMO w/ Dr. Marina on 2/19/2024 and transferred to CTICU.     CTICU course complicated by anemia requiring blood product transfusions, epistaxis, volume overload & intubation (tachypnea and increased work of breathing 2/2 pulmonary edema). Status post extubation on 2/24/24. Now showing renal recovery off CVVH and cardiac allograft recovery s/p ECMO decannulation on 2/29/24 w/ Dr. Witt.     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)  Toxo: -/-   Hep C: -/-    Rejection/Prophylaxis (transplants):  - Steroids: Continue 10mg PO prednisone daily  - Tacrolimus: 3.0mg PO @ 0630 & 3.0mg PO @ 1830 w/ daily levels drawn @ 0600  - Serolimus: 2.5mg PO daily w/ daily levels drawn at 0600  - Tacrolimus goal troughs: 3-5  - Serolimus goal troughs: 7-9  - Combined sirolimus/tacrolimus goal of 10-12  - Antifungals: nystatin 500,000units Q6  - Antivirals: continue valcyte 450mg Q48hrs due to low grade viremia   - Anti PCP & Toxoplasmosis: Bactrim SS daily  --> Holding due to thrombocytopenia (2/23)    Last cardiac biopsy: 2/16/24 with ACR1 and no AMR  Last HLA: 2/16/24 negative for DSAs   Last RHC: 2/16/24 w/ RA 11, PAP 34/14(23), W 19 and C.I. 2.3  Last LHC: 2/18/24 negative for CAV and CAD   Last TTE/BOB: 3/1/24 shows LVEF to 30% and mild RV dysfunction (intra-op impella 5.5 insert)  Osteopenia/osteoporosis prophylaxis: Vitamin D3 and calcium supplements  Peptic/gastric ulcer prophylaxis:  Pantoprazole 40mg daily   CAV Prophylaxis: Aspirin 81mg daily & pravastatin daily    - Unclear cause of acute severe graft dysfunction. Most likely smoldering ACR vs. AMR; however, 2xallograft biopsies on 2/16 & 2/20 remain negative for any significant pathology. Notably, both biopsies taken after rejection therapies implemented which may have reduced areas of graft damage.    - DSAs remain negative; however, patient may have non-HLA antibodies present. 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   - Negative CAV & CAD via LHC on 2/18/24   - Completed methylpred steroid pulse w/ 1g Q24 x 3 days (2/16-2/18) and prednisone taper   - Thymoglobulin doses: 2/18 & 2/19   - IVIG + PLEX Session: 2/18 & 2/20   - Extubated on 3/2/24.  - CVVH stopped on 2/27/24. HIRAM previously improving and autodiuresing; however, since ECMO decannulation, UOP has decreased and creatinine uptrending   - Graft function slightly worse after transition to impella 5.5. IABP removed 3/1/24.  - On Milrinone 0.25mcg/kg/min. Impella up to P9. SGC removed 3/2/24. Team is trying to liberate from lines as able.     Recommendations:  - Adjust dose of tacrolimus 3 mg po bid. Target tacrolimus trough level: 3-5  - C/w Sirolimus 2.5 mg po daily. Target sirolimus trough level: 7-9  - C/w Prednisone 10 mg po every day.   - Continue valcyte 450mg Q48hrs and continue to check CMV PCRs weekly (next due on 3/8/24)   - Continue Hydralazine 100 mg po tid and Isordil 10 mg po tid.   - Plan for RHC with endomyocardial biopsy on 3/6/24. Epic case order placed.   - Nephrology consult.   - Patient has ongoing worsening renal function. Her possible candidacy for re-transplant will have to be reviewed.     Patient was examined and discussed with HF attending, Dr. DENNIS Price.     Jason Burns DO  Advanced Heart Failure & Transplant Fellow

## 2024-03-04 NOTE — SIGNIFICANT EVENT
"ENT reengaged for bleeding after accidental displacement of the merocel packing on the left due to sneezing.    Procedure note: Control of epistaxis  Verbal consent was obtained from patient.  Suction was used to suction out the right and left nasal cavity and oral cavity.  Bleeding was noted from the left anterior septum.  Merocel which was then placed in the nose on the left side. Afrin was then sprayed around the Merocel. Bleeding was noted anteriorly to the right septum. This area was cauterized and surgicel was placed over the cauterized area as there was still oozing after cautery. Patient tolerated procedure well.    Assessment  Charla Bowles is a 33 y.o. female who presented to [facility] on 2/15/2024. ENT consulted for epistaxis and status post placement of nonabsorbable Merocel packing in the left nasal cavity.     Recommendations:  - Can use oxymetazoline (Afrin) 2 sprays each nostril every 8 hours for the next two days (three days total) if further bleeding or oozing occurs. Do not use for more than 3 days total. This can be applied even with packing in the nares.  - If further bleeding occurs, use copious oxymetazoline spray for epistaxis and pinch the nose (\"hold pressure\") for 15 minutes without letting go. If this does not control bleeding then gently blow out clot and repeat 2 more times.  -Nasal saline or Ayr nasal gel multiple times a day for 10 days - please apply even with packing in the nares  -BP control per primary team  -Avoid digital trauma or sticking objects into nose  -Humidifier at night, avoid heaters directly blowing in face  -Keflex (or other abx covering S. aureus) while packing is in, please provide prescritpion if discharged  -If O2 needed, use humidified air. Recommend humidifier for home use  -Packing will remain for 3-5 days, ENT will remove                 "

## 2024-03-04 NOTE — OP NOTE
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    Procedure:  1.  Cannulation of the right axillary artery with a 10 mm vascular graft;  2.  Placement of the Impella 5.5 via  10 mm vascular graft   3.  Removal of the intra-aortic balloon pump from the right femoral artery.    Surgeon: Danny Viramontes MD, PhD    Anesthesia: General    Complications: None    Condition to intensive care unit: Stable      Indications.  This is a 24 years old lady who is about 2 years after orthotopic heart transplantation.  Patient presented a few days ago with acute on chronic rejection of the allograft with severe biventricular dysfunction and cardiogenic shock.  She had intra-aortic balloon pump placed on admission, however subsequently required placement on venoarterial ECMO.  Patient was maintained on V-A ECMO for over a week.  Her condition had improved, the ECMO was weaned, and she was decannulated on 2/29th.  However, over subsequent almost 24 hours patient tachycardia had worsened, urine output decreased to minimal and overall show condition had worsened enough that she clearly required mechanical circulatory support.  Her right ventricular function had improved so we thought that the patient would most benefit from placement of the Impella 5.5 while she is going through the evaluation for potential re-transplantation.  I spoke to the patient in great details about this plan, she appeared to understand, and informed consent was signed.      Findings.  Impella 5.5 was placed via 10 mm vascular graft attached to the right axillary artery under fluoroscopy control.   Excellent flows were obtained in excess of 5 L/min.  Intra aortic balloon pump was removed routinely.    Description.    The patient was taken to the operating room and placed supine on the operating table. The patient arrived to the OR intubated, was placed under general anesthesia, and was prepped and draped in the usual sterile fashion.  A time out was performed.  A right infraclavicular incision was made.  This was carried down through the muscle and the right axillary artery was exposed.  The artery was dissected circumfrentially and vessel loops were placed proximally and distally.  Heparin was administered with a goal ACT >250.  DeBakey vascular clamps were placed proximally and distally and a 1cm arteriotomy was made.  A 10mm gelweave graft was anastomosed using a running 6-0 prolene suture.  Coseal was applied.  The clamps were removed and the graft was flushed.  The graft was then tunneled out lateral and inferior to the incision.  The Impella sheath was secured inside the graft.  Fluoroscopy was utilized to guid a wire and pigtail across the aortic valve and into the left ventricle.  The wire was exchanged for the Impella wire and the pigtail was removed.  The impella was guided into position over the wire with BOB and fluoroscopy.  Once in place, the wire was removed and the Impella started.  The Impella was secured into position.  A KAREN drain was placed in the wound.  Hemostasis was meticulously secured.  The incision was closed in layers with absorbable suture.  A dry sterile dressing was placed. IABP was removed leaving the sheath in place.  All instrument, needle and sponge counts were correct at the end of the case.  The patient was left intubated and transferred back to the ICU in stable condition.

## 2024-03-05 NOTE — PROGRESS NOTES
6/6 CTICU     OVERVIEW  03/2022 (OHT) Heart Transplant presented to [facility] ED 2/15 for n/v. 2/16 heart biopsy with rejection. 2/18 IABP. 2/19 VA-ECMO & CVVH --> transferred from  to CTICU. Presently 2/20 on room air. CTICU course complicated by ongoing anemia requiring transfusion requirements, volume overload & 2/21 intubation (tachypnea and increased work of breathing 2/2 pulmonary edema). 2/24 extubated. 2/29 decannulated. 3/1 IABP removed. 3/4 - Central line placed for HD.      DC PLAN  TBD - Care Transitions is following to develop a safe & supportive discharge plan in collaboration with TRANSPLANT & multidisciplinary teams (along with) patient/family/significant others.       PAYOR  [facility]      PT/OT  As of most recent intervention, 3/4 OT made a rec of high intensity    COMPLETED  (X) Daily ongoing review of patient via chart and/or (M-F) IDT rounds     Claribel Diaz (LSW, MSW)

## 2024-03-05 NOTE — PROGRESS NOTES
CTICU Attending Assessment Note    This is a 33 yoF who is ~1 year post-heart transplant presenting with acute rejection in cardiogenic shock who requiring MCS with ECMO but now decannulated continuing to require support with Impella.    Neuro: Awake, alert, oriented x3. CAM negative. Continue PO/topical multimodal pain therapy to adequately control pain. Continue CAM assessment and encourage restful sleep per ICU protocols.  Continue to promote mobility. Continue trazadone and melatonin for sleep promotion.  ENT: Epistaxis has resolved with packing. Continue to appreciate ENT recommendations.  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 Goal MAP 70-90 and CI >2.2. Persistent tachycardia of unclear etiology without signs of malperfusion, may be in part due to milrinone but continues to require inotropy. EKG with sinus tachycardia without arrhythmia. Continue afterload reduction with hydralazine 100 mg and isordil with goal MAP 70-90. Plan for IVIG and plexus per HF recommendations to continue treatment of concern for rejection. Continue ASA and statin.  Pulm: Currently on RA.  Continue aggressive pulmonary toilet with clear CXR.  Renal: Acute renal failure with oliguria with worsening creatinine, bicarbonate, and sodium and will plan for iHD. Hyponatremia is stable Will aim net negative fluid balance. Strict I&O with goal UOP >0.5 mL/kg/hr.  Electrolyte protocol.  GI: Constipation has resolved yesterday and continue BM. Continue tolerating diet and will change to renal diet today.  Heme: Acute postoperative blood loss anemia with goal Hgb >7 and platelets >50. No ongoing signs of bleeding today given resolution of epistaxis and will continue to follow CBC BID.  Endo: Will continue SSI protocol with goal -180 per post-cardiotomy goal. Continue levothyroxine given hx of hypothyroidism.  ID: No other indications for antibiotics at this time.  Continue tacrolimus, valacyclovir, prednisone, sirolimus and appreciate HF recommendations regarding immunosuppression. Will plan for IVIG and plasma exchange today per HF and will engage transfusion medicine.  Will stop Keflex today given removal of packing.  Skin/MSK: Surgical site is C/D/I.  Continue to promote mobility.  Prophy: Continue SCD, SQH, PPI.  A-F Bundle reviewed and addressed as above with multidisciplinary rounds completed at bedside.  Dispo: Continue CTICU care.    Due to the high probability of life threatening clinical decompensation, the patient required critical care time evaluating and managing this patient.  Critical care time included obtaining a history, examining the patient, ordering and reviewing studies, discussing, developing, and implementing a management plan, evaluating the patient's response to treatment, and discussion with other care team providers. I saw and evaluated the patient myself. I reviewed the provider's documentation and discussed the patient with the provider. Critical care time was performed exclusive of billable procedures.    Critical Care Time: 44 minutes    Feliciano Lee MD  Emergency Critical Care Attending Physician

## 2024-03-05 NOTE — PROGRESS NOTES
CTICU Progress Note  Charla Bowles/[id number]    Admit Date: 2/15/2024  Hospital Length of Stay: 19   ICU Length of Stay: 14d 15h   CT SURGEON:     SUBJECTIVE:   Overnight no acute events, remained in sinus tach and oliguric.    MEDICATIONS  Infusions:  lactated Ringer's, Last Rate: 5 mL/hr (03/05/24 0417)  lactated Ringer's, Last Rate: Stopped (03/03/24 1200)  milrinone, Last Rate: 0.25 mcg/kg/min (03/05/24 0417)  sodium bicarbonate infusion Impella Purge 25 mEq/1000 mL D5W      Scheduled:  [Held by provider] aspirin, 81 mg, Daily  calcitriol, 0.5 mcg, Daily  cephalexin, 500 mg, q8h TRICIA  ferrous sulfate (325 mg ferrous sulfate), 65 mg of iron, Daily with breakfast  heparin (porcine), 5,000 Units, q8h  hydrALAZINE, 100 mg, q8h  insulin lispro, 0-10 Units, Before meals & nightly  isosorbide dinitrate, 40 mg, q8h  levothyroxine, 250 mcg, Daily  lidocaine, 5 mL, Once  lidocaine, 1 patch, Daily  melatonin, 10 mg, Nightly  multivitamin with minerals, 1 tablet, Daily  nystatin, 5 mL, q6h  pantoprazole, 40 mg, Daily before breakfast  perflutren protein A microsphere, 0.5 mL, Once in imaging  polyethylene glycol, 17 g, BID  predniSONE, 10 mg, Daily  rosuvastatin, 40 mg, Nightly  sennosides-docusate sodium, 2 tablet, BID  sirolimus, 2.5 mg, Daily  sodium chloride, 5 spray, 4x daily  [Held by provider] sulfamethoxazole-trimethoprim, 80 mg of trimethoprim, Daily  sulfur hexafluoride microsphr, 2 mL, Once in imaging  tacrolimus, 3 mg, q12h TRICIA  traZODone, 50 mg, Nightly  valGANciclovir, 450 mg, q48h      PRN:  acetaminophen, 975 mg, q8h PRN  bisacodyl, 10 mg, Daily PRN  calcium gluconate, 1 g, q6h PRN  calcium gluconate, 2 g, q6h PRN  dextrose, 25 g, q15 min PRN  dextrose, 25 g, q15 min PRN   Or  glucagon, 1 mg, q15 min PRN  glucagon, 1 mg, q15 min PRN  hydrALAZINE, 20 mg, q4h PRN  artificial tears, 2 drop, PRN  magnesium sulfate, 1 g, q6h PRN  magnesium sulfate, 2 g, q6h PRN  microfibrllar collagen, , PRN  mupirocin, ,  "BID PRN  ondansetron, 4 mg, q4h PRN  oxyCODONE, 5 mg, q4h PRN  oxymetazoline, 2 spray, q12h PRN  sodium chloride, 1 spray, 4x daily PRN        PHYSICAL EXAM:   Visit Vitals  /83   Pulse (!) 140   Temp 36.4 °C (97.5 °F) (Temporal)   Resp 22   Ht 1.549 m (5' 0.98\")   Wt 93.2 kg (205 lb 7.5 oz)   SpO2 95%   BMI 38.84 kg/m²   OB Status Hysterectomy   Smoking Status Never   BSA 2 m²     Wt Readings from Last 5 Encounters:   03/05/24 93.2 kg (205 lb 7.5 oz)   12/07/23 92.1 kg (203 lb)   12/01/23 93 kg (205 lb)   11/29/23 92.9 kg (204 lb 12.8 oz)   11/09/23 91.3 kg (201 lb 3.2 oz)     INTAKE/OUTPUT:  I/O last 3 completed shifts:  In: 1388.9 (14.9 mL/kg) [P.O.:450; I.V.:578.9 (6.2 mL/kg); Blood:360]  Out: 1145 (12.3 mL/kg) [Urine:1130 (0.3 mL/kg/hr); Drains:15]  Weight: 93.2 kg        Physical Exam:   - CONSTITUTION: Young appearing female with impella 5.5  - NEUROLOGIC: intact.  Alert, following all commands.   - CARDIOVASCULAR: R axillary impella, no bleeding at site.  P9, 5.2L flow. Sinus tachycardia.  - RESPIRATORY: clear on RA.  No secretions  - GI: soft, obese. Hypoactive BS  - : oliguric   - EXTREMITIES:  Palpable pulses throughout  - SKIN: intact  - PSYCHIATRIC: calm    Daily Risk Screen  Central line: RIJ trialysis, Maintain for inotropic support    Images: CXR w/ mild pulmonary congestion    Invasive Hemodynamics:    Most Recent Range Past 24hrs   BP (Art) 133/102 No data recorded   MAP(Art) 110 mmHg No data recorded   RA/CVP   No data recorded   PA 27/19 No data recorded   PA(mean) 22 mmHg No data recorded   CO 4.9 L/min No data recorded   CI 2.5 L/min/m2 No data recorded   Mixed Venous 63 % No data recorded   SVR  1217 (dyne*sec)/cm5 No data recorded         Assessment/Plan   33 year old female with past medical history of heart transplant in March 2022 with postoperative course complicated by upper extremity/internal jugular DVTs, and 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 She was admitted to HFICU " on 2/15 with concern for cardiogenic shock secondary to allograft rejection and decompensated heart failure with multiorgan dysfunction including significant elevation of liver enzymes and nonoliguric acute kidney injury. Endomyocardial biopsy on 2/16 revealed mild ACR with +CD4s and negative HLAs, however multisystem organ failure persisted with increased inotropic requirements. IABP placed on 2/18. Transferred to CTICU on 2/19 for [facility] ECMO cannulation with Dr. Marina for persistent multi-organ system dysfunction. Intubated 2/21 for respiratory failure 2/2 pulmonary edema, extubated 2/24. ECMO de-cannulated 2/29/24 but had worsening HIRAM and overall declined clinical status and IABP was transitioned to R axillary impella 5.5 3/1.      Plan:  NEURO: No history. Neuro intact with minima pain. Previous insomnia improved -->  - Serial neuro and pain assessments  - PRN Tylenol 975 mg q6  - Continue oxy 5mg PRN.   - PT/OT Consult  - Continue scheduled melatonin 10 mg nightly and Trazodone 50 mg PRN for insomnia  - CAM ICU score qshift. Sleep/wake cycle hygiene     ENT: Pt with signficant epistaxisis overnight 2/28 requiring ENT consult ~ R anterior nare packed with surgicel with resolution. Completed afrin x3 days.  3/3 nose packed by ENT. 3/5 patient removed. Nose stopped bleeding  - Sellers spray 5x day x10 days (completes 3/9)  - Keflex discontiued  - prn afrin for nose bleeding  - prn ocean spray and mupiricin for dry nasal passages     CV: Patient has a history of heart transplant in March of 2022 with TTE on 11/29 with LVEF 60-65%. Admitted for cardiogenic shock with concern for acute cellular rejection s/p IABP placement (R fem) 2/18 and [facility] ECMO (left fem) 2/19. S/p ECMO decannulation 2/29 with subsequent decline now s/p impella 5.5 and IABP removal 3/1. ECHO 3/1 with EF 30-35% with persisting RV dysfunction.  Currently on milrinone 0.25 mcg/kg/min with impella at P9/4.9L with stable end organ perfusion.  Still trending  hypertensive. Tachycardia persisting, unchanged despite repeated changes in therapies.  -->  - Maintain goal MAP 70-90  - Continue full impella support P9  - continue milrinone 0.25  - PO hydralazine to 100mg q8h and PRN hydral 10 mg for MAP >90  - Increase isosorbide to 40mg every 8 hours  - Continue home rosuvastatin 40mg daily  - Continue ASA 81 mg daily  - Hold home amlodipine     PULM: No history. Acute respiratory failure now resolved, intubated 2/21-2/24. Had been on RA. Reintubated for OR 3/1 and extubated 3/2.  On RA -->  - Daily CXR  - Maintain SPO2 > 92%     GI: History of gastric bypass and MMF colitis.  Shock liver resolved.  3/4 Enema and last BM. Tolerating oral diet. -->  - Continue home PPI, calcitriol 0.5mg daily, multivitamin, calcium carbonate 1,250mg BID  - continue diet  - Continue miralax BID & senna-colace BID.      : No history, baseline Cr 1.2-1.3.  HIRAM previously requiring CVVH, likely in setting of cardiorenal physiology. Renal US from 2/19 unremarkable.  Remains off CVVH and making urine. HIRAM worsening with worsening hyponatremia. Euvolemic on exam. -->  - RFP as clinically indicated, replete electrolytes per CTICU protocol  - Start IHD today  - nephrology consult     ENDO: History of hypothyroidism TSH 12.02, free thyroxine 2.19 (Endo previously consulted but now signed off). A1c: 6.3. Acceptable glycemic control on SSI. 3/4 TSH 35-->  - Maintain BG <180 with hypoglycemia protocol  - continue SSI  - Increase Synthroid to 250mcg daily  - Endocrine following     HEME: History of remote DVT. PF4 2/21 negative. Acute on chronic iron deficiency anemia. Acute blood loss anemia with repeated transfusions improved off systemic AC, downtrend to 7.3 this morning. Plts overall stable with no obvious active bleeding. 3/4 1 unit prbc and incremented appropriately -->    - CBC as clinically indicated  - Continue daily ferrous sulfate  - SQH only per discussion with Dr. Viramontes  - Sujatha alexandra  purge for impella  - Continue ASA  - Will have to discuss with surgeons before systemic heparin  - SCDs and for DVT prophylaxis  - DVT scan of extremities for surveillance   - Last type and screen: 3/2-in process     Rejection/prophylaxis: S/p heart transplant 3/31/2022. Induction basiliximab. Donor HCV -, toxo -/-, CMV -/+. Mild ACR with +CD4 and negative HLAs however clinical presentation concern for [facility] rejection.  PLEX/IVIG 2/17, 2/20. Thymo 2/18, 2/19. Repeat biopsy 2/20 with no evidence of on going rejection (ACR 0R, pAMR0 and no C3D or C4D).  - Continue prednisone 10mg daily  - Continue tacrolimus 4 mg BID with goal level 3-5  - Continue sirolimus 2.5 mg daily with goal level 7-9  - Might receive another PLEX/IVIG or thymo this week per HF  - Continue Nystatin suspension 500,000 units q6hr  - Hold bactrim in setting of thrombocytopenia per Transplant  - continue valcyte 450mg q48hrs per transplant team for viremia  - Biopsy cancelled  - Transfusion medicine consulted for PLEX. First session today, Thursday Friday Monday Wednesday     ID: Received Zosyn and Vancomycin on 2/15 in ED. Blood cultures from 2/15 negative. Mild leukocytosis and afebrile. -->  - Trend temp q4h     Skin: No active skin issues.   - Preventative Mepilex dressings in place on sacrum and heels  - Change preventative Mepilex weekly or more frequently as indicated (when moist/soiled)   - Every shift skin assessment per nursing and weekly ICU skin rounds  - Moisture barrier to be applied with chritsopher care  - Active skin problems addressed with nursing on daily rounds     Proph:  SCDs  SQH  Home PPI     G: Lines  RIJ Trialysis - 3/5  Singh - 3/4     Code status: Full Code      Restraints: none      I personally spent 45 minutes of critical care time directly and personally managing the patient exclusive of separately billable procedures.     A,B,C,D,E,F,G: reviewed     Dispo: CTICU care for now. Possible transfer to HFICU this week.    CTICU  TEAM PHONE [phone]

## 2024-03-05 NOTE — NURSING NOTE
Apheresis Nursing Note    Charla Bowles is a 33 y.o. female presenting for Plasma Exchange for [facility]- Cardiac.    Pre-Procedure Assessment  Pre-Procedure Assessment  Level of Consciousness: Alert  Orientation Level: Oriented X4  Cognition: Follows Commands  Pain Score: 0 - No pain  Access Sites 'Okay to Use' Obtained: Yes  Access Site(s) Assessment: Yes  Estimated Replacement Volume: 3000  Vital Signs  Temp: 36.6 °C   Heart Rate: (!) 138  Resp: (!) 30  BP: 128/86  Medical Gas Therapy  SpO2: 96 %  Pulse Oximetry Type: Continuous  Oximetry Probe Site Location: Finger  Patient Activity During SpO2 Measurement: At rest  Medical Gas Therapy: None (Room air)  Procedure Information and Time Out  Procedure Type: Therapeutic plasma exchange  Plasma Diagnosis: Antibody-mediated rejection, cardiac  TPE Procedure Number: 1  Volume Processed (L): 1.0  Fluid Balance: 95%  Albumin : Plasma Ratio: 0:100  TPE Time-Out Completed: Yes  Provider Time Out Completed with: Dr.B. Theodore  Time Out Completed on: 03/05/24  Time Out Completed at: 1204  Equipment and Disposables  Apheresis Machine: Spectra Optia [id number]  Apheresis Machine PM in Date: Yes  Blood Warmer: Astotherm DNA [id number]  Blood Warmer PM in Date: Yes  Kit and Blood/Fluid Warmer Inspection: Tubing inspected with machine prime, Tubing inspected prior to connection to patient  Kit Type: Exchange kits  Kit Lot Number: [id number]  Kit Expiration Date: 10/01/25  ADC-A Used as Anticoagulant: Yes  ACD-A Lot #: [id number]  ACD-A Expiration Date: 10/01/25  9% Sodium Chloride Lot # (Liter): [id number]  9% Sodium Chloride Expiration Date (Liter): 06/30/25  Procedure Start  Start Time: 1211    Post-Procedure Assessment:  Post-Procedure  End Time: 1435  Waste Materials Collected for Research: No  Procedure Outcome: Treatment completed successfully  Adverse Event: No  Post-Procedure Line Assessment: Patent  Post-Procedure Access Care : Loaded/flushed per order, Caps changed, 'Do Not Flush'  label applied  Vital Signs  Temp: 37 °C (98.6 °F)  Heart Rate: (!) 143  Resp: (!) 30  BP: 118/73  Medical Gas Therapy  SpO2: 96 %  Pulse Oximetry Type: Continuous  Oximetry Probe Site Location: Finger  Patient Activity During SpO2 Measurement: At rest  Medical Gas Therapy: None (Room air)  Post-Procedure Patient Fluid Values   Replacement Fluid Intake (mL): 2954 mL  AC Infused (mL): 57 mL  Rinseback Intake (mL): 156 mL  Ca+ Solution Intake (mL): 276 mL  Other Intake (mL): 0 mL  Apheresis Intake Total (mL): 3443 mL  Removed During Apheresis Output (mL): 3868 mL  AC in Bag Output (mL): 485 mL  Samples Output (mL): 15 mL  Apheresis Output Total (mL): 3398 mL  Inlet Volume Processed (mL): 5422 mL  Net Difference (mL): 45 mL  Disposition  Patient's Condition at End of Procedure: Stable  Disposition: N/A - procedure performed at bedside  Transferred via:  (n/a)  Report Given to: Floor Nurse  $ Apheresis Charge: Therapeutic plasma exchange    Yaz Nava RN

## 2024-03-05 NOTE — PROGRESS NOTES
PRE-TRANSPLANT EDUCATION    Patient received education regarding the following topics as part of their pre-transplant evaluation:  The evaluation process, including:        o   Transplant team members and roles         o   Required consultations and testing        o   Selection criteria and suitability for transplant        o   Listing process and receiving an organ offer        o   Psychosocial and financial considerations for a successful transplant        o   Patient responsibilities, including the necessity of adhering to a strict medical regimen  An overview of the surgical procedure   Potential medical, surgical, and psychosocial risks to transplantation, including:        o   Wound infection        o   Pneumonia        o   Blood clot formation        o   Organ rejection, failure, and possibility of re-transplantation        o   Lifetime immunosuppression therapy and associated risks        o   Arrhythmias and cardiovascular collapse        o   Multi-organ system failure        o   Death        o   Depression        o   Post-Traumatic Stress Disorder        o   Generalized anxiety, issues of dependence, and feelings of guilt  Available alternatives to transplantation  National and Transplant [place] outcomes for one-year patient and graft-survival from the most recent [facility] program-specific report.     Donor risk factors that could affect the success of the transplant and the health of the patient, including:        o   Donor age        o   Donor medical and social history        o   Condition of the organ        o   Risk of krishan cancer, HIV, Hepatitis B, Hepatitis C, or malaria if the infection is not detectable at the time of donation  Patient's right to withdraw consent for transplantation at any time during the process  Transplants not performed in a Medicare-approved transplant center could affect the patient's ability to have immunosuppression medication paid for under Medicare part B.      Patient was given the opportunity to have questions answered.    Signed evaluation informed consent received? Yes     Patient and her mother received education for heart transplant at the bedside.

## 2024-03-05 NOTE — PROGRESS NOTES
"[facility]  ADVANCED HEART FAILURE AND TRANSPLANT CARDIOLOGY     Charla Bowles is a 33 y.o. female on day 12 of admission presenting with Cardiogenic shock (CMS/HCC).    INTERVAL EVENTS / PERTINENT ROS:    Patient did not respond to Lasix challenge in the past 24 hours. She remains on Impella5.5 @ P-9 support and Milrinone 0.25 mcg gtt. No Impella suction events. She was agreeable to proceed with advanced therapy evaluation. Telemetry monitor shows sinus tachycardia at 130-140s.     Objective     Physical Exam  Constitutional:       General: She is not in acute distress.  HENT:      Head: Normocephalic.      Mouth/Throat:      Mouth: Mucous membranes are moist.   Eyes:      Pupils: Pupils are equal, round, and reactive to light.   Cardiovascular:      Rate and Rhythm: Regular rhythm. Tachycardia present.      Pulses: Normal pulses.      Heart sounds: Normal heart sounds. No murmur heard.     No friction rub. No gallop.      Comments: R axillary 5.5 impella.   Pulmonary:      Effort: Pulmonary effort is normal. No accessory muscle usage or retractions.      Breath sounds: No wheezing, rhonchi or rales.   Abdominal:      General: Abdomen is flat. Bowel sounds are normal. There is no distension.      Palpations: Abdomen is soft. There is no mass.      Tenderness: There is no abdominal tenderness. There is no guarding.      Hernia: No hernia is present.   Musculoskeletal:      Cervical back: Full passive range of motion without pain.   Skin:     General: Skin is warm and dry.      Capillary Refill: Capillary refill takes less than 2 seconds.      Coloration: Skin is not jaundiced.      Findings: No laceration, rash or wound.   Neurological:      General: No focal deficit present.      Mental Status: She is alert.       Last Recorded Vitals  Blood pressure 118/73, pulse (!) 143, temperature 37 °C (98.6 °F), resp. rate (!) 30, height 1.549 m (5' 0.98\"), weight 93.2 kg (205 lb 7.5 oz), " SpO2 96 %.  Intake/Output last 3 Shifts:  I/O last 3 completed shifts:  In: 1388.9 (14.9 mL/kg) [P.O.:450; I.V.:578.9 (6.2 mL/kg); Blood:360]  Out: 1145 (12.3 mL/kg) [Urine:1130 (0.3 mL/kg/hr); Drains:15]  Weight: 93.2 kg     Relevant Results  Scheduled medications  acetaminophen, 650 mg, oral, Once  aspirin, 81 mg, oral, Daily  [Held by provider] aspirin, 81 mg, oral, Daily  calcitriol, 0.5 mcg, oral, Daily  [START ON 3/6/2024] darbepoetin floyd, 100 mcg, subcutaneous, q14 days  diphenhydrAMINE, 25 mg, oral, Once  ferrous sulfate (325 mg ferrous sulfate), 65 mg of iron, oral, Daily with breakfast  heparin (porcine), 5,000 Units, subcutaneous, q8h  hydrALAZINE, 100 mg, oral, q8h  immune globulin (human), 10 g, intravenous, Once  insulin lispro, 0-10 Units, subcutaneous, Before meals & nightly  iron sucrose, 200 mg, intravenous, Daily  isosorbide dinitrate, 40 mg, oral, q8h  levothyroxine, 250 mcg, oral, Daily  lidocaine, 5 mL, infiltration, Once  lidocaine, 1 patch, transdermal, Daily  melatonin, 10 mg, oral, Nightly  multivitamin with minerals, 1 tablet, oral, Daily  nystatin, 5 mL, Swish & Swallow, q6h  pantoprazole, 40 mg, oral, Daily before breakfast  perflutren protein A microsphere, 0.5 mL, intravenous, Once in imaging  polyethylene glycol, 17 g, oral, BID  predniSONE, 10 mg, oral, Daily  rosuvastatin, 40 mg, oral, Nightly  sennosides-docusate sodium, 2 tablet, oral, BID  sirolimus, 2.5 mg, oral, Daily  sodium chloride, 5 spray, Each Nostril, 4x daily  [Held by provider] sulfamethoxazole-trimethoprim, 80 mg of trimethoprim, oral, Daily  sulfur hexafluoride microsphr, 2 mL, intravenous, Once in imaging  tacrolimus, 3 mg, oral, q12h TRICIA  traZODone, 50 mg, oral, Nightly  valGANciclovir, 450 mg, oral, q48h      Continuous medications  lactated Ringer's, 5 mL/hr, Last Rate: 5 mL/hr (03/05/24 1400)  lactated Ringer's, 10 mL/hr, Last Rate: Stopped (03/03/24 1200)  milrinone, 0.25 mcg/kg/min (Dosing Weight), Last Rate:  0.25 mcg/kg/min (03/05/24 1400)  sodium bicarbonate infusion Impella Purge 25 mEq/1000 mL D5W, 10 mL/hr      PRN medications  PRN medications: acetaminophen, bisacodyl, calcium carbonate, calcium gluconate, calcium gluconate, dextrose, dextrose **OR** glucagon, diphenhydrAMINE, glucagon, hydrALAZINE, artificial tears, magnesium sulfate, magnesium sulfate, microfibrllar collagen, mupirocin, ondansetron, oxyCODONE, oxymetazoline, sodium chloride, sodium chloride    Results for orders placed or performed during the hospital encounter of 02/15/24 (from the past 24 hour(s))   POCT GLUCOSE   Result Value Ref Range    POCT Glucose 177 (H) 74 - 99 mg/dL   Renal function panel   Result Value Ref Range    Glucose 310 (H) 74 - 99 mg/dL    Sodium 123 (L) 136 - 145 mmol/L    Potassium 4.6 3.5 - 5.3 mmol/L    Chloride 89 (L) 98 - 107 mmol/L    Bicarbonate 17 (L) 21 - 32 mmol/L    Anion Gap 22 (H) 10 - 20 mmol/L    Urea Nitrogen 98 (HH) 6 - 23 mg/dL    Creatinine 4.20 (H) 0.50 - 1.05 mg/dL    eGFR 14 (L) >60 mL/min/1.73m*2    Calcium 8.9 8.6 - 10.6 mg/dL    Phosphorus 4.8 2.5 - 4.9 mg/dL    Albumin 3.9 3.4 - 5.0 g/dL   Renal function panel   Result Value Ref Range    Glucose 187 (H) 74 - 99 mg/dL    Sodium 127 (L) 136 - 145 mmol/L    Potassium 4.9 3.5 - 5.3 mmol/L    Chloride 91 (L) 98 - 107 mmol/L    Bicarbonate 18 (L) 21 - 32 mmol/L    Anion Gap 23 (H) 10 - 20 mmol/L    Urea Nitrogen 104 (HH) 6 - 23 mg/dL    Creatinine 4.62 (H) 0.50 - 1.05 mg/dL    eGFR 12 (L) >60 mL/min/1.73m*2    Calcium 9.4 8.6 - 10.6 mg/dL    Phosphorus 5.4 (H) 2.5 - 4.9 mg/dL    Albumin 4.4 3.4 - 5.0 g/dL   POCT GLUCOSE   Result Value Ref Range    POCT Glucose 171 (H) 74 - 99 mg/dL   Tacrolimus level   Result Value Ref Range    Tacrolimus  11.1 <=15.0 ng/mL   Sirolimus level   Result Value Ref Range    Sirolimus  6.6 4.0 - 20.0 ng/mL   Lactate Dehydrogenase   Result Value Ref Range     (H) 84 - 246 U/L   Hepatic function panel   Result Value Ref  Range    Albumin 4.3 3.4 - 5.0 g/dL    Bilirubin, Total 0.9 0.0 - 1.2 mg/dL    Bilirubin, Direct 0.4 (H) 0.0 - 0.3 mg/dL    Alkaline Phosphatase 45 33 - 110 U/L    ALT 5 (L) 7 - 45 U/L    AST 31 9 - 39 U/L    Total Protein 6.3 (L) 6.4 - 8.2 g/dL   Calcium, Ionized   Result Value Ref Range    POCT Calcium, Ionized 1.17 1.1 - 1.33 mmol/L   CBC   Result Value Ref Range    WBC 12.8 (H) 4.4 - 11.3 x10*3/uL    nRBC 0.0 0.0 - 0.0 /100 WBCs    RBC 2.83 (L) 4.00 - 5.20 x10*6/uL    Hemoglobin 8.1 (L) 12.0 - 16.0 g/dL    Hematocrit 23.5 (L) 36.0 - 46.0 %    MCV 83 80 - 100 fL    MCH 28.6 26.0 - 34.0 pg    MCHC 34.5 32.0 - 36.0 g/dL    RDW 14.6 (H) 11.5 - 14.5 %    Platelets 133 (L) 150 - 450 x10*3/uL   Coagulation Screen   Result Value Ref Range    Protime 13.1 (H) 9.8 - 12.8 seconds    INR 1.2 (H) 0.9 - 1.1    aPTT 25 (L) 27 - 38 seconds   Magnesium   Result Value Ref Range    Magnesium 2.42 (H) 1.60 - 2.40 mg/dL   Basic Metabolic Panel   Result Value Ref Range    Glucose 137 (H) 74 - 99 mg/dL    Sodium 128 (L) 136 - 145 mmol/L    Potassium 4.8 3.5 - 5.3 mmol/L    Chloride 90 (L) 98 - 107 mmol/L    Bicarbonate 19 (L) 21 - 32 mmol/L    Anion Gap 24 (H) 10 - 20 mmol/L    Urea Nitrogen 118 (HH) 6 - 23 mg/dL    Creatinine 5.03 (H) 0.50 - 1.05 mg/dL    eGFR 11 (L) >60 mL/min/1.73m*2    Calcium 9.4 8.6 - 10.6 mg/dL   Phosphorus   Result Value Ref Range    Phosphorus 6.1 (H) 2.5 - 4.9 mg/dL   POCT GLUCOSE   Result Value Ref Range    POCT Glucose 144 (H) 74 - 99 mg/dL   Prepare Plasma Exchange: 11 Units   Result Value Ref Range    PRODUCT CODE O6525J69     Unit Number [id number]     Unit ABO O     Unit RH POS     Dispense Status RE     Blood Expiration Date March 10, 2024 10:15 EDT     PRODUCT BLOOD TYPE 5100     UNIT VOLUME 208     PRODUCT CODE B6851T22     Unit Number [id number]     Unit ABO O     Unit RH POS     Dispense Status IS     Blood Expiration Date March 10, 2024 10:15 EDT     PRODUCT BLOOD TYPE 5100     UNIT  VOLUME 324     PRODUCT CODE K5037U16     Unit Number [id number]     Unit ABO O     Unit RH POS     Dispense Status IS     Blood Expiration Date March 10, 2024 10:15 EDT     PRODUCT BLOOD TYPE 5100     UNIT VOLUME 357     PRODUCT CODE P8358H24     Unit Number [id number]     Unit ABO O     Unit RH POS     Dispense Status IS     Blood Expiration Date March 10, 2024 10:15 EDT     PRODUCT BLOOD TYPE 5100     UNIT VOLUME 302     PRODUCT CODE [id number]     Unit Number [id number]     Unit ABO O     Unit RH POS     Dispense Status IS     Blood Expiration Date March 10, 2024 10:15 EDT     PRODUCT BLOOD TYPE 5100     UNIT VOLUME 359     PRODUCT CODE E8445T05     Unit Number [id number]     Unit ABO O     Unit RH POS     Dispense Status IS     Blood Expiration Date March 10, 2024 10:15 EDT     PRODUCT BLOOD TYPE 5100     UNIT VOLUME 325     PRODUCT CODE [id number]     Unit Number [id number]     Unit ABO O     Unit RH POS     Dispense Status IS     Blood Expiration Date March 10, 2024 10:15 EDT     PRODUCT BLOOD TYPE 5100     UNIT VOLUME 283     PRODUCT CODE [id number]     Unit Number [id number]     Unit ABO O     Unit RH POS     Dispense Status IS     Blood Expiration Date March 10, 2024 10:15 EDT     PRODUCT BLOOD TYPE 5100     UNIT VOLUME 340     PRODUCT CODE P7065E99     Unit Number [id number]     Unit ABO O     Unit RH POS     Dispense Status IS     Blood Expiration Date March 10, 2024 10:15 EDT     PRODUCT BLOOD TYPE 5100     UNIT VOLUME 321     PRODUCT CODE [id number]     Unit Number [id number]     Unit ABO O     Unit RH POS     Dispense Status IS     Blood Expiration Date March 10, 2024 10:15 EDT     PRODUCT BLOOD TYPE 5100     UNIT VOLUME 327    Type and Screen   Result Value Ref Range    ABO TYPE O     Rh TYPE POS     ANTIBODY SCREEN NEG    CBC   Result Value Ref Range    WBC 12.1 (H) 4.4 - 11.3 x10*3/uL    nRBC 0.0 0.0 - 0.0 /100 WBCs    RBC 2.77 (L) 4.00 - 5.20 x10*6/uL    Hemoglobin  8.1 (L) 12.0 - 16.0 g/dL    Hematocrit 24.2 (L) 36.0 - 46.0 %    MCV 87 80 - 100 fL    MCH 29.2 26.0 - 34.0 pg    MCHC 33.5 32.0 - 36.0 g/dL    RDW 14.9 (H) 11.5 - 14.5 %    Platelets 140 (L) 150 - 450 x10*3/uL   Renal function panel   Result Value Ref Range    Glucose 156 (H) 74 - 99 mg/dL    Sodium 127 (L) 136 - 145 mmol/L    Potassium 4.7 3.5 - 5.3 mmol/L    Chloride 89 (L) 98 - 107 mmol/L    Bicarbonate 19 (L) 21 - 32 mmol/L    Anion Gap 24 (H) 10 - 20 mmol/L    Urea Nitrogen 121 (HH) 6 - 23 mg/dL    Creatinine 5.21 (H) 0.50 - 1.05 mg/dL    eGFR 11 (L) >60 mL/min/1.73m*2    Calcium 9.5 8.6 - 10.6 mg/dL    Phosphorus 6.3 (H) 2.5 - 4.9 mg/dL    Albumin 4.2 3.4 - 5.0 g/dL   Magnesium   Result Value Ref Range    Magnesium 2.47 (H) 1.60 - 2.40 mg/dL   Coagulation Screen   Result Value Ref Range    Protime 13.3 (H) 9.8 - 12.8 seconds    INR 1.2 (H) 0.9 - 1.1    aPTT 28 27 - 38 seconds   Calcium, Ionized   Result Value Ref Range    POCT Calcium, Ionized 1.15 1.1 - 1.33 mmol/L   CBC   Result Value Ref Range    WBC 13.2 (H) 4.4 - 11.3 x10*3/uL    nRBC 0.2 (H) 0.0 - 0.0 /100 WBCs    RBC 2.92 (L) 4.00 - 5.20 x10*6/uL    Hemoglobin 8.7 (L) 12.0 - 16.0 g/dL    Hematocrit 24.9 (L) 36.0 - 46.0 %    MCV 85 80 - 100 fL    MCH 29.8 26.0 - 34.0 pg    MCHC 34.9 32.0 - 36.0 g/dL    RDW 14.8 (H) 11.5 - 14.5 %    Platelets 148 (L) 150 - 450 x10*3/uL     *Note: Due to a large number of results and/or encounters for the requested time period, some results have not been displayed. A complete set of results can be found in Results Review.         Assessment/Plan   Assessment: Ms. Yimi Bowles is a 33F with a PMHx sig for stage D HFrEF (PPCM) s/p ICD s/p CardioMEMs, hypothyroidism 2/2 thyroidectomy, obesity s/p gastric bypass (2017), and SLE who is s/p OHT (3/31/2022) with a post-OHT course complicated by RIJ/RUE DVTs, leukopenia, left groin seroma, and asymptomatic 2R ACR (11/2022) treated with steroids, s/p total hysterectomy (2023), Flu  STEPHANIE (1/2024).    Originally presented to [facility] ED on 2/15/24 with complaints of N/V x 3 days and inability to keep medications down. POCUS revealed acute systolic heart failure w/ severe BIV dysfunction when compared to previous images in 11/2023. Also noted to have HIRAM requiring CVVH and transaminitis. Immunosuppression notably below therapeutic range. Admitted to HFICU and treated for suspected acute cellular vs. acute antibody mediated rejection; however, cardiac biopsy negative for signs of rejection. Despite rejection therapy, inotropes and MCS via IABP (2/18/24), the patient's end organ function continued to worsen without improvement in cardiac function. Ultimately placed on left femoral VA ECMO w/ Dr. Marina on 2/19/2024 and transferred to CTICU.     CTICU course complicated by anemia requiring blood product transfusions, epistaxis, volume overload & intubation (tachypnea and increased work of breathing 2/2 pulmonary edema). Status post extubation on 2/24/24. Now showing renal recovery off CVVH and cardiac allograft recovery s/p ECMO decannulation on 2/29/24 w/ Dr. Witt.     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)  Toxo: -/-   Hep C: -/-    Rejection/Prophylaxis (transplants):  - Steroids: Continue 10mg PO prednisone daily  - Tacrolimus: 3.0mg PO @ 0630 & 3.0mg PO @ 1830 w/ daily levels drawn @ 0600  - Serolimus: 2.5mg PO daily w/ daily levels drawn at 0600  - Tacrolimus goal troughs: 3-5  - Serolimus goal troughs: 7-9  - Combined sirolimus/tacrolimus goal of 10-12  - Antifungals: nystatin 500,000units Q6  - Antivirals: continue valcyte 450mg Q48hrs due to low grade viremia   -  Anti PCP & Toxoplasmosis: Bactrim SS daily  --> Holding due to thrombocytopenia (2/23)    Last cardiac biopsy: 2/16/24 with ACR1 and no AMR  Last HLA: 2/16/24 negative for DSAs   Last RHC: 2/16/24 w/ RA 11, PAP 34/14(23), W 19 and C.I. 2.3  Last LHC: 2/18/24 negative for CAV and CAD   Last TTE/BOB: 3/1/24 shows LVEF to 30% and mild RV dysfunction (intra-op impella 5.5 insert)  Osteopenia/osteoporosis prophylaxis: Vitamin D3 and calcium supplements  Peptic/gastric ulcer prophylaxis: Pantoprazole 40mg daily   CAV Prophylaxis: Aspirin 81mg daily & pravastatin daily    - Unclear cause of acute severe graft dysfunction. Most likely smoldering ACR vs. AMR; however, 2xallograft biopsies on 2/16 & 2/20 remain negative for any significant pathology. Notably, both biopsies taken after rejection therapies implemented which may have reduced areas of graft damage.    - DSAs remain negative; however, patient may have non-HLA antibodies present. 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   - Negative CAV & CAD via LHC on 2/18/24   - Completed methylpred steroid pulse w/ 1g Q24 x 3 days (2/16-2/18) and prednisone taper   - Thymoglobulin doses: 2/18 & 2/19   - IVIG + PLEX Session: 2/18 & 2/20   - Extubated on 3/2/24.  - CVVH stopped on 2/27/24. HIRAM previously improving and autodiuresing; however, since ECMO decannulation, UOP has decreased and creatinine uptrending   - Graft function slightly worse after transition to impella 5.5. IABP removed 3/1/24.  - On Milrinone 0.25mcg/kg/min. Impella up to P9. SGC removed 3/2/24. Team is trying to liberate from lines as able.     Recommendations:  - Cancel RHC with [facility]. Discussed with cath lab personnel to cancel the case request.   - Will start full regimen of IVIG + plasmapheresis.   - Will likely need dialysis.   - Will obtain consent for advanced therapy evaluation (dual heart/kidney transplant). Official notification email sent to the Transplant group on 03/05.   - Once consent  has been signed, will send Advanced therapy work-up for OHT.   - C/w Tacrolimus 3 mg po bid. Target tacrolimus trough level: 3-5  - C/w Sirolimus 2.5 mg po daily. Target sirolimus trough level: 7-9  - C/w Prednisone 10 mg po every day.   - Continue valcyte 450mg Q48hrs and continue to check CMV PCRs weekly (next due on 3/8/24)     Patient was examined and discussed with HF attending, Dr. DENNIS Price.     Nasir Piper MD, PGY-4  Advanced Heart Failure & Transplant Fellow

## 2024-03-05 NOTE — PROGRESS NOTES
"Charla Bowles is a 33 y.o. female on day 19 of admission presenting with Cardiogenic shock (CMS/HCC).    Subjective:L  Got lasix 40 mg x 2 yesterday and 60 mg IV x 1, total urine OP of 1.1 L. Was net negative 300 ml.  Chest xray was clear, on milrinone alone. Planned for PLEX and IVIG today and trialysis placement. No acut eveneets, no SOB or leg swelling. Impella in palce    Last Recorded Vitals  Blood pressure 118/73, pulse (!) 143, temperature 37 °C (98.6 °F), resp. rate (!) 30, height 1.549 m (5' 0.98\"), weight 93.2 kg (205 lb 7.5 oz), SpO2 96 %.  Intake/Output last 3 Shifts:  I/O last 3 completed shifts:  In: 1388.9 (14.9 mL/kg) [P.O.:450; I.V.:578.9 (6.2 mL/kg); Blood:360]  Out: 1145 (12.3 mL/kg) [Urine:1130 (0.3 mL/kg/hr); Drains:15]  Weight: 93.2 kg     General: No distress   CVS: S1 S2 no murmurs  RESP:  Lungs CTAB on RA  ABDO: Soft, non-tender   Neuro: A + O x 3  Skin: No rash   Extremities: No edema   Impella in place  No HD access     Labs:  Results for orders placed or performed during the hospital encounter of 02/15/24 (from the past 24 hour(s))   POCT GLUCOSE   Result Value Ref Range    POCT Glucose 177 (H) 74 - 99 mg/dL   Renal function panel   Result Value Ref Range    Glucose 310 (H) 74 - 99 mg/dL    Sodium 123 (L) 136 - 145 mmol/L    Potassium 4.6 3.5 - 5.3 mmol/L    Chloride 89 (L) 98 - 107 mmol/L    Bicarbonate 17 (L) 21 - 32 mmol/L    Anion Gap 22 (H) 10 - 20 mmol/L    Urea Nitrogen 98 (HH) 6 - 23 mg/dL    Creatinine 4.20 (H) 0.50 - 1.05 mg/dL    eGFR 14 (L) >60 mL/min/1.73m*2    Calcium 8.9 8.6 - 10.6 mg/dL    Phosphorus 4.8 2.5 - 4.9 mg/dL    Albumin 3.9 3.4 - 5.0 g/dL   Renal function panel   Result Value Ref Range    Glucose 187 (H) 74 - 99 mg/dL    Sodium 127 (L) 136 - 145 mmol/L    Potassium 4.9 3.5 - 5.3 mmol/L    Chloride 91 (L) 98 - 107 mmol/L    Bicarbonate 18 (L) 21 - 32 mmol/L    Anion Gap 23 (H) 10 - 20 mmol/L    Urea Nitrogen 104 (HH) 6 - 23 mg/dL    Creatinine 4.62 (H) " 0.50 - 1.05 mg/dL    eGFR 12 (L) >60 mL/min/1.73m*2    Calcium 9.4 8.6 - 10.6 mg/dL    Phosphorus 5.4 (H) 2.5 - 4.9 mg/dL    Albumin 4.4 3.4 - 5.0 g/dL   POCT GLUCOSE   Result Value Ref Range    POCT Glucose 171 (H) 74 - 99 mg/dL   Tacrolimus level   Result Value Ref Range    Tacrolimus  11.1 <=15.0 ng/mL   Sirolimus level   Result Value Ref Range    Sirolimus  6.6 4.0 - 20.0 ng/mL   Lactate Dehydrogenase   Result Value Ref Range     (H) 84 - 246 U/L   Hepatic function panel   Result Value Ref Range    Albumin 4.3 3.4 - 5.0 g/dL    Bilirubin, Total 0.9 0.0 - 1.2 mg/dL    Bilirubin, Direct 0.4 (H) 0.0 - 0.3 mg/dL    Alkaline Phosphatase 45 33 - 110 U/L    ALT 5 (L) 7 - 45 U/L    AST 31 9 - 39 U/L    Total Protein 6.3 (L) 6.4 - 8.2 g/dL   Calcium, Ionized   Result Value Ref Range    POCT Calcium, Ionized 1.17 1.1 - 1.33 mmol/L   CBC   Result Value Ref Range    WBC 12.8 (H) 4.4 - 11.3 x10*3/uL    nRBC 0.0 0.0 - 0.0 /100 WBCs    RBC 2.83 (L) 4.00 - 5.20 x10*6/uL    Hemoglobin 8.1 (L) 12.0 - 16.0 g/dL    Hematocrit 23.5 (L) 36.0 - 46.0 %    MCV 83 80 - 100 fL    MCH 28.6 26.0 - 34.0 pg    MCHC 34.5 32.0 - 36.0 g/dL    RDW 14.6 (H) 11.5 - 14.5 %    Platelets 133 (L) 150 - 450 x10*3/uL   Coagulation Screen   Result Value Ref Range    Protime 13.1 (H) 9.8 - 12.8 seconds    INR 1.2 (H) 0.9 - 1.1    aPTT 25 (L) 27 - 38 seconds   Magnesium   Result Value Ref Range    Magnesium 2.42 (H) 1.60 - 2.40 mg/dL   Basic Metabolic Panel   Result Value Ref Range    Glucose 137 (H) 74 - 99 mg/dL    Sodium 128 (L) 136 - 145 mmol/L    Potassium 4.8 3.5 - 5.3 mmol/L    Chloride 90 (L) 98 - 107 mmol/L    Bicarbonate 19 (L) 21 - 32 mmol/L    Anion Gap 24 (H) 10 - 20 mmol/L    Urea Nitrogen 118 (HH) 6 - 23 mg/dL    Creatinine 5.03 (H) 0.50 - 1.05 mg/dL    eGFR 11 (L) >60 mL/min/1.73m*2    Calcium 9.4 8.6 - 10.6 mg/dL   Phosphorus   Result Value Ref Range    Phosphorus 6.1 (H) 2.5 - 4.9 mg/dL   POCT GLUCOSE   Result Value Ref Range     POCT Glucose 144 (H) 74 - 99 mg/dL   Prepare Plasma Exchange: 11 Units   Result Value Ref Range    PRODUCT CODE Z9883M63     Unit Number [id number]     Unit ABO O     Unit RH POS     Dispense Status IS     Blood Expiration Date March 10, 2024 10:15 EDT     PRODUCT BLOOD TYPE 5100     UNIT VOLUME 208     PRODUCT CODE E7229V07     Unit Number [id number]     Unit ABO O     Unit RH POS     Dispense Status IS     Blood Expiration Date March 10, 2024 10:15 EDT     PRODUCT BLOOD TYPE 5100     UNIT VOLUME 324     PRODUCT CODE L5430J70     Unit Number [id number]     Unit ABO O     Unit RH POS     Dispense Status IS     Blood Expiration Date March 10, 2024 10:15 EDT     PRODUCT BLOOD TYPE 5100     UNIT VOLUME 357     PRODUCT CODE M4050F24     Unit Number [id number]     Unit ABO O     Unit RH POS     Dispense Status IS     Blood Expiration Date March 10, 2024 10:15 EDT     PRODUCT BLOOD TYPE 5100     UNIT VOLUME 302     PRODUCT CODE Y0986M40     Unit Number [id number]     Unit ABO O     Unit RH POS     Dispense Status IS     Blood Expiration Date March 10, 2024 10:15 EDT     PRODUCT BLOOD TYPE 5100     UNIT VOLUME 359     PRODUCT CODE C6522F20     Unit Number [id number]     Unit ABO O     Unit RH POS     Dispense Status IS     Blood Expiration Date March 10, 2024 10:15 EDT     PRODUCT BLOOD TYPE 5100     UNIT VOLUME 325     PRODUCT CODE M5391G99     Unit Number [id number]     Unit ABO O     Unit RH POS     Dispense Status IS     Blood Expiration Date March 10, 2024 10:15 EDT     PRODUCT BLOOD TYPE 5100     UNIT VOLUME 283     PRODUCT CODE M9915J49     Unit Number [id number]     Unit ABO O     Unit RH POS     Dispense Status IS     Blood Expiration Date March 10, 2024 10:15 EDT     PRODUCT BLOOD TYPE 5100     UNIT VOLUME 340     PRODUCT CODE A9593Y19     Unit Number [id number]     Unit ABO O     Unit RH POS     Dispense Status IS     Blood Expiration Date March 10, 2024 10:15 EDT      "PRODUCT BLOOD TYPE 5100     UNIT VOLUME 321     PRODUCT CODE B9740B84     Unit Number [id number]     Unit ABO O     Unit RH POS     Dispense Status IS     Blood Expiration Date March 10, 2024 10:15 EDT     PRODUCT BLOOD TYPE 5100     UNIT VOLUME 327    Type and Screen   Result Value Ref Range    ABO TYPE O     Rh TYPE POS     ANTIBODY SCREEN NEG    CBC   Result Value Ref Range    WBC 12.1 (H) 4.4 - 11.3 x10*3/uL    nRBC 0.0 0.0 - 0.0 /100 WBCs    RBC 2.77 (L) 4.00 - 5.20 x10*6/uL    Hemoglobin 8.1 (L) 12.0 - 16.0 g/dL    Hematocrit 24.2 (L) 36.0 - 46.0 %    MCV 87 80 - 100 fL    MCH 29.2 26.0 - 34.0 pg    MCHC 33.5 32.0 - 36.0 g/dL    RDW 14.9 (H) 11.5 - 14.5 %    Platelets 140 (L) 150 - 450 x10*3/uL   Renal function panel   Result Value Ref Range    Glucose 156 (H) 74 - 99 mg/dL    Sodium 127 (L) 136 - 145 mmol/L    Potassium 4.7 3.5 - 5.3 mmol/L    Chloride 89 (L) 98 - 107 mmol/L    Bicarbonate 19 (L) 21 - 32 mmol/L    Anion Gap 24 (H) 10 - 20 mmol/L    Urea Nitrogen 121 (HH) 6 - 23 mg/dL    Creatinine 5.21 (H) 0.50 - 1.05 mg/dL    eGFR 11 (L) >60 mL/min/1.73m*2    Calcium 9.5 8.6 - 10.6 mg/dL    Phosphorus 6.3 (H) 2.5 - 4.9 mg/dL    Albumin 4.2 3.4 - 5.0 g/dL   Magnesium   Result Value Ref Range    Magnesium 2.47 (H) 1.60 - 2.40 mg/dL   Coagulation Screen   Result Value Ref Range    Protime 13.3 (H) 9.8 - 12.8 seconds    INR 1.2 (H) 0.9 - 1.1    aPTT 28 27 - 38 seconds   Calcium, Ionized   Result Value Ref Range    POCT Calcium, Ionized 1.15 1.1 - 1.33 mmol/L     *Note: Due to a large number of results and/or encounters for the requested time period, some results have not been displayed. A complete set of results can be found in Results Review.         Assessment/Plan     Charla Bowles is a 33 y.o. with a history of orthotic heart transplant as \"March 2022 with postoperative course complicated by upper extremity DVT, asymptomatic 2R rejection in November 2022 who currently got admitted with nausea " vomiting and markedly reduced ejection fraction 35%, low cardiac index with elevated filling pressures concerning for cardiogenic shock.       HIRAM on CKD stage 3: (baseline Cr 1.2)   -HIRMA in the setting of CRS, cardiogenic shock.  Started on CRRT on 2/19/2024 for volume management. Discontinued 2/27.  Clearance remains impaired   - nonoliguric, with IV diuretics  - volume status: euvolemic to mild hypervolemic on exam, mildly elevated RVSP to 36 on TTE. Goal per team to keep slight negative to even  - wheatley in place, negative 300 ml over last 24 hours    Plan:  - plan for trialysis placement and HD this evening after PLEX completed. No UF planned.   - continue diuresis Prn with goal to maintain euvolemia  - continue strict Is/Os    # anemia:   -  recommend venofer 200 mg IV daily for 5 days  - will start aranesp q 2 weeks    #BMD:   - calcium WNL and phos elevated     # Immunosuppression:   As per the heart transplant team     # electrolytes  - hyponatremia secondary to impaired free water clearance from kidney injury     Cardiogenic shock  -S/p endomyocardial biopsies on 2/16 and 2/20 without definitive findings but pt treated for presumed rejection.  DSA negative so far. S/p pulse methylprednisolone 1 g for 3 days from 2/16- 2/18, Thymoglobulin -2 doses given on 2/18 and 2/19, IV immunoglobulin plus PLEX sessions done on 2/18 and 2/20.  - planned for endometrial biopsy 3/6  -s/p [facility] ECMO and on IABP support till 3/1/24. Currently with Impella 3/1/24.  - planned for IVIG and PLEX    Kuldip Hernandes MD  Nephrology fellow

## 2024-03-05 NOTE — POST-PROCEDURE NOTE
This is a 33 y.o. 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     I saw and evaluated the patient during the TPE.  The patient was resting in bed.  The vascular access functioned well.     Pre-procedure labs:  WBC   Date/Time Value Ref Range Status   03/05/2024 11:39 AM 12.1 (H) 4.4 - 11.3 x10*3/uL Final     Hemoglobin   Date/Time Value Ref Range Status   03/05/2024 11:39 AM 8.1 (L) 12.0 - 16.0 g/dL Final     Hematocrit   Date/Time Value Ref Range Status   03/05/2024 11:39 AM 24.2 (L) 36.0 - 46.0 % Final     Platelets   Date/Time Value Ref Range Status   03/05/2024 11:39  (L) 150 - 450 x10*3/uL Final        POCT Calcium, Ionized   Date/Time Value Ref Range Status   03/05/2024 11:39 AM 1.15 1.1 - 1.33 mmol/L Final     Comment:     The performance characteristics of ionized calcium tested  in heparinized plasma or serum have been validated by the  individual  laboratory site where testing is performed.   Testing on heparinized plasma or serum is not approved by   the FDA; however, such approval is not necessary.        Protime   Date/Time Value Ref Range Status   03/05/2024 11:39 AM 13.3 (H) 9.8 - 12.8 seconds Final     INR   Date/Time Value Ref Range Status   03/05/2024 11:39 AM 1.2 (H) 0.9 - 1.1 Final     aPTT   Date/Time Value Ref Range Status   03/05/2024 11:39 AM 28 27 - 38 seconds Final     Fibrinogen   Date/Time Value Ref Range Status   02/29/2024 01:30  200 - 400 mg/dL Final        Pre-procedure vital signs at 1141:  T: 36.6 C, HR: 138, RR: 30, /86  Pulse oximetry: 96% on room air    Post-procedure vital signs at 1435:  T: 37 C, HR: 143, RR: 30, BP: 118/73  Pulse oximetry: 96% on room air       Outcome: TPE completed.   Adverse Reaction: No    Assessment and Plan:  33 y.o. 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  Draw post-procedure labs  Vascular access to be managed by clinical team.  Next TPE #2/5 is schedule for 3/7/24.

## 2024-03-05 NOTE — PROGRESS NOTES
Palliative Medicine following for:  Complex medical decision making, symptom management, patient/family support    History obtained from chart review including ED note, H&P, patient's daily progress notes, review of lab/test results, and discussion with primary team and bedside RN.    Subjective    History of Present Illness  Charla Bowles is a 33-year-old woman with history of Stage D HFrEF (PPCM status post ICD, CardioMems) status post OHT (3/31/2022), hypothyroidism status post thyroidectomy, obesity status post gastric bypass procedure (2017), SLE who is admitted for cardiogenic shock suspected secondary to acute cellular vs acute antibody mediated rejection, requiring VA-ECMO support from 2/19-2/29 and IABP support removed 3/1. She has been treated with aggressive immunosuppression therapies for suspected rejection, although biopsies of graft have been negative. Her course has been complicated by acute renal failure requiring dialysis, acute anemia requiring multiple blood transfusions, pulmonary edema requiring mechanical ventilatory support.     She will continue to receive aggressive immunosuppression and is planning to be evaluated for advanced therapies candidacy.     Her mother is at bedside, both the patient and her mom express frustration with changes in medical condition and plan frequently, but both the patient her mom acknowledge and understand that dynamic changes are expected given the nature of her serious illness.     Palliative Medicine service offered as additional layer of support at this time, patient and her mom acknowledge the services provided by palliative medicine service, endorse no additional needs at this time.       Symptoms  Pain: Denies  Dyspnea: Denies  Fatigue: Denies  Insomnia: Improving  Drowsiness: Denies  Constipation: Denies  Nausea: Denies  Appetite: Adequate  Anxiety: Denies  Depression: Denies      Objective    Last Recorded Vitals  /85   Pulse (!) 137   Temp 36.4  "°C (97.5 °F) (Temporal)   Resp 22   Ht 1.549 m (5' 0.98\")   Wt 93.2 kg (205 lb 7.5 oz)   SpO2 98%   BMI 38.84 kg/m²      Physical Exam  Constitutional:       General: She is awake. She is not in acute distress.     Appearance: She is ill-appearing.   HENT:      Head: Normocephalic and atraumatic.   Eyes:      Extraocular Movements: Extraocular movements intact.   Cardiovascular:      Rate and Rhythm: Regular rhythm. Tachycardia present.      Heart sounds: S1 normal and S2 normal.      Comments: Impella 5.5 in place with 5.1 L/min flow  Pulmonary:      Effort: No tachypnea.      Breath sounds: Decreased breath sounds present.   Skin:     General: Skin is warm and dry.   Neurological:      Mental Status: She is alert and oriented to person, place, and time.      Cranial Nerves: Cranial nerves 2-12 are intact.   Psychiatric:         Behavior: Behavior is cooperative.          Relevant Results   Results for orders placed or performed during the hospital encounter of 02/15/24 (from the past 24 hour(s))   Transthoracic Echo (TTE) Limited   Result Value Ref Range    AV pk reji 1.75 m/s    LV biplane EF 34 %    RV free wall pk S' 7.62 cm/s    LVIDd 4.60 cm    RVSP 35.9 mmHg    AV pk grad 12.3 mmHg    LV A4C EF 25.7    T4, free   Result Value Ref Range    Thyroxine, Free 0.97 0.78 - 1.48 ng/dL   T3, free   Result Value Ref Range    Triiodothyronine, Free 1.1 (L) 2.3 - 4.2 pg/mL   Magnesium   Result Value Ref Range    Magnesium 2.14 1.60 - 2.40 mg/dL   CBC   Result Value Ref Range    WBC 15.2 (H) 4.4 - 11.3 x10*3/uL    nRBC 0.0 0.0 - 0.0 /100 WBCs    RBC 3.01 (L) 4.00 - 5.20 x10*6/uL    Hemoglobin 8.9 (L) 12.0 - 16.0 g/dL    Hematocrit 24.6 (L) 36.0 - 46.0 %    MCV 82 80 - 100 fL    MCH 29.6 26.0 - 34.0 pg    MCHC 36.2 (H) 32.0 - 36.0 g/dL    RDW 14.2 11.5 - 14.5 %    Platelets 119 (L) 150 - 450 x10*3/uL   Calcium, Ionized   Result Value Ref Range    POCT Calcium, Ionized 1.09 (L) 1.1 - 1.33 mmol/L   Renal function panel "   Result Value Ref Range    Glucose 777 (HH) 74 - 99 mg/dL    Sodium 112 (LL) 136 - 145 mmol/L    Potassium 3.8 3.5 - 5.3 mmol/L    Chloride 80 (L) 98 - 107 mmol/L    Bicarbonate 18 (L) 21 - 32 mmol/L    Anion Gap 18 mmol/L    Urea Nitrogen 90 (H) 6 - 23 mg/dL    Creatinine 3.81 (H) 0.50 - 1.05 mg/dL    eGFR 15 (L) >60 mL/min/1.73m*2    Calcium 8.3 (L) 8.6 - 10.6 mg/dL    Phosphorus 4.5 2.5 - 4.9 mg/dL    Albumin 3.8 3.4 - 5.0 g/dL   Coagulation Screen   Result Value Ref Range    Protime 13.5 (H) 9.8 - 12.8 seconds    INR 1.2 (H) 0.9 - 1.1    aPTT 26 (L) 27 - 38 seconds   BLOOD GAS VENOUS FULL PANEL   Result Value Ref Range    POCT pH, Venous 7.37 7.33 - 7.43 pH    POCT pCO2, Venous 34 (L) 41 - 51 mm Hg    POCT pO2, Venous 40 35 - 45 mm Hg    POCT SO2, Venous 65 45 - 75 %    POCT Oxy Hemoglobin, Venous 63.2 45.0 - 75.0 %    POCT Hematocrit Calculated, Venous 29.0 (L) 36.0 - 46.0 %    POCT Sodium, Venous 125 (L) 136 - 145 mmol/L    POCT Potassium, Venous 4.5 3.5 - 5.3 mmol/L    POCT Chloride, Venous 93 (L) 98 - 107 mmol/L    POCT Ionized Calicum, Venous 1.15 1.10 - 1.33 mmol/L    POCT Glucose, Venous 168 (H) 74 - 99 mg/dL    POCT Lactate, Venous 1.4 0.4 - 2.0 mmol/L    POCT Base Excess, Venous -5.0 (L) -2.0 - 3.0 mmol/L    POCT HCO3 Calculated, Venous 19.7 (L) 22.0 - 26.0 mmol/L    POCT Hemoglobin, Venous 9.5 (L) 12.0 - 16.0 g/dL    POCT Anion Gap, Venous 17.0 10.0 - 25.0 mmol/L    Patient Temperature 37.0 degrees Celsius    FiO2 21 %   POCT GLUCOSE   Result Value Ref Range    POCT Glucose 175 (H) 74 - 99 mg/dL   POCT GLUCOSE   Result Value Ref Range    POCT Glucose 177 (H) 74 - 99 mg/dL   Renal function panel   Result Value Ref Range    Glucose 310 (H) 74 - 99 mg/dL    Sodium 123 (L) 136 - 145 mmol/L    Potassium 4.6 3.5 - 5.3 mmol/L    Chloride 89 (L) 98 - 107 mmol/L    Bicarbonate 17 (L) 21 - 32 mmol/L    Anion Gap 22 (H) 10 - 20 mmol/L    Urea Nitrogen 98 (HH) 6 - 23 mg/dL    Creatinine 4.20 (H) 0.50 - 1.05 mg/dL     eGFR 14 (L) >60 mL/min/1.73m*2    Calcium 8.9 8.6 - 10.6 mg/dL    Phosphorus 4.8 2.5 - 4.9 mg/dL    Albumin 3.9 3.4 - 5.0 g/dL   Renal function panel   Result Value Ref Range    Glucose 187 (H) 74 - 99 mg/dL    Sodium 127 (L) 136 - 145 mmol/L    Potassium 4.9 3.5 - 5.3 mmol/L    Chloride 91 (L) 98 - 107 mmol/L    Bicarbonate 18 (L) 21 - 32 mmol/L    Anion Gap 23 (H) 10 - 20 mmol/L    Urea Nitrogen 104 (HH) 6 - 23 mg/dL    Creatinine 4.62 (H) 0.50 - 1.05 mg/dL    eGFR 12 (L) >60 mL/min/1.73m*2    Calcium 9.4 8.6 - 10.6 mg/dL    Phosphorus 5.4 (H) 2.5 - 4.9 mg/dL    Albumin 4.4 3.4 - 5.0 g/dL   POCT GLUCOSE   Result Value Ref Range    POCT Glucose 171 (H) 74 - 99 mg/dL   Lactate Dehydrogenase   Result Value Ref Range     (H) 84 - 246 U/L   Hepatic function panel   Result Value Ref Range    Albumin 4.3 3.4 - 5.0 g/dL    Bilirubin, Total 0.9 0.0 - 1.2 mg/dL    Bilirubin, Direct 0.4 (H) 0.0 - 0.3 mg/dL    Alkaline Phosphatase 45 33 - 110 U/L    ALT 5 (L) 7 - 45 U/L    AST 31 9 - 39 U/L    Total Protein 6.3 (L) 6.4 - 8.2 g/dL   Calcium, Ionized   Result Value Ref Range    POCT Calcium, Ionized 1.17 1.1 - 1.33 mmol/L   CBC   Result Value Ref Range    WBC 12.8 (H) 4.4 - 11.3 x10*3/uL    nRBC 0.0 0.0 - 0.0 /100 WBCs    RBC 2.83 (L) 4.00 - 5.20 x10*6/uL    Hemoglobin 8.1 (L) 12.0 - 16.0 g/dL    Hematocrit 23.5 (L) 36.0 - 46.0 %    MCV 83 80 - 100 fL    MCH 28.6 26.0 - 34.0 pg    MCHC 34.5 32.0 - 36.0 g/dL    RDW 14.6 (H) 11.5 - 14.5 %    Platelets 133 (L) 150 - 450 x10*3/uL   Coagulation Screen   Result Value Ref Range    Protime 13.1 (H) 9.8 - 12.8 seconds    INR 1.2 (H) 0.9 - 1.1    aPTT 25 (L) 27 - 38 seconds   Magnesium   Result Value Ref Range    Magnesium 2.42 (H) 1.60 - 2.40 mg/dL   Basic Metabolic Panel   Result Value Ref Range    Glucose 137 (H) 74 - 99 mg/dL    Sodium 128 (L) 136 - 145 mmol/L    Potassium 4.8 3.5 - 5.3 mmol/L    Chloride 90 (L) 98 - 107 mmol/L    Bicarbonate 19 (L) 21 - 32 mmol/L    Anion  Gap 24 (H) 10 - 20 mmol/L    Urea Nitrogen 118 (HH) 6 - 23 mg/dL    Creatinine 5.03 (H) 0.50 - 1.05 mg/dL    eGFR 11 (L) >60 mL/min/1.73m*2    Calcium 9.4 8.6 - 10.6 mg/dL   Phosphorus   Result Value Ref Range    Phosphorus 6.1 (H) 2.5 - 4.9 mg/dL   POCT GLUCOSE   Result Value Ref Range    POCT Glucose 144 (H) 74 - 99 mg/dL     *Note: Due to a large number of results and/or encounters for the requested time period, some results have not been displayed. A complete set of results can be found in Results Review.      Transthoracic Echo (TTE) Limited     [facility], [address] [place] [address]                 Tel [phone] and Fax [phone]    TRANSTHORACIC ECHOCARDIOGRAM REPORT       Patient Name:      MGIUEL Camara Physician:    [id number] Param BASS MD  Study Date:        3/4/2024             Ordering Provider:    [id number] ELENA QUINTERO  MRN/PID:           [id number]             Fellow:  Accession#:        [id number]         Nurse:  Date of Birth/Age: 1990 / 33 years  Sonographer:          Adryan Meredith RDCS  Gender:            F                    Additional Staff:  Height:            154.94 cm            Admit Date:           2/15/2024  Weight:            90.27 kg             Admission Status:     Inpatient -                                                                Routine  BSA / BMI:         1.89 m2 / 37.60      Encounter#:           [id number]                     kg/m2                                          Department Location:  [facility]  Blood Pressure: 127 /84 mmHg    Study Type:    TRANSTHORACIC ECHO (TTE) LIMITED  Diagnosis/ICD: Cardiogenic shock-R57.0  Indication:    shock  CPT Code:      Echo Limited-91235; Doppler Limited-57739; Color  Doppler-28229    Patient History:  Pertinent History: 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    Study Detail: The following Echo studies were performed: 2D, M-Mode, Doppler and                color flow. Technically challenging study due to body habitus,                patient lying in supine position, poor acoustic windows, prominent                lung artifact and postoperative dressings. Definity used as a                contrast agent for endocardial border definition. Total contrast                used for this procedure was 2.0 mL via IV push.       PHYSICIAN INTERPRETATION:  Left Ventricle: The left ventricular systolic function is moderately to severely decreased, with an estimated ejection fraction of 30-35%. There is global hypokinesis of the left ventricle with minor regional variations. The left ventricular cavity size is normal. Left ventricular diastolic filling was not assessed.  Left Atrium: The left atrium is consistent with transplant.  Right Ventricle: The right ventricle is mildly enlarged. There is reduced right ventricular systolic function. A device is visualized in the right ventricle.  Right Atrium: The right atrium is consistent with a transplant.  Aortic Valve: The aortic valve was not well visualized. There is no evidence of aortic valve regurgitation. The peak instantaneous gradient of the aortic valve is 12.2 mmHg.  Mitral Valve: The mitral valve is normal in structure. There is mild mitral valve regurgitation.  Tricuspid Valve: The tricuspid valve is structurally normal. There is mild tricuspid regurgitation. The Doppler estimated RVSP is mildly elevated at 35.9 mmHg.  Pulmonic Valve: The pulmonic valve was not assessed. Pulmonic valve regurgitation was not assessed.  Pericardium: There is a trivial to small pericardial effusion.  Aorta: The aortic root is normal.       CONCLUSIONS:   1. Left ventricular systolic function is  moderately to severely decreased with a 30-35% estimated ejection fraction.   2. There is reduced right ventricular systolic function.   3. Mildly elevated RVSP.   4. There is global hypokinesis of the left ventricle with minor regional variations.    QUANTITATIVE DATA SUMMARY:  2D MEASUREMENTS:                            Normal Ranges:  IVSd:          1.10 cm    (0.6-1.1cm)  LVPWd:         1.20 cm    (0.6-1.1cm)  LVIDd:         4.60 cm    (3.9-5.9cm)  LVIDs:         3.90 cm  LV Mass Index: 102.3 g/m2  LV % FS        15.2 %    LV SYSTOLIC FUNCTION BY 2D PLANIMETRY (MOD):                      Normal Ranges:  EF-A4C View: 25.7 % (>=55%)  EF-A2C View: 38.1 %  EF-Biplane:  33.6 %    AORTIC VALVE:                         Normal Ranges:  AoV Vmax:    1.75 m/s  (<=1.7m/s)  AoV Peak P.2 mmHg (<20mmHg)       RIGHT VENTRICLE:  RV s' 0.08 m/s    TRICUSPID VALVE/RVSP:                              Normal Ranges:  Peak TR Velocity: 2.87 m/s  RV Syst Pressure: 35.9 mmHg (< 30mmHg)  IVC Diam:         2.00 cm       [id number] Param Doan MD  Electronically signed on 3/4/2024 at 1:38:45 PM       ** Final **  Electrocardiogram, 12-lead PRN ACS symptoms  Sinus tachycardia  Low voltage QRS  Septal infarct (cited on or before 2023)  Possible Lateral infarct (cited on or before 2023)  Abnormal ECG  When compared with ECG of 02-MAR-2024 20:40,  No significant change was found  XR chest 1 view  Narrative: Interpreted By:  Raman Roa,   STUDY:  XR CHEST 1 VIEW;  3/4/2024 3:32 am      INDICATION:  Signs/Symptoms:CTICU morning CXR.      COMPARISON:  Chest radiograph dated 3/3/2024      ACCESSION NUMBER(S):  [id number]      ORDERING CLINICIAN:  ENRRIQUE CRISTINA      FINDINGS:  AP radiograph of the chest      Decreased lung volumes are noted. The patient is rotated to the left.  Sternal sutures are noted. An Impella device is in adequate position.  The heart is mildly enlarged. Interstitial opacities are demonstrated  bilaterally  subsegmental atelectasis is suggested within the  retrocardiac left lower lobe.      Impression: 1. Mild interstitial opacities bilaterally accentuated by decreased  lung volumes. The pulmonary vascular distribution is similar previous  study.  2. Possible subsegmental atelectasis and left pleural effusion  obscured by the left ventricle.              Signed by: Raman Roa 3/4/2024 8:49 AM  Dictation workstation:   [id number]    Encounter Date: 02/15/24   Electrocardiogram, 12-lead PRN ACS symptoms   Result Value    Ventricular Rate 149    Atrial Rate 149    AR Interval 120    QRS Duration 76    QT Interval 328    QTC Calculation(Bazett) 516    R Axis -28    T Axis 27    QRS Count 25    Q Onset 225    T Offset 389    QTC Fredericia 444    Narrative    Sinus tachycardia  Low voltage QRS  Septal infarct (cited on or before 14-JUN-2023)  Possible Lateral infarct (cited on or before 14-JUN-2023)  Abnormal ECG  When compared with ECG of 02-MAR-2024 20:40,  No significant change was found      Allergies  Mycophenolate mofetil, Dapagliflozin, Empagliflozin, Topiramate, and Latex  Medications  Scheduled medications  aspirin, 81 mg, oral, Daily  [Held by provider] aspirin, 81 mg, oral, Daily  calcitriol, 0.5 mcg, oral, Daily  fentaNYL, 50 mcg, intravenous, Once  ferrous sulfate (325 mg ferrous sulfate), 65 mg of iron, oral, Daily with breakfast  heparin (porcine), 5,000 Units, subcutaneous, q8h  hydrALAZINE, 100 mg, oral, q8h  insulin lispro, 0-10 Units, subcutaneous, Before meals & nightly  isosorbide dinitrate, 40 mg, oral, q8h  levothyroxine, 250 mcg, oral, Daily  lidocaine, 5 mL, infiltration, Once  lidocaine, 1 patch, transdermal, Daily  melatonin, 10 mg, oral, Nightly  multivitamin with minerals, 1 tablet, oral, Daily  nystatin, 5 mL, Swish & Swallow, q6h  pantoprazole, 40 mg, oral, Daily before breakfast  perflutren protein A microsphere, 0.5 mL, intravenous, Once in imaging  polyethylene glycol, 17 g, oral,  BID  predniSONE, 10 mg, oral, Daily  rosuvastatin, 40 mg, oral, Nightly  sennosides-docusate sodium, 2 tablet, oral, BID  sirolimus, 2.5 mg, oral, Daily  sodium chloride, 5 spray, Each Nostril, 4x daily  [Held by provider] sulfamethoxazole-trimethoprim, 80 mg of trimethoprim, oral, Daily  sulfur hexafluoride microsphr, 2 mL, intravenous, Once in imaging  tacrolimus, 3 mg, oral, q12h TRICIA  traZODone, 50 mg, oral, Nightly  valGANciclovir, 450 mg, oral, q48h      Continuous medications  lactated Ringer's, 5 mL/hr, Last Rate: 5 mL/hr (03/05/24 0900)  lactated Ringer's, 10 mL/hr, Last Rate: Stopped (03/03/24 1200)  milrinone, 0.25 mcg/kg/min (Dosing Weight), Last Rate: 0.25 mcg/kg/min (03/05/24 0417)  sodium bicarbonate infusion Impella Purge 25 mEq/1000 mL D5W, 10 mL/hr      PRN medications  PRN medications: acetaminophen, bisacodyl, calcium gluconate, calcium gluconate, dextrose, dextrose **OR** glucagon, glucagon, hydrALAZINE, artificial tears, magnesium sulfate, magnesium sulfate, microfibrllar collagen, mupirocin, ondansetron, oxyCODONE, oxymetazoline, sodium chloride     Assessment/Plan    Charla Bowles is a 33-year-old woman with history of Stage D HFrEF (PPCM status post ICD, CardioMems) status post OHT (3/31/2022), hypothyroidism status post thyroidectomy, obesity status post gastric bypass procedure (2017), SLE who is admitted for cardiogenic shock suspected secondary to acute cellular vs acute antibody mediated rejection, requiring VA-ECMO support from 2/19-2/29 and IABP support transitioned to right axillary Impella 5.5 on 3/1. She has been treated with aggressive immunosuppression therapies for suspected rejection, although biopsies of graft have been negative. Her course has been complicated by acute renal failure requiring dialysis, acute anemia requiring multiple blood transfusions, pulmonary edema requiring mechanical ventilatory support.     She will continue to receive aggressive immunosuppression and  is planning to be evaluated for advanced therapies candidacy.       #Cardiogenic shock weaned off VA-ECMO to IABP support alone but ultimately required transition from IABP to Impella 5.5 support  #OHT recipient (3/2022)  #Acute decompensated heart failure with biventricular failure  #Severe primary graft dysfunction  #Acute renal failure  #Acute anemia requiring blood transfusions  #Epistaxis  #History of MMF colitis  #History of gastric bypass procedure  #History of hypothyroidism  #History of remote DVT        #Complex Medical Decision Making   #Goals of Care  #Advance Care Planning   - Code status: Full Code  - Surrogate decision maker: patient's mother Fani Geller (present at bedside) [phone]  - Goals are survival and time based     #Acute Pain   - Denies acute pain at this time  - Lidocaine 4% patch q12h available for use  - Oxycodone 5mg IR PO q4h PRN for moderate pain is available for use  - Acetaminophen 975mg q8h PRN for mild pain is available for use    #Insomnia  - Improved with trazodone 50mg at bedtime and melatonin 10mg qHS    #Nausea  - Denies current nausea  - Zofran 4mg IV q4h PRN available for use    #Psychosocial support  - Music therapy - offered but not accepting at this time  - Spiritual care support - offered but not accepting at this time  - Art therapy - offered but not accepting at this time  - Pet therapy  - offered but not accepting at this time        Patient was discussed with Palliative Medicine service attending physician, Dr. Ira Chaudhary.      Signed,  Gwendolyn Del Valle MD  Heart Failure Fellow PGY4  Palliative Medicine Service      I saw and evaluated the patient. I personally obtained the key and critical portions of the history and physical exam or was physically present for key and critical portions performed by the resident/fellow. I reviewed the resident/fellow's documentation and discussed the patient with the resident/fellow. I agree with the resident/fellow's  medical decision making as documented in the note.    Ira Chaudhary MD

## 2024-03-05 NOTE — PROCEDURES
Central Line    Date/Time: 3/5/2024 11:37 AM    Performed by: DIANA Mtz  Authorized by: DIANA Mtz    Consent:     Consent obtained:  Written    Consent given by:  Patient    Risks, benefits, and alternatives were discussed: yes      Risks discussed:  Arterial puncture, bleeding, infection and pneumothorax    Alternatives discussed:  No treatment  Universal protocol:     Procedure explained and questions answered to patient or proxy's satisfaction: yes      Relevant documents present and verified: yes      Test results available: yes      Imaging studies available: yes      Required blood products, implants, devices, and special equipment available: yes      Site/side marked: yes      Immediately prior to procedure, a time out was called: yes      Patient identity confirmed:  Verbally with patient  Pre-procedure details:     Indication(s): central venous access      Indication(s) comment:  Plex and IHD    Hand hygiene: Hand hygiene performed prior to insertion      Sterile barrier technique: All elements of maximal sterile technique followed      Skin preparation:  Chlorhexidine    Skin preparation agent: Skin preparation agent completely dried prior to procedure    Sedation:     Sedation type:  None (50mcg fentanyl)  Anesthesia:     Anesthesia method:  Local infiltration    Local anesthetic:  Lidocaine 2% w/o epi  Procedure details:     Location:  R internal jugular    Patient position:  Supine    Procedural supplies:  Triple lumen (Trialysis)    Catheter size:  13.5 Fr    Landmarks identified: yes      Ultrasound guidance: yes      Ultrasound guidance timing: real time      Sterile ultrasound techniques: Sterile gel and sterile probe covers were used      Number of attempts:  1    Successful placement: yes    Post-procedure details:     Post-procedure:  Dressing applied and line sutured    Assessment:  Blood return through all ports, free fluid flow, no pneumothorax on x-ray  and placement verified by x-ray    Procedure completion:  Tolerated

## 2024-03-06 NOTE — PROGRESS NOTES
"Charla Bowles is a 34 y.o. female on day 20 of admission presenting with Cardiogenic shock (CMS/HCC).    Subjective:L  Completed HD and PLEX yesterday, had 275 ml of urine w/o diuretics. Net positive 1L. Had nausea and vomitting this morning after dose of IV iron.     Last Recorded Vitals  Blood pressure 106/69, pulse (!) 144, temperature 36.9 °C (98.4 °F), temperature source Temporal, resp. rate 18, height 1.549 m (5' 0.98\"), weight 92.4 kg (203 lb 11.3 oz), SpO2 91 %.  Intake/Output last 3 Shifts:  I/O last 3 completed shifts:  In: 5489.4 (59.4 mL/kg) [P.O.:850; I.V.:1196.4 (12.9 mL/kg); Other:3443]  Out: 4013 (43.4 mL/kg) [Urine:565 (0.2 mL/kg/hr); Emesis/NG output:50; Other:3398]  Weight: 92.4 kg     General: No distress   CVS: S1 S2 no murmurs  RESP:  Lungs CTAB on RA  ABDO: Soft, non-tender   Neuro: A + O x 3  Skin: No rash   Extremities: No edema   Impella in place  Right Int jug trialysis     Labs:  Results for orders placed or performed during the hospital encounter of 02/15/24 (from the past 24 hour(s))   Type and Screen   Result Value Ref Range    ABO TYPE O     Rh TYPE POS     ANTIBODY SCREEN NEG    CBC   Result Value Ref Range    WBC 12.1 (H) 4.4 - 11.3 x10*3/uL    nRBC 0.0 0.0 - 0.0 /100 WBCs    RBC 2.77 (L) 4.00 - 5.20 x10*6/uL    Hemoglobin 8.1 (L) 12.0 - 16.0 g/dL    Hematocrit 24.2 (L) 36.0 - 46.0 %    MCV 87 80 - 100 fL    MCH 29.2 26.0 - 34.0 pg    MCHC 33.5 32.0 - 36.0 g/dL    RDW 14.9 (H) 11.5 - 14.5 %    Platelets 140 (L) 150 - 450 x10*3/uL   Renal function panel   Result Value Ref Range    Glucose 156 (H) 74 - 99 mg/dL    Sodium 127 (L) 136 - 145 mmol/L    Potassium 4.7 3.5 - 5.3 mmol/L    Chloride 89 (L) 98 - 107 mmol/L    Bicarbonate 19 (L) 21 - 32 mmol/L    Anion Gap 24 (H) 10 - 20 mmol/L    Urea Nitrogen 121 (HH) 6 - 23 mg/dL    Creatinine 5.21 (H) 0.50 - 1.05 mg/dL    eGFR 11 (L) >60 mL/min/1.73m*2    Calcium 9.5 8.6 - 10.6 mg/dL    Phosphorus 6.3 (H) 2.5 - 4.9 mg/dL    Albumin " 4.2 3.4 - 5.0 g/dL   Magnesium   Result Value Ref Range    Magnesium 2.47 (H) 1.60 - 2.40 mg/dL   Coagulation Screen   Result Value Ref Range    Protime 13.3 (H) 9.8 - 12.8 seconds    INR 1.2 (H) 0.9 - 1.1    aPTT 28 27 - 38 seconds   Calcium, Ionized   Result Value Ref Range    POCT Calcium, Ionized 1.15 1.1 - 1.33 mmol/L   CBC   Result Value Ref Range    WBC 13.2 (H) 4.4 - 11.3 x10*3/uL    nRBC 0.2 (H) 0.0 - 0.0 /100 WBCs    RBC 2.92 (L) 4.00 - 5.20 x10*6/uL    Hemoglobin 8.7 (L) 12.0 - 16.0 g/dL    Hematocrit 24.9 (L) 36.0 - 46.0 %    MCV 85 80 - 100 fL    MCH 29.8 26.0 - 34.0 pg    MCHC 34.9 32.0 - 36.0 g/dL    RDW 14.8 (H) 11.5 - 14.5 %    Platelets 148 (L) 150 - 450 x10*3/uL   Calcium, Ionized   Result Value Ref Range    POCT Calcium, Ionized 1.09 (L) 1.1 - 1.33 mmol/L   BLOOD GAS VENOUS FULL PANEL   Result Value Ref Range    POCT pH, Venous 7.47 (H) 7.33 - 7.43 pH    POCT pCO2, Venous 39 (L) 41 - 51 mm Hg    POCT pO2, Venous 43 35 - 45 mm Hg    POCT SO2, Venous 71 45 - 75 %    POCT Oxy Hemoglobin, Venous 69.0 45.0 - 75.0 %    POCT Hematocrit Calculated, Venous 26.0 (L) 36.0 - 46.0 %    POCT Sodium, Venous 132 (L) 136 - 145 mmol/L    POCT Potassium, Venous 4.0 3.5 - 5.3 mmol/L    POCT Chloride, Venous 97 (L) 98 - 107 mmol/L    POCT Ionized Calicum, Venous 1.21 1.10 - 1.33 mmol/L    POCT Glucose, Venous 118 (H) 74 - 99 mg/dL    POCT Lactate, Venous 0.8 0.4 - 2.0 mmol/L    POCT Base Excess, Venous 4.4 (H) -2.0 - 3.0 mmol/L    POCT HCO3 Calculated, Venous 28.4 (H) 22.0 - 26.0 mmol/L    POCT Hemoglobin, Venous 8.6 (L) 12.0 - 16.0 g/dL    POCT Anion Gap, Venous 11.0 10.0 - 25.0 mmol/L    Patient Temperature 37.0 degrees Celsius    FiO2 21 %   Lactate Dehydrogenase   Result Value Ref Range     (H) 84 - 246 U/L   Hepatic function panel   Result Value Ref Range    Albumin 3.7 3.4 - 5.0 g/dL    Bilirubin, Total 1.0 0.0 - 1.2 mg/dL    Bilirubin, Direct 0.3 0.0 - 0.3 mg/dL    Alkaline Phosphatase 52 33 - 110 U/L  "   ALT 8 7 - 45 U/L    AST 33 9 - 39 U/L    Total Protein 6.0 (L) 6.4 - 8.2 g/dL   Calcium, Ionized   Result Value Ref Range    POCT Calcium, Ionized 1.14 1.1 - 1.33 mmol/L   CBC   Result Value Ref Range    WBC 12.2 (H) 4.4 - 11.3 x10*3/uL    nRBC 0.2 (H) 0.0 - 0.0 /100 WBCs    RBC 2.85 (L) 4.00 - 5.20 x10*6/uL    Hemoglobin 8.4 (L) 12.0 - 16.0 g/dL    Hematocrit 23.3 (L) 36.0 - 46.0 %    MCV 82 80 - 100 fL    MCH 29.5 26.0 - 34.0 pg    MCHC 36.1 (H) 32.0 - 36.0 g/dL    RDW 14.3 11.5 - 14.5 %    Platelets 112 (L) 150 - 450 x10*3/uL   Coagulation Screen   Result Value Ref Range    Protime 14.1 (H) 9.8 - 12.8 seconds    INR 1.3 (H) 0.9 - 1.1    aPTT 30 27 - 38 seconds   Magnesium   Result Value Ref Range    Magnesium 2.09 1.60 - 2.40 mg/dL   Basic Metabolic Panel   Result Value Ref Range    Glucose 151 (H) 74 - 99 mg/dL    Sodium 134 (L) 136 - 145 mmol/L    Potassium 4.3 3.5 - 5.3 mmol/L    Chloride 94 (L) 98 - 107 mmol/L    Bicarbonate 24 21 - 32 mmol/L    Anion Gap 20 10 - 20 mmol/L    Urea Nitrogen 56 (H) 6 - 23 mg/dL    Creatinine 3.01 (H) 0.50 - 1.05 mg/dL    eGFR 20 (L) >60 mL/min/1.73m*2    Calcium 9.0 8.6 - 10.6 mg/dL   Phosphorus   Result Value Ref Range    Phosphorus 4.3 2.5 - 4.9 mg/dL   POCT GLUCOSE   Result Value Ref Range    POCT Glucose 147 (H) 74 - 99 mg/dL     *Note: Due to a large number of results and/or encounters for the requested time period, some results have not been displayed. A complete set of results can be found in Results Review.         Assessment/Plan     Charla Bowles is a 33 y.o. 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       HIRAM on CKD stage 3: (baseline Cr 1.2)   -HIRAM in the setting " of CRS, cardiogenic shock.  Started on CRRT on 2/19/2024 for volume management. Discontinued 2/27.  Clearance remains impaired. Last iHD 3/5   - nonoliguric with IV diuretics  - volume status: euvolemic   - wheatley in place, + 1L in the last 24 hours  - has right int jug trialysis    Plan:  - Would start lasix 80 mg IV BID, goal for negative 500-1L over the next 24 hours  -  monitor daily RFP. Ideally would do iHD and PLEX on alternative days, so next tentative plan for iHD Friday if renal function remains impaired. Will evaluate labs and volume status daily for HD indications.   - continue strict Is/Os    # anemia:   -  continue  venofer 200 mg IV daily for 5 days  - Due to nausea with IV iron, would give benadryl or zofran 30 mins before next dose. Patient agreeable to this  - will continue aranesp  q2 weeks    #BMD:   - calcium WNL and phos elevated     # Immunosuppression:   As per the heart transplant team     # electrolytes  - hyponatremia secondary to impaired free water clearance from kidney injury     Cardiogenic shock  -S/p endomyocardial biopsies on 2/16 and 2/20 without definitive findings but pt treated for presumed rejection.  DSA negative so far. S/p pulse methylprednisolone 1 g for 3 days from 2/16- 2/18, Thymoglobulin -2 doses given on 2/18 and 2/19, IV immunoglobulin plus PLEX sessions done on 2/18 and 2/20.  - planned for endometrial biopsy 3/6  -s/p [facility] ECMO and on IABP support till 3/1/24. Currently with Impella 3/1/24.  - planned for IVIG and PLEX, next PLEX (2/5) 3/7    Kuldip Hernandes MD  Nephrology fellow

## 2024-03-06 NOTE — SIGNIFICANT EVENT
"ENT reengaged for bleeding from R nose that was mild. When ENT came, epistaxis had already resolved spontaneously. Noted that the left sided merocel had been reviewed, and per patient, she removed it late 3/5 but no concerns for bleeding from that area.     Assessment  Charla Bowles is a 34 y.o. female who presented to [facility] on 2/15/2024. ENT consulted for epistaxis and status post placement of nonabsorbable Merocel packing in the left nasal cavity on 3/4. This was removed late 3/5 by patient and there were no concerns for epistaxis today.      Recommendations:  - Can use oxymetazoline (Afrin) 2 sprays each nostril every 8 hours for the next two days (three days total) if further bleeding or oozing occurs. Do not use for more than 3 days total. This can be applied even with packing in the nares.  - If further bleeding occurs, use copious oxymetazoline spray for epistaxis and pinch the nose (\"hold pressure\") for 15 minutes without letting go. If this does not control bleeding then gently blow out clot and repeat 2 more times.  -Nasal saline or Ayr nasal gel multiple times a day for 10 days - please apply even with packing in the nares  -BP control per primary team  -Avoid digital trauma or sticking objects into nose  -Humidifier at night, avoid heaters directly blowing in face  -If O2 needed, use humidified air. Recommend humidifier for home use                 "

## 2024-03-06 NOTE — PROGRESS NOTES
Art Therapy Note    Charla Bowles     Therapy Session  Referral Type: New referral this admission  Visit Type: New visit  Session Start Time: 1558  Session End Time: 1559  Intervention Delivery: In-person  Conflict of Service: Asleep              Treatment/Interventions       Post-assessment  Total Session Time (min): 1 minutes    Narrative  Assessment Detail: ATR attempted session with pt, but she was sleeping.  Follow-up: ATR will f/u w/pt, provided she remains admitted.    Education Documentation  No documentation found.

## 2024-03-06 NOTE — PROGRESS NOTES
Art Therapy Note    Charla Bowles     Therapy Session  Referral Type: New referral this admission  Visit Type: New visit  Session Start Time: 1448  Session End Time: 1449  Intervention Delivery: In-person  Conflict of Service: Asleep              Treatment/Interventions       Post-assessment  Total Session Time (min): 1 minutes    Narrative  Assessment Detail: ATR attempted a session, but pt was asleep.  Follow-up: ATR will f/u another time, provided pt remains admitted.    Education Documentation  No documentation found.

## 2024-03-06 NOTE — PROGRESS NOTES
ENCOUNTER    Visit Type Initial Visit  Location: CTICU Bed 17    Barriers to Communication / Understanding:   [] Language [] Vision [] Hearing [] Other      []  Present     Accompanied By: carol Mohr     Organ For Transplant: Heart    Device For Implant: none    Ethnicity:  Black Or     ADLs Fully Independent    Mom is live in aid   Instrumental ADLs Partially Independent    Level of Activity Active    DME  sometimes use a walker cane shower chair     Knowledge of Health Good      Why Do You Have End Stage Organ Disease due to lupus, needs a re-transplant     Knowledge of Transplant / VAD:  Yes Patient Is Able To Make An Informed Decision    Patient Understands the Risks of Transplant / VAD  Yes Rejection  Yes Infection Yes Complications  Yes Death      Patient Understands Recovery and Follow-Up from Transplant / VAD  Yes Length Of State Yes Appointments  Yes Labs  Yes Rehabilitation      Patient Has Identified Goals of Transplant / VAD Yes  To get home, semi normal to take care of children    Any Potential Donors?     Overall Compliance  good     Alarms on phone,   Compliance With Medications good    Managed By Patient Pill box    Understanding Of Medication  good  Compliance With Appointments good    How Does Patient Handle Health Problems: call tx team    Organ Heart    Heart    Cardiac Rehab: was going but then people had covid       IOP:     Fluid Restriction:   No [] Compliant     Anticoagulation Service:   No [] Compliant   [] INR    Medical And Clinic Appointment Compliant Yes      SOCIAL HISTORY  No   ?  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    Will patient have paid status from employment during recovery   None      Other Sources of Income SSD    Does patient have financial concerns No     Is patient able to meet current monthly expenses Yes    Resources:  Housing Assistance and Food [place] [address]     Patient was provided information on transplant fundraising Yes      Insurance:  Primary Insurance Self [employer] Medicare       FAMILY SYSTEM    Single Yes   No How long   Describe Relationship   No How long   Describe Relationship   No When   No When  In a Relationship No  How Long  Describe Relationship      Children:  Yes # Biological 4 Boys   # Adopted   No # Step Children        Child #1 Name Oksana  Age 17  Health  Lives With patient  How Much Contact Daily      Child #2 Name Kayley  Age 17  Health  Lives With patient  How Much Contact Daily    Child #3 Name Celia  Age 11  Health  Lives With patient  How Much Contact Daily    Child #4 Name Tommie  Age 9  Health  Lives Local and With patient  How Much Contact Daily    Parents:  Raised By One Biological Parent    Did the patient have contact with the other parent No    Mother  No Age   Cause of Death   Father  No Age   Cause of Death    Living Parent #1 Fani    Additional Information    Siblings:  [x] # Biological 4 siblings on mom and 4 on dad, 9 total  [] # Half Siblings [] # Step Siblings     Support & Recovery Plan:  Yes Adequate    Primary Support:  Name Fani  Phone [phone]  Age   Relationship to Patient mom  If employed, can they take time off work Yes [facility]   If so, is it paid time off Yes fmla papers filled out already   If not, will this impact your finances No   Did they attend education classes Yes   Do they have other caregiver responsibilities (child or eldercare) Yes helps with kids   Do they have their own conditions which may prevent them from providing care for you  No  (Medical, psychological, physical limitations)  Are they available on short notice Yes   Are they reliable Yes   Are they responsible Yes   Are they able to understand and process new information Yes   Do they have reliable transportation or will you allow them to use your vehicle Yes   Are they currently involved in your care Yes   Comments    Secondary Support:  Name  Phone Nae Geller [phone]  Age   Relationship to Patient aunt  If employed, can they take time off work Yes   If so, is it paid time off Yes   If not, will this impact your finances No   Did they attend education classes No   Do they have other caregiver responsibilities (child or eldercare) No   Do they have their own conditions which may prevent them from providing care for you No  (Medical, psychological, physical limitations)  Are they available on short notice Yes   Are they reliable Yes   Are they responsible Yes   Are they able to understand and process new information Yes   Do they have reliable transportation or will you allow them to use your vehicle Yes   Are they currently involved in your care Yes   Comments unable to leave a voice message, no vm set up at this time.     Housing:  Yes Adequate Rents home  Type of Home House downstairs   Distance to [facility] 20 mins at most  Pets no  Does Patient Feel Safe in Home Yes     Transportation:  Yes Adequate  # Licensed Drivers in the Home 2  Does Patient Drive Yes If not, why  # Reliable Vehicles 2  Does Patient use Public Transportation No  Does Patient use Medical Transportation No  Comments    MENTAL HEALTH  The patient reports their mood as depressed since being back in this situation, situational depression      Reported Mental Health Diagnosis  Depression situational   Family History of Mental Health Concerns none   What are patient psychosocial stressors being in patient    Cognition:  No impairment observed / reported       Current Medications:  No  Mental Health Meds   Rx'd by    Sleep Meds   Rx'd by   Pain Meds   Rx'd by     OTC Meds none  Past Medications none    Counseling Never      IOP  Has patient ever been hospitalized for mental health reasons No    Referral to Transplant Psych Yes  Mental Health Follow Up Required No     Suicide Assessment:  History of Suicide Ideation No      History of Suicide Attempt No     History of Suicidal Ideation in the past 3 months No Intensity     Patient's Reported Trauma History none    What are patient's coping behaviors planning 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, spend time with children and mom,     Pentecostal / Spirituality Presybeterian    Attitude toward interviewer Cooperative and Appropriate    Eye Contact Patient maintained good eye contact throughout appointment    Appearance The patient was neatly groomed, appropriately dressed and adequately nourished    Affect Appropriate    Thought Process Appropriate    Substance Use /Abuse History:  Current Tobacco User No  Patient uses   Tobacco Frequency   For How Long  Is Patient Required to Quit   Former Tobacco User No  Describe past tobacco use and date quit      Current Alcohol User No  Type of Alcohol Used   Amount  Frequency   Pattern of Alcohol Use  Is Patient Required to Quit No  Continued to use the substance despite being told the substance is affecting their health no  History of problems at work, school or home due to substance use none  Former Alcohol User No  Describe past alcohol use and date quit  Has patient ever gone to CD treatment No  If yes, When, Where and What type of Program  Attends AA meetings Never  No Sponsor  Do support people drink alcohol No  If yes, describe support people's use  Is alcohol kept in the home No  Does Patient need to sign a CD contract No    Current Illegal / Unprescribed Drug User No  Type of Illegal Drug Used   Frequency  Pattern of Drug Use  Is Patient Required to Quit No  Continue to use the substance despite being told the substance is affecting their health  no  History of problems at work, school or home due to substance use none  Former Illegal / Unprescribed Drug User No  Describe past illegal drug use and date quit  Has patient ever gone to CD treatment No  If yes, When, Where and What type of Program   Attends AA/NA  meetings Never   Is patient on a Methadone / Suboxone regiment No  Do support people use illegal drugs No  If yes, describe support people's use  Are illegal drugs kept in the home No  Does Patient need to sign a CD contract No    Prescription Drug Abuse:  No Has patient experienced feelings of addiction  No Has patient experienced symptoms of withdrawal  No Has patient experienced any side effects? e.g.  hallucinations or delusions    Does Patient Meet the Criteria for Alcohol Use Disorder No Diagnosis  Does Patient Meet OSOTC guidelines Yes  Does Patient Meet the Criteria for Illegal Drug Use Disorder No Diagnosis  Does Patient Meet OSOTC guidelines Yes      LEGAL ISSUES  No Arrests  No Currently probation or parole    retirement No  When   How long   Where       Citizenship:  Yes [place] Citizen  No Green Card No Visa    Advance Directives: Yes  HCPOA In chart      Guardian:    DANUTA ROCK met with pt and pt mom Fani at bedside in CTICU bed 17 for an evaluation for potential re-heart transplant. Pt and pt mom were active and engaged during visit. Pt shared that prior to her not feeling well and being hospitalized, she was able to complete her ADL's at home and considered herself as active. Pt stated she has a shower chair and would sometimes use a walker or cane to ambulate but not too often. Pt and pt mom demonstrated great knowledge of tx and what to expect for recovery. Pt shared her goal for transplant is to get home, have a semi normal like again to take care of her children. Pt shared she was doing well with her medications, she manages them on her own using a pill box. Pt shared if she has a new health problem, she would call the tx  team. Pt shared she had previously attended some sessions of cardiac rehab but had stopped attended due to a high number of other participants who had Covid. Pt is not on a fluid restriction and is not on an anticoagulation. Pt earned her high school diploma and is not currently working but is receiving disability. Pt shared her employment hx including working as an [employer], working for [employer] and for her local [employer]. Pt denied any financial concerns and is able to meet all monthly expenses. Pt confirmed she is staying in section 8 housing and is receiving food stamps. Pt shared her insurance is [employer] Medicare. Pt confirmed she is single, never  and she has four biological sons. Pt confirmed she was raised by one parent, her mom. Pt shared she is one of 9 siblings, four on her mom's side and four on her dad's side. Pt named her mom and aunt Nancy as her primary support people. SW was able to confirm pt mom's ability to continue providing support to pt. SW attempted to contact pt aunt Nae but there was no voicemail set up at this time. SW to follow up with pt aunt. Pt confirmed she is currently renting her home, is in the downstairs unit of a two family house. Pt shared she lives about 20 minutes away from [facility] and they do not have any pets at home. Pt confirmed she feels safe at home. Pt stated there are two licensed drivers in the home and pt was still driving before her hospitalization. Pt denied using public or medical transportation. Pt shared her mood has been a bit depressed since being back in this situation with her heart. Pt shared she was previously diagnosed with seasonal depression. Pt denied any family hx of mental health concerns that she is aware of. Pt shared that the most stressful thing for her right now is being inpatient. Pt denied any cognitive concerns. Pt denied being prescribed any medications for mental health, sleep or pain. Pt stated she doesn't take any  medications over the counter. Pt denied ever being hospitalized for mental health reasons. Pt denied any hx of SI, hx of suicide attempt or thoughts in the past three months. Pt shared she is working so hard to fight for life that she would never consider taking hers. Pt denied any hx of trauma. 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 Pt identified with the Muslim joel. Pt denied any current or previous substance use including tobacco, alcohol, or other drugs. Pt denied any feelings of addiction, withdrawal, or side effects. Pt denied any hx of legal concerns and is not on probation or parole at this time. Pt confirmed she is a [place] citizen. Pt confirmed she has advanced directives completed already and they are in the system. SW confirmed pt has no further questions or concerns at this time. SW confirmed contact information.     PLAN  SW to follow up with pt as evaluation continues. SW to present pt to committee. SIPAT score is 7 meaning good candidate, low psychosocial risk with no major contraindications.    Alexa MILLER

## 2024-03-06 NOTE — PROGRESS NOTES
Music Therapy Note    Charla Bowles     Therapy Session  Referral Type: New referral this admission  Visit Type: Follow-up visit  Session Start Time: 1336  Session End Time: 1337  Intervention Delivery: In-person  Conflict of Service: Declined treatment  Number of family members present: 1     Pre-assessment  Pain Score: 0 - No pain  Stress Level (0-10): 0  Verbalized Emotional State:  (fine)         Treatment/Interventions       Post-assessment  Total Session Time (min): 1 minutes    Narrative  Assessment Detail: Pt lying back in bed with 1 family member present. Pt stated she was 'fine' with no pain or stress and declined an MT session for now. MT will continue to f/u    Education Documentation  No documentation found.

## 2024-03-06 NOTE — PROGRESS NOTES
CTICU Progress Note  Charla Bowles/[id number]    Admit Date: 2/15/2024  Hospital Length of Stay: 20   ICU Length of Stay: 15d 15h   CT SURGEON:     SUBJECTIVE:   Overnight no acute events, remained in sinus tach and oliguric.    MEDICATIONS  Infusions:  lactated Ringer's, Last Rate: 5 mL/hr (03/06/24 0800)  lactated Ringer's, Last Rate: Stopped (03/03/24 1200)  milrinone, Last Rate: 0.25 mcg/kg/min (03/06/24 0800)  sodium bicarbonate infusion Impella Purge 25 mEq/1000 mL D5W      Scheduled:  aspirin, 81 mg, Daily  [Held by provider] aspirin, 81 mg, Daily  calcitriol, 0.5 mcg, Daily  darbepoetin floyd, 100 mcg, q14 days  ferrous sulfate (325 mg ferrous sulfate), 65 mg of iron, Daily with breakfast  heparin (porcine), 5,000 Units, q8h  hydrALAZINE, 100 mg, q8h  insulin lispro, 0-10 Units, Before meals & nightly  iron sucrose, 200 mg, Daily  isosorbide dinitrate, 40 mg, q8h  levothyroxine, 250 mcg, Daily  lidocaine, 5 mL, Once  lidocaine, 1 patch, Daily  melatonin, 10 mg, Nightly  multivitamin with minerals, 1 tablet, Daily  nystatin, 5 mL, q6h  pantoprazole, 40 mg, Daily before breakfast  perflutren protein A microsphere, 0.5 mL, Once in imaging  polyethylene glycol, 17 g, BID  predniSONE, 10 mg, Daily  rosuvastatin, 40 mg, Nightly  sennosides-docusate sodium, 2 tablet, BID  sirolimus, 2.5 mg, Daily  sodium chloride, 5 spray, 4x daily  [Held by provider] sulfamethoxazole-trimethoprim, 80 mg of trimethoprim, Daily  sulfur hexafluoride microsphr, 2 mL, Once in imaging  [Held by provider] tacrolimus, 3 mg, q12h TRICIA  traZODone, 50 mg, Nightly  valGANciclovir, 450 mg, q48h      PRN:  acetaminophen, 975 mg, q8h PRN  bisacodyl, 10 mg, Daily PRN  calcium gluconate, 1 g, q6h PRN  calcium gluconate, 2 g, q6h PRN  dextrose, 25 g, q15 min PRN  dextrose, 25 g, q15 min PRN   Or  glucagon, 1 mg, q15 min PRN  glucagon, 1 mg, q15 min PRN  hydrALAZINE, 20 mg, q4h PRN  artificial tears, 2 drop, PRN  magnesium sulfate, 1 g, q6h  "PRN  magnesium sulfate, 2 g, q6h PRN  microfibrllar collagen, , PRN  mupirocin, , BID PRN  ondansetron, 4 mg, q4h PRN  oxyCODONE, 5 mg, q4h PRN  oxymetazoline, 2 spray, q12h PRN  sodium chloride, 1 spray, 4x daily PRN        PHYSICAL EXAM:   Visit Vitals  /79   Pulse (!) 140   Temp 36.9 °C (98.4 °F) (Temporal)   Resp 22   Ht 1.549 m (5' 0.98\")   Wt 92.4 kg (203 lb 11.3 oz)   SpO2 97%   BMI 38.51 kg/m²   OB Status Hysterectomy   Smoking Status Never   BSA 1.99 m²     Wt Readings from Last 5 Encounters:   03/06/24 92.4 kg (203 lb 11.3 oz)   12/07/23 92.1 kg (203 lb)   12/01/23 93 kg (205 lb)   11/29/23 92.9 kg (204 lb 12.8 oz)   11/09/23 91.3 kg (201 lb 3.2 oz)     INTAKE/OUTPUT:  I/O last 3 completed shifts:  In: 5489.4 (59.4 mL/kg) [P.O.:850; I.V.:1196.4 (12.9 mL/kg); Other:3443]  Out: 4013 (43.4 mL/kg) [Urine:565 (0.2 mL/kg/hr); Emesis/NG output:50; Other:3398]  Weight: 92.4 kg        Physical Exam:   - CONSTITUTION: Young appearing female with impella 5.5  - NEUROLOGIC: intact.  Alert, following all commands.   - CARDIOVASCULAR: R axillary impella, no bleeding at site.  P9, 5.2L flow. Sinus tachycardia.  - RESPIRATORY: clear on RA.  No secretions  - GI: soft, obese. Hypoactive BS  - : oliguric   - EXTREMITIES:  Palpable pulses throughout  - SKIN: intact  - PSYCHIATRIC: calm    Daily Risk Screen  Central line: RIJ trialysis, Maintain for inotropic support    Images: CXR w/ mild pulmonary congestion    Invasive Hemodynamics:    Most Recent Range Past 24hrs   BP (Art) 133/102 No data recorded   MAP(Art) 110 mmHg No data recorded   RA/CVP   No data recorded   PA 27/19 No data recorded   PA(mean) 22 mmHg No data recorded   CO 4.9 L/min No data recorded   CI 2.5 L/min/m2 No data recorded   Mixed Venous 63 % No data recorded   SVR  1217 (dyne*sec)/cm5 No data recorded         Assessment/Plan   33 year old female with past medical history of heart transplant in March 2022 with postoperative course complicated by " upper extremity/internal jugular DVTs, and 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 She was admitted to HFICU on 2/15 with concern for cardiogenic shock secondary to allograft rejection and decompensated heart failure with multiorgan dysfunction including significant elevation of liver enzymes and nonoliguric acute kidney injury. Endomyocardial biopsy on 2/16 revealed mild ACR with +CD4s and negative HLAs, however multisystem organ failure persisted with increased inotropic requirements. IABP placed on 2/18. Transferred to CTICU on 2/19 for [facility] ECMO cannulation with Dr. Marina for persistent multi-organ system dysfunction. Intubated 2/21 for respiratory failure 2/2 pulmonary edema, extubated 2/24. ECMO de-cannulated 2/29/24 but had worsening HIRAM and overall declined clinical status and IABP was transitioned to R axillary impella 5.5 3/1.      Plan:  NEURO: No history. Neuro intact with minima pain. Previous insomnia improved -->  - Serial neuro and pain assessments  - PRN Tylenol 975 mg q6  - Continue oxy 5mg PRN.   - PT/OT Consult  - Continue scheduled melatonin 10 mg nightly and Trazodone 50 mg PRN for insomnia  - CAM ICU score qshift. Sleep/wake cycle hygiene     ENT: Pt with signficant epistaxisis overnight 2/28 requiring ENT consult ~ R anterior nare packed with surgicel with resolution. Completed afrin x3 days.  3/3 nose packed by ENT. Nose bleeds intermittently  - Ocean spray 5x day x10 days (completes 3/9)  - Keflex until packing removed  - prn afrin for nose bleeding  - prn ocean spray and mupiricin for dry nasal passages     CV: Patient has a history of heart transplant in March of 2022 with TTE on 11/29 with LVEF 60-65%. Admitted for cardiogenic shock with concern for acute cellular rejection s/p IABP placement (R fem) 2/18 and [facility] ECMO (left fem) 2/19. S/p ECMO decannulation 2/29 with subsequent decline now s/p impella 5.5 and IABP removal 3/1. ECHO 3/1 with EF 30-35% with persisting RV dysfunction.   Currently on milrinone 0.25 mcg/kg/min with impella at P9/4.9L with stable end organ perfusion.  Still trending hypertensive. Tachycardia persisting, unchanged despite repeated changes in therapies.  -->  - Maintain goal MAP 70-90  - Continue full impella support P9  - continue milrinone 0.25  - PO hydralazine to 100mg q8h and PRN hydral 10 mg for MAP >90  - Continue isosorbide to 40mg every 8 hours  - Continue home rosuvastatin 40mg daily  - Continue ASA 81 mg daily  - Hold home amlodipine     PULM: No history. Acute respiratory failure now resolved, intubated 2/21-2/24. Had been on RA. Reintubated for OR 3/1 and extubated 3/2.  On RA -->  - Daily CXR  - Maintain SPO2 > 92%     GI: History of gastric bypass and MMF colitis.  Shock liver resolved.  3/4 Enema and last BM. Tolerating oral diet. -->  - Continue home PPI, calcitriol 0.5mg daily, multivitamin, calcium carbonate 1,250mg BID  - continue diet  - Continue miralax BID & senna-colace BID.      : No history, baseline Cr 1.2-1.3.  HIRAM previously requiring CVVH, likely in setting of cardiorenal physiology. Renal US from 2/19 unremarkable.  HIRAM requiring dialysis, 3/6 IHD. Remains Oliguric and hypervolemic. HIRAM, Hyponatremia improving. -->  - RFP as clinically indicated, replete electrolytes per CTICU protocol  - Lasix 80mg BID  - Likely will need IHD tomorrow-goal -500cc. Nephrology fellow updated on lack of response to lasix.   - nephrology consult     ENDO: History of hypothyroidism TSH 12.02, free thyroxine 2.19 (Endo previously consulted but now signed off). A1c: 6.3. Acceptable glycemic control on SSI. 3/4 TSH 35-->  - Maintain BG <180 with hypoglycemia protocol  - continue SSI  - Continue Synthroid to 250mcg daily  - Endocrine following     HEME: History of remote DVT. PF4 2/21 negative. Acute on chronic iron deficiency anemia. Acute blood loss anemia with repeated transfusions improved off systemic AC, downtrend to 7.3 this morning. Plts overall stable  with no obvious active bleeding. 3/4 1 unit prbc and incremented appropriately -->    - CBC as clinically indicated  - Continue daily ferrous sulfate  - SQH only per discussion with Dr. Viramontes  - Sodium bicarb purge for impella  - Continue ASA  - Will have to discuss with surgeons before systemic heparin  - SCDs and for DVT prophylaxis  - DVT scan of extremities for surveillance   - Last type and screen: 3/5     Rejection/prophylaxis: S/p heart transplant 3/31/2022. Induction basiliximab. Donor HCV -, toxo -/-, CMV -/+. Mild ACR with +CD4 and negative HLAs however clinical presentation concern for AMR rejection.  PLEX/IVIG 2/17, 2/20. Thymo 2/18, 2/19. Repeat biopsy 2/20 with no evidence of on going rejection (ACR 0R, pAMR0 and no C3D or C4D).  - Continue prednisone 10mg daily  - holding tacrolimus with goal level 3-5  - Continue sirolimus 2.5 mg daily with goal level 7-9  - Continue Nystatin suspension 500,000 units q6hr  - Hold bactrim in setting of thrombocytopenia per Transplant  - continue valcyte 450mg q48hrs per transplant team for viremia  - Biopsy cancelled  - IVIG given 3/5  - Transfusion medicine consulted for PLEX. First session 3/5, then Thursday Friday Monday Wednesday     ID: Received Zosyn and Vancomycin on 2/15 in ED. Blood cultures from 2/15 negative. Mild leukocytosis and afebrile. -->  - Trend temp q4h     Skin: No active skin issues.   - Preventative Mepilex dressings in place on sacrum and heels  - Change preventative Mepilex weekly or more frequently as indicated (when moist/soiled)   - Every shift skin assessment per nursing and weekly ICU skin rounds  - Moisture barrier to be applied with christopher care  - Active skin problems addressed with nursing on daily rounds     Proph:  SCDs  SQH  Home PPI     G: Lines  RIJ Trialysis - 3/5  Singh - 3/4     Code status: Full Code      Restraints: none      I personally spent 35 minutes of critical care time directly and personally managing the patient  exclusive of separately billable procedures.     A,B,C,D,E,F,G: reviewed     Dispo: CTICU care for now. Possible transfer to HFICU this week.    CTICU TEAM PHONE [phone]

## 2024-03-06 NOTE — PROGRESS NOTES
Physical Therapy    Physical Therapy Treatment    Patient Name: Charla Bowles  MRN: [id number]  Today's Date: 3/6/2024  Time Calculation  Start Time: 0920  Stop Time: 0947  Time Calculation (min): 27 min       Assessment/Plan   PT Assessment  PT Assessment Results: Decreased strength, Decreased endurance, Impaired balance, Decreased mobility  Rehab Prognosis: Good  Evaluation/Treatment Tolerance: Patient tolerated treatment well  Medical Staff Made Aware: Yes  End of Session Communication: Bedside nurse  End of Session Patient Position: Up in chair, Alarm off, not on at start of session  PT Plan  Inpatient/Swing Bed or Outpatient: Inpatient  PT Plan  Treatment/Interventions: Bed mobility, Transfer training, Gait training, Stair training, Balance training, Strengthening, Endurance training, Therapeutic exercise, Therapeutic activity  PT Plan: Skilled PT  PT Frequency: 5 times per week  PT Discharge Recommendations: High intensity level of continued care  PT Recommended Transfer Status: Assist x1, Assistive device  PT - OK to Discharge: Yes      General Visit Information:   PT  Visit  PT Received On: 03/06/24  Response to Previous Treatment: Patient with no complaints from previous session.  General  Family/Caregiver Present: No  Prior to Session Communication: Bedside nurse  Patient Position Received: Bed, 3 rail up, Alarm off, not on at start of session  Preferred Learning Style: auditory, verbal  General Comment: Awake, alert and willing to participate in PT session.  Pt completed bed mobility, sit<>stand transfer, and ambulation ~300ft x1 with FWW.  x4 stand rest breaks with ambulation and x2 lateral LOB with assistance for recovery.  Chair follow provided if needed.    Subjective   Precautions:  Precautions  Medical Precautions: Fall precautions, Cardiac precautions  Precautions Comment: MAP 70-90, protective precautions, R axillary impella precautions (no pushing/pulling, no shoulder flexion or abduction  >90 degrees)  Vital Signs:  Vital Signs  Heart Rate: (!) 143 (Post: 145 (RN aware of elevated HR, ok'd session with HR <160))  Heart Rate Source: Monitor  SpO2: 97 % (Post: 96)  BP: (!) 122/94 (EOB: 113/76;  Chair: 113/76)  MAP (mmHg): 103 (EOB: 88; Chair: 83)  BP Location: Left arm  BP Method: Automatic  Patient Position:  (Supine start, sitting end of session)    Objective   Pain:  Pain Assessment  Pain Assessment: 0-10  Pain Score: 0 - No pain  Cognition:  Cognition  Overall Cognitive Status: Within Functional Limits  Orientation Level: Oriented X4  Activity Tolerance:  Activity Tolerance  Endurance: Tolerates 10 - 20 min exercise with multiple rests  Early Mobility/Exercise Safety Screen: Proceed with mobilization - No exclusion criteria met  Treatments:  Therapeutic Activity  Therapeutic Activity Performed: Yes  Therapeutic Activity 1: x4 bouts of static standing during ambulation for rest and recovery.  FWW for UE support and CGA provided.  ~30-60sec each breaks.  Therapeutic Activity 2: x2 lateral bouts of LOB in standing with minAx1 for recovery.  Pt with UE support via FWW.    Bed Mobility  Bed Mobility: Yes  Bed Mobility 1  Bed Mobility 1: Supine to sitting  Level of Assistance 1: Moderate assistance (x1)  Bed Mobility Comments 1: HOB elevated, cues for hand placement, draw sheet utilized  Bed Mobility 2  Bed Mobility  2: Scooting  Level of Assistance 2: Minimum assistance (x1)  Bed Mobility Comments 2: Cues for hand placement, draw sheet utilized to assist bringing hips forward towards EOB    Ambulation/Gait Training  Ambulation/Gait Training Performed: Yes  Ambulation/Gait Training 1  Surface 1: Level tile  Device 1: Rolling walker  Assistance 1:  (CGA-minAx1)  Quality of Gait 1:  (Varying ramila speed)  Comments/Distance (ft) 1: ~887xcs2 (x4 stand rest breaks, stable vitals.)  Transfers  Transfer: Yes  Transfer 1  Transfer From 1: Sit to, Stand to  Transfer to 1: Sit, Stand  Technique 1: Sit to stand,  Stand to sit  Transfer Device 1: Walker  Transfer Level of Assistance 1: Minimum assistance (x1)  Trials/Comments 1: x2 transfers completed.  Cues for hand placement and proper use of AD.    Stairs  Stairs: No    Outcome Measures:  [facility] Basic Mobility  Turning from your back to your side while in a flat bed without using bedrails: A lot  Moving from lying on your back to sitting on the side of a flat bed without using bedrails: A lot  Moving to and from bed to chair (including a wheelchair): A little  Standing up from a chair using your arms (e.g. wheelchair or bedside chair): A little  To walk in hospital room: A little  Climbing 3-5 steps with railing: A lot  Basic Mobility - Total Score: 15    FSS-ICU  Ambulation: Walks >/ or equal to 150 feet with minimal assistance x1  Rolling: Moderate assistance (performs 50 - 74% of task)  Sitting: Supervision or set-up only  Transfer Sit-to-Stand: Minimal assistance (performs 75% or more of task)  Transfer Supine-to-Sit: Moderate assistance (performs 50 - 74% of task)  Total Score: 19    Education Documentation  Precautions, taught by Michelle Lee PT at 3/6/2024 11:09 AM.  Learner: Patient  Readiness: Acceptance  Method: Explanation  Response: Verbalizes Understanding, Demonstrated Understanding    Body Mechanics, taught by Michelle Lee PT at 3/6/2024 11:09 AM.  Learner: Patient  Readiness: Acceptance  Method: Explanation  Response: Verbalizes Understanding, Demonstrated Understanding    Mobility Training, taught by Michelle Lee PT at 3/6/2024 11:09 AM.  Learner: Patient  Readiness: Acceptance  Method: Explanation  Response: Verbalizes Understanding, Demonstrated Understanding    Education Comments  No comments found.    EDUCATION:       Encounter Problems       Encounter Problems (Active)       Balance       Pt will demonstrate improved sitting/standing static/dynamic balance activities without LOB for increase in safety prior to DC.  (Progressing)       Start:   03/01/24    Expected End:  03/15/24               Mobility       Pt will ambulate 200ft with appropriate form, CGA or less, LRAD, and no LOB for safe DC.  (Progressing)       Start:  03/01/24    Expected End:  03/15/24            Pt will tolerate >15 minutes of upright standing activity without seated rest breaks with no changes in vital signs for improved functional mobility.  (Progressing)       Start:  03/01/24    Expected End:  03/15/24            Pt will ascend/descend 3 steps with HR with CGA or less in order to safely access her home upon DC.  (Progressing)       Start:  03/01/24    Expected End:  03/15/24               Transfers       Pt will perform bed mobility and sit<>stand transfers with CGA or less and use of LRAD to safety DC.  (Progressing)       Start:  03/01/24    Expected End:  03/15/24

## 2024-03-06 NOTE — SIGNIFICANT EVENT
Upon administration of iron sucrose, pt became nauseous and vomited 50 mL liquid.   Zofran administered. 5 out  of the 10 mL iron administered before pt vomited, pt refused the other 5mL.

## 2024-03-06 NOTE — PROGRESS NOTES
CTICU Attending Assessment Note    This is a 33 yoF who is ~1 year post-heart transplant presenting with acute rejection in cardiogenic shock who requiring MCS with ECMO but now decannulated continuing to require support with Impella.    Neuro: Awake, alert, oriented x3. CAM negative. Continue PO/topical multimodal pain therapy to adequately control pain. Continue CAM assessment and encourage restful sleep per ICU protocols.  Continue to promote mobility. Continue trazadone and melatonin for sleep promotion. It is the patient's birthday today and she is in good spirits.  ENT: Epistaxis has improved but intermittently continuing but packing has been removed. Continue to appreciate ENT recommendations.  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 Will continue IVIG and Plasma exchange to complete course for concern for rejection. Goal MAP 70-90 and CI >2.2. Persistent tachycardia of unclear etiology without signs of malperfusion, may be in part due to milrinone but continues to require inotropy. EKG with sinus tachycardia without arrhythmia. Continue afterload reduction with hydralazine 100 mg and isordil with goal MAP 70-90. Plan for IVIG and plexus per HF recommendations to continue treatment of concern for rejection. Continue ASA and statin.  Pulm: Currently on RA.  Continue aggressive pulmonary toilet with clear CXR.  Renal: Acute renal failure with oliguria now on iHD and will continue to aim net negative fluid balance. Strict I&O with goal UOP >0.5 mL/kg/hr.  Electrolyte protocol.  GI: Continue tolerating diet and will change to renal diet today. Continue bowel regimen.  Heme: Acute postoperative blood loss anemia with goal Hgb >7 and platelets >50. No ongoing signs of bleeding today given resolution of epistaxis and will continue to follow CBC BID. Will discuss restarting heparin gtts with CT surgery.  Endo: Will continue SSI protocol with goal BG  100-180 per post-cardiotomy goal. Continue levothyroxine given hx of hypothyroidism.  ID: No other indications for antibiotics at this time. Continue tacrolimus, valacyclovir, prednisone, sirolimus and appreciate HF recommendations regarding immunosuppression. Continue for IVIG and plasma exchange today per HF and transfusion medicine.    Skin/MSK: Surgical site is C/D/I.  Continue to promote mobility.  Prophy: Continue SCD, SQH, PPI.  A-F Bundle reviewed and addressed as above with multidisciplinary rounds completed at bedside.  Dispo: Continue CTICU care.    Due to the high probability of life threatening clinical decompensation, the patient required critical care time evaluating and managing this patient.  Critical care time included obtaining a history, examining the patient, ordering and reviewing studies, discussing, developing, and implementing a management plan, evaluating the patient's response to treatment, and discussion with other care team providers. I saw and evaluated the patient myself. I reviewed the provider's documentation and discussed the patient with the provider. Critical care time was performed exclusive of billable procedures.    Critical Care Time: 32 minutes    Feliciano Lee MD  Emergency Critical Care Attending Physician

## 2024-03-06 NOTE — PROGRESS NOTES
[facility]  ADVANCED HEART FAILURE AND TRANSPLANT CARDIOLOGY     Charla Bowles is a 33 y.o. female on day 12 of admission presenting with Cardiogenic shock (CMS/HCC).    INTERVAL EVENTS / PERTINENT ROS:    Patient was started on IVIG and therapeutic plasma exchange (3/5). She had dialysis after completion of IVIG/plasma exchange. She tolerated IVIG/plasma exchange. She remains on Impella 5.5 @ P-9 and Milrinone 0.25 mcg gtt. MAP is maintained 70-90. Net I/O: + 1.5 liter. She denies chest discomfort. She reports some wheezing.     Objective     Physical Exam  Constitutional:       General: She is not in acute distress.  HENT:      Head: Normocephalic.      Mouth/Throat:      Mouth: Mucous membranes are moist.   Eyes:      Pupils: Pupils are equal, round, and reactive to light.   Cardiovascular:      Rate and Rhythm: Regular rhythm. Tachycardia present.      Pulses: Normal pulses.      Heart sounds: Murmur heard.      No friction rub. No gallop.      Comments: R axillary 5.5 impella.   Pulmonary:      Effort: Pulmonary effort is normal. No accessory muscle usage or retractions.      Breath sounds: Wheezing present. No rhonchi or rales.   Abdominal:      General: Abdomen is flat. Bowel sounds are normal. There is no distension.      Palpations: Abdomen is soft. There is no mass.      Tenderness: There is no abdominal tenderness. There is no guarding.      Hernia: No hernia is present.   Musculoskeletal:      Cervical back: Full passive range of motion without pain.   Skin:     General: Skin is warm and dry.      Capillary Refill: Capillary refill takes less than 2 seconds.      Coloration: Skin is not jaundiced.      Findings: No laceration, rash or wound.   Neurological:      General: No focal deficit present.      Mental Status: She is alert.       Last Recorded Vitals  Blood pressure 107/82, pulse (!) 144, temperature 36.9 °C (98.4 °F), temperature source Temporal, resp. rate 18,  "height 1.549 m (5' 0.98\"), weight 92.4 kg (203 lb 11.3 oz), SpO2 97 %.  Intake/Output last 3 Shifts:  I/O last 3 completed shifts:  In: 5489.4 (59.4 mL/kg) [P.O.:850; I.V.:1196.4 (12.9 mL/kg); Other:3443]  Out: 4013 (43.4 mL/kg) [Urine:565 (0.2 mL/kg/hr); Emesis/NG output:50; Other:3398]  Weight: 92.4 kg     Relevant Results  Scheduled medications  aspirin, 81 mg, oral, Daily  calcitriol, 0.5 mcg, oral, Daily  cephalexin, 500 mg, oral, q8h  darbepoetin floyd, 100 mcg, subcutaneous, q14 days  ferrous sulfate (325 mg ferrous sulfate), 65 mg of iron, oral, Daily with breakfast  furosemide, 80 mg, intravenous, q12h  heparin (porcine), 5,000 Units, subcutaneous, q8h  hydrALAZINE, 100 mg, oral, q8h  insulin lispro, 0-10 Units, subcutaneous, Before meals & nightly  iron sucrose, 200 mg, intravenous, Daily  isosorbide dinitrate, 40 mg, oral, q8h  levothyroxine, 250 mcg, oral, Daily  melatonin, 10 mg, oral, Nightly  multivitamin with minerals, 1 tablet, oral, Daily  nystatin, 5 mL, Swish & Swallow, q6h  pantoprazole, 40 mg, oral, Daily before breakfast  polyethylene glycol, 17 g, oral, BID  predniSONE, 10 mg, oral, Daily  rosuvastatin, 40 mg, oral, Nightly  sennosides-docusate sodium, 2 tablet, oral, BID  sirolimus, 2.5 mg, oral, Daily  sodium chloride, 5 spray, Each Nostril, 4x daily  [Held by provider] sulfamethoxazole-trimethoprim, 80 mg of trimethoprim, oral, Daily  [Held by provider] tacrolimus, 3 mg, oral, q12h TRICIA  traZODone, 50 mg, oral, Nightly  valGANciclovir, 450 mg, oral, q48h      Continuous medications  lactated Ringer's, 5 mL/hr, Last Rate: 5 mL/hr (03/06/24 1300)  lactated Ringer's, 10 mL/hr, Last Rate: Stopped (03/03/24 1200)  milrinone, 0.25 mcg/kg/min (Dosing Weight), Last Rate: 0.25 mcg/kg/min (03/06/24 1300)  sodium bicarbonate infusion Impella Purge 25 mEq/1000 mL D5W, 10 mL/hr      PRN medications  PRN medications: acetaminophen, bisacodyl, calcium gluconate, calcium gluconate, dextrose, dextrose **OR** " glucagon, glucagon, hydrALAZINE, artificial tears, magnesium sulfate, magnesium sulfate, microfibrllar collagen, mupirocin, ondansetron, oxyCODONE, oxymetazoline, sodium chloride    Results for orders placed or performed during the hospital encounter of 02/15/24 (from the past 24 hour(s))   BLOOD GAS VENOUS FULL PANEL   Result Value Ref Range    POCT pH, Venous 7.47 (H) 7.33 - 7.43 pH    POCT pCO2, Venous 39 (L) 41 - 51 mm Hg    POCT pO2, Venous 43 35 - 45 mm Hg    POCT SO2, Venous 71 45 - 75 %    POCT Oxy Hemoglobin, Venous 69.0 45.0 - 75.0 %    POCT Hematocrit Calculated, Venous 26.0 (L) 36.0 - 46.0 %    POCT Sodium, Venous 132 (L) 136 - 145 mmol/L    POCT Potassium, Venous 4.0 3.5 - 5.3 mmol/L    POCT Chloride, Venous 97 (L) 98 - 107 mmol/L    POCT Ionized Calicum, Venous 1.21 1.10 - 1.33 mmol/L    POCT Glucose, Venous 118 (H) 74 - 99 mg/dL    POCT Lactate, Venous 0.8 0.4 - 2.0 mmol/L    POCT Base Excess, Venous 4.4 (H) -2.0 - 3.0 mmol/L    POCT HCO3 Calculated, Venous 28.4 (H) 22.0 - 26.0 mmol/L    POCT Hemoglobin, Venous 8.6 (L) 12.0 - 16.0 g/dL    POCT Anion Gap, Venous 11.0 10.0 - 25.0 mmol/L    Patient Temperature 37.0 degrees Celsius    FiO2 21 %   Lactate Dehydrogenase   Result Value Ref Range     (H) 84 - 246 U/L   Hepatic function panel   Result Value Ref Range    Albumin 3.7 3.4 - 5.0 g/dL    Bilirubin, Total 1.0 0.0 - 1.2 mg/dL    Bilirubin, Direct 0.3 0.0 - 0.3 mg/dL    Alkaline Phosphatase 52 33 - 110 U/L    ALT 8 7 - 45 U/L    AST 33 9 - 39 U/L    Total Protein 6.0 (L) 6.4 - 8.2 g/dL   Calcium, Ionized   Result Value Ref Range    POCT Calcium, Ionized 1.14 1.1 - 1.33 mmol/L   CBC   Result Value Ref Range    WBC 12.2 (H) 4.4 - 11.3 x10*3/uL    nRBC 0.2 (H) 0.0 - 0.0 /100 WBCs    RBC 2.85 (L) 4.00 - 5.20 x10*6/uL    Hemoglobin 8.4 (L) 12.0 - 16.0 g/dL    Hematocrit 23.3 (L) 36.0 - 46.0 %    MCV 82 80 - 100 fL    MCH 29.5 26.0 - 34.0 pg    MCHC 36.1 (H) 32.0 - 36.0 g/dL    RDW 14.3 11.5 - 14.5 %     Platelets 112 (L) 150 - 450 x10*3/uL   Coagulation Screen   Result Value Ref Range    Protime 14.1 (H) 9.8 - 12.8 seconds    INR 1.3 (H) 0.9 - 1.1    aPTT 30 27 - 38 seconds   Magnesium   Result Value Ref Range    Magnesium 2.09 1.60 - 2.40 mg/dL   Basic Metabolic Panel   Result Value Ref Range    Glucose 151 (H) 74 - 99 mg/dL    Sodium 134 (L) 136 - 145 mmol/L    Potassium 4.3 3.5 - 5.3 mmol/L    Chloride 94 (L) 98 - 107 mmol/L    Bicarbonate 24 21 - 32 mmol/L    Anion Gap 20 10 - 20 mmol/L    Urea Nitrogen 56 (H) 6 - 23 mg/dL    Creatinine 3.01 (H) 0.50 - 1.05 mg/dL    eGFR 20 (L) >60 mL/min/1.73m*2    Calcium 9.0 8.6 - 10.6 mg/dL   Phosphorus   Result Value Ref Range    Phosphorus 4.3 2.5 - 4.9 mg/dL   Tacrolimus level   Result Value Ref Range    Tacrolimus  7.1 <=15.0 ng/mL   Sirolimus level   Result Value Ref Range    Sirolimus  9.7 4.0 - 20.0 ng/mL   POCT GLUCOSE   Result Value Ref Range    POCT Glucose 147 (H) 74 - 99 mg/dL   Renal function panel   Result Value Ref Range    Glucose 178 (H) 74 - 99 mg/dL    Sodium 132 (L) 136 - 145 mmol/L    Potassium 4.4 3.5 - 5.3 mmol/L    Chloride 92 (L) 98 - 107 mmol/L    Bicarbonate 26 21 - 32 mmol/L    Anion Gap 18 10 - 20 mmol/L    Urea Nitrogen 67 (H) 6 - 23 mg/dL    Creatinine 3.83 (H) 0.50 - 1.05 mg/dL    eGFR 15 (L) >60 mL/min/1.73m*2    Calcium 9.2 8.6 - 10.6 mg/dL    Phosphorus 5.0 (H) 2.5 - 4.9 mg/dL    Albumin 3.8 3.4 - 5.0 g/dL   Magnesium   Result Value Ref Range    Magnesium 2.27 1.60 - 2.40 mg/dL   Coagulation Screen   Result Value Ref Range    Protime 14.8 (H) 9.8 - 12.8 seconds    INR 1.3 (H) 0.9 - 1.1    aPTT 30 27 - 38 seconds   CBC   Result Value Ref Range    WBC 12.6 (H) 4.4 - 11.3 x10*3/uL    nRBC 0.3 (H) 0.0 - 0.0 /100 WBCs    RBC 2.79 (L) 4.00 - 5.20 x10*6/uL    Hemoglobin 8.1 (L) 12.0 - 16.0 g/dL    Hematocrit 23.6 (L) 36.0 - 46.0 %    MCV 85 80 - 100 fL    MCH 29.0 26.0 - 34.0 pg    MCHC 34.3 32.0 - 36.0 g/dL    RDW 14.8 (H) 11.5 - 14.5 %     Platelets 131 (L) 150 - 450 x10*3/uL   Calcium, Ionized   Result Value Ref Range    POCT Calcium, Ionized 1.14 1.1 - 1.33 mmol/L     *Note: Due to a large number of results and/or encounters for the requested time period, some results have not been displayed. A complete set of results can be found in Results Review.         Assessment/Plan   Assessment: Ms. 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    Originally presented to [facility] ED on 2/15/24 with complaints of N/V x 3 days and inability to keep medications down. POCUS revealed acute systolic heart failure w/ severe BIV dysfunction when compared to previous images in 11/2023. Also noted to have HIRAM requiring CVVH and transaminitis. Immunosuppression notably below therapeutic range. Admitted to HFICU and treated for suspected acute cellular vs. acute antibody mediated rejection; however, cardiac biopsy negative for signs of rejection. Despite rejection therapy, inotropes and MCS via IABP (2/18/24), the patient's end organ function continued to worsen without improvement in cardiac function. Ultimately placed on left femoral VA ECMO w/ Dr. Marina on 2/19/2024 and transferred to CTICU.     CTICU course complicated by anemia requiring blood product transfusions, epistaxis, volume overload & intubation (tachypnea and increased work of breathing 2/2 pulmonary edema). Status post extubation on 2/24/24. Now showing renal recovery off CVVH and cardiac allograft recovery s/p ECMO decannulation on 2/29/24 w/ Dr. Witt.     She has been started on IVIG + therapeutic plasma exchange (03/05) - 1st of 5 sessions.     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)  Toxo: -/-   Hep C: -/-    Rejection/Prophylaxis (transplants):  - Steroids: Continue 10mg PO prednisone daily  - Tacrolimus: 3.0mg PO @ 0630 & 3.0mg PO @ 1830 w/ daily levels drawn @ 0600  - Serolimus: 2.5mg PO daily w/ daily levels drawn at 0600  - Tacrolimus goal troughs: 3-5  - Serolimus goal troughs: 7-9  - Combined sirolimus/tacrolimus goal of 10-12  - Antifungals: nystatin 500,000units Q6  - Antivirals: continue valcyte 450mg Q48hrs due to low grade viremia   - Anti PCP & Toxoplasmosis: Bactrim SS daily  --> Holding due to thrombocytopenia (2/23)    Last cardiac biopsy: 2/16/24 with ACR1 and no AMR  Last HLA: 2/16/24 negative for DSAs   Last RHC: 2/16/24 w/ RA 11, PAP 34/14(23), W 19 and C.I. 2.3  Last LHC: 2/18/24 negative for CAV and CAD   Last TTE/BOB: 3/1/24 shows LVEF to 30% and mild RV dysfunction (intra-op impella 5.5 insert)  Osteopenia/osteoporosis prophylaxis: Vitamin D3 and calcium supplements  Peptic/gastric ulcer prophylaxis: Pantoprazole 40mg daily   CAV Prophylaxis: Aspirin 81mg daily & pravastatin daily    - Unclear cause of acute severe graft dysfunction. Most likely smoldering ACR vs. AMR; however, 2xallograft biopsies on 2/16 & 2/20 remain negative for any significant pathology. Notably, both biopsies taken after rejection therapies implemented which may have reduced areas of graft damage.    - DSAs remain negative; however, patient may have non-HLA antibodies present. 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   - Negative CAV & CAD via LHC on 2/18/24   - Completed methylpred steroid pulse w/ 1g Q24 x 3 days (2/16-2/18) and prednisone  taper   - Thymoglobulin doses: 2/18 & 2/19   - IVIG + PLEX Session: 2/18 & 2/20   - Extubated on 3/2/24.  - CVVH stopped on 2/27/24. HIRAM previously improving and autodiuresing; however, since ECMO decannulation, UOP has decreased and creatinine uptrending   - Graft function slightly worse after transition to impella 5.5. IABP removed 3/1/24.  - On Milrinone 0.25mcg/kg/min. Impella up to P9. SGC removed 3/2/24. Team is trying to liberate from lines as able.     Recommendations:  - Maintain current Impella 5.5 P-9 support and milrinone gtt.  - Start advanced therapy evaluation (dual heart/kidney transplant).   - Next IVIF and therapeutic plasma exchange scheduled on 03/07.  - Tacrolimus 3 mg po bid ON HOLD. Target tacrolimus trough level: 3-5  - C/w Sirolimus 2.5 mg po daily. Target sirolimus trough level: 7-9  - C/w Prednisone 10 mg po every day.   - Continue valcyte 450mg Q48hrs and continue to check CMV PCRs weekly (next due on 3/8/24)   - Restart systemic anticoagulation for Impella after discussion with CT surgery.    Patient was examined and discussed with HF attending, Dr. DENNIS Price.     Nasir Piper MD, PGY-4  Advanced Heart Failure & Transplant Fellow

## 2024-03-06 NOTE — PROGRESS NOTES
"Nutrition Follow Up Assessment:   Nutrition Assessment       Pt remains in the CTICU on Impella support (LOS 16) > transitioned form CRRT to iHD.  Also s/p first session of IVIG/PLEX for rejection 3/05.      Nutrition History:  Energy Intake: Fair 50-75 % (3/05 50% meal @ 2000)  Food and Nutrient History: Recent episodes of emesis following epitaxis and med admin (iron) overnight. Diet has changed several times within the past ~week: regular 2/25 to carb controlled on 2/27 to renal/cardiac since 3/02. Pt notes hasnt been able to eat much recently due to \"too much going on\" in reference to recent dialysis, PLEX session, therapy. Asking for an enema this afternoon > thinks if she is able to poop she will be able to eat. Declines ONS nor does she want to have any tube feeds.  Food Allergies/Intolerances:  None  GI Symptoms: Constipation and Vomiting  - asking for an enema at visit this afternoon  Oral Problems: None     Anthropometrics:  Height: 154.9 cm (5' 0.98\")   Weight: 92.4 kg (203 lb 11.3 oz)   BMI (Calculated): 38.51  IBW/kg (Dietitian Calculated): 47.7 kg  Percent of IBW: 191 %  Adjusted Body Weight (kg): 59 kg    Weight History:   Date/Time Weight Dosing Weight   03/06/24 0600 92.4 kg (203 lb 11.3 oz) --   03/05/24 0600 93.2 kg (205 lb 7.5 oz) --   03/04/24 0600 90.6 kg (199 lb 11.8 oz) --   03/03/24 0706 90.2 kg (198 lb 13.7 oz) --   02/27/24 0815 91.3 kg (201 lb 4.5 oz) --   02/26/24 0936 92.8 kg (204 lb 9.4 oz) --   02/23/24 1300 91.7 kg (202 lb 2.6 oz) --   02/21/24 2000 99.6 kg (219 lb 9.3 oz) 99.6 kg (219 lb 9.3 oz)   02/21/24 0835 -- 99.6 kg (219 lb 9.3 oz)   02/21/24 0317 99.6 kg (219 lb 9.3 oz) --     Weight Change %:       Nutrition Focused Physical Exam Findings:  Subcutaneous Fat Loss:   Orbital Fat Pads: Defer (well nourished as per initial eval 2/21)  Muscle Wasting:     Edema:  Edema: +1 trace  Edema Location: non-pitting, generalized  Physical Findings:  Hair: Negative  Eyes: Negative  Mouth: " Negative  Nails: Negative  Skin: Positive (surgical sites s/p ECMO decan)    Nutrition Significant Labs:  CBC Trend:   Results from last 7 days   Lab Units 03/06/24  1203 03/06/24  0110 03/05/24  1432 03/05/24  1139   WBC AUTO x10*3/uL 12.6* 12.2* 13.2* 12.1*   RBC AUTO x10*6/uL 2.79* 2.85* 2.92* 2.77*   HEMOGLOBIN g/dL 8.1* 8.4* 8.7* 8.1*   HEMATOCRIT % 23.6* 23.3* 24.9* 24.2*   MCV fL 85 82 85 87   PLATELETS AUTO x10*3/uL 131* 112* 148* 140*    , A1C:  Lab Results   Component Value Date    HGBA1C 6.3 (H) 02/16/2024   , BG POCT trend:   Results from last 7 days   Lab Units 03/06/24  0836 03/05/24  0838 03/04/24  2046 03/04/24  1538 03/04/24  1321   POCT GLUCOSE mg/dL 147* 144* 171* 177* 175*    , Liver Function Trend:   Results from last 7 days   Lab Units 03/06/24  0110 03/05/24  0308 03/04/24  0338 03/03/24  0338   ALK PHOS U/L 52 45 40 41   AST U/L 33 31 32 29   ALT U/L 8 5* 5* 7   BILIRUBIN TOTAL mg/dL 1.0 0.9 0.8 0.8    , Renal Lab Trend:   Results from last 7 days   Lab Units 03/06/24  1203 03/06/24  0110 03/05/24  1139 03/05/24  0308   POTASSIUM mmol/L 4.4 4.3 4.7 4.8   PHOSPHORUS mg/dL 5.0* 4.3 6.3* 6.1*   SODIUM mmol/L 132* 134* 127* 128*   MAGNESIUM mg/dL 2.27 2.09 2.47* 2.42*   EGFR mL/min/1.73m*2 15* 20* 11* 11*   BUN mg/dL 67* 56* 121* 118*   CREATININE mg/dL 3.83* 3.01* 5.21* 5.03*        Nutrition Specific Medications:  calcitriol, 0.5 mcg, oral, Daily  cephalexin, 500 mg, oral, q8h  darbepoetin floyd, 100 mcg, subcutaneous, q14 days  ferrous sulfate (325 mg ferrous sulfate), 65 mg of iron, oral, Daily with breakfast  furosemide, 80 mg, intravenous, q12h  iron sucrose, 200 mg, intravenous, Daily  levothyroxine, 250 mcg, oral, Daily  multivitamin with minerals, 1 tablet, oral, Daily  nystatin, 5 mL, Swish & Swallow, q6h  pantoprazole, 40 mg, oral, Daily before breakfast  polyethylene glycol, 17 g, oral, BID  predniSONE, 10 mg, oral, Daily  sennosides-docusate sodium, 2 tablet, oral,  BID  valGANciclovir, 450 mg, oral, q48h      Continuous medications  lactated Ringer's, 5 mL/hr, Last Rate: 5 mL/hr (03/06/24 1300)  lactated Ringer's, 10 mL/hr, Last Rate: Stopped (03/03/24 1200)  milrinone, 0.25 mcg/kg/min (Dosing Weight), Last Rate: 0.25 mcg/kg/min (03/06/24 1300)  sodium bicarbonate infusion Impella Purge 25 mEq/1000 mL D5W, 10 mL/hr      I/O:   Last BM Date: 03/04/24; Stool Appearance: Loose (03/04/24 1100)    Dietary Orders (From admission, onward)       Start     Ordered    03/02/24 1955  Adult diet Cardiac, Renal; 70 gm fat; 2 - 3 grams Sodium; Potassium Restricted 2 gm (50mEq)  Diet effective now        Question Answer Comment   Diet type Cardiac    Diet type Renal    Fat restriction: 70 gm fat    Sodium restriction: 2 - 3 grams Sodium    Potassium restriction: Potassium Restricted 2 gm (50mEq)        03/02/24 1954                     Estimated Needs:   Total Energy Estimated Needs (kCal): 1800 kCal  Method for Estimating Needs: 30 kcals/kg of adjusted BW ; REE using MSJ = 1562 kcals/d  Total Protein Estimated Needs (g):  (75-90)  Method for Estimating Needs: ~1.3-1.5 gm/kg of adjusted BW  Total Fluid Estimated Needs (mL):  (per team)           Nutrition Diagnosis   Malnutrition Diagnosis  Patient has Malnutrition Diagnosis: No    Nutrition Diagnosis  Patient has Nutrition Diagnosis: Yes  Diagnosis Status (1): Ongoing  Nutrition Diagnosis 1: Increased nutrient needs (protein)  Related to (1): altered metabolism for healing/recovery from critical illness  As Evidenced by (1): s/p ECMO & CVVH >>  3/06) transitioned to iHD, s/p Impella  Additional Nutrition Diagnosis: Diagnosis 3  Diagnosis Status (2): Ongoing (improving)  Nutrition Diagnosis 2: Unintended weight gain  Related to (2): acute on chronic illness  As Evidenced by (2): up 41# since end of 3/29/23  Additional Assessment Information (2): wt now trending down from fluid removal  Diagnosis Status (3): New  Nutrition Diagnosis 3:  Inadequate oral intake  Related to (3): acute events (nose bleed, nausea/vomiting, constipation)  As Evidenced by (3): reduced ability to consume PO past few days  Additional Assessment Information (3): declines ONS       Nutrition Interventions/Recommendations         Nutrition Prescription:  continue renal diet restriction ; d/c cardiac diet as renal diet is already limiting        Nutrition Interventions:   Meals and Snacks: Mineral-modified diet  Goal: liberalize diet for increased menu options while hospitalized > pt declines ONS > provide renal diet menu extras list         Nutrition Monitoring and Evaluation   Food/Nutrient Related History Monitoring  Energy Intake: Estimated energy intake  Criteria: >75% est needs met via PO diet alone         Biochemical Data, Medical Tests and Procedures  Monitoring and Evaluation Plan: Electrolyte/renal panel  Electrolyte and Renal Panel: Phosphorus, Potassium  Criteria: WNL in order to liberalize diet    Nutrition Focused Physical Findings  Adipose: Loss of subcutaneous fat  Criteria: minimize loss  Muscles: Muscle atrophy  Criteria: minimize loss       Time Spent/Follow-up Reminder:   Time Spent (min): 60 minutes  Last Date of Nutrition Visit: 03/06/24  Nutrition Follow-Up Needed?: Dietitian to reassess per policy  Follow up Comment: s/p Impella, PLEX, iHD

## 2024-03-07 NOTE — PROGRESS NOTES
"[facility]  ADVANCED HEART FAILURE AND TRANSPLANT CARDIOLOGY     Charla Bowles is a 33 y.o. female on day 16 of admission presenting with Cardiogenic shock (CMS/HCC).    INTERVAL EVENTS / PERTINENT ROS:    Patient remains on P9 support. Planned for additional HD/PLEX/IVIG sessions today. Ongoing re-transplantation listing evaluation.      Objective     Physical Exam  Constitutional:       General: She is not in acute distress.  HENT:      Head: Normocephalic.      Mouth/Throat:      Mouth: Mucous membranes are moist.   Eyes:      Pupils: Pupils are equal, round, and reactive to light.   Cardiovascular:      Rate and Rhythm: Regular rhythm. Tachycardia present.      Pulses: Normal pulses.      Heart sounds: Murmur heard.      No friction rub. No gallop.      Comments: R axillary 5.5 impella.   Pulmonary:      Effort: Pulmonary effort is normal. No accessory muscle usage or retractions.      Breath sounds: Wheezing present. No rhonchi or rales.   Abdominal:      General: Abdomen is flat. Bowel sounds are normal. There is no distension.      Palpations: Abdomen is soft. There is no mass.      Tenderness: There is no abdominal tenderness. There is no guarding.      Hernia: No hernia is present.   Musculoskeletal:      Cervical back: Full passive range of motion without pain.   Skin:     General: Skin is warm and dry.      Capillary Refill: Capillary refill takes less than 2 seconds.      Coloration: Skin is not jaundiced.      Findings: No laceration, rash or wound.   Neurological:      General: No focal deficit present.      Mental Status: She is alert.       Last Recorded Vitals  Blood pressure 99/66, pulse (!) 138, temperature 36.6 °C (97.9 °F), resp. rate (!) 28, height 1.549 m (5' 0.98\"), weight 92.4 kg (203 lb 11.3 oz), SpO2 99 %.  Intake/Output last 3 Shifts:  I/O last 3 completed shifts:  In: 2196.3 (23.8 mL/kg) [P.O.:1200; I.V.:996.3 (10.8 mL/kg)]  Out: 260 (2.8 mL/kg) " [Urine:210 (0.1 mL/kg/hr); Emesis/NG output:50]  Weight: 92.4 kg     Relevant Results  Scheduled medications  acetaminophen, 650 mg, oral, Once  aspirin, 81 mg, oral, Daily  calcitriol, 0.5 mcg, oral, Daily  darbepoetin floyd, 100 mcg, subcutaneous, q14 days  diphenhydrAMINE, 25 mg, oral, Once  [Held by provider] ferrous sulfate (325 mg ferrous sulfate), 65 mg of iron, oral, Daily with breakfast  heparin (porcine), 5,000 Units, subcutaneous, q8h  heparin, 1,000 Units, intravenous, Once  heparin, 1,000 Units, intravenous, Once  hydrALAZINE, 100 mg, oral, q8h  immune globulin (human), 10 g, intravenous, Once  insulin lispro, 0-10 Units, subcutaneous, Before meals & nightly  iron sucrose, 200 mg, intravenous, Daily  isosorbide dinitrate, 40 mg, oral, q8h  levothyroxine, 250 mcg, oral, Daily  melatonin, 10 mg, oral, Nightly  multivitamin with minerals, 1 tablet, oral, Daily  nystatin, 5 mL, Swish & Swallow, q6h  pantoprazole, 40 mg, oral, Daily before breakfast  polyethylene glycol, 17 g, oral, BID  predniSONE, 10 mg, oral, Daily  rosuvastatin, 40 mg, oral, Nightly  sennosides-docusate sodium, 2 tablet, oral, BID  sirolimus, 2.5 mg, oral, Daily  sodium chloride, 5 spray, Each Nostril, 4x daily  [Held by provider] sulfamethoxazole-trimethoprim, 80 mg of trimethoprim, oral, Daily  [Held by provider] tacrolimus, 3 mg, oral, q12h TRICIA  traZODone, 50 mg, oral, Nightly  valGANciclovir, 450 mg, oral, q48h      Continuous medications  lactated Ringer's, 5 mL/hr, Last Rate: 5 mL/hr (03/07/24 1100)  lactated Ringer's, 10 mL/hr, Last Rate: Stopped (03/03/24 1200)  milrinone, 0.25 mcg/kg/min (Dosing Weight), Last Rate: 0.25 mcg/kg/min (03/07/24 0632)  sodium bicarbonate infusion Impella Purge 25 mEq/1000 mL D5W, 10 mL/hr      PRN medications  PRN medications: acetaminophen, bisacodyl, calcium gluconate, calcium gluconate, dextrose, dextrose **OR** glucagon, diphenhydrAMINE, glucagon, hydrALAZINE, artificial tears, magnesium sulfate,  magnesium sulfate, microfibrllar collagen, mupirocin, ondansetron, oxyCODONE, sodium chloride    Results for orders placed or performed during the hospital encounter of 02/15/24 (from the past 24 hour(s))   Renal function panel   Result Value Ref Range    Glucose 178 (H) 74 - 99 mg/dL    Sodium 132 (L) 136 - 145 mmol/L    Potassium 4.4 3.5 - 5.3 mmol/L    Chloride 92 (L) 98 - 107 mmol/L    Bicarbonate 26 21 - 32 mmol/L    Anion Gap 18 10 - 20 mmol/L    Urea Nitrogen 67 (H) 6 - 23 mg/dL    Creatinine 3.83 (H) 0.50 - 1.05 mg/dL    eGFR 15 (L) >60 mL/min/1.73m*2    Calcium 9.2 8.6 - 10.6 mg/dL    Phosphorus 5.0 (H) 2.5 - 4.9 mg/dL    Albumin 3.8 3.4 - 5.0 g/dL   Magnesium   Result Value Ref Range    Magnesium 2.27 1.60 - 2.40 mg/dL   Coagulation Screen   Result Value Ref Range    Protime 14.8 (H) 9.8 - 12.8 seconds    INR 1.3 (H) 0.9 - 1.1    aPTT 30 27 - 38 seconds   CBC   Result Value Ref Range    WBC 12.6 (H) 4.4 - 11.3 x10*3/uL    nRBC 0.3 (H) 0.0 - 0.0 /100 WBCs    RBC 2.79 (L) 4.00 - 5.20 x10*6/uL    Hemoglobin 8.1 (L) 12.0 - 16.0 g/dL    Hematocrit 23.6 (L) 36.0 - 46.0 %    MCV 85 80 - 100 fL    MCH 29.0 26.0 - 34.0 pg    MCHC 34.3 32.0 - 36.0 g/dL    RDW 14.8 (H) 11.5 - 14.5 %    Platelets 131 (L) 150 - 450 x10*3/uL   Calcium, Ionized   Result Value Ref Range    POCT Calcium, Ionized 1.14 1.1 - 1.33 mmol/L   Prepare Plasma Exchange: 10 Units   Result Value Ref Range    PRODUCT CODE Y9082J64     Unit Number [id number]     Unit ABO O     Unit RH POS     Dispense Status IS     Blood Expiration Date March 11, 2024 13:48 EDT     PRODUCT BLOOD TYPE 5100     UNIT VOLUME 224     PRODUCT CODE T6907U57     Unit Number [id number]     Unit ABO O     Unit RH POS     Dispense Status IS     Blood Expiration Date March 11, 2024 13:48 EDT     PRODUCT BLOOD TYPE 5100     UNIT VOLUME 200     PRODUCT CODE R1610Z23     Unit Number [id number]     Unit ABO O     Unit RH POS     Dispense Status IS     Blood Expiration  Date March 11, 2024 13:48 EDT     PRODUCT BLOOD TYPE 5100     UNIT VOLUME 219     PRODUCT CODE Y1853C59     Unit Number [id number]     Unit ABO O     Unit RH POS     Dispense Status IS     Blood Expiration Date March 11, 2024 13:50 EDT     PRODUCT BLOOD TYPE 5100     UNIT VOLUME 221     PRODUCT CODE I9687R75     Unit Number [id number]     Unit ABO O     Unit RH POS     Dispense Status IS     Blood Expiration Date March 11, 2024 13:50 EDT     PRODUCT BLOOD TYPE 5100     UNIT VOLUME 221     PRODUCT CODE M6081F39     Unit Number [id number]     Unit ABO O     Unit RH POS     Dispense Status IS     Blood Expiration Date March 11, 2024 14:23 EDT     PRODUCT BLOOD TYPE 5100     UNIT VOLUME 220     PRODUCT CODE M7668I07     Unit Number [id number]     Unit ABO O     Unit RH POS     Dispense Status IS     Blood Expiration Date March 11, 2024 13:48 EDT     PRODUCT BLOOD TYPE 5100     UNIT VOLUME 286     PRODUCT CODE Y7044G82     Unit Number [id number]     Unit ABO O     Unit RH POS     Dispense Status IS     Blood Expiration Date March 11, 2024 13:50 EDT     PRODUCT BLOOD TYPE 5100     UNIT VOLUME 287     PRODUCT CODE A2993F09     Unit Number [id number]     Unit ABO O     Unit RH POS     Dispense Status IS     Blood Expiration Date March 11, 2024 14:23 EDT     PRODUCT BLOOD TYPE 5100     UNIT VOLUME 296     PRODUCT CODE F2981M34     Unit Number [id number]     Unit ABO O     Unit RH NEG     Dispense Status IS     Blood Expiration Date March 11, 2024 13:50 EDT     PRODUCT BLOOD TYPE 9500     UNIT VOLUME 224    Prepare Plasma   Result Value Ref Range    PRODUCT CODE A0579U20     Unit Number [id number]     Unit ABO O     Unit RH NEG     Dispense Status IS     Blood Expiration Date March 11, 2024 14:23 EDT     PRODUCT BLOOD TYPE 9500     UNIT VOLUME 335    Prepare Plasma   Result Value Ref Range    PRODUCT CODE N1395P45     Unit Number [id number]     Unit ABO O     Unit RH NEG      Dispense Status IS     Blood Expiration Date March 11, 2024 14:23 EDT     PRODUCT BLOOD TYPE 9500     UNIT VOLUME 321    POCT GLUCOSE   Result Value Ref Range    POCT Glucose 161 (H) 74 - 99 mg/dL   CBC and Auto Differential   Result Value Ref Range    WBC 10.4 4.4 - 11.3 x10*3/uL    nRBC 0.8 (H) 0.0 - 0.0 /100 WBCs    RBC 2.66 (L) 4.00 - 5.20 x10*6/uL    Hemoglobin 7.5 (L) 12.0 - 16.0 g/dL    Hematocrit 22.6 (L) 36.0 - 46.0 %    MCV 85 80 - 100 fL    MCH 28.2 26.0 - 34.0 pg    MCHC 33.2 32.0 - 36.0 g/dL    RDW 14.6 (H) 11.5 - 14.5 %    Platelets 163 150 - 450 x10*3/uL    Neutrophils % 73.6 40.0 - 80.0 %    Immature Granulocytes %, Automated 2.7 (H) 0.0 - 0.9 %    Lymphocytes % 6.7 13.0 - 44.0 %    Monocytes % 15.5 2.0 - 10.0 %    Eosinophils % 1.2 0.0 - 6.0 %    Basophils % 0.3 0.0 - 2.0 %    Neutrophils Absolute 7.68 1.20 - 7.70 x10*3/uL    Immature Granulocytes Absolute, Automated 0.28 0.00 - 0.70 x10*3/uL    Lymphocytes Absolute 0.70 (L) 1.20 - 4.80 x10*3/uL    Monocytes Absolute 1.62 (H) 0.10 - 1.00 x10*3/uL    Eosinophils Absolute 0.13 0.00 - 0.70 x10*3/uL    Basophils Absolute 0.03 0.00 - 0.10 x10*3/uL   Coagulation Screen   Result Value Ref Range    Protime 14.8 (H) 9.8 - 12.8 seconds    INR 1.3 (H) 0.9 - 1.1    aPTT 27 27 - 38 seconds   Comprehensive Metabolic Panel   Result Value Ref Range    Glucose 132 (H) 74 - 99 mg/dL    Sodium 133 (L) 136 - 145 mmol/L    Potassium 4.6 3.5 - 5.3 mmol/L    Chloride 92 (L) 98 - 107 mmol/L    Bicarbonate 25 21 - 32 mmol/L    Anion Gap 21 (H) 10 - 20 mmol/L    Urea Nitrogen 80 (H) 6 - 23 mg/dL    Creatinine 4.98 (H) 0.50 - 1.05 mg/dL    eGFR 11 (L) >60 mL/min/1.73m*2    Calcium 9.0 8.6 - 10.6 mg/dL    Albumin 3.6 3.4 - 5.0 g/dL    Alkaline Phosphatase 49 33 - 110 U/L    Total Protein 5.9 (L) 6.4 - 8.2 g/dL    AST 35 9 - 39 U/L    Bilirubin, Total 0.7 0.0 - 1.2 mg/dL    ALT 6 (L) 7 - 45 U/L   Lactate Dehydrogenase   Result Value Ref Range     (H) 84 - 246 U/L    Magnesium   Result Value Ref Range    Magnesium 2.33 1.60 - 2.40 mg/dL   Fibrinogen   Result Value Ref Range    Fibrinogen 355 200 - 400 mg/dL   Calcium, Ionized   Result Value Ref Range    POCT Calcium, Ionized 1.12 1.1 - 1.33 mmol/L   Bilirubin, Direct   Result Value Ref Range    Bilirubin, Direct 0.2 0.0 - 0.3 mg/dL     *Note: Due to a large number of results and/or encounters for the requested time period, some results have not been displayed. A complete set of results can be found in Results Review.         Assessment/Plan   Assessment: Ms. 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    Originally presented to [facility] ED on 2/15/24 with complaints of N/V x 3 days and inability to keep medications down. POCUS revealed acute systolic heart failure w/ severe BIV dysfunction when compared to previous images in 11/2023. Also noted to have HIRAM requiring CVVH and transaminitis. Immunosuppression notably below therapeutic range. Admitted to HFICU and treated for suspected acute cellular vs. acute antibody mediated rejection; however, cardiac biopsy negative for signs of rejection. Despite rejection therapy, inotropes and MCS via IABP (2/18/24), the patient's end organ function continued to worsen without improvement in cardiac function. Ultimately placed on left femoral VA ECMO w/ Dr. Marina on 2/19/2024 and transferred to CTICU.     CTICU course complicated by anemia requiring blood product transfusions, epistaxis, volume overload & intubation (tachypnea and increased work of breathing 2/2 pulmonary edema). Status post extubation on 2/24/24. Now showing renal recovery off CVVH and cardiac  allograft recovery s/p ECMO decannulation on 2/29/24 w/ Dr. Witt.     She has been started on IVIG + therapeutic plasma exchange (03/05) - 1st of 5 sessions.     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)  Toxo: -/-   Hep C: -/-    Rejection/Prophylaxis (transplants):  - Steroids: Continue 10mg PO prednisone daily  - Tacrolimus: 3.0mg PO @ 0630 & 3.0mg PO @ 1830 w/ daily levels drawn @ 0600  - Serolimus: 2.5mg PO daily w/ daily levels drawn at 0600  - Tacrolimus goal troughs: 3-5  - Serolimus goal troughs: 7-9  - Combined sirolimus/tacrolimus goal of 10-12  - Antifungals: nystatin 500,000units Q6  - Antivirals: continue valcyte 450mg Q48hrs due to low grade viremia   - Anti PCP & Toxoplasmosis: Bactrim SS daily  --> Holding due to thrombocytopenia (2/23)    Last cardiac biopsy: 2/16/24 with ACR1 and no AMR  Last HLA: 2/16/24 negative for DSAs   Last RHC: 2/16/24 w/ RA 11, PAP 34/14(23), W 19 and C.I. 2.3  Last LHC: 2/18/24 negative for CAV and CAD   Last TTE/BOB: 3/1/24 shows LVEF to 30% and mild RV dysfunction (intra-op impella 5.5 insert)  Osteopenia/osteoporosis prophylaxis: Vitamin D3 and calcium supplements  Peptic/gastric ulcer prophylaxis: Pantoprazole 40mg daily   CAV Prophylaxis: Aspirin 81mg daily & pravastatin daily    - Unclear cause of acute severe graft dysfunction. Most likely smoldering ACR vs. AMR; however, 2xallograft biopsies on 2/16 & 2/20 remain negative for any significant pathology. Notably, both biopsies taken after rejection therapies implemented which may have reduced areas of graft damage.    - DSAs remain negative; however, patient may have non-HLA  antibodies present. 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   - Negative CAV & CAD via LHC on 2/18/24   - Completed methylpred steroid pulse w/ 1g Q24 x 3 days (2/16-2/18) and prednisone taper   - Thymoglobulin doses: 2/18 & 2/19   - IVIG + PLEX Session: 2/18, 2/20, 3/7 (planned)   - Extubated on 3/2/24.  - CVVH stopped on 2/27/24. HIRAM previously improving and autodiuresing; however, since ECMO decannulation, UOP has decreased and creatinine uptrending.    - Graft function slightly worse after transition to impella 5.5. IABP removed 3/1/24.  - On Milrinone 0.25mcg/kg/min. Impella up to P9. SGC removed 3/2/24. Team is trying to liberate from lines as able.     Recommendations:  - Maintain current Impella 5.5 P-9 support and milrinone gtt. Hold lasix today.   - Continue advanced therapy evaluation (dual heart/kidney transplant).   - Next IVIG and therapeutic plasma exchange scheduled for 03/07. HD session planned for today as well.   - Tacrolimus 3 mg po bid ON HOLD. Target tacrolimus trough level: 3-5  - C/w Sirolimus 2.5 mg po daily. Target sirolimus trough level: 7-9  - C/w Prednisone 10 mg po every day.   - Continue valcyte 450mg Q48hrs and continue to check CMV PCRs weekly (next due on 3/8/24)   - Restart systemic anticoagulation for Impella after discussion with CT surgery.    Patient was examined and discussed with HF attending, Dr. DENNIS Price.     Jason Burns DO  Advanced Heart Failure & Transplant Fellow

## 2024-03-07 NOTE — NURSING NOTE
Apheresis Nursing Note    Charla Bowles is a 34 y.o. female presenting for plasma exchange with AMR-cardiac.    Pre-Procedure Assessment  Pre-Procedure Assessment  Level of Consciousness: Alert  Orientation Level: Oriented X4  Cognition: Appropriate judgement, Appropriate safety awareness  Pain Score: 0 - No pain  Access Sites 'Okay to Use' Obtained: Yes  Access Site(s) Assessment: Yes  Estimated Replacement Volume: 3000  Vital Signs  Temp: 36.6 °C (97.9 °F)  Heart Rate: 141  Resp: 28  BP: 99/73  SpO2: 99 %  Pulse Oximetry Type: Continuous  Medical Gas Therapy: None (Room air)  Procedure Information and Time Out  Procedure Type: Therapeutic plasma exchange  Plasma Diagnosis: Antibody-mediated rejection, cardiac  TPE Procedure Number: 2  Volume Processed (L): 1.0  Fluid Balance: 95%  Albumin : Plasma Ratio: 0:100  TPE Time-Out Completed: Yes  Provider Time Out Completed with: Dr Theodore  Time Out Completed on: 03/07/24  Time Out Completed at: 1035  Equipment and Disposables  Apheresis Machine: Spectra Optia [id number]  Apheresis Machine PM in Date: Yes  Blood Warmer: Astotherm DNA [id number]  Blood Warmer PM in Date: Yes  Kit and Blood/Fluid Warmer Inspection: Tubing inspected with machine prime, Tubing inspected prior to connection to patient  Kit Type: Exchange kits  Kit Lot Number: [id number]  Kit Expiration Date: 11/01/25  ADC-A Used as Anticoagulant: Yes  ACD-A Lot #: [id number]  ACD-A Expiration Date: 10/01/25  9% Sodium Chloride Lot # (Liter): [id number]  9% Sodium Chloride Expiration Date (Liter): 05/31/25  Procedure Start  Start Time: 1050    Post-Procedure Assessment:  Post-Procedure  End Time: 1255  Waste Materials Collected for Research: No  Procedure Outcome: Treatment completed successfully  Adverse Event: No  Post-Procedure Line Assessment: Patent  Post-Procedure Access Care : Loaded/flushed per order, Caps changed, 'Do Not Flush' label applied  Vital Signs  Temp: 36.6 °C (97.9 °F)  Heart Rate: (!)  144  Resp: 24  BP: 98/69  SpO2: 99 %  Pulse Oximetry Type: Continuous  Medical Gas Therapy: None (Room air)  Post-Procedure Patient Fluid Values   Replacement Fluid Intake (mL): 3020 mL  AC Infused (mL): 50 mL  Rinseback Intake (mL): 155 mL  Ca+ Solution Intake (mL): 275 mL  Other Intake (mL): 0 mL  Apheresis Intake Total (mL): 3500 mL  Removed During Apheresis Output (mL): 3844 mL  AC in Bag Output (mL): 399 mL  Samples Output (mL): 15 mL  Apheresis Output Total (mL): 3460 mL  Inlet Volume Processed (mL): 5386 mL  Net Difference (mL): 40 mL  Disposition  Patient's Condition at End of Procedure: Stable  Disposition: N/A - procedure performed at bedside  Transferred via:  (N/A PLEX at bedside)  Report Given to: Floor Nurse  $ Apheresis Charge: Therapeutic plasma exchange    Raman Martin RN

## 2024-03-07 NOTE — SIGNIFICANT EVENT
2015 pt OOB to toilet, demanding enema despite refusal of mirilax and senna X4 days. Soap suds enema ordered and administered x2 while pt sitting on commode, large black BM

## 2024-03-07 NOTE — POST-PROCEDURE NOTE
This is a 34 y.o. female with antibody-mediated rejection of heart transplant who underwent therapeutic plasma exchange (TPE) with 100% plasma as replacement fluid.     I saw and evaluated the patient during the TPE.  The patient was resting in bed.  The vascular access functioned well.     Pre-procedure labs:  WBC   Date/Time Value Ref Range Status   03/07/2024 05:41 AM 10.4 4.4 - 11.3 x10*3/uL Final     Hemoglobin   Date/Time Value Ref Range Status   03/07/2024 05:41 AM 7.5 (L) 12.0 - 16.0 g/dL Final     Hematocrit   Date/Time Value Ref Range Status   03/07/2024 05:41 AM 22.6 (L) 36.0 - 46.0 % Final     Platelets   Date/Time Value Ref Range Status   03/07/2024 05:41  150 - 450 x10*3/uL Final        POCT Calcium, Ionized   Date/Time Value Ref Range Status   03/07/2024 01:28 PM 1.09 (L) 1.1 - 1.33 mmol/L Final     Comment:     The performance characteristics of ionized calcium tested  in heparinized plasma or serum have been validated by the  individual  laboratory site where testing is performed.   Testing on heparinized plasma or serum is not approved by   the FDA; however, such approval is not necessary.        Protime   Date/Time Value Ref Range Status   03/07/2024 05:41 AM 14.8 (H) 9.8 - 12.8 seconds Final     INR   Date/Time Value Ref Range Status   03/07/2024 05:41 AM 1.3 (H) 0.9 - 1.1 Final     aPTT   Date/Time Value Ref Range Status   03/07/2024 05:41 AM 27 27 - 38 seconds Final     Fibrinogen   Date/Time Value Ref Range Status   03/07/2024 05:41  200 - 400 mg/dL Final        Pre-procedure vital signs at 1030:  T: 36.6 C, HR: 141, RR: 28, BP 99/73  Pulse oximetry: 99% on room air    Post-procedure vital signs at 1255:  T: 36.6 C, HR: 144, RR: 24, BP: 98/69  Pulse oximetry: 99% on room air       Outcome: TPE completed.   Adverse Reaction: No    Assessment and Plan:  34 y.o. female with  antibody-mediated rejection of heart transplant  who underwent TPE #2/5.  Draw post-procedure labs  Vascular  access to be managed by clinical team.  Next TPE #3 is schedule for 3/8/24.

## 2024-03-07 NOTE — PROGRESS NOTES
Occupational Therapy    Occupational Therapy Treatment    Name: Charla Bowles  MRN: [id number]  : 1990  Date: 24  Time Calculation  Start Time: 1511  Stop Time: 1557  Time Calculation (min): 46 min    Assessment:  Evaluation/Treatment Tolerance: Patient tolerated treatment well  End of Session Communication: Bedside nurse  End of Session Patient Position: Up in chair, Alarm off, not on at start of session  Plan:  Treatment Interventions: ADL retraining, Functional transfer training, UE strengthening/ROM, Endurance training, Cognitive reorientation, Patient/family training, Equipment evaluation/education, Neuromuscular reeducation, Compensatory technique education  OT Frequency: 4 times per week  OT Discharge Recommendations: High intensity level of continued care  Equipment Recommended upon Discharge:  (TBD)  OT Recommended Transfer Status: Assist of 1  OT - OK to Discharge: Yes    Subjective   General:  OT Last Visit  OT Received On: 24  Prior to Session Communication: Bedside nurse  Patient Position Received: Bed, 3 rail up  Family/Caregiver Present: No  General Comment: pt alert and agreeable to therapy. milrinone .25. R axillary impella: (P-9), flow: 5.2. site examined pre and post mobility, secure and intact   Precautions:  Medical Precautions: Fall precautions, Cardiac precautions  Precautions Comment: MAP 70-90, protective precautions, R axillary impella precautions (no pushing/pulling, no shoulder flexion or abduction >90 degrees)  Vitals:  Vital Signs  Heart Rate: (!) 141 (highest HR with mobility: 150s, post: 143)  SpO2: 99 % (post: 97)  BP: 101/79 (post: 107/86)  MAP (mmHg): 89 (post: 93)  Lines/Tubes/Drains:  CVC 24 Triple lumen Right Internal jugular (Active)   Number of days: 2       Introducer 24 Internal jugular Right (Active)   Number of days: 20       Ventricular Assist Device Impella 5.5 (Active)   Number of days: 5       Urethral Catheter (Active)   Number  of days: 3       Cognition:  Orientation Level: Oriented X4  Cognition Comments: Pt able to identify need for rest breaks throughout session and able to report R axillary impella movement precautions at start of session. Needed one verbal cue to maintain precautions throughout session.  Insight: Mild  Impulsive: Mildly    Pain Assessment:  Pain Assessment  Pain Assessment: 0-10  Pain Score: 0 - No pain     Objective     Bed Mobility/Transfers:     Bed Mobility 1  Bed Mobility 1: Supine to sitting  Level of Assistance 1: Moderate assistance  Bed Mobility Comments 1: MODx2, draw sheet, HOB elevated. increased assist per pt request    Transfers  Transfer: Yes  Transfer 1  Transfer From 1: Sit to  Transfer to 1: Stand  Technique 1: Sit to stand  Transfer Device 1: Walker  Transfer Level of Assistance 1: Minimum assistance  Trials/Comments 1: pt performed x2 sit>stand transfers throughout session. pt required cues for hand placement and to maintain axillary impella activity restrictions for one trial.      Balance:  Static Sitting Balance  Static Sitting-Level of Assistance: Close supervision  Static Sitting-Comment/Number of Minutes: pt sat EOB and performed ankle pumps prior to progressing mobility  Static Standing Balance  Static Standing-Level of Assistance: Contact guard    Therapy/Activity:   Therapeutic Exercise  Therapeutic Exercise Performed: Yes  Therapeutic Exercise Activity 1: pt was provided yellow theraputty for R hand and educated on necessity of maintaining distal strength through that extremity d/t decreased use from impella placement. pt educated on fisted grasp, pincer grasp, finger thumb opposition, and thumb flexion exercises with return demonstration and completion of x5 reps per exercise. Pt educated on HEP dosage. pt verbalized understanding of HEP.  Therapeutic Activity  Therapeutic Activity Performed: Yes  Therapeutic Activity 1: pt ambulated >300ft to improve activity tolerance and functional  mobility. pt required CGA, walker, and chair follow for safety. Pt required MIN A to correct minor LOB 2X throughout ambulation. Pt required 4 standing rest breaks 2/2 fatigue. Pt reported RPE as 4/10. pt reported feeling her R knee felt weak throughout ambulation.       Outcome Measures:  [facility] Daily Activity  Putting on and taking off regular lower body clothing: A lot  Bathing (including washing, rinsing, drying): A lot  Putting on and taking off regular upper body clothing: A lot  Toileting, which includes using toilet, bedpan or urinal: Total  Taking care of personal grooming such as brushing teeth: A little  Eating Meals: A little  Daily Activity - Total Score: 13     Education Documentation  Body Mechanics, taught by CARLIE Stovall at 3/7/2024  4:18 PM.  Learner: Patient  Readiness: Acceptance  Method: Explanation, Demonstration  Response: Demonstrated Understanding, Verbalizes Understanding    Precautions, taught by CARLIE Stovall at 3/7/2024  4:18 PM.  Learner: Patient  Readiness: Acceptance  Method: Explanation, Demonstration  Response: Demonstrated Understanding, Verbalizes Understanding    Home Exercise Program, taught by CARLIE Stovall at 3/7/2024  4:18 PM.  Learner: Patient  Readiness: Acceptance  Method: Explanation, Demonstration  Response: Demonstrated Understanding, Verbalizes Understanding    ADL Training, taught by CARLIE Stovall at 3/7/2024  4:18 PM.  Learner: Patient  Readiness: Acceptance  Method: Explanation, Demonstration  Response: Demonstrated Understanding, Verbalizes Understanding    Education Comments  No comments found.        Goals:  Encounter Problems       Encounter Problems (Active)       ADLs       Patient will complete daily grooming tasks in standing with LRAD with supervision level of assistance and PRN adaptive equipment. (Progressing)       Start:  03/01/24    Expected End:  03/18/24               ADLs       Patient with complete lower body dressing  with stand by assist level of assistance donning and doffing all LE clothes  with PRN adaptive equipment (Progressing)       Start:  03/04/24    Expected End:  03/18/24            Patient will complete toileting including hygiene clothing management/hygiene with stand by assist level of assistance. (Progressing)       Start:  03/04/24    Expected End:  03/18/24               BALANCE       Pt will increase dynamic standing tolerance to >10 min with supervision using LRAD during functional mobility/ADLs without LOB in order to improve activity tolerance and balance for self-care tasks.  (Progressing)       Start:  03/01/24    Expected End:  03/18/24                 COGNITION/SAFETY       Patient will participate in cognitive activities to demonstrate WFL score on further cognitive assessments, including Medi-Cog, MoCA and remain A&O x3, CAM (-).  (Progressing)       Start:  03/01/24    Expected End:  03/18/24               EXERCISE/STRENGTHENING       Patient will be educated on BUE HEP for increased ADL/IADL performance. (Progressing)       Start:  03/01/24    Expected End:  03/18/24               MOBILITY       Patient will perform Functional mobility max Household distances/Community Distances with supervision level of assistance and least restrictive device in order to improve safety and functional mobility. (Progressing)       Start:  03/01/24    Expected End:  03/18/24 03/07/24 at 4:47 PM   NORMAN ESPITIA S-OT   [phone]

## 2024-03-07 NOTE — PROGRESS NOTES
"7/7 CTICU     OVERVIEW  03/2022 (OHT) Heart Transplant presented to [facility] ED - heart biopsy with rejection. 3/1 - OR s/p Impella 5.5 insertion in R axillary artery and removal of IABP. 3/4 - Central line placed for HD. 3/7 C: Advanced therapies - \"Continue advanced therapy evaluation (dual heart/kidney transplant).\"  -- estimate plan will be for patient to return to HF ICU.       DC PLAN  TBD - Care Transitions is following to develop a safe & supportive discharge plan in collaboration with TRANSPLANT & multidisciplinary teams (along with) patient/family/significant others.       PAYOR  [facility]      PT/OT  As of most recent intervention, 3/4 OT made a rec of high intensity & 3/6 PT = high.     COMPLETED  (X) Daily ongoing review of patient via chart and/or (M-F) IDT rounds     Claribel Diaz (LSW, MSW)      "

## 2024-03-07 NOTE — PROGRESS NOTES
"Charla Bowles is a 34 y.o. female on day 21 of admission presenting with Cardiogenic shock (CMS/HCC).    Subjective:  No nausea or vomiting with iron infusion.  Getting PLEX. Only 150 ml of urine with lasix 80 mg IV x 2. Net positive 700 ml over the last 24 hours. Denies SOB, remains on RA.     Last Recorded Vitals  Blood pressure 101/79, pulse (!) 124, temperature 36.6 °C (97.9 °F), temperature source Skin, resp. rate 24, height 1.549 m (5' 0.98\"), weight 92.4 kg (203 lb 11.3 oz), SpO2 99 %.  Intake/Output last 3 Shifts:  I/O last 3 completed shifts:  In: 2196.3 (23.8 mL/kg) [P.O.:1200; I.V.:996.3 (10.8 mL/kg)]  Out: 260 (2.8 mL/kg) [Urine:210 (0.1 mL/kg/hr); Emesis/NG output:50]  Weight: 92.4 kg     General: No distress   CVS: S1 S2 no murmurs  RESP:  Lungs CTAB on RA  ABDO: Soft, non-tender   Neuro: A + O x 3  Skin: No rash   Extremities: No edema   Impella in place, getting PLEX  Right Int jug trialysis     Labs:  Results for orders placed or performed during the hospital encounter of 02/15/24 (from the past 24 hour(s))   POCT GLUCOSE   Result Value Ref Range    POCT Glucose 161 (H) 74 - 99 mg/dL   CBC and Auto Differential   Result Value Ref Range    WBC 10.4 4.4 - 11.3 x10*3/uL    nRBC 0.8 (H) 0.0 - 0.0 /100 WBCs    RBC 2.66 (L) 4.00 - 5.20 x10*6/uL    Hemoglobin 7.5 (L) 12.0 - 16.0 g/dL    Hematocrit 22.6 (L) 36.0 - 46.0 %    MCV 85 80 - 100 fL    MCH 28.2 26.0 - 34.0 pg    MCHC 33.2 32.0 - 36.0 g/dL    RDW 14.6 (H) 11.5 - 14.5 %    Platelets 163 150 - 450 x10*3/uL    Neutrophils % 73.6 40.0 - 80.0 %    Immature Granulocytes %, Automated 2.7 (H) 0.0 - 0.9 %    Lymphocytes % 6.7 13.0 - 44.0 %    Monocytes % 15.5 2.0 - 10.0 %    Eosinophils % 1.2 0.0 - 6.0 %    Basophils % 0.3 0.0 - 2.0 %    Neutrophils Absolute 7.68 1.20 - 7.70 x10*3/uL    Immature Granulocytes Absolute, Automated 0.28 0.00 - 0.70 x10*3/uL    Lymphocytes Absolute 0.70 (L) 1.20 - 4.80 x10*3/uL    Monocytes Absolute 1.62 (H) 0.10 - 1.00 " x10*3/uL    Eosinophils Absolute 0.13 0.00 - 0.70 x10*3/uL    Basophils Absolute 0.03 0.00 - 0.10 x10*3/uL   Coagulation Screen   Result Value Ref Range    Protime 14.8 (H) 9.8 - 12.8 seconds    INR 1.3 (H) 0.9 - 1.1    aPTT 27 27 - 38 seconds   Comprehensive Metabolic Panel   Result Value Ref Range    Glucose 132 (H) 74 - 99 mg/dL    Sodium 133 (L) 136 - 145 mmol/L    Potassium 4.6 3.5 - 5.3 mmol/L    Chloride 92 (L) 98 - 107 mmol/L    Bicarbonate 25 21 - 32 mmol/L    Anion Gap 21 (H) 10 - 20 mmol/L    Urea Nitrogen 80 (H) 6 - 23 mg/dL    Creatinine 4.98 (H) 0.50 - 1.05 mg/dL    eGFR 11 (L) >60 mL/min/1.73m*2    Calcium 9.0 8.6 - 10.6 mg/dL    Albumin 3.6 3.4 - 5.0 g/dL    Alkaline Phosphatase 49 33 - 110 U/L    Total Protein 5.9 (L) 6.4 - 8.2 g/dL    AST 35 9 - 39 U/L    Bilirubin, Total 0.7 0.0 - 1.2 mg/dL    ALT 6 (L) 7 - 45 U/L   Lactate Dehydrogenase   Result Value Ref Range     (H) 84 - 246 U/L   Magnesium   Result Value Ref Range    Magnesium 2.33 1.60 - 2.40 mg/dL   Tacrolimus level   Result Value Ref Range    Tacrolimus  3.5 <=15.0 ng/mL   Sirolimus level   Result Value Ref Range    Sirolimus  8.5 4.0 - 20.0 ng/mL   Fibrinogen   Result Value Ref Range    Fibrinogen 355 200 - 400 mg/dL   Calcium, Ionized   Result Value Ref Range    POCT Calcium, Ionized 1.12 1.1 - 1.33 mmol/L   Bilirubin, Direct   Result Value Ref Range    Bilirubin, Direct 0.2 0.0 - 0.3 mg/dL   Prepare Plasma Exchange: 10 Units   Result Value Ref Range    PRODUCT CODE R5646W45     Unit Number [id number]     Unit ABO O     Unit RH POS     Dispense Status XM     Blood Expiration Date March 11, 2024 15:30 EDT     PRODUCT BLOOD TYPE 5100     UNIT VOLUME 303     PRODUCT CODE S6495T26     Unit Number [id number]     Unit ABO O     Unit RH POS     Dispense Status XM     Blood Expiration Date March 12, 2024 13:55 EDT     PRODUCT BLOOD TYPE 5100     UNIT VOLUME 333     PRODUCT CODE Z4539V03     Unit Number [id number]     Unit  ABO O     Unit RH POS     Dispense Status XM     Blood Expiration Date March 12, 2024 13:55 EDT     PRODUCT BLOOD TYPE 5100     UNIT VOLUME 368     PRODUCT CODE Z7480J67     Unit Number [id number]     Unit ABO O     Unit RH POS     Dispense Status XM     Blood Expiration Date March 12, 2024 13:55 EDT     PRODUCT BLOOD TYPE 5100     UNIT VOLUME 331     PRODUCT CODE K1312P87     Unit Number [id number]     Unit ABO O     Unit RH POS     Dispense Status XM     Blood Expiration Date March 12, 2024 13:55 EDT     PRODUCT BLOOD TYPE 5100     UNIT VOLUME 332     PRODUCT CODE R5419V37     Unit Number [id number]     Unit ABO O     Unit RH POS     Dispense Status XM     Blood Expiration Date March 12, 2024 13:55 EDT     PRODUCT BLOOD TYPE 5100     UNIT VOLUME 345     PRODUCT CODE P3403L83     Unit Number [id number]     Unit ABO O     Unit RH POS     Dispense Status XM     Blood Expiration Date March 12, 2024 13:55 EDT     PRODUCT BLOOD TYPE 5100     UNIT VOLUME 336     PRODUCT CODE W2597Y68     Unit Number [id number]     Unit ABO O     Unit RH POS     Dispense Status XM     Blood Expiration Date March 12, 2024 13:55 EDT     PRODUCT BLOOD TYPE 5100     UNIT VOLUME 337     PRODUCT CODE S4864F36     Unit Number [id number]     Unit ABO O     Unit RH POS     Dispense Status XM     Blood Expiration Date March 12, 2024 13:55 EDT     PRODUCT BLOOD TYPE 5100     UNIT VOLUME 318    CBC   Result Value Ref Range    WBC 10.8 4.4 - 11.3 x10*3/uL    nRBC 0.4 (H) 0.0 - 0.0 /100 WBCs    RBC 2.59 (L) 4.00 - 5.20 x10*6/uL    Hemoglobin 7.7 (L) 12.0 - 16.0 g/dL    Hematocrit 23.1 (L) 36.0 - 46.0 %    MCV 89 80 - 100 fL    MCH 29.7 26.0 - 34.0 pg    MCHC 33.3 32.0 - 36.0 g/dL    RDW 14.9 (H) 11.5 - 14.5 %    Platelets 180 150 - 450 x10*3/uL   Calcium, Ionized   Result Value Ref Range    POCT Calcium, Ionized 1.09 (L) 1.1 - 1.33 mmol/L   B-Type Natriuretic Peptide   Result Value Ref Range    BNP 4,683 (H) 0 - 99 pg/mL    Cytomegalovirus IgG   Result Value Ref Range    Cytomegalovirus IgG Reactive (A) Nonreactive   Cresencio-Barr Virus Antibody Panel   Result Value Ref Range    EBV VCA, IgG  Positive (A) Negative    EBV VCA, IgM  Negative Negative    EBV Early Antigen Antibody, IgG Equivocal (A) Negative    EBV Nuclear Antigen Antibody, IgG Positive (A) Negative   Hepatitis A Antibody, Total   Result Value Ref Range    Hepatitis A  AB-Total Reactive (A) Nonreactive   Hepatitis B Core Antibody, Total   Result Value Ref Range    Hepatitis B Core AB- Total Nonreactive Nonreactive   Hepatitis B Surface Antibody   Result Value Ref Range    Hepatitis B Surface .1 (H) <10.0 mIU/mL   Hepatitis B Surface Antigen   Result Value Ref Range    Hepatitis B Surface AG Nonreactive Nonreactive   Hepatitis C Antibody   Result Value Ref Range    Hepatitis C AB Nonreactive Nonreactive   Hemoglobin A1C   Result Value Ref Range    Hemoglobin A1C 5.7 (H) see below %    Estimated Average Glucose 117 Not Established mg/dL   HIV 1/2 Antigen/Antibody Screen with Reflex to Confirmation   Result Value Ref Range    HIV 1/2 Antigen/Antibody Screen with Reflex to Confirmation Nonreactive Nonreactive   HSV1 IgG and HSV2 IgG   Result Value Ref Range    HSV 1, IgG 6.9 (H) <0.9 INDEX    HSV 2, IgG 1.8 (H) <0.9 INDEX   Human Chorionic Gonadotropin, Serum Quantitative   Result Value Ref Range    HCG, Beta-Quantitative <3 <5 mIU/mL   Mumps Antibody, IgG   Result Value Ref Range    Mumps, IgG Positive (A) Negative    Mumps, IgG Index 2.9 (H) <=0.8 IA   Prealbumin   Result Value Ref Range    Prealbumin 19.1 18.0 - 40.0 mg/dL   Prostate Specific Antigen, Screen   Result Value Ref Range    Prostate Specific Antigen,Screen 0.37 (H) <=0.10 ng/mL   Rubella Antibody, IgG   Result Value Ref Range    Rubella, IgG Positive Negative    Rubella, IgG Index 5.1 <=0.7 IA IA   Rubeola Antibody, IgG   Result Value Ref Range    Rubeola, IgG Positive     Rubeola, IgG Index 5.6 (H)  "<=0.8 IA   Syphilis Screen with Reflex   Result Value Ref Range    Syphilis Total Ab Nonreactive Nonreactive   Triiodothyronine, Total   Result Value Ref Range    Triiodothyronine 60 60 - 200 ng/dL   Thyroxine, Total   Result Value Ref Range    Thyroxine 8.7 4.5 - 11.1 ug/dL   Toxoplasma IgG   Result Value Ref Range    Toxoplasma IgG Nonreactive Nonreactive   Varicella Zoster Antibody, IgG   Result Value Ref Range    Varicella Zoster, IgG Positive (A) Negative    Varicella Zoster, IgG Index 6.0 (H) <=0.8 IA   T4, free   Result Value Ref Range    Thyroxine, Free 1.37 0.78 - 1.48 ng/dL   TSH   Result Value Ref Range    Thyroid Stimulating Hormone 14.87 (H) 0.44 - 3.98 mIU/L   Magnesium   Result Value Ref Range    Magnesium 2.26 1.60 - 2.40 mg/dL   Coagulation Screen   Result Value Ref Range    Protime 14.1 (H) 9.8 - 12.8 seconds    INR 1.3 (H) 0.9 - 1.1    aPTT 32 27 - 38 seconds   Renal function panel   Result Value Ref Range    Glucose 174 (H) 74 - 99 mg/dL    Sodium 133 (L) 136 - 145 mmol/L    Potassium 4.4 3.5 - 5.3 mmol/L    Chloride 91 (L) 98 - 107 mmol/L    Bicarbonate 27 21 - 32 mmol/L    Anion Gap 19 10 - 20 mmol/L    Urea Nitrogen 79 (H) 6 - 23 mg/dL    Creatinine 5.17 (H) 0.50 - 1.05 mg/dL    eGFR 11 (L) >60 mL/min/1.73m*2    Calcium 10.1 8.6 - 10.6 mg/dL    Phosphorus 5.9 (H) 2.5 - 4.9 mg/dL    Albumin 3.3 (L) 3.4 - 5.0 g/dL   Light Blue Top   Result Value Ref Range    Extra Tube Hold for add-ons.    Light Blue Top   Result Value Ref Range    Extra Tube Hold for add-ons.    Vascular US carotid artery duplex bilateral   Result Value Ref Range    BSA 1.99 m2     *Note: Due to a large number of results and/or encounters for the requested time period, some results have not been displayed. A complete set of results can be found in Results Review.         Assessment/Plan     Charla Silvagarry Bowles is a 33 y.o. with a history of orthotic heart transplant as \"March 2022 with postoperative course complicated by " upper extremity DVT, asymptomatic 2R rejection in November 2022 who currently got admitted with nausea vomiting and markedly reduced ejection fraction 35%, low cardiac index with elevated filling pressures concerning for cardiogenic shock.       HIRAM on CKD stage 3: (baseline Cr 1.2), oliguric   -HIRAM in the setting of CRS, cardiogenic shock.  Started on CRRT on 2/19/2024 for volume management. Discontinued 2/27.  Clearance remains impaired. Last iHD 3/5   - oliguric despite IV diuretics  - volume status: euvolemic   - +700mL in the last 24 hours  - has right int jug trialysis    Plan:  - Plan for iHD, goal 1-2 L UD  - Monitor daily RFP.   - Will evaluate labs and volume status daily for HD indications.   - Continue strict Is/Os    # anemia:   -  continue  venofer 200 mg IV daily for 5 days  - benadryl or zofran 30 mins before  venofer.   - continue aranesp  q2 weeks    #BMD:   - calcium WNL and phos elevated but improving     # Immunosuppression:   As per the heart transplant team     # electrolytes  - hyponatremia secondary to impaired free water clearance from kidney injury, also could be falsely low from IVIG     Cardiogenic shock, off pressors  -S/p endomyocardial biopsies on 2/16 and 2/20 without definitive findings but pt treated for presumed rejection.  DSA negative so far. S/p pulse methylprednisolone 1 g for 3 days from 2/16- 2/18, Thymoglobulin -2 doses given on 2/18 and 2/19, IV immunoglobulin plus PLEX sessions done on 2/18 and 2/20.  - planned for endometrial biopsy 3/6  -s/p [facility] ECMO and on IABP support till 3/1/24. Currently with Impella 3/1/24.  - planned for IVIG and PLEX, next PLEX (3/5) 3/8    Kuldip Hernaneds MD  Nephrology fellow

## 2024-03-07 NOTE — PROGRESS NOTES
CTICU Attending Assessment Note    This is a 33 yoF who is ~1 year post-heart transplant presenting with acute rejection in cardiogenic shock who requiring MCS with ECMO but now decannulated continuing to require support with Impella.    Neuro: Awake, alert, oriented x3. CAM negative. Continue PO/topical multimodal pain therapy to adequately control pain. Continue CAM assessment and encourage restful sleep per ICU protocols.  Continue to promote mobility. Continue trazadone and melatonin for sleep promotion.  ENT: Epistaxis has resolved and packing removed.   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 Will continue IVIG and Plasma exchange to complete course for concern for rejection. Goal MAP 70-90 and CI >2.2. Persistent tachycardia of unclear etiology without signs of malperfusion, may be in part due to milrinone but continues to require inotropy. EKG with sinus tachycardia without arrhythmia. Continue afterload reduction with hydralazine and isordil with goal MAP 70-90. Appreciate HF recommendations. Continue ASA and statin.  Pulm: Currently on RA. Continue aggressive pulmonary toilet with clear CXR.  Renal: Acute renal failure with oliguria now on iHD and will continue to aim net negative fluid balance. Strict I&O with goal UOP >0.5 mL/kg/hr.  Electrolyte protocol.  GI: Continue tolerating diet and will change to renal diet today. Continue bowel regimen.  Heme: Acute postoperative blood loss anemia with goal Hgb >7 and platelets >50. No ongoing signs of bleeding today given resolution of epistaxis and will continue to follow CBC. Continue ongoing discussions to potentially restart heparin gtts.  Endo: Will continue SSI protocol with goal -180 per post-cardiotomy goal. Continue levothyroxine given hx of hypothyroidism.  ID: No other indications for antibiotics at this time. Continue tacrolimus, valacyclovir, prednisone, sirolimus and  appreciate HF recommendations regarding immunosuppression. Continue for IVIG and plasma exchange today per HF and transfusion medicine.    Skin/MSK: Surgical site is C/D/I.  Continue to promote mobility.  Prophy: Continue SCD, SQH, PPI.  A-F Bundle reviewed and addressed as above with multidisciplinary rounds completed at bedside.  Dispo: Continue CTICU care.    Due to the high probability of life threatening clinical decompensation, the patient required critical care time evaluating and managing this patient.  Critical care time included obtaining a history, examining the patient, ordering and reviewing studies, discussing, developing, and implementing a management plan, evaluating the patient's response to treatment, and discussion with other care team providers. I saw and evaluated the patient myself. I reviewed the provider's documentation and discussed the patient with the provider. Critical care time was performed exclusive of billable procedures.    Critical Care Time: 33 minutes    Feliciano Lee MD  Emergency Critical Care Attending Physician

## 2024-03-07 NOTE — PROGRESS NOTES
CTICU Progress Note  Charla Bowles/[id number]    Admit Date: 2/15/2024  Hospital Length of Stay: 21   ICU Length of Stay: 16d 19h   CT SURGEON:     SUBJECTIVE:   Overnight no acute events, remained in sinus tach and oliguric.    MEDICATIONS  Infusions:  lactated Ringer's, Last Rate: 5 mL/hr (03/07/24 1100)  lactated Ringer's, Last Rate: Stopped (03/03/24 1200)  milrinone, Last Rate: 0.25 mcg/kg/min (03/07/24 0632)  sodium bicarbonate infusion Impella Purge 25 mEq/1000 mL D5W      Scheduled:  acetaminophen, 650 mg, Once  aspirin, 81 mg, Daily  calcitriol, 0.5 mcg, Daily  darbepoetin floyd, 100 mcg, q14 days  diphenhydrAMINE, 25 mg, Once  [Held by provider] ferrous sulfate (325 mg ferrous sulfate), 65 mg of iron, Daily with breakfast  heparin (porcine), 5,000 Units, q8h  heparin, 1,000 Units, Once  heparin, 1,000 Units, Once  hydrALAZINE, 100 mg, q8h  immune globulin (human), 10 g, Once  insulin lispro, 0-10 Units, Before meals & nightly  iron sucrose, 200 mg, Daily  isosorbide dinitrate, 40 mg, q8h  levothyroxine, 250 mcg, Daily  melatonin, 10 mg, Nightly  multivitamin with minerals, 1 tablet, Daily  nystatin, 5 mL, q6h  pantoprazole, 40 mg, Daily before breakfast  polyethylene glycol, 17 g, BID  predniSONE, 10 mg, Daily  rosuvastatin, 40 mg, Nightly  sennosides-docusate sodium, 2 tablet, BID  sirolimus, 2.5 mg, Daily  sodium chloride, 5 spray, 4x daily  [Held by provider] sulfamethoxazole-trimethoprim, 80 mg of trimethoprim, Daily  [Held by provider] tacrolimus, 3 mg, q12h TRICIA  traZODone, 50 mg, Nightly  valGANciclovir, 450 mg, q48h      PRN:  acetaminophen, 975 mg, q8h PRN  bisacodyl, 10 mg, Daily PRN  calcium gluconate, 1 g, q6h PRN  calcium gluconate, 2 g, q6h PRN  dextrose, 25 g, q15 min PRN  dextrose, 25 g, q15 min PRN   Or  glucagon, 1 mg, q15 min PRN  diphenhydrAMINE, 25 mg, q5 min PRN  glucagon, 1 mg, q15 min PRN  hydrALAZINE, 20 mg, q4h PRN  artificial tears, 2 drop, PRN  magnesium sulfate, 1 g, q6h  "PRN  magnesium sulfate, 2 g, q6h PRN  microfibrllar collagen, , PRN  mupirocin, , BID PRN  ondansetron, 4 mg, q4h PRN  oxyCODONE, 5 mg, q4h PRN  sodium chloride, 1 spray, 4x daily PRN        PHYSICAL EXAM:   Visit Vitals  BP 99/66   Pulse (!) 138   Temp 36.6 °C (97.9 °F)   Resp (!) 28   Ht 1.549 m (5' 0.98\")   Wt 92.4 kg (203 lb 11.3 oz)   SpO2 99%   BMI 38.51 kg/m²   OB Status Hysterectomy   Smoking Status Never   BSA 1.99 m²     Wt Readings from Last 5 Encounters:   03/06/24 92.4 kg (203 lb 11.3 oz)   12/07/23 92.1 kg (203 lb)   12/01/23 93 kg (205 lb)   11/29/23 92.9 kg (204 lb 12.8 oz)   11/09/23 91.3 kg (201 lb 3.2 oz)     INTAKE/OUTPUT:  I/O last 3 completed shifts:  In: 2196.3 (23.8 mL/kg) [P.O.:1200; I.V.:996.3 (10.8 mL/kg)]  Out: 260 (2.8 mL/kg) [Urine:210 (0.1 mL/kg/hr); Emesis/NG output:50]  Weight: 92.4 kg        Physical Exam:   - CONSTITUTION: Young appearing female with impella 5.5  - NEUROLOGIC: intact.  Alert, following all commands. OOB to chair.   - CARDIOVASCULAR: R axillary impella, no bleeding at site.  P9, 5.2L flow. Sinus tachycardia.  - RESPIRATORY: clear on RA.  No secretions  - GI: soft, obese. Hyperactive BS  - : oliguric   - EXTREMITIES:  Palpable pulses throughout  - SKIN: intact  - PSYCHIATRIC: calm    Daily Risk Screen  Central line: RIJ trialysis, Maintain for inotropic support    Images: CXR w/ mild pulmonary congestion    Invasive Hemodynamics:    Most Recent Range Past 24hrs   BP (Art) 133/102 No data recorded   MAP(Art) 110 mmHg No data recorded   RA/CVP   No data recorded   PA 27/19 No data recorded   PA(mean) 22 mmHg No data recorded   CO 4.9 L/min No data recorded   CI 2.5 L/min/m2 No data recorded   Mixed Venous 63 % No data recorded   SVR  1217 (dyne*sec)/cm5 No data recorded         Assessment/Plan   33 year old female with past medical history of heart transplant in March 2022 with postoperative course complicated by upper extremity/internal jugular DVTs, and " 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 She was admitted to HFICU on 2/15 with concern for cardiogenic shock secondary to allograft rejection and decompensated heart failure with multiorgan dysfunction including significant elevation of liver enzymes and nonoliguric acute kidney injury. Endomyocardial biopsy on 2/16 revealed mild ACR with +CD4s and negative HLAs, however multisystem organ failure persisted with increased inotropic requirements. IABP placed on 2/18. Transferred to CTICU on 2/19 for [facility]O cannulation with Dr. Marina for persistent multi-organ system dysfunction. Intubated 2/21 for respiratory failure 2/2 pulmonary edema, extubated 2/24. ECMO de-cannulated 2/29/24 but had worsening HIRAM and overall declined clinical status and IABP was transitioned to R axillary impella 5.5 3/1.      Plan:  NEURO: No history. Neuro intact with minimal pain. Previous insomnia improved. OOB to chair daily with PT -->  - Serial neuro and pain assessments  - PRN Tylenol 975 mg Q8 PRN  - Continue oxy 5mg Q4 PRN.   - PT/OT Consult  - Continue scheduled melatonin 10 mg nightly and Trazodone 50 mg PRN for insomnia  - CAM ICU score qshift. Sleep/wake cycle hygiene     ENT: Significant epistaxis 2/28 and 3/3 requiring ENT consult. Packing again removed by ENT 3/5. No current evidence of epistaxis.  - [place] spray 5x day x10 days from replete bleeding  - prn ocean spray and mupiricin for dry nasal passages  - discontinue Keflex     CV: Patient has a history of heart transplant in March of 2022 with TTE on 11/29 with LVEF 60-65%. Admitted for cardiogenic shock with concern for acute cellular rejection s/p IABP placement (R fem) 2/18 and [facility] ECMO (left fem) 2/19. S/p ECMO decannulation 2/29 with subsequent decline now s/p impella 5.5 and IABP removal 3/1. ECHO 3/1 with EF 30-35% with persisting RV dysfunction.  Currently on milrinone 0.25 mcg/kg/min with impella at P9/5.2L with stable end organ perfusion.  Tachycardia persisting,  unchanged despite repeated changes in therapies.  -->  - Maintain goal MAP 70-90  - Continue full impella support P9  - continue milrinone 0.25  - PO hydralazine 100mg q8h and PRN hydral 20 mg IVP for MAP >90  - Continue isosorbide to 40mg every 8 hours  - Continue home rosuvastatin 40mg daily  - Continue ASA 81 mg daily  - Hold home amlodipine  - Continuing advanced therapy work-up  - follow up on CT Chest completed 3/7 as part of transplant workup     PULM: No history. Acute respiratory failure now resolved, intubated 2/21-2/24. Reintubated for OR 3/1 and extubated 3/2.  On RA -->  - Daily CXR  - Maintain SPO2 > 92%    GI: History of gastric bypass and MMF colitis.  Shock liver resolved. Constipation resolved after enema. Tolerating oral diet but poor appetite.  -->  - Continue home PPI, calcitriol 0.5mg daily, multivitamin, calcium carbonate 1,250mg BID  - continue diet  - Continue miralax BID & senna-colace BID.      : No history, baseline Cr 1.2-1.3.  HIRAM originally on CVVH then with renal recovery. Worsened HIRAM after ECMO decannulation now on iHD, last 3/6.  Oliguric and hypervolemic. Stable hyponatremia.   -->  - RFP as clinically indicated, replete electrolytes per CTICU protocol.  Holding potassium repletion  - holding Lasix 80mg BID  - iHD again today after PLEX with 2L UF  - nephrology consult  - pull wheatley when clinically indicated     ENDO: History of hypothyroidism and prediabetes. TSH elevated earlier this hospitalization r/t malabsoption; reconsulted 3/4 due to elevated TSH.  Steroid induced hyperglycemia acceptable glycemic control on SSI. -->  - Maintain BG <180 with hypoglycemia protocol  - continue SSI  - Continue Synthroid to 250mcg daily, increased per endo rec.  Repeat TSH per endo   - Endocrine following     HEME: History of remote DVT; most recent DVT scan 3/3 with acute LIJ DVT and SVT in bilateral cephalic veins. Lower extremity negative. PF4 neg 2/21 (send out to [facility]). Acute on chronic  iron deficiency anemia. Acute blood loss anemia with repeated transfusions improved off systemic AC. Last transfusion 3/4. -->    - Follow up on aorta iliac, carotid, and abdominal ultrasounds  - CBC as clinically indicated  - stopping ferrous sulfate due to nausea. Venofer x 6 doses (3/5-3/10)  - SQH only per discussion with Dr. Bright today with new findings of DVT.  - Sodium bicarb purge for impella  - Continue ASA  - SCDs and for DVT prophylaxis  - Last type and screen: 3/5, resend in AM     Rejection/prophylaxis: S/p heart transplant 3/31/2022. Induction basiliximab. Donor HCV -, toxo -/-, CMV -/+. Mild ACR with +CD4 and negative HLAs however clinical presentation concern for AMR rejection.  PLEX/IVIG 2/17, 2/20. Thymo 2/18, 2/19. Repeat biopsy 2/20 with no evidence of on going rejection (ACR 0R, pAMR0 and no C3D or C4D). PLEX IVIG resumed with ongoing c/f rejection  - Continue prednisone 10mg daily  - resume tacrolimus 1mg BID with goal level 3-5  - Continue sirolimus 2.5 mg daily with goal level 7-9  - Continue Nystatin suspension 500,000 units q6hr  - Hold bactrim in setting of thrombocytopenia per Transplant  - continue valcyte 450mg q48hrs per transplant team for viremia  - PLEX (2 out of 5) PLEX & IVIG today. Next sessions: 3/8 Saturday, Monday 3/10, Wednesday 3/12     ID: Received Zosyn and Vancomycin on 2/15 in ED. Blood cultures from 2/15 negative. Mild leukocytosis and afebrile. -->  - Trend temp q4h     Skin: No active skin issues.   - Preventative Mepilex dressings in place on sacrum and heels  - Change preventative Mepilex weekly or more frequently as indicated (when moist/soiled)   - Every shift skin assessment per nursing and weekly ICU skin rounds  - Moisture barrier to be applied with christopher care  - Active skin problems addressed with nursing on daily rounds     Proph:  SCDs  SQH  Home PPI     G: Lines  RIJ Trialysis - 3/5  Singh - 3/4     Code status: Full Code      Restraints: none      I  personally spent 40 minutes of critical care time directly and personally managing the patient exclusive of separately billable procedures.     A,B,C,D,E,F,G: reviewed     Dispo: CTICU care for now. Possible transfer to HFICU this week.    CTICU TEAM PHONE [phone]

## 2024-03-08 NOTE — PROGRESS NOTES
CTICU Progress Note  Charla Bowles/[id number]    Admit Date: 2/15/2024  Hospital Length of Stay: 22   ICU Length of Stay: 17d 15h   CT SURGEON:     SUBJECTIVE:   Tolerated iHD  Slept well    MEDICATIONS  Infusions:  lactated Ringer's, Last Rate: 5 mL/hr (03/08/24 0700)  lactated Ringer's, Last Rate: Stopped (03/03/24 1200)  milrinone, Last Rate: 0.25 mcg/kg/min (03/08/24 0700)  sodium bicarbonate infusion Impella Purge 25 mEq/1000 mL D5W      Scheduled:  aspirin, 81 mg, Daily  calcitriol, 0.5 mcg, Daily  darbepoetin floyd, 100 mcg, q14 days  [Held by provider] ferrous sulfate (325 mg ferrous sulfate), 65 mg of iron, Daily with breakfast  heparin (porcine), 5,000 Units, q8h  hydrALAZINE, 100 mg, q8h  insulin lispro, 0-10 Units, Before meals & nightly  iron sucrose, 200 mg, Daily  isosorbide dinitrate, 40 mg, q8h  levothyroxine, 250 mcg, Daily  melatonin, 10 mg, Nightly  multivitamin with minerals, 1 tablet, Daily  nystatin, 5 mL, q6h  pantoprazole, 40 mg, Daily before breakfast  polyethylene glycol, 17 g, BID  predniSONE, 10 mg, Daily  rosuvastatin, 40 mg, Nightly  sennosides-docusate sodium, 2 tablet, BID  sirolimus, 2.5 mg, Daily  sodium chloride, 5 spray, 4x daily  [Held by provider] sulfamethoxazole-trimethoprim, 80 mg of trimethoprim, Daily  tacrolimus, 1 mg, q12h TRICIA  traZODone, 50 mg, Nightly  valGANciclovir, 450 mg, q48h      PRN:  acetaminophen, 975 mg, q8h PRN  bisacodyl, 10 mg, Daily PRN  calcium gluconate, 1 g, q6h PRN  calcium gluconate, 2 g, q6h PRN  dextrose, 25 g, q15 min PRN  dextrose, 25 g, q15 min PRN   Or  glucagon, 1 mg, q15 min PRN  glucagon, 1 mg, q15 min PRN  hydrALAZINE, 20 mg, q4h PRN  artificial tears, 2 drop, PRN  magnesium sulfate, 1 g, q6h PRN  magnesium sulfate, 2 g, q6h PRN  microfibrllar collagen, , PRN  mupirocin, , BID PRN  ondansetron, 4 mg, q4h PRN  oxyCODONE, 5 mg, q4h PRN  sodium chloride, 1 spray, 4x daily PRN        PHYSICAL EXAM:   Visit Vitals  /78   Pulse (!) 138  "  Temp 37.1 °C (98.8 °F) (Temporal)   Resp 24   Ht 1.549 m (5' 0.98\")   Wt 92.4 kg (203 lb 11.3 oz)   SpO2 98%   BMI 38.51 kg/m²   OB Status Hysterectomy   Smoking Status Never   BSA 1.99 m²     Wt Readings from Last 5 Encounters:   03/06/24 92.4 kg (203 lb 11.3 oz)   12/07/23 92.1 kg (203 lb)   12/01/23 93 kg (205 lb)   11/29/23 92.9 kg (204 lb 12.8 oz)   11/09/23 91.3 kg (201 lb 3.2 oz)     INTAKE/OUTPUT:  I/O last 3 completed shifts:  In: 5836.5 (63.2 mL/kg) [P.O.:500; I.V.:1036.5 (11.2 mL/kg); Other:4300]  Out: 8505 (92 mL/kg) [Urine:245 (0.1 mL/kg/hr); Other:8260]  Weight: 92.4 kg        Physical Exam:   - CONSTITUTION: Young appearing female with impella 5.5  - NEUROLOGIC: intact.  Alert, following all commands.  - CARDIOVASCULAR: R axillary impella, no bleeding at site.  P9, 5.3L flow. Sinus tachycardia.  - RESPIRATORY: clear on RA.  No secretions  - GI: soft, obese. Hyperactive BS  - : oliguric   - EXTREMITIES:  Palpable pulses throughout  - SKIN: intact  - PSYCHIATRIC: calm    Daily Risk Screen  Central line: RIJ trialysis, Maintain for inotropic support    Images: CXR w/ mild pulmonary congestion    Invasive Hemodynamics:    Most Recent Range Past 24hrs   BP (Art) 133/102 No data recorded   MAP(Art) 110 mmHg No data recorded   RA/CVP   No data recorded   PA 27/19 No data recorded   PA(mean) 22 mmHg No data recorded   CO 4.9 L/min No data recorded   CI 2.5 L/min/m2 No data recorded   Mixed Venous 63 % No data recorded   SVR  1217 (dyne*sec)/cm5 No data recorded         Assessment/Plan   33 year old female with past medical history of heart transplant in March 2022 with postoperative course complicated by upper extremity/internal jugular DVTs, and 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 She was admitted to HFICU on 2/15 with concern for cardiogenic shock secondary to allograft rejection and decompensated heart failure with multiorgan dysfunction including significant elevation of liver enzymes and " nonoliguric acute kidney injury. Endomyocardial biopsy on 2/16 revealed mild ACR with +CD4s and negative HLAs, however multisystem organ failure persisted with increased inotropic requirements. IABP placed on 2/18. Transferred to CTICU on 2/19 for [facility] ECMO cannulation with Dr. Marian for persistent multi-organ system dysfunction. Intubated 2/21 for respiratory failure 2/2 pulmonary edema, extubated 2/24. ECMO de-cannulated 2/29/24 but had worsening HIRAM and overall declined clinical status and IABP was transitioned to R axillary impella 5.5 3/1. Now tolerating iHD and having second course of Plex/IVIG.     Plan:  NEURO: No history. Neuro intact with minimal pain. Previous insomnia resolved. OOB to chair daily with PT and walking laps. -->  - Serial neuro and pain assessments  - PRN Tylenol 975 mg Q8 PRN  - Decrease oxy to 2.5mg Q4 PRN.   - PT/OT Consult  - Continue scheduled melatonin 10 mg nightly and Trazodone 50 mg PRN for insomnia  - CAM ICU score qshift. Sleep/wake cycle hygiene     ENT: Significant epistaxis 2/28 and 3/3 requiring ENT consult. Packing again removed by ENT 3/5. No current evidence of epistaxis.  - [place] spray 5x day x10 days from replete bleeding  - prn ocean spray and mupiricin for dry nasal passages     CV: Patient has a history of heart transplant in March of 2022 with TTE on 11/29 with LVEF 60-65%. Admitted for cardiogenic shock with concern for acute cellular rejection s/p IABP placement (R fem) 2/18 and VA ECMO (left fem) 2/19. S/p ECMO decannulation 2/29 with subsequent decline now s/p impella 5.5 and IABP removal 3/1. ECHO 3/1 with EF 30-35% with persisting RV dysfunction.  Currently on milrinone 0.25 mcg/kg/min with impella at P9/5.2L with stable end organ perfusion.  Tachycardia persisting, unchanged despite repeated changes in therapies.  -->  - Maintain goal MAP 70-90  - Continue full impella support P9  - continue milrinone 0.25  - PO hydralazine 100mg q8h and PRN hydral 20 mg IVP for  MAP >90  - Continue isosorbide to 40mg every 8 hours  - Continue home rosuvastatin 40mg daily  - Continue ASA 81 mg daily  - Hold home amlodipine  - Continuing advanced therapy work-up. CT chest completed- f/u final read. Vasc US aorta and carotids (limited by lines) completed. KATHIE pending. PFTs today.  Dental recommending panorex vs facial CT- waiting while Plex and other more time sensitive therapies being completed.     PULM: No history. Acute respiratory failure now resolved, intubated 2/21-2/24. Reintubated for OR 3/1 and extubated 3/2.  Remains on RA. -->  - CXR as needed  - Maintain SPO2 > 92%    GI: History of gastric bypass and MMF colitis.  Shock liver resolved. Constipation resolved, multiple Bms yesterday. Tolerating diet but reports food has poor taste. -->  - Continue home PPI, calcitriol 0.5mg daily, multivitamin, calcium carbonate 1,250mg BID  - Continue diet  - Continue miralax BID & senna-colace BID, ok to hold    : No history, baseline Cr 1.2-1.3.  HIRAM originally on CVVH then with renal recovery. Worsened HIRAM after ECMO decannulation now on iHD, last 3/7 with ~2.4L off.  Oliguric.  -->  - RFP as clinically indicated, replete electrolytes per CTICU protocol.  Holding potassium repletion  - dc wheatley and bladder scan daily  - f/u AM labs and indication for HD again today  - nephrology consult     ENDO: History of hypothyroidism and prediabetes. TSH elevated earlier this hospitalization r/t malabsoption; reconsulted 3/4 due to elevated TSH.  Steroid induced hyperglycemia acceptable glycemic control on SSI. -->  - Maintain BG <180 with hypoglycemia protocol  - continue SSI  - Continue Synthroid to 250mcg daily. Decrease Levothyroxine to 200 mcg orally daily on Monday 3/11/24 with repeat TSH on 3/18/24 per endocrine.   - Endocrine following     HEME: History of remote DVT; most recent DVT scan 3/3 with acute LIJ DVT and SVT in bilateral cephalic veins. Lower extremity negative. PF4 neg 2/21 (send out  to [facility]). Acute on chronic iron deficiency anemia. Acute blood loss anemia with repeated transfusions improved off systemic AC. Last transfusion 3/4. -->    - Follow up on aorta iliac, carotid, and abdominal ultrasounds  - CBC as clinically indicated  - stopping ferrous sulfate due to nausea. Venofer x 6 doses (3/5-3/10)  - SQH only per discussion with Dr. Bright 3/7 with new findings of DVT.  - Sodium bicarb purge for impella  - Continue ASA  - SCDs and for DVT prophylaxis  - Last type and screen: 3/8     Rejection/prophylaxis: S/p heart transplant 3/31/2022. Induction basiliximab. Donor HCV -, toxo -/-, CMV -/+. Mild ACR with +CD4 and negative HLAs however clinical presentation concern for AMR rejection.  PLEX/IVIG 2/17, 2/20. Thymo 2/18, 2/19. Repeat biopsy 2/20 with no evidence of on going rejection (ACR 0R, pAMR0 and no C3D or C4D). PLEX/IVIG resumed with ongoing c/f rejection- 3/6, 3/7.   - Continue prednisone 10mg daily  - continue tacrolimus 1mg BID with goal level 3-5  - Continue sirolimus 2.5 mg daily with goal level 7-9  - Continue Nystatin suspension 500,000 units q6hr  - Hold bactrim in setting of thrombocytopenia per Transplant  - continue valcyte 450mg q48hrs per transplant team for viremia.  F/u repeat CMV DNA PCR  - PLEX (3 out of 5) PLEX & IVIG today. Next sessions: Monday 3/10 and Wednesday 3/12     ID: Received Zosyn and Vancomycin on 2/15 in ED. Blood cultures from 2/15 negative. Mild leukocytosis and afebrile. -->  - Trend temp q4h     Skin: No active skin issues.   - Preventative Mepilex dressings in place on sacrum and heels  - Change preventative Mepilex weekly or more frequently as indicated (when moist/soiled)   - Every shift skin assessment per nursing and weekly ICU skin rounds  - Moisture barrier to be applied with christopher care  - Active skin problems addressed with nursing on daily rounds     Proph:  SCDs  SQH  Home PPI     G: Lines  RIJ Trialysis - 3/5  Singh - 3/4, dc     Code status:  Full Code      Restraints: none      I personally spent 40 minutes of critical care time directly and personally managing the patient exclusive of separately billable procedures.     A,B,C,D,E,F,G: reviewed     Dispo: CTICU care for now. Possible transfer to HFICU this week.    CTICU TEAM PHONE [phone]

## 2024-03-08 NOTE — POST-PROCEDURE NOTE
This is a 34 y.o. female with  antibody-mediated rejection of heart graft  who underwent therapeutic plasma exchange (TPE) with 100% plasma as replacement fluid.     I saw and evaluated the patient during the TPE.  The patient was resting in bed.  The vascular access functioned well.     Pre-procedure labs:  WBC   Date/Time Value Ref Range Status   03/08/2024 12:47 PM 9.8 4.4 - 11.3 x10*3/uL Final     Hemoglobin   Date/Time Value Ref Range Status   03/08/2024 12:47 PM 7.1 (L) 12.0 - 16.0 g/dL Final     Hematocrit   Date/Time Value Ref Range Status   03/08/2024 12:47 PM 21.7 (L) 36.0 - 46.0 % Final     Platelets   Date/Time Value Ref Range Status   03/08/2024 12:47  (L) 150 - 450 x10*3/uL Final        POCT Calcium, Ionized   Date/Time Value Ref Range Status   03/08/2024 12:47 PM 1.20 1.1 - 1.33 mmol/L Final     Comment:     The performance characteristics of ionized calcium tested  in heparinized plasma or serum have been validated by the  individual  laboratory site where testing is performed.   Testing on heparinized plasma or serum is not approved by   the FDA; however, such approval is not necessary.        Protime   Date/Time Value Ref Range Status   03/08/2024 12:47 PM 14.3 (H) 9.8 - 12.8 seconds Final     INR   Date/Time Value Ref Range Status   03/08/2024 12:47 PM 1.3 (H) 0.9 - 1.1 Final     aPTT   Date/Time Value Ref Range Status   03/08/2024 12:47 PM 39 (H) 27 - 38 seconds Final     Fibrinogen   Date/Time Value Ref Range Status   03/08/2024 05:49  200 - 400 mg/dL Final        Pre-procedure vital signs at 09:50:  T: 36.9 C, HR: 137, RR: 28, /80  Pulse oximetry: 99% on room air    Post-procedure vital signs at 12:15:  T: 36.7 C, HR: 141, RR: 24, BP: 104/85  Pulse oximetry: 98% on room air       Outcome: TPE completed.   Adverse Reaction: No    Assessment and Plan:  34 y.o. female with antibody-mediated rejection of heart graft who underwent TPE #3/5.  Draw post-procedure labs  Vascular  access to be managed by clinical team.  Next TPE #4 is schedule for 3/11/24.

## 2024-03-08 NOTE — PROGRESS NOTES
"Charla Bwoles is a 34 y.o. female on day 22 of admission presenting with Cardiogenic shock (CMS/HCC).    Subjective:  No acute events, tolerated HD well yesterday with 2L UF. Only 200 ml of urine in the last 24 hours. No complaints.     Last Recorded Vitals  Blood pressure 93/61, pulse (!) 137, temperature 36.9 °C (98.4 °F), resp. rate (!) 28, height 1.549 m (5' 0.98\"), weight 92.4 kg (203 lb 11.3 oz), SpO2 99 %.  Intake/Output last 3 Shifts:  I/O last 3 completed shifts:  In: 5836.5 (63.2 mL/kg) [P.O.:500; I.V.:1036.5 (11.2 mL/kg); Other:4300]  Out: 8505 (92 mL/kg) [Urine:245 (0.1 mL/kg/hr); Other:8260]  Weight: 92.4 kg     General: No distress   CVS: S1 S2 no murmurs  RESP:  Lungs CTAB on RA  ABDO: Soft, non-tender   Neuro: A + O x 3  Skin: No rash   Extremities: No edema   Impella in place, getting PLEX  Right Int jug trialysis     Labs:  Results for orders placed or performed during the hospital encounter of 02/15/24 (from the past 24 hour(s))   CBC   Result Value Ref Range    WBC 10.8 4.4 - 11.3 x10*3/uL    nRBC 0.4 (H) 0.0 - 0.0 /100 WBCs    RBC 2.59 (L) 4.00 - 5.20 x10*6/uL    Hemoglobin 7.7 (L) 12.0 - 16.0 g/dL    Hematocrit 23.1 (L) 36.0 - 46.0 %    MCV 89 80 - 100 fL    MCH 29.7 26.0 - 34.0 pg    MCHC 33.3 32.0 - 36.0 g/dL    RDW 14.9 (H) 11.5 - 14.5 %    Platelets 180 150 - 450 x10*3/uL   Calcium, Ionized   Result Value Ref Range    POCT Calcium, Ionized 1.09 (L) 1.1 - 1.33 mmol/L   B-Type Natriuretic Peptide   Result Value Ref Range    BNP 4,683 (H) 0 - 99 pg/mL   Cytomegalovirus IgG   Result Value Ref Range    Cytomegalovirus IgG Reactive (A) Nonreactive   Cresencio-Barr Virus Antibody Panel   Result Value Ref Range    EBV VCA, IgG  Positive (A) Negative    EBV VCA, IgM  Negative Negative    EBV Early Antigen Antibody, IgG Equivocal (A) Negative    EBV Nuclear Antigen Antibody, IgG Positive (A) Negative   Hepatitis A Antibody, Total   Result Value Ref Range    Hepatitis A  AB-Total Reactive (A) " Nonreactive   Hepatitis B Core Antibody, Total   Result Value Ref Range    Hepatitis B Core AB- Total Nonreactive Nonreactive   Hepatitis B Surface Antibody   Result Value Ref Range    Hepatitis B Surface .1 (H) <10.0 mIU/mL   Hepatitis B Surface Antigen   Result Value Ref Range    Hepatitis B Surface AG Nonreactive Nonreactive   Hepatitis C Antibody   Result Value Ref Range    Hepatitis C AB Nonreactive Nonreactive   Hemoglobin A1C   Result Value Ref Range    Hemoglobin A1C 5.7 (H) see below %    Estimated Average Glucose 117 Not Established mg/dL   HIV 1/2 Antigen/Antibody Screen with Reflex to Confirmation   Result Value Ref Range    HIV 1/2 Antigen/Antibody Screen with Reflex to Confirmation Nonreactive Nonreactive   HSV1 IgG and HSV2 IgG   Result Value Ref Range    HSV 1, IgG 6.9 (H) <0.9 INDEX    HSV 2, IgG 1.8 (H) <0.9 INDEX   Human Chorionic Gonadotropin, Serum Quantitative   Result Value Ref Range    HCG, Beta-Quantitative <3 <5 mIU/mL   IgM   Result Value Ref Range    IgM 65 40 - 230 mg/dL   IgA   Result Value Ref Range    IgA 134 70 - 400 mg/dL   IgG Subclasses (1, 2, 3, and 4)   Result Value Ref Range    IgG 1 604 490 - 1,140 mg/dL    IgG 2 262 150 - 640 mg/dL    IgG 3 25 11 - 85 mg/dL    IgG 4 59 3 - 200 mg/dL    IgG 777 700 - 1,600 mg/dL   Mumps Antibody, IgG   Result Value Ref Range    Mumps, IgG Positive (A) Negative    Mumps, IgG Index 2.9 (H) <=0.8 IA   Prealbumin   Result Value Ref Range    Prealbumin 19.1 18.0 - 40.0 mg/dL   Prostate Specific Antigen, Screen   Result Value Ref Range    Prostate Specific Antigen,Screen 0.37 (H) <=0.10 ng/mL   Rubella Antibody, IgG   Result Value Ref Range    Rubella, IgG Positive Negative    Rubella, IgG Index 5.1 <=0.7 IA IA   Rubeola Antibody, IgG   Result Value Ref Range    Rubeola, IgG Positive     Rubeola, IgG Index 5.6 (H) <=0.8 IA   Syphilis Screen with Reflex   Result Value Ref Range    Syphilis Total Ab Nonreactive Nonreactive   Triiodothyronine,  Total   Result Value Ref Range    Triiodothyronine 60 60 - 200 ng/dL   Thyroxine, Total   Result Value Ref Range    Thyroxine 8.7 4.5 - 11.1 ug/dL   Toxoplasma IgG   Result Value Ref Range    Toxoplasma IgG Nonreactive Nonreactive   Varicella Zoster Antibody, IgG   Result Value Ref Range    Varicella Zoster, IgG Positive (A) Negative    Varicella Zoster, IgG Index 6.0 (H) <=0.8 IA   T4, free   Result Value Ref Range    Thyroxine, Free 1.37 0.78 - 1.48 ng/dL   TSH   Result Value Ref Range    Thyroid Stimulating Hormone 14.87 (H) 0.44 - 3.98 mIU/L   Magnesium   Result Value Ref Range    Magnesium 2.26 1.60 - 2.40 mg/dL   Coagulation Screen   Result Value Ref Range    Protime 14.1 (H) 9.8 - 12.8 seconds    INR 1.3 (H) 0.9 - 1.1    aPTT 32 27 - 38 seconds   Renal function panel   Result Value Ref Range    Glucose 174 (H) 74 - 99 mg/dL    Sodium 133 (L) 136 - 145 mmol/L    Potassium 4.4 3.5 - 5.3 mmol/L    Chloride 91 (L) 98 - 107 mmol/L    Bicarbonate 27 21 - 32 mmol/L    Anion Gap 19 10 - 20 mmol/L    Urea Nitrogen 79 (H) 6 - 23 mg/dL    Creatinine 5.17 (H) 0.50 - 1.05 mg/dL    eGFR 11 (L) >60 mL/min/1.73m*2    Calcium 10.1 8.6 - 10.6 mg/dL    Phosphorus 5.9 (H) 2.5 - 4.9 mg/dL    Albumin 3.3 (L) 3.4 - 5.0 g/dL   Light Blue Top   Result Value Ref Range    Extra Tube Hold for add-ons.    Light Blue Top   Result Value Ref Range    Extra Tube Hold for add-ons.    POCT GLUCOSE   Result Value Ref Range    POCT Glucose 107 (H) 74 - 99 mg/dL   Tacrolimus level   Result Value Ref Range    Tacrolimus  2.3 <=15.0 ng/mL   Lactate Dehydrogenase   Result Value Ref Range     (H) 84 - 246 U/L   Hepatic function panel   Result Value Ref Range    Albumin 3.6 3.4 - 5.0 g/dL    Bilirubin, Total 0.8 0.0 - 1.2 mg/dL    Bilirubin, Direct 0.3 0.0 - 0.3 mg/dL    Alkaline Phosphatase 47 33 - 110 U/L    ALT 9 7 - 45 U/L    AST 38 9 - 39 U/L    Total Protein 5.7 (L) 6.4 - 8.2 g/dL   Type And Screen   Result Value Ref Range    ABO TYPE O   "   Rh TYPE POS     ANTIBODY SCREEN NEG    CBC   Result Value Ref Range    WBC 9.3 4.4 - 11.3 x10*3/uL    nRBC 2.2 (H) 0.0 - 0.0 /100 WBCs    RBC 2.43 (L) 4.00 - 5.20 x10*6/uL    Hemoglobin 7.1 (L) 12.0 - 16.0 g/dL    Hematocrit 20.7 (L) 36.0 - 46.0 %    MCV 85 80 - 100 fL    MCH 29.2 26.0 - 34.0 pg    MCHC 34.3 32.0 - 36.0 g/dL    RDW 14.8 (H) 11.5 - 14.5 %    Platelets 147 (L) 150 - 450 x10*3/uL   Fibrinogen   Result Value Ref Range    Fibrinogen 234 200 - 400 mg/dL   Coagulation Screen   Result Value Ref Range    Protime 14.1 (H) 9.8 - 12.8 seconds    INR 1.3 (H) 0.9 - 1.1    aPTT 26 (L) 27 - 38 seconds   Calcium, Ionized   Result Value Ref Range    POCT Calcium, Ionized 1.20 1.1 - 1.33 mmol/L   POCT GLUCOSE   Result Value Ref Range    POCT Glucose 163 (H) 74 - 99 mg/dL     *Note: Due to a large number of results and/or encounters for the requested time period, some results have not been displayed. A complete set of results can be found in Results Review.         Assessment/Plan     Charla Bowles is a 33 y.o. 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       HIRAM on CKD stage 3: (baseline Cr 1.2), oliguric   -HIRAM in the setting of CRS, cardiogenic shock.  Started on CRRT on 2/19/2024 for volume management. Discontinued 2/27.  Clearance remains impaired. Last iHD 3/7   - oliguric despite trial of IV diuretics  - volume status: euvolemic   - has right int jug trialysis    Plan:  - Plan for iHD tomorrow, for now will continue on TTS schedule.   - Monitor daily RFP  - Will evaluate labs and volume status daily for HD indications.   - Continue strict Is/Os    # anemia:   -  continue  venofer 200 mg IV daily for 5 days  - " benadryl or zofran 30 mins before  venofer.   - continue aranesp  q2 weeks, last day of 3/6    #BMD:   - calcium WNL and phos elevated      # Immunosuppression:   As per the heart transplant team     # electrolytes  - hyponatremia secondary to impaired free water clearance from kidney injury, also getting IVIG     Cardiogenic shock, off pressors  -S/p endomyocardial biopsies on 2/16 and 2/20 without definitive findings but pt treated for presumed rejection.  DSA negative so far. S/p pulse methylprednisolone 1 g for 3 days from 2/16- 2/18, Thymoglobulin -2 doses given on 2/18 and 2/19, IV immunoglobulin plus PLEX sessions done on 2/18 and 2/20.  - planned for endometrial biopsy 3/6  -s/p [facility] ECMO and on IABP support till 3/1/24. Currently with Impella 3/1/24.  - planned for IVIG and PLEX, next PLEX (4/5) 3/11    Kuldip Hernandes MD  Nephrology fellow

## 2024-03-08 NOTE — NURSING NOTE
Apheresis Nursing Note    Charla Bowles is a 34 y.o. female presenting with [facility]- cardiac for TPE series.    Pre-Procedure Assessment  Pre-Procedure Assessment  Level of Consciousness: Alert  Orientation Level: Oriented X4  Cognition: Appropriate judgement, Appropriate safety awareness  Pain Score: 0 - No pain  Access Sites 'Okay to Use' Obtained: Yes  Access Site(s) Assessment: Yes  Estimated Replacement Volume: 3050  Addtional Comments/Assessment Details: Rinseback.  Vital Signs  Temp: 36.9  Heart Rate: 137  Resp: 28  BP: 113/80  SpO2: 99 %  Pulse Oximetry Type: Continuous  Medical Gas Therapy: None (Room air)  Procedure Information and Time Out  Procedure Type: Therapeutic plasma exchange  Plasma Diagnosis: Antibody-mediated rejection, cardiac  TPE Procedure Number: 3  Volume Processed (L): 1.0  Fluid Balance: 95%  Albumin : Plasma Ratio: 0:100  TPE Time-Out Completed: Yes  Provider Time Out Completed with: Dr Sanchez  Time Out Completed on: 03/08/24  Time Out Completed at: 0955  Equipment and Disposables  Apheresis Machine: Spectra Optia [id number]  Apheresis Machine PM in Date: Yes  Blood Warmer: Astotherm DNA [id number]  Blood Warmer PM in Date: Yes  Kit and Blood/Fluid Warmer Inspection: Tubing inspected with machine prime, Tubing inspected prior to connection to patient  Kit Type: Exchange kits  Kit Lot Number: [id number]  Kit Expiration Date: 11/01/25  ADC-A Used as Anticoagulant: Yes  ACD-A Lot #: [id number]  ACD-A Expiration Date: 10/01/25  9% Sodium Chloride Lot # (Liter): [id number]  9% Sodium Chloride Expiration Date (Liter): 05/31/25  Sodium Chloride Volume: 100 mL  9% Sodium Chloride Lot #: [id number]  9% Sodium Chloride Expiration Date: 02/28/25  Procedure Start  Start Time: 1000    Post-Procedure Assessment:  Post-Procedure  End Time: 1215  Waste Materials Collected for Research: No  Procedure Outcome: Treatment completed successfully  Adverse Event: No  Post-Procedure Line Assessment:  Patent  Post-Procedure Access Care : Loaded/flushed per order, Caps changed, 'Do Not Flush' label applied  Vital Signs  Temp: 36.7 °C (98.1 °F)  Heart Rate: (!) 141  Resp: 24  BP: 104/85  SpO2: 98 %  Pulse Oximetry Type: Continuous  Medical Gas Therapy: None (Room air)  Post-Procedure Patient Fluid Values   Replacement Fluid Intake (mL): 3050 mL  AC Infused (mL): 60 mL  Rinseback Intake (mL): 156 mL  Ca+ Solution Intake (mL): 285 mL  Other Intake (mL): 0 mL  Apheresis Intake Total (mL): 3551 mL  Removed During Apheresis Output (mL): 3971 mL  AC in Bag Output (mL): 487 mL  Samples Output (mL): 15 mL  Apheresis Output Total (mL): 3499 mL  Inlet Volume Processed (mL): 5467 mL  Net Difference (mL): 52 mL  Disposition  Patient's Condition at End of Procedure: Stable  Disposition: N/A - procedure performed at bedside  Transferred via:  (N/A PLEX at bedside)  Report Given to: Floor Nurse  $ Apheresis Charge: Therapeutic plasma exchange  Raman Martin RN

## 2024-03-08 NOTE — PROGRESS NOTES
CTICU Attending Assessment Note    This is a 33 yoF who is ~1 year post-heart transplant presenting with acute rejection in cardiogenic shock who requiring MCS with ECMO but now decannulated continuing to require support with Impella.    Neuro: Awake, alert, oriented x3. CAM negative. Continue PO/topical multimodal pain therapy to adequately control pain. Continue CAM assessment and encourage restful sleep per ICU protocols.  Continue to promote mobility. Continue trazadone and melatonin for sleep promotion.  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 Will continue IVIG and Plasma exchange to complete course for concern for rejection. Goal MAP 70-90 and CI >2.2. Persistent tachycardia of unclear etiology without signs of malperfusion, may be in part due to milrinone but continues to require inotropy. EKG with sinus tachycardia without arrhythmia. Continue afterload reduction with hydralazine and isordil with goal MAP 70-90. Appreciate HF recommendations who are undertaking evaluation for re-transplantation. Continue ASA and statin.  Pulm: Currently on RA. Continue aggressive pulmonary toilet with clear CXR.  Renal: Acute renal failure with oliguria now on iHD and will continue to aim net negative fluid balance. Strict I&O with goal UOP >0.5 mL/kg/hr.  Electrolyte protocol.  GI: Continue tolerating diet and will change to renal diet today. Continue bowel regimen.  Heme: Acute postoperative blood loss anemia with goal Hgb >7 and platelets >50. No ongoing signs of bleeding today given resolution of epistaxis and will continue to follow CBC. Continue ongoing discussions to potentially restart heparin gtts but CT surgery requesting not to restart at this time given recent bleeding episodes (epistaxis)  Endo: Will continue SSI protocol with goal -180 per post-cardiotomy goal. Continue levothyroxine given hx of hypothyroidism.  ID: No other indications  for antibiotics at this time. Continue tacrolimus, valacyclovir, prednisone, sirolimus and appreciate HF recommendations regarding immunosuppression. Continue for IVIG and plasma exchange today per HF and transfusion medicine.    Skin/MSK: Surgical site is C/D/I.  Continue to promote mobility with frequent walking.  Prophy: Continue SCD, SQH, PPI.  A-F Bundle reviewed and addressed as above with multidisciplinary rounds completed at bedside.  Dispo: Continue CTICU care but will consider transfer to HF ICU.    Due to the high probability of life threatening clinical decompensation, the patient required critical care time evaluating and managing this patient.  Critical care time included obtaining a history, examining the patient, ordering and reviewing studies, discussing, developing, and implementing a management plan, evaluating the patient's response to treatment, and discussion with other care team providers. I saw and evaluated the patient myself. I reviewed the provider's documentation and discussed the patient with the provider. Critical care time was performed exclusive of billable procedures.    Critical Care Time: 33 minutes    Feliciano Lee MD  Emergency Critical Care Attending Physician

## 2024-03-08 NOTE — PROGRESS NOTES
Physical Therapy    Physical Therapy Treatment    Patient Name: Charla Bowles  MRN: [id number]  Today's Date: 3/8/2024  Time Calculation  Start Time: 1427  Stop Time: 1456  Time Calculation (min): 29 min       Assessment/Plan   PT Assessment  End of Session Communication: Bedside nurse  End of Session Patient Position: Alarm off, not on at start of session, Up in chair, Alarm off, caregiver present (All lines intact)  PT Plan  Inpatient/Swing Bed or Outpatient: Inpatient  PT Plan  Treatment/Interventions: Bed mobility, Transfer training, Gait training, Stair training, Balance training, Strengthening, Endurance training, Therapeutic exercise, Therapeutic activity  PT Plan: Skilled PT  PT Frequency: 5 times per week  PT Discharge Recommendations: High intensity level of continued care  PT Recommended Transfer Status: Assist x1, Assistive device  PT - OK to Discharge: Yes      General Visit Information:   PT  Visit  PT Received On: 03/08/24  Prior to Session Communication: Bedside nurse  Patient Position Received: Bed, 3 rail up, Alarm off, not on at start of session  Family/Caregiver Present: Yes  Caregiver Feedback: aunt present and supportive  General Comment: Patient seated EOB upon PT arrival. Agreeable to session. (Pt on .25 milrinone, R axillary impella (P-9), flow 5.Examined pre and post mobility, stable and with unchanged light bleeding around site.)     Subjective   Precautions:  Precautions  Medical Precautions: Fall precautions, Cardiac precautions  Precautions Comment: MAP 70-90, protective precautions, R axillary impella precautions (no pushing/pulling, no shoulder flexion or abduction >90 degrees)  Vital Signs:  Vital Signs  Heart Rate:  (pre 140 post 145)  Heart Rate Source: Monitor  Resp:  (pre 20 post 24)  SpO2:  (pre 100 post 100)  BP:  (pre 105.83 (91) post 114/91 (101))  Patient Position: Sitting    Objective   Pain:  Pain Assessment  Pain Assessment: 0-10  Pain Score: 0 - No  pain  Cognition:  Cognition  Overall Cognitive Status: Within Functional Limits  Orientation Level: Oriented X4  Lines/Tubes/Drains:  CVC 03/05/24 Triple lumen Right Internal jugular (Active)   Number of days: 3       Introducer 02/16/24 Internal jugular Right (Active)   Number of days: 21       Ventricular Assist Device Impella 5.5 (Active)   Number of days: 6     Continuous Medications/Drips:  lactated Ringer's, 5 mL/hr, Last Rate: 5 mL/hr (03/08/24 0900)  lactated Ringer's, 10 mL/hr, Last Rate: Stopped (03/03/24 1200)  milrinone, 0.25 mcg/kg/min (Dosing Weight), Last Rate: 0.25 mcg/kg/min (03/08/24 1121)  sodium bicarbonate infusion Impella Purge 25 mEq/1000 mL D5W, 10 mL/hr      PT Treatments:  Therapeutic Exercise  Therapeutic Exercise Performed: Yes  Therapeutic Exercise Activity 1: 2x10 FWd taps onto 2 inch aerobic step with LUE support on therapist arm. Min assist provided for steadying, task done for strengthening/endurance  Therapeutic Activity  Therapeutic Activity Performed: Yes  Therapeutic Activity 1: sit to/from stand: CGA to FWW  Therapeutic Activity 2: transfer EOB > recliner: CGA with FWW        Ambulation/Gait Training  Ambulation/Gait Training Performed: Yes  Ambulation/Gait Training 1  Surface 1: Level tile  Device 1: Rolling walker  Assistance 1: Minimum assistance  Quality of Gait 1: Forward flexed posture, Decreased step length (decreased cadenece)  Comments/Distance (ft) 1: 300 (2 self selected standing rest breaks, vitals remaining stable throughout.)       Outcome Measures:  [facility] Basic Mobility  Turning from your back to your side while in a flat bed without using bedrails: A little  Moving from lying on your back to sitting on the side of a flat bed without using bedrails: A little  Moving to and from bed to chair (including a wheelchair): A little  Standing up from a chair using your arms (e.g. wheelchair or bedside chair): A little  To walk in hospital room: A little  Climbing 3-5  steps with railing: A little  Basic Mobility - Total Score: 18           FSS-ICU  Ambulation: Walks >/ or equal to 150 feet with minimal assistance x1  Rolling: Moderate assistance (performs 50 - 74% of task)  Sitting: Supervision or set-up only  Transfer Sit-to-Stand: Minimal assistance (performs 75% or more of task)  Transfer Supine-to-Sit: Moderate assistance (performs 50 - 74% of task)  Total Score: 19         Education Documentation  Precautions, taught by Beulah Childress PT at 3/8/2024  3:11 PM.  Learner: Patient  Readiness: Acceptance  Method: Explanation  Response: Verbalizes Understanding    Mobility Training, taught by Beulah Childress PT at 3/8/2024  3:11 PM.  Learner: Patient  Readiness: Acceptance  Method: Explanation  Response: Verbalizes Understanding    Education Comments  No comments found.          OP EDUCATION:       Encounter Problems       Encounter Problems (Active)       Balance       Pt will demonstrate improved sitting/standing static/dynamic balance activities without LOB for increase in safety prior to DC.  (Progressing)       Start:  03/01/24    Expected End:  03/15/24               Mobility       Pt will ambulate 200ft with appropriate form, CGA or less, LRAD, and no LOB for safe DC.  (Progressing)       Start:  03/01/24    Expected End:  03/15/24            Pt will tolerate >15 minutes of upright standing activity without seated rest breaks with no changes in vital signs for improved functional mobility.  (Progressing)       Start:  03/01/24    Expected End:  03/15/24            Pt will ascend/descend 3 steps with HR with CGA or less in order to safely access her home upon DC.  (Progressing)       Start:  03/01/24    Expected End:  03/15/24               Transfers       Pt will perform bed mobility and sit<>stand transfers with CGA or less and use of LRAD to safety DC.  (Progressing)       Start:  03/01/24    Expected End:  03/15/24                       03/08/24 at 3:13 PM   BEULAH WILL  MORENO, PT   Rehab Office: [phone]

## 2024-03-08 NOTE — PROGRESS NOTES
"[facility]  ADVANCED HEART FAILURE AND TRANSPLANT CARDIOLOGY     Charla Bowles is a 33 y.o. female on day 17 of admission presenting with Cardiogenic shock (CMS/HCC).    INTERVAL EVENTS / PERTINENT ROS:    Patient remains on P9 support. Planned for additional PLEX/IVIG today. No major overnight events.    Objective     Physical Exam  Constitutional:       General: She is not in acute distress.  HENT:      Head: Normocephalic.      Mouth/Throat:      Mouth: Mucous membranes are moist.   Eyes:      Pupils: Pupils are equal, round, and reactive to light.   Cardiovascular:      Rate and Rhythm: Regular rhythm. Tachycardia present.      Pulses: Normal pulses.      Heart sounds: Murmur heard.      No friction rub. No gallop.      Comments: R axillary 5.5 impella.   Pulmonary:      Effort: Pulmonary effort is normal. No accessory muscle usage or retractions.      Breath sounds: No wheezing, rhonchi or rales.   Abdominal:      General: Abdomen is flat. Bowel sounds are normal. There is no distension.      Palpations: Abdomen is soft. There is no mass.      Tenderness: There is no abdominal tenderness. There is no guarding.      Hernia: No hernia is present.   Musculoskeletal:      Cervical back: Full passive range of motion without pain.   Skin:     General: Skin is warm and dry.      Capillary Refill: Capillary refill takes less than 2 seconds.      Coloration: Skin is not jaundiced.      Findings: No laceration, rash or wound.   Neurological:      General: No focal deficit present.      Mental Status: She is alert.       Last Recorded Vitals  Blood pressure 104/85, pulse (!) 141, temperature 36.7 °C (98.1 °F), resp. rate 24, height 1.549 m (5' 0.98\"), weight 92.4 kg (203 lb 11.3 oz), SpO2 98 %.  Intake/Output last 3 Shifts:  I/O last 3 completed shifts:  In: 5836.5 (63.2 mL/kg) [P.O.:500; I.V.:1036.5 (11.2 mL/kg); Other:4300]  Out: 8505 (92 mL/kg) [Urine:245 (0.1 mL/kg/hr); " Other:8260]  Weight: 92.4 kg     Relevant Results  Scheduled medications  aspirin, 81 mg, oral, Daily  calcitriol, 0.5 mcg, oral, Daily  darbepoetin floyd, 100 mcg, subcutaneous, q14 days  [Held by provider] ferrous sulfate (325 mg ferrous sulfate), 65 mg of iron, oral, Daily with breakfast  heparin (porcine), 5,000 Units, subcutaneous, q8h  hydrALAZINE, 100 mg, oral, q8h  insulin lispro, 0-10 Units, subcutaneous, Before meals & nightly  iron sucrose, 200 mg, intravenous, Daily  isosorbide dinitrate, 40 mg, oral, q8h  levothyroxine, 250 mcg, oral, Daily  melatonin, 10 mg, oral, Nightly  multivitamin with minerals, 1 tablet, oral, Daily  nystatin, 5 mL, Swish & Swallow, q6h  pantoprazole, 40 mg, oral, Daily before breakfast  polyethylene glycol, 17 g, oral, BID  predniSONE, 10 mg, oral, Daily  rosuvastatin, 40 mg, oral, Nightly  sennosides-docusate sodium, 2 tablet, oral, BID  sirolimus, 2.5 mg, oral, Daily  sodium chloride, 5 spray, Each Nostril, 4x daily  [Held by provider] sulfamethoxazole-trimethoprim, 80 mg of trimethoprim, oral, Daily  tacrolimus, 1 mg, oral, q12h TRICIA  traZODone, 50 mg, oral, Nightly  valGANciclovir, 450 mg, oral, q48h      Continuous medications  lactated Ringer's, 5 mL/hr, Last Rate: 5 mL/hr (03/08/24 0900)  lactated Ringer's, 10 mL/hr, Last Rate: Stopped (03/03/24 1200)  milrinone, 0.25 mcg/kg/min (Dosing Weight), Last Rate: 0.25 mcg/kg/min (03/08/24 1121)  sodium bicarbonate infusion Impella Purge 25 mEq/1000 mL D5W, 10 mL/hr      PRN medications  PRN medications: acetaminophen, bisacodyl, calcium gluconate, calcium gluconate, dextrose, dextrose **OR** glucagon, glucagon, hydrALAZINE, artificial tears, magnesium sulfate, magnesium sulfate, microfibrllar collagen, mupirocin, ondansetron, oxyCODONE, sodium chloride    Results for orders placed or performed during the hospital encounter of 02/15/24 (from the past 24 hour(s))   POCT GLUCOSE   Result Value Ref Range    POCT Glucose 107 (H) 74 -  99 mg/dL   Tacrolimus level   Result Value Ref Range    Tacrolimus  2.3 <=15.0 ng/mL   Sirolimus level   Result Value Ref Range    Sirolimus  8.5 4.0 - 20.0 ng/mL   Lactate Dehydrogenase   Result Value Ref Range     (H) 84 - 246 U/L   Hepatic function panel   Result Value Ref Range    Albumin 3.6 3.4 - 5.0 g/dL    Bilirubin, Total 0.8 0.0 - 1.2 mg/dL    Bilirubin, Direct 0.3 0.0 - 0.3 mg/dL    Alkaline Phosphatase 47 33 - 110 U/L    ALT 9 7 - 45 U/L    AST 38 9 - 39 U/L    Total Protein 5.7 (L) 6.4 - 8.2 g/dL   Type And Screen   Result Value Ref Range    ABO TYPE O     Rh TYPE POS     ANTIBODY SCREEN NEG    CMV DNA, quantitative, PCR   Result Value Ref Range    Cytomegalovirus DNA, PCR Log IU/ML      CMV DNA Result Not Detected Not Detected   CBC   Result Value Ref Range    WBC 9.3 4.4 - 11.3 x10*3/uL    nRBC 2.2 (H) 0.0 - 0.0 /100 WBCs    RBC 2.43 (L) 4.00 - 5.20 x10*6/uL    Hemoglobin 7.1 (L) 12.0 - 16.0 g/dL    Hematocrit 20.7 (L) 36.0 - 46.0 %    MCV 85 80 - 100 fL    MCH 29.2 26.0 - 34.0 pg    MCHC 34.3 32.0 - 36.0 g/dL    RDW 14.8 (H) 11.5 - 14.5 %    Platelets 147 (L) 150 - 450 x10*3/uL   Fibrinogen   Result Value Ref Range    Fibrinogen 234 200 - 400 mg/dL   Coagulation Screen   Result Value Ref Range    Protime 14.1 (H) 9.8 - 12.8 seconds    INR 1.3 (H) 0.9 - 1.1    aPTT 26 (L) 27 - 38 seconds   Calcium, Ionized   Result Value Ref Range    POCT Calcium, Ionized 1.20 1.1 - 1.33 mmol/L   POCT GLUCOSE   Result Value Ref Range    POCT Glucose 163 (H) 74 - 99 mg/dL   Calcium, Ionized   Result Value Ref Range    POCT Calcium, Ionized 1.11 1.1 - 1.33 mmol/L   CBC   Result Value Ref Range    WBC 9.8 4.4 - 11.3 x10*3/uL    nRBC 1.5 (H) 0.0 - 0.0 /100 WBCs    RBC 2.45 (L) 4.00 - 5.20 x10*6/uL    Hemoglobin 7.3 (L) 12.0 - 16.0 g/dL    Hematocrit 22.1 (L) 36.0 - 46.0 %    MCV 90 80 - 100 fL    MCH 29.8 26.0 - 34.0 pg    MCHC 33.0 32.0 - 36.0 g/dL    RDW 15.3 (H) 11.5 - 14.5 %    Platelets 146 (L) 150 - 450  x10*3/uL   Coagulation Screen   Result Value Ref Range    Protime 14.3 (H) 9.8 - 12.8 seconds    INR 1.3 (H) 0.9 - 1.1    aPTT 39 (H) 27 - 38 seconds   Magnesium   Result Value Ref Range    Magnesium 1.97 1.60 - 2.40 mg/dL   Renal function panel   Result Value Ref Range    Glucose 204 (H) 74 - 99 mg/dL    Sodium 136 136 - 145 mmol/L    Potassium 4.0 3.5 - 5.3 mmol/L    Chloride 93 (L) 98 - 107 mmol/L    Bicarbonate 29 21 - 32 mmol/L    Anion Gap 18 10 - 20 mmol/L    Urea Nitrogen 40 (H) 6 - 23 mg/dL    Creatinine 3.55 (H) 0.50 - 1.05 mg/dL    eGFR 17 (L) >60 mL/min/1.73m*2    Calcium 10.2 8.6 - 10.6 mg/dL    Phosphorus 4.8 2.5 - 4.9 mg/dL    Albumin 3.3 (L) 3.4 - 5.0 g/dL   Calcium, Ionized   Result Value Ref Range    POCT Calcium, Ionized 1.20 1.1 - 1.33 mmol/L   CBC   Result Value Ref Range    WBC 9.8 4.4 - 11.3 x10*3/uL    nRBC 1.7 (H) 0.0 - 0.0 /100 WBCs    RBC 2.39 (L) 4.00 - 5.20 x10*6/uL    Hemoglobin 7.1 (L) 12.0 - 16.0 g/dL    Hematocrit 21.7 (L) 36.0 - 46.0 %    MCV 91 80 - 100 fL    MCH 29.7 26.0 - 34.0 pg    MCHC 32.7 32.0 - 36.0 g/dL    RDW 15.0 (H) 11.5 - 14.5 %    Platelets 148 (L) 150 - 450 x10*3/uL     *Note: Due to a large number of results and/or encounters for the requested time period, some results have not been displayed. A complete set of results can be found in Results Review.         Assessment/Plan   Assessment: Ms. 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    Originally presented to [facility] ED on 2/15/24 with complaints of N/V x 3 days and inability to keep medications down. POCUS revealed acute systolic heart failure w/ severe BIV dysfunction when compared to  previous images in 11/2023. Also noted to have HIRAM requiring CVVH and transaminitis. Immunosuppression notably below therapeutic range. Admitted to HFICU and treated for suspected acute cellular vs. acute antibody mediated rejection; however, cardiac biopsy negative for signs of rejection. Despite rejection therapy, inotropes and MCS via IABP (2/18/24), the patient's end organ function continued to worsen without improvement in cardiac function. Ultimately placed on left femoral VA ECMO w/ Dr. Marina on 2/19/2024 and transferred to CTICU.     CTICU course complicated by anemia requiring blood product transfusions, epistaxis, volume overload & intubation (tachypnea and increased work of breathing 2/2 pulmonary edema). Status post extubation on 2/24/24. Now showing renal recovery off CVVH and cardiac allograft recovery s/p ECMO decannulation on 2/29/24 w/ Dr. Witt.     She has been started on IVIG + therapeutic plasma exchange (03/05) - 1st of 5 sessions.     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)  Toxo: -/-   Hep C: -/-    Rejection/Prophylaxis (transplants):  - Steroids: Continue 10mg PO prednisone daily  - Tacrolimus: 3.0mg PO @ 0630 & 3.0mg PO @ 1830 w/ daily levels drawn @ 0600  - Serolimus: 2.5mg PO daily w/ daily levels drawn at 0600  - Tacrolimus goal troughs: 3-5  - Serolimus goal troughs: 7-9  - Combined sirolimus/tacrolimus goal of 10-12  - Antifungals: nystatin 500,000units Q6  - Antivirals: continue valcyte 450mg Q48hrs due to low grade viremia   - Anti PCP & Toxoplasmosis: Bactrim SS daily  --> Holding due to thrombocytopenia (2/23)    Last cardiac biopsy: 2/16/24 with  ACR1 and no AMR  Last HLA: 2/16/24 negative for DSAs   Last RHC: 2/16/24 w/ RA 11, PAP 34/14(23), W 19 and C.I. 2.3  Last LHC: 2/18/24 negative for CAV and CAD   Last TTE/BOB: 3/1/24 shows LVEF to 30% and mild RV dysfunction (intra-op impella 5.5 insert)  Osteopenia/osteoporosis prophylaxis: Vitamin D3 and calcium supplements  Peptic/gastric ulcer prophylaxis: Pantoprazole 40mg daily   CAV Prophylaxis: Aspirin 81mg daily & pravastatin daily    - Unclear cause of acute severe graft dysfunction. Most likely smoldering ACR vs. AMR; however, 2xallograft biopsies on 2/16 & 2/20 remain negative for any significant pathology. Notably, both biopsies taken after rejection therapies implemented which may have reduced areas of graft damage.    - DSAs remain negative; however, patient may have non-HLA antibodies present. 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   - Negative CAV & CAD via LHC on 2/18/24   - Completed methylpred steroid pulse w/ 1g Q24 x 3 days (2/16-2/18) and prednisone taper   - Thymoglobulin doses: 2/18 & 2/19   - IVIG + PLEX Session: 2/18, 2/20, 3/7 (planned)   - Extubated on 3/2/24.  - CVVH stopped on 2/27/24. HIRAM previously improving and autodiuresing; however, since ECMO decannulation, UOP has decreased and creatinine uptrending.    - Graft function slightly worse after transition to impella 5.5. IABP removed 3/1/24.  - On Milrinone 0.25mcg/kg/min. Impella up to P9. SGC removed 3/2/24. Team is trying to liberate from lines as able.     Recommendations:  - Maintain current Impella 5.5 P-9 support and milrinone gtt. Continue oral afterload reduction with hydralazine/imdur.  - Continue advanced therapy evaluation (dual heart/kidney transplant).   - IVIG completed today, will have additional session 3/10 and 3/12.  - Once last IVIG session is done, repeat an echocardiogram. If graft function does not appear improved, may consider repeating thymoglobulin infusion.  - Tacrolimus restarted at 1mg BID.  Target tacrolimus trough level: 3-5  - C/w Sirolimus 2.5 mg po daily. Target sirolimus trough level: 7-9  - C/w Prednisone 10 mg po every day.   - Continue valcyte 450mg Q48hrs and continue to check CMV PCRs weekly (next due on 3/8/24)   - Holding off on systemic anticoagulation per CTS.     Patient was examined and discussed with HF attending, Dr. DENNIS Price.     Jason Burns DO  Advanced Heart Failure & Transplant Fellow

## 2024-03-08 NOTE — PROGRESS NOTES
"Charla MONTELONGO Yimi Bowles is a 34 y.o. female on day 22 of admission presenting with Cardiogenic shock (CMS/HCC).    Subjective   Pt. is seen and examined this AM.   Undergoes PT/OT sessions.     Objective   Review of Systems  Physical Exam  Vitals:    03/08/24 1215   BP: 104/85   Pulse: (!) 141   Resp: 24   Temp: 36.7 °C (98.1 °F)   SpO2: 98%   CCO3  HEENT: PERRLA, Total Thyroidectomy Scar   Heart: Faint systolic murmur  Abdo: +BS Soft HM on exam  LE: +edema   Reflexes: Mildly delayed in relaxation phase - improvement noted.   Last Recorded Vitals  Blood pressure 104/85, pulse (!) 141, temperature 36.7 °C (98.1 °F), resp. rate 24, height 1.549 m (5' 0.98\"), weight 92.4 kg (203 lb 11.3 oz), SpO2 98 %.  Intake/Output last 3 Shifts:  I/O last 3 completed shifts:  In: 5836.5 (63.2 mL/kg) [P.O.:500; I.V.:1036.5 (11.2 mL/kg); Other:4300]  Out: 8505 (92 mL/kg) [Urine:245 (0.1 mL/kg/hr); Other:8260]  Weight: 92.4 kg   Relevant Results  Results from last 7 days   Lab Units 03/08/24  0632 03/07/24 2037 03/07/24  1402 03/07/24  0541 03/06/24  2023 03/06/24  1203 03/06/24  0836 03/06/24  0110 03/05/24  1139 03/05/24  0838   POCT GLUCOSE mg/dL 163* 107*  --   --  161*  --  147*  --   --  144*   GLUCOSE mg/dL  --   --  174* 132*  --  178*  --  151* 156*  --      Assessment/Plan   Principal Problem:    Cardiogenic shock (CMS/HCC)  Active Problems:    Heart transplant recipient (CMS/HCC)    ESRD (end stage renal disease) on dialysis (CMS/HCC)    Encounter for aftercare following heart transplant (CMS/HCC)    Heart transplant as cause of abnormal reaction or later complication (CMS/HCC)    Cardiac transplant rejection (CMS/HCC)  Charla MONTELONGO Yimi Bowles is a 33 y.o. female with a PMHx of for stage D HFrEF (PPCM) s/p ICD s/p CardioMEMs, Hypothyroidism s/p Total Thyroidectomy for Compressive Goiter - 2017 - home dose of LT4 of 200 mcg orally daily, obesity s/p gastric bypass (2017), and SLE who is s/p OHT (3/31/2022) with a post-OHT course " "complicated by RIJ/RUE DVTs, leukopenia, left groin seroma, and asymptomatic 2R ACR (11/2022) treated with steroids, s/p total hysterectomy (2023), Flu A (1/2024). Originally presented to [facility] ED on 2/15/24 with complaints of N/V x 3 days and inability to keep medications down. POCUS revealed acute systolic heart failure w/ severe BIV dysfunction when compared to previous images in 11/2023. Also noted to have HIRAM requiring CVVH and transaminitis.  Admitted to HFICU and treated for suspected acute cellular vs. acute antibody mediated rejection; however, cardiac biopsy negative for signs of rejection. Despite rejection therapy, inotropes and MCS via IABP (2/18/24), the patient's end organ function continued to worsen without improvement in cardiac function. Placed left femoral VA ECMO w/ Dr. Marina on 2/19/2024 and transferred to CTICU. CTICU course complicated by anemia requiring blood product transfusions, epistaxis, volume overload & intubation (tachypnea and increased work of breathing 2/2 pulmonary edema). Status post extubation on 2/24/24. Now showing renal recovery off CVVH and cardiac allograft recovery s/p ECMO decannulation on 2/29/24. Endocrine is consulted as TSH had gone up to 35.   Home dose of Levothyroxine: 200 mcg orally daily - pt. Reports compliance to the home dose - taken adequately at least 30 min before breakfast away from the rest of her medications.   She does endorse occasional constipation - denies at the time being.   Energy levels have been fluctuating - \" down\" in the light of current hospitalization. When asked about previous symptomatology, prior to the total thyroidectomy - pt. Reports SOB++++ and inability inability to lie flat.   Endocrinologist: Dr. Irizarry.     # Hx of Goiter with Compressive Sx S/p Total Thyroidectomy in 2017   Home dose of LT4: 200 mcg orally daily - reportedly taken daily prior to hospitalization  # Inpatient Hypothyroidism likely 2/2 to Malabsorption especially " in the setting of exacerbated Cardiac disease and bowel edema  # Hx of Gastric Bypass in 2017  # Obesity Class 2   3/4/24 recommendations provided to increase LT4 of 200 mcg orally daily to 250 mcg orally daily - to be taken 30 minutes before breakfast away from the rest of the medications including Protonix - effective AM 3/5/24  In the light of TSH down to 14. 87 from 35,   Recommendations 3/8/24:   C/W Levothyroxine of 250 mcg orally daily at the time being  Decrease Levothyroxine to 200 mcg orally daily on Monday 3/11/24 with repeat TSH on 3/18/24   In case of sooner discharge  - Repeat TSH as outpatient on 3/18/24  Please arrange follow up with PCP for dose adjustments accordingly.    Plan communicated with Primary Team.  Pager [phone].   Pt. Is discussed with Dr. Abebe  who agrees with the management plan.       I spent 45 minutes in the professional and overall care of this patient.  Johnny Jon MD

## 2024-03-08 NOTE — PROGRESS NOTES
Lang Roberson at [facility] transplant; approved heart txp eval. REF #[id number]. Phone [phone] ext [phone]

## 2024-03-08 NOTE — PROGRESS NOTES
Occupational Therapy    Occupational Therapy Treatment    Name: Charla Bowles  MRN: [id number]  : 1990  Date: 24  Time Calculation  Start Time: 843  Stop Time: 909  Time Calculation (min): 26 min    Assessment:  End of Session Communication: Bedside nurse (PT re pt request for R knee brace)  End of Session Patient Position: Bed, 3 rail up, Alarm off, not on at start of session    Plan:  Treatment Interventions: ADL retraining, Functional transfer training, UE strengthening/ROM, Endurance training, Cognitive reorientation, Patient/family training, Equipment evaluation/education, Neuromuscular reeducation, Compensatory technique education  OT Frequency: 4 times per week  OT Discharge Recommendations: High intensity level of continued care  Equipment Recommended upon Discharge:  (TBD)  OT Recommended Transfer Status: Assist of 1  OT - OK to Discharge: Yes    Subjective     General:  OT Last Visit  OT Received On: 24  Prior to Session Communication: Bedside nurse  Patient Position Received: Bed, 3 rail up, Alarm off, not on at start of session  Family/Caregiver Present: No  General Comment: Pt seated EOB with RN at OT arrival. Requesting to ambulate prior to PLEX today. Showed OT previous R knee brace that pt has worn in the past for instability. OT communicated this to PT. Pt on .25 milrinone, R axillary impella (P-9), flow 5.2. Examined pre and post mobility, stable and with unchanged light bleeding around site.     Precautions:  Medical Precautions: Fall precautions, Cardiac precautions  Precautions Comment: MAP 70-90, protective precautions, R axillary impella precautions (no pushing/pulling, no shoulder flexion or abduction >90 degrees)    Vitals:  Vital Signs  Heart Rate: (!) 142 (Max HR with activity 152; Post: 140)  SpO2: 100 % (Post: 100)  BP: 112/72 (Post: 94/67)  MAP (mmHg): 86 (Post: 77)  Lines/Tubes/Drains:  CVC 24 Triple lumen Right Internal jugular (Active)   Number of  days: 2       Introducer 02/16/24 Internal jugular Right (Active)   Number of days: 21       Ventricular Assist Device Impella 5.5 (Active)   Number of days: 6       Cognition:  Overall Cognitive Status: Within Functional Limits  Orientation Level: Oriented X4  Cognition Comments: Mild cueing for adherence to precautions during transfers and cues for pacing during mobility tasks  Insight: Mild    Pain Assessment:  Pain Assessment  Pain Assessment: 0-10  Pain Score: 0 - No pain     Objective   Activities of Daily Living:        LE Dressing  LE Dressing: Yes  Pants Level of Assistance: Moderate assistance  LE Dressing Where Assessed: Edge of bed  LE Dressing Comments: Thread BLEs into pants seated EOB, pull pants over hips in standing with UE support on FWW     Bed Mobility/Transfers:     Bed Mobility  Bed Mobility: Yes  Bed Mobility 1  Bed Mobility 1: Sitting to supine  Level of Assistance 1: Maximum assistance  Bed Mobility Comments 1: x2 assist    Transfers  Transfer: Yes  Transfer 1  Transfer From 1: Sit to  Transfer to 1: Stand  Transfer Device 1: Walker  Transfer Level of Assistance 1: Minimum assistance  Trials/Comments 1: x2 trials         Therapy/Activity:   Therapeutic Exercise  Therapeutic Exercise Performed: Yes  Therapeutic Exercise Activity 1: Pt educated on distal RUE ROM exercises including elbow flexion/extension, forearm pronation/supination, wrist flexion/extenison, and finger flexion/extension/abduction/adduction. Tolerated well and demonstrated each exercise appropriately. Instructed to perform R shoulder ROM exercises guided with therapy only at this time. Handout provided for review.  Therapeutic Activity  Therapeutic Activity Performed: Yes  Therapeutic Activity 1: [facility] and [facility] for safety, min A x1 for mild LOB that pt attributed to R knee weakness. 3 static standing rest breaks required during task. Pt reported max RPE 4/10 while ambulating. Cues for pacing  provided throughout.  Therapeutic Activity 2: Pt stepped onto scale with LUE support on railing and min A for balance. Increased time required d/t RLE weakness and mild coordination deficits.        Outcome Measures:  [facility] Daily Activity  Putting on and taking off regular lower body clothing: A lot  Bathing (including washing, rinsing, drying): A lot  Putting on and taking off regular upper body clothing: A lot  Toileting, which includes using toilet, bedpan or urinal: A lot  Taking care of personal grooming such as brushing teeth: None  Eating Meals: None  Daily Activity - Total Score: 16     Education Documentation  Handouts, taught by Marcia Thomason OT at 3/8/2024 10:31 AM.  Learner: Patient  Readiness: Acceptance  Method: Explanation, Demonstration  Response: Verbalizes Understanding, Demonstrated Understanding    Body Mechanics, taught by Marcia Thomason OT at 3/8/2024 10:31 AM.  Learner: Patient  Readiness: Acceptance  Method: Explanation, Demonstration  Response: Verbalizes Understanding, Demonstrated Understanding    Precautions, taught by Marcia Thomason OT at 3/8/2024 10:31 AM.  Learner: Patient  Readiness: Acceptance  Method: Explanation, Demonstration  Response: Verbalizes Understanding, Demonstrated Understanding    Home Exercise Program, taught by Marcia Thomason OT at 3/8/2024 10:31 AM.  Learner: Patient  Readiness: Acceptance  Method: Explanation, Demonstration  Response: Verbalizes Understanding, Demonstrated Understanding    ADL Training, taught by Marcia Thomason OT at 3/8/2024 10:31 AM.  Learner: Patient  Readiness: Acceptance  Method: Explanation, Demonstration  Response: Verbalizes Understanding, Demonstrated Understanding    Education Comments  No comments found.        Goals:  Encounter Problems       Encounter Problems (Active)       ADLs       Patient will complete daily grooming tasks in standing with LRAD with supervision level of  assistance and PRN adaptive equipment. (Progressing)       Start:  03/01/24    Expected End:  03/18/24               ADLs       Patient with complete lower body dressing with stand by assist level of assistance donning and doffing all LE clothes  with PRN adaptive equipment (Progressing)       Start:  03/04/24    Expected End:  03/18/24            Patient will complete toileting including hygiene clothing management/hygiene with stand by assist level of assistance. (Progressing)       Start:  03/04/24    Expected End:  03/18/24               BALANCE       Pt will increase dynamic standing tolerance to >10 min with supervision using LRAD during functional mobility/ADLs without LOB in order to improve activity tolerance and balance for self-care tasks.  (Progressing)       Start:  03/01/24    Expected End:  03/18/24               COGNITION/SAFETY       Patient will participate in cognitive activities to demonstrate WFL score on further cognitive assessments, including Medi-Cog, MoCA and remain A&O x3, CAM (-).  (Progressing)       Start:  03/01/24    Expected End:  03/18/24               EXERCISE/STRENGTHENING       Patient will be educated on BUE HEP for increased ADL/IADL performance. (Progressing)       Start:  03/01/24    Expected End:  03/18/24               MOBILITY       Patient will perform Functional mobility max Household distances/Community Distances with supervision level of assistance and least restrictive device in order to improve safety and functional mobility. (Progressing)       Start:  03/01/24    Expected End:  03/18/24 03/08/24 at 10:32 AM   Marcia Thomason, OT   [phone]

## 2024-03-09 NOTE — PROGRESS NOTES
"[facility]  ADVANCED HEART FAILURE AND TRANSPLANT CARDIOLOGY     Charla Bowles is a 33 y.o. female on day 17 of admission presenting with Cardiogenic shock (CMS/HCC).    INTERVAL EVENTS / PERTINENT ROS:    Patient remains on P9 support. Planned for additional PLEX/IVIG today. No major overnight events.    Objective   Suction events earlier this AM. Otherwise she feels well today after getting some sleep last night.      Physical Exam  Constitutional:       General: She is not in acute distress.  HENT:      Head: Normocephalic.      Mouth/Throat:      Mouth: Mucous membranes are moist.   Eyes:      Pupils: Pupils are equal, round, and reactive to light.   Cardiovascular:      Rate and Rhythm: Regular rhythm. Tachycardia present.      Pulses: Normal pulses.      Heart sounds: Murmur heard.      No friction rub. No gallop.      Comments: R axillary 5.5 impella.   Pulmonary:      Effort: Pulmonary effort is normal. No accessory muscle usage or retractions.      Breath sounds: No wheezing, rhonchi or rales.   Abdominal:      General: Abdomen is flat. Bowel sounds are normal. There is no distension.      Palpations: Abdomen is soft. There is no mass.      Tenderness: There is no abdominal tenderness. There is no guarding.      Hernia: No hernia is present.   Musculoskeletal:      Cervical back: Full passive range of motion without pain.   Skin:     General: Skin is warm and dry.      Capillary Refill: Capillary refill takes less than 2 seconds.      Coloration: Skin is not jaundiced.      Findings: No laceration, rash or wound.   Neurological:      General: No focal deficit present.      Mental Status: She is alert.       Last Recorded Vitals  Blood pressure 100/70, pulse (!) 138, temperature 36.2 °C (97.2 °F), temperature source Temporal, resp. rate 24, height 1.549 m (5' 0.98\"), weight 94.1 kg (207 lb 7.3 oz), SpO2 97 %.  Intake/Output last 3 Shifts:  I/O last 3 completed " shifts:  In: 5249.9 (55.8 mL/kg) [I.V.:648.9 (6.9 mL/kg); Other:4351; IV Piggyback:250]  Out: 8454 (89.8 mL/kg) [Urine:155 (0 mL/kg/hr); Other:8299]  Weight: 94.1 kg     Relevant Results  Scheduled medications  aspirin, 81 mg, oral, Daily  calcitriol, 0.5 mcg, oral, Daily  darbepoetin floyd, 100 mcg, subcutaneous, q14 days  ferrous sulfate (325 mg ferrous sulfate), 65 mg of iron, oral, Daily with breakfast  heparin (porcine), 5,000 Units, subcutaneous, q8h  hydrALAZINE, 100 mg, oral, q8h  insulin lispro, 0-10 Units, subcutaneous, Before meals & nightly  [Held by provider] iron sucrose, 200 mg, intravenous, Daily  isosorbide dinitrate, 40 mg, oral, q8h  levothyroxine, 250 mcg, oral, Daily  melatonin, 10 mg, oral, Nightly  multivitamin with minerals, 1 tablet, oral, Daily  nystatin, 5 mL, Swish & Swallow, q6h  pantoprazole, 40 mg, oral, Daily before breakfast  polyethylene glycol, 17 g, oral, BID  predniSONE, 10 mg, oral, Daily  rosuvastatin, 40 mg, oral, Nightly  sennosides-docusate sodium, 2 tablet, oral, BID  sirolimus, 2.5 mg, oral, Daily  sodium chloride, 5 spray, Each Nostril, 4x daily  [Held by provider] sulfamethoxazole-trimethoprim, 80 mg of trimethoprim, oral, Daily  tacrolimus, 1 mg, oral, q12h TRICIA  traZODone, 50 mg, oral, Nightly  valGANciclovir, 450 mg, oral, q48h      Continuous medications  lactated Ringer's, 5 mL/hr, Last Rate: 5 mL/hr (03/09/24 0900)  lactated Ringer's, 10 mL/hr, Last Rate: Stopped (03/03/24 1200)  milrinone, 0.25 mcg/kg/min (Dosing Weight), Last Rate: 0.25 mcg/kg/min (03/09/24 0900)  sodium bicarbonate infusion Impella Purge 25 mEq/1000 mL D5W, 10 mL/hr      PRN medications  PRN medications: acetaminophen, bisacodyl, calcium gluconate, calcium gluconate, dextrose, dextrose **OR** glucagon, glucagon, hydrALAZINE, artificial tears, magnesium sulfate, magnesium sulfate, microfibrllar collagen, mupirocin, ondansetron, oxyCODONE, sodium chloride    Results for orders placed or performed  during the hospital encounter of 02/15/24 (from the past 24 hour(s))   Calcium, Ionized   Result Value Ref Range    POCT Calcium, Ionized 1.11 1.1 - 1.33 mmol/L   CBC   Result Value Ref Range    WBC 9.8 4.4 - 11.3 x10*3/uL    nRBC 1.5 (H) 0.0 - 0.0 /100 WBCs    RBC 2.45 (L) 4.00 - 5.20 x10*6/uL    Hemoglobin 7.3 (L) 12.0 - 16.0 g/dL    Hematocrit 22.1 (L) 36.0 - 46.0 %    MCV 90 80 - 100 fL    MCH 29.8 26.0 - 34.0 pg    MCHC 33.0 32.0 - 36.0 g/dL    RDW 15.3 (H) 11.5 - 14.5 %    Platelets 146 (L) 150 - 450 x10*3/uL   Coagulation Screen   Result Value Ref Range    Protime 14.3 (H) 9.8 - 12.8 seconds    INR 1.3 (H) 0.9 - 1.1    aPTT 39 (H) 27 - 38 seconds   Magnesium   Result Value Ref Range    Magnesium 1.97 1.60 - 2.40 mg/dL   Renal function panel   Result Value Ref Range    Glucose 204 (H) 74 - 99 mg/dL    Sodium 136 136 - 145 mmol/L    Potassium 4.0 3.5 - 5.3 mmol/L    Chloride 93 (L) 98 - 107 mmol/L    Bicarbonate 29 21 - 32 mmol/L    Anion Gap 18 10 - 20 mmol/L    Urea Nitrogen 40 (H) 6 - 23 mg/dL    Creatinine 3.55 (H) 0.50 - 1.05 mg/dL    eGFR 17 (L) >60 mL/min/1.73m*2    Calcium 10.2 8.6 - 10.6 mg/dL    Phosphorus 4.8 2.5 - 4.9 mg/dL    Albumin 3.3 (L) 3.4 - 5.0 g/dL   Calcium, Ionized   Result Value Ref Range    POCT Calcium, Ionized 1.20 1.1 - 1.33 mmol/L   CBC   Result Value Ref Range    WBC 9.8 4.4 - 11.3 x10*3/uL    nRBC 1.7 (H) 0.0 - 0.0 /100 WBCs    RBC 2.39 (L) 4.00 - 5.20 x10*6/uL    Hemoglobin 7.1 (L) 12.0 - 16.0 g/dL    Hematocrit 21.7 (L) 36.0 - 46.0 %    MCV 91 80 - 100 fL    MCH 29.7 26.0 - 34.0 pg    MCHC 32.7 32.0 - 36.0 g/dL    RDW 15.0 (H) 11.5 - 14.5 %    Platelets 148 (L) 150 - 450 x10*3/uL   Pulmonary function testing   Result Value Ref Range    FVC - Predicted 2.98     FEV1 - Predicted 2.56     FVC - PRE 1.92     FEV1 - Pre 1.68    POCT GLUCOSE   Result Value Ref Range    POCT Glucose 190 (H) 74 - 99 mg/dL   POCT GLUCOSE   Result Value Ref Range    POCT Glucose 150 (H) 74 - 99 mg/dL    Lactate Dehydrogenase   Result Value Ref Range     (H) 84 - 246 U/L   Hepatic function panel   Result Value Ref Range    Albumin 3.5 3.4 - 5.0 g/dL    Bilirubin, Total 0.7 0.0 - 1.2 mg/dL    Bilirubin, Direct 0.2 0.0 - 0.3 mg/dL    Alkaline Phosphatase 45 33 - 110 U/L    ALT 7 7 - 45 U/L    AST 36 9 - 39 U/L    Total Protein 5.5 (L) 6.4 - 8.2 g/dL   Calcium, Ionized   Result Value Ref Range    POCT Calcium, Ionized 1.24 1.1 - 1.33 mmol/L   CBC   Result Value Ref Range    WBC 8.1 4.4 - 11.3 x10*3/uL    nRBC 4.6 (H) 0.0 - 0.0 /100 WBCs    RBC 2.21 (L) 4.00 - 5.20 x10*6/uL    Hemoglobin 6.7 (L) 12.0 - 16.0 g/dL    Hematocrit 19.3 (L) 36.0 - 46.0 %    MCV 87 80 - 100 fL    MCH 30.3 26.0 - 34.0 pg    MCHC 34.7 32.0 - 36.0 g/dL    RDW 15.3 (H) 11.5 - 14.5 %    Platelets 160 150 - 450 x10*3/uL   Coagulation Screen   Result Value Ref Range    Protime 13.1 (H) 9.8 - 12.8 seconds    INR 1.2 (H) 0.9 - 1.1    aPTT 25 (L) 27 - 38 seconds   Magnesium   Result Value Ref Range    Magnesium 1.95 1.60 - 2.40 mg/dL   Basic Metabolic Panel   Result Value Ref Range    Glucose 125 (H) 74 - 99 mg/dL    Sodium 132 (L) 136 - 145 mmol/L    Potassium 3.7 3.5 - 5.3 mmol/L    Chloride 91 (L) 98 - 107 mmol/L    Bicarbonate 29 21 - 32 mmol/L    Anion Gap 16 10 - 20 mmol/L    Urea Nitrogen 47 (H) 6 - 23 mg/dL    Creatinine 4.48 (H) 0.50 - 1.05 mg/dL    eGFR 13 (L) >60 mL/min/1.73m*2    Calcium 9.4 8.6 - 10.6 mg/dL   Phosphorus   Result Value Ref Range    Phosphorus 4.6 2.5 - 4.9 mg/dL   POCT GLUCOSE   Result Value Ref Range    POCT Glucose 158 (H) 74 - 99 mg/dL     *Note: Due to a large number of results and/or encounters for the requested time period, some results have not been displayed. A complete set of results can be found in Results Review.         Assessment/Plan   Assessment: Ms. Yimi Bowles is a 33F with a PMHx sig for stage D HFrEF (PPCM) s/p ICD s/p CardioMEMs, hypothyroidism 2/2 thyroidectomy, obesity s/p gastric bypass  (2017), and SLE who is s/p OHT (3/31/2022) with a post-OHT course complicated by RIJ/RUE DVTs, leukopenia, left groin seroma, and asymptomatic 2R ACR (11/2022) treated with steroids, s/p total hysterectomy (2023), Flu A (1/2024).    Originally presented to [facility] ED on 2/15/24 with complaints of N/V x 3 days and inability to keep medications down. POCUS revealed acute systolic heart failure w/ severe BIV dysfunction when compared to previous images in 11/2023. Also noted to have HIRAM requiring CVVH and transaminitis. Immunosuppression notably below therapeutic range. Admitted to HFICU and treated for suspected acute cellular vs. acute antibody mediated rejection; however, cardiac biopsy negative for signs of rejection. Despite rejection therapy, inotropes and MCS via IABP (2/18/24), the patient's end organ function continued to worsen without improvement in cardiac function. Ultimately placed on left femoral VA ECMO w/ Dr. Marina on 2/19/2024 and transferred to CTICU.     CTICU course complicated by anemia requiring blood product transfusions, epistaxis, volume overload & intubation (tachypnea and increased work of breathing 2/2 pulmonary edema). Status post extubation on 2/24/24. Now showing renal recovery off CVVH and cardiac allograft recovery s/p ECMO decannulation on 2/29/24 w/ Dr. Witt.     She has been started on IVIG + therapeutic plasma exchange (03/05) - 1st of 5 sessions.     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)  Toxo: -/-   Hep C: -/-    Rejection/Prophylaxis (transplants):  - Steroids: Continue 10mg PO prednisone daily  - Tacrolimus: Increase to 2.0 mg PO @ 0630 & 2.0  mg PO @ 1830 w/ daily levels drawn @ 0600  - Serolimus: 2.5mg PO daily w/ daily levels drawn at 0600  - Tacrolimus goal troughs: 3-5  - Serolimus goal troughs: 7-9  - Combined sirolimus/tacrolimus goal of 10-12  - Antifungals: nystatin 500,000units Q6  - Antivirals: continue valcyte 450mg Q48hrs due to low grade viremia   - Anti PCP & Toxoplasmosis: Bactrim SS daily  --> Holding due to thrombocytopenia (2/23)    Last cardiac biopsy: 2/16/24 with ACR1 and no AMR  Last HLA: 2/16/24 negative for DSAs   Last RHC: 2/16/24 w/ RA 11, PAP 34/14(23), W 19 and C.I. 2.3  Last LHC: 2/18/24 negative for CAV and CAD   Last TTE/BOB: 3/1/24 shows LVEF to 30% and mild RV dysfunction (intra-op impella 5.5 insert)  Osteopenia/osteoporosis prophylaxis: Vitamin D3 and calcium supplements  Peptic/gastric ulcer prophylaxis: Pantoprazole 40mg daily   CAV Prophylaxis: Aspirin 81mg daily & pravastatin daily    - Unclear cause of acute severe graft dysfunction. Most likely smoldering ACR vs. AMR; however, 2xallograft biopsies on 2/16 & 2/20 remain negative for any significant pathology. Notably, both biopsies taken after rejection therapies implemented which may have reduced areas of graft damage.    - DSAs remain negative; however, patient may have non-HLA antibodies present. 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   - Negative CAV & CAD via LHC on 2/18/24   - Completed methylpred steroid pulse w/ 1g Q24 x 3 days (2/16-2/18) and prednisone taper   - Thymoglobulin doses: 2/18 & 2/19   - IVIG + PLEX Session: 2/18, 2/20, 3/7 (planned)   - Extubated on 3/2/24.  - CVVH stopped on 2/27/24. HIRAM previously improving and autodiuresing; however, since ECMO decannulation, UOP has decreased and creatinine uptrending.    - Graft function slightly worse after transition to impella 5.5. IABP removed 3/1/24.  - On Milrinone 0.25mcg/kg/min. Impella up to P9. SGC removed 3/2/24. Team is trying to liberate from lines as able.     Recommendations:  -  Given sucrtion events, can wean impella support to P8 and assess her response. Continue milrinone gtt. Continue oral afterload reduction with hydralazine/imdur.  - Continue advanced therapy evaluation (dual heart/kidney transplant), not a good candidate at the present given unclear etiology of graft dysfunction, prior issues with immunosuppression, bleeding requiring blood transfusions.   - IVIG completed ongoing, will have additional session 3/10 and 3/12.  - Once last IVIG session is done, repeat an echocardiogram. If graft function does not appear improved, may consider repeating thymoglobulin infusion.  - Tacrolimus increase to 2.0 mg BID. Target tacrolimus trough level: 3-5  - C/w Sirolimus 2.5 mg po daily. Target sirolimus trough level: 7-9  - C/w Prednisone 10 mg po every day.   - Continue valcyte 450mg Q48hrs and continue to check CMV PCRs weekly (next due on 3/8/24)   - Holding off on systemic anticoagulation per CTS.     Patient was examined and discussed with HF attending, Dr. DENNIS Price.     Nick Childress DO PGY-4  Advanced Heart Failure & Transplant Fellow

## 2024-03-09 NOTE — PROGRESS NOTES
CTICU Progress Note  Charla Bowles/[id number]    Admit Date: 2/15/2024  Hospital Length of Stay: 23   ICU Length of Stay: 18d 15h   CT SURGEON: Dr. Marina    SUBJECTIVE:   Hemodynamics acceptable on provided therapies. Remains on milrinone 0.25mcg/kg/min and impella at P9 with suction event overnight treated with 250ml albumin with subsequent reduction to P8.    MEDICATIONS  Infusions:  lactated Ringer's, Last Rate: 5 mL/hr (03/09/24 0600)  lactated Ringer's, Last Rate: Stopped (03/03/24 1200)  milrinone, Last Rate: 0.25 mcg/kg/min (03/09/24 0600)  sodium bicarbonate infusion Impella Purge 25 mEq/1000 mL D5W      Scheduled:  aspirin, 81 mg, Daily  calcitriol, 0.5 mcg, Daily  darbepoetin floyd, 100 mcg, q14 days  ferrous sulfate (325 mg ferrous sulfate), 65 mg of iron, Daily with breakfast  heparin (porcine), 5,000 Units, q8h  hydrALAZINE, 100 mg, q8h  insulin lispro, 0-10 Units, Before meals & nightly  [Held by provider] iron sucrose, 200 mg, Daily  isosorbide dinitrate, 40 mg, q8h  levothyroxine, 250 mcg, Daily  melatonin, 10 mg, Nightly  multivitamin with minerals, 1 tablet, Daily  nystatin, 5 mL, q6h  pantoprazole, 40 mg, Daily before breakfast  polyethylene glycol, 17 g, BID  predniSONE, 10 mg, Daily  rosuvastatin, 40 mg, Nightly  sennosides-docusate sodium, 2 tablet, BID  sirolimus, 2.5 mg, Daily  sodium chloride, 5 spray, 4x daily  [Held by provider] sulfamethoxazole-trimethoprim, 80 mg of trimethoprim, Daily  tacrolimus, 1 mg, q12h TRICIA  traZODone, 50 mg, Nightly  valGANciclovir, 450 mg, q48h      PRN:  acetaminophen, 975 mg, q8h PRN  bisacodyl, 10 mg, Daily PRN  calcium gluconate, 1 g, q6h PRN  calcium gluconate, 2 g, q6h PRN  dextrose, 25 g, q15 min PRN  dextrose, 25 g, q15 min PRN   Or  glucagon, 1 mg, q15 min PRN  glucagon, 1 mg, q15 min PRN  hydrALAZINE, 20 mg, q4h PRN  artificial tears, 2 drop, PRN  magnesium sulfate, 1 g, q6h PRN  magnesium sulfate, 2 g, q6h PRN  microfibrllar collagen, ,  "PRN  mupirocin, , BID PRN  ondansetron, 4 mg, q4h PRN  oxyCODONE, 2.5 mg, q4h PRN  sodium chloride, 1 spray, 4x daily PRN      PHYSICAL EXAM:   Visit Vitals  BP 85/70   Pulse (!) 142   Temp 37.2 °C (99 °F) (Temporal)   Resp 24   Ht 1.549 m (5' 0.98\")   Wt 94.1 kg (207 lb 7.3 oz)   SpO2 99%   BMI 39.22 kg/m²   OB Status Hysterectomy   Smoking Status Never   BSA 2.01 m²     Wt Readings from Last 5 Encounters:   03/08/24 94.1 kg (207 lb 7.3 oz)   12/07/23 92.1 kg (203 lb)   12/01/23 93 kg (205 lb)   11/29/23 92.9 kg (204 lb 12.8 oz)   11/09/23 91.3 kg (201 lb 3.2 oz)     INTAKE/OUTPUT:  I/O last 3 completed shifts:  In: 5249.9 (55.8 mL/kg) [I.V.:648.9 (6.9 mL/kg); Other:4351; IV Piggyback:250]  Out: 8454 (89.8 mL/kg) [Urine:155 (0 mL/kg/hr); Other:8299]  Weight: 94.1 kg        Vent settings:       Physical Exam:   Constitutional: Female patient lying in ICU bed, in no acute distress  Neurological: A+Ox3, no focal deficits, CAM negative  Eyes: Anicteric sclera, clear  Respiratory/Thorax: Diminished, clear breath sounds bilaterally, symmetric chest expansion  Cardiovascular: RRR, -130s, no murmurs appreciated  Gastrointestinal: Abdomen soft, nontender, nondistended, bowel sounds present  Genitourinary: Oliguric  Extremities: Palpable radial pulses 1+, 1+ pulses to bilateral PT/DP, no pitting edema  Skin: Warm and dry throughout  Psych: Calm and cooperative    Daily Risk Screen  Central line: Hemodynamic instability requiring parenteral medication    Images: Reviewed    Invasive Hemodynamics:    Most Recent Range Past 24hrs   BP (Art) 133/102 No data recorded   MAP(Art) 110 mmHg No data recorded   PA 27/19 No data recorded   PA(mean) 22 mmHg No data recorded   CO 4.9 L/min No data recorded   CI 2.5 L/min/m2 No data recorded   Mixed Venous 63 % No data recorded   SVR  1217 (dyne*sec)/cm5 No data recorded     Assessment/Plan   33 year old female with past medical history of heart transplant in March 2022 with " postoperative course complicated by upper extremity/internal jugular DVTs, and 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 She was admitted to HFICU on 2/15 with concern for cardiogenic shock secondary to allograft rejection and decompensated heart failure with multiorgan dysfunction including significant elevation of liver enzymes and nonoliguric acute kidney injury. Endomyocardial biopsy on 2/16 revealed mild ACR with +CD4s and negative HLAs, however multisystem organ failure persisted with increased inotropic requirements. IABP placed on 2/18. Transferred to CTICU on 2/19 for VA ECMO cannulation with Dr. Marina for persistent multi-organ system dysfunction. Intubated 2/21 for respiratory failure 2/2 pulmonary edema, extubated 2/24. ECMO de-cannulated 2/29/24 but had worsening HIRAM and overall declined clinical status and IABP was transitioned to R axillary impella 5.5 3/1. Now tolerating iHD and having second course of Plex/IVIG.    Plan:  NEURO: No history. Neuro intact with minimal pain. Previous insomnia resolved. OOB to chair daily with PT and walking laps. -->  - Serial neuro and pain assessments  - PRN Tylenol 975 mg Q8 PRN  - Continue oxy to 2.5mg Q4 PRN.   - PT/OT Consult  - Continue scheduled melatonin 10 mg nightly and Trazodone 50 mg PRN for insomnia  - CAM ICU score qshift. Sleep/wake cycle hygiene    ENT: Significant epistaxis 2/28 and 3/3 requiring ENT consult. Packing again removed by ENT 3/5. No current evidence of epistaxis.  - Smyth spray 5x day x10 days from replete bleeding  - Prn ocean spray and mupiricin for dry nasal passages    CV: Patient has a history of heart transplant in March of 2022 with TTE on 11/29 with LVEF 60-65%. Admitted for cardiogenic shock with concern for acute cellular rejection s/p IABP placement (R fem) 2/18 and VA ECMO (left fem) 2/19. S/p ECMO decannulation 2/29 with subsequent decline now s/p impella 5.5 and IABP removal 3/1. ECHO 3/1 with EF 30-35% with persisting RV  dysfunction.  Currently on milrinone 0.25 mcg/kg/min with impella at P9/5.2L with stable end organ perfusion. Tachycardia persisting, unchanged despite repeated changes in therapies. -->  - Maintain goal MAP 70-90  - Begin slow wean of impella support as clinically indicated with evidence of adequate end organ perfusion. Wean to P8 today.  - Continue milrinone 0.25  - PO hydralazine 100mg q8h and PRN hydral 20 mg IVP for MAP >90  - Continue isosorbide to 40mg every 8 hours  - Continue home rosuvastatin 40mg daily  - Continue ASA 81 mg daily  - Hold home amlodipine  - Continuing advanced therapy work-up. CT chest completed- f/u final read. Vasc US aorta and carotids (limited by lines) completed. KATHIE pending. PFTs today.  Dental recommending panorex vs facial CT- waiting while Plex and other more time sensitive therapies being completed.    PULM: No history. Acute respiratory failure now resolved, intubated 2/21-2/24. Reintubated for OR 3/1 and extubated 3/2. Remains on RA. -->  - CXR as needed  - Maintain SPO2 > 92%     GI: History of gastric bypass and MMF colitis. Shock liver resolved. Constipation resolved, multiple Bms yesterday. Tolerating diet but reports food has poor taste. -->  - Continue home PPI, calcitriol 0.5mg daily, multivitamin, calcium carbonate 1,250mg BID  - Continue renal diet  - Continue miralax BID & senna-colace BID, ok to hold    : No history, baseline Cr 1.2-1.3. HIRAM originally on CVVH then with renal recovery. Worsened HIRAM after ECMO decannulation now on iHD, last 3/7 with ~2.4L off. Oliguric. -->  - RFP as clinically indicated, replete electrolytes per CTICU protocol. Holding potassium repletion  - Bladder scan daily  - Plan for iHD today  - Nephrology consult    ENDO: History of hypothyroidism and prediabetes. TSH elevated earlier this hospitalization r/t malabsoption; reconsulted 3/4 due to elevated TSH.  Steroid induced hyperglycemia acceptable glycemic control on SSI. -->  - Maintain  BG <180 with hypoglycemia protocol  - Continue SSI  - Continue Synthroid to 250mcg daily. Decrease Levothyroxine to 200 mcg orally daily on Monday 3/11/24 with repeat TSH on 3/18/24 per endocrine.   - Endocrine following    HEME: History of remote DVT; most recent DVT scan 3/3 with acute LIJ DVT and SVT in bilateral cephalic veins. Lower extremity negative. PF4 neg 2/21 (send out to [facility]). Acute on chronic iron deficiency anemia. Acute blood loss anemia with repeated transfusions improved off systemic AC. Last transfusion 3/4. -->    - CBC as clinically indicated  - Stopping ferrous sulfate due to nausea. Venofer x 6 doses (3/5-3/10)  - SQH only per discussion with Dr. Bright 3/7 with new findings of DVT.  - Sodium bicarb purge for impella  - Continue ASA  - SCDs and for DVT prophylaxis  - Aranesp every 2 weeks  - Last type and screen: 3/8    Rejection/prophylaxis: S/p heart transplant 3/31/2022. Induction basiliximab. Donor HCV -, toxo -/-, CMV -/+. Mild ACR with +CD4 and negative HLAs however clinical presentation concern for AMR rejection.  PLEX/IVIG 2/17, 2/20. Thymo 2/18, 2/19. Repeat biopsy 2/20 with no evidence of on going rejection (ACR 0R, pAMR0 and no C3D or C4D). PLEX/IVIG resumed with ongoing c/f rejection- 3/6, 3/7.   - Continue prednisone 10mg daily  - Continue tacrolimus 1mg BID with goal level 3-5  - Continue sirolimus 2.5 mg daily with goal level 7-9  - Continue Nystatin suspension 500,000 units q6hr  - Hold bactrim in setting of thrombocytopenia per Transplant  - Continue valcyte 450mg q48hrs per transplant team for viremia.  F/u repeat CMV DNA PCR  - PLEX (3 out of 5) PLEX & IVIG today. Next sessions: Monday 3/10 and Wednesday 3/12    ID: Received Zosyn and Vancomycin on 2/15 in ED. Blood cultures from 2/15 negative. Mild leukocytosis and afebrile. -->  - Trend temp q4h    Skin: No active skin issues.   - Preventative Mepilex dressings in place on sacrum and heels  - Change preventative Mepilex  weekly or more frequently as indicated (when moist/soiled)   - Every shift skin assessment per nursing and weekly ICU skin rounds  - Moisture barrier to be applied with christopher care  - Active skin problems addressed with nursing on daily rounds    Proph:  SCDs  SQH  Home PPI    G: Lines  RIGIRMA Trialysis - 3/5    Restraints: Not indicated.    Code status: Full Code    I personally spent 45 minutes of critical care time directly and personally managing the patient exclusive of separately billable procedures.     A,B,C,D,E,F,G: reviewed    Discussed with Dr. Banks.  Dispo: CTICU care for now.    CTICU TEAM PHONE [phone]

## 2024-03-09 NOTE — PROGRESS NOTES
Charla Bowles is a 34 y.o. female on day 23 of admission presenting with Cardiogenic shock (CMS/HCC).    Subjective   Patient discussed in morning handover, patient examined and chart reviewed. Meds, labs and radiology reviewed during rapid and full interdisciplinary rounds this morning.    Surgeon: Salas    Full Code    HPI: Patient with a previous history of heart transplant in March of 2022, admitted for cardiogenic shock with concerns for acute cellular rejection and associated multiorgan dysfunction including significant elevation of liver enzymes and nonoliguric acute kidney injury.      Patient presented to [facility] ED on 2/15/24 with complaints of N/V x 3 days and inability to keep medications down. POCUS revealed acute systolic heart failure w/ severe BIV dysfunction when compared to previous images in 11/2023. Also noted to have HIRAM requiring CVVH and transaminitis. Immunosuppression notably below therapeutic range. Admitted to HFICU and treated for suspected acute cellular vs. acute antibody mediated rejection (Endomyocardial biopsy on 2/16 revealed mild ACR with +CD4s and negative HLAs). Despite rejection therapy, inotropes and MCS via IABP (2/18/24), the patient's end organ function continued to worsen without improvement in cardiac function. Ultimately placed on left femoral VA ECMO w/ Dr. Marina on 2/19/2024 and transferred to CTICU.      Course in Hospital:   2/17 - received PLEX and IVIG for presumed rejection  2/18 - s/p IABP placement (R fem); also received thymoglobulin for presumed rejection  2/19 - escalation to VA ECMO (left fem); additional dose of thymo  2/20 -  PLEX/IVIG; TTE - persisting severe BiV dysfunction despite negative biopsy (ACR 0R, pAMR0 and no C3D or C4D)  2/22 - Echo with turn-down; EF 10%, severely reduced RV, mild AR and mild-moderate MR  2/24 - Extubated  2/27 - Repeat turndown (0.8L); LVEF improved to 30% and moderate RV; CVVH stopped and patient responding to IV  diuretics   2/28 - TTE with EF 30% and reduced RV (on 0L flow). Significant epistaxis 2/28 and 3/3 requiring ENT consult. Packing again removed by ENT 3/5   2/29 - ECMO decannulation; Post-decannulation EF reported 44% with IABP 1: 1 and milrinone 125   3/1 Worsening clinical status and oliguria; decision made to take back to the OR for R axillary Impella insertion (5.5) and removal of IABP.  3/2 - Patient extubated; neuro intact  3/5 - [facility] committee discussion - Overall, ongoing concerns for allograft rejection; plan to treat as AMR restarted  intravenous immunoglobulin/plasmapheresis. Heart-kidney retransplantation does not seem to be an immediate option with an unclear etiology of her cardiomyopathy. Also, significant uremia/hyponatremia with worsening creatinine prompting starting IHD.  3/7 - PLEX & IVIG (2 out of 5 planned treatments)    PMH:  stage D HFrEF (PPCM) s/p ICD s/p CardioMEMs,    and SLE who is s/p OHT (3/31/2022) with a post-OHT course complicated by RIJ/RUE DVTs, leukopenia, left groin seroma, and asymptomatic 2R ACR (11/2022) treated with steroids, s/p total hysterectomy (2023), Flu A (1/2024).   Remote DVT   Hypothyroidism 2/2 thyroidectomy  Obesity s/p gastric bypass (2017)  MMF colitis    Overnight: Suction events overnight; Impella reduced to P8         Objective     Physical Exam  Eyes:      Pupils: Pupils are equal, round, and reactive to light.   Cardiovascular:      Rate and Rhythm: Regular rhythm. Tachycardia present.      Comments: Milrinone: 0.25    Lines:  RIJ Trialysis - 3/5    Abdominal:      Palpations: Abdomen is soft.      Tenderness: There is no abdominal tenderness. There is no guarding or rebound.   Genitourinary:     Comments: +478cc/24 hours today  Plan for IHD today  Neurological:      Mental Status: She is alert and oriented to person, place, and time. Mental status is at baseline.         Last Recorded Vitals  Blood pressure 100/70, pulse (!) 138, temperature 36.2 °C  "(97.2 °F), temperature source Temporal, resp. rate 24, height 1.549 m (5' 0.98\"), weight 94.1 kg (207 lb 7.3 oz), SpO2 97 %.  Intake/Output last 3 Shifts:  I/O last 3 completed shifts:  In: 5249.9 (55.8 mL/kg) [I.V.:648.9 (6.9 mL/kg); Other:4351; IV Piggyback:250]  Out: 8454 (89.8 mL/kg) [Urine:155 (0 mL/kg/hr); Other:8299]  Weight: 94.1 kg     Impella:      Most Recent Range Past 24hrs   Performance Level 9 P Level  Min: 9   Min taken time: 03/09/24 0900  Max: 9   Max taken time: 03/09/24 0900   Flow (L/min) 5.2 Flow (L/min)  Min: 5.1   Min taken time: 03/09/24 0800  Max: 5.4   Max taken time: 03/09/24 0600   Motor Current 606/531 Motor Current  Min: 606/531   Min taken time: 03/09/24 0900  Max: 652/518   Max taken time: 03/08/24 2000   Placement Signal Yes  Placement OK could not be evaluated. This SmartLink does not work with rows of the type: Custom List   Purge (mmHg) 477 Purge Pressure (mmHg)  Min: 450   Min taken time: 03/08/24 1000  Max: 551   Max taken time: 03/08/24 2100   Purge rate (mL/hr) 10 Purge Rate (mL/hr)  Min: 10   Min taken time: 03/09/24 0900  Max: 12.9   Max taken time: 03/08/24 1200   Suction events overnight.  down slightly     Assessment/Plan     ICU Liberation Bundle:  A-Analgesia: tylenol prn  B-SBT: extubated  C-RASS Target: 0  D-CAM: negative  E-Mobilization Plan: ambulating in the unit  F - Family Last Update: To be updated by the team    Daily Checklist:  HOB > 30 degrees: extubated  Feeding: p.o. intake  Thromboprophylaxis: Heparin s.c. and SCDs - no systemic heparin in light of recent bleeding complications  Ulcer Prophylaxis:   Glucose Control (Target ): < 180 achieved; no hypoglycemia  Line to removed:  Bowel Care: Bowel regime ordered  De-escalation of Antibiotics: just prophylaxis antimicrobial agents    Plan: Note: current plan is not transplantation; working with Plex and IVIG with initial goals of cardiac recovery    Set Impella to P8  Transplant team to provide " direction on immunosuppressive medications; PLEX & IVIG today, Monday 3/10, Wednesday 3/12   Continue afterload reduction with hydralazine and isordil with goal MAP 70-90   Levothyroxine to 200 mcg orally daily on Monday 3/11/24 with repeat TSH on 3/18/24 per endo  Ongoing ICU rehab    I spent 51 minutes in the professional and overall care of this patient.    This critically ill patient continues to be at-risk for clinically significant deterioration / failure due to the above mentioned dysfunctional, unstable organ systems.  I have personally identified and managed all complex critical care issues to prevent aforementioned clinical deterioration.  Critical care time is spent at bedside and/or the immediate area and has included, but is not limited to, the review of diagnostic tests, labs, radiographs, serial assessments of hemodynamics, respiratory status, ventilatory management, and family updates.  Time spent in procedures and teaching are reported separately.    Eduardo Banks MD

## 2024-03-10 NOTE — PROGRESS NOTES
"Charla Bowles is a 34 y.o. female on day 24 of admission presenting with Cardiogenic shock (CMS/HCC).    Subjective:  Hemodynamics acceptable on provided therapies. Remains on milrinone 0.25mcg/kg/min and impella at P9 with suction event overnight treated with 250ml albumin with subsequent reduction to P8.     Last Recorded Vitals  Blood pressure 106/77, pulse (!) 137, temperature 36.2 °C (97.2 °F), temperature source Temporal, resp. rate 16, height 1.549 m (5' 0.98\"), weight 94.5 kg (208 lb 5.4 oz), SpO2 98 %.  Intake/Output last 3 Shifts:  I/O last 3 completed shifts:  In: 1498.9 (15 mL/kg) [I.V.:848.9 (8.5 mL/kg); Other:400; IV Piggyback:250]  Out: 2430 (24.4 mL/kg) [Urine:330 (0.1 mL/kg/hr); Emesis/NG output:100; Other:2000]  Dosing Weight: 99.6 kg     General: No distress   CVS: S1 S2 no murmurs  RESP:  Lungs CTAB on RA  ABDO: Soft, non-tender   Neuro: A + O x 3  Skin: No rash   Extremities: No edema   Impella in place, getting PLEX  Right Int jug trialysis     Labs:  Results for orders placed or performed during the hospital encounter of 02/15/24 (from the past 24 hour(s))   POCT GLUCOSE   Result Value Ref Range    POCT Glucose 122 (H) 74 - 99 mg/dL   Calcium, Ionized   Result Value Ref Range    POCT Calcium, Ionized 1.17 1.1 - 1.33 mmol/L   CBC   Result Value Ref Range    WBC 9.4 4.4 - 11.3 x10*3/uL    nRBC 4.0 (H) 0.0 - 0.0 /100 WBCs    RBC 2.39 (L) 4.00 - 5.20 x10*6/uL    Hemoglobin 7.2 (L) 12.0 - 16.0 g/dL    Hematocrit 20.8 (L) 36.0 - 46.0 %    MCV 87 80 - 100 fL    MCH 30.1 26.0 - 34.0 pg    MCHC 34.6 32.0 - 36.0 g/dL    RDW 15.6 (H) 11.5 - 14.5 %    Platelets 126 (L) 150 - 450 x10*3/uL   Coagulation Screen   Result Value Ref Range    Protime 12.7 9.8 - 12.8 seconds    INR 1.1 0.9 - 1.1    aPTT 27 27 - 38 seconds   Magnesium   Result Value Ref Range    Magnesium 1.96 1.60 - 2.40 mg/dL   Renal function panel   Result Value Ref Range    Glucose 140 (H) 74 - 99 mg/dL    Sodium 135 (L) 136 - 145 " mmol/L    Potassium 3.9 3.5 - 5.3 mmol/L    Chloride 95 (L) 98 - 107 mmol/L    Bicarbonate 28 21 - 32 mmol/L    Anion Gap 16 10 - 20 mmol/L    Urea Nitrogen 19 6 - 23 mg/dL    Creatinine 2.41 (H) 0.50 - 1.05 mg/dL    eGFR 27 (L) >60 mL/min/1.73m*2    Calcium 9.0 8.6 - 10.6 mg/dL    Phosphorus 2.6 2.5 - 4.9 mg/dL    Albumin 4.0 3.4 - 5.0 g/dL   POCT GLUCOSE   Result Value Ref Range    POCT Glucose 187 (H) 74 - 99 mg/dL   Tacrolimus level   Result Value Ref Range    Tacrolimus  2.0 <=15.0 ng/mL   Sirolimus level   Result Value Ref Range    Sirolimus  5.9 4.0 - 20.0 ng/mL   Lactate Dehydrogenase   Result Value Ref Range     (H) 84 - 246 U/L   Hepatic function panel   Result Value Ref Range    Albumin 3.7 3.4 - 5.0 g/dL    Bilirubin, Total 0.9 0.0 - 1.2 mg/dL    Bilirubin, Direct 0.4 (H) 0.0 - 0.3 mg/dL    Alkaline Phosphatase 53 33 - 110 U/L    ALT 9 7 - 45 U/L    AST 45 (H) 9 - 39 U/L    Total Protein 5.9 (L) 6.4 - 8.2 g/dL   Calcium, Ionized   Result Value Ref Range    POCT Calcium, Ionized 1.13 1.1 - 1.33 mmol/L   CBC   Result Value Ref Range    WBC 8.8 4.4 - 11.3 x10*3/uL    nRBC 7.3 (H) 0.0 - 0.0 /100 WBCs    RBC 2.24 (L) 4.00 - 5.20 x10*6/uL    Hemoglobin 6.7 (L) 12.0 - 16.0 g/dL    Hematocrit 19.9 (L) 36.0 - 46.0 %    MCV 89 80 - 100 fL    MCH 29.9 26.0 - 34.0 pg    MCHC 33.7 32.0 - 36.0 g/dL    RDW 15.9 (H) 11.5 - 14.5 %    Platelets 118 (L) 150 - 450 x10*3/uL   Coagulation Screen   Result Value Ref Range    Protime 13.2 (H) 9.8 - 12.8 seconds    INR 1.2 (H) 0.9 - 1.1    aPTT 24 (L) 27 - 38 seconds   Magnesium   Result Value Ref Range    Magnesium 1.96 1.60 - 2.40 mg/dL   Basic Metabolic Panel   Result Value Ref Range    Glucose 143 (H) 74 - 99 mg/dL    Sodium 132 (L) 136 - 145 mmol/L    Potassium 3.7 3.5 - 5.3 mmol/L    Chloride 93 (L) 98 - 107 mmol/L    Bicarbonate 27 21 - 32 mmol/L    Anion Gap 16 10 - 20 mmol/L    Urea Nitrogen 25 (H) 6 - 23 mg/dL    Creatinine 3.40 (H) 0.50 - 1.05 mg/dL    eGFR 18  "(L) >60 mL/min/1.73m*2    Calcium 8.6 8.6 - 10.6 mg/dL   Phosphorus   Result Value Ref Range    Phosphorus 3.1 2.5 - 4.9 mg/dL   POCT GLUCOSE   Result Value Ref Range    POCT Glucose 159 (H) 74 - 99 mg/dL   Prepare RBC: 1 Units   Result Value Ref Range    PRODUCT CODE Y2535N15     Unit Number [id number]     Unit ABO O     Unit RH POS     XM INTEP COMP     Dispense Status TR     Blood Expiration Date April 05, 2024 23:59 EDT     PRODUCT BLOOD TYPE 5100     UNIT VOLUME 310    POCT GLUCOSE   Result Value Ref Range    POCT Glucose 135 (H) 74 - 99 mg/dL   Calcium, Ionized   Result Value Ref Range    POCT Calcium, Ionized 1.09 (L) 1.1 - 1.33 mmol/L   CBC   Result Value Ref Range    WBC 10.4 4.4 - 11.3 x10*3/uL    nRBC 4.4 (H) 0.0 - 0.0 /100 WBCs    RBC 2.65 (L) 4.00 - 5.20 x10*6/uL    Hemoglobin 8.1 (L) 12.0 - 16.0 g/dL    Hematocrit 23.4 (L) 36.0 - 46.0 %    MCV 88 80 - 100 fL    MCH 30.6 26.0 - 34.0 pg    MCHC 34.6 32.0 - 36.0 g/dL    RDW 15.4 (H) 11.5 - 14.5 %    Platelets 127 (L) 150 - 450 x10*3/uL   Magnesium   Result Value Ref Range    Magnesium 2.23 1.60 - 2.40 mg/dL   Renal function panel   Result Value Ref Range    Glucose 154 (H) 74 - 99 mg/dL    Sodium 132 (L) 136 - 145 mmol/L    Potassium 4.1 3.5 - 5.3 mmol/L    Chloride 91 (L) 98 - 107 mmol/L    Bicarbonate 25 21 - 32 mmol/L    Anion Gap 20 10 - 20 mmol/L    Urea Nitrogen 28 (H) 6 - 23 mg/dL    Creatinine 3.61 (H) 0.50 - 1.05 mg/dL    eGFR 16 (L) >60 mL/min/1.73m*2    Calcium 9.0 8.6 - 10.6 mg/dL    Phosphorus 3.5 2.5 - 4.9 mg/dL    Albumin 4.1 3.4 - 5.0 g/dL   POCT GLUCOSE   Result Value Ref Range    POCT Glucose 200 (H) 74 - 99 mg/dL     *Note: Due to a large number of results and/or encounters for the requested time period, some results have not been displayed. A complete set of results can be found in Results Review.         Assessment/Plan     Charla Silvagarry Bowles is a 33 y.o. 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       HIRAM on CKD stage 3: (baseline Cr 1.2), oliguric   -HIRAM in the setting of CRS, cardiogenic shock.  Started on CRRT on 2/19/2024 for volume management. Discontinued 2/27.  Clearance remains impaired.     Last iHD 3/9 Today    - oliguric despite trial of IV diuretics  - volume status: euvolemic   - has right int jug trialysis  - Monitor daily RFP  - Will evaluate labs and volume status daily for HD indications.   - Continue strict Is/Os    # anemia:   -  continue  venofer 200 mg IV daily for 5 days  - benadryl or zofran 30 mins before  venofer.   - continue aranesp  q2 weeks, last day of 3/6    #BMD:   - calcium WNL and phos elevated      # Immunosuppression:   As per the heart transplant team     # electrolytes  - hyponatremia secondary to impaired free water clearance from kidney injury, also getting IVIG     Cardiogenic shock, off pressors  -S/p endomyocardial biopsies on 2/16 and 2/20 without definitive findings but pt treated for presumed rejection.  DSA negative so far. S/p pulse methylprednisolone 1 g for 3 days from 2/16- 2/18, Thymoglobulin -2 doses given on 2/18 and 2/19, IV immunoglobulin plus PLEX sessions done on 2/18 and 2/20.  - planned for endometrial biopsy 3/6  -s/p [facility] ECMO and on IABP support till 3/1/24. Currently with Impella 3/1/24.  - planned for IVIG and PLEX, next PLEX (4/5) 3/11    Russ Bergeron MD

## 2024-03-10 NOTE — SIGNIFICANT EVENT
Awakened pt and prompted to take medications, pt confirmed she would take them,   When checked on again 15 minutes later, pt had fallen back to sleep without taking medications, RN notified oncoming RN that pt would need additional prompting to take meds.

## 2024-03-10 NOTE — SIGNIFICANT EVENT
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 She was admitted to HFICU on 2/15 with concern for cardiogenic shock secondary to allograft rejection and decompensated heart failure with multiorgan dysfunction including significant elevation of liver enzymes and nonoliguric acute kidney injury. Endomyocardial biopsy on 2/16 revealed mild ACR with +CD4s and negative HLAs, however multisystem organ failure persisted with increased inotropic requirements. IABP placed on 2/18. Transferred to CTICU on 2/19 for [facility] ECMO cannulation with Dr. Marina for persistent multi-organ system dysfunction. Intubated 2/21 for respiratory failure 2/2 pulmonary edema, extubated 2/24. ECMO de-cannulated 2/29/24 but had worsening HIRAM and overall declined clinical status and IABP was transitioned to R axillary impella 5.5 on 3/1. Now tolerating iHD with plan for completion of PLEX/IVIG this Wednesday. Transferred from CTICU to HFICU for further management.    Upon arrival to HFICU, pt. Appears warm and well perfused. Pt. Is alert and oriented and is neurovascularly intact. Pt's R axillary impella has some bleeding under the dressing which is normal per pt. Appears to be a hematoma directly above the insertion site as well which is, per pt. Leftover from IABP removal and is unchanged from how it has previously looked. Pt. Remains hemodynamically supported by impella p7 and milrinone 0.25mcg/kg/min.      Plan:  NEURO: No history. Neuro intact with minimal pain. Previous insomnia resolved. OOB to chair daily with PT and walking laps. -->  - Serial neuro and pain assessments  - PRN Tylenol 975 mg Q8 PRN  - Continue oxy to 2.5mg Q4 PRN.   - PT/OT Consult  - Continue scheduled melatonin 10 mg nightly and Trazodone 50 mg; PRN for insomnia  - CAM ICU score qshift. Sleep/wake  cycle hygiene     ENT: Significant epistaxis 2/28 and 3/3 requiring ENT consult. Packing again removed by ENT 3/5. No current evidence of epistaxis.  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 Weaned to p7 today 3/10.  - Continue milrinone 0.25mcg/kg/min  - c/w PO hydralazine 100mg q8h  - Continue isosorbide 40mg every 8 hours  - Continue home rosuvastatin 40mg daily  - Continue ASA 81 mg daily  - Hold home amlodipine  - Continuing advanced therapy work-up. CT chest completed- f/u final read. Vasc US aorta and carotids (limited by lines) completed. KATHIE pending. PFTs today.  Dental recommending panorex vs facial CT- waiting while Plex and other more time sensitive therapies being completed.    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)  Toxo: -/-   Hep C: -/-     Rejection/Prophylaxis (transplants):  - Steroids: Continue 10mg PO prednisone daily  - Tacrolimus: 2.0 mg PO @ 0630 & 2.0 mg PO @ 1830 w/ daily levels drawn @ 0600  - Sirolimus: Increase dose to 3mg PO daily w/ daily levels drawn at 0600 (3/10)  - Tacrolimus goal troughs: 3-5  - Sirolimus goal troughs: 7-9  - Combined sirolimus/tacrolimus goal of 10-12  - Antifungals: nystatin 500,000units Q6  - Antivirals: continue valcyte 450mg Q48hrs due to low grade viremia   - Anti PCP & Toxoplasmosis: Bactrim SS daily  --> Holding due to thrombocytopenia (2/23)     Last cardiac biopsy: 2/16/24 with ACR1 and no AMR  Last HLA: 2/16/24  negative for DSAs   Last RHC: 2/16/24 w/ RA 11, PAP 34/14(23), W 19 and C.I. 2.3  Last LHC: 2/18/24 negative for CAV and CAD   Last TTE/BOB: 3/1/24 shows LVEF to 30% and mild RV dysfunction (intra-op impella 5.5 insert)  Osteopenia/osteoporosis prophylaxis: Vitamin D3 and calcium supplements  Peptic/gastric ulcer prophylaxis: Pantoprazole 40mg daily   CAV Prophylaxis: Aspirin 81mg daily & pravastatin daily     - Unclear cause of acute severe graft dysfunction. Most likely smoldering ACR vs. AMR; however, 2xallograft biopsies on 2/16 & 2/20 remain negative for any significant pathology. Notably, both biopsies taken after rejection therapies implemented which may have reduced areas of graft damage.    - DSAs remain negative; however, patient may have non-HLA antibodies present. 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   - Negative CAV & CAD via LHC on 2/18/24   - Completed methylpred steroid pulse w/ 1g Q24 x 3 days (2/16-2/18) and prednisone taper   - Thymoglobulin doses: 2/18 & 2/19   - IVIG + PLEX Session: 2/18, 2/20, 3/7, 3/11, 3/13  - CVVH stopped on 2/27/24. HIRAM previously improving and autodiuresing; however, since ECMO decannulation (2/29), UOP has decreased and creatinine uptrending.    - Graft function slightly worse after transition to impella 5.5. IABP removed 3/1/24.  - On Milrinone 0.25mcg/kg/min. Impella P8. SGC removed 3/2/24. Team is trying to liberate from lines as able.      PULM: No history. Acute respiratory failure now resolved, intubated 2/21-2/24. Reintubated for OR 3/1 and extubated 3/2. Remains on RA. -->  - CXR as needed  - Maintain SPO2 > 92%     GI: History of gastric bypass and MMF colitis. Shock liver resolved.   - Continue home PPI, calcitriol 0.5mg daily, multivitamin, calcium carbonate 1,250mg BID  - Continue renal diet  - Continue miralax BID & senna-colace BID  - Fleet enema     :   #HIRAM originally on CVVH then with renal recovery. Worsened HIRAM after ECMO decannulation  now on iHD, last on 3/9 with ~2L off.   -baseline Cr 1.2-1.3.   - RFP as clinically indicated  - Holding potassium repletion  - Bladder scan daily  - Nephrology consult     ENDO:   #T2DM  - Steroid induced hyperglycemia acceptable glycemic control on SSI.   - Maintain BG <180 with hypoglycemia protocol  - Continue SSI    #Hypothyroidism  - TSH elevated earlier this hospitalization r/t malabsoption; reconsulted 3/4 due to elevated TSH.   - Continue Synthroid to 250mcg daily. Decrease dose to 200 mcg orally daily on Monday 3/11/24 with repeat TSH on 3/18/24 per endocrine.   - Endocrine following     HEME:   #History of remote DVT; most recent DVT scan 3/3 with acute LIJ DVT and SVT in bilateral cephalic veins. Lower extremity negative. PF4 neg 2/21 (send out to [facility]). Acute on chronic iron deficiency anemia. Acute blood loss anemia with repeated transfusions improved off systemic AC. Last transfusion 3/4. -->    - CBC as clinically indicated  - Continue ferrous sulfate daily  - SQH only per discussion with Dr. Bright 3/7 with new findings of DVT.  - Sodium bicarb purge for impella  - Continue ASA  - SCDs and for DVT prophylaxis  - Aranesp every 2 weeks  - Last type and screen: 3/8     ID:   #No acute issues  - Received Zosyn and Vancomycin on 2/15 in ED. Blood cultures from 2/15 negative. Mild leukocytosis and afebrile. -->  - Trend temp q4h  -Afebrile and nontoxic     Skin:   #No active skin issues.   - Preventative Mepilex dressings in place on sacrum and heels  - Change preventative Mepilex weekly or more frequently as indicated (when moist/soiled)   - Every shift skin assessment per nursing and weekly ICU skin rounds  - Moisture barrier to be applied with christopher care  - Active skin problems addressed with nursing on daily rounds     PHYSICAL AND OCCUPATIONAL THERAPY: Ordered and Following     LINES:   PIV  RIJ Trialysis catheter-3/5  R Axillary Impella 5.5  DVT: Heparin SQ  VAP BUNDLE: NA  ULCER PPX: PPI  GLYCEMIC  CONTROL: SSI  BOWEL CARE: Sennoside bid and PRN Miralax BID  INDWELLING CATHETER: n/a  NUTRITION: Adult diet Cardiac; 70 gm fat; 2 - 3 grams Sodium     Restraints: Not indicated.     Code status: Full Code    Dr. Price aware of pt's admission

## 2024-03-10 NOTE — PROGRESS NOTES
"[facility]  ADVANCED HEART FAILURE AND TRANSPLANT CARDIOLOGY     Charla Bowles is a 33 y.o. female on day 17 of admission presenting with Cardiogenic shock (CMS/HCC).    INTERVAL EVENTS / PERTINENT ROS:    Patient remains on impella 5.5 support at P8 this AM. No major overnight events.    Objective   Denies new complaints today.       Physical Exam  Constitutional:       General: She is not in acute distress.  HENT:      Head: Normocephalic.      Mouth/Throat:      Mouth: Mucous membranes are moist.   Eyes:      Pupils: Pupils are equal, round, and reactive to light.   Cardiovascular:      Rate and Rhythm: Regular rhythm. Tachycardia present.      Pulses: Normal pulses.      Heart sounds: Murmur heard.      No friction rub. No gallop.      Comments: R axillary 5.5 impella.   Pulmonary:      Effort: Pulmonary effort is normal. No accessory muscle usage or retractions.      Breath sounds: No wheezing, rhonchi or rales.   Abdominal:      General: Abdomen is flat. Bowel sounds are normal. There is no distension.      Palpations: Abdomen is soft. There is no mass.      Tenderness: There is no abdominal tenderness. There is no guarding.      Hernia: No hernia is present.   Musculoskeletal:      Cervical back: Full passive range of motion without pain.   Skin:     General: Skin is warm and dry.      Capillary Refill: Capillary refill takes less than 2 seconds.      Coloration: Skin is not jaundiced.      Findings: No laceration, rash or wound.   Neurological:      General: No focal deficit present.      Mental Status: She is alert.       Last Recorded Vitals  Blood pressure 98/75, pulse (!) 137, temperature 37 °C (98.6 °F), temperature source Temporal, resp. rate 16, height 1.549 m (5' 0.98\"), weight 94.5 kg (208 lb 5.4 oz), SpO2 100 %.  Intake/Output last 3 Shifts:  I/O last 3 completed shifts:  In: 1498.9 (15.9 mL/kg) [I.V.:848.9 (9 mL/kg); Other:400; IV Piggyback:250]  Out: 2430 " (25.7 mL/kg) [Urine:330 (0.1 mL/kg/hr); Emesis/NG output:100; Other:2000]  Weight: 94.5 kg     Relevant Results  Scheduled medications  aspirin, 81 mg, oral, Daily  bisacodyl, 10 mg, rectal, Once  calcitriol, 0.5 mcg, oral, Daily  darbepoetin floyd, 100 mcg, subcutaneous, q14 days  ferrous sulfate (325 mg ferrous sulfate), 65 mg of iron, oral, Daily with breakfast  heparin (porcine), 5,000 Units, subcutaneous, q8h  hydrALAZINE, 100 mg, oral, q8h  insulin lispro, 0-10 Units, subcutaneous, Before meals & nightly  isosorbide dinitrate, 40 mg, oral, q8h  levothyroxine, 250 mcg, oral, Daily  melatonin, 10 mg, oral, Nightly  multivitamin with minerals, 1 tablet, oral, Daily  nystatin, 5 mL, Swish & Swallow, q6h  pantoprazole, 40 mg, oral, Daily before breakfast  polyethylene glycol, 17 g, oral, BID  predniSONE, 10 mg, oral, Daily  rosuvastatin, 40 mg, oral, Nightly  sennosides-docusate sodium, 2 tablet, oral, BID  sirolimus, 2.5 mg, oral, Daily  sodium chloride, 5 spray, Each Nostril, 4x daily  sodium phosphates, 1 enema, rectal, Once  [Held by provider] sulfamethoxazole-trimethoprim, 80 mg of trimethoprim, oral, Daily  tacrolimus, 2 mg, oral, q12h TRICIA  traZODone, 50 mg, oral, Nightly  valGANciclovir, 450 mg, oral, q48h      Continuous medications  lactated Ringer's, 5 mL/hr, Last Rate: 5 mL/hr (03/09/24 2300)  lactated Ringer's, 10 mL/hr, Last Rate: Stopped (03/03/24 1200)  milrinone, 0.25 mcg/kg/min (Dosing Weight), Last Rate: 0.25 mcg/kg/min (03/10/24 0917)  sodium bicarbonate infusion Impella Purge 25 mEq/1000 mL D5W, 10 mL/hr      PRN medications  PRN medications: acetaminophen, bisacodyl, calcium gluconate, calcium gluconate, dextrose, dextrose **OR** glucagon, glucagon, hydrALAZINE, artificial tears, magnesium sulfate, magnesium sulfate, microfibrllar collagen, mupirocin, ondansetron, oxyCODONE, sodium chloride    Results for orders placed or performed during the hospital encounter of 02/15/24 (from the past 24  hour(s))   POCT GLUCOSE   Result Value Ref Range    POCT Glucose 167 (H) 74 - 99 mg/dL   POCT GLUCOSE   Result Value Ref Range    POCT Glucose 122 (H) 74 - 99 mg/dL   Calcium, Ionized   Result Value Ref Range    POCT Calcium, Ionized 1.17 1.1 - 1.33 mmol/L   CBC   Result Value Ref Range    WBC 9.4 4.4 - 11.3 x10*3/uL    nRBC 4.0 (H) 0.0 - 0.0 /100 WBCs    RBC 2.39 (L) 4.00 - 5.20 x10*6/uL    Hemoglobin 7.2 (L) 12.0 - 16.0 g/dL    Hematocrit 20.8 (L) 36.0 - 46.0 %    MCV 87 80 - 100 fL    MCH 30.1 26.0 - 34.0 pg    MCHC 34.6 32.0 - 36.0 g/dL    RDW 15.6 (H) 11.5 - 14.5 %    Platelets 126 (L) 150 - 450 x10*3/uL   Coagulation Screen   Result Value Ref Range    Protime 12.7 9.8 - 12.8 seconds    INR 1.1 0.9 - 1.1    aPTT 27 27 - 38 seconds   Magnesium   Result Value Ref Range    Magnesium 1.96 1.60 - 2.40 mg/dL   Renal function panel   Result Value Ref Range    Glucose 140 (H) 74 - 99 mg/dL    Sodium 135 (L) 136 - 145 mmol/L    Potassium 3.9 3.5 - 5.3 mmol/L    Chloride 95 (L) 98 - 107 mmol/L    Bicarbonate 28 21 - 32 mmol/L    Anion Gap 16 10 - 20 mmol/L    Urea Nitrogen 19 6 - 23 mg/dL    Creatinine 2.41 (H) 0.50 - 1.05 mg/dL    eGFR 27 (L) >60 mL/min/1.73m*2    Calcium 9.0 8.6 - 10.6 mg/dL    Phosphorus 2.6 2.5 - 4.9 mg/dL    Albumin 4.0 3.4 - 5.0 g/dL   POCT GLUCOSE   Result Value Ref Range    POCT Glucose 187 (H) 74 - 99 mg/dL   Lactate Dehydrogenase   Result Value Ref Range     (H) 84 - 246 U/L   Hepatic function panel   Result Value Ref Range    Albumin 3.7 3.4 - 5.0 g/dL    Bilirubin, Total 0.9 0.0 - 1.2 mg/dL    Bilirubin, Direct 0.4 (H) 0.0 - 0.3 mg/dL    Alkaline Phosphatase 53 33 - 110 U/L    ALT 9 7 - 45 U/L    AST 45 (H) 9 - 39 U/L    Total Protein 5.9 (L) 6.4 - 8.2 g/dL   Calcium, Ionized   Result Value Ref Range    POCT Calcium, Ionized 1.13 1.1 - 1.33 mmol/L   CBC   Result Value Ref Range    WBC 8.8 4.4 - 11.3 x10*3/uL    nRBC 7.3 (H) 0.0 - 0.0 /100 WBCs    RBC 2.24 (L) 4.00 - 5.20 x10*6/uL     Hemoglobin 6.7 (L) 12.0 - 16.0 g/dL    Hematocrit 19.9 (L) 36.0 - 46.0 %    MCV 89 80 - 100 fL    MCH 29.9 26.0 - 34.0 pg    MCHC 33.7 32.0 - 36.0 g/dL    RDW 15.9 (H) 11.5 - 14.5 %    Platelets 118 (L) 150 - 450 x10*3/uL   Coagulation Screen   Result Value Ref Range    Protime 13.2 (H) 9.8 - 12.8 seconds    INR 1.2 (H) 0.9 - 1.1    aPTT 24 (L) 27 - 38 seconds   Magnesium   Result Value Ref Range    Magnesium 1.96 1.60 - 2.40 mg/dL   Basic Metabolic Panel   Result Value Ref Range    Glucose 143 (H) 74 - 99 mg/dL    Sodium 132 (L) 136 - 145 mmol/L    Potassium 3.7 3.5 - 5.3 mmol/L    Chloride 93 (L) 98 - 107 mmol/L    Bicarbonate 27 21 - 32 mmol/L    Anion Gap 16 10 - 20 mmol/L    Urea Nitrogen 25 (H) 6 - 23 mg/dL    Creatinine 3.40 (H) 0.50 - 1.05 mg/dL    eGFR 18 (L) >60 mL/min/1.73m*2    Calcium 8.6 8.6 - 10.6 mg/dL   Phosphorus   Result Value Ref Range    Phosphorus 3.1 2.5 - 4.9 mg/dL   POCT GLUCOSE   Result Value Ref Range    POCT Glucose 159 (H) 74 - 99 mg/dL   Prepare RBC: 1 Units   Result Value Ref Range    PRODUCT CODE [id number]     Unit Number [id number]     Unit ABO O     Unit RH POS     XM INTEP COMP     Dispense Status XM     Blood Expiration Date April 05, 2024 23:59 EDT     PRODUCT BLOOD TYPE 5100     UNIT VOLUME 310      *Note: Due to a large number of results and/or encounters for the requested time period, some results have not been displayed. A complete set of results can be found in Results Review.         Assessment/Plan   Assessment: Ms. 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    Originally presented to [facility] ED on 2/15/24 with complaints of N/V x 3 days and  inability to keep medications down. POCUS revealed acute systolic heart failure w/ severe BIV dysfunction when compared to previous images in 11/2023. Also noted to have HIRAM requiring CVVH and transaminitis. Immunosuppression notably below therapeutic range. Admitted to HFICU and treated for suspected acute cellular vs. acute antibody mediated rejection; however, cardiac biopsy negative for signs of rejection. Despite rejection therapy, inotropes and MCS via IABP (2/18/24), the patient's end organ function continued to worsen without improvement in cardiac function. Ultimately placed on left femoral VA ECMO w/ Dr. Marina on 2/19/2024 and transferred to CTICU.     CTICU course complicated by anemia requiring blood product transfusions, epistaxis, volume overload & intubation (tachypnea and increased work of breathing 2/2 pulmonary edema). Status post extubation on 2/24/24. Now showing renal recovery off CVVH and cardiac allograft recovery s/p ECMO decannulation on 2/29/24 w/ Dr. Witt.     She has been started on IVIG + therapeutic plasma exchange (03/05) - 5 sessions. Remaining sessions 3/11 and 3/13.     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)  Toxo: -/-   Hep C: -/-    Rejection/Prophylaxis (transplants):  - Steroids: Continue 10mg PO prednisone daily  - Tacrolimus: 2.0 mg PO @ 0630 & 2.0 mg PO @ 1830 w/ daily levels drawn @ 0600  - Sirolimus: 2.5mg PO daily w/ daily levels drawn at 0600  - Tacrolimus goal troughs: 3-5  - Sirolimus goal troughs: 7-9  - Combined sirolimus/tacrolimus goal of 10-12  - Antifungals: nystatin 500,000units Q6  - Antivirals: continue valcyte 450mg Q48hrs  due to low grade viremia   - Anti PCP & Toxoplasmosis: Bactrim SS daily  --> Holding due to thrombocytopenia (2/23)    Last cardiac biopsy: 2/16/24 with ACR1 and no AMR  Last HLA: 2/16/24 negative for DSAs   Last RHC: 2/16/24 w/ RA 11, PAP 34/14(23), W 19 and C.I. 2.3  Last LHC: 2/18/24 negative for CAV and CAD   Last TTE/BOB: 3/1/24 shows LVEF to 30% and mild RV dysfunction (intra-op impella 5.5 insert)  Osteopenia/osteoporosis prophylaxis: Vitamin D3 and calcium supplements  Peptic/gastric ulcer prophylaxis: Pantoprazole 40mg daily   CAV Prophylaxis: Aspirin 81mg daily & pravastatin daily    - Unclear cause of acute severe graft dysfunction. Most likely smoldering ACR vs. AMR; however, 2xallograft biopsies on 2/16 & 2/20 remain negative for any significant pathology. Notably, both biopsies taken after rejection therapies implemented which may have reduced areas of graft damage.    - DSAs remain negative; however, patient may have non-HLA antibodies present. 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   - Negative CAV & CAD via LHC on 2/18/24   - Completed methylpred steroid pulse w/ 1g Q24 x 3 days (2/16-2/18) and prednisone taper   - Thymoglobulin doses: 2/18 & 2/19   - IVIG + PLEX Session: 2/18, 2/20, 3/7, 3/11, 3/13  - CVVH stopped on 2/27/24. HIRAM previously improving and autodiuresing; however, since ECMO decannulation, UOP has decreased and creatinine uptrending.    - Graft function slightly worse after transition to impella 5.5. IABP removed 3/1/24.  - On Milrinone 0.25mcg/kg/min. Impella P8. SGC removed 3/2/24. Team is trying to liberate from lines as able.     Recommendations:  - Can continue to wean impella support to P7-6 today and assess her response. Continue milrinone gtt. Continue oral afterload reduction with hydralazine/imdur.  - Continue advanced therapy evaluation (dual heart/kidney transplant), not a good candidate at the present given unclear etiology of graft dysfunction, prior issues  with immunosuppression, bleeding requiring blood transfusions.   - IVIG completed ongoing, will have additional session 3/11 and 3/13.  - Repeat a limited echocardiogram 3/11 for LV and MR assessment. If graft function does not appear improved, may consider repeating thymoglobulin infusion.  - Tacrolimus 2.0 mg BID. Target tacrolimus trough level: 3-5  - C/w Sirolimus 2.5 mg po daily. Target sirolimus trough level: 7-9  - C/w Prednisone 10 mg po every day.   - Continue valcyte 450mg Q48hrs and continue to check CMV PCRs weekly (next due on 3/8/24)   - Holding off on systemic anticoagulation per CTS.     Patient was examined and discussed with HF attending, Dr. DENNIS Price.     Nick Childress DO PGY-4  Advanced Heart Failure & Transplant Fellow

## 2024-03-10 NOTE — PROGRESS NOTES
"Charla Bowles is a 34 y.o. female on day 24 of admission presenting with Cardiogenic shock (CMS/HCC).    Subjective:  No events overnight  She had a BM this am.  ?Diuretic today    Last Recorded Vitals  Blood pressure 106/77, pulse (!) 137, temperature 36.2 °C (97.2 °F), temperature source Temporal, resp. rate 16, height 1.549 m (5' 0.98\"), weight 94.5 kg (208 lb 5.4 oz), SpO2 98 %.  Intake/Output last 3 Shifts:  I/O last 3 completed shifts:  In: 1498.9 (15 mL/kg) [I.V.:848.9 (8.5 mL/kg); Other:400; IV Piggyback:250]  Out: 2430 (24.4 mL/kg) [Urine:330 (0.1 mL/kg/hr); Emesis/NG output:100; Other:2000]  Dosing Weight: 99.6 kg     General: No distress   CVS: S1 S2 no murmurs  RESP:  Lungs CTAB on RA  ABDO: Soft, non-tender   Neuro: A + O x 3  Skin: No rash   Extremities: No edema   Impella in place, getting PLEX  Right Int jug trialysis     Labs:  Results for orders placed or performed during the hospital encounter of 02/15/24 (from the past 24 hour(s))   POCT GLUCOSE   Result Value Ref Range    POCT Glucose 122 (H) 74 - 99 mg/dL   Calcium, Ionized   Result Value Ref Range    POCT Calcium, Ionized 1.17 1.1 - 1.33 mmol/L   CBC   Result Value Ref Range    WBC 9.4 4.4 - 11.3 x10*3/uL    nRBC 4.0 (H) 0.0 - 0.0 /100 WBCs    RBC 2.39 (L) 4.00 - 5.20 x10*6/uL    Hemoglobin 7.2 (L) 12.0 - 16.0 g/dL    Hematocrit 20.8 (L) 36.0 - 46.0 %    MCV 87 80 - 100 fL    MCH 30.1 26.0 - 34.0 pg    MCHC 34.6 32.0 - 36.0 g/dL    RDW 15.6 (H) 11.5 - 14.5 %    Platelets 126 (L) 150 - 450 x10*3/uL   Coagulation Screen   Result Value Ref Range    Protime 12.7 9.8 - 12.8 seconds    INR 1.1 0.9 - 1.1    aPTT 27 27 - 38 seconds   Magnesium   Result Value Ref Range    Magnesium 1.96 1.60 - 2.40 mg/dL   Renal function panel   Result Value Ref Range    Glucose 140 (H) 74 - 99 mg/dL    Sodium 135 (L) 136 - 145 mmol/L    Potassium 3.9 3.5 - 5.3 mmol/L    Chloride 95 (L) 98 - 107 mmol/L    Bicarbonate 28 21 - 32 mmol/L    Anion Gap 16 10 - 20 " mmol/L    Urea Nitrogen 19 6 - 23 mg/dL    Creatinine 2.41 (H) 0.50 - 1.05 mg/dL    eGFR 27 (L) >60 mL/min/1.73m*2    Calcium 9.0 8.6 - 10.6 mg/dL    Phosphorus 2.6 2.5 - 4.9 mg/dL    Albumin 4.0 3.4 - 5.0 g/dL   POCT GLUCOSE   Result Value Ref Range    POCT Glucose 187 (H) 74 - 99 mg/dL   Tacrolimus level   Result Value Ref Range    Tacrolimus  2.0 <=15.0 ng/mL   Sirolimus level   Result Value Ref Range    Sirolimus  5.9 4.0 - 20.0 ng/mL   Lactate Dehydrogenase   Result Value Ref Range     (H) 84 - 246 U/L   Hepatic function panel   Result Value Ref Range    Albumin 3.7 3.4 - 5.0 g/dL    Bilirubin, Total 0.9 0.0 - 1.2 mg/dL    Bilirubin, Direct 0.4 (H) 0.0 - 0.3 mg/dL    Alkaline Phosphatase 53 33 - 110 U/L    ALT 9 7 - 45 U/L    AST 45 (H) 9 - 39 U/L    Total Protein 5.9 (L) 6.4 - 8.2 g/dL   Calcium, Ionized   Result Value Ref Range    POCT Calcium, Ionized 1.13 1.1 - 1.33 mmol/L   CBC   Result Value Ref Range    WBC 8.8 4.4 - 11.3 x10*3/uL    nRBC 7.3 (H) 0.0 - 0.0 /100 WBCs    RBC 2.24 (L) 4.00 - 5.20 x10*6/uL    Hemoglobin 6.7 (L) 12.0 - 16.0 g/dL    Hematocrit 19.9 (L) 36.0 - 46.0 %    MCV 89 80 - 100 fL    MCH 29.9 26.0 - 34.0 pg    MCHC 33.7 32.0 - 36.0 g/dL    RDW 15.9 (H) 11.5 - 14.5 %    Platelets 118 (L) 150 - 450 x10*3/uL   Coagulation Screen   Result Value Ref Range    Protime 13.2 (H) 9.8 - 12.8 seconds    INR 1.2 (H) 0.9 - 1.1    aPTT 24 (L) 27 - 38 seconds   Magnesium   Result Value Ref Range    Magnesium 1.96 1.60 - 2.40 mg/dL   Basic Metabolic Panel   Result Value Ref Range    Glucose 143 (H) 74 - 99 mg/dL    Sodium 132 (L) 136 - 145 mmol/L    Potassium 3.7 3.5 - 5.3 mmol/L    Chloride 93 (L) 98 - 107 mmol/L    Bicarbonate 27 21 - 32 mmol/L    Anion Gap 16 10 - 20 mmol/L    Urea Nitrogen 25 (H) 6 - 23 mg/dL    Creatinine 3.40 (H) 0.50 - 1.05 mg/dL    eGFR 18 (L) >60 mL/min/1.73m*2    Calcium 8.6 8.6 - 10.6 mg/dL   Phosphorus   Result Value Ref Range    Phosphorus 3.1 2.5 - 4.9 mg/dL   POCT  "GLUCOSE   Result Value Ref Range    POCT Glucose 159 (H) 74 - 99 mg/dL   Prepare RBC: 1 Units   Result Value Ref Range    PRODUCT CODE [id number]     Unit Number [id number]     Unit ABO O     Unit RH POS     XM INTEP COMP     Dispense Status TR     Blood Expiration Date April 05, 2024 23:59 EDT     PRODUCT BLOOD TYPE 5100     UNIT VOLUME 310    POCT GLUCOSE   Result Value Ref Range    POCT Glucose 135 (H) 74 - 99 mg/dL   Calcium, Ionized   Result Value Ref Range    POCT Calcium, Ionized 1.09 (L) 1.1 - 1.33 mmol/L   CBC   Result Value Ref Range    WBC 10.4 4.4 - 11.3 x10*3/uL    nRBC 4.4 (H) 0.0 - 0.0 /100 WBCs    RBC 2.65 (L) 4.00 - 5.20 x10*6/uL    Hemoglobin 8.1 (L) 12.0 - 16.0 g/dL    Hematocrit 23.4 (L) 36.0 - 46.0 %    MCV 88 80 - 100 fL    MCH 30.6 26.0 - 34.0 pg    MCHC 34.6 32.0 - 36.0 g/dL    RDW 15.4 (H) 11.5 - 14.5 %    Platelets 127 (L) 150 - 450 x10*3/uL   Magnesium   Result Value Ref Range    Magnesium 2.23 1.60 - 2.40 mg/dL   Renal function panel   Result Value Ref Range    Glucose 154 (H) 74 - 99 mg/dL    Sodium 132 (L) 136 - 145 mmol/L    Potassium 4.1 3.5 - 5.3 mmol/L    Chloride 91 (L) 98 - 107 mmol/L    Bicarbonate 25 21 - 32 mmol/L    Anion Gap 20 10 - 20 mmol/L    Urea Nitrogen 28 (H) 6 - 23 mg/dL    Creatinine 3.61 (H) 0.50 - 1.05 mg/dL    eGFR 16 (L) >60 mL/min/1.73m*2    Calcium 9.0 8.6 - 10.6 mg/dL    Phosphorus 3.5 2.5 - 4.9 mg/dL    Albumin 4.1 3.4 - 5.0 g/dL   POCT GLUCOSE   Result Value Ref Range    POCT Glucose 200 (H) 74 - 99 mg/dL     *Note: Due to a large number of results and/or encounters for the requested time period, some results have not been displayed. A complete set of results can be found in Results Review.         Assessment/Plan     Charla Bowles is a 33 y.o. with a history of orthotic heart transplant as \"March 2022 with postoperative course complicated by upper extremity DVT, asymptomatic 2R rejection in November 2022 who currently got admitted with nausea " vomiting and markedly reduced ejection fraction 35%, low cardiac index with elevated filling pressures concerning for cardiogenic shock.       HIRAM on CKD stage 3: (baseline Cr 1.2), oliguric   -HIRAM in the setting of CRS, cardiogenic shock.  Started on CRRT on 2/19/2024 for volume management. Discontinued 2/27.  Clearance remains impaired.     Last iHD 3/9 yesterday    - oliguric despite trial of IV diuretics  - volume status: euvolemic   - has right int jug trialysis  - Monitor daily RFP  - Will evaluate labs and volume status daily for HD indications.   - Continue strict Is/Os    # anemia:   -  continue  venofer 200 mg IV daily for 5 days  - benadryl or zofran 30 mins before  venofer.   - continue aranesp  q2 weeks, last day of 3/6    #BMD:   - calcium WNL and phos elevated      # Immunosuppression:   As per the heart transplant team     # electrolytes  - hyponatremia secondary to impaired free water clearance from kidney injury, also getting IVIG     Cardiogenic shock, off pressors  -S/p endomyocardial biopsies on 2/16 and 2/20 without definitive findings but pt treated for presumed rejection.  DSA negative so far. S/p pulse methylprednisolone 1 g for 3 days from 2/16- 2/18, Thymoglobulin -2 doses given on 2/18 and 2/19, IV immunoglobulin plus PLEX sessions done on 2/18 and 2/20.  - planned for endometrial biopsy 3/6  -s/p VA ECMO and on IABP support till 3/1/24. Currently with Impella 3/1/24.  - planned for IVIG and PLEX, next PLEX (4/5) 3/11    Summary/Attending attestation:    34 yo F s/p heart transplant in March 2022 with postoperative course complicated by upper extremity DVT, asymptomatic 2R rejection in November 2022 who presented with nausea vomiting, HIRAM  and markedly reduced ejection fraction 35%, low cardiac index with elevated filling pressures concerning for cardiogenic shock.  Admitted to HFICU and treated for suspected acute cellular vs. acute antibody mediated rejection; however, cardiac biopsy  negative for signs of rejection. Despite rejection therapy, inotropes and MCS via IABP (2/18/24), the patient's end organ function continued to worsen without improvement in cardiac function. Ultimately placed on left femoral VA ECMO w/ Dr. Marina on 2/19/2024 and transferred to CTICU. Unclear cause of acute severe graft dysfunction. Most likely smoldering ACR vs. AMR and she is getting rejection treatment. We had to start dialysis for sustained HIRAM. Last HD was on Saturday 3/9. Will get next PLEX on Monday. We are planning to dialyze her on Tuesday (non PLEX day). However, please evaluate her volume status to see if she needs HD sooner tomorrow.     Russ Bergeron MD

## 2024-03-10 NOTE — PROGRESS NOTES
Charla Bowles is a 34 y.o. female on day 24 of admission presenting with Cardiogenic shock (CMS/HCC).    Subjective   Patient discussed in morning handover, patient examined and chart reviewed. Meds, labs and radiology reviewed during rapid and full interdisciplinary rounds this morning.    Surgeon: Salas    Full Code    HPI: Patient with a previous history of heart transplant in March of 2022, admitted for cardiogenic shock with concerns for acute cellular rejection and associated multiorgan dysfunction including significant elevation of liver enzymes and nonoliguric acute kidney injury.      Patient presented to [facility] ED on 2/15/24 with complaints of N/V x 3 days and inability to keep medications down. POCUS revealed acute systolic heart failure w/ severe BIV dysfunction when compared to previous images in 11/2023. Also noted to have HIRAM requiring CVVH and transaminitis. Immunosuppression notably below therapeutic range. Admitted to HFICU and treated for suspected acute cellular vs. acute antibody mediated rejection (Endomyocardial biopsy on 2/16 revealed mild ACR with +CD4s and negative HLAs). Despite rejection therapy, inotropes and MCS via IABP (2/18/24), the patient's end organ function continued to worsen without improvement in cardiac function. Ultimately placed on left femoral VA ECMO w/ Dr. Marina on 2/19/2024 and transferred to CTICU.      Course in Hospital:   2/17 - received PLEX and IVIG for presumed rejection  2/18 - s/p IABP placement (R fem); also received thymoglobulin for presumed rejection  2/19 - escalation to VA ECMO (left fem); additional dose of thymo  2/20 -  PLEX/IVIG; TTE - persisting severe BiV dysfunction despite negative biopsy (ACR 0R, pAMR0 and no C3D or C4D)  2/22 - Echo with turn-down; EF 10%, severely reduced RV, mild AR and mild-moderate MR  2/24 - Extubated  2/27 - Repeat turndown (0.8L); LVEF improved to 30% and moderate RV; CVVH stopped and patient responding to IV  diuretics   2/28 - TTE with EF 30% and reduced RV (on 0L flow). Significant epistaxis 2/28 and 3/3 requiring ENT consult. Packing again removed by ENT 3/5   2/29 - ECMO decannulation; Post-decannulation EF reported 44% with IABP 1: 1 and milrinone 125   3/1 Worsening clinical status and oliguria; decision made to take back to the OR for R axillary Impella insertion (5.5) and removal of IABP.  3/2 - Patient extubated; neuro intact  3/5 - [facility] committee discussion - Overall, ongoing concerns for allograft rejection; plan to treat as AMR restarted  intravenous immunoglobulin/plasmapheresis. Heart-kidney retransplantation does not seem to be an immediate option with an unclear etiology of her cardiomyopathy. Also, significant uremia/hyponatremia with worsening creatinine prompting starting IHD.  3/7 - PLEX & IVIG (2 out of 5 planned treatments)    PMH:  stage D HFrEF (PPCM) s/p ICD s/p CardioMEMs,    and SLE who is s/p OHT (3/31/2022) with a post-OHT course complicated by RIJ/RUE DVTs, leukopenia, left groin seroma, and asymptomatic 2R ACR (11/2022) treated with steroids, s/p total hysterectomy (2023), Flu A (1/2024).   Remote DVT   Hypothyroidism 2/2 thyroidectomy  Obesity s/p gastric bypass (2017)  MMF colitis    Overnight: Currently on P8. IHD yesterday. Hemodynamically acceptable with current therapies       Objective     Physical Exam  Eyes:      Pupils: Pupils are equal, round, and reactive to light.   Cardiovascular:      Rate and Rhythm: Regular rhythm. Tachycardia present.      Comments: Milrinone: 0.25    Lines:  RIJ Trialysis - 3/5    Pulmonary:      Effort: Pulmonary effort is normal.      Breath sounds: Normal breath sounds.   Abdominal:      Palpations: Abdomen is soft.      Tenderness: There is no abdominal tenderness. There is no guarding or rebound.   Genitourinary:     Comments: -1.3L/24 hours  Skin:     Comments: No active skin breakdown issues   Neurological:      Mental Status: She is alert and  "oriented to person, place, and time. Mental status is at baseline.      Comments: CAM-ICU -  RASS 0  Ambulating the unit       Last Recorded Vitals  Blood pressure 115/75, pulse (!) 133, temperature 37 °C (98.6 °F), temperature source Temporal, resp. rate 16, height 1.549 m (5' 0.98\"), weight 94.5 kg (208 lb 5.4 oz), SpO2 99 %.  Intake/Output last 3 Shifts:  I/O last 3 completed shifts:  In: 1498.9 (15.9 mL/kg) [I.V.:848.9 (9 mL/kg); Other:400; IV Piggyback:250]  Out: 2430 (25.7 mL/kg) [Urine:330 (0.1 mL/kg/hr); Emesis/NG output:100; Other:2000]  Weight: 94.5 kg     Impella Interrogation :      Most Recent Range Past 24hrs   Performance Level 8 P Level  Min: 8   Min taken time: 03/10/24 0800  Max: 9   Max taken time: 03/09/24 0900   Flow (L/min) 4.7 Flow (L/min)  Min: 4.3   Min taken time: 03/10/24 0000  Max: 5.2   Max taken time: 03/09/24 1000   Motor Current 491/410 Motor Current  Min: 460/385   Min taken time: 03/10/24 0300  Max: 631/522   Max taken time: 03/09/24 1200   Placement Signal Yes  Placement OK could not be evaluated. This SmartLink does not work with rows of the type: Custom List   Purge (mmHg) 469 Purge Pressure (mmHg)  Min: 404   Min taken time: 03/09/24 1800  Max: 523   Max taken time: 03/09/24 1300   Purge rate (mL/hr) 11.3 Purge Rate (mL/hr)  Min: 10   Min taken time: 03/09/24 0900  Max: 11.3   Max taken time: 03/10/24 0800    No suction events overnight. LDH up to 557 (up from 420 yesterday)     Assessment/Plan     ICU Liberation Bundle:  A-Analgesia: tylenol prn  B-SBT: extubated  C-RASS Target: 0  D-CAM: negative  E-Mobilization Plan: ambulating in the unit  F - Family Last Update: To be updated by the team    Daily Checklist:  HOB > 30 degrees: extubated  Feeding: p.o. intake  Thromboprophylaxis: Heparin s.c. and SCDs - no systemic heparin in light of recent bleeding complications  Ulcer Prophylaxis:   Glucose Control (Target ): < 180 achieved; no hypoglycemia  Line to removed:  Bowel " Care: Bowel regime ordered  De-escalation of Antibiotics: just prophylaxis antimicrobial agents    Plan: Note: current plan is not transplantation; working with Plex and IVIG with initial goals of cardiac recovery    Set Impella to P7 today; maintain milrinone at 0.25; transfuse 1 unit for Hb of 6.7  Transplant team to provide direction on immunosuppressive medications; PLEX & IVIG tomorrow and Wednesday  Continue afterload reduction with hydralazine and isordil with goal MAP 70-90   Levothyroxine to 200 mcg orally daily on Monday 3/11/24 with repeat TSH on 3/18/24 per endo  Ongoing ICU rehab  Plan for TTE tomorrow per HF request  Plan to transfer to HFICU today if bed available    I spent 49 minutes in the professional and overall care of this patient.    This critically ill patient continues to be at-risk for clinically significant deterioration / failure due to the above mentioned dysfunctional, unstable organ systems.  I have personally identified and managed all complex critical care issues to prevent aforementioned clinical deterioration.  Critical care time is spent at bedside and/or the immediate area and has included, but is not limited to, the review of diagnostic tests, labs, radiographs, serial assessments of hemodynamics, respiratory status, ventilatory management, and family updates.  Time spent in procedures and teaching are reported separately.    Eduardo Banks MD

## 2024-03-10 NOTE — PROGRESS NOTES
CTICU Progress/Transfer off service Note  Charla Bowles/[id number]    Admit Date: 2/15/2024  Hospital Length of Stay: 24   ICU Length of Stay: 19d 14h   CT SURGEON: Dr. Marina    SUBJECTIVE:   Hemodynamics acceptable on provided therapies.    MEDICATIONS  Infusions:  lactated Ringer's, Last Rate: 5 mL/hr (03/09/24 2300)  lactated Ringer's, Last Rate: Stopped (03/03/24 1200)  milrinone, Last Rate: 0.25 mcg/kg/min (03/10/24 0700)  sodium bicarbonate infusion Impella Purge 25 mEq/1000 mL D5W      Scheduled:  aspirin, 81 mg, Daily  calcitriol, 0.5 mcg, Daily  darbepoetin floyd, 100 mcg, q14 days  ferrous sulfate (325 mg ferrous sulfate), 65 mg of iron, Daily with breakfast  heparin (porcine), 5,000 Units, q8h  hydrALAZINE, 100 mg, q8h  insulin lispro, 0-10 Units, Before meals & nightly  [Held by provider] iron sucrose, 200 mg, Daily  isosorbide dinitrate, 40 mg, q8h  levothyroxine, 250 mcg, Daily  melatonin, 10 mg, Nightly  multivitamin with minerals, 1 tablet, Daily  nystatin, 5 mL, q6h  pantoprazole, 40 mg, Daily before breakfast  polyethylene glycol, 17 g, BID  predniSONE, 10 mg, Daily  rosuvastatin, 40 mg, Nightly  sennosides-docusate sodium, 2 tablet, BID  sirolimus, 2.5 mg, Daily  sodium chloride, 5 spray, 4x daily  [Held by provider] sulfamethoxazole-trimethoprim, 80 mg of trimethoprim, Daily  tacrolimus, 2 mg, q12h TRICIA  traZODone, 50 mg, Nightly  valGANciclovir, 450 mg, q48h      PRN:  acetaminophen, 975 mg, q8h PRN  bisacodyl, 10 mg, Daily PRN  calcium gluconate, 1 g, q6h PRN  calcium gluconate, 2 g, q6h PRN  dextrose, 25 g, q15 min PRN  dextrose, 25 g, q15 min PRN   Or  glucagon, 1 mg, q15 min PRN  glucagon, 1 mg, q15 min PRN  hydrALAZINE, 20 mg, q4h PRN  artificial tears, 2 drop, PRN  magnesium sulfate, 1 g, q6h PRN  magnesium sulfate, 2 g, q6h PRN  microfibrllar collagen, , PRN  mupirocin, , BID PRN  ondansetron, 4 mg, q4h PRN  oxyCODONE, 2.5 mg, q4h PRN  sodium chloride, 1 spray, 4x daily  "PRN      PHYSICAL EXAM:   Visit Vitals  BP (!) 110/91   Pulse (!) 135   Temp 36.4 °C (97.5 °F) (Temporal)   Resp 24   Ht 1.549 m (5' 0.98\")   Wt 94.5 kg (208 lb 5.4 oz)   SpO2 100%   BMI 39.39 kg/m²   OB Status Hysterectomy   Smoking Status Never   BSA 2.02 m²     Wt Readings from Last 5 Encounters:   03/10/24 94.5 kg (208 lb 5.4 oz)   12/07/23 92.1 kg (203 lb)   12/01/23 93 kg (205 lb)   11/29/23 92.9 kg (204 lb 12.8 oz)   11/09/23 91.3 kg (201 lb 3.2 oz)     INTAKE/OUTPUT:  I/O last 3 completed shifts:  In: 1448.9 (15.3 mL/kg) [I.V.:798.9 (8.5 mL/kg); Other:400; IV Piggyback:250]  Out: 2430 (25.7 mL/kg) [Urine:330 (0.1 mL/kg/hr); Emesis/NG output:100; Other:2000]  Weight: 94.5 kg          Vent settings:       Physical Exam:   Constitutional: Female patient lying in ICU bed, in no acute distress  Neurological: A+Ox3, no focal deficits, CAM negative  Eyes: Anicteric sclera, clear  Respiratory/Thorax: Diminished, clear breath sounds bilaterally, symmetric chest expansion  Cardiovascular: RRR, -130s, no murmurs appreciated  Gastrointestinal: Abdomen soft, some discomfort, nondistended, bowel sounds present  Genitourinary: Oliguric  Extremities: Palpable radial pulses 1+, 1+ pulses to bilateral PT/DP, no pitting edema  Skin: Warm and dry throughout  Psych: Calm and cooperative    Daily Risk Screen  Central line: Hemodynamic instability requiring parenteral medication    Images: Reviewed    Invasive Hemodynamics:    Most Recent Range Past 24hrs   BP (Art) 133/102 No data recorded   MAP(Art) 110 mmHg No data recorded   PA 27/19 No data recorded   PA(mean) 22 mmHg No data recorded   CO 4.9 L/min No data recorded   CI 2.5 L/min/m2 No data recorded   Mixed Venous 63 % No data recorded   SVR  1217 (dyne*sec)/cm5 No data recorded     Assessment/Plan   33 year old female with past medical history of heart transplant in March 2022 with postoperative course complicated by upper extremity/internal jugular DVTs, and " 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 She was admitted to HFICU on 2/15 with concern for cardiogenic shock secondary to allograft rejection and decompensated heart failure with multiorgan dysfunction including significant elevation of liver enzymes and nonoliguric acute kidney injury. Endomyocardial biopsy on 2/16 revealed mild ACR with +CD4s and negative HLAs, however multisystem organ failure persisted with increased inotropic requirements. IABP placed on 2/18. Transferred to CTICU on 2/19 for VA ECMO cannulation with Dr. Marina for persistent multi-organ system dysfunction. Intubated 2/21 for respiratory failure 2/2 pulmonary edema, extubated 2/24. ECMO de-cannulated 2/29/24 but had worsening HIRAM and overall declined clinical status and IABP was transitioned to R axillary impella 5.5 3/1. Now tolerating iHD with plan for completion of PLEX/IVIG this Wednesday.    Plan:  NEURO: No history. Neuro intact with minimal pain. Previous insomnia resolved. OOB to chair daily with PT and walking laps. -->  - Serial neuro and pain assessments  - PRN Tylenol 975 mg Q8 PRN  - Continue oxy to 2.5mg Q4 PRN.   - PT/OT Consult  - Continue scheduled melatonin 10 mg nightly and Trazodone 50 mg; PRN for insomnia  - CAM ICU score qshift. Sleep/wake cycle hygiene    ENT: Significant epistaxis 2/28 and 3/3 requiring ENT consult. Packing again removed by ENT 3/5. No current evidence of epistaxis.  - [place] spray 5x day x10 days from replete bleeding  - Prn ocean spray and mupiricin for dry nasal passages    CV: Patient has a history of heart transplant in March of 2022 with TTE on 11/29 with LVEF 60-65%. Admitted for cardiogenic shock with concern for acute cellular rejection s/p IABP placement (R fem) 2/18 and VA ECMO (left fem) 2/19. S/p ECMO decannulation 2/29 with subsequent decline now s/p impella 5.5 and IABP removal 3/1. ECHO 3/1 with EF 30-35% with persisting RV dysfunction. Currently on milrinone 0.25 mcg/kg/min with impella  at P8/4.7L with stable end organ perfusion. Tachycardia persisting in the 130s. -->  - Maintain goal MAP 70-90  - Continue slow wean of impella support as clinically indicated with evidence of adequate end organ perfusion. Wean to p7 today.  - Continue milrinone 0.25mcg/kg/min  - PO hydralazine 100mg q8h and PRN hydral 20 mg IVP for MAP >90  - Continue isosorbide to 40mg every 8 hours  - Continue home rosuvastatin 40mg daily  - Continue ASA 81 mg daily  - Hold home amlodipine  - Continuing advanced therapy work-up. CT chest completed- f/u final read. Vasc US aorta and carotids (limited by lines) completed. KATHIE pending. PFTs today.  Dental recommending panorex vs facial CT- waiting while Plex and other more time sensitive therapies being completed.    PULM: No history. Acute respiratory failure now resolved, intubated 2/21-2/24. Reintubated for OR 3/1 and extubated 3/2. Remains on RA. -->  - CXR as needed  - Maintain SPO2 > 92%    GI: History of gastric bypass and MMF colitis. Shock liver resolved. Tolerating diet but reports food has poor taste. Requesting enema this morning for some abdominal discomfort. Refusing PO bowel regimen due to reports of nausea. -->  - Continue home PPI, calcitriol 0.5mg daily, multivitamin, calcium carbonate 1,250mg BID  - Continue renal diet  - Continue miralax BID & senna-colace BID  - Fleet enema    : No history, baseline Cr 1.2-1.3. HIRAM originally on CVVH then with renal recovery. Worsened HIRAM after ECMO decannulation now on iHD, last on 3/9 with ~2L off. Oliguric. 3+ pitting edema in bilateral lower extremities. -->  - RFP as clinically indicated, replete electrolytes per CTICU protocol. Holding potassium repletion  - Bladder scan daily  - Nephrology consult    ENDO: History of hypothyroidism and prediabetes. TSH elevated earlier this hospitalization r/t malabsoption; reconsulted 3/4 due to elevated TSH. Steroid induced hyperglycemia acceptable glycemic control on SSI. -->  -  Maintain BG <180 with hypoglycemia protocol  - Continue SSI  - Continue Synthroid to 250mcg daily. Decrease Levothyroxine to 200 mcg orally daily on Monday 3/11/24 with repeat TSH on 3/18/24 per endocrine.   - Endocrine following    HEME: History of remote DVT; most recent DVT scan 3/3 with acute LIJ DVT and SVT in bilateral cephalic veins. Lower extremity negative. PF4 neg 2/21 (send out to [facility]). Acute on chronic iron deficiency anemia. Acute blood loss anemia with repeated transfusions improved off systemic AC. Last transfusion 3/4. -->    - CBC as clinically indicated  - Continue ferrous sulfate daily  - SQH only per discussion with Dr. Bright 3/7 with new findings of DVT.  - Sodium bicarb purge for impella  - Continue ASA  - SCDs and for DVT prophylaxis  - Aranesp every 2 weeks  - Last type and screen: 3/8    Rejection/prophylaxis: S/p heart transplant 3/31/2022. Induction basiliximab. Donor HCV -, toxo -/-, CMV -/+. Mild ACR with +CD4 and negative HLAs however clinical presentation concern for AMR rejection.  PLEX/IVIG 2/17, 2/20. Thymo 2/18, 2/19. Repeat biopsy 2/20 with no evidence of on going rejection (ACR 0R, pAMR0 and no C3D or C4D). PLEX/IVIG resumed with ongoing c/f rejection- 3/6, 3/7.   - Continue prednisone 10mg daily  - Continue tacrolimus 2mg BID with goal level 3-5  - Continue sirolimus 2.5 mg daily with goal level 7-9  - Continue Nystatin suspension 500,000 units q6hr  - Hold bactrim in setting of thrombocytopenia per Transplant  - Continue valcyte 450mg q48hrs per transplant team for viremia.  F/u repeat CMV DNA PCR  - Next PLEX/IVIG sessions: Monday 3/10 and Wednesday 3/12    ID: Received Zosyn and Vancomycin on 2/15 in ED. Blood cultures from 2/15 negative. Mild leukocytosis and afebrile. -->  - Trend temp q4h    Skin: No active skin issues.   - Preventative Mepilex dressings in place on sacrum and heels  - Change preventative Mepilex weekly or more frequently as indicated (when  moist/soiled)   - Every shift skin assessment per nursing and weekly ICU skin rounds  - Moisture barrier to be applied with christopher care  - Active skin problems addressed with nursing on daily rounds    Proph:  SCDs  SQH  Home PPI    G: Lines  RIJ Trialysis - 3/5    Restraints: Not indicated.    Code status: Full Code    I personally spent 45 minutes of critical care time directly and personally managing the patient exclusive of separately billable procedures.    A,B,C,D,E,F,G: reviewed    Discussed with Dr. Banks.  Dispo: CTICU care for now.    CTICU TEAM PHONE [phone]

## 2024-03-11 PROBLEM — R93.1 ABNORMAL FINDINGS ON DIAGNOSTIC IMAGING OF HEART AND CORONARY CIRCULATION: Status: ACTIVE | Noted: 2024-01-01

## 2024-03-11 NOTE — POST-PROCEDURE NOTE
Physician Transition of Care Summary  Invasive Cardiovascular Lab    Procedure Date: 3/11/2024  Attending:    * John Kim - Primary  Resident/Fellow/Other Assistant: Surgeon(s) and Role:     * Tomas Moran MD - Fellow    Indications:   Pre-op Diagnosis     * Cardiogenic shock (CMS/HCC) [R57.0]     * Abnormal findings on diagnostic imaging of heart and coronary circulation [R93.1]    Post-procedure diagnosis:   Post-op Diagnosis     * Cardiogenic shock (CMS/HCC) [R57.0]     * Abnormal findings on diagnostic imaging of heart and coronary circulation [R93.1]    Procedure(s):   Right Heart Cath  83974 - PA RIGHT HEART CATH O2 SATURATION & CARDIAC OUTPUT    Procedure aborted - Cath        Procedure Findings:   Long segment of thrombus in the left internal jugular  She also has a proximal DVT potentially near proximal subclavian so we did not attempt a brachial.    Discussed with Dr. Melo, aborted procedure.      Stents/Implants:   Cardiovascular Implants       Pacemaker    Lead, Pacing, Cardiac, Temporary, W/Suture, Flexon, 2-0, 24 Case [id number] - Implanted        Model/Cat number: [id number] : [employer]    Lot number: [id number] Implant Date: 3/31/2022    Description: Converted from [facility]. Please see archived information for full log information.        As of 9/1/2023       Status: Unknown                      Lead, Pacing, Myocardial, Bipolar, Temporary Case [id number] - Implanted        Model/Cat number: [id number] : [employer]    Implant Date: 3/31/2022 Description: Converted from [facility]. Please see archived information for full log information.      As of 9/1/2023       Status: Unknown                      Other Cardiac Implant    Delivery System, Cardiomems Pa Sensor, Includes [id number], [id number]/22/2020 - Implanted        Inventory item: DELIVERY SYSTEM, CARDIOMEMS PA SENSOR, INCLUDES [id number], [id number] Model/Cat number: CM PATIENT SYSTEM    Serial number:  [id number] : [employer]    Lot number: [id number] / [id number]        As of 11/13/2023       Status: Unknown                As of 5/22/2020       Status: Implanted                      Device, Impella 5.5 S2, W/Smartassist Set - [id number] - Implanted        Inventory item: DEVICE, IMPELLA 5.5 S2, W/SMARTASSIST SET Model/Cat number: [id number]    Serial number: [id number] : [employer]      As of 3/1/2024       Status: Implanted                                Estimated Blood Loss:   0 mL    Anesthesia: Moderate Sedation Anesthesia Staff: No anesthesia staff entered.    Any Specimen(s) Removed:   No specimens collected during this procedure.    Disposition:   Back to  ICU      Electronically signed by: Tomas Moran MD, 3/11/2024 4:23 PM

## 2024-03-11 NOTE — PROGRESS NOTES
Occupational Therapy    Occupational Therapy Treatment    Name: Charla Bowles  MRN: [id number]  : 1990  Date: 24  Time Calculation  Start Time: 1422  Stop Time: 1501  Time Calculation (min): 39 min    Assessment:  End of Session Communication: Bedside nurse  End of Session Patient Position: Up in chair, Alarm off, not on at start of session  Plan:  Treatment Interventions: ADL retraining, Functional transfer training, UE strengthening/ROM, Endurance training, Cognitive reorientation, Patient/family training, Equipment evaluation/education, Neuromuscular reeducation, Compensatory technique education  OT Frequency: 4 times per week  OT Discharge Recommendations: High intensity level of continued care  Equipment Recommended upon Discharge:  (TBD)  OT Recommended Transfer Status: Assist of 1  OT - OK to Discharge: Yes    Subjective   Previous Visit Info:  OT Last Visit  OT Received On: 24  General:  General  Family/Caregiver Present: No  Co-Treatment: PT  Co-Treatment Reason: to maximize mobility safely 2/2 axillary Impella and loading of Benadryl  Prior to Session Communication: Bedside nurse  Patient Position Received: Bed, 3 rail up, Alarm off, not on at start of session  General Comment: Pleasant and motivated for therapy. (R) axillary Impella (P-5, purge flow 10/9 ml/hr)- dressings secured but blood in dressing, RN aware. Milrinone .25 mcg and IVIG  Precautions:  Medical Precautions: Cardiac precautions, Fall precautions  Precautions Comment: R Axillary Impella precautions- no R humeral FLEX/ABD >90 degrees, no R UE humeral EXT past plane of body, No Pushing/pulling/lifting with R UE.  Vitals:  Vital Signs  Heart Rate:  (PRE: 134; DURIN peak, mostly 140s; POST: 137)  SpO2:  (pre 98%, post 98%)  BP:  (pre 103/73 (82), post 96/74 (83))  Pain Assessment:  Pain Assessment  Pain Assessment: 0-10  Pain Score: 0 - No pain     Objective   Activities of Daily Living: Grooming  Grooming  Comments: SBA in standing to clean hands post toileting    LE Dressing  LE Dressing: Yes  LE Dressing Comments: mod A for managing pants for toileting    Toileting  Toileting Comments: mod A for hygiene in standing at FWW at toilet     Bed Mobility/Transfers: Bed Mobility  Bed Mobility: Yes  Bed Mobility 1  Bed Mobility 1: Supine to sitting  Level of Assistance 1: Contact guard, Minimal verbal cues  Bed Mobility Comments 1: HOB elevated    Transfers  Transfer: Yes  Transfer 1  Transfer From 1: Sit to  Transfer to 1: Stand  Technique 1: Sit to stand  Transfer Device 1: Walker  Transfer Level of Assistance 1: Contact guard, Minimal verbal cues  Transfers 2  Transfer From 2: Stand to  Transfer to 2: Sit  Technique 2: Stand to sit  Transfer Device 2: Walker  Transfer Level of Assistance 2: Contact guard, Minimal verbal cues  Trials/Comments 2: functional mobility household distances with FWW and CGA    Toilet Transfers  Toilet Transfers Comments: min A x2 for toilet transfer in bathroom with cue for use of grab bar + FWW    Outcome Measures:  [facility] Daily Activity  Putting on and taking off regular lower body clothing: A lot  Bathing (including washing, rinsing, drying): A little  Putting on and taking off regular upper body clothing: A little  Toileting, which includes using toilet, bedpan or urinal: A lot  Taking care of personal grooming such as brushing teeth: A little  Eating Meals: None  Daily Activity - Total Score: 17     and E = Exercise and Early Mobility  Current Activity: Ambulating in [place]    Education Documentation  Precautions, taught by July Araujo OT at 3/11/2024  3:58 PM.  Learner: Patient  Readiness: Acceptance  Method: Explanation  Response: Verbalizes Understanding    ADL Training, taught by July Araujo OT at 3/11/2024  3:58 PM.  Learner: Patient  Readiness: Acceptance  Method: Explanation  Response: Verbalizes Understanding    Education Comments  No comments found.      Goals:  Encounter  Problems       Encounter Problems (Active)       ADLs       Patient will complete daily grooming tasks in standing with LRAD with supervision level of assistance and PRN adaptive equipment. (Progressing)       Start:  03/01/24    Expected End:  03/18/24               ADLs       Patient with complete lower body dressing with stand by assist level of assistance donning and doffing all LE clothes  with PRN adaptive equipment (Progressing)       Start:  03/04/24    Expected End:  03/18/24            Patient will complete toileting including hygiene clothing management/hygiene with stand by assist level of assistance. (Progressing)       Start:  03/04/24    Expected End:  03/18/24               BALANCE       Pt will increase dynamic standing tolerance to >10 min with supervision using LRAD during functional mobility/ADLs without LOB in order to improve activity tolerance and balance for self-care tasks.  (Progressing)       Start:  03/01/24    Expected End:  03/18/24               COGNITION/SAFETY       Patient will participate in cognitive activities to demonstrate WFL score on further cognitive assessments, including Medi-Cog, MoCA and remain A&O x3, CAM (-).  (Progressing)       Start:  03/01/24    Expected End:  03/18/24               EXERCISE/STRENGTHENING       Patient will be educated on BUE HEP for increased ADL/IADL performance. (Progressing)       Start:  03/01/24    Expected End:  03/18/24               MOBILITY       Patient will perform Functional mobility max Household distances/Community Distances with supervision level of assistance and least restrictive device in order to improve safety and functional mobility. (Progressing)       Start:  03/01/24    Expected End:  03/18/24               July Araujo OTR/L  Inpatient Occupational Therapist   Rehab Office: [phone]

## 2024-03-11 NOTE — PROGRESS NOTES
Physical Therapy    Physical Therapy Treatment    Patient Name: Charla Bowles  MRN: [id number]  Today's Date: 3/11/2024  Time Calculation  Start Time: 1421  Stop Time: 1501  Time Calculation (min): 40 min     Assessment/Plan   PT Assessment  End of Session Communication: Bedside nurse  End of Session Patient Position: Up in chair, Alarm off, not on at start of session  PT Plan  Inpatient/Swing Bed or Outpatient: Inpatient  PT Plan  Treatment/Interventions: Bed mobility, Transfer training, Gait training, Stair training, Balance training, Strengthening, Endurance training, Therapeutic exercise, Therapeutic activity  PT Plan: Skilled PT  PT Frequency: 5 times per week  PT Discharge Recommendations: High intensity level of continued care  PT Recommended Transfer Status: Assist x1  PT - OK to Discharge: Yes    General Visit Information:   PT  Visit  PT Received On: 24  General  Family/Caregiver Present: No  Co-Treatment: OT  Co-Treatment Reason: to maximize mobility safely 2/2 axillary Impella and loading of Benadryl  Prior to Session Communication: Bedside nurse  Patient Position Received: Bed, 3 rail up, Alarm off, not on at start of session  General Comment: Pt supine in bed upon arrival. Pleasant and motivated for therapy. R axillary Impella (P-5, purge flow 10/9 ml/hr)- dressings secured but blood in dressing, RN aware. Milrinone .25 mcg.    Subjective     Precautions:  Precautions  Medical Precautions: Cardiac precautions, Fall precautions  Precautions Comment: R Axillary Impella precautions- no R humeral FLEX/ABD >90 degrees, no R UE humeral EXT past plane of body, No Pushing/pulling/lifting with R UE.    Vital Signs:  Vital Signs  Heart Rate:  (PRE: 134; DURIN peak, mostly 140s; POST: 137)  SpO2:  (PRE: 100%; POST: 96%)  BP:  (PRE: 103/73, POST: 96/74)    Objective     Pain:  Pain Assessment  Pain Assessment: 0-10  Pain Score: 0 - No pain    Cognition:  Cognition  Overall Cognitive Status:  Within Functional Limits  Arousal/Alertness: Appropriate responses to stimuli  Orientation Level: Oriented X4  Following Commands: Follows all commands and directions without difficulty    Activity Tolerance:  Activity Tolerance  Early Mobility/Exercise Safety Screen: Proceed with mobilization - No exclusion criteria met    Treatments:  Therapeutic Exercise  Therapeutic Exercise Performed: Yes  Therapeutic Exercise Activity 1: Pt performed 2 setrs of 5 reps of sit<->stand trials with no UE assist with 1 minute rest break in between trials.    Bed Mobility  Bed Mobility: Yes  Bed Mobility 1  Bed Mobility 1: Supine to sitting  Level of Assistance 1: Contact guard  Bed Mobility Comments 1: HOB elevated    Ambulation/Gait Training  Ambulation/Gait Training Performed: Yes  Ambulation/Gait Training 1  Surface 1: Level tile  Device 1: Rolling walker  Assistance 1: Contact guard (cues for posture correction)  Quality of Gait 1: Shuffling gait  Comments/Distance (ft) 1: 100 ft with one standing rest break ~30 seconds for SOB  Transfers  Transfer: Yes  Transfer 1  Transfer From 1: Sit to  Transfer to 1: Stand  Technique 1: Sit to stand  Transfer Device 1: Walker  Transfer Level of Assistance 1: Contact guard  Transfers 2  Transfer From 2: Stand to  Transfer to 2: Sit  Technique 2: Stand to sit  Transfer Device 2: Walker  Transfer Level of Assistance 2: Contact guard  Trials/Comments 2: x2 trials; decreased eccentric control  Transfers 3  Transfer From 3: Sit to  Transfer to 3: Stand  Technique 3: Sit to stand  Transfer Device 3: Walker  Transfer Level of Assistance 3: Minimum assistance (x2 person; cues for sequencing/hand placement)  Trials/Comments 3: from low surface height    Outcome Measures:  [facility] Basic Mobility  Turning from your back to your side while in a flat bed without using bedrails: None  Moving from lying on your back to sitting on the side of a flat bed without using bedrails: A little  Moving to and from  bed to chair (including a wheelchair): A little  Standing up from a chair using your arms (e.g. wheelchair or bedside chair): A little  To walk in hospital room: A little  Climbing 3-5 steps with railing: A little  Basic Mobility - Total Score: 19    Confusion Assessment Method-ICU (CAM-ICU)  Feature 1: Acute Onset or Fluctuating Course: Negative  Overall CAM-ICU: Negative    FSS-ICU  Ambulation: Walks >/ or equal to 50 feet with any assistance x1  Rolling: Supervision or set-up only  Sitting: Supervision or set-up only  Transfer Sit-to-Stand: Minimal assistance (performs 75% or more of task)  Transfer Supine-to-Sit: Minimal assistance (performs 75% or more of task)  Total Score: 20    ICU Mobility Screen  Early Mobility/Exercise Safety Screen: Proceed with mobilization - No exclusion criteria met  E = Exercise and Early Mobility  Early Mobility/Exercise Safety Screen: Proceed with mobilization - No exclusion criteria met  Current Activity: Ambulating in [place]  Other Measures  5x Sit to Stand: Pt completed 5XSTS from recliner chair without UE assist: 12.47 seconds.    Education Documentation  Mobility Training, taught by Melani Mccauley PT at 3/11/2024  4:17 PM.  Learner: Patient  Readiness: Acceptance  Method: Explanation  Response: Needs Reinforcement    Education Comments  No comments found.      Encounter Problems       Encounter Problems (Active)       Balance       Pt will demonstrate improved sitting/standing static/dynamic balance activities without LOB for increase in safety prior to DC.  (Progressing)       Start:  03/01/24    Expected End:  03/15/24               Mobility       Pt will ambulate 200ft with appropriate form, CGA or less, LRAD, and no LOB for safe DC.  (Progressing)       Start:  03/01/24    Expected End:  03/15/24            Pt will tolerate >15 minutes of upright standing activity without seated rest breaks with no changes in vital signs for improved functional mobility.  (Progressing)        Start:  03/01/24    Expected End:  03/15/24            Pt will ascend/descend 3 steps with HR with CGA or less in order to safely access her home upon DC.  (Progressing)       Start:  03/01/24    Expected End:  03/15/24               Transfers       Pt will perform bed mobility and sit<>stand transfers with CGA or less and use of LRAD to safety DC.  (Progressing)       Start:  03/01/24    Expected End:  03/15/24                 Signed by Melani Mccauley DPT

## 2024-03-11 NOTE — POST-PROCEDURE NOTE
This is a 34 y.o. female with antibody-mediated rejection of heart graft  who underwent therapeutic plasma exchange (TPE) with 100% plasma as replacement fluid.     I saw and evaluated the patient during the TPE.  The patient was resting in bed.  The vascular access functioned well.     Pre-procedure labs:  WBC   Date/Time Value Ref Range Status   03/11/2024 12:14 AM 8.8 4.4 - 11.3 x10*3/uL Final     Hemoglobin   Date/Time Value Ref Range Status   03/11/2024 12:14 AM 7.5 (L) 12.0 - 16.0 g/dL Final     Hematocrit   Date/Time Value Ref Range Status   03/11/2024 12:14 AM 21.3 (L) 36.0 - 46.0 % Final     Platelets   Date/Time Value Ref Range Status   03/11/2024 12:14  (L) 150 - 450 x10*3/uL Final        POCT Calcium, Ionized   Date/Time Value Ref Range Status   03/11/2024 06:01 AM 1.05 (L) 1.1 - 1.33 mmol/L Final     Comment:     The performance characteristics of ionized calcium tested  in heparinized plasma or serum have been validated by the  individual  laboratory site where testing is performed.   Testing on heparinized plasma or serum is not approved by   the FDA; however, such approval is not necessary.        Protime   Date/Time Value Ref Range Status   03/11/2024 12:14 AM 12.5 9.8 - 12.8 seconds Final     INR   Date/Time Value Ref Range Status   03/11/2024 12:14 AM 1.1 0.9 - 1.1 Final     aPTT   Date/Time Value Ref Range Status   03/11/2024 12:14 AM 20 (L) 27 - 38 seconds Final     Fibrinogen   Date/Time Value Ref Range Status   03/11/2024 12:14  200 - 400 mg/dL Final        Pre-procedure vital signs at 1025:  T: 36.8 C, HR: 129, RR: 32, /86  Pulse oximetry: 98% on room air    Post-procedure vital signs at 1310:  T: 36.9 C, HR: 132, RR: 28, BP: 117/89       Outcome: TPE completed.   Adverse Reaction: No    Assessment and Plan:  34 y.o. female with antibody-mediated rejection of heart graft  who underwent TPE #4/5.  Draw post-procedure labs.  Vascular access to be managed by clinical  team.  Next TPE #5 is schedule for 3/12/24.

## 2024-03-11 NOTE — PROGRESS NOTES
[facility]  HFICU PROGRESS NOTE    Charla Bowles/[id number]    Admit Date: 2/15/2024  Hospital Length of Stay: 25   ICU Length of Stay: 22h   Primary Service: HFICU  Primary HF Cardiologist: Dr Cornell  Referring: Dr Cornell     INTERVAL EVENTS / PERTINENT ROS:     No acute events overnight. Patient transferred from CTICU this weekend.     Plan:  PLEX and IVIG today   ECHO  Cath lab for swan  Presentation tomorrow for committee    MEDICATIONS  Infusions:  lactated Ringer's, Last Rate: 5 mL/hr (03/09/24 2300)  lactated Ringer's, Last Rate: Stopped (03/03/24 1200)  milrinone, Last Rate: 0.25 mcg/kg/min (03/11/24 1400)  sodium bicarbonate infusion Impella Purge 25 mEq/1000 mL D5W, Last Rate: 10 mL/hr (03/10/24 1510)      Scheduled:  aspirin, 81 mg, Daily  bisacodyl, 10 mg, Once  calcitriol, 0.5 mcg, Daily  calcium gluconate, 2 g, Once  darbepoetin floyd, 100 mcg, q14 days  ferrous sulfate (325 mg ferrous sulfate), 65 mg of iron, Daily with breakfast  heparin (porcine), 5,000 Units, q8h  hydrALAZINE, 100 mg, q8h  insulin lispro, 0-10 Units, Before meals & nightly  isosorbide dinitrate, 40 mg, q8h  [START ON 3/12/2024] levothyroxine, 200 mcg, Daily  melatonin, 10 mg, Nightly  multivitamin with minerals, 1 tablet, Daily  nystatin, 5 mL, q6h  pantoprazole, 40 mg, Daily before breakfast  perflutren lipid microspheres, 0.5-10 mL of dilution, Once in imaging  perflutren protein A microsphere, 0.5 mL, Once in imaging  polyethylene glycol, 17 g, BID  predniSONE, 10 mg, Daily  rosuvastatin, 40 mg, Nightly  sennosides-docusate sodium, 2 tablet, BID  sirolimus, 3 mg, Daily  sodium chloride, 5 spray, 4x daily  [Held by provider] sulfamethoxazole-trimethoprim, 80 mg of trimethoprim, Daily  sulfur hexafluoride microsphr, 2 mL, Once in imaging  tacrolimus, 2 mg, q12h TRICIA  traZODone, 50 mg, Nightly  valGANciclovir, 450 mg, q48h      PRN:  acetaminophen, 975 mg, q8h PRN  bisacodyl, 10 mg, Daily  "PRN  dextrose, 25 g, q15 min PRN  dextrose, 25 g, q15 min PRN   Or  glucagon, 1 mg, q15 min PRN  glucagon, 1 mg, q15 min PRN  artificial tears, 2 drop, PRN  microfibrllar collagen, , PRN  mupirocin, , BID PRN  ondansetron, 4 mg, q4h PRN  oxyCODONE, 2.5 mg, q4h PRN  sodium chloride, 1 spray, 4x daily PRN      Invasive Hemodynamics:    Most Recent Range Past 24hrs   BP (Art) 133/102 No data recorded   MAP(Art) 110 mmHg No data recorded   RA/CVP   No data recorded   PA 27/19 No data recorded   PA(mean) 22 mmHg No data recorded   PCWP  (Unable to obtain, Provider aware) No data recorded   CO 4.9 L/min No data recorded   CI 2.5 L/min/m2 No data recorded   Mixed Venous 63 % No data recorded   SVR  1217 (dyne*sec)/cm5 No data recorded   PVR 42 (dyne*sec)/cm5 No data recorded       Impella:      Most Recent Range Past 24hrs   Performance Level 5 P Level  Min: 5   Min taken time: 03/11/24 1400  Max: 7   Max taken time: 03/11/24 1000   Flow (L/min) 2.9 Flow (L/min)  Min: 2.9   Min taken time: 03/11/24 1400  Max: 4.3   Max taken time: 03/11/24 0300   Motor Current 274/200 Motor Current  Min: 274/200   Min taken time: 03/11/24 1400  Max: 414/330   Max taken time: 03/11/24 0300   Placement Signal Yes  Placement OK could not be evaluated. This SmartLink does not work with rows of the type: Custom List   Purge (mmHg) 550 Purge Pressure (mmHg)  Min: 452   Min taken time: 03/11/24 0500  Max: 573   Max taken time: 03/10/24 2300   Purge rate (mL/hr) 11 Purge Rate (mL/hr)  Min: 10.4   Min taken time: 03/11/24 0200  Max: 11.4   Max taken time: 03/11/24 0800       PHYSICAL EXAM:   Visit Vitals  /73   Pulse (!) 134   Temp 36.7 °C (98.1 °F) (Temporal)   Resp (!) 36   Ht 1.549 m (5' 0.98\")   Wt 97 kg (213 lb 13.5 oz)   SpO2 97%   BMI 40.43 kg/m²   OB Status Hysterectomy   Smoking Status Never   BSA 2.04 m²       Wt Readings from Last 5 Encounters:   03/11/24 97 kg (213 lb 13.5 oz)   12/07/23 92.1 kg (203 lb)   12/01/23 93 kg (205 lb) "   11/29/23 92.9 kg (204 lb 12.8 oz)   11/09/23 91.3 kg (201 lb 3.2 oz)       INTAKE/OUTPUT:  I/O last 3 completed shifts:  In: 570.9 (5.7 mL/kg) [I.V.:260.9 (2.6 mL/kg); Blood:310]  Out: 275 (2.8 mL/kg) [Urine:50 (0 mL/kg/hr); Emesis/NG output:225]  Dosing Weight: 99.6 kg      Physical Exam  Constitutional:       General: She is not in acute distress.     Appearance: Normal appearance. She is ill-appearing.   HENT:      Head: Normocephalic.      Mouth/Throat:      Mouth: Mucous membranes are moist.   Eyes:      Pupils: Pupils are equal, round, and reactive to light.   Neck:      Comments: RIJ dialysis line present   Cardiovascular:      Rate and Rhythm: Regular rhythm. Tachycardia present.      Pulses: Normal pulses.      Heart sounds: Normal heart sounds.   Pulmonary:      Effort: Pulmonary effort is normal. No respiratory distress.      Breath sounds: Normal breath sounds.   Abdominal:      General: Bowel sounds are normal.      Palpations: Abdomen is soft.   Musculoskeletal:         General: Normal range of motion.      Cervical back: Normal range of motion.      Right lower leg: Edema present.      Left lower leg: Edema present.   Skin:     General: Skin is warm.      Capillary Refill: Capillary refill takes less than 2 seconds.   Neurological:      General: No focal deficit present.      Mental Status: She is alert and oriented to person, place, and time.         DATA:  CMP:  Results from last 7 days   Lab Units 03/11/24  0014 03/10/24  1214 03/10/24  0552 03/09/24  1641 03/09/24  0555 03/08/24  1247 03/08/24  0549 03/07/24  1402 03/07/24  0541 03/06/24  1203 03/06/24  0110 03/05/24  1139 03/05/24  0308   SODIUM mmol/L 130* 132* 132* 135* 132* 136  --  133* 133* 132* 134* 127* 128*   POTASSIUM mmol/L 3.7 4.1 3.7 3.9 3.7 4.0  --  4.4 4.6 4.4 4.3 4.7 4.8   CHLORIDE mmol/L 91* 91* 93* 95* 91* 93*  --  91* 92* 92* 94* 89* 90*   CO2 mmol/L 25 25 27 28 29 29  --  27 25 26 24 19* 19*   ANION GAP mmol/L 18 20 16 16 16  18  --  19 21* 18 20 24* 24*   BUN mg/dL 31* 28* 25* 19 47* 40*  --  79* 80* 67* 56* 121* 118*   CREATININE mg/dL 4.57* 3.61* 3.40* 2.41* 4.48* 3.55*  --  5.17* 4.98* 3.83* 3.01* 5.21* 5.03*   EGFR mL/min/1.73m*2 12* 16* 18* 27* 13* 17*  --  11* 11* 15* 20* 11* 11*   MAGNESIUM mg/dL  --  2.23 1.96 1.96 1.95 1.97  --  2.26 2.33 2.27 2.09 2.47* 2.42*   ALBUMIN g/dL 3.8 4.1 3.7 4.0 3.5 3.3* 3.6 3.3* 3.6 3.8 3.7 4.2 4.3   ALT U/L 9  --  9  --  7  --  9  --  6*  --  8  --  5*   AST U/L 44*  --  45*  --  36  --  38  --  35  --  33  --  31   BILIRUBIN TOTAL mg/dL 0.9  --  0.9  --  0.7  --  0.8  --  0.7  --  1.0  --  0.9     CBC:  Results from last 7 days   Lab Units 03/11/24  0014 03/10/24  1214 03/10/24  0552 03/09/24  1641 03/09/24  0555 03/08/24  1247 03/08/24  1236 03/08/24  0549   WBC AUTO x10*3/uL 8.8 10.4 8.8 9.4 8.1 9.8 9.8 9.3   HEMOGLOBIN g/dL 7.5* 8.1* 6.7* 7.2* 6.7* 7.1* 7.3* 7.1*   HEMATOCRIT % 21.3* 23.4* 19.9* 20.8* 19.3* 21.7* 22.1* 20.7*   PLATELETS AUTO x10*3/uL 132* 127* 118* 126* 160 148* 146* 147*   MCV fL 85 88 89 87 87 91 90 85     COAG:   Results from last 7 days   Lab Units 03/11/24  0014 03/10/24  0552 03/09/24  1641 03/09/24  0555 03/08/24  1247 03/08/24  0549 03/07/24  1402 03/07/24  0541   INR  1.1 1.2* 1.1 1.2* 1.3* 1.3* 1.3* 1.3*     ABO:   ABO TYPE   Date Value Ref Range Status   03/11/2024 O  Final     HEME/ENDO:  Results from last 7 days   Lab Units 03/07/24  1402   TSH mIU/L 14.87*   HEMOGLOBIN A1C % 5.7*      CARDIAC:   Results from last 7 days   Lab Units 03/11/24  0014 03/10/24  0552 03/09/24  0555 03/08/24  0549 03/07/24  1402 03/07/24  0541 03/06/24  0110 03/05/24  0308   LD U/L 571* 557* 420* 496*  --  566* 446* 594*   BNP pg/mL  --   --   --   --  4,683*  --   --   --        ASSESSMENT AND PLAN:   33 year old female with past medical history of heart transplant in March 2022 with postoperative course complicated by upper extremity/internal jugular DVTs, and asymptomatic 2R  rejection in November 2022. She was admitted to HFICU on 2/15 with concern for cardiogenic shock secondary to allograft rejection and decompensated heart failure with multiorgan dysfunction including significant elevation of liver enzymes and nonoliguric acute kidney injury. Endomyocardial biopsy on 2/16 revealed mild ACR with +CD4s and negative HLAs, however multisystem organ failure persisted with increased inotropic requirements. IABP placed on 2/18. Transferred to CTICU on 2/19 for [facility] ECMO cannulation with Dr. Marina for persistent multi-organ system dysfunction. Intubated 2/21 for respiratory failure 2/2 pulmonary edema, extubated 2/24. ECMO de-cannulated 2/29/24 but had worsening HIRAM and overall declined clinical status and IABP was transitioned to R axillary impella 5.5 on 3/1. Now tolerating iHD with plan for completion of PLEX/IVIG this Wednesday. Transferred from CTICU to HFICU on 3/10 for further management.    NEURO:   #No acute issues   - Serial neuro and pain assessments  - PRN Tylenol 975 mg Q8 PRN  - Continue oxy to 2.5mg Q4 PRN.   - PT/OT Consult  - Continue scheduled melatonin 10 mg nightly and Trazodone 50 mg; PRN for insomnia  - CAM ICU score qshift. Sleep/wake cycle hygiene     ENT:  #Epistaxis  - Significant epistaxis 2/28 and 3/3 requiring ENT consult, packing removed by ENT 3/5  - No current evidence of epistaxis.  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)  Toxo: -/-   Hep C: -/-     Rejection/Prophylaxis (transplants):  - Steroids: Continue 10mg PO prednisone daily  - Tacrolimus: 2.0 mg PO @ 0630 & 2.0 mg PO @ 1830 w/ daily levels drawn @ 0600  - Sirolimus: 3mg PO daily w/ daily levels drawn at 0600 (last increase 3/10)  - Tacrolimus goal troughs: 3-5 Daily level 3/11: 3.3  - Sirolimus goal troughs: 7-9 Daily level  3/11: 6.7  - Combined sirolimus/tacrolimus goal of 10-12  - Antifungals: nystatin oral suspension 5 mls Q6  - Antivirals: Valcyte 450mg Q48hrs due to low grade viremia   - Anti PCP & Toxoplasmosis: Bactrim SS daily --> Holding due to thrombocytopenia (2/23)     Last cardiac biopsy: 2/16/24 with ACR1 and no AMR  Last HLA: 2/16/24 negative for DSAs   Last RHC: 2/16/24 w/ RA 11, PAP 34/14(23), W 19 and C.I. 2.3  Last LHC: 2/18/24 negative for CAV and CAD   Last TTE/BOB: 3/1/24 shows LVEF to 30% and mild RV dysfunction (intra-op impella 5.5 insert)  Osteopenia/osteoporosis prophylaxis: Vitamin D3 and calcium supplements  Peptic/gastric ulcer prophylaxis: Pantoprazole 40mg daily   CAV Prophylaxis: Aspirin 81mg daily & pravastatin daily     - Unclear cause of acute severe graft dysfunction. Most likely smoldering ACR vs. AMR; however, 2xallograft biopsies on 2/16 & 2/20 remain negative for any significant pathology. Notably, both biopsies taken after rejection therapies implemented which may have reduced areas of graft damage.    - DSAs remain negative; however, patient may have non-HLA antibodies present. 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   - Negative CAV & CAD via LHC on 2/18/24   - Completed methylpred steroid pulse w/ 1g Q24 x 3 days (2/16-2/18) and prednisone taper   - Thymoglobulin doses: 2/18 & 2/19   - IVIG + PLEX Session: 2/18, 2/20, 3/7, 3/11, 3/13  - Graft function slightly worse after transition to impella 5.5 on 3/1. IABP removed 3/1/24.    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  #Impella 5.5 support 3/1/24  - Maintain goal MAP 70-90  - Continue slow wean of impella support as clinically indicated with evidence of adequate end organ perfusion  - Continue Impella P level @ 5, wean per clinical picture / hemodynamics   - C/w Sodium bicarb purge for Impella   - Continue milrinone  0.25mcg/kg/min  - Continue afterload reduction with PO hydralazine 100mg q8h, PO isosorbide 40mg every 8 hours  - Hold home amlodipine  - Continue ASA and Crestor daily   - Cath lab order for SGC 3/11 - unable to do d/t multiple clots     #Advanced therapy evaluation  -Plan for discussion at committee tomorrow 3/11/2024  - Dentistry wants facial CT to reassess  - Discuss with team what parts of her evaluation needs to be completed      PULM:   #Acute respiratory failure 2/21-2/24 (resolved)   - Remains on RA   - CXR as needed  - Maintain SPO2 > 92%     GI:   #History of gastric bypass   #History of MMF colitis  #Acute transaminitis - Resolved   - Continue home PPI, calcitriol 0.5mg daily, multivitamin, calcium carbonate 1,250mg BID  - Continue renal diet  - Continue miralax BID & senna-colace BID  - Last BM 3/10     :   #Acute renal failure 2/2 to cardiorenal syndrome   #IHD T/Th/Sa  - Baseline Cr 1.2-1.3.   - RFP daily and PRN  - Bladder scan daily  - CVVH stopped on 2/27/24  - Transplant nephrology following closely for IHD schedule     ENDO:   #T2DM  - Steroid induced hyperglycemia acceptable glycemic control on SSI.   - Maintain BG <180 with hypoglycemia protocol  - Continue SSI     #Hypothyroidism  - TSH 14.87, T4 1.37, T3 60  - Decreased Synthroid to 200 mcg daily on 3/11, redraw TSH on 3/11 (ordered)  - Endocrine following      HEME:   #Acute DVT L IJ vein and SVT left cephalic vein and right cephalic vein   #Iron defecency anemia  #Acute blood loss anemia   #Multiple transfusions   - CBC daily and PRN   - Continue ferrous sulfate daily  - SQH only per discussion with Dr. Bright 3/7 with new findings of DVT  - Continue SCDs for DVT prophylaxis  - Continue Aranesp every 2 weeks  - Last type and screen: 3/8, update tonight 3/12     ID:   #No acute issues  - Received Zosyn and Vancomycin on 2/15 in ED  - Blood cultures from 2/15 negative  - Trend temp q4h  - Afebrile and nontoxic     PHYSICAL AND  OCCUPATIONAL THERAPY: ordered and following     LINES:  PIVs  RIJ trialysis catheter 3/5  R Axillary Impella 3/1    DVT: SCDs, subcutaneous heparin   VAP BUNDLE: N/A  ULCER PPX: PPI  GLYCEMIC CONTROL: SSI  BOWEL CARE: Patti-colace BID, miralax BID  INDWELLING CATHETER: N/A  NUTRITION: Adult diet Renal; Potassium Restricted 2 gm (50mEq); 2 - 3 grams Sodium      EMERGENCY CONTACT: Extended Emergency Contact Information  Primary Emergency Contact: SAHIL DIMAS  Address: [address] [place] [address]  Home Phone: [phone]  Mobile Phone: [phone]  Relation: Mother  FAMILY UPDATE: given at bedside  CODE STATUS: Full Code  DISPO: remain in HFICU    Patient seen and assessed with Dr. Melo     _________________________________________________  JIMMY Musa-CNP

## 2024-03-11 NOTE — CARE PLAN
Problem: Pain  Goal: Takes deep breaths with improved pain control throughout the shift  Outcome: Progressing  Goal: Turns in bed with improved pain control throughout the shift  Outcome: Progressing     Problem: Skin  Goal: Decreased wound size/increased tissue granulation at next dressing change  Outcome: Progressing  Goal: Participates in plan/prevention/treatment measures  Outcome: Progressing     Problem: Discharge Planning  Goal: Discharge to home or other facility with appropriate resources  Outcome: Progressing     Problem: Chronic Conditions and Co-morbidities  Goal: Patient's chronic conditions and co-morbidity symptoms are monitored and maintained or improved  Outcome: Progressing     Problem: Fall/Injury  Goal: Not fall by end of shift  Outcome: Progressing  Goal: Be free from injury by end of the shift  Outcome: Progressing  Goal: Verbalize understanding of personal risk factors for fall in the hospital  Outcome: Progressing  Goal: Verbalize understanding of risk factor reduction measures to prevent injury from fall in the home  Outcome: Progressing  Goal: Use assistive devices by end of the shift  Outcome: Progressing  Goal: Pace activities to prevent fatigue by end of the shift  Outcome: Progressing   The patient's goals for the shift include remain free from nausea and vomiting this shift.    The clinical goals for the shift include remain hemodynamically stable throughout shift.    Over the shift, the patient did not make progress toward the following goals. Barriers to progression include acuteness of illness. Recommendations to address these barriers include involve patient in POC

## 2024-03-11 NOTE — PROGRESS NOTES
SW intern met with team for updates. Patient to be presented for dual transplant on 3/12 for heart and kidney. Patient to get echo today. Currently recommended for high intensity PT. SW will continue to follow.     ANGELA Stroud

## 2024-03-11 NOTE — NURSING NOTE
Apheresis Nursing Note    Charla Bowles is a 34 y.o. female presenting for TPE for cardiac AMR.    Pre-Procedure Assessment  Pre-Procedure Assessment  Level of Consciousness: Alert  Orientation Level: Oriented X4  Cognition: Appropriate judgement, Appropriate safety awareness  Pain Score: 0 - No pain  Access Sites 'Okay to Use' Obtained: Yes  Access Site(s) Assessment: Yes  Estimated Replacement Volume: 3050  Vital Signs  Temp: 36.8  Heart Rate: 129  Resp: 32  BP: 115/86  SpO2: 98 %  Pulse Oximetry Type: Continuous  Medical Gas Therapy: None (Room air)  Procedure Information and Time Out  Procedure Type: Therapeutic plasma exchange  Plasma Diagnosis: Antibody-mediated rejection, cardiac  TPE Procedure Number: 4  Volume Processed (L): 1.0  Fluid Balance: 95%  Albumin : Plasma Ratio: 0:100  TPE Time-Out Completed: Yes  Provider Time Out Completed with: Dr Chen  Time Out Completed on: 03/11/24  Time Out Completed at: 1030  Equipment and Disposables  Apheresis Machine: Spectra Optia [id number]  Apheresis Machine PM in Date: Yes  Blood Warmer: Astotherm DNA [id number]  Blood Warmer PM in Date: Yes  Kit and Blood/Fluid Warmer Inspection: Tubing inspected with machine prime, Tubing inspected prior to connection to patient  Kit Type: Exchange kits  Kit Lot Number: [id number]  Kit Expiration Date: 12/01/25  ADC-A Used as Anticoagulant: Yes  ACD-A Lot #: [id number]  ACD-A Expiration Date: 10/01/25  9% Sodium Chloride Lot # (Liter): [id number]  9% Sodium Chloride Expiration Date (Liter): 05/31/25  Sodium Chloride Volume: 100 mL  9% Sodium Chloride Lot #: [id number]  9% Sodium Chloride Expiration Date: 06/30/25  Procedure Start  Start Time: 1055    Post-Procedure Assessment:  Post-Procedure  End Time: 1310  Waste Materials Collected for Research: No  Procedure Outcome: Treatment completed successfully  Adverse Event: No  Post-Procedure Line Assessment: Patent  Post-Procedure Access Care : Loaded/flushed per order, Caps changed, 'Do  Not Flush' label applied  Vital Signs  Temp: 36.9  Heart Rate: 132  Resp: 28  BP: 117/89  SpO2: 99 %  Pulse Oximetry Type: Continuous  Medical Gas Therapy: None (Room air)  Post-Procedure Patient Fluid Values   Replacement Fluid Intake (mL): 3150 mL  AC Infused (mL): 64 mL  Rinseback Intake (mL): 155 mL  Ca+ Solution Intake (mL): 297 mL  Other Intake (mL): 0 mL  Apheresis Intake Total (mL): 3666 mL  Removed During Apheresis Output (mL): 4089 mL  AC in Bag Output (mL): 501 mL  Samples Output (mL): 15 mL  Apheresis Output Total (mL): 3603 mL  Inlet Volume Processed (mL): 5649 mL  Net Difference (mL): 63 mL  Disposition  Patient's Condition at End of Procedure: Stable  Disposition: N/A - procedure performed at bedside  Transferred via:  (N/A PLEX at bedside)  Report Given to: Floor Nurse  $ Apheresis Charge: Therapeutic plasma exchange    Raman Martin RN

## 2024-03-11 NOTE — PROGRESS NOTES
Music Therapy Note    Charla Bowles     Therapy Session  Referral Type: New referral this admission  Visit Type: Follow-up visit  Session Start Time: 1533  Conflict of Service: Off unit               Treatment/Interventions            Narrative  Assessment Detail: MT and ATR attempted session but pt off unit. MT to f/u at a later time    Education Documentation  No documentation found.

## 2024-03-12 PROBLEM — I50.9 ACUTE DECOMPENSATED HEART FAILURE (MULTI): Status: ACTIVE | Noted: 2024-01-01

## 2024-03-12 NOTE — H&P
Charla MONTELONGO Yimi Bowles/[id number]     Admit Date: 2/15/2024  Hospital Length of Stay: 26   ICU Length of Stay: 1d 21h   Primary Service: HFICU  Primary HF Cardiologist: Dr Cornell  Referring: Dr Cornell      INTERVAL EVENTS / PERTINENT ROS:      No acute events overnight. Patient transferred from [facility]ICU this weekend.      Plan:  PLEX and IVIG today   ECHO  Cath lab for swan  Presentation tomorrow for committee     MEDICATIONS  Infusions:  lactated Ringer's, Last Rate: Stopped (03/03/24 1200)  milrinone, Last Rate: 0.25 mcg/kg/min (03/12/24 1225)  sodium bicarbonate infusion Impella Purge 25 mEq/1000 mL D5W, Last Rate: 10 mL/hr (03/12/24 0854)        Scheduled:  aspirin, 81 mg, Daily  bisacodyl, 10 mg, Once  calcitriol, 0.5 mcg, Daily  calcium gluconate, 2 g, Once  darbepoetin floyd, 100 mcg, q14 days  ferrous sulfate (325 mg ferrous sulfate), 65 mg of iron, Daily with breakfast  heparin (porcine), 5,000 Units, q8h  hydrALAZINE, 100 mg, q8h  insulin lispro, 0-10 Units, Before meals & nightly  isosorbide dinitrate, 40 mg, q8h  levothyroxine, 200 mcg, Daily  melatonin, 10 mg, Nightly  multivitamin with minerals, 1 tablet, Daily  nystatin, 5 mL, q6h  pantoprazole, 40 mg, Daily before breakfast  perflutren lipid microspheres, 0.5-10 mL of dilution, Once in imaging  perflutren protein A microsphere, 0.5 mL, Once in imaging  polyethylene glycol, 17 g, BID  predniSONE, 10 mg, Daily  rosuvastatin, 40 mg, Nightly  sennosides-docusate sodium, 2 tablet, BID  sirolimus, 3 mg, Daily  sodium chloride, 5 spray, 4x daily  [Held by provider] sulfamethoxazole-trimethoprim, 80 mg of trimethoprim, Daily  sulfur hexafluoride microsphr, 2 mL, Once in imaging  tacrolimus, 2 mg, q12h TRICIA  traZODone, 50 mg, Nightly  valGANciclovir, 450 mg, q48h        PRN:  acetaminophen, 975 mg, q8h PRN  bisacodyl, 10 mg, Daily PRN  dextrose, 25 g, q15 min PRN  dextrose, 25 g, q15 min PRN   Or  glucagon, 1 mg, q15 min PRN  glucagon, 1 mg, q15 min  "PRN  artificial tears, 2 drop, PRN  microfibrllar collagen, , PRN  mupirocin, , BID PRN  ondansetron, 4 mg, q4h PRN  oxyCODONE, 2.5 mg, q4h PRN  sodium chloride, 1 spray, 4x daily PRN        Invasive Hemodynamics:     Most Recent Range Past 24hrs   BP (Art) 133/102 No data recorded   MAP(Art) 110 mmHg No data recorded   RA/CVP  No data recorded   PA 27/19 No data recorded   PA(mean) 22 mmHg No data recorded   PCWP  (Unable to obtain, Provider aware) No data recorded   CO 4.9 L/min No data recorded   CI 2.5 L/min/m2 No data recorded   Mixed Venous 63 % No data recorded   SVR  1217 (dyne*sec)/cm5 No data recorded   PVR 42 (dyne*sec)/cm5 No data recorded         Impella:      Most Recent Range Past 24hrs   Performance Level 5 P Level  Min: 5   Min taken time: 03/12/24 1200  Max: 5   Max taken time: 03/12/24 1200   Flow (L/min) 2.9 Flow (L/min)  Min: 2.6   Min taken time: 03/11/24 1800  Max: 3.1   Max taken time: 03/12/24 1000   Motor Current 291/207 Motor Current  Min: 270/206   Min taken time: 03/12/24 0800  Max: 291/207   Max taken time: 03/12/24 1200   Placement Signal Yes  Placement OK could not be evaluated. This SmartLink does not work with rows of the type: Custom List   Purge (mmHg) 453 Purge Pressure (mmHg)  Min: 453   Min taken time: 03/12/24 1200  Max: 586   Max taken time: 03/12/24 1000   Purge rate (mL/hr) 11 Purge Rate (mL/hr)  Min: 10.5   Min taken time: 03/11/24 1600  Max: 11   Max taken time: 03/12/24 1200         PHYSICAL EXAM:   Visit Vitals  /69   Pulse (!) 139   Temp 36.4 °C (97.5 °F) (Skin)   Resp (!) 34   Ht 1.549 m (5' 0.98\")   Wt 97 kg (213 lb 13.5 oz)   SpO2 98%   BMI 40.43 kg/m²   OB Status Hysterectomy   Smoking Status Never   BSA 2.04 m²             Wt Readings from Last 5 Encounters:   03/11/24 97 kg (213 lb 13.5 oz)   12/07/23 92.1 kg (203 lb)   12/01/23 93 kg (205 lb)   11/29/23 92.9 kg (204 lb 12.8 oz)   11/09/23 91.3 kg (201 lb 3.2 oz)         INTAKE/OUTPUT:  I/O last 3 completed " shifts:  In: 4120 (41.4 mL/kg) [I.V.:254 (2.6 mL/kg); Other:3666; IV Piggyback:200]  Out: 3603 (36.2 mL/kg) [Other:3603]  Dosing Weight: 99.6 kg       Physical Exam  Constitutional:       General: She is not in acute distress.     Appearance: Normal appearance. She is not ill-appearing.   HENT:      Head: Normocephalic.      Mouth/Throat:      Mouth: Mucous membranes are moist.   Eyes:      Pupils: Pupils are equal, round, and reactive to light.   Neck:      Comments: RIJ dialysis line present   Cardiovascular:      Rate and Rhythm: Regular rhythm. Tachycardia present.      Pulses: Normal pulses.      Heart sounds: Normal heart sounds.   Pulmonary:      Effort: Pulmonary effort is normal. No respiratory distress.      Breath sounds: Normal breath sounds.   Abdominal:      General: Bowel sounds are normal.      Palpations: Abdomen is soft.   Musculoskeletal:         General: Normal range of motion.      Cervical back: Normal range of motion.      Right lower leg: Edema present.      Left lower leg: Edema present.   Skin:     General: Skin is warm.      Capillary Refill: Capillary refill takes less than 2 seconds.   Neurological:      General: No focal deficit present.      Mental Status: She is alert and oriented to person, place, and time.         DATA:  CMP:                  Results from last 7 days   Lab Units 03/12/24  0439 03/11/24  0014 03/10/24  1214 03/10/24  0552 03/09/24  1641 03/09/24  0555 03/08/24  1247 03/08/24  0549 03/07/24  1402 03/07/24  0541 03/06/24  1203 03/06/24  0110   SODIUM mmol/L 130* 130* 132* 132* 135* 132* 136  --  133* 133* 132* 134*   POTASSIUM mmol/L 4.8 3.7 4.1 3.7 3.9 3.7 4.0  --  4.4 4.6 4.4 4.3   CHLORIDE mmol/L 89* 91* 91* 93* 95* 91* 93*  --  91* 92* 92* 94*   CO2 mmol/L 24 25 25 27 28 29 29  --  27 25 26 24   ANION GAP mmol/L 22* 18 20 16 16 16 18  --  19 21* 18 20   BUN mg/dL 43* 31* 28* 25* 19 47* 40*  --  79* 80* 67* 56*   CREATININE mg/dL 5.77* 4.57* 3.61* 3.40* 2.41* 4.48*  3.55*  --  5.17* 4.98* 3.83* 3.01*   EGFR mL/min/1.73m*2 9* 12* 16* 18* 27* 13* 17*  --  11* 11* 15* 20*   MAGNESIUM mg/dL 2.11  --  2.23 1.96 1.96 1.95 1.97  --  2.26 2.33 2.27 2.09   ALBUMIN g/dL 3.6  3.6 3.8 4.1 3.7 4.0 3.5 3.3* 3.6 3.3* 3.6 3.8 3.7   ALT U/L 24 9  --  9  --  7  --  9  --  6*  --  8   AST U/L 52* 44*  --  45*  --  36  --  38  --  35  --  33   BILIRUBIN TOTAL mg/dL 0.8 0.9  --  0.9  --  0.7  --  0.8  --  0.7  --  1.0         CBC:              Results from last 7 days   Lab Units 03/12/24  0439 03/11/24  1314 03/11/24  0014 03/10/24  1214 03/10/24  0552 03/09/24  1641 03/09/24  0555 03/08/24  1247   WBC AUTO x10*3/uL 7.4 8.4 8.8 10.4 8.8 9.4 8.1 9.8   HEMOGLOBIN g/dL 7.9* 7.5* 7.5* 8.1* 6.7* 7.2* 6.7* 7.1*   HEMATOCRIT % 23.9* 23.3* 21.3* 23.4* 19.9* 20.8* 19.3* 21.7*   PLATELETS AUTO x10*3/uL 169 152 132* 127* 118* 126* 160 148*   MCV fL 88 94 85 88 89 87 87 91         COAG:               Results from last 7 days   Lab Units 03/11/24  0014 03/10/24  0552 03/09/24  1641 03/09/24  0555 03/08/24  1247 03/08/24  0549 03/07/24  1402 03/07/24  0541   INR   1.1 1.2* 1.1 1.2* 1.3* 1.3* 1.3* 1.3*         ABO:         ABO TYPE   Date Value Ref Range Status   03/11/2024 O   Final      HEME/ENDO:       Results from last 7 days   Lab Units 03/07/24  1402   TSH mIU/L 14.87*   HEMOGLOBIN A1C % 5.7*         CARDIAC:               Results from last 7 days   Lab Units 03/12/24  0439 03/11/24  0014 03/10/24  0552 03/09/24  0555 03/08/24  0549 03/07/24  1402 03/07/24  0541 03/06/24  0110   LD U/L 424* 571* 557* 420* 496*  --  566* 446*   BNP pg/mL  --   --   --   --   --  4,683*  --   --             ASSESSMENT AND PLAN:   33 year old female with past medical history of heart transplant in March 2022 with postoperative course complicated by upper extremity/internal jugular DVTs, and 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 She was admitted to HFICU on 2/15 with concern for cardiogenic shock secondary to allograft  rejection and decompensated heart failure with multiorgan dysfunction including significant elevation of liver enzymes and nonoliguric acute kidney injury. Endomyocardial biopsy on 2/16 revealed mild ACR with +CD4s and negative HLAs, however multisystem organ failure persisted with increased inotropic requirements. IABP placed on 2/18. Transferred to [facility]ICU on 2/19 for [facility] ECMO cannulation with Dr. Marina for persistent multi-organ system dysfunction. Intubated 2/21 for respiratory failure 2/2 pulmonary edema, extubated 2/24. ECMO de-cannulated 2/29/24 but had worsening HIRAM and overall declined clinical status and IABP was transitioned to R axillary impella 5.5 on 3/1. Now tolerating iHD with plan for completion of PLEX/IVIG this Wednesday. Transferred from CTICU to HFICU on 3/10 for further management.     NEURO:   #No acute issues   - Serial neuro and pain assessments  - PRN Tylenol 975 mg Q8 PRN  - Continue oxy to 2.5mg Q4 PRN.   - PT/OT Consult  - Continue scheduled melatonin 10 mg nightly and Trazodone 50 mg; PRN for insomnia  - CAM ICU score qshift. Sleep/wake cycle hygiene     ENT:  #Epistaxis  - Significant epistaxis 2/28 and 3/3 requiring ENT consult, packing removed by ENT 3/5  - No current evidence of epistaxis.  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)  Toxo: -/-   Hep C: -/-     Rejection/Prophylaxis (transplants):  - Steroids: Continue 10mg PO prednisone daily  - Tacrolimus: 2.0 mg PO @ 0630 & 2.0 mg PO @ 1830 w/ daily levels drawn @ 0600  - Sirolimus: 3mg PO daily w/ daily levels drawn at 0600 (last increase 3/10)  - Tacrolimus goal troughs: 3-5 Daily level 3/11: 3.3  - Sirolimus goal troughs: 7-9 Daily level 3/11: 6.7  - Combined sirolimus/tacrolimus goal of 10-12  - Antifungals: nystatin oral suspension 5 mls Q6  -  Antivirals: Valcyte 450mg Q48hrs due to low grade viremia   - Anti PCP & Toxoplasmosis: Bactrim SS daily --> Holding due to thrombocytopenia (2/23)     Last cardiac biopsy: 2/16/24 with ACR1 and no AMR  Last HLA: 2/16/24 negative for DSAs   Last RHC: 2/16/24 w/ RA 11, PAP 34/14(23), W 19 and C.I. 2.3  Last LHC: 2/18/24 negative for CAV and CAD   Last TTE/BOB: 3/1/24 shows LVEF to 30% and mild RV dysfunction (intra-op impella 5.5 insert)  Osteopenia/osteoporosis prophylaxis: Vitamin D3 and calcium supplements  Peptic/gastric ulcer prophylaxis: Pantoprazole 40mg daily   CAV Prophylaxis: Aspirin 81mg daily & pravastatin daily     - Unclear cause of acute severe graft dysfunction. Most likely smoldering ACR vs. AMR; however, 2xallograft biopsies on 2/16 & 2/20 remain negative for any significant pathology. Notably, both biopsies taken after rejection therapies implemented which may have reduced areas of graft damage.    - DSAs remain negative; however, patient may have non-HLA antibodies present. 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   - Negative CAV & CAD via LHC on 2/18/24   - Completed methylpred steroid pulse w/ 1g Q24 x 3 days (2/16-2/18) and prednisone taper   - Thymoglobulin doses: 2/18 & 2/19   - IVIG + PLEX Session: 2/18, 2/20, 3/7, 3/11, 3/13  - Graft function slightly worse after transition to impella 5.5 on 3/1. IABP removed 3/1/24.     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  #Impella 5.5 support 3/1/24  - Maintain goal MAP 70-90  - Continue slow wean of impella support as clinically indicated with evidence of adequate end organ perfusion  - Continue Impella P level @ 5, wean per clinical picture / hemodynamics   - C/w Sodium bicarb purge for Impella   - Continue milrinone 0.25mcg/kg/min  - Continue afterload reduction with PO hydralazine 100mg q8h, PO isosorbide 40mg every 8  hours  - Hold home amlodipine  - Continue ASA and Crestor daily   - Cath lab order for SGC 3/11 - unable to do d/t multiple clots      #Advanced therapy evaluation  -Plan for discussion at committee tomorrow 3/11/2024  - Dentistry wants facial CT to reassess  - Discuss with team what parts of her evaluation needs to be completed      PULM:   #Acute respiratory failure 2/21-2/24 (resolved)   - Remains on RA   - CXR as needed  - Maintain SPO2 > 92%     GI:   #History of gastric bypass   #History of MMF colitis  #Acute transaminitis - Resolved   - Continue home PPI, calcitriol 0.5mg daily, multivitamin, calcium carbonate 1,250mg BID  - Continue renal diet  - Continue miralax BID & senna-colace BID  - Last BM 3/10     :   #Acute renal failure 2/2 to cardiorenal syndrome   #IHD T/Th/Sa  - Baseline Cr 1.2-1.3.   - RFP daily and PRN  - Bladder scan daily  - CVVH stopped on 2/27/24  - Transplant nephrology following closely for IHD schedule      ENDO:   #T2DM  - Steroid induced hyperglycemia acceptable glycemic control on SSI.   - Maintain BG <180 with hypoglycemia protocol  - Continue SSI     #Hypothyroidism  - TSH 14.87, T4 1.37, T3 60  - Decreased Synthroid to 200 mcg daily on 3/11, redraw TSH on 3/11 (ordered)  - Endocrine following      HEME:   #Acute DVT L IJ vein and SVT left cephalic vein and right cephalic vein   #Iron defecency anemia  #Acute blood loss anemia   #Multiple transfusions   - CBC daily and PRN   - Continue ferrous sulfate daily  - SQH only per discussion with Dr. Bright 3/7 with new findings of DVT  - Continue SCDs for DVT prophylaxis  - Continue Aranesp every 2 weeks  - Last type and screen: 3/8, update tonight 3/12     ID:   #No acute issues  - Received Zosyn and Vancomycin on 2/15 in ED  - Blood cultures from 2/15 negative  - Trend temp q4h  - Afebrile and nontoxic     PHYSICAL AND OCCUPATIONAL THERAPY: ordered and following      LINES:  PIVs  RIJ trialysis catheter 3/5  R Axillary Impella  3/1     DVT: SCDs, subcutaneous heparin   VAP BUNDLE: N/A  ULCER PPX: PPI  GLYCEMIC CONTROL: SSI  BOWEL CARE: Patti-colace BID, miralax BID  INDWELLING CATHETER: N/A  NUTRITION: Adult diet Renal; Potassium Restricted 2 gm (50mEq); 2 - 3 grams Sodium        EMERGENCY CONTACT: Extended Emergency Contact Information  Primary Emergency Contact: SAHIL DIMAS  Address: [address] [place] [address]  Home Phone: [phone]  Mobile Phone: [phone]  Relation: Mother  FAMILY UPDATE: given at bedside  CODE STATUS: Full Code  DISPO: remain in HFICU    For groin swan placement in cath lab

## 2024-03-12 NOTE — CONSULTS
Inpatient consult to Vascular Medicine  Consult performed by: Sekou Cervantes MD  Consult ordered by: Estephania Enriquez, APRN-CNP  Reason for consult: multiple DVTs/thrombi      History Of Present Illness:    Charla Bowles is a 34 y.o. 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 POCUS showed reduced EF 35% EST BY REPORT with prior echo showing EF 60-65% (11/29/23). Milrinone and Levophed started. Admitted to HFICU for cardiogenic shock and concern for acute rejection. Rt [place] placed 2/16/24.  Right heart cath completed with completion of endomyocardial biopsy on 2/16/24. Biopsy showed C3d and C4d positive with no conclusive histopathological changes consistent with antibody mediated rejection (suspicion remained high due to immunosuppression, presentation of cardiogenic shock in setting of allograft dysfunction and LVH).  Given Solumedrol 1 gm x 3 doses. Therapeutic plasma exchange was started for antibody mediated rejection. Left Hemodialysis catheter (nontunneled) placed on 2/17/24. IABP placed 2/18/24 due to worsening cardiogenic shock. After conversation with Dr. Alexx Heredia- 2/19/24 ([facility]) he believed there is no AMR due to minute levels of complement. Started becoming oliguric/anuric and started CVVH. ECMO cannulation completed on 2/19 for VA ECMO with Dr. Marina for multisystem dysfunction with addition of oliguric/anuric renal insufficiency. Second endomycoardial biopsy completed on 2/20/24 which showed no further evidence of rejection. Continued prednisone taper and sirolimus/tacrolimus.  Intubated 2/21/24 due to worsening pulmonary edema. Vasc Surgery consulted for LLE loss of dopplerable signals and recommended no urgent vascular intervention. Patient's condition improved with urine production and lung fields improved. Patient was extubated 2/24/24. ECMO decannulated 2/29/24. [place] and left internal jugular CVC removed on 3/2/24. IABP removed on 3/1/24 and impella was placed on the same day due to poor urine output/rising creatinine and decreased cardiac index.    UE and LE Venous duplex completed on 3/6/24 showed Rt SVT in cephalic vein. Could not rule out thrombus in RIGHT subclavian, internal jugular due to line and impella. LUE showed DVT in left internal jugular, SVT in cephalic vein. Found to have DVT in left internal jugular and proximal DVT in left subclavian and was unable to complete right heart catheterization on 3/11/24. CONSULT PLACED TO  REGARDING DVT MANAGEMENT.     The patient was initially placed on Bivalirudin from 2/22-2/26 due to concern for HIT (PF4 was negative). The patient was placed on heparin prophylaxis from 2/26 to today.     Seen in 2/10/22 by  due to elevated pressure and placement of Right IJ [place] Estevan placed as well as right arm PICC line; both lines have been removed. RUE US that showed DVT of right jugular and axillary veins, as well as SVT in right basilic and cephalic vein. Discharged on Lovenox 60 mg BID (recommended 90 mg BID by  note). Completed treatment on 4/27/22. ?WHY THERE WAS ONLY 2 MONTHS AC FOR THIS EVENT.     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   SH: never smoked    Review  of systems:  No fevers, chills, weight is stable AGREE  Np shortness of breath, + cough AGREE  No chest pain, palpitations AGREE  No Abdominal pain, diarrhea, nausea, vomiting, AGREE  No headaches, seizures, syncopal events AGREE  No hematuria, hematochezia, or melena AGREE  + swelling in the legs, No swelling in the arms AGREE  No paresthesias      Last Recorded Vitals:  Vitals:    03/12/24 0300 03/12/24 0400 03/12/24 0500 03/12/24 0600   BP: 105/76 106/70 115/83 101/69   Pulse: (!) 134 (!) 136 (!) 134 (!) 134   Resp: (!) 42 (!) 37 (!) 33 (!) 34   Temp: 36.6 °C (97.9 °F)      TempSrc: Temporal      SpO2: 93% 94% 98%    Weight:       Height:           Last Labs:  CBC - 3/12/2024:  4:39 AM  7.4 7.9 169    23.9      CMP - 3/12/2024:  4:39 AM  10.4 5.9 52 --- 0.8   5.5 3.6; 3.6 24 80      PTT - 3/11/2024: 12:14 AM  1.1   12.5 20     Troponin I, High Sensitivity   Date/Time Value Ref Range Status   02/16/2024 01:16  (HH) 0 - 34 ng/L Final     Comment:     Previous result verified on 2/16/2024 0018 on specimen/case [id number] called with component [facility] for procedure Troponin I, High Sensitivity with value 125 ng/L.   02/15/2024 10:03  (HH) 0 - 34 ng/L Final     Troponin I   Date/Time Value Ref Range Status   11/10/2022 11:22  (H) 0 - 34 ng/L Final     Comment:     .  Less than 99th percentile of normal range cutoff-  Female and children under 18 years old <35 ng/L; Male <54 ng/L: Negative  Repeat testing should be performed if clinically indicated.   .  Female and children under 18 years old  ng/L; Male  ng/L:  Consistent with possible cardiac damage and possible increased clinical   risk. Serial measurements may help to assess extent of myocardial damage.   .  >120 ng/L: Consistent with cardiac damage, increased clinical risk and  myocardial infarction. Serial measurements may help assess extent of   myocardial damage.   .   NOTE: Children less than 1 year old may have higher baseline  troponin   levels and results should be interpreted in conjunction with the overall   clinical context.  .  NOTE: Troponin I testing is performed using a different   testing methodology at [facility] than at other   [facility] hospitals. Direct result comparisons should only   be made within the same method.       BNP   Date/Time Value Ref Range Status   03/07/2024 02:02 PM 4,683 (H) 0 - 99 pg/mL Final   02/16/2024 01:16 AM >5,000 (H) 0 - 99 pg/mL Final     Hemoglobin A1C   Date/Time Value Ref Range Status   03/07/2024 02:02 PM 5.7 (H) see below % Final   02/16/2024 01:16 AM 6.3 (H) see below % Final     LDL Calculated   Date/Time Value Ref Range Status   02/16/2024 01:16 AM 94 <=99 mg/dL Final     Comment:                                 Near   Borderline      AGE      Desirable  Optimal    High     High     Very High     0-19 Y     0 - 109     ---    110-129   >/= 130     ----    20-24 Y     0 - 119     ---    120-159   >/= 160     ----      >24 Y     0 -  99   100-129  130-159   160-189     >/=190       VLDL   Date/Time Value Ref Range Status   02/16/2024 01:16 AM 28 0 - 40 mg/dL Final   07/18/2023 09:24 AM 32 0 - 40 mg/dL Final   03/17/2023 08:38 AM 40 0 - 40 mg/dL Final   11/10/2022 11:22 PM 44 (H) 0 - 40 mg/dL Final      Last I/O:  I/O last 3 completed shifts:  In: 4120 (41.4 mL/kg) [I.V.:254 (2.6 mL/kg); Other:3666; IV Piggyback:200]  Out: 3603 (36.2 mL/kg) [Other:3603]  Dosing Weight: 99.6 kg     Imaging:  UE and LE venous duplex 3/6/24:  CONCLUSIONS:  Right Upper Venous: There is acute occlusive superficial venous thrombosis visualized in the mid cephalic and acute occlusive superficial venous thrombosis visualized in the distal cephalic veins. Axillary vein compression images were not saved to review. The remainder of veins in right arm are negative for deep vein thrombus. Cannot rule out thrombus of non-compressible proximal subclavian vein due to Impella. Cannot rule out thrombus of  non-visualized internal jugular, distal subclavian and mid subclavian veins due to line and Impella.  Left Upper Venous: There is acute non-occlusive deep vein thrombosis visualized in the internal jugular and acute non-occlusive superficial venous thrombosis visualized in the mid cephalic veins. The remainder of veins in left arm are negative for deep vein thrombus.      Inpatient Medications:  Scheduled medications   Medication Dose Route Frequency    aspirin  81 mg oral Daily    bisacodyl  10 mg rectal Once    calcitriol  0.5 mcg oral Daily    calcium gluconate  2 g intravenous Once    darbepoetin floyd  100 mcg subcutaneous q14 days    ferrous sulfate (325 mg ferrous sulfate)  65 mg of iron oral Daily with breakfast    heparin (porcine)  5,000 Units subcutaneous q8h    hydrALAZINE  100 mg oral q8h    insulin lispro  0-10 Units subcutaneous Before meals & nightly    isosorbide dinitrate  40 mg oral q8h    levothyroxine  200 mcg oral Daily    melatonin  10 mg oral Nightly    multivitamin with minerals  1 tablet oral Daily    nystatin  5 mL Swish & Swallow q6h    pantoprazole  40 mg oral Daily before breakfast    perflutren lipid microspheres  0.5-10 mL of dilution intravenous Once in imaging    perflutren protein A microsphere  0.5 mL intravenous Once in imaging    polyethylene glycol  17 g oral BID    predniSONE  10 mg oral Daily    rosuvastatin  40 mg oral Nightly    sennosides-docusate sodium  2 tablet oral BID    sirolimus  3 mg oral Daily    sodium chloride  5 spray Each Nostril 4x daily    [Held by provider] sulfamethoxazole-trimethoprim  80 mg of trimethoprim oral Daily    sulfur hexafluoride microsphr  2 mL intravenous Once in imaging    tacrolimus  2 mg oral q12h TRICIA    traZODone  50 mg oral Nightly    valGANciclovir  450 mg oral q48h     PRN medications   Medication    acetaminophen    bisacodyl    dextrose    dextrose    Or    glucagon    glucagon    artificial tears    microfibrllar collagen     mupirocin    ondansetron    oxyCODONE    sodium chloride     Continuous Medications   Medication Dose Last Rate    lactated Ringer's  10 mL/hr Stopped (03/03/24 1200)    milrinone  0.25 mcg/kg/min (Dosing Weight) 0.25 mcg/kg/min (03/12/24 0400)    sodium bicarbonate infusion Impella Purge 25 mEq/1000 mL D5W  10 mL/hr 10 mL/hr (03/10/24 1510)     Outpatient Medications:  Current Outpatient Medications   Medication Instructions    Alcohol Prep Pads pads, medicated     amLODIPine (Norvasc) 2.5 mg tablet TAKE ONE (1) TABLET BY MOUTH ONCE DAILY    aspirin 81 mg EC tablet TAKE ONE (1) TABLET BY MOUTH ONCE A DAY    blood sugar diagnostic (OneTouch Verio test strips) strip 4 times daily    calcitriol (Rocaltrol) 0.5 mcg capsule TAKE ONE (1) CAPSULE BY MOUTH ONCE DAILY.    calcium carbonate (Oscal) 500 mg calcium (1,250 mg) tablet TAKE ONE (1) TABLET BY MOUTH TWICE DAILY. TAKE AT LEAST 2 HOURS BEFORE OR 2 HOURS AFTER IMMUNOSUPPRESSION MEDICATIONS.    docusate sodium (COLACE) 100 mg, oral, 2 times daily PRN    ferrous sulfate, 325 mg ferrous sulfate, tablet TAKE ONE (1) TABLET BY MOUTH DAILY.    insulin lispro (HumaLOG) 100 unit/mL injection USE FOR SLIDING SCALE INSULIN COVERAGE UP TO 4 TIMES DAILY AS DIRECTED. MAX OF 40 UNITS PER DAY.    levothyroxine (Synthroid, Levoxyl) 200 mcg tablet TAKE ONE (1) TABLET BY MOUTH ONCE DAILY.    magnesium oxide (Mag-Ox) 400 mg (241.3 mg magnesium) tablet TAKE TWO (2) TABLETS BY MOUTH DAILY    multivitamin tablet 1 tablet, oral, Daily    pantoprazole (ProtoNix) 40 mg EC tablet TAKE ONE (1) TABLET BY MOUTH DAILY.    predniSONE (Deltasone) 5 mg tablet TAKE ONE (1) TABLET BY MOUTH ONCE DAILY.    rosuvastatin (Crestor) 40 mg tablet TAKE ONE (1) TABLET BY MOUTH DAILY.    sirolimus (RAPAMUNE) 0.5 mg, oral, Daily    tacrolimus (PROGRAF) 1 mg, oral, 2 times daily    tacrolimus (PROGRAF) 0.5 mg, oral, 2 times daily       Physical Exam:  Constitutional: no acute distress AGREE  NECK SUPPLE  TRIALYSIS  CATHETER RIGHT  - CDI  LEFT NECK WITH HEALED PUNCTURE SITE BUT NO ERYTHEMA OR REDNESS ETC  CVS: tachycardic, regular rhythm AGREE, +MECHANICAL HUM  RS: CTA bilaterally AGREE  Abdomen: soft, nontender AGREE  Neck: incision trey noted over left neck with no evidence of swelling or tenderness to palpation; Dialysis line on right neck AGREE  Upper extremities: no significant swelling seen in bilateral UE, Radial 2+ AGREE  Lower extremities: mild swelling present in bilateral LE, DP 2+ AGREE   NEURO NONFOCAL  MOOD AND AFFECT CONGRUENT WITH THE SITUATION       Assessment/Plan   34 y.o. 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 Patient was found to have reduced heart function at admission compared to prior echocardiogram. Admitted to HFICU for cardiogenic shock and concern for acute rejection. Solumedrol and prednisone taper were given to the patient. Endomyocardial biopsies ruled out concerns for acute rejection. The patient developed cardiogenic shock c/b multiorgan dysfunction with oliguria requiring CVVH treatment.     Rt [place] placed 2/16/24 and Left Hemodialysis catheter (nontunneled) placed on 2/17/24. Both removed on 3/2/24. Rt CVC placed 3/5/24 and removed on 3/10/24. Additional Hemodialysis cath placed on 3/10/24. UE and LE Venous duplex completed on 3/6/24 showed Rt SVT in cephalic vein. Could not rule out thrombus in Rt subclavian, internal jugular due to line and impella. LUE showed DVT in left internal jugular, SVT in cephalic vein. This was likely a line related event in the setting of prolonged hospital stay AND INDWELLING ACCESS. The patient has been on  "heparin prophylaxis since 2/26 to today. Dr. Bright wanted heparin prophylaxis given the patient's condition when DVT was first diagnosed. DVT is likely line related and needs therapeutic anticoagulation to be treated. AGREE     After discussion with the primary team and Dr. Bright, we have agreed to starting therapeutic anticoagulation. For [place] placement, will likely have to approach from the groin due to Hemodialysis line on right side and DVT on the left side. AGREE    Plan:  - Start heparin gtt. Follow therapeutic assays. AGREE  - Monitor for bleeding AGREE  - Will discuss home going anticoagulation when the patient is closer to discharge date. AGREE    Sekou Cervantes  Vascular Medicine Fellow    PATIENT SEEN AND EXAMINED. HX INDEPENDENTLY CONFIRMED.   CLINICALLY ASYMPTOMATIC FROM THIS EVENT    REC CHECK VL DUPLEX FOR THE EXTENT OF THE DVT  BASED ON US 3/6 COMPARED TO THE POCUS REPORTED - THERE HAS BEEN PROPAGATION ? GIVEN NO AC SINCE DX.    MY ADDITIONS ARE NOTED IN \"CAPS\".    VM following  Please page [phone] for any concerns    Skylar Prescott MD    "

## 2024-03-12 NOTE — COMMITTEE REVIEW
[facility] Advanced Heart Failure Therapeutics Committee     Patient's name: Charla Bowles   Referring provider: Dr. Price   Primary HF cardiologist: Dr. Cornell    Primary diagnosis: Cardiogenic shock     Background information: Ms. Yimi Bowles is a 33 year old female with past medical history of heart transplant in March 2022 with postoperative course complicated by upper extremity/internal jugular DVTs, and 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 She was admitted to HFICU on 2/15 with concern for cardiogenic shock secondary to allograft rejection and decompensated heart failure with multiorgan dysfunction including significant elevation of liver enzymes and nonoliguric acute kidney injury. Endomyocardial biopsy on 2/16 revealed mild ACR and no evidence of vascular rejection. HLA blood work was negative for DSAs. However multisystem organ failure persisted with increased inotropic requirements. IABP placed on 2/18. Transferred to CTICU on 2/19 for VA ECMO cannulation with Dr. Marina for persistent multisystem dysfunction.    Committee decision and plan of care (Please discuss modifiable/non-modifiable barriers, plan to address barriers, and any relevant plan for reassessment if applicable): Ms. Yimi Bowles' case was discussed in detail at our Advanced Heart Failure Therapeutics Committee. It was decided that she is too sick (end-organ dysfunction) to proceed with heart transplant listing at this time. She will need to have an evaluation with nephrology and kidney transplant team as next step, and before she can be young back to Committee.

## 2024-03-12 NOTE — LETTER
March 12, 2024    Charla REGINALD Bowles  [address] [address]      Dear Ms. Yimi Bowles:    Our multi-disciplinary transplant team completed a review of your medical records on 3/12/2024.  Needs Re-presentation    Our transplant program consists of surgeons and medical doctors who provide coverage 365 days a year, 24 hours a day.     If you have any questions or concerns regarding your insurance coverage or billing issues, a  is available to speak with you.     It is important to keep us updated of any major changes in your medical condition, contact information and health insurance coverage.     Please don't hesitate to contact us at Dept: [phone] with any questions or concerns. We look forward to working with you through this process.      Sincerely,      Claudia De La O RN          The UNOS Toll-free Patient Services Line:  Your Resource for Organ Transplant Information    If you have a question regarding your own medical care, you always should call your transplant hospital first. However, for general organ transplant-related information, you should call the [employer] (UNOS) toll-free patient services line at 1-470.758.6490.  Anyone, including potential transplant candidates, candidates, recipients, family members, friends, living donors, and donor family members, can call this number to:    Talk about organ donation, living donation, the transplant process, the donation process, and transplant policies.  Get a free patient information kit with helpful booklets, waiting list and transplant information, and a list of all transplant hospitals.  Ask questions about the Organ Procurement and Transplantation Network (OPTN) web site (http://optn.transplant.hrsa.gov/), the UNOS Web site (http://unos.org/), or the UNOS web site for living donors and transplant recipients. (http://www.transplantliving.org/).  Learn how UNOS and the OPTN can help  you.  Talk about any concerns that you may have with a transplant hospital.    [facility] is a not-for-profit organization that provides the administrative services for the national OPTN under federal contract to the Health Resources and Services Administration (HRSA), an agency under the U.S. Department of Health and Human Services (HHS).    [facility] and the OPTN are responsible for:    Providing educational material for patients, the public, and professionals.  Raising awareness of the need for donated organs and tissue.  Writing organ transplant policy with help from transplant professionals, transplant patients, transplant candidates, donor families, living donors, and the public.  Coordinating organ procurement, matching, and placement.  Collecting information about every organ transplant and donation that occurs in the [place].    Remember, you should contact your transplant hospital directly if you have questions or concerns about your own medical care including medical records, work-up progress, and test results.    [facility] is not your transplant hospital, and staff at [facility] will not be able to transfer you to your transplant hospital, so keep your transplant hospital’s phone number handy.    However, while you research your transplant needs and learn as much as you can about transplantation and donation, we welcome your call to our toll-free patient services line at [phone].      [facility] PIL Final Rev 1-

## 2024-03-12 NOTE — PRE-SEDATION DOCUMENTATION
Sedation Plan    ASA 4     Mallampati class: II.    Risks, benefits, and alternatives discussed with patient.

## 2024-03-12 NOTE — PROGRESS NOTES
Heart failure ICU attending note  33-year-old female with a medical history of stage D HFrEF status post heart transplantation March 2022 with postop course complicated by symptomatic to our ACR November 2022.  Patient presented in the setting of nausea/vomiting and subtherapeutic sirolimus/tacrolimus levels with low ejection fraction/new LVH and cardiogenic shock.  In terms of immunosuppressive therapies she received pulse steroids, has been maintained on tacrolimus/sirolimus, 2 sessions of IVIG/plasmapheresis on February 18 and 20, 2024; and 2 doses of Thymoglobulin 2/18 and 2/19.  Following an endomyocardial biopsy 2/20/2024 which was negative for rejection (in the setting of negative DSAs) - the Thymoglobulin/IVIG/plasmapheresis sessions were stopped.   IVIG/plasmapheresis resumed March 5, 2024.  Hemodynamically had been supported with ECMO initially, which was then weaned off.  Unfortunately, she required hemodynamic support again, and Impella 5 was placed.  She was eventually transferred to heart failure ICU with Impella 5.  Symptomatically doing well, but continues to have tachycardia.  Symptom wise tolerating wean to Impella P5 reasonably well.  Also tolerating Plex plus IVIG, as well as hemodialysis well.  Cardiac function is certainly no worse, and there appears to be decreased left ventricular hypertrophy on echocardiogram indicating of decreased edema.  Unfortunately, renal function appears to be heading the wrong way.  Plan.  Case was discussed at the transplant meeting.  Given concerns about unknown cause of LV dysfunction, possibly rejection, lack of tolerance of CellCept as well as azathioprine, and kidney dysfunction, she was felt to be not a candidate for cardiac transplantation at this time.  Similarly, ventricular assist device, and face of active immunosuppression was felt to be less than ideal, but was not completely ruled out.  We will request input from renal transplant team as well.  The  etiology of her cardiac dysfunction is by no means clear.  While her presentation is very suspicious for rejection, her biopsies have essentially been negative as have been a DSA's.  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  Plan is to complete 5 cycles of plasmapheresis and IVIG, to be completed on 3/13/2024.  Hemodynamically, she will be supported with Impella for now.  We will place a [place]-Estevan catheter tomorrow to the groin, given continued venous thrombosis in the upper extremities and neck.  We will attempt digoxin to try and slow her heart rate down, which remains quite tachycardic.  Based on an echocardiogram, we may challenge her with Myfortic to see if she tolerates this.  Previously, she had biopsy-proven colitis with CellCept.    I spent time talking with the patient and her family regarding the plan.  They expressed understanding.  This critically ill patient continues to be at-risk for clinically significant deterioration / failure due to the above mentioned dysfunctional, unstable organ systems.  I have personally identified and managed all complex critical care issues to prevent aforementioned clinical deterioration.  Critical care time is spent at bedside and/or the immediate area and has included, but is not limited to, the review of diagnostic tests, labs, radiographs, serial assessments of hemodynamics, respiratory status, ventilatory management, and family updates.  Time spent in procedures and teaching are reported separately.    Critical care time:55minutes
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
## 2024-03-12 NOTE — PROGRESS NOTES
Physical Therapy                 Therapy Communication Note    Patient Name: Charla Bowles  MRN: [id number]  Today's Date: 3/12/2024     Discipline: Physical Therapy    Missed Visit Reason: Missed Visit Reason:  (Pt on hold at this time 2/2 pt on iHD and not available for PT.)    Missed Time: Attempt; 12:13    Comment:

## 2024-03-12 NOTE — PROGRESS NOTES
[facility]  HFICU PROGRESS NOTE    Charla Bowles/[id number]    Admit Date: 2/15/2024  Hospital Length of Stay: 26   ICU Length of Stay: 1d 22h   Primary Service: HFICU  Primary HF Cardiologist: Dr Cornell  Referring: Dr Cornell     INTERVAL EVENTS / PERTINENT ROS:     No acute events overnight. Impella 5.5 P-level decreased to P-5 yesterday (3/11). Echo completed yesterday with little change from previous study demonstrating EF ~ 25 - 30% / moderate mitral valve regurgitation. She was taken to the cath lab evening of 03/11 and RHC was aborted 2/2 left internal jugular thrombosis.  Patient did void this AM, but not captured. She continues on Milrinone gtt @ 0.25 mcg/kg/min and Impella support P-level 5. Her lactate has been < 2.0 and this AM hemodynamic labs include lactate 1.3; and MvO2 71%. She underwent hemodialysis and noted to be more tachycardic / tachypneic post treatment, concerning for worsening cardiac function.  Afternoon SvO2 now 56% and plans are made to go to the cath lab for groin SWAN insertion to assess filling pressures and cardiac output / function.        Plan:  - Hemodialysis today - completed   - Cath lab for groin swan ordered   - Initiate 1x Digoxin 125 mcg and obtain Dig level tomorrow morning  - U/S upper and lower extremities for DVT - completed  - Family meeting at 3:00 pm today w/ Dr. Melo   - Continue Impella support at P-5  - Initiate systemic heparin   - Plan for Thursday: Limited echo to assess LV function - ordered  - Thursday: Plan to stop Sirolimus and increase Tacrolimus and start Mycophenolate       MEDICATIONS  Infusions:  lactated Ringer's, Last Rate: Stopped (03/03/24 1200)  milrinone, Last Rate: 0.25 mcg/kg/min (03/12/24 1225)  sodium bicarbonate infusion Impella Purge 25 mEq/1000 mL D5W, Last Rate: 10 mL/hr (03/12/24 8654)      Scheduled:  aspirin, 81 mg, Daily  bisacodyl, 10 mg, Once  calcitriol, 0.5 mcg, Daily  calcium gluconate, 2  g, Once  darbepoetin floyd, 100 mcg, q14 days  ferrous sulfate (325 mg ferrous sulfate), 65 mg of iron, Daily with breakfast  heparin (porcine), 5,000 Units, q8h  hydrALAZINE, 100 mg, q8h  insulin lispro, 0-10 Units, Before meals & nightly  isosorbide dinitrate, 40 mg, q8h  levothyroxine, 200 mcg, Daily  melatonin, 10 mg, Nightly  multivitamin with minerals, 1 tablet, Daily  nystatin, 5 mL, q6h  pantoprazole, 40 mg, Daily before breakfast  perflutren lipid microspheres, 0.5-10 mL of dilution, Once in imaging  perflutren protein A microsphere, 0.5 mL, Once in imaging  polyethylene glycol, 17 g, BID  predniSONE, 10 mg, Daily  rosuvastatin, 40 mg, Nightly  sennosides-docusate sodium, 2 tablet, BID  sirolimus, 3 mg, Daily  sodium chloride, 5 spray, 4x daily  [Held by provider] sulfamethoxazole-trimethoprim, 80 mg of trimethoprim, Daily  sulfur hexafluoride microsphr, 2 mL, Once in imaging  tacrolimus, 2 mg, q12h TRICIA  traZODone, 50 mg, Nightly  valGANciclovir, 450 mg, q48h      PRN:  acetaminophen, 975 mg, q8h PRN  bisacodyl, 10 mg, Daily PRN  dextrose, 25 g, q15 min PRN  dextrose, 25 g, q15 min PRN   Or  glucagon, 1 mg, q15 min PRN  glucagon, 1 mg, q15 min PRN  artificial tears, 2 drop, PRN  microfibrllar collagen, , PRN  mupirocin, , BID PRN  ondansetron, 4 mg, q4h PRN  oxyCODONE, 2.5 mg, q4h PRN  sodium chloride, 1 spray, 4x daily PRN        Impella:      Most Recent Range Past 24hrs   Performance Level 5 P Level  Min: 5   Min taken time: 03/12/24 1200  Max: 5   Max taken time: 03/12/24 1200   Flow (L/min) 2.9 Flow (L/min)  Min: 2.6   Min taken time: 03/11/24 1800  Max: 3.1   Max taken time: 03/12/24 1000   Motor Current 291/207 Motor Current  Min: 270/206   Min taken time: 03/12/24 0800  Max: 291/207   Max taken time: 03/12/24 1200   Placement Signal Yes  Placement OK could not be evaluated. This SmartLink does not work with rows of the type: Custom List   Purge (mmHg) 453 Purge Pressure (mmHg)  Min: 453   Min taken  "time: 03/12/24 1200  Max: 586   Max taken time: 03/12/24 1000   Purge rate (mL/hr) 11 Purge Rate (mL/hr)  Min: 10.5   Min taken time: 03/11/24 1600  Max: 11   Max taken time: 03/12/24 1200       PHYSICAL EXAM:   Visit Vitals  /69   Pulse (!) 139   Temp 36.4 °C (97.5 °F) (Skin)   Resp (!) 34   Ht 1.549 m (5' 0.98\")   Wt 97 kg (213 lb 13.5 oz)   SpO2 98%   BMI 40.43 kg/m²   OB Status Hysterectomy   Smoking Status Never   BSA 2.04 m²       Wt Readings from Last 5 Encounters:   03/11/24 97 kg (213 lb 13.5 oz)   12/07/23 92.1 kg (203 lb)   12/01/23 93 kg (205 lb)   11/29/23 92.9 kg (204 lb 12.8 oz)   11/09/23 91.3 kg (201 lb 3.2 oz)       INTAKE/OUTPUT:  I/O last 3 completed shifts:  In: 4120 (41.4 mL/kg) [I.V.:254 (2.6 mL/kg); Other:3666; IV Piggyback:200]  Out: 3603 (36.2 mL/kg) [Other:3603]  Dosing Weight: 99.6 kg      Physical Exam  Constitutional:       General: She is not in acute distress.     Appearance: Normal appearance. She is not ill-appearing.   HENT:      Head: Normocephalic.      Mouth/Throat:      Mouth: Mucous membranes are moist.   Eyes:      Pupils: Pupils are equal, round, and reactive to light.   Neck:      Comments: RIJ dialysis line present   Cardiovascular:      Rate and Rhythm: Regular rhythm. Tachycardia present.      Pulses: Normal pulses.      Heart sounds: Normal heart sounds.   Pulmonary:      Effort: Pulmonary effort is normal. No respiratory distress.      Breath sounds: Normal breath sounds.   Abdominal:      General: Bowel sounds are normal.      Palpations: Abdomen is soft.   Musculoskeletal:         General: Normal range of motion.      Cervical back: Normal range of motion.      Right lower leg: Edema present.      Left lower leg: Edema present.   Skin:     General: Skin is warm.      Capillary Refill: Capillary refill takes less than 2 seconds.   Neurological:      General: No focal deficit present.      Mental Status: She is alert and oriented to person, place, and time. "         DATA:  CMP:  Results from last 7 days   Lab Units 03/12/24  0439 03/11/24  0014 03/10/24  1214 03/10/24  0552 03/09/24  1641 03/09/24  0555 03/08/24  1247 03/08/24  0549 03/07/24  1402 03/07/24  0541 03/06/24  1203 03/06/24  0110   SODIUM mmol/L 130* 130* 132* 132* 135* 132* 136  --  133* 133* 132* 134*   POTASSIUM mmol/L 4.8 3.7 4.1 3.7 3.9 3.7 4.0  --  4.4 4.6 4.4 4.3   CHLORIDE mmol/L 89* 91* 91* 93* 95* 91* 93*  --  91* 92* 92* 94*   CO2 mmol/L 24 25 25 27 28 29 29  --  27 25 26 24   ANION GAP mmol/L 22* 18 20 16 16 16 18  --  19 21* 18 20   BUN mg/dL 43* 31* 28* 25* 19 47* 40*  --  79* 80* 67* 56*   CREATININE mg/dL 5.77* 4.57* 3.61* 3.40* 2.41* 4.48* 3.55*  --  5.17* 4.98* 3.83* 3.01*   EGFR mL/min/1.73m*2 9* 12* 16* 18* 27* 13* 17*  --  11* 11* 15* 20*   MAGNESIUM mg/dL 2.11  --  2.23 1.96 1.96 1.95 1.97  --  2.26 2.33 2.27 2.09   ALBUMIN g/dL 3.6  3.6 3.8 4.1 3.7 4.0 3.5 3.3* 3.6 3.3* 3.6 3.8 3.7   ALT U/L 24 9  --  9  --  7  --  9  --  6*  --  8   AST U/L 52* 44*  --  45*  --  36  --  38  --  35  --  33   BILIRUBIN TOTAL mg/dL 0.8 0.9  --  0.9  --  0.7  --  0.8  --  0.7  --  1.0     CBC:  Results from last 7 days   Lab Units 03/12/24  0439 03/11/24  1314 03/11/24  0014 03/10/24  1214 03/10/24  0552 03/09/24  1641 03/09/24  0555 03/08/24  1247   WBC AUTO x10*3/uL 7.4 8.4 8.8 10.4 8.8 9.4 8.1 9.8   HEMOGLOBIN g/dL 7.9* 7.5* 7.5* 8.1* 6.7* 7.2* 6.7* 7.1*   HEMATOCRIT % 23.9* 23.3* 21.3* 23.4* 19.9* 20.8* 19.3* 21.7*   PLATELETS AUTO x10*3/uL 169 152 132* 127* 118* 126* 160 148*   MCV fL 88 94 85 88 89 87 87 91     COAG:   Results from last 7 days   Lab Units 03/11/24  0014 03/10/24  0552 03/09/24  1641 03/09/24  0555 03/08/24  1247 03/08/24  0549 03/07/24  1402 03/07/24  0541   INR  1.1 1.2* 1.1 1.2* 1.3* 1.3* 1.3* 1.3*     ABO:   ABO TYPE   Date Value Ref Range Status   03/11/2024 O  Final     HEME/ENDO:  Results from last 7 days   Lab Units 03/07/24  1402   TSH mIU/L 14.87*   HEMOGLOBIN A1C % 5.7*       CARDIAC:   Results from last 7 days   Lab Units 03/12/24  0439 03/11/24  0014 03/10/24  0552 03/09/24  0555 03/08/24  0549 03/07/24  1402 03/07/24  0541 03/06/24  0110   LD U/L 424* 571* 557* 420* 496*  --  566* 446*   BNP pg/mL  --   --   --   --   --  4,683*  --   --        ASSESSMENT AND PLAN:   33 year old female with past medical history of heart transplant in March 2022 with postoperative course complicated by upper extremity/internal jugular DVTs, and 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 She was admitted to HFICU on 2/15 with concern for cardiogenic shock secondary to allograft rejection and decompensated heart failure with multiorgan dysfunction including significant elevation of liver enzymes and nonoliguric acute kidney injury. Endomyocardial biopsy on 2/16 revealed mild ACR with +CD4s and negative HLAs, however multisystem organ failure persisted with increased inotropic requirements. IABP placed on 2/18. Transferred to CTICU on 2/19 for [facility] ECMO cannulation with Dr. Marina for persistent multi-organ system dysfunction. Intubated 2/21 for respiratory failure 2/2 pulmonary edema, extubated 2/24. ECMO de-cannulated 2/29/24 but had worsening HIRAM and overall declined clinical status and IABP was transitioned to R axillary impella 5.5 on 3/1. Now tolerating iHD with plan for completion of PLEX/IVIG this Wednesday. Transferred from CTICU to HFICU on 3/10 for further management. 03/12: She continues on Milrinone gtt @ 0.25 mcg/kg/min and Impella support decreased to P-level 5 on 03/11. Her lactate has been < 2.0 and this AM hemodynamic labs include lactate 1.3; and MvO2 71%. She underwent hemodialysis and noted to be more tachycardic / tachypneic post treatment.     NEURO:   #No acute issues   - Serial neuro and pain assessments  - PRN Tylenol 975 mg Q8 PRN  - Continue oxy to 2.5mg Q4 PRN.   - PT/OT Consult  - Continue scheduled melatonin 10 mg nightly and Trazodone 50 mg; PRN for insomnia  - CAM ICU  score qshift. Sleep/wake cycle hygiene     ENT:  #Epistaxis  - Significant epistaxis 2/28 and 3/3 requiring ENT consult, packing removed by ENT 3/5  - No current evidence of epistaxis.  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)  Toxo: -/-   Hep C: -/-     Rejection/Prophylaxis (transplants):  - Steroids: Continue 10mg PO prednisone daily  - Tacrolimus: 2.0 mg PO @ 0630 & 2.0 mg PO @ 1830 w/ daily levels drawn @ 0600  - Sirolimus: 3mg PO daily w/ daily levels drawn at 0600 (last increase 3/10)  - Tacrolimus goal troughs: 3-5 Daily level 3/12: 4.7  - Sirolimus goal troughs: 7-9 Daily level 3/12: 8.6  - Combined sirolimus/tacrolimus goal of 10-12  - Plans on Thursday to stop sirolimus and transition to tacrolimus and myfortic per Dr Melo  - Antifungals: nystatin oral suspension 5 mls Q6  - Antivirals: Valcyte 450mg Q48hrs due to low grade viremia   - Anti PCP & Toxoplasmosis: Bactrim SS daily --> Holding due to thrombocytopenia (2/23)     Last cardiac biopsy: 2/16/24 with ACR1 and no AMR  Last HLA: 2/16/24 negative for DSAs   Last RHC: 2/16/24 w/ RA 11, PAP 34/14(23), W 19 and C.I. 2.3  Last LHC: 2/18/24 negative for CAV and CAD   Last TTE/BOB: 3/1/24 shows LVEF to 30% and mild RV dysfunction (intra-op impella 5.5 insert)  Osteopenia/osteoporosis prophylaxis: Vitamin D3 and calcium supplements  Peptic/gastric ulcer prophylaxis: Pantoprazole 40mg daily   CAV Prophylaxis: Aspirin 81mg daily & pravastatin daily     - Unclear cause of acute severe graft dysfunction. Most likely smoldering ACR vs. AMR; however, 2x allograft biopsies on 2/16 & 2/20 remain negative for any significant pathology. Notably, both biopsies taken after rejection therapies implemented which may have reduced areas of graft damage.    - DSAs remain negative;  however, patient may have non-HLA antibodies present. 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   - Negative CAV & CAD via LHC on 2/18/24   - Completed methylpred steroid pulse w/ 1g Q24 x 3 days (2/16-2/18) and prednisone taper   - Thymoglobulin doses: 2/18 & 2/19   - IVIG + PLEX Session: 2/18, 2/20, 3/7, 3/11, 3/13  - Graft function slightly worse after transition to impella 5.5 on 3/1. IABP removed 3/1/24.    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  #Impella 5.5 support 3/1/24  - Maintain goal MAP 70-90  - Continue slow wean of impella support as clinically indicated with evidence of adequate end organ perfusion  - Continue Impella P level @ 5, wean per clinical picture / hemodynamics   - C/w Sodium bicarb purge for Impella   - Continue milrinone 0.25mcg/kg/min  - Continue afterload reduction with PO hydralazine 100mg q8h, PO isosorbide 40mg every 8 hours  - Hold home amlodipine  - Continue ASA and Crestor daily   - Cath lab order for groin SGC 3/12 - PENDING   - Thursday limited ECHO to assess LV function (ordered)    #Advanced therapy evaluation  - Presented to committee 3/12/2024 - concern for end organ failure (possible heart / kidney)   - Not a candidate for transplant at this time per committee discussion 03/12/24     PULM:   #Acute respiratory failure 2/21-2/24 (resolved)   - Remains on RA   - CXR as needed  - Maintain SPO2 > 92%     GI:   #History of gastric bypass   #History of MMF colitis  #Acute transaminitis - Resolved   - Continue home PPI, calcitriol 0.5mg daily, multivitamin, calcium carbonate 1,250mg BID  - Continue renal diet  - Continue miralax BID & senna-colace BID  - Last BM 3/12     :   #Acute renal failure 2/2 to cardiorenal syndrome   #IHD T/Th/Sa  - Baseline Cr 1.2-1.3.   - RFP daily and PRN  - Bladder scan daily  - CVVH stopped on 2/27/24, receiving IHD as needed   - IHD today  3/12  - Patient will need 2 more weeks of HD before being deemed a candidate for kidney transplant per transplant nephrology   - Transplant nephrology following closely for IHD schedule     ENDO:   #T2DM  - Steroid induced hyperglycemia acceptable glycemic control on SSI.   - Maintain BG <180 with hypoglycemia protocol  - Continue SSI     #Hypothyroidism  - TSH 14.87, T4 1.37, T3 60  - Decreased Synthroid to 200 mcg daily on 3/11, redraw TSH on 3/18 (ordered)  - Endocrine following      HEME:   #Acute DVT L IJ vein and SVT left cephalic vein and right cephalic vein   #Iron defecency anemia  #Acute blood loss anemia   #Multiple transfusions   - CBC daily and PRN   - Will initiate Heparin gtt post PA catheter insertion (Discussed w/ Dr. Witt and Dr. Melo)  - Upper and low DVT US 3/12 - show no new findings from 3/3 scan   - Continue SCDs for DVT prophylaxis  - Continue Aranesp every 2 weeks  - Last type and screen: 3/8, update tonight 3/12     ID:   #No acute issues  - Received Zosyn and Vancomycin on 2/15 in ED  - Blood cultures from 2/15 negative  - Trend temp q4h  - Afebrile and nontoxic     PHYSICAL AND OCCUPATIONAL THERAPY: ordered and following     LINES:  PIVs  RIJ trialysis catheter 3/5  R Axillary Impella 3/1    DVT: SCDs, subcutaneous heparin   VAP BUNDLE: N/A  ULCER PPX: PPI  GLYCEMIC CONTROL: SSI  BOWEL CARE: Patti-colace BID, miralax BID  INDWELLING CATHETER: N/A  NUTRITION: NPO Diet; Effective now      EMERGENCY CONTACT: Extended Emergency Contact Information  Primary Emergency Contact: SAHIL DIMAS  Address: [address] [place] [address] of [facility]  Home Phone: [phone]  Mobile Phone: [phone]  Relation: Mother  FAMILY UPDATE: given at bedside  CODE STATUS: Full Code  DISPO: remain in HFICU    Patient seen and assessed with Dr. Melo     _________________________________________________  Anuj Pierre, APRN-CNP

## 2024-03-13 NOTE — PROGRESS NOTES
"Nutrition Follow Up Assessment:   Nutrition Assessment         Patient is a 34 y.o. female presenting initially with cardiogenic shock on 2/15.  Required ECMO cannulation on 2/19.  Has since been decannulated.  She has an Impella in place for support.  She is now on iHD and Plex.  Per committee meeting yesterday, she is not currently a candidate for retransplantation.  She has a history of heart transplant in March 2022 with rejection noted in Nov 2022.  Also has a history of gastric bypass in 2017.    Pt had been on a renal and cardiac diet upon visit this afternoon.  Mom was with patient.  Mom initially asking for a diet-specific menu to make ordering easier.  RDN asked team if patient can just have a Regular diet and they were agreeable.  Pt reports PO intake was poor but she attributes this to diet restrictions.  Pt has been on various restrictions over the course of her hospital stay.  She is now agreeable to supplements with mom's encouragement-- Ensure Clear.  RDN also provided patient with extended stay menu to allow for more variety/options.  Reports occasional nausea.  Did not ask about bowel movements but had been constipated at last RDN visit last week.  Last BM was yesterday.        Anthropometrics:  Height: 154.9 cm (5' 0.98\")   Weight: 99.3 kg (218 lb 14.7 oz)   BMI (Calculated): 41.39  IBW/kg (Dietitian Calculated): 47.7 kg  Percent of IBW: 191 %  Adjusted Body Weight (kg): 59 kg    Weight History:     3/13/24: 99.3kg  3/4/24: 90.6kg  2/26/24: 92.8kg  2/21/24: 99.6kg-- admit on 2/15    Weight Change %:       Nutrition Focused Physical Exam Findings:  Defer    Subcutaneous Fat Loss:      Muscle Wasting:     Edema:  Edema: none  Physical Findings:  Skin: Positive (L groin wound, R upper leg wound, midsternal wound)    Nutrition Significant Labs:  BMP Trend:   Results from last 7 days   Lab Units 03/13/24  0558 03/12/24  0439 03/11/24  0014 03/10/24  1214   GLUCOSE mg/dL 140* 129* 112* 154*   CALCIUM " mg/dL 9.0 10.4 8.6 9.0   SODIUM mmol/L 133* 130* 130* 132*   POTASSIUM mmol/L 3.8 4.8 3.7 4.1   CO2 mmol/L 28 24 25 25   CHLORIDE mmol/L 93* 89* 91* 91*   BUN mg/dL 26* 43* 31* 28*   CREATININE mg/dL 4.37* 5.77* 4.57* 3.61*    , BG POCT trend:   Results from last 7 days   Lab Units 03/13/24  1134 03/13/24  0802 03/12/24 2019 03/12/24  1628 03/12/24  1623   POCT GLUCOSE mg/dL 155* 150* 171* 214* 213*    , Renal Lab Trend:   Results from last 7 days   Lab Units 03/13/24  0558 03/12/24  0439 03/11/24  0014 03/10/24  1214   POTASSIUM mmol/L 3.8 4.8 3.7 4.1   PHOSPHORUS mg/dL 3.6 5.5* 3.4 3.5   SODIUM mmol/L 133* 130* 130* 132*   MAGNESIUM mg/dL 2.09 2.11  --  2.23   EGFR mL/min/1.73m*2 13* 9* 12* 16*   BUN mg/dL 26* 43* 31* 28*   CREATININE mg/dL 4.37* 5.77* 4.57* 3.61*    , Vit D:   Lab Results   Component Value Date    VITD25 92 01/04/2023        Nutrition Specific Medications:  Scheduled medications  acetaminophen, 650 mg, oral, Once  aspirin, 81 mg, oral, Daily  calcitriol, 0.5 mcg, oral, Daily  calcium gluconate, 2 g, intravenous, Once  darbepoetin floyd, 100 mcg, subcutaneous, q14 days  diphenhydrAMINE, 25 mg, oral, Once  ferrous sulfate (325 mg ferrous sulfate), 65 mg of iron, oral, Daily with breakfast  heparin, 4,000 Units, intravenous, Once  hydrALAZINE, 100 mg, oral, q8h  immune globulin (human), 100 g, intravenous, Once  insulin lispro, 0-10 Units, subcutaneous, Before meals & nightly  isosorbide dinitrate, 40 mg, oral, q8h  levothyroxine, 200 mcg, oral, Daily  melatonin, 10 mg, oral, Nightly  multivitamin with minerals, 1 tablet, oral, Daily  nystatin, 5 mL, Swish & Swallow, q6h  pantoprazole, 40 mg, oral, Daily before breakfast  perflutren lipid microspheres, 0.5-10 mL of dilution, intravenous, Once in imaging  [START ON 3/14/2024] perflutren lipid microspheres, 0.5-10 mL of dilution, intravenous, Once in imaging  perflutren protein A microsphere, 0.5 mL, intravenous, Once in imaging  perflutren protein A  microsphere, 0.5 mL, intravenous, Once in imaging  polyethylene glycol, 17 g, oral, BID  predniSONE, 10 mg, oral, Daily  rosuvastatin, 40 mg, oral, Nightly  sennosides-docusate sodium, 2 tablet, oral, BID  sirolimus, 3 mg, oral, Daily  [Held by provider] sulfamethoxazole-trimethoprim, 80 mg of trimethoprim, oral, Daily  sulfur hexafluoride microsphr, 2 mL, intravenous, Once in imaging  sulfur hexafluoride microsphr, 2 mL, intravenous, Once in imaging  tacrolimus, 2 mg, oral, q12h TRICIA  traZODone, 50 mg, oral, Nightly  valGANciclovir, 450 mg, oral, q48h      Continuous medications  heparin, 0-4,000 Units/hr, Last Rate: 1,000 Units/hr (03/13/24 1457)  lactated Ringer's, 10 mL/hr, Last Rate: 10 mL/hr (03/13/24 1457)  milrinone, 0.25 mcg/kg/min (Dosing Weight), Last Rate: 0.25 mcg/kg/min (03/13/24 1456)  sodium bicarbonate infusion Impella Purge 25 mEq/1000 mL D5W, 10 mL/hr, Last Rate: 10 mL/hr (03/13/24 0634)      PRN medications  PRN medications: acetaminophen, calcium carbonate, dextrose, dextrose **OR** glucagon, diphenhydrAMINE, glucagon, heparin, artificial tears, microfibrllar collagen, mupirocin, ondansetron, oxyCODONE, sodium chloride     I/O:   Last BM Date: 03/13/24; Stool Appearance: Formed (03/12/24 2000)        Dietary Orders (From admission, onward)       Start     Ordered    03/13/24 1434  Snacks  Until discontinued        Question:  Deliver with  Answer:    Comment:  please allow patient to order from extended stay menu    03/13/24 1434    03/13/24 1428  Oral nutritional supplements  Until discontinued        Question Answer Comment   Deliver with Breakfast berry flavor; send BID or when pt requests   Deliver with Dinner    Select supplement: Ensure Clear        03/13/24 1428    03/13/24 1405  Adult diet Regular  Diet effective now        Question:  Diet type  Answer:  Regular    03/13/24 1404                     Estimated Needs:   Total Energy Estimated Needs (kCal): 1800 kCal  Method for Estimating  Needs: 30 kcals/kg of adjusted BW ; REE using MSJ = 1562 kcals/d  Total Protein Estimated Needs (g):  (75-90)  Method for Estimating Needs: ~1.3-1.5 gm/kg of adjusted BW  Total Fluid Estimated Needs (mL):  (per team)           Nutrition Diagnosis  Patient has Nutrition Diagnosis: Yes  Diagnosis Status (1): Ongoing  Nutrition Diagnosis 1: Increased nutrient needs (protein)  Related to (1): altered metabolism for healing/recovery from critical illness  As Evidenced by (1): s/p ECMO & CVVH >>  3/06) transitioned to iHD, s/p Impella    Diagnosis Status (3): New  Nutrition Diagnosis 3: Inadequate oral intake  Related to (3): acute events (nose bleed, nausea/vomiting, constipation)  As Evidenced by (3): reduced ability to consume PO past few days  Additional Assessment Information (3): accepting of supplements at this time       Nutrition Interventions/Recommendations         Nutrition Prescription:   Continue Regular diet for patient.    Would avoid diet restrictions in setting of inadequate PO intake this hospital stay.    As she has a history of gastric bypass, she needs the following vitamin and mineral supplements for life:   two chewable multi-vitamin and mineral supplements daily    500mcgs of B12 daily    500-600mgs of a calcium/Vitamin D supplement daily          Nutrition Interventions:   Food and/or Nutrient Delivery Interventions  Interventions: Medical food supplement  Goal: Ensure Clear= 250kcals, 8 grams protein each         Nutrition Education:   Not appropriate at this time.       Nutrition Monitoring and Evaluation   Food/Nutrient Related History Monitoring  Monitoring and Evaluation Plan: Energy intake      Time Spent/Follow-up Reminder:   Time Spent (min): 60 minutes  Last Date of Nutrition Visit: 03/13/24  Nutrition Follow-Up Needed?: Dietitian to reassess per policy  Follow up Comment: poor PO/supps

## 2024-03-13 NOTE — PROGRESS NOTES
Charla MONTELONGO Yimi Bowles [id number]     Procedure type: Plasma Exchange    Procedure completed Without complications.    Attending notified of completion status. See flow sheet(s) for additional details.  Post-Vitals listed below.    Post-procedure vitals:       03/13/24 1320   Vital Signs   Temp 37.5 °C (99.5 °F)   Heart Rate (!) 138   Resp (!) 38   /89           Ramil Reyes, RN

## 2024-03-13 NOTE — PROGRESS NOTES
Heart failure ICU attending note  33-year-old female with a medical history of stage D HFrEF status post heart transplantation March 2022 with postop course complicated by symptomatic to our ACR November 2022.  Patient presented in the setting of nausea/vomiting and subtherapeutic sirolimus/tacrolimus levels with low ejection fraction/new LVH and cardiogenic shock.  In terms of immunosuppressive therapies she received pulse steroids, has been maintained on tacrolimus/sirolimus, 2 sessions of IVIG/plasmapheresis on February 18 and 20, 2024; and 2 doses of Thymoglobulin 2/18 and 2/19.  Following an endomyocardial biopsy 2/20/2024 which was negative for rejection (in the setting of negative DSAs) - the Thymoglobulin/IVIG/plasmapheresis sessions were stopped.   IVIG/plasmapheresis resumed March 5, 2024.  Hemodynamically had been supported with ECMO initially, which was then weaned off.  Unfortunately, she required hemodynamic support again, and Impella 5 was placed.  She was eventually transferred to heart failure ICU with Impella 5.  Symptomatically doing well, but continues to have tachycardia.  Symptom wise tolerating wean to Impella P5 reasonably well.  Also tolerating Plex plus IVIG, as well as hemodialysis well.  Cardiac function is certainly no worse, and there appears to be decreased left ventricular hypertrophy on echocardiogram indicating of decreased edema.  Unfortunately, renal function appears to be heading the wrong way.  Plan.  Case was discussed at the transplant meeting.  Given concerns about unknown cause of LV dysfunction, possibly rejection, lack of tolerance of CellCept as well as azathioprine, and kidney dysfunction, she was felt to be not a candidate for cardiac transplantation at this time.  Similarly, ventricular assist device, and face of active immunosuppression was felt to be less than ideal, but was not completely ruled out.  We will request input from renal transplant team as well.  The  etiology of her cardiac dysfunction is by no means clear.  While her presentation is very suspicious for rejection, her biopsies have essentially been negative as have been a DSA's.  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    Plan  Completed 5 cycles of plasmapheresis with IVIG today.  Will hold on further cycles for now.  Austin-Estevan catheter placed, has reasonable cardiac outputs, with high filling pressures.  Will increase Impella support to help unload, while dialyzing.  Repeat echocardiogram tomorrow to assess for LV function, LV wall thickness at the surrogate for myocardial edema.  Depending on echo, and hemodynamics will rechallenge with Myfortic to see if a second agent is tolerated by her, as this will impact her transplant candidacy.  I remain concerned about her kidneys.  Will attempt decongestion and improve forward flow with Impella as a means to help  out.  Vascular medicine input greatly appreciated.  So far tolerating heparin.  Will keep a close eye for bleeding risk which is nontrivial.    I spent time talking with the patient and her family regarding the plan.  They expressed understanding.    This critically ill patient continues to be at-risk for clinically significant deterioration / failure due to the above mentioned dysfunctional, unstable organ systems.  I have personally identified and managed all complex critical care issues to prevent aforementioned clinical deterioration.  Critical care time is spent at bedside and/or the immediate area and has included, but is not limited to, the review of diagnostic tests, labs, radiographs, serial assessments of hemodynamics, respiratory status, ventilatory management, and family updates.  Time spent in procedures and teaching are reported separately.    Critical care time:55minutes

## 2024-03-13 NOTE — POST-PROCEDURE NOTE
This is a 34 y.o. female  with antibody-mediated rejection of heart graft who underwent therapeutic plasma exchange (TPE) with  66.6% albumin and 33.4% plasma as replacement fluid.     I saw and evaluated the patient during the TPE.  The patient was resting in bed.  The vascular access functioned well.     Pre-procedure labs:  WBC   Date/Time Value Ref Range Status   03/13/2024 05:58 AM 7.8 4.4 - 11.3 x10*3/uL Final     Hemoglobin   Date/Time Value Ref Range Status   03/13/2024 05:58 AM 7.5 (L) 12.0 - 16.0 g/dL Final     Hematocrit   Date/Time Value Ref Range Status   03/13/2024 05:58 AM 22.9 (L) 36.0 - 46.0 % Final     Platelets   Date/Time Value Ref Range Status   03/13/2024 05:58  (L) 150 - 450 x10*3/uL Final        POCT Calcium, Ionized   Date/Time Value Ref Range Status   03/13/2024 05:58 AM 1.10 1.1 - 1.33 mmol/L Final     Comment:     The performance characteristics of ionized calcium tested  in heparinized plasma or serum have been validated by the  individual  laboratory site where testing is performed.   Testing on heparinized plasma or serum is not approved by   the FDA; however, such approval is not necessary.        Protime   Date/Time Value Ref Range Status   03/13/2024 05:58 AM 15.0 (H) 9.8 - 12.8 seconds Final     INR   Date/Time Value Ref Range Status   03/13/2024 05:58 AM 1.3 (H) 0.9 - 1.1 Final     aPTT   Date/Time Value Ref Range Status   03/13/2024 05:58 AM 61 (H) 27 - 38 seconds Final     Fibrinogen   Date/Time Value Ref Range Status   03/13/2024 05:58  200 - 400 mg/dL Final        Pre-procedure vital signs at 10:25:  T: 36.9 C, HR: 135, RR: 21, /94  Pulse oximetry: 96% on room air    Post-procedure vital signs at 13:20:  T: 37.5 C, HR: 138, RR: 38, BP: 116/89       Outcome: TPE completed without complications .   Adverse Reaction: No    Assessment and Plan:  34 y.o. female with  with antibody-mediated rejection of heart graft  who underwent TPE #5/5.  Draw post-procedure  labs  Vascular access to be managed by clinical team.  The series of TPE have been completed. No further TPE procedures are scheduled at this time.

## 2024-03-13 NOTE — POST-PROCEDURE NOTE
Physician Transition of Care Summary  Invasive Cardiovascular Lab    Procedure Date: 3/13/2024  Attending:    * Sourav Orellana - Primary  Resident/Fellow/Other Assistant: Surgeon(s) and Role:     * Tequila Harris MD - Fellow    Indications:   Pre-op Diagnosis     * Abnormal findings on diagnostic imaging of heart and coronary circulation [R93.1]     * Acute decompensated heart failure (CMS/HCC) [I50.9]    Post-procedure diagnosis:   Post-op Diagnosis     * Abnormal findings on diagnostic imaging of heart and coronary circulation [R93.1]     * Acute decompensated heart failure (CMS/HCC) [I50.9]    Procedure(s):   Right Heart Cath  99824 - SD RIGHT HEART CATH O2 SATURATION & CARDIAC OUTPUT        Procedure Findings:   RA: 16  RV: 43/1  PA: 43/12  PCWP: 23  PA-SAT: 47%  CO: 5.14  CI: 2.64    On P5 of Impella 5.5      Description of the Procedure:   Right femoral vein leave in SWAN    Complications:   NA    Stents/Implants:   Cardiovascular Implants       Pacemaker    Lead, Pacing, Cardiac, Temporary, W/Suture, Flexon, 2-0, 24 Case [id number] - Implanted        Model/Cat number: [id number] : [employer]    Lot number: [id number] Implant Date: 3/31/2022    Description: Converted from [facility]. Please see archived information for full log information.        As of 9/1/2023       Status: Unknown                      Lead, Pacing, Myocardial, Bipolar, Temporary Case [id number] - Implanted        Model/Cat number: [id number] : [employer]    Implant Date: 3/31/2022 Description: Converted from [facility]. Please see archived information for full log information.      As of 9/1/2023       Status: Unknown                      Other Cardiac Implant    Delivery System, Cardiomems Pa Sensor, Includes [id number], [id number]/22/2020 - Implanted        Inventory item: DELIVERY SYSTEM, CARDIOMEMS PA SENSOR, INCLUDES [id number], [id number] Model/Cat number: CM PATIENT SYSTEM    Serial number: [id number] :  [employer]    Lot number: [id number] / [id number]        As of 11/13/2023       Status: Unknown                As of 5/22/2020       Status: Implanted                      Device, Impella 5.5 S2, W/Smartassist Set - [id number] - Implanted        Inventory item: DEVICE, IMPELLA 5.5 S2, W/SMARTASSIST SET Model/Cat number: [id number]    Serial number: [id number] : [employer]      As of 3/1/2024       Status: Implanted                              Anticoagulation/Antiplatelet Plan:   NA    Estimated Blood Loss:   20 mL    Anesthesia: Moderate Sedation Anesthesia Staff: No anesthesia staff entered.    Any Specimen(s) Removed:   No specimens collected during this procedure.    Disposition:   [facility]      Electronically signed by: Tequila Harris MD, 3/13/2024 9:54 AM

## 2024-03-13 NOTE — PROGRESS NOTES
Occupational Therapy                 Therapy Communication Note    Patient Name: Charla Bowles  MRN: [id number]  Today's Date: 3/13/2024     Discipline: Occupational Therapy    Missed Visit Reason: Missed Visit Reason:  (patient currently leaving unit for heart cath; will attempt OT next visit as appropriate)    Missed Time: Attempt    Comment:  July Araujo OTR/L  Inpatient Occupational Therapist   Rehab Office: [phone]

## 2024-03-13 NOTE — PROGRESS NOTES
[facility]  HFICU PROGRESS NOTE    Charla Bowles/[id number]    Admit Date: 2/15/2024  Hospital Length of Stay: 27   ICU Length of Stay: 2d 18h   Primary Service: HFICU  Primary HF Cardiologist: Dr. Cornell  Referring: Dr Cornell     INTERVAL EVENTS / PERTINENT ROS:   No acute events overnight. Patient received hemodialysis session yesterday. Remains hemodynamically supported on impella P5 and milrinone 0.25 mcg/kg/min. HR remains 130s. MAPs remain in the 80s. No complaints this morning other than some nausea and general pain.     Plan:  - Cath lab for RHC with leave in [place] today  - Digoxin 125 mcg x1 today, send level tomorrow 3/14  - Increase Impella support to P-6  - Limited echo to assess LV function 3/14  - Last session of IVIG/PLEX today 3/13     MEDICATIONS  Infusions:  heparin, Last Rate: Stopped (03/13/24 0800)  lactated Ringer's, Last Rate: 10 mL/hr (03/13/24 1130)  milrinone, Last Rate: 0.25 mcg/kg/min (03/13/24 0800)  sodium bicarbonate infusion Impella Purge 25 mEq/1000 mL D5W, Last Rate: 10 mL/hr (03/13/24 0634)      Scheduled:  aspirin, 81 mg, Daily  calcitriol, 0.5 mcg, Daily  calcium gluconate, 2 g, Once  darbepoetin floyd, 100 mcg, q14 days  [START ON 3/14/2024] digoxin, 62.5 mcg, Daily  ferrous sulfate (325 mg ferrous sulfate), 65 mg of iron, Daily with breakfast  heparin, 4,000 Units, Once  hydrALAZINE, 100 mg, q8h  insulin lispro, 0-10 Units, Before meals & nightly  isosorbide dinitrate, 40 mg, q8h  levothyroxine, 200 mcg, Daily  melatonin, 10 mg, Nightly  multivitamin with minerals, 1 tablet, Daily  nystatin, 5 mL, q6h  pantoprazole, 40 mg, Daily before breakfast  perflutren lipid microspheres, 0.5-10 mL of dilution, Once in imaging  [START ON 3/14/2024] perflutren lipid microspheres, 0.5-10 mL of dilution, Once in imaging  perflutren protein A microsphere, 0.5 mL, Once in imaging  perflutren protein A microsphere, 0.5 mL, Once in imaging  polyethylene  "glycol, 17 g, BID  predniSONE, 10 mg, Daily  rosuvastatin, 40 mg, Nightly  sennosides-docusate sodium, 2 tablet, BID  sirolimus, 3 mg, Daily  [Held by provider] sulfamethoxazole-trimethoprim, 80 mg of trimethoprim, Daily  sulfur hexafluoride microsphr, 2 mL, Once in imaging  sulfur hexafluoride microsphr, 2 mL, Once in imaging  tacrolimus, 2 mg, q12h TRICIA  traZODone, 50 mg, Nightly  valGANciclovir, 450 mg, q48h      PRN:  acetaminophen, 975 mg, q8h PRN  calcium carbonate, 1,500 mg, q5 min PRN  dextrose, 25 g, q15 min PRN  dextrose, 25 g, q15 min PRN   Or  glucagon, 1 mg, q15 min PRN  diphenhydrAMINE, 25 mg, q5 min PRN  glucagon, 1 mg, q15 min PRN  heparin, 2,000-4,000 Units, q4h PRN  artificial tears, 2 drop, PRN  microfibrllar collagen, , PRN  mupirocin, , BID PRN  ondansetron, 4 mg, q4h PRN  oxyCODONE, 2.5 mg, q4h PRN  sodium chloride, 1 spray, 4x daily PRN        Impella:      Most Recent Range Past 24hrs   Performance Level 5 P Level  Min: 5   Min taken time: 03/13/24 0800  Max: 5   Max taken time: 03/13/24 0800   Flow (L/min) 2.7 Flow (L/min)  Min: 2.5   Min taken time: 03/13/24 0600  Max: 3.1   Max taken time: 03/12/24 1500   Motor Current 287/203 Motor Current  Min: 278/206   Min taken time: 03/13/24 0100  Max: 295/202   Max taken time: 03/12/24 1400   Placement Signal Yes  Placement OK could not be evaluated. This SmartLink does not work with rows of the type: Custom List   Purge (mmHg) 456 Purge Pressure (mmHg)  Min: 443   Min taken time: 03/13/24 0700  Max: 547   Max taken time: 03/13/24 0100   Purge rate (mL/hr) 10.8 Purge Rate (mL/hr)  Min: 10.7   Min taken time: 03/13/24 0400  Max: 11.3   Max taken time: 03/13/24 0500       PHYSICAL EXAM:   Visit Vitals  /89   Pulse (!) 138   Temp 37.5 °C (99.5 °F)   Resp (!) 38   Ht 1.549 m (5' 0.98\")   Wt 99.3 kg (218 lb 14.7 oz)   SpO2 97%   BMI 41.39 kg/m²   OB Status Hysterectomy   Smoking Status Never   BSA 2.07 m²     Wt Readings from Last 5 Encounters: "   03/13/24 99.3 kg (218 lb 14.7 oz)   12/07/23 92.1 kg (203 lb)   12/01/23 93 kg (205 lb)   11/29/23 92.9 kg (204 lb 12.8 oz)   11/09/23 91.3 kg (201 lb 3.2 oz)     INTAKE/OUTPUT:  I/O last 3 completed shifts:  In: 2041.5 (20.5 mL/kg) [I.V.:1141.5 (11.5 mL/kg); Other:800; IV Piggyback:100]  Out: 1106 (11.1 mL/kg) [Other:1106]  Dosing Weight: 99.6 kg        Physical Exam  Constitutional:       General: She is not in acute distress.     Appearance: Normal appearance. She is not ill-appearing.   HENT:      Head: Normocephalic.      Mouth/Throat:      Mouth: Mucous membranes are moist.   Eyes:      Extraocular Movements: Extraocular movements intact.      Pupils: Pupils are equal, round, and reactive to light.   Neck:      Comments: RIJ dialysis line present   Cardiovascular:      Rate and Rhythm: Regular rhythm. Tachycardia present.      Pulses: Normal pulses.      Heart sounds: Normal heart sounds.   Pulmonary:      Effort: Pulmonary effort is normal. No respiratory distress.      Breath sounds: Normal breath sounds.   Abdominal:      General: Bowel sounds are normal.      Palpations: Abdomen is soft.   Musculoskeletal:         General: Normal range of motion.      Cervical back: Normal range of motion.      Right lower leg: Edema present.      Left lower leg: Edema present.   Skin:     General: Skin is warm.      Capillary Refill: Capillary refill takes 2 to 3 seconds.   Neurological:      General: No focal deficit present.      Mental Status: She is alert and oriented to person, place, and time.       DATA:  CMP:  Results from last 7 days   Lab Units 03/13/24  0558 03/12/24  0439 03/11/24  0014 03/10/24  1214 03/10/24  0552 03/09/24  1641 03/09/24  0555 03/08/24  1247 03/08/24  0549 03/07/24  1402 03/07/24  0541 03/06/24  1203   SODIUM mmol/L 133* 130* 130* 132* 132* 135* 132* 136  --  133* 133* 132*   POTASSIUM mmol/L 3.8 4.8 3.7 4.1 3.7 3.9 3.7 4.0  --  4.4 4.6 4.4   CHLORIDE mmol/L 93* 89* 91* 91* 93* 95* 91* 93*   --  91* 92* 92*   CO2 mmol/L 28 24 25 25 27 28 29 29  --  27 25 26   ANION GAP mmol/L 16 22* 18 20 16 16 16 18  --  19 21* 18   BUN mg/dL 26* 43* 31* 28* 25* 19 47* 40*  --  79* 80* 67*   CREATININE mg/dL 4.37* 5.77* 4.57* 3.61* 3.40* 2.41* 4.48* 3.55*  --  5.17* 4.98* 3.83*   EGFR mL/min/1.73m*2 13* 9* 12* 16* 18* 27* 13* 17*  --  11* 11* 15*   MAGNESIUM mg/dL 2.09 2.11  --  2.23 1.96 1.96 1.95 1.97  --  2.26 2.33 2.27   ALBUMIN g/dL 3.4 3.6  3.6 3.8 4.1 3.7 4.0 3.5 3.3* 3.6 3.3* 3.6 3.8   ALT U/L 33 24 9  --  9  --  7  --  9  --  6*  --    AST U/L 89* 52* 44*  --  45*  --  36  --  38  --  35  --    BILIRUBIN TOTAL mg/dL 1.3* 0.8 0.9  --  0.9  --  0.7  --  0.8  --  0.7  --      CBC:  Results from last 7 days   Lab Units 03/13/24  0558 03/12/24  0439 03/11/24  1314 03/11/24  0014 03/10/24  1214 03/10/24  0552 03/09/24  1641 03/09/24  0555   WBC AUTO x10*3/uL 7.8 7.4 8.4 8.8 10.4 8.8 9.4 8.1   HEMOGLOBIN g/dL 7.5* 7.9* 7.5* 7.5* 8.1* 6.7* 7.2* 6.7*   HEMATOCRIT % 22.9* 23.9* 23.3* 21.3* 23.4* 19.9* 20.8* 19.3*   PLATELETS AUTO x10*3/uL 144* 169 152 132* 127* 118* 126* 160   MCV fL 90 88 94 85 88 89 87 87     COAG:   Results from last 7 days   Lab Units 03/13/24  0558 03/11/24  0014 03/10/24  0552 03/09/24  1641 03/09/24  0555 03/08/24  1247 03/08/24  0549 03/07/24  1402   INR  1.3* 1.1 1.2* 1.1 1.2* 1.3* 1.3* 1.3*     ABO:   ABO TYPE   Date Value Ref Range Status   03/11/2024 O  Final     HEME/ENDO:  Results from last 7 days   Lab Units 03/07/24  1402   TSH mIU/L 14.87*   HEMOGLOBIN A1C % 5.7*      CARDIAC:   Results from last 7 days   Lab Units 03/13/24  0558 03/12/24  0439 03/11/24  0014 03/10/24  0552 03/09/24  0555 03/08/24  0549 03/07/24  1402 03/07/24  0541   LD U/L 659* 424* 571* 557* 420* 496*  --  566*   BNP pg/mL  --   --   --   --   --   --  4,683*  --        ASSESSMENT AND PLAN:   Charla Bowles is a 32 y/o F with PMHx of heart transplant in March 2022 with postoperative course c/b upper  extremity/internal jugular DVTs, and 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 She was admitted to HFICU on 2/15 with concern for cardiogenic shock 2/2 allograft rejection and decompensated heart failure with multiorgan dysfunction including significant elevation of liver enzymes and nonoliguric acute kidney injury. Endomyocardial biopsy on 2/16 revealed mild ACR with +CD4s and negative HLAs, however multisystem organ failure persisted with increased inotropic requirements. IABP placed on 2/18. Transferred to CTICU on 2/19 for [facility] ECMO cannulation with Dr. Marina for persistent multi-organ system dysfunction. Intubated 2/21 for respiratory failure 2/2 pulmonary edema, extubated 2/24. ECMO de-cannulated 2/29/24 but had worsening HIRAM and overall declined clinical status and IABP was transitioned to R axillary impella 5.5 on 3/1. Now tolerating iHD with plan for completion of PLEX/IVIG 3/13. Transferred from CTICU to HFICU on 3/10 for further management. Continued on milrinone gtt @ 0.25 mcg/kg/min and impella support decreased to P-level 5 on 3/11.    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)  Toxo: -/-   Hep C: -/-     Rejection/Prophylaxis (transplant):  - Steroids: 10 mg PO prednisone daily  - Tacrolimus: 2 mg PO @ 0630 & 2 mg PO @ 1830 w/ daily levels drawn @ 0600  - Sirolimus: 3 mg PO daily w/ daily levels drawn at 0600 (last increase  3/10)  - Tacrolimus goal troughs: 3-5, daily level 3/13: 5.5  - Sirolimus goal troughs: 7-9, daily level 3/13: 9  - Combined sirolimus/tacrolimus goal of 10-12  - Antifungals: nystatin oral suspension 5 mls q6  - Antivirals: Valcyte 450 mg q48 due to low grade viremia   - Anti PCP & Toxoplasmosis: Bactrim SS daily --> holding due to thrombocytopenia (2/23)     Last cardiac biopsy: 2/16/24 with ACR1 and no AMR  Last HLA (2/16/24): negative for DSAs   Last RHC (3/13/24): RA: 16, RV: 43/1, PA: 43/12, PCWP: 23, PA-SAT: 47%, CO: 5.14, CI: 2.64 on P5 of Impella 5.5 and milrinone 0.25 mcg/kg/min   Last LHC (2/18/24): negative for CAV and CAD   Last TTE/BOB (3/1/24): shows LVEF to 30% and mild RV dysfunction (intra-op impella 5.5 insert)  Osteopenia/osteoporosis prophylaxis: Vitamin D3 and calcium supplements  Peptic/gastric ulcer prophylaxis: Pantoprazole 40 mg daily   CAV Prophylaxis: Aspirin 81mg daily & pravastatin daily     - Unclear cause of acute severe graft dysfunction. Most likely smoldering ACR vs. AMR; however, 2x allograft biopsies on 2/16 & 2/20 remain negative for any significant pathology. Notably, both biopsies taken after rejection therapies implemented which may have reduced areas of graft damage.    - DSAs remain negative; however, patient may have non-HLA antibodies present. 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   - Negative CAV & CAD via LHC on 2/18/24   - Completed methylpred steroid pulse w/ 1g q24 x 3 days (2/16-2/18) and prednisone taper   - Thymoglobulin doses: 2/18 & 2/19   - IVIG + PLEX Session: 2/18, 2/20, 3/7, 3/11, 3/13  - Graft function slightly worse after transition to impella 5.5 on 3/1. 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  #Impella 5.5 support 3/1/24  RHC (3/13/24): RA: 16, RV: 43/1, PA: 43/12, PCWP: 23, PA-SAT: 47%, CO: 5.14, CI: 2.64 on P5 of  Impella 5.5, milrinone 0.25 mcg/kg/min, hydralazine 100 mg q8, isordil 40 mg q8  Opening SGC #s (3/13): BP 94/71 (79), PAP 42/30 (35), CVP 13, , CO/CI (kyra) 5.46/2.80, SVO2 56% on P5 of Impella 5.5, milrinone 0.25 mcg/kg/min, hydralazine 100 mg q8, isordil 40 mg q8  - Maintain goal MAP 70-90  - Continue slow wean of impella support as clinically indicated with evidence of adequate end organ perfusion  - Increase Impella P level to 6, wean per clinical picture / hemodynamics   - Continue sodium bicarb purge for Impella   - Continue milrinone 0.25 mcg/kg/min  - Continue afterload reduction with PO hydralazine 100 mg q8 + PO isosorbide 40 mg q8  - Continue ASA   - Continue rosuvastatin 40 mg daily   - Digoxin 125 mcg PO x1, send digoxin level 3/14  - Diuresis as needed  - SGC #s q6, daily CXR   - Final PLEX/ IVIG session today 3/13  - Limited ECHO 3/14 to assess LV function (ordered)    #Advanced therapy evaluation  - Presented to committee 3/12/2024 - concern for end organ failure (possible heart / kidney)   - Not a candidate for transplant at this time per committee discussion 03/12/24     PULM:   #Acute Hypoxic Respiratory Failure 2/2 pulmonary edema (resolved)  ETT (2/21-2/24)  Remains on RA   - Maintain SpO2 > 92%     GI:   #Hx of gastric bypass   #Hx of MMF colitis  #Acute transaminitis (improving)  - Continue home PPI, calcitriol 0.5 mg daily, multivitamin  - Continue miralax BID & christopher-colace BID  - Trend LFTs daily      :   #Acute Renal Failure 2/2 to cardiorenal syndrome (on IHD)  Baseline Cr 1.2-1.3  CVVH stopped 2/27/24  - RFP daily and PRN  - iHD T, Th, Sat  - Patient will need 2 more weeks of HD before being deemed a candidate for kidney transplant per transplant nephrology   - Transplant nephrology following closely for HD schedule     ENDO:   #T2DM  Steroid induced hyperglycemia acceptable glycemic control on SSI  - Maintain BG <180 with hypoglycemia protocol  - Continue SSI      #Hypothyroidism  TSH (3/7): 14.87, T4 1.37, T3 60  - Continue synthroid 200 mcg daily, redraw TSH on 3/18 (ordered)  - Endocrine following, appreciate assistance      HEME:   #Acute DVT LIJ and SVT left cephalic vein and right cephalic vein   #Iron deficiency anemia  #Acute blood loss anemia   #Multiple transfusions   UE and LE Venous duplex (3/6/24): right acute occlusive superficial venous thrombosis visualized in the mid cephalic and acute occlusive superficial venous thrombosis visualized in the distal cephalic veins, left acute non-occlusive deep vein thrombosis visualized in the internal jugular and acute non-occlusive superficial venous thrombosis visualized in the mid cephalic veins   UE and LE Venous duplex (3/12): unchanged from prior  Last type and screen: 3/12  - Continue heparin gtt  - Continue SCDs for DVT prophylaxis  - Continue Aranesp every 2 weeks  - Vascular medicine following, appreciate assistance     ID:   #No acute issues  Received Zosyn and Vancomycin on 2/15 in ED  Blood cultures (2/15): NGTD  Afebrile, nontoxic  - Trend temp q4h     PHYSICAL AND OCCUPATIONAL THERAPY: ordered and following     LINES:  PIVs  RIJ trialysis catheter 3/5  R Axillary Impella 3/1  R femoral cordis + SGC 3/13      DVT: SCDs, heparin gtt  VAP BUNDLE: N/A  ULCER PPX: PPI  GLYCEMIC CONTROL: SSI  BOWEL CARE: Patti-colace BID, miralax BID  INDWELLING CATHETER: N/A  NUTRITION: Adult diet Renal; Potassium Restricted 2 gm (50mEq); 2 - 3 grams Sodium      EMERGENCY CONTACT: Extended Emergency Contact Information  Primary Emergency Contact: SAHIL DIMAS  Address: [address] [place] [address] of [facility]  Home Phone: [phone]  Mobile Phone: [phone]  Relation: Mother  FAMILY UPDATE: given at bedside  CODE STATUS: Full Code  DISPO: remain in HFICU    Patient seen and assessed with Dr. Melo     _________________________________________________  Jerilyn Jay PA-C

## 2024-03-13 NOTE — PROGRESS NOTES
Subjective Data:  No complaints today. RHC with [place] left in Rt Femoral vein. No bleeding noted while on heparin gtt. No acute overnight events.     Objective Data:  Last Recorded Vitals:  Vitals:    03/13/24 1243 03/13/24 1257 03/13/24 1300 03/13/24 1320   BP: 96/69 94/71  116/89   BP Location:       Patient Position:       Pulse: (!) 134 (!) 134  (!) 138   Resp: (!) 43 (!) 34  (!) 38   Temp: 37 °C (98.6 °F) 37.2 °C (99 °F) 37.2 °C (99 °F) 37.5 °C (99.5 °F)   TempSrc:       SpO2:    97%   Weight:       Height:           Last Labs:  CBC - 3/13/2024:  5:58 AM  7.8 7.5 144    22.9      CMP - 3/13/2024:  5:58 AM  9.0 5.7 89 --- 1.3   3.6 3.4 33 102      PTT - 3/13/2024:  5:58 AM  1.3   15.0 61     TROPHS   Date/Time Value Ref Range Status   02/16/2024 01:16  0 - 34 ng/L Final     Comment:     Previous result verified on 2/16/2024 0018 on specimen/case [id number] called with component [facility] for procedure Troponin I, High Sensitivity with value 125 ng/L.   02/15/2024 10:03  0 - 34 ng/L Final   11/10/2022 11:22  0 - 34 ng/L Final     Comment:     .  Less than 99th percentile of normal range cutoff-  Female and children under 18 years old <35 ng/L; Male <54 ng/L: Negative  Repeat testing should be performed if clinically indicated.   .  Female and children under 18 years old  ng/L; Male  ng/L:  Consistent with possible cardiac damage and possible increased clinical   risk. Serial measurements may help to assess extent of myocardial damage.   .  >120 ng/L: Consistent with cardiac damage, increased clinical risk and  myocardial infarction. Serial measurements may help assess extent of   myocardial damage.   .   NOTE: Children less than 1 year old may have higher baseline troponin   levels and results should be interpreted in conjunction with the overall   clinical context.  .  NOTE: Troponin I testing is performed using a different   testing methodology at [facility] than at  other   system hospitals. Direct result comparisons should only   be made within the same method.       BNP   Date/Time Value Ref Range Status   03/07/2024 02:02 PM 4,683 0 - 99 pg/mL Final   02/16/2024 01:16 AM >5,000 0 - 99 pg/mL Final     HGBA1C   Date/Time Value Ref Range Status   03/07/2024 02:02 PM 5.7 see below % Final   02/16/2024 01:16 AM 6.3 see below % Final     LDLCALC   Date/Time Value Ref Range Status   02/16/2024 01:16 AM 94 <=99 mg/dL Final     Comment:                                 Near   Borderline      AGE      Desirable  Optimal    High     High     Very High     0-19 Y     0 - 109     ---    110-129   >/= 130     ----    20-24 Y     0 - 119     ---    120-159   >/= 160     ----      >24 Y     0 -  99   100-129  130-159   160-189     >/=190       VLDL   Date/Time Value Ref Range Status   02/16/2024 01:16 AM 28 0 - 40 mg/dL Final   07/18/2023 09:24 AM 32 0 - 40 mg/dL Final   03/17/2023 08:38 AM 40 0 - 40 mg/dL Final   11/10/2022 11:22 PM 44 0 - 40 mg/dL Final      Last I/O:  I/O last 3 completed shifts:  In: 2041.5 (20.5 mL/kg) [I.V.:1141.5 (11.5 mL/kg); Other:800; IV Piggyback:100]  Out: 1106 (11.1 mL/kg) [Other:1106]  Dosing Weight: 99.6 kg     Imaging:  UE Venous duplex 3/11/24:  PRELIMINARY CONCLUSIONS:  Right Upper Venous: There is acute occlusive superficial venous thrombosis visualized in the mid cephalic vein. There are chronic changes visualized in the internal jugular vein. Additional Findings; IV Line and Impala in subclavian.  Left Upper Venous: There is acute occlusive superficial venous thrombosis visualized in the mid cephalic vein. There is acute non-occlusive deep vein thrombosis visualized in the internal jugular vein.    LE Venous duplex 3/11/24:  PRELIMINARY CONCLUSIONS:  Right Lower Venous: No evidence of acute deep vein thrombus visualized in the right lower extremity.  Left Lower Venous: No evidence of acute deep vein thrombus visualized in the left lower extremity. Cystic  structure noted in left groin measuring 1.79cm x 1.08cm.      UE and LE venous duplex 3/6/24:  CONCLUSIONS:  Right Upper Venous: There is acute occlusive superficial venous thrombosis visualized in the mid cephalic and acute occlusive superficial venous thrombosis visualized in the distal cephalic veins. Axillary vein compression images were not saved to review. The remainder of veins in right arm are negative for deep vein thrombus. Cannot rule out thrombus of non-compressible proximal subclavian vein due to Impella. Cannot rule out thrombus of non-visualized internal jugular, distal subclavian and mid subclavian veins due to line and Impella.  Left Upper Venous: There is acute non-occlusive deep vein thrombosis visualized in the internal jugular and acute non-occlusive superficial venous thrombosis visualized in the mid cephalic veins. The remainder of veins in left arm are negative for deep vein thrombus.      Inpatient Medications:  Scheduled medications   Medication Dose Route Frequency    aspirin  81 mg oral Daily    calcitriol  0.5 mcg oral Daily    calcium gluconate  2 g intravenous Once    darbepoetin floyd  100 mcg subcutaneous q14 days    [START ON 3/14/2024] digoxin  62.5 mcg oral Daily    ferrous sulfate (325 mg ferrous sulfate)  65 mg of iron oral Daily with breakfast    heparin  4,000 Units intravenous Once    hydrALAZINE  100 mg oral q8h    insulin lispro  0-10 Units subcutaneous Before meals & nightly    isosorbide dinitrate  40 mg oral q8h    levothyroxine  200 mcg oral Daily    melatonin  10 mg oral Nightly    multivitamin with minerals  1 tablet oral Daily    nystatin  5 mL Swish & Swallow q6h    pantoprazole  40 mg oral Daily before breakfast    perflutren lipid microspheres  0.5-10 mL of dilution intravenous Once in imaging    [START ON 3/14/2024] perflutren lipid microspheres  0.5-10 mL of dilution intravenous Once in imaging    perflutren protein A microsphere  0.5 mL intravenous Once in  imaging    perflutren protein A microsphere  0.5 mL intravenous Once in imaging    polyethylene glycol  17 g oral BID    predniSONE  10 mg oral Daily    rosuvastatin  40 mg oral Nightly    sennosides-docusate sodium  2 tablet oral BID    sirolimus  3 mg oral Daily    [Held by provider] sulfamethoxazole-trimethoprim  80 mg of trimethoprim oral Daily    sulfur hexafluoride microsphr  2 mL intravenous Once in imaging    sulfur hexafluoride microsphr  2 mL intravenous Once in imaging    tacrolimus  2 mg oral q12h TRICIA    traZODone  50 mg oral Nightly    valGANciclovir  450 mg oral q48h     PRN medications   Medication    acetaminophen    calcium carbonate    dextrose    dextrose    Or    glucagon    diphenhydrAMINE    glucagon    heparin    artificial tears    microfibrllar collagen    mupirocin    ondansetron    oxyCODONE    sodium chloride     Continuous Medications   Medication Dose Last Rate    heparin  0-4,000 Units/hr Stopped (03/13/24 0800)    lactated Ringer's  10 mL/hr 10 mL/hr (03/13/24 1130)    milrinone  0.25 mcg/kg/min (Dosing Weight) 0.25 mcg/kg/min (03/13/24 0800)    sodium bicarbonate infusion Impella Purge 25 mEq/1000 mL D5W  10 mL/hr 10 mL/hr (03/13/24 0634)       Physical Exam:  Constitutional: no acute distress   AGREE LAYING FLAT IN BED  CVS: tachycardic, regular rhythm, mechanical hum is present AGREE  RS: CTA bilaterally AGREE  Abdomen: soft, nontender  Upper extremities: Dialysis line in place on right side of neck; No swelling, bleeding, or tenderness to palpation is present  Lower extremities: mild swelling in LE, DP 2+  Neuro: no focal deficits present       Assessment/Plan   34 y.o. 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 Patient was found to have reduced heart function at admission compared to prior echocardiogram. Admitted to HFICU for cardiogenic shock and concern for acute rejection. Solumedrol and prednisone taper were given to the patient. Endomyocardial biopsies ruled out concerns for acute rejection. The patient developed cardiogenic shock c/b multiorgan dysfunction with oliguria requiring CVVH treatment.      Rt Burgin placed 2/16/24 and Left Hemodialysis catheter (nontunneled) placed on 2/17/24. Both removed on 3/2/24. Rt CVC placed 3/5/24 and removed on 3/10/24. Additional Hemodialysis cath placed on 3/10/24. UE and LE Venous duplex 3/11/24 showed Rt Cephalic V., Chronic changes in Rt internal jugular, Lt SVT in cephalic, and DVT in left internal jugular. This was likely a line related event in the setting of prolonged hospital stay and indwelling access. The patient has been on heparin prophylaxis. Dr. Bright wanted heparin prophylaxis initially given the patient's condition when DVT was first diagnosed. DVT is likely line related and needs therapeutic anticoagulation to be treated. AGREE     After discussion with the primary team and Dr. Bright, we have agreed to starting therapeutic anticoagulation. For Burgin placement, will likely have to approach from the groin due to Hemodialysis line on right side and DVT on the left side.   AGREE     Plan:  - Continue heparin gtt. Follow therapeutic assays. AGREE  - Monitor for bleeding  AGREE  - Will discuss home going anticoagulation when the patient is closer to discharge date.  AGREE  - Vascular Medicine will sign off at this time. Please call if you have any additional questions. AGREE     Sekou Cervantes  Vascular Medicine Fellow    LINE ASSOCIATED IJ DVT  IMAGING DID NOT DEMONSTRATE EXTENSION TO THE SCV BUT THIS IS NOTORIOUSLY LIMITED IN THE PROXIMAL CHEST.     AGREE WITH AC IF THIS IS OK AND  TOLERATED. GIVEN THIS NON-OCCLUSIVE EVENT THIS COULD BE FOLLOWED CLINICALLY. SHE IS AT FAIRLY LOW RISK IE APPROX 10% FOR PE.    IN THE ABSENCE OF AC - POTENTIAL FOR INCREASED RISK OF LLE RELATED THROMBOSIS FROM THE GROIN LINE IN PLACE    VM WILL S/O AS NOTED  PLEASE RESCONSULT FOR ANY ADDITIONAL CONCERNS    Skylar Prescott MD

## 2024-03-13 NOTE — PROGRESS NOTES
SOCIAL WORK NOTE   SW met with team for updates. Patient is not currently a candidate for retransplantation/LVAD. Patient Is getting last plex. She will be reconsidered in 2 weeks at committee. Patient is currently recommended for high  intensity therapy.    CHARLENE Ocampo, LEIGHANNW-S ([id number])

## 2024-03-14 NOTE — PROGRESS NOTES
Heart failure ICU attending note  33-year-old female with a medical history of stage D HFrEF status post heart transplantation March 2022 with postop course complicated by symptomatic to our ACR November 2022.  Patient presented in the setting of nausea/vomiting and subtherapeutic sirolimus/tacrolimus levels with low ejection fraction/new LVH and cardiogenic shock.  In terms of immunosuppressive therapies she received pulse steroids, has been maintained on tacrolimus/sirolimus, 2 sessions of IVIG/plasmapheresis on February 18 and 20, 2024; and 2 doses of Thymoglobulin 2/18 and 2/19.  Following an endomyocardial biopsy 2/20/2024 which was negative for rejection (in the setting of negative DSAs) - the Thymoglobulin/IVIG/plasmapheresis sessions were stopped.   IVIG/plasmapheresis resumed March 5, 2024.  Hemodynamically had been supported with ECMO initially, which was then weaned off.  Unfortunately, she required hemodynamic support again, and Impella 5 was placed.  She was eventually transferred to heart failure ICU with Impella 5.  Symptomatically doing well, but continues to have tachycardia.  Symptom wise tolerating wean to Impella P5 reasonably well.  Also tolerating Plex plus IVIG, as well as hemodialysis well.  Cardiac function is certainly no worse, and there appears to be decreased left ventricular hypertrophy on echocardiogram indicating of decreased edema.  Unfortunately, renal function appears to be heading the wrong way.  Plan.  Case was discussed at the transplant meeting.  Given concerns about unknown cause of LV dysfunction, possibly rejection, lack of tolerance of CellCept as well as azathioprine, and kidney dysfunction, she was felt to be not a candidate for cardiac transplantation at this time.  Similarly, ventricular assist device, and face of active immunosuppression was felt to be less than ideal, but was not completely ruled out.  We will request input from renal transplant team as well.  The  etiology of her cardiac dysfunction is by no means clear.  While her presentation is very suspicious for rejection, her biopsies have essentially been negative as have been a DSA's.  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    Plan  Completed 5 cycles of plasmapheresis with IVIG yesterday.   Will hold on further cycles for now.  WBC counts down. Will panculture, start empiric abx.   Rodney-Estevan catheter placed, has reasonable cardiac outputs on P8 (dipped overnight), with high filling pressures.  For Tablo todau. Will reposition Impella.   Echo relatively unchanged.   Will hold on immunosuppression change for now pending instablity. Levels high, will adjust dose.   I remain concerned about her kidneys.  Will attempt decongestion and improve forward flow with Impella as a means to help  out.  Vascular medicine input greatly appreciated.  So far tolerating heparin.  Will keep a close eye for bleeding risk which is nontrivial.    I spent time talking with the patient and her family regarding the plan.  They expressed understanding.  I remain concerned about her.     This critically ill patient continues to be at-risk for clinically significant deterioration / failure due to the above mentioned dysfunctional, unstable organ systems.  I have personally identified and managed all complex critical care issues to prevent aforementioned clinical deterioration.  Critical care time is spent at bedside and/or the immediate area and has included, but is not limited to, the review of diagnostic tests, labs, radiographs, serial assessments of hemodynamics, respiratory status, ventilatory management, and family updates.  Time spent in procedures and teaching are reported separately.    Critical care time: 55minutes

## 2024-03-14 NOTE — PROGRESS NOTES
[facility]  HFICU PROGRESS NOTE    Charla Bowles/[id number]    Admit Date: 2/15/2024  Hospital Length of Stay: 28   ICU Length of Stay: 3d 19h   Primary Service: HFICU  Primary HF Cardiologist: Dr. Cornell  Referring: Dr Cornell     INTERVAL EVENTS / PERTINENT ROS:   Patient received last session of IVIG/PLEX yesterday. O/N patient's mixed venous dropped from 58->32, CXR was ordered and SGC was in appropriate position. Repeat mixed venous confirmed low value of 37. Patient also was experiencing generalized pain, as well as nausea that resolved with diluadid and zofran. Remains HDS with MAPs in the 80s, and HR in the 130s. CVP elevated in the high 20s. WBC down trending, currently 3.4.     Plan:  - STAT complete ECHO  - CT chest/abd/pelvis  - Planning for tablo today for volume removal, goal 3.5 L   - Increase impella support to P8  - Send blood cultures + respiratory culture   - Start broad spectrum abx - vancomycin + zosyn  - Tacrolimus level (3/14): 8.9, decrease tacrolimus to 1&1   - Sirolimus level (3/14): 10.8, hold AM dose 3/15 until labs are back   - Pain control with 0.2 mg q3 diluadid PRN per palliative   - Hemodynamics q6    MEDICATIONS  Infusions:  heparin, Last Rate: 10 Units/hr (03/14/24 1100)  lactated Ringer's, Last Rate: 10 mL/hr (03/14/24 1100)  milrinone, Last Rate: 0.25 mcg/kg/min (03/14/24 1100)  sodium bicarbonate infusion Impella Purge 25 mEq/1000 mL D5W, Last Rate: 10 mL/hr (03/14/24 1130)      Scheduled:  aspirin, 81 mg, Daily  calcitriol, 0.5 mcg, Daily  calcium gluconate, 2 g, Once  darbepoetin floyd, 100 mcg, q14 days  ferrous sulfate (325 mg ferrous sulfate), 65 mg of iron, Daily with breakfast  heparin, 4,000 Units, Once  hydrALAZINE, 100 mg, q8h  insulin lispro, 0-10 Units, Before meals & nightly  isosorbide dinitrate, 40 mg, q8h  levothyroxine, 200 mcg, Daily  lidocaine, 1 patch, Daily  lidocaine, 1 patch, Daily  melatonin, 10 mg,  Nightly  multivitamin with minerals, 1 tablet, Daily  nystatin, 5 mL, q6h  pantoprazole, 40 mg, Daily before breakfast  perflutren lipid microspheres, 0.5-10 mL of dilution, Once in imaging  perflutren lipid microspheres, 0.5-10 mL of dilution, Once in imaging  perflutren lipid microspheres, 0.5-10 mL of dilution, Once in imaging  perflutren protein A microsphere, 0.5 mL, Once in imaging  polyethylene glycol, 17 g, BID  predniSONE, 10 mg, Daily  rosuvastatin, 40 mg, Nightly  sennosides-docusate sodium, 2 tablet, BID  sirolimus, 3 mg, Daily  [Held by provider] sulfamethoxazole-trimethoprim, 80 mg of trimethoprim, Daily  sulfur hexafluoride microsphr, 2 mL, Once in imaging  sulfur hexafluoride microsphr, 2 mL, Once in imaging  sulfur hexafluoride microsphr, 2 mL, Once in imaging  tacrolimus, 2 mg, q12h TRICIA  traZODone, 50 mg, Nightly  valGANciclovir, 450 mg, q48h      PRN:  acetaminophen, 975 mg, q8h PRN  calcium carbonate, 1,500 mg, q5 min PRN  dextrose, 25 g, q15 min PRN  dextrose, 25 g, q15 min PRN   Or  glucagon, 1 mg, q15 min PRN  diphenhydrAMINE, 25 mg, q5 min PRN  glucagon, 1 mg, q15 min PRN  heparin, 2,000-4,000 Units, q4h PRN  HYDROmorphone, 0.2 mg, q3h PRN  artificial tears, 2 drop, PRN  microfibrllar collagen, , PRN  mupirocin, , BID PRN  ondansetron, 4 mg, q4h PRN  oxyCODONE, 5 mg, q4h PRN  sodium chloride, 1 spray, 4x daily PRN        Impella:      Most Recent Range Past 24hrs   Performance Level 8 P Level  Min: 5   Min taken time: 03/14/24 0200  Max: 8   Max taken time: 03/14/24 1100   Flow (L/min) 4.6 Flow (L/min)  Min: 2.7   Min taken time: 03/14/24 0200  Max: 4.7   Max taken time: 03/14/24 0800   Motor Current 497/395 Motor Current  Min: 279/205   Min taken time: 03/13/24 1400  Max: 497/395   Max taken time: 03/14/24 1100   Placement Signal Yes  Placement OK could not be evaluated. This SmartLink does not work with rows of the type: Custom List   Purge (mmHg) 516 Purge Pressure (mmHg)  Min: 435   Min  "taken time: 03/14/24 0900  Max: 583   Max taken time: 03/13/24 1500   Purge rate (mL/hr) 10.8 Purge Rate (mL/hr)  Min: 10.2   Min taken time: 03/13/24 2100  Max: 11   Max taken time: 03/13/24 2000       PHYSICAL EXAM:   Visit Vitals  /75   Pulse (!) 129   Temp 36.3 °C (97.3 °F) (Temporal)   Resp 14   Ht 1.549 m (5' 0.98\")   Wt 102 kg (225 lb 1.4 oz)   SpO2 97%   BMI 42.55 kg/m²   OB Status Hysterectomy   Smoking Status Never   BSA 2.09 m²     Wt Readings from Last 5 Encounters:   03/14/24 102 kg (225 lb 1.4 oz)   12/07/23 92.1 kg (203 lb)   12/01/23 93 kg (205 lb)   11/29/23 92.9 kg (204 lb 12.8 oz)   11/09/23 91.3 kg (201 lb 3.2 oz)     INTAKE/OUTPUT:  I/O last 3 completed shifts:  In: 5751.7 (57.7 mL/kg) [P.O.:37.5; I.V.:1907.2 (19.1 mL/kg); Other:3807]  Out: 3782 (38 mL/kg) [Other:3762; Blood:20]  Dosing Weight: 99.6 kg        Physical Exam  Constitutional:       General: She is not in acute distress.     Appearance: Normal appearance. She is not ill-appearing.   HENT:      Head: Normocephalic.      Mouth/Throat:      Mouth: Mucous membranes are moist.   Eyes:      Extraocular Movements: Extraocular movements intact.      Pupils: Pupils are equal, round, and reactive to light.   Neck:      Comments: RIJ dialysis line present   Cardiovascular:      Rate and Rhythm: Regular rhythm. Tachycardia present.      Pulses: Normal pulses.      Heart sounds: Normal heart sounds.   Pulmonary:      Effort: Pulmonary effort is normal. No respiratory distress.      Breath sounds: Normal breath sounds.   Chest:      Comments: Right axillary impella site CDI   Abdominal:      General: Bowel sounds are normal.      Palpations: Abdomen is soft.      Tenderness: There is no abdominal tenderness.   Musculoskeletal:         General: Normal range of motion.      Cervical back: Normal range of motion.      Right lower leg: Edema present.      Left lower leg: Edema present.   Skin:     General: Skin is warm.      Capillary Refill: " Capillary refill takes 2 to 3 seconds.      Comments: R femoral SGC + cordis CDI    Neurological:      General: No focal deficit present.      Mental Status: She is alert and oriented to person, place, and time.   Psychiatric:         Behavior: Behavior normal. Behavior is cooperative.       DATA:  CMP:  Results from last 7 days   Lab Units 03/14/24  0623 03/13/24  0558 03/12/24  0439 03/11/24  0014 03/10/24  1214 03/10/24  0552 03/09/24  1641 03/09/24  0555 03/08/24  1247 03/08/24  0549 03/07/24  1402   SODIUM mmol/L 126* 133* 130* 130* 132* 132* 135* 132* 136  --  133*   POTASSIUM mmol/L 3.7 3.8 4.8 3.7 4.1 3.7 3.9 3.7 4.0  --  4.4   CHLORIDE mmol/L 88* 93* 89* 91* 91* 93* 95* 91* 93*  --  91*   CO2 mmol/L 26 28 24 25 25 27 28 29 29  --  27   ANION GAP mmol/L 16 16 22* 18 20 16 16 16 18  --  19   BUN mg/dL 34* 26* 43* 31* 28* 25* 19 47* 40*  --  79*   CREATININE mg/dL 4.97* 4.37* 5.77* 4.57* 3.61* 3.40* 2.41* 4.48* 3.55*  --  5.17*   EGFR mL/min/1.73m*2 11* 13* 9* 12* 16* 18* 27* 13* 17*  --  11*   MAGNESIUM mg/dL 1.92 2.09 2.11  --  2.23 1.96 1.96 1.95 1.97  --  2.26   ALBUMIN g/dL 3.2* 3.4 3.6  3.6 3.8 4.1 3.7 4.0 3.5 3.3* 3.6 3.3*   ALT U/L 27 33 24 9  --  9  --  7  --  9  --    AST U/L 85* 89* 52* 44*  --  45*  --  36  --  38  --    BILIRUBIN TOTAL mg/dL 1.5* 1.3* 0.8 0.9  --  0.9  --  0.7  --  0.8  --      CBC:  Results from last 7 days   Lab Units 03/14/24  0623 03/13/24  2322 03/13/24  1312 03/13/24  0558 03/12/24  0439 03/11/24  1314 03/11/24  0014 03/10/24  1214   WBC AUTO x10*3/uL 3.4* 3.3* 1.3* 7.7  7.8 7.4 8.4 8.8 10.4   HEMOGLOBIN g/dL 7.3* 7.3* 7.6* 7.5*  7.5* 7.9* 7.5* 7.5* 8.1*   HEMATOCRIT % 23.1* 20.8* 24.7* 22.7*  22.9* 23.9* 23.3* 21.3* 23.4*   PLATELETS AUTO x10*3/uL 114* 111* 116* 142*  144* 169 152 132* 127*   MCV fL 94 88 95 91  90 88 94 85 88     COAG:   Results from last 7 days   Lab Units 03/14/24  0623 03/13/24  0558 03/11/24  0014 03/10/24  0552 03/09/24  1641 03/09/24  0555  "03/08/24  1247 03/08/24  0549   INR  1.5* 1.3* 1.1 1.2* 1.1 1.2* 1.3* 1.3*       ABO:   No results found for: \"ABO\"    HEME/ENDO:  Results from last 7 days   Lab Units 03/07/24  1402   TSH mIU/L 14.87*   HEMOGLOBIN A1C % 5.7*      CARDIAC:   Results from last 7 days   Lab Units 03/14/24  0623 03/13/24  1633 03/13/24  0558 03/12/24  0439 03/11/24  0014 03/10/24  0552 03/09/24  0555 03/08/24  0549 03/07/24  1402   LD U/L 654* 521* 659* 424* 571* 557* 420* 496*  --    BNP pg/mL  --   --   --   --   --   --   --   --  4,683*       ASSESSMENT AND PLAN:   Charla Bowles is a 34 y/o F with PMHx of heart transplant in March 2022 with postoperative course c/b upper extremity/internal jugular DVTs, and 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 She was admitted to HFICU on 2/15 with concern for cardiogenic shock 2/2 allograft rejection and decompensated heart failure with multiorgan dysfunction including significant elevation of liver enzymes and nonoliguric acute kidney injury. Endomyocardial biopsy on 2/16 revealed mild ACR with +CD4s and negative HLAs, however multisystem organ failure persisted with increased inotropic requirements. IABP placed on 2/18. Transferred to CTICU on 2/19 for [facility] ECMO cannulation with Dr. Marina for persistent multi-organ system dysfunction. Intubated 2/21 for respiratory failure 2/2 pulmonary edema, extubated 2/24. ECMO de-cannulated 2/29/24 but had worsening HIRAM requiring CRRT and overall declined clinical status and IABP was transitioned to R axillary impella 5.5 on 3/1. Transferred from CTICU to HFICU on 3/10 for further management. Continued on milrinone gtt @ 0.25 mcg/kg/min and impella support decreased to P-level 5 on 3/11. Completed PLEX/IVIG on 3/13. Went for RHC 3/13: RA: 16, RV: 43/1, PA: 43/12, PCWP: 23, PA-SAT: 47%, CO: 5.14, CI: 2.64 on P5 of Impella 5.5 and milrinone 0.25 mcg/kg/min. Impella was increased to P6 shortly after, and overnight 3/14 patient's mixed venous dropped from " 58->32, CXR was ordered and SGC was in appropriate position. Repeat mixed venous confirmed low value of 37. Impella increased to P8 and stat ECHO ordered 3/14. WBC continued to down trend 3/14 which prompted new infectious workup to be sent, and patient to be started on broad spectrum abx. Patient transitioned to tablo from iHD for better volume removal given elevated CVPs 3/14.    NEURO:   #Acute Pain   #Insomnia  - Continue tylenol 975 mg Q8 PRN for mild pain   - Continue oxycodone 5 mg Q4 PRN for moderate pain   - Start diluadid 0.2 mg q3 PRN for severe pain   - Lidocaine patch PRN   - Continue melatonin 10 mg nightly   - Continue trazodone 50 mg nightly for insomnia  - PT/OT   - CAM ICU score qshift  - Sleep/wake cycle hygiene  - Serial neuro and pain assessments     ENT:  #Epistaxis  Significant epistaxis 2/28 and 3/3 requiring ENT consult, packing removed by ENT 3/5  S/p ocean spray 5x day x10 days completed   - Ocean spray and mupiricin PRN for dry nasal passages     CARDIAC:  #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)  Toxo: -/-   Hep C: -/-     Rejection/Prophylaxis (transplant):  - Steroids: 10 mg PO prednisone daily  - Tacrolimus: 1 mg PO @ 0630 & 1 mg PO @ 1830 w/ daily levels drawn @ 0600  - Sirolimus: Holding AM dose 3/15 until labs are back w/ daily levels drawn at 0600 (last increase 3/10 to 3 mg daily)  - Tacrolimus goal troughs: 3-5, daily level 3/14: 8.9  - Sirolimus goal troughs: 7-9, daily level 3/14: 10.8  - Combined sirolimus/tacrolimus goal of 10-12  - Antifungals: nystatin oral suspension 5 mls q6  - Antivirals: Valcyte 450 mg q48 due to low grade viremia   - Anti PCP & Toxoplasmosis: Bactrim SS daily --> holding due to thrombocytopenia (2/23)     Last cardiac biopsy: 2/16/24 with ACR1 and no AMR  Last HLA (2/16/24): negative for DSAs   Last RHC (3/13/24): RA: 16, RV: 43/1, PA: 43/12, PCWP: 23, PA-SAT: 47%, CO:  5.14, CI: 2.64 on P5 of Impella 5.5 and milrinone 0.25 mcg/kg/min   Last LHC (2/18/24): negative for CAV and CAD   Last TTE/BOB (3/1/24): shows LVEF to 30% and mild RV dysfunction (intra-op impella 5.5 insert)  Osteopenia/osteoporosis prophylaxis: Vitamin D3 and calcium supplements  Peptic/gastric ulcer prophylaxis: Pantoprazole 40 mg daily   CAV Prophylaxis: Aspirin 81 mg daily & rosuvastatin 40 mg nightly     - Unclear cause of acute severe graft dysfunction. Most likely smoldering ACR vs. AMR; however, 2x allograft biopsies on 2/16 & 2/20 remain negative for any significant pathology. Notably, both biopsies taken after rejection therapies implemented which may have reduced areas of graft damage.    - DSAs remain negative; however, patient may have non-HLA antibodies present. 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   - Negative CAV & CAD via LHC on 2/18/24   - Completed methylpred steroid pulse w/ 1g q24 x 3 days (2/16-2/18) and prednisone taper   - Thymoglobulin doses: 2/18 & 2/19   - IVIG + PLEX Sessions completed: 2/18, 2/20, 3/7, 3/11, 3/13  - Graft function slightly worse after transition to impella 5.5 on 3/1. 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  #Impella 5.5 support (3/1/24)  RHC (3/13/24): RA: 16, RV: 43/1, PA: 43/12, PCWP: 23, PA-SAT: 47%, CO: 5.14, CI: 2.64 on P5 of Impella 5.5, milrinone 0.25 mcg/kg/min, hydralazine 100 mg q8, isordil 40 mg q8  Opening SGC #s (3/13): BP 94/71 (79), PAP 42/30 (35), CVP 13, , CO/CI (kyra) 5.46/2.80, SVO2 56% on P5 of Impella 5.5, milrinone 0.25 mcg/kg/min, hydralazine 100 mg q8, isordil 40 mg q8  Daily SGC #s (3/14): /77 (85), PAP 46/33 (38), CVP 28, , CO/CI (kyra) 5.11/2.62, SVO2 43% on P6 of Impella 5.5, milrinone 0.25 mcg/kg/min, hydralazine 100 mg q8, isordil 40 mg q8  - Maintain goal MAP  70-90  - Increase Impella P level to 8, wean per clinical picture / hemodynamics   - Continue sodium bicarb purge for Impella   - Continue milrinone 0.25 mcg/kg/min  - Continue afterload reduction with PO hydralazine 75 mg q8 + PO isosorbide 40 mg q8  - Continue ASA   - Continue rosuvastatin 40 mg daily   - S/p Digoxin 125 mcg PO (3/12, 3/13), digoxin level (3/14): 0.71  - Tablo today for volume removal, goal to get off 3.5 L  - STAT ECHO ordered  - SGC #s q6, daily CXR     #Advanced therapy evaluation  - Presented to committee 3/12/2024 - concern for end organ failure (possible heart / kidney)   - Not a candidate for transplant at this time per committee discussion 3/12/24     PULM:   #Acute Hypoxic Respiratory Failure 2/2 pulmonary edema (resolved)  ETT (2/21-2/24)  Remains on RA   - Maintain SpO2 > 92%     GI:   #Nausea  - CT chest/abd/pelvis (3/14): no acute abdomen, changes consistent with pleural effusions  - Zofran PRN     #Hx of gastric bypass   #Hx of MMF colitis  #Acute transaminitis   - Continue home PPI, calcitriol 0.5 mg daily, multivitamin  - Continue miralax daily & christopher-colace daily PRN  - Trend LFTs daily      :   #Acute Renal Failure 2/2 to cardiorenal syndrome (on IHD)  Baseline Cr 1.2-1.3  CVVH stopped 2/27/24  - RFP daily and PRN  - Will transition iHD to tablo for more gently volume removal, goal 3.5 L off today 3/14  - Transplant nephrology following closely, appreciate assistance     ENDO:   #T2DM  Steroid induced hyperglycemia acceptable glycemic control on SSI  - Maintain BG <180 with hypoglycemia protocol  - Continue SSI     #Hypothyroidism  TSH (3/7): 14.87, T4 1.37, T3 60  - Continue synthroid 200 mcg daily, redraw TSH on 3/18 (ordered)  - Endocrine following, appreciate assistance      HEME:   #Acute DVT LIJ and SVT left cephalic vein and right cephalic vein   #Iron deficiency anemia  #Acute blood loss anemia   #Multiple transfusions   UE and LE Venous duplex (3/6/24): right acute  occlusive superficial venous thrombosis visualized in the mid cephalic and acute occlusive superficial venous thrombosis visualized in the distal cephalic veins, left acute non-occlusive deep vein thrombosis visualized in the internal jugular and acute non-occlusive superficial venous thrombosis visualized in the mid cephalic veins   UE and LE Venous duplex (3/12): unchanged from prior  Last type and screen: 3/12  - Continue heparin gtt  - Continue SCDs for DVT prophylaxis  - Continue Aranesp every 2 weeks  - Vascular medicine signed off 3/13, will need discussion regarding long term anticoagulation closer to discharge      ID:   #Leukopenia, likely in the setting of IVIG vs infectious process  Blood cultures (2/15): NGTD  Afebrile, nontoxic  - Send infectious workup: blood cultures + respiratory culture   - Start broad spectrum abx: vancomycin + zosyn (3/14-*)  - Follow lactate   - Trend temp q4h     PHYSICAL AND OCCUPATIONAL THERAPY: ordered and following     LINES:  PIVs  RIJ trialysis catheter 3/5  R Axillary Impella 3/1  R femoral cordis + SGC 3/13      DVT: SCDs, heparin gtt  VAP BUNDLE: N/A  ULCER PPX: PPI  GLYCEMIC CONTROL: SSI  BOWEL CARE: Patti-colace BID, miralax BID  INDWELLING CATHETER: N/A  NUTRITION: Adult diet Regular      EMERGENCY CONTACT: Extended Emergency Contact Information  Primary Emergency Contact: SAHIL DIMAS  Address: [address] [place] [address]  Home Phone: [phone]  Mobile Phone: [phone]  Relation: Mother  FAMILY UPDATE: given at bedside  CODE STATUS: Full Code  DISPO: remain in HFICU    Patient seen and assessed with Dr. Melo   _________________________________________________  Jerilyn Jay PA-C

## 2024-03-14 NOTE — PROGRESS NOTES
Occupational Therapy                 Therapy Communication Note    Patient Name: Charla Bowles  MRN: [id number]  Today's Date: 3/14/2024     Discipline: Occupational Therapy    Missed Visit Reason: Missed Visit Reason:  (Hold per HF team as patient with increased pain this AM, to get CT of chest/abdomen and repeat echo, patient also now with femoral swan; will attempt OT next visit as appropriate.)    Missed Time: Attempt    Comment:  July Araujo OTR/L  Inpatient Occupational Therapist   Rehab Office: [phone]

## 2024-03-14 NOTE — CONSULTS
"Vancomycin Dosing by Pharmacy- INITIAL    Charla MONTELONGO Yimi Bowles is a 34 y.o. year old female who Pharmacy has been consulted for vancomycin dosing for line infections. Based on the patient's indication and renal status this patient will be dosed based on a goal trough/random level of 15-20.     Renal function is currently unstable. Transitioning from iHD to Tablo today (3/14).    Visit Vitals  /75   Pulse (!) 129   Temp 36.3 °C (97.3 °F) (Temporal)   Resp 14        Lab Results   Component Value Date    CREATININE 4.97 (H) 03/14/2024    CREATININE 4.37 (H) 03/13/2024    CREATININE 5.77 (H) 03/12/2024    CREATININE 4.57 (H) 03/11/2024        Patient weight is No results found for: \"PTWEIGHT\"    No results found for: \"CULTURE\"     I/O last 3 completed shifts:  In: 5751.7 (57.7 mL/kg) [P.O.:37.5; I.V.:1907.2 (19.1 mL/kg); Other:3807]  Out: 3782 (38 mL/kg) [Other:3762; Blood:20]  Dosing Weight: 99.6 kg   [unfilled]    Lab Results   Component Value Date    PATIENTTEMP 37.0 03/14/2024    PATIENTTEMP 37.0 03/14/2024    PATIENTTEMP 37.0 03/14/2024          Assessment/Plan     Patient will be given a loading dose of 2000 mg.  Will not initiate vancomycin maintenance dose.   Follow-up level will be ordered on 3/15 AM (4 hrs post-Tablo), unless clinically indicated sooner.  Will continue to monitor renal function daily while on vancomycin and order serum creatinine at least every 48 hours if not already ordered.  Follow for continued vancomycin needs, clinical response, and signs/symptoms of toxicity.       Dougie Ray, PharmD       "

## 2024-03-14 NOTE — PROGRESS NOTES
Physical Therapy                 Therapy Communication Note    Patient Name: Charla Bowles  MRN: [id number]  Today's Date: 3/14/2024     Discipline: Physical Therapy    Missed Visit Reason: HOLD as per HF team. Team stating that the patient has unstable [place] numbers, and they are working on figuring out etiology. will re attempt when appropriate.    Missed Time: Attempt

## 2024-03-15 NOTE — PROGRESS NOTES
Art Therapy Note    Charla Bowles    Therapy Session  Referral Type: New referral this admission  Visit Type: Follow-up visit  Session Start Time: 1450  Session End Time: 1452  Intervention Delivery: In-person  Conflict of Service: Declined treatment              Treatment/Interventions       Post-assessment  Total Session Time (min): 2 minutes    Narrative  Assessment Detail: Pt was lying in bed when ATR visited, introduced AT services to pt. Pt declined and was not interested. ATR asked to stop back another time and pt said yes.  Follow-up: ATR will f/u with pt another time, provided she remains admitted.    Education Documentation  No documentation found.
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
## 2024-03-15 NOTE — PROGRESS NOTES
[facility]  HFICU PROGRESS NOTE    Charla Bowles/[id number]    Admit Date: 2/15/2024  Hospital Length of Stay: 29   ICU Length of Stay: 4d 19h   Primary Service: HFICU  Primary HF Cardiologist: Dr. Cornell  Referring: Dr Cornell     INTERVAL EVENTS / PERTINENT ROS:     Overnight, patient continues to c/o nausea and right sided generalized pain. She continues to be supported on 0.25 mcg/kg/min Milrinone infusion and Impella support increased back to P-8. Of concern, morning labs show rising transaminases, LDH is up to 1431, SvO2 47% reduced, and Hgb decline to 6.4, all thought to be related to Impella device positioning which was adjusted and appropriate positioning confirmed with echo at the bedside early this AM by Dr. aMrina and Dr. Melo (pulled back ~ 1-2 cm). X-ray reviewed this AM with PA cath in good position (unchanged from yesterday). Her B/P is low / stable, HR remains in the 120 - 130's, CVP in 20's. Impella power increases / decreases consistently with P-level (currently at P-8).        Plan:     - Continue Impella support at P-8  - Continue inotrope support Milrinone @ 0.25 mcg/kg/min  - Resume TABLO today > 2 Liter fluid removal goal    - Transfuse 1 unit leukocyte reduced PRBC (ordered)   - Repeat labs this afternoon @ 1330 (ordered)   - Follow DIC panel / hemolysis panel  - Escalate bowel regimen - ordered lactulose and increased Miralax / Patti-Colace frequency   - Low threshold to repeat CT Chest / ABD / Pelvis @ 1600 if pain not improving  - Left groin wound cultured and wound care consult placed   - Follow cultures (blood and respiratory)   - Continue Vancomycin and Zosyn   - Hemodynamics Q 6 hours  - Tacrolimus level (3/15): 4.9 (at goal)   - Sirolimus level (3/15): PENDING, hold AM dose 3/15 until labs are back     MEDICATIONS  Infusions:  lactated Ringer's, Last Rate: 10 mL/hr (03/15/24 0778)  milrinone, Last Rate: 0.25 mcg/kg/min (03/15/24 4651)  sodium  bicarbonate infusion Impella Purge 25 mEq/1000 mL D5W, Last Rate: 10 mL/hr (03/14/24 1130)      Scheduled:  [Held by provider] aspirin, 81 mg, Daily  calcitriol, 0.5 mcg, Daily  calcium gluconate, 2 g, Once  darbepoetin floyd, 100 mcg, q14 days  ferrous sulfate (325 mg ferrous sulfate), 65 mg of iron, Daily with breakfast  hydrALAZINE, 50 mg, q8h  insulin lispro, 0-10 Units, Before meals & nightly  isosorbide dinitrate, 20 mg, q8h  lactulose, 30 g, BID  levothyroxine, 200 mcg, Daily  lidocaine, 1 patch, Daily  melatonin, 10 mg, Nightly  multivitamin with minerals, 1 tablet, Daily  nystatin, 5 mL, q6h  pantoprazole, 40 mg, Daily before breakfast  piperacillin-tazobactam, 3.375 g, q8h  polyethylene glycol, 17 g, TID  predniSONE, 10 mg, Daily  rosuvastatin, 40 mg, Nightly  sennosides-docusate sodium, 2 tablet, BID  [Held by provider] sirolimus, 3 mg, Daily  sodium phosphate, 21 mmol, Once  [Held by provider] sulfamethoxazole-trimethoprim, 80 mg of trimethoprim, Daily  tacrolimus, 1 mg, q12h TRICIA  traZODone, 50 mg, Nightly  valGANciclovir, 450 mg, q48h  vancomycin, 15 mg/kg, Once      PRN:  acetaminophen, 975 mg, q8h PRN  calcium carbonate, 1,500 mg, q5 min PRN  dextrose, 25 g, q15 min PRN  dextrose, 25 g, q15 min PRN   Or  glucagon, 1 mg, q15 min PRN  diphenhydrAMINE, 25 mg, q5 min PRN  glucagon, 1 mg, q15 min PRN  HYDROmorphone, 0.2 mg, q3h PRN  artificial tears, 2 drop, PRN  microfibrllar collagen, , PRN  mupirocin, , BID PRN  OLANZapine, 2.5 mg, BID PRN  ondansetron, 4 mg, q4h PRN  oxyCODONE, 5 mg, q4h PRN  sodium chloride, 1 spray, 4x daily PRN  traZODone, 25 mg, Nightly PRN  vancomycin, , Daily PRN        Impella:      Most Recent Range Past 24hrs   Performance Level 8 P Level  Min: 6   Min taken time: 03/15/24 0600  Max: 8   Max taken time: 03/15/24 0900   Flow (L/min) 4.8 Flow (L/min)  Min: 3.6   Min taken time: 03/15/24 0100  Max: 4.9   Max taken time: 03/15/24 0800   Motor Current 503/413 Motor Current  Min:  "344/257   Min taken time: 03/14/24 2236  Max: 505/409   Max taken time: 03/15/24 0800   Placement Signal Yes  Placement OK could not be evaluated. This SmartLink does not work with rows of the type: Custom List   Purge (mmHg) 491 Purge Pressure (mmHg)  Min: 463   Min taken time: 03/14/24 1200  Max: 593   Max taken time: 03/14/24 2236   Purge rate (mL/hr) 10.2 Purge Rate (mL/hr)  Min: 10.2   Min taken time: 03/15/24 0900  Max: 11.2   Max taken time: 03/14/24 1800       PHYSICAL EXAM:   Visit Vitals  BP 95/77   Pulse (!) 124   Temp 36.8 °C (98.2 °F)   Resp (!) 37   Ht 1.549 m (5' 0.98\")   Wt 98.7 kg (217 lb 9.5 oz)   SpO2 94%   BMI 41.14 kg/m²   OB Status Hysterectomy   Smoking Status Never   BSA 2.06 m²     Wt Readings from Last 5 Encounters:   03/15/24 98.7 kg (217 lb 9.5 oz)   12/07/23 92.1 kg (203 lb)   12/01/23 93 kg (205 lb)   11/29/23 92.9 kg (204 lb 12.8 oz)   11/09/23 91.3 kg (201 lb 3.2 oz)     INTAKE/OUTPUT:  I/O last 3 completed shifts:  In: 1059.9 (10.6 mL/kg) [P.O.:37.5; I.V.:1022.4 (10.3 mL/kg)]  Out: 3400 (34.1 mL/kg) [Other:3400]  Dosing Weight: 99.6 kg        Physical Exam  Constitutional:       General: She is not in acute distress.     Appearance: Normal appearance. She is not ill-appearing.   HENT:      Head: Normocephalic.      Mouth/Throat:      Mouth: Mucous membranes are moist.   Eyes:      Extraocular Movements: Extraocular movements intact.      Pupils: Pupils are equal, round, and reactive to light.   Neck:      Comments: RIJ dialysis line present   Cardiovascular:      Rate and Rhythm: Regular rhythm. Tachycardia present.      Pulses: Normal pulses.      Heart sounds: Normal heart sounds.   Pulmonary:      Effort: Pulmonary effort is normal. No respiratory distress.      Breath sounds: Normal breath sounds.   Chest:      Comments: Right axillary impella site CDI   Abdominal:      General: Bowel sounds are normal.      Palpations: Abdomen is soft.      Tenderness: There is no abdominal " tenderness.   Musculoskeletal:         General: Normal range of motion.      Cervical back: Normal range of motion.      Right lower leg: Edema present.      Left lower leg: Edema present.   Skin:     General: Skin is warm.      Capillary Refill: Capillary refill takes 2 to 3 seconds.      Comments: R femoral SGC + cordis CDI     Left groin ECMO cannulation site with drainage    Neurological:      General: No focal deficit present.      Mental Status: She is alert and oriented to person, place, and time.   Psychiatric:         Behavior: Behavior normal. Behavior is cooperative.       DATA:  CMP:  Results from last 7 days   Lab Units 03/15/24  0810 03/15/24  0324 03/14/24  0623 03/13/24  0558 03/12/24  0439 03/11/24  0014 03/10/24  1214 03/10/24  0552 03/09/24  1641 03/09/24  0555 03/08/24  1247   SODIUM mmol/L 128* 128* 126* 133* 130* 130* 132* 132* 135* 132* 136   POTASSIUM mmol/L 3.7 4.0 3.7 3.8 4.8 3.7 4.1 3.7 3.9 3.7 4.0   CHLORIDE mmol/L 93* 93* 88* 93* 89* 91* 91* 93* 95* 91* 93*   CO2 mmol/L 25 25 26 28 24 25 25 27 28 29 29   ANION GAP mmol/L 14 14 16 16 22* 18 20 16 16 16 18   BUN mg/dL 12 7 34* 26* 43* 31* 28* 25* 19 47* 40*   CREATININE mg/dL 1.73* 1.36* 4.97* 4.37* 5.77* 4.57* 3.61* 3.40* 2.41* 4.48* 3.55*   EGFR mL/min/1.73m*2 40* 53* 11* 13* 9* 12* 16* 18* 27* 13* 17*   MAGNESIUM mg/dL 2.38 1.83 1.92 2.09 2.11  --  2.23 1.96 1.96 1.95 1.97   ALBUMIN g/dL 3.1* 3.3* 3.2* 3.4 3.6  3.6 3.8 4.1 3.7 4.0 3.5 3.3*   ALT U/L 52* 51* 27 33 24 9  --  9  --  7  --    AST U/L 222* 215* 85* 89* 52* 44*  --  45*  --  36  --    BILIRUBIN TOTAL mg/dL 2.6* 2.4* 1.5* 1.3* 0.8 0.9  --  0.9  --  0.7  --      CBC:  Results from last 7 days   Lab Units 03/15/24  0810 03/15/24  0324 03/14/24  2045 03/14/24  1528 03/14/24  0623 03/13/24  2322 03/13/24  1312 03/13/24  0558   WBC AUTO x10*3/uL 5.0 4.9 4.5 4.6 3.4* 3.3* 1.3* 7.7  7.8   HEMOGLOBIN g/dL 6.4* 6.8* 7.4* 7.5* 7.3* 7.3* 7.6* 7.5*  7.5*   HEMATOCRIT % 19.5* 19.9*  22.9* 22.7* 23.1* 20.8* 24.7* 22.7*  22.9*   PLATELETS AUTO x10*3/uL 52* 58* 52* 49* 114* 111* 116* 142*  144*   MCV fL 94 89 94 93 94 88 95 91  90     COAG:   Results from last 7 days   Lab Units 03/14/24  2214 03/14/24  0623 03/13/24  0558 03/11/24  0014 03/10/24  0552 03/09/24  1641 03/09/24  0555 03/08/24  1247   INR  1.8* 1.5* 1.3* 1.1 1.2* 1.1 1.2* 1.3*     ABO:   ABO TYPE   Date Value Ref Range Status   03/15/2024 O  Final       HEME/ENDO:         CARDIAC:   Results from last 7 days   Lab Units 03/15/24  0810 03/15/24  0324 03/14/24  2045 03/14/24  0623 03/13/24  1633 03/13/24  0558 03/12/24  0439 03/11/24  0014   LD U/L 1,441* 1,431* 1,316* 654* 521* 659* 424* 571*       ASSESSMENT AND PLAN:   Charla Bowles is a 32 y/o F with PMHx of heart transplant in March 2022 with postoperative course c/b upper extremity/internal jugular DVTs, and 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 She was admitted to HFICU on 2/15 with concern for cardiogenic shock 2/2 allograft rejection and decompensated heart failure with multiorgan dysfunction including significant elevation of liver enzymes and nonoliguric acute kidney injury. Endomyocardial biopsy on 2/16 revealed mild ACR with +CD4s and negative HLAs, however multisystem organ failure persisted with increased inotropic requirements. IABP placed on 2/18. Transferred to CTICU on 2/19 for [facility] ECMO cannulation with Dr. Marina for persistent multi-organ system dysfunction. Intubated 2/21 for respiratory failure 2/2 pulmonary edema, extubated 2/24. ECMO de-cannulated 2/29/24 but had worsening HIRAM requiring CRRT and overall declined clinical status and IABP was transitioned to R axillary impella 5.5 on 3/1. Transferred from CTICU to HFICU on 3/10 for further management. Continued on milrinone gtt @ 0.25 mcg/kg/min and impella support decreased to P-level 5 on 3/11. Completed PLEX/IVIG on 3/13. Went for RHC 3/13: RA: 16, RV: 43/1, PA: 43/12, PCWP: 23, PA-SAT: 47%, CO: 5.14, CI:  2.64 on P5 of Impella 5.5 and milrinone 0.25 mcg/kg/min. Impella was increased to P6 shortly after, and overnight 3/14 patient's mixed venous dropped from 58->32, CXR was ordered and SGC was in appropriate position. Repeat mixed venous confirmed low value of 37. Impella increased to P8 and stat ECHO ordered 3/14. WBC continued to down trend 3/14 which prompted new infectious workup to be sent, and patient to be started on broad spectrum abx. Patient transitioned to tablo from iHD for better volume removal given elevated CVPs 3/14. 03/15: Malpositioned Impella adjusted at the bedside under echo by Dr. Marina and Dr. Melo. Impella pulled back ~ 1 - 2 cm and confirmed placement. Continue TABLO therapy today with goal > 2 liters removal.      NEURO:   #Acute Pain   #Insomnia  - Continue tylenol 975 mg Q8 PRN for mild pain   - Continue oxycodone 5 mg Q4 PRN for moderate pain   - Continue Diluadid 0.2 mg q3 PRN for severe pain   - Lidocaine patch PRN   - Continue melatonin 10 mg nightly   - Continue trazodone 50 mg nightly for insomnia  - PT/OT   - CAM ICU score qshift  - Sleep/wake cycle hygiene  - Serial neuro and pain assessments     ENT:  #Epistaxis  Significant epistaxis 2/28 and 3/3 requiring ENT consult, packing removed by ENT 3/5  S/p ocean spray 5x day x10 days completed   - Ocean spray and mupiricin PRN for dry nasal passages     CARDIAC:  #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)  Toxo: -/-   Hep C: -/-     Rejection/Prophylaxis (transplant):  - Steroids: 10 mg PO prednisone daily  - Tacrolimus: 1 mg PO @ 0630 & 1 mg PO @ 1830 w/ daily levels drawn @ 0600  - Sirolimus: Holding AM dose 3/15 until labs are back w/ daily levels drawn at 0600 (last increase 3/10 to 3 mg daily)  - Tacrolimus goal troughs: 3-5, daily level 3/15: 4.9  - Sirolimus goal troughs: 7-9, daily level 3/15: PENDING  - Combined sirolimus/tacrolimus goal of  10-12  - Antifungals: nystatin oral suspension 5 mls q6  - Antivirals: Valcyte 450 mg q48 due to low grade viremia   - Anti PCP & Toxoplasmosis: Bactrim SS daily --> holding due to thrombocytopenia (2/23)     Last cardiac biopsy: 2/16/24 with ACR1 and no AMR  Last HLA (2/16/24): negative for DSAs   Last RHC (3/13/24): RA: 16, RV: 43/1, PA: 43/12, PCWP: 23, PA-SAT: 47%, CO: 5.14, CI: 2.64 on P5 of Impella 5.5 and milrinone 0.25 mcg/kg/min   Last LHC (2/18/24): negative for CAV and CAD   Last TTE/BOB (3/1/24): shows LVEF to 30% and mild RV dysfunction (intra-op impella 5.5 insert)  Osteopenia/osteoporosis prophylaxis: Vitamin D3 and calcium supplements  Peptic/gastric ulcer prophylaxis: Pantoprazole 40 mg daily   CAV Prophylaxis: Aspirin 81 mg daily & rosuvastatin 40 mg nightly     - Unclear cause of acute severe graft dysfunction. Most likely smoldering ACR vs. AMR; however, 2x allograft biopsies on 2/16 & 2/20 remain negative for any significant pathology. Notably, both biopsies taken after rejection therapies implemented which may have reduced areas of graft damage.    - DSAs remain negative; however, patient may have non-HLA antibodies present. 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   - Negative CAV & CAD via LHC on 2/18/24   - Completed methylpred steroid pulse w/ 1g q24 x 3 days (2/16-2/18) and prednisone taper   - Thymoglobulin doses: 2/18 & 2/19   - IVIG + PLEX Sessions completed: 2/18, 2/20, 3/7, 3/11, 3/13  - Graft function slightly worse after transition to impella 5.5 on 3/1. 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  #Impella 5.5 support (3/1/24)  RHC (3/13/24): RA: 16, RV: 43/1, PA: 43/12, PCWP: 23, PA-SAT: 47%, CO: 5.14, CI: 2.64 on P5 of Impella 5.5, milrinone 0.25 mcg/kg/min, hydralazine 100 mg q8, isordil 40 mg q8  Opening SGC #s (3/13): BP 94/71  (79), PAP 42/30 (35), CVP 13, , CO/CI (kyra) 5.46/2.80, SVO2 56% on P5 of Impella 5.5, milrinone 0.25 mcg/kg/min, hydralazine 100 mg q8, isordil 40 mg q8  Daily SGC #s (3/15): BP 82/48 (60), PAP (37), CVP 21, , CO/CI (kyra) 5.4/2.7, SVO2 47% on P6 of Impella 5.5, milrinone 0.25 mcg/kg/min, hydralazine 50 mg q8, isordil 20 mg q8  - Maintain goal MAP 70-90  - Increase Impella P level to 8, wean per clinical picture / hemodynamics   - Continue sodium bicarb purge for Impella   - Continue milrinone 0.25 mcg/kg/min  - Continue afterload reduction with PO hydralazine 50 mg q8 + PO isosorbide 20 mg q8 (decreased 03/14)  - Continue ASA - Currently HELD 2/2 low platelet count  - Continue rosuvastatin 40 mg daily   - S/p Digoxin 125 mcg PO (3/12, 3/13), digoxin level (3/14): 0.71 - currently discontinued - will readdress for RV protection / HR  - Tablo today for volume removal, goal to remove > 2 liters   - STAT ECHO completed 03/14: LV systolic function is severely decreased with a 25-30% estimated ejection fraction; There is mildly reduced right ventricular systolic function; Moderate mitral valve regurgitation; Moderate to severe tricuspid regurgitation visualized; There is global hypokinesis of the left ventricle with minor regional variations.  - SGC #s q6, daily CXR - reviewed    #Advanced therapy evaluation  - Presented to committee 3/12/2024 - concern for end organ failure (possible heart / kidney)   - Not a candidate for transplant at this time per committee discussion 3/12/24     PULM:   #Acute Hypoxic Respiratory Failure 2/2 pulmonary edema (resolved)  ETT (2/21-2/24)  Remains on RA   - Maintain SpO2 > 92%     GI:   #Nausea  #Constipation   - CT chest/abd/pelvis (3/14): no acute abdomen, changes consistent with pleural effusions  - Low threshold for repeat scan if pain is not improving @ 1600 (03/15)  - C/w Zofran PRN   - Initiated Olanzapine 2.5 mg IM PRN   - Initiate Lactulose 30 Gram twice daily    - C/w Christopher-Colace BID standing dose / Miralax escalated to 3 times daily standing dose    #Hx of gastric bypass   #Hx of MMF colitis  #Acute transaminitis   - Continue home PPI, calcitriol 0.5 mg daily, multivitamin  - Continue miralax daily & christopher-colace daily PRN  - Trend LFTs daily - worsening      :   #Acute Renal Failure 2/2 to cardiorenal syndrome (on IHD)  Baseline Cr 1.2-1.3  CVVH stopped 2/27/24  - RFP daily and PRN  - Tablo for more gently volume removal, goal > 2.0 L off today 3/15 - discussed with nephrology   - Transplant nephrology following closely, appreciate assistance     ENDO:   #T2DM  Steroid induced hyperglycemia acceptable glycemic control on SSI  - Maintain BG <180 with hypoglycemia protocol  - Continue SSI     #Hypothyroidism  TSH (3/7): 14.87, T4 1.37, T3 60  - Continue synthroid 200 mcg daily, redraw TSH on 3/18 (ordered)  - Endocrine following, appreciate assistance      HEME:   #Acute DVT LIJ and SVT left cephalic vein and right cephalic vein   #Iron deficiency anemia  #Acute blood loss anemia   #Multiple transfusions   #Hemolysis   UE and LE Venous duplex (3/6/24): right acute occlusive superficial venous thrombosis visualized in the mid cephalic and acute occlusive superficial venous thrombosis visualized in the distal cephalic veins, left acute non-occlusive deep vein thrombosis visualized in the internal jugular and acute non-occlusive superficial venous thrombosis visualized in the mid cephalic veins   UE and LE Venous duplex (3/12): unchanged from prior  Last type and screen: 3/15  - 03/15: Transfusing 1 unit leukocyte reduced PRBC's for tx of Hgb 6.4   - HOLDING heparin gtt & ASA given low platelet count HIT / DIC panel PENDING   - Continue SCDs for DVT prophylaxis  - Continue Aranesp every 2 weeks  - Vascular medicine signed off 3/13, will need discussion regarding long term anticoagulation closer to discharge      ID:   #Leukopenia, likely in the setting of IVIG vs infectious  process  Blood cultures (2/15): NGTD  Afebrile, nontoxic  - Send infectious workup: blood cultures + respiratory culture - PENDING - NGTD 03/15  - Start broad spectrum abx: vancomycin + zosyn (3/14-*)  - Follow lactate: 03/15: 1.4  - Left groin ECMO cannulation site - wound now draining - wound care consult placed - cultures sent - PENDING    - Trend temp q4h     PHYSICAL AND OCCUPATIONAL THERAPY: ordered and following     LINES:  PIVs  RIJ trialysis catheter 3/5  R Axillary Impella 3/1  R femoral cordis + SGC 3/13  Singh: 03/15      DVT: SCDs,   VAP BUNDLE: N/A  ULCER PPX: PPI  GLYCEMIC CONTROL: SSI  BOWEL CARE: Patti-colace BID, miralax TID, Lactulose   INDWELLING CATHETER: NA  NUTRITION: Adult diet Regular      EMERGENCY CONTACT: Extended Emergency Contact Information  Primary Emergency Contact: SAHIL DIMAS  Address: [address] [place] [address]  Home Phone: [phone]  Mobile Phone: [phone]  Relation: Mother  FAMILY UPDATE: given at bedside  CODE STATUS: Full Code  DISPO: remain in HFICU    Patient seen and assessed with Dr. Melo   _________________________________________________  JIMMY Vargas-CNP

## 2024-03-15 NOTE — PROGRESS NOTES
"Vancomycin Dosing by Pharmacy- FOLLOW UP    Charla Bowles is a 34 y.o. year old female who Pharmacy has been consulted for vancomycin dosing for line infections. Based on the patient's indication and renal status this patient is being dosed based on a goal trough/random level of 15-20.     Renal function is currently unstable. Patient received Tablo on 3/14 PM.    Current vancomycin dose: Dosing by levels due to unstable renal function.    Most recent random level: 14.8 mcg/mL    Visit Vitals  BP 90/75   Pulse (!) 134   Temp 37.2 °C (99 °F)   Resp (!) 29        Lab Results   Component Value Date    CREATININE 1.36 (H) 03/15/2024    CREATININE 4.97 (H) 03/14/2024    CREATININE 4.37 (H) 03/13/2024    CREATININE 5.77 (H) 03/12/2024        Patient weight is No results found for: \"PTWEIGHT\"    No results found for: \"CULTURE\"     I/O last 3 completed shifts:  In: 5595.5 (56.2 mL/kg) [P.O.:37.5; I.V.:1751 (17.6 mL/kg); Other:3807]  Out: 3782 (38 mL/kg) [Other:3762; Blood:20]  Dosing Weight: 99.6 kg   [unfilled]    Lab Results   Component Value Date    PATIENTTEMP 37.0 03/15/2024    PATIENTTEMP 37.0 03/14/2024    PATIENTTEMP 37.0 03/14/2024        Assessment/Plan    Slightly below goal random/trough level. Will supplement with 1500mg x 1 today. Will continue to dose by levels and monitor renal status.  The next level will be obtained once renal plan is established. May be obtained sooner if clinically indicated.   Will continue to monitor renal function daily while on vancomycin and order serum creatinine at least every 48 hours if not already ordered.  Follow for continued vancomycin needs, clinical response, and signs/symptoms of toxicity.       Dougie Ray, PharmD           "

## 2024-03-15 NOTE — PROGRESS NOTES
Physical Therapy                 Therapy Communication Note    Patient Name: Charla Bowles  MRN: [id number]  Today's Date: 3/15/2024     Discipline: Physical Therapy    Missed Visit Reason:   HOLD today as per team. PT had discussion with HF NP regarding patient. They are reporting she has worsened blood gas numbers, along with a tenuous medical status. They are requesting we wait another day to do PT. Discussed plan with femoral swan, along with PT precautions that would be followed for mobility. HF team aware, and requesting PT hold off for now.  Will re attempt when able. Of note, team reports they are going to try to remove femoral swan tomorrow if able.     Missed Time: Attempt    Comment:

## 2024-03-15 NOTE — CARE PLAN
Problem: Pain  Goal: Takes deep breaths with improved pain control throughout the shift  Outcome: Progressing  Goal: Turns in bed with improved pain control throughout the shift  Outcome: Progressing     Problem: Skin  Goal: Decreased wound size/increased tissue granulation at next dressing change  Outcome: Progressing  Goal: Participates in plan/prevention/treatment measures  Outcome: Progressing     Problem: Discharge Planning  Goal: Discharge to home or other facility with appropriate resources  Outcome: Progressing     Problem: Chronic Conditions and Co-morbidities  Goal: Patient's chronic conditions and co-morbidity symptoms are monitored and maintained or improved  Outcome: Progressing     Problem: Fall/Injury  Goal: Not fall by end of shift  Outcome: Progressing  Goal: Be free from injury by end of the shift  Outcome: Progressing  Goal: Verbalize understanding of personal risk factors for fall in the hospital  Outcome: Progressing  Goal: Verbalize understanding of risk factor reduction measures to prevent injury from fall in the home  Outcome: Progressing  Goal: Use assistive devices by end of the shift  Outcome: Progressing  Goal: Pace activities to prevent fatigue by end of the shift  Outcome: Progressing   The patient's goals for the shift include  pain relief    The clinical goals for the shift include Patient will be medically cleared to transfer to HFICU during the shift    Over the shift, the patient did not make progress toward the following goals. Barriers to progression include acuteness of illness. Recommendations to address these barriers include patient education, involve family in POC

## 2024-03-15 NOTE — PROGRESS NOTES
Wound Care Progress Note     Visit Date: 3/15/2024      Patient Name: Charla Bowles         MRN: [id number]                Reason for Visit: Right groin previous ECHMO site     Wound Assessment:  Wound 02/29/24 Other (comment) Groin Left;Anterior (Active)   Date First Assessed/Time First Assessed: 02/29/24 1340   Present on Original Admission: No  Hand Hygiene Completed: Yes  Primary Wound Type: (c) Other (comment)  Location: Groin  Wound Location Orientation: Left;Anterior      Assessments 3/15/2024  2:54 PM   Wound Image     Site Assessment Denuded;Necrotic;Induration   Patti-Wound Assessment Dark edges;Indurated   Wound Length (cm) 4 cm   Wound Width (cm) 4 cm   Wound Surface Area (cm^2) 16 cm^2   State of Healing Slough   Drainage Description Serous   Drainage Amount Small   Dressing Other (Comment);Alginate;Silicone border dressing   Dressing Changed New   Dressing Status Clean;Dry;Occlusive       Active Orders   Date Order Priority Status Authorizing Provider   03/15/24 1150 Inpatient Consult to Wound and Ostomy Nurse Routine Active JIMMY Musa-CNP     - Reason for Consult?:    Wound     Wound 02/29/24 Other (comment) Groin Left;Anterior (Active)   Date First Assessed/Time First Assessed: 02/29/24 1340   Present on Original Admission: No  Hand Hygiene Completed: Yes  Primary Wound Type: (c) Other (comment)  Location: Groin  Wound Location Orientation: Left;Anterior   Number of days: 15       Wound 03/04/24 Incision Leg Left;Proximal;Upper;Anterior (Active)   Date First Assessed/Time First Assessed: 03/04/24 0800   Primary Wound Type: Incision  Location: Leg  Wound Location Orientation: Left;Proximal;Upper;Anterior  Wound Outcome: Healed   Number of days: 11       Wound 03/10/24 Incision Sternum Mid (Active)   Date First Assessed/Time First Assessed: 03/10/24 0815   Hand Hygiene Completed: Yes  Primary Wound Type: Incision  Location: Sternum  Wound Location Orientation: Mid   Number of days:  5     Wound 02/29/24 Other (comment) Groin Left;Anterior (Active)   Wound Image   03/15/24 1454   Site Assessment Denuded;Necrotic;Induration 03/15/24 1454   Patti-Wound Assessment Dark edges;Indurated 03/15/24 1454   Wound Length (cm) 4 cm 03/15/24 1454   Wound Width (cm) 4 cm 03/15/24 1454   Wound Surface Area (cm^2) 16 cm^2 03/15/24 1454   State of Healing Slough 03/15/24 1454   Margins Attached edges 03/10/24 0800   Drainage Description Serous 03/15/24 1454   Drainage Amount Small 03/15/24 1454   Dressing Other (Comment);Alginate;Silicone border dressing 03/15/24 1454   Dressing Changed New 03/15/24 1454   Dressing Status Clean;Dry;Occlusive 03/15/24 1454         Wound location: Left groin      Undermining: no  Tracking: no  Wound type: previous ECHMO site  Wound bed: moist red tissue with adherent tan/yellow slough   Draining: malodorous serous fluid  Periwound skin: denuded, intact    Recommendations: DAILY     Irrigate with normal saline or wound cleanser      Coat wound bed with Medihoney (at bedside)     Cover with Aquacel Extra ([facility] [employer] order #[id number] )       Cover with Mepilex border dressing       While in bed patient should only be on a fitted sheet, and one EHOB repositioning sheet with appropriate white chux. Please do not use brief while patient is resting in bed. Turn and reposition patient at least every 2 hours.  Elevate heels off the bed surface at all times.      Wound Team Plan: Primary provider, please review recommendation. If you agree with recommendation please enter as wound orders in EMR. Thank you.       Kathe Miranda RN, CWON  3/15/2024  2:55 PM

## 2024-03-15 NOTE — PROGRESS NOTES
"Palliative Medicine following for:  Complex medical decision making, symptom management, patient/family support    History obtained from chart review including ED note, H&P, patient's daily progress notes, review of lab/test results, and discussion with primary team and bedside RN.    Subjective      Symptoms  Pain: in right side of chest and body. Dilaudid 0.2 mg iv helps.   Dyspnea: mild  Constipation: has not had a BM since swan was placed.   Nausea: reports significant nausea.   Appetite: poor appetite.       Objective    Last Recorded Vitals  BP 97/65   Pulse (!) 129   Temp 36.8 °C (98.2 °F)   Resp 24   Ht 1.549 m (5' 0.98\")   Wt 98.7 kg (217 lb 9.5 oz)   SpO2 96%   BMI 41.14 kg/m²      Physical Exam  HENT:      Head: Normocephalic and atraumatic.   Eyes:      Conjunctiva/sclera: Conjunctivae normal.   Neck:      Comments: R swan johnnie cath in place.   Cardiovascular:      Rate and Rhythm: Tachycardia present.      Heart sounds: Murmur heard.   Pulmonary:      Comments: R decreased breath sounds with L basal crackles.   Abdominal:      Tenderness: There is abdominal tenderness.   Musculoskeletal:      Right lower leg: No edema.      Left lower leg: No edema.   Skin:     General: Skin is warm and dry.   Neurological:      General: No focal deficit present.      Mental Status: She is alert and oriented to person, place, and time.   Psychiatric:         Mood and Affect: Mood normal.          Relevant Results   Results for orders placed or performed during the hospital encounter of 02/15/24 (from the past 24 hour(s))   Transthoracic Echo (TTE) Limited   Result Value Ref Range    LVOT diam 1.88 cm    LV biplane EF 40 %    LVIDd 4.00 cm    RVSP 30.2 mmHg    LV A4C EF 36.1    POCT GLUCOSE   Result Value Ref Range    POCT Glucose 129 (H) 74 - 99 mg/dL   Lactate   Result Value Ref Range    Lactate 1.3 0.4 - 2.0 mmol/L   Blood Culture    Specimen: Peripheral Venipuncture; Blood culture   Result Value Ref Range    " Blood Culture Loaded on Instrument - Culture in progress    Blood Culture    Specimen: Peripheral Venipuncture; Blood culture   Result Value Ref Range    Blood Culture Loaded on Instrument - Culture in progress    CBC and Auto Differential   Result Value Ref Range    WBC 4.6 4.4 - 11.3 x10*3/uL    nRBC 18.8 (H) 0.0 - 0.0 /100 WBCs    RBC 2.45 (L) 4.00 - 5.20 x10*6/uL    Hemoglobin 7.5 (L) 12.0 - 16.0 g/dL    Hematocrit 22.7 (L) 36.0 - 46.0 %    MCV 93 80 - 100 fL    MCH 30.6 26.0 - 34.0 pg    MCHC 33.0 32.0 - 36.0 g/dL    RDW 17.1 (H) 11.5 - 14.5 %    Platelets 49 (L) 150 - 450 x10*3/uL    Neutrophils % 86.2 40.0 - 80.0 %    Immature Granulocytes %, Automated 5.0 (H) 0.0 - 0.9 %    Lymphocytes % 1.3 13.0 - 44.0 %    Monocytes % 6.6 2.0 - 10.0 %    Eosinophils % 0.7 0.0 - 6.0 %    Basophils % 0.2 0.0 - 2.0 %    Neutrophils Absolute 3.95 1.20 - 7.70 x10*3/uL    Immature Granulocytes Absolute, Automated 0.23 0.00 - 0.70 x10*3/uL    Lymphocytes Absolute 0.06 (L) 1.20 - 4.80 x10*3/uL    Monocytes Absolute 0.30 0.10 - 1.00 x10*3/uL    Eosinophils Absolute 0.03 0.00 - 0.70 x10*3/uL    Basophils Absolute 0.01 0.00 - 0.10 x10*3/uL   BLOOD GAS MIXED VENOUS FULL PANEL   Result Value Ref Range    POCT pH, Mixed 7.45 (H) 7.33 - 7.43 pH    POCT pCO2, Mixed 39 (L) 41 - 51 mm Hg    POCT pO2, Mixed 34 (L) 35 - 45 mm Hg    POCT SO2, Mixed 51 45 - 75 %    POCT Oxy Hemoglobin, Mixed 49.3 45.0 - 75.0 %    POCT Hematocrit Calculated, Mixed 23.0 (L) 36.0 - 46.0 %    POCT Sodium, Mixed 129 (L) 136 - 145 mmol/L    POCT Potassium, Mixed 4.1 3.5 - 5.3 mmol/L    POCT Chloride, Mixed 95 (L) 98 - 107 mmol/L    POCT Ionized Calcium, Mixed 1.13 1.10 - 1.33 mmol/L    POCT Glucose, Mixed 121 (H) 74 - 99 mg/dL    POCT Lactate, Mixed 1.3 0.4 - 2.0 mmol/L    POCT Base Excess, Mixed 2.9 -2.0 - 3.0 mmol/L    POCT HCO3 Calculated, Mixed 27.1 (H) 22.0 - 26.0 mmol/L    POCT Hemoglobin, Mixed 7.7 (L) 12.0 - 16.0 g/dL    POCT Anion Gap, Mixed 11 10 - 25 mmo/L     Patient Temperature 37.0 degrees Celsius    FiO2 0 %   POCT GLUCOSE   Result Value Ref Range    POCT Glucose 121 (H) 74 - 99 mg/dL   POCT GLUCOSE   Result Value Ref Range    POCT Glucose 118 (H) 74 - 99 mg/dL   Lactate Dehydrogenase   Result Value Ref Range    LDH 1,316 (H) 84 - 246 U/L   CBC and Auto Differential   Result Value Ref Range    WBC 4.5 4.4 - 11.3 x10*3/uL    nRBC 24.2 (H) 0.0 - 0.0 /100 WBCs    RBC 2.44 (L) 4.00 - 5.20 x10*6/uL    Hemoglobin 7.4 (L) 12.0 - 16.0 g/dL    Hematocrit 22.9 (L) 36.0 - 46.0 %    MCV 94 80 - 100 fL    MCH 30.3 26.0 - 34.0 pg    MCHC 32.3 32.0 - 36.0 g/dL    RDW 17.4 (H) 11.5 - 14.5 %    Platelets 52 (L) 150 - 450 x10*3/uL    Immature Granulocytes %, Automated 6.2 (H) 0.0 - 0.9 %    Immature Granulocytes Absolute, Automated 0.28 0.00 - 0.70 x10*3/uL   Heparin Assay, UFH   Result Value Ref Range    Heparin Unfractionated 0.7 See Comment Below for Therapeutic Ranges IU/mL   Manual Differential   Result Value Ref Range    Neutrophils %, Manual 78.6 40.0 - 80.0 %    Bands %, Manual 2.7 0.0 - 5.0 %    Lymphocytes %, Manual 5.4 13.0 - 44.0 %    Monocytes %, Manual 6.2 2.0 - 10.0 %    Eosinophils %, Manual 5.3 0.0 - 6.0 %    Basophils %, Manual 0.0 0.0 - 2.0 %    Atypical Lymphocytes %, Manual 0.9 0.0 - 2.0 %    Metamyelocytes %, Manual 0.9 0.0 - 0.0 %    Seg Neutrophils Absolute, Manual 3.54 1.20 - 7.00 x10*3/uL    Bands Absolute, Manual 0.12 0.00 - 0.70 x10*3/uL    Lymphocytes Absolute, Manual 0.24 (L) 1.20 - 4.80 x10*3/uL    Monocytes Absolute, Manual 0.28 0.10 - 1.00 x10*3/uL    Eosinophils Absolute, Manual 0.24 0.00 - 0.70 x10*3/uL    Basophils Absolute, Manual 0.00 0.00 - 0.10 x10*3/uL    Atypical Lymphs Absolute, Manual 0.04 0.00 - 0.50 x10*3/uL    Metamyelocytes Absolute, Manual 0.04 0.00 - 0.00 x10*3/uL    Total Cells Counted 112     Neutrophils Absolute, Manual 3.66 1.20 - 7.70 x10*3/uL    RBC Morphology See Below     Polychromasia Mild     RBC Fragments Few     Martin  Cells Few    Coagulation Screen   Result Value Ref Range    Protime 20.0 (H) 9.8 - 12.8 seconds    INR 1.8 (H) 0.9 - 1.1    aPTT 65 (H) 27 - 38 seconds   Fibrinogen   Result Value Ref Range    Fibrinogen 243 200 - 400 mg/dL   D-dimer, Non VTE   Result Value Ref Range    D-Dimer Non VTE, Quant (ng/mL FEU) 3,973 (H) <=500 ng/mL FEU   BLOOD GAS MIXED VENOUS FULL PANEL   Result Value Ref Range    POCT pH, Mixed 7.48 (H) 7.33 - 7.43 pH    POCT pCO2, Mixed 35 (L) 41 - 51 mm Hg    POCT pO2, Mixed 32 (L) 35 - 45 mm Hg    POCT SO2, Mixed 48 45 - 75 %    POCT Oxy Hemoglobin, Mixed 46.3 45.0 - 75.0 %    POCT Hematocrit Calculated, Mixed 22.0 (L) 36.0 - 46.0 %    POCT Sodium, Mixed 129 (L) 136 - 145 mmol/L    POCT Potassium, Mixed 4.1 3.5 - 5.3 mmol/L    POCT Chloride, Mixed 96 (L) 98 - 107 mmol/L    POCT Ionized Calcium, Mixed 1.09 (L) 1.10 - 1.33 mmol/L    POCT Glucose, Mixed 126 (H) 74 - 99 mg/dL    POCT Lactate, Mixed 1.4 0.4 - 2.0 mmol/L    POCT Base Excess, Mixed 2.5 -2.0 - 3.0 mmol/L    POCT HCO3 Calculated, Mixed 26.1 (H) 22.0 - 26.0 mmol/L    POCT Hemoglobin, Mixed 7.4 (L) 12.0 - 16.0 g/dL    POCT Anion Gap, Mixed 11 10 - 25 mmo/L    Patient Temperature 37.0 degrees Celsius    FiO2 21 %   CBC   Result Value Ref Range    WBC 4.9 4.4 - 11.3 x10*3/uL    nRBC 37.4 (H) 0.0 - 0.0 /100 WBCs    RBC 2.23 (L) 4.00 - 5.20 x10*6/uL    Hemoglobin 6.8 (L) 12.0 - 16.0 g/dL    Hematocrit 19.9 (L) 36.0 - 46.0 %    MCV 89 80 - 100 fL    MCH 30.5 26.0 - 34.0 pg    MCHC 34.2 32.0 - 36.0 g/dL    RDW 17.2 (H) 11.5 - 14.5 %    Platelets 58 (L) 150 - 450 x10*3/uL   Calcium, Ionized   Result Value Ref Range    POCT Calcium, Ionized 1.01 (L) 1.1 - 1.33 mmol/L   Hepatic function panel   Result Value Ref Range    Albumin 3.3 (L) 3.4 - 5.0 g/dL    Bilirubin, Total 2.4 (H) 0.0 - 1.2 mg/dL    Bilirubin, Direct 0.7 (H) 0.0 - 0.3 mg/dL    Alkaline Phosphatase 108 33 - 110 U/L    ALT 51 (H) 7 - 45 U/L     (H) 9 - 39 U/L    Total Protein 7.0 6.4  - 8.2 g/dL   Lactate Dehydrogenase   Result Value Ref Range    LDH 1,431 (H) 84 - 246 U/L   Magnesium   Result Value Ref Range    Magnesium 1.83 1.60 - 2.40 mg/dL   Phosphorus   Result Value Ref Range    Phosphorus 1.5 (L) 2.5 - 4.9 mg/dL   Basic Metabolic Panel   Result Value Ref Range    Glucose 136 (H) 74 - 99 mg/dL    Sodium 128 (L) 136 - 145 mmol/L    Potassium 4.0 3.5 - 5.3 mmol/L    Chloride 93 (L) 98 - 107 mmol/L    Bicarbonate 25 21 - 32 mmol/L    Anion Gap 14 10 - 20 mmol/L    Urea Nitrogen 7 6 - 23 mg/dL    Creatinine 1.36 (H) 0.50 - 1.05 mg/dL    eGFR 53 (L) >60 mL/min/1.73m*2    Calcium 8.0 (L) 8.6 - 10.6 mg/dL   Vancomycin   Result Value Ref Range    Vancomycin 14.8 5.0 - 20.0 ug/mL   BLOOD GAS MIXED VENOUS FULL PANEL   Result Value Ref Range    POCT pH, Mixed 7.48 (H) 7.33 - 7.43 pH    POCT pCO2, Mixed 34 (L) 41 - 51 mm Hg    POCT pO2, Mixed 32 (L) 35 - 45 mm Hg    POCT SO2, Mixed 47 45 - 75 %    POCT Oxy Hemoglobin, Mixed 44.7 (L) 45.0 - 75.0 %    POCT Hematocrit Calculated, Mixed 26.0 (L) 36.0 - 46.0 %    POCT Sodium, Mixed 128 (L) 136 - 145 mmol/L    POCT Potassium, Mixed 4.0 3.5 - 5.3 mmol/L    POCT Chloride, Mixed 95 (L) 98 - 107 mmol/L    POCT Ionized Calcium, Mixed 1.04 (L) 1.10 - 1.33 mmol/L    POCT Glucose, Mixed 142 (H) 74 - 99 mg/dL    POCT Lactate, Mixed 1.5 0.4 - 2.0 mmol/L    POCT Base Excess, Mixed 1.8 -2.0 - 3.0 mmol/L    POCT HCO3 Calculated, Mixed 25.3 22.0 - 26.0 mmol/L    POCT Hemoglobin, Mixed 8.6 (L) 12.0 - 16.0 g/dL    POCT Anion Gap, Mixed 12 10 - 25 mmo/L    Patient Temperature 37.0 degrees Celsius    FiO2 21 %   Prepare RBC: 1 Units, Leukocytes Reduced (CMV reduced risk)   Result Value Ref Range    PRODUCT CODE S9193K55     Unit Number [id number]     Unit ABO O     Unit RH NEG     XM INTEP COMP     Dispense Status IS     Blood Expiration Date March 17, 2024 23:59 EDT     PRODUCT BLOOD TYPE 9500     UNIT VOLUME 283    Type and screen   Result Value Ref Range    ABO TYPE O      Rh TYPE POS     ANTIBODY SCREEN NEG    POCT GLUCOSE   Result Value Ref Range    POCT Glucose 158 (H) 74 - 99 mg/dL   CBC and Auto Differential   Result Value Ref Range    WBC 5.0 4.4 - 11.3 x10*3/uL    nRBC 27.3 (H) 0.0 - 0.0 /100 WBCs    RBC 2.07 (L) 4.00 - 5.20 x10*6/uL    Hemoglobin 6.4 (LL) 12.0 - 16.0 g/dL    Hematocrit 19.5 (L) 36.0 - 46.0 %    MCV 94 80 - 100 fL    MCH 30.9 26.0 - 34.0 pg    MCHC 32.8 32.0 - 36.0 g/dL    RDW 17.8 (H) 11.5 - 14.5 %    Platelets      Neutrophils %      Immature Granulocytes %, Automated      Lymphocytes %      Monocytes %      Eosinophils %      Basophils %      Neutrophils Absolute      Lymphocytes Absolute      Monocytes Absolute      Eosinophils Absolute      Basophils Absolute     Comprehensive Metabolic Panel   Result Value Ref Range    Glucose 147 (H) 74 - 99 mg/dL    Sodium 128 (L) 136 - 145 mmol/L    Potassium 3.7 3.5 - 5.3 mmol/L    Chloride 93 (L) 98 - 107 mmol/L    Bicarbonate 25 21 - 32 mmol/L    Anion Gap 14 10 - 20 mmol/L    Urea Nitrogen 12 6 - 23 mg/dL    Creatinine 1.73 (H) 0.50 - 1.05 mg/dL    eGFR 40 (L) >60 mL/min/1.73m*2    Calcium 7.9 (L) 8.6 - 10.6 mg/dL    Albumin 3.1 (L) 3.4 - 5.0 g/dL    Alkaline Phosphatase 106 33 - 110 U/L    Total Protein 6.6 6.4 - 8.2 g/dL     (H) 9 - 39 U/L    Bilirubin, Total 2.6 (H) 0.0 - 1.2 mg/dL    ALT 52 (H) 7 - 45 U/L   Lactate Dehydrogenase   Result Value Ref Range    LDH     Magnesium   Result Value Ref Range    Magnesium 2.38 1.60 - 2.40 mg/dL   Phosphorus   Result Value Ref Range    Phosphorus 1.6 (L) 2.5 - 4.9 mg/dL   Lactate   Result Value Ref Range    Lactate 1.4 0.4 - 2.0 mmol/L     *Note: Due to a large number of results and/or encounters for the requested time period, some results have not been displayed. A complete set of results can be found in Results Review.      CT chest abdomen pelvis wo IV contrast  Narrative: Interpreted By:  Chandra Galicia,  Peter Huerta   STUDY:  CT CHEST ABDOMEN  PELVIS WO CONTRAST;  3/14/2024 9:49 am      INDICATION:  Signs/Symptoms:Impella placement.  33 y/o  F with Signs/Symptoms:Impella placement.      COMPARISON:  CT chest 03/07/2024.      Impression: Chest  1.  Right subclavian approach Impella device with distal tip of the  apex of the left ventricle. Other medical devices as above.  2. Low lung volumes bilaterally with small-to-moderate bilateral  pleural effusions and adjacent atelectasis.      Abdomen-Pelvis  1.  Diffuse haziness of the mesentery, moderate amount of layering  ascites within the abdomen/pelvis, and moderate diffuse anasarca,  likely secondary to patient's poor cardiac function. Correlate with  patient's volume status.      I personally reviewed the images/study and I agree with the findings  as stated by Dr. Bradley Murillo. This study was interpreted at  [place], [place].      MACRO:  None      Signed by: Chandra Galicia 3/14/2024 3:38 PM  Dictation workstation:   [id number]  Transthoracic Echo (TTE) Limited     CONCLUSIONS:   1. Left ventricular systolic function is severely decreased with a 25-30% estimated ejection fraction.   2. There is mildly reduced right ventricular systolic function.   3. Moderate mitral valve regurgitation.   4. Moderate to severe tricuspid regurgitation visualized.   5. There is global hypokinesis of the left ventricle with minor regional variations.       [id number] Evelyn Mary MD  Electronically signed on 3/14/2024 at 2:23:08 PM       ** Final **  XR chest 1 view  Narrative: Interpreted By:  Omari Gutierrez,   STUDY:  XR CHEST 1 VIEW;  3/14/2024 7:20 am      INDICATION:  Signs/Symptoms:SGC positioning.      COMPARISON:  03/13/2024      ACCESSION NUMBER(S):  [id number]      ORDERING CLINICIAN:  JOANNA KEANE      FINDINGS:  AP radiograph of the chest was provided.    Impression: 1.  Mild recurrence of vascular congestion in the interim. No other  significant changes.               MACRO:  None      Signed by: Omari Gutierrez 3/14/2024 9:33 AM  Dictation workstation:   [id number]  XR chest 1 view, XR chest 1 view, XR chest 1 view, XR chest 1 view, XR chest 1 view, XR chest 1 view, XR chest 1 view  Narrative: Interpreted By:  Omari Gutierrez,  and Coco Macias   STUDY:       Encounter Date: 02/15/24   Electrocardiogram, 12-lead PRN ACS symptoms   Result Value    Ventricular Rate 149    Atrial Rate 149    SC Interval 120    QRS Duration 76    QT Interval 328    QTC Calculation(Bazett) 516    R Axis -28    T Axis 27    QRS Count 25    Q Onset 225    T Offset 389    QTC Fredericia 444    Narrative    Sinus tachycardia  Low voltage QRS  Possible Anteroseptal infarct  Possible Lateral infarct  Abnormal ECG  Confirmed by Kei Lozano ([id number]) on 3/7/2024 3:01:50 PM      Allergies  Mycophenolate mofetil, Dapagliflozin, Empagliflozin, Topiramate, and Latex  Medications  Scheduled medications  aspirin, 81 mg, oral, Daily  calcitriol, 0.5 mcg, oral, Daily  calcium gluconate, 2 g, intravenous, Once  darbepoetin floyd, 100 mcg, subcutaneous, q14 days  ferrous sulfate (325 mg ferrous sulfate), 65 mg of iron, oral, Daily with breakfast  hydrALAZINE, 50 mg, oral, q8h  insulin lispro, 0-10 Units, subcutaneous, Before meals & nightly  isosorbide dinitrate, 20 mg, oral, q8h  levothyroxine, 200 mcg, oral, Daily  lidocaine, 1 patch, transdermal, Daily  lidocaine, 1 patch, transdermal, Daily  magnesium sulfate, 4 g, intravenous, Once  melatonin, 10 mg, oral, Nightly  multivitamin with minerals, 1 tablet, oral, Daily  nystatin, 5 mL, Swish & Swallow, q6h  pantoprazole, 40 mg, oral, Daily before breakfast  piperacillin-tazobactam, 3.375 g, intravenous, q8h  polyethylene glycol, 17 g, oral, BID  predniSONE, 10 mg, oral, Daily  rosuvastatin, 40 mg, oral, Nightly  sennosides-docusate sodium, 2 tablet, oral, BID  [Held by provider] sirolimus, 3 mg, oral, Daily  sodium phosphate, 21 mmol, intravenous,  Once  [Held by provider] sulfamethoxazole-trimethoprim, 80 mg of trimethoprim, oral, Daily  tacrolimus, 1 mg, oral, q12h TRICIA  traZODone, 50 mg, oral, Nightly  valGANciclovir, 450 mg, oral, q48h  vancomycin, 15 mg/kg, intravenous, Once      Continuous medications  lactated Ringer's, 10 mL/hr, Last Rate: 10 mL/hr (03/15/24 0544)  milrinone, 0.25 mcg/kg/min (Dosing Weight), Last Rate: 0.25 mcg/kg/min (03/15/24 6234)  sodium bicarbonate infusion Impella Purge 25 mEq/1000 mL D5W, 10 mL/hr, Last Rate: 10 mL/hr (03/14/24 1130)      PRN medications  PRN medications: acetaminophen, calcium carbonate, dextrose, dextrose **OR** glucagon, diphenhydrAMINE, glucagon, HYDROmorphone, artificial tears, microfibrllar collagen, mupirocin, ondansetron, oxyCODONE, sodium chloride, traZODone, vancomycin     Assessment/Plan    This is a 32 yo heart transplant recpient (3/2023) who presented on 2/15/24 with signs of acute cellular rejection on heart biopsy 2/16  and multiorgan dysfunction in the setting of ADHF. Multiple pressors started on admission. IABP started on 2/18.  Was on VA-ECMO 2/19-  , the impella 5.5 currently on P8. She has received pulse steroids, was maintained on tacrolimus/sirolimus, 2 sessions of IVIG/plasmapheresis on 2/18, 2/20 and 2 doses of Thymoglobulin 2/18 and 2/19.  Following an endomyocardial biopsy 2/20/2024 which was negative for rejection (in the setting of negative DSAs) - the Thymoglobulin/IVIG/plasmapheresis sessions were stopped. Then treated again with 5 treatments of IVIG/plasmapheresis from 3/5/24. We are continuing to follow up for symptom management and support; and eventual GOC but will hold off till next week, hoping she can improve.      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 possible SLE cardiomyopathy)  #CHF with severely reduced EF s/p ICD  #Acute on chronic CHF requiring inotropic support  #Pulmonary  HTN  #SLE  #Hypothyroidism  #Nausea  #Hemorrhoids    #Acute Pain   -continue dilaudid 0.2 mg iv q3 prn    #Constipation- severe, with resultant nausea  -please consider lactulose 30 gm bid until BM  -patient requesting enema, which won't be a bad idea unless we feel it will contaminate lines.   -consider olanzapine 2.5 mg bid prn    #psychosocial support  - Music therapy  - Spiritual care support      Thank you for allowing us to care for this patient. Palliative Team will continue to follow as needed. Please contact team with any questions or concerns.   Team pager [phone] (weekdays)      Tommy Chaudhary MD [facility]  Palliative Medicine Physician  [email or web address]

## 2024-03-16 NOTE — PROGRESS NOTES
[facility]  HFICU PROGRESS NOTE    Charla Bowles/[id number]    Admit Date: 2/15/2024  Hospital Length of Stay: 30   ICU Length of Stay: 5d 22h   Primary Service: HFICU  Primary HF Cardiologist: Dr. Cornell  Referring: Dr Cornell     INTERVAL EVENTS / PERTINENT ROS:     Last evening patients impella was alarming for positioning, Dr Lewis to bedside to adjust under ECHO. Otherwise no other events overnight. Patients labs continue to hemolyze with LDH now reaching over 2000. Impella performance level dropped to P6. [place] removed this AM with plans for tablo overnight tonight. Patients right sided quadrant pain reemerged this afternoon.     Plan:   - Continue Impella support at P-6  - Continue inotrope support Milrinone @ 0.25 mcg/kg/min  - TABLO tonight > 2 Liter fluid removal goal    - Transfuse 1 unit leukocyte reduced PRBC (ordered)   - Repeat labs this afternoon @ 1500 (ordered)   - Consult heme for ruling out DIC, lab orders placed   - Low threshold to repeat CT Chest / ABD / Pelvis this evening if pain not improving  - Remove swan   - Tacrolimus level (3/16): 4.9 (at goal)   - Sirolimus level (3/15): PENDING, hold AM dose 3/16 until labs are back   - Per Dr Marina, if she continues to hemolyze will consider replacing Impella tomorrow or early next week     MEDICATIONS  Infusions:  heparin  lactated Ringer's, Last Rate: 10 mL/hr (03/16/24 0400)  milrinone, Last Rate: 0.25 mcg/kg/min (03/16/24 0935)  sodium bicarbonate infusion Impella Purge 25 mEq/1000 mL D5W, Last Rate: 10 mL/hr (03/14/24 1130)      Scheduled:  [Held by provider] aspirin, 81 mg, Daily  calcitriol, 0.5 mcg, Daily  calcium gluconate, 2 g, Once  darbepoetin floyd, 100 mcg, q14 days  ferrous sulfate (325 mg ferrous sulfate), 65 mg of iron, Daily with breakfast  hydrALAZINE, 50 mg, q8h  insulin lispro, 0-10 Units, Before meals & nightly  isosorbide dinitrate, 20 mg, q8h  levothyroxine, 200 mcg, Daily  lidocaine, 1  patch, Daily  melatonin, 10 mg, Nightly  multivitamin with minerals, 1 tablet, Daily  nystatin, 5 mL, q6h  pantoprazole, 40 mg, Daily before breakfast  piperacillin-tazobactam, 3.375 g, q8h  potassium phosphate, 21 mmol, Once  predniSONE, 10 mg, Daily  rosuvastatin, 40 mg, Nightly  sennosides-docusate sodium, 1 tablet, Nightly  [Held by provider] sirolimus, 3 mg, Daily  [Held by provider] sulfamethoxazole-trimethoprim, 80 mg of trimethoprim, Daily  tacrolimus, 1 mg, q12h TRICIA  traZODone, 50 mg, Nightly  valGANciclovir, 450 mg, q48h      PRN:  acetaminophen, 975 mg, q8h PRN  calcium carbonate, 1,500 mg, q5 min PRN  dextrose, 25 g, q15 min PRN  dextrose, 25 g, q15 min PRN   Or  glucagon, 1 mg, q15 min PRN  diphenhydrAMINE, 25 mg, q5 min PRN  glucagon, 1 mg, q15 min PRN  heparin, 2,000-4,000 Units, q4h PRN  HYDROmorphone, 0.2 mg, q3h PRN  artificial tears, 2 drop, PRN  microfibrllar collagen, , PRN  mupirocin, , BID PRN  OLANZapine, 2.5 mg, BID PRN  ondansetron, 4 mg, q4h PRN  oxyCODONE, 5 mg, q4h PRN  polyethylene glycol, 17 g, Daily PRN  sodium chloride, 1 spray, 4x daily PRN  traZODone, 25 mg, Nightly PRN  vancomycin, , Daily PRN        Impella:      Most Recent Range Past 24hrs   Performance Level 6 P Level  Min: 6   Min taken time: 03/16/24 1400  Max: 8   Max taken time: 03/16/24 0600   Flow (L/min) 3.5 Flow (L/min)  Min: 3.5   Min taken time: 03/16/24 1400  Max: 5   Max taken time: 03/15/24 2200   Motor Current 336/252 Motor Current  Min: 332/256   Min taken time: 03/16/24 1000  Max: 518/423   Max taken time: 03/15/24 2200   Placement Signal Yes  Placement OK could not be evaluated. This SmartLink does not work with rows of the type: Custom List   Purge (mmHg) 493 Purge Pressure (mmHg)  Min: 431   Min taken time: 03/16/24 1300  Max: 560   Max taken time: 03/15/24 1900   Purge rate (mL/hr) 10.7 Purge Rate (mL/hr)  Min: 9   Min taken time: 03/16/24 0400  Max: 11   Max taken time: 03/16/24 0100       PHYSICAL EXAM:  "  Visit Vitals  BP 86/72   Pulse (!) 130   Temp 36.8 °C (98.2 °F)   Resp (!) 29   Ht 1.549 m (5' 0.98\")   Wt 97.2 kg (214 lb 4.6 oz)   SpO2 98%   BMI 40.51 kg/m²   OB Status Hysterectomy   Smoking Status Never   BSA 2.05 m²     Wt Readings from Last 5 Encounters:   03/16/24 97.2 kg (214 lb 4.6 oz)   12/07/23 92.1 kg (203 lb)   12/01/23 93 kg (205 lb)   11/29/23 92.9 kg (204 lb 12.8 oz)   11/09/23 91.3 kg (201 lb 3.2 oz)     INTAKE/OUTPUT:  I/O last 3 completed shifts:  In: 1996.6 (20 mL/kg) [I.V.:1696.6 (17 mL/kg); IV Piggyback:300]  Out: 5900 (59.2 mL/kg) [Other:5900]  Dosing Weight: 99.6 kg        Physical Exam  Constitutional:       General: She is not in acute distress.     Appearance: Normal appearance. She is not ill-appearing.   HENT:      Head: Normocephalic.      Mouth/Throat:      Mouth: Mucous membranes are moist.   Eyes:      Extraocular Movements: Extraocular movements intact.      Pupils: Pupils are equal, round, and reactive to light.   Neck:      Comments: RIJ dialysis line present   Cardiovascular:      Rate and Rhythm: Regular rhythm. Tachycardia present.      Pulses: Normal pulses.      Heart sounds: Normal heart sounds.   Pulmonary:      Effort: Pulmonary effort is normal. No respiratory distress.      Breath sounds: Normal breath sounds.   Chest:      Comments: Right axillary impella site CDI   Abdominal:      General: Bowel sounds are normal.      Palpations: Abdomen is soft.      Tenderness: There is no abdominal tenderness.   Musculoskeletal:         General: Normal range of motion.      Cervical back: Normal range of motion.      Right lower leg: Edema present.      Left lower leg: Edema present.   Skin:     General: Skin is warm.      Capillary Refill: Capillary refill takes 2 to 3 seconds.      Comments: R femoral SGC + cordis CDI site c/d/I line now removed     Left groin ECMO cannulation site with drainage    Neurological:      General: No focal deficit present.      Mental Status: She " is alert and oriented to person, place, and time.   Psychiatric:         Behavior: Behavior normal. Behavior is cooperative.       DATA:  CMP:  Results from last 7 days   Lab Units 03/16/24  0813 03/16/24  0528 03/15/24  1217 03/15/24  0810 03/15/24  0324 03/14/24  0623 03/13/24  0558 03/12/24  0439 03/11/24  0014 03/10/24  1214 03/10/24  0552 03/09/24  1641 03/09/24  1641   SODIUM mmol/L 129* 131* 127* 128* 128* 126* 133* 130* 130* 132* 132*  --  135*   POTASSIUM mmol/L 2.7* 2.6* 3.8 3.7 4.0 3.7 3.8 4.8 3.7 4.1 3.7  --  3.9   CHLORIDE mmol/L 93* 94* 92* 93* 93* 88* 93* 89* 91* 91* 93*  --  95*   CO2 mmol/L 25 26 25 25 25 26 28 24 25 25 27  --  28   ANION GAP mmol/L 14 14 14 14 14 16 16 22* 18 20 16  --  16   BUN mg/dL 5* 3* 14 12 7 34* 26* 43* 31* 28* 25*  --  19   CREATININE mg/dL 0.75 0.51 1.99* 1.73* 1.36* 4.97* 4.37* 5.77* 4.57* 3.61* 3.40*  --  2.41*   EGFR mL/min/1.73m*2 >90 >90 33* 40* 53* 11* 13* 9* 12* 16* 18*  --  27*   MAGNESIUM mg/dL  --  2.11 3.04* 2.38 1.83 1.92 2.09 2.11  --  2.23 1.96  --  1.96   ALBUMIN g/dL 3.2* 3.3* 3.3* 3.1* 3.3* 3.2* 3.4 3.6  3.6 3.8 4.1 3.7  --  4.0   ALT U/L 80* 82* 60* 52* 51* 27 33 24 9  --  9   < >  --    AST U/L 343* 340* 259* 222* 215* 85* 89* 52* 44*  --  45*   < >  --    BILIRUBIN TOTAL mg/dL 3.9* 3.4* 2.9* 2.6* 2.4* 1.5* 1.3* 0.8 0.9  --  0.9   < >  --     < > = values in this interval not displayed.       CBC:  Results from last 7 days   Lab Units 03/16/24  0528 03/15/24  1217 03/15/24  0810 03/15/24  0324 03/14/24  2045 03/14/24  1528 03/14/24  0623 03/13/24  2322   WBC AUTO x10*3/uL 6.1 6.9 5.0 4.9 4.5 4.6 3.4* 3.3*   HEMOGLOBIN g/dL 7.1* 7.6* 6.4* 6.8* 7.4* 7.5* 7.3* 7.3*   HEMATOCRIT % 20.9* 22.7* 19.5* 19.9* 22.9* 22.7* 23.1* 20.8*   PLATELETS AUTO x10*3/uL 47* 54* 52* 58* 52* 49* 114* 111*   MCV fL 91 92 94 89 94 93 94 88       COAG:   Results from last 7 days   Lab Units 03/16/24  0528 03/14/24  2214 03/14/24  0623 03/13/24  0558 03/11/24  0014 03/10/24  0552  03/09/24  1641   INR  2.1* 1.8* 1.5* 1.3* 1.1 1.2* 1.1       ABO:   ABO TYPE   Date Value Ref Range Status   03/15/2024 O  Final       HEME/ENDO:         CARDIAC:   Results from last 7 days   Lab Units 03/16/24  0813 03/16/24  0528 03/15/24  1637 03/15/24  1217 03/15/24  0810 03/15/24  0324 03/14/24  2045 03/14/24  0623   LD U/L 2,167* 2,139* 1,816* 1,576* 1,441* 1,431* 1,316* 654*         ASSESSMENT AND PLAN:   Charla Bowles is a 34 y/o F with PMHx of heart transplant in March 2022 with postoperative course c/b upper extremity/internal jugular DVTs, and 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 She was admitted to HFICU on 2/15 with concern for cardiogenic shock 2/2 allograft rejection and decompensated heart failure with multiorgan dysfunction including significant elevation of liver enzymes and nonoliguric acute kidney injury. Endomyocardial biopsy on 2/16 revealed mild ACR with +CD4s and negative HLAs, however multisystem organ failure persisted with increased inotropic requirements. IABP placed on 2/18. Transferred to CTICU on 2/19 for [facility] ECMO cannulation with Dr. Marina for persistent multi-organ system dysfunction. Intubated 2/21 for respiratory failure 2/2 pulmonary edema, extubated 2/24. ECMO de-cannulated 2/29/24 but had worsening HIRAM requiring CRRT and overall declined clinical status and IABP was transitioned to R axillary impella 5.5 on 3/1. Transferred from CTICU to HFICU on 3/10 for further management. Continued on milrinone gtt @ 0.25 mcg/kg/min and impella support decreased to P-level 5 on 3/11. Completed PLEX/IVIG on 3/13. Went for RHC 3/13: RA: 16, RV: 43/1, PA: 43/12, PCWP: 23, PA-SAT: 47%, CO: 5.14, CI: 2.64 on P5 of Impella 5.5 and milrinone 0.25 mcg/kg/min. Impella was increased to P6 shortly after, and overnight 3/14 patient's mixed venous dropped from 58->32, CXR was ordered and SGC was in appropriate position. Repeat mixed venous confirmed low value of 37. Impella increased to P8 and stat  ECHO ordered 3/14. WBC continued to down trend 3/14 which prompted new infectious workup to be sent, and patient to be started on broad spectrum abx. Patient transitioned to tablo from iHD for better volume removal given elevated CVPs 3/14. 03/15: Malpositioned Impella adjusted at the bedside under echo by Dr. Marina and Dr. Melo. Impella pulled back ~ 1 - 2 cm and confirmed placement, pushed back in 1 cm in the evening by Dr Lewis for placement alarms. Continue TABLO therapy today with goal > 2 liters removal.  [place] removed this AM 3/16 and P level decreased to 6 due to continued high hemolysis. Plan for tablo again overnight for 2L volume removal.     NEURO:   #Acute Pain   #Insomnia  - Continue tylenol 975 mg Q8 PRN for mild pain   - Continue oxycodone 5 mg Q4 PRN for moderate pain   - Continue Diluadid 0.2 mg q3 PRN for severe pain   - Lidocaine patch PRN   - Continue melatonin 10 mg nightly   - Continue trazodone 50 mg nightly for insomnia  - PT/OT   - CAM ICU score qshift  - Sleep/wake cycle hygiene  - Serial neuro and pain assessments     ENT:  #Epistaxis  - Significant epistaxis 2/28 and 3/3 requiring ENT consult, packing removed by ENT 3/5  - S/p ocean spray 5x day x10 days completed   - Ocean spray and mupiricin PRN for dry nasal passages     CARDIAC:  #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)  Toxo: -/-   Hep C: -/-     Rejection/Prophylaxis (transplant):  - Steroids: 10 mg PO prednisone daily  - Tacrolimus: 1 mg PO @ 0630 & 1 mg PO @ 1830 w/ daily levels drawn @ 0600  - Sirolimus: Holding AM dose 3/15 until labs are back w/ daily levels drawn at 0600 (last increase 3/10 to 3 mg daily)  - Tacrolimus goal troughs: 3-5, daily level 3/16: 4.9  - Sirolimus goal troughs: 7-9, daily level 3/16: PENDING  - Combined sirolimus/tacrolimus goal of 10-12  - Antifungals: nystatin oral suspension 5 mls q6  - Antivirals: Valcyte 450 mg q48  due to low grade viremia   - Anti PCP & Toxoplasmosis: Bactrim SS daily --> holding due to thrombocytopenia (2/23)     Last cardiac biopsy: 2/16/24 with ACR1 and no AMR  Last HLA (2/16/24): negative for DSAs   Last RHC (3/13/24): RA: 16, RV: 43/1, PA: 43/12, PCWP: 23, PA-SAT: 47%, CO: 5.14, CI: 2.64 on P5 of Impella 5.5 and milrinone 0.25 mcg/kg/min   Last LHC (2/18/24): negative for CAV and CAD   Last TTE/BOB (3/1/24): shows LVEF to 30% and mild RV dysfunction (intra-op impella 5.5 insert)  Osteopenia/osteoporosis prophylaxis: Vitamin D3 and calcium supplements  Peptic/gastric ulcer prophylaxis: Pantoprazole 40 mg daily   CAV Prophylaxis: Aspirin 81 mg daily & rosuvastatin 40 mg nightly     - Unclear cause of acute severe graft dysfunction. Most likely smoldering ACR vs. AMR; however, 2x allograft biopsies on 2/16 & 2/20 remain negative for any significant pathology. Notably, both biopsies taken after rejection therapies implemented which may have reduced areas of graft damage.    - DSAs remain negative; however, patient may have non-HLA antibodies present. 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   - Negative CAV & CAD via LHC on 2/18/24   - Completed methylpred steroid pulse w/ 1g q24 x 3 days (2/16-2/18) and prednisone taper   - Thymoglobulin doses: 2/18 & 2/19   - IVIG + PLEX Sessions completed: 2/18, 2/20, 3/7, 3/11, 3/13  - Graft function slightly worse after transition to impella 5.5 on 3/1. 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  #Impella 5.5 support (3/1/24)  RHC (3/13/24): RA: 16, RV: 43/1, PA: 43/12, PCWP: 23, PA-SAT: 47%, CO: 5.14, CI: 2.64 on P5 of Impella 5.5, milrinone 0.25 mcg/kg/min, hydralazine 100 mg q8, isordil 40 mg q8  Opening SGC #s (3/13): BP 94/71 (79), PAP 42/30 (35), CVP 13, , CO/CI (kyra) 5.46/2.80, SVO2 56% on P5 of Impella 5.5,  milrinone 0.25 mcg/kg/min, hydralazine 100 mg q8, isordil 40 mg q8  Daily/ Closing SGC #s (3/16): /86 (97), CVP 20, PAP 35/25 (42), SVR 1115, CO/CI (kyra) 5.5/2.8, SVO2 51% on P7 of Impella 5.5, milrinone 0.25 mcg/kg/min, hydralazine 50 mg q8, isordil 20 mg q8  - Maintain goal MAP 70-90  - Impella P level to 6, wean per clinical picture / hemodynamics   - Continue sodium bicarb purge for Impella   - Continue milrinone 0.25 mcg/kg/min  - Continue afterload reduction with PO hydralazine 50 mg q8 + PO isosorbide 20 mg q8 (decreased 03/14)  - Continue ASA - Currently HELD 2/2 low platelet count  - Continue rosuvastatin 40 mg daily   - S/p Digoxin 125 mcg PO (3/12, 3/13), digoxin level (3/14): 0.71 - currently discontinued - will readdress for RV protection / HR  - Tablo tonight 3/16 for volume removal, goal to remove > 2 liters   - STAT ECHO completed 03/14: LV systolic function is severely decreased with a 25-30% estimated ejection fraction; There is mildly reduced right ventricular systolic function; Moderate mitral valve regurgitation; Moderate to severe tricuspid regurgitation visualized; There is global hypokinesis of the left ventricle with minor regional variations.    #Advanced therapy evaluation  - Presented to committee 3/12/2024 - concern for end organ failure (possible heart / kidney)   - Not a candidate for transplant at this time per committee discussion 3/12/24     PULM:   #Acute Hypoxic Respiratory Failure 2/2 pulmonary edema (resolved)  ETT (2/21-2/24)  Remains on RA   - Maintain SpO2 > 92%     GI:   #Nausea  #Constipation   - CT chest/abd/pelvis (3/14): no acute abdomen, changes consistent with pleural effusions  - R sided flank pain restarted this afternoon, low threshold for repeat scan if pain is not improving this evening 3/16  - C/w Zofran PRN   - Initiated Olanzapine 2.5 mg IM PRN   - C/w Patti-Colace nightly standing dose / Miralax PRN    #Hx of gastric bypass   #Hx of MMF colitis  #Acute  transaminitis   - Continue home PPI, calcitriol 0.5 mg daily, multivitamin  - Continue miralax prn & christopher-colace daily   - Trend LFTs daily - worsening      :   #Acute Renal Failure 2/2 to cardiorenal syndrome (on IHD)  Baseline Cr 1.2-1.3  CVVH stopped 2/27/24  - RFP daily and PRN  - Tablo for more gently volume removal, goal > 2.0 L off today 3/16 - discussed with nephrology will do overnight   - Transplant nephrology following closely, appreciate assistance     ENDO:   #T2DM  Steroid induced hyperglycemia acceptable glycemic control on SSI  - Maintain BG <180 with hypoglycemia protocol  - Continue SSI     #Hypothyroidism  TSH (3/7): 14.87, T4 1.37, T3 60  - Continue synthroid 200 mcg daily, redraw TSH on 3/18 (ordered)  - Endocrine following, appreciate assistance      HEME:   #Acute DVT LIJ and SVT left cephalic vein and right cephalic vein   #Iron deficiency anemia  #Acute blood loss anemia   #Multiple transfusions   #Hemolysis   UE and LE Venous duplex (3/6/24): right acute occlusive superficial venous thrombosis visualized in the mid cephalic and acute occlusive superficial venous thrombosis visualized in the distal cephalic veins, left acute non-occlusive deep vein thrombosis visualized in the internal jugular and acute non-occlusive superficial venous thrombosis visualized in the mid cephalic veins   UE and LE Venous duplex (3/12): unchanged from prior  Last type and screen: 3/15  - 03/15: Transfusing 1 unit leukocyte reduced PRBC's for tx of Hgb 6.4   - Restart heparin gtt -> low intensity per Dr Melo  - ASA on hold d/t low platelet count   - Continue SCDs for DVT prophylaxis  - Continue Aranesp every 2 weeks  - Vascular medicine signed off 3/13, will need discussion regarding long term anticoagulation closer to discharge   - Consult hematology regarding DIC picture 3/16 appreciate recs      ID:   #Leukopenia, likely in the setting of IVIG vs infectious process (resolved)  Blood cultures (2/15):  NGTD  Afebrile, nontoxic  - Send infectious workup: blood cultures + respiratory culture - PENDING - NGTD 03/16  - Start broad spectrum abx: vancomycin + zosyn (3/14-*)  - Follow lactate: 03/16: 1  - Left groin ECMO cannulation site - wound now draining - wound care consult placed - cultures sent - NGTD  - Trend temp q4h     PHYSICAL AND OCCUPATIONAL THERAPY: ordered and following     LINES:  PIVs  RIJ trialysis catheter 3/5  R Axillary Impella 3/1    DVT: SCDs, heparin gtt  VAP BUNDLE: N/A  ULCER PPX: PPI  GLYCEMIC CONTROL: SSI  BOWEL CARE: Patti-colace, miralax    INDWELLING CATHETER: NA  NUTRITION: Adult diet Regular      EMERGENCY CONTACT: Extended Emergency Contact Information  Primary Emergency Contact: SAHIL DIMAS  Address: [address] [place] [address]  Home Phone: [phone]  Mobile Phone: [phone]  Relation: Mother  FAMILY UPDATE: given at bedside  CODE STATUS: Full Code  DISPO: remain in HFICU    Patient seen and assessed with Dr. Melo   _________________________________________________  JIMMY Musa-CNP

## 2024-03-16 NOTE — SIGNIFICANT EVENT
Called to evaluate Impella position given intermittent Ao placement signal. Initial echo with shallow position of Impella inpet 2.5 cm from aortic valve plane, advanced 1 cm from 44 cm to 45 cm and now ~3.5cm under echo guidance, no arrhythmias or alarms.    Discussed with Dr Marina and Dr Melo.

## 2024-03-16 NOTE — PROGRESS NOTES
Pharmacy to Dose Vancomycin:  Charla Bowles is a 34 y.o. female ordered pharmacy to dose vancomycin for  line infection . Today is day 3 of therapy.  Goal: Trough 15-20mg/L  Results from last 7 days   Lab Units 03/16/24  0813 03/16/24  0528 03/15/24  1217 03/15/24  0810   BUN mg/dL 5* 3* 14 12   CREATININE mg/dL 0.75 0.51 1.99* 1.73*   WBC AUTO x10*3/uL  --  6.1 6.9 5.0   VANCOMYCIN RM ug/mL 13.6  --   --   --       Blood Culture   Date/Time Value Ref Range Status   03/14/2024 12:36 PM No growth at 1 day  Preliminary   03/14/2024 12:36 PM No growth at 1 day  Preliminary     Tissue/Wound Culture/Smear   Date/Time Value Ref Range Status   03/15/2024 12:15 PM No growth to date  Preliminary     Gram Stain   Date/Time Value Ref Range Status   03/15/2024 12:15 PM (1+) Rare Polymorphonuclear leukocytes  Preliminary   03/15/2024 12:15 PM No organisms seen  Preliminary     C. difficile Toxin, PCR   Date/Time Value Ref Range Status   08/08/2022 10:19 PM NOT DETECTED Not Detected Final     Comment:      This assay detects the presence of the tcdB (toxin B)   gene via DNA amplification, and results should be   interpreted in the context of the patients history   and clinical findings. This test cannot be performed   on formed stools or used as a test of cure, and should   not be performed more than once per 7 days.        Renal function is improving. Level today 13.6 drawn 22hrs after 1.5G dose.    Plan:  Give vanco 1.75G x 1 dose today.  Follow-up level ordered for 3/17 @ 1000 unless clinically indicated sooner.  Pharmacy will continue daily vancomycin monitoring. Please contact the pharmacy with any questions.    Thank you,  Dougie Morelos RPh

## 2024-03-16 NOTE — SIGNIFICANT EVENT
Closing swan numbers     /86 (97), CVP 20, PAP 35/25 (42), SVR 1115, CO/CI (kyra) 5.5/2.8, SVO2 51% on P7 of Impella 5.5, milrinone 0.25 mcg/kg/min, hydralazine 50 mg q8, isordil 20 mg q8
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
## 2024-03-17 NOTE — PROGRESS NOTES
[facility]  HFICU PROGRESS NOTE    Charla Bowles/[id number]    Admit Date: 2/15/2024  Hospital Length of Stay: 31   ICU Length of Stay: 6d 23h   Primary Service: HFICU  Primary HF Cardiologist: Dr. Cornell  Referring: Dr Cornell     INTERVAL EVENTS / PERTINENT ROS:       Impella with continued intermittent alarms for malposition that resolves spontaneously. Her LDH has plateaued at 2,111. Her lactate has been stable at 0.7 (03/17).  Electrolytes replaced (Calcium gluconate, Kphos, Magnesium). Throughout the day, here right sided ABD pain continues and was taken for a STAT CT scan chest/abd/pelvis w/o contrast.  Upon returning to her room, she had small emesis x 1 (dark blood ~ 1/4 cup). GI consulted (discussed concerns) and she is now placed on a pantoprazole gtt. Heparin gtt stopped. She had multiple episodes of diarrhea overnight and sample sent for C-diff eval. Blood cultures (sent: 03/14) w/ NGTD and wound culture (sent: 03/15) w/ NGTD; therefore, Vanco and Zosyn stopped. Her P-level was decreased to P-5 this AM.       Plan:  - Reduce Impella support to P-5  - Continue inotrope support Milrinone @ 0.25 mcg/kg/min  - TABLO tonight > 2 Liter fluid removal goal - obtain lactate half way through treatment - ordered for 2100     - Repeat CT scan chest, ABD, pelvis 2/2 un-resolving pain   - Tacrolimus level (3/17):  3.9  - Sirolimus level (3/17): 7.9 hold AM dose 3/18 until labs are back   - GI consulted for dark blood emesis  - Pantoprazole gtt initiated     MEDICATIONS  Infusions:  heparin, Last Rate: Stopped (03/17/24 1453)  lactated Ringer's, Last Rate: 10 mL/hr (03/16/24 0400)  milrinone, Last Rate: 0.25 mcg/kg/min (03/17/24 0833)  pantoprazole (ProtoNix) infusion  sodium bicarbonate infusion Impella Purge 25 mEq/1000 mL D5W, Last Rate: 10 mL/hr (03/17/24 5696)      Scheduled:  [Held by provider] aspirin, 81 mg, Daily  calcitriol, 0.5 mcg, Daily  calcium gluconate, 2 g,  Once  darbepoetin floyd, 100 mcg, q14 days  ferrous sulfate (325 mg ferrous sulfate), 65 mg of iron, Daily with breakfast  hydrALAZINE, 50 mg, q8h  insulin lispro, 0-10 Units, Before meals & nightly  isosorbide dinitrate, 20 mg, q8h  lactated Ringer's, 250 mL, Once  levothyroxine, 200 mcg, Daily  lidocaine, 1 patch, Daily  melatonin, 10 mg, Nightly  midazolam, ,   midazolam, 0.5 mg, Once  multivitamin with minerals, 1 tablet, Daily  nystatin, 5 mL, q6h  predniSONE, 10 mg, Daily  [Held by provider] rosuvastatin, 40 mg, Nightly  [Held by provider] sirolimus, 3 mg, Daily  [Held by provider] sulfamethoxazole-trimethoprim, 80 mg of trimethoprim, Daily  tacrolimus, 1 mg, q12h TRICIA  traZODone, 50 mg, Nightly  valGANciclovir, 450 mg, q48h      PRN:  acetaminophen, 975 mg, q8h PRN  benzocaine-menthol, 1 lozenge, q2h PRN  calcium carbonate, 1,500 mg, q5 min PRN  dextrose, 25 g, q15 min PRN  dextrose, 25 g, q15 min PRN   Or  glucagon, 1 mg, q15 min PRN  diphenhydrAMINE, 25 mg, q5 min PRN  glucagon, 1 mg, q15 min PRN  heparin, 2,000-4,000 Units, q4h PRN  HYDROmorphone, 0.2 mg, q3h PRN  artificial tears, 2 drop, PRN  microfibrllar collagen, , PRN  midazolam, ,   mupirocin, , BID PRN  OLANZapine, 2.5 mg, BID PRN  ondansetron, 4 mg, q4h PRN  oxyCODONE, 5 mg, q4h PRN  polyethylene glycol, 17 g, Daily PRN  sennosides-docusate sodium, 1 tablet, Nightly PRN  sodium chloride, 1 spray, 4x daily PRN  traZODone, 25 mg, Nightly PRN  vancomycin, , Daily PRN        Impella:      Most Recent Range Past 24hrs   Performance Level 6 P Level  Min: 6   Min taken time: 03/17/24 1100  Max: 6   Max taken time: 03/17/24 1100   Flow (L/min) 3.4 Flow (L/min)  Min: 3.3   Min taken time: 03/17/24 1000  Max: 3.8   Max taken time: 03/17/24 0700   Motor Current 280/224 Motor Current  Min: 279/219   Min taken time: 03/17/24 1000  Max: 381/287   Max taken time: 03/17/24 0300   Placement Signal Yes  Placement OK could not be evaluated. This SmartLink does not  "work with rows of the type: Custom List   Purge (mmHg) 460 Purge Pressure (mmHg)  Min: 429   Min taken time: 03/17/24 0000  Max: 640   Max taken time: 03/16/24 2200   Purge rate (mL/hr) 11 Purge Rate (mL/hr)  Min: 10.7   Min taken time: 03/17/24 0300  Max: 12.1   Max taken time: 03/17/24 0400       PHYSICAL EXAM:   Visit Vitals  BP 81/55   Pulse (!) 130   Temp 36.8 °C (98.2 °F) (Temporal)   Resp 20   Ht 1.549 m (5' 0.98\")   Wt 106 kg (233 lb 11 oz)   SpO2 96%   BMI 44.18 kg/m²   OB Status Hysterectomy   Smoking Status Never   BSA 2.14 m²     Wt Readings from Last 5 Encounters:   03/17/24 106 kg (233 lb 11 oz)   12/07/23 92.1 kg (203 lb)   12/01/23 93 kg (205 lb)   11/29/23 92.9 kg (204 lb 12.8 oz)   11/09/23 91.3 kg (201 lb 3.2 oz)     INTAKE/OUTPUT:  I/O last 3 completed shifts:  In: 2283.5 (22.9 mL/kg) [P.O.:220; I.V.:551.5 (5.5 mL/kg); Blood:562; IV Piggyback:950]  Out: 5000 (50.2 mL/kg) [Other:5000]  Dosing Weight: 99.6 kg        Physical Exam  Constitutional:       General: She is not in acute distress.     Appearance: Normal appearance. She is not ill-appearing.   HENT:      Head: Normocephalic.      Mouth/Throat:      Mouth: Mucous membranes are moist.   Eyes:      Extraocular Movements: Extraocular movements intact.      Pupils: Pupils are equal, round, and reactive to light.   Neck:      Comments: RIJ dialysis line present   Cardiovascular:      Rate and Rhythm: Regular rhythm. Tachycardia present.      Pulses: Normal pulses.      Heart sounds: Normal heart sounds.   Pulmonary:      Effort: Pulmonary effort is normal. No respiratory distress.      Breath sounds: Normal breath sounds.   Chest:      Comments: Right axillary impella site CDI   Abdominal:      General: Bowel sounds are normal.      Palpations: Abdomen is soft.      Tenderness: There is no abdominal tenderness.   Musculoskeletal:         General: Normal range of motion.      Cervical back: Normal range of motion.      Right lower leg: Edema " present.      Left lower leg: Edema present.   Skin:     General: Skin is warm.      Capillary Refill: Capillary refill takes 2 to 3 seconds.      Comments: R femoral SGC + cordis CDI site c/d/I line now removed     Left groin ECMO cannulation site with drainage    Neurological:      General: No focal deficit present.      Mental Status: She is alert and oriented to person, place, and time.   Psychiatric:         Behavior: Behavior normal. Behavior is cooperative.       DATA:  CMP:  Results from last 7 days   Lab Units 03/17/24  0025 03/16/24  1433 03/16/24  0813 03/16/24  0528 03/15/24  1217 03/15/24  0810 03/15/24  0324 03/14/24  0623 03/13/24  0558 03/12/24  0439   SODIUM mmol/L 130* 125* 129* 131* 127* 128* 128* 126* 133* 130*   POTASSIUM mmol/L 3.5 3.5 2.7* 2.6* 3.8 3.7 4.0 3.7 3.8 4.8   CHLORIDE mmol/L 93* 90* 93* 94* 92* 93* 93* 88* 93* 89*   CO2 mmol/L 28 25 25 26 25 25 25 26 28 24   ANION GAP mmol/L 13 14 14 14 14 14 14 16 16 22*   BUN mg/dL 2* 9 5* 3* 14 12 7 34* 26* 43*   CREATININE mg/dL 0.31* 1.27* 0.75 0.51 1.99* 1.73* 1.36* 4.97* 4.37* 5.77*   EGFR mL/min/1.73m*2 >90 57* >90 >90 33* 40* 53* 11* 13* 9*   MAGNESIUM mg/dL 1.87 1.96  --  2.11 3.04* 2.38 1.83 1.92 2.09 2.11   ALBUMIN g/dL 3.4 3.3* 3.2* 3.3* 3.3* 3.1* 3.3* 3.2* 3.4 3.6  3.6   ALT U/L 84* 79* 80* 82* 60* 52* 51* 27 33 24   AST U/L 295* 323* 343* 340* 259* 222* 215* 85* 89* 52*   BILIRUBIN TOTAL mg/dL 3.6* 4.2* 3.9* 3.4* 2.9* 2.6* 2.4* 1.5* 1.3* 0.8     CBC:  Results from last 7 days   Lab Units 03/17/24  1444 03/17/24  0025 03/16/24  1433 03/16/24  0528 03/15/24  1217 03/15/24  0810 03/15/24  0324 03/14/24  2045   WBC AUTO x10*3/uL 8.9 7.4 7.7 6.1 6.9 5.0 4.9 4.5   HEMOGLOBIN g/dL 7.7* 8.6* 8.1* 7.1* 7.6* 6.4* 6.8* 7.4*   HEMATOCRIT % 23.5* 24.1* 23.1* 20.9* 22.7* 19.5* 19.9* 22.9*   PLATELETS AUTO x10*3/uL 99* 62* 68* 47* 54* 52* 58* 52*   MCV fL 95 87 89 91 92 94 89 94     COAG:   Results from last 7 days   Lab Units 03/17/24  0025  03/16/24  1433 03/16/24  0528 03/14/24  2214 03/14/24  0623 03/13/24  0558 03/11/24  0014   INR  2.2* 2.2* 2.1* 1.8* 1.5* 1.3* 1.1     ABO:   ABO TYPE   Date Value Ref Range Status   03/15/2024 O  Final       HEME/ENDO:         CARDIAC:   Results from last 7 days   Lab Units 03/17/24  0025 03/16/24  1433 03/16/24  0813 03/16/24  0528 03/15/24  1637 03/15/24  1217 03/15/24  0810 03/15/24  0324   LD U/L 2,111* 2,170* 2,167* 2,139* 1,816* 1,576* 1,441* 1,431*       ASSESSMENT AND PLAN:   Charla Bowles is a 32 y/o F with PMHx of heart transplant in March 2022 with postoperative course c/b upper extremity/internal jugular DVTs, and 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 She was admitted to HFICU on 2/15 with concern for cardiogenic shock 2/2 allograft rejection and decompensated heart failure with multiorgan dysfunction including significant elevation of liver enzymes and nonoliguric acute kidney injury. Endomyocardial biopsy on 2/16 revealed mild ACR with +CD4s and negative HLAs, however multisystem organ failure persisted with increased inotropic requirements. IABP placed on 2/18. Transferred to CTICU on 2/19 for [facility] ECMO cannulation with Dr. Marina for persistent multi-organ system dysfunction. Intubated 2/21 for respiratory failure 2/2 pulmonary edema, extubated 2/24. ECMO de-cannulated 2/29/24 but had worsening HIRAM requiring CRRT and overall declined clinical status and IABP was transitioned to R axillary impella 5.5 on 3/1. Transferred from CTICU to HFICU on 3/10 for further management. Continued on milrinone gtt @ 0.25 mcg/kg/min and impella support decreased to P-level 5 on 3/11. Completed PLEX/IVIG on 3/13. Went for RHC 3/13: RA: 16, RV: 43/1, PA: 43/12, PCWP: 23, PA-SAT: 47%, CO: 5.14, CI: 2.64 on P5 of Impella 5.5 and milrinone 0.25 mcg/kg/min. Impella was increased to P6 shortly after, and overnight 3/14 patient's mixed venous dropped from 58->32, CXR was ordered and SGC was in appropriate position. Repeat  mixed venous confirmed low value of 37. Impella increased to P8 and stat ECHO ordered 3/14. WBC continued to down trend 3/14 which prompted new infectious workup to be sent, and patient to be started on broad spectrum abx. Patient transitioned to tablo from iHD for better volume removal given elevated CVPs 3/14. 03/15: Malpositioned Impella adjusted at the bedside under echo by Dr. Marina and Dr. Melo. Impella pulled back ~ 1 - 2 cm and confirmed placement, pushed back in 1 cm in the evening by Dr Lewis for placement alarms. Continue TABLO therapy today with goal > 2 liters removal.  [place] removed this AM 3/16 and P level decreased to 6 due to continued high hemolysis. Plan for tablo again overnight for 2L volume removal. 03/17: LDH seems to have plateaued @ 2111; P-level decreased to P-5. Plane for Tablo again this evening w/ goal net negative 2 liters. CT scan chest, abd, pelvis complete.     Plan:  - Reduce Impella support to P-5  - Continue inotrope support Milrinone @ 0.25 mcg/kg/min  - TABLO tonight > 2 Liter fluid removal goal - obtain lactate half way through treatment - ordered for 2100     - Repeat CT scan chest, ABD, pelvis 2/2 un-resolving pain   - Tacrolimus level (3/17):  3.9  - Sirolimus level (3/17): 7.9 hold AM dose 3/18 until labs are back   - GI consulted for dark blood emesis  - Pantoprazole gtt initiated       NEURO:   #Acute Pain   #Insomnia  - Continue tylenol 975 mg Q8 PRN for mild pain   - Continue oxycodone 5 mg Q4 PRN for moderate pain   - Continue Diluadid 0.2 mg q3 PRN for severe pain   - Lidocaine patch PRN   - Continue melatonin 10 mg nightly   - Continue trazodone 50 mg nightly for insomnia  - PT/OT   - CAM ICU score qshift  - Sleep/wake cycle hygiene  - Serial neuro and pain assessments     ENT:  #Epistaxis  - Significant epistaxis 2/28 and 3/3 requiring ENT consult, packing removed by ENT 3/5  - S/p ocean spray 5x day x10 days completed   - Ocean spray and mupiricin PRN for dry nasal  passages     CARDIAC:  #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)  Toxo: -/-   Hep C: -/-     Rejection/Prophylaxis (transplant):  - Steroids: 10 mg PO prednisone daily  - Tacrolimus: 1 mg PO @ 0630 & 1 mg PO @ 1830 w/ daily levels drawn @ 0600  - Sirolimus: Holding AM dose 3/18 until labs are back w/ daily levels drawn at 0600  - Tacrolimus goal troughs: 3-5, daily level 3/17: 3.9  - Sirolimus goal troughs: 7-9, daily level 3/17: 7.9 - continue to hold per Dr Melo   - Combined sirolimus/tacrolimus goal of 10-12  - Antifungals: nystatin oral suspension 5 mls q6  - Antivirals: Valcyte 450 mg q48 due to low grade viremia   - Anti PCP & Toxoplasmosis: Bactrim SS daily --> holding due to thrombocytopenia (2/23)     Last cardiac biopsy: 2/16/24 with ACR1 and no AMR  Last HLA (2/16/24): negative for DSAs   Last RHC (3/13/24): RA: 16, RV: 43/1, PA: 43/12, PCWP: 23, PA-SAT: 47%, CO: 5.14, CI: 2.64 on P5 of Impella 5.5 and milrinone 0.25 mcg/kg/min   Last LHC (2/18/24): negative for CAV and CAD   Last TTE/BOB (3/1/24): shows LVEF to 30% and mild RV dysfunction (intra-op impella 5.5 insert)  Osteopenia/osteoporosis prophylaxis: Vitamin D3 and calcium supplements  Peptic/gastric ulcer prophylaxis: Pantoprazole 40 mg daily   CAV Prophylaxis: Aspirin 81 mg daily & rosuvastatin 40 mg nightly     - Unclear cause of acute severe graft dysfunction. Most likely smoldering ACR vs. AMR; however, 2x allograft biopsies on 2/16 & 2/20 remain negative for any significant pathology. Notably, both biopsies taken after rejection therapies implemented which may have reduced areas of graft damage.    - DSAs remain negative; however, patient may have non-HLA antibodies present. 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   - Negative CAV & CAD via LHC on 2/18/24   - Completed methylpred steroid pulse w/ 1g q24 x 3 days (2/16-2/18) and prednisone taper   -  Thymoglobulin doses: 2/18 & 2/19   - IVIG + PLEX Sessions completed: 2/18, 2/20, 3/7, 3/11, 3/13  - Graft function slightly worse after transition to impella 5.5 on 3/1. 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  #Impella 5.5 support (3/1/24)  RHC (3/13/24): RA: 16, RV: 43/1, PA: 43/12, PCWP: 23, PA-SAT: 47%, CO: 5.14, CI: 2.64 on P5 of Impella 5.5, milrinone 0.25 mcg/kg/min, hydralazine 100 mg q8, isordil 40 mg q8  Opening SGC #s (3/13): BP 94/71 (79), PAP 42/30 (35), CVP 13, , CO/CI (kyra) 5.46/2.80, SVO2 56% on P5 of Impella 5.5, milrinone 0.25 mcg/kg/min, hydralazine 100 mg q8, isordil 40 mg q8  Daily/ Closing SGC #s (3/16): /86 (97), CVP 20, PAP 35/25 (42), SVR 1115, CO/CI (kyra) 5.5/2.8, SVO2 51% on P7 of Impella 5.5, milrinone 0.25 mcg/kg/min, hydralazine 50 mg q8, isordil 20 mg q8  - Maintain goal MAP 70-90  - Impella P level maintaining at P6, wean per clinical picture / hemodynamics   - Continue sodium bicarb purge for Impella   - Continue milrinone 0.25 mcg/kg/min  - Continue afterload reduction with PO hydralazine 50 mg q8 + PO isosorbide 20 mg q8 Currently holding d/t low BP   - Continue ASA - Currently HELD 2/2 low platelet count  - Continue rosuvastatin 40 mg daily Holding 3/17   - S/p Digoxin 125 mcg PO (3/12, 3/13), digoxin level (3/14): 0.71 - currently discontinued - will readdress for RV protection / HR  - Tablo tonight 3/18 for volume removal, goal to remove > 2 liters (Tablo ran 3/16, 3/17, and tonight 3/18)   - STAT ECHO completed 03/14: LV systolic function is severely decreased with a 25-30% estimated ejection fraction; There is mildly reduced right ventricular systolic function; Moderate mitral valve regurgitation; Moderate to severe tricuspid regurgitation visualized; There is global hypokinesis of the left ventricle with minor regional  variations.    #Advanced therapy evaluation  - Presented to committee 3/12/2024 - concern for end organ failure (possible heart / kidney)   - Not a candidate for transplant at this time per committee discussion 3/12/24     PULM:   #Acute Hypoxic Respiratory Failure 2/2 pulmonary edema (resolved)  ETT (2/21-2/24)  Remains on RA   - Maintain SpO2 > 92%     GI:   #Nausea  #Constipation   #Diarrhea   #Vomiting blood   - CT chest/abd/pelvis (3/14): no acute abdomen, changes consistent with pleural effusions  - R sided flank pain continues this afternoon, repeat CT scan completed w/o contrast -PENDING radiologist read   - C/w Zofran PRN   - C/w Olanzapine 2.5 mg IM PRN   - C/w Christopher-Colace nightly standing dose / Miralax PRN (HOLDING 2/2 diarrhea)  - Patient had emesis x1 with dark blood in vomit -> GI consult placed 3/17 appreciate recs  - Protonix gtt initiated 3/17  - Sent stool for C-Diff -> pending 3/17    #Hx of gastric bypass   #Hx of MMF colitis  #Acute transaminitis   - Continue home PPI, calcitriol 0.5 mg daily, multivitamin  - HOLDING miralax prn & christopher-colace daily   - Trend LFTs daily - worsening      :   #Acute Renal Failure 2/2 to cardiorenal syndrome (on IHD)  Baseline Cr 1.2-1.3  CVVH stopped 2/27/24  - RFP daily and PRN  - Tablo for more gently volume removal, goal > 2.0 L off today 3/16 - discussed with nephrology will do this evening over 10 hours    - (Tablo ran 3/16, 3/17, and tonight 3/18)   - Transplant nephrology following closely, appreciate assistance     ENDO:   #T2DM  Steroid induced hyperglycemia acceptable glycemic control on SSI  - Maintain BG <180 with hypoglycemia protocol  - Continue SSI     #Hypothyroidism  TSH (3/7): 14.87, T4 1.37, T3 60  - Continue synthroid 200 mcg daily, redraw TSH on 3/18 (ordered)  - Endocrine following, appreciate assistance      HEME:   #Acute DVT LIJ and SVT left cephalic vein and right cephalic vein   #Iron deficiency anemia  #Acute blood loss anemia    #Multiple transfusions   #Hemolysis   UE and LE Venous duplex (3/6/24): right acute occlusive superficial venous thrombosis visualized in the mid cephalic and acute occlusive superficial venous thrombosis visualized in the distal cephalic veins, left acute non-occlusive deep vein thrombosis visualized in the internal jugular and acute non-occlusive superficial venous thrombosis visualized in the mid cephalic veins   UE and LE Venous duplex (3/12): unchanged from prior  Last type and screen: 3/15  - 03/15: Transfusing 1 unit leukocyte reduced PRBC's for tx of Hgb 6.4   - Restart heparin gtt on 3/16-> low intensity (HELD d/t vomiting blood 3/17)   - ASA on hold d/t low platelet count   - Continue SCDs for DVT prophylaxis  - Continue Aranesp every 2 weeks  - Vascular medicine signed off 3/13, will need discussion regarding long term anticoagulation closer to discharge   - Consult hematology regarding DIC picture 3/16 appreciate recs      ID:   #Leukopenia, likely in the setting of IVIG vs infectious process (resolved)  Blood cultures (2/15): NGTD  Afebrile, nontoxic  - Send infectious workup: blood cultures + respiratory culture - PENDING - NGTD 03/17  - Start broad spectrum abx: vancomycin + zosyn (3/14-3/17)   - Follow lactate: 03/17: 0.6  - Left groin ECMO cannulation site - wound draining - wound care following appreciate recs - cultures sent - NGTD  - Trend temp q4h     PHYSICAL AND OCCUPATIONAL THERAPY: ordered and following     LINES:  PIVs  RIJ trialysis catheter 3/5  R Axillary Impella 3/1    DVT: SCDs  VAP BUNDLE: N/A  ULCER PPX: PPI  GLYCEMIC CONTROL: SSI  BOWEL CARE: Patti-colace, miralax    INDWELLING CATHETER: NA  NUTRITION: Adult diet Regular      EMERGENCY CONTACT: Extended Emergency Contact Information  Primary Emergency Contact: SAHIL DIMAS  Address: [address] [place] [address] of [facility]  Home Phone: [phone]  Mobile Phone: [phone]  Relation:  Mother  FAMILY UPDATE: given at bedside  CODE STATUS: Full Code  DISPO: remain in HFICU    Patient seen and assessed with Dr. Melo   _________________________________________________  Anuj Pierre, APRN-CNP

## 2024-03-17 NOTE — PROGRESS NOTES
Charla Bowles is a 34 y.o. female on day 31 of admission presenting with Cardiogenic shock (CMS/[facility]).    Attending Provider Leroy Melo MD    Daily Progress Note    Interval Events   NAEON. Received 1 unit of RBC with adequate response. Plt count also spontaneously adam to 62.    Subjective   Patient has no complaints. No bleeding.    Review of Systems   12 Point ROS negative, except as above     Allergies     Allergies   Allergen Reactions    Mycophenolate Mofetil Rash, Anaphylaxis and Other    Dapagliflozin GI bleeding and Bleeding     UTI    Urinary tract infection    Empagliflozin Unknown    Topiramate Nausea Only and Nausea/vomiting    Latex Rash       Medications   [Held by provider] aspirin, 81 mg, Daily  calcitriol, 0.5 mcg, Daily  calcium gluconate, 2 g, Once  darbepoetin floyd, 100 mcg, q14 days  ferrous sulfate (325 mg ferrous sulfate), 65 mg of iron, Daily with breakfast  hydrALAZINE, 50 mg, q8h  insulin lispro, 0-10 Units, Before meals & nightly  isosorbide dinitrate, 20 mg, q8h  lactated Ringer's, 250 mL, Once  levothyroxine, 200 mcg, Daily  lidocaine, 1 patch, Daily  melatonin, 10 mg, Nightly  multivitamin with minerals, 1 tablet, Daily  nystatin, 5 mL, q6h  pantoprazole, 40 mg, Daily before breakfast  potassium phosphate, 30 mmol, Once  predniSONE, 10 mg, Daily  [Held by provider] rosuvastatin, 40 mg, Nightly  [Held by provider] sirolimus, 3 mg, Daily  [Held by provider] sulfamethoxazole-trimethoprim, 80 mg of trimethoprim, Daily  tacrolimus, 1 mg, q12h TRICIA  traZODone, 50 mg, Nightly  valGANciclovir, 450 mg, q48h      heparin, Last Rate: 800 Units/hr (03/17/24 1138)  lactated Ringer's, Last Rate: 10 mL/hr (03/16/24 0400)  milrinone, Last Rate: 0.25 mcg/kg/min (03/17/24 0833)  sodium bicarbonate infusion Impella Purge 25 mEq/1000 mL D5W, Last Rate: 10 mL/hr (03/17/24 3496)      acetaminophen, 975 mg, q8h PRN  benzocaine-menthol, 1 lozenge, q2h PRN  calcium carbonate, 1,500 mg, q5 min  "PRN  dextrose, 25 g, q15 min PRN  dextrose, 25 g, q15 min PRN   Or  glucagon, 1 mg, q15 min PRN  diphenhydrAMINE, 25 mg, q5 min PRN  glucagon, 1 mg, q15 min PRN  heparin, 2,000-4,000 Units, q4h PRN  HYDROmorphone, 0.2 mg, q3h PRN  artificial tears, 2 drop, PRN  microfibrllar collagen, , PRN  mupirocin, , BID PRN  OLANZapine, 2.5 mg, BID PRN  ondansetron, 4 mg, q4h PRN  oxyCODONE, 5 mg, q4h PRN  polyethylene glycol, 17 g, Daily PRN  sennosides-docusate sodium, 1 tablet, Nightly PRN  sodium chloride, 1 spray, 4x daily PRN  traZODone, 25 mg, Nightly PRN  vancomycin, , Daily PRN        Physical Exam   Blood pressure 85/68, pulse (!) 129, temperature 36.6 °C (97.9 °F), temperature source Temporal, resp. rate 20, height 1.549 m (5' 0.98\"), weight 106 kg (233 lb 11 oz), SpO2 99 %.    General: obese, awake, alert, no acute distress  HEENT: normocephalic, atraumatic  Neck: no palpable lymphadenopathy  CV: normal rate, regular rhythm, no MRG, Impella device insertion site in right axilla noted  Resp: CTAB, no labored breathing  Abdominal: soft, non-tender, non-distended, active bowel sounds  Extremities: full ROM, no significant lower extremity swelling  Neuro: no focal neuro deficits  Skin: no rashes, erythema, or ecchymoses  Psych: normal affect and mood, appropriate judgment    Labs   Reviewed    Imaging   Reviewed    3/17/24 smear reviewed    WBC: no blasts noted  RBC: 3-4 schistocytes per HPF, nucleated RBC  Plt: no platelet clumping    Assessment/Plan     Charla Bowles is a 34 y.o. 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 Endomyocardial biopsy on 2/16 revealed mild grade 1R acute cellular rejection with +CD4s and negative HLAs, however multisystem organ failure persisted with increased inotropic requirements. IABP placed on 2/18. Transferred to [facility]ICU on 2/19 for [facility] ECMO cannulation for persistent multisystem dysfunction. Intubated 2/21 for respiratory failure 2/2 pulmonary edema, extubated 2/24. ECMO de-cannulated 2/29/24 but had worsening HIRAM and overall declined clinical status and IABP was transitioned to R axillary impella 5.5 on 3/1. Completed PLEXx5 (67% albumin return, 33% plasma return) followed by IVIGx5 treatment sessions (2/18, 2/20, 3/7, 3/11, 3/13), completed steroid taper, and thymoglobulin as empiric treatment for rejection. As of 3/16, she is on Impella support and milrinone.      Hematology is consulted for concern of DIC, given LDH since 3/14 has acutely climbed from ~500 to ~2000, Hb dropped from baseline ~7-8 to 6.4 on 3/15 requiring RBC transfusion, and platelet count plummeted from baseline ~150 to the 40s-50s. No adjustments to immunosuppression medications. The 4-extremity Dopplers from 3/12/24 note superficial venous thromboses in bilateral upper extremities likely from IV lines and chronic changes in right internal jugular vein. Primary team had been giving low dose heparin gtt as part of the Impella protocol and stopped it 3/14, sent off anti-PF4 antibodies (neg), recultured patient and added on vancomycin and Zosyn in case of infection (now discontinued as of 3/17). Impella device placement was adjusted morning of 3/15 but LDH has worsened despite this. Tacrolimus level has been adjusted recently, so there could be a  tacrolimus-associated thrombotic microangiopathy. Hematology was consulted for concern of DIC.     There are some schistocytes on the smear but not too concerning in number, as expected for a patient with an Impella. Infectious workup is negative. ZULEIKA is negative. The anti-PF4 antibody is normal from 3/14, which makes ALBERT less likely and is safe to continue heparin gtt at this time. The coagulopathy may be due to the liver, as factor VIII levels are elevated while other factor levels (II, VII, IX, X) are decreased, making DIC less likely. The differential diagnosis is still quite broad, but the hemolysis may mostly be due to the Impella device in place. Will rule out tacro-induced TMA.     Recommendations:  Okay to resume heparin gtt at this time - if the Plt count drops okay to repeat anti-PF4 antibody and switch to argatroban (please make hematology aware if this occurs first).  Please trend daily T/D-bilirubin, LDH, haptoglobin, reticulocyte count, fibrinogen, PT/PTT/INR, D-dimer.  Please send [id number] - the tacrolimus level has been adjusted recently and there may be a tacro-induced TMA.    Thank you for this consult, and hematology will continue to follow.   Patient was seen, examined, and discussed with Dr. Menchaca.    Edmar Maynard MD  Hematology-Oncology Fellow  3/17/2024

## 2024-03-17 NOTE — PROGRESS NOTES
Heart failure ICU attending note  33-year-old female with a medical history of stage D HFrEF status post heart transplantation March 2022 with postop course complicated by symptomatic to our ACR November 2022.  Patient presented in the setting of nausea/vomiting and subtherapeutic sirolimus/tacrolimus levels with low ejection fraction/new LVH and cardiogenic shock.  In terms of immunosuppressive therapies she received pulse steroids, has been maintained on tacrolimus/sirolimus, 2 sessions of IVIG/plasmapheresis on February 18 and 20, 2024; and 2 doses of Thymoglobulin 2/18 and 2/19.  Following an endomyocardial biopsy 2/20/2024 which was negative for rejection (in the setting of negative DSAs) - the Thymoglobulin/IVIG/plasmapheresis sessions were stopped.   IVIG/plasmapheresis resumed March 5, 2024.  Hemodynamically had been supported with ECMO initially, which was then weaned off.  Unfortunately, she required hemodynamic support again, and Impella 5 was placed.  She was eventually transferred to heart failure ICU with Impella 5.  Symptomatically doing well, but continues to have tachycardia.  Symptom wise tolerating wean to Impella P5 reasonably well.  Also tolerating Plex plus IVIG, as well as hemodialysis well.  Cardiac function is certainly no worse, and there appears to be decreased left ventricular hypertrophy on echocardiogram indicating of decreased edema.  Unfortunately, renal function appears to be heading the wrong way.  Plan.  Case was discussed at the transplant meeting.  Given concerns about unknown cause of LV dysfunction, possibly rejection, lack of tolerance of CellCept as well as azathioprine, and kidney dysfunction, she was felt to be not a candidate for cardiac transplantation at this time.  Similarly, ventricular assist device, and face of active immunosuppression was felt to be less than ideal, but was not completely ruled out.  We will request input from renal transplant team as well.  The  etiology of her cardiac dysfunction is by no means clear.  While her presentation is very suspicious for rejection, her biopsies have essentially been negative as have been a DSA's.  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    Plan  Problem 1.  Graft dysfunction.  The etiology is by no means clear.  We have empirically treated this as ejection.  Completed 5 cycles of plasmapheresis with IVIG.    Will hold on further cycles for now.   Problem 2.  Cardiogenic shock.  S[place]worth was placed a few days ago, but was discontinued yesterday given continued discomfort and inability to sit up.  Last recorded numbers were reasonable hemodynamically.  Will continue Impella support, and fluid removal by Tablo/RRT.  Retransplantation is under consideration, but this is extremely challenging. We will rediscuss her case at our advanced heart failure therapeutics meeting on March 19.  Barriers to transplantation include unknown cause of graft dysfunction, renal failure, inability to tolerate mycophenolate, and deconditioning.  We are obtaining opinions from other centers in the country regarding her case.  Problem 3.  Hemolysis/low platelets/INR/low fibrinogen.  The entire picture is unclear, but this may be related to her Impella.  In the past week, the Impella was too deep, this was repositioned.  While her numbers improved, concerns continue about the Impella itself and the duration it has been placed.  Replacing the Impella is by no means to reveal.  We will discuss this with the rest of the team.  In the meantime, we will continue low-dose heparin, and will replete coagulation components as needed.  Hematology input greatly appreciated.  Problem 4.  Renal failure.  This continues to be an issue.  Nephrology input is greatly appreciated.  Problem 5.  Infectious disease.  Currently no signs of active infection.  We will stop antibiotics, which were started empirically when  her white cell count had dipped transiently.  Problem 6.  Immunosuppressive therapy.  Her levels continue to be high.  We will continue level based dosing of tacrolimus and sirolimus.      This critically ill patient continues to be at-risk for clinically significant deterioration / failure due to the above mentioned dysfunctional, unstable organ systems.  I have personally identified and managed all complex critical care issues to prevent aforementioned clinical deterioration.  Critical care time is spent at bedside and/or the immediate area and has included, but is not limited to, the review of diagnostic tests, labs, radiographs, serial assessments of hemodynamics, respiratory status, ventilatory management, and family updates.  Time spent in procedures and teaching are reported separately.    Critical care time: 65 minutes

## 2024-03-17 NOTE — CONSULTS
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           3/17/2024 CT AP without contrast  IMPRESSION:  1. No evidence of aortic hematoma. Unable to assess for addition  acute aortic pathology in the setting of a noncontrast examination.  2. Small bilateral pleural effusions with associated bibasilar  atelectasis. Moderate diffuse body wall anasarca. Diffuse mesenteric  haziness. Mild-to-moderate simple fluid attenuating abdominopelvic  ascites. Mild cardiomegaly. These findings are similar to prior exam.  Correlate with patient's volume status.  3. Other findings as above.  4. Medical devices as above.                                                                           GI Procedures     8/2022 Colonoscopy  Findings:       The perianal and digital rectal examinations were normal.       A diffuse area of mildly erythematous mucosa was found in the sigmoid        colon, in  the ascending colon and in the cecum. Random as well as        targeted biopsies were taken with a cold forceps from the erythematous        areas as well as normal mucosa from right colon (Bottle B), transverse        colon (Bottle C), left colon (Bottle D) and rectum (Bottle E) to rule        out MMF induced colitis, GVHD as well as CMV colitis.       The terminal ileum appeared normal. This was biopsied with a cold        forceps for histology. The pathology specimen was placed into Bottle A.       Non-bleeding external and internal hemorrhoids were found during        retroflexion. The hemorrhoids were small.      Impression:            - Mildly erythematous mucosa in the sigmoid colon, in                          the ascending colon and in the cecum. Biopsied.                         - The examined portion of the terminal ileum was                          normal. Biopsied.                         - Small non-bleeding external and internal hemorrhoids.  Estimated Blood Loss:    FINAL DIAGNOSIS  A.  TERMINAL ILEUM COLD BIOPSY:    --SMALL BOWEL MUCOSA WITH FOCAL EROSION    B.  RIGHT COLON COLD BIOPSY:    --COLONIC MUCOSA WITH INCREASED APOPTOSIS, CRYPT ABSCESS, CRYPT NECROSIS, AND  MUCOSAL EROSION, SEE NOTE  --MELANOSIS COLI    Note: The findings are consistent with medication effect.  Immunostain for CMV  is negative.    C.  TRANSVERSE COLON COLD BIOPSY:    --COLONIC MUCOSA WITH INCREASED APOPTOSIS, EOSINOPHILIC CRYPT ABSCESS, AND  CRYPT NECROSIS, SEE NOTE    D.  LEFT COLON COLD BIOPSY:    --COLONIC MUCOSA WITH INCREASED APOPTOSIS, EOSINOPHILIC CRYPT ABSCESS, AND  CRYPT NECROSIS, SEE NOTE    Note: The findings are consistent with medication effect.    E.  RECTUM COLD BIOPSY:    --COLONIC MUCOSA, NO SIGNIFICANT PATHOLOGIC FINDINGS     4/2018 EGD (prior to bariatric surgery)  Findings:       The exam of the esophagus was normal. Z line at 37 Cm WNL, regular.        hiatus was a bit patulous.       The exam of the  "stomach was otherwise normal. Biopsy done from antum for        H pylori.       The exam of the duodenum to second portion was normal.  Moderate Sedation:       Moderate (conscious) sedation was administered by the endoscopy nurse        and supervised by the endoscopist. The following parameters were        monitored: oxygen saturation, heart rate, blood pressure, respiratory        rate, EKG, adequacy of pulmonary ventilation, and response to care.        Total physician intraservice time was 6 minutes.  Impression:            - No specimens collected.    FINAL DIAGNOSIS  A. GASTRIC ANTRUM, COLD BIOPSY:  --GASTRIC ANTRAL MUCOSA; REACTIVE GASTROPATHY  --NO INTESTINAL METAPLASIA, DYSPLASIA OR MORPHOLOGIC EVIDENCE OF  HELICOBACTER-LIKE MICROORGANISMS.            ASSESSMENT / PLAN                  ASSESSMENT/PLAN:  Charla Bowles is a 34 y.o. female with a past medical history of prior gastric sleeve, end stage heart failure s/p heart transplantation 3/2022 c/b acute rejection in 11/2022. She was admitted on 2/15/2024 with Nausea/Vomiting and was found to have cardiogenic shock. Currently admitted to CICU with impella in place. GI is consulted for new hematemesis.    I was not able to view the emesis sample in-person but was sent a secure photo that appeared to be mostly bilious with faint areas of blood tinged foam. She is relatively hemodynamically stable on her impella and not on pressors at this time. Repeat hgb after her episode of \"hematemesis\" was 7.7, which is largely stable from this morning. She does have biochemical evidence of hemolysis and a few schistocytes seen on her peripheral smear by hematology, suggestive of ongoing hemolysis related to her impella. This appears to be small volume hematemesis which could be related to drew liu tear in the setting of retching. Other differentials include PUD, AVMs, ischemic gastritis, esophagtitis.     The risks of endoscopy likely outweigh the benefits " at this time, so would favor conservative management for now. If she develops overt hematemeiss or melena, would call GI on-call fellow to re-discuss endoscopic intervention    Recommendations:  -Continue to trend CBC per HFICU team  -Agree with IV PPI infusion, can continue this for 72 hours then transition to IV PPI BID   -Agree with holding heparin gtt for now  -Correction of coagulopathy per primary team  -If develops recurrent hematemesis or overt melena, would make NPO and call GI fellow-on call to re-discuss endoscopic intervention    Patient was discussed with Dr. Harris. Patient to be formally seen by GI attending physician Dr. Carmichael in AM    Thank you for this interesting consult. Gastroenterology will continue to follow.  -During weekday hours of 7am-5pm please do not hesitate to contact me on [employer] or page [phone] if there are any further questions between the weekday hours of 7 AM - 5 PM.   -After hours, on weekends, and on holidays, please page the on-call GI fellow at [phone]. Thank you.      Marilynn Aguirre MD
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
## 2024-03-18 PROBLEM — D50.9 IRON DEFICIENCY ANEMIA: Status: ACTIVE | Noted: 2024-01-01

## 2024-03-18 PROBLEM — K76.1: Status: ACTIVE | Noted: 2024-01-01

## 2024-03-18 PROBLEM — E87.1 HYPONATREMIA: Status: ACTIVE | Noted: 2024-01-01

## 2024-03-18 PROBLEM — I50.41 ACUTE COMBINED SYSTOLIC (CONGESTIVE) AND DIASTOLIC (CONGESTIVE) HEART FAILURE (MULTI): Status: ACTIVE | Noted: 2024-01-01

## 2024-03-18 PROBLEM — N17.9 AKI (ACUTE KIDNEY INJURY) (CMS-HCC): Status: ACTIVE | Noted: 2024-01-01

## 2024-03-18 NOTE — PROGRESS NOTES
Gastroenterology Consult Service Progress Note  Department of Gastroenterology & Hepatology  [facility]    [facility]  March 18, 2024   Patient: Charla Bowles    Medical Record: [id number]  Reason for Initial Consult: hematemesis  Requesting Service: HFICU    Interval History: Nauseated last night, tolerated clear liquids. This AM  seen eating omelette, feeling well. Tachycardic. Off pressors. Hb trend: 8.6 (yesterdayAM) --> 7.7 (yesterday afternoon) --> 7.5 this AM.     Physical Exam:    Vitals:    03/18/24 0759 03/18/24 0800 03/18/24 0900 03/18/24 1000   BP:  80/59 80/70 78/61   BP Location:       Patient Position:       Pulse:  (!) 124 (!) 128 (!) 133   Resp:  19 20 18   Temp: 36.2 °C (97.2 °F)      TempSrc: Temporal      SpO2:  100% 99% (!) 85%   Weight:       Height:             Intake/Output Summary (Last 24 hours) at 3/18/2024 1121  Last data filed at 3/18/2024 0800  Gross per 24 hour   Intake 1086 ml   Output 2000 ml   Net -914 ml     General: chronically ill appearing,  no acute distress  HEENT: PERRLA, EOM intact, faint scleral icterus, moist MM, R central line in place  Respiratory: CTA bilaterally, normal work of breathing  Cardiovascular: Mechanical impella sounds,  Abdomen: Soft, tenderness to palpation suprapubic, nondistended, bowel sounds present. No masses palpated. HEIDY not done given wave of abdominal pain   Extremities: no edema, no asterixis  Neuro: alert and oriented, CNII-XII grossly intact, moves all 4 extremities with no focal deficits    Labs:  Lab Results   Component Value Date    WBC 7.4 03/18/2024    HGB 7.5 (L) 03/18/2024    HCT 22.5 (L) 03/18/2024    MCV 94 03/18/2024    PLT 58 (L) 03/18/2024     Lab Results   Component Value Date    GLUCOSE 115 (H) 03/18/2024    CALCIUM 8.6 03/18/2024     (L) 03/18/2024    K 3.9 03/18/2024    CO2 25 03/18/2024    CL 94 (L) 03/18/2024    BUN 13 03/18/2024    CREATININE 1.82 (H) 03/18/2024      Lab Results   Component Value Date    INR 2.1 (H) 03/18/2024    INR 2.2 (H) 03/17/2024    INR 2.2 (H) 03/16/2024    PROTIME 24.3 (H) 03/18/2024    PROTIME 25.0 (H) 03/17/2024    PROTIME 24.5 (H) 03/16/2024     Imaging:    3/17/2024 CT AP without contrast  IMPRESSION:  1. No evidence of aortic hematoma. Unable to assess for addition  acute aortic pathology in the setting of a noncontrast examination.  2. Small bilateral pleural effusions with associated bibasilar  atelectasis. Moderate diffuse body wall anasarca. Diffuse mesenteric  haziness. Mild-to-moderate simple fluid attenuating abdominopelvic  ascites. Mild cardiomegaly. These findings are similar to prior exam.  Correlate with patient's volume status.  3. Other findings as above.  4. Medical devices as above.     GI procedures:    8/2022 Colonoscopy  Findings:       The perianal and digital rectal examinations were normal.       A diffuse area of mildly erythematous mucosa was found in the sigmoid        colon, in the ascending colon and in the cecum. Random as well as        targeted biopsies were taken with a cold forceps from the erythematous        areas as well as normal mucosa from right colon (Bottle B), transverse        colon (Bottle C), left colon (Bottle D) and rectum (Bottle E) to rule        out MMF induced colitis, GVHD as well as CMV colitis.       The terminal ileum appeared normal. This was biopsied with a cold        forceps for histology. The pathology specimen was placed into Bottle A.       Non-bleeding external and internal hemorrhoids were found during        retroflexion. The hemorrhoids were small.        Impression:            - Mildly erythematous mucosa in the sigmoid colon, in                          the ascending colon and in the cecum. Biopsied.                         - The examined portion of the terminal ileum was                          normal. Biopsied.                         - Small non-bleeding external and internal  hemorrhoids.  Estimated Blood Loss:     FINAL DIAGNOSIS  A.  TERMINAL ILEUM COLD BIOPSY:    --SMALL BOWEL MUCOSA WITH FOCAL EROSION    B.  RIGHT COLON COLD BIOPSY:    --COLONIC MUCOSA WITH INCREASED APOPTOSIS, CRYPT ABSCESS, CRYPT NECROSIS, AND  MUCOSAL EROSION, SEE NOTE  --MELANOSIS COLI    Note: The findings are consistent with medication effect.  Immunostain for CMV  is negative.    C.  TRANSVERSE COLON COLD BIOPSY:    --COLONIC MUCOSA WITH INCREASED APOPTOSIS, EOSINOPHILIC CRYPT ABSCESS, AND  CRYPT NECROSIS, SEE NOTE    D.  LEFT COLON COLD BIOPSY:    --COLONIC MUCOSA WITH INCREASED APOPTOSIS, EOSINOPHILIC CRYPT ABSCESS, AND  CRYPT NECROSIS, SEE NOTE    Note: The findings are consistent with medication effect.    E.  RECTUM COLD BIOPSY:    --COLONIC MUCOSA, NO SIGNIFICANT PATHOLOGIC FINDINGS      4/2018 EGD (prior to bariatric surgery)  Findings:       The exam of the esophagus was normal. Z line at 37 Cm WNL, regular.        hiatus was a bit patulous.       The exam of the stomach was otherwise normal. Biopsy done from antum for        H pylori.       The exam of the duodenum to second portion was normal.  Moderate Sedation:       Moderate (conscious) sedation was administered by the endoscopy nurse        and supervised by the endoscopist. The following parameters were        monitored: oxygen saturation, heart rate, blood pressure, respiratory        rate, EKG, adequacy of pulmonary ventilation, and response to care.        Total physician intraservice time was 6 minutes.  Impression:            - No specimens collected.     FINAL DIAGNOSIS  A. GASTRIC ANTRUM, COLD BIOPSY:  --GASTRIC ANTRAL MUCOSA; REACTIVE GASTROPATHY  --NO INTESTINAL METAPLASIA, DYSPLASIA OR MORPHOLOGIC EVIDENCE OF  HELICOBACTER-LIKE MICROORGANISMS.     Assessment and Plan:        Charla Bowles is a 34 y.o. female with a past medical history of prior gastric sleeve, end stage heart failure s/p heart transplantation 3/2022 c/b acute  "rejection in 11/2022. She was admitted on 2/15/2024 with Nausea/Vomiting and was found to have cardiogenic shock. Currently admitted to CICU with impella in place. GI is consulted for new hematemesis.    We were not able to view the emesis sample in-person but was sent a secure photo that appeared to be mostly bilious with faint areas of blood tinged foam. She is relatively hemodynamically stable on her impella and not on pressors at this time. Repeat hgb after her episode of \"hematemesis\" was 7.7 down to 7.5 this AM, which is largely stable. She does have biochemical evidence of hemolysis and a few schistocytes seen on her peripheral smear by hematology, suggestive of ongoing hemolysis related to her impella. This appears to be small volume hematemesis which could be related to drew liu tear in the setting of retching. Other differentials include PUD, AVMs, ischemic gastritis, esophagtitis.      The risks of endoscopy likely outweigh the benefits at this time, so would favor conservative management for now. If she develops overt hematemeiss or melena, would call GI on-call fellow to re-discuss endoscopic intervention     Recommendations:  - okay to resume anticoagulation as needed   - Continue to trend CBC per HFICU team  - Agree with IV PPI infusion, can continue this for 72 hours then transition to IV PPI BID   - Correction of coagulopathy per primary team  - If develops recurrent hematemesis or overt melena, would make NPO and call GI fellow-on call to re-discuss endoscopic intervention    Patient seen and discussed with attending, Dr. Carmichael.     Machelle Correa, GI fellow    Thank you for the consultation.  The consulting team will sign off now.  Please do not hesitate to contact us again on by paging the consultation team again between the weekday hours of 7 AM - 5 PM.  If there is an urgent concern during the weekend, after-hours, or holidays; then please page the on-call GI fellow at [phone]. Thank " you.      SIGNATURE: Machelle Correa MD PATIENT NAME: Charla Bowles   DATE: March 18, 2024 MRN: [id number]

## 2024-03-18 NOTE — CARE PLAN
The patient's goals for the shift include  abdominal pain control, nausea relief    The clinical goals for the shift include patient will tolerated HD and remain HDS throughout shift

## 2024-03-18 NOTE — PROGRESS NOTES
Occupational Therapy    Occupational Therapy Treatment    Name: Charla Bowles  MRN: [id number]  : 1990  Date: 24  Time Calculation  Start Time: 1327  Stop Time: 1350  Time Calculation (min): 23 min    Assessment:  OT Assessment: Pt will benefit from continued OT treatment to improve balance, endurance, and ADL completion  Prognosis: Good  Evaluation/Treatment Tolerance: Patient tolerated treatment well  End of Session Communication: Bedside nurse  Plan:  Treatment Interventions: ADL retraining, Functional transfer training, Patient/family training  OT Frequency: 4 times per week  OT Discharge Recommendations: High intensity level of continued care  Equipment Recommended upon Discharge:  (TBD)  OT Recommended Transfer Status: Assist of 1  OT - OK to Discharge: Yes (pending medical clearance)    Subjective   Previous Visit Info:  OT Last Visit  OT Received On: 24  General:  General  Family/Caregiver Present: No  Co-Treatment: PT  Co-Treatment Reason: To maximize pt safety  Prior to Session Communication: Bedside nurse  Patient Position Received: Up in chair, Alarm off, not on at start of session  General Comment: Pt supine in bed upon arrival; cooperative however easily irritated during session  Precautions:  Medical Precautions: Cardiac precautions  Precautions Comment: R axillary impella precuations  R axillary impella, milrinone     Vitals:  Vital Signs  Heart Rate: (!) 127  Resp: 19  SpO2: 100 %  BP: 86/53  MAP (mmHg): 65  Pain Assessment:  Pain Assessment  Pain Assessment: 0-10  Pain Score: 0 - No pain     Objective   Activities of Daily Living: LE Dressing  LE Dressing: Yes  Sock Level of Assistance: Moderate assistance  LE Dressing Where Assessed: Chair  LE Dressing Comments: Pt stated that she cannot reach down to her R foot due to impella; required Max A for donning R sock and Mod A for donning L sock      Transfers  Transfer: Yes  Transfer 1  Transfer From 1: Sit to  Transfer to 1:  Stand  Technique 1: Sit to stand  Transfer Device 1: Walker  Transfer Level of Assistance 1: Minimum assistance, Moderate tactile cues  Trials/Comments 1: cues for sequencing and walker management      Functional Mobility:  Functional Mobility  Functional Mobility Performed: Yes  Functional Mobility 1  Surface 1: Level tile  Device 1: Rolling walker  Assistance 1: Contact guard  Comments 1: Pt performed FM max household distance in unit with CGA for balance and safety and rest breaks provided as needed  Sitting Balance:     Standing Balance:  Dynamic Standing Balance  Dynamic Standing-Balance Support: Bilateral upper extremity supported  Dynamic Standing-Comments: CGA       Therapy/Activity: Therapeutic Activity  Therapeutic Activity Performed: Yes  Therapeutic Activity 1: sit<>stand with Mod A from chair using 2WW with cues provided for impella precautions  Therapeutic Activity 2: FM in hallway max household distance with rest breaks provided as needed as well as cues for energy conservation techniques                 Outcome Measures:  [facility] Daily Activity  Putting on and taking off regular lower body clothing: A lot  Bathing (including washing, rinsing, drying): A lot  Putting on and taking off regular upper body clothing: A little  Toileting, which includes using toilet, bedpan or urinal: A little  Taking care of personal grooming such as brushing teeth: None  Eating Meals: None  Daily Activity - Total Score: 18        Education Documentation  Handouts, taught by Delmis Rodríguez OT at 3/18/2024  3:43 PM.  Learner: Patient  Readiness: Acceptance  Method: Explanation  Response: Needs Reinforcement    Body Mechanics, taught by Delmis Rodríguez OT at 3/18/2024  3:43 PM.  Learner: Patient  Readiness: Acceptance  Method: Explanation  Response: Needs Reinforcement    Precautions, taught by Delmis Rodríguez OT at 3/18/2024  3:43 PM.  Learner: Patient  Readiness: Acceptance  Method: Explanation  Response: Needs  Reinforcement    Home Exercise Program, taught by Delmis Rodríguez OT at 3/18/2024  3:43 PM.  Learner: Patient  Readiness: Acceptance  Method: Explanation  Response: Needs Reinforcement    ADL Training, taught by Delmis Rodríguez OT at 3/18/2024  3:43 PM.  Learner: Patient  Readiness: Acceptance  Method: Explanation  Response: Needs Reinforcement    Education Comments  No comments found.      Goals:  Encounter Problems       Encounter Problems (Active)       ADLs       Patient will complete daily grooming tasks in standing with LRAD with supervision level of assistance and PRN adaptive equipment. (Progressing)       Start:  03/01/24    Expected End:  03/18/24               ADLs       Patient with complete lower body dressing with stand by assist level of assistance donning and doffing all LE clothes  with PRN adaptive equipment (Progressing)       Start:  03/04/24    Expected End:  03/18/24            Patient will complete toileting including hygiene clothing management/hygiene with stand by assist level of assistance. (Progressing)       Start:  03/04/24    Expected End:  03/18/24               BALANCE       Pt will increase dynamic standing tolerance to >10 min with supervision using LRAD during functional mobility/ADLs without LOB in order to improve activity tolerance and balance for self-care tasks.  (Progressing)       Start:  03/01/24    Expected End:  03/18/24               Balance       Pt will demonstrate improved sitting/standing static/dynamic balance activities without LOB for increase in safety prior to DC.  (Progressing)       Start:  03/01/24    Expected End:  03/15/24               COGNITION/SAFETY       Patient will participate in cognitive activities to demonstrate WFL score on further cognitive assessments, including Medi-Cog, MoCA and remain A&O x3, CAM (-).  (Progressing)       Start:  03/01/24    Expected End:  03/18/24               EXERCISE/STRENGTHENING       Patient will be educated  on BUE HEP for increased ADL/IADL performance. (Progressing)       Start:  03/01/24    Expected End:  03/18/24               MOBILITY       Patient will perform Functional mobility max Household distances/Community Distances with supervision level of assistance and least restrictive device in order to improve safety and functional mobility. (Progressing)       Start:  03/01/24    Expected End:  03/18/24               Mobility       Pt will ambulate 200ft with appropriate form, CGA or less, LRAD, and no LOB for safe DC.  (Progressing)       Start:  03/01/24    Expected End:  03/15/24            Pt will tolerate >15 minutes of upright standing activity without seated rest breaks with no changes in vital signs for improved functional mobility.  (Progressing)       Start:  03/01/24    Expected End:  03/15/24            Pt will ascend/descend 3 steps with HR with CGA or less in order to safely access her home upon DC.  (Progressing)       Start:  03/01/24    Expected End:  03/15/24               Transfers       Pt will perform bed mobility and sit<>stand transfers with CGA or less and use of LRAD to safety DC.  (Progressing)       Start:  03/01/24    Expected End:  03/15/24

## 2024-03-18 NOTE — PROGRESS NOTES
"Charla Bowles is a 34 y.o. female on day 32 of admission presenting with Cardiogenic shock (CMS/HCC).    Subjective   No acute events overnight. Pt endorses nosebleeds while in hospital, provoked by removal of NG tube. Otherwise denies history of bleeding.       Objective     Physical Exam:  General: awake, alert, no acute distress  HEENT: normocephalic, atraumatic  Neck: no palpable lymphadenopathy  CV: RRR, no M/R/G, Impella device insertion site in right axilla noted  Resp: CTAB, no labored breathing on RA  Abdominal: soft, non-tender, non-distended, active bowel sounds  Extremities: full ROM, no significant lower extremity swelling  Neuro: no focal neuro deficits  Skin: no rashes, erythema, or ecchymoses  Psych: normal affect and mood, appropriate judgment    Last Recorded Vitals:  Blood pressure 82/64, pulse (!) 124, temperature 36.1 °C (97 °F), temperature source Temporal, resp. rate (!) 36, height 1.549 m (5' 0.98\"), weight 101 kg (222 lb 0.1 oz), SpO2 100 %.  Intake/Output last 3 Shifts:  I/O last 3 completed shifts:  In: 939.4 (9.4 mL/kg) [P.O.:500; I.V.:239.4 (2.4 mL/kg); Other:200]  Out: 4500 (45.2 mL/kg) [Other:4500]  Dosing Weight: 99.6 kg     Medications:  Scheduled medications  [Held by provider] aspirin, 81 mg, oral, Daily  calcitriol, 0.5 mcg, oral, Daily  calcium gluconate, 2 g, intravenous, Once  darbepoetin floyd, 100 mcg, subcutaneous, q14 days  ferrous sulfate (325 mg ferrous sulfate), 65 mg of iron, oral, Daily with breakfast  hydrALAZINE, 50 mg, oral, q8h  insulin lispro, 0-10 Units, subcutaneous, Before meals & nightly  isosorbide dinitrate, 20 mg, oral, q8h  levothyroxine, 200 mcg, oral, Daily  lidocaine, 1 patch, transdermal, Daily  melatonin, 10 mg, oral, Nightly  multivitamin with minerals, 1 tablet, oral, Daily  nystatin, 5 mL, Swish & Swallow, q6h  predniSONE, 10 mg, oral, Daily  [Held by provider] rosuvastatin, 40 mg, oral, Nightly  [Held by provider] sirolimus, 3 mg, oral, " Daily  [Held by provider] sulfamethoxazole-trimethoprim, 80 mg of trimethoprim, oral, Daily  tacrolimus, 1 mg, oral, q12h TRICIA  traZODone, 50 mg, oral, Nightly  valGANciclovir, 450 mg, oral, q48h    Continuous medications  [Held by provider] heparin, 0-4,000 Units/hr, Last Rate: Stopped (03/17/24 1453)  heparin Impella Purge 25 units/mL in 500 mL D5W, 10 mL/hr, Last Rate: 10 mL/hr (03/18/24 1548)  lactated Ringer's, 10 mL/hr, Last Rate: 10 mL/hr (03/18/24 0700)  milrinone, 0.25 mcg/kg/min (Dosing Weight), Last Rate: 0.25 mcg/kg/min (03/18/24 0000)  pantoprazole (ProtoNix) infusion, 8 mg/hr, Last Rate: 8 mg/hr (03/18/24 0700)    PRN medications  PRN medications: acetaminophen, benzocaine-menthol, calcium carbonate, dextrose, dextrose **OR** glucagon, diphenhydrAMINE, glucagon, [Held by provider] heparin, HYDROmorphone, artificial tears, microfibrllar collagen, mupirocin, OLANZapine, ondansetron, oxyCODONE, polyethylene glycol, sennosides-docusate sodium, sodium chloride, traZODone    Results:  Component   03:15  (3/18/24) 1 d ago  (3/17/24) 1 d ago  (3/17/24) 2 d ago  (3/16/24) 2 d ago  (3/16/24) 3 d ago  (3/15/24)   WBC 7.4 8.9 7.4 7.7 6.1 6.9   nRBC 5.2 High  5.7 High  9.3 High  11.1 High  14.1 High  19.5 High    RBC 2.39 Low  2.48 Low  2.76 Low  2.60 Low  2.31 Low  2.48 Low    Hemoglobin 7.5 Low  7.7 Low  8.6 Low  8.1 Low  7.1 Low  7.6 Low    Hematocrit 22.5 Low  23.5 Low  24.1 Low  23.1 Low  20.9 Low  22.7 Low    MCV 94 95 87 89 91 92   MCH 31.4 31.0 31.2 31.2 30.7 30.6   MCHC 33.3 32.8 35.7 35.1 34.0 33.5   RDW 19.9 High  19.5 High  17.9 High  17.8 High  17.7 High  17.7 High    Platelets 58 Low  99 Low  62 Low  68 Low  47 Low          Retic % 8.4 High    Retic Absolute 0.202 High         LDH 1,543 High      D-Dimer Non VTE, Quant (ng/mL FEU) 4,041 High      Assessment/Plan     Charla Silvagarry Bowles is a 34 y.o. female with a notable history of with a notable history stage D HFrEF (post-partum cardiomyopathy) s/p  ICD s/p CardioMEMs device placement, hypothyroidism 2/2 thyroidectomy on replacement therapy, obesity s/p gastric bypass (2017), and SLE who is s/p orthotopic heart transplantation (3/31/2022) with a post-OHT course complicated by RIJ/RUE DVTs, leukopenia, left groin seroma, worsening renal function, asymptomatic grade 2R acute cellular rejection (11/2022) treated with steroids presented on 2/15/24 for concern for cardiogenic shock secondary to allograft rejection and decompensated heart failure with multiorgan dysfunction including significant elevation of liver enzymes and nonoliguric acute kidney injury. Endomyocardial biopsy on 2/16 revealed mild grade 1R acute cellular rejection with +CD4s and negative HLAs, however multisystem organ failure persisted with increased inotropic requirements. IABP placed on 2/18. Transferred to [facility] on 2/19 for [facility] ECMO cannulation for persistent multisystem dysfunction. Intubated 2/21 for respiratory failure 2/2 pulmonary edema, extubated 2/24. ECMO de-cannulated 2/29/24 but had worsening HIRAM and overall declined clinical status and IABP was transitioned to R axillary impella 5.5 on 3/1. Completed PLEXx5 (67% albumin return, 33% plasma return) followed by IVIGx5 treatment sessions (2/18, 2/20, 3/7, 3/11, 3/13), completed steroid taper, and thymoglobulin as empiric treatment for rejection. As of 3/16, she is on Impella support and milrinone.      Hematology is consulted for concern of DIC, given LDH since 3/14 has acutely climbed from ~500 to ~2000, Hb dropped from baseline ~7-8 to 6.4 on 3/15 requiring RBC transfusion, and platelet count plummeted from baseline ~150 to the 40s-50s. No adjustments to immunosuppression medications. The 4-extremity Dopplers from 3/12/24 note superficial venous thromboses in bilateral upper extremities likely from IV lines and chronic changes in right internal jugular vein. Primary team had been giving low dose heparin gtt as part of the Impella  protocol and stopped it 3/14, sent off anti-PF4 antibodies (neg), recultured patient and added on vancomycin and Zosyn in case of infection (now discontinued as of 3/17). Impella device placement was adjusted morning of 3/15.     There are some schistocytes on the smear but not too concerning in number, as expected for a patient with an Impella. Infectious workup is negative. ZULEIKA is negative. The anti-PF4 antibody is normal from 3/14, which makes ALBERT less likely and it is safe to continue heparin gtt at this time. Possible component of hepatic dysfunction given new transaminitis this admission, although factor activity levels cannot reliably be interpreted given recent history of heparin. Tacrolimus induced TMA is less likely as pt has been on this medication for a while. 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 Hemolysis labs and d-dimer show improvement today. Hgb stable. Plts slightly decreased. Will continue to follow. Please obtain urine hemosiderin for further investigation of intravascular hemolysis. Start folic acid 1mg daily.     Recommendations:  - Please trend daily T/D-bilirubin, LDH, haptoglobin, reticulocyte count, fibrinogen, PT/PTT/INR, D-dimer.  - Follow up [id number]  - Obtain urine hemosiderin  - Start folic acid 1mg daily     Thank you for this consult, and hematology will continue to follow. Patient was seen, examined, and discussed with Dr. Kunz.     Jocy Jane, MS4

## 2024-03-18 NOTE — PROGRESS NOTES
Physical Therapy    Physical Therapy Treatment    Patient Name: Charla Bowles  MRN: [id number]  Today's Date: 3/18/2024  Time Calculation  Start Time: 1327  Stop Time: 1405  Time Calculation (min): 38 min     Assessment/Plan   PT Assessment  End of Session Communication: Bedside nurse  End of Session Patient Position: Up in chair, Alarm off, not on at start of session  PT Plan  Inpatient/Swing Bed or Outpatient: Inpatient  PT Plan  Treatment/Interventions: Bed mobility, Transfer training, Gait training, Stair training, Balance training, Strengthening, Endurance training, Therapeutic exercise, Therapeutic activity  PT Plan: Skilled PT  PT Frequency: 5 times per week  PT Discharge Recommendations: High intensity level of continued care  PT Recommended Transfer Status: Assist x1  PT - OK to Discharge: Yes    General Visit Information:   PT  Visit  PT Received On: 03/18/24  Response to Previous Treatment:  (pt on prolonged bedrest last week 2/2 poor hemodynamics and femoral swan-johnnie catheter)  General  Family/Caregiver Present: No  Co-Treatment: OT  Co-Treatment Reason: To maximize pt safety  Prior to Session Communication: Bedside nurse  Patient Position Received: Up in chair, Alarm off, not on at start of session  General Comment: Pt motivated for therapy. R Axillary Impella (P-5, 3.2 L/min)- dressing with blood in it but no change post mobility.    Subjective     Precautions:  Precautions  Medical Precautions: Cardiac precautions  Precautions Comment: R axillary impella precuations- no pushing/pulling with R UE, No lifting R UE above shoulder height, no R UE humeral EXT past plane of body.    Vital Signs:  Vital Signs  Heart Rate:  (PRE: 127; DURING AMB: 150s; POST: 118)  SpO2:  (PRE: 100%; POST: 98%)  BP:  (PRE: 86/53)    Objective     Pain:  Pain Assessment  Pain Assessment: 0-10  Pain Score: 0 - No pain    Cognition:  Cognition  Overall Cognitive Status: Within Functional Limits  Arousal/Alertness:  Appropriate responses to stimuli  Orientation Level: Oriented X4  Following Commands: Follows all commands and directions without difficulty    Activity Tolerance:  Activity Tolerance  Early Mobility/Exercise Safety Screen: Proceed with mobilization - No exclusion criteria met    Treatments:  Therapeutic Exercise  Therapeutic Exercise Performed: Yes  Therapeutic Exercise Activity 1: Pt performed B LE LAQs 2 x12 reps with 1.5# ankle weight with 1 second pause in EXT.    Ambulation/Gait Training  Ambulation/Gait Training Performed: Yes  Ambulation/Gait Training 1  Surface 1: Level tile  Device 1: No device  Assistance 1: Contact guard  Quality of Gait 1: Narrow base of support, Shuffling gait  Comments/Distance (ft) 1: 350 ft  Transfers  Transfer: Yes  Transfer 1  Transfer From 1: Sit to  Transfer to 1: Stand  Technique 1: Sit to stand  Transfer Level of Assistance 1: Moderate assistance (x1 person; cues for sequencing; increased time to stand; for the second stand- minAx1)  Transfers 2  Transfer From 2: Stand to  Transfer to 2: Sit  Technique 2: Stand to sit  Transfer Level of Assistance 2: Minimum assistance (x1 person)  Trials/Comments 2: x2 trials; decreased eccentric control    Outcome Measures:  [facility] Basic Mobility  Turning from your back to your side while in a flat bed without using bedrails: A little  Moving from lying on your back to sitting on the side of a flat bed without using bedrails: A little  Moving to and from bed to chair (including a wheelchair): A lot  Standing up from a chair using your arms (e.g. wheelchair or bedside chair): A lot  To walk in hospital room: A little  Climbing 3-5 steps with railing: Total  Basic Mobility - Total Score: 14    Confusion Assessment Method-ICU (CAM-ICU)  Feature 1: Acute Onset or Fluctuating Course: Negative  Overall CAM-ICU: Negative    FSS-ICU  Ambulation: Walks >/ or equal to 150 feet with minimal assistance x1  Rolling: Supervision or set-up only  Sitting:  Supervision or set-up only  Transfer Sit-to-Stand: Moderate assistance (performs 50 - 74% of task)  Transfer Supine-to-Sit: Minimal assistance (performs 75% or more of task)  Total Score: 21    ICU Mobility Screen  Early Mobility/Exercise Safety Screen: Proceed with mobilization - No exclusion criteria met  E = Exercise and Early Mobility  Early Mobility/Exercise Safety Screen: Proceed with mobilization - No exclusion criteria met  Current Activity: Ambulating in [place]    Education Documentation  Mobility Training, taught by Melani Mccauley PT at 3/18/2024  5:11 PM.  Learner: Patient  Readiness: Acceptance  Method: Explanation  Response: Needs Reinforcement    Education Comments  No comments found.      Encounter Problems       Encounter Problems (Active)       Balance       Pt will demonstrate improved sitting/standing static/dynamic balance activities without LOB for increase in safety prior to DC.  (Progressing)       Start:  03/01/24    Expected End:  04/01/24               Mobility       Pt will ambulate 200ft with appropriate form, CGA or less, LRAD, and no LOB for safe DC.  (Met)       Start:  03/01/24    Expected End:  03/15/24    Resolved:  03/18/24    Updated to: Pt will ambulate >500 ft with LRAD and supervision with RPD </= 3/10 and RPE </= 13/20.    Update reason: Goal Met         Pt will tolerate >15 minutes of upright standing activity without seated rest breaks with no changes in vital signs for improved functional mobility.  (Progressing)       Start:  03/01/24    Expected End:  04/01/24            Pt will ascend/descend 3 steps with HR with CGA or less in order to safely access her home upon DC.  (Progressing)       Start:  03/01/24    Expected End:  04/01/24            Pt will ambulate >500 ft with LRAD and supervision with RPD </= 3/10 and RPE </= 13/20. (Progressing)       Start:  03/18/24    Expected End:  04/01/24                   Transfers       Pt will perform bed mobility and sit<>stand  transfers with CGA or less and use of LRAD to safety DC.  (Progressing)       Start:  03/01/24    Expected End:  04/01/24                 Signed by Melani Mccauley DPT

## 2024-03-18 NOTE — PROGRESS NOTES
"[facility]  HFICU PROGRESS NOTE    Charla Bowles/[id number]    Admit Date: 2/15/2024  Hospital Length of Stay: 32   ICU Length of Stay: 7d 19h   Primary Service: HFICU  Primary HF Cardiologist: Dr. Cornell  Referring: Dr Cornell     INTERVAL EVENTS / PERTINENT ROS:     No acute events overnight. Placement alarms decreasing overnight. Her LDH is now down-trending @ 1,543; D-Dimer down-trending @ 4,041; Lactate is stable @ 1.2, and her transaminases are also trending down. Electrolytes are stable w/o need for replacement. She has not had any further emesis. CT scan (03/17) without acute ABD.  Her diarrhea has subsided.  Blood cultures (sent: 03/14) w/ NGTD and wound culture (sent: 03/15) w/ NGTD. Her P-level was decreased to P-5 this AM w/ P-level goal of P4 today.        Plan:  - Reduce Impella support to P-5 (goal P4 today)  - Switch Impella purge from sodium bicarb to Heparin purge solution - ordered  - Continue inotrope support Milrinone @ 0.25 mcg/kg/min  - TABLO break today per physical exam and nephrology recs - Euvolemic on exam - appreciate nephrology assistance   - Tacrolimus level (3/18):  3.6 - continue current dose   - Sirolimus level (3/18): PENDING hold AM dose 3/18 until labs are back   - C/w Pantoprazole gtt x 72 hours    - Send amylase and lipase 2/2 CT scan read of: Radiologist read: \"Incompletely characterized haziness of the pancreatic head and  duodenal pancreatic groove, which are limited in assessment given diffuse mesenteric and retroperitoneal fat stranding. However,  recommend correlation with lipase to rule out interstitial pancreatitis or groove pancreatitis. There is no pancreatic ductal dilatation or peripancreatic fluid collections noted.\"     MEDICATIONS  Infusions:  [Held by provider] heparin, Last Rate: Stopped (03/17/24 1453)  heparin Impella Purge 25 units/mL in 500 mL D5W  lactated Ringer's, Last Rate: 10 mL/hr (03/18/24 0700)  milrinone, " Last Rate: 0.25 mcg/kg/min (03/18/24 0000)  pantoprazole (ProtoNix) infusion, Last Rate: 8 mg/hr (03/18/24 0700)      Scheduled:  [Held by provider] aspirin, 81 mg, Daily  calcitriol, 0.5 mcg, Daily  calcium gluconate, 2 g, Once  darbepoetin floyd, 100 mcg, q14 days  ferrous sulfate (325 mg ferrous sulfate), 65 mg of iron, Daily with breakfast  hydrALAZINE, 50 mg, q8h  insulin lispro, 0-10 Units, Before meals & nightly  isosorbide dinitrate, 20 mg, q8h  levothyroxine, 200 mcg, Daily  lidocaine, 1 patch, Daily  melatonin, 10 mg, Nightly  multivitamin with minerals, 1 tablet, Daily  nystatin, 5 mL, q6h  predniSONE, 10 mg, Daily  [Held by provider] rosuvastatin, 40 mg, Nightly  [Held by provider] sirolimus, 3 mg, Daily  [Held by provider] sulfamethoxazole-trimethoprim, 80 mg of trimethoprim, Daily  tacrolimus, 1 mg, q12h TRICIA  traZODone, 50 mg, Nightly  valGANciclovir, 450 mg, q48h      PRN:  acetaminophen, 975 mg, q8h PRN  benzocaine-menthol, 1 lozenge, q2h PRN  calcium carbonate, 1,500 mg, q5 min PRN  dextrose, 25 g, q15 min PRN  dextrose, 25 g, q15 min PRN   Or  glucagon, 1 mg, q15 min PRN  diphenhydrAMINE, 25 mg, q5 min PRN  glucagon, 1 mg, q15 min PRN  [Held by provider] heparin, 2,000-4,000 Units, q4h PRN  HYDROmorphone, 0.2 mg, q2h PRN  artificial tears, 2 drop, PRN  microfibrllar collagen, , PRN  mupirocin, , BID PRN  OLANZapine, 2.5 mg, BID PRN  ondansetron, 4 mg, q4h PRN  oxyCODONE, 5 mg, q4h PRN  polyethylene glycol, 17 g, Daily PRN  sennosides-docusate sodium, 1 tablet, Nightly PRN  sodium chloride, 1 spray, 4x daily PRN  traZODone, 25 mg, Nightly PRN        Impella:      Most Recent Range Past 24hrs   Performance Level 5 P Level  Min: 5   Min taken time: 03/18/24 1100  Max: 6   Max taken time: 03/18/24 0800   Flow (L/min) 3.4 Flow (L/min)  Min: 3.1   Min taken time: 03/18/24 0900  Max: 3.8   Max taken time: 03/17/24 2300   Motor Current 279/223 Motor Current  Min: 262/228   Min taken time: 03/17/24 1700   "Max: 344/282   Max taken time: 03/17/24 2300   Placement Signal Yes  Placement OK could not be evaluated. This SmartLink does not work with rows of the type: Custom List   Purge (mmHg) 525 Purge Pressure (mmHg)  Min: 431   Min taken time: 03/17/24 1900  Max: 566   Max taken time: 03/17/24 1300   Purge rate (mL/hr) 11.5 Purge Rate (mL/hr)  Min: 10   Min taken time: 03/18/24 0000  Max: 12.2   Max taken time: 03/18/24 0800       PHYSICAL EXAM:   Visit Vitals  BP 82/59   Pulse (!) 126   Temp 36.1 °C (97 °F) (Temporal)   Resp 17   Ht 1.549 m (5' 0.98\")   Wt 101 kg (222 lb 0.1 oz)   SpO2 100%   BMI 41.97 kg/m²   OB Status Hysterectomy   Smoking Status Never   BSA 2.08 m²     Wt Readings from Last 5 Encounters:   03/18/24 101 kg (222 lb 0.1 oz)   12/07/23 92.1 kg (203 lb)   12/01/23 93 kg (205 lb)   11/29/23 92.9 kg (204 lb 12.8 oz)   11/09/23 91.3 kg (201 lb 3.2 oz)     INTAKE/OUTPUT:  I/O last 3 completed shifts:  In: 939.4 (9.4 mL/kg) [P.O.:500; I.V.:239.4 (2.4 mL/kg); Other:200]  Out: 4500 (45.2 mL/kg) [Other:4500]  Dosing Weight: 99.6 kg        Physical Exam  Constitutional:       General: She is not in acute distress.     Appearance: Normal appearance. She is not ill-appearing.   HENT:      Head: Normocephalic.      Mouth/Throat:      Mouth: Mucous membranes are moist.   Eyes:      Extraocular Movements: Extraocular movements intact.      Pupils: Pupils are equal, round, and reactive to light.   Neck:      Comments: RIJ dialysis line present   Cardiovascular:      Rate and Rhythm: Regular rhythm. Tachycardia present.      Pulses: Normal pulses.      Heart sounds: Normal heart sounds.   Pulmonary:      Effort: Pulmonary effort is normal. No respiratory distress.      Breath sounds: Normal breath sounds.   Chest:      Comments: Right axillary impella site CDI   Abdominal:      General: Bowel sounds are normal.      Palpations: Abdomen is soft.      Tenderness: There is no abdominal tenderness.   Musculoskeletal:        "  General: Normal range of motion.      Cervical back: Normal range of motion.      Right lower leg: No edema.      Left lower leg: No edema.   Skin:     General: Skin is warm.      Capillary Refill: Capillary refill takes 2 to 3 seconds.      Comments: R femoral SGC + cordis CDI site c/d/I line now removed     Left groin ECMO cannulation site with drainage    Neurological:      General: No focal deficit present.      Mental Status: She is alert and oriented to person, place, and time.   Psychiatric:         Behavior: Behavior normal. Behavior is cooperative.       DATA:  CMP:  Results from last 7 days   Lab Units 03/18/24  0315 03/17/24  1533 03/17/24  0025 03/16/24  1433 03/16/24  0813 03/16/24  0528 03/15/24  1217 03/15/24  0810 03/15/24  0324 03/14/24  0623 03/13/24  0558   SODIUM mmol/L 128* 128* 130* 125* 129* 131* 127* 128* 128* 126* 133*   POTASSIUM mmol/L 3.9 4.2 3.5 3.5 2.7* 2.6* 3.8 3.7 4.0 3.7 3.8   CHLORIDE mmol/L 94* 92* 93* 90* 93* 94* 92* 93* 93* 88* 93*   CO2 mmol/L 25 24 28 25 25 26 25 25 25 26 28   ANION GAP mmol/L 13 16 13 14 14 14 14 14 14 16 16   BUN mg/dL 13 8 2* 9 5* 3* 14 12 7 34* 26*   CREATININE mg/dL 1.82* 1.21* 0.31* 1.27* 0.75 0.51 1.99* 1.73* 1.36* 4.97* 4.37*   EGFR mL/min/1.73m*2 37* 61 >90 57* >90 >90 33* 40* 53* 11* 13*   MAGNESIUM mg/dL 2.49* 1.90 1.87 1.96  --  2.11 3.04* 2.38 1.83 1.92 2.09   ALBUMIN g/dL 3.5 3.4 3.4 3.3* 3.2* 3.3* 3.3* 3.1* 3.3* 3.2* 3.4   ALT U/L 70* 71* 84* 79* 80* 82* 60* 52* 51* 27 33   AST U/L 175* 213* 295* 323* 343* 340* 259* 222* 215* 85* 89*   BILIRUBIN TOTAL mg/dL 2.2* 2.6* 3.6* 4.2* 3.9* 3.4* 2.9* 2.6* 2.4* 1.5* 1.3*   LIPASE U/L  --  93*  --   --   --   --   --   --   --   --   --      CBC:  Results from last 7 days   Lab Units 03/18/24  0315 03/17/24  1444 03/17/24  0025 03/16/24  1433 03/16/24  0528 03/15/24  1217 03/15/24  0810 03/15/24  0324   WBC AUTO x10*3/uL 7.4 8.9 7.4 7.7 6.1 6.9 5.0 4.9   HEMOGLOBIN g/dL 7.5* 7.7* 8.6* 8.1* 7.1* 7.6* 6.4*  6.8*   HEMATOCRIT % 22.5* 23.5* 24.1* 23.1* 20.9* 22.7* 19.5* 19.9*   PLATELETS AUTO x10*3/uL 58* 99* 62* 68* 47* 54* 52* 58*   MCV fL 94 95 87 89 91 92 94 89     COAG:   Results from last 7 days   Lab Units 03/18/24  0315 03/17/24  0025 03/16/24  1433 03/16/24  0528 03/14/24  2214 03/14/24  0623 03/13/24  0558   INR  2.1* 2.2* 2.2* 2.1* 1.8* 1.5* 1.3*     ABO:   ABO TYPE   Date Value Ref Range Status   03/18/2024 O  Final       HEME/ENDO:  Results from last 7 days   Lab Units 03/18/24  0315 03/17/24  1533   TSH mIU/L 20.74* 15.88*        CARDIAC:   Results from last 7 days   Lab Units 03/18/24  0315 03/17/24  0025 03/16/24  1433 03/16/24  0813 03/16/24  0528 03/15/24  1637 03/15/24  1217 03/15/24  0810   LD U/L 1,543* 2,111* 2,170* 2,167* 2,139* 1,816* 1,576* 1,441*       ASSESSMENT AND PLAN:   Charla Bowles is a 32 y/o F with PMHx of heart transplant in March 2022 with postoperative course c/b upper extremity/internal jugular DVTs, and 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 She was admitted to HFICU on 2/15 with concern for cardiogenic shock 2/2 allograft rejection and decompensated heart failure with multiorgan dysfunction including significant elevation of liver enzymes and nonoliguric acute kidney injury. Endomyocardial biopsy on 2/16 revealed mild ACR with +CD4s and negative HLAs, however multisystem organ failure persisted with increased inotropic requirements. IABP placed on 2/18. Transferred to CTICU on 2/19 for [facility] ECMO cannulation with Dr. Marina for persistent multi-organ system dysfunction. Intubated 2/21 for respiratory failure 2/2 pulmonary edema, extubated 2/24. ECMO de-cannulated 2/29/24 but had worsening HIRAM requiring CRRT and overall declined clinical status and IABP was transitioned to R axillary impella 5.5 on 3/1. Transferred from CTICU to HFICU on 3/10 for further management. Continued on milrinone gtt @ 0.25 mcg/kg/min and impella support decreased to P-level 5 on 3/11. Completed PLEX/IVIG  on 3/13. Went for RHC 3/13: RA: 16, RV: 43/1, PA: 43/12, PCWP: 23, PA-SAT: 47%, CO: 5.14, CI: 2.64 on P5 of Impella 5.5 and milrinone 0.25 mcg/kg/min. Impella was increased to P6 shortly after, and overnight 3/14 patient's mixed venous dropped from 58->32, CXR was ordered and SGC was in appropriate position. Repeat mixed venous confirmed low value of 37. Impella increased to P8 and stat ECHO ordered 3/14. WBC continued to down trend 3/14 which prompted new infectious workup to be sent, and patient to be started on broad spectrum abx. Patient transitioned to tablo from iHD for better volume removal given elevated CVPs 3/14. 03/15: Malpositioned Impella adjusted at the bedside under echo by Dr. Marina and Dr. Melo. Impella pulled back ~ 1 - 2 cm and confirmed placement, pushed back in 1 cm in the evening by Dr Lewis for placement alarms. Pelion removed 3/16 and P level decreased to 6 due to continued high hemolysis. 03/17: LDH has plateaued @ 2111. CT scan chest, abd, pelvis complete w/o acute ABD. 03/18: Impella purge solution transitioned from sodium bicarb to heparin solution. Goal to decrease Impella P-level to 4 (03/18).       NEURO:   #Acute Pain   #Insomnia  - Continue tylenol 975 mg Q8 PRN for mild pain   - Continue oxycodone 5 mg Q4 PRN for moderate pain   - Continue Diluadid 0.2 mg q3 PRN for severe pain   - Lidocaine patch PRN   - Continue melatonin 10 mg nightly   - Continue trazodone 50 mg nightly for insomnia  - PT/OT   - CAM ICU score qshift  - Sleep/wake cycle hygiene  - Serial neuro and pain assessments     ENT:  #Epistaxis (resolved)  - Significant epistaxis 2/28 and 3/3 requiring ENT consult, packing removed by ENT 3/5  - S/p ocean spray 5x day x10 days completed   - Ocean spray and mupiricin PRN for dry nasal passages     CARDIAC:  #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)  Toxo: -/-   Hep C: -/-      Rejection/Prophylaxis (transplant):  - Steroids: 10 mg PO prednisone daily  - Tacrolimus: 1 mg PO @ 0630 & 1 mg PO @ 1830 w/ daily levels drawn @ 0600  - Sirolimus: Holding AM dose 3/18 until labs are back w/ daily levels drawn at 0600  - Tacrolimus goal troughs: 3-5, daily level 3/18: 3.6   - Sirolimus goal troughs: 7-9, daily level 3/18: 6.6- currently holding  - Combined sirolimus/tacrolimus goal of 10-12  - Antifungals: nystatin oral suspension 5 mls q6  - Antivirals: Valcyte 450 mg q48 due to low grade viremia   - Anti PCP & Toxoplasmosis: Bactrim SS daily --> holding due to thrombocytopenia (2/23)     Last cardiac biopsy: 2/16/24 with ACR1 and no AMR  Last HLA (2/16/24): negative for DSAs   Last RHC (3/13/24): RA: 16, RV: 43/1, PA: 43/12, PCWP: 23, PA-SAT: 47%, CO: 5.14, CI: 2.64 on P5 of Impella 5.5 and milrinone 0.25 mcg/kg/min   Last LHC (2/18/24): negative for CAV and CAD   Last TTE/BOB (3/1/24): shows LVEF to 30% and mild RV dysfunction (intra-op impella 5.5 insert)  Osteopenia/osteoporosis prophylaxis: Vitamin D3 and calcium supplements  Peptic/gastric ulcer prophylaxis: Pantoprazole 40 mg daily   CAV Prophylaxis: Aspirin 81 mg daily & HOLDING rosuvastatin 40 mg nightly 2/2 transaminitis     - Unclear cause of acute severe graft dysfunction. Most likely smoldering ACR vs. AMR; however, 2x allograft biopsies on 2/16 & 2/20 remain negative for any significant pathology. Notably, both biopsies taken after rejection therapies implemented which may have reduced areas of graft damage.    - DSAs remain negative; however, patient may have non-HLA antibodies present. 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   - Negative CAV & CAD via LHC on 2/18/24   - Completed methylpred steroid pulse w/ 1g q24 x 3 days (2/16-2/18) and prednisone taper   - Thymoglobulin doses: 2/18 & 2/19   - IVIG + PLEX Sessions completed: 2/18, 2/20, 3/7, 3/11, 3/13 - completed   - Graft function slightly worse after transition to  impella 5.5 on 3/1. 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  #Impella 5.5 support (3/1/24)  RHC (3/13/24): RA: 16, RV: 43/1, PA: 43/12, PCWP: 23, PA-SAT: 47%, CO: 5.14, CI: 2.64 on P5 of Impella 5.5, milrinone 0.25 mcg/kg/min, hydralazine 100 mg q8, isordil 40 mg q8  Opening SGC #s (3/13): BP 94/71 (79), PAP 42/30 (35), CVP 13, , CO/CI (kyra) 5.46/2.80, SVO2 56% on P5 of Impella 5.5, milrinone 0.25 mcg/kg/min, hydralazine 100 mg q8, isordil 40 mg q8  Daily/ Closing SGC #s (3/16): /86 (97), CVP 20, PAP 35/25 (42), SVR 1115, CO/CI (kyra) 5.5/2.8, SVO2 51% on P7 of Impella 5.5, milrinone 0.25 mcg/kg/min, hydralazine 50 mg q8, isordil 20 mg q8  - Maintain goal MAP 70-90  - Impella P level maintaining at P5, wean per clinical picture / hemodynamics - goal P4 today (03/18)  - Transition to Heparin purge for Impella per Dr. Wright (03/18)  - Continue milrinone 0.25 mcg/kg/min  - Continue afterload reduction with PO hydralazine 50 mg q8 + PO isosorbide 20 mg q8 Currently holding d/t low BP   - Continue ASA - Currently HELD 2/2 low platelet count  - Continue to hold rosuvastatin 40 mg daily until transaminases normalize   - S/p Digoxin 125 mcg PO (3/12, 3/13), digoxin level (3/14): 0.71 - currently discontinued - will readdress for RV protection / HR  - TABLO break today (03/18)  - STAT ECHO completed 03/14: LV systolic function is severely decreased with a 25-30% estimated ejection fraction; There is mildly reduced right ventricular systolic function; Moderate mitral valve regurgitation; Moderate to severe tricuspid regurgitation visualized; There is global hypokinesis of the left ventricle with minor regional variations.    #Advanced therapy evaluation  - Presented to committee 3/12/2024 - concern for end organ failure (possible heart / kidney)   - Not a candidate for  transplant at this time per committee discussion 3/12/24  - Plan to discuss in selection committee meeting 03/19     PULM:   #Acute Hypoxic Respiratory Failure 2/2 pulmonary edema (resolved)  ETT (2/21-2/24)  Remains on RA   - Maintain SpO2 > 92%     GI:   #Nausea  #Constipation   #Diarrhea   #Emesis (dark blood - clots)   #Right sided flank pain:   - Repeat CT chest/abd/pelvis w/o contrast (3/17): no acute abdomen    - C/w Zofran PRN   - C/w Olanzapine 2.5 mg IM TID PRN   - Holding Christopher-Colace nightly standing dose / Miralax PRN   - Patient had emesis x1 with dark blood in vomit -> GI consult placed 3/17 appreciate recs  - Protonix gtt initiated 3/17 x 72 hours - stop 03/20 @ 1600  - Sent stool for C-Diff -> Negative (03/17)    #Hx of gastric bypass   #Hx of MMF colitis  #Acute transaminitis   - Continue home PPI, calcitriol 0.5 mg daily, multivitamin  - HOLDING miralax prn & christopher-colace daily   - Trend LFTs daily - improving 03/18     :   #Acute Renal Failure 2/2 to cardiorenal syndrome (on IHD)  Baseline Cr 1.2-1.3  CVVH stopped 2/27/24  - RFP daily and PRN  - Tablo per daily assessment - HOLING TABLO evening of 03/18   - Transplant nephrology following closely, appreciate assistance     ENDO:   #T2DM  Steroid induced hyperglycemia acceptable glycemic control on SSI  - Maintain BG <180 with hypoglycemia protocol  - Continue SSI     #Hypothyroidism  TSH (3/17): 20.74, T4 1.11, T3: 1.4    - Continue synthroid 200 mcg daily   - Endocrine following, appreciate assistance      HEME:   #Acute DVT LIJ and SVT left cephalic vein and right cephalic vein   #Iron deficiency anemia  #Acute blood loss anemia   #Multiple transfusions   #Hemolysis   UE and LE Venous duplex (3/6/24): right acute occlusive superficial venous thrombosis visualized in the mid cephalic and acute occlusive superficial venous thrombosis visualized in the distal cephalic veins, left acute non-occlusive deep vein thrombosis visualized in the internal  jugular and acute non-occlusive superficial venous thrombosis visualized in the mid cephalic veins   UE and LE Venous duplex (3/12): unchanged from prior  Last type and screen: 3/15  - 03/15: Transfused 1 unit leukocyte reduced PRBC's   - Holding Heparin d/t vomiting blood 3/17  - ASA on hold d/t low platelet count   - Continue SCDs for DVT prophylaxis  - Continue Aranesp every 2 weeks  - Vascular medicine signed off 3/13, will need discussion regarding long term anticoagulation closer to discharge   - Consulted hematology regarding DIC / ALBERT: unlikely per hematology (03/17)  - Per hematology, trend daily T/D-bilirubin, LDH, haptoglobin, reticulocyte count, fibrinogen, PT/PTT/INR, D-dimer.  - [id number] sent per heme recs: PENDING - the tacrolimus level has been adjusted recently and there may be a tacro-induced TMA.     ID:   #Leukopenia, likely in the setting of IVIG vs infectious process (resolved)  Blood cultures (2/15): NGTD  Afebrile, nontoxic  - Send infectious workup: blood cultures + respiratory culture - PENDING - NGTD 03/18  - Stopped Broad spectrum abx 03/17: vancomycin + zosyn (3/14-3/17)  - Follow lactate: 03/18: 1.2  - Left groin ECMO cannulation site - wound draining - wound care following appreciate recs - cultures sent - NGTD  - Trend temp q4h     PHYSICAL AND OCCUPATIONAL THERAPY: ordered and following     LINES:  PIVs  RIJ trialysis catheter 3/5  R Axillary Impella 3/1    DVT: SCDs  VAP BUNDLE: N/A  ULCER PPX: PPI - pantoprazole gtt   GLYCEMIC CONTROL: SSI  BOWEL CARE: Patti-colace, miralax    INDWELLING CATHETER: NA  NUTRITION: Adult diet Regular; 1500 mL fluid      EMERGENCY CONTACT: Extended Emergency Contact Information  Primary Emergency Contact: SAHIL DIMAS  Address: [address] [place] [address] of [place]  Home Phone: [phone]  Mobile Phone: [phone]  Relation: Mother  FAMILY UPDATE: given at bedside  CODE STATUS: Full Code  DISPO: remain in  ROLAND    Patient seen and assessed with Dr. Wright   _________________________________________________  DIANA Vargas

## 2024-03-18 NOTE — CONSULTS
Vancomycin Dosing by Pharmacy- Cessation of Therapy    Consult to pharmacy for vancomycin dosing has been discontinued by the prescriber, pharmacy will sign off at this time.    Please call pharmacy if there are further questions or re-enter a consult if vancomycin is resumed.     Fani Mcguire, PharmD

## 2024-03-18 NOTE — PROGRESS NOTES
HFICU Attending Note    Principal Problem:    Cardiogenic shock (CMS/HCC)  Active Problems:    Heart transplant recipient (CMS/HCC)    ESRD (end stage renal disease) on dialysis (CMS/HCC)    HIRAM (acute kidney injury) (CMS/HCC)    Acute passive congestion of liver    Hyponatremia    Iron deficiency anemia    Encounter for aftercare following heart transplant (CMS/HCC)    Heart transplant as cause of abnormal reaction or later complication (CMS/HCC)    Cardiac transplant rejection (CMS/HCC)    Abnormal findings on diagnostic imaging of heart and coronary circulation    Acute combined systolic (congestive) and diastolic (congestive) heart failure (CMS/HCC)    Cardiogenic shock due to acute systolic heart failure from transplanted heart rejection. Subsequent multi organ failure including HIRAM and liver congestion. She is supported by axilllary imeplla. On exam today her extremities are warm. P6 currently, and our plan is to carefully reduce speed as tolerated. Are we going to be able to eventually wean her off impella?     Discussed at bedside with team, patient, and family member.    This critically ill patient continues to be at-risk for clinically significant deterioration / failure due to the above mentioned dysfunctional, unstable organ systems.  I have personally identified and managed all complex critical care issues to prevent aforementioned clinical deterioration.  Critical care time is spent at bedside and/or the immediate area and has included, but is not limited to, the review of diagnostic tests, labs, radiographs, serial assessments of hemodynamics, respiratory status, ventilatory management, and family updates.  Time spent in procedures and teaching are reported separately.    Critical care time: __50__ minutes
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
## 2024-03-19 NOTE — PROGRESS NOTES
"[facility]  HFICU PROGRESS NOTE    Charla Bowles/[id number]    Admit Date: 2/15/2024  Hospital Length of Stay: 33   ICU Length of Stay: 8d 20h   Primary Service: HFICU  Primary HF Cardiologist: Dr. Cornell  Referring: Dr Cornell     INTERVAL EVENTS / PERTINENT ROS:     No acute events overnight. Placement signal alarms on P-level 4 remain stable. Her LDH (1,105) and D-Dimer (3,178) continue to down-trend. Her transaminases / bilirubin levels continue to decline as well. Her lactate is 1.1, she feel warm and appears well perfused; however, her volume status is rising and she endorses feeling \"heavy\" upon ambulation today. Her hyponatremia is worsening indicating need for volume removal. Electrolytes stable w/o need for replacement.  Her ABD pain continues to improve and she is ambulating around unit daily.     Plan:  - SLED therapy today for fluid removal - discussed with nephrology awaiting orders - dialysis every day moving forward  - Initiate Myfortic low dose BID  - Discontinue Sirolimus   - Increase Tacrolimus to 2 mg BID (goal: 8 - 10)  - Stop Milrinone and start Epinephrine 3 hours later   - C/w Pantoprazole gtt (Stop Wednesday 03/20 @ 1900)         MEDICATIONS  Infusions:  EPINEPHrine  heparin Impella Purge 25 units/mL in 500 mL D5W, Last Rate: 10 mL/hr (03/19/24 1000)  milrinone, Last Rate: Stopped (03/19/24 0900)  pantoprazole (ProtoNix) infusion, Last Rate: 8 mg/hr (03/19/24 1000)      Scheduled:  [Held by provider] aspirin, 81 mg, Daily  calcitriol, 0.5 mcg, Daily  darbepoetin floyd, 100 mcg, q14 days  ferrous sulfate (325 mg ferrous sulfate), 65 mg of iron, Daily with breakfast  folic acid, 1 mg, Daily  insulin lispro, 0-10 Units, Before meals & nightly  levothyroxine, 200 mcg, Daily  lidocaine, 1 patch, Daily  melatonin, 10 mg, Nightly  multivitamin with minerals, 1 tablet, Daily  mycophenolate, 180 mg, BID  nystatin, 5 mL, q6h  predniSONE, 10 mg, Daily  [Held by " "provider] rosuvastatin, 40 mg, Nightly  [Held by provider] sirolimus, 3 mg, Daily  [Held by provider] sulfamethoxazole-trimethoprim, 80 mg of trimethoprim, Daily  tacrolimus, 1 mg, Once  tacrolimus, 2 mg, q12h TRICIA  traZODone, 50 mg, Nightly  valGANciclovir, 450 mg, q48h      PRN:  acetaminophen, 975 mg, q8h PRN  benzocaine-menthol, 1 lozenge, q2h PRN  dextrose, 25 g, q15 min PRN  dextrose, 25 g, q15 min PRN   Or  glucagon, 1 mg, q15 min PRN  diphenhydrAMINE, 25 mg, q5 min PRN  glucagon, 1 mg, q15 min PRN  HYDROmorphone, 0.2 mg, q2h PRN  artificial tears, 2 drop, PRN  microfibrllar collagen, , PRN  mupirocin, , BID PRN  OLANZapine, 2.5 mg, BID PRN  ondansetron, 4 mg, q4h PRN  oxyCODONE, 5 mg, q4h PRN  polyethylene glycol, 17 g, Daily PRN  sennosides-docusate sodium, 1 tablet, Nightly PRN  sodium chloride, 1 spray, 4x daily PRN  traZODone, 25 mg, Nightly PRN        Impella:      Most Recent Range Past 24hrs   Performance Level 4 P Level  Min: 4   Min taken time: 03/19/24 1100  Max: 5   Max taken time: 03/18/24 1300   Flow (L/min) 2 Flow (L/min)  Min: 2   Min taken time: 03/19/24 1100  Max: 3.2   Max taken time: 03/18/24 1300   Motor Current 221/157 Motor Current  Min: 215/201   Min taken time: 03/18/24 1400  Max: 275/213   Max taken time: 03/18/24 1300   Placement Signal Yes  Placement OK could not be evaluated. This SmartLink does not work with rows of the type: Custom List   Purge (mmHg) 417 Purge Pressure (mmHg)  Min: 417   Min taken time: 03/19/24 1100  Max: 599   Max taken time: 03/19/24 0500   Purge rate (mL/hr) 11.4 Purge Rate (mL/hr)  Min: 11   Min taken time: 03/18/24 1400  Max: 12.4   Max taken time: 03/18/24 2000       PHYSICAL EXAM:   Visit Vitals  BP 81/56   Pulse (!) 124   Temp 36.8 °C (98.2 °F) (Temporal)   Resp 20   Ht 1.549 m (5' 0.98\")   Wt 100 kg (220 lb 7.4 oz)   SpO2 97%   BMI 41.68 kg/m²   OB Status Hysterectomy   Smoking Status Never   BSA 2.07 m²     Wt Readings from Last 5 Encounters: "   03/19/24 100 kg (220 lb 7.4 oz)   12/07/23 92.1 kg (203 lb)   12/01/23 93 kg (205 lb)   11/29/23 92.9 kg (204 lb 12.8 oz)   11/09/23 91.3 kg (201 lb 3.2 oz)     INTAKE/OUTPUT:  I/O last 3 completed shifts:  In: 2314.6 (23.2 mL/kg) [P.O.:740; I.V.:1374.6 (13.8 mL/kg); Other:200]  Out: 2000 (20.1 mL/kg) [Other:2000]  Dosing Weight: 99.6 kg        Physical Exam  Constitutional:       General: She is not in acute distress.     Appearance: Normal appearance. She is not ill-appearing.   HENT:      Head: Normocephalic.      Mouth/Throat:      Mouth: Mucous membranes are moist.   Eyes:      Extraocular Movements: Extraocular movements intact.      Pupils: Pupils are equal, round, and reactive to light.   Neck:      Comments: RIJ dialysis line present   Cardiovascular:      Rate and Rhythm: Regular rhythm. Tachycardia present.      Pulses: Normal pulses.      Heart sounds: Normal heart sounds.   Pulmonary:      Effort: Pulmonary effort is normal. No respiratory distress.      Breath sounds: Normal breath sounds.   Chest:      Comments: Right axillary impella site CDI   Abdominal:      General: Bowel sounds are normal.      Palpations: Abdomen is soft.      Tenderness: There is no abdominal tenderness.   Musculoskeletal:         General: Normal range of motion.      Cervical back: Normal range of motion.      Right lower leg: No edema.      Left lower leg: No edema.   Skin:     General: Skin is warm.      Capillary Refill: Capillary refill takes 2 to 3 seconds.      Comments: R femoral SGC + cordis CDI site c/d/I line now removed     Left groin ECMO cannulation site with drainage    Neurological:      General: No focal deficit present.      Mental Status: She is alert and oriented to person, place, and time.   Psychiatric:         Behavior: Behavior normal. Behavior is cooperative.       DATA:  CMP:  Results from last 7 days   Lab Units 03/19/24  0605 03/18/24  1244 03/18/24  0315 03/17/24  1533 03/17/24  0025  03/16/24  1433 03/16/24  0813 03/16/24  0528 03/15/24  1217 03/15/24  0810 03/15/24  0324 03/14/24  0623   SODIUM mmol/L 126*  --  128* 128* 130* 125* 129* 131* 127* 128* 128* 126*   POTASSIUM mmol/L 4.2  --  3.9 4.2 3.5 3.5 2.7* 2.6* 3.8 3.7 4.0 3.7   CHLORIDE mmol/L 92*  --  94* 92* 93* 90* 93* 94* 92* 93* 93* 88*   CO2 mmol/L 23  --  25 24 28 25 25 26 25 25 25 26   ANION GAP mmol/L 15  --  13 16 13 14 14 14 14 14 14 16   BUN mg/dL 26*  --  13 8 2* 9 5* 3* 14 12 7 34*   CREATININE mg/dL 3.21*  --  1.82* 1.21* 0.31* 1.27* 0.75 0.51 1.99* 1.73* 1.36* 4.97*   EGFR mL/min/1.73m*2 19*  --  37* 61 >90 57* >90 >90 33* 40* 53* 11*   MAGNESIUM mg/dL 2.53*  --  2.49* 1.90 1.87 1.96  --  2.11 3.04* 2.38 1.83 1.92   ALBUMIN g/dL 3.3*  --  3.5 3.4 3.4 3.3* 3.2* 3.3* 3.3* 3.1* 3.3* 3.2*   ALT U/L 53*  --  70* 71* 84* 79* 80* 82* 60* 52* 51* 27   AST U/L 103*  --  175* 213* 295* 323* 343* 340* 259* 222* 215* 85*   BILIRUBIN TOTAL mg/dL 1.6*  --  2.2* 2.6* 3.6* 4.2* 3.9* 3.4* 2.9* 2.6* 2.4* 1.5*   LIPASE U/L  --  46  --  93*  --   --   --   --   --   --   --   --        CBC:  Results from last 7 days   Lab Units 03/19/24  0605 03/18/24  0315 03/17/24  1444 03/17/24  0025 03/16/24  1433 03/16/24  0528 03/15/24  1217 03/15/24  0810   WBC AUTO x10*3/uL 6.3 7.4 8.9 7.4 7.7 6.1 6.9 5.0   HEMOGLOBIN g/dL 7.5* 7.5* 7.7* 8.6* 8.1* 7.1* 7.6* 6.4*   HEMATOCRIT % 22.4* 22.5* 23.5* 24.1* 23.1* 20.9* 22.7* 19.5*   PLATELETS AUTO x10*3/uL 68* 58* 99* 62* 68* 47* 54* 52*   MCV fL 90 94 95 87 89 91 92 94       COAG:   Results from last 7 days   Lab Units 03/19/24  0605 03/18/24  0315 03/17/24  0025 03/16/24  1433 03/16/24  0528 03/14/24  2214 03/14/24  0623 03/13/24  0558   INR  1.9* 2.1* 2.2* 2.2* 2.1* 1.8* 1.5* 1.3*       ABO:   ABO TYPE   Date Value Ref Range Status   03/18/2024 O  Final       HEME/ENDO:  Results from last 7 days   Lab Units 03/18/24  0315 03/17/24  1533   TSH mIU/L 20.74* 15.88*          CARDIAC:   Results from last 7 days    Lab Units 03/19/24  0605 03/18/24  0315 03/17/24  0025 03/16/24  1433 03/16/24  0813 03/16/24  0528 03/15/24  1637 03/15/24  1217   LD U/L 1,105* 1,543* 2,111* 2,170* 2,167* 2,139* 1,816* 1,576*         ASSESSMENT AND PLAN:   Charla Bowles is a 32 y/o F with PMHx of heart transplant in March 2022 with postoperative course c/b upper extremity/internal jugular DVTs, and 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 She was admitted to HFICU on 2/15 with concern for cardiogenic shock 2/2 allograft rejection and decompensated heart failure with multiorgan dysfunction including significant elevation of liver enzymes and nonoliguric acute kidney injury. Endomyocardial biopsy on 2/16 revealed mild ACR with +CD4s and negative HLAs, however multisystem organ failure persisted with increased inotropic requirements. IABP placed on 2/18. Transferred to CTICU on 2/19 for [facility] ECMO cannulation with Dr. Marina for persistent multi-organ system dysfunction. Intubated 2/21 for respiratory failure 2/2 pulmonary edema, extubated 2/24. ECMO de-cannulated 2/29/24 but had worsening HIRAM requiring CRRT and overall declined clinical status and IABP was transitioned to R axillary impella 5.5 on 3/1. Transferred from CTICU to HFICU on 3/10 for further management. Continued on milrinone gtt @ 0.25 mcg/kg/min and impella support decreased to P-level 5 on 3/11. Completed PLEX/IVIG on 3/13. Went for RHC 3/13: RA: 16, RV: 43/1, PA: 43/12, PCWP: 23, PA-SAT: 47%, CO: 5.14, CI: 2.64 on P5 of Impella 5.5 and milrinone 0.25 mcg/kg/min. Impella was increased to P6 shortly after, and overnight 3/14 patient's mixed venous dropped from 58->32, CXR was ordered and SGC was in appropriate position. Repeat mixed venous confirmed low value of 37. Impella increased to P8 and stat ECHO ordered 3/14. WBC continued to down trend 3/14 which prompted new infectious workup to be sent, and patient to be started on broad spectrum abx. Patient transitioned to tablo from iHD  for better volume removal given elevated CVPs 3/14. 03/15: Malpositioned Impella adjusted at the bedside under echo by Dr. Marina and Dr. Melo. Impella pulled back ~ 1 - 2 cm and confirmed placement, pushed back in 1 cm in the evening by Dr Lewis for placement alarms. Fairplay removed 3/16 and P level decreased to 6 due to continued high hemolysis. 03/17: LDH has plateaued @ 2111. CT scan chest, abd, pelvis complete w/o acute ABD. 03/18: Impella purge solution transitioned from sodium bicarb to heparin solution. 03/19: Milrinone gtt discontinued and transitioned to Epinephrine gtt to improve blood pressures. Initiated Myfortic low dose BID, Discontinued Sirolimus, Increased Tacrolimus to 2 mg BID (goal: 8 - 10).       NEURO:   #Acute Pain   #Insomnia  - Continue tylenol 975 mg Q8 PRN for mild pain   - Continue oxycodone 5 mg Q4 PRN for moderate pain   - Continue Diluadid 0.2 mg q3 PRN for severe pain   - Lidocaine patch PRN   - Continue melatonin 10 mg nightly   - Continue trazodone 50 mg nightly for insomnia  - PT/OT   - CAM ICU score q shift  - Sleep/wake cycle hygiene  - Serial neuro and pain assessments     ENT:  #Epistaxis (resolved)  - Significant epistaxis 2/28 and 3/3 requiring ENT consult, packing removed by ENT 3/5  - S/p ocean spray 5x day x10 days completed   - Ocean spray and mupiricin PRN for dry nasal passages     CARDIAC:  #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)  Toxo: -/-   Hep C: -/-     Rejection/Prophylaxis (transplant):  - C/w Steroids: 10 mg PO prednisone daily  - Increase Tacrolimus: 2 mg PO @ 0630 & 2 mg PO @ 1830 w/ daily levels drawn @ 0600  - Sirolimus: level: 03/19: 5.6 - now Discontinued   - Initiate low dose Myfortic 180 mg BID   - Tacrolimus goal troughs: 8 - 10 (Goal Changed 03/19) , daily level 3/19: 3.1  - Sirolimus goal troughs: Discontinued (previous goal 7-9)  - Antifungals: nystatin oral suspension 5  mls q6  - Antivirals: Valcyte 450 mg q48 due to low grade viremia   - HOLDING Anti PCP & Toxoplasmosis: Bactrim SS daily --> holding due to thrombocytopenia (2/23)     Last cardiac biopsy: 2/16/24 with ACR1 and no AMR  Last HLA (2/16/24): negative for DSAs   Last RHC (3/13/24): RA: 16, RV: 43/1, PA: 43/12, PCWP: 23, PA-SAT: 47%, CO: 5.14, CI: 2.64 on P5 of Impella 5.5 and milrinone 0.25 mcg/kg/min   Last LHC (2/18/24): negative for CAV and CAD   Last TTE/BOB (3/1/24): shows LVEF to 30% and mild RV dysfunction (intra-op impella 5.5 insert)  Osteopenia/osteoporosis prophylaxis: Vitamin D3 and calcium supplements  Peptic/gastric ulcer prophylaxis: Pantoprazole 40 mg daily   CAV Prophylaxis: HOLDING Aspirin 81 mg daily 2/2 thrombocytopenia & HOLDING rosuvastatin 40 mg nightly 2/2 transaminitis     - Unclear cause of acute severe graft dysfunction. Most likely smoldering ACR vs. AMR; however, 2x allograft biopsies on 2/16 & 2/20 remain negative for any significant pathology. Notably, both biopsies taken after rejection therapies implemented which may have reduced areas of graft damage.    - DSAs remain negative; however, patient may have non-HLA antibodies present. 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   - Negative CAV & CAD via LHC on 2/18/24   - Completed methylpred steroid pulse w/ 1g q24 x 3 days (2/16-2/18) and prednisone 10 mg daily   - Thymoglobulin doses: 2/18 & 2/19   - IVIG + PLEX Sessions completed: 2/18, 2/20, 3/7, 3/11, 3/13 - completed   - Graft function slightly worse after transition to impella 5.5 on 3/1. 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  #Impella 5.5 support (3/1/24)  RHC (3/13/24): RA: 16, RV: 43/1, PA: 43/12, PCWP: 23, PA-SAT: 47%, CO: 5.14, CI: 2.64 on P5 of Impella 5.5, milrinone 0.25 mcg/kg/min, hydralazine 100 mg q8, isordil 40 mg  q8  Opening SGC #s (3/13): BP 94/71 (79), PAP 42/30 (35), CVP 13, , CO/CI (kyra) 5.46/2.80, SVO2 56% on P5 of Impella 5.5, milrinone 0.25 mcg/kg/min, hydralazine 100 mg q8, isordil 40 mg q8  Daily/ Closing SGC #s (3/16): /86 (97), CVP 20, PAP 35/25 (42), SVR 1115, CO/CI (kyra) 5.5/2.8, SVO2 51% on P7 of Impella 5.5, milrinone 0.25 mcg/kg/min, hydralazine 50 mg q8, isordil 20 mg q8  - Maintain goal MAP 70-90  - Impella P level maintaining at P4, wean per clinical picture / hemodynamics - goal maintain P4 today (03/19)  - Transitioned to Heparin purge for Impella per Dr. Wright (03/19)  - Discontinue milrinone gttp 0.25 mcg/kg/min (03/19)  - Initiate Epinephrine gtt for improved blood pressure to increase fluid removal (03/19)  - Discontinued afterload reduction with PO hydralazine 50 mg q8 + PO isosorbide 20 mg q8 2/2 hypotension    - Continue ASA - Currently HELD 2/2 low platelet count  - Continue to hold rosuvastatin 40 mg daily until transaminases normalize - improving daily   - S/p Digoxin 125 mcg PO (3/12, 3/13), digoxin level (3/14): 0.71 - currently discontinued - will readdress for RV protection / HR  - STAT ECHO completed 03/14: LV systolic function is severely decreased with a 25-30% estimated ejection fraction; There is mildly reduced right ventricular systolic function; Moderate mitral valve regurgitation; Moderate to severe tricuspid regurgitation visualized; There is global hypokinesis of the left ventricle with minor regional variations.    #Advanced therapy evaluation  - Presented to committee 3/12/2024 - concern for end organ failure (possible heart / kidney)   - Not a candidate for transplant at this time per committee discussion 3/12/24  - Discussed in selection committee meeting 03/19 - with multidisciplinary team plan formulated      PULM:   #Acute Hypoxic Respiratory Failure 2/2 pulmonary edema (resolved)  ETT (2/21-2/24)  Remains on RA   - Maintain SpO2 > 92%     GI:    #Nausea  #Constipation   #Diarrhea - resolved   #Emesis (dark blood - clots) - resolved   #Right sided flank pain - improved   - Repeat CT chest/abd/pelvis w/o contrast (3/17): no acute abdomen    - C/w Zofran PRN   - C/w Olanzapine 2.5 mg IM TID PRN   - Resume Christopher-Colace PRN / Miralax PRN (no BM x 24 hours)   - Patient had emesis x1 with dark blood in vomit -> GI consult placed 3/17 - signed off   - Protonix gtt initiated 3/17 x 72 hours - stop 03/20 @ 1900  - Sent stool for C-Diff -> Negative (03/17)    #Hx of gastric bypass   #Hx of MMF colitis  #Acute transaminitis   - Continue home PPI, calcitriol 0.5 mg daily, multivitamin  - Continue PRN miralax prn & christopher-colace   - Trend LFTs daily - improving 03/18     :   #Acute Renal Failure 2/2 to cardiorenal syndrome (on IHD)  Baseline Cr 1.2-1.3 - now aneuric   CVVH stopped 2/27  - RFP daily and PRN  - Tablo (completed 03/15, 03/16, & 03/17) / SLED / CVVH per daily assessment - SLED 03/19 per nephrology    - Transplant nephrology following closely, appreciate assistance   - Per Dr. Wright, every day fluid removal - route to be determined by nephrology      ENDO:   #T2DM  Steroid induced hyperglycemia acceptable glycemic control on SSI  - Maintain BG <180 with hypoglycemia protocol  - Continue SSI     #Hypothyroidism  TSH (3/17): 20.74, T4 1.11, T3: 1.4    - Continue synthroid 200 mcg daily   - Endocrine following, appreciate assistance      HEME:   #Acute DVT LIJ and SVT left cephalic vein and right cephalic vein   #Iron deficiency anemia  #Acute blood loss anemia   #Multiple transfusions   #Hemolysis - resolving slowly   UE and LE Venous duplex (3/6/24): right acute occlusive superficial venous thrombosis visualized in the mid cephalic and acute occlusive superficial venous thrombosis visualized in the distal cephalic veins, left acute non-occlusive deep vein thrombosis visualized in the internal jugular and acute non-occlusive superficial venous thrombosis  visualized in the mid cephalic veins   UE and LE Venous duplex (3/12): unchanged from prior  Last type and screen: 3/15  - 03/15: Transfused 1 unit leukocyte reduced PRBC's   - 03/16: Transfused 1 unit leukocyte reduced PRBC's   - Heparin impella purge   - ASA on hold d/t low platelet count   - Continue SCDs for DVT prophylaxis  - Continue Aranesp every 2 weeks  - Vascular medicine signed off 3/13, will need discussion regarding long term anticoagulation closer to discharge   - Consulted hematology regarding DIC / ALBERT: unlikely per hematology (03/17)  - Per hematology, trend daily T/D-bilirubin, LDH, haptoglobin, reticulocyte count, fibrinogen, PT/PTT/INR, D-dimer.  - [id number] sent per heme recs: PENDING - the tacrolimus level has been adjusted recently and there may be a tacro-induced TMA.     ID:   #Leukopenia, likely in the setting of IVIG vs infectious process (resolved)  Blood cultures (2/15): NGTD  Afebrile, nontoxic  - Sent infectious workup: blood cultures + respiratory culture - No Growth final report 03/19  - Stopped Broad spectrum abx 03/17: vancomycin + zosyn (3/14-3/17)  - Follow lactate: 03/19: 1.1  - Left groin ECMO cannulation site - wound draining - wound care following appreciate recs - cultures sent - No growth   - Trend temp q4h     PHYSICAL AND OCCUPATIONAL THERAPY: ordered and following     LINES:  PIVs  RIJ trialysis catheter 3/5  R Axillary Impella 3/1    DVT: SCDs  VAP BUNDLE: N/A  ULCER PPX: PPI - pantoprazole gtt until 03/20 @ 1900   GLYCEMIC CONTROL: SSI  BOWEL CARE: Patti-colace, miralax    INDWELLING CATHETER: NA  NUTRITION: Adult diet Regular; 1500 mL fluid      EMERGENCY CONTACT: Extended Emergency Contact Information  Primary Emergency Contact: SAHIL DIMAS  Address: [address] [place] [address] of [place]  Home Phone: [phone]  Mobile Phone: [phone]  Relation: Mother  FAMILY UPDATE: given at bedside  CODE STATUS: Full Code  [facility]    Patient seen and assessed with Dr. Wright   _________________________________________________  DIANA Vargas

## 2024-03-19 NOTE — PROGRESS NOTES
Expressive Therapies Note    Pt found in bedside chair receiving wound care with staff at time of MTI attempt. Her affect was increasingly tense with discomfort. Pt declined services. Pt was agreeable to music therapy follow up at another time.

## 2024-03-19 NOTE — PROGRESS NOTES
HFICU Attending Note    Principal Problem:    Cardiogenic shock (CMS/[facility])  Active Problems:    Heart transplant recipient (CMS/[facility])    ESRD (end stage renal disease) on dialysis (CMS/[facility])    HIRAM (acute kidney injury) (CMS/[facility])    Acute passive congestion of liver    Hyponatremia    Iron deficiency anemia    Encounter for aftercare following heart transplant (CMS/[facility])    Heart transplant as cause of abnormal reaction or later complication (CMS/[facility])    Cardiac transplant rejection (CMS/[facility])    Abnormal findings on diagnostic imaging of heart and coronary circulation    Acute combined systolic (congestive) and diastolic (congestive) heart failure (CMS/[facility])    She is awake and interactive. Able to sit in chair and walk with assistance.    RRT stopped yesterday and she appears to have gained weight + worsening hyponatremia (Na 126). She continues being anuric.    Case discussed in the Advanced HF Therapeutics Committee with recognize that (1) likelihood of cardiac recovery s/p graft failure seems less likely as time goes by, (2) she may be a candidate for re-do cardiac transplantation, but she has high risk features (kidney failure, congestion, prior failure of immunosuppression), (3) per UNOS guidelines she would not be a candidate for combined H/K listing until 6 weeks have passed since kidney failure episode started.    Plan:  -- Switch from milrinone to epinephrine. I wonder if hypotension + tachycardiac are partially related to milrinone subclinical toxicity in setting of renal failure. Hopefully this allows her to achieve higher BP and end organ perfusion.  -- Switch from sirolimus to myfortic. Can she tolerate this immunosuppressive agent from GI perspective?  -- Continue daily ambulation  -- Stay with Impella P4-5, as at this range she seems to be hemolyzing less and getting sufficient support.      This critically ill patient continues to be at-risk for clinically significant deterioration / failure due to the  above mentioned dysfunctional, unstable organ systems.  I have personally identified and managed all complex critical care issues to prevent aforementioned clinical deterioration.  Critical care time is spent at bedside and/or the immediate area and has included, but is not limited to, the review of diagnostic tests, labs, radiographs, serial assessments of hemodynamics, respiratory status, ventilatory management, and family updates.  Time spent in procedures and teaching are reported separately.    Critical care time: __75__ minutes     Lexie Wright MD, MPH  Advanced Heart Failure and Transplant Cardiology  [facility]

## 2024-03-19 NOTE — PROGRESS NOTES
"Palliative Medicine following for:  Complex medical decision making, symptom management, patient/family support    Subjective    Patient reports sleeping well overnight and states her abdominal pain resolved following a large bowel movement overnight. She endorses good appetite and is urinating and having bowel movements appropriately.  She endorses occasional nausea and denies experiencing anxiety or depression.    Symptoms  Pain: Denies  Dyspnea: Denies  Fatigue: Denies  Insomnia: Denies  Drowsiness: Denies  Constipation: Denies   Nausea: See above  Appetite: See above  Anxiety: Denies  Depression: Denies    Objective    Last Recorded Vitals  BP 86/56   Pulse (!) 124   Temp 36.8 °C (98.2 °F) (Temporal)   Resp (!) 35   Ht 1.549 m (5' 0.98\")   Wt 100 kg (220 lb 7.4 oz)   SpO2 97%   BMI 41.68 kg/m²      Physical Exam   Constitutional: Well-developed female in no acute distress.  HEENT: Normocephalic, PERRL. EOMI. No cervical lymphadenopathy. RIJ dialysis line present   Respiratory: No wheezes, rales, or rhonchi. Normal respiratory effort.  Cardiovascular: tachycardic . No murmurs, gallops, or rubs. No JVD. Radial pulses 2+.  Abdominal: Soft, nondistended, nontender to palpation. Bowel sounds present. No hepatosplenomegaly or masses. No CVA tenderness.  Neuro: CN II-XII intact. Alert and ox 3  MSK: No LE edema bilaterally.  Skin: No rashes or wounds.  Psych: Appropriate mood and affect.     Relevant Results   Results for orders placed or performed during the hospital encounter of 02/15/24 (from the past 24 hour(s))   POCT GLUCOSE   Result Value Ref Range    POCT Glucose 196 (H) 74 - 99 mg/dL   Lactate   Result Value Ref Range    Lactate 1.1 0.4 - 2.0 mmol/L   Amylase   Result Value Ref Range    Amylase 35 29 - 103 U/L   Lipase   Result Value Ref Range    Lipase 46 9 - 82 U/L   Blood Gas Venous Full Panel   Result Value Ref Range    POCT pH, Venous 7.38 7.33 - 7.43 pH    POCT pCO2, Venous 40 (L) 41 - 51 mm Hg    " POCT pO2, Venous 35 35 - 45 mm Hg    POCT SO2, Venous 54 45 - 75 %    POCT Oxy Hemoglobin, Venous 50.9 45.0 - 75.0 %    POCT Hematocrit Calculated, Venous 23.0 (L) 36.0 - 46.0 %    POCT Sodium, Venous 125 (L) 136 - 145 mmol/L    POCT Potassium, Venous 4.3 3.5 - 5.3 mmol/L    POCT Chloride, Venous 94 (L) 98 - 107 mmol/L    POCT Ionized Calicum, Venous 1.13 1.10 - 1.33 mmol/L    POCT Glucose, Venous 198 (H) 74 - 99 mg/dL    POCT Lactate, Venous 1.2 0.4 - 2.0 mmol/L    POCT Base Excess, Venous -1.3 -2.0 - 3.0 mmol/L    POCT HCO3 Calculated, Venous 23.7 22.0 - 26.0 mmol/L    POCT Hemoglobin, Venous 7.7 (L) 12.0 - 16.0 g/dL    POCT Anion Gap, Venous 12.0 10.0 - 25.0 mmol/L    Patient Temperature 37.0 degrees Celsius    FiO2 21 %   POCT GLUCOSE   Result Value Ref Range    POCT Glucose 140 (H) 74 - 99 mg/dL   POCT GLUCOSE   Result Value Ref Range    POCT Glucose 206 (H) 74 - 99 mg/dL   Tacrolimus level   Result Value Ref Range    Tacrolimus  3.1 <=15.0 ng/mL   Sirolimus level   Result Value Ref Range    Sirolimus  5.6 4.0 - 20.0 ng/mL   Coagulation Screen   Result Value Ref Range    Protime 21.4 (H) 9.8 - 12.8 seconds    INR 1.9 (H) 0.9 - 1.1    aPTT 27 27 - 38 seconds   CBC and Auto Differential   Result Value Ref Range    WBC 6.3 4.4 - 11.3 x10*3/uL    nRBC 2.6 (H) 0.0 - 0.0 /100 WBCs    RBC 2.49 (L) 4.00 - 5.20 x10*6/uL    Hemoglobin 7.5 (L) 12.0 - 16.0 g/dL    Hematocrit 22.4 (L) 36.0 - 46.0 %    MCV 90 80 - 100 fL    MCH 30.1 26.0 - 34.0 pg    MCHC 33.5 32.0 - 36.0 g/dL    RDW 19.6 (H) 11.5 - 14.5 %    Platelets 68 (L) 150 - 450 x10*3/uL    Neutrophils % 70.8 40.0 - 80.0 %    Immature Granulocytes %, Automated 4.5 (H) 0.0 - 0.9 %    Lymphocytes % 9.0 13.0 - 44.0 %    Monocytes % 14.9 2.0 - 10.0 %    Eosinophils % 0.6 0.0 - 6.0 %    Basophils % 0.2 0.0 - 2.0 %    Neutrophils Absolute 4.43 1.20 - 7.70 x10*3/uL    Immature Granulocytes Absolute, Automated 0.28 0.00 - 0.70 x10*3/uL    Lymphocytes Absolute 0.56 (L) 1.20 -  4.80 x10*3/uL    Monocytes Absolute 0.93 0.10 - 1.00 x10*3/uL    Eosinophils Absolute 0.04 0.00 - 0.70 x10*3/uL    Basophils Absolute 0.01 0.00 - 0.10 x10*3/uL   Calcium, Ionized   Result Value Ref Range    POCT Calcium, Ionized 1.09 (L) 1.1 - 1.33 mmol/L   Bilirubin, Direct   Result Value Ref Range    Bilirubin, Direct 0.7 (H) 0.0 - 0.3 mg/dL   D-dimer, Non VTE   Result Value Ref Range    D-Dimer Non VTE, Quant (ng/mL FEU) 3,178 (H) <=500 ng/mL FEU   Comprehensive metabolic panel   Result Value Ref Range    Glucose 121 (H) 74 - 99 mg/dL    Sodium 126 (L) 136 - 145 mmol/L    Potassium 4.2 3.5 - 5.3 mmol/L    Chloride 92 (L) 98 - 107 mmol/L    Bicarbonate 23 21 - 32 mmol/L    Anion Gap 15 10 - 20 mmol/L    Urea Nitrogen 26 (H) 6 - 23 mg/dL    Creatinine 3.21 (H) 0.50 - 1.05 mg/dL    eGFR 19 (L) >60 mL/min/1.73m*2    Calcium 8.4 (L) 8.6 - 10.6 mg/dL    Albumin 3.3 (L) 3.4 - 5.0 g/dL    Alkaline Phosphatase 252 (H) 33 - 110 U/L    Total Protein 6.3 (L) 6.4 - 8.2 g/dL     (H) 9 - 39 U/L    Bilirubin, Total 1.6 (H) 0.0 - 1.2 mg/dL    ALT 53 (H) 7 - 45 U/L   Fibrinogen   Result Value Ref Range    Fibrinogen 221 200 - 400 mg/dL   Lactate Dehydrogenase   Result Value Ref Range    LDH 1,105 (H) 84 - 246 U/L   Magnesium   Result Value Ref Range    Magnesium 2.53 (H) 1.60 - 2.40 mg/dL   Phosphorus   Result Value Ref Range    Phosphorus 3.6 2.5 - 4.9 mg/dL   Reticulocytes   Result Value Ref Range    Retic % 10.6 (H) 0.5 - 2.0 %    Retic Absolute 0.264 (H) 0.018 - 0.083 x10*6/uL    Reticulocyte Hemoglobin 34 28 - 38 pg    Immature Retic fraction 41.4 (H) <=16.0 %   POCT GLUCOSE   Result Value Ref Range    POCT Glucose 130 (H) 74 - 99 mg/dL     *Note: Due to a large number of results and/or encounters for the requested time period, some results have not been displayed. A complete set of results can be found in Results Review.        Allergies  Dapagliflozin, Empagliflozin, Myfortic [mycophenolate sodium], Topiramate, and  Latex  Medications  Scheduled medications  [Held by provider] aspirin, 81 mg, oral, Daily  calcitriol, 0.5 mcg, oral, Daily  darbepoetin floyd, 100 mcg, subcutaneous, q14 days  ferrous sulfate (325 mg ferrous sulfate), 65 mg of iron, oral, Daily with breakfast  folic acid, 1 mg, oral, Daily  insulin lispro, 0-10 Units, subcutaneous, Before meals & nightly  levothyroxine, 200 mcg, oral, Daily  lidocaine, 1 patch, transdermal, Daily  melatonin, 10 mg, oral, Nightly  multivitamin with minerals, 1 tablet, oral, Daily  mycophenolate, 180 mg, oral, BID  nystatin, 5 mL, Swish & Swallow, q6h  predniSONE, 10 mg, oral, Daily  [Held by provider] rosuvastatin, 40 mg, oral, Nightly  [Held by provider] sirolimus, 3 mg, oral, Daily  [Held by provider] sulfamethoxazole-trimethoprim, 80 mg of trimethoprim, oral, Daily  tacrolimus, 2 mg, oral, q12h TRICIA  traZODone, 50 mg, oral, Nightly  valGANciclovir, 450 mg, oral, q48h      Continuous medications  EPINEPHrine, 0-2 mcg/kg/min (Dosing Weight)  heparin Impella Purge 25 units/mL in 500 mL D5W, 10 mL/hr, Last Rate: 10 mL/hr (03/19/24 1000)  milrinone, 0.25 mcg/kg/min (Dosing Weight), Last Rate: Stopped (03/19/24 0900)  pantoprazole (ProtoNix) infusion, 8 mg/hr, Last Rate: 8 mg/hr (03/19/24 1000)      PRN medications  PRN medications: acetaminophen, benzocaine-menthol, dextrose, dextrose **OR** glucagon, diphenhydrAMINE, glucagon, HYDROmorphone, artificial tears, microfibrllar collagen, mupirocin, OLANZapine, ondansetron, oxyCODONE, polyethylene glycol, sennosides-docusate sodium, sodium chloride, traZODone     Assessment/Plan    Charla is 32 yo female heart transplant recpient (3/2023) who presented on 2/15/24 with signs of acute cellular rejection on heart biopsy 2/16 and multiorgan dysfunction in the setting of ADHF.  Medical workup was notable for cardiogenic shock due to acute systolic heart failure in the setting of transplanted heart rejection and subsequent multi organ failure  including HIRAM and liver congestion. [facility]. We are continuing to follow up for symptom management and support with weekly assessments; and eventual GOC. Primary team currently discussing possible renal transplant listing for heart and kidney.       # OHT (3/2022) now with evidence of mixed ACR   #Cardiac transplant rejection  #CHF with severely reduced EF s/p ICD  #Acute on chronic CHF requiring inotropic support  #Pulmonary HTN  #SLE  #Hypothyroidism     #Acute Pain   -continue dilaudid 0.2 mg iv q3 prn     #Constipation, Resolved      #psychosocial support  - Music therapy  - Spiritual care support      ---------------------------------------------------------------------------------------    Plan of Care discussed with: Updated MD Anuj Pierre, APRN-CNP  and bedside RN on goals of care decision, medication adjustments, and code status     Thank you for allowing us to care for this patient. Palliative Team will continue to follow as needed. Please contact team with any questions or concerns.   Team pager [phone] (weekdays)    The patient was seen, evaluated, and discussed with the attending Physician.  Paige Bennett MD   PGY-1 Internal Medicine

## 2024-03-19 NOTE — PROGRESS NOTES
Physical Therapy    Physical Therapy Treatment    Patient Name: Charla Bowles  MRN: [id number]  Today's Date: 3/19/2024  Time Calculation  Start Time: 1125  Stop Time: 1219  Time Calculation (min): 54 min     Assessment/Plan   PT Assessment  End of Session Communication: Bedside nurse  End of Session Patient Position: Up in chair, Alarm off, not on at start of session  PT Plan  Inpatient/Swing Bed or Outpatient: Inpatient  PT Plan  Treatment/Interventions: Bed mobility, Transfer training, Gait training, Stair training, Balance training, Strengthening, Endurance training, Therapeutic exercise, Therapeutic activity  PT Plan: Skilled PT  PT Frequency: 5 times per week  PT Discharge Recommendations: High intensity level of continued care  PT Recommended Transfer Status: Assist x1  PT - OK to Discharge: Yes    General Visit Information:   PT  Visit  PT Received On: 24  General  Prior to Session Communication: Bedside nurse  Patient Position Received: Alarm off, not on at start of session, Up in chair  General Comment: Pt pleasant and cooperative. Motivated for therapy. R axillary Impella (P-4, 2.0 L/min)- dressings with blood, but intact.    Subjective     Precautions:  Precautions  Medical Precautions: Cardiac precautions, Fall precautions  Precautions Comment: R axillary impella precautions- no pushing/pulling with R UE, No lifting R UE above shoulder height, no R UE humeral EXT past plane of body.    Vital Signs:  Vital Signs  Heart Rate:  (PRE:125; DURINs; POST: 127)  SpO2:  (PRE: 99%; POST: 98%)    Objective     Pain:  Pain Assessment  Pain Assessment: 0-10  Pain Score:  (Pt having swelling and soreness in LEs)    Cognition:  Cognition  Overall Cognitive Status: Within Functional Limits  Arousal/Alertness: Appropriate responses to stimuli  Orientation Level: Oriented X4  Following Commands: Follows all commands and directions without difficulty  Activity Tolerance  Early Mobility/Exercise Safety  Screen: Proceed with mobilization - No exclusion criteria met    Treatments:  Therapeutic Activity  Therapeutic Activity Performed: Yes  Therapeutic Activity 1: Pt completed recumbent bike activity: 10 minutes total but about 1-2 minute bouts at a time then 30 second-1 minute rest 2/2 LE fatigue/discomfort. Variable RPM.  Therapeutic Activity 2: Pt completed repeated sit<->stand trials x 8 reps with minAx1 initially, progressing to CGA.    Ambulation/Gait Training  Ambulation/Gait Training Performed: Yes  Ambulation/Gait Training 1  Surface 1: Level tile  Device 1: Rolling walker  Assistance 1: Contact guard  Quality of Gait 1: Wide base of support, Forward flexed posture (heavy reliance on walker)  Comments/Distance (ft) 1: 50 ft x 2 with seated bike activity in between trials  Transfers  Transfer: Yes  Transfer 1  Transfer From 1: Sit to  Transfer to 1: Stand  Technique 1: Sit to stand  Transfer Device 1: Walker  Transfer Level of Assistance 1: Moderate assistance (x1 person; cues for hand placement)  Trials/Comments 1: increased time to rise  Transfers 2  Transfer From 2: Stand to  Transfer to 2: Sit  Technique 2: Stand to sit  Transfer Device 2: Walker  Transfer Level of Assistance 2: Minimum assistance (x1 person; cues for sequencing/hand placement. Decreased eccentric control.)    Outcome Measures:  [facility] Basic Mobility  Turning from your back to your side while in a flat bed without using bedrails: A little  Moving from lying on your back to sitting on the side of a flat bed without using bedrails: A little  Moving to and from bed to chair (including a wheelchair): A lot  Standing up from a chair using your arms (e.g. wheelchair or bedside chair): A lot  To walk in hospital room: A little  Climbing 3-5 steps with railing: Total  Basic Mobility - Total Score: 14    Confusion Assessment Method-ICU (CAM-ICU)  Feature 1: Acute Onset or Fluctuating Course: Negative  Overall CAM-ICU:  Negative    FSS-ICU  Ambulation: Walks >/ or equal to 150 feet with minimal assistance x1  Rolling: Supervision or set-up only  Sitting: Supervision or set-up only  Transfer Sit-to-Stand: Moderate assistance (performs 50 - 74% of task)  Transfer Supine-to-Sit: Minimal assistance (performs 75% or more of task)  Total Score: 21    ICU Mobility Screen  Early Mobility/Exercise Safety Screen: Proceed with mobilization - No exclusion criteria met  E = Exercise and Early Mobility  Early Mobility/Exercise Safety Screen: Proceed with mobilization - No exclusion criteria met  Current Activity: Ambulating in [place]    Education Documentation  Mobility Training, taught by Melani Mccauley, PT at 3/19/2024  3:29 PM.  Learner: Patient  Readiness: Acceptance  Method: Explanation  Response: Verbalizes Understanding    Education Comments  No comments found.      Encounter Problems       Encounter Problems (Active)       Balance       Pt will demonstrate improved sitting/standing static/dynamic balance activities without LOB for increase in safety prior to DC.  (Progressing)       Start:  03/01/24    Expected End:  04/01/24               Mobility       Pt will ambulate 200ft with appropriate form, CGA or less, LRAD, and no LOB for safe DC.  (Met)       Start:  03/01/24    Expected End:  03/15/24    Resolved:  03/18/24    Updated to: Pt will ambulate >500 ft with LRAD and supervision with RPD </= 3/10 and RPE </= 13/20.    Update reason: Goal Met         Pt will tolerate >15 minutes of upright standing activity without seated rest breaks with no changes in vital signs for improved functional mobility.  (Progressing)       Start:  03/01/24    Expected End:  04/01/24            Pt will ascend/descend 3 steps with HR with CGA or less in order to safely access her home upon DC.  (Progressing)       Start:  03/01/24    Expected End:  04/01/24            Pt will ambulate >500 ft with LRAD and supervision with RPD </= 3/10 and RPE </= 13/20.  (Progressing)       Start:  03/18/24    Expected End:  04/01/24                   Transfers       Pt will perform bed mobility and sit<>stand transfers with CGA or less and use of LRAD to safety DC.  (Progressing)       Start:  03/01/24    Expected End:  04/01/24                 Signed by Melani Mccauley DPT

## 2024-03-19 NOTE — PROGRESS NOTES
Review of patient. Supported by Impella. ESRD & on dialysis.  Committee Board to review again in 2 weeks (around 3/17) candidacy for another heart transplant/kidney. The medical team ongoing discussion if able to wean off impella.     Claribel Diaz (LSW, MSW)

## 2024-03-20 NOTE — PROGRESS NOTES
[facility]  HFICU PROGRESS NOTE    Charla Bowles/[id number]    Admit Date: 2/15/2024  Hospital Length of Stay: 34   ICU Length of Stay: 9d 19h   Primary Service: HFICU  Primary HF Cardiologist: Dr. Cornell  Referring: Dr Cornell     INTERVAL EVENTS / PERTINENT ROS:     No acute events overnight. Patient did not get much sleep overnight. Placement signal alarms on P-level 4 remain stable.     Plan:  - SLED for volume removal  ~ 3L   - Check CVP pre and post dialysis   - Stop protonix gtt   - Optometry consult per patients request      MEDICATIONS  Infusions:  EPINEPHrine, Last Rate: 0.03 mcg/kg/min (03/20/24 1000)  heparin Impella Purge 25 units/mL in 500 mL D5W, Last Rate: 10 mL/hr (03/20/24 1000)  milrinone, Last Rate: Stopped (03/19/24 0900)  pantoprazole (ProtoNix) infusion, Last Rate: 8 mg/hr (03/20/24 1000)      Scheduled:  [Held by provider] aspirin, 81 mg, Daily  calcitriol, 0.5 mcg, Daily  darbepoetin floyd, 100 mcg, q14 days  ferrous sulfate (325 mg ferrous sulfate), 65 mg of iron, Daily with breakfast  folic acid, 1 mg, Daily  insulin lispro, 0-10 Units, Before meals & nightly  levothyroxine, 200 mcg, Daily  lidocaine, 1 patch, Daily  melatonin, 10 mg, Nightly  multivitamin with minerals, 1 tablet, Daily  mycophenolate, 180 mg, BID  nystatin, 5 mL, q6h  potassium chloride, 20 mEq, Once  predniSONE, 10 mg, Daily  [Held by provider] rosuvastatin, 40 mg, Nightly  [Held by provider] sirolimus, 3 mg, Daily  [Held by provider] sulfamethoxazole-trimethoprim, 80 mg of trimethoprim, Daily  tacrolimus, 2 mg, q12h TRICIA  traZODone, 50 mg, Nightly  valGANciclovir, 450 mg, q48h      PRN:  acetaminophen, 975 mg, q8h PRN  benzocaine-menthol, 1 lozenge, q2h PRN  dextrose, 25 g, q15 min PRN  dextrose, 25 g, q15 min PRN   Or  glucagon, 1 mg, q15 min PRN  diphenhydrAMINE, 25 mg, q5 min PRN  glucagon, 1 mg, q15 min PRN  HYDROmorphone, 0.2 mg, q2h PRN  artificial tears, 2 drop,  "PRN  microfibrllar collagen, , PRN  mupirocin, , BID PRN  OLANZapine, 2.5 mg, BID PRN  ondansetron, 4 mg, q4h PRN  oxyCODONE, 5 mg, q4h PRN  polyethylene glycol, 17 g, Daily PRN  sennosides-docusate sodium, 1 tablet, Nightly PRN  sodium chloride, 1 spray, 4x daily PRN  traZODone, 25 mg, Nightly PRN        Impella:      Most Recent Range Past 24hrs   Performance Level 4 P Level  Min: 4   Min taken time: 03/20/24 1000  Max: 4   Max taken time: 03/20/24 1000   Flow (L/min) 2.2 Flow (L/min)  Min: 1.6   Min taken time: 03/19/24 1900  Max: 2.3   Max taken time: 03/19/24 1400   Motor Current 221/157 Motor Current  Min: 216/156   Min taken time: 03/20/24 0500  Max: 224/156   Max taken time: 03/20/24 0000   Placement Signal Yes  Placement OK could not be evaluated. This SmartLink does not work with rows of the type: Custom List   Purge (mmHg) 570 Purge Pressure (mmHg)  Min: 416   Min taken time: 03/19/24 1300  Max: 580   Max taken time: 03/20/24 0200   Purge rate (mL/hr) 11 Purge Rate (mL/hr)  Min: 11   Min taken time: 03/20/24 1000  Max: 12   Max taken time: 03/19/24 1700       PHYSICAL EXAM:   Visit Vitals  BP 96/80   Pulse (!) 118   Temp 36.5 °C (97.7 °F) (Temporal)   Resp (!) 37   Ht 1.549 m (5' 0.98\")   Wt 99.6 kg (219 lb 9.3 oz)   SpO2 99%   BMI 41.51 kg/m²   OB Status Hysterectomy   Smoking Status Never   BSA 2.07 m²     Wt Readings from Last 5 Encounters:   03/19/24 99.6 kg (219 lb 9.3 oz)   12/07/23 92.1 kg (203 lb)   12/01/23 93 kg (205 lb)   11/29/23 92.9 kg (204 lb 12.8 oz)   11/09/23 91.3 kg (201 lb 3.2 oz)     INTAKE/OUTPUT:  I/O last 3 completed shifts:  In: 2958.5 (29.7 mL/kg) [P.O.:840; I.V.:2118.5 (21.3 mL/kg)]  Out: 2501 (25.1 mL/kg) [Other:2500; Stool:1]  Dosing Weight: 99.6 kg        Physical Exam  Constitutional:       General: She is not in acute distress.     Appearance: Normal appearance. She is not ill-appearing.   HENT:      Head: Normocephalic.      Mouth/Throat:      Mouth: Mucous membranes are " moist.   Eyes:      Extraocular Movements: Extraocular movements intact.      Pupils: Pupils are equal, round, and reactive to light.   Neck:      Comments: RIJ dialysis line present   Cardiovascular:      Rate and Rhythm: Regular rhythm. Tachycardia present.      Pulses: Normal pulses.      Heart sounds: Normal heart sounds.   Pulmonary:      Effort: Pulmonary effort is normal. No respiratory distress.      Breath sounds: Normal breath sounds.   Chest:      Comments: Right axillary impella site CDI   Abdominal:      General: Bowel sounds are normal.      Palpations: Abdomen is soft.      Tenderness: There is no abdominal tenderness.   Musculoskeletal:         General: Normal range of motion.      Cervical back: Normal range of motion.      Right lower leg: No edema.      Left lower leg: No edema.   Skin:     General: Skin is warm.      Capillary Refill: Capillary refill takes 2 to 3 seconds.      Comments: R femoral SGC + cordis CDI site c/d/I line now removed     Left groin ECMO cannulation site with drainage    Neurological:      General: No focal deficit present.      Mental Status: She is alert and oriented to person, place, and time.   Psychiatric:         Behavior: Behavior normal. Behavior is cooperative.       DATA:  CMP:  Results from last 7 days   Lab Units 03/20/24  0607 03/19/24  0605 03/18/24  1244 03/18/24  0315 03/17/24  1533 03/17/24  0025 03/16/24  1433 03/16/24  0813 03/16/24  0528 03/15/24  1217 03/15/24  0810 03/15/24  0324   SODIUM mmol/L 130* 126*  --  128* 128* 130* 125* 129* 131* 127* 128* 128*   POTASSIUM mmol/L 3.8 4.2  --  3.9 4.2 3.5 3.5 2.7* 2.6* 3.8 3.7 4.0   CHLORIDE mmol/L 96* 92*  --  94* 92* 93* 90* 93* 94* 92* 93* 93*   CO2 mmol/L 23 23  --  25 24 28 25 25 26 25 25 25   ANION GAP mmol/L 15 15  --  13 16 13 14 14 14 14 14 14   BUN mg/dL 17 26*  --  13 8 2* 9 5* 3* 14 12 7   CREATININE mg/dL 2.38* 3.21*  --  1.82* 1.21* 0.31* 1.27* 0.75 0.51 1.99* 1.73* 1.36*   EGFR mL/min/1.73m*2  27* 19*  --  37* 61 >90 57* >90 >90 33* 40* 53*   MAGNESIUM mg/dL 2.21 2.53*  --  2.49* 1.90 1.87 1.96  --  2.11 3.04* 2.38 1.83   ALBUMIN g/dL 3.4 3.3*  --  3.5 3.4 3.4 3.3* 3.2* 3.3* 3.3* 3.1* 3.3*   ALT U/L 52* 53*  --  70* 71* 84* 79* 80* 82* 60* 52* 51*   AST U/L 83* 103*  --  175* 213* 295* 323* 343* 340* 259* 222* 215*   BILIRUBIN TOTAL mg/dL 1.5* 1.6*  --  2.2* 2.6* 3.6* 4.2* 3.9* 3.4* 2.9* 2.6* 2.4*   LIPASE U/L  --   --  46  --  93*  --   --   --   --   --   --   --        CBC:  Results from last 7 days   Lab Units 03/20/24  0607 03/19/24  0605 03/18/24  0315 03/17/24  1444 03/17/24  0025 03/16/24  1433 03/16/24  0528 03/15/24  1217   WBC AUTO x10*3/uL 7.6 6.3 7.4 8.9 7.4 7.7 6.1 6.9   HEMOGLOBIN g/dL 7.7* 7.5* 7.5* 7.7* 8.6* 8.1* 7.1* 7.6*   HEMATOCRIT % 24.5* 22.4* 22.5* 23.5* 24.1* 23.1* 20.9* 22.7*   PLATELETS AUTO x10*3/uL 58* 68* 58* 99* 62* 68* 47* 54*   MCV fL 98 90 94 95 87 89 91 92       COAG:   Results from last 7 days   Lab Units 03/20/24  0607 03/19/24  0605 03/18/24  0315 03/17/24  0025 03/16/24  1433 03/16/24  0528 03/14/24  2214 03/14/24  0623   INR  1.7* 1.9* 2.1* 2.2* 2.2* 2.1* 1.8* 1.5*       ABO:   ABO TYPE   Date Value Ref Range Status   03/18/2024 O  Final       HEME/ENDO:  Results from last 7 days   Lab Units 03/18/24  0315 03/17/24  1533   TSH mIU/L 20.74* 15.88*          CARDIAC:   Results from last 7 days   Lab Units 03/20/24  0607 03/19/24  0605 03/18/24  0315 03/17/24  0025 03/16/24  1433 03/16/24  0813 03/16/24  0528 03/15/24  1637   LD U/L 1,005* 1,105* 1,543* 2,111* 2,170* 2,167* 2,139* 1,816*         ASSESSMENT AND PLAN:   Charla Bowles is a 32 y/o F with PMHx of heart transplant in March 2022 with postoperative course c/b upper extremity/internal jugular DVTs, and 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 She was admitted to HFICU on 2/15 with concern for cardiogenic shock 2/2 allograft rejection and decompensated heart failure with multiorgan dysfunction including  significant elevation of liver enzymes and nonoliguric acute kidney injury. Endomyocardial biopsy on 2/16 revealed mild ACR with +CD4s and negative HLAs, however multisystem organ failure persisted with increased inotropic requirements. IABP placed on 2/18. Transferred to CTICU on 2/19 for [facility] ECMO cannulation with Dr. Marina for persistent multi-organ system dysfunction. Intubated 2/21 for respiratory failure 2/2 pulmonary edema, extubated 2/24. ECMO de-cannulated 2/29/24 but had worsening HIRAM requiring CRRT and overall declined clinical status and IABP was transitioned to R axillary impella 5.5 on 3/1. Transferred from CTICU to HFICU on 3/10 for further management. Continued on milrinone gtt @ 0.25 mcg/kg/min and impella support decreased to P-level 5 on 3/11. Completed PLEX/IVIG on 3/13. Went for RHC 3/13: RA: 16, RV: 43/1, PA: 43/12, PCWP: 23, PA-SAT: 47%, CO: 5.14, CI: 2.64 on P5 of Impella 5.5 and milrinone 0.25 mcg/kg/min. Impella was increased to P6 shortly after, and overnight 3/14 patient's mixed venous dropped from 58->32, CXR was ordered and SGC was in appropriate position. Repeat mixed venous confirmed low value of 37. Impella increased to P8 and stat ECHO ordered 3/14. WBC continued to down trend 3/14 which prompted new infectious workup to be sent, and patient to be started on broad spectrum abx. Patient transitioned to tablo from iHD for better volume removal given elevated CVPs 3/14. 03/15: Malpositioned Impella adjusted at the bedside under echo by Dr. Marina and Dr. Melo. Impella pulled back ~ 1 - 2 cm and confirmed placement, pushed back in 1 cm in the evening by Dr Lewis for placement alarms. [place] removed 3/16 and P level decreased to 6 due to continued high hemolysis. 03/17: LDH has plateaued @ 2111. CT scan chest, abd, pelvis complete w/o acute ABD. 03/18: Impella purge solution transitioned from sodium bicarb to heparin solution. 03/19: Milrinone gtt discontinued and transitioned to Epinephrine  gtt to improve blood pressures. Initiated Myfortic low dose BID, Discontinued Sirolimus, Increased Tacrolimus to 2 mg BID (goal: 8 - 10). Continue with dialysis daily per Dr Wright with nephrology's recommendations.       NEURO:   #Acute Pain   #Insomnia  - Continue tylenol 975 mg Q8 PRN for mild pain   - Continue oxycodone 5 mg Q4 PRN for moderate pain   - Continue Diluadid 0.2 mg q3 PRN for severe pain   - Lidocaine patch PRN   - Continue melatonin 10 mg nightly   - Continue trazodone 50 mg nightly for insomnia  - PT/OT   - CAM ICU score q shift  - Sleep/wake cycle hygiene  - Serial neuro and pain assessments     ENT:  #Epistaxis (resolved)  - Significant epistaxis 2/28 and 3/3 requiring ENT consult, packing removed by ENT 3/5  - S/p ocean spray 5x day x10 days completed   - Ocean spray and mupiricin PRN for dry nasal passages     CARDIAC:  #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)  Toxo: -/-   Hep C: -/-     Rejection/Prophylaxis (transplant):  - C/w Steroids: 10 mg PO prednisone daily  - Increase Tacrolimus: 2 mg PO @ 0630 & 2 mg PO @ 1830 w/ daily levels drawn @ 0600 (last change 3/19)  - Sirolimus discontinued 3/19 (stopped 3/11)   - C/w low dose Myfortic 180 mg BID (Started 3/19)  - Tacrolimus goal troughs: 8 - 10 (Goal Changed 03/19) , daily level 3/20: 3.3  - Antifungals: nystatin oral suspension 5 mls q6  - Antivirals: Valcyte 450 mg q48 due to low grade viremia   - HOLDING Anti PCP & Toxoplasmosis: Bactrim SS daily --> holding due to thrombocytopenia (2/23)     Last cardiac biopsy: 2/16/24 with ACR1 and no AMR  Last HLA (2/16/24): negative for DSAs   Last RHC (3/13/24): RA: 16, RV: 43/1, PA: 43/12, PCWP: 23, PA-SAT: 47%, CO: 5.14, CI: 2.64 on P5 of Impella 5.5 and milrinone 0.25 mcg/kg/min   Last LHC (2/18/24): negative for CAV and CAD   Last TTE/BOB (3/1/24): shows LVEF to 30% and mild RV dysfunction (intra-op impella 5.5  insert)  Osteopenia/osteoporosis prophylaxis: Vitamin D3 and calcium supplements  Peptic/gastric ulcer prophylaxis: Pantoprazole 40 mg daily   CAV Prophylaxis: HOLDING Aspirin 81 mg daily 2/2 thrombocytopenia & HOLDING rosuvastatin 40 mg nightly 2/2 transaminitis     - Unclear cause of acute severe graft dysfunction. Most likely smoldering ACR vs. AMR; however, 2x allograft biopsies on 2/16 & 2/20 remain negative for any significant pathology. Notably, both biopsies taken after rejection therapies implemented which may have reduced areas of graft damage.    - DSAs remain negative; however, patient may have non-HLA antibodies present. 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   - Negative CAV & CAD via LHC on 2/18/24   - Completed methylpred steroid pulse w/ 1g q24 x 3 days (2/16-2/18) and prednisone 10 mg daily   - Thymoglobulin doses: 2/18 & 2/19   - IVIG + PLEX Sessions completed: 2/18, 2/20, 3/7, 3/11, 3/13 - completed   - Graft function slightly worse after transition to impella 5.5 on 3/1. 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  #Impella 5.5 support (3/1/24)  RHC (3/13/24): RA: 16, RV: 43/1, PA: 43/12, PCWP: 23, PA-SAT: 47%, CO: 5.14, CI: 2.64 on P5 of Impella 5.5, milrinone 0.25 mcg/kg/min, hydralazine 100 mg q8, isordil 40 mg q8  Opening SGC #s (3/13): BP 94/71 (79), PAP 42/30 (35), CVP 13, , CO/CI (kyra) 5.46/2.80, SVO2 56% on P5 of Impella 5.5, milrinone 0.25 mcg/kg/min, hydralazine 100 mg q8, isordil 40 mg q8  Daily/ Closing SGC #s (3/16): /86 (97), CVP 20, PAP 35/25 (42), SVR 1115, CO/CI (kyra) 5.5/2.8, SVO2 51% on P7 of Impella 5.5, milrinone 0.25 mcg/kg/min, hydralazine 50 mg q8, isordil 20 mg q8  - Maintain goal MAP 70-90  - Impella P level maintaining at P4, wean per clinical picture / hemodynamics - goal maintain P4   - Transitioned to  Heparin purge for Impella per Dr. Wright (03/19)  - Discontinue milrinone gtt 0.25 mcg/kg/min (03/19)  - C/w Epinephrine gtt for improved blood pressure to increase fluid removal (03/19)  - Discontinued afterload reduction with PO hydralazine 50 mg q8 + PO isosorbide 20 mg q8 2/2 hypotension    - Continue ASA - Currently HELD 2/2 low platelet count  - Continue to hold rosuvastatin 40 mg daily until transaminases normalize - improving daily   - S/p Digoxin 125 mcg PO (3/12, 3/13), digoxin level (3/14): 0.71 - currently discontinued - will readdress for RV protection / HR  - STAT ECHO completed 03/14: LV systolic function is severely decreased with a 25-30% estimated ejection fraction; There is mildly reduced right ventricular systolic function; Moderate mitral valve regurgitation; Moderate to severe tricuspid regurgitation visualized; There is global hypokinesis of the left ventricle with minor regional variations.    #Advanced therapy evaluation  - Presented to committee 3/12/2024 - concern for end organ failure (possible heart / kidney)   - Not a candidate for transplant at this time per committee discussion 3/12/24  - Discussed in selection committee meeting 03/19 -   (1) likelihood of cardiac recovery s/p graft failure seems less likely as time goes by  (2) she may be a candidate for re-do cardiac transplantation, but she has high risk features (kidney failure, congestion, prior failure of immunosuppression)  (3) per UNOS guidelines she would not be a candidate for combined H/K listing until 6 weeks have passed since kidney failure episode started.      PULM:   #Acute Hypoxic Respiratory Failure 2/2 pulmonary edema (resolved)  ETT (2/21-2/24)  Remains on RA   - Maintain SpO2 > 92%     GI:   #Nausea  #Constipation   #Diarrhea - resolved   #Emesis (dark blood - clots) - resolved   #Right sided flank pain - improved   - Repeat CT chest/abd/pelvis w/o contrast (3/17): no acute abdomen    - C/w Zofran PRN   - C/w  Olanzapine 2.5 mg IM TID PRN   - Resume Christopher-Colace PRN / Miralax PRN (no BM x 24 hours)   - Patient had emesis x1 with dark blood in vomit -> GI consult placed 3/17 - signed off   - Protonix gtt initiated 3/17 x 72 hours - stopped 03/20 @ 1900  - Sent stool for C-Diff -> Negative (03/17)    #Hx of gastric bypass   #Hx of MMF colitis  #Acute transaminitis   - Continue home PPI, calcitriol 0.5 mg daily, multivitamin  - Continue PRN miralax prn & christopher-colace   - Trend LFTs daily - improving 03/18     :   #Acute Renal Failure 2/2 to cardiorenal syndrome (on IHD)  Baseline Cr 1.2-1.3 - now aneuric   CVVH stopped 2/27  - RFP daily and PRN  - Tablo (completed 03/15, 03/16, & 03/17) / SLED / CVVH per daily assessment - SLED 03/19 and 3/20 per nephrology    - Transplant nephrology following closely, appreciate assistance   - Per Dr. Wright, every day fluid removal - route to be determined by nephrology      ENDO:   #T2DM  Steroid induced hyperglycemia acceptable glycemic control on SSI  - Maintain BG <180 with hypoglycemia protocol  - Continue SSI     #Hypothyroidism  TSH (3/17): 20.74, T4 1.11, T3: 1.4    - Continue synthroid 200 mcg daily   - Endocrine following, appreciate assistance      HEME:   #Acute DVT LIJ and SVT left cephalic vein and right cephalic vein   #Iron deficiency anemia  #Acute blood loss anemia   #Multiple transfusions   #Hemolysis - resolving slowly   UE and LE Venous duplex (3/6/24): right acute occlusive superficial venous thrombosis visualized in the mid cephalic and acute occlusive superficial venous thrombosis visualized in the distal cephalic veins, left acute non-occlusive deep vein thrombosis visualized in the internal jugular and acute non-occlusive superficial venous thrombosis visualized in the mid cephalic veins   UE and LE Venous duplex (3/12): unchanged from prior  Last type and screen: 3/15  - 03/15: Transfused 1 unit leukocyte reduced PRBC's   - 03/16: Transfused 1 unit leukocyte  reduced PRBC's   - Heparin impella purge check ACT Q4 hours   - ASA on hold d/t low platelet count   - Continue SCDs for DVT prophylaxis  - Continue Aranesp every 2 weeks  - Vascular medicine signed off 3/13, will need discussion regarding long term anticoagulation closer to discharge   - Consulted hematology regarding DIC / ALBERT: unlikely per hematology (03/17)  - Per hematology, trend daily T/D-bilirubin, LDH, haptoglobin, reticulocyte count, fibrinogen, PT/PTT/INR, D-dimer.  - [id number] sent per heme recs: PENDING - the tacrolimus level has been adjusted recently and there may be a tacro-induced TMA.     ID:   #Leukopenia, likely in the setting of IVIG vs infectious process (resolved)  Blood cultures (2/15): NGTD  Afebrile, nontoxic  - Sent infectious workup: blood cultures + respiratory culture - No Growth final report 03/19  - Stopped Broad spectrum abx 03/17: vancomycin + zosyn (3/14-3/17)  - Follow lactate: 03/19: 1.1  - Left groin ECMO cannulation site - wound draining - wound care following appreciate recs - cultures sent - No growth   - Trend temp q4h     PHYSICAL AND OCCUPATIONAL THERAPY: ordered and following     LINES:  PIVs  RIJ trialysis catheter 3/5 (discussed line in rounds this AM as patient has no other access)   R Axillary Impella 3/1    DVT: SCDs  VAP BUNDLE: N/A  ULCER PPX: PPI - pantoprazole gtt until 03/20 @ 1900   GLYCEMIC CONTROL: SSI  BOWEL CARE: Patti-colace, miralax    INDWELLING CATHETER: NA  NUTRITION: Adult diet Regular; 1500 mL fluid      EMERGENCY CONTACT: Extended Emergency Contact Information  Primary Emergency Contact: SAHIL DIMAS  Address: [address] [place] [address]  Home Phone: [phone]  Mobile Phone: [phone]  Relation: Mother  FAMILY UPDATE: given at bedside  CODE STATUS: Full Code  DISPO: remain in HFICU    Patient seen and assessed with Dr. Wright   _________________________________________________  Estephania WILL  JIMMY Enriquez-CNP

## 2024-03-20 NOTE — PROGRESS NOTES
HFICU Attending Note    Principal Problem:    Cardiogenic shock (CMS/[facility])  Active Problems:    Heart transplant recipient (CMS/[facility])    ESRD (end stage renal disease) on dialysis (CMS/[facility])    HIRAM (acute kidney injury) (CMS/[facility])    Acute passive congestion of liver    Hyponatremia    Iron deficiency anemia    Encounter for aftercare following heart transplant (CMS/[facility])    Heart transplant as cause of abnormal reaction or later complication (CMS/[facility])    Cardiac transplant rejection (CMS/[facility])    Abnormal findings on diagnostic imaging of heart and coronary circulation    Acute combined systolic (congestive) and diastolic (congestive) heart failure (CMS/[facility])    She was switched from milrinone to epinephrine infusion over the last day, and with that she has less hypotension / higher BP, her AST/ALT are improving, and hyponatremia is better today. She was transitioned from sirolimus to myfortic yesterday. She remains in good spirits, and is able to ambulate. Impella support at P4.    This critically ill patient continues to be at-risk for clinically significant deterioration / failure due to the above mentioned dysfunctional, unstable organ systems.  I have personally identified and managed all complex critical care issues to prevent aforementioned clinical deterioration.  Critical care time is spent at bedside and/or the immediate area and has included, but is not limited to, the review of diagnostic tests, labs, radiographs, serial assessments of hemodynamics, respiratory status, ventilatory management, and family updates.  Time spent in procedures and teaching are reported separately.    Critical care time: __35__ minutes

## 2024-03-20 NOTE — PROGRESS NOTES
Physical Therapy    Physical Therapy Treatment    Patient Name: Charla Bowles  MRN: [id number]  Today's Date: 3/20/2024  Time Calculation  Start Time: 1359  Stop Time: 1443  Time Calculation (min): 44 min     Assessment/Plan   PT Assessment  End of Session Communication: Bedside nurse  End of Session Patient Position: Up in chair, Alarm off, caregiver present  PT Plan  Inpatient/Swing Bed or Outpatient: Inpatient  PT Plan  Treatment/Interventions: Bed mobility, Transfer training, Gait training, Stair training, Balance training, Strengthening, Endurance training, Therapeutic exercise, Therapeutic activity  PT Plan: Skilled PT  PT Frequency: 5 times per week  PT Discharge Recommendations: High intensity level of continued care  PT Recommended Transfer Status: Assist x1  PT - OK to Discharge: Yes    General Visit Information:   PT  Visit  PT Received On: 03/20/24  General  Prior to Session Communication: Bedside nurse  Patient Position Received: Bed, 3 rail up, Alarm off, not on at start of session  General Comment: Pt motivated for therapy. R Axillary Impella (P-4, 2.2 L/min)- dressings with blood but intact. Epi .03 mcg.    Subjective     Precautions:  Precautions  Medical Precautions: Cardiac precautions, Fall precautions  Precautions Comment: R axillary impella precautions- no pushing/pulling with R UE, No lifting R UE above shoulder height, no R UE humeral EXT past plane of body.    Vital Signs:  Vital Signs  Heart Rate:  (HR ~120s throughout session)    Objective     Pain:  Pain Assessment  Pain Assessment: 0-10  Pain Score: 0 - No pain    Cognition:  Cognition  Overall Cognitive Status: Within Functional Limits  Arousal/Alertness: Appropriate responses to stimuli  Orientation Level: Oriented X4  Following Commands: Follows all commands and directions without difficulty    Treatments:     Therapeutic Activity  Therapeutic Activity Performed: Yes  Therapeutic Activity 1: Pt completed 10 minutes of recumbent  bike activity with 20-30 second bouts of pedaling at varying speeds- self-selected as pt nauseous.    Ambulation/Gait Training  Ambulation/Gait Training Performed: Yes  Ambulation/Gait Training 1  Surface 1: Level tile  Device 1: Rolling walker  Assistance 1: Contact guard  Quality of Gait 1: Wide base of support (varying gait speed- either slow or quick)  Comments/Distance (ft) 1: 50 ft x 2 with seated recumbent bike activity in between trials  Transfers  Transfer: Yes  Transfer 1  Transfer From 1: Sit to  Transfer to 1: Stand  Technique 1: Sit to stand  Transfer Device 1: Walker  Transfer Level of Assistance 1:  (ModAx1 for transfer from low surface height; minAx1 from elevated surface height; use of momentum to achieve stand)  Trials/Comments 1: x 2 trials  Transfers 2  Transfer From 2: Stand to  Transfer to 2: Sit  Technique 2: Stand to sit  Transfer Device 2: Walker  Transfer Level of Assistance 2: Contact guard (cues for sequencing/hand placement)  Trials/Comments 2: x 2 trials    Outcome Measures:  [facility] Basic Mobility  Turning from your back to your side while in a flat bed without using bedrails: None  Moving from lying on your back to sitting on the side of a flat bed without using bedrails: A little  Moving to and from bed to chair (including a wheelchair): A little  Standing up from a chair using your arms (e.g. wheelchair or bedside chair): A lot  To walk in hospital room: A little  Climbing 3-5 steps with railing: Total  Basic Mobility - Total Score: 16    Confusion Assessment Method-ICU (CAM-ICU)  Feature 1: Acute Onset or Fluctuating Course: Negative  Overall CAM-ICU: Negative    FSS-ICU  Ambulation: Walks >/ or equal to 150 feet with minimal assistance x1  Rolling: Supervision or set-up only  Sitting: Supervision or set-up only  Transfer Sit-to-Stand: Moderate assistance (performs 50 - 74% of task)  Transfer Supine-to-Sit: Supervision or set-up only  Total Score: 22    ICU Mobility Screen  Early  Mobility/Exercise Safety Screen: Proceed with mobilization - No exclusion criteria met  E = Exercise and Early Mobility  Early Mobility/Exercise Safety Screen: Proceed with mobilization - No exclusion criteria met  Current Activity: Ambulating in [place]    Education Documentation  Mobility Training, taught by Melani Mccauley PT at 3/20/2024  3:08 PM.  Learner: Patient  Readiness: Acceptance  Method: Explanation  Response: Verbalizes Understanding    Education Comments  No comments found.      Encounter Problems       Encounter Problems (Active)       Balance       Pt will demonstrate improved sitting/standing static/dynamic balance activities without LOB for increase in safety prior to DC.  (Progressing)       Start:  03/01/24    Expected End:  04/01/24               Mobility       Pt will ambulate 200ft with appropriate form, CGA or less, LRAD, and no LOB for safe DC.  (Met)       Start:  03/01/24    Expected End:  03/15/24    Resolved:  03/18/24    Updated to: Pt will ambulate >500 ft with LRAD and supervision with RPD </= 3/10 and RPE </= 13/20.    Update reason: Goal Met         Pt will tolerate >15 minutes of upright standing activity without seated rest breaks with no changes in vital signs for improved functional mobility.  (Progressing)       Start:  03/01/24    Expected End:  04/01/24            Pt will ascend/descend 3 steps with HR with CGA or less in order to safely access her home upon DC.  (Progressing)       Start:  03/01/24    Expected End:  04/01/24            Pt will ambulate >500 ft with LRAD and supervision with RPD </= 3/10 and RPE </= 13/20. (Progressing)       Start:  03/18/24    Expected End:  04/01/24                   Transfers       Pt will perform bed mobility and sit<>stand transfers with CGA or less and use of LRAD to safety DC.  (Progressing)       Start:  03/01/24    Expected End:  04/01/24                 Signed by Melani Mccauley DPT

## 2024-03-21 NOTE — PROGRESS NOTES
[facility]  HFICU PROGRESS NOTE    Charla Bowles/[id number]    Admit Date: 2/15/2024  Hospital Length of Stay: 35   ICU Length of Stay: 10d 19h   Primary Service: HFICU  Primary HF Cardiologist: Dr. Cornell  Referring: Dr Cornell     INTERVAL EVENTS / PERTINENT ROS:     Overnight patient had vaginal itching with concern for yeast infection. No other events or overnight issues. Placement signal alarms on P-level 4 remain stable. Lactate slightly elevated this AM at 2.4. Dialysis completed overnight, 3L removed with CVP of 10 post dialysis.     Plan:  Trial diuretic challenge -> 100 mg lasix x1 and lasix gtt  Increase to P-5  Recheck labs this afternoon   Diflucan 200 mg x1 for yeast infection concern  Increase tacro dosing to 2.5 mg BID  Increase myfortic to 360 mg BID     MEDICATIONS  Infusions:  EPINEPHrine, Last Rate: 0.03 mcg/kg/min (03/21/24 0800)  furosemide  heparin Impella Purge 25 units/mL in 500 mL D5W, Last Rate: 10 mL/hr (03/21/24 0615)  milrinone, Last Rate: Stopped (03/19/24 0900)      Scheduled:  [Held by provider] aspirin, 81 mg, Daily  calcitriol, 0.5 mcg, Daily  darbepoetin floyd, 100 mcg, q14 days  ferrous sulfate (325 mg ferrous sulfate), 65 mg of iron, Daily with breakfast  folic acid, 1 mg, Daily  furosemide, 100 mg, Once  insulin lispro, 0-10 Units, Before meals & nightly  levothyroxine, 200 mcg, Daily  lidocaine, 1 patch, Daily  melatonin, 10 mg, Nightly  multivitamin with minerals, 1 tablet, Daily  mycophenolate, 180 mg, BID  nystatin, 5 mL, q6h  predniSONE, 10 mg, Daily  [Held by provider] rosuvastatin, 40 mg, Nightly  sennosides-docusate sodium, 1 tablet, BID  [Held by provider] sirolimus, 3 mg, Daily  [Held by provider] sulfamethoxazole-trimethoprim, 80 mg of trimethoprim, Daily  tacrolimus, 2 mg, q12h TRICIA  traZODone, 50 mg, Nightly  valGANciclovir, 450 mg, q48h      PRN:  acetaminophen, 975 mg, q8h PRN  benzocaine-menthol, 1 lozenge, q2h  "PRN  dextrose, 25 g, q15 min PRN  dextrose, 25 g, q15 min PRN   Or  glucagon, 1 mg, q15 min PRN  diphenhydrAMINE, 25 mg, q5 min PRN  glucagon, 1 mg, q15 min PRN  guaiFENesin, 600 mg, BID PRN  HYDROmorphone, 0.2 mg, q2h PRN  artificial tears, 2 drop, PRN  microfibrllar collagen, , PRN  mupirocin, , BID PRN  OLANZapine, 2.5 mg, BID PRN  ondansetron, 4 mg, q4h PRN  oxyCODONE, 5 mg, q4h PRN  polyethylene glycol, 17 g, Daily PRN  sodium chloride, 1 spray, 4x daily PRN  traZODone, 25 mg, Nightly PRN        Impella:      Most Recent Range Past 24hrs   Performance Level 4 P Level  Min: 4   Min taken time: 03/21/24 0700  Max: 4   Max taken time: 03/21/24 0700   Flow (L/min) 2.1 Flow (L/min)  Min: 1.6   Min taken time: 03/21/24 0300  Max: 2.1   Max taken time: 03/21/24 0700   Motor Current 221/160 Motor Current  Min: 211/157   Min taken time: 03/21/24 0600  Max: 224/153   Max taken time: 03/20/24 2100   Placement Signal Yes  Placement OK could not be evaluated. This SmartLink does not work with rows of the type: Custom List   Purge (mmHg) 554 Purge Pressure (mmHg)  Min: 418   Min taken time: 03/20/24 2200  Max: 639   Max taken time: 03/20/24 1200   Purge rate (mL/hr) 11.4 Purge Rate (mL/hr)  Min: 10.8   Min taken time: 03/21/24 0300  Max: 11.6   Max taken time: 03/20/24 1700       PHYSICAL EXAM:   Visit Vitals  BP 84/57   Pulse (!) 124   Temp 36.6 °C (97.9 °F) (Temporal)   Resp 26   Ht 1.549 m (5' 0.98\")   Wt 94.2 kg (207 lb 10.8 oz)   SpO2 99%   BMI 39.26 kg/m²   OB Status Hysterectomy   Smoking Status Never   BSA 2.01 m²     Wt Readings from Last 5 Encounters:   03/21/24 94.2 kg (207 lb 10.8 oz)   12/07/23 92.1 kg (203 lb)   12/01/23 93 kg (205 lb)   11/29/23 92.9 kg (204 lb 12.8 oz)   11/09/23 91.3 kg (201 lb 3.2 oz)     INTAKE/OUTPUT:  I/O last 3 completed shifts:  In: 1545.3 (15.5 mL/kg) [P.O.:600; I.V.:945.3 (9.5 mL/kg)]  Out: 5525 (55.5 mL/kg) [Emesis/NG output:25; Other:5500]  Dosing Weight: 99.6 kg        Physical " Exam  Constitutional:       General: She is not in acute distress.     Appearance: Normal appearance. She is not ill-appearing.   HENT:      Head: Normocephalic.      Mouth/Throat:      Mouth: Mucous membranes are moist.   Eyes:      Extraocular Movements: Extraocular movements intact.      Pupils: Pupils are equal, round, and reactive to light.   Neck:      Comments: RIJ dialysis line present   Cardiovascular:      Rate and Rhythm: Regular rhythm. Tachycardia present.      Pulses: Normal pulses.      Heart sounds: Normal heart sounds.   Pulmonary:      Effort: Pulmonary effort is normal. No respiratory distress.      Breath sounds: Normal breath sounds.   Chest:      Comments: Right axillary impella site CDI   Abdominal:      General: Bowel sounds are normal.      Palpations: Abdomen is soft.      Tenderness: There is no abdominal tenderness.   Musculoskeletal:         General: Normal range of motion.      Cervical back: Normal range of motion.      Right lower leg: No edema.      Left lower leg: No edema.   Skin:     General: Skin is warm.      Capillary Refill: Capillary refill takes 2 to 3 seconds.      Comments: R femoral SGC + cordis CDI site c/d/I line now removed     Left groin ECMO cannulation site with drainage    Neurological:      General: No focal deficit present.      Mental Status: She is alert and oriented to person, place, and time.   Psychiatric:         Behavior: Behavior normal. Behavior is cooperative.       DATA:  CMP:  Results from last 7 days   Lab Units 03/21/24  0609 03/20/24  0607 03/19/24  0605 03/18/24  1244 03/18/24  0315 03/17/24  1533 03/17/24  0025 03/16/24  1433 03/16/24  0813 03/16/24  0528 03/15/24  1217 03/15/24  0810   SODIUM mmol/L 134* 130* 126*  --  128* 128* 130* 125* 129* 131* 127* 128*   POTASSIUM mmol/L 4.2 3.8 4.2  --  3.9 4.2 3.5 3.5 2.7* 2.6* 3.8 3.7   CHLORIDE mmol/L 98 96* 92*  --  94* 92* 93* 90* 93* 94* 92* 93*   CO2 mmol/L 23 23 23  --  25 24 28 25 25 26 25 25  "  ANION GAP mmol/L 17 15 15  --  13 16 13 14 14 14 14 14   BUN mg/dL 14 17 26*  --  13 8 2* 9 5* 3* 14 12   CREATININE mg/dL 1.98* 2.38* 3.21*  --  1.82* 1.21* 0.31* 1.27* 0.75 0.51 1.99* 1.73*   EGFR mL/min/1.73m*2 34* 27* 19*  --  37* 61 >90 57* >90 >90 33* 40*   MAGNESIUM mg/dL 2.11 2.21 2.53*  --  2.49* 1.90 1.87 1.96  --  2.11 3.04* 2.38   ALBUMIN g/dL 3.5 3.4 3.3*  --  3.5 3.4 3.4 3.3* 3.2* 3.3* 3.3* 3.1*   ALT U/L 59* 52* 53*  --  70* 71* 84* 79* 80* 82* 60* 52*   AST U/L 102* 83* 103*  --  175* 213* 295* 323* 343* 340* 259* 222*   BILIRUBIN TOTAL mg/dL 1.8* 1.5* 1.6*  --  2.2* 2.6* 3.6* 4.2* 3.9* 3.4* 2.9* 2.6*   LIPASE U/L  --   --   --  46  --  93*  --   --   --   --   --   --        CBC:  Results from last 7 days   Lab Units 03/21/24  0609 03/20/24  0607 03/19/24  0605 03/18/24  0315 03/17/24  1444 03/17/24  0025 03/16/24  1433 03/16/24  0528   WBC AUTO x10*3/uL 7.2 7.6 6.3 7.4 8.9 7.4 7.7 6.1   HEMOGLOBIN g/dL 8.1* 7.7* 7.5* 7.5* 7.7* 8.6* 8.1* 7.1*   HEMATOCRIT % 24.9* 24.5* 22.4* 22.5* 23.5* 24.1* 23.1* 20.9*   PLATELETS AUTO x10*3/uL 44* 58* 68* 58* 99* 62* 68* 47*   MCV fL 94 98 90 94 95 87 89 91       COAG:   Results from last 7 days   Lab Units 03/21/24  0609 03/20/24  0607 03/19/24  0605 03/18/24  0315 03/17/24  0025 03/16/24  1433 03/16/24  0528 03/14/24  2214   INR  1.8* 1.7* 1.9* 2.1* 2.2* 2.2* 2.1* 1.8*       ABO:   No results found for: \"ABO\"      HEME/ENDO:  Results from last 7 days   Lab Units 03/18/24  0315 03/17/24  1533   TSH mIU/L 20.74* 15.88*          CARDIAC:   Results from last 7 days   Lab Units 03/21/24  0609 03/20/24  0607 03/19/24  0605 03/18/24  0315 03/17/24  0025 03/16/24  1433 03/16/24  0813 03/16/24  0528   LD U/L 958* 1,005* 1,105* 1,543* 2,111* 2,170* 2,167* 2,139*         ASSESSMENT AND PLAN:   Charla Bowles is a 32 y/o F with PMHx of heart transplant in March 2022 with postoperative course c/b upper extremity/internal jugular DVTs, and asymptomatic 2R rejection in " November 2022. She was admitted to HFICU on 2/15 with concern for cardiogenic shock 2/2 allograft rejection and decompensated heart failure with multiorgan dysfunction including significant elevation of liver enzymes and nonoliguric acute kidney injury. Endomyocardial biopsy on 2/16 revealed mild ACR with +CD4s and negative HLAs, however multisystem organ failure persisted with increased inotropic requirements. IABP placed on 2/18. Transferred to CTICU on 2/19 for [facility] ECMO cannulation with Dr. Marina for persistent multi-organ system dysfunction. Intubated 2/21 for respiratory failure 2/2 pulmonary edema, extubated 2/24. ECMO de-cannulated 2/29/24 but had worsening HIRAM requiring CRRT and overall declined clinical status and IABP was transitioned to R axillary impella 5.5 on 3/1. Transferred from CTICU to HFICU on 3/10 for further management. Continued on milrinone gtt @ 0.25 mcg/kg/min and impella support decreased to P-level 5 on 3/11. Completed PLEX/IVIG on 3/13. Went for RHC 3/13: RA: 16, RV: 43/1, PA: 43/12, PCWP: 23, PA-SAT: 47%, CO: 5.14, CI: 2.64 on P5 of Impella 5.5 and milrinone 0.25 mcg/kg/min. Impella was increased to P6 shortly after, and overnight 3/14 patient's mixed venous dropped from 58->32, CXR was ordered and SGC was in appropriate position. Repeat mixed venous confirmed low value of 37. Impella increased to P8 and stat ECHO ordered 3/14. WBC continued to down trend 3/14 which prompted new infectious workup to be sent, and patient to be started on broad spectrum abx. Patient transitioned to tablo from iHD for better volume removal given elevated CVPs 3/14. 03/15: Malpositioned Impella adjusted at the bedside under echo by Dr. Marina and Dr. Melo. Impella pulled back ~ 1 - 2 cm and confirmed placement, pushed back in 1 cm in the evening by Dr Lewis for placement alarms. [place] removed 3/16 and P level decreased to 6 due to continued high hemolysis. 03/17: LDH has plateaued @ 2111. CT scan chest, abd,  pelvis complete w/o acute ABD. 03/18: Impella purge solution transitioned from sodium bicarb to heparin solution. 03/19: Milrinone gtt discontinued and transitioned to Epinephrine gtt to improve blood pressures. Initiated Myfortic low dose BID, Discontinued Sirolimus, Increased Tacrolimus to 2 mg BID (goal: 8 - 10). Received dialysis with SLED 3/19 and 3/20. Plan for diuretic challenge 3/21.       NEURO:   #Acute Pain   #Insomnia  - Continue tylenol 975 mg Q8 PRN for mild pain   - Continue oxycodone 5 mg Q4 PRN for moderate pain   - Continue Diluadid 0.2 mg q3 PRN for severe pain   - Lidocaine patch PRN   - Continue melatonin 10 mg nightly   - Continue trazodone 50 mg nightly for insomnia  - PT/OT   - CAM ICU score q shift  - Sleep/wake cycle hygiene  - Serial neuro and pain assessments     ENT:  #Epistaxis (resolved)  - Significant epistaxis 2/28 and 3/3 requiring ENT consult, packing removed by ENT 3/5  - S/p ocean spray 5x day x10 days completed   - Ocean spray and mupiricin PRN for dry nasal passages     CARDIAC:  #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)  Toxo: -/-   Hep C: -/-     Rejection/Prophylaxis (transplant):  - C/w Steroids: 10 mg PO prednisone daily  - Increase Tacrolimus: 2.5 mg PO @ 0630 & 2.5 mg PO @ 1830 w/ daily levels drawn @ 0600 (last change 3/21)  - Sirolimus discontinued 3/19 (stopped 3/11)   - C/w low dose Myfortic 360 mg BID (Started 3/19, increased 3/21)  - Tacrolimus goal troughs: 8 - 10 (Goal Changed 03/19) , daily level 3/21: 5  - Antifungals: nystatin oral suspension 5 mls q6  - Antivirals: Valcyte 450 mg q48 due to low grade viremia   - HOLDING Anti PCP & Toxoplasmosis: Bactrim SS daily --> holding due to thrombocytopenia (2/23)     Last cardiac biopsy: 2/16/24 with ACR1 and no AMR  Last HLA (2/16/24): negative for DSAs   Last RHC (3/13/24): RA: 16, RV: 43/1, PA: 43/12, PCWP: 23, PA-SAT: 47%, CO: 5.14,  CI: 2.64 on P5 of Impella 5.5 and milrinone 0.25 mcg/kg/min   Last LHC (2/18/24): negative for CAV and CAD   Last TTE/BOB (3/1/24): shows LVEF to 30% and mild RV dysfunction (intra-op impella 5.5 insert)  Osteopenia/osteoporosis prophylaxis: Vitamin D3 and calcium supplements  Peptic/gastric ulcer prophylaxis: Pantoprazole 40 mg daily   CAV Prophylaxis: HOLDING Aspirin 81 mg daily 2/2 thrombocytopenia & HOLDING rosuvastatin 40 mg nightly 2/2 transaminitis     - Unclear cause of acute severe graft dysfunction. Most likely smoldering ACR vs. AMR; however, 2x allograft biopsies on 2/16 & 2/20 remain negative for any significant pathology. Notably, both biopsies taken after rejection therapies implemented which may have reduced areas of graft damage.    - DSAs remain negative; however, patient may have non-HLA antibodies present. 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   - Negative CAV & CAD via LHC on 2/18/24   - Completed methylpred steroid pulse w/ 1g q24 x 3 days (2/16-2/18) and prednisone 10 mg daily   - Thymoglobulin doses: 2/18 & 2/19   - IVIG + PLEX Sessions completed: 2/18, 2/20, 3/7, 3/11, 3/13 - completed   - Graft function slightly worse after transition to impella 5.5 on 3/1. 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  #Impella 5.5 support (3/1/24)  RHC (3/13/24): RA: 16, RV: 43/1, PA: 43/12, PCWP: 23, PA-SAT: 47%, CO: 5.14, CI: 2.64 on P5 of Impella 5.5, milrinone 0.25 mcg/kg/min, hydralazine 100 mg q8, isordil 40 mg q8  Opening SGC #s (3/13): BP 94/71 (79), PAP 42/30 (35), CVP 13, , CO/CI (kyra) 5.46/2.80, SVO2 56% on P5 of Impella 5.5, milrinone 0.25 mcg/kg/min, hydralazine 100 mg q8, isordil 40 mg q8  Daily/ Closing SGC #s (3/16): /86 (97), CVP 20, PAP 35/25 (42), SVR 1115, CO/CI (kyra) 5.5/2.8, SVO2 51% on P7 of Impella 5.5, milrinone 0.25  mcg/kg/min, hydralazine 50 mg q8, isordil 20 mg q8  - Maintain goal MAP 70-90  - Impella P level maintaining at P5, wean per clinical picture / hemodynamics   - Transitioned to Heparin purge for Impella per Dr. Wright (03/19)  - Discontinue milrinone gtt 0.25 mcg/kg/min (03/19)  - C/w Epinephrine gtt for improved blood pressure to increase fluid removal (03/19)  - Discontinued afterload reduction with PO hydralazine 50 mg q8 + PO isosorbide 20 mg q8 2/2 hypotension    - Continue ASA - Currently HELD 2/2 low platelet count  - Continue to hold rosuvastatin 40 mg daily until transaminases normalize - improving daily   - S/p Digoxin 125 mcg PO (3/12, 3/13), digoxin level (3/14): 0.71 - currently discontinued - will readdress for RV protection / HR  - STAT ECHO completed 03/14: LV systolic function is severely decreased with a 25-30% estimated ejection fraction; There is mildly reduced right ventricular systolic function; Moderate mitral valve regurgitation; Moderate to severe tricuspid regurgitation visualized; There is global hypokinesis of the left ventricle with minor regional variations.    #Lactic acidosis  - Slightly elevated lactate level this AM at 2.4  - Increase P level to 5  - Recheck in afternoon     #Advanced therapy evaluation  - Presented to committee 3/12/2024 - concern for end organ failure (possible heart / kidney)   - Not a candidate for transplant at this time per committee discussion 3/12/24  - Discussed in selection committee meeting 03/19 -   (1) likelihood of cardiac recovery s/p graft failure seems less likely as time goes by  (2) she may be a candidate for re-do cardiac transplantation, but she has high risk features (kidney failure, congestion, prior failure of immunosuppression)  (3) per UNOS guidelines she would not be a candidate for combined H/K listing until 6 weeks have passed since kidney failure episode started.      PULM:   #Acute Hypoxic Respiratory Failure 2/2 pulmonary edema  (resolved)  ETT (2/21-2/24)  Remains on RA   - Maintain SpO2 > 92%     GI:   #Nausea  #Constipation   #Diarrhea - resolved   #Emesis (dark blood - clots) - resolved   #Right sided flank pain - improved   - Repeat CT chest/abd/pelvis w/o contrast (3/17): no acute abdomen    - C/w Zofran PRN   - C/w Olanzapine 2.5 mg IM TID PRN   - Resume Patti-Colace PRN / Miralax PRN (no BM x 24 hours)   - Patient had emesis x1 with dark blood in vomit -> GI consult placed 3/17 - signed off (protonix gtt for 3 days, d/tri on 3/20)  - Sent stool for C-Diff -> Negative (03/17)    #Hx of gastric bypass   #Hx of MMF colitis  #Acute transaminitis   - Continue home PPI, calcitriol 0.5 mg daily, multivitamin  - Continue PRN miralax prn & patti-colace   - Trend LFTs daily - improving 03/18     :   #Acute Renal Failure 2/2 to cardiorenal syndrome (on IHD)  Baseline Cr 1.2-1.3 - now aneuric   CVVH stopped 2/27  - RFP daily and PRN  - Tablo (completed 03/15, 03/16, & 03/17) / SLED / CVVH per daily assessment - SLED 03/19 and 3/20 per nephrology    - Holding off dialysis today, trial diuretic challenge Lasix 100 mg x1 and lasix gtt @ 20   - Will do dialysis tomorrow if doesn't respond   - Transplant nephrology following closely, appreciate assistance   - Per Dr. Wright, every day fluid removal - route to be determined by nephrology      ENDO:   #T2DM  Steroid induced hyperglycemia acceptable glycemic control on SSI  - Maintain BG <180 with hypoglycemia protocol  - Continue SSI     #Hypothyroidism  TSH (3/17): 20.74, T4 1.11, T3: 1.4    - Continue synthroid 200 mcg daily   - Endocrine following, appreciate assistance      HEME:   #Acute DVT LIJ and SVT left cephalic vein and right cephalic vein   #Iron deficiency anemia  #Acute blood loss anemia   #Multiple transfusions   #Hemolysis - resolving slowly   UE and LE Venous duplex (3/6/24): right acute occlusive superficial venous thrombosis visualized in the mid cephalic and acute occlusive  superficial venous thrombosis visualized in the distal cephalic veins, left acute non-occlusive deep vein thrombosis visualized in the internal jugular and acute non-occlusive superficial venous thrombosis visualized in the mid cephalic veins   UE and LE Venous duplex (3/12): unchanged from prior  Last type and screen: 3/15  - 03/15: Transfused 1 unit leukocyte reduced PRBC's   - 03/16: Transfused 1 unit leukocyte reduced PRBC's   - Heparin impella purge check ACT Q4 hours   - ASA on hold d/t low platelet count   - Continue SCDs for DVT prophylaxis  - Continue Aranesp every 2 weeks  - Vascular medicine signed off 3/13, will need discussion regarding long term anticoagulation closer to discharge   - Consulted hematology regarding DIC / ALBERT: unlikely per hematology (03/17)  - Per hematology, trend daily T/D-bilirubin, LDH, haptoglobin, reticulocyte count, fibrinogen, PT/PTT/INR, D-dimer.  - [id number] sent per heme recs: 43- the tacrolimus level has been adjusted recently and there may be a tacro-induced TMA.     ID:   #Leukopenia, likely in the setting of IVIG vs infectious process (resolved)  Blood cultures (2/15): NGTD  Afebrile, nontoxic  - Sent infectious workup: blood cultures + respiratory culture - No Growth final report 03/19  - Stopped Broad spectrum abx 03/17: vancomycin + zosyn (3/14-3/17)  - Follow lactate: 03/21: 2.4 -> recheck in afternoon  - Left groin ECMO cannulation site - wound draining - wound care following appreciate recs - cultures sent - No growth   - Diflucan 200 mg x1 for yeast infection concern   - Trend temp q4h     PHYSICAL AND OCCUPATIONAL THERAPY: ordered and following     LINES:  PIVs  RIJ trialysis catheter 3/5 (discussed line in rounds this AM as patient has no other access)   R Axillary Impella 3/1    DVT: SCDs  VAP BUNDLE: N/A  ULCER PPX: PPI - pantoprazole gtt until 03/20 @ 1900   GLYCEMIC CONTROL: SSI  BOWEL CARE: Patti-colace, miralax    INDWELLING CATHETER: NA  NUTRITION: Adult  diet Regular; 1500 mL fluid      EMERGENCY CONTACT: Extended Emergency Contact Information  Primary Emergency Contact: SAHIL DIMAS  Address: [address] [place] [address]  Home Phone: [phone]  Mobile Phone: [phone]  Relation: Mother  FAMILY UPDATE: given at bedside  CODE STATUS: Full Code  DISPO: remain in HFICU    Patient seen and assessed with Dr. Wright   _________________________________________________  Estephania Enriquez, JIMMY-CNP

## 2024-03-21 NOTE — CARE PLAN
Problem: Pain  Goal: Takes deep breaths with improved pain control throughout the shift  Outcome: Progressing  Goal: Turns in bed with improved pain control throughout the shift  Outcome: Progressing     Problem: Skin  Goal: Decreased wound size/increased tissue granulation at next dressing change  Outcome: Progressing  Goal: Participates in plan/prevention/treatment measures  Outcome: Progressing     Problem: Discharge Planning  Goal: Discharge to home or other facility with appropriate resources  Outcome: Progressing     Problem: Chronic Conditions and Co-morbidities  Goal: Patient's chronic conditions and co-morbidity symptoms are monitored and maintained or improved  Outcome: Progressing     Problem: Fall/Injury  Goal: Not fall by end of shift  Outcome: Progressing  Goal: Be free from injury by end of the shift  Outcome: Progressing  Goal: Verbalize understanding of personal risk factors for fall in the hospital  Outcome: Progressing  Goal: Verbalize understanding of risk factor reduction measures to prevent injury from fall in the home  Outcome: Progressing  Goal: Use assistive devices by end of the shift  Outcome: Progressing  Goal: Pace activities to prevent fatigue by end of the shift  Outcome: Progressing     Problem: Pain  Goal: My pain/discomfort is manageable  Outcome: Progressing   The patient's goals for the shift include      The clinical goals for the shift include patient will tolerated HD and remain HDS throughout shift

## 2024-03-21 NOTE — PROGRESS NOTES
Physical Therapy    Physical Therapy Treatment    Patient Name: Charla Bowles  MRN: [id number]  Today's Date: 3/21/2024  Time Calculation  Start Time: 1055  Stop Time: 1135  Time Calculation (min): 40 min       Assessment/Plan   PT Assessment  End of Session Communication: Bedside nurse  End of Session Patient Position: Up in chair, Alarm off, caregiver present  PT Plan  Inpatient/Swing Bed or Outpatient: Inpatient  PT Plan  Treatment/Interventions: Bed mobility, Transfer training, Gait training, Stair training, Balance training, Strengthening, Endurance training, Therapeutic exercise, Therapeutic activity  PT Plan: Skilled PT  PT Frequency: 5 times per week  PT Discharge Recommendations: High intensity level of continued care  PT Recommended Transfer Status: Assist x1  PT - OK to Discharge: Yes      General Visit Information:   PT  Visit  PT Received On: 03/21/24  Response to Previous Treatment: Patient with no complaints from previous session.  General  Family/Caregiver Present: No  Prior to Session Communication: Bedside nurse  Patient Position Received:  (seated EOB, RN present.)  General Comment: Pt agreeable to work with PT today. Verbalizing that she only wants to walk, and declining further exercise or use of bike today.    Subjective   Precautions:  Precautions  Medical Precautions: Cardiac precautions, Fall precautions  Precautions Comment: R axillary impella precautions- no pushing/pulling with R UE, No lifting R UE above shoulder height, no R UE humeral EXT past plane of body. Impella settings: P-5, Flow 2.6-2.7.  IV meds: lasix, epi .03. on telemetry.    Vital Signs:  Vital Signs  Heart Rate:  (; DURING: max ; POST: 124)  SpO2:  (PRE: 95%; POST: 98% . on room air.)  BP:  (PRE: 108/73; POST: 89/71)  MAP (mmHg):  (MAP 78(post session))    Objective   Pain:  Pain Assessment  Pain Assessment: 0-10  Pain Score:  (did not rate, but reporting R knee pain/buckling at  times.)    Cognition:  Cognition  Overall Cognitive Status: Within Functional Limits  Arousal/Alertness: Appropriate responses to stimuli  Orientation Level: Oriented X4  Following Commands: Follows all commands and directions without difficulty  Impulsive: Mildly    Postural Control:  Static Sitting Balance  Static Sitting-Level of Assistance:  (SBA)  Dynamic Sitting Balance  Dynamic Sitting-Balance:  (SBA)  Static Standing Balance  Static Standing-Level of Assistance:  (CGA with FWW)  Dynamic Standing Balance  Dynamic Standing-Balance:  (CGA with FWW)    Activity Tolerance:  Activity Tolerance  Endurance: Tolerates 10 - 20 min exercise with multiple rests  Early Mobility/Exercise Safety Screen: Proceed with mobilization - No exclusion criteria met  Activity Tolerance Comments: Pt reporting no incr in SOB during activity, and slight LE fatigue.  Treatments:  Bed Mobility  Bed Mobility: Yes  Bed Mobility 1  Bed Mobility 1: Supine to sitting  Level of Assistance 1: Minimal verbal cues, Close supervision  Bed Mobility Comments 1:  (line management assist needed)    Ambulation/Gait Training  Ambulation/Gait Training Performed: Yes  Ambulation/Gait Training 1  Surface 1: Level tile  Device 1: Rolling walker  Assistance 1: Contact guard  Quality of Gait 1: Wide base of support (varying gait speed throughout, slightly impulsive at times.)  Comments/Distance (ft) 1: 60 ft, 70 ft, 120 ft (standing rest breaks x 3 2/2 fatigue. vitals stable. HR max 132 bpm.)  Transfers  Transfer: Yes  Transfer 1  Transfer From 1: Sit to  Transfer to 1: Stand  Technique 1: Sit to stand  Transfer Device 1: Walker  Transfer Level of Assistance 1: Minimum assistance (x1)  Trials/Comments 1: Sit <>stand performed x 3 trials during session, range from CGA to Min A required, with use of FWW.  Transfers 2  Transfer From 2: Stand to  Transfer to 2: Sit  Technique 2: Stand to sit  Transfer Device 2: Walker  Transfer Level of Assistance 2: Contact  guard  Trials/Comments 2: cues for sequencing and hand placement for safety. reminded pt to avoid using R UE to push off.    Outcome Measures:  [facility] Basic Mobility  Turning from your back to your side while in a flat bed without using bedrails: None  Moving from lying on your back to sitting on the side of a flat bed without using bedrails: A little  Moving to and from bed to chair (including a wheelchair): A little  Standing up from a chair using your arms (e.g. wheelchair or bedside chair): A little  To walk in hospital room: A little  Climbing 3-5 steps with railing: A lot  Basic Mobility - Total Score: 18    FSS-ICU  Ambulation: Walks >/ or equal to 150 feet with minimal assistance x1  Rolling: Supervision or set-up only  Sitting: Supervision or set-up only  Transfer Sit-to-Stand: Minimal assistance (performs 75% or more of task)  Transfer Supine-to-Sit: Minimal assistance (performs 75% or more of task)  Total Score: 22      ICU Mobility Screen  Early Mobility/Exercise Safety Screen: Proceed with mobilization - No exclusion criteria met  E = Exercise and Early Mobility  Early Mobility/Exercise Safety Screen: Proceed with mobilization - No exclusion criteria met  Current Activity: Ambulating in [place]    Education Documentation  Mobility Training, taught by Sophia Saldivar PT at 3/21/2024 12:00 PM.  Learner: Patient  Readiness: Acceptance  Method: Explanation  Response: Verbalizes Understanding  Comment: safety during mobility    Education Comments  No comments found.             Encounter Problems       Encounter Problems (Active)       Balance       Pt will demonstrate improved sitting/standing static/dynamic balance activities without LOB for increase in safety prior to DC.  (Progressing)       Start:  03/01/24    Expected End:  04/01/24               Mobility       Pt will ambulate 200ft with appropriate form, CGA or less, LRAD, and no LOB for safe DC.  (Met)       Start:  03/01/24    Expected End:  03/15/24     Resolved:  03/18/24    Updated to: Pt will ambulate >500 ft with LRAD and supervision with RPD </= 3/10 and RPE </= 13/20.    Update reason: Goal Met         Pt will tolerate >15 minutes of upright standing activity without seated rest breaks with no changes in vital signs for improved functional mobility.  (Progressing)       Start:  03/01/24    Expected End:  04/01/24            Pt will ascend/descend 3 steps with HR with CGA or less in order to safely access her home upon DC.  (Progressing)       Start:  03/01/24    Expected End:  04/01/24            Pt will ambulate >500 ft with LRAD and supervision with RPD </= 3/10 and RPE </= 13/20. (Progressing)       Start:  03/18/24    Expected End:  04/01/24                   Transfers       Pt will perform bed mobility and sit<>stand transfers with CGA or less and use of LRAD to safety DC.  (Progressing)       Start:  03/01/24    Expected End:  04/01/24

## 2024-03-21 NOTE — PROGRESS NOTES
"Nutrition Follow Up Assessment:   Nutrition Assessment         Follow-up from nutrition visit 3/13.      Nutrition History:  Food and Nutrient History: Recent intake seems to be fair, pt is drinking 1 Ensure Clear per day (240kcal, 8g protein) and eating at least 1 meal per day. Confirmed that patient received Extended Stay menu -- pt stated she is able to find meals to order, but is getting tired of eating hospital food. Pt reports that she has been having stomach issues the last few days.  Vitamin/Herbal Supplement Use: calcitrol, folic acid, MVI with minerals while in house         Anthropometrics:  Height: 154.9 cm (5' 0.98\")   Weight: 94.2 kg (207 lb 10.8 oz)   BMI (Calculated): 39.26  IBW/kg (Dietitian Calculated): 47.7 kg  Percent of IBW: 197 %  Adjusted Body Weight (kg): 59 kg    Weight History:   Date/Time Weight   03/21/24 0615 94.2 kg (207 lb 10.8 oz)   03/20/24 1830 99 kg (218 lb 4.1 oz)   03/20/24 1400 99 kg (218 lb 4.1 oz)   03/19/24 1835 99.6 kg (219 lb 9.3 oz)   03/19/24 0900 100 kg (220 lb 7.4 oz)   03/18/24 0500 101 kg (222 lb 0.1 oz)   03/17/24 0600 106 kg (233 lb 11 oz)   03/16/24 0600 97.2 kg (214 lb 4.6 oz)   03/15/24 0600 98.7 kg (217 lb 9.5 oz)   03/14/24 0400 102 kg (225 lb 1.4 oz)   03/13/24 0600 99.3 kg (218 lb 14.7 oz)   03/11/24 0600 97 kg (213 lb 13.5 oz)   03/10/24 0700 94.5 kg (208 lb 5.4 oz)   03/10/24 0600 94.5 kg (208 lb 5.4 oz)   03/08/24 0900 94.1 kg (207 lb 7.3 oz)     Weight Change %:  Weight History / % Weight Change: Weight down slightly from admission weight (99.6kg on 2/21 --> 94.2 on 3/21). 5% weight loss in 1 month, unclear whether this is fluid related.  Significant Weight Loss: Yes  Interpretation of Weight Loss: 5% in 1 month    Nutrition Focused Physical Exam Findings:  defer: nursing care at time of visit  Subcutaneous Fat Loss:      Muscle Wasting:     Edema:  Edema: +1 trace  Edema Location: RLE, LLE  Physical Findings:  Skin: Positive (L groin wound, surgical " incision)    Nutrition Significant Labs:  CBC Trend:   Results from last 7 days   Lab Units 03/21/24  0609 03/20/24  0607 03/19/24  0605 03/18/24  0315   WBC AUTO x10*3/uL 7.2 7.6 6.3 7.4   RBC AUTO x10*6/uL 2.66* 2.51* 2.49* 2.39*   HEMOGLOBIN g/dL 8.1* 7.7* 7.5* 7.5*   HEMATOCRIT % 24.9* 24.5* 22.4* 22.5*   MCV fL 94 98 90 94   PLATELETS AUTO x10*3/uL 44* 58* 68* 58*    , Liver Function Trend:   Results from last 7 days   Lab Units 03/21/24  0609 03/20/24  0607 03/19/24  0605 03/18/24  0315   ALK PHOS U/L 277* 261* 252* 197*   AST U/L 102* 83* 103* 175*   ALT U/L 59* 52* 53* 70*   BILIRUBIN TOTAL mg/dL 1.8* 1.5* 1.6* 2.2*    , Renal Lab Trend:   Results from last 7 days   Lab Units 03/21/24  0609 03/20/24  0607 03/19/24  0605 03/18/24  0315   POTASSIUM mmol/L 4.2 3.8 4.2 3.9   PHOSPHORUS mg/dL 2.5 3.1 3.6 3.0   SODIUM mmol/L 134* 130* 126* 128*   MAGNESIUM mg/dL 2.11 2.21 2.53* 2.49*   EGFR mL/min/1.73m*2 34* 27* 19* 37*   BUN mg/dL 14 17 26* 13   CREATININE mg/dL 1.98* 2.38* 3.21* 1.82*        Nutrition Specific Medications:  calcitriol, 0.5 mcg, oral, Daily  darbepoetin floyd, 100 mcg, subcutaneous, q14 days  ferrous sulfate (325 mg ferrous sulfate), 65 mg of iron, oral, Daily with breakfast  folic acid, 1 mg, oral, Daily  insulin lispro, 0-10 Units, subcutaneous, Before meals & nightly  multivitamin with minerals, 1 tablet, oral, Daily  predniSONE, 10 mg, oral, Daily  sennosides-docusate sodium, 1 tablet, oral, BID  tacrolimus, 2 mg, oral, q12h TRICIA  traZODone, 50 mg, oral, Nightly    Continuous medications  EPINEPHrine, 0-2 mcg/kg/min (Dosing Weight), Last Rate: 0.03 mcg/kg/min (03/21/24 1130)  furosemide, 20 mg/hr, Last Rate: 20 mg/hr (03/21/24 1130)  heparin Impella Purge 25 units/mL in 500 mL D5W, 10 mL/hr, Last Rate: 10 mL/hr (03/21/24 0615)          I/O:   Last BM Date: 03/19/24; Stool Appearance: Loose (03/19/24 1000)    Dietary Orders (From admission, onward)       Start     Ordered    03/18/24 0725   Adult diet Regular; 1500 mL fluid  Diet effective now        Question Answer Comment   Diet type Regular    Dietary fluid restriction / 24h: 1500 mL fluid        03/18/24 0712    03/13/24 1434  Snacks  Until discontinued        Question:  Deliver with  Answer:    Comment:  please allow patient to order from extended stay menu    03/13/24 1434    03/13/24 1428  Oral nutritional supplements  Until discontinued        Question Answer Comment   Deliver with Breakfast berry flavor; send BID or when pt requests   Deliver with Dinner    Select supplement: Ensure Clear        03/13/24 1428                     Estimated Needs:   Total Energy Estimated Needs (kCal):  (1900)  Method for Estimating Needs: MSJ = 1586kcal x 1.2  Total Protein Estimated Needs (g): 70 g  Method for Estimating Needs: 1.4 g/kg IBW  Total Fluid Estimated Needs (mL):  (per team)           Nutrition Diagnosis   Malnutrition Diagnosis  Patient has Malnutrition Diagnosis: Yes  Diagnosis Status: New  Malnutrition Diagnosis: Moderate malnutrition related to acute disease or injury  As Evidenced by: pt's reported intake likely meeting <75% of estimated needs for at least 7 days, 5% weight loss in 1 month.  Additional Assessment Information: Malnutrition is likely acute on chronic, nutritional status seems to be declining d/t pt's continued low PO intake.    Nutrition Diagnosis  Patient has Nutrition Diagnosis: Yes  Diagnosis Status (1): Ongoing  Nutrition Diagnosis 1: Increased nutrient needs  Related to (1): altered metabolism for healing/recovery from critical illness  As Evidenced by (1): s/p ECMO & CVVH >>  3/06) transitioned to iHD, s/p Impella  Additional Nutrition Diagnosis: Diagnosis 3  Diagnosis Status (2): Ongoing  Nutrition Diagnosis 2: Unintended weight gain  Related to (2): acute on chronic illness  As Evidenced by (2): up 41# since end of 3/29/23  Additional Assessment Information (2): wt now trending down from fluid removal  Diagnosis Status  (3): New  Nutrition Diagnosis 3: Inadequate oral intake  Related to (3): acute events (nose bleed, nausea/vomiting, constipation)  As Evidenced by (3): reduced ability to consume PO past few days  Additional Assessment Information (3): declines ONS       Nutrition Interventions/Recommendations         Nutrition Prescription:  Individualized Nutrition Prescription Provided for : Continue Regular diet for patient.        Nutrition Interventions:   Interventions: Medical food supplement, Meals and snacks, Vitamin supplement therapy  Meals and Snacks: General healthful diet  Goal: goal of >75% of 2 meals per day  Medical Food Supplement: Commercial beverage  Goal: at least 1 Ensure Clear per day (provides 250kcals, 8g protein each)  Goal: Provide appropriate vitamin supplementation to patient with history of gastric bypass. 2 chewable MVI daily, 500mcgs of B12 daily, 500-600mcgs of calcium/vit D daily,         Nutrition Monitoring and Evaluation   Food/Nutrient Related History Monitoring  Monitoring and Evaluation Plan: Energy intake  Energy Intake: Estimated energy intake  Criteria: >75% of 2 meals per day    Body Composition/Growth/Weight History  Monitoring and Evaluation Plan: Weight  Weight: Measured weight  Criteria: pt to be without significant weight changes    Biochemical Data, Medical Tests and Procedures  Monitoring and Evaluation Plan: Electrolyte/renal panel  Criteria: labs WNL            Time Spent/Follow-up Reminder:   Time Spent (min): 60 minutes  Last Date of Nutrition Visit: 03/21/24  Nutrition Follow-Up Needed?: Dietitian to reassess per policy
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
## 2024-03-21 NOTE — PROGRESS NOTES
Occupational Therapy                 Therapy Communication Note    Patient Name: Charla Bowles  MRN: [id number]  Today's Date: 3/21/2024     Discipline: Occupational Therapy    Missed Visit Reason: Missed Visit Reason:  (patient ambulating in hallway with PT; will attempt OT next visit as appropriate)    Missed Time: Attempt    Comment:  July Araujo OTR/L  Inpatient Occupational Therapist   Rehab Office: [phone]
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
## 2024-03-21 NOTE — PROGRESS NOTES
"Charla Bowles is a 34 y.o. female on day 35 of admission presenting with Cardiogenic shock (CMS/HCC).    Subjective   Pt sitting up in the chair, denies SOB, n/v, fatigue. Emotional support provided for pt regarding prolonged hospitalization.    Objective     Physical Exam    Last Recorded Vitals  Blood pressure 87/69, pulse (!) 117, temperature 36.6 °C (97.9 °F), temperature source Temporal, resp. rate 23, height 1.549 m (5' 0.98\"), weight 94.2 kg (207 lb 10.8 oz), SpO2 100 %.  Intake/Output last 3 Shifts:  I/O last 3 completed shifts:  In: 1545.3 (15.5 mL/kg) [P.O.:600; I.V.:945.3 (9.5 mL/kg)]  Out: 5525 (55.5 mL/kg) [Emesis/NG output:25; Other:5500]  Dosing Weight: 99.6 kg     Relevant Results  Scheduled medications  [Held by provider] aspirin, 81 mg, oral, Daily  calcitriol, 0.5 mcg, oral, Daily  darbepoetin floyd, 100 mcg, subcutaneous, q14 days  ferrous sulfate (325 mg ferrous sulfate), 65 mg of iron, oral, Daily with breakfast  folic acid, 1 mg, oral, Daily  furosemide, 100 mg, intravenous, Once  insulin lispro, 0-10 Units, subcutaneous, Before meals & nightly  levothyroxine, 200 mcg, oral, Daily  lidocaine, 1 patch, transdermal, Daily  melatonin, 10 mg, oral, Nightly  multivitamin with minerals, 1 tablet, oral, Daily  mycophenolate, 180 mg, oral, BID  nystatin, 5 mL, Swish & Swallow, q6h  predniSONE, 10 mg, oral, Daily  [Held by provider] rosuvastatin, 40 mg, oral, Nightly  [Held by provider] sirolimus, 3 mg, oral, Daily  [Held by provider] sulfamethoxazole-trimethoprim, 80 mg of trimethoprim, oral, Daily  tacrolimus, 2 mg, oral, q12h TRICIA  traZODone, 50 mg, oral, Nightly  valGANciclovir, 450 mg, oral, q48h      Continuous medications  EPINEPHrine, 0-2 mcg/kg/min (Dosing Weight), Last Rate: 0.03 mcg/kg/min (03/21/24 0800)  furosemide, 20 mg/hr  heparin Impella Purge 25 units/mL in 500 mL D5W, 10 mL/hr, Last Rate: 10 mL/hr (03/21/24 0615)  milrinone, 0.25 mcg/kg/min (Dosing Weight), Last Rate: Stopped " (03/19/24 0900)      PRN medications  PRN medications: acetaminophen, benzocaine-menthol, dextrose, dextrose **OR** glucagon, diphenhydrAMINE, glucagon, guaiFENesin, HYDROmorphone, artificial tears, microfibrllar collagen, mupirocin, OLANZapine, ondansetron, oxyCODONE, polyethylene glycol, sennosides-docusate sodium, sodium chloride, traZODone    Results for orders placed or performed during the hospital encounter of 02/15/24 (from the past 24 hour(s))   POCT GLUCOSE   Result Value Ref Range    POCT Glucose 131 (H) 74 - 99 mg/dL   ACTIVATED CLOTTING TIME LOW   Result Value Ref Range    POCT Activated Clotting Time Low Range 169 89 - 169 sec   POCT GLUCOSE   Result Value Ref Range    POCT Glucose 199 (H) 74 - 99 mg/dL   ACTIVATED CLOTTING TIME LOW   Result Value Ref Range    POCT Activated Clotting Time Low Range 172 (H) 89 - 169 sec   POCT GLUCOSE   Result Value Ref Range    POCT Glucose 129 (H) 74 - 99 mg/dL   ACTIVATED CLOTTING TIME LOW   Result Value Ref Range    POCT Activated Clotting Time Low Range 178 (H) 89 - 169 sec   Coagulation Screen   Result Value Ref Range    Protime 20.7 (H) 9.8 - 12.8 seconds    INR 1.8 (H) 0.9 - 1.1    aPTT 27 27 - 38 seconds   CBC and Auto Differential   Result Value Ref Range    WBC 7.2 4.4 - 11.3 x10*3/uL    nRBC 3.5 (H) 0.0 - 0.0 /100 WBCs    RBC 2.66 (L) 4.00 - 5.20 x10*6/uL    Hemoglobin 8.1 (L) 12.0 - 16.0 g/dL    Hematocrit 24.9 (L) 36.0 - 46.0 %    MCV 94 80 - 100 fL    MCH 30.5 26.0 - 34.0 pg    MCHC 32.5 32.0 - 36.0 g/dL    RDW 21.9 (H) 11.5 - 14.5 %    Platelets 44 (L) 150 - 450 x10*3/uL    Neutrophils % 72.5 40.0 - 80.0 %    Immature Granulocytes %, Automated 2.5 (H) 0.0 - 0.9 %    Lymphocytes % 8.6 13.0 - 44.0 %    Monocytes % 15.9 2.0 - 10.0 %    Eosinophils % 0.4 0.0 - 6.0 %    Basophils % 0.1 0.0 - 2.0 %    Neutrophils Absolute 5.24 1.20 - 7.70 x10*3/uL    Immature Granulocytes Absolute, Automated 0.18 0.00 - 0.70 x10*3/uL    Lymphocytes Absolute 0.62 (L) 1.20 -  4.80 x10*3/uL    Monocytes Absolute 1.15 (H) 0.10 - 1.00 x10*3/uL    Eosinophils Absolute 0.03 0.00 - 0.70 x10*3/uL    Basophils Absolute 0.01 0.00 - 0.10 x10*3/uL   Calcium, Ionized   Result Value Ref Range    POCT Calcium, Ionized 1.04 (L) 1.1 - 1.33 mmol/L   D-dimer, Non VTE   Result Value Ref Range    D-Dimer Non VTE, Quant (ng/mL FEU) 4,072 (H) <=500 ng/mL FEU   Comprehensive metabolic panel   Result Value Ref Range    Glucose 145 (H) 74 - 99 mg/dL    Sodium 134 (L) 136 - 145 mmol/L    Potassium 4.2 3.5 - 5.3 mmol/L    Chloride 98 98 - 107 mmol/L    Bicarbonate 23 21 - 32 mmol/L    Anion Gap 17 10 - 20 mmol/L    Urea Nitrogen 14 6 - 23 mg/dL    Creatinine 1.98 (H) 0.50 - 1.05 mg/dL    eGFR 34 (L) >60 mL/min/1.73m*2    Calcium 8.3 (L) 8.6 - 10.6 mg/dL    Albumin 3.5 3.4 - 5.0 g/dL    Alkaline Phosphatase 277 (H) 33 - 110 U/L    Total Protein 6.3 (L) 6.4 - 8.2 g/dL     (H) 9 - 39 U/L    Bilirubin, Total 1.8 (H) 0.0 - 1.2 mg/dL    ALT 59 (H) 7 - 45 U/L   Fibrinogen   Result Value Ref Range    Fibrinogen 215 200 - 400 mg/dL   Lactate Dehydrogenase   Result Value Ref Range     (H) 84 - 246 U/L   Magnesium   Result Value Ref Range    Magnesium 2.11 1.60 - 2.40 mg/dL   Phosphorus   Result Value Ref Range    Phosphorus 2.5 2.5 - 4.9 mg/dL   Reticulocytes   Result Value Ref Range    Retic % 11.1 (H) 0.5 - 2.0 %    Retic Absolute 0.296 (H) 0.018 - 0.083 x10*6/uL    Reticulocyte Hemoglobin 33 28 - 38 pg    Immature Retic fraction 33.3 (H) <=16.0 %   Lactate   Result Value Ref Range    Lactate 2.3 (H) 0.4 - 2.0 mmol/L   Bilirubin, Direct   Result Value Ref Range    Bilirubin, Direct 0.9 (H) 0.0 - 0.3 mg/dL   Morphology   Result Value Ref Range    RBC Morphology See Below     Polychromasia Mild     Hypochromia Mild     RBC Fragments Few     Target Cells Few     Alex Cells Many    ACTIVATED CLOTTING TIME LOW   Result Value Ref Range    POCT Activated Clotting Time Low Range 113 89 - 169 sec   Green Top   Result  Value Ref Range    Extra Tube Hold for add-ons.    ACTIVATED CLOTTING TIME LOW   Result Value Ref Range    POCT Activated Clotting Time Low Range 144 89 - 169 sec   ACTIVATED CLOTTING TIME LOW   Result Value Ref Range    POCT Activated Clotting Time Low Range 167 89 - 169 sec   Haptoglobin   Result Value Ref Range    Haptoglobin     Lactate   Result Value Ref Range    Lactate 2.4 (H) 0.4 - 2.0 mmol/L   POCT GLUCOSE   Result Value Ref Range    POCT Glucose 146 (H) 74 - 99 mg/dL   BLOOD GAS MIXED VENOUS   Result Value Ref Range    POCT pH, Mixed 7.37 7.33 - 7.43 pH    POCT pCO2, Mixed 39 (L) 41 - 51 mm Hg    POCT pO2, Mixed 39 35 - 45 mm Hg    POCT SO2, Mixed 48 45 - 75 %    POCT Oxy Hemoglobin, Mixed 46.6 45.0 - 75.0 %    POCT Base Excess, Mixed -2.6 (L) -2.0 - 3.0 mmol/L    POCT HCO3 Calculated, Mixed 22.5 22.0 - 26.0 mmol/L    Patient Temperature 37.0 degrees Celsius    FiO2 21 %     *Note: Due to a large number of results and/or encounters for the requested time period, some results have not been displayed. A complete set of results can be found in Results Review.     XR chest 1 view    Result Date: 3/20/2024  Interpreted By:  Sandra Griffith, STUDY: XR CHEST 1 VIEW;  3/20/2024 7:27 am   INDICATION: Signs/Symptoms:Impella device.   COMPARISON: 03/19/2024   ACCESSION NUMBER(S): [id number]   ORDERING CLINICIAN: KONSTANTIN PERKINS   FINDINGS: Patient is status post median sternotomy. Right subclavian approach Impella device again identified in similar position. Right IJ catheter with the tip in the projection superior vena cava. CardioMEMS device projected over the cardiomediastinal silhouette.       CARDIOMEDIASTINAL SILHOUETTE: Cardiomediastinal silhouette is stable in size and configuration.   LUNGS: The lungs are hypoexpanded with crowding of the bronchovascular markings. Lung fields are essentially clear   ABDOMEN: No remarkable upper abdominal findings.   BONES: No acute osseous changes.       1.  No evidence of  acute cardiopulmonary process.       MACRO: None   Signed by: Sandra Griffith 3/20/2024 11:28 AM Dictation workstation:   [id number]    XR chest 1 view    Result Date: 3/19/2024  Interpreted By:  Randy Bardales and Sheng Max STUDY: XR CHEST 1 VIEW;  3/19/2024 8:00 am   INDICATION: Signs/Symptoms:Impella.   COMPARISON: Chest radiograph dated 3/18/2024, 03/17/2024, 03/16/2024 and CT chest abdomen pelvis dated 03/17/2024   ACCESSION NUMBER(S): [id number]   ORDERING CLINICIAN: KONSTANTIN PERKINS   FINDINGS: AP radiograph of the chest     LINES AND DEVICES: Right subclavian approach Impella device project over the left ventricle not significantly changed from prior exam. Right internal jugular approach central venous catheter tip projects over the mid SVC. CardioMEMS device projects over the cardiomediastinal silhouette. Numerous intact median sternotomy wires. Surgical clips project over the right axilla.   CARDIOMEDIASTINAL SILHOUETTE: Stable enlargement the cardiomediastinal silhouette. Aortic knob calcifications are seen.   LUNGS: Unchanged appearance of mild pulmonary edema. Persistent small left and trace right pleural effusion. No focal consolidation or pneumothorax.   ABDOMEN: No remarkable upper abdominal findings.   BONES: No acute osseous abnormality.       1. Right subclavian approach Impella device projects over the expected location of the left ventricle not significant changed from prior exam. 2. Unchanged appearance of mild pulmonary edema. 3. Additional medical devices as detailed above.   I personally reviewed the images/study and I agree with the findings as stated by Dr. Tee Joe. This study was interpreted at [facility], [place], [place].   MACRO: None   Signed by: Randy Bardales 3/19/2024 2:21 PM Dictation workstation:   [id number]    XR chest 1 view    Result Date: 3/18/2024  Interpreted By:  Randy Bardales and Sheng Max STUDY: XR CHEST 1 VIEW;  3/18/2024 7:43  am   INDICATION: Signs/Symptoms:Impella.   COMPARISON: Chest radiograph dated 3/17/2024, 03/16/2024, 03/15/2024 and CT chest abdomen pelvis dated 03/17/2024   ACCESSION NUMBER(S): [id number]   ORDERING CLINICIAN: KONSTANTIN PERKINS   FINDINGS: AP radiograph of the chest     LINES AND DEVICES: Right subclavian approach Impella device project over the left ventricle. Right internal jugular approach central venous catheter tip projects over the mid SVC. CardioMEMS device projects over the cardiomediastinal silhouette. Numerous intact median sternotomy wires. Surgical staples project over the right clavicular region.   CARDIOMEDIASTINAL SILHOUETTE: Stable enlargement of the cardiomediastinal silhouette.   LUNGS: Similar appearance of mild interstitial prominence suggestive of mild pulmonary edema. Persistent small left pleural effusion. No focal consolidation or pneumothorax.   ABDOMEN: No remarkable upper abdominal findings.   BONES: No acute osseous abnormality.       1. Right subclvian approach Impella device projects over the expected location of the left ventricle. 2. Enlargement of the cardiomediastinal silhouette, persistent small left pleural effusion and mild pulmonary edema is compatible with volume overload not significantly changed from prior study     I personally reviewed the images/study and I agree with the findings as stated by Dr. Tee Joe. This study was interpreted at [facility], [place], [place].   MACRO: None   Signed by: Randy Bardales 3/18/2024 12:16 PM Dictation workstation:   [id number]    Electrocardiogram, 12-lead PRN ACS symptoms    Result Date: 3/18/2024  Sinus tachycardia Possible Left atrial enlargement Low voltage QRS RSR' or QR pattern in V1 suggests right ventricular conduction delay Cannot rule out Anterior infarct (cited on or before 14-JUN-2023) Abnormal ECG When compared with ECG of 02-MAR-2024 20:41, RSR' pattern in V1 is now Present Questionable  change in initial forces of Anteroseptal leads    CT chest abdomen pelvis wo IV contrast    Result Date: 3/17/2024  Interpreted By:  Migel Degroot and Barbat Antonio STUDY: CT CHEST ABDOMEN PELVIS WO CONTRAST;  3/17/2024 2:32 pm   INDICATION: Signs/Symptoms:radiating pain in stomach to back.   COMPARISON: CT chest abdomen and pelvis without contrast 03/14/2024.   ACCESSION NUMBER(S): [id number]   ORDERING CLINICIAN: CLARICE JOHNSTON   TECHNIQUE: CT of the chest, abdomen and pelvis was performed. Contiguous axial images were obtained at 3 mm slice thickness through the chest, abdomen and pelvis. Coronal and sagittal reconstructions at 3 mm slice thickness were performed.  No intravenous or oral contrast agents were administered.   FINDINGS: Please note that the study is limited without intravenous contrast.   CHEST:   LUNG/PLEURA/LARGE AIRWAYS: Small bilateral pleural effusions with associated bibasilar atelectasis, similar to prior exam. No focal consolidation or pneumothorax.   VESSELS: No evidence of a aortic hematoma. Unable to assess for additional acute aortic pathology in the setting of a noncontrast examination. Aorta and pulmonary arteries are normal caliber.  Atherosclerotic changes of the aorta.  No coronary artery calcifications are present.Right subclavian approach Impella device with the distal tip terminating at the apex of the left ventricle. Right-sided IJ approach hemodialysis catheter, with the distal tip projecting over the expected region of the mid-svc. Interval removal of [place]-Estevan catheter.   HEART: The heart is mildly enlarged, similar to prior exam. Trace pericardial fluid is noted, similar to prior exam.   MEDIASTINUM AND YANE: No mediastinal, hilar or axillary lymph nodes are present. Esophagus: Within normal limits.   CHEST WALL AND LOWER NECK: Unchanged postsurgical changes consistent with median sternotomy. Similar component of moderate body wall anasarca. The  visualized thyroid gland appears within normal limits.   ABDOMEN:   LIVER: The liver is enlarged measuring 20.5 cm in craniocaudal dimension.   BILE DUCTS: The bile ducts are not dilated.   GALLBLADDER: The gallbladder is not distended. There hyperdense content noted in the gallbladder, likely to represent sludge or vicarious excretion of contrast material from previous exam.   PANCREAS: There is mild haziness of the pancreatic head and duodenal pancreatic groove, improved from the previous exam, and limited in assessment given diffuse mesenteric fat stranding. The remainder of the pancreas appears unremarkable. There is no pancreatic ductal dilatation or peripancreatic fluid collections noted.   SPLEEN: Within normal limits.   ADRENAL GLANDS: Diffuse thickening of the left adrenal gland without focal nodule/mass, similar to prior exam   KIDNEYS AND URETERS: The kidneys have normal size and enhance symmetrically.  No hydroureteronephrosis or nephroureterolithiasis is present.   PELVIS:   BLADDER: The urinary bladder is decompressed, limited for evaluation.   REPRODUCTIVE ORGANS: The uterus is surgically absent. Similar appearance of a 3.3 x 2.9 cm hypodense lesion within the right adnexa, which may represent an ovarian cyst.   BOWEL: Redemonstration of postsurgical changes consistent with gastric sleeve bariatric surgery. The small and large bowel are normal in caliber and demonstrate no wall thickening. The appendix is surgically absent.   VESSELS: The aorta and IVC are within normal limits, within the limitations of a noncontrast examination.   PERITONEUM/RETROPERITONEUM/LYMPH NODES: Mild-to-moderate component of simple fluid attenuating abdominopelvic fluid, predominantly within the pelvis. Diffuse haziness of the mesentery, similar to prior exam. No loculated fluid collection. Within limitations of this noncontrast examination, there is no evidence abdominopelvic lymphadenopathy.   ABDOMINAL WALL: Similar  component of moderate body wall anasarca. Several injection granulomas within the anterior abdominal wall. Fat containing periumbilical hernia. Redemonstration of a 3.5 cm hyperdense collection in the left inguinal subcutaneous tissue, likely a small hematoma.   BONES: No suspicious osseous lesions are present. Degenerative discogenic disease is noted in the lower thoracic and lumbar spine.       1. No evidence of aortic hematoma on noncontrast CT. Unable to assess for addition acute aortic pathology in the setting of a noncontrast examination. 2. Incompletely characterized haziness of the pancreatic head and duodenal pancreatic groove, which are limited in assessment given diffuse mesenteric and retroperitoneal fat stranding. However, recommend correlation with lipase to rule out interstitial pancreatitis or groove pancreatitis. There is no pancreatic ductal dilatation or peripancreatic fluid collections noted. 3. Small bilateral pleural effusions with associated bibasilar atelectasis. Moderate diffuse body wall anasarca. Diffuse mesenteric haziness. Mild-to-moderate simple fluid attenuating abdominopelvic ascites. Mild cardiomegaly. These findings are similar to prior exam. Correlate with patient's volume status. 4. Other findings and medical devices as above.     I personally reviewed the images/study and I agree with the findings as stated by Johnathon Joe MD. This study was interpreted at [facility], [place], [place]   MACRO: None   Signed by: Migel Springer 3/17/2024 6:46 PM Dictation workstation:   [id number]    XR chest 1 view    Result Date: 3/17/2024  Interpreted By:  Angelina Cagle, STUDY: XR CHEST 1 VIEW; 3/17/2024 7:30 am   INDICATION: Signs/Symptoms:impella am rounds.   COMPARISON: Radiograph dated 03/16/2024   ACCESSION NUMBER(S): [id number]   ORDERING CLINICIAN: CLARICE JOHNSTON   FINDINGS: Right IJ central venous catheter is stable. Impella  device is unchanged in location. Pulmonary artery pressure monitoring device is projecting over the left hilar region. Interval removal of [place]-Estevan catheter.   The cardiac silhouette size is slightly enlarged, stable. Status post median sternotomy.   Left lung base and retrocardiac opacity. No edema or pneumothorax.   No acute osseous abnormality.       1. Small left basilar pleural effusion and left basilar atelectasis, unchanged. 2. Medical devices and postsurgical changes as described above.       Signed by: Angelina Narayan 3/17/2024 9:24 AM Dictation workstation:   [id number]    Transthoracic Echo (TTE) Limited    Result Date: 3/16/2024   [facility], [address] [place] [address]                Tel [phone] and Fax [phone] TRANSTHORACIC ECHOCARDIOGRAM REPORT  Patient Name:      MIGUEL DIMAS      Reading Physician:    [id number] Abel Gurrola MD Study Date:        3/15/2024            Ordering Provider:    [id number] ARANZA LOGAN MRN/PID:           [id number]             Fellow: Accession#:        [id number]         Nurse: Date of Birth/Age: 1990 / 34 years  Sonographer:          DESMOND Gender:            F                    Additional Staff: Weight:                                 Admission Status:     Inpatient -                                                               Routine BSA / BMI:         m2 / kg/m2           Encounter#:           [id number]                                         Department Location:  [address] Study Type:    TRANSTHORACIC ECHO (TTE) LIMITED Diagnosis/ICD: Acute systolic (congestive) heart failure (CHF)-I50.21 Indication:    Acute systolic CHF CPT Code:      Echo Limited-78922; Color Doppler-86168  Study Detail: The following Echo studies were performed: 2D and color flow.  PHYSICIAN INTERPRETATION: Left Ventricle: The left ventricular systolic function is severely  decreased, with an estimated ejection fraction of 20-25%. There is global hypokinesis of the left ventricle with minor regional variations. The left ventricular cavity size is mild to moderately dilated. Left ventricular diastolic filling was not assessed. Left Atrium: The left atrium is enlarged. Right Ventricle: The right ventricle is mildly enlarged. There is reduced right ventricular systolic function. Right Atrium: The right atrium is moderately dilated. Aortic Valve: The aortic valve appears structurally normal. There is indeterminate aortic valve regurgitation. Mitral Valve: The mitral valve is normal in structure. There is moderate mitral valve regurgitation. Tricuspid Valve: The tricuspid valve was not well visualized. Tricuspid regurgitation was not assessed. Pulmonic Valve: The pulmonic valve was not assessed. Pulmonic valve regurgitation was not assessed. Pericardium: There is a trivial pericardial effusion. Aorta: The aortic root was not assessed. In comparison to the previous echocardiogram(s): Compared with study from 3/14/2024, no significant change.  LEFT VENTRICULAR ASSIST DEVICE: LVAD: The patient has a(n) Impella LVAD device present. LVAD Comments: Impella tip is 4.2 cm from the AV plane. It is directed posteriorly and interacting with the papillary muscle.  CONCLUSIONS:  1. Left ventricular systolic function is severely decreased with a 20-25% estimated ejection fraction.  2. There is reduced right ventricular systolic function.  3. The left atrium is enlarged.  4. The right atrium is moderately dilated.  5. Moderate mitral valve regurgitation.  6. Compared with study from 3/14/2024, no significant change.  7. There is global hypokinesis of the left ventricle with minor regional variations. QUANTITATIVE DATA SUMMARY:  [id number] Abel Gurrola MD Electronically signed on 3/16/2024 at 3:08:44 PM  ** Final **     XR chest 1 view    Result Date: 3/16/2024  Interpreted By:  Angelina Cagle,  STUDY: XR CHEST 1 VIEW; 3/16/2024 7:36 am   INDICATION: Signs/Symptoms:impella.   COMPARISON: Radiograph dated 03/15/2024   ACCESSION NUMBER(S): [id number]   ORDERING CLINICIAN: CLARICE JOHNSTON   FINDINGS: Lower extremity approach [place]-Estevan catheter is in place with the tip projecting over the main pulmonary artery. Stable Impella device and right IJ central venous catheter.   The cardiac silhouette size is stable. Status post median sternotomy.   Small left basilar pleural effusion and mild interstitial edema, unchanged. No sizable pneumothorax.   No acute osseous abnormality.       1. No significant interval change in a small left pleural effusion and mild interstitial edema. 2. Medical devices as described above       Signed by: Angelina Narayan 3/16/2024 11:29 AM Dictation workstation:   [id number]    XR chest 1 view    Result Date: 3/15/2024  Interpreted By:  Angelina Cagle, STUDY: XR CHEST 1 VIEW; 3/15/2024 8:09 am   INDICATION: Signs/Symptoms:SGC positioning.   COMPARISON: Radiograph dated 03/14/2024   ACCESSION NUMBER(S): [id number]   ORDERING CLINICIAN: JOANNA KEANE   FINDINGS: Lower extremity approach swans Estevan catheter is projecting over the main pulmonary artery. Impella device is stable. Right IJ central venous catheter is in the mid SVC.   Cardiac silhouette size is stable patient is status post median sternotomy.   Interval improvement in pulmonary edema. Small left pleural effusion and left basilar atelectasis. No sizable pneumothorax.   No acute osseous abnormality.       1. Interval improved pulmonary edema plan 2. Small left pleural effusion and left basilar atelectasis. 3. Medical devices and postsurgical changes as described above.       Signed by: Angelina Narayan 3/15/2024 6:57 PM Dictation workstation:   [id number]    CT chest abdomen pelvis wo IV contrast    Result Date: 3/14/2024  Interpreted By:  Chandra Galicia,  and Chidi Huerta STUDY: CT CHEST ABDOMEN  PELVIS WO CONTRAST;  3/14/2024 9:49 am   INDICATION: Signs/Symptoms:Impella placement. 33 y/o  F with Signs/Symptoms:Impella placement.   COMPARISON: CT chest 03/07/2024.   ACCESSION NUMBER(S): [id number]   ORDERING CLINICIAN: ENRIKE WILD   TECHNIQUE: CT of the chest, abdomen and pelvis was performed. Contiguous axial images were obtained at 3 mm slice thickness through the chest, abdomen and pelvis in 2 mm slice thickness through the chest. Coronal and sagittal reconstructions at 3 mm slice thickness were performed. No intravenous or oral contrast agents were administered.   FINDINGS: Please note that the study is limited without intravenous contrast.   CHEST:   LUNG/PLEURA/LARGE AIRWAYS: Low lung volumes bilaterally. Small to moderate bilateral pleural effusions with adjacent atelectasis. No focal consolidation or pneumothorax.   VESSELS: Right IJ central venous catheter with distal tip at the mid SVC. Right subclavian approach Impella device with distal tip at the apex of the left ventricle. Right femoral approach [place]-Estevan catheter with distal tip at the proximal left pulmonary artery. Aorta and pulmonary arteries are normal caliber.  No atherosclerotic changes of the aorta. No significant atherosclerotic changes of the coronary arteries.   HEART: The heart is enlarged, similar to prior exam. No pericardial effusion   MEDIASTINUM AND YANE: No mediastinal, hilar or axillary lymph nodes are present.  The esophagus appears normal.   CHEST WALL AND LOWER NECK: Median sternotomy wires noted. Mild anasarca. The visualized thyroid gland appears within normal limits.   ABDOMEN:   LIVER: The liver is mildly enlarged measuring 22.2 cm in the craniocaudal direction without focal lesions.   BILE DUCTS: The bile ducts are not dilated.   GALLBLADDER: The gallbladder is not distended and without calcified stones.   PANCREAS: The pancreas appears unremarkable.   SPLEEN: Within normal limits.   ADRENAL GLANDS: Diffuse  thickening of the left adrenal gland without focal nodule/mass. The right adrenal gland is not well visualized.   KIDNEYS AND URETERS: The kidneys have normal size.  No hydroureteronephrosis or nephroureterolithiasis is present.   PELVIS:   BLADDER: The urinary bladder is decompressed, limited for evaluation.   REPRODUCTIVE ORGANS: The uterus is surgically absent. There is a 3.2 x 2.9 cm hypodense lesion in the right adnexa which may represent an ovarian cyst.   BOWEL: Postsurgical changes are noted consistent with prior gastric sleeve bariatric surgery.  The small and large bowel are normal in caliber and demonstrate no wall thickening.  The appendix is surgically absent.   VESSELS: The aorta and IVC are within normal limits.   PERITONEUM/RETROPERITONEUM/LYMPH NODES: Diffuse haziness of the mesentery. Moderate amount of layering ascites, predominantly within the pelvis. No loculated fluid collection. No abdominopelvic lymphadenopathy is present.   ABDOMINAL WALL: Moderate diffuse anasarca. Left and right lower quadrant subcutaneous soft tissue nodules, favored to represent injection granulomas (series 2, image 127, 153, and 163).   BONES: No suspicious osseous lesions are identified.       Chest 1.  Right subclavian approach Impella device with distal tip of the apex of the left ventricle. Other medical devices as above. 2. Low lung volumes bilaterally with small-to-moderate bilateral pleural effusions and adjacent atelectasis.   Abdomen-Pelvis 1.  Diffuse haziness of the mesentery, moderate amount of layering ascites within the abdomen/pelvis, and moderate diffuse anasarca, likely secondary to patient's poor cardiac function. Correlate with patient's volume status.   I personally reviewed the images/study and I agree with the findings as stated by Dr. Bradley Murillo. This study was interpreted at [facility], [place], [place].   MACRO: None   Signed by: Chandra Galicia 3/14/2024  3:38 PM Dictation workstation:   [id number]    Transthoracic Echo (TTE) Limited    Result Date: 3/14/2024   [facility], [address] [place] [address]                Tel [phone] and Fax [phone] TRANSTHORACIC ECHOCARDIOGRAM REPORT  Patient Name:      MIGUEL DIMAS      Reading Physician:    [id number] Evelyn Mary MD Study Date:        3/14/2024            Ordering Provider:    [id number] JOANNA KEANE MRN/PID:           [id number]             Fellow:               [id number] Brionna Stiles MD Accession#:        [id number]         Nurse: Date of Birth/Age: 1990 / 34 years  Sonographer:          Raven Arredonod RDCS Gender:            F                    Additional Staff: Height:            152.40 cm            Admit Date:           2/15/2024 Weight:            102.06 kg            Admission Status:     Inpatient -                                                               Routine BSA / BMI:         1.96 m2 / 43.94      Encounter#:           [id number]                    kg/m2                                         [address] Blood Pressure: 114 /86 mmHg Study Type:    TRANSTHORACIC ECHO (TTE) LIMITED Diagnosis/ICD: Cardiogenic shock-R57.0 Indication:    Cardiogenic shock CPT Code:      Echo Limited-90875; Doppler Limited-13450; Color Doppler-61790 Patient History: Pertinent History: Cardiomyopathy, Previous DVT, Palpitations and Dyspnea. Heart                    tx 3/31/22,Heart tx rejection,Cardiogenic                    shock,ESRD,Lupus,Impella 3/1/24. Study Detail: The following Echo studies were performed: 2D, M-Mode, Doppler and               color flow. Technically challenging study due to patient lying in               supine position.  PHYSICIAN INTERPRETATION: Left  Ventricle: The left ventricular systolic function is severely decreased, with an estimated ejection fraction of 25-30%. There is global hypokinesis of the left ventricle with minor regional variations. The left ventricular cavity size is normal. Left ventricular diastolic filling was indeterminate. Left Atrium: The left atrium is consistent with transplant. Right Ventricle: The right ventricle is mildly enlarged. There is mildly reduced right ventricular systolic function. A device is visualized in the right ventricle. Right Atrium: The right atrium is consistent with a transplant. There is a device visualized in the right atrium. Aortic Valve: The aortic valve was not well visualized. There is indeterminate aortic valve regurgitation. Mitral Valve: The mitral valve is normal in structure. There is moderate mitral valve regurgitation which is posteriorly directed. Tricuspid Valve: The tricuspid valve is structurally normal. There is moderate to severe tricuspid regurgitation. The right ventricular systolic pressure is unable to be estimated. Pulmonic Valve: The pulmonic valve is structurally normal. Pulmonic valve regurgitation was not assessed. Pericardium: There is no pericardial effusion noted. Aorta: The aortic root is normal. In comparison to the previous echocardiogram(s): Compared with study from 3/11/2024, the position of the Impella looks similar. There appears to be moderate to severe TR which was not assessed on prior study.  LEFT VENTRICULAR ASSIST DEVICE: LVAD: The patient has a(n) Impella LVAD device present. Outflow Cannula: Visualization of the outflow cannula is technically difficult. LVAD Comments: The inflow cannula is visualized ~ 4.2cm from the aortic valve. The cannula is angled posteriorly within the LV cavity.  CONCLUSIONS:  1. Left ventricular systolic function is severely decreased with a 25-30% estimated ejection fraction.  2. There is mildly reduced right ventricular systolic function.   3. Moderate mitral valve regurgitation.  4. Moderate to severe tricuspid regurgitation visualized.  5. There is global hypokinesis of the left ventricle with minor regional variations. QUANTITATIVE DATA SUMMARY: 2D MEASUREMENTS:                           Normal Ranges: LAs:           4.73 cm    (2.7-4.0cm) IVSd:          1.47 cm    (0.6-1.1cm) LVPWd:         1.27 cm    (0.6-1.1cm) LVIDd:         4.00 cm    (3.9-5.9cm) LVIDs:         3.57 cm LV Mass Index: 102.9 g/m2 LV % FS        10.8 % LV SYSTOLIC FUNCTION BY 2D PLANIMETRY (MOD):                     Normal Ranges: EF-A4C View: 36.1 % (>=55%) EF-A2C View: 45.0 % EF-Biplane:  40.1 % AORTIC VALVE:                        Normal Ranges: LVOT Diameter: 1.88 cm (1.8-2.4cm) TRICUSPID VALVE/RVSP:                             Normal Ranges: Peak TR Velocity: 2.61 m/s RV Syst Pressure: 30.2 mmHg (< 30mmHg)  [id number] Evelyn Mary MD Electronically signed on 3/14/2024 at 2:23:08 PM  ** Final **     XR chest 1 view    Result Date: 3/14/2024  Interpreted By:  Omari Gutierrez, STUDY: XR CHEST 1 VIEW;  3/14/2024 7:20 am   INDICATION: Signs/Symptoms:SGC positioning.   COMPARISON: 03/13/2024   ACCESSION NUMBER(S): [id number]   ORDERING CLINICIAN: JOANNA KEANE   FINDINGS: AP radiograph of the chest was provided.   Impella device entering from the right axillary artery remains in satisfactory position. [place]-Estevan catheter entering via the inferior vena cava is again noted. The tip appears directed toward the right pulmonary artery. Central venous catheter entering via the right jugular vein remains unchanged in position within the superior vena cava above the cavoatrial junction. CardioMEMS device is again noted on the left.   CARDIOMEDIASTINAL SILHOUETTE: Cardiomediastinal silhouette is large but stable in size and configuration.   LUNGS: There is slight recurrence of vascular congestion relative to the previous study. There is no segmental consolidation. The pleural spaces appear  clear.   ABDOMEN: No remarkable upper abdominal findings.   BONES: No acute osseous changes.       1.  Mild recurrence of vascular congestion in the interim. No other significant changes.       MACRO: None   Signed by: Omari Gutierrez 3/14/2024 9:33 AM Dictation workstation:   [id number]    XR chest 1 view    Result Date: 3/14/2024  Interpreted By:  Omari Gutierrez,  and Coco Macias STUDY: XR CHEST 1 VIEW;  3/13/2024 11:33 pm; 3/13/2024 11:57 pm; 3/13/2024 11:59 pm; 3/13/2024 11:43 pm; 3/13/2024 11:56 pm; 3/13/2024 11:58 pm   INDICATION: Signs/Symptoms:SCG placement check; Signs/Symptoms:SGC placement check.   COMPARISON: 03/13/2024 at 10:41 a.m.   ACCESSION NUMBER(S): [id number]; [id number]; [id number]; [id number]; [id number]; [id number]; [id number]   ORDERING CLINICIAN: ENRIKE WILD   FINDINGS: A series of 7 AP radiographs of the chest were performed between 11:31 PM and 11:59 PM. They are apparently obtained for assessment of [place]-Estevan catheter position. The initial image, accession number concluding with -[id number] is time-stamped 11:31 PM.  On the patient history, that image is entered out of order, and is recorded as having been obtained at 11:56 PM. The subsequent images, beginning with accession number terminating -[id number] appear to be in proper time order.   On the initial image obtained at 11:31 PM, the [place]-Estevan catheter that enters via the inferior vena cava is seen with the tip at the bifurcation of the pulmonary artery. Several subsequent images show it extending further distally within the left pulmonary artery. The final 2 images show the tip at the pulmonary artery bifurcation.   Right IJ approach central venous catheter tip projects over lower SVC. CardioMEMS device is again noted on the left as previously described.   Impella device via right axillary artery remains in satisfactory position.     CARDIOMEDIASTINAL SILHOUETTE: Cardiomediastinal silhouette is enlarged but stable in size  and configuration.   LUNGS: No focal consolidation, sizeable pleural effusion or pneumothorax. Since the earlier exam performed at 10:41 AM, there has been improvement in the degree of vascular congestion and edema.   ABDOMEN: No remarkable upper abdominal findings.   BONES: No acute osseous changes.         1. Overall improvement in the appearance of the chest since the earlier exam performed the morning of this date. 2. [place]-Estevan catheter tip ultimately lies at the bifurcation the main pulmonary artery.     I personally reviewed the images/study and I agree with the findings as stated by resident physician Dr. Gilberto Caban . This study was interpreted at [place], [place].   MACRO: None   Signed by: Omari Gutierrez 3/14/2024 9:16 AM Dictation workstation:   [id number]    XR chest 1 view    Result Date: 3/14/2024  Interpreted By:  Omari Gutierrez  and Coco Macias STUDY: XR CHEST 1 VIEW;  3/13/2024 11:33 pm; 3/13/2024 11:57 pm; 3/13/2024 11:59 pm; 3/13/2024 11:43 pm; 3/13/2024 11:56 pm; 3/13/2024 11:58 pm   INDICATION: Signs/Symptoms:SCG placement check; Signs/Symptoms:SGC placement check.   COMPARISON: 03/13/2024 at 10:41 a.m.   ACCESSION NUMBER(S): [id number]; [id number]; [id number]; [id number]; [id number]; [id number]; [id number]   ORDERING CLINICIAN: ENRIKE WILD   FINDINGS: A series of 7 AP radiographs of the chest were performed between 11:31 PM and 11:59 PM. They are apparently obtained for assessment of [place]-Estevan catheter position. The initial image, accession number concluding with -[id number] is time-stamped 11:31 PM.  On the patient history, that image is entered out of order, and is recorded as having been obtained at 11:56 PM. The subsequent images, beginning with accession number terminating -[id number] appear to be in proper time order.   On the initial image obtained at 11:31 PM, the [place]-Estevan catheter that enters via the inferior vena cava  is seen with the tip at the bifurcation of the pulmonary artery. Several subsequent images show it extending further distally within the left pulmonary artery. The final 2 images show the tip at the pulmonary artery bifurcation.   Right IJ approach central venous catheter tip projects over lower SVC. CardioMEMS device is again noted on the left as previously described.   Impella device via right axillary artery remains in satisfactory position.     CARDIOMEDIASTINAL SILHOUETTE: Cardiomediastinal silhouette is enlarged but stable in size and configuration.   LUNGS: No focal consolidation, sizeable pleural effusion or pneumothorax. Since the earlier exam performed at 10:41 AM, there has been improvement in the degree of vascular congestion and edema.   ABDOMEN: No remarkable upper abdominal findings.   BONES: No acute osseous changes.         1. Overall improvement in the appearance of the chest since the earlier exam performed the morning of this date. 2. [place]-Estevan catheter tip ultimately lies at the bifurcation the main pulmonary artery.     I personally reviewed the images/study and I agree with the findings as stated by resident physician Dr. Gilberto Caban . This study was interpreted at [place], [place].   MACRO: None   Signed by: Omari Gutierrez 3/14/2024 9:16 AM Dictation workstation:   [id number]    XR chest 1 view    Result Date: 3/14/2024  Interpreted By:  Omari Gutierrez and Afshari Mirak Sohrab STUDY: XR CHEST 1 VIEW;  3/13/2024 11:33 pm; 3/13/2024 11:57 pm; 3/13/2024 11:59 pm; 3/13/2024 11:43 pm; 3/13/2024 11:56 pm; 3/13/2024 11:58 pm   INDICATION: Signs/Symptoms:SCG placement check; Signs/Symptoms:SGC placement check.   COMPARISON: 03/13/2024 at 10:41 a.m.   ACCESSION NUMBER(S): [id number]; [id number]; [id number]; [id number]; [id number]; [id number]; [id number]   ORDERING CLINICIAN: ENRIKE WILD   FINDINGS: A series of 7 AP radiographs of  the chest were performed between 11:31 PM and 11:59 PM. They are apparently obtained for assessment of [place]-Estevan catheter position. The initial image, accession number concluding with -[id number] is time-stamped 11:31 PM.  On the patient history, that image is entered out of order, and is recorded as having been obtained at 11:56 PM. The subsequent images, beginning with accession number terminating -[id number] appear to be in proper time order.   On the initial image obtained at 11:31 PM, the [place]-Estevan catheter that enters via the inferior vena cava is seen with the tip at the bifurcation of the pulmonary artery. Several subsequent images show it extending further distally within the left pulmonary artery. The final 2 images show the tip at the pulmonary artery bifurcation.   Right IJ approach central venous catheter tip projects over lower SVC. CardioMEMS device is again noted on the left as previously described.   Impella device via right axillary artery remains in satisfactory position.     CARDIOMEDIASTINAL SILHOUETTE: Cardiomediastinal silhouette is enlarged but stable in size and configuration.   LUNGS: No focal consolidation, sizeable pleural effusion or pneumothorax. Since the earlier exam performed at 10:41 AM, there has been improvement in the degree of vascular congestion and edema.   ABDOMEN: No remarkable upper abdominal findings.   BONES: No acute osseous changes.         1. Overall improvement in the appearance of the chest since the earlier exam performed the morning of this date. 2. [place]-Estevan catheter tip ultimately lies at the bifurcation the main pulmonary artery.     I personally reviewed the images/study and I agree with the findings as stated by resident physician Dr. Gilberto Caban . This study was interpreted at [facility], [place], [place].   MACRO: None   Signed by: Omari Gutierrez 3/14/2024 9:16 AM Dictation workstation:   [id number]    XR chest 1  view    Result Date: 3/14/2024  Interpreted By:  Omari Gutierrez  and Coco Caban [facility] STUDY: XR CHEST 1 VIEW;  3/13/2024 11:33 pm; 3/13/2024 11:57 pm; 3/13/2024 11:59 pm; 3/13/2024 11:43 pm; 3/13/2024 11:56 pm; 3/13/2024 11:58 pm   INDICATION: Signs/Symptoms:SCG placement check; Signs/Symptoms:SGC placement check.   COMPARISON: 03/13/2024 at 10:41 a.m.   ACCESSION NUMBER(S): [id number]; [id number]; [id number]; [id number]; [id number]; [id number]; [id number]   ORDERING CLINICIAN: ENRIKE WILD   FINDINGS: A series of 7 AP radiographs of the chest were performed between 11:31 PM and 11:59 PM. They are apparently obtained for assessment of [place]-Estevan catheter position. The initial image, accession number concluding with -[id number] is time-stamped 11:31 PM.  On the patient history, that image is entered out of order, and is recorded as having been obtained at 11:56 PM. The subsequent images, beginning with accession number terminating -[id number] appear to be in proper time order.   On the initial image obtained at 11:31 PM, the [place]-Estevan catheter that enters via the inferior vena cava is seen with the tip at the bifurcation of the pulmonary artery. Several subsequent images show it extending further distally within the left pulmonary artery. The final 2 images show the tip at the pulmonary artery bifurcation.   Right IJ approach central venous catheter tip projects over lower SVC. CardioMEMS device is again noted on the left as previously described.   Impella device via right axillary artery remains in satisfactory position.     CARDIOMEDIASTINAL SILHOUETTE: Cardiomediastinal silhouette is enlarged but stable in size and configuration.   LUNGS: No focal consolidation, sizeable pleural effusion or pneumothorax. Since the earlier exam performed at 10:41 AM, there has been improvement in the degree of vascular congestion and edema.   ABDOMEN: No remarkable upper abdominal findings.   BONES: No acute osseous changes.          1. Overall improvement in the appearance of the chest since the earlier exam performed the morning of this date. 2. [place]-Estevan catheter tip ultimately lies at the bifurcation the main pulmonary artery.     I personally reviewed the images/study and I agree with the findings as stated by resident physician Dr. Gilberto Caban . This study was interpreted at [place], [place].   MACRO: None   Signed by: Omari Gutierrez 3/14/2024 9:16 AM Dictation workstation:   [id number]    XR chest 1 view    Result Date: 3/14/2024  Interpreted By:  Omari Gutierrez,  and Coco Macias STUDY: XR CHEST 1 VIEW;  3/13/2024 11:33 pm; 3/13/2024 11:57 pm; 3/13/2024 11:59 pm; 3/13/2024 11:43 pm; 3/13/2024 11:56 pm; 3/13/2024 11:58 pm   INDICATION: Signs/Symptoms:SCG placement check; Signs/Symptoms:SGC placement check.   COMPARISON: 03/13/2024 at 10:41 a.m.   ACCESSION NUMBER(S): [id number]; [id number]; [id number]; [id number]; [id number]; [id number]; [id number]   ORDERING CLINICIAN: ENRIKE WILD   FINDINGS: A series of 7 AP radiographs of the chest were performed between 11:31 PM and 11:59 PM. They are apparently obtained for assessment of [place]-Estevan catheter position. The initial image, accession number concluding with -[id number] is time-stamped 11:31 PM.  On the patient history, that image is entered out of order, and is recorded as having been obtained at 11:56 PM. The subsequent images, beginning with accession number terminating -[id number] appear to be in proper time order.   On the initial image obtained at 11:31 PM, the [place]-Estevan catheter that enters via the inferior vena cava is seen with the tip at the bifurcation of the pulmonary artery. Several subsequent images show it extending further distally within the left pulmonary artery. The final 2 images show the tip at the pulmonary artery bifurcation.   Right IJ approach central venous catheter tip projects over lower SVC.  CardioMEMS device is again noted on the left as previously described.   Impella device via right axillary artery remains in satisfactory position.     CARDIOMEDIASTINAL SILHOUETTE: Cardiomediastinal silhouette is enlarged but stable in size and configuration.   LUNGS: No focal consolidation, sizeable pleural effusion or pneumothorax. Since the earlier exam performed at 10:41 AM, there has been improvement in the degree of vascular congestion and edema.   ABDOMEN: No remarkable upper abdominal findings.   BONES: No acute osseous changes.         1. Overall improvement in the appearance of the chest since the earlier exam performed the morning of this date. 2. [place]-Estevan catheter tip ultimately lies at the bifurcation the main pulmonary artery.     I personally reviewed the images/study and I agree with the findings as stated by resident physician Dr. Gilberto Caban . This study was interpreted at [place], [place].   MACRO: None   Signed by: Omari Gutierrez 3/14/2024 9:16 AM Dictation workstation:   [id number]    XR chest 1 view    Result Date: 3/14/2024  Interpreted By:  Omari Gutierrez,  and Coco Macias STUDY: XR CHEST 1 VIEW;  3/13/2024 11:33 pm; 3/13/2024 11:57 pm; 3/13/2024 11:59 pm; 3/13/2024 11:43 pm; 3/13/2024 11:56 pm; 3/13/2024 11:58 pm   INDICATION: Signs/Symptoms:SCG placement check; Signs/Symptoms:SGC placement check.   COMPARISON: 03/13/2024 at 10:41 a.m.   ACCESSION NUMBER(S): [id number]; [id number]; [id number]; [id number]; [id number]; [id number]; [id number]   ORDERING CLINICIAN: ENRIKE WILD   FINDINGS: A series of 7 AP radiographs of the chest were performed between 11:31 PM and 11:59 PM. They are apparently obtained for assessment of [place]-Estevan catheter position. The initial image, accession number concluding with -[id number] is time-stamped 11:31 PM.  On the patient history, that image is entered out of order, and is recorded as having  been obtained at 11:56 PM. The subsequent images, beginning with accession number terminating -[id number] appear to be in proper time order.   On the initial image obtained at 11:31 PM, the [place]-Estevan catheter that enters via the inferior vena cava is seen with the tip at the bifurcation of the pulmonary artery. Several subsequent images show it extending further distally within the left pulmonary artery. The final 2 images show the tip at the pulmonary artery bifurcation.   Right IJ approach central venous catheter tip projects over lower SVC. CardioMEMS device is again noted on the left as previously described.   Impella device via right axillary artery remains in satisfactory position.     CARDIOMEDIASTINAL SILHOUETTE: Cardiomediastinal silhouette is enlarged but stable in size and configuration.   LUNGS: No focal consolidation, sizeable pleural effusion or pneumothorax. Since the earlier exam performed at 10:41 AM, there has been improvement in the degree of vascular congestion and edema.   ABDOMEN: No remarkable upper abdominal findings.   BONES: No acute osseous changes.         1. Overall improvement in the appearance of the chest since the earlier exam performed the morning of this date. 2. [place]-Estevan catheter tip ultimately lies at the bifurcation the main pulmonary artery.     I personally reviewed the images/study and I agree with the findings as stated by resident physician Dr. Gilberto Caban . This study was interpreted at [place], [place].   MACRO: None   Signed by: Omari Gutierrez 3/14/2024 9:16 AM Dictation workstation:   [id number]    XR chest 1 view    Result Date: 3/14/2024  Interpreted By:  Omari Gutierrez and Afshari Mirak Sohrab STUDY: XR CHEST 1 VIEW;  3/13/2024 11:33 pm; 3/13/2024 11:57 pm; 3/13/2024 11:59 pm; 3/13/2024 11:43 pm; 3/13/2024 11:56 pm; 3/13/2024 11:58 pm   INDICATION: Signs/Symptoms:SCG placement check; Signs/Symptoms:SGC placement  check.   COMPARISON: 03/13/2024 at 10:41 a.m.   ACCESSION NUMBER(S): [id number]; [id number]; [id number]; [id number]; [id number]; [id number]; [id number]   ORDERING CLINICIAN: ENRIKE WILD   FINDINGS: A series of 7 AP radiographs of the chest were performed between 11:31 PM and 11:59 PM. They are apparently obtained for assessment of [place]-Est[place]an catheter position. The initial image, accession number concluding with [id number] is time-stamped 11:31 PM.  On the patient history, that image is entered out of order, and is recorded as having been obtained at 11:56 PM. The subsequent images, beginning with accession number terminating [id number] appear to be in proper time order.   On the initial image obtained at 11:31 PM, the [place]-Estevan catheter that enters via the inferior vena cava is seen with the tip at the bifurcation of the pulmonary artery. Several subsequent images show it extending further distally within the left pulmonary artery. The final 2 images show the tip at the pulmonary artery bifurcation.   Right IJ approach central venous catheter tip projects over lower SVC. CardioMEMS device is again noted on the left as previously described.   Impella device via right axillary artery remains in satisfactory position.     CARDIOMEDIASTINAL SILHOUETTE: Cardiomediastinal silhouette is enlarged but stable in size and configuration.   LUNGS: No focal consolidation, sizeable pleural effusion or pneumothorax. Since the earlier exam performed at 10:41 AM, there has been improvement in the degree of vascular congestion and edema.   ABDOMEN: No remarkable upper abdominal findings.   BONES: No acute osseous changes.         1. Overall improvement in the appearance of the chest since the earlier exam performed the morning of this date. 2. [place]-Estevan catheter tip ultimately lies at the bifurcation the main pulmonary artery.     I personally reviewed the images/study and I agree with the findings as stated by resident physician  Dr. Gilberto Caban . This study was interpreted at [facility], [place], [place].   MACRO: None   Signed by: Omari Gutierrez 3/14/2024 9:16 AM Dictation workstation:   [id number]    Upper extremity venous duplex bilateral    Result Date: 3/13/2024            [place] [address]   Tel [phone] and Fax [phone]  Vascular Lab Report [facility] US UPPER EXTREMITY VENOUS DUPLEX BILATERAL  Patient Name:     MIGUEL DIMAS      Reading           [id number] Rhianna BASS                Physician: Study Date:       3/12/2024            Ordering          [id number] CLARICE WILL                                        Physician:        VICKIE MRN/PID:          [id number]             Technologist:     Chio Amaro RVT Accession#:       [id number]         Technologist 2: Date of           1990 / 34 years  Encounter#:       [id number] Birth/Age: Gender:           F Admission Status: Inpatient            Location          [facility]                                        Performed:  Diagnosis/ICD: Localized (leg) edema-R60.0 CPT Codes:     33633 Peripheral venous duplex scan for DVT complete  **CRITICAL RESULT** Critical Result: New finding: 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  CONCLUSIONS: Right Upper Venous: No evidence of acute deep vein thrombus visualized in the right upper extremity. There is acute occlusive superficial venous thrombosis visualized in the mid cephalic vein. There are chronic changes visualized in the internal jugular vein. Line and Impella device in subclavian vein. Additional Findings; IV Line. Left Upper Venous: There is acute non-occlusive deep vein thrombosis visualized in the internal jugular vein. Acute superficial vein thrombosis in the mid cephalic vein.  Comparison: Compared  with study from 3/4/2024, there are chronic changes in the right internal jugular vein.  Imaging & Doppler Findings:  Right               Compressible    Thrombus       Flow Internal Jugular      Partial        Chronic     Pulsatile Subclavian                                       Pulsatile Subclavian Proximal                   None Subclavian Mid          Yes           None       Pulsatile Subclavian Distal       Yes           None       Pulsatile Axillary                Yes           None       Pulsatile Brachial                Yes           None Cephalic                 No      Acute occlusive Basilic                 Yes           None  Left                Compress      Thrombus         Flow Internal Jugular    Partial  Acute non-occlusive Pulsatile Subclavian Proximal                 None         Pulsatile Subclavian Mid        Yes           None         Pulsatile Subclavian Distal     Yes           None         Pulsatile Axillary              Yes           None         Pulsatile Brachial              Yes           None Cephalic               No      Acute occlusive Basilic               Yes           None  [id number] Rhianna Dumont MD Electronically signed by [id number] Rhianna Dumont MD on 3/13/2024 at 9:04:03 PM  ** Final **     Lower extremity venous duplex bilateral    Result Date: 3/13/2024            [place] [address]   Tel [phone] and Fax [phone]  Vascular Lab Report [facility] US LOWER EXTREMITY VENOUS DUPLEX BILATERAL  Patient Name:     MIGUEL MONTELONGO JUDIE      Reading           [id number] Rhianna Dumont MD                   [facility]                Physician: Study Date:       3/12/2024            Ordering          [id number] CLARICE WILL                                        Physician:        VICKIE MRN/PID:          [id number]             Technologist:     Karley Vines T Accession#:       [id number]         Technologist 2: Date of           1990 / 34 years  Encounter#:        [id number] Birth/Age: Gender:           F Admission Status: Outpatient           Location          [facility]                                        Performed:  Diagnosis/ICD: Localized (leg) edema-R60.0 CPT Codes:     42383 Peripheral venous duplex scan for DVT complete  CONCLUSIONS: Right Lower Venous: No evidence of acute deep vein thrombus visualized in the right lower extremity. Left Lower Venous: No evidence of acute deep vein thrombus visualized in the left lower extremity. Cystic structure noted in left groin measuring 1.79 cm x 1.08 cm.  Imaging & Doppler Findings:  Right                 Compressible Thrombus        Flow Distal External Iliac     Yes        None       Pulsatile CFV                       Yes        None       Pulsatile PFV                       Yes        None FV Proximal               Yes        None       Pulsatile FV Mid                    Yes        None FV Distal                 Yes        None Popliteal                 Yes        None   Spontaneous/Phasic Peroneal                  Yes        None PTV                       Yes        None  Left                  Compress Thrombus        Flow Distal External Iliac   Yes      None       Pulsatile CFV                     Yes      None       Pulsatile PFV                     Yes      None FV Proximal             Yes      None       Pulsatile FV Mid                  Yes      None FV Distal               Yes      None Popliteal               Yes      None   Spontaneous/Phasic Peroneal                Yes      None PTV                     Yes      None  [id number] Rhianna Dumont MD Electronically signed by [id number] Rhianna Dumont MD on 3/13/2024 at 8:59:50 PM  ** Final **     XR chest 1 view    Result Date: 3/13/2024  Interpreted By:  Omari Gutierrez, STUDY: XR CHEST 1 VIEW;  3/13/2024 7:42 am   INDICATION: Signs/Symptoms:PA catheter - ADHF.   COMPARISON: 03/11/2024   ACCESSION NUMBER(S): [id number]   ORDERING CLINICIAN: KONSTANTIN PERKINS    FINDINGS: AP radiograph of the chest was provided.   Impella device entering via the right axillary artery remains in good position. Central venous catheter entering via the right jugular vein is unchanged. CardioMEMS device is visible on the left as previously noted.   CARDIOMEDIASTINAL SILHOUETTE: Cardiomediastinal silhouette is enlarged but stable in size and configuration.   LUNGS: There is slight prominence of the pulmonary vascularity compared to the previous study that may indicate mild overload. Slight accentuation of the interstitial markings suggest mild edema.   ABDOMEN: No remarkable upper abdominal findings.   BONES: No acute osseous changes.       1.  Findings suggesting mild overload and developing edema.       MACRO: None   Signed by: Omari Gutierrez 3/13/2024 4:51 PM Dictation workstation:   [id number]    Cardiac Catheterization Procedure    Result Date: 3/13/2024  Recommendations:     Cardiac Catheterization Procedure    Result Date: 3/13/2024   [facility], Cath Lab, [address] [place] [address] Cardiovascular Catheterization Report Patient Name:      MIGUEL MONTELONGO JUDIE VILAENS Performing Physician:  [id number] Orellana MD Study Date:        3/13/2024             Verifying Physician:   [id number] Orellana MD MRN/PID:           [id number]              Cardiologist/Co-scrub: Accession#:        [id number]          Ordering Provider:     [id number] JOHNSTON Date of Birth/Age: 1990 / 34 years   Fellow: Gender:            F                     Fellow: Encounter#:        [id number]  Study: Right Heart Cath  Indications: Heart failure and cardiomyopathy. Heart failure.  Procedure Description: After infiltration with 2% Lidocaine, the was cannulated with a modified Seldinger technique. After  infiltration of local anesthetic, the right femoral vein was identified with two-dimensional ultrasound. Under direct ultrasound visualization, the right femoral vein was cannulated with a micropuncture technique. A 9 [place] sheath was placed in the vein. A balloon tipped catheter was advanced through the right heart to record pressures. Cardiac output was calculated via the Shreyas method.  Right Heart Catheterization: A balloon tipped catheter was advanced through the right heart to record pressures. Cardiac output was calculated via the Shreyas method. Elevated left sided filling pressures with normal cardiac output. Elevated ventricular filling pressure. Cardiac output is normal. No evidence of shunt. No pulmonary vascular resistance.  Hemo Personnel: +---------------+---------+ Name           Duty      +---------------+---------+ Sourav Orellana [facility] +---------------+---------+  Hemodynamic Pressures:  +----+----------+---------+-------------+-------------+---+----+-------+-------+ SiteDate Time   Phase  Systolic mmHg  Diastolic  ED MeanA-Wave V-Wave                  Name                    mmHg     mmHmmHg mmHg   mmHg                                                     g                    +----+----------+---------+-------------+-------------+---+----+-------+-------+   AO 3/13/2024     Rest          125           86     97                   9:16:03 AM                                                         +----+----------+---------+-------------+-------------+---+----+-------+-------+   RA 3/13/2024     Rest                               16     23     22     9:32:42 AM                                                         +----+----------+---------+-------------+-------------+---+----+-------+-------+   RV 3/13/2024     Rest           43            1 14                       9:35:09 AM                                                          +----+----------+---------+-------------+-------------+---+----+-------+-------+   PW 3/13/2024     Rest                               23     25     31     9:36:57 AM                                                         +----+----------+---------+-------------+-------------+---+----+-------+-------+   PA 3/13/2024     Rest           43           12     28                   9:37:20 AM                                                         +----+----------+---------+-------------+-------------+---+----+-------+-------+  Oxygen Saturation %: +-----------+----------+------------+ Sample SiteO2 Sat (%)HB (g/100ml) +-----------+----------+------------+          FA        96         7.4 +-----------+----------+------------+          RA        46         7.4 +-----------+----------+------------+          FA        96         7.4 +-----------+----------+------------+          PA        47         7.4 +-----------+----------+------------+  Cardiac Outputs: +---------------+------------------+-------+ LISA CO (l/min)LISA CI (l/min/m2)LISA SV +---------------+------------------+-------+             5.1               2.6   38.6 +---------------+------------------+-------+  Vascular Resistance Calculated Values (Wood Units): +-----+---+----+-------+----+----+---+----+----+----+-------+ PhasePVRPVRIPVR/SVRSVR SVRITPRTPRITVR TVRITPR/TVR +-----+---+----+-------+----+----+---+----+----+----+-------+ 0    1.01.8 0      [id number]       +-----+---+----+-------+----+----+---+----+----+----+-------+  Complications: No in-lab complications observed.  Cardiac Cath Post Procedure Notes: Post Procedure Diagnosis: Heart failure. Blood Loss:               Estimated blood loss during the procedure was 0 mls. Specimens Removed:        Number of specimen(s) removed: none.  Recommendations: Maximize medical  therapy. ____________________________________________________________________________________ CONCLUSIONS:  1. Acute on chronic decompensated systolic heart failure. ICD 10 Codes: Acute systolic (congestive) heart failure (CHF)-I50.21  CPT Codes: Right Heart Cath O2/Cardiac output without biopsy (RHC)-62524  [id number] Sourav Orellana MD Performing Physician Electronically signed by [id number] Sourav Orellana MD on 3/13/2024 at 3:12:03 PM  ** Final **     XR chest 1 view    Result Date: 3/13/2024  Interpreted By:  Omari Gutierrez, STUDY: XR CHEST 1 VIEW;  3/13/2024 10:41 am   INDICATION: Signs/Symptoms:SGC positioning.   COMPARISON: 03/13/2024 at 7:26 a.m.   ACCESSION NUMBER(S): [id number]   ORDERING CLINICIAN: JOANNA KEANE   FINDINGS: AP radiograph of the chest performed at 10:34 a.m. was provided.   A [place]-Estevan catheter has been placed via the inferior vena cava. The tip of the catheter appears to lie within the main pulmonary artery. I am uncertain whether it is directed toward the left or the right pulmonary artery.   Impella device entering via the right axillary artery is again noted and appears in satisfactory position. Right jugular venous catheter tip is unchanged in position. CardioMEMS device is again visible on the left.   CARDIOMEDIASTINAL SILHOUETTE: Cardiomediastinal silhouette is large but stable in size and configuration.   LUNGS: There is increasing prominence of the pulmonary vascularity suggesting increased overload. Accentuation of the interstitial markings consistent with edema also appears to have increased slightly in the interim.   ABDOMEN: No remarkable upper abdominal findings.   BONES: No acute osseous changes.       1.  D[place]-Estevan catheter placement as described above. Increasing vascular congestion and edema.       MACRO: None   Signed by: Omari Gutierrez 3/13/2024 11:16 AM Dictation workstation:   [id number]    CT chest wo IV contrast    Result Date: 3/12/2024  Interpreted By:  Alton Piper,  STUDY: CT CHEST WO IV CONTRAST;  3/7/2024 10:07 am   INDICATION: Signs/Symptoms:heart transplant workup.   COMPARISON: 2 02/2022   ACCESSION NUMBER(S): [id number]   ORDERING CLINICIAN: BEULAH MCGOVERN   TECHNIQUE: Helical data acquisition of the chest was obtained  without IV contrast material.  Images were reformatted in axial, coronal, and sagittal planes.   FINDINGS: LUNGS AND AIRWAYS: The trachea and central airways are patent. No endobronchial lesion.   Evaluation of the lung parenchyma demonstrates left basilar atelectasis and small left-sided pleural effusion. There are no suspicious lung nodules.   MEDIASTINUM AND YANE, LOWER NECK AND AXILLA: The visualized thyroid gland is within normal limits.   Scattered mediastinal lymph nodes are felt to be reactive in nature.   Esophagus appears within normal limits as seen.   HEART AND VESSELS: The thoracic aorta is of normal course and caliber without vascular calcifications. Postoperative changes consistent with heart transplant. The patient is status post right subclavian Impella device placement with tip overlying the left ventricle.   Main pulmonary artery and its branches are normal in caliber.   No coronary artery calcifications are seen. The study is not optimized for evaluation of coronary arteries.   There is global chamber enlargement. Left ventricular Impella device in place.   No evidence of pericardial effusion.   UPPER ABDOMEN: Contrast in nondistended loops of bowel.   CHEST WALL AND OSSEOUS STRUCTURES: There are no suspicious osseous lesions. Multilevel degenerative changes are present. There is right axillary soft tissue edema with axillary subcutaneous emphysema consistent with recent Impella device placement. Right axillary clips in place consistent with recent Impella device placement.   Patient is status post median sternotomy.       1.  Postoperative changes consistent with previous heart transplant, Impella device placement and median  sternotomy. 2. Postoperative changes with right axillary soft tissue/fluid consistent with recent Impella device placement. 3. Mild bibasilar atelectasis with small left-sided effusion.   MACRO: None   Signed by: Alton Piepr 3/12/2024 10:24 AM Dictation workstation:   [id number]    Transthoracic Echo (TTE) Limited    Result Date: 3/11/2024   [facility], [address] [place] [address]                Tel [phone] and Fax [phone] TRANSTHORACIC ECHOCARDIOGRAM REPORT  Patient Name:      MIGUEL DIMAS      Reading Physician:    [id number] Patrica BASS MD Study Date:        3/11/2024            Ordering Provider:    [id number] MERLINE JACKSON                                                               HEAD MRN/PID:           [id number]             Fellow: Accession#:        [id number]         Nurse: Date of Birth/Age: 1990 / 34 years  Sonographer:          Kingsley Morrell                                                               [facility] Gender:            F                    Additional Staff: Height:            154.94 cm            Admit Date:           2/15/2024 Weight:            97.01 kg             Admission Status:     Inpatient -                                                               Routine BSA / BMI:         1.94 m2 / 40.41      Encounter#:           [id number]                    kg/m2                                         Department Location:  [address] Blood Pressure: 96 /7 mmHg Study Type:    TRANSTHORACIC ECHO (TTE) LIMITED Diagnosis/ICD: Cardiogenic shock-R57.0 Indication:    Shock, s/p right heart cath, limited for function & MR CPT Code:      Echo Limited-30372; Echo Complete w Full Doppler-48708; Doppler                Limited-35264 Patient History: Pertinent History: 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 Study Detail: The following Echo  studies were performed: 2D, M-Mode, Doppler and               color flow. Technically challenging study due to body habitus and               poor acoustic windows. Definity used as a contrast agent for               endocardial border definition. Total contrast used for this               procedure was 1.5 mL via IV push.  PHYSICIAN INTERPRETATION: Left Ventricle: The left ventricular systolic function is moderately to severely decreased, with an estimated ejection fraction of 25-30%. There is global hypokinesis of the left ventricle with minor regional variations. The left ventricular cavity size is normal. The left ventricular septal wall thickness is mildly increased. There is mildly increased left ventricular posterior wall thickness. Left ventricular diastolic filling was indeterminate. Left Atrium: The left atrium is mildly dilated. Right Ventricle: The right ventricle is normal in size. There is reduced right ventricular systolic function. Right Atrium: The right atrium is mildly dilated. Aortic Valve: The aortic valve was not well visualized. There is indeterminate aortic valve regurgitation. Mitral Valve: The mitral valve is normal in structure. There is moderate mitral valve regurgitation. Tricuspid Valve: The tricuspid valve is structurally normal. Tricuspid regurgitation was not assessed. Pulmonic Valve: The pulmonic valve is not well visualized. Pulmonic valve regurgitation was not assessed. Pericardium: There is a trivial to small pericardial effusion. Aorta: The aortic root was not well visualized. In comparison to the previous echocardiogram(s): Compared with the prior exam from 3/4/2024 there was already an Impella LVAD in place. The LV systolic function appears similar or slightly less dynamic today. The MR appears to still be moderat. The RVSP was not assessed today.  LEFT VENTRICULAR ASSIST DEVICE: LVAD: The patient has a(n) Impella LVAD device present. LVAD Comments: There is an Impella in  placve which is angled posteriorly within the LV cavity.  CONCLUSIONS:  1. Left ventricular systolic function is moderately to severely decreased with a 25-30% estimated ejection fraction.  2. There is reduced right ventricular systolic function.  3. Moderate mitral valve regurgitation.  4. Compared with the prior exam from 3/4/2024 there was already an Impella LVAD in place. The LV systolic function appears similar or slightly less dynamic today. The MR appears to still be moderat. The RVSP was not assessed today.  5. There is global hypokinesis of the left ventricle with minor regional variations. QUANTITATIVE DATA SUMMARY: 2D MEASUREMENTS:                           Normal Ranges: IVSd:          1.40 cm    (0.6-1.1cm) LVPWd:         1.30 cm    (0.6-1.1cm) LVIDd:         4.40 cm    (3.9-5.9cm) LV Mass Index: 117.0 g/m2 LA VOLUME:                               Normal Ranges: LA Vol A4C:        67.6 ml    (22+/-6mL/m2) LA Vol A2C:        83.8 ml LA Vol BP:         75.8 ml LA Vol Index A4C:  34.8ml/m2 LA Vol Index A2C:  43.1 ml/m2 LA Vol Index BP:   39.0 ml/m2 LA Area A4C:       24.1 cm2 LA Area A2C:       27.0 cm2 LA Major Axis A4C: 7.3 cm LA Major Axis A2C: 7.4 cm LA Volume Index:   39.1 ml/m2 RA VOLUME BY A/L METHOD:                       Normal Ranges: RA Area A4C: 19.0 cm2 LV DIASTOLIC FUNCTION:                        Normal Ranges: MV Peak E:    1.23 m/s (0.7-1.2 m/s) MV e'         0.11 m/s (>8.0) MV lateral e' 0.16 m/s MV medial e'  0.06 m/s E/e' Ratio:   11.18    (<8.0) MITRAL VALVE:                 Normal Ranges: MV DT: 108 msec (150-240msec)  RIGHT VENTRICLE: RV Basal 4.10 cm RV Mid   3.00 cm RV Major 7.3 cm RV s'    0.09 m/s  [id number] Patrica Aguilera MD Electronically signed on 3/11/2024 at 6:17:02 PM  ** Final **     XR chest 1 view    Result Date: 3/11/2024  Interpreted By:  Omari Gutierrez, STUDY: XR CHEST 1 VIEW;  3/11/2024 7:47 am   INDICATION: Signs/Symptoms:daily.   COMPARISON: 03/10/2024   ACCESSION  NUMBER(S): [id number]   ORDERING CLINICIAN: BEULAH MCGOVERN   FINDINGS: AP radiograph of the chest was provided.   Impella device entering via the right axillary artery remains unchanged in satisfactory position. Central venous catheter entering via the right jugular vein is unchanged in position. CardioMEMS device is again visible on the left.   CARDIOMEDIASTINAL SILHOUETTE: Cardiomediastinal silhouette is stable in size and configuration.   LUNGS: Minimal basal atelectasis on the left side is again noted. The lungs show no other areas of opacity. Minimal left pleural fluid collection is seen.   ABDOMEN: No remarkable upper abdominal findings.   BONES: No acute osseous changes.       1.  Essentially stable appearance of the chest.       MACRO: None   Signed by: Omari Gutierrez 3/11/2024 12:17 PM Dictation workstation:   [id number]    Pulmonary function testing    Result Date: 3/11/2024  The FEV1/FVC is normal. The FEV1 is mildly reduced. The FVC is moderately reduced, suggesting restrictive impairment.   However, the presence of restriction should be confirmed by measurement of lung volumes. Conclusion: Spirometry shows no obstruction, however it suggests a possible restrictive ventilatory impairment or non-specific pattern. Recommend obtaining lung volumes to confirm.    XR chest 1 view    Result Date: 3/10/2024  Interpreted By:  Alton Piper, STUDY: XR CHEST 1 VIEW; 3/10/2024 4:14 am   INDICATION: Signs/Symptoms:daily.   COMPARISON: 03/09/2024.   ACCESSION NUMBER(S): [id number]   ORDERING CLINICIAN: BEULAH MCGOVERN   FINDINGS: Right IJ line in place.   CARDIOMEDIASTINAL SILHOUETTE: Patient is status post median sternotomy. Impella device in place. CardioMEMS device overlies the left pulmonary artery.   LUNGS: No focal airspace disease. Minimal left basilar atelectasis.   ABDOMEN: No remarkable upper abdominal findings.   BONES: No acute osseous changes.       1.  Impella device in place. 2. No focal  airspace disease.     Signed by: Alton Piper 3/10/2024 10:55 AM Dictation workstation:   [id number]    XR chest 1 view    Result Date: 3/9/2024  Interpreted By:  Alton Piper, STUDY: XR CHEST 1 VIEW; 3/9/2024 8:37 am   INDICATION: Signs/Symptoms:daily.   COMPARISON: 03/08/2024.   ACCESSION NUMBER(S): [id number]   ORDERING CLINICIAN: BEULAH MCGOVERN   FINDINGS:     CARDIOMEDIASTINAL SILHOUETTE: Patient is status post median sternotomy. Impella device in place. Cardiomegaly versus pericardial effusion.   LUNGS: Lungs are clear of focal airspace disease or edema. No pneumothorax.   ABDOMEN: No remarkable upper abdominal findings.   BONES: No acute osseous changes.       1.  Impella device in place. No focal airspace disease.     Signed by: Alton Piper 3/9/2024 9:32 AM Dictation workstation:   [id number]    XR chest 1 view    Result Date: 3/9/2024  Interpreted By:  Alton Piper, STUDY: XR CHEST 1 VIEW; 3/8/2024 4:09 am   INDICATION: Signs/Symptoms:daily.   COMPARISON: 03/07/2024.   ACCESSION NUMBER(S): [id number]   ORDERING CLINICIAN: BEULAH MCGOVERN   FINDINGS:     CARDIOMEDIASTINAL SILHOUETTE: Patient is status post median sternotomy Impella in place overlying the left ventricle. Right IJ catheter overlies the SVC. CardioMEMS device overlies the left pulmonary artery.   LUNGS: Lungs are clear of focal airspace disease or edema. No pneumothorax.   ABDOMEN: No remarkable upper abdominal findings.   BONES: No acute osseous changes.       1.  Life-support systems in satisfactory position. No focal airspace disease.     Signed by: Alton Piper 3/9/2024 9:31 AM Dictation workstation:   [id number]    Vascular US Ankle Brachial Index (KATHIE) Without Exercise    Result Date: 3/8/2024            [place] [address]   Tel [phone] and Fax [phone]  Vascular Lab Report [facility] US ANKLE BRACHIAL INDEX (KATHIE) WITHOUT EXERCISE  Patient Name:       MIGUEL BASS Reading Physician:  [id number] Robert Santiago MD, RPVI Study Date:        3/8/2024              Ordering Physician: [id number] BEULAH MCGOVERN MRN/PID:           [id number]              Technologist:       Kati Wilde                                                              RVT Accession#:        [id number]          Technologist 2: Date of Birth/Age: 1990 / 34 years   Encounter#:         [id number] Gender:            F Admission Status:  Inpatient             Location Performed: [facility]  Diagnosis/ICD: Peripheral vascular disease, unspecified-I73.9 Indication:    Transplant cardiomyopthy CPT Codes:     08689 Peripheral artery KATHIE Only  Patient History Cardiac assist device.  CONCLUSIONS: Right Lower PVR: No evidence of arterial occlusive disease in the right lower extremity at rest. Normal digital perfusion noted. Triphasic flow is noted in the right common femoral artery, right posterior tibial artery and right dorsalis pedis artery. Left Lower PVR: No evidence of arterial occlusive disease in the left lower extremity at rest. Normal digital perfusion noted. Triphasic flow is noted in the left posterior tibial artery and left dorsalis pedis artery. Right groin image not saved due to archiving error, was triphasic.  Comparison: Compared with study from 2/3/2022, no significant change.  Imaging & Doppler Findings:  RIGHT Lower PVR                Pressures Ratios Right Posterior Tibial (Ankle) 110 mmHg  1.02 Right Dorsalis Pedis (Ankle)   108 mmHg  1.00 Right Digit (Great Toe)        105 mmHg  0.97   LEFT Lower PVR                Pressures Ratios Left Posterior Tibial (Ankle) 98 mmHg   0.91 Left Dorsalis Pedis (Ankle)   97 mmHg   0.90 Left Digit (Great Toe)        87 mmHg   0.81                      Right     Left Brachial Pressure 107 mmHg 108 mmHg   [id number] Robert Santiago MD, ALEXSANDER Electronically signed by [id number] Robert Santiago MD, ALEXSANDER on 3/8/2024 at 11:44:19 AM  ** Final **     Vascular US carotid artery duplex bilateral    Result Date: 3/7/2024            [place] [address]   Tel [phone] and Fax [phone]  Vascular Lab Report [facility] US CAROTID ARTERY DUPLEX BILATERAL  Patient Name:      MIGUEL BASS Reading Physician:  [id number] Robert Santiago MD, ALEXSANDER Study Date:        3/7/2024              Ordering Physician: [id number] BEULAH MCGOVERN MRN/PID:           [id number]              Technologist:       CT Accession#:        [id number]          Technologist 2: Date of Birth/Age: 1990 / 34 years   Encounter#:         [id number] Gender:            F Admission Status:  Inpatient             Location Performed: [facility]  Diagnosis/ICD: Encounter for other preprocedural examination-Z01.818 CPT Codes:     20626 Cerebrovascular Carotid Duplex scan complete  CONCLUSIONS: Left Carotid: Findings are consistent with less than 50% stenosis of the left proximal internal carotid artery. Left external carotid artery appears patent with no evidence of stenosis. The left vertebral artery is patent with antegrade flow. No evidence of hemodynamically significant stenosis in the left subclavian artery.  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 Line on right side of neck. Unable to visualize right side vessles. Patient on cardiac device , irregular heart rhythm. Patient is on Cardiac device, LVAD? Velocity criteria for carotid stenosis might be equivocal inLVAD patients  Imaging & Doppler Findings: Left Plaque Morph: The proximal left internal carotid artery  demonstrates homogenous and irregular plaque. The distal left common carotid artery demonstrates smooth and irregular plaque.  Right                 Left  PSV  EDV              PSV     EDV             CCA P    57 cm/s             CCA D    51 cm/s             ICA P    51 cm/s 27 cm/s             ICA M    60 cm/s 32 cm/s             ICA D    56 cm/s 24 cm/s              ECA     39 cm/s           Vertebral  20 cm/s 16 cm/s           Subclavian 31 cm/s               Left ICA/CCA Ratio 1.0   [id number] Santiago MD, ALEXSANDER Electronically signed by [id number] Aburto MD on 3/7/2024 at 9:33:19 PM  ** Final **     Vascular US aorta iliac duplex limited    Result Date: 3/7/2024            [place] [address]   Tel [phone] and Fax [phone]  Vascular Lab Report [facility] US AORTA ILIAC DUPLEX LIMITED  Patient Name:      MIGUEL BASS Reading Physician:  [id number] ALEXSANDER Silverio MD Study Date:        3/7/2024              Ordering Physician: [id number] MCGOVERN MRN/PID:           [id number]              Technologist:       Chio Amaro T Accession#:        [id number]          Technologist 2: Date of Birth/Age: 1990 / 34 years   Encounter#:         [id number] Gender:            F Admission Status:  Inpatient             Location Performed: [facility]  Diagnosis/ICD: Encounter for preprocedural cardiovascular examination-Z01.810 CPT Codes:     12786 Duplex Aorta/IVC/Iliac/Bypass Graft  CONCLUSIONS: Aorta/Common Iliac Arteries/IVC: The abdominal aorta and bilateral common iliac arteries demonstrate no evidence of aneurysm. There is smooth homogeneous plaque visualized proximally within the right common iliac artery. There is no evidence of hemodynamically  significant stenosis in the right common iliac artery. There is smooth homogeneous plaque within the left common iliac artery. No evidence of hemodynamically significant stenosis in the left common iliac artery.  Additional Findings: Lines, dressings, and colostomy bag on abdomen. Limited access to abdomen. Patient on cardiac device. Irregular heart rhythm.  Imaging & Doppler Findings:   AORTA     AP    Lateral    PSV Proximal 2.09 cm 2.09 cm 58.4 cm/s   Mid    1.96 cm 1.96 cm 63.6 cm/s  Distal  1.61 cm 1.66 cm 58.4 cm/s    RIGHT       AP    Lateral    PSV RUTHIE Proximal 1.11 cm 1.02 cm 48.80 cm/s     LEFT       AP    Lateral    PSV RUTHIE Proximal 1.13 cm 1.21 cm 42.70 cm/s  [id number] ALEXSANDER Silverio MD Electronically signed by [id number] ALEXSANDER Silveiro MD on 3/7/2024 at 5:00:01 PM  ** Final **     US abdomen    Result Date: 3/7/2024  Interpreted By:  Ash Ricks and Stephens Katherine STUDY: US ABDOMEN;  3/7/2024 4:09 pm   INDICATION: Signs/Symptoms:heart transplant workup.   COMPARISON: Renal ultrasound 02/19/2024   ACCESSION NUMBER(S): [id number]   ORDERING CLINICIAN: BEULAH MCGOVERN   TECHNIQUE: Multiple images of the abdomen were obtained.   FINDINGS: LIVER: The liver measures 16.1 cm and is grossly unremarkable and free of any focal lesions.   GALLBLADDER: The gallbladder is nondistended, and demonstrates sludge. No wall thickening or surrounding fluid. The gallbladder wall thickness is 0.2 cm. Sonographic Morrell's sign is negative.   BILE DUCTS: No evidence of intra or extrahepatic biliary dilatation is identified; the common bile duct measures 0.5 cm.   PANCREAS: The pancreas is poorly visualized due to overlying bowel gas.   RIGHT KIDNEY: The right kidney measures 10.3 cm in length. The renal cortical echogenicity and thickness are within normal limit.  No hydronephrosis or renal calculi are seen.   LEFT KIDNEY: The left kidney measures 10.2 cm in length. The renal cortical echogenicity and  thickness are within normal limits. No hydronephrosis or renal calculi are seen.   SPLEEN: The spleen measures 7.7 cm and is grossly unremarkable.   URINARY BLADDER: The urinary bladder is within normal limits.   PERITONEUM: There is no free or loculated fluid seen in the abdomen.   ABDOMINAL AORTA AND IVC: The visualized portions of the aorta and IVC are unremarkable.       Biliary sludge without evidence of acute cholecystitis. Otherwise unremarkable abdominal ultrasound.   I personally reviewed the images/study and I agree with Charity Ross DO's (radiology resident) findings as stated. This study was interpreted at [place], [place].   MACRO: None   Signed by: Ash Ricks 3/7/2024 4:37 PM Dictation workstation:   [id number]    Electrocardiogram, 12-lead PRN ACS symptoms    Result Date: 3/7/2024  Sinus tachycardia Low voltage QRS Possible Anteroseptal infarct Possible Lateral infarct Abnormal ECG Confirmed by Kei Lozano ([id number]) on 3/7/2024 3:01:50 PM    ECG 12 lead    Result Date: 3/7/2024   Poor data quality, interpretation may be adversely affected Sinus tachycardia with occasional Premature ventricular complexes Low voltage QRS Anterior infarct Abnormal ECG Confirmed by Kei Lozano ([id number]) on 3/7/2024 3:01:17 PM    Lower extremity venous duplex bilateral    Result Date: 3/7/2024            [place] [address]   Tel [phone] and Fax [phone]  [facility] LOWER EXTREMITY VENOUS DUPLEX BILATERAL  Patient Name:     MIGUEL DIMAS      Reading           [id number] Carolina BASS                Physician:        MD Study Date:       3/6/2024             Ordering          [id number] ODILON RAYO                                        Physician:        GINA MRN/PID:          [id number]             Technologist:     Sue Hammond S Accession#:       [id number]          Technologist 2: Date of           1990 / 34 years  Encounter#:       [id number] Birth/Age: Gender:           F Admission Status: Inpatient            Location          [facility]                                        Performed:  Diagnosis/ICD: Generalized edema (anasarca)-R60.1 CPT Codes:     64991 Peripheral venous duplex scan for DVT complete  Patient History PE. Heart transplant patient and now on list for second heart                 transplant.                 Patient has impella                 Recent ECMO.  CONCLUSIONS: Right Lower Venous: No evidence of acute deep vein thrombus visualized in the right lower extremity. Left Lower Venous: No evidence of acute deep vein thrombus visualized in the left lower extremity. Cannot rule out thrombus in non-visualized common femoral and iliac veins due to dressings. Recent ECMO left groin. Unable to visualize left distal external iliac vein and left CFV.  Additional Findings: Left FV- pop vein continous waveforms maybe suggestive of more proximal occlusion or compression. Technically difficult exam due impella and recent ECMO.  Imaging & Doppler Findings:  Right                 Compressible Thrombus        Flow Distal External Iliac     Yes        None   Spontaneous/Phasic CFV                       Yes        None   Spontaneous/Phasic PFV                       Yes        None FV Proximal               Yes        None   Spontaneous/Phasic FV Mid                    Yes        None FV Distal                 Yes        None Popliteal                 Yes        None   Spontaneous/Phasic Peroneal                  Yes        None PTV                       Yes        None  Left        Compress Thrombus    Flow PFV           Yes      None FV Proximal   Yes      None   Continuous FV Mid        Yes      None FV Distal     Yes      None Popliteal     Yes      None   Continuous Peroneal      Yes      None PTV           Yes      None  [id number] Carolina Benítez MD  Electronically signed by [id number] Carolina Benítez MD on 3/7/2024 at 9:33:26 AM  ** Final **     XR chest 1 view    Result Date: 3/7/2024  Interpreted By:  Raman Roa, STUDY: XR CHEST 1 VIEW;  3/7/2024 3:49 am   INDICATION: Signs/Symptoms:daily.   COMPARISON: Chest radiograph dated 3/6/2024   ACCESSION NUMBER(S): [id number]   ORDERING CLINICIAN: BEULAH MCGOVERN   FINDINGS: AP radiograph of the chest   The right internal jugular catheter is in satisfactory position. The Impella device is noted unchanged in position. Sternal sutures are present. The heart is mildly enlarged. Pulmonary aeration is similar previous study.       1. No acute cardiopulmonary pathology       Signed by: Raman Roa 3/7/2024 9:27 AM Dictation workstation:   [id number]    Upper extremity venous duplex bilateral    Result Date: 3/6/2024            [place] [address]   Tel [phone] and Fax [phone]  Vascular Lab Report [facility] UPPER EXTREMITY VENOUS DUPLEX BILATERAL  Patient Name:     MIGUEL DIMAS      Reading           [id number] Carolina BASS                Physician:        MD Study Date:       3/6/2024             Ordering          [id number] ODILON RAYO                                        Physician:        GINA MRN/PID:          [id number]             Technologist:     Sue FUENTES Accession#:       [id number]         Technologist 2: Date of           1990 / 34 years  Encounter#:       [id number] Birth/Age: Gender:           F Admission Status: Inpatient            Location          [facility]                                        Performed:  Diagnosis/ICD: Pain in right arm-M79.601; Pain in left arm-M79.602 Indication:    Limb pain. CPT Codes:     11361 Peripheral venous duplex scan for DVT complete  Patient History CAD and PE. Patient had heart transplant few years ago now heart                 is not working back on list foe second  heart transplant.                 Recent ECMO left groin.  **CRITICAL RESULT** Critical Result: Acute DVT L IJ vein and SVT left cephalic vein and right cephalic vein Notification called to Carlee Marina on 3/6/2024 at 3:30:53 PM by Sue Hammond.  CONCLUSIONS: Right Upper Venous: There is acute occlusive superficial venous thrombosis visualized in the mid cephalic and acute occlusive superficial venous thrombosis visualized in the distal cephalic veins. Axillary vein compression images were not saved to review. The remainder of veins in right arm are negative for deep vein thrombus. Cannot rule out thrombus of non-compressible proximal subclavian vein due to Impella. Cannot rule out thrombus of non-visualized internal jugular, distal subclavian and mid subclavian veins due to line and Impella. Left Upper Venous: There is acute non-occlusive deep vein thrombosis visualized in the internal jugular and acute non-occlusive superficial venous thrombosis visualized in the mid cephalic veins. The remainder of veins in left arm are negative for deep vein thrombus.  Additional Findings: Technically difficut exam due to impella and lines. Somehow continuos flow is noted which maybe suggestive of more proximal occlusion or compression.  Imaging & Doppler Findings:  Right               Compressible    Thrombus            Flow Subclavian                            None       Spontaneous/Phasic Subclavian Proximal                   None       Spontaneous/Phasic Subclavian Distal                     None Axillary                Yes           None       Spontaneous/Phasic Brachial                Yes           None Cephalic                 No      Acute occlusive Basilic                 Yes           None  Left                Compress      Thrombus              Flow Internal Jugular    Partial  Acute non-occlusive Spontaneous/Phasic Subclavian            Yes           None         Spontaneous/Phasic Subclavian Proximal   Yes            None         Spontaneous/Phasic Subclavian Mid        Yes           None         Spontaneous/Phasic Subclavian Distal     Yes           None         Spontaneous/Phasic Axillary              Yes           None         Spontaneous/Phasic Brachial              Yes           None Cephalic            Partial  Acute non-occlusive     Continuous Basilic               Yes           None  [id number] Carolina Benítez MD Electronically signed by [id number] Carolina Benítez MD on 3/6/2024 at 4:20:11 PM  ** Final **     XR chest 1 view    Result Date: 3/6/2024  Interpreted By:  Raman Roa, STUDY: XR CHEST 1 VIEW;  3/6/2024 3:40 am   INDICATION: Signs/Symptoms:daily.   COMPARISON: Chest radiograph dated 3/5/2024   ACCESSION NUMBER(S): [id number]   ORDERING CLINICIAN: BEULAH MCGOVERN   FINDINGS: AP radiograph of the chest   Limitations: Decreased lung volumes. Patient rotation to the left.   Right internal jugular central venous catheter with distal tip overlying the expected location of the mid superior vena cava. Impella device overlying the left ventricle. Postsurgical changes from median sternotomy. Surgical clips project over the right axilla/upper chest.   Coarse interstitial lung markings are noted bilaterally. Ground-glass opacity within the retrocardiac left lower lobe is noted. The heart is mildly enlarged.       1. Coarse interstitial lung markings bilaterally 2. Small left pleural effusion and subsegmental atelectasis       Signed by: Raman Roa 3/6/2024 8:55 AM Dictation workstation:   [id number]    XR chest 1 view    Result Date: 3/5/2024  Interpreted By:  Raman Roa and Ogievich Taessa STUDY: XR CHEST 1 VIEW;  3/5/2024 11:28 am   INDICATION: Signs/Symptoms:Dialysis line.   COMPARISON: Chest x-ray 03/05/2021-03/01/2024 CT 02/02/2022   ACCESSION NUMBER(S): [id number]   ORDERING CLINICIAN: BEULAH MCGOVERN   FINDINGS: AP semi-erect radiograph of the chest   LINES/TUBES/DEVICES: Interval placement of right  internal jugular central venous catheter with distal tip overlying the expected location of the mid superior vena cava. Impella device overlying the left ventricle. Postsurgical changes from median sternotomy. Surgical clips project over the right axilla/upper chest.   CARDIOMEDIASTINAL SILHOUETTE: The cardiomediastinal silhouette is stable in size and configuration.   LUNGS: No acute pulmonary infiltrates or consolidations are noted. The left ventricle obscures the retrocardiac left lower lobe. Vascular clips overlie the right upper thorax and axilla.   ABDOMEN: No remarkable upper abdominal findings.   BONES: No acute osseous abnormality.       1. No acute cardiopulmonary pathology similar to previous study 2. Medical devices as detailed above   I personally reviewed the images/study and I agree with the findings as stated by Heather Lopez DO, PGY-2. This study was interpreted at [facility], [place], [place].   Signed by: Raman Roa 3/5/2024 1:46 PM Dictation workstation:   [id number]    XR chest 1 view    Result Date: 3/5/2024  Interpreted By:  Raman Roa and Ogievich Taessa STUDY: XR CHEST 1 VIEW;  3/5/2024 8:30 am   INDICATION: Signs/Symptoms:morning cxr. Per EMR: Day 19 of admission presenting with cardiogenic shock.   COMPARISON: Chest x-ray 03/04/2024-02/29/2024 CT chest 02/02/2022   ACCESSION NUMBER(S): [id number]   ORDERING CLINICIAN: BEULAH MCGOVERN   FINDINGS: AP semi-erect radiograph of the chest   LINES/TUBES/DEVICES: Postsurgical changes from median sternotomy. Surgical clips project over the right axilla. Impella device overlying the left ventricle. Right IJ introducer is noted with distal tip in the superior vena cava.   CARDIOMEDIASTINAL SILHOUETTE: The cardiomediastinal silhouette is stable in size and configuration.   LUNGS: Improvement in lung volume and aeration bilaterally. Persistent but improved interstitial opacities. Bibasilar subsegmental  atelectasis. Blunting of the left costophrenic angle may be due to enlarged cardiac silhouette and/or pleural effusion. No evidence of pneumothorax.   ABDOMEN: No remarkable upper abdominal findings.   BONES: No acute osseous abnormality.       1. Improved lung volumes, aeration, and interstitial opacities bilaterally. 2. Persistent bibasilar atelectasis. 3. Possible small left pleural effusion.   I personally reviewed the images/study and I agree with the findings as stated by Heather Lopez DO, PGY-2. This study was interpreted at [place], [place].   Signed by: Raman Roa 3/5/2024 10:20 AM Dictation workstation:   [id number]    Transthoracic Echo (TTE) Limited    Result Date: 3/4/2024   [facility], [address] [place] [address]                Tel [phone] and Fax [phone] TRANSTHORACIC ECHOCARDIOGRAM REPORT  Patient Name:      MIGUEL DIMAS      Reading Physician:    [id number] Param BASS MD Study Date:        3/4/2024             Ordering Provider:    [id number] ELENA QUINTERO MRN/PID:           [id number]             Fellow: Accession#:        [id number]         Nurse: Date of Birth/Age: 1990 / 33 years  Sonographer:          Adryan Meredith RDCS Gender:            F                    Additional Staff: Height:            154.94 cm            Admit Date:           2/15/2024 Weight:            90.27 kg             Admission Status:     Inpatient -                                                               Routine BSA / BMI:         1.89 m2 / 37.60      Encounter#:           [id number]                    kg/m2                                         Department Location:  [facility] Blood Pressure: 127 /84 mmHg Study Type:    TRANSTHORACIC  ECHO (TTE) LIMITED Diagnosis/ICD: Cardiogenic shock-R57.0 Indication:    shock CPT Code:      Echo Limited-01826; Doppler Limited-94977; Color Doppler-14988 Patient History: Pertinent History: 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 Study Detail: The following Echo studies were performed: 2D, M-Mode, Doppler and               color flow. Technically challenging study due to body habitus,               patient lying in supine position, poor acoustic windows, prominent               lung artifact and postoperative dressings. Definity used as a               contrast agent for endocardial border definition. Total contrast               used for this procedure was 2.0 mL via IV push.  PHYSICIAN INTERPRETATION: Left Ventricle: The left ventricular systolic function is moderately to severely decreased, with an estimated ejection fraction of 30-35%. There is global hypokinesis of the left ventricle with minor regional variations. The left ventricular cavity size is normal. Left ventricular diastolic filling was not assessed. Left Atrium: The left atrium is consistent with transplant. Right Ventricle: The right ventricle is mildly enlarged. There is reduced right ventricular systolic function. A device is visualized in the right ventricle. Right Atrium: The right atrium is consistent with a transplant. Aortic Valve: The aortic valve was not well visualized. There is no evidence of aortic valve regurgitation. The peak instantaneous gradient of the aortic valve is 12.2 mmHg. Mitral Valve: The mitral valve is normal in structure. There is mild mitral valve regurgitation. Tricuspid Valve: The tricuspid valve is structurally normal. There is mild tricuspid regurgitation. The Doppler estimated RVSP is mildly elevated at 35.9 mmHg. Pulmonic Valve: The pulmonic valve was not assessed. Pulmonic valve regurgitation was not assessed. Pericardium: There is a trivial to small  pericardial effusion. Aorta: The aortic root is normal.  CONCLUSIONS:  1. Left ventricular systolic function is moderately to severely decreased with a 30-35% estimated ejection fraction.  2. There is reduced right ventricular systolic function.  3. Mildly elevated RVSP.  4. There is global hypokinesis of the left ventricle with minor regional variations. QUANTITATIVE DATA SUMMARY: 2D MEASUREMENTS:                           Normal Ranges: IVSd:          1.10 cm    (0.6-1.1cm) LVPWd:         1.20 cm    (0.6-1.1cm) LVIDd:         4.60 cm    (3.9-5.9cm) LVIDs:         3.90 cm LV Mass Index: 102.3 g/m2 LV % FS        15.2 % LV SYSTOLIC FUNCTION BY 2D PLANIMETRY (MOD):                     Normal Ranges: EF-A4C View: 25.7 % (>=55%) EF-A2C View: 38.1 % EF-Biplane:  33.6 % AORTIC VALVE:                        Normal Ranges: AoV Vmax:    1.75 m/s  (<=1.7m/s) AoV Peak P.2 mmHg (<20mmHg)  RIGHT VENTRICLE: RV s' 0.08 m/s TRICUSPID VALVE/RVSP:                             Normal Ranges: Peak TR Velocity: 2.87 m/s RV Syst Pressure: 35.9 mmHg (< 30mmHg) IVC Diam:         2.00 cm  [id number] Param Doan MD Electronically signed on 3/4/2024 at 1:38:45 PM  ** Final **     XR chest 1 view    Result Date: 3/4/2024  Interpreted By:  Raman Roa, STUDY: XR CHEST 1 VIEW;  3/4/2024 3:32 am   INDICATION: Signs/Symptoms:CTICU morning CXR.   COMPARISON: Chest radiograph dated 3/3/2024   ACCESSION NUMBER(S): [id number]   ORDERING CLINICIAN: ENRRIQUE CRISTINA   FINDINGS: AP radiograph of the chest   Decreased lung volumes are noted. The patient is rotated to the left. Sternal sutures are noted. An Impella device is in adequate position. The heart is mildly enlarged. Interstitial opacities are demonstrated bilaterally subsegmental atelectasis is suggested within the retrocardiac left lower lobe.       1. Mild interstitial opacities bilaterally accentuated by decreased lung volumes. The pulmonary vascular distribution is similar previous study. 2.  Possible subsegmental atelectasis and left pleural effusion obscured by the left ventricle.       Signed by: Raman Roa 3/4/2024 8:49 AM Dictation workstation:   [id number]    XR chest 1 view    Result Date: 3/3/2024  Interpreted By:  Shayne Waller, STUDY: XR CHEST 1 VIEW;  3/3/2024 3:44 am   INDICATION: Signs/Symptoms:cardiogenic shock on MCS.   COMPARISON: 03/02/2024.   ACCESSION NUMBER(S): [id number]   ORDERING CLINICIAN: KIM MEHTA       1.  Removal of the ETT, left internal jugular catheter, right internal jugular  [place]-Estevan catheter. Stable position of the Impella device. 2. Cardiac silhouette is mildly enlarged. 3. Pulmonary vessels are congested with mild pulmonary edema/fluid overload., slightly improved aeration since last exam. 4. Small left-sided pleural effusion. 5. No pneumothorax seen.       MACRO: None   Signed by: Shayne Waller 3/3/2024 12:54 PM Dictation workstation:   [id number]    XR abdomen 1 view    Result Date: 3/2/2024  Interpreted By:  Shayne Waller and Liller Gregory STUDY: XR ABDOMEN 1 VIEW;  3/2/2024 7:42 am   INDICATION: Signs/Symptoms:OG.   COMPARISON: 02/24/2024   ACCESSION NUMBER(S): [id number]   ORDERING CLINICIAN: KIM MEHTA   FINDINGS: Enteric tube projects expected location of the gastric body.   Nonobstructive bowel gas pattern. No evidence to suggest pneumoperitoneum.   Visualized lungs are clear.   No acute osseous injury.       Nonobstructive bowel gas pattern. Enteric tube projects over the expected location of the gastric body.   I personally reviewed the images/study and I agree with the findings as stated above by resident physician, Dr. Sloan Ybarra. The study was interpreted at [facility] in [facility].   MACRO: none   Signed by: Shayne Waller 3/2/2024 11:48 AM Dictation workstation:   [id number]    XR chest 1 view    Result Date: 3/2/2024  Interpreted By:  Shayne Waller, STUDY: XR CHEST 1 VIEW;  3/2/2024  3:18 am   INDICATION: Signs/Symptoms:cardiogenic shock on MCS.   COMPARISON: 03/01/2024.   ACCESSION NUMBER(S): [id number]   ORDERING CLINICIAN: LYN SELBY       1.  ETT 1.9 cm above remedios. Stable position of the Impella device. [place]-Estevan catheter with the tip overlies the pulmonic trunk. 2. Stable postoperative changes. 3. Low lung volumes with bronchovascular crowding. 4. Cardiac silhouette is mildly enlarged. 5. Pulmonary vessels are congested with mild pulmonary edema. 6. Small left-sided pleural effusion. 7. No pneumothorax seen.       MACRO: None   Signed by: Shayne Waller 3/2/2024 11:48 AM Dictation workstation:   [id number]    XR chest 1 view    Result Date: 3/2/2024  Interpreted By:  Shayne Waller and Liller Gregory STUDY: XR CHEST 1 VIEW;  3/1/2024 11:14 pm   INDICATION: Signs/Symptoms:s/p impella.   COMPARISON: Same day radiograph of the chest 3:25 a.m.   ACCESSION NUMBER(S): [id number]   ORDERING CLINICIAN: ENRRIQUE CRISTINA   FINDINGS: AP radiograph of the chest was provided.   Left internal jugular central venous catheter tip projects over the expected location of the left brachiocephalic vein. Postsurgical changes consistent with median sternotomy are seen. Endotracheal tube terminates 3.2 cm from the remedios. Impella device overlies the expected location of the left ventricle. Pulmonary artery pressure monitoring device is in place. Surgical clips overlie the right axillary region.   CARDIOMEDIASTINAL SILHOUETTE: Cardiomediastinal silhouette is stable in size and configuration.   LUNGS: Low lung volumes contributing to bronchovascular crowding. May be trace left effusion. No pneumothorax.   ABDOMEN: No remarkable upper abdominal findings.   BONES: No osseous injury.       1. Trace left effusion with associated adjacent atelectasis/consolidation superimposed on bronchovascular crowding. 2. Medical devices described above.   I personally reviewed the images/study and I agree with the findings as  stated above by resident physician, Dr. Sloan Ybarra. The study was interpreted at [facility] in [facility].   MACRO: none.   Signed by: Shayne Waller 3/2/2024 8:52 AM Dictation workstation:   [id number]    FL fluoro images no charge    Result Date: 3/1/2024  These images are not reportable by radiology and will not be interpreted by  Radiologists.    Anesthesia Intraoperative Transesophageal Echocardiogram    Result Date: 3/1/2024  [facility] Dept of Anesthesiology, [address] [place] [address]                     Tel [phone] and Fax [phone] TRANSESOPHAGEAL ECHOCARDIOGRAM REPORT  Patient Name:     MIGUEL MONTELONGO JUDIE      Reading          [id number] Harpreet BASS                Physician:       MD Study Date:       3/1/2024             Ordering         [id number] HARPREET ZUNIGA                                        Provider:        ARTHUR MRN/PID:          [id number]             Fellow: Accession#:       [id number]         Nurse: Date of           1990 / 33 years  Sonographer: Birth/Age: Gender:           F                    Additional                                        Staff: BSA / BMI:        m2 / kg/m2           Encounter#:      [id number] Study Type:    ANESTHESIA INTRAOPERATIVE BOB Diagnosis/ICD: Left ventricular failure-I50.1 PHYSICIAN INTERPRETATION: Left Ventricle: The left ventricular systolic function is moderately to severely decreased, with an estimated ejection fraction of 30%. The left ventricular cavity size is normal. Left ventricular diastolic filling was not assessed. Left Atrium: The left atrium is enlarged. There is no evidence of a patent foramen ovale. The left atrial appendage is enlarged and there is no thrombus visualized in the left atrial appendage. Right Ventricle: The right ventricle is mildly enlarged. There is mildly reduced right ventricular systolic function. Right Atrium: The right atrium  enlarged. Aortic Valve: The aortic valve is trileaflet. There is no evidence of aortic valve stenosis. There is no evidence of aortic valve regurgitation. Mitral Valve: The mitral valve is normal in structure. There is no evidence of mitral valve stenosis. There is moderate mitral valve regurgitation which is centrally directed. Tricuspid Valve: The tricuspid valve is structurally normal. There is mild tricuspid regurgitation. Pulmonic Valve: The pulmonic valve is structurally normal. There is mild pulmonic valve regurgitation. Pericardium: There is a small pericardial effusion. Aorta: The aortic root is normal. There is no evidence of aortic dissection. The ascending Ao diameter is 3.0 cm. Additional Comments: Study performed on inotropic support. Surgeon aware of all findings. In comparison to the previous echocardiogram(s): Not performed on intraoperative study.  CONCLUSIONS:  1. Left ventricular systolic function is moderately to severely decreased with a 30% estimated ejection fraction.  2. There is mildly reduced right ventricular systolic function.  3. The left atrium is enlarged.  4. Moderate mitral valve regurgitation.  5. Aortic valve stenosis is not present. POST CARDIOPULMONARY BYPASS REPORT: Successful placement of Impella 5.5 device into LV. Surgeon aware of all findings.  QUANTITATIVE DATA SUMMARY: MITRAL INSUFFICIENCY:                           Normal Ranges: PISA Radius:  0.7 cm MR Alias Claude: 40.4 cm/s MR Flow Rt:   127.96 ml/s  [id number] Arian Ha MD Electronically signed on 3/1/2024 at 8:38:48 PM  ** Final **     Point of Care Ultrasound    Result Date: 3/1/2024  Demetrio Murrell MD     3/1/2024  6:34 PM Point-of-care ultrasound ordered for patient's history of heart failure with rising tachycardia rising creatinine.  Apical four-chamber parasternal long axis and parasternal short axis transthoracic views were obtained.  LV function moderately reduced with EF 30 to 40%.  Right ventricle  mildly enlarged but shows okay systolic function.  Trivial pericardial effusion.  IVC with minimal respiratory variation and 2 cm.  Hepatic vein Doppler ultrasound with SD reversal, portal waveform with undulation both consistent with venous congestion. Point-of-care cardiac echo shows cardiac activity is present, trivial pericardial effusion and reduced ejection fraction. Venous access ultrasound shows evidence of moderate to severe venous congestion    Transthoracic Echo (TTE) Limited    Result Date: 3/1/2024   [facility], [address] [place] [address]                Tel [phone] and Fax [phone] TRANSTHORACIC ECHOCARDIOGRAM REPORT  Patient Name:      MIGUEL MONTELONGO JUDIE      Reading Physician:    [id number] Arian BASS MD Study Date:        3/1/2024             Ordering Provider:    [id number] ELENA QUINTERO MRN/PID:           [id number]             Fellow: Accession#:        [id number]         Nurse: Date of Birth/Age: 1990 / 33 years  Sonographer:          Adryan Meredith RDCS Gender:            F                    Additional Staff: Height:            154.94 cm            Admit Date:           2/15/2024 Weight:            91.17 kg             Admission Status:     Inpatient - STAT BSA / BMI:         1.89 m2 / 37.98      Encounter#:           [id number]                    kg/m2                                         Department Location:  [facility] Blood Pressure: 111 /62 mmHg Study Type:    TRANSTHORACIC ECHO (TTE) LIMITED Diagnosis/ICD: Cardiogenic shock-R57.0; Heart transplant status-Z94.1 Indication:    shock, s/p heart tx CPT Code:      Echo Limited-45761 Patient History: Pertinent History: ECMO decannulation 2.29.24, 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 Study Detail: The following Echo studies were performed: 2D. Technically               challenging study due to patient lying in supine position. The               patient is on a balloon pump.  PHYSICIAN INTERPRETATION: Left Ventricle: The left ventricular systolic function is moderately decreased, with an estimated ejection fraction of 30-35%. There is global hypokinesis of the left ventricle with minor regional variations. The left ventricular cavity size is normal. The left ventricular septal wall thickness is mildly increased. There is mild concentric left ventricular hypertrophy. Left ventricular diastolic filling was not assessed. Left Atrium: The left atrium is suggestive of transplant. Right Ventricle: The right ventricle is mildly enlarged. There is reduced right ventricular systolic function. A device is visualized in the right ventricle. Right Atrium: The right atrium is suggestive of a transplant. Aortic Valve: The aortic valve is trileaflet. Aortic valve regurgitation was not assessed. Mitral Valve: The mitral valve is normal in structure. Mitral valve regurgitation was not assessed. Tricuspid Valve: The tricuspid valve is structurally normal. Tricuspid regurgitation was not assessed. Pulmonic Valve: The pulmonic valve is not well visualized. Pulmonic valve regurgitation was not assessed. Pericardium: There is a trivial pericardial effusion. Aorta: The aortic root is normal. There is an IABP noted in the descending aorta. Systemic Veins: The inferior vena cava appears mildly dilated. There is less than 50% IVC collapse with inspiration. In comparison to the previous echocardiogram(s): Compared with study from 2/29/2024, LVEF decreased from 44% to 30-35% on current study.  CONCLUSIONS:  1. Left ventricular systolic function is moderately decreased with a 30-35% estimated ejection fraction.  2. There is reduced right ventricular systolic function.  3. There is an IABP noted in the descending aorta.   4. There is global hypokinesis of the left ventricle with minor regional variations. QUANTITATIVE DATA SUMMARY: 2D MEASUREMENTS:                           Normal Ranges: IVSd:          1.20 cm    (0.6-1.1cm) LVPWd:         1.10 cm    (0.6-1.1cm) LVIDd:         4.60 cm    (3.9-5.9cm) LVIDs:         3.80 cm LV Mass Index: 101.9 g/m2 LV % FS        17.4 % TRICUSPID VALVE/RVSP:                   Normal Ranges: IVC Diam: 2.30 cm  [id number] Arian Abbasi MD Electronically signed on 3/1/2024 at 5:26:05 PM  ** Final **     XR chest 1 view    Result Date: 3/1/2024  Interpreted By:  Raman Roa, STUDY: XR CHEST 1 VIEW;  3/1/2024 4:11 am   INDICATION: Signs/Symptoms:cardiogenic shock on MCS.   COMPARISON: Chest radiograph dated 2/29/2024   ACCESSION NUMBER(S): [id number]   ORDERING CLINICIAN: LYN SELBY   FINDINGS: AP radiograph of the chest   The swans Estevan catheter is positioned within the region of the left main pulmonary artery. The left internal jugular catheter is positioned within the superior vena cava near the origin of the brachiocephalic vein. The balloon pump radiopaque marker is positioned at the level of the aortic arch.. The ECMO device is been removed.   The heart is maximum normal in size. The retrocardiac left lower lobe is obscured by the left ventricle. Otherwise the lungs are well aerated.       1. No acute cardiopulmonary pathology 2. Likely subsegmental atelectasis within the retrocardiac left lower lobe       Signed by: Raman Roa 3/1/2024 9:49 AM Dictation workstation:   [id number]    Transthoracic Echo (TTE) Limited    Result Date: 2/29/2024   [facility], [address] [place] [address]                Tel [phone] and Fax [phone] TRANSTHORACIC ECHOCARDIOGRAM REPORT  Patient Name:      MIGUEL DIMAS      Reading Physician:    [id number] Francis Rodríguez MD Study Date:        2/29/2024            Ordering  Provider:    [id number] LYN SELBY MRN/PID:           [id number]             Fellow: Accession#:        [id number]         Nurse: Date of Birth/Age: 1990 / 33 years  Sonographer:          Adryan Meredith RDCS Gender:            F                    Additional Staff: Height:            152.40 cm            Admit Date:           2/15/2024 Weight:            91.17 kg             Admission Status:     Inpatient -                                                               [facility]                                                               threatening BSA / BMI:         1.87 m2 / 39.26      Encounter#:           [id number]                    kg/m2                                         Department Location:  [facility] Blood Pressure: 103 /48 mmHg Study Type:    TRANSTHORACIC ECHO (TTE) LIMITED Diagnosis/ICD: Cardiogenic shock-R57.0 Indication:    s/p ECMo decunnulation to ensure pt able to be maintained off                ECMO CPT Code:      Echo Limited-25011; Doppler Limited-23506; Color Doppler-44121 Patient History: Pertinent History: ECMO decannulation , 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 Study Detail: The following Echo studies were performed: 2D, M-Mode, Doppler and               color flow. Technically challenging study due to patient lying in               supine position. Definity used as a contrast agent for endocardial               border definition. Total contrast used for this procedure was 2.0               mL via IV push.  PHYSICIAN INTERPRETATION: Left Ventricle: The left ventricular systolic function is mildly decreased, with an estimated ejection fraction of 44%. There is global hypokinesis of the left ventricle with minor regional variations. The left ventricular cavity size is normal. The left ventricular septal wall thickness is mildly increased.  There is mildly increased left ventricular posterior wall thickness. Left ventricular diastolic filling was not assessed. Patient persisted with tachycardia at 136 bpm during the study. Left Atrium: The left atrium is consistent with transplant. Right Ventricle: The right ventricle is normal in size. There is reduced right ventricular systolic function. A device is visualized in the right ventricle. Right Atrium: The right atrium is normal in size. There is a device visualized in the right atrium. Aortic Valve: The aortic valve is trileaflet. There is trace to mild aortic valve regurgitation. Mitral Valve: The mitral valve is normal in structure. There is mild to moderate mitral valve regurgitation. Tricuspid Valve: The tricuspid valve is abnormal. There is mild to moderate tricuspid regurgitation. The Doppler estimated RVSP is mildly elevated at 35.3 mmHg. Pulmonic Valve: The pulmonic valve was not assessed. Pulmonic valve regurgitation was not assessed. Pericardium: There is a trivial to small pericardial effusion. Aorta: The aortic root was not assessed. In comparison to the previous echocardiogram(s): Compared with study from 2/28/2024, there is a mild increase in LVEF from 30% to 44%. There is still RV systolic dysfunction.  CONCLUSIONS:  1. Left ventricular systolic function is mildly decreased with a 44% estimated ejection fraction.  2. There is global hypokinesis of the left ventricle with minor regional variations.  3. There is reduced right ventricular systolic function.  4. Mild to moderate mitral valve regurgitation.  5. Mild to moderate tricuspid regurgitation visualized.  6. Mildly elevated RVSP.  7. Patient persisted with tachycardia at 136 bpm during the study.  8. Compared with study from 2/28/2024, there is a mild increase in LVEF from 30% to 44%. There is still RV systolic dysfunction. QUANTITATIVE DATA SUMMARY: 2D MEASUREMENTS:                          Normal Ranges: IVSd:          1.10 cm    (0.6-1.1cm) LVPWd:         1.10 cm   (0.6-1.1cm) LVIDd:         4.40 cm   (3.9-5.9cm) LVIDs:         3.60 cm LV Mass Index: 90.3 g/m2 LV % FS        18.2 % LV SYSTOLIC FUNCTION BY 2D PLANIMETRY (MOD):                     Normal Ranges: EF-A4C View: 43.8 % (>=55%) EF-A2C View: 43.6 % EF-Biplane:  52.1 % MITRAL INSUFFICIENCY:                           Normal Ranges: PISA Radius:  0.3 cm MR VTI:       84.90 cm MR Vmax:      424.00 cm/s MR Alias Claude: 38.5 cm/s MR Volume:    4.36 ml MR Flow Rt:   21.77 ml/s MR EROA:      0.05 cm2  RIGHT VENTRICLE: RV s' 0.09 m/s TRICUSPID VALVE/RVSP:                             Normal Ranges: Peak TR Velocity: 2.84 m/s Est. RA Pressure: 3 mmHg RV Syst Pressure: 35.3 mmHg (< 30mmHg)  [id number] Francis Rodríguez MD Electronically signed on 2/29/2024 at 4:29:31 PM  ** Final **     XR chest 1 view    Result Date: 2/29/2024  Interpreted By:  Raman Roa, STUDY: XR CHEST 1 VIEW;  2/29/2024 3:48 am   INDICATION: Signs/Symptoms:icu.   COMPARISON: Chest radiograph dated 2/28/2024   ACCESSION NUMBER(S): [id number]   ORDERING CLINICIAN: SHELLI ESPITIA   FINDINGS: AP radiograph of the chest     The right internal jugular catheter and left internal jugular catheter appear in satisfactory position. Sternal sutures present. The balloon pump radiopaque marker is less than 2 cm below the level of the aortic arch. The ECMO device is in satisfactory position.   The heart is normal in size. No acute pulmonary infiltrates are seen. The left ventricle obscures the retrocardiac left lower lobe.       1. No acute cardiopulmonary pathology       Signed by: Raman Roa 2/29/2024 12:46 PM Dictation workstation:   [id number]    XR chest 1 view    Result Date: 2/29/2024  Interpreted By:  Raman Roa, STUDY: XR CHEST 1 VIEW;  2/28/2024 4:17 am   INDICATION: Signs/Symptoms:icu.   COMPARISON: Chest radiograph dated 02/27/2024   ACCESSION NUMBER(S): [id number]   ORDERING CLINICIAN: SHELLI ESPITIA   FINDINGS: AP  radiograph of the chest     The right internal jugular swans Estevan catheter is positioned within the right pulmonary artery. The left internal jugular catheter is positioned within the superior vena cava near the origin of the brachiocephalic vein. Sternal sutures are intact. The balloon pump radiopaque marker is in a similar position to previous study. The ECMO device is noted.   The heart is normal in size pulmonary aeration is similar previous study.       1. Status quo       Signed by: Raman Roa 2/29/2024 8:40 AM Dictation workstation:   [id number]    Transthoracic Echo (TTE) Limited    Result Date: 2/28/2024   [facility], [address] [place] [address]                Tel [phone] and Fax [phone] TRANSTHORACIC ECHOCARDIOGRAM REPORT  Patient Name:      MIGUEL DIMAS      Reading Physician:    [id number] Kei BASS MD Study Date:        2/28/2024            Ordering Provider:    [id number] LYN SELBY MRN/PID:           [id number]             Fellow:               [id number] Mg Malik MD Accession#:        [id number]         Nurse: Date of Birth/Age: 1990 / 33 years  Sonographer:          Bernadette Madsen RDCS Gender:            F                    Additional Staff: Height:            152.40 cm            Admit Date:           2/15/2024 Weight:            91.17 kg             Admission Status:     Inpatient - STAT BSA / BMI:         1.87 m2 / 39.26      Encounter#:           [id number]                    kg/m2                                         Department Location:  [facility] Blood Pressure: 103 /61 mmHg Study Type:    TRANSTHORACIC ECHO (TTE) LIMITED Diagnosis/ICD: Cardiogenic shock-R57.0 Indication:    ECMO turn down  CPT Code:      Echo Limited-00674 Patient History: Pertinent History: OHT (3/2022), [place] ECMO. Study Detail: The following Echo studies were performed: 2D. Definity used as a               contrast agent for endocardial border definition. Total contrast               used for this procedure was 2.0 mL via IV push.  PHYSICIAN INTERPRETATION: Left Ventricle: The left ventricular systolic function is moderately to severely decreased, with an estimated ejection fraction of 30%. There is global hypokinesis of the left ventricle with minor regional variations. The left ventricular cavity size is normal. There is concentric left ventricular hypertrophy. Left ventricular diastolic filling was not assessed. Left Atrium: The left atrium is consistent with transplant. Right Ventricle: The right ventricle is normal in size. There is reduced right ventricular systolic function. Right Atrium: The right atrium is normal in size. Aortic Valve: The aortic valve is probably trileaflet. Aortic valve regurgitation was not assessed. Mitral Valve: The mitral valve is normal in structure. Mitral valve regurgitation was not assessed. Tricuspid Valve: The tricuspid valve is structurally normal. Tricuspid regurgitation was not assessed. Pulmonic Valve: The pulmonic valve is not well visualized. Pulmonic valve regurgitation was not assessed. Pericardium: There is a trivial to small pericardial effusion. Aorta: The aortic root is normal. In comparison to the previous echocardiogram(s): Compared with study from 2/27/2024, no significant change.  CONCLUSIONS:  1. Left ventricular systolic function is moderately to severely decreased with a 30% estimated ejection fraction.  2. There is global hypokinesis of the left ventricle with minor regional variations.  3. There is reduced right ventricular systolic function.  4. EF appears to have improved on turn down. QUANTITATIVE DATA SUMMARY: LA VOLUME:                              Normal Ranges: LA Vol  A4C:        65.3 ml   (22+/-6mL/m2) LA Vol Index A4C:  34.9ml/m2 LA Area A4C:       22.0 cm2 LA Major Axis A4C: 6.3 cm  [id number] Kei Lozano MD Electronically signed on 2/28/2024 at 9:26:56 AM  ** Final **     Vascular US lower extremity arterial duplex left    Result Date: 2/27/2024            [place] [address]   Tel [phone] and Fax [phone]  Vascular Lab Report [facility] US LOWER EXTREMITY ARTERIAL DUPLEX LEFT  Patient Name:     MIGUEL DIMAS      Reading           [id number] Rhianna BASS                Physician: Study Date:       2/23/2024            Ordering          [id number] ELENA QUINTERO                                        Physician: MRN/PID:          [id number]             Technologist:     Diandra Carcamo RVT,                                                          [facility] Accession#:       [id number]         Technologist 2: Date of           1990 / 33 years  Encounter#:       [id number] Birth/Age: Gender:           F Admission Status: Inpatient            Location          [facility]                                        Performed:  Diagnosis/ICD: Peripheral vascular disease, unspecified-I73.9 CPT Codes:     99327 Peripheral artery Lower arterial Duplex limited  CONCLUSIONS: Left Lower Arterial: Limited exam due to ECMO line. The distal superficial femoral, popliteal, peroneal, posterior tibial, anterior tibial, and dorsalis pedis arteries are patent with low monophasic flow.  Imaging & Doppler Findings:  Right                    Left  PSV                      PSV          SFA Distal     20 cm/s           Popliteal     13 cm/s          ANGEL Distal     9 cm/s       Peroneal Proximal 16 cm/s          PTA Distal     8 cm/s  [id number] Rhianna Dumont MD Electronically signed by [id number] Rhianna Dumont MD on 2/27/2024 at 2:43:35 PM  ** Final **     Transthoracic Echo (TTE) Limited    Result Date: 2/27/2024   [facility],  [address] [place] [address]                Tel [phone] and Fax [phone] TRANSTHORACIC ECHOCARDIOGRAM REPORT  Patient Name:      MIGUEL MONTELONGO JUDIE      Reading Physician:    [id number] Kei BASS MD Study Date:        2/27/2024            Ordering Provider:    [id number] JILL MARICRUZ                                                               SELENA MRN/PID:           [id number]             Fellow: Accession#:        [id number]         Nurse: Date of Birth/Age: 1990 / 33 years  Sonographer:          Bernadette Madsen RDCS Gender:            F                    Additional Staff: Height:            152.40 cm            Admit Date:           2/15/2024 Weight:            92.53 kg             Admission Status:     Inpatient - STAT BSA / BMI:         1.88 m2 / 39.84      Encounter#:           [id number]                    kg/m2                                         Department Location:  [facility] Blood Pressure: 124 /64 mmHg Study Type:    TRANSTHORACIC ECHO (TTE) LIMITED Diagnosis/ICD: Cardiogenic shock-R57.0 Indication:    ECMO turn down trial CPT Code:      Echo Limited-90713 Patient History: Pertinent History: IABP, [place]-ECMO, 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 Study Detail: The following Echo studies were performed: 2D.  PHYSICIAN INTERPRETATION: Left Ventricle: The left ventricular systolic function is moderately to severely decreased, with an estimated ejection fraction of 30%. There is global hypokinesis of the left ventricle with minor regional variations. The left ventricular cavity size is normal. There is moderate concentric left ventricular hypertrophy. Abnormal (paradoxical) septal motion consistent with post-operative status. Left ventricular diastolic filling was not assessed. Left Atrium: The left atrium is enlarged. Right Ventricle: The right ventricle is normal in size. There  is moderately reduced right ventricular systolic function. Right Atrium: The right atrium is normal in size. Aortic Valve: The aortic valve was not well visualized. Aortic valve regurgitation was not assessed. Mitral Valve: The mitral valve is normal in structure. Mitral valve regurgitation was not assessed. Tricuspid Valve: The tricuspid valve is structurally normal. Tricuspid regurgitation was not assessed. Pulmonic Valve: The pulmonic valve is structurally normal. Pulmonic valve regurgitation was not assessed. Pericardium: There is a trivial to small pericardial effusion. Aorta: The aortic root was not assessed.  CONCLUSIONS:  1. Left ventricular systolic function is moderately to severely decreased with a 30% estimated ejection fraction.  2. There is global hypokinesis of the left ventricle with minor regional variations.  3. Abnormal septal motion consistent with post-operative status.  4. There is moderate concentric left ventricular hypertrophy.  5. There is moderately reduced right ventricular systolic function. QUANTITATIVE DATA SUMMARY:  [id number] Kei Lozano MD Electronically signed on 2/27/2024 at 11:08:39 AM  ** Final **     XR chest 1 view    Result Date: 2/27/2024  Interpreted By:  Raman Roa, STUDY: XR CHEST 1 VIEW;  2/27/2024 4:02 am   INDICATION: Signs/Symptoms:icu.   COMPARISON: Chest radiograph dated 2/26/2024   ACCESSION NUMBER(S): [id number]   ORDERING CLINICIAN: SHELLI ESPITIA   FINDINGS: AP radiograph of the chest   The right internal jugular swans Estevan catheter is positioned within the right pulmonary artery. The left internal jugular catheter is positioned within the superior vena cava near the origin of the brachiocephalic vein. Sternal sutures are intact. Balloon pump radiopaque marker appears to have been advanced since the prior study. The tip of the fiduciary marker is closer to the aortic arch. ECMO device is noted.   The heart is normal in size. The retrocardiac left lower lobe left  diaphragm and costophrenic sulcus are obscured by the left ventricle. No acute pulmonary infiltrates are seen. There is improved pulmonary aeration within the right lower lobe and decreased discoid atelectasis.       1. Improved pulmonary aeration and improved pulmonary edema in comparison to the prior study       Signed by: Raman Roa 2/27/2024 10:39 AM Dictation workstation:   [id number]    XR chest 1 view    Result Date: 2/27/2024  Interpreted By:  Raman Roa, STUDY: XR CHEST 1 VIEW;  2/26/2024 3:45 am   INDICATION: Signs/Symptoms:Post op cardiac surgery.   COMPARISON: Chest radiograph dated 02/24/2024   ACCESSION NUMBER(S): [id number]   ORDERING CLINICIAN: LUBNA RIBERA   FINDINGS: AP radiograph of the chest Limitations: Decreased lung volumes.   The swans Estevan catheter is positioned within the right pulmonary artery. The ECMO cannula is in satisfactory position. Balloon pump radiopaque marker is overlying the mid descending thoracic aorta proximally 5 cm from the aortic arch. The left internal jugular catheter is positioned within superior vena cava near the origin of the brachiocephalic vein.   The heart is mildly enlarged.   Interstitial pulmonary edema is improved compared to previous study. There is mild elevation of the right diaphragm. Subsegmental atelectasis above the right diaphragm is noted. Blunting of the left costophrenic sulcus noted.       1. Improved interstitial pulmonary edema in particular within the right lung in comparison to the previous study. 2. Subsegmental atelectasis above the mildly elevated right diaphragm 3. Small left pleural effusion 4. The heart appears enlarged probably magnified by the projection       Signed by: Raman Roa 2/27/2024 8:09 AM Dictation workstation:   [id number]    Transthoracic Echo (TTE) Limited    Result Date: 2/26/2024   [facility], [address] [place] [address]                Tel [phone] and Fax [phone]  TRANSTHORACIC ECHOCARDIOGRAM REPORT  Patient Name:     MIGUEL DIMAS      Reading           [id number] Bhupsuri Izabela BASS                Physician:        MD Study Date:       2/25/2024            Ordering          [id number] JILL WALTERS                                        Provider: MRN/PID:          [id number]             Fellow: Accession#:       [id number]         Nurse: Date of           1990 / 33 years  Sonographer:      Tomas Moran Birth/Age: Gender:           F                    Additional Staff: Weight:                                Admission Status: Inpatient - Routine BSA / BMI:        m2 / kg/m2           Encounter#:       [id number] Study Type:    TRANSTHORACIC ECHO (TTE) LIMITED Diagnosis/ICD: Cardiogenic shock-R57.0  Study Detail: The following Echo studies were performed: 2D.  PHYSICIAN INTERPRETATION: Left Ventricle: The left ventricular systolic function is severely decreased. There is global hypokinesis of the left ventricle with minor regional variations. The left ventricular cavity size is mild to moderately dilated. Abnormal (paradoxical) septal motion consistent with post-operative status. Left ventricular diastolic filling was not assessed. Left Atrium: The left atrium is consistent with transplant. Right Ventricle: The right ventricle is mildly enlarged. There is reduced right ventricular systolic function. A device is visualized in the right ventricle. Right Atrium: The right atrium is consistent with a transplant. Aortic Valve: The aortic valve appears structurally normal. Aortic valve regurgitation was not assessed. Mitral Valve: The mitral valve is normal in structure. Mitral valve regurgitation was not assessed. Tricuspid Valve: The tricuspid valve is structurally normal. Tricuspid regurgitation was not assessed. Pulmonic Valve: The pulmonic valve was not assessed. Pulmonic valve regurgitation was not assessed. Pericardium: There is a small pericardial  effusion. Aorta: The aortic root is normal. In comparison to the previous echocardiogram(s): Compared with study from 2/23/2024, may be a very small imporvement in LV systolic function but overall still severely depressed.  CONCLUSIONS:  1. Left ventricular systolic function is severely decreased.  2. Abnormal septal motion consistent with post-operative status.  3. There is reduced right ventricular systolic function.  4. Compared with study from 2/23/2024, may be a very small imporvement in LV systolic function but overall still severely depressed.  5. There is global hypokinesis of the left ventricle with minor regional variations. QUANTITATIVE DATA SUMMARY:  [id number] Abel Gurrola MD Electronically signed on 2/26/2024 at 6:00:42 PM ** Final **     XR chest 1 view    Result Date: 2/25/2024  Interpreted By:  Angelina Cagle, STUDY: XR CHEST 1 VIEW;  2/25/2024 3:43 am   INDICATION: Signs/Symptoms:CTICU.   COMPARISON: Radiograph dated 02/24/2024, 5:03 p.m.   ACCESSION NUMBER(S): [id number]   ORDERING CLINICIAN: NICOLE NDIAYE   FINDINGS: Enteric tube is in place with the tip outside the field of view. Right IJ [place]-Estevan catheter is in place with the tip projecting over the right main pulmonary artery. Left IJ central venous catheter is projecting over the left brachiocephalic vein. Intra-aortic balloon pump radiopaque marker is projecting over the proximal descending thoracic aorta. Pulmonary artery pressure monitoring device is projecting over the left lung hilum. Again seen ECMO cannula projecting over lower SVC.   Status post median sternotomy. The cardiac silhouette size is unchanged.   Again seen mild interstitial pulmonary edema. No focal infiltrate or pneumothorax. Question trace left pleural effusion.   No acute osseous change.       1. Unchanged mild interstitial pulmonary edema. Cannot exclude trace left pleural effusion. 2. Medical devices and postsurgical changes as described above       Signed  by: Angelina Narayan 2/25/2024 7:46 AM Dictation workstation:   [id number]    XR chest 1 view    Result Date: 2/25/2024  Interpreted By:  Angelina Cagle, STUDY: XR CHEST 1 VIEW;  2/24/2024 5:10 pm   INDICATION: Signs/Symptoms:IABP repositioned.   COMPARISON: Radiograph dated 02/24/2024   ACCESSION NUMBER(S): [id number]   ORDERING CLINICIAN: JILL WALTERS   FINDINGS: Enteric tube is in place with the tip outside the field of view. Right IJ [place]-Estevan catheter is in place with the tip projecting over the right main pulmonary artery. Left IJ central venous catheter is projecting over the left brachiocephalic vein. Intra-aortic balloon pump radiopaque marker is projecting over the aortic knob. Pulmonary artery pressure monitoring device is projecting over the left lung hilum. Again seen ECMO cannula projecting over lower SVC.   Status post median sternotomy. The cardiac silhouette size is unchanged.   Slight interval increase in interstitial edema. No focal infiltrate or pneumothorax. Question trace left pleural effusion.   No acute osseous change.       1. Slight interval worsening of pulmonary edema. 2. Bibasilar atelectasis and suggestion of trace left pleural effusion. 3. Medical devices and postsurgical changes as described above.       Signed by: Angelina Narayan 2/25/2024 7:44 AM Dictation workstation:   [id number]    XR abdomen 1 view    Result Date: 2/24/2024  Interpreted By:  Angelina Cagle, STUDY: XR ABDOMEN 1 VIEW;  2/24/2024 3:23 pm   INDICATION: Signs/Symptoms:dobhoff placement post-extubation.   COMPARISON: Radiograph dated 02/21/2024   ACCESSION NUMBER(S): [id number]   ORDERING CLINICIAN: JILL WALTERS   FINDINGS: Single-view of the abdomen. Lower pelvis is not included. Dobbhoff catheter is projecting over the distal 2nd portion of the duodenum. Nonobstructive bowel gas pattern.  Limited evaluation of pneumoperitoneum on supine imaging, however no gross evidence of free air  is noted.   Partially imaged ECMO cannula in sternotomy wires.   Osseous structures demonstrate no acute bony changes.       1.  Nonobstructive bowel gas pattern. 2. Medical devices as described above   Signed by: Angelina Narayan 2/24/2024 4:32 PM Dictation workstation:   [id number]    XR chest 1 view    Result Date: 2/24/2024  Interpreted By:  Angelina Cagle, STUDY: XR CHEST 1 VIEW;  2/24/2024 4:15 am   INDICATION: Signs/Symptoms:Post op cardiac surgery.   COMPARISON: Radiograph dated 02/23/2024   ACCESSION NUMBER(S): [id number]   ORDERING CLINICIAN: LUBNA RIBERA   FINDINGS: ET tube is terminating 1.6 cm from the remedios. Enteric tube is in place with the tip outside the field of view. Right IJ approach [place]-Estevan catheter is projecting over the distal right main pulmonary artery. Intra-aortic balloon pump radiopaque markers projecting over proximal descending thoracic aorta. ECMO cannulas projecting over the lower SVC. A left IJ central venous catheter is projecting over the expected location of the confluence of brachiocephalic veins. Pulmonary artery pressure monitoring device is projecting over the left lung hilum.   The cardiac silhouette size is within normal limits. Status post median sternotomy.   There is no focal consolidation, edema or pneumothorax. No sizeable pleural effusion. No acute osseous abnormality.       1. Medical devices and postsurgical changes as described above. 2. No focal infiltrate, sizeable pleural effusion, edema or pneumothorax. Mild bibasilar atelectasis.       Signed by: Angelina Narayan 2/24/2024 8:36 AM Dictation workstation:   [id number]    Transthoracic Echo (TTE) Limited    Result Date: 2/23/2024   [facility], [address] [place] [address]                Tel [phone] and Fax [phone] TRANSTHORACIC ECHOCARDIOGRAM REPORT  Patient Name:      MIGUEL DIMAS      Reading Physician:    [id number] Enedelia BASS                                       Dex MCGILL Study Date:        2/23/2024            Ordering Provider:    [id number] ODILON FUENTES MRN/PID:           [id number]             Fellow: Accession#:        [id number]         Nurse: Date of Birth/Age: 1990 / 33 years  Sonographer:          Bernadette Madsen                                                               RDCHRISTIAN Gender:            F                    Additional Staff: Height:            152.40 cm            Admit Date:           2/15/2024 Weight:            99.34 kg             Admission Status:     Inpatient -                                                               Routine BSA / BMI:         1.94 m2 / 42.77      Encounter#:           [id number]                    kg/m2                                         Department Location:  [facility] Blood Pressure: 117 /63 mmHg Study Type:    TRANSTHORACIC ECHO (TTE) LIMITED Diagnosis/ICD: Cardiogenic shock-R57.0 Indication:    Turn down trial CPT Code:      Echo Limited-83325 Patient History: Pertinent History: Heart transplant (03/2022), IABP 2/18, ECMP 2/19, intubated                    2/21, cardiogenic shock, allograft rejection. Study Detail: The following Echo studies were performed: 2D. The patient is               intubated and on a balloon pump.  PHYSICIAN INTERPRETATION: Left Ventricle: The left ventricular systolic function is severely decreased, with an estimated ejection fraction of 10%. The left ventricular cavity size is normal. Left ventricular diastolic filling was not assessed. Left Atrium: The left atrium is consistent with transplant. Right Ventricle: The right ventricle is normal in size. There is severely reduced right ventricular systolic function. Right Atrium: The right atrium is consistent with a transplant. Aortic Valve: The aortic valve is trileaflet. Aortic valve regurgitation was not assessed. Mitral Valve: The mitral  valve is normal in structure. Mitral valve regurgitation was not assessed. Tricuspid Valve: The tricuspid valve was not well visualized. Tricuspid regurgitation was not assessed. Pulmonic Valve: The pulmonic valve is structurally normal. Pulmonic valve regurgitation was not assessed. Pericardium: There is no pericardial effusion noted. Aorta: The aortic root is normal.  CONCLUSIONS:  1. Left ventricular systolic function is severely decreased with a 10% estimated ejection fraction.  2. Poorly visualized anatomical structures due to suboptimal image quality.  3. There is severely reduced right ventricular systolic function.  4. ECMO turn down trial . At 3 L the ejection fraction was globaly and severely reduced at 10% and remained stable as the flow was gradually decreased down to 1L. QUANTITATIVE DATA SUMMARY: 2D MEASUREMENTS:                          Normal Ranges: IVSd:          1.00 cm   (0.6-1.1cm) LVPWd:         1.00 cm   (0.6-1.1cm) LVIDd:         4.40 cm   (3.9-5.9cm) LVIDs:         4.00 cm LV Mass Index: 76.2 g/m2 LV % FS        9.1 %  [id number] Enedelia Kowalski MD Electronically signed on 2/23/2024 at 3:28:33 PM  ** Final **     XR chest 1 view    Result Date: 2/23/2024  Interpreted By:  Duarte Guillory, STUDY: XR CHEST 1 VIEW;  2/23/2024 1:13 pm   INDICATION: Signs/Symptoms:PA catheter positioning..   COMPARISON: Radiographs since 02/20/2024, the most recent 02/23/2024 at 3:26 a.m..   ACCESSION NUMBER(S): [id number]   ORDERING CLINICIAN: ODILON FUENTES   FINDINGS: Again identified are midline sternotomy sutures, endotracheal and enteric tubes, pulmonary arterial catheter, and right central venous line. The tip of the pulmonary arterial catheter appears to lie in the distal right pulmonary artery or its proximal interlobar branch. The tip of the endotracheal tube lies 2.6 cm above the remedios.   Heart size is within upper limits of normal and unchanged since previous.   There is no convincing pulmonary  parenchymal mass nor infiltrate on the current study. There is also no significant pleural fluid or pneumothorax.   Osseous structures are grossly intact.         1. Tubes and lines in place, as noted. 2. No other significant acute cardiopulmonary process on the current examination       MACRO: None   Signed by: Duarte Guillory 2/23/2024 1:59 PM Dictation workstation:   [id number]    XR chest 1 view    Result Date: 2/23/2024  Interpreted By:  Duarte Guillory, STUDY: XR CHEST 1 VIEW;  2/23/2024 3:32 am   INDICATION: Signs/Symptoms:Post op cardiac surgery.   COMPARISON: 02/22/2024.   ACCESSION NUMBER(S): [id number]   ORDERING CLINICIAN: LUBNA RIBERA   FINDINGS: Midline sternotomy sutures are again noted. Also again identified are endotracheal and enteric tubes, pulmonary arterial catheter, and left internal jugular venous line. Tip of IABP is projected along the upper descending thoracic aorta at the approximate level of the left hilum.   The tip of the endotracheal tube lies 2.7 cm above the remedios.   Lung volumes are slightly diminished. There is questionable mild atelectasis, infiltrate, or pleural fluid at the left lung base, the appearance of which could be at least partially artifactual. Remaining lung fields are otherwise clear. There is no pneumothorax.       1.  Questionable mild left basilar density, which may also been present on the previous day. 2. No other significant acute interval change.       MACRO: None   Signed by: Duarte Guillory 2/23/2024 1:57 PM Dictation workstation:   [id number]    XR chest 1 view    Result Date: 2/23/2024  Interpreted By:  Duarte Guillory, STUDY: XR CHEST 1 VIEW;  2/22/2024 3:46 am   INDICATION: Signs/Symptoms:Post op cardiac surgery.   COMPARISON: Radiographs since 02/20/2024, the most recent 02/21/2024.   ACCESSION NUMBER(S): [id number]   ORDERING CLINICIAN: LUBNA RIBERA   FINDINGS: Again identified are midline sternotomy sutures.   Endotracheal and enteric tubes are again  noted. The tip of the endotracheal tube lies 1.6 cm above the remedios.   Tip of the pulmonary arterial catheter is faintly visualized within the main or right pulmonary artery. Left internal jugular venous line is also again noted. Metallic tip of the IABP is again noted along the upper descending thoracic aorta, similar to previous.   Heart size is within upper limits of normal and unchanged since previous.   There are suspicious developing atelectasis or consolidation and pleural fluid at the left base compared to previous. Remaining lung fields are clear other significant new mass or infiltrate. There is no significant pleural fluid on the right. There is no pneumothorax on either side.       1.  Possible developing atelectasis or consolidation and pleural fluid at the left lung base compared to previous. 2. Other findings, as discussed, similar to previous.       MACRO: None   Signed by: Duarte Guillory 2/23/2024 1:54 PM Dictation workstation:   [id number]    Transthoracic Echo (TTE) Limited    Result Date: 2/22/2024   [facility], [address] [place] [address]                Tel [phone] and Fax [phone] TRANSTHORACIC ECHOCARDIOGRAM REPORT  Patient Name:      MIGUEL DIMAS      Reading Physician:    [id number] Param BASS MD Study Date:        2/22/2024            Ordering Provider:    [id number] KIM MEHTA MRN/PID:           [id number]             Fellow: Accession#:        [id number]         Nurse: Date of Birth/Age: 1990 / 33 years  Sonographer:          Adryan Meredith RDCS Gender:            F                    Additional Staff: Height:            154.94 cm            Admit Date:           2/15/2024 Weight:            99.34 kg             Admission Status:     Inpatient -                                                                Routine BSA / BMI:         1.96 m2 / 41.38      Encounter#:           [id number]                    kg/m2                                         Department Location:  [facility] Blood Pressure: 124 /64 mmHg Study Type:    TRANSTHORACIC ECHO (TTE) LIMITED Diagnosis/ICD: Cardiogenic shock-R57.0 Indication:    cardiogenic shock CPT Code:      Echo Limited-60986; Doppler Limited-99714; Color Doppler-17594 Patient History: Pertinent History: Heart trasnplant rejection, VA ecmo 2/19, intubated 2/21,                    IABP 2/18. Study Detail: The following Echo studies were performed: 2D, M-Mode, Doppler and               color flow. Technically challenging study due to body habitus,               patient lying in supine position and ECMO. The patient is               intubated. Definity used as a contrast agent for endocardial               border definition. Total contrast used for this procedure was 3.0               mL via IV push.  PHYSICIAN INTERPRETATION: Left Ventricle: The left ventricular systolic function is severely decreased, with an estimated ejection fraction of 10%. There is global hypokinesis of the left ventricle with minor regional variations. The left ventricular cavity size is normal. Left ventricular diastolic filling was not assessed. Left Atrium: The left atrium is consistent with transplant. Right Ventricle: The right ventricle is normal in size. There is severely reduced right ventricular systolic function. Right Atrium: The right atrium is consistent with a transplant. There is a device visualized in the right atrium. Aortic Valve: The aortic valve is trileaflet. There is mild aortic valve regurgitation. Mitral Valve: The mitral valve is mildly thickened. There is mild to moderate mitral valve regurgitation. Tricuspid Valve: The tricuspid valve is structurally normal. There is mild tricuspid regurgitation. The right ventricular systolic  pressure is unable to be estimated. Pulmonic Valve: The pulmonic valve is structurally normal. Pulmonic valve regurgitation was not assessed. Pericardium: There is a trivial pericardial effusion. Aorta: The aortic root is normal.  CONCLUSIONS:  1. Left ventricular systolic function is severely decreased with a 10% estimated ejection fraction.  2. There is severely reduced right ventricular systolic function.  3. Mild to moderate mitral valve regurgitation.  4. Mild aortic valve regurgitation.  5. There is global hypokinesis of the left ventricle with minor regional variations. QUANTITATIVE DATA SUMMARY: 2D MEASUREMENTS:                          Normal Ranges: LAs:           3.50 cm   (2.7-4.0cm) IVSd:          0.80 cm   (0.6-1.1cm) LVPWd:         0.80 cm   (0.6-1.1cm) LVIDd:         4.80 cm   (3.9-5.9cm) LVIDs:         4.60 cm LV Mass Index: 64.5 g/m2 LV % FS        4.2 % TRICUSPID VALVE/RVSP:                             Normal Ranges: Peak TR Velocity: 2.36 m/s RV Syst Pressure: 25.3 mmHg (< 30mmHg)  [id number] Param Doan MD Electronically signed on 2/22/2024 at 4:13:29 PM  ** Final **     XR abdomen 1 view    Result Date: 2/21/2024  Interpreted By:  Duarte Guillory and Ogievich Taessa STUDY: XR ABDOMEN 1 VIEW;  2/21/2024 9:09 am   INDICATION: Signs/Symptoms:dobhoff.   COMPARISON: Abdominal radiograph 02/19/2024   ACCESSION NUMBER(S): [id number]   ORDERING CLINICIAN: KIM MEHTA   FINDINGS: AP semi-erect abdominal radiograph.   LINES/TUBES/DEVICES: Interval insertion of Dobhoff enteric tube with distal tip overlying the expected location of the 2nd portion of the duodenum. Partial visualization of the Model-Estevan catheter and ECMO cannula with distal tips out of the field of view.   ABDOMEN: Pelvis is out of the field of view, within this limitation, there is mildly distended gaseous filled bowel loops. Limited evaluation of pneumoperitoneum on supine imaging, however no gross evidence of free air is noted.    LUNGS: Left basilar density likely representing edema, atelectasis, or consolidation. See same day chest x-ray.   BONE: Osseous structures demonstrate no acute bony changes.       1. Interval insertion of Dobhoff enteric tube with distal tip overlying the expected location of the 2nd portion of the duodenum. 2. Suboptimal radiograph with the pelvis out of the field of view. Within this limitation, there is mildly distended gaseous filled bowel loops. Recommend clinical correlation with ileus.   I personally reviewed the images/study and I agree with the findings as stated by Heather Lopez DO, PGY-2. This study was interpreted at [facility], [place], [place].   MACRO: None   Signed by: Duarte Guillory 2/21/2024 10:28 AM Dictation workstation:   [id number]    Transthoracic Echo (TTE) Limited    Result Date: 2/21/2024   [facility], [address] [place] [address]                Tel [phone] and Fax [phone] TRANSTHORACIC ECHOCARDIOGRAM REPORT  Patient Name:      YAIRKENNEDY MONTELONGO JUDIE      Reading Physician:    [id number] Kei BASS MD Study Date:        2/20/2024            Ordering Provider:    [id number] ZEESHAN RAHMAN MRN/PID:           [id number]             Fellow:               [id number] Zeeshan Rahman MD Accession#:        [id number]         Nurse: Date of Birth/Age: 1990 / 33 years  Sonographer:          RENETTA RAHMAN Gender:            F                    Additional Staff: Height:                                 Admit Date:           2/15/2024 Weight:                                 Admission Status:     Inpatient - STAT BSA / BMI:         m2 / kg/m2           Encounter#:           [id number]                                         [facility]  Type:    TRANSTHORACIC ECHO (TTE) LIMITED Diagnosis/ICD: Heart transplant rejection-T86.21 Indication:    s/p Heart Transplant CPT Code:      Echo Limited-80242; Doppler Limited-42672; Color Doppler-42268  Study Detail: The following Echo studies were performed: 2D, M-Mode, Doppler and               color flow.  PHYSICIAN INTERPRETATION: Left Ventricle: The left ventricular systolic function is severely decreased, with an estimated ejection fraction of 10%. There is global hypokinesis of the left ventricle with minor regional variations. The left ventricular cavity size is normal. Left ventricular diastolic filling was not assessed. Left Atrium: The left atrium is consistent with transplant. Right Ventricle: The right ventricle is normal in size. There is severely reduced right ventricular systolic function. A device is visualized in the right ventricle. Right Atrium: The right atrium is consistent with a transplant. There is a device visualized in the right atrium. Aortic Valve: The aortic valve is probably trileaflet. There is mild aortic valve regurgitation. Mitral Valve: The mitral valve is mildly thickened. There is moderate mitral valve regurgitation. Tricuspid Valve: The tricuspid valve is structurally normal. There is mild tricuspid regurgitation. The right ventricular systolic pressure is unable to be estimated. Pulmonic Valve: The pulmonic valve is not well visualized. Pulmonic valve regurgitation was not assessed. Pericardium: There is a trivial pericardial effusion. Aorta: The aortic root was not well visualized. There is no dilatation of the aortic root.  CONCLUSIONS:  1. Limited fellow echo.  2. Left ventricular systolic function is severely decreased with a 10% estimated ejection fraction.  3. There is global hypokinesis of the left ventricle with minor regional variations.  4. There is severely reduced right ventricular systolic function.  5. Mild aortic valve regurgitation.  6. Moderate mitral valve  regurgitation. QUANTITATIVE DATA SUMMARY: AORTA MEASUREMENTS:                      Normal Ranges: Ao Sinus, d: 3.00 cm (2.1-3.5cm)  [id number] Kei Lozano MD Electronically signed on 2/21/2024 at 10:17:52 AM  ** Final **     XR chest 1 view    Result Date: 2/21/2024  Interpreted By:  Duarte Guillory, STUDY: XR CHEST 1 VIEW;  2/21/2024 3:27 am   INDICATION: Signs/Symptoms:Post op cardiac surgery.   COMPARISON: 02/21/2024, 1:57 a.m..   ACCESSION NUMBER(S): [id number]   ORDERING CLINICIAN: LUBNA RIBERA   FINDINGS: Heart size is unchanged.   Again identified are midline sternotomy sutures, IABP tip along the upper descending thoracic aorta, and left internal jugular venous catheter. The tip of a  pulmonary arterial catheter most likely lies in the right pulmonary artery.   There are persistent diffuse bilateral pulmonary densities that could represent either edema or (less likely) consolidation. There may also be a left basilar pleural effusion. These findings are either similar to or minimally less prominent than on the radiograph obtained earlier the same date. There is no pneumothorax.   Endotracheal tube has been inserted. Its tip lies approximately 0.7 cm above the remedios.       1.  Bilateral pulmonary densities most likely representing edema. Small left pleural effusion. Findings are either unchanged or at most minimally improved compared to previous. 2. Interval placement of endotracheal tube with tip just above remedios.       MACRO: None   Signed by: Duarte Guillory 2/21/2024 9:09 AM Dictation workstation:   [id number]    XR chest 1 view    Result Date: 2/21/2024  Interpreted By:  Duarte Guillory and Meyers Emily STUDY: XR CHEST 1 VIEW;  2/21/2024 2:02 am   INDICATION: Signs/Symptoms:rhonchi.   COMPARISON: Chest radiograph 02/20/2024   ACCESSION NUMBER(S): [id number]   ORDERING CLINICIAN: ENRRIQUE CRISTINA   FINDINGS: AP radiograph of the chest was provided.   Right IJ approach Conway-Estevan catheter with its tip overlying  the expected location of the right main pulmonary artery. Left IJ central venous catheter with its tip overlying the brachiocephalic vein. Stable appearance of an ECMO cannula overlying the expected location of the right atrium. IABP radiopaque marker again overlies the upper descending thoracic aorta. Postsurgical change of prior median sternotomy with cerclage wires.   CARDIOMEDIASTINAL SILHOUETTE: Cardiomediastinal silhouette is normal in size and configuration.   LUNGS: Left lung base is not entirely included. Pulmonary vascularity appears more prominent than on the previous day. There also appear to be new diffuse patchy areas of pulmonary edema or consolidation (more likely the former), right-greater-than-left.   BONES: No acute osseous changes.       1. Interval development of increased pulmonary vascularity and airspace opacities, more prominent on the right. Although nonspecific, findings most likely represent pulmonary edema; pneumonia less likely but can not be entirely excluded. Recommend correlation with patient's volume status. 2. Medical lines and devices as detailed above.   I personally reviewed the images/study, and I agree with the findings as stated above. This study was interpreted at [place], [place].   MACRO: None   Signed by: Duarte Guillory 2/21/2024 9:07 AM Dictation workstation:   [id number]    XR chest 1 view    Result Date: 2/21/2024  Interpreted By:  Duarte Guillory and Jiang Sirui STUDY: XR CHEST 1 VIEW;  2/20/2024 5:49 pm   INDICATION: Signs/Symptoms:tachypnea.   COMPARISON: Chest radiograph 02/20/2024   ACCESSION NUMBER(S): [id number]   ORDERING CLINICIAN: LUBNA RIBERA   FINDINGS: AP radiograph of the chest was provided.   Stable positioning of the right IJ approach Pioneer-Estevan catheter, which is seen with distal tip overlying the main pulmonary artery. Left IJ central venous catheter distal tip overlying the brachiocephalic. Stable  positioning of the ECMO cannula overlying the upper right atrium. Stable positioning of the IABP with tip at approximate level of the left hilum. Prior median sternotomy with intact sternal cerclage wires. Abandoned epicardial pacer wires are noted.   CARDIOMEDIASTINAL SILHOUETTE: Cardiomediastinal silhouette is stable in size and configuration.   LUNGS: Suggestion of a left-sided pleural effusion. No consolidation or pneumothorax.   ABDOMEN: No remarkable upper abdominal findings.   BONES: No acute osseous changes.       1.  Suggestion of small left-sided pleural effusion and bibasilar atelectasis. No pneumothorax. 2.  Medical devices as above.   I personally reviewed the image(s) / study and I agree with the findings as stated by Gera Pryor MD. This study was interpreted at [facility], [place], [place].   MACRO: None   Signed by: Duarte Guillory 2/21/2024 9:05 AM Dictation workstation:   [id number]    XR chest 1 view    Result Date: 2/20/2024  Interpreted By:  Duarte Guillory,  Nevin Romero STUDY: XR CHEST 1 VIEW;  2/20/2024 3:18 pm   INDICATION: Signs/Symptoms:s/p line placement. Admitted to CTICU for cardiogenic shock. Started on VA ECMO cannulation 2/19.   COMPARISON: Chest x-ray 02/20/2021, 02/19/2024, 2/18/2024, 02/17/2024   ACCESSION NUMBER(S): [id number]   ORDERING CLINICIAN: LUBNA RIBERA   FINDINGS: AP semi-erect radiograph of the chest   LINES/TUBES/DEVICES: Stable postsurgical changes of median sternotomy with sternotomy wires intact and surgical clips overlying the mediastinum. Left IJ central venous catheter with distal tip overlying the expected location of the left brachiocephalic vein. Right-sided IJ approach Holt-Estevan catheter is visualized with the distal tip projecting over the expected region of the right main pulmonary artery. Tip of the intra-aortic balloon pump projects over the expected location of the aortic arch, similar to prior. Partially visualized VA ECMO  cannulation with distal tip overlying T7 vertebral body, similar to prior.   CARDIOMEDIASTINAL SILHOUETTE: Enlarged cardiomediastinal silhouette is stable in size and configuration when compared to prior.   LUNGS: Asymmetrical elevation of the right hemidiaphragm relative to the left. Similar mild right perihilar streaky opacity favored to represent atelectasis or consolidation. No sizable pleural effusion or significant pneumothorax.   BONES: No acute osseous abnormality.       1. Medical devices described above. 2. Similar mild right perihilar streaky opacity favored to represent atelectasis or consolidation. 3. Stable cardiomegaly.   I personally reviewed the images/study and I agree with the findings as stated by Heather Lopez DO, PGY-2. This study was interpreted at [place], [place].   Signed by: Duarte Guillory 2/20/2024 3:53 PM Dictation workstation:   [id number]    Cardiac catheterization - non-coronary    Result Date: 2/20/2024   [facility], Cath Lab, [address] [place] [address] Cardiovascular Catheterization Report Patient Name:      MIGUEL BASS Performing Physician:  [id number] Wright MD Study Date:        2/20/2024             Verifying Physician:   Emily Wright MD MRN/PID:           [id number]              Cardiologist/Co-scrub: Accession#:        [id number]          Ordering Physician:    [id number] J BORGES Date of Birth/Age: 1990 / 33 years   Fellow: Gender:            F                     Fellow: Encounter#:        [id number]  Study:            Endomyocardial Biopsy Additional Study: Other  Indications: Procedure performed to evaluate a recipient of a cardiac transplant.  Procedure Description Comments: Patient arrived to lab with a 9 Fr right IJ sheath. I attempted to use  this sheath to perform biopsy but due to ECMO cannula in right atrium I was unable to advance it appropriately. I replaced the 9 Fr sheath with a long braided sheath, and advanced the tip of this sheath to the mid right ventricle. Through this I obtained biopsy samples. I then replaced the long sheath with a standard 9 Fr sheath and floated a swan for use in the CTICU.  Hemo Personnel: +-------------------+---------+ Name               Duty      +-------------------+---------+ Lexie Wright MD 1 +-------------------+---------+  Hemodynamic Pressures:  +----+---------------------+----------+-------------+--------------+---------+ Site      Date Time      Phase NameSystolic mmHgDiastolic mmHgMean mmHg +----+---------------------+----------+-------------+--------------+---------+  Art2/20/2024 12:09:29 PM      Rest          123            75       94 +----+---------------------+----------+-------------+--------------+---------+  Oxygen Saturation %: +-----------+----------+------------+ Sample SiteO2 Sat (%)HB (g/100ml) +-----------+----------+------------+          FA        99         7.9 +-----------+----------+------------+          PA        75         7.9 +-----------+----------+------------+  Cardiac Outputs: +---------------+------------------+-------+ LISA CO (l/min)LISA CI (l/min/m2)LISA SV +---------------+------------------+-------+             9.7               4.8   84.0 +---------------+------------------+-------+  Complications: No in-lab complications observed.  Cardiac Cath Post Procedure Notes: Post Procedure           Heart transplant rejection. Diagnosis: Blood Loss:              Estimated blood loss during the procedure was Minimal                          mls. Specimens Removed:       Number of specimen(s) removed: 4. ____________________________________________________________________________________ CONCLUSIONS:  1. Endomyocardial biopsy  performed and 4 samples sent to Pathology.  2. New swan placed in right IJ for ICU use. ICD 10 Codes: Heart transplant rejection-T86.21  CPT Codes: Moderate Sedation Services 2nd additional 15 minutes patient >5 years-23802; Moderate Sedation Services 1st additional 15 minutes patient >5 years-65007; Moderate Sedation Services 3rd additional 15 minutes patient >5 years-76170; Endomyocardial Biopsy-77738  [id number] Lexie Wright MD Performing Physician Electronically signed by [id number] Lexie Wright MD on 2/20/2024 at 3:23:48 PM  ** Final **          Assessment/Plan   Charla Bowles is a 33 yo heart transplant recpient (3/2023) who presented on 2/15/24 with signs of acute cellular rejection on heart biopsy 2/16  and multiorgan dysfunction in the setting of ADHF. Multiple pressors started on admission. IABP started on 2/18.  Was on VA-ECMO 2/19-  , the impella 5.5 currently on P8. She has received pulse steroids, was maintained on tacrolimus/sirolimus, 2 sessions of IVIG/plasmapheresis on 2/18, 2/20 and 2 doses of Thymoglobulin 2/18 and 2/19.  Following an endomyocardial biopsy 2/20/2024 which was negative for rejection (in the setting of negative DSAs) - the Thymoglobulin/IVIG/plasmapheresis sessions were stopped. Then treated again with 5 treatments of IVIG/plasmapheresis from 3/5/24. We are continuing to follow up for symptom management and support; and eventual GOC but will hold off till next week, hoping she can improve.      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 possible SLE cardiomyopathy)  #CHF with severely reduced EF s/p ICD  #Acute on chronic CHF requiring inotropic support  #Pulmonary HTN  #SLE  #Hypothyroidism  #Nausea  #Hemorrhoids     #Acute Back Pain   - Pain r/t incisional site of Impella, positioning   -Continue Dilaudid 0.2 mg iv q3 PRN. Pt has received 4 doses in the past 24 hours (Total OME = 10)     #Constipation- severe, with resultant  nausea  -Improving, last BM 3/20/24  -Recommend Senna BID and Miralax 17g daily  -Recommend continuing olanzapine 2.5 mg bid PRN for nausea     #psychosocial support  - Music therapy  - Spiritual care support        Thank you for allowing us to care for this patient. Palliative Team will continue to follow as needed. Please contact team with any questions or concerns.     JIMMY Johnson-CNP   Team pager [phone] (weekdays)    I spent 60 minutes in the care of this patient which included chart review, interviewing patient/family, discussion with primary team, coordination of care, and documentation.    Medical Decision Making was high level due to high complexity of problems, extensive data review, and high risk of management/treatment.

## 2024-03-22 NOTE — PROGRESS NOTES
Occupational Therapy                 Therapy Communication Note    Patient Name: Charla Bowles  MRN: [id number]  Today's Date: 3/22/2024     Discipline: Occupational Therapy    Missed Visit Reason: Missed Visit Reason:  (patient politely declined OT at this time as reporting having an upset stomach and that she will be getting put on dialysis soon; will attempt OT next visit as appropriate)    Missed Time: Attempt    Comment:  July Araujo OTR/L  Inpatient Occupational Therapist   Rehab Office: [phone]
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
## 2024-03-22 NOTE — CONSULTS
Chief Complaint:     History of Present Illness:  33 year old female with past medical history of lupus, HTN, anxiety, obesity, GERD, and heart transplant in March 2022 with postoperative course complicated by upper extremity/internal jugular DVTs, and asymptomatic rejection in November 2022. She was admitted to HFICU on 2/15 with concern for cardiogenic shock. Ophthalmology consulted for blurred vision. Patient reports that she has had blurred vision for about 3 months. States that she lost her prescription glasses and has noticed worsening of the blurred vision with changes in her medications. She also endorses floaters for the past couple of days. She denies flashes. She also endorses eye pain or pressure from straining.  Patient denies pain, redness, discharge, double vision, blind spots, flashes.    Past Ocular History: refractive error    Past Medical History: please refer to admission HPI    Family History: negative for glaucoma, AMD, and blindness    Medications: please refer to medication reconciliation    Allergies: please refer to patient allergy list        Examination:  Base Eye Exam       Visual Acuity (Snellen - Linear)         Right Left    Near sc 20/20-1 20/20              Tonometry (Tonopen, 12:43 PM)         Right Left    Pressure 14 15              Pupils         Dark Light Shape React APD    Right 3 2 Round Brisk None    Left 3 2 Round Brisk None              Visual Fields (Counting fingers)         Left Right     Full Full              Extraocular Movement         Right Left     Full, Ortho Full, Ortho              Neuro/Psych       Oriented x3: Yes              Dilation       Both eyes: 1% Mydriacyl & 2.5% Best  @ 12:44 PM                  Additional Tests       Color         Right Left    Ish[place]ara 10.5/11 11/11                  Slit Lamp and Fundus Exam       External Exam         Right Left    External Normal Normal              Slit Lamp Exam         Right Left    Lids/Lashes Normal Normal     Conjunctiva/Sclera White and quiet White and quiet    Cornea Clear Clear    Anterior Chamber Deep and quiet Deep and quiet    Iris Round and reactive Round and reactive    Lens trace posterior subcapsular cataract trace posterior subcapsular cataract              Fundus Exam         Right Left    Vitreous Clear Clear    Disc Pink and sharp Pink and sharp    C/D Ratio .15 .15    Macula Normal Normal    Vessels Normal Normal    Periphery Normal Normal                    Impression and Recommendations:  # blurred vision, both eyes    33 year old female with past medical history of lupus, HTN, anxiety, obesity, GERD, transplant recipient, and chronic steroid user who endorses several months of blurred vision. On exam, visual acuity is good, intraocular pressure (IOP) is wnl, and pupils are reactive. SLE notable for early cataracts. DFE was unremarkable. Patient is inpatient pending kidney and heart transplant. Suspect that patient needs updated prescription and that eye pain and pressure are from asthenopia.    - Please keep scheduled appointment for 7/22/24. Can change appointment if patient is discharged prior or after current appointment date.  - Contact ophthalmology when patient is close to discharge to help arrange follow up appointment.    Please contact the ophthalmology service for further questions/comments.  Note not final until signed by attending.  Obdulia Titus MD PhD  Ophthalmology PGY3  [id number] (adult)/[id number] (peds)  To schedule appointment for adult patients: [phone]  To schedule appointments for pediatric patients: [phone]

## 2024-03-22 NOTE — PROGRESS NOTES
[facility]  HFICU PROGRESS NOTE    Charla Bowles/[id number]    Admit Date: 2/15/2024  Hospital Length of Stay: 36   ICU Length of Stay: 11d 18h   Primary Service: HFICU  Primary HF Cardiologist: Dr. Cornell  Referring: Dr Cornell     INTERVAL EVENTS / PERTINENT ROS:     Yesterday patient lactate adam with what seemed like higher epinephrine dosing and lasix challenge. Epi was decreased, impella speed was increased to P7, and lasix was stopped. Lactate by morning returned to normal. Patient requesting to see ophthalmology for vision changes which was ordered yesterday afternoon. Patients labs still showing signs of hemolysis so will have limited echo done per Dr Ferrari request to see if repositioning is possible.     Plan:  Dialysis session today  Ophthalmology to see patient per her request   Limited ECHO     MEDICATIONS  Infusions:  EPINEPHrine, Last Rate: 0.02 mcg/kg/min (03/22/24 0800)  furosemide, Last Rate: Stopped (03/21/24 1600)  heparin Impella Purge 25 units/mL in 500 mL D5W, Last Rate: 10 mL/hr (03/22/24 0800)  milrinone, Last Rate: Stopped (03/19/24 0900)      Scheduled:  [Held by provider] aspirin, 81 mg, Daily  calcitriol, 0.5 mcg, Daily  darbepoetin floyd, 100 mcg, q14 days  ferrous sulfate (325 mg ferrous sulfate), 65 mg of iron, Daily with breakfast  folic acid, 1 mg, Daily  insulin lispro, 0-10 Units, Before meals & nightly  levothyroxine, 200 mcg, Daily  lidocaine, 1 patch, Daily  melatonin, 10 mg, Nightly  multivitamin with minerals, 1 tablet, Daily  mycophenolate, 360 mg, BID  nystatin, 5 mL, q6h  pantoprazole, 40 mg, Daily before breakfast  perflutren lipid microspheres, 0.5-10 mL of dilution, Once in imaging  predniSONE, 10 mg, Daily  [Held by provider] rosuvastatin, 40 mg, Nightly  sennosides-docusate sodium, 1 tablet, BID  [Held by provider] sirolimus, 3 mg, Daily  [Held by provider] sulfamethoxazole-trimethoprim, 80 mg of trimethoprim,  "Daily  tacrolimus, 2.5 mg, q12h TRICIA  traZODone, 50 mg, Nightly  valGANciclovir, 450 mg, q48h      PRN:  acetaminophen, 975 mg, q8h PRN  benzocaine-menthol, 1 lozenge, q2h PRN  dextrose, 25 g, q15 min PRN  dextrose, 25 g, q15 min PRN   Or  glucagon, 1 mg, q15 min PRN  diphenhydrAMINE, 25 mg, q5 min PRN  glucagon, 1 mg, q15 min PRN  guaiFENesin, 600 mg, BID PRN  HYDROmorphone, 0.2 mg, q2h PRN  artificial tears, 2 drop, PRN  microfibrllar collagen, , PRN  mupirocin, , BID PRN  OLANZapine, 2.5 mg, BID PRN  ondansetron, 4 mg, q4h PRN  oxyCODONE, 5 mg, q4h PRN  polyethylene glycol, 17 g, Daily PRN  sodium chloride, 1 spray, 4x daily PRN  traZODone, 25 mg, Nightly PRN        Impella:      Most Recent Range Past 24hrs   Performance Level 7 P Level  Min: 5   Min taken time: 03/21/24 1400  Max: 7   Max taken time: 03/22/24 0900   Flow (L/min) 3.8 Flow (L/min)  Min: 2.2   Min taken time: 03/21/24 1300  Max: 4.1   Max taken time: 03/22/24 0600   Motor Current 400/350 Motor Current  Min: 269/195   Min taken time: 03/21/24 1300  Max: 441/329   Max taken time: 03/22/24 0500   Placement Signal Yes  Placement OK could not be evaluated. This SmartLink does not work with rows of the type: Custom List   Purge (mmHg) 412 Purge Pressure (mmHg)  Min: 404   Min taken time: 03/22/24 0300  Max: 618   Max taken time: 03/21/24 1700   Purge rate (mL/hr) 12.6 Purge Rate (mL/hr)  Min: 11   Min taken time: 03/21/24 1800  Max: 12.6   Max taken time: 03/22/24 0900       PHYSICAL EXAM:   Visit Vitals  BP 91/78 (BP Location: Left arm, Patient Position: Lying)   Pulse (!) 114   Temp 36.3 °C (97.3 °F)   Resp (!) 28   Ht 1.549 m (5' 0.98\")   Wt 98 kg (216 lb 0.8 oz)   SpO2 97%   BMI 40.84 kg/m²   OB Status Hysterectomy   Smoking Status Never   BSA 2.05 m²     Wt Readings from Last 5 Encounters:   03/22/24 98 kg (216 lb 0.8 oz)   12/07/23 92.1 kg (203 lb)   12/01/23 93 kg (205 lb)   11/29/23 92.9 kg (204 lb 12.8 oz)   11/09/23 91.3 kg (201 lb 3.2 oz) "     INTAKE/OUTPUT:  I/O last 3 completed shifts:  In: 1393.8 (14 mL/kg) [P.O.:600; I.V.:693.8 (7 mL/kg); IV Piggyback:100]  Out: 3010 (30.2 mL/kg) [Urine:10 (0 mL/kg/hr); Other:3000]  Dosing Weight: 99.6 kg        Physical Exam  Constitutional:       General: She is not in acute distress.     Appearance: Normal appearance. She is not ill-appearing.   HENT:      Head: Normocephalic.      Mouth/Throat:      Mouth: Mucous membranes are moist.   Eyes:      Extraocular Movements: Extraocular movements intact.      Pupils: Pupils are equal, round, and reactive to light.   Neck:      Comments: RIJ dialysis line present   Cardiovascular:      Rate and Rhythm: Regular rhythm. Tachycardia present.      Pulses: Normal pulses.      Heart sounds: Normal heart sounds.   Pulmonary:      Effort: Pulmonary effort is normal. No respiratory distress.      Breath sounds: Normal breath sounds.   Chest:      Comments: Right axillary impella site CDI   Abdominal:      General: Bowel sounds are normal.      Palpations: Abdomen is soft.      Tenderness: There is no abdominal tenderness.   Musculoskeletal:         General: Normal range of motion.      Cervical back: Normal range of motion.      Right lower leg: No edema.      Left lower leg: No edema.   Skin:     General: Skin is warm.      Capillary Refill: Capillary refill takes 2 to 3 seconds.      Comments: R femoral SGC + cordis CDI site c/d/I line now removed     Left groin ECMO cannulation site with drainage    Neurological:      General: No focal deficit present.      Mental Status: She is alert and oriented to person, place, and time.   Psychiatric:         Behavior: Behavior normal. Behavior is cooperative.       DATA:  CMP:  Results from last 7 days   Lab Units 03/22/24  0609 03/21/24  0609 03/20/24  0607 03/19/24  0605 03/18/24  1244 03/18/24  0315 03/17/24  1533 03/17/24  0025 03/16/24  1433 03/16/24  0813 03/16/24  0528 03/15/24  1217   SODIUM mmol/L 129* 134* 130* 126*  --   128* 128* 130* 125* 129* 131* 127*   POTASSIUM mmol/L 4.5 4.2 3.8 4.2  --  3.9 4.2 3.5 3.5 2.7* 2.6* 3.8   CHLORIDE mmol/L 94* 98 96* 92*  --  94* 92* 93* 90* 93* 94* 92*   CO2 mmol/L 21 23 23 23  --  25 24 28 25 25 26 25   ANION GAP mmol/L 19 17 15 15  --  13 16 13 14 14 14 14   BUN mg/dL 25* 14 17 26*  --  13 8 2* 9 5* 3* 14   CREATININE mg/dL 3.07* 1.98* 2.38* 3.21*  --  1.82* 1.21* 0.31* 1.27* 0.75 0.51 1.99*   EGFR mL/min/1.73m*2 20* 34* 27* 19*  --  37* 61 >90 57* >90 >90 33*   MAGNESIUM mg/dL 2.12 2.11 2.21 2.53*  --  2.49* 1.90 1.87 1.96  --  2.11 3.04*   ALBUMIN g/dL 3.4 3.5 3.4 3.3*  --  3.5 3.4 3.4 3.3* 3.2* 3.3* 3.3*   ALT U/L 86* 59* 52* 53*  --  70* 71* 84* 79* 80* 82* 60*   AST U/L 192* 102* 83* 103*  --  175* 213* 295* 323* 343* 340* 259*   BILIRUBIN TOTAL mg/dL 2.8* 1.8* 1.5* 1.6*  --  2.2* 2.6* 3.6* 4.2* 3.9* 3.4* 2.9*   LIPASE U/L  --   --   --   --  46  --  93*  --   --   --   --   --        CBC:  Results from last 7 days   Lab Units 03/22/24  0609 03/21/24  0609 03/20/24  0607 03/19/24  0605 03/18/24  0315 03/17/24  1444 03/17/24  0025 03/16/24  1433   WBC AUTO x10*3/uL 8.2 7.2 7.6 6.3 7.4 8.9 7.4 7.7   HEMOGLOBIN g/dL 8.3* 8.1* 7.7* 7.5* 7.5* 7.7* 8.6* 8.1*   HEMATOCRIT % 25.8* 24.9* 24.5* 22.4* 22.5* 23.5* 24.1* 23.1*   PLATELETS AUTO x10*3/uL 51* 44* 58* 68* 58* 99* 62* 68*   MCV fL 93 94 98 90 94 95 87 89       COAG:   Results from last 7 days   Lab Units 03/22/24  0609 03/21/24  0609 03/20/24  0607 03/19/24  0605 03/18/24  0315 03/17/24  0025 03/16/24  1433 03/16/24  0528   INR  2.0* 1.8* 1.7* 1.9* 2.1* 2.2* 2.2* 2.1*       ABO:   ABO TYPE   Date Value Ref Range Status   03/21/2024 O  Final         HEME/ENDO:  Results from last 7 days   Lab Units 03/18/24  0315 03/17/24  1533   TSH mIU/L 20.74* 15.88*          CARDIAC:   Results from last 7 days   Lab Units 03/22/24  0609 03/21/24  0609 03/20/24  0607 03/19/24  0605 03/18/24  0315 03/17/24  0025 03/16/24  1433 03/16/24  0813   LD U/L 1,126*  958* 1,005* 1,105* 1,543* 2,111* 2,170* 2,167*         ASSESSMENT AND PLAN:   Charla Bowles is a 34 y/o F with PMHx of heart transplant in March 2022 with postoperative course c/b upper extremity/internal jugular DVTs, and 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 She was admitted to HFICU on 2/15 with concern for cardiogenic shock 2/2 allograft rejection and decompensated heart failure with multiorgan dysfunction including significant elevation of liver enzymes and nonoliguric acute kidney injury. Endomyocardial biopsy on 2/16 revealed mild ACR with +CD4s and negative HLAs, however multisystem organ failure persisted with increased inotropic requirements. IABP placed on 2/18. Transferred to CTICU on 2/19 for [facility] ECMO cannulation with Dr. Marina for persistent multi-organ system dysfunction. Intubated 2/21 for respiratory failure 2/2 pulmonary edema, extubated 2/24. ECMO de-cannulated 2/29/24 but had worsening HIRAM requiring CRRT and overall declined clinical status and IABP was transitioned to R axillary impella 5.5 on 3/1. Transferred from CTICU to HFICU on 3/10 for further management. Continued on milrinone gtt @ 0.25 mcg/kg/min and impella support decreased to P-level 5 on 3/11. Completed PLEX/IVIG on 3/13. Went for RHC 3/13: RA: 16, RV: 43/1, PA: 43/12, PCWP: 23, PA-SAT: 47%, CO: 5.14, CI: 2.64 on P5 of Impella 5.5 and milrinone 0.25 mcg/kg/min. Impella was increased to P6 shortly after, and overnight 3/14 patient's mixed venous dropped from 58->32, CXR was ordered and SGC was in appropriate position. Repeat mixed venous confirmed low value of 37. Impella increased to P8 and stat ECHO ordered 3/14. WBC continued to down trend 3/14 which prompted new infectious workup to be sent, and patient to be started on broad spectrum abx. Patient transitioned to tablo from iHD for better volume removal given elevated CVPs 3/14. 03/15: Malpositioned Impella adjusted at the bedside under echo by Dr. Marina and Dr. Melo.  Impella pulled back ~ 1 - 2 cm and confirmed placement, pushed back in 1 cm in the evening by Dr Lewis for placement alarms. [place] removed 3/16 and P level decreased to 6 due to continued high hemolysis. 03/17: LDH has plateaued @ 2111. CT scan chest, abd, pelvis complete w/o acute ABD. 03/18: Impella purge solution transitioned from sodium bicarb to heparin solution. 03/19: Milrinone gtt discontinued and transitioned to Epinephrine gtt to improve blood pressures. Initiated Myfortic low dose BID, Discontinued Sirolimus, Increased Tacrolimus to 2 mg BID (goal: 8 - 10). Received dialysis with SLED 3/19 and 3/20. Trialed diuretic challenge 3/21 which was unsuccessful. P level increased to 7 on 3/21, plan for limited echo today.       NEURO:   #Acute Pain   #Insomnia  - Continue tylenol 975 mg Q8 PRN for mild pain   - Continue oxycodone 5 mg Q4 PRN for moderate pain   - Continue Diluadid 0.2 mg q3 PRN for severe pain   - Lidocaine patch PRN   - Continue melatonin 10 mg nightly   - Continue trazodone 50 mg nightly for insomnia  - PT/OT   - CAM ICU score q shift  - Sleep/wake cycle hygiene  - Serial neuro and pain assessments     ENT:  #Epistaxis (resolved)  - Significant epistaxis 2/28 and 3/3 requiring ENT consult, packing removed by ENT 3/5  - S/p ocean spray 5x day x10 days completed   - Ocean spray and mupiricin PRN for dry nasal passages     CARDIAC:  #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)  Toxo: -/-   Hep C: -/-     Rejection/Prophylaxis (transplant):  - C/w Steroids: 10 mg PO prednisone daily  - Increase Tacrolimus: 2.5 mg PO @ 0630 & 2.5 mg PO @ 1830 w/ daily levels drawn @ 0600 (last change 3/21)  - Sirolimus discontinued 3/19 (stopped 3/11)   - C/w Myfortic 360 mg BID (Started 3/19, increased 3/21)  - Tacrolimus goal troughs: 8 - 10 (Goal Changed 03/19) , daily level 3/22: 7.9  - Antifungals: nystatin oral suspension 5 mls q6  -  Antivirals: Valcyte 450 mg q48 due to low grade viremia   - HOLDING Anti PCP & Toxoplasmosis: Bactrim SS daily --> holding due to thrombocytopenia (2/23)     Last cardiac biopsy: 2/16/24 with ACR1 and no AMR  Last HLA (2/16/24): negative for DSAs   Last RHC (3/13/24): RA: 16, RV: 43/1, PA: 43/12, PCWP: 23, PA-SAT: 47%, CO: 5.14, CI: 2.64 on P5 of Impella 5.5 and milrinone 0.25 mcg/kg/min   Last LHC (2/18/24): negative for CAV and CAD   Last TTE/BOB (3/1/24): shows LVEF to 30% and mild RV dysfunction (intra-op impella 5.5 insert)  Osteopenia/osteoporosis prophylaxis: Vitamin D3 and calcium supplements  Peptic/gastric ulcer prophylaxis: Pantoprazole 40 mg daily   CAV Prophylaxis: HOLDING Aspirin 81 mg daily 2/2 thrombocytopenia & HOLDING rosuvastatin 40 mg nightly 2/2 transaminitis     - Unclear cause of acute severe graft dysfunction. Most likely smoldering ACR vs. AMR; however, 2x allograft biopsies on 2/16 & 2/20 remain negative for any significant pathology. Notably, both biopsies taken after rejection therapies implemented which may have reduced areas of graft damage.    - DSAs remain negative; however, patient may have non-HLA antibodies present. 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   - Negative CAV & CAD via LHC on 2/18/24   - Completed methylpred steroid pulse w/ 1g q24 x 3 days (2/16-2/18) and prednisone 10 mg daily   - Thymoglobulin doses: 2/18 & 2/19   - IVIG + PLEX Sessions completed: 2/18, 2/20, 3/7, 3/11, 3/13 - completed   - Graft function slightly worse after transition to impella 5.5 on 3/1. 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  #Impella 5.5 support (3/1/24)  RHC (3/13/24): RA: 16, RV: 43/1, PA: 43/12, PCWP: 23, PA-SAT: 47%, CO: 5.14, CI: 2.64 on P5 of Impella 5.5, milrinone 0.25 mcg/kg/min, hydralazine 100 mg q8, isordil 40 mg q8  Opening SGC  #s (3/13): BP 94/71 (79), PAP 42/30 (35), CVP 13, , CO/CI (kyra) 5.46/2.80, SVO2 56% on P5 of Impella 5.5, milrinone 0.25 mcg/kg/min, hydralazine 100 mg q8, isordil 40 mg q8  Daily/ Closing SGC #s (3/16): /86 (97), CVP 20, PAP 35/25 (42), SVR 1115, CO/CI (kyra) 5.5/2.8, SVO2 51% on P7 of Impella 5.5, milrinone 0.25 mcg/kg/min, hydralazine 50 mg q8, isordil 20 mg q8  - Maintain goal MAP 70-90  - Impella P level maintaining at P7, wean per clinical picture / hemodynamics   - Transitioned to Heparin purge for Impella per Dr. Wright (03/19) -> monitor ACT Q4 hours   - Discontinue milrinone gtt 0.25 mcg/kg/min (03/19)  - C/w Epinephrine gtt for improved blood pressure to increase fluid removal (03/19)  - Discontinued afterload reduction with PO hydralazine 50 mg q8 + PO isosorbide 20 mg q8 2/2 hypotension    - Continue ASA - Currently HELD 2/2 low platelet count  - Continue to hold rosuvastatin 40 mg daily until transaminases normalize - improving daily   - S/p Digoxin 125 mcg PO (3/12, 3/13), digoxin level (3/14): 0.71 - currently discontinued - will readdress for RV protection / HR  - STAT ECHO completed 03/14: LV systolic function is severely decreased with a 25-30% estimated ejection fraction; There is mildly reduced right ventricular systolic function; Moderate mitral valve regurgitation; Moderate to severe tricuspid regurgitation visualized; There is global hypokinesis of the left ventricle with minor regional variations.  - Limited ECHO ordered for Impella placement 3/22    #Lactic acidosis (resolved)  - Slightly elevated lactate level 3/21 at 2.4, peaked at 3.6 now normalized at 1.8 today 3/22  - Continue P level at 7  - Recheck in afternoon     #Advanced therapy evaluation  - Presented to committee 3/12/2024 - concern for end organ failure (possible heart / kidney)   - Not a candidate for transplant at this time per committee discussion 3/12/24  - Discussed in selection committee meeting 03/19 -    (1) likelihood of cardiac recovery s/p graft failure seems less likely as time goes by  (2) she may be a candidate for re-do cardiac transplantation, but she has high risk features (kidney failure, congestion, prior failure of immunosuppression)  (3) per UNOS guidelines she would not be a candidate for combined H/K listing until 6 weeks have passed since kidney failure episode started.      PULM:   #Acute Hypoxic Respiratory Failure 2/2 pulmonary edema (resolved)  ETT (2/21-2/24)  Remains on RA   - Maintain SpO2 > 92%     GI:   #Nausea  #Constipation   #Diarrhea - resolved   #Emesis (dark blood - clots) - resolved   #Right sided flank pain - improved   - Repeat CT chest/abd/pelvis w/o contrast (3/17): no acute abdomen    - C/w Zofran PRN   - C/w Olanzapine 2.5 mg IM TID PRN   - Resume Patti-Colace PRN / Miralax PRN (no BM x 24 hours)   - Patient had emesis x1 with dark blood in vomit -> GI consult placed 3/17 - signed off (protonix gtt for 3 days, d/tri on 3/20)  - Sent stool for C-Diff -> Negative (03/17)    #Hx of gastric bypass   #Hx of MMF colitis  #Acute transaminitis   - Continue home PPI, calcitriol 0.5 mg daily, multivitamin  - Continue PRN miralax prn & patti-colace   - Trend LFTs daily - improving 03/18  - Monitor BMs/diarrhea with initiation of Myfortic      :   #Acute Renal Failure 2/2 to cardiorenal syndrome (on IHD)  Baseline Cr 1.2-1.3 - now aneuric   CVVH stopped 2/27  - RFP daily and PRN  - Tablo (completed 03/15, 03/16, & 03/17) / SLED / CVVH per daily assessment - SLED 03/19 and 3/20 per nephrology    - Trialed diuretic challenge Lasix 100 mg x1 and lasix gtt @ 20 -> no urine   - Dialysis today 3/22   - Transplant nephrology following closely, appreciate assistance   - Per Dr. Wright, every day fluid removal - route to be determined by nephrology      ENDO:   #T2DM  Steroid induced hyperglycemia acceptable glycemic control on SSI  - Maintain BG <180 with hypoglycemia protocol  - Continue  SSI     #Hypothyroidism  TSH (3/17): 20.74, T4 1.11, T3: 1.4    - Continue synthroid 200 mcg daily   - Endocrine following, appreciate assistance      HEME:   #Acute DVT LIJ and SVT left cephalic vein and right cephalic vein   #Iron deficiency anemia  #Acute blood loss anemia   #Multiple transfusions   #Hemolysis - resolving slowly   UE and LE Venous duplex (3/6/24): right acute occlusive superficial venous thrombosis visualized in the mid cephalic and acute occlusive superficial venous thrombosis visualized in the distal cephalic veins, left acute non-occlusive deep vein thrombosis visualized in the internal jugular and acute non-occlusive superficial venous thrombosis visualized in the mid cephalic veins   UE and LE Venous duplex (3/12): unchanged from prior  Last type and screen: 3/15  - 03/15: Transfused 1 unit leukocyte reduced PRBC's   - 03/16: Transfused 1 unit leukocyte reduced PRBC's   - Heparin impella purge check ACT Q4 hours   - ASA on hold d/t low platelet count   - Continue SCDs for DVT prophylaxis  - Continue Aranesp every 2 weeks  - Vascular medicine signed off 3/13, will need discussion regarding long term anticoagulation closer to discharge   - Consulted hematology regarding DIC / ALBERT: unlikely per hematology (03/17)  - Per hematology, trend daily T/D-bilirubin, LDH, haptoglobin, reticulocyte count, fibrinogen, PT/PTT/INR, D-dimer.  - [id number] sent per heme recs: 43- the tacrolimus level has been adjusted recently and there may be a tacro-induced TMA.     ID:   #Leukopenia, likely in the setting of IVIG vs infectious process (resolved)  Blood cultures (2/15): NGTD  Afebrile, nontoxic  - Sent infectious workup: blood cultures + respiratory culture - No Growth final report 03/19  - Stopped Broad spectrum abx 03/17: vancomycin + zosyn (3/14-3/17)  - Follow lactate: 03/21: 2.4 -> recheck in afternoon  - Left groin ECMO cannulation site - wound draining - wound care following appreciate recs - cultures  sent - No growth   - Diflucan 200 mg x1 for yeast infection concern   - Trend temp q4h     PHYSICAL AND OCCUPATIONAL THERAPY: ordered and following     LINES:  PIVs  RIJ trialysis catheter 3/5 (discussed line in rounds this AM as patient has no other access)   R Axillary Impella 3/1    DVT: SCDs  VAP BUNDLE: N/A  ULCER PPX: PPI - pantoprazole gtt until 03/20 @ 1900   GLYCEMIC CONTROL: SSI  BOWEL CARE: Patti-colace, miralax    INDWELLING CATHETER: NA  NUTRITION: Adult diet Regular; 1500 mL fluid      EMERGENCY CONTACT: Extended Emergency Contact Information  Primary Emergency Contact: SAHIL DIMAS  Address: [address] [place] [address]  Home Phone: [phone]  Mobile Phone: [phone]  Relation: Mother  FAMILY UPDATE: given at bedside  CODE STATUS: Full Code  DISPO: remain in HFICU    Patient seen and assessed with Dr. Wright   _________________________________________________  JIMMY Musa-CNP

## 2024-03-23 PROBLEM — R10.9 ABDOMINAL PAIN: Status: ACTIVE | Noted: 2024-01-01
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
## 2024-03-23 NOTE — PROGRESS NOTES
HFICU Attending Note    Principal Problem:    Cardiogenic shock (CMS/[facility])  Active Problems:    Heart transplant recipient (CMS/[facility])    ESRD (end stage renal disease) on dialysis (CMS/[facility])    HIRAM (acute kidney injury) (CMS/[facility])    Acute passive congestion of liver    Hyponatremia    Iron deficiency anemia    Abdominal pain    Cardiac transplant rejection (CMS/[facility])    Acute combined systolic (congestive) and diastolic (congestive) heart failure (CMS/[facility])    Her primary concern today is recurrent generalized abdominal pain, she attributes this to Myfortic. On exam her abdomen is soft, but tender.    Of note since we increased impella support to P7, her LDH has risen again suggesting more hemolysis.    Plan:   -- Reduce from P7 to P6 due to hemolysis (lower levels of impella support yielded rise in lactate)  -- CVP is 13, hold off on dialysis today  -- Stop Myfortic tonight to see if this gives her relief from abdominal pain. In setting of her prior MMF colitis re-consult GI service. Could this recur so quickly as 3-4 days after starting Myfortic (this is a different formulation of CellCept that she was on before)    This critically ill patient continues to be at-risk for clinically significant deterioration / failure due to the above mentioned dysfunctional, unstable organ systems.  I have personally identified and managed all complex critical care issues to prevent aforementioned clinical deterioration.  Critical care time is spent at bedside and/or the immediate area and has included, but is not limited to, the review of diagnostic tests, labs, radiographs, serial assessments of hemodynamics, respiratory status, ventilatory management, and family updates.  Time spent in procedures and teaching are reported separately.    Critical care time: __45__ minutes     Lexie Wright MD, MPH  Advanced Heart Failure and Transplant Cardiology  [facility]  [facility]

## 2024-03-23 NOTE — PROGRESS NOTES
[facility]  HFICU PROGRESS NOTE    Charla Bowles/[id number]    Admit Date: 2/15/2024  Hospital Length of Stay: 37   ICU Length of Stay: 12d 18h   Primary Service: HFICU  Primary HF Cardiologist: Dr. Cornell  Referring: Dr Cornell     INTERVAL EVENTS / PERTINENT ROS:   Limited echo completed yesterday and per Dr. Bright impella did not need repositioned. Labs this AM are concerning for hemolysis, LDH 1126->1677, total bilirubin 2.8-> 3.2. LTFs also up-trending. CVP 13 this AM after 3 L was removed last night with SLED. Remains hemodynamically supported on impella P7 and epinephrine 0.02 mcg/kg/min. Patient complaining of worsening abdominal pain this morning and believes it is because of her myfortic.     Plan:  - Will hold on dialysis today, plan for CRRT tomorrow 3/24   - Will hold myfortic dose tonight  - Re-engage GI per patient request  - Wean Impella to P6, will re-check LDH and lactate this afternoon   - Tacrolimus level (3/23): 11.6, will decrease tacrolimus dose to 2.5 mg 0630 + 2 mg 1830    MEDICATIONS  Infusions:  EPINEPHrine, Last Rate: 0.02 mcg/kg/min (03/23/24 0800)  heparin Impella Purge 25 units/mL in 500 mL D5W, Last Rate: 10 mL/hr (03/23/24 0800)      Scheduled:  [Held by provider] aspirin, 81 mg, Daily  calcitriol, 0.5 mcg, Daily  darbepoetin floyd, 100 mcg, q14 days  ferrous sulfate (325 mg ferrous sulfate), 65 mg of iron, Daily with breakfast  folic acid, 1 mg, Daily  insulin lispro, 0-10 Units, Before meals & nightly  levothyroxine, 200 mcg, Daily  lidocaine, 1 patch, Daily  melatonin, 10 mg, Nightly  multivitamin with minerals, 1 tablet, Daily  [Held by provider] mycophenolate, 180 mg, BID  nystatin, 5 mL, q6h  pantoprazole, 40 mg, Daily before breakfast  perflutren lipid microspheres, 0.5-10 mL of dilution, Once in imaging  potassium chloride, 20 mEq, Once  predniSONE, 10 mg, Daily  [Held by provider] rosuvastatin, 40 mg, Nightly  sennosides-docusate  "sodium, 1 tablet, BID  [Held by provider] sulfamethoxazole-trimethoprim, 80 mg of trimethoprim, Daily  tacrolimus, 2 mg, q24h  [START ON 3/24/2024] tacrolimus, 2.5 mg, q24h  traZODone, 50 mg, Nightly  valGANciclovir, 450 mg, q48h      PRN:  acetaminophen, 975 mg, q8h PRN  benzocaine-menthol, 1 lozenge, q2h PRN  dextrose, 25 g, q15 min PRN  dextrose, 25 g, q15 min PRN   Or  glucagon, 1 mg, q15 min PRN  diphenhydrAMINE, 25 mg, q5 min PRN  glucagon, 1 mg, q15 min PRN  guaiFENesin, 600 mg, BID PRN  HYDROmorphone, 0.2 mg, q2h PRN  artificial tears, 2 drop, PRN  microfibrllar collagen, , PRN  mupirocin, , BID PRN  OLANZapine, 2.5 mg, BID PRN  ondansetron, 4 mg, q4h PRN  oxyCODONE, 5 mg, q4h PRN  polyethylene glycol, 17 g, Daily PRN  sodium chloride, 1 spray, 4x daily PRN        Impella:      Most Recent Range Past 24hrs   Performance Level 7 P Level  Min: 7   Min taken time: 03/23/24 0600  Max: 7   Max taken time: 03/23/24 0600   Flow (L/min) 4.2 Flow (L/min)  Min: 3.6   Min taken time: 03/23/24 0000  Max: 4.2   Max taken time: 03/23/24 0600   Motor Current 414/332 Motor Current  Min: 393/297   Min taken time: 03/23/24 0000  Max: 438/319   Max taken time: 03/22/24 1100   Placement Signal Yes  Placement OK could not be evaluated. This SmartLink does not work with rows of the type: Custom List   Purge (mmHg) 470 Purge Pressure (mmHg)  Min: 406   Min taken time: 03/23/24 0500  Max: 605   Max taken time: 03/22/24 2000   Purge rate (mL/hr) 11.7 Purge Rate (mL/hr)  Min: 11   Min taken time: 03/22/24 2000  Max: 12.6   Max taken time: 03/22/24 1700       PHYSICAL EXAM:   Visit Vitals  BP 91/78 (BP Location: Left arm, Patient Position: Lying)   Pulse (!) 121   Temp 36.9 °C (98.4 °F) (Temporal)   Resp 18   Ht 1.549 m (5' 0.98\")   Wt 99.3 kg (218 lb 14.7 oz)   SpO2 100%   BMI 41.39 kg/m²   OB Status Hysterectomy   Smoking Status Never   BSA 2.07 m²     Wt Readings from Last 5 Encounters:   03/23/24 99.3 kg (218 lb 14.7 oz) "   12/07/23 92.1 kg (203 lb)   12/01/23 93 kg (205 lb)   11/29/23 92.9 kg (204 lb 12.8 oz)   11/09/23 91.3 kg (201 lb 3.2 oz)     INTAKE/OUTPUT:  I/O last 3 completed shifts:  In: 2428.9 (24.4 mL/kg) [P.O.:1800; I.V.:628.9 (6.3 mL/kg)]  Out: 3000 (30.1 mL/kg) [Other:3000]  Dosing Weight: 99.6 kg        Physical Exam  Constitutional:       General: She is not in acute distress.     Appearance: Normal appearance. She is not ill-appearing.   HENT:      Head: Normocephalic.      Mouth/Throat:      Mouth: Mucous membranes are moist.   Eyes:      Extraocular Movements: Extraocular movements intact.      Pupils: Pupils are equal, round, and reactive to light.   Neck:      Comments: RIJ dialysis line present - CDI  Cardiovascular:      Rate and Rhythm: Regular rhythm. Tachycardia present.      Pulses: Normal pulses.      Heart sounds: Normal heart sounds.   Pulmonary:      Effort: Pulmonary effort is normal. No respiratory distress.      Breath sounds: Normal breath sounds.   Chest:      Comments: Right axillary impella site CDI   Abdominal:      General: Bowel sounds are normal.      Palpations: Abdomen is soft.      Tenderness: There is no abdominal tenderness.   Musculoskeletal:         General: Normal range of motion.      Cervical back: Normal range of motion.      Right lower leg: No edema.      Left lower leg: No edema.   Skin:     General: Skin is warm.      Capillary Refill: Capillary refill takes 2 to 3 seconds.      Comments: Left groin ECMO cannulation site with drainage    Neurological:      General: No focal deficit present.      Mental Status: She is alert and oriented to person, place, and time.   Psychiatric:         Behavior: Behavior normal. Behavior is cooperative.       DATA:  CMP:  Results from last 7 days   Lab Units 03/23/24  0610 03/22/24  0609 03/21/24  0609 03/20/24  0607 03/19/24  0605 03/18/24  1244 03/18/24  0315 03/17/24  1533 03/17/24  0025 03/16/24  1433   SODIUM mmol/L 134* 129* 134* 130*  126*  --  128* 128* 130* 125*   POTASSIUM mmol/L 3.7 4.5 4.2 3.8 4.2  --  3.9 4.2 3.5 3.5   CHLORIDE mmol/L 97* 94* 98 96* 92*  --  94* 92* 93* 90*   CO2 mmol/L 23 21 23 23 23  --  25 24 28 25   ANION GAP mmol/L 18 19 17 15 15  --  13 16 13 14   BUN mg/dL 12 25* 14 17 26*  --  13 8 2* 9   CREATININE mg/dL 1.55* 3.07* 1.98* 2.38* 3.21*  --  1.82* 1.21* 0.31* 1.27*   EGFR mL/min/1.73m*2 45* 20* 34* 27* 19*  --  37* 61 >90 57*   MAGNESIUM mg/dL 1.91 2.12 2.11 2.21 2.53*  --  2.49* 1.90 1.87 1.96   ALBUMIN g/dL 3.4 3.4 3.5 3.4 3.3*  --  3.5 3.4 3.4 3.3*   ALT U/L 157* 86* 59* 52* 53*  --  70* 71* 84* 79*   AST U/L 408* 192* 102* 83* 103*  --  175* 213* 295* 323*   BILIRUBIN TOTAL mg/dL 3.2* 2.8* 1.8* 1.5* 1.6*  --  2.2* 2.6* 3.6* 4.2*   LIPASE U/L  --   --   --   --   --  46  --  93*  --   --      CBC:  Results from last 7 days   Lab Units 03/23/24  0610 03/22/24  0609 03/21/24  0609 03/20/24  0607 03/19/24  0605 03/18/24  0315 03/17/24  1444 03/17/24  0025   WBC AUTO x10*3/uL 10.1 8.2 7.2 7.6 6.3 7.4 8.9 7.4   HEMOGLOBIN g/dL 8.2* 8.3* 8.1* 7.7* 7.5* 7.5* 7.7* 8.6*   HEMATOCRIT % 26.3* 25.8* 24.9* 24.5* 22.4* 22.5* 23.5* 24.1*   PLATELETS AUTO x10*3/uL 49* 51* 44* 58* 68* 58* 99* 62*   MCV fL 98 93 94 98 90 94 95 87     COAG:   Results from last 7 days   Lab Units 03/23/24  0610 03/22/24  0609 03/21/24  0609 03/20/24  0607 03/19/24  0605 03/18/24  0315 03/17/24  0025 03/16/24  1433   INR  2.3* 2.0* 1.8* 1.7* 1.9* 2.1* 2.2* 2.2*     ABO:   ABO TYPE   Date Value Ref Range Status   03/21/2024 O  Final         HEME/ENDO:  Results from last 7 days   Lab Units 03/18/24  0315 03/17/24  1533   TSH mIU/L 20.74* 15.88*        CARDIAC:   Results from last 7 days   Lab Units 03/23/24  0610 03/22/24  0609 03/21/24  0609 03/20/24  0607 03/19/24  0605 03/18/24  0315 03/17/24  0025 03/16/24  1433   LD U/L 1,677* 1,126* 958* 1,005* 1,105* 1,543* 2,111* 2,170*       ASSESSMENT AND PLAN:   Charla Bowles is a 34 y/o F with PMHx of heart  transplant in March 2022 with postoperative course c/b upper extremity/internal jugular DVTs, and 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 She was admitted to HFICU on 2/15 with concern for cardiogenic shock 2/2 allograft rejection and decompensated heart failure with multiorgan dysfunction including significant elevation of liver enzymes and nonoliguric acute kidney injury. Endomyocardial biopsy on 2/16 revealed mild ACR with +CD4s and negative HLAs, however multisystem organ failure persisted with increased inotropic requirements. IABP placed on 2/18. Transferred to CTICU on 2/19 for [facility] ECMO cannulation with Dr. Marina for persistent multi-organ system dysfunction. Intubated 2/21 for respiratory failure 2/2 pulmonary edema, extubated 2/24. ECMO de-cannulated 2/29/24 but had worsening HIRAM requiring CRRT and overall declined clinical status and IABP was transitioned to R axillary impella 5.5 on 3/1. Transferred from CTICU to HFICU on 3/10 for further management. Continued on milrinone gtt @ 0.25 mcg/kg/min and impella support decreased to P-level 5 on 3/11. Completed PLEX/IVIG on 3/13. Went for RHC 3/13: RA: 16, RV: 43/1, PA: 43/12, PCWP: 23, PA-SAT: 47%, CO: 5.14, CI: 2.64 on P5 of Impella 5.5 and milrinone 0.25 mcg/kg/min. Impella was increased to P6 shortly after, and overnight 3/14 patient's mixed venous dropped from 58->32, CXR was ordered and SGC was in appropriate position. Repeat mixed venous confirmed low value of 37. Impella increased to P8 and stat ECHO ordered 3/14. WBC continued to down trend 3/14 which prompted new infectious workup to be sent, and patient to be started on broad spectrum abx. Patient transitioned to tablo from iHD for better volume removal given elevated CVPs 3/14. 03/15: Malpositioned Impella adjusted at the bedside under echo by Dr. Marina and Dr. Melo. Impella pulled back ~ 1 - 2 cm and confirmed placement, pushed back in 1 cm in the evening by Dr Lewis for placement alarms. Toribio  removed 3/16 and P level decreased to 6 due to continued high hemolysis. 3/17: LDH has plateaued @ 2111. CT scan chest, abd, pelvis complete w/o acute ABD. 3/18: Impella purge solution transitioned from sodium bicarb to heparin solution. 3/19: Milrinone gtt discontinued and transitioned to Epinephrine gtt to improve blood pressures. Initiated Myfortic low dose BID, discontinued Sirolimus, increased Tacrolimus to 2 mg BID (goal: 8 - 10). Received dialysis with SLED 3/19 and 3/20. Trialed diuretic challenge 3/21 which was unsuccessful. P level increased to 7 on 3/21 given lactate. 3/23: P level decreased to P6 given labs were concerning for hemolysis. Afternoon dose of myfortic also held given patients abdominal discomfort. Tacrolimus decreased to 2 mg at 1830.    NEURO:   #Acute Pain   #Insomnia  - C/w tylenol 975 mg q8 PRN for mild pain   - C/w oxycodone 5 mg q4 PRN for moderate pain   - C/w diluadid 0.2 mg q2 PRN for severe pain   - C/w lidocaine patch PRN   - C/w melatonin 10 mg nightly   - C/w trazodone 50 mg nightly for insomnia  - PT/OT   - CAM ICU score q shift  - Sleep/wake cycle hygiene  - Serial neuro and pain assessments     ENT:  #Epistaxis (resolved)  - Significant epistaxis 2/28 and 3/3 requiring ENT consult, packing removed by ENT 3/5  - S/p ocean spray 5x day x10 days completed   - Ocean spray and mupiricin PRN for dry nasal passages     CARDIAC:  #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)  Toxo: -/-   Hep C: -/-     Rejection/Prophylaxis (transplant):  - Steroids: prednisone 10 mg daily   - Decrease tacrolimus: 2.5 mg PO @ 0630 & 2 mg PO @ 1830 w/ daily levels drawn @ 0600   - Sirolimus discontinued 3/19   - Holding Myfortic 360 mg BID given abdominal pain per patient (started 3/19, increased dose 3/21)  - Tacrolimus goal troughs: 8-10, daily level (3/23): 11.6  - Antifungals: nystatin oral suspension 5 mls q6  -  Antivirals: valcyte 450 mg q 48 due to low grade viremia   - Holding anti PCP & toxoplasmosis: Bactrim SS daily 2/2 thrombocytopenia (2/23)     Last cardiac biopsy: 2/16/24 with ACR1 and no AMR  Last HLA (2/16/24): negative for DSAs   Last RHC (3/13/24): RA: 16, RV: 43/1, PA: 43/12, PCWP: 23, PA-SAT: 47%, CO: 5.14, CI: 2.64 on P5 of Impella 5.5 and milrinone 0.25 mcg/kg/min   Last LHC (2/18/24): negative for CAV and CAD   Last TTE/BOB (3/22/24): LVEF 15-20%, moderately enlarged RV, severely reduced RV systolic function, impella inflow cannula is visualized in the LV apex, impella tip 4.3 cm from aortic annulus   Osteopenia/osteoporosis prophylaxis: Vitamin D3 and calcium supplements  Peptic/gastric ulcer prophylaxis: Pantoprazole 40 mg daily   CAV Prophylaxis: Holding aspirin 81 mg daily 2/2 thrombocytopenia & holding rosuvastatin 40 mg nightly 2/2 transaminitis     - Unclear cause of acute severe graft dysfunction. Most likely smoldering ACR vs. AMR; however, 2x allograft biopsies on 2/16 & 2/20 remain negative for any significant pathology. Notably, both biopsies taken after rejection therapies implemented which may have reduced areas of graft damage.    - DSAs remain negative; however, patient may have non-HLA antibodies present. 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   - Negative CAV & CAD via LHC on 2/18/24   - Completed methylpred steroid pulse w/ 1g q24 x 3 days (2/16-2/18)  - Thymoglobulin doses: 2/18 & 2/19   - IVIG + PLEX Sessions completed: 2/18, 2/20, 3/7, 3/11, 3/13 (completed)  - Graft function slightly worse after transition to impella 5.5 on 3/1. 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  #Impella 5.5 support (3/1/24)  RHC (3/13/24): RA: 16, RV: 43/1, PA: 43/12, PCWP: 23, PA-SAT: 47%, CO: 5.14, CI: 2.64 on P5 of Impella 5.5, milrinone 0.25 mcg/kg/min,  hydralazine 100 mg q8, isordil 40 mg q8  Opening SGC #s (3/13): BP 94/71 (79), PAP 42/30 (35), CVP 13, , CO/CI (kyra) 5.46/2.80, SVO2 56% on P5 of Impella 5.5, milrinone 0.25 mcg/kg/min, hydralazine 100 mg q8, isordil 40 mg q8  Closing SGC #s (3/16): /86 (97), CVP 20, PAP 35/25 (42), SVR 1115, CO/CI (kyra) 5.5/2.8, SVO2 51% on P7 of Impella 5.5, milrinone 0.25 mcg/kg/min, hydralazine 50 mg q8, isordil 20 mg q8  Milrinone discontinued 3/19  - Maintain goal MAP 70-90  - Wean Impella to P6 given concern for hemolysis  - Transitioned to heparin purge for Impella per Dr. Wright (03/19) -> monitor ACT Q4 hours   - C/w epinephrine gtt for improved blood pressure to increase fluid removal (3/19)  - Send afternoon LDH/ lactate   - Volume removal with dialysis    #Advanced therapy evaluation  - Presented to committee 3/12/2024 - concern for end organ failure (possible heart / kidney)   - Not a candidate for transplant at this time per committee discussion 3/12/24  - Discussed in selection committee meeting 03/19 -   (1) likelihood of cardiac recovery s/p graft failure seems less likely as time goes by  (2) she may be a candidate for re-do cardiac transplantation, but she has high risk features (kidney failure, congestion, prior failure of immunosuppression)  (3) per UNOS guidelines she would not be a candidate for combined H/K listing until 6 weeks have passed since kidney failure episode started.      PULM:   #Acute Hypoxic Respiratory Failure 2/2 pulmonary edema (resolved)  ETT (2/21-2/24)  Remains on RA   - Maintain SpO2 > 92%     GI:   #Abdominal Pain, thought to be 2/2 myfortic dose increase  #Nausea  #Constipation   #Emesis (dark blood - clots) (resolved)  Repeat CT chest/abd/pelvis w/o contrast (3/17): no acute abdomen  Patient had emesis x1 with dark blood in vomit -> GI consult placed 3/17 - signed off (protonix gtt for 3 days, d/tri on 3/20)  C diff (3/17): negative  - C/w zofran 4 mg q4 PRN for  nausea  - Bowel regimen: Patti-Colace BID + miralax daily PRN   - Re- engage GI per patient request 3/23    #Hx of gastric bypass   #Hx of MMF colitis  #Acute transaminitis   - Continue home PPI, calcitriol 0.5 mg daily, multivitamin  - Trend LFTs daily      :   #Acute Renal Failure 2/2 to cardiorenal syndrome (on dialysis), anuric   Baseline Cr 1.2-1.3  S/p diuretic challenge 3/21 without response   - RFP daily and PRN  - SLED completed O/N 3/22 with 3 L removed, plan for CVVHD 3/24  - Will need tunneled dialysis line   - Transplant nephrology following closely, appreciate assistance     ENDO:   #T2DM  Steroid induced hyperglycemia acceptable glycemic control on SSI  - Maintain BG <180 with hypoglycemia protocol  - C/w SSI     #Hypothyroidism  TSH (3/17): 20.74, T4 1.11, T3: 1.4    - C/w synthroid 200 mcg daily      HEME:   #Acute DVT LIJ and SVT left cephalic vein and right cephalic vein   #Iron deficiency anemia  #Acute blood loss anemia   #Multiple transfusions   #Hemolysis   UE and LE Venous duplex (3/6/24): right acute occlusive superficial venous thrombosis visualized in the mid cephalic and acute occlusive superficial venous thrombosis visualized in the distal cephalic veins, left acute non-occlusive deep vein thrombosis visualized in the internal jugular and acute non-occlusive superficial venous thrombosis visualized in the mid cephalic veins   UE and LE Venous duplex (3/12): unchanged from prior  Last type and screen: 3/15  - 3/15: Transfused 1 unit leukocyte reduced PRBC's   - 3/16: Transfused 1 unit leukocyte reduced PRBC's   - Heparin impella purge check ACT Q4 hours   - ASA on hold d/t low platelet count   - Continue SCDs for DVT prophylaxis  - Continue Aranesp every 2 weeks  - Vascular medicine signed off 3/13, will need discussion regarding long term anticoagulation closer to discharge   - Consulted hematology regarding DIC / ALBERT: unlikely per hematology (3/17)  - Per hematology, trend daily  T/D-bilirubin, LDH, haptoglobin, reticulocyte count, fibrinogen, PT/PTT/INR, D-dimer  - [id number] sent per heme recs: 43- the tacrolimus level has been adjusted recently and there may be a tacro-induced TMA.     ID:   #Leukopenia, likely in the setting of IVIG vs infectious process (resolved)  Blood cultures (2/15): NGTD  Wound culture (3/15): NGTD  S/p vancomycin + zosyn (3/14-3/17)  S/p diflucan 200 mg x1 (3/21) given concern for yeast infection   Afebrile, nontoxic  - Left groin ECMO cannulation site - wound draining - wound care following, appreciate recommendations  - Trend temp q4h     PHYSICAL AND OCCUPATIONAL THERAPY: ordered and following     LINES:  PIVs  RIJ trialysis catheter 3/5 (discussed line in rounds this AM as patient has no other access)   R Axillary Impella 3/1    DVT: SCDs  VAP BUNDLE: N/A  ULCER PPX: PPI  GLYCEMIC CONTROL: SSI  BOWEL CARE: Patti-colace, miralax    INDWELLING CATHETER: NA  NUTRITION: Adult diet Regular; 1500 mL fluid      EMERGENCY CONTACT: Extended Emergency Contact Information  Primary Emergency Contact: SAHIL DIMAS  Address: [address] [place] [address]  Home Phone: [phone]  Mobile Phone: [phone]  Relation: Mother  FAMILY UPDATE: Updated daily   CODE STATUS: Full Code  DISPO: Remain in HFICU    Patient seen and assessed with Dr. Wright   _________________________________________________  Jerilyn Jay PA-C

## 2024-03-23 NOTE — PROGRESS NOTES
HFICU Attending Note    Principal Problem:    Cardiogenic shock (CMS/HCC)  Active Problems:    Heart transplant recipient (CMS/HCC)    ESRD (end stage renal disease) on dialysis (CMS/HCC)    HIRAM (acute kidney injury) (CMS/[facility])    Acute passive congestion of liver    Hyponatremia    Iron deficiency anemia    Encounter for aftercare following heart transplant (CMS/HCC)    Heart transplant as cause of abnormal reaction or later complication (CMS/HCC)    Cardiac transplant rejection (CMS/HCC)    Abnormal findings on diagnostic imaging of heart and coronary circulation    Acute combined systolic (congestive) and diastolic (congestive) heart failure (CMS/HCC)    The last day and a half has been challenging. We attempted a diuretic challenge (I.e., holding dialysis in setting of CVP 10) and giving high dose diuretics. She unfortunately produced no urine with this effort.    In setting impella speed P4 and epi infusions her lactate adam. It was unclear to us if this was due to worsened cardiac output or side effects from epi infusion. We slightly reduced epi dose and increased impella up to P7, with improvement/reduction in lactate levels.    Repeat echocardiogram done unfortunately showing severe biventricular dysfunction.    -- Cardiogenic shock: continue support with MCS and epi infusion  -- Optimize immunosuppression for heart transplant with tacrolimus + myfortic. Hopefully she can tolerate myfortic without GI side effects.  -- Continue encouraging ambulation to minimize risk of worsening deconditioning     This critically ill patient continues to be at-risk for clinically significant deterioration / failure due to the above mentioned dysfunctional, unstable organ systems.  I have personally identified and managed all complex critical care issues to prevent aforementioned clinical deterioration.  Critical care time is spent at bedside and/or the immediate area and has included, but is not limited to, the review of  diagnostic tests, labs, radiographs, serial assessments of hemodynamics, respiratory status, ventilatory management, and family updates.  Time spent in procedures and teaching are reported separately.    Critical care time: ____ minutes

## 2024-03-23 NOTE — PROGRESS NOTES
[facility]  HFICU PROGRESS NOTE    Charla Bowles/[id number]    Admit Date: 2/15/2024  Hospital Length of Stay: 37   ICU Length of Stay: 13d 2h   Primary Service: HFICU  Primary HF Cardiologist: Dr. Cornell  Referring: Dr Cornell     INTERVAL EVENTS / PERTINENT ROS:     Yesterday patient lactate adam with what seemed like higher epinephrine dosing and lasix challenge. Epi was decreased, impella speed was increased to P7, and lasix was stopped. Lactate by morning returned to normal. Patient requesting to see ophthalmology for vision changes which was ordered yesterday afternoon. Patients labs still showing signs of hemolysis so will have limited echo done per Dr Ferrari request to see if repositioning is possible.     Plan:  Dialysis session today  Ophthalmology to see patient per her request   Limited ECHO     MEDICATIONS  Infusions:  EPINEPHrine, Last Rate: 0.02 mcg/kg/min (03/23/24 1600)  heparin Impella Purge 25 units/mL in 500 mL D5W, Last Rate: 10 mL/hr (03/23/24 1600)      Scheduled:  [Held by provider] aspirin, 81 mg, Daily  calcitriol, 0.5 mcg, Daily  darbepoetin floyd, 100 mcg, q14 days  ferrous sulfate (325 mg ferrous sulfate), 65 mg of iron, Daily with breakfast  folic acid, 1 mg, Daily  heparin, ,   insulin lispro, 0-10 Units, Before meals & nightly  levothyroxine, 200 mcg, Daily  lidocaine, 1 patch, Daily  melatonin, 10 mg, Nightly  multivitamin with minerals, 1 tablet, Daily  [Held by provider] mycophenolate, 180 mg, BID  nystatin, 5 mL, q6h  pantoprazole, 40 mg, Daily before breakfast  perflutren lipid microspheres, 0.5-10 mL of dilution, Once in imaging  predniSONE, 10 mg, Daily  [Held by provider] rosuvastatin, 40 mg, Nightly  sennosides-docusate sodium, 1 tablet, BID  [Held by provider] sulfamethoxazole-trimethoprim, 80 mg of trimethoprim, Daily  tacrolimus, 2 mg, q24h  [START ON 3/24/2024] tacrolimus, 2.5 mg, q24h  traZODone, 50 mg, Nightly  valGANciclovir,  "450 mg, q48h      PRN:  acetaminophen, 975 mg, q8h PRN  benzocaine-menthol, 1 lozenge, q2h PRN  dextrose, 25 g, q15 min PRN  dextrose, 25 g, q15 min PRN   Or  glucagon, 1 mg, q15 min PRN  diphenhydrAMINE, 25 mg, q5 min PRN  glucagon, 1 mg, q15 min PRN  guaiFENesin, 600 mg, BID PRN  heparin, ,   HYDROmorphone, 0.2 mg, q2h PRN  artificial tears, 2 drop, PRN  microfibrllar collagen, , PRN  mupirocin, , BID PRN  ondansetron, 4 mg, q4h PRN  oxyCODONE, 5 mg, q4h PRN  polyethylene glycol, 17 g, Daily PRN  sodium chloride, 1 spray, 4x daily PRN        Impella:      Most Recent Range Past 24hrs   Performance Level 6 P Level  Min: 6   Min taken time: 03/23/24 1600  Max: 7   Max taken time: 03/23/24 0900   Flow (L/min) 3.3 Flow (L/min)  Min: 3.3   Min taken time: 03/23/24 1600  Max: 4.2   Max taken time: 03/23/24 0600   Motor Current 334/248 Motor Current  Min: 329/247   Min taken time: 03/23/24 1400  Max: 414/332   Max taken time: 03/23/24 0600   Placement Signal Yes  Placement OK could not be evaluated. This SmartLink does not work with rows of the type: Custom List   Purge (mmHg) 433 Purge Pressure (mmHg)  Min: 406   Min taken time: 03/23/24 0500  Max: 605   Max taken time: 03/22/24 2000   Purge rate (mL/hr) 12.1 Purge Rate (mL/hr)  Min: 11   Min taken time: 03/22/24 2000  Max: 12.6   Max taken time: 03/23/24 1000       PHYSICAL EXAM:   Visit Vitals  BP 91/78 (BP Location: Left arm, Patient Position: Lying)   Pulse (!) 115   Temp 36.6 °C (97.9 °F)   Resp 22   Ht 1.549 m (5' 0.98\")   Wt 99.3 kg (218 lb 14.7 oz)   SpO2 100%   BMI 41.39 kg/m²   OB Status Hysterectomy   Smoking Status Never   BSA 2.07 m²     Wt Readings from Last 5 Encounters:   03/23/24 99.3 kg (218 lb 14.7 oz)   12/07/23 92.1 kg (203 lb)   12/01/23 93 kg (205 lb)   11/29/23 92.9 kg (204 lb 12.8 oz)   11/09/23 91.3 kg (201 lb 3.2 oz)     INTAKE/OUTPUT:  I/O last 3 completed shifts:  In: 2428.9 (24.4 mL/kg) [P.O.:1800; I.V.:628.9 (6.3 mL/kg)]  Out: 3000 (30.1 " mL/kg) [Other:3000]  Dosing Weight: 99.6 kg        Physical Exam  Constitutional:       General: She is not in acute distress.     Appearance: Normal appearance. She is not ill-appearing.   HENT:      Head: Normocephalic.      Mouth/Throat:      Mouth: Mucous membranes are moist.   Eyes:      Extraocular Movements: Extraocular movements intact.      Pupils: Pupils are equal, round, and reactive to light.   Neck:      Comments: RIJ dialysis line present   Cardiovascular:      Rate and Rhythm: Regular rhythm. Tachycardia present.      Pulses: Normal pulses.      Heart sounds: Normal heart sounds.   Pulmonary:      Effort: Pulmonary effort is normal. No respiratory distress.      Breath sounds: Normal breath sounds.   Chest:      Comments: Right axillary impella site CDI   Abdominal:      General: Bowel sounds are normal.      Palpations: Abdomen is soft.      Tenderness: There is no abdominal tenderness.   Musculoskeletal:         General: Normal range of motion.      Cervical back: Normal range of motion.      Right lower leg: No edema.      Left lower leg: No edema.   Skin:     General: Skin is warm.      Capillary Refill: Capillary refill takes 2 to 3 seconds.      Comments: R femoral SGC + cordis CDI site c/d/I line now removed     Left groin ECMO cannulation site with drainage    Neurological:      General: No focal deficit present.      Mental Status: She is alert and oriented to person, place, and time.   Psychiatric:         Behavior: Behavior normal. Behavior is cooperative.       DATA:  CMP:  Results from last 7 days   Lab Units 03/23/24  0610 03/22/24  0609 03/21/24  0609 03/20/24  0607 03/19/24  0605 03/18/24  1244 03/18/24  0315 03/17/24  1533 03/17/24  0025   SODIUM mmol/L 134* 129* 134* 130* 126*  --  128* 128* 130*   POTASSIUM mmol/L 3.7 4.5 4.2 3.8 4.2  --  3.9 4.2 3.5   CHLORIDE mmol/L 97* 94* 98 96* 92*  --  94* 92* 93*   CO2 mmol/L 23 21 23 23 23  --  25 24 28   ANION GAP mmol/L 18 19 17 15 15  --   13 16 13   BUN mg/dL 12 25* 14 17 26*  --  13 8 2*   CREATININE mg/dL 1.55* 3.07* 1.98* 2.38* 3.21*  --  1.82* 1.21* 0.31*   EGFR mL/min/1.73m*2 45* 20* 34* 27* 19*  --  37* 61 >90   MAGNESIUM mg/dL 1.91 2.12 2.11 2.21 2.53*  --  2.49* 1.90 1.87   ALBUMIN g/dL 3.4 3.4 3.5 3.4 3.3*  --  3.5 3.4 3.4   ALT U/L 157* 86* 59* 52* 53*  --  70* 71* 84*   AST U/L 408* 192* 102* 83* 103*  --  175* 213* 295*   BILIRUBIN TOTAL mg/dL 3.2* 2.8* 1.8* 1.5* 1.6*  --  2.2* 2.6* 3.6*   LIPASE U/L  --   --   --   --   --  46  --  93*  --        CBC:  Results from last 7 days   Lab Units 03/23/24  0610 03/22/24  0609 03/21/24  0609 03/20/24  0607 03/19/24  0605 03/18/24  0315 03/17/24  1444 03/17/24  0025   WBC AUTO x10*3/uL 10.1 8.2 7.2 7.6 6.3 7.4 8.9 7.4   HEMOGLOBIN g/dL 8.2* 8.3* 8.1* 7.7* 7.5* 7.5* 7.7* 8.6*   HEMATOCRIT % 26.3* 25.8* 24.9* 24.5* 22.4* 22.5* 23.5* 24.1*   PLATELETS AUTO x10*3/uL 49* 51* 44* 58* 68* 58* 99* 62*   MCV fL 98 93 94 98 90 94 95 87       COAG:   Results from last 7 days   Lab Units 03/23/24  0610 03/22/24  0609 03/21/24  0609 03/20/24  0607 03/19/24  0605 03/18/24  0315 03/17/24  0025   INR  2.3* 2.0* 1.8* 1.7* 1.9* 2.1* 2.2*       ABO:   ABO TYPE   Date Value Ref Range Status   03/21/2024 O  Final         HEME/ENDO:  Results from last 7 days   Lab Units 03/18/24  0315 03/17/24  1533   TSH mIU/L 20.74* 15.88*          CARDIAC:   Results from last 7 days   Lab Units 03/23/24  1306 03/23/24  0610 03/22/24  0609 03/21/24  0609 03/20/24  0607 03/19/24  0605 03/18/24  0315 03/17/24  0025   LD U/L 1,757* 1,677* 1,126* 958* 1,005* 1,105* 1,543* 2,111*         ASSESSMENT AND PLAN:   Charla Bowles is a 32 y/o F with PMHx of heart transplant in March 2022 with postoperative course c/b upper extremity/internal jugular DVTs, and 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 She was admitted to HFICU on 2/15 with concern for cardiogenic shock 2/2 allograft rejection and decompensated heart failure with multiorgan  dysfunction including significant elevation of liver enzymes and nonoliguric acute kidney injury. Endomyocardial biopsy on 2/16 revealed mild ACR with +CD4s and negative HLAs, however multisystem organ failure persisted with increased inotropic requirements. IABP placed on 2/18. Transferred to CTICU on 2/19 for [facility] ECMO cannulation with Dr. Marina for persistent multi-organ system dysfunction. Intubated 2/21 for respiratory failure 2/2 pulmonary edema, extubated 2/24. ECMO de-cannulated 2/29/24 but had worsening HIRAM requiring CRRT and overall declined clinical status and IABP was transitioned to R axillary impella 5.5 on 3/1. Transferred from CTICU to HFICU on 3/10 for further management. Continued on milrinone gtt @ 0.25 mcg/kg/min and impella support decreased to P-level 5 on 3/11. Completed PLEX/IVIG on 3/13. Went for RHC 3/13: RA: 16, RV: 43/1, PA: 43/12, PCWP: 23, PA-SAT: 47%, CO: 5.14, CI: 2.64 on P5 of Impella 5.5 and milrinone 0.25 mcg/kg/min. Impella was increased to P6 shortly after, and overnight 3/14 patient's mixed venous dropped from 58->32, CXR was ordered and SGC was in appropriate position. Repeat mixed venous confirmed low value of 37. Impella increased to P8 and stat ECHO ordered 3/14. WBC continued to down trend 3/14 which prompted new infectious workup to be sent, and patient to be started on broad spectrum abx. Patient transitioned to tablo from iHD for better volume removal given elevated CVPs 3/14. 03/15: Malpositioned Impella adjusted at the bedside under echo by Dr. Marina and Dr. Melo. Impella pulled back ~ 1 - 2 cm and confirmed placement, pushed back in 1 cm in the evening by Dr Lewis for placement alarms. [place] removed 3/16 and P level decreased to 6 due to continued high hemolysis. 03/17: LDH has plateaued @ 2111. CT scan chest, abd, pelvis complete w/o acute ABD. 03/18: Impella purge solution transitioned from sodium bicarb to heparin solution. 03/19: Milrinone gtt discontinued and  transitioned to Epinephrine gtt to improve blood pressures. Initiated Myfortic low dose BID, Discontinued Sirolimus, Increased Tacrolimus to 2 mg BID (goal: 8 - 10). Received dialysis with SLED 3/19 and 3/20. Trialed diuretic challenge 3/21 which was unsuccessful. P level increased to 7 on 3/21, plan for limited echo today.       NEURO:   #Acute Pain   #Insomnia  - Continue tylenol 975 mg Q8 PRN for mild pain   - Continue oxycodone 5 mg Q4 PRN for moderate pain   - Continue Diluadid 0.2 mg q3 PRN for severe pain   - Lidocaine patch PRN   - Continue melatonin 10 mg nightly   - Continue trazodone 50 mg nightly for insomnia  - PT/OT   - CAM ICU score q shift  - Sleep/wake cycle hygiene  - Serial neuro and pain assessments     ENT:  #Epistaxis (resolved)  - Significant epistaxis 2/28 and 3/3 requiring ENT consult, packing removed by ENT 3/5  - S/p ocean spray 5x day x10 days completed   - Ocean spray and mupiricin PRN for dry nasal passages     CARDIAC:  #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)  Toxo: -/-   Hep C: -/-     Rejection/Prophylaxis (transplant):  - C/w Steroids: 10 mg PO prednisone daily  - Increase Tacrolimus: 2.5 mg PO @ 0630 & 2.5 mg PO @ 1830 w/ daily levels drawn @ 0600 (last change 3/21)  - Sirolimus discontinued 3/19 (stopped 3/11)   - C/w Myfortic 360 mg BID (Started 3/19, increased 3/21)  - Tacrolimus goal troughs: 8 - 10 (Goal Changed 03/19) , daily level 3/22: 7.9  - Antifungals: nystatin oral suspension 5 mls q6  - Antivirals: Valcyte 450 mg q48 due to low grade viremia   - HOLDING Anti PCP & Toxoplasmosis: Bactrim SS daily --> holding due to thrombocytopenia (2/23)     Last cardiac biopsy: 2/16/24 with ACR1 and no AMR  Last HLA (2/16/24): negative for DSAs   Last RHC (3/13/24): RA: 16, RV: 43/1, PA: 43/12, PCWP: 23, PA-SAT: 47%, CO: 5.14, CI: 2.64 on P5 of Impella 5.5 and milrinone 0.25 mcg/kg/min   Last LHC  (2/18/24): negative for CAV and CAD   Last TTE/BOB (3/1/24): shows LVEF to 30% and mild RV dysfunction (intra-op impella 5.5 insert)  Osteopenia/osteoporosis prophylaxis: Vitamin D3 and calcium supplements  Peptic/gastric ulcer prophylaxis: Pantoprazole 40 mg daily   CAV Prophylaxis: HOLDING Aspirin 81 mg daily 2/2 thrombocytopenia & HOLDING rosuvastatin 40 mg nightly 2/2 transaminitis     - Unclear cause of acute severe graft dysfunction. Most likely smoldering ACR vs. AMR; however, 2x allograft biopsies on 2/16 & 2/20 remain negative for any significant pathology. Notably, both biopsies taken after rejection therapies implemented which may have reduced areas of graft damage.    - DSAs remain negative; however, patient may have non-HLA antibodies present. 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   - Negative CAV & CAD via LHC on 2/18/24   - Completed methylpred steroid pulse w/ 1g q24 x 3 days (2/16-2/18) and prednisone 10 mg daily   - Thymoglobulin doses: 2/18 & 2/19   - IVIG + PLEX Sessions completed: 2/18, 2/20, 3/7, 3/11, 3/13 - completed   - Graft function slightly worse after transition to impella 5.5 on 3/1. 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  #Impella 5.5 support (3/1/24)  RHC (3/13/24): RA: 16, RV: 43/1, PA: 43/12, PCWP: 23, PA-SAT: 47%, CO: 5.14, CI: 2.64 on P5 of Impella 5.5, milrinone 0.25 mcg/kg/min, hydralazine 100 mg q8, isordil 40 mg q8  Opening SGC #s (3/13): BP 94/71 (79), PAP 42/30 (35), CVP 13, , CO/CI (kyra) 5.46/2.80, SVO2 56% on P5 of Impella 5.5, milrinone 0.25 mcg/kg/min, hydralazine 100 mg q8, isordil 40 mg q8  Daily/ Closing SGC #s (3/16): /86 (97), CVP 20, PAP 35/25 (42), SVR 1115, CO/CI (kyra) 5.5/2.8, SVO2 51% on P7 of Impella 5.5, milrinone 0.25 mcg/kg/min, hydralazine 50 mg q8, isordil 20 mg q8  - Maintain goal MAP  70-90  - Impella P level maintaining at P7, wean per clinical picture / hemodynamics   - Transitioned to Heparin purge for Impella per Dr. Wright (03/19) -> monitor ACT Q4 hours   - Discontinue milrinone gtt 0.25 mcg/kg/min (03/19)  - C/w Epinephrine gtt for improved blood pressure to increase fluid removal (03/19)  - Discontinued afterload reduction with PO hydralazine 50 mg q8 + PO isosorbide 20 mg q8 2/2 hypotension    - Continue ASA - Currently HELD 2/2 low platelet count  - Continue to hold rosuvastatin 40 mg daily until transaminases normalize - improving daily   - S/p Digoxin 125 mcg PO (3/12, 3/13), digoxin level (3/14): 0.71 - currently discontinued - will readdress for RV protection / HR  - STAT ECHO completed 03/14: LV systolic function is severely decreased with a 25-30% estimated ejection fraction; There is mildly reduced right ventricular systolic function; Moderate mitral valve regurgitation; Moderate to severe tricuspid regurgitation visualized; There is global hypokinesis of the left ventricle with minor regional variations.  - Limited ECHO ordered for Impella placement 3/22    #Lactic acidosis (resolved)  - Slightly elevated lactate level 3/21 at 2.4, peaked at 3.6 now normalized at 1.8 today 3/22  - Continue P level at 7  - Recheck in afternoon     #Advanced therapy evaluation  - Presented to committee 3/12/2024 - concern for end organ failure (possible heart / kidney)   - Not a candidate for transplant at this time per committee discussion 3/12/24  - Discussed in selection committee meeting 03/19 -   (1) likelihood of cardiac recovery s/p graft failure seems less likely as time goes by  (2) she may be a candidate for re-do cardiac transplantation, but she has high risk features (kidney failure, congestion, prior failure of immunosuppression)  (3) per UNOS guidelines she would not be a candidate for combined H/K listing until 6 weeks have passed since kidney failure episode started.      PULM:    #Acute Hypoxic Respiratory Failure 2/2 pulmonary edema (resolved)  ETT (2/21-2/24)  Remains on RA   - Maintain SpO2 > 92%     GI:   #Nausea  #Constipation   #Diarrhea - resolved   #Emesis (dark blood - clots) - resolved   #Right sided flank pain - improved   - Repeat CT chest/abd/pelvis w/o contrast (3/17): no acute abdomen    - C/w Zofran PRN   - C/w Olanzapine 2.5 mg IM TID PRN   - Resume Patti-Colace PRN / Miralax PRN (no BM x 24 hours)   - Patient had emesis x1 with dark blood in vomit -> GI consult placed 3/17 - signed off (protonix gtt for 3 days, d/tri on 3/20)  - Sent stool for C-Diff -> Negative (03/17)    #Hx of gastric bypass   #Hx of MMF colitis  #Acute transaminitis   - Continue home PPI, calcitriol 0.5 mg daily, multivitamin  - Continue PRN miralax prn & patti-colace   - Trend LFTs daily - improving 03/18  - Monitor BMs/diarrhea with initiation of Myfortic      :   #Acute Renal Failure 2/2 to cardiorenal syndrome (on IHD)  Baseline Cr 1.2-1.3 - now aneuric   CVVH stopped 2/27  - RFP daily and PRN  - Tablo (completed 03/15, 03/16, & 03/17) / SLED / CVVH per daily assessment - SLED 03/19 and 3/20 per nephrology    - Trialed diuretic challenge Lasix 100 mg x1 and lasix gtt @ 20 -> no urine   - Dialysis today 3/22   - Transplant nephrology following closely, appreciate assistance   - Per Dr. Wright, every day fluid removal - route to be determined by nephrology      ENDO:   #T2DM  Steroid induced hyperglycemia acceptable glycemic control on SSI  - Maintain BG <180 with hypoglycemia protocol  - Continue SSI     #Hypothyroidism  TSH (3/17): 20.74, T4 1.11, T3: 1.4    - Continue synthroid 200 mcg daily   - Endocrine following, appreciate assistance      HEME:   #Acute DVT LIJ and SVT left cephalic vein and right cephalic vein   #Iron deficiency anemia  #Acute blood loss anemia   #Multiple transfusions   #Hemolysis - resolving slowly   UE and LE Venous duplex (3/6/24): right acute occlusive superficial  venous thrombosis visualized in the mid cephalic and acute occlusive superficial venous thrombosis visualized in the distal cephalic veins, left acute non-occlusive deep vein thrombosis visualized in the internal jugular and acute non-occlusive superficial venous thrombosis visualized in the mid cephalic veins   UE and LE Venous duplex (3/12): unchanged from prior  Last type and screen: 3/15  - 03/15: Transfused 1 unit leukocyte reduced PRBC's   - 03/16: Transfused 1 unit leukocyte reduced PRBC's   - Heparin impella purge check ACT Q4 hours   - ASA on hold d/t low platelet count   - Continue SCDs for DVT prophylaxis  - Continue Aranesp every 2 weeks  - Vascular medicine signed off 3/13, will need discussion regarding long term anticoagulation closer to discharge   - Consulted hematology regarding DIC / ALBERT: unlikely per hematology (03/17)  - Per hematology, trend daily T/D-bilirubin, LDH, haptoglobin, reticulocyte count, fibrinogen, PT/PTT/INR, D-dimer.  - [id number] sent per heme recs: 43- the tacrolimus level has been adjusted recently and there may be a tacro-induced TMA.     ID:   #Leukopenia, likely in the setting of IVIG vs infectious process (resolved)  Blood cultures (2/15): NGTD  Afebrile, nontoxic  - Sent infectious workup: blood cultures + respiratory culture - No Growth final report 03/19  - Stopped Broad spectrum abx 03/17: vancomycin + zosyn (3/14-3/17)  - Follow lactate: 03/21: 2.4 -> recheck in afternoon  - Left groin ECMO cannulation site - wound draining - wound care following appreciate recs - cultures sent - No growth   - Diflucan 200 mg x1 for yeast infection concern   - Trend temp q4h     PHYSICAL AND OCCUPATIONAL THERAPY: ordered and following     LINES:  PIVs  RIJ trialysis catheter 3/5 (discussed line in rounds this AM as patient has no other access)   R Axillary Impella 3/1    DVT: SCDs  VAP BUNDLE: N/A  ULCER PPX: PPI - pantoprazole gtt until 03/20 @ 1900   GLYCEMIC CONTROL: SSI  BOWEL  CARE: Patti-colace, miralax    INDWELLING CATHETER: NA  NUTRITION: Adult diet Regular; 1500 mL fluid      EMERGENCY CONTACT: Extended Emergency Contact Information  Primary Emergency Contact: SAHIL DIMAS  Address: [address] [place] [address]  Home Phone: [phone]  Mobile Phone: [phone]  Relation: Mother  FAMILY UPDATE: given at bedside  CODE STATUS: Full Code  DISPO: remain in HFICU    Patient seen and assessed with Dr. Wright   _________________________________________________  GRAY ROSAS RN
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
## 2024-03-24 NOTE — PROGRESS NOTES
[facility]  HFICU PROGRESS NOTE    Charla Bowles/[id number]    Admit Date: 2/15/2024  Hospital Length of Stay: 38   ICU Length of Stay: 13d 19h   Primary Service: HFICU  Primary HF Cardiologist: Dr. Cornell  Referring: Dr Cornell     INTERVAL EVENTS / PERTINENT ROS:   No acute events O/N. Patient remains hemodynamically supported on Impella P6, and epinephrine 0.02. Labs continue to be concerning for hemolysis. Lactate is stable at 2.2 with Impella wean yesterday. Patient states her abdominal pain is a little better this AM, denies any other concerns.      Plan:  - CRRT today   - Restart myfortic 180 mg BID   - C/w Impella at P6  - Tacrolimus level (3/24): 10.4, c/w tacrolimus dose 2.5 mg 0630 + 2 mg 1830  - Will need to talk to IR 3/25 about tunneled dialysis cath placement     MEDICATIONS  Infusions:  EPINEPHrine, Last Rate: 0.02 mcg/kg/min (03/24/24 1000)  heparin Impella Purge 25 units/mL in 500 mL D5W, Last Rate: 10 mL/hr (03/24/24 1000)      Scheduled:  [Held by provider] aspirin, 81 mg, Daily  calcitriol, 0.5 mcg, Daily  darbepoetin floyd, 100 mcg, q14 days  ferrous sulfate (325 mg ferrous sulfate), 65 mg of iron, Daily with breakfast  folic acid, 1 mg, Daily  insulin lispro, 0-10 Units, Before meals & nightly  levothyroxine, 200 mcg, Daily  lidocaine, 1 patch, Daily  melatonin, 10 mg, Nightly  multivitamin with minerals, 1 tablet, Daily  mycophenolate, 180 mg, BID  nystatin, 5 mL, q6h  pantoprazole, 40 mg, BID AC  perflutren lipid microspheres, 0.5-10 mL of dilution, Once in imaging  predniSONE, 10 mg, Daily  [Held by provider] rosuvastatin, 40 mg, Nightly  sennosides-docusate sodium, 1 tablet, BID  [Held by provider] sulfamethoxazole-trimethoprim, 80 mg of trimethoprim, Daily  tacrolimus, 2 mg, q24h  tacrolimus, 2.5 mg, q24h  traZODone, 50 mg, Nightly  valGANciclovir, 450 mg, q48h      PRN:  acetaminophen, 975 mg, q8h PRN  benzocaine-menthol, 1 lozenge, q2h  "PRN  dextrose, 25 g, q15 min PRN  dextrose, 25 g, q15 min PRN   Or  glucagon, 1 mg, q15 min PRN  diphenhydrAMINE, 25 mg, q5 min PRN  glucagon, 1 mg, q15 min PRN  guaiFENesin, 600 mg, BID PRN  HYDROmorphone, 0.2 mg, q2h PRN  artificial tears, 2 drop, PRN  microfibrllar collagen, , PRN  mupirocin, , BID PRN  ondansetron, 4 mg, q4h PRN  oxyCODONE, 5 mg, q4h PRN  polyethylene glycol, 17 g, Daily PRN  sodium chloride, 1 spray, 4x daily PRN        Impella:      Most Recent Range Past 24hrs   Performance Level 6 P Level  Min: 6   Min taken time: 03/24/24 0600  Max: 6   Max taken time: 03/24/24 0600   Flow (L/min) 3.4 Flow (L/min)  Min: 3.2   Min taken time: 03/24/24 0400  Max: 3.6   Max taken time: 03/23/24 1500   Motor Current 329/245 Motor Current  Min: 327/257   Min taken time: 03/23/24 2200  Max: 407/310   Max taken time: 03/23/24 1800   Placement Signal Yes  Placement OK could not be evaluated. This SmartLink does not work with rows of the type: Custom List   Purge (mmHg) 387 Purge Pressure (mmHg)  Min: 387   Min taken time: 03/24/24 0600  Max: 583   Max taken time: 03/24/24 0200   Purge rate (mL/hr) 11 Purge Rate (mL/hr)  Min: 11   Min taken time: 03/24/24 0600  Max: 12.3   Max taken time: 03/24/24 0100       PHYSICAL EXAM:   Visit Vitals  BP 96/74   Pulse (!) 111   Temp 36.2 °C (97.2 °F) (Temporal)   Resp (!) 33   Ht 1.549 m (5' 0.98\")   Wt 97.1 kg (214 lb 1.1 oz)   SpO2 96%   BMI 40.47 kg/m²   OB Status Hysterectomy   Smoking Status Never   BSA 2.04 m²     Wt Readings from Last 5 Encounters:   03/24/24 97.1 kg (214 lb 1.1 oz)   12/07/23 92.1 kg (203 lb)   12/01/23 93 kg (205 lb)   11/29/23 92.9 kg (204 lb 12.8 oz)   11/09/23 91.3 kg (201 lb 3.2 oz)     INTAKE/OUTPUT:  I/O last 3 completed shifts:  In: 4013.9 (40.3 mL/kg) [P.O.:3150; I.V.:763.9 (7.7 mL/kg); IV Piggyback:100]  Out: 3130 (31.4 mL/kg) [Urine:10 (0 mL/kg/hr); Other:3000; Stool:120]  Dosing Weight: 99.6 kg        Physical Exam  Constitutional:       " General: She is not in acute distress.     Appearance: Normal appearance. She is not ill-appearing.   HENT:      Head: Normocephalic.      Mouth/Throat:      Mouth: Mucous membranes are moist.   Eyes:      Extraocular Movements: Extraocular movements intact.      Pupils: Pupils are equal, round, and reactive to light.   Neck:      Comments: RIJ dialysis line present - CDI  Cardiovascular:      Rate and Rhythm: Regular rhythm. Tachycardia present.      Pulses: Normal pulses.      Heart sounds: Normal heart sounds.   Pulmonary:      Effort: Pulmonary effort is normal. No respiratory distress.      Breath sounds: Normal breath sounds.   Chest:      Comments: Right axillary impella site CDI   Abdominal:      General: Bowel sounds are normal.      Palpations: Abdomen is soft.      Tenderness: There is no abdominal tenderness.   Musculoskeletal:         General: Normal range of motion.      Cervical back: Normal range of motion.      Right lower leg: No edema.      Left lower leg: No edema.   Skin:     General: Skin is warm.      Capillary Refill: Capillary refill takes 2 to 3 seconds.      Comments: Left groin ECMO cannulation site with drainage    Neurological:      General: No focal deficit present.      Mental Status: She is alert and oriented to person, place, and time.   Psychiatric:         Behavior: Behavior normal. Behavior is cooperative.       DATA:  CMP:  Results from last 7 days   Lab Units 03/24/24  0558 03/23/24  2000 03/23/24  0610 03/22/24  0609 03/21/24  0609 03/20/24  0607 03/19/24  0605 03/18/24  1244 03/18/24  0315 03/17/24  1533   SODIUM mmol/L 131* 132* 134* 129* 134* 130* 126*  --  128* 128*   POTASSIUM mmol/L 4.5 4.3 3.7 4.5 4.2 3.8 4.2  --  3.9 4.2   CHLORIDE mmol/L 96* 97* 97* 94* 98 96* 92*  --  94* 92*   CO2 mmol/L 23 23 23 21 23 23 23  --  25 24   ANION GAP mmol/L 17 16 18 19 17 15 15  --  13 16   BUN mg/dL 26* 22 12 25* 14 17 26*  --  13 8   CREATININE mg/dL 3.01* 2.32* 1.55* 3.07* 1.98*  "2.38* 3.21*  --  1.82* 1.21*   EGFR mL/min/1.73m*2 20* 28* 45* 20* 34* 27* 19*  --  37* 61   MAGNESIUM mg/dL 2.31 2.21 1.91 2.12 2.11 2.21 2.53*  --  2.49* 1.90   ALBUMIN g/dL 3.3* 3.5 3.4 3.4 3.5 3.4 3.3*  --  3.5 3.4   ALT U/L 220* 198* 157* 86* 59* 52* 53*  --  70* 71*   AST U/L 534* 505* 408* 192* 102* 83* 103*  --  175* 213*   BILIRUBIN TOTAL mg/dL 3.1* 2.8* 3.2* 2.8* 1.8* 1.5* 1.6*  --  2.2* 2.6*   LIPASE U/L  --   --   --   --   --   --   --  46  --  93*     CBC:  Results from last 7 days   Lab Units 03/24/24  0048 03/23/24  0610 03/22/24  0609 03/21/24  0609 03/20/24  0607 03/19/24  0605 03/18/24  0315 03/17/24  1444   WBC AUTO x10*3/uL 8.9 10.1 8.2 7.2 7.6 6.3 7.4 8.9   HEMOGLOBIN g/dL 8.3* 8.2* 8.3* 8.1* 7.7* 7.5* 7.5* 7.7*   HEMATOCRIT % 26.7* 26.3* 25.8* 24.9* 24.5* 22.4* 22.5* 23.5*   PLATELETS AUTO x10*3/uL 67* 49* 51* 44* 58* 68* 58* 99*   MCV fL 101* 98 93 94 98 90 94 95     COAG:   Results from last 7 days   Lab Units 03/24/24  0048 03/23/24  0610 03/22/24  0609 03/21/24  0609 03/20/24  0607 03/19/24  0605 03/18/24  0315   INR  2.5* 2.3* 2.0* 1.8* 1.7* 1.9* 2.1*     ABO:   No results found for: \"ABO\"      HEME/ENDO:  Results from last 7 days   Lab Units 03/18/24  0315 03/17/24  1533   TSH mIU/L 20.74* 15.88*        CARDIAC:   Results from last 7 days   Lab Units 03/24/24  0558 03/23/24  2000 03/23/24  1306 03/23/24  0610 03/22/24  0609 03/21/24  0609 03/20/24  0607 03/19/24  0605   LD U/L 1,714* 1,786* 1,757* 1,677* 1,126* 958* 1,005* 1,105*       ASSESSMENT AND PLAN:   Charla Bowles is a 32 y/o F with PMHx of heart transplant in March 2022 with postoperative course c/b upper extremity/internal jugular DVTs, and 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 She was admitted to HFICU on 2/15 with concern for cardiogenic shock 2/2 allograft rejection and decompensated heart failure with multiorgan dysfunction including significant elevation of liver enzymes and nonoliguric acute kidney injury. " Endomyocardial biopsy on 2/16 revealed mild ACR with +CD4s and negative HLAs, however multisystem organ failure persisted with increased inotropic requirements. IABP placed on 2/18. Transferred to CTICU on 2/19 for [facility] ECMO cannulation with Dr. Marina for persistent multi-organ system dysfunction. Intubated 2/21 for respiratory failure 2/2 pulmonary edema, extubated 2/24. ECMO de-cannulated 2/29/24 but had worsening HIRAM requiring CRRT and overall declined clinical status and IABP was transitioned to R axillary impella 5.5 on 3/1. Transferred from CTICU to HFICU on 3/10 for further management. Continued on milrinone gtt @ 0.25 mcg/kg/min and impella support decreased to P-level 5 on 3/11. Completed PLEX/IVIG on 3/13. Went for RHC 3/13: RA: 16, RV: 43/1, PA: 43/12, PCWP: 23, PA-SAT: 47%, CO: 5.14, CI: 2.64 on P5 of Impella 5.5 and milrinone 0.25 mcg/kg/min. Impella was increased to P6 shortly after, and overnight 3/14 patient's mixed venous dropped from 58->32, CXR was ordered and SGC was in appropriate position. Repeat mixed venous confirmed low value of 37. Impella increased to P8 and stat ECHO ordered 3/14. WBC continued to down trend 3/14 which prompted new infectious workup to be sent, and patient to be started on broad spectrum abx. Patient transitioned to tablo from iHD for better volume removal given elevated CVPs 3/14. 03/15: Malpositioned Impella adjusted at the bedside under echo by Dr. Marina and Dr. Melo. Impella pulled back ~ 1 - 2 cm and confirmed placement, pushed back in 1 cm in the evening by Dr Lewis for placement alarms. Austin removed 3/16 and P level decreased to 6 due to continued high hemolysis. 3/17: LDH has plateaued @ 2111. CT scan chest, abd, pelvis complete w/o acute ABD. 3/18: Impella purge solution transitioned from sodium bicarb to heparin solution. 3/19: Milrinone gtt discontinued and transitioned to Epinephrine gtt to improve blood pressures. Initiated Myfortic low dose BID, discontinued  Sirolimus, increased Tacrolimus to 2 mg BID (goal: 8 - 10). Received dialysis with SLED 3/19 and 3/20. Trialed diuretic challenge 3/21 which was unsuccessful. P level increased to 7 on 3/21 given lactate. 3/23: P level decreased to P6 given labs were concerning for hemolysis. Afternoon dose of myfortic also held given patients abdominal discomfort. Tacrolimus decreased to 2 mg at 1830. 3/24 myfortic was restarted at 180 mg BID.     NEURO:   #Acute Pain   #Insomnia  - C/w tylenol 975 mg q8 PRN for mild pain   - C/w oxycodone 5 mg q4 PRN for moderate pain   - C/w diluadid 0.2 mg q2 PRN for severe pain   - C/w lidocaine patch PRN   - C/w melatonin 10 mg nightly   - C/w trazodone 50 mg nightly for insomnia  - PT/OT   - CAM ICU score q shift  - Sleep/wake cycle hygiene  - Serial neuro and pain assessments     ENT:  #Epistaxis (resolved)  - Significant epistaxis 2/28 and 3/3 requiring ENT consult, packing removed by ENT 3/5  - S/p ocean spray 5x day x10 days completed   - Ocean spray and mupiricin PRN for dry nasal passages     CARDIAC:  #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)  Toxo: -/-   Hep C: -/-     Rejection/Prophylaxis (transplant):  - Steroids: prednisone 10 mg daily   - Tacrolimus: 2.5 mg PO @ 0630 & 2 mg PO @ 1830 w/ daily levels drawn @ 0600   - Sirolimus discontinued 3/19   - Resume Myfortic 180 mg BID given abdominal pain per patient (started 3/19, increased dose 3/21 and patient was experiencing worsening abdominal pain)  - Tacrolimus goal troughs: 8-10, daily level (3/24): 10.4  - Antifungals: nystatin oral suspension 5 mls q6  - Antivirals: valcyte 450 mg q 48 due to low grade viremia   - Holding anti PCP & toxoplasmosis: Bactrim SS daily 2/2 thrombocytopenia (2/23)     Last cardiac biopsy: 2/16/24 with ACR1 and no AMR  Last HLA (2/16/24): negative for DSAs   Last RHC (3/13/24): RA: 16, RV: 43/1, PA: 43/12, PCWP: 23, PA-SAT:  47%, CO: 5.14, CI: 2.64 on P5 of Impella 5.5 and milrinone 0.25 mcg/kg/min   Last LHC (2/18/24): negative for CAV and CAD   Last TTE/BOB (3/22/24): LVEF 15-20%, moderately enlarged RV, severely reduced RV systolic function, impella inflow cannula is visualized in the LV apex, impella tip 4.3 cm from aortic annulus   Osteopenia/osteoporosis prophylaxis: Vitamin D3 and calcium supplements  Peptic/gastric ulcer prophylaxis: Pantoprazole 40 mg daily   CAV Prophylaxis: Holding aspirin 81 mg daily 2/2 thrombocytopenia & holding rosuvastatin 40 mg nightly 2/2 transaminitis     - Unclear cause of acute severe graft dysfunction. Most likely smoldering ACR vs. AMR; however, 2x allograft biopsies on 2/16 & 2/20 remain negative for any significant pathology. Notably, both biopsies taken after rejection therapies implemented which may have reduced areas of graft damage.    - DSAs remain negative; however, patient may have non-HLA antibodies present. 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   - Negative CAV & CAD via LHC on 2/18/24   - Completed methylpred steroid pulse w/ 1g q24 x 3 days (2/16-2/18)  - Thymoglobulin doses: 2/18 & 2/19   - IVIG + PLEX Sessions completed: 2/18, 2/20, 3/7, 3/11, 3/13 (completed)  - Graft function slightly worse after transition to impella 5.5 on 3/1. 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  #Impella 5.5 support (3/1/24)  RHC (3/13/24): RA: 16, RV: 43/1, PA: 43/12, PCWP: 23, PA-SAT: 47%, CO: 5.14, CI: 2.64 on P5 of Impella 5.5, milrinone 0.25 mcg/kg/min, hydralazine 100 mg q8, isordil 40 mg q8  Opening SGC #s (3/13): BP 94/71 (79), PAP 42/30 (35), CVP 13, , CO/CI (kyra) 5.46/2.80, SVO2 56% on P5 of Impella 5.5, milrinone 0.25 mcg/kg/min, hydralazine 100 mg q8, isordil 40 mg q8  Closing SGC #s (3/16): /86 (97), CVP 20, PAP 35/25 (42), SVR  1115, CO/CI (kyra) 5.5/2.8, SVO2 51% on P7 of Impella 5.5, milrinone 0.25 mcg/kg/min, hydralazine 50 mg q8, isordil 20 mg q8  Milrinone discontinued 3/19  - Maintain goal MAP 70-90  - C/w Impella to P6 (last weaned from P7 3/23 2/2 labs more concerning for hemolysis)  - Transitioned to heparin purge for Impella per Dr. Wright (03/19) -> monitor ACT Q4 hours   - C/w epinephrine gtt for improved blood pressure to increase fluid removal (3/19)  - Send afternoon lactate  - Volume removal with dialysis    #Advanced therapy evaluation  - Presented to committee 3/12/2024 - concern for end organ failure (possible heart / kidney)   - Not a candidate for transplant at this time per committee discussion 3/12/24  - Discussed in selection committee meeting 03/19 -   (1) likelihood of cardiac recovery s/p graft failure seems less likely as time goes by  (2) she may be a candidate for re-do cardiac transplantation, but she has high risk features (kidney failure, congestion, prior failure of immunosuppression)  (3) per UNOS guidelines she would not be a candidate for combined H/K listing until 6 weeks have passed since kidney failure episode started.      PULM:   #Acute Hypoxic Respiratory Failure 2/2 pulmonary edema (resolved)  ETT (2/21-2/24)  Remains on RA   - Maintain SpO2 > 92%     GI:   #Abdominal Pain, thought to be 2/2 myfortic dose increase (improving)  #Nausea  #Emesis (dark blood - clots) (resolved)  Repeat CT chest/abd/pelvis w/o contrast (3/17): no acute abdomen  Patient had emesis x1 with dark blood in vomit -> GI consult placed 3/17 - signed off (protonix gtt for 3 days, d/tri on 3/20)  C diff (3/17): negative  - C/w zofran 4 mg q4 PRN for nausea  - Bowel regimen: Patti-Colace BID + miralax daily PRN   - PPI BID per GI   - Follow up stool samples: H pylori, fecal calprotectin, fecal lactoferrin   - GI following, appreciate assistance     #Hx of gastric bypass   #Hx of MMF colitis  #Acute transaminitis   - Continue  PPI, calcitriol 0.5 mg daily, multivitamin  - Trend LFTs daily      :   #Acute Renal Failure 2/2 to cardiorenal syndrome (on dialysis), anuric   Baseline Cr 1.2-1.3  S/p diuretic challenge 3/21 without response   - RFP daily and PRN  - Plan for CVVHD 3/24  - Will need tunneled dialysis line, touch base with IR 3/25  - Transplant nephrology following closely, appreciate assistance     ENDO:   #T2DM  Steroid induced hyperglycemia acceptable glycemic control on SSI  - Maintain BG <180 with hypoglycemia protocol  - C/w SSI     #Hypothyroidism  TSH (3/17): 20.74, T4 1.11, T3: 1.4    - C/w synthroid 200 mcg daily      HEME:   #Acute DVT LIJ and SVT left cephalic vein and right cephalic vein   #Iron deficiency anemia  #Acute blood loss anemia   #Multiple transfusions   #Hemolysis   UE and LE Venous duplex (3/6/24): right acute occlusive superficial venous thrombosis visualized in the mid cephalic and acute occlusive superficial venous thrombosis visualized in the distal cephalic veins, left acute non-occlusive deep vein thrombosis visualized in the internal jugular and acute non-occlusive superficial venous thrombosis visualized in the mid cephalic veins   UE and LE Venous duplex (3/12): unchanged from prior  Last type and screen: 3/15  - 3/15: Transfused 1 unit leukocyte reduced PRBC's   - 3/16: Transfused 1 unit leukocyte reduced PRBC's   - Heparin impella purge check ACT Q4 hours   - ASA on hold d/t low platelet count   - Continue SCDs for DVT prophylaxis  - Continue Aranesp every 2 weeks  - Vascular medicine signed off 3/13, will need discussion regarding long term anticoagulation closer to discharge   - Consulted hematology regarding DIC / ALBERT: unlikely per hematology (3/17)  - Per hematology, trend daily T/D-bilirubin, LDH, haptoglobin, reticulocyte count, fibrinogen, PT/PTT/INR, D-dimer  - [id number] sent per heme recs: 43- the tacrolimus level has been adjusted recently and there may be a tacro-induced TMA.      ID:   #Leukopenia, likely in the setting of IVIG vs infectious process (resolved)  Blood cultures (2/15): NGTD  Wound culture (3/15): NGTD  S/p vancomycin + zosyn (3/14-3/17)  S/p diflucan 200 mg x1 (3/21) given concern for yeast infection   Afebrile, nontoxic  - Left groin ECMO cannulation site - wound draining - wound care following, appreciate recommendations  - Trend temp q4h     PHYSICAL AND OCCUPATIONAL THERAPY: ordered and following     LINES:  PIVs  RIJ trialysis catheter 3/5 (discussed line in rounds this AM as patient has no other access)   R Axillary Impella 3/1    DVT: SCDs  VAP BUNDLE: N/A  ULCER PPX: PPI  GLYCEMIC CONTROL: SSI  BOWEL CARE: Patti-colace, miralax    INDWELLING CATHETER: N/A  NUTRITION: Adult diet Regular; 1500 mL fluid  NPO Diet; Effective midnight      EMERGENCY CONTACT: Extended Emergency Contact Information  Primary Emergency Contact: SAHIL DIMAS  Address: [address] [place] [address]  Home Phone: [phone]  Mobile Phone: [phone]  Relation: Mother  FAMILY UPDATE: Updated daily   CODE STATUS: Full Code  DISPO: Remain in HFICU    Patient seen and assessed with Dr. Toussaint  _________________________________________________  Jerilyn Jay PA-C

## 2024-03-24 NOTE — PROGRESS NOTES
HFICU Attending Note    Principal Problem:    Cardiogenic shock (CMS/[facility])  Active Problems:    Heart transplant recipient (CMS/[facility])    ESRD (end stage renal disease) on dialysis (CMS/[facility])    HIRAM (acute kidney injury) (CMS/[facility])    Acute passive congestion of liver    Hyponatremia    Iron deficiency anemia    Abdominal pain    Cardiac transplant rejection (CMS/[facility])    Acute combined systolic (congestive) and diastolic (congestive) heart failure (CMS/[facility])    34-year-old woman status post OHT currently hospitalized with biventricular failure (concern for rejection although never biopsy-proven, status post IV Ig and PLEX).  She continues to struggle with poor systolic function requiring Impella support, acute kidney injury requiring renal replacement therapy, physical deconditioning.  Most recently she has been troubled by severe abdominal pain that is 10.3 he related to the initiation of Myfortic.  Off Myfortic since yesterday, abdominal pain has completely resolved.  We will rechallenge with a lower dose of Myfortic today and observe closely.    From a tMCS perspective per LDH has been variable with normalized lactate.    Being considered for listing for heart/kidney transplantation.  Kidney transplantation would be a potentiality within the next 7 days.    Ambulation encouraged.    This critically ill patient continues to be at-risk for clinically significant deterioration / failure due to the above mentioned dysfunctional, unstable organ systems.  I have personally identified and managed all complex critical care issues to prevent aforementioned clinical deterioration.  Critical care time is spent at bedside and/or the immediate area and has included, but is not limited to, the review of diagnostic tests, labs, radiographs, serial assessments of hemodynamics, respiratory status, ventilatory management, and family updates.  Time spent in procedures and teaching are reported separately.    Critical care time: 2322   minutes

## 2024-03-25 NOTE — PROGRESS NOTES
Spiritual Care Visit     People who were present: Patient and Ethics Learner    Topics discussed: Patient's condition (patient very tired of people asking her how she is after all this time in the hospital). Patient has a strong desire and reasonable request that everyone who enters the room will wear a mask.     Interventions: Provided non-anxious, supportive presence, reflective listening, affirmation and normalization of experience    Plan of Care: Palliative  to provide ongoing spiritual care

## 2024-03-25 NOTE — PROGRESS NOTES
HFICU Attending Note    Principal Problem:    Cardiogenic shock (CMS/HCC)  Active Problems:    Heart transplant recipient (CMS/H[facility])    ESRD (end stage renal disease) on dialysis (CMS/HCC)    HIRAM (acute kidney injury) (CMS/[facility])    Acute passive congestion of liver    Hyponatremia    Iron deficiency anemia    Abdominal pain    Cardiac transplant rejection (CMS/HCC)    Acute combined systolic (congestive) and diastolic (congestive) heart failure (CMS/HCC)    34-year-old woman status post OHT with chronic failure (biventricular failure).  She was reinitiated on Myfortic low-dose 3/24/2024, and fortunately is tolerating this.  She continues to need RRT, and is approaching appropriateness for considering renal transplantation.    Clinically today she is fatigued, and note made of rising lactate and elevated LDH.  TTE at bedside appears to show Impella in suboptimal position and this will be repositioned under echocardiographic guidance today.  In the interim, dobutamine initiated and escalated to 5.    Given her recent abdominal pain, will also get abdominal scan to rule out other causes for lactate elevation.      This critically ill patient continues to be at-risk for clinically significant deterioration / failure due to the above mentioned dysfunctional, unstable organ systems.  I have personally identified and managed all complex critical care issues to prevent aforementioned clinical deterioration.  Critical care time is spent at bedside and/or the immediate area and has included, but is not limited to, the review of diagnostic tests, labs, radiographs, serial assessments of hemodynamics, respiratory status, ventilatory management, and family updates.  Time spent in procedures and teaching are reported separately.    Critical care time: 45 minutes

## 2024-03-25 NOTE — PROGRESS NOTES
"[facility]  HFICU PROGRESS NOTE    Charla Bowles/[id number]    Admit Date: 2/15/2024  Hospital Length of Stay: 39   ICU Length of Stay: 14d 18h   Primary Service: HFICU  Primary HF Cardiologist: Dr. Cornell  Referring: Dr Cornell     INTERVAL EVENTS / PERTINENT ROS:     Patient continues on cardiac support with Impella 5.5 at P-level 6 and Epinephrine gtt @ 0.02 mcg/kg/min. Her lactate is up-trending to 4.3; therefore, a Dobutamine gtt is initiated @ 2.5 mcg/kg/min for increased support. Hemolysis concerns continue with worsening transaminases and LDH.  CVVH continues with net goal fluid removal of 100 mL/hour. A STAT bedside echo is completed for evaluation of Impella placement.  Her Tacrolimus level is 12.3 (goal 8 - 10). She feels worse today, \"heavy.\"     Plan:  - Dr. Toussaint to ask Dr. Bright to review echo images to determine need to reposition --> Impella repositioned in the afternoon by Dr. Caruso under Echo guided.  Post re-position, lactate down trending to 2.7 from 4.3.   - Continue CRRT net fluid removal of 100 mL/hour  - Adjust Tacrolimus to  1.5 mg / 2.0 mg   - Hold on tunneled dialysis catheter today (IR contacted and revisit tomorrow)  - Initiate Dobutamine gtt @ 2.5 mcg/kg/min     MEDICATIONS  Infusions:  DOBUTamine, Last Rate: 2.5 mcg/kg/min (03/25/24 1034)  EPINEPHrine, Last Rate: 0.02 mcg/kg/min (03/25/24 1000)  heparin Impella Purge 25 units/mL in 500 mL D5W, Last Rate: 10 mL/hr (03/25/24 1000)  PrismaSol 4/2.5, Last Rate: 1,800 mL/hr (03/24/24 1630)      Scheduled:  [Held by provider] aspirin, 81 mg, Daily  calcitriol, 0.5 mcg, Daily  darbepoetin floyd, 100 mcg, q14 days  ferrous sulfate (325 mg ferrous sulfate), 65 mg of iron, Daily with breakfast  folic acid, 1 mg, Daily  insulin lispro, 0-10 Units, Before meals & nightly  levothyroxine, 200 mcg, Daily  melatonin, 10 mg, Nightly  multivitamin with minerals, 1 tablet, Daily  mycophenolate, 180 mg, " "BID  nystatin, 5 mL, q6h  pantoprazole, 40 mg, BID AC  perflutren lipid microspheres, 0.5-10 mL of dilution, Once in imaging  predniSONE, 10 mg, Daily  [Held by provider] rosuvastatin, 40 mg, Nightly  sennosides-docusate sodium, 1 tablet, BID  [Held by provider] sulfamethoxazole-trimethoprim, 80 mg of trimethoprim, Daily  tacrolimus, 1.5 mg, q24h  [START ON 3/26/2024] tacrolimus, 2 mg, q24h  traZODone, 50 mg, Nightly  valGANciclovir, 450 mg, q48h      PRN:  acetaminophen, 975 mg, q8h PRN  benzocaine-menthol, 1 lozenge, q2h PRN  dextrose, 25 g, q15 min PRN  dextrose, 25 g, q15 min PRN   Or  glucagon, 1 mg, q15 min PRN  diphenhydrAMINE, 25 mg, q5 min PRN  glucagon, 1 mg, q15 min PRN  guaiFENesin, 600 mg, BID PRN  HYDROmorphone, 0.2 mg, q2h PRN  artificial tears, 2 drop, PRN  microfibrllar collagen, , PRN  mupirocin, , BID PRN  ondansetron, 4 mg, q4h PRN  oxyCODONE, 5 mg, q4h PRN  polyethylene glycol, 17 g, Daily PRN  sodium chloride, 1 spray, 4x daily PRN        Impella:      Most Recent Range Past 24hrs   Performance Level 6 P Level  Min: 6   Min taken time: 03/25/24 1000  Max: 6   Max taken time: 03/25/24 1000   Flow (L/min) 3.2 Flow (L/min)  Min: 2.9   Min taken time: 03/25/24 0300  Max: 3.5   Max taken time: 03/24/24 1400   Motor Current 332/246 Motor Current  Min: 325/243   Min taken time: 03/24/24 1700  Max: 347/247   Max taken time: 03/24/24 1100   Placement Signal Yes  Placement OK could not be evaluated. This SmartLink does not work with rows of the type: Custom List   Purge (mmHg) 446 Purge Pressure (mmHg)  Min: 407   Min taken time: 03/24/24 1200  Max: 556   Max taken time: 03/24/24 1800   Purge rate (mL/hr) 11.9 Purge Rate (mL/hr)  Min: 11.1   Min taken time: 03/24/24 1700  Max: 12.2   Max taken time: 03/24/24 1400       PHYSICAL EXAM:   Visit Vitals  BP 73/64   Pulse 103   Temp 35.4 °C (95.7 °F) (Temporal)   Resp 24   Ht 1.549 m (5' 0.98\")   Wt 96.9 kg (213 lb 10 oz)   SpO2 (!) 59%   BMI 40.39 kg/m²   OB " Status Hysterectomy   Smoking Status Never   BSA 2.04 m²     Wt Readings from Last 5 Encounters:   03/25/24 96.9 kg (213 lb 10 oz)   12/07/23 92.1 kg (203 lb)   12/01/23 93 kg (205 lb)   11/29/23 92.9 kg (204 lb 12.8 oz)   11/09/23 91.3 kg (201 lb 3.2 oz)     INTAKE/OUTPUT:  I/O last 3 completed shifts:  In: 2397.8 (24.1 mL/kg) [P.O.:1480; I.V.:917.8 (9.2 mL/kg)]  Out: 1702 (17.1 mL/kg) [Urine:10 (0 mL/kg/hr); Other:1572; Stool:120]  Dosing Weight: 99.6 kg        Physical Exam  Constitutional:       General: She is not in acute distress.     Appearance: Normal appearance. She is ill-appearing and toxic-appearing.   HENT:      Head: Normocephalic.      Mouth/Throat:      Mouth: Mucous membranes are moist.   Eyes:      Extraocular Movements: Extraocular movements intact.      Pupils: Pupils are equal, round, and reactive to light.   Neck:      Comments: RIJ dialysis line present - CDI  Cardiovascular:      Rate and Rhythm: Regular rhythm. Tachycardia present.      Pulses: Normal pulses.      Heart sounds: Normal heart sounds.   Pulmonary:      Effort: Pulmonary effort is normal. No respiratory distress.      Breath sounds: Normal breath sounds.   Chest:      Comments: Right axillary impella site CDI   Abdominal:      General: Bowel sounds are normal.      Palpations: Abdomen is soft.      Tenderness: There is no abdominal tenderness.   Musculoskeletal:         General: Normal range of motion.      Cervical back: Normal range of motion.      Right lower leg: Edema present.      Left lower leg: Edema present.   Skin:     General: Skin is warm and dry.      Capillary Refill: Capillary refill takes 2 to 3 seconds.      Coloration: Skin is jaundiced.      Comments: Left groin ECMO cannulation site with drainage    Neurological:      General: No focal deficit present.      Mental Status: She is alert and oriented to person, place, and time.   Psychiatric:         Behavior: Behavior normal. Behavior is cooperative.  "      DATA:  CMP:  Results from last 7 days   Lab Units 03/25/24  0559 03/24/24  0558 03/23/24  2000 03/23/24  0610 03/22/24  0609 03/21/24  0609 03/20/24  0607 03/19/24  0605 03/18/24  1244   SODIUM mmol/L 133*  133* 131* 132* 134* 129* 134* 130* 126*  --    POTASSIUM mmol/L 4.7  4.7 4.5 4.3 3.7 4.5 4.2 3.8 4.2  --    CHLORIDE mmol/L 96*  96* 96* 97* 97* 94* 98 96* 92*  --    CO2 mmol/L 22  22 23 23 23 21 23 23 23  --    ANION GAP mmol/L 20  20 17 16 18 19 17 15 15  --    BUN mg/dL 25*  25* 26* 22 12 25* 14 17 26*  --    CREATININE mg/dL 2.29*  2.29* 3.01* 2.32* 1.55* 3.07* 1.98* 2.38* 3.21*  --    EGFR mL/min/1.73m*2 28*  28* 20* 28* 45* 20* 34* 27* 19*  --    MAGNESIUM mg/dL 2.40 2.31 2.21 1.91 2.12 2.11 2.21 2.53*  --    ALBUMIN g/dL 3.5 3.3* 3.5 3.4 3.4 3.5 3.4 3.3*  --    ALT U/L 275* 220* 198* 157* 86* 59* 52* 53*  --    AST U/L 566* 534* 505* 408* 192* 102* 83* 103*  --    BILIRUBIN TOTAL mg/dL 4.3* 3.1* 2.8* 3.2* 2.8* 1.8* 1.5* 1.6*  --    LIPASE U/L  --   --   --   --   --   --   --   --  46     CBC:  Results from last 7 days   Lab Units 03/25/24  0559 03/24/24  0048 03/23/24  0610 03/22/24  0609 03/21/24  0609 03/20/24  0607 03/19/24  0605   WBC AUTO x10*3/uL 6.7 8.9 10.1 8.2 7.2 7.6 6.3   HEMOGLOBIN g/dL 8.1* 8.3* 8.2* 8.3* 8.1* 7.7* 7.5*   HEMATOCRIT % 25.7* 26.7* 26.3* 25.8* 24.9* 24.5* 22.4*   PLATELETS AUTO x10*3/uL 60* 67* 49* 51* 44* 58* 68*   MCV fL 99 101* 98 93 94 98 90     COAG:   Results from last 7 days   Lab Units 03/25/24  0559 03/24/24  0048 03/23/24  0610 03/22/24  0609 03/21/24  0609 03/20/24  0607 03/19/24  0605   INR  2.8* 2.5* 2.3* 2.0* 1.8* 1.7* 1.9*     ABO:   No results found for: \"ABO\"      HEME/ENDO:           CARDIAC:   Results from last 7 days   Lab Units 03/25/24  0559 03/24/24  0558 03/23/24  2000 03/23/24  1306 03/23/24  0610 03/22/24  0609 03/21/24  0609 03/20/24  0607   LD U/L 1,656* 1,714* 1,786* 1,757* 1,677* 1,126* 958* 1,005*       ASSESSMENT AND PLAN: "   Charla Bowles is a 34 y/o F with PMHx of heart transplant in March 2022 with postoperative course c/b upper extremity/internal jugular DVTs, and 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 She was admitted to HFICU on 2/15 with concern for cardiogenic shock 2/2 allograft rejection and decompensated heart failure with multiorgan dysfunction including significant elevation of liver enzymes and nonoliguric acute kidney injury. Endomyocardial biopsy on 2/16 revealed mild ACR with +CD4s and negative HLAs, however multisystem organ failure persisted with increased inotropic requirements. IABP placed on 2/18. Transferred to CTICU on 2/19 for [facility] ECMO cannulation with Dr. Marina for persistent multi-organ system dysfunction. Intubated 2/21 for respiratory failure 2/2 pulmonary edema, extubated 2/24. ECMO de-cannulated 2/29/24 but had worsening HIRAM requiring CRRT and overall declined clinical status and IABP was transitioned to R axillary impella 5.5 on 3/1. Transferred from CTICU to HFICU on 3/10 for further management. Continued on milrinone gtt @ 0.25 mcg/kg/min and impella support decreased to P-level 5 on 3/11. Completed PLEX/IVIG on 3/13. Went for RHC 3/13: RA: 16, RV: 43/1, PA: 43/12, PCWP: 23, PA-SAT: 47%, CO: 5.14, CI: 2.64 on P5 of Impella 5.5 and milrinone 0.25 mcg/kg/min. Impella was increased to P6 shortly after, and overnight 3/14 patient's mixed venous dropped from 58->32, CXR was ordered and SGC was in appropriate position. Repeat mixed venous confirmed low value of 37. Impella increased to P8 and stat ECHO ordered 3/14. WBC continued to down trend 3/14 which prompted new infectious workup to be sent, and patient to be started on broad spectrum abx. Patient transitioned to tablo from iHD for better volume removal given elevated CVPs 3/14. 03/15: Malpositioned Impella adjusted at the bedside under echo by Dr. Marina and Dr. Melo. Impella pulled back ~ 1 - 2 cm and confirmed placement, pushed back in 1 cm in the  evening by Dr Lewis for placement alarms. Laurel removed 3/16 and P level decreased to 6 due to continued high hemolysis. 3/17: LDH has plateaued @ 2111. CT scan chest, abd, pelvis complete w/o acute ABD. 3/18: Impella purge solution transitioned from sodium bicarb to heparin solution. 3/19: Milrinone gtt discontinued and transitioned to Epinephrine gtt to improve blood pressures. Initiated Myfortic low dose BID, discontinued Sirolimus, increased Tacrolimus to 2 mg BID (goal: 8 - 10). Received dialysis with SLED 3/19 and 3/20. Trialed diuretic challenge 3/21 which was unsuccessful. P level increased to 7 on 3/21 given lactate. 3/23: P level decreased to P6 given labs were concerning for hemolysis. Afternoon dose of myfortic also held given patients abdominal discomfort. Tacrolimus decreased to 2 mg at 1830. 3/24 myfortic was restarted at 180 mg BID. 03/25: Dobutamine initiated @ 2.5 mcg/kg/min for increased cardiac support. STAT Echo completed given worsening labs (lactate, transaminases, LDH) to evaluate positioning of impella.      NEURO:   #Acute Pain   #Insomnia  - C/w tylenol 975 mg q8 PRN for mild pain   - C/w oxycodone 5 mg q4 PRN for moderate pain   - C/w diluadid 0.2 mg q2 PRN for severe pain   - C/w lidocaine patch PRN   - C/w melatonin 10 mg nightly   - C/w trazodone 50 mg nightly for insomnia  - PT/OT   - CAM ICU score q shift  - Sleep/wake cycle hygiene  - Serial neuro and pain assessments     ENT:  #Epistaxis (resolved)  - Significant epistaxis 2/28 and 3/3 requiring ENT consult, packing removed by ENT 3/5  - S/p ocean spray 5x day x10 days completed   - Ocean spray and mupiricin PRN for dry nasal passages     CARDIAC:  #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)  Toxo: -/-   Hep C: -/-     Rejection/Prophylaxis (transplant):  - Steroids: prednisone 10 mg daily   - Tacrolimus: decrease to 2.0 mg PO @ 0630 & 1.5 mg PO @ 1830 w/  daily levels drawn @ 0600   - Sirolimus discontinued 3/19   - Continue Myfortic 180 mg BID given abdominal pain per patient (started 3/19, increased dose 3/21 and patient was experiencing worsening abdominal pain)  - Tacrolimus goal troughs: 8-10, daily level (3/25): 12.3  - Antifungals: nystatin oral suspension 5 mls q6  - Antivirals: valcyte 450 mg q 48 due to low grade viremia   - Holding anti PCP & toxoplasmosis: Bactrim SS daily 2/2 thrombocytopenia (2/23)     Last cardiac biopsy: 2/16/24 with ACR1 and no AMR  Last HLA (2/16/24): negative for DSAs   Last RHC (3/13/24): RA: 16, RV: 43/1, PA: 43/12, PCWP: 23, PA-SAT: 47%, CO: 5.14, CI: 2.64 on P5 of Impella 5.5 and milrinone 0.25 mcg/kg/min   Last LHC (2/18/24): negative for CAV and CAD   Last TTE/BOB (3/22/24): LVEF 15-20%, moderately enlarged RV, severely reduced RV systolic function, impella inflow cannula is visualized in the LV apex, impella tip 4.3 cm from aortic annulus   Osteopenia/osteoporosis prophylaxis: Vitamin D3 and calcium supplements  Peptic/gastric ulcer prophylaxis: Pantoprazole 40 mg daily   CAV Prophylaxis: Holding aspirin 81 mg daily 2/2 thrombocytopenia & holding rosuvastatin 40 mg nightly 2/2 transaminitis     - Unclear cause of acute severe graft dysfunction. Most likely smoldering ACR vs. AMR; however, 2x allograft biopsies on 2/16 & 2/20 remain negative for any significant pathology. Notably, both biopsies taken after rejection therapies implemented which may have reduced areas of graft damage.    - DSAs remain negative; however, patient may have non-HLA antibodies present. 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   - Negative CAV & CAD via LHC on 2/18/24   - Completed methylpred steroid pulse w/ 1g q24 x 3 days (2/16-2/18)  - Thymoglobulin doses: 2/18 & 2/19   - IVIG + PLEX Sessions completed: 2/18, 2/20, 3/7, 3/11, 3/13 (completed)  - Graft function slightly worse after transition to impella 5.5 on 3/1. 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  #Impella 5.5 support (3/1/24)  RHC (3/13/24): RA: 16, RV: 43/1, PA: 43/12, PCWP: 23, PA-SAT: 47%, CO: 5.14, CI: 2.64 on P5 of Impella 5.5, milrinone 0.25 mcg/kg/min, hydralazine 100 mg q8, isordil 40 mg q8  Opening SGC #s (3/13): BP 94/71 (79), PAP 42/30 (35), CVP 13, , CO/CI (kyra) 5.46/2.80, SVO2 56% on P5 of Impella 5.5, milrinone 0.25 mcg/kg/min, hydralazine 100 mg q8, isordil 40 mg q8  Closing SGC #s (3/16): /86 (97), CVP 20, PAP 35/25 (42), SVR 1115, CO/CI (kyra) 5.5/2.8, SVO2 51% on P7 of Impella 5.5, milrinone 0.25 mcg/kg/min, hydralazine 50 mg q8, isordil 20 mg q8  Milrinone discontinued 3/19  - Maintain goal MAP 70-90  - C/w Impella to P6 (last weaned from P7 3/23 2/2 labs more concerning for hemolysis)  - Dr. Toussaint to ask Dr. Bright to review echo images to determine need to reposition --> Impella repositioned in the afternoon by Dr. Caruso under Echo guided.  Post re-position, lactate down  - Transitioned to heparin purge for Impella per Dr. Wright (03/19) -> monitor ACT daily AM  - C/w epinephrine gtt for improved blood pressure to increase fluid removal (3/19)  - Send afternoon lactate  - Volume removal with dialysis    #Advanced therapy evaluation  - Presented to committee 3/12/2024 - concern for end organ failure (possible heart / kidney)   - Not a candidate for transplant at this time per committee discussion 3/12/24  - Discussed in selection committee meeting 03/19 -   (1) likelihood of cardiac recovery s/p graft failure seems less likely as time goes by  (2) she may be a candidate for re-do cardiac transplantation, but she has high risk features (kidney failure, congestion, prior failure of immunosuppression)  (3) per UNOS guidelines she would not be a candidate for combined H/K listing until 6 weeks have  passed since kidney failure episode started.      PULM:   #Acute Hypoxic Respiratory Failure 2/2 pulmonary edema (resolved)  ETT (2/21-2/24)  Remains on RA   - Maintain SpO2 > 92%     GI:   #Abdominal Pain, thought to be 2/2 myfortic dose increase (resolved)  #Nausea  #Emesis (dark blood - clots) (resolved)  Repeat CT chest/abd/pelvis w/o contrast (3/17): no acute abdomen  Patient had emesis x1 with dark blood in vomit -> GI consult placed 3/17 - signed off (protonix gtt for 3 days, d/tri on 3/20)  C diff (3/17): negative  - C/w zofran 4 mg q4 PRN for nausea  - Bowel regimen: Patti-Colace BID + miralax daily PRN   - PPI BID per GI   - Follow up stool samples: H pylori, fecal calprotectin, fecal lactoferrin   - GI following, appreciate assistance     #Hx of gastric bypass   #Hx of MMF colitis  #Acute transaminitis   - Continue PPI, calcitriol 0.5 mg daily, multivitamin  - Trend LFTs daily      :   #Acute Renal Failure 2/2 to cardiorenal syndrome (on dialysis), anuric   Baseline Cr 1.2-1.3  S/p diuretic challenge 3/21 without response   - RFP daily and PRN  - Continue for CVVHD 3/25  - Will need tunneled dialysis line - holding until more stable - will reach out to IR 03/26  - Transplant nephrology following closely, appreciate assistance     ENDO:   #T2DM  Steroid induced hyperglycemia acceptable glycemic control on SSI  - Maintain BG <180 with hypoglycemia protocol  - C/w SSI     #Hypothyroidism  TSH (3/17): 20.74, T4 1.11, T3: 1.4    - C/w synthroid 200 mcg daily      HEME:   #Acute DVT LIJ and SVT left cephalic vein and right cephalic vein   #Iron deficiency anemia  #Acute blood loss anemia   #Multiple transfusions   #Hemolysis   UE and LE Venous duplex (3/6/24): right acute occlusive superficial venous thrombosis visualized in the mid cephalic and acute occlusive superficial venous thrombosis visualized in the distal cephalic veins, left acute non-occlusive deep vein thrombosis visualized in the internal jugular  and acute non-occlusive superficial venous thrombosis visualized in the mid cephalic veins   UE and LE Venous duplex (3/12): unchanged from prior  Last type and screen: 3/15  - 3/15: Transfused 1 unit leukocyte reduced PRBC's   - 3/16: Transfused 1 unit leukocyte reduced PRBC's   - Heparin impella purge check ACT Q daily   - ASA on hold d/t low platelet count   - Continue SCDs for DVT prophylaxis  - Continue Aranesp every 2 weeks  - Vascular medicine signed off 3/13, will need discussion regarding long term anticoagulation closer to discharge   - Consulted hematology regarding DIC / ALBERT: unlikely per hematology (3/17)  - Per hematology, trend daily T/D-bilirubin, LDH, haptoglobin, reticulocyte count, fibrinogen, PT/PTT/INR, D-dimer  - [id number] sent per heme recs: 43- the tacrolimus level has been adjusted recently and there may be a tacro-induced TMA.     ID:   #Leukopenia, likely in the setting of IVIG vs infectious process (resolved)  Blood cultures (2/15): NGTD  Wound culture (3/15): NGTD  S/p vancomycin + zosyn (3/14-3/17)  S/p diflucan 200 mg x1 (3/21) given concern for yeast infection   Afebrile, nontoxic  - Left groin ECMO cannulation site - wound no longer draining - wound care evaluated - No growth from culture   - Trend temp q4h     PHYSICAL AND OCCUPATIONAL THERAPY: ordered and following     LINES:  PIVs  RIJ trialysis catheter 3/5 (discussed line in rounds this AM as patient has no other access)   R Axillary Impella 3/1    DVT: SCDs  VAP BUNDLE: N/A  ULCER PPX: PPI  GLYCEMIC CONTROL: SSI  BOWEL CARE: Patti-colace, miralax    INDWELLING CATHETER: N/A  NUTRITION: NPO Diet; Effective midnight      EMERGENCY CONTACT: Extended Emergency Contact Information  Primary Emergency Contact: SAHIL DIMAS  Address: [address] [place] [address] of [facility]  Home Phone: [phone]  Mobile Phone: [phone]  Relation: Mother  FAMILY UPDATE: Updated daily   CODE STATUS: Full  Code  DISPO: Remain in ICU    Patient seen and assessed with Dr. Toussaint  _________________________________________________  JIMMY Vargas-GOLDIE Diggs/GOLDIE
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
## 2024-03-25 NOTE — PROGRESS NOTES
SOCIAL WORK NOTE   SW met with team for updates. Patient pending review for transplant of kidney and heart in one week. Patient is currently recommended for high intensity therapy.  Social work to follow.   CHARLENE Ocampo, LEIGHANNW-S ([id number])

## 2024-03-26 NOTE — PROGRESS NOTES
"Music Therapy Note    Charla Bowles     Therapy Session  Referral Type: New referral this admission  Visit Type: Follow-up visit  Session Start Time: 1400  Session End Time: 1401  Intervention Delivery: In-person  Conflict of Service: Declined treatment               Treatment/Interventions       Post-assessment  Total Session Time (min): 1 minutes    Narrative  Assessment Detail: Pt declined MT and ATR services \"for a few weeks\". MT and ATR understood and will check in again in a few weeks per request    Education Documentation  No documentation found.          "

## 2024-03-26 NOTE — PROGRESS NOTES
Art Therapy Note    Charla Bowles     Therapy Session  Referral Type: New referral this admission  Visit Type: Follow-up visit  Session Start Time: 1400  Session End Time: 1401  Intervention Delivery: In-person  Conflict of Service: Declined treatment  Number of family members present: 1  Family Present for Session: Parent/Guardian              Treatment/Interventions       Post-assessment  Total Session Time (min): 1 minutes    Narrative  Assessment Detail: Pt was sitting up in bed, with family member at bedside. ATR greeted pt, who declined session for AT.  Follow-up: ATR will continue to f/u with pt, provided she remains admitted.    Education Documentation  No documentation found.

## 2024-03-26 NOTE — PROGRESS NOTES
"Charla Bowles is a 34 y.o. female on day 40 of admission presenting with Cardiogenic shock (CMS/HCC).    Subjective   Pt sitting up in the chair, drowsy however A&Ox3. Intermittently closing eyes during conversation and repeating phrases, stating she does not feel like herself. Emotional support provided. Spoke with pt's mother, Fani, over the phone.    Objective     Physical Exam  Vitals reviewed.   Constitutional:       General: She is awake.      Appearance: She is normal weight. She is ill-appearing.   HENT:      Head: Normocephalic and atraumatic.      Mouth/Throat:      Mouth: Mucous membranes are moist.   Eyes:      Extraocular Movements: Extraocular movements intact.      Pupils: Pupils are equal, round, and reactive to light.   Cardiovascular:      Rate and Rhythm: Regular rhythm. Tachycardia present.   Pulmonary:      Effort: Pulmonary effort is normal.      Breath sounds: Normal breath sounds.   Abdominal:      General: There is distension.      Palpations: Abdomen is soft.   Skin:     General: Skin is warm and dry.   Neurological:      Mental Status: She is oriented to person, place, and time. She is lethargic.   Psychiatric:         Mood and Affect: Affect is angry.         Speech: Speech is delayed.         Behavior: Behavior is cooperative.         Last Recorded Vitals  Blood pressure (!) 158/115, pulse 103, temperature 35.8 °C (96.4 °F), temperature source Temporal, resp. rate 20, height 1.549 m (5' 0.98\"), weight 96 kg (211 lb 10.3 oz), SpO2 91 %.  Intake/Output last 3 Shifts:  I/O last 3 completed shifts:  In: 1516.2 (15.2 mL/kg) [I.V.:1066.2 (10.7 mL/kg); Blood:350; IV Piggyback:100]  Out: 3316 (33.3 mL/kg) [Other:3316]  Dosing Weight: 99.6 kg     Relevant Results  Scheduled medications  [Held by provider] aspirin, 81 mg, oral, Daily  calcitriol, 0.5 mcg, oral, Daily  darbepoetin floyd, 100 mcg, subcutaneous, q14 days  ferrous sulfate (325 mg ferrous sulfate), 65 mg of iron, oral, Daily " with breakfast  folic acid, 1 mg, oral, Daily  insulin lispro, 0-10 Units, subcutaneous, Before meals & nightly  levothyroxine, 200 mcg, oral, Daily  melatonin, 10 mg, oral, Nightly  multivitamin with minerals, 1 tablet, oral, Daily  mycophenolate, 180 mg, oral, BID  nystatin, 5 mL, Swish & Swallow, q6h  pantoprazole, 40 mg, oral, BID AC  perflutren lipid microspheres, 0.5-10 mL of dilution, intravenous, Once in imaging  predniSONE, 10 mg, oral, Daily  [Held by provider] rosuvastatin, 40 mg, oral, Nightly  sennosides-docusate sodium, 1 tablet, oral, BID  [Held by provider] sulfamethoxazole-trimethoprim, 80 mg of trimethoprim, oral, Daily  tacrolimus, 1 mg, oral, q24h  tacrolimus, 2 mg, oral, q24h  traZODone, 50 mg, oral, Nightly  valGANciclovir, 450 mg, oral, q48h      Continuous medications  DOBUTamine, 5 mcg/kg/min (Dosing Weight), Last Rate: 5 mcg/kg/min (03/26/24 0409)  EPINEPHrine, 0-2 mcg/kg/min (Dosing Weight), Last Rate: 0.02 mcg/kg/min (03/26/24 0409)  heparin Impella Purge 25 units/mL in 500 mL D5W, 10 mL/hr, Last Rate: 10 mL/hr (03/26/24 0409)  PrismaSol 4/2.5, 25 mL/kg/hr, Last Rate: 25 mL/kg/hr (03/25/24 1351)      PRN medications  PRN medications: acetaminophen, benzocaine-menthol, dextrose, dextrose **OR** glucagon, diphenhydrAMINE, glucagon, guaiFENesin, HYDROmorphone, artificial tears, microfibrllar collagen, mupirocin, ondansetron, oxyCODONE, polyethylene glycol, sodium chloride    Results for orders placed or performed during the hospital encounter of 02/15/24 (from the past 24 hour(s))   POCT GLUCOSE   Result Value Ref Range    POCT Glucose 180 (H) 74 - 99 mg/dL   BUN   Result Value Ref Range    Urea Nitrogen 22 6 - 23 mg/dL   Creatinine, Serum   Result Value Ref Range    Creatinine 2.02 (H) 0.50 - 1.05 mg/dL    eGFR 33 (L) >60 mL/min/1.73m*2   Electrolyte panel   Result Value Ref Range    Sodium 132 (L) 136 - 145 mmol/L    Potassium 4.6 3.5 - 5.3 mmol/L    Chloride 97 (L) 98 - 107 mmol/L     Bicarbonate 24 21 - 32 mmol/L    Anion Gap 16 10 - 20 mmol/L   Calcium, ionized   Result Value Ref Range    POCT Calcium, Ionized 1.02 (L) 1.1 - 1.33 mmol/L   Phosphorus   Result Value Ref Range    Phosphorus 3.5 2.5 - 4.9 mg/dL   Lactate   Result Value Ref Range    Lactate 2.7 (H) 0.4 - 2.0 mmol/L   Hepatic function panel   Result Value Ref Range    Albumin 3.4 3.4 - 5.0 g/dL    Bilirubin, Total 4.3 (H) 0.0 - 1.2 mg/dL    Bilirubin, Direct 2.6 (H) 0.0 - 0.3 mg/dL    Alkaline Phosphatase 294 (H) 33 - 110 U/L     (H) 7 - 45 U/L     (H) 9 - 39 U/L    Total Protein 6.2 (L) 6.4 - 8.2 g/dL   Lactate Dehydrogenase   Result Value Ref Range    LDH 1,691 (H) 84 - 246 U/L   Anti-Platelet Factor 4 Antibody   Result Value Ref Range    Serum and Platelet Factor 4 0.158 <0.400 OD Units    Interpretation for Anti-Platelet Factor 4 Antibody Negative Negative   Haptoglobin   Result Value Ref Range    Haptoglobin <10 (L) 37 - 246 mg/dL   POCT GLUCOSE   Result Value Ref Range    POCT Glucose 175 (H) 74 - 99 mg/dL   Lactate   Result Value Ref Range    Lactate 2.0 0.4 - 2.0 mmol/L   CBC and Auto Differential   Result Value Ref Range    WBC 6.5 4.4 - 11.3 x10*3/uL    nRBC 10.7 (H) 0.0 - 0.0 /100 WBCs    RBC 2.34 (L) 4.00 - 5.20 x10*6/uL    Hemoglobin 7.2 (L) 12.0 - 16.0 g/dL    Hematocrit 22.0 (L) 36.0 - 46.0 %    MCV 94 80 - 100 fL    MCH 30.8 26.0 - 34.0 pg    MCHC 32.7 32.0 - 36.0 g/dL    RDW 24.7 (H) 11.5 - 14.5 %    Platelets 94 (L) 150 - 450 x10*3/uL    Neutrophils % 83.9 40.0 - 80.0 %    Immature Granulocytes %, Automated 1.4 (H) 0.0 - 0.9 %    Lymphocytes % 6.2 13.0 - 44.0 %    Monocytes % 8.5 2.0 - 10.0 %    Eosinophils % 0.0 0.0 - 6.0 %    Basophils % 0.0 0.0 - 2.0 %    Neutrophils Absolute 5.42 1.20 - 7.70 x10*3/uL    Immature Granulocytes Absolute, Automated 0.09 0.00 - 0.70 x10*3/uL    Lymphocytes Absolute 0.40 (L) 1.20 - 4.80 x10*3/uL    Monocytes Absolute 0.55 0.10 - 1.00 x10*3/uL    Eosinophils Absolute 0.00  0.00 - 0.70 x10*3/uL    Basophils Absolute 0.00 0.00 - 0.10 x10*3/uL   Comprehensive metabolic panel   Result Value Ref Range    Glucose 136 (H) 74 - 99 mg/dL    Sodium 132 (L) 136 - 145 mmol/L    Potassium 4.5 3.5 - 5.3 mmol/L    Chloride 98 98 - 107 mmol/L    Bicarbonate 25 21 - 32 mmol/L    Anion Gap 14 10 - 20 mmol/L    Urea Nitrogen 23 6 - 23 mg/dL    Creatinine 1.94 (H) 0.50 - 1.05 mg/dL    eGFR 35 (L) >60 mL/min/1.73m*2    Calcium 7.4 (L) 8.6 - 10.6 mg/dL    Albumin 3.2 (L) 3.4 - 5.0 g/dL    Alkaline Phosphatase 275 (H) 33 - 110 U/L    Total Protein 5.8 (L) 6.4 - 8.2 g/dL     (H) 9 - 39 U/L    Bilirubin, Total 3.7 (H) 0.0 - 1.2 mg/dL     (H) 7 - 45 U/L   Magnesium   Result Value Ref Range    Magnesium 2.29 1.60 - 2.40 mg/dL   Lactate Dehydrogenase   Result Value Ref Range    LDH 1,571 (H) 84 - 246 U/L   Protime-INR   Result Value Ref Range    Protime 37.0 (H) 9.8 - 12.8 seconds    INR 3.2 (H) 0.9 - 1.1   Prepare RBC: 1 Units, Leukocytes Reduced (CMV reduced risk)   Result Value Ref Range    PRODUCT CODE C9419F42     Unit Number [id number]     Unit ABO O     Unit RH POS     XM INTEP COMP     Dispense Status TR     Blood Expiration Date April 11, 2024 23:59 EDT     PRODUCT BLOOD TYPE 5100     UNIT VOLUME 323    Type and screen   Result Value Ref Range    ABO TYPE O     Rh TYPE POS     ANTIBODY SCREEN NEG    Tacrolimus level   Result Value Ref Range    Tacrolimus  15.0 <=15.0 ng/mL   Coagulation Screen   Result Value Ref Range    Protime 33.3 (H) 9.8 - 12.8 seconds    INR 2.9 (H) 0.9 - 1.1    aPTT 43 (H) 27 - 38 seconds   CBC and Auto Differential   Result Value Ref Range    WBC 6.9 4.4 - 11.3 x10*3/uL    nRBC 16.9 (H) 0.0 - 0.0 /100 WBCs    RBC 2.90 (L) 4.00 - 5.20 x10*6/uL    Hemoglobin 8.9 (L) 12.0 - 16.0 g/dL    Hematocrit 26.3 (L) 36.0 - 46.0 %    MCV 91 80 - 100 fL    MCH 30.7 26.0 - 34.0 pg    MCHC 33.8 32.0 - 36.0 g/dL    RDW 23.9 (H) 11.5 - 14.5 %    Platelets 60 (L) 150 - 450  x10*3/uL    Immature Granulocytes %, Automated 1.4 (H) 0.0 - 0.9 %    Immature Granulocytes Absolute, Automated 0.10 0.00 - 0.70 x10*3/uL   Calcium, Ionized   Result Value Ref Range    POCT Calcium, Ionized 1.00 (L) 1.1 - 1.33 mmol/L   D-dimer, Non VTE   Result Value Ref Range    D-Dimer Non VTE, Quant (ng/mL FEU) 3,948 (H) <=500 ng/mL FEU   Comprehensive metabolic panel   Result Value Ref Range    Glucose 118 (H) 74 - 99 mg/dL    Sodium 133 (L) 136 - 145 mmol/L    Potassium 5.1 3.5 - 5.3 mmol/L    Chloride 98 98 - 107 mmol/L    Bicarbonate 22 21 - 32 mmol/L    Anion Gap 18 10 - 20 mmol/L    Urea Nitrogen 18 6 - 23 mg/dL    Creatinine 1.63 (H) 0.50 - 1.05 mg/dL    eGFR 43 (L) >60 mL/min/1.73m*2    Calcium 7.9 (L) 8.6 - 10.6 mg/dL    Albumin 3.4 3.4 - 5.0 g/dL    Alkaline Phosphatase 270 (H) 33 - 110 U/L    Total Protein 6.1 (L) 6.4 - 8.2 g/dL     (H) 9 - 39 U/L    Bilirubin, Total 3.7 (H) 0.0 - 1.2 mg/dL     (H) 7 - 45 U/L   Fibrinogen   Result Value Ref Range    Fibrinogen 169 (L) 200 - 400 mg/dL   Lactate Dehydrogenase   Result Value Ref Range    LDH 1,548 (H) 84 - 246 U/L   Magnesium   Result Value Ref Range    Magnesium 2.53 (H) 1.60 - 2.40 mg/dL   Phosphorus   Result Value Ref Range    Phosphorus 2.8 2.5 - 4.9 mg/dL   Reticulocytes   Result Value Ref Range    Retic % 8.2 (H) 0.5 - 2.0 %    Retic Absolute 0.238 (H) 0.018 - 0.083 x10*6/uL    Reticulocyte Hemoglobin 35 28 - 38 pg    Immature Retic fraction 40.1 (H) <=16.0 %   Lactate   Result Value Ref Range    Lactate 2.2 (H) 0.4 - 2.0 mmol/L   Bilirubin, Direct   Result Value Ref Range    Bilirubin, Direct 2.1 (H) 0.0 - 0.3 mg/dL   Manual Differential   Result Value Ref Range    Neutrophils %, Manual 93.9 40.0 - 80.0 %    Lymphocytes %, Manual 1.7 13.0 - 44.0 %    Monocytes %, Manual 4.4 2.0 - 10.0 %    Eosinophils %, Manual 0.0 0.0 - 6.0 %    Basophils %, Manual 0.0 0.0 - 2.0 %    Seg Neutrophils Absolute, Manual 6.48 1.20 - 7.00 x10*3/uL     Lymphocytes Absolute, Manual 0.12 (L) 1.20 - 4.80 x10*3/uL    Monocytes Absolute, Manual 0.30 0.10 - 1.00 x10*3/uL    Eosinophils Absolute, Manual 0.00 0.00 - 0.70 x10*3/uL    Basophils Absolute, Manual 0.00 0.00 - 0.10 x10*3/uL    Total Cells Counted 114     RBC Morphology See Below     Polychromasia Mild     Hypochromia Mild     RBC Fragments Few     [place] Cells Many     Acanthocytes Few    POCT GLUCOSE   Result Value Ref Range    POCT Glucose 147 (H) 74 - 99 mg/dL   POCT GLUCOSE   Result Value Ref Range    POCT Glucose 146 (H) 74 - 99 mg/dL     *Note: Due to a large number of results and/or encounters for the requested time period, some results have not been displayed. A complete set of results can be found in Results Review.     XR chest 1 view    Result Date: 3/20/2024  Interpreted By:  Sandra Griffith, STUDY: XR CHEST 1 VIEW;  3/20/2024 7:27 am   INDICATION: Signs/Symptoms:Impella device.   COMPARISON: 03/19/2024   ACCESSION NUMBER(S): [id number]   ORDERING CLINICIAN: KONSTANTIN PERKINS   FINDINGS: Patient is status post median sternotomy. Right subclavian approach Impella device again identified in similar position. Right IJ catheter with the tip in the projection superior vena cava. CardioMEMS device projected over the cardiomediastinal silhouette.       CARDIOMEDIASTINAL SILHOUETTE: Cardiomediastinal silhouette is stable in size and configuration.   LUNGS: The lungs are hypoexpanded with crowding of the bronchovascular markings. Lung fields are essentially clear   ABDOMEN: No remarkable upper abdominal findings.   BONES: No acute osseous changes.       1.  No evidence of acute cardiopulmonary process.       MACRO: None   Signed by: Sandra Griffith 3/20/2024 11:28 AM Dictation workstation:   [id number]    XR chest 1 view    Result Date: 3/19/2024  Interpreted By:  Randy Bardales and Sheng Max STUDY: XR CHEST 1 VIEW;  3/19/2024 8:00 am   INDICATION: Signs/Symptoms:Impella.   COMPARISON: Chest radiograph dated  3/18/2024, 03/17/2024, 03/16/2024 and CT chest abdomen pelvis dated 03/17/2024   ACCESSION NUMBER(S): [id number]   ORDERING CLINICIAN: KONSTANTIN PERKINS   FINDINGS: AP radiograph of the chest     LINES AND DEVICES: Right subclavian approach Impella device project over the left ventricle not significantly changed from prior exam. Right internal jugular approach central venous catheter tip projects over the mid SVC. CardioMEMS device projects over the cardiomediastinal silhouette. Numerous intact median sternotomy wires. Surgical clips project over the right axilla.   CARDIOMEDIASTINAL SILHOUETTE: Stable enlargement the cardiomediastinal silhouette. Aortic knob calcifications are seen.   LUNGS: Unchanged appearance of mild pulmonary edema. Persistent small left and trace right pleural effusion. No focal consolidation or pneumothorax.   ABDOMEN: No remarkable upper abdominal findings.   BONES: No acute osseous abnormality.       1. Right subclavian approach Impella device projects over the expected location of the left ventricle not significant changed from prior exam. 2. Unchanged appearance of mild pulmonary edema. 3. Additional medical devices as detailed above.   I personally reviewed the images/study and I agree with the findings as stated by Dr. Tee Joe. This study was interpreted at [place], [place].   MACRO: None   Signed by: Randy Bardales 3/19/2024 2:21 PM Dictation workstation:   [id number]    XR chest 1 view    Result Date: 3/18/2024  Interpreted By:  Randy Bardales and Sheng Max STUDY: XR CHEST 1 VIEW;  3/18/2024 7:43 am   INDICATION: Signs/Symptoms:Impella.   COMPARISON: Chest radiograph dated 3/17/2024, 03/16/2024, 03/15/2024 and CT chest abdomen pelvis dated 03/17/2024   ACCESSION NUMBER(S): [id number]   ORDERING CLINICIAN: KONSTANTIN PERKINS   FINDINGS: AP radiograph of the chest     LINES AND DEVICES: Right subclavian approach Impella device project over  the left ventricle. Right internal jugular approach central venous catheter tip projects over the mid SVC. CardioMEMS device projects over the cardiomediastinal silhouette. Numerous intact median sternotomy wires. Surgical staples project over the right clavicular region.   CARDIOMEDIASTINAL SILHOUETTE: Stable enlargement of the cardiomediastinal silhouette.   LUNGS: Similar appearance of mild interstitial prominence suggestive of mild pulmonary edema. Persistent small left pleural effusion. No focal consolidation or pneumothorax.   ABDOMEN: No remarkable upper abdominal findings.   BONES: No acute osseous abnormality.       1. Right subclvian approach Impella device projects over the expected location of the left ventricle. 2. Enlargement of the cardiomediastinal silhouette, persistent small left pleural effusion and mild pulmonary edema is compatible with volume overload not significantly changed from prior study     I personally reviewed the images/study and I agree with the findings as stated by Dr. Tee Joe. This study was interpreted at [place], [place].   MACRO: None   Signed by: Randy Bardales 3/18/2024 12:16 PM Dictation workstation:   [id number]    Electrocardiogram, 12-lead PRN ACS symptoms    Result Date: 3/18/2024  Sinus tachycardia Possible Left atrial enlargement Low voltage QRS RSR' or QR pattern in V1 suggests right ventricular conduction delay Cannot rule out Anterior infarct (cited on or before 14-JUN-2023) Abnormal ECG When compared with ECG of 02-MAR-2024 20:41, RSR' pattern in V1 is now Present Questionable change in initial forces of Anteroseptal leads    CT chest abdomen pelvis wo IV contrast    Result Date: 3/17/2024  Interpreted By:  Migel Degroot and Barbat Antonio STUDY: CT CHEST ABDOMEN PELVIS WO CONTRAST;  3/17/2024 2:32 pm   INDICATION: Signs/Symptoms:radiating pain in stomach to back.   COMPARISON: CT chest abdomen and  pelvis without contrast 03/14/2024.   ACCESSION NUMBER(S): [id number]   ORDERING CLINICIAN: CLARICE JOHNSTON   TECHNIQUE: CT of the chest, abdomen and pelvis was performed. Contiguous axial images were obtained at 3 mm slice thickness through the chest, abdomen and pelvis. Coronal and sagittal reconstructions at 3 mm slice thickness were performed.  No intravenous or oral contrast agents were administered.   FINDINGS: Please note that the study is limited without intravenous contrast.   CHEST:   LUNG/PLEURA/LARGE AIRWAYS: Small bilateral pleural effusions with associated bibasilar atelectasis, similar to prior exam. No focal consolidation or pneumothorax.   VESSELS: No evidence of a aortic hematoma. Unable to assess for additional acute aortic pathology in the setting of a noncontrast examination. Aorta and pulmonary arteries are normal caliber.  Atherosclerotic changes of the aorta.  No coronary artery calcifications are present.Right subclavian approach Impella device with the distal tip terminating at the apex of the left ventricle. Right-sided IJ approach hemodialysis catheter, with the distal tip projecting over the expected region of the mid-svc. Interval removal of [place]-Estevan catheter.   HEART: The heart is mildly enlarged, similar to prior exam. Trace pericardial fluid is noted, similar to prior exam.   MEDIASTINUM AND YANE: No mediastinal, hilar or axillary lymph nodes are present. Esophagus: Within normal limits.   CHEST WALL AND LOWER NECK: Unchanged postsurgical changes consistent with median sternotomy. Similar component of moderate body wall anasarca. The visualized thyroid gland appears within normal limits.   ABDOMEN:   LIVER: The liver is enlarged measuring 20.5 cm in craniocaudal dimension.   BILE DUCTS: The bile ducts are not dilated.   GALLBLADDER: The gallbladder is not distended. There hyperdense content noted in the gallbladder, likely to represent sludge or vicarious excretion of contrast  material from previous exam.   PANCREAS: There is mild haziness of the pancreatic head and duodenal pancreatic groove, improved from the previous exam, and limited in assessment given diffuse mesenteric fat stranding. The remainder of the pancreas appears unremarkable. There is no pancreatic ductal dilatation or peripancreatic fluid collections noted.   SPLEEN: Within normal limits.   ADRENAL GLANDS: Diffuse thickening of the left adrenal gland without focal nodule/mass, similar to prior exam   KIDNEYS AND URETERS: The kidneys have normal size and enhance symmetrically.  No hydroureteronephrosis or nephroureterolithiasis is present.   PELVIS:   BLADDER: The urinary bladder is decompressed, limited for evaluation.   REPRODUCTIVE ORGANS: The uterus is surgically absent. Similar appearance of a 3.3 x 2.9 cm hypodense lesion within the right adnexa, which may represent an ovarian cyst.   BOWEL: Redemonstration of postsurgical changes consistent with gastric sleeve bariatric surgery. The small and large bowel are normal in caliber and demonstrate no wall thickening. The appendix is surgically absent.   VESSELS: The aorta and IVC are within normal limits, within the limitations of a noncontrast examination.   PERITONEUM/RETROPERITONEUM/LYMPH NODES: Mild-to-moderate component of simple fluid attenuating abdominopelvic fluid, predominantly within the pelvis. Diffuse haziness of the mesentery, similar to prior exam. No loculated fluid collection. Within limitations of this noncontrast examination, there is no evidence abdominopelvic lymphadenopathy.   ABDOMINAL WALL: Similar component of moderate body wall anasarca. Several injection granulomas within the anterior abdominal wall. Fat containing periumbilical hernia. Redemonstration of a 3.5 cm hyperdense collection in the left inguinal subcutaneous tissue, likely a small hematoma.   BONES: No suspicious osseous lesions are present. Degenerative discogenic disease is noted in  the lower thoracic and lumbar spine.       1. No evidence of aortic hematoma on noncontrast CT. Unable to assess for addition acute aortic pathology in the setting of a noncontrast examination. 2. Incompletely characterized haziness of the pancreatic head and duodenal pancreatic groove, which are limited in assessment given diffuse mesenteric and retroperitoneal fat stranding. However, recommend correlation with lipase to rule out interstitial pancreatitis or groove pancreatitis. There is no pancreatic ductal dilatation or peripancreatic fluid collections noted. 3. Small bilateral pleural effusions with associated bibasilar atelectasis. Moderate diffuse body wall anasarca. Diffuse mesenteric haziness. Mild-to-moderate simple fluid attenuating abdominopelvic ascites. Mild cardiomegaly. These findings are similar to prior exam. Correlate with patient's volume status. 4. Other findings and medical devices as above.     I personally reviewed the images/study and I agree with the findings as stated by Johnathon Joe MD. This study was interpreted at [facility], [place], [place]   MACRO: None   Signed by: Migel Springer 3/17/2024 6:46 PM Dictation workstation:   [id number]    XR chest 1 view    Result Date: 3/17/2024  Interpreted By:  Angelina Cagle, STUDY: XR CHEST 1 VIEW; 3/17/2024 7:30 am   INDICATION: Signs/Symptoms:impella am rounds.   COMPARISON: Radiograph dated 03/16/2024   ACCESSION NUMBER(S): [id number]   ORDERING CLINICIAN: CLARICE JOHNSTON   FINDINGS: Right IJ central venous catheter is stable. Impella device is unchanged in location. Pulmonary artery pressure monitoring device is projecting over the left hilar region. Interval removal of [place]-Estevan catheter.   The cardiac silhouette size is slightly enlarged, stable. Status post median sternotomy.   Left lung base and retrocardiac opacity. No edema or pneumothorax.   No acute osseous abnormality.        1. Small left basilar pleural effusion and left basilar atelectasis, unchanged. 2. Medical devices and postsurgical changes as described above.       Signed by: Emileradhadomo Celsojonah Narayan 3/17/2024 9:24 AM Dictation workstation:   [id number]    Transthoracic Echo (TTE) Limited    Result Date: 3/16/2024   [facility], [address] [place] [address]                Tel [phone] and Fax [phone] TRANSTHORACIC ECHOCARDIOGRAM REPORT  Patient Name:      MIGUEL MONTELONGO JUDIE      Reading Physician:    [id number] Abel Gurrola MD Study Date:        3/15/2024            Ordering Provider:    [id number] ARANZA LOGAN MRN/PID:           [id number]             Fellow: Accession#:        [id number]         Nurse: Date of Birth/Age: 1990 / 34 years  Sonographer:          DESMOND Gender:            F                    Additional Staff: Weight:                                 Admission Status:     Inpatient -                                                               Routine BSA / BMI:         m2 / kg/m2           Encounter#:           [id number]                                         Department Location:  [address] Study Type:    TRANSTHORACIC ECHO (TTE) LIMITED Diagnosis/ICD: Acute systolic (congestive) heart failure (CHF)-I50.21 Indication:    Acute systolic CHF CPT Code:      Echo Limited-33281; Color Doppler-34994  Study Detail: The following Echo studies were performed: 2D and color flow.  PHYSICIAN INTERPRETATION: Left Ventricle: The left ventricular systolic function is severely decreased, with an estimated ejection fraction of 20-25%. There is global hypokinesis of the left ventricle with minor regional variations. The left ventricular cavity size is mild to moderately dilated. Left ventricular diastolic filling was not assessed. Left Atrium: The left atrium is enlarged. Right Ventricle: The right ventricle is mildly enlarged. There  is reduced right ventricular systolic function. Right Atrium: The right atrium is moderately dilated. Aortic Valve: The aortic valve appears structurally normal. There is indeterminate aortic valve regurgitation. Mitral Valve: The mitral valve is normal in structure. There is moderate mitral valve regurgitation. Tricuspid Valve: The tricuspid valve was not well visualized. Tricuspid regurgitation was not assessed. Pulmonic Valve: The pulmonic valve was not assessed. Pulmonic valve regurgitation was not assessed. Pericardium: There is a trivial pericardial effusion. Aorta: The aortic root was not assessed. In comparison to the previous echocardiogram(s): Compared with study from 3/14/2024, no significant change.  LEFT VENTRICULAR ASSIST DEVICE: LVAD: The patient has a(n) Impella LVAD device present. LVAD Comments: Impella tip is 4.2 cm from the AV plane. It is directed posteriorly and interacting with the papillary muscle.  CONCLUSIONS:  1. Left ventricular systolic function is severely decreased with a 20-25% estimated ejection fraction.  2. There is reduced right ventricular systolic function.  3. The left atrium is enlarged.  4. The right atrium is moderately dilated.  5. Moderate mitral valve regurgitation.  6. Compared with study from 3/14/2024, no significant change.  7. There is global hypokinesis of the left ventricle with minor regional variations. QUANTITATIVE DATA SUMMARY:  [id number] Abel Gurrola MD Electronically signed on 3/16/2024 at 3:08:44 PM  ** Final **     XR chest 1 view    Result Date: 3/16/2024  Interpreted By:  Angelina Cagle, STUDY: XR CHEST 1 VIEW; 3/16/2024 7:36 am   INDICATION: Signs/Symptoms:impella.   COMPARISON: Radiograph dated 03/15/2024   ACCESSION NUMBER(S): [id number]   ORDERING CLINICIAN: CLARICE JOHNSTON   FINDINGS: Lower extremity approach [place]-Estevan catheter is in place with the tip projecting over the main pulmonary artery. Stable Impella device and right IJ central  venous catheter.   The cardiac silhouette size is stable. Status post median sternotomy.   Small left basilar pleural effusion and mild interstitial edema, unchanged. No sizable pneumothorax.   No acute osseous abnormality.       1. No significant interval change in a small left pleural effusion and mild interstitial edema. 2. Medical devices as described above       Signed by: Angelina Narayan 3/16/2024 11:29 AM Dictation workstation:   [id number]    XR chest 1 view    Result Date: 3/15/2024  Interpreted By:  Angelina Cagle, STUDY: XR CHEST 1 VIEW; 3/15/2024 8:09 am   INDICATION: Signs/Symptoms:SGC positioning.   COMPARISON: Radiograph dated 03/14/2024   ACCESSION NUMBER(S): [id number]   ORDERING CLINICIAN: JOANNA KEANE   FINDINGS: Lower extremity approach sw[place] Estevan catheter is projecting over the main pulmonary artery. Impella device is stable. Right IJ central venous catheter is in the mid SVC.   Cardiac silhouette size is stable patient is status post median sternotomy.   Interval improvement in pulmonary edema. Small left pleural effusion and left basilar atelectasis. No sizable pneumothorax.   No acute osseous abnormality.       1. Interval improved pulmonary edema plan 2. Small left pleural effusion and left basilar atelectasis. 3. Medical devices and postsurgical changes as described above.       Signed by: Angelina Narayan 3/15/2024 6:57 PM Dictation workstation:   [id number]    CT chest abdomen pelvis wo IV contrast    Result Date: 3/14/2024  Interpreted By:  Chandra Galicia,  Peter Huerta STUDY: CT CHEST ABDOMEN PELVIS WO CONTRAST;  3/14/2024 9:49 am   INDICATION: Signs/Symptoms:Impella placement. 35 y/o  F with Signs/Symptoms:Impella placement.   COMPARISON: CT chest 03/07/2024.   ACCESSION NUMBER(S): [id number]   ORDERING CLINICIAN: ENRIKE WILD   TECHNIQUE: CT of the chest, abdomen and pelvis was performed. Contiguous axial images were obtained at 3 mm slice thickness  through the chest, abdomen and pelvis in 2 mm slice thickness through the chest. Coronal and sagittal reconstructions at 3 mm slice thickness were performed. No intravenous or oral contrast agents were administered.   FINDINGS: Please note that the study is limited without intravenous contrast.   CHEST:   LUNG/PLEURA/LARGE AIRWAYS: Low lung volumes bilaterally. Small to moderate bilateral pleural effusions with adjacent atelectasis. No focal consolidation or pneumothorax.   VESSELS: Right IJ central venous catheter with distal tip at the mid SVC. Right subclavian approach Impella device with distal tip at the apex of the left ventricle. Right femoral approach [place]-Estevan catheter with distal tip at the proximal left pulmonary artery. Aorta and pulmonary arteries are normal caliber.  No atherosclerotic changes of the aorta. No significant atherosclerotic changes of the coronary arteries.   HEART: The heart is enlarged, similar to prior exam. No pericardial effusion   MEDIASTINUM AND YANE: No mediastinal, hilar or axillary lymph nodes are present.  The esophagus appears normal.   CHEST WALL AND LOWER NECK: Median sternotomy wires noted. Mild anasarca. The visualized thyroid gland appears within normal limits.   ABDOMEN:   LIVER: The liver is mildly enlarged measuring 22.2 cm in the craniocaudal direction without focal lesions.   BILE DUCTS: The bile ducts are not dilated.   GALLBLADDER: The gallbladder is not distended and without calcified stones.   PANCREAS: The pancreas appears unremarkable.   SPLEEN: Within normal limits.   ADRENAL GLANDS: Diffuse thickening of the left adrenal gland without focal nodule/mass. The right adrenal gland is not well visualized.   KIDNEYS AND URETERS: The kidneys have normal size.  No hydroureteronephrosis or nephroureterolithiasis is present.   PELVIS:   BLADDER: The urinary bladder is decompressed, limited for evaluation.   REPRODUCTIVE ORGANS: The uterus is surgically absent. There is  a 3.2 x 2.9 cm hypodense lesion in the right adnexa which may represent an ovarian cyst.   BOWEL: Postsurgical changes are noted consistent with prior gastric sleeve bariatric surgery.  The small and large bowel are normal in caliber and demonstrate no wall thickening.  The appendix is surgically absent.   VESSELS: The aorta and IVC are within normal limits.   PERITONEUM/RETROPERITONEUM/LYMPH NODES: Diffuse haziness of the mesentery. Moderate amount of layering ascites, predominantly within the pelvis. No loculated fluid collection. No abdominopelvic lymphadenopathy is present.   ABDOMINAL WALL: Moderate diffuse anasarca. Left and right lower quadrant subcutaneous soft tissue nodules, favored to represent injection granulomas (series 2, image 127, 153, and 163).   BONES: No suspicious osseous lesions are identified.       Chest 1.  Right subclavian approach Impella device with distal tip of the apex of the left ventricle. Other medical devices as above. 2. Low lung volumes bilaterally with small-to-moderate bilateral pleural effusions and adjacent atelectasis.   Abdomen-Pelvis 1.  Diffuse haziness of the mesentery, moderate amount of layering ascites within the abdomen/pelvis, and moderate diffuse anasarca, likely secondary to patient's poor cardiac function. Correlate with patient's volume status.   I personally reviewed the images/study and I agree with the findings as stated by Dr. Bradley Murillo. This study was interpreted at [facility], [place], [place].   MACRO: None   Signed by: Chandra Galicia 3/14/2024 3:38 PM Dictation workstation:   [id number]    Transthoracic Echo (TTE) Limited    Result Date: 3/14/2024   [facility], [address] [place] [address]                Tel [phone] and Fax [phone] TRANSTHORACIC ECHOCARDIOGRAM REPORT  Patient Name:      MIGUEL DIMAS      Reading Physician:    [id number] BASS                                       Tyra MCGILL Study Date:        3/14/2024            Ordering Provider:    [id number] JOANNA KEANE MRN/PID:           [id number]             Fellow:               [id number] Brionna Stiles MD Accession#:        [id number]         Nurse: Date of Birth/Age: 1990 / 34 years  Sonographer:          Raven Arredondo RDCS Gender:            F                    Additional Staff: Height:            152.40 cm            Admit Date:           2/15/2024 Weight:            102.06 kg            Admission Status:     Inpatient -                                                               Routine BSA / BMI:         1.96 m2 / 43.94      Encounter#:           [id number]                    kg/m2                                         Department Location:  [place] [address] MICU Blood Pressure: 114 /86 mmHg Study Type:    TRANSTHORACIC ECHO (TTE) LIMITED Diagnosis/ICD: Cardiogenic shock-R57.0 Indication:    Cardiogenic shock CPT Code:      Echo Limited-09257; Doppler Limited-95398; Color Doppler-85897 Patient History: Pertinent History: Cardiomyopathy, Previous DVT, Palpitations and Dyspnea. Heart                    tx 3/31/22,Heart tx rejection,Cardiogenic                    shock,ESRD,Lupus,Impella 3/1/24. Study Detail: The following Echo studies were performed: 2D, M-Mode, Doppler and               color flow. Technically challenging study due to patient lying in               supine position.  PHYSICIAN INTERPRETATION: Left Ventricle: The left ventricular systolic function is severely decreased, with an estimated ejection fraction of 25-30%. There is global hypokinesis of the left ventricle with minor regional variations. The left ventricular cavity size is normal. Left ventricular diastolic filling was indeterminate. Left Atrium: The left atrium is consistent with transplant. Right Ventricle:  The right ventricle is mildly enlarged. There is mildly reduced right ventricular systolic function. A device is visualized in the right ventricle. Right Atrium: The right atrium is consistent with a transplant. There is a device visualized in the right atrium. Aortic Valve: The aortic valve was not well visualized. There is indeterminate aortic valve regurgitation. Mitral Valve: The mitral valve is normal in structure. There is moderate mitral valve regurgitation which is posteriorly directed. Tricuspid Valve: The tricuspid valve is structurally normal. There is moderate to severe tricuspid regurgitation. The right ventricular systolic pressure is unable to be estimated. Pulmonic Valve: The pulmonic valve is structurally normal. Pulmonic valve regurgitation was not assessed. Pericardium: There is no pericardial effusion noted. Aorta: The aortic root is normal. In comparison to the previous echocardiogram(s): Compared with study from 3/11/2024, the position of the Impella looks similar. There appears to be moderate to severe TR which was not assessed on prior study.  LEFT VENTRICULAR ASSIST DEVICE: LVAD: The patient has a(n) Impella LVAD device present. Outflow Cannula: Visualization of the outflow cannula is technically difficult. LVAD Comments: The inflow cannula is visualized ~ 4.2cm from the aortic valve. The cannula is angled posteriorly within the LV cavity.  CONCLUSIONS:  1. Left ventricular systolic function is severely decreased with a 25-30% estimated ejection fraction.  2. There is mildly reduced right ventricular systolic function.  3. Moderate mitral valve regurgitation.  4. Moderate to severe tricuspid regurgitation visualized.  5. There is global hypokinesis of the left ventricle with minor regional variations. QUANTITATIVE DATA SUMMARY: 2D MEASUREMENTS:                           Normal Ranges: LAs:           4.73 cm    (2.7-4.0cm) IVSd:          1.47 cm    (0.6-1.1cm) LVPWd:         1.27 cm     (0.6-1.1cm) LVIDd:         4.00 cm    (3.9-5.9cm) LVIDs:         3.57 cm LV Mass Index: 102.9 g/m2 LV % FS        10.8 % LV SYSTOLIC FUNCTION BY 2D PLANIMETRY (MOD):                     Normal Ranges: EF-A4C View: 36.1 % (>=55%) EF-A2C View: 45.0 % EF-Biplane:  40.1 % AORTIC VALVE:                        Normal Ranges: LVOT Diameter: 1.88 cm (1.8-2.4cm) TRICUSPID VALVE/RVSP:                             Normal Ranges: Peak TR Velocity: 2.61 m/s RV Syst Pressure: 30.2 mmHg (< 30mmHg)  [id number] Evelyn Mary MD Electronically signed on 3/14/2024 at 2:23:08 PM  ** Final **     XR chest 1 view    Result Date: 3/14/2024  Interpreted By:  Omari Gutierrez, STUDY: XR CHEST 1 VIEW;  3/14/2024 7:20 am   INDICATION: Signs/Symptoms:SGC positioning.   COMPARISON: 03/13/2024   ACCESSION NUMBER(S): [id number]   ORDERING CLINICIAN: JOANNA KEANE   FINDINGS: AP radiograph of the chest was provided.   Impella device entering from the right axillary artery remains in satisfactory position. [place]-Estevan catheter entering via the inferior vena cava is again noted. The tip appears directed toward the right pulmonary artery. Central venous catheter entering via the right jugular vein remains unchanged in position within the superior vena cava above the cavoatrial junction. CardioMEMS device is again noted on the left.   CARDIOMEDIASTINAL SILHOUETTE: Cardiomediastinal silhouette is large but stable in size and configuration.   LUNGS: There is slight recurrence of vascular congestion relative to the previous study. There is no segmental consolidation. The pleural spaces appear clear.   ABDOMEN: No remarkable upper abdominal findings.   BONES: No acute osseous changes.       1.  Mild recurrence of vascular congestion in the interim. No other significant changes.       MACRO: None   Signed by: Omari Gutierrez 3/14/2024 9:33 AM Dictation workstation:   [id number]    XR chest 1 view    Result Date: 3/14/2024  Interpreted By:  Omari Gutierrez,  and  [facility] STUDY: XR CHEST 1 VIEW;  3/13/2024 11:33 pm; 3/13/2024 11:57 pm; 3/13/2024 11:59 pm; 3/13/2024 11:43 pm; 3/13/2024 11:56 pm; 3/13/2024 11:58 pm   INDICATION: Signs/Symptoms:SCG placement check; Signs/Symptoms:SGC placement check.   COMPARISON: 03/13/2024 at 10:41 a.m.   ACCESSION NUMBER(S): [id number]; [id number]; [id number]; [id number]; [id number]; [id number]; [id number]   ORDERING CLINICIAN: ENRIKE WILD   FINDINGS: A series of 7 AP radiographs of the chest were performed between 11:31 PM and 11:59 PM. They are apparently obtained for assessment of [place]-Estevan catheter position. The initial image, accession number concluding with -[id number] is time-stamped 11:31 PM.  On the patient history, that image is entered out of order, and is recorded as having been obtained at 11:56 PM. The subsequent images, beginning with accession number terminating -[id number] appear to be in proper time order.   On the initial image obtained at 11:31 PM, the [place]-Estevan catheter that enters via the inferior vena cava is seen with the tip at the bifurcation of the pulmonary artery. Several subsequent images show it extending further distally within the left pulmonary artery. The final 2 images show the tip at the pulmonary artery bifurcation.   Right IJ approach central venous catheter tip projects over lower SVC. CardioMEMS device is again noted on the left as previously described.   Impella device via right axillary artery remains in satisfactory position.     CARDIOMEDIASTINAL SILHOUETTE: Cardiomediastinal silhouette is enlarged but stable in size and configuration.   LUNGS: No focal consolidation, sizeable pleural effusion or pneumothorax. Since the earlier exam performed at 10:41 AM, there has been improvement in the degree of vascular congestion and edema.   ABDOMEN: No remarkable upper abdominal findings.   BONES: No acute osseous changes.         1. Overall improvement in the appearance of the chest since the  earlier exam performed the morning of this date. 2. [place]-Estevan catheter tip ultimately lies at the bifurcation the main pulmonary artery.     I personally reviewed the images/study and I agree with the findings as stated by resident physician Dr. Gilberto Caban . This study was interpreted at [place], [place].   MACRO: None   Signed by: Omari Gutierrez 3/14/2024 9:16 AM Dictation workstation:   [id number]    XR chest 1 view    Result Date: 3/14/2024  Interpreted By:  Omari Gutierrez,  and Coco Macias STUDY: XR CHEST 1 VIEW;  3/13/2024 11:33 pm; 3/13/2024 11:57 pm; 3/13/2024 11:59 pm; 3/13/2024 11:43 pm; 3/13/2024 11:56 pm; 3/13/2024 11:58 pm   INDICATION: Signs/Symptoms:SCG placement check; Signs/Symptoms:SGC placement check.   COMPARISON: 03/13/2024 at 10:41 a.m.   ACCESSION NUMBER(S): [id number]; [id number]; [id number]; [id number]; [id number]; [id number]; [id number]   ORDERING CLINICIAN: ENRIKE WILD   FINDINGS: A series of 7 AP radiographs of the chest were performed between 11:31 PM and 11:59 PM. They are apparently obtained for assessment of [place]-Estevan catheter position. The initial image, accession number concluding with -[id number] is time-stamped 11:31 PM.  On the patient history, that image is entered out of order, and is recorded as having been obtained at 11:56 PM. The subsequent images, beginning with accession number terminating -[id number] appear to be in proper time order.   On the initial image obtained at 11:31 PM, the [place]-Estevan catheter that enters via the inferior vena cava is seen with the tip at the bifurcation of the pulmonary artery. Several subsequent images show it extending further distally within the left pulmonary artery. The final 2 images show the tip at the pulmonary artery bifurcation.   Right IJ approach central venous catheter tip projects over lower SVC. CardioMEMS device is again noted on the left as previously described.    Impella device via right axillary artery remains in satisfactory position.     CARDIOMEDIASTINAL SILHOUETTE: Cardiomediastinal silhouette is enlarged but stable in size and configuration.   LUNGS: No focal consolidation, sizeable pleural effusion or pneumothorax. Since the earlier exam performed at 10:41 AM, there has been improvement in the degree of vascular congestion and edema.   ABDOMEN: No remarkable upper abdominal findings.   BONES: No acute osseous changes.         1. Overall improvement in the appearance of the chest since the earlier exam performed the morning of this date. 2. [place]-Estevan catheter tip ultimately lies at the bifurcation the main pulmonary artery.     I personally reviewed the images/study and I agree with the findings as stated by resident physician Dr. Gilberto Caban . This study was interpreted at [place], [place].   MACRO: None   Signed by: Omari Gutierrez 3/14/2024 9:16 AM Dictation workstation:   [id number]    XR chest 1 view    Result Date: 3/14/2024  Interpreted By:  Omari Gutierrez,  Nissa Macias STUDY: XR CHEST 1 VIEW;  3/13/2024 11:33 pm; 3/13/2024 11:57 pm; 3/13/2024 11:59 pm; 3/13/2024 11:43 pm; 3/13/2024 11:56 pm; 3/13/2024 11:58 pm   INDICATION: Signs/Symptoms:SCG placement check; Signs/Symptoms:SGC placement check.   COMPARISON: 03/13/2024 at 10:41 a.m.   ACCESSION NUMBER(S): [id number]; [id number]; [id number]; [id number]; [id number]; [id number]; [id number]   ORDERING CLINICIAN: ENRIKE WILD   FINDINGS: A series of 7 AP radiographs of the chest were performed between 11:31 PM and 11:59 PM. They are apparently obtained for assessment of [place]-Estevan catheter position. The initial image, accession number concluding with -[id number] is time-stamped 11:31 PM.  On the patient history, that image is entered out of order, and is recorded as having been obtained at 11:56 PM. The subsequent images, beginning with  accession number terminating -[id number] appear to be in proper time order.   On the initial image obtained at 11:31 PM, the [place]-Estevan catheter that enters via the inferior vena cava is seen with the tip at the bifurcation of the pulmonary artery. Several subsequent images show it extending further distally within the left pulmonary artery. The final 2 images show the tip at the pulmonary artery bifurcation.   Right IJ approach central venous catheter tip projects over lower SVC. CardioMEMS device is again noted on the left as previously described.   Impella device via right axillary artery remains in satisfactory position.     CARDIOMEDIASTINAL SILHOUETTE: Cardiomediastinal silhouette is enlarged but stable in size and configuration.   LUNGS: No focal consolidation, sizeable pleural effusion or pneumothorax. Since the earlier exam performed at 10:41 AM, there has been improvement in the degree of vascular congestion and edema.   ABDOMEN: No remarkable upper abdominal findings.   BONES: No acute osseous changes.         1. Overall improvement in the appearance of the chest since the earlier exam performed the morning of this date. 2. [place]-Estevan catheter tip ultimately lies at the bifurcation the main pulmonary artery.     I personally reviewed the images/study and I agree with the findings as stated by resident physician Dr. Gilberto Caban . This study was interpreted at [place], [place].   MACRO: None   Signed by: Omari Gutierrez 3/14/2024 9:16 AM Dictation workstation:   [id number]    XR chest 1 view    Result Date: 3/14/2024  Interpreted By:  Omari Gutierrez,  Nissa Macias STUDY: XR CHEST 1 VIEW;  3/13/2024 11:33 pm; 3/13/2024 11:57 pm; 3/13/2024 11:59 pm; 3/13/2024 11:43 pm; 3/13/2024 11:56 pm; 3/13/2024 11:58 pm   INDICATION: Signs/Symptoms:SCG placement check; Signs/Symptoms:SGC placement check.   COMPARISON: 03/13/2024 at 10:41 a.m.   ACCESSION  NUMBER(S): [id number]; [id number]; [id number]; [id number]; [id number]; [id number]; [id number]   ORDERING CLINICIAN: ENRIKE WILD   FINDINGS: A series of 7 AP radiographs of the chest were performed between 11:31 PM and 11:59 PM. They are apparently obtained for assessment of [place]-Estevan catheter position. The initial image, accession number concluding with -[id number] is time-stamped 11:31 PM.  On the patient history, that image is entered out of order, and is recorded as having been obtained at 11:56 PM. The subsequent images, beginning with accession number terminating [id number] appear to be in proper time order.   On the initial image obtained at 11:31 PM, the [place]-Estevan catheter that enters via the inferior vena cava is seen with the tip at the bifurcation of the pulmonary artery. Several subsequent images show it extending further distally within the left pulmonary artery. The final 2 images show the tip at the pulmonary artery bifurcation.   Right IJ approach central venous catheter tip projects over lower SVC. CardioMEMS device is again noted on the left as previously described.   Impella device via right axillary artery remains in satisfactory position.     CARDIOMEDIASTINAL SILHOUETTE: Cardiomediastinal silhouette is enlarged but stable in size and configuration.   LUNGS: No focal consolidation, sizeable pleural effusion or pneumothorax. Since the earlier exam performed at 10:41 AM, there has been improvement in the degree of vascular congestion and edema.   ABDOMEN: No remarkable upper abdominal findings.   BONES: No acute osseous changes.         1. Overall improvement in the appearance of the chest since the earlier exam performed the morning of this date. 2. [place]-Estevan catheter tip ultimately lies at the bifurcation the main pulmonary artery.     I personally reviewed the images/study and I agree with the findings as stated by resident physician Dr. Gilberto Caban . This study was interpreted at  [place], [place].   MACRO: None   Signed by: Omari Gutierrez 3/14/2024 9:16 AM Dictation workstation:   [id number]    XR chest 1 view    Result Date: 3/14/2024  Interpreted By:  Omari Gutierrez,  and Coco Macias STUDY: XR CHEST 1 VIEW;  3/13/2024 11:33 pm; 3/13/2024 11:57 pm; 3/13/2024 11:59 pm; 3/13/2024 11:43 pm; 3/13/2024 11:56 pm; 3/13/2024 11:58 pm   INDICATION: Signs/Symptoms:SCG placement check; Signs/Symptoms:SGC placement check.   COMPARISON: 03/13/2024 at 10:41 a.m.   ACCESSION NUMBER(S): [id number]; [id number]; [id number]; [id number]; [id number]; [id number]; [id number]   ORDERING CLINICIAN: ENRIKE WILD   FINDINGS: A series of 7 AP radiographs of the chest were performed between 11:31 PM and 11:59 PM. They are apparently obtained for assessment of [place]-Estevan catheter position. The initial image, accession number concluding with -[id number] is time-stamped 11:31 PM.  On the patient history, that image is entered out of order, and is recorded as having been obtained at 11:56 PM. The subsequent images, beginning with accession number terminating [id number] appear to be in proper time order.   On the initial image obtained at 11:31 PM, the [place]-Estevan catheter that enters via the inferior vena cava is seen with the tip at the bifurcation of the pulmonary artery. Several subsequent images show it extending further distally within the left pulmonary artery. The final 2 images show the tip at the pulmonary artery bifurcation.   Right IJ approach central venous catheter tip projects over lower SVC. CardioMEMS device is again noted on the left as previously described.   Impella device via right axillary artery remains in satisfactory position.     CARDIOMEDIASTINAL SILHOUETTE: Cardiomediastinal silhouette is enlarged but stable in size and configuration.   LUNGS: No focal consolidation, sizeable pleural effusion or pneumothorax. Since the earlier exam performed  at 10:41 AM, there has been improvement in the degree of vascular congestion and edema.   ABDOMEN: No remarkable upper abdominal findings.   BONES: No acute osseous changes.         1. Overall improvement in the appearance of the chest since the earlier exam performed the morning of this date. 2. [place] catheter tip ultimately lies at the bifurcation the main pulmonary artery.     I personally reviewed the images/study and I agree with the findings as stated by resident physician Dr. Gilberto Caban . This study was interpreted at [place], [place].   MACRO: None   Signed by: Omari Gutierrez 3/14/2024 9:16 AM Dictation workstation:   [id number]    XR chest 1 view    Result Date: 3/14/2024  Interpreted By:  Omari Gutierrez,  Nissa Macias STUDY: XR CHEST 1 VIEW;  3/13/2024 11:33 pm; 3/13/2024 11:57 pm; 3/13/2024 11:59 pm; 3/13/2024 11:43 pm; 3/13/2024 11:56 pm; 3/13/2024 11:58 pm   INDICATION: Signs/Symptoms:SCG placement check; Signs/Symptoms:SGC placement check.   COMPARISON: 03/13/2024 at 10:41 a.m.   ACCESSION NUMBER(S): [id number]; [id number]; [id number]; [id number]; [id number]; [id number]; [id number]   ORDERING CLINICIAN: ENRIKE WILD   FINDINGS: A series of 7 AP radiographs of the chest were performed between 11:31 PM and 11:59 PM. They are apparently obtained for assessment of [place] catheter position. The initial image, accession number concluding with [id number] is time-stamped 11:31 PM.  On the patient history, that image is entered out of order, and is recorded as having been obtained at 11:56 PM. The subsequent images, beginning with accession number terminating [id number] appear to be in proper time order.   On the initial image obtained at 11:31 PM, the [place] catheter that enters via the inferior vena cava is seen with the tip at the bifurcation of the pulmonary artery. Several subsequent images show it extending further distally  within the left pulmonary artery. The final 2 images show the tip at the pulmonary artery bifurcation.   Right IJ approach central venous catheter tip projects over lower SVC. CardioMEMS device is again noted on the left as previously described.   Impella device via right axillary artery remains in satisfactory position.     CARDIOMEDIASTINAL SILHOUETTE: Cardiomediastinal silhouette is enlarged but stable in size and configuration.   LUNGS: No focal consolidation, sizeable pleural effusion or pneumothorax. Since the earlier exam performed at 10:41 AM, there has been improvement in the degree of vascular congestion and edema.   ABDOMEN: No remarkable upper abdominal findings.   BONES: No acute osseous changes.         1. Overall improvement in the appearance of the chest since the earlier exam performed the morning of this date. 2. [place] catheter tip ultimately lies at the bifurcation the main pulmonary artery.     I personally reviewed the images/study and I agree with the findings as stated by resident physician Dr. Gilberto Caban . This study was interpreted at [place], [place].   MACRO: None   Signed by: Omari Gutierrez 3/14/2024 9:16 AM Dictation workstation:   [id number]    XR chest 1 view    Result Date: 3/14/2024  Interpreted By:  Omari Gutierrez and Afshari Mirak Sohrab STUDY: XR CHEST 1 VIEW;  3/13/2024 11:33 pm; 3/13/2024 11:57 pm; 3/13/2024 11:59 pm; 3/13/2024 11:43 pm; 3/13/2024 11:56 pm; 3/13/2024 11:58 pm   INDICATION: Signs/Symptoms:SCG placement check; Signs/Symptoms:SGC placement check.   COMPARISON: 03/13/2024 at 10:41 a.m.   ACCESSION NUMBER(S): [id number]; [id number]; [id number]; [id number]; [id number]; [id number]; [id number]   ORDERING CLINICIAN: ENRIKE WILD   FINDINGS: A series of 7 AP radiographs of the chest were performed between 11:31 PM and 11:59 PM. They are apparently obtained for assessment of [place] catheter  position. The initial image, accession number concluding with -[id number] is time-stamped 11:31 PM.  On the patient history, that image is entered out of order, and is recorded as having been obtained at 11:56 PM. The subsequent images, beginning with accession number terminating [id number] appear to be in proper time order.   On the initial image obtained at 11:31 PM, the [place] catheter that enters via the inferior vena cava is seen with the tip at the bifurcation of the pulmonary artery. Several subsequent images show it extending further distally within the left pulmonary artery. The final 2 images show the tip at the pulmonary artery bifurcation.   Right IJ approach central venous catheter tip projects over lower SVC. CardioMEMS device is again noted on the left as previously described.   Impella device via right axillary artery remains in satisfactory position.     CARDIOMEDIASTINAL SILHOUETTE: Cardiomediastinal silhouette is enlarged but stable in size and configuration.   LUNGS: No focal consolidation, sizeable pleural effusion or pneumothorax. Since the earlier exam performed at 10:41 AM, there has been improvement in the degree of vascular congestion and edema.   ABDOMEN: No remarkable upper abdominal findings.   BONES: No acute osseous changes.         1. Overall improvement in the appearance of the chest since the earlier exam performed the morning of this date. 2. [place] catheter tip ultimately lies at the bifurcation the main pulmonary artery.     I personally reviewed the images/study and I agree with the findings as stated by resident physician Dr. Gilberto Caban . This study was interpreted at [facility], [place], [place].   MACRO: None   Signed by: Omari Gutierrez 3/14/2024 9:16 AM Dictation workstation:   [id number]    Upper extremity venous duplex bilateral    Result Date: 3/13/2024            [place] [address]    Tel [phone] and Fax [phone]  Vascular Lab Report [facility] US UPPER EXTREMITY VENOUS DUPLEX BILATERAL  Patient Name:     MIGUEL DIMAS      Reading           [id number] Rhianna BASS                Physician: Study Date:       3/12/2024            Ordering          [id number] CLARICE WILL                                        Physician:        VICKIE MRN/PID:          [id number]             Technologist:     Chio Amaro RVT Accession#:       [id number]         Technologist 2: Date of           1990 / 34 years  Encounter#:       [id number] Birth/Age: Gender:           F Admission Status: Inpatient            Location          [facility]                                        Performed:  Diagnosis/ICD: Localized (leg) edema-R60.0 CPT Codes:     87338 Peripheral venous duplex scan for DVT complete  **CRITICAL RESULT** Critical Result: New finding: 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  CONCLUSIONS: Right Upper Venous: No evidence of acute deep vein thrombus visualized in the right upper extremity. There is acute occlusive superficial venous thrombosis visualized in the mid cephalic vein. There are chronic changes visualized in the internal jugular vein. Line and Impella device in subclavian vein. Additional Findings; IV Line. Left Upper Venous: There is acute non-occlusive deep vein thrombosis visualized in the internal jugular vein. Acute superficial vein thrombosis in the mid cephalic vein.  Comparison: Compared with study from 3/4/2024, there are chronic changes in the right internal jugular vein.  Imaging & Doppler Findings:  Right               Compressible    Thrombus       Flow Internal Jugular      Partial        Chronic     Pulsatile Subclavian                                       Pulsatile Subclavian Proximal                   None Subclavian Mid          Yes            None       Pulsatile Subclavian Distal       Yes           None       Pulsatile Axillary                Yes           None       Pulsatile Brachial                Yes           None Cephalic                 No      Acute occlusive Basilic                 Yes           None  Left                Compress      Thrombus         Flow Internal Jugular    Partial  Acute non-occlusive Pulsatile Subclavian Proximal                 None         Pulsatile Subclavian Mid        Yes           None         Pulsatile Subclavian Distal     Yes           None         Pulsatile Axillary              Yes           None         Pulsatile Brachial              Yes           None Cephalic               No      Acute occlusive Basilic               Yes           None  [id number] Rhianna Dumont MD Electronically signed by [id number] Rhianna Dumont MD on 3/13/2024 at 9:04:03 PM  ** Final **     Lower extremity venous duplex bilateral    Result Date: 3/13/2024            [place] [address]   Tel [phone] and Fax [phone]  Vascular Lab Report VASC US LOWER EXTREMITY VENOUS DUPLEX BILATERAL  Patient Name:     MIGUEL DIMAS      Reading           [id number] Rhianna Dumont MD                   [facility] [place] [facility]                Physician: Study Date:       3/12/2024            Ordering          [id number] CLARICE WILL                                        Physician:        VICKIE MRN/PID:          [id number]             Technologist:     Karley Vines T Accession#:       [id number]         Technologist 2: Date of           1990 / 34 years  Encounter#:       [id number] Birth/Age: Gender:           F Admission Status: Outpatient           Location          [facility]                                        Performed:  Diagnosis/ICD: Localized (leg) edema-R60.0 CPT Codes:     37595 Peripheral venous duplex scan for DVT complete  CONCLUSIONS: Right Lower Venous: No evidence of acute deep vein thrombus  visualized in the right lower extremity. Left Lower Venous: No evidence of acute deep vein thrombus visualized in the left lower extremity. Cystic structure noted in left groin measuring 1.79 cm x 1.08 cm.  Imaging & Doppler Findings:  Right                 Compressible Thrombus        Flow Distal External Iliac     Yes        None       Pulsatile CFV                       Yes        None       Pulsatile PFV                       Yes        None FV Proximal               Yes        None       Pulsatile FV Mid                    Yes        None FV Distal                 Yes        None Popliteal                 Yes        None   Spontaneous/Phasic Peroneal                  Yes        None PTV                       Yes        None  Left                  Compress Thrombus        Flow Distal External Iliac   Yes      None       Pulsatile CFV                     Yes      None       Pulsatile PFV                     Yes      None FV Proximal             Yes      None       Pulsatile FV Mid                  Yes      None FV Distal               Yes      None Popliteal               Yes      None   Spontaneous/Phasic Peroneal                Yes      None PTV                     Yes      None  [id number] Rhianna Dumont MD Electronically signed by [id number] Rhianna Dumont MD on 3/13/2024 at 8:59:50 PM  ** Final **     XR chest 1 view    Result Date: 3/13/2024  Interpreted By:  Omari Gutierrez, STUDY: XR CHEST 1 VIEW;  3/13/2024 7:42 am   INDICATION: Signs/Symptoms:PA catheter - ADHF.   COMPARISON: 03/11/2024   ACCESSION NUMBER(S): [id number]   ORDERING CLINICIAN: KONSTANTIN PERKINS   FINDINGS: AP radiograph of the chest was provided.   Impella device entering via the right axillary artery remains in good position. Central venous catheter entering via the right jugular vein is unchanged. CardioMEMS device is visible on the left as previously noted.   CARDIOMEDIASTINAL SILHOUETTE: Cardiomediastinal silhouette is enlarged but stable in size  and configuration.   LUNGS: There is slight prominence of the pulmonary vascularity compared to the previous study that may indicate mild overload. Slight accentuation of the interstitial markings suggest mild edema.   ABDOMEN: No remarkable upper abdominal findings.   BONES: No acute osseous changes.       1.  Findings suggesting mild overload and developing edema.       MACRO: None   Signed by: Omari Gutierrez 3/13/2024 4:51 PM Dictation workstation:   [id number]    Cardiac Catheterization Procedure    Result Date: 3/13/2024  Recommendations:     Cardiac Catheterization Procedure    Result Date: 3/13/2024   [facility], Cath Lab, [address] [place] [address] Cardiovascular Catheterization Report Patient Name:      MIGUEL NAILSSIMONE VILAENS Performing Physician:  [id number] Orellana MD Study Date:        3/13/2024             Verifying Physician:   Priya Orellana MD MRN/PID:           [id number]              Cardiologist/Co-scrub: Accession#:        [id number]          Ordering Provider:     [id number] CLARICE JONHSTON Date of Birth/Age: 1990 / 34 years   Fellow: Gender:            F                     Fellow: Encounter#:        [id number]  Study: Right Heart Cath  Indications: Heart failure and cardiomyopathy. Heart failure.  Procedure Description: After infiltration with 2% Lidocaine, the was cannulated with a modified Seldinger technique. After infiltration of local anesthetic, the right femoral vein was identified with two-dimensional ultrasound. Under direct ultrasound visualization, the right femoral vein was cannulated with a micropuncture technique. A 9 [place] sheath was placed in the vein. A balloon tipped catheter was advanced through the right heart to record pressures. Cardiac output was  calculated via the Shreyas method.  Right Heart Catheterization: A balloon tipped catheter was advanced through the right heart to record pressures. Cardiac output was calculated via the Shreyas method. Elevated left sided filling pressures with normal cardiac output. Elevated ventricular filling pressure. Cardiac output is normal. No evidence of shunt. No pulmonary vascular resistance.  Hemo Personnel: +---------------+---------+ Name           Duty      +---------------+---------+ Sourav Orellana MD 1 +---------------+---------+  Hemodynamic Pressures:  +----+----------+---------+-------------+-------------+---+----+-------+-------+ SiteDate Time   Phase  Systolic mmHg  Diastolic  ED MeanA-Wave V-Wave                  Name                    mmHg     mmHmmHg mmHg   mmHg                                                     g                    +----+----------+---------+-------------+-------------+---+----+-------+-------+   AO 3/13/2024     Rest          125           86     97                   9:16:03 AM                                                         +----+----------+---------+-------------+-------------+---+----+-------+-------+   RA 3/13/2024     Rest                               16     23     22     9:32:42 AM                                                         +----+----------+---------+-------------+-------------+---+----+-------+-------+   RV 3/13/2024     Rest           43            1 14                       9:35:09 AM                                                         +----+----------+---------+-------------+-------------+---+----+-------+-------+   PW 3/13/2024     Rest                               23     25     31     9:36:57 AM                                                         +----+----------+---------+-------------+-------------+---+----+-------+-------+   PA  3/13/2024     Rest           43           12     28                   9:37:20 AM                                                         +----+----------+---------+-------------+-------------+---+----+-------+-------+  Oxygen Saturation %: +-----------+----------+------------+ Sample SiteO2 Sat (%)HB (g/100ml) +-----------+----------+------------+          FA        96         7.4 +-----------+----------+------------+          RA        46         7.4 +-----------+----------+------------+          FA        96         7.4 +-----------+----------+------------+          PA        47         7.4 +-----------+----------+------------+  Cardiac Outputs: +---------------+------------------+-------+ LISA CO (l/min)LISA CI (l/min/m2)LISA SV +---------------+------------------+-------+             5.1               2.6   38.6 +---------------+------------------+-------+  Vascular Resistance Calculated Values (Wood Units): +-----+---+----+-------+----+----+---+----+----+----+-------+ PhasePVRPVRIPVR/SVRSVR SVRITPRTPRITVR TVRITPR/TVR +-----+---+----+-------+----+----+---+----+----+----+-------+ 0    1.01.8 0      [id number]       +-----+---+----+-------+----+----+---+----+----+----+-------+  Complications: No in-lab complications observed.  Cardiac Cath Post Procedure Notes: Post Procedure Diagnosis: Heart failure. Blood Loss:               Estimated blood loss during the procedure was 0 mls. Specimens Removed:        Number of specimen(s) removed: none.  Recommendations: Maximize medical therapy. ____________________________________________________________________________________ CONCLUSIONS:  1. Acute on chronic decompensated systolic heart failure. ICD 10 Codes: Acute systolic (congestive) heart failure (CHF)-I50.21  CPT Codes: Right Heart Cath O2/Cardiac output without biopsy (RHC)-90484  [id number] Sourav Orellana MD Performing Physician  Electronically signed by [id number] Sourav Orellana MD on 3/13/2024 at 3:12:03 PM  ** Final **     XR chest 1 view    Result Date: 3/13/2024  Interpreted By:  Omari Gutierrez, STUDY: XR CHEST 1 VIEW;  3/13/2024 10:41 am   INDICATION: Signs/Symptoms:SGC positioning.   COMPARISON: 03/13/2024 at 7:26 a.m.   ACCESSION NUMBER(S): [id number]   ORDERING CLINICIAN: JOANNA KEANE   FINDINGS: AP radiograph of the chest performed at 10:34 a.m. was provided.   A [place]-Estev[place] catheter has been placed via the inferior vena cava. The tip of the catheter appears to lie within the main pulmonary artery. I am uncertain whether it is directed toward the left or the right pulmonary artery.   Impella device entering via the right axillary artery is again noted and appears in satisfactory position. Right jugular venous catheter tip is unchanged in position. CardioMEMS device is again visible on the left.   CARDIOMEDIASTINAL SILHOUETTE: Cardiomediastinal silhouette is large but stable in size and configuration.   LUNGS: There is increasing prominence of the pulmonary vascularity suggesting increased overload. Accentuation of the interstitial markings consistent with edema also appears to have increased slightly in the interim.   ABDOMEN: No remarkable upper abdominal findings.   BONES: No acute osseous changes.       1.  [place] catheter placement as described above. Increasing vascular congestion and edema.       MACRO: None   Signed by: Omari Gutierrez 3/13/2024 11:16 AM Dictation workstation:   [id number]    CT chest wo IV contrast    Result Date: 3/12/2024  Interpreted By:  Alton Piper, STUDY: CT CHEST WO IV CONTRAST;  3/7/2024 10:07 am   INDICATION: Signs/Symptoms:heart transplant workup.   COMPARISON: 2 02/2022   ACCESSION NUMBER(S): [id number]   ORDERING CLINICIAN: BEULAH MCGOVERN   TECHNIQUE: Helical data acquisition of the chest was obtained  without IV contrast material.  Images were reformatted in axial, coronal, and  sagittal planes.   FINDINGS: LUNGS AND AIRWAYS: The trachea and central airways are patent. No endobronchial lesion.   Evaluation of the lung parenchyma demonstrates left basilar atelectasis and small left-sided pleural effusion. There are no suspicious lung nodules.   MEDIASTINUM AND YANE, LOWER NECK AND AXILLA: The visualized thyroid gland is within normal limits.   Scattered mediastinal lymph nodes are felt to be reactive in nature.   Esophagus appears within normal limits as seen.   HEART AND VESSELS: The thoracic aorta is of normal course and caliber without vascular calcifications. Postoperative changes consistent with heart transplant. The patient is status post right subclavian Impella device placement with tip overlying the left ventricle.   Main pulmonary artery and its branches are normal in caliber.   No coronary artery calcifications are seen. The study is not optimized for evaluation of coronary arteries.   There is global chamber enlargement. Left ventricular Impella device in place.   No evidence of pericardial effusion.   UPPER ABDOMEN: Contrast in nondistended loops of bowel.   CHEST WALL AND OSSEOUS STRUCTURES: There are no suspicious osseous lesions. Multilevel degenerative changes are present. There is right axillary soft tissue edema with axillary subcutaneous emphysema consistent with recent Impella device placement. Right axillary clips in place consistent with recent Impella device placement.   Patient is status post median sternotomy.       1.  Postoperative changes consistent with previous heart transplant, Impella device placement and median sternotomy. 2. Postoperative changes with right axillary soft tissue/fluid consistent with recent Impella device placement. 3. Mild bibasilar atelectasis with small left-sided effusion.   MACRO: None   Signed by: Alton Piper 3/12/2024 10:24 AM Dictation workstation:   [id number]    Transthoracic Echo (TTE) Limited    Result Date: 3/11/2024    [facility], [address] [place] [address]                Tel [phone] and Fax [phone] TRANSTHORACIC ECHOCARDIOGRAM REPORT  Patient Name:      MIGUEL DIMAS      Jax Physician:    [id number] Patrica BASS MD Study Date:        3/11/2024            Ordering Provider:    [id number] MERLINE JACKSON                                                               HEAD MRN/PID:           [id number]             Fellow: Accession#:        [id number]         Nurse: Date of Birth/Age: 1990 / 34 years  Sonographer:          Kingsley Morrell                                                               [facility] Gender:            F                    Additional Staff: Height:            154.94 cm            Admit Date:           2/15/2024 Weight:            97.01 kg             Admission Status:     Inpatient -                                                               Routine BSA / BMI:         1.94 m2 / 40.41      Encounter#:           [id number]                    kg/m2                                         Department Location:  [address] Blood Pressure: 96 /7 mmHg Study Type:    TRANSTHORACIC ECHO (TTE) LIMITED Diagnosis/ICD: Cardiogenic shock-R57.0 Indication:    Shock, s/p right heart cath, limited for function & MR CPT Code:      Echo Limited-08354; Echo Complete w Full Doppler-79935; Doppler                Limited-17354 Patient History: Pertinent History: 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 Study Detail: The following Echo studies were performed: 2D, M-Mode, Doppler and               color flow. Technically challenging study due to body habitus and               poor acoustic windows. Definity used as a contrast agent for               endocardial border definition. Total contrast used for this               procedure was 1.5 mL via IV push.  PHYSICIAN INTERPRETATION:  Left Ventricle: The left ventricular systolic function is moderately to severely decreased, with an estimated ejection fraction of 25-30%. There is global hypokinesis of the left ventricle with minor regional variations. The left ventricular cavity size is normal. The left ventricular septal wall thickness is mildly increased. There is mildly increased left ventricular posterior wall thickness. Left ventricular diastolic filling was indeterminate. Left Atrium: The left atrium is mildly dilated. Right Ventricle: The right ventricle is normal in size. There is reduced right ventricular systolic function. Right Atrium: The right atrium is mildly dilated. Aortic Valve: The aortic valve was not well visualized. There is indeterminate aortic valve regurgitation. Mitral Valve: The mitral valve is normal in structure. There is moderate mitral valve regurgitation. Tricuspid Valve: The tricuspid valve is structurally normal. Tricuspid regurgitation was not assessed. Pulmonic Valve: The pulmonic valve is not well visualized. Pulmonic valve regurgitation was not assessed. Pericardium: There is a trivial to small pericardial effusion. Aorta: The aortic root was not well visualized. In comparison to the previous echocardiogram(s): Compared with the prior exam from 3/4/2024 there was already an Impella LVAD in place. The LV systolic function appears similar or slightly less dynamic today. The MR appears to still be moderat. The RVSP was not assessed today.  LEFT VENTRICULAR ASSIST DEVICE: LVAD: The patient has a(n) Impella LVAD device present. LVAD Comments: There is an Impella in placve which is angled posteriorly within the LV cavity.  CONCLUSIONS:  1. Left ventricular systolic function is moderately to severely decreased with a 25-30% estimated ejection fraction.  2. There is reduced right ventricular systolic function.  3. Moderate mitral valve regurgitation.  4. Compared with the prior exam from 3/4/2024 there was already an  Impella LVAD in place. The LV systolic function appears similar or slightly less dynamic today. The MR appears to still be moderat. The RVSP was not assessed today.  5. There is global hypokinesis of the left ventricle with minor regional variations. QUANTITATIVE DATA SUMMARY: 2D MEASUREMENTS:                           Normal Ranges: IVSd:          1.40 cm    (0.6-1.1cm) LVPWd:         1.30 cm    (0.6-1.1cm) LVIDd:         4.40 cm    (3.9-5.9cm) LV Mass Index: 117.0 g/m2 LA VOLUME:                               Normal Ranges: LA Vol A4C:        67.6 ml    (22+/-6mL/m2) LA Vol A2C:        83.8 ml LA Vol BP:         75.8 ml LA Vol Index A4C:  34.8ml/m2 LA Vol Index A2C:  43.1 ml/m2 LA Vol Index BP:   39.0 ml/m2 LA Area A4C:       24.1 cm2 LA Area A2C:       27.0 cm2 LA Major Axis A4C: 7.3 cm LA Major Axis A2C: 7.4 cm LA Volume Index:   39.1 ml/m2 RA VOLUME BY A/L METHOD:                       Normal Ranges: RA Area A4C: 19.0 cm2 LV DIASTOLIC FUNCTION:                        Normal Ranges: MV Peak E:    1.23 m/s (0.7-1.2 m/s) MV e'         0.11 m/s (>8.0) MV lateral e' 0.16 m/s MV medial e'  0.06 m/s E/e' Ratio:   11.18    (<8.0) MITRAL VALVE:                 Normal Ranges: MV DT: 108 msec (150-240msec)  RIGHT VENTRICLE: RV Basal 4.10 cm RV Mid   3.00 cm RV Major 7.3 cm RV s'    0.09 m/s  [id number] Patrica Aguilera MD Electronically signed on 3/11/2024 at 6:17:02 PM  ** Final **     XR chest 1 view    Result Date: 3/11/2024  Interpreted By:  Omari Gutierrez, STUDY: XR CHEST 1 VIEW;  3/11/2024 7:47 am   INDICATION: Signs/Symptoms:daily.   COMPARISON: 03/10/2024   ACCESSION NUMBER(S): [id number]   ORDERING CLINICIAN: BEULAH BOUCHAHINE   FINDINGS: AP radiograph of the chest was provided.   Impella device entering via the right axillary artery remains unchanged in satisfactory position. Central venous catheter entering via the right jugular vein is unchanged in position. CardioMEMS device is again visible on the left.    CARDIOMEDIASTINAL SILHOUETTE: Cardiomediastinal silhouette is stable in size and configuration.   LUNGS: Minimal basal atelectasis on the left side is again noted. The lungs show no other areas of opacity. Minimal left pleural fluid collection is seen.   ABDOMEN: No remarkable upper abdominal findings.   BONES: No acute osseous changes.       1.  Essentially stable appearance of the chest.       MACRO: None   Signed by: Omari Gutierrez 3/11/2024 12:17 PM Dictation workstation:   [id number]    Pulmonary function testing    Result Date: 3/11/2024  The FEV1/FVC is normal. The FEV1 is mildly reduced. The FVC is moderately reduced, suggesting restrictive impairment.   However, the presence of restriction should be confirmed by measurement of lung volumes. Conclusion: Spirometry shows no obstruction, however it suggests a possible restrictive ventilatory impairment or non-specific pattern. Recommend obtaining lung volumes to confirm.    XR chest 1 view    Result Date: 3/10/2024  Interpreted By:  Alton Piper, STUDY: XR CHEST 1 VIEW; 3/10/2024 4:14 am   INDICATION: Signs/Symptoms:daily.   COMPARISON: 03/09/2024.   ACCESSION NUMBER(S): [id number]   ORDERING CLINICIAN: BEULAH MCGOVERN   FINDINGS: Right IJ line in place.   CARDIOMEDIASTINAL SILHOUETTE: Patient is status post median sternotomy. Impella device in place. CardioMEMS device overlies the left pulmonary artery.   LUNGS: No focal airspace disease. Minimal left basilar atelectasis.   ABDOMEN: No remarkable upper abdominal findings.   BONES: No acute osseous changes.       1.  Impella device in place. 2. No focal airspace disease.     Signed by: Alton Piper 3/10/2024 10:55 AM Dictation workstation:   [id number]    XR chest 1 view    Result Date: 3/9/2024  Interpreted By:  Alton Piper, STUDY: XR CHEST 1 VIEW; 3/9/2024 8:37 am   INDICATION: Signs/Symptoms:daily.   COMPARISON: 03/08/2024.   ACCESSION NUMBER(S): [id number]   ORDERING CLINICIAN: BEULAH  FROILAN   FINDINGS:     CARDIOMEDIASTINAL SILHOUETTE: Patient is status post median sternotomy. Impella device in place. Cardiomegaly versus pericardial effusion.   LUNGS: Lungs are clear of focal airspace disease or edema. No pneumothorax.   ABDOMEN: No remarkable upper abdominal findings.   BONES: No acute osseous changes.       1.  Impella device in place. No focal airspace disease.     Signed by: Alton Piper 3/9/2024 9:32 AM Dictation workstation:   [id number]    XR chest 1 view    Result Date: 3/9/2024  Interpreted By:  Alton Piper, STUDY: XR CHEST 1 VIEW; 3/8/2024 4:09 am   INDICATION: Signs/Symptoms:daily.   COMPARISON: 03/07/2024.   ACCESSION NUMBER(S): [id number]   ORDERING CLINICIAN: BEULAH MCGOVERN   FINDINGS:     CARDIOMEDIASTINAL SILHOUETTE: Patient is status post median sternotomy Impella in place overlying the left ventricle. Right IJ catheter overlies the SVC. CardioMEMS device overlies the left pulmonary artery.   LUNGS: Lungs are clear of focal airspace disease or edema. No pneumothorax.   ABDOMEN: No remarkable upper abdominal findings.   BONES: No acute osseous changes.       1.  Life-support systems in satisfactory position. No focal airspace disease.     Signed by: Alton Piper 3/9/2024 9:31 AM Dictation workstation:   [id number]    Vascular US Ankle Brachial Index (KATHIE) Without Exercise    Result Date: 3/8/2024            [place] [address]   Tel [phone] and Fax [phone]  Vascular Lab Report [facility] US ANKLE BRACHIAL INDEX (KATHIE) WITHOUT EXERCISE  Patient Name:      MIGUEL REGINALD Camara Physician:  [id number] Robert Santiago MD, RPVI Study Date:        3/8/2024              Ordering Physician: [id number] BEULAH MCGOVERN MRN/PID:           [id number]              Technologist:       Kati Wilde                                                               T Accession#:        [id number]          Technologist 2: Date of Birth/Age: 1990 / 34 years   Encounter#:         [id number] Gender:            F Admission Status:  Inpatient             Location Performed: [facility]  Diagnosis/ICD: Peripheral vascular disease, unspecified-I73.9 Indication:    Transplant cardiomyopthy CPT Codes:     60881 Peripheral artery KATHIE Only  Patient History Cardiac assist device.  CONCLUSIONS: Right Lower PVR: No evidence of arterial occlusive disease in the right lower extremity at rest. Normal digital perfusion noted. Triphasic flow is noted in the right common femoral artery, right posterior tibial artery and right dorsalis pedis artery. Left Lower PVR: No evidence of arterial occlusive disease in the left lower extremity at rest. Normal digital perfusion noted. Triphasic flow is noted in the left posterior tibial artery and left dorsalis pedis artery. Right groin image not saved due to archiving error, was triphasic.  Comparison: Compared with study from 2/3/2022, no significant change.  Imaging & Doppler Findings:  RIGHT Lower PVR                Pressures Ratios Right Posterior Tibial (Ankle) 110 mmHg  1.02 Right Dorsalis Pedis (Ankle)   108 mmHg  1.00 Right Digit (Great Toe)        105 mmHg  0.97   LEFT Lower PVR                Pressures Ratios Left Posterior Tibial (Ankle) 98 mmHg   0.91 Left Dorsalis Pedis (Ankle)   97 mmHg   0.90 Left Digit (Great Toe)        87 mmHg   0.81                     Right     Left Brachial Pressure 107 mmHg 108 mmHg   [id number] Santiago MD, RPVI Electronically signed by [id number] Santiago MD, RPVI on 3/8/2024 at 11:44:19 AM  ** Final **     Vascular US carotid artery duplex bilateral    Result Date: 3/7/2024            [place] [address]   Tel [phone] and Fax  [phone]  Vascular Lab Report [facility] US CAROTID ARTERY DUPLEX BILATERAL  Patient Name:      MIGUEL BASS Reading Physician:  [id number] Robert Santiago MD, RPVI Study Date:        3/7/2024              Ordering Physician: [id number] BEULAH MCGOVERN MRN/PID:           [id number]              Technologist:       CT Accession#:        [id number]          Technologist 2: Date of Birth/Age: 1990 / 34 years   Encounter#:         [id number] Gender:            F Admission Status:  Inpatient             Location Performed: [facility]  Diagnosis/ICD: Encounter for other preprocedural examination-Z01.818 CPT Codes:     57150 Cerebrovascular Carotid Duplex scan complete  CONCLUSIONS: Left Carotid: Findings are consistent with less than 50% stenosis of the left proximal internal carotid artery. Left external carotid artery appears patent with no evidence of stenosis. The left vertebral artery is patent with antegrade flow. No evidence of hemodynamically significant stenosis in the left subclavian artery.  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 Line on right side of neck. Unable to visualize right side vessles. Patient on cardiac device , irregular heart rhythm. Patient is on Cardiac device, LVAD? Velocity criteria for carotid stenosis might be equivocal inLVAD patients  Imaging & Doppler Findings: Left Plaque Morph: The proximal left internal carotid artery demonstrates homogenous and irregular plaque. The distal left common carotid artery demonstrates smooth and irregular plaque.  Right                 Left  PSV  EDV              PSV     EDV             CCA P    57 cm/s             CCA D    51 cm/s             ICA P    51 cm/s 27 cm/s             ICA M    60 cm/s 32 cm/s             ICA D    56 cm/s 24 cm/s               ECA     39 cm/s           Vertebral  20 cm/s 16 cm/s           Subclavian 31 cm/s               Left ICA/CCA Ratio 1.0   [id number] ALEXSANDER Silverio MD Electronically signed by [id number] ALEXSANDER Silverio MD on 3/7/2024 at 9:33:19 PM  ** Final **     Vascular US aorta iliac duplex limited    Result Date: 3/7/2024            [place] [address]   Tel [phone] and Fax [phone]  Vascular Lab Report [facility] US AORTA ILIAC DUPLEX LIMITED  Patient Name:      MIGUEL NAILSSIMONE BASS Reading Physician:  [id number] Aburto MD Study Date:        3/7/2024              Ordering Physician: [id number] MCGOVERN MRN/PID:           [id number]              Technologist:       Chio Amaro RVT Accession#:        [id number]          Technologist 2: Date of Birth/Age: 1990 / 34 years   Encounter#:         [id number] Gender:            F Admission Status:  Inpatient             Location Performed: [facility]  Diagnosis/ICD: Encounter for preprocedural cardiovascular examination-Z01.810 CPT Codes:     69781 Duplex Aorta/IVC/Iliac/Bypass Graft  CONCLUSIONS: Aorta/Common Iliac Arteries/IVC: The abdominal aorta and bilateral common iliac arteries demonstrate no evidence of aneurysm. There is smooth homogeneous plaque visualized proximally within the right common iliac artery. There is no evidence of hemodynamically significant stenosis in the right common iliac artery. There is smooth homogeneous plaque within the left common iliac artery. No evidence of hemodynamically significant stenosis in the left common iliac artery.  Additional Findings: Lines, dressings, and colostomy bag on abdomen. Limited access to abdomen. Patient on cardiac device. Irregular heart rhythm.   Imaging & Doppler Findings:   AORTA     AP    Lateral    PSV Proximal 2.09 cm 2.09 cm 58.4 cm/s   Mid    1.96 cm 1.96 cm 63.6 cm/s  Distal  1.61 cm 1.66 cm 58.4 cm/s    RIGHT       AP    Lateral    PSV RUTHIE Proximal 1.11 cm 1.02 cm 48.80 cm/s     LEFT       AP    Lateral    PSV RUTHIE Proximal 1.13 cm 1.21 cm 42.70 cm/s  [id number] ALEXSANDER Silverio MD Electronically signed by [id number] ALEXSANDER Silverio MD on 3/7/2024 at 5:00:01 PM  ** Final **     US abdomen    Result Date: 3/7/2024  Interpreted By:  Ash Ricks and Stephens Katherine STUDY: US ABDOMEN;  3/7/2024 4:09 pm   INDICATION: Signs/Symptoms:heart transplant workup.   COMPARISON: Renal ultrasound 02/19/2024   ACCESSION NUMBER(S): [id number]   ORDERING CLINICIAN: BEULAH MCGOVERN   TECHNIQUE: Multiple images of the abdomen were obtained.   FINDINGS: LIVER: The liver measures 16.1 cm and is grossly unremarkable and free of any focal lesions.   GALLBLADDER: The gallbladder is nondistended, and demonstrates sludge. No wall thickening or surrounding fluid. The gallbladder wall thickness is 0.2 cm. Sonographic Morrell's sign is negative.   BILE DUCTS: No evidence of intra or extrahepatic biliary dilatation is identified; the common bile duct measures 0.5 cm.   PANCREAS: The pancreas is poorly visualized due to overlying bowel gas.   RIGHT KIDNEY: The right kidney measures 10.3 cm in length. The renal cortical echogenicity and thickness are within normal limit.  No hydronephrosis or renal calculi are seen.   LEFT KIDNEY: The left kidney measures 10.2 cm in length. The renal cortical echogenicity and thickness are within normal limits. No hydronephrosis or renal calculi are seen.   SPLEEN: The spleen measures 7.7 cm and is grossly unremarkable.   URINARY BLADDER: The urinary bladder is within normal limits.   PERITONEUM: There is no free or loculated fluid seen in the abdomen.   ABDOMINAL AORTA AND IVC: The visualized portions of the aorta and IVC are  unremarkable.       Biliary sludge without evidence of acute cholecystitis. Otherwise unremarkable abdominal ultrasound.   I personally reviewed the images/study and I agree with Charity Ross DO's (radiology resident) findings as stated. This study was interpreted at [facility], [place], [place].   MACRO: None   Signed by: Ash Ricks 3/7/2024 4:37 PM Dictation workstation:   [id number]    Electrocardiogram, 12-lead PRN ACS symptoms    Result Date: 3/7/2024  Sinus tachycardia Low voltage QRS Possible Anteroseptal infarct Possible Lateral infarct Abnormal ECG Confirmed by Kei Lozano ([id number]) on 3/7/2024 3:01:50 PM    ECG 12 lead    Result Date: 3/7/2024   Poor data quality, interpretation may be adversely affected Sinus tachycardia with occasional Premature ventricular complexes Low voltage QRS Anterior infarct Abnormal ECG Confirmed by Kei Lozano ([id number]) on 3/7/2024 3:01:17 PM    Lower extremity venous duplex bilateral    Result Date: 3/7/2024            [place] [address]   Tel [phone] and Fax [phone]  Vascular Lab Report [facility] US LOWER EXTREMITY VENOUS DUPLEX BILATERAL  Patient Name:     MIGUEL DIMAS      Reading           [id number] Carolina BASS                Physician:        MD Study Date:       3/6/2024             Ordering          [id number] ODILON RAYO                                        Physician:        GINA MRN/PID:          [id number]             Technologist:     Sue Hammond S Accession#:       [id number]         Technologist 2: Date of           1990 / 34 years  Encounter#:       [id number] Birth/Age: Gender:           F Admission Status: Inpatient            Location          [facility]                                        Performed:  Diagnosis/ICD: Generalized edema (anasarca)-R60.1 CPT Codes:     90981 Peripheral venous duplex scan for DVT  complete  Patient History PE. Heart transplant patient and now on list for second heart                 transplant.                 Patient has impella                 Recent ECMO.  CONCLUSIONS: Right Lower Venous: No evidence of acute deep vein thrombus visualized in the right lower extremity. Left Lower Venous: No evidence of acute deep vein thrombus visualized in the left lower extremity. Cannot rule out thrombus in non-visualized common femoral and iliac veins due to dressings. Recent ECMO left groin. Unable to visualize left distal external iliac vein and left CFV.  Additional Findings: Left FV- pop vein continous waveforms maybe suggestive of more proximal occlusion or compression. Technically difficult exam due impella and recent ECMO.  Imaging & Doppler Findings:  Right                 Compressible Thrombus        Flow Distal External Iliac     Yes        None   Spontaneous/Phasic CFV                       Yes        None   Spontaneous/Phasic PFV                       Yes        None FV Proximal               Yes        None   Spontaneous/Phasic FV Mid                    Yes        None FV Distal                 Yes        None Popliteal                 Yes        None   Spontaneous/Phasic Peroneal                  Yes        None PTV                       Yes        None  Left        Compress Thrombus    Flow PFV           Yes      None FV Proximal   Yes      None   Continuous FV Mid        Yes      None FV Distal     Yes      None Popliteal     Yes      None   Continuous Peroneal      Yes      None PTV           Yes      None  [id number] Carolina Benítez MD Electronically signed by [id number] Carolina Benítez MD on 3/7/2024 at 9:33:26 AM  ** Final **     XR chest 1 view    Result Date: 3/7/2024  Interpreted By:  Raman Roa, STUDY: XR CHEST 1 VIEW;  3/7/2024 3:49 am   INDICATION: Signs/Symptoms:daily.   COMPARISON: Chest radiograph dated 3/6/2024   ACCESSION NUMBER(S): [id number]   [facility]   FINDINGS: AP radiograph of the chest   The right internal jugular catheter is in satisfactory position. The Impella device is noted unchanged in position. Sternal sutures are present. The heart is mildly enlarged. Pulmonary aeration is similar previous study.       1. No acute cardiopulmonary pathology       Signed by: Raman Roa 3/7/2024 9:27 AM Dictation workstation:   [id number]    Upper extremity venous duplex bilateral    Result Date: 3/6/2024            [place] [address]   Tel [phone] and Fax [phone]  Vascular Lab Report [facility] UPPER EXTREMITY VENOUS DUPLEX BILATERAL  Patient Name:     MIGUEL DIMAS      Reading           11083 Sholaarmando BASS                Physician:        MD Study Date:       3/6/2024             Ordering          [id number] ODILON RAYO                                        Physician:        GINA MRN/PID:          [id number]             Technologist:     Sue Hammond [facility] Accession#:       [id number]         Technologist 2: Date of           1990 / 34 years  Encounter#:       [id number] Birth/Age: Gender:           F Admission Status: Inpatient            Location          [facility]                                        Performed:  Diagnosis/ICD: Pain in right arm-M79.601; Pain in left arm-M79.602 Indication:    Limb pain. CPT Codes:     40933 Peripheral venous duplex scan for DVT complete  Patient History CAD and PE. Patient had heart transplant few years ago now heart                 is not working back on list foe second heart transplant.                 Recent ECMO left groin.  **CRITICAL RESULT** Critical Result: Acute DVT L IJ vein and SVT left cephalic vein and right cephalic vein Notification called to Carlee Marina on 3/6/2024 at 3:30:53 PM by Sue Hammond.  CONCLUSIONS: Right Upper Venous: There is acute occlusive superficial venous thrombosis visualized in the  mid cephalic and acute occlusive superficial venous thrombosis visualized in the distal cephalic veins. Axillary vein compression images were not saved to review. The remainder of veins in right arm are negative for deep vein thrombus. Cannot rule out thrombus of non-compressible proximal subclavian vein due to Impella. Cannot rule out thrombus of non-visualized internal jugular, distal subclavian and mid subclavian veins due to line and Impella. Left Upper Venous: There is acute non-occlusive deep vein thrombosis visualized in the internal jugular and acute non-occlusive superficial venous thrombosis visualized in the mid cephalic veins. The remainder of veins in left arm are negative for deep vein thrombus.  Additional Findings: Technically difficut exam due to impella and lines. Somehow continuos flow is noted which maybe suggestive of more proximal occlusion or compression.  Imaging & Doppler Findings:  Right               Compressible    Thrombus            Flow Subclavian                            None       Spontaneous/Phasic Subclavian Proximal                   None       Spontaneous/Phasic Subclavian Distal                     None Axillary                Yes           None       Spontaneous/Phasic Brachial                Yes           None Cephalic                 No      Acute occlusive Basilic                 Yes           None  Left                Compress      Thrombus              Flow Internal Jugular    Partial  Acute non-occlusive Spontaneous/Phasic Subclavian            Yes           None         Spontaneous/Phasic Subclavian Proximal   Yes           None         Spontaneous/Phasic Subclavian Mid        Yes           None         Spontaneous/Phasic Subclavian Distal     Yes           None         Spontaneous/Phasic Axillary              Yes           None         Spontaneous/Phasic Brachial              Yes           None Cephalic            Partial  Acute non-occlusive     Continuous Basilic                Yes           None  [id number] Carolina Benítez MD Electronically signed by [id number] Carolina Benítez MD on 3/6/2024 at 4:20:11 PM  ** Final **     XR chest 1 view    Result Date: 3/6/2024  Interpreted By:  Raman Roa, STUDY: XR CHEST 1 VIEW;  3/6/2024 3:40 am   INDICATION: Signs/Symptoms:daily.   COMPARISON: Chest radiograph dated 3/5/2024   ACCESSION NUMBER(S): [id number]   ORDERING CLINICIAN: BEULAH MCGOVERN   FINDINGS: AP radiograph of the chest   Limitations: Decreased lung volumes. Patient rotation to the left.   Right internal jugular central venous catheter with distal tip overlying the expected location of the mid superior vena cava. Impella device overlying the left ventricle. Postsurgical changes from median sternotomy. Surgical clips project over the right axilla/upper chest.   Coarse interstitial lung markings are noted bilaterally. Ground-glass opacity within the retrocardiac left lower lobe is noted. The heart is mildly enlarged.       1. Coarse interstitial lung markings bilaterally 2. Small left pleural effusion and subsegmental atelectasis       Signed by: Raman Roa 3/6/2024 8:55 AM Dictation workstation:   [id number]    XR chest 1 view    Result Date: 3/5/2024  Interpreted By:  Raman Roa and Ogievich Taessa STUDY: XR CHEST 1 VIEW;  3/5/2024 11:28 am   INDICATION: Signs/Symptoms:Dialysis line.   COMPARISON: Chest x-ray 03/05/2021-03/01/2024 CT 02/02/2022   ACCESSION NUMBER(S): [id number]   ORDERING CLINICIAN: BEULAH MCGOVERN   FINDINGS: AP semi-erect radiograph of the chest   LINES/TUBES/DEVICES: Interval placement of right internal jugular central venous catheter with distal tip overlying the expected location of the mid superior vena cava. Impella device overlying the left ventricle. Postsurgical changes from median sternotomy. Surgical clips project over the right axilla/upper chest.   CARDIOMEDIASTINAL SILHOUETTE: The cardiomediastinal silhouette is stable in size and  configuration.   LUNGS: No acute pulmonary infiltrates or consolidations are noted. The left ventricle obscures the retrocardiac left lower lobe. Vascular clips overlie the right upper thorax and axilla.   ABDOMEN: No remarkable upper abdominal findings.   BONES: No acute osseous abnormality.       1. No acute cardiopulmonary pathology similar to previous study 2. Medical devices as detailed above   I personally reviewed the images/study and I agree with the findings as stated by Heather Lopez DO, PGY-2. This study was interpreted at [place], [place].   Signed by: Raman Roa 3/5/2024 1:46 PM Dictation workstation:   [id number]    XR chest 1 view    Result Date: 3/5/2024  Interpreted By:  Raman Roa and Ogievich Taessa STUDY: XR CHEST 1 VIEW;  3/5/2024 8:30 am   INDICATION: Signs/Symptoms:morning cxr. Per EMR: Day 19 of admission presenting with cardiogenic shock.   COMPARISON: Chest x-ray 03/04/2024-02/29/2024 CT chest 02/02/2022   ACCESSION NUMBER(S): [id number]   ORDERING CLINICIAN: BEULAH MCGOVERN   FINDINGS: AP semi-erect radiograph of the chest   LINES/TUBES/DEVICES: Postsurgical changes from median sternotomy. Surgical clips project over the right axilla. Impella device overlying the left ventricle. Right IJ introducer is noted with distal tip in the superior vena cava.   CARDIOMEDIASTINAL SILHOUETTE: The cardiomediastinal silhouette is stable in size and configuration.   LUNGS: Improvement in lung volume and aeration bilaterally. Persistent but improved interstitial opacities. Bibasilar subsegmental atelectasis. Blunting of the left costophrenic angle may be due to enlarged cardiac silhouette and/or pleural effusion. No evidence of pneumothorax.   ABDOMEN: No remarkable upper abdominal findings.   BONES: No acute osseous abnormality.       1. Improved lung volumes, aeration, and interstitial opacities bilaterally. 2. Persistent bibasilar  atelectasis. 3. Possible small left pleural effusion.   I personally reviewed the images/study and I agree with the findings as stated by Heather Lopez DO, PGY-2. This study was interpreted at [place], [place].   Signed by: Raman Roa 3/5/2024 10:20 AM Dictation workstation:   [id number]    Transthoracic Echo (TTE) Limited    Result Date: 3/4/2024   [facility], [address] [place] [address]                Tel [phone] and Fax [phone] TRANSTHORACIC ECHOCARDIOGRAM REPORT  Patient Name:      MIGUEL DIMAS      Reading Physician:    [id number] Param BASS MD Study Date:        3/4/2024             Ordering Provider:    [id number] ELENA QUINTERO MRN/PID:           [id number]             Fellow: Accession#:        [id number]         Nurse: Date of Birth/Age: 1990 / 33 years  Sonographer:          Adryan Meredith RDCS Gender:            F                    Additional Staff: Height:            154.94 cm            Admit Date:           2/15/2024 Weight:            90.27 kg             Admission Status:     Inpatient -                                                               Routine BSA / BMI:         1.89 m2 / 37.60      Encounter#:           [id number]                    kg/m2                                         Department Location:  [facility] Blood Pressure: 127 /84 mmHg Study Type:    TRANSTHORACIC ECHO (TTE) LIMITED Diagnosis/ICD: Cardiogenic shock-R57.0 Indication:    shock CPT Code:      Echo Limited-26576; Doppler Limited-99428; Color Doppler-00460 Patient History: Pertinent History: 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 Study Detail: The following Echo studies were performed:  2D, M-Mode, Doppler and               color flow. Technically challenging study due to body habitus,               patient lying in supine position, poor acoustic windows, prominent               lung artifact and postoperative dressings. Definity used as a               contrast agent for endocardial border definition. Total contrast               used for this procedure was 2.0 mL via IV push.  PHYSICIAN INTERPRETATION: Left Ventricle: The left ventricular systolic function is moderately to severely decreased, with an estimated ejection fraction of 30-35%. There is global hypokinesis of the left ventricle with minor regional variations. The left ventricular cavity size is normal. Left ventricular diastolic filling was not assessed. Left Atrium: The left atrium is consistent with transplant. Right Ventricle: The right ventricle is mildly enlarged. There is reduced right ventricular systolic function. A device is visualized in the right ventricle. Right Atrium: The right atrium is consistent with a transplant. Aortic Valve: The aortic valve was not well visualized. There is no evidence of aortic valve regurgitation. The peak instantaneous gradient of the aortic valve is 12.2 mmHg. Mitral Valve: The mitral valve is normal in structure. There is mild mitral valve regurgitation. Tricuspid Valve: The tricuspid valve is structurally normal. There is mild tricuspid regurgitation. The Doppler estimated RVSP is mildly elevated at 35.9 mmHg. Pulmonic Valve: The pulmonic valve was not assessed. Pulmonic valve regurgitation was not assessed. Pericardium: There is a trivial to small pericardial effusion. Aorta: The aortic root is normal.  CONCLUSIONS:  1. Left ventricular systolic function is moderately to severely decreased with a 30-35% estimated ejection fraction.  2. There is reduced right ventricular systolic function.  3. Mildly elevated RVSP.  4. There is global hypokinesis of the left ventricle with minor regional  variations. QUANTITATIVE DATA SUMMARY: 2D MEASUREMENTS:                           Normal Ranges: IVSd:          1.10 cm    (0.6-1.1cm) LVPWd:         1.20 cm    (0.6-1.1cm) LVIDd:         4.60 cm    (3.9-5.9cm) LVIDs:         3.90 cm LV Mass Index: 102.3 g/m2 LV % FS        15.2 % LV SYSTOLIC FUNCTION BY 2D PLANIMETRY (MOD):                     Normal Ranges: EF-A4C View: 25.7 % (>=55%) EF-A2C View: 38.1 % EF-Biplane:  33.6 % AORTIC VALVE:                        Normal Ranges: AoV Vmax:    1.75 m/s  (<=1.7m/s) AoV Peak P.2 mmHg (<20mmHg)  RIGHT VENTRICLE: RV s' 0.08 m/s TRICUSPID VALVE/RVSP:                             Normal Ranges: Peak TR Velocity: 2.87 m/s RV Syst Pressure: 35.9 mmHg (< 30mmHg) IVC Diam:         2.00 cm  [id number] Param Doan MD Electronically signed on 3/4/2024 at 1:38:45 PM  ** Final **     XR chest 1 view    Result Date: 3/4/2024  Interpreted By:  Raman Roa, STUDY: XR CHEST 1 VIEW;  3/4/2024 3:32 am   INDICATION: Signs/Symptoms:CTICU morning CXR.   COMPARISON: Chest radiograph dated 3/3/2024   ACCESSION NUMBER(S): [id number]   ORDERING CLINICIAN: ENRRIQUE CRISTINA   FINDINGS: AP radiograph of the chest   Decreased lung volumes are noted. The patient is rotated to the left. Sternal sutures are noted. An Impella device is in adequate position. The heart is mildly enlarged. Interstitial opacities are demonstrated bilaterally subsegmental atelectasis is suggested within the retrocardiac left lower lobe.       1. Mild interstitial opacities bilaterally accentuated by decreased lung volumes. The pulmonary vascular distribution is similar previous study. 2. Possible subsegmental atelectasis and left pleural effusion obscured by the left ventricle.       Signed by: Raman Roa 3/4/2024 8:49 AM Dictation workstation:   [id number]    XR chest 1 view    Result Date: 3/3/2024  Interpreted By:  Shayne Waller, STUDY: XR CHEST 1 VIEW;  3/3/2024 3:44 am   INDICATION: Signs/Symptoms:cardiogenic shock on  MCS.   COMPARISON: 03/02/2024.   ACCESSION NUMBER(S): [id number]   ORDERING CLINICIAN: KIM MEHTA       1.  Removal of the ETT, left internal jugular catheter, right internal jugular  [place]-Estevan catheter. Stable position of the Impella device. 2. Cardiac silhouette is mildly enlarged. 3. Pulmonary vessels are congested with mild pulmonary edema/fluid overload., slightly improved aeration since last exam. 4. Small left-sided pleural effusion. 5. No pneumothorax seen.       MACRO: None   Signed by: Shayne Waller 3/3/2024 12:54 PM Dictation workstation:   [id number]    XR abdomen 1 view    Result Date: 3/2/2024  Interpreted By:  Shayne Waller and Liller Gregory STUDY: XR ABDOMEN 1 VIEW;  3/2/2024 7:42 am   INDICATION: Signs/Symptoms:OG.   COMPARISON: 02/24/2024   ACCESSION NUMBER(S): [id number]   ORDERING CLINICIAN: KIM MEHTA   FINDINGS: Enteric tube projects expected location of the gastric body.   Nonobstructive bowel gas pattern. No evidence to suggest pneumoperitoneum.   Visualized lungs are clear.   No acute osseous injury.       Nonobstructive bowel gas pattern. Enteric tube projects over the expected location of the gastric body.   I personally reviewed the images/study and I agree with the findings as stated above by resident physician, Dr. Sloan Ybarra. The study was interpreted at [facility] in [facility].   MACRO: none   Signed by: Shayne Waller 3/2/2024 11:48 AM Dictation workstation:   [id number]    XR chest 1 view    Result Date: 3/2/2024  Interpreted By:  Shayne Waller, STUDY: XR CHEST 1 VIEW;  3/2/2024 3:18 am   INDICATION: Signs/Symptoms:cardiogenic shock on MCS.   COMPARISON: 03/01/2024.   ACCESSION NUMBER(S): [id number]   ORDERING CLINICIAN: LYN SELBY       1.  ETT 1.9 cm above remedios. Stable position of the Impella device. [place]-Estevan catheter with the tip overlies the pulmonic trunk. 2. Stable postoperative changes. 3. Low lung  volumes with bronchovascular crowding. 4. Cardiac silhouette is mildly enlarged. 5. Pulmonary vessels are congested with mild pulmonary edema. 6. Small left-sided pleural effusion. 7. No pneumothorax seen.       MACRO: None   Signed by: Shayne Waller 3/2/2024 11:48 AM Dictation workstation:   [id number]    XR chest 1 view    Result Date: 3/2/2024  Interpreted By:  Shayne Waller and Liller Gregory STUDY: XR CHEST 1 VIEW;  3/1/2024 11:14 pm   INDICATION: Signs/Symptoms:s/p impella.   COMPARISON: Same day radiograph of the chest 3:25 a.m.   ACCESSION NUMBER(S): [id number]   ORDERING CLINICIAN: ENRRIQUE CRISTINA   FINDINGS: AP radiograph of the chest was provided.   Left internal jugular central venous catheter tip projects over the expected location of the left brachiocephalic vein. Postsurgical changes consistent with median sternotomy are seen. Endotracheal tube terminates 3.2 cm from the remedios. Impella device overlies the expected location of the left ventricle. Pulmonary artery pressure monitoring device is in place. Surgical clips overlie the right axillary region.   CARDIOMEDIASTINAL SILHOUETTE: Cardiomediastinal silhouette is stable in size and configuration.   LUNGS: Low lung volumes contributing to bronchovascular crowding. May be trace left effusion. No pneumothorax.   ABDOMEN: No remarkable upper abdominal findings.   BONES: No osseous injury.       1. Trace left effusion with associated adjacent atelectasis/consolidation superimposed on bronchovascular crowding. 2. Medical devices described above.   I personally reviewed the images/study and I agree with the findings as stated above by resident physician, Dr. Sloan Ybarra. The study was interpreted at [facility] in [facility].   MACRO: none.   Signed by: Shayne Waller 3/2/2024 8:52 AM Dictation workstation:   [id number]    FL fluoro images no charge    Result Date: 3/1/2024  These images are not reportable by  radiology and will not be interpreted by  Radiologists.    Anesthesia Intraoperative Transesophageal Echocardiogram    Result Date: 3/1/2024  [facility] Dept of Anesthesiology, [address] [place] [address]                     Tel [phone] and Fax [phone] TRANSESOPHAGEAL ECHOCARDIOGRAM REPORT  Patient Name:     MIGUEL DIMAS      Reading          [id number] Harpreet BASS                Physician:       MD Study Date:       3/1/2024             Ordering         [id number] HARPREET ZUNIGA                                        Provider:        ARTHUR MRN/PID:          [id number]             Fellow: Accession#:       [id number]         Nurse: Date of           1990 / 33 years  Sonographer: Birth/Age: Gender:           F                    Additional                                        Staff: BSA / BMI:        m2 / kg/m2           Encounter#:      [id number] Study Type:    ANESTHESIA INTRAOPERATIVE BOB Diagnosis/ICD: Left ventricular failure-I50.1 PHYSICIAN INTERPRETATION: Left Ventricle: The left ventricular systolic function is moderately to severely decreased, with an estimated ejection fraction of 30%. The left ventricular cavity size is normal. Left ventricular diastolic filling was not assessed. Left Atrium: The left atrium is enlarged. There is no evidence of a patent foramen ovale. The left atrial appendage is enlarged and there is no thrombus visualized in the left atrial appendage. Right Ventricle: The right ventricle is mildly enlarged. There is mildly reduced right ventricular systolic function. Right Atrium: The right atrium enlarged. Aortic Valve: The aortic valve is trileaflet. There is no evidence of aortic valve stenosis. There is no evidence of aortic valve regurgitation. Mitral Valve: The mitral valve is normal in structure. There is no evidence of mitral valve stenosis. There is moderate mitral valve regurgitation which is centrally directed. Tricuspid  Valve: The tricuspid valve is structurally normal. There is mild tricuspid regurgitation. Pulmonic Valve: The pulmonic valve is structurally normal. There is mild pulmonic valve regurgitation. Pericardium: There is a small pericardial effusion. Aorta: The aortic root is normal. There is no evidence of aortic dissection. The ascending Ao diameter is 3.0 cm. Additional Comments: Study performed on inotropic support. Surgeon aware of all findings. In comparison to the previous echocardiogram(s): Not performed on intraoperative study.  CONCLUSIONS:  1. Left ventricular systolic function is moderately to severely decreased with a 30% estimated ejection fraction.  2. There is mildly reduced right ventricular systolic function.  3. The left atrium is enlarged.  4. Moderate mitral valve regurgitation.  5. Aortic valve stenosis is not present. POST CARDIOPULMONARY BYPASS REPORT: Successful placement of Impella 5.5 device into LV. Surgeon aware of all findings.  QUANTITATIVE DATA SUMMARY: MITRAL INSUFFICIENCY:                           Normal Ranges: PISA Radius:  0.7 cm MR Alias Claude: 40.4 cm/s MR Flow Rt:   127.96 ml/s  [id number] Arian Ha MD Electronically signed on 3/1/2024 at 8:38:48 PM  ** Final **     Point of Care Ultrasound    Result Date: 3/1/2024  Demetrio Murrell MD     3/1/2024  6:34 PM Point-of-care ultrasound ordered for patient's history of heart failure with rising tachycardia rising creatinine.  Apical four-chamber parasternal long axis and parasternal short axis transthoracic views were obtained.  LV function moderately reduced with EF 30 to 40%.  Right ventricle mildly enlarged but shows okay systolic function.  Trivial pericardial effusion.  IVC with minimal respiratory variation and 2 cm.  Hepatic vein Doppler ultrasound with SD reversal, portal waveform with undulation both consistent with venous congestion. Point-of-care cardiac echo shows cardiac activity is present, trivial pericardial effusion  and reduced ejection fraction. Venous access ultrasound shows evidence of moderate to severe venous congestion    Transthoracic Echo (TTE) Limited    Result Date: 3/1/2024   [facility], [address] [place] [address]                Tel [phone] and Fax [phone] TRANSTHORACIC ECHOCARDIOGRAM REPORT  Patient Name:      MIGUEL DIMAS      Reading Physician:    [id number] Arian BASS MD Study Date:        3/1/2024             Ordering Provider:    [id number] ELENA QUINTERO MRN/PID:           [id number]             Fellow: Accession#:        [id number]         Nurse: Date of Birth/Age: 1990 / 33 years  Sonographer:          Adryan Meredith RDCS Gender:            F                    Additional Staff: Height:            154.94 cm            Admit Date:           2/15/2024 Weight:            91.17 kg             Admission Status:     Inpatient - STAT BSA / BMI:         1.89 m2 / 37.98      Encounter#:           [id number]                    kg/m2                                         Department Location:  [facility] Blood Pressure: 111 /62 mmHg Study Type:    TRANSTHORACIC ECHO (TTE) LIMITED Diagnosis/ICD: Cardiogenic shock-R57.0; Heart transplant status-Z94.1 Indication:    shock, s/p heart tx CPT Code:      Echo Limited-34451 Patient History: Pertinent History: ECMO decannulation 2.29.24, 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 Study Detail: The following Echo studies were performed: 2D. Technically               challenging study due to patient lying in supine position. The               patient is on a balloon pump.  PHYSICIAN INTERPRETATION: Left Ventricle: The left ventricular systolic function is moderately decreased, with an estimated ejection fraction of  30-35%. There is global hypokinesis of the left ventricle with minor regional variations. The left ventricular cavity size is normal. The left ventricular septal wall thickness is mildly increased. There is mild concentric left ventricular hypertrophy. Left ventricular diastolic filling was not assessed. Left Atrium: The left atrium is suggestive of transplant. Right Ventricle: The right ventricle is mildly enlarged. There is reduced right ventricular systolic function. A device is visualized in the right ventricle. Right Atrium: The right atrium is suggestive of a transplant. Aortic Valve: The aortic valve is trileaflet. Aortic valve regurgitation was not assessed. Mitral Valve: The mitral valve is normal in structure. Mitral valve regurgitation was not assessed. Tricuspid Valve: The tricuspid valve is structurally normal. Tricuspid regurgitation was not assessed. Pulmonic Valve: The pulmonic valve is not well visualized. Pulmonic valve regurgitation was not assessed. Pericardium: There is a trivial pericardial effusion. Aorta: The aortic root is normal. There is an IABP noted in the descending aorta. Systemic Veins: The inferior vena cava appears mildly dilated. There is less than 50% IVC collapse with inspiration. In comparison to the previous echocardiogram(s): Compared with study from 2/29/2024, LVEF decreased from 44% to 30-35% on current study.  CONCLUSIONS:  1. Left ventricular systolic function is moderately decreased with a 30-35% estimated ejection fraction.  2. There is reduced right ventricular systolic function.  3. There is an IABP noted in the descending aorta.  4. There is global hypokinesis of the left ventricle with minor regional variations. QUANTITATIVE DATA SUMMARY: 2D MEASUREMENTS:                           Normal Ranges: IVSd:          1.20 cm    (0.6-1.1cm) LVPWd:         1.10 cm    (0.6-1.1cm) LVIDd:         4.60 cm    (3.9-5.9cm) LVIDs:         3.80 cm LV Mass Index: 101.9 g/m2 LV % FS         17.4 % TRICUSPID VALVE/RVSP:                   Normal Ranges: IVC Diam: 2.30 cm  [id number] Arian Abbasi MD Electronically signed on 3/1/2024 at 5:26:05 PM  ** Final **     XR chest 1 view    Result Date: 3/1/2024  Interpreted By:  Raman Roa, STUDY: XR CHEST 1 VIEW;  3/1/2024 4:11 am   INDICATION: Signs/Symptoms:cardiogenic shock on MCS.   COMPARISON: Chest radiograph dated 2/29/2024   ACCESSION NUMBER(S): [id number]   ORDERING CLINICIAN: LYN SELBY   FINDINGS: AP radiograph of the chest   The swans Estevan catheter is positioned within the region of the left main pulmonary artery. The left internal jugular catheter is positioned within the superior vena cava near the origin of the brachiocephalic vein. The balloon pump radiopaque marker is positioned at the level of the aortic arch.. The ECMO device is been removed.   The heart is maximum normal in size. The retrocardiac left lower lobe is obscured by the left ventricle. Otherwise the lungs are well aerated.       1. No acute cardiopulmonary pathology 2. Likely subsegmental atelectasis within the retrocardiac left lower lobe       Signed by: Raman Roa 3/1/2024 9:49 AM Dictation workstation:   [id number]    Transthoracic Echo (TTE) Limited    Result Date: 2/29/2024   [facility], [address] [place] [address]                Tel [phone] and Fax [phone] TRANSTHORACIC ECHOCARDIOGRAM REPORT  Patient Name:      MIGUEL DIMAS      Reading Physician:    [id number] Francis Rodríguez MD Study Date:        2/29/2024            Ordering Provider:    [id number] LYN SELBY MRN/PID:           [id number]             Fellow: Accession#:        [id number]         Nurse: Date of Birth/Age: 1990 / 33 years  Sonographer:          Adryan Meredith RDCS Gender:             F                    Additional Staff: Height:            152.40 cm            Admit Date:           2/15/2024 Weight:            91.17 kg             Admission Status:     Inpatient -                                                               [facility]                                                               threatening BSA / BMI:         1.87 m2 / 39.26      Encounter#:           [id number]                    kg/m2                                         Department Location:  [facility] Blood Pressure: 103 /48 mmHg Study Type:    TRANSTHORACIC ECHO (TTE) LIMITED Diagnosis/ICD: Cardiogenic shock-R57.0 Indication:    s/p ECMo decunnulation to ensure pt able to be maintained off                ECMO CPT Code:      Echo Limited-42813; Doppler Limited-56457; Color Doppler-70640 Patient History: Pertinent History: ECMO decannulation , 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 Study Detail: The following Echo studies were performed: 2D, M-Mode, Doppler and               color flow. Technically challenging study due to patient lying in               supine position. Definity used as a contrast agent for endocardial               border definition. Total contrast used for this procedure was 2.0               mL via IV push.  PHYSICIAN INTERPRETATION: Left Ventricle: The left ventricular systolic function is mildly decreased, with an estimated ejection fraction of 44%. There is global hypokinesis of the left ventricle with minor regional variations. The left ventricular cavity size is normal. The left ventricular septal wall thickness is mildly increased. There is mildly increased left ventricular posterior wall thickness. Left ventricular diastolic filling was not assessed. Patient persisted with tachycardia at 136 bpm during the study. Left Atrium: The left atrium is consistent with transplant. Right Ventricle: The right ventricle is normal in size. There is reduced right ventricular systolic  function. A device is visualized in the right ventricle. Right Atrium: The right atrium is normal in size. There is a device visualized in the right atrium. Aortic Valve: The aortic valve is trileaflet. There is trace to mild aortic valve regurgitation. Mitral Valve: The mitral valve is normal in structure. There is mild to moderate mitral valve regurgitation. Tricuspid Valve: The tricuspid valve is abnormal. There is mild to moderate tricuspid regurgitation. The Doppler estimated RVSP is mildly elevated at 35.3 mmHg. Pulmonic Valve: The pulmonic valve was not assessed. Pulmonic valve regurgitation was not assessed. Pericardium: There is a trivial to small pericardial effusion. Aorta: The aortic root was not assessed. In comparison to the previous echocardiogram(s): Compared with study from 2/28/2024, there is a mild increase in LVEF from 30% to 44%. There is still RV systolic dysfunction.  CONCLUSIONS:  1. Left ventricular systolic function is mildly decreased with a 44% estimated ejection fraction.  2. There is global hypokinesis of the left ventricle with minor regional variations.  3. There is reduced right ventricular systolic function.  4. Mild to moderate mitral valve regurgitation.  5. Mild to moderate tricuspid regurgitation visualized.  6. Mildly elevated RVSP.  7. Patient persisted with tachycardia at 136 bpm during the study.  8. Compared with study from 2/28/2024, there is a mild increase in LVEF from 30% to 44%. There is still RV systolic dysfunction. QUANTITATIVE DATA SUMMARY: 2D MEASUREMENTS:                          Normal Ranges: IVSd:          1.10 cm   (0.6-1.1cm) LVPWd:         1.10 cm   (0.6-1.1cm) LVIDd:         4.40 cm   (3.9-5.9cm) LVIDs:         3.60 cm LV Mass Index: 90.3 g/m2 LV % FS        18.2 % LV SYSTOLIC FUNCTION BY 2D PLANIMETRY (MOD):                     Normal Ranges: EF-A4C View: 43.8 % (>=55%) EF-A2C View: 43.6 % EF-Biplane:  52.1 % MITRAL INSUFFICIENCY:                            Normal Ranges: PISA Radius:  0.3 cm MR VTI:       84.90 cm MR Vmax:      424.00 cm/s MR Alias Claude: 38.5 cm/s MR Volume:    4.36 ml MR Flow Rt:   21.77 ml/s MR EROA:      0.05 cm2  RIGHT VENTRICLE: RV s' 0.09 m/s TRICUSPID VALVE/RVSP:                             Normal Ranges: Peak TR Velocity: 2.84 m/s Est. RA Pressure: 3 mmHg RV Syst Pressure: 35.3 mmHg (< 30mmHg)  [id number] Francis Rodríguez MD Electronically signed on 2/29/2024 at 4:29:31 PM  ** Final **     XR chest 1 view    Result Date: 2/29/2024  Interpreted By:  Raman Roa, STUDY: XR CHEST 1 VIEW;  2/29/2024 3:48 am   INDICATION: Signs/Symptoms:icu.   COMPARISON: Chest radiograph dated 2/28/2024   ACCESSION NUMBER(S): [id number]   ORDERING CLINICIAN: SHELLI ESPITIA   FINDINGS: AP radiograph of the chest     The right internal jugular catheter and left internal jugular catheter appear in satisfactory position. Sternal sutures present. The balloon pump radiopaque marker is less than 2 cm below the level of the aortic arch. The ECMO device is in satisfactory position.   The heart is normal in size. No acute pulmonary infiltrates are seen. The left ventricle obscures the retrocardiac left lower lobe.       1. No acute cardiopulmonary pathology       Signed by: Raman Roa 2/29/2024 12:46 PM Dictation workstation:   [id number]    XR chest 1 view    Result Date: 2/29/2024  Interpreted By:  Raman Roa, STUDY: XR CHEST 1 VIEW;  2/28/2024 4:17 am   INDICATION: Signs/Symptoms:icu.   COMPARISON: Chest radiograph dated 02/27/2024   ACCESSION NUMBER(S): [id number]   ORDERING CLINICIAN: SHELLI ESPITIA   FINDINGS: AP radiograph of the chest     The right internal jugular swans Estevan catheter is positioned within the right pulmonary artery. The left internal jugular catheter is positioned within the superior vena cava near the origin of the brachiocephalic vein. Sternal sutures are intact. The balloon pump radiopaque marker is in a similar position to previous study. The  ECMO device is noted.   The heart is normal in size pulmonary aeration is similar previous study.       1. Status quo       Signed by: Raman Roa 2/29/2024 8:40 AM Dictation workstation:   [id number]    Transthoracic Echo (TTE) Limited    Result Date: 2/28/2024   [facility], [address] [place] [address]                Tel [phone] and Fax [phone] TRANSTHORACIC ECHOCARDIOGRAM REPORT  Patient Name:      YAIRRADHAKHURRAM DIMAS      Reading Physician:    [id number] Kei BASS MD Study Date:        2/28/2024            Ordering Provider:    [id number] LYN SELBY MRN/PID:           [id number]             Fellow:               [id number] Mg Malik MD Accession#:        [id number]         Nurse: Date of Birth/Age: 1990 / 33 years  Sonographer:          Bernadette Madsen RDCS Gender:            F                    Additional Staff: Height:            152.40 cm            Admit Date:           2/15/2024 Weight:            91.17 kg             Admission Status:     Inpatient - STAT BSA / BMI:         1.87 m2 / 39.26      Encounter#:           [id number]                    kg/m2                                         Department Location:  [facility] Blood Pressure: 103 /61 mmHg Study Type:    TRANSTHORACIC ECHO (TTE) LIMITED Diagnosis/ICD: Cardiogenic shock-R57.0 Indication:    ECMO turn down CPT Code:      Echo Limited-84691 Patient History: Pertinent History: OHT (3/2022), [place] ECMO. Study Detail: The following Echo studies were performed: 2D. Definity used as a               contrast agent for endocardial border definition. Total contrast               used for this procedure was 2.0 mL via IV push.  PHYSICIAN INTERPRETATION: Left Ventricle: The  left ventricular systolic function is moderately to severely decreased, with an estimated ejection fraction of 30%. There is global hypokinesis of the left ventricle with minor regional variations. The left ventricular cavity size is normal. There is concentric left ventricular hypertrophy. Left ventricular diastolic filling was not assessed. Left Atrium: The left atrium is consistent with transplant. Right Ventricle: The right ventricle is normal in size. There is reduced right ventricular systolic function. Right Atrium: The right atrium is normal in size. Aortic Valve: The aortic valve is probably trileaflet. Aortic valve regurgitation was not assessed. Mitral Valve: The mitral valve is normal in structure. Mitral valve regurgitation was not assessed. Tricuspid Valve: The tricuspid valve is structurally normal. Tricuspid regurgitation was not assessed. Pulmonic Valve: The pulmonic valve is not well visualized. Pulmonic valve regurgitation was not assessed. Pericardium: There is a trivial to small pericardial effusion. Aorta: The aortic root is normal. In comparison to the previous echocardiogram(s): Compared with study from 2/27/2024, no significant change.  CONCLUSIONS:  1. Left ventricular systolic function is moderately to severely decreased with a 30% estimated ejection fraction.  2. There is global hypokinesis of the left ventricle with minor regional variations.  3. There is reduced right ventricular systolic function.  4. EF appears to have improved on turn down. QUANTITATIVE DATA SUMMARY: LA VOLUME:                              Normal Ranges: LA Vol A4C:        65.3 ml   (22+/-6mL/m2) LA Vol Index A4C:  34.9ml/m2 LA Area A4C:       22.0 cm2 LA Major Axis A4C: 6.3 cm  [id number] Kei Lozano MD Electronically signed on 2/28/2024 at 9:26:56 AM  ** Final **     Vascular US lower extremity arterial duplex left    Result Date: 2/27/2024            [place] [address]    Tel [phone] and Fax [phone]  Vascular Lab Report [facility] US LOWER EXTREMITY ARTERIAL DUPLEX LEFT  Patient Name:     MIGUEL Camara           [id number] Rhianna BASS                Physician: Study Date:       2/23/2024            Ordering          [id number] ELENA QUINTERO                                        Physician: MRN/PID:          [id number]             Technologist:     Diandra Carcamo RVT,                                                          [facility] Accession#:       [id number]         Technologist 2: Date of           1990 / 33 years  Encounter#:       [id number] Birth/Age: Gender:           F Admission Status: Inpatient            Location          [facility]                                        Performed:  Diagnosis/ICD: Peripheral vascular disease, unspecified-I73.9 CPT Codes:     42194 Peripheral artery Lower arterial Duplex limited  CONCLUSIONS: Left Lower Arterial: Limited exam due to ECMO line. The distal superficial femoral, popliteal, peroneal, posterior tibial, anterior tibial, and dorsalis pedis arteries are patent with low monophasic flow.  Imaging & Doppler Findings:  Right                    Left  PSV                      PSV          SFA Distal     20 cm/s           Popliteal     13 cm/s          ANGEL Distal     9 cm/s       Peroneal Proximal 16 cm/s          PTA Distal     8 cm/s  [id number] Rhianna Dumont MD Electronically signed by [id number] Rhianna Dumont MD on 2/27/2024 at 2:43:35 PM  ** Final **     Transthoracic Echo (TTE) Limited    Result Date: 2/27/2024   [facility], [address] [place] [address]                Tel [phone] and Fax [phone] TRANSTHORACIC ECHOCARDIOGRAM REPORT  Patient Name:      MIGUEL Camara Physician:    [id number] Kei BASS MD Study Date:        2/27/2024            Ordering Provider:    [id number] JILL ALCANTARA                                                                SELENA MRN/PID:           [id number]             Fellow: Accession#:        [id number]         Nurse: Date of Birth/Age: 1990 / 33 years  Sonographer:          Bernadette Madsen RDCS Gender:            F                    Additional Staff: Height:            152.40 cm            Admit Date:           2/15/2024 Weight:            92.53 kg             Admission Status:     Inpatient - STAT BSA / BMI:         1.88 m2 / 39.84      Encounter#:           [id number]                    kg/m2                                         Department Location:  [facility] Blood Pressure: 124 /64 mmHg Study Type:    TRANSTHORACIC ECHO (TTE) LIMITED Diagnosis/ICD: Cardiogenic shock-R57.0 Indication:    ECMO turn down trial CPT Code:      Echo Limited-55093 Patient History: Pertinent History: IABP, VA-ECMO, 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 Study Detail: The following Echo studies were performed: 2D.  PHYSICIAN INTERPRETATION: Left Ventricle: The left ventricular systolic function is moderately to severely decreased, with an estimated ejection fraction of 30%. There is global hypokinesis of the left ventricle with minor regional variations. The left ventricular cavity size is normal. There is moderate concentric left ventricular hypertrophy. Abnormal (paradoxical) septal motion consistent with post-operative status. Left ventricular diastolic filling was not assessed. Left Atrium: The left atrium is enlarged. Right Ventricle: The right ventricle is normal in size. There is moderately reduced right ventricular systolic function. Right Atrium: The right atrium is normal in size. Aortic Valve: The aortic valve was not well visualized. Aortic valve regurgitation was not assessed. Mitral Valve: The mitral valve is normal in structure. Mitral valve regurgitation was not assessed. Tricuspid Valve: The tricuspid valve is structurally  normal. Tricuspid regurgitation was not assessed. Pulmonic Valve: The pulmonic valve is structurally normal. Pulmonic valve regurgitation was not assessed. Pericardium: There is a trivial to small pericardial effusion. Aorta: The aortic root was not assessed.  CONCLUSIONS:  1. Left ventricular systolic function is moderately to severely decreased with a 30% estimated ejection fraction.  2. There is global hypokinesis of the left ventricle with minor regional variations.  3. Abnormal septal motion consistent with post-operative status.  4. There is moderate concentric left ventricular hypertrophy.  5. There is moderately reduced right ventricular systolic function. QUANTITATIVE DATA SUMMARY:  [id number] Kei Lozano MD Electronically signed on 2/27/2024 at 11:08:39 AM  ** Final **     XR chest 1 view    Result Date: 2/27/2024  Interpreted By:  Raman Roa, STUDY: XR CHEST 1 VIEW;  2/27/2024 4:02 am   INDICATION: Signs/Symptoms:icu.   COMPARISON: Chest radiograph dated 2/26/2024   ACCESSION NUMBER(S): [id number]   ORDERING CLINICIAN: SHELLI ESPITIA   FINDINGS: AP radiograph of the chest   The right internal jugular swans Estevan catheter is positioned within the right pulmonary artery. The left internal jugular catheter is positioned within the superior vena cava near the origin of the brachiocephalic vein. Sternal sutures are intact. Balloon pump radiopaque marker appears to have been advanced since the prior study. The tip of the fiduciary marker is closer to the aortic arch. ECMO device is noted.   The heart is normal in size. The retrocardiac left lower lobe left diaphragm and costophrenic sulcus are obscured by the left ventricle. No acute pulmonary infiltrates are seen. There is improved pulmonary aeration within the right lower lobe and decreased discoid atelectasis.       1. Improved pulmonary aeration and improved pulmonary edema in comparison to the prior study       Signed by: Raman Roa 2/27/2024 10:39 AM  Dictation workstation:   [id number]    XR chest 1 view    Result Date: 2/27/2024  Interpreted By:  Raman Roa, STUDY: XR CHEST 1 VIEW;  2/26/2024 3:45 am   INDICATION: Signs/Symptoms:Post op cardiac surgery.   COMPARISON: Chest radiograph dated 02/24/2024   ACCESSION NUMBER(S): [id number]   ORDERING CLINICIAN: LUBNA RIBERA   FINDINGS: AP radiograph of the chest Limitations: Decreased lung volumes.   The swans Estevan catheter is positioned within the right pulmonary artery. The ECMO cannula is in satisfactory position. Balloon pump radiopaque marker is overlying the mid descending thoracic aorta proximally 5 cm from the aortic arch. The left internal jugular catheter is positioned within superior vena cava near the origin of the brachiocephalic vein.   The heart is mildly enlarged.   Interstitial pulmonary edema is improved compared to previous study. There is mild elevation of the right diaphragm. Subsegmental atelectasis above the right diaphragm is noted. Blunting of the left costophrenic sulcus noted.       1. Improved interstitial pulmonary edema in particular within the right lung in comparison to the previous study. 2. Subsegmental atelectasis above the mildly elevated right diaphragm 3. Small left pleural effusion 4. The heart appears enlarged probably magnified by the projection       Signed by: Raman Roa 2/27/2024 8:09 AM Dictation workstation:   [id number]    Transthoracic Echo (TTE) Limited    Result Date: 2/26/2024   [facility], [address] [place] [address]                Tel [phone] and Fax [phone] TRANSTHORACIC ECHOCARDIOGRAM REPORT  Patient Name:     MIGUEL DIMAS      Reading           [id number] Abel BASS                Physician:        MD Study Date:       2/25/2024            Ordering          [id number] JILL WALTERS                                        Provider: MRN/PID:          [id number]             Fellow: Accession#:        [id number]         Nurse: Date of           1990 / 33 years  Sonographer:      Tomas Moran Birth/Age: Gender:           F                    Additional Staff: Weight:                                Admission Status: Inpatient - Routine BSA / BMI:        m2 / kg/m2           Encounter#:       [id number] Study Type:    TRANSTHORACIC ECHO (TTE) LIMITED Diagnosis/ICD: Cardiogenic shock-R57.0  Study Detail: The following Echo studies were performed: 2D.  PHYSICIAN INTERPRETATION: Left Ventricle: The left ventricular systolic function is severely decreased. There is global hypokinesis of the left ventricle with minor regional variations. The left ventricular cavity size is mild to moderately dilated. Abnormal (paradoxical) septal motion consistent with post-operative status. Left ventricular diastolic filling was not assessed. Left Atrium: The left atrium is consistent with transplant. Right Ventricle: The right ventricle is mildly enlarged. There is reduced right ventricular systolic function. A device is visualized in the right ventricle. Right Atrium: The right atrium is consistent with a transplant. Aortic Valve: The aortic valve appears structurally normal. Aortic valve regurgitation was not assessed. Mitral Valve: The mitral valve is normal in structure. Mitral valve regurgitation was not assessed. Tricuspid Valve: The tricuspid valve is structurally normal. Tricuspid regurgitation was not assessed. Pulmonic Valve: The pulmonic valve was not assessed. Pulmonic valve regurgitation was not assessed. Pericardium: There is a small pericardial effusion. Aorta: The aortic root is normal. In comparison to the previous echocardiogram(s): Compared with study from 2/23/2024, may be a very small imporvement in LV systolic function but overall still severely depressed.  CONCLUSIONS:  1. Left ventricular systolic function is severely decreased.  2. Abnormal septal motion consistent with post-operative status.  3.  There is reduced right ventricular systolic function.  4. Compared with study from 2/23/2024, may be a very small imporvement in LV systolic function but overall still severely depressed.  5. There is global hypokinesis of the left ventricle with minor regional variations. QUANTITATIVE DATA SUMMARY:  [id number] Abel Gurrola MD Electronically signed on 2/26/2024 at 6:00:42 PM ** Final **     XR chest 1 view    Result Date: 2/25/2024  Interpreted By:  Angelina Cagle, STUDY: XR CHEST 1 VIEW;  2/25/2024 3:43 am   INDICATION: Signs/Symptoms:CTICU.   COMPARISON: Radiograph dated 02/24/2024, 5:03 p.m.   ACCESSION NUMBER(S): [id number]   ORDERING CLINICIAN: NICOLE NDIAYE   FINDINGS: Enteric tube is in place with the tip outside the field of view. Right IJ [place]-Estevan catheter is in place with the tip projecting over the right main pulmonary artery. Left IJ central venous catheter is projecting over the left brachiocephalic vein. Intra-aortic balloon pump radiopaque marker is projecting over the proximal descending thoracic aorta. Pulmonary artery pressure monitoring device is projecting over the left lung hilum. Again seen ECMO cannula projecting over lower SVC.   Status post median sternotomy. The cardiac silhouette size is unchanged.   Again seen mild interstitial pulmonary edema. No focal infiltrate or pneumothorax. Question trace left pleural effusion.   No acute osseous change.       1. Unchanged mild interstitial pulmonary edema. Cannot exclude trace left pleural effusion. 2. Medical devices and postsurgical changes as described above       Signed by: Angelina Narayan 2/25/2024 7:46 AM Dictation workstation:   [id number]    XR chest 1 view    Result Date: 2/25/2024  Interpreted By:  Angelina Cagle, STUDY: XR CHEST 1 VIEW;  2/24/2024 5:10 pm   INDICATION: Signs/Symptoms:IABP repositioned.   COMPARISON: Radiograph dated 02/24/2024   ACCESSION NUMBER(S): [id number]   ORDERING CLINICIAN: JILL  GOLD   FINDINGS: Enteric tube is in place with the tip outside the field of view. Right IJ [place]-Estevan catheter is in place with the tip projecting over the right main pulmonary artery. Left IJ central venous catheter is projecting over the left brachiocephalic vein. Intra-aortic balloon pump radiopaque marker is projecting over the aortic knob. Pulmonary artery pressure monitoring device is projecting over the left lung hilum. Again seen ECMO cannula projecting over lower SVC.   Status post median sternotomy. The cardiac silhouette size is unchanged.   Slight interval increase in interstitial edema. No focal infiltrate or pneumothorax. Question trace left pleural effusion.   No acute osseous change.       1. Slight interval worsening of pulmonary edema. 2. Bibasilar atelectasis and suggestion of trace left pleural effusion. 3. Medical devices and postsurgical changes as described above.       Signed by: Angelina Narayan 2/25/2024 7:44 AM Dictation workstation:   [id number]    XR abdomen 1 view    Result Date: 2/24/2024  Interpreted By:  Angelina Cagle, STUDY: XR ABDOMEN 1 VIEW;  2/24/2024 3:23 pm   INDICATION: Signs/Symptoms:dobhoff placement post-extubation.   COMPARISON: Radiograph dated 02/21/2024   ACCESSION NUMBER(S): [id number]   ORDERING CLINICIAN: JILL WALTERS   FINDINGS: Single-view of the abdomen. Lower pelvis is not included. Dobbhoff catheter is projecting over the distal 2nd portion of the duodenum. Nonobstructive bowel gas pattern.  Limited evaluation of pneumoperitoneum on supine imaging, however no gross evidence of free air is noted.   Partially imaged ECMO cannula in sternotomy wires.   Osseous structures demonstrate no acute bony changes.       1.  Nonobstructive bowel gas pattern. 2. Medical devices as described above   Signed by: Angelina Narayan 2/24/2024 4:32 PM Dictation workstation:   [id number]    XR chest 1 view    Result Date: 2/24/2024  Interpreted By:  Celso Narayan  Angelina, STUDY: XR CHEST 1 VIEW;  2/24/2024 4:15 am   INDICATION: Signs/Symptoms:Post op cardiac surgery.   COMPARISON: Radiograph dated 02/23/2024   ACCESSION NUMBER(S): [id number]   ORDERING CLINICIAN: LUBNA RIBERA   FINDINGS: ET tube is terminating 1.6 cm from the remedios. Enteric tube is in place with the tip outside the field of view. Right IJ approach [place]-Estevan catheter is projecting over the distal right main pulmonary artery. Intra-aortic balloon pump radiopaque markers projecting over proximal descending thoracic aorta. ECMO cannulas projecting over the lower SVC. A left IJ central venous catheter is projecting over the expected location of the confluence of brachiocephalic veins. Pulmonary artery pressure monitoring device is projecting over the left lung hilum.   The cardiac silhouette size is within normal limits. Status post median sternotomy.   There is no focal consolidation, edema or pneumothorax. No sizeable pleural effusion. No acute osseous abnormality.       1. Medical devices and postsurgical changes as described above. 2. No focal infiltrate, sizeable pleural effusion, edema or pneumothorax. Mild bibasilar atelectasis.       Signed by: Angelina Narayan 2/24/2024 8:36 AM Dictation workstation:   [id number]    Transthoracic Echo (TTE) Limited    Result Date: 2/23/2024   [facility], [address] [place] [address]                Tel [phone] and Fax [phone] TRANSTHORACIC ECHOCARDIOGRAM REPORT  Patient Name:      MIGUEL DIMAS      Reading Physician:    [id number] Enedelia Kowalski MD Study Date:        2/23/2024            Ordering Provider:    [id number] ODILON FUENTES MRN/PID:           [id number]             Fellow: Accession#:        [id number]         Nurse: Date of Birth/Age: 1990 / 33 years  Sonographer:          Bernadette Madsen                                                                [facility] Gender:            F                    Additional Staff: Height:            152.40 cm            Admit Date:           2/15/2024 Weight:            99.34 kg             Admission Status:     Inpatient -                                                               Routine BSA / BMI:         1.94 m2 / 42.77      Encounter#:           [id number]                    kg/m2                                         Department Location:  [facility] Blood Pressure: 117 /63 mmHg Study Type:    TRANSTHORACIC ECHO (TTE) LIMITED Diagnosis/ICD: Cardiogenic shock-R57.0 Indication:    Turn down trial CPT Code:      Echo Limited-82239 Patient History: Pertinent History: Heart transplant (03/2022), IABP 2/18, ECMP 2/19, intubated                    2/21, cardiogenic shock, allograft rejection. Study Detail: The following Echo studies were performed: 2D. The patient is               intubated and on a balloon pump.  PHYSICIAN INTERPRETATION: Left Ventricle: The left ventricular systolic function is severely decreased, with an estimated ejection fraction of 10%. The left ventricular cavity size is normal. Left ventricular diastolic filling was not assessed. Left Atrium: The left atrium is consistent with transplant. Right Ventricle: The right ventricle is normal in size. There is severely reduced right ventricular systolic function. Right Atrium: The right atrium is consistent with a transplant. Aortic Valve: The aortic valve is trileaflet. Aortic valve regurgitation was not assessed. Mitral Valve: The mitral valve is normal in structure. Mitral valve regurgitation was not assessed. Tricuspid Valve: The tricuspid valve was not well visualized. Tricuspid regurgitation was not assessed. Pulmonic Valve: The pulmonic valve is structurally normal. Pulmonic valve regurgitation was not assessed. Pericardium: There is no pericardial effusion noted. Aorta: The aortic root is  normal.  CONCLUSIONS:  1. Left ventricular systolic function is severely decreased with a 10% estimated ejection fraction.  2. Poorly visualized anatomical structures due to suboptimal image quality.  3. There is severely reduced right ventricular systolic function.  4. ECMO turn down trial . At 3 L the ejection fraction was globaly and severely reduced at 10% and remained stable as the flow was gradually decreased down to 1L. QUANTITATIVE DATA SUMMARY: 2D MEASUREMENTS:                          Normal Ranges: IVSd:          1.00 cm   (0.6-1.1cm) LVPWd:         1.00 cm   (0.6-1.1cm) LVIDd:         4.40 cm   (3.9-5.9cm) LVIDs:         4.00 cm LV Mass Index: 76.2 g/m2 LV % FS        9.1 %  [id number] Enedelia Kowalski MD Electronically signed on 2/23/2024 at 3:28:33 PM  ** Final **     XR chest 1 view    Result Date: 2/23/2024  Interpreted By:  Duarte Guillory, STUDY: XR CHEST 1 VIEW;  2/23/2024 1:13 pm   INDICATION: Signs/Symptoms:PA catheter positioning..   COMPARISON: Radiographs since 02/20/2024, the most recent 02/23/2024 at 3:26 a.m..   ACCESSION NUMBER(S): [id number]   ORDERING CLINICIAN: ODILON FUENTES   FINDINGS: Again identified are midline sternotomy sutures, endotracheal and enteric tubes, pulmonary arterial catheter, and right central venous line. The tip of the pulmonary arterial catheter appears to lie in the distal right pulmonary artery or its proximal interlobar branch. The tip of the endotracheal tube lies 2.6 cm above the remedios.   Heart size is within upper limits of normal and unchanged since previous.   There is no convincing pulmonary parenchymal mass nor infiltrate on the current study. There is also no significant pleural fluid or pneumothorax.   Osseous structures are grossly intact.         1. Tubes and lines in place, as noted. 2. No other significant acute cardiopulmonary process on the current examination       MACRO: None   Signed by: Duarte Guillory 2/23/2024 1:59 PM Dictation workstation:    [id number]    XR chest 1 view    Result Date: 2/23/2024  Interpreted By:  Duarte Guillory, STUDY: XR CHEST 1 VIEW;  2/23/2024 3:32 am   INDICATION: Signs/Symptoms:Post op cardiac surgery.   COMPARISON: 02/22/2024.   ACCESSION NUMBER(S): [id number]   ORDERING CLINICIAN: LUBNA RIBERA   FINDINGS: Midline sternotomy sutures are again noted. Also again identified are endotracheal and enteric tubes, pulmonary arterial catheter, and left internal jugular venous line. Tip of IABP is projected along the upper descending thoracic aorta at the approximate level of the left hilum.   The tip of the endotracheal tube lies 2.7 cm above the remedios.   Lung volumes are slightly diminished. There is questionable mild atelectasis, infiltrate, or pleural fluid at the left lung base, the appearance of which could be at least partially artifactual. Remaining lung fields are otherwise clear. There is no pneumothorax.       1.  Questionable mild left basilar density, which may also been present on the previous day. 2. No other significant acute interval change.       MACRO: None   Signed by: Duarte Guillory 2/23/2024 1:57 PM Dictation workstation:   [id number]    XR chest 1 view    Result Date: 2/23/2024  Interpreted By:  Duarte Guillory, STUDY: XR CHEST 1 VIEW;  2/22/2024 3:46 am   INDICATION: Signs/Symptoms:Post op cardiac surgery.   COMPARISON: Radiographs since 02/20/2024, the most recent 02/21/2024.   ACCESSION NUMBER(S): [id number]   ORDERING CLINICIAN: LUBNA RIBERA   FINDINGS: Again identified are midline sternotomy sutures.   Endotracheal and enteric tubes are again noted. The tip of the endotracheal tube lies 1.6 cm above the remedios.   Tip of the pulmonary arterial catheter is faintly visualized within the main or right pulmonary artery. Left internal jugular venous line is also again noted. Metallic tip of the IABP is again noted along the upper descending thoracic aorta, similar to previous.   Heart size is within upper limits  of normal and unchanged since previous.   There are suspicious developing atelectasis or consolidation and pleural fluid at the left base compared to previous. Remaining lung fields are clear other significant new mass or infiltrate. There is no significant pleural fluid on the right. There is no pneumothorax on either side.       1.  Possible developing atelectasis or consolidation and pleural fluid at the left lung base compared to previous. 2. Other findings, as discussed, similar to previous.       MACRO: None   Signed by: Duarte Guillory 2/23/2024 1:54 PM Dictation workstation:   [id number]    Transthoracic Echo (TTE) Limited    Result Date: 2/22/2024   [facility], [address] [place] [address]                Tel [phone] and Fax [phone] TRANSTHORACIC ECHOCARDIOGRAM REPORT  Patient Name:      MIGUEL Camara Physician:    [id number] Param BASS MD Study Date:        2/22/2024            Ordering Provider:    [id number] KIM MEHTA MRN/PID:           [id number]             Fellow: Accession#:        [id number]         Nurse: Date of Birth/Age: 1990 / 33 years  Sonographer:          Adryan Meredith RDCS Gender:            F                    Additional Staff: Height:            154.94 cm            Admit Date:           2/15/2024 Weight:            99.34 kg             Admission Status:     Inpatient -                                                               Routine BSA / BMI:         1.96 m2 / 41.38      Encounter#:           [id number]                    kg/m2                                         Department Location:  [facility] Blood Pressure: 124 /64 mmHg Study Type:    TRANSTHORACIC ECHO (TTE) LIMITED Diagnosis/ICD: Cardiogenic shock-R57.0 Indication:     cardiogenic shock CPT Code:      Echo Limited-79999; Doppler Limited-38743; Color Doppler-86715 Patient History: Pertinent History: Heart trasnplant rejection, VA ecmo 2/19, intubated 2/21,                    IABP 2/18. Study Detail: The following Echo studies were performed: 2D, M-Mode, Doppler and               color flow. Technically challenging study due to body habitus,               patient lying in supine position and ECMO. The patient is               intubated. Definity used as a contrast agent for endocardial               border definition. Total contrast used for this procedure was 3.0               mL via IV push.  PHYSICIAN INTERPRETATION: Left Ventricle: The left ventricular systolic function is severely decreased, with an estimated ejection fraction of 10%. There is global hypokinesis of the left ventricle with minor regional variations. The left ventricular cavity size is normal. Left ventricular diastolic filling was not assessed. Left Atrium: The left atrium is consistent with transplant. Right Ventricle: The right ventricle is normal in size. There is severely reduced right ventricular systolic function. Right Atrium: The right atrium is consistent with a transplant. There is a device visualized in the right atrium. Aortic Valve: The aortic valve is trileaflet. There is mild aortic valve regurgitation. Mitral Valve: The mitral valve is mildly thickened. There is mild to moderate mitral valve regurgitation. Tricuspid Valve: The tricuspid valve is structurally normal. There is mild tricuspid regurgitation. The right ventricular systolic pressure is unable to be estimated. Pulmonic Valve: The pulmonic valve is structurally normal. Pulmonic valve regurgitation was not assessed. Pericardium: There is a trivial pericardial effusion. Aorta: The aortic root is normal.  CONCLUSIONS:  1. Left ventricular systolic function is severely decreased with a 10% estimated ejection fraction.  2. There is severely  reduced right ventricular systolic function.  3. Mild to moderate mitral valve regurgitation.  4. Mild aortic valve regurgitation.  5. There is global hypokinesis of the left ventricle with minor regional variations. QUANTITATIVE DATA SUMMARY: 2D MEASUREMENTS:                          Normal Ranges: LAs:           3.50 cm   (2.7-4.0cm) IVSd:          0.80 cm   (0.6-1.1cm) LVPWd:         0.80 cm   (0.6-1.1cm) LVIDd:         4.80 cm   (3.9-5.9cm) LVIDs:         4.60 cm LV Mass Index: 64.5 g/m2 LV % FS        4.2 % TRICUSPID VALVE/RVSP:                             Normal Ranges: Peak TR Velocity: 2.36 m/s RV Syst Pressure: 25.3 mmHg (< 30mmHg)  [id number] Param Doan MD Electronically signed on 2/22/2024 at 4:13:29 PM  ** Final **     XR abdomen 1 view    Result Date: 2/21/2024  Interpreted By:  Duarte Guillory and Ogievich Taessa STUDY: XR ABDOMEN 1 VIEW;  2/21/2024 9:09 am   INDICATION: Signs/Symptoms:dobhoff.   COMPARISON: Abdominal radiograph 02/19/2024   ACCESSION NUMBER(S): [id number]   ORDERING CLINICIAN: KIM MEHTA   FINDINGS: AP semi-erect abdominal radiograph.   LINES/TUBES/DEVICES: Interval insertion of Dobhoff enteric tube with distal tip overlying the expected location of the 2nd portion of the duodenum. Partial visualization of the [place]-Estevan catheter and ECMO cannula with distal tips out of the field of view.   ABDOMEN: Pelvis is out of the field of view, within this limitation, there is mildly distended gaseous filled bowel loops. Limited evaluation of pneumoperitoneum on supine imaging, however no gross evidence of free air is noted.   LUNGS: Left basilar density likely representing edema, atelectasis, or consolidation. See same day chest x-ray.   BONE: Osseous structures demonstrate no acute bony changes.       1. Interval insertion of Dobhoff enteric tube with distal tip overlying the expected location of the 2nd portion of the duodenum. 2. Suboptimal radiograph with the pelvis out of the field  of view. Within this limitation, there is mildly distended gaseous filled bowel loops. Recommend clinical correlation with ileus.   I personally reviewed the images/study and I agree with the findings as stated by Heather Lopez DO, PGY-2. This study was interpreted at [facility], [place], [place].   MACRO: None   Signed by: Duarte Guillory 2/21/2024 10:28 AM Dictation workstation:   [id number]    Transthoracic Echo (TTE) Limited    Result Date: 2/21/2024   [facility], [address] [place] [address]                Tel [phone] and Fax [phone] TRANSTHORACIC ECHOCARDIOGRAM REPORT  Patient Name:      MIGUEL Camara Physician:    [id number] Kei BASS MD Study Date:        2/20/2024            Ordering Provider:    [id number] ZEESHAN RAHMAN MRN/PID:           [id number]             Fellow:               [id number] Zeeshan Rahman MD Accession#:        [id number]         Nurse: Date of Birth/Age: 1990 / 33 years  Sonographer:          RENETTA RAHMAN Gender:            F                    Additional Staff: Height:                                 Admit Date:           2/15/2024 Weight:                                 Admission Status:     Inpatient - STAT BSA / BMI:         m2 / kg/m2           Encounter#:           [id number]                                         Department Location:  [facility] Study Type:    TRANSTHORACIC ECHO (TTE) LIMITED Diagnosis/ICD: Heart transplant rejection-T86.21 Indication:    s/p Heart Transplant CPT Code:      Echo Limited-27844; Doppler Limited-88551; Color Doppler-91260  Study Detail: The following Echo studies were performed: 2D, M-Mode, Doppler and               color flow.  PHYSICIAN INTERPRETATION: Left Ventricle: The left  ventricular systolic function is severely decreased, with an estimated ejection fraction of 10%. There is global hypokinesis of the left ventricle with minor regional variations. The left ventricular cavity size is normal. Left ventricular diastolic filling was not assessed. Left Atrium: The left atrium is consistent with transplant. Right Ventricle: The right ventricle is normal in size. There is severely reduced right ventricular systolic function. A device is visualized in the right ventricle. Right Atrium: The right atrium is consistent with a transplant. There is a device visualized in the right atrium. Aortic Valve: The aortic valve is probably trileaflet. There is mild aortic valve regurgitation. Mitral Valve: The mitral valve is mildly thickened. There is moderate mitral valve regurgitation. Tricuspid Valve: The tricuspid valve is structurally normal. There is mild tricuspid regurgitation. The right ventricular systolic pressure is unable to be estimated. Pulmonic Valve: The pulmonic valve is not well visualized. Pulmonic valve regurgitation was not assessed. Pericardium: There is a trivial pericardial effusion. Aorta: The aortic root was not well visualized. There is no dilatation of the aortic root.  CONCLUSIONS:  1. Limited fellow echo.  2. Left ventricular systolic function is severely decreased with a 10% estimated ejection fraction.  3. There is global hypokinesis of the left ventricle with minor regional variations.  4. There is severely reduced right ventricular systolic function.  5. Mild aortic valve regurgitation.  6. Moderate mitral valve regurgitation. QUANTITATIVE DATA SUMMARY: AORTA MEASUREMENTS:                      Normal Ranges: Ao Sinus, d: 3.00 cm (2.1-3.5cm)  [id number] Kei Lozano MD Electronically signed on 2/21/2024 at 10:17:52 AM  ** Final **     XR chest 1 view    Result Date: 2/21/2024  Interpreted By:  Duarte Guillory, STUDY: XR CHEST 1 VIEW;  2/21/2024 3:27 am   INDICATION:  Signs/Symptoms:Post op cardiac surgery.   COMPARISON: 02/21/2024, 1:57 a.m..   ACCESSION NUMBER(S): [id number]   ORDERING CLINICIAN: LUBNA RIBERA   FINDINGS: Heart size is unchanged.   Again identified are midline sternotomy sutures, IABP tip along the upper descending thoracic aorta, and left internal jugular venous catheter. The tip of a  pulmonary arterial catheter most likely lies in the right pulmonary artery.   There are persistent diffuse bilateral pulmonary densities that could represent either edema or (less likely) consolidation. There may also be a left basilar pleural effusion. These findings are either similar to or minimally less prominent than on the radiograph obtained earlier the same date. There is no pneumothorax.   Endotracheal tube has been inserted. Its tip lies approximately 0.7 cm above the remedios.       1.  Bilateral pulmonary densities most likely representing edema. Small left pleural effusion. Findings are either unchanged or at most minimally improved compared to previous. 2. Interval placement of endotracheal tube with tip just above remedios.       MACRO: None   Signed by: Duarte Guillory 2/21/2024 9:09 AM Dictation workstation:   [id number]    XR chest 1 view    Result Date: 2/21/2024  Interpreted By:  Duarte Guillory and Meyers Emily STUDY: XR CHEST 1 VIEW;  2/21/2024 2:02 am   INDICATION: Signs/Symptoms:rhonchi.   COMPARISON: Chest radiograph 02/20/2024   ACCESSION NUMBER(S): [id number]   ORDERING CLINICIAN: ENRRIQUE CRISTINA   FINDINGS: AP radiograph of the chest was provided.   Right IJ approach [place]-Estevan catheter with its tip overlying the expected location of the right main pulmonary artery. Left IJ central venous catheter with its tip overlying the brachiocephalic vein. Stable appearance of an ECMO cannula overlying the expected location of the right atrium. IABP radiopaque marker again overlies the upper descending thoracic aorta. Postsurgical change of prior median sternotomy  with cerclage wires.   CARDIOMEDIASTINAL SILHOUETTE: Cardiomediastinal silhouette is normal in size and configuration.   LUNGS: Left lung base is not entirely included. Pulmonary vascularity appears more prominent than on the previous day. There also appear to be new diffuse patchy areas of pulmonary edema or consolidation (more likely the former), right-greater-than-left.   BONES: No acute osseous changes.       1. Interval development of increased pulmonary vascularity and airspace opacities, more prominent on the right. Although nonspecific, findings most likely represent pulmonary edema; pneumonia less likely but can not be entirely excluded. Recommend correlation with patient's volume status. 2. Medical lines and devices as detailed above.   I personally reviewed the images/study, and I agree with the findings as stated above. This study was interpreted at [place], [place].   MACRO: None   Signed by: Duarte Guillory 2/21/2024 9:07 AM Dictation workstation:   [id number]    XR chest 1 view    Result Date: 2/21/2024  Interpreted By:  Duarte Guillory and Jiang Sirui STUDY: XR CHEST 1 VIEW;  2/20/2024 5:49 pm   INDICATION: Signs/Symptoms:tachypnea.   COMPARISON: Chest radiograph 02/20/2024   ACCESSION NUMBER(S): [id number]   ORDERING CLINICIAN: LUBNA RIBERA   FINDINGS: AP radiograph of the chest was provided.   Stable positioning of the right IJ approach [place]-Estevan catheter, which is seen with distal tip overlying the main pulmonary artery. Left IJ central venous catheter distal tip overlying the brachiocephalic. Stable positioning of the ECMO cannula overlying the upper right atrium. Stable positioning of the IABP with tip at approximate level of the left hilum. Prior median sternotomy with intact sternal cerclage wires. Abandoned epicardial pacer wires are noted.   CARDIOMEDIASTINAL SILHOUETTE: Cardiomediastinal silhouette is stable in size and configuration.   LUNGS:  Suggestion of a left-sided pleural effusion. No consolidation or pneumothorax.   ABDOMEN: No remarkable upper abdominal findings.   BONES: No acute osseous changes.       1.  Suggestion of small left-sided pleural effusion and bibasilar atelectasis. No pneumothorax. 2.  Medical devices as above.   I personally reviewed the image(s) / study and I agree with the findings as stated by Gera Pryor MD. This study was interpreted at [facility], [place], [place].   MACRO: None   Signed by: Duarte Guillory 2/21/2024 9:05 AM Dictation workstation:   [id number]    XR chest 1 view    Result Date: 2/20/2024  Interpreted By:  Duarte Guillory,  Nevin Romero STUDY: XR CHEST 1 VIEW;  2/20/2024 3:18 pm   INDICATION: Signs/Symptoms:s/p line placement. Admitted to CTICU for cardiogenic shock. Started on VA ECMO cannulation 2/19.   COMPARISON: Chest x-ray 02/20/2021, 02/19/2024, 2/18/2024, 02/17/2024   ACCESSION NUMBER(S): [id number]   ORDERING CLINICIAN: LUBNA RIBERA   FINDINGS: AP semi-erect radiograph of the chest   LINES/TUBES/DEVICES: Stable postsurgical changes of median sternotomy with sternotomy wires intact and surgical clips overlying the mediastinum. Left IJ central venous catheter with distal tip overlying the expected location of the left brachiocephalic vein. Right-sided IJ approach [place]-Estevan catheter is visualized with the distal tip projecting over the expected region of the right main pulmonary artery. Tip of the intra-aortic balloon pump projects over the expected location of the aortic arch, similar to prior. Partially visualized VA ECMO cannulation with distal tip overlying T7 vertebral body, similar to prior.   CARDIOMEDIASTINAL SILHOUETTE: Enlarged cardiomediastinal silhouette is stable in size and configuration when compared to prior.   LUNGS: Asymmetrical elevation of the right hemidiaphragm relative to the left. Similar mild right perihilar streaky opacity favored to represent atelectasis  or consolidation. No sizable pleural effusion or significant pneumothorax.   BONES: No acute osseous abnormality.       1. Medical devices described above. 2. Similar mild right perihilar streaky opacity favored to represent atelectasis or consolidation. 3. Stable cardiomegaly.   I personally reviewed the images/study and I agree with the findings as stated by Heather Lopez DO, PGY-2. This study was interpreted at [facility], [place], [place].   Signed by: Duarte Guillory 2/20/2024 3:53 PM Dictation workstation:   [id number]    Cardiac catheterization - non-coronary    Result Date: 2/20/2024   [facility], Cath Lab, [address] [place] [address] Cardiovascular Catheterization Report Patient Name:      MIGUEL BASS Performing Physician:  [id number] Lexie Wright MD Study Date:        2/20/2024             Verifying Physician:   Emily Wright MD MRN/PID:           [id number]              Cardiologist/Co-scrub: Accession#:        [id number]          Ordering Physician:    [id number] J BORGES Date of Birth/Age: 1990 / 33 years   Fellow: Gender:            F                     Fellow: Encounter#:        [id number]  Study:            Endomyocardial Biopsy Additional Study: Other  Indications: Procedure performed to evaluate a recipient of a cardiac transplant.  Procedure Description Comments: Patient arrived to lab with a 9 Fr right IJ sheath. I attempted to use this sheath to perform biopsy but due to ECMO cannula in right atrium I was unable to advance it appropriately. I replaced the 9 Fr sheath with a long braided sheath, and advanced the tip of this sheath to the mid right ventricle. Through this I obtained biopsy samples. I then replaced the long sheath with a standard 9 Fr sheath and floated a swan for use in the  LINDA  Hemo Personnel: +-------------------+---------+ Name               Duty      +-------------------+---------+ Lexie Wright MD 1 +-------------------+---------+  Hemodynamic Pressures:  +----+---------------------+----------+-------------+--------------+---------+ Site      Date Time      Phase NameSystolic mmHgDiastolic mmHgMean mmHg +----+---------------------+----------+-------------+--------------+---------+  Art2/20/2024 12:09:29 PM      Rest          123            75       94 +----+---------------------+----------+-------------+--------------+---------+  Oxygen Saturation %: +-----------+----------+------------+ Sample SiteO2 Sat (%)HB (g/100ml) +-----------+----------+------------+          FA        99         7.9 +-----------+----------+------------+          PA        75         7.9 +-----------+----------+------------+  Cardiac Outputs: +---------------+------------------+-------+ LISA CO (l/min)LISA CI (l/min/m2)LISA SV +---------------+------------------+-------+             9.7               4.8   84.0 +---------------+------------------+-------+  Complications: No in-lab complications observed.  Cardiac Cath Post Procedure Notes: Post Procedure           Heart transplant rejection. Diagnosis: Blood Loss:              Estimated blood loss during the procedure was Minimal                          mls. Specimens Removed:       Number of specimen(s) removed: 4. ____________________________________________________________________________________ CONCLUSIONS:  1. Endomyocardial biopsy performed and 4 samples sent to Pathology.  2. New swan placed in right IJ for ICU use. ICD 10 Codes: Heart transplant rejection-T86.21  CPT Codes: Moderate Sedation Services 2nd additional 15 minutes patient >5 years-56751; Moderate Sedation Services 1st additional 15 minutes patient >5 years-48957; Moderate Sedation Services 3rd additional 15 minutes patient >5  years-32134; Endomyocardial Biopsy-37618  [id number] Lexie Wright MD Performing Physician Electronically signed by [id number] Lexie Wright MD on 2/20/2024 at 3:23:48 PM  ** Final **          Assessment/Plan   Charla Bowles is a 35 yo heart transplant recpient (3/2023) who presented on 2/15/24 with signs of acute cellular rejection on heart biopsy 2/16  and multiorgan dysfunction in the setting of ADHF. Multiple pressors started on admission. IABP started on 2/18.  Was on VA-ECMO 2/19-  , the impella 5.5 currently on P8. She has received pulse steroids, was maintained on tacrolimus/sirolimus, 2 sessions of IVIG/plasmapheresis on 2/18, 2/20 and 2 doses of Thymoglobulin 2/18 and 2/19.  Following an endomyocardial biopsy 2/20/2024 which was negative for rejection (in the setting of negative DSAs) - the Thymoglobulin/IVIG/plasmapheresis sessions were stopped. Then treated again with 5 treatments of IVIG/plasmapheresis from 3/5/24. We are continuing to follow up for symptom management and support; and eventual GOC but will hold off till next week, hoping she can improve.      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 possible SLE cardiomyopathy)  #CHF with severely reduced EF s/p ICD  #Acute on chronic CHF requiring inotropic support  #Pulmonary HTN  #SLE  #Hypothyroidism  #Nausea  #Hemorrhoids     #Acute Back Pain   - Low back pain and B/L knee pain; pt unable to quantify or describe pain   -Pt appears drowsy at time of exam, A&Ox3 however repeating certain phrases. Pt has received Dilaudid 0.2 mg IV x 5 doses and oxycodone 5 mg PO x 1 dose in the past 24 hours (Total OME = 17.5)  - Recommend discontinuing Dilaudid 0.2 mg IV given drowsiness in the setting of ongoing kidney and hepatic impairment   - Recommend scheduling Tylenol 975 mg TID instead of PRN. Monitor LFTs closely  - Recommend starting cyclobenzapine 5 mg TID for low back pain. This was reviewed with transplant  pharmacist and confirmed adequate dosing given renal and hepatic impairment  - Recommend continuing oxycodone 5 mg PO q4h PRN for pain   - Recommend lidocaine patch q12h for pain        #psychosocial support  - Music therapy  - Spiritual care support  - Art therapy        Thank you for allowing us to care for this patient. Palliative Team will continue to follow as needed. Please contact team with any questions or concerns.     JIMMY Johnson-CNP   Team pager [phone] (weekdays)    I spent 60 minutes in the care of this patient which included chart review, interviewing patient/family, discussion with primary team, coordination of care, and documentation.    Medical Decision Making was high level due to high complexity of problems, extensive data review, and high risk of management/treatment.

## 2024-03-26 NOTE — PROGRESS NOTES
[facility]  HFICU PROGRESS NOTE    Charla Bowles/[id number]    Admit Date: 2/15/2024  Hospital Length of Stay: 40   ICU Length of Stay: 15d 15h   Primary Service: HFICU  Primary HF Cardiologist: Dr. Cornell  Referring: Dr Cornell     INTERVAL EVENTS / PERTINENT ROS:     Overnight on Impella P6 , Dobutamine drip at 5 mcg/kg/min,  Epinephrine gtt @ 0.02 mcg/kg/min and CVVHD, with net negative 1 L last 24 hours.      LFT, LDH, Latate  all down trending ,  juandice looking,       PLAN:   -   CVVH continues with net goal fluid removal of 100 mL/hour.   -   Cancelled IR for tunneled HD line placement today, IR attending feel strongly she is not                 MEDICATIONS  Infusions:  DOBUTamine, Last Rate: 5 mcg/kg/min (03/26/24 0409)  EPINEPHrine, Last Rate: 0.02 mcg/kg/min (03/26/24 0409)  heparin Impella Purge 25 units/mL in 500 mL D5W, Last Rate: 10 mL/hr (03/26/24 0409)  PrismaSol 4/2.5, Last Rate: 25 mL/kg/hr (03/25/24 1351)      Scheduled:  [Held by provider] aspirin, 81 mg, Daily  calcitriol, 0.5 mcg, Daily  darbepoetin flyod, 100 mcg, q14 days  ferrous sulfate (325 mg ferrous sulfate), 65 mg of iron, Daily with breakfast  folic acid, 1 mg, Daily  insulin lispro, 0-10 Units, Before meals & nightly  levothyroxine, 200 mcg, Daily  melatonin, 10 mg, Nightly  multivitamin with minerals, 1 tablet, Daily  mycophenolate, 180 mg, BID  nystatin, 5 mL, q6h  pantoprazole, 40 mg, BID AC  perflutren lipid microspheres, 0.5-10 mL of dilution, Once in imaging  predniSONE, 10 mg, Daily  [Held by provider] rosuvastatin, 40 mg, Nightly  sennosides-docusate sodium, 1 tablet, BID  [Held by provider] sulfamethoxazole-trimethoprim, 80 mg of trimethoprim, Daily  tacrolimus, 1.5 mg, q24h  tacrolimus, 2 mg, q24h  traZODone, 50 mg, Nightly  valGANciclovir, 450 mg, q48h      PRN:  acetaminophen, 975 mg, q8h PRN  benzocaine-menthol, 1 lozenge, q2h PRN  dextrose, 25 g, q15 min PRN  dextrose, 25 g, q15  "min PRN   Or  glucagon, 1 mg, q15 min PRN  diphenhydrAMINE, 25 mg, q5 min PRN  glucagon, 1 mg, q15 min PRN  guaiFENesin, 600 mg, BID PRN  HYDROmorphone, 0.2 mg, q2h PRN  artificial tears, 2 drop, PRN  microfibrllar collagen, , PRN  mupirocin, , BID PRN  ondansetron, 4 mg, q4h PRN  oxyCODONE, 5 mg, q4h PRN  polyethylene glycol, 17 g, Daily PRN  sodium chloride, 1 spray, 4x daily PRN        Impella:      Most Recent Range Past 24hrs   Performance Level 6 P Level  Min: 6   Min taken time: 03/26/24 0700  Max: 6   Max taken time: 03/26/24 0700   Flow (L/min) 3.3 Flow (L/min)  Min: 3.2   Min taken time: 03/26/24 0600  Max: 3.6   Max taken time: 03/25/24 2000   Motor Current 333/255 Motor Current  Min: 327/245   Min taken time: 03/25/24 1200  Max: 340/251   Max taken time: 03/25/24 2200   Placement Signal Yes  Placement OK could not be evaluated. This SmartLink does not work with rows of the type: Custom List   Purge (mmHg) 552 Purge Pressure (mmHg)  Min: 398   Min taken time: 03/26/24 0500  Max: 614   Max taken time: 03/26/24 0100   Purge rate (mL/hr) 12 Purge Rate (mL/hr)  Min: 11.1   Min taken time: 03/25/24 2300  Max: 12.3   Max taken time: 03/25/24 2100       PHYSICAL EXAM:   Visit Vitals  BP 92/71   Pulse 100   Temp 35.6 °C (96.1 °F) (Oral)   Resp 24   Ht 1.549 m (5' 0.98\")   Wt 96 kg (211 lb 10.3 oz)   SpO2 96%   BMI 40.01 kg/m²   OB Status Hysterectomy   Smoking Status Never   BSA 2.03 m²     Wt Readings from Last 5 Encounters:   03/26/24 96 kg (211 lb 10.3 oz)   12/07/23 92.1 kg (203 lb)   12/01/23 93 kg (205 lb)   11/29/23 92.9 kg (204 lb 12.8 oz)   11/09/23 91.3 kg (201 lb 3.2 oz)     INTAKE/OUTPUT:  I/O last 3 completed shifts:  In: 1516.2 (15.2 mL/kg) [I.V.:1066.2 (10.7 mL/kg); Blood:350; IV Piggyback:100]  Out: 3316 (33.3 mL/kg) [Other:3316]  Dosing Weight: 99.6 kg        Physical Exam  Constitutional:       General: She is not in acute distress.     Appearance: Normal appearance. She is ill-appearing and " toxic-appearing.   HENT:      Head: Normocephalic.      Mouth/Throat:      Mouth: Mucous membranes are moist.   Eyes:      Extraocular Movements: Extraocular movements intact.      Pupils: Pupils are equal, round, and reactive to light.   Neck:      Comments: RIJ dialysis line present - CDI  Cardiovascular:      Rate and Rhythm: Regular rhythm. Tachycardia present.      Pulses: Normal pulses.      Heart sounds: Normal heart sounds.   Pulmonary:      Effort: Pulmonary effort is normal. No respiratory distress.      Breath sounds: Normal breath sounds.   Chest:      Comments: Right axillary impella site CDI   Abdominal:      General: Bowel sounds are normal.      Palpations: Abdomen is soft.      Tenderness: There is no abdominal tenderness.   Musculoskeletal:         General: Normal range of motion.      Cervical back: Normal range of motion.      Right lower leg: Edema present.      Left lower leg: Edema present.   Skin:     General: Skin is warm and dry.      Capillary Refill: Capillary refill takes 2 to 3 seconds.      Coloration: Skin is jaundiced.      Comments: Left groin ECMO cannulation site with drainage    Neurological:      General: No focal deficit present.      Mental Status: She is alert and oriented to person, place, and time.   Psychiatric:         Behavior: Behavior normal. Behavior is cooperative.       DATA:  CMP:  Results from last 7 days   Lab Units 03/25/24 2010 03/25/24  1613 03/25/24  0559 03/24/24  0558 03/23/24 2000 03/23/24  0610 03/22/24  0609 03/21/24  0609 03/20/24  0607   SODIUM mmol/L 132* 132* 133*  133* 131* 132* 134* 129* 134* 130*   POTASSIUM mmol/L 4.5 4.6 4.7  4.7 4.5 4.3 3.7 4.5 4.2 3.8   CHLORIDE mmol/L 98 97* 96*  96* 96* 97* 97* 94* 98 96*   CO2 mmol/L 25 24 22  22 23 23 23 21 23 23   ANION GAP mmol/L 14 16 20  20 17 16 18 19 17 15   BUN mg/dL 23 22 25*  25* 26* 22 12 25* 14 17   CREATININE mg/dL 1.94* 2.02* 2.29*  2.29* 3.01* 2.32* 1.55* 3.07* 1.98* 2.38*   EGFR  mL/min/1.73m*2 35* 33* 28*  28* 20* 28* 45* 20* 34* 27*   MAGNESIUM mg/dL 2.29  --  2.40 2.31 2.21 1.91 2.12 2.11 2.21   ALBUMIN g/dL 3.2* 3.4 3.5 3.3* 3.5 3.4 3.4 3.5 3.4   ALT U/L 256* 262* 275* 220* 198* 157* 86* 59* 52*   AST U/L 498* 532* 566* 534* 505* 408* 192* 102* 83*   BILIRUBIN TOTAL mg/dL 3.7* 4.3* 4.3* 3.1* 2.8* 3.2* 2.8* 1.8* 1.5*       CBC:  Results from last 7 days   Lab Units 03/26/24  0532 03/25/24 2010 03/25/24  0559 03/24/24  0048 03/23/24  0610 03/22/24  0609 03/21/24  0609 03/20/24  0607   WBC AUTO x10*3/uL 6.9 6.5 6.7 8.9 10.1 8.2 7.2 7.6   HEMOGLOBIN g/dL 8.9* 7.2* 8.1* 8.3* 8.2* 8.3* 8.1* 7.7*   HEMATOCRIT % 26.3* 22.0* 25.7* 26.7* 26.3* 25.8* 24.9* 24.5*   PLATELETS AUTO x10*3/uL  --  94* 60* 67* 49* 51* 44* 58*   MCV fL 91 94 99 101* 98 93 94 98       COAG:   Results from last 7 days   Lab Units 03/26/24  0532 03/25/24 2010 03/25/24  0559 03/24/24  0048 03/23/24  0610 03/22/24  0609 03/21/24  0609 03/20/24  0607   INR  2.9* 3.2* 2.8* 2.5* 2.3* 2.0* 1.8* 1.7*       ABO:   ABO TYPE   Date Value Ref Range Status   03/25/2024 O  Final         HEME/ENDO:           CARDIAC:   Results from last 7 days   Lab Units 03/25/24 2010 03/25/24  1613 03/25/24  0559 03/24/24  0558 03/23/24 2000 03/23/24  1306 03/23/24  0610 03/22/24  0609   LD U/L 1,571* 1,691* 1,656* 1,714* 1,786* 1,757* 1,677* 1,126*         ASSESSMENT AND PLAN:   Charla Bowles is a 32 y/o F with PMHx of heart transplant in March 2022 with postoperative course c/b upper extremity/internal jugular DVTs, and 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 She was admitted to HFICU on 2/15 with concern for cardiogenic shock 2/2 allograft rejection and decompensated heart failure with multiorgan dysfunction including significant elevation of liver enzymes and nonoliguric acute kidney injury. Endomyocardial biopsy on 2/16 revealed mild ACR with +CD4s and negative HLAs, however multisystem organ failure persisted with increased inotropic  requirements. IABP placed on 2/18. Transferred to CTICU on 2/19 for [facility] ECMO cannulation with Dr. Marina for persistent multi-organ system dysfunction. Intubated 2/21 for respiratory failure 2/2 pulmonary edema, extubated 2/24. ECMO de-cannulated 2/29/24 but had worsening HIRAM requiring CRRT and overall declined clinical status and IABP was transitioned to R axillary impella 5.5 on 3/1. Transferred from CTICU to HFICU on 3/10 for further management. Continued on milrinone gtt @ 0.25 mcg/kg/min and impella support decreased to P-level 5 on 3/11. Completed PLEX/IVIG on 3/13. Went for RHC 3/13: RA: 16, RV: 43/1, PA: 43/12, PCWP: 23, PA-SAT: 47%, CO: 5.14, CI: 2.64 on P5 of Impella 5.5 and milrinone 0.25 mcg/kg/min. Impella was increased to P6 shortly after, and overnight 3/14 patient's mixed venous dropped from 58->32, CXR was ordered and SGC was in appropriate position. Repeat mixed venous confirmed low value of 37. Impella increased to P8 and stat ECHO ordered 3/14. WBC continued to down trend 3/14 which prompted new infectious workup to be sent, and patient to be started on broad spectrum abx. Patient transitioned to tablo from iHD for better volume removal given elevated CVPs 3/14. 03/15: Malpositioned Impella adjusted at the bedside under echo by Dr. Marina and Dr. Melo. Impella pulled back ~ 1 - 2 cm and confirmed placement, pushed back in 1 cm in the evening by Dr Lewis for placement alarms. Hampton removed 3/16 and P level decreased to 6 due to continued high hemolysis. 3/17: LDH has plateaued @ 2111. CT scan chest, abd, pelvis complete w/o acute ABD. 3/18: Impella purge solution transitioned from sodium bicarb to heparin solution. 3/19: Milrinone gtt discontinued and transitioned to Epinephrine gtt to improve blood pressures. Initiated Myfortic low dose BID, discontinued Sirolimus, increased Tacrolimus to 2 mg BID (goal: 8 - 10). Received dialysis with SLED 3/19 and 3/20. Trialed diuretic challenge 3/21 which was  unsuccessful. P level increased to 7 on 3/21 given lactate. 3/23: P level decreased to P6 given labs were concerning for hemolysis. Afternoon dose of myfortic also held given patients abdominal discomfort. Tacrolimus decreased to 2 mg at 1830. 3/24 myfortic was restarted at 180 mg BID. 03/25: Impella repositioned 3/25 by Dr. Caruso under Echo guided.  Post re-position, lactate down, Dobutamine initiated @ 2.5 mcg/kg/min, and increased to 5 mcg/kg/min for cardiac support.  Per IR attending (3/26) :Based on the patients access options it appears that even we would have to do a femoral line so for this sick patient makes more sense to just do all of these procedures bedside.  Cancelled IR HD line placement, Dr. Toussaint aware.     NEURO:   #Acute Pain   #Insomnia  - C/w tylenol 975 mg q8 for mild pain   - C/w oxycodone 5 mg q4 PRN for moderate pain   - D/c'd dilaudid 0.2 mg q2 PRN per palliative  - C/w lidocaine patch for lower back pain   - C/w melatonin 10 mg nightly   - C/w trazodone 50 mg nightly for insomnia  - PT/OT   - CAM ICU score q shift  - Sleep/wake cycle hygiene  - Serial neuro and pain assessments     ENT:  #Epistaxis (resolved)  - Significant epistaxis 2/28 and 3/3 requiring ENT consult, packing removed by ENT 3/5  - S/p ocean spray 5x day x10 days completed   - Ocean spray and mupiricin PRN for dry nasal passages     CARDIAC:  #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)  Toxo: -/-   Hep C: -/-     Rejection/Prophylaxis (transplant):  - Steroids: prednisone 10 mg daily   - Tacrolimus 1.0 mg BID  w/ daily levels drawn @ 0600   - Sirolimus discontinued 3/19   - Continue Myfortic 180 mg BID given abdominal pain per patient (started 3/19, increased dose 3/21 and patient was experiencing worsening abdominal pain)  - Tacrolimus goal troughs: 8-10, daily level (3/26): 15 (12.3)  - Antifungals: nystatin oral suspension 5 mls q6  -  Antivirals: valcyte 450 mg q 48 due to low grade viremia   - Holding anti PCP & toxoplasmosis: Bactrim SS daily 2/2 thrombocytopenia (2/23)     Last cardiac biopsy: 2/16/24 with ACR1 and no AMR  Last HLA (2/16/24): negative for DSAs   Last RHC (3/13/24): RA: 16, RV: 43/1, PA: 43/12, PCWP: 23, PA-SAT: 47%, CO: 5.14, CI: 2.64 on P5 of Impella 5.5 and milrinone 0.25 mcg/kg/min   Last LHC (2/18/24): negative for CAV and CAD   Last TTE/BOB (3/22/24): LVEF 15-20%, moderately enlarged RV, severely reduced RV systolic function, impella inflow cannula is visualized in the LV apex, impella tip 4.3 cm from aortic annulus   Osteopenia/osteoporosis prophylaxis: Vitamin D3 and calcium supplements  Peptic/gastric ulcer prophylaxis: Pantoprazole 40 mg daily   CAV Prophylaxis: Holding aspirin 81 mg daily 2/2 thrombocytopenia & holding rosuvastatin 40 mg nightly 2/2 transaminitis     - Unclear cause of acute severe graft dysfunction. Most likely smoldering ACR vs. AMR; however, 2x allograft biopsies on 2/16 & 2/20 remain negative for any significant pathology. Notably, both biopsies taken after rejection therapies implemented which may have reduced areas of graft damage.    - DSAs remain negative; however, patient may have non-HLA antibodies present. 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   - Negative CAV & CAD via LHC on 2/18/24   - Completed methylpred steroid pulse w/ 1g q24 x 3 days (2/16-2/18)  - Thymoglobulin doses: 2/18 & 2/19   - IVIG + PLEX Sessions completed: 2/18, 2/20, 3/7, 3/11, 3/13 (completed)  - Graft function slightly worse after transition to impella 5.5 on 3/1. 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  #Impella 5.5 support (3/1/24)  RHC (3/13/24): RA: 16, RV: 43/1, PA: 43/12, PCWP: 23, PA-SAT: 47%, CO: 5.14, CI: 2.64 on P5 of Impella 5.5, milrinone 0.25 mcg/kg/min,  hydralazine 100 mg q8, isordil 40 mg q8  Opening SGC #s (3/13): BP 94/71 (79), PAP 42/30 (35), CVP 13, , CO/CI (kyra) 5.46/2.80, SVO2 56% on P5 of Impella 5.5, milrinone 0.25 mcg/kg/min, hydralazine 100 mg q8, isordil 40 mg q8  Closing SGC #s (3/16): /86 (97), CVP 20, PAP 35/25 (42), SVR 1115, CO/CI (kyra) 5.5/2.8, SVO2 51% on P7 of Impella 5.5, milrinone 0.25 mcg/kg/min, hydralazine 50 mg q8, isordil 20 mg q8  Milrinone discontinued 3/19  - Maintain goal MAP 70-90  - C/w Impella to P6 (last weaned from P7 3/23 2/2 labs more concerning for hemolysis)  -  Impella repositioned 3/25 by Dr. Caruso under Echo guided.  Post re-position, lactate down  - Transitioned to heparin purge for Impella per Dr. Wright (03/19) -> monitor ACT daily AM  - C/w epinephrine gtt for improved blood pressure to increase fluid removal (3/19)  - Trending lactate, LDH   - Volume removal with dialysis    #Advanced therapy evaluation  - Presented to committee 3/12/2024 - concern for end organ failure (possible heart / kidney)   - Not a candidate for transplant at this time per committee discussion 3/12/24  - Discussed in selection committee meeting 03/19 -   (1) likelihood of cardiac recovery s/p graft failure seems less likely as time goes by  (2) she may be a candidate for re-do cardiac transplantation, but she has high risk features (kidney failure, congestion, prior failure of immunosuppression)  (3) per UNOS guidelines she would not be a candidate for combined H/K listing until 6 weeks have passed since kidney failure episode started.      PULM:   #Acute Hypoxic Respiratory Failure 2/2 pulmonary edema (resolved)  ETT (2/21-2/24)  Remains on RA   - Maintain SpO2 > 92%     GI:   #Abdominal Pain, thought to be 2/2 myfortic dose increase (resolved)  #Nausea  #Emesis (dark blood - clots) (resolved)  Repeat CT chest/abd/pelvis w/o contrast (3/17): no acute abdomen  Patient had emesis x1 with dark blood in vomit -> GI consult  placed 3/17 - signed off (protonix gtt for 3 days, d/tri on 3/20)  C diff (3/17): negative  - C/w zofran 4 mg q4 PRN for nausea  - Bowel regimen: Patti-Colace BID + miralax daily PRN   - PPI BID per GI   - Follow up stool samples: H pylori, fecal calprotectin, fecal lactoferrin   - GI following, appreciate assistance     #Hx of gastric bypass   #Hx of MMF colitis  #Acute transaminitis   - Continue PPI, calcitriol 0.5 mg daily, multivitamin  - Trend LFTs daily      :   #Acute Renal Failure 2/2 to cardiorenal syndrome (on dialysis), anuric   Baseline Cr 1.2-1.3  S/p diuretic challenge 3/21 without response   - RFP daily and PRN  - Continue for CVVHD 3/25  - Will need tunneled dialysis line - holding until more stable - will reach out to IR 03/26  - Transplant nephrology following closely, appreciate assistance     ENDO:   #T2DM  Steroid induced hyperglycemia acceptable glycemic control on SSI  - Maintain BG <180 with hypoglycemia protocol  - C/w SSI     #Hypothyroidism  TSH (3/17): 20.74, T4 1.11, T3: 1.4    - C/w synthroid 200 mcg daily      HEME:   #Acute DVT LIJ and SVT left cephalic vein and right cephalic vein   #Iron deficiency anemia  #Acute blood loss anemia   #Multiple transfusions   #Hemolysis   UE and LE Venous duplex (3/6/24): right acute occlusive superficial venous thrombosis visualized in the mid cephalic and acute occlusive superficial venous thrombosis visualized in the distal cephalic veins, left acute non-occlusive deep vein thrombosis visualized in the internal jugular and acute non-occlusive superficial venous thrombosis visualized in the mid cephalic veins   UE and LE Venous duplex (3/12): unchanged from prior  Last type and screen: 3/15  - 3/15: Transfused 1 unit leukocyte reduced PRBC's   - 3/16: Transfused 1 unit leukocyte reduced PRBC's   - Heparin impella purge check ACT Q daily   - ASA on hold d/t low platelet count   - Continue SCDs for DVT prophylaxis  - Continue Aranesp every 2 weeks  -  Vascular medicine signed off 3/13, will need discussion regarding long term anticoagulation closer to discharge   - Consulted hematology regarding DIC / ALBERT: unlikely per hematology (3/17)  - Per hematology, trend daily T/D-bilirubin, LDH, haptoglobin, reticulocyte count, fibrinogen, PT/PTT/INR, D-dimer  - [id number] sent per heme recs: 43- the tacrolimus level has been adjusted recently and there may be a tacro-induced TMA.     ID:   #Leukopenia, likely in the setting of IVIG vs infectious process (resolved)  Blood cultures (2/15): NGTD  Wound culture (3/15): NGTD  S/p vancomycin + zosyn (3/14-3/17)  S/p diflucan 200 mg x1 (3/21) given concern for yeast infection   Afebrile, nontoxic  - Left groin ECMO cannulation site - wound no longer draining - wound care evaluated - No growth from culture   - Trend temp q4h     PHYSICAL AND OCCUPATIONAL THERAPY: ordered and following     LINES:  PIVs  RIJ trialysis catheter 3/5 (discussed line in rounds this AM as patient has no other access)   R Axillary Impella 3/1    Per IR attending (3/26) :     Based on the patients access options it appears that even we would have to do a femoral line so for this sick patient makes more sense to just do all of these procedures bedside.       DVT: SCDs  VAP BUNDLE: N/A  ULCER PPX: PPI  GLYCEMIC CONTROL: SSI  BOWEL CARE: Patti-colace, miralax    INDWELLING CATHETER: N/A  NUTRITION: NPO Diet; Effective midnight      EMERGENCY CONTACT: Extended Emergency Contact Information  Primary Emergency Contact: SAHIL DIMAS  Address: [address] [place] [address] of [facility]  Home Phone: [phone]  Mobile Phone: [phone]  Relation: Mother  FAMILY UPDATE: Updated daily   CODE STATUS: Full Code  DISPO: Remain in HFICU    Patient seen and assessed with Dr. Toussaint  _________________________________________________  JIMMY Rg-CNP

## 2024-03-26 NOTE — PROGRESS NOTES
Physical Therapy    Physical Therapy Treatment    Patient Name: Charla Bowles  MRN: [id number]  Today's Date: 3/26/2024  Time Calculation  Start Time: 1001  Stop Time: 1056  Time Calculation (min): 55 min       Assessment/Plan   PT Assessment  End of Session Communication: Bedside nurse  Assessment Comment: pt not able to walk during today's session d/t mobility being restricted by CVVH.  End of Session Patient Position: Up in chair, Alarm off, not on at start of session  PT Plan  Inpatient/Swing Bed or Outpatient: Inpatient  PT Plan  Treatment/Interventions: Bed mobility, Transfer training, Gait training, Stair training, Balance training, Strengthening, Endurance training, Therapeutic exercise, Therapeutic activity  PT Plan: Skilled PT  PT Frequency: 5 times per week  PT Discharge Recommendations: High intensity level of continued care  PT Recommended Transfer Status: Assist x1  PT - OK to Discharge: Yes      General Visit Information:   PT  Visit  PT Received On: 03/26/24  Response to Previous Treatment: Patient with no complaints from previous session.  General  Prior to Session Communication: Bedside nurse  Patient Position Received: Bed, 3 rail up, Alarm off, not on at start of session  General Comment: pt supine in bed and agreeable to work with PT today. she noted that she had not been up and moving since we last saw her. she mentioned pain in her mid-low back prior to session. lines and tubes: CVVH, impella (PRE: P-6, Flow: 3.4, POST: P-6, Flow: 3.4), tele. IV meds: EPI (0.02), dobutamine (5 mcg).    Subjective   Precautions:  Precautions  Medical Precautions: Cardiac precautions, Fall precautions  Precautions Comment: R axillary impella precautions- no pushing/pulling with R UE, No lifting R UE above shoulder height, no R UE humeral EXT past plane of body.    Vital Signs:  Vital Signs  Heart Rate:  (pre: 102, during: max = 106, post: 105)  Resp:  (pre: 21, post: 24)  SpO2:  (pre: 100%, during: low =  95%, post: 98%)  BP:  (pre: 100/87, post: 117/91)  BP Location: Left arm  BP Method: Automatic  Patient Position: Lying (second measurement in sitting.)    Objective   Pain:  Pain Assessment  Pain Assessment:  (pt reported mid-low back pain, but would not give numeric rating.)    Cognition:  Cognition  Overall Cognitive Status: Within Functional Limits  Arousal/Alertness: Appropriate responses to stimuli  Orientation Level: Oriented X4    Activity Tolerance:  Activity Tolerance  Endurance: Tolerates 10 - 20 min exercise with multiple rests  Early Mobility/Exercise Safety Screen: Proceed with mobilization - No exclusion criteria met  Activity Tolerance Comments: pt able to tolerate 2 sets of LE exercise and 5 mins on peddler.    Treatments:  Therapeutic Exercise  Therapeutic Exercise Performed: Yes (pt mentioned during activity that it felt good to get up and moving.)  Therapeutic Exercise Activity 1: seated LAQ (2x10, 2 lbs)  Therapeutic Exercise Activity 2: seated hip flexion (2x10 (2 lbs))  Therapeutic Exercise Activity 3: seated peddler (5 mins)    Bed Mobility  Bed Mobility: Yes  Bed Mobility 1  Bed Mobility 1: Supine to sitting  Level of Assistance 1: Minimal verbal cues, Mod A x 1    Transfers  Transfer: Yes  Transfer 1  Transfer From 1: Sit to  Transfer to 1: Stand  Technique 1: Sit to stand  Transfer Level of Assistance 1: Arm in arm assistance, +1 to manage equipment (nurse managed lines while PT and SPT provided arm in arm assist with most support coming from glut area and (B) knees blocked.) Mod A x 2.     Transfers 2  Transfer From 2: Stand to  Transfer to 2: Sit  Technique 2: Stand to sit  Transfer Level of Assistance 2:  Mod A x 2, +1 to manage equipment (nurse managed lines while PT and SPT provided CGA. Pt completed 2 side-steps to get into position to sit down.  Pt used L UE to guide her way down to the seat.).     Bed>Chair transfer, with Mod A x 2, cues for safety and hand placement. Able to take  2 side steps plus turn to get into chair.     Outcome Measures:  [facility] Basic Mobility  Turning from your back to your side while in a flat bed without using bedrails: None  Moving from lying on your back to sitting on the side of a flat bed without using bedrails: A little  Moving to and from bed to chair (including a wheelchair): A lot  Standing up from a chair using your arms (e.g. wheelchair or bedside chair): A lot  To walk in hospital room: A little  Climbing 3-5 steps with railing: A lot  Basic Mobility - Total Score: 16    FSS-ICU  Ambulation: Unable to attempt due to weakness (unable to attempt d/t CVVH)  Rolling: Supervision or set-up only  Sitting: Supervision or set-up only  Transfer Sit-to-Stand: Moderate assistance (performs 50 - 74% of task)  Transfer Supine-to-Sit: Minimal assistance (performs 75% or more of task)  Total Score: 17    ICU Mobility Screen  Early Mobility/Exercise Safety Screen: Proceed with mobilization - No exclusion criteria met  E = Exercise and Early Mobility  Early Mobility/Exercise Safety Screen: Proceed with mobilization - No exclusion criteria met  Current Activity: Sitting in chair    Education Documentation  Precautions, taught by MEE Rivera at 3/26/2024 12:41 PM.  Learner: Patient  Readiness: Acceptance  Method: Explanation  Response: Demonstrated Understanding  Comment: exercise to be completed today. pt educated to not push or pull with R UE.    Mobility Training, taught by MEE Rivera at 3/26/2024 12:41 PM.  Learner: Patient  Readiness: Acceptance  Method: Explanation  Response: Demonstrated Understanding  Comment: exercise to be completed today. pt educated to not push or pull with R UE.    Education Comments  No comments found.        Encounter Problems       Encounter Problems (Active)       Balance       Pt will demonstrate improved sitting/standing static/dynamic balance activities without LOB for increase in safety prior to DC.  (Progressing)        Start:  03/01/24    Expected End:  04/01/24               Mobility       Pt will ambulate 200ft with appropriate form, CGA or less, LRAD, and no LOB for safe DC.  (Met)       Start:  03/01/24    Expected End:  03/15/24    Resolved:  03/18/24    Updated to: Pt will ambulate >500 ft with LRAD and supervision with RPD </= 3/10 and RPE </= 13/20.    Update reason: Goal Met         Pt will tolerate >15 minutes of upright standing activity without seated rest breaks with no changes in vital signs for improved functional mobility.  (Progressing)       Start:  03/01/24    Expected End:  04/01/24            Pt will ascend/descend 3 steps with HR with CGA or less in order to safely access her home upon DC.  (Progressing)       Start:  03/01/24    Expected End:  04/01/24            Pt will ambulate >500 ft with LRAD and supervision with RPD </= 3/10 and RPE </= 13/20. (Progressing)       Start:  03/18/24    Expected End:  04/01/24                   Transfers       Pt will perform bed mobility and sit<>stand transfers with CGA or less and use of LRAD to safety DC.  (Progressing)       Start:  03/01/24    Expected End:  04/01/24

## 2024-03-26 NOTE — PROGRESS NOTES
Occupational Therapy                 Therapy Communication Note    Patient Name: Charla Bowles  MRN: [id number]  Today's Date: 3/26/2024     Discipline: Occupational Therapy    Missed Visit Reason: Missed Visit Reason:  (PT in working with patient; will attempt OT next visit as appropriate.)    Missed Time: Attempt    Comment:  July Araujo OTR/L  Inpatient Occupational Therapist   Rehab Office: [phone]

## 2024-03-26 NOTE — PROGRESS NOTES
"        INPATIENT TRANSPLANT NEPHROLOGY PROGRESS NOTE          REASON FOR CONSULT:  Immunosuppressive medication management and nephrology related issues.    SUBJECTION:     Tolerated CVVH well  Discussed with heart failure team   No acute event overnight.    PHYCISCAL EXAMINATION:    Visit Vitals  BP (!) 158/115   Pulse 103   Temp 35.8 °C (96.4 °F) (Temporal)   Resp 20   Ht 1.549 m (5' 0.98\")   Wt 96 kg (211 lb 10.3 oz)   SpO2 91%   BMI 40.01 kg/m²   OB Status Hysterectomy   Smoking Status Never   BSA 2.03 m²        03/24 1900 - 03/26 0659  In: 1516.2 [I.V.:1066.2]  Out: 3316      Weight change: -0.9 kg (-1 lb 15.7 oz)    Constitutional:       General: She is not in acute distress.     Appearance: Normal appearance. She is ill-appearing and toxic-appearing.   HENT:      Head: Normocephalic.      Mouth/Throat:      Mouth: Mucous membranes are moist.   Eyes:      Extraocular Movements: Extraocular movements intact.      Pupils: Pupils are equal, round, and reactive to light.   Neck:      Comments: RIJ dialysis line present - CDI  Cardiovascular:      Rate and Rhythm: Regular rhythm. Tachycardia present.      Pulses: Normal pulses.      Heart sounds: Normal heart sounds.   Pulmonary:      Effort: Pulmonary effort is normal. No respiratory distress.      Breath sounds: Normal breath sounds.   Chest:      Comments: Right axillary impella site CDI   Abdominal:      General: Bowel sounds are normal.      Palpations: Abdomen is soft.      Tenderness: There is no abdominal tenderness.   Musculoskeletal:         General: Normal range of motion.      Cervical back: Normal range of motion.      Right lower leg: Edema present.      Left lower leg: Edema present.   Skin:     General: Skin is warm and dry.      Capillary Refill: Capillary refill takes 2 to 3 seconds.      Coloration: Skin is jaundiced.      Comments: Left groin ECMO cannulation site with drainage    Neurological:      General: No focal deficit present.      " Mental Status: She is alert and oriented to person, place, and time.   Psychiatric:         Behavior: Behavior normal. Behavior is cooperative.     MEDICATION LIST: REVIEWED    [Held by provider] aspirin, 81 mg, Daily  calcitriol, 0.5 mcg, Daily  darbepoetin floyd, 100 mcg, q14 days  ferrous sulfate (325 mg ferrous sulfate), 65 mg of iron, Daily with breakfast  folic acid, 1 mg, Daily  insulin lispro, 0-10 Units, Before meals & nightly  levothyroxine, 200 mcg, Daily  melatonin, 10 mg, Nightly  multivitamin with minerals, 1 tablet, Daily  mycophenolate, 180 mg, BID  nystatin, 5 mL, q6h  pantoprazole, 40 mg, BID AC  perflutren lipid microspheres, 0.5-10 mL of dilution, Once in imaging  predniSONE, 10 mg, Daily  [Held by provider] rosuvastatin, 40 mg, Nightly  sennosides-docusate sodium, 1 tablet, BID  [Held by provider] sulfamethoxazole-trimethoprim, 80 mg of trimethoprim, Daily  tacrolimus, 1.5 mg, q24h  tacrolimus, 2 mg, q24h  traZODone, 50 mg, Nightly  valGANciclovir, 450 mg, q48h      DOBUTamine, Last Rate: 5 mcg/kg/min (03/26/24 0409)  EPINEPHrine, Last Rate: 0.02 mcg/kg/min (03/26/24 0409)  heparin Impella Purge 25 units/mL in 500 mL D5W, Last Rate: 10 mL/hr (03/26/24 0409)  PrismaSol 4/2.5, Last Rate: 25 mL/kg/hr (03/25/24 1351)      acetaminophen, 975 mg, q8h PRN  benzocaine-menthol, 1 lozenge, q2h PRN  dextrose, 25 g, q15 min PRN  dextrose, 25 g, q15 min PRN   Or  glucagon, 1 mg, q15 min PRN  diphenhydrAMINE, 25 mg, q5 min PRN  glucagon, 1 mg, q15 min PRN  guaiFENesin, 600 mg, BID PRN  HYDROmorphone, 0.2 mg, q2h PRN  artificial tears, 2 drop, PRN  microfibrllar collagen, , PRN  mupirocin, , BID PRN  ondansetron, 4 mg, q4h PRN  oxyCODONE, 5 mg, q4h PRN  polyethylene glycol, 17 g, Daily PRN  sodium chloride, 1 spray, 4x daily PRN        ALLERGY:  Allergies   Allergen Reactions    Dapagliflozin GI bleeding and Bleeding     UTI    Urinary tract infection    Empagliflozin Unknown    Myfortic [Mycophenolate Sodium]  GI Upset    Topiramate Nausea Only and Nausea/vomiting    Latex Rash       LABS:  Results for orders placed or performed during the hospital encounter of 02/15/24 (from the past 24 hour(s))   POCT GLUCOSE   Result Value Ref Range    POCT Glucose 180 (H) 74 - 99 mg/dL   BUN   Result Value Ref Range    Urea Nitrogen 22 6 - 23 mg/dL   Creatinine, Serum   Result Value Ref Range    Creatinine 2.02 (H) 0.50 - 1.05 mg/dL    eGFR 33 (L) >60 mL/min/1.73m*2   Electrolyte panel   Result Value Ref Range    Sodium 132 (L) 136 - 145 mmol/L    Potassium 4.6 3.5 - 5.3 mmol/L    Chloride 97 (L) 98 - 107 mmol/L    Bicarbonate 24 21 - 32 mmol/L    Anion Gap 16 10 - 20 mmol/L   Calcium, ionized   Result Value Ref Range    POCT Calcium, Ionized 1.02 (L) 1.1 - 1.33 mmol/L   Phosphorus   Result Value Ref Range    Phosphorus 3.5 2.5 - 4.9 mg/dL   Lactate   Result Value Ref Range    Lactate 2.7 (H) 0.4 - 2.0 mmol/L   Hepatic function panel   Result Value Ref Range    Albumin 3.4 3.4 - 5.0 g/dL    Bilirubin, Total 4.3 (H) 0.0 - 1.2 mg/dL    Bilirubin, Direct 2.6 (H) 0.0 - 0.3 mg/dL    Alkaline Phosphatase 294 (H) 33 - 110 U/L     (H) 7 - 45 U/L     (H) 9 - 39 U/L    Total Protein 6.2 (L) 6.4 - 8.2 g/dL   Lactate Dehydrogenase   Result Value Ref Range    LDH 1,691 (H) 84 - 246 U/L   Haptoglobin   Result Value Ref Range    Haptoglobin <10 (L) 37 - 246 mg/dL   POCT GLUCOSE   Result Value Ref Range    POCT Glucose 175 (H) 74 - 99 mg/dL   Lactate   Result Value Ref Range    Lactate 2.0 0.4 - 2.0 mmol/L   CBC and Auto Differential   Result Value Ref Range    WBC 6.5 4.4 - 11.3 x10*3/uL    nRBC 10.7 (H) 0.0 - 0.0 /100 WBCs    RBC 2.34 (L) 4.00 - 5.20 x10*6/uL    Hemoglobin 7.2 (L) 12.0 - 16.0 g/dL    Hematocrit 22.0 (L) 36.0 - 46.0 %    MCV 94 80 - 100 fL    MCH 30.8 26.0 - 34.0 pg    MCHC 32.7 32.0 - 36.0 g/dL    RDW 24.7 (H) 11.5 - 14.5 %    Platelets 94 (L) 150 - 450 x10*3/uL    Neutrophils % 83.9 40.0 - 80.0 %    Immature  Granulocytes %, Automated 1.4 (H) 0.0 - 0.9 %    Lymphocytes % 6.2 13.0 - 44.0 %    Monocytes % 8.5 2.0 - 10.0 %    Eosinophils % 0.0 0.0 - 6.0 %    Basophils % 0.0 0.0 - 2.0 %    Neutrophils Absolute 5.42 1.20 - 7.70 x10*3/uL    Immature Granulocytes Absolute, Automated 0.09 0.00 - 0.70 x10*3/uL    Lymphocytes Absolute 0.40 (L) 1.20 - 4.80 x10*3/uL    Monocytes Absolute 0.55 0.10 - 1.00 x10*3/uL    Eosinophils Absolute 0.00 0.00 - 0.70 x10*3/uL    Basophils Absolute 0.00 0.00 - 0.10 x10*3/uL   Comprehensive metabolic panel   Result Value Ref Range    Glucose 136 (H) 74 - 99 mg/dL    Sodium 132 (L) 136 - 145 mmol/L    Potassium 4.5 3.5 - 5.3 mmol/L    Chloride 98 98 - 107 mmol/L    Bicarbonate 25 21 - 32 mmol/L    Anion Gap 14 10 - 20 mmol/L    Urea Nitrogen 23 6 - 23 mg/dL    Creatinine 1.94 (H) 0.50 - 1.05 mg/dL    eGFR 35 (L) >60 mL/min/1.73m*2    Calcium 7.4 (L) 8.6 - 10.6 mg/dL    Albumin 3.2 (L) 3.4 - 5.0 g/dL    Alkaline Phosphatase 275 (H) 33 - 110 U/L    Total Protein 5.8 (L) 6.4 - 8.2 g/dL     (H) 9 - 39 U/L    Bilirubin, Total 3.7 (H) 0.0 - 1.2 mg/dL     (H) 7 - 45 U/L   Magnesium   Result Value Ref Range    Magnesium 2.29 1.60 - 2.40 mg/dL   Lactate Dehydrogenase   Result Value Ref Range    LDH 1,571 (H) 84 - 246 U/L   Protime-INR   Result Value Ref Range    Protime 37.0 (H) 9.8 - 12.8 seconds    INR 3.2 (H) 0.9 - 1.1   Prepare RBC: 1 Units, Leukocytes Reduced (CMV reduced risk)   Result Value Ref Range    PRODUCT CODE T7102P93     Unit Number [id number]     Unit ABO O     Unit RH POS     XM INTEP COMP     Dispense Status TR     Blood Expiration Date April 11, 2024 23:59 EDT     PRODUCT BLOOD TYPE 5100     UNIT VOLUME 323    Type and screen   Result Value Ref Range    ABO TYPE O     Rh TYPE POS     ANTIBODY SCREEN NEG    Tacrolimus level   Result Value Ref Range    Tacrolimus  15.0 <=15.0 ng/mL   Coagulation Screen   Result Value Ref Range    Protime 33.3 (H) 9.8 - 12.8 seconds    INR  2.9 (H) 0.9 - 1.1    aPTT 43 (H) 27 - 38 seconds   CBC and Auto Differential   Result Value Ref Range    WBC 6.9 4.4 - 11.3 x10*3/uL    nRBC 16.9 (H) 0.0 - 0.0 /100 WBCs    RBC 2.90 (L) 4.00 - 5.20 x10*6/uL    Hemoglobin 8.9 (L) 12.0 - 16.0 g/dL    Hematocrit 26.3 (L) 36.0 - 46.0 %    MCV 91 80 - 100 fL    MCH 30.7 26.0 - 34.0 pg    MCHC 33.8 32.0 - 36.0 g/dL    RDW 23.9 (H) 11.5 - 14.5 %    Platelets 60 (L) 150 - 450 x10*3/uL    Immature Granulocytes %, Automated 1.4 (H) 0.0 - 0.9 %    Immature Granulocytes Absolute, Automated 0.10 0.00 - 0.70 x10*3/uL   Calcium, Ionized   Result Value Ref Range    POCT Calcium, Ionized 1.00 (L) 1.1 - 1.33 mmol/L   D-dimer, Non VTE   Result Value Ref Range    D-Dimer Non VTE, Quant (ng/mL FEU) 3,948 (H) <=500 ng/mL FEU   Comprehensive metabolic panel   Result Value Ref Range    Glucose 118 (H) 74 - 99 mg/dL    Sodium 133 (L) 136 - 145 mmol/L    Potassium 5.1 3.5 - 5.3 mmol/L    Chloride 98 98 - 107 mmol/L    Bicarbonate 22 21 - 32 mmol/L    Anion Gap 18 10 - 20 mmol/L    Urea Nitrogen 18 6 - 23 mg/dL    Creatinine 1.63 (H) 0.50 - 1.05 mg/dL    eGFR 43 (L) >60 mL/min/1.73m*2    Calcium 7.9 (L) 8.6 - 10.6 mg/dL    Albumin 3.4 3.4 - 5.0 g/dL    Alkaline Phosphatase 270 (H) 33 - 110 U/L    Total Protein 6.1 (L) 6.4 - 8.2 g/dL     (H) 9 - 39 U/L    Bilirubin, Total 3.7 (H) 0.0 - 1.2 mg/dL     (H) 7 - 45 U/L   Fibrinogen   Result Value Ref Range    Fibrinogen 169 (L) 200 - 400 mg/dL   Lactate Dehydrogenase   Result Value Ref Range    LDH 1,548 (H) 84 - 246 U/L   Magnesium   Result Value Ref Range    Magnesium 2.53 (H) 1.60 - 2.40 mg/dL   Phosphorus   Result Value Ref Range    Phosphorus 2.8 2.5 - 4.9 mg/dL   Reticulocytes   Result Value Ref Range    Retic % 8.2 (H) 0.5 - 2.0 %    Retic Absolute 0.238 (H) 0.018 - 0.083 x10*6/uL    Reticulocyte Hemoglobin 35 28 - 38 pg    Immature Retic fraction 40.1 (H) <=16.0 %   Lactate   Result Value Ref Range    Lactate 2.2 (H) 0.4 - 2.0  mmol/L   Bilirubin, Direct   Result Value Ref Range    Bilirubin, Direct 2.1 (H) 0.0 - 0.3 mg/dL   Manual Differential   Result Value Ref Range    Neutrophils %, Manual 93.9 40.0 - 80.0 %    Lymphocytes %, Manual 1.7 13.0 - 44.0 %    Monocytes %, Manual 4.4 2.0 - 10.0 %    Eosinophils %, Manual 0.0 0.0 - 6.0 %    Basophils %, Manual 0.0 0.0 - 2.0 %    Seg Neutrophils Absolute, Manual 6.48 1.20 - 7.00 x10*3/uL    Lymphocytes Absolute, Manual 0.12 (L) 1.20 - 4.80 x10*3/uL    Monocytes Absolute, Manual 0.30 0.10 - 1.00 x10*3/uL    Eosinophils Absolute, Manual 0.00 0.00 - 0.70 x10*3/uL    Basophils Absolute, Manual 0.00 0.00 - 0.10 x10*3/uL    Total Cells Counted 114     RBC Morphology See Below     Polychromasia Mild     Hypochromia Mild     RBC Fragments Few     [place] Cells Many     Acanthocytes Few    POCT GLUCOSE   Result Value Ref Range    POCT Glucose 147 (H) 74 - 99 mg/dL   POCT GLUCOSE   Result Value Ref Range    POCT Glucose 146 (H) 74 - 99 mg/dL     *Note: Due to a large number of results and/or encounters for the requested time period, some results have not been displayed. A complete set of results can be found in Results Review.        ASSESSMENT AND PLAN:    Ms. Yimi Bowles is a 34 y.o. female who underwent a kidney transplant surgery  on 3/31/2022 (Heart).    Principal Problem:    Cardiogenic shock (CMS/[facility])  Active Problems:    Heart transplant recipient (CMS/[facility])    ESRD (end stage renal disease) on dialysis (CMS/[facility])    HIRAM (acute kidney injury) (CMS/[facility])    Acute passive congestion of liver    Hyponatremia    Iron deficiency anemia    Abdominal pain    Cardiac transplant rejection (CMS/[facility])    Acute combined systolic (congestive) and diastolic (congestive) heart failure (CMS/[facility])      CRRT Management Note:    - Patient was seen and examined while on CVVH   - Lab was reviewed  - CVVH order was reviewed  -Discussed with primary team  -Discussed with RN   - Will continue CVVH for now  -Daily evaluation for  indications for CVVH and will convert it to regular IHD once the patient is more hemodynamically stable.      * Case was discussed with primary team.  For questions, please contact transplant nephrology page x [phone]    Russ Bergeron    Transplant Nephrologist

## 2024-03-26 NOTE — PROGRESS NOTES
HFICU Attending Note     Principal Problem:    Cardiogenic shock (CMS/[facility])  Active Problems:    Heart transplant recipient (CMS/[facility])    ESRD (end stage renal disease) on dialysis (CMS/[facility])    HIRAM (acute kidney injury) (CMS/[facility])    Acute passive congestion of liver    Hyponatremia    Iron deficiency anemia    Abdominal pain    Cardiac transplant rejection (CMS/[facility])    Acute combined systolic (congestive) and diastolic (congestive) heart failure (CMS/HCC)     34-year-old woman status post OHT with chronic failure (biventricular failure).  She was reinitiated on Myfortic low-dose 3/24/2024, and fortunately continues to tolerate this.  She continues to need RRT, and is approaching appropriateness for considering renal transplantation.     3/25/2024 she was fatigued with rising lactate and LDH.  TTE at bedside appears to showed Impella in suboptimal position and this was repositioned under echocardiographic guidance 3/25/2024.  In the interim, dobutamine was initiated and escalated to 5.     Today down trend in LDH and lactate normalized. Cont current Impella support    Concern re degree of opiate use for back pain. Encouraged to stand more and get OOB with PT and Rn team.  Also opiate doses de-escalated with Pall med assistance.     This critically ill patient continues to be at-risk for clinically significant deterioration / failure due to the above mentioned dysfunctional, unstable organ systems.  I have personally identified and managed all complex critical care issues to prevent aforementioned clinical deterioration.  Critical care time is spent at bedside and/or the immediate area and has included, but is not limited to, the review of diagnostic tests, labs, radiographs, serial assessments of hemodynamics, respiratory status, ventilatory management, and family updates.  Time spent in procedures and teaching are reported separately.     Critical care time: 45 minutes

## 2024-03-27 PROBLEM — I50.20 SYSTOLIC CONGESTIVE HEART FAILURE (MULTI): Status: ACTIVE | Noted: 2024-01-01

## 2024-03-27 NOTE — ANESTHESIA PREPROCEDURE EVALUATION
Patient: Charla Bowles    Procedure Information       Date/Time: 03/27/24 1555    Procedure: Placement Extracorporeal Membrane Oxygenation    Location: [facility] OR 21 / [facility] OR    Surgeons: Ruben Booker MD            Relevant Problems   Anesthesia (within normal limits)      Cardiac   (+) Essential hypertension   (+) Systolic congestive heart failure (CMS/HCC)      Pulmonary   (+) Shortness of breath on exertion      Neuro   (+) Anxiety   (+) Carpal tunnel syndrome   (+) Depression with anxiety      GI   (+) GERD (gastroesophageal reflux disease)      /Renal   (+) ESRD (end stage renal disease) on dialysis (CMS/HCC)   (+) Hyponatremia      Liver   (+) Acute passive congestion of liver      Endocrine   (+) Acquired hypothyroidism   (+) Goiter diffuse, nontoxic   (+) Simple goiter      Hematology   (+) Deep vein thrombosis (DVT) (CMS/HCC)   (+) Iron deficiency anemia      Musculoskeletal   (+) Carpal tunnel syndrome   (+) Lupus erythematosus      ID   (+) Trichimoniasis   (+) Viral wart, unspecified   (+) Yeast vaginitis       Clinical information reviewed:   Tobacco  Allergies  Meds  Problems  Med Hx  Surg Hx  OB Status    Fam Hx  Soc Hx        NPO Detail:  No data recorded     Physical Exam    Airway  Mallampati: unable to assess     Cardiovascular    Dental    Pulmonary    Abdominal            Anesthesia Plan    History of general anesthesia?: yes  History of complications of general anesthesia?: no    ASA 4 - emergent     general   (Emergent femoral ECMO placement, pt on Impella, with transplanted heart failure, pt on inotropes, GA, Jaelyn and CVL in place, BOB)  intravenous induction   Anesthetic plan and risks discussed with patient.  Use of blood products discussed with patient who consented to blood products.    Plan discussed with attending and resident.

## 2024-03-27 NOTE — PROCEDURES
Arterial Line Insertion    Date/Time: 3/27/2024 4:59 PM    Performed by: DIANA Ruelas  Authorized by: DIANA Ruelas    Consent:     Consent obtained:  Verbal    Consent given by:  Guardian (Patient's mother Fani at bedside for emergent placement)    Risks, benefits, and alternatives were discussed: yes      Risks discussed:  Bleeding, ischemia, infection, pain and repeat procedure  Universal protocol:     Procedure explained and questions answered to patient or proxy's satisfaction: yes      Relevant documents present and verified: yes      Test results available: yes      Patient identity confirmed:  Anonymous protocol, patient vented/unresponsive, hospital-assigned identification number and arm band  Indications:     Indications: hemodynamic monitoring and multiple ABGs    Pre-procedure details:     Skin preparation:  Chlorhexidine    Preparation: Patient was prepped and draped in sterile fashion    Sedation:     Sedation type:  None  Anesthesia:     Anesthesia method:  None  Procedure details:     Location:  R radial    Morris's test performed: yes      Morris's test abnormal: yes (Poor perfusion due to cardiogenic shock)      Needle gauge:  18 G    Placement technique:  Ultrasound guided    Number of attempts:  1    Transducer: waveform confirmed    Post-procedure details:     Post-procedure:  Sterile dressing applied    CMS:  Unchanged    Procedure completion:  Tolerated  Comments:      Emergent placement of A-line due to worsening lethargy in setting of cardiogenic shock. Patient's mother at bedside and provided education, along with risks/benefits. All questions answered. Patient's mother agreeable for procedure due to patient's inability to participate in consent process and need for emergent hemodynamic monitoring.

## 2024-03-27 NOTE — BRIEF OP NOTE
Date: 2/15/2024 - 3/27/2024  OR Location: [facility] OR    Name: Charla Bowles, : 1990, Age: 34 y.o., MRN: [id number], Sex: female    Diagnosis  Pre-op Diagnosis     * Cardiogenic shock (CMS/HCC) [R57.0] Post-op Diagnosis     * Cardiogenic shock (CMS/HCC) [R57.0]     Procedures  Placement Extracorporeal Membrane Oxygenation  52777 - VT ECMO/ECLS INITIATION VENO-ARTERIAL  ECMO Insertion: Percutaneous cannulation of right femoral artery with 17 fr cannula., percutaneous cannulation of right femoral vein with 25 fr. Cannula   Placement of 6fr. Distal perfusion catheter in right femoral artery     Surgeons      * Ruben Booker - Primary    Resident/Fellow/Other Assistant:  Surgeon(s) and Role:     * Darlene Mcwilliams MD - Assisting Kiet Robb,     Procedure Summary  Anesthesia: General  ASA: IV  Anesthesia Staff: Anesthesiologist: Arian Ha MD; Good Chavez MD; Yessenia Blackburn MD  CRNA: JIMMY Child-CRNA  Anesthesia Resident: Jose Henson MD  Estimated Blood Loss: 50mL  Intra-op Medications:   Administrations occurring from 1555 to 1925 on 24:   Medication Name Total Dose   heparin 1,000 unit/mL injection 10,000 Units   benzocaine-menthol (Cepastat Sore Throat) 15-3.6 mg lozenge 1 lozenge Cannot be calculated   calcitriol (Rocaltrol) solution 0.5 mcg Cannot be calculated   darbepoetin floyd (Aranesp) injection 100 mcg Cannot be calculated   dextrose 50 % injection 25 g Cannot be calculated   diphenhydrAMINE (BENADryl) injection 25 mg Cannot be calculated   EPINEPHrine (Adrenalin) 4 mg in dextrose 5% 250 mL (16 mcg/mL) infusion (premix) 0.5 mg   ferrous sulfate (325 mg ferrous sulfate) tablet 1 tablet Cannot be calculated   folic acid (Folvite) tablet 1 mg Cannot be calculated   glucagon (Glucagen) injection 1 mg Cannot be calculated   guaiFENesin (Mucinex) 12 hr tablet 600 mg Cannot be calculated   HYDROmorphone (Dilaudid) injection 0.2 mg Cannot be  calculated   insulin lispro (HumaLOG) injection 0-10 Units Cannot be calculated   levothyroxine (Synthroid, Levoxyl) tablet 200 mcg Cannot be calculated   lidocaine 4 % patch 1 patch Cannot be calculated   lubricating eye drops ophthalmic solution 2 drop Cannot be calculated   microfibrllar collagen (Hemostat) pad Cannot be calculated   multivitamin with minerals 1 tablet Cannot be calculated   mupirocin (Bactroban) 2 % ointment Cannot be calculated   mycophenolate (Myfortic) EC tablet 180 mg Cannot be calculated   norepinephrine (Levophed) 8 mg in dextrose 5% 250 mL (0.032 mg/mL) infusion (premix) 0 mg   nystatin (Mycostatin) 100,000 unit/mL suspension 500,000 Units Cannot be calculated   ondansetron (Zofran) injection 4 mg Cannot be calculated   oxyCODONE (Roxicodone) immediate release tablet 5 mg Cannot be calculated   pantoprazole (ProtoNix) EC tablet 40 mg Cannot be calculated   perflutren lipid microspheres (Definity) injection 0.5-10 mL of dilution Cannot be calculated   perflutren protein A microsphere (Optison) injection 0.5 mL Cannot be calculated   polyethylene glycol (Glycolax, Miralax) packet 17 g Cannot be calculated   predniSONE (Deltasone) tablet 10 mg Cannot be calculated   sennosides-docusate sodium (Patti-Colace) 8.6-50 mg per tablet 1 tablet Cannot be calculated   sodium chloride (Ocean) 0.65 % nasal spray 1 spray Cannot be calculated   sulfur hexafluoride microsphr (Lumason) injection 24.28 mg Cannot be calculated   tacrolimus (Prograf) capsule 1 mg Cannot be calculated   tacrolimus (Prograf) capsule 1 mg Cannot be calculated   valGANciclovir (Valcyte) tablet 450 mg Cannot be calculated   acetaminophen (Tylenol) tablet 975 mg Cannot be calculated   melatonin tablet 10 mg Cannot be calculated              Anesthesia Record               Intraprocedure I/O Totals          Intake    Transfuse RBC, Leukocytes Reduced (CMV reduced risk) 350.00 mL    Norepinephrine Drip 0.00 mL    The total shown is  the total volume documented since Anesthesia Start was filed.    DOBUTamine Drip 0.00 mL    The total shown is the total volume documented since Anesthesia Start was filed.    Epinephrine Drip 0.00 mL    The total shown is the total volume documented since Anesthesia Start was filed.    Total Intake 350 mL       Output    Urine 20 mL    Total Output 20 mL       Net    Net Volume 330 mL          Specimen: No specimens collected     Staff:   Circulator: Kristen Bender RN  Scrub Person: Isabell Fitzgerald RN          Findings: cardiogenic shock     Complications:  None; patient tolerated the procedure well.     Disposition: ICU - intubated and hemodynamically stable.  Condition: stable  Specimens Collected: No specimens collected  Attending Attestation: I was present and scrubbed for the key portions of the procedure.    Ruben Booker  Phone Number: [phone]

## 2024-03-27 NOTE — SIGNIFICANT EVENT
ENT significant event note    ENT was reengaged intraoperatively in OR 21 at the end of procedure of ECMO cannulation for blood coming from the oral cavity.  Patient was heparinized intraoperatively with 5000 units approximately an hour and a half before ENT was engaged.  On inspection it appears the patient is having left anterior epistaxis with source from the left anterior septum.  Patient remained intubated and Afrin was instilled into the bilateral naris and pressure was held.  This was not successful and an Afrin-soaked pledget was then placed on the left anterior septum which slowed the bleeding.  There was still some blood oozing on oropharyngeal exam coming from the nasopharynx and a 10 cm Merocel was then placed into the left nasal cavity and was inflated with Afrin.  Repeat oropharyngeal exam was hemostatic.  Patient remained intubated.    ENT will follow for epistaxis  ENT will remove Merocel later this week  Please engage ENT if concerns for further bleeding especially around the time of extubation      Giuseppe Lagos, PGY2  I am the night resident. I can only be contacted from 5 PM to 6 AM. Page on weekends or off hours.   Otolaryngology - Head & Neck Surgery  ENT Consult pager: [phone]  Peds pager: [phone]  Adult Head & Neck Phone: [phone]  ENT subspecialty team: Manan individual resident who wrote today's note  Please page if urgent

## 2024-03-27 NOTE — ANESTHESIA PROCEDURE NOTES
Airway  Date/Time: 3/27/2024 4:18 PM  Urgency: elective    Airway not difficult    Staffing  Performed: resident   Authorized by: Good Chavez MD    Performed by: Jose Henson MD  Patient location during procedure: OR    Indications and Patient Condition  Indications for airway management: anesthesia  Spontaneous Ventilation: absent  Sedation level: deep  Preoxygenated: yes  Patient position: sniffing  Mask difficulty assessment: 2 - vent by mask + OA or adjuvant +/- NMBA  No planned trial extubation    Final Airway Details  Final airway type: endotracheal airway      Successful airway: ETT  Cuffed: yes   Successful intubation technique: direct laryngoscopy  Facilitating devices/methods: intubating stylet  Endotracheal tube insertion site: oral  Blade: Purvi  Blade size: #4  ETT size (mm): 7.5  Cormack-Lehane Classification: grade I - full view of glottis  Placement verified by: chest auscultation and capnometry   Number of attempts at approach: 1

## 2024-03-27 NOTE — NURSING NOTE
14:00-Cardiac surgery at bedside to assess pt for ECMO  14:15-HFICU team at bedside Dr. Toussaint aware of pt condition  14:30-norepinephrine added in addition to epi and dobutamine for support.  14:45-unable to obtain arterial line. cuff BP 67/41 (50), , o2 86%   Impella reading 90/75 (63)   14:50-HFICU still at bedside   14:52-Vascular US at bedside  14:53-cuff bp 88/75 (81), hr 103  Impella: 99/78, flow 3.2 purge rate, 11.2 purge pressure 533, current 309/242  14:55-

## 2024-03-27 NOTE — PROGRESS NOTES
"Charla Bowles is a 34 y.o. female on day 41 of admission presenting with Cardiogenic shock (CMS/HCC).    Subjective   Pt in bed, drowsy but able to converse; A&Ox2-3. Intermittently closing eyes during conversation and repeating phrases. Reporting 10/10 low back and B/L knee pain; no relief with muscle relaxers. Spoke with pt's mother over the phone regarding plan of care.    Objective     Physical Exam  Vitals reviewed.   Constitutional:       General: She is awake.      Appearance: She is normal weight. She is ill-appearing.   HENT:      Head: Normocephalic and atraumatic.      Mouth/Throat:      Mouth: Mucous membranes are moist.   Eyes:      Extraocular Movements: Extraocular movements intact.      Pupils: Pupils are equal, round, and reactive to light.   Cardiovascular:      Rate and Rhythm: Regular rhythm. Tachycardia present.   Pulmonary:      Effort: Pulmonary effort is normal.      Breath sounds: Normal breath sounds.   Abdominal:      General: There is distension.      Palpations: Abdomen is soft.   Skin:     General: Skin is warm and dry.   Neurological:      Mental Status: She is oriented to person, place, and time. She is lethargic.   Psychiatric:         Mood and Affect: Affect is angry.         Speech: Speech is delayed.         Behavior: Behavior is cooperative.         Last Recorded Vitals  Blood pressure 97/82, pulse 97, temperature 36 °C (96.8 °F), temperature source Temporal, resp. rate 19, height 1.549 m (5' 0.98\"), weight 96 kg (211 lb 10.3 oz), SpO2 (!) 81 %.  Intake/Output last 3 Shifts:  I/O last 3 completed shifts:  In: 1149.7 (11.5 mL/kg) [I.V.:699.7 (7 mL/kg); Blood:350; IV Piggyback:100]  Out: 3253 (32.7 mL/kg) [Other:3253]  Dosing Weight: 99.6 kg     Relevant Results  Scheduled medications  acetaminophen, 975 mg, oral, q8h  [Held by provider] aspirin, 81 mg, oral, Daily  calcitriol, 0.5 mcg, oral, Daily  cyclobenzaprine, 5 mg, oral, BID  darbepoetin floyd, 100 mcg, subcutaneous, " q14 days  ferrous sulfate (325 mg ferrous sulfate), 65 mg of iron, oral, Daily with breakfast  folic acid, 1 mg, oral, Daily  insulin lispro, 0-10 Units, subcutaneous, Before meals & nightly  levothyroxine, 200 mcg, oral, Daily  lidocaine, 1 patch, transdermal, Daily  melatonin, 10 mg, oral, Nightly  multivitamin with minerals, 1 tablet, oral, Daily  mycophenolate, 180 mg, oral, BID  nystatin, 5 mL, Swish & Swallow, q6h  pantoprazole, 40 mg, oral, BID AC  perflutren lipid microspheres, 0.5-10 mL of dilution, intravenous, Once in imaging  perflutren protein A microsphere, 0.5 mL, intravenous, Once in imaging  predniSONE, 10 mg, oral, Daily  [Held by provider] rosuvastatin, 40 mg, oral, Nightly  sennosides-docusate sodium, 1 tablet, oral, BID  [Held by provider] sulfamethoxazole-trimethoprim, 80 mg of trimethoprim, oral, Daily  sulfur hexafluoride microsphr, 2 mL, intravenous, Once in imaging  tacrolimus, 1 mg, oral, q24h  tacrolimus, 1 mg, oral, q24h  traZODone, 50 mg, oral, Nightly  valGANciclovir, 450 mg, oral, q48h      Continuous medications  DOBUTamine, 7.5 mcg/kg/min (Dosing Weight), Last Rate: 7.5 mcg/kg/min (03/27/24 1200)  EPINEPHrine, 0-2 mcg/kg/min (Dosing Weight), Last Rate: 0.04 mcg/kg/min (03/27/24 1200)  PrismaSol 4/2.5, 25 mL/kg/hr, Last Rate: 25 mL/kg/hr (03/27/24 1200)  sodium bicarbonate infusion Impella Purge 25 mEq/1000 mL D5W, 10 mL/hr      PRN medications  PRN medications: benzocaine-menthol, dextrose, dextrose **OR** glucagon, diphenhydrAMINE, glucagon, guaiFENesin, HYDROmorphone, artificial tears, microfibrllar collagen, mupirocin, ondansetron, oxyCODONE, polyethylene glycol, sodium chloride    Results for orders placed or performed during the hospital encounter of 02/15/24 (from the past 24 hour(s))   Lactate Dehydrogenase   Result Value Ref Range    LDH 1,333 (H) 84 - 246 U/L   Comprehensive metabolic panel   Result Value Ref Range    Glucose 153 (H) 74 - 99 mg/dL    Sodium 133 (L) 136 -  145 mmol/L    Potassium 4.9 3.5 - 5.3 mmol/L    Chloride 98 98 - 107 mmol/L    Bicarbonate 23 21 - 32 mmol/L    Anion Gap 17 10 - 20 mmol/L    Urea Nitrogen 17 6 - 23 mg/dL    Creatinine 1.26 (H) 0.50 - 1.05 mg/dL    eGFR 58 (L) >60 mL/min/1.73m*2    Calcium 8.2 (L) 8.6 - 10.6 mg/dL    Albumin 3.5 3.4 - 5.0 g/dL    Alkaline Phosphatase 259 (H) 33 - 110 U/L    Total Protein 6.1 (L) 6.4 - 8.2 g/dL     (H) 9 - 39 U/L    Bilirubin, Total 3.9 (H) 0.0 - 1.2 mg/dL     (H) 7 - 45 U/L   POCT GLUCOSE   Result Value Ref Range    POCT Glucose 151 (H) 74 - 99 mg/dL   POCT GLUCOSE   Result Value Ref Range    POCT Glucose 215 (H) 74 - 99 mg/dL   Reticulocytes   Result Value Ref Range    Retic % 8.8 (H) 0.5 - 2.0 %    Retic Absolute 0.248 (H) 0.018 - 0.083 x10*6/uL    Reticulocyte Hemoglobin 34 28 - 38 pg    Immature Retic fraction 43.5 (H) <=16.0 %   Magnesium   Result Value Ref Range    Magnesium 2.62 (H) 1.60 - 2.40 mg/dL   Lactate Dehydrogenase   Result Value Ref Range    LDH 1,360 (H) 84 - 246 U/L   Lactate   Result Value Ref Range    Lactate 2.6 (H) 0.4 - 2.0 mmol/L   Fibrinogen   Result Value Ref Range    Fibrinogen 166 (L) 200 - 400 mg/dL   D-dimer, Non VTE   Result Value Ref Range    D-Dimer Non VTE, Quant (ng/mL FEU) 4,208 (H) <=500 ng/mL FEU   Coagulation Screen   Result Value Ref Range    Protime 31.2 (H) 9.8 - 12.8 seconds    INR 2.7 (H) 0.9 - 1.1    aPTT 52 (H) 27 - 38 seconds   Comprehensive metabolic panel   Result Value Ref Range    Glucose 94 74 - 99 mg/dL    Sodium 133 (L) 136 - 145 mmol/L    Potassium 4.6 3.5 - 5.3 mmol/L    Chloride 98 98 - 107 mmol/L    Bicarbonate 25 21 - 32 mmol/L    Anion Gap 15 10 - 20 mmol/L    Urea Nitrogen 15 6 - 23 mg/dL    Creatinine 1.12 (H) 0.50 - 1.05 mg/dL    eGFR 67 >60 mL/min/1.73m*2    Calcium 8.1 (L) 8.6 - 10.6 mg/dL    Albumin 3.5 3.4 - 5.0 g/dL    Alkaline Phosphatase 252 (H) 33 - 110 U/L    Total Protein 6.3 (L) 6.4 - 8.2 g/dL     (H) 9 - 39 U/L     Bilirubin, Total 3.6 (H) 0.0 - 1.2 mg/dL     (H) 7 - 45 U/L   CBC and Auto Differential   Result Value Ref Range    WBC 6.7 4.4 - 11.3 x10*3/uL    nRBC 17.1 (H) 0.0 - 0.0 /100 WBCs    RBC 2.83 (L) 4.00 - 5.20 x10*6/uL    Hemoglobin 8.5 (L) 12.0 - 16.0 g/dL    Hematocrit 25.6 (L) 36.0 - 46.0 %    MCV 91 80 - 100 fL    MCH 30.0 26.0 - 34.0 pg    MCHC 33.2 32.0 - 36.0 g/dL    RDW 25.4 (H) 11.5 - 14.5 %    Platelets 64 (L) 150 - 450 x10*3/uL    Immature Granulocytes %, Automated 1.8 (H) 0.0 - 0.9 %    Immature Granulocytes Absolute, Automated 0.12 0.00 - 0.70 x10*3/uL   Calcium, Ionized   Result Value Ref Range    POCT Calcium, Ionized 1.03 (L) 1.1 - 1.33 mmol/L   Bilirubin, Direct   Result Value Ref Range    Bilirubin, Direct 1.9 (H) 0.0 - 0.3 mg/dL   Manual Differential   Result Value Ref Range    Neutrophils %, Manual 96.5 40.0 - 80.0 %    Lymphocytes %, Manual 0.0 13.0 - 44.0 %    Monocytes %, Manual 3.5 2.0 - 10.0 %    Eosinophils %, Manual 0.0 0.0 - 6.0 %    Basophils %, Manual 0.0 0.0 - 2.0 %    Seg Neutrophils Absolute, Manual 6.47 1.20 - 7.00 x10*3/uL    Lymphocytes Absolute, Manual 0.00 (L) 1.20 - 4.80 x10*3/uL    Monocytes Absolute, Manual 0.23 0.10 - 1.00 x10*3/uL    Eosinophils Absolute, Manual 0.00 0.00 - 0.70 x10*3/uL    Basophils Absolute, Manual 0.00 0.00 - 0.10 x10*3/uL    Total Cells Counted 115     RBC Morphology See Below     Polychromasia Mild     RBC Fragments Few     Basophilic Stippling Present    Tacrolimus level   Result Value Ref Range    Tacrolimus  14.4 <=15.0 ng/mL   Lactate   Result Value Ref Range    Lactate 2.4 (H) 0.4 - 2.0 mmol/L   Heparin Assay   Result Value Ref Range    Heparin Unfractionated 0.1 See Comment Below for Therapeutic Ranges IU/mL   POCT GLUCOSE   Result Value Ref Range    POCT Glucose 113 (H) 74 - 99 mg/dL   Blood Gas Arterial Full Panel   Result Value Ref Range    POCT pH, Arterial 7.41 7.38 - 7.42 pH    POCT pCO2, Arterial 34 (L) 38 - 42 mm Hg    POCT pO2,  Arterial 110 (H) 85 - 95 mm Hg    POCT SO2, Arterial 99 94 - 100 %    POCT Oxy Hemoglobin, Arterial 95.5 94.0 - 98.0 %    POCT Hematocrit Calculated, Arterial 28.0 (L) 36.0 - 46.0 %    POCT Sodium, Arterial 131 (L) 136 - 145 mmol/L    POCT Potassium, Arterial 4.6 3.5 - 5.3 mmol/L    POCT Chloride, Arterial 99 98 - 107 mmol/L    POCT Ionized Calcium, Arterial 1.06 (L) 1.10 - 1.33 mmol/L    POCT Glucose, Arterial 150 (H) 74 - 99 mg/dL    POCT Lactate, Arterial 2.4 (H) 0.4 - 2.0 mmol/L    POCT Base Excess, Arterial -2.6 (L) -2.0 - 3.0 mmol/L    POCT HCO3 Calculated, Arterial 21.6 (L) 22.0 - 26.0 mmol/L    POCT Hemoglobin, Arterial 9.2 (L) 12.0 - 16.0 g/dL    POCT Anion Gap, Arterial 15 10 - 25 mmo/L    Patient Temperature 37.0 degrees Celsius    FiO2 21 %   Ammonia   Result Value Ref Range    Ammonia 28 16 - 53 umol/L   Lactate   Result Value Ref Range    Lactate 2.6 (H) 0.4 - 2.0 mmol/L   C-reactive protein   Result Value Ref Range    C-Reactive Protein 4.27 (H) <1.00 mg/dL   Sedimentation rate, automated   Result Value Ref Range    Sedimentation Rate <1 0 - 20 mm/h   Comprehensive metabolic panel   Result Value Ref Range    Glucose 134 (H) 74 - 99 mg/dL    Sodium 135 (L) 136 - 145 mmol/L    Potassium 4.7 3.5 - 5.3 mmol/L    Chloride 99 98 - 107 mmol/L    Bicarbonate 26 21 - 32 mmol/L    Anion Gap 15 10 - 20 mmol/L    Urea Nitrogen 14 6 - 23 mg/dL    Creatinine 1.09 (H) 0.50 - 1.05 mg/dL    eGFR 69 >60 mL/min/1.73m*2    Calcium 7.7 (L) 8.6 - 10.6 mg/dL    Albumin 3.3 (L) 3.4 - 5.0 g/dL    Alkaline Phosphatase 239 (H) 33 - 110 U/L    Total Protein 5.9 (L) 6.4 - 8.2 g/dL     (H) 9 - 39 U/L    Bilirubin, Total 4.0 (H) 0.0 - 1.2 mg/dL     (H) 7 - 45 U/L   Blood Gas Lactic Acid, Venous   Result Value Ref Range    POCT Lactate, Venous 2.8 (H) 0.4 - 2.0 mmol/L   Blood Culture    Specimen: Peripheral Venipuncture; Blood culture   Result Value Ref Range    Blood Culture Loaded on Instrument - Culture in progress     Blood Culture    Specimen: Peripheral Arterial Puncture; Blood culture   Result Value Ref Range    Blood Culture Loaded on Instrument - Culture in progress    POCT GLUCOSE   Result Value Ref Range    POCT Glucose 147 (H) 74 - 99 mg/dL   Prepare Plasma: 1 Units   Result Value Ref Range    PRODUCT CODE A0510Z25     Unit Number [id number]     Unit ABO O     Unit RH POS     Dispense Status IS     Blood Expiration Date March 31, 2024 04:56 EDT     PRODUCT BLOOD TYPE 5100     UNIT VOLUME 292      *Note: Due to a large number of results and/or encounters for the requested time period, some results have not been displayed. A complete set of results can be found in Results Review.     XR chest 1 view    Result Date: 3/20/2024  Interpreted By:  Sandra Griffith, STUDY: XR CHEST 1 VIEW;  3/20/2024 7:27 am   INDICATION: Signs/Symptoms:Impella device.   COMPARISON: 03/19/2024   ACCESSION NUMBER(S): [id number]   ORDERING CLINICIAN: KONSTANTIN PERKINS   FINDINGS: Patient is status post median sternotomy. Right subclavian approach Impella device again identified in similar position. Right IJ catheter with the tip in the projection superior vena cava. CardioMEMS device projected over the cardiomediastinal silhouette.       CARDIOMEDIASTINAL SILHOUETTE: Cardiomediastinal silhouette is stable in size and configuration.   LUNGS: The lungs are hypoexpanded with crowding of the bronchovascular markings. Lung fields are essentially clear   ABDOMEN: No remarkable upper abdominal findings.   BONES: No acute osseous changes.       1.  No evidence of acute cardiopulmonary process.       MACRO: None   Signed by: Sandra Griffith 3/20/2024 11:28 AM Dictation workstation:   [id number]    XR chest 1 view    Result Date: 3/19/2024  Interpreted By:  Randy Bardales and Sheng Max STUDY: XR CHEST 1 VIEW;  3/19/2024 8:00 am   INDICATION: Signs/Symptoms:Impella.   COMPARISON: Chest radiograph dated 3/18/2024, 03/17/2024, 03/16/2024 and CT chest  abdomen pelvis dated 03/17/2024   ACCESSION NUMBER(S): [id number]   ORDERING CLINICIAN: KONSTANTIN PERKINS   FINDINGS: AP radiograph of the chest     LINES AND DEVICES: Right subclavian approach Impella device project over the left ventricle not significantly changed from prior exam. Right internal jugular approach central venous catheter tip projects over the mid SVC. CardioMEMS device projects over the cardiomediastinal silhouette. Numerous intact median sternotomy wires. Surgical clips project over the right axilla.   CARDIOMEDIASTINAL SILHOUETTE: Stable enlargement the cardiomediastinal silhouette. Aortic knob calcifications are seen.   LUNGS: Unchanged appearance of mild pulmonary edema. Persistent small left and trace right pleural effusion. No focal consolidation or pneumothorax.   ABDOMEN: No remarkable upper abdominal findings.   BONES: No acute osseous abnormality.       1. Right subclavian approach Impella device projects over the expected location of the left ventricle not significant changed from prior exam. 2. Unchanged appearance of mild pulmonary edema. 3. Additional medical devices as detailed above.   I personally reviewed the images/study and I agree with the findings as stated by Dr. Tee Joe. This study was interpreted at [place], [place].   MACRO: None   Signed by: Randy Bardales 3/19/2024 2:21 PM Dictation workstation:   [id number]    XR chest 1 view    Result Date: 3/18/2024  Interpreted By:  Randy Bardales and Sheng Max STUDY: XR CHEST 1 VIEW;  3/18/2024 7:43 am   INDICATION: Signs/Symptoms:Impella.   COMPARISON: Chest radiograph dated 3/17/2024, 03/16/2024, 03/15/2024 and CT chest abdomen pelvis dated 03/17/2024   ACCESSION NUMBER(S): [id number]   ORDERING CLINICIAN: KONSTANTIN PERKINS   FINDINGS: AP radiograph of the chest     LINES AND DEVICES: Right subclavian approach Impella device project over the left ventricle. Right internal jugular  approach central venous catheter tip projects over the mid SVC. CardioMEMS device projects over the cardiomediastinal silhouette. Numerous intact median sternotomy wires. Surgical staples project over the right clavicular region.   CARDIOMEDIASTINAL SILHOUETTE: Stable enlargement of the cardiomediastinal silhouette.   LUNGS: Similar appearance of mild interstitial prominence suggestive of mild pulmonary edema. Persistent small left pleural effusion. No focal consolidation or pneumothorax.   ABDOMEN: No remarkable upper abdominal findings.   BONES: No acute osseous abnormality.       1. Right subclvian approach Impella device projects over the expected location of the left ventricle. 2. Enlargement of the cardiomediastinal silhouette, persistent small left pleural effusion and mild pulmonary edema is compatible with volume overload not significantly changed from prior study     I personally reviewed the images/study and I agree with the findings as stated by Dr. Tee Joe. This study was interpreted at [place], [place].   MACRO: None   Signed by: Randy Bardales 3/18/2024 12:16 PM Dictation workstation:   [id number]    Electrocardiogram, 12-lead PRN ACS symptoms    Result Date: 3/18/2024  Sinus tachycardia Possible Left atrial enlargement Low voltage QRS RSR' or QR pattern in V1 suggests right ventricular conduction delay Cannot rule out Anterior infarct (cited on or before 14-JUN-2023) Abnormal ECG When compared with ECG of 02-MAR-2024 20:41, RSR' pattern in V1 is now Present Questionable change in initial forces of Anteroseptal leads    CT chest abdomen pelvis wo IV contrast    Result Date: 3/17/2024  Interpreted By:  Migel Degroot and Barbat Antonio STUDY: CT CHEST ABDOMEN PELVIS WO CONTRAST;  3/17/2024 2:32 pm   INDICATION: Signs/Symptoms:radiating pain in stomach to back.   COMPARISON: CT chest abdomen and pelvis without contrast 03/14/2024.    ACCESSION NUMBER(S): [id number]   ORDERING CLINICIAN: CLARICE JOHNSTON   TECHNIQUE: CT of the chest, abdomen and pelvis was performed. Contiguous axial images were obtained at 3 mm slice thickness through the chest, abdomen and pelvis. Coronal and sagittal reconstructions at 3 mm slice thickness were performed.  No intravenous or oral contrast agents were administered.   FINDINGS: Please note that the study is limited without intravenous contrast.   CHEST:   LUNG/PLEURA/LARGE AIRWAYS: Small bilateral pleural effusions with associated bibasilar atelectasis, similar to prior exam. No focal consolidation or pneumothorax.   VESSELS: No evidence of a aortic hematoma. Unable to assess for additional acute aortic pathology in the setting of a noncontrast examination. Aorta and pulmonary arteries are normal caliber.  Atherosclerotic changes of the aorta.  No coronary artery calcifications are present.Right subclavian approach Impella device with the distal tip terminating at the apex of the left ventricle. Right-sided IJ approach hemodialysis catheter, with the distal tip projecting over the expected region of the mid-svc. Interval removal of [place]-Estevan catheter.   HEART: The heart is mildly enlarged, similar to prior exam. Trace pericardial fluid is noted, similar to prior exam.   MEDIASTINUM AND YANE: No mediastinal, hilar or axillary lymph nodes are present. Esophagus: Within normal limits.   CHEST WALL AND LOWER NECK: Unchanged postsurgical changes consistent with median sternotomy. Similar component of moderate body wall anasarca. The visualized thyroid gland appears within normal limits.   ABDOMEN:   LIVER: The liver is enlarged measuring 20.5 cm in craniocaudal dimension.   BILE DUCTS: The bile ducts are not dilated.   GALLBLADDER: The gallbladder is not distended. There hyperdense content noted in the gallbladder, likely to represent sludge or vicarious excretion of contrast material from previous exam.   PANCREAS:  There is mild haziness of the pancreatic head and duodenal pancreatic groove, improved from the previous exam, and limited in assessment given diffuse mesenteric fat stranding. The remainder of the pancreas appears unremarkable. There is no pancreatic ductal dilatation or peripancreatic fluid collections noted.   SPLEEN: Within normal limits.   ADRENAL GLANDS: Diffuse thickening of the left adrenal gland without focal nodule/mass, similar to prior exam   KIDNEYS AND URETERS: The kidneys have normal size and enhance symmetrically.  No hydroureteronephrosis or nephroureterolithiasis is present.   PELVIS:   BLADDER: The urinary bladder is decompressed, limited for evaluation.   REPRODUCTIVE ORGANS: The uterus is surgically absent. Similar appearance of a 3.3 x 2.9 cm hypodense lesion within the right adnexa, which may represent an ovarian cyst.   BOWEL: Redemonstration of postsurgical changes consistent with gastric sleeve bariatric surgery. The small and large bowel are normal in caliber and demonstrate no wall thickening. The appendix is surgically absent.   VESSELS: The aorta and IVC are within normal limits, within the limitations of a noncontrast examination.   PERITONEUM/RETROPERITONEUM/LYMPH NODES: Mild-to-moderate component of simple fluid attenuating abdominopelvic fluid, predominantly within the pelvis. Diffuse haziness of the mesentery, similar to prior exam. No loculated fluid collection. Within limitations of this noncontrast examination, there is no evidence abdominopelvic lymphadenopathy.   ABDOMINAL WALL: Similar component of moderate body wall anasarca. Several injection granulomas within the anterior abdominal wall. Fat containing periumbilical hernia. Redemonstration of a 3.5 cm hyperdense collection in the left inguinal subcutaneous tissue, likely a small hematoma.   BONES: No suspicious osseous lesions are present. Degenerative discogenic disease is noted in the lower thoracic and lumbar spine.        1. No evidence of aortic hematoma on noncontrast CT. Unable to assess for addition acute aortic pathology in the setting of a noncontrast examination. 2. Incompletely characterized haziness of the pancreatic head and duodenal pancreatic groove, which are limited in assessment given diffuse mesenteric and retroperitoneal fat stranding. However, recommend correlation with lipase to rule out interstitial pancreatitis or groove pancreatitis. There is no pancreatic ductal dilatation or peripancreatic fluid collections noted. 3. Small bilateral pleural effusions with associated bibasilar atelectasis. Moderate diffuse body wall anasarca. Diffuse mesenteric haziness. Mild-to-moderate simple fluid attenuating abdominopelvic ascites. Mild cardiomegaly. These findings are similar to prior exam. Correlate with patient's volume status. 4. Other findings and medical devices as above.     I personally reviewed the images/study and I agree with the findings as stated by Johnathon Joe MD. This study was interpreted at [facility], [place], [place]   MACRO: None   Signed by: Migel Springer 3/17/2024 6:46 PM Dictation workstation:   [id number]    XR chest 1 view    Result Date: 3/17/2024  Interpreted By:  Angelina Cagle, STUDY: XR CHEST 1 VIEW; 3/17/2024 7:30 am   INDICATION: Signs/Symptoms:impella am rounds.   COMPARISON: Radiograph dated 03/16/2024   ACCESSION NUMBER(S): [id number]   ORDERING CLINICIAN: CLARICE JOHNSTON   FINDINGS: Right IJ central venous catheter is stable. Impella device is unchanged in location. Pulmonary artery pressure monitoring device is projecting over the left hilar region. Interval removal of [place]-Estevan catheter.   The cardiac silhouette size is slightly enlarged, stable. Status post median sternotomy.   Left lung base and retrocardiac opacity. No edema or pneumothorax.   No acute osseous abnormality.       1. Small left basilar pleural effusion  and left basilar atelectasis, unchanged. 2. Medical devices and postsurgical changes as described above.       Signed by: Angelina Hectorani 3/17/2024 9:24 AM Dictation workstation:   [id number]    Transthoracic Echo (TTE) Limited    Result Date: 3/16/2024   [facility], [address] [place] [address]                Tel [phone] and Fax [phone] TRANSTHORACIC ECHOCARDIOGRAM REPORT  Patient Name:      YAIRKENNEDY MONTELONGO JUDIE      Reading Physician:    [id number] Abel Gurrola MD Study Date:        3/15/2024            Ordering Provider:    [id number] ARANZA LOGAN MRN/PID:           [id number]             Fellow: Accession#:        [id number]         Nurse: Date of Birth/Age: 1990 / 34 years  Sonographer:          DESMOND Gender:            F                    Additional Staff: Weight:                                 Admission Status:     Inpatient -                                                               Routine BSA / BMI:         m2 / kg/m2           Encounter#:           [id number]                                         Department Location:  [address] Study Type:    TRANSTHORACIC ECHO (TTE) LIMITED Diagnosis/ICD: Acute systolic (congestive) heart failure (CHF)-I50.21 Indication:    Acute systolic CHF CPT Code:      Echo Limited-79377; Color Doppler-98664  Study Detail: The following Echo studies were performed: 2D and color flow.  PHYSICIAN INTERPRETATION: Left Ventricle: The left ventricular systolic function is severely decreased, with an estimated ejection fraction of 20-25%. There is global hypokinesis of the left ventricle with minor regional variations. The left ventricular cavity size is mild to moderately dilated. Left ventricular diastolic filling was not assessed. Left Atrium: The left atrium is enlarged. Right Ventricle: The right ventricle is mildly enlarged. There is reduced right ventricular systolic  function. Right Atrium: The right atrium is moderately dilated. Aortic Valve: The aortic valve appears structurally normal. There is indeterminate aortic valve regurgitation. Mitral Valve: The mitral valve is normal in structure. There is moderate mitral valve regurgitation. Tricuspid Valve: The tricuspid valve was not well visualized. Tricuspid regurgitation was not assessed. Pulmonic Valve: The pulmonic valve was not assessed. Pulmonic valve regurgitation was not assessed. Pericardium: There is a trivial pericardial effusion. Aorta: The aortic root was not assessed. In comparison to the previous echocardiogram(s): Compared with study from 3/14/2024, no significant change.  LEFT VENTRICULAR ASSIST DEVICE: LVAD: The patient has a(n) Impella LVAD device present. LVAD Comments: Impella tip is 4.2 cm from the AV plane. It is directed posteriorly and interacting with the papillary muscle.  CONCLUSIONS:  1. Left ventricular systolic function is severely decreased with a 20-25% estimated ejection fraction.  2. There is reduced right ventricular systolic function.  3. The left atrium is enlarged.  4. The right atrium is moderately dilated.  5. Moderate mitral valve regurgitation.  6. Compared with study from 3/14/2024, no significant change.  7. There is global hypokinesis of the left ventricle with minor regional variations. QUANTITATIVE DATA SUMMARY:  [id number] Abel Gurrola MD Electronically signed on 3/16/2024 at 3:08:44 PM  ** Final **     XR chest 1 view    Result Date: 3/16/2024  Interpreted By:  Angelina Cagle, STUDY: XR CHEST 1 VIEW; 3/16/2024 7:36 am   INDICATION: Signs/Symptoms:impella.   COMPARISON: Radiograph dated 03/15/2024   ACCESSION NUMBER(S): [id number]   ORDERING CLINICIAN: CLARICE JOHNSTON   FINDINGS: Lower extremity approach [place]-Estevan catheter is in place with the tip projecting over the main pulmonary artery. Stable Impella device and right IJ central venous catheter.   The cardiac  silhouette size is stable. Status post median sternotomy.   Small left basilar pleural effusion and mild interstitial edema, unchanged. No sizable pneumothorax.   No acute osseous abnormality.       1. No significant interval change in a small left pleural effusion and mild interstitial edema. 2. Medical devices as described above       Signed by: Angelina Narayan 3/16/2024 11:29 AM Dictation workstation:   [id number]    XR chest 1 view    Result Date: 3/15/2024  Interpreted By:  Angelina Cagle, STUDY: XR CHEST 1 VIEW; 3/15/2024 8:09 am   INDICATION: Signs/Symptoms:SGC positioning.   COMPARISON: Radiograph dated 03/14/2024   ACCESSION NUMBER(S): [id number]   ORDERING CLINICIAN: JOANNA KEANE   FINDINGS: Lower extremity approach swans Estevan catheter is projecting over the main pulmonary artery. Impella device is stable. Right IJ central venous catheter is in the mid SVC.   Cardiac silhouette size is stable patient is status post median sternotomy.   Interval improvement in pulmonary edema. Small left pleural effusion and left basilar atelectasis. No sizable pneumothorax.   No acute osseous abnormality.       1. Interval improved pulmonary edema plan 2. Small left pleural effusion and left basilar atelectasis. 3. Medical devices and postsurgical changes as described above.       Signed by: Angelina Narayan 3/15/2024 6:57 PM Dictation workstation:   [id number]    CT chest abdomen pelvis wo IV contrast    Result Date: 3/14/2024  Interpreted By:  Chandra Galicia,  Peter Huerta STUDY: CT CHEST ABDOMEN PELVIS WO CONTRAST;  3/14/2024 9:49 am   INDICATION: Signs/Symptoms:Impella placement. 33 y/o  F with Signs/Symptoms:Impella placement.   COMPARISON: CT chest 03/07/2024.   ACCESSION NUMBER(S): [id number]   ORDERING CLINICIAN: ENRIKE WILD   TECHNIQUE: CT of the chest, abdomen and pelvis was performed. Contiguous axial images were obtained at 3 mm slice thickness through the chest, abdomen and  pelvis in 2 mm slice thickness through the chest. Coronal and sagittal reconstructions at 3 mm slice thickness were performed. No intravenous or oral contrast agents were administered.   FINDINGS: Please note that the study is limited without intravenous contrast.   CHEST:   LUNG/PLEURA/LARGE AIRWAYS: Low lung volumes bilaterally. Small to moderate bilateral pleural effusions with adjacent atelectasis. No focal consolidation or pneumothorax.   VESSELS: Right IJ central venous catheter with distal tip at the mid SVC. Right subclavian approach Impella device with distal tip at the apex of the left ventricle. Right femoral approach [place]-Estevan catheter with distal tip at the proximal left pulmonary artery. Aorta and pulmonary arteries are normal caliber.  No atherosclerotic changes of the aorta. No significant atherosclerotic changes of the coronary arteries.   HEART: The heart is enlarged, similar to prior exam. No pericardial effusion   MEDIASTINUM AND YANE: No mediastinal, hilar or axillary lymph nodes are present.  The esophagus appears normal.   CHEST WALL AND LOWER NECK: Median sternotomy wires noted. Mild anasarca. The visualized thyroid gland appears within normal limits.   ABDOMEN:   LIVER: The liver is mildly enlarged measuring 22.2 cm in the craniocaudal direction without focal lesions.   BILE DUCTS: The bile ducts are not dilated.   GALLBLADDER: The gallbladder is not distended and without calcified stones.   PANCREAS: The pancreas appears unremarkable.   SPLEEN: Within normal limits.   ADRENAL GLANDS: Diffuse thickening of the left adrenal gland without focal nodule/mass. The right adrenal gland is not well visualized.   KIDNEYS AND URETERS: The kidneys have normal size.  No hydroureteronephrosis or nephroureterolithiasis is present.   PELVIS:   BLADDER: The urinary bladder is decompressed, limited for evaluation.   REPRODUCTIVE ORGANS: The uterus is surgically absent. There is a 3.2 x 2.9 cm hypodense  lesion in the right adnexa which may represent an ovarian cyst.   BOWEL: Postsurgical changes are noted consistent with prior gastric sleeve bariatric surgery.  The small and large bowel are normal in caliber and demonstrate no wall thickening.  The appendix is surgically absent.   VESSELS: The aorta and IVC are within normal limits.   PERITONEUM/RETROPERITONEUM/LYMPH NODES: Diffuse haziness of the mesentery. Moderate amount of layering ascites, predominantly within the pelvis. No loculated fluid collection. No abdominopelvic lymphadenopathy is present.   ABDOMINAL WALL: Moderate diffuse anasarca. Left and right lower quadrant subcutaneous soft tissue nodules, favored to represent injection granulomas (series 2, image 127, 153, and 163).   BONES: No suspicious osseous lesions are identified.       Chest 1.  Right subclavian approach Impella device with distal tip of the apex of the left ventricle. Other medical devices as above. 2. Low lung volumes bilaterally with small-to-moderate bilateral pleural effusions and adjacent atelectasis.   Abdomen-Pelvis 1.  Diffuse haziness of the mesentery, moderate amount of layering ascites within the abdomen/pelvis, and moderate diffuse anasarca, likely secondary to patient's poor cardiac function. Correlate with patient's volume status.   I personally reviewed the images/study and I agree with the findings as stated by Dr. Bradley Murillo. This study was interpreted at [facility], [place], [place].   MACRO: None   Signed by: Chandra Galicia 3/14/2024 3:38 PM Dictation workstation:   [id number]    Transthoracic Echo (TTE) Limited    Result Date: 3/14/2024   [facility] [address] [place] [address]                Tel [phone] and Fax [phone] TRANSTHORACIC ECHOCARDIOGRAM REPORT  Patient Name:      MIGUEL DIMAS      Reading Physician:    [id number] Evelyn BASS                                       Tyra MCGILL Study Date:        3/14/2024            Ordering Provider:    [id number] JOANNA KEANE MRN/PID:           [id number]             Fellow:               [id number] Brionna Stiles MD Accession#:        [id number]         Nurse: Date of Birth/Age: 1990 / 34 years  Sonographer:          Raven Arredondo RDCS Gender:            F                    Additional Staff: Height:            152.40 cm            Admit Date:           2/15/2024 Weight:            102.06 kg            Admission Status:     Inpatient -                                                               Routine BSA / BMI:         1.96 m2 / 43.94      Encounter#:           [id number]                    kg/m2                                         Department Location:  [place] [address] MICU Blood Pressure: 114 /86 mmHg Study Type:    TRANSTHORACIC ECHO (TTE) LIMITED Diagnosis/ICD: Cardiogenic shock-R57.0 Indication:    Cardiogenic shock CPT Code:      Echo Limited-83568; Doppler Limited-31509; Color Doppler-06265 Patient History: Pertinent History: Cardiomyopathy, Previous DVT, Palpitations and Dyspnea. Heart                    tx 3/31/22,Heart tx rejection,Cardiogenic                    shock,ESRD,Lupus,Impella 3/1/24. Study Detail: The following Echo studies were performed: 2D, M-Mode, Doppler and               color flow. Technically challenging study due to patient lying in               supine position.  PHYSICIAN INTERPRETATION: Left Ventricle: The left ventricular systolic function is severely decreased, with an estimated ejection fraction of 25-30%. There is global hypokinesis of the left ventricle with minor regional variations. The left ventricular cavity size is normal. Left ventricular diastolic filling was indeterminate. Left Atrium: The left atrium is consistent with transplant. Right Ventricle: The right ventricle is  mildly enlarged. There is mildly reduced right ventricular systolic function. A device is visualized in the right ventricle. Right Atrium: The right atrium is consistent with a transplant. There is a device visualized in the right atrium. Aortic Valve: The aortic valve was not well visualized. There is indeterminate aortic valve regurgitation. Mitral Valve: The mitral valve is normal in structure. There is moderate mitral valve regurgitation which is posteriorly directed. Tricuspid Valve: The tricuspid valve is structurally normal. There is moderate to severe tricuspid regurgitation. The right ventricular systolic pressure is unable to be estimated. Pulmonic Valve: The pulmonic valve is structurally normal. Pulmonic valve regurgitation was not assessed. Pericardium: There is no pericardial effusion noted. Aorta: The aortic root is normal. In comparison to the previous echocardiogram(s): Compared with study from 3/11/2024, the position of the Impella looks similar. There appears to be moderate to severe TR which was not assessed on prior study.  LEFT VENTRICULAR ASSIST DEVICE: LVAD: The patient has a(n) Impella LVAD device present. Outflow Cannula: Visualization of the outflow cannula is technically difficult. LVAD Comments: The inflow cannula is visualized ~ 4.2cm from the aortic valve. The cannula is angled posteriorly within the LV cavity.  CONCLUSIONS:  1. Left ventricular systolic function is severely decreased with a 25-30% estimated ejection fraction.  2. There is mildly reduced right ventricular systolic function.  3. Moderate mitral valve regurgitation.  4. Moderate to severe tricuspid regurgitation visualized.  5. There is global hypokinesis of the left ventricle with minor regional variations. QUANTITATIVE DATA SUMMARY: 2D MEASUREMENTS:                           Normal Ranges: LAs:           4.73 cm    (2.7-4.0cm) IVSd:          1.47 cm    (0.6-1.1cm) LVPWd:         1.27 cm    (0.6-1.1cm) LVIDd:          4.00 cm    (3.9-5.9cm) LVIDs:         3.57 cm LV Mass Index: 102.9 g/m2 LV % FS        10.8 % LV SYSTOLIC FUNCTION BY 2D PLANIMETRY (MOD):                     Normal Ranges: EF-A4C View: 36.1 % (>=55%) EF-A2C View: 45.0 % EF-Biplane:  40.1 % AORTIC VALVE:                        Normal Ranges: LVOT Diameter: 1.88 cm (1.8-2.4cm) TRICUSPID VALVE/RVSP:                             Normal Ranges: Peak TR Velocity: 2.61 m/s RV Syst Pressure: 30.2 mmHg (< 30mmHg)  [id number] Evelyn Mary MD Electronically signed on 3/14/2024 at 2:23:08 PM  ** Final **     XR chest 1 view    Result Date: 3/14/2024  Interpreted By:  Omari Gutierrez, STUDY: XR CHEST 1 VIEW;  3/14/2024 7:20 am   INDICATION: Signs/Symptoms:SGC positioning.   COMPARISON: 03/13/2024   ACCESSION NUMBER(S): [id number]   ORDERING CLINICIAN: JOANNA KEANE   FINDINGS: AP radiograph of the chest was provided.   Impella device entering from the right axillary artery remains in satisfactory position. [place]-Estevan catheter entering via the inferior vena cava is again noted. The tip appears directed toward the right pulmonary artery. Central venous catheter entering via the right jugular vein remains unchanged in position within the superior vena cava above the cavoatrial junction. CardioMEMS device is again noted on the left.   CARDIOMEDIASTINAL SILHOUETTE: Cardiomediastinal silhouette is large but stable in size and configuration.   LUNGS: There is slight recurrence of vascular congestion relative to the previous study. There is no segmental consolidation. The pleural spaces appear clear.   ABDOMEN: No remarkable upper abdominal findings.   BONES: No acute osseous changes.       1.  Mild recurrence of vascular congestion in the interim. No other significant changes.       MACRO: None   Signed by: Omari Gutierrez 3/14/2024 9:33 AM Dictation workstation:   [id number]    XR chest 1 view    Result Date: 3/14/2024  Interpreted By:  Omari Gutierrez,  and Coco Macias  STUDY: XR CHEST 1 VIEW;  3/13/2024 11:33 pm; 3/13/2024 11:57 pm; 3/13/2024 11:59 pm; 3/13/2024 11:43 pm; 3/13/2024 11:56 pm; 3/13/2024 11:58 pm   INDICATION: Signs/Symptoms:SCG placement check; Signs/Symptoms:SGC placement check.   COMPARISON: 03/13/2024 at 10:41 a.m.   ACCESSION NUMBER(S): [id number]; [id number]; [id number]; [id number]; [id number]; [id number]; [id number]   ORDERING CLINICIAN: ENRIKE WILD   FINDINGS: A series of 7 AP radiographs of the chest were performed between 11:31 PM and 11:59 PM. They are apparently obtained for assessment of [place]-Estevan catheter position. The initial image, accession number concluding with -[id number] is time-stamped 11:31 PM.  On the patient history, that image is entered out of order, and is recorded as having been obtained at 11:56 PM. The subsequent images, beginning with accession number terminating -[id number] appear to be in proper time order.   On the initial image obtained at 11:31 PM, the [place]-Estevan catheter that enters via the inferior vena cava is seen with the tip at the bifurcation of the pulmonary artery. Several subsequent images show it extending further distally within the left pulmonary artery. The final 2 images show the tip at the pulmonary artery bifurcation.   Right IJ approach central venous catheter tip projects over lower SVC. CardioMEMS device is again noted on the left as previously described.   Impella device via right axillary artery remains in satisfactory position.     CARDIOMEDIASTINAL SILHOUETTE: Cardiomediastinal silhouette is enlarged but stable in size and configuration.   LUNGS: No focal consolidation, sizeable pleural effusion or pneumothorax. Since the earlier exam performed at 10:41 AM, there has been improvement in the degree of vascular congestion and edema.   ABDOMEN: No remarkable upper abdominal findings.   BONES: No acute osseous changes.         1. Overall improvement in the appearance of the chest since the earlier exam  performed the morning of this date. 2. [place]-Estevan catheter tip ultimately lies at the bifurcation the main pulmonary artery.     I personally reviewed the images/study and I agree with the findings as stated by resident physician Dr. Gilberto Caban . This study was interpreted at [place], [place].   MACRO: None   Signed by: Omari Gutierrez 3/14/2024 9:16 AM Dictation workstation:   [id number]    XR chest 1 view    Result Date: 3/14/2024  Interpreted By:  Omari Gutierrez,  and Coco Macias STUDY: XR CHEST 1 VIEW;  3/13/2024 11:33 pm; 3/13/2024 11:57 pm; 3/13/2024 11:59 pm; 3/13/2024 11:43 pm; 3/13/2024 11:56 pm; 3/13/2024 11:58 pm   INDICATION: Signs/Symptoms:SCG placement check; Signs/Symptoms:SGC placement check.   COMPARISON: 03/13/2024 at 10:41 a.m.   ACCESSION NUMBER(S): [id number]; [id number]; [id number]; [id number]; [id number]; [id number]; [id number]   ORDERING CLINICIAN: ENRIKE WILD   FINDINGS: A series of 7 AP radiographs of the chest were performed between 11:31 PM and 11:59 PM. They are apparently obtained for assessment of [place]-Estevan catheter position. The initial image, accession number concluding with -[id number] is time-stamped 11:31 PM.  On the patient history, that image is entered out of order, and is recorded as having been obtained at 11:56 PM. The subsequent images, beginning with accession number terminating -[id number] appear to be in proper time order.   On the initial image obtained at 11:31 PM, the [place]-Estevan catheter that enters via the inferior vena cava is seen with the tip at the bifurcation of the pulmonary artery. Several subsequent images show it extending further distally within the left pulmonary artery. The final 2 images show the tip at the pulmonary artery bifurcation.   Right IJ approach central venous catheter tip projects over lower SVC. CardioMEMS device is again noted on the left as previously described.   Impella  device via right axillary artery remains in satisfactory position.     CARDIOMEDIASTINAL SILHOUETTE: Cardiomediastinal silhouette is enlarged but stable in size and configuration.   LUNGS: No focal consolidation, sizeable pleural effusion or pneumothorax. Since the earlier exam performed at 10:41 AM, there has been improvement in the degree of vascular congestion and edema.   ABDOMEN: No remarkable upper abdominal findings.   BONES: No acute osseous changes.         1. Overall improvement in the appearance of the chest since the earlier exam performed the morning of this date. 2. [place]-Estevan catheter tip ultimately lies at the bifurcation the main pulmonary artery.     I personally reviewed the images/study and I agree with the findings as stated by resident physician Dr. Gilberto Caban . This study was interpreted at [place], [place].   MACRO: None   Signed by: Omari Gutierrez 3/14/2024 9:16 AM Dictation workstation:   [id number]    XR chest 1 view    Result Date: 3/14/2024  Interpreted By:  Omari Gutierrez,  and Coco Macias STUDY: XR CHEST 1 VIEW;  3/13/2024 11:33 pm; 3/13/2024 11:57 pm; 3/13/2024 11:59 pm; 3/13/2024 11:43 pm; 3/13/2024 11:56 pm; 3/13/2024 11:58 pm   INDICATION: Signs/Symptoms:SCG placement check; Signs/Symptoms:SGC placement check.   COMPARISON: 03/13/2024 at 10:41 a.m.   ACCESSION NUMBER(S): [id number]; [id number]; [id number]; [id number]; [id number]; [id number]; [id number]   ORDERING CLINICIAN: ENRIKE WILD   FINDINGS: A series of 7 AP radiographs of the chest were performed between 11:31 PM and 11:59 PM. They are apparently obtained for assessment of [place]-Estevan catheter position. The initial image, accession number concluding with -[id number] is time-stamped 11:31 PM.  On the patient history, that image is entered out of order, and is recorded as having been obtained at 11:56 PM. The subsequent images, beginning with accession  number terminating -[id number] appear to be in proper time order.   On the initial image obtained at 11:31 PM, the [place]-Estevan catheter that enters via the inferior vena cava is seen with the tip at the bifurcation of the pulmonary artery. Several subsequent images show it extending further distally within the left pulmonary artery. The final 2 images show the tip at the pulmonary artery bifurcation.   Right IJ approach central venous catheter tip projects over lower SVC. CardioMEMS device is again noted on the left as previously described.   Impella device via right axillary artery remains in satisfactory position.     CARDIOMEDIASTINAL SILHOUETTE: Cardiomediastinal silhouette is enlarged but stable in size and configuration.   LUNGS: No focal consolidation, sizeable pleural effusion or pneumothorax. Since the earlier exam performed at 10:41 AM, there has been improvement in the degree of vascular congestion and edema.   ABDOMEN: No remarkable upper abdominal findings.   BONES: No acute osseous changes.         1. Overall improvement in the appearance of the chest since the earlier exam performed the morning of this date. 2. [place]-Estevan catheter tip ultimately lies at the bifurcation the main pulmonary artery.     I personally reviewed the images/study and I agree with the findings as stated by resident physician Dr. Gilberto Caban . This study was interpreted at [place], [place].   MACRO: None   Signed by: Omari Gutierrez 3/14/2024 9:16 AM Dictation workstation:   [id number]    XR chest 1 view    Result Date: 3/14/2024  Interpreted By:  Omari Gutierrez,  and Coco Caban Gilberto STUDY: XR CHEST 1 VIEW;  3/13/2024 11:33 pm; 3/13/2024 11:57 pm; 3/13/2024 11:59 pm; 3/13/2024 11:43 pm; 3/13/2024 11:56 pm; 3/13/2024 11:58 pm   INDICATION: Signs/Symptoms:SCG placement check; Signs/Symptoms:SGC placement check.   COMPARISON: 03/13/2024 at 10:41 a.m.   ACCESSION NUMBER(S):  [id number]; [id number]; [id number]; [id number]; [id number]; [id number]; [id number]   ORDERING CLINICIAN: ENRIKE WILD   FINDINGS: A series of 7 AP radiographs of the chest were performed between 11:31 PM and 11:59 PM. They are apparently obtained for assessment of [place]-Estevan catheter position. The initial image, accession number concluding with -[id number] is time-stamped 11:31 PM.  On the patient history, that image is entered out of order, and is recorded as having been obtained at 11:56 PM. The subsequent images, beginning with accession number terminating -[id number] appear to be in proper time order.   On the initial image obtained at 11:31 PM, the [place]-Estevan catheter that enters via the inferior vena cava is seen with the tip at the bifurcation of the pulmonary artery. Several subsequent images show it extending further distally within the left pulmonary artery. The final 2 images show the tip at the pulmonary artery bifurcation.   Right IJ approach central venous catheter tip projects over lower SVC. CardioMEMS device is again noted on the left as previously described.   Impella device via right axillary artery remains in satisfactory position.     CARDIOMEDIASTINAL SILHOUETTE: Cardiomediastinal silhouette is enlarged but stable in size and configuration.   LUNGS: No focal consolidation, sizeable pleural effusion or pneumothorax. Since the earlier exam performed at 10:41 AM, there has been improvement in the degree of vascular congestion and edema.   ABDOMEN: No remarkable upper abdominal findings.   BONES: No acute osseous changes.         1. Overall improvement in the appearance of the chest since the earlier exam performed the morning of this date. 2. [place]-Estevan catheter tip ultimately lies at the bifurcation the main pulmonary artery.     I personally reviewed the images/study and I agree with the findings as stated by resident physician Dr. Gilberto Caban . This study was interpreted at [place]  [place], [place].   MACRO: None   Signed by: Omari Gutierrez 3/14/2024 9:16 AM Dictation workstation:   [id number]    XR chest 1 view    Result Date: 3/14/2024  Interpreted By:  Omari Gutierrez,  Nissa Macias STUDY: XR CHEST 1 VIEW;  3/13/2024 11:33 pm; 3/13/2024 11:57 pm; 3/13/2024 11:59 pm; 3/13/2024 11:43 pm; 3/13/2024 11:56 pm; 3/13/2024 11:58 pm   INDICATION: Signs/Symptoms:SCG placement check; Signs/Symptoms:SGC placement check.   COMPARISON: 03/13/2024 at 10:41 a.m.   ACCESSION NUMBER(S): [id number]; [id number]; [id number]; [id number]; [id number]; [id number]; [id number]   ORDERING CLINICIAN: ENRIKE WILD   FINDINGS: A series of 7 AP radiographs of the chest were performed between 11:31 PM and 11:59 PM. They are apparently obtained for assessment of [place]-Estevan catheter position. The initial image, accession number concluding with -[id number] is time-stamped 11:31 PM.  On the patient history, that image is entered out of order, and is recorded as having been obtained at 11:56 PM. The subsequent images, beginning with accession number terminating [id number] appear to be in proper time order.   On the initial image obtained at 11:31 PM, the [place]-Estevan catheter that enters via the inferior vena cava is seen with the tip at the bifurcation of the pulmonary artery. Several subsequent images show it extending further distally within the left pulmonary artery. The final 2 images show the tip at the pulmonary artery bifurcation.   Right IJ approach central venous catheter tip projects over lower SVC. CardioMEMS device is again noted on the left as previously described.   Impella device via right axillary artery remains in satisfactory position.     CARDIOMEDIASTINAL SILHOUETTE: Cardiomediastinal silhouette is enlarged but stable in size and configuration.   LUNGS: No focal consolidation, sizeable pleural effusion or pneumothorax. Since the earlier exam performed at 10:41 AM,  there has been improvement in the degree of vascular congestion and edema.   ABDOMEN: No remarkable upper abdominal findings.   BONES: No acute osseous changes.         1. Overall improvement in the appearance of the chest since the earlier exam performed the morning of this date. 2. [place]-Estevan catheter tip ultimately lies at the bifurcation the main pulmonary artery.     I personally reviewed the images/study and I agree with the findings as stated by resident physician Dr. Gilberto Caban . This study was interpreted at [place], [place].   MACRO: None   Signed by: Omari Gutierrez 3/14/2024 9:16 AM Dictation workstation:   [id number]    XR chest 1 view    Result Date: 3/14/2024  Interpreted By:  Omari Gutierrez,  and Coco Macias STUDY: XR CHEST 1 VIEW;  3/13/2024 11:33 pm; 3/13/2024 11:57 pm; 3/13/2024 11:59 pm; 3/13/2024 11:43 pm; 3/13/2024 11:56 pm; 3/13/2024 11:58 pm   INDICATION: Signs/Symptoms:SCG placement check; Signs/Symptoms:SGC placement check.   COMPARISON: 03/13/2024 at 10:41 a.m.   ACCESSION NUMBER(S): [id number]; [id number]; [id number]; [id number]; [id number]; [id number]; [id number]   ORDERING CLINICIAN: ENRIKE WILD   FINDINGS: A series of 7 AP radiographs of the chest were performed between 11:31 PM and 11:59 PM. They are apparently obtained for assessment of Crescent-Estevan catheter position. The initial image, accession number concluding with -[id number] is time-stamped 11:31 PM.  On the patient history, that image is entered out of order, and is recorded as having been obtained at 11:56 PM. The subsequent images, beginning with accession number terminating [id number] appear to be in proper time order.   On the initial image obtained at 11:31 PM, the [place]-Estevan catheter that enters via the inferior vena cava is seen with the tip at the bifurcation of the pulmonary artery. Several subsequent images show it extending further distally within the  left pulmonary artery. The final 2 images show the tip at the pulmonary artery bifurcation.   Right IJ approach central venous catheter tip projects over lower SVC. CardioMEMS device is again noted on the left as previously described.   Impella device via right axillary artery remains in satisfactory position.     CARDIOMEDIASTINAL SILHOUETTE: Cardiomediastinal silhouette is enlarged but stable in size and configuration.   LUNGS: No focal consolidation, sizeable pleural effusion or pneumothorax. Since the earlier exam performed at 10:41 AM, there has been improvement in the degree of vascular congestion and edema.   ABDOMEN: No remarkable upper abdominal findings.   BONES: No acute osseous changes.         1. Overall improvement in the appearance of the chest since the earlier exam performed the morning of this date. 2. [place]-Estevan catheter tip ultimately lies at the bifurcation the main pulmonary artery.     I personally reviewed the images/study and I agree with the findings as stated by resident physician Dr. Gilberto Caban . This study was interpreted at [place], [place].   MACRO: None   Signed by: Omari Gutierrez 3/14/2024 9:16 AM Dictation workstation:   [id number]    XR chest 1 view    Result Date: 3/14/2024  Interpreted By:  Omari Gutierrez,  and Coco Macias STUDY: XR CHEST 1 VIEW;  3/13/2024 11:33 pm; 3/13/2024 11:57 pm; 3/13/2024 11:59 pm; 3/13/2024 11:43 pm; 3/13/2024 11:56 pm; 3/13/2024 11:58 pm   INDICATION: Signs/Symptoms:SCG placement check; Signs/Symptoms:SGC placement check.   COMPARISON: 03/13/2024 at 10:41 a.m.   ACCESSION NUMBER(S): [id number]; [id number]; [id number]; [id number]; [id number]; [id number]; [id number]   ORDERING CLINICIAN: ENRIKE WILD   FINDINGS: A series of 7 AP radiographs of the chest were performed between 11:31 PM and 11:59 PM. They are apparently obtained for assessment of [place]-Estevan catheter position. The  initial image, accession number concluding with -[id number] is time-stamped 11:31 PM.  On the patient history, that image is entered out of order, and is recorded as having been obtained at 11:56 PM. The subsequent images, beginning with accession number terminating [id number] appear to be in proper time order.   On the initial image obtained at 11:31 PM, the [place]-Estevan catheter that enters via the inferior vena cava is seen with the tip at the bifurcation of the pulmonary artery. Several subsequent images show it extending further distally within the left pulmonary artery. The final 2 images show the tip at the pulmonary artery bifurcation.   Right IJ approach central venous catheter tip projects over lower SVC. CardioMEMS device is again noted on the left as previously described.   Impella device via right axillary artery remains in satisfactory position.     CARDIOMEDIASTINAL SILHOUETTE: Cardiomediastinal silhouette is enlarged but stable in size and configuration.   LUNGS: No focal consolidation, sizeable pleural effusion or pneumothorax. Since the earlier exam performed at 10:41 AM, there has been improvement in the degree of vascular congestion and edema.   ABDOMEN: No remarkable upper abdominal findings.   BONES: No acute osseous changes.         1. Overall improvement in the appearance of the chest since the earlier exam performed the morning of this date. 2. [place]-Estevan catheter tip ultimately lies at the bifurcation the main pulmonary artery.     I personally reviewed the images/study and I agree with the findings as stated by resident physician Dr. Gilberto Caban . This study was interpreted at [facility], [place], [place].   MACRO: None   Signed by: Omari Gutierrez 3/14/2024 9:16 AM Dictation workstation:   [id number]    Upper extremity venous duplex bilateral    Result Date: 3/13/2024            [place] [address]   Tel  [phone] and Fax [phone]  Vascular Lab Report [facility] US UPPER EXTREMITY VENOUS DUPLEX BILATERAL  Patient Name:     MIGUEL DIMAS      Reading           [id number] Rhianna BASS                Physician: Study Date:       3/12/2024            Ordering          [id number] CLARICE WILL                                        Physician:        VICKIE MRN/PID:          [id number]             Technologist:     Chio Amaro RVT Accession#:       [id number]         Technologist 2: Date of           1990 / 34 years  Encounter#:       [id number] Birth/Age: Gender:           F Admission Status: Inpatient            Location          [place] [facility]                                        Performed:  Diagnosis/ICD: Localized (leg) edema-R60.0 CPT Codes:     55625 Peripheral venous duplex scan for DVT complete  **CRITICAL RESULT** Critical Result: New finding: 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  CONCLUSIONS: Right Upper Venous: No evidence of acute deep vein thrombus visualized in the right upper extremity. There is acute occlusive superficial venous thrombosis visualized in the mid cephalic vein. There are chronic changes visualized in the internal jugular vein. Line and Impella device in subclavian vein. Additional Findings; IV Line. Left Upper Venous: There is acute non-occlusive deep vein thrombosis visualized in the internal jugular vein. Acute superficial vein thrombosis in the mid cephalic vein.  Comparison: Compared with study from 3/4/2024, there are chronic changes in the right internal jugular vein.  Imaging & Doppler Findings:  Right               Compressible    Thrombus       Flow Internal Jugular      Partial        Chronic     Pulsatile Subclavian                                       Pulsatile Subclavian Proximal                   None Subclavian Mid          Yes            None       Pulsatile Subclavian Distal       Yes           None       Pulsatile Axillary                Yes           None       Pulsatile Brachial                Yes           None Cephalic                 No      Acute occlusive Basilic                 Yes           None  Left                Compress      Thrombus         Flow Internal Jugular    Partial  Acute non-occlusive Pulsatile Subclavian Proximal                 None         Pulsatile Subclavian Mid        Yes           None         Pulsatile Subclavian Distal     Yes           None         Pulsatile Axillary              Yes           None         Pulsatile Brachial              Yes           None Cephalic               No      Acute occlusive Basilic               Yes           None  [id number] Rhianna Dumont MD Electronically signed by [id number] Rhianna Dumont MD on 3/13/2024 at 9:04:03 PM  ** Final **     Lower extremity venous duplex bilateral    Result Date: 3/13/2024            [place] [address]   Tel [phone] and Fax [phone]  Vascular Lab Report [facility]ASC US LOWER EXTREMITY VENOUS DUPLEX BILATERAL  Patient Name:     MIGUEL DIMAS      Reading           [id number] Rhianna Dumont MD                   [facility]                Physician: Study Date:       3/12/2024            Ordering          [id number] CLARICE WILL                                        Physician:        VICKIE MRN/PID:          [id number]             Technologist:     Karley Vines T Accession#:       [id number]         Technologist 2: Date of           1990 / 34 years  Encounter#:       [id number] Birth/Age: Gender:           F Admission Status: Outpatient           Location          [facility]                                        Performed:  Diagnosis/ICD: Localized (leg) edema-R60.0 CPT Codes:     86781 Peripheral venous duplex scan for DVT complete  CONCLUSIONS: Right Lower Venous: No evidence of acute deep vein thrombus  visualized in the right lower extremity. Left Lower Venous: No evidence of acute deep vein thrombus visualized in the left lower extremity. Cystic structure noted in left groin measuring 1.79 cm x 1.08 cm.  Imaging & Doppler Findings:  Right                 Compressible Thrombus        Flow Distal External Iliac     Yes        None       Pulsatile CFV                       Yes        None       Pulsatile PFV                       Yes        None FV Proximal               Yes        None       Pulsatile FV Mid                    Yes        None FV Distal                 Yes        None Popliteal                 Yes        None   Spontaneous/Phasic Peroneal                  Yes        None PTV                       Yes        None  Left                  Compress Thrombus        Flow Distal External Iliac   Yes      None       Pulsatile CFV                     Yes      None       Pulsatile PFV                     Yes      None FV Proximal             Yes      None       Pulsatile FV Mid                  Yes      None FV Distal               Yes      None Popliteal               Yes      None   Spontaneous/Phasic Peroneal                Yes      None PTV                     Yes      None  [id number] Rhianna Dumont MD Electronically signed by [id number] Rhianna Dumont MD on 3/13/2024 at 8:59:50 PM  ** Final **     XR chest 1 view    Result Date: 3/13/2024  Interpreted By:  Omari Gutierrez, STUDY: XR CHEST 1 VIEW;  3/13/2024 7:42 am   INDICATION: Signs/Symptoms:PA catheter - ADHF.   COMPARISON: 03/11/2024   ACCESSION NUMBER(S): [id number]   ORDERING CLINICIAN: KONSTANTIN PERKINS   FINDINGS: AP radiograph of the chest was provided.   Impella device entering via the right axillary artery remains in good position. Central venous catheter entering via the right jugular vein is unchanged. CardioMEMS device is visible on the left as previously noted.   CARDIOMEDIASTINAL SILHOUETTE: Cardiomediastinal silhouette is enlarged but stable in size  and configuration.   LUNGS: There is slight prominence of the pulmonary vascularity compared to the previous study that may indicate mild overload. Slight accentuation of the interstitial markings suggest mild edema.   ABDOMEN: No remarkable upper abdominal findings.   BONES: No acute osseous changes.       1.  Findings suggesting mild overload and developing edema.       MACRO: None   Signed by: Omari Gutierrez 3/13/2024 4:51 PM Dictation workstation:   [id number]    Cardiac Catheterization Procedure    Result Date: 3/13/2024  Recommendations:     Cardiac Catheterization Procedure    Result Date: 3/13/2024   [facility], Cath Lab, [address] [place] [address] Cardiovascular Catheterization Report Patient Name:      MIGUEL NAILSSIMONE VILAENS Performing Physician:  [id number] Orellana MD Study Date:        3/13/2024             Verifying Physician:   Priya Orellana MD MRN/PID:           [id number]              Cardiologist/Co-scrub: Accession#:        [id number]          Ordering Provider:     [id number] CLARICE JOHNSTON Date of Birth/Age: 1990 / 34 years   Fellow: Gender:            F                     Fellow: Encounter#:        [id number]  Study: Right Heart Cath  Indications: Heart failure and cardiomyopathy. Heart failure.  Procedure Description: After infiltration with 2% Lidocaine, the was cannulated with a modified Seldinger technique. After infiltration of local anesthetic, the right femoral vein was identified with two-dimensional ultrasound. Under direct ultrasound visualization, the right femoral vein was cannulated with a micropuncture technique. A 9 [place] citizen sheath was placed in the vein. A balloon tipped catheter was advanced through the right heart to record pressures. Cardiac output was  calculated via the Shreyas method.  Right Heart Catheterization: A balloon tipped catheter was advanced through the right heart to record pressures. Cardiac output was calculated via the Shreyas method. Elevated left sided filling pressures with normal cardiac output. Elevated ventricular filling pressure. Cardiac output is normal. No evidence of shunt. No pulmonary vascular resistance.  Hemo Personnel: +---------------+---------+ Name           Duty      +---------------+---------+ Sourav Orellana MD 1 +---------------+---------+  Hemodynamic Pressures:  +----+----------+---------+-------------+-------------+---+----+-------+-------+ SiteDate Time   Phase  Systolic mmHg  Diastolic  ED MeanA-Wave V-Wave                  Name                    mmHg     mmHmmHg mmHg   mmHg                                                     g                    +----+----------+---------+-------------+-------------+---+----+-------+-------+   AO 3/13/2024     Rest          125           86     97                   9:16:03 AM                                                         +----+----------+---------+-------------+-------------+---+----+-------+-------+   RA 3/13/2024     Rest                               16     23     22     9:32:42 AM                                                         +----+----------+---------+-------------+-------------+---+----+-------+-------+   RV 3/13/2024     Rest           43            1 14                       9:35:09 AM                                                         +----+----------+---------+-------------+-------------+---+----+-------+-------+   PW 3/13/2024     Rest                               23     25     31     9:36:57 AM                                                         +----+----------+---------+-------------+-------------+---+----+-------+-------+   PA  3/13/2024     Rest           43           12     28                   9:37:20 AM                                                         +----+----------+---------+-------------+-------------+---+----+-------+-------+  Oxygen Saturation %: +-----------+----------+------------+ Sample SiteO2 Sat (%)HB (g/100ml) +-----------+----------+------------+          FA        96         7.4 +-----------+----------+------------+          RA        46         7.4 +-----------+----------+------------+          FA        96         7.4 +-----------+----------+------------+          PA        47         7.4 +-----------+----------+------------+  Cardiac Outputs: +---------------+------------------+-------+ LISA CO (l/min)LISA CI (l/min/m2)LISA SV +---------------+------------------+-------+             5.1               2.6   38.6 +---------------+------------------+-------+  Vascular Resistance Calculated Values (Wood Units): +-----+---+----+-------+----+----+---+----+----+----+-------+ PhasePVRPVRIPVR/SVRSVR SVRITPRTPRITVR TVRITPR/TVR +-----+---+----+-------+----+----+---+----+----+----+-------+ 0    1.01.8 0      [id number]       +-----+---+----+-------+----+----+---+----+----+----+-------+  Complications: No in-lab complications observed.  Cardiac Cath Post Procedure Notes: Post Procedure Diagnosis: Heart failure. Blood Loss:               Estimated blood loss during the procedure was 0 mls. Specimens Removed:        Number of specimen(s) removed: none.  Recommendations: Maximize medical therapy. ____________________________________________________________________________________ CONCLUSIONS:  1. Acute on chronic decompensated systolic heart failure. ICD 10 Codes: Acute systolic (congestive) heart failure (CHF)-I50.21  CPT Codes: Right Heart Cath O2/Cardiac output without biopsy (RHC)-12792  [id number] Sourav Orellana MD Performing Physician  Electronically signed by [id number] Sourav Orellana MD on 3/13/2024 at 3:12:03 PM  ** Final **     XR chest 1 view    Result Date: 3/13/2024  Interpreted By:  Omari Gutierrez, STUDY: XR CHEST 1 VIEW;  3/13/2024 10:41 am   INDICATION: Signs/Symptoms:SGC positioning.   COMPARISON: 03/13/2024 at 7:26 a.m.   ACCESSION NUMBER(S): [id number]   ORDERING CLINICIAN: JOANNA KEANE   FINDINGS: AP radiograph of the chest performed at 10:34 a.m. was provided.   A [place]-Estevan catheter has been placed via the inferior vena cava. The tip of the catheter appears to lie within the main pulmonary artery. I am uncertain whether it is directed toward the left or the right pulmonary artery.   Impella device entering via the right axillary artery is again noted and appears in satisfactory position. Right jugular venous catheter tip is unchanged in position. CardioMEMS device is again visible on the left.   CARDIOMEDIASTINAL SILHOUETTE: Cardiomediastinal silhouette is large but stable in size and configuration.   LUNGS: There is increasing prominence of the pulmonary vascularity suggesting increased overload. Accentuation of the interstitial markings consistent with edema also appears to have increased slightly in the interim.   ABDOMEN: No remarkable upper abdominal findings.   BONES: No acute osseous changes.       1.  [place]-Estevan catheter placement as described above. Increasing vascular congestion and edema.       MACRO: None   Signed by: Omari Gutierrez 3/13/2024 11:16 AM Dictation workstation:   [id number]    CT chest wo IV contrast    Result Date: 3/12/2024  Interpreted By:  Alton Piper, STUDY: CT CHEST WO IV CONTRAST;  3/7/2024 10:07 am   INDICATION: Signs/Symptoms:heart transplant workup.   COMPARISON: 2 02/2022   ACCESSION NUMBER(S): [id number]   ORDERING CLINICIAN: BEULAH MCGOVERN   TECHNIQUE: Helical data acquisition of the chest was obtained  without IV contrast material.  Images were reformatted in axial, coronal, and  sagittal planes.   FINDINGS: LUNGS AND AIRWAYS: The trachea and central airways are patent. No endobronchial lesion.   Evaluation of the lung parenchyma demonstrates left basilar atelectasis and small left-sided pleural effusion. There are no suspicious lung nodules.   MEDIASTINUM AND YANE, LOWER NECK AND AXILLA: The visualized thyroid gland is within normal limits.   Scattered mediastinal lymph nodes are felt to be reactive in nature.   Esophagus appears within normal limits as seen.   HEART AND VESSELS: The thoracic aorta is of normal course and caliber without vascular calcifications. Postoperative changes consistent with heart transplant. The patient is status post right subclavian Impella device placement with tip overlying the left ventricle.   Main pulmonary artery and its branches are normal in caliber.   No coronary artery calcifications are seen. The study is not optimized for evaluation of coronary arteries.   There is global chamber enlargement. Left ventricular Impella device in place.   No evidence of pericardial effusion.   UPPER ABDOMEN: Contrast in nondistended loops of bowel.   CHEST WALL AND OSSEOUS STRUCTURES: There are no suspicious osseous lesions. Multilevel degenerative changes are present. There is right axillary soft tissue edema with axillary subcutaneous emphysema consistent with recent Impella device placement. Right axillary clips in place consistent with recent Impella device placement.   Patient is status post median sternotomy.       1.  Postoperative changes consistent with previous heart transplant, Impella device placement and median sternotomy. 2. Postoperative changes with right axillary soft tissue/fluid consistent with recent Impella device placement. 3. Mild bibasilar atelectasis with small left-sided effusion.   MACRO: None   Signed by: Alton Piper 3/12/2024 10:24 AM Dictation workstation:   [id number]    Transthoracic Echo (TTE) Limited    Result Date: 3/11/2024    [facility], [address] [place] [address]                Tel [phone] and Fax [phone] TRANSTHORACIC ECHOCARDIOGRAM REPORT  Patient Name:      MIGUEL DIMAS      Jax Physician:    [id number] Patrica BASS MD Study Date:        3/11/2024            Ordering Provider:    [id number] MERLINE JACKSON                                                               HEAD MRN/PID:           [id number]             Fellow: Accession#:        [id number]         Nurse: Date of Birth/Age: 1990 / 34 years  Sonographer:          Kingsley Morrell                                                               [facility] Gender:            F                    Additional Staff: Height:            154.94 cm            Admit Date:           2/15/2024 Weight:            97.01 kg             Admission Status:     Inpatient -                                                               Routine BSA / BMI:         1.94 m2 / 40.41      Encounter#:           [id number]                    kg/m2                                         Department Location:  [address] Blood Pressure: 96 /7 mmHg Study Type:    TRANSTHORACIC ECHO (TTE) LIMITED Diagnosis/ICD: Cardiogenic shock-R57.0 Indication:    Shock, s/p right heart cath, limited for function & MR CPT Code:      Echo Limited-68421; Echo Complete w Full Doppler-31188; Doppler                Limited-15633 Patient History: Pertinent History: 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 Study Detail: The following Echo studies were performed: 2D, M-Mode, Doppler and               color flow. Technically challenging study due to body habitus and               poor acoustic windows. Definity used as a contrast agent for               endocardial border definition. Total contrast used for this               procedure was 1.5 mL via IV push.  PHYSICIAN INTERPRETATION:  Left Ventricle: The left ventricular systolic function is moderately to severely decreased, with an estimated ejection fraction of 25-30%. There is global hypokinesis of the left ventricle with minor regional variations. The left ventricular cavity size is normal. The left ventricular septal wall thickness is mildly increased. There is mildly increased left ventricular posterior wall thickness. Left ventricular diastolic filling was indeterminate. Left Atrium: The left atrium is mildly dilated. Right Ventricle: The right ventricle is normal in size. There is reduced right ventricular systolic function. Right Atrium: The right atrium is mildly dilated. Aortic Valve: The aortic valve was not well visualized. There is indeterminate aortic valve regurgitation. Mitral Valve: The mitral valve is normal in structure. There is moderate mitral valve regurgitation. Tricuspid Valve: The tricuspid valve is structurally normal. Tricuspid regurgitation was not assessed. Pulmonic Valve: The pulmonic valve is not well visualized. Pulmonic valve regurgitation was not assessed. Pericardium: There is a trivial to small pericardial effusion. Aorta: The aortic root was not well visualized. In comparison to the previous echocardiogram(s): Compared with the prior exam from 3/4/2024 there was already an Impella LVAD in place. The LV systolic function appears similar or slightly less dynamic today. The MR appears to still be moderat. The RVSP was not assessed today.  LEFT VENTRICULAR ASSIST DEVICE: LVAD: The patient has a(n) Impella LVAD device present. LVAD Comments: There is an Impella in placve which is angled posteriorly within the LV cavity.  CONCLUSIONS:  1. Left ventricular systolic function is moderately to severely decreased with a 25-30% estimated ejection fraction.  2. There is reduced right ventricular systolic function.  3. Moderate mitral valve regurgitation.  4. Compared with the prior exam from 3/4/2024 there was already an  Impella LVAD in place. The LV systolic function appears similar or slightly less dynamic today. The MR appears to still be moderat. The RVSP was not assessed today.  5. There is global hypokinesis of the left ventricle with minor regional variations. QUANTITATIVE DATA SUMMARY: 2D MEASUREMENTS:                           Normal Ranges: IVSd:          1.40 cm    (0.6-1.1cm) LVPWd:         1.30 cm    (0.6-1.1cm) LVIDd:         4.40 cm    (3.9-5.9cm) LV Mass Index: 117.0 g/m2 LA VOLUME:                               Normal Ranges: LA Vol A4C:        67.6 ml    (22+/-6mL/m2) LA Vol A2C:        83.8 ml LA Vol BP:         75.8 ml LA Vol Index A4C:  34.8ml/m2 LA Vol Index A2C:  43.1 ml/m2 LA Vol Index BP:   39.0 ml/m2 LA Area A4C:       24.1 cm2 LA Area A2C:       27.0 cm2 LA Major Axis A4C: 7.3 cm LA Major Axis A2C: 7.4 cm LA Volume Index:   39.1 ml/m2 RA VOLUME BY A/L METHOD:                       Normal Ranges: RA Area A4C: 19.0 cm2 LV DIASTOLIC FUNCTION:                        Normal Ranges: MV Peak E:    1.23 m/s (0.7-1.2 m/s) MV e'         0.11 m/s (>8.0) MV lateral e' 0.16 m/s MV medial e'  0.06 m/s E/e' Ratio:   11.18    (<8.0) MITRAL VALVE:                 Normal Ranges: MV DT: 108 msec (150-240msec)  RIGHT VENTRICLE: RV Basal 4.10 cm RV Mid   3.00 cm RV Major 7.3 cm RV s'    0.09 m/s  [id number] Patrica Aguilera MD Electronically signed on 3/11/2024 at 6:17:02 PM  ** Final **     XR chest 1 view    Result Date: 3/11/2024  Interpreted By:  Omari Gutierrez, STUDY: XR CHEST 1 VIEW;  3/11/2024 7:47 am   INDICATION: Signs/Symptoms:daily.   COMPARISON: 03/10/2024   ACCESSION NUMBER(S): [id number]   ORDERING CLINICIAN: BEULAH BOUCHAHINE   FINDINGS: AP radiograph of the chest was provided.   Impella device entering via the right axillary artery remains unchanged in satisfactory position. Central venous catheter entering via the right jugular vein is unchanged in position. CardioMEMS device is again visible on the left.    CARDIOMEDIASTINAL SILHOUETTE: Cardiomediastinal silhouette is stable in size and configuration.   LUNGS: Minimal basal atelectasis on the left side is again noted. The lungs show no other areas of opacity. Minimal left pleural fluid collection is seen.   ABDOMEN: No remarkable upper abdominal findings.   BONES: No acute osseous changes.       1.  Essentially stable appearance of the chest.       MACRO: None   Signed by: Omari Gutierrez 3/11/2024 12:17 PM Dictation workstation:   [id number]    Pulmonary function testing    Result Date: 3/11/2024  The FEV1/FVC is normal. The FEV1 is mildly reduced. The FVC is moderately reduced, suggesting restrictive impairment.   However, the presence of restriction should be confirmed by measurement of lung volumes. Conclusion: Spirometry shows no obstruction, however it suggests a possible restrictive ventilatory impairment or non-specific pattern. Recommend obtaining lung volumes to confirm.    XR chest 1 view    Result Date: 3/10/2024  Interpreted By:  Alton Piper, STUDY: XR CHEST 1 VIEW; 3/10/2024 4:14 am   INDICATION: Signs/Symptoms:daily.   COMPARISON: 03/09/2024.   ACCESSION NUMBER(S): [id number]   ORDERING CLINICIAN: BEULAH MCGOVERN   FINDINGS: Right IJ line in place.   CARDIOMEDIASTINAL SILHOUETTE: Patient is status post median sternotomy. Impella device in place. CardioMEMS device overlies the left pulmonary artery.   LUNGS: No focal airspace disease. Minimal left basilar atelectasis.   ABDOMEN: No remarkable upper abdominal findings.   BONES: No acute osseous changes.       1.  Impella device in place. 2. No focal airspace disease.     Signed by: Alton Piper 3/10/2024 10:55 AM Dictation workstation:   [id number]    XR chest 1 view    Result Date: 3/9/2024  Interpreted By:  Alton Piper, STUDY: XR CHEST 1 VIEW; 3/9/2024 8:37 am   INDICATION: Signs/Symptoms:daily.   COMPARISON: 03/08/2024.   ACCESSION NUMBER(S): [id number]   ORDERING CLINICIAN: BEULAH  FROILAN   FINDINGS:     CARDIOMEDIASTINAL SILHOUETTE: Patient is status post median sternotomy. Impella device in place. Cardiomegaly versus pericardial effusion.   LUNGS: Lungs are clear of focal airspace disease or edema. No pneumothorax.   ABDOMEN: No remarkable upper abdominal findings.   BONES: No acute osseous changes.       1.  Impella device in place. No focal airspace disease.     Signed by: Alton Piper 3/9/2024 9:32 AM Dictation workstation:   [id number]    XR chest 1 view    Result Date: 3/9/2024  Interpreted By:  Alton Piper, STUDY: XR CHEST 1 VIEW; 3/8/2024 4:09 am   INDICATION: Signs/Symptoms:daily.   COMPARISON: 03/07/2024.   ACCESSION NUMBER(S): [id number]   ORDERING CLINICIAN: BEULAH MCGOVERN   FINDINGS:     CARDIOMEDIASTINAL SILHOUETTE: Patient is status post median sternotomy Impella in place overlying the left ventricle. Right IJ catheter overlies the SVC. CardioMEMS device overlies the left pulmonary artery.   LUNGS: Lungs are clear of focal airspace disease or edema. No pneumothorax.   ABDOMEN: No remarkable upper abdominal findings.   BONES: No acute osseous changes.       1.  Life-support systems in satisfactory position. No focal airspace disease.     Signed by: Alton Piper 3/9/2024 9:31 AM Dictation workstation:   [id number]    Vascular US Ankle Brachial Index (KATHIE) Without Exercise    Result Date: 3/8/2024            [place] [address]   Tel [phone] and Fax [phone]  Vascular Lab Report [facility] US ANKLE BRACHIAL INDEX (KATHIE) WITHOUT EXERCISE  Patient Name:      MIGUEL REGINALD Camara Physician:  [id number] Robert Santiago MD, RPVI Study Date:        3/8/2024              Ordering Physician: [id number] BEULAH MCGOVERN MRN/PID:           [id number]              Technologist:       Kati Wilde                                                               T Accession#:        [id number]          Technologist 2: Date of Birth/Age: 1990 / 34 years   Encounter#:         [id number] Gender:            F Admission Status:  Inpatient             Location Performed: [facility]  Diagnosis/ICD: Peripheral vascular disease, unspecified-I73.9 Indication:    Transplant cardiomyopthy CPT Codes:     91321 Peripheral artery KATHIE Only  Patient History Cardiac assist device.  CONCLUSIONS: Right Lower PVR: No evidence of arterial occlusive disease in the right lower extremity at rest. Normal digital perfusion noted. Triphasic flow is noted in the right common femoral artery, right posterior tibial artery and right dorsalis pedis artery. Left Lower PVR: No evidence of arterial occlusive disease in the left lower extremity at rest. Normal digital perfusion noted. Triphasic flow is noted in the left posterior tibial artery and left dorsalis pedis artery. Right groin image not saved due to archiving error, was triphasic.  Comparison: Compared with study from 2/3/2022, no significant change.  Imaging & Doppler Findings:  RIGHT Lower PVR                Pressures Ratios Right Posterior Tibial (Ankle) 110 mmHg  1.02 Right Dorsalis Pedis (Ankle)   108 mmHg  1.00 Right Digit (Great Toe)        105 mmHg  0.97   LEFT Lower PVR                Pressures Ratios Left Posterior Tibial (Ankle) 98 mmHg   0.91 Left Dorsalis Pedis (Ankle)   97 mmHg   0.90 Left Digit (Great Toe)        87 mmHg   0.81                     Right     Left Brachial Pressure 107 mmHg 108 mmHg   [id number] Santiago MD, RPVI Electronically signed by [id number] Santiago MD, RPVI on 3/8/2024 at 11:44:19 AM  ** Final **     Vascular US carotid artery duplex bilateral    Result Date: 3/7/2024            [place] [address]   Tel [phone] and Fax  [phone]  Vascular Lab Report [facility] US CAROTID ARTERY DUPLEX BILATERAL  Patient Name:      MIGUEL BASS Reading Physician:  [id number] Robert Santiago MD, RPVI Study Date:        3/7/2024              Ordering Physician: [id number] BEULAH MCGOVERN MRN/PID:           [id number]              Technologist:       CT Accession#:        [id number]          Technologist 2: Date of Birth/Age: 1990 / 34 years   Encounter#:         [id number] Gender:            F Admission Status:  Inpatient             Location Performed: [facility]  Diagnosis/ICD: Encounter for other preprocedural examination-Z01.818 CPT Codes:     85639 Cerebrovascular Carotid Duplex scan complete  CONCLUSIONS: Left Carotid: Findings are consistent with less than 50% stenosis of the left proximal internal carotid artery. Left external carotid artery appears patent with no evidence of stenosis. The left vertebral artery is patent with antegrade flow. No evidence of hemodynamically significant stenosis in the left subclavian artery.  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 Line on right side of neck. Unable to visualize right side vessles. Patient on cardiac device , irregular heart rhythm. Patient is on Cardiac device, LVAD? Velocity criteria for carotid stenosis might be equivocal inLVAD patients  Imaging & Doppler Findings: Left Plaque Morph: The proximal left internal carotid artery demonstrates homogenous and irregular plaque. The distal left common carotid artery demonstrates smooth and irregular plaque.  Right                 Left  PSV  EDV              PSV     EDV             CCA P    57 cm/s             CCA D    51 cm/s             ICA P    51 cm/s 27 cm/s             ICA M    60 cm/s 32 cm/s             ICA D    56 cm/s 24 cm/s               ECA     39 cm/s           Vertebral  20 cm/s 16 cm/s           Subclavian 31 cm/s               Left ICA/CCA Ratio 1.0   [id number] ALEXSANDER Silverio MD Electronically signed by [id number] ALEXSANDER Silverio MD on 3/7/2024 at 9:33:19 PM  ** Final **     Vascular US aorta iliac duplex limited    Result Date: 3/7/2024            [place] [address]   Tel [phone] and Fax [phone]  Vascular Lab Report [facility] US AORTA ILIAC DUPLEX LIMITED  Patient Name:      MIGUEL NAILSSIMONE BASS Reading Physician:  [id number] Aburto MD Study Date:        3/7/2024              Ordering Physician: [id number] MCGOVERN MRN/PID:           [id number]              Technologist:       Chio Amaro RVT Accession#:        [id number]          Technologist 2: Date of Birth/Age: 1990 / 34 years   Encounter#:         [id number] Gender:            F Admission Status:  Inpatient             Location Performed: [facility]  Diagnosis/ICD: Encounter for preprocedural cardiovascular examination-Z01.810 CPT Codes:     39351 Duplex Aorta/IVC/Iliac/Bypass Graft  CONCLUSIONS: Aorta/Common Iliac Arteries/IVC: The abdominal aorta and bilateral common iliac arteries demonstrate no evidence of aneurysm. There is smooth homogeneous plaque visualized proximally within the right common iliac artery. There is no evidence of hemodynamically significant stenosis in the right common iliac artery. There is smooth homogeneous plaque within the left common iliac artery. No evidence of hemodynamically significant stenosis in the left common iliac artery.  Additional Findings: Lines, dressings, and colostomy bag on abdomen. Limited access to abdomen. Patient on cardiac device. Irregular heart rhythm.   Imaging & Doppler Findings:   AORTA     AP    Lateral    PSV Proximal 2.09 cm 2.09 cm 58.4 cm/s   Mid    1.96 cm 1.96 cm 63.6 cm/s  Distal  1.61 cm 1.66 cm 58.4 cm/s    RIGHT       AP    Lateral    PSV RUTHIE Proximal 1.11 cm 1.02 cm 48.80 cm/s     LEFT       AP    Lateral    PSV RUTHIE Proximal 1.13 cm 1.21 cm 42.70 cm/s  [id number] ALEXSANDER Silverio MD Electronically signed by [id number] ALEXSANDER Silverio MD on 3/7/2024 at 5:00:01 PM  ** Final **     US abdomen    Result Date: 3/7/2024  Interpreted By:  Ash Ricks and Stephens Katherine STUDY: US ABDOMEN;  3/7/2024 4:09 pm   INDICATION: Signs/Symptoms:heart transplant workup.   COMPARISON: Renal ultrasound 02/19/2024   ACCESSION NUMBER(S): [id number]   ORDERING CLINICIAN: BEULAH MCGOVERN   TECHNIQUE: Multiple images of the abdomen were obtained.   FINDINGS: LIVER: The liver measures 16.1 cm and is grossly unremarkable and free of any focal lesions.   GALLBLADDER: The gallbladder is nondistended, and demonstrates sludge. No wall thickening or surrounding fluid. The gallbladder wall thickness is 0.2 cm. Sonographic Morrell's sign is negative.   BILE DUCTS: No evidence of intra or extrahepatic biliary dilatation is identified; the common bile duct measures 0.5 cm.   PANCREAS: The pancreas is poorly visualized due to overlying bowel gas.   RIGHT KIDNEY: The right kidney measures 10.3 cm in length. The renal cortical echogenicity and thickness are within normal limit.  No hydronephrosis or renal calculi are seen.   LEFT KIDNEY: The left kidney measures 10.2 cm in length. The renal cortical echogenicity and thickness are within normal limits. No hydronephrosis or renal calculi are seen.   SPLEEN: The spleen measures 7.7 cm and is grossly unremarkable.   URINARY BLADDER: The urinary bladder is within normal limits.   PERITONEUM: There is no free or loculated fluid seen in the abdomen.   ABDOMINAL AORTA AND IVC: The visualized portions of the aorta and IVC are  unremarkable.       Biliary sludge without evidence of acute cholecystitis. Otherwise unremarkable abdominal ultrasound.   I personally reviewed the images/study and I agree with Charity Ross DO's (radiology resident) findings as stated. This study was interpreted at [facility], [place], [place].   MACRO: None   Signed by: Ash Ricks 3/7/2024 4:37 PM Dictation workstation:   [id number]    Electrocardiogram, 12-lead PRN ACS symptoms    Result Date: 3/7/2024  Sinus tachycardia Low voltage QRS Possible Anteroseptal infarct Possible Lateral infarct Abnormal ECG Confirmed by Kei Lozano ([id number]) on 3/7/2024 3:01:50 PM    ECG 12 lead    Result Date: 3/7/2024   Poor data quality, interpretation may be adversely affected Sinus tachycardia with occasional Premature ventricular complexes Low voltage QRS Anterior infarct Abnormal ECG Confirmed by Kei Lozano ([id number]) on 3/7/2024 3:01:17 PM    Lower extremity venous duplex bilateral    Result Date: 3/7/2024            [place] [address]   Tel [phone] and Fax [phone]  Vascular Lab Report [facility] US LOWER EXTREMITY VENOUS DUPLEX BILATERAL  Patient Name:     MIGUEL DIMAS      Reading           [id number] Carolina BASS                Physician:        MD Study Date:       3/6/2024             Ordering          [id number] ODILON RAYO                                        Physician:        GINA MRN/PID:          [id number]             Technologist:     Sue Hammond S Accession#:       [id number]         Technologist 2: Date of           1990 / 34 years  Encounter#:       [id number] Birth/Age: Gender:           F Admission Status: Inpatient            Location          [facility]                                        Performed:  Diagnosis/ICD: Generalized edema (anasarca)-R60.1 CPT Codes:     70873 Peripheral venous duplex scan for DVT  complete  Patient History PE. Heart transplant patient and now on list for second heart                 transplant.                 Patient has impella                 Recent ECMO.  CONCLUSIONS: Right Lower Venous: No evidence of acute deep vein thrombus visualized in the right lower extremity. Left Lower Venous: No evidence of acute deep vein thrombus visualized in the left lower extremity. Cannot rule out thrombus in non-visualized common femoral and iliac veins due to dressings. Recent ECMO left groin. Unable to visualize left distal external iliac vein and left CFV.  Additional Findings: Left FV- pop vein continous waveforms maybe suggestive of more proximal occlusion or compression. Technically difficult exam due impella and recent ECMO.  Imaging & Doppler Findings:  Right                 Compressible Thrombus        Flow Distal External Iliac     Yes        None   Spontaneous/Phasic CFV                       Yes        None   Spontaneous/Phasic PFV                       Yes        None FV Proximal               Yes        None   Spontaneous/Phasic FV Mid                    Yes        None FV Distal                 Yes        None Popliteal                 Yes        None   Spontaneous/Phasic Peroneal                  Yes        None PTV                       Yes        None  Left        Compress Thrombus    Flow PFV           Yes      None FV Proximal   Yes      None   Continuous FV Mid        Yes      None FV Distal     Yes      None Popliteal     Yes      None   Continuous Peroneal      Yes      None PTV           Yes      None  [id number] Carolina Benítez MD Electronically signed by [id number] Carolina Benítez MD on 3/7/2024 at 9:33:26 AM  ** Final **     XR chest 1 view    Result Date: 3/7/2024  Interpreted By:  Raman Roa, STUDY: XR CHEST 1 VIEW;  3/7/2024 3:49 am   INDICATION: Signs/Symptoms:daily.   COMPARISON: Chest radiograph dated 3/6/2024   ACCESSION NUMBER(S): [id number]   [facility]INIC[facility]   FINDINGS: AP radiograph of the chest   The right internal jugular catheter is in satisfactory position. The Impella device is noted unchanged in position. Sternal sutures are present. The heart is mildly enlarged. Pulmonary aeration is similar previous study.       1. No acute cardiopulmonary pathology       Signed by: Raman Roa 3/7/2024 9:27 AM Dictation workstation:   [id number]    Upper extremity venous duplex bilateral    Result Date: 3/6/2024            [place] [address]   Tel [phone] and Fax [phone]  Vascular Lab Report [facility] UPPER EXTREMITY VENOUS DUPLEX BILATERAL  Patient Name:     MIGUEL DIMAS      Reading           [address]99 Sholaarmando BASS                Physician:        MD Study Date:       3/6/2024             Ordering          [id number] ODILON RAYO                                        Physician:        GINA MRN/PID:          [id number]             Technologist:     Sue Hammond [facility] Accession#:       [id number]         Technologist 2: Date of           1990 / 34 years  Encounter#:       [id number] Birth/Age: Gender:           F Admission Status: Inpatient            Location          [facility]                                        Performed:  Diagnosis/ICD: Pain in right arm-M79.601; Pain in left arm-M79.602 Indication:    Limb pain. CPT Codes:     95933 Peripheral venous duplex scan for DVT complete  Patient History CAD and PE. Patient had heart transplant few years ago now heart                 is not working back on list foe second heart transplant.                 Recent ECMO left groin.  **CRITICAL RESULT** Critical Result: Acute DVT L IJ vein and SVT left cephalic vein and right cephalic vein Notification called to Carlee Marina on 3/6/2024 at 3:30:53 PM by Sue Hammond.  CONCLUSIONS: Right Upper Venous: There is acute occlusive superficial venous thrombosis visualized in the  mid cephalic and acute occlusive superficial venous thrombosis visualized in the distal cephalic veins. Axillary vein compression images were not saved to review. The remainder of veins in right arm are negative for deep vein thrombus. Cannot rule out thrombus of non-compressible proximal subclavian vein due to Impella. Cannot rule out thrombus of non-visualized internal jugular, distal subclavian and mid subclavian veins due to line and Impella. Left Upper Venous: There is acute non-occlusive deep vein thrombosis visualized in the internal jugular and acute non-occlusive superficial venous thrombosis visualized in the mid cephalic veins. The remainder of veins in left arm are negative for deep vein thrombus.  Additional Findings: Technically difficut exam due to impella and lines. Somehow continuos flow is noted which maybe suggestive of more proximal occlusion or compression.  Imaging & Doppler Findings:  Right               Compressible    Thrombus            Flow Subclavian                            None       Spontaneous/Phasic Subclavian Proximal                   None       Spontaneous/Phasic Subclavian Distal                     None Axillary                Yes           None       Spontaneous/Phasic Brachial                Yes           None Cephalic                 No      Acute occlusive Basilic                 Yes           None  Left                Compress      Thrombus              Flow Internal Jugular    Partial  Acute non-occlusive Spontaneous/Phasic Subclavian            Yes           None         Spontaneous/Phasic Subclavian Proximal   Yes           None         Spontaneous/Phasic Subclavian Mid        Yes           None         Spontaneous/Phasic Subclavian Distal     Yes           None         Spontaneous/Phasic Axillary              Yes           None         Spontaneous/Phasic Brachial              Yes           None Cephalic            Partial  Acute non-occlusive     Continuous Basilic                Yes           None  [id number] Carolina Benítez MD Electronically signed by [id number] Carolina Benítez MD on 3/6/2024 at 4:20:11 PM  ** Final **     XR chest 1 view    Result Date: 3/6/2024  Interpreted By:  Raman Roa, STUDY: XR CHEST 1 VIEW;  3/6/2024 3:40 am   INDICATION: Signs/Symptoms:daily.   COMPARISON: Chest radiograph dated 3/5/2024   ACCESSION NUMBER(S): [id number]   ORDERING CLINICIAN: BEULAH MCGOVERN   FINDINGS: AP radiograph of the chest   Limitations: Decreased lung volumes. Patient rotation to the left.   Right internal jugular central venous catheter with distal tip overlying the expected location of the mid superior vena cava. Impella device overlying the left ventricle. Postsurgical changes from median sternotomy. Surgical clips project over the right axilla/upper chest.   Coarse interstitial lung markings are noted bilaterally. Ground-glass opacity within the retrocardiac left lower lobe is noted. The heart is mildly enlarged.       1. Coarse interstitial lung markings bilaterally 2. Small left pleural effusion and subsegmental atelectasis       Signed by: Raman Roa 3/6/2024 8:55 AM Dictation workstation:   [id number]    XR chest 1 view    Result Date: 3/5/2024  Interpreted By:  Raman Roa and Ogievich Taessa STUDY: XR CHEST 1 VIEW;  3/5/2024 11:28 am   INDICATION: Signs/Symptoms:Dialysis line.   COMPARISON: Chest x-ray 03/05/2021-03/01/2024 CT 02/02/2022   ACCESSION NUMBER(S): [id number]   ORDERING CLINICIAN: BEULAH MCGOVERN   FINDINGS: AP semi-erect radiograph of the chest   LINES/TUBES/DEVICES: Interval placement of right internal jugular central venous catheter with distal tip overlying the expected location of the mid superior vena cava. Impella device overlying the left ventricle. Postsurgical changes from median sternotomy. Surgical clips project over the right axilla/upper chest.   CARDIOMEDIASTINAL SILHOUETTE: The cardiomediastinal silhouette is stable in size and  configuration.   LUNGS: No acute pulmonary infiltrates or consolidations are noted. The left ventricle obscures the retrocardiac left lower lobe. Vascular clips overlie the right upper thorax and axilla.   ABDOMEN: No remarkable upper abdominal findings.   BONES: No acute osseous abnormality.       1. No acute cardiopulmonary pathology similar to previous study 2. Medical devices as detailed above   I personally reviewed the images/study and I agree with the findings as stated by Heather Lopez DO, PGY-2. This study was interpreted at [place], [place].   Signed by: Raman Roa 3/5/2024 1:46 PM Dictation workstation:   [id number]    XR chest 1 view    Result Date: 3/5/2024  Interpreted By:  Raman Roa and Ogievich Taessa STUDY: XR CHEST 1 VIEW;  3/5/2024 8:30 am   INDICATION: Signs/Symptoms:morning cxr. Per EMR: Day 19 of admission presenting with cardiogenic shock.   COMPARISON: Chest x-ray 03/04/2024-02/29/2024 CT chest 02/02/2022   ACCESSION NUMBER(S): [id number]   ORDERING CLINICIAN: BEULAH MCGOVERN   FINDINGS: AP semi-erect radiograph of the chest   LINES/TUBES/DEVICES: Postsurgical changes from median sternotomy. Surgical clips project over the right axilla. Impella device overlying the left ventricle. Right IJ introducer is noted with distal tip in the superior vena cava.   CARDIOMEDIASTINAL SILHOUETTE: The cardiomediastinal silhouette is stable in size and configuration.   LUNGS: Improvement in lung volume and aeration bilaterally. Persistent but improved interstitial opacities. Bibasilar subsegmental atelectasis. Blunting of the left costophrenic angle may be due to enlarged cardiac silhouette and/or pleural effusion. No evidence of pneumothorax.   ABDOMEN: No remarkable upper abdominal findings.   BONES: No acute osseous abnormality.       1. Improved lung volumes, aeration, and interstitial opacities bilaterally. 2. Persistent bibasilar  atelectasis. 3. Possible small left pleural effusion.   I personally reviewed the images/study and I agree with the findings as stated by Heather Lopez DO, PGY-2. This study was interpreted at [place], [place].   Signed by: Raman Roa 3/5/2024 10:20 AM Dictation workstation:   [id number]    Transthoracic Echo (TTE) Limited    Result Date: 3/4/2024   [facility], [address] [place] [address]                Tel [phone] and Fax [phone] TRANSTHORACIC ECHOCARDIOGRAM REPORT  Patient Name:      MIGUEL DIMAS      Reading Physician:    [id number] Param BASS MD Study Date:        3/4/2024             Ordering Provider:    [id number] ELENA QUINTERO MRN/PID:           [id number]             Fellow: Accession#:        [id number]         Nurse: Date of Birth/Age: 1990 / 33 years  Sonographer:          Adryan Meredith RDCS Gender:            F                    Additional Staff: Height:            154.94 cm            Admit Date:           2/15/2024 Weight:            90.27 kg             Admission Status:     Inpatient -                                                               Routine BSA / BMI:         1.89 m2 / 37.60      Encounter#:           [id number]                    kg/m2                                         Department Location:  [facility] Blood Pressure: 127 /84 mmHg Study Type:    TRANSTHORACIC ECHO (TTE) LIMITED Diagnosis/ICD: Cardiogenic shock-R57.0 Indication:    shock CPT Code:      Echo Limited-77974; Doppler Limited-21093; Color Doppler-84777 Patient History: Pertinent History: 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 Study Detail: The following Echo studies were performed:  2D, M-Mode, Doppler and               color flow. Technically challenging study due to body habitus,               patient lying in supine position, poor acoustic windows, prominent               lung artifact and postoperative dressings. Definity used as a               contrast agent for endocardial border definition. Total contrast               used for this procedure was 2.0 mL via IV push.  PHYSICIAN INTERPRETATION: Left Ventricle: The left ventricular systolic function is moderately to severely decreased, with an estimated ejection fraction of 30-35%. There is global hypokinesis of the left ventricle with minor regional variations. The left ventricular cavity size is normal. Left ventricular diastolic filling was not assessed. Left Atrium: The left atrium is consistent with transplant. Right Ventricle: The right ventricle is mildly enlarged. There is reduced right ventricular systolic function. A device is visualized in the right ventricle. Right Atrium: The right atrium is consistent with a transplant. Aortic Valve: The aortic valve was not well visualized. There is no evidence of aortic valve regurgitation. The peak instantaneous gradient of the aortic valve is 12.2 mmHg. Mitral Valve: The mitral valve is normal in structure. There is mild mitral valve regurgitation. Tricuspid Valve: The tricuspid valve is structurally normal. There is mild tricuspid regurgitation. The Doppler estimated RVSP is mildly elevated at 35.9 mmHg. Pulmonic Valve: The pulmonic valve was not assessed. Pulmonic valve regurgitation was not assessed. Pericardium: There is a trivial to small pericardial effusion. Aorta: The aortic root is normal.  CONCLUSIONS:  1. Left ventricular systolic function is moderately to severely decreased with a 30-35% estimated ejection fraction.  2. There is reduced right ventricular systolic function.  3. Mildly elevated RVSP.  4. There is global hypokinesis of the left ventricle with minor regional  variations. QUANTITATIVE DATA SUMMARY: 2D MEASUREMENTS:                           Normal Ranges: IVSd:          1.10 cm    (0.6-1.1cm) LVPWd:         1.20 cm    (0.6-1.1cm) LVIDd:         4.60 cm    (3.9-5.9cm) LVIDs:         3.90 cm LV Mass Index: 102.3 g/m2 LV % FS        15.2 % LV SYSTOLIC FUNCTION BY 2D PLANIMETRY (MOD):                     Normal Ranges: EF-A4C View: 25.7 % (>=55%) EF-A2C View: 38.1 % EF-Biplane:  33.6 % AORTIC VALVE:                        Normal Ranges: AoV Vmax:    1.75 m/s  (<=1.7m/s) AoV Peak P.2 mmHg (<20mmHg)  RIGHT VENTRICLE: RV s' 0.08 m/s TRICUSPID VALVE/RVSP:                             Normal Ranges: Peak TR Velocity: 2.87 m/s RV Syst Pressure: 35.9 mmHg (< 30mmHg) IVC Diam:         2.00 cm  [id number] Param Doan MD Electronically signed on 3/4/2024 at 1:38:45 PM  ** Final **     XR chest 1 view    Result Date: 3/4/2024  Interpreted By:  Raman Roa, STUDY: XR CHEST 1 VIEW;  3/4/2024 3:32 am   INDICATION: Signs/Symptoms:CTICU morning CXR.   COMPARISON: Chest radiograph dated 3/3/2024   ACCESSION NUMBER(S): [id number]   ORDERING CLINICIAN: ENRRIQUE CRISTINA   FINDINGS: AP radiograph of the chest   Decreased lung volumes are noted. The patient is rotated to the left. Sternal sutures are noted. An Impella device is in adequate position. The heart is mildly enlarged. Interstitial opacities are demonstrated bilaterally subsegmental atelectasis is suggested within the retrocardiac left lower lobe.       1. Mild interstitial opacities bilaterally accentuated by decreased lung volumes. The pulmonary vascular distribution is similar previous study. 2. Possible subsegmental atelectasis and left pleural effusion obscured by the left ventricle.       Signed by: aRman Roa 3/4/2024 8:49 AM Dictation workstation:   [id number]    XR chest 1 view    Result Date: 3/3/2024  Interpreted By:  Shayne Waller, STUDY: XR CHEST 1 VIEW;  3/3/2024 3:44 am   INDICATION: Signs/Symptoms:cardiogenic shock on  MCS.   COMPARISON: 03/02/2024.   ACCESSION NUMBER(S): [id number]   ORDERING CLINICIAN: KIM MEHTA       1.  Removal of the ETT, left internal jugular catheter, right internal jugular  Sedgwick-Estevan catheter. Stable position of the Impella device. 2. Cardiac silhouette is mildly enlarged. 3. Pulmonary vessels are congested with mild pulmonary edema/fluid overload., slightly improved aeration since last exam. 4. Small left-sided pleural effusion. 5. No pneumothorax seen.       MACRO: None   Signed by: Shayne Waller 3/3/2024 12:54 PM Dictation workstation:   [id number]    XR abdomen 1 view    Result Date: 3/2/2024  Interpreted By:  Shayne Waller and Liller Gregory STUDY: XR ABDOMEN 1 VIEW;  3/2/2024 7:42 am   INDICATION: Signs/Symptoms:OG.   COMPARISON: 02/24/2024   ACCESSION NUMBER(S): [id number]   ORDERING CLINICIAN: KIM MEHTA   FINDINGS: Enteric tube projects expected location of the gastric body.   Nonobstructive bowel gas pattern. No evidence to suggest pneumoperitoneum.   Visualized lungs are clear.   No acute osseous injury.       Nonobstructive bowel gas pattern. Enteric tube projects over the expected location of the gastric body.   I personally reviewed the images/study and I agree with the findings as stated above by resident physician, Dr. Sloan Ybarra. The study was interpreted at [facility].   MACRO: none   Signed by: Shayne Waller 3/2/2024 11:48 AM Dictation workstation:   [id number]    XR chest 1 view    Result Date: 3/2/2024  Interpreted By:  Shayne Waller, STUDY: XR CHEST 1 VIEW;  3/2/2024 3:18 am   INDICATION: Signs/Symptoms:cardiogenic shock on MCS.   COMPARISON: 03/01/2024.   ACCESSION NUMBER(S): [id number]   ORDERING CLINICIAN: LYN SELBY       1.  ETT 1.9 cm above remedios. Stable position of the Impella device. Sedgwick-Estevan catheter with the tip overlies the pulmonic trunk. 2. Stable postoperative changes. 3. Low lung  volumes with bronchovascular crowding. 4. Cardiac silhouette is mildly enlarged. 5. Pulmonary vessels are congested with mild pulmonary edema. 6. Small left-sided pleural effusion. 7. No pneumothorax seen.       MACRO: None   Signed by: Shayne Waller 3/2/2024 11:48 AM Dictation workstation:   [id number]    XR chest 1 view    Result Date: 3/2/2024  Interpreted By:  Shayne Waller and Liller Gregory STUDY: XR CHEST 1 VIEW;  3/1/2024 11:14 pm   INDICATION: Signs/Symptoms:s/p impella.   COMPARISON: Same day radiograph of the chest 3:25 a.m.   ACCESSION NUMBER(S): [id number]   ORDERING CLINICIAN: ENRRIQUE CRISTINA   FINDINGS: AP radiograph of the chest was provided.   Left internal jugular central venous catheter tip projects over the expected location of the left brachiocephalic vein. Postsurgical changes consistent with median sternotomy are seen. Endotracheal tube terminates 3.2 cm from the remedios. Impella device overlies the expected location of the left ventricle. Pulmonary artery pressure monitoring device is in place. Surgical clips overlie the right axillary region.   CARDIOMEDIASTINAL SILHOUETTE: Cardiomediastinal silhouette is stable in size and configuration.   LUNGS: Low lung volumes contributing to bronchovascular crowding. May be trace left effusion. No pneumothorax.   ABDOMEN: No remarkable upper abdominal findings.   BONES: No osseous injury.       1. Trace left effusion with associated adjacent atelectasis/consolidation superimposed on bronchovascular crowding. 2. Medical devices described above.   I personally reviewed the images/study and I agree with the findings as stated above by resident physician, Dr. Sloan Ybarra. The study was interpreted at [facility] in [facility].   MACRO: none.   Signed by: Shayne Waller 3/2/2024 8:52 AM Dictation workstation:   [id number]    FL fluoro images no charge    Result Date: 3/1/2024  These images are not reportable by  radiology and will not be interpreted by  Radiologists.    Anesthesia Intraoperative Transesophageal Echocardiogram    Result Date: 3/1/2024  [facility] Dept of Anesthesiology, [address] [place] [address]                     Tel [phone] and Fax [phone] TRANSESOPHAGEAL ECHOCARDIOGRAM REPORT  Patient Name:     MIGUEL DIMAS      Reading          [id number] Harpreet BASS                Physician:       MD Study Date:       3/1/2024             Ordering         [id number] HARPREET ZUNIGA                                        Provider:        ARTHUR MRN/PID:          [id number]             Fellow: Accession#:       [id number]         Nurse: Date of           1990 / 33 years  Sonographer: Birth/Age: Gender:           F                    Additional                                        Staff: BSA / BMI:        m2 / kg/m2           Encounter#:      [id number] Study Type:    ANESTHESIA INTRAOPERATIVE BOB Diagnosis/ICD: Left ventricular failure-I50.1 PHYSICIAN INTERPRETATION: Left Ventricle: The left ventricular systolic function is moderately to severely decreased, with an estimated ejection fraction of 30%. The left ventricular cavity size is normal. Left ventricular diastolic filling was not assessed. Left Atrium: The left atrium is enlarged. There is no evidence of a patent foramen ovale. The left atrial appendage is enlarged and there is no thrombus visualized in the left atrial appendage. Right Ventricle: The right ventricle is mildly enlarged. There is mildly reduced right ventricular systolic function. Right Atrium: The right atrium enlarged. Aortic Valve: The aortic valve is trileaflet. There is no evidence of aortic valve stenosis. There is no evidence of aortic valve regurgitation. Mitral Valve: The mitral valve is normal in structure. There is no evidence of mitral valve stenosis. There is moderate mitral valve regurgitation which is centrally directed. Tricuspid  Valve: The tricuspid valve is structurally normal. There is mild tricuspid regurgitation. Pulmonic Valve: The pulmonic valve is structurally normal. There is mild pulmonic valve regurgitation. Pericardium: There is a small pericardial effusion. Aorta: The aortic root is normal. There is no evidence of aortic dissection. The ascending Ao diameter is 3.0 cm. Additional Comments: Study performed on inotropic support. Surgeon aware of all findings. In comparison to the previous echocardiogram(s): Not performed on intraoperative study.  CONCLUSIONS:  1. Left ventricular systolic function is moderately to severely decreased with a 30% estimated ejection fraction.  2. There is mildly reduced right ventricular systolic function.  3. The left atrium is enlarged.  4. Moderate mitral valve regurgitation.  5. Aortic valve stenosis is not present. POST CARDIOPULMONARY BYPASS REPORT: Successful placement of Impella 5.5 device into LV. Surgeon aware of all findings.  QUANTITATIVE DATA SUMMARY: MITRAL INSUFFICIENCY:                           Normal Ranges: PISA Radius:  0.7 cm MR Alias Claude: 40.4 cm/s MR Flow Rt:   127.96 ml/s  [id number] Arian Ha MD Electronically signed on 3/1/2024 at 8:38:48 PM  ** Final **     Point of Care Ultrasound    Result Date: 3/1/2024  Demetrio Murrell MD     3/1/2024  6:34 PM Point-of-care ultrasound ordered for patient's history of heart failure with rising tachycardia rising creatinine.  Apical four-chamber parasternal long axis and parasternal short axis transthoracic views were obtained.  LV function moderately reduced with EF 30 to 40%.  Right ventricle mildly enlarged but shows okay systolic function.  Trivial pericardial effusion.  IVC with minimal respiratory variation and 2 cm.  Hepatic vein Doppler ultrasound with SD reversal, portal waveform with undulation both consistent with venous congestion. Point-of-care cardiac echo shows cardiac activity is present, trivial pericardial effusion  and reduced ejection fraction. Venous access ultrasound shows evidence of moderate to severe venous congestion    Transthoracic Echo (TTE) Limited    Result Date: 3/1/2024   [facility] [address] [place] [address]                Tel [phone] and Fax [phone] TRANSTHORACIC ECHOCARDIOGRAM REPORT  Patient Name:      MIGUEL DIMAS      Reading Physician:    [id number] Arian BASS MD Study Date:        3/1/2024             Ordering Provider:    [id number] ELENA QUINTERO MRN/PID:           [id number]             Fellow: Accession#:        [id number]         Nurse: Date of Birth/Age: 1990 / 33 years  Sonographer:          Adryan Meredith RDCS Gender:            F                    Additional Staff: Height:            154.94 cm            Admit Date:           2/15/2024 Weight:            91.17 kg             Admission Status:     Inpatient - STAT BSA / BMI:         1.89 m2 / 37.98      Encounter#:           [id number]                    kg/m2                                         Department Location:  [facility] Blood Pressure: 111 /62 mmHg Study Type:    TRANSTHORACIC ECHO (TTE) LIMITED Diagnosis/ICD: Cardiogenic shock-R57.0; Heart transplant status-Z94.1 Indication:    shock, s/p heart tx CPT Code:      Echo Limited-11468 Patient History: Pertinent History: ECMO decannulation 2.29.24, 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 Study Detail: The following Echo studies were performed: 2D. Technically               challenging study due to patient lying in supine position. The               patient is on a balloon pump.  PHYSICIAN INTERPRETATION: Left Ventricle: The left ventricular systolic function is moderately decreased, with an estimated ejection fraction of  30-35%. There is global hypokinesis of the left ventricle with minor regional variations. The left ventricular cavity size is normal. The left ventricular septal wall thickness is mildly increased. There is mild concentric left ventricular hypertrophy. Left ventricular diastolic filling was not assessed. Left Atrium: The left atrium is suggestive of transplant. Right Ventricle: The right ventricle is mildly enlarged. There is reduced right ventricular systolic function. A device is visualized in the right ventricle. Right Atrium: The right atrium is suggestive of a transplant. Aortic Valve: The aortic valve is trileaflet. Aortic valve regurgitation was not assessed. Mitral Valve: The mitral valve is normal in structure. Mitral valve regurgitation was not assessed. Tricuspid Valve: The tricuspid valve is structurally normal. Tricuspid regurgitation was not assessed. Pulmonic Valve: The pulmonic valve is not well visualized. Pulmonic valve regurgitation was not assessed. Pericardium: There is a trivial pericardial effusion. Aorta: The aortic root is normal. There is an IABP noted in the descending aorta. Systemic Veins: The inferior vena cava appears mildly dilated. There is less than 50% IVC collapse with inspiration. In comparison to the previous echocardiogram(s): Compared with study from 2/29/2024, LVEF decreased from 44% to 30-35% on current study.  CONCLUSIONS:  1. Left ventricular systolic function is moderately decreased with a 30-35% estimated ejection fraction.  2. There is reduced right ventricular systolic function.  3. There is an IABP noted in the descending aorta.  4. There is global hypokinesis of the left ventricle with minor regional variations. QUANTITATIVE DATA SUMMARY: 2D MEASUREMENTS:                           Normal Ranges: IVSd:          1.20 cm    (0.6-1.1cm) LVPWd:         1.10 cm    (0.6-1.1cm) LVIDd:         4.60 cm    (3.9-5.9cm) LVIDs:         3.80 cm LV Mass Index: 101.9 g/m2 LV % FS         17.4 % TRICUSPID VALVE/RVSP:                   Normal Ranges: IVC Diam: 2.30 cm  [id number] Arian Abbasi MD Electronically signed on 3/1/2024 at 5:26:05 PM  ** Final **     XR chest 1 view    Result Date: 3/1/2024  Interpreted By:  Raman Roa, STUDY: XR CHEST 1 VIEW;  3/1/2024 4:11 am   INDICATION: Signs/Symptoms:cardiogenic shock on MCS.   COMPARISON: Chest radiograph dated 2/29/2024   ACCESSION NUMBER(S): [id number]   ORDERING CLINICIAN: LYN SELBY   FINDINGS: AP radiograph of the chest   The swans Estevan catheter is positioned within the region of the left main pulmonary artery. The left internal jugular catheter is positioned within the superior vena cava near the origin of the brachiocephalic vein. The balloon pump radiopaque marker is positioned at the level of the aortic arch.. The ECMO device is been removed.   The heart is maximum normal in size. The retrocardiac left lower lobe is obscured by the left ventricle. Otherwise the lungs are well aerated.       1. No acute cardiopulmonary pathology 2. Likely subsegmental atelectasis within the retrocardiac left lower lobe       Signed by: Raman Roa 3/1/2024 9:49 AM Dictation workstation:   [id number]    Transthoracic Echo (TTE) Limited    Result Date: 2/29/2024   [facility], [address] [place] [address]                Tel [phone] and Fax [phone] TRANSTHORACIC ECHOCARDIOGRAM REPORT  Patient Name:      MIGUEL DIMAS      Reading Physician:    [id number] Francis Rodríguez MD Study Date:        2/29/2024            Ordering Provider:    [id number] LYN SELBY MRN/PID:           [id number]             Fellow: Accession#:        [id number]         Nurse: Date of Birth/Age: 1990 / 33 years  Sonographer:          Adryan Meredith RDCS Gender:             F                    Additional Staff: Height:            152.40 cm            Admit Date:           2/15/2024 Weight:            91.17 kg             Admission Status:     Inpatient -                                                               [facility]                                                               threatening BSA / BMI:         1.87 m2 / 39.26      Encounter#:           [id number]                    kg/m2                                         Department Location:  [facility] Blood Pressure: 103 /48 mmHg Study Type:    TRANSTHORACIC ECHO (TTE) LIMITED Diagnosis/ICD: Cardiogenic shock-R57.0 Indication:    s/p ECMo decunnulation to ensure pt able to be maintained off                ECMO CPT Code:      Echo Limited-95476; Doppler Limited-43926; Color Doppler-22727 Patient History: Pertinent History: ECMO decannulation , 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 Study Detail: The following Echo studies were performed: 2D, M-Mode, Doppler and               color flow. Technically challenging study due to patient lying in               supine position. Definity used as a contrast agent for endocardial               border definition. Total contrast used for this procedure was 2.0               mL via IV push.  PHYSICIAN INTERPRETATION: Left Ventricle: The left ventricular systolic function is mildly decreased, with an estimated ejection fraction of 44%. There is global hypokinesis of the left ventricle with minor regional variations. The left ventricular cavity size is normal. The left ventricular septal wall thickness is mildly increased. There is mildly increased left ventricular posterior wall thickness. Left ventricular diastolic filling was not assessed. Patient persisted with tachycardia at 136 bpm during the study. Left Atrium: The left atrium is consistent with transplant. Right Ventricle: The right ventricle is normal in size. There is reduced right ventricular systolic  function. A device is visualized in the right ventricle. Right Atrium: The right atrium is normal in size. There is a device visualized in the right atrium. Aortic Valve: The aortic valve is trileaflet. There is trace to mild aortic valve regurgitation. Mitral Valve: The mitral valve is normal in structure. There is mild to moderate mitral valve regurgitation. Tricuspid Valve: The tricuspid valve is abnormal. There is mild to moderate tricuspid regurgitation. The Doppler estimated RVSP is mildly elevated at 35.3 mmHg. Pulmonic Valve: The pulmonic valve was not assessed. Pulmonic valve regurgitation was not assessed. Pericardium: There is a trivial to small pericardial effusion. Aorta: The aortic root was not assessed. In comparison to the previous echocardiogram(s): Compared with study from 2/28/2024, there is a mild increase in LVEF from 30% to 44%. There is still RV systolic dysfunction.  CONCLUSIONS:  1. Left ventricular systolic function is mildly decreased with a 44% estimated ejection fraction.  2. There is global hypokinesis of the left ventricle with minor regional variations.  3. There is reduced right ventricular systolic function.  4. Mild to moderate mitral valve regurgitation.  5. Mild to moderate tricuspid regurgitation visualized.  6. Mildly elevated RVSP.  7. Patient persisted with tachycardia at 136 bpm during the study.  8. Compared with study from 2/28/2024, there is a mild increase in LVEF from 30% to 44%. There is still RV systolic dysfunction. QUANTITATIVE DATA SUMMARY: 2D MEASUREMENTS:                          Normal Ranges: IVSd:          1.10 cm   (0.6-1.1cm) LVPWd:         1.10 cm   (0.6-1.1cm) LVIDd:         4.40 cm   (3.9-5.9cm) LVIDs:         3.60 cm LV Mass Index: 90.3 g/m2 LV % FS        18.2 % LV SYSTOLIC FUNCTION BY 2D PLANIMETRY (MOD):                     Normal Ranges: EF-A4C View: 43.8 % (>=55%) EF-A2C View: 43.6 % EF-Biplane:  52.1 % MITRAL INSUFFICIENCY:                            Normal Ranges: PISA Radius:  0.3 cm MR VTI:       84.90 cm MR Vmax:      424.00 cm/s MR Alias Claude: 38.5 cm/s MR Volume:    4.36 ml MR Flow Rt:   21.77 ml/s MR EROA:      0.05 cm2  RIGHT VENTRICLE: RV s' 0.09 m/s TRICUSPID VALVE/RVSP:                             Normal Ranges: Peak TR Velocity: 2.84 m/s Est. RA Pressure: 3 mmHg RV Syst Pressure: 35.3 mmHg (< 30mmHg)  [id number] Francis Rodríguez MD Electronically signed on 2/29/2024 at 4:29:31 PM  ** Final **     XR chest 1 view    Result Date: 2/29/2024  Interpreted By:  Raman Roa, STUDY: XR CHEST 1 VIEW;  2/29/2024 3:48 am   INDICATION: Signs/Symptoms:icu.   COMPARISON: Chest radiograph dated 2/28/2024   ACCESSION NUMBER(S): [id number]   ORDERING CLINICIAN: SHELLI ESPITIA   FINDINGS: AP radiograph of the chest     The right internal jugular catheter and left internal jugular catheter appear in satisfactory position. Sternal sutures present. The balloon pump radiopaque marker is less than 2 cm below the level of the aortic arch. The ECMO device is in satisfactory position.   The heart is normal in size. No acute pulmonary infiltrates are seen. The left ventricle obscures the retrocardiac left lower lobe.       1. No acute cardiopulmonary pathology       Signed by: Raman Roa 2/29/2024 12:46 PM Dictation workstation:   [id number]    XR chest 1 view    Result Date: 2/29/2024  Interpreted By:  Raman Roa, STUDY: XR CHEST 1 VIEW;  2/28/2024 4:17 am   INDICATION: Signs/Symptoms:icu.   COMPARISON: Chest radiograph dated 02/27/2024   ACCESSION NUMBER(S): [id number]   ORDERING CLINICIAN: SHELLI ESPITIA   FINDINGS: AP radiograph of the chest     The right internal jugular swans Estevan catheter is positioned within the right pulmonary artery. The left internal jugular catheter is positioned within the superior vena cava near the origin of the brachiocephalic vein. Sternal sutures are intact. The balloon pump radiopaque marker is in a similar position to previous study. The  ECMO device is noted.   The heart is normal in size pulmonary aeration is similar previous study.       1. Status quo       Signed by: Raman Roa 2/29/2024 8:40 AM Dictation workstation:   [id number]    Transthoracic Echo (TTE) Limited    Result Date: 2/28/2024   [facility], [address] [place] [address]                Tel [phone] and Fax [phone] TRANSTHORACIC ECHOCARDIOGRAM REPORT  Patient Name:      YAIRRADHAKHURRAM DIMAS      Reading Physician:    [id number] Kei BASS MD Study Date:        2/28/2024            Ordering Provider:    [id number] LYN SELBY MRN/PID:           [id number]             Fellow:               [id number] Mg Malik MD Accession#:        [id number]         Nurse: Date of Birth/Age: 1990 / 33 years  Sonographer:          Bernadette Madsen RDCS Gender:            F                    Additional Staff: Height:            152.40 cm            Admit Date:           2/15/2024 Weight:            91.17 kg             Admission Status:     Inpatient - STAT BSA / BMI:         1.87 m2 / 39.26      Encounter#:           [id number]                    kg/m2                                         Department Location:  [facility] Blood Pressure: 103 /61 mmHg Study Type:    TRANSTHORACIC ECHO (TTE) LIMITED Diagnosis/ICD: Cardiogenic shock-R57.0 Indication:    ECMO turn down CPT Code:      Echo Limited-37397 Patient History: Pertinent History: OHT (3/2022), [place] ECMO. Study Detail: The following Echo studies were performed: 2D. Definity used as a               contrast agent for endocardial border definition. Total contrast               used for this procedure was 2.0 mL via IV push.  PHYSICIAN INTERPRETATION: Left Ventricle: The  left ventricular systolic function is moderately to severely decreased, with an estimated ejection fraction of 30%. There is global hypokinesis of the left ventricle with minor regional variations. The left ventricular cavity size is normal. There is concentric left ventricular hypertrophy. Left ventricular diastolic filling was not assessed. Left Atrium: The left atrium is consistent with transplant. Right Ventricle: The right ventricle is normal in size. There is reduced right ventricular systolic function. Right Atrium: The right atrium is normal in size. Aortic Valve: The aortic valve is probably trileaflet. Aortic valve regurgitation was not assessed. Mitral Valve: The mitral valve is normal in structure. Mitral valve regurgitation was not assessed. Tricuspid Valve: The tricuspid valve is structurally normal. Tricuspid regurgitation was not assessed. Pulmonic Valve: The pulmonic valve is not well visualized. Pulmonic valve regurgitation was not assessed. Pericardium: There is a trivial to small pericardial effusion. Aorta: The aortic root is normal. In comparison to the previous echocardiogram(s): Compared with study from 2/27/2024, no significant change.  CONCLUSIONS:  1. Left ventricular systolic function is moderately to severely decreased with a 30% estimated ejection fraction.  2. There is global hypokinesis of the left ventricle with minor regional variations.  3. There is reduced right ventricular systolic function.  4. EF appears to have improved on turn down. QUANTITATIVE DATA SUMMARY: LA VOLUME:                              Normal Ranges: LA Vol A4C:        65.3 ml   (22+/-6mL/m2) LA Vol Index A4C:  34.9ml/m2 LA Area A4C:       22.0 cm2 LA Major Axis A4C: 6.3 cm  [id number] Kei Lozano MD Electronically signed on 2/28/2024 at 9:26:56 AM  ** Final **     Vascular US lower extremity arterial duplex left    Result Date: 2/27/2024            [place] [id number]    Tel [phone] and Fax [phone]  Vascular Lab Report [facility] US LOWER EXTREMITY ARTERIAL DUPLEX LEFT  Patient Name:     MIGUEL Camara           [id number] Rhianna BASS                Physician: Study Date:       2/23/2024            Ordering          [id number] ELENA QUINTERO                                        Physician: MRN/PID:          [id number]             Technologist:     Diandra Carcamo RVT,                                                          [facility] Accession#:       [id number]         Technologist 2: Date of           1990 / 33 years  Encounter#:       [id number] Birth/Age: Gender:           F Admission Status: Inpatient            Location          [facility]                                        Performed:  Diagnosis/ICD: Peripheral vascular disease, unspecified-I73.9 CPT Codes:     08896 Peripheral artery Lower arterial Duplex limited  CONCLUSIONS: Left Lower Arterial: Limited exam due to ECMO line. The distal superficial femoral, popliteal, peroneal, posterior tibial, anterior tibial, and dorsalis pedis arteries are patent with low monophasic flow.  Imaging & Doppler Findings:  Right                    Left  PSV                      PSV          SFA Distal     20 cm/s           Popliteal     13 cm/s          ANGEL Distal     9 cm/s       Peroneal Proximal 16 cm/s          PTA Distal     8 cm/s  [id number] Rhianna Dumont MD Electronically signed by [id number] Rhianna Dumont MD on 2/27/2024 at 2:43:35 PM  ** Final **     Transthoracic Echo (TTE) Limited    Result Date: 2/27/2024   [facility], [address] [place] [address]                Tel [phone] and Fax [phone] TRANSTHORACIC ECHOCARDIOGRAM REPORT  Patient Name:      MIGUEL Camara Physician:    [id number] Kei BASS MD Study Date:        2/27/2024            Ordering Provider:    [id number] JILL ALCANTARA                                                                SELENA MRN/PID:           [id number]             Fellow: Accession#:        [id number]         Nurse: Date of Birth/Age: 1990 / 33 years  Sonographer:          Bernadette Madsen RDCS Gender:            F                    Additional Staff: Height:            152.40 cm            Admit Date:           2/15/2024 Weight:            92.53 kg             Admission Status:     Inpatient - STAT BSA / BMI:         1.88 m2 / 39.84      Encounter#:           [id number]                    kg/m2                                         Department Location:  [facility] Blood Pressure: 124 /64 mmHg Study Type:    TRANSTHORACIC ECHO (TTE) LIMITED Diagnosis/ICD: Cardiogenic shock-R57.0 Indication:    ECMO turn down trial CPT Code:      Echo Limited-99057 Patient History: Pertinent History: IABP, VA-ECMO, 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 Study Detail: The following Echo studies were performed: 2D.  PHYSICIAN INTERPRETATION: Left Ventricle: The left ventricular systolic function is moderately to severely decreased, with an estimated ejection fraction of 30%. There is global hypokinesis of the left ventricle with minor regional variations. The left ventricular cavity size is normal. There is moderate concentric left ventricular hypertrophy. Abnormal (paradoxical) septal motion consistent with post-operative status. Left ventricular diastolic filling was not assessed. Left Atrium: The left atrium is enlarged. Right Ventricle: The right ventricle is normal in size. There is moderately reduced right ventricular systolic function. Right Atrium: The right atrium is normal in size. Aortic Valve: The aortic valve was not well visualized. Aortic valve regurgitation was not assessed. Mitral Valve: The mitral valve is normal in structure. Mitral valve regurgitation was not assessed. Tricuspid Valve: The tricuspid valve is structurally  normal. Tricuspid regurgitation was not assessed. Pulmonic Valve: The pulmonic valve is structurally normal. Pulmonic valve regurgitation was not assessed. Pericardium: There is a trivial to small pericardial effusion. Aorta: The aortic root was not assessed.  CONCLUSIONS:  1. Left ventricular systolic function is moderately to severely decreased with a 30% estimated ejection fraction.  2. There is global hypokinesis of the left ventricle with minor regional variations.  3. Abnormal septal motion consistent with post-operative status.  4. There is moderate concentric left ventricular hypertrophy.  5. There is moderately reduced right ventricular systolic function. QUANTITATIVE DATA SUMMARY:  [id number] Kei Lozano MD Electronically signed on 2/27/2024 at 11:08:39 AM  ** Final **     XR chest 1 view    Result Date: 2/27/2024  Interpreted By:  Raman Roa, STUDY: XR CHEST 1 VIEW;  2/27/2024 4:02 am   INDICATION: Signs/Symptoms:icu.   COMPARISON: Chest radiograph dated 2/26/2024   ACCESSION NUMBER(S): [id number]   ORDERING CLINICIAN: SHELLI ESPITIA   FINDINGS: AP radiograph of the chest   The right internal jugular swans Estevan catheter is positioned within the right pulmonary artery. The left internal jugular catheter is positioned within the superior vena cava near the origin of the brachiocephalic vein. Sternal sutures are intact. Balloon pump radiopaque marker appears to have been advanced since the prior study. The tip of the fiduciary marker is closer to the aortic arch. ECMO device is noted.   The heart is normal in size. The retrocardiac left lower lobe left diaphragm and costophrenic sulcus are obscured by the left ventricle. No acute pulmonary infiltrates are seen. There is improved pulmonary aeration within the right lower lobe and decreased discoid atelectasis.       1. Improved pulmonary aeration and improved pulmonary edema in comparison to the prior study       Signed by: Raman Roa 2/27/2024 10:39 AM  Dictation workstation:   [id number]    XR chest 1 view    Result Date: 2/27/2024  Interpreted By:  Raman Roa, STUDY: XR CHEST 1 VIEW;  2/26/2024 3:45 am   INDICATION: Signs/Symptoms:Post op cardiac surgery.   COMPARISON: Chest radiograph dated 02/24/2024   ACCESSION NUMBER(S): [id number]   ORDERING CLINICIAN: LUBNA RIBERA   FINDINGS: AP radiograph of the chest Limitations: Decreased lung volumes.   The swans Estevan catheter is positioned within the right pulmonary artery. The ECMO cannula is in satisfactory position. Balloon pump radiopaque marker is overlying the mid descending thoracic aorta proximally 5 cm from the aortic arch. The left internal jugular catheter is positioned within superior vena cava near the origin of the brachiocephalic vein.   The heart is mildly enlarged.   Interstitial pulmonary edema is improved compared to previous study. There is mild elevation of the right diaphragm. Subsegmental atelectasis above the right diaphragm is noted. Blunting of the left costophrenic sulcus noted.       1. Improved interstitial pulmonary edema in particular within the right lung in comparison to the previous study. 2. Subsegmental atelectasis above the mildly elevated right diaphragm 3. Small left pleural effusion 4. The heart appears enlarged probably magnified by the projection       Signed by: Raman Roa 2/27/2024 8:09 AM Dictation workstation:   [id number]    Transthoracic Echo (TTE) Limited    Result Date: 2/26/2024   [facility], [address] [place] [address]                Tel [phone] and Fax [phone] TRANSTHORACIC ECHOCARDIOGRAM REPORT  Patient Name:     MIGUEL DIMAS      Reading           [id number] Abel BASS                Physician:        MD Study Date:       2/25/2024            Ordering          [id number] JILL WALTERS                                        Provider: MRN/PID:          [id number]             Fellow: Accession#:        [id number]         Nurse: Date of           1990 / 33 years  Sonographer:      Tomas Moran Birth/Age: Gender:           F                    Additional Staff: Weight:                                Admission Status: Inpatient - Routine BSA / BMI:        m2 / kg/m2           Encounter#:       [id number] Study Type:    TRANSTHORACIC ECHO (TTE) LIMITED Diagnosis/ICD: Cardiogenic shock-R57.0  Study Detail: The following Echo studies were performed: 2D.  PHYSICIAN INTERPRETATION: Left Ventricle: The left ventricular systolic function is severely decreased. There is global hypokinesis of the left ventricle with minor regional variations. The left ventricular cavity size is mild to moderately dilated. Abnormal (paradoxical) septal motion consistent with post-operative status. Left ventricular diastolic filling was not assessed. Left Atrium: The left atrium is consistent with transplant. Right Ventricle: The right ventricle is mildly enlarged. There is reduced right ventricular systolic function. A device is visualized in the right ventricle. Right Atrium: The right atrium is consistent with a transplant. Aortic Valve: The aortic valve appears structurally normal. Aortic valve regurgitation was not assessed. Mitral Valve: The mitral valve is normal in structure. Mitral valve regurgitation was not assessed. Tricuspid Valve: The tricuspid valve is structurally normal. Tricuspid regurgitation was not assessed. Pulmonic Valve: The pulmonic valve was not assessed. Pulmonic valve regurgitation was not assessed. Pericardium: There is a small pericardial effusion. Aorta: The aortic root is normal. In comparison to the previous echocardiogram(s): Compared with study from 2/23/2024, may be a very small imporvement in LV systolic function but overall still severely depressed.  CONCLUSIONS:  1. Left ventricular systolic function is severely decreased.  2. Abnormal septal motion consistent with post-operative status.  3.  There is reduced right ventricular systolic function.  4. Compared with study from 2/23/2024, may be a very small imporvement in LV systolic function but overall still severely depressed.  5. There is global hypokinesis of the left ventricle with minor regional variations. QUANTITATIVE DATA SUMMARY:  [id number] Abel Gurrola MD Electronically signed on 2/26/2024 at 6:00:42 PM ** Final **     XR chest 1 view    Result Date: 2/25/2024  Interpreted By:  Angelina Cagle, STUDY: XR CHEST 1 VIEW;  2/25/2024 3:43 am   INDICATION: Signs/Symptoms:CTICU.   COMPARISON: Radiograph dated 02/24/2024, 5:03 p.m.   ACCESSION NUMBER(S): [id number]   ORDERING CLINICIAN: NICOLE NDIAYE   FINDINGS: Enteric tube is in place with the tip outside the field of view. Right IJ [place]-Estevan catheter is in place with the tip projecting over the right main pulmonary artery. Left IJ central venous catheter is projecting over the left brachiocephalic vein. Intra-aortic balloon pump radiopaque marker is projecting over the proximal descending thoracic aorta. Pulmonary artery pressure monitoring device is projecting over the left lung hilum. Again seen ECMO cannula projecting over lower SVC.   Status post median sternotomy. The cardiac silhouette size is unchanged.   Again seen mild interstitial pulmonary edema. No focal infiltrate or pneumothorax. Question trace left pleural effusion.   No acute osseous change.       1. Unchanged mild interstitial pulmonary edema. Cannot exclude trace left pleural effusion. 2. Medical devices and postsurgical changes as described above       Signed by: Angelina Narayan 2/25/2024 7:46 AM Dictation workstation:   [id number]    XR chest 1 view    Result Date: 2/25/2024  Interpreted By:  Angelina Cagle, STUDY: XR CHEST 1 VIEW;  2/24/2024 5:10 pm   INDICATION: Signs/Symptoms:IABP repositioned.   COMPARISON: Radiograph dated 02/24/2024   ACCESSION NUMBER(S): [id number]   ORDERING CLINICIAN: JILL  GOLD   FINDINGS: Enteric tube is in place with the tip outside the field of view. Right IJ [place]-Estevan catheter is in place with the tip projecting over the right main pulmonary artery. Left IJ central venous catheter is projecting over the left brachiocephalic vein. Intra-aortic balloon pump radiopaque marker is projecting over the aortic knob. Pulmonary artery pressure monitoring device is projecting over the left lung hilum. Again seen ECMO cannula projecting over lower SVC.   Status post median sternotomy. The cardiac silhouette size is unchanged.   Slight interval increase in interstitial edema. No focal infiltrate or pneumothorax. Question trace left pleural effusion.   No acute osseous change.       1. Slight interval worsening of pulmonary edema. 2. Bibasilar atelectasis and suggestion of trace left pleural effusion. 3. Medical devices and postsurgical changes as described above.       Signed by: Angelina Narayan 2/25/2024 7:44 AM Dictation workstation:   [id number]    XR abdomen 1 view    Result Date: 2/24/2024  Interpreted By:  Angelina Cagle, STUDY: XR ABDOMEN 1 VIEW;  2/24/2024 3:23 pm   INDICATION: Signs/Symptoms:dobhoff placement post-extubation.   COMPARISON: Radiograph dated 02/21/2024   ACCESSION NUMBER(S): [id number]   ORDERING CLINICIAN: JILL WALTERS   FINDINGS: Single-view of the abdomen. Lower pelvis is not included. Dobbhoff catheter is projecting over the distal 2nd portion of the duodenum. Nonobstructive bowel gas pattern.  Limited evaluation of pneumoperitoneum on supine imaging, however no gross evidence of free air is noted.   Partially imaged ECMO cannula in sternotomy wires.   Osseous structures demonstrate no acute bony changes.       1.  Nonobstructive bowel gas pattern. 2. Medical devices as described above   Signed by: Angelina Narayan 2/24/2024 4:32 PM Dictation workstation:   [id number]    XR chest 1 view    Result Date: 2/24/2024  Interpreted By:  Celso Narayan  Angelina, STUDY: XR CHEST 1 VIEW;  2/24/2024 4:15 am   INDICATION: Signs/Symptoms:Post op cardiac surgery.   COMPARISON: Radiograph dated 02/23/2024   ACCESSION NUMBER(S): [id number]   ORDERING CLINICIAN: LUBNA RIBERA   FINDINGS: ET tube is terminating 1.6 cm from the remedios. Enteric tube is in place with the tip outside the field of view. Right IJ approach [place]-Estevan catheter is projecting over the distal right main pulmonary artery. Intra-aortic balloon pump radiopaque markers projecting over proximal descending thoracic aorta. ECMO cannulas projecting over the lower SVC. A left IJ central venous catheter is projecting over the expected location of the confluence of brachiocephalic veins. Pulmonary artery pressure monitoring device is projecting over the left lung hilum.   The cardiac silhouette size is within normal limits. Status post median sternotomy.   There is no focal consolidation, edema or pneumothorax. No sizeable pleural effusion. No acute osseous abnormality.       1. Medical devices and postsurgical changes as described above. 2. No focal infiltrate, sizeable pleural effusion, edema or pneumothorax. Mild bibasilar atelectasis.       Signed by: Angelina Narayan 2/24/2024 8:36 AM Dictation workstation:   [id number]    Transthoracic Echo (TTE) Limited    Result Date: 2/23/2024   [facility], [address] [place] [address]                Tel [phone] and Fax [phone] TRANSTHORACIC ECHOCARDIOGRAM REPORT  Patient Name:      MIGUEL DIMAS      Reading Physician:    [id number] Enedelia Kowalski MD Study Date:        2/23/2024            Ordering Provider:    [id number] ODILON FUENTES MRN/PID:           [id number]             Fellow: Accession#:        [id number]         Nurse: Date of Birth/Age: 1990 / 33 years  Sonographer:          Bernadette Madsen                                                                [facility] Gender:            F                    Additional Staff: Height:            152.40 cm            Admit Date:           2/15/2024 Weight:            99.34 kg             Admission Status:     Inpatient -                                                               Routine BSA / BMI:         1.94 m2 / 42.77      Encounter#:           [id number]                    kg/m2                                         Department Location:  [facility] Blood Pressure: 117 /63 mmHg Study Type:    TRANSTHORACIC ECHO (TTE) LIMITED Diagnosis/ICD: Cardiogenic shock-R57.0 Indication:    Turn down trial CPT Code:      Echo Limited-29873 Patient History: Pertinent History: Heart transplant (03/2022), IABP 2/18, ECMP 2/19, intubated                    2/21, cardiogenic shock, allograft rejection. Study Detail: The following Echo studies were performed: 2D. The patient is               intubated and on a balloon pump.  PHYSICIAN INTERPRETATION: Left Ventricle: The left ventricular systolic function is severely decreased, with an estimated ejection fraction of 10%. The left ventricular cavity size is normal. Left ventricular diastolic filling was not assessed. Left Atrium: The left atrium is consistent with transplant. Right Ventricle: The right ventricle is normal in size. There is severely reduced right ventricular systolic function. Right Atrium: The right atrium is consistent with a transplant. Aortic Valve: The aortic valve is trileaflet. Aortic valve regurgitation was not assessed. Mitral Valve: The mitral valve is normal in structure. Mitral valve regurgitation was not assessed. Tricuspid Valve: The tricuspid valve was not well visualized. Tricuspid regurgitation was not assessed. Pulmonic Valve: The pulmonic valve is structurally normal. Pulmonic valve regurgitation was not assessed. Pericardium: There is no pericardial effusion noted. Aorta: The aortic root is  normal.  CONCLUSIONS:  1. Left ventricular systolic function is severely decreased with a 10% estimated ejection fraction.  2. Poorly visualized anatomical structures due to suboptimal image quality.  3. There is severely reduced right ventricular systolic function.  4. ECMO turn down trial . At 3 L the ejection fraction was globaly and severely reduced at 10% and remained stable as the flow was gradually decreased down to 1L. QUANTITATIVE DATA SUMMARY: 2D MEASUREMENTS:                          Normal Ranges: IVSd:          1.00 cm   (0.6-1.1cm) LVPWd:         1.00 cm   (0.6-1.1cm) LVIDd:         4.40 cm   (3.9-5.9cm) LVIDs:         4.00 cm LV Mass Index: 76.2 g/m2 LV % FS        9.1 %  [id number] Enedelia Kowalski MD Electronically signed on 2/23/2024 at 3:28:33 PM  ** Final **     XR chest 1 view    Result Date: 2/23/2024  Interpreted By:  Duarte Guillory, STUDY: XR CHEST 1 VIEW;  2/23/2024 1:13 pm   INDICATION: Signs/Symptoms:PA catheter positioning..   COMPARISON: Radiographs since 02/20/2024, the most recent 02/23/2024 at 3:26 a.m..   ACCESSION NUMBER(S): [id number]   ORDERING CLINICIAN: ODILON FUENTES   FINDINGS: Again identified are midline sternotomy sutures, endotracheal and enteric tubes, pulmonary arterial catheter, and right central venous line. The tip of the pulmonary arterial catheter appears to lie in the distal right pulmonary artery or its proximal interlobar branch. The tip of the endotracheal tube lies 2.6 cm above the remedios.   Heart size is within upper limits of normal and unchanged since previous.   There is no convincing pulmonary parenchymal mass nor infiltrate on the current study. There is also no significant pleural fluid or pneumothorax.   Osseous structures are grossly intact.         1. Tubes and lines in place, as noted. 2. No other significant acute cardiopulmonary process on the current examination       MACRO: None   Signed by: Duarte Guillory 2/23/2024 1:59 PM Dictation workstation:    [id number]    XR chest 1 view    Result Date: 2/23/2024  Interpreted By:  Duarte Guillory, STUDY: XR CHEST 1 VIEW;  2/23/2024 3:32 am   INDICATION: Signs/Symptoms:Post op cardiac surgery.   COMPARISON: 02/22/2024.   ACCESSION NUMBER(S): [id number]   ORDERING CLINICIAN: LUBNA RIBERA   FINDINGS: Midline sternotomy sutures are again noted. Also again identified are endotracheal and enteric tubes, pulmonary arterial catheter, and left internal jugular venous line. Tip of IABP is projected along the upper descending thoracic aorta at the approximate level of the left hilum.   The tip of the endotracheal tube lies 2.7 cm above the remedios.   Lung volumes are slightly diminished. There is questionable mild atelectasis, infiltrate, or pleural fluid at the left lung base, the appearance of which could be at least partially artifactual. Remaining lung fields are otherwise clear. There is no pneumothorax.       1.  Questionable mild left basilar density, which may also been present on the previous day. 2. No other significant acute interval change.       MACRO: None   Signed by: Duarte Guillory 2/23/2024 1:57 PM Dictation workstation:   [id number]    XR chest 1 view    Result Date: 2/23/2024  Interpreted By:  Duarte Guillory, STUDY: XR CHEST 1 VIEW;  2/22/2024 3:46 am   INDICATION: Signs/Symptoms:Post op cardiac surgery.   COMPARISON: Radiographs since 02/20/2024, the most recent 02/21/2024.   ACCESSION NUMBER(S): [id number]   ORDERING CLINICIAN: ULBNA RIBERA   FINDINGS: Again identified are midline sternotomy sutures.   Endotracheal and enteric tubes are again noted. The tip of the endotracheal tube lies 1.6 cm above the remedios.   Tip of the pulmonary arterial catheter is faintly visualized within the main or right pulmonary artery. Left internal jugular venous line is also again noted. Metallic tip of the IABP is again noted along the upper descending thoracic aorta, similar to previous.   Heart size is within upper limits  of normal and unchanged since previous.   There are suspicious developing atelectasis or consolidation and pleural fluid at the left base compared to previous. Remaining lung fields are clear other significant new mass or infiltrate. There is no significant pleural fluid on the right. There is no pneumothorax on either side.       1.  Possible developing atelectasis or consolidation and pleural fluid at the left lung base compared to previous. 2. Other findings, as discussed, similar to previous.       MACRO: None   Signed by: Duarte Guillory 2/23/2024 1:54 PM Dictation workstation:   [id number]    Transthoracic Echo (TTE) Limited    Result Date: 2/22/2024   [facility], [address] [place] [address]                Tel [phone] and Fax [phone] TRANSTHORACIC ECHOCARDIOGRAM REPORT  Patient Name:      MIGUEL Camara Physician:    [id number] Param BASS MD Study Date:        2/22/2024            Ordering Provider:    [id number] KIM MEHTA MRN/PID:           [id number]             Fellow: Accession#:        [id number]         Nurse: Date of Birth/Age: 1990 / 33 years  Sonographer:          Adryan Meredith RDCS Gender:            F                    Additional Staff: Height:            154.94 cm            Admit Date:           2/15/2024 Weight:            99.34 kg             Admission Status:     Inpatient -                                                               Routine BSA / BMI:         1.96 m2 / 41.38      Encounter#:           [id number]                    kg/m2                                         Department Location:  [facility] Blood Pressure: 124 /64 mmHg Study Type:    TRANSTHORACIC ECHO (TTE) LIMITED Diagnosis/ICD: Cardiogenic shock-R57.0 Indication:     cardiogenic shock CPT Code:      Echo Limited-82520; Doppler Limited-11004; Color Doppler-12604 Patient History: Pertinent History: Heart trasnplant rejection, VA ecmo 2/19, intubated 2/21,                    IABP 2/18. Study Detail: The following Echo studies were performed: 2D, M-Mode, Doppler and               color flow. Technically challenging study due to body habitus,               patient lying in supine position and ECMO. The patient is               intubated. Definity used as a contrast agent for endocardial               border definition. Total contrast used for this procedure was 3.0               mL via IV push.  PHYSICIAN INTERPRETATION: Left Ventricle: The left ventricular systolic function is severely decreased, with an estimated ejection fraction of 10%. There is global hypokinesis of the left ventricle with minor regional variations. The left ventricular cavity size is normal. Left ventricular diastolic filling was not assessed. Left Atrium: The left atrium is consistent with transplant. Right Ventricle: The right ventricle is normal in size. There is severely reduced right ventricular systolic function. Right Atrium: The right atrium is consistent with a transplant. There is a device visualized in the right atrium. Aortic Valve: The aortic valve is trileaflet. There is mild aortic valve regurgitation. Mitral Valve: The mitral valve is mildly thickened. There is mild to moderate mitral valve regurgitation. Tricuspid Valve: The tricuspid valve is structurally normal. There is mild tricuspid regurgitation. The right ventricular systolic pressure is unable to be estimated. Pulmonic Valve: The pulmonic valve is structurally normal. Pulmonic valve regurgitation was not assessed. Pericardium: There is a trivial pericardial effusion. Aorta: The aortic root is normal.  CONCLUSIONS:  1. Left ventricular systolic function is severely decreased with a 10% estimated ejection fraction.  2. There is severely  reduced right ventricular systolic function.  3. Mild to moderate mitral valve regurgitation.  4. Mild aortic valve regurgitation.  5. There is global hypokinesis of the left ventricle with minor regional variations. QUANTITATIVE DATA SUMMARY: 2D MEASUREMENTS:                          Normal Ranges: LAs:           3.50 cm   (2.7-4.0cm) IVSd:          0.80 cm   (0.6-1.1cm) LVPWd:         0.80 cm   (0.6-1.1cm) LVIDd:         4.80 cm   (3.9-5.9cm) LVIDs:         4.60 cm LV Mass Index: 64.5 g/m2 LV % FS        4.2 % TRICUSPID VALVE/RVSP:                             Normal Ranges: Peak TR Velocity: 2.36 m/s RV Syst Pressure: 25.3 mmHg (< 30mmHg)  [id number] Param Doan MD Electronically signed on 2/22/2024 at 4:13:29 PM  ** Final **     XR abdomen 1 view    Result Date: 2/21/2024  Interpreted By:  Duarte Guillory and Ogievich Taessa STUDY: XR ABDOMEN 1 VIEW;  2/21/2024 9:09 am   INDICATION: Signs/Symptoms:dobhoff.   COMPARISON: Abdominal radiograph 02/19/2024   ACCESSION NUMBER(S): [id number]   ORDERING CLINICIAN: KIM MEHTA   FINDINGS: AP semi-erect abdominal radiograph.   LINES/TUBES/DEVICES: Interval insertion of Dobhoff enteric tube with distal tip overlying the expected location of the 2nd portion of the duodenum. Partial visualization of the [place]-Estevan catheter and ECMO cannula with distal tips out of the field of view.   ABDOMEN: Pelvis is out of the field of view, within this limitation, there is mildly distended gaseous filled bowel loops. Limited evaluation of pneumoperitoneum on supine imaging, however no gross evidence of free air is noted.   LUNGS: Left basilar density likely representing edema, atelectasis, or consolidation. See same day chest x-ray.   BONE: Osseous structures demonstrate no acute bony changes.       1. Interval insertion of Dobhoff enteric tube with distal tip overlying the expected location of the 2nd portion of the duodenum. 2. Suboptimal radiograph with the pelvis out of the field  of view. Within this limitation, there is mildly distended gaseous filled bowel loops. Recommend clinical correlation with ileus.   I personally reviewed the images/study and I agree with the findings as stated by Heather Lopez DO, PGY-2. This study was interpreted at [facility], [place], [place].   MACRO: None   Signed by: Duarte Guillory 2/21/2024 10:28 AM Dictation workstation:   [id number]    Transthoracic Echo (TTE) Limited    Result Date: 2/21/2024   [facility], [address] [place] [address]                Tel [phone] and Fax [phone] TRANSTHORACIC ECHOCARDIOGRAM REPORT  Patient Name:      MIGUEL Camara Physician:    [id number] Kei BASS MD Study Date:        2/20/2024            Ordering Provider:    [id number] ZEESHAN RAHMAN MRN/PID:           [id number]             Fellow:               [id number] Zeeshan Rahman MD Accession#:        [id number]         Nurse: Date of Birth/Age: 1990 / 33 years  Sonographer:          RENETTA RAHMAN Gender:            F                    Additional Staff: Height:                                 Admit Date:           2/15/2024 Weight:                                 Admission Status:     Inpatient - STAT BSA / BMI:         m2 / kg/m2           Encounter#:           [id number]                                         Department Location:  [facility] Study Type:    TRANSTHORACIC ECHO (TTE) LIMITED Diagnosis/ICD: Heart transplant rejection-T86.21 Indication:    s/p Heart Transplant CPT Code:      Echo Limited-09538; Doppler Limited-87840; Color Doppler-57217  Study Detail: The following Echo studies were performed: 2D, M-Mode, Doppler and               color flow.  PHYSICIAN INTERPRETATION: Left Ventricle: The left  ventricular systolic function is severely decreased, with an estimated ejection fraction of 10%. There is global hypokinesis of the left ventricle with minor regional variations. The left ventricular cavity size is normal. Left ventricular diastolic filling was not assessed. Left Atrium: The left atrium is consistent with transplant. Right Ventricle: The right ventricle is normal in size. There is severely reduced right ventricular systolic function. A device is visualized in the right ventricle. Right Atrium: The right atrium is consistent with a transplant. There is a device visualized in the right atrium. Aortic Valve: The aortic valve is probably trileaflet. There is mild aortic valve regurgitation. Mitral Valve: The mitral valve is mildly thickened. There is moderate mitral valve regurgitation. Tricuspid Valve: The tricuspid valve is structurally normal. There is mild tricuspid regurgitation. The right ventricular systolic pressure is unable to be estimated. Pulmonic Valve: The pulmonic valve is not well visualized. Pulmonic valve regurgitation was not assessed. Pericardium: There is a trivial pericardial effusion. Aorta: The aortic root was not well visualized. There is no dilatation of the aortic root.  CONCLUSIONS:  1. Limited fellow echo.  2. Left ventricular systolic function is severely decreased with a 10% estimated ejection fraction.  3. There is global hypokinesis of the left ventricle with minor regional variations.  4. There is severely reduced right ventricular systolic function.  5. Mild aortic valve regurgitation.  6. Moderate mitral valve regurgitation. QUANTITATIVE DATA SUMMARY: AORTA MEASUREMENTS:                      Normal Ranges: Ao Sinus, d: 3.00 cm (2.1-3.5cm)  [id number] Kei Lozano MD Electronically signed on 2/21/2024 at 10:17:52 AM  ** Final **     XR chest 1 view    Result Date: 2/21/2024  Interpreted By:  Duarte Guillory, STUDY: XR CHEST 1 VIEW;  2/21/2024 3:27 am   INDICATION:  Signs/Symptoms:Post op cardiac surgery.   COMPARISON: 02/21/2024, 1:57 a.m..   ACCESSION NUMBER(S): [id number]   ORDERING CLINICIAN: LUBNA RIBERA   FINDINGS: Heart size is unchanged.   Again identified are midline sternotomy sutures, IABP tip along the upper descending thoracic aorta, and left internal jugular venous catheter. The tip of a  pulmonary arterial catheter most likely lies in the right pulmonary artery.   There are persistent diffuse bilateral pulmonary densities that could represent either edema or (less likely) consolidation. There may also be a left basilar pleural effusion. These findings are either similar to or minimally less prominent than on the radiograph obtained earlier the same date. There is no pneumothorax.   Endotracheal tube has been inserted. Its tip lies approximately 0.7 cm above the remedios.       1.  Bilateral pulmonary densities most likely representing edema. Small left pleural effusion. Findings are either unchanged or at most minimally improved compared to previous. 2. Interval placement of endotracheal tube with tip just above remedios.       MACRO: None   Signed by: Duarte Guillory 2/21/2024 9:09 AM Dictation workstation:   [id number]    XR chest 1 view    Result Date: 2/21/2024  Interpreted By:  Duarte Guillory and Meyers Emily STUDY: XR CHEST 1 VIEW;  2/21/2024 2:02 am   INDICATION: Signs/Symptoms:rhonchi.   COMPARISON: Chest radiograph 02/20/2024   ACCESSION NUMBER(S): [id number]   ORDERING CLINICIAN: ENRRIQUE CRISTINA   FINDINGS: AP radiograph of the chest was provided.   Right IJ approach [place]-Estevan catheter with its tip overlying the expected location of the right main pulmonary artery. Left IJ central venous catheter with its tip overlying the brachiocephalic vein. Stable appearance of an ECMO cannula overlying the expected location of the right atrium. IABP radiopaque marker again overlies the upper descending thoracic aorta. Postsurgical change of prior median sternotomy  with cerclage wires.   CARDIOMEDIASTINAL SILHOUETTE: Cardiomediastinal silhouette is normal in size and configuration.   LUNGS: Left lung base is not entirely included. Pulmonary vascularity appears more prominent than on the previous day. There also appear to be new diffuse patchy areas of pulmonary edema or consolidation (more likely the former), right-greater-than-left.   BONES: No acute osseous changes.       1. Interval development of increased pulmonary vascularity and airspace opacities, more prominent on the right. Although nonspecific, findings most likely represent pulmonary edema; pneumonia less likely but can not be entirely excluded. Recommend correlation with patient's volume status. 2. Medical lines and devices as detailed above.   I personally reviewed the images/study, and I agree with the findings as stated above. This study was interpreted at [place], [place].   MACRO: None   Signed by: Duarte Guillory 2/21/2024 9:07 AM Dictation workstation:   [id number]    XR chest 1 view    Result Date: 2/21/2024  Interpreted By:  Duarte Guillory and Jiang Sirui STUDY: XR CHEST 1 VIEW;  2/20/2024 5:49 pm   INDICATION: Signs/Symptoms:tachypnea.   COMPARISON: Chest radiograph 02/20/2024   ACCESSION NUMBER(S): [id number]   ORDERING CLINICIAN: LUBNA RIBERA   FINDINGS: AP radiograph of the chest was provided.   Stable positioning of the right IJ approach [place]-Estevan catheter, which is seen with distal tip overlying the main pulmonary artery. Left IJ central venous catheter distal tip overlying the brachiocephalic. Stable positioning of the ECMO cannula overlying the upper right atrium. Stable positioning of the IABP with tip at approximate level of the left hilum. Prior median sternotomy with intact sternal cerclage wires. Abandoned epicardial pacer wires are noted.   CARDIOMEDIASTINAL SILHOUETTE: Cardiomediastinal silhouette is stable in size and configuration.   LUNGS:  Suggestion of a left-sided pleural effusion. No consolidation or pneumothorax.   ABDOMEN: No remarkable upper abdominal findings.   BONES: No acute osseous changes.       1.  Suggestion of small left-sided pleural effusion and bibasilar atelectasis. No pneumothorax. 2.  Medical devices as above.   I personally reviewed the image(s) / study and I agree with the findings as stated by Gera Pryor MD. This study was interpreted at [facility], [place], [place].   MACRO: None   Signed by: Duarte Guillory 2/21/2024 9:05 AM Dictation workstation:   [id number]    XR chest 1 view    Result Date: 2/20/2024  Interpreted By:  Duarte Guillory,  Nevin Romero STUDY: XR CHEST 1 VIEW;  2/20/2024 3:18 pm   INDICATION: Signs/Symptoms:s/p line placement. Admitted to CTICU for cardiogenic shock. Started on VA ECMO cannulation 2/19.   COMPARISON: Chest x-ray 02/20/2021, 02/19/2024, 2/18/2024, 02/17/2024   ACCESSION NUMBER(S): [id number]   ORDERING CLINICIAN: LUBNA RIBERA   FINDINGS: AP semi-erect radiograph of the chest   LINES/TUBES/DEVICES: Stable postsurgical changes of median sternotomy with sternotomy wires intact and surgical clips overlying the mediastinum. Left IJ central venous catheter with distal tip overlying the expected location of the left brachiocephalic vein. Right-sided IJ approach [place]-Estevan catheter is visualized with the distal tip projecting over the expected region of the right main pulmonary artery. Tip of the intra-aortic balloon pump projects over the expected location of the aortic arch, similar to prior. Partially visualized VA ECMO cannulation with distal tip overlying T7 vertebral body, similar to prior.   CARDIOMEDIASTINAL SILHOUETTE: Enlarged cardiomediastinal silhouette is stable in size and configuration when compared to prior.   LUNGS: Asymmetrical elevation of the right hemidiaphragm relative to the left. Similar mild right perihilar streaky opacity favored to represent atelectasis  or consolidation. No sizable pleural effusion or significant pneumothorax.   BONES: No acute osseous abnormality.       1. Medical devices described above. 2. Similar mild right perihilar streaky opacity favored to represent atelectasis or consolidation. 3. Stable cardiomegaly.   I personally reviewed the images/study and I agree with the findings as stated by Heather Lopez DO, PGY-2. This study was interpreted at [facility], [place], [place].   Signed by: Duarte Guillory 2/20/2024 3:53 PM Dictation workstation:   [id number]    Cardiac catheterization - non-coronary    Result Date: 2/20/2024   [facility] Cath Lab, [address] [place] [address] Cardiovascular Catheterization Report Patient Name:      MIGUEL BASS Performing Physician:  [id number] Lexie Wright MD Study Date:        2/20/2024             Verifying Physician:   Emily Wright MD MRN/PID:           [id number]              Cardiologist/Co-scrub: Accession#:        [id number]          Ordering Physician:    [id number] J BORGES Date of Birth/Age: 1990 / 33 years   Fellow: Gender:            F                     Fellow: Encounter#:        [id number]  Study:            Endomyocardial Biopsy Additional Study: Other  Indications: Procedure performed to evaluate a recipient of a cardiac transplant.  Procedure Description Comments: Patient arrived to lab with a 9 Fr right IJ sheath. I attempted to use this sheath to perform biopsy but due to ECMO cannula in right atrium I was unable to advance it appropriately. I replaced the 9 Fr sheath with a long braided sheath, and advanced the tip of this sheath to the mid right ventricle. Through this I obtained biopsy samples. I then replaced the long sheath with a standard 9 Fr sheath and floated a swan for use in the  LINDA  Hemo Personnel: +-------------------+---------+ Name               Duty      +-------------------+---------+ Lexie Wright MD 1 +-------------------+---------+  Hemodynamic Pressures:  +----+---------------------+----------+-------------+--------------+---------+ Site      Date Time      Phase NameSystolic mmHgDiastolic mmHgMean mmHg +----+---------------------+----------+-------------+--------------+---------+  Art2/20/2024 12:09:29 PM      Rest          123            75       94 +----+---------------------+----------+-------------+--------------+---------+  Oxygen Saturation %: +-----------+----------+------------+ Sample SiteO2 Sat (%)HB (g/100ml) +-----------+----------+------------+          FA        99         7.9 +-----------+----------+------------+          PA        75         7.9 +-----------+----------+------------+  Cardiac Outputs: +---------------+------------------+-------+ LISA CO (l/min)LISA CI (l/min/m2)LISA SV +---------------+------------------+-------+             9.7               4.8   84.0 +---------------+------------------+-------+  Complications: No in-lab complications observed.  Cardiac Cath Post Procedure Notes: Post Procedure           Heart transplant rejection. Diagnosis: Blood Loss:              Estimated blood loss during the procedure was Minimal                          mls. Specimens Removed:       Number of specimen(s) removed: 4. ____________________________________________________________________________________ CONCLUSIONS:  1. Endomyocardial biopsy performed and 4 samples sent to Pathology.  2. New swan placed in right IJ for ICU use. ICD 10 Codes: Heart transplant rejection-T86.21  CPT Codes: Moderate Sedation Services 2nd additional 15 minutes patient >5 years-85503; Moderate Sedation Services 1st additional 15 minutes patient >5 years-81663; Moderate Sedation Services 3rd additional 15 minutes patient >5  years-98887; Endomyocardial Biopsy-45535  [id number] Lexie Wright MD Performing Physician Electronically signed by [id number] Lexie Wright MD on 2/20/2024 at 3:23:48 PM  ** Final **          Assessment/Plan   Charla Bowles is a 35 yo heart transplant recpient (3/2023) who presented on 2/15/24 with signs of acute cellular rejection on heart biopsy 2/16  and multiorgan dysfunction in the setting of ADHF. Multiple pressors started on admission. IABP started on 2/18.  Was on VA-ECMO 2/19-  , the impella 5.5 currently on P8. She has received pulse steroids, was maintained on tacrolimus/sirolimus, 2 sessions of IVIG/plasmapheresis on 2/18, 2/20 and 2 doses of Thymoglobulin 2/18 and 2/19.  Following an endomyocardial biopsy 2/20/2024 which was negative for rejection (in the setting of negative DSAs) - the Thymoglobulin/IVIG/plasmapheresis sessions were stopped. Then treated again with 5 treatments of IVIG/plasmapheresis from 3/5/24. We are continuing to follow up for symptom management and support; and eventual GOC but will hold off till next week, hoping she can improve.      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 possible SLE cardiomyopathy)  #CHF with severely reduced EF s/p ICD  #Acute on chronic CHF requiring inotropic support  #Pulmonary HTN  #SLE  #Hypothyroidism  #Nausea  #Hemorrhoids     #Acute Back Pain   - Low back pain and B/L knee pain; reporting 10/10 pain  -Pt appears drowsy at time of exam, A&Ox2-3 however repeating certain phrases. Low suspicion that AMS is related to opioid use.    -Recommend reducing dose frequency of oxycodone 5 mg PO to q6h PRN. Pt has had 4 doses in the past 24 hours (Total OME = 25)  - Recommend reinitiating Dilaudid 0.2 mg IV q4h PRN for severe pain  - Recommend continuing Tylenol 975 mg TID instead of PRN. Monitor LFTs closely  - Recommend reducing cyclobenzapine 5 mg to BID for low back pain. This was reviewed with transplant  pharmacist and confirmed adequate dosing given renal and hepatic impairment; and reduced given reinitiation of IV Dilaudid   - Recommend lidocaine patch q12h for pain    #Altered mental status  -Initial concern of drowsiness r/t opioid use   -Etiology unclear, ?metabolic imbalance vs hepatic/renal impairment, medication interactions  -If drowsiness/altered mental status persists without adequate etiology identified, can reduce trazodone from 50 mg PO at bedtime to 25 mg PO  -Low threshold to discontinue cyclobenzprine 5 mg PO BID if drowsiness continues with concurrent use of Dilaudid IV and PO oxycodone        #psychosocial support  - Music therapy  - Spiritual care support  - Art therapy        Thank you for allowing us to care for this patient. Palliative Team will continue to follow as needed. Please contact team with any questions or concerns.     JIMMY Johnson-CNP   Team pager [phone] (weekdays)    I spent 60 minutes in the care of this patient which included chart review, interviewing patient/family, discussion with primary team, coordination of care, and documentation.    Medical Decision Making was high level due to high complexity of problems, extensive data review, and high risk of management/treatment.

## 2024-03-27 NOTE — PROGRESS NOTES
[facility]  HFICU PROGRESS NOTE    Charla Bowles/[id number]    Admit Date: 2/15/2024  Hospital Length of Stay: 41   ICU Length of Stay: 17d   Primary Service: HFICU  Primary HF Cardiologist: Dr. Cornell  Referring: Dr Cornell     INTERVAL EVENTS / PERTINENT ROS:     Overnight continues on Impella P6 support, Dobutamine drip at 5 mcg/kg/min, Epinephrine gtt @ 0.02 mcg/kg/min and CVVHD, with net negative 1.3 L last 24 hours.   Her mentation has declined since yesterday 03/26. Throughout today, her lactate has been increasing, SvO2 decreasing, worsening cardiac function, worsening mental status. Inotropic requirements increasing (Dobutamine increased to 7.5 mcg/kg/min and Epinephrine uptitrated to 0.1 mcg/kg/min) and pressor (Levo) support added.   Decision made by multidisciplinary team to take her to the operating room for ECMO cannulation / therapy.     PLAN:   - ECMO cannulation in OR  - CVVH discontinued for OR  - Admit to CTICU post OR        MEDICATIONS  Infusions:  DOBUTamine, Last Rate: 7.5 mcg/kg/min (03/27/24 1400)  EPINEPHrine, Last Rate: 0.12 mcg/kg/min (03/27/24 1500)  norepinephrine, Last Rate: 0.08 mcg/kg/min (03/27/24 1502)  PrismaSol 4/2.5, Last Rate: 25 mL/kg/hr (03/27/24 1400)  sodium bicarbonate infusion Impella Purge 25 mEq/1000 mL D5W, Last Rate: 10 mL/hr (03/27/24 1400)      Scheduled:  [MAR Hold] acetaminophen, 975 mg, q8h  [Held by provider] aspirin, 81 mg, Daily  [MAR Hold] calcitriol, 0.5 mcg, Daily  [Held by provider] cyclobenzaprine, 5 mg, BID  [MAR Hold] darbepoetin floyd, 100 mcg, q14 days  [MAR Hold] ferrous sulfate (325 mg ferrous sulfate), 65 mg of iron, Daily with breakfast  [MAR Hold] folic acid, 1 mg, Daily  heparin, ,   [MAR Hold] insulin lispro, 0-10 Units, Before meals & nightly  [MAR Hold] levothyroxine, 200 mcg, Daily  [MAR Hold] lidocaine, 1 patch, Daily  [MAR Hold] melatonin, 10 mg, Nightly  [MAR Hold] multivitamin with minerals, 1  tablet, Daily  [MAR Hold] mycophenolate, 180 mg, BID  [MAR Hold] nystatin, 5 mL, q6h  [MAR Hold] pantoprazole, 40 mg, BID AC  [MAR Hold] perflutren lipid microspheres, 0.5-10 mL of dilution, Once in imaging  [MAR Hold] perflutren protein A microsphere, 0.5 mL, Once in imaging  phenylephrine HCl in 0.9% NaCl, ,   [MAR Hold] predniSONE, 10 mg, Daily  [Held by provider] rosuvastatin, 40 mg, Nightly  [MAR Hold] sennosides-docusate sodium, 1 tablet, BID  [Held by provider] sulfamethoxazole-trimethoprim, 80 mg of trimethoprim, Daily  [MAR Hold] sulfur hexafluoride microsphr, 2 mL, Once in imaging  [MAR Hold] tacrolimus, 1 mg, q24h  [MAR Hold] tacrolimus, 1 mg, q24h  [Held by provider] traZODone, 25 mg, Nightly  [MAR Hold] valGANciclovir, 450 mg, q48h  vasopressin, ,       PRN:  [MAR Hold] benzocaine-menthol, 1 lozenge, q2h PRN  [MAR Hold] dextrose, 25 g, q15 min PRN  [MAR Hold] dextrose, 25 g, q15 min PRN   Or  [MAR Hold] glucagon, 1 mg, q15 min PRN  [MAR Hold] diphenhydrAMINE, 25 mg, q5 min PRN  [MAR Hold] glucagon, 1 mg, q15 min PRN  [MAR Hold] guaiFENesin, 600 mg, BID PRN  heparin, ,   [MAR Hold] HYDROmorphone, 0.2 mg, q2h PRN  [MAR Hold] artificial tears, 2 drop, PRN  [MAR Hold] microfibrllar collagen, , PRN  [MAR Hold] mupirocin, , BID PRN  [MAR Hold] ondansetron, 4 mg, q4h PRN  [MAR Hold] oxyCODONE, 5 mg, q6h PRN  phenylephrine HCl in 0.9% NaCl, ,   [MAR Hold] polyethylene glycol, 17 g, Daily PRN  [MAR Hold] sodium chloride, 1 spray, 4x daily PRN  vasopressin, ,         Impella:      Most Recent Range Past 24hrs   Performance Level 6 P Level  Min: 6   Min taken time: 03/27/24 1400  Max: 6   Max taken time: 03/27/24 1400   Flow (L/min) 3 Flow (L/min)  Min: 1.8   Min taken time: 03/27/24 0300  Max: 3.5   Max taken time: 03/26/24 1800   Motor Current 305/242 Motor Current  Min: 305/242   Min taken time: 03/27/24 1400  Max: 339/245   Max taken time: 03/26/24 2300   Placement Signal Yes  Placement OK could not be  "evaluated. This SmartLink does not work with rows of the type: Custom List   Purge (mmHg) 450 Purge Pressure (mmHg)  Min: 400   Min taken time: 03/26/24 1800  Max: 600   Max taken time: 03/27/24 1200   Purge rate (mL/hr) 11.2 Purge Rate (mL/hr)  Min: 11.2   Min taken time: 03/27/24 1400  Max: 12.4   Max taken time: 03/27/24 0100       PHYSICAL EXAM:   Visit Vitals  /81   Pulse 98   Temp 36 °C (96.8 °F) (Temporal)   Resp 25   Ht 1.549 m (5' 0.98\")   Wt 96 kg (211 lb 10.3 oz)   SpO2 (!) 47%   BMI 40.01 kg/m²   OB Status Hysterectomy   Smoking Status Never   BSA 2.03 m²     Wt Readings from Last 5 Encounters:   03/26/24 96 kg (211 lb 10.3 oz)   12/07/23 92.1 kg (203 lb)   12/01/23 93 kg (205 lb)   11/29/23 92.9 kg (204 lb 12.8 oz)   11/09/23 91.3 kg (201 lb 3.2 oz)     INTAKE/OUTPUT:  I/O last 3 completed shifts:  In: 1149.7 (11.5 mL/kg) [I.V.:699.7 (7 mL/kg); Blood:350; IV Piggyback:100]  Out: 3253 (32.7 mL/kg) [Other:3253]  Dosing Weight: 99.6 kg        Physical Exam  Constitutional:       General: She is not in acute distress.     Appearance: Normal appearance. She is ill-appearing and toxic-appearing.   HENT:      Head: Normocephalic.      Mouth/Throat:      Mouth: Mucous membranes are moist.   Eyes:      Extraocular Movements: Extraocular movements intact.      Pupils: Pupils are equal, round, and reactive to light.   Neck:      Comments: RIJ dialysis line present - CDI  Cardiovascular:      Rate and Rhythm: Regular rhythm. Tachycardia present.      Pulses: Normal pulses.      Heart sounds: Normal heart sounds.   Pulmonary:      Effort: Pulmonary effort is normal. No respiratory distress.      Breath sounds: Normal breath sounds.   Chest:      Comments: Right axillary impella site CDI   Abdominal:      General: Bowel sounds are normal.      Palpations: Abdomen is soft.      Tenderness: There is no abdominal tenderness.   Musculoskeletal:         General: Normal range of motion.      Cervical back: Normal " range of motion.      Right lower leg: Edema present.      Left lower leg: Edema present.   Skin:     General: Skin is warm and dry.      Capillary Refill: Capillary refill takes 2 to 3 seconds.      Coloration: Skin is jaundiced.      Comments: Left groin ECMO cannulation site with drainage    Neurological:      General: No focal deficit present.      Mental Status: She is alert and oriented to person, place, and time.   Psychiatric:         Behavior: Behavior normal. Behavior is cooperative.       DATA:  CMP:  Results from last 7 days   Lab Units 03/27/24  1020 03/27/24  0351 03/26/24  1513 03/26/24  0532 03/25/24 2010 03/25/24  1613 03/25/24  0559 03/24/24  0558 03/23/24 2000 03/23/24  0610 03/22/24  0609 03/21/24  0609   SODIUM mmol/L 135* 133* 133* 133* 132* 132* 133*  133* 131* 132* 134* 129* 134*   POTASSIUM mmol/L 4.7 4.6 4.9 5.1 4.5 4.6 4.7  4.7 4.5 4.3 3.7 4.5 4.2   CHLORIDE mmol/L 99 98 98 98 98 97* 96*  96* 96* 97* 97* 94* 98   CO2 mmol/L 26 25 23 22 25 24 22  22 23 23 23 21 23   ANION GAP mmol/L 15 15 17 18 14 16 20  20 17 16 18 19 17   BUN mg/dL 14 15 17 18 23 22 25*  25* 26* 22 12 25* 14   CREATININE mg/dL 1.09* 1.12* 1.26* 1.63* 1.94* 2.02* 2.29*  2.29* 3.01* 2.32* 1.55* 3.07* 1.98*   EGFR mL/min/1.73m*2 69 67 58* 43* 35* 33* 28*  28* 20* 28* 45* 20* 34*   MAGNESIUM mg/dL  --  2.62*  --  2.53* 2.29  --  2.40 2.31 2.21 1.91 2.12 2.11   ALBUMIN g/dL 3.3* 3.5 3.5 3.4 3.2* 3.4 3.5 3.3* 3.5 3.4 3.4 3.5   ALT U/L 243* 252* 264* 264* 256* 262* 275* 220* 198* 157* 86* 59*   AST U/L 411* 413* 448* 492* 498* 532* 566* 534* 505* 408* 192* 102*   BILIRUBIN TOTAL mg/dL 4.0* 3.6* 3.9* 3.7* 3.7* 4.3* 4.3* 3.1* 2.8* 3.2* 2.8* 1.8*     CBC:  Results from last 7 days   Lab Units 03/27/24  0351 03/26/24  0532 03/25/24 2010 03/25/24  0559 03/24/24  0048 03/23/24  0610 03/22/24  0609 03/21/24  0609   WBC AUTO x10*3/uL 6.7 6.9 6.5 6.7 8.9 10.1 8.2 7.2   HEMOGLOBIN g/dL 8.5* 8.9* 7.2* 8.1* 8.3* 8.2* 8.3* 8.1*    HEMATOCRIT % 25.6* 26.3* 22.0* 25.7* 26.7* 26.3* 25.8* 24.9*   PLATELETS AUTO x10*3/uL 64* 60* 94* 60* 67* 49* 51* 44*   MCV fL 91 91 94 99 101* 98 93 94     COAG:   Results from last 7 days   Lab Units 03/27/24  1429 03/27/24  0351 03/26/24  0532 03/25/24 2010 03/25/24  0559 03/24/24  0048 03/23/24  0610 03/22/24  0609   INR  2.5* 2.7* 2.9* 3.2* 2.8* 2.5* 2.3* 2.0*     ABO:   ABO TYPE   Date Value Ref Range Status   03/25/2024 O  Final         HEME/ENDO:           CARDIAC:   Results from last 7 days   Lab Units 03/27/24  0351 03/26/24  1513 03/26/24  0532 03/25/24 2010 03/25/24  1613 03/25/24  0559 03/24/24  0558 03/23/24 2000   LD U/L 1,360* 1,333* 1,548* 1,571* 1,691* 1,656* 1,714* 1,786*       ASSESSMENT AND PLAN:   Charla Bowles is a 32 y/o F with PMHx of heart transplant in March 2022 with postoperative course c/b upper extremity/internal jugular DVTs, and 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 She was admitted to HFICU on 2/15 with concern for cardiogenic shock 2/2 allograft rejection and decompensated heart failure with multiorgan dysfunction including significant elevation of liver enzymes and nonoliguric acute kidney injury. Endomyocardial biopsy on 2/16 revealed mild ACR with +CD4s and negative HLAs, however multisystem organ failure persisted with increased inotropic requirements. IABP placed on 2/18. Transferred to CTICU on 2/19 for [facility] ECMO cannulation with Dr. Marina for persistent multi-organ system dysfunction. Intubated 2/21 for respiratory failure 2/2 pulmonary edema, extubated 2/24. ECMO de-cannulated 2/29/24 but had worsening HIRAM requiring CRRT and overall declined clinical status and IABP was transitioned to R axillary impella 5.5 on 3/1. Transferred from CTICU to HFICU on 3/10 for further management. Continued on milrinone gtt @ 0.25 mcg/kg/min and impella support decreased to P-level 5 on 3/11. Completed PLEX/IVIG on 3/13. Went for RHC 3/13: RA: 16, RV: 43/1, PA: 43/12, PCWP: 23,  PA-SAT: 47%, CO: 5.14, CI: 2.64 on P5 of Impella 5.5 and milrinone 0.25 mcg/kg/min. Impella was increased to P6 shortly after, and overnight 3/14 patient's mixed venous dropped from 58->32, CXR was ordered and SGC was in appropriate position. Repeat mixed venous confirmed low value of 37. Impella increased to P8 and stat ECHO ordered 3/14. WBC continued to down trend 3/14 which prompted new infectious workup to be sent, and patient to be started on broad spectrum abx. Patient transitioned to tablo from iHD for better volume removal given elevated CVPs 3/14. 03/15: Malpositioned Impella adjusted at the bedside under echo by Dr. Marina and Dr. Melo. Impella pulled back ~ 1 - 2 cm and confirmed placement, pushed back in 1 cm in the evening by Dr Lewis for placement alarms. [place] removed 3/16 and P level decreased to 6 due to continued high hemolysis. 3/17: LDH has plateaued @ 2111. CT scan chest, abd, pelvis complete w/o acute ABD. 3/18: Impella purge solution transitioned from sodium bicarb to heparin solution. 3/19: Milrinone gtt discontinued and transitioned to Epinephrine gtt to improve blood pressures. Initiated Myfortic low dose BID, discontinued Sirolimus, increased Tacrolimus to 2 mg BID (goal: 8 - 10). Received dialysis with SLED 3/19 and 3/20. Trialed diuretic challenge 3/21 which was unsuccessful. P level increased to 7 on 3/21 given lactate. 3/23: P level decreased to P6 given labs were concerning for hemolysis. Afternoon dose of myfortic also held given patients abdominal discomfort. Tacrolimus decreased to 2 mg at 1830. 3/24 myfortic was restarted at 180 mg BID. 03/25: Impella repositioned 3/25 by Dr. Caruso under Echo guided.  Post re-position, lactate down, Dobutamine initiated @ 2.5 mcg/kg/min, and increased to 5 mcg/kg/min for cardiac support.  Per IR attending (3/26) :Based on the patients access options it appears that even we would have to do a femoral line so for this sick patient makes more  sense to just do all of these procedures bedside.  Cancelled IR HD line placement, Dr. Toussaint aware. 03/27: Clinical deterioration throughout the day requiring uptitration of dual inotrope therapy and addition of norepinephrine. Her lactate continued to rise throughout the day. Taken to the OR for ECMO cannulation 2/2 worsening lactic acidosis, hypotension, cardiogenic shock.     NEURO:   #Acute Pain   #Insomnia  - C/w tylenol 975 mg q8 for mild pain   - C/w oxycodone 5 mg q4 PRN for moderate pain   - D/c'd dilaudid 0.2 mg q2 PRN per palliative  - C/w lidocaine patch for lower back pain   - C/w melatonin 10 mg nightly   - C/w trazodone 50 mg nightly for insomnia  - PT/OT   - CAM ICU score q shift  - Sleep/wake cycle hygiene  - Serial neuro and pain assessments     ENT:  #Epistaxis (resolved)  - Significant epistaxis 2/28 and 3/3 requiring ENT consult, packing removed by ENT 3/5  - S/p ocean spray 5x day x10 days completed   - Ocean spray and mupiricin PRN for dry nasal passages     CARDIAC:  #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)  Toxo: -/-   Hep C: -/-     Rejection/Prophylaxis (transplant):  - Steroids: prednisone 10 mg daily   - Tacrolimus 1.0 mg BID  w/ daily levels drawn @ 0600   - Sirolimus discontinued 3/19   - Continue Myfortic 180 mg BID given abdominal pain per patient (started 3/19, increased dose 3/21 and patient was experiencing worsening abdominal pain)  - Tacrolimus goal troughs: 8-10, daily level (3/27): 14.4   - Antifungals: nystatin oral suspension 5 mls q6  - Antivirals: valcyte 450 mg q 48 due to low grade viremia   - Holding anti PCP & toxoplasmosis: Bactrim SS daily 2/2 thrombocytopenia (2/23)     Last cardiac biopsy: 2/16/24 with ACR1 and no AMR  Last HLA (2/16/24): negative for DSAs   Last RHC (3/13/24): RA: 16, RV: 43/1, PA: 43/12, PCWP: 23, PA-SAT: 47%, CO: 5.14, CI: 2.64 on P5 of Impella 5.5 and milrinone  0.25 mcg/kg/min   Last LHC (2/18/24): negative for CAV and CAD   Last TTE/BOB (3/22/24): LVEF 15-20%, moderately enlarged RV, severely reduced RV systolic function, impella inflow cannula is visualized in the LV apex, impella tip 4.3 cm from aortic annulus   Osteopenia/osteoporosis prophylaxis: Vitamin D3 and calcium supplements  Peptic/gastric ulcer prophylaxis: Pantoprazole 40 mg daily   CAV Prophylaxis: Holding aspirin 81 mg daily 2/2 thrombocytopenia & holding rosuvastatin 40 mg nightly 2/2 transaminitis     - Unclear cause of acute severe graft dysfunction. Most likely smoldering ACR vs. AMR; however, 2x allograft biopsies on 2/16 & 2/20 remain negative for any significant pathology. Notably, both biopsies taken after rejection therapies implemented which may have reduced areas of graft damage.    - DSAs remain negative; however, patient may have non-HLA antibodies present. 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   - Negative CAV & CAD via LHC on 2/18/24   - Completed methylpred steroid pulse w/ 1g q24 x 3 days (2/16-2/18)  - Thymoglobulin doses: 2/18 & 2/19   - IVIG + PLEX Sessions completed: 2/18, 2/20, 3/7, 3/11, 3/13 (completed)  - Graft function slightly worse after transition to impella 5.5 on 3/1. 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  #Impella 5.5 support (3/1/24)  RHC (3/13/24): RA: 16, RV: 43/1, PA: 43/12, PCWP: 23, PA-SAT: 47%, CO: 5.14, CI: 2.64 on P5 of Impella 5.5, milrinone 0.25 mcg/kg/min, hydralazine 100 mg q8, isordil 40 mg q8  Opening SGC #s (3/13): BP 94/71 (79), PAP 42/30 (35), CVP 13, , CO/CI (kyra) 5.46/2.80, SVO2 56% on P5 of Impella 5.5, milrinone 0.25 mcg/kg/min, hydralazine 100 mg q8, isordil 40 mg q8  Closing SGC #s (3/16): /86 (97), CVP 20, PAP 35/25 (42), SVR 1115, CO/CI (kyra) 5.5/2.8, SVO2 51% on P7 of Impella 5.5,  milrinone 0.25 mcg/kg/min, hydralazine 50 mg q8, isordil 20 mg q8  Milrinone discontinued 3/19  - Maintain goal MAP 70-90  - C/w Impella to P6 (last weaned from P7 3/23 2/2 labs more concerning for hemolysis)  -  Impella repositioned 3/25 by Dr. Caruso under Echo guided.  Post re-position, lactate down  - Transitioned to sodium bicarb purge 2/2 elevated INR in the setting of hepatic congestion. 03/27  - C/w epinephrine gtt for improved blood pressure to increase fluid removal (3/19)  - Trending lactate, LDH   - Volume removal with dialysis  - Worsening cardiogenic shock (03/27) - Take to OR for ECMO cannulation / therapy    #Advanced therapy evaluation  - Presented to committee 3/12/2024 - concern for end organ failure (possible heart / kidney)   - Not a candidate for transplant at this time per committee discussion 3/12/24  - Discussed in selection committee meeting 03/19 -   (1) likelihood of cardiac recovery s/p graft failure seems less likely as time goes by  (2) she may be a candidate for re-do cardiac transplantation, but she has high risk features (kidney failure, congestion, prior failure of immunosuppression)  (3) per UNOS guidelines she would not be a candidate for combined H/K listing until 6 weeks have passed since kidney failure episode started.      PULM:   #Acute Hypoxic Respiratory Failure 2/2 pulmonary edema (resolved)  ETT (2/21-2/24)  Remains on RA   - Maintain SpO2 > 92%     GI:   #Abdominal Pain, thought to be 2/2 myfortic dose increase (resolved)  #Nausea  #Emesis (dark blood - clots) (resolved)  Repeat CT chest/abd/pelvis w/o contrast (3/17): no acute abdomen  Patient had emesis x1 with dark blood in vomit -> GI consult placed 3/17 - signed off (protonix gtt for 3 days, d/tri on 3/20)  C diff (3/17): negative  - C/w zofran 4 mg q4 PRN for nausea  - Bowel regimen: Patti-Colace BID + miralax daily PRN   - PPI BID per GI   - Follow up stool samples: H pylori (PENDING), fecal calprotectin  (PENDING), fecal lactoferrin (Positive 03/23/24)  - GI following, appreciate assistance     #Hx of gastric bypass   #Hx of MMF colitis  #Acute transaminitis   - Continue PPI, calcitriol 0.5 mg daily, multivitamin  - Trend LFTs daily      :   #Acute Renal Failure 2/2 to cardiorenal syndrome (on dialysis), anuric   Baseline Cr 1.2-1.3  S/p diuretic challenge 3/21 without response   - RFP daily and PRN  - Continue for CVVHD 3/25  - Will need tunneled dialysis line - holding until more stable   - Transplant nephrology following closely, appreciate assistance     ENDO:   #T2DM  Steroid induced hyperglycemia acceptable glycemic control on SSI  - Maintain BG <180 with hypoglycemia protocol  - C/w SSI     #Hypothyroidism  TSH (3/17): 20.74, T4 1.11, T3: 1.4    - C/w synthroid 200 mcg daily      HEME:   #Acute DVT LIJ and SVT left cephalic vein and right cephalic vein   #Iron deficiency anemia  #Acute blood loss anemia   #Multiple transfusions   #Hemolysis   UE and LE Venous duplex (3/6/24): right acute occlusive superficial venous thrombosis visualized in the mid cephalic and acute occlusive superficial venous thrombosis visualized in the distal cephalic veins, left acute non-occlusive deep vein thrombosis visualized in the internal jugular and acute non-occlusive superficial venous thrombosis visualized in the mid cephalic veins   UE and LE Venous duplex (3/12): unchanged from prior  Last type and screen: 3/15  - 3/15: Transfused 1 unit leukocyte reduced PRBC's   - 3/16: Transfused 1 unit leukocyte reduced PRBC's   - Heparin impella purge check ACT Q daily   - ASA on hold d/t low platelet count   - Continue SCDs for DVT prophylaxis  - Continue Aranesp every 2 weeks  - Vascular medicine signed off 3/13, will need discussion regarding long term anticoagulation closer to discharge   - Consulted hematology regarding DIC / ALBERT: unlikely per hematology (3/17)  - Per hematology, trend daily T/D-bilirubin, LDH, haptoglobin,  reticulocyte count, fibrinogen, PT/PTT/INR, D-dimer  - [id number] sent per heme recs: 43- the tacrolimus level has been adjusted recently and there may be a tacro-induced TMA.     ID:   #Leukopenia, likely in the setting of IVIG vs infectious process (resolved)  Blood cultures (2/15): NGTD  Wound culture (3/15): NGTD  S/p vancomycin + zosyn (3/14-3/17)  S/p diflucan 200 mg x1 (3/21) given concern for yeast infection   Afebrile, nontoxic  - Left groin ECMO cannulation site - wound no longer draining - wound care evaluated - No growth from culture   - Trend temp q4h     PHYSICAL AND OCCUPATIONAL THERAPY: ordered and following     LINES:  PIVs  RIJ trialysis catheter 3/5 (discussed line in rounds this AM as patient has no other access)   R Axillary Impella 3/1  Per IR: Based on the patients access options it appears that even we would have to do a femoral line so for this sick patient makes more sense to just do all of these procedures bedside.       DVT: SCDs  VAP BUNDLE: N/A  ULCER PPX: PPI  GLYCEMIC CONTROL: SSI  BOWEL CARE: Patti-colace, miralax    INDWELLING CATHETER: N/A  NUTRITION: Adult diet Regular      EMERGENCY CONTACT: Extended Emergency Contact Information  Primary Emergency Contact: SAHIL DIMAS  Address: [address] [place] [address]  Home Phone: [phone]  Mobile Phone: [phone]  Relation: Mother  FAMILY UPDATE: Updated daily   CODE STATUS: Full Code  DISPO: Remain in HFICU    Patient seen and assessed with Dr. Toussaint  _________________________________________________  JIMMY Vargas-CNP    I was present and participated with the care of the patient.  I have personally seen and re-examined the patient and agree with assessment and the plan.     Jaky Diggs CNP

## 2024-03-27 NOTE — PROGRESS NOTES
HFICU Attending Note    Principal Problem:    Cardiogenic shock (CMS/HCC)  Active Problems:    Heart transplant recipient (CMS/HCC)    ESRD (end stage renal disease) on dialysis (CMS/HCC)    HIRAM (acute kidney injury) (CMS/HCC)    Acute passive congestion of liver    Hyponatremia    Iron deficiency anemia    Abdominal pain    Systolic congestive heart failure (CMS/HCC)    Cardiac transplant rejection (CMS/HCC)    Acute combined systolic (congestive) and diastolic (congestive) heart failure (CMS/HCC)    Progressive hemodynamic deterioration over the course of the day as evidenced by altered mentation, rising lactate, escalating vasopressor need.  Decision made to optimize circulatory support, and VA ECMO has been reinitiated.    Mom at bedside, aware of events.    Will discuss with our transplant nephrology colleagues about possibly expediting kidney transplantation listing.  Heart kidney transplantation continues to be a therapeutic target.    This critically ill patient continues to be at-risk for clinically significant deterioration / failure due to the above mentioned dysfunctional, unstable organ systems.  I have personally identified and managed all complex critical care issues to prevent aforementioned clinical deterioration.  Critical care time is spent at bedside and/or the immediate area and has included, but is not limited to, the review of diagnostic tests, labs, radiographs, serial assessments of hemodynamics, respiratory status, ventilatory management, and family updates.  Time spent in procedures and teaching are reported separately.    Critical care time: 120 minutes

## 2024-03-27 NOTE — ANESTHESIA POSTPROCEDURE EVALUATION
Patient: Charla Bowles    Procedure Summary       Date: 03/27/24 Room / Location: [facility] OR 21 / [facility] OR    Anesthesia Start: 1550 Anesthesia Stop: 1911    Procedure: Placement Extracorporeal Membrane Oxygenation (Groin) Diagnosis:       Cardiogenic shock (CMS/HCC)      (Cardiogenic shock (CMS/HCC) [R57.0])    Surgeons: Ruben Booker MD Responsible Provider: Yessenia Blackburn MD    Anesthesia Type: general ASA Status: 4 - Emergent            Anesthesia Type: general    Vitals Value Taken Time   /60 03/27/24 1935   Temp 36 03/27/24 1935   Pulse 90 03/27/24 1935   Resp  03/27/24 1935   SpO2 100 03/27/24 1935       Anesthesia Post Evaluation    Patient location during evaluation: ICU  Patient participation: complete - patient cannot participate  Level of consciousness: sedated  Pain management: adequate  Airway patency: patent (intubated)  Cardiovascular status: acceptable  Respiratory status: acceptable  Hydration status: acceptable  Postoperative Nausea and Vomiting: none        No notable events documented.

## 2024-03-27 NOTE — PROGRESS NOTES
"        INPATIENT TRANSPLANT NEPHROLOGY PROGRESS NOTE          REASON FOR CONSULT:  Immunosuppressive medication management and nephrology related issues.    SUBJECTION:  CVVH Procedure note    Getting Echo this am.  Tolerated CVVH well  Discussed with heart failure team   No acute event overnight.    PHYCISCAL EXAMINATION:    Visit Vitals  BP 97/82 (BP Location: Left arm, Patient Position: Lying)   Pulse 97   Temp 36 °C (96.8 °F) (Temporal)   Resp 19   Ht 1.549 m (5' 0.98\")   Wt 96 kg (211 lb 10.3 oz)   SpO2 (!) 81%   BMI 40.01 kg/m²   OB Status Hysterectomy   Smoking Status Never   BSA 2.03 m²        03/25 1900 - 03/27 0659  In: 1149.7 [I.V.:699.7]  Out: 3253      Weight change:     Constitutional:       General: She is not in acute distress.     Appearance: Normal appearance. She is ill-appearing and toxic-appearing.   HENT:      Head: Normocephalic.      Mouth/Throat:      Mouth: Mucous membranes are moist.   Eyes:      Extraocular Movements: Extraocular movements intact.      Pupils: Pupils are equal, round, and reactive to light.   Neck:      Comments: RIJ dialysis line present - CDI  Cardiovascular:      Rate and Rhythm: Regular rhythm. Tachycardia present.      Pulses: Normal pulses.      Heart sounds: Normal heart sounds.   Pulmonary:      Effort: Pulmonary effort is normal. No respiratory distress.      Breath sounds: Normal breath sounds.   Chest:      Comments: Right axillary impella site CDI   Abdominal:      General: Bowel sounds are normal.      Palpations: Abdomen is soft.      Tenderness: There is no abdominal tenderness.   Musculoskeletal:         General: Normal range of motion.      Cervical back: Normal range of motion.      Right lower leg: Edema present.      Left lower leg: Edema present.   Skin:     General: Skin is warm and dry.      Capillary Refill: Capillary refill takes 2 to 3 seconds.      Coloration: Skin is jaundiced.      Comments: Left groin ECMO cannulation site with drainage  "   Neurological:      General: No focal deficit present.      Mental Status: She is alert and oriented to person, place, and time.   Psychiatric:         Behavior: Behavior normal. Behavior is cooperative.     MEDICATION LIST: REVIEWED    acetaminophen, 975 mg, q8h  [Held by provider] aspirin, 81 mg, Daily  calcitriol, 0.5 mcg, Daily  cyclobenzaprine, 5 mg, BID  darbepoetin floyd, 100 mcg, q14 days  ferrous sulfate (325 mg ferrous sulfate), 65 mg of iron, Daily with breakfast  folic acid, 1 mg, Daily  insulin lispro, 0-10 Units, Before meals & nightly  levothyroxine, 200 mcg, Daily  lidocaine, 1 patch, Daily  melatonin, 10 mg, Nightly  multivitamin with minerals, 1 tablet, Daily  mycophenolate, 180 mg, BID  nystatin, 5 mL, q6h  pantoprazole, 40 mg, BID AC  perflutren lipid microspheres, 0.5-10 mL of dilution, Once in imaging  perflutren protein A microsphere, 0.5 mL, Once in imaging  predniSONE, 10 mg, Daily  [Held by provider] rosuvastatin, 40 mg, Nightly  sennosides-docusate sodium, 1 tablet, BID  [Held by provider] sulfamethoxazole-trimethoprim, 80 mg of trimethoprim, Daily  sulfur hexafluoride microsphr, 2 mL, Once in imaging  tacrolimus, 1 mg, q24h  tacrolimus, 1 mg, q24h  traZODone, 50 mg, Nightly  valGANciclovir, 450 mg, q48h      DOBUTamine, Last Rate: 7.5 mcg/kg/min (03/27/24 1200)  EPINEPHrine, Last Rate: 0.04 mcg/kg/min (03/27/24 1200)  PrismaSol 4/2.5, Last Rate: 25 mL/kg/hr (03/27/24 1200)  sodium bicarbonate infusion Impella Purge 25 mEq/1000 mL D5W      benzocaine-menthol, 1 lozenge, q2h PRN  dextrose, 25 g, q15 min PRN  dextrose, 25 g, q15 min PRN   Or  glucagon, 1 mg, q15 min PRN  diphenhydrAMINE, 25 mg, q5 min PRN  glucagon, 1 mg, q15 min PRN  guaiFENesin, 600 mg, BID PRN  HYDROmorphone, 0.2 mg, q4h PRN  artificial tears, 2 drop, PRN  microfibrllar collagen, , PRN  mupirocin, , BID PRN  ondansetron, 4 mg, q4h PRN  oxyCODONE, 5 mg, q6h PRN  polyethylene glycol, 17 g, Daily PRN  sodium chloride, 1  spray, 4x daily PRN        ALLERGY:  Allergies   Allergen Reactions    Dapagliflozin GI bleeding and Bleeding     UTI    Urinary tract infection    Empagliflozin Unknown    Myfortic [Mycophenolate Sodium] GI Upset    Topiramate Nausea Only and Nausea/vomiting    Latex Rash       LABS:  Results for orders placed or performed during the hospital encounter of 02/15/24 (from the past 24 hour(s))   Lactate Dehydrogenase   Result Value Ref Range    LDH 1,333 (H) 84 - 246 U/L   Comprehensive metabolic panel   Result Value Ref Range    Glucose 153 (H) 74 - 99 mg/dL    Sodium 133 (L) 136 - 145 mmol/L    Potassium 4.9 3.5 - 5.3 mmol/L    Chloride 98 98 - 107 mmol/L    Bicarbonate 23 21 - 32 mmol/L    Anion Gap 17 10 - 20 mmol/L    Urea Nitrogen 17 6 - 23 mg/dL    Creatinine 1.26 (H) 0.50 - 1.05 mg/dL    eGFR 58 (L) >60 mL/min/1.73m*2    Calcium 8.2 (L) 8.6 - 10.6 mg/dL    Albumin 3.5 3.4 - 5.0 g/dL    Alkaline Phosphatase 259 (H) 33 - 110 U/L    Total Protein 6.1 (L) 6.4 - 8.2 g/dL     (H) 9 - 39 U/L    Bilirubin, Total 3.9 (H) 0.0 - 1.2 mg/dL     (H) 7 - 45 U/L   POCT GLUCOSE   Result Value Ref Range    POCT Glucose 151 (H) 74 - 99 mg/dL   POCT GLUCOSE   Result Value Ref Range    POCT Glucose 215 (H) 74 - 99 mg/dL   Reticulocytes   Result Value Ref Range    Retic % 8.8 (H) 0.5 - 2.0 %    Retic Absolute 0.248 (H) 0.018 - 0.083 x10*6/uL    Reticulocyte Hemoglobin 34 28 - 38 pg    Immature Retic fraction 43.5 (H) <=16.0 %   Magnesium   Result Value Ref Range    Magnesium 2.62 (H) 1.60 - 2.40 mg/dL   Lactate Dehydrogenase   Result Value Ref Range    LDH 1,360 (H) 84 - 246 U/L   Lactate   Result Value Ref Range    Lactate 2.6 (H) 0.4 - 2.0 mmol/L   Fibrinogen   Result Value Ref Range    Fibrinogen 166 (L) 200 - 400 mg/dL   D-dimer, Non VTE   Result Value Ref Range    D-Dimer Non VTE, Quant (ng/mL FEU) 4,208 (H) <=500 ng/mL FEU   Coagulation Screen   Result Value Ref Range    Protime 31.2 (H) 9.8 - 12.8 seconds     INR 2.7 (H) 0.9 - 1.1    aPTT 52 (H) 27 - 38 seconds   Comprehensive metabolic panel   Result Value Ref Range    Glucose 94 74 - 99 mg/dL    Sodium 133 (L) 136 - 145 mmol/L    Potassium 4.6 3.5 - 5.3 mmol/L    Chloride 98 98 - 107 mmol/L    Bicarbonate 25 21 - 32 mmol/L    Anion Gap 15 10 - 20 mmol/L    Urea Nitrogen 15 6 - 23 mg/dL    Creatinine 1.12 (H) 0.50 - 1.05 mg/dL    eGFR 67 >60 mL/min/1.73m*2    Calcium 8.1 (L) 8.6 - 10.6 mg/dL    Albumin 3.5 3.4 - 5.0 g/dL    Alkaline Phosphatase 252 (H) 33 - 110 U/L    Total Protein 6.3 (L) 6.4 - 8.2 g/dL     (H) 9 - 39 U/L    Bilirubin, Total 3.6 (H) 0.0 - 1.2 mg/dL     (H) 7 - 45 U/L   CBC and Auto Differential   Result Value Ref Range    WBC 6.7 4.4 - 11.3 x10*3/uL    nRBC 17.1 (H) 0.0 - 0.0 /100 WBCs    RBC 2.83 (L) 4.00 - 5.20 x10*6/uL    Hemoglobin 8.5 (L) 12.0 - 16.0 g/dL    Hematocrit 25.6 (L) 36.0 - 46.0 %    MCV 91 80 - 100 fL    MCH 30.0 26.0 - 34.0 pg    MCHC 33.2 32.0 - 36.0 g/dL    RDW 25.4 (H) 11.5 - 14.5 %    Platelets 64 (L) 150 - 450 x10*3/uL    Immature Granulocytes %, Automated 1.8 (H) 0.0 - 0.9 %    Immature Granulocytes Absolute, Automated 0.12 0.00 - 0.70 x10*3/uL   Calcium, Ionized   Result Value Ref Range    POCT Calcium, Ionized 1.03 (L) 1.1 - 1.33 mmol/L   Bilirubin, Direct   Result Value Ref Range    Bilirubin, Direct 1.9 (H) 0.0 - 0.3 mg/dL   Manual Differential   Result Value Ref Range    Neutrophils %, Manual 96.5 40.0 - 80.0 %    Lymphocytes %, Manual 0.0 13.0 - 44.0 %    Monocytes %, Manual 3.5 2.0 - 10.0 %    Eosinophils %, Manual 0.0 0.0 - 6.0 %    Basophils %, Manual 0.0 0.0 - 2.0 %    Seg Neutrophils Absolute, Manual 6.47 1.20 - 7.00 x10*3/uL    Lymphocytes Absolute, Manual 0.00 (L) 1.20 - 4.80 x10*3/uL    Monocytes Absolute, Manual 0.23 0.10 - 1.00 x10*3/uL    Eosinophils Absolute, Manual 0.00 0.00 - 0.70 x10*3/uL    Basophils Absolute, Manual 0.00 0.00 - 0.10 x10*3/uL    Total Cells Counted 115     RBC Morphology See  Below     Polychromasia Mild     RBC Fragments Few     Basophilic Stippling Present    Tacrolimus level   Result Value Ref Range    Tacrolimus  14.4 <=15.0 ng/mL   Lactate   Result Value Ref Range    Lactate 2.4 (H) 0.4 - 2.0 mmol/L   Heparin Assay   Result Value Ref Range    Heparin Unfractionated 0.1 See Comment Below for Therapeutic Ranges IU/mL   POCT GLUCOSE   Result Value Ref Range    POCT Glucose 113 (H) 74 - 99 mg/dL   Blood Gas Arterial Full Panel   Result Value Ref Range    POCT pH, Arterial 7.41 7.38 - 7.42 pH    POCT pCO2, Arterial 34 (L) 38 - 42 mm Hg    POCT pO2, Arterial 110 (H) 85 - 95 mm Hg    POCT SO2, Arterial 99 94 - 100 %    POCT Oxy Hemoglobin, Arterial 95.5 94.0 - 98.0 %    POCT Hematocrit Calculated, Arterial 28.0 (L) 36.0 - 46.0 %    POCT Sodium, Arterial 131 (L) 136 - 145 mmol/L    POCT Potassium, Arterial 4.6 3.5 - 5.3 mmol/L    POCT Chloride, Arterial 99 98 - 107 mmol/L    POCT Ionized Calcium, Arterial 1.06 (L) 1.10 - 1.33 mmol/L    POCT Glucose, Arterial 150 (H) 74 - 99 mg/dL    POCT Lactate, Arterial 2.4 (H) 0.4 - 2.0 mmol/L    POCT Base Excess, Arterial -2.6 (L) -2.0 - 3.0 mmol/L    POCT HCO3 Calculated, Arterial 21.6 (L) 22.0 - 26.0 mmol/L    POCT Hemoglobin, Arterial 9.2 (L) 12.0 - 16.0 g/dL    POCT Anion Gap, Arterial 15 10 - 25 mmo/L    Patient Temperature 37.0 degrees Celsius    FiO2 21 %   Ammonia   Result Value Ref Range    Ammonia 28 16 - 53 umol/L   Lactate   Result Value Ref Range    Lactate 2.6 (H) 0.4 - 2.0 mmol/L   C-reactive protein   Result Value Ref Range    C-Reactive Protein 4.27 (H) <1.00 mg/dL   Sedimentation rate, automated   Result Value Ref Range    Sedimentation Rate <1 0 - 20 mm/h   Comprehensive metabolic panel   Result Value Ref Range    Glucose 134 (H) 74 - 99 mg/dL    Sodium 135 (L) 136 - 145 mmol/L    Potassium 4.7 3.5 - 5.3 mmol/L    Chloride 99 98 - 107 mmol/L    Bicarbonate 26 21 - 32 mmol/L    Anion Gap 15 10 - 20 mmol/L    Urea Nitrogen 14 6 - 23  mg/dL    Creatinine 1.09 (H) 0.50 - 1.05 mg/dL    eGFR 69 >60 mL/min/1.73m*2    Calcium 7.7 (L) 8.6 - 10.6 mg/dL    Albumin 3.3 (L) 3.4 - 5.0 g/dL    Alkaline Phosphatase 239 (H) 33 - 110 U/L    Total Protein 5.9 (L) 6.4 - 8.2 g/dL     (H) 9 - 39 U/L    Bilirubin, Total 4.0 (H) 0.0 - 1.2 mg/dL     (H) 7 - 45 U/L   Blood Gas Lactic Acid, Venous   Result Value Ref Range    POCT Lactate, Venous 2.8 (H) 0.4 - 2.0 mmol/L   Blood Culture    Specimen: Peripheral Venipuncture; Blood culture   Result Value Ref Range    Blood Culture Loaded on Instrument - Culture in progress    Blood Culture    Specimen: Peripheral Arterial Puncture; Blood culture   Result Value Ref Range    Blood Culture Loaded on Instrument - Culture in progress    POCT GLUCOSE   Result Value Ref Range    POCT Glucose 147 (H) 74 - 99 mg/dL   Prepare Plasma: 1 Units   Result Value Ref Range    PRODUCT CODE W9045T24     Unit Number [id number]     Unit ABO O     Unit RH POS     Dispense Status IS     Blood Expiration Date March 31, 2024 04:56 EDT     PRODUCT BLOOD TYPE 5100     UNIT VOLUME 292      *Note: Due to a large number of results and/or encounters for the requested time period, some results have not been displayed. A complete set of results can be found in Results Review.        ASSESSMENT AND PLAN:    Ms. Yimi Bowles is a 34 y.o. female who underwent a kidney transplant surgery  on 3/31/2022 (Heart).    Principal Problem:    Cardiogenic shock (CMS/HCC)  Active Problems:    Heart transplant recipient (CMS/HCC)    ESRD (end stage renal disease) on dialysis (CMS/[facility])    HIRAM (acute kidney injury) (CMS/[facility])    Acute passive congestion of liver    Hyponatremia    Iron deficiency anemia    Abdominal pain    Cardiac transplant rejection (CMS/HCC)    Acute combined systolic (congestive) and diastolic (congestive) heart failure (CMS/HCC)      CRRT Management Note:    - Patient was seen and examined while on CVVH   - Lab was reviewed  - CVVH  order was reviewed  -Discussed with primary team  -Discussed with RN   - Will continue CVVH for now  -Daily evaluation for indications for CVVH and will convert it to regular IHD once the patient is more hemodynamically stable.      * Case was discussed with primary team.  For questions, please contact transplant nephrology page x [phone]    Russ Bergeron    Transplant Nephrologist

## 2024-03-28 NOTE — PROGRESS NOTES
"        INPATIENT TRANSPLANT NEPHROLOGY PROGRESS NOTE          REASON FOR CONSULT:  Immunosuppressive medication management and nephrology related issues.    SUBJECTION:  [facility] Procedure note    ECMO  On pressors  Tolerated [facility] well  Discussed with heart failure team   No acute event overnight.    PHYCISCAL EXAMINATION:    Visit Vitals  BP (!) 112/105   Pulse 88   Temp 36.5 °C (97.7 °F)   Resp (!) 34   Ht 1.549 m (5' 0.98\")   Wt 96 kg (211 lb 10.3 oz)   SpO2 (!) 83%   BMI 40.01 kg/m²   OB Status Hysterectomy   Smoking Status Never   BSA 2.03 m²        03/26 1900 - 03/28 0659  In: 3439.9 [I.V.:2139.9]  Out: 1598 [Urine:30]     Weight change:     Constitutional:       General: She is not in acute distress.     Appearance: Normal appearance. She is ill-appearing and toxic-appearing.   HENT:      Head: Normocephalic.      Mouth/Throat:      Mouth: Mucous membranes are moist.   Eyes:      Extraocular Movements: Extraocular movements intact.      Pupils: Pupils are equal, round, and reactive to light.   Neck:      Comments: RIJ dialysis line present - CDI  Cardiovascular:      Rate and Rhythm: Regular rhythm. Tachycardia present.      Pulses: Normal pulses.      Heart sounds: Normal heart sounds.   Pulmonary:      Effort: Pulmonary effort is normal. No respiratory distress.      Breath sounds: Normal breath sounds.   Chest:      Comments: Right axillary impella site CDI   Abdominal:      General: Bowel sounds are normal.      Palpations: Abdomen is soft.      Tenderness: There is no abdominal tenderness.   Musculoskeletal:         General: Normal range of motion.      Cervical back: Normal range of motion.      Right lower leg: Edema present.      Left lower leg: Edema present.   Skin:     General: Skin is warm and dry.      Capillary Refill: Capillary refill takes 2 to 3 seconds.      Coloration: Skin is jaundiced.      Comments: Left groin ECMO cannulation site with drainage    Neurological:      General: No focal " deficit present.      Mental Status: She is alert and oriented to person, place, and time.   Psychiatric:         Behavior: Behavior normal. Behavior is cooperative.     MEDICATION LIST: REVIEWED    [Held by provider] acetaminophen, 650 mg, q4h  [Held by provider] aspirin, 81 mg, Daily  calcitriol, 0.5 mcg, Daily  cephalexin, 500 mg, q12h TRICIA  [Held by provider] cyclobenzaprine, 5 mg, BID  darbepoetin floyd, 100 mcg, q14 days  ferrous sulfate (325 mg ferrous sulfate), 65 mg of iron, Daily with breakfast  folic acid, 1 mg, Daily  heparin (porcine), ,   heparin, ,   insulin lispro, 0-15 Units, q4h  levothyroxine, 200 mcg, Daily  lidocaine, 1 patch, Daily  multivitamin with minerals, 1 tablet, Daily  mycophenolate, 180 mg, BID  nystatin, 5 mL, q6h  oxygen, , Continuous - Inhalation  pantoprazole, 40 mg, BID  perflutren protein A microsphere, 0.5 mL, Once in imaging  predniSONE, 10 mg, Daily  [Held by provider] rosuvastatin, 40 mg, Nightly  sennosides-docusate sodium, 2 tablet, BID  [Held by provider] sulfamethoxazole-trimethoprim, 80 mg of trimethoprim, Daily  sulfur hexafluoride microsphr, 2 mL, Once in imaging  tacrolimus, 0.5 mg, q12h TRICIA  [Held by provider] traZODone, 25 mg, Nightly  valGANciclovir, 450 mg, q48h      dexmedeTOMIDine, Last Rate: 0.5 mcg/kg/hr (03/28/24 1300)  DOBUTamine, Last Rate: Stopped (03/28/24 1100)  heparin Impella Purge 25 units/mL in 500 mL D5W, Last Rate: 10 mL/hr (03/28/24 1300)  lactated Ringer's, Last Rate: 20 mL/hr (03/28/24 1300)  lactated Ringer's, Last Rate: 5 mL/hr (03/28/24 1300)  PrismaSol 4/2.5  propofol, Last Rate: Stopped (03/28/24 1140)      calcium gluconate, 1 g, q6h PRN  calcium gluconate, 2 g, q6h PRN  dextrose, 25 g, q15 min PRN  dextrose, 25 g, q15 min PRN   Or  glucagon, 1 mg, q15 min PRN  dextrose, 25 g, q15 min PRN   Or  glucagon, 1 mg, q15 min PRN  diphenhydrAMINE, 25 mg, q5 min PRN  glucagon, 1 mg, q15 min PRN  heparin (porcine), ,   heparin, ,   HYDROmorphone, 0.2  mg, q15 min PRN  artificial tears, 2 drop, PRN  magnesium sulfate, 2 g, q6h PRN  magnesium sulfate, 4 g, q6h PRN  microfibrllar collagen, , PRN  mupirocin, , BID PRN  naloxone, 0.2 mg, q5 min PRN  ondansetron, 4 mg, q4h PRN  oxyCODONE, 5 mg, q4h PRN  polyethylene glycol, 17 g, Daily PRN  sodium chloride, 1 spray, 4x daily PRN        ALLERGY:  Allergies   Allergen Reactions    Dapagliflozin GI bleeding and Bleeding     UTI    Urinary tract infection    Empagliflozin Unknown    Myfortic [Mycophenolate Sodium] GI Upset    Topiramate Nausea Only and Nausea/vomiting    Latex Rash       LABS:  Results for orders placed or performed during the hospital encounter of 02/15/24 (from the past 24 hour(s))   Coagulation Screen   Result Value Ref Range    Protime 28.4 (H) 9.8 - 12.8 seconds    INR 2.5 (H) 0.9 - 1.1    aPTT 45 (H) 27 - 38 seconds   Blood Gas Lactic Acid, Venous   Result Value Ref Range    POCT Lactate, Venous 5.0 (HH) 0.4 - 2.0 mmol/L   Blood Gas Arterial   Result Value Ref Range    POCT pH, Arterial 7.36 (L) 7.38 - 7.42 pH    POCT pCO2, Arterial 30 (L) 38 - 42 mm Hg    POCT pO2, Arterial 78 (L) 85 - 95 mm Hg    POCT SO2, Arterial 97 94 - 100 %    POCT Oxy Hemoglobin, Arterial 92.7 (L) 94.0 - 98.0 %    POCT Base Excess, Arterial -7.6 (L) -2.0 - 3.0 mmol/L    POCT HCO3 Calculated, Arterial 16.9 (L) 22.0 - 26.0 mmol/L    Patient Temperature 37.0 degrees Celsius    FiO2 21 %   Blood Gas Arterial Full Panel   Result Value Ref Range    POCT pH, Arterial 7.36 (L) 7.38 - 7.42 pH    POCT pCO2, Arterial 30 (L) 38 - 42 mm Hg    POCT pO2, Arterial 78 (L) 85 - 95 mm Hg    POCT SO2, Arterial 97 94 - 100 %    POCT Oxy Hemoglobin, Arterial 92.7 (L) 94.0 - 98.0 %    POCT Hematocrit Calculated, Arterial 26.0 (L) 36.0 - 46.0 %    POCT Sodium, Arterial 130 (L) 136 - 145 mmol/L    POCT Potassium, Arterial 5.3 3.5 - 5.3 mmol/L    POCT Chloride, Arterial 100 98 - 107 mmol/L    POCT Ionized Calcium, Arterial 1.01 (L) 1.10 - 1.33 mmol/L     POCT Glucose, Arterial 213 (H) 74 - 99 mg/dL    POCT Lactate, Arterial 6.9 (HH) 0.4 - 2.0 mmol/L    POCT Base Excess, Arterial -7.6 (L) -2.0 - 3.0 mmol/L    POCT HCO3 Calculated, Arterial 16.9 (L) 22.0 - 26.0 mmol/L    POCT Hemoglobin, Arterial 8.8 (L) 12.0 - 16.0 g/dL    POCT Anion Gap, Arterial 18 10 - 25 mmo/L    Patient Temperature 37.0 degrees Celsius    FiO2 21 %   Blood Gas Arterial Full Panel Unsolicited   Result Value Ref Range    POCT pH, Arterial 7.32 (L) 7.38 - 7.42 pH    POCT pCO2, Arterial 33 (L) 38 - 42 mm Hg    POCT pO2, Arterial 240 (H) 85 - 95 mm Hg    POCT SO2, Arterial 100 94 - 100 %    POCT Oxy Hemoglobin, Arterial 97.2 94.0 - 98.0 %    POCT Hematocrit Calculated, Arterial 24.0 (L) 36.0 - 46.0 %    POCT Sodium, Arterial 128 (L) 136 - 145 mmol/L    POCT Potassium, Arterial 5.7 (H) 3.5 - 5.3 mmol/L    POCT Chloride, Arterial 99 98 - 107 mmol/L    POCT Ionized Calcium, Arterial 0.95 (L) 1.10 - 1.33 mmol/L    POCT Glucose, Arterial 181 (H) 74 - 99 mg/dL    POCT Lactate, Arterial 7.0 (HH) 0.4 - 2.0 mmol/L    POCT Base Excess, Arterial -8.3 (L) -2.0 - 3.0 mmol/L    POCT HCO3 Calculated, Arterial 17.0 (L) 22.0 - 26.0 mmol/L    POCT Hemoglobin, Arterial 8.0 (L) 12.0 - 16.0 g/dL    POCT Anion Gap, Arterial 18 10 - 25 mmo/L    Patient Temperature 37.0 degrees Celsius    FiO2 100 %   Prepare RBC: 4 Units, Leukocytes Reduced (CMV reduced risk)   Result Value Ref Range    PRODUCT CODE B7472N37     Unit Number [id number]     Unit ABO O     Unit RH POS     XM INTEP COMP     Dispense Status XM     Blood Expiration Date April 18, 2024 23:59 EDT     PRODUCT BLOOD TYPE 5100     UNIT VOLUME 350     PRODUCT CODE [id number]     Unit Number [id number]     Unit ABO O     Unit RH POS     XM INTEP COMP     Dispense Status XM     Blood Expiration Date April 18, 2024 23:59 EDT     PRODUCT BLOOD TYPE 5100     UNIT VOLUME 350     PRODUCT CODE [id number]     Unit Number [id number]     Unit ABO O     Unit RH POS      XM INTEP COMP     Dispense Status TR     Blood Expiration Date April 18, 2024 23:59 EDT     PRODUCT BLOOD TYPE 5100     UNIT VOLUME 350     PRODUCT CODE [id number]     Unit Number [id number]     Unit ABO O     Unit RH POS     XM INTEP COMP     Dispense Status TR     Blood Expiration Date April 18, 2024 23:59 EDT     PRODUCT BLOOD TYPE 5100     UNIT VOLUME 350    Blood Gas Venous Full Panel Unsolicited   Result Value Ref Range    POCT pH, Venous 7.28 (L) 7.33 - 7.43 pH    POCT pCO2, Venous 38 (L) 41 - 51 mm Hg    POCT pO2, Venous 49 (H) 35 - 45 mm Hg    POCT SO2, Venous 76 (H) 45 - 75 %    POCT Oxy Hemoglobin, Venous 73.9 45.0 - 75.0 %    POCT Hematocrit Calculated, Venous 17.0 (L) 36.0 - 46.0 %    POCT Sodium, Venous 131 (L) 136 - 145 mmol/L    POCT Potassium, Venous 4.4 3.5 - 5.3 mmol/L    POCT Chloride, Venous 103 98 - 107 mmol/L    POCT Ionized Calicum, Venous 1.04 (L) 1.10 - 1.33 mmol/L    POCT Glucose, Venous 206 (H) 74 - 99 mg/dL    POCT Lactate, Venous 4.6 (HH) 0.4 - 2.0 mmol/L    POCT Base Excess, Venous -8.1 (L) -2.0 - 3.0 mmol/L    POCT HCO3 Calculated, Venous 17.9 (L) 22.0 - 26.0 mmol/L    POCT Hemoglobin, Venous 5.7 (LL) 12.0 - 16.0 g/dL    POCT Anion Gap, Venous 15.0 10.0 - 25.0 mmol/L    Patient Temperature 37.0 degrees Celsius    FiO2 100 %   Blood Gas Arterial Full Panel Unsolicited   Result Value Ref Range    POCT pH, Arterial 7.35 (L) 7.38 - 7.42 pH    POCT pCO2, Arterial 31 (L) 38 - 42 mm Hg    POCT pO2, Arterial 368 (H) 85 - 95 mm Hg    POCT SO2, Arterial 100 94 - 100 %    POCT Oxy Hemoglobin, Arterial 97.6 94.0 - 98.0 %    POCT Hematocrit Calculated, Arterial 17.0 (L) 36.0 - 46.0 %    POCT Sodium, Arterial 131 (L) 136 - 145 mmol/L    POCT Potassium, Arterial 4.4 3.5 - 5.3 mmol/L    POCT Chloride, Arterial 104 98 - 107 mmol/L    POCT Ionized Calcium, Arterial 1.05 (L) 1.10 - 1.33 mmol/L    POCT Glucose, Arterial 212 (H) 74 - 99 mg/dL    POCT Lactate, Arterial 4.4 (HH) 0.4 - 2.0 mmol/L     POCT Base Excess, Arterial -7.8 (L) -2.0 - 3.0 mmol/L    POCT HCO3 Calculated, Arterial 17.1 (L) 22.0 - 26.0 mmol/L    POCT Hemoglobin, Arterial 5.8 (LL) 12.0 - 16.0 g/dL    POCT Anion Gap, Arterial 14 10 - 25 mmo/L    Patient Temperature 37.0 degrees Celsius    FiO2 100 %   Blood Gas Arterial Full Panel Unsolicited   Result Value Ref Range    POCT pH, Arterial 7.39 7.38 - 7.42 pH    POCT pCO2, Arterial 29 (L) 38 - 42 mm Hg    POCT pO2, Arterial 413 (H) 85 - 95 mm Hg    POCT SO2, Arterial 100 94 - 100 %    POCT Oxy Hemoglobin, Arterial 97.2 94.0 - 98.0 %    POCT Hematocrit Calculated, Arterial 18.0 (L) 36.0 - 46.0 %    POCT Sodium, Arterial 128 (L) 136 - 145 mmol/L    POCT Potassium, Arterial 4.6 3.5 - 5.3 mmol/L    POCT Chloride, Arterial 101 98 - 107 mmol/L    POCT Ionized Calcium, Arterial 1.10 1.10 - 1.33 mmol/L    POCT Glucose, Arterial 232 (H) 74 - 99 mg/dL    POCT Lactate, Arterial 4.6 (HH) 0.4 - 2.0 mmol/L    POCT Base Excess, Arterial -6.7 (L) -2.0 - 3.0 mmol/L    POCT HCO3 Calculated, Arterial 17.6 (L) 22.0 - 26.0 mmol/L    POCT Hemoglobin, Arterial 6.0 (LL) 12.0 - 16.0 g/dL    POCT Anion Gap, Arterial 14 10 - 25 mmo/L    Patient Temperature 37.0 degrees Celsius    FiO2 100 %   Blood Gas Venous Full Panel Unsolicited   Result Value Ref Range    POCT pH, Venous 7.31 (L) 7.33 - 7.43 pH    POCT pCO2, Venous 42 41 - 51 mm Hg    POCT pO2, Venous 63 (H) 35 - 45 mm Hg    POCT SO2, Venous 91 (H) 45 - 75 %    POCT Oxy Hemoglobin, Venous 88.3 (H) 45.0 - 75.0 %    POCT Hematocrit Calculated, Venous 23.0 (L) 36.0 - 46.0 %    POCT Sodium, Venous 129 (L) 136 - 145 mmol/L    POCT Potassium, Venous 4.8 3.5 - 5.3 mmol/L    POCT Chloride, Venous 99 98 - 107 mmol/L    POCT Ionized Calicum, Venous 1.09 (L) 1.10 - 1.33 mmol/L    POCT Glucose, Venous 246 (H) 74 - 99 mg/dL    POCT Lactate, Venous 3.1 (H) 0.4 - 2.0 mmol/L    POCT Base Excess, Venous -4.8 (L) -2.0 - 3.0 mmol/L    POCT HCO3 Calculated, Venous 21.1 (L) 22.0 - 26.0  mmol/L    POCT Hemoglobin, Venous 7.5 (L) 12.0 - 16.0 g/dL    POCT Anion Gap, Venous 14.0 10.0 - 25.0 mmol/L    Patient Temperature 37.0 degrees Celsius    FiO2 100 %   Coox Panel, Venous Unsolicited   Result Value Ref Range    POCT Carboxyhemoglobin, Venous 2.5 %    POCT Methemoglobin, Venous 0.8 0.0 - 1.5 %   Blood Gas Arterial Full Panel   Result Value Ref Range    POCT pH, Arterial 7.42 7.38 - 7.42 pH    POCT pCO2, Arterial 31 (L) 38 - 42 mm Hg    POCT pO2, Arterial 316 (H) 85 - 95 mm Hg    POCT SO2, Arterial 100 94 - 100 %    POCT Oxy Hemoglobin, Arterial 96.5 94.0 - 98.0 %    POCT Hematocrit Calculated, Arterial 24.0 (L) 36.0 - 46.0 %    POCT Sodium, Arterial 129 (L) 136 - 145 mmol/L    POCT Potassium, Arterial 4.7 3.5 - 5.3 mmol/L    POCT Chloride, Arterial 100 98 - 107 mmol/L    POCT Ionized Calcium, Arterial 1.09 (L) 1.10 - 1.33 mmol/L    POCT Glucose, Arterial 254 (H) 74 - 99 mg/dL    POCT Lactate, Arterial 2.5 (H) 0.4 - 2.0 mmol/L    POCT Base Excess, Arterial -3.8 (L) -2.0 - 3.0 mmol/L    POCT HCO3 Calculated, Arterial 20.1 (L) 22.0 - 26.0 mmol/L    POCT Hemoglobin, Arterial 7.9 (L) 12.0 - 16.0 g/dL    POCT Anion Gap, Arterial 14 10 - 25 mmo/L    Patient Temperature 37.0 degrees Celsius    FiO2 50 %   Reticulocytes   Result Value Ref Range    Retic % 7.2 (H) 0.5 - 2.0 %    Retic Absolute 0.177 (H) 0.018 - 0.083 x10*6/uL    Reticulocyte Hemoglobin 36 28 - 38 pg    Immature Retic fraction 35.1 (H) <=16.0 %   Magnesium   Result Value Ref Range    Magnesium 2.39 1.60 - 2.40 mg/dL   Lactate Dehydrogenase   Result Value Ref Range    LDH 1,422 (H) 84 - 246 U/L   Fibrinogen   Result Value Ref Range    Fibrinogen 152 (L) 200 - 400 mg/dL   D-dimer, Non VTE   Result Value Ref Range    D-Dimer Non VTE, Quant (ng/mL FEU) 3,987 (H) <=500 ng/mL FEU   Coagulation Screen   Result Value Ref Range    Protime 37.8 (H) 9.8 - 12.8 seconds    INR 3.3 (H) 0.9 - 1.1    aPTT >200 (HH) 27 - 38 seconds   Comprehensive metabolic  panel   Result Value Ref Range    Glucose 230 (H) 74 - 99 mg/dL    Sodium 133 (L) 136 - 145 mmol/L    Potassium 4.6 3.5 - 5.3 mmol/L    Chloride 100 98 - 107 mmol/L    Bicarbonate 19 (L) 21 - 32 mmol/L    Anion Gap 19 10 - 20 mmol/L    Urea Nitrogen 18 6 - 23 mg/dL    Creatinine 1.17 (H) 0.50 - 1.05 mg/dL    eGFR 63 >60 mL/min/1.73m*2    Calcium 7.7 (L) 8.6 - 10.6 mg/dL    Albumin 2.8 (L) 3.4 - 5.0 g/dL    Alkaline Phosphatase 184 (H) 33 - 110 U/L    Total Protein 5.1 (L) 6.4 - 8.2 g/dL     (H) 9 - 39 U/L    Bilirubin, Total 3.6 (H) 0.0 - 1.2 mg/dL     (H) 7 - 45 U/L   CBC and Auto Differential   Result Value Ref Range    WBC 6.9 4.4 - 11.3 x10*3/uL    nRBC 22.3 (H) 0.0 - 0.0 /100 WBCs    RBC 2.45 (L) 4.00 - 5.20 x10*6/uL    Hemoglobin 7.5 (L) 12.0 - 16.0 g/dL    Hematocrit 21.6 (L) 36.0 - 46.0 %    MCV 88 80 - 100 fL    MCH 30.6 26.0 - 34.0 pg    MCHC 34.7 32.0 - 36.0 g/dL    RDW 22.4 (H) 11.5 - 14.5 %    Platelets 44 (L) 150 - 450 x10*3/uL    Neutrophils % 89.7 40.0 - 80.0 %    Immature Granulocytes %, Automated 3.5 (H) 0.0 - 0.9 %    Lymphocytes % 2.5 13.0 - 44.0 %    Monocytes % 4.1 2.0 - 10.0 %    Eosinophils % 0.1 0.0 - 6.0 %    Basophils % 0.1 0.0 - 2.0 %    Neutrophils Absolute 6.16 1.20 - 7.70 x10*3/uL    Immature Granulocytes Absolute, Automated 0.24 0.00 - 0.70 x10*3/uL    Lymphocytes Absolute 0.17 (L) 1.20 - 4.80 x10*3/uL    Monocytes Absolute 0.28 0.10 - 1.00 x10*3/uL    Eosinophils Absolute 0.01 0.00 - 0.70 x10*3/uL    Basophils Absolute 0.01 0.00 - 0.10 x10*3/uL   Calcium, Ionized   Result Value Ref Range    POCT Calcium, Ionized 1.02 (L) 1.1 - 1.33 mmol/L   Bilirubin, Direct   Result Value Ref Range    Bilirubin, Direct 1.8 (H) 0.0 - 0.3 mg/dL   Phosphorus   Result Value Ref Range    Phosphorus 3.2 2.5 - 4.9 mg/dL   Morphology   Result Value Ref Range    RBC Morphology See Below     Polychromasia Mild     RBC Fragments Few     Leland Cells Few     Acanthocytes Few    Blood Gas Arterial  Full Panel   Result Value Ref Range    POCT pH, Arterial 7.46 (H) 7.38 - 7.42 pH    POCT pCO2, Arterial 32 (L) 38 - 42 mm Hg    POCT pO2, Arterial 422 (H) 85 - 95 mm Hg    POCT SO2, Arterial 100 94 - 100 %    POCT Oxy Hemoglobin, Arterial 96.0 94.0 - 98.0 %    POCT Hematocrit Calculated, Arterial 23.0 (L) 36.0 - 46.0 %    POCT Sodium, Arterial 129 (L) 136 - 145 mmol/L    POCT Potassium, Arterial 4.7 3.5 - 5.3 mmol/L    POCT Chloride, Arterial 101 98 - 107 mmol/L    POCT Ionized Calcium, Arterial 1.19 1.10 - 1.33 mmol/L    POCT Glucose, Arterial 173 (H) 74 - 99 mg/dL    POCT Lactate, Arterial 1.7 0.4 - 2.0 mmol/L    POCT Base Excess, Arterial -0.8 -2.0 - 3.0 mmol/L    POCT HCO3 Calculated, Arterial 22.8 22.0 - 26.0 mmol/L    POCT Hemoglobin, Arterial 7.8 (L) 12.0 - 16.0 g/dL    POCT Anion Gap, Arterial 10 10 - 25 mmo/L    Patient Temperature 37.0 degrees Celsius    FiO2 50 %   ECMO Arterial Blood Gas   Result Value Ref Range    POCT pH, Arterial ECMO 7.39 Reference range not established pH    POCT pCO2, Arterial ECMO 38 Reference range not established mm Hg    POCT pO2,  Arterial ECMO 503 Reference range not established mm Hg    POCT SO2, Arterial ECMO 100 Reference range not established %    POCT Oxy Hemoglobin, Arterial ECMO 95.4 Reference range not established %    POCT Base Excess, Arterial ECMO -1.8 Reference range not established mmol/L    POCT HCO3 Calculated, Arterial ECMO 23.0 Reference range not established mmol/L    Patient Temperature 37.0 degrees Celsius    FiO2 100 %   ECMO Venous Blood Gas   Result Value Ref Range    POCT pH, Venous ECMO 7.36 Reference range not established pH    POCT pCO2, Venous ECMO 41 Reference range not established mm Hg    POCT pO2,  Venous  ECMO 49 Reference range not established mm Hg    POCT SO2, Venous ECMO 83 Reference range not established %    POCT Oxy Hemoglobin, Venous ECMO 79.8 Reference range not established %    POCT Base Excess, Venous ECMO -2.1 Reference range not  established mmol/L    POCT HCO3 Calculated, Venous ECMO 23.2 Reference range not established mmol/L    Patient Temperature 37.0 degrees Celsius    FiO2 100 %   POCT GLUCOSE   Result Value Ref Range    POCT Glucose 122 (H) 74 - 99 mg/dL   Calcium, Ionized   Result Value Ref Range    POCT Calcium, Ionized 1.11 1.1 - 1.33 mmol/L   Magnesium   Result Value Ref Range    Magnesium 2.40 1.60 - 2.40 mg/dL   Coagulation Screen   Result Value Ref Range    Protime 27.2 (H) 9.8 - 12.8 seconds    INR 2.4 (H) 0.9 - 1.1    aPTT 43 (H) 27 - 38 seconds   Fibrinogen   Result Value Ref Range    Fibrinogen 171 (L) 200 - 400 mg/dL   CBC   Result Value Ref Range    WBC 8.6 4.4 - 11.3 x10*3/uL    nRBC 24.0 (H) 0.0 - 0.0 /100 WBCs    RBC 2.38 (L) 4.00 - 5.20 x10*6/uL    Hemoglobin 7.5 (L) 12.0 - 16.0 g/dL    Hematocrit 20.9 (L) 36.0 - 46.0 %    MCV 88 80 - 100 fL    MCH 31.5 26.0 - 34.0 pg    MCHC 35.9 32.0 - 36.0 g/dL    RDW 23.0 (H) 11.5 - 14.5 %    Platelets 131 (L) 150 - 450 x10*3/uL   Renal Function Panel   Result Value Ref Range    Glucose 120 (H) 74 - 99 mg/dL    Sodium 132 (L) 136 - 145 mmol/L    Potassium 4.5 3.5 - 5.3 mmol/L    Chloride 100 98 - 107 mmol/L    Bicarbonate 26 21 - 32 mmol/L    Anion Gap 11 10 - 20 mmol/L    Urea Nitrogen 20 6 - 23 mg/dL    Creatinine 1.38 (H) 0.50 - 1.05 mg/dL    eGFR 52 (L) >60 mL/min/1.73m*2    Calcium 8.2 (L) 8.6 - 10.6 mg/dL    Phosphorus 2.5 2.5 - 4.9 mg/dL    Albumin 2.8 (L) 3.4 - 5.0 g/dL   Lactate Dehydrogenase   Result Value Ref Range    LDH 1,539 (H) 84 - 246 U/L   Hepatic function panel   Result Value Ref Range    Albumin 2.8 (L) 3.4 - 5.0 g/dL    Bilirubin, Total 3.7 (H) 0.0 - 1.2 mg/dL    Bilirubin, Direct 1.3 (H) 0.0 - 0.3 mg/dL    Alkaline Phosphatase 165 (H) 33 - 110 U/L     (H) 7 - 45 U/L     (H) 9 - 39 U/L    Total Protein 4.8 (L) 6.4 - 8.2 g/dL   Creatine Kinase   Result Value Ref Range    Creatine Kinase 75 0 - 215 U/L   ECMO Arterial Blood Gas   Result Value Ref  Range    POCT pH, Arterial ECMO 7.37 Reference range not established pH    POCT pCO2, Arterial ECMO 42 Reference range not established mm Hg    POCT pO2,  Arterial ECMO 528 Reference range not established mm Hg    POCT SO2, Arterial ECMO 100 Reference range not established %    POCT Oxy Hemoglobin, Arterial ECMO 96.5 Reference range not established %    POCT Base Excess, Arterial ECMO -1.1 Reference range not established mmol/L    POCT HCO3 Calculated, Arterial ECMO 24.3 Reference range not established mmol/L    Patient Temperature 37.0 degrees Celsius    FiO2 100 %   ECMO Venous Blood Gas   Result Value Ref Range    POCT pH, Venous ECMO 7.34 Reference range not established pH    POCT pCO2, Venous ECMO 45 Reference range not established mm Hg    POCT pO2,  Venous  ECMO 51 Reference range not established mm Hg    POCT SO2, Venous ECMO 85 Reference range not established %    POCT Oxy Hemoglobin, Venous ECMO 79.8 Reference range not established %    POCT Base Excess, Venous ECMO -1.7 Reference range not established mmol/L    POCT HCO3 Calculated, Venous ECMO 24.3 Reference range not established mmol/L    Patient Temperature 37.0 degrees Celsius    FiO2 100 %   Blood Gas Arterial Full Panel   Result Value Ref Range    POCT pH, Arterial 7.32 (L) 7.38 - 7.42 pH    POCT pCO2, Arterial 44 (H) 38 - 42 mm Hg    POCT pO2, Arterial 454 (H) 85 - 95 mm Hg    POCT SO2, Arterial 100 94 - 100 %    POCT Oxy Hemoglobin, Arterial 96.3 94.0 - 98.0 %    POCT Hematocrit Calculated, Arterial 25.0 (L) 36.0 - 46.0 %    POCT Sodium, Arterial 130 (L) 136 - 145 mmol/L    POCT Potassium, Arterial 4.6 3.5 - 5.3 mmol/L    POCT Chloride, Arterial 101 98 - 107 mmol/L    POCT Ionized Calcium, Arterial 1.13 1.10 - 1.33 mmol/L    POCT Glucose, Arterial 106 (H) 74 - 99 mg/dL    POCT Lactate, Arterial 0.9 0.4 - 2.0 mmol/L    POCT Base Excess, Arterial -3.2 (L) -2.0 - 3.0 mmol/L    POCT HCO3 Calculated, Arterial 22.7 22.0 - 26.0 mmol/L    POCT Hemoglobin,  Arterial 8.3 (L) 12.0 - 16.0 g/dL    POCT Anion Gap, Arterial 11 10 - 25 mmo/L    Patient Temperature 37.0 degrees Celsius    FiO2 100 %   Tacrolimus level   Result Value Ref Range    Tacrolimus  10.0 <=15.0 ng/mL   Heparin Assay   Result Value Ref Range    Heparin Unfractionated <0.1 See Comment Below for Therapeutic Ranges IU/mL   Blood Gas Arterial Full Panel   Result Value Ref Range    POCT pH, Arterial 7.41 7.38 - 7.42 pH    POCT pCO2, Arterial 37 (L) 38 - 42 mm Hg    POCT pO2, Arterial 423 (H) 85 - 95 mm Hg    POCT SO2, Arterial 100 94 - 100 %    POCT Oxy Hemoglobin, Arterial 95.6 94.0 - 98.0 %    POCT Hematocrit Calculated, Arterial 27.0 (L) 36.0 - 46.0 %    POCT Sodium, Arterial 128 (L) 136 - 145 mmol/L    POCT Potassium, Arterial 4.7 3.5 - 5.3 mmol/L    POCT Chloride, Arterial 101 98 - 107 mmol/L    POCT Ionized Calcium, Arterial 1.15 1.10 - 1.33 mmol/L    POCT Glucose, Arterial 131 (H) 74 - 99 mg/dL    POCT Lactate, Arterial 0.9 0.4 - 2.0 mmol/L    POCT Base Excess, Arterial -1.0 -2.0 - 3.0 mmol/L    POCT HCO3 Calculated, Arterial 23.5 22.0 - 26.0 mmol/L    POCT Hemoglobin, Arterial 9.0 (L) 12.0 - 16.0 g/dL    POCT Anion Gap, Arterial 8 (L) 10 - 25 mmo/L    Patient Temperature 37.0 degrees Celsius    FiO2 50 %   ACTIVATED CLOTTING TIME LOW   Result Value Ref Range    POCT Activated Clotting Time Low Range 171 83 - 199 sec   Calcium, Ionized   Result Value Ref Range    POCT Calcium, Ionized 1.08 (L) 1.1 - 1.33 mmol/L   Magnesium   Result Value Ref Range    Magnesium 2.42 (H) 1.60 - 2.40 mg/dL   Coagulation Screen   Result Value Ref Range    Protime 20.6 (H) 9.8 - 12.8 seconds    INR 1.8 (H) 0.9 - 1.1    aPTT 41 (H) 27 - 38 seconds   Fibrinogen   Result Value Ref Range    Fibrinogen 154 (L) 200 - 400 mg/dL   CBC   Result Value Ref Range    WBC 8.7 4.4 - 11.3 x10*3/uL    nRBC 17.6 (H) 0.0 - 0.0 /100 WBCs    RBC 2.81 (L) 4.00 - 5.20 x10*6/uL    Hemoglobin 8.5 (L) 12.0 - 16.0 g/dL    Hematocrit 24.5 (L) 36.0 -  46.0 %    MCV 87 80 - 100 fL    MCH 30.2 26.0 - 34.0 pg    MCHC 34.7 32.0 - 36.0 g/dL    RDW 21.0 (H) 11.5 - 14.5 %    Platelets 111 (L) 150 - 450 x10*3/uL   Renal Function Panel   Result Value Ref Range    Glucose 130 (H) 74 - 99 mg/dL    Sodium 132 (L) 136 - 145 mmol/L    Potassium 4.5 3.5 - 5.3 mmol/L    Chloride 101 98 - 107 mmol/L    Bicarbonate 25 21 - 32 mmol/L    Anion Gap 11 10 - 20 mmol/L    Urea Nitrogen 21 6 - 23 mg/dL    Creatinine 1.65 (H) 0.50 - 1.05 mg/dL    eGFR 42 (L) >60 mL/min/1.73m*2    Calcium 7.5 (L) 8.6 - 10.6 mg/dL    Phosphorus 2.4 (L) 2.5 - 4.9 mg/dL    Albumin 2.7 (L) 3.4 - 5.0 g/dL   Creatine Kinase   Result Value Ref Range    Creatine Kinase 71 0 - 215 U/L   Blood Gas Arterial Full Panel   Result Value Ref Range    POCT pH, Arterial 7.38 7.38 - 7.42 pH    POCT pCO2, Arterial 39 38 - 42 mm Hg    POCT pO2, Arterial 446 (H) 85 - 95 mm Hg    POCT SO2, Arterial 100 94 - 100 %    POCT Oxy Hemoglobin, Arterial 95.8 94.0 - 98.0 %    POCT Hematocrit Calculated, Arterial 26.0 (L) 36.0 - 46.0 %    POCT Sodium, Arterial 128 (L) 136 - 145 mmol/L    POCT Potassium, Arterial 4.7 3.5 - 5.3 mmol/L    POCT Chloride, Arterial 100 98 - 107 mmol/L    POCT Ionized Calcium, Arterial 1.13 1.10 - 1.33 mmol/L    POCT Glucose, Arterial 138 (H) 74 - 99 mg/dL    POCT Lactate, Arterial 0.7 0.4 - 2.0 mmol/L    POCT Base Excess, Arterial -1.8 -2.0 - 3.0 mmol/L    POCT HCO3 Calculated, Arterial 23.1 22.0 - 26.0 mmol/L    POCT Hemoglobin, Arterial 8.8 (L) 12.0 - 16.0 g/dL    POCT Anion Gap, Arterial 10 10 - 25 mmo/L    Patient Temperature 37.0 degrees Celsius    FiO2 50 %   Blood Gas Arterial Full Panel   Result Value Ref Range    POCT pH, Arterial 7.38 7.38 - 7.42 pH    POCT pCO2, Arterial 37 (L) 38 - 42 mm Hg    POCT pO2, Arterial 408 (H) 85 - 95 mm Hg    POCT SO2, Arterial 100 94 - 100 %    POCT Oxy Hemoglobin, Arterial 95.9 94.0 - 98.0 %    POCT Hematocrit Calculated, Arterial 26.0 (L) 36.0 - 46.0 %    POCT Sodium,  Arterial 129 (L) 136 - 145 mmol/L    POCT Potassium, Arterial 4.2 3.5 - 5.3 mmol/L    POCT Chloride, Arterial 103 98 - 107 mmol/L    POCT Ionized Calcium, Arterial      POCT Glucose, Arterial 123 (H) 74 - 99 mg/dL    POCT Lactate, Arterial 0.6 0.4 - 2.0 mmol/L    POCT Base Excess, Arterial -2.9 (L) -2.0 - 3.0 mmol/L    POCT HCO3 Calculated, Arterial 21.9 (L) 22.0 - 26.0 mmol/L    POCT Hemoglobin, Arterial 8.5 (L) 12.0 - 16.0 g/dL    POCT Anion Gap, Arterial 8 (L) 10 - 25 mmo/L    Patient Temperature 37.0 degrees Celsius    FiO2 40 %     *Note: Due to a large number of results and/or encounters for the requested time period, some results have not been displayed. A complete set of results can be found in Results Review.        ASSESSMENT AND PLAN:    Ms. Yimi Bowles is a 34 y.o. female who underwent a kidney transplant surgery  on 3/31/2022 (Heart).    Principal Problem:    Cardiogenic shock (CMS/[facility])  Active Problems:    Heart transplant recipient (CMS/[facility])    ESRD (end stage renal disease) on dialysis (CMS/[facility])    HIRAM (acute kidney injury) (CMS/[facility])    Acute passive congestion of liver    Hyponatremia    Iron deficiency anemia    Abdominal pain    Cardiac transplant rejection (CMS/[facility])    Acute combined systolic (congestive) and diastolic (congestive) heart failure (CMS/[facility])      CRRT Management Note:    - Patient was seen and examined while on CVVH   - Lab was reviewed  - CVVH order was reviewed  -Discussed with primary team  -Discussed with RN   - Will continue CVVH for now  -Daily evaluation for indications for CVVH and will convert it to regular IHD once the patient is more hemodynamically stable.    Heart and Kidney transplant Candidacy :   Will meet the OPTN eligibility criteria for kidney transplant on 3/29/24 for sustained HIRAM over 6 weeks. Pre tx work up - per heart tx team.   Note:  GFR 2/17/24=23  CVVH started 2/19 and has remained on CVVH still    [Sustained acute kidney injury : At least one of the  following, or a combination of both of the following, for the last 6 weeks:    That the candidate has been on dialysis at least once every 7 days.    That the candidate has a measured or calculated CrCl or GFR less than or equal to 25 mL/min at least once every 7 days]      * Case was discussed with primary team.  For questions, please contact transplant nephrology page [phone]    Russ Bergeron    Transplant Nephrologist
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
## 2024-03-28 NOTE — PROGRESS NOTES
Physical Therapy                 Therapy Communication Note    Patient Name: Charla Bowles  MRN: [id number]  Today's Date: 3/28/2024     Discipline: Physical Therapy    Missed Visit Reason: Missed Visit Reason:  (Pt with axillary impella, R fem ECMO, intubated/sedated at this time.  Will hold PT and re-attempt once medically appropirate.)    Missed Time: Attempt

## 2024-03-28 NOTE — PROGRESS NOTES
"Charla Bowles is a 34 y.o. female on day 42 of admission presenting with Cardiogenic shock (CMS/HCC).    Patient requires ECMO support. Drainage cannula site, cannula, pump, oxygenator, return cannula and return cannula site clinically inspected. RPM and sweep reviewed and adjusted appropriate to clinical context. Anticoagulation status and intensity discussed. Plan for weaning, next clinical steps discussed with team and family. Discussed with cardiothoracic surgeon, perfusion, palliative care and physical/occupational therapy    ECMO Indication: CS  ECMO Cannula Configuration: fem-fem VA  ECMO circuit run from drainage cannula to pump to oxygenator to return cannula: y  Presence of cannula bleeding: n  Presence of oxygenator clot: n  Pre/post PaO2 adequate: y  LV Unloading/Pulse Pressure: impella  Distal Perfusion: ok  Anticoagulation Plan/Monitoring: heparin  Mobility Plan/Candidacy: no  Path to decannulation/anticipated decannulation date:unclear      dexmedeTOMIDine, 0.1-1.5 mcg/kg/hr, Last Rate: 0.5 mcg/kg/hr (03/28/24 1400)  DOBUTamine, 2.5-20 mcg/kg/min (Dosing Weight), Last Rate: Stopped (03/28/24 1100)  heparin Impella Purge 25 units/mL in 500 mL D5W, 10 mL/hr, Last Rate: 10 mL/hr (03/28/24 1400)  lactated Ringer's, 20 mL/hr, Last Rate: 20 mL/hr (03/28/24 1400)  lactated Ringer's, 5 mL/hr, Last Rate: 5 mL/hr (03/28/24 1400)  PrismaSol 4/2.5, 25 mL/kg/hr  propofol, 0-50 mcg/kg/min, Last Rate: Stopped (03/28/24 1140)          Vent Mode: CPAP  FiO2 (%):  [40 %-100 %] 40 %  S RR:  [10] 10  S VT:  [300 mL] 300 mL  PEEP/CPAP (cm H2O):  [5 cm H20-8 cm H20] 5 cm H20  FL SUP:  [5 cm H20-8 cm H20] 8 cm H20  MAP (cm H2O):  [11-18] 11                 Last Recorded Vitals  Blood pressure (!) 112/105, pulse 87, temperature 36.5 °C (97.7 °F), resp. rate (!) 39, height 1.549 m (5' 0.98\"), weight 96 kg (211 lb 10.3 oz), SpO2 (!) 89 %.  Intake/Output last 3 Shifts:  I/O last 3 completed shifts:  In: 3439.9 (35.8 " mL/kg) [I.V.:2139.9 (22.3 mL/kg); Blood:1300]  Out: 1598 (16.6 mL/kg) [Urine:30 (0 mL/kg/hr); Other:1548; Blood:20]  Weight: 96 kg         Assessment/Plan   Principal Problem:    Cardiogenic shock (CMS/HCC)  Active Problems:    Heart transplant recipient (CMS/HCC)    ESRD (end stage renal disease) on dialysis (CMS/HCC)    HIRAM (acute kidney injury) (CMS/HCC)    Acute passive congestion of liver    Hyponatremia    Iron deficiency anemia    Abdominal pain    Systolic congestive heart failure (CMS/HCC)    Cardiac transplant rejection (CMS/HCC)    Acute combined systolic (congestive) and diastolic (congestive) heart failure (CMS/HCC)        Quang Mehta MD

## 2024-03-28 NOTE — PROGRESS NOTES
[facility]ICU Attending Note    Principal Problem:    Cardiogenic shock (CMS/[facility])  Active Problems:    Heart transplant recipient (CMS/[facility])    ESRD (end stage renal disease) on dialysis (CMS/[facility])    HIRAM (acute kidney injury) (CMS/[facility])    Acute passive congestion of liver    Hyponatremia    Iron deficiency anemia    Abdominal pain    Systolic congestive heart failure (CMS/[facility])    Cardiac transplant rejection (CMS/[facility])    Acute combined systolic (congestive) and diastolic (congestive) heart failure (CMS/[facility])    Brienjackelyn Bowles is now cardiovascularly supported with right femoral ECMO, and right axillary Impella devices.  Will work today to wean   vasopressor agents, and dobutamine.  She is also likely to be extubated today.    Immunosuppression will be continued (tacrolimus 0.5 mg twice daily, Myfortic 180 mg twice daily 10 mg once daily, prednisone).  May have to hold Myfortic if unable to extubate.    She remains on RRT, and in discussion with our transplant nephrology colleagues, she is potentially listable for kidney transplantation as soon as 3/29/2024 and will be discussed at the nephrology transplant meeting today.  We will also plan for a heart transplant team presentation to assess her potential candidacy for listing as status 1 for heart kidney transplantation.    This critically ill patient continues to be at-risk for clinically significant deterioration / failure due to the above mentioned dysfunctional, unstable organ systems.  I have personally identified and managed all complex critical care issues to prevent aforementioned clinical deterioration.  Critical care time is spent at bedside and/or the immediate area and has included, but is not limited to, the review of diagnostic tests, labs, radiographs, serial assessments of hemodynamics, respiratory status, ventilatory management, and family updates.  Time spent in procedures and teaching are reported separately.    Critical care time: 60 minutes

## 2024-03-28 NOTE — PROGRESS NOTES
Charla Bowles is a 34 y.o. female on day 42 of admission presenting with Cardiogenic shock (CMS/HCC).    Subjective   Patient arrived to [facility] last evening following [facility] ECMO cannulation. Labwork shows improving shock state with lactate normalized and decreasing inopressor support. ENT consulted during ECMO placement for left anterior nare epistaxis requiring nasal packing to obtain hemostasis. Vascular surgery consulted due to loss of left distal pulses (see note for further information).     Objective     Physical Exam  Constitutional:       Appearance: She is obese. She is ill-appearing.   HENT:      Head: Normocephalic.      Comments: Left nare packed with muricel. Not actively bleeding      Mouth/Throat:      Mouth: Mucous membranes are dry.      Pharynx: Oropharynx is clear. No oropharyngeal exudate or posterior oropharyngeal erythema.   Eyes:      Extraocular Movements: Extraocular movements intact.      Conjunctiva/sclera: Conjunctivae normal.      Pupils: Pupils are equal, round, and reactive to light.   Neck:      Vascular: No JVD.   Cardiovascular:      Rate and Rhythm: Tachycardia present.      Comments: Right axillary impella in place ~45cm at hub (no hematoma), Right femoral VA ECMO in place with reperfusion catheter (site appears clean and dry). Dopplerable radial and ulnar pulses bilaterally. Unable to doppler LLE DP/PT/popliteal pulses. RLE with faint PT dopplerable pulses. Lower extremities cool with inadequate cappillary refill   Pulmonary:      Effort: Pulmonary effort is normal. No accessory muscle usage, respiratory distress or retractions.      Breath sounds: No wheezing, rhonchi or rales.      Comments: Intubated via ETT. Chest wall moving symmetrically, minimal inline secretions.    Abdominal:      General: Abdomen is flat. Bowel sounds are normal. There is no distension.      Palpations: Abdomen is soft. There is no mass.      Hernia: No hernia is present.      Comments: OG in place  "  Musculoskeletal:         General: No swelling or tenderness. Normal range of motion.      Cervical back: Full passive range of motion without pain.   Skin:     General: Skin is dry.      Capillary Refill: Capillary refill takes less than 2 seconds.      Coloration: Skin is not jaundiced.      Findings: Bruising and lesion present. No erythema, laceration, rash or wound.   Neurological:      General: No focal deficit present.      Mental Status: She is alert.      Comments: Sedated on propofol          Last Recorded Vitals  Blood pressure (!) 112/105, pulse 99, temperature 36.7 °C (98.1 °F), resp. rate 20, height 1.549 m (5' 0.98\"), weight 96 kg (211 lb 10.3 oz), SpO2 97 %.  Intake/Output last 3 Shifts:  I/O last 3 completed shifts:  In: 3439.9 (35.8 mL/kg) [I.V.:2139.9 (22.3 mL/kg); Blood:1300]  Out: 1598 (16.6 mL/kg) [Urine:30 (0 mL/kg/hr); Other:1548; Blood:20]  Weight: 96 kg     Relevant Results  Scheduled medications  [Held by provider] acetaminophen, 650 mg, oral, q4h  [Held by provider] aspirin, 81 mg, oral, Daily  calcitriol, 0.5 mcg, oral, Daily  cephalexin, 500 mg, oral, q12h TRICIA  [Held by provider] cyclobenzaprine, 5 mg, oral, BID  darbepoetin floyd, 100 mcg, subcutaneous, q14 days  ferrous sulfate (325 mg ferrous sulfate), 65 mg of iron, oral, Daily with breakfast  folic acid, 1 mg, oral, Daily  heparin (porcine), , ,   heparin, , ,   insulin lispro, 0-15 Units, subcutaneous, q4h  levothyroxine, 200 mcg, oral, Daily  lidocaine, 1 patch, transdermal, Daily  multivitamin with minerals, 1 tablet, oral, Daily  mycophenolate, 180 mg, oral, BID  nystatin, 5 mL, Swish & Swallow, q6h  oxygen, , inhalation, Continuous - Inhalation  pantoprazole, 40 mg, intravenous, BID  perflutren protein A microsphere, 0.5 mL, intravenous, Once in imaging  predniSONE, 10 mg, oral, Daily  [Held by provider] rosuvastatin, 40 mg, oral, Nightly  sennosides-docusate sodium, 2 tablet, oral, BID  [Held by provider] " sulfamethoxazole-trimethoprim, 80 mg of trimethoprim, oral, Daily  sulfur hexafluoride microsphr, 2 mL, intravenous, Once in imaging  tacrolimus, 0.5 mg, oral, q12h TRICIA  [Held by provider] traZODone, 25 mg, oral, Nightly  valGANciclovir, 450 mg, oral, q48h      Continuous medications  dexmedeTOMIDine, 0.1-1.5 mcg/kg/hr, Last Rate: 0.5 mcg/kg/hr (03/28/24 1200)  DOBUTamine, 2.5-20 mcg/kg/min (Dosing Weight), Last Rate: Stopped (03/28/24 1100)  heparin Impella Purge 25 units/mL in 500 mL D5W, 10 mL/hr, Last Rate: 10 mL/hr (03/28/24 1200)  lactated Ringer's, 20 mL/hr, Last Rate: 20 mL/hr (03/28/24 1200)  lactated Ringer's, 5 mL/hr, Last Rate: 5 mL/hr (03/28/24 1200)  PrismaSol 4/2.5, 25 mL/kg/hr  propofol, 0-50 mcg/kg/min, Last Rate: Stopped (03/28/24 1140)      PRN medications  PRN medications: calcium gluconate, calcium gluconate, dextrose, dextrose **OR** glucagon, dextrose **OR** glucagon, diphenhydrAMINE, glucagon, heparin (porcine), heparin, HYDROmorphone, artificial tears, magnesium sulfate, magnesium sulfate, microfibrllar collagen, mupirocin, naloxone, ondansetron, oxyCODONE, polyethylene glycol, sodium chloride     Results for orders placed or performed during the hospital encounter of 02/15/24 (from the past 24 hour(s))   Lactate   Result Value Ref Range    Lactate 3.2 (H) 0.4 - 2.0 mmol/L   Blood Gas Venous Full Panel   Result Value Ref Range    POCT pH, Venous 7.34 7.33 - 7.43 pH    POCT pCO2, Venous 44 41 - 51 mm Hg    POCT pO2, Venous 29 (L) 35 - 45 mm Hg    POCT SO2, Venous 30 (L) 45 - 75 %    POCT Oxy Hemoglobin, Venous 29.2 (L) 45.0 - 75.0 %    POCT Hematocrit Calculated, Venous 27.0 (L) 36.0 - 46.0 %    POCT Sodium, Venous 130 (L) 136 - 145 mmol/L    POCT Potassium, Venous 5.1 3.5 - 5.3 mmol/L    POCT Chloride, Venous 100 98 - 107 mmol/L    POCT Ionized Calicum, Venous 1.01 (L) 1.10 - 1.33 mmol/L    POCT Glucose, Venous 155 (H) 74 - 99 mg/dL    POCT Lactate, Venous 3.3 (H) 0.4 - 2.0 mmol/L    POCT  Base Excess, Venous -2.0 -2.0 - 3.0 mmol/L    POCT HCO3 Calculated, Venous 23.7 22.0 - 26.0 mmol/L    POCT Hemoglobin, Venous 8.9 (L) 12.0 - 16.0 g/dL    POCT Anion Gap, Venous 11.0 10.0 - 25.0 mmol/L    Patient Temperature 37.0 degrees Celsius    FiO2 21 %   Coagulation Screen   Result Value Ref Range    Protime 28.4 (H) 9.8 - 12.8 seconds    INR 2.5 (H) 0.9 - 1.1    aPTT 45 (H) 27 - 38 seconds   Blood Gas Lactic Acid, Venous   Result Value Ref Range    POCT Lactate, Venous 5.0 (HH) 0.4 - 2.0 mmol/L   Blood Gas Arterial   Result Value Ref Range    POCT pH, Arterial 7.36 (L) 7.38 - 7.42 pH    POCT pCO2, Arterial 30 (L) 38 - 42 mm Hg    POCT pO2, Arterial 78 (L) 85 - 95 mm Hg    POCT SO2, Arterial 97 94 - 100 %    POCT Oxy Hemoglobin, Arterial 92.7 (L) 94.0 - 98.0 %    POCT Base Excess, Arterial -7.6 (L) -2.0 - 3.0 mmol/L    POCT HCO3 Calculated, Arterial 16.9 (L) 22.0 - 26.0 mmol/L    Patient Temperature 37.0 degrees Celsius    FiO2 21 %   Blood Gas Arterial Full Panel   Result Value Ref Range    POCT pH, Arterial 7.36 (L) 7.38 - 7.42 pH    POCT pCO2, Arterial 30 (L) 38 - 42 mm Hg    POCT pO2, Arterial 78 (L) 85 - 95 mm Hg    POCT SO2, Arterial 97 94 - 100 %    POCT Oxy Hemoglobin, Arterial 92.7 (L) 94.0 - 98.0 %    POCT Hematocrit Calculated, Arterial 26.0 (L) 36.0 - 46.0 %    POCT Sodium, Arterial 130 (L) 136 - 145 mmol/L    POCT Potassium, Arterial 5.3 3.5 - 5.3 mmol/L    POCT Chloride, Arterial 100 98 - 107 mmol/L    POCT Ionized Calcium, Arterial 1.01 (L) 1.10 - 1.33 mmol/L    POCT Glucose, Arterial 213 (H) 74 - 99 mg/dL    POCT Lactate, Arterial 6.9 (HH) 0.4 - 2.0 mmol/L    POCT Base Excess, Arterial -7.6 (L) -2.0 - 3.0 mmol/L    POCT HCO3 Calculated, Arterial 16.9 (L) 22.0 - 26.0 mmol/L    POCT Hemoglobin, Arterial 8.8 (L) 12.0 - 16.0 g/dL    POCT Anion Gap, Arterial 18 10 - 25 mmo/L    Patient Temperature 37.0 degrees Celsius    FiO2 21 %   Blood Gas Arterial Full Panel Unsolicited   Result Value Ref Range     POCT pH, Arterial 7.32 (L) 7.38 - 7.42 pH    POCT pCO2, Arterial 33 (L) 38 - 42 mm Hg    POCT pO2, Arterial 240 (H) 85 - 95 mm Hg    POCT SO2, Arterial 100 94 - 100 %    POCT Oxy Hemoglobin, Arterial 97.2 94.0 - 98.0 %    POCT Hematocrit Calculated, Arterial 24.0 (L) 36.0 - 46.0 %    POCT Sodium, Arterial 128 (L) 136 - 145 mmol/L    POCT Potassium, Arterial 5.7 (H) 3.5 - 5.3 mmol/L    POCT Chloride, Arterial 99 98 - 107 mmol/L    POCT Ionized Calcium, Arterial 0.95 (L) 1.10 - 1.33 mmol/L    POCT Glucose, Arterial 181 (H) 74 - 99 mg/dL    POCT Lactate, Arterial 7.0 (HH) 0.4 - 2.0 mmol/L    POCT Base Excess, Arterial -8.3 (L) -2.0 - 3.0 mmol/L    POCT HCO3 Calculated, Arterial 17.0 (L) 22.0 - 26.0 mmol/L    POCT Hemoglobin, Arterial 8.0 (L) 12.0 - 16.0 g/dL    POCT Anion Gap, Arterial 18 10 - 25 mmo/L    Patient Temperature 37.0 degrees Celsius    FiO2 100 %   Prepare RBC: 4 Units, Leukocytes Reduced (CMV reduced risk)   Result Value Ref Range    PRODUCT CODE I3194W79     Unit Number [id number]     Unit ABO O     Unit RH POS     XM INTEP COMP     Dispense Status XM     Blood Expiration Date April 18, 2024 23:59 EDT     PRODUCT BLOOD TYPE 5100     UNIT VOLUME 350     PRODUCT CODE C0677T89     Unit Number [id number]     Unit ABO O     Unit RH POS     XM INTEP COMP     Dispense Status XM     Blood Expiration Date April 18, 2024 23:59 EDT     PRODUCT BLOOD TYPE 5100     UNIT VOLUME 350     PRODUCT CODE E3736I81     Unit Number [id number]     Unit ABO O     Unit RH POS     XM INTEP COMP     Dispense Status TR     Blood Expiration Date April 18, 2024 23:59 EDT     PRODUCT BLOOD TYPE 5100     UNIT VOLUME 350     PRODUCT CODE F4149I41     Unit Number [id number]     Unit ABO O     Unit RH POS     XM INTEP COMP     Dispense Status TR     Blood Expiration Date April 18, 2024 23:59 EDT     PRODUCT BLOOD TYPE 5100     UNIT VOLUME 350    Blood Gas Venous Full Panel Unsolicited   Result Value Ref Range    POCT  pH, Venous 7.28 (L) 7.33 - 7.43 pH    POCT pCO2, Venous 38 (L) 41 - 51 mm Hg    POCT pO2, Venous 49 (H) 35 - 45 mm Hg    POCT SO2, Venous 76 (H) 45 - 75 %    POCT Oxy Hemoglobin, Venous 73.9 45.0 - 75.0 %    POCT Hematocrit Calculated, Venous 17.0 (L) 36.0 - 46.0 %    POCT Sodium, Venous 131 (L) 136 - 145 mmol/L    POCT Potassium, Venous 4.4 3.5 - 5.3 mmol/L    POCT Chloride, Venous 103 98 - 107 mmol/L    POCT Ionized Calicum, Venous 1.04 (L) 1.10 - 1.33 mmol/L    POCT Glucose, Venous 206 (H) 74 - 99 mg/dL    POCT Lactate, Venous 4.6 (HH) 0.4 - 2.0 mmol/L    POCT Base Excess, Venous -8.1 (L) -2.0 - 3.0 mmol/L    POCT HCO3 Calculated, Venous 17.9 (L) 22.0 - 26.0 mmol/L    POCT Hemoglobin, Venous 5.7 (LL) 12.0 - 16.0 g/dL    POCT Anion Gap, Venous 15.0 10.0 - 25.0 mmol/L    Patient Temperature 37.0 degrees Celsius    FiO2 100 %   Blood Gas Arterial Full Panel Unsolicited   Result Value Ref Range    POCT pH, Arterial 7.35 (L) 7.38 - 7.42 pH    POCT pCO2, Arterial 31 (L) 38 - 42 mm Hg    POCT pO2, Arterial 368 (H) 85 - 95 mm Hg    POCT SO2, Arterial 100 94 - 100 %    POCT Oxy Hemoglobin, Arterial 97.6 94.0 - 98.0 %    POCT Hematocrit Calculated, Arterial 17.0 (L) 36.0 - 46.0 %    POCT Sodium, Arterial 131 (L) 136 - 145 mmol/L    POCT Potassium, Arterial 4.4 3.5 - 5.3 mmol/L    POCT Chloride, Arterial 104 98 - 107 mmol/L    POCT Ionized Calcium, Arterial 1.05 (L) 1.10 - 1.33 mmol/L    POCT Glucose, Arterial 212 (H) 74 - 99 mg/dL    POCT Lactate, Arterial 4.4 (HH) 0.4 - 2.0 mmol/L    POCT Base Excess, Arterial -7.8 (L) -2.0 - 3.0 mmol/L    POCT HCO3 Calculated, Arterial 17.1 (L) 22.0 - 26.0 mmol/L    POCT Hemoglobin, Arterial 5.8 (LL) 12.0 - 16.0 g/dL    POCT Anion Gap, Arterial 14 10 - 25 mmo/L    Patient Temperature 37.0 degrees Celsius    FiO2 100 %   Blood Gas Arterial Full Panel Unsolicited   Result Value Ref Range    POCT pH, Arterial 7.39 7.38 - 7.42 pH    POCT pCO2, Arterial 29 (L) 38 - 42 mm Hg    POCT pO2,  Arterial 413 (H) 85 - 95 mm Hg    POCT SO2, Arterial 100 94 - 100 %    POCT Oxy Hemoglobin, Arterial 97.2 94.0 - 98.0 %    POCT Hematocrit Calculated, Arterial 18.0 (L) 36.0 - 46.0 %    POCT Sodium, Arterial 128 (L) 136 - 145 mmol/L    POCT Potassium, Arterial 4.6 3.5 - 5.3 mmol/L    POCT Chloride, Arterial 101 98 - 107 mmol/L    POCT Ionized Calcium, Arterial 1.10 1.10 - 1.33 mmol/L    POCT Glucose, Arterial 232 (H) 74 - 99 mg/dL    POCT Lactate, Arterial 4.6 (HH) 0.4 - 2.0 mmol/L    POCT Base Excess, Arterial -6.7 (L) -2.0 - 3.0 mmol/L    POCT HCO3 Calculated, Arterial 17.6 (L) 22.0 - 26.0 mmol/L    POCT Hemoglobin, Arterial 6.0 (LL) 12.0 - 16.0 g/dL    POCT Anion Gap, Arterial 14 10 - 25 mmo/L    Patient Temperature 37.0 degrees Celsius    FiO2 100 %   Blood Gas Venous Full Panel Unsolicited   Result Value Ref Range    POCT pH, Venous 7.31 (L) 7.33 - 7.43 pH    POCT pCO2, Venous 42 41 - 51 mm Hg    POCT pO2, Venous 63 (H) 35 - 45 mm Hg    POCT SO2, Venous 91 (H) 45 - 75 %    POCT Oxy Hemoglobin, Venous 88.3 (H) 45.0 - 75.0 %    POCT Hematocrit Calculated, Venous 23.0 (L) 36.0 - 46.0 %    POCT Sodium, Venous 129 (L) 136 - 145 mmol/L    POCT Potassium, Venous 4.8 3.5 - 5.3 mmol/L    POCT Chloride, Venous 99 98 - 107 mmol/L    POCT Ionized Calicum, Venous 1.09 (L) 1.10 - 1.33 mmol/L    POCT Glucose, Venous 246 (H) 74 - 99 mg/dL    POCT Lactate, Venous 3.1 (H) 0.4 - 2.0 mmol/L    POCT Base Excess, Venous -4.8 (L) -2.0 - 3.0 mmol/L    POCT HCO3 Calculated, Venous 21.1 (L) 22.0 - 26.0 mmol/L    POCT Hemoglobin, Venous 7.5 (L) 12.0 - 16.0 g/dL    POCT Anion Gap, Venous 14.0 10.0 - 25.0 mmol/L    Patient Temperature 37.0 degrees Celsius    FiO2 100 %   Coox Panel, Venous Unsolicited   Result Value Ref Range    POCT Carboxyhemoglobin, Venous 2.5 %    POCT Methemoglobin, Venous 0.8 0.0 - 1.5 %   Blood Gas Arterial Full Panel   Result Value Ref Range    POCT pH, Arterial 7.42 7.38 - 7.42 pH    POCT pCO2, Arterial 31 (L) 38  - 42 mm Hg    POCT pO2, Arterial 316 (H) 85 - 95 mm Hg    POCT SO2, Arterial 100 94 - 100 %    POCT Oxy Hemoglobin, Arterial 96.5 94.0 - 98.0 %    POCT Hematocrit Calculated, Arterial 24.0 (L) 36.0 - 46.0 %    POCT Sodium, Arterial 129 (L) 136 - 145 mmol/L    POCT Potassium, Arterial 4.7 3.5 - 5.3 mmol/L    POCT Chloride, Arterial 100 98 - 107 mmol/L    POCT Ionized Calcium, Arterial 1.09 (L) 1.10 - 1.33 mmol/L    POCT Glucose, Arterial 254 (H) 74 - 99 mg/dL    POCT Lactate, Arterial 2.5 (H) 0.4 - 2.0 mmol/L    POCT Base Excess, Arterial -3.8 (L) -2.0 - 3.0 mmol/L    POCT HCO3 Calculated, Arterial 20.1 (L) 22.0 - 26.0 mmol/L    POCT Hemoglobin, Arterial 7.9 (L) 12.0 - 16.0 g/dL    POCT Anion Gap, Arterial 14 10 - 25 mmo/L    Patient Temperature 37.0 degrees Celsius    FiO2 50 %   Reticulocytes   Result Value Ref Range    Retic % 7.2 (H) 0.5 - 2.0 %    Retic Absolute 0.177 (H) 0.018 - 0.083 x10*6/uL    Reticulocyte Hemoglobin 36 28 - 38 pg    Immature Retic fraction 35.1 (H) <=16.0 %   Magnesium   Result Value Ref Range    Magnesium 2.39 1.60 - 2.40 mg/dL   Lactate Dehydrogenase   Result Value Ref Range    LDH 1,422 (H) 84 - 246 U/L   Fibrinogen   Result Value Ref Range    Fibrinogen 152 (L) 200 - 400 mg/dL   D-dimer, Non VTE   Result Value Ref Range    D-Dimer Non VTE, Quant (ng/mL FEU) 3,987 (H) <=500 ng/mL FEU   Coagulation Screen   Result Value Ref Range    Protime 37.8 (H) 9.8 - 12.8 seconds    INR 3.3 (H) 0.9 - 1.1    aPTT >200 (HH) 27 - 38 seconds   Comprehensive metabolic panel   Result Value Ref Range    Glucose 230 (H) 74 - 99 mg/dL    Sodium 133 (L) 136 - 145 mmol/L    Potassium 4.6 3.5 - 5.3 mmol/L    Chloride 100 98 - 107 mmol/L    Bicarbonate 19 (L) 21 - 32 mmol/L    Anion Gap 19 10 - 20 mmol/L    Urea Nitrogen 18 6 - 23 mg/dL    Creatinine 1.17 (H) 0.50 - 1.05 mg/dL    eGFR 63 >60 mL/min/1.73m*2    Calcium 7.7 (L) 8.6 - 10.6 mg/dL    Albumin 2.8 (L) 3.4 - 5.0 g/dL    Alkaline Phosphatase 184 (H) 33 -  110 U/L    Total Protein 5.1 (L) 6.4 - 8.2 g/dL     (H) 9 - 39 U/L    Bilirubin, Total 3.6 (H) 0.0 - 1.2 mg/dL     (H) 7 - 45 U/L   CBC and Auto Differential   Result Value Ref Range    WBC 6.9 4.4 - 11.3 x10*3/uL    nRBC 22.3 (H) 0.0 - 0.0 /100 WBCs    RBC 2.45 (L) 4.00 - 5.20 x10*6/uL    Hemoglobin 7.5 (L) 12.0 - 16.0 g/dL    Hematocrit 21.6 (L) 36.0 - 46.0 %    MCV 88 80 - 100 fL    MCH 30.6 26.0 - 34.0 pg    MCHC 34.7 32.0 - 36.0 g/dL    RDW 22.4 (H) 11.5 - 14.5 %    Platelets 44 (L) 150 - 450 x10*3/uL    Neutrophils % 89.7 40.0 - 80.0 %    Immature Granulocytes %, Automated 3.5 (H) 0.0 - 0.9 %    Lymphocytes % 2.5 13.0 - 44.0 %    Monocytes % 4.1 2.0 - 10.0 %    Eosinophils % 0.1 0.0 - 6.0 %    Basophils % 0.1 0.0 - 2.0 %    Neutrophils Absolute 6.16 1.20 - 7.70 x10*3/uL    Immature Granulocytes Absolute, Automated 0.24 0.00 - 0.70 x10*3/uL    Lymphocytes Absolute 0.17 (L) 1.20 - 4.80 x10*3/uL    Monocytes Absolute 0.28 0.10 - 1.00 x10*3/uL    Eosinophils Absolute 0.01 0.00 - 0.70 x10*3/uL    Basophils Absolute 0.01 0.00 - 0.10 x10*3/uL   Calcium, Ionized   Result Value Ref Range    POCT Calcium, Ionized 1.02 (L) 1.1 - 1.33 mmol/L   Bilirubin, Direct   Result Value Ref Range    Bilirubin, Direct 1.8 (H) 0.0 - 0.3 mg/dL   Phosphorus   Result Value Ref Range    Phosphorus 3.2 2.5 - 4.9 mg/dL   Morphology   Result Value Ref Range    RBC Morphology See Below     Polychromasia Mild     RBC Fragments Few     Bellaire Cells Few     Acanthocytes Few    Blood Gas Arterial Full Panel   Result Value Ref Range    POCT pH, Arterial 7.46 (H) 7.38 - 7.42 pH    POCT pCO2, Arterial 32 (L) 38 - 42 mm Hg    POCT pO2, Arterial 422 (H) 85 - 95 mm Hg    POCT SO2, Arterial 100 94 - 100 %    POCT Oxy Hemoglobin, Arterial 96.0 94.0 - 98.0 %    POCT Hematocrit Calculated, Arterial 23.0 (L) 36.0 - 46.0 %    POCT Sodium, Arterial 129 (L) 136 - 145 mmol/L    POCT Potassium, Arterial 4.7 3.5 - 5.3 mmol/L    POCT Chloride, Arterial  101 98 - 107 mmol/L    POCT Ionized Calcium, Arterial 1.19 1.10 - 1.33 mmol/L    POCT Glucose, Arterial 173 (H) 74 - 99 mg/dL    POCT Lactate, Arterial 1.7 0.4 - 2.0 mmol/L    POCT Base Excess, Arterial -0.8 -2.0 - 3.0 mmol/L    POCT HCO3 Calculated, Arterial 22.8 22.0 - 26.0 mmol/L    POCT Hemoglobin, Arterial 7.8 (L) 12.0 - 16.0 g/dL    POCT Anion Gap, Arterial 10 10 - 25 mmo/L    Patient Temperature 37.0 degrees Celsius    FiO2 50 %   ECMO Arterial Blood Gas   Result Value Ref Range    POCT pH, Arterial ECMO 7.39 Reference range not established pH    POCT pCO2, Arterial ECMO 38 Reference range not established mm Hg    POCT pO2,  Arterial ECMO 503 Reference range not established mm Hg    POCT SO2, Arterial ECMO 100 Reference range not established %    POCT Oxy Hemoglobin, Arterial ECMO 95.4 Reference range not established %    POCT Base Excess, Arterial ECMO -1.8 Reference range not established mmol/L    POCT HCO3 Calculated, Arterial ECMO 23.0 Reference range not established mmol/L    Patient Temperature 37.0 degrees Celsius    FiO2 100 %   ECMO Venous Blood Gas   Result Value Ref Range    POCT pH, Venous ECMO 7.36 Reference range not established pH    POCT pCO2, Venous ECMO 41 Reference range not established mm Hg    POCT pO2,  Venous  ECMO 49 Reference range not established mm Hg    POCT SO2, Venous ECMO 83 Reference range not established %    POCT Oxy Hemoglobin, Venous ECMO 79.8 Reference range not established %    POCT Base Excess, Venous ECMO -2.1 Reference range not established mmol/L    POCT HCO3 Calculated, Venous ECMO 23.2 Reference range not established mmol/L    Patient Temperature 37.0 degrees Celsius    FiO2 100 %   POCT GLUCOSE   Result Value Ref Range    POCT Glucose 122 (H) 74 - 99 mg/dL   Calcium, Ionized   Result Value Ref Range    POCT Calcium, Ionized 1.11 1.1 - 1.33 mmol/L   Magnesium   Result Value Ref Range    Magnesium 2.40 1.60 - 2.40 mg/dL   Coagulation Screen   Result Value Ref Range     Protime 27.2 (H) 9.8 - 12.8 seconds    INR 2.4 (H) 0.9 - 1.1    aPTT 43 (H) 27 - 38 seconds   Fibrinogen   Result Value Ref Range    Fibrinogen 171 (L) 200 - 400 mg/dL   CBC   Result Value Ref Range    WBC 8.6 4.4 - 11.3 x10*3/uL    nRBC 24.0 (H) 0.0 - 0.0 /100 WBCs    RBC 2.38 (L) 4.00 - 5.20 x10*6/uL    Hemoglobin 7.5 (L) 12.0 - 16.0 g/dL    Hematocrit 20.9 (L) 36.0 - 46.0 %    MCV 88 80 - 100 fL    MCH 31.5 26.0 - 34.0 pg    MCHC 35.9 32.0 - 36.0 g/dL    RDW 23.0 (H) 11.5 - 14.5 %    Platelets 131 (L) 150 - 450 x10*3/uL   Renal Function Panel   Result Value Ref Range    Glucose 120 (H) 74 - 99 mg/dL    Sodium 132 (L) 136 - 145 mmol/L    Potassium 4.5 3.5 - 5.3 mmol/L    Chloride 100 98 - 107 mmol/L    Bicarbonate 26 21 - 32 mmol/L    Anion Gap 11 10 - 20 mmol/L    Urea Nitrogen 20 6 - 23 mg/dL    Creatinine 1.38 (H) 0.50 - 1.05 mg/dL    eGFR 52 (L) >60 mL/min/1.73m*2    Calcium 8.2 (L) 8.6 - 10.6 mg/dL    Phosphorus 2.5 2.5 - 4.9 mg/dL    Albumin 2.8 (L) 3.4 - 5.0 g/dL   Lactate Dehydrogenase   Result Value Ref Range    LDH 1,539 (H) 84 - 246 U/L   Hepatic function panel   Result Value Ref Range    Albumin 2.8 (L) 3.4 - 5.0 g/dL    Bilirubin, Total 3.7 (H) 0.0 - 1.2 mg/dL    Bilirubin, Direct 1.3 (H) 0.0 - 0.3 mg/dL    Alkaline Phosphatase 165 (H) 33 - 110 U/L     (H) 7 - 45 U/L     (H) 9 - 39 U/L    Total Protein 4.8 (L) 6.4 - 8.2 g/dL   Creatine Kinase   Result Value Ref Range    Creatine Kinase 75 0 - 215 U/L   ECMO Arterial Blood Gas   Result Value Ref Range    POCT pH, Arterial ECMO 7.37 Reference range not established pH    POCT pCO2, Arterial ECMO 42 Reference range not established mm Hg    POCT pO2,  Arterial ECMO 528 Reference range not established mm Hg    POCT SO2, Arterial ECMO 100 Reference range not established %    POCT Oxy Hemoglobin, Arterial ECMO 96.5 Reference range not established %    POCT Base Excess, Arterial ECMO -1.1 Reference range not established mmol/L    POCT HCO3  Calculated, Arterial ECMO 24.3 Reference range not established mmol/L    Patient Temperature 37.0 degrees Celsius    FiO2 100 %   ECMO Venous Blood Gas   Result Value Ref Range    POCT pH, Venous ECMO 7.34 Reference range not established pH    POCT pCO2, Venous ECMO 45 Reference range not established mm Hg    POCT pO2,  Venous  ECMO 51 Reference range not established mm Hg    POCT SO2, Venous ECMO 85 Reference range not established %    POCT Oxy Hemoglobin, Venous ECMO 79.8 Reference range not established %    POCT Base Excess, Venous ECMO -1.7 Reference range not established mmol/L    POCT HCO3 Calculated, Venous ECMO 24.3 Reference range not established mmol/L    Patient Temperature 37.0 degrees Celsius    FiO2 100 %   Blood Gas Arterial Full Panel   Result Value Ref Range    POCT pH, Arterial 7.32 (L) 7.38 - 7.42 pH    POCT pCO2, Arterial 44 (H) 38 - 42 mm Hg    POCT pO2, Arterial 454 (H) 85 - 95 mm Hg    POCT SO2, Arterial 100 94 - 100 %    POCT Oxy Hemoglobin, Arterial 96.3 94.0 - 98.0 %    POCT Hematocrit Calculated, Arterial 25.0 (L) 36.0 - 46.0 %    POCT Sodium, Arterial 130 (L) 136 - 145 mmol/L    POCT Potassium, Arterial 4.6 3.5 - 5.3 mmol/L    POCT Chloride, Arterial 101 98 - 107 mmol/L    POCT Ionized Calcium, Arterial 1.13 1.10 - 1.33 mmol/L    POCT Glucose, Arterial 106 (H) 74 - 99 mg/dL    POCT Lactate, Arterial 0.9 0.4 - 2.0 mmol/L    POCT Base Excess, Arterial -3.2 (L) -2.0 - 3.0 mmol/L    POCT HCO3 Calculated, Arterial 22.7 22.0 - 26.0 mmol/L    POCT Hemoglobin, Arterial 8.3 (L) 12.0 - 16.0 g/dL    POCT Anion Gap, Arterial 11 10 - 25 mmo/L    Patient Temperature 37.0 degrees Celsius    FiO2 100 %   Tacrolimus level   Result Value Ref Range    Tacrolimus  10.0 <=15.0 ng/mL   Heparin Assay   Result Value Ref Range    Heparin Unfractionated <0.1 See Comment Below for Therapeutic Ranges IU/mL   Blood Gas Arterial Full Panel   Result Value Ref Range    POCT pH, Arterial 7.41 7.38 - 7.42 pH    POCT pCO2,  Arterial 37 (L) 38 - 42 mm Hg    POCT pO2, Arterial 423 (H) 85 - 95 mm Hg    POCT SO2, Arterial 100 94 - 100 %    POCT Oxy Hemoglobin, Arterial 95.6 94.0 - 98.0 %    POCT Hematocrit Calculated, Arterial 27.0 (L) 36.0 - 46.0 %    POCT Sodium, Arterial 128 (L) 136 - 145 mmol/L    POCT Potassium, Arterial 4.7 3.5 - 5.3 mmol/L    POCT Chloride, Arterial 101 98 - 107 mmol/L    POCT Ionized Calcium, Arterial 1.15 1.10 - 1.33 mmol/L    POCT Glucose, Arterial 131 (H) 74 - 99 mg/dL    POCT Lactate, Arterial 0.9 0.4 - 2.0 mmol/L    POCT Base Excess, Arterial -1.0 -2.0 - 3.0 mmol/L    POCT HCO3 Calculated, Arterial 23.5 22.0 - 26.0 mmol/L    POCT Hemoglobin, Arterial 9.0 (L) 12.0 - 16.0 g/dL    POCT Anion Gap, Arterial 8 (L) 10 - 25 mmo/L    Patient Temperature 37.0 degrees Celsius    FiO2 50 %   ACTIVATED CLOTTING TIME LOW   Result Value Ref Range    POCT Activated Clotting Time Low Range 171 83 - 199 sec     *Note: Due to a large number of results and/or encounters for the requested time period, some results have not been displayed. A complete set of results can be found in Results Review.       XR chest 1 view    Result Date: 3/28/2024  Interpreted By:  Sandra Griffith and Stephens Katherine STUDY: XR CHEST 1 VIEW;  3/27/2024 8:38 pm   INDICATION: Signs/Symptoms:Post op cardiac surgery.   COMPARISON: Chest radiograph 03/27/2024   ACCESSION NUMBER(S): [id number]   ORDERING CLINICIAN: YESENIA KHAN   FINDINGS: AP radiograph of the chest was provided.   Endotracheal tube noted with tip projecting approximately 2.0 cm in the right mainstem bronchus. Enteric tube seen coursing below the level diaphragm with tip out of the field of view. Right subclavian Impella device overlying the cardiomediastinal silhouette. Right IJ central venous catheter with tip overlying the mid SVC. ECMO cannula with tip overlying the expected location of the right atrium. Surgical clips overlie the cardiomediastinal silhouette and right axilla.  Cardiac device projects over the cardiomediastinal silhouette. Postsurgical changes from median sternotomy.   CARDIOMEDIASTINAL SILHOUETTE: The cardiomediastinal silhouette is obscured. The mediastinum is shifted to the left likely secondary atelectasis.   LUNGS: Hazy opacification of the left hemithorax. Right basilar atelectasis. No pneumothorax.   ABDOMEN: No remarkable upper abdominal findings.   BONES: No acute osseous changes.       1.  Endotracheal tube with tip in the right mainstem bronchus, retraction of at least 2 cm is recommended. 2. Hazy opacification of the left hemithorax with leftward mediastinal shift, likely secondary atelectasis. 3. Postsurgical changes and medical devices as described above.   I personally reviewed the images/study and I agree with Charity Ross DO's (radiology resident) findings as stated. This study was interpreted at [facility], [place], [place].   MACRO: Charity Ross discussed the significance and urgency of this critical finding by telephone with  YESENIA KHAN on 3/27/2024 at 9:37 pm.  (**-RCF-**) Findings:  See findings.     Signed by: Sandra Griffith 3/28/2024 9:19 AM Dictation workstation:   [id number]    Vascular US Lower Extremity Arterial Duplex Bilateral    Result Date: 3/28/2024  Preliminary Cardiology Report           [place] [address]   Tel [phone] and Fax [phone]                 Preliminary Vascular Lab Report  VASC US LOWER EXTREMITY ARTERIAL DUPLEX GRAFT BILATERAL W/ KATHIE  Patient Name:     MIGUEL DIMAS      Reading           [id number] Carolina BASS                Physician:        MD Study Date:       3/27/2024            Ordering          [id number] FIONA WHYTE                                        Physician: MRN/PID:          [id number]             Technologist:     Evangelina Bailey RVT Accession#:       [id number]          Technologist 2:   Joy Lewis RVT Date of           1990             Encounter#:       [id number] Birth/Age: Gender:           F Admission Status: Inpatient            Location          [facility]                                        Performed:  Diagnosis/ICD: Acute Kidney Failure-N17.9; Encounter for other preprocedural                examination-Z01.818 Indication:    pre ECMO placement Procedure/CPT: 52241 Peripheral artery Lower arterial Duplex BPG complete  **Report Amended** Report Amended By: Evangelina Bailey RVT     Date and Time: 3/28/2024 at 3/28/2024  **CRITICAL RESULT** Critical Result: Left SFA prox occlusion with reconstitution via collateralized flow noted distally. Notification called to Pretty Toussaint MD on 3/27/2024 at 3:20:25 PM by Evangelina Bailey RVT.  PRELIMINARY CONCLUSIONS: Right Lower Arterial: Evaluated right EIA distal, CFA, SFA prox, and Profunda appear patent. Left Lower Arterial: There appears to be a short segment occlusion noted in the proximal SFA just distal to the CFA bifurcation. There is collateralized flow noted distally. There are elevated velocities noted in the left CFA, may be overestimated due to shadowing from previous procedures. Right Lower Venous: The evaluated right EIV distal, CFV, FV prox, and DFV appear patent with no evidence of acute deep vein thrombus. Left Lower Venous: Cannot rule out thrombus in non-compressible iliac and common femoral vein veins. Patient unable to tolerate compressions. Color flow and Doppler noted. No ultrasonic evidence of acute deep vein thrombus in the evaluated left EIV distal, CFV, FV prox, and DFV.  Additional Findings: Irregular heart rhythm noted due to cardiac device. Left groin was technically difficult due to previous procedures and scarring.  Imaging & Doppler Findings:  Right                 Compressible Thrombus   Flow Distal External Iliac     Yes        None   Pulsatile CFV                       Yes         None   Pulsatile PFV                       Yes        None   Pulsatile FV Proximal               Yes        None   Pulsatile  Left                  Compress Thrombus   Flow Distal External Iliac                   Pulsatile CFV                                     Pulsatile PFV                                     Pulsatile FV Proximal             Yes      None   Pulsatile  Right                     Left   PSV                       PSV 56 cm/s        EIA        30 cm/s 29 cm/s        CFA        72 cm/s 36 cm/s Profunda Proximal 70 cm/s 40 cm/s   SFA Proximal    28 cm/s  VASCULAR PRELIMINARY REPORT completed by Evangelina Bailey RVT on 3/28/2024 at 7:32:14 AM  ** Final (Updated) **     XR abdomen 1 view    Result Date: 3/27/2024  Interpreted By:  Sandra Griffith and Sheng Max STUDY: XR ABDOMEN 1 VIEW; XR CHEST 1 VIEW;  3/26/2024 7:12 pm; 3/27/2024 7:44 am   INDICATION: Signs/Symptoms:S/p OHT r/o ileas; Signs/Symptoms:Impella.   COMPARISON: Chest radiographs dated 03/26/2024, 03/25/2024, 03/22/2024 and CT chest abdomen pelvis dated 03/17/2024   ACCESSION NUMBER(S): [id number]; [id number]   ORDERING CLINICIAN: FILOMENA KEANE   FINDINGS: AP radiographs of the chest and abdomen were provided:   LINES AND DEVICES: Right subclavian approach Impella device projects over the left ventricle. Right internal jugular approach central venous catheter tip projects over the lower SVC. CardioMEMS device projects over the cardiomediastinal silhouette. Numerous cardiac pacing wires are present. Surgical clips project over the cardiomediastinal silhouette and right axilla. Numerous intact median sternotomy wires.   CARDIOMEDIASTINAL SILHOUETTE: Stable enlargement of the cardiomediastinal silhouette is stable in size and configuration.   LUNGS: There is no pulmonary edema. Linear subsegmental bibasilar atelectasis. Trace bilateral pleural effusions. No focal consolidation or pneumothorax is seen.   ABDOMEN: Paucity  of gas in the imaged bowel loops with nonobstructive bowel gas pattern. Limited evaluation of pneumoperitoneum on supine imaging, however no gross evidence of free air is noted.   BONES: No acute osseous abnormality.       1. Stable enlargement of the cardiomediastinal silhouette with trace bilateral pleural effusions. 2. Paucity of gas in the imaged bowel loops with nonobstructive bowel gas pattern.     I personally reviewed the images/study and I agree with the findings as stated by Dr. Tee Joe. This study was interpreted at [place], [place].   MACRO: none   Signed by: Sandra Griffith 3/27/2024 2:51 PM Dictation workstation:   [id number]    XR chest 1 view    Result Date: 3/27/2024  Interpreted By:  Sandra Griffith and Sheng Max STUDY: XR ABDOMEN 1 VIEW; XR CHEST 1 VIEW;  3/26/2024 7:12 pm; 3/27/2024 7:44 am   INDICATION: Signs/Symptoms:S/p OHT r/o ileas; Signs/Symptoms:Impella.   COMPARISON: Chest radiographs dated 03/26/2024, 03/25/2024, 03/22/2024 and CT chest abdomen pelvis dated 03/17/2024   ACCESSION NUMBER(S): [id number]; [id number]   ORDERING CLINICIAN: FILOMENA KEANE   FINDINGS: AP radiographs of the chest and abdomen were provided:   LINES AND DEVICES: Right subclavian approach Impella device projects over the left ventricle. Right internal jugular approach central venous catheter tip projects over the lower SVC. CardioMEMS device projects over the cardiomediastinal silhouette. Numerous cardiac pacing wires are present. Surgical clips project over the cardiomediastinal silhouette and right axilla. Numerous intact median sternotomy wires.   CARDIOMEDIASTINAL SILHOUETTE: Stable enlargement of the cardiomediastinal silhouette is stable in size and configuration.   LUNGS: There is no pulmonary edema. Linear subsegmental bibasilar atelectasis. Trace bilateral pleural effusions. No focal consolidation or pneumothorax is seen.   ABDOMEN:  Paucity of gas in the imaged bowel loops with nonobstructive bowel gas pattern. Limited evaluation of pneumoperitoneum on supine imaging, however no gross evidence of free air is noted.   BONES: No acute osseous abnormality.       1. Stable enlargement of the cardiomediastinal silhouette with trace bilateral pleural effusions. 2. Paucity of gas in the imaged bowel loops with nonobstructive bowel gas pattern.     I personally reviewed the images/study and I agree with the findings as stated by Dr. Tee Joe. This study was interpreted at [place], [place].   MACRO: none   Signed by: Sandra Griffith 3/27/2024 2:51 PM Dictation workstation:   [id number]    Transthoracic Echo (TTE) Complete    Result Date: 3/27/2024   [facility], [address] [place] [address]                Tel [phone] and Fax [phone] TRANSTHORACIC ECHOCARDIOGRAM REPORT  Patient Name:      MIGUEL DIMAS      Reading Physician:    [id number] Patrica BASS MD Study Date:        3/27/2024            Ordering Provider:    [id number] KONSTANTIN PERKINS MRN/PID:           [id number]             Fellow: Accession#:        [id number]         Nurse: Date of Birth/Age: 1990 / 34 years  Sonographer:          MAURO Colbert RDCS Gender:            F                    Additional Staff: Height:            152.40 cm            Admit Date:           2/15/2024 Weight:            95.71 kg             Admission Status:     Inpatient - STAT BSA / BMI:         1.91 m2 / 41.21      Encounter#:           [id number]                    kg/m2                                         Department Location:  [address] Blood Pressure: 93 /57 mmHg Study Type:    TRANSTHORACIC ECHO (TTE) COMPLETE Diagnosis/ICD: Heart  transplant rejection-T86.21 Indication:    Heart transplant rejection  Study Detail: The following Echo studies were performed: 2D, Doppler and color               flow. Definity used as a contrast agent for endocardial border               definition. Total contrast used for this procedure was 3 mL via IV               push. The patient was awake.  PHYSICIAN INTERPRETATION: Left Ventricle: The left ventricular systolic function is severely decreased, with an estimated ejection fraction of 25%. There is global hypokinesis of the left ventricle with minor regional variations. The left ventricular cavity size is normal. Left ventricular diastolic filling was indeterminate. There is no definite left ventricular thrombus visualized. Echocontrast was used and excluded LV apical thrombus. Left Atrium: The left atrium is consistent with transplant. Right Ventricle: The right ventricle is mildly enlarged. There is reduced right ventricular systolic function. Right Atrium: The right atrium is mildly dilated. Aortic Valve: The aortic valve was not well visualized. There is indeterminate aortic valve regurgitation. Mitral Valve: The mitral valve is mildly thickened. There is moderate to severe mitral valve regurgitation which is centrally directed. Tricuspid Valve: The tricuspid valve is structurally normal. There is severe tricuspid regurgitation. The Doppler estimated RVSP is within normal limits at 28.8 mmHg. RSVP likely underestimated due to the severity of TR. Pulmonic Valve: The pulmonic valve is not well visualized. Pulmonic valve regurgitation was not assessed. Pericardium: There is a trivial pericardial effusion. Pleural: There is left pleural effusion. Aorta: The aortic root is normal. In comparison to the previous echocardiogram(s): Compared with the prior exam from 3/25/2024, there is still severe global LV systolic dysfunction while on full Impella suppoert. There was already moderate to severe MR which persists.  TR was not assessed on the prior study but is severe TR today. Although the RVSP is estimated to be normal based on TR Vmax, that is likely underestimated due to the severity of TR. There was previously severely reduced RV function previously and appears imilar today.  LEFT VENTRICULAR ASSIST DEVICE: LVAD: The patient has a(n) Impella LVAD device present. LVAD Comments: IMpella extends approx 3.8-4.0 cm beneath the AV and is angles posteriorly.  CONCLUSIONS:  1. Left ventricular systolic function is severely decreased with a 25% estimated ejection fraction.  2. Echocontrast was used and excluded LV apical thrombus.  3. No left ventricular thrombus visualized.  4. There is reduced right ventricular systolic function.  5. Moderate to severe mitral valve regurgitation.  6. RVSP within normal limits.  7. Severe tricuspid regurgitation visualized.  8. Compared with the prior exam from 3/25/2024, there is still severe global LV systolic dysfunction while on full Impella suppoert. There was already moderate to severe MR which persists. TR was not assessed on the prior study but is severe TR today. Although the RVSP is estimated to be normal based on TR Vmax, that is likely underestimated due to the severity of TR. There was previously severely reduced RV function previously and appears imilar today.  9. There is global hypokinesis of the left ventricle with minor regional variations. QUANTITATIVE DATA SUMMARY: 2D MEASUREMENTS:                          Normal Ranges: IVSd:          0.90 cm   (0.6-1.1cm) LVPWd:         0.90 cm   (0.6-1.1cm) LVIDd:         4.20 cm   (3.9-5.9cm) LVIDs:         3.30 cm LV Mass Index: 62.1 g/m2 LV % FS        21.4 % AORTA MEASUREMENTS:                      Normal Ranges: Ao Sinus, d: 2.00 cm (2.1-3.5cm) LV SYSTOLIC FUNCTION BY 2D PLANIMETRY (MOD):                     Normal Ranges: EF-A4C View: 25.6 % (>=55%) EF-A2C View: 26.2 % EF-Biplane:  35.2 %  RIGHT VENTRICLE: RV Basal 3.50 cm RV Mid    2.40 cm RV Major 7.0 cm TAPSE:   6.5 mm TRICUSPID VALVE/RVSP:                             Normal Ranges: Peak TR Velocity: 2.54 m/s RV Syst Pressure: 28.8 mmHg (< 30mmHg)  [id number] Patrica Aguilera MD Electronically signed on 3/27/2024 at 1:20:02 PM  ** Final **         This patient has a urinary catheter   Reason for the urinary catheter remaining today? Urine catheter unnecessary, will be removed today    This patient is intubated   Reason for patient to remain intubated today? they are planned for extubation trial later today/tonight      Malnutrition Diagnosis Status: Ongoing  Malnutrition Diagnosis: Moderate malnutrition related to acute disease or injury  As Evidenced by: pt's reported intake likely meeting <75% of estimated needs for at least 7 days, previous 5% weight loss in 1 month, LOS 42.  I agree with the dietitian's malnutrition diagnosis.      Assessment/Plan   Ms. Yimi Bowles is a 34F with a PMHx sig for stage D HFrEF (PPCM) s/p ICD s/p CardioMEMs, hypothyroidism 2/2 thyroidectomy, obesity s/p gastric bypass (2017), and SLE who is s/p OHT (3/31/2022) with a post-OHT course complicated by RIJ/RUE DVTs, leukopenia, left groin seroma, and asymptomatic 2R ACR (11/2022) treated with steroids, s/p total hysterectomy (2023), Flu A (1/2024).     Originally presented to [facility] ED on 2/15/24 with complaints of N/V x 3 days and inability to keep medications down. Found to have acute systolic heart failure with primary graft failure and cardiogenic shock. Treated for suspected acute cellular vs. acute antibody mediated rejection; however, multiple cardiac biopsies negative for signs of rejection or other causes of graft failure. Course has been complicated by multiple MCS devices for refractory cardiogenic shock (right femoral IABP: 2/18/24 - 3/1/24, Left femoral VA ECMO: 2/19/24 - 2/29/24, Right axillary impella 5.5: 3/1/24 - remains in place, 2nd right femoral VA ECMO: 3/27/24 - remains in place), ARF requiring RRT,  acute liver injury, intubation due to volume overload in setting of pulmonary edema, severe hemolysis 2/2 to impella malposition, mild CMV viremia, bilateral provoked IJ DVTs, multiple episodes of epistaxis & coagulopathies requiring ongoing blood product transfusions.       Transferred to CTICU on 3/27/24 following right femoral VA ECMO placement due to worsening cardiogenic shock and multi-organ failure despite Impella 5.5 support and multiple inotropes.       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  #Acute renal failure requiring RRT   #Acute transaminitis  #Epistaxis   #Acute coagulopathy   #Low grade CMV Viremia  #Provoked bilateral IJ DVTs       Donor/Recipient Infectious history:  CMV: -/+ (low grade CMV viremia w/ levels < 35 on 3/1/24, repeat CMV levels remain negative since 3/8/24)  Toxo: -/-   Hep C: -/-     Rejection/Prophylaxis (transplants):  - Steroids: Continue 10mg PO prednisone daily  - Tacrolimus: 0.5mg liquid @ 0630 and 0.5mg liquid @ 1830 w/ daily levels drawn @ 0600  - Tacrolimus goal troughs: 8-10  - Myfortic acid: 180mg BID    - Antifungals: nystatin 500,000units Q6  - Antivirals: valcyte 450mg Q48hrs   - Anti PCP & Toxoplasmosis: holding Bactrim SS daily due to thrombocytopenia     Last cardiac biopsy: 2/16/24 with ACR grade 1R and no AMR (extremely low C4d+ detected), 2/20/24 negative for ACR and AMR  Last HLA: 3/2/24 negative for DSAs   Last RHC: closing numbers on 3/16/24 /86(97) RA 20, PAP 40/33(37), C.O./C.I. 5.5/2.8, PVR 88, SVR 1115   Last LHC: 2/18/24 negative for CAV and CAD   Last TTE/BOB: 3/27/24 shows LVEF 25% w/ global hypokinesis, severe RV dysfunction, mod-severe MR, severe TR and impella angled posteriorly   Osteopenia/osteoporosis prophylaxis: Vitamin D3 and calcium supplements  Peptic/gastric ulcer prophylaxis: Pantoprazole 40mg BID -> can transition to daily in next 24-48hrs if  no signs of UGIB   CAV Prophylaxis: Holding Aspirin 81mg due to continued thrombocytopenia & pravastatin due to transaminitis     - Unclear cause of acute severe graft dysfunction. Broad differential including SLE flair, giant cell vasculitis, CAV/CAD, viral cardiomyopathy, sarcoidosis, amyloidosis and allograft rejection amongst many others. Most likely smoldering ACR vs. AMR; however, 2xallograft biopsies on 2/16 & 2/20 remain negative for any significant pathology. Notably, both biopsies taken after rejection therapies implemented which may have reduced areas of graft damage.    - DSAs remain negative; however, patient may have non-HLA antibodies present. 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   - Negative CAV & CAD via LHC on 2/18/24   - Completed methylpred steroid pulse w/ 1g Q24 x 3 days (2/16-2/18) and prednisone taper   - Thymoglobulin doses: 2/18 & 2/19   - IVIG + PLEX Session: 2/18, 2/20, 3/7, 3/11 and 3/13    - Right femoral VA ECMO flowing 3.5L w/ FIO2 100% and sweep 1.5  - Right axillary impella for LV venting remains in place and set P6 w/ flows of 1.9-2.3  - Epinephrine 0.04mcg/kg/min and dobutamine 5.0mcg/kg/min   - Decreased pulse pressure of ~5-15 on A-line with pulsatile flow.  - Lactate normalized    - Remains sedated with propofol and intubated via ETT: Vent lung protective and set ACVC FIO2 50%, RR 10,  and PEEP 8   - LFTs improving s/p ECMO cannulation with stable hemolytic labwork   - Previously cardiorenal induced ARF resolved w/ renal recovery following ECMO cannulation. RRT stopped on 2/27/24 and patient had been making appropriate UOP. Post ECMO decannulation, again developed ARF requiring RRT. Her kidneys have had multiple injuries due to poor cardiac output and now with hemolysis induced injury. Will have been on RRT x 6 weeks as of 3/29/24, meeting criteria for kidney transplantation. Abdominal Transplant team will formally approve for kidney transplantation pending  approval for heart transplantation.    - At this time, the patient has developed refractory heart failure and renal failure. Discussion for heart and kidney transplantation are ongoing. Concerns remain with deconditioning, multiorgan failure, lack of diagnosis causing graft failure, possible rejection despite appropriate immunotherapy, and optimal immunotherapy plan if re-transplanted.      Recommendations:  - Wean off inotropes today and maintain cardiopulmonary support via ECMO and impella   - Agree with line holiday once inotropes weaned off  - Agree with CVVH via ECMO circuit   - Agree with SBT and potential extubation   - Agree with holding on heparin infusion today with recent epistaxis and current coagulopathy; continue close monitoring of bilateral distal extremity perfusion   - Agree with de-escalation of IV antibiotics  - Tacro 0.5mg liquid BID   - Okay to miss today's morning dose of myfortic. If unable to extubate and/or pass swallow eval, will discuss options for evening dose.   - Recommend removal of wheatley catheter to limit infectious sources   - Abdominal team discussed patient today at selection committee and will approve for kidney transplantation pending approval of heart transplantation   - Plan to discuss heart transplantation at thoracic transplant selection meeting on 4/2/24     Appreciate continued CTICU care/management.     Patient was examined and discussed with multidisciplinary team & Advanced Heart Failure attending Dr. Toussaint      Heart Transplant Team:   [employer] Secure Chat  [id number] during day shift     Keith Jain CNP

## 2024-03-28 NOTE — PROGRESS NOTES
"Nutrition Follow Up Assessment:   Nutrition Assessment    Reason for Assessment: Dietitian discretion (follow up)    Follow up, last nutrition visit on 3/21 (LOS 42)     Pt transferred to [facility] after continued low cardiac output, rising LDH, re-initiation of [facility]O on 3/27 for refractory cardiogenic shock with multisystem organ failure.     Plan to extubate pt today and resume oral diet. TF recs given, depending on outcome.    Nutrition History:  Food and Nutrient History: Pt now intubated, on pressor support. Currently has OG access.  Vitamin/Herbal Supplement Use: calcitrol, iron, folic acid, MVI w minerals       Anthropometrics:  Height: 154.9 cm (5' 0.98\")   Weight: 96 kg (211 lb 10.3 oz)   BMI (Calculated): 40.84  IBW/kg (Dietitian Calculated): 47.7 kg  Percent of IBW: 201 %  Adjusted Body Weight (kg): 59 kg    Weight History:     Weight Change %:  Weight History / % Weight Change: Weight flucuating slightly, likely d/t fluid. Previously noted loss on 5% in 1 month.    Nutrition Focused Physical Exam Findings:  defer: limited by lines  Subcutaneous Fat Loss:      Muscle Wasting:     Edema:  Edema: +1 trace, +3 moderate  Edema Location: generalized  Physical Findings:  Skin: Positive (unhealed cannulation site from previous [facility] ECMO)    Nutrition Significant Labs:  CBC Trend:   Results from last 7 days   Lab Units 03/28/24  0146 03/27/24  1949 03/27/24  0351 03/26/24  0532   WBC AUTO x10*3/uL 8.6 6.9 6.7 6.9   RBC AUTO x10*6/uL 2.38* 2.45* 2.83* 2.90*   HEMOGLOBIN g/dL 7.5* 7.5* 8.5* 8.9*   HEMATOCRIT % 20.9* 21.6* 25.6* 26.3*   MCV fL 88 88 91 91   PLATELETS AUTO x10*3/uL 131* 44* 64* 60*    , BG POCT trend:   Results from last 7 days   Lab Units 03/28/24  0141 03/27/24  1122 03/27/24  0751 03/26/24  2219 03/26/24  1526   POCT GLUCOSE mg/dL 122* 147* 113* 215* 151*    , Liver Function Trend:   Results from last 7 days   Lab Units 03/28/24  0157 03/27/24  1949 03/27/24  1020 03/27/24  0351   ALK PHOS U/L 165* " 184* 239* 252*   AST U/L 383* 353* 411* 413*   ALT U/L 180* 186* 243* 252*   BILIRUBIN TOTAL mg/dL 3.7* 3.6* 4.0* 3.6*    , Renal Lab Trend:   Results from last 7 days   Lab Units 03/28/24  0146 03/27/24  1949 03/27/24  1020 03/27/24  0351   POTASSIUM mmol/L 4.5 4.6 4.7 4.6   PHOSPHORUS mg/dL 2.5 3.2  --   --    SODIUM mmol/L 132* 133* 135* 133*   MAGNESIUM mg/dL 2.40 2.39  --  2.62*   EGFR mL/min/1.73m*2 52* 63 69 67   BUN mg/dL 20 18 14 15   CREATININE mg/dL 1.38* 1.17* 1.09* 1.12*      Nutrition Specific Medications:  calcitriol, 0.5 mcg, oral, Daily  cephalexin, 500 mg, oral, q12h TRICIA  darbepoetin floyd, 100 mcg, subcutaneous, q14 days  ferrous sulfate (325 mg ferrous sulfate), 65 mg of iron, oral, Daily with breakfast  folic acid, 1 mg, oral, Daily  insulin lispro, 0-15 Units, subcutaneous, q4h  levothyroxine, 200 mcg, oral, Daily  multivitamin with minerals, 1 tablet, oral, Daily  mycophenolate, 180 mg, oral, BID  pantoprazole, 40 mg, intravenous, BID  predniSONE, 10 mg, oral, Daily  [Held by provider] rosuvastatin, 40 mg, oral, Nightly  sennosides-docusate sodium, 2 tablet, oral, BID  tacrolimus, 0.5 mg, oral, Once  tacrolimus, 0.5 mg, oral, q12h TRICIA  [Held by provider] traZODone, 25 mg, oral, Nightly  valGANciclovir, 450 mg, oral, q48h    Continuous medications  dexmedeTOMIDine, 0.1-1.5 mcg/kg/hr, Last Rate: 0.2 mcg/kg/hr (03/28/24 0931)  DOBUTamine, 2.5-20 mcg/kg/min (Dosing Weight), Last Rate: 5 mcg/kg/min (03/28/24 0900)  lactated Ringer's, 20 mL/hr, Last Rate: 20 mL/hr (03/28/24 0900)  lactated Ringer's, 5 mL/hr, Last Rate: 5 mL/hr (03/28/24 0900)  PrismaSol 4/2.5, 25 mL/kg/hr  propofol, 0-50 mcg/kg/min, Last Rate: 50 mcg/kg/min (03/28/24 0900)    I/O:   Last BM Date: 03/23/24; Stool Appearance: Soft (03/23/24 2100)    Dietary Orders (From admission, onward)       Start     Ordered    03/27/24 1945  NPO Diet; Effective now  Diet effective now         03/27/24 1958              Estimated Needs:   Total  Energy Estimated Needs (kCal):  (4206-3704)  Method for Estimating Needs: needs while intubated. hypocaloric feeding (11-14kcal) using CBW  Total Protein Estimated Needs (g): 75 g  Method for Estimating Needs: 1.5 g/kg+ IBW  Total Fluid Estimated Needs (mL):  (per team)           Nutrition Diagnosis   Malnutrition Diagnosis  Patient has Malnutrition Diagnosis: Yes  Diagnosis Status: Ongoing  Malnutrition Diagnosis: Moderate malnutrition related to acute disease or injury  As Evidenced by: pt's reported intake likely meeting <75% of estimated needs for at least 7 days, previous 5% weight loss in 1 month, LOS 42.  Additional Assessment Information: Malnutrition is likely acute on chronic, nutritional status seems to be declining d/t pt's continued low PO intake.    Nutrition Diagnosis  Patient has Nutrition Diagnosis: Yes  Diagnosis Status (1): Ongoing  Nutrition Diagnosis 1: Increased nutrient needs  Related to (1): altered metabolism for healing/recovery from critical illness  As Evidenced by (1): reinitiation of [facility] ECMO and CVVH, s/p Impella  Additional Nutrition Diagnosis: Diagnosis 3  Diagnosis Status (2): Ongoing  Nutrition Diagnosis 2: Unintended weight gain  Related to (2): acute on chronic illness  As Evidenced by (2): up 41# since end of 3/29/23  Additional Assessment Information (2): wt now trending down from fluid removal  Diagnosis Status (3): New  Nutrition Diagnosis 3: Inadequate oral intake  Related to (3): acute events (nose bleed, nausea/vomiting, constipation)  As Evidenced by (3): reduced ability to consume PO past few days  Additional Assessment Information (3): declines ONS       Nutrition Interventions/Recommendations         Nutrition Prescription:  Individualized Nutrition Prescription Provided for : TF recs given, continue Regular diet if able.    If pt is extubated and able to resume oral diet, continue Regular diet as tolerated.    If TF are initiated --   While intubated: Pivot 1.5 @ goal  30ml/hr + 1pkt Prostat  TF @ goal provides 720ml, 1180kcal, 85g protein, 540ml free water  Propofol currently providing 153 kcal/day    If pt is extubated, but unable to resume oral diet/needs TF: Pivot 1.5 @ goal 50ml/hr  Provides 1200ml, 1800kcal, 112g protein, 900ml free water  Feeding to MSJx1.2    3. Daily vitamin/mineral supplementation recommended:  2 daily MVI, 500mcg vit B12, 500-600mg calcium/vit D        Nutrition Interventions:   Interventions: Enteral intake, Meals and snacks  Meals and Snacks: General healthful diet  Goal: goal of 50% of 1-2 meals per day  Goal: if oral diet cannot be resumed, initiate TF -- Pivot 1.5 @ goal 30ml/hr + 1 pkt Prostat AWC  Goal: Provide appropriate vitamin supplementation to patient with hx of gastric bypass. 2 chewable MVI daily, 500mcg vit B12 daily, 500-600mcg calcium/vit D daily       Nutrition Monitoring and Evaluation   Food/Nutrient Related History Monitoring  Monitoring and Evaluation Plan: Energy intake  Criteria: if PO diet is resumed, intake of 50% of 1-2 meals per day  Enteral and Parenteral Nutrition Intake: Enteral nutrition intake  Criteria: if TF is initiated, monitor for tolerance    Body Composition/Growth/Weight History  Monitoring and Evaluation Plan: Weight  Weight: Measured weight  Criteria: no significant weight changes    Biochemical Data, Medical Tests and Procedures  Monitoring and Evaluation Plan: Electrolyte/renal panel  Criteria: labs WNL            Time Spent/Follow-up Reminder:   Time Spent (min): 60 minutes  Last Date of Nutrition Visit: 03/28/24  Nutrition Follow-Up Needed?: Dietitian to reassess per policy  Follow up Comment: plan to resume PO diet. TF recs provided.

## 2024-03-28 NOTE — PROGRESS NOTES
"Spiritual Care Visit     People who were present: Patient, Palliative CNP, Bedside RN    Topics discussed: Patient's condition (patient reporting pain), patient also requesting \"us\" \"not to give up\" on her    Interventions: Provided non-anxious, supportive presence, reflective listening. Reassurance of great care and concern by the providers and inquiry into meaningful support.    Plan of Care: Palliative  to provide ongoing spiritual care                                                     "

## 2024-03-28 NOTE — CONSULTS
"Vancomycin Dosing by Pharmacy- INITIAL    Charla MONTELONGO Yimi Bowles is a 34 y.o. year old female who Pharmacy has been consulted for vancomycin dosing for other MRSA colonization . Based on the patient's indication and renal status this patient will be dosed based on a goal trough/random level of 15-20.     Renal function is currently declining. Patient on CVVH, was paused for OR case and plans to be resumed on 3/28 sometime during 1st shift per nurse    Visit Vitals  BP (!) 155/128   Pulse 100   Temp 36.8 °C (98.2 °F) (Bladder)   Resp (!) 28        Lab Results   Component Value Date    CREATININE 1.38 (H) 03/28/2024    CREATININE 1.17 (H) 03/27/2024    CREATININE 1.09 (H) 03/27/2024    CREATININE 1.12 (H) 03/27/2024        Patient weight is No results found for: \"PTWEIGHT\"    No results found for: \"CULTURE\"     I/O last 3 completed shifts:  In: 1832.4 (19.1 mL/kg) [I.V.:882.4 (9.2 mL/kg); Blood:950]  Out: 2743 (28.6 mL/kg) [Urine:20 (0 mL/kg/hr); Other:2703; Blood:20]  Weight: 96 kg   [unfilled]    Lab Results   Component Value Date    PATIENTTEMP 37.0 03/28/2024    PATIENTTEMP 37.0 03/28/2024    PATIENTTEMP 37.0 03/27/2024          Assessment/Plan     Patient has already been given a loading dose of 1500 mg in OR. Ordered an additional 500 mg x1 dose to complete load for CVVH dosing.  Will initiate vancomycin maintenance, once CVVH is restarted. Please order maintenance regimen and level when running.    Follow-up level will be ordered on 3/29 prior to third dose of vanco unless clinically indicated sooner.  Will continue to monitor renal function daily while on vancomycin and order serum creatinine at least every 48 hours if not already ordered.  Follow for continued vancomycin needs, clinical response, and signs/symptoms of toxicity.       Rex Jaime, PharmD       "

## 2024-03-28 NOTE — PROGRESS NOTES
"3/27 @ los 41 returned to [facility] for \" requiring escalation to mechanical circulatory support today with re-initiation of [facility] ECMO.\" Supported by Impella 5.5. ESRD & on dialysis.  Committee Board following for another heart transplant/ +kidney.    Claribel Diaz (LSW, MSW)     Claribel Diaz (HANNAHW, MSW)          "
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
## 2024-03-28 NOTE — OP NOTE
Placement Extracorporeal Membrane Oxygenation Operative Note     Date: 3/27/2024  OR Location: [facility] OR    Name: Charla Bowles, : 1990, Age: 34 y.o., MRN: [id number], Sex: female    Diagnosis  Pre-op Diagnosis     * Cardiogenic shock (CMS/HCC) [R57.0] Post-op Diagnosis     * Cardiogenic shock (CMS/HCC) [R57.0]     Procedures  Placement Extracorporeal Membrane Oxygenation  60230 - DE ECMO/ECLS INITIATION VENO-ARTERIAL  Percutaneous insertion of femoral-femoral VA ECMO on right groin  2.   Insertion of antegrade 6 [place]bourgish sheath on common femoral artery for distal limb perfusion  3.  Attempted reposition of right axillary Impella.    Surgeons   Ruben Booker MD    Resident/Fellow/Other Assistant:  UMA Bui MD  No residents available    Procedure Summary  Anesthesia: General  ASA: IV  Anesthesia Staff: Anesthesiologist: Arian Ha MD; Good Chavez MD; Yessenia Blackburn MD  CRNA: JIMMY Child-CRNA  Anesthesia Resident: Jose Henson MD  Estimated Blood Loss: 100mL  Intra-op Medications:   Administrations occurring from 1555 to 1925 on 24:   Medication Name Total Dose   heparin 1,000 unit/mL injection 10,000 Units   calcitriol (Rocaltrol) solution 0.5 mcg Cannot be calculated   darbepoetin floyd (Aranesp) injection 100 mcg Cannot be calculated   dextrose 50 % injection 25 g Cannot be calculated   diphenhydrAMINE (BENADryl) injection 25 mg Cannot be calculated   ferrous sulfate (325 mg ferrous sulfate) tablet 1 tablet Cannot be calculated   folic acid (Folvite) tablet 1 mg Cannot be calculated   glucagon (Glucagen) injection 1 mg Cannot be calculated   guaiFENesin (Mucinex) 12 hr tablet 600 mg Cannot be calculated   HYDROmorphone (Dilaudid) injection 0.2 mg Cannot be calculated   levothyroxine (Synthroid, Levoxyl) tablet 200 mcg Cannot be calculated   lidocaine 4 % patch 1 patch Cannot be calculated   lubricating eye drops ophthalmic solution 2 drop Cannot be  calculated   microfibrllar collagen (Hemostat) pad Cannot be calculated   multivitamin with minerals 1 tablet Cannot be calculated   mupirocin (Bactroban) 2 % ointment Cannot be calculated   mycophenolate (Myfortic) EC tablet 180 mg Cannot be calculated   norepinephrine (Levophed) 8 mg in dextrose 5% 250 mL (0.032 mg/mL) infusion (premix) 0 mg   nystatin (Mycostatin) 100,000 unit/mL suspension 500,000 Units Cannot be calculated   ondansetron (Zofran) injection 4 mg Cannot be calculated   oxyCODONE (Roxicodone) immediate release tablet 5 mg Cannot be calculated   pantoprazole (ProtoNix) EC tablet 40 mg Cannot be calculated   perflutren lipid microspheres (Definity) injection 0.5-10 mL of dilution Cannot be calculated   perflutren protein A microsphere (Optison) injection 0.5 mL Cannot be calculated   polyethylene glycol (Glycolax, Miralax) packet 17 g Cannot be calculated   predniSONE (Deltasone) tablet 10 mg Cannot be calculated   sodium chloride (Ocean) 0.65 % nasal spray 1 spray Cannot be calculated   sulfur hexafluoride microsphr (Lumason) injection 24.28 mg Cannot be calculated   tacrolimus (Prograf) capsule 1 mg Cannot be calculated   tacrolimus (Prograf) capsule 1 mg Cannot be calculated   valGANciclovir (Valcyte) tablet 450 mg Cannot be calculated   acetaminophen (Tylenol) tablet 975 mg Cannot be calculated   benzocaine-menthol (Cepastat Sore Throat) 15-3.6 mg lozenge 1 lozenge Cannot be calculated   EPINEPHrine (Adrenalin) 4 mg in dextrose 5% 250 mL (16 mcg/mL) infusion (premix) 0.5 mg   insulin lispro (HumaLOG) injection 0-10 Units Cannot be calculated   melatonin tablet 10 mg Cannot be calculated   sennosides-docusate sodium (Patti-Colace) 8.6-50 mg per tablet 1 tablet Cannot be calculated              Anesthesia Record               Intraprocedure I/O Totals          Intake    Transfuse RBC, Leukocytes Reduced (CMV reduced risk) 350.00 mL    Norepinephrine Drip 0.00 mL    The total shown is the total  volume documented since Anesthesia Start was filed.    DOBUTamine Drip 0.00 mL    The total shown is the total volume documented since Anesthesia Start was filed.    Epinephrine Drip 0.00 mL    The total shown is the total volume documented since Anesthesia Start was filed.    Propofol Drip 0.00 mL    The total shown is the total volume documented since Anesthesia Start was filed.    Total Intake 350 mL       Output    Urine 20 mL    Est. Blood Loss 20 mL    Total Output 40 mL       Net    Net Volume 310 mL          Specimen: No specimens collected     Staff:   Circulator: Kristen Bender RN  Scrub Person: Isabell Fitzgerald RN         Drains and/or Catheters:   Urethral Catheter Double-lumen;Non-latex;Temperature probe 14 Fr. (Active)       Findings:   Impella disposition towards mitral valve  Severe biventricular dysfunction  Severe tricuspid regurgitation    Indications: Charla Bowles is an 34 y.o. female who is having surgery for Cardiogenic shock (CMS/[facility]) [R57.0].  She is well-known by our heart failure/transplant team.  Her history is quite complex.  (See notes).  Today I was contacted by the heart failure team to reinsert ECMO due to deterioration of current clinical status and need of escalation of support.  The case was discussed among the heart failure team including Dr. rBight.  I had the pleasure of seeing this patient in his room with her parents.  Unfortunately she was quite sick and needed in the emergency ECMO insertion.    I obtained consent from his mother who was at bedside.  They are well aware of the risks involved given this patient had already been on ECMO before.     Preoperative antibiotics have been ordered and given within 1 hours of incision. Venous thrombosis prophylaxis are not indicated.    Procedure Details: The patient was brought emergently to the OR 21.  A brief safety huddle was performed.  The patient was put to sleep under general anesthetic.  The groin was prepped and  draped in the usual fashion.  Antegrade and retrograde common femoral artery puncture was performed and guidewires advanced.  Next, under ultrasound guidance, the right femoral vein was also puncture and a guidewire was inserted.  Seldinger technique was used with progressive dilation of the vessels to insert a #17 femoral arterial cannula and a #25 femoral venous cannula.  The position was verified by transesophageal echocardiogram.  A #6 long femoral sheath was inserted antegradely on the common femoral artery for limb reperfusion.  I small bridge from the arterial cannula was created to connect to the 6 Japanese sheath allowing reperfusion of the limb.  The ECMO machine went quite smoothly with no issues.      We also tried to reposition the Impella assist device which was backwardly facing the mitral valve.  We attempted quite nicely we got better position but we were not able to best position the pump.    Everything was secured, and the patient was transferred to intensive care unit in a stable condition.    The family was updated.    Complications:  None; patient tolerated the procedure well.    Disposition: ICU - intubated and hemodynamically stable.  Condition: stable         Additional Details: No residents were available.  Dr. Bui performed the procedure under my surveillance.  I was present, scrubbed and assisted him during the performance of the procedure.    Attending Attestation: I was present and scrubbed for the entire procedure.    Ruben Booker  Phone Number: [phone]

## 2024-03-28 NOTE — PROGRESS NOTES
"Charla Bowles is a 34 y.o. female on day 42 of admission presenting with Cardiogenic shock (CMS/HCC). Decompensation while convalescing on Impella 5 and returned to VA-ECMO.      Physical Exam    Last Recorded Vitals  Blood pressure (!) 112/105, pulse 87, temperature 36.5 °C (97.7 °F), resp. rate (!) 39, height 1.549 m (5' 0.98\"), weight 96 kg (211 lb 10.3 oz), SpO2 (!) 89 %.  Intake/Output last 3 Shifts:  I/O last 3 completed shifts:  In: 3439.9 (35.8 mL/kg) [I.V.:2139.9 (22.3 mL/kg); Blood:1300]  Out: 1598 (16.6 mL/kg) [Urine:30 (0 mL/kg/hr); Other:1548; Blood:20]  Weight: 96 kg       Assessment/Plan   Principal Problem:    Cardiogenic shock (CMS/HCC)  Active Problems:    Heart transplant recipient (CMS/HCC)    ESRD (end stage renal disease) on dialysis (CMS/HCC)    HIRAM (acute kidney injury) (CMS/HCC)    Acute passive congestion of liver    Hyponatremia    Iron deficiency anemia    Abdominal pain    Systolic congestive heart failure (CMS/HCC)    Cardiac transplant rejection (CMS/HCC)    Acute combined systolic (congestive) and diastolic (congestive) heart failure (CMS/HCC)    1) Cardiogenic Shock, Hx of heart transplant and rejection   Maintain full flow VA-ECMO and Impella for offloading. Re-initiate CRRT. Reviewed with heart transplant team. Neph Tx consult. SAT/SBT->WTE. Palliative engaged.       Due to the high probability of life threatening clinical decompensation, the patient required critical care time evaluating and managing this patient.  Critical care time included obtaining a history, examining the patient, ordering and reviewing studies, discussing, developing, and implementing a management plan, evaluating the patient's response to treatment, and discussion with other care team providers. I saw and evaluated the patient myself. I reviewed the provider's documentation and discussed the patient with the provider. Critical care time was performed exclusive of billable procedures.    Critical " Care Time: 45 minutes       Quang Mehta MD

## 2024-03-28 NOTE — PROGRESS NOTES
CTICU Progress Note  Charla Bowles/[id number]    Admit Date: 2/15/2024  Hospital Length of Stay: 42   ICU Length of Stay: 21h   CT SURGEON:     SUBJECTIVE:   See overnight events from OR s/p decannulation   1 pRBC     MEDICATIONS  Infusions:  dexmedeTOMIDine, Last Rate: 0.3 mcg/kg/hr (03/28/24 1430)  heparin, Last Rate: 1,200 Units/hr (03/28/24 1621)  heparin Impella Purge 25 units/mL in 500 mL D5W, Last Rate: 10 mL/hr (03/28/24 1400)  lactated Ringer's, Last Rate: 5 mL/hr (03/28/24 1400)  PrismaSol 4/2.5      Scheduled:  [Held by provider] acetaminophen, 650 mg, q4h  [Held by provider] aspirin, 81 mg, Daily  calcitriol, 0.5 mcg, Daily  cephalexin, 500 mg, q12h TRICIA  [Held by provider] cyclobenzaprine, 5 mg, BID  darbepoetin floyd, 100 mcg, q14 days  ferrous sulfate (325 mg ferrous sulfate), 65 mg of iron, Daily with breakfast  folic acid, 1 mg, Daily  heparin, ,   insulin lispro, 0-15 Units, q4h  levothyroxine, 200 mcg, Daily  lidocaine, 1 patch, Daily  multivitamin with minerals, 1 tablet, Daily  mycophenolate, 180 mg, BID  nystatin, 5 mL, q6h  oxygen, , Continuous - Inhalation  pantoprazole, 40 mg, BID  perflutren protein A microsphere, 0.5 mL, Once in imaging  polyethylene glycol, 17 g, BID  potassium, sodium phosphates, 1 packet, 4x daily  predniSONE, 10 mg, Daily  [Held by provider] rosuvastatin, 40 mg, Nightly  sennosides-docusate sodium, 2 tablet, BID  [Held by provider] sulfamethoxazole-trimethoprim, 80 mg of trimethoprim, Daily  sulfur hexafluoride microsphr, 2 mL, Once in imaging  tacrolimus, 0.5 mg, q12h TRICIA  [Held by provider] traZODone, 25 mg, Nightly  valGANciclovir, 450 mg, q48h      PRN:  calcium gluconate, 1 g, q6h PRN  calcium gluconate, 2 g, q6h PRN  dextrose, 25 g, q15 min PRN  dextrose, 25 g, q15 min PRN   Or  glucagon, 1 mg, q15 min PRN  dextrose, 25 g, q15 min PRN   Or  glucagon, 1 mg, q15 min PRN  diphenhydrAMINE, 25 mg, q5 min PRN  glucagon, 1 mg, q15 min PRN  heparin, ,  "  HYDROmorphone, 0.2 mg, q4h PRN  artificial tears, 2 drop, PRN  magnesium sulfate, 2 g, q6h PRN  magnesium sulfate, 4 g, q6h PRN  microfibrllar collagen, , PRN  mupirocin, , BID PRN  naloxone, 0.2 mg, q5 min PRN  ondansetron, 4 mg, q4h PRN  oxyCODONE, 5 mg, q4h PRN  sodium chloride, 1 spray, 4x daily PRN        PHYSICAL EXAM:   Visit Vitals  BP (!) 112/105   Pulse 85   Temp 36.5 °C (97.7 °F)   Resp (!) 34   Ht 1.549 m (5' 0.98\")   Wt 96 kg (211 lb 10.3 oz)   SpO2 94%   BMI 40.01 kg/m²   OB Status Hysterectomy   Smoking Status Never   BSA 2.03 m²     Wt Readings from Last 5 Encounters:   03/26/24 96 kg (211 lb 10.3 oz)   12/07/23 92.1 kg (203 lb)   12/01/23 93 kg (205 lb)   11/29/23 92.9 kg (204 lb 12.8 oz)   11/09/23 91.3 kg (201 lb 3.2 oz)     INTAKE/OUTPUT:  I/O last 3 completed shifts:  In: 3439.9 (35.8 mL/kg) [I.V.:2139.9 (22.3 mL/kg); Blood:1300]  Out: 1598 (16.6 mL/kg) [Urine:30 (0 mL/kg/hr); Other:1548; Blood:20]  Weight: 96 kg          Vent settings:  Vent Mode: CPAP  FiO2 (%):  [40 %-100 %] 40 %  S RR:  [10] 10  S VT:  [300 mL] 300 mL  PEEP/CPAP (cm H2O):  [5 cm H20-8 cm H20] 5 cm H20  MS SUP:  [5 cm H20-8 cm H20] 8 cm H20  MAP (cm H2O):  [8-18] 8    Physical Exam:   - CONSTITUTION: young appearing female intubated on ECMO with impella  - NEUROLOGIC: sedated but following commands  - CARDIOVASCULAR: NSR to sinus tachy. R fem VA ECMO, no bleeding at site. Unable to find signals in LLE. R axillary impella without bleeding  - RESPIRATORY: Intubated.  Course breath sounds. Thin, pink tinged secretions.   - GI: soft, hypoactive BS  - : anuric  - EXTREMITIES: cooler LE but equally cool bilaterally. UE warm and well perfused  - SKIN: left groin area of breakdown  - PSYCHIATRIC: JOSE    Daily Risk Screen  Intubated:plan to attempt to wean to extubate  Central line: evaluating potential options to replace but needed for vasoactive and CVVH  Singh: dc    Images:     Invasive Hemodynamics:    Most Recent Range Past " 24hrs   BP (Art) 110/103 Arterial Line BP 1  Min: 98/91  Max: 115/106   MAP(Art) 106 mmHg Arterial Line MAP 1 (mmHg)   Min: 87 mmHg  Max: 109 mmHg   RA/CVP   No data recorded   PA 41/34 No data recorded   PA(mean) 37 mmHg No data recorded   CO 5.5 L/min No data recorded   CI 2.8 L/min/m2 No data recorded   Mixed Venous 51 % No data recorded   SVR  1115 (dyne*sec)/cm5 No data recorded         Assessment/Plan   34F with a PMHx sig for stage D HFrEF (PPCM) s/p ICD s/p CardioMEMs, hypothyroidism 2/2 thyroidectomy, obesity s/p gastric bypass (2017), and SLE who is s/p OHT (3/31/2022) with a post-OHT course complicated by RIJ/RUE DVTs, leukopenia, left groin seroma, and asymptomatic 2R ACR (11/2022) treated with steroids, s/p total hysterectomy (2023), Flu A (1/2024).     Originally presented to [facility] ED on 2/15/24 with complaints of N/V x 3 days and inability to keep medications down. Found to have acute systolic heart failure with primary graft failure and cardiogenic shock. Treated for suspected acute cellular vs. acute antibody mediated rejection; however, multiple cardiac biopsies negative for signs of rejection or other causes of graft failure. Course has been complicated by multiple MCS devices for refractory cardiogenic shock (right femoral IABP: 2/18/24 - 3/1/24, Left femoral VA ECMO: 2/19/24 - 2/29/24, Right axillary impella 5.5: 3/1/24 - remains in place, 2nd right femoral VA ECMO: 3/27/24 - remains in place), ARF requiring RRT, acute liver injury, intubation due to volume overload in setting of pulmonary edema, severe hemolysis 2/2 to impella malposition, mild CMV viremia, bilateral provoked IJ DVTs, multiple episodes of epistaxis & coagulopathies requiring ongoing blood product transfusions.        Transferred to CTICU on 3/27/24 following right femoral VA ECMO placement due to worsening cardiogenic shock and multi-organ failure despite Impella 5.5 support and multiple inotropes.       Plan:  NEURO: No  history. Previously neuro intact with acute pain and intermittent insomnia. Alerted mental status in setting of cardiogenic shock 3/27. Currently intubated and sedated on propofol infusion but follows commands with SAT. -->  - Serial neuro and pain assessments  - transition propofol to precedex  - hold tylenol with liver dysfunction  - continue lidoderm patch for back pain  - Continue oxy to 5mg Q4 PRN.   - continue dilaudid prn for now while intubated  - holding trazodone for sleep while on precedex  - PT/OT Consult  - CAM ICU score qshift. Sleep/wake cycle hygiene     ENT: Multiple episodes of epistaxis with interventions by ENT.  Most recently in OR 3/27 with L nare packed.  No current bleeding.  - appreciate ENT recs  - keflex while packing in place  - Prn ocean spray and mupiricin for dry nasal passages     Cardiac: Patient has a history of heart transplant in March of 2022 with primary graft dysfunction treated for acute cellular and antibody rejection without improvement with negative biopsy with multiple MCS devices for refractory cardiogenic shock (right femoral IABP: 2/18/24 - 3/1/24, Left femoral VA ECMO: 2/19/24 - 2/29/24, Right axillary impella 5.5: 3/1/24 - remains in place, 2nd right femoral VA ECMO: 3/27/24 - remains in place). Elevated LDH with multiple attempts at repositioning impella. On dobutamine 5 mcg/kg/min and epi 0.04 mcg/kg/min, ECMO ~3.5L flow/100%/sweep 1.5, impella 5.5 P6 with flows 1.6L with resolved lactate and improving end organ function. NSR and hypertensive.   -->  - Maintain goal MAP 70-90  - continue full BiV support with impella and ECMO. No plan for attempts at weaning. Will be presented within the next week for kidney/heart transplant  - wean off epi and dobutamine  - Continue to hold home rosuvastatin 40mg daily with elevated LFTs  - Hold home amlodipine    Vascular: C/f LLE ischemia (previous ECMO site) with loss of pulses. LE equally cool.   - appreciate vascular surgery  following  - trend CK  - KATHIE LLE     PULM: No history. Intubated multiple times throughout hospital course for procedures; intubated 3/27 for OR. Again with acute respiratory failure today due to acute pulmonary edema. Appropriate peak and plateau pressures on ACVC. ECMO sweep 1.5, FIO2 100% -->  - CXR daily  - pull negative on CVVH  - cpap trial today and assess for extubation  - Maintain SPO2 > 92%     GI: History of gastric bypass and MMF colitis. Shock liver originally resolved; most recently elevated r/t likely congestive hepatopathy that is improving on ECMO. Previously on oral diet. OG in place at present. Last BM 3/23 per documentation; intermittent constipation. Abdominal pain improved with reduced myfortic dosing. Previous c/f GI, likely blood from epistaxis; no current evidence of GI bleeding.  -->  - Continue calcitriol 0.5mg daily, multivitamin, calcium carbonate 1,250mg BID  - BID PPI for now. Will reduce to daily if no evidence of GI bleeding  - avoiding placing dobhoff with epistaxis  - will start TF if not proceeding toward extubation  - Resume miralax BID & senna-colace BID  - Follow up stool samples: H pylori (PENDING), fecal calprotectin (PENDING), fecal lactoferrin (Positive 03/23/24)     : No history, baseline Cr 1.2-1.3. HIRAM originally on CVVH then with renal recovery but then again worsened and now dialysis dependent and failed diuretic challenges. Hypervolemic again off CVVH since OR last night. -->  - RFP as clinically indicated, replete electrolytes per CTICU protocol.   - resume CVVH for net negative  - Nephrology consult     ENDO: History of hypothyroidism and prediabetes. TSH elevated originally to malabsorption then with dosing adjusted by endo; TSH (3/17): 20.74, T4 1.11, T3: 1.4. Steroid induced hyperglycemia acceptable glycemic control on SSI. -->  - Maintain BG <180 with hypoglycemia protocol  - Continue SSI  - Continue Synthroid to 200mcg daily.   - Endocrine following     HEME:  History of iron deficiency anemia and remote DVT; again with acute DVT LIJ and SVT left cephalic vein and right cephalic vein. Acute blood loss anemia with repeated transfusions improved off systemic AC. 1 pRBC overnight and incremented. Thrombocytopenia uptrended after 10pk plts yesterday; last PF4 negative 3/25.  Coagulopathy in setting of congestive hepatopathy improving with 2 FFP yesterday. C/f LLE ischemia. -->    - CBC BID today  - INR BID today  - Continue ferrous sulfate daily  - holding ASA for CAD prevention with recent thrombocytopenia  - will start systemic heparin infusion later today if INR downtrending  - continue heparin purge for impella  - SCDs and for DVT prophylaxis  - Aranesp every 2 weeks  - Last type and screen: 3/28     Rejection/prophylaxis: S/p heart transplant 3/31/2022. Induction basiliximab. Donor HCV -, toxo -/-, CMV -/+. Mild ACR with +CD4 and negative HLAs however clinical presentation concern for acute cellular vs AMR rejection/stuttering rejection completed courses of thymo/PLEX/IVIG without clinical improvement.   - Steroids: Continue 10mg PO prednisone daily  - Tacrolimus: 0.5mg liquid @ 0630 and 0.5mg liquid @ 1830 w/ daily levels drawn @ 0600  - Tacrolimus goal troughs: 8-10  - Myfortic acid: 180mg BID, will discuss changing to MMF if unable to extubate and take oral meds  - Antifungals: nystatin 500,000units Q6  - Antivirals: valcyte 450mg Q48hrs   - Anti PCP & Toxoplasmosis: holding Bactrim SS daily due to thrombocytopenia     ID: Afebrile. C/f previous yeast infection. BC 3/27 NGTD.  -->  - Trend temp q4h  - send MRSA screen. Dc vanc for ECMO cannulation  - dc ancef for ECMO cannulation, completed course  - keflex while nasal packing in place  - f/u blood cx     Skin: No active skin issues. Left groin wound from previous ecmo with wound consulted. Wound cx with NG 3/15.   - Preventative Mepilex dressings in place on sacrum and heels  - Change preventative Mepilex weekly or  more frequently as indicated (when moist/soiled)   - Every shift skin assessment per nursing and weekly ICU skin rounds  - Moisture barrier to be applied with christopher care  - Active skin problems addressed with nursing on daily rounds  - wound recs from note 3/15 ordered      Proph:  SCDs  heparin  PPI     G: Lines  RIJ Trialysis - 3/5. Will evaluate replacing. Limited options for replacement with DVTs.  Will consider line holiday if off pressors and tolerating CVVH through ECMO circuit     Restraints: The indications and risks/benefits of non-violent/non self-destructive restraints were discussed and are indicated for the safety of the patient.     Code status: Full Code     I personally spent 45 minutes of critical care time directly and personally managing the patient exclusive of separately billable procedures.     A,B,C,D,E,F,G: reviewed     Discussed with Dr. Mehta  Dispo: CTICU care for now.    CTICU TEAM PHONE [id number]

## 2024-03-28 NOTE — CONSULTS
"Reason For Consult  Evaluation for kidney transplant    History Of Present Illness  Charla Bowles is a 34 y.o. 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 She is currently admitted to the CT ICU secondary to acute systolic heart failure with graft failure and cardiogenic shock ultimately requiring her to be placed on VA ECMO with concomitant respiratory and renal failure. Patient is being considered for second heart transplant. Transplant was consulted for evaluation for possible renal transplant.    Past Medical History  Per [facility] [place]    Surgical History  D and C   Heart Transplant   Tubal Ligation   Thyroidectomy  Gastric bypass  Tonsillectomy    Social History  She reports that she has never smoked. She has never used smokeless tobacco. No history on file for alcohol use and drug use.    Family History  No family history on file.     Allergies  Dapagliflozin, Empagliflozin, Myfortic [mycophenolate sodium], Topiramate, and Latex    Review of Systems  Unable to obtain given patient condition.      Physical Exam  GEN: lying in bed, sedated on precedex, NAD  HEAD: ET tube, NGT  RESP: Intubated, tolerating CPAP  CV: VA ECMO, cannulated in right groin  ABD: soft, mildly distended, nontender   : CVVH  NEURO: sedated (per nurse was following earlier in the day but had just gotten pain meds)    Last Recorded Vitals  Blood pressure (!) 112/105, pulse 83, temperature 36.5 °C (97.7 °F), resp. rate (!) 44, height 1.549 m (5' 0.98\"), weight 96 kg (211 lb 10.3 oz), SpO2 (!) 88 %.    Relevant Results  Most recent RFP: Cr 1.38, BUN 20, electrolytes appropriate      Assessment/Plan   Charla Bowles is a 34 y.o. female with PMHx of hypothyroidism 2/2 thyroidectomy, obesity s/p gastric bypass, SLE and complex cardiac history, including heart transplant in March of " 2022 for cardiomyopathy secondary to pregnancy. She is currently admitted to the CT ICU secondary to acute systolic heart failure with graft failure and cardiogenic shock ultimately requiring her to be placed on VA ECMO with concomitant respiratory and renal failure. Patient is being considered for second heart transplant. Transplant was consulted for evaluation for possible renal transplant.    Recommendations:  Abdominal transplant surgery aware of patient's case. She will be discussed at the appropriate multidisciplinary meeting. Additional workup as needed will be provided to the critical care team as appropriate.     Patient discussed with attending Dr. Castellanos.    Haylie Michelle, PGY2  General Surgery

## 2024-03-28 NOTE — PROGRESS NOTES
"Charla Bowles is a 34 y.o. female on day 41 of admission presenting with Cardiogenic shock (CMS/HCC) requiring escalation to mechanical circulatory support today with re-initiation of VA ECMO.     ECMO Daily Note  ECMO Type: Veno-arterial (V-A)        Primary Indication: cardiogenic shock    ECMO settings  Flow: 3.5-3.7 liters/minute    Circuit Flow (RPM): 3165  Sweep Gas Flow Rate (L/min): 1.5  FiO2 (%): 100 %  Day: 0    Ventilatory Support  Vent Mode: Assist control/Volume control plus  Vt (Set, mL): 300 mL  FiO2 (%): (S) 50 %  PEEP/CPAP (cm H2O): 8 cm H20  Resp Rate (Set): 10    Patient requires ECMO support. Drainage cannula site, cannula, pump, oxygenator, return cannula and return cannula site clinically inspected. RPM and sweep reviewed and adjusted appropriate to clinical context. Anticoagulation status and intensity discussed. Discussed with cardiothoracic surgeon, perfusion, bedside nurse.     ECMO Indication: cardiogenic shock  ECMO Cannula Configuration: right femoral-femoral cannulation   ECMO circuit run from drainage cannula to pump to oxygenator to return cannula: yes  Presence of cannula bleeding: minimal oozing at insertion site   Presence of oxygenator clot: none observed  Pre/post PaO2 adequate: yes  LV Unloading/Pulse Pressure: narrow pulse pressure, Impella in situ   Distal Perfusion: yes, antegrade sheath to common femoral artery; DP   Anticoagulation Plan/Monitoring: flows adequate, oozing at groin, INR supratherapeutic--holding for now, will continue to reassess   Mobility Plan/Candidacy: Intubated and sedated, not a candidate at this time.   Path to decannulation/anticipated decannulation date: Unclear    Last Recorded Vitals  Blood pressure (!) 155/128, pulse 105, temperature 36.8 °C (98.2 °F), temperature source Bladder, resp. rate 19, height 1.549 m (5' 0.98\"), weight 96 kg (211 lb 10.3 oz), SpO2 100 %.  Intake/Output last 3 Shifts:  I/O last 3 completed shifts:  In: 1832.4 (19.1 " mL/kg) [I.V.:882.4 (9.2 mL/kg); Blood:950]  Out: 2743 (28.6 mL/kg) [Urine:20 (0 mL/kg/hr); Other:2703; Blood:20]  Weight: 96 kg       Assessment/Plan   Principal Problem:    Cardiogenic shock (CMS/HCC)  Active Problems:    Heart transplant recipient (CMS/HCC)    ESRD (end stage renal disease) on dialysis (CMS/HCC)    HIRAM (acute kidney injury) (CMS/HCC)    Acute passive congestion of liver    Hyponatremia    Iron deficiency anemia    Abdominal pain    Systolic congestive heart failure (CMS/HCC)    Cardiac transplant rejection (CMS/HCC)    Acute combined systolic (congestive) and diastolic (congestive) heart failure (CMS/HCC)    I spent 55 minutes in the professional and overall care of this patient.      Haylie Holguin, DO

## 2024-03-28 NOTE — PROGRESS NOTES
Vancomycin Dosing by Pharmacy- Cessation of Therapy    Consult to pharmacy for vancomycin dosing has been discontinued by the prescriber, pharmacy will sign off at this time.    Please call pharmacy if there are further questions or re-enter a consult if vancomycin is resumed.     Omar Layne, PharmD

## 2024-03-29 NOTE — PROGRESS NOTES
Charla Bowles is a 34 y.o. female on day 43 of admission presenting with Cardiogenic shock (CMS/HCC).    Subjective   Patient remains on VA ECMO and Impella. Overnight, heparin supratherapetuic and patient developed thrombocytopenia. Heparin gtt off this morning. Patient remains on Precedex gtt, she is drowsy but interactive and following commands. Inotropes weaned off. Patient approved for kidney transplant pending heart approval, awaiting committee decision for heart transplant.     Objective     Physical Exam  Constitutional:       Appearance: She is ill-appearing.   HENT:      Head: Normocephalic.      Comments: Left nare packed with muricel. Not actively bleeding      Mouth/Throat:      Mouth: Mucous membranes are dry.      Pharynx: Oropharynx is clear. No oropharyngeal exudate or posterior oropharyngeal erythema.   Eyes:      Extraocular Movements: Extraocular movements intact.      Conjunctiva/sclera: Conjunctivae normal.      Pupils: Pupils are equal, round, and reactive to light.   Neck:      Vascular: No JVD.   Cardiovascular:      Rate and Rhythm: Tachycardia present.      Comments: Right axillary impella in place ~45cm at hub (no hematoma), Right femoral VA ECMO in place with reperfusion catheter (site appears clean and dry). Dopplerable radial and ulnar pulses bilaterally. Unable to doppler LLE DP/PT/popliteal pulses.  Lower extremities warm.   Pulmonary:      Effort: Pulmonary effort is normal. No accessory muscle usage, respiratory distress or retractions.      Breath sounds: No wheezing, rhonchi or rales.      Comments: Intubated via ETT. Chest wall moving symmetrically, minimal inline secretions.    Abdominal:      General: Abdomen is flat. Bowel sounds are normal. There is no distension.      Palpations: Abdomen is soft. There is no mass.      Hernia: No hernia is present.      Comments: OG in place   Musculoskeletal:         General: No swelling or tenderness. Normal range of motion.       "Cervical back: Full passive range of motion without pain.   Skin:     General: Skin is dry.      Capillary Refill: Capillary refill takes less than 2 seconds.      Coloration: Skin is not jaundiced.      Findings: Bruising and lesion present. No erythema, laceration, rash or wound.   Neurological:      General: No focal deficit present.      Mental Status: She is alert.      Comments: Sedated on Precedex. Drowsy but able to communicate and answer yes/no questions   Fcx4   [id number]          Last Recorded Vitals  Blood pressure (!) 112/105, pulse 79, temperature 37 °C (98.6 °F), temperature source Temporal, resp. rate 10, height 1.549 m (5' 0.98\"), weight 96 kg (211 lb 10.3 oz), SpO2 100 %.  Intake/Output last 3 Shifts:  I/O last 3 completed shifts:  In: 2970.6 (30.9 mL/kg) [I.V.:2360.6 (24.6 mL/kg); Blood:350; NG/GT:210; IV Piggyback:50]  Out: 4022 (41.9 mL/kg) [Urine:10 (0 mL/kg/hr); Other:4012]  Weight: 96 kg     Relevant Results  Scheduled medications  [Held by provider] acetaminophen, 650 mg, oral, q4h  calcitriol, 0.5 mcg, oral, Daily  calcium carbonate-vitamin D3, 1 tablet, oral, Daily  cephalexin, 500 mg, oral, q12h TRICIA  cyanocobalamin, 500 mcg, oral, Daily  darbepoetin floyd, 100 mcg, subcutaneous, q14 days  ferrous sulfate (325 mg ferrous sulfate), 65 mg of iron, oral, Daily with breakfast  folic acid, 1 mg, oral, Daily  insulin lispro, 0-15 Units, subcutaneous, q4h  levothyroxine, 200 mcg, oral, Daily  lidocaine, 1 patch, transdermal, Daily  multivitamin with iron-minerals, 15 mL, oral, Daily  multivitamin with minerals, 1 tablet, oral, Daily  [Held by provider] mycophenolate, 180 mg, oral, BID  nystatin, 5 mL, Swish & Swallow, q6h  oxygen, , inhalation, Continuous - Inhalation  pantoprazole, 40 mg, intravenous, BID  perflutren lipid microspheres, 0.5-10 mL of dilution, intravenous, Once in imaging  perflutren protein A microsphere, 0.5 mL, intravenous, Once in imaging  perflutren protein A microsphere, 0.5 mL, " intravenous, Once in imaging  polyethylene glycol, 17 g, oral, BID  predniSONE, 10 mg, oral, Daily  sennosides-docusate sodium, 2 tablet, oral, BID  [Held by provider] sulfamethoxazole-trimethoprim, 80 mg of trimethoprim, oral, Daily  sulfur hexafluoride microsphr, 2 mL, intravenous, Once in imaging  sulfur hexafluoride microsphr, 2 mL, intravenous, Once in imaging  tacrolimus, 0.5 mg, oral, q12h TRICIA  [Held by provider] traZODone, 25 mg, oral, Nightly  valGANciclovir, 450 mg, oral, q48h      Continuous medications  dexmedeTOMIDine, 0.1-1.5 mcg/kg/hr, Last Rate: 0.2 mcg/kg/hr (03/29/24 0900)  heparin, 0-4,000 Units/hr, Last Rate: Stopped (03/29/24 0700)  heparin Impella Purge 25 units/mL in 500 mL D5W, 10 mL/hr, Last Rate: 10 mL/hr (03/29/24 0900)  lactated Ringer's, 5 mL/hr, Last Rate: 5 mL/hr (03/29/24 0900)  PrismaSol 4/2.5, 25 mL/kg/hr      PRN medications  PRN medications: calcium gluconate, calcium gluconate, dextrose, dextrose **OR** glucagon, dextrose **OR** glucagon, diphenhydrAMINE, glucagon, HYDROmorphone, artificial tears, magnesium sulfate, magnesium sulfate, microfibrllar collagen, mupirocin, naloxone, ondansetron, oxyCODONE, sodium chloride     Results for orders placed or performed during the hospital encounter of 02/15/24 (from the past 24 hour(s))   Type and Screen   Result Value Ref Range    ABO TYPE O     Rh TYPE POS     ANTIBODY SCREEN NEG    Calcium, Ionized   Result Value Ref Range    POCT Calcium, Ionized 1.08 (L) 1.1 - 1.33 mmol/L   Magnesium   Result Value Ref Range    Magnesium 2.42 (H) 1.60 - 2.40 mg/dL   Coagulation Screen   Result Value Ref Range    Protime 20.6 (H) 9.8 - 12.8 seconds    INR 1.8 (H) 0.9 - 1.1    aPTT 41 (H) 27 - 38 seconds   Fibrinogen   Result Value Ref Range    Fibrinogen 154 (L) 200 - 400 mg/dL   CBC   Result Value Ref Range    WBC 8.7 4.4 - 11.3 x10*3/uL    nRBC 17.6 (H) 0.0 - 0.0 /100 WBCs    RBC 2.81 (L) 4.00 - 5.20 x10*6/uL    Hemoglobin 8.5 (L) 12.0 - 16.0 g/dL     Hematocrit 24.5 (L) 36.0 - 46.0 %    MCV 87 80 - 100 fL    MCH 30.2 26.0 - 34.0 pg    MCHC 34.7 32.0 - 36.0 g/dL    RDW 21.0 (H) 11.5 - 14.5 %    Platelets 111 (L) 150 - 450 x10*3/uL   Renal Function Panel   Result Value Ref Range    Glucose 130 (H) 74 - 99 mg/dL    Sodium 132 (L) 136 - 145 mmol/L    Potassium 4.5 3.5 - 5.3 mmol/L    Chloride 101 98 - 107 mmol/L    Bicarbonate 25 21 - 32 mmol/L    Anion Gap 11 10 - 20 mmol/L    Urea Nitrogen 21 6 - 23 mg/dL    Creatinine 1.65 (H) 0.50 - 1.05 mg/dL    eGFR 42 (L) >60 mL/min/1.73m*2    Calcium 7.5 (L) 8.6 - 10.6 mg/dL    Phosphorus 2.4 (L) 2.5 - 4.9 mg/dL    Albumin 2.7 (L) 3.4 - 5.0 g/dL   Creatine Kinase   Result Value Ref Range    Creatine Kinase 71 0 - 215 U/L   MRSA Surveillance for Vancomycin De-escalation, PCR    Specimen: Anterior Nares; Swab   Result Value Ref Range    MRSA PCR Not Detected Not Detected   Blood Gas Arterial Full Panel   Result Value Ref Range    POCT pH, Arterial 7.38 7.38 - 7.42 pH    POCT pCO2, Arterial 39 38 - 42 mm Hg    POCT pO2, Arterial 446 (H) 85 - 95 mm Hg    POCT SO2, Arterial 100 94 - 100 %    POCT Oxy Hemoglobin, Arterial 95.8 94.0 - 98.0 %    POCT Hematocrit Calculated, Arterial 26.0 (L) 36.0 - 46.0 %    POCT Sodium, Arterial 128 (L) 136 - 145 mmol/L    POCT Potassium, Arterial 4.7 3.5 - 5.3 mmol/L    POCT Chloride, Arterial 100 98 - 107 mmol/L    POCT Ionized Calcium, Arterial 1.13 1.10 - 1.33 mmol/L    POCT Glucose, Arterial 138 (H) 74 - 99 mg/dL    POCT Lactate, Arterial 0.7 0.4 - 2.0 mmol/L    POCT Base Excess, Arterial -1.8 -2.0 - 3.0 mmol/L    POCT HCO3 Calculated, Arterial 23.1 22.0 - 26.0 mmol/L    POCT Hemoglobin, Arterial 8.8 (L) 12.0 - 16.0 g/dL    POCT Anion Gap, Arterial 10 10 - 25 mmo/L    Patient Temperature 37.0 degrees Celsius    FiO2 50 %   Blood Gas Arterial Full Panel   Result Value Ref Range    POCT pH, Arterial 7.38 7.38 - 7.42 pH    POCT pCO2, Arterial 37 (L) 38 - 42 mm Hg    POCT pO2, Arterial 408 (H)  85 - 95 mm Hg    POCT SO2, Arterial 100 94 - 100 %    POCT Oxy Hemoglobin, Arterial 95.9 94.0 - 98.0 %    POCT Hematocrit Calculated, Arterial 26.0 (L) 36.0 - 46.0 %    POCT Sodium, Arterial 129 (L) 136 - 145 mmol/L    POCT Potassium, Arterial 4.2 3.5 - 5.3 mmol/L    POCT Chloride, Arterial 103 98 - 107 mmol/L    POCT Ionized Calcium, Arterial      POCT Glucose, Arterial 123 (H) 74 - 99 mg/dL    POCT Lactate, Arterial 0.6 0.4 - 2.0 mmol/L    POCT Base Excess, Arterial -2.9 (L) -2.0 - 3.0 mmol/L    POCT HCO3 Calculated, Arterial 21.9 (L) 22.0 - 26.0 mmol/L    POCT Hemoglobin, Arterial 8.5 (L) 12.0 - 16.0 g/dL    POCT Anion Gap, Arterial 8 (L) 10 - 25 mmo/L    Patient Temperature 37.0 degrees Celsius    FiO2 40 %   Blood Gas Arterial Full Panel   Result Value Ref Range    POCT pH, Arterial 7.36 (L) 7.38 - 7.42 pH    POCT pCO2, Arterial 42 38 - 42 mm Hg    POCT pO2, Arterial 396 (H) 85 - 95 mm Hg    POCT SO2, Arterial 100 94 - 100 %    POCT Oxy Hemoglobin, Arterial 95.2 94.0 - 98.0 %    POCT Hematocrit Calculated, Arterial 27.0 (L) 36.0 - 46.0 %    POCT Sodium, Arterial 129 (L) 136 - 145 mmol/L    POCT Potassium, Arterial 4.5 3.5 - 5.3 mmol/L    POCT Chloride, Arterial 101 98 - 107 mmol/L    POCT Ionized Calcium, Arterial 1.11 1.10 - 1.33 mmol/L    POCT Glucose, Arterial 135 (H) 74 - 99 mg/dL    POCT Lactate, Arterial 0.6 0.4 - 2.0 mmol/L    POCT Base Excess, Arterial -1.7 -2.0 - 3.0 mmol/L    POCT HCO3 Calculated, Arterial 23.7 22.0 - 26.0 mmol/L    POCT Hemoglobin, Arterial 8.9 (L) 12.0 - 16.0 g/dL    POCT Anion Gap, Arterial 9 (L) 10 - 25 mmo/L    Patient Temperature 37.0 degrees Celsius    FiO2 40 %   Blood Gas Arterial Full Panel   Result Value Ref Range    POCT pH, Arterial 7.34 (L) 7.38 - 7.42 pH    POCT pCO2, Arterial 41 38 - 42 mm Hg    POCT pO2, Arterial 383 (H) 85 - 95 mm Hg    POCT SO2, Arterial 100 94 - 100 %    POCT Oxy Hemoglobin, Arterial 95.7 94.0 - 98.0 %    POCT Hematocrit Calculated, Arterial 25.0  (L) 36.0 - 46.0 %    POCT Sodium, Arterial 132 (L) 136 - 145 mmol/L    POCT Potassium, Arterial 4.1 3.5 - 5.3 mmol/L    POCT Chloride, Arterial 104 98 - 107 mmol/L    POCT Ionized Calcium, Arterial 1.04 (L) 1.10 - 1.33 mmol/L    POCT Glucose, Arterial 120 (H) 74 - 99 mg/dL    POCT Lactate, Arterial 0.5 0.4 - 2.0 mmol/L    POCT Base Excess, Arterial -3.4 (L) -2.0 - 3.0 mmol/L    POCT HCO3 Calculated, Arterial 22.1 22.0 - 26.0 mmol/L    POCT Hemoglobin, Arterial 8.4 (L) 12.0 - 16.0 g/dL    POCT Anion Gap, Arterial 10 10 - 25 mmo/L    Patient Temperature 37.0 degrees Celsius    FiO2 40 %   Blood Gas Arterial Full Panel   Result Value Ref Range    POCT pH, Arterial 7.40 7.38 - 7.42 pH    POCT pCO2, Arterial 39 38 - 42 mm Hg    POCT pO2, Arterial 393 (H) 85 - 95 mm Hg    POCT SO2, Arterial 100 94 - 100 %    POCT Oxy Hemoglobin, Arterial 94.7 94.0 - 98.0 %    POCT Hematocrit Calculated, Arterial 27.0 (L) 36.0 - 46.0 %    POCT Sodium, Arterial 130 (L) 136 - 145 mmol/L    POCT Potassium, Arterial 4.8 3.5 - 5.3 mmol/L    POCT Chloride, Arterial 102 98 - 107 mmol/L    POCT Ionized Calcium, Arterial 1.10 1.10 - 1.33 mmol/L    POCT Glucose, Arterial 141 (H) 74 - 99 mg/dL    POCT Lactate, Arterial 0.7 0.4 - 2.0 mmol/L    POCT Base Excess, Arterial -0.5 -2.0 - 3.0 mmol/L    POCT HCO3 Calculated, Arterial 24.2 22.0 - 26.0 mmol/L    POCT Hemoglobin, Arterial 8.9 (L) 12.0 - 16.0 g/dL    POCT Anion Gap, Arterial 9 (L) 10 - 25 mmo/L    Patient Temperature 37.0 degrees Celsius    FiO2 40 %   Creatine Kinase   Result Value Ref Range    Creatine Kinase 85 0 - 215 U/L   Heparin Assay, UFH   Result Value Ref Range    Heparin Unfractionated 0.8 See Comment Below for Therapeutic Ranges IU/mL   POCT GLUCOSE   Result Value Ref Range    POCT Glucose 118 (H) 74 - 99 mg/dL   Blood Gas Arterial Full Panel   Result Value Ref Range    POCT pH, Arterial 7.39 7.38 - 7.42 pH    POCT pCO2, Arterial 39 38 - 42 mm Hg    POCT pO2, Arterial 367 (H) 85 - 95  mm Hg    POCT SO2, Arterial 100 94 - 100 %    POCT Oxy Hemoglobin, Arterial 95.3 94.0 - 98.0 %    POCT Hematocrit Calculated, Arterial 25.0 (L) 36.0 - 46.0 %    POCT Sodium, Arterial 132 (L) 136 - 145 mmol/L    POCT Potassium, Arterial 4.1 3.5 - 5.3 mmol/L    POCT Chloride, Arterial 102 98 - 107 mmol/L    POCT Ionized Calcium, Arterial 1.05 (L) 1.10 - 1.33 mmol/L    POCT Glucose, Arterial 133 (H) 74 - 99 mg/dL    POCT Lactate, Arterial 0.5 0.4 - 2.0 mmol/L    POCT Base Excess, Arterial -1.2 -2.0 - 3.0 mmol/L    POCT HCO3 Calculated, Arterial 23.6 22.0 - 26.0 mmol/L    POCT Hemoglobin, Arterial 8.3 (L) 12.0 - 16.0 g/dL    POCT Anion Gap, Arterial 11 10 - 25 mmo/L    Patient Temperature 37.0 degrees Celsius    FiO2 40 %   Heparin Assay, UFH   Result Value Ref Range    Heparin Unfractionated 1.0 See Comment Below for Therapeutic Ranges IU/mL   Reticulocytes   Result Value Ref Range    Retic % 5.3 (H) 0.5 - 2.0 %    Retic Absolute 0.138 (H) 0.018 - 0.083 x10*6/uL    Reticulocyte Hemoglobin 33 28 - 38 pg    Immature Retic fraction 35.0 (H) <=16.0 %   Haptoglobin   Result Value Ref Range    Haptoglobin <10 (L) 37 - 246 mg/dL   D-dimer, Non VTE   Result Value Ref Range    D-Dimer Non VTE, Quant (ng/mL FEU) 48,549 (H) <=500 ng/mL FEU   CBC and Auto Differential   Result Value Ref Range    WBC 7.9 4.4 - 11.3 x10*3/uL    nRBC 12.1 (H) 0.0 - 0.0 /100 WBCs    RBC 2.62 (L) 4.00 - 5.20 x10*6/uL    Hemoglobin 8.2 (L) 12.0 - 16.0 g/dL    Hematocrit 24.0 (L) 36.0 - 46.0 %    MCV 92 80 - 100 fL    MCH 31.3 26.0 - 34.0 pg    MCHC 34.2 32.0 - 36.0 g/dL    RDW 21.8 (H) 11.5 - 14.5 %    Platelets 39 (LL) 150 - 450 x10*3/uL    Neutrophils % 86.4 40.0 - 80.0 %    Immature Granulocytes %, Automated 6.2 (H) 0.0 - 0.9 %    Lymphocytes % 2.7 13.0 - 44.0 %    Monocytes % 3.8 2.0 - 10.0 %    Eosinophils % 0.8 0.0 - 6.0 %    Basophils % 0.1 0.0 - 2.0 %    Neutrophils Absolute 6.84 1.20 - 7.70 x10*3/uL    Immature Granulocytes Absolute, Automated  0.49 0.00 - 0.70 x10*3/uL    Lymphocytes Absolute 0.21 (L) 1.20 - 4.80 x10*3/uL    Monocytes Absolute 0.30 0.10 - 1.00 x10*3/uL    Eosinophils Absolute 0.06 0.00 - 0.70 x10*3/uL    Basophils Absolute 0.01 0.00 - 0.10 x10*3/uL   Lactate Dehydrogenase   Result Value Ref Range    LDH 2,276 (H) 84 - 246 U/L   Creatine Kinase   Result Value Ref Range    Creatine Kinase 86 0 - 215 U/L   Magnesium   Result Value Ref Range    Magnesium 2.44 (H) 1.60 - 2.40 mg/dL   Hepatic function panel   Result Value Ref Range    Albumin 2.9 (L) 3.4 - 5.0 g/dL    Bilirubin, Total 4.3 (H) 0.0 - 1.2 mg/dL    Bilirubin, Direct 1.5 (H) 0.0 - 0.3 mg/dL    Alkaline Phosphatase 150 (H) 33 - 110 U/L     (H) 7 - 45 U/L     (H) 9 - 39 U/L    Total Protein 5.0 (L) 6.4 - 8.2 g/dL   Basic Metabolic Panel   Result Value Ref Range    Glucose 145 (H) 74 - 99 mg/dL    Sodium 134 (L) 136 - 145 mmol/L    Potassium 4.2 3.5 - 5.3 mmol/L    Chloride 101 98 - 107 mmol/L    Bicarbonate 26 21 - 32 mmol/L    Anion Gap 11 10 - 20 mmol/L    Urea Nitrogen 15 6 - 23 mg/dL    Creatinine 1.26 (H) 0.50 - 1.05 mg/dL    eGFR 58 (L) >60 mL/min/1.73m*2    Calcium 7.5 (L) 8.6 - 10.6 mg/dL   Phosphorus   Result Value Ref Range    Phosphorus 1.5 (L) 2.5 - 4.9 mg/dL   Morphology   Result Value Ref Range    RBC Morphology See Below     Polychromasia Mild     RBC Fragments Few     Alex Cells Many     Acanthocytes Few    Calcium, Ionized   Result Value Ref Range    POCT Calcium, Ionized 1.07 (L) 1.1 - 1.33 mmol/L   ECMO Arterial Blood Gas   Result Value Ref Range    POCT pH, Arterial ECMO 7.34 Reference range not established pH    POCT pCO2, Arterial ECMO 47 Reference range not established mm Hg    POCT pO2,  Arterial ECMO 449 Reference range not established mm Hg    POCT SO2, Arterial ECMO 100 Reference range not established %    POCT Oxy Hemoglobin, Arterial ECMO 94.4 Reference range not established %    POCT Base Excess, Arterial ECMO -0.8 Reference range not  established mmol/L    POCT HCO3 Calculated, Arterial ECMO 25.4 Reference range not established mmol/L    Patient Temperature 37.0 degrees Celsius    FiO2 100 %   ECMO Venous Blood Gas   Result Value Ref Range    POCT pH, Venous ECMO 7.30 Reference range not established pH    POCT pCO2, Venous ECMO 46 Reference range not established mm Hg    POCT pO2,  Venous  ECMO 50 Reference range not established mm Hg    POCT SO2, Venous ECMO 89 Reference range not established %    POCT Oxy Hemoglobin, Venous ECMO 84.6 Reference range not established %    POCT Base Excess, Venous ECMO -3.6 Reference range not established mmol/L    POCT HCO3 Calculated, Venous ECMO 22.6 Reference range not established mmol/L    Patient Temperature 37.0 degrees Celsius    FiO2 100 %   Heparin Assay, UFH   Result Value Ref Range    Heparin Unfractionated 1.0 See Comment Below for Therapeutic Ranges IU/mL   Blood Gas Arterial Full Panel   Result Value Ref Range    POCT pH, Arterial 7.37 (L) 7.38 - 7.42 pH    POCT pCO2, Arterial 46 (H) 38 - 42 mm Hg    POCT pO2, Arterial 362 (H) 85 - 95 mm Hg    POCT SO2, Arterial 100 94 - 100 %    POCT Oxy Hemoglobin, Arterial 94.4 94.0 - 98.0 %    POCT Hematocrit Calculated, Arterial 25.0 (L) 36.0 - 46.0 %    POCT Sodium, Arterial 130 (L) 136 - 145 mmol/L    POCT Potassium, Arterial 4.1 3.5 - 5.3 mmol/L    POCT Chloride, Arterial 102 98 - 107 mmol/L    POCT Ionized Calcium, Arterial 1.18 1.10 - 1.33 mmol/L    POCT Glucose, Arterial 133 (H) 74 - 99 mg/dL    POCT Lactate, Arterial 0.6 0.4 - 2.0 mmol/L    POCT Base Excess, Arterial 1.1 -2.0 - 3.0 mmol/L    POCT HCO3 Calculated, Arterial 26.6 (H) 22.0 - 26.0 mmol/L    POCT Hemoglobin, Arterial 8.3 (L) 12.0 - 16.0 g/dL    POCT Anion Gap, Arterial 6 (L) 10 - 25 mmo/L    Patient Temperature 37.0 degrees Celsius    FiO2 40 %   Tacrolimus level   Result Value Ref Range    Tacrolimus  7.4 <=15.0 ng/mL   Blood Gas Arterial Full Panel   Result Value Ref Range    POCT pH,  Arterial 7.36 (L) 7.38 - 7.42 pH    POCT pCO2, Arterial 46 (H) 38 - 42 mm Hg    POCT pO2, Arterial 404 (H) 85 - 95 mm Hg    POCT SO2, Arterial 100 94 - 100 %    POCT Oxy Hemoglobin, Arterial 94.1 94.0 - 98.0 %    POCT Hematocrit Calculated, Arterial 26.0 (L) 36.0 - 46.0 %    POCT Sodium, Arterial 131 (L) 136 - 145 mmol/L    POCT Potassium, Arterial 4.3 3.5 - 5.3 mmol/L    POCT Chloride, Arterial 103 98 - 107 mmol/L    POCT Ionized Calcium, Arterial 1.12 1.10 - 1.33 mmol/L    POCT Glucose, Arterial 131 (H) 74 - 99 mg/dL    POCT Lactate, Arterial 0.6 0.4 - 2.0 mmol/L    POCT Base Excess, Arterial 0.4 -2.0 - 3.0 mmol/L    POCT HCO3 Calculated, Arterial 26.0 22.0 - 26.0 mmol/L    POCT Hemoglobin, Arterial 8.5 (L) 12.0 - 16.0 g/dL    POCT Anion Gap, Arterial 6 (L) 10 - 25 mmo/L    Patient Temperature 37.0 degrees Celsius    FiO2 40 %    Ventilator Mode CPAP    Blood Gas Arterial Full Panel   Result Value Ref Range    POCT pH, Arterial 7.43 (H) 7.38 - 7.42 pH    POCT pCO2, Arterial 33 (L) 38 - 42 mm Hg    POCT pO2, Arterial 332 (H) 85 - 95 mm Hg    POCT SO2, Arterial 100 94 - 100 %    POCT Oxy Hemoglobin, Arterial 93.9 (L) 94.0 - 98.0 %    POCT Hematocrit Calculated, Arterial 23.0 (L) 36.0 - 46.0 %    POCT Sodium, Arterial 136 136 - 145 mmol/L    POCT Potassium, Arterial 3.6 3.5 - 5.3 mmol/L    POCT Chloride, Arterial 109 (H) 98 - 107 mmol/L    POCT Ionized Calcium, Arterial 1.01 (L) 1.10 - 1.33 mmol/L    POCT Glucose, Arterial 116 (H) 74 - 99 mg/dL    POCT Lactate, Arterial 0.5 0.4 - 2.0 mmol/L    POCT Base Excess, Arterial -2.1 (L) -2.0 - 3.0 mmol/L    POCT HCO3 Calculated, Arterial 21.9 (L) 22.0 - 26.0 mmol/L    POCT Hemoglobin, Arterial 7.8 (L) 12.0 - 16.0 g/dL    POCT Anion Gap, Arterial 9 (L) 10 - 25 mmo/L    Patient Temperature 37.0 degrees Celsius    FiO2 40 %     *Note: Due to a large number of results and/or encounters for the requested time period, some results have not been displayed. A complete set of results  can be found in Results Review.       XR chest 1 view    Result Date: 3/28/2024  Interpreted By:  Sandra Griffith and Stephens Katherine STUDY: XR CHEST 1 VIEW;  3/27/2024 8:38 pm   INDICATION: Signs/Symptoms:Post op cardiac surgery.   COMPARISON: Chest radiograph 03/27/2024   ACCESSION NUMBER(S): [id number]   ORDERING CLINICIAN: YESENIA KHAN   FINDINGS: AP radiograph of the chest was provided.   Endotracheal tube noted with tip projecting approximately 2.0 cm in the right mainstem bronchus. Enteric tube seen coursing below the level diaphragm with tip out of the field of view. Right subclavian Impella device overlying the cardiomediastinal silhouette. Right IJ central venous catheter with tip overlying the mid SVC. ECMO cannula with tip overlying the expected location of the right atrium. Surgical clips overlie the cardiomediastinal silhouette and right axilla. Cardiac device projects over the cardiomediastinal silhouette. Postsurgical changes from median sternotomy.   CARDIOMEDIASTINAL SILHOUETTE: The cardiomediastinal silhouette is obscured. The mediastinum is shifted to the left likely secondary atelectasis.   LUNGS: Hazy opacification of the left hemithorax. Right basilar atelectasis. No pneumothorax.   ABDOMEN: No remarkable upper abdominal findings.   BONES: No acute osseous changes.       1.  Endotracheal tube with tip in the right mainstem bronchus, retraction of at least 2 cm is recommended. 2. Hazy opacification of the left hemithorax with leftward mediastinal shift, likely secondary atelectasis. 3. Postsurgical changes and medical devices as described above.   I personally reviewed the images/study and I agree with Charity Ross DO's (radiology resident) findings as stated. This study was interpreted at [facility], [place], [place].   MACRO: Charity Ross discussed the significance and urgency of this critical finding by telephone with  YESENIA KHAN on  3/27/2024 at 9:37 pm.  (**-RCF-**) Findings:  See findings.     Signed by: Sandra Griffith 3/28/2024 9:19 AM Dictation workstation:   [id number]    Vascular US Lower Extremity Arterial Duplex Bilateral    Result Date: 3/28/2024  Preliminary Cardiology Report           [place] [address]   Tel [phone] and Fax [phone]                 Preliminary Vascular Lab Report  VASC US LOWER EXTREMITY ARTERIAL DUPLEX GRAFT BILATERAL W/ KATHIE  Patient Name:     MIGUEL DIMAS      Reading           [id number] [address]                Physician:        MD Study Date:       3/27/2024            Ordering          [id number] [address]ION[address] WHYTE                                        Physician: MRN/PID:          [id number]             Technologist:     Evangelina Bailey RVT Accession#:       [id number]         Technologist 2:   Joy Lewis RVT Date of           1990             Encounter#:       [id number] Birth/Age: Gender:           F Admission Status: Inpatient            Location          [facility]                                        Performed:  Diagnosis/ICD: Acute Kidney Failure-N17.9; Encounter for other preprocedural                examination-Z01.818 Indication:    pre ECMO placement Procedure/CPT: 17666 Peripheral artery Lower arterial Duplex BPG complete  **Report Amended** Report Amended By: Evangelina Bailey RVT     Date and Time: 3/28/2024 at 3/28/2024  **CRITICAL RESULT** Critical Result: Left SFA prox occlusion with reconstitution via collateralized flow noted distally. Notification called to Pretty Toussaint MD on 3/27/2024 at 3:20:25 PM by Evangelina Bailey RVT.  PRELIMINARY CONCLUSIONS: Right Lower Arterial: Evaluated right EIA distal, CFA, SFA prox, and Profunda appear patent. Left Lower Arterial: There appears to be a short segment occlusion noted in the proximal SFA just distal to the CFA bifurcation. There is  collateralized flow noted distally. There are elevated velocities noted in the left CFA, may be overestimated due to shadowing from previous procedures. Right Lower Venous: The evaluated right EIV distal, CFV, FV prox, and DFV appear patent with no evidence of acute deep vein thrombus. Left Lower Venous: Cannot rule out thrombus in non-compressible iliac and common femoral vein veins. Patient unable to tolerate compressions. Color flow and Doppler noted. No ultrasonic evidence of acute deep vein thrombus in the evaluated left EIV distal, CFV, FV prox, and DFV.  Additional Findings: Irregular heart rhythm noted due to cardiac device. Left groin was technically difficult due to previous procedures and scarring.  Imaging & Doppler Findings:  Right                 Compressible Thrombus   Flow Distal External Iliac     Yes        None   Pulsatile CFV                       Yes        None   Pulsatile PFV                       Yes        None   Pulsatile FV Proximal               Yes        None   Pulsatile  Left                  Compress Thrombus   Flow Distal External Iliac                   Pulsatile CFV                                     Pulsatile PFV                                     Pulsatile FV Proximal             Yes      None   Pulsatile  Right                     Left   PSV                       PSV 56 cm/s        EIA        30 cm/s 29 cm/s        CFA        72 cm/s 36 cm/s Profunda Proximal 70 cm/s 40 cm/s   SFA Proximal    28 cm/s  VASCULAR PRELIMINARY REPORT completed by Evangelina Bailey DILMA on 3/28/2024 at 7:32:14 AM  ** Final (Updated) **     XR abdomen 1 view    Result Date: 3/27/2024  Interpreted By:  Sandra Griffith and Sheng Max STUDY: XR ABDOMEN 1 VIEW; XR CHEST 1 VIEW;  3/26/2024 7:12 pm; 3/27/2024 7:44 am   INDICATION: Signs/Symptoms:S/p OHT r/o ileas; Signs/Symptoms:Impella.   COMPARISON: Chest radiographs dated 03/26/2024, 03/25/2024, 03/22/2024 and CT chest abdomen pelvis dated 03/17/2024    ACCESSION NUMBER(S): [id number]; [id number]   ORDERING CLINICIAN: FILOMENA KEANE   FINDINGS: AP radiographs of the chest and abdomen were provided:   LINES AND DEVICES: Right subclavian approach Impella device projects over the left ventricle. Right internal jugular approach central venous catheter tip projects over the lower SVC. CardioMEMS device projects over the cardiomediastinal silhouette. Numerous cardiac pacing wires are present. Surgical clips project over the cardiomediastinal silhouette and right axilla. Numerous intact median sternotomy wires.   CARDIOMEDIASTINAL SILHOUETTE: Stable enlargement of the cardiomediastinal silhouette is stable in size and configuration.   LUNGS: There is no pulmonary edema. Linear subsegmental bibasilar atelectasis. Trace bilateral pleural effusions. No focal consolidation or pneumothorax is seen.   ABDOMEN: Paucity of gas in the imaged bowel loops with nonobstructive bowel gas pattern. Limited evaluation of pneumoperitoneum on supine imaging, however no gross evidence of free air is noted.   BONES: No acute osseous abnormality.       1. Stable enlargement of the cardiomediastinal silhouette with trace bilateral pleural effusions. 2. Paucity of gas in the imaged bowel loops with nonobstructive bowel gas pattern.     I personally reviewed the images/study and I agree with the findings as stated by Dr. Tee Joe. This study was interpreted at [place], [place].   MACRO: none   Signed by: Sandra Griffith 3/27/2024 2:51 PM Dictation workstation:   [id number]    XR chest 1 view    Result Date: 3/27/2024  Interpreted By:  Sandra Griffith and Sheng Max STUDY: XR ABDOMEN 1 VIEW; XR CHEST 1 VIEW;  3/26/2024 7:12 pm; 3/27/2024 7:44 am   INDICATION: Signs/Symptoms:S/p OHT r/o ileas; Signs/Symptoms:Impella.   COMPARISON: Chest radiographs dated 03/26/2024, 03/25/2024, 03/22/2024 and CT chest abdomen pelvis dated 03/17/2024    ACCESSION NUMBER(S): [id number]; [id number]   ORDERING CLINICIAN: FILOMENA KEANE   FINDINGS: AP radiographs of the chest and abdomen were provided:   LINES AND DEVICES: Right subclavian approach Impella device projects over the left ventricle. Right internal jugular approach central venous catheter tip projects over the lower SVC. CardioMEMS device projects over the cardiomediastinal silhouette. Numerous cardiac pacing wires are present. Surgical clips project over the cardiomediastinal silhouette and right axilla. Numerous intact median sternotomy wires.   CARDIOMEDIASTINAL SILHOUETTE: Stable enlargement of the cardiomediastinal silhouette is stable in size and configuration.   LUNGS: There is no pulmonary edema. Linear subsegmental bibasilar atelectasis. Trace bilateral pleural effusions. No focal consolidation or pneumothorax is seen.   ABDOMEN: Paucity of gas in the imaged bowel loops with nonobstructive bowel gas pattern. Limited evaluation of pneumoperitoneum on supine imaging, however no gross evidence of free air is noted.   BONES: No acute osseous abnormality.       1. Stable enlargement of the cardiomediastinal silhouette with trace bilateral pleural effusions. 2. Paucity of gas in the imaged bowel loops with nonobstructive bowel gas pattern.     I personally reviewed the images/study and I agree with the findings as stated by Dr. Tee Joe. This study was interpreted at [facility], [place], [place].   MACRO: none   Signed by: Sandra Griffith 3/27/2024 2:51 PM Dictation workstation:   [id number]    Transthoracic Echo (TTE) Complete    Result Date: 3/27/2024   [facility], [address] [place] [address]                Tel [phone] and Fax [phone] TRANSTHORACIC ECHOCARDIOGRAM REPORT  Patient Name:      ELVIRAKHURRAM MONTELONGO JUDIE      Reading Physician:    [id number] Patrica BASS                                       MD Study Date:        3/27/2024            Ordering Provider:    [id number] KONSTANTIN FINNEY                                                               PERKINS MRN/PID:           [id number]             Fellow: Accession#:        [id number]         Nurse: Date of Birth/Age: 1990 / 34 years  Sonographer:          MAURO Colbert RDCS Gender:            F                    Additional Staff: Height:            152.40 cm            Admit Date:           2/15/2024 Weight:            95.71 kg             Admission Status:     Inpatient - STAT BSA / BMI:         1.91 m2 / 41.21      Encounter#:           [id number]                    kg/m2                                         Department Location:  [address] Blood Pressure: 93 /57 mmHg Study Type:    TRANSTHORACIC ECHO (TTE) COMPLETE Diagnosis/ICD: 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 Definity used as a contrast agent for endocardial border               definition. Total contrast used for this procedure was 3 mL via IV               push. The patient was awake.  PHYSICIAN INTERPRETATION: Left Ventricle: The left ventricular systolic function is severely decreased, with an estimated ejection fraction of 25%. There is global hypokinesis of the left ventricle with minor regional variations. The left ventricular cavity size is normal. Left ventricular diastolic filling was indeterminate. There is no definite left ventricular thrombus visualized. Echocontrast was used and excluded LV apical thrombus. Left Atrium: The left atrium is consistent with transplant. Right Ventricle: The right ventricle is mildly enlarged. There is reduced right ventricular systolic function. Right Atrium: The right atrium is mildly dilated. Aortic Valve: The aortic valve was not well visualized.  There is indeterminate aortic valve regurgitation. Mitral Valve: The mitral valve is mildly thickened. There is moderate to severe mitral valve regurgitation which is centrally directed. Tricuspid Valve: The tricuspid valve is structurally normal. There is severe tricuspid regurgitation. The Doppler estimated RVSP is within normal limits at 28.8 mmHg. RSVP likely underestimated due to the severity of TR. Pulmonic Valve: The pulmonic valve is not well visualized. Pulmonic valve regurgitation was not assessed. Pericardium: There is a trivial pericardial effusion. Pleural: There is left pleural effusion. Aorta: The aortic root is normal. In comparison to the previous echocardiogram(s): Compared with the prior exam from 3/25/2024, there is still severe global LV systolic dysfunction while on full Impella suppoert. There was already moderate to severe MR which persists. TR was not assessed on the prior study but is severe TR today. Although the RVSP is estimated to be normal based on TR Vmax, that is likely underestimated due to the severity of TR. There was previously severely reduced RV function previously and appears imilar today.  LEFT VENTRICULAR ASSIST DEVICE: LVAD: The patient has a(n) Impella LVAD device present. LVAD Comments: IMpella extends approx 3.8-4.0 cm beneath the AV and is angles posteriorly.  CONCLUSIONS:  1. Left ventricular systolic function is severely decreased with a 25% estimated ejection fraction.  2. Echocontrast was used and excluded LV apical thrombus.  3. No left ventricular thrombus visualized.  4. There is reduced right ventricular systolic function.  5. Moderate to severe mitral valve regurgitation.  6. RVSP within normal limits.  7. Severe tricuspid regurgitation visualized.  8. Compared with the prior exam from 3/25/2024, there is still severe global LV systolic dysfunction while on full Impella suppoert. There was already moderate to severe MR which persists. TR was not assessed on the  prior study but is severe TR today. Although the RVSP is estimated to be normal based on TR Vmax, that is likely underestimated due to the severity of TR. There was previously severely reduced RV function previously and appears imilar today.  9. There is global hypokinesis of the left ventricle with minor regional variations. QUANTITATIVE DATA SUMMARY: 2D MEASUREMENTS:                          Normal Ranges: IVSd:          0.90 cm   (0.6-1.1cm) LVPWd:         0.90 cm   (0.6-1.1cm) LVIDd:         4.20 cm   (3.9-5.9cm) LVIDs:         3.30 cm LV Mass Index: 62.1 g/m2 LV % FS        21.4 % AORTA MEASUREMENTS:                      Normal Ranges: Ao Sinus, d: 2.00 cm (2.1-3.5cm) LV SYSTOLIC FUNCTION BY 2D PLANIMETRY (MOD):                     Normal Ranges: EF-A4C View: 25.6 % (>=55%) EF-A2C View: 26.2 % EF-Biplane:  35.2 %  RIGHT VENTRICLE: RV Basal 3.50 cm RV Mid   2.40 cm RV Major 7.0 cm TAPSE:   6.5 mm TRICUSPID VALVE/RVSP:                             Normal Ranges: Peak TR Velocity: 2.54 m/s RV Syst Pressure: 28.8 mmHg (< 30mmHg)  [id number] Patrica Aguilera MD Electronically signed on 3/27/2024 at 1:20:02 PM  ** Final **         This patient has a urinary catheter   Reason for the urinary catheter remaining today? Urine catheter unnecessary, will be removed today    This patient is intubated   Reason for patient to remain intubated today? they are planned for extubation trial later today/tonight       Malnutrition Diagnosis Status: Ongoing  Malnutrition Diagnosis: Moderate malnutrition related to acute disease or injury  As Evidenced by: pt's reported intake likely meeting <75% of estimated needs for at least 7 days, previous 5% weight loss in 1 month, LOS 42.  I agree with the dietitian's malnutrition diagnosis.      Assessment/Plan   Ms. Yimi Bowles is a 34F with a PMHx sig for stage D HFrEF (PPCM) s/p ICD s/p CardioMEMs, hypothyroidism 2/2 thyroidectomy, obesity s/p gastric bypass (2017), and SLE who is s/p OHT (3/31/2022)  with a post-OHT course complicated by RIJ/RUE DVTs, leukopenia, left groin seroma, and asymptomatic 2R ACR (11/2022) treated with steroids, s/p total hysterectomy (2023), Flu A (1/2024).     Originally presented to [facility] ED on 2/15/24 with complaints of N/V x 3 days and inability to keep medications down. Found to have acute systolic heart failure with primary graft failure and cardiogenic shock. Treated for suspected acute cellular vs. acute antibody mediated rejection; however, multiple cardiac biopsies negative for signs of rejection or other causes of graft failure. Course has been complicated by multiple MCS devices for refractory cardiogenic shock (right femoral IABP: 2/18/24 - 3/1/24, Left femoral VA ECMO: 2/19/24 - 2/29/24, Right axillary impella 5.5: 3/1/24 - remains in place, 2nd right femoral VA ECMO: 3/27/24 - remains in place), ARF requiring RRT, acute liver injury, intubation due to volume overload in setting of pulmonary edema, severe hemolysis 2/2 to impella malposition, mild CMV viremia, bilateral provoked IJ DVTs, multiple episodes of epistaxis & coagulopathies requiring ongoing blood product transfusions.       Transferred to CTICU on 3/27/24 following right femoral VA ECMO placement due to worsening cardiogenic shock and multi-organ failure despite Impella 5.5 support and multiple inotropes.       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  #Acute renal failure requiring RRT   #Acute transaminitis  #Epistaxis   #Acute coagulopathy   #Low grade CMV Viremia  #Provoked bilateral IJ DVTs    #Thrombocytopenia      Donor/Recipient Infectious history:  CMV: -/+ (low grade CMV viremia w/ levels < 35 on 3/1/24, repeat CMV levels remain negative since 3/8/24)  Toxo: -/-   Hep C: -/-     Rejection/Prophylaxis (transplants):  - Steroids: Continue 10mg PO prednisone daily  - Tacrolimus: 0.5mg liquid @ 0630 and 0.5mg liquid @  1830 w/ daily levels drawn @ 0600  - Tacrolimus goal troughs: 8-10  - Myfortic acid: 180mg BID    - Antifungals: nystatin 500,000units Q6  - Antivirals: valcyte 450mg Q48hrs   - Anti PCP & Toxoplasmosis: holding Bactrim SS daily due to thrombocytopenia     Last cardiac biopsy: 2/16/24 with ACR grade 1R and no AMR (extremely low C4d+ detected), 2/20/24 negative for ACR and AMR  Last HLA: 3/2/24 negative for DSAs   Last RHC: closing numbers on 3/16/24 /86(97) RA 20, PAP 40/33(37), C.O./C.I. 5.5/2.8, PVR 88, SVR 1115   Last LHC: 2/18/24 negative for CAV and CAD   Last TTE/BOB: 3/27/24 shows LVEF 25% w/ global hypokinesis, severe RV dysfunction, mod-severe MR, severe TR and impella angled posteriorly   Osteopenia/osteoporosis prophylaxis: Vitamin D3 and calcium supplements  Peptic/gastric ulcer prophylaxis: Pantoprazole 40mg BID -> can transition to daily in next 24-48hrs if no signs of UGIB   CAV Prophylaxis: Holding Aspirin 81mg due to continued thrombocytopenia & pravastatin due to transaminitis     - Unclear cause of acute severe graft dysfunction. Broad differential including SLE flair, giant cell vasculitis, CAV/CAD, viral cardiomyopathy, sarcoidosis, amyloidosis and allograft rejection amongst many others. Most likely smoldering ACR vs. AMR; however, 2xallograft biopsies on 2/16 & 2/20 remain negative for any significant pathology. Notably, both biopsies taken after rejection therapies implemented which may have reduced areas of graft damage.    - DSAs remain negative; however, patient may have non-HLA antibodies present. 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   - Negative CAV & CAD via LHC on 2/18/24   - Completed methylpred steroid pulse w/ 1g Q24 x 3 days (2/16-2/18) and prednisone taper   - Thymoglobulin doses: 2/18 & 2/19   - IVIG + PLEX Session: 2/18, 2/20, 3/7, 3/11 and 3/13    - Right femoral VA ECMO flowing 3.5L w/ FIO2 100% and sweep 1.5  - Right axillary impella for LV venting remains  in place and set P6 w/ flows of 1.9-2.3  - Epinephrine 0.04mcg/kg/min and dobutamine 5.0mcg/kg/min   - Decreased pulse pressure of ~5-15 on A-line with pulsatile flow.  - Lactate normalized    - Remains sedated with Precedex and intubated via ETT: Vent lung protective and set ACVC FIO2 50%, RR 10,  and PEEP 8   - LFTs improving s/p ECMO cannulation with stable hemolytic labwork   - Previously cardiorenal induced ARF resolved w/ renal recovery following ECMO cannulation. RRT stopped on 2/27/24 and patient had been making appropriate UOP. Post ECMO decannulation, again developed ARF requiring RRT. Her kidneys have had multiple injuries due to poor cardiac output and now with hemolysis induced injury. Will have been on RRT x 6 weeks as of 3/29/24, meeting criteria for kidney transplantation. Abdominal Transplant team will formally approve for kidney transplantation pending approval for heart transplantation.    - At this time, the patient has developed refractory heart failure and renal failure. Discussion for heart and kidney transplantation are ongoing. Concerns remain with deconditioning, multiorgan failure, lack of diagnosis causing graft failure, possible rejection despite appropriate immunotherapy, and optimal immunotherapy plan if re-transplanted.      Recommendations:  - Agree with line holiday CVVHD currently running through ECMO circuit), however ideally would like PA catheter for further optimization. Line placement difficult given L internal jugular thrmobosis, femoral ECMO cannula, and thrombocytopenia.    - Agree with CVVH via ECMO circuit   - Agree with SBT and potential extubation   - Agree with holding on heparin infusion today with recent epistaxis and current coagulopathy; continue close monitoring of bilateral distal extremity perfusion. Patient supratherapetuic today - continue to hold   - Tacro 0.5mg/1mg liquid BID (increased today 2/2 sub therapeutic level & absence of Myfortic currently as  pt remains intubated)   - Okay to miss today's morning dose of myfortic. If unable to extubate and/or pass swallow eval, will discuss options for evening dose. 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   - Please obtain echo   - Plan to discuss heart transplantation at thoracic transplant selection meeting on 4/2/24     Appreciate continued CTICU care/management.     Patient was examined and discussed with multidisciplinary team & Advanced Heart Failure attending Dr. Price       Heart Transplant Team:   Epic Secure Chat  [phone] during day shift     JIMMY Daniel-CNP

## 2024-03-29 NOTE — PROGRESS NOTES
"Charla Bowles is a pleasant 34 y.o. female on day 43 of admission presenting with Cardiogenic shock (CMS/HCC).    Subjective   JOSÉ  Remains on ECMO       Objective   Last Recorded Vitals  Blood pressure (!) 112/105, pulse 82, temperature 37 °C (98.6 °F), temperature source Temporal, resp. rate 22, height 1.549 m (5' 0.98\"), weight 96 kg (211 lb 10.3 oz), SpO2 97 %.    Intake/Output last 3 Shifts:  I/O last 3 completed shifts:  In: 2970.6 (30.9 mL/kg) [I.V.:2360.6 (24.6 mL/kg); Blood:350; NG/GT:210; IV Piggyback:50]  Out: 4022 (41.9 mL/kg) [Urine:10 (0 mL/kg/hr); Other:4012]  Weight: 96 kg     Physical Exam  CONSTITUTIONAL: Alert and oriented. No acute distress.  EYES: No scleral icterus. Conjunctiva clear.  RESPIRATORY: Normal effort on room air. No stridor.  ABDOMINAL: Slightly protuberant. Nondistended.   MUSCULOSKELETAL: Normal strength and tone.  SKIN: Normal tugor. ECMO cannulas in place.  EXTREMITIES: No gross extremity deformity.  NEUROLOGICAL: Grossly intact. No focal deficits.     CARDIAC: Regular rate and rhythm on the monitor.  VASCULAR:  Trace lower extremity edema  Multiphasic left dorsalis pedis signal  Monophasic right dorsalis pedis signal    Relevant Results  Medications:  Scheduled Meds:[Held by provider] acetaminophen, 650 mg, oral, q4h  calcitriol, 0.5 mcg, oral, Daily  calcium carbonate-vitamin D3, 1 tablet, oral, Daily  cephalexin, 500 mg, oral, q12h TRICIA  cyanocobalamin, 500 mcg, oral, Daily  darbepoetin floyd, 100 mcg, subcutaneous, q14 days  ferrous sulfate (325 mg ferrous sulfate), 65 mg of iron, oral, Daily with breakfast  folic acid, 1 mg, oral, Daily  insulin lispro, 0-15 Units, subcutaneous, q4h  levothyroxine, 200 mcg, oral, Daily  lidocaine, 1 patch, transdermal, Daily  multivitamin with iron-minerals, 15 mL, oral, Daily  multivitamin with minerals, 1 tablet, oral, Daily  [Held by provider] mycophenolate, 180 mg, oral, BID  nystatin, 5 mL, Swish & Swallow, q6h  oxygen, , " inhalation, Continuous - Inhalation  pantoprazole, 40 mg, intravenous, BID  perflutren lipid microspheres, 0.5-10 mL of dilution, intravenous, Once in imaging  perflutren protein A microsphere, 0.5 mL, intravenous, Once in imaging  perflutren protein A microsphere, 0.5 mL, intravenous, Once in imaging  polyethylene glycol, 17 g, oral, BID  predniSONE, 10 mg, oral, Daily  sennosides-docusate sodium, 2 tablet, oral, BID  [Held by provider] sulfamethoxazole-trimethoprim, 80 mg of trimethoprim, oral, Daily  sulfur hexafluoride microsphr, 2 mL, intravenous, Once in imaging  sulfur hexafluoride microsphr, 2 mL, intravenous, Once in imaging  tacrolimus, 0.5 mg, oral, q12h TRICIA  [Held by provider] traZODone, 25 mg, oral, Nightly  valGANciclovir, 450 mg, oral, q48h      Continuous Infusions:dexmedeTOMIDine, 0.1-1.5 mcg/kg/hr, Last Rate: 0.2 mcg/kg/hr (03/29/24 1217)  heparin, 0-4,000 Units/hr, Last Rate: Stopped (03/29/24 0700)  heparin Impella Purge 25 units/mL in 500 mL D5W, 10 mL/hr, Last Rate: 10 mL/hr (03/29/24 1200)  lactated Ringer's, 5 mL/hr, Last Rate: 5 mL/hr (03/29/24 1200)  PrismaSol 4/2.5, 25 mL/kg/hr      PRN Meds:.PRN medications: calcium gluconate, calcium gluconate, dextrose, dextrose **OR** glucagon, dextrose **OR** glucagon, diphenhydrAMINE, glucagon, HYDROmorphone, artificial tears, magnesium sulfate, magnesium sulfate, microfibrllar collagen, mupirocin, naloxone, ondansetron, oxyCODONE, sodium chloride    Labs:  Results from last 7 days   Lab Units 03/29/24  0056 03/28/24  1107 03/28/24  0146   SODIUM mmol/L 134* 132* 132*   POTASSIUM mmol/L 4.2 4.5 4.5   CHLORIDE mmol/L 101 101 100   BUN mg/dL 15 21 20   CREATININE mg/dL 1.26* 1.65* 1.38*     Results from last 7 days   Lab Units 03/29/24  0943 03/29/24  0056 03/28/24  1107   WBC AUTO x10*3/uL 7.0 7.9 8.7   HEMOGLOBIN g/dL 7.9* 8.2* 8.5*   HEMATOCRIT % 23.4* 24.0* 24.5*   PLATELETS AUTO x10*3/uL 62* 39* 111*       Imaging:  Imaging from the last 24 hours  reviewed independently by the vascular surgery team.        Assessment/Plan   Charla Bowles is a pleasant 34 y.o. female with reduced perfusion of the left leg in the setting of cardiogenic shock with cannulation of the left CFA and CFV for VA ECMO and high pharmacomechanical circulatory support.    Plan:  Wean pharmacological and mechanical circulatory support as able.   Continue neurovascular examinations of the extremities q1hr  Keep feet warm    Vascular surgery will peripherally follow. Please call us with any questions, concerns or changes in the patients clinical status.          Patient seen and discussed with the attending, Dr. Cat.    Ricardo Cid MD  Vascular Surgery Fellow  Team Pager [phone]  Available on Secure [employer]     No

## 2024-03-29 NOTE — NURSING NOTE
Multidisciplinary ECMO Rounds Note    Attendance:  CT Surgeon: No  CTICU Attending: Dr. Mehta  Palliative Care Attending: No  Physical Therapy Present (Y/N): No  Perfusion Present (Y/N): Yes    ECMO Indication: Cardiogenic shock  ECMO Cannula Configuration: Right fem [facility]    Changes made to Hemodynamic/Ventilator/ECMO Management: Impella was increased to P7 in hopes to vent LV more. Patient remains on no inotropes or pressors and is hemodynamically stable    Circuit Concerns: no clots or fibrin notes  Bleeding Concerns: none  Clotting/Ischemia Concerns: Pulses have returned bilateral in both lower extremities  Anticoagulation Plan/Monitoring: Heparin is currently being held for heparin assay of 1. Team and bedside nurse will continue to monitor heparin assay.    Mobility Plan/Candidacy: No  Nutrition: Tube feed started   Patient/Family Concerns: some of family is frusturated with lack of progress the patient is making, others have some understanding   Nursing Concerns: none at this time    Patients Minimally Acceptable Outcome: JOSE  Barriers to decannulation/anticipated decannulation date:Pt.'s LDH is continuously rising, impella support increased. Pulmonary edema is restricting patient from extuabtion. Transplant meeting this coming Tuesday to reassess patient's progress and possibly re listing to transplant list.     ECMO:     Most Recent Range Past 24hrs   Flow 3.62 Patient Flow (L/min)  Min: 3.31  Max: 3.74   Speed 3164 Circuit Flow (RPM)  Min: 3164  Max: 3166   Sweep 1.5 Sweep Gas Flow Rate (L/min)  Min: 1.5  Max: 1.5       dexmedeTOMIDine, 0.1-1.5 mcg/kg/hr, Last Rate: 0.2 mcg/kg/hr (03/29/24 1400)  heparin, 0-4,000 Units/hr, Last Rate: Stopped (03/29/24 0700)  heparin Impella Purge 25 units/mL in 500 mL D5W, 10 mL/hr, Last Rate: 10 mL/hr (03/29/24 1400)  lactated Ringer's, 5 mL/hr, Last Rate: 5 mL/hr (03/29/24 1400)  PrismaSol 4/2.5, 25 mL/kg/hr          Vent Mode: Volume control/assist control  FiO2 (%):  [40  %-50 %] 40 %  S RR:  [10] 10  S VT:  [300 mL-400 mL] 400 mL  PEEP/CPAP (cm H2O):  [5 cm H20-8 cm H20] 8 cm H20  MO SUP:  [8 cm H20] 8 cm H20  MAP (cm H2O):  [5-114] 18

## 2024-03-29 NOTE — PROGRESS NOTES
"Charla Bowles is a 34 y.o. female on day 43 of admission presenting with Cardiogenic shock (CMS/HCC). Patient declined while on axillary 5.5 and was placed back on VA-ECMO on 3/27/24.      Objective     Physical Exam    Last Recorded Vitals  Blood pressure (!) 112/105, pulse 75, temperature 37 °C (98.6 °F), temperature source Temporal, resp. rate (!) 27, height 1.549 m (5' 0.98\"), weight 96 kg (211 lb 10.3 oz), SpO2 100 %.  Intake/Output last 3 Shifts:  I/O last 3 completed shifts:  In: 2970.6 (30.9 mL/kg) [I.V.:2360.6 (24.6 mL/kg); Blood:350; NG/GT:210; IV Piggyback:50]  Out: 4022 (41.9 mL/kg) [Urine:10 (0 mL/kg/hr); Other:4012]  Weight: 96 kg         Assessment/Plan   Principal Problem:    Cardiogenic shock (CMS/HCC)  Active Problems:    Heart transplant recipient (CMS/HCC)    ESRD (end stage renal disease) on dialysis (CMS/HCC)    HIRAM (acute kidney injury) (CMS/HCC)    Acute passive congestion of liver    Hyponatremia    Iron deficiency anemia    Abdominal pain    Systolic congestive heart failure (CMS/HCC)    Cardiac transplant rejection (CMS/HCC)    Acute combined systolic (congestive) and diastolic (congestive) heart failure (CMS/HCC)    History of cardiac transplantation and recent admission for heart failure progressing to cardiogenic shock currently supported with axillary 5.5 and femoral VA-ECMO, with concomitant renal failure supported with CRRT.    Will pull fluid and work towards extubation as pulmonary edema improves.  Plt of 30 noted. Monitor. Recent PF4-  Echo per HF  Prophy/Immunosuppression per HF    Due to the high probability of life threatening clinical decompensation, the patient required critical care time evaluating and managing this patient.  Critical care time included obtaining a history, examining the patient, ordering and reviewing studies, discussing, developing, and implementing a management plan, evaluating the patient's response to treatment, and discussion with other care " team providers. I saw and evaluated the patient myself. I reviewed the provider's documentation and discussed the patient with the provider. Critical care time was performed exclusive of billable procedures.    Critical Care Time: 40 minutes              Quang Mehta MD

## 2024-03-29 NOTE — PROGRESS NOTES
"Charla Bowles is a 34 y.o. female on day 43 of admission presenting with Cardiogenic shock (CMS/HCC).      Objective     Physical Exam    Last Recorded Vitals  Blood pressure (!) 112/105, pulse 82, temperature 37 °C (98.6 °F), temperature source Temporal, resp. rate 22, height 1.549 m (5' 0.98\"), weight 96 kg (211 lb 10.3 oz), SpO2 97 %.  Intake/Output last 3 Shifts:  I/O last 3 completed shifts:  In: 2970.6 (30.9 mL/kg) [I.V.:2360.6 (24.6 mL/kg); Blood:350; NG/GT:210; IV Piggyback:50]  Out: 4022 (41.9 mL/kg) [Urine:10 (0 mL/kg/hr); Other:4012]  Weight: 96 kg       Assessment/Plan   Principal Problem:    Cardiogenic shock (CMS/HCC)  Active Problems:    Heart transplant recipient (CMS/HCC)    ESRD (end stage renal disease) on dialysis (CMS/HCC)    HIRAM (acute kidney injury) (CMS/HCC)    Acute passive congestion of liver    Hyponatremia    Iron deficiency anemia    Abdominal pain    Systolic congestive heart failure (CMS/HCC)    Cardiac transplant rejection (CMS/HCC)    Acute combined systolic (congestive) and diastolic (congestive) heart failure (CMS/HCC)    Patient requires ECMO support. Drainage cannula site, cannula, pump, oxygenator, return cannula and return cannula site clinically inspected. RPM and sweep reviewed and adjusted appropriate to clinical context. Anticoagulation status and intensity discussed. Plan for weaning, next clinical steps discussed with team and family. Discussed with cardiothoracic surgeon, perfusion, palliative care and physical/occupational therapy    ECMO Indication: cs  ECMO Cannula Configuration: fem-fem  ECMO circuit run from drainage cannula to pump to oxygenator to return cannula: y  Presence of cannula bleeding: n  Presence of oxygenator clot: n  Pre/post PaO2 adequate: y  LV Unloading/Pulse Pressure: impella  Distal Perfusion: ok  Anticoagulation Plan/Monitoring: hold for plt 30  Mobility Plan/Candidacy: n  Path to decannulation/anticipated decannulation " date:unclear      dexmedeTOMIDine, 0.1-1.5 mcg/kg/hr, Last Rate: 0.2 mcg/kg/hr (03/29/24 1300)  heparin, 0-4,000 Units/hr, Last Rate: Stopped (03/29/24 0700)  heparin Impella Purge 25 units/mL in 500 mL D5W, 10 mL/hr, Last Rate: 10 mL/hr (03/29/24 1300)  lactated Ringer's, 5 mL/hr, Last Rate: 5 mL/hr (03/29/24 1300)  PrismaSol 4/2.5, 25 mL/kg/hr          Vent Mode: Volume control/assist control  FiO2 (%):  [40 %-50 %] 40 %  S RR:  [10] 10  S VT:  [300 mL-400 mL] 400 mL  PEEP/CPAP (cm H2O):  [5 cm H20-8 cm H20] 8 cm H20  CA SUP:  [8 cm H20] 8 cm H20  MAP (cm H2O):  [5-114] 18              Quang Mehta MD

## 2024-03-29 NOTE — PROGRESS NOTES
"        INPATIENT TRANSPLANT NEPHROLOGY PROGRESS NOTE          REASON FOR CONSULT:  Immunosuppressive medication management and nephrology related issues.    SUBJECTION:  CVVH Procedure note    ECMO  On pressors  Tolerated CVVH well  Discussed with heart failure team   No acute event overnight.    PHYCISCAL EXAMINATION:    Visit Vitals  BP (!) 112/105   Pulse 82   Temp 37 °C (98.6 °F) (Temporal)   Resp 22   Ht 1.549 m (5' 0.98\")   Wt 96 kg (211 lb 10.3 oz)   SpO2 97%   BMI 40.01 kg/m²   OB Status Hysterectomy   Smoking Status Never   BSA 2.03 m²        03/27 1900 - 03/29 0659  In: 2970.6 [I.V.:2360.6]  Out: 4022 [Urine:10]     Weight change:     Constitutional:       General: She is not in acute distress.     Appearance: Normal appearance. She is ill-appearing and toxic-appearing.   HENT:      Head: Normocephalic.      Mouth/Throat:      Mouth: Mucous membranes are moist.   Eyes:      Extraocular Movements: Extraocular movements intact.      Pupils: Pupils are equal, round, and reactive to light.   Neck:      Comments: RIJ dialysis line present - CDI  Cardiovascular:      Rate and Rhythm: Regular rhythm. Tachycardia present.      Pulses: Normal pulses.      Heart sounds: Normal heart sounds.   Pulmonary:      Effort: Pulmonary effort is normal. No respiratory distress.      Breath sounds: Normal breath sounds.   Chest:      Comments: Right axillary impella site CDI   Abdominal:      General: Bowel sounds are normal.      Palpations: Abdomen is soft.      Tenderness: There is no abdominal tenderness.   Musculoskeletal:         General: Normal range of motion.      Cervical back: Normal range of motion.      Right lower leg: Edema present.      Left lower leg: Edema present.   Skin:     General: Skin is warm and dry.      Capillary Refill: Capillary refill takes 2 to 3 seconds.      Coloration: Skin is jaundiced.      Comments: Left groin ECMO cannulation site with drainage    Neurological:      General: No focal " deficit present.      Mental Status: She is alert and oriented to person, place, and time.   Psychiatric:         Behavior: Behavior normal. Behavior is cooperative.     MEDICATION LIST: REVIEWED    [Held by provider] acetaminophen, 650 mg, q4h  calcitriol, 0.5 mcg, Daily  calcium carbonate-vitamin D3, 1 tablet, Daily  cephalexin, 500 mg, q12h TRICIA  cyanocobalamin, 500 mcg, Daily  darbepoetin floyd, 100 mcg, q14 days  ferrous sulfate (325 mg ferrous sulfate), 65 mg of iron, Daily with breakfast  folic acid, 1 mg, Daily  insulin lispro, 0-15 Units, q4h  levothyroxine, 200 mcg, Daily  lidocaine, 1 patch, Daily  multivitamin with iron-minerals, 15 mL, Daily  multivitamin with minerals, 1 tablet, Daily  [Held by provider] mycophenolate, 180 mg, BID  nystatin, 5 mL, q6h  oxygen, , Continuous - Inhalation  pantoprazole, 40 mg, BID  perflutren lipid microspheres, 0.5-10 mL of dilution, Once in imaging  perflutren protein A microsphere, 0.5 mL, Once in imaging  perflutren protein A microsphere, 0.5 mL, Once in imaging  polyethylene glycol, 17 g, BID  predniSONE, 10 mg, Daily  sennosides-docusate sodium, 2 tablet, BID  [Held by provider] sulfamethoxazole-trimethoprim, 80 mg of trimethoprim, Daily  sulfur hexafluoride microsphr, 2 mL, Once in imaging  sulfur hexafluoride microsphr, 2 mL, Once in imaging  tacrolimus, 0.5 mg, q12h TRICIA  [Held by provider] traZODone, 25 mg, Nightly  valGANciclovir, 450 mg, q48h      dexmedeTOMIDine, Last Rate: 0.2 mcg/kg/hr (03/29/24 1217)  heparin, Last Rate: Stopped (03/29/24 0700)  heparin Impella Purge 25 units/mL in 500 mL D5W, Last Rate: 10 mL/hr (03/29/24 1200)  lactated Ringer's, Last Rate: 5 mL/hr (03/29/24 1200)  PrismaSol 4/2.5      calcium gluconate, 1 g, q6h PRN  calcium gluconate, 2 g, q6h PRN  dextrose, 25 g, q15 min PRN  dextrose, 25 g, q15 min PRN   Or  glucagon, 1 mg, q15 min PRN  dextrose, 25 g, q15 min PRN   Or  glucagon, 1 mg, q15 min PRN  diphenhydrAMINE, 25 mg, q5 min  PRN  glucagon, 1 mg, q15 min PRN  HYDROmorphone, 0.2 mg, q4h PRN  artificial tears, 2 drop, PRN  magnesium sulfate, 2 g, q6h PRN  magnesium sulfate, 4 g, q6h PRN  microfibrllar collagen, , PRN  mupirocin, , BID PRN  naloxone, 0.2 mg, q5 min PRN  ondansetron, 4 mg, q4h PRN  oxyCODONE, 5 mg, q4h PRN  sodium chloride, 1 spray, 4x daily PRN        ALLERGY:  Allergies   Allergen Reactions    Dapagliflozin GI bleeding and Bleeding     UTI    Urinary tract infection    Empagliflozin Unknown    Myfortic [Mycophenolate Sodium] GI Upset    Topiramate Nausea Only and Nausea/vomiting    Latex Rash       LABS:  Results for orders placed or performed during the hospital encounter of 02/15/24 (from the past 24 hour(s))   Blood Gas Arterial Full Panel   Result Value Ref Range    POCT pH, Arterial 7.38 7.38 - 7.42 pH    POCT pCO2, Arterial 39 38 - 42 mm Hg    POCT pO2, Arterial 446 (H) 85 - 95 mm Hg    POCT SO2, Arterial 100 94 - 100 %    POCT Oxy Hemoglobin, Arterial 95.8 94.0 - 98.0 %    POCT Hematocrit Calculated, Arterial 26.0 (L) 36.0 - 46.0 %    POCT Sodium, Arterial 128 (L) 136 - 145 mmol/L    POCT Potassium, Arterial 4.7 3.5 - 5.3 mmol/L    POCT Chloride, Arterial 100 98 - 107 mmol/L    POCT Ionized Calcium, Arterial 1.13 1.10 - 1.33 mmol/L    POCT Glucose, Arterial 138 (H) 74 - 99 mg/dL    POCT Lactate, Arterial 0.7 0.4 - 2.0 mmol/L    POCT Base Excess, Arterial -1.8 -2.0 - 3.0 mmol/L    POCT HCO3 Calculated, Arterial 23.1 22.0 - 26.0 mmol/L    POCT Hemoglobin, Arterial 8.8 (L) 12.0 - 16.0 g/dL    POCT Anion Gap, Arterial 10 10 - 25 mmo/L    Patient Temperature 37.0 degrees Celsius    FiO2 50 %   Blood Gas Arterial Full Panel   Result Value Ref Range    POCT pH, Arterial 7.38 7.38 - 7.42 pH    POCT pCO2, Arterial 37 (L) 38 - 42 mm Hg    POCT pO2, Arterial 408 (H) 85 - 95 mm Hg    POCT SO2, Arterial 100 94 - 100 %    POCT Oxy Hemoglobin, Arterial 95.9 94.0 - 98.0 %    POCT Hematocrit Calculated, Arterial 26.0 (L) 36.0 - 46.0  %    POCT Sodium, Arterial 129 (L) 136 - 145 mmol/L    POCT Potassium, Arterial 4.2 3.5 - 5.3 mmol/L    POCT Chloride, Arterial 103 98 - 107 mmol/L    POCT Ionized Calcium, Arterial      POCT Glucose, Arterial 123 (H) 74 - 99 mg/dL    POCT Lactate, Arterial 0.6 0.4 - 2.0 mmol/L    POCT Base Excess, Arterial -2.9 (L) -2.0 - 3.0 mmol/L    POCT HCO3 Calculated, Arterial 21.9 (L) 22.0 - 26.0 mmol/L    POCT Hemoglobin, Arterial 8.5 (L) 12.0 - 16.0 g/dL    POCT Anion Gap, Arterial 8 (L) 10 - 25 mmo/L    Patient Temperature 37.0 degrees Celsius    FiO2 40 %   Blood Gas Arterial Full Panel   Result Value Ref Range    POCT pH, Arterial 7.36 (L) 7.38 - 7.42 pH    POCT pCO2, Arterial 42 38 - 42 mm Hg    POCT pO2, Arterial 396 (H) 85 - 95 mm Hg    POCT SO2, Arterial 100 94 - 100 %    POCT Oxy Hemoglobin, Arterial 95.2 94.0 - 98.0 %    POCT Hematocrit Calculated, Arterial 27.0 (L) 36.0 - 46.0 %    POCT Sodium, Arterial 129 (L) 136 - 145 mmol/L    POCT Potassium, Arterial 4.5 3.5 - 5.3 mmol/L    POCT Chloride, Arterial 101 98 - 107 mmol/L    POCT Ionized Calcium, Arterial 1.11 1.10 - 1.33 mmol/L    POCT Glucose, Arterial 135 (H) 74 - 99 mg/dL    POCT Lactate, Arterial 0.6 0.4 - 2.0 mmol/L    POCT Base Excess, Arterial -1.7 -2.0 - 3.0 mmol/L    POCT HCO3 Calculated, Arterial 23.7 22.0 - 26.0 mmol/L    POCT Hemoglobin, Arterial 8.9 (L) 12.0 - 16.0 g/dL    POCT Anion Gap, Arterial 9 (L) 10 - 25 mmo/L    Patient Temperature 37.0 degrees Celsius    FiO2 40 %   Blood Gas Arterial Full Panel   Result Value Ref Range    POCT pH, Arterial 7.34 (L) 7.38 - 7.42 pH    POCT pCO2, Arterial 41 38 - 42 mm Hg    POCT pO2, Arterial 383 (H) 85 - 95 mm Hg    POCT SO2, Arterial 100 94 - 100 %    POCT Oxy Hemoglobin, Arterial 95.7 94.0 - 98.0 %    POCT Hematocrit Calculated, Arterial 25.0 (L) 36.0 - 46.0 %    POCT Sodium, Arterial 132 (L) 136 - 145 mmol/L    POCT Potassium, Arterial 4.1 3.5 - 5.3 mmol/L    POCT Chloride, Arterial 104 98 - 107 mmol/L     POCT Ionized Calcium, Arterial 1.04 (L) 1.10 - 1.33 mmol/L    POCT Glucose, Arterial 120 (H) 74 - 99 mg/dL    POCT Lactate, Arterial 0.5 0.4 - 2.0 mmol/L    POCT Base Excess, Arterial -3.4 (L) -2.0 - 3.0 mmol/L    POCT HCO3 Calculated, Arterial 22.1 22.0 - 26.0 mmol/L    POCT Hemoglobin, Arterial 8.4 (L) 12.0 - 16.0 g/dL    POCT Anion Gap, Arterial 10 10 - 25 mmo/L    Patient Temperature 37.0 degrees Celsius    FiO2 40 %   Blood Gas Arterial Full Panel   Result Value Ref Range    POCT pH, Arterial 7.40 7.38 - 7.42 pH    POCT pCO2, Arterial 39 38 - 42 mm Hg    POCT pO2, Arterial 393 (H) 85 - 95 mm Hg    POCT SO2, Arterial 100 94 - 100 %    POCT Oxy Hemoglobin, Arterial 94.7 94.0 - 98.0 %    POCT Hematocrit Calculated, Arterial 27.0 (L) 36.0 - 46.0 %    POCT Sodium, Arterial 130 (L) 136 - 145 mmol/L    POCT Potassium, Arterial 4.8 3.5 - 5.3 mmol/L    POCT Chloride, Arterial 102 98 - 107 mmol/L    POCT Ionized Calcium, Arterial 1.10 1.10 - 1.33 mmol/L    POCT Glucose, Arterial 141 (H) 74 - 99 mg/dL    POCT Lactate, Arterial 0.7 0.4 - 2.0 mmol/L    POCT Base Excess, Arterial -0.5 -2.0 - 3.0 mmol/L    POCT HCO3 Calculated, Arterial 24.2 22.0 - 26.0 mmol/L    POCT Hemoglobin, Arterial 8.9 (L) 12.0 - 16.0 g/dL    POCT Anion Gap, Arterial 9 (L) 10 - 25 mmo/L    Patient Temperature 37.0 degrees Celsius    FiO2 40 %   Creatine Kinase   Result Value Ref Range    Creatine Kinase 85 0 - 215 U/L   Heparin Assay, UFH   Result Value Ref Range    Heparin Unfractionated 0.8 See Comment Below for Therapeutic Ranges IU/mL   POCT GLUCOSE   Result Value Ref Range    POCT Glucose 118 (H) 74 - 99 mg/dL   Blood Gas Arterial Full Panel   Result Value Ref Range    POCT pH, Arterial 7.39 7.38 - 7.42 pH    POCT pCO2, Arterial 39 38 - 42 mm Hg    POCT pO2, Arterial 367 (H) 85 - 95 mm Hg    POCT SO2, Arterial 100 94 - 100 %    POCT Oxy Hemoglobin, Arterial 95.3 94.0 - 98.0 %    POCT Hematocrit Calculated, Arterial 25.0 (L) 36.0 - 46.0 %     POCT Sodium, Arterial 132 (L) 136 - 145 mmol/L    POCT Potassium, Arterial 4.1 3.5 - 5.3 mmol/L    POCT Chloride, Arterial 102 98 - 107 mmol/L    POCT Ionized Calcium, Arterial 1.05 (L) 1.10 - 1.33 mmol/L    POCT Glucose, Arterial 133 (H) 74 - 99 mg/dL    POCT Lactate, Arterial 0.5 0.4 - 2.0 mmol/L    POCT Base Excess, Arterial -1.2 -2.0 - 3.0 mmol/L    POCT HCO3 Calculated, Arterial 23.6 22.0 - 26.0 mmol/L    POCT Hemoglobin, Arterial 8.3 (L) 12.0 - 16.0 g/dL    POCT Anion Gap, Arterial 11 10 - 25 mmo/L    Patient Temperature 37.0 degrees Celsius    FiO2 40 %   Heparin Assay, UFH   Result Value Ref Range    Heparin Unfractionated 1.0 See Comment Below for Therapeutic Ranges IU/mL   Reticulocytes   Result Value Ref Range    Retic % 5.3 (H) 0.5 - 2.0 %    Retic Absolute 0.138 (H) 0.018 - 0.083 x10*6/uL    Reticulocyte Hemoglobin 33 28 - 38 pg    Immature Retic fraction 35.0 (H) <=16.0 %   Haptoglobin   Result Value Ref Range    Haptoglobin <10 (L) 37 - 246 mg/dL   D-dimer, Non VTE   Result Value Ref Range    D-Dimer Non VTE, Quant (ng/mL FEU) 48,549 (H) <=500 ng/mL FEU   CBC and Auto Differential   Result Value Ref Range    WBC 7.9 4.4 - 11.3 x10*3/uL    nRBC 12.1 (H) 0.0 - 0.0 /100 WBCs    RBC 2.62 (L) 4.00 - 5.20 x10*6/uL    Hemoglobin 8.2 (L) 12.0 - 16.0 g/dL    Hematocrit 24.0 (L) 36.0 - 46.0 %    MCV 92 80 - 100 fL    MCH 31.3 26.0 - 34.0 pg    MCHC 34.2 32.0 - 36.0 g/dL    RDW 21.8 (H) 11.5 - 14.5 %    Platelets 39 (LL) 150 - 450 x10*3/uL    Neutrophils % 86.4 40.0 - 80.0 %    Immature Granulocytes %, Automated 6.2 (H) 0.0 - 0.9 %    Lymphocytes % 2.7 13.0 - 44.0 %    Monocytes % 3.8 2.0 - 10.0 %    Eosinophils % 0.8 0.0 - 6.0 %    Basophils % 0.1 0.0 - 2.0 %    Neutrophils Absolute 6.84 1.20 - 7.70 x10*3/uL    Immature Granulocytes Absolute, Automated 0.49 0.00 - 0.70 x10*3/uL    Lymphocytes Absolute 0.21 (L) 1.20 - 4.80 x10*3/uL    Monocytes Absolute 0.30 0.10 - 1.00 x10*3/uL    Eosinophils Absolute 0.06 0.00  - 0.70 x10*3/uL    Basophils Absolute 0.01 0.00 - 0.10 x10*3/uL   Lactate Dehydrogenase   Result Value Ref Range    LDH 2,276 (H) 84 - 246 U/L   Creatine Kinase   Result Value Ref Range    Creatine Kinase 86 0 - 215 U/L   Magnesium   Result Value Ref Range    Magnesium 2.44 (H) 1.60 - 2.40 mg/dL   Hepatic function panel   Result Value Ref Range    Albumin 2.9 (L) 3.4 - 5.0 g/dL    Bilirubin, Total 4.3 (H) 0.0 - 1.2 mg/dL    Bilirubin, Direct 1.5 (H) 0.0 - 0.3 mg/dL    Alkaline Phosphatase 150 (H) 33 - 110 U/L     (H) 7 - 45 U/L     (H) 9 - 39 U/L    Total Protein 5.0 (L) 6.4 - 8.2 g/dL   Basic Metabolic Panel   Result Value Ref Range    Glucose 145 (H) 74 - 99 mg/dL    Sodium 134 (L) 136 - 145 mmol/L    Potassium 4.2 3.5 - 5.3 mmol/L    Chloride 101 98 - 107 mmol/L    Bicarbonate 26 21 - 32 mmol/L    Anion Gap 11 10 - 20 mmol/L    Urea Nitrogen 15 6 - 23 mg/dL    Creatinine 1.26 (H) 0.50 - 1.05 mg/dL    eGFR 58 (L) >60 mL/min/1.73m*2    Calcium 7.5 (L) 8.6 - 10.6 mg/dL   Phosphorus   Result Value Ref Range    Phosphorus 1.5 (L) 2.5 - 4.9 mg/dL   Morphology   Result Value Ref Range    RBC Morphology See Below     Polychromasia Mild     RBC Fragments Few     [place] Cells Many     Acanthocytes Few    Calcium, Ionized   Result Value Ref Range    POCT Calcium, Ionized 1.07 (L) 1.1 - 1.33 mmol/L   ECMO Arterial Blood Gas   Result Value Ref Range    POCT pH, Arterial ECMO 7.34 Reference range not established pH    POCT pCO2, Arterial ECMO 47 Reference range not established mm Hg    POCT pO2,  Arterial ECMO 449 Reference range not established mm Hg    POCT SO2, Arterial ECMO 100 Reference range not established %    POCT Oxy Hemoglobin, Arterial ECMO 94.4 Reference range not established %    POCT Base Excess, Arterial ECMO -0.8 Reference range not established mmol/L    POCT HCO3 Calculated, Arterial ECMO 25.4 Reference range not established mmol/L    Patient Temperature 37.0 degrees Celsius    FiO2 100 %   ECMO  Venous Blood Gas   Result Value Ref Range    POCT pH, Venous ECMO 7.30 Reference range not established pH    POCT pCO2, Venous ECMO 46 Reference range not established mm Hg    POCT pO2,  Venous  ECMO 50 Reference range not established mm Hg    POCT SO2, Venous ECMO 89 Reference range not established %    POCT Oxy Hemoglobin, Venous ECMO 84.6 Reference range not established %    POCT Base Excess, Venous ECMO -3.6 Reference range not established mmol/L    POCT HCO3 Calculated, Venous ECMO 22.6 Reference range not established mmol/L    Patient Temperature 37.0 degrees Celsius    FiO2 100 %   Heparin Assay, UFH   Result Value Ref Range    Heparin Unfractionated 1.0 See Comment Below for Therapeutic Ranges IU/mL   Blood Gas Arterial Full Panel   Result Value Ref Range    POCT pH, Arterial 7.37 (L) 7.38 - 7.42 pH    POCT pCO2, Arterial 46 (H) 38 - 42 mm Hg    POCT pO2, Arterial 362 (H) 85 - 95 mm Hg    POCT SO2, Arterial 100 94 - 100 %    POCT Oxy Hemoglobin, Arterial 94.4 94.0 - 98.0 %    POCT Hematocrit Calculated, Arterial 25.0 (L) 36.0 - 46.0 %    POCT Sodium, Arterial 130 (L) 136 - 145 mmol/L    POCT Potassium, Arterial 4.1 3.5 - 5.3 mmol/L    POCT Chloride, Arterial 102 98 - 107 mmol/L    POCT Ionized Calcium, Arterial 1.18 1.10 - 1.33 mmol/L    POCT Glucose, Arterial 133 (H) 74 - 99 mg/dL    POCT Lactate, Arterial 0.6 0.4 - 2.0 mmol/L    POCT Base Excess, Arterial 1.1 -2.0 - 3.0 mmol/L    POCT HCO3 Calculated, Arterial 26.6 (H) 22.0 - 26.0 mmol/L    POCT Hemoglobin, Arterial 8.3 (L) 12.0 - 16.0 g/dL    POCT Anion Gap, Arterial 6 (L) 10 - 25 mmo/L    Patient Temperature 37.0 degrees Celsius    FiO2 40 %   Tacrolimus level   Result Value Ref Range    Tacrolimus  7.4 <=15.0 ng/mL   Blood Gas Arterial Full Panel   Result Value Ref Range    POCT pH, Arterial 7.36 (L) 7.38 - 7.42 pH    POCT pCO2, Arterial 46 (H) 38 - 42 mm Hg    POCT pO2, Arterial 404 (H) 85 - 95 mm Hg    POCT SO2, Arterial 100 94 - 100 %    POCT Oxy  Hemoglobin, Arterial 94.1 94.0 - 98.0 %    POCT Hematocrit Calculated, Arterial 26.0 (L) 36.0 - 46.0 %    POCT Sodium, Arterial 131 (L) 136 - 145 mmol/L    POCT Potassium, Arterial 4.3 3.5 - 5.3 mmol/L    POCT Chloride, Arterial 103 98 - 107 mmol/L    POCT Ionized Calcium, Arterial 1.12 1.10 - 1.33 mmol/L    POCT Glucose, Arterial 131 (H) 74 - 99 mg/dL    POCT Lactate, Arterial 0.6 0.4 - 2.0 mmol/L    POCT Base Excess, Arterial 0.4 -2.0 - 3.0 mmol/L    POCT HCO3 Calculated, Arterial 26.0 22.0 - 26.0 mmol/L    POCT Hemoglobin, Arterial 8.5 (L) 12.0 - 16.0 g/dL    POCT Anion Gap, Arterial 6 (L) 10 - 25 mmo/L    Patient Temperature 37.0 degrees Celsius    FiO2 40 %    Ventilator Mode CPAP    Blood Gas Arterial Full Panel   Result Value Ref Range    POCT pH, Arterial 7.43 (H) 7.38 - 7.42 pH    POCT pCO2, Arterial 33 (L) 38 - 42 mm Hg    POCT pO2, Arterial 332 (H) 85 - 95 mm Hg    POCT SO2, Arterial 100 94 - 100 %    POCT Oxy Hemoglobin, Arterial 93.9 (L) 94.0 - 98.0 %    POCT Hematocrit Calculated, Arterial 23.0 (L) 36.0 - 46.0 %    POCT Sodium, Arterial 136 136 - 145 mmol/L    POCT Potassium, Arterial 3.6 3.5 - 5.3 mmol/L    POCT Chloride, Arterial 109 (H) 98 - 107 mmol/L    POCT Ionized Calcium, Arterial 1.01 (L) 1.10 - 1.33 mmol/L    POCT Glucose, Arterial 116 (H) 74 - 99 mg/dL    POCT Lactate, Arterial 0.5 0.4 - 2.0 mmol/L    POCT Base Excess, Arterial -2.1 (L) -2.0 - 3.0 mmol/L    POCT HCO3 Calculated, Arterial 21.9 (L) 22.0 - 26.0 mmol/L    POCT Hemoglobin, Arterial 7.8 (L) 12.0 - 16.0 g/dL    POCT Anion Gap, Arterial 9 (L) 10 - 25 mmo/L    Patient Temperature 37.0 degrees Celsius    FiO2 40 %   Creatine Kinase   Result Value Ref Range    Creatine Kinase 89 0 - 215 U/L   CBC   Result Value Ref Range    WBC 7.0 4.4 - 11.3 x10*3/uL    nRBC 11.7 (H) 0.0 - 0.0 /100 WBCs    RBC 2.59 (L) 4.00 - 5.20 x10*6/uL    Hemoglobin 7.9 (L) 12.0 - 16.0 g/dL    Hematocrit 23.4 (L) 36.0 - 46.0 %    MCV 90 80 - 100 fL    MCH 30.5  26.0 - 34.0 pg    MCHC 33.8 32.0 - 36.0 g/dL    RDW 22.1 (H) 11.5 - 14.5 %    Platelets 62 (L) 150 - 450 x10*3/uL     *Note: Due to a large number of results and/or encounters for the requested time period, some results have not been displayed. A complete set of results can be found in Results Review.        ASSESSMENT AND PLAN:    Ms. Yimi Bowles is a 34 y.o. female who underwent a kidney transplant surgery  on 3/31/2022 (Heart).    Principal Problem:    Cardiogenic shock (CMS/HCC)  Active Problems:    Heart transplant recipient (CMS/[facility])    ESRD (end stage renal disease) on dialysis (CMS/[facility])    HIRAM (acute kidney injury) (CMS/[facility])    Acute passive congestion of liver    Hyponatremia    Iron deficiency anemia    Abdominal pain    Cardiac transplant rejection (CMS/[facility])    Acute combined systolic (congestive) and diastolic (congestive) heart failure (CMS/HCC)      CRRT Management Note:    - Patient was seen and examined while on CVVH   - Lab was reviewed  - CVVH order was reviewed  -Discussed with primary team  -Discussed with RN   - Will continue CVVH for now  -Daily evaluation for indications for CVVH and will convert it to regular IHD once the patient is more hemodynamically stable.    Heart and Kidney transplant Candidacy :   Will meet the OPTN eligibility criteria for kidney transplant on 3/29/24 for sustained HIRAM over 6 weeks. Pre tx work up - per heart tx team.   Note:  GFR 2/17/24=23  CVVH started 2/19 and has remained on CVVH still  She is approved for CANDIDATE PENDING HEART TRANSPLANT APPROVAL at kidney transplant selection committee 3/28/24    [Sustained acute kidney injury : At least one of the following, or a combination of both of the following, for the last 6 weeks:    That the candidate has been on dialysis at least once every 7 days.    That the candidate has a measured or calculated CrCl or GFR less than or equal to 25 mL/min at least once every 7 days]      * Case was discussed with primary  team.  For questions, please contact transplant nephrology page x [phone]    Russ Bergeron    Transplant Nephrologist

## 2024-03-29 NOTE — PROGRESS NOTES
CTICU Progress Note  Charla Bowles/[id number]    Admit Date: 2/15/2024  Hospital Length of Stay: 43   ICU Length of Stay: 1d 12h   CT SURGEON:     SUBJECTIVE:   Tachypnea overnight, switched from CPAP to AC/VC.    MEDICATIONS  Infusions:  dexmedeTOMIDine, Last Rate: 0.2 mcg/kg/hr (03/29/24 0700)  heparin, Last Rate: Stopped (03/29/24 0700)  heparin Impella Purge 25 units/mL in 500 mL D5W, Last Rate: 10 mL/hr (03/29/24 0700)  lactated Ringer's, Last Rate: 5 mL/hr (03/29/24 0700)  PrismaSol 4/2.5      Scheduled:  [Held by provider] acetaminophen, 650 mg, q4h  calcitriol, 0.5 mcg, Daily  cephalexin, 500 mg, q12h TRICIA  darbepoetin floyd, 100 mcg, q14 days  ferrous sulfate (325 mg ferrous sulfate), 65 mg of iron, Daily with breakfast  folic acid, 1 mg, Daily  insulin lispro, 0-15 Units, q4h  levothyroxine, 200 mcg, Daily  lidocaine, 1 patch, Daily  multivitamin with minerals, 1 tablet, Daily  [Held by provider] mycophenolate, 180 mg, BID  nystatin, 5 mL, q6h  oxygen, , Continuous - Inhalation  pantoprazole, 40 mg, BID  perflutren protein A microsphere, 0.5 mL, Once in imaging  polyethylene glycol, 17 g, BID  predniSONE, 10 mg, Daily  sennosides-docusate sodium, 2 tablet, BID  [Held by provider] sulfamethoxazole-trimethoprim, 80 mg of trimethoprim, Daily  sulfur hexafluoride microsphr, 2 mL, Once in imaging  tacrolimus, 0.5 mg, q12h TRICIA  [Held by provider] traZODone, 25 mg, Nightly  valGANciclovir, 450 mg, q48h      PRN:  calcium gluconate, 1 g, q6h PRN  calcium gluconate, 2 g, q6h PRN  dextrose, 25 g, q15 min PRN  dextrose, 25 g, q15 min PRN   Or  glucagon, 1 mg, q15 min PRN  dextrose, 25 g, q15 min PRN   Or  glucagon, 1 mg, q15 min PRN  diphenhydrAMINE, 25 mg, q5 min PRN  glucagon, 1 mg, q15 min PRN  HYDROmorphone, 0.2 mg, q4h PRN  artificial tears, 2 drop, PRN  magnesium sulfate, 2 g, q6h PRN  magnesium sulfate, 4 g, q6h PRN  microfibrllar collagen, , PRN  mupirocin, , BID PRN  naloxone, 0.2 mg, q5 min  "PRN  ondansetron, 4 mg, q4h PRN  oxyCODONE, 5 mg, q4h PRN  sodium chloride, 1 spray, 4x daily PRN        PHYSICAL EXAM:   Visit Vitals  BP (!) 112/105   Pulse 77   Temp 36.7 °C (98.1 °F) (Temporal)   Resp 10   Ht 1.549 m (5' 0.98\")   Wt 96 kg (211 lb 10.3 oz)   SpO2 100%   BMI 40.01 kg/m²   OB Status Hysterectomy   Smoking Status Never   BSA 2.03 m²     Wt Readings from Last 5 Encounters:   03/26/24 96 kg (211 lb 10.3 oz)   12/07/23 92.1 kg (203 lb)   12/01/23 93 kg (205 lb)   11/29/23 92.9 kg (204 lb 12.8 oz)   11/09/23 91.3 kg (201 lb 3.2 oz)     INTAKE/OUTPUT:  I/O last 3 completed shifts:  In: 2970.6 (30.9 mL/kg) [I.V.:2360.6 (24.6 mL/kg); Blood:350; NG/GT:210; IV Piggyback:50]  Out: 4022 (41.9 mL/kg) [Urine:10 (0 mL/kg/hr); Other:4012]  Weight: 96 kg          Vent settings:  Vent Mode: Volume control/assist control  FiO2 (%):  [40 %-50 %] 40 %  S RR:  [10] 10  S VT:  [300 mL-400 mL] 400 mL  PEEP/CPAP (cm H2O):  [5 cm H20-8 cm H20] 5 cm H20  AK SUP:  [5 cm H20-8 cm H20] 8 cm H20  MAP (cm H2O):  [5-114] 5    Physical Exam:   - CONSTITUTION: young appearing female intubated on ECMO with impella  - NEUROLOGIC: sedated but following commands, no obvious focal deficits. RASS 0.  - CARDIOVASCULAR: NSR. R fem VA ECMO with distal perfusion catheter, no bleeding at site. R axillary impella without bleeding, P7.   - RESPIRATORY: Intubated.  Course breath sounds. Copious thin, pink tinged secretions.  - GI: soft, hypoactive BS  - : anuric, CVVH via ECMO circuit  - EXTREMITIES: warm, monophasic pulses in all extremities  - SKIN: left groin area of breakdown. Scattered ecchymosis   - PSYCHIATRIC: JOSE    Daily Risk Screen  Intubated: plan to attempt to wean to extubate  Central line: Remove if adequate access     Images: Reviewed    Invasive Hemodynamics:    Most Recent Range Past 24hrs   BP (Art) 73/68 Arterial Line BP 1  Min: 73/68  Max: 115/106   MAP(Art) 70 mmHg Arterial Line MAP 1 (mmHg)   Min: 70 mmHg  Max: 109 mmHg "   RA/CVP   No data recorded   PA 41/34 No data recorded   PA(mean) 37 mmHg No data recorded   CO 5.5 L/min No data recorded   CI 2.8 L/min/m2 No data recorded   Mixed Venous 94 % SVO2 (%)  Min: 83.8 %  Max: 94 %   SVR  1115 (dyne*sec)/cm5 No data recorded         Assessment/Plan   34F with a PMHx sig for stage D HFrEF (PPCM) s/p ICD s/p CardioMEMs, hypothyroidism 2/2 thyroidectomy, obesity s/p gastric bypass (2017), and SLE who is s/p OHT (3/31/2022) with a post-OHT course complicated by RIJ/RUE DVTs, leukopenia, left groin seroma, and asymptomatic 2R ACR (11/2022) treated with steroids, s/p total hysterectomy (2023), Flu A (1/2024).     Originally presented to [facility] ED on 2/15/24 with complaints of N/V x 3 days and inability to keep medications down. Found to have acute systolic heart failure with primary graft failure and cardiogenic shock. Treated for suspected acute cellular vs. acute antibody mediated rejection; however, multiple cardiac biopsies negative for signs of rejection or other causes of graft failure. Course has been complicated by multiple MCS devices for refractory cardiogenic shock (right femoral IABP: 2/18/24 - 3/1/24, Left femoral VA ECMO: 2/19/24 - 2/29/24, Right axillary impella 5.5: 3/1/24 - remains in place, 2nd right femoral VA ECMO: 3/27/24 - remains in place), ARF requiring RRT, acute liver injury, intubation due to volume overload in setting of pulmonary edema, severe hemolysis 2/2 to impella malposition, mild CMV viremia, bilateral provoked IJ DVTs, multiple episodes of epistaxis & coagulopathies requiring ongoing blood product transfusions.        Transferred to CTICU on 3/27/24 following right femoral VA ECMO placement due to worsening cardiogenic shock and multi-organ failure despite Impella 5.5 support and multiple inotropes.       Plan:  NEURO: No history. Previously neuro intact with acute pain and intermittent insomnia. Alerted mental status in setting of cardiogenic shock 3/27.  Currently intubated and sedated on precedex infusion but follows commands. -->  - Serial neuro and pain assessments  - Continue precedex for goal RASS 0 to -1  - hold tylenol with liver dysfunction  - continue lidoderm patch for back pain  - Continue oxy to 5mg Q4 PRN  - continue dilaudid prn for now while intubated  - holding trazodone for sleep while on precedex  - PT/OT Consult  - CAM ICU score qshift. Sleep/wake cycle hygiene     ENT: Multiple episodes of epistaxis with interventions by ENT.  Most recently in OR 3/27 with L nare packed.  No current bleeding.  - appreciate ENT recs  - keflex while packing in place  - Prn ocean spray and mupiricin for dry nasal passages     Cardiac: Patient has a history of heart transplant in March of 2022 with primary graft dysfunction treated for acute cellular and antibody rejection without improvement with negative biopsy with multiple MCS devices for refractory cardiogenic shock (right femoral IABP: 2/18/24 - 3/1/24, Left femoral VA ECMO: 2/19/24 - 2/29/24, Right axillary impella 5.5: 3/1/24 - remains in place, 2nd right femoral VA ECMO: 3/27/24 - remains in place). Elevated LDH with multiple attempts at repositioning impella. No inotrope/vasopressors. Non-pulsatile flow. ECMO ~3.5L flow/100%/sweep 1, impella 5.5 P7 with 3.2L flow with normal lactate and improving end organ function. NSR and normotensive.   -->  - Maintain goal MAP 70-90  - continue full BiV support with impella and ECMO. No plan for attempts at weaning. Will be presented within the next week for kidney/heart transplant  - Echo today for impella positioning  - Impella increased from P6 to P7 to attempt to improve pulmonary edema; suction events with anything above P8  - Continue to hold home rosuvastatin 40mg daily with elevated LFTs  - Hold home amlodipine    Vascular: C/f LLE ischemia (previous ECMO site) with loss of pulses, now with monophasic pulses. Patent flow on LE arterial and venous US 3/27. CK  normal  - appreciate vascular surgery following  - stop trending CK     PULM: No history. Intubated multiple times throughout hospital course for procedures; intubated 3/27 for OR. Again with acute respiratory failure due to acute pulmonary edema, continues to have copious thin frothy secretions. Tolerated CPAP yesterday, low Vt today. Remains on AC/VC. FIO2 40% on vent.  -->  - CXR daily  - pull negative on CVVH  - reattempt cpap trial later today with goal extubation  - Maintain SPO2 > 92%     GI: History of gastric bypass and MMF colitis. Shock liver originally resolved; most recently elevated r/t likely congestive hepatopathy that is improving on ECMO. Previously on oral diet. OG in place at present. Last BM 3/23 per documentation; intermittent constipation. Abdominal pain improved with reduced myfortic dosing. Previous c/f GI, likely blood from epistaxis; no current evidence of GI bleeding. H. Pylori negative, fecal calprotectin 380, fecal lactoferrin positive on 3/23 workup -->  - Continue multivitamin, calcium carbonate-vitamin D3 500mg-5mcg, vitamin b-12 500 mcg  - Reduce PPI to daily  - avoiding placing dobhoff with epistaxis  - Start enteral feeding per nutrition orders  - Continue miralax BID & senna-colace BID  - Dulcolax suppository today  - Reach out to GI with stool results    : No history, baseline Cr 1.2-1.3. HIRAM originally on CVVH then with renal recovery but then again worsened and now dialysis dependent and failed diuretic challenges. Hypervolemic on CVVH, tolerating volume removal. -->  - RFP as clinically indicated, replete electrolytes per CTICU protocol.   - Bladder scan daily  - Continue CVVH for net negative at tolerated  - Nephrology following  - Transplant nephrology following, approved from kidney transplant pending heart transplant approval. Requesting left groin vessels be avoided pending potential transplant     ENDO: History of hypothyroidism and prediabetes. TSH elevated  originally to malabsorption then with dosing adjusted by endo; TSH (3/17): 20.74, T4 1.11, T3: 1.4. Euglycemic. -->  - Maintain BG <180 with hypoglycemia protocol  - Continue SSI  - Continue Synthroid to 200mcg daily  - Endocrine following     HEME: History of iron deficiency anemia and remote DVT; again with acute DVT LIJ and SVT left cephalic vein and right cephalic vein. Acute blood loss anemia with repeated transfusions improved off systemic AC. 1 pRBC overnight and incremented. Thrombocytopenia uptrended after 10pk plts 3/27; last PF4 negative 3/25.  Coagulopathy in setting of congestive hepatopathy improving. No products in the last 24 hours. -->    - CBC BID  - INR daily  - Continue ferrous sulfate daily  - holding ASA for CAD prevention with recent thrombocytopenia  - Holding heparin infusion with supertherapeutic heparin assay  - continue heparin purge for impella  - SCDs and for DVT prophylaxis  - Aranesp every 2 weeks  - Last type and screen: 3/28     Rejection/prophylaxis: S/p heart transplant 3/31/2022. Induction basiliximab. Donor HCV -, toxo -/-, CMV -/+. Mild ACR with +CD4 and negative HLAs however clinical presentation concern for acute cellular vs AMR rejection/stuttering rejection completed courses of thymo/PLEX/IVIG without clinical improvement.   - Steroids: Continue 10mg PO prednisone daily  - Tacrolimus: 0.5mg liquid @ 0630 and 1 mg liquid @ 1830 w/ daily levels drawn @ 0600  - Tacrolimus goal troughs: 8-10  - Myfortic acid: 180mg BID, holding as unable to be crushed. Hx of MMF colitis.   - Antifungals: nystatin 500,000units Q6  - Antivirals: valcyte 450mg Q48hrs   - Anti PCP & Toxoplasmosis: holding Bactrim SS daily due to thrombocytopenia     ID: Afebrile. C/f previous yeast infection. BC 3/27 NGTD.  MRSA negative 3/28. -->  - Trend temp q4h  - keflex while nasal packing in place  - f/u blood cx     Skin: No active skin issues. Left groin wound from previous ecmo with wound consulted. Wound  cx with NG 3/15.   - Preventative Mepilex dressings in place on sacrum and heels  - Change preventative Mepilex weekly or more frequently as indicated (when moist/soiled)   - Every shift skin assessment per nursing and weekly ICU skin rounds  - Moisture barrier to be applied with christopher care  - Active skin problems addressed with nursing on daily rounds  - wound recs from note 3/15 ordered      Proph:  SCDs  Heparin held  PPI     G: Lines  RIJ Trialysis - 3/5. Will evaluate replacing. Limited options for replacement with DVTs.  Will consider line holiday if other access able to be obtained     Restraints: The indications and risks/benefits of non-violent/non self-destructive restraints were discussed and are indicated for the safety of the patient.     Code status: Full Code     I personally spent 55 minutes of critical care time directly and personally managing the patient exclusive of separately billable procedures.     A,B,C,D,E,F,G: reviewed     Discussed with Dr. Mehta  Dispo: CTICU care for now.    CTICU TEAM PHONE [phone]

## 2024-03-29 NOTE — PROGRESS NOTES
"3/29 Review:   3/27 @ los 41 returned to CTICU for \" requiring escalation to mechanical circulatory support today with re-initiation of VA ECMO.\" Supported by Impella 5.5. ESRD & on dialysis.  Committee Board following for another heart transplant/ +kidney.  Next committee meeting 4/2.     Claribel Diaz (LSW, MSW)     "

## 2024-03-30 NOTE — CARE PLAN
Problem: Heart Failure  Goal: Improved gas exchange this shift  Outcome: Progressing  Goal: Reduction in peripheral edema within 24 hours  Outcome: Progressing     Problem: Safety - Medical Restraint  Goal: Remains free of injury from restraints (Restraint for Interference with Medical Device)  Outcome: Progressing  Goal: Free from restraint(s) (Restraint for Interference with Medical Device)  Outcome: Progressing      3y2m, female, well appearing, vaccines UTD, BIB mother, c/o vomiting and diarrhea since thursday. Mother deny fever. Pt urinating regular amount as per mother.

## 2024-03-30 NOTE — PROGRESS NOTES
Charla Bowles is a 34 y.o. female on day 44 of admission presenting with Cardiogenic shock (CMS/HCC).      Assessment/Plan   Principal Problem:    Cardiogenic shock (CMS/HCC)  Active Problems:    Heart transplant recipient (CMS/HCC)    ESRD (end stage renal disease) on dialysis (CMS/HCC)    HIRAM (acute kidney injury) (CMS/HCC)    Acute passive congestion of liver    Hyponatremia    Iron deficiency anemia    Abdominal pain    Systolic congestive heart failure (CMS/HCC)    Cardiac transplant rejection (CMS/HCC)    Acute combined systolic (congestive) and diastolic (congestive) heart failure (CMS/HCC)    Patient requires ECMO support. Drainage cannula site, cannula, pump, oxygenator, return cannula and return cannula site clinically inspected. RPM and sweep reviewed and adjusted appropriate to clinical context. Anticoagulation status and intensity discussed. Plan for weaning, next clinical steps discussed with team and family. Discussed with cardiothoracic surgeon, perfusion, palliative care and physical/occupational therapy    ECMO Indication: CS  ECMO Cannula Configuration: fem-fem  ECMO circuit run from drainage cannula to pump to oxygenator to return cannula: y  Presence of cannula bleeding: n  Presence of oxygenator clot: n  Pre/post PaO2 adequate: y  LV Unloading/Pulse Pressure: impella  Distal Perfusion: ok  Anticoagulation Plan/Monitoring: heparin  Mobility Plan/Candidacy: no  Path to decannulation/anticipated decannulation date:unclear      dexmedeTOMIDine, 0.1-1.5 mcg/kg/hr, Last Rate: 0.7 mcg/kg/hr (03/30/24 1100)  heparin, 0-4,000 Units/hr, Last Rate: 300 Units/hr (03/30/24 1100)  heparin Impella Purge 25 units/mL in 500 mL D5W, 10 mL/hr, Last Rate: 10 mL/hr (03/30/24 1100)  lactated Ringer's, 5 mL/hr, Last Rate: 5 mL/hr (03/30/24 1100)  PrismaSol 4/2.5, 25 mL/kg/hr          Vent Mode: Pressure support  FiO2 (%):  [40 %] 40 %  S RR:  [10] 10  S VT:  [400 mL] 400 mL  PEEP/CPAP (cm H2O):  [8 cm H20-10  cm H20] 10 cm H20  GA SUP:  [10 cm H20] 10 cm H20  MAP (cm H2O):  [11-22] 11              I, as the attending critical care physician, have personally reviewed the recent patient history, physical exam, vital signs, significant labs, medications, imaging, and ECMO settings, oxygenator, and flows of this critically ill patient. Using this information I have and will continue to actively manage this critically ill patient's extracorporeal membrane oxygenation (ECMO)/extracorporeal life support (ECLS).   Quang Mehta MD

## 2024-03-30 NOTE — PROGRESS NOTES
[facility]  ADVANCED HEART FAILURE AND TRANSPLANT CARDIOLOGY   Charla Bowles/[id number]    Admit Date: 2/15/2024  Hospital Length of Stay: 44   ICU Length of Stay: 2d 16h   Primary Service: CTICU      Charla Bowles is a 34 y.o. female on day 44 of admission presenting with Cardiogenic shock (CMS/HCC).    Subjective   Patient remains hemodynamically supported with VA ECMO and Impella. She failed CPAP yesterday, remains intubated with PEEP increased to 10, CXR with pulmonary edema. She is anemic and thrombocytopenic this morning while on heparin infusion however no obvious sources of blood loss.       Objective     Physical Exam  Constitutional:       Appearance: She is ill-appearing.   HENT:      Head: Normocephalic.      Comments: Not actively bleeding      Mouth/Throat:      Mouth: Mucous membranes are dry.      Pharynx: Oropharynx is clear. No oropharyngeal exudate or posterior oropharyngeal erythema.   Eyes:      Extraocular Movements: Extraocular movements intact.      Conjunctiva/sclera: Conjunctivae normal.      Pupils: Pupils are equal, round, and reactive to light.   Neck:      Vascular: No JVD.   Cardiovascular:      Rate and Rhythm: Tachycardia present.      Comments: Right axillary impella in place ~45cm at hub (no hematoma), Right femoral VA ECMO in place with reperfusion catheter (site appears clean and dry). Dopplerable radial and ulnar pulses bilaterally. Unable to doppler LLE DP/PT/popliteal pulses.  Lower extremities warm.   Pulmonary:      Effort: Pulmonary effort is normal. No accessory muscle usage, respiratory distress or retractions.      Breath sounds: No wheezing, rhonchi or rales.      Comments: Intubated via ETT. Chest wall moving symmetrically, minimal inline secretions.    Abdominal:      General: Abdomen is flat. Bowel sounds are normal. There is no distension.      Palpations: Abdomen is soft. There is no mass.      Hernia: No hernia is  "present.      Comments: OG in place   Musculoskeletal:         General: No swelling or tenderness. Normal range of motion.      Cervical back: Full passive range of motion without pain.   Skin:     General: Skin is dry.      Capillary Refill: Capillary refill takes less than 2 seconds.      Coloration: Skin is not jaundiced.      Findings: Bruising and lesion present. No erythema, laceration, rash or wound.   Neurological:      General: No focal deficit present.      Mental Status: She is alert.      Comments: Sedated on Precedex. Drowsy but able to communicate and answer yes/no questions   Fcx4   MAEx4          Last Recorded Vitals  Blood pressure (!) 96/93, pulse 77, temperature 36.7 °C (98.1 °F), temperature source Core, resp. rate 25, height 1.549 m (5' 0.98\"), weight 96 kg (211 lb 10.3 oz), SpO2 100 %.  Intake/Output last 3 Shifts:  I/O last 3 completed shifts:  In: 1902.1 (19.8 mL/kg) [P.O.:100; I.V.:1117.1 (11.6 mL/kg); NG/GT:285; IV Piggyback:400]  Out: 7868 (82 mL/kg) [Other:7868]  Weight: 96 kg     Relevant Results  Scheduled medications  [Held by provider] acetaminophen, 650 mg, oral, q4h  bisacodyl, 10 mg, rectal, Daily  calcium carbonate-vitamin D3, 1 tablet, oral, Daily  cephalexin, 500 mg, oral, q12h TRICIA  cyanocobalamin, 500 mcg, oral, Daily  darbepoetin floyd, 100 mcg, subcutaneous, q14 days  ferrous sulfate (325 mg ferrous sulfate), 65 mg of iron, oral, Daily with breakfast  folic acid, 1 mg, oral, Daily  insulin lispro, 0-15 Units, subcutaneous, q4h  levothyroxine, 200 mcg, oral, Daily  lidocaine, 1 patch, transdermal, Daily  multivitamin with iron-minerals, 15 mL, oral, Daily  multivitamin with minerals, 1 tablet, oral, Daily  [Held by provider] mycophenolate, 180 mg, oral, BID  nystatin, 5 mL, Swish & Swallow, q6h  oxygen, , inhalation, Continuous - Inhalation  pantoprazole, 40 mg, intravenous, Daily  polyethylene glycol, 17 g, oral, BID  potassium phosphate, 21 mmol, intravenous, Once  potassium, " sodium phosphates, 1 packet, oral, q8h  predniSONE, 10 mg, oral, Daily  sennosides-docusate sodium, 2 tablet, oral, BID  [Held by provider] sulfamethoxazole-trimethoprim, 80 mg of trimethoprim, oral, Daily  tacrolimus, 0.5 mg, oral, q24h  tacrolimus, 1 mg, oral, q24h  [Held by provider] traZODone, 25 mg, oral, Nightly  valGANciclovir, 450 mg, oral, q48h      Continuous medications  dexmedeTOMIDine, 0.1-1.5 mcg/kg/hr, Last Rate: 0.7 mcg/kg/hr (03/30/24 1100)  heparin, 0-4,000 Units/hr, Last Rate: 300 Units/hr (03/30/24 1100)  heparin Impella Purge 25 units/mL in 500 mL D5W, 10 mL/hr, Last Rate: 10 mL/hr (03/30/24 1100)  lactated Ringer's, 5 mL/hr, Last Rate: 5 mL/hr (03/30/24 1100)  PrismaSol 4/2.5, 25 mL/kg/hr      PRN medications  PRN medications: calcium gluconate, calcium gluconate, dextrose, dextrose **OR** glucagon, dextrose **OR** glucagon, glucagon, hydrALAZINE, HYDROmorphone, artificial tears, magnesium sulfate, magnesium sulfate, microfibrllar collagen, mupirocin, naloxone, ondansetron, oxyCODONE, sodium chloride     Results for orders placed or performed during the hospital encounter of 02/15/24 (from the past 24 hour(s))   Creatine Kinase   Result Value Ref Range    Creatine Kinase 99 0 - 215 U/L   Calcium, Ionized   Result Value Ref Range    POCT Calcium, Ionized 1.08 (L) 1.1 - 1.33 mmol/L   CBC   Result Value Ref Range    WBC 8.2 4.4 - 11.3 x10*3/uL    nRBC 8.7 (H) 0.0 - 0.0 /100 WBCs    RBC 2.67 (L) 4.00 - 5.20 x10*6/uL    Hemoglobin 8.3 (L) 12.0 - 16.0 g/dL    Hematocrit 24.1 (L) 36.0 - 46.0 %    MCV 90 80 - 100 fL    MCH 31.1 26.0 - 34.0 pg    MCHC 34.4 32.0 - 36.0 g/dL    RDW 22.3 (H) 11.5 - 14.5 %    Platelets 65 (L) 150 - 450 x10*3/uL   Coagulation Screen   Result Value Ref Range    Protime 14.4 (H) 9.8 - 12.8 seconds    INR 1.3 (H) 0.9 - 1.1    aPTT 91 (H) 27 - 38 seconds   Hepatic function panel   Result Value Ref Range    Albumin 3.1 (L) 3.4 - 5.0 g/dL    Bilirubin, Total 4.3 (H) 0.0 - 1.2  mg/dL    Bilirubin, Direct 1.1 (H) 0.0 - 0.3 mg/dL    Alkaline Phosphatase 160 (H) 33 - 110 U/L    ALT 83 (H) 7 - 45 U/L     (H) 9 - 39 U/L    Total Protein 5.4 (L) 6.4 - 8.2 g/dL   Magnesium   Result Value Ref Range    Magnesium 2.46 (H) 1.60 - 2.40 mg/dL   Basic Metabolic Panel   Result Value Ref Range    Glucose 137 (H) 74 - 99 mg/dL    Sodium 134 (L) 136 - 145 mmol/L    Potassium 4.1 3.5 - 5.3 mmol/L    Chloride 101 98 - 107 mmol/L    Bicarbonate 25 21 - 32 mmol/L    Anion Gap 12 10 - 20 mmol/L    Urea Nitrogen 13 6 - 23 mg/dL    Creatinine 1.14 (H) 0.50 - 1.05 mg/dL    eGFR 65 >60 mL/min/1.73m*2    Calcium 7.7 (L) 8.6 - 10.6 mg/dL   Phosphorus   Result Value Ref Range    Phosphorus 1.1 (L) 2.5 - 4.9 mg/dL   Heparin Assay, UFH   Result Value Ref Range    Heparin Unfractionated 0.5 See Comment Below for Therapeutic Ranges IU/mL   Transthoracic Echo (TTE) Limited   Result Value Ref Range    LV biplane EF 16 %    LA vol index A/L 105.4 ml/m2    Tricuspid annular plane systolic excursion 0.7 cm    LVIDd 4.60 cm    LV A4C EF 20.5    Blood Gas Arterial Full Panel   Result Value Ref Range    POCT pH, Arterial 7.44 (H) 7.38 - 7.42 pH    POCT pCO2, Arterial 38 38 - 42 mm Hg    POCT pO2, Arterial 290 (H) 85 - 95 mm Hg    POCT SO2, Arterial 100 94 - 100 %    POCT Oxy Hemoglobin, Arterial 92.2 (L) 94.0 - 98.0 %    POCT Hematocrit Calculated, Arterial 14.0 (L) 36.0 - 46.0 %    POCT Sodium, Arterial 132 (L) 136 - 145 mmol/L    POCT Potassium, Arterial 4.2 3.5 - 5.3 mmol/L    POCT Chloride, Arterial 103 98 - 107 mmol/L    POCT Ionized Calcium, Arterial 1.13 1.10 - 1.33 mmol/L    POCT Glucose, Arterial 160 (H) 74 - 99 mg/dL    POCT Lactate, Arterial 0.7 0.4 - 2.0 mmol/L    POCT Base Excess, Arterial 1.5 -2.0 - 3.0 mmol/L    POCT HCO3 Calculated, Arterial 25.8 22.0 - 26.0 mmol/L    POCT Hemoglobin, Arterial 4.8 (LL) 12.0 - 16.0 g/dL    POCT Anion Gap, Arterial 7 (L) 10 - 25 mmo/L    Patient Temperature 37.0 degrees  Celsius    FiO2 40 %   CBC and Auto Differential   Result Value Ref Range    WBC 6.4 4.4 - 11.3 x10*3/uL    nRBC 6.9 (H) 0.0 - 0.0 /100 WBCs    RBC 2.23 (L) 4.00 - 5.20 x10*6/uL    Hemoglobin 6.9 (L) 12.0 - 16.0 g/dL    Hematocrit 20.4 (L) 36.0 - 46.0 %    MCV 92 80 - 100 fL    MCH 30.9 26.0 - 34.0 pg    MCHC 33.8 32.0 - 36.0 g/dL    RDW 22.2 (H) 11.5 - 14.5 %    Platelets 59 (L) 150 - 450 x10*3/uL    Neutrophils % 86.5 40.0 - 80.0 %    Immature Granulocytes %, Automated 7.4 (H) 0.0 - 0.9 %    Lymphocytes % 1.7 13.0 - 44.0 %    Monocytes % 4.1 2.0 - 10.0 %    Eosinophils % 0.3 0.0 - 6.0 %    Basophils % 0.0 0.0 - 2.0 %    Neutrophils Absolute 5.49 1.20 - 7.70 x10*3/uL    Immature Granulocytes Absolute, Automated 0.47 0.00 - 0.70 x10*3/uL    Lymphocytes Absolute 0.11 (L) 1.20 - 4.80 x10*3/uL    Monocytes Absolute 0.26 0.10 - 1.00 x10*3/uL    Eosinophils Absolute 0.02 0.00 - 0.70 x10*3/uL    Basophils Absolute 0.00 0.00 - 0.10 x10*3/uL   Lactate Dehydrogenase   Result Value Ref Range    LDH 2,781 (H) 84 - 246 U/L   Calcium, Ionized   Result Value Ref Range    POCT Calcium, Ionized 1.10 1.1 - 1.33 mmol/L   Heparin Assay, UFH   Result Value Ref Range    Heparin Unfractionated 0.4 See Comment Below for Therapeutic Ranges IU/mL   Magnesium   Result Value Ref Range    Magnesium 2.57 (H) 1.60 - 2.40 mg/dL   Renal Function Panel   Result Value Ref Range    Glucose 155 (H) 74 - 99 mg/dL    Sodium 136 136 - 145 mmol/L    Potassium 4.1 3.5 - 5.3 mmol/L    Chloride 101 98 - 107 mmol/L    Bicarbonate 22 21 - 32 mmol/L    Anion Gap 17 10 - 20 mmol/L    Urea Nitrogen 13 6 - 23 mg/dL    Creatinine 1.02 0.50 - 1.05 mg/dL    eGFR 75 >60 mL/min/1.73m*2    Calcium 8.1 (L) 8.6 - 10.6 mg/dL    Phosphorus 1.3 (L) 2.5 - 4.9 mg/dL    Albumin 3.5 3.4 - 5.0 g/dL   Morphology   Result Value Ref Range    RBC Morphology See Below     Polychromasia Mild     Hypochromia Mild     RBC Fragments Few     Ovalocytes Few     Alex Cells Many    Blood Gas  Arterial Full Panel   Result Value Ref Range    POCT pH, Arterial 7.38 7.38 - 7.42 pH    POCT pCO2, Arterial 44 (H) 38 - 42 mm Hg    POCT pO2, Arterial 453 (H) 85 - 95 mm Hg    POCT SO2, Arterial 100 94 - 100 %    POCT Oxy Hemoglobin, Arterial 93.7 (L) 94.0 - 98.0 %    POCT Hematocrit Calculated, Arterial 22.0 (L) 36.0 - 46.0 %    POCT Sodium, Arterial 132 (L) 136 - 145 mmol/L    POCT Potassium, Arterial 4.0 3.5 - 5.3 mmol/L    POCT Chloride, Arterial 103 98 - 107 mmol/L    POCT Ionized Calcium, Arterial 1.13 1.10 - 1.33 mmol/L    POCT Glucose, Arterial 153 (H) 74 - 99 mg/dL    POCT Lactate, Arterial 0.5 0.4 - 2.0 mmol/L    POCT Base Excess, Arterial 0.7 -2.0 - 3.0 mmol/L    POCT HCO3 Calculated, Arterial 26.0 22.0 - 26.0 mmol/L    POCT Hemoglobin, Arterial 7.4 (L) 12.0 - 16.0 g/dL    POCT Anion Gap, Arterial 7 (L) 10 - 25 mmo/L    Patient Temperature 37.0 degrees Celsius    FiO2 40 %   Heparin Assay, UFH   Result Value Ref Range    Heparin Unfractionated 0.4 See Comment Below for Therapeutic Ranges IU/mL   ECMO Arterial Blood Gas   Result Value Ref Range    POCT pH, Arterial ECMO 7.38 Reference range not established pH    POCT pCO2, Arterial ECMO 45 Reference range not established mm Hg    POCT pO2,  Arterial ECMO 412 Reference range not established mm Hg    POCT SO2, Arterial ECMO 100 Reference range not established %    POCT Oxy Hemoglobin, Arterial ECMO 92.3 Reference range not established %    POCT Base Excess, Arterial ECMO 1.0 Reference range not established mmol/L    POCT HCO3 Calculated, Arterial ECMO 26.6 Reference range not established mmol/L    Patient Temperature 37.0 degrees Celsius    FiO2 40 %   ECMO Venous Blood Gas   Result Value Ref Range    POCT pH, Venous ECMO 7.33 Reference range not established pH    POCT pCO2, Venous ECMO 48 Reference range not established mm Hg    POCT pO2,  Venous  ECMO 42 Reference range not established mm Hg    POCT SO2, Venous ECMO 86 Reference range not established %     POCT Oxy Hemoglobin, Venous ECMO 80.3 Reference range not established %    POCT Base Excess, Venous ECMO -1.1 Reference range not established mmol/L    POCT HCO3 Calculated, Venous ECMO 25.3 Reference range not established mmol/L    Patient Temperature 37.0 degrees Celsius    FiO2 40 %   Prepare RBC: 1 Units, Leukocytes Reduced (CMV reduced risk)   Result Value Ref Range    PRODUCT CODE Z7470K15     Unit Number [id number]     Unit ABO O     Unit RH POS     XM INTEP COMP     Dispense Status XM     Blood Expiration Date April 16, 2024 23:59 EDT     PRODUCT BLOOD TYPE 5100     UNIT VOLUME 350    Reticulocytes   Result Value Ref Range    Retic % 4.4 (H) 0.5 - 2.0 %    Retic Absolute 0.093 (H) 0.018 - 0.083 x10*6/uL    Reticulocyte Hemoglobin 31 28 - 38 pg    Immature Retic fraction 36.4 (H) <=16.0 %   Calcium, Ionized   Result Value Ref Range    POCT Calcium, Ionized 1.07 (L) 1.1 - 1.33 mmol/L   CBC   Result Value Ref Range    WBC 6.0 4.4 - 11.3 x10*3/uL    nRBC 6.8 (H) 0.0 - 0.0 /100 WBCs    RBC 2.11 (L) 4.00 - 5.20 x10*6/uL    Hemoglobin 6.5 (LL) 12.0 - 16.0 g/dL    Hematocrit 19.2 (L) 36.0 - 46.0 %    MCV 91 80 - 100 fL    MCH 30.8 26.0 - 34.0 pg    MCHC 33.9 32.0 - 36.0 g/dL    RDW 22.6 (H) 11.5 - 14.5 %    Platelets 30 (LL) 150 - 450 x10*3/uL   Coagulation Screen   Result Value Ref Range    Protime 13.7 (H) 9.8 - 12.8 seconds    INR 1.2 (H) 0.9 - 1.1    aPTT 95 (H) 27 - 38 seconds   Blood Gas Arterial Full Panel   Result Value Ref Range    POCT pH, Arterial 7.38 7.38 - 7.42 pH    POCT pCO2, Arterial 47 (H) 38 - 42 mm Hg    POCT pO2, Arterial 405 (H) 85 - 95 mm Hg    POCT SO2, Arterial 100 94 - 100 %    POCT Oxy Hemoglobin, Arterial 94.3 94.0 - 98.0 %    POCT Hematocrit Calculated, Arterial 21.0 (L) 36.0 - 46.0 %    POCT Sodium, Arterial 133 (L) 136 - 145 mmol/L    POCT Potassium, Arterial 4.0 3.5 - 5.3 mmol/L    POCT Chloride, Arterial 104 98 - 107 mmol/L    POCT Ionized Calcium, Arterial 1.10 1.10 - 1.33  mmol/L    POCT Glucose, Arterial 143 (H) 74 - 99 mg/dL    POCT Lactate, Arterial 0.6 0.4 - 2.0 mmol/L    POCT Base Excess, Arterial 2.4 -2.0 - 3.0 mmol/L    POCT HCO3 Calculated, Arterial 27.8 (H) 22.0 - 26.0 mmol/L    POCT Hemoglobin, Arterial 6.9 (L) 12.0 - 16.0 g/dL    POCT Anion Gap, Arterial 5 (L) 10 - 25 mmo/L    Patient Temperature 37.0 degrees Celsius    FiO2 40 %   Prepare RBC: 1 Units, Leukocytes Reduced (CMV reduced risk)   Result Value Ref Range    PRODUCT CODE X8908F41     Unit Number [id number]     Unit ABO O     Unit RH POS     XM INTEP COMP     Dispense Status TR     Blood Expiration Date April 23, 2024 23:59 EDT     PRODUCT BLOOD TYPE 5100     UNIT VOLUME 350    Hepatic function panel   Result Value Ref Range    Albumin 3.6 3.4 - 5.0 g/dL    Bilirubin, Total 3.2 (H) 0.0 - 1.2 mg/dL    Bilirubin, Direct 1.1 (H) 0.0 - 0.3 mg/dL    Alkaline Phosphatase 133 (H) 33 - 110 U/L    ALT 47 (H) 7 - 45 U/L     (H) 9 - 39 U/L    Total Protein 5.7 (L) 6.4 - 8.2 g/dL   Magnesium   Result Value Ref Range    Magnesium 2.45 (H) 1.60 - 2.40 mg/dL   Basic Metabolic Panel   Result Value Ref Range    Glucose 153 (H) 74 - 99 mg/dL    Sodium 136 136 - 145 mmol/L    Potassium 3.8 3.5 - 5.3 mmol/L    Chloride 101 98 - 107 mmol/L    Bicarbonate 27 21 - 32 mmol/L    Anion Gap 12 10 - 20 mmol/L    Urea Nitrogen 12 6 - 23 mg/dL    Creatinine 0.99 0.50 - 1.05 mg/dL    eGFR 77 >60 mL/min/1.73m*2    Calcium 7.9 (L) 8.6 - 10.6 mg/dL   Phosphorus   Result Value Ref Range    Phosphorus 2.3 (L) 2.5 - 4.9 mg/dL   POCT GLUCOSE   Result Value Ref Range    POCT Glucose 127 (H) 74 - 99 mg/dL   CBC   Result Value Ref Range    WBC 4.9 4.4 - 11.3 x10*3/uL    nRBC 7.8 (H) 0.0 - 0.0 /100 WBCs    RBC 2.55 (L) 4.00 - 5.20 x10*6/uL    Hemoglobin 7.3 (L) 12.0 - 16.0 g/dL    Hematocrit 22.4 (L) 36.0 - 46.0 %    MCV 88 80 - 100 fL    MCH 28.6 26.0 - 34.0 pg    MCHC 32.6 32.0 - 36.0 g/dL    RDW 21.7 (H) 11.5 - 14.5 %    Platelets 27 (LL)  150 - 450 x10*3/uL     *Note: Due to a large number of results and/or encounters for the requested time period, some results have not been displayed. A complete set of results can be found in Results Review.       XR chest 1 view    Result Date: 3/28/2024  Interpreted By:  Sandra Griffith and Stephens Katherine STUDY: XR CHEST 1 VIEW;  3/27/2024 8:38 pm   INDICATION: Signs/Symptoms:Post op cardiac surgery.   COMPARISON: Chest radiograph 03/27/2024   ACCESSION NUMBER(S): [id number]   ORDERING CLINICIAN: YESENIA KHAN   FINDINGS: AP radiograph of the chest was provided.   Endotracheal tube noted with tip projecting approximately 2.0 cm in the right mainstem bronchus. Enteric tube seen coursing below the level diaphragm with tip out of the field of view. Right subclavian Impella device overlying the cardiomediastinal silhouette. Right IJ central venous catheter with tip overlying the mid SVC. ECMO cannula with tip overlying the expected location of the right atrium. Surgical clips overlie the cardiomediastinal silhouette and right axilla. Cardiac device projects over the cardiomediastinal silhouette. Postsurgical changes from median sternotomy.   CARDIOMEDIASTINAL SILHOUETTE: The cardiomediastinal silhouette is obscured. The mediastinum is shifted to the left likely secondary atelectasis.   LUNGS: Hazy opacification of the left hemithorax. Right basilar atelectasis. No pneumothorax.   ABDOMEN: No remarkable upper abdominal findings.   BONES: No acute osseous changes.       1.  Endotracheal tube with tip in the right mainstem bronchus, retraction of at least 2 cm is recommended. 2. Hazy opacification of the left hemithorax with leftward mediastinal shift, likely secondary atelectasis. 3. Postsurgical changes and medical devices as described above.   I personally reviewed the images/study and I agree with Charity Ross DO's (radiology resident) findings as stated. This study was interpreted at [place], [place].   MACRO: Charity Ross discussed the significance and urgency of this critical finding by telephone with  YESENIA KHAN on 3/27/2024 at 9:37 pm.  (**-RCF-**) Findings:  See findings.     Signed by: Sandra Griffith 3/28/2024 9:19 AM Dictation workstation:   [id number]    Vascular US Lower Extremity Arterial Duplex Bilateral    Result Date: 3/28/2024  Preliminary Cardiology Report           [place] [address]   Tel [phone] and Fax [phone]                 Preliminary Vascular Lab Report  VASC US LOWER EXTREMITY ARTERIAL DUPLEX GRAFT BILATERAL W/ KATHIE  Patient Name:     MIGUEL DIMAS      Reading           [id number] [address]                Physician:        MD Study Date:       3/27/2024            Ordering          [id number] FIONA WHYTE                                        Physician: MRN/PID:          [id number]             Technologist:     Evangelina Bailey RVT Accession#:       [id number]         Technologist 2:   Joy Lewis RVT Date of           1990             Encounter#:       [id number] Birth/Age: Gender:           F Admission Status: Inpatient            Location          [facility]                                        Performed:  Diagnosis/ICD: Acute Kidney Failure-N17.9; Encounter for other preprocedural                examination-Z01.818 Indication:    pre ECMO placement Procedure/CPT: 58655 Peripheral artery Lower arterial Duplex BPG complete  **Report Amended** Report Amended By: Evangelina Bailey RVT     Date and Time: 3/28/2024 at 3/28/2024  **CRITICAL RESULT** Critical Result: Left SFA prox occlusion with reconstitution via collateralized flow noted distally. Notification called to Pretty Toussaint MD on 3/27/2024 at 3:20:25 PM by Evangelina Bailey RVT.  PRELIMINARY CONCLUSIONS: Right Lower Arterial: Evaluated right EIA distal, CFA, SFA prox,  and Profunda appear patent. Left Lower Arterial: There appears to be a short segment occlusion noted in the proximal SFA just distal to the CFA bifurcation. There is collateralized flow noted distally. There are elevated velocities noted in the left CFA, may be overestimated due to shadowing from previous procedures. Right Lower Venous: The evaluated right EIV distal, CFV, FV prox, and DFV appear patent with no evidence of acute deep vein thrombus. Left Lower Venous: Cannot rule out thrombus in non-compressible iliac and common femoral vein veins. Patient unable to tolerate compressions. Color flow and Doppler noted. No ultrasonic evidence of acute deep vein thrombus in the evaluated left EIV distal, CFV, FV prox, and DFV.  Additional Findings: Irregular heart rhythm noted due to cardiac device. Left groin was technically difficult due to previous procedures and scarring.  Imaging & Doppler Findings:  Right                 Compressible Thrombus   Flow Distal External Iliac     Yes        None   Pulsatile CFV                       Yes        None   Pulsatile PFV                       Yes        None   Pulsatile FV Proximal               Yes        None   Pulsatile  Left                  Compress Thrombus   Flow Distal External Iliac                   Pulsatile CFV                                     Pulsatile PFV                                     Pulsatile FV Proximal             Yes      None   Pulsatile  Right                     Left   PSV                       PSV 56 cm/s        EIA        30 cm/s 29 cm/s        CFA        72 cm/s 36 cm/s Profunda Proximal 70 cm/s 40 cm/s   SFA Proximal    28 cm/s  VASCULAR PRELIMINARY REPORT completed by Evangelina Bailey RVDILMA on 3/28/2024 at 7:32:14 AM  ** Final (Updated) **     XR abdomen 1 view    Result Date: 3/27/2024  Interpreted By:  Sandra Griffith and Sheng Max STUDY: XR ABDOMEN 1 VIEW; XR CHEST 1 VIEW;  3/26/2024 7:12 pm; 3/27/2024 7:44 am   INDICATION:  Signs/Symptoms:S/p OHT r/o ileas; Signs/Symptoms:Impella.   COMPARISON: Chest radiographs dated 03/26/2024, 03/25/2024, 03/22/2024 and CT chest abdomen pelvis dated 03/17/2024   ACCESSION NUMBER(S): [id number]; [id number]   ORDERING CLINICIAN: FILOMENA KEANE   FINDINGS: AP radiographs of the chest and abdomen were provided:   LINES AND DEVICES: Right subclavian approach Impella device projects over the left ventricle. Right internal jugular approach central venous catheter tip projects over the lower SVC. CardioMEMS device projects over the cardiomediastinal silhouette. Numerous cardiac pacing wires are present. Surgical clips project over the cardiomediastinal silhouette and right axilla. Numerous intact median sternotomy wires.   CARDIOMEDIASTINAL SILHOUETTE: Stable enlargement of the cardiomediastinal silhouette is stable in size and configuration.   LUNGS: There is no pulmonary edema. Linear subsegmental bibasilar atelectasis. Trace bilateral pleural effusions. No focal consolidation or pneumothorax is seen.   ABDOMEN: Paucity of gas in the imaged bowel loops with nonobstructive bowel gas pattern. Limited evaluation of pneumoperitoneum on supine imaging, however no gross evidence of free air is noted.   BONES: No acute osseous abnormality.       1. Stable enlargement of the cardiomediastinal silhouette with trace bilateral pleural effusions. 2. Paucity of gas in the imaged bowel loops with nonobstructive bowel gas pattern.     I personally reviewed the images/study and I agree with the findings as stated by Dr. Tee Joe. This study was interpreted at [place], [place].   MACRO: none   Signed by: Sandra Griffith 3/27/2024 2:51 PM Dictation workstation:   [id number]    XR chest 1 view    Result Date: 3/27/2024  Interpreted By:  Sandra Griffith and Sheng Max STUDY: XR ABDOMEN 1 VIEW; XR CHEST 1 VIEW;  3/26/2024 7:12 pm; 3/27/2024 7:44 am    INDICATION: Signs/Symptoms:S/p OHT r/o ileas; Signs/Symptoms:Impella.   COMPARISON: Chest radiographs dated 03/26/2024, 03/25/2024, 03/22/2024 and CT chest abdomen pelvis dated 03/17/2024   ACCESSION NUMBER(S): [id number]; [id number]   ORDERING CLINICIAN: FILOMENA ROMERO; JOANNA KEANE   FINDINGS: AP radiographs of the chest and abdomen were provided:   LINES AND DEVICES: Right subclavian approach Impella device projects over the left ventricle. Right internal jugular approach central venous catheter tip projects over the lower SVC. CardioMEMS device projects over the cardiomediastinal silhouette. Numerous cardiac pacing wires are present. Surgical clips project over the cardiomediastinal silhouette and right axilla. Numerous intact median sternotomy wires.   CARDIOMEDIASTINAL SILHOUETTE: Stable enlargement of the cardiomediastinal silhouette is stable in size and configuration.   LUNGS: There is no pulmonary edema. Linear subsegmental bibasilar atelectasis. Trace bilateral pleural effusions. No focal consolidation or pneumothorax is seen.   ABDOMEN: Paucity of gas in the imaged bowel loops with nonobstructive bowel gas pattern. Limited evaluation of pneumoperitoneum on supine imaging, however no gross evidence of free air is noted.   BONES: No acute osseous abnormality.       1. Stable enlargement of the cardiomediastinal silhouette with trace bilateral pleural effusions. 2. Paucity of gas in the imaged bowel loops with nonobstructive bowel gas pattern.     I personally reviewed the images/study and I agree with the findings as stated by Dr. Tee Joe. This study was interpreted at [place], [place].   MACRO: none   Signed by: Sandra Griffith 3/27/2024 2:51 PM Dictation workstation:   [id number]    Transthoracic Echo (TTE) Complete    Result Date: 3/27/2024   [facility], [address] [place], [place] [id number]                Tel [phone] and Fax  [phone] TRANSTHORACIC ECHOCARDIOGRAM REPORT  Patient Name:      MIGUEL DIMAS      Reading Physician:    [id number] Patrica BASS MD Study Date:        3/27/2024            Ordering Provider:    [id number] KONSTANTIN REG PERKINS MRN/PID:           [id number]             Fellow: Accession#:        [id number]         Nurse: Date of Birth/Age: 1990 / 34 years  Sonographer:          MAURO Colbert RDCS Gender:            F                    Additional Staff: Height:            152.40 cm            Admit Date:           2/15/2024 Weight:            95.71 kg             Admission Status:     Inpatient - STAT BSA / BMI:         1.91 m2 / 41.21      Encounter#:           [id number]                    kg/m2                                         Department Location:  [address] Blood Pressure: 93 /57 mmHg Study Type:    TRANSTHORACIC ECHO (TTE) COMPLETE Diagnosis/ICD: 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 Definity used as a contrast agent for endocardial border               definition. Total contrast used for this procedure was 3 mL via IV               push. The patient was awake.  PHYSICIAN INTERPRETATION: Left Ventricle: The left ventricular systolic function is severely decreased, with an estimated ejection fraction of 25%. There is global hypokinesis of the left ventricle with minor regional variations. The left ventricular cavity size is normal. Left ventricular diastolic filling was indeterminate. There is no definite left ventricular thrombus visualized. Echocontrast was used and excluded LV apical thrombus. Left Atrium: The left atrium is consistent with transplant. Right Ventricle: The  right ventricle is mildly enlarged. There is reduced right ventricular systolic function. Right Atrium: The right atrium is mildly dilated. Aortic Valve: The aortic valve was not well visualized. There is indeterminate aortic valve regurgitation. Mitral Valve: The mitral valve is mildly thickened. There is moderate to severe mitral valve regurgitation which is centrally directed. Tricuspid Valve: The tricuspid valve is structurally normal. There is severe tricuspid regurgitation. The Doppler estimated RVSP is within normal limits at 28.8 mmHg. RSVP likely underestimated due to the severity of TR. Pulmonic Valve: The pulmonic valve is not well visualized. Pulmonic valve regurgitation was not assessed. Pericardium: There is a trivial pericardial effusion. Pleural: There is left pleural effusion. Aorta: The aortic root is normal. In comparison to the previous echocardiogram(s): Compared with the prior exam from 3/25/2024, there is still severe global LV systolic dysfunction while on full Impella suppoert. There was already moderate to severe MR which persists. TR was not assessed on the prior study but is severe TR today. Although the RVSP is estimated to be normal based on TR Vmax, that is likely underestimated due to the severity of TR. There was previously severely reduced RV function previously and appears imilar today.  LEFT VENTRICULAR ASSIST DEVICE: LVAD: The patient has a(n) Impella LVAD device present. LVAD Comments: IMpella extends approx 3.8-4.0 cm beneath the AV and is angles posteriorly.  CONCLUSIONS:  1. Left ventricular systolic function is severely decreased with a 25% estimated ejection fraction.  2. Echocontrast was used and excluded LV apical thrombus.  3. No left ventricular thrombus visualized.  4. There is reduced right ventricular systolic function.  5. Moderate to severe mitral valve regurgitation.  6. RVSP within normal limits.  7. Severe tricuspid regurgitation visualized.  8. Compared with  the prior exam from 3/25/2024, there is still severe global LV systolic dysfunction while on full Impella suppoert. There was already moderate to severe MR which persists. TR was not assessed on the prior study but is severe TR today. Although the RVSP is estimated to be normal based on TR Vmax, that is likely underestimated due to the severity of TR. There was previously severely reduced RV function previously and appears imilar today.  9. There is global hypokinesis of the left ventricle with minor regional variations. QUANTITATIVE DATA SUMMARY: 2D MEASUREMENTS:                          Normal Ranges: IVSd:          0.90 cm   (0.6-1.1cm) LVPWd:         0.90 cm   (0.6-1.1cm) LVIDd:         4.20 cm   (3.9-5.9cm) LVIDs:         3.30 cm LV Mass Index: 62.1 g/m2 LV % FS        21.4 % AORTA MEASUREMENTS:                      Normal Ranges: Ao Sinus, d: 2.00 cm (2.1-3.5cm) LV SYSTOLIC FUNCTION BY 2D PLANIMETRY (MOD):                     Normal Ranges: EF-A4C View: 25.6 % (>=55%) EF-A2C View: 26.2 % EF-Biplane:  35.2 %  RIGHT VENTRICLE: RV Basal 3.50 cm RV Mid   2.40 cm RV Major 7.0 cm TAPSE:   6.5 mm TRICUSPID VALVE/RVSP:                             Normal Ranges: Peak TR Velocity: 2.54 m/s RV Syst Pressure: 28.8 mmHg (< 30mmHg)  [id number] Patrica Aguilera MD Electronically signed on 3/27/2024 at 1:20:02 PM  ** Final **         This patient has a urinary catheter   Reason for the urinary catheter remaining today? Urine catheter unnecessary, will be removed today    This patient is intubated   Reason for patient to remain intubated today? they are planned for extubation trial later today/tonight       Malnutrition Diagnosis Status: Ongoing  Malnutrition Diagnosis: Moderate malnutrition related to acute disease or injury  As Evidenced by: pt's reported intake likely meeting <75% of estimated needs for at least 7 days, previous 5% weight loss in 1 month, LOS 42.  I agree with the dietitian's malnutrition  diagnosis.      Assessment/Plan   Ms. Yimi Bowles is a 34F with a PMHx sig for stage D HFrEF (PPCM) s/p ICD s/p CardioMEMs, hypothyroidism 2/2 thyroidectomy, obesity s/p gastric bypass (2017), and SLE who is s/p OHT (3/31/2022) with a post-OHT course complicated by RIJ/RUE DVTs, leukopenia, left groin seroma, and asymptomatic 2R ACR (11/2022) treated with steroids, s/p total hysterectomy (2023), Flu A (1/2024).     Originally presented to [facility] ED on 2/15/24 with complaints of N/V x 3 days and inability to keep medications down. Found to have acute systolic heart failure with primary graft failure and cardiogenic shock. Treated for suspected acute cellular vs. acute antibody mediated rejection; however, multiple cardiac biopsies negative for signs of rejection or other causes of graft failure. Course has been complicated by multiple MCS devices for refractory cardiogenic shock (right femoral IABP: 2/18/24 - 3/1/24, Left femoral VA ECMO: 2/19/24 - 2/29/24, Right axillary impella 5.5: 3/1/24 - remains in place, 2nd right femoral VA ECMO: 3/27/24 - remains in place), ARF requiring RRT, acute liver injury, intubation due to volume overload in setting of pulmonary edema, severe hemolysis 2/2 to impella malposition, mild CMV viremia, bilateral provoked IJ DVTs, multiple episodes of epistaxis & coagulopathies requiring ongoing blood product transfusions.       Transferred to CTICU on 3/27/24 following right femoral VA ECMO placement due to worsening cardiogenic shock and multi-organ failure despite Impella 5.5 support and multiple inotropes.       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  #Acute renal failure requiring RRT   #Acute transaminitis  #Epistaxis   #Acute coagulopathy   #Low grade CMV Viremia  #Provoked bilateral IJ DVTs    #Thrombocytopenia   #Anemia     Donor/Recipient Infectious history:  CMV: -/+ (low grade CMV viremia w/  levels < 35 on 3/1/24, repeat CMV levels remain negative since 3/8/24)  Toxo: -/-   Hep C: -/-     Rejection/Prophylaxis (transplants):  - Steroids: Continue 10mg PO prednisone daily  - Tacrolimus: 0.5mg liquid @ 0630 and 1mg liquid @ 1830 w/ daily levels drawn @ 0600  - Tacrolimus goal troughs: 8-10  - Myfortic acid: HOLD 180mg BID    - Antifungals: nystatin 500,000units Q6  - Antivirals: valcyte 450mg Q48hrs   - Anti PCP & Toxoplasmosis: holding Bactrim SS daily due to thrombocytopenia     Last cardiac biopsy: 2/16/24 with ACR grade 1R and no AMR (extremely low C4d+ detected), 2/20/24 negative for ACR and AMR  Last HLA: 3/2/24 negative for DSAs   Last RHC: closing numbers on 3/16/24 /86(97) RA 20, PAP 40/33(37), C.O./C.I. 5.5/2.8, PVR 88, SVR 1115   Last LHC: 2/18/24 negative for CAV and CAD   Last TTE/BOB: 3/27/24 shows LVEF 25% w/ global hypokinesis, severe RV dysfunction, mod-severe MR, severe TR and impella angled posteriorly   Osteopenia/osteoporosis prophylaxis: Vitamin D3 and calcium supplements  Peptic/gastric ulcer prophylaxis: Pantoprazole 40mg BID -> can transition to daily in next 24-48hrs if no signs of UGIB   CAV Prophylaxis: Holding Aspirin 81mg due to continued thrombocytopenia & pravastatin due to transaminitis     - Unclear cause of acute severe graft dysfunction. Broad differential including SLE flair, giant cell vasculitis, CAV/CAD, viral cardiomyopathy, sarcoidosis, amyloidosis and allograft rejection amongst many others. Most likely smoldering ACR vs. AMR; however, 2xallograft biopsies on 2/16 & 2/20 remain negative for any significant pathology. Notably, both biopsies taken after rejection therapies implemented which may have reduced areas of graft damage.    - DSAs remain negative; however, patient may have non-HLA antibodies present. 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   - Negative CAV & CAD via LHC on 2/18/24   - Completed methylpred steroid pulse w/ 1g Q24 x 3 days  (2/16-2/18) and prednisone taper   - Thymoglobulin doses: 2/18 & 2/19   - IVIG + PLEX Session: 2/18, 2/20, 3/7, 3/11 and 3/13    - Right femoral VA ECMO flowing 3.5L w/ FIO2 100% and sweep 1.5  - Right axillary impella for LV venting remains in place and set P6 w/ flows of 1.9-2.3  - Epinephrine 0.04mcg/kg/min and dobutamine 5.0mcg/kg/min   - Decreased pulse pressure of ~5-15 on A-line with pulsatile flow.  - Lactate normalized    - Remains sedated with Precedex and intubated via ETT: Vent lung protective and set ACVC FIO2 50%, RR 10,  and PEEP 8   - LFTs improving s/p ECMO cannulation with stable hemolytic labwork   - Previously cardiorenal induced ARF resolved w/ renal recovery following ECMO cannulation. RRT stopped on 2/27/24 and patient had been making appropriate UOP. Post ECMO decannulation, again developed ARF requiring RRT. Her kidneys have had multiple injuries due to poor cardiac output and now with hemolysis induced injury. Will have been on RRT x 6 weeks as of 3/29/24, meeting criteria for kidney transplantation. Abdominal Transplant team will formally approve for kidney transplantation pending approval for heart transplantation.    - At this time, the patient has developed refractory heart failure and renal failure. Discussion for heart and kidney transplantation are ongoing. Concerns remain with deconditioning, multiorgan failure, lack of diagnosis causing graft failure, possible rejection despite appropriate immunotherapy, and optimal immunotherapy plan if re-transplanted.      Recommendations:  - Agree with reassessment/readjustment of Impella placement  - Agree with line holiday (CVVHD currently running through ECMO circuit), however ideally would like PA catheter for further optimization. Line placement difficult given L internal jugular thrmobosis, femoral ECMO cannula, and thrombocytopenia.    - Agree with CVVH via ECMO circuit   - Agree with SBT and potential extubation as appropriate  -  Agree with reducing dose of heparin infusion today with recent epistaxis and current thrombocytopenia/anemia; continue close monitoring of bilateral distal extremity perfusion. Follow-up repeat PF4 level.   - Agree with leukocyte-reduced pRBC transfusion x1 today   - Continue Tacro 0.5mg/1mg liquid BID (increased 2/2 subtherapeutic level & absence of oral only Myfortic currently as pt remains intubated). 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   - Plan to discuss heart transplantation at thoracic transplant selection meeting on 4/2/24     Appreciate continued CTICU care/management.       Patient was examined and discussed with [facility] Transplant Cardiology attending physician, Dr. Fabrice Price.    Signed,  Gwendolyn Del Valle MD  Heart Failure Fellow PGY4

## 2024-03-30 NOTE — PROGRESS NOTES
"        INPATIENT TRANSPLANT NEPHROLOGY PROGRESS NOTE          REASON FOR CONSULT:  Immunosuppressive medication management and nephrology related issues.    SUBJECTION:  CVVH Procedure note    ECMO  On pressors  Tolerated CVVH well  Discussed with heart failure team   No acute event overnight.    PHYCISCAL EXAMINATION:    Visit Vitals  BP 78/74   Pulse 78   Temp 36.3 °C (97.3 °F) (Temporal)   Resp 23   Ht 1.549 m (5' 0.98\")   Wt 96 kg (211 lb 10.3 oz)   SpO2 100%   BMI 40.01 kg/m²   OB Status Hysterectomy   Smoking Status Never   BSA 2.03 m²        03/28 1900 - 03/30 0659  In: 1902.1 [P.O.:100; I.V.:1117.1]  Out: 7868      Weight change:     Constitutional:       General: She is not in acute distress.     Appearance: Normal appearance. She is ill-appearing and toxic-appearing.   HENT:      Head: Normocephalic.      Mouth/Throat:      Mouth: Mucous membranes are moist.   Eyes:      Extraocular Movements: Extraocular movements intact.      Pupils: Pupils are equal, round, and reactive to light.   Neck:      Comments: RIJ dialysis line present - CDI  Cardiovascular:      Rate and Rhythm: Regular rhythm. Tachycardia present.      Pulses: Normal pulses.      Heart sounds: Normal heart sounds.   Pulmonary:      Effort: Pulmonary effort is normal. No respiratory distress.      Breath sounds: Normal breath sounds.   Chest:      Comments: Right axillary impella site CDI   Abdominal:      General: Bowel sounds are normal.      Palpations: Abdomen is soft.      Tenderness: There is no abdominal tenderness.   Musculoskeletal:         General: Normal range of motion.      Cervical back: Normal range of motion.      Right lower leg: Edema present.      Left lower leg: Edema present.   Skin:     General: Skin is warm and dry.      Capillary Refill: Capillary refill takes 2 to 3 seconds.      Coloration: Skin is jaundiced.      Comments: Left groin ECMO cannulation site with drainage    Neurological:      General: No focal deficit " present.      Mental Status: She is alert and oriented to person, place, and time.   Psychiatric:         Behavior: Behavior normal. Behavior is cooperative.     MEDICATION LIST: REVIEWED    [Held by provider] acetaminophen, 650 mg, q4h  bisacodyl, 10 mg, Daily  calcium carbonate-vitamin D3, 1 tablet, Daily  cephalexin, 500 mg, q12h TRICIA  cyanocobalamin, 500 mcg, Daily  darbepoetin floyd, 100 mcg, q14 days  ferrous sulfate (325 mg ferrous sulfate), 65 mg of iron, Daily with breakfast  folic acid, 1 mg, Daily  hydrALAZINE, 10 mg, q8h  insulin lispro, 0-15 Units, q4h  levothyroxine, 200 mcg, Daily  lidocaine, 1 patch, Daily  multivitamin with iron-minerals, 15 mL, Daily  multivitamin with minerals, 1 tablet, Daily  [Held by provider] mycophenolate, 180 mg, BID  nystatin, 5 mL, q6h  oxygen, , Continuous - Inhalation  pantoprazole, 40 mg, Daily  polyethylene glycol, 17 g, BID  potassium phosphate, 21 mmol, Once  potassium, sodium phosphates, 1 packet, q8h  predniSONE, 10 mg, Daily  sennosides-docusate sodium, 2 tablet, BID  [Held by provider] sulfamethoxazole-trimethoprim, 80 mg of trimethoprim, Daily  [START ON 3/31/2024] tacrolimus, 1.5 mg, q24h  tacrolimus, 1.5 mg, q24h  [Held by provider] traZODone, 25 mg, Nightly  valGANciclovir, 450 mg, q48h      dexmedeTOMIDine, Last Rate: 0.8 mcg/kg/hr (03/30/24 1500)  [Held by provider] heparin, Last Rate: Stopped (03/30/24 1435)  heparin Impella Purge 25 units/mL in 500 mL D5W, Last Rate: 10 mL/hr (03/30/24 1500)  lactated Ringer's, Last Rate: 5 mL/hr (03/30/24 1500)  PrismaSol 4/2.5      calcium gluconate, 1 g, q6h PRN  calcium gluconate, 2 g, q6h PRN  dextrose, 25 g, q15 min PRN  dextrose, 25 g, q15 min PRN   Or  glucagon, 1 mg, q15 min PRN  dextrose, 25 g, q15 min PRN   Or  glucagon, 1 mg, q15 min PRN  glucagon, 1 mg, q15 min PRN  hydrALAZINE, 5 mg, q4h PRN  HYDROmorphone, 0.2 mg, q4h PRN  artificial tears, 2 drop, PRN  magnesium sulfate, 2 g, q6h PRN  magnesium sulfate, 4 g,  q6h PRN  microfibrllar collagen, , PRN  mupirocin, , BID PRN  naloxone, 0.2 mg, q5 min PRN  ondansetron, 4 mg, q4h PRN  oxyCODONE, 5 mg, q4h PRN  sodium chloride, 1 spray, 4x daily PRN        ALLERGY:  Allergies   Allergen Reactions    Dapagliflozin GI bleeding and Bleeding     UTI    Urinary tract infection    Empagliflozin Unknown    Myfortic [Mycophenolate Sodium] GI Upset    Topiramate Nausea Only and Nausea/vomiting    Latex Rash       LABS:  Results for orders placed or performed during the hospital encounter of 02/15/24 (from the past 24 hour(s))   Blood Gas Arterial Full Panel   Result Value Ref Range    POCT pH, Arterial 7.44 (H) 7.38 - 7.42 pH    POCT pCO2, Arterial 38 38 - 42 mm Hg    POCT pO2, Arterial 290 (H) 85 - 95 mm Hg    POCT SO2, Arterial 100 94 - 100 %    POCT Oxy Hemoglobin, Arterial 92.2 (L) 94.0 - 98.0 %    POCT Hematocrit Calculated, Arterial 14.0 (L) 36.0 - 46.0 %    POCT Sodium, Arterial 132 (L) 136 - 145 mmol/L    POCT Potassium, Arterial 4.2 3.5 - 5.3 mmol/L    POCT Chloride, Arterial 103 98 - 107 mmol/L    POCT Ionized Calcium, Arterial 1.13 1.10 - 1.33 mmol/L    POCT Glucose, Arterial 160 (H) 74 - 99 mg/dL    POCT Lactate, Arterial 0.7 0.4 - 2.0 mmol/L    POCT Base Excess, Arterial 1.5 -2.0 - 3.0 mmol/L    POCT HCO3 Calculated, Arterial 25.8 22.0 - 26.0 mmol/L    POCT Hemoglobin, Arterial 4.8 (LL) 12.0 - 16.0 g/dL    POCT Anion Gap, Arterial 7 (L) 10 - 25 mmo/L    Patient Temperature 37.0 degrees Celsius    FiO2 40 %   CBC and Auto Differential   Result Value Ref Range    WBC 6.4 4.4 - 11.3 x10*3/uL    nRBC 6.9 (H) 0.0 - 0.0 /100 WBCs    RBC 2.23 (L) 4.00 - 5.20 x10*6/uL    Hemoglobin 6.9 (L) 12.0 - 16.0 g/dL    Hematocrit 20.4 (L) 36.0 - 46.0 %    MCV 92 80 - 100 fL    MCH 30.9 26.0 - 34.0 pg    MCHC 33.8 32.0 - 36.0 g/dL    RDW 22.2 (H) 11.5 - 14.5 %    Platelets 59 (L) 150 - 450 x10*3/uL    Neutrophils % 86.5 40.0 - 80.0 %    Immature Granulocytes %, Automated 7.4 (H) 0.0 - 0.9 %     Lymphocytes % 1.7 13.0 - 44.0 %    Monocytes % 4.1 2.0 - 10.0 %    Eosinophils % 0.3 0.0 - 6.0 %    Basophils % 0.0 0.0 - 2.0 %    Neutrophils Absolute 5.49 1.20 - 7.70 x10*3/uL    Immature Granulocytes Absolute, Automated 0.47 0.00 - 0.70 x10*3/uL    Lymphocytes Absolute 0.11 (L) 1.20 - 4.80 x10*3/uL    Monocytes Absolute 0.26 0.10 - 1.00 x10*3/uL    Eosinophils Absolute 0.02 0.00 - 0.70 x10*3/uL    Basophils Absolute 0.00 0.00 - 0.10 x10*3/uL   Lactate Dehydrogenase   Result Value Ref Range    LDH 2,781 (H) 84 - 246 U/L   Calcium, Ionized   Result Value Ref Range    POCT Calcium, Ionized 1.10 1.1 - 1.33 mmol/L   Heparin Assay, UFH   Result Value Ref Range    Heparin Unfractionated 0.4 See Comment Below for Therapeutic Ranges IU/mL   Magnesium   Result Value Ref Range    Magnesium 2.57 (H) 1.60 - 2.40 mg/dL   Renal Function Panel   Result Value Ref Range    Glucose 155 (H) 74 - 99 mg/dL    Sodium 136 136 - 145 mmol/L    Potassium 4.1 3.5 - 5.3 mmol/L    Chloride 101 98 - 107 mmol/L    Bicarbonate 22 21 - 32 mmol/L    Anion Gap 17 10 - 20 mmol/L    Urea Nitrogen 13 6 - 23 mg/dL    Creatinine 1.02 0.50 - 1.05 mg/dL    eGFR 75 >60 mL/min/1.73m*2    Calcium 8.1 (L) 8.6 - 10.6 mg/dL    Phosphorus 1.3 (L) 2.5 - 4.9 mg/dL    Albumin 3.5 3.4 - 5.0 g/dL   Morphology   Result Value Ref Range    RBC Morphology See Below     Polychromasia Mild     Hypochromia Mild     RBC Fragments Few     Ovalocytes Few     Alex Cells Many    Blood Gas Arterial Full Panel   Result Value Ref Range    POCT pH, Arterial 7.38 7.38 - 7.42 pH    POCT pCO2, Arterial 44 (H) 38 - 42 mm Hg    POCT pO2, Arterial 453 (H) 85 - 95 mm Hg    POCT SO2, Arterial 100 94 - 100 %    POCT Oxy Hemoglobin, Arterial 93.7 (L) 94.0 - 98.0 %    POCT Hematocrit Calculated, Arterial 22.0 (L) 36.0 - 46.0 %    POCT Sodium, Arterial 132 (L) 136 - 145 mmol/L    POCT Potassium, Arterial 4.0 3.5 - 5.3 mmol/L    POCT Chloride, Arterial 103 98 - 107 mmol/L    POCT Ionized  Calcium, Arterial 1.13 1.10 - 1.33 mmol/L    POCT Glucose, Arterial 153 (H) 74 - 99 mg/dL    POCT Lactate, Arterial 0.5 0.4 - 2.0 mmol/L    POCT Base Excess, Arterial 0.7 -2.0 - 3.0 mmol/L    POCT HCO3 Calculated, Arterial 26.0 22.0 - 26.0 mmol/L    POCT Hemoglobin, Arterial 7.4 (L) 12.0 - 16.0 g/dL    POCT Anion Gap, Arterial 7 (L) 10 - 25 mmo/L    Patient Temperature 37.0 degrees Celsius    FiO2 40 %   Heparin Assay, UFH   Result Value Ref Range    Heparin Unfractionated 0.4 See Comment Below for Therapeutic Ranges IU/mL   ECMO Arterial Blood Gas   Result Value Ref Range    POCT pH, Arterial ECMO 7.38 Reference range not established pH    POCT pCO2, Arterial ECMO 45 Reference range not established mm Hg    POCT pO2,  Arterial ECMO 412 Reference range not established mm Hg    POCT SO2, Arterial ECMO 100 Reference range not established %    POCT Oxy Hemoglobin, Arterial ECMO 92.3 Reference range not established %    POCT Base Excess, Arterial ECMO 1.0 Reference range not established mmol/L    POCT HCO3 Calculated, Arterial ECMO 26.6 Reference range not established mmol/L    Patient Temperature 37.0 degrees Celsius    FiO2 40 %   ECMO Venous Blood Gas   Result Value Ref Range    POCT pH, Venous ECMO 7.33 Reference range not established pH    POCT pCO2, Venous ECMO 48 Reference range not established mm Hg    POCT pO2,  Venous  ECMO 42 Reference range not established mm Hg    POCT SO2, Venous ECMO 86 Reference range not established %    POCT Oxy Hemoglobin, Venous ECMO 80.3 Reference range not established %    POCT Base Excess, Venous ECMO -1.1 Reference range not established mmol/L    POCT HCO3 Calculated, Venous ECMO 25.3 Reference range not established mmol/L    Patient Temperature 37.0 degrees Celsius    FiO2 40 %   Prepare RBC: 1 Units, Leukocytes Reduced (CMV reduced risk)   Result Value Ref Range    PRODUCT CODE F5299K87     Unit Number [id number]     Unit ABO O     Unit RH POS     XM INTEP COMP      Dispense Status TR     Blood Expiration Date April 16, 2024 23:59 EDT     PRODUCT BLOOD TYPE 5100     UNIT VOLUME 350    Reticulocytes   Result Value Ref Range    Retic % 4.4 (H) 0.5 - 2.0 %    Retic Absolute 0.093 (H) 0.018 - 0.083 x10*6/uL    Reticulocyte Hemoglobin 31 28 - 38 pg    Immature Retic fraction 36.4 (H) <=16.0 %   Calcium, Ionized   Result Value Ref Range    POCT Calcium, Ionized 1.07 (L) 1.1 - 1.33 mmol/L   CBC   Result Value Ref Range    WBC 6.0 4.4 - 11.3 x10*3/uL    nRBC 6.8 (H) 0.0 - 0.0 /100 WBCs    RBC 2.11 (L) 4.00 - 5.20 x10*6/uL    Hemoglobin 6.5 (LL) 12.0 - 16.0 g/dL    Hematocrit 19.2 (L) 36.0 - 46.0 %    MCV 91 80 - 100 fL    MCH 30.8 26.0 - 34.0 pg    MCHC 33.9 32.0 - 36.0 g/dL    RDW 22.6 (H) 11.5 - 14.5 %    Platelets 30 (LL) 150 - 450 x10*3/uL   Coagulation Screen   Result Value Ref Range    Protime 13.7 (H) 9.8 - 12.8 seconds    INR 1.2 (H) 0.9 - 1.1    aPTT 95 (H) 27 - 38 seconds   Blood Gas Arterial Full Panel   Result Value Ref Range    POCT pH, Arterial 7.38 7.38 - 7.42 pH    POCT pCO2, Arterial 47 (H) 38 - 42 mm Hg    POCT pO2, Arterial 405 (H) 85 - 95 mm Hg    POCT SO2, Arterial 100 94 - 100 %    POCT Oxy Hemoglobin, Arterial 94.3 94.0 - 98.0 %    POCT Hematocrit Calculated, Arterial 21.0 (L) 36.0 - 46.0 %    POCT Sodium, Arterial 133 (L) 136 - 145 mmol/L    POCT Potassium, Arterial 4.0 3.5 - 5.3 mmol/L    POCT Chloride, Arterial 104 98 - 107 mmol/L    POCT Ionized Calcium, Arterial 1.10 1.10 - 1.33 mmol/L    POCT Glucose, Arterial 143 (H) 74 - 99 mg/dL    POCT Lactate, Arterial 0.6 0.4 - 2.0 mmol/L    POCT Base Excess, Arterial 2.4 -2.0 - 3.0 mmol/L    POCT HCO3 Calculated, Arterial 27.8 (H) 22.0 - 26.0 mmol/L    POCT Hemoglobin, Arterial 6.9 (L) 12.0 - 16.0 g/dL    POCT Anion Gap, Arterial 5 (L) 10 - 25 mmo/L    Patient Temperature 37.0 degrees Celsius    FiO2 40 %   Tacrolimus level   Result Value Ref Range    Tacrolimus  4.1 <=15.0 ng/mL   Prepare RBC: 1 Units, Leukocytes  Reduced (CMV reduced risk)   Result Value Ref Range    PRODUCT CODE [id number]     Unit Number [id number]     Unit ABO O     Unit RH POS     XM INTEP COMP     Dispense Status TR     Blood Expiration Date April 23, 2024 23:59 EDT     PRODUCT BLOOD TYPE 5100     UNIT VOLUME 350    Hepatic function panel   Result Value Ref Range    Albumin 3.6 3.4 - 5.0 g/dL    Bilirubin, Total 3.2 (H) 0.0 - 1.2 mg/dL    Bilirubin, Direct 1.1 (H) 0.0 - 0.3 mg/dL    Alkaline Phosphatase 133 (H) 33 - 110 U/L    ALT 47 (H) 7 - 45 U/L     (H) 9 - 39 U/L    Total Protein 5.7 (L) 6.4 - 8.2 g/dL   Magnesium   Result Value Ref Range    Magnesium 2.45 (H) 1.60 - 2.40 mg/dL   Basic Metabolic Panel   Result Value Ref Range    Glucose 153 (H) 74 - 99 mg/dL    Sodium 136 136 - 145 mmol/L    Potassium 3.8 3.5 - 5.3 mmol/L    Chloride 101 98 - 107 mmol/L    Bicarbonate 27 21 - 32 mmol/L    Anion Gap 12 10 - 20 mmol/L    Urea Nitrogen 12 6 - 23 mg/dL    Creatinine 0.99 0.50 - 1.05 mg/dL    eGFR 77 >60 mL/min/1.73m*2    Calcium 7.9 (L) 8.6 - 10.6 mg/dL   Phosphorus   Result Value Ref Range    Phosphorus 2.3 (L) 2.5 - 4.9 mg/dL   POCT GLUCOSE   Result Value Ref Range    POCT Glucose 127 (H) 74 - 99 mg/dL   CBC   Result Value Ref Range    WBC 4.9 4.4 - 11.3 x10*3/uL    nRBC 7.8 (H) 0.0 - 0.0 /100 WBCs    RBC 2.55 (L) 4.00 - 5.20 x10*6/uL    Hemoglobin 7.3 (L) 12.0 - 16.0 g/dL    Hematocrit 22.4 (L) 36.0 - 46.0 %    MCV 88 80 - 100 fL    MCH 28.6 26.0 - 34.0 pg    MCHC 32.6 32.0 - 36.0 g/dL    RDW 21.7 (H) 11.5 - 14.5 %    Platelets 27 (LL) 150 - 450 x10*3/uL   Blood Gas Arterial Full Panel   Result Value Ref Range    POCT pH, Arterial 7.38 7.38 - 7.42 pH    POCT pCO2, Arterial 41 38 - 42 mm Hg    POCT pO2, Arterial 414 (H) 85 - 95 mm Hg    POCT SO2, Arterial 100 94 - 100 %    POCT Oxy Hemoglobin, Arterial 94.1 94.0 - 98.0 %    POCT Hematocrit Calculated, Arterial 23.0 (L) 36.0 - 46.0 %    POCT Sodium, Arterial 133 (L) 136 - 145 mmol/L    POCT  Potassium, Arterial 4.4 3.5 - 5.3 mmol/L    POCT Chloride, Arterial 103 98 - 107 mmol/L    POCT Ionized Calcium, Arterial 1.13 1.10 - 1.33 mmol/L    POCT Glucose, Arterial 159 (H) 74 - 99 mg/dL    POCT Lactate, Arterial 0.5 0.4 - 2.0 mmol/L    POCT Base Excess, Arterial -0.8 -2.0 - 3.0 mmol/L    POCT HCO3 Calculated, Arterial 24.3 22.0 - 26.0 mmol/L    POCT Hemoglobin, Arterial 7.8 (L) 12.0 - 16.0 g/dL    POCT Anion Gap, Arterial 10 10 - 25 mmo/L    Patient Temperature 37.0 degrees Celsius    FiO2 40 %   Calcium, Ionized   Result Value Ref Range    POCT Calcium, Ionized 1.11 1.1 - 1.33 mmol/L   Coagulation Screen   Result Value Ref Range    Protime 13.7 (H) 9.8 - 12.8 seconds    INR 1.2 (H) 0.9 - 1.1    aPTT 93 (H) 27 - 38 seconds   Heparin Assay, UFH   Result Value Ref Range    Heparin Unfractionated 0.5 See Comment Below for Therapeutic Ranges IU/mL   Hepatic function panel   Result Value Ref Range    Albumin 3.8 3.4 - 5.0 g/dL    Bilirubin, Total 3.8 (H) 0.0 - 1.2 mg/dL    Bilirubin, Direct 1.3 (H) 0.0 - 0.3 mg/dL    Alkaline Phosphatase 140 (H) 33 - 110 U/L    ALT 41 7 - 45 U/L     (H) 9 - 39 U/L    Total Protein 5.8 (L) 6.4 - 8.2 g/dL   Magnesium   Result Value Ref Range    Magnesium 2.39 1.60 - 2.40 mg/dL   Basic Metabolic Panel   Result Value Ref Range    Glucose 164 (H) 74 - 99 mg/dL    Sodium 135 (L) 136 - 145 mmol/L    Potassium 4.2 3.5 - 5.3 mmol/L    Chloride 101 98 - 107 mmol/L    Bicarbonate 23 21 - 32 mmol/L    Anion Gap 15 10 - 20 mmol/L    Urea Nitrogen 11 6 - 23 mg/dL    Creatinine 1.00 0.50 - 1.05 mg/dL    eGFR 76 >60 mL/min/1.73m*2    Calcium 8.2 (L) 8.6 - 10.6 mg/dL   Phosphorus   Result Value Ref Range    Phosphorus 2.0 (L) 2.5 - 4.9 mg/dL   POCT GLUCOSE   Result Value Ref Range    POCT Glucose 143 (H) 74 - 99 mg/dL   Prepare RBC: 1 Units, Leukocytes Reduced (CMV reduced risk)   Result Value Ref Range    PRODUCT CODE [id number]     Unit Number [id number]     Unit ABO O     Unit RH POS      XM INTEP COMP     Dispense Status XM     Blood Expiration Date April 25, 2024 23:59 EDT     PRODUCT BLOOD TYPE 5100     UNIT VOLUME 350      *Note: Due to a large number of results and/or encounters for the requested time period, some results have not been displayed. A complete set of results can be found in Results Review.        ASSESSMENT AND PLAN:    Ms. Yimi Bowles is a 34 y.o. female who underwent a kidney transplant surgery  on 3/31/2022 (Heart).    Principal Problem:    Cardiogenic shock (CMS/Formerly [facility])  Active Problems:    Heart transplant recipient (CMS/Formerly [facility])    ESRD (end stage renal disease) on dialysis (CMS/Formerly [facility])    HIRAM (acute kidney injury) (CMS/Formerly [facility])    Acute passive congestion of liver    Hyponatremia    Iron deficiency anemia    Abdominal pain    Cardiac transplant rejection (CMS/Formerly [facility])    Acute combined systolic (congestive) and diastolic (congestive) heart failure (CMS/Formerly [facility])      CRRT Management Note:    - Patient was seen and examined while on CVVH   - Lab was reviewed  - CVVH order was reviewed  -Discussed with primary team  -Discussed with RN   - Will continue CVVH for now  -Daily evaluation for indications for CVVH and will convert it to regular IHD once the patient is more hemodynamically stable.    Heart and Kidney transplant Candidacy :   Will meet the OPTN eligibility criteria for kidney transplant on 3/29/24 for sustained HIRAM over 6 weeks. Pre tx work up - per heart tx team.   Note:  GFR 2/17/24=23  CVVH started 2/19 and has remained on CVVH still  She is approved for CANDIDATE PENDING HEART TRANSPLANT APPROVAL at kidney transplant selection committee 3/28/24    [Sustained acute kidney injury : At least one of the following, or a combination of both of the following, for the last 6 weeks:    That the candidate has been on dialysis at least once every 7 days.    That the candidate has a measured or calculated CrCl or GFR less than or equal to 25 mL/min at least once every 7 days]      * Case was  discussed with primary team.  For questions, please contact transplant nephrology page x [phone]    Russ Bergeron    Transplant Nephrologist

## 2024-03-30 NOTE — PROGRESS NOTES
"Charla Bowles is a 34 y.o. female on day 44 of admission presenting with Cardiogenic shock (CMS/HCC), Patient deteriorated while on axillary 5.5 and was placed back on VA-ECMO on 3/27/24.      Objective     Physical Exam    Last Recorded Vitals  Blood pressure (!) 96/93, pulse 77, temperature 36.7 °C (98.1 °F), temperature source Core, resp. rate 25, height 1.549 m (5' 0.98\"), weight 96 kg (211 lb 10.3 oz), SpO2 100 %.  Intake/Output last 3 Shifts:  I/O last 3 completed shifts:  In: 1902.1 (19.8 mL/kg) [P.O.:100; I.V.:1117.1 (11.6 mL/kg); NG/GT:285; IV Piggyback:400]  Out: 7868 (82 mL/kg) [Other:7868]  Weight: 96 kg       Assessment/Plan   Principal Problem:    Cardiogenic shock (CMS/HCC)  Active Problems:    Heart transplant recipient (CMS/HCC)    ESRD (end stage renal disease) on dialysis (CMS/HCC)    HIRAM (acute kidney injury) (CMS/HCC)    Acute passive congestion of liver    Hyponatremia    Iron deficiency anemia    Abdominal pain    Systolic congestive heart failure (CMS/HCC)    Cardiac transplant rejection (CMS/HCC)    Acute combined systolic (congestive) and diastolic (congestive) heart failure (CMS/HCC)    History of cardiac transplantation and recent admission for heart failure progressing to cardiogenic shock currently supported with axillary 5.5 and femoral VA-ECMO, with concomitant renal failure supported with CRRT.     Will pull fluid via CRRT and work towards extubation - still with pulmonary edema and PEEP remains at 10. Reviewing with HF/CTS impella placement with thought to optimize impella flow and assist with pulmonary edema.   Plt of 30 noted. Monitor. Will re-send PF4.  Prophy/Immunosuppression per HF  Nose bleed mangement per ENT, on prophy Abx     Due to the high probability of life threatening clinical decompensation, the patient required critical care time evaluating and managing this patient.  Critical care time included obtaining a history, examining the patient, ordering and " reviewing studies, discussing, developing, and implementing a management plan, evaluating the patient's response to treatment, and discussion with other care team providers. I saw and evaluated the patient myself. I reviewed the provider's documentation and discussed the patient with the provider. Critical care time was performed exclusive of billable procedures.     Critical Care Time: 40 minutes   Quang Mehta MD

## 2024-03-30 NOTE — PROGRESS NOTES
CTICU Progress Note  Charla Bowles/[id number]    Admit Date: 2/15/2024  Hospital Length of Stay: 44   ICU Length of Stay: 2d 11h   CT SURGEON:     SUBJECTIVE:   Hgb 6.5 with downtrending plts  No overt evidence of bleeding  LDH uptrending    MEDICATIONS  Infusions:  dexmedeTOMIDine, Last Rate: 0.6 mcg/kg/hr (03/30/24 0600)  heparin, Last Rate: 400 Units/hr (03/30/24 0600)  heparin Impella Purge 25 units/mL in 500 mL D5W, Last Rate: 10 mL/hr (03/30/24 0600)  lactated Ringer's, Last Rate: 5 mL/hr (03/30/24 0600)  PrismaSol 4/2.5      Scheduled:  [Held by provider] acetaminophen, 650 mg, q4h  albumin human, 12.5 g, q6h  bisacodyl, 10 mg, Daily  calcium carbonate-vitamin D3, 1 tablet, Daily  ceFAZolin, 2 g, q8h  [Held by provider] cephalexin, 500 mg, q12h TRICIA  cyanocobalamin, 500 mcg, Daily  darbepoetin floyd, 100 mcg, q14 days  ferrous sulfate (325 mg ferrous sulfate), 65 mg of iron, Daily with breakfast  folic acid, 1 mg, Daily  insulin lispro, 0-15 Units, q4h  levothyroxine, 200 mcg, Daily  lidocaine, 1 patch, Daily  multivitamin with iron-minerals, 15 mL, Daily  multivitamin with minerals, 1 tablet, Daily  [Held by provider] mycophenolate, 180 mg, BID  nystatin, 5 mL, q6h  oxygen, , Continuous - Inhalation  pantoprazole, 40 mg, Daily  perflutren protein A microsphere, 0.5 mL, Once in imaging  perflutren protein A microsphere, 0.5 mL, Once in imaging  polyethylene glycol, 17 g, BID  predniSONE, 10 mg, Daily  sennosides-docusate sodium, 2 tablet, BID  sod phos di, mono-K phos mono, 250 mg, 4x daily  [Held by provider] sulfamethoxazole-trimethoprim, 80 mg of trimethoprim, Daily  sulfur hexafluoride microsphr, 2 mL, Once in imaging  sulfur hexafluoride microsphr, 2 mL, Once in imaging  tacrolimus, 0.5 mg, q24h  tacrolimus, 1 mg, q24h  [Held by provider] traZODone, 25 mg, Nightly  valGANciclovir, 450 mg, q48h      PRN:  calcium gluconate, 1 g, q6h PRN  calcium gluconate, 2 g, q6h PRN  dextrose, 25 g, q15 min  "PRN  dextrose, 25 g, q15 min PRN   Or  glucagon, 1 mg, q15 min PRN  dextrose, 25 g, q15 min PRN   Or  glucagon, 1 mg, q15 min PRN  glucagon, 1 mg, q15 min PRN  HYDROmorphone, 0.2 mg, q4h PRN  artificial tears, 2 drop, PRN  magnesium sulfate, 2 g, q6h PRN  magnesium sulfate, 4 g, q6h PRN  microfibrllar collagen, , PRN  mupirocin, , BID PRN  naloxone, 0.2 mg, q5 min PRN  ondansetron, 4 mg, q4h PRN  [Held by provider] oxyCODONE, 5 mg, q4h PRN  sodium chloride, 1 spray, 4x daily PRN        PHYSICAL EXAM:   Visit Vitals  BP (!) 112/105   Pulse 68   Temp 36.5 °C (97.7 °F) (Temporal)   Resp (!) 31   Ht 1.549 m (5' 0.98\")   Wt 96 kg (211 lb 10.3 oz)   SpO2 99%   BMI 40.01 kg/m²   OB Status Hysterectomy   Smoking Status Never   BSA 2.03 m²     Wt Readings from Last 5 Encounters:   03/26/24 96 kg (211 lb 10.3 oz)   12/07/23 92.1 kg (203 lb)   12/01/23 93 kg (205 lb)   11/29/23 92.9 kg (204 lb 12.8 oz)   11/09/23 91.3 kg (201 lb 3.2 oz)     INTAKE/OUTPUT:  I/O last 3 completed shifts:  In: 1902.1 (19.8 mL/kg) [P.O.:100; I.V.:1117.1 (11.6 mL/kg); NG/GT:285; IV Piggyback:400]  Out: 7868 (82 mL/kg) [Other:7868]  Weight: 96 kg          Vent settings:  Vent Mode: Assist control/Volume control plus  FiO2 (%):  [40 %-50 %] 40 %  S RR:  [10] 10  S VT:  [400 mL] 400 mL  PEEP/CPAP (cm H2O):  [8 cm H20-10 cm H20] 10 cm H20  MAP (cm H2O):  [11-22] 11    Physical Exam:   - CONSTITUTION: young appearing female intubated on ECMO with impella  - NEUROLOGIC: sedated but following commands in all extremities. CAM +  - CARDIOVASCULAR: NSR. R fem VA ECMO, no bleeding at site. R axillary impella without bleeding. Monophasic signals in LE  - RESPIRATORY: Intubated.  Course breath sounds. Thin, pink tinged secretions persisting  - GI: soft, hypoactive BS  - : anuric  - EXTREMITIES: all extremities warm. Significant edeam  - SKIN: left groin area of breakdown  - PSYCHIATRIC: JOSE    Daily Risk Screen  Intubated: plan to attempt to wean to extubate " if pulm edema improves  Central line: access  Singh: n/s    Images:     Invasive Hemodynamics:    Most Recent Range Past 24hrs   BP (Art) 82/72 Arterial Line BP 1  Min: 71/67  Max: 91/79   MAP(Art) 76 mmHg Arterial Line MAP 1 (mmHg)   Min: 69 mmHg  Max: 86 mmHg   RA/CVP   No data recorded   PA 41/34 No data recorded   PA(mean) 37 mmHg No data recorded   CO 5.5 L/min No data recorded   CI 2.8 L/min/m2 No data recorded   Mixed Venous 78.1 % SVO2 (%)  Min: 77.7 %  Max: 94.4 %   SVR  1115 (dyne*sec)/cm5 No data recorded         Assessment/Plan   34F with a PMHx sig for stage D HFrEF (PPCM) s/p ICD s/p CardioMEMs, hypothyroidism 2/2 thyroidectomy, obesity s/p gastric bypass (2017), and SLE who is s/p OHT (3/31/2022) with a post-OHT course complicated by RIJ/RUE DVTs, leukopenia, left groin seroma, and asymptomatic 2R ACR (11/2022) treated with steroids, s/p total hysterectomy (2023), Flu A (1/2024).     Originally presented to [facility] ED on 2/15/24 with complaints of N/V x 3 days and inability to keep medications down. Found to have acute systolic heart failure with primary graft failure and cardiogenic shock. Treated for suspected acute cellular vs. acute antibody mediated rejection; however, multiple cardiac biopsies negative for signs of rejection or other causes of graft failure. Course has been complicated by multiple MCS devices for refractory cardiogenic shock (right femoral IABP: 2/18/24 - 3/1/24, Left femoral VA ECMO: 2/19/24 - 2/29/24, Right axillary impella 5.5: 3/1/24 - remains in place, 2nd right femoral VA ECMO: 3/27/24 - remains in place), ARF requiring RRT, acute liver injury, intubation due to volume overload in setting of pulmonary edema, severe hemolysis 2/2 to impella malposition, mild CMV viremia, bilateral provoked IJ DVTs, multiple episodes of epistaxis & coagulopathies requiring ongoing blood product transfusions.        Transferred to CTICU on 3/27/24 following right femoral VA ECMO placement due  to worsening cardiogenic shock and multi-organ failure despite Impella 5.5 support and multiple inotropes.       Plan:  NEURO: No history. Previously neuro intact with acute pain and intermittent insomnia. Currently intubated and sedated on precedex infusion and following commands.->  - Serial neuro and pain assessments  - continue precedex  - hold tylenol with liver dysfunction  - continue lidoderm patch for back pain  - Continue oxy to 5mg Q4 PRN.   - continue dilaudid prn for now while intubated  - holding trazodone for sleep while on precedex  - PT/OT Consult  - CAM ICU score qshift. Sleep/wake cycle hygiene     ENT: Multiple episodes of epistaxis with interventions by ENT.  Most recently in OR 3/27 with L nare packed.  No current bleeding.  - appreciate ENT recs  - keflex while packing in place  - Prn ocean spray and mupiricin for dry nasal passages     Cardiac: Patient has a history of heart transplant in March of 2022 with primary graft dysfunction treated for acute cellular and antibody rejection without improvement with negative biopsy with multiple MCS devices for refractory cardiogenic shock (right femoral IABP: 2/18/24 - 3/1/24, Left femoral VA ECMO: 2/19/24 - 2/29/24, Right axillary impella 5.5: 3/1/24 - remains in place, 2nd right femoral VA ECMO: 3/27/24 - remains in place). Elevated LDH  uptrending again with multiple attempts at repositioning impella; ECHO 3/29 with impella potentially deep. ECMO ~3.7L flow/100%/sweep 1.5, impella 5.5 P6 with flows 2.6L with resolved lactate and improving end organ function. NSR and normotensive to hypertensive.   -->  - Maintain goal MAP 70-90  - continue full BiV support with impella and ECMO. No plan for attempts at weaning. Will be presented within the next week for kidney/heart transplant  - wean off epi and dobutamine  - Continue to hold home rosuvastatin 40mg daily with elevated LFTs  - add prn hydralazine for MAP >90  - discussed repositioning impella again  with cardiac surgery fellow  - Hold home amlodipine    Vascular: 3/27 arterial duplex with proximal SFA occlusin with collateral flow. C/f LLE ischemia (previous ECMO site) with loss of pulses- now monophasic with CK that has been normal. Feet warm with intact motor exam.   - appreciate vascular surgery following  - stop trending CK and follow exam     PULM: No history. Intubated multiple times throughout hospital course for procedures; intubated 3/27 for OR. Again with acute respiratory failure persisting due to acute pulmonary edema. Appropriate peak and plateau pressures on ACVC. ECMO sweep 1.5, FIO2 100% -->  - CXR daily  - pull negative on CVVH  - continue peep of 10 with pulmonary edema but pressure support trial today  - discussing optimizing impella flows to off load LV further  - Maintain SPO2 > 92%     GI: History of gastric bypass and MMF colitis. Shock liver originally resolved; most recently elevated r/t likely congestive hepatopathy that is improving on ECMO. Abdominal pain improved with reduced myfortic dosing. Previous c/f GI bleed, likely blood from epistaxis; no current evidence of GI bleeding. H pylori negative, fecal calprotectin (elevated at 380), fecal lactoferrin (Positive 03/23/24). Previously on oral diet. OG in place at present. Last BM 3/23 per documentation; intermittent constipation. KUB yesterday unremarkable.  -->  - Continue calcitriol 0.5mg daily, multivitamin, calcium carbonate 1,250mg BID  - continue PPI, reduced to daily with no evidence of current GI bleed  - avoiding placing dobhoff with epistaxis  - start TF if has BM today  - continue miralax BID & senna-colace BID with suppository    : No history, baseline Cr 1.2-1.3. HIRAM originally on CVVH then with renal recovery but then again worsened and now dialysis dependent and failed diuretic challenges. Hypervolemic still despite aggressive volume removal on CVVH-->  - RFP as clinically indicated, replete electrolytes per CTICU  protocol.   - continue CVVH for net negative  - schedule enteral phos for hypophosphatemia  - Nephrology consult     ENDO: History of hypothyroidism and prediabetes. TSH elevated originally to malabsorption then with dosing adjusted by endo; TSH (3/17): 20.74, T4 1.11, T3: 1.4. Steroid induced hyperglycemia acceptable glycemic control on SSI. -->  - Maintain BG <180 with hypoglycemia protocol  - Continue SSI  - Continue Synthroid to 200mcg daily.   - Endocrine following, touch base Monday if any further monitoring needed     HEME: History of iron deficiency anemia and remote DVT; again with acute DVT LIJ and SVT left cephalic vein and right cephalic vein. Acute blood loss anemia with repeated transfusions with significant hemolysis but no evidence of active bleeding. Thrombocytopenia downtrending further since being on ECMO; last PF4 negative 3/25.  Coagulopathy resolved. Heparin therapeutic. -->    - 1 pRBC and repeat CBC this afternoon  - Continue ferrous sulfate daily  - holding ASA for CAD prevention with recent thrombocytopenia  - dropping heparin to 300units/hr for goal assay of 0.3  - continue heparin purge for impella  - repeat PF4 again today  - SCDs and for DVT prophylaxis  - Aranesp every 2 weeks  - Last type and screen: 3/28, resend tomorrow     Rejection/prophylaxis: S/p heart transplant 3/31/2022. Induction basiliximab. Donor HCV -, toxo -/-, CMV -/+. Mild ACR with +CD4 and negative HLAs however clinical presentation concern for acute cellular vs AMR rejection/stuttering rejection completed courses of thymo/PLEX/IVIG without clinical improvement.   - Steroids: Continue 10mg PO prednisone daily  - Tacrolimus: 0.5mg/1mg w/ daily levels drawn @ 0600  - Tacrolimus goal troughs: 8-10  - Myfortic acid: 180mg BID, holding since unable to crush.  Not starting MMF d/t hx of colitis  - Antifungals: nystatin 500,000units Q6  - Antivirals: valcyte 450mg Q48hrs   - Anti PCP & Toxoplasmosis: holding Bactrim SS daily  due to thrombocytopenia     ID: Afebrile. C/f previous yeast infection. BC 3/27 NGTD x2 days. MRSA screen negative 3/28.   -->  - Trend temp q4h  - resume keflex while nasal packing in place via OG  - f/u blood cx     Skin: No active skin issues. Left groin wound from previous ecmo with wound consulted. Wound cx with NG 3/15.   - Preventative Mepilex dressings in place on sacrum and heels  - Change preventative Mepilex weekly or more frequently as indicated (when moist/soiled)   - Every shift skin assessment per nursing and weekly ICU skin rounds  - Moisture barrier to be applied with christopher care  - Active skin problems addressed with nursing on daily rounds  - wound recs from note 3/15 ordered      Proph:  SCDs  heparin  PPI     G: Lines  RIJ Trialysis - 3/5. Limited options for replacement with DVTs.  Will consider line holiday if off pressors and tolerating CVVH through ECMO circuit  R radial maxwell 3/27  PIV x2     Restraints: The indications and risks/benefits of non-violent/non self-destructive restraints were discussed and are indicated for the safety of the patient.     Code status: Full Code     I personally spent 60 minutes of critical care time directly and personally managing the patient exclusive of separately billable procedures.     A,B,C,D,E,F,G: reviewed     Discussed with Dr. Mehta  Dispo: CTICU care for now.    CTICU TEAM PHONE [phone]

## 2024-03-30 NOTE — CARE PLAN
Problem: Safety - Medical Restraint  Goal: Remains free of injury from restraints (Restraint for Interference with Medical Device)  Outcome: Progressing  Goal: Free from restraint(s) (Restraint for Interference with Medical Device)  Outcome: Progressing

## 2024-03-31 NOTE — CARE PLAN
Problem: Fall/Injury  Goal: Be free from injury by end of the shift  Outcome: Progressing     Problem: Safety - Medical Restraint  Goal: Remains free of injury from restraints (Restraint for Interference with Medical Device)  Outcome: Progressing  Goal: Free from restraint(s) (Restraint for Interference with Medical Device)  Outcome: Progressing

## 2024-03-31 NOTE — PROGRESS NOTES
"Charla Bowles is a 34 y.o. female on day 45 of admission presenting with Cardiogenic shock (CMS/HCC).      Objective     Physical Exam    Last Recorded Vitals  Blood pressure 78/74, pulse 82, temperature 36.6 °C (97.9 °F), temperature source Temporal, resp. rate 12, height 1.549 m (5' 0.98\"), weight 96 kg (211 lb 10.3 oz), SpO2 100 %.  Intake/Output last 3 Shifts:  I/O last 3 completed shifts:  In: 3015.9 (31.4 mL/kg) [I.V.:1235.9 (12.9 mL/kg); Blood:750; NG/GT:430; IV Piggyback:600]  Out: 7586 (79 mL/kg) [Other:7586]  Weight: 96 kg     Relevant Results  Results for orders placed or performed during the hospital encounter of 02/15/24 (from the past 24 hour(s))   Blood Gas Arterial Full Panel   Result Value Ref Range    POCT pH, Arterial 7.38 7.38 - 7.42 pH    POCT pCO2, Arterial 41 38 - 42 mm Hg    POCT pO2, Arterial 414 (H) 85 - 95 mm Hg    POCT SO2, Arterial 100 94 - 100 %    POCT Oxy Hemoglobin, Arterial 94.1 94.0 - 98.0 %    POCT Hematocrit Calculated, Arterial 23.0 (L) 36.0 - 46.0 %    POCT Sodium, Arterial 133 (L) 136 - 145 mmol/L    POCT Potassium, Arterial 4.4 3.5 - 5.3 mmol/L    POCT Chloride, Arterial 103 98 - 107 mmol/L    POCT Ionized Calcium, Arterial 1.13 1.10 - 1.33 mmol/L    POCT Glucose, Arterial 159 (H) 74 - 99 mg/dL    POCT Lactate, Arterial 0.5 0.4 - 2.0 mmol/L    POCT Base Excess, Arterial -0.8 -2.0 - 3.0 mmol/L    POCT HCO3 Calculated, Arterial 24.3 22.0 - 26.0 mmol/L    POCT Hemoglobin, Arterial 7.8 (L) 12.0 - 16.0 g/dL    POCT Anion Gap, Arterial 10 10 - 25 mmo/L    Patient Temperature 37.0 degrees Celsius    FiO2 40 %   Calcium, Ionized   Result Value Ref Range    POCT Calcium, Ionized 1.11 1.1 - 1.33 mmol/L   Coagulation Screen   Result Value Ref Range    Protime 13.7 (H) 9.8 - 12.8 seconds    INR 1.2 (H) 0.9 - 1.1    aPTT 93 (H) 27 - 38 seconds   Heparin Assay, UFH   Result Value Ref Range    Heparin Unfractionated 0.5 See Comment Below for Therapeutic Ranges IU/mL   Hepatic " function panel   Result Value Ref Range    Albumin 3.8 3.4 - 5.0 g/dL    Bilirubin, Total 3.8 (H) 0.0 - 1.2 mg/dL    Bilirubin, Direct 1.3 (H) 0.0 - 0.3 mg/dL    Alkaline Phosphatase 140 (H) 33 - 110 U/L    ALT 41 7 - 45 U/L     (H) 9 - 39 U/L    Total Protein 5.8 (L) 6.4 - 8.2 g/dL   Magnesium   Result Value Ref Range    Magnesium 2.39 1.60 - 2.40 mg/dL   Basic Metabolic Panel   Result Value Ref Range    Glucose 164 (H) 74 - 99 mg/dL    Sodium 135 (L) 136 - 145 mmol/L    Potassium 4.2 3.5 - 5.3 mmol/L    Chloride 101 98 - 107 mmol/L    Bicarbonate 23 21 - 32 mmol/L    Anion Gap 15 10 - 20 mmol/L    Urea Nitrogen 11 6 - 23 mg/dL    Creatinine 1.00 0.50 - 1.05 mg/dL    eGFR 76 >60 mL/min/1.73m*2    Calcium 8.2 (L) 8.6 - 10.6 mg/dL   Phosphorus   Result Value Ref Range    Phosphorus 2.0 (L) 2.5 - 4.9 mg/dL   POCT GLUCOSE   Result Value Ref Range    POCT Glucose 143 (H) 74 - 99 mg/dL   Prepare RBC: 1 Units, Leukocytes Reduced (CMV reduced risk)   Result Value Ref Range    PRODUCT CODE X3446O01     Unit Number [id number]     Unit ABO O     Unit RH POS     XM INTEP COMP     Dispense Status XM     Blood Expiration Date April 25, 2024 23:59 EDT     PRODUCT BLOOD TYPE 5100     UNIT VOLUME 350    CBC   Result Value Ref Range    WBC 5.4 4.4 - 11.3 x10*3/uL    nRBC 7.7 (H) 0.0 - 0.0 /100 WBCs    RBC 2.94 (L) 4.00 - 5.20 x10*6/uL    Hemoglobin 8.5 (L) 12.0 - 16.0 g/dL    Hematocrit 25.0 (L) 36.0 - 46.0 %    MCV 85 80 - 100 fL    MCH 28.9 26.0 - 34.0 pg    MCHC 34.0 32.0 - 36.0 g/dL    RDW 20.8 (H) 11.5 - 14.5 %    Platelets 51 (L) 150 - 450 x10*3/uL   POCT GLUCOSE   Result Value Ref Range    POCT Glucose 160 (H) 74 - 99 mg/dL   POCT GLUCOSE   Result Value Ref Range    POCT Glucose 124 (H) 74 - 99 mg/dL   Blood Gas Arterial Full Panel   Result Value Ref Range    POCT pH, Arterial 7.28 (L) 7.38 - 7.42 pH    POCT pCO2, Arterial 59 (H) 38 - 42 mm Hg    POCT pO2, Arterial 413 (H) 85 - 95 mm Hg    POCT SO2, Arterial 100  94 - 100 %    POCT Oxy Hemoglobin, Arterial 94.4 94.0 - 98.0 %    POCT Hematocrit Calculated, Arterial 26.0 (L) 36.0 - 46.0 %    POCT Sodium, Arterial 131 (L) 136 - 145 mmol/L    POCT Potassium, Arterial 4.7 3.5 - 5.3 mmol/L    POCT Chloride, Arterial 103 98 - 107 mmol/L    POCT Ionized Calcium, Arterial 1.10 1.10 - 1.33 mmol/L    POCT Glucose, Arterial 148 (H) 74 - 99 mg/dL    POCT Lactate, Arterial 0.6 0.4 - 2.0 mmol/L    POCT Base Excess, Arterial 0.4 -2.0 - 3.0 mmol/L    POCT HCO3 Calculated, Arterial 27.7 (H) 22.0 - 26.0 mmol/L    POCT Hemoglobin, Arterial 8.8 (L) 12.0 - 16.0 g/dL    POCT Anion Gap, Arterial 5 (L) 10 - 25 mmo/L    Patient Temperature 37.0 degrees Celsius    FiO2 40 %   Reticulocytes   Result Value Ref Range    Retic % 3.5 (H) 0.5 - 2.0 %    Retic Absolute 0.102 (H) 0.018 - 0.083 x10*6/uL    Reticulocyte Hemoglobin 32 28 - 38 pg    Immature Retic fraction 40.4 (H) <=16.0 %   Haptoglobin   Result Value Ref Range    Haptoglobin <10 (L) 37 - 246 mg/dL   CBC and Auto Differential   Result Value Ref Range    WBC 5.5 4.4 - 11.3 x10*3/uL    nRBC 8.6 (H) 0.0 - 0.0 /100 WBCs    RBC 2.91 (L) 4.00 - 5.20 x10*6/uL    Hemoglobin 8.2 (L) 12.0 - 16.0 g/dL    Hematocrit 25.5 (L) 36.0 - 46.0 %    MCV 88 80 - 100 fL    MCH 28.2 26.0 - 34.0 pg    MCHC 32.2 32.0 - 36.0 g/dL    RDW 21.8 (H) 11.5 - 14.5 %    Platelets 23 (LL) 150 - 450 x10*3/uL    Immature Granulocytes %, Automated 6.6 (H) 0.0 - 0.9 %    Immature Granulocytes Absolute, Automated 0.36 0.00 - 0.70 x10*3/uL   ECMO Venous Blood Gas   Result Value Ref Range    POCT pH, Venous ECMO 7.23 Reference range not established pH    POCT pCO2, Venous ECMO 71 Reference range not established mm Hg    POCT pO2,  Venous  ECMO 48 Reference range not established mm Hg    POCT SO2, Venous ECMO 84 Reference range not established %    POCT Oxy Hemoglobin, Venous ECMO 79.3 Reference range not established %    POCT Base Excess, Venous ECMO 0.2 Reference range not established  mmol/L    POCT HCO3 Calculated, Venous ECMO 29.7 Reference range not established mmol/L    Patient Temperature 37.0 degrees Celsius    FiO2 40 %   Lactate Dehydrogenase   Result Value Ref Range    LDH 3,038 (H) 84 - 246 U/L   Calcium, Ionized   Result Value Ref Range    POCT Calcium, Ionized 1.08 (L) 1.1 - 1.33 mmol/L   Hepatic function panel   Result Value Ref Range    Albumin 3.6 3.4 - 5.0 g/dL    Bilirubin, Total 3.9 (H) 0.0 - 1.2 mg/dL    Bilirubin, Direct 1.3 (H) 0.0 - 0.3 mg/dL    Alkaline Phosphatase 165 (H) 33 - 110 U/L    ALT 34 7 - 45 U/L     (H) 9 - 39 U/L    Total Protein 5.7 (L) 6.4 - 8.2 g/dL   Coagulation Screen   Result Value Ref Range    Protime 13.1 (H) 9.8 - 12.8 seconds    INR 1.2 (H) 0.9 - 1.1    aPTT 52 (H) 27 - 38 seconds   Magnesium   Result Value Ref Range    Magnesium 2.52 (H) 1.60 - 2.40 mg/dL   Manual Differential   Result Value Ref Range    Neutrophils %, Manual 89.3 40.0 - 80.0 %    Lymphocytes %, Manual 4.4 13.0 - 44.0 %    Monocytes %, Manual 4.5 2.0 - 10.0 %    Eosinophils %, Manual 1.8 0.0 - 6.0 %    Basophils %, Manual 0.0 0.0 - 2.0 %    Seg Neutrophils Absolute, Manual 4.91 1.20 - 7.00 x10*3/uL    Lymphocytes Absolute, Manual 0.24 (L) 1.20 - 4.80 x10*3/uL    Monocytes Absolute, Manual 0.25 0.10 - 1.00 x10*3/uL    Eosinophils Absolute, Manual 0.10 0.00 - 0.70 x10*3/uL    Basophils Absolute, Manual 0.00 0.00 - 0.10 x10*3/uL    Total Cells Counted 112     RBC Morphology See Below     RBC Fragments Few     [place] Cells Few     Acanthocytes Few    Blood Gas Arterial Full Panel   Result Value Ref Range    POCT pH, Arterial 7.43 (H) 7.38 - 7.42 pH    POCT pCO2, Arterial 35 (L) 38 - 42 mm Hg    POCT pO2, Arterial 422 (H) 85 - 95 mm Hg    POCT SO2, Arterial 100 94 - 100 %    POCT Oxy Hemoglobin, Arterial 94.8 94.0 - 98.0 %    POCT Hematocrit Calculated, Arterial 27.0 (L) 36.0 - 46.0 %    POCT Sodium, Arterial 132 (L) 136 - 145 mmol/L    POCT Potassium, Arterial 4.2 3.5 - 5.3 mmol/L     POCT Chloride, Arterial 107 98 - 107 mmol/L    POCT Ionized Calcium, Arterial 1.00 (L) 1.10 - 1.33 mmol/L    POCT Glucose, Arterial 132 (H) 74 - 99 mg/dL    POCT Lactate, Arterial 0.7 0.4 - 2.0 mmol/L    POCT Base Excess, Arterial -0.9 -2.0 - 3.0 mmol/L    POCT HCO3 Calculated, Arterial 23.2 22.0 - 26.0 mmol/L    POCT Hemoglobin, Arterial 9.0 (L) 12.0 - 16.0 g/dL    POCT Anion Gap, Arterial 6 (L) 10 - 25 mmo/L    Patient Temperature 37.0 degrees Celsius    FiO2 40 %   Blood Gas Arterial Full Panel   Result Value Ref Range    POCT pH, Arterial 7.66 (HH) 7.38 - 7.42 pH    POCT pCO2, Arterial 21 (L) 38 - 42 mm Hg    POCT pO2, Arterial 252 (H) 85 - 95 mm Hg    POCT SO2, Arterial 100 94 - 100 %    POCT Oxy Hemoglobin, Arterial 94.2 94.0 - 98.0 %    POCT Hematocrit Calculated, Arterial 29.0 (L) 36.0 - 46.0 %    POCT Sodium, Arterial 132 (L) 136 - 145 mmol/L    POCT Potassium, Arterial 4.4 3.5 - 5.3 mmol/L    POCT Chloride, Arterial 102 98 - 107 mmol/L    POCT Ionized Calcium, Arterial 1.09 (L) 1.10 - 1.33 mmol/L    POCT Glucose, Arterial 111 (H) 74 - 99 mg/dL    POCT Lactate, Arterial 1.0 0.4 - 2.0 mmol/L    POCT Base Excess, Arterial 3.8 (H) -2.0 - 3.0 mmol/L    POCT HCO3 Calculated, Arterial 23.7 22.0 - 26.0 mmol/L    POCT Hemoglobin, Arterial 9.5 (L) 12.0 - 16.0 g/dL    POCT Anion Gap, Arterial 11 10 - 25 mmo/L    Patient Temperature 37.0 degrees Celsius    FiO2 40 %   Renal Function Panel   Result Value Ref Range    Glucose 115 (H) 74 - 99 mg/dL    Sodium 135 (L) 136 - 145 mmol/L    Potassium 4.4 3.5 - 5.3 mmol/L    Chloride 101 98 - 107 mmol/L    Bicarbonate 23 21 - 32 mmol/L    Anion Gap 15 10 - 20 mmol/L    Urea Nitrogen 14 6 - 23 mg/dL    Creatinine 0.92 0.50 - 1.05 mg/dL    eGFR 84 >60 mL/min/1.73m*2    Calcium 8.5 (L) 8.6 - 10.6 mg/dL    Phosphorus 1.0 (L) 2.5 - 4.9 mg/dL    Albumin 3.7 3.4 - 5.0 g/dL   Tacrolimus level   Result Value Ref Range    Tacrolimus  5.0 <=15.0 ng/mL   Blood Gas Arterial Full Panel    Result Value Ref Range    POCT pH, Arterial 7.67 (HH) 7.38 - 7.42 pH    POCT pCO2, Arterial 20 (L) 38 - 42 mm Hg    POCT pO2, Arterial 212 (H) 85 - 95 mm Hg    POCT SO2, Arterial 100 94 - 100 %    POCT Oxy Hemoglobin, Arterial 94.2 94.0 - 98.0 %    POCT Hematocrit Calculated, Arterial 27.0 (L) 36.0 - 46.0 %    POCT Sodium, Arterial 134 (L) 136 - 145 mmol/L    POCT Potassium, Arterial 4.2 3.5 - 5.3 mmol/L    POCT Chloride, Arterial 105 98 - 107 mmol/L    POCT Ionized Calcium, Arterial 1.11 1.10 - 1.33 mmol/L    POCT Glucose, Arterial 133 (H) 74 - 99 mg/dL    POCT Lactate, Arterial 1.0 0.4 - 2.0 mmol/L    POCT Base Excess, Arterial 3.4 (H) -2.0 - 3.0 mmol/L    POCT HCO3 Calculated, Arterial 23.1 22.0 - 26.0 mmol/L    POCT Hemoglobin, Arterial 9.0 (L) 12.0 - 16.0 g/dL    POCT Anion Gap, Arterial 10 10 - 25 mmo/L    Patient Temperature 37.0 degrees Celsius    FiO2 100 %   POCT GLUCOSE   Result Value Ref Range    POCT Glucose 140 (H) 74 - 99 mg/dL   Prepare Platelets: 1 Units, Leukocytes Reduced (CMV reduced risk)   Result Value Ref Range    PRODUCT CODE [id number]     Unit Number [id number]     Unit ABO AB     Unit RH POS     Dispense Status TR     Blood Expiration Date March 31, 2024 23:59 EDT     PRODUCT BLOOD TYPE 8400     UNIT VOLUME 266    Type And Screen   Result Value Ref Range    ABO TYPE O     Rh TYPE POS     ANTIBODY SCREEN NEG    Blood Gas Arterial Full Panel   Result Value Ref Range    POCT pH, Arterial 7.58 (H) 7.38 - 7.42 pH    POCT pCO2, Arterial 24 (L) 38 - 42 mm Hg    POCT pO2, Arterial 168 (H) 85 - 95 mm Hg    POCT SO2, Arterial 100 94 - 100 %    POCT Oxy Hemoglobin, Arterial 94.0 94.0 - 98.0 %    POCT Hematocrit Calculated, Arterial 27.0 (L) 36.0 - 46.0 %    POCT Sodium, Arterial 132 (L) 136 - 145 mmol/L    POCT Potassium, Arterial 3.9 3.5 - 5.3 mmol/L    POCT Chloride, Arterial 105 98 - 107 mmol/L    POCT Ionized Calcium, Arterial 1.07 (L) 1.10 - 1.33 mmol/L    POCT Glucose, Arterial 125 (H) 74  - 99 mg/dL    POCT Lactate, Arterial 0.7 0.4 - 2.0 mmol/L    POCT Base Excess, Arterial 1.2 -2.0 - 3.0 mmol/L    POCT HCO3 Calculated, Arterial 22.5 22.0 - 26.0 mmol/L    POCT Hemoglobin, Arterial 9.0 (L) 12.0 - 16.0 g/dL    POCT Anion Gap, Arterial 8 (L) 10 - 25 mmo/L    Patient Temperature 37.0 degrees Celsius    FiO2 30 %   Blood Gas Arterial Full Panel   Result Value Ref Range    POCT pH, Arterial 7.54 (H) 7.38 - 7.42 pH    POCT pCO2, Arterial 24 (L) 38 - 42 mm Hg    POCT pO2, Arterial 146 (H) 85 - 95 mm Hg    POCT SO2, Arterial 100 94 - 100 %    POCT Oxy Hemoglobin, Arterial 94.4 94.0 - 98.0 %    POCT Hematocrit Calculated, Arterial 23.0 (L) 36.0 - 46.0 %    POCT Sodium, Arterial 135 (L) 136 - 145 mmol/L    POCT Potassium, Arterial 3.7 3.5 - 5.3 mmol/L    POCT Chloride, Arterial 109 (H) 98 - 107 mmol/L    POCT Ionized Calcium, Arterial 1.08 (L) 1.10 - 1.33 mmol/L    POCT Glucose, Arterial 122 (H) 74 - 99 mg/dL    POCT Lactate, Arterial 0.3 (L) 0.4 - 2.0 mmol/L    POCT Base Excess, Arterial -1.5 -2.0 - 3.0 mmol/L    POCT HCO3 Calculated, Arterial 20.5 (L) 22.0 - 26.0 mmol/L    POCT Hemoglobin, Arterial 7.8 (L) 12.0 - 16.0 g/dL    POCT Anion Gap, Arterial 9 (L) 10 - 25 mmo/L    Patient Temperature 37.0 degrees Celsius    FiO2 30 %   ECMO ARTERIAL FULL PANEL   Result Value Ref Range    POCT pH, Arterial ECMO 7.29 Reference range not established pH    POCT pCO2, Arterial ECMO 57 Reference range not established mm Hg    POCT pO2,  Arterial ECMO 455 Reference range not established mm Hg    POCT SO2, Arterial ECMO 100 Reference range not established %    POCT Oxy Hemoglobin, Arterial ECMO 94.3 Reference range not established %    POCT Hematocrit Calculated, Arterial ECMO 26.0 (L) 36.0 - 46.0 %    POCT Sodium, Arterial  ECMO 134 (L) 136 - 145 mmol/L    POCT Potassium, Arterial  ECMO 4.6 3.5 - 5.3 mmol/L    POCT Chloride, Arterial  ECMO 102 98 - 107 mmol/L    POCT Ionized Calcium, Arterial  ECMO 1.15 1.10 - 1.33 mmol/L     POCT Glucose, Arterial  ECMO 161 (H) 74 - 99 mg/dL    POCT Lactate Arterial  ECMO 0.4 0.4 - 2.0 mmol/L    POCT Base Excess, Arterial ECMO 0.3 Reference range not established mmol/L    POCT HCO3 Calculated, Arterial ECMO 27.4 Reference range not established mmol/L    POCT Hemoglobin, Arterial  ECMO 8.8 (L) 12.0 - 16.0 g/dL    POCT Anion Gap, Arterial  ECMO 9 Reference range not established mmo/L    Patient Temperature 37.0 degrees Celsius    FiO2 100 %   ECMO VENOUS FULL PANEL   Result Value Ref Range    POCT pH, Venous ECMO 7.30 Reference range not established pH    POCT pCO2, Venous ECMO 58 Reference range not established mm Hg    POCT pO2,  Venous  ECMO 52 Reference range not established mm Hg    POCT SO2, Venous ECMO 88 Reference range not established %    POCT Oxy Hemoglobin, Venous ECMO 81.8 Reference range not established %    POCT Hematocrit Calculated, Venous ECMO 26.0 (L) 36.0 - 46.0 %    POCT Sodium, Venous ECMO 134 (L) 136 - 145 mmol/L    POCT Potassium, Venous ECMO 4.7 3.5 - 5.3 mmol/L    POCT Chloride, Venous ECMO 102 98 - 107 mmol/L    POCT Ionized Calcium, Venous ECMO 1.16 1.10 - 1.33 mmol/L    POCT Glucose, Venous ECMO 159 (H) 74 - 99 mg/dL    POCT Lactate Venous ECMO 0.4 0.4 - 2.0 mmol/L    POCT Base Excess, Venous ECMO 1.5 Reference range not established mmol/L    POCT HCO3 Calculated, Venous ECMO 28.5 Reference range not established mmol/L    POCT Hemoglobin, Venous ECMO 8.6 (L) 12.0 - 16.0 g/dL    POCT Anion Gap, Venous ECMO 8 Reference range not established mmo/L    Patient Temperature 37.0 degrees Celsius    FiO2 100 %   Blood Gas Arterial Full Panel   Result Value Ref Range    POCT pH, Arterial 7.40 7.38 - 7.42 pH    POCT pCO2, Arterial 40 38 - 42 mm Hg    POCT pO2, Arterial 123 (H) 85 - 95 mm Hg    POCT SO2, Arterial 100 94 - 100 %    POCT Oxy Hemoglobin, Arterial 93.6 (L) 94.0 - 98.0 %    POCT Hematocrit Calculated, Arterial 26.0 (L) 36.0 - 46.0 %    POCT Sodium, Arterial 133 (L) 136 -  145 mmol/L    POCT Potassium, Arterial 4.5 3.5 - 5.3 mmol/L    POCT Chloride, Arterial 103 98 - 107 mmol/L    POCT Ionized Calcium, Arterial 1.14 1.10 - 1.33 mmol/L    POCT Glucose, Arterial 158 (H) 74 - 99 mg/dL    POCT Lactate, Arterial 0.5 0.4 - 2.0 mmol/L    POCT Base Excess, Arterial 0.0 -2.0 - 3.0 mmol/L    POCT HCO3 Calculated, Arterial 24.8 22.0 - 26.0 mmol/L    POCT Hemoglobin, Arterial 8.8 (L) 12.0 - 16.0 g/dL    POCT Anion Gap, Arterial 10 10 - 25 mmo/L    Patient Temperature 37.0 degrees Celsius    FiO2 30 %   POCT GLUCOSE   Result Value Ref Range    POCT Glucose 161 (H) 74 - 99 mg/dL   ACTIVATED CLOTTING TIME LOW   Result Value Ref Range    POCT Activated Clotting Time Low Range 205 (H) 83 - 199 sec     *Note: Due to a large number of results and/or encounters for the requested time period, some results have not been displayed. A complete set of results can be found in Results Review.      Assessment/Plan   Principal Problem:    Cardiogenic shock (CMS/[facility])  Active Problems:    Heart transplant recipient (CMS/[facility])    ESRD (end stage renal disease) on dialysis (CMS/[facility])    HIRAM (acute kidney injury) (CMS/[facility])    Acute passive congestion of liver    Hyponatremia    Iron deficiency anemia    Abdominal pain    Systolic congestive heart failure (CMS/[facility])    Cardiac transplant rejection (CMS/[facility])    Acute combined systolic (congestive) and diastolic (congestive) heart failure (CMS/[facility])    Patient presented to [facility] ED on 2/15/24 with acute heart failure, treated for rejection and supported with MCS. Most recently she developed shock while supported with right axillary impella 5.5 as well as CRRT and was placed back on peripheral [facility]-ECMO on 3/27/24, also intubated at that time.    Continue ECMO/Impella support, pulling fluid negative and will wean PEEP (pulmonary edema) as able towards SAT/SBT. Ongoing hematologic support as needed for most notably anemia (no signs active bleeding, nasal packing in place) and  thrombocytopenia (PF4-). Have reviewed Impella positioning with CTS. She does not appear actively infected and is on abx prophylaxis given nasal packing.    Renal transplant has evaluated->notes reviewed and cardiac transplant to review this Tuesday.    Immunosuppression per Tx.    Due to the high probability of life threatening clinical decompensation, the patient required critical care time evaluating and managing this patient.  Critical care time included obtaining a history, examining the patient, ordering and reviewing studies, discussing, developing, and implementing a management plan, evaluating the patient's response to treatment, and discussion with other care team providers. I saw and evaluated the patient myself. I reviewed the provider's documentation and discussed the patient with the provider. Critical care time was performed exclusive of billable procedures.    Critical Care Time: 38 minutes        Quang Mehta MD

## 2024-03-31 NOTE — PROGRESS NOTES
[facility]  ADVANCED HEART FAILURE AND TRANSPLANT CARDIOLOGY   Charla Bowles/[id number]    Admit Date: 2/15/2024  Hospital Length of Stay: 45   ICU Length of Stay: 3d 18h   Primary Service: CTICU      Charla Bowles is a 34 y.o. female on day 45 of admission presenting with Cardiogenic shock (CMS/HCC).    Subjective   Patient remains hemodynamically supported with VA ECMO and Impella. Yesterday cardiac surgery team assessed Impella placement which appeared adequate and did not require repositioning, instead opted to decrease Impella flow to assist with hemolysis. She failed CPAP yesterday, remains intubated with PEEP increased to 10, CXR with improving pulmonary edema.     Objective     Physical Exam  Constitutional:       Appearance: She is ill-appearing.   HENT:      Head: Normocephalic.      Comments: Not actively bleeding      Mouth/Throat:      Mouth: Mucous membranes are dry.      Pharynx: Oropharynx is clear. No oropharyngeal exudate or posterior oropharyngeal erythema.   Eyes:      Extraocular Movements: Extraocular movements intact.      Conjunctiva/sclera: Conjunctivae normal.      Pupils: Pupils are equal, round, and reactive to light.   Neck:      Vascular: No JVD.   Cardiovascular:      Rate and Rhythm: Tachycardia present.      Comments: Right axillary impella in place ~45cm at hub (no hematoma), Right femoral VA ECMO in place with reperfusion catheter (site appears clean and dry). Dopplerable radial and ulnar pulses bilaterally. Unable to doppler LLE DP/PT/popliteal pulses.  Lower extremities warm.   Pulmonary:      Effort: Pulmonary effort is normal. No accessory muscle usage, respiratory distress or retractions.      Breath sounds: No wheezing, rhonchi or rales.      Comments: Intubated via ETT. Chest wall moving symmetrically, minimal inline secretions.    Abdominal:      General: Abdomen is flat. Bowel sounds are normal. There is no distension.       "Palpations: Abdomen is soft. There is no mass.      Hernia: No hernia is present.      Comments: OG in place   Musculoskeletal:         General: No swelling or tenderness. Normal range of motion.      Cervical back: Full passive range of motion without pain.   Skin:     General: Skin is dry.      Capillary Refill: Capillary refill takes less than 2 seconds.      Coloration: Skin is not jaundiced.      Findings: Bruising and lesion present. No erythema, laceration, rash or wound.   Neurological:      General: No focal deficit present.      Mental Status: She is alert.      Comments: Sedated on Precedex. Drowsy but able to communicate and answer yes/no questions   Fcx4   MAEx4          Last Recorded Vitals  Blood pressure 78/74, pulse 79, temperature 36.4 °C (97.5 °F), temperature source Temporal, resp. rate 12, height 1.549 m (5' 0.98\"), weight 96 kg (211 lb 10.3 oz), SpO2 100 %.  Intake/Output last 3 Shifts:  I/O last 3 completed shifts:  In: 3015.9 (31.4 mL/kg) [I.V.:1235.9 (12.9 mL/kg); Blood:750; NG/GT:430; IV Piggyback:600]  Out: 7586 (79 mL/kg) [Other:7586]  Weight: 96 kg     Relevant Results  Scheduled medications  acetaminophen, 650 mg, oral, q4h  calcium carbonate-vitamin D3, 1 tablet, oral, Daily  cephalexin, 500 mg, oral, q12h TRICIA  cyanocobalamin, 500 mcg, oral, Daily  darbepoetin floyd, 100 mcg, subcutaneous, q14 days  ferrous sulfate (325 mg ferrous sulfate), 65 mg of iron, oral, Daily with breakfast  folic acid, 1 mg, oral, Daily  hydrALAZINE, 10 mg, oral, q8h  insulin lispro, 0-15 Units, subcutaneous, q4h  levothyroxine, 200 mcg, oral, Daily  lidocaine, 1 patch, transdermal, Daily  multivitamin with iron-minerals, 15 mL, oral, Daily  multivitamin with minerals, 1 tablet, oral, Daily  [Held by provider] mycophenolate, 180 mg, oral, BID  nystatin, 5 mL, Swish & Swallow, q6h  oxygen, , inhalation, Continuous - Inhalation  pantoprazole, 40 mg, intravenous, Daily  polyethylene glycol, 17 g, oral, " BID  potassium, sodium phosphates, 2 packet, oral, q8h  predniSONE, 10 mg, oral, Daily  sennosides-docusate sodium, 2 tablet, oral, BID  sodium phosphate, 30 mmol, intravenous, Once  [Held by provider] sulfamethoxazole-trimethoprim, 80 mg of trimethoprim, oral, Daily  tacrolimus, 1.5 mg, oral, q24h  tacrolimus, 1.5 mg, oral, q24h  traZODone, 25 mg, oral, Nightly  valGANciclovir, 450 mg, oral, q48h      Continuous medications  dexmedeTOMIDine, 0.1-1.5 mcg/kg/hr, Last Rate: 0.8 mcg/kg/hr (03/31/24 1251)  [Held by provider] heparin, 0-4,000 Units/hr, Last Rate: Stopped (03/30/24 1435)  lactated Ringer's, 5 mL/hr, Last Rate: 5 mL/hr (03/31/24 1100)  PrismaSol 4/2.5, 25 mL/kg/hr  sodium bicarbonate infusion Impella Purge 25 mEq/1000 mL D5W, 10 mL/hr      PRN medications  PRN medications: bisacodyl, calcium gluconate, calcium gluconate, dextrose, dextrose **OR** glucagon, dextrose **OR** glucagon, glucagon, hydrALAZINE, HYDROmorphone, artificial tears, magnesium sulfate, magnesium sulfate, microfibrllar collagen, mupirocin, naloxone, ondansetron, oxyCODONE, sodium chloride     Results for orders placed or performed during the hospital encounter of 02/15/24 (from the past 24 hour(s))   CBC   Result Value Ref Range    WBC 5.4 4.4 - 11.3 x10*3/uL    nRBC 7.7 (H) 0.0 - 0.0 /100 WBCs    RBC 2.94 (L) 4.00 - 5.20 x10*6/uL    Hemoglobin 8.5 (L) 12.0 - 16.0 g/dL    Hematocrit 25.0 (L) 36.0 - 46.0 %    MCV 85 80 - 100 fL    MCH 28.9 26.0 - 34.0 pg    MCHC 34.0 32.0 - 36.0 g/dL    RDW 20.8 (H) 11.5 - 14.5 %    Platelets 51 (L) 150 - 450 x10*3/uL   POCT GLUCOSE   Result Value Ref Range    POCT Glucose 160 (H) 74 - 99 mg/dL   POCT GLUCOSE   Result Value Ref Range    POCT Glucose 124 (H) 74 - 99 mg/dL   Blood Gas Arterial Full Panel   Result Value Ref Range    POCT pH, Arterial 7.28 (L) 7.38 - 7.42 pH    POCT pCO2, Arterial 59 (H) 38 - 42 mm Hg    POCT pO2, Arterial 413 (H) 85 - 95 mm Hg    POCT SO2, Arterial 100 94 - 100 %    POCT Oxy  Hemoglobin, Arterial 94.4 94.0 - 98.0 %    POCT Hematocrit Calculated, Arterial 26.0 (L) 36.0 - 46.0 %    POCT Sodium, Arterial 131 (L) 136 - 145 mmol/L    POCT Potassium, Arterial 4.7 3.5 - 5.3 mmol/L    POCT Chloride, Arterial 103 98 - 107 mmol/L    POCT Ionized Calcium, Arterial 1.10 1.10 - 1.33 mmol/L    POCT Glucose, Arterial 148 (H) 74 - 99 mg/dL    POCT Lactate, Arterial 0.6 0.4 - 2.0 mmol/L    POCT Base Excess, Arterial 0.4 -2.0 - 3.0 mmol/L    POCT HCO3 Calculated, Arterial 27.7 (H) 22.0 - 26.0 mmol/L    POCT Hemoglobin, Arterial 8.8 (L) 12.0 - 16.0 g/dL    POCT Anion Gap, Arterial 5 (L) 10 - 25 mmo/L    Patient Temperature 37.0 degrees Celsius    FiO2 40 %   Reticulocytes   Result Value Ref Range    Retic % 3.5 (H) 0.5 - 2.0 %    Retic Absolute 0.102 (H) 0.018 - 0.083 x10*6/uL    Reticulocyte Hemoglobin 32 28 - 38 pg    Immature Retic fraction 40.4 (H) <=16.0 %   Haptoglobin   Result Value Ref Range    Haptoglobin <10 (L) 37 - 246 mg/dL   CBC and Auto Differential   Result Value Ref Range    WBC 5.5 4.4 - 11.3 x10*3/uL    nRBC 8.6 (H) 0.0 - 0.0 /100 WBCs    RBC 2.91 (L) 4.00 - 5.20 x10*6/uL    Hemoglobin 8.2 (L) 12.0 - 16.0 g/dL    Hematocrit 25.5 (L) 36.0 - 46.0 %    MCV 88 80 - 100 fL    MCH 28.2 26.0 - 34.0 pg    MCHC 32.2 32.0 - 36.0 g/dL    RDW 21.8 (H) 11.5 - 14.5 %    Platelets 23 (LL) 150 - 450 x10*3/uL    Immature Granulocytes %, Automated 6.6 (H) 0.0 - 0.9 %    Immature Granulocytes Absolute, Automated 0.36 0.00 - 0.70 x10*3/uL   ECMO Venous Blood Gas   Result Value Ref Range    POCT pH, Venous ECMO 7.23 Reference range not established pH    POCT pCO2, Venous ECMO 71 Reference range not established mm Hg    POCT pO2,  Venous  ECMO 48 Reference range not established mm Hg    POCT SO2, Venous ECMO 84 Reference range not established %    POCT Oxy Hemoglobin, Venous ECMO 79.3 Reference range not established %    POCT Base Excess, Venous ECMO 0.2 Reference range not established mmol/L    POCT HCO3  Calculated, Venous ECMO 29.7 Reference range not established mmol/L    Patient Temperature 37.0 degrees Celsius    FiO2 40 %   Lactate Dehydrogenase   Result Value Ref Range    LDH 3,038 (H) 84 - 246 U/L   Calcium, Ionized   Result Value Ref Range    POCT Calcium, Ionized 1.08 (L) 1.1 - 1.33 mmol/L   Hepatic function panel   Result Value Ref Range    Albumin 3.6 3.4 - 5.0 g/dL    Bilirubin, Total 3.9 (H) 0.0 - 1.2 mg/dL    Bilirubin, Direct 1.3 (H) 0.0 - 0.3 mg/dL    Alkaline Phosphatase 165 (H) 33 - 110 U/L    ALT 34 7 - 45 U/L     (H) 9 - 39 U/L    Total Protein 5.7 (L) 6.4 - 8.2 g/dL   Coagulation Screen   Result Value Ref Range    Protime 13.1 (H) 9.8 - 12.8 seconds    INR 1.2 (H) 0.9 - 1.1    aPTT 52 (H) 27 - 38 seconds   Magnesium   Result Value Ref Range    Magnesium 2.52 (H) 1.60 - 2.40 mg/dL   Manual Differential   Result Value Ref Range    Neutrophils %, Manual 89.3 40.0 - 80.0 %    Lymphocytes %, Manual 4.4 13.0 - 44.0 %    Monocytes %, Manual 4.5 2.0 - 10.0 %    Eosinophils %, Manual 1.8 0.0 - 6.0 %    Basophils %, Manual 0.0 0.0 - 2.0 %    Seg Neutrophils Absolute, Manual 4.91 1.20 - 7.00 x10*3/uL    Lymphocytes Absolute, Manual 0.24 (L) 1.20 - 4.80 x10*3/uL    Monocytes Absolute, Manual 0.25 0.10 - 1.00 x10*3/uL    Eosinophils Absolute, Manual 0.10 0.00 - 0.70 x10*3/uL    Basophils Absolute, Manual 0.00 0.00 - 0.10 x10*3/uL    Total Cells Counted 112     RBC Morphology See Below     RBC Fragments Few     Alex Cells Few     Acanthocytes Few    Blood Gas Arterial Full Panel   Result Value Ref Range    POCT pH, Arterial 7.43 (H) 7.38 - 7.42 pH    POCT pCO2, Arterial 35 (L) 38 - 42 mm Hg    POCT pO2, Arterial 422 (H) 85 - 95 mm Hg    POCT SO2, Arterial 100 94 - 100 %    POCT Oxy Hemoglobin, Arterial 94.8 94.0 - 98.0 %    POCT Hematocrit Calculated, Arterial 27.0 (L) 36.0 - 46.0 %    POCT Sodium, Arterial 132 (L) 136 - 145 mmol/L    POCT Potassium, Arterial 4.2 3.5 - 5.3 mmol/L    POCT Chloride,  Arterial 107 98 - 107 mmol/L    POCT Ionized Calcium, Arterial 1.00 (L) 1.10 - 1.33 mmol/L    POCT Glucose, Arterial 132 (H) 74 - 99 mg/dL    POCT Lactate, Arterial 0.7 0.4 - 2.0 mmol/L    POCT Base Excess, Arterial -0.9 -2.0 - 3.0 mmol/L    POCT HCO3 Calculated, Arterial 23.2 22.0 - 26.0 mmol/L    POCT Hemoglobin, Arterial 9.0 (L) 12.0 - 16.0 g/dL    POCT Anion Gap, Arterial 6 (L) 10 - 25 mmo/L    Patient Temperature 37.0 degrees Celsius    FiO2 40 %   Blood Gas Arterial Full Panel   Result Value Ref Range    POCT pH, Arterial 7.66 (HH) 7.38 - 7.42 pH    POCT pCO2, Arterial 21 (L) 38 - 42 mm Hg    POCT pO2, Arterial 252 (H) 85 - 95 mm Hg    POCT SO2, Arterial 100 94 - 100 %    POCT Oxy Hemoglobin, Arterial 94.2 94.0 - 98.0 %    POCT Hematocrit Calculated, Arterial 29.0 (L) 36.0 - 46.0 %    POCT Sodium, Arterial 132 (L) 136 - 145 mmol/L    POCT Potassium, Arterial 4.4 3.5 - 5.3 mmol/L    POCT Chloride, Arterial 102 98 - 107 mmol/L    POCT Ionized Calcium, Arterial 1.09 (L) 1.10 - 1.33 mmol/L    POCT Glucose, Arterial 111 (H) 74 - 99 mg/dL    POCT Lactate, Arterial 1.0 0.4 - 2.0 mmol/L    POCT Base Excess, Arterial 3.8 (H) -2.0 - 3.0 mmol/L    POCT HCO3 Calculated, Arterial 23.7 22.0 - 26.0 mmol/L    POCT Hemoglobin, Arterial 9.5 (L) 12.0 - 16.0 g/dL    POCT Anion Gap, Arterial 11 10 - 25 mmo/L    Patient Temperature 37.0 degrees Celsius    FiO2 40 %   Renal Function Panel   Result Value Ref Range    Glucose 115 (H) 74 - 99 mg/dL    Sodium 135 (L) 136 - 145 mmol/L    Potassium 4.4 3.5 - 5.3 mmol/L    Chloride 101 98 - 107 mmol/L    Bicarbonate 23 21 - 32 mmol/L    Anion Gap 15 10 - 20 mmol/L    Urea Nitrogen 14 6 - 23 mg/dL    Creatinine 0.92 0.50 - 1.05 mg/dL    eGFR 84 >60 mL/min/1.73m*2    Calcium 8.5 (L) 8.6 - 10.6 mg/dL    Phosphorus 1.0 (L) 2.5 - 4.9 mg/dL    Albumin 3.7 3.4 - 5.0 g/dL   Tacrolimus level   Result Value Ref Range    Tacrolimus  5.0 <=15.0 ng/mL   Blood Gas Arterial Full Panel   Result Value Ref  Range    POCT pH, Arterial 7.67 (HH) 7.38 - 7.42 pH    POCT pCO2, Arterial 20 (L) 38 - 42 mm Hg    POCT pO2, Arterial 212 (H) 85 - 95 mm Hg    POCT SO2, Arterial 100 94 - 100 %    POCT Oxy Hemoglobin, Arterial 94.2 94.0 - 98.0 %    POCT Hematocrit Calculated, Arterial 27.0 (L) 36.0 - 46.0 %    POCT Sodium, Arterial 134 (L) 136 - 145 mmol/L    POCT Potassium, Arterial 4.2 3.5 - 5.3 mmol/L    POCT Chloride, Arterial 105 98 - 107 mmol/L    POCT Ionized Calcium, Arterial 1.11 1.10 - 1.33 mmol/L    POCT Glucose, Arterial 133 (H) 74 - 99 mg/dL    POCT Lactate, Arterial 1.0 0.4 - 2.0 mmol/L    POCT Base Excess, Arterial 3.4 (H) -2.0 - 3.0 mmol/L    POCT HCO3 Calculated, Arterial 23.1 22.0 - 26.0 mmol/L    POCT Hemoglobin, Arterial 9.0 (L) 12.0 - 16.0 g/dL    POCT Anion Gap, Arterial 10 10 - 25 mmo/L    Patient Temperature 37.0 degrees Celsius    FiO2 100 %   POCT GLUCOSE   Result Value Ref Range    POCT Glucose 140 (H) 74 - 99 mg/dL   Prepare Platelets: 1 Units, Leukocytes Reduced (CMV reduced risk)   Result Value Ref Range    PRODUCT CODE D0437D93     Unit Number [id number]     Unit ABO AB     Unit RH POS     Dispense Status TR     Blood Expiration Date March 31, 2024 23:59 EDT     PRODUCT BLOOD TYPE 8400     UNIT VOLUME 266    Type And Screen   Result Value Ref Range    ABO TYPE O     Rh TYPE POS     ANTIBODY SCREEN NEG    Blood Gas Arterial Full Panel   Result Value Ref Range    POCT pH, Arterial 7.58 (H) 7.38 - 7.42 pH    POCT pCO2, Arterial 24 (L) 38 - 42 mm Hg    POCT pO2, Arterial 168 (H) 85 - 95 mm Hg    POCT SO2, Arterial 100 94 - 100 %    POCT Oxy Hemoglobin, Arterial 94.0 94.0 - 98.0 %    POCT Hematocrit Calculated, Arterial 27.0 (L) 36.0 - 46.0 %    POCT Sodium, Arterial 132 (L) 136 - 145 mmol/L    POCT Potassium, Arterial 3.9 3.5 - 5.3 mmol/L    POCT Chloride, Arterial 105 98 - 107 mmol/L    POCT Ionized Calcium, Arterial 1.07 (L) 1.10 - 1.33 mmol/L    POCT Glucose, Arterial 125 (H) 74 - 99 mg/dL    POCT  Lactate, Arterial 0.7 0.4 - 2.0 mmol/L    POCT Base Excess, Arterial 1.2 -2.0 - 3.0 mmol/L    POCT HCO3 Calculated, Arterial 22.5 22.0 - 26.0 mmol/L    POCT Hemoglobin, Arterial 9.0 (L) 12.0 - 16.0 g/dL    POCT Anion Gap, Arterial 8 (L) 10 - 25 mmo/L    Patient Temperature 37.0 degrees Celsius    FiO2 30 %   Blood Gas Arterial Full Panel   Result Value Ref Range    POCT pH, Arterial 7.54 (H) 7.38 - 7.42 pH    POCT pCO2, Arterial 24 (L) 38 - 42 mm Hg    POCT pO2, Arterial 146 (H) 85 - 95 mm Hg    POCT SO2, Arterial 100 94 - 100 %    POCT Oxy Hemoglobin, Arterial 94.4 94.0 - 98.0 %    POCT Hematocrit Calculated, Arterial 23.0 (L) 36.0 - 46.0 %    POCT Sodium, Arterial 135 (L) 136 - 145 mmol/L    POCT Potassium, Arterial 3.7 3.5 - 5.3 mmol/L    POCT Chloride, Arterial 109 (H) 98 - 107 mmol/L    POCT Ionized Calcium, Arterial 1.08 (L) 1.10 - 1.33 mmol/L    POCT Glucose, Arterial 122 (H) 74 - 99 mg/dL    POCT Lactate, Arterial 0.3 (L) 0.4 - 2.0 mmol/L    POCT Base Excess, Arterial -1.5 -2.0 - 3.0 mmol/L    POCT HCO3 Calculated, Arterial 20.5 (L) 22.0 - 26.0 mmol/L    POCT Hemoglobin, Arterial 7.8 (L) 12.0 - 16.0 g/dL    POCT Anion Gap, Arterial 9 (L) 10 - 25 mmo/L    Patient Temperature 37.0 degrees Celsius    FiO2 30 %   ECMO ARTERIAL FULL PANEL   Result Value Ref Range    POCT pH, Arterial ECMO 7.29 Reference range not established pH    POCT pCO2, Arterial ECMO 57 Reference range not established mm Hg    POCT pO2,  Arterial ECMO 455 Reference range not established mm Hg    POCT SO2, Arterial ECMO 100 Reference range not established %    POCT Oxy Hemoglobin, Arterial ECMO 94.3 Reference range not established %    POCT Hematocrit Calculated, Arterial ECMO 26.0 (L) 36.0 - 46.0 %    POCT Sodium, Arterial  ECMO 134 (L) 136 - 145 mmol/L    POCT Potassium, Arterial  ECMO 4.6 3.5 - 5.3 mmol/L    POCT Chloride, Arterial  ECMO 102 98 - 107 mmol/L    POCT Ionized Calcium, Arterial  ECMO 1.15 1.10 - 1.33 mmol/L    POCT Glucose,  Arterial  ECMO 161 (H) 74 - 99 mg/dL    POCT Lactate Arterial  ECMO 0.4 0.4 - 2.0 mmol/L    POCT Base Excess, Arterial ECMO 0.3 Reference range not established mmol/L    POCT HCO3 Calculated, Arterial ECMO 27.4 Reference range not established mmol/L    POCT Hemoglobin, Arterial  ECMO 8.8 (L) 12.0 - 16.0 g/dL    POCT Anion Gap, Arterial  ECMO 9 Reference range not established mmo/L    Patient Temperature 37.0 degrees Celsius    FiO2 100 %   ECMO VENOUS FULL PANEL   Result Value Ref Range    POCT pH, Venous ECMO 7.30 Reference range not established pH    POCT pCO2, Venous ECMO 58 Reference range not established mm Hg    POCT pO2,  Venous  ECMO 52 Reference range not established mm Hg    POCT SO2, Venous ECMO 88 Reference range not established %    POCT Oxy Hemoglobin, Venous ECMO 81.8 Reference range not established %    POCT Hematocrit Calculated, Venous ECMO 26.0 (L) 36.0 - 46.0 %    POCT Sodium, Venous ECMO 134 (L) 136 - 145 mmol/L    POCT Potassium, Venous ECMO 4.7 3.5 - 5.3 mmol/L    POCT Chloride, Venous ECMO 102 98 - 107 mmol/L    POCT Ionized Calcium, Venous ECMO 1.16 1.10 - 1.33 mmol/L    POCT Glucose, Venous ECMO 159 (H) 74 - 99 mg/dL    POCT Lactate Venous ECMO 0.4 0.4 - 2.0 mmol/L    POCT Base Excess, Venous ECMO 1.5 Reference range not established mmol/L    POCT HCO3 Calculated, Venous ECMO 28.5 Reference range not established mmol/L    POCT Hemoglobin, Venous ECMO 8.6 (L) 12.0 - 16.0 g/dL    POCT Anion Gap, Venous ECMO 8 Reference range not established mmo/L    Patient Temperature 37.0 degrees Celsius    FiO2 100 %   Calcium, Ionized   Result Value Ref Range    POCT Calcium, Ionized 1.13 1.1 - 1.33 mmol/L   CBC   Result Value Ref Range    WBC 5.1 4.4 - 11.3 x10*3/uL    nRBC 9.3 (H) 0.0 - 0.0 /100 WBCs    RBC 2.92 (L) 4.00 - 5.20 x10*6/uL    Hemoglobin 8.3 (L) 12.0 - 16.0 g/dL    Hematocrit 25.3 (L) 36.0 - 46.0 %    MCV 87 80 - 100 fL    MCH 28.4 26.0 - 34.0 pg    MCHC 32.8 32.0 - 36.0 g/dL    RDW 22.5  (H) 11.5 - 14.5 %    Platelets 85 (L) 150 - 450 x10*3/uL   Hepatic function panel   Result Value Ref Range    Albumin 3.6 3.4 - 5.0 g/dL    Bilirubin, Total 4.2 (H) 0.0 - 1.2 mg/dL    Bilirubin, Direct 2.1 (H) 0.0 - 0.3 mg/dL    Alkaline Phosphatase 201 (H) 33 - 110 U/L    ALT 29 7 - 45 U/L     (H) 9 - 39 U/L    Total Protein 5.5 (L) 6.4 - 8.2 g/dL   Coagulation Screen   Result Value Ref Range    Protime 12.9 (H) 9.8 - 12.8 seconds    INR 1.1 0.9 - 1.1    aPTT 35 27 - 38 seconds   Magnesium   Result Value Ref Range    Magnesium 2.49 (H) 1.60 - 2.40 mg/dL   Heparin Assay, UFH   Result Value Ref Range    Heparin Unfractionated 0.2 See Comment Below for Therapeutic Ranges IU/mL   Blood Gas Arterial Full Panel   Result Value Ref Range    POCT pH, Arterial 7.40 7.38 - 7.42 pH    POCT pCO2, Arterial 40 38 - 42 mm Hg    POCT pO2, Arterial 123 (H) 85 - 95 mm Hg    POCT SO2, Arterial 100 94 - 100 %    POCT Oxy Hemoglobin, Arterial 93.6 (L) 94.0 - 98.0 %    POCT Hematocrit Calculated, Arterial 26.0 (L) 36.0 - 46.0 %    POCT Sodium, Arterial 133 (L) 136 - 145 mmol/L    POCT Potassium, Arterial 4.5 3.5 - 5.3 mmol/L    POCT Chloride, Arterial 103 98 - 107 mmol/L    POCT Ionized Calcium, Arterial 1.14 1.10 - 1.33 mmol/L    POCT Glucose, Arterial 158 (H) 74 - 99 mg/dL    POCT Lactate, Arterial 0.5 0.4 - 2.0 mmol/L    POCT Base Excess, Arterial 0.0 -2.0 - 3.0 mmol/L    POCT HCO3 Calculated, Arterial 24.8 22.0 - 26.0 mmol/L    POCT Hemoglobin, Arterial 8.8 (L) 12.0 - 16.0 g/dL    POCT Anion Gap, Arterial 10 10 - 25 mmo/L    Patient Temperature 37.0 degrees Celsius    FiO2 30 %   Basic Metabolic Panel   Result Value Ref Range    Glucose 170 (H) 74 - 99 mg/dL    Sodium 136 136 - 145 mmol/L    Potassium 4.4 3.5 - 5.3 mmol/L    Chloride 101 98 - 107 mmol/L    Bicarbonate 27 21 - 32 mmol/L    Anion Gap 12 10 - 20 mmol/L    Urea Nitrogen 14 6 - 23 mg/dL    Creatinine 0.85 0.50 - 1.05 mg/dL    eGFR >90 >60 mL/min/1.73m*2    Calcium  8.4 (L) 8.6 - 10.6 mg/dL   Phosphorus   Result Value Ref Range    Phosphorus 2.2 (L) 2.5 - 4.9 mg/dL   POCT GLUCOSE   Result Value Ref Range    POCT Glucose 161 (H) 74 - 99 mg/dL   ACTIVATED CLOTTING TIME LOW   Result Value Ref Range    POCT Activated Clotting Time Low Range 205 (H) 83 - 199 sec     *Note: Due to a large number of results and/or encounters for the requested time period, some results have not been displayed. A complete set of results can be found in Results Review.       XR chest 1 view    Result Date: 3/28/2024  Interpreted By:  Sandra Griffith and Stephens Katherine STUDY: XR CHEST 1 VIEW;  3/27/2024 8:38 pm   INDICATION: Signs/Symptoms:Post op cardiac surgery.   COMPARISON: Chest radiograph 03/27/2024   ACCESSION NUMBER(S): [id number]   ORDERING CLINICIAN: YESENIA KHAN   FINDINGS: AP radiograph of the chest was provided.   Endotracheal tube noted with tip projecting approximately 2.0 cm in the right mainstem bronchus. Enteric tube seen coursing below the level diaphragm with tip out of the field of view. Right subclavian Impella device overlying the cardiomediastinal silhouette. Right IJ central venous catheter with tip overlying the mid SVC. ECMO cannula with tip overlying the expected location of the right atrium. Surgical clips overlie the cardiomediastinal silhouette and right axilla. Cardiac device projects over the cardiomediastinal silhouette. Postsurgical changes from median sternotomy.   CARDIOMEDIASTINAL SILHOUETTE: The cardiomediastinal silhouette is obscured. The mediastinum is shifted to the left likely secondary atelectasis.   LUNGS: Hazy opacification of the left hemithorax. Right basilar atelectasis. No pneumothorax.   ABDOMEN: No remarkable upper abdominal findings.   BONES: No acute osseous changes.       1.  Endotracheal tube with tip in the right mainstem bronchus, retraction of at least 2 cm is recommended. 2. Hazy opacification of the left hemithorax with leftward  mediastinal shift, likely secondary atelectasis. 3. Postsurgical changes and medical devices as described above.   I personally reviewed the images/study and I agree with Charity Ross DO's (radiology resident) findings as stated. This study was interpreted at [facility], [place], [place].   MACRO: Charity Ross discussed the significance and urgency of this critical finding by telephone with  YESENIA KHAN on 3/27/2024 at 9:37 pm.  (**-RCF-**) Findings:  See findings.     Signed by: Sandra Griffith 3/28/2024 9:19 AM Dictation workstation:   [id number]    Vascular US Lower Extremity Arterial Duplex Bilateral    Result Date: 3/28/2024  Preliminary Cardiology Report           [place] [address]   Tel [phone] and Fax [phone]                 Preliminary Vascular Lab Report  VASC US LOWER EXTREMITY ARTERIAL DUPLEX GRAFT BILATERAL W/ KATHIE  Patient Name:     MIGUEL DIMAS      Reading           [id number] [address]                Physician:        MD Study Date:       3/27/2024            Ordering          [id number] FIONA WHYTE                                        Physician: MRN/PID:          [id number]             Technologist:     Evangelina Bailey RVT Accession#:       [id number]         Technologist 2:   Joy Lewis RVT Date of           1990             Encounter#:       [id number] Birth/Age: Gender:           F Admission Status: Inpatient            Location          [facility]                                        Performed:  Diagnosis/ICD: Acute Kidney Failure-N17.9; Encounter for other preprocedural                examination-Z01.818 Indication:    pre ECMO placement Procedure/CPT: 79663 Peripheral artery Lower arterial Duplex BPG complete  **Report Amended** Report Amended By: Evangelina Bailey RVT     Date and Time: 3/28/2024 at 3/28/2024  **CRITICAL RESULT** Critical  Result: Left SFA prox occlusion with reconstitution via collateralized flow noted distally. Notification called to Pretty Toussaint MD on 3/27/2024 at 3:20:25 PM by Evangelina Bailey RVT.  PRELIMINARY CONCLUSIONS: Right Lower Arterial: Evaluated right EIA distal, CFA, SFA prox, and Profunda appear patent. Left Lower Arterial: There appears to be a short segment occlusion noted in the proximal SFA just distal to the CFA bifurcation. There is collateralized flow noted distally. There are elevated velocities noted in the left CFA, may be overestimated due to shadowing from previous procedures. Right Lower Venous: The evaluated right EIV distal, CFV, FV prox, and DFV appear patent with no evidence of acute deep vein thrombus. Left Lower Venous: Cannot rule out thrombus in non-compressible iliac and common femoral vein veins. Patient unable to tolerate compressions. Color flow and Doppler noted. No ultrasonic evidence of acute deep vein thrombus in the evaluated left EIV distal, CFV, FV prox, and DFV.  Additional Findings: Irregular heart rhythm noted due to cardiac device. Left groin was technically difficult due to previous procedures and scarring.  Imaging & Doppler Findings:  Right                 Compressible Thrombus   Flow Distal External Iliac     Yes        None   Pulsatile CFV                       Yes        None   Pulsatile PFV                       Yes        None   Pulsatile FV Proximal               Yes        None   Pulsatile  Left                  Compress Thrombus   Flow Distal External Iliac                   Pulsatile CFV                                     Pulsatile PFV                                     Pulsatile FV Proximal             Yes      None   Pulsatile  Right                     Left   PSV                       PSV 56 cm/s        EIA        30 cm/s 29 cm/s        CFA        72 cm/s 36 cm/s Profunda Proximal 70 cm/s 40 cm/s   SFA Proximal    28 cm/s  VASCULAR PRELIMINARY REPORT completed  by Evangelina Bailey RVT on 3/28/2024 at 7:32:14 AM  ** Final (Updated) **     XR abdomen 1 view    Result Date: 3/27/2024  Interpreted By:  Sandra Griffith and Sheng Max STUDY: XR ABDOMEN 1 VIEW; XR CHEST 1 VIEW;  3/26/2024 7:12 pm; 3/27/2024 7:44 am   INDICATION: Signs/Symptoms:S/p OHT r/o ileas; Signs/Symptoms:Impella.   COMPARISON: Chest radiographs dated 03/26/2024, 03/25/2024, 03/22/2024 and CT chest abdomen pelvis dated 03/17/2024   ACCESSION NUMBER(S): [id number]; [id number]   ORDERING CLINICIAN: FILOMENA KEANE   FINDINGS: AP radiographs of the chest and abdomen were provided:   LINES AND DEVICES: Right subclavian approach Impella device projects over the left ventricle. Right internal jugular approach central venous catheter tip projects over the lower SVC. CardioMEMS device projects over the cardiomediastinal silhouette. Numerous cardiac pacing wires are present. Surgical clips project over the cardiomediastinal silhouette and right axilla. Numerous intact median sternotomy wires.   CARDIOMEDIASTINAL SILHOUETTE: Stable enlargement of the cardiomediastinal silhouette is stable in size and configuration.   LUNGS: There is no pulmonary edema. Linear subsegmental bibasilar atelectasis. Trace bilateral pleural effusions. No focal consolidation or pneumothorax is seen.   ABDOMEN: Paucity of gas in the imaged bowel loops with nonobstructive bowel gas pattern. Limited evaluation of pneumoperitoneum on supine imaging, however no gross evidence of free air is noted.   BONES: No acute osseous abnormality.       1. Stable enlargement of the cardiomediastinal silhouette with trace bilateral pleural effusions. 2. Paucity of gas in the imaged bowel loops with nonobstructive bowel gas pattern.     I personally reviewed the images/study and I agree with the findings as stated by Dr. Tee Joe. This study was interpreted at [place], [place].   MACRO: none    Signed by: Sandra Griffith 3/27/2024 2:51 PM Dictation workstation:   [id number]    XR chest 1 view    Result Date: 3/27/2024  Interpreted By:  Sandra Griffith and Sheng Max STUDY: XR ABDOMEN 1 VIEW; XR CHEST 1 VIEW;  3/26/2024 7:12 pm; 3/27/2024 7:44 am   INDICATION: Signs/Symptoms:S/p OHT r/o ileas; Signs/Symptoms:Impella.   COMPARISON: Chest radiographs dated 03/26/2024, 03/25/2024, 03/22/2024 and CT chest abdomen pelvis dated 03/17/2024   ACCESSION NUMBER(S): [id number]; [id number]   ORDERING CLINICIAN: FILOMENA KEANE   FINDINGS: AP radiographs of the chest and abdomen were provided:   LINES AND DEVICES: Right subclavian approach Impella device projects over the left ventricle. Right internal jugular approach central venous catheter tip projects over the lower SVC. CardioMEMS device projects over the cardiomediastinal silhouette. Numerous cardiac pacing wires are present. Surgical clips project over the cardiomediastinal silhouette and right axilla. Numerous intact median sternotomy wires.   CARDIOMEDIASTINAL SILHOUETTE: Stable enlargement of the cardiomediastinal silhouette is stable in size and configuration.   LUNGS: There is no pulmonary edema. Linear subsegmental bibasilar atelectasis. Trace bilateral pleural effusions. No focal consolidation or pneumothorax is seen.   ABDOMEN: Paucity of gas in the imaged bowel loops with nonobstructive bowel gas pattern. Limited evaluation of pneumoperitoneum on supine imaging, however no gross evidence of free air is noted.   BONES: No acute osseous abnormality.       1. Stable enlargement of the cardiomediastinal silhouette with trace bilateral pleural effusions. 2. Paucity of gas in the imaged bowel loops with nonobstructive bowel gas pattern.     I personally reviewed the images/study and I agree with the findings as stated by Dr. Tee Joe. This study was interpreted at [place], [place].   MACRO: none    Signed by: Sandra Griffith 3/27/2024 2:51 PM Dictation workstation:   [id number]    Transthoracic Echo (TTE) Complete    Result Date: 3/27/2024   [facility], [address] [place] [address]                Tel [phone] and Fax [phone] TRANSTHORACIC ECHOCARDIOGRAM REPORT  Patient Name:      MIGUEL DIMAS      Reading Physician:    [id number] Patrica BASS MD Study Date:        3/27/2024            Ordering Provider:    [id number] KONSTANTIN PERKINS MRN/PID:           [id number]             Fellow: Accession#:        [id number]         Nurse: Date of Birth/Age: 1990 / 34 years  Sonographer:          MAURO Colbert RDCS Gender:            F                    Additional Staff: Height:            152.40 cm            Admit Date:           2/15/2024 Weight:            95.71 kg             Admission Status:     Inpatient - STAT BSA / BMI:         1.91 m2 / 41.21      Encounter#:           [id number]                    kg/m2                                         Department Location:  [address] Blood Pressure: 93 /57 mmHg Study Type:    TRANSTHORACIC ECHO (TTE) COMPLETE Diagnosis/ICD: 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 Definity used as a contrast agent for endocardial border               definition. Total contrast used for this procedure was 3 mL via IV               push. The patient was awake.  PHYSICIAN INTERPRETATION: Left Ventricle: The left ventricular systolic function is severely decreased, with an estimated ejection fraction of 25%. There is global hypokinesis of the left ventricle with minor regional variations. The left ventricular cavity size is  normal. Left ventricular diastolic filling was indeterminate. There is no definite left ventricular thrombus visualized. Echocontrast was used and excluded LV apical thrombus. Left Atrium: The left atrium is consistent with transplant. Right Ventricle: The right ventricle is mildly enlarged. There is reduced right ventricular systolic function. Right Atrium: The right atrium is mildly dilated. Aortic Valve: The aortic valve was not well visualized. There is indeterminate aortic valve regurgitation. Mitral Valve: The mitral valve is mildly thickened. There is moderate to severe mitral valve regurgitation which is centrally directed. Tricuspid Valve: The tricuspid valve is structurally normal. There is severe tricuspid regurgitation. The Doppler estimated RVSP is within normal limits at 28.8 mmHg. RSVP likely underestimated due to the severity of TR. Pulmonic Valve: The pulmonic valve is not well visualized. Pulmonic valve regurgitation was not assessed. Pericardium: There is a trivial pericardial effusion. Pleural: There is left pleural effusion. Aorta: The aortic root is normal. In comparison to the previous echocardiogram(s): Compared with the prior exam from 3/25/2024, there is still severe global LV systolic dysfunction while on full Impella suppoert. There was already moderate to severe MR which persists. TR was not assessed on the prior study but is severe TR today. Although the RVSP is estimated to be normal based on TR Vmax, that is likely underestimated due to the severity of TR. There was previously severely reduced RV function previously and appears imilar today.  LEFT VENTRICULAR ASSIST DEVICE: LVAD: The patient has a(n) Impella LVAD device present. LVAD Comments: IMpella extends approx 3.8-4.0 cm beneath the AV and is angles posteriorly.  CONCLUSIONS:  1. Left ventricular systolic function is severely decreased with a 25% estimated ejection fraction.  2. Echocontrast was used and excluded LV apical  thrombus.  3. No left ventricular thrombus visualized.  4. There is reduced right ventricular systolic function.  5. Moderate to severe mitral valve regurgitation.  6. RVSP within normal limits.  7. Severe tricuspid regurgitation visualized.  8. Compared with the prior exam from 3/25/2024, there is still severe global LV systolic dysfunction while on full Impella suppoert. There was already moderate to severe MR which persists. TR was not assessed on the prior study but is severe TR today. Although the RVSP is estimated to be normal based on TR Vmax, that is likely underestimated due to the severity of TR. There was previously severely reduced RV function previously and appears imilar today.  9. There is global hypokinesis of the left ventricle with minor regional variations. QUANTITATIVE DATA SUMMARY: 2D MEASUREMENTS:                          Normal Ranges: IVSd:          0.90 cm   (0.6-1.1cm) LVPWd:         0.90 cm   (0.6-1.1cm) LVIDd:         4.20 cm   (3.9-5.9cm) LVIDs:         3.30 cm LV Mass Index: 62.1 g/m2 LV % FS        21.4 % AORTA MEASUREMENTS:                      Normal Ranges: Ao Sinus, d: 2.00 cm (2.1-3.5cm) LV SYSTOLIC FUNCTION BY 2D PLANIMETRY (MOD):                     Normal Ranges: EF-A4C View: 25.6 % (>=55%) EF-A2C View: 26.2 % EF-Biplane:  35.2 %  RIGHT VENTRICLE: RV Basal 3.50 cm RV Mid   2.40 cm RV Major 7.0 cm TAPSE:   6.5 mm TRICUSPID VALVE/RVSP:                             Normal Ranges: Peak TR Velocity: 2.54 m/s RV Syst Pressure: 28.8 mmHg (< 30mmHg)  [id number] Patrica Aguilera MD Electronically signed on 3/27/2024 at 1:20:02 PM  ** Final **         This patient has a urinary catheter   Reason for the urinary catheter remaining today? Urine catheter unnecessary, will be removed today    This patient is intubated   Reason for patient to remain intubated today? they are planned for extubation trial later today/tonight       Malnutrition Diagnosis Status: Ongoing  Malnutrition Diagnosis: Moderate  malnutrition related to acute disease or injury  As Evidenced by: pt's reported intake likely meeting <75% of estimated needs for at least 7 days, previous 5% weight loss in 1 month, LOS 42.  I agree with the dietitian's malnutrition diagnosis.      Assessment/Plan   Ms. Yimi Bowles is a 34F with a PMHx sig for stage D HFrEF (PPCM) s/p ICD s/p CardioMEMs, hypothyroidism 2/2 thyroidectomy, obesity s/p gastric bypass (2017), and SLE who is s/p OHT (3/31/2022) with a post-OHT course complicated by RIJ/RUE DVTs, leukopenia, left groin seroma, and asymptomatic 2R ACR (11/2022) treated with steroids, s/p total hysterectomy (2023), Flu A (1/2024).     Originally presented to [facility] ED on 2/15/24 with complaints of N/V x 3 days and inability to keep medications down. Found to have acute systolic heart failure with primary graft failure and cardiogenic shock. Treated for suspected acute cellular vs. acute antibody mediated rejection; however, multiple cardiac biopsies negative for signs of rejection or other causes of graft failure. Course has been complicated by multiple MCS devices for refractory cardiogenic shock (right femoral IABP: 2/18/24 - 3/1/24, Left femoral VA ECMO: 2/19/24 - 2/29/24, Right axillary impella 5.5: 3/1/24 - remains in place, 2nd right femoral VA ECMO: 3/27/24 - remains in place), ARF requiring RRT, acute liver injury, intubation due to volume overload in setting of pulmonary edema, severe hemolysis 2/2 to impella malposition, mild CMV viremia, bilateral provoked IJ DVTs, multiple episodes of epistaxis & coagulopathies requiring ongoing blood product transfusions.       Transferred to CTICU on 3/27/24 following right femoral VA ECMO placement due to worsening cardiogenic shock and multi-organ failure despite Impella 5.5 support and multiple inotropes.       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  #Acute renal  failure requiring RRT   #Acute transaminitis  #Epistaxis   #Acute coagulopathy   #Low grade CMV Viremia  #Provoked bilateral IJ DVTs    #Thrombocytopenia   #Anemia     Donor/Recipient Infectious history:  CMV: -/+ (low grade CMV viremia w/ levels < 35 on 3/1/24, repeat CMV levels remain negative since 3/8/24)  Toxo: -/-   Hep C: -/-     Rejection/Prophylaxis (transplants):  - Steroids: Continue 10mg PO prednisone daily  - Tacrolimus: 0.5mg liquid @ 0630 and 1mg liquid @ 1830 w/ daily levels drawn @ 0600  - Tacrolimus goal troughs: 8-10  - Myfortic acid: HOLD 180mg BID    - Antifungals: nystatin 500,000units Q6  - Antivirals: valcyte 450mg Q48hrs   - Anti PCP & Toxoplasmosis: holding Bactrim SS daily due to thrombocytopenia     Last cardiac biopsy: 2/16/24 with ACR grade 1R and no AMR (extremely low C4d+ detected), 2/20/24 negative for ACR and AMR  Last HLA: 3/2/24 negative for DSAs   Last RHC: closing numbers on 3/16/24 /86(97) RA 20, PAP 40/33(37), C.O./C.I. 5.5/2.8, PVR 88, SVR 1115   Last LHC: 2/18/24 negative for CAV and CAD   Last TTE/BOB: 3/27/24 shows LVEF 25% w/ global hypokinesis, severe RV dysfunction, mod-severe MR, severe TR and impella angled posteriorly   Osteopenia/osteoporosis prophylaxis: Vitamin D3 and calcium supplements  Peptic/gastric ulcer prophylaxis: Pantoprazole 40mg BID -> can transition to daily in next 24-48hrs if no signs of UGIB   CAV Prophylaxis: Holding Aspirin 81mg due to continued thrombocytopenia & pravastatin due to transaminitis     - Unclear cause of acute severe graft dysfunction. Broad differential including SLE flair, giant cell vasculitis, CAV/CAD, viral cardiomyopathy, sarcoidosis, amyloidosis and allograft rejection amongst many others. Most likely smoldering ACR vs. AMR; however, 2xallograft biopsies on 2/16 & 2/20 remain negative for any significant pathology. Notably, both biopsies taken after rejection therapies implemented which may have reduced areas of graft  damage.    - DSAs remain negative; however, patient may have non-HLA antibodies present. 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   - Negative CAV & CAD via LHC on 2/18/24   - Completed methylpred steroid pulse w/ 1g Q24 x 3 days (2/16-2/18) and prednisone taper   - Thymoglobulin doses: 2/18 & 2/19   - IVIG + PLEX Session: 2/18, 2/20, 3/7, 3/11 and 3/13    - Right femoral VA ECMO flowing 3.5L w/ FIO2 100% and sweep 1.5  - Right axillary impella for LV venting remains in place and set P6 w/ flows of 1.9-2.3  - Epinephrine 0.04mcg/kg/min and dobutamine 5.0mcg/kg/min   - Decreased pulse pressure of ~5-15 on A-line with pulsatile flow.  - Lactate normalized    - Remains sedated with Precedex and intubated via ETT: Vent lung protective and set ACVC FIO2 50%, RR 10,  and PEEP 8   - LFTs improving s/p ECMO cannulation with stable hemolytic labwork   - Previously cardiorenal induced ARF resolved w/ renal recovery following ECMO cannulation. RRT stopped on 2/27/24 and patient had been making appropriate UOP. Post ECMO decannulation, again developed ARF requiring RRT. Her kidneys have had multiple injuries due to poor cardiac output and now with hemolysis induced injury. Will have been on RRT x 6 weeks as of 3/29/24, meeting criteria for kidney transplantation. Abdominal Transplant team will formally approve for kidney transplantation pending approval for heart transplantation.    - At this time, the patient has developed refractory heart failure and renal failure. Discussion for heart and kidney transplantation are ongoing. Concerns remain with deconditioning, multiorgan failure, lack of diagnosis causing graft failure, possible rejection despite appropriate immunotherapy, and optimal immunotherapy plan if re-transplanted.      Recommendations:  - Continue Tacro 1.5mg BID liquid BID (increased 2/2 subtherapeutic level & absence of oral only Myfortic currently as pt remains intubated). 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   - Agree with line holiday (CVVHD currently running through ECMO circuit), however ideally would like PA catheter for further optimization. Line placement difficult given L internal jugular thrmobosis, femoral ECMO cannula, and thrombocytopenia.    - Agree with platelet transfusion today  - Agree with CVVH via ECMO circuit   - Agree with SBT and potential extubation as appropriate  - Plan to discuss heart transplantation at thoracic transplant selection meeting on 4/2/24  - She was approved for kidney transplant at selection committee on 3/28/2024 pending heart transplant approval     Appreciate continued CTICU care/management.       Patient was examined and discussed with Advanced Heart Failure and Transplant Cardiology attending physician, Dr. Fabrice Price.    Signed,  Gwendolyn Del Valle MD  Heart Failure Fellow PGY4

## 2024-03-31 NOTE — PROGRESS NOTES
"Charla Bowles is a 34 y.o. female on day 45 of admission presenting with Cardiogenic shock (CMS/HCC), recently placed back on VA-ECMO after deteriorating on axillary 5.5 Impella.    Patient requires ECMO support. Drainage cannula site, cannula, pump, oxygenator, return cannula and return cannula site clinically inspected. RPM and sweep reviewed and adjusted appropriate to clinical context. Anticoagulation status and intensity discussed. Plan for weaning, next clinical steps discussed with team and family. Discussed with cardiothoracic surgeon, perfusion, palliative care and physical/occupational therapy    ECMO Indication: CS  ECMO Cannula Configuration: femfem  ECMO circuit run from drainage cannula to pump to oxygenator to return cannula: Y  Presence of cannula bleeding: N  Presence of oxygenator clot: N  Pre/post PaO2 adequate: Y  LV Unloading/Pulse Pressure: Impella  Distal Perfusion: stable  Anticoagulation Plan/Monitoring: heparin  Mobility Plan/Candidacy: N  Path to decannulation/anticipated decannulation date: unclear      dexmedeTOMIDine, 0.1-1.5 mcg/kg/hr, Last Rate: 0.5 mcg/kg/hr (03/31/24 1000)  [Held by provider] heparin, 0-4,000 Units/hr, Last Rate: Stopped (03/30/24 1435)  heparin Impella Purge 25 units/mL in 500 mL D5W, 10 mL/hr, Last Rate: 10 mL/hr (03/31/24 0600)  lactated Ringer's, 5 mL/hr, Last Rate: 5 mL/hr (03/31/24 1100)  PrismaSol 4/2.5, 25 mL/kg/hr      Vent Mode: Volume control/assist control  FiO2 (%):  [30 %-40 %] 30 %  S RR:  [10-16] 12  S VT:  [350 mL-400 mL] 350 mL  PEEP/CPAP (cm H2O):  [10 cm H20] 10 cm H20  NM SUP:  [10 cm H20] 10 cm H20  MAP (cm H2O):  [13-17] 13    Objective     Physical Exam    Last Recorded Vitals  Blood pressure 78/74, pulse 80, temperature 36.6 °C (97.9 °F), temperature source Temporal, resp. rate 12, height 1.549 m (5' 0.98\"), weight 96 kg (211 lb 10.3 oz), SpO2 100 %.  Intake/Output last 3 Shifts:  I/O last 3 completed shifts:  In: 3015.9 (31.4 mL/kg) " [I.V.:1235.9 (12.9 mL/kg); Blood:750; NG/GT:430; IV Piggyback:600]  Out: 7586 (79 mL/kg) [Other:7586]  Weight: 96 kg       Assessment/Plan   Principal Problem:    Cardiogenic shock (CMS/HCC)  Active Problems:    Heart transplant recipient (CMS/HCC)    ESRD (end stage renal disease) on dialysis (CMS/HCC)    HIRAM (acute kidney injury) (CMS/[facility])    Acute passive congestion of liver    Hyponatremia    Iron deficiency anemia    Abdominal pain    Systolic congestive heart failure (CMS/HCC)    Cardiac transplant rejection (CMS/HCC)    Acute combined systolic (congestive) and diastolic (congestive) heart failure (CMS/HCC)    I, as the attending critical care physician, have personally reviewed the recent patient history, physical exam, vital signs, significant labs, medications, imaging, and ECMO settings, oxygenator, and flows of this critically ill patient. Using this information I have and will continue to actively manage this critically ill patient's extracorporeal membrane oxygenation (ECMO)/extracorporeal life support (ECLS).        Quang Mehta MD

## 2024-03-31 NOTE — PROGRESS NOTES
CTICU Progress Note  Charla Bowles/[id number]    Admit Date: 2/15/2024  Hospital Length of Stay: 45   ICU Length of Stay: 3d 11h   CT SURGEON:     SUBJECTIVE:   No overt evidence of bleeding  LDH uptrending  Oxy started for more restlessness  Back on ACVC    MEDICATIONS  Infusions:  dexmedeTOMIDine, Last Rate: 0.6 mcg/kg/hr (03/31/24 0600)  [Held by provider] heparin, Last Rate: Stopped (03/30/24 1435)  heparin Impella Purge 25 units/mL in 500 mL D5W, Last Rate: 10 mL/hr (03/31/24 0600)  lactated Ringer's, Last Rate: 5 mL/hr (03/31/24 0600)  PrismaSol 4/2.5      Scheduled:  acetaminophen, 650 mg, q4h  bisacodyl, 10 mg, Daily  calcium carbonate-vitamin D3, 1 tablet, Daily  cephalexin, 500 mg, q12h TRICIA  cyanocobalamin, 500 mcg, Daily  darbepoetin floyd, 100 mcg, q14 days  ferrous sulfate (325 mg ferrous sulfate), 65 mg of iron, Daily with breakfast  folic acid, 1 mg, Daily  hydrALAZINE, 10 mg, q8h  insulin lispro, 0-15 Units, q4h  levothyroxine, 200 mcg, Daily  lidocaine, 1 patch, Daily  multivitamin with iron-minerals, 15 mL, Daily  multivitamin with minerals, 1 tablet, Daily  [Held by provider] mycophenolate, 180 mg, BID  nystatin, 5 mL, q6h  oxyCODONE, 10 mg, q6h  oxygen, , Continuous - Inhalation  pantoprazole, 40 mg, Daily  polyethylene glycol, 17 g, BID  potassium, sodium phosphates, 1 packet, q8h  predniSONE, 10 mg, Daily  sennosides-docusate sodium, 2 tablet, BID  [Held by provider] sulfamethoxazole-trimethoprim, 80 mg of trimethoprim, Daily  tacrolimus, 1.5 mg, q24h  tacrolimus, 1.5 mg, q24h  traZODone, 25 mg, Nightly  valGANciclovir, 450 mg, q48h      PRN:  calcium gluconate, 1 g, q6h PRN  calcium gluconate, 2 g, q6h PRN  dextrose, 25 g, q15 min PRN  dextrose, 25 g, q15 min PRN   Or  glucagon, 1 mg, q15 min PRN  dextrose, 25 g, q15 min PRN   Or  glucagon, 1 mg, q15 min PRN  glucagon, 1 mg, q15 min PRN  hydrALAZINE, 10 mg, q4h PRN  HYDROmorphone, 0.2 mg, q4h PRN  artificial tears, 2 drop, PRN  magnesium  "sulfate, 2 g, q6h PRN  magnesium sulfate, 4 g, q6h PRN  microfibrllar collagen, , PRN  mupirocin, , BID PRN  naloxone, 0.2 mg, q5 min PRN  ondansetron, 4 mg, q4h PRN  oxyCODONE, 5 mg, q4h PRN  sodium chloride, 1 spray, 4x daily PRN        PHYSICAL EXAM:   Visit Vitals  BP 78/74   Pulse 78   Temp 36.4 °C (97.5 °F) (Temporal)   Resp 16   Ht 1.549 m (5' 0.98\")   Wt 96 kg (211 lb 10.3 oz)   SpO2 100%   BMI 40.01 kg/m²   OB Status Hysterectomy   Smoking Status Never   BSA 2.03 m²     Wt Readings from Last 5 Encounters:   03/26/24 96 kg (211 lb 10.3 oz)   12/07/23 92.1 kg (203 lb)   12/01/23 93 kg (205 lb)   11/29/23 92.9 kg (204 lb 12.8 oz)   11/09/23 91.3 kg (201 lb 3.2 oz)     INTAKE/OUTPUT:  I/O last 3 completed shifts:  In: 3015.9 (31.4 mL/kg) [I.V.:1235.9 (12.9 mL/kg); Blood:750; NG/GT:430; IV Piggyback:600]  Out: 7586 (79 mL/kg) [Other:7586]  Weight: 96 kg          Vent settings:  Vent Mode: Volume control/assist control  FiO2 (%):  [30 %-40 %] 30 %  S RR:  [10-16] 16  S VT:  [400 mL] 400 mL  PEEP/CPAP (cm H2O):  [10 cm H20] 10 cm H20  IN SUP:  [10 cm H20] 10 cm H20  MAP (cm H2O):  [14-17] 14    Physical Exam:   - CONSTITUTION: young appearing female intubated on ECMO with impella  - NEUROLOGIC: More interactive but remains CAM +. Following in all extremities  - CARDIOVASCULAR: NSR. R fem VA ECMO, no bleeding at site. R axillary impella without bleeding. Monophasic signals in LE  - RESPIRATORY: Intubated.  Course breath sounds. Minimal ETT secretions. Some thin, pink oral secretions now  - GI: soft, hypoactive BS  - : anuric  - EXTREMITIES: all extremities warm. Significant edeam  - SKIN: left groin area of breakdown  - PSYCHIATRIC: JOSE    Daily Risk Screen  Intubated: plan to attempt to wean to extubate if pulm edema improves  Central line: access  Singh: n/s    Images:     Invasive Hemodynamics:    Most Recent Range Past 24hrs   BP (Art) 83/79 Arterial Line BP 1  Min: 65/62  Max: 115/106   MAP(Art) 81 mmHg " Arterial Line MAP 1 (mmHg)   Min: 61 mmHg  Max: 109 mmHg   RA/CVP   No data recorded   PA 41/34 No data recorded   PA(mean) 37 mmHg No data recorded   CO 5.5 L/min No data recorded   CI 2.8 L/min/m2 No data recorded   Mixed Venous 91.6 % SVO2 (%)  Min: 76.3 %  Max: 94.1 %   SVR  1115 (dyne*sec)/cm5 No data recorded         Assessment/Plan   34F with a PMHx sig for stage D HFrEF (PPCM) s/p ICD s/p CardioMEMs, hypothyroidism 2/2 thyroidectomy, obesity s/p gastric bypass (2017), and SLE who is s/p OHT (3/31/2022) with a post-OHT course complicated by RIJ/RUE DVTs, leukopenia, left groin seroma, and asymptomatic 2R ACR (11/2022) treated with steroids, s/p total hysterectomy (2023), Flu A (1/2024).     Originally presented to [facility] ED on 2/15/24 with complaints of N/V x 3 days and inability to keep medications down. Found to have acute systolic heart failure with primary graft failure and cardiogenic shock. Treated for suspected acute cellular vs. acute antibody mediated rejection; however, multiple cardiac biopsies negative for signs of rejection or other causes of graft failure. Course has been complicated by multiple MCS devices for refractory cardiogenic shock (right femoral IABP: 2/18/24 - 3/1/24, Left femoral VA ECMO: 2/19/24 - 2/29/24, Right axillary impella 5.5: 3/1/24 - remains in place, 2nd right femoral VA ECMO: 3/27/24 - remains in place), ARF requiring RRT, acute liver injury, intubation due to volume overload in setting of pulmonary edema, severe hemolysis 2/2 to impella malposition, mild CMV viremia, bilateral provoked IJ DVTs, multiple episodes of epistaxis & coagulopathies requiring ongoing blood product transfusions.        Transferred to CTICU on 3/27/24 following right femoral VA ECMO placement due to worsening cardiogenic shock and multi-organ failure despite Impella 5.5 support and multiple inotropes.       Plan:  NEURO: No history. Previously neuro intact with acute pain and intermittent insomnia.  Currently intubated and sedated on precedex infusion and following commands. More interactive/agitated at times and CAM +. Apneic on vent after oxy. ->  - Serial neuro and pain assessments  - continue precedex, increase for agitation/restlessness  - continue tylenol scheduled but will stop again if LFT uptrend.  - continue lidoderm patch for back pain  - Continue oxy to 5mg Q4 PRN. Dc scheduled oxy  - continue dilaudid prn for now while intubated  - continue trazodone for sleep   - PT/OT Consult  - CAM ICU score qshift. Sleep/wake cycle hygiene     ENT: Multiple episodes of epistaxis with interventions by ENT.  Most recently in OR 3/27 with L nare packed.  No current bleeding but more pink tinged oral secretions.  - appreciate ENT recs  - keflex while packing in place  - Prn ocean spray and mupiricin for dry nasal passages     Cardiac: Patient has a history of heart transplant in March of 2022 with primary graft dysfunction treated for acute cellular and antibody rejection without improvement with negative biopsy with multiple MCS devices for refractory cardiogenic shock (right femoral IABP: 2/18/24 - 3/1/24, Left femoral VA ECMO: 2/19/24 - 2/29/24, Right axillary impella 5.5: 3/1/24 - remains in place, 2nd right femoral VA ECMO: 3/27/24 - remains in place). Elevated LDH continuing to uptrend despite impella wean to P4 yesterday. Multiple attempts at repositioning impella previously.  ECHO 3/29 with impella potentially deep but not adjusted again. ECMO ~3.6L flow/100%/sweep 2, impella 5.5 P4 with flows 0.6-1.8L with resolved lactate and improving end organ function. Decreased impella flows when afterload higher. NSR and normotensive.   -->  - Maintain goal MAP 70-90  - continue full BiV support with impella and ECMO. No plan for attempts at weaning. Will be presented within the next week for kidney/heart transplant  - Continue to hold home rosuvastatin 40mg daily with elevated LFTs  - continue PO hydralazine 10mg  q8h with hold parameter  - continue as needed hydralazine for MAP >90  - continue to trend LDH  - Hold home amlodipine    Vascular: 3/27 arterial duplex with proximal SFA occlusin with collateral flow. C/f LLE ischemia (previous ECMO site) with loss of pulses- now monophasic with CK that had been normal and no longer trending. Feet warm with intact motor exam.   - appreciate vascular surgery following     PULM: No history. Intubated multiple times throughout hospital course for procedures; intubated 3/27 for OR. Again with acute respiratory failure persisting due to acute pulmonary edema which is now improving after aggressive volume removal. Appropriate peak and plateau pressures on ACVC. Apneic this morning on pressure support. Tolerated pressure support yesterday.  ECMO sweep 2, FIO2 100% with respiratory alkalosis. -->  - CXR daily  - continue goal negtive  - pressure support trial  - wean sweep and minute ventilation on vent for normal pH  - Maintain SPO2 > 92%     GI: History of gastric bypass and MMF colitis. Shock liver originally resolved; most recently elevated r/t likely congestive hepatopathy that is improving on ECMO. Abdominal pain improved with reduced myfortic dosing. Previous c/f GI bleed, likely blood from epistaxis; no current evidence of GI bleeding. H pylori negative, fecal calprotectin (elevated at 380), fecal lactoferrin (Positive 03/23/24). Previously on oral diet. OG in place at present with trickle TF and had BM last night after suppository and enema. -->  - Continue calcitriol 0.5mg daily, multivitamin, calcium carbonate 1,250mg BID  - continue PPI  - avoiding placing dobhoff with epistaxis.   - advance TF if not proceeding with extubation  - continue miralax BID & senna-colace BID with as needed suppository    : No history, baseline Cr 1.2-1.3. HIRAM originally on CVVH then with renal recovery but then again worsened and now dialysis dependent and failed diuretic challenges. Hypervolemia  improving with aggressive volume removal. Persisting hypophosphatemia despite repeated supplementation. -->  - RFP as clinically indicated, replete electrolytes per CTICU protocol.   - continue CVVH for net negative  - increase schedule enteral phos for hypophosphatemia  - Nephrology consult     ENDO: History of hypothyroidism and prediabetes. TSH elevated originally to malabsorption then with dosing adjusted by endo; TSH (3/17): 20.74, T4 1.11, T3: 1.4. Steroid induced hyperglycemia acceptable glycemic control on SSI. -->  - Maintain BG <180 with hypoglycemia protocol  - Continue SSI  - Continue Synthroid to 200mcg daily.   - Endocrine following, touch base Monday if any further monitoring needed     HEME: History of iron deficiency anemia and remote DVT; again with acute DVT LIJ and SVT left cephalic vein and right cephalic vein. Acute blood loss anemia with repeated transfusions with significant hemolysis but no evidence of active bleeding. Thrombocytopenia downtrending further since being on ECMO; last PF4 negative 3/30.  Blood tinged oral secretions. 's on heparin purge only.  -->    - 5 pk plts per multidisciplinary discussion  - Continue ferrous sulfate daily  - holding ASA for CAD prevention with recent thrombocytopenia  - change heparin purge to bicarb  - SCDs and for DVT prophylaxis  - Aranesp every 2 weeks  - Last type and screen: 3/31     Rejection/prophylaxis: S/p heart transplant 3/31/2022. Induction basiliximab. Donor HCV -, toxo -/-, CMV -/+. Mild ACR with +CD4 and negative HLAs however clinical presentation concern for acute cellular vs AMR rejection/stuttering rejection completed courses of thymo/PLEX/IVIG without clinical improvement.   - Steroids: Continue 10mg PO prednisone daily  - Tacrolimus: 1.5mg/1.5mg w/ daily levels drawn @ 0600  - Tacrolimus goal troughs: 8-10  - Myfortic acid: 180mg BID, holding since unable to crush.  Not starting MMF d/t hx of colitis  - Antifungals: nystatin  500,000units Q6  - Antivirals: valcyte 450mg Q48hrs   - Anti PCP & Toxoplasmosis: holding Bactrim SS daily due to thrombocytopenia     ID: Afebrile. C/f previous yeast infection. BC 3/27 NGTD x3 days. MRSA screen negative 3/28.   -->  - Trend temp q4h  - continue keflex while nasal packing in place   - f/u blood cx     Skin: Left groin wound from previous ecmo with wound consulted. Wound cx with NG 3/15.   - Preventative Mepilex dressings in place on sacrum and heels  - Change preventative Mepilex weekly or more frequently as indicated (when moist/soiled)   - Every shift skin assessment per nursing and weekly ICU skin rounds  - Moisture barrier to be applied with christopher care  - Active skin problems addressed with nursing on daily rounds  - wound recs from note 3/15 ordered      Proph:  SCDs  Heparin held with thrombocytopenia  PPI     G: Lines  RIJ Trialysis - 3/5. Limited options for replacement with DVTs.    R radial maxwell 3/27  PIV x2     Restraints: The indications and risks/benefits of non-violent/non self-destructive restraints were discussed and are indicated for the safety of the patient.     Code status: Full Code     I personally spent 60 minutes of critical care time directly and personally managing the patient exclusive of separately billable procedures.     A,B,C,D,E,F,G: reviewed     Discussed with Dr. Mehta  Dispo: CTICU care for now.    CTICU TEAM PHONE [phone]

## 2024-04-01 NOTE — PROGRESS NOTES
"Charla Bowles is a 34 y.o. female on day 46 of admission presenting with Cardiogenic shock (CMS/HCC).    Subjective   Pt intubated, minimal sedation. Awake and alert. Pt's mother at bedside.    Objective     Physical Exam  Vitals reviewed.   Constitutional:       General: She is awake.      Appearance: She is normal weight. She is ill-appearing.      Interventions: She is intubated.   HENT:      Head: Normocephalic and atraumatic.      Mouth/Throat:      Mouth: Mucous membranes are dry.   Eyes:      Extraocular Movements: Extraocular movements intact.      Pupils: Pupils are equal, round, and reactive to light.   Cardiovascular:      Rate and Rhythm: Regular rhythm. Tachycardia present.   Pulmonary:      Effort: Pulmonary effort is normal. She is intubated.      Breath sounds: Normal breath sounds.   Abdominal:      General: There is distension.      Palpations: Abdomen is soft.   Musculoskeletal:      Right lower leg: Edema present.      Left lower leg: Edema present.   Skin:     General: Skin is warm and dry.   Neurological:      General: No focal deficit present.      Mental Status: She is alert.   Psychiatric:         Mood and Affect: Mood normal. Affect is angry.         Speech: Speech is delayed.         Behavior: Behavior normal. Behavior is cooperative.         Last Recorded Vitals  Blood pressure 78/74, pulse 81, temperature 36 °C (96.8 °F), temperature source Axillary, resp. rate (!) 27, height 1.549 m (5' 0.98\"), weight 90.3 kg (199 lb 1.2 oz), SpO2 100 %.  Intake/Output last 3 Shifts:  I/O last 3 completed shifts:  In: 3472.8 (38.5 mL/kg) [I.V.:1239.8 (13.7 mL/kg); Blood:483.1; NG/GT:1050; IV Piggyback:700]  Out: 5865 (65 mL/kg) [Other:5865]  Weight: 90.3 kg     Relevant Results  Scheduled medications  acetaminophen, 650 mg, oral, q6h  calcium carbonate-vitamin D3, 1 tablet, oral, Daily  cephalexin, 500 mg, oral, q12h TRICIA  cyanocobalamin, 500 mcg, oral, Daily  darbepoetin floyd, 100 mcg, " subcutaneous, q14 days  ferrous sulfate (325 mg ferrous sulfate), 65 mg of iron, oral, Daily with breakfast  folic acid, 1 mg, oral, Daily  hydrALAZINE, 10 mg, oral, q8h  insulin lispro, 0-15 Units, subcutaneous, q4h  levothyroxine, 200 mcg, oral, Daily  lidocaine, 1 patch, transdermal, Daily  multivitamin with iron-minerals, 15 mL, oral, Daily  multivitamin with minerals, 1 tablet, oral, Daily  [Held by provider] mycophenolate, 180 mg, oral, BID  nystatin, 5 mL, Swish & Swallow, q6h  oxygen, , inhalation, Continuous - Inhalation  pantoprazole, 40 mg, intravenous, Daily  polyethylene glycol, 17 g, oral, BID  potassium, sodium phosphates, 2 packet, oral, q8h  predniSONE, 10 mg, oral, Daily  sennosides-docusate sodium, 2 tablet, oral, BID  [Held by provider] sulfamethoxazole-trimethoprim, 80 mg of trimethoprim, oral, Daily  tacrolimus, 1.5 mg, oral, q24h  tacrolimus, 1.5 mg, oral, q24h  traZODone, 25 mg, oral, Nightly  valGANciclovir, 450 mg, oral, q48h      Continuous medications  dexmedeTOMIDine, 0.1-1.5 mcg/kg/hr, Last Rate: 1 mcg/kg/hr (04/01/24 0859)  heparin, 100 Units/hr, Last Rate: 100 Units/hr (04/01/24 0921)  lactated Ringer's, 5 mL/hr, Last Rate: 5 mL/hr (04/01/24 0836)  PrismaSol 4/2.5, 25 mL/kg/hr  sodium bicarbonate infusion Impella Purge 25 mEq/1000 mL D5W, 10 mL/hr, Last Rate: 10 mL/hr (04/01/24 0836)      PRN medications  PRN medications: bisacodyl, calcium gluconate, calcium gluconate, dextrose, dextrose **OR** glucagon, hydrALAZINE, HYDROmorphone, artificial tears, magnesium sulfate, magnesium sulfate, microfibrllar collagen, mupirocin, naloxone, ondansetron, oxyCODONE, oxymetazoline, sodium chloride    Results for orders placed or performed during the hospital encounter of 02/15/24 (from the past 24 hour(s))   Blood Gas Arterial Full Panel   Result Value Ref Range    POCT pH, Arterial 7.41 7.38 - 7.42 pH    POCT pCO2, Arterial 40 38 - 42 mm Hg    POCT pO2, Arterial 170 (H) 85 - 95 mm Hg    POCT  SO2, Arterial 100 94 - 100 %    POCT Oxy Hemoglobin, Arterial 93.7 (L) 94.0 - 98.0 %    POCT Hematocrit Calculated, Arterial 25.0 (L) 36.0 - 46.0 %    POCT Sodium, Arterial 134 (L) 136 - 145 mmol/L    POCT Potassium, Arterial 4.2 3.5 - 5.3 mmol/L    POCT Chloride, Arterial 104 98 - 107 mmol/L    POCT Ionized Calcium, Arterial 1.08 (L) 1.10 - 1.33 mmol/L    POCT Glucose, Arterial 137 (H) 74 - 99 mg/dL    POCT Lactate, Arterial 0.3 (L) 0.4 - 2.0 mmol/L    POCT Base Excess, Arterial 0.7 -2.0 - 3.0 mmol/L    POCT HCO3 Calculated, Arterial 25.4 22.0 - 26.0 mmol/L    POCT Hemoglobin, Arterial 8.2 (L) 12.0 - 16.0 g/dL    POCT Anion Gap, Arterial 9 (L) 10 - 25 mmo/L    Patient Temperature 37.0 degrees Celsius    FiO2 30 %   CBC   Result Value Ref Range    WBC 4.6 4.4 - 11.3 x10*3/uL    nRBC 6.4 (H) 0.0 - 0.0 /100 WBCs    RBC 2.85 (L) 4.00 - 5.20 x10*6/uL    Hemoglobin 7.8 (L) 12.0 - 16.0 g/dL    Hematocrit 25.2 (L) 36.0 - 46.0 %    MCV 88 80 - 100 fL    MCH 27.4 26.0 - 34.0 pg    MCHC 31.0 (L) 32.0 - 36.0 g/dL    RDW 22.8 (H) 11.5 - 14.5 %    Platelets 65 (L) 150 - 450 x10*3/uL   Blood Gas Arterial Full Panel   Result Value Ref Range    POCT pH, Arterial 7.41 7.38 - 7.42 pH    POCT pCO2, Arterial 42 38 - 42 mm Hg    POCT pO2, Arterial 152 (H) 85 - 95 mm Hg    POCT SO2, Arterial 100 94 - 100 %    POCT Oxy Hemoglobin, Arterial 95.1 94.0 - 98.0 %    POCT Hematocrit Calculated, Arterial 33.0 (L) 36.0 - 46.0 %    POCT Sodium, Arterial 132 (L) 136 - 145 mmol/L    POCT Potassium, Arterial 4.4 3.5 - 5.3 mmol/L    POCT Chloride, Arterial 102 98 - 107 mmol/L    POCT Ionized Calcium, Arterial 1.05 (L) 1.10 - 1.33 mmol/L    POCT Glucose, Arterial 182 (H) 74 - 99 mg/dL    POCT Lactate, Arterial 0.4 0.4 - 2.0 mmol/L    POCT Base Excess, Arterial 1.7 -2.0 - 3.0 mmol/L    POCT HCO3 Calculated, Arterial 26.6 (H) 22.0 - 26.0 mmol/L    POCT Hemoglobin, Arterial 10.9 (L) 12.0 - 16.0 g/dL    POCT Anion Gap, Arterial 8 (L) 10 - 25 mmo/L     Patient Temperature 37.0 degrees Celsius    FiO2 30 %   POCT GLUCOSE   Result Value Ref Range    POCT Glucose 101 (H) 74 - 99 mg/dL   ECMO Arterial Blood Gas   Result Value Ref Range    POCT pH, Arterial ECMO 7.27 Reference range not established pH    POCT pCO2, Arterial ECMO 65 Reference range not established mm Hg    POCT pO2,  Arterial ECMO 501 Reference range not established mm Hg    POCT SO2, Arterial ECMO 100 Reference range not established %    POCT Oxy Hemoglobin, Arterial ECMO 95.7 Reference range not established %    POCT Base Excess, Arterial ECMO 1.2 Reference range not established mmol/L    POCT HCO3 Calculated, Arterial ECMO 29.8 Reference range not established mmol/L    Patient Temperature 37.0 degrees Celsius    FiO2 30 %   Reticulocytes   Result Value Ref Range    Retic % 3.9 (H) 0.5 - 2.0 %    Retic Absolute 0.105 (H) 0.018 - 0.083 x10*6/uL    Reticulocyte Hemoglobin 31 28 - 38 pg    Immature Retic fraction 43.9 (H) <=16.0 %   Haptoglobin   Result Value Ref Range    Haptoglobin <10 (L) 37 - 246 mg/dL   CBC and Auto Differential   Result Value Ref Range    WBC 4.0 (L) 4.4 - 11.3 x10*3/uL    nRBC 5.4 (H) 0.0 - 0.0 /100 WBCs    RBC 2.68 (L) 4.00 - 5.20 x10*6/uL    Hemoglobin 7.6 (L) 12.0 - 16.0 g/dL    Hematocrit 24.0 (L) 36.0 - 46.0 %    MCV 90 80 - 100 fL    MCH 28.4 26.0 - 34.0 pg    MCHC 31.7 (L) 32.0 - 36.0 g/dL    RDW 23.2 (H) 11.5 - 14.5 %    Platelets 36 (LL) 150 - 450 x10*3/uL    Immature Granulocytes %, Automated 7.2 (H) 0.0 - 0.9 %    Immature Granulocytes Absolute, Automated 0.29 0.00 - 0.70 x10*3/uL   ECMO Venous Blood Gas   Result Value Ref Range    POCT pH, Venous ECMO 7.26 Reference range not established pH    POCT pCO2, Venous ECMO 65 Reference range not established mm Hg    POCT pO2,  Venous  ECMO 60 Reference range not established mm Hg    POCT SO2, Venous ECMO 95 Reference range not established %    POCT Oxy Hemoglobin, Venous ECMO 90.2 Reference range not established %    POCT  Base Excess, Venous ECMO 0.5 Reference range not established mmol/L    POCT HCO3 Calculated, Venous ECMO 29.2 Reference range not established mmol/L    Patient Temperature 37.0 degrees Celsius    FiO2 30 %   Lactate Dehydrogenase   Result Value Ref Range    LDH 1,908 (H) 84 - 246 U/L   Calcium, Ionized   Result Value Ref Range    POCT Calcium, Ionized 0.97 (L) 1.1 - 1.33 mmol/L   Hepatic function panel   Result Value Ref Range    Albumin 4.5 3.4 - 5.0 g/dL    Bilirubin, Total 2.4 (H) 0.0 - 1.2 mg/dL    Bilirubin, Direct 1.3 (H) 0.0 - 0.3 mg/dL    Alkaline Phosphatase 177 (H) 33 - 110 U/L    ALT 21 7 - 45 U/L     (H) 9 - 39 U/L    Total Protein 6.0 (L) 6.4 - 8.2 g/dL   Coagulation Screen   Result Value Ref Range    Protime 13.6 (H) 9.8 - 12.8 seconds    INR 1.2 (H) 0.9 - 1.1    aPTT 31 27 - 38 seconds   Magnesium   Result Value Ref Range    Magnesium 2.45 (H) 1.60 - 2.40 mg/dL   Basic Metabolic Panel   Result Value Ref Range    Glucose 136 (H) 74 - 99 mg/dL    Sodium 138 136 - 145 mmol/L    Potassium 4.7 3.5 - 5.3 mmol/L    Chloride 102 98 - 107 mmol/L    Bicarbonate 27 21 - 32 mmol/L    Anion Gap 14 10 - 20 mmol/L    Urea Nitrogen 14 6 - 23 mg/dL    Creatinine 0.80 0.50 - 1.05 mg/dL    eGFR >90 >60 mL/min/1.73m*2    Calcium 8.1 (L) 8.6 - 10.6 mg/dL   Phosphorus   Result Value Ref Range    Phosphorus 3.8 2.5 - 4.9 mg/dL   Manual Differential   Result Value Ref Range    Neutrophils %, Manual 81.8 40.0 - 80.0 %    Lymphocytes %, Manual 11.3 13.0 - 44.0 %    Monocytes %, Manual 5.2 2.0 - 10.0 %    Eosinophils %, Manual 1.7 0.0 - 6.0 %    Basophils %, Manual 0.0 0.0 - 2.0 %    Seg Neutrophils Absolute, Manual 3.27 1.20 - 7.00 x10*3/uL    Lymphocytes Absolute, Manual 0.45 (L) 1.20 - 4.80 x10*3/uL    Monocytes Absolute, Manual 0.21 0.10 - 1.00 x10*3/uL    Eosinophils Absolute, Manual 0.07 0.00 - 0.70 x10*3/uL    Basophils Absolute, Manual 0.00 0.00 - 0.10 x10*3/uL    Total Cells Counted 115     RBC Morphology See  Below     Hypochromia Mild     RBC Fragments Many    TSH   Result Value Ref Range    Thyroid Stimulating Hormone 10.13 (H) 0.27 - 4.20 mIU/L   T4, free   Result Value Ref Range    Thyroxine, Free 0.70 (L) 0.90 - 1.70 ng/dL   Blood Gas Arterial Full Panel   Result Value Ref Range    POCT pH, Arterial 7.46 (H) 7.38 - 7.42 pH    POCT pCO2, Arterial 36 (L) 38 - 42 mm Hg    POCT pO2, Arterial 165 (H) 85 - 95 mm Hg    POCT SO2, Arterial 100 94 - 100 %    POCT Oxy Hemoglobin, Arterial 95.5 94.0 - 98.0 %    POCT Hematocrit Calculated, Arterial 26.0 (L) 36.0 - 46.0 %    POCT Sodium, Arterial 135 (L) 136 - 145 mmol/L    POCT Potassium, Arterial 4.6 3.5 - 5.3 mmol/L    POCT Chloride, Arterial 107 98 - 107 mmol/L    POCT Ionized Calcium, Arterial 1.00 (L) 1.10 - 1.33 mmol/L    POCT Glucose, Arterial 125 (H) 74 - 99 mg/dL    POCT Lactate, Arterial 0.5 0.4 - 2.0 mmol/L    POCT Base Excess, Arterial 1.8 -2.0 - 3.0 mmol/L    POCT HCO3 Calculated, Arterial 25.6 22.0 - 26.0 mmol/L    POCT Hemoglobin, Arterial 8.8 (L) 12.0 - 16.0 g/dL    POCT Anion Gap, Arterial 7 (L) 10 - 25 mmo/L    Patient Temperature 37.0 degrees Celsius    FiO2 30 %   Tacrolimus level   Result Value Ref Range    Tacrolimus  <2.0 <=15.0 ng/mL   Blood Gas Arterial Full Panel   Result Value Ref Range    POCT pH, Arterial 7.45 (H) 7.38 - 7.42 pH    POCT pCO2, Arterial 38 38 - 42 mm Hg    POCT pO2, Arterial 208 (H) 85 - 95 mm Hg    POCT SO2, Arterial 100 94 - 100 %    POCT Oxy Hemoglobin, Arterial 95.8 94.0 - 98.0 %    POCT Hematocrit Calculated, Arterial 24.0 (L) 36.0 - 46.0 %    POCT Sodium, Arterial 135 (L) 136 - 145 mmol/L    POCT Potassium, Arterial 4.5 3.5 - 5.3 mmol/L    POCT Chloride, Arterial 106 98 - 107 mmol/L    POCT Ionized Calcium, Arterial 1.07 (L) 1.10 - 1.33 mmol/L    POCT Glucose, Arterial 164 (H) 74 - 99 mg/dL    POCT Lactate, Arterial 0.5 0.4 - 2.0 mmol/L    POCT Base Excess, Arterial 2.3 -2.0 - 3.0 mmol/L    POCT HCO3 Calculated, Arterial 26.4 (H)  22.0 - 26.0 mmol/L    POCT Hemoglobin, Arterial 8.0 (L) 12.0 - 16.0 g/dL    POCT Anion Gap, Arterial 7 (L) 10 - 25 mmo/L    Patient Temperature 37.0 degrees Celsius    FiO2 30 %   POCT GLUCOSE   Result Value Ref Range    POCT Glucose 140 (H) 74 - 99 mg/dL   Blood Gas Arterial Full Panel   Result Value Ref Range    POCT pH, Arterial 7.44 (H) 7.38 - 7.42 pH    POCT pCO2, Arterial 38 38 - 42 mm Hg    POCT pO2, Arterial 168 (H) 85 - 95 mm Hg    POCT SO2, Arterial 100 94 - 100 %    POCT Oxy Hemoglobin, Arterial 96.5 94.0 - 98.0 %    POCT Hematocrit Calculated, Arterial 23.0 (L) 36.0 - 46.0 %    POCT Sodium, Arterial 134 (L) 136 - 145 mmol/L    POCT Potassium, Arterial 4.7 3.5 - 5.3 mmol/L    POCT Chloride, Arterial 105 98 - 107 mmol/L    POCT Ionized Calcium, Arterial 1.07 (L) 1.10 - 1.33 mmol/L    POCT Glucose, Arterial 191 (H) 74 - 99 mg/dL    POCT Lactate, Arterial 0.6 0.4 - 2.0 mmol/L    POCT Base Excess, Arterial 1.6 -2.0 - 3.0 mmol/L    POCT HCO3 Calculated, Arterial 25.8 22.0 - 26.0 mmol/L    POCT Hemoglobin, Arterial 7.8 (L) 12.0 - 16.0 g/dL    POCT Anion Gap, Arterial 8 (L) 10 - 25 mmo/L    Patient Temperature 37.0 degrees Celsius    FiO2 30 %   Calcium, Ionized   Result Value Ref Range    POCT Calcium, Ionized 1.03 (L) 1.1 - 1.33 mmol/L   Magnesium   Result Value Ref Range    Magnesium 2.29 1.60 - 2.40 mg/dL   Renal Function Panel   Result Value Ref Range    Glucose 186 (H) 74 - 99 mg/dL    Sodium 137 136 - 145 mmol/L    Potassium 4.6 3.5 - 5.3 mmol/L    Chloride 102 98 - 107 mmol/L    Bicarbonate 25 21 - 32 mmol/L    Anion Gap 15 10 - 20 mmol/L    Urea Nitrogen 13 6 - 23 mg/dL    Creatinine 0.85 0.50 - 1.05 mg/dL    eGFR >90 >60 mL/min/1.73m*2    Calcium 8.3 (L) 8.6 - 10.6 mg/dL    Phosphorus 2.5 2.5 - 4.9 mg/dL    Albumin 4.5 3.4 - 5.0 g/dL   POCT GLUCOSE   Result Value Ref Range    POCT Glucose 175 (H) 74 - 99 mg/dL     *Note: Due to a large number of results and/or encounters for the requested time period,  some results have not been displayed. A complete set of results can be found in Results Review.     XR chest 1 view    Result Date: 3/20/2024  Interpreted By:  Sandra Griffith, STUDY: XR CHEST 1 VIEW;  3/20/2024 7:27 am   INDICATION: Signs/Symptoms:Impella device.   COMPARISON: 03/19/2024   ACCESSION NUMBER(S): [id number]   ORDERING CLINICIAN: KONSTANTIN PERKINS   FINDINGS: Patient is status post median sternotomy. Right subclavian approach Impella device again identified in similar position. Right IJ catheter with the tip in the projection superior vena cava. CardioMEMS device projected over the cardiomediastinal silhouette.       CARDIOMEDIASTINAL SILHOUETTE: Cardiomediastinal silhouette is stable in size and configuration.   LUNGS: The lungs are hypoexpanded with crowding of the bronchovascular markings. Lung fields are essentially clear   ABDOMEN: No remarkable upper abdominal findings.   BONES: No acute osseous changes.       1.  No evidence of acute cardiopulmonary process.       MACRO: None   Signed by: Sandra Grififth 3/20/2024 11:28 AM Dictation workstation:   [id number]    XR chest 1 view    Result Date: 3/19/2024  Interpreted By:  Randy Bardales and Sheng Max STUDY: XR CHEST 1 VIEW;  3/19/2024 8:00 am   INDICATION: Signs/Symptoms:Impella.   COMPARISON: Chest radiograph dated 3/18/2024, 03/17/2024, 03/16/2024 and CT chest abdomen pelvis dated 03/17/2024   ACCESSION NUMBER(S): [id number]   ORDERING CLINICIAN: KONSTANTIN PERKINS   FINDINGS: AP radiograph of the chest     LINES AND DEVICES: Right subclavian approach Impella device project over the left ventricle not significantly changed from prior exam. Right internal jugular approach central venous catheter tip projects over the mid SVC. CardioMEMS device projects over the cardiomediastinal silhouette. Numerous intact median sternotomy wires. Surgical clips project over the right axilla.   CARDIOMEDIASTINAL SILHOUETTE: Stable enlargement the  cardiomediastinal silhouette. Aortic knob calcifications are seen.   LUNGS: Unchanged appearance of mild pulmonary edema. Persistent small left and trace right pleural effusion. No focal consolidation or pneumothorax.   ABDOMEN: No remarkable upper abdominal findings.   BONES: No acute osseous abnormality.       1. Right subclavian approach Impella device projects over the expected location of the left ventricle not significant changed from prior exam. 2. Unchanged appearance of mild pulmonary edema. 3. Additional medical devices as detailed above.   I personally reviewed the images/study and I agree with the findings as stated by Dr. Tee Joe. This study was interpreted at [place], [place].   MACRO: None   Signed by: Randy Bardales 3/19/2024 2:21 PM Dictation workstation:   [id number]    XR chest 1 view    Result Date: 3/18/2024  Interpreted By:  Randy Bardales and Sheng Max STUDY: XR CHEST 1 VIEW;  3/18/2024 7:43 am   INDICATION: Signs/Symptoms:Impella.   COMPARISON: Chest radiograph dated 3/17/2024, 03/16/2024, 03/15/2024 and CT chest abdomen pelvis dated 03/17/2024   ACCESSION NUMBER(S): [id number]   ORDERING CLINICIAN: KONSTANTIN PERKINS   FINDINGS: AP radiograph of the chest     LINES AND DEVICES: Right subclavian approach Impella device project over the left ventricle. Right internal jugular approach central venous catheter tip projects over the mid SVC. CardioMEMS device projects over the cardiomediastinal silhouette. Numerous intact median sternotomy wires. Surgical staples project over the right clavicular region.   CARDIOMEDIASTINAL SILHOUETTE: Stable enlargement of the cardiomediastinal silhouette.   LUNGS: Similar appearance of mild interstitial prominence suggestive of mild pulmonary edema. Persistent small left pleural effusion. No focal consolidation or pneumothorax.   ABDOMEN: No remarkable upper abdominal findings.   BONES: No acute osseous  abnormality.       1. Right subclvian approach Impella device projects over the expected location of the left ventricle. 2. Enlargement of the cardiomediastinal silhouette, persistent small left pleural effusion and mild pulmonary edema is compatible with volume overload not significantly changed from prior study     I personally reviewed the images/study and I agree with the findings as stated by Dr. Tee Joe. This study was interpreted at [facility], [place], [place].   MACRO: None   Signed by: Randy Bardales 3/18/2024 12:16 PM Dictation workstation:   [id number]    Electrocardiogram, 12-lead PRN ACS symptoms    Result Date: 3/18/2024  Sinus tachycardia Possible Left atrial enlargement Low voltage QRS RSR' or QR pattern in V1 suggests right ventricular conduction delay Cannot rule out Anterior infarct (cited on or before 14-JUN-2023) Abnormal ECG When compared with ECG of 02-MAR-2024 20:41, RSR' pattern in V1 is now Present Questionable change in initial forces of Anteroseptal leads    CT chest abdomen pelvis wo IV contrast    Result Date: 3/17/2024  Interpreted By:  Migel Degroot and Barbat Antonio STUDY: CT CHEST ABDOMEN PELVIS WO CONTRAST;  3/17/2024 2:32 pm   INDICATION: Signs/Symptoms:radiating pain in stomach to back.   COMPARISON: CT chest abdomen and pelvis without contrast 03/14/2024.   ACCESSION NUMBER(S): [id number]   ORDERING CLINICIAN: CLARICE JOHNSTON   TECHNIQUE: CT of the chest, abdomen and pelvis was performed. Contiguous axial images were obtained at 3 mm slice thickness through the chest, abdomen and pelvis. Coronal and sagittal reconstructions at 3 mm slice thickness were performed.  No intravenous or oral contrast agents were administered.   FINDINGS: Please note that the study is limited without intravenous contrast.   CHEST:   LUNG/PLEURA/LARGE AIRWAYS: Small bilateral pleural effusions with associated bibasilar atelectasis, similar to  prior exam. No focal consolidation or pneumothorax.   VESSELS: No evidence of a aortic hematoma. Unable to assess for additional acute aortic pathology in the setting of a noncontrast examination. Aorta and pulmonary arteries are normal caliber.  Atherosclerotic changes of the aorta.  No coronary artery calcifications are present.Right subclavian approach Impella device with the distal tip terminating at the apex of the left ventricle. Right-sided IJ approach hemodialysis catheter, with the distal tip projecting over the expected region of the mid-svc. Interval removal of [place] catheter.   HEART: The heart is mildly enlarged, similar to prior exam. Trace pericardial fluid is noted, similar to prior exam.   MEDIASTINUM AND YANE: No mediastinal, hilar or axillary lymph nodes are present. Esophagus: Within normal limits.   CHEST WALL AND LOWER NECK: Unchanged postsurgical changes consistent with median sternotomy. Similar component of moderate body wall anasarca. The visualized thyroid gland appears within normal limits.   ABDOMEN:   LIVER: The liver is enlarged measuring 20.5 cm in craniocaudal dimension.   BILE DUCTS: The bile ducts are not dilated.   GALLBLADDER: The gallbladder is not distended. There hyperdense content noted in the gallbladder, likely to represent sludge or vicarious excretion of contrast material from previous exam.   PANCREAS: There is mild haziness of the pancreatic head and duodenal pancreatic groove, improved from the previous exam, and limited in assessment given diffuse mesenteric fat stranding. The remainder of the pancreas appears unremarkable. There is no pancreatic ductal dilatation or peripancreatic fluid collections noted.   SPLEEN: Within normal limits.   ADRENAL GLANDS: Diffuse thickening of the left adrenal gland without focal nodule/mass, similar to prior exam   KIDNEYS AND URETERS: The kidneys have normal size and enhance symmetrically.  No hydroureteronephrosis or  nephroureterolithiasis is present.   PELVIS:   BLADDER: The urinary bladder is decompressed, limited for evaluation.   REPRODUCTIVE ORGANS: The uterus is surgically absent. Similar appearance of a 3.3 x 2.9 cm hypodense lesion within the right adnexa, which may represent an ovarian cyst.   BOWEL: Redemonstration of postsurgical changes consistent with gastric sleeve bariatric surgery. The small and large bowel are normal in caliber and demonstrate no wall thickening. The appendix is surgically absent.   VESSELS: The aorta and IVC are within normal limits, within the limitations of a noncontrast examination.   PERITONEUM/RETROPERITONEUM/LYMPH NODES: Mild-to-moderate component of simple fluid attenuating abdominopelvic fluid, predominantly within the pelvis. Diffuse haziness of the mesentery, similar to prior exam. No loculated fluid collection. Within limitations of this noncontrast examination, there is no evidence abdominopelvic lymphadenopathy.   ABDOMINAL WALL: Similar component of moderate body wall anasarca. Several injection granulomas within the anterior abdominal wall. Fat containing periumbilical hernia. Redemonstration of a 3.5 cm hyperdense collection in the left inguinal subcutaneous tissue, likely a small hematoma.   BONES: No suspicious osseous lesions are present. Degenerative discogenic disease is noted in the lower thoracic and lumbar spine.       1. No evidence of aortic hematoma on noncontrast CT. Unable to assess for addition acute aortic pathology in the setting of a noncontrast examination. 2. Incompletely characterized haziness of the pancreatic head and duodenal pancreatic groove, which are limited in assessment given diffuse mesenteric and retroperitoneal fat stranding. However, recommend correlation with lipase to rule out interstitial pancreatitis or groove pancreatitis. There is no pancreatic ductal dilatation or peripancreatic fluid collections noted. 3. Small bilateral pleural effusions  with associated bibasilar atelectasis. Moderate diffuse body wall anasarca. Diffuse mesenteric haziness. Mild-to-moderate simple fluid attenuating abdominopelvic ascites. Mild cardiomegaly. These findings are similar to prior exam. Correlate with patient's volume status. 4. Other findings and medical devices as above.     I personally reviewed the images/study and I agree with the findings as stated by Johnathon Joe MD. This study was interpreted at [facility], [place], [place]   MACRO: None   Signed by: Migel Springer 3/17/2024 6:46 PM Dictation workstation:   [id number]    XR chest 1 view    Result Date: 3/17/2024  Interpreted By:  Angelina Cagle, STUDY: XR CHEST 1 VIEW; 3/17/2024 7:30 am   INDICATION: Signs/Symptoms:impella am rounds.   COMPARISON: Radiograph dated 03/16/2024   ACCESSION NUMBER(S): [id number]   ORDERING CLINICIAN: CLARICE JOHNSTON   FINDINGS: Right IJ central venous catheter is stable. Impella device is unchanged in location. Pulmonary artery pressure monitoring device is projecting over the left hilar region. Interval removal of [place]-Estevan catheter.   The cardiac silhouette size is slightly enlarged, stable. Status post median sternotomy.   Left lung base and retrocardiac opacity. No edema or pneumothorax.   No acute osseous abnormality.       1. Small left basilar pleural effusion and left basilar atelectasis, unchanged. 2. Medical devices and postsurgical changes as described above.       Signed by: Angelina Narayan 3/17/2024 9:24 AM Dictation workstation:   [id number]    Transthoracic Echo (TTE) Limited    Result Date: 3/16/2024   [facility], [address] [place] [address]                Tel [phone] and Fax [phone] TRANSTHORACIC ECHOCARDIOGRAM REPORT  Patient Name:      MIGUEL DIMAS      Reading Physician:    [id number] Gurrola  MD Study Date:        3/15/2024            Ordering Provider:    [id number] ARANZA LOGAN MRN/PID:           [id number]             Fellow: Accession#:        [id number]         Nurse: Date of Birth/Age: 1990 / 34 years  Sonographer:          DESMOND Gender:            F                    Additional Staff: Weight:                                 Admission Status:     Inpatient -                                                               Routine BSA / BMI:         m2 / kg/m2           Encounter#:           [id number]                                         Department Location:  [address] Study Type:    TRANSTHORACIC ECHO (TTE) LIMITED Diagnosis/ICD: Acute systolic (congestive) heart failure (CHF)-I50.21 Indication:    Acute systolic CHF CPT Code:      Echo Limited-05786; Color Doppler-63214  Study Detail: The following Echo studies were performed: 2D and color flow.  PHYSICIAN INTERPRETATION: Left Ventricle: The left ventricular systolic function is severely decreased, with an estimated ejection fraction of 20-25%. There is global hypokinesis of the left ventricle with minor regional variations. The left ventricular cavity size is mild to moderately dilated. Left ventricular diastolic filling was not assessed. Left Atrium: The left atrium is enlarged. Right Ventricle: The right ventricle is mildly enlarged. There is reduced right ventricular systolic function. Right Atrium: The right atrium is moderately dilated. Aortic Valve: The aortic valve appears structurally normal. There is indeterminate aortic valve regurgitation. Mitral Valve: The mitral valve is normal in structure. There is moderate mitral valve regurgitation. Tricuspid Valve: The tricuspid valve was not well visualized. Tricuspid regurgitation was not assessed. Pulmonic Valve: The pulmonic valve was not assessed. Pulmonic valve regurgitation was not assessed. Pericardium: There is a trivial pericardial effusion. Aorta: The aortic root was not  assessed. In comparison to the previous echocardiogram(s): Compared with study from 3/14/2024, no significant change.  LEFT VENTRICULAR ASSIST DEVICE: LVAD: The patient has a(n) Impella LVAD device present. LVAD Comments: Impella tip is 4.2 cm from the AV plane. It is directed posteriorly and interacting with the papillary muscle.  CONCLUSIONS:  1. Left ventricular systolic function is severely decreased with a 20-25% estimated ejection fraction.  2. There is reduced right ventricular systolic function.  3. The left atrium is enlarged.  4. The right atrium is moderately dilated.  5. Moderate mitral valve regurgitation.  6. Compared with study from 3/14/2024, no significant change.  7. There is global hypokinesis of the left ventricle with minor regional variations. QUANTITATIVE DATA SUMMARY:  [id number] Abel Gurrola MD Electronically signed on 3/16/2024 at 3:08:44 PM  ** Final **     XR chest 1 view    Result Date: 3/16/2024  Interpreted By:  Angelina Cagle, STUDY: XR CHEST 1 VIEW; 3/16/2024 7:36 am   INDICATION: Signs/Symptoms:impella.   COMPARISON: Radiograph dated 03/15/2024   ACCESSION NUMBER(S): [id number]   ORDERING CLINICIAN: CLARICE JOHNSTON   FINDINGS: Lower extremity approach [place]-Estevan catheter is in place with the tip projecting over the main pulmonary artery. Stable Impella device and right IJ central venous catheter.   The cardiac silhouette size is stable. Status post median sternotomy.   Small left basilar pleural effusion and mild interstitial edema, unchanged. No sizable pneumothorax.   No acute osseous abnormality.       1. No significant interval change in a small left pleural effusion and mild interstitial edema. 2. Medical devices as described above       Signed by: Angelina Narayan 3/16/2024 11:29 AM Dictation workstation:   [id number]    XR chest 1 view    Result Date: 3/15/2024  Interpreted By:  Angelina Cagle, STUDY: XR CHEST 1 VIEW; 3/15/2024 8:09 am   INDICATION:  Signs/Symptoms:SGC positioning.   COMPARISON: Radiograph dated 03/14/2024   ACCESSION NUMBER(S): [id number]   ORDERING CLINICIAN: JOANNA KEANE   FINDINGS: Lower extremity approach [place] Estevan catheter is projecting over the main pulmonary artery. Impella device is stable. Right IJ central venous catheter is in the mid SVC.   Cardiac silhouette size is stable patient is status post median sternotomy.   Interval improvement in pulmonary edema. Small left pleural effusion and left basilar atelectasis. No sizable pneumothorax.   No acute osseous abnormality.       1. Interval improved pulmonary edema plan 2. Small left pleural effusion and left basilar atelectasis. 3. Medical devices and postsurgical changes as described above.       Signed by: Angelina Narayan 3/15/2024 6:57 PM Dictation workstation:   [id number]    CT chest abdomen pelvis wo IV contrast    Result Date: 3/14/2024  Interpreted By:  Chandra Galicia and Ohs Zachary STUDY: CT CHEST ABDOMEN PELVIS WO CONTRAST;  3/14/2024 9:49 am   INDICATION: Signs/Symptoms:Impella placement. 33 y/o  F with Signs/Symptoms:Impella placement.   COMPARISON: CT chest 03/07/2024.   ACCESSION NUMBER(S): [id number]   ORDERING CLINICIAN: ENRIKE WILD   TECHNIQUE: CT of the chest, abdomen and pelvis was performed. Contiguous axial images were obtained at 3 mm slice thickness through the chest, abdomen and pelvis in 2 mm slice thickness through the chest. Coronal and sagittal reconstructions at 3 mm slice thickness were performed. No intravenous or oral contrast agents were administered.   FINDINGS: Please note that the study is limited without intravenous contrast.   CHEST:   LUNG/PLEURA/LARGE AIRWAYS: Low lung volumes bilaterally. Small to moderate bilateral pleural effusions with adjacent atelectasis. No focal consolidation or pneumothorax.   VESSELS: Right IJ central venous catheter with distal tip at the mid SVC. Right subclavian approach Impella device with  distal tip at the apex of the left ventricle. Right femoral approach [place]-Estevan catheter with distal tip at the proximal left pulmonary artery. Aorta and pulmonary arteries are normal caliber.  No atherosclerotic changes of the aorta. No significant atherosclerotic changes of the coronary arteries.   HEART: The heart is enlarged, similar to prior exam. No pericardial effusion   MEDIASTINUM AND YANE: No mediastinal, hilar or axillary lymph nodes are present.  The esophagus appears normal.   CHEST WALL AND LOWER NECK: Median sternotomy wires noted. Mild anasarca. The visualized thyroid gland appears within normal limits.   ABDOMEN:   LIVER: The liver is mildly enlarged measuring 22.2 cm in the craniocaudal direction without focal lesions.   BILE DUCTS: The bile ducts are not dilated.   GALLBLADDER: The gallbladder is not distended and without calcified stones.   PANCREAS: The pancreas appears unremarkable.   SPLEEN: Within normal limits.   ADRENAL GLANDS: Diffuse thickening of the left adrenal gland without focal nodule/mass. The right adrenal gland is not well visualized.   KIDNEYS AND URETERS: The kidneys have normal size.  No hydroureteronephrosis or nephroureterolithiasis is present.   PELVIS:   BLADDER: The urinary bladder is decompressed, limited for evaluation.   REPRODUCTIVE ORGANS: The uterus is surgically absent. There is a 3.2 x 2.9 cm hypodense lesion in the right adnexa which may represent an ovarian cyst.   BOWEL: Postsurgical changes are noted consistent with prior gastric sleeve bariatric surgery.  The small and large bowel are normal in caliber and demonstrate no wall thickening.  The appendix is surgically absent.   VESSELS: The aorta and IVC are within normal limits.   PERITONEUM/RETROPERITONEUM/LYMPH NODES: Diffuse haziness of the mesentery. Moderate amount of layering ascites, predominantly within the pelvis. No loculated fluid collection. No abdominopelvic lymphadenopathy is present.   ABDOMINAL  WALL: Moderate diffuse anasarca. Left and right lower quadrant subcutaneous soft tissue nodules, favored to represent injection granulomas (series 2, image 127, 153, and 163).   BONES: No suspicious osseous lesions are identified.       Chest 1.  Right subclavian approach Impella device with distal tip of the apex of the left ventricle. Other medical devices as above. 2. Low lung volumes bilaterally with small-to-moderate bilateral pleural effusions and adjacent atelectasis.   Abdomen-Pelvis 1.  Diffuse haziness of the mesentery, moderate amount of layering ascites within the abdomen/pelvis, and moderate diffuse anasarca, likely secondary to patient's poor cardiac function. Correlate with patient's volume status.   I personally reviewed the images/study and I agree with the findings as stated by Dr. Bradley Murillo. This study was interpreted at [facility], [place], [place].   MACRO: None   Signed by: Chandra Galicia 3/14/2024 3:38 PM Dictation workstation:   [id number]    Transthoracic Echo (TTE) Limited    Result Date: 3/14/2024   [facility], [address] [place] [address]                Tel [phone] and Fax [phone] TRANSTHORACIC ECHOCARDIOGRAM REPORT  Patient Name:      MIGUEL DIMAS      Reading Physician:    [id number] Evelyn Mary MD Study Date:        3/14/2024            Ordering Provider:    [id number] JOANNA KEANE MRN/PID:           [id number]             Fellow:               [id number] Brionna Stiles MD Accession#:        [id number]         Nurse: Date of Birth/Age: 1990 / 34 years  Sonographer:          Raven Arredondo RDCS Gender:            F                    Additional Staff: Height:            152.40 cm            Admit Date:           2/15/2024 Weight:             102.06 kg            Admission Status:     Inpatient -                                                               Routine BSA / BMI:         1.96 m2 / 43.94      Encounter#:           [id number]                    kg/m2                                         Department Location:  [place] [address] MICU Blood Pressure: 114 /86 mmHg Study Type:    TRANSTHORACIC ECHO (TTE) LIMITED Diagnosis/ICD: Cardiogenic shock-R57.0 Indication:    Cardiogenic shock CPT Code:      Echo Limited-68386; Doppler Limited-23459; Color Doppler-46676 Patient History: Pertinent History: Cardiomyopathy, Previous DVT, Palpitations and Dyspnea. Heart                    tx 3/31/22,Heart tx rejection,Cardiogenic                    shock,ESRD,Lupus,Impella 3/1/24. Study Detail: The following Echo studies were performed: 2D, M-Mode, Doppler and               color flow. Technically challenging study due to patient lying in               supine position.  PHYSICIAN INTERPRETATION: Left Ventricle: The left ventricular systolic function is severely decreased, with an estimated ejection fraction of 25-30%. There is global hypokinesis of the left ventricle with minor regional variations. The left ventricular cavity size is normal. Left ventricular diastolic filling was indeterminate. Left Atrium: The left atrium is consistent with transplant. Right Ventricle: The right ventricle is mildly enlarged. There is mildly reduced right ventricular systolic function. A device is visualized in the right ventricle. Right Atrium: The right atrium is consistent with a transplant. There is a device visualized in the right atrium. Aortic Valve: The aortic valve was not well visualized. There is indeterminate aortic valve regurgitation. Mitral Valve: The mitral valve is normal in structure. There is moderate mitral valve regurgitation which is posteriorly directed. Tricuspid Valve: The tricuspid valve is structurally normal. There is moderate to severe tricuspid  regurgitation. The right ventricular systolic pressure is unable to be estimated. Pulmonic Valve: The pulmonic valve is structurally normal. Pulmonic valve regurgitation was not assessed. Pericardium: There is no pericardial effusion noted. Aorta: The aortic root is normal. In comparison to the previous echocardiogram(s): Compared with study from 3/11/2024, the position of the Impella looks similar. There appears to be moderate to severe TR which was not assessed on prior study.  LEFT VENTRICULAR ASSIST DEVICE: LVAD: The patient has a(n) Impella LVAD device present. Outflow Cannula: Visualization of the outflow cannula is technically difficult. LVAD Comments: The inflow cannula is visualized ~ 4.2cm from the aortic valve. The cannula is angled posteriorly within the LV cavity.  CONCLUSIONS:  1. Left ventricular systolic function is severely decreased with a 25-30% estimated ejection fraction.  2. There is mildly reduced right ventricular systolic function.  3. Moderate mitral valve regurgitation.  4. Moderate to severe tricuspid regurgitation visualized.  5. There is global hypokinesis of the left ventricle with minor regional variations. QUANTITATIVE DATA SUMMARY: 2D MEASUREMENTS:                           Normal Ranges: LAs:           4.73 cm    (2.7-4.0cm) IVSd:          1.47 cm    (0.6-1.1cm) LVPWd:         1.27 cm    (0.6-1.1cm) LVIDd:         4.00 cm    (3.9-5.9cm) LVIDs:         3.57 cm LV Mass Index: 102.9 g/m2 LV % FS        10.8 % LV SYSTOLIC FUNCTION BY 2D PLANIMETRY (MOD):                     Normal Ranges: EF-A4C View: 36.1 % (>=55%) EF-A2C View: 45.0 % EF-Biplane:  40.1 % AORTIC VALVE:                        Normal Ranges: LVOT Diameter: 1.88 cm (1.8-2.4cm) TRICUSPID VALVE/RVSP:                             Normal Ranges: Peak TR Velocity: 2.61 m/s RV Syst Pressure: 30.2 mmHg (< 30mmHg)  [id number] Evelyn Mary MD Electronically signed on 3/14/2024 at 2:23:08 PM  ** Final **     XR chest 1 view    Result  Date: 3/14/2024  Interpreted By:  Omari Gutierrez, STUDY: XR CHEST 1 VIEW;  3/14/2024 7:20 am   INDICATION: Signs/Symptoms:SGC positioning.   COMPARISON: 03/13/2024   ACCESSION NUMBER(S): [id number]   ORDERING CLINICIAN: JOANNA KEANE   FINDINGS: AP radiograph of the chest was provided.   Impella device entering from the right axillary artery remains in satisfactory position. [place] catheter entering via the inferior vena cava is again noted. The tip appears directed toward the right pulmonary artery. Central venous catheter entering via the right jugular vein remains unchanged in position within the superior vena cava above the cavoatrial junction. CardioMEMS device is again noted on the left.   CARDIOMEDIASTINAL SILHOUETTE: Cardiomediastinal silhouette is large but stable in size and configuration.   LUNGS: There is slight recurrence of vascular congestion relative to the previous study. There is no segmental consolidation. The pleural spaces appear clear.   ABDOMEN: No remarkable upper abdominal findings.   BONES: No acute osseous changes.       1.  Mild recurrence of vascular congestion in the interim. No other significant changes.       MACRO: None   Signed by: Omrai Gutierrez 3/14/2024 9:33 AM Dictation workstation:   [id number]    XR chest 1 view    Result Date: 3/14/2024  Interpreted By:  Omari Gutierrez,  and Coco Macais STUDY: XR CHEST 1 VIEW;  3/13/2024 11:33 pm; 3/13/2024 11:57 pm; 3/13/2024 11:59 pm; 3/13/2024 11:43 pm; 3/13/2024 11:56 pm; 3/13/2024 11:58 pm   INDICATION: Signs/Symptoms:SCG placement check; Signs/Symptoms:SGC placement check.   COMPARISON: 03/13/2024 at 10:41 a.m.   ACCESSION NUMBER(S): [id number]; [id number]; [id number]; [id number]; [id number]; [id number]; [id number]   ORDERING CLINICIAN: ENRIKE WILD   FINDINGS: A series of 7 AP radiographs of the chest were performed between 11:31 PM and 11:59 PM. They are apparently obtained for assessment of [place]-Estevan  catheter position. The initial image, accession number concluding with -[id number] is time-stamped 11:31 PM.  On the patient history, that image is entered out of order, and is recorded as having been obtained at 11:56 PM. The subsequent images, beginning with accession number terminating [id number] appear to be in proper time order.   On the initial image obtained at 11:31 PM, the [place]-Estevan catheter that enters via the inferior vena cava is seen with the tip at the bifurcation of the pulmonary artery. Several subsequent images show it extending further distally within the left pulmonary artery. The final 2 images show the tip at the pulmonary artery bifurcation.   Right IJ approach central venous catheter tip projects over lower SVC. CardioMEMS device is again noted on the left as previously described.   Impella device via right axillary artery remains in satisfactory position.     CARDIOMEDIASTINAL SILHOUETTE: Cardiomediastinal silhouette is enlarged but stable in size and configuration.   LUNGS: No focal consolidation, sizeable pleural effusion or pneumothorax. Since the earlier exam performed at 10:41 AM, there has been improvement in the degree of vascular congestion and edema.   ABDOMEN: No remarkable upper abdominal findings.   BONES: No acute osseous changes.         1. Overall improvement in the appearance of the chest since the earlier exam performed the morning of this date. 2. [place]-Estevan catheter tip ultimately lies at the bifurcation the main pulmonary artery.     I personally reviewed the images/study and I agree with the findings as stated by resident physician Dr. Gilberto Caban . This study was interpreted at [facility], [place], [place].   MACRO: None   Signed by: Omari Gutierrez 3/14/2024 9:16 AM Dictation workstation:   [id number]    XR chest 1 view    Result Date: 3/14/2024  Interpreted By:  Omari Gutierrez and Afshari Mirak Sohrab STUDY: XR CHEST 1 VIEW;  3/13/2024  11:33 pm; 3/13/2024 11:57 pm; 3/13/2024 11:59 pm; 3/13/2024 11:43 pm; 3/13/2024 11:56 pm; 3/13/2024 11:58 pm   INDICATION: Signs/Symptoms:SCG placement check; Signs/Symptoms:SGC placement check.   COMPARISON: 03/13/2024 at 10:41 a.m.   ACCESSION NUMBER(S): [id number]; [id number]; [id number]; [id number]; [id number]; [id number]; [id number]   ORDERING CLINICIAN: ENRIKE WILD   FINDINGS: A series of 7 AP radiographs of the chest were performed between 11:31 PM and 11:59 PM. They are apparently obtained for assessment of [place]-Estevan catheter position. The initial image, accession number concluding with -[id number] is time-stamped 11:31 PM.  On the patient history, that image is entered out of order, and is recorded as having been obtained at 11:56 PM. The subsequent images, beginning with accession number terminating [id number] appear to be in proper time order.   On the initial image obtained at 11:31 PM, the [place]-Estevan catheter that enters via the inferior vena cava is seen with the tip at the bifurcation of the pulmonary artery. Several subsequent images show it extending further distally within the left pulmonary artery. The final 2 images show the tip at the pulmonary artery bifurcation.   Right IJ approach central venous catheter tip projects over lower SVC. CardioMEMS device is again noted on the left as previously described.   Impella device via right axillary artery remains in satisfactory position.     CARDIOMEDIASTINAL SILHOUETTE: Cardiomediastinal silhouette is enlarged but stable in size and configuration.   LUNGS: No focal consolidation, sizeable pleural effusion or pneumothorax. Since the earlier exam performed at 10:41 AM, there has been improvement in the degree of vascular congestion and edema.   ABDOMEN: No remarkable upper abdominal findings.   BONES: No acute osseous changes.         1. Overall improvement in the appearance of the chest since the earlier exam performed the morning of this date. 2.  [place]-Estevan catheter tip ultimately lies at the bifurcation the main pulmonary artery.     I personally reviewed the images/study and I agree with the findings as stated by resident physician Dr. Gilberto Caban . This study was interpreted at [facility], [place], [place].   MACRO: None   Signed by: Omari Gutierrez 3/14/2024 9:16 AM Dictation workstation:   [id number]    XR chest 1 view    Result Date: 3/14/2024  Interpreted By:  Omari Gutierrez,  and Coco Macias STUDY: XR CHEST 1 VIEW;  3/13/2024 11:33 pm; 3/13/2024 11:57 pm; 3/13/2024 11:59 pm; 3/13/2024 11:43 pm; 3/13/2024 11:56 pm; 3/13/2024 11:58 pm   INDICATION: Signs/Symptoms:SCG placement check; Signs/Symptoms:SGC placement check.   COMPARISON: 03/13/2024 at 10:41 a.m.   ACCESSION NUMBER(S): [id number]; [id number]; [id number]; [id number]; [id number]; [id number]; [id number]   ORDERING CLINICIAN: ENRIKE WILD   FINDINGS: A series of 7 AP radiographs of the chest were performed between 11:31 PM and 11:59 PM. They are apparently obtained for assessment of [place]-Estevan catheter position. The initial image, accession number concluding with -[id number] is time-stamped 11:31 PM.  On the patient history, that image is entered out of order, and is recorded as having been obtained at 11:56 PM. The subsequent images, beginning with accession number terminating [id number] appear to be in proper time order.   On the initial image obtained at 11:31 PM, the [place]-Estevan catheter that enters via the inferior vena cava is seen with the tip at the bifurcation of the pulmonary artery. Several subsequent images show it extending further distally within the left pulmonary artery. The final 2 images show the tip at the pulmonary artery bifurcation.   Right IJ approach central venous catheter tip projects over lower SVC. CardioMEMS device is again noted on the left as previously described.   Impella device via right axillary artery remains in  satisfactory position.     CARDIOMEDIASTINAL SILHOUETTE: Cardiomediastinal silhouette is enlarged but stable in size and configuration.   LUNGS: No focal consolidation, sizeable pleural effusion or pneumothorax. Since the earlier exam performed at 10:41 AM, there has been improvement in the degree of vascular congestion and edema.   ABDOMEN: No remarkable upper abdominal findings.   BONES: No acute osseous changes.         1. Overall improvement in the appearance of the chest since the earlier exam performed the morning of this date. 2. [place]-Estevan catheter tip ultimately lies at the bifurcation the main pulmonary artery.     I personally reviewed the images/study and I agree with the findings as stated by resident physician Dr. Gilberto Caban . This study was interpreted at [place], [place].   MACRO: None   Signed by: Omari Guteirrez 3/14/2024 9:16 AM Dictation workstation:   [id number]    XR chest 1 view    Result Date: 3/14/2024  Interpreted By:  Omari Gutierrez and Afshari Mirak Sohrab STUDY: XR CHEST 1 VIEW;  3/13/2024 11:33 pm; 3/13/2024 11:57 pm; 3/13/2024 11:59 pm; 3/13/2024 11:43 pm; 3/13/2024 11:56 pm; 3/13/2024 11:58 pm   INDICATION: Signs/Symptoms:SCG placement check; Signs/Symptoms:SGC placement check.   COMPARISON: 03/13/2024 at 10:41 a.m.   ACCESSION NUMBER(S): [id number]; [id number]; [id number]; [id number]; [id number]; [id number]; [id number]   ORDERING CLINICIAN: ENRIKE WILD   FINDINGS: A series of 7 AP radiographs of the chest were performed between 11:31 PM and 11:59 PM. They are apparently obtained for assessment of [place]-Estevan catheter position. The initial image, accession number concluding with -[id number] is time-stamped 11:31 PM.  On the patient history, that image is entered out of order, and is recorded as having been obtained at 11:56 PM. The subsequent images, beginning with accession number terminating -[id number] appear to be in proper  time order.   On the initial image obtained at 11:31 PM, the [place]-Estevan catheter that enters via the inferior vena cava is seen with the tip at the bifurcation of the pulmonary artery. Several subsequent images show it extending further distally within the left pulmonary artery. The final 2 images show the tip at the pulmonary artery bifurcation.   Right IJ approach central venous catheter tip projects over lower SVC. CardioMEMS device is again noted on the left as previously described.   Impella device via right axillary artery remains in satisfactory position.     CARDIOMEDIASTINAL SILHOUETTE: Cardiomediastinal silhouette is enlarged but stable in size and configuration.   LUNGS: No focal consolidation, sizeable pleural effusion or pneumothorax. Since the earlier exam performed at 10:41 AM, there has been improvement in the degree of vascular congestion and edema.   ABDOMEN: No remarkable upper abdominal findings.   BONES: No acute osseous changes.         1. Overall improvement in the appearance of the chest since the earlier exam performed the morning of this date. 2. [place]-Est[place]an catheter tip ultimately lies at the bifurcation the main pulmonary artery.     I personally reviewed the images/study and I agree with the findings as stated by resident physician Dr. Gilberto Caban . This study was interpreted at [place], [place].   MACRO: None   Signed by: Omari Gutierrez 3/14/2024 9:16 AM Dictation workstation:   [id number]    XR chest 1 view    Result Date: 3/14/2024  Interpreted By:  Omari Gtuierrez and Afshari Mirak Sohrab STUDY: XR CHEST 1 VIEW;  3/13/2024 11:33 pm; 3/13/2024 11:57 pm; 3/13/2024 11:59 pm; 3/13/2024 11:43 pm; 3/13/2024 11:56 pm; 3/13/2024 11:58 pm   INDICATION: Signs/Symptoms:SCG placement check; Signs/Symptoms:SGC placement check.   COMPARISON: 03/13/2024 at 10:41 a.m.   ACCESSION NUMBER(S): [id number]; [id number]; [id number]; [id number];  [id number]; [id number]; [id number]   ORDERING CLINICIAN: ENRIKE WILD   FINDINGS: A series of 7 AP radiographs of the chest were performed between 11:31 PM and 11:59 PM. They are apparently obtained for assessment of [place]-Estevan catheter position. The initial image, accession number concluding with -[id number] is time-stamped 11:31 PM.  On the patient history, that image is entered out of order, and is recorded as having been obtained at 11:56 PM. The subsequent images, beginning with accession number terminating -[id number] appear to be in proper time order.   On the initial image obtained at 11:31 PM, the [place]-Estevan catheter that enters via the inferior vena cava is seen with the tip at the bifurcation of the pulmonary artery. Several subsequent images show it extending further distally within the left pulmonary artery. The final 2 images show the tip at the pulmonary artery bifurcation.   Right IJ approach central venous catheter tip projects over lower SVC. CardioMEMS device is again noted on the left as previously described.   Impella device via right axillary artery remains in satisfactory position.     CARDIOMEDIASTINAL SILHOUETTE: Cardiomediastinal silhouette is enlarged but stable in size and configuration.   LUNGS: No focal consolidation, sizeable pleural effusion or pneumothorax. Since the earlier exam performed at 10:41 AM, there has been improvement in the degree of vascular congestion and edema.   ABDOMEN: No remarkable upper abdominal findings.   BONES: No acute osseous changes.         1. Overall improvement in the appearance of the chest since the earlier exam performed the morning of this date. 2. [place]-Estevan catheter tip ultimately lies at the bifurcation the main pulmonary artery.     I personally reviewed the images/study and I agree with the findings as stated by resident physician Dr. Gilberto Caban . This study was interpreted at [facility], [place], [place].    MACRO: None   Signed by: Omari Gutierrez 3/14/2024 9:16 AM Dictation workstation:   [id number]    XR chest 1 view    Result Date: 3/14/2024  Interpreted By:  Omari Gutierrez,  and Coco Macias STUDY: XR CHEST 1 VIEW;  3/13/2024 11:33 pm; 3/13/2024 11:57 pm; 3/13/2024 11:59 pm; 3/13/2024 11:43 pm; 3/13/2024 11:56 pm; 3/13/2024 11:58 pm   INDICATION: Signs/Symptoms:SCG placement check; Signs/Symptoms:SGC placement check.   COMPARISON: 03/13/2024 at 10:41 a.m.   ACCESSION NUMBER(S): [id number]; [id number]; [id number]; [id number]; [id number]; [id number]; [id number]   ORDERING CLINICIAN: ENRIKE WILD   FINDINGS: A series of 7 AP radiographs of the chest were performed between 11:31 PM and 11:59 PM. They are apparently obtained for assessment of [place]-Estevan catheter position. The initial image, accession number concluding with -[id number] is time-stamped 11:31 PM.  On the patient history, that image is entered out of order, and is recorded as having been obtained at 11:56 PM. The subsequent images, beginning with accession number terminating -[id number] appear to be in proper time order.   On the initial image obtained at 11:31 PM, the [place]-Estevan catheter that enters via the inferior vena cava is seen with the tip at the bifurcation of the pulmonary artery. Several subsequent images show it extending further distally within the left pulmonary artery. The final 2 images show the tip at the pulmonary artery bifurcation.   Right IJ approach central venous catheter tip projects over lower SVC. CardioMEMS device is again noted on the left as previously described.   Impella device via right axillary artery remains in satisfactory position.     CARDIOMEDIASTINAL SILHOUETTE: Cardiomediastinal silhouette is enlarged but stable in size and configuration.   LUNGS: No focal consolidation, sizeable pleural effusion or pneumothorax. Since the earlier exam performed at 10:41 AM, there has been improvement in the degree of vascular  congestion and edema.   ABDOMEN: No remarkable upper abdominal findings.   BONES: No acute osseous changes.         1. Overall improvement in the appearance of the chest since the earlier exam performed the morning of this date. 2. [place]-Estevan catheter tip ultimately lies at the bifurcation the main pulmonary artery.     I personally reviewed the images/study and I agree with the findings as stated by resident physician Dr. Gilberto Caban . This study was interpreted at [place], [place].   MACRO: None   Signed by: Omari Gutierrez 3/14/2024 9:16 AM Dictation workstation:   [id number]    XR chest 1 view    Result Date: 3/14/2024  Interpreted By:  Omari Gutierrez,  and Coco Macias STUDY: XR CHEST 1 VIEW;  3/13/2024 11:33 pm; 3/13/2024 11:57 pm; 3/13/2024 11:59 pm; 3/13/2024 11:43 pm; 3/13/2024 11:56 pm; 3/13/2024 11:58 pm   INDICATION: Signs/Symptoms:SCG placement check; Signs/Symptoms:SGC placement check.   COMPARISON: 03/13/2024 at 10:41 a.m.   ACCESSION NUMBER(S): [id number]; [id number]; [id number]; [id number]; [id number]; [id number]; [id number]   ORDERING CLINICIAN: ENRIKE WILD   FINDINGS: A series of 7 AP radiographs of the chest were performed between 11:31 PM and 11:59 PM. They are apparently obtained for assessment of [place]-Estevan catheter position. The initial image, accession number concluding with -[id number] is time-stamped 11:31 PM.  On the patient history, that image is entered out of order, and is recorded as having been obtained at 11:56 PM. The subsequent images, beginning with accession number terminating -[id number] appear to be in proper time order.   On the initial image obtained at 11:31 PM, the [place]-Estevan catheter that enters via the inferior vena cava is seen with the tip at the bifurcation of the pulmonary artery. Several subsequent images show it extending further distally within the left pulmonary artery. The final 2 images show the tip  at the pulmonary artery bifurcation.   Right IJ approach central venous catheter tip projects over lower SVC. CardioMEMS device is again noted on the left as previously described.   Impella device via right axillary artery remains in satisfactory position.     CARDIOMEDIASTINAL SILHOUETTE: Cardiomediastinal silhouette is enlarged but stable in size and configuration.   LUNGS: No focal consolidation, sizeable pleural effusion or pneumothorax. Since the earlier exam performed at 10:41 AM, there has been improvement in the degree of vascular congestion and edema.   ABDOMEN: No remarkable upper abdominal findings.   BONES: No acute osseous changes.         1. Overall improvement in the appearance of the chest since the earlier exam performed the morning of this date. 2. [place]-Estevan catheter tip ultimately lies at the bifurcation the main pulmonary artery.     I personally reviewed the images/study and I agree with the findings as stated by resident physician Dr. Gilberto Caban . This study was interpreted at [facility], [place], [place].   MACRO: None   Signed by: Omari Gutierrez 3/14/2024 9:16 AM Dictation workstation:   [id number]    Upper extremity venous duplex bilateral    Result Date: 3/13/2024            [place] [address]   Tel [phone] and Fax [phone]  Vascular Lab Report [facility] UPPER EXTREMITY VENOUS DUPLEX BILATERAL  Patient Name:     MIGUEL DIMAS      Reading           [id number] Rhianna BASS                Physician: Study Date:       3/12/2024            Ordering          [id number] CLARICE WILL                                        Physician:        VICKIE MRN/PID:          [id number]             Technologist:     Chio Amaro T Accession#:       [id number]         Technologist 2: Date of           1990 / 34 years  Encounter#:       [id number] Birth/Age: Gender:           F  Admission Status: Inpatient            Location          [facility]                                        Performed:  Diagnosis/ICD: Localized (leg) edema-R60.0 CPT Codes:     11250 Peripheral venous duplex scan for DVT complete  **CRITICAL RESULT** Critical Result: New finding: 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  CONCLUSIONS: Right Upper Venous: No evidence of acute deep vein thrombus visualized in the right upper extremity. There is acute occlusive superficial venous thrombosis visualized in the mid cephalic vein. There are chronic changes visualized in the internal jugular vein. Line and Impella device in subclavian vein. Additional Findings; IV Line. Left Upper Venous: There is acute non-occlusive deep vein thrombosis visualized in the internal jugular vein. Acute superficial vein thrombosis in the mid cephalic vein.  Comparison: Compared with study from 3/4/2024, there are chronic changes in the right internal jugular vein.  Imaging & Doppler Findings:  Right               Compressible    Thrombus       Flow Internal Jugular      Partial        Chronic     Pulsatile Subclavian                                       Pulsatile Subclavian Proximal                   None Subclavian Mid          Yes           None       Pulsatile Subclavian Distal       Yes           None       Pulsatile Axillary                Yes           None       Pulsatile Brachial                Yes           None Cephalic                 No      Acute occlusive Basilic                 Yes           None  Left                Compress      Thrombus         Flow Internal Jugular    Partial  Acute non-occlusive Pulsatile Subclavian Proximal                 None         Pulsatile Subclavian Mid        Yes           None         Pulsatile Subclavian Distal     Yes           None         Pulsatile Axillary              Yes            None         Pulsatile Brachial              Yes           None Cephalic               No      Acute occlusive Basilic               Yes           None  [id number] Rhianna Dumont MD Electronically signed by [id number] Rhianna Dumont MD on 3/13/2024 at 9:04:03 PM  ** Final **     Lower extremity venous duplex bilateral    Result Date: 3/13/2024            [place] [address]   Tel [phone] and Fax [phone]  Vascular Lab Report [facility] US LOWER EXTREMITY VENOUS DUPLEX BILATERAL  Patient Name:     MIGUEL DIMAS      Reading           [id number] Rhianna BASS                Physician: Study Date:       3/12/2024            Ordering          [id number] CLARICE WILL                                        Physician:        VICKIE MRN/PID:          [id number]             Technologist:     Karley Vines [facility] Accession#:       [id number]         Technologist 2: Date of           1990 / 34 years  Encounter#:       [id number] Birth/Age: Gender:           F Admission Status: Outpatient           Location          [facility]                                        Performed:  Diagnosis/ICD: Localized (leg) edema-R60.0 CPT Codes:     80622 Peripheral venous duplex scan for DVT complete  CONCLUSIONS: Right Lower Venous: No evidence of acute deep vein thrombus visualized in the right lower extremity. Left Lower Venous: No evidence of acute deep vein thrombus visualized in the left lower extremity. Cystic structure noted in left groin measuring 1.79 cm x 1.08 cm.  Imaging & Doppler Findings:  Right                 Compressible Thrombus        Flow Distal External Iliac     Yes        None       Pulsatile CFV                       Yes        None       Pulsatile PFV                       Yes        None FV Proximal               Yes        None       Pulsatile FV Mid                    Yes        None FV Distal                 Yes        None Popliteal                  Yes        None   Spontaneous/Phasic Peroneal                  Yes        None PTV                       Yes        None  Left                  Compress Thrombus        Flow Distal External Iliac   Yes      None       Pulsatile CFV                     Yes      None       Pulsatile PFV                     Yes      None FV Proximal             Yes      None       Pulsatile FV Mid                  Yes      None FV Distal               Yes      None Popliteal               Yes      None   Spontaneous/Phasic Peroneal                Yes      None PTV                     Yes      None  [id number] Rhianna Dumont MD Electronically signed by [id number] Rhianna Dumont MD on 3/13/2024 at 8:59:50 PM  ** Final **     XR chest 1 view    Result Date: 3/13/2024  Interpreted By:  Omari Gutierrez, STUDY: XR CHEST 1 VIEW;  3/13/2024 7:42 am   INDICATION: Signs/Symptoms:PA catheter - ADHF.   COMPARISON: 03/11/2024   ACCESSION NUMBER(S): [id number]   ORDERING CLINICIAN: KONSTANTIN PERKINS   FINDINGS: AP radiograph of the chest was provided.   Impella device entering via the right axillary artery remains in good position. Central venous catheter entering via the right jugular vein is unchanged. CardioMEMS device is visible on the left as previously noted.   CARDIOMEDIASTINAL SILHOUETTE: Cardiomediastinal silhouette is enlarged but stable in size and configuration.   LUNGS: There is slight prominence of the pulmonary vascularity compared to the previous study that may indicate mild overload. Slight accentuation of the interstitial markings suggest mild edema.   ABDOMEN: No remarkable upper abdominal findings.   BONES: No acute osseous changes.       1.  Findings suggesting mild overload and developing edema.       MACRO: None   Signed by: Omari Gutierrez 3/13/2024 4:51 PM Dictation workstation:   [id number]    Cardiac Catheterization Procedure    Result Date: 3/13/2024  Recommendations:     Cardiac Catheterization Procedure    Result Date:  3/13/2024   [facility], Cath Lab, [address] [place] [address] Cardiovascular Catheterization Report Patient Name:      MIGUEL BASS Performing Physician:  [id number] Orellana MD Study Date:        3/13/2024             Verifying Physician:   Priya Orellana MD MRN/PID:           [id number]              Cardiologist/Co-scrub: Accession#:        [id number]          Ordering Provider:     [id number] CLARICE JOHNSTON Date of Birth/Age: 1990 / 34 years   Fellow: Gender:            F                     Fellow: Encounter#:        [id number]  Study: Right Heart Cath  Indications: Heart failure and cardiomyopathy. Heart failure.  Procedure Description: After infiltration with 2% Lidocaine, the was cannulated with a modified Seldinger technique. After infiltration of local anesthetic, the right femoral vein was identified with two-dimensional ultrasound. Under direct ultrasound visualization, the right femoral vein was cannulated with a micropuncture technique. A 9 [place] sheath was placed in the vein. A balloon tipped catheter was advanced through the right heart to record pressures. Cardiac output was calculated via the Shreyas method.  Right Heart Catheterization: A balloon tipped catheter was advanced through the right heart to record pressures. Cardiac output was calculated via the Shreyas method. Elevated left sided filling pressures with normal cardiac output. Elevated ventricular filling pressure. Cardiac output is normal. No evidence of shunt. No pulmonary vascular resistance.  Hemo Personnel: +---------------+---------+ Name           Duty      +---------------+---------+ Sourav Orellana MD 1 +---------------+---------+  Hemodynamic Pressures:   +----+----------+---------+-------------+-------------+---+----+-------+-------+ SiteDate Time   Phase  Systolic mmHg  Diastolic  ED MeanA-Wave V-Wave                  Name                    mmHg     mmHmmHg mmHg   mmHg                                                     g                    +----+----------+---------+-------------+-------------+---+----+-------+-------+   AO 3/13/2024     Rest          125           86     97                   9:16:03 AM                                                         +----+----------+---------+-------------+-------------+---+----+-------+-------+   RA 3/13/2024     Rest                               16     23     22     9:32:42 AM                                                         +----+----------+---------+-------------+-------------+---+----+-------+-------+   RV 3/13/2024     Rest           43            1 14                       9:35:09 AM                                                         +----+----------+---------+-------------+-------------+---+----+-------+-------+   PW 3/13/2024     Rest                               23     25     31     9:36:57 AM                                                         +----+----------+---------+-------------+-------------+---+----+-------+-------+   PA 3/13/2024     Rest           43           12     28                   9:37:20 AM                                                         +----+----------+---------+-------------+-------------+---+----+-------+-------+  Oxygen Saturation %: +-----------+----------+------------+ Sample SiteO2 Sat (%)HB (g/100ml) +-----------+----------+------------+          FA        96         7.4 +-----------+----------+------------+          RA        46         7.4 +-----------+----------+------------+          FA        96         7.4  +-----------+----------+------------+          PA        47         7.4 +-----------+----------+------------+  Cardiac Outputs: +---------------+------------------+-------+ LISA CO (l/min)LISA CI (l/min/m2)LISA SV +---------------+------------------+-------+             5.1               2.6   38.6 +---------------+------------------+-------+  Vascular Resistance Calculated Values (Wood Units): +-----+---+----+-------+----+----+---+----+----+----+-------+ PhasePVRPVRIPVR/SVRSVR SVRITPRTPRITVR TVRITPR/TVR +-----+---+----+-------+----+----+---+----+----+----+-------+ 0    1.01.8 0      [id number]       +-----+---+----+-------+----+----+---+----+----+----+-------+  Complications: No in-lab complications observed.  Cardiac Cath Post Procedure Notes: Post Procedure Diagnosis: Heart failure. Blood Loss:               Estimated blood loss during the procedure was 0 mls. Specimens Removed:        Number of specimen(s) removed: none.  Recommendations: Maximize medical therapy. ____________________________________________________________________________________ CONCLUSIONS:  1. Acute on chronic decompensated systolic heart failure. ICD 10 Codes: Acute systolic (congestive) heart failure (CHF)-I50.21  CPT Codes: Right Heart Cath O2/Cardiac output without biopsy (RHC)-13541  [id number] Sourav Orellana MD Performing Physician Electronically signed by [id number] Sourav Orellana MD on 3/13/2024 at 3:12:03 PM  ** Final **     XR chest 1 view    Result Date: 3/13/2024  Interpreted By:  Omari Gutierrez, STUDY: XR CHEST 1 VIEW;  3/13/2024 10:41 am   INDICATION: Signs/Symptoms:SGC positioning.   COMPARISON: 03/13/2024 at 7:26 a.m.   ACCESSION NUMBER(S): [id number]   ORDERING CLINICIAN: JOANNA KEANE   FINDINGS: AP radiograph of the chest performed at 10:34 a.m. was provided.   A [place]-Estevan catheter has been placed via the inferior vena cava. The tip of the catheter appears to lie within the main  pulmonary artery. I am uncertain whether it is directed toward the left or the right pulmonary artery.   Impella device entering via the right axillary artery is again noted and appears in satisfactory position. Right jugular venous catheter tip is unchanged in position. CardioMEMS device is again visible on the left.   CARDIOMEDIASTINAL SILHOUETTE: Cardiomediastinal silhouette is large but stable in size and configuration.   LUNGS: There is increasing prominence of the pulmonary vascularity suggesting increased overload. Accentuation of the interstitial markings consistent with edema also appears to have increased slightly in the interim.   ABDOMEN: No remarkable upper abdominal findings.   BONES: No acute osseous changes.       1.  [place]-Estevan catheter placement as described above. Increasing vascular congestion and edema.       MACRO: None   Signed by: Omari Gutierrez 3/13/2024 11:16 AM Dictation workstation:   [id number]    CT chest wo IV contrast    Result Date: 3/12/2024  Interpreted By:  Alton Piper, STUDY: CT CHEST WO IV CONTRAST;  3/7/2024 10:07 am   INDICATION: Signs/Symptoms:heart transplant workup.   COMPARISON: 2 02/2022   ACCESSION NUMBER(S): [id number]   ORDERING CLINICIAN: BEULAH MCGOVERN   TECHNIQUE: Helical data acquisition of the chest was obtained  without IV contrast material.  Images were reformatted in axial, coronal, and sagittal planes.   FINDINGS: LUNGS AND AIRWAYS: The trachea and central airways are patent. No endobronchial lesion.   Evaluation of the lung parenchyma demonstrates left basilar atelectasis and small left-sided pleural effusion. There are no suspicious lung nodules.   MEDIASTINUM AND YANE, LOWER NECK AND AXILLA: The visualized thyroid gland is within normal limits.   Scattered mediastinal lymph nodes are felt to be reactive in nature.   Esophagus appears within normal limits as seen.   HEART AND VESSELS: The thoracic aorta is of normal course and caliber without  vascular calcifications. Postoperative changes consistent with heart transplant. The patient is status post right subclavian Impella device placement with tip overlying the left ventricle.   Main pulmonary artery and its branches are normal in caliber.   No coronary artery calcifications are seen. The study is not optimized for evaluation of coronary arteries.   There is global chamber enlargement. Left ventricular Impella device in place.   No evidence of pericardial effusion.   UPPER ABDOMEN: Contrast in nondistended loops of bowel.   CHEST WALL AND OSSEOUS STRUCTURES: There are no suspicious osseous lesions. Multilevel degenerative changes are present. There is right axillary soft tissue edema with axillary subcutaneous emphysema consistent with recent Impella device placement. Right axillary clips in place consistent with recent Impella device placement.   Patient is status post median sternotomy.       1.  Postoperative changes consistent with previous heart transplant, Impella device placement and median sternotomy. 2. Postoperative changes with right axillary soft tissue/fluid consistent with recent Impella device placement. 3. Mild bibasilar atelectasis with small left-sided effusion.   MACRO: None   Signed by: Alton Piper 3/12/2024 10:24 AM Dictation workstation:   [id number]    Transthoracic Echo (TTE) Limited    Result Date: 3/11/2024   [facility], [address] [place] [address]                Tel [phone] and Fax [phone] TRANSTHORACIC ECHOCARDIOGRAM REPORT  Patient Name:      MIGUEL DIMAS      Reading Physician:    [id number] Patrica BASS MD Study Date:        3/11/2024            Ordering Provider:    [id number] MERLINE SHELLEY MRN/PID:           [id number]             Fellow: Accession#:        [id number]         Nurse: Date of Birth/Age: 1990 /  34 years  Sonographer:          Kingsley Morrell                                                               [facility] Gender:            F                    Additional Staff: Height:            154.94 cm            Admit Date:           2/15/2024 Weight:            97.01 kg             Admission Status:     Inpatient -                                                               Routine BSA / BMI:         1.94 m2 / 40.41      Encounter#:           [id number]                    kg/m2                                         Department Location:  [address] Blood Pressure: 96 /7 mmHg Study Type:    TRANSTHORACIC ECHO (TTE) LIMITED Diagnosis/ICD: Cardiogenic shock-R57.0 Indication:    Shock, s/p right heart cath, limited for function & MR CPT Code:      Echo Limited-85587; Echo Complete w Full Doppler-37870; Doppler                Limited-35911 Patient History: Pertinent History: 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 Study Detail: The following Echo studies were performed: 2D, M-Mode, Doppler and               color flow. Technically challenging study due to body habitus and               poor acoustic windows. Definity used as a contrast agent for               endocardial border definition. Total contrast used for this               procedure was 1.5 mL via IV push.  PHYSICIAN INTERPRETATION: Left Ventricle: The left ventricular systolic function is moderately to severely decreased, with an estimated ejection fraction of 25-30%. There is global hypokinesis of the left ventricle with minor regional variations. The left ventricular cavity size is normal. The left ventricular septal wall thickness is mildly increased. There is mildly increased left ventricular posterior wall thickness. Left ventricular diastolic filling was indeterminate. Left Atrium: The left atrium is mildly dilated. Right Ventricle: The right ventricle is normal in size. There is reduced  right ventricular systolic function. Right Atrium: The right atrium is mildly dilated. Aortic Valve: The aortic valve was not well visualized. There is indeterminate aortic valve regurgitation. Mitral Valve: The mitral valve is normal in structure. There is moderate mitral valve regurgitation. Tricuspid Valve: The tricuspid valve is structurally normal. Tricuspid regurgitation was not assessed. Pulmonic Valve: The pulmonic valve is not well visualized. Pulmonic valve regurgitation was not assessed. Pericardium: There is a trivial to small pericardial effusion. Aorta: The aortic root was not well visualized. In comparison to the previous echocardiogram(s): Compared with the prior exam from 3/4/2024 there was already an Impella LVAD in place. The LV systolic function appears similar or slightly less dynamic today. The MR appears to still be moderat. The RVSP was not assessed today.  LEFT VENTRICULAR ASSIST DEVICE: LVAD: The patient has a(n) Impella LVAD device present. LVAD Comments: There is an Impella in placve which is angled posteriorly within the LV cavity.  CONCLUSIONS:  1. Left ventricular systolic function is moderately to severely decreased with a 25-30% estimated ejection fraction.  2. There is reduced right ventricular systolic function.  3. Moderate mitral valve regurgitation.  4. Compared with the prior exam from 3/4/2024 there was already an Impella LVAD in place. The LV systolic function appears similar or slightly less dynamic today. The MR appears to still be moderat. The RVSP was not assessed today.  5. There is global hypokinesis of the left ventricle with minor regional variations. QUANTITATIVE DATA SUMMARY: 2D MEASUREMENTS:                           Normal Ranges: IVSd:          1.40 cm    (0.6-1.1cm) LVPWd:         1.30 cm    (0.6-1.1cm) LVIDd:         4.40 cm    (3.9-5.9cm) LV Mass Index: 117.0 g/m2 LA VOLUME:                               Normal Ranges: LA Vol A4C:        67.6 ml     (22+/-6mL/m2) LA Vol A2C:        83.8 ml LA Vol BP:         75.8 ml LA Vol Index A4C:  34.8ml/m2 LA Vol Index A2C:  43.1 ml/m2 LA Vol Index BP:   39.0 ml/m2 LA Area A4C:       24.1 cm2 LA Area A2C:       27.0 cm2 LA Major Axis A4C: 7.3 cm LA Major Axis A2C: 7.4 cm LA Volume Index:   39.1 ml/m2 RA VOLUME BY A/L METHOD:                       Normal Ranges: RA Area A4C: 19.0 cm2 LV DIASTOLIC FUNCTION:                        Normal Ranges: MV Peak E:    1.23 m/s (0.7-1.2 m/s) MV e'         0.11 m/s (>8.0) MV lateral e' 0.16 m/s MV medial e'  0.06 m/s E/e' Ratio:   11.18    (<8.0) MITRAL VALVE:                 Normal Ranges: MV DT: 108 msec (150-240msec)  RIGHT VENTRICLE: RV Basal 4.10 cm RV Mid   3.00 cm RV Major 7.3 cm RV s'    0.09 m/s  [id number] Patrica Aguilera MD Electronically signed on 3/11/2024 at 6:17:02 PM  ** Final **     XR chest 1 view    Result Date: 3/11/2024  Interpreted By:  Omari Gutierrez, STUDY: XR CHEST 1 VIEW;  3/11/2024 7:47 am   INDICATION: Signs/Symptoms:daily.   COMPARISON: 03/10/2024   ACCESSION NUMBER(S): [id number]   ORDERING CLINICIAN: BEULAH MCGOVERN   FINDINGS: AP radiograph of the chest was provided.   Impella device entering via the right axillary artery remains unchanged in satisfactory position. Central venous catheter entering via the right jugular vein is unchanged in position. CardioMEMS device is again visible on the left.   CARDIOMEDIASTINAL SILHOUETTE: Cardiomediastinal silhouette is stable in size and configuration.   LUNGS: Minimal basal atelectasis on the left side is again noted. The lungs show no other areas of opacity. Minimal left pleural fluid collection is seen.   ABDOMEN: No remarkable upper abdominal findings.   BONES: No acute osseous changes.       1.  Essentially stable appearance of the chest.       MACRO: None   Signed by: Omari Gutierrez 3/11/2024 12:17 PM Dictation workstation:   [id number]    Pulmonary function testing    Result Date: 3/11/2024  The FEV1/FVC is  normal. The FEV1 is mildly reduced. The FVC is moderately reduced, suggesting restrictive impairment.   However, the presence of restriction should be confirmed by measurement of lung volumes. Conclusion: Spirometry shows no obstruction, however it suggests a possible restrictive ventilatory impairment or non-specific pattern. Recommend obtaining lung volumes to confirm.    XR chest 1 view    Result Date: 3/10/2024  Interpreted By:  Alton Piper, STUDY: XR CHEST 1 VIEW; 3/10/2024 4:14 am   INDICATION: Signs/Symptoms:daily.   COMPARISON: 03/09/2024.   ACCESSION NUMBER(S): [id number]   ORDERING CLINICIAN: BEULAH MCGOVERN   FINDINGS: Right IJ line in place.   CARDIOMEDIASTINAL SILHOUETTE: Patient is status post median sternotomy. Impella device in place. CardioMEMS device overlies the left pulmonary artery.   LUNGS: No focal airspace disease. Minimal left basilar atelectasis.   ABDOMEN: No remarkable upper abdominal findings.   BONES: No acute osseous changes.       1.  Impella device in place. 2. No focal airspace disease.     Signed by: Alton Piper 3/10/2024 10:55 AM Dictation workstation:   [id number]    XR chest 1 view    Result Date: 3/9/2024  Interpreted By:  Alton Piper, STUDY: XR CHEST 1 VIEW; 3/9/2024 8:37 am   INDICATION: Signs/Symptoms:daily.   COMPARISON: 03/08/2024.   ACCESSION NUMBER(S): [id number]   ORDERING CLINICIAN: BEULAH MCGOVERN   FINDINGS:     CARDIOMEDIASTINAL SILHOUETTE: Patient is status post median sternotomy. Impella device in place. Cardiomegaly versus pericardial effusion.   LUNGS: Lungs are clear of focal airspace disease or edema. No pneumothorax.   ABDOMEN: No remarkable upper abdominal findings.   BONES: No acute osseous changes.       1.  Impella device in place. No focal airspace disease.     Signed by: Alton Piper 3/9/2024 9:32 AM Dictation workstation:   [id number]    XR chest 1 view    Result Date: 3/9/2024  Interpreted By:  Alton Piper, STUDY: XR  CHEST 1 VIEW; 3/8/2024 4:09 am   INDICATION: Signs/Symptoms:daily.   COMPARISON: 03/07/2024.   ACCESSION NUMBER(S): [id number]   ORDERING CLINICIAN: BEULAH MCGOVERN   FINDINGS:     CARDIOMEDIASTINAL SILHOUETTE: Patient is status post median sternotomy Impella in place overlying the left ventricle. Right IJ catheter overlies the SVC. CardioMEMS device overlies the left pulmonary artery.   LUNGS: Lungs are clear of focal airspace disease or edema. No pneumothorax.   ABDOMEN: No remarkable upper abdominal findings.   BONES: No acute osseous changes.       1.  Life-support systems in satisfactory position. No focal airspace disease.     Signed by: Alton Piper 3/9/2024 9:31 AM Dictation workstation:   [id number]    Vascular US Ankle Brachial Index (KATHIE) Without Exercise    Result Date: 3/8/2024            [place] [address]   Tel [phone] and Fax [phone]  Vascular Lab Report [facility] US ANKLE BRACHIAL INDEX (KATHIE) WITHOUT EXERCISE  Patient Name:      MIGUEL BASS Reading Physician:  [id number] Robert Santiago MD, RPVI Study Date:        3/8/2024              Ordering Physician: [id number] BEULAH MCGOVERN MRN/PID:           [id number]              Technologist:       Kati Wilde                                                              [facility] Accession#:        [id number]          Technologist 2: Date of Birth/Age: 1990 / 34 years   Encounter#:         [id number] Gender:            F Admission Status:  Inpatient             Location Performed: [facility]  Diagnosis/ICD: Peripheral vascular disease, unspecified-I73.9 Indication:    Transplant cardiomyopthy CPT Codes:     94637 Peripheral artery KATHIE Only  Patient History Cardiac assist device.  CONCLUSIONS:  Right Lower PVR: No evidence of arterial occlusive disease in the right lower extremity at rest. Normal digital perfusion noted. Triphasic flow is noted in the right common femoral artery, right posterior tibial artery and right dorsalis pedis artery. Left Lower PVR: No evidence of arterial occlusive disease in the left lower extremity at rest. Normal digital perfusion noted. Triphasic flow is noted in the left posterior tibial artery and left dorsalis pedis artery. Right groin image not saved due to archiving error, was triphasic.  Comparison: Compared with study from 2/3/2022, no significant change.  Imaging & Doppler Findings:  RIGHT Lower PVR                Pressures Ratios Right Posterior Tibial (Ankle) 110 mmHg  1.02 Right Dorsalis Pedis (Ankle)   108 mmHg  1.00 Right Digit (Great Toe)        105 mmHg  0.97   LEFT Lower PVR                Pressures Ratios Left Posterior Tibial (Ankle) 98 mmHg   0.91 Left Dorsalis Pedis (Ankle)   97 mmHg   0.90 Left Digit (Great Toe)        87 mmHg   0.81                     Right     Left Brachial Pressure 107 mmHg 108 mmHg   [id number] Robert Santiago MD, ALEXSANDER Electronically signed by [id number] ALEXSANDER Silverio MD on 3/8/2024 at 11:44:19 AM  ** Final **     Vascular US carotid artery duplex bilateral    Result Date: 3/7/2024            [place] [address]   Tel [phone] and Fax [phone]  Vascular Lab Report [facility] US CAROTID ARTERY DUPLEX BILATERAL  Patient Name:      MIGUEL MONTELONGO JUDIE BASS Reading Physician:  [id number] ALEXSANDER Silverio MD Study Date:        3/7/2024              Ordering Physician: [id number] MCGOVERN MRN/PID:           [id number]              Technologist:       CT Accession#:        [id number]          Technologist 2: Date of Birth/Age: 1990 / 34 years   Encounter#:          [id number] Gender:            F Admission Status:  Inpatient             Location Performed: [facility]  Diagnosis/ICD: Encounter for other preprocedural examination-Z01.818 CPT Codes:     64919 Cerebrovascular Carotid Duplex scan complete  CONCLUSIONS: Left Carotid: Findings are consistent with less than 50% stenosis of the left proximal internal carotid artery. Left external carotid artery appears patent with no evidence of stenosis. The left vertebral artery is patent with antegrade flow. No evidence of hemodynamically significant stenosis in the left subclavian artery.  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 Line on right side of neck. Unable to visualize right side vessles. Patient on cardiac device , irregular heart rhythm. Patient is on Cardiac device, LVAD? Velocity criteria for carotid stenosis might be equivocal inLVAD patients  Imaging & Doppler Findings: Left Plaque Morph: The proximal left internal carotid artery demonstrates homogenous and irregular plaque. The distal left common carotid artery demonstrates smooth and irregular plaque.  Right                 Left  PSV  EDV              PSV     EDV             CCA P    57 cm/s             CCA D    51 cm/s             ICA P    51 cm/s 27 cm/s             ICA M    60 cm/s 32 cm/s             ICA D    56 cm/s 24 cm/s              ECA     39 cm/s           Vertebral  20 cm/s 16 cm/s           Subclavian 31 cm/s               Left ICA/CCA Ratio 1.0   [id number] Robert Santiago MD, RPVI Electronically signed by [id number] Robert Santiago MD, RPVI on 3/7/2024 at 9:33:19 PM  ** Final **     Vascular US aorta iliac duplex limited    Result Date: 3/7/2024            [place] [address]   Tel [phone] and Fax [phone]  Vascular Lab Report [facility]ASC US AORTA ILIAC DUPLEX LIMITED  Patient Name:      MIGUEL Camara  Physician:  [id number] Aburto MD Study Date:        3/7/2024              Ordering Physician: [id number] BEULAH MCGOVERN MRN/PID:           [id number]              Technologist:       Chio Amaro T Accession#:        [id number]          Technologist 2: Date of Birth/Age: 1990 / 34 years   Encounter#:         [id number] Gender:            F Admission Status:  Inpatient             Location Performed: [facility]  Diagnosis/ICD: Encounter for preprocedural cardiovascular examination-Z01.810 CPT Codes:     42062 Duplex Aorta/IVC/Iliac/Bypass Graft  CONCLUSIONS: Aorta/Common Iliac Arteries/IVC: The abdominal aorta and bilateral common iliac arteries demonstrate no evidence of aneurysm. There is smooth homogeneous plaque visualized proximally within the right common iliac artery. There is no evidence of hemodynamically significant stenosis in the right common iliac artery. There is smooth homogeneous plaque within the left common iliac artery. No evidence of hemodynamically significant stenosis in the left common iliac artery.  Additional Findings: Lines, dressings, and colostomy bag on abdomen. Limited access to abdomen. Patient on cardiac device. Irregular heart rhythm.  Imaging & Doppler Findings:   AORTA     AP    Lateral    PSV Proximal 2.09 cm 2.09 cm 58.4 cm/s   Mid    1.96 cm 1.96 cm 63.6 cm/s  Distal  1.61 cm 1.66 cm 58.4 cm/s    RIGHT       AP    Lateral    PSV RUTHIE Proximal 1.11 cm 1.02 cm 48.80 cm/s     LEFT       AP    Lateral    PSV RUTHIE Proximal 1.13 cm 1.21 cm 42.70 cm/s  [id number] Aburto MD Electronically signed by [id number] Aburto MD on 3/7/2024 at 5:00:01 PM  ** Final **     US abdomen    Result Date: 3/7/2024  Interpreted By:  Ash Ricks and Stephens Katherine STUDY: US  ABDOMEN;  3/7/2024 4:09 pm   INDICATION: Signs/Symptoms:heart transplant workup.   COMPARISON: Renal ultrasound 02/19/2024   ACCESSION NUMBER(S): [id number]   ORDERING CLINICIAN: BEULAH MCGOVERN   TECHNIQUE: Multiple images of the abdomen were obtained.   FINDINGS: LIVER: The liver measures 16.1 cm and is grossly unremarkable and free of any focal lesions.   GALLBLADDER: The gallbladder is nondistended, and demonstrates sludge. No wall thickening or surrounding fluid. The gallbladder wall thickness is 0.2 cm. Sonographic Morrell's sign is negative.   BILE DUCTS: No evidence of intra or extrahepatic biliary dilatation is identified; the common bile duct measures 0.5 cm.   PANCREAS: The pancreas is poorly visualized due to overlying bowel gas.   RIGHT KIDNEY: The right kidney measures 10.3 cm in length. The renal cortical echogenicity and thickness are within normal limit.  No hydronephrosis or renal calculi are seen.   LEFT KIDNEY: The left kidney measures 10.2 cm in length. The renal cortical echogenicity and thickness are within normal limits. No hydronephrosis or renal calculi are seen.   SPLEEN: The spleen measures 7.7 cm and is grossly unremarkable.   URINARY BLADDER: The urinary bladder is within normal limits.   PERITONEUM: There is no free or loculated fluid seen in the abdomen.   ABDOMINAL AORTA AND IVC: The visualized portions of the aorta and IVC are unremarkable.       Biliary sludge without evidence of acute cholecystitis. Otherwise unremarkable abdominal ultrasound.   I personally reviewed the images/study and I agree with Charity Ross DO's (radiology resident) findings as stated. This study was interpreted at [facility], [place], [place].   MACRO: None   Signed by: Ash Ricks 3/7/2024 4:37 PM Dictation workstation:   [id number]    Electrocardiogram, 12-lead PRN ACS symptoms    Result Date: 3/7/2024  Sinus tachycardia Low voltage QRS Possible  Anteroseptal infarct Possible Lateral infarct Abnormal ECG Confirmed by Kei Lozano ([id number]) on 3/7/2024 3:01:50 PM    ECG 12 lead    Result Date: 3/7/2024   Poor data quality, interpretation may be adversely affected Sinus tachycardia with occasional Premature ventricular complexes Low voltage QRS Anterior infarct Abnormal ECG Confirmed by Kei Lozano ([id number]) on 3/7/2024 3:01:17 PM    Lower extremity venous duplex bilateral    Result Date: 3/7/2024            [place] [address]   Tel [phone] and Fax [phone]  Vascular Lab Report [facility]ASC US LOWER EXTREMITY VENOUS DUPLEX BILATERAL  Patient Name:     MIGUEL DIMAS      Reading           [id number] Carolina BASS                Physician:        MD Study Date:       3/6/2024             Ordering          [id number] ODILON RAYO                                        Physician:        GINA MRN/PID:          [id number]             Technologist:     Sue Hammond S Accession#:       [id number]         Technologist 2: Date of           1990 / 34 years  Encounter#:       [id number] Birth/Age: Gender:           F Admission Status: Inpatient            Location          [facility]                                        Performed:  Diagnosis/ICD: Generalized edema (anasarca)-R60.1 CPT Codes:     52614 Peripheral venous duplex scan for DVT complete  Patient History PE. Heart transplant patient and now on list for second heart                 transplant.                 Patient has impella                 Recent ECMO.  CONCLUSIONS: Right Lower Venous: No evidence of acute deep vein thrombus visualized in the right lower extremity. Left Lower Venous: No evidence of acute deep vein thrombus visualized in the left lower extremity. Cannot rule out thrombus in non-visualized common femoral and iliac veins due to dressings. Recent ECMO left groin. Unable to visualize left distal external iliac  vein and left CFV.  Additional Findings: Left FV- pop vein continous waveforms maybe suggestive of more proximal occlusion or compression. Technically difficult exam due impella and recent ECMO.  Imaging & Doppler Findings:  Right                 Compressible Thrombus        Flow Distal External Iliac     Yes        None   Spontaneous/Phasic CFV                       Yes        None   Spontaneous/Phasic PFV                       Yes        None FV Proximal               Yes        None   Spontaneous/Phasic FV Mid                    Yes        None FV Distal                 Yes        None Popliteal                 Yes        None   Spontaneous/Phasic Peroneal                  Yes        None PTV                       Yes        None  Left        Compress Thrombus    Flow PFV           Yes      None FV Proximal   Yes      None   Continuous FV Mid        Yes      None FV Distal     Yes      None Popliteal     Yes      None   Continuous Peroneal      Yes      None PTV           Yes      None  [id number] Carolina Benítez MD Electronically signed by [id number] Carolina Benítez MD on 3/7/2024 at 9:33:26 AM  ** Final **     XR chest 1 view    Result Date: 3/7/2024  Interpreted By:  Raman Roa, STUDY: XR CHEST 1 VIEW;  3/7/2024 3:49 am   INDICATION: Signs/Symptoms:daily.   COMPARISON: Chest radiograph dated 3/6/2024   ACCESSION NUMBER(S): [id number]   ORDERING CLINICIAN: BEULAH MCGOVERN   FINDINGS: AP radiograph of the chest   The right internal jugular catheter is in satisfactory position. The Impella device is noted unchanged in position. Sternal sutures are present. The heart is mildly enlarged. Pulmonary aeration is similar previous study.       1. No acute cardiopulmonary pathology       Signed by: Raman Roa 3/7/2024 9:27 AM Dictation workstation:   [id number]    Upper extremity venous duplex bilateral    Result Date: 3/6/2024            [place] [address]   Tel  [phone] and Fax [phone]  Vascular Lab Report VASC US UPPER EXTREMITY VENOUS DUPLEX BILATERAL  Patient Name:     MIGUEL DIMAS      Reading           [address]11 Carolina Jeyson BASS                Physician:        MD Study Date:       3/6/2024             Ordering          [id number] ODILON RAYO                                        Physician:        GINA MRN/PID:          [id number]             Technologist:     Sue Hammond S Accession#:       [id number]         Technologist 2: Date of           1990 / 34 years  Encounter#:       [id number] Birth/Age: Gender:           F Admission Status: Inpatient            Location          [facility]                                        Performed:  Diagnosis/ICD: Pain in right arm-M79.601; Pain in left arm-M79.602 Indication:    Limb pain. CPT Codes:     62887 Peripheral venous duplex scan for DVT complete  Patient History CAD and PE. Patient had heart transplant few years ago now heart                 is not working back on list foe second heart transplant.                 Recent ECMO left groin.  **CRITICAL RESULT** Critical Result: Acute DVT L IJ vein and SVT left cephalic vein and right cephalic vein Notification called to Carlee Marina on 3/6/2024 at 3:30:53 PM by Sue Hammond.  CONCLUSIONS: Right Upper Venous: There is acute occlusive superficial venous thrombosis visualized in the mid cephalic and acute occlusive superficial venous thrombosis visualized in the distal cephalic veins. Axillary vein compression images were not saved to review. The remainder of veins in right arm are negative for deep vein thrombus. Cannot rule out thrombus of non-compressible proximal subclavian vein due to Impella. Cannot rule out thrombus of non-visualized internal jugular, distal subclavian and mid subclavian veins due to line and Impella. Left Upper Venous: There is acute non-occlusive deep vein thrombosis visualized in the internal jugular  and acute non-occlusive superficial venous thrombosis visualized in the mid cephalic veins. The remainder of veins in left arm are negative for deep vein thrombus.  Additional Findings: Technically difficut exam due to impella and lines. Somehow continuos flow is noted which maybe suggestive of more proximal occlusion or compression.  Imaging & Doppler Findings:  Right               Compressible    Thrombus            Flow Subclavian                            None       Spontaneous/Phasic Subclavian Proximal                   None       Spontaneous/Phasic Subclavian Distal                     None Axillary                Yes           None       Spontaneous/Phasic Brachial                Yes           None Cephalic                 No      Acute occlusive Basilic                 Yes           None  Left                Compress      Thrombus              Flow Internal Jugular    Partial  Acute non-occlusive Spontaneous/Phasic Subclavian            Yes           None         Spontaneous/Phasic Subclavian Proximal   Yes           None         Spontaneous/Phasic Subclavian Mid        Yes           None         Spontaneous/Phasic Subclavian Distal     Yes           None         Spontaneous/Phasic Axillary              Yes           None         Spontaneous/Phasic Brachial              Yes           None Cephalic            Partial  Acute non-occlusive     Continuous Basilic               Yes           None  [id number] Carolina Benítez MD Electronically signed by [id number] Carolina Benítez MD on 3/6/2024 at 4:20:11 PM  ** Final **     XR chest 1 view    Result Date: 3/6/2024  Interpreted By:  Raman Roa, STUDY: XR CHEST 1 VIEW;  3/6/2024 3:40 am   INDICATION: Signs/Symptoms:daily.   COMPARISON: Chest radiograph dated 3/5/2024   ACCESSION NUMBER(S): [id number]   ORDERING CLINICIAN: BEULAH MCGOVERN   FINDINGS: AP radiograph of the chest   Limitations: Decreased lung volumes. Patient rotation to the left.   Right internal  jugular central venous catheter with distal tip overlying the expected location of the mid superior vena cava. Impella device overlying the left ventricle. Postsurgical changes from median sternotomy. Surgical clips project over the right axilla/upper chest.   Coarse interstitial lung markings are noted bilaterally. Ground-glass opacity within the retrocardiac left lower lobe is noted. The heart is mildly enlarged.       1. Coarse interstitial lung markings bilaterally 2. Small left pleural effusion and subsegmental atelectasis       Signed by: Raman Roa 3/6/2024 8:55 AM Dictation workstation:   [id number]    XR chest 1 view    Result Date: 3/5/2024  Interpreted By:  Raman Roa and Ogievich Taessa STUDY: XR CHEST 1 VIEW;  3/5/2024 11:28 am   INDICATION: Signs/Symptoms:Dialysis line.   COMPARISON: Chest x-ray 03/05/2021-03/01/2024 CT 02/02/2022   ACCESSION NUMBER(S): [id number]   ORDERING CLINICIAN: BEULAH MCGOVERN   FINDINGS: AP semi-erect radiograph of the chest   LINES/TUBES/DEVICES: Interval placement of right internal jugular central venous catheter with distal tip overlying the expected location of the mid superior vena cava. Impella device overlying the left ventricle. Postsurgical changes from median sternotomy. Surgical clips project over the right axilla/upper chest.   CARDIOMEDIASTINAL SILHOUETTE: The cardiomediastinal silhouette is stable in size and configuration.   LUNGS: No acute pulmonary infiltrates or consolidations are noted. The left ventricle obscures the retrocardiac left lower lobe. Vascular clips overlie the right upper thorax and axilla.   ABDOMEN: No remarkable upper abdominal findings.   BONES: No acute osseous abnormality.       1. No acute cardiopulmonary pathology similar to previous study 2. Medical devices as detailed above   I personally reviewed the images/study and I agree with the findings as stated by Heather Lopez DO, PGY-2. This study was interpreted at  [place], [place].   Signed by: Raman Roa 3/5/2024 1:46 PM Dictation workstation:   [id number]    XR chest 1 view    Result Date: 3/5/2024  Interpreted By:  Raman Roa and Ogievich Taessa STUDY: XR CHEST 1 VIEW;  3/5/2024 8:30 am   INDICATION: Signs/Symptoms:morning cxr. Per EMR: Day 19 of admission presenting with cardiogenic shock.   COMPARISON: Chest x-ray 03/04/2024-02/29/2024 CT chest 02/02/2022   ACCESSION NUMBER(S): [id number]   ORDERING CLINICIAN: BEULAH MCGOVERN   FINDINGS: AP semi-erect radiograph of the chest   LINES/TUBES/DEVICES: Postsurgical changes from median sternotomy. Surgical clips project over the right axilla. Impella device overlying the left ventricle. Right IJ introducer is noted with distal tip in the superior vena cava.   CARDIOMEDIASTINAL SILHOUETTE: The cardiomediastinal silhouette is stable in size and configuration.   LUNGS: Improvement in lung volume and aeration bilaterally. Persistent but improved interstitial opacities. Bibasilar subsegmental atelectasis. Blunting of the left costophrenic angle may be due to enlarged cardiac silhouette and/or pleural effusion. No evidence of pneumothorax.   ABDOMEN: No remarkable upper abdominal findings.   BONES: No acute osseous abnormality.       1. Improved lung volumes, aeration, and interstitial opacities bilaterally. 2. Persistent bibasilar atelectasis. 3. Possible small left pleural effusion.   I personally reviewed the images/study and I agree with the findings as stated by Heather Lopez DO, PGY-2. This study was interpreted at [place], [place].   Signed by: Raman Roa 3/5/2024 10:20 AM Dictation workstation:   [id number]    Transthoracic Echo (TTE) Limited    Result Date: 3/4/2024   [facility], [address] [place] [address]                Tel [phone] and Fax [phone] TRANSTHORACIC  ECHOCARDIOGRAM REPORT  Patient Name:      MIGUEL DIMAS      Reading Physician:    [id number] Param BASS MD Study Date:        3/4/2024             Ordering Provider:    [id number] ELENA QUINTERO MRN/PID:           [id number]             Fellow: Accession#:        [id number]         Nurse: Date of Birth/Age: 1990 / 33 years  Sonographer:          Adryan Meredith RDCS Gender:            F                    Additional Staff: Height:            154.94 cm            Admit Date:           2/15/2024 Weight:            90.27 kg             Admission Status:     Inpatient -                                                               Routine BSA / BMI:         1.89 m2 / 37.60      Encounter#:           [id number]                    kg/m2                                         Department Location:  [facility] Blood Pressure: 127 /84 mmHg Study Type:    TRANSTHORACIC ECHO (TTE) LIMITED Diagnosis/ICD: Cardiogenic shock-R57.0 Indication:    shock CPT Code:      Echo Limited-40558; Doppler Limited-81309; Color Doppler-43447 Patient History: Pertinent History: 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 Study Detail: The following Echo studies were performed: 2D, M-Mode, Doppler and               color flow. Technically challenging study due to body habitus,               patient lying in supine position, poor acoustic windows, prominent               lung artifact and postoperative dressings. Definity used as a               contrast agent for endocardial border definition. Total contrast               used for this procedure was 2.0 mL via IV push.  PHYSICIAN INTERPRETATION: Left Ventricle: The left ventricular systolic function is moderately to severely decreased, with an estimated  ejection fraction of 30-35%. There is global hypokinesis of the left ventricle with minor regional variations. The left ventricular cavity size is normal. Left ventricular diastolic filling was not assessed. Left Atrium: The left atrium is consistent with transplant. Right Ventricle: The right ventricle is mildly enlarged. There is reduced right ventricular systolic function. A device is visualized in the right ventricle. Right Atrium: The right atrium is consistent with a transplant. Aortic Valve: The aortic valve was not well visualized. There is no evidence of aortic valve regurgitation. The peak instantaneous gradient of the aortic valve is 12.2 mmHg. Mitral Valve: The mitral valve is normal in structure. There is mild mitral valve regurgitation. Tricuspid Valve: The tricuspid valve is structurally normal. There is mild tricuspid regurgitation. The Doppler estimated RVSP is mildly elevated at 35.9 mmHg. Pulmonic Valve: The pulmonic valve was not assessed. Pulmonic valve regurgitation was not assessed. Pericardium: There is a trivial to small pericardial effusion. Aorta: The aortic root is normal.  CONCLUSIONS:  1. Left ventricular systolic function is moderately to severely decreased with a 30-35% estimated ejection fraction.  2. There is reduced right ventricular systolic function.  3. Mildly elevated RVSP.  4. There is global hypokinesis of the left ventricle with minor regional variations. QUANTITATIVE DATA SUMMARY: 2D MEASUREMENTS:                           Normal Ranges: IVSd:          1.10 cm    (0.6-1.1cm) LVPWd:         1.20 cm    (0.6-1.1cm) LVIDd:         4.60 cm    (3.9-5.9cm) LVIDs:         3.90 cm LV Mass Index: 102.3 g/m2 LV % FS        15.2 % LV SYSTOLIC FUNCTION BY 2D PLANIMETRY (MOD):                     Normal Ranges: EF-A4C View: 25.7 % (>=55%) EF-A2C View: 38.1 % EF-Biplane:  33.6 % AORTIC VALVE:                        Normal Ranges: AoV Vmax:    1.75 m/s  (<=1.7m/s) AoV Peak P.2 mmHg  (<20mmHg)  RIGHT VENTRICLE: RV s' 0.08 m/s TRICUSPID VALVE/RVSP:                             Normal Ranges: Peak TR Velocity: 2.87 m/s RV Syst Pressure: 35.9 mmHg (< 30mmHg) IVC Diam:         2.00 cm  [id number] Param Doan MD Electronically signed on 3/4/2024 at 1:38:45 PM  ** Final **     XR chest 1 view    Result Date: 3/4/2024  Interpreted By:  Raman Roa, STUDY: XR CHEST 1 VIEW;  3/4/2024 3:32 am   INDICATION: Signs/Symptoms:CTICU morning CXR.   COMPARISON: Chest radiograph dated 3/3/2024   ACCESSION NUMBER(S): [id number]   ORDERING CLINICIAN: ENRRIQUE CRISTINA   FINDINGS: AP radiograph of the chest   Decreased lung volumes are noted. The patient is rotated to the left. Sternal sutures are noted. An Impella device is in adequate position. The heart is mildly enlarged. Interstitial opacities are demonstrated bilaterally subsegmental atelectasis is suggested within the retrocardiac left lower lobe.       1. Mild interstitial opacities bilaterally accentuated by decreased lung volumes. The pulmonary vascular distribution is similar previous study. 2. Possible subsegmental atelectasis and left pleural effusion obscured by the left ventricle.       Signed by: Raman Roa 3/4/2024 8:49 AM Dictation workstation:   [id number]    XR chest 1 view    Result Date: 3/3/2024  Interpreted By:  Shayne Waller, STUDY: XR CHEST 1 VIEW;  3/3/2024 3:44 am   INDICATION: Signs/Symptoms:cardiogenic shock on MCS.   COMPARISON: 03/02/2024.   ACCESSION NUMBER(S): [id number]   ORDERING CLINICIAN: KIM MEHTA       1.  Removal of the ETT, left internal jugular catheter, right internal jugular  [place]-Estevan catheter. Stable position of the Impella device. 2. Cardiac silhouette is mildly enlarged. 3. Pulmonary vessels are congested with mild pulmonary edema/fluid overload., slightly improved aeration since last exam. 4. Small left-sided pleural effusion. 5. No pneumothorax seen.       MACRO: None   Signed by: Shayne Waller 3/3/2024 12:54 PM  Dictation workstation:   [id number]    XR abdomen 1 view    Result Date: 3/2/2024  Interpreted By:  Shayne Waller and Liller Gregory STUDY: XR ABDOMEN 1 VIEW;  3/2/2024 7:42 am   INDICATION: Signs/Symptoms:OG.   COMPARISON: 02/24/2024   ACCESSION NUMBER(S): [id number]   ORDERING CLINICIAN: KIM MEHTA   FINDINGS: Enteric tube projects expected location of the gastric body.   Nonobstructive bowel gas pattern. No evidence to suggest pneumoperitoneum.   Visualized lungs are clear.   No acute osseous injury.       Nonobstructive bowel gas pattern. Enteric tube projects over the expected location of the gastric body.   I personally reviewed the images/study and I agree with the findings as stated above by resident physician, Dr. Sloan Ybarra. The study was interpreted at [facility] in [facility].   MACRO: none   Signed by: Shayne Waller 3/2/2024 11:48 AM Dictation workstation:   [id number]    XR chest 1 view    Result Date: 3/2/2024  Interpreted By:  Shayne Waller, STUDY: XR CHEST 1 VIEW;  3/2/2024 3:18 am   INDICATION: Signs/Symptoms:cardiogenic shock on MCS.   COMPARISON: 03/01/2024.   ACCESSION NUMBER(S): [id number]   ORDERING CLINICIAN: LYN SELBY       1.  ETT 1.9 cm above remedios. Stable position of the Impella device. [place]-Estevan catheter with the tip overlies the pulmonic trunk. 2. Stable postoperative changes. 3. Low lung volumes with bronchovascular crowding. 4. Cardiac silhouette is mildly enlarged. 5. Pulmonary vessels are congested with mild pulmonary edema. 6. Small left-sided pleural effusion. 7. No pneumothorax seen.       MACRO: None   Signed by: Shayne Waller 3/2/2024 11:48 AM Dictation workstation:   [id number]    XR chest 1 view    Result Date: 3/2/2024  Interpreted By:  Shayne Waller and Liller Gregory STUDY: XR CHEST 1 VIEW;  3/1/2024 11:14 pm   INDICATION: Signs/Symptoms:s/p impella.   COMPARISON: Same day radiograph of the chest 3:25 a.m.    ACCESSION NUMBER(S): [id number]   ORDERING CLINICIAN: ENRRIQUE CRISTINA   FINDINGS: AP radiograph of the chest was provided.   Left internal jugular central venous catheter tip projects over the expected location of the left brachiocephalic vein. Postsurgical changes consistent with median sternotomy are seen. Endotracheal tube terminates 3.2 cm from the remedios. Impella device overlies the expected location of the left ventricle. Pulmonary artery pressure monitoring device is in place. Surgical clips overlie the right axillary region.   CARDIOMEDIASTINAL SILHOUETTE: Cardiomediastinal silhouette is stable in size and configuration.   LUNGS: Low lung volumes contributing to bronchovascular crowding. May be trace left effusion. No pneumothorax.   ABDOMEN: No remarkable upper abdominal findings.   BONES: No osseous injury.       1. Trace left effusion with associated adjacent atelectasis/consolidation superimposed on bronchovascular crowding. 2. Medical devices described above.   I personally reviewed the images/study and I agree with the findings as stated above by resident physician, Dr. Sloan Ybarra. The study was interpreted at [facility] in [facility].   MACRO: none.   Signed by: Shayne Waller 3/2/2024 8:52 AM Dictation workstation:   [id number]    FL fluoro images no charge    Result Date: 3/1/2024  These images are not reportable by radiology and will not be interpreted by  Radiologists.    Anesthesia Intraoperative Transesophageal Echocardiogram    Result Date: 3/1/2024  [facility] - Dept of Anesthesiology, [address] [place] [address]                     Tel [phone] and Fax [phone] TRANSESOPHAGEAL ECHOCARDIOGRAM REPORT  Patient Name:     MIGUEL DIMAS      Reading          [id number] Harpreet BASS                Physician:       MD Study Date:       3/1/2024             Ordering         [id number] HARPREET ZUNIGA                                         Provider:        ARTHUR MRN/PID:          [id number]             Fellow: Accession#:       [id number]         Nurse: Date of           1990 / 33 years  Sonographer: Birth/Age: Gender:           F                    Additional                                        Staff: BSA / BMI:        m2 / kg/m2           Encounter#:      [id number] Study Type:    ANESTHESIA INTRAOPERATIVE BOB Diagnosis/ICD: Left ventricular failure-I50.1 PHYSICIAN INTERPRETATION: Left Ventricle: The left ventricular systolic function is moderately to severely decreased, with an estimated ejection fraction of 30%. The left ventricular cavity size is normal. Left ventricular diastolic filling was not assessed. Left Atrium: The left atrium is enlarged. There is no evidence of a patent foramen ovale. The left atrial appendage is enlarged and there is no thrombus visualized in the left atrial appendage. Right Ventricle: The right ventricle is mildly enlarged. There is mildly reduced right ventricular systolic function. Right Atrium: The right atrium enlarged. Aortic Valve: The aortic valve is trileaflet. There is no evidence of aortic valve stenosis. There is no evidence of aortic valve regurgitation. Mitral Valve: The mitral valve is normal in structure. There is no evidence of mitral valve stenosis. There is moderate mitral valve regurgitation which is centrally directed. Tricuspid Valve: The tricuspid valve is structurally normal. There is mild tricuspid regurgitation. Pulmonic Valve: The pulmonic valve is structurally normal. There is mild pulmonic valve regurgitation. Pericardium: There is a small pericardial effusion. Aorta: The aortic root is normal. There is no evidence of aortic dissection. The ascending Ao diameter is 3.0 cm. Additional Comments: Study performed on inotropic support. Surgeon aware of all findings. In comparison to the previous echocardiogram(s): Not performed on intraoperative study.  CONCLUSIONS:   1. Left ventricular systolic function is moderately to severely decreased with a 30% estimated ejection fraction.  2. There is mildly reduced right ventricular systolic function.  3. The left atrium is enlarged.  4. Moderate mitral valve regurgitation.  5. Aortic valve stenosis is not present. POST CARDIOPULMONARY BYPASS REPORT: Successful placement of Impella 5.5 device into LV. Surgeon aware of all findings.  QUANTITATIVE DATA SUMMARY: MITRAL INSUFFICIENCY:                           Normal Ranges: PISA Radius:  0.7 cm MR Alias Claude: 40.4 cm/s MR Flow Rt:   127.96 ml/s  [id number] Arian Ha MD Electronically signed on 3/1/2024 at 8:38:48 PM  ** Final **     Point of Care Ultrasound    Result Date: 3/1/2024  Demetrio Murrell MD     3/1/2024  6:34 PM Point-of-care ultrasound ordered for patient's history of heart failure with rising tachycardia rising creatinine.  Apical four-chamber parasternal long axis and parasternal short axis transthoracic views were obtained.  LV function moderately reduced with EF 30 to 40%.  Right ventricle mildly enlarged but shows okay systolic function.  Trivial pericardial effusion.  IVC with minimal respiratory variation and 2 cm.  Hepatic vein Doppler ultrasound with SD reversal, portal waveform with undulation both consistent with venous congestion. Point-of-care cardiac echo shows cardiac activity is present, trivial pericardial effusion and reduced ejection fraction. Venous access ultrasound shows evidence of moderate to severe venous congestion    Transthoracic Echo (TTE) Limited    Result Date: 3/1/2024   [facility], [address] [place] [address]                Tel [phone] and Fax [phone] TRANSTHORACIC ECHOCARDIOGRAM REPORT  Patient Name:      MIGUEL DIMAS      Reading Physician:    [id number] Arian BASS MD Study Date:        3/1/2024             Ordering Provider:    [id number] ELENA HUFFMAN                                                                LEON MRN/PID:           [id number]             Fellow: Accession#:        [id number]         Nurse: Date of Birth/Age: 1990 / 33 years  Sonographer:          Adryan Meredith RDCS Gender:            F                    Additional Staff: Height:            154.94 cm            Admit Date:           2/15/2024 Weight:            91.17 kg             Admission Status:     Inpatient - STAT BSA / BMI:         1.89 m2 / 37.98      Encounter#:           [id number]                    kg/m2                                         Department Location:  [facility] Blood Pressure: 111 /62 mmHg Study Type:    TRANSTHORACIC ECHO (TTE) LIMITED Diagnosis/ICD: Cardiogenic shock-R57.0; Heart transplant status-Z94.1 Indication:    shock, s/p heart tx CPT Code:      Echo Limited-18836 Patient History: Pertinent History: ECMO decannulation 2.29.24, 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 Study Detail: The following Echo studies were performed: 2D. Technically               challenging study due to patient lying in supine position. The               patient is on a balloon pump.  PHYSICIAN INTERPRETATION: Left Ventricle: The left ventricular systolic function is moderately decreased, with an estimated ejection fraction of 30-35%. There is global hypokinesis of the left ventricle with minor regional variations. The left ventricular cavity size is normal. The left ventricular septal wall thickness is mildly increased. There is mild concentric left ventricular hypertrophy. Left ventricular diastolic filling was not assessed. Left Atrium: The left atrium is suggestive of transplant. Right Ventricle: The right ventricle is mildly enlarged. There is reduced right ventricular systolic function. A device is visualized in the right ventricle. Right Atrium: The right atrium is  suggestive of a transplant. Aortic Valve: The aortic valve is trileaflet. Aortic valve regurgitation was not assessed. Mitral Valve: The mitral valve is normal in structure. Mitral valve regurgitation was not assessed. Tricuspid Valve: The tricuspid valve is structurally normal. Tricuspid regurgitation was not assessed. Pulmonic Valve: The pulmonic valve is not well visualized. Pulmonic valve regurgitation was not assessed. Pericardium: There is a trivial pericardial effusion. Aorta: The aortic root is normal. There is an IABP noted in the descending aorta. Systemic Veins: The inferior vena cava appears mildly dilated. There is less than 50% IVC collapse with inspiration. In comparison to the previous echocardiogram(s): Compared with study from 2/29/2024, LVEF decreased from 44% to 30-35% on current study.  CONCLUSIONS:  1. Left ventricular systolic function is moderately decreased with a 30-35% estimated ejection fraction.  2. There is reduced right ventricular systolic function.  3. There is an IABP noted in the descending aorta.  4. There is global hypokinesis of the left ventricle with minor regional variations. QUANTITATIVE DATA SUMMARY: 2D MEASUREMENTS:                           Normal Ranges: IVSd:          1.20 cm    (0.6-1.1cm) LVPWd:         1.10 cm    (0.6-1.1cm) LVIDd:         4.60 cm    (3.9-5.9cm) LVIDs:         3.80 cm LV Mass Index: 101.9 g/m2 LV % FS        17.4 % TRICUSPID VALVE/RVSP:                   Normal Ranges: IVC Diam: 2.30 cm  [id number] Arian Abbasi MD Electronically signed on 3/1/2024 at 5:26:05 PM  ** Final **     XR chest 1 view    Result Date: 3/1/2024  Interpreted By:  Raman Roa, STUDY: XR CHEST 1 VIEW;  3/1/2024 4:11 am   INDICATION: Signs/Symptoms:cardiogenic shock on MCS.   COMPARISON: Chest radiograph dated 2/29/2024   ACCESSION NUMBER(S): [id number]   ORDERING CLINICIAN: LYN SELBY   FINDINGS: AP radiograph of the chest   The [place] Estevan catheter is positioned within the  region of the left main pulmonary artery. The left internal jugular catheter is positioned within the superior vena cava near the origin of the brachiocephalic vein. The balloon pump radiopaque marker is positioned at the level of the aortic arch.. The ECMO device is been removed.   The heart is maximum normal in size. The retrocardiac left lower lobe is obscured by the left ventricle. Otherwise the lungs are well aerated.       1. No acute cardiopulmonary pathology 2. Likely subsegmental atelectasis within the retrocardiac left lower lobe       Signed by: Raman Roa 3/1/2024 9:49 AM Dictation workstation:   [id number]    Transthoracic Echo (TTE) Limited    Result Date: 2/29/2024   [facility], [address] [place] [address]                Tel [phone] and Fax [phone] TRANSTHORACIC ECHOCARDIOGRAM REPORT  Patient Name:      MIGUEL MONTELONGO JUDIE      Reading Physician:    [id number] Francis Rodríguez MD Study Date:        2/29/2024            Ordering Provider:    [id number] LYN SELBY MRN/PID:           [id number]             Fellow: Accession#:        [id number]         Nurse: Date of Birth/Age: 1990 / 33 years  Sonographer:          Adryan Meredith RDCS Gender:            F                    Additional Staff: Height:            152.40 cm            Admit Date:           2/15/2024 Weight:            91.17 kg             Admission Status:     Inpatient -                                                               [facility]                                                               threatening BSA / BMI:         1.87 m2 / 39.26      Encounter#:           [id number]                    kg/m2                                         Department Location:  [facility] Blood Pressure: 103 /48 mmHg  Study Type:    TRANSTHORACIC ECHO (TTE) LIMITED Diagnosis/ICD: Cardiogenic shock-R57.0 Indication:    s/p ECMo decunnulation to ensure pt able to be maintained off                ECMO CPT Code:      Echo Limited-30028; Doppler Limited-52575; Color Doppler-22617 Patient History: Pertinent History: ECMO decannulation , 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 Study Detail: The following Echo studies were performed: 2D, M-Mode, Doppler and               color flow. Technically challenging study due to patient lying in               supine position. Definity used as a contrast agent for endocardial               border definition. Total contrast used for this procedure was 2.0               mL via IV push.  PHYSICIAN INTERPRETATION: Left Ventricle: The left ventricular systolic function is mildly decreased, with an estimated ejection fraction of 44%. There is global hypokinesis of the left ventricle with minor regional variations. The left ventricular cavity size is normal. The left ventricular septal wall thickness is mildly increased. There is mildly increased left ventricular posterior wall thickness. Left ventricular diastolic filling was not assessed. Patient persisted with tachycardia at 136 bpm during the study. Left Atrium: The left atrium is consistent with transplant. Right Ventricle: The right ventricle is normal in size. There is reduced right ventricular systolic function. A device is visualized in the right ventricle. Right Atrium: The right atrium is normal in size. There is a device visualized in the right atrium. Aortic Valve: The aortic valve is trileaflet. There is trace to mild aortic valve regurgitation. Mitral Valve: The mitral valve is normal in structure. There is mild to moderate mitral valve regurgitation. Tricuspid Valve: The tricuspid valve is abnormal. There is mild to moderate tricuspid regurgitation. The Doppler estimated RVSP is mildly elevated at 35.3 mmHg. Pulmonic Valve: The pulmonic  valve was not assessed. Pulmonic valve regurgitation was not assessed. Pericardium: There is a trivial to small pericardial effusion. Aorta: The aortic root was not assessed. In comparison to the previous echocardiogram(s): Compared with study from 2/28/2024, there is a mild increase in LVEF from 30% to 44%. There is still RV systolic dysfunction.  CONCLUSIONS:  1. Left ventricular systolic function is mildly decreased with a 44% estimated ejection fraction.  2. There is global hypokinesis of the left ventricle with minor regional variations.  3. There is reduced right ventricular systolic function.  4. Mild to moderate mitral valve regurgitation.  5. Mild to moderate tricuspid regurgitation visualized.  6. Mildly elevated RVSP.  7. Patient persisted with tachycardia at 136 bpm during the study.  8. Compared with study from 2/28/2024, there is a mild increase in LVEF from 30% to 44%. There is still RV systolic dysfunction. QUANTITATIVE DATA SUMMARY: 2D MEASUREMENTS:                          Normal Ranges: IVSd:          1.10 cm   (0.6-1.1cm) LVPWd:         1.10 cm   (0.6-1.1cm) LVIDd:         4.40 cm   (3.9-5.9cm) LVIDs:         3.60 cm LV Mass Index: 90.3 g/m2 LV % FS        18.2 % LV SYSTOLIC FUNCTION BY 2D PLANIMETRY (MOD):                     Normal Ranges: EF-A4C View: 43.8 % (>=55%) EF-A2C View: 43.6 % EF-Biplane:  52.1 % MITRAL INSUFFICIENCY:                           Normal Ranges: PISA Radius:  0.3 cm MR VTI:       84.90 cm MR Vmax:      424.00 cm/s MR Alias Claude: 38.5 cm/s MR Volume:    4.36 ml MR Flow Rt:   21.77 ml/s MR EROA:      0.05 cm2  RIGHT VENTRICLE: RV s' 0.09 m/s TRICUSPID VALVE/RVSP:                             Normal Ranges: Peak TR Velocity: 2.84 m/s Est. RA Pressure: 3 mmHg RV Syst Pressure: 35.3 mmHg (< 30mmHg)  [id number] Francis Rodríguez MD Electronically signed on 2/29/2024 at 4:29:31 PM  ** Final **     XR chest 1 view    Result Date: 2/29/2024  Interpreted By:  Raman Roa, STUDY: XR CHEST  1 VIEW;  2/29/2024 3:48 am   INDICATION: Signs/Symptoms:icu.   COMPARISON: Chest radiograph dated 2/28/2024   ACCESSION NUMBER(S): [id number]   ORDERING CLINICIAN: SHELLI ESPITIA   FINDINGS: AP radiograph of the chest     The right internal jugular catheter and left internal jugular catheter appear in satisfactory position. Sternal sutures present. The balloon pump radiopaque marker is less than 2 cm below the level of the aortic arch. The ECMO device is in satisfactory position.   The heart is normal in size. No acute pulmonary infiltrates are seen. The left ventricle obscures the retrocardiac left lower lobe.       1. No acute cardiopulmonary pathology       Signed by: Raman Roa 2/29/2024 12:46 PM Dictation workstation:   [id number]    XR chest 1 view    Result Date: 2/29/2024  Interpreted By:  Raman Roa, STUDY: XR CHEST 1 VIEW;  2/28/2024 4:17 am   INDICATION: Signs/Symptoms:icu.   COMPARISON: Chest radiograph dated 02/27/2024   ACCESSION NUMBER(S): [id number]   ORDERING CLINICIAN: SHELLI ESPITIA   FINDINGS: AP radiograph of the chest     The right internal jugular swans Estevan catheter is positioned within the right pulmonary artery. The left internal jugular catheter is positioned within the superior vena cava near the origin of the brachiocephalic vein. Sternal sutures are intact. The balloon pump radiopaque marker is in a similar position to previous study. The ECMO device is noted.   The heart is normal in size pulmonary aeration is similar previous study.       1. Status quo       Signed by: Raman Roa 2/29/2024 8:40 AM Dictation workstation:   [id number]    Transthoracic Echo (TTE) Limited    Result Date: 2/28/2024   [facility], [address] [place] [address]                Tel [phone] and Fax [phone] TRANSTHORACIC ECHOCARDIOGRAM REPORT  Patient Name:      MIGUEL DIMAS      Reading Physician:    [id number] Kei BASS                                       MD Study Date:        2/28/2024            Ordering Provider:    [id number] LYN SELBY MRN/PID:           [id number]             Fellow:               [id number] Mg Malik MD Accession#:        [id number]         Nurse: Date of Birth/Age: 1990 / 33 years  Sonographer:          Bernadette Madsen                                                               RDCS Gender:            F                    Additional Staff: Height:            152.40 cm            Admit Date:           2/15/2024 Weight:            91.17 kg             Admission Status:     Inpatient - STAT BSA / BMI:         1.87 m2 / 39.26      Encounter#:           [id number]                    kg/m2                                         Department Location:  [facility] Blood Pressure: 103 /61 mmHg Study Type:    TRANSTHORACIC ECHO (TTE) LIMITED Diagnosis/ICD: Cardiogenic shock-R57.0 Indication:    ECMO turn down CPT Code:      Echo Limited-98412 Patient History: Pertinent History: OHT (3/2022), [facility] ECMO. Study Detail: The following Echo studies were performed: 2D. Definity used as a               contrast agent for endocardial border definition. Total contrast               used for this procedure was 2.0 mL via IV push.  PHYSICIAN INTERPRETATION: Left Ventricle: The left ventricular systolic function is moderately to severely decreased, with an estimated ejection fraction of 30%. There is global hypokinesis of the left ventricle with minor regional variations. The left ventricular cavity size is normal. There is concentric left ventricular hypertrophy. Left ventricular diastolic filling was not assessed. Left Atrium: The left atrium is consistent with transplant. Right Ventricle: The right ventricle is normal in size. There is reduced right ventricular systolic function. Right Atrium: The right atrium is  normal in size. Aortic Valve: The aortic valve is probably trileaflet. Aortic valve regurgitation was not assessed. Mitral Valve: The mitral valve is normal in structure. Mitral valve regurgitation was not assessed. Tricuspid Valve: The tricuspid valve is structurally normal. Tricuspid regurgitation was not assessed. Pulmonic Valve: The pulmonic valve is not well visualized. Pulmonic valve regurgitation was not assessed. Pericardium: There is a trivial to small pericardial effusion. Aorta: The aortic root is normal. In comparison to the previous echocardiogram(s): Compared with study from 2/27/2024, no significant change.  CONCLUSIONS:  1. Left ventricular systolic function is moderately to severely decreased with a 30% estimated ejection fraction.  2. There is global hypokinesis of the left ventricle with minor regional variations.  3. There is reduced right ventricular systolic function.  4. EF appears to have improved on turn down. QUANTITATIVE DATA SUMMARY: LA VOLUME:                              Normal Ranges: LA Vol A4C:        65.3 ml   (22+/-6mL/m2) LA Vol Index A4C:  34.9ml/m2 LA Area A4C:       22.0 cm2 LA Major Axis A4C: 6.3 cm  [id number] Kei Lozano MD Electronically signed on 2/28/2024 at 9:26:56 AM  ** Final **     Vascular US lower extremity arterial duplex left    Result Date: 2/27/2024            [place] [address]   Tel [phone] and Fax [phone]  Vascular Lab Report VASC US LOWER EXTREMITY ARTERIAL DUPLEX LEFT  Patient Name:     MIGUEL DIMAS      Reading           [id number] Rhianna BASS                Physician: Study Date:       2/23/2024            Ordering          [id number] ELENA QUINTERO                                        Physician: MRN/PID:          [id number]             Technologist:     Diandra Carcamo RVT,                                                          [facility] Accession#:       [id number]          Technologist 2: Date of           1990 / 33 years  Encounter#:       [id number] Birth/Age: Gender:           F Admission Status: Inpatient            Location          [facility]                                        Performed:  Diagnosis/ICD: Peripheral vascular disease, unspecified-I73.9 CPT Codes:     91628 Peripheral artery Lower arterial Duplex limited  CONCLUSIONS: Left Lower Arterial: Limited exam due to ECMO line. The distal superficial femoral, popliteal, peroneal, posterior tibial, anterior tibial, and dorsalis pedis arteries are patent with low monophasic flow.  Imaging & Doppler Findings:  Right                    Left  PSV                      PSV          SFA Distal     20 cm/s           Popliteal     13 cm/s          ANGEL Distal     9 cm/s       Peroneal Proximal 16 cm/s          PTA Distal     8 cm/s  [id number] Rhianna Dumont MD Electronically signed by [id number] Rhianna Dumont MD on 2/27/2024 at 2:43:35 PM  ** Final **     Transthoracic Echo (TTE) Limited    Result Date: 2/27/2024   [facility], [address] [place] [address]                Tel [phone] and Fax [phone] TRANSTHORACIC ECHOCARDIOGRAM REPORT  Patient Name:      MIGUEL MONTELONGO JUDIE      Reading Physician:    [id number] Kei BASS MD Study Date:        2/27/2024            Ordering Provider:    [id number] JILL WALTERS MRN/PID:           [id number]             Fellow: Accession#:        [id number]         Nurse: Date of Birth/Age: 1990 / 33 years  Sonographer:          Bernadette Madsen RDCS Gender:            F                    Additional Staff: Height:            152.40 cm            Admit Date:           2/15/2024 Weight:            92.53 kg             Admission Status:     Inpatient - STAT BSA / BMI:         1.88 m2 /  39.84      Encounter#:           [id number]                    kg/m2                                         Department Location:  [facility] Blood Pressure: 124 /64 mmHg Study Type:    TRANSTHORACIC ECHO (TTE) LIMITED Diagnosis/ICD: Cardiogenic shock-R57.0 Indication:    ECMO turn down trial CPT Code:      Echo Limited-50598 Patient History: Pertinent History: IABP, [facility]-ECMO, 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 Study Detail: The following Echo studies were performed: 2D.  PHYSICIAN INTERPRETATION: Left Ventricle: The left ventricular systolic function is moderately to severely decreased, with an estimated ejection fraction of 30%. There is global hypokinesis of the left ventricle with minor regional variations. The left ventricular cavity size is normal. There is moderate concentric left ventricular hypertrophy. Abnormal (paradoxical) septal motion consistent with post-operative status. Left ventricular diastolic filling was not assessed. Left Atrium: The left atrium is enlarged. Right Ventricle: The right ventricle is normal in size. There is moderately reduced right ventricular systolic function. Right Atrium: The right atrium is normal in size. Aortic Valve: The aortic valve was not well visualized. Aortic valve regurgitation was not assessed. Mitral Valve: The mitral valve is normal in structure. Mitral valve regurgitation was not assessed. Tricuspid Valve: The tricuspid valve is structurally normal. Tricuspid regurgitation was not assessed. Pulmonic Valve: The pulmonic valve is structurally normal. Pulmonic valve regurgitation was not assessed. Pericardium: There is a trivial to small pericardial effusion. Aorta: The aortic root was not assessed.  CONCLUSIONS:  1. Left ventricular systolic function is moderately to severely decreased with a 30% estimated ejection fraction.  2. There is global hypokinesis of the left ventricle with minor regional variations.  3. Abnormal septal motion consistent with post-operative status.   4. There is moderate concentric left ventricular hypertrophy.  5. There is moderately reduced right ventricular systolic function. QUANTITATIVE DATA SUMMARY:  [id number] Kei Lozano MD Electronically signed on 2/27/2024 at 11:08:39 AM  ** Final **     XR chest 1 view    Result Date: 2/27/2024  Interpreted By:  Raman Roa, STUDY: XR CHEST 1 VIEW;  2/27/2024 4:02 am   INDICATION: Signs/Symptoms:icu.   COMPARISON: Chest radiograph dated 2/26/2024   ACCESSION NUMBER(S): [id number]   ORDERING CLINICIAN: SHELLI ESPITIA   FINDINGS: AP radiograph of the chest   The right internal jugular swans Estevan catheter is positioned within the right pulmonary artery. The left internal jugular catheter is positioned within the superior vena cava near the origin of the brachiocephalic vein. Sternal sutures are intact. Balloon pump radiopaque marker appears to have been advanced since the prior study. The tip of the fiduciary marker is closer to the aortic arch. ECMO device is noted.   The heart is normal in size. The retrocardiac left lower lobe left diaphragm and costophrenic sulcus are obscured by the left ventricle. No acute pulmonary infiltrates are seen. There is improved pulmonary aeration within the right lower lobe and decreased discoid atelectasis.       1. Improved pulmonary aeration and improved pulmonary edema in comparison to the prior study       Signed by: Raman Roa 2/27/2024 10:39 AM Dictation workstation:   [id number]    XR chest 1 view    Result Date: 2/27/2024  Interpreted By:  Raman Roa, STUDY: XR CHEST 1 VIEW;  2/26/2024 3:45 am   INDICATION: Signs/Symptoms:Post op cardiac surgery.   COMPARISON: Chest radiograph dated 02/24/2024   ACCESSION NUMBER(S): [id number]   ORDERING CLINICIAN: LUBNA RIBERA   FINDINGS: AP radiograph of the chest Limitations: Decreased lung volumes.   The swans Estevan catheter is positioned within the right pulmonary artery. The ECMO cannula is in satisfactory position. Balloon pump  radiopaque marker is overlying the mid descending thoracic aorta proximally 5 cm from the aortic arch. The left internal jugular catheter is positioned within superior vena cava near the origin of the brachiocephalic vein.   The heart is mildly enlarged.   Interstitial pulmonary edema is improved compared to previous study. There is mild elevation of the right diaphragm. Subsegmental atelectasis above the right diaphragm is noted. Blunting of the left costophrenic sulcus noted.       1. Improved interstitial pulmonary edema in particular within the right lung in comparison to the previous study. 2. Subsegmental atelectasis above the mildly elevated right diaphragm 3. Small left pleural effusion 4. The heart appears enlarged probably magnified by the projection       Signed by: Raman Roa 2/27/2024 8:09 AM Dictation workstation:   [id number]    Transthoracic Echo (TTE) Limited    Result Date: 2/26/2024   [facility], [address] [place] [address]                Tel [phone] and Fax [phone] TRANSTHORACIC ECHOCARDIOGRAM REPORT  Patient Name:     MIGUEL DIMAS      Reading           [id number] porter BSAS                Physician:        MD Study Date:       2/25/2024            Ordering          [id number] JILL WALTERS                                        Provider: MRN/PID:          [id number]             Fellow: Accession#:       [id number]         Nurse: Date of           1990 / 33 years  Sonographer:      Tomas Moran Birth/Age: Gender:           F                    Additional Staff: Weight:                                Admission Status: Inpatient - Routine BSA / BMI:        m2 / kg/m2           Encounter#:       [id number] Study Type:    TRANSTHORACIC ECHO (TTE) LIMITED Diagnosis/ICD: Cardiogenic shock-R57.0  Study Detail: The following Echo studies were performed: 2D.  PHYSICIAN INTERPRETATION: Left Ventricle: The left ventricular systolic  function is severely decreased. There is global hypokinesis of the left ventricle with minor regional variations. The left ventricular cavity size is mild to moderately dilated. Abnormal (paradoxical) septal motion consistent with post-operative status. Left ventricular diastolic filling was not assessed. Left Atrium: The left atrium is consistent with transplant. Right Ventricle: The right ventricle is mildly enlarged. There is reduced right ventricular systolic function. A device is visualized in the right ventricle. Right Atrium: The right atrium is consistent with a transplant. Aortic Valve: The aortic valve appears structurally normal. Aortic valve regurgitation was not assessed. Mitral Valve: The mitral valve is normal in structure. Mitral valve regurgitation was not assessed. Tricuspid Valve: The tricuspid valve is structurally normal. Tricuspid regurgitation was not assessed. Pulmonic Valve: The pulmonic valve was not assessed. Pulmonic valve regurgitation was not assessed. Pericardium: There is a small pericardial effusion. Aorta: The aortic root is normal. In comparison to the previous echocardiogram(s): Compared with study from 2/23/2024, may be a very small imporvement in LV systolic function but overall still severely depressed.  CONCLUSIONS:  1. Left ventricular systolic function is severely decreased.  2. Abnormal septal motion consistent with post-operative status.  3. There is reduced right ventricular systolic function.  4. Compared with study from 2/23/2024, may be a very small imporvement in LV systolic function but overall still severely depressed.  5. There is global hypokinesis of the left ventricle with minor regional variations. QUANTITATIVE DATA SUMMARY:  [id number] Abel Gurrola MD Electronically signed on 2/26/2024 at 6:00:42 PM ** Final **     XR chest 1 view    Result Date: 2/25/2024  Interpreted By:  Angelina Cagle, STUDY: XR CHEST 1 VIEW;  2/25/2024 3:43 am   INDICATION:  Signs/Symptoms:CTICU.   COMPARISON: Radiograph dated 02/24/2024, 5:03 p.m.   ACCESSION NUMBER(S): [id number]   ORDERING CLINICIAN: NICOLE NDIAYE   FINDINGS: Enteric tube is in place with the tip outside the field of view. Right IJ [place]-Estevan catheter is in place with the tip projecting over the right main pulmonary artery. Left IJ central venous catheter is projecting over the left brachiocephalic vein. Intra-aortic balloon pump radiopaque marker is projecting over the proximal descending thoracic aorta. Pulmonary artery pressure monitoring device is projecting over the left lung hilum. Again seen ECMO cannula projecting over lower SVC.   Status post median sternotomy. The cardiac silhouette size is unchanged.   Again seen mild interstitial pulmonary edema. No focal infiltrate or pneumothorax. Question trace left pleural effusion.   No acute osseous change.       1. Unchanged mild interstitial pulmonary edema. Cannot exclude trace left pleural effusion. 2. Medical devices and postsurgical changes as described above       Signed by: Angelina Narayan 2/25/2024 7:46 AM Dictation workstation:   [id number]    XR chest 1 view    Result Date: 2/25/2024  Interpreted By:  Angelina Cagle, STUDY: XR CHEST 1 VIEW;  2/24/2024 5:10 pm   INDICATION: Signs/Symptoms:IABP repositioned.   COMPARISON: Radiograph dated 02/24/2024   ACCESSION NUMBER(S): [id number]   ORDERING CLINICIAN: JILL WALTERS   FINDINGS: Enteric tube is in place with the tip outside the field of view. Right IJ [place]-Estevan catheter is in place with the tip projecting over the right main pulmonary artery. Left IJ central venous catheter is projecting over the left brachiocephalic vein. Intra-aortic balloon pump radiopaque marker is projecting over the aortic knob. Pulmonary artery pressure monitoring device is projecting over the left lung hilum. Again seen ECMO cannula projecting over lower SVC.   Status post median sternotomy. The cardiac  silhouette size is unchanged.   Slight interval increase in interstitial edema. No focal infiltrate or pneumothorax. Question trace left pleural effusion.   No acute osseous change.       1. Slight interval worsening of pulmonary edema. 2. Bibasilar atelectasis and suggestion of trace left pleural effusion. 3. Medical devices and postsurgical changes as described above.       Signed by: Angelina Narayan 2/25/2024 7:44 AM Dictation workstation:   [id number]    XR abdomen 1 view    Result Date: 2/24/2024  Interpreted By:  Angelina Cagle, STUDY: XR ABDOMEN 1 VIEW;  2/24/2024 3:23 pm   INDICATION: Signs/Symptoms:dobhoff placement post-extubation.   COMPARISON: Radiograph dated 02/21/2024   ACCESSION NUMBER(S): [id number]   ORDERING CLINICIAN: JILL WALTERS   FINDINGS: Single-view of the abdomen. Lower pelvis is not included. Dobbhoff catheter is projecting over the distal 2nd portion of the duodenum. Nonobstructive bowel gas pattern.  Limited evaluation of pneumoperitoneum on supine imaging, however no gross evidence of free air is noted.   Partially imaged ECMO cannula in sternotomy wires.   Osseous structures demonstrate no acute bony changes.       1.  Nonobstructive bowel gas pattern. 2. Medical devices as described above   Signed by: Angelina Narayan 2/24/2024 4:32 PM Dictation workstation:   [id number]    XR chest 1 view    Result Date: 2/24/2024  Interpreted By:  Angelina Cagle, STUDY: XR CHEST 1 VIEW;  2/24/2024 4:15 am   INDICATION: Signs/Symptoms:Post op cardiac surgery.   COMPARISON: Radiograph dated 02/23/2024   ACCESSION NUMBER(S): [id number]   ORDERING CLINICIAN: LUBNA RIBERA   FINDINGS: ET tube is terminating 1.6 cm from the remedios. Enteric tube is in place with the tip outside the field of view. Right IJ approach [place]-Estevan catheter is projecting over the distal right main pulmonary artery. Intra-aortic balloon pump radiopaque markers projecting over proximal descending  thoracic aorta. ECMO cannulas projecting over the lower SVC. A left IJ central venous catheter is projecting over the expected location of the confluence of brachiocephalic veins. Pulmonary artery pressure monitoring device is projecting over the left lung hilum.   The cardiac silhouette size is within normal limits. Status post median sternotomy.   There is no focal consolidation, edema or pneumothorax. No sizeable pleural effusion. No acute osseous abnormality.       1. Medical devices and postsurgical changes as described above. 2. No focal infiltrate, sizeable pleural effusion, edema or pneumothorax. Mild bibasilar atelectasis.       Signed by: Angelina Narayan 2/24/2024 8:36 AM Dictation workstation:   [id number]    Transthoracic Echo (TTE) Limited    Result Date: 2/23/2024   [facility], [address] [place] [address]                Tel [phone] and Fax [phone] TRANSTHORACIC ECHOCARDIOGRAM REPORT  Patient Name:      MIGUEL DIMAS      Reading Physician:    [id number] Enedelia Kowalski MD Study Date:        2/23/2024            Ordering Provider:    [id number] ODILON FUENTES MRN/PID:           [id number]             Fellow: Accession#:        [id number]         Nurse: Date of Birth/Age: 1990 / 33 years  Sonographer:          Bernadette Madsen RDCS Gender:            F                    Additional Staff: Height:            152.40 cm            Admit Date:           2/15/2024 Weight:            99.34 kg             Admission Status:     Inpatient -                                                               Routine BSA / BMI:         1.94 m2 / 42.77      Encounter#:           [id number]                    kg/m2                                         Department Location:  [facility] Blood  Pressure: 117 /63 mmHg Study Type:    TRANSTHORACIC ECHO (TTE) LIMITED Diagnosis/ICD: Cardiogenic shock-R57.0 Indication:    Turn down trial CPT Code:      Echo Limited-19976 Patient History: Pertinent History: Heart transplant (03/2022), IABP 2/18, ECMP 2/19, intubated                    2/21, cardiogenic shock, allograft rejection. Study Detail: The following Echo studies were performed: 2D. The patient is               intubated and on a balloon pump.  PHYSICIAN INTERPRETATION: Left Ventricle: The left ventricular systolic function is severely decreased, with an estimated ejection fraction of 10%. The left ventricular cavity size is normal. Left ventricular diastolic filling was not assessed. Left Atrium: The left atrium is consistent with transplant. Right Ventricle: The right ventricle is normal in size. There is severely reduced right ventricular systolic function. Right Atrium: The right atrium is consistent with a transplant. Aortic Valve: The aortic valve is trileaflet. Aortic valve regurgitation was not assessed. Mitral Valve: The mitral valve is normal in structure. Mitral valve regurgitation was not assessed. Tricuspid Valve: The tricuspid valve was not well visualized. Tricuspid regurgitation was not assessed. Pulmonic Valve: The pulmonic valve is structurally normal. Pulmonic valve regurgitation was not assessed. Pericardium: There is no pericardial effusion noted. Aorta: The aortic root is normal.  CONCLUSIONS:  1. Left ventricular systolic function is severely decreased with a 10% estimated ejection fraction.  2. Poorly visualized anatomical structures due to suboptimal image quality.  3. There is severely reduced right ventricular systolic function.  4. ECMO turn down trial . At 3 L the ejection fraction was globaly and severely reduced at 10% and remained stable as the flow was gradually decreased down to 1L. QUANTITATIVE DATA SUMMARY: 2D MEASUREMENTS:                          Normal Ranges: IVSd:           1.00 cm   (0.6-1.1cm) LVPWd:         1.00 cm   (0.6-1.1cm) LVIDd:         4.40 cm   (3.9-5.9cm) LVIDs:         4.00 cm LV Mass Index: 76.2 g/m2 LV % FS        9.1 %  [id number] Enedelia Kowalski MD Electronically signed on 2/23/2024 at 3:28:33 PM  ** Final **     XR chest 1 view    Result Date: 2/23/2024  Interpreted By:  Duarte Guillory, STUDY: XR CHEST 1 VIEW;  2/23/2024 1:13 pm   INDICATION: Signs/Symptoms:PA catheter positioning..   COMPARISON: Radiographs since 02/20/2024, the most recent 02/23/2024 at 3:26 a.m..   ACCESSION NUMBER(S): [id number]   ORDERING CLINICIAN: ODILON FUENTES   FINDINGS: Again identified are midline sternotomy sutures, endotracheal and enteric tubes, pulmonary arterial catheter, and right central venous line. The tip of the pulmonary arterial catheter appears to lie in the distal right pulmonary artery or its proximal interlobar branch. The tip of the endotracheal tube lies 2.6 cm above the remedios.   Heart size is within upper limits of normal and unchanged since previous.   There is no convincing pulmonary parenchymal mass nor infiltrate on the current study. There is also no significant pleural fluid or pneumothorax.   Osseous structures are grossly intact.         1. Tubes and lines in place, as noted. 2. No other significant acute cardiopulmonary process on the current examination       MACRO: None   Signed by: Duarte Guillory 2/23/2024 1:59 PM Dictation workstation:   [id number]    XR chest 1 view    Result Date: 2/23/2024  Interpreted By:  Duarte Guillory, STUDY: XR CHEST 1 VIEW;  2/23/2024 3:32 am   INDICATION: Signs/Symptoms:Post op cardiac surgery.   COMPARISON: 02/22/2024.   ACCESSION NUMBER(S): [id number]   ORDERING CLINICIAN: LUBNA RIBERA   FINDINGS: Midline sternotomy sutures are again noted. Also again identified are endotracheal and enteric tubes, pulmonary arterial catheter, and left internal jugular venous line. Tip of IABP is projected along the upper descending  thoracic aorta at the approximate level of the left hilum.   The tip of the endotracheal tube lies 2.7 cm above the remedios.   Lung volumes are slightly diminished. There is questionable mild atelectasis, infiltrate, or pleural fluid at the left lung base, the appearance of which could be at least partially artifactual. Remaining lung fields are otherwise clear. There is no pneumothorax.       1.  Questionable mild left basilar density, which may also been present on the previous day. 2. No other significant acute interval change.       MACRO: None   Signed by: Duarte Guillory 2/23/2024 1:57 PM Dictation workstation:   [id number]    XR chest 1 view    Result Date: 2/23/2024  Interpreted By:  Duarte Guillory, STUDY: XR CHEST 1 VIEW;  2/22/2024 3:46 am   INDICATION: Signs/Symptoms:Post op cardiac surgery.   COMPARISON: Radiographs since 02/20/2024, the most recent 02/21/2024.   ACCESSION NUMBER(S): [id number]   ORDERING CLINICIAN: LUBNA RIBERA   FINDINGS: Again identified are midline sternotomy sutures.   Endotracheal and enteric tubes are again noted. The tip of the endotracheal tube lies 1.6 cm above the remedios.   Tip of the pulmonary arterial catheter is faintly visualized within the main or right pulmonary artery. Left internal jugular venous line is also again noted. Metallic tip of the IABP is again noted along the upper descending thoracic aorta, similar to previous.   Heart size is within upper limits of normal and unchanged since previous.   There are suspicious developing atelectasis or consolidation and pleural fluid at the left base compared to previous. Remaining lung fields are clear other significant new mass or infiltrate. There is no significant pleural fluid on the right. There is no pneumothorax on either side.       1.  Possible developing atelectasis or consolidation and pleural fluid at the left lung base compared to previous. 2. Other findings, as discussed, similar to previous.       MACRO: None    Signed by: Duarte Guillory 2/23/2024 1:54 PM Dictation workstation:   [id number]    Transthoracic Echo (TTE) Limited    Result Date: 2/22/2024   [facility], [address] [place] [address]                Tel [phone] and Fax [phone] TRANSTHORACIC ECHOCARDIOGRAM REPORT  Patient Name:      MIGUEL MONTELONGO JUDIE      Reading Physician:    [id number] Param BASS MD Study Date:        2/22/2024            Ordering Provider:    [id number] KIM MEHTA MRN/PID:           [id number]             Fellow: Accession#:        [id number]         Nurse: Date of Birth/Age: 1990 / 33 years  Sonographer:          Adryan Meredith RDCS Gender:            F                    Additional Staff: Height:            154.94 cm            Admit Date:           2/15/2024 Weight:            99.34 kg             Admission Status:     Inpatient -                                                               Routine BSA / BMI:         1.96 m2 / 41.38      Encounter#:           [id number]                    kg/m2                                         Department Location:  [facility] Blood Pressure: 124 /64 mmHg Study Type:    TRANSTHORACIC ECHO (TTE) LIMITED Diagnosis/ICD: Cardiogenic shock-R57.0 Indication:    cardiogenic shock CPT Code:      Echo Limited-33237; Doppler Limited-74628; Color Doppler-82372 Patient History: Pertinent History: Heart trasnplant rejection, [facility] ecmo 2/19, intubated 2/21,                    IABP 2/18. Study Detail: The following Echo studies were performed: 2D, M-Mode, Doppler and               color flow. Technically challenging study due to body habitus,               patient lying in supine position and ECMO. The patient is               intubated. Definity used as a contrast agent for endocardial                border definition. Total contrast used for this procedure was 3.0               mL via IV push.  PHYSICIAN INTERPRETATION: Left Ventricle: The left ventricular systolic function is severely decreased, with an estimated ejection fraction of 10%. There is global hypokinesis of the left ventricle with minor regional variations. The left ventricular cavity size is normal. Left ventricular diastolic filling was not assessed. Left Atrium: The left atrium is consistent with transplant. Right Ventricle: The right ventricle is normal in size. There is severely reduced right ventricular systolic function. Right Atrium: The right atrium is consistent with a transplant. There is a device visualized in the right atrium. Aortic Valve: The aortic valve is trileaflet. There is mild aortic valve regurgitation. Mitral Valve: The mitral valve is mildly thickened. There is mild to moderate mitral valve regurgitation. Tricuspid Valve: The tricuspid valve is structurally normal. There is mild tricuspid regurgitation. The right ventricular systolic pressure is unable to be estimated. Pulmonic Valve: The pulmonic valve is structurally normal. Pulmonic valve regurgitation was not assessed. Pericardium: There is a trivial pericardial effusion. Aorta: The aortic root is normal.  CONCLUSIONS:  1. Left ventricular systolic function is severely decreased with a 10% estimated ejection fraction.  2. There is severely reduced right ventricular systolic function.  3. Mild to moderate mitral valve regurgitation.  4. Mild aortic valve regurgitation.  5. There is global hypokinesis of the left ventricle with minor regional variations. QUANTITATIVE DATA SUMMARY: 2D MEASUREMENTS:                          Normal Ranges: LAs:           3.50 cm   (2.7-4.0cm) IVSd:          0.80 cm   (0.6-1.1cm) LVPWd:         0.80 cm   (0.6-1.1cm) LVIDd:         4.80 cm   (3.9-5.9cm) LVIDs:         4.60 cm LV Mass Index: 64.5 g/m2 LV % FS        4.2 % TRICUSPID  VALVE/RVSP:                             Normal Ranges: Peak TR Velocity: 2.36 m/s RV Syst Pressure: 25.3 mmHg (< 30mmHg)  [id number] Param Doan MD Electronically signed on 2/22/2024 at 4:13:29 PM  ** Final **     XR abdomen 1 view    Result Date: 2/21/2024  Interpreted By:  Duarte Guillory and Ogievich Taessa STUDY: XR ABDOMEN 1 VIEW;  2/21/2024 9:09 am   INDICATION: Signs/Symptoms:dobhoff.   COMPARISON: Abdominal radiograph 02/19/2024   ACCESSION NUMBER(S): [id number]   ORDERING CLINICIAN: KIM MEHTA   FINDINGS: AP semi-erect abdominal radiograph.   LINES/TUBES/DEVICES: Interval insertion of Dobhoff enteric tube with distal tip overlying the expected location of the 2nd portion of the duodenum. Partial visualization of the [place]-Estevan catheter and ECMO cannula with distal tips out of the field of view.   ABDOMEN: Pelvis is out of the field of view, within this limitation, there is mildly distended gaseous filled bowel loops. Limited evaluation of pneumoperitoneum on supine imaging, however no gross evidence of free air is noted.   LUNGS: Left basilar density likely representing edema, atelectasis, or consolidation. See same day chest x-ray.   BONE: Osseous structures demonstrate no acute bony changes.       1. Interval insertion of Dobhoff enteric tube with distal tip overlying the expected location of the 2nd portion of the duodenum. 2. Suboptimal radiograph with the pelvis out of the field of view. Within this limitation, there is mildly distended gaseous filled bowel loops. Recommend clinical correlation with ileus.   I personally reviewed the images/study and I agree with the findings as stated by Heather Lopez DO, PGY-2. This study was interpreted at [facility], [place], [place].   MACRO: None   Signed by: Duarte Guillory 2/21/2024 10:28 AM Dictation workstation:   [id number]    Transthoracic Echo (TTE) Limited    Result Date: 2/21/2024   [facility], [address] [address]                Tel [phone] and Fax [phone] TRANSTHORACIC ECHOCARDIOGRAM REPORT  Patient Name:      MIGUEL DIMAS      Jax Physician:    [id number] Kei BASS MD Study Date:        2/20/2024            Ordering Provider:    [id number] ZEESHAN RAHMAN MRN/PID:           [id number]             Fellow:               [id number] Zeeshan Rahman MD Accession#:        [id number]         Nurse: Date of Birth/Age: 1990 / 33 years  Sonographer:          RENETTA RAHMAN Gender:            F                    Additional Staff: Height:                                 Admit Date:           2/15/2024 Weight:                                 Admission Status:     Inpatient - STAT BSA / BMI:         m2 / kg/m2           Encounter#:           [id number]                                         Department Location:  [facility] Study Type:    TRANSTHORACIC ECHO (TTE) LIMITED Diagnosis/ICD: 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  PHYSICIAN INTERPRETATION: Left Ventricle: The left ventricular systolic function is severely decreased, with an estimated ejection fraction of 10%. There is global hypokinesis of the left ventricle with minor regional variations. The left ventricular cavity size is normal. Left ventricular diastolic filling was not assessed. Left Atrium: The left atrium is consistent with transplant. Right Ventricle: The right ventricle is normal in size. There is severely reduced right ventricular systolic function. A device is visualized in the right ventricle. Right Atrium: The right atrium  is consistent with a transplant. There is a device visualized in the right atrium. Aortic Valve: The aortic valve is probably trileaflet. There is mild aortic valve regurgitation. Mitral Valve: The mitral valve is mildly thickened. There is moderate mitral valve regurgitation. Tricuspid Valve: The tricuspid valve is structurally normal. There is mild tricuspid regurgitation. The right ventricular systolic pressure is unable to be estimated. Pulmonic Valve: The pulmonic valve is not well visualized. Pulmonic valve regurgitation was not assessed. Pericardium: There is a trivial pericardial effusion. Aorta: The aortic root was not well visualized. There is no dilatation of the aortic root.  CONCLUSIONS:  1. Limited fellow echo.  2. Left ventricular systolic function is severely decreased with a 10% estimated ejection fraction.  3. There is global hypokinesis of the left ventricle with minor regional variations.  4. There is severely reduced right ventricular systolic function.  5. Mild aortic valve regurgitation.  6. Moderate mitral valve regurgitation. QUANTITATIVE DATA SUMMARY: AORTA MEASUREMENTS:                      Normal Ranges: Ao Sinus, d: 3.00 cm (2.1-3.5cm)  [id number] Kei Lozano MD Electronically signed on 2/21/2024 at 10:17:52 AM  ** Final **     XR chest 1 view    Result Date: 2/21/2024  Interpreted By:  Duarte Guillory, STUDY: XR CHEST 1 VIEW;  2/21/2024 3:27 am   INDICATION: Signs/Symptoms:Post op cardiac surgery.   COMPARISON: 02/21/2024, 1:57 a.m..   ACCESSION NUMBER(S): [id number]   ORDERING CLINICIAN: LUBNA RIBERA   FINDINGS: Heart size is unchanged.   Again identified are midline sternotomy sutures, IABP tip along the upper descending thoracic aorta, and left internal jugular venous catheter. The tip of a  pulmonary arterial catheter most likely lies in the right pulmonary artery.   There are persistent diffuse bilateral pulmonary densities that could represent either edema or (less likely)  consolidation. There may also be a left basilar pleural effusion. These findings are either similar to or minimally less prominent than on the radiograph obtained earlier the same date. There is no pneumothorax.   Endotracheal tube has been inserted. Its tip lies approximately 0.7 cm above the remedios.       1.  Bilateral pulmonary densities most likely representing edema. Small left pleural effusion. Findings are either unchanged or at most minimally improved compared to previous. 2. Interval placement of endotracheal tube with tip just above remedios.       MACRO: None   Signed by: Duarte Guillory 2/21/2024 9:09 AM Dictation workstation:   [id number]    XR chest 1 view    Result Date: 2/21/2024  Interpreted By:  Duarte Guillory and Meyers Emily STUDY: XR CHEST 1 VIEW;  2/21/2024 2:02 am   INDICATION: Signs/Symptoms:rhonchi.   COMPARISON: Chest radiograph 02/20/2024   ACCESSION NUMBER(S): [id number]   ORDERING CLINICIAN: ENRRIQUE CRISTINA   FINDINGS: AP radiograph of the chest was provided.   Right IJ approach [place]-Estevan catheter with its tip overlying the expected location of the right main pulmonary artery. Left IJ central venous catheter with its tip overlying the brachiocephalic vein. Stable appearance of an ECMO cannula overlying the expected location of the right atrium. IABP radiopaque marker again overlies the upper descending thoracic aorta. Postsurgical change of prior median sternotomy with cerclage wires.   CARDIOMEDIASTINAL SILHOUETTE: Cardiomediastinal silhouette is normal in size and configuration.   LUNGS: Left lung base is not entirely included. Pulmonary vascularity appears more prominent than on the previous day. There also appear to be new diffuse patchy areas of pulmonary edema or consolidation (more likely the former), right-greater-than-left.   BONES: No acute osseous changes.       1. Interval development of increased pulmonary vascularity and airspace opacities, more prominent on the right.  Although nonspecific, findings most likely represent pulmonary edema; pneumonia less likely but can not be entirely excluded. Recommend correlation with patient's volume status. 2. Medical lines and devices as detailed above.   I personally reviewed the images/study, and I agree with the findings as stated above. This study was interpreted at [place], [place].   MACRO: None   Signed by: Duarte Guillory 2/21/2024 9:07 AM Dictation workstation:   [id number]    XR chest 1 view    Result Date: 2/21/2024  Interpreted By:  Duarte Guillory and Jiang Sirui STUDY: XR CHEST 1 VIEW;  2/20/2024 5:49 pm   INDICATION: Signs/Symptoms:tachypnea.   COMPARISON: Chest radiograph 02/20/2024   ACCESSION NUMBER(S): [id number]   ORDERING CLINICIAN: LUBNA RIBERA   FINDINGS: AP radiograph of the chest was provided.   Stable positioning of the right IJ approach [place]-Estevan catheter, which is seen with distal tip overlying the main pulmonary artery. Left IJ central venous catheter distal tip overlying the brachiocephalic. Stable positioning of the ECMO cannula overlying the upper right atrium. Stable positioning of the IABP with tip at approximate level of the left hilum. Prior median sternotomy with intact sternal cerclage wires. Abandoned epicardial pacer wires are noted.   CARDIOMEDIASTINAL SILHOUETTE: Cardiomediastinal silhouette is stable in size and configuration.   LUNGS: Suggestion of a left-sided pleural effusion. No consolidation or pneumothorax.   ABDOMEN: No remarkable upper abdominal findings.   BONES: No acute osseous changes.       1.  Suggestion of small left-sided pleural effusion and bibasilar atelectasis. No pneumothorax. 2.  Medical devices as above.   I personally reviewed the image(s) / study and I agree with the findings as stated by Gera Pryor MD. This study was interpreted at [place], [place].   MACRO: None   Signed by: Duarte Guillory 2/21/2024  9:05 AM Dictation workstation:   [id number]    XR chest 1 view    Result Date: 2/20/2024  Interpreted By:  Duarte Guillory and Ogievich Taessa STUDY: XR CHEST 1 VIEW;  2/20/2024 3:18 pm   INDICATION: Signs/Symptoms:s/p line placement. Admitted to CTICU for cardiogenic shock. Started on VA ECMO cannulation 2/19.   COMPARISON: Chest x-ray 02/20/2021, 02/19/2024, 2/18/2024, 02/17/2024   ACCESSION NUMBER(S): [id number]   ORDERING CLINICIAN: LUBNA RIBERA   FINDINGS: AP semi-erect radiograph of the chest   LINES/TUBES/DEVICES: Stable postsurgical changes of median sternotomy with sternotomy wires intact and surgical clips overlying the mediastinum. Left IJ central venous catheter with distal tip overlying the expected location of the left brachiocephalic vein. Right-sided IJ approach [place]-Estevan catheter is visualized with the distal tip projecting over the expected region of the right main pulmonary artery. Tip of the intra-aortic balloon pump projects over the expected location of the aortic arch, similar to prior. Partially visualized VA ECMO cannulation with distal tip overlying T7 vertebral body, similar to prior.   CARDIOMEDIASTINAL SILHOUETTE: Enlarged cardiomediastinal silhouette is stable in size and configuration when compared to prior.   LUNGS: Asymmetrical elevation of the right hemidiaphragm relative to the left. Similar mild right perihilar streaky opacity favored to represent atelectasis or consolidation. No sizable pleural effusion or significant pneumothorax.   BONES: No acute osseous abnormality.       1. Medical devices described above. 2. Similar mild right perihilar streaky opacity favored to represent atelectasis or consolidation. 3. Stable cardiomegaly.   I personally reviewed the images/study and I agree with the findings as stated by Heather Lopez DO, PGY-2. This study was interpreted at [facility], [place], [place].   Signed by: Duarte Guillory 2/20/2024 3:53  PM Dictation workstation:   [id number]    Cardiac catheterization - non-coronary    Result Date: 2/20/2024   [facility], Cath Lab, [address] [place] [address] Cardiovascular Catheterization Report Patient Name:      MIGUEL BASS Performing Physician:  [id number] Wright MD Study Date:        2/20/2024             Verifying Physician:   Emily Wright MD MRN/PID:           [id number]              Cardiologist/Co-scrub: Accession#:        [id number]          Ordering Physician:    [id number] J BORGES Date of Birth/Age: 1990 / 33 years   Fellow: Gender:            F                     Fellow: Encounter#:        [id number]  Study:            Endomyocardial Biopsy Additional Study: Other  Indications: Procedure performed to evaluate a recipient of a cardiac transplant.  Procedure Description Comments: Patient arrived to lab with a 9 Fr right IJ sheath. I attempted to use this sheath to perform biopsy but due to ECMO cannula in right atrium I was unable to advance it appropriately. I replaced the 9 Fr sheath with a long braided sheath, and advanced the tip of this sheath to the mid right ventricle. Through this I obtained biopsy samples. I then replaced the long sheath with a standard 9 Fr sheath and floated a swan for use in the CTICU.  Hemo Personnel: +-------------------+---------+ Name               Duty      +-------------------+---------+ Lexie Wright MD 1 +-------------------+---------+  Hemodynamic Pressures:  +----+---------------------+----------+-------------+--------------+---------+ Site      Date Time      Phase NameSystolic mmHgDiastolic mmHgMean mmHg +----+---------------------+----------+-------------+--------------+---------+  Art2/20/2024 12:09:29 PM      Rest          123            75       78  +----+---------------------+----------+-------------+--------------+---------+  Oxygen Saturation %: +-----------+----------+------------+ Sample SiteO2 Sat (%)HB (g/100ml) +-----------+----------+------------+          FA        99         7.9 +-----------+----------+------------+          PA        75         7.9 +-----------+----------+------------+  Cardiac Outputs: +---------------+------------------+-------+ LISA CO (l/min)LISA CI (l/min/m2)LISA SV +---------------+------------------+-------+             9.7               4.8   84.0 +---------------+------------------+-------+  Complications: No in-lab complications observed.  Cardiac Cath Post Procedure Notes: Post Procedure           Heart transplant rejection. Diagnosis: Blood Loss:              Estimated blood loss during the procedure was Minimal                          mls. Specimens Removed:       Number of specimen(s) removed: 4. ____________________________________________________________________________________ CONCLUSIONS:  1. Endomyocardial biopsy performed and 4 samples sent to Pathology.  2. New swan placed in right IJ for ICU use. ICD 10 Codes: Heart transplant rejection-T86.21  CPT Codes: Moderate Sedation Services 2nd additional 15 minutes patient >5 years-[id number]; Moderate Sedation Services 1st additional 15 minutes patient >5 years-[id number]; Moderate Sedation Services 3rd additional 15 minutes patient >5 years-[id number]; Endomyocardial Biopsy-[id number]  [id number] Lexie Wright MD Performing Physician Electronically signed by [id number] Lexie Wright MD on 2/20/2024 at 3:23:48 PM  ** Final **          Assessment/Plan   Charla Bowles is a 33 yo heart transplant recpient (3/2023) who presented on 2/15/24 with signs of acute cellular rejection on heart biopsy 2/16  and multiorgan dysfunction in the setting of ADHF. Multiple pressors started on admission. IABP started on 2/18.  Was on [facility]-ECMO 2/19-  , the impella 5.5 currently on P8. She  has received pulse steroids, was maintained on tacrolimus/sirolimus, 2 sessions of IVIG/plasmapheresis on 2/18, 2/20 and 2 doses of Thymoglobulin 2/18 and 2/19.  Following an endomyocardial biopsy 2/20/2024 which was negative for rejection (in the setting of negative DSAs) - the Thymoglobulin/IVIG/plasmapheresis sessions were stopped. Then treated again with 5 treatments of IVIG/plasmapheresis from 3/5/24. Course c/b worsening cardiogenic shock requiring [facility] ECMO placement on 3/27/24.     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   Life limiting disease: cardiogenic shock, rejection of transplanted heart  Family: Supportive   Performance status: Major limitations due to disease process  Joys/meaning/strength: Family  Understanding of health: Demonstrates good prognostic understanding of disease process  Information: Wants full disclosure of daily updates, prognosis  Goals: Symptom control, to get better and go home  Worries and fears now and future: ongoing symptoms, dying   Code status discussion: pending pt's clinical status; not addressed at today's visit        Thank you for allowing us to care for this patient. Palliative Team will continue to follow as needed. Please contact team with any questions or concerns.     JIMMY Johnson-CNP   Team pager [phone] (weekdays)    I spent 60 minutes in the care of this patient which included chart review, interviewing patient/family, discussion with primary team, coordination of  care, and documentation.    Medical Decision Making was high level due to high complexity of problems, extensive data review, and high risk of management/treatment.

## 2024-04-01 NOTE — SIGNIFICANT EVENT
"Nasal packing was removed from the right.  Septal mucosa was raw/traumatized from previous nasal packing.  There was a small area of concern on the right anterior nasal septum.  A small piece of surgicel was placed.    Recommendations:  - Can use oxymetazoline (Afrin) 2 sprays each nostril every 8 hours for the next two days (three days total) if further bleeding or oozing occurs. Do not use for more than 3 days total.   - If further bleeding occurs, use copious oxymetazoline spray for epistaxis and pinch the nose (\"hold pressure\") for 15 minutes without letting go. If this does not control bleeding then gently blow out clot and repeat 2 more times.  -Nasal saline or Ayr nasal gel multiple times a day for 10 days - please apply even with packing in the nares  -BP control per primary team  -Avoid digital trauma or sticking objects into nose  -Humidifier at night, avoid heaters directly blowing in face    "

## 2024-04-01 NOTE — SIGNIFICANT EVENT
Nasal packing was removed from the right.  Septal mucosa was raw/traumatized from previous nasal packing.  There was a small area of concern on the right anterior nasal septum.  A small piece of surgicel was placed.

## 2024-04-01 NOTE — PROGRESS NOTES
HFICU Attending Note    34F (ABO O) with PPCM s/p 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 c/b immunosuppression intolerance with 2R ACR 11/2022 admitted 2/15 with graft dysfunction of unclear etiology (C3d and C4d +) treated with empiric  and severe restrictive physiology managed with ECMO and impella 5.5 support. She has acute renal failure now with >6 weeks of RRT needs and is currently being evaluated for dual kidney/heart transplantation. Barriers include cytopenias, bilirubinemia, respiratory failure, delirium and volume overload. Goal to extubate, discuss alternative cannulation strategy/ongoing need for LVAD. Tac subtherapeutic, will escalate dose,     Rediscuss at [facility] selection 4/2      This critically ill patient continues to be at-risk for clinically significant deterioration / failure due to the above mentioned dysfunctional, unstable organ systems.  I have personally identified and managed all complex critical care issues to prevent aforementioned clinical deterioration.  Critical care time is spent at bedside and/or the immediate area and has included, but is not limited to, the review of diagnostic tests, labs, radiographs, serial assessments of hemodynamics, respiratory status, ventilatory management, and family updates.  Time spent in procedures and teaching are reported separately.    Critical care time: __45__ minutes     Objective    Charla Geller Bowles/[id number]  Admit Date: 2/15/2024  Hospital Length of Stay: 46   ICU Length of Stay: 4d 12h     MEDICATIONS  Infusions:  dexmedeTOMIDine, Last Rate: 1 mcg/kg/hr (04/01/24 0600)  lactated Ringer's, Last Rate: 5 mL/hr (04/01/24 0600)  PrismaSol 4/2.5  sodium bicarbonate infusion Impella Purge 25 mEq/1000 mL D5W, Last Rate: 10 mL/hr (04/01/24 0600)      Scheduled:  acetaminophen, 650 mg, q4h  albumin human, 25 g, q6h  calcium carbonate-vitamin D3, 1 tablet, Daily  cephalexin, 500 mg, q12h TRICIA  cyanocobalamin, 500 mcg, Daily  darbepoetin floyd, 100 mcg, q14 days  ferrous  sulfate (325 mg ferrous sulfate), 65 mg of iron, Daily with breakfast  folic acid, 1 mg, Daily  hydrALAZINE, 10 mg, q8h  insulin lispro, 0-15 Units, q4h  levothyroxine, 200 mcg, Daily  lidocaine, 1 patch, Daily  multivitamin with iron-minerals, 15 mL, Daily  multivitamin with minerals, 1 tablet, Daily  [Held by provider] mycophenolate, 180 mg, BID  nystatin, 5 mL, q6h  oxygen, , Continuous - Inhalation  pantoprazole, 40 mg, Daily  polyethylene glycol, 17 g, BID  potassium, sodium phosphates, 2 packet, q8h  predniSONE, 10 mg, Daily  sennosides-docusate sodium, 2 tablet, BID  [Held by provider] sulfamethoxazole-trimethoprim, 80 mg of trimethoprim, Daily  tacrolimus, 1.5 mg, q24h  tacrolimus, 1.5 mg, q24h  traZODone, 25 mg, Nightly  valGANciclovir, 450 mg, q48h      PRN:  bisacodyl, 10 mg, Daily PRN  calcium gluconate, 1 g, q6h PRN  calcium gluconate, 2 g, q6h PRN  dextrose, 25 g, q15 min PRN  dextrose, 25 g, q15 min PRN   Or  glucagon, 1 mg, q15 min PRN  dextrose, 25 g, q15 min PRN   Or  glucagon, 1 mg, q15 min PRN  glucagon, 1 mg, q15 min PRN  hydrALAZINE, 10 mg, q4h PRN  HYDROmorphone, 0.2 mg, q4h PRN  artificial tears, 2 drop, PRN  magnesium sulfate, 2 g, q6h PRN  magnesium sulfate, 4 g, q6h PRN  microfibrllar collagen, , PRN  mupirocin, , BID PRN  naloxone, 0.2 mg, q5 min PRN  ondansetron, 4 mg, q4h PRN  oxyCODONE, 5 mg, q4h PRN  oxymetazoline, 2 spray, q12h PRN  sodium chloride, 1 spray, 4x daily PRN        Prior to Admission Meds:  Medications Prior to Admission   Medication Sig Dispense Refill Last Dose    Alcohol Prep Pads pads, medicated        amLODIPine (Norvasc) 2.5 mg tablet TAKE ONE (1) TABLET BY MOUTH ONCE DAILY 30 tablet 11     aspirin 81 mg EC tablet TAKE ONE (1) TABLET BY MOUTH ONCE A DAY 30 tablet 10     blood sugar diagnostic (OneTouch Verio test strips) strip 4 times a day.       calcitriol (Rocaltrol) 0.5 mcg capsule TAKE ONE (1) CAPSULE BY MOUTH ONCE DAILY. 90 capsule 3     calcium carbonate  (Oscal) 500 mg calcium (1,250 mg) tablet TAKE ONE (1) TABLET BY MOUTH TWICE DAILY. TAKE AT LEAST 2 HOURS BEFORE OR 2 HOURS AFTER IMMUNOSUPPRESSION MEDICATIONS. 60 tablet 11     docusate sodium (Colace) 100 mg capsule Take 1 capsule (100 mg) by mouth 2 times a day as needed.       ferrous sulfate, 325 mg ferrous sulfate, tablet TAKE ONE (1) TABLET BY MOUTH DAILY. 90 tablet 3     insulin lispro (HumaLOG) 100 unit/mL injection USE FOR SLIDING SCALE INSULIN COVERAGE UP TO 4 TIMES DAILY AS DIRECTED. MAX OF 40 UNITS PER DAY. 15 mL 11     levothyroxine (Synthroid, Levoxyl) 200 mcg tablet TAKE ONE (1) TABLET BY MOUTH ONCE DAILY. (Patient taking differently: Take 2 tablets (400 mcg) by mouth once daily.) 90 tablet 3     [] magnesium oxide (Mag-Ox) 400 mg (241.3 mg magnesium) tablet TAKE TWO (2) TABLETS BY MOUTH DAILY 60 tablet 11     multivitamin tablet Take 1 tablet by mouth once daily.       [] nystatin (Mycostatin) cream Apply topically 2 times a day. 15 g 1     pantoprazole (ProtoNix) 40 mg EC tablet TAKE ONE (1) TABLET BY MOUTH DAILY. 30 tablet 11     predniSONE (Deltasone) 5 mg tablet TAKE ONE (1) TABLET BY MOUTH ONCE DAILY. 90 tablet 3     rosuvastatin (Crestor) 40 mg tablet TAKE ONE (1) TABLET BY MOUTH DAILY. 90 tablet 3     sirolimus (Rapamune) 0.5 mg tablet Take 1 tablet (0.5 mg) by mouth once daily. 90 tablet 3     tacrolimus (Prograf) 0.5 mg capsule Take 1 capsule (0.5 mg) by mouth 2 times a day. 60 capsule 11     tacrolimus (Prograf) 1 mg capsule Take 1 capsule (1 mg) by mouth 2 times a day. 60 capsule 3     [] traMADol (Ultram) 50 mg tablet Take 1 tablet (50 mg) by mouth every 6 hours if needed for severe pain (7 - 10). 100 tablet 0        Invasive Hemodynamics:    Most Recent Range Past 24hrs   BP (Art) 78/75 Arterial Line BP 1  Min: 65/58  Max: 87/82   MAP(Art) 77 mmHg Arterial Line MAP 1 (mmHg)   Min: 60 mmHg  Max: 84 mmHg   RA/CVP   No data recorded   PA 41/34 No data recorded    PA(mean) 37 mmHg No data recorded   PCWP  (Unable to obtain, Provider aware) No data recorded   CO 5.5 L/min No data recorded   CI 2.8 L/min/m2 No data recorded   Mixed Venous 95 % SVO2 (%)  Min: 85.4 %  Max: 99.6 %   SVR  1115 (dyne*sec)/cm5 No data recorded   PVR 88 (dyne*sec)/cm5 No data recorded       ECMO:     Most Recent Range Past 24hrs   Flow 3.75 Patient Flow (L/min)  Min: 3.49  Max: 3.95   Speed 3165 Circuit Flow (RPM)  Min: 3165  Max: 3165   Sweep 0.5 Sweep Gas Flow Rate (L/min)  Min: 0.5  Max: 2     Impella:      Most Recent Range Past 24hrs   Performance Level 4 P Level  Min: 4   Min taken time: 04/01/24 0600  Max: 4   Max taken time: 04/01/24 0600   Flow (L/min) 1.2 Flow (L/min)  Min: 1.1   Min taken time: 03/31/24 0800  Max: 2.2   Max taken time: 03/31/24 2000   Motor Current 201/157 Motor Current  Min: 199/154   Min taken time: 04/01/24 0400  Max: 290/160   Max taken time: 04/01/24 0000   Placement Signal Yes  Placement OK could not be evaluated. This SmartLink does not work with rows of the type: Custom List   Purge (mmHg) 488 Purge Pressure (mmHg)  Min: 351   Min taken time: 03/31/24 1800  Max: 612   Max taken time: 04/01/24 0500   Purge rate (mL/hr) 12 Purge Rate (mL/hr)  Min: 11.2   Min taken time: 03/31/24 0800  Max: 12.8   Max taken time: 03/31/24 1500     VENT:    Most Recent Range Past 24hrs   Mode Volume control/assist control    FiO2 30 % FiO2 (%)  Min: 30 %   Min taken time: 04/01/24 0744  Max: 30 %   Max taken time: 04/01/24 0744   Rate 12 Resp Rate (Set)  Min: 12   Min taken time: 04/01/24 0342  Max: 12   Max taken time: 04/01/24 0342   Vt 350 mL  Vt (Set, mL)  Min: 350 mL   Min taken time: 04/01/24 0342  Max: 350 mL   Max taken time: 04/01/24 0342   PEEP 10 cm H20 PEEP/CPAP (cm H2O)  Min: 10 cm H20   Min taken time: 04/01/24 0342  Max: 10 cm H20   Max taken time: 04/01/24 0342 4/1/2024     7:00 AM 4/1/2024     6:00 AM 4/1/2024     5:00 AM 4/1/2024     4:00 AM 4/1/2024     3:00  "AM 4/1/2024     2:00 AM 4/1/2024     1:00 AM   Vitals   Heart Rate 76 78 75 75 74 75 73   Temp    36.5 °C (97.7 °F)      Resp 12 12 12 12 12 14 12   Weight (lb)  199.08        BMI  37.63 kg/m2        BSA (m2)  1.97 m2          Visit Vitals  BP 78/74   Pulse 76   Temp 36.5 °C (97.7 °F) (Temporal)   Resp 12   Ht 1.549 m (5' 0.98\")   Wt 90.3 kg (199 lb 1.2 oz)   SpO2 100%   BMI 37.63 kg/m²   OB Status Hysterectomy   Smoking Status Never   BSA 1.97 m²     Wt Readings from Last 5 Encounters:   04/01/24 90.3 kg (199 lb 1.2 oz)   12/07/23 92.1 kg (203 lb)   12/01/23 93 kg (205 lb)   11/29/23 92.9 kg (204 lb 12.8 oz)   11/09/23 91.3 kg (201 lb 3.2 oz)       Intake/Output Summary (Last 24 hours) at 4/1/2024 0744  Last data filed at 4/1/2024 0700  Gross per 24 hour   Intake 2867.01 ml   Output 3567 ml   Net -699.99 ml     CHEST: Unlabored, Rhonchi, Diminished  CV:  Normal sinus rhythm  ABD:  Soft, Nondistended Soft, No guarding Bowel sounds: All quadrants, Flatus: Yes  EXT:   RLE: Appropriate for ethnicity,Warm  DP: Doppler  PT: Doppler  LLE: Appropriate for ethnicity,Warm  DP: Doppler  PT: Doppler  NEURO:   RASS: Light sedation  CAM: Negative  LOC: Responds to voice  Cognition: Appropriate judgement, Appropriate attention/concentration, Follows commands  GCS: 11    DATA:  CMP:  Recent Labs     04/01/24  0233 03/31/24  1156 03/31/24  0514 03/31/24  0042 03/30/24  1240 03/30/24  0746 03/29/24  2102 03/29/24  1208 03/29/24  0056 03/28/24  1107 03/28/24  0146    136 135*  --  135* 136 136 134* 134* 132* 132*   K 4.7 4.4 4.4  --  4.2 3.8 4.1 4.1 4.2 4.5 4.5    101 101  --  101 101 101 101 101 101 100   CO2 27 27 23  --  23 27 22 25 26 25 26   ANIONGAP 14 12 15  --  15 12 17 12 11 11 11   BUN 14 14 14  --  11 12 13 13 15 21 20   CREATININE 0.80 0.85 0.92  --  1.00 0.99 1.02 1.14* 1.26* 1.65* 1.38*   EGFR >90 >90 84  --  76 77 75 65 58* 42* 52*   MG 2.45* 2.49*  --  2.52* 2.39 2.45* 2.57* 2.46* 2.44* 2.42* 2.40 "     Recent Labs     04/01/24  0233 03/31/24  1156 03/31/24  0514 03/31/24  0042 03/30/24  1240 03/30/24  0746 03/29/24  2102 03/29/24  1208 03/29/24  0056 03/28/24  1107 03/28/24  0157 03/19/24  0605 03/18/24  1244 03/18/24  0315 03/17/24  1533 03/24/23  0842 03/17/23  0838   ALBUMIN 4.5 3.6 3.7 3.6 3.8 3.6 3.5 3.1* 2.9*   < > 2.8*   < >  --    < > 3.4   < > 3.8   ALT 21 29  --  34 41 47*  --  83* 105*  --  180*   < >  --    < > 71*   < > 8   * 319*  --  377* 374* 375*  --  500* 457*  --  383*   < >  --    < > 213*   < > 17   BILITOT 2.4* 4.2*  --  3.9* 3.8* 3.2*  --  4.3* 4.3*  --  3.7*   < >  --    < > 2.6*   < > 0.4   LIPASE  --   --   --   --   --   --   --   --   --   --   --   --  46  --  93*  --  8*    < > = values in this interval not displayed.     CBC:  Recent Labs     04/01/24  0233 03/31/24  1854 03/31/24  1156 03/31/24  0042 03/30/24  1505 03/30/24  1111 03/30/24  0522 03/29/24  2102   WBC 4.0* 4.6 5.1 5.5 5.4 4.9 6.0 6.4   HGB 7.6* 7.8* 8.3* 8.2* 8.5* 7.3* 6.5* 6.9*   HCT 24.0* 25.2* 25.3* 25.5* 25.0* 22.4* 19.2* 20.4*   PLT 36* 65* 85* 23* 51* 27* 30* 59*   MCV 90 88 87 88 85 88 91 92     COAG:   Recent Labs     04/01/24  0233 03/31/24  1156 03/31/24  0042 03/30/24  1240 03/30/24  0746 03/30/24  0522 03/30/24  0038 03/29/24  2102 03/29/24  1330 03/29/24  1208 03/29/24  0350 03/29/24  0056 03/28/24  1956 03/28/24  1107 03/28/24  0559 03/28/24  0557 03/28/24  0146 03/27/24  1949 03/27/24  0538 03/27/24  0351 03/26/24  0532 05/03/22  0607 04/19/22  1330   INR 1.2* 1.1 1.2* 1.2*  --  1.2*  --   --   --  1.3*  --   --   --  1.8*  --   --  2.4* 3.3*   < > 2.7* 2.9*   < >  --    ARIXTRA  --   --   --   --   --   --   --   --   --   --   --   --   --   --   --   --   --   --   --   --   --   --  1.36   HAUF  --  0.2  --  0.5  --   --  0.4 0.4 0.5  --    < >  --    < >  --   --    < >  --   --    < >  --   --    < >  --    HAPTOGLOBIN  --   --  <10*  --  <10*  --   --   --   --   --   --  <10*  --   --   <10*  --   --   --   --  <10* <10*   < >  --    FIBRINOGEN  --   --   --   --   --   --   --   --   --   --   --   --   --  154*  --   --  171* 152*  --  166* 169*   < >  --     < > = values in this interval not displayed.     ABO:   Recent Labs     03/31/24  0903   ABO O     HEME/ENDO:   Recent Labs     03/18/24  0315 03/17/24  1533 03/07/24  1402 03/04/24  0338 02/16/24  0116 07/18/23  0924 03/17/23  0838 01/18/23  1557 01/04/23  0825 12/14/22  1541 07/12/22  0722 05/20/22  1521 02/06/22  1614 02/02/22  1703 09/18/20  0715 09/14/20  1234   FERRITIN  --   --   --   --  308*  --   --   --   --   --   --  115  --  24  --  32   IRONSAT  --   --   --   --  13*  --   --  24*  --   --   --  25  --  7*   < >  --    TSH 20.74* 15.88* 14.87* 35.92* 12.02*   < > 6.13*  --    < > 40.64*   < >  --    < > 0.32*   < >  --    HGBA1C  --   --  5.7*  --  6.3*  --  5.7*  --   --  6.4*   < >  --   --   --    < >  --     < > = values in this interval not displayed.     CARDIAC:   Recent Labs     04/01/24  0233 03/31/24  0042 03/29/24  2102 03/29/24  0056 03/28/24  0146 03/27/24  1949 03/27/24  0351 03/26/24  1513 03/08/24  0549 03/07/24  1402 02/19/24  2346 02/16/24  0116 02/15/24  2203 09/06/23  0845 03/29/23  0840 01/18/23  1557 11/10/22  2322 05/02/22  0855 04/25/22  0858   LDH 1,908* 3,038* 2,781* 2,276* 1,539* 1,422* 1,360* 1,333*   < >  --    < >  --   --   --   --    < >  --   --   --    TROPHS  --   --   --   --   --   --   --   --   --   --   --  168* 125*  --   --   --  113*  --   --    BNP  --   --   --   --   --   --   --   --   --  4,683*  --  >5,000* >5,000* 66 60  --  53 1,191* 1,797*    < > = values in this interval not displayed.     Recent Labs     04/01/24  0616 04/01/24  0244 03/31/24  2118 03/31/24  1620 03/31/24  1156 03/27/24  1011 03/21/24  1013 03/16/24  0518 03/15/24  2310 03/15/24  1643 03/15/24  1217 03/15/24  0526   LACMX  --   --   --   --   --   --   --  1.1 1.0 1.6 1.5 1.5   LACTATEART 0.5 0.5 0.4 0.3*  0.5   < >  --   --   --   --   --   --    SO2MV  --   --   --   --   --   --  48 51 42* 42* 58 47    < > = values in this interval not displayed.     Recent Labs     03/31/24  0558 03/30/24  0607 03/29/24  0530 03/28/24  0557 03/27/24  0538 03/26/24  0532 03/25/24  0559 03/24/24  0048 03/21/24  0609 03/20/24  0607 03/19/24  0605 03/18/24  0315 03/17/24  0550 03/16/24  0611 03/15/24  0609 03/14/24  0623 03/13/24  0558   TACROLIMUS 5.0 4.1 7.4 10.0 14.4 15.0 12.3 10.4   < > 3.3 3.1 3.6 3.9 4.9 4.9 8.9 5.5   SIROLIMUS  --   --   --   --   --   --   --   --   --  5.7 5.6 6.6 7.9 8.8 9.8 10.8 9.0    < > = values in this interval not displayed.     Recent Labs     02/16/24  0116 07/18/23  0924 03/17/23  0838 11/10/22  2322   CHOL 162 279* 211* 177   LDLF  --  184* 109* 81   LDLCALC 94  --   --   --    HDL 39.2 63.4 62.9 52.2   TRIG 142 158* 198* 220*     MICRO:   Recent Labs     03/27/24  1020 02/15/24  2203 01/18/23  1557   CRP 4.27* 6.06* 0.68     No results found for the last 90 days.      LDA:  ECMO Cannulation Peripheral Right;Femoral artery;Femoral vein (Active)   Placement Date/Time: 03/27/24 1700   ECMO Type: Peripheral  Cannulation Site: Right;Femoral artery;Femoral vein  Line Securement: Line secured in 2 locations;Securement device;Sutures   Number of days: 4       Arterial Line 03/27/24 Right Radial (Active)   Placement Date/Time: 03/27/24 1545   Orientation: Right  Location: Radial   Number of days: 4       Ventricular Assist Device Impella 5.5 (Active)   Placement Date/Time: 03/01/24 1956   Placed by: Dr Viramontes  Hand Hygiene Completed: Yes  Site Location: Axilla right  VAD Type: Left ventricular assist device  VAD Brand: Impella 5.5   Number of days: 30       ETT  7.5 mm (Active)   Placement Date/Time: 03/27/24 (c) 1618   Mask Ventilation: Vent by mask + OA or adjuvant +/- NMBA  Technique: Direct laryngoscopy  ETT Type: ETT - single  Single Lumen Tube Size: 7.5 mm  Cuffed: Yes  Laryngoscope: Purvi   Blade Size: 4  Location: O...   Number of days: 4       NG/OG/Feeding Tube Center mouth (Active)   Earliest Known Present: 03/28/24   Type of Tube: NG/OG Tube  Tube Length: 56 cm  Tube Location: Center mouth   Number of days: 4       Hemodialysis Cath Triple lumen Right Jugular (Active)   Earliest Known Present: 03/10/24   Hemodialysis Catheter Type: Triple lumen  Securement Method: Sutured  Patient Tolerance: Tolerated well  Orientation: Right  Access Location: Jugular   Number of days: 22     NUTRITION: Enteral feeding with NPO Pivot 1.5; [id number]. Hold TF one hour before and after synthroid; 30  EMERGENCY CONTACT: Extended Emergency Contact Information  Primary Emergency Contact: SAHIL DIMAS NCIOLETTE  Address: [address] [place] [address]  Home Phone: [phone]  Mobile Phone: [phone]  Relation: Mother  CODE STATUS: Full Code  DISPO: Discharge Planning  Living Arrangements: Children  Support Systems: Parent  Assistance Needed: Unable to Express (Intubated/Sedated)  Type of Residence: Private residence  Who is requesting discharge planning?: Provider (discharge planning is an ongoing daily process)  Home or Post Acute Services: Other (Comment) (tbd)  Patient expects to be discharged to:: tbd  Does the patient need discharge transport arranged?: Yes  RoundTrip coordination needed?: Yes  Has discharge transport been arranged?: No  FOLLOWUP:   Future Appointments   Date Time Provider Department Center   4/4/2024 12:20 PM Tj Holder MD [id number] [facility]   4/22/2024 10:30 AM Elisabeth Acevedo PA-C [id number] [facility]   7/22/2024  2:15 PM Jennifer March MD [id number] [facility]

## 2024-04-01 NOTE — CARE PLAN
The patient's goals for the shift include  Extubate     The clinical goals for the shift include fluid removal via CRRT, maintain hemodynamic stability, improve air exchange and reduce respiratory strain    Over the shift, the patient did not make progress toward the following goals. Barriers to progression include BP below goal MAP tihs afternoon. Decreased fluid removal and gave 25% Albumin to correct hypotension..

## 2024-04-01 NOTE — CONSULTS
"Vancomycin Dosing by Pharmacy- INITIAL    Charla MONTELONGO Yimi Bowles is a 34 y.o. year old female who Pharmacy has been consulted for vancomycin dosing for pneumonia/sepsis. Based on the patient's indication and renal status this patient will be dosed based on a goal AUC of 500-600.     Renal function is currently stable.    Visit Vitals  BP 78/74   Pulse 80   Temp 36 °C (96.8 °F) (Axillary)   Resp 17        Lab Results   Component Value Date    CREATININE 0.85 04/01/2024    CREATININE 0.80 04/01/2024    CREATININE 0.85 03/31/2024    CREATININE 0.92 03/31/2024        Patient weight is No results found for: \"PTWEIGHT\"    No results found for: \"CULTURE\"     I/O last 3 completed shifts:  In: 3472.8 (38.5 mL/kg) [I.V.:1239.8 (13.7 mL/kg); Blood:483.1; NG/GT:1050; IV Piggyback:700]  Out: 5865 (65 mL/kg) [Other:5865]  Weight: 90.3 kg   [unfilled]    Lab Results   Component Value Date    PATIENTTEMP 37.0 04/01/2024    PATIENTTEMP 37.0 04/01/2024    PATIENTTEMP 37.0 04/01/2024          Assessment/Plan     Patient will be given a loading dose of 2000 mg.  Will initiate vancomycin maintenance,  2000 mg every 24 hours.    This dosing regimen is predicted by [employer] to result in the following pharmacokinetic parameters:    Loading dose: N/A  Regimen: 2000 mg IV every 24 hours.  Start time: 05:14 on 03/29/2024  Exposure target: AUC24 (range)400-600 mg/L.hr   AUC24,ss: 420 mg/L.hr  Probability of AUC24 > 400: 55 %  Ctrough,ss: 9.9 mg/L  Probability of Ctrough,ss > 20: 11 %  Probability of nephrotoxicity (Lodise MANNY 2009): 6 %      Follow-up level will be ordered on 4/3 at AM labs, unless clinically indicated sooner.  Will continue to monitor renal function daily while on vancomycin and order serum creatinine at least every 48 hours if not already ordered.  Follow for continued vancomycin needs, clinical response, and signs/symptoms of toxicity.       EPHRAIM CARSON, PharmD       "

## 2024-04-01 NOTE — PROGRESS NOTES
CTICU Attending Assessment Note    This is a 34yoF who is s/p cardiac transplant with concern for acute rejection of unclear etiology despite maximal therapy in cardiogenic shock requiring VA ECMO and Impella as well as acute renal failure requiring CRRT and acute respiratory failure.    Neuro: Recently CAM positive on dexmedetomidine but this am CAM negative for me with goal RASS 0 to -2 and following commands in all extremities. Continue PO/topical multimodal pain therapy to adequately control pain. Continue CAM assessment and encourage restful sleep per ICU protocols.  Continue bedrest but PT/OT.  HENT: Epistaxis with packing in place.  Continue Keflex for prophylaxis.  Appreciate ENT recommendations  CV: Currently on VA ECMO and Impella for support without additional vasoactive medication need. Tolerated Impella wean to P4 at 1.5L flow and will discuss potential weaning Impella further. Impella positioning in question as etiology of hemolysis and will discuss with CT surgery need for potentially repositioning which has been an ongoing discussion Awaiting HF recommendations regarding potential relisting for transplantation. VA ECMO at 3165 rpm with 3L flow.  Goal MAP 70-90 and CI >2.2. Proximal L SFA occlusion with collaterals and adequate flows. R Axillary Impella.  R VA ECMO configuration with DRC in place.  Pulm: Acute hypoxic respiratory failure with /12/10/30%. Continue aggressive pulmonary toilet and aim for net negative fluid balance to wean O2 requirements. VA ECMO with 100% and 0.5 sweep. Will attempt to reduce PEEP today with goal to wean to extubate over the coming days.  Renal: Acute renal failure on CRRT.  Appreciate renal transplant team recommendations and will continue to plan for aim net negative fluid balance.   GI: Recent congestive hepatopathy that is improving with hx of colitis. Continued adequate BM and continue TF at goal.  Heme: Acute postoperative blood loss anemia with goal Hgb  >7 and platelets >25. No indication for transfusion at this time. Bilateral DVT and SFA occlusion and will start low dose heparin today. Recent PF4 is negative. Holding all AC at this time in light of epistaxis. HCO3 via Impella purge. Hemolysis with downtrending LDH this am and likely attributed to Impella.  Thrombocytopenia  Endo: Hypothyroidisim and currently on levothyroxine.  Appreciate endocrine recommendations.  Will continue SSI protocol with goal -180 per post-cardiotomy goal.  ID: Currently on cephalexin for nasal packing. No other indications for antibiotics at this time. Immunosuppression plan per HF with tacrolimus, prednisone, nystatin, Valcyte.  Skin/MSK: L groin wound site and continue to follow wound care recommendations.  Prophy: Continue SCD, vent bundle, PPI.  Maintain R IJ CVC as limited additional access options given current cannulation strategy, wounds, and known clots. Awaiting multidisciplinary team discussion regarding potential relisting for cardiac/renal transplantation.  A-F Bundle reviewed and addressed as above with multidisciplinary rounds completed at bedside.  Dispo: Continue CTICU care.    Due to the high probability of life threatening clinical decompensation, the patient required critical care time evaluating and managing this patient.  Critical care time included obtaining a history, examining the patient, ordering and reviewing studies, discussing, developing, and implementing a management plan, evaluating the patient's response to treatment, and discussion with other care team providers. I saw and evaluated the patient myself. I reviewed the provider's documentation and discussed the patient with the provider. Critical care time was performed exclusive of billable procedures.    Critical Care Time: 45 minutes    Feliciano Lee MD  Emergency Critical Care Attending Physician

## 2024-04-01 NOTE — PROGRESS NOTES
"        INPATIENT TRANSPLANT NEPHROLOGY PROGRESS NOTE          REASON FOR CONSULT:  Immunosuppressive medication management and nephrology related issues.    SUBJECTION:  CVVH Procedure note    ECMO  On pressors  Tolerated CVVH well    No acute event overnight.    PHYCISCAL EXAMINATION:    Visit Vitals  BP 78/74   Pulse 88   Temp 36 °C (96.8 °F) (Axillary)   Resp 21   Ht 1.549 m (5' 0.98\")   Wt 90.3 kg (199 lb 1.2 oz)   SpO2 92%   BMI 37.63 kg/m²   OB Status Hysterectomy   Smoking Status Never   BSA 1.97 m²        03/30 1900 - 04/01 0659  In: 3472.8 [I.V.:1239.8]  Out: 5865      Weight change:     Constitutional:       General: She is not in acute distress.     Appearance: Normal appearance. She is ill-appearing and toxic-appearing.   HENT:      Head: Normocephalic.      Mouth/Throat:      Mouth: Mucous membranes are moist.   Eyes:      Extraocular Movements: Extraocular movements intact.      Pupils: Pupils are equal, round, and reactive to light.   Neck:      Comments: RIJ dialysis line present - CDI  Cardiovascular:      Rate and Rhythm: Regular rhythm. Tachycardia present.      Pulses: Normal pulses.      Heart sounds: Normal heart sounds.   Pulmonary:      Effort: Pulmonary effort is normal. No respiratory distress.      Breath sounds: Normal breath sounds.   Chest:      Comments: Right axillary impella site CDI   Abdominal:      General: Bowel sounds are normal.      Palpations: Abdomen is soft.      Tenderness: There is no abdominal tenderness.   Musculoskeletal:         General: Normal range of motion.      Cervical back: Normal range of motion.      Right lower leg: Edema present.      Left lower leg: Edema present.   Skin:     General: Skin is warm and dry.      Capillary Refill: Capillary refill takes 2 to 3 seconds.      Coloration: Skin is jaundiced.      Comments: Left groin ECMO cannulation site with drainage    Neurological:      General: No focal deficit present.      Mental Status: She is alert and " oriented to person, place, and time.   Psychiatric:         Behavior: Behavior normal. Behavior is cooperative.     MEDICATION LIST: REVIEWED    acetaminophen, 650 mg, q6h  calcium carbonate-vitamin D3, 1 tablet, Daily  [Held by provider] cephalexin, 500 mg, q12h TRICIA  cyanocobalamin, 500 mcg, Daily  darbepoetin floyd, 100 mcg, q14 days  ferrous sulfate (325 mg ferrous sulfate), 65 mg of iron, Daily with breakfast  folic acid, 1 mg, Daily  insulin lispro, 0-15 Units, q4h  levothyroxine, 200 mcg, Daily  lidocaine, 1 patch, Daily  multivitamin with iron-minerals, 15 mL, Daily  multivitamin with minerals, 1 tablet, Daily  [Held by provider] mycophenolate, 180 mg, BID  nystatin, 5 mL, q6h  oxygen, , Continuous - Inhalation  oxymetazoline, 2 spray, q12h  pantoprazole, 40 mg, Daily  piperacillin-tazobactam, 4.5 g, q6h  polyethylene glycol, 17 g, BID  potassium, sodium phosphates, 2 packet, q8h  predniSONE, 10 mg, Daily  sennosides-docusate sodium, 2 tablet, BID  sodium chloride, 1 spray, 4x daily  [Held by provider] sulfamethoxazole-trimethoprim, 80 mg of trimethoprim, Daily  [START ON 4/2/2024] tacrolimus, 3 mg, q24h  tacrolimus, 3 mg, q24h  traZODone, 25 mg, Nightly  valGANciclovir, 450 mg, q48h  vancomycin, 1,000 mg, Once   Followed by  vancomycin, 1,000 mg, Once      dexmedeTOMIDine, Last Rate: Stopped (04/01/24 1434)  heparin, Last Rate: 100 Units/hr (04/01/24 0921)  lactated Ringer's, Last Rate: 5 mL/hr (04/01/24 0836)  PrismaSol 4/2.5  sodium bicarbonate infusion Impella Purge 25 mEq/1000 mL D5W, Last Rate: 10 mL/hr (04/01/24 0836)      bisacodyl, 10 mg, Daily PRN  calcium gluconate, 1 g, q6h PRN  calcium gluconate, 2 g, q6h PRN  dextrose, 25 g, q15 min PRN  dextrose, 25 g, q15 min PRN   Or  glucagon, 1 mg, q15 min PRN  hydrALAZINE, 10 mg, q4h PRN  HYDROmorphone, 0.2 mg, q4h PRN  artificial tears, 2 drop, PRN  magnesium sulfate, 2 g, q6h PRN  magnesium sulfate, 4 g, q6h PRN  microfibrllar collagen, , PRN  mupirocin,  , BID PRN  naloxone, 0.2 mg, q5 min PRN  ondansetron, 4 mg, q4h PRN  oxyCODONE, 5 mg, q4h PRN  sodium chloride, 1 spray, 4x daily PRN  vancomycin, , Daily PRN        ALLERGY:  Allergies   Allergen Reactions    Dapagliflozin GI bleeding and Bleeding     UTI    Urinary tract infection    Empagliflozin Unknown    Myfortic [Mycophenolate Sodium] GI Upset    Topiramate Nausea Only and Nausea/vomiting    Latex Rash       LABS:  Results for orders placed or performed during the hospital encounter of 02/15/24 (from the past 24 hour(s))   CBC   Result Value Ref Range    WBC 4.6 4.4 - 11.3 x10*3/uL    nRBC 6.4 (H) 0.0 - 0.0 /100 WBCs    RBC 2.85 (L) 4.00 - 5.20 x10*6/uL    Hemoglobin 7.8 (L) 12.0 - 16.0 g/dL    Hematocrit 25.2 (L) 36.0 - 46.0 %    MCV 88 80 - 100 fL    MCH 27.4 26.0 - 34.0 pg    MCHC 31.0 (L) 32.0 - 36.0 g/dL    RDW 22.8 (H) 11.5 - 14.5 %    Platelets 65 (L) 150 - 450 x10*3/uL   Blood Gas Arterial Full Panel   Result Value Ref Range    POCT pH, Arterial 7.41 7.38 - 7.42 pH    POCT pCO2, Arterial 42 38 - 42 mm Hg    POCT pO2, Arterial 152 (H) 85 - 95 mm Hg    POCT SO2, Arterial 100 94 - 100 %    POCT Oxy Hemoglobin, Arterial 95.1 94.0 - 98.0 %    POCT Hematocrit Calculated, Arterial 33.0 (L) 36.0 - 46.0 %    POCT Sodium, Arterial 132 (L) 136 - 145 mmol/L    POCT Potassium, Arterial 4.4 3.5 - 5.3 mmol/L    POCT Chloride, Arterial 102 98 - 107 mmol/L    POCT Ionized Calcium, Arterial 1.05 (L) 1.10 - 1.33 mmol/L    POCT Glucose, Arterial 182 (H) 74 - 99 mg/dL    POCT Lactate, Arterial 0.4 0.4 - 2.0 mmol/L    POCT Base Excess, Arterial 1.7 -2.0 - 3.0 mmol/L    POCT HCO3 Calculated, Arterial 26.6 (H) 22.0 - 26.0 mmol/L    POCT Hemoglobin, Arterial 10.9 (L) 12.0 - 16.0 g/dL    POCT Anion Gap, Arterial 8 (L) 10 - 25 mmo/L    Patient Temperature 37.0 degrees Celsius    FiO2 30 %   POCT GLUCOSE   Result Value Ref Range    POCT Glucose 101 (H) 74 - 99 mg/dL   ECMO Arterial Blood Gas   Result Value Ref Range    POCT pH,  Arterial ECMO 7.27 Reference range not established pH    POCT pCO2, Arterial ECMO 65 Reference range not established mm Hg    POCT pO2,  Arterial ECMO 501 Reference range not established mm Hg    POCT SO2, Arterial ECMO 100 Reference range not established %    POCT Oxy Hemoglobin, Arterial ECMO 95.7 Reference range not established %    POCT Base Excess, Arterial ECMO 1.2 Reference range not established mmol/L    POCT HCO3 Calculated, Arterial ECMO 29.8 Reference range not established mmol/L    Patient Temperature 37.0 degrees Celsius    FiO2 30 %   Reticulocytes   Result Value Ref Range    Retic % 3.9 (H) 0.5 - 2.0 %    Retic Absolute 0.105 (H) 0.018 - 0.083 x10*6/uL    Reticulocyte Hemoglobin 31 28 - 38 pg    Immature Retic fraction 43.9 (H) <=16.0 %   Haptoglobin   Result Value Ref Range    Haptoglobin <10 (L) 37 - 246 mg/dL   CBC and Auto Differential   Result Value Ref Range    WBC 4.0 (L) 4.4 - 11.3 x10*3/uL    nRBC 5.4 (H) 0.0 - 0.0 /100 WBCs    RBC 2.68 (L) 4.00 - 5.20 x10*6/uL    Hemoglobin 7.6 (L) 12.0 - 16.0 g/dL    Hematocrit 24.0 (L) 36.0 - 46.0 %    MCV 90 80 - 100 fL    MCH 28.4 26.0 - 34.0 pg    MCHC 31.7 (L) 32.0 - 36.0 g/dL    RDW 23.2 (H) 11.5 - 14.5 %    Platelets 36 (LL) 150 - 450 x10*3/uL    Immature Granulocytes %, Automated 7.2 (H) 0.0 - 0.9 %    Immature Granulocytes Absolute, Automated 0.29 0.00 - 0.70 x10*3/uL   ECMO Venous Blood Gas   Result Value Ref Range    POCT pH, Venous ECMO 7.26 Reference range not established pH    POCT pCO2, Venous ECMO 65 Reference range not established mm Hg    POCT pO2,  Venous  ECMO 60 Reference range not established mm Hg    POCT SO2, Venous ECMO 95 Reference range not established %    POCT Oxy Hemoglobin, Venous ECMO 90.2 Reference range not established %    POCT Base Excess, Venous ECMO 0.5 Reference range not established mmol/L    POCT HCO3 Calculated, Venous ECMO 29.2 Reference range not established mmol/L    Patient Temperature 37.0 degrees Celsius     FiO2 30 %   Lactate Dehydrogenase   Result Value Ref Range    LDH 1,908 (H) 84 - 246 U/L   Calcium, Ionized   Result Value Ref Range    POCT Calcium, Ionized 0.97 (L) 1.1 - 1.33 mmol/L   Hepatic function panel   Result Value Ref Range    Albumin 4.5 3.4 - 5.0 g/dL    Bilirubin, Total 2.4 (H) 0.0 - 1.2 mg/dL    Bilirubin, Direct 1.3 (H) 0.0 - 0.3 mg/dL    Alkaline Phosphatase 177 (H) 33 - 110 U/L    ALT 21 7 - 45 U/L     (H) 9 - 39 U/L    Total Protein 6.0 (L) 6.4 - 8.2 g/dL   Coagulation Screen   Result Value Ref Range    Protime 13.6 (H) 9.8 - 12.8 seconds    INR 1.2 (H) 0.9 - 1.1    aPTT 31 27 - 38 seconds   Magnesium   Result Value Ref Range    Magnesium 2.45 (H) 1.60 - 2.40 mg/dL   Basic Metabolic Panel   Result Value Ref Range    Glucose 136 (H) 74 - 99 mg/dL    Sodium 138 136 - 145 mmol/L    Potassium 4.7 3.5 - 5.3 mmol/L    Chloride 102 98 - 107 mmol/L    Bicarbonate 27 21 - 32 mmol/L    Anion Gap 14 10 - 20 mmol/L    Urea Nitrogen 14 6 - 23 mg/dL    Creatinine 0.80 0.50 - 1.05 mg/dL    eGFR >90 >60 mL/min/1.73m*2    Calcium 8.1 (L) 8.6 - 10.6 mg/dL   Phosphorus   Result Value Ref Range    Phosphorus 3.8 2.5 - 4.9 mg/dL   Manual Differential   Result Value Ref Range    Neutrophils %, Manual 81.8 40.0 - 80.0 %    Lymphocytes %, Manual 11.3 13.0 - 44.0 %    Monocytes %, Manual 5.2 2.0 - 10.0 %    Eosinophils %, Manual 1.7 0.0 - 6.0 %    Basophils %, Manual 0.0 0.0 - 2.0 %    Seg Neutrophils Absolute, Manual 3.27 1.20 - 7.00 x10*3/uL    Lymphocytes Absolute, Manual 0.45 (L) 1.20 - 4.80 x10*3/uL    Monocytes Absolute, Manual 0.21 0.10 - 1.00 x10*3/uL    Eosinophils Absolute, Manual 0.07 0.00 - 0.70 x10*3/uL    Basophils Absolute, Manual 0.00 0.00 - 0.10 x10*3/uL    Total Cells Counted 115     RBC Morphology See Below     Hypochromia Mild     RBC Fragments Many    TSH   Result Value Ref Range    Thyroid Stimulating Hormone 10.13 (H) 0.27 - 4.20 mIU/L   T4, free   Result Value Ref Range    Thyroxine, Free  0.70 (L) 0.90 - 1.70 ng/dL   Blood Gas Arterial Full Panel   Result Value Ref Range    POCT pH, Arterial 7.46 (H) 7.38 - 7.42 pH    POCT pCO2, Arterial 36 (L) 38 - 42 mm Hg    POCT pO2, Arterial 165 (H) 85 - 95 mm Hg    POCT SO2, Arterial 100 94 - 100 %    POCT Oxy Hemoglobin, Arterial 95.5 94.0 - 98.0 %    POCT Hematocrit Calculated, Arterial 26.0 (L) 36.0 - 46.0 %    POCT Sodium, Arterial 135 (L) 136 - 145 mmol/L    POCT Potassium, Arterial 4.6 3.5 - 5.3 mmol/L    POCT Chloride, Arterial 107 98 - 107 mmol/L    POCT Ionized Calcium, Arterial 1.00 (L) 1.10 - 1.33 mmol/L    POCT Glucose, Arterial 125 (H) 74 - 99 mg/dL    POCT Lactate, Arterial 0.5 0.4 - 2.0 mmol/L    POCT Base Excess, Arterial 1.8 -2.0 - 3.0 mmol/L    POCT HCO3 Calculated, Arterial 25.6 22.0 - 26.0 mmol/L    POCT Hemoglobin, Arterial 8.8 (L) 12.0 - 16.0 g/dL    POCT Anion Gap, Arterial 7 (L) 10 - 25 mmo/L    Patient Temperature 37.0 degrees Celsius    FiO2 30 %   Tacrolimus level   Result Value Ref Range    Tacrolimus  <2.0 <=15.0 ng/mL   Blood Gas Arterial Full Panel   Result Value Ref Range    POCT pH, Arterial 7.45 (H) 7.38 - 7.42 pH    POCT pCO2, Arterial 38 38 - 42 mm Hg    POCT pO2, Arterial 208 (H) 85 - 95 mm Hg    POCT SO2, Arterial 100 94 - 100 %    POCT Oxy Hemoglobin, Arterial 95.8 94.0 - 98.0 %    POCT Hematocrit Calculated, Arterial 24.0 (L) 36.0 - 46.0 %    POCT Sodium, Arterial 135 (L) 136 - 145 mmol/L    POCT Potassium, Arterial 4.5 3.5 - 5.3 mmol/L    POCT Chloride, Arterial 106 98 - 107 mmol/L    POCT Ionized Calcium, Arterial 1.07 (L) 1.10 - 1.33 mmol/L    POCT Glucose, Arterial 164 (H) 74 - 99 mg/dL    POCT Lactate, Arterial 0.5 0.4 - 2.0 mmol/L    POCT Base Excess, Arterial 2.3 -2.0 - 3.0 mmol/L    POCT HCO3 Calculated, Arterial 26.4 (H) 22.0 - 26.0 mmol/L    POCT Hemoglobin, Arterial 8.0 (L) 12.0 - 16.0 g/dL    POCT Anion Gap, Arterial 7 (L) 10 - 25 mmo/L    Patient Temperature 37.0 degrees Celsius    FiO2 30 %   POCT GLUCOSE    Result Value Ref Range    POCT Glucose 140 (H) 74 - 99 mg/dL   Blood Gas Arterial Full Panel   Result Value Ref Range    POCT pH, Arterial 7.44 (H) 7.38 - 7.42 pH    POCT pCO2, Arterial 38 38 - 42 mm Hg    POCT pO2, Arterial 168 (H) 85 - 95 mm Hg    POCT SO2, Arterial 100 94 - 100 %    POCT Oxy Hemoglobin, Arterial 96.5 94.0 - 98.0 %    POCT Hematocrit Calculated, Arterial 23.0 (L) 36.0 - 46.0 %    POCT Sodium, Arterial 134 (L) 136 - 145 mmol/L    POCT Potassium, Arterial 4.7 3.5 - 5.3 mmol/L    POCT Chloride, Arterial 105 98 - 107 mmol/L    POCT Ionized Calcium, Arterial 1.07 (L) 1.10 - 1.33 mmol/L    POCT Glucose, Arterial 191 (H) 74 - 99 mg/dL    POCT Lactate, Arterial 0.6 0.4 - 2.0 mmol/L    POCT Base Excess, Arterial 1.6 -2.0 - 3.0 mmol/L    POCT HCO3 Calculated, Arterial 25.8 22.0 - 26.0 mmol/L    POCT Hemoglobin, Arterial 7.8 (L) 12.0 - 16.0 g/dL    POCT Anion Gap, Arterial 8 (L) 10 - 25 mmo/L    Patient Temperature 37.0 degrees Celsius    FiO2 30 %   Calcium, Ionized   Result Value Ref Range    POCT Calcium, Ionized 1.03 (L) 1.1 - 1.33 mmol/L   Magnesium   Result Value Ref Range    Magnesium 2.29 1.60 - 2.40 mg/dL   Renal Function Panel   Result Value Ref Range    Glucose 186 (H) 74 - 99 mg/dL    Sodium 137 136 - 145 mmol/L    Potassium 4.6 3.5 - 5.3 mmol/L    Chloride 102 98 - 107 mmol/L    Bicarbonate 25 21 - 32 mmol/L    Anion Gap 15 10 - 20 mmol/L    Urea Nitrogen 13 6 - 23 mg/dL    Creatinine 0.85 0.50 - 1.05 mg/dL    eGFR >90 >60 mL/min/1.73m*2    Calcium 8.3 (L) 8.6 - 10.6 mg/dL    Phosphorus 2.5 2.5 - 4.9 mg/dL    Albumin 4.5 3.4 - 5.0 g/dL   POCT GLUCOSE   Result Value Ref Range    POCT Glucose 175 (H) 74 - 99 mg/dL   Heparin Assay, UFH   Result Value Ref Range    Heparin Unfractionated <0.1 See Comment Below for Therapeutic Ranges IU/mL   Blood Gas Arterial Full Panel   Result Value Ref Range    POCT pH, Arterial 7.46 (H) 7.38 - 7.42 pH    POCT pCO2, Arterial 37 (L) 38 - 42 mm Hg    POCT pO2,  Arterial 290 (H) 85 - 95 mm Hg    POCT SO2, Arterial 100 94 - 100 %    POCT Oxy Hemoglobin, Arterial 96.1 94.0 - 98.0 %    POCT Hematocrit Calculated, Arterial 23.0 (L) 36.0 - 46.0 %    POCT Sodium, Arterial 135 (L) 136 - 145 mmol/L    POCT Potassium, Arterial 4.8 3.5 - 5.3 mmol/L    POCT Chloride, Arterial 106 98 - 107 mmol/L    POCT Ionized Calcium, Arterial 1.05 (L) 1.10 - 1.33 mmol/L    POCT Glucose, Arterial 150 (H) 74 - 99 mg/dL    POCT Lactate, Arterial 0.7 0.4 - 2.0 mmol/L    POCT Base Excess, Arterial 2.3 -2.0 - 3.0 mmol/L    POCT HCO3 Calculated, Arterial 26.3 (H) 22.0 - 26.0 mmol/L    POCT Hemoglobin, Arterial 7.6 (L) 12.0 - 16.0 g/dL    POCT Anion Gap, Arterial 8 (L) 10 - 25 mmo/L    Patient Temperature 37.0 degrees Celsius    FiO2 30 %     *Note: Due to a large number of results and/or encounters for the requested time period, some results have not been displayed. A complete set of results can be found in Results Review.        ASSESSMENT AND PLAN:    Ms. Yimi Bowles is a 34 y.o. female who underwent a kidney transplant surgery  on 3/31/2022 (Heart).    Principal Problem:    Cardiogenic shock (CMS/[facility])  Active Problems:    Heart transplant recipient (CMS/[facility])    ESRD (end stage renal disease) on dialysis (CMS/[facility])    HIRAM (acute kidney injury) (CMS/[facility])    Acute passive congestion of liver    Hyponatremia    Iron deficiency anemia    Abdominal pain    Cardiac transplant rejection (CMS/[facility])    Acute combined systolic (congestive) and diastolic (congestive) heart failure (CMS/[facility])      CRRT Management Note:    - Patient was seen and examined while on CVVH   - Lab was reviewed  - CVVH order was reviewed  -Discussed with primary team  -Discussed with RN   - Will continue CVVH for now  -Daily evaluation for indications for CVVH and will convert it to regular IHD once the patient is more hemodynamically stable.    Heart and Kidney transplant Candidacy :   Will meet the OPTN eligibility criteria for kidney  transplant on 3/29/24 for sustained HIRAM over 6 weeks. Pre tx work up - per heart tx team.   Note:  GFR 2/17/24=23  CVVH started 2/19 and has remained on CVVH still  She is approved for CANDIDATE PENDING HEART TRANSPLANT APPROVAL at kidney transplant selection committee 3/28/24    [Sustained acute kidney injury : At least one of the following, or a combination of both of the following, for the last 6 weeks:    That the candidate has been on dialysis at least once every 7 days.    That the candidate has a measured or calculated CrCl or GFR less than or equal to 25 mL/min at least once every 7 days]      * Case was discussed with primary team.  For questions, please contact transplant nephrology page x [phone]    Russ Bergeron    Transplant Nephrologist

## 2024-04-01 NOTE — PROGRESS NOTES
ECMO Note    Patient requires ECMO support. Drainage cannula site, cannula, pump, oxygenator, return cannula and return cannula site clinically inspected. RPM and sweep reviewed and adjusted appropriate to clinical context. Anticoagulation status and intensity discussed. Plan for weaning, next clinical steps discussed with team and family. Discussed with cardiothoracic surgeon, perfusion, palliative care and physical/occupational therapy    ECMO Indication: Cardiogenic shock; concern for acute rejection for cardiac transplantation  ECMO Cannula Configuration: R femoral VA  ECMO circuit run from drainage cannula to pump to oxygenator to return cannula: Completed  Presence of cannula bleeding: None  Presence of oxygenator clot: None  Pre/post PaO2 adequate: Appropriate step-up  LV Unloading/Pulse Pressure: Axillary Impella at P4  Distal Perfusion: DRC in place in RLE  Anticoagulation Plan/Monitoring: Begin low dose heparin given recent epistaxis  Mobility Plan/Candidacy: Continue bedrest for now but will discuss possible cannula optimization to promote additional mobility  Path to decannulation/anticipated decannulation date: Likely will require ongoing support; ongoing discussions regarding need for re-transplantation    ECMO:     Most Recent Range Past 24hrs   Flow 3.84 Patient Flow (L/min)  Min: 3.61  Max: 3.95   Speed 3164 Circuit Flow (RPM)  Min: 3164  Max: 3166   Sweep 0.5 Sweep Gas Flow Rate (L/min)  Min: 0.5  Max: 0.5       dexmedeTOMIDine, 0.1-1.5 mcg/kg/hr, Last Rate: 1 mcg/kg/hr (04/01/24 0859)  heparin, 100 Units/hr, Last Rate: 100 Units/hr (04/01/24 0921)  lactated Ringer's, 5 mL/hr, Last Rate: 5 mL/hr (04/01/24 0836)  PrismaSol 4/2.5, 25 mL/kg/hr  sodium bicarbonate infusion Impella Purge 25 mEq/1000 mL D5W, 10 mL/hr, Last Rate: 10 mL/hr (04/01/24 0836)        Vent Mode: Pressure support  FiO2 (%):  [30 %] 30 %  S RR:  [12] 12  S VT:  [350 mL] 350 mL  PEEP/CPAP (cm H2O):  [5 cm H20-10 cm H20] 5 cm  H20  MO SUP:  [5 cm H20-8 cm H20] 5 cm H20  MAP (cm H2O):  [9.9-13] 12

## 2024-04-01 NOTE — NURSING NOTE
Multidisciplinary ECMO Rounds Note    Attendance:  CT Surgeon: No  CTICU Attending: Dr. Lee  Palliative Care Attending: Dr. Chaudhary  Physical Therapy Present (Y/N): N  Perfusion Present (Y/N): N    ECMO Indication: Cardiogenic shock  ECMO Cannula Configuration: Right Fem Fem VA    Changes made to Hemodynamic/Ventilator/ECMO Management: Patient has tolerated wean of impella to P4 and today has shown slight improvement in her LDH. She is currently on CPAP this afternoon and the goal is to be extubated by this evening. This afternoon she has become more hypotensive. Patient will be pan cultured to rule out infection    Circuit Concerns: None at this time  Bleeding Concerns: None at this time  Clotting/Ischemia Concerns: None at this time  Anticoagulation Plan/Monitoring: Patient was started on low dose systemic heparin and 100units/hr. Plan is to get patient extubated and start increase heparin for a therapeutic assay if no s/s of bleeding are shown.     Mobility Plan/Candidacy: not at this time  Nutrition: Tube feed  Patient/Family Concerns: Family has been updated by heart failure and Cticu teams. They are aware of meeting tomorrow to discuss whether or not she can eventually be re listed for heart/kidney tx.  Nursing Concerns: none at this time    Patients Minimally Acceptable Outcome: JOSE  Barriers to decannulation/anticipated decannulation date:The goal is to optimize patient and get her extubated so that mobility can resume. As of now her ecmo configuration is less than ideal for mobility. If patient's ecmo configuration can be changed she can become better optimized. This topic will be discussed tomorrow at the transplant meeting.     ECMO:     Most Recent Range Past 24hrs   Flow 3.92 Patient Flow (L/min)  Min: 3.61  Max: 3.95   Speed 3165 Circuit Flow (RPM)  Min: 3164  Max: 3166   Sweep 0.5 Sweep Gas Flow Rate (L/min)  Min: 0.5  Max: 0.5       dexmedeTOMIDine, 0.1-1.5 mcg/kg/hr, Last Rate: Stopped (04/01/24  1434)  heparin, 100 Units/hr, Last Rate: 100 Units/hr (04/01/24 0921)  lactated Ringer's, 5 mL/hr, Last Rate: 5 mL/hr (04/01/24 0836)  PrismaSol 4/2.5, 25 mL/kg/hr  sodium bicarbonate infusion Impella Purge 25 mEq/1000 mL D5W, 10 mL/hr, Last Rate: 10 mL/hr (04/01/24 0836)          Vent Mode: Pressure support  FiO2 (%):  [30 %] 30 %  S RR:  [12] 12  S VT:  [350 mL] 350 mL  PEEP/CPAP (cm H2O):  [5 cm H20-10 cm H20] 5 cm H20  ID SUP:  [5 cm H20-8 cm H20] 5 cm H20  MAP (cm H2O):  [9.9-13] 12

## 2024-04-01 NOTE — PROGRESS NOTES
CTICU Progress Note  Charla Bowles/[id number]    Admit Date: 2/15/2024  Hospital Length of Stay: 46   ICU Length of Stay: 4d 12h   CT SURGEON:     SUBJECTIVE:   Plts downtrending  Off heparin purge with some nasal bleeding and thrombocytopenia  Net negative 700  TF advanced    MEDICATIONS  Infusions:  dexmedeTOMIDine, Last Rate: 1 mcg/kg/hr (04/01/24 0600)  lactated Ringer's, Last Rate: 5 mL/hr (04/01/24 0600)  PrismaSol 4/2.5  sodium bicarbonate infusion Impella Purge 25 mEq/1000 mL D5W, Last Rate: 10 mL/hr (04/01/24 0600)      Scheduled:  acetaminophen, 650 mg, q4h  albumin human, 25 g, q6h  calcium carbonate-vitamin D3, 1 tablet, Daily  cephalexin, 500 mg, q12h TRICIA  cyanocobalamin, 500 mcg, Daily  darbepoetin floyd, 100 mcg, q14 days  ferrous sulfate (325 mg ferrous sulfate), 65 mg of iron, Daily with breakfast  folic acid, 1 mg, Daily  hydrALAZINE, 10 mg, q8h  insulin lispro, 0-15 Units, q4h  levothyroxine, 200 mcg, Daily  lidocaine, 1 patch, Daily  multivitamin with iron-minerals, 15 mL, Daily  multivitamin with minerals, 1 tablet, Daily  [Held by provider] mycophenolate, 180 mg, BID  nystatin, 5 mL, q6h  oxygen, , Continuous - Inhalation  pantoprazole, 40 mg, Daily  polyethylene glycol, 17 g, BID  potassium, sodium phosphates, 2 packet, q8h  predniSONE, 10 mg, Daily  sennosides-docusate sodium, 2 tablet, BID  [Held by provider] sulfamethoxazole-trimethoprim, 80 mg of trimethoprim, Daily  tacrolimus, 1.5 mg, q24h  tacrolimus, 1.5 mg, q24h  traZODone, 25 mg, Nightly  valGANciclovir, 450 mg, q48h      PRN:  bisacodyl, 10 mg, Daily PRN  calcium gluconate, 1 g, q6h PRN  calcium gluconate, 2 g, q6h PRN  dextrose, 25 g, q15 min PRN  dextrose, 25 g, q15 min PRN   Or  glucagon, 1 mg, q15 min PRN  dextrose, 25 g, q15 min PRN   Or  glucagon, 1 mg, q15 min PRN  glucagon, 1 mg, q15 min PRN  hydrALAZINE, 10 mg, q4h PRN  HYDROmorphone, 0.2 mg, q4h PRN  artificial tears, 2 drop, PRN  magnesium sulfate, 2 g, q6h  "PRN  magnesium sulfate, 4 g, q6h PRN  microfibrllar collagen, , PRN  mupirocin, , BID PRN  naloxone, 0.2 mg, q5 min PRN  ondansetron, 4 mg, q4h PRN  oxyCODONE, 5 mg, q4h PRN  oxymetazoline, 2 spray, q12h PRN  sodium chloride, 1 spray, 4x daily PRN        PHYSICAL EXAM:   Visit Vitals  BP 78/74   Pulse 76   Temp 36.5 °C (97.7 °F) (Temporal)   Resp 12   Ht 1.549 m (5' 0.98\")   Wt 90.3 kg (199 lb 1.2 oz)   SpO2 100%   BMI 37.63 kg/m²   OB Status Hysterectomy   Smoking Status Never   BSA 1.97 m²     Wt Readings from Last 5 Encounters:   04/01/24 90.3 kg (199 lb 1.2 oz)   12/07/23 92.1 kg (203 lb)   12/01/23 93 kg (205 lb)   11/29/23 92.9 kg (204 lb 12.8 oz)   11/09/23 91.3 kg (201 lb 3.2 oz)     INTAKE/OUTPUT:  I/O last 3 completed shifts:  In: 3472.8 (38.5 mL/kg) [I.V.:1239.8 (13.7 mL/kg); Blood:483.1; NG/GT:1050; IV Piggyback:700]  Out: 5865 (65 mL/kg) [Other:5865]  Weight: 90.3 kg          Vent settings:  Vent Mode: Volume control/assist control  FiO2 (%):  [30 %] 30 %  S RR:  [12] 12  S VT:  [350 mL] 350 mL  PEEP/CPAP (cm H2O):  [10 cm H20] 10 cm H20  PA SUP:  [10 cm H20] 10 cm H20  MAP (cm H2O):  [12-13] 12    Physical Exam:   - CONSTITUTION: young appearing female intubated on ECMO with impella  - NEUROLOGIC: More interactive; intermittently CAM positive. Following in all extremities  - CARDIOVASCULAR: NSR. R fem VA ECMO, no bleeding at site. R axillary impella without bleeding. Monophasic signals in LE  - RESPIRATORY: Intubated.  Course breath sounds. Minimal ETT secretions. Some thin, pink oral secretions now  - GI: soft, hypoactive BS  - : anuric  - EXTREMITIES: all extremities warm. Edema improving  - SKIN: left groin area of breakdown  - PSYCHIATRIC: JOSE    Daily Risk Screen  Intubated: plan to attempt to wean to extubate if pulm edema improves  Central line: access  Singh: n/s    Images:     Invasive Hemodynamics:    Most Recent Range Past 24hrs   BP (Art) 78/75 Arterial Line BP 1  Min: 65/58  Max: 86/82 "   MAP(Art) 77 mmHg Arterial Line MAP 1 (mmHg)   Min: 60 mmHg  Max: 84 mmHg   RA/CVP   No data recorded   PA 41/34 No data recorded   PA(mean) 37 mmHg No data recorded   CO 5.5 L/min No data recorded   CI 2.8 L/min/m2 No data recorded   Mixed Venous 95 % SVO2 (%)  Min: 85.4 %  Max: 99.6 %   SVR  1115 (dyne*sec)/cm5 No data recorded         Assessment/Plan   34F with a PMHx sig for stage D HFrEF (PPCM) s/p ICD s/p CardioMEMs, hypothyroidism 2/2 thyroidectomy, obesity s/p gastric bypass (2017), and SLE who is s/p OHT (3/31/2022) with a post-OHT course complicated by RIJ/RUE DVTs, leukopenia, left groin seroma, and asymptomatic 2R ACR (11/2022) treated with steroids, s/p total hysterectomy (2023), Flu A (1/2024).     Originally presented to [facility] ED on 2/15/24 with complaints of N/V x 3 days and inability to keep medications down. Found to have acute systolic heart failure with primary graft failure and cardiogenic shock. Treated for suspected acute cellular vs. acute antibody mediated rejection; however, multiple cardiac biopsies negative for signs of rejection or other causes of graft failure. Course has been complicated by multiple MCS devices for refractory cardiogenic shock (right femoral IABP: 2/18/24 - 3/1/24, Left femoral VA ECMO: 2/19/24 - 2/29/24, Right axillary impella 5.5: 3/1/24 - remains in place, 2nd right femoral VA ECMO: 3/27/24 - remains in place), ARF requiring RRT, acute liver injury, intubation due to volume overload in setting of pulmonary edema, severe hemolysis 2/2 to impella malposition, mild CMV viremia, bilateral provoked IJ DVTs, multiple episodes of epistaxis & coagulopathies requiring ongoing blood product transfusions.        Transferred to CTICU on 3/27/24 following right femoral VA ECMO placement due to worsening cardiogenic shock and multi-organ failure despite Impella 5.5 support and multiple inotropes.       Plan:  NEURO: No history. Previously neuro intact with acute pain and  intermittent insomnia. Currently intubated and sedated on precedex infusion and following commands and more interactive. Intermittently CAM +.  ->  - Serial neuro and pain assessments  - continue precedex  - continue tylenol scheduled but will stop again if LFT uptrend.  - continue lidoderm patch for back pain  - Continue oxy to 5mg Q4 PRN.   - continue dilaudid prn for now while intubated  - continue trazodone for sleep   - PT/OT Consult  - CAM ICU score qshift. Sleep/wake cycle hygiene     ENT: Multiple episodes of epistaxis with interventions by ENT.  Most recently in OR 3/27 with L nare packed.  Small amount of bleeding noted yesterday, improved with a dose of Afrin.  - appreciate ENT recs  - keflex while packing in place  - Prn ocean spray and mupiricin for dry nasal passages  - afrin PRN     Cardiac: Patient has a history of heart transplant in March of 2022 with primary graft dysfunction treated for acute cellular and antibody rejection without improvement with negative biopsy with multiple MCS devices for refractory cardiogenic shock (right femoral IABP: 2/18/24 - 3/1/24, Left femoral VA ECMO: 2/19/24 - 2/29/24, Right axillary impella 5.5: 3/1/24 - remains in place, 2nd right femoral VA ECMO: 3/27/24 - remains in place). Elevated LDH improved today on P4. Multiple attempts at repositioning impella previously.  ECHO 3/29 with impella potentially deep but not adjusted again. ECMO ~3.7L flow/100%/sweep 0.5, impella 5.5 P4 with flows ~1.8L with resolved lactate and improving end organ function. Decreased impella flows when afterload higher. NSR and normotensive.   -->  - Maintain goal MAP 70-90  - continue full BiV support with impella and ECMO. No plan for attempts at weaning. Will be presented tomorrow for kidney/heart transplant  - Continue to hold home rosuvastatin 40mg daily with elevated LFTs  - continue PO hydralazine 10mg q8h with hold parameter  - continue as needed hydralazine for MAP >90  - continue  to trend LDH  - Hold home amlodipine    Vascular: 3/27 arterial duplex with proximal SFA occlusin with collateral flow. C/f LLE ischemia (previous ECMO site) with loss of pulses- now monophasic with CK that had been normal and no longer trending. Feet warm with intact motor exam.   - appreciate vascular surgery following     PULM: No history. Intubated multiple times throughout hospital course for procedures; intubated 3/27 for OR. Again with acute respiratory failure persisting due to acute pulmonary edema which is now improving after aggressive volume removal. Appropriate peak and plateau pressures on ACVC with peep 10.  Tolerated pressure support trial 3/30 but apneic repeatedly 3/31.  ECMO sweep 0.5, FIO2 100% with improved respiratory alkalosis. -->  - CXR daily  - continue goal negtive  - pressure support trial and wean PEEP to 8  - wean sweep and minute ventilation on vent for normal pH  - Maintain SPO2 > 92%     GI: History of gastric bypass and MMF colitis. Shock liver originally resolved; most recently elevated r/t likely congestive hepatopathy that is improving on ECMO. Abdominal pain improved with reduced myfortic dosing. Previous c/f GI bleed, likely blood from epistaxis; no current evidence of GI bleeding. H pylori negative, fecal calprotectin (elevated at 380), fecal lactoferrin (Positive 03/23/24). Previously on oral diet. OG in place at present TF advancing and had multiple BMs. -->  - Continue calcitriol 0.5mg daily, multivitamin, calcium carbonate 1,250mg BID  - continue PPI  - avoiding placing dobhoff with epistaxis.   - advance TF to goal  - continue miralax BID & senna-colace BID with as needed suppository    : No history, baseline Cr 1.2-1.3. HIRAM originally on CVVH then with renal recovery but then again worsened and now dialysis dependent and failed diuretic challenges. Hypervolemia improving with aggressive volume removal. Persisting hypophosphatemia despite repeated supplementation.  -->  - RFP as clinically indicated, replete electrolytes per CTICU protocol.   - continue CVVH for net negative  - increase schedule enteral phos for hypophosphatemia  - Nephrology consult     ENDO: History of hypothyroidism and prediabetes. TSH elevated originally to malabsorption then with dosing adjusted by endo; TSH (3/17): 20.74, T4 1.11, T3: 1.4. Steroid induced hyperglycemia acceptable glycemic control on SSI. -->  - Maintain BG <180 with hypoglycemia protocol  - Continue SSI  - Continue Synthroid to 200mcg daily.   - Endocrine following, will touch base today if any further monitoring needed     HEME: History of iron deficiency anemia and remote DVT; again with acute DVT LIJ and SVT left cephalic vein and right cephalic vein. Acute blood loss anemia with repeated transfusions with significant hemolysis but no evidence of active bleeding; last received pRBC 3/30. Thrombocytopenia persisting and again downtrending after 5pk yesterday; last PF4 negative 3/30.  Heparin purge stopped in setting of ACT >200 with some small epistaxis with thrombocytopenia. No epistaxis today.  -->    - 5 pk plts when plts <30K  - Continue ferrous sulfate daily  - holding ASA for CAD with thrombocytopenia  - continue bicarb purge  - starting systemic heparin at 100units/hr and evaluate response. High risk with DVT and SFA occlusion but high risk for bleeding.   - SCDs and for DVT prophylaxis  - Aranesp every 2 weeks  - Last type and screen: 3/31     Rejection/prophylaxis: S/p heart transplant 3/31/2022. Induction basiliximab. Donor HCV -, toxo -/-, CMV -/+. Mild ACR with +CD4 and negative HLAs however clinical presentation concern for acute cellular vs AMR rejection/stuttering rejection completed courses of thymo/PLEX/IVIG without clinical improvement.   - Steroids: Continue 10mg PO prednisone daily  - Tacrolimus: 1.5mg/1.5mg w/ daily levels drawn @ 0600  - Tacrolimus goal troughs: 8-10  - Myfortic acid: 180mg BID, holding since  unable to crush.  Not starting MMF d/t hx of colitis  - Antifungals: nystatin 500,000units Q6  - Antivirals: valcyte 450mg Q48hrs   - Anti PCP & Toxoplasmosis: holding Bactrim SS daily due to thrombocytopenia     ID: Afebrile. C/f previous yeast infection. BC 3/27 NGTD final. MRSA screen negative 3/28.   -->  - Trend temp q4h  - continue keflex while nasal packing in place      Skin: Left groin wound from previous ecmo with wound consulted. Wound cx with NG 3/15.   - Preventative Mepilex dressings in place on sacrum and heels  - Change preventative Mepilex weekly or more frequently as indicated (when moist/soiled)   - Every shift skin assessment per nursing and weekly ICU skin rounds  - Moisture barrier to be applied with christopher care  - Active skin problems addressed with nursing on daily rounds  - wound recs from note 3/15 ordered      Proph:  SCDs  Resuming low systemic heparin  PPI     G: Lines  RIJ Trialysis - 3/5. Limited options for replacement with DVTs.    R radial maxwell 3/27  PIV x2     Restraints: The indications and risks/benefits of non-violent/non self-destructive restraints were discussed and are indicated for the safety of the patient.     Code status: Full Code     I personally spent 60 minutes of critical care time directly and personally managing the patient exclusive of separately billable procedures.     A,B,C,D,E,F,G: reviewed     Discussed with Dr. Lee  Dispo: CTICU care for now.    CTICU TEAM PHONE [id number]

## 2024-04-01 NOTE — PROGRESS NOTES
"        INPATIENT TRANSPLANT NEPHROLOGY PROGRESS NOTE          REASON FOR CONSULT:  Immunosuppressive medication management and nephrology related issues.    SUBJECTION:  CVVH Procedure note    ECMO  On pressors  Tolerated CVVH well    No acute event overnight.    PHYCISCAL EXAMINATION:    Visit Vitals  BP 78/74   Pulse 88   Temp 36 °C (96.8 °F) (Axillary)   Resp 21   Ht 1.549 m (5' 0.98\")   Wt 90.3 kg (199 lb 1.2 oz)   SpO2 92%   BMI 37.63 kg/m²   OB Status Hysterectomy   Smoking Status Never   BSA 1.97 m²        03/30 1900 - 04/01 0659  In: 3472.8 [I.V.:1239.8]  Out: 5865      Weight change:     Constitutional:       General: She is not in acute distress.     Appearance: Normal appearance. She is ill-appearing and toxic-appearing.   HENT:      Head: Normocephalic.      Mouth/Throat:      Mouth: Mucous membranes are moist.   Eyes:      Extraocular Movements: Extraocular movements intact.      Pupils: Pupils are equal, round, and reactive to light.   Neck:      Comments: RIJ dialysis line present - CDI  Cardiovascular:      Rate and Rhythm: Regular rhythm. Tachycardia present.      Pulses: Normal pulses.      Heart sounds: Normal heart sounds.   Pulmonary:      Effort: Pulmonary effort is normal. No respiratory distress.      Breath sounds: Normal breath sounds.   Chest:      Comments: Right axillary impella site CDI   Abdominal:      General: Bowel sounds are normal.      Palpations: Abdomen is soft.      Tenderness: There is no abdominal tenderness.   Musculoskeletal:         General: Normal range of motion.      Cervical back: Normal range of motion.      Right lower leg: Edema present.      Left lower leg: Edema present.   Skin:     General: Skin is warm and dry.      Capillary Refill: Capillary refill takes 2 to 3 seconds.      Coloration: Skin is jaundiced.      Comments: Left groin ECMO cannulation site with drainage    Neurological:      General: No focal deficit present.      Mental Status: She is alert and " oriented to person, place, and time.   Psychiatric:         Behavior: Behavior normal. Behavior is cooperative.     MEDICATION LIST: REVIEWED    acetaminophen, 650 mg, q6h  calcium carbonate-vitamin D3, 1 tablet, Daily  [Held by provider] cephalexin, 500 mg, q12h TRICIA  cyanocobalamin, 500 mcg, Daily  darbepoetin floyd, 100 mcg, q14 days  ferrous sulfate (325 mg ferrous sulfate), 65 mg of iron, Daily with breakfast  folic acid, 1 mg, Daily  insulin lispro, 0-15 Units, q4h  levothyroxine, 200 mcg, Daily  lidocaine, 1 patch, Daily  multivitamin with iron-minerals, 15 mL, Daily  multivitamin with minerals, 1 tablet, Daily  [Held by provider] mycophenolate, 180 mg, BID  nystatin, 5 mL, q6h  oxygen, , Continuous - Inhalation  oxymetazoline, 2 spray, q12h  pantoprazole, 40 mg, Daily  piperacillin-tazobactam, 4.5 g, q6h  polyethylene glycol, 17 g, BID  potassium, sodium phosphates, 2 packet, q8h  predniSONE, 10 mg, Daily  sennosides-docusate sodium, 2 tablet, BID  sodium chloride, 1 spray, 4x daily  [Held by provider] sulfamethoxazole-trimethoprim, 80 mg of trimethoprim, Daily  [START ON 4/2/2024] tacrolimus, 3 mg, q24h  tacrolimus, 3 mg, q24h  traZODone, 25 mg, Nightly  valGANciclovir, 450 mg, q48h  vancomycin, 1,000 mg, Once   Followed by  vancomycin, 1,000 mg, Once      dexmedeTOMIDine, Last Rate: Stopped (04/01/24 1434)  heparin, Last Rate: 100 Units/hr (04/01/24 0921)  lactated Ringer's, Last Rate: 5 mL/hr (04/01/24 0836)  PrismaSol 4/2.5  sodium bicarbonate infusion Impella Purge 25 mEq/1000 mL D5W, Last Rate: 10 mL/hr (04/01/24 0836)      bisacodyl, 10 mg, Daily PRN  calcium gluconate, 1 g, q6h PRN  calcium gluconate, 2 g, q6h PRN  dextrose, 25 g, q15 min PRN  dextrose, 25 g, q15 min PRN   Or  glucagon, 1 mg, q15 min PRN  hydrALAZINE, 10 mg, q4h PRN  HYDROmorphone, 0.2 mg, q4h PRN  artificial tears, 2 drop, PRN  magnesium sulfate, 2 g, q6h PRN  magnesium sulfate, 4 g, q6h PRN  microfibrllar collagen, , PRN  mupirocin,  , BID PRN  naloxone, 0.2 mg, q5 min PRN  ondansetron, 4 mg, q4h PRN  oxyCODONE, 5 mg, q4h PRN  sodium chloride, 1 spray, 4x daily PRN  vancomycin, , Daily PRN        ALLERGY:  Allergies   Allergen Reactions    Dapagliflozin GI bleeding and Bleeding     UTI    Urinary tract infection    Empagliflozin Unknown    Myfortic [Mycophenolate Sodium] GI Upset    Topiramate Nausea Only and Nausea/vomiting    Latex Rash       LABS:  Results for orders placed or performed during the hospital encounter of 02/15/24 (from the past 24 hour(s))   CBC   Result Value Ref Range    WBC 4.6 4.4 - 11.3 x10*3/uL    nRBC 6.4 (H) 0.0 - 0.0 /100 WBCs    RBC 2.85 (L) 4.00 - 5.20 x10*6/uL    Hemoglobin 7.8 (L) 12.0 - 16.0 g/dL    Hematocrit 25.2 (L) 36.0 - 46.0 %    MCV 88 80 - 100 fL    MCH 27.4 26.0 - 34.0 pg    MCHC 31.0 (L) 32.0 - 36.0 g/dL    RDW 22.8 (H) 11.5 - 14.5 %    Platelets 65 (L) 150 - 450 x10*3/uL   Blood Gas Arterial Full Panel   Result Value Ref Range    POCT pH, Arterial 7.41 7.38 - 7.42 pH    POCT pCO2, Arterial 42 38 - 42 mm Hg    POCT pO2, Arterial 152 (H) 85 - 95 mm Hg    POCT SO2, Arterial 100 94 - 100 %    POCT Oxy Hemoglobin, Arterial 95.1 94.0 - 98.0 %    POCT Hematocrit Calculated, Arterial 33.0 (L) 36.0 - 46.0 %    POCT Sodium, Arterial 132 (L) 136 - 145 mmol/L    POCT Potassium, Arterial 4.4 3.5 - 5.3 mmol/L    POCT Chloride, Arterial 102 98 - 107 mmol/L    POCT Ionized Calcium, Arterial 1.05 (L) 1.10 - 1.33 mmol/L    POCT Glucose, Arterial 182 (H) 74 - 99 mg/dL    POCT Lactate, Arterial 0.4 0.4 - 2.0 mmol/L    POCT Base Excess, Arterial 1.7 -2.0 - 3.0 mmol/L    POCT HCO3 Calculated, Arterial 26.6 (H) 22.0 - 26.0 mmol/L    POCT Hemoglobin, Arterial 10.9 (L) 12.0 - 16.0 g/dL    POCT Anion Gap, Arterial 8 (L) 10 - 25 mmo/L    Patient Temperature 37.0 degrees Celsius    FiO2 30 %   POCT GLUCOSE   Result Value Ref Range    POCT Glucose 101 (H) 74 - 99 mg/dL   ECMO Arterial Blood Gas   Result Value Ref Range    POCT pH,  Arterial ECMO 7.27 Reference range not established pH    POCT pCO2, Arterial ECMO 65 Reference range not established mm Hg    POCT pO2,  Arterial ECMO 501 Reference range not established mm Hg    POCT SO2, Arterial ECMO 100 Reference range not established %    POCT Oxy Hemoglobin, Arterial ECMO 95.7 Reference range not established %    POCT Base Excess, Arterial ECMO 1.2 Reference range not established mmol/L    POCT HCO3 Calculated, Arterial ECMO 29.8 Reference range not established mmol/L    Patient Temperature 37.0 degrees Celsius    FiO2 30 %   Reticulocytes   Result Value Ref Range    Retic % 3.9 (H) 0.5 - 2.0 %    Retic Absolute 0.105 (H) 0.018 - 0.083 x10*6/uL    Reticulocyte Hemoglobin 31 28 - 38 pg    Immature Retic fraction 43.9 (H) <=16.0 %   Haptoglobin   Result Value Ref Range    Haptoglobin <10 (L) 37 - 246 mg/dL   CBC and Auto Differential   Result Value Ref Range    WBC 4.0 (L) 4.4 - 11.3 x10*3/uL    nRBC 5.4 (H) 0.0 - 0.0 /100 WBCs    RBC 2.68 (L) 4.00 - 5.20 x10*6/uL    Hemoglobin 7.6 (L) 12.0 - 16.0 g/dL    Hematocrit 24.0 (L) 36.0 - 46.0 %    MCV 90 80 - 100 fL    MCH 28.4 26.0 - 34.0 pg    MCHC 31.7 (L) 32.0 - 36.0 g/dL    RDW 23.2 (H) 11.5 - 14.5 %    Platelets 36 (LL) 150 - 450 x10*3/uL    Immature Granulocytes %, Automated 7.2 (H) 0.0 - 0.9 %    Immature Granulocytes Absolute, Automated 0.29 0.00 - 0.70 x10*3/uL   ECMO Venous Blood Gas   Result Value Ref Range    POCT pH, Venous ECMO 7.26 Reference range not established pH    POCT pCO2, Venous ECMO 65 Reference range not established mm Hg    POCT pO2,  Venous  ECMO 60 Reference range not established mm Hg    POCT SO2, Venous ECMO 95 Reference range not established %    POCT Oxy Hemoglobin, Venous ECMO 90.2 Reference range not established %    POCT Base Excess, Venous ECMO 0.5 Reference range not established mmol/L    POCT HCO3 Calculated, Venous ECMO 29.2 Reference range not established mmol/L    Patient Temperature 37.0 degrees Celsius     FiO2 30 %   Lactate Dehydrogenase   Result Value Ref Range    LDH 1,908 (H) 84 - 246 U/L   Calcium, Ionized   Result Value Ref Range    POCT Calcium, Ionized 0.97 (L) 1.1 - 1.33 mmol/L   Hepatic function panel   Result Value Ref Range    Albumin 4.5 3.4 - 5.0 g/dL    Bilirubin, Total 2.4 (H) 0.0 - 1.2 mg/dL    Bilirubin, Direct 1.3 (H) 0.0 - 0.3 mg/dL    Alkaline Phosphatase 177 (H) 33 - 110 U/L    ALT 21 7 - 45 U/L     (H) 9 - 39 U/L    Total Protein 6.0 (L) 6.4 - 8.2 g/dL   Coagulation Screen   Result Value Ref Range    Protime 13.6 (H) 9.8 - 12.8 seconds    INR 1.2 (H) 0.9 - 1.1    aPTT 31 27 - 38 seconds   Magnesium   Result Value Ref Range    Magnesium 2.45 (H) 1.60 - 2.40 mg/dL   Basic Metabolic Panel   Result Value Ref Range    Glucose 136 (H) 74 - 99 mg/dL    Sodium 138 136 - 145 mmol/L    Potassium 4.7 3.5 - 5.3 mmol/L    Chloride 102 98 - 107 mmol/L    Bicarbonate 27 21 - 32 mmol/L    Anion Gap 14 10 - 20 mmol/L    Urea Nitrogen 14 6 - 23 mg/dL    Creatinine 0.80 0.50 - 1.05 mg/dL    eGFR >90 >60 mL/min/1.73m*2    Calcium 8.1 (L) 8.6 - 10.6 mg/dL   Phosphorus   Result Value Ref Range    Phosphorus 3.8 2.5 - 4.9 mg/dL   Manual Differential   Result Value Ref Range    Neutrophils %, Manual 81.8 40.0 - 80.0 %    Lymphocytes %, Manual 11.3 13.0 - 44.0 %    Monocytes %, Manual 5.2 2.0 - 10.0 %    Eosinophils %, Manual 1.7 0.0 - 6.0 %    Basophils %, Manual 0.0 0.0 - 2.0 %    Seg Neutrophils Absolute, Manual 3.27 1.20 - 7.00 x10*3/uL    Lymphocytes Absolute, Manual 0.45 (L) 1.20 - 4.80 x10*3/uL    Monocytes Absolute, Manual 0.21 0.10 - 1.00 x10*3/uL    Eosinophils Absolute, Manual 0.07 0.00 - 0.70 x10*3/uL    Basophils Absolute, Manual 0.00 0.00 - 0.10 x10*3/uL    Total Cells Counted 115     RBC Morphology See Below     Hypochromia Mild     RBC Fragments Many    TSH   Result Value Ref Range    Thyroid Stimulating Hormone 10.13 (H) 0.27 - 4.20 mIU/L   T4, free   Result Value Ref Range    Thyroxine, Free  0.70 (L) 0.90 - 1.70 ng/dL   Blood Gas Arterial Full Panel   Result Value Ref Range    POCT pH, Arterial 7.46 (H) 7.38 - 7.42 pH    POCT pCO2, Arterial 36 (L) 38 - 42 mm Hg    POCT pO2, Arterial 165 (H) 85 - 95 mm Hg    POCT SO2, Arterial 100 94 - 100 %    POCT Oxy Hemoglobin, Arterial 95.5 94.0 - 98.0 %    POCT Hematocrit Calculated, Arterial 26.0 (L) 36.0 - 46.0 %    POCT Sodium, Arterial 135 (L) 136 - 145 mmol/L    POCT Potassium, Arterial 4.6 3.5 - 5.3 mmol/L    POCT Chloride, Arterial 107 98 - 107 mmol/L    POCT Ionized Calcium, Arterial 1.00 (L) 1.10 - 1.33 mmol/L    POCT Glucose, Arterial 125 (H) 74 - 99 mg/dL    POCT Lactate, Arterial 0.5 0.4 - 2.0 mmol/L    POCT Base Excess, Arterial 1.8 -2.0 - 3.0 mmol/L    POCT HCO3 Calculated, Arterial 25.6 22.0 - 26.0 mmol/L    POCT Hemoglobin, Arterial 8.8 (L) 12.0 - 16.0 g/dL    POCT Anion Gap, Arterial 7 (L) 10 - 25 mmo/L    Patient Temperature 37.0 degrees Celsius    FiO2 30 %   Tacrolimus level   Result Value Ref Range    Tacrolimus  <2.0 <=15.0 ng/mL   Blood Gas Arterial Full Panel   Result Value Ref Range    POCT pH, Arterial 7.45 (H) 7.38 - 7.42 pH    POCT pCO2, Arterial 38 38 - 42 mm Hg    POCT pO2, Arterial 208 (H) 85 - 95 mm Hg    POCT SO2, Arterial 100 94 - 100 %    POCT Oxy Hemoglobin, Arterial 95.8 94.0 - 98.0 %    POCT Hematocrit Calculated, Arterial 24.0 (L) 36.0 - 46.0 %    POCT Sodium, Arterial 135 (L) 136 - 145 mmol/L    POCT Potassium, Arterial 4.5 3.5 - 5.3 mmol/L    POCT Chloride, Arterial 106 98 - 107 mmol/L    POCT Ionized Calcium, Arterial 1.07 (L) 1.10 - 1.33 mmol/L    POCT Glucose, Arterial 164 (H) 74 - 99 mg/dL    POCT Lactate, Arterial 0.5 0.4 - 2.0 mmol/L    POCT Base Excess, Arterial 2.3 -2.0 - 3.0 mmol/L    POCT HCO3 Calculated, Arterial 26.4 (H) 22.0 - 26.0 mmol/L    POCT Hemoglobin, Arterial 8.0 (L) 12.0 - 16.0 g/dL    POCT Anion Gap, Arterial 7 (L) 10 - 25 mmo/L    Patient Temperature 37.0 degrees Celsius    FiO2 30 %   POCT GLUCOSE    Result Value Ref Range    POCT Glucose 140 (H) 74 - 99 mg/dL   Blood Gas Arterial Full Panel   Result Value Ref Range    POCT pH, Arterial 7.44 (H) 7.38 - 7.42 pH    POCT pCO2, Arterial 38 38 - 42 mm Hg    POCT pO2, Arterial 168 (H) 85 - 95 mm Hg    POCT SO2, Arterial 100 94 - 100 %    POCT Oxy Hemoglobin, Arterial 96.5 94.0 - 98.0 %    POCT Hematocrit Calculated, Arterial 23.0 (L) 36.0 - 46.0 %    POCT Sodium, Arterial 134 (L) 136 - 145 mmol/L    POCT Potassium, Arterial 4.7 3.5 - 5.3 mmol/L    POCT Chloride, Arterial 105 98 - 107 mmol/L    POCT Ionized Calcium, Arterial 1.07 (L) 1.10 - 1.33 mmol/L    POCT Glucose, Arterial 191 (H) 74 - 99 mg/dL    POCT Lactate, Arterial 0.6 0.4 - 2.0 mmol/L    POCT Base Excess, Arterial 1.6 -2.0 - 3.0 mmol/L    POCT HCO3 Calculated, Arterial 25.8 22.0 - 26.0 mmol/L    POCT Hemoglobin, Arterial 7.8 (L) 12.0 - 16.0 g/dL    POCT Anion Gap, Arterial 8 (L) 10 - 25 mmo/L    Patient Temperature 37.0 degrees Celsius    FiO2 30 %   Calcium, Ionized   Result Value Ref Range    POCT Calcium, Ionized 1.03 (L) 1.1 - 1.33 mmol/L   Magnesium   Result Value Ref Range    Magnesium 2.29 1.60 - 2.40 mg/dL   Renal Function Panel   Result Value Ref Range    Glucose 186 (H) 74 - 99 mg/dL    Sodium 137 136 - 145 mmol/L    Potassium 4.6 3.5 - 5.3 mmol/L    Chloride 102 98 - 107 mmol/L    Bicarbonate 25 21 - 32 mmol/L    Anion Gap 15 10 - 20 mmol/L    Urea Nitrogen 13 6 - 23 mg/dL    Creatinine 0.85 0.50 - 1.05 mg/dL    eGFR >90 >60 mL/min/1.73m*2    Calcium 8.3 (L) 8.6 - 10.6 mg/dL    Phosphorus 2.5 2.5 - 4.9 mg/dL    Albumin 4.5 3.4 - 5.0 g/dL   POCT GLUCOSE   Result Value Ref Range    POCT Glucose 175 (H) 74 - 99 mg/dL   Heparin Assay, UFH   Result Value Ref Range    Heparin Unfractionated <0.1 See Comment Below for Therapeutic Ranges IU/mL   Blood Gas Arterial Full Panel   Result Value Ref Range    POCT pH, Arterial 7.46 (H) 7.38 - 7.42 pH    POCT pCO2, Arterial 37 (L) 38 - 42 mm Hg    POCT pO2,  Arterial 290 (H) 85 - 95 mm Hg    POCT SO2, Arterial 100 94 - 100 %    POCT Oxy Hemoglobin, Arterial 96.1 94.0 - 98.0 %    POCT Hematocrit Calculated, Arterial 23.0 (L) 36.0 - 46.0 %    POCT Sodium, Arterial 135 (L) 136 - 145 mmol/L    POCT Potassium, Arterial 4.8 3.5 - 5.3 mmol/L    POCT Chloride, Arterial 106 98 - 107 mmol/L    POCT Ionized Calcium, Arterial 1.05 (L) 1.10 - 1.33 mmol/L    POCT Glucose, Arterial 150 (H) 74 - 99 mg/dL    POCT Lactate, Arterial 0.7 0.4 - 2.0 mmol/L    POCT Base Excess, Arterial 2.3 -2.0 - 3.0 mmol/L    POCT HCO3 Calculated, Arterial 26.3 (H) 22.0 - 26.0 mmol/L    POCT Hemoglobin, Arterial 7.6 (L) 12.0 - 16.0 g/dL    POCT Anion Gap, Arterial 8 (L) 10 - 25 mmo/L    Patient Temperature 37.0 degrees Celsius    FiO2 30 %     *Note: Due to a large number of results and/or encounters for the requested time period, some results have not been displayed. A complete set of results can be found in Results Review.        ASSESSMENT AND PLAN:    Ms. Yimi Bowles is a 34 y.o. female who underwent a kidney transplant surgery  on 3/31/2022 (Heart).    Principal Problem:    Cardiogenic shock (CMS/[facility])  Active Problems:    Heart transplant recipient (CMS/[facility])    ESRD (end stage renal disease) on dialysis (CMS/[facility])    HIRAM (acute kidney injury) (CMS/[facility])    Acute passive congestion of liver    Hyponatremia    Iron deficiency anemia    Abdominal pain    Cardiac transplant rejection (CMS/[facility])    Acute combined systolic (congestive) and diastolic (congestive) heart failure (CMS/[facility])      CRRT Management Note:    - Patient was seen and examined while on CVVH   - Lab was reviewed  - CVVH order was reviewed  -Discussed with primary team  -Discussed with RN   - Will continue CVVH for now  -Daily evaluation for indications for CVVH and will convert it to regular IHD once the patient is more hemodynamically stable.    Heart and Kidney transplant Candidacy :   Will meet the OPTN eligibility criteria for kidney  transplant on 3/29/24 for sustained HIRAM over 6 weeks. Pre tx work up - per heart tx team.   Note:  GFR 2/17/24=23  CVVH started 2/19 and has remained on CVVH still  She is approved for CANDIDATE PENDING HEART TRANSPLANT APPROVAL at kidney transplant selection committee 3/28/24    [Sustained acute kidney injury : At least one of the following, or a combination of both of the following, for the last 6 weeks:    That the candidate has been on dialysis at least once every 7 days.    That the candidate has a measured or calculated CrCl or GFR less than or equal to 25 mL/min at least once every 7 days]      * Case was discussed with primary team.  For questions, please contact transplant nephrology page x [phone]    Russ Bergeron    Transplant Nephrologist

## 2024-04-01 NOTE — PROGRESS NOTES
Physical Therapy                 Therapy Communication Note    Patient Name: Charla Bowles  MRN: [id number]  Today's Date: 4/1/2024     Discipline: Physical Therapy    Missed Visit Reason: Missed Visit Reason:  (Pt remains intubated, with axillary impella, and ECMO.  Pt getting work up for heart/kideny tomorrow.  Team requesting to hold PT at this time.  Will re-attempt once medically appropraite.)    Missed Time: Attempt

## 2024-04-01 NOTE — PROGRESS NOTES
[facility]  ADVANCED HEART FAILURE AND TRANSPLANT CARDIOLOGY   Charla Bowles/[id number]    Admit Date: 2/15/2024  Hospital Length of Stay: 46   ICU Length of Stay: 4d 14h   Primary Service: CTICU      Charla Bowles is a 34 y.o. female on day 46 of admission presenting with Cardiogenic shock (CMS/HCC).    Subjective   Patient remains hemodynamically supported with VA ECMO and Impella. She failed a CPAP trial yesterday 2/2 apnea. She had received oxycodone which contributed to her apnea. PEEP at 10 currently with improving pulmonary edema on CXR. Remains off pressers, lightly sedated on Precedex. She failed CPAP yesterday, remains intubated with PEEP increased to 10, CXR with improving pulmonary edema. Systemic heparin and purge heparin have been discontinued 2/2 thrombocytopenia & epistaxis. Tacrolimus level undetectable today.     ECMO - 100% Fio2, sweep 0.5   Impella - P4     Plan:   - Resume straight rate low dose systemic heparin   - Wean PEEP to 8 and attempt CPAP   - Increase tacrolimus to 3mg BID     Objective     Physical Exam  Constitutional:       Appearance: She is ill-appearing.   HENT:      Head: Normocephalic.      Comments: Not actively bleeding      Mouth/Throat:      Mouth: Mucous membranes are dry.      Pharynx: Oropharynx is clear. No oropharyngeal exudate or posterior oropharyngeal erythema.   Eyes:      Extraocular Movements: Extraocular movements intact.      Conjunctiva/sclera: Conjunctivae normal.      Pupils: Pupils are equal, round, and reactive to light.   Neck:      Vascular: No JVD.   Cardiovascular:      Rate and Rhythm: Tachycardia present.      Comments: Right axillary impella in place ~45cm at hub (no hematoma), Right femoral VA ECMO in place with reperfusion catheter (site appears clean and dry). Dopplerable radial and ulnar pulses bilaterally. Monophasic LLE DP/PT/popliteal signals.  Lower extremities warm.   Pulmonary:      Effort:  "Pulmonary effort is normal. No accessory muscle usage, respiratory distress or retractions.      Breath sounds: No wheezing, rhonchi or rales.      Comments: Intubated via ETT. Chest wall moving symmetrically, minimal inline secretions.    Abdominal:      General: Abdomen is flat. Bowel sounds are normal. There is no distension.      Palpations: Abdomen is soft. There is no mass.      Hernia: No hernia is present.      Comments: OG in place   Musculoskeletal:         General: No swelling or tenderness. Normal range of motion.      Cervical back: Full passive range of motion without pain.   Skin:     General: Skin is dry.      Capillary Refill: Capillary refill takes less than 2 seconds.      Coloration: Skin is not jaundiced.      Findings: Bruising and lesion present. No erythema, laceration, rash or wound.   Neurological:      General: No focal deficit present.      Mental Status: She is alert.      Comments: Sedated on Precedex. Drowsy but able to communicate and answer yes/no questions   Fcx4   MAEx4        Last Recorded Vitals  Blood pressure 78/74, pulse 74, temperature 35.9 °C (96.6 °F), temperature source Axillary, resp. rate 12, height 1.549 m (5' 0.98\"), weight 90.3 kg (199 lb 1.2 oz), SpO2 100 %.  Intake/Output last 3 Shifts:  I/O last 3 completed shifts:  In: 3472.8 (38.5 mL/kg) [I.V.:1239.8 (13.7 mL/kg); Blood:483.1; NG/GT:1050; IV Piggyback:700]  Out: 5865 (65 mL/kg) [Other:5865]  Weight: 90.3 kg     Relevant Results  Scheduled medications  acetaminophen, 650 mg, oral, q6h  calcium carbonate-vitamin D3, 1 tablet, oral, Daily  cephalexin, 500 mg, oral, q12h TRICIA  cyanocobalamin, 500 mcg, oral, Daily  darbepoetin floyd, 100 mcg, subcutaneous, q14 days  ferrous sulfate (325 mg ferrous sulfate), 65 mg of iron, oral, Daily with breakfast  folic acid, 1 mg, oral, Daily  hydrALAZINE, 10 mg, oral, q8h  insulin lispro, 0-15 Units, subcutaneous, q4h  levothyroxine, 200 mcg, oral, Daily  lidocaine, 1 patch, " transdermal, Daily  multivitamin with iron-minerals, 15 mL, oral, Daily  multivitamin with minerals, 1 tablet, oral, Daily  [Held by provider] mycophenolate, 180 mg, oral, BID  nystatin, 5 mL, Swish & Swallow, q6h  oxygen, , inhalation, Continuous - Inhalation  pantoprazole, 40 mg, intravenous, Daily  polyethylene glycol, 17 g, oral, BID  potassium, sodium phosphates, 2 packet, oral, q8h  predniSONE, 10 mg, oral, Daily  sennosides-docusate sodium, 2 tablet, oral, BID  [Held by provider] sulfamethoxazole-trimethoprim, 80 mg of trimethoprim, oral, Daily  tacrolimus, 1.5 mg, oral, q24h  tacrolimus, 1.5 mg, oral, q24h  traZODone, 25 mg, oral, Nightly  valGANciclovir, 450 mg, oral, q48h      Continuous medications  dexmedeTOMIDine, 0.1-1.5 mcg/kg/hr, Last Rate: 1 mcg/kg/hr (04/01/24 0859)  heparin, 100 Units/hr, Last Rate: 100 Units/hr (04/01/24 0921)  lactated Ringer's, 5 mL/hr, Last Rate: 5 mL/hr (04/01/24 0836)  PrismaSol 4/2.5, 25 mL/kg/hr  sodium bicarbonate infusion Impella Purge 25 mEq/1000 mL D5W, 10 mL/hr, Last Rate: 10 mL/hr (04/01/24 0836)      PRN medications  PRN medications: bisacodyl, calcium gluconate, calcium gluconate, dextrose, dextrose **OR** glucagon, hydrALAZINE, HYDROmorphone, artificial tears, magnesium sulfate, magnesium sulfate, microfibrllar collagen, mupirocin, naloxone, ondansetron, oxyCODONE, oxymetazoline, sodium chloride     Results for orders placed or performed during the hospital encounter of 02/15/24 (from the past 24 hour(s))   Blood Gas Arterial Full Panel   Result Value Ref Range    POCT pH, Arterial 7.44 (H) 7.38 - 7.42 pH    POCT pCO2, Arterial 35 (L) 38 - 42 mm Hg    POCT pO2, Arterial 108 (H) 85 - 95 mm Hg    POCT SO2, Arterial 100 94 - 100 %    POCT Oxy Hemoglobin, Arterial 93.5 (L) 94.0 - 98.0 %    POCT Hematocrit Calculated, Arterial 25.0 (L) 36.0 - 46.0 %    POCT Sodium, Arterial 133 (L) 136 - 145 mmol/L    POCT Potassium, Arterial 4.1 3.5 - 5.3 mmol/L    POCT Chloride,  Arterial 105 98 - 107 mmol/L    POCT Ionized Calcium, Arterial 1.08 (L) 1.10 - 1.33 mmol/L    POCT Glucose, Arterial 153 (H) 74 - 99 mg/dL    POCT Lactate, Arterial 0.4 0.4 - 2.0 mmol/L    POCT Base Excess, Arterial -0.2 -2.0 - 3.0 mmol/L    POCT HCO3 Calculated, Arterial 23.8 22.0 - 26.0 mmol/L    POCT Hemoglobin, Arterial 8.4 (L) 12.0 - 16.0 g/dL    POCT Anion Gap, Arterial 8 (L) 10 - 25 mmo/L    Patient Temperature 37.0 degrees Celsius    FiO2 30 %   ECMO ARTERIAL FULL PANEL   Result Value Ref Range    POCT pH, Arterial ECMO 7.29 Reference range not established pH    POCT pCO2, Arterial ECMO 57 Reference range not established mm Hg    POCT pO2,  Arterial ECMO 455 Reference range not established mm Hg    POCT SO2, Arterial ECMO 100 Reference range not established %    POCT Oxy Hemoglobin, Arterial ECMO 94.3 Reference range not established %    POCT Hematocrit Calculated, Arterial ECMO 26.0 (L) 36.0 - 46.0 %    POCT Sodium, Arterial  ECMO 134 (L) 136 - 145 mmol/L    POCT Potassium, Arterial  ECMO 4.6 3.5 - 5.3 mmol/L    POCT Chloride, Arterial  ECMO 102 98 - 107 mmol/L    POCT Ionized Calcium, Arterial  ECMO 1.15 1.10 - 1.33 mmol/L    POCT Glucose, Arterial  ECMO 161 (H) 74 - 99 mg/dL    POCT Lactate Arterial  ECMO 0.4 0.4 - 2.0 mmol/L    POCT Base Excess, Arterial ECMO 0.3 Reference range not established mmol/L    POCT HCO3 Calculated, Arterial ECMO 27.4 Reference range not established mmol/L    POCT Hemoglobin, Arterial  ECMO 8.8 (L) 12.0 - 16.0 g/dL    POCT Anion Gap, Arterial  ECMO 9 Reference range not established mmo/L    Patient Temperature 37.0 degrees Celsius    FiO2 100 %   ECMO VENOUS FULL PANEL   Result Value Ref Range    POCT pH, Venous ECMO 7.30 Reference range not established pH    POCT pCO2, Venous ECMO 58 Reference range not established mm Hg    POCT pO2,  Venous  ECMO 52 Reference range not established mm Hg    POCT SO2, Venous ECMO 88 Reference range not established %    POCT Oxy Hemoglobin,  Venous ECMO 81.8 Reference range not established %    POCT Hematocrit Calculated, Venous ECMO 26.0 (L) 36.0 - 46.0 %    POCT Sodium, Venous ECMO 134 (L) 136 - 145 mmol/L    POCT Potassium, Venous ECMO 4.7 3.5 - 5.3 mmol/L    POCT Chloride, Venous ECMO 102 98 - 107 mmol/L    POCT Ionized Calcium, Venous ECMO 1.16 1.10 - 1.33 mmol/L    POCT Glucose, Venous ECMO 159 (H) 74 - 99 mg/dL    POCT Lactate Venous ECMO 0.4 0.4 - 2.0 mmol/L    POCT Base Excess, Venous ECMO 1.5 Reference range not established mmol/L    POCT HCO3 Calculated, Venous ECMO 28.5 Reference range not established mmol/L    POCT Hemoglobin, Venous ECMO 8.6 (L) 12.0 - 16.0 g/dL    POCT Anion Gap, Venous ECMO 8 Reference range not established mmo/L    Patient Temperature 37.0 degrees Celsius    FiO2 100 %   Calcium, Ionized   Result Value Ref Range    POCT Calcium, Ionized 1.13 1.1 - 1.33 mmol/L   CBC   Result Value Ref Range    WBC 5.1 4.4 - 11.3 x10*3/uL    nRBC 9.3 (H) 0.0 - 0.0 /100 WBCs    RBC 2.92 (L) 4.00 - 5.20 x10*6/uL    Hemoglobin 8.3 (L) 12.0 - 16.0 g/dL    Hematocrit 25.3 (L) 36.0 - 46.0 %    MCV 87 80 - 100 fL    MCH 28.4 26.0 - 34.0 pg    MCHC 32.8 32.0 - 36.0 g/dL    RDW 22.5 (H) 11.5 - 14.5 %    Platelets 85 (L) 150 - 450 x10*3/uL   Hepatic function panel   Result Value Ref Range    Albumin 3.6 3.4 - 5.0 g/dL    Bilirubin, Total 4.2 (H) 0.0 - 1.2 mg/dL    Bilirubin, Direct 2.1 (H) 0.0 - 0.3 mg/dL    Alkaline Phosphatase 201 (H) 33 - 110 U/L    ALT 29 7 - 45 U/L     (H) 9 - 39 U/L    Total Protein 5.5 (L) 6.4 - 8.2 g/dL   Coagulation Screen   Result Value Ref Range    Protime 12.9 (H) 9.8 - 12.8 seconds    INR 1.1 0.9 - 1.1    aPTT 35 27 - 38 seconds   Magnesium   Result Value Ref Range    Magnesium 2.49 (H) 1.60 - 2.40 mg/dL   Heparin Assay, UFH   Result Value Ref Range    Heparin Unfractionated 0.2 See Comment Below for Therapeutic Ranges IU/mL   Blood Gas Arterial Full Panel   Result Value Ref Range    POCT pH, Arterial 7.40 7.38  - 7.42 pH    POCT pCO2, Arterial 40 38 - 42 mm Hg    POCT pO2, Arterial 123 (H) 85 - 95 mm Hg    POCT SO2, Arterial 100 94 - 100 %    POCT Oxy Hemoglobin, Arterial 93.6 (L) 94.0 - 98.0 %    POCT Hematocrit Calculated, Arterial 26.0 (L) 36.0 - 46.0 %    POCT Sodium, Arterial 133 (L) 136 - 145 mmol/L    POCT Potassium, Arterial 4.5 3.5 - 5.3 mmol/L    POCT Chloride, Arterial 103 98 - 107 mmol/L    POCT Ionized Calcium, Arterial 1.14 1.10 - 1.33 mmol/L    POCT Glucose, Arterial 158 (H) 74 - 99 mg/dL    POCT Lactate, Arterial 0.5 0.4 - 2.0 mmol/L    POCT Base Excess, Arterial 0.0 -2.0 - 3.0 mmol/L    POCT HCO3 Calculated, Arterial 24.8 22.0 - 26.0 mmol/L    POCT Hemoglobin, Arterial 8.8 (L) 12.0 - 16.0 g/dL    POCT Anion Gap, Arterial 10 10 - 25 mmo/L    Patient Temperature 37.0 degrees Celsius    FiO2 30 %   Basic Metabolic Panel   Result Value Ref Range    Glucose 170 (H) 74 - 99 mg/dL    Sodium 136 136 - 145 mmol/L    Potassium 4.4 3.5 - 5.3 mmol/L    Chloride 101 98 - 107 mmol/L    Bicarbonate 27 21 - 32 mmol/L    Anion Gap 12 10 - 20 mmol/L    Urea Nitrogen 14 6 - 23 mg/dL    Creatinine 0.85 0.50 - 1.05 mg/dL    eGFR >90 >60 mL/min/1.73m*2    Calcium 8.4 (L) 8.6 - 10.6 mg/dL   Phosphorus   Result Value Ref Range    Phosphorus 2.2 (L) 2.5 - 4.9 mg/dL   POCT GLUCOSE   Result Value Ref Range    POCT Glucose 161 (H) 74 - 99 mg/dL   ACTIVATED CLOTTING TIME LOW   Result Value Ref Range    POCT Activated Clotting Time Low Range 205 (H) 83 - 199 sec   Blood Gas Arterial Full Panel   Result Value Ref Range    POCT pH, Arterial 7.41 7.38 - 7.42 pH    POCT pCO2, Arterial 40 38 - 42 mm Hg    POCT pO2, Arterial 170 (H) 85 - 95 mm Hg    POCT SO2, Arterial 100 94 - 100 %    POCT Oxy Hemoglobin, Arterial 93.7 (L) 94.0 - 98.0 %    POCT Hematocrit Calculated, Arterial 25.0 (L) 36.0 - 46.0 %    POCT Sodium, Arterial 134 (L) 136 - 145 mmol/L    POCT Potassium, Arterial 4.2 3.5 - 5.3 mmol/L    POCT Chloride, Arterial 104 98 - 107  mmol/L    POCT Ionized Calcium, Arterial 1.08 (L) 1.10 - 1.33 mmol/L    POCT Glucose, Arterial 137 (H) 74 - 99 mg/dL    POCT Lactate, Arterial 0.3 (L) 0.4 - 2.0 mmol/L    POCT Base Excess, Arterial 0.7 -2.0 - 3.0 mmol/L    POCT HCO3 Calculated, Arterial 25.4 22.0 - 26.0 mmol/L    POCT Hemoglobin, Arterial 8.2 (L) 12.0 - 16.0 g/dL    POCT Anion Gap, Arterial 9 (L) 10 - 25 mmo/L    Patient Temperature 37.0 degrees Celsius    FiO2 30 %   CBC   Result Value Ref Range    WBC 4.6 4.4 - 11.3 x10*3/uL    nRBC 6.4 (H) 0.0 - 0.0 /100 WBCs    RBC 2.85 (L) 4.00 - 5.20 x10*6/uL    Hemoglobin 7.8 (L) 12.0 - 16.0 g/dL    Hematocrit 25.2 (L) 36.0 - 46.0 %    MCV 88 80 - 100 fL    MCH 27.4 26.0 - 34.0 pg    MCHC 31.0 (L) 32.0 - 36.0 g/dL    RDW 22.8 (H) 11.5 - 14.5 %    Platelets 65 (L) 150 - 450 x10*3/uL   Blood Gas Arterial Full Panel   Result Value Ref Range    POCT pH, Arterial 7.41 7.38 - 7.42 pH    POCT pCO2, Arterial 42 38 - 42 mm Hg    POCT pO2, Arterial 152 (H) 85 - 95 mm Hg    POCT SO2, Arterial 100 94 - 100 %    POCT Oxy Hemoglobin, Arterial 95.1 94.0 - 98.0 %    POCT Hematocrit Calculated, Arterial 33.0 (L) 36.0 - 46.0 %    POCT Sodium, Arterial 132 (L) 136 - 145 mmol/L    POCT Potassium, Arterial 4.4 3.5 - 5.3 mmol/L    POCT Chloride, Arterial 102 98 - 107 mmol/L    POCT Ionized Calcium, Arterial 1.05 (L) 1.10 - 1.33 mmol/L    POCT Glucose, Arterial 182 (H) 74 - 99 mg/dL    POCT Lactate, Arterial 0.4 0.4 - 2.0 mmol/L    POCT Base Excess, Arterial 1.7 -2.0 - 3.0 mmol/L    POCT HCO3 Calculated, Arterial 26.6 (H) 22.0 - 26.0 mmol/L    POCT Hemoglobin, Arterial 10.9 (L) 12.0 - 16.0 g/dL    POCT Anion Gap, Arterial 8 (L) 10 - 25 mmo/L    Patient Temperature 37.0 degrees Celsius    FiO2 30 %   POCT GLUCOSE   Result Value Ref Range    POCT Glucose 101 (H) 74 - 99 mg/dL   ECMO Arterial Blood Gas   Result Value Ref Range    POCT pH, Arterial ECMO 7.27 Reference range not established pH    POCT pCO2, Arterial ECMO 65 Reference  range not established mm Hg    POCT pO2,  Arterial ECMO 501 Reference range not established mm Hg    POCT SO2, Arterial ECMO 100 Reference range not established %    POCT Oxy Hemoglobin, Arterial ECMO 95.7 Reference range not established %    POCT Base Excess, Arterial ECMO 1.2 Reference range not established mmol/L    POCT HCO3 Calculated, Arterial ECMO 29.8 Reference range not established mmol/L    Patient Temperature 37.0 degrees Celsius    FiO2 30 %   Reticulocytes   Result Value Ref Range    Retic % 3.9 (H) 0.5 - 2.0 %    Retic Absolute 0.105 (H) 0.018 - 0.083 x10*6/uL    Reticulocyte Hemoglobin 31 28 - 38 pg    Immature Retic fraction 43.9 (H) <=16.0 %   CBC and Auto Differential   Result Value Ref Range    WBC 4.0 (L) 4.4 - 11.3 x10*3/uL    nRBC 5.4 (H) 0.0 - 0.0 /100 WBCs    RBC 2.68 (L) 4.00 - 5.20 x10*6/uL    Hemoglobin 7.6 (L) 12.0 - 16.0 g/dL    Hematocrit 24.0 (L) 36.0 - 46.0 %    MCV 90 80 - 100 fL    MCH 28.4 26.0 - 34.0 pg    MCHC 31.7 (L) 32.0 - 36.0 g/dL    RDW 23.2 (H) 11.5 - 14.5 %    Platelets 36 (LL) 150 - 450 x10*3/uL    Immature Granulocytes %, Automated 7.2 (H) 0.0 - 0.9 %    Immature Granulocytes Absolute, Automated 0.29 0.00 - 0.70 x10*3/uL   ECMO Venous Blood Gas   Result Value Ref Range    POCT pH, Venous ECMO 7.26 Reference range not established pH    POCT pCO2, Venous ECMO 65 Reference range not established mm Hg    POCT pO2,  Venous  ECMO 60 Reference range not established mm Hg    POCT SO2, Venous ECMO 95 Reference range not established %    POCT Oxy Hemoglobin, Venous ECMO 90.2 Reference range not established %    POCT Base Excess, Venous ECMO 0.5 Reference range not established mmol/L    POCT HCO3 Calculated, Venous ECMO 29.2 Reference range not established mmol/L    Patient Temperature 37.0 degrees Celsius    FiO2 30 %   Lactate Dehydrogenase   Result Value Ref Range    LDH 1,908 (H) 84 - 246 U/L   Calcium, Ionized   Result Value Ref Range    POCT Calcium, Ionized 0.97 (L) 1.1 -  1.33 mmol/L   Hepatic function panel   Result Value Ref Range    Albumin 4.5 3.4 - 5.0 g/dL    Bilirubin, Total 2.4 (H) 0.0 - 1.2 mg/dL    Bilirubin, Direct 1.3 (H) 0.0 - 0.3 mg/dL    Alkaline Phosphatase 177 (H) 33 - 110 U/L    ALT 21 7 - 45 U/L     (H) 9 - 39 U/L    Total Protein 6.0 (L) 6.4 - 8.2 g/dL   Coagulation Screen   Result Value Ref Range    Protime 13.6 (H) 9.8 - 12.8 seconds    INR 1.2 (H) 0.9 - 1.1    aPTT 31 27 - 38 seconds   Magnesium   Result Value Ref Range    Magnesium 2.45 (H) 1.60 - 2.40 mg/dL   Basic Metabolic Panel   Result Value Ref Range    Glucose 136 (H) 74 - 99 mg/dL    Sodium 138 136 - 145 mmol/L    Potassium 4.7 3.5 - 5.3 mmol/L    Chloride 102 98 - 107 mmol/L    Bicarbonate 27 21 - 32 mmol/L    Anion Gap 14 10 - 20 mmol/L    Urea Nitrogen 14 6 - 23 mg/dL    Creatinine 0.80 0.50 - 1.05 mg/dL    eGFR >90 >60 mL/min/1.73m*2    Calcium 8.1 (L) 8.6 - 10.6 mg/dL   Phosphorus   Result Value Ref Range    Phosphorus 3.8 2.5 - 4.9 mg/dL   Manual Differential   Result Value Ref Range    Neutrophils %, Manual 81.8 40.0 - 80.0 %    Lymphocytes %, Manual 11.3 13.0 - 44.0 %    Monocytes %, Manual 5.2 2.0 - 10.0 %    Eosinophils %, Manual 1.7 0.0 - 6.0 %    Basophils %, Manual 0.0 0.0 - 2.0 %    Seg Neutrophils Absolute, Manual 3.27 1.20 - 7.00 x10*3/uL    Lymphocytes Absolute, Manual 0.45 (L) 1.20 - 4.80 x10*3/uL    Monocytes Absolute, Manual 0.21 0.10 - 1.00 x10*3/uL    Eosinophils Absolute, Manual 0.07 0.00 - 0.70 x10*3/uL    Basophils Absolute, Manual 0.00 0.00 - 0.10 x10*3/uL    Total Cells Counted 115     RBC Morphology See Below     Hypochromia Mild     RBC Fragments Many    Blood Gas Arterial Full Panel   Result Value Ref Range    POCT pH, Arterial 7.46 (H) 7.38 - 7.42 pH    POCT pCO2, Arterial 36 (L) 38 - 42 mm Hg    POCT pO2, Arterial 165 (H) 85 - 95 mm Hg    POCT SO2, Arterial 100 94 - 100 %    POCT Oxy Hemoglobin, Arterial 95.5 94.0 - 98.0 %    POCT Hematocrit Calculated, Arterial  26.0 (L) 36.0 - 46.0 %    POCT Sodium, Arterial 135 (L) 136 - 145 mmol/L    POCT Potassium, Arterial 4.6 3.5 - 5.3 mmol/L    POCT Chloride, Arterial 107 98 - 107 mmol/L    POCT Ionized Calcium, Arterial 1.00 (L) 1.10 - 1.33 mmol/L    POCT Glucose, Arterial 125 (H) 74 - 99 mg/dL    POCT Lactate, Arterial 0.5 0.4 - 2.0 mmol/L    POCT Base Excess, Arterial 1.8 -2.0 - 3.0 mmol/L    POCT HCO3 Calculated, Arterial 25.6 22.0 - 26.0 mmol/L    POCT Hemoglobin, Arterial 8.8 (L) 12.0 - 16.0 g/dL    POCT Anion Gap, Arterial 7 (L) 10 - 25 mmo/L    Patient Temperature 37.0 degrees Celsius    FiO2 30 %   Tacrolimus level   Result Value Ref Range    Tacrolimus  <2.0 <=15.0 ng/mL   Blood Gas Arterial Full Panel   Result Value Ref Range    POCT pH, Arterial 7.45 (H) 7.38 - 7.42 pH    POCT pCO2, Arterial 38 38 - 42 mm Hg    POCT pO2, Arterial 208 (H) 85 - 95 mm Hg    POCT SO2, Arterial 100 94 - 100 %    POCT Oxy Hemoglobin, Arterial 95.8 94.0 - 98.0 %    POCT Hematocrit Calculated, Arterial 24.0 (L) 36.0 - 46.0 %    POCT Sodium, Arterial 135 (L) 136 - 145 mmol/L    POCT Potassium, Arterial 4.5 3.5 - 5.3 mmol/L    POCT Chloride, Arterial 106 98 - 107 mmol/L    POCT Ionized Calcium, Arterial 1.07 (L) 1.10 - 1.33 mmol/L    POCT Glucose, Arterial 164 (H) 74 - 99 mg/dL    POCT Lactate, Arterial 0.5 0.4 - 2.0 mmol/L    POCT Base Excess, Arterial 2.3 -2.0 - 3.0 mmol/L    POCT HCO3 Calculated, Arterial 26.4 (H) 22.0 - 26.0 mmol/L    POCT Hemoglobin, Arterial 8.0 (L) 12.0 - 16.0 g/dL    POCT Anion Gap, Arterial 7 (L) 10 - 25 mmo/L    Patient Temperature 37.0 degrees Celsius    FiO2 30 %   POCT GLUCOSE   Result Value Ref Range    POCT Glucose 140 (H) 74 - 99 mg/dL     *Note: Due to a large number of results and/or encounters for the requested time period, some results have not been displayed. A complete set of results can be found in Results Review.       XR chest 1 view    Result Date: 3/28/2024  Interpreted By:  Sandra Griffith and Stephens  Charity STUDY: XR CHEST 1 VIEW;  3/27/2024 8:38 pm   INDICATION: Signs/Symptoms:Post op cardiac surgery.   COMPARISON: Chest radiograph 03/27/2024   ACCESSION NUMBER(S): [id number]   ORDERING CLINICIAN: YESENIA KHAN   FINDINGS: AP radiograph of the chest was provided.   Endotracheal tube noted with tip projecting approximately 2.0 cm in the right mainstem bronchus. Enteric tube seen coursing below the level diaphragm with tip out of the field of view. Right subclavian Impella device overlying the cardiomediastinal silhouette. Right IJ central venous catheter with tip overlying the mid SVC. ECMO cannula with tip overlying the expected location of the right atrium. Surgical clips overlie the cardiomediastinal silhouette and right axilla. Cardiac device projects over the cardiomediastinal silhouette. Postsurgical changes from median sternotomy.   CARDIOMEDIASTINAL SILHOUETTE: The cardiomediastinal silhouette is obscured. The mediastinum is shifted to the left likely secondary atelectasis.   LUNGS: Hazy opacification of the left hemithorax. Right basilar atelectasis. No pneumothorax.   ABDOMEN: No remarkable upper abdominal findings.   BONES: No acute osseous changes.       1.  Endotracheal tube with tip in the right mainstem bronchus, retraction of at least 2 cm is recommended. 2. Hazy opacification of the left hemithorax with leftward mediastinal shift, likely secondary atelectasis. 3. Postsurgical changes and medical devices as described above.   I personally reviewed the images/study and I agree with Charity Ross DO's (radiology resident) findings as stated. This study was interpreted at [facility], [place], [place].   MACRO: Charity Ross discussed the significance and urgency of this critical finding by telephone with  YESENIA KHAN on 3/27/2024 at 9:37 pm.  (**-RCF-**) Findings:  See findings.     Signed by: Sandra Griffith 3/28/2024 9:19 AM Dictation workstation:    [id number]    Vascular US Lower Extremity Arterial Duplex Bilateral    Result Date: 3/28/2024  Preliminary Cardiology Report           [place] [address]   Tel [phone] and Fax [phone]                 Preliminary Vascular Lab Report  VASC US LOWER EXTREMITY ARTERIAL DUPLEX GRAFT BILATERAL W/ KATHIE  Patient Name:     MIGUEL DIMAS      Reading           [id number] [address]                Physician:        MD Study Date:       3/27/2024            Ordering          [id number] FIONA WHYTE                                        Physician: MRN/PID:          [id number]             Technologist:     Evangelina Bailey RVT Accession#:       [id number]         Technologist 2:   Joy Lewis RVT Date of           1990             Encounter#:       [id number] Birth/Age: Gender:           F Admission Status: Inpatient            Location          [facility]                                        Performed:  Diagnosis/ICD: Acute Kidney Failure-N17.9; Encounter for other preprocedural                examination-Z01.818 Indication:    pre ECMO placement Procedure/CPT: 17023 Peripheral artery Lower arterial Duplex BPG complete  **Report Amended** Report Amended By: Evangelina Bailey RVT     Date and Time: 3/28/2024 at 3/28/2024  **CRITICAL RESULT** Critical Result: Left SFA prox occlusion with reconstitution via collateralized flow noted distally. Notification called to Pretty Toussaint MD on 3/27/2024 at 3:20:25 PM by Evangelina Bailey RVT.  PRELIMINARY CONCLUSIONS: Right Lower Arterial: Evaluated right EIA distal, CFA, SFA prox, and Profunda appear patent. Left Lower Arterial: There appears to be a short segment occlusion noted in the proximal SFA just distal to the CFA bifurcation. There is collateralized flow noted distally. There are elevated velocities noted in the left CFA, may be overestimated due to shadowing from previous  procedures. Right Lower Venous: The evaluated right EIV distal, CFV, FV prox, and DFV appear patent with no evidence of acute deep vein thrombus. Left Lower Venous: Cannot rule out thrombus in non-compressible iliac and common femoral vein veins. Patient unable to tolerate compressions. Color flow and Doppler noted. No ultrasonic evidence of acute deep vein thrombus in the evaluated left EIV distal, CFV, FV prox, and DFV.  Additional Findings: Irregular heart rhythm noted due to cardiac device. Left groin was technically difficult due to previous procedures and scarring.  Imaging & Doppler Findings:  Right                 Compressible Thrombus   Flow Distal External Iliac     Yes        None   Pulsatile CFV                       Yes        None   Pulsatile PFV                       Yes        None   Pulsatile FV Proximal               Yes        None   Pulsatile  Left                  Compress Thrombus   Flow Distal External Iliac                   Pulsatile CFV                                     Pulsatile PFV                                     Pulsatile FV Proximal             Yes      None   Pulsatile  Right                     Left   PSV                       PSV 56 cm/s        EIA        30 cm/s 29 cm/s        CFA        72 cm/s 36 cm/s Profunda Proximal 70 cm/s 40 cm/s   SFA Proximal    28 cm/s  VASCULAR PRELIMINARY REPORT completed by Evangelina Bailey DILMA on 3/28/2024 at 7:32:14 AM  ** Final (Updated) **     XR abdomen 1 view    Result Date: 3/27/2024  Interpreted By:  Sandra Griffith and Sheng Max STUDY: XR ABDOMEN 1 VIEW; XR CHEST 1 VIEW;  3/26/2024 7:12 pm; 3/27/2024 7:44 am   INDICATION: Signs/Symptoms:S/p OHT r/o ileas; Signs/Symptoms:Impella.   COMPARISON: Chest radiographs dated 03/26/2024, 03/25/2024, 03/22/2024 and CT chest abdomen pelvis dated 03/17/2024   ACCESSION NUMBER(S): [id number]; [id number]   ORDERING CLINICIAN: FILOMENA KEANE   FINDINGS: AP radiographs of the chest and  abdomen were provided:   LINES AND DEVICES: Right subclavian approach Impella device projects over the left ventricle. Right internal jugular approach central venous catheter tip projects over the lower SVC. CardioMEMS device projects over the cardiomediastinal silhouette. Numerous cardiac pacing wires are present. Surgical clips project over the cardiomediastinal silhouette and right axilla. Numerous intact median sternotomy wires.   CARDIOMEDIASTINAL SILHOUETTE: Stable enlargement of the cardiomediastinal silhouette is stable in size and configuration.   LUNGS: There is no pulmonary edema. Linear subsegmental bibasilar atelectasis. Trace bilateral pleural effusions. No focal consolidation or pneumothorax is seen.   ABDOMEN: Paucity of gas in the imaged bowel loops with nonobstructive bowel gas pattern. Limited evaluation of pneumoperitoneum on supine imaging, however no gross evidence of free air is noted.   BONES: No acute osseous abnormality.       1. Stable enlargement of the cardiomediastinal silhouette with trace bilateral pleural effusions. 2. Paucity of gas in the imaged bowel loops with nonobstructive bowel gas pattern.     I personally reviewed the images/study and I agree with the findings as stated by Dr. Tee Joe. This study was interpreted at [facility], [place].   MACRO: none   Signed by: Sandra Griffith 3/27/2024 2:51 PM Dictation workstation:   [id number]    XR chest 1 view    Result Date: 3/27/2024  Interpreted By:  Sandra Griffith and Sheng Max STUDY: XR ABDOMEN 1 VIEW; XR CHEST 1 VIEW;  3/26/2024 7:12 pm; 3/27/2024 7:44 am   INDICATION: Signs/Symptoms:S/p OHT r/o ileas; Signs/Symptoms:Impella.   COMPARISON: Chest radiographs dated 03/26/2024, 03/25/2024, 03/22/2024 and CT chest abdomen pelvis dated 03/17/2024   ACCESSION NUMBER(S): [id number]; [id number]   ORDERING CLINICIAN: FILOMENA KEANE   FINDINGS: AP radiographs of the chest  and abdomen were provided:   LINES AND DEVICES: Right subclavian approach Impella device projects over the left ventricle. Right internal jugular approach central venous catheter tip projects over the lower SVC. CardioMEMS device projects over the cardiomediastinal silhouette. Numerous cardiac pacing wires are present. Surgical clips project over the cardiomediastinal silhouette and right axilla. Numerous intact median sternotomy wires.   CARDIOMEDIASTINAL SILHOUETTE: Stable enlargement of the cardiomediastinal silhouette is stable in size and configuration.   LUNGS: There is no pulmonary edema. Linear subsegmental bibasilar atelectasis. Trace bilateral pleural effusions. No focal consolidation or pneumothorax is seen.   ABDOMEN: Paucity of gas in the imaged bowel loops with nonobstructive bowel gas pattern. Limited evaluation of pneumoperitoneum on supine imaging, however no gross evidence of free air is noted.   BONES: No acute osseous abnormality.       1. Stable enlargement of the cardiomediastinal silhouette with trace bilateral pleural effusions. 2. Paucity of gas in the imaged bowel loops with nonobstructive bowel gas pattern.     I personally reviewed the images/study and I agree with the findings as stated by Dr. Tee Joe. This study was interpreted at [facility], [place], [place].   MACRO: none   Signed by: Sandra Griffith 3/27/2024 2:51 PM Dictation workstation:   [id number]    Transthoracic Echo (TTE) Complete    Result Date: 3/27/2024   [facility] (formerly [facility] [address] [place] [address]                Tel [phone] and Fax [phone] TRANSTHORACIC ECHOCARDIOGRAM REPORT  Patient Name:      MIGUEL DIMAS      Reading Physician:    [id number] Patrica BASS MD Study Date:        3/27/2024            Ordering Provider:    [id number] KONSTANTIN FINNEY                                                                MADDIE MRN/PID:           [id number]             Fellow: Accession#:        [id number]         Nurse: Date of Birth/Age: 1990 / 34 years  Sonographer:          MAURO Colbert RDCS Gender:            F                    Additional Staff: Height:            152.40 cm            Admit Date:           2/15/2024 Weight:            95.71 kg             Admission Status:     Inpatient - STAT BSA / BMI:         1.91 m2 / 41.21      Encounter#:           [id number]                    kg/m2                                         Department Location:  [address] Blood Pressure: 93 /57 mmHg Study Type:    TRANSTHORACIC ECHO (TTE) COMPLETE Diagnosis/ICD: 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 Definity used as a contrast agent for endocardial border               definition. Total contrast used for this procedure was 3 mL via IV               push. The patient was awake.  PHYSICIAN INTERPRETATION: Left Ventricle: The left ventricular systolic function is severely decreased, with an estimated ejection fraction of 25%. There is global hypokinesis of the left ventricle with minor regional variations. The left ventricular cavity size is normal. Left ventricular diastolic filling was indeterminate. There is no definite left ventricular thrombus visualized. Echocontrast was used and excluded LV apical thrombus. Left Atrium: The left atrium is consistent with transplant. Right Ventricle: The right ventricle is mildly enlarged. There is reduced right ventricular systolic function. Right Atrium: The right atrium is mildly dilated. Aortic Valve: The aortic valve was not well visualized. There is indeterminate aortic valve regurgitation. Mitral Valve: The mitral valve is mildly thickened. There is moderate to severe mitral  valve regurgitation which is centrally directed. Tricuspid Valve: The tricuspid valve is structurally normal. There is severe tricuspid regurgitation. The Doppler estimated RVSP is within normal limits at 28.8 mmHg. RSVP likely underestimated due to the severity of TR. Pulmonic Valve: The pulmonic valve is not well visualized. Pulmonic valve regurgitation was not assessed. Pericardium: There is a trivial pericardial effusion. Pleural: There is left pleural effusion. Aorta: The aortic root is normal. In comparison to the previous echocardiogram(s): Compared with the prior exam from 3/25/2024, there is still severe global LV systolic dysfunction while on full Impella suppoert. There was already moderate to severe MR which persists. TR was not assessed on the prior study but is severe TR today. Although the RVSP is estimated to be normal based on TR Vmax, that is likely underestimated due to the severity of TR. There was previously severely reduced RV function previously and appears imilar today.  LEFT VENTRICULAR ASSIST DEVICE: LVAD: The patient has a(n) Impella LVAD device present. LVAD Comments: IMpella extends approx 3.8-4.0 cm beneath the AV and is angles posteriorly.  CONCLUSIONS:  1. Left ventricular systolic function is severely decreased with a 25% estimated ejection fraction.  2. Echocontrast was used and excluded LV apical thrombus.  3. No left ventricular thrombus visualized.  4. There is reduced right ventricular systolic function.  5. Moderate to severe mitral valve regurgitation.  6. RVSP within normal limits.  7. Severe tricuspid regurgitation visualized.  8. Compared with the prior exam from 3/25/2024, there is still severe global LV systolic dysfunction while on full Impella suppoert. There was already moderate to severe MR which persists. TR was not assessed on the prior study but is severe TR today. Although the RVSP is estimated to be normal based on TR Vmax, that is likely underestimated due to  the severity of TR. There was previously severely reduced RV function previously and appears imilar today.  9. There is global hypokinesis of the left ventricle with minor regional variations. QUANTITATIVE DATA SUMMARY: 2D MEASUREMENTS:                          Normal Ranges: IVSd:          0.90 cm   (0.6-1.1cm) LVPWd:         0.90 cm   (0.6-1.1cm) LVIDd:         4.20 cm   (3.9-5.9cm) LVIDs:         3.30 cm LV Mass Index: 62.1 g/m2 LV % FS        21.4 % AORTA MEASUREMENTS:                      Normal Ranges: Ao Sinus, d: 2.00 cm (2.1-3.5cm) LV SYSTOLIC FUNCTION BY 2D PLANIMETRY (MOD):                     Normal Ranges: EF-A4C View: 25.6 % (>=55%) EF-A2C View: 26.2 % EF-Biplane:  35.2 %  RIGHT VENTRICLE: RV Basal 3.50 cm RV Mid   2.40 cm RV Major 7.0 cm TAPSE:   6.5 mm TRICUSPID VALVE/RVSP:                             Normal Ranges: Peak TR Velocity: 2.54 m/s RV Syst Pressure: 28.8 mmHg (< 30mmHg)  [id number] Patrica Aguilera MD Electronically signed on 3/27/2024 at 1:20:02 PM  ** Final **         This patient has a urinary catheter   Reason for the urinary catheter remaining today? Urine catheter unnecessary, will be removed today    This patient is intubated   Reason for patient to remain intubated today? they are planned for extubation trial later today/tonight       Malnutrition Diagnosis Status: Ongoing  Malnutrition Diagnosis: Moderate malnutrition related to acute disease or injury  As Evidenced by: pt's reported intake likely meeting <75% of estimated needs for at least 7 days, previous 5% weight loss in 1 month, LOS 42.  I agree with the dietitian's malnutrition diagnosis.      Assessment/Plan   Ms. Yimi Bowles is a 34F with a PMHx sig for stage D HFrEF (PPCM) s/p ICD s/p CardioMEMs, hypothyroidism 2/2 thyroidectomy, obesity s/p gastric bypass (2017), and SLE who is s/p OHT (3/31/2022) with a post-OHT course complicated by RIJ/RUE DVTs, leukopenia, left groin seroma, and asymptomatic 2R ACR (11/2022) treated with  steroids, s/p total hysterectomy (2023), Flu A (1/2024).     Originally presented to [facility] ED on 2/15/24 with complaints of N/V x 3 days and inability to keep medications down. Found to have acute systolic heart failure with primary graft failure and cardiogenic shock. Treated for suspected acute cellular vs. acute antibody mediated rejection; however, multiple cardiac biopsies negative for signs of rejection or other causes of graft failure. Course has been complicated by multiple MCS devices for refractory cardiogenic shock (right femoral IABP: 2/18/24 - 3/1/24, Left femoral VA ECMO: 2/19/24 - 2/29/24, Right axillary impella 5.5: 3/1/24 - remains in place, 2nd right femoral VA ECMO: 3/27/24 - remains in place), ARF requiring RRT, acute liver injury, intubation due to volume overload in setting of pulmonary edema, severe hemolysis 2/2 to impella malposition, mild CMV viremia, bilateral provoked IJ DVTs, multiple episodes of epistaxis & coagulopathies requiring ongoing blood product transfusions.       Transferred to [facility]ICU on 3/27/24 following right femoral VA ECMO placement due to worsening cardiogenic shock and multi-organ failure despite Impella 5.5 support and multiple inotropes.       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  #Acute renal failure requiring RRT   #Acute transaminitis  #Epistaxis   #Acute coagulopathy   #Low grade CMV Viremia  #Provoked bilateral IJ DVTs    #Thrombocytopenia   #Anemia     Donor/Recipient Infectious history:  CMV: -/+ (low grade CMV viremia w/ levels < 35 on 3/1/24, repeat CMV levels remain negative since 3/8/24)  Toxo: -/-   Hep C: -/-     Rejection/Prophylaxis (transplants):  - Steroids: Continue 10mg PO prednisone daily  - Tacrolimus: 3mg liquid @ 0630 and 3mg liquid @ 1830 w/ daily levels drawn @ 0600  - Tacrolimus goal troughs: 8-10  - Myfortic acid: HOLD 180mg BID    - Antifungals:  nystatin 500,000units Q6  - Antivirals: valcyte 450mg Q48hrs   - Anti PCP & Toxoplasmosis: holding Bactrim SS daily due to thrombocytopenia     Last cardiac biopsy: 2/16/24 with ACR grade 1R and no AMR (extremely low C4d+ detected), 2/20/24 negative for ACR and AMR  Last HLA: 3/2/24 negative for DSAs   Last RHC: closing numbers on 3/16/24 /86(97) RA 20, PAP 40/33(37), C.O./C.I. 5.5/2.8, PVR 88, SVR 1115   Last LHC: 2/18/24 negative for CAV and CAD   Last TTE/BOB: 3/27/24 shows LVEF 25% w/ global hypokinesis, severe RV dysfunction, mod-severe MR, severe TR and impella angled posteriorly   Osteopenia/osteoporosis prophylaxis: Vitamin D3 and calcium supplements  Peptic/gastric ulcer prophylaxis: Pantoprazole 40mg BID -> can transition to daily in next 24-48hrs if no signs of UGIB   CAV Prophylaxis: Holding Aspirin 81mg due to continued thrombocytopenia & pravastatin due to transaminitis     - Unclear cause of acute severe graft dysfunction. Broad differential including SLE flair, giant cell vasculitis, CAV/CAD, viral cardiomyopathy, sarcoidosis, amyloidosis and allograft rejection amongst many others. Most likely smoldering ACR vs. AMR; however, 2xallograft biopsies on 2/16 & 2/20 remain negative for any significant pathology. Notably, both biopsies taken after rejection therapies implemented which may have reduced areas of graft damage.    - DSAs remain negative; however, patient may have non-HLA antibodies present. 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   - Negative CAV & CAD via LHC on 2/18/24   - Completed methylpred steroid pulse w/ 1g Q24 x 3 days (2/16-2/18) and prednisone taper   - Thymoglobulin doses: 2/18 & 2/19   - IVIG + PLEX Session: 2/18, 2/20, 3/7, 3/11 and 3/13    - Right femoral VA ECMO flowing 3.5L w/ FIO2 100% and sweep 0.5  - Right axillary impella for LV venting remains in place and set P4 w/ flows of 1.9-2.3  - Remains sedated with Precedex and intubated via ETT: Vent lung  protective and set ACVC FIO2 50%, RR 10,  and PEEP 10  - LFTs improving s/p ECMO cannulation with stable hemolytic labwork   - Previously cardiorenal induced ARF resolved w/ renal recovery following ECMO cannulation. RRT stopped on 2/27/24 and patient had been making appropriate UOP. Post ECMO decannulation, again developed ARF requiring RRT. Her kidneys have had multiple injuries due to poor cardiac output and now with hemolysis induced injury. Will have been on RRT x 6 weeks as of 3/29/24, meeting criteria for kidney transplantation. Abdominal Transplant team will formally approve for kidney transplantation pending approval for heart transplantation.    - At this time, the patient has developed refractory heart failure and renal failure. Discussion for heart and kidney transplantation are ongoing. Concerns remain with deconditioning, multiorgan failure, lack of diagnosis causing graft failure, possible rejection despite appropriate immunotherapy, and optimal immunotherapy plan if re-transplanted.      Recommendations:  - Increase Tacro 3mg BID liquid BID. Given her LFTs are improving on ECMO, this is likely cause of undetectable tacro level given that tacro has hepatic metabolism. 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 Given that Myfortic cannot be crushed and patient remains intubated, can consider IV MMF (lower likelihood of inducing MMF colitis). Continue to be aggressive regarding regarding reaching tacro goal as she is not on MMF.  - Will opt to keep trialysis line for now given high probability of acute decompensation. Line placement difficult given L internal jugular thrmobosis, femoral ECMO cannula, and thrombocytopenia.    - Platelet transfusion for platelets < 30   - Agree with CVVH via ECMO circuit   - Agree with weaning PEEP, SBT, and potential extubation as appropriate  - Plan to discuss heart transplantation at thoracic transplant selection meeting on 4/2/24  - She  was approved for kidney transplant at selection committee on 3/28/2024 pending heart transplant approval     Continue excellent care per CTICU team.      Patient was examined and discussed with Advanced Heart Failure and Transplant Cardiology attending physician, Dr. Kendrick Dunn, JIMMY-CNP

## 2024-04-02 NOTE — PROGRESS NOTES
LISTING EDUCATION  Patient educated regarding the following prior to placement on the transplant waiting list:   The patient’s medical condition, prognosis, and treatment plan.   The expectations and patient responsibilities while on the waiting list, including:   Keeping the transplant center informed of any changes in contact information or insurance coverage   Notifying the transplant center of any changes in medical status   Required testing and/or re-evaluation appointments while awaiting transplant   An overview of the surgical procedure, including potential risks and alternatives.   Information regarding what to expect during the inpatient admission and recovery period.   A discussion regarding organ offers and types of potential donors, including potential risks that may be associated with specific types of donors that could affect the success of the transplant or the health of the patient.   National and center-specific outcomes from the most recent SRTR program specific report.    The right to refuse transplantation.      Patient was given the opportunity to have questions answered.  Patient was provided a copy of the signed informed consent for transplant listing.    Education provided by:  Transplant Coordinator: Mirian Phan RN   Transplant Physician: Alton Marks MD    Signed listing informed consent received? Yes, by both patient and mother Fani Geller  Patient agrees to be listed for the following: Heart Transplant (with concurrent kidney transplant, separate listing consent form documented)    Patient will be placed on the [facility] waiting list.

## 2024-04-02 NOTE — PROGRESS NOTES
"Palliative Medicine following for:  Complex medical decision making, symptom management, patient/family support    History obtained from chart review including ED note, H&P, patient's daily progress notes, review of lab/test results, and discussion with primary team and bedside RN.    Subjective    History of Present Illness  Report poor sleep and trazodone 25 mg has not helped. (Previously was on 50 but she doesn't want dose increased due to prior sedation).   No nausea.   Min cough.       Objective    Last Recorded Vitals  BP 78/74   Pulse (!) 123   Temp 35.7 °C (96.3 °F) (Axillary)   Resp (!) 49   Ht 1.549 m (5' 0.98\")   Wt 90.3 kg (199 lb 1.2 oz)   SpO2 91%   BMI 37.63 kg/m²      Physical Exam  Constitutional:       Appearance: Normal appearance.   HENT:      Head: Normocephalic and atraumatic.      Mouth/Throat:      Comments: Hoarse voice  Eyes:      Conjunctiva/sclera: Conjunctivae normal.   Cardiovascular:      Rate and Rhythm: Regular rhythm.   Pulmonary:      Breath sounds: Normal breath sounds.   Skin:     General: Skin is warm and dry.   Neurological:      General: No focal deficit present.      Mental Status: She is alert and oriented to person, place, and time.          Relevant Results   Results for orders placed or performed during the hospital encounter of 02/15/24 (from the past 24 hour(s))   Heparin Assay, UFH   Result Value Ref Range    Heparin Unfractionated <0.1 See Comment Below for Therapeutic Ranges IU/mL   Respiratory Culture/Smear    Specimen: SPUTUM; Fluid   Result Value Ref Range    Respiratory Culture/Smear No growth to date     Gram Stain       Gram stain indicates specimen consists of lower respiratory tract secretions.    Gram Stain No predominant organism    MRSA Surveillance for Vancomycin De-escalation, PCR    Specimen: Anterior Nares; Swab   Result Value Ref Range    MRSA PCR Not Detected Not Detected   Blood Gas Arterial Full Panel   Result Value Ref Range    POCT pH, " Arterial 7.46 (H) 7.38 - 7.42 pH    POCT pCO2, Arterial 37 (L) 38 - 42 mm Hg    POCT pO2, Arterial 290 (H) 85 - 95 mm Hg    POCT SO2, Arterial 100 94 - 100 %    POCT Oxy Hemoglobin, Arterial 96.1 94.0 - 98.0 %    POCT Hematocrit Calculated, Arterial 23.0 (L) 36.0 - 46.0 %    POCT Sodium, Arterial 135 (L) 136 - 145 mmol/L    POCT Potassium, Arterial 4.8 3.5 - 5.3 mmol/L    POCT Chloride, Arterial 106 98 - 107 mmol/L    POCT Ionized Calcium, Arterial 1.05 (L) 1.10 - 1.33 mmol/L    POCT Glucose, Arterial 150 (H) 74 - 99 mg/dL    POCT Lactate, Arterial 0.7 0.4 - 2.0 mmol/L    POCT Base Excess, Arterial 2.3 -2.0 - 3.0 mmol/L    POCT HCO3 Calculated, Arterial 26.3 (H) 22.0 - 26.0 mmol/L    POCT Hemoglobin, Arterial 7.6 (L) 12.0 - 16.0 g/dL    POCT Anion Gap, Arterial 8 (L) 10 - 25 mmo/L    Patient Temperature 37.0 degrees Celsius    FiO2 30 %   CBC   Result Value Ref Range    WBC 5.2 4.4 - 11.3 x10*3/uL    nRBC 2.7 (H) 0.0 - 0.0 /100 WBCs    RBC 2.64 (L) 4.00 - 5.20 x10*6/uL    Hemoglobin 7.3 (L) 12.0 - 16.0 g/dL    Hematocrit 23.7 (L) 36.0 - 46.0 %    MCV 90 80 - 100 fL    MCH 27.7 26.0 - 34.0 pg    MCHC 30.8 (L) 32.0 - 36.0 g/dL    RDW 23.7 (H) 11.5 - 14.5 %    Platelets 37 (LL) 150 - 450 x10*3/uL   Blood Culture    Specimen: Peripheral Venipuncture; Blood culture   Result Value Ref Range    Blood Culture Loaded on Instrument - Culture in progress    Blood Culture    Specimen: Peripheral Venipuncture; Blood culture   Result Value Ref Range    Blood Culture Loaded on Instrument - Culture in progress    POCT GLUCOSE   Result Value Ref Range    POCT Glucose 159 (H) 74 - 99 mg/dL   Reticulocytes   Result Value Ref Range    Retic % 4.2 (H) 0.5 - 2.0 %    Retic Absolute 0.104 (H) 0.018 - 0.083 x10*6/uL    Reticulocyte Hemoglobin 32 28 - 38 pg    Immature Retic fraction 37.2 (H) <=16.0 %   Haptoglobin   Result Value Ref Range    Haptoglobin <10 (L) 37 - 246 mg/dL   CBC and Auto Differential   Result Value Ref Range    WBC 4.9  4.4 - 11.3 x10*3/uL    nRBC 2.6 (H) 0.0 - 0.0 /100 WBCs    RBC 2.47 (L) 4.00 - 5.20 x10*6/uL    Hemoglobin 7.0 (L) 12.0 - 16.0 g/dL    Hematocrit 22.4 (L) 36.0 - 46.0 %    MCV 91 80 - 100 fL    MCH 28.3 26.0 - 34.0 pg    MCHC 31.3 (L) 32.0 - 36.0 g/dL    RDW 23.4 (H) 11.5 - 14.5 %    Platelets 33 (LL) 150 - 450 x10*3/uL    Immature Granulocytes %, Automated 6.3 (H) 0.0 - 0.9 %    Immature Granulocytes Absolute, Automated 0.31 0.00 - 0.70 x10*3/uL   Lactate Dehydrogenase   Result Value Ref Range    LDH 1,397 (H) 84 - 246 U/L   Calcium, Ionized   Result Value Ref Range    POCT Calcium, Ionized 1.01 (L) 1.1 - 1.33 mmol/L   Coagulation Screen   Result Value Ref Range    Protime 13.9 (H) 9.8 - 12.8 seconds    INR 1.2 (H) 0.9 - 1.1    aPTT 35 27 - 38 seconds   Heparin Assay, UFH   Result Value Ref Range    Heparin Unfractionated <0.1 See Comment Below for Therapeutic Ranges IU/mL   Hepatic function panel   Result Value Ref Range    Albumin 4.5 3.4 - 5.0 g/dL    Bilirubin, Total 2.2 (H) 0.0 - 1.2 mg/dL    Bilirubin, Direct 1.1 (H) 0.0 - 0.3 mg/dL    Alkaline Phosphatase 162 (H) 33 - 110 U/L    ALT 19 7 - 45 U/L     (H) 9 - 39 U/L    Total Protein 6.2 (L) 6.4 - 8.2 g/dL   Magnesium   Result Value Ref Range    Magnesium 2.29 1.60 - 2.40 mg/dL   Basic Metabolic Panel   Result Value Ref Range    Glucose 63 (L) 74 - 99 mg/dL    Sodium 136 136 - 145 mmol/L    Potassium 4.3 3.5 - 5.3 mmol/L    Chloride 101 98 - 107 mmol/L    Bicarbonate 27 21 - 32 mmol/L    Anion Gap 12 10 - 20 mmol/L    Urea Nitrogen 11 6 - 23 mg/dL    Creatinine 0.82 0.50 - 1.05 mg/dL    eGFR >90 >60 mL/min/1.73m*2    Calcium 8.4 (L) 8.6 - 10.6 mg/dL   Phosphorus   Result Value Ref Range    Phosphorus 1.7 (L) 2.5 - 4.9 mg/dL   Blood Gas Arterial Full Panel   Result Value Ref Range    POCT pH, Arterial 7.43 (H) 7.38 - 7.42 pH    POCT pCO2, Arterial 38 38 - 42 mm Hg    POCT pO2, Arterial 266 (H) 85 - 95 mm Hg    POCT SO2, Arterial 100 94 - 100 %    POCT  Oxy Hemoglobin, Arterial 96.6 94.0 - 98.0 %    POCT Hematocrit Calculated, Arterial 28.0 (L) 36.0 - 46.0 %    POCT Sodium, Arterial 133 (L) 136 - 145 mmol/L    POCT Potassium, Arterial 4.0 3.5 - 5.3 mmol/L    POCT Chloride, Arterial 104 98 - 107 mmol/L    POCT Ionized Calcium, Arterial 1.02 (L) 1.10 - 1.33 mmol/L    POCT Glucose, Arterial 56 (L) 74 - 99 mg/dL    POCT Lactate, Arterial 0.8 0.4 - 2.0 mmol/L    POCT Base Excess, Arterial 0.9 -2.0 - 3.0 mmol/L    POCT HCO3 Calculated, Arterial 25.2 22.0 - 26.0 mmol/L    POCT Hemoglobin, Arterial 9.2 (L) 12.0 - 16.0 g/dL    POCT Anion Gap, Arterial 8 (L) 10 - 25 mmo/L    Patient Temperature 37.0 degrees Celsius    FiO2 40 %   Manual Differential   Result Value Ref Range    Neutrophils %, Manual 80.5 40.0 - 80.0 %    Lymphocytes %, Manual 6.8 13.0 - 44.0 %    Monocytes %, Manual 8.5 2.0 - 10.0 %    Eosinophils %, Manual 0.8 0.0 - 6.0 %    Basophils %, Manual 0.0 0.0 - 2.0 %    Myelocytes %, Manual 3.4 0.0 - 0.0 %    Seg Neutrophils Absolute, Manual 3.94 1.20 - 7.00 x10*3/uL    Lymphocytes Absolute, Manual 0.33 (L) 1.20 - 4.80 x10*3/uL    Monocytes Absolute, Manual 0.42 0.10 - 1.00 x10*3/uL    Eosinophils Absolute, Manual 0.04 0.00 - 0.70 x10*3/uL    Basophils Absolute, Manual 0.00 0.00 - 0.10 x10*3/uL    Myelocytes Absolute, Manual 0.17 0.00 - 0.00 x10*3/uL    Total Cells Counted 118     RBC Morphology See Below     Polychromasia Mild     Hypochromia Mild     RBC Fragments Few     Target Cells Few     Ovalocytes Few    POCT GLUCOSE   Result Value Ref Range    POCT Glucose 62 (L) 74 - 99 mg/dL   POCT GLUCOSE   Result Value Ref Range    POCT Glucose 71 (L) 74 - 99 mg/dL   ECMO Arterial Blood Gas Unsolicited   Result Value Ref Range    POCT pH, Arterial ECMO 7.27 Reference range not established pH    POCT pCO2, Arterial ECMO 61 Reference range not established mm Hg    POCT pO2,  Arterial ECMO 397 Reference range not established mm Hg    POCT SO2, Arterial ECMO 100 Reference  range not established %    POCT Oxy Hemoglobin, Arterial ECMO 96.8 Reference range not established %    POCT Base Excess, Arterial ECMO -0.3 Reference range not established mmol/L    POCT HCO3 Calculated, Arterial ECMO 28.0 Reference range not established mmol/L    Patient Temperature 37.0 degrees Celsius   ECMO Venous Full Panel Unsolicited   Result Value Ref Range    POCT pH, Venous ECMO 7.29 Reference range not established pH    POCT pCO2, Venous ECMO 58 Reference range not established mm Hg    POCT pO2,  Venous  ECMO 48 Reference range not established mm Hg    POCT SO2, Venous ECMO 85 Reference range not established %    POCT Oxy Hemoglobin, Venous ECMO 82.0 Reference range not established %    POCT Hematocrit Calculated, Venous ECMO 23.0 (L) 36.0 - 46.0 %    POCT Sodium, Venous ECMO 134 (L) 136 - 145 mmol/L    POCT Potassium, Venous ECMO 4.5 3.5 - 5.3 mmol/L    POCT Chloride, Venous ECMO 103 98 - 107 mmol/L    POCT Ionized Calcium, Venous ECMO 1.11 1.10 - 1.33 mmol/L    POCT Glucose, Venous ECMO 90 74 - 99 mg/dL    POCT Lactate Venous ECMO 0.6 0.4 - 2.0 mmol/L    POCT Base Excess, Venous ECMO 1.0 Reference range not established mmol/L    POCT HCO3 Calculated, Venous ECMO 27.9 Reference range not established mmol/L    POCT Hemoglobin, Venous ECMO 7.5 (L) 12.0 - 16.0 g/dL    POCT Anion Gap, Venous ECMO 8 Reference range not established mmo/L    Patient Temperature 37.0 degrees Celsius   POCT GLUCOSE   Result Value Ref Range    POCT Glucose 109 (H) 74 - 99 mg/dL   Tacrolimus level   Result Value Ref Range    Tacrolimus  3.9 <=15.0 ng/mL   POCT GLUCOSE   Result Value Ref Range    POCT Glucose 125 (H) 74 - 99 mg/dL     *Note: Due to a large number of results and/or encounters for the requested time period, some results have not been displayed. A complete set of results can be found in Results Review.        Allergies  Dapagliflozin, Empagliflozin, Myfortic [mycophenolate sodium], Topiramate, and  Latex  Medications  Scheduled medications  acetaminophen, 650 mg, oral, q6h  calcium carbonate-vitamin D3, 1 tablet, oral, Daily  cyanocobalamin, 500 mcg, oral, Daily  darbepoetin floyd, 100 mcg, subcutaneous, q14 days  ferrous sulfate (325 mg ferrous sulfate), 65 mg of iron, oral, Daily with breakfast  folic acid, 1 mg, oral, Daily  insulin lispro, 0-15 Units, subcutaneous, Before meals & nightly  levothyroxine, 200 mcg, oral, Daily  lidocaine, 1 patch, transdermal, Daily  melatonin, 5 mg, oral, Nightly  melatonin, 5 mg, oral, Daily  multivitamin with minerals, 1 tablet, oral, Daily  mycophenolate, 360 mg, oral, BID  nystatin, 5 mL, Swish & Swallow, q6h  oxygen, , inhalation, Continuous - Inhalation  oxymetazoline, 2 spray, Left Nostril, q12h  pantoprazole, 40 mg, intravenous, Daily  piperacillin-tazobactam, 4.5 g, intravenous, q6h  predniSONE, 10 mg, oral, Daily  psyllium, 1 packet, oral, Daily  rosuvastatin, 40 mg, oral, Nightly  sennosides-docusate sodium, 1 tablet, oral, BID  sod phos di, mono-K phos mono, 500 mg, oral, BID  sodium chloride, 1 spray, Each Nostril, 4x daily  [Held by provider] sulfamethoxazole-trimethoprim, 80 mg of trimethoprim, oral, Daily  tacrolimus, 3 mg, oral, q12h TRICIA  traZODone, 25 mg, oral, Nightly  valGANciclovir, 450 mg, oral, q48h      Continuous medications  heparin, 200 Units/hr, Last Rate: 200 Units/hr (04/02/24 0930)  lactated Ringer's, 5 mL/hr, Last Rate: 5 mL/hr (04/02/24 0700)  PrismaSol 4/2.5, 25 mL/kg/hr  sodium bicarbonate infusion Impella Purge 25 mEq/1000 mL D5W, 10 mL/hr, Last Rate: 10 mL/hr (04/02/24 0700)      PRN medications  PRN medications: bisacodyl, calcium gluconate, calcium gluconate, dextrose, dextrose **OR** glucagon, HYDROmorphone, artificial tears, magnesium sulfate, magnesium sulfate, microfibrllar collagen, mupirocin, naloxone, ondansetron, oxyCODONE, sodium chloride     Assessment/Plan    Charla Yimi Bowles is a delightful 35 yo female who is a heart  transplant recpient (3/2023), presented on 2/15/24 with signs of acute cellular rejection on heart biopsy 2/16  and multiorgan dysfunction in the setting of ADHF. Multiple pressors started on admission. IABP started on 2/18.  Was on [facility]-ECMO 2/19-  , the impella 5.5 currently on P8. She has received pulse steroids, was maintained on tacrolimus/sirolimus, 2 sessions of IVIG/plasmapheresis on 2/18, 2/20 and 2 doses of Thymoglobulin 2/18 and 2/19.  Following an endomyocardial biopsy 2/20/2024 which was negative for rejection (in the setting of negative DSAs) - the Thymoglobulin/IVIG/plasmapheresis sessions were stopped. Then treated again with 5 treatments of IVIG/plasmapheresis from 3/5/24. Course c/b worsening cardiogenic shock requiring [facility] ECMO placement on 3/27/24. On 4/2/24, she has been listed Status 1 for Heart/Kidney transplant.     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 possible SLE cardiomyopathy)  #CHF with severely reduced EF s/p ICD  #Acute on chronic CHF requiring inotropic support  #Pulmonary HTN  #SLE  #Hypothyroidism  #Insomnia  #Nausea- resolved     #psychosocial support  - Music therapy  - Spiritual care support  - Art therapy      #Insomnia  -continue trazodone 25 mg @ 9 pm  -schedule melatonin 5 mg at 6 pm and 5 mg at 9 pm.       Plan of Care discussed with: Updated NP and bedside RN on symptom management.     Thank you for allowing us to care for this patient. Palliative Team will continue to follow as needed. Please contact team with any questions or concerns.   Team pager [phone] (weekdays)      Tommy Chaudhary MD [facility]  Palliative Medicine Physician  [email or web address]

## 2024-04-02 NOTE — PROGRESS NOTES
CTICU Progress Note  Charla Bowles/[id number]    Admit Date: 2/15/2024  Hospital Length of Stay: 47   ICU Length of Stay: 5d 12h   CT SURGEON: Dr. Bright    SUBJECTIVE:   Remains on VA ECMO ~ 3.5L/SW 0.5/100% and Impella 5.5 P4 1.7L.  Extubated yesterday, Room air today.   CVVH; net -590 ml.     MEDICATIONS  Infusions:  heparin, Last Rate: 100 Units/hr (04/02/24 0700)  lactated Ringer's, Last Rate: 5 mL/hr (04/02/24 0700)  PrismaSol 4/2.5  sodium bicarbonate infusion Impella Purge 25 mEq/1000 mL D5W, Last Rate: 10 mL/hr (04/02/24 0700)      Scheduled:  acetaminophen, 650 mg, q6h  calcium carbonate-vitamin D3, 1 tablet, Daily  [Held by provider] cephalexin, 500 mg, q12h TRICIA  cyanocobalamin, 500 mcg, Daily  darbepoetin floyd, 100 mcg, q14 days  ferrous sulfate (325 mg ferrous sulfate), 65 mg of iron, Daily with breakfast  folic acid, 1 mg, Daily  insulin lispro, 0-15 Units, q4h  levothyroxine, 200 mcg, Daily  lidocaine, 1 patch, Daily  melatonin, 5 mg, Nightly  multivitamin with iron-minerals, 15 mL, Daily  multivitamin with minerals, 1 tablet, Daily  [Held by provider] mycophenolate, 180 mg, BID  nystatin, 5 mL, q6h  oxygen, , Continuous - Inhalation  oxymetazoline, 2 spray, q12h  pantoprazole, 40 mg, Daily  piperacillin-tazobactam, 4.5 g, q6h  polyethylene glycol, 17 g, BID  potassium, sodium phosphates, 2 packet, q8h  predniSONE, 10 mg, Daily  sennosides-docusate sodium, 2 tablet, BID  sodium chloride, 1 spray, 4x daily  [Held by provider] sulfamethoxazole-trimethoprim, 80 mg of trimethoprim, Daily  tacrolimus, 3 mg, q24h  tacrolimus, 3 mg, q24h  traZODone, 25 mg, Nightly  valGANciclovir, 450 mg, q48h  vancomycin, 1,000 mg, Once  vancomycin, 1,000 mg, q12h      PRN:  bisacodyl, 10 mg, Daily PRN  calcium gluconate, 1 g, q6h PRN  calcium gluconate, 2 g, q6h PRN  dextrose, 25 g, q15 min PRN  dextrose, 25 g, q15 min PRN   Or  glucagon, 1 mg, q15 min PRN  HYDROmorphone, 0.2 mg, q2h PRN  HYDROmorphone, 0.4 mg, q2h  "PRN  artificial tears, 2 drop, PRN  magnesium sulfate, 2 g, q6h PRN  magnesium sulfate, 4 g, q6h PRN  microfibrllar collagen, , PRN  mupirocin, , BID PRN  naloxone, 0.2 mg, q5 min PRN  ondansetron, 4 mg, q4h PRN  oxyCODONE, 5 mg, q4h PRN  sodium chloride, 1 spray, 4x daily PRN  vancomycin, , Daily PRN        PHYSICAL EXAM:   Visit Vitals  BP 78/74   Pulse (!) 123   Temp 37.2 °C (99 °F) (Axillary)   Resp (!) 27   Ht 1.549 m (5' 0.98\")   Wt 90.3 kg (199 lb 1.2 oz)   SpO2 91%   BMI 37.63 kg/m²   OB Status Hysterectomy   Smoking Status Never   BSA 1.97 m²     Wt Readings from Last 5 Encounters:   04/01/24 90.3 kg (199 lb 1.2 oz)   12/07/23 92.1 kg (203 lb)   12/01/23 93 kg (205 lb)   11/29/23 92.9 kg (204 lb 12.8 oz)   11/09/23 91.3 kg (201 lb 3.2 oz)     INTAKE/OUTPUT:  I/O last 3 completed shifts:  In: 3709.5 (41.1 mL/kg) [I.V.:1149.5 (12.7 mL/kg); Blood:300; NG/GT:860; IV Piggyback:1400]  Out: 4787 (53 mL/kg) [Other:4787]  Weight: 90.3 kg          Vent settings:  Vent Mode: Pressure support  S RR:  [12] 12  S VT:  [350 mL] 350 mL  PEEP/CPAP (cm H2O):  [5 cm H20-10 cm H20] 5 cm H20  SC SUP:  [5 cm H20-8 cm H20] 5 cm H20  MAP (cm H2O):  [9.9-12] 12    Physical Exam:   - CONSTITUTION: Young appearing female intubated on ECMO with impella  - NEUROLOGIC: CAM negative. Following in all extremities. Alert and oriented.   - CARDIOVASCULAR: ST, R fem VA ECMO, no bleeding at site. R axillary impella without bleeding. Monophasic signals in LE  - RESPIRATORY: Room air. Course breath sounds.   - GI: Soft, hypoactive BS.  - : Anuric, on CVVH  - EXTREMITIES: Warm and dry. Edema improving  - SKIN: Left groin area of breakdown  - PSYCHIATRIC: Calm and cooperative.     Daily Risk Screen  Central line: access  Singh: n/s    Images: Reviewed.     Invasive Hemodynamics:    Most Recent Range Past 24hrs   BP (Art) 87/79 Arterial Line BP 1  Min: 55/52  Max: 99/84   MAP(Art) 81 mmHg Arterial Line MAP 1 (mmHg)   Min: 53 mmHg  Max: 266 mmHg "   RA/CVP   No data recorded   PA 41/34 No data recorded   PA(mean) 37 mmHg No data recorded   CO 5.5 L/min No data recorded   CI 2.8 L/min/m2 No data recorded   Mixed Venous 66.8 % SVO2 (%)  Min: 66 %  Max: 100 %   SVR  1115 (dyne*sec)/cm5 No data recorded     Impella:      Most Recent Range Past 24hrs   Performance Level 3 P Level  Min: 3   Min taken time: 04/02/24 1100  Max: 4   Max taken time: 04/02/24 1000   Flow (L/min) 1.3 Flow (L/min)  Min: 1.3   Min taken time: 04/02/24 1100  Max: 2.3   Max taken time: 04/01/24 2300   Motor Current 198/139 Motor Current  Min: 198/139   Min taken time: 04/02/24 1100  Max: 232/157   Max taken time: 04/02/24 0900   Placement Signal Yes  Placement OK could not be evaluated. This SmartLink does not work with rows of the type: Custom List   Purge (mmHg) 451 Purge Pressure (mmHg)  Min: 385   Min taken time: 04/01/24 1500  Max: 653   Max taken time: 04/01/24 1700   Purge rate (mL/hr) 11.7 Purge Rate (mL/hr)  Min: 11   Min taken time: 04/01/24 1900  Max: 12.2   Max taken time: 04/01/24 2300      ECMO:     Most Recent Range Past 24hrs   Flow 3.46 Patient Flow (L/min)  Min: 3.43  Max: 3.92   Speed 3166 Circuit Flow (RPM)  Min: 3164  Max: 3166   Sweep 0.5 Sweep Gas Flow Rate (L/min)  Min: 0.5  Max: 0.5          Assessment/Plan   34F with a PMHx sig for stage D HFrEF (PPCM) s/p ICD s/p CardioMEMs, hypothyroidism 2/2 thyroidectomy, obesity s/p gastric bypass (2017), and SLE who is s/p OHT (3/31/2022) with a post-OHT course complicated by RIJ/RUE DVTs, leukopenia, left groin seroma, and asymptomatic 2R ACR (11/2022) treated with steroids, s/p total hysterectomy (2023), Flu A (1/2024).     Originally presented to [facility] ED on 2/15/24 with complaints of N/V x 3 days and inability to keep medications down. Found to have acute systolic heart failure with primary graft failure and cardiogenic shock. Treated for suspected acute cellular vs. acute antibody mediated rejection; however, multiple  cardiac biopsies negative for signs of rejection or other causes of graft failure. Course has been complicated by multiple MCS devices for refractory cardiogenic shock (right femoral IABP: 2/18/24 - 3/1/24, Left femoral VA ECMO: 2/19/24 - 2/29/24, Right axillary impella 5.5: 3/1/24 - remains in place, 2nd right femoral VA ECMO: 3/27/24 - remains in place), ARF requiring RRT, acute liver injury, intubation due to volume overload in setting of pulmonary edema, severe hemolysis 2/2 to impella malposition, mild CMV viremia, bilateral provoked IJ DVTs, multiple episodes of epistaxis & coagulopathies requiring ongoing blood product transfusions.        Transferred to CTICU on 3/27/24 following right femoral VA ECMO placement due to worsening cardiogenic shock and multi-organ failure despite Impella 5.5 support and multiple inotropes.       Plan:  NEURO: No history. Previously neuro intact with acute pain and intermittent insomnia. Cam negative, Alert and oriented.  ->  - Serial neuro and pain assessments  - continue tylenol scheduled but will stop again if LFT uptrend.  - continue lidoderm patch for back pain  - Continue oxy to 5mg Q4 PRN.   - PRN dilaudid 0.2 mg q4hr   - Increase melatonin 10 mg HS (5 mg @ 1800, 5 mg @ 2100)  - continue trazodone 25 mg for sleep, Can get additional 25 mg tonight if needed   - PT/OT Consult; Goal to Sit edge of bed today  - CAM ICU score qshift. Sleep/wake cycle hygiene     ENT: Multiple episodes of epistaxis with interventions by ENT.  Most recently in OR 3/27 with L nare packed.  Packing removed yesterday, 4/1.   - appreciate ENT recs  - D/C keflex   - PRN ocean spray and mupiricin for dry nasal passages  - PRN afrin      Cardiac: Patient has a history of heart transplant in March of 2022 with primary graft dysfunction treated for acute cellular and antibody rejection without improvement with negative biopsy with multiple MCS devices for refractory cardiogenic shock (right femoral IABP:  2/18/24 - 3/1/24, Left femoral VA ECMO: 2/19/24 - 2/29/24, Right axillary impella 5.5: 3/1/24 - remains in place, 2nd right femoral VA ECMO: 3/27/24 - remains in place). Elevated LDH improved today on P4. Multiple attempts at repositioning impella previously.  ECHO 3/29 with impella potentially deep but not adjusted again. ECMO ~3.8L flow/100%/sweep 0.5, impella 5.5 P4 with flows ~1.8L with resolved lactate and improving end organ function, tolerating weaning with downtrending LDH. ST 120s.   -->  - Maintain goal MAP 70-90  - continue full BiV support with ECMO. Slow gradual wean of impella. Listed as status 1  - Resume home rosuvastatin 40mg daily  - continue as needed hydralazine for MAP >90  - trend LDH -> Downtrending   - Hold home amlodipine    Vascular: 3/27 arterial duplex with proximal SFA occlusin with collateral flow. C/f LLE ischemia (previous ECMO site) with loss of pulses- now monophasic with CK that had been normal and no longer trending. Feet warm with intact motor exam.   - appreciate vascular surgery following  - Consult Vascular Surgery for L SFA     PULM: No history. Intubated multiple times throughout hospital course for procedures; intubated 3/27 for OR. Improving acute respiratory failure persisting due to acute pulmonary edema which is now improving after aggressive volume removal. Appropriate peak and plateau pressures on ACVC with peep 10.  Tolerated pressure support trial 3/30 but apneic repeatedly 3/31.  Extubated yesterday to NC, Room air today.  -->  - CXR daily  - continue goal negative  - wean sweep  for normal pH  - Maintain SPO2 > 92%     GI: History of gastric bypass and MMF colitis. Shock liver originally resolved; most recently elevated r/t likely congestive hepatopathy that is improving on ECMO. Abdominal pain improved with reduced myfortic dosing. Previous c/f GI bleed, likely blood from epistaxis; no current evidence of GI bleeding. H pylori negative, fecal calprotectin  (elevated at 380), fecal lactoferrin (Positive 03/23/24). Having Daily BMs.  -->  - Continue calcitriol 0.5mg daily, multivitamin, calcium carbonate 1,250mg BID  - continue PPI  - avoiding placing dobhoff with epistaxis.   - Restart Diet- Carb controlled, Cardiac  - continue miralax BID & senna-colace BID - refusing BR    : No history, baseline Cr 1.2-1.3. HIRAM originally on CVVH then with renal recovery but then again worsened and now dialysis dependent and failed diuretic challenges. Hypervolemia improving with aggressive volume removal. Persisting hypophosphatemia despite repeated supplementation. Last 24 hours -590 ml. -->  - RFP as clinically indicated, replete electrolytes per CTICU protocol.   - continue CVVH for net-500 to 1L   - Sodium Phos 500 mg BID  - Nephrology Following     ENDO: History of hypothyroidism and prediabetes. TSH elevated originally to malabsorption then with dosing adjusted by endo; TSH (3/17): 20.74, T4 1.11, T3: 1.4. Steroid induced hyperglycemia acceptable glycemic control on SSI. Episode of hypoglycemia overnight. -->  - Maintain BG <180 with hypoglycemia protocol  - Continue SSI #3 AC/HS   - Continue Synthroid 200mcg daily.   - Endocrine following, will touch base today if any further monitoring needed     HEME: History of iron deficiency anemia and remote DVT; again with acute DVT LIJ and SVT left cephalic vein and right cephalic vein. Acute blood loss anemia with repeated transfusions with significant hemolysis but no evidence of active bleeding; last received pRBC 3/30. Thrombocytopenia persisting and again downtrending; last PF4 negative 3/30.  No epistaxis today.  -->    - Continue ferrous sulfate daily  - holding ASA for CAD with thrombocytopenia  - continue bicarb purge  - Increase systemic heparin to 200 units/hr and evaluate response. High risk with DVT and SFA occlusion but high risk for bleeding.   - SCDs and for DVT prophylaxis  - Aranesp every 2 weeks  - Last type and  screen: 3/31 -> Send tomorrow   - Avoid unnecessarily transfusing, HGB > 7.0     Rejection/prophylaxis: S/p heart transplant 3/31/2022. Induction basiliximab. Donor HCV -, toxo -/-, CMV -/+. Mild ACR with +CD4 and negative HLAs however clinical presentation concern for acute cellular vs AMR rejection/stuttering rejection completed courses of thymo/PLEX/IVIG without clinical improvement.   - Steroids: Continue PO prednisone 10 mg daily  - Tacrolimus: 3.0 mg BID w/ daily levels drawn @ 0600  - Tacrolimus goal troughs: 8-10  - Increase Myfortic acid: 360mg BID.  Not starting MMF d/t hx of colitis  - Antifungals: nystatin 500,000units Q6  - Antivirals: valcyte 450mg Q48hrs   - Anti PCP & Toxoplasmosis: holding Bactrim SS daily due to thrombocytopenia     ID: Afebrile. C/f previous yeast infection. BC 3/27 NGTD final. MRSA screen negative 3/28. Episode of hypotension on 4/1, pan cultured and empiric Vanco/Zosyn started.    -->  - Trend temp q4h  - Continue zosyn (4/1- )  - Follow up on cultures     Skin: Left groin wound from previous ECMO with wound consulted. Wound cx with NG 3/15.   - Preventative Mepilex dressings in place on sacrum and heels  - Change preventative Mepilex weekly or more frequently as indicated (when moist/soiled)   - Every shift skin assessment per nursing and weekly ICU skin rounds  - Moisture barrier to be applied with christopher care  - Active skin problems addressed with nursing on daily rounds  - wound recs from note 3/15 ordered      Proph:  SCDs  low systemic heparin  PPI     G: Lines  RIJ Trialysis - 3/5. Limited options for replacement with DVTs.    R radial maxwell 3/27  PIV x2     Restraints: Not indicated     Code status: Full Code     I personally spent 45 minutes of critical care time directly and personally managing the patient exclusive of separately billable procedures.     A,B,C,D,E,F,G: reviewed  Discussed with Dr. Lee.       Dispo: CTICU care for now.    CTICU TEAM PHONE [phone]

## 2024-04-02 NOTE — TELEPHONE ENCOUNTER
Patient listed in [facility] as Status 1 SHK.     Called patient to notify her that she has been listed for SHK. Patient verbalized understanding and will let her mom know.

## 2024-04-02 NOTE — PROGRESS NOTES
Vancomycin Dosing by Pharmacy- Cessation of Therapy    Consult to pharmacy for vancomycin dosing has been discontinued by the prescriber, pharmacy will sign off at this time.    Please call pharmacy if there are further questions or re-enter a consult if vancomycin is resumed.     Jeyson Streeter, PharmD

## 2024-04-02 NOTE — PROGRESS NOTES
"4/2 Review:   3/27 @ los 41 returned to CTICU for \" requiring escalation to mechanical circulatory support today with re-initiation of VA ECMO.\" Supported by Impella 5.5. ESRD & on dialysis. On 4/1 extubated. Committee: \"Has met criteria for kidney transplant listing s/p RRT x 6 weeks as of 3/29/24. Abdominal Transplant team will formally approve for kidney transplantation pending approval for heart transplantation.  - Heart Transplant committee  formally approved for heart transplantation as status 1 on 4/2/24.\"      Claribel Daiz (LSW, MSW)     "

## 2024-04-02 NOTE — PROGRESS NOTES
Occupational Therapy    Re-Evaluation and Treatment    Patient Name: Charla Bowles  MRN: [id number]  Today's Date: 4/2/2024  Time Calculation  Start Time: 1415  Stop Time: 1445  Time Calculation (min): 30 min    Assessment  IP OT Assessment  OT Assessment: Pt is a 34 year old female who demonstrates decreased strength, balance, activity tolerance, coordination, and cognition, which impedes occupational performance  Prognosis: Good  Barriers to Discharge: Inaccessible home environment  Evaluation/Treatment Tolerance: Patient tolerated treatment well  Medical Staff Made Aware: Yes  End of Session Communication: Bedside nurse, Physician  End of Session Patient Position: Bed, 3 rail up, Alarm off, not on at start of session, Alarm off, caregiver present  Plan:  Treatment Interventions: ADL retraining, Functional transfer training, Endurance training, UE strengthening/ROM, Cognitive reorientation, Patient/family training, Equipment evaluation/education, Neuromuscular reeducation, Fine motor coordination activities, Compensatory technique education  OT Frequency: 4 times per week  OT Discharge Recommendations: High intensity level of continued care  Equipment Recommended upon Discharge:  (TBD)  OT Recommended Transfer Status: Assist of 2  OT - OK to Discharge: Yes    Subjective   Current Problem:    General:  Reason for Referral: Re-eval as Pt transferred to CTICU on 3/27/24 following right femoral VA ECMO placement due to worsening cardiogenic shock and multi-organ failure despite Impella 5.5 support and multiple inotropes. Now working toward duel Heart-Kidney transplantation.  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 Right axillary impella (P-3), flow 1.8. R femoral VA ECMO 100% FiO2, sweep .5, flow 3.50, CVVH through ECMO. Sites examined pre, during and post mobility. Provider, RN, and perfusionist in room for entirety of session.     Precautions:  Hearing/Visual Limitations: WFL  Medical Precautions: Cardiac precautions, Fall precautions  Precautions Comment: R axillary impella precautions- no pushing/pulling with R UE, No lifting R UE above shoulder height, no R UE humeral EXT past plane of body, R femoral ECMO precautions, MAP 70-90, protective precautions    Vital Signs:  Heart Rate: (!) 125 (Post: 123)  Resp: (!) 37 (Post: 40)  SpO2: 96 % (Post: 99)  BP: 96/86 (Post: 109/81)  MAP (mmHg): 90 (post: 89)  BP Location:  (femoral IABP)    Pain:  Pain Assessment  Pain Assessment:  (reported back pain, did not quantify)  Pain Score: 0 - No pain  Lines/Tubes/Drains:  ECMO Cannulation Peripheral Right;Femoral artery;Femoral vein (Active)   Number of days: 5       Arterial Line 03/27/24 Right Radial (Active)   Number of days: 6       Ventricular Assist Device Impella 5.5 (Active)   Number of days: 31       Hemodialysis Cath Triple lumen Right Jugular (Active)   Number of days: 23         Objective   Cognition:  Overall Cognitive Status: Within Functional Limits  Arousal/Alertness: Delayed responses to stimuli  Orientation Level: Oriented X4  Cognition Comments: mild confusion regarding situation  Attention: Exceptions to WFL  Alternating Attention: Impaired  Sustained Attention: Impaired  Memory: Exceptions to WFL  Short-Term Memory: Impaired  Safety/Judgement: Exceptions to WFL  Insight: Moderate  Impulsive: Moderately  Processing Speed: Delayed     Confusion Assessment Method  (CAM)  Acute Onset and Fluctuating Course (1A): No  Acute Onset and Fluctuating Course (1B): Yes  Inattention (2): No  Disorganized Thinking (3): No  Rate Patient's Level of Consciousness (4): Alert (Normal), No  Delirium Present: No     Home Living:  Type of Home: House  Lives With:  (mother; 4 children)  Home Adaptive Equipment: None  Home Layout: Two level (Pt plans to stay on first floor in living room and sponge bathe)  Home Access: Stairs to enter with rails  Entrance Stairs-Number of Steps: 3  Bathroom Shower/Tub:  (plans to sponge bathe on 1st floor)  Bathroom Equipment: Bedside commode     Prior Function:  Level of [place]: Independent with ADLs and functional transfers  ADL Assistance: Independent  Homemaking Assistance: Independent  Ambulatory Assistance: Independent  Vocational: On disability  Hand Dominance: Right  Prior Function Comments: +drives     ADL:  Eating Assistance: Stand by  Eating Deficit: Setup  Grooming Assistance: Stand by  Grooming Deficit: Setup  Bathing Assistance: Moderate  UE Dressing Assistance: Minimal  UE Dressing Deficit:    LE Dressing Assistance: Total  LE Dressing Deficit:   Toileting Assistance with Device: Total  Toileting Deficit:      Activity Tolerance:  Endurance: Tolerates less than 10 min exercise, no significant change in vital signs  Early Mobility/Exercise Safety Screen: Proceed with mobilization - No exclusion criteria met    Bed Mobility/Transfers: Bed Mobility/Transfers: Bed Mobility  Bed Mobility: Yes (Perfusionist, RN, CNP PT, OT present during session.)  Bed Mobility 1  Bed Mobility 1: Supine to sitting  Level of Assistance 1: Maximum assistance (x2)  Bed Mobility Comments 1: HOB elevated, draw sheet  Bed Mobility 2  Bed Mobility  2: Sitting to supine  Level of Assistance 2: Dependent (x2)       Vision: Vision - Basic Assessment  Current Vision: No visual deficits      Sensation:  Light Touch: No apparent deficits  Sensation Comment:  WFL    Strength:  Strength Comments: FABIANA WFL, RUE not formally assessed d/t impella, appears >/=3/5    Hand Function:  Hand Function  Gross Grasp: Functional  Coordination: Functional      Treatment Completed on Evaluation    Therapy/Activity:Therapeutic Activity  Therapeutic Activity Performed: Yes  Therapeutic Activity 1: Prior to progressing to EOB, HOB steadily increased to 55 degrees to monitor activity tolerance and hemodynamic stability prior to progressing to EOB  Therapeutic Activity 2: Pt sat EOB ~10 minutes. Primarily max A with posterior lean on therapist, tolerated x3 bouts of anterior leans requiring CGA ~10 seconds each. Flow with mild decrease per perfusionist, otherwise VSS throughout. Pt with frequent attempts to pull on bedrail with RUE, required multiple cues for adherence to precautions.  Therapeutic Activity 3: While seated EOB, pt tolerated back/neck ROM exercises including scapular retraction, shoulder rolls, and cervical rotation.    Outcome Measures: [facility] Daily Activity  Putting on and taking off regular lower body clothing: Total  Bathing (including washing, rinsing, drying): A lot  Putting on and taking off regular upper body clothing: A little  Toileting, which includes using toilet, bedpan or urinal: Total  Taking care of personal grooming such as brushing teeth: A little  Eating Meals: A little  Daily Activity - Total Score: 13        ICU Mobility Screen  Early Mobility/Exercise Safety Screen: Proceed with mobilization - No exclusion criteria met,        Education Documentation  Handouts, taught by Marcia Thomason OT at 4/2/2024  4:09 PM.  Learner: Patient  Readiness: Acceptance  Method: Explanation, Demonstration  Response: Verbalizes Understanding    Body Mechanics, taught by Marcia Thomason OT at 4/2/2024  4:09 PM.  Learner: Patient  Readiness: Acceptance  Method: Explanation, Demonstration  Response: Verbalizes Understanding    Precautions, taught by Marcia  Gillian Thomason OT at 4/2/2024  4:09 PM.  Learner: Patient  Readiness: Acceptance  Method: Explanation, Demonstration  Response: Verbalizes Understanding    Home Exercise Program, taught by Marcia Thoamson OT at 4/2/2024  4:09 PM.  Learner: Patient  Readiness: Acceptance  Method: Explanation, Demonstration  Response: Verbalizes Understanding    ADL Training, taught by Marcia Thomason OT at 4/2/2024  4:09 PM.  Learner: Patient  Readiness: Acceptance  Method: Explanation, Demonstration  Response: Verbalizes Understanding    Education Comments  No comments found.        Goals:   Encounter Problems       Encounter Problems (Active)       ADLs       Patient will complete daily grooming tasks in standing with LRAD with supervision level of assistance and PRN adaptive equipment. (Progressing)       Start:  03/01/24    Expected End:  04/16/24               ADLs       Patient with complete lower body dressing with stand by assist level of assistance donning and doffing all LE clothes  with PRN adaptive equipment (Not met)       Start:  03/04/24    Expected End:  03/18/24    Resolved:  04/02/24    Updated to: Patient with complete UB bathing with stand by assist level of assistance  with PRN adaptive equipment    Update reason: re eval          Patient will complete toileting including hygiene clothing management/hygiene with stand by assist level of assistance. (Not met)       Start:  03/04/24    Expected End:  03/18/24    Resolved:  04/02/24    Updated to: Patient will complete upper body dressing including with mod I level of assistance.    Update reason: re eval         Patient with complete UB bathing with stand by assist level of assistance  with PRN adaptive equipment       Start:  04/02/24    Expected End:  04/16/24                Patient will complete upper body dressing including with mod I level of assistance.       Start:  04/02/24    Expected End:  04/16/24                   BALANCE       Pt  will increase dynamic standing tolerance to >10 min with supervision using LRAD during functional mobility/ADLs without LOB in order to improve activity tolerance and balance for self-care tasks.  (Progressing)       Start:  03/01/24    Expected End:  04/16/24               COGNITION/SAFETY       Patient will participate in cognitive activities to demonstrate WFL score on further cognitive assessments, including Medi-Cog, MoCA and remain A&O x3, CAM (-).  (Progressing)       Start:  03/01/24    Expected End:  04/16/24               EXERCISE/STRENGTHENING       Patient will be educated on BUE HEP for increased ADL/IADL performance. (Progressing)       Start:  03/01/24    Expected End:  04/16/24               MOBILITY       Patient will perform Functional mobility max Household distances/Community Distances with supervision level of assistance and least restrictive device in order to improve safety and functional mobility. (Progressing)       Start:  03/01/24    Expected End:  04/16/24 04/02/24 at 4:11 PM   Marcia Thomason, OT   Rehab Office: [phone]

## 2024-04-02 NOTE — CARE PLAN
The patient's goals for the shift include  Continue to run fluid negative on CVVH, progressive mobility.    The clinical goals for the shift include Continue to monitor for hemodynamic stability, monitor for signs/symptoms of bleeding.    Over the shift, the patient did not make progress toward the following goals. Barriers to progression include pt states that she did not sleep overnite and requests meds to help her rest at night . Recommendations to address these barriers include ICU team to follow up and address sleep needs.

## 2024-04-02 NOTE — PROGRESS NOTES
Transplant Candidate Pharmacist Screening Note    The patient's reported home medications and current inpatient medications have been reviewed.     Current Facility-Administered Medications on File Prior to Visit   Medication Dose Route Frequency Provider Last Rate Last Admin    acetaminophen (Tylenol) tablet 650 mg  650 mg oral q6h DIANA Berg   650 mg at 04/02/24 0939    [COMPLETED] albumin human 25 % solution 25 g  25 g intravenous Once DIANA Berg   Stopped at 04/01/24 1515    bisacodyl (Dulcolax) suppository 10 mg  10 mg rectal Daily PRN DIANA Berg        calcium carbonate-vitamin D3 500 mg-5 mcg (200 unit) per tablet 1 tablet  1 tablet oral Daily Franca JIMMY Howe-CNP   1 tablet at 04/02/24 0926    calcium gluconate in NS IV 1 g  1 g intravenous q6h PRN Samantha PALACIO Hutchinson, DO   Stopped at 04/02/24 0306    calcium gluconate in NS IV 2 g  2 g intravenous q6h PRN Samantha Hutchinson, DO   Stopped at 03/27/24 2129    cyanocobalamin (Vitamin B-12) tablet 500 mcg  500 mcg oral Daily Franca JIMMY Howe-CNP   500 mcg at 04/02/24 0927    darbepoetin floyd (Aranesp) injection 100 mcg  100 mcg subcutaneous q14 days JIMMY Downey-CNP   100 mcg at 03/20/24 0821    dextrose 50 % injection 25 g  25 g intravenous q15 min PRN Amira Adams APRN-CNP        dextrose 50 % injection 25 g  25 g intravenous q15 min PRN Samantha PALACIO Hutchinson, DO        Or    glucagon (Glucagen) injection 1 mg  1 mg intramuscular q15 min PRN Samantha H Hutchinson, DO        ferrous sulfate (325 mg ferrous sulfate) tablet 1 tablet  65 mg of iron oral Daily with breakfast JIMMY Downey-CNP   1 tablet at 04/02/24 0925    folic acid (Folvite) tablet 1 mg  1 mg oral Daily JIMMY Downey-CNP   1 mg at 04/02/24 0925    heparin 25,000 Units in dextrose 5% 250 mL (100 Units/mL) infusion (premix)  200 Units/hr intravenous Continuous JIMMY Mistry-CNP 2 mL/hr at 04/02/24 0930 200 Units/hr at  04/02/24 0930    HYDROmorphone (Dilaudid) injection 0.2 mg  0.2 mg intravenous q4h PRN Belkys R Head, APRN-CNP        insulin lispro (HumaLOG) injection 0-15 Units  0-15 Units subcutaneous Before meals & nightly Belkys R Head, APRLUCAS-CNP        lactated Ringer's infusion  5 mL/hr intravenous Continuous Samantha Hutchinson, DO 5 mL/hr at 04/02/24 0700 5 mL/hr at 04/02/24 0700    levothyroxine (Synthroid, Levoxyl) tablet 200 mcg  200 mcg oral Daily DIANA Downey   200 mcg at 04/02/24 0331    lidocaine 4 % patch 1 patch  1 patch transdermal Daily DIANA Downey   1 patch at 04/02/24 0931    lubricating eye drops ophthalmic solution 2 drop  2 drop Both Eyes PRN DIANA Downey        magnesium sulfate IV 2 g  2 g intravenous q6h PRN Samantha Hutchinson, DO        magnesium sulfate IV 4 g  4 g intravenous q6h PRN Samantha Hutchinson, DO        melatonin tablet 5 mg  5 mg oral Nightly Belkys R Head, JIMMY-CNP        melatonin tablet 5 mg  5 mg oral Daily Belkys R Head, DIANA        microfibrllar collagen (Hemostat) pad   Topical PRN DIANA Downey        multivitamin with minerals 1 tablet  1 tablet oral Daily DIANA Downey   1 tablet at 04/02/24 0925    mupirocin (Bactroban) 2 % ointment   Topical BID PRN DIANA Downey        mycophenolate (Myfortic) EC tablet 360 mg  360 mg oral BID DIANA Ruelas        naloxone (Narcan) injection 0.2 mg  0.2 mg intravenous q5 min PRN Samantha Hutchinson, DO        nystatin (Mycostatin) 100,000 unit/mL suspension 500,000 Units  5 mL Swish & Swallow q6h DIANA Downey   500,000 Units at 04/02/24 1255    ondansetron (Zofran) injection 4 mg  4 mg intravenous q4h PRN DIANA Downey   4 mg at 04/01/24 2015    oxyCODONE (Roxicodone) immediate release tablet 5 mg  5 mg oral q4h PRN Belkys Gabriel, APRN-CNP        oxygen (O2) therapy   inhalation Continuous - Inhalation Charity Carlisle, JIMMY-CNP   4 L/min at  04/01/24 2000    oxymetazoline (Afrin) 0.05 % nasal spray 2 spray  2 spray Left Nostril q12h DIANA Berg        pantoprazole (ProtoNix) injection 40 mg  40 mg intravenous Daily Franca ALCANTARA Gold, APRN-CNP   40 mg at 04/02/24 0925    piperacillin-tazobactam-dextrose (Zosyn) IV 4.5 g  4.5 g intravenous q6h DIANA Berg   Stopped at 04/02/24 1040    predniSONE (Deltasone) tablet 10 mg  10 mg oral Daily DIANA Downey   10 mg at 04/02/24 0925    PrismaSol 4/2.5 CRRT solution  25 mL/kg/hr CRRT Continuous Kuldip Hernandes MD        psyllium (Metamucil) 3.4 gram packet 1 packet  1 packet oral Daily Belkys R Head APRN-CNP        rosuvastatin (Crestor) tablet 40 mg  40 mg oral Nightly Belkys R Head APRN-CNP        sennosides-docusate sodium (Patti-Colace) 8.6-50 mg per tablet 1 tablet  1 tablet oral BID Belkys R Head APRN-CNP        sod phos di, mono-K phos mono (K Phos Neutral) tablet 500 mg  500 mg oral BID Belkys R Head APRN-CNP   500 mg at 04/02/24 1224    sodium bicarbonate infusion Impella Purge 25 mEq/1000 mL D5W  10 mL/hr intra-catheter Continuous DIANA Berg 10 mL/hr at 04/02/24 0700 10 mL/hr at 04/02/24 0700    sodium chloride (Ocean) 0.65 % nasal spray 1 spray  1 spray Each Nostril 4x daily PRN DIANA Downey   1 spray at 03/11/24 0908    sodium chloride (Ocean) 0.65 % nasal spray 1 spray  1 spray Each Nostril 4x daily DIANA Berg   1 spray at 04/01/24 2100    [COMPLETED] sodium phosphate 15 mmol in sodium chloride 0.9% 250 mL IV  15 mmol intravenous Once Mike Mcneil MD   Stopped at 04/02/24 0636    [Held by provider] sulfamethoxazole-trimethoprim (Bactrim) 200-40 mg/5 mL suspension 80 mg of trimethoprim  80 mg of trimethoprim oral Daily DIANA Downey   80 mg of trimethoprim at 02/22/24 0950    [COMPLETED] tacrolimus (Prograf) capsule 1.5 mg  1.5 mg sublingual Once JIMMY Berg-GOLDIE    1.5 mg at 04/01/24 1833    tacrolimus (Prograf) capsule 3 mg  3 mg oral q12h [facility] JIMMY Ruelas-GOLDIE        [COMPLETED] tacrolimus (Prograf) oral suspension 1.5 mg  1.5 mg oral Once DIANA Berg   1.5 mg at 04/01/24 1530    traZODone (Desyrel) tablet 25 mg  25 mg oral Nightly DIANA Downey   25 mg at 04/01/24 2015    [COMPLETED] traZODone (Desyrel) tablet 25 mg  25 mg oral Once Mike Mcneil MD   25 mg at 04/02/24 0236    valGANciclovir (Valcyte) 50 mg/mL oral solution 450 mg  450 mg oral q48h DIANA Berg   450 mg at 04/01/24 0937    [COMPLETED] vancomycin (Vancocin) 1,000 mg in dextrose 5% water 200 mL  1,000 mg intravenous Once DIANA Berg   Stopped at 04/01/24 1709     Current Outpatient Medications on File Prior to Visit   Medication Sig Dispense Refill    Alcohol Prep Pads pads, medicated       amLODIPine (Norvasc) 2.5 mg tablet TAKE ONE (1) TABLET BY MOUTH ONCE DAILY 30 tablet 11    aspirin 81 mg EC tablet TAKE ONE (1) TABLET BY MOUTH ONCE A DAY 30 tablet 10    blood sugar diagnostic (OneTouch Verio test strips) strip 4 times a day.      calcitriol (Rocaltrol) 0.5 mcg capsule TAKE ONE (1) CAPSULE BY MOUTH ONCE DAILY. 90 capsule 3    calcium carbonate (Oscal) 500 mg calcium (1,250 mg) tablet TAKE ONE (1) TABLET BY MOUTH TWICE DAILY. TAKE AT LEAST 2 HOURS BEFORE OR 2 HOURS AFTER IMMUNOSUPPRESSION MEDICATIONS. 60 tablet 11    docusate sodium (Colace) 100 mg capsule Take 1 capsule (100 mg) by mouth 2 times a day as needed.      ferrous sulfate, 325 mg ferrous sulfate, tablet TAKE ONE (1) TABLET BY MOUTH DAILY. 90 tablet 3    insulin lispro (HumaLOG) 100 unit/mL injection USE FOR SLIDING SCALE INSULIN COVERAGE UP TO 4 TIMES DAILY AS DIRECTED. MAX OF 40 UNITS PER DAY. 15 mL 11    levothyroxine (Synthroid, Levoxyl) 200 mcg tablet TAKE ONE (1) TABLET BY MOUTH ONCE DAILY. (Patient taking differently: Take 2 tablets (400 mcg) by mouth once daily.) 90  tablet 3    multivitamin tablet Take 1 tablet by mouth once daily.      pantoprazole (ProtoNix) 40 mg EC tablet TAKE ONE (1) TABLET BY MOUTH DAILY. 30 tablet 11    predniSONE (Deltasone) 5 mg tablet TAKE ONE (1) TABLET BY MOUTH ONCE DAILY. 90 tablet 3    rosuvastatin (Crestor) 40 mg tablet TAKE ONE (1) TABLET BY MOUTH DAILY. 90 tablet 3    sirolimus (Rapamune) 0.5 mg tablet Take 1 tablet (0.5 mg) by mouth once daily. 90 tablet 3    tacrolimus (Prograf) 0.5 mg capsule Take 1 capsule (0.5 mg) by mouth 2 times a day. 60 capsule 11    tacrolimus (Prograf) 1 mg capsule Take 1 capsule (1 mg) by mouth 2 times a day. 60 capsule 3       Based on this information, there are no pharmacologic contraindications to heart or kidney transplantation.    Brenden Souza, PharmD, BCPS, BCTXP   Solid Organ Transplant Clinical Pharmacy Specialist

## 2024-04-02 NOTE — COMMITTEE REVIEW
Presentation for Listing Note      Evaluation Date: 3/5/2024  Committee Review Date: 4/2/2024    Organ being evaluated for: Heart    Transplant Phase: Evaluation  Transplant Status: Active    Referring Physician: Jeyson Cornell    Primary Diagnosis: Dilated Myopathy: Post Partum  Secondary Diagnosis: Cardiogenic Shock    Committee Review Decision: Approved      Committee Discussion Details:   The candidate's evaluation was presented and discussed at the Advanced Heart Failure Therapeutics Committee. After review of the candidate's diagnosis and the evaluations of the multidisciplinary team members, it was the consensus of the Selection Committee that the candidate does meet Heart Selection Criteria and is Approved for dual organ Heart and kidney transplant. She will be listed as a UNOS status 1.     Physician: Patient has received multiple transfusions in the setting of mechanical support. Will repeat PRAs today and frequently recheck for possible sensitization. Team will allow for a lower hemoglobin to avoid multiple transfusions. Will need prospective crossmatch.   Surgeon: Patient currently on ECMO. She will need vascular follow up for DVT management and to further review SFA occlusion at left ECMO femoral cannulation site.  Social Work: Patient well known to social work and transplant coordinators prior to this admission. Extremely compliant with medications and management. She is known to be meticulous with her care. Original transplant support system remains in place.   Dietician: No concerns  Pharmacist: Patient is intolerant of Mycophenolate/Myfortic, so team will plan for IMS regimen of Tacrolimus/Sirolimus/Steroids  Transplant Coordinator: No concerns, patient has been compliant and easy to manage outpatient.   : Will require listing authorization.    Evaluation for:    []LVAD [x] Transplant     Approved for:    [x] Heart Transplant (with DDKT)  [] Short-Term VAD  [] Long-Term  "VAD    Transplant Listing:  Status: 1  Listing diagnosis: Heart Re-Txp: Primary Failure  Induction plan: Thymoglobulin  Current height: 154.9cm (5'1\")  Min height: TBD  Current weight: 90.3kg (199lbs 1.2oz)  Min weight: TBD  Prospective crossmatch:     [x] yes [] no             "

## 2024-04-02 NOTE — PROGRESS NOTES
Vancomycin Dosing by Pharmacy- INITIAL    Charla REGINALD Bowles is a 34 y.o. year old female who Pharmacy has been consulted for vancomycin dosing for pneumonia. Based on the patient's indication and renal status this patient will be dosed based on a goal trough/random level of 15-20.     Patient currently on CVVH. Follow up nephrology notes for plan.     Visit Vitals  BP 78/74   Pulse (!) 123   Temp 37.2 °C (99 °F) (Axillary)   Resp (!) 27        Lab Results   Component Value Date    CREATININE 0.82 04/02/2024    CREATININE 0.85 04/01/2024    CREATININE 0.80 04/01/2024    CREATININE 0.85 03/31/2024        I/O last 3 completed shifts:  In: 3459.5 (38.3 mL/kg) [I.V.:1149.5 (12.7 mL/kg); Blood:300; NG/GT:860; IV Piggyback:1150]  Out: 4787 (53 mL/kg) [Other:4787]  Weight: 90.3 kg   [unfilled]    Lab Results   Component Value Date    PATIENTTEMP 37.0 04/02/2024    PATIENTTEMP 37.0 04/02/2024    PATIENTTEMP 37.0 04/02/2024          Assessment/Plan    Will initiate vancomycin maintenance,  1000 mg every 12 hours. (CVVH dosing)       Follow-up level will be ordered on 4/3 at ~0700 unless clinically indicated sooner.  Will continue to monitor renal function daily while on vancomycin and order serum creatinine at least every 48 hours if not already ordered.  Follow for continued vancomycin needs, clinical response, and signs/symptoms of toxicity.       Jeyson Streeter, PharmD

## 2024-04-02 NOTE — PROGRESS NOTES
ECMO Progress Note    Patient requires ECMO support. Drainage cannula site, cannula, pump, oxygenator, return cannula and return cannula site clinically inspected. RPM and sweep reviewed and adjusted appropriate to clinical context. Anticoagulation status and intensity discussed. Plan for weaning, next clinical steps discussed with team and family. Discussed with cardiothoracic surgeon, perfusion, palliative care and physical/occupational therapy    ECMO Indication: Heart failure due to concern for rejection  ECMO Cannula Configuration: R femoral VA  ECMO circuit run from drainage cannula to pump to oxygenator to return cannula: Completed  Presence of cannula bleeding: No ongoing bleeding  Presence of oxygenator clot: No appreciable clots  Pre/post PaO2 adequate: Appropriate incrementation  LV Unloading/Pulse Pressure: Impella at P4  Distal Perfusion: Reperfusion cannula in place  Anticoagulation Plan/Monitoring: Heparin drip slowly increasing to goal  Mobility Plan/Candidacy: Will promote mobility with PT/OT  Path to decannulation/anticipated decannulation date: Plan for transplant relisting    ECMO:     Most Recent Range Past 24hrs   Flow 3.46 Patient Flow (L/min)  Min: 3.43  Max: 3.92   Speed 3166 Circuit Flow (RPM)  Min: 3164  Max: 3166   Sweep 0.5 Sweep Gas Flow Rate (L/min)  Min: 0.5  Max: 0.5       heparin, 200 Units/hr, Last Rate: 200 Units/hr (04/02/24 0930)  lactated Ringer's, 5 mL/hr, Last Rate: 5 mL/hr (04/02/24 0700)  PrismaSol 4/2.5, 25 mL/kg/hr  sodium bicarbonate infusion Impella Purge 25 mEq/1000 mL D5W, 10 mL/hr, Last Rate: 10 mL/hr (04/02/24 0700)        Vent Mode: Pressure support  S RR:  [12] 12  S VT:  [350 mL] 350 mL  PEEP/CPAP (cm H2O):  [5 cm H20-10 cm H20] 5 cm H20  MD SUP:  [5 cm H20] 5 cm H20  MAP (cm H2O):  [12] 12    Feliciano Lee MD  Emergency Medicine-Critical Care Attending Physician

## 2024-04-02 NOTE — PROGRESS NOTES
Vancomycin Dosing by Pharmacy- FOLLOW UP    Charla Bowles is a 34 y.o. year old female who Pharmacy has been consulted for vancomycin dosing for pneumonia. Based on the patient's indication and renal status this patient is being dosed based on a goal AUC of 400-600.     Renal function is currently stable.    Current vancomycin dose: 1500 mg given every 12 hours    Estimated vancomycin AUC on current dose: 582 mg/L.hr     Visit Vitals  BP 78/74   Pulse (!) 123   Temp 37.2 °C (99 °F) (Axillary)   Resp (!) 27        Lab Results   Component Value Date    CREATININE 0.82 04/02/2024    CREATININE 0.85 04/01/2024    CREATININE 0.80 04/01/2024    CREATININE 0.85 03/31/2024        I/O last 3 completed shifts:  In: 3459.5 (38.3 mL/kg) [I.V.:1149.5 (12.7 mL/kg); Blood:300; NG/GT:860; IV Piggyback:1150]  Out: 4787 (53 mL/kg) [Other:4787]  Weight: 90.3 kg       Lab Results   Component Value Date    PATIENTTEMP 37.0 04/02/2024    PATIENTTEMP 37.0 04/02/2024    PATIENTTEMP 37.0 04/02/2024        Assessment/Plan    Within goal AUC range. Continue current vancomycin regimen.    This dosing regimen is predicted by [employer] to result in the following pharmacokinetic parameters:    Loading dose: N/A  Regimen: 1500 mg IV every 12 hours.  Start time: 07:11 on 04/02/2024  Exposure target: AUC24 (range)400-600 mg/L.hr   AUC24,ss: 582 mg/L.hr  Probability of AUC24 > 400: 85 %  Ctrough,ss: 17.6 mg/L  Probability of Ctrough,ss > 20: 40 %  Probability of nephrotoxicity (Lodise MANNY 2009): 13 %      The next level will be obtained on 4/3 at AM labs. May be obtained sooner if clinically indicated.   Will continue to monitor renal function daily while on vancomycin and order serum creatinine at least every 48 hours if not already ordered.  Follow for continued vancomycin needs, clinical response, and signs/symptoms of toxicity.       Jeyson Streeter, PharmD

## 2024-04-02 NOTE — PROGRESS NOTES
"Charla Bowles is a 34 y.o. female on day 47 of admission presenting with Cardiogenic shock (CMS/HCC).    Subjective   Patient was seen at bedside.  Reported that she was feeling cold.  Denies any acute complaints.    Objective   Constitutional: Young -American female, alert and oriented x 3, no acute distress, cooperative.    Skin/Hair: Dry skin.  HEENT: EOMI, Anicteric scleras   Neck: Right IJ in place  Cardiovascular: Rapid heart rate  Respiratory: no increased wob,  or accessory muscle use.  Neuro: No delay in deep tendon reflexes/not brisk  Psych : appropriate affect       Last Recorded Vitals  Blood pressure 78/74, pulse (!) 123, temperature 35.7 °C (96.3 °F), temperature source Axillary, resp. rate (!) 49, height 1.549 m (5' 0.98\"), weight 90.3 kg (199 lb 1.2 oz), SpO2 91 %.  Intake/Output last 3 Shifts:  I/O last 3 completed shifts:  In: 3709.5 (41.1 mL/kg) [I.V.:1149.5 (12.7 mL/kg); Blood:300; NG/GT:860; IV Piggyback:1400]  Out: 4787 (53 mL/kg) [Other:4787]  Weight: 90.3 kg     Relevant Results  Results from last 7 days   Lab Units 04/02/24  0906 04/02/24  0348 04/02/24  0036 04/02/24  0014 04/02/24  0008 04/01/24  1943 04/01/24  1213 04/01/24  1212 04/01/24  0850 04/01/24  0233 03/31/24  1204 03/31/24  1156 03/31/24  0807 03/31/24  0514   POCT GLUCOSE mg/dL 125* 109* 71* 62*  --  159*   < >  --    < >  --    < >  --    < >  --    GLUCOSE mg/dL  --   --   --   --  63*  --   --  186*  --  136*  --  170*  --  115*    < > = values in this interval not displayed.     Lab Results   Component Value Date    TSH 10.13 (H) 04/01/2024    TSH 20.74 (H) 03/18/2024    TSH 15.88 (H) 03/17/2024    TSH 14.87 (H) 03/07/2024    TSH 35.92 (H) 03/04/2024    TSH 12.02 (H) 02/16/2024    TSH 15.16 (H) 02/15/2024    TSH 21.69 (H) 07/18/2023    TSH 6.13 (H) 03/17/2023    TSH 2.50 01/04/2023    TSH 40.64 (H) 12/14/2022    TSH 2.70 08/08/2022    TSH 0.61 07/12/2022    TSH 0.20 (L) 05/19/2022    TSH 2.62 04/25/2022    " TSH 1.70 04/08/2022    TSH 0.97 04/06/2022    TSH 1.01 04/04/2022    TSH 0.26 (L) 04/03/2022    TSH 0.27 (L) 03/31/2022    TSH 0.05 (L) 03/28/2022    TSH 0.03 (L) 03/26/2022    TSH 0.02 (L) 03/21/2022    TSH 0.01 (L) 03/15/2022    TSH 0.01 (L) 03/09/2022    TSH 0.04 (L) 03/01/2022    TSH 0.03 (L) 02/22/2022    TSH 0.05 (L) 02/18/2022    TSH 0.13 (L) 02/12/2022    TSH 0.06 (L) 02/10/2022    TSH 0.10 (L) 02/06/2022    TSH 0.32 (L) 02/02/2022    TSH 0.57 02/01/2022    TSH 14.78 (H) 11/28/2021    TSH 71.37 (H) 10/25/2021    TSH 79.59 (H) 09/20/2021    TSH >120.00 (H) 09/10/2021    TSH 0.15 (L) 07/28/2021    TSH 34.67 (H) 07/14/2021    TSH 31.35 (H) 07/13/2021    TSH 51.46 (H) 07/13/2021    .12 (H) 07/11/2021    TSH 42.94 (H) 07/10/2021    TSH >120.00 (H) 07/09/2021    TSH >120.00 (H) 07/09/2021    TSH 46.56 (H) 06/25/2021    TSH 2.51 05/19/2021    TSH 4.05 (H) 04/20/2021    TSH 13.82 (H) 04/12/2021    .75 (H) 04/08/2021    TSH 22.75 (H) 02/19/2021    TSH 0.30 (L) 10/07/2020    TSH 0.13 (L) 09/28/2020    TSH 0.66 09/18/2020    TSH 13.41 (H) 09/14/2020    .21 (H) 09/09/2020    TSH 32.49 (H) 08/26/2020    TSH 83.22 (H) 06/25/2020    TSH >120.00 (H) 05/18/2020    TSH 0.57 01/12/2020    TSH 0.91 01/10/2020    TSH 1.04 01/10/2020    TSH 4.85 (H) 11/13/2019    .29 (H) 09/30/2019    TSH 94.00 (H) 08/27/2019    .38 (H) 06/26/2019    TSH 65.62 (H) 04/01/2019    TSH 87.09 (H) 02/15/2019    TSH 7.58 (H) 07/16/2018    TSH 2.33 03/26/2018    TSH 10.38 (H) 02/27/2018    TSH 0.53 12/22/2017    FREET4 0.70 (L) 04/01/2024    FREET4 1.11 03/18/2024    FREET4 1.27 03/17/2024    FREET4 1.37 03/07/2024    FREET4 0.97 03/04/2024    FREET4 2.19 (H) 02/16/2024    FREET4 2.47 (H) 02/15/2024    FREET4 1.05 07/18/2023    FREET4 1.08 03/17/2023    FREET4 0.98 12/14/2022    FREET4 1.32 05/19/2022    FREET4 0.90 04/08/2022    FREET4 0.85 04/04/2022    FREET4 0.94 03/31/2022    FREET4 1.08 03/28/2022    FREET4 1.65  (H) 03/21/2022    FREET4 2.09 (H) 03/15/2022    FREET4 2.22 (H) 03/09/2022    FREET4 1.66 (H) 03/01/2022    FREET4 1.44 02/22/2022    FREET4 2.57 (H) 02/18/2022    FREET4 1.42 02/12/2022    FREET4 1.72 (H) 02/10/2022    FREET4 1.66 (H) 02/10/2022    FREET4 2.90 (H) 02/06/2022    FREET4 2.74 (H) 02/02/2022    FREET4 0.87 11/28/2021    FREET4 0.48 (L) 10/25/2021    FREET4 1.56 (H) 09/21/2021    FREET4 <0.20 (A) 09/10/2021    FREET4 1.85 (H) 07/28/2021    FREET4 1.57 (H) 07/14/2021    FREET4 1.28 07/13/2021    FREET4 1.30 07/13/2021    FREET4 0.76 (L) 07/11/2021    FREET4 0.90 07/10/2021    FREET4 1.28 07/09/2021    FREET4 1.22 07/09/2021    FREET4 0.53 (L) 06/25/2021    FREET4 2.23 (H) 05/19/2021    FREET4 1.17 04/20/2021    FREET4 1.87 (H) 04/12/2021    FREET4 <0.20 (A) 04/08/2021    FREET4 0.67 (L) 02/19/2021    FREET4 1.13 10/07/2020    FREET4 2.85 (H) 09/28/2020    FREET4 2.21 (H) 09/18/2020    FREET4 1.36 09/14/2020    FREET4 1.65 (H) 09/10/2020    FREET4 0.46 (L) 09/09/2020    FREET4 0.78 08/26/2020    FREET4 <0.20 (A) 06/25/2020    FREET4 0.21 (L) 05/18/2020    FREET4 2.68 (H) 01/12/2020    FREET4 3.33 (H) 01/10/2020    FREET4 3.74 (H) 01/10/2020    FREET4 0.76 (L) 11/13/2019    FREET4 0.19 (L) 09/30/2019    FREET4 0.33 (L) 08/27/2019    FREET4 0.22 (L) 06/26/2019    FREET4 0.42 (L) 02/15/2019    FREET4 0.75 (L) 07/16/2018    FREET4 0.92 02/27/2018    FREET4 1.18 12/22/2017    W0FINAV 60 03/07/2024    L2MVUYO 69 03/17/2023    V7ARMPV 105 02/22/2022    R2TOTCT 149 02/06/2022    J6MGWCE 105 02/02/2022    N1ZMJRB <10 (A) 09/10/2021    U3NYXHD 72 07/14/2021    B3KVONM 62 07/13/2021    E0CWZDK 60 07/13/2021    D3CXLJO 51 (L) 07/11/2021    C7GFRGY 49 (L) 07/09/2021    C9GDOHJ 43 (L) 06/25/2021    R3TBHGC 117 05/19/2021    D2HUTVE 105 04/20/2021    H4AMVBE 114 04/12/2021    R2VESYL 33 (L) 04/08/2021    T3HTCIQ 76 02/19/2021    F8PVNFN 79 09/10/2020    H2ABQXG 80 08/26/2020    Q2TZNOE 178 01/10/2020    L1IJXML 54 (L)  06/28/2019    TSI <1.0 04/20/2021    THYROIDPAB 43 04/20/2021    THYROGLOBULI <20 12/22/2017         Assessment/Plan   Principal Problem:    Cardiogenic shock (CMS/[facility])  Active Problems:    Heart transplant recipient (CMS/[facility])    ESRD (end stage renal disease) on dialysis (CMS/[facility])    HIRAM (acute kidney injury) (CMS/[facility])    Acute passive congestion of liver    Hyponatremia    Iron deficiency anemia    Abdominal pain    Systolic congestive heart failure (CMS/[facility])    Cardiac transplant rejection (CMS/[facility])    Acute combined systolic (congestive) and diastolic (congestive) heart failure (CMS/[facility])    Ms. Yimi Bowles is a 34F with a PMHx sig for stage D HFrEF (PPCM) s/p ICD s/p CardioMEMs, hypothyroidism 2/2 thyroidectomy, obesity s/p gastric bypass (2017), and SLE who is s/p heart transplant (3/31/2022) with a heart transplant course complicated by RIJ/RUE DVTs, leukopenia, left groin seroma, and asymptomatic 2R ACR (11/2022) treated with steroids, s/p total hysterectomy (2023), Flu A (1/2024).     Originally presented to [facility] ED on 2/15/24 with complaints of N/V x 3 days and inability to keep medications down. Found to have acute systolic heart failure with primary graft failure and cardiogenic shock. Treated for suspected acute cellular vs. acute antibody mediated rejection; however, multiple cardiac biopsies negative for signs of rejection or other causes of graft failure.     Course has been complicated by multiple MCS devices for refractory cardiogenic shock (right femoral IABP: 2/18/24 - 3/1/24, Left femoral VA ECMO: 2/19/24 - 2/29/24, Right axillary impella 5.5: 3/1/24 - remains in place, 2nd right femoral VA ECMO: 3/27/24 - remains in place), ARF requiring RRT, acute liver injury, intubation due to volume overload in setting of pulmonary edema, severe hemolysis 2/2 to impella malposition, mild CMV viremia, bilateral provoked IJ DVTs, multiple episodes of epistaxis & coagulopathies requiring ongoing blood product  transfusions.        Transferred to [facility]ICU on 3/27/24 following right femoral VA ECMO placement due to worsening cardiogenic shock and multi-organ failure despite Impella 5.5 support and multiple inotropes.          Endocrinology was initially consulted to assist with levothyroxine dosing on 3/4 given TSH of 35. Patient home dose levothyroxine is 200 mcg daily.    Recommendations were to increase her dose of levothyroxine to 250 between 3/5 to 3/11 and then she was tapered back to 200 mcg daily of levothyroxine.  Endocrinology later signed off.    Endocrine is now reengaged to assess her levothyroxine dosing given that the patient is being considered for kidney and heart transplant.    TFTs on day of consult 3/4/2024: TSH 35.9, free T4 0.9.    Dose was increased to 250 mcg from 3/5 to 3/11 then tapered back to 200 mcg daily.    Repeat labs on 3/18/2024: TSH 20.74, free T4 1.1    Patient has been on 200 mcg of levothyroxine and TFTs on 4/1/2024: TSH 10.13, free T4 0.7    Although difficult to assess patient's thyroid status from a symptomatology perspective given all the above her reflexes on exam were not delayed.  Although her free T4 is considered low it appears to be adequate.  Her weight-based dose would be less than 150 mcg daily.  Would therefore recommend continuing her current dose of 200 mcg daily and repeat TFT in 1 week to ensure stability.      Mamie Hinojosa MD  Endocrinology, PGY 4  Pager [phone] or secure chat     Case discussed with Dr. Dinh

## 2024-04-02 NOTE — PROGRESS NOTES
Physical Therapy    Physical Therapy Re-Evaluation & Treatment    Patient Name: Charla Bowles  MRN: [id number]  Today's Date: 4/2/2024   Time Calculation  Start Time: 1415  Stop Time: 1444  Time Calculation (min): 29 min    Assessment/Plan   PT Assessment  PT Assessment Results: Decreased strength, Decreased endurance, Impaired balance, Decreased mobility  Rehab Prognosis: Fair  Medical Staff Made Aware: Yes  End of Session Communication: Bedside nurse, PCT/NA/CTA  Assessment Comment: Pt demonstrated ability to complete bed mobility and sat EOB ~10-12 min.  Assistance with all functional mobility this date.  Pt with stable vitals and ECMO numbers throughout session.  Assist with line management during session.  End of Session Patient Position: Bed, 3 rail up, Alarm off, not on at start of session   IP OR SWING BED PT PLAN  Inpatient or Swing Bed: Inpatient  PT Plan  Treatment/Interventions: Bed mobility, Transfer training, Gait training, Balance training, Strengthening, Endurance training, Therapeutic exercise, Therapeutic activity  PT Plan: Skilled PT  PT Frequency: 3 times per week  PT Discharge Recommendations: High intensity level of continued care  PT Recommended Transfer Status: Assist x2  PT - OK to Discharge: Yes      Subjective     General Visit Information:  General  Reason for Referral: Pt transferred to [facility]ICU on 3/27/24 following right femoral VA ECMO placement due to worsening cardiogenic shock and multi-organ failure despite Impella 5.5 support and multiple inotropes. Now working toward duel Heart-Kidney transplantation. (.)  Past Medical History Relevant to Rehab: heart transplant in March 2022 with postoperative course complicated by upper extremity/internal jugular DVTs, and asymptomatic 2R rejection in November 2022  Family/Caregiver Present: No  Co-Treatment: OT  Co-Treatment Reason: To maximize pt safety (.)  Prior to Session Communication: Bedside nurse, PCT/NA/CTA  Patient Position  Received: Bed, 3 rail up, Alarm off, not on at start of session  Preferred Learning Style: auditory, verbal  General Comment: Pt supine, awake, alert at start of session. Assisted pt with bed mobility and EOB sitting. Right axillary impella (P-3), flow 1.8. R femoral VA ECMO 100% FiO2, sweep .5, flow 3.50, CVVH through ECMO. Sites examined pre, during and post mobility. Provider, RN, and perfusionist in room for entirety of session.  Home Living:  Home Living  Type of Home: House  Lives With:  (mother; 4 children)  Home Adaptive Equipment: None  Home Layout: Two level (Pt plans to stay on first floor in living room and sponge bathe)  Home Access: Stairs to enter with rails  Entrance Stairs-Number of Steps: 3  Bathroom Shower/Tub:  (plans to sponge bathe on 1st floor)  Bathroom Equipment: Bedside commode  Prior Level of Function:  Prior Function Per Pt/Caregiver Report  Level of [place]: Independent with ADLs and functional transfers  ADL Assistance: Independent  Homemaking Assistance: Independent  Ambulatory Assistance: Independent  Vocational: On disability  Prior Function Comments: +drives  Precautions:  Precautions  Hearing/Visual Limitations: WFL  Medical Precautions: Cardiac precautions, Fall precautions (.)  Precautions Comment: R axillary impella precautions- no pushing/pulling with R UE, No lifting R UE above shoulder height, no R UE humeral EXT past plane of body, R femoral ECMO precautions, MAP 70-90  Vital Signs:  Vital Signs  Heart Rate: (!) 125 (post: 123)  Heart Rate Source: Monitor  Resp: (!) 40 (Post: 37)  SpO2: 99 % (Post: 100)  BP: 96/86 (Post: 109/81)  MAP (mmHg): 89 (Post: 88)  BP Location: Left arm  BP Method: Arterial line  Patient Position: Lying    Objective   Pain:  Pain Assessment  Pain Assessment:  (reported back pain, did not quantify)  Cognition:  Cognition  Overall Cognitive Status: Within Functional Limits  Arousal/Alertness: Delayed responses to stimuli  Orientation Level:  Oriented X4  Cognition Comments: Mild confusion regarding situation, flat affect    General Assessments:  General Observation  General Observation: R axillary impella, R fem ECMO, CVVH, teley, maxwell     Activity Tolerance  Endurance: Tolerates less than 10 min exercise, no significant change in vital signs  Early Mobility/Exercise Safety Screen: Proceed with mobilization - No exclusion criteria met    Sensation  Light Touch: No apparent deficits  Sensation Comment:  (No change in sensation throughout session)    Strength  Strength Comments: Not formally assessed however BLEs at least 3/5 shown through ROM against gravity       Static Sitting Balance  Static Sitting-Balance Support: Bilateral upper extremity supported, Feet supported  Static Sitting-Level of Assistance:  (CGA-maxAx1)  Static Sitting-Comment/Number of Minutes: ~10-12 min    Functional Assessments:  ADL  ADL's Addressed: No    Bed Mobility  Bed Mobility: Yes (Perfusionist, RN, CNP, ECMO specilist PT, OT present during session.)  Bed Mobility 1  Bed Mobility 1: Supine to sitting  Level of Assistance 1: Maximum assistance (x2)  Bed Mobility Comments 1: HOB elevated to 55 degrees, draw sheet utilized  Bed Mobility 2  Bed Mobility  2: Sitting to supine  Level of Assistance 2: Dependent (x2)  Bed Mobility Comments 2: HOB flat, draw sheet utilized  Bed Mobility 3  Bed Mobility 3:  (Boost towards HOB)  Level of Assistance 3: Dependent (x2)  Bed Mobility Comments 3: HOB flat, draw sheet utilized    Transfers  Transfer: No    Ambulation/Gait Training  Ambulation/Gait Training Performed: No    Extremity/Trunk Assessments:  RLE   RLE : Within Functional Limits  LLE   LLE : Within Functional Limits  Treatments:  Therapeutic Activity  Therapeutic Activity Performed: Yes  Therapeutic Activity 1: Increased time for skilled ICU line/tube management for safe functional mobility  Therapeutic Activity 2: Prior to EOB sitting, adjusted height of bed to 30/45/55  respectively prior to EOB sitting to assess ECMO stability. (Pt sat EOB ~10-12 min with brief bouts of CGA however majority of sitting pt requiring max/dep assist d/t leaning posteriorly on OT.  Mild drop in flow however vital stable throughout.)  Outcome Measures:  [facility] Basic Mobility  Turning from your back to your side while in a flat bed without using bedrails: A lot  Moving from lying on your back to sitting on the side of a flat bed without using bedrails: A lot  Moving to and from bed to chair (including a wheelchair): A lot  Standing up from a chair using your arms (e.g. wheelchair or bedside chair): A lot  To walk in hospital room: A lot  Climbing 3-5 steps with railing: Total  Basic Mobility - Total Score: 11    FSS-ICU  Ambulation: Unable to attempt due to weakness  Rolling: Maximal assistance (performs 25% - 49% of task)  Sitting: Maximal assistance (performs 25% - 49% of task)  Transfer Sit-to-Stand: Maximal assistance (performs 25% - 49% of task)  Transfer Supine-to-Sit: Maximal assistance (performs 25% - 49% of task)  Total Score: 8    Encounter Problems       Encounter Problems (Active)       Balance       Pt will demonstrate ability to complete sitting static/dynamic balance activities with unilateral UE support and no LOB for increase in safety up D/C.  (Progressing)       Start:  04/02/24    Expected End:  04/16/24               Mobility       Pt will demonstrated ability to complete 5 lateral side steps with modAx1, LRAD, proper form and no balance deficits for safe DC. (Progressing)       Start:  04/02/24    Expected End:  04/16/24            Pt will demonstrate ability to complete ther ex activities to improve functional strength for increase in independence upon DC.  (Progressing)       Start:  04/02/24    Expected End:  04/16/24               PT Transfers       Pt will demonstrated ability to complete bed mobility and sit<>stand transfers with mod assistance x2 and use of assistive device to  safely DC. (Progressing)       Start:  04/02/24    Expected End:  04/16/24                   Education Documentation  Precautions, taught by Michelle Lee, PT at 4/2/2024  4:02 PM.  Learner: Patient  Readiness: Acceptance  Method: Explanation  Response: Needs Reinforcement    Body Mechanics, taught by Michelle Lee, PT at 4/2/2024  4:02 PM.  Learner: Patient  Readiness: Acceptance  Method: Explanation  Response: Needs Reinforcement    Mobility Training, taught by Michelle Lee, PT at 4/2/2024  4:02 PM.  Learner: Patient  Readiness: Acceptance  Method: Explanation  Response: Needs Reinforcement    Education Comments  No comments found.

## 2024-04-02 NOTE — PROGRESS NOTES
CTICU Attending Assessment Note    This is a 34yoF who is s/p cardiac transplant with concern for acute rejection of unclear etiology despite maximal therapy in cardiogenic shock requiring VA ECMO and Impella as well as acute renal failure requiring CRRT and acute respiratory failure.    Neuro: Awake and nonfocal exam. CAM negative this am. Continue PO/topical multimodal pain therapy to adequately control pain. Continue CAM assessment and encourage restful sleep per ICU protocols.  Poor sleep overnight and continue melatonin and trazadone and will increase. Continue PT/OT work.  HENT: Epistaxis with packing removed now.  Continue ocean spray and mupuricin.  Appreciate ENT recommendations  CV: Currently on VA ECMO and Impella for support without additional vasoactive medication need. Continue Impella wean to P4 at 1.7L flow. Will continue to wean the Impella today given decreasing LDH. HF multidisciplinary discussion with plans to list for cardiac and renal transplantation. Will plan for vascular surgery recommendations regarding SFA occlusion without signs of malperfusion at this time. VA ECMO at 3165 rpm with 3L flow.  Goal MAP 70-90 and CI >2.2. R Axillary Impella.  R VA ECMO configuration with DRC in place.  Pulm: Acute postoperative pulmonary insufficiency on 1L O2. VA ECMO with 100% and 0.5 sweep.  Renal: Acute renal failure on CRRT.  Appreciate renal transplant team recommendations and will continue to plan for aim net negative fluid balance.   GI: Recent congestive hepatopathy that is improving with hx of colitis. Continued adequate BM and advance diet to goal.  Heme: Acute postoperative blood loss anemia with goal Hgb >7 and platelets >25. No indication for transfusion at this time. Bilateral DVT and SFA occlusion and will continue to uptitrate our low dose heparin today. HCO3 via Impella purge. Hemolysis with downtrending LDH this am and likely attributed to Impella.  Thrombocytopenia likely multifactorial  without concern for HIT.  Endo: Hypothyroidisim and currently on levothyroxine.  Appreciate endocrine recommendations.  Will continue SSI protocol with goal -180 per post-cardiotomy goal but hypoglycemia overnight but not tolerating PO and will continue to follow.  ID: Will stop Keflex.  MRSA screen negative and will stop Vancomycin today.  Will plan to follow-up Cx and consider de-escalation from Zosyn. No other indications for antibiotics at this time. Immunosuppression plan per HF with tacrolimus, prednisone, nystatin, Valcyte.  Will start Myfortic today per HF.   Skin/MSK: L groin wound site and continue to follow wound care recommendations.  Prophy: Continue SCD, PPI.  Maintain R IJ CVC as limited additional access options given current cannulation strategy, wounds, and known clots.   A-F Bundle reviewed and addressed as above with multidisciplinary rounds completed at bedside.  Dispo: Continue CTICU care.    Due to the high probability of life threatening clinical decompensation, the patient required critical care time evaluating and managing this patient.  Critical care time included obtaining a history, examining the patient, ordering and reviewing studies, discussing, developing, and implementing a management plan, evaluating the patient's response to treatment, and discussion with other care team providers. I saw and evaluated the patient myself. I reviewed the provider's documentation and discussed the patient with the provider. Critical care time was performed exclusive of billable procedures.    Critical Care Time: 39 minutes    Feliciano Lee MD  Emergency Critical Care Attending Physician

## 2024-04-02 NOTE — PROGRESS NOTES
The following kidney transplant evaluation and education was done at patient bedside with the patient and her mother Fani. Patient and her mother asked appropriate questions and were attentive during the education session.  Listing consent was also reviewed with the patient and her mother with Dr. Rg (over the phone) and my self.  All questions answered.     PRE-TRANSPLANT EDUCATION  Patient received education regarding the following topics as part of their pre-transplant evaluation:  The evaluation process, including:   Transplant team members and roles    Required consultations and testing   Selection criteria and suitability for transplant   Listing process and receiving an organ offer   Psychosocial and financial considerations for a successful transplant   Patient responsibilities, including the necessity of adhering to a strict medical regimen  An overview of the surgical procedure   Potential medical, surgical, and psychosocial risks to transplantation, including:   Wound infection   Pneumonia   Blood clot formation   Organ rejection, failure, and possibility of re-transplantation   Lifetime immunosuppression therapy and associated risks   Arrhythmias and cardiovascular collapse   Multi-organ system failure   Death   Depression   Post-Traumatic Stress Disorder   Generalized anxiety, issues of dependence, and feelings of guilt  Available alternatives to transplantation  Donor risk factors that could affect the success of the transplant and the health of the patient, including:   Donor age   Donor medical and social history   Condition of the organ   Risk of krishan cancer, HIV, Hepatitis B, Hepatitis C, or malaria if the infection is not detectable at the time of donation  Patient?s right to withdraw consent for transplantation at any time during the process  Transplants not performed in a Medicare-approved transplant center could affect the patient?s ability to have immunosuppression medication  paid for under Medicare part B.   Multiple listing options.    Patient was given the opportunity to have questions answered. Patient was provided a copy of the informed consent for transplant evaluation.    Signed evaluation informed consent received on 4/2/2024      LISTING EDUCATION    Patient educated regarding the following prior to placement on the transplant waiting list:   The patient?s medical condition, prognosis, and treatment plan.   The expectations and patient responsibilities while on the waiting list, including:   Keeping the transplant center informed of any changes in contact information or insurance coverage   Notifying the transplant center of any changes in medical status   Required testing and/or re-evaluation appointments while awaiting transplant   An overview of the surgical procedure, including potential risks and alternatives.   Information regarding what to expect during the inpatient admission and recovery period.   A discussion regarding organ offers and types of potential donors, including potential risks that may be associated with specific types of donors that could affect the success of the transplant or the health of the patient.  The right to refuse transplantation.     Patient was given the opportunity to have questions answered. Patient was provided a copy of the informed consent for transplant listing.    Education provided by:  Transplant Coordinator: Linnette Valderrama RN   Transplant Physician: Dr. Rg     Signed listing informed consent received? YES/NO  Patient agrees to be listed for the following:  KDPI > 85% [X]  Donors After Circulatory Death (DCD) [X]  Donors with a Positive Core Antibody for Hepatitis B [X]  Donors with Hepatitis C Virus to recipients with hepatitis C []  Donors with Hepatitis C Virus to Negative hepatitis C recipients [X]    Patient has been discussed at selection committee.  Patient has been approved for listing and will be placed on the kidney  transplant waiting list.

## 2024-04-02 NOTE — PROGRESS NOTES
[facility]  ADVANCED HEART FAILURE AND TRANSPLANT CARDIOLOGY   Charla Bowles/[id number]    Admit Date: 2/15/2024  Hospital Length of Stay: 47   ICU Length of Stay: 5d 16h   Primary Service: CTICU      Charla Bowles is a 34 y.o. female on day 47 of admission presenting with Cardiogenic shock (CMS/HCC).    Subjective   Patient extubated last evening to NC. Received [id number] plts for thrombocytopenia. Net negative 500mL via CVVH in last 24hrs. Endorses left back pain otherwise significant complaints this morning.     Objective     Physical Exam  Constitutional:       Appearance: She is ill-appearing.   HENT:      Head: Normocephalic.      Comments: Not actively bleeding      Mouth/Throat:      Mouth: Mucous membranes are dry.      Pharynx: Oropharynx is clear. No oropharyngeal exudate or posterior oropharyngeal erythema.   Eyes:      Extraocular Movements: Extraocular movements intact.      Conjunctiva/sclera: Conjunctivae normal.      Pupils: Pupils are equal, round, and reactive to light.   Neck:      Vascular: No JVD.   Cardiovascular:      Rate and Rhythm: Tachycardia present.      Comments: Right axillary impella in place ~45cm at hub (no hematoma), Right femoral VA ECMO in place with reperfusion catheter (site appears clean and dry). Dopplerable radial and ulnar pulses bilaterally. Dopplerable DP/PT pulses bilateral.   Pulmonary:      Effort: Pulmonary effort is normal. No accessory muscle usage, respiratory distress or retractions.      Breath sounds: Normal breath sounds. No wheezing, rhonchi or rales.      Comments: 1L NC.  Abdominal:      General: Abdomen is flat. Bowel sounds are normal. There is no distension.      Palpations: Abdomen is soft. There is no mass.      Hernia: No hernia is present.   Musculoskeletal:         General: Swelling (+2-3 BLE edema) present. No tenderness. Normal range of motion.      Cervical back: Full passive range of motion without pain.  "  Skin:     General: Skin is dry.      Capillary Refill: Capillary refill takes less than 2 seconds.      Coloration: Skin is not jaundiced.      Findings: Bruising and lesion present. No erythema, laceration, rash or wound.   Neurological:      General: No focal deficit present.      Mental Status: She is alert and oriented to person, place, and time.      Comments: Sedated on Precedex. Drowsy but able to communicate and answer yes/no questions   Fcx4   MAEx4    Psychiatric:         Mood and Affect: Mood normal.         Behavior: Behavior normal.         Last Recorded Vitals  Blood pressure 78/74, pulse (!) 121, temperature 35.7 °C (96.3 °F), temperature source Axillary, resp. rate (!) 29, height 1.549 m (5' 0.98\"), weight 90.3 kg (199 lb 1.2 oz), SpO2 100 %.  Intake/Output last 3 Shifts:  I/O last 3 completed shifts:  In: 3709.5 (41.1 mL/kg) [I.V.:1149.5 (12.7 mL/kg); Blood:300; NG/GT:860; IV Piggyback:1400]  Out: 4787 (53 mL/kg) [Other:4787]  Weight: 90.3 kg     Relevant Results  Scheduled medications  acetaminophen, 650 mg, oral, q6h  calcium carbonate-vitamin D3, 1 tablet, oral, Daily  cyanocobalamin, 500 mcg, oral, Daily  darbepoetin floyd, 100 mcg, subcutaneous, q14 days  ferrous sulfate (325 mg ferrous sulfate), 65 mg of iron, oral, Daily with breakfast  folic acid, 1 mg, oral, Daily  insulin lispro, 0-15 Units, subcutaneous, Before meals & nightly  levothyroxine, 200 mcg, oral, Daily  lidocaine, 1 patch, transdermal, Daily  melatonin, 10 mg, oral, Nightly  multivitamin with minerals, 1 tablet, oral, Daily  mycophenolate, 180 mg, oral, BID  nystatin, 5 mL, Swish & Swallow, q6h  oxygen, , inhalation, Continuous - Inhalation  oxymetazoline, 2 spray, Left Nostril, q12h  pantoprazole, 40 mg, intravenous, Daily  piperacillin-tazobactam, 4.5 g, intravenous, q6h  polyethylene glycol, 17 g, oral, BID  predniSONE, 10 mg, oral, Daily  rosuvastatin, 40 mg, oral, Nightly  sennosides-docusate sodium, 2 tablet, oral, " BID  sod phos di, mono-K phos mono, 500 mg, oral, BID  sodium chloride, 1 spray, Each Nostril, 4x daily  [Held by provider] sulfamethoxazole-trimethoprim, 80 mg of trimethoprim, oral, Daily  tacrolimus, 3 mg, oral, q24h  tacrolimus, 3 mg, oral, q24h  traZODone, 25 mg, oral, Nightly  valGANciclovir, 450 mg, oral, q48h      Continuous medications  heparin, 200 Units/hr, Last Rate: 200 Units/hr (04/02/24 0930)  lactated Ringer's, 5 mL/hr, Last Rate: 5 mL/hr (04/02/24 0700)  PrismaSol 4/2.5, 25 mL/kg/hr  sodium bicarbonate infusion Impella Purge 25 mEq/1000 mL D5W, 10 mL/hr, Last Rate: 10 mL/hr (04/02/24 0700)      PRN medications  PRN medications: bisacodyl, calcium gluconate, calcium gluconate, dextrose, dextrose **OR** glucagon, HYDROmorphone, artificial tears, magnesium sulfate, magnesium sulfate, microfibrllar collagen, mupirocin, naloxone, ondansetron, oxyCODONE, sodium chloride     Results for orders placed or performed during the hospital encounter of 02/15/24 (from the past 24 hour(s))   Blood Gas Arterial Full Panel   Result Value Ref Range    POCT pH, Arterial 7.44 (H) 7.38 - 7.42 pH    POCT pCO2, Arterial 38 38 - 42 mm Hg    POCT pO2, Arterial 168 (H) 85 - 95 mm Hg    POCT SO2, Arterial 100 94 - 100 %    POCT Oxy Hemoglobin, Arterial 96.5 94.0 - 98.0 %    POCT Hematocrit Calculated, Arterial 23.0 (L) 36.0 - 46.0 %    POCT Sodium, Arterial 134 (L) 136 - 145 mmol/L    POCT Potassium, Arterial 4.7 3.5 - 5.3 mmol/L    POCT Chloride, Arterial 105 98 - 107 mmol/L    POCT Ionized Calcium, Arterial 1.07 (L) 1.10 - 1.33 mmol/L    POCT Glucose, Arterial 191 (H) 74 - 99 mg/dL    POCT Lactate, Arterial 0.6 0.4 - 2.0 mmol/L    POCT Base Excess, Arterial 1.6 -2.0 - 3.0 mmol/L    POCT HCO3 Calculated, Arterial 25.8 22.0 - 26.0 mmol/L    POCT Hemoglobin, Arterial 7.8 (L) 12.0 - 16.0 g/dL    POCT Anion Gap, Arterial 8 (L) 10 - 25 mmo/L    Patient Temperature 37.0 degrees Celsius    FiO2 30 %   Calcium, Ionized   Result Value  Ref Range    POCT Calcium, Ionized 1.03 (L) 1.1 - 1.33 mmol/L   Magnesium   Result Value Ref Range    Magnesium 2.29 1.60 - 2.40 mg/dL   Renal Function Panel   Result Value Ref Range    Glucose 186 (H) 74 - 99 mg/dL    Sodium 137 136 - 145 mmol/L    Potassium 4.6 3.5 - 5.3 mmol/L    Chloride 102 98 - 107 mmol/L    Bicarbonate 25 21 - 32 mmol/L    Anion Gap 15 10 - 20 mmol/L    Urea Nitrogen 13 6 - 23 mg/dL    Creatinine 0.85 0.50 - 1.05 mg/dL    eGFR >90 >60 mL/min/1.73m*2    Calcium 8.3 (L) 8.6 - 10.6 mg/dL    Phosphorus 2.5 2.5 - 4.9 mg/dL    Albumin 4.5 3.4 - 5.0 g/dL   POCT GLUCOSE   Result Value Ref Range    POCT Glucose 175 (H) 74 - 99 mg/dL   Heparin Assay, UFH   Result Value Ref Range    Heparin Unfractionated <0.1 See Comment Below for Therapeutic Ranges IU/mL   Respiratory Culture/Smear    Specimen: SPUTUM; Fluid   Result Value Ref Range    Respiratory Culture/Smear No growth to date     Gram Stain       Gram stain indicates specimen consists of lower respiratory tract secretions.    Gram Stain No predominant organism    MRSA Surveillance for Vancomycin De-escalation, PCR    Specimen: Anterior Nares; Swab   Result Value Ref Range    MRSA PCR Not Detected Not Detected   Blood Gas Arterial Full Panel   Result Value Ref Range    POCT pH, Arterial 7.46 (H) 7.38 - 7.42 pH    POCT pCO2, Arterial 37 (L) 38 - 42 mm Hg    POCT pO2, Arterial 290 (H) 85 - 95 mm Hg    POCT SO2, Arterial 100 94 - 100 %    POCT Oxy Hemoglobin, Arterial 96.1 94.0 - 98.0 %    POCT Hematocrit Calculated, Arterial 23.0 (L) 36.0 - 46.0 %    POCT Sodium, Arterial 135 (L) 136 - 145 mmol/L    POCT Potassium, Arterial 4.8 3.5 - 5.3 mmol/L    POCT Chloride, Arterial 106 98 - 107 mmol/L    POCT Ionized Calcium, Arterial 1.05 (L) 1.10 - 1.33 mmol/L    POCT Glucose, Arterial 150 (H) 74 - 99 mg/dL    POCT Lactate, Arterial 0.7 0.4 - 2.0 mmol/L    POCT Base Excess, Arterial 2.3 -2.0 - 3.0 mmol/L    POCT HCO3 Calculated, Arterial 26.3 (H) 22.0 - 26.0  mmol/L    POCT Hemoglobin, Arterial 7.6 (L) 12.0 - 16.0 g/dL    POCT Anion Gap, Arterial 8 (L) 10 - 25 mmo/L    Patient Temperature 37.0 degrees Celsius    FiO2 30 %   CBC   Result Value Ref Range    WBC 5.2 4.4 - 11.3 x10*3/uL    nRBC 2.7 (H) 0.0 - 0.0 /100 WBCs    RBC 2.64 (L) 4.00 - 5.20 x10*6/uL    Hemoglobin 7.3 (L) 12.0 - 16.0 g/dL    Hematocrit 23.7 (L) 36.0 - 46.0 %    MCV 90 80 - 100 fL    MCH 27.7 26.0 - 34.0 pg    MCHC 30.8 (L) 32.0 - 36.0 g/dL    RDW 23.7 (H) 11.5 - 14.5 %    Platelets 37 (LL) 150 - 450 x10*3/uL   Blood Culture    Specimen: Peripheral Venipuncture; Blood culture   Result Value Ref Range    Blood Culture Loaded on Instrument - Culture in progress    Blood Culture    Specimen: Peripheral Venipuncture; Blood culture   Result Value Ref Range    Blood Culture Loaded on Instrument - Culture in progress    POCT GLUCOSE   Result Value Ref Range    POCT Glucose 159 (H) 74 - 99 mg/dL   Reticulocytes   Result Value Ref Range    Retic % 4.2 (H) 0.5 - 2.0 %    Retic Absolute 0.104 (H) 0.018 - 0.083 x10*6/uL    Reticulocyte Hemoglobin 32 28 - 38 pg    Immature Retic fraction 37.2 (H) <=16.0 %   Haptoglobin   Result Value Ref Range    Haptoglobin <10 (L) 37 - 246 mg/dL   CBC and Auto Differential   Result Value Ref Range    WBC 4.9 4.4 - 11.3 x10*3/uL    nRBC 2.6 (H) 0.0 - 0.0 /100 WBCs    RBC 2.47 (L) 4.00 - 5.20 x10*6/uL    Hemoglobin 7.0 (L) 12.0 - 16.0 g/dL    Hematocrit 22.4 (L) 36.0 - 46.0 %    MCV 91 80 - 100 fL    MCH 28.3 26.0 - 34.0 pg    MCHC 31.3 (L) 32.0 - 36.0 g/dL    RDW 23.4 (H) 11.5 - 14.5 %    Platelets 33 (LL) 150 - 450 x10*3/uL    Immature Granulocytes %, Automated 6.3 (H) 0.0 - 0.9 %    Immature Granulocytes Absolute, Automated 0.31 0.00 - 0.70 x10*3/uL   Lactate Dehydrogenase   Result Value Ref Range    LDH 1,397 (H) 84 - 246 U/L   Calcium, Ionized   Result Value Ref Range    POCT Calcium, Ionized 1.01 (L) 1.1 - 1.33 mmol/L   Coagulation Screen   Result Value Ref Range    Protime  13.9 (H) 9.8 - 12.8 seconds    INR 1.2 (H) 0.9 - 1.1    aPTT 35 27 - 38 seconds   Heparin Assay, UFH   Result Value Ref Range    Heparin Unfractionated <0.1 See Comment Below for Therapeutic Ranges IU/mL   Hepatic function panel   Result Value Ref Range    Albumin 4.5 3.4 - 5.0 g/dL    Bilirubin, Total 2.2 (H) 0.0 - 1.2 mg/dL    Bilirubin, Direct 1.1 (H) 0.0 - 0.3 mg/dL    Alkaline Phosphatase 162 (H) 33 - 110 U/L    ALT 19 7 - 45 U/L     (H) 9 - 39 U/L    Total Protein 6.2 (L) 6.4 - 8.2 g/dL   Magnesium   Result Value Ref Range    Magnesium 2.29 1.60 - 2.40 mg/dL   Basic Metabolic Panel   Result Value Ref Range    Glucose 63 (L) 74 - 99 mg/dL    Sodium 136 136 - 145 mmol/L    Potassium 4.3 3.5 - 5.3 mmol/L    Chloride 101 98 - 107 mmol/L    Bicarbonate 27 21 - 32 mmol/L    Anion Gap 12 10 - 20 mmol/L    Urea Nitrogen 11 6 - 23 mg/dL    Creatinine 0.82 0.50 - 1.05 mg/dL    eGFR >90 >60 mL/min/1.73m*2    Calcium 8.4 (L) 8.6 - 10.6 mg/dL   Phosphorus   Result Value Ref Range    Phosphorus 1.7 (L) 2.5 - 4.9 mg/dL   Blood Gas Arterial Full Panel   Result Value Ref Range    POCT pH, Arterial 7.43 (H) 7.38 - 7.42 pH    POCT pCO2, Arterial 38 38 - 42 mm Hg    POCT pO2, Arterial 266 (H) 85 - 95 mm Hg    POCT SO2, Arterial 100 94 - 100 %    POCT Oxy Hemoglobin, Arterial 96.6 94.0 - 98.0 %    POCT Hematocrit Calculated, Arterial 28.0 (L) 36.0 - 46.0 %    POCT Sodium, Arterial 133 (L) 136 - 145 mmol/L    POCT Potassium, Arterial 4.0 3.5 - 5.3 mmol/L    POCT Chloride, Arterial 104 98 - 107 mmol/L    POCT Ionized Calcium, Arterial 1.02 (L) 1.10 - 1.33 mmol/L    POCT Glucose, Arterial 56 (L) 74 - 99 mg/dL    POCT Lactate, Arterial 0.8 0.4 - 2.0 mmol/L    POCT Base Excess, Arterial 0.9 -2.0 - 3.0 mmol/L    POCT HCO3 Calculated, Arterial 25.2 22.0 - 26.0 mmol/L    POCT Hemoglobin, Arterial 9.2 (L) 12.0 - 16.0 g/dL    POCT Anion Gap, Arterial 8 (L) 10 - 25 mmo/L    Patient Temperature 37.0 degrees Celsius    FiO2 40 %    Manual Differential   Result Value Ref Range    Neutrophils %, Manual 80.5 40.0 - 80.0 %    Lymphocytes %, Manual 6.8 13.0 - 44.0 %    Monocytes %, Manual 8.5 2.0 - 10.0 %    Eosinophils %, Manual 0.8 0.0 - 6.0 %    Basophils %, Manual 0.0 0.0 - 2.0 %    Myelocytes %, Manual 3.4 0.0 - 0.0 %    Seg Neutrophils Absolute, Manual 3.94 1.20 - 7.00 x10*3/uL    Lymphocytes Absolute, Manual 0.33 (L) 1.20 - 4.80 x10*3/uL    Monocytes Absolute, Manual 0.42 0.10 - 1.00 x10*3/uL    Eosinophils Absolute, Manual 0.04 0.00 - 0.70 x10*3/uL    Basophils Absolute, Manual 0.00 0.00 - 0.10 x10*3/uL    Myelocytes Absolute, Manual 0.17 0.00 - 0.00 x10*3/uL    Total Cells Counted 118     RBC Morphology See Below     Polychromasia Mild     Hypochromia Mild     RBC Fragments Few     Target Cells Few     Ovalocytes Few    POCT GLUCOSE   Result Value Ref Range    POCT Glucose 62 (L) 74 - 99 mg/dL   POCT GLUCOSE   Result Value Ref Range    POCT Glucose 71 (L) 74 - 99 mg/dL   ECMO Arterial Blood Gas Unsolicited   Result Value Ref Range    POCT pH, Arterial ECMO 7.27 Reference range not established pH    POCT pCO2, Arterial ECMO 61 Reference range not established mm Hg    POCT pO2,  Arterial ECMO 397 Reference range not established mm Hg    POCT SO2, Arterial ECMO 100 Reference range not established %    POCT Oxy Hemoglobin, Arterial ECMO 96.8 Reference range not established %    POCT Base Excess, Arterial ECMO -0.3 Reference range not established mmol/L    POCT HCO3 Calculated, Arterial ECMO 28.0 Reference range not established mmol/L    Patient Temperature 37.0 degrees Celsius   ECMO Venous Full Panel Unsolicited   Result Value Ref Range    POCT pH, Venous ECMO 7.29 Reference range not established pH    POCT pCO2, Venous ECMO 58 Reference range not established mm Hg    POCT pO2,  Venous  ECMO 48 Reference range not established mm Hg    POCT SO2, Venous ECMO 85 Reference range not established %    POCT Oxy Hemoglobin, Venous ECMO 82.0  Reference range not established %    POCT Hematocrit Calculated, Venous ECMO 23.0 (L) 36.0 - 46.0 %    POCT Sodium, Venous ECMO 134 (L) 136 - 145 mmol/L    POCT Potassium, Venous ECMO 4.5 3.5 - 5.3 mmol/L    POCT Chloride, Venous ECMO 103 98 - 107 mmol/L    POCT Ionized Calcium, Venous ECMO 1.11 1.10 - 1.33 mmol/L    POCT Glucose, Venous ECMO 90 74 - 99 mg/dL    POCT Lactate Venous ECMO 0.6 0.4 - 2.0 mmol/L    POCT Base Excess, Venous ECMO 1.0 Reference range not established mmol/L    POCT HCO3 Calculated, Venous ECMO 27.9 Reference range not established mmol/L    POCT Hemoglobin, Venous ECMO 7.5 (L) 12.0 - 16.0 g/dL    POCT Anion Gap, Venous ECMO 8 Reference range not established mmo/L    Patient Temperature 37.0 degrees Celsius   POCT GLUCOSE   Result Value Ref Range    POCT Glucose 109 (H) 74 - 99 mg/dL   Tacrolimus level   Result Value Ref Range    Tacrolimus  3.9 <=15.0 ng/mL   POCT GLUCOSE   Result Value Ref Range    POCT Glucose 125 (H) 74 - 99 mg/dL     *Note: Due to a large number of results and/or encounters for the requested time period, some results have not been displayed. A complete set of results can be found in Results Review.       XR chest 1 view    Result Date: 3/28/2024  Interpreted By:  Sandra Griffith and Stephens Katherine STUDY: XR CHEST 1 VIEW;  3/27/2024 8:38 pm   INDICATION: Signs/Symptoms:Post op cardiac surgery.   COMPARISON: Chest radiograph 03/27/2024   ACCESSION NUMBER(S): [id number]   ORDERING CLINICIAN: YESENIA KHAN   FINDINGS: AP radiograph of the chest was provided.   Endotracheal tube noted with tip projecting approximately 2.0 cm in the right mainstem bronchus. Enteric tube seen coursing below the level diaphragm with tip out of the field of view. Right subclavian Impella device overlying the cardiomediastinal silhouette. Right IJ central venous catheter with tip overlying the mid SVC. ECMO cannula with tip overlying the expected location of the right atrium. Surgical clips  overlie the cardiomediastinal silhouette and right axilla. Cardiac device projects over the cardiomediastinal silhouette. Postsurgical changes from median sternotomy.   CARDIOMEDIASTINAL SILHOUETTE: The cardiomediastinal silhouette is obscured. The mediastinum is shifted to the left likely secondary atelectasis.   LUNGS: Hazy opacification of the left hemithorax. Right basilar atelectasis. No pneumothorax.   ABDOMEN: No remarkable upper abdominal findings.   BONES: No acute osseous changes.       1.  Endotracheal tube with tip in the right mainstem bronchus, retraction of at least 2 cm is recommended. 2. Hazy opacification of the left hemithorax with leftward mediastinal shift, likely secondary atelectasis. 3. Postsurgical changes and medical devices as described above.   I personally reviewed the images/study and I agree with Charity Ross DO's (radiology resident) findings as stated. This study was interpreted at [facility], [place], [place].   MACRO: Charity Ross discussed the significance and urgency of this critical finding by telephone with  YESENIA KHAN on 3/27/2024 at 9:37 pm.  (**-RCF-**) Findings:  See findings.     Signed by: Sandra Griffith 3/28/2024 9:19 AM Dictation workstation:   [id number]    Vascular US Lower Extremity Arterial Duplex Bilateral    Result Date: 3/28/2024  Preliminary Cardiology Report           [place] [address]   Tel [phone] and Fax [phone]                 Preliminary Vascular Lab Report  [facility] BILATERAL W/ KATHIE  Patient Name:     MIGUEL DIMAS      Reading           [id number] [address]                Physician:        MD Study Date:       3/27/2024            Ordering          [id number] FIONA WHYTE                                        Physician: MRN/PID:          [id number]             Technologist:     Evangelina  Leo [facility] Accession#:       [id number]         Technologist 2:   Joy Lewis T Date of           1990             Encounter#:       [id number] Birth/Age: Gender:           F Admission Status: Inpatient            Location          [facility]                                        Performed:  Diagnosis/ICD: Acute Kidney Failure-N17.9; Encounter for other preprocedural                examination-Z01.818 Indication:    pre ECMO placement Procedure/CPT: 23544 Peripheral artery Lower arterial Duplex BPG complete  **Report Amended** Report Amended By: Evangelina Bailey DILMA     Date and Time: 3/28/2024 at 3/28/2024  **CRITICAL RESULT** Critical Result: Left SFA prox occlusion with reconstitution via collateralized flow noted distally. Notification called to Pretty Toussaint MD on 3/27/2024 at 3:20:25 PM by Evangelina Bailey RVT.  PRELIMINARY CONCLUSIONS: Right Lower Arterial: Evaluated right EIA distal, CFA, SFA prox, and Profunda appear patent. Left Lower Arterial: There appears to be a short segment occlusion noted in the proximal SFA just distal to the CFA bifurcation. There is collateralized flow noted distally. There are elevated velocities noted in the left CFA, may be overestimated due to shadowing from previous procedures. Right Lower Venous: The evaluated right EIV distal, CFV, FV prox, and DFV appear patent with no evidence of acute deep vein thrombus. Left Lower Venous: Cannot rule out thrombus in non-compressible iliac and common femoral vein veins. Patient unable to tolerate compressions. Color flow and Doppler noted. No ultrasonic evidence of acute deep vein thrombus in the evaluated left EIV distal, CFV, FV prox, and DFV.  Additional Findings: Irregular heart rhythm noted due to cardiac device. Left groin was technically difficult due to previous procedures and scarring.  Imaging & Doppler Findings:  Right                 Compressible Thrombus   Flow Distal External Iliac     Yes         None   Pulsatile CFV                       Yes        None   Pulsatile PFV                       Yes        None   Pulsatile FV Proximal               Yes        None   Pulsatile  Left                  Compress Thrombus   Flow Distal External Iliac                   Pulsatile CFV                                     Pulsatile PFV                                     Pulsatile FV Proximal             Yes      None   Pulsatile  Right                     Left   PSV                       PSV 56 cm/s        EIA        30 cm/s 29 cm/s        CFA        72 cm/s 36 cm/s Profunda Proximal 70 cm/s 40 cm/s   SFA Proximal    28 cm/s  VASCULAR PRELIMINARY REPORT completed by Evangelina Bailey RVT on 3/28/2024 at 7:32:14 AM  ** Final (Updated) **     XR abdomen 1 view    Result Date: 3/27/2024  Interpreted By:  Sandra Griffith and Sheng Max STUDY: XR ABDOMEN 1 VIEW; XR CHEST 1 VIEW;  3/26/2024 7:12 pm; 3/27/2024 7:44 am   INDICATION: Signs/Symptoms:S/p OHT r/o ileas; Signs/Symptoms:Impella.   COMPARISON: Chest radiographs dated 03/26/2024, 03/25/2024, 03/22/2024 and CT chest abdomen pelvis dated 03/17/2024   ACCESSION NUMBER(S): [id number]; [id number]   ORDERING CLINICIAN: FILOMENA KEANE   FINDINGS: AP radiographs of the chest and abdomen were provided:   LINES AND DEVICES: Right subclavian approach Impella device projects over the left ventricle. Right internal jugular approach central venous catheter tip projects over the lower SVC. CardioMEMS device projects over the cardiomediastinal silhouette. Numerous cardiac pacing wires are present. Surgical clips project over the cardiomediastinal silhouette and right axilla. Numerous intact median sternotomy wires.   CARDIOMEDIASTINAL SILHOUETTE: Stable enlargement of the cardiomediastinal silhouette is stable in size and configuration.   LUNGS: There is no pulmonary edema. Linear subsegmental bibasilar atelectasis. Trace bilateral pleural effusions. No focal  consolidation or pneumothorax is seen.   ABDOMEN: Paucity of gas in the imaged bowel loops with nonobstructive bowel gas pattern. Limited evaluation of pneumoperitoneum on supine imaging, however no gross evidence of free air is noted.   BONES: No acute osseous abnormality.       1. Stable enlargement of the cardiomediastinal silhouette with trace bilateral pleural effusions. 2. Paucity of gas in the imaged bowel loops with nonobstructive bowel gas pattern.     I personally reviewed the images/study and I agree with the findings as stated by Dr. Tee Joe. This study was interpreted at [place], [place].   MACRO: none   Signed by: Sandra Griffith 3/27/2024 2:51 PM Dictation workstation:   [id number]    XR chest 1 view    Result Date: 3/27/2024  Interpreted By:  Sandra Griffith and Sheng Max STUDY: XR ABDOMEN 1 VIEW; XR CHEST 1 VIEW;  3/26/2024 7:12 pm; 3/27/2024 7:44 am   INDICATION: Signs/Symptoms:S/p OHT r/o ileas; Signs/Symptoms:Impella.   COMPARISON: Chest radiographs dated 03/26/2024, 03/25/2024, 03/22/2024 and CT chest abdomen pelvis dated 03/17/2024   ACCESSION NUMBER(S): [id number]; [id number]   ORDERING CLINICIAN: FILOMENA KEANE   FINDINGS: AP radiographs of the chest and abdomen were provided:   LINES AND DEVICES: Right subclavian approach Impella device projects over the left ventricle. Right internal jugular approach central venous catheter tip projects over the lower SVC. CardioMEMS device projects over the cardiomediastinal silhouette. Numerous cardiac pacing wires are present. Surgical clips project over the cardiomediastinal silhouette and right axilla. Numerous intact median sternotomy wires.   CARDIOMEDIASTINAL SILHOUETTE: Stable enlargement of the cardiomediastinal silhouette is stable in size and configuration.   LUNGS: There is no pulmonary edema. Linear subsegmental bibasilar atelectasis. Trace bilateral pleural effusions. No  focal consolidation or pneumothorax is seen.   ABDOMEN: Paucity of gas in the imaged bowel loops with nonobstructive bowel gas pattern. Limited evaluation of pneumoperitoneum on supine imaging, however no gross evidence of free air is noted.   BONES: No acute osseous abnormality.       1. Stable enlargement of the cardiomediastinal silhouette with trace bilateral pleural effusions. 2. Paucity of gas in the imaged bowel loops with nonobstructive bowel gas pattern.     I personally reviewed the images/study and I agree with the findings as stated by Dr. Tee Joe. This study was interpreted at [place], [place].   MACRO: none   Signed by: Sandra Griffith 3/27/2024 2:51 PM Dictation workstation:   [id number]    Transthoracic Echo (TTE) Complete    Result Date: 3/27/2024   [facility] (formerly [facility])[3], [address] [place] [address]                Tel [phone] and Fax [phone] TRANSTHORACIC ECHOCARDIOGRAM REPORT  Patient Name:      MIGUEL DIMAS      Reading Physician:    [id number] Patrica BASS MD Study Date:        3/27/2024            Ordering Provider:    [id number] KONSTANTIN PERKINS MRN/PID:           [id number]             Fellow: Accession#:        [id number]         Nurse: Date of Birth/Age: 1990 / 34 years  Sonographer:          MAURO Colbert RDCS Gender:            F                    Additional Staff: Height:            152.40 cm            Admit Date:           2/15/2024 Weight:            95.71 kg             Admission Status:     Inpatient - STAT BSA / BMI:         1.91 m2 / 41.21      Encounter#:           [id number]                    kg/m2                                         Department Location:  [address] Blood Pressure: 93 /57 mmHg Study Type:     TRANSTHORACIC ECHO (TTE) COMPLETE Diagnosis/ICD: 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 Definity used as a contrast agent for endocardial border               definition. Total contrast used for this procedure was 3 mL via IV               push. The patient was awake.  PHYSICIAN INTERPRETATION: Left Ventricle: The left ventricular systolic function is severely decreased, with an estimated ejection fraction of 25%. There is global hypokinesis of the left ventricle with minor regional variations. The left ventricular cavity size is normal. Left ventricular diastolic filling was indeterminate. There is no definite left ventricular thrombus visualized. Echocontrast was used and excluded LV apical thrombus. Left Atrium: The left atrium is consistent with transplant. Right Ventricle: The right ventricle is mildly enlarged. There is reduced right ventricular systolic function. Right Atrium: The right atrium is mildly dilated. Aortic Valve: The aortic valve was not well visualized. There is indeterminate aortic valve regurgitation. Mitral Valve: The mitral valve is mildly thickened. There is moderate to severe mitral valve regurgitation which is centrally directed. Tricuspid Valve: The tricuspid valve is structurally normal. There is severe tricuspid regurgitation. The Doppler estimated RVSP is within normal limits at 28.8 mmHg. RSVP likely underestimated due to the severity of TR. Pulmonic Valve: The pulmonic valve is not well visualized. Pulmonic valve regurgitation was not assessed. Pericardium: There is a trivial pericardial effusion. Pleural: There is left pleural effusion. Aorta: The aortic root is normal. In comparison to the previous echocardiogram(s): Compared with the prior exam from 3/25/2024, there is still severe global LV systolic dysfunction while on full Impella suppoert.  There was already moderate to severe MR which persists. TR was not assessed on the prior study but is severe TR today. Although the RVSP is estimated to be normal based on TR Vmax, that is likely underestimated due to the severity of TR. There was previously severely reduced RV function previously and appears imilar today.  LEFT VENTRICULAR ASSIST DEVICE: LVAD: The patient has a(n) Impella LVAD device present. LVAD Comments: IMpella extends approx 3.8-4.0 cm beneath the AV and is angles posteriorly.  CONCLUSIONS:  1. Left ventricular systolic function is severely decreased with a 25% estimated ejection fraction.  2. Echocontrast was used and excluded LV apical thrombus.  3. No left ventricular thrombus visualized.  4. There is reduced right ventricular systolic function.  5. Moderate to severe mitral valve regurgitation.  6. RVSP within normal limits.  7. Severe tricuspid regurgitation visualized.  8. Compared with the prior exam from 3/25/2024, there is still severe global LV systolic dysfunction while on full Impella suppoert. There was already moderate to severe MR which persists. TR was not assessed on the prior study but is severe TR today. Although the RVSP is estimated to be normal based on TR Vmax, that is likely underestimated due to the severity of TR. There was previously severely reduced RV function previously and appears imilar today.  9. There is global hypokinesis of the left ventricle with minor regional variations. QUANTITATIVE DATA SUMMARY: 2D MEASUREMENTS:                          Normal Ranges: IVSd:          0.90 cm   (0.6-1.1cm) LVPWd:         0.90 cm   (0.6-1.1cm) LVIDd:         4.20 cm   (3.9-5.9cm) LVIDs:         3.30 cm LV Mass Index: 62.1 g/m2 LV % FS        21.4 % AORTA MEASUREMENTS:                      Normal Ranges: Ao Sinus, d: 2.00 cm (2.1-3.5cm) LV SYSTOLIC FUNCTION BY 2D PLANIMETRY (MOD):                     Normal Ranges: EF-A4C View: 25.6 % (>=55%) EF-A2C View: 26.2 % EF-Biplane:   35.2 %  RIGHT VENTRICLE: RV Basal 3.50 cm RV Mid   2.40 cm RV Major 7.0 cm TAPSE:   6.5 mm TRICUSPID VALVE/RVSP:                             Normal Ranges: Peak TR Velocity: 2.54 m/s RV Syst Pressure: 28.8 mmHg (< 30mmHg)  [id number] Patrica Aguilera MD Electronically signed on 3/27/2024 at 1:20:02 PM  ** Final **         This patient has a urinary catheter   Reason for the urinary catheter remaining today? Urine catheter unnecessary, will be removed today    This patient is intubated   Reason for patient to remain intubated today? they are planned for extubation trial later today/tonight       Malnutrition Diagnosis Status: Ongoing  Malnutrition Diagnosis: Moderate malnutrition related to acute disease or injury  As Evidenced by: pt's reported intake likely meeting <75% of estimated needs for at least 7 days, previous 5% weight loss in 1 month, LOS 42.  I agree with the dietitian's malnutrition diagnosis.      Assessment/Plan   Ms. Yimi Bowles is a 34F with a PMHx sig for stage D HFrEF (PPCM) s/p ICD s/p CardioMEMs, hypothyroidism 2/2 thyroidectomy, obesity s/p gastric bypass (2017), and SLE who is s/p OHT (3/31/2022) with a post-OHT course complicated by RIJ/RUE DVTs, leukopenia, left groin seroma, and asymptomatic 2R ACR (11/2022) treated with steroids, s/p total hysterectomy (2023), Flu A (1/2024).     Originally presented to [facility] ED on 2/15/24 with complaints of N/V x 3 days and inability to keep medications down. Found to have acute systolic heart failure with primary graft failure and cardiogenic shock. Treated for suspected acute cellular vs. acute antibody mediated rejection; however, multiple cardiac biopsies negative for signs of rejection or other causes of graft failure. Course has been complicated by multiple MCS devices for refractory cardiogenic shock (right femoral IABP: 2/18/24 - 3/1/24, Left femoral VA ECMO: 2/19/24 - 2/29/24, Right axillary impella 5.5: 3/1/24 - remains in place, 2nd right femoral VA  ECMO: 3/27/24 - remains in place), ARF requiring RRT, acute liver injury, intubation due to volume overload in setting of pulmonary edema, severe hemolysis 2/2 to impella malposition, mild CMV viremia, bilateral provoked IJ DVTs, multiple episodes of epistaxis & coagulopathies requiring ongoing blood product transfusions.       Transferred to CTICU on 3/27/24 following right femoral VA ECMO placement due to worsening cardiogenic shock and multi-organ failure despite Impella 5.5 support and multiple inotropes. Now working toward duel Heart-Kidney transplantation.       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  #Acute renal failure requiring RRT   #Acute transaminitis  #Epistaxis   #Acute coagulopathy/pancytopenia    #Low grade CMV Viremia  #Provoked bilateral IJ DVTs       Donor/Recipient Infectious history:  CMV: -/+ (low grade CMV viremia w/ levels < 35 on 3/1/24, repeat CMV levels remain negative since 3/27/24)  Toxo: -/-   Hep C: -/-     Rejection/Prophylaxis (transplants):  - Steroids: Continue 10mg PO prednisone daily  - Tacrolimus: 3mg PO @ 0630 and 3mg PO @ 1830 w/ daily levels drawn @ 0600  - Tacrolimus goal troughs: 8-10  - Myfortic acid: 360mg BID    - Antifungals: nystatin 500,000units Q6  - Antivirals: valcyte 450mg Q48hrs   - Anti PCP & Toxoplasmosis: holding Bactrim SS daily due to thrombocytopenia     Last cardiac biopsy: 2/16/24 with ACR grade 1R and no AMR (extremely low C4d+ detected), 2/20/24 negative for ACR and AMR  Last HLA: 3/2/24 negative for DSAs; Repeat levels pending from 4/2/24  Last RHC: closing numbers on 3/16/24 /86(97) RA 20, PAP 40/33(37), C.O./C.I. 5.5/2.8, PVR 88, SVR 1115   Last LHC: 2/18/24 negative for CAV and CAD   Last TTE/BOB: 3/27/24 shows LVEF 10-15% w/ global hypokinesis, severe RV dysfunction, mild-mod MR, trace TR and impella angled posteriorly   Osteopenia/osteoporosis  prophylaxis: Vitamin D3 currently held. Continue calcium supplements  Peptic/gastric ulcer prophylaxis: Pantoprazole 40mg daily  CAV Prophylaxis: Holding Aspirin 81mg due to continued thrombocytopenia. Continue pravastatin daily.      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 Broad differential including SLE flair, giant cell vasculitis, CAV/CAD, viral cardiomyopathy, sarcoidosis, amyloidosis and allograft rejection amongst many others. Most likely smoldering ACR vs. AMR; however, 2xallograft biopsies on 2/16 & 2/20 remain negative for any significant pathology. Notably, both biopsies taken after rejection therapies implemented which may have reduced areas of graft damage.    - DSAs remain negative; however, patient may have non-HLA antibodies present. 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   - Negative CAV & CAD via LHC on 2/18/24   - Completed methylpred steroid pulse w/ 1g Q24 x 3 days (2/16-2/18) and prednisone taper   - Thymoglobulin doses: 2/18 & 2/19   - IVIG + PLEX Session: 2/18, 2/20, 3/7, 3/11 and 3/13    - Right femoral VA ECMO flowing 3.5L w/ FIO2 100% and sweep 0.5  - Right axillary impella for LV venting remains in place and set P4 w/ flows of 1.9  - Extubated 4/1/24 to NC   - LFTs improving s/p ECMO cannulation with improving hemolytic labwork s/p impella wean    - Remains in acute renal failure. Has met criteria for kidney transplant listing s/p RRT x 6 weeks as of 3/29/24. Abdominal Transplant team will formally approve for kidney transplantation pending approval for heart transplantation.    - Heart Transplant committee  formally approved for heart transplantation as status 1 on 4/2/24.   - Concerns remain with deconditioning, multiorgan failure, lack of diagnosis causing graft failure, possible rejection despite appropriate immunotherapy, and optimal immunotherapy plan if re-transplanted.     Transplant Status:  - Status 1 for heart-kidney: consent to be obtained today   - ABO  status: O+  - HLA/DSA: remain negative. Repeat pending 4/2/24  - Prospective cross match with every donor offer  - Due to potential risk of hyperacute allograft rejection, induction plan will be 500mg solumedrol x 2 (followed by steroid taper) and thymoglobulin     Recommendations:  - Switch to tacrolimus 3mg BID PO capsule   - Increase myfortic acid to 360mg BID   - Vascular surgery consult to assess SFA occlusions and need for intervention   - HLA panel sent this AM; HLA lab to run this evening with results tomorrow AM  - Transplant coordinator to obtain listing consent with patient and her mother today   - Agree with weaning impella to P2 for LV venting   - Agree with negative fluid status w/ goal 500-1L negative in 24hrs   - Agree with deescalating antibiotics  - Agree with increasing systemic heparin for MCS/DVTs and SFA anticoagulation   - Please avoid blood product transfusions if possible     Continue excellent care per CTICU team.      Patient was examined and discussed with Advanced Heart Failure and Transplant Cardiology attending physician, Dr. Kendrick Jain, APRN-CNP

## 2024-04-02 NOTE — PROGRESS NOTES
"Charla Bowles is a pleasant 34 y.o. female on day 47 of admission presenting with Cardiogenic shock (CMS/HCC).    Subjective   Vascular surgery re-engaged for transplant clearance. Patient is currently listed as status 1. Patient had arterial duplex on 3/27 which showed incidental proximal SFA occlusion with collateral flow.        Objective   Last Recorded Vitals  Blood pressure 78/74, pulse (!) 124, temperature 35.7 °C (96.3 °F), temperature source Axillary, resp. rate (!) 37, height 1.549 m (5' 0.98\"), weight 90.3 kg (199 lb 1.2 oz), SpO2 93 %.    Intake/Output last 3 Shifts:  I/O last 3 completed shifts:  In: 3709.5 (41.1 mL/kg) [I.V.:1149.5 (12.7 mL/kg); Blood:300; NG/GT:860; IV Piggyback:1400]  Out: 4787 (53 mL/kg) [Other:4787]  Weight: 90.3 kg     Physical Exam  CONSTITUTIONAL: Alert and oriented. No acute distress.  EYES: No scleral icterus. Conjunctiva clear.  RESPIRATORY: Normal effort on room air. No stridor.  ABDOMINAL: Slightly protuberant. Nondistended.   MUSCULOSKELETAL: Normal strength and tone.  SKIN: Normal tugor. ECMO cannulas in place.  EXTREMITIES: No gross extremity deformity.  NEUROLOGICAL: Grossly intact. No focal deficits.     CARDIAC: Regular rate and rhythm on the monitor.  VASCULAR:  Palpable left PT, multiphasic left DP  Monophasic right DP     Relevant Results  Medications:  Scheduled Meds:acetaminophen, 650 mg, oral, q6h  calcium carbonate-vitamin D3, 1 tablet, oral, Daily  cyanocobalamin, 500 mcg, oral, Daily  darbepoetin floyd, 100 mcg, subcutaneous, q14 days  ferrous sulfate (325 mg ferrous sulfate), 65 mg of iron, oral, Daily with breakfast  folic acid, 1 mg, oral, Daily  insulin lispro, 0-15 Units, subcutaneous, Before meals & nightly  levothyroxine, 200 mcg, oral, Daily  lidocaine, 1 patch, transdermal, Daily  melatonin, 5 mg, oral, Nightly  melatonin, 5 mg, oral, Daily  multivitamin with minerals, 1 tablet, oral, Daily  mycophenolate, 360 mg, oral, BID  nystatin, 5 mL, " Swish & Swallow, q6h  oxygen, , inhalation, Continuous - Inhalation  oxymetazoline, 2 spray, Left Nostril, q12h  pantoprazole, 40 mg, intravenous, Daily  piperacillin-tazobactam, 4.5 g, intravenous, q6h  predniSONE, 10 mg, oral, Daily  psyllium, 1 packet, oral, Daily  rosuvastatin, 40 mg, oral, Nightly  sennosides-docusate sodium, 1 tablet, oral, BID  sod phos di, mono-K phos mono, 500 mg, oral, BID  sodium chloride, 1 spray, Each Nostril, 4x daily  [Held by provider] sulfamethoxazole-trimethoprim, 80 mg of trimethoprim, oral, Daily  tacrolimus, 3 mg, oral, q12h TRICIA  traZODone, 25 mg, oral, Nightly  valGANciclovir, 450 mg, oral, q48h      Continuous Infusions:heparin, 200 Units/hr, Last Rate: 200 Units/hr (04/02/24 0930)  lactated Ringer's, 5 mL/hr, Last Rate: 5 mL/hr (04/02/24 0700)  PrismaSol 4/2.5, 25 mL/kg/hr  sodium bicarbonate infusion Impella Purge 25 mEq/1000 mL D5W, 10 mL/hr, Last Rate: 10 mL/hr (04/02/24 0700)      PRN Meds:.PRN medications: bisacodyl, calcium gluconate, calcium gluconate, dextrose, dextrose **OR** glucagon, HYDROmorphone, artificial tears, magnesium sulfate, magnesium sulfate, microfibrllar collagen, mupirocin, naloxone, ondansetron, oxyCODONE, sodium chloride    Labs:  Results from last 7 days   Lab Units 04/02/24  0008 04/01/24  1212 04/01/24  0233   SODIUM mmol/L 136 137 138   POTASSIUM mmol/L 4.3 4.6 4.7   CHLORIDE mmol/L 101 102 102   BUN mg/dL 11 13 14   CREATININE mg/dL 0.82 0.85 0.80     Results from last 7 days   Lab Units 04/02/24  0008 04/01/24  1636 04/01/24  0233   WBC AUTO x10*3/uL 4.9 5.2 4.0*   HEMOGLOBIN g/dL 7.0* 7.3* 7.6*   HEMATOCRIT % 22.4* 23.7* 24.0*   PLATELETS AUTO x10*3/uL 33* 37* 36*       Imaging:  Imaging from the last 24 hours reviewed independently by the vascular surgery team.        Assessment/Plan   Charla Silvagarry Bowles is a pleasant 34 y.o. female in the CTICU in the setting of cardiogenic shock with cannulation of the left CFA and CFV for VA ECMO and  high pharmacomechanical circulatory support. Currently listed as status 1 for heart and kidney transplant.     Plan:  No indication for surgical intervention given that patient is asymptomatic and has palpable pulses  Clear for transplant operations from vascular surgery perspective     Patient discussed with Dr. Subramanian and Dr. Rivera.    Adri Elliott MD  Vascular Surgery Fellow  Team Pager [phone]  Available on Secure Chat

## 2024-04-02 NOTE — LETTER
April 2, 2024    Charla REGINALD Bowles  [address] [address]      Dear Ms. Yimi Bowles:    Our multi-disciplinary transplant team completed a review of your medical records on 4/2/2024.  I am pleased to inform you that you will be placed on the [facility] ([facility]) waiting list for a Heart transplant.    Our transplant program consists of surgeons and medical doctors who provide coverage 365 days a year, 24 hours a day.     If you have any questions or concerns regarding your insurance coverage or billing issues, a  is available to speak with you.     It is important to keep us updated of any major changes in your medical condition, contact information and health insurance coverage.     Please don't hesitate to contact us at Dept: [phone] with any questions or concerns. We look forward to working with you through this process.      Sincerely,      Lexie Wright MD MPH          The [facility] Toll-free Patient Services Line:  Your Resource for Organ Transplant Information    If you have a question regarding your own medical care, you always should call your transplant hospital first. However, for general organ transplant-related information, you should call the [facility] (UN[facility]) toll-free patient services line at [phone].  Anyone, including potential transplant candidates, candidates, recipients, family members, friends, living donors, and donor family members, can call this number to:    Talk about organ donation, living donation, the transplant process, the donation process, and transplant policies.  Get a free patient information kit with helpful booklets, waiting list and transplant information, and a list of all transplant hospitals.  Ask questions about the Organ Procurement and Transplantation Network (OPTN) web site (http://optn.transplant.hrsa.gov/), the [facility]OS Web site (http://unos.org/), or the UNOS web site for living  donors and transplant recipients. (http://www.transplantliving.org/).  Learn how [facility] and the OPTN can help you.  Talk about any concerns that you may have with a transplant hospital.    [employer] is a not-for-profit organization that provides the administrative services for the national OPTN under federal contract to the Health Resources and Services Administration (HRSA), an agency under the U.S. Department of Health and Human Services (HHS).    [facility] and the OPTN are responsible for:    Providing educational material for patients, the public, and professionals.  Raising awareness of the need for donated organs and tissue.  Writing organ transplant policy with help from transplant professionals, transplant patients, transplant candidates, donor families, living donors, and the public.  Coordinating organ procurement, matching, and placement.  Collecting information about every organ transplant and donation that occurs in the [place].    Remember, you should contact your transplant hospital directly if you have questions or concerns about your own medical care including medical records, work-up progress, and test results.    [facility] is not your transplant hospital, and staff at [facility] will not be able to transfer you to your transplant hospital, so keep your transplant hospital’s phone number handy.    However, while you research your transplant needs and learn as much as you can about transplantation and donation, we welcome your call to our toll-free patient services line at [phone].      [facility] PIL Final Rev 1-

## 2024-04-02 NOTE — PROGRESS NOTES
"        INPATIENT TRANSPLANT NEPHROLOGY PROGRESS NOTE          REASON FOR CONSULT:  Immunosuppressive medication management and nephrology related issues.    SUBJECTION:  CVVH Procedure note    ECMO  On pressors  Tolerated CVVH well    Pt extubated and now on room air. Remains on ECMO.     PHYCISCAL EXAMINATION:    Visit Vitals  BP 78/74   Pulse (!) 125   Temp 35.7 °C (96.3 °F) (Axillary)   Resp (!) 43   Ht 1.549 m (5' 0.98\")   Wt 90.3 kg (199 lb 1.2 oz)   SpO2 93%   BMI 37.63 kg/m²   OB Status Hysterectomy   Smoking Status Never   BSA 1.97 m²        03/31 1900 - 04/02 0659  In: 3709.5 [I.V.:1149.5]  Out: 4787      Weight change:     Constitutional:       General: She is not in acute distress.     Appearance: Normal appearance. A&OX3  HENT:      Head: Normocephalic.      Mouth/Throat:      Mouth: Mucous membranes are moist.   Neck:      Comments: RIJ dialysis line present - CDI  Cardiovascular:      Rate and Rhythm: Regular rhythm. Tachycardia present.      Pulses: Normal pulses.      Heart sounds: Normal heart sounds.   Pulmonary:      Effort: Pulmonary effort is normal. No respiratory distress.      Breath sounds: Normal breath sounds.   Chest:      Comments: Right axillary impella site CDI   Abdominal:      General: Bowel sounds are normal.      Palpations: Abdomen is soft.      Tenderness: There is no abdominal tenderness.   Musculoskeletal:         General: Normal range of motion.      Cervical back: Normal range of motion.      Right lower leg: Edema present.      Left lower leg: Edema present.   Skin:     General: Skin is warm and dry.      Comments: Left groin ECMO cannulation site with drainage    Neurological:      General: No focal deficit present.      Mental Status: She is alert and oriented to person, place, and time.   Psychiatric:         Behavior: Behavior normal. Behavior is cooperative.     MEDICATION LIST: REVIEWED    acetaminophen, 650 mg, q6h  calcium carbonate-vitamin D3, 1 tablet, " Daily  cyanocobalamin, 500 mcg, Daily  darbepoetin floyd, 100 mcg, q14 days  ferrous sulfate (325 mg ferrous sulfate), 65 mg of iron, Daily with breakfast  folic acid, 1 mg, Daily  insulin lispro, 0-15 Units, Before meals & nightly  levothyroxine, 200 mcg, Daily  lidocaine, 1 patch, Daily  melatonin, 5 mg, Nightly  melatonin, 5 mg, Daily  multivitamin with minerals, 1 tablet, Daily  mycophenolate, 360 mg, BID  nystatin, 5 mL, q6h  oxygen, , Continuous - Inhalation  oxymetazoline, 2 spray, q12h  pantoprazole, 40 mg, Daily  piperacillin-tazobactam, 4.5 g, q6h  predniSONE, 10 mg, Daily  psyllium, 1 packet, Daily  rosuvastatin, 40 mg, Nightly  sennosides-docusate sodium, 1 tablet, BID  sod phos di, mono-K phos mono, 500 mg, BID  sodium chloride, 1 spray, 4x daily  [Held by provider] sulfamethoxazole-trimethoprim, 80 mg of trimethoprim, Daily  tacrolimus, 3 mg, q12h TRICIA  traZODone, 25 mg, Nightly  valGANciclovir, 450 mg, q48h      heparin, Last Rate: 200 Units/hr (04/02/24 0930)  lactated Ringer's, Last Rate: 5 mL/hr (04/02/24 0700)  PrismaSol 4/2.5  sodium bicarbonate infusion Impella Purge 25 mEq/1000 mL D5W, Last Rate: 10 mL/hr (04/02/24 0700)      bisacodyl, 10 mg, Daily PRN  calcium gluconate, 1 g, q6h PRN  calcium gluconate, 2 g, q6h PRN  dextrose, 25 g, q15 min PRN  dextrose, 25 g, q15 min PRN   Or  glucagon, 1 mg, q15 min PRN  HYDROmorphone, 0.2 mg, q4h PRN  artificial tears, 2 drop, PRN  magnesium sulfate, 2 g, q6h PRN  magnesium sulfate, 4 g, q6h PRN  microfibrllar collagen, , PRN  mupirocin, , BID PRN  naloxone, 0.2 mg, q5 min PRN  ondansetron, 4 mg, q4h PRN  oxyCODONE, 5 mg, q4h PRN  sodium chloride, 1 spray, 4x daily PRN        ALLERGY:  Allergies   Allergen Reactions    Dapagliflozin GI bleeding and Bleeding     UTI    Urinary tract infection    Empagliflozin Unknown    Myfortic [Mycophenolate Sodium] GI Upset    Topiramate Nausea Only and Nausea/vomiting    Latex Rash       LABS:  Results for orders placed  or performed during the hospital encounter of 02/15/24 (from the past 24 hour(s))   Heparin Assay, UFH   Result Value Ref Range    Heparin Unfractionated <0.1 See Comment Below for Therapeutic Ranges IU/mL   Respiratory Culture/Smear    Specimen: SPUTUM; Fluid   Result Value Ref Range    Respiratory Culture/Smear No growth to date     Gram Stain       Gram stain indicates specimen consists of lower respiratory tract secretions.    Gram Stain No predominant organism    MRSA Surveillance for Vancomycin De-escalation, PCR    Specimen: Anterior Nares; Swab   Result Value Ref Range    MRSA PCR Not Detected Not Detected   Blood Gas Arterial Full Panel   Result Value Ref Range    POCT pH, Arterial 7.46 (H) 7.38 - 7.42 pH    POCT pCO2, Arterial 37 (L) 38 - 42 mm Hg    POCT pO2, Arterial 290 (H) 85 - 95 mm Hg    POCT SO2, Arterial 100 94 - 100 %    POCT Oxy Hemoglobin, Arterial 96.1 94.0 - 98.0 %    POCT Hematocrit Calculated, Arterial 23.0 (L) 36.0 - 46.0 %    POCT Sodium, Arterial 135 (L) 136 - 145 mmol/L    POCT Potassium, Arterial 4.8 3.5 - 5.3 mmol/L    POCT Chloride, Arterial 106 98 - 107 mmol/L    POCT Ionized Calcium, Arterial 1.05 (L) 1.10 - 1.33 mmol/L    POCT Glucose, Arterial 150 (H) 74 - 99 mg/dL    POCT Lactate, Arterial 0.7 0.4 - 2.0 mmol/L    POCT Base Excess, Arterial 2.3 -2.0 - 3.0 mmol/L    POCT HCO3 Calculated, Arterial 26.3 (H) 22.0 - 26.0 mmol/L    POCT Hemoglobin, Arterial 7.6 (L) 12.0 - 16.0 g/dL    POCT Anion Gap, Arterial 8 (L) 10 - 25 mmo/L    Patient Temperature 37.0 degrees Celsius    FiO2 30 %   CBC   Result Value Ref Range    WBC 5.2 4.4 - 11.3 x10*3/uL    nRBC 2.7 (H) 0.0 - 0.0 /100 WBCs    RBC 2.64 (L) 4.00 - 5.20 x10*6/uL    Hemoglobin 7.3 (L) 12.0 - 16.0 g/dL    Hematocrit 23.7 (L) 36.0 - 46.0 %    MCV 90 80 - 100 fL    MCH 27.7 26.0 - 34.0 pg    MCHC 30.8 (L) 32.0 - 36.0 g/dL    RDW 23.7 (H) 11.5 - 14.5 %    Platelets 37 (LL) 150 - 450 x10*3/uL   Blood Culture    Specimen: Peripheral  Venipuncture; Blood culture   Result Value Ref Range    Blood Culture Loaded on Instrument - Culture in progress    Blood Culture    Specimen: Peripheral Venipuncture; Blood culture   Result Value Ref Range    Blood Culture Loaded on Instrument - Culture in progress    POCT GLUCOSE   Result Value Ref Range    POCT Glucose 159 (H) 74 - 99 mg/dL   Reticulocytes   Result Value Ref Range    Retic % 4.2 (H) 0.5 - 2.0 %    Retic Absolute 0.104 (H) 0.018 - 0.083 x10*6/uL    Reticulocyte Hemoglobin 32 28 - 38 pg    Immature Retic fraction 37.2 (H) <=16.0 %   Haptoglobin   Result Value Ref Range    Haptoglobin <10 (L) 37 - 246 mg/dL   CBC and Auto Differential   Result Value Ref Range    WBC 4.9 4.4 - 11.3 x10*3/uL    nRBC 2.6 (H) 0.0 - 0.0 /100 WBCs    RBC 2.47 (L) 4.00 - 5.20 x10*6/uL    Hemoglobin 7.0 (L) 12.0 - 16.0 g/dL    Hematocrit 22.4 (L) 36.0 - 46.0 %    MCV 91 80 - 100 fL    MCH 28.3 26.0 - 34.0 pg    MCHC 31.3 (L) 32.0 - 36.0 g/dL    RDW 23.4 (H) 11.5 - 14.5 %    Platelets 33 (LL) 150 - 450 x10*3/uL    Immature Granulocytes %, Automated 6.3 (H) 0.0 - 0.9 %    Immature Granulocytes Absolute, Automated 0.31 0.00 - 0.70 x10*3/uL   Lactate Dehydrogenase   Result Value Ref Range    LDH 1,397 (H) 84 - 246 U/L   Calcium, Ionized   Result Value Ref Range    POCT Calcium, Ionized 1.01 (L) 1.1 - 1.33 mmol/L   Coagulation Screen   Result Value Ref Range    Protime 13.9 (H) 9.8 - 12.8 seconds    INR 1.2 (H) 0.9 - 1.1    aPTT 35 27 - 38 seconds   Heparin Assay, UFH   Result Value Ref Range    Heparin Unfractionated <0.1 See Comment Below for Therapeutic Ranges IU/mL   Hepatic function panel   Result Value Ref Range    Albumin 4.5 3.4 - 5.0 g/dL    Bilirubin, Total 2.2 (H) 0.0 - 1.2 mg/dL    Bilirubin, Direct 1.1 (H) 0.0 - 0.3 mg/dL    Alkaline Phosphatase 162 (H) 33 - 110 U/L    ALT 19 7 - 45 U/L     (H) 9 - 39 U/L    Total Protein 6.2 (L) 6.4 - 8.2 g/dL   Magnesium   Result Value Ref Range    Magnesium 2.29 1.60 -  2.40 mg/dL   Basic Metabolic Panel   Result Value Ref Range    Glucose 63 (L) 74 - 99 mg/dL    Sodium 136 136 - 145 mmol/L    Potassium 4.3 3.5 - 5.3 mmol/L    Chloride 101 98 - 107 mmol/L    Bicarbonate 27 21 - 32 mmol/L    Anion Gap 12 10 - 20 mmol/L    Urea Nitrogen 11 6 - 23 mg/dL    Creatinine 0.82 0.50 - 1.05 mg/dL    eGFR >90 >60 mL/min/1.73m*2    Calcium 8.4 (L) 8.6 - 10.6 mg/dL   Phosphorus   Result Value Ref Range    Phosphorus 1.7 (L) 2.5 - 4.9 mg/dL   Blood Gas Arterial Full Panel   Result Value Ref Range    POCT pH, Arterial 7.43 (H) 7.38 - 7.42 pH    POCT pCO2, Arterial 38 38 - 42 mm Hg    POCT pO2, Arterial 266 (H) 85 - 95 mm Hg    POCT SO2, Arterial 100 94 - 100 %    POCT Oxy Hemoglobin, Arterial 96.6 94.0 - 98.0 %    POCT Hematocrit Calculated, Arterial 28.0 (L) 36.0 - 46.0 %    POCT Sodium, Arterial 133 (L) 136 - 145 mmol/L    POCT Potassium, Arterial 4.0 3.5 - 5.3 mmol/L    POCT Chloride, Arterial 104 98 - 107 mmol/L    POCT Ionized Calcium, Arterial 1.02 (L) 1.10 - 1.33 mmol/L    POCT Glucose, Arterial 56 (L) 74 - 99 mg/dL    POCT Lactate, Arterial 0.8 0.4 - 2.0 mmol/L    POCT Base Excess, Arterial 0.9 -2.0 - 3.0 mmol/L    POCT HCO3 Calculated, Arterial 25.2 22.0 - 26.0 mmol/L    POCT Hemoglobin, Arterial 9.2 (L) 12.0 - 16.0 g/dL    POCT Anion Gap, Arterial 8 (L) 10 - 25 mmo/L    Patient Temperature 37.0 degrees Celsius    FiO2 40 %   Manual Differential   Result Value Ref Range    Neutrophils %, Manual 80.5 40.0 - 80.0 %    Lymphocytes %, Manual 6.8 13.0 - 44.0 %    Monocytes %, Manual 8.5 2.0 - 10.0 %    Eosinophils %, Manual 0.8 0.0 - 6.0 %    Basophils %, Manual 0.0 0.0 - 2.0 %    Myelocytes %, Manual 3.4 0.0 - 0.0 %    Seg Neutrophils Absolute, Manual 3.94 1.20 - 7.00 x10*3/uL    Lymphocytes Absolute, Manual 0.33 (L) 1.20 - 4.80 x10*3/uL    Monocytes Absolute, Manual 0.42 0.10 - 1.00 x10*3/uL    Eosinophils Absolute, Manual 0.04 0.00 - 0.70 x10*3/uL    Basophils Absolute, Manual 0.00 0.00 -  0.10 x10*3/uL    Myelocytes Absolute, Manual 0.17 0.00 - 0.00 x10*3/uL    Total Cells Counted 118     RBC Morphology See Below     Polychromasia Mild     Hypochromia Mild     RBC Fragments Few     Target Cells Few     Ovalocytes Few    POCT GLUCOSE   Result Value Ref Range    POCT Glucose 62 (L) 74 - 99 mg/dL   POCT GLUCOSE   Result Value Ref Range    POCT Glucose 71 (L) 74 - 99 mg/dL   ECMO Arterial Blood Gas Unsolicited   Result Value Ref Range    POCT pH, Arterial ECMO 7.27 Reference range not established pH    POCT pCO2, Arterial ECMO 61 Reference range not established mm Hg    POCT pO2,  Arterial ECMO 397 Reference range not established mm Hg    POCT SO2, Arterial ECMO 100 Reference range not established %    POCT Oxy Hemoglobin, Arterial ECMO 96.8 Reference range not established %    POCT Base Excess, Arterial ECMO -0.3 Reference range not established mmol/L    POCT HCO3 Calculated, Arterial ECMO 28.0 Reference range not established mmol/L    Patient Temperature 37.0 degrees Celsius   ECMO Venous Full Panel Unsolicited   Result Value Ref Range    POCT pH, Venous ECMO 7.29 Reference range not established pH    POCT pCO2, Venous ECMO 58 Reference range not established mm Hg    POCT pO2,  Venous  ECMO 48 Reference range not established mm Hg    POCT SO2, Venous ECMO 85 Reference range not established %    POCT Oxy Hemoglobin, Venous ECMO 82.0 Reference range not established %    POCT Hematocrit Calculated, Venous ECMO 23.0 (L) 36.0 - 46.0 %    POCT Sodium, Venous ECMO 134 (L) 136 - 145 mmol/L    POCT Potassium, Venous ECMO 4.5 3.5 - 5.3 mmol/L    POCT Chloride, Venous ECMO 103 98 - 107 mmol/L    POCT Ionized Calcium, Venous ECMO 1.11 1.10 - 1.33 mmol/L    POCT Glucose, Venous ECMO 90 74 - 99 mg/dL    POCT Lactate Venous ECMO 0.6 0.4 - 2.0 mmol/L    POCT Base Excess, Venous ECMO 1.0 Reference range not established mmol/L    POCT HCO3 Calculated, Venous ECMO 27.9 Reference range not established mmol/L    POCT  Hemoglobin, Venous ECMO 7.5 (L) 12.0 - 16.0 g/dL    POCT Anion Gap, Venous ECMO 8 Reference range not established mmo/L    Patient Temperature 37.0 degrees Celsius   POCT GLUCOSE   Result Value Ref Range    POCT Glucose 109 (H) 74 - 99 mg/dL   Tacrolimus level   Result Value Ref Range    Tacrolimus  3.9 <=15.0 ng/mL   POCT GLUCOSE   Result Value Ref Range    POCT Glucose 125 (H) 74 - 99 mg/dL   POCT GLUCOSE   Result Value Ref Range    POCT Glucose 145 (H) 74 - 99 mg/dL     *Note: Due to a large number of results and/or encounters for the requested time period, some results have not been displayed. A complete set of results can be found in Results Review.        ASSESSMENT AND PLAN:    Ms. Yimi Bowles is a 34 y.o. female who underwent a kidney transplant surgery  on 3/31/2022 (Heart).    Principal Problem:    Cardiogenic shock (CMS/[facility])  Active Problems:    Heart transplant recipient (CMS/[facility])    ESRD (end stage renal disease) on dialysis (CMS/[facility])    HIRAM (acute kidney injury) (CMS/[facility])    Acute passive congestion of liver    Hyponatremia    Iron deficiency anemia    Abdominal pain    Cardiac transplant rejection (CMS/[facility])    Acute combined systolic (congestive) and diastolic (congestive) heart failure (CMS/[facility])      CRRT Management Note:  #HIRAM-D, 2/2 ATN in setting of cardiogenic shock. Started on CRRT initially 2/19 and transitioned to iHD however unable to achieve optimal fluid management so transitioned back to CRRT   - Phosphorus low -> please replete phosphorus   - Will continue CVVH, fluid removal per ICU team .  -Please replete ionized calcium and phosphorus based on the CRRT protocol.  -Daily evaluation for indications for CVVH and will convert it to regular IHD once the patient is more hemodynamically stable.    Heart and Kidney transplant Candidacy :   Will meet the OPTN eligibility criteria for kidney transplant on 3/29/24 for sustained HIRAM over 6 weeks. Pre tx work up - per heart tx team.   Note:  GFR  2/17/24=23  She is approved for heart  TXP candidate, will update the consent for kidney transplant.    [Sustained acute kidney injury : At least one of the following, or a combination of both of the following, for the last 6 weeks:    That the candidate has been on dialysis at least once every 7 days.    That the candidate has a measured or calculated CrCl or GFR less than or equal to 25 mL/min at least once every 7 days]        For questions, please contact transplant nephrology page x [phone]    Josue Gil, DO  PGY 4 Nephrology Fellow

## 2024-04-03 NOTE — PROGRESS NOTES
[facility]  ADVANCED HEART FAILURE AND TRANSPLANT CARDIOLOGY   Charla Bowles/[id number]    Admit Date: 2/15/2024  Hospital Length of Stay: 48   ICU Length of Stay: 6d 15h   Primary Service: CTICU      Charla Bowles is a 34 y.o. female on day 48 of admission presenting with Cardiogenic shock (CMS/HCC).    Subjective       Objective     Physical Exam  Constitutional:       Appearance: She is ill-appearing.   HENT:      Head: Normocephalic.      Comments: Not actively bleeding      Mouth/Throat:      Mouth: Mucous membranes are dry.      Pharynx: Oropharynx is clear. No oropharyngeal exudate or posterior oropharyngeal erythema.   Eyes:      Extraocular Movements: Extraocular movements intact.      Conjunctiva/sclera: Conjunctivae normal.      Pupils: Pupils are equal, round, and reactive to light.   Neck:      Vascular: No JVD.   Cardiovascular:      Rate and Rhythm: Tachycardia present.      Comments: Right axillary impella in place ~45cm at hub (no hematoma), Right femoral VA ECMO in place with reperfusion catheter (site appears clean and dry). Dopplerable radial and ulnar pulses bilaterally. Dopplerable DP/PT pulses bilateral.   Pulmonary:      Effort: Pulmonary effort is normal. No accessory muscle usage, respiratory distress or retractions.      Breath sounds: Normal breath sounds. No wheezing, rhonchi or rales.      Comments: 1L NC.  Abdominal:      General: Abdomen is flat. Bowel sounds are normal. There is no distension.      Palpations: Abdomen is soft. There is no mass.      Hernia: No hernia is present.   Musculoskeletal:         General: Swelling (+2-3 BLE edema) present. No tenderness. Normal range of motion.      Cervical back: Full passive range of motion without pain.   Skin:     General: Skin is dry.      Capillary Refill: Capillary refill takes less than 2 seconds.      Coloration: Skin is not jaundiced.      Findings: Bruising and lesion present. No  "erythema, laceration, rash or wound.   Neurological:      General: No focal deficit present.      Mental Status: She is alert and oriented to person, place, and time.   Psychiatric:         Mood and Affect: Mood normal.         Behavior: Behavior normal.         Last Recorded Vitals  Blood pressure 96/86, pulse (!) 120, temperature 36.4 °C (97.5 °F), temperature source Temporal, resp. rate (!) 54, height 1.549 m (5' 0.98\"), weight 90.3 kg (199 lb 1.2 oz), SpO2 100 %.  Intake/Output last 3 Shifts:  I/O last 3 completed shifts:  In: 1777.2 (19.7 mL/kg) [I.V.:627.2 (6.9 mL/kg); IV Piggyback:1150]  Out: 3830 (42.4 mL/kg) [Other:3830]  Weight: 90.3 kg     Relevant Results  Scheduled medications  acetaminophen, 650 mg, oral, q6h  calcium carbonate-vitamin D3, 1 tablet, oral, Daily  cyanocobalamin, 500 mcg, oral, Daily  darbepoetin floyd, 100 mcg, subcutaneous, q14 days  ferrous sulfate (325 mg ferrous sulfate), 65 mg of iron, oral, Daily with breakfast  folic acid, 1 mg, oral, Daily  insulin lispro, 0-5 Units, subcutaneous, TID with meals  levothyroxine, 200 mcg, oral, Daily  lidocaine, 1 patch, transdermal, Daily  melatonin, 10 mg, oral, Daily  multivitamin with minerals, 1 tablet, oral, Daily  mycophenolate, 360 mg, oral, BID  nystatin, 5 mL, Swish & Swallow, q6h  oxygen, , inhalation, Continuous - Inhalation  oxymetazoline, 2 spray, Left Nostril, q12h  oxymetazoline, 2 spray, Left Nostril, q12h  pantoprazole, 40 mg, intravenous, Daily  predniSONE, 10 mg, oral, Daily  psyllium, 1 packet, oral, Daily  QUEtiapine, 50 mg, oral, Nightly  rosuvastatin, 40 mg, oral, Nightly  sennosides-docusate sodium, 1 tablet, oral, BID  sod phos di, mono-K phos mono, 500 mg, oral, 4x daily  sodium chloride, 1 spray, Each Nostril, 4x daily  [Held by provider] sulfamethoxazole-trimethoprim, 80 mg of trimethoprim, oral, Daily  tacrolimus, 3 mg, oral, q12h TRICIA  valGANciclovir, 450 mg, oral, q48h      Continuous medications  [Held by provider] " dexmedeTOMIDine, 0.1-1.5 mcg/kg/hr, Last Rate: Stopped (04/03/24 0825)  heparin, 300 Units/hr, Last Rate: 300 Units/hr (04/03/24 1100)  lactated Ringer's, 5 mL/hr, Last Rate: 5 mL/hr (04/03/24 1100)  PrismaSol 4/2.5, 25 mL/kg/hr  sodium bicarbonate infusion Impella Purge 25 mEq/1000 mL D5W, 10 mL/hr, Last Rate: 10 mL/hr (04/03/24 1100)      PRN medications  PRN medications: bisacodyl, calcium gluconate, calcium gluconate, dextrose, dextrose **OR** glucagon, hydrALAZINE, HYDROmorphone, artificial tears, magnesium sulfate, magnesium sulfate, microfibrllar collagen, mupirocin, naloxone, ondansetron, oxyCODONE, sodium chloride     Results for orders placed or performed during the hospital encounter of 02/15/24 (from the past 24 hour(s))   POCT GLUCOSE   Result Value Ref Range    POCT Glucose 145 (H) 74 - 99 mg/dL   Calcium, Ionized   Result Value Ref Range    POCT Calcium, Ionized 1.00 (L) 1.1 - 1.33 mmol/L   CBC   Result Value Ref Range    WBC 5.6 4.4 - 11.3 x10*3/uL    nRBC 3.1 (H) 0.0 - 0.0 /100 WBCs    RBC 2.72 (L) 4.00 - 5.20 x10*6/uL    Hemoglobin 7.7 (L) 12.0 - 16.0 g/dL    Hematocrit 24.0 (L) 36.0 - 46.0 %    MCV 88 80 - 100 fL    MCH 28.3 26.0 - 34.0 pg    MCHC 32.1 32.0 - 36.0 g/dL    RDW 23.5 (H) 11.5 - 14.5 %    Platelets 60 (L) 150 - 450 x10*3/uL   Renal Function Panel   Result Value Ref Range    Glucose 141 (H) 74 - 99 mg/dL    Sodium 134 (L) 136 - 145 mmol/L    Potassium 4.5 3.5 - 5.3 mmol/L    Chloride 101 98 - 107 mmol/L    Bicarbonate 22 21 - 32 mmol/L    Anion Gap 16 10 - 20 mmol/L    Urea Nitrogen 11 6 - 23 mg/dL    Creatinine 0.79 0.50 - 1.05 mg/dL    eGFR >90 >60 mL/min/1.73m*2    Calcium 7.9 (L) 8.6 - 10.6 mg/dL    Phosphorus 2.7 2.5 - 4.9 mg/dL    Albumin 4.4 3.4 - 5.0 g/dL   Heparin Assay, UFH   Result Value Ref Range    Heparin Unfractionated 0.1 See Comment Below for Therapeutic Ranges IU/mL   POCT GLUCOSE   Result Value Ref Range    POCT Glucose 126 (H) 74 - 99 mg/dL   ECMO Arterial Blood Gas    Result Value Ref Range    POCT pH, Arterial ECMO 7.17 Reference range not established pH    POCT pCO2, Arterial ECMO 70 Reference range not established mm Hg    POCT pO2,  Arterial ECMO 455 Reference range not established mm Hg    POCT SO2, Arterial ECMO 100 Reference range not established %    POCT Oxy Hemoglobin, Arterial ECMO 97.0 Reference range not established %    POCT Base Excess, Arterial ECMO -3.2 Reference range not established mmol/L    POCT HCO3 Calculated, Arterial ECMO 25.5 Reference range not established mmol/L    Patient Temperature 37.0 degrees Celsius    FiO2 100 %   ECMO ARTERIAL FULL PANEL   Result Value Ref Range    POCT pH, Arterial ECMO 7.17 Reference range not established pH    POCT pCO2, Arterial ECMO 70 Reference range not established mm Hg    POCT pO2,  Arterial ECMO 455 Reference range not established mm Hg    POCT SO2, Arterial ECMO 100 Reference range not established %    POCT Oxy Hemoglobin, Arterial ECMO 97.0 Reference range not established %    POCT Hematocrit Calculated, Arterial ECMO 23.0 (L) 36.0 - 46.0 %    POCT Sodium, Arterial  ECMO 132 (L) 136 - 145 mmol/L    POCT Potassium, Arterial  ECMO 4.6 3.5 - 5.3 mmol/L    POCT Chloride, Arterial  ECMO 101 98 - 107 mmol/L    POCT Ionized Calcium, Arterial  ECMO 1.16 1.10 - 1.33 mmol/L    POCT Glucose, Arterial  ECMO 124 (H) 74 - 99 mg/dL    POCT Lactate Arterial  ECMO 0.6 0.4 - 2.0 mmol/L    POCT Base Excess, Arterial ECMO -3.2 Reference range not established mmol/L    POCT HCO3 Calculated, Arterial ECMO 25.5 Reference range not established mmol/L    POCT Hemoglobin, Arterial  ECMO 7.7 (L) 12.0 - 16.0 g/dL    POCT Anion Gap, Arterial  ECMO 10 Reference range not established mmo/L    Patient Temperature 37.0 degrees Celsius    FiO2 100 %   Tacrolimus level   Result Value Ref Range    Tacrolimus  4.1 <=15.0 ng/mL   Reticulocytes   Result Value Ref Range    Retic % 4.8 (H) 0.5 - 2.0 %    Retic Absolute 0.119 (H) 0.018 - 0.083 x10*6/uL     Reticulocyte Hemoglobin 30 28 - 38 pg    Immature Retic fraction 34.4 (H) <=16.0 %   Haptoglobin   Result Value Ref Range    Haptoglobin <10 (L) 37 - 246 mg/dL   CBC and Auto Differential   Result Value Ref Range    WBC 5.2 4.4 - 11.3 x10*3/uL    nRBC 3.8 (H) 0.0 - 0.0 /100 WBCs    RBC 2.50 (L) 4.00 - 5.20 x10*6/uL    Hemoglobin 6.9 (L) 12.0 - 16.0 g/dL    Hematocrit 22.3 (L) 36.0 - 46.0 %    MCV 89 80 - 100 fL    MCH 27.6 26.0 - 34.0 pg    MCHC 30.9 (L) 32.0 - 36.0 g/dL    RDW 23.4 (H) 11.5 - 14.5 %    Platelets 43 (L) 150 - 450 x10*3/uL    Immature Granulocytes %, Automated 9.2 (H) 0.0 - 0.9 %    Immature Granulocytes Absolute, Automated 0.48 0.00 - 0.70 x10*3/uL   ECMO Venous Blood Gas   Result Value Ref Range    POCT pH, Venous ECMO 7.19 Reference range not established pH    POCT pCO2, Venous ECMO 67 Reference range not established mm Hg    POCT pO2,  Venous  ECMO 380 Reference range not established mm Hg    POCT SO2, Venous ECMO 100 Reference range not established %    POCT Oxy Hemoglobin, Venous ECMO 96.1 Reference range not established %    POCT Base Excess, Venous ECMO -2.9 Reference range not established mmol/L    POCT HCO3 Calculated, Venous ECMO 25.6 Reference range not established mmol/L    Patient Temperature 37.0 degrees Celsius    FiO2 100 %   Lactate Dehydrogenase   Result Value Ref Range    LDH 1,036 (H) 84 - 246 U/L   Hepatic function panel   Result Value Ref Range    Albumin 4.2 3.4 - 5.0 g/dL    Bilirubin, Total 1.6 (H) 0.0 - 1.2 mg/dL    Bilirubin, Direct 0.8 (H) 0.0 - 0.3 mg/dL    Alkaline Phosphatase 157 (H) 33 - 110 U/L    ALT 18 7 - 45 U/L    AST 70 (H) 9 - 39 U/L    Total Protein 5.9 (L) 6.4 - 8.2 g/dL   Magnesium   Result Value Ref Range    Magnesium 2.19 1.60 - 2.40 mg/dL   ECMO VENOUS FULL PANEL   Result Value Ref Range    POCT pH, Venous ECMO 7.19 Reference range not established pH    POCT pCO2, Venous ECMO 67 Reference range not established mm Hg    POCT pO2,  Venous  ECMO 380  Reference range not established mm Hg    POCT SO2, Venous ECMO 100 Reference range not established %    POCT Oxy Hemoglobin, Venous ECMO 96.1 Reference range not established %    POCT Hematocrit Calculated, Venous ECMO 24.0 (L) 36.0 - 46.0 %    POCT Sodium, Venous ECMO 133 (L) 136 - 145 mmol/L    POCT Potassium, Venous ECMO 4.1 3.5 - 5.3 mmol/L    POCT Chloride, Venous ECMO 102 98 - 107 mmol/L    POCT Ionized Calcium, Venous ECMO 1.10 1.10 - 1.33 mmol/L    POCT Glucose, Venous ECMO 113 (H) 74 - 99 mg/dL    POCT Lactate Venous ECMO 0.9 0.4 - 2.0 mmol/L    POCT Base Excess, Venous ECMO -2.9 Reference range not established mmol/L    POCT HCO3 Calculated, Venous ECMO 25.6 Reference range not established mmol/L    POCT Hemoglobin, Venous ECMO 8.0 (L) 12.0 - 16.0 g/dL    POCT Anion Gap, Venous ECMO 10 Reference range not established mmo/L    Patient Temperature 37.0 degrees Celsius    FiO2 100 %   Renal Function Panel   Result Value Ref Range    Glucose 109 (H) 74 - 99 mg/dL    Sodium 135 (L) 136 - 145 mmol/L    Potassium 4.2 3.5 - 5.3 mmol/L    Chloride 100 98 - 107 mmol/L    Bicarbonate 22 21 - 32 mmol/L    Anion Gap 17 10 - 20 mmol/L    Urea Nitrogen 10 6 - 23 mg/dL    Creatinine 0.81 0.50 - 1.05 mg/dL    eGFR >90 >60 mL/min/1.73m*2    Calcium 7.6 (L) 8.6 - 10.6 mg/dL    Phosphorus 1.9 (L) 2.5 - 4.9 mg/dL    Albumin 4.2 3.4 - 5.0 g/dL   Manual Differential   Result Value Ref Range    Neutrophils %, Manual 81.9 40.0 - 80.0 %    Bands %, Manual 4.3 0.0 - 5.0 %    Lymphocytes %, Manual 6.0 13.0 - 44.0 %    Monocytes %, Manual 5.2 2.0 - 10.0 %    Eosinophils %, Manual 0.0 0.0 - 6.0 %    Basophils %, Manual 0.0 0.0 - 2.0 %    Metamyelocytes %, Manual 1.7 0.0 - 0.0 %    Myelocytes %, Manual 0.9 0.0 - 0.0 %    Seg Neutrophils Absolute, Manual 4.26 1.20 - 7.00 x10*3/uL    Bands Absolute, Manual 0.22 0.00 - 0.70 x10*3/uL    Lymphocytes Absolute, Manual 0.31 (L) 1.20 - 4.80 x10*3/uL    Monocytes Absolute, Manual 0.27 0.10 - 1.00  x10*3/uL    Eosinophils Absolute, Manual 0.00 0.00 - 0.70 x10*3/uL    Basophils Absolute, Manual 0.00 0.00 - 0.10 x10*3/uL    Metamyelocytes Absolute, Manual 0.09 0.00 - 0.00 x10*3/uL    Myelocytes Absolute, Manual 0.05 0.00 - 0.00 x10*3/uL    Total Cells Counted 116     Neutrophils Absolute, Manual 4.48 1.20 - 7.70 x10*3/uL    RBC Morphology See Below     RBC Fragments Few     Ovalocytes Few    Blood Gas Arterial Full Panel   Result Value Ref Range    POCT pH, Arterial 7.44 (H) 7.38 - 7.42 pH    POCT pCO2, Arterial 34 (L) 38 - 42 mm Hg    POCT pO2, Arterial 232 (H) 85 - 95 mm Hg    POCT SO2, Arterial 100 94 - 100 %    POCT Oxy Hemoglobin, Arterial 96.2 94.0 - 98.0 %    POCT Hematocrit Calculated, Arterial 22.0 (L) 36.0 - 46.0 %    POCT Sodium, Arterial 131 (L) 136 - 145 mmol/L    POCT Potassium, Arterial 4.2 3.5 - 5.3 mmol/L    POCT Chloride, Arterial 102 98 - 107 mmol/L    POCT Ionized Calcium, Arterial 1.01 (L) 1.10 - 1.33 mmol/L    POCT Glucose, Arterial 107 (H) 74 - 99 mg/dL    POCT Lactate, Arterial 0.9 0.4 - 2.0 mmol/L    POCT Base Excess, Arterial -0.9 -2.0 - 3.0 mmol/L    POCT HCO3 Calculated, Arterial 23.1 22.0 - 26.0 mmol/L    POCT Hemoglobin, Arterial 7.4 (L) 12.0 - 16.0 g/dL    POCT Anion Gap, Arterial 10 10 - 25 mmo/L    Patient Temperature 37.0 degrees Celsius    FiO2 21 %   Prepare RBC: 1 Units, Leukocytes Reduced (CMV reduced risk)   Result Value Ref Range    PRODUCT CODE V1899L92     Unit Number [id number]     Unit ABO O     Unit RH POS     XM INTEP COMP     Dispense Status IS     Blood Expiration Date April 20, 2024 23:59 EDT     PRODUCT BLOOD TYPE 5100     UNIT VOLUME 286    Type and Screen   Result Value Ref Range    ABO TYPE O     Rh TYPE POS     ANTIBODY SCREEN NEG    ECMO VENOUS FULL PANEL   Result Value Ref Range    POCT pH, Venous ECMO 7.26 Reference range not established pH    POCT pCO2, Venous ECMO 55 Reference range not established mm Hg    POCT pO2,  Venous  ECMO 42 Reference range  not established mm Hg    POCT SO2, Venous ECMO 78 Reference range not established %    POCT Oxy Hemoglobin, Venous ECMO 74.6 Reference range not established %    POCT Hematocrit Calculated, Venous ECMO 23.0 (L) 36.0 - 46.0 %    POCT Sodium, Venous ECMO 132 (L) 136 - 145 mmol/L    POCT Potassium, Venous ECMO 4.2 3.5 - 5.3 mmol/L    POCT Chloride, Venous ECMO 100 98 - 107 mmol/L    POCT Ionized Calcium, Venous ECMO 1.12 1.10 - 1.33 mmol/L    POCT Glucose, Venous ECMO 131 (H) 74 - 99 mg/dL    POCT Lactate Venous ECMO 0.7 0.4 - 2.0 mmol/L    POCT Base Excess, Venous ECMO -2.4 Reference range not established mmol/L    POCT HCO3 Calculated, Venous ECMO 24.7 Reference range not established mmol/L    POCT Hemoglobin, Venous ECMO 7.6 (L) 12.0 - 16.0 g/dL    POCT Anion Gap, Venous ECMO 12 Reference range not established mmo/L    Patient Temperature 37.0 degrees Celsius    FiO2 90 %   Blood Gas Arterial Full Panel   Result Value Ref Range    POCT pH, Arterial 7.40 7.38 - 7.42 pH    POCT pCO2, Arterial 34 (L) 38 - 42 mm Hg    POCT pO2, Arterial 148 (H) 85 - 95 mm Hg    POCT SO2, Arterial 100 94 - 100 %    POCT Oxy Hemoglobin, Arterial 96.3 94.0 - 98.0 %    POCT Hematocrit Calculated, Arterial 24.0 (L) 36.0 - 46.0 %    POCT Sodium, Arterial 132 (L) 136 - 145 mmol/L    POCT Potassium, Arterial 4.0 3.5 - 5.3 mmol/L    POCT Chloride, Arterial 102 98 - 107 mmol/L    POCT Ionized Calcium, Arterial 1.05 (L) 1.10 - 1.33 mmol/L    POCT Glucose, Arterial 134 (H) 74 - 99 mg/dL    POCT Lactate, Arterial 1.1 0.4 - 2.0 mmol/L    POCT Base Excess, Arterial -3.3 (L) -2.0 - 3.0 mmol/L    POCT HCO3 Calculated, Arterial 21.1 (L) 22.0 - 26.0 mmol/L    POCT Hemoglobin, Arterial 7.9 (L) 12.0 - 16.0 g/dL    POCT Anion Gap, Arterial 13 10 - 25 mmo/L    Patient Temperature 37.0 degrees Celsius    FiO2 90 %     *Note: Due to a large number of results and/or encounters for the requested time period, some results have not been displayed. A complete set of  results can be found in Results Review.        Malnutrition Diagnosis Status: Ongoing  Malnutrition Diagnosis: Moderate malnutrition related to acute disease or injury  As Evidenced by: pt's reported intake likely meeting <75% of estimated needs for at least 7 days, previous 5% weight loss in 1 month, LOS 42.  I agree with the dietitian's malnutrition diagnosis.      Assessment/Plan   Ms. Yimi Bowles is a 34F with a PMHx sig for stage D HFrEF (PPCM) s/p ICD s/p CardioMEMs, hypothyroidism 2/2 thyroidectomy, obesity s/p gastric bypass (2017), and SLE who is s/p OHT (3/31/2022) with a post-OHT course complicated by RIJ/RUE DVTs, leukopenia, left groin seroma, and asymptomatic 2R ACR (11/2022) treated with steroids, s/p total hysterectomy (2023), Flu A (1/2024).     Originally presented to [facility] ED on 2/15/24 with complaints of N/V x 3 days and inability to keep medications down. Found to have acute systolic heart failure with primary graft failure and cardiogenic shock. Treated for suspected acute cellular vs. acute antibody mediated rejection; however, multiple cardiac biopsies negative for pathology indicating rejection or other causes of graft failure. Course has been complicated by multiple MCS devices for refractory cardiogenic shock (right femoral IABP: 2/18/24 - 3/1/24, Left femoral VA ECMO: 2/19/24 - 2/29/24, Right axillary impella 5.5: 3/1/24 - remains in place, 2nd right femoral VA ECMO: 3/27/24 - remains in place), ARF requiring RRT, acute liver injury, intubation due to hypoxic respiratory failure, severe hemolysis 2/2 to impella malposition, mild CMV viremia, bilateral provoked IJ DVTs, multiple episodes of epistaxis & coagulopathies requiring ongoing blood product transfusions.       Transferred to CTICU on 3/27/24 following right femoral VA ECMO placement due to worsening cardiogenic shock and multi-organ failure despite Impella 5.5 support and multiple inotropes. Now awaiting suitable organ donation as  UNOS status 1 for heart-kidney transplantation (listed 4/3/24).      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  #Acute renal failure requiring RRT   #Acute transaminitis  #Epistaxis   #Thrombocytopenia   #Anemia   #Low grade CMV Viremia  #Provoked bilateral IJ DVTs    #Left SFA occlusion      Donor/Recipient Infectious history:  CMV: -/+ (low grade CMV viremia w/ levels < 35 on 3/1/24, repeat CMV levels remain negative since 3/27/24)  Toxo: -/-   Hep C: -/-     Rejection/Prophylaxis (transplants):  - Steroids: Continue 10mg PO prednisone daily  - Tacrolimus: 3mg PO @ 0630 and 3mg PO @ 1830 w/ daily levels drawn @ 0600  - Tacrolimus goal troughs: 8-10  - Myfortic acid: 360mg BID    - Antifungals: nystatin 500,000units Q6  - Antivirals: valcyte 450mg Q48hrs   - Anti PCP & Toxoplasmosis: holding Bactrim SS daily due to thrombocytopenia     Last cardiac biopsy: 2/16/24 with ACR grade 1R and no AMR (extremely low C4d+ detected), 2/20/24 negative for ACR and AMR  Last HLA: 4/2/24 negative for class 1 or 2 antibodies   Last RHC: closing numbers on 3/16/24 /86(97) RA 20, PAP 40/33(37), C.O./C.I. 5.5/2.8, PVR 88, SVR 1115   Last LHC: 2/18/24 negative for CAV and CAD   Last TTE/BOB: 3/27/24 shows LVEF 10-15% w/ global hypokinesis, severe RV dysfunction, mild-mod MR, trace TR and impella angled posteriorly   Osteopenia/osteoporosis prophylaxis: Vitamin D3 currently held. Continue calcium supplements  Peptic/gastric ulcer prophylaxis: Pantoprazole 40mg daily  CAV Prophylaxis: Holding Aspirin 81mg due to continued thrombocytopenia. Continue pravastatin daily.      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 Broad differential including SLE flair, giant cell vasculitis, CAV/CAD, viral cardiomyopathy, sarcoidosis, amyloidosis and allograft rejection amongst many others. 2xallograft biopsies on 2/16 & 2/20 remain negative for any  significant pathology. Notably, both biopsies taken after rejection therapies implemented which may have reduced areas of graft damage.    - DSAs remain negative; however, patient may have non-HLA antibodies present. 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   - Negative CAV & CAD via LHC on 2/18/24   - Completed methylpred steroid pulse w/ 1g Q24 x 3 days (2/16-2/18) and prednisone taper   - Thymoglobulin doses: 2/18 & 2/19   - IVIG + PLEX Session: 2/18, 2/20, 3/7, 3/11 and 3/13    - Right femoral VA ECMO flowing 3.5L w/ FIO2 100% and sweep 0.5  - Right axillary impella for LV venting remains in place and set P2 w/ flows of 1.2  - Extubated 4/1/24 to NC   - LFTs improving s/p ECMO cannulation with improving hemolytic labwork s/p impella wean    - Concerns for surgical intervention remain, including: deconditioning, multiorgan failure, lack of diagnosis causing graft failure, possible rejection despite appropriate immunotherapy, and optimal immunotherapy plan if re-transplanted.     Transplant Status:  - Status 1 for heart-kidney (listed in UNOS on 4/2/24)  - ABO status: O+  - CMV+/EBV+/Toxo-/Hep B-/Hep C-  - Prospective cross match with every donor offer  - Due to potential risk of hyperacute allograft rejection, induction plan will be 500mg solumedrol x 2 (followed by steroid taper) and thymoglobulin     Recommendations:  - Maintain impella P2 for LV venting   - Agree with negative fluid status w/ goal 500-1L negative in 24hrs   - Agree with deescalating antibiotics  - Agree with increasing systemic heparin for MCS/DVTs and SFA anticoagulation   - Please avoid blood product transfusions if possible     Continue excellent care per CTICU team.      Patient was examined and discussed with Advanced Heart Failure and Transplant Cardiology attending physician, Dr. Kendrick Jain, APRN-CNP

## 2024-04-03 NOTE — PROGRESS NOTES
CTICU Progress Note  Charla Bowles/[id number]    Admit Date: 2/15/2024  Hospital Length of Stay: 48   ICU Length of Stay: 6d 12h   CT SURGEON: Dr. Bright    SUBJECTIVE:   Remains on VA ECMO ~ 3.5L/SW 0.5/100% and Impella 5.5 P3 1L.  Remains on Room air.   CVVH; net -1.3 L.     MEDICATIONS  Infusions:  dexmedeTOMIDine, Last Rate: 0.2 mcg/kg/hr (04/03/24 0700)  heparin, Last Rate: 200 Units/hr (04/03/24 0700)  lactated Ringer's, Last Rate: 5 mL/hr (04/03/24 0700)  PrismaSol 4/2.5  sodium bicarbonate infusion Impella Purge 25 mEq/1000 mL D5W, Last Rate: 10 mL/hr (04/03/24 0700)      Scheduled:  acetaminophen, 650 mg, q6h  calcium carbonate-vitamin D3, 1 tablet, Daily  cyanocobalamin, 500 mcg, Daily  darbepoetin floyd, 100 mcg, q14 days  ferrous sulfate (325 mg ferrous sulfate), 65 mg of iron, Daily with breakfast  folic acid, 1 mg, Daily  insulin lispro, 0-15 Units, Before meals & nightly  levothyroxine, 200 mcg, Daily  lidocaine, 1 patch, Daily  melatonin, 5 mg, Nightly  melatonin, 5 mg, Daily  multivitamin with minerals, 1 tablet, Daily  mycophenolate, 360 mg, BID  nystatin, 5 mL, q6h  oxygen, , Continuous - Inhalation  oxymetazoline, 2 spray, q12h  pantoprazole, 40 mg, Daily  piperacillin-tazobactam, 4.5 g, q6h  predniSONE, 10 mg, Daily  psyllium, 1 packet, Daily  rosuvastatin, 40 mg, Nightly  sennosides-docusate sodium, 1 tablet, BID  sod phos di, mono-K phos mono, 500 mg, BID  sodium chloride, 1 spray, 4x daily  [Held by provider] sulfamethoxazole-trimethoprim, 80 mg of trimethoprim, Daily  tacrolimus, 3 mg, q12h TRICIA  traZODone, 25 mg, Nightly  valGANciclovir, 450 mg, q48h      PRN:  bisacodyl, 10 mg, Daily PRN  calcium gluconate, 1 g, q6h PRN  calcium gluconate, 2 g, q6h PRN  dextrose, 25 g, q15 min PRN  dextrose, 25 g, q15 min PRN   Or  glucagon, 1 mg, q15 min PRN  hydrALAZINE, 5 mg, q4h PRN  HYDROmorphone, 0.2 mg, q4h PRN  artificial tears, 2 drop, PRN  magnesium sulfate, 2 g, q6h PRN  magnesium sulfate,  "4 g, q6h PRN  microfibrllar collagen, , PRN  mupirocin, , BID PRN  naloxone, 0.2 mg, q5 min PRN  ondansetron, 4 mg, q4h PRN  oxyCODONE, 5 mg, q4h PRN  sodium chloride, 1 spray, 4x daily PRN        PHYSICAL EXAM:   Visit Vitals  BP 96/86 Comment: Post: 109/81   Pulse 91   Temp 37 °C (98.6 °F)   Resp (!) 34   Ht 1.549 m (5' 0.98\")   Wt 90.3 kg (199 lb 1.2 oz)   SpO2 100%   BMI 37.63 kg/m²   OB Status Hysterectomy   Smoking Status Never   BSA 1.97 m²     Wt Readings from Last 5 Encounters:   04/01/24 90.3 kg (199 lb 1.2 oz)   12/07/23 92.1 kg (203 lb)   12/01/23 93 kg (205 lb)   11/29/23 92.9 kg (204 lb 12.8 oz)   11/09/23 91.3 kg (201 lb 3.2 oz)     INTAKE/OUTPUT:  I/O last 3 completed shifts:  In: 1777.2 (19.7 mL/kg) [I.V.:627.2 (6.9 mL/kg); IV Piggyback:1150]  Out: 3830 (42.4 mL/kg) [Other:3830]  Weight: 90.3 kg          Vent settings:       Physical Exam:   - CONSTITUTION: Young appearing female intubated on ECMO with impella  - NEUROLOGIC: CAM negative. Following in all extremities. Alert and oriented.   - CARDIOVASCULAR: ST, R fem VA ECMO, no bleeding at site. R axillary impella without bleeding. Monophasic signals in LE  - RESPIRATORY: Room air. Course breath sounds.   - GI: Soft, non-tender,  active BS.  - : Anuric, on CVVH  - EXTREMITIES: Warm and dry. Edema improving  - SKIN: Left groin area of breakdown  - PSYCHIATRIC: Calm and cooperative.     Daily Risk Screen  Central line: access  Singh: n/s    Images: Reviewed.     Invasive Hemodynamics:    Most Recent Range Past 24hrs   BP (Art) 97/81 Arterial Line BP 1  Min: 73/63  Max: 119/86   MAP(Art) 86 mmHg Arterial Line MAP 1 (mmHg)   Min: 67 mmHg  Max: 194 mmHg   RA/CVP   No data recorded   PA 41/34 No data recorded   PA(mean) 37 mmHg No data recorded   CO 5.5 L/min No data recorded   CI 2.8 L/min/m2 No data recorded   Mixed Venous 54 % SVO2 (%)  Min: 54 %  Max: 79 %   SVR  1115 (dyne*sec)/cm5 No data recorded     Impella:      Most Recent Range Past 24hrs "   Performance Level 3 P Level  Min: 3   Min taken time: 04/03/24 0700  Max: 4   Max taken time: 04/02/24 1000   Flow (L/min) 1 Flow (L/min)  Min: 0.8   Min taken time: 04/03/24 0200  Max: 1.9   Max taken time: 04/02/24 1800   Motor Current 169/119 Motor Current  Min: 168/119   Min taken time: 04/03/24 0500  Max: 242/167   Max taken time: 04/02/24 1800   Placement Signal Yes  Placement OK could not be evaluated. This SmartLink does not work with rows of the type: Custom List   Purge (mmHg) 556 Purge Pressure (mmHg)  Min: 403   Min taken time: 04/03/24 0500  Max: 601   Max taken time: 04/02/24 1300   Purge rate (mL/hr) 12 Purge Rate (mL/hr)  Min: 11   Min taken time: 04/02/24 1600  Max: 12   Max taken time: 04/03/24 0700      ECMO:     Most Recent Range Past 24hrs   Flow 3.7 Patient Flow (L/min)  Min: 3.39  Max: 3.76   Speed 3190 Circuit Flow (RPM)  Min: 3164  Max: 3190   Sweep 0.5 Sweep Gas Flow Rate (L/min)  Min: 0.5  Max: 0.5          Assessment/Plan   34F with a PMHx sig for stage D HFrEF (PPCM) s/p ICD s/p CardioMEMs, hypothyroidism 2/2 thyroidectomy, obesity s/p gastric bypass (2017), and SLE who is s/p OHT (3/31/2022) with a post-OHT course complicated by RIJ/RUE DVTs, leukopenia, left groin seroma, and asymptomatic 2R ACR (11/2022) treated with steroids, s/p total hysterectomy (2023), Flu A (1/2024).     Originally presented to [facility] ED on 2/15/24 with complaints of N/V x 3 days and inability to keep medications down. Found to have acute systolic heart failure with primary graft failure and cardiogenic shock. Treated for suspected acute cellular vs. acute antibody mediated rejection; however, multiple cardiac biopsies negative for signs of rejection or other causes of graft failure. Course has been complicated by multiple MCS devices for refractory cardiogenic shock (right femoral IABP: 2/18/24 - 3/1/24, Left femoral VA ECMO: 2/19/24 - 2/29/24, Right axillary impella 5.5: 3/1/24 - remains in place, 2nd right  femoral VA ECMO: 3/27/24 - remains in place), ARF requiring RRT, acute liver injury, intubation due to volume overload in setting of pulmonary edema, severe hemolysis 2/2 to impella malposition, mild CMV viremia, bilateral provoked IJ DVTs, multiple episodes of epistaxis & coagulopathies requiring ongoing blood product transfusions.        Transferred to CTICU on 3/27/24 following right femoral VA ECMO placement due to worsening cardiogenic shock and multi-organ failure despite Impella 5.5 support and multiple inotropes.       Plan:  NEURO: No history. Previously neuro intact with acute pain and ongoing insomnia, received trazodone and Seroquel overnight without improvement. Cam negative, Alert and oriented. Sat on edge of bed with PT yesterday.  ->  - Serial neuro and pain assessments  - continue tylenol scheduled but will stop again if LFT uptrend.  - continue lidoderm patch for back pain  - PRN oxy 5mg Q4    - PRN dilaudid 0.2 mg q4hr   - Continue melatonin 10 mg HS (5 mg @ 1800, 5 mg @ 2100)  - D/C trazodone, Start Seroquel 50 mg HS   - PT/OT Consult; Goal sit to stand today  - CAM ICU score qshift. Sleep/wake cycle hygiene     ENT: Multiple episodes of epistaxis with interventions by ENT.  Most recently in OR 3/27 with L nare packed.  Packing removed yesterday, 4/1.   - appreciate ENT recs  - PRN ocean spray and mupiricin for dry nasal passages  - PRN afrin      Cardiac: Patient has a history of heart transplant in March of 2022 with primary graft dysfunction treated for acute cellular and antibody rejection without improvement with negative biopsy with multiple MCS devices for refractory cardiogenic shock (right femoral IABP: 2/18/24 - 3/1/24, Left femoral VA ECMO: 2/19/24 - 2/29/24, Right axillary impella 5.5: 3/1/24 - remains in place, 2nd right femoral VA ECMO: 3/27/24 - remains in place). Elevated LDH improved today on P4. Multiple attempts at repositioning impella previously.  ECHO 3/29 with impella  potentially deep but not adjusted again. ECMO ~3.7L flow/100%/sweep 0.5, impella 5.5 P3 with flows ~1L with resolved lactate and improving end organ function, tolerating weaning with downtrending LDH. ST 120s.   -->  - >Listed as status 1  - Maintain goal MAP 70-90  - continue full BiV support with ECMO.   - Wean Impella to P2  - Resume home rosuvastatin 40mg daily  - continue as needed hydralazine for MAP >90  - trend daily LDH   - Hold home amlodipine    Vascular: 3/27 arterial duplex with proximal SFA occlusin with collateral flow. C/f LLE ischemia (previous ECMO site) with loss of pulses- now monophasic with CK that had been normal and no longer trending. Feet warm with intact motor exam.   - appreciate vascular surgery following  - Vascular Surgery following for L SFA- No intervention needed at this time     PULM: No history. Intubated multiple times throughout hospital course for procedures; intubated 3/27 for OR. Improving acute respiratory failure persisting due to acute pulmonary edema which is now improving after aggressive volume removal. Appropriate peak and plateau pressures on ACVC with peep 10.  Tolerated pressure support trial 3/30 but apneic repeatedly 3/31.  Room air with gradual improvement of pulmonary edema on CXR  -->  - CXR daily  - wean sweep  for normal pH  - Maintain SPO2 > 92%     GI: History of gastric bypass and MMF colitis. Shock liver originally resolved; most recently elevated r/t likely congestive hepatopathy that is improving on ECMO. Abdominal pain improved with reduced myfortic dosing. Previous c/f GI bleed, likely blood from epistaxis; no current evidence of GI bleeding. H pylori negative, fecal calprotectin (elevated at 380), fecal lactoferrin (Positive 03/23/24). Having Daily BMs.  -->  - Continue calcitriol 0.5mg daily, multivitamin, calcium carbonate 1,250mg BID  - continue PPI  - avoiding placing dobhoff with epistaxis.   - Continue Diet- Carb controlled, Cardiac  - Start  Ensure Clear TID  - continue miralax BID & senna-colace BID - refusing BR    : No history, baseline Cr 1.2-1.3. HIRAM originally on CVVH then with renal recovery but then again worsened and now dialysis dependent and failed diuretic challenges. Hypervolemia improved with aggressive volume removal. Persisting hypophosphatemia despite repeated supplementation. Last 24 hours -1.3L. -->  - RFP as clinically indicated, replete electrolytes per CTICU protocol.   - D/C CVVH, Start SLED tonight with -500 ml removal  - Increase Sodium Phos 500 mg 4x daily  - Nephrology Following     ENDO: History of hypothyroidism and prediabetes. TSH elevated originally to malabsorption then with dosing adjusted by endo; TSH (3/17): 20.74, T4 1.11, T3: 1.4. Steroid induced hyperglycemia acceptable glycemic control on SSI.  -->  - Maintain BG <180 with hypoglycemia protocol  - Continue SSI #1 AC/HS   - Continue Synthroid 200mcg daily.   - Endocrine following, will touch base today if any further monitoring needed     HEME: History of iron deficiency anemia and remote DVT; again with acute DVT LIJ and SVT left cephalic vein and right cephalic vein. Acute blood loss anemia with repeated transfusions with significant hemolysis but no evidence of active bleeding; last received pRBC 3/30. Thrombocytopenia persisting and again downtrending; last PF4 negative 3/30.  No epistaxis today.  -->    - Continue ferrous sulfate daily  - holding ASA for CAD with thrombocytopenia  - continue bicarb purge  - Increase systemic heparin to 300 units/hr and evaluate response. High risk with DVT and SFA occlusion but high risk for bleeding.   - SCDs and for DVT prophylaxis  - Aranesp every 2 weeks  - Last type and screen: 4/3  - Avoid unnecessarily transfusing, HGB > 7.0     Rejection/prophylaxis: S/p heart transplant 3/31/2022. Induction basiliximab. Donor HCV -, toxo -/-, CMV -/+. Mild ACR with +CD4 and negative HLAs however clinical presentation concern for  acute cellular vs AMR rejection/stuttering rejection completed courses of thymo/PLEX/IVIG without clinical improvement.   - Steroids: Continue PO prednisone 10 mg daily  - Tacrolimus: 3.0 mg BID w/ daily levels drawn @ 0600  - Tacrolimus goal troughs: 8-10  - Increase Myfortic acid: 360mg BID.  Not starting MMF d/t hx of colitis  - Antifungals: nystatin 500,000units Q6  - Antivirals: valcyte 450mg Q48hrs   - Anti PCP & Toxoplasmosis: holding Bactrim SS daily due to thrombocytopenia     ID: Afebrile. C/f previous yeast infection. BC 3/27 NGTD final. MRSA screen negative 3/28. Episode of hypotension on 4/1, pan cultured and empiric Vanco/Zosyn started.    -->  - Trend temp q4h  - D/C  zosyn (4/1- )     Skin: Left groin wound from previous ECMO with wound consulted. Wound cx with NG 3/15.   - Preventative Mepilex dressings in place on sacrum and heels  - Change preventative Mepilex weekly or more frequently as indicated (when moist/soiled)   - Every shift skin assessment per nursing and weekly ICU skin rounds  - Moisture barrier to be applied with christopher care  - Active skin problems addressed with nursing on daily rounds  - wound recs from note 3/15 ordered      Proph:  SCDs  low systemic heparin  PPI     G: Lines  RIJ Trialysis - 3/5. Limited options for replacement with DVTs.    R radial maxwell 3/27  PIV x2     Restraints: Not indicated     Code status: Full Code     I personally spent 45 minutes of critical care time directly and personally managing the patient exclusive of separately billable procedures.     A,B,C,D,E,F,G: reviewed  Discussed with Dr. Lee.       Dispo: CTICU care for now.    CTICU TEAM PHONE [id number]

## 2024-04-03 NOTE — PROGRESS NOTES
"        INPATIENT TRANSPLANT NEPHROLOGY PROGRESS NOTE          REASON FOR CONSULT:  Immunosuppressive medication management and nephrology related issues.    SUBJECTION:  CVVH Procedure note    ECMO  On pressors  Tolerating CVVH well. No acute events overnight.     Remains on room air. Remains on ECMO.     PHYCISCAL EXAMINATION:    Visit Vitals  BP 96/86 Comment: Post: 109/81   Pulse (!) 120   Temp 36.4 °C (97.5 °F) (Temporal)   Resp (!) 54   Ht 1.549 m (5' 0.98\")   Wt 90.3 kg (199 lb 1.2 oz)   SpO2 100%   BMI 37.63 kg/m²   OB Status Hysterectomy   Smoking Status Never   BSA 1.97 m²        04/01 1900 - 04/03 0659  In: 1777.2 [I.V.:627.2]  Out: 3830      Weight change:     Constitutional:       General: She is not in acute distress.  Pulmonary:      Effort: Pulmonary effort is normal. No respiratory distress.      Breath sounds: Normal breath sounds.   Chest:      Comments: Right axillary impella site CDI   Abdominal:      General: Bowel sounds are normal.      Palpations: Abdomen is soft.      Tenderness: There is no abdominal tenderness.   Musculoskeletal:      -No LE edema     General: Skin is warm and dry.      Comments: Left groin ECMO cannulation site with drainage    Neurological:      General: No focal deficit present.      Mental Status: She is alert and oriented to person, place, and time.   Psychiatric:         Behavior: Behavior normal. Behavior is cooperative.     MEDICATION LIST: REVIEWED    acetaminophen, 650 mg, q6h  calcium carbonate-vitamin D3, 1 tablet, Daily  cyanocobalamin, 500 mcg, Daily  darbepoetin floyd, 100 mcg, q14 days  ferrous sulfate (325 mg ferrous sulfate), 65 mg of iron, Daily with breakfast  folic acid, 1 mg, Daily  insulin lispro, 0-5 Units, TID with meals  levothyroxine, 200 mcg, Daily  lidocaine, 1 patch, Daily  melatonin, 10 mg, Daily  multivitamin with minerals, 1 tablet, Daily  mycophenolate, 360 mg, BID  nystatin, 5 mL, q6h  oxygen, , Continuous - Inhalation  oxymetazoline, 2 " spray, q12h  oxymetazoline, 2 spray, q12h  pantoprazole, 40 mg, Daily  predniSONE, 10 mg, Daily  psyllium, 1 packet, Daily  QUEtiapine, 50 mg, Nightly  rosuvastatin, 40 mg, Nightly  sennosides-docusate sodium, 1 tablet, BID  sod phos di, mono-K phos mono, 500 mg, 4x daily  sodium chloride, 1 spray, 4x daily  [Held by provider] sulfamethoxazole-trimethoprim, 80 mg of trimethoprim, Daily  tacrolimus, 3 mg, q12h TRICIA  valGANciclovir, 450 mg, q48h      [Held by provider] dexmedeTOMIDine, Last Rate: Stopped (04/03/24 0825)  heparin, Last Rate: 300 Units/hr (04/03/24 1100)  lactated Ringer's, Last Rate: 5 mL/hr (04/03/24 1100)  sodium bicarbonate infusion Impella Purge 25 mEq/1000 mL D5W, Last Rate: 10 mL/hr (04/03/24 1100)      bisacodyl, 10 mg, Daily PRN  calcium gluconate, 1 g, q6h PRN  calcium gluconate, 2 g, q6h PRN  dextrose, 25 g, q15 min PRN  dextrose, 25 g, q15 min PRN   Or  glucagon, 1 mg, q15 min PRN  hydrALAZINE, 5 mg, q4h PRN  HYDROmorphone, 0.2 mg, q4h PRN  artificial tears, 2 drop, PRN  magnesium sulfate, 2 g, q6h PRN  magnesium sulfate, 4 g, q6h PRN  microfibrllar collagen, , PRN  mupirocin, , BID PRN  naloxone, 0.2 mg, q5 min PRN  ondansetron, 4 mg, q4h PRN  oxyCODONE, 5 mg, q4h PRN  sodium chloride, 1 spray, 4x daily PRN        ALLERGY:  Allergies   Allergen Reactions    Dapagliflozin GI bleeding and Bleeding     UTI    Urinary tract infection    Empagliflozin Unknown    Myfortic [Mycophenolate Sodium] GI Upset    Topiramate Nausea Only and Nausea/vomiting    Latex Rash       LABS:  Results for orders placed or performed during the hospital encounter of 02/15/24 (from the past 24 hour(s))   POCT GLUCOSE   Result Value Ref Range    POCT Glucose 145 (H) 74 - 99 mg/dL   Calcium, Ionized   Result Value Ref Range    POCT Calcium, Ionized 1.00 (L) 1.1 - 1.33 mmol/L   CBC   Result Value Ref Range    WBC 5.6 4.4 - 11.3 x10*3/uL    nRBC 3.1 (H) 0.0 - 0.0 /100 WBCs    RBC 2.72 (L) 4.00 - 5.20 x10*6/uL    Hemoglobin  7.7 (L) 12.0 - 16.0 g/dL    Hematocrit 24.0 (L) 36.0 - 46.0 %    MCV 88 80 - 100 fL    MCH 28.3 26.0 - 34.0 pg    MCHC 32.1 32.0 - 36.0 g/dL    RDW 23.5 (H) 11.5 - 14.5 %    Platelets 60 (L) 150 - 450 x10*3/uL   Renal Function Panel   Result Value Ref Range    Glucose 141 (H) 74 - 99 mg/dL    Sodium 134 (L) 136 - 145 mmol/L    Potassium 4.5 3.5 - 5.3 mmol/L    Chloride 101 98 - 107 mmol/L    Bicarbonate 22 21 - 32 mmol/L    Anion Gap 16 10 - 20 mmol/L    Urea Nitrogen 11 6 - 23 mg/dL    Creatinine 0.79 0.50 - 1.05 mg/dL    eGFR >90 >60 mL/min/1.73m*2    Calcium 7.9 (L) 8.6 - 10.6 mg/dL    Phosphorus 2.7 2.5 - 4.9 mg/dL    Albumin 4.4 3.4 - 5.0 g/dL   Heparin Assay, UFH   Result Value Ref Range    Heparin Unfractionated 0.1 See Comment Below for Therapeutic Ranges IU/mL   POCT GLUCOSE   Result Value Ref Range    POCT Glucose 126 (H) 74 - 99 mg/dL   ECMO Arterial Blood Gas   Result Value Ref Range    POCT pH, Arterial ECMO 7.17 Reference range not established pH    POCT pCO2, Arterial ECMO 70 Reference range not established mm Hg    POCT pO2,  Arterial ECMO 455 Reference range not established mm Hg    POCT SO2, Arterial ECMO 100 Reference range not established %    POCT Oxy Hemoglobin, Arterial ECMO 97.0 Reference range not established %    POCT Base Excess, Arterial ECMO -3.2 Reference range not established mmol/L    POCT HCO3 Calculated, Arterial ECMO 25.5 Reference range not established mmol/L    Patient Temperature 37.0 degrees Celsius    FiO2 100 %   ECMO ARTERIAL FULL PANEL   Result Value Ref Range    POCT pH, Arterial ECMO 7.17 Reference range not established pH    POCT pCO2, Arterial ECMO 70 Reference range not established mm Hg    POCT pO2,  Arterial ECMO 455 Reference range not established mm Hg    POCT SO2, Arterial ECMO 100 Reference range not established %    POCT Oxy Hemoglobin, Arterial ECMO 97.0 Reference range not established %    POCT Hematocrit Calculated, Arterial ECMO 23.0 (L) 36.0 - 46.0 %     POCT Sodium, Arterial  ECMO 132 (L) 136 - 145 mmol/L    POCT Potassium, Arterial  ECMO 4.6 3.5 - 5.3 mmol/L    POCT Chloride, Arterial  ECMO 101 98 - 107 mmol/L    POCT Ionized Calcium, Arterial  ECMO 1.16 1.10 - 1.33 mmol/L    POCT Glucose, Arterial  ECMO 124 (H) 74 - 99 mg/dL    POCT Lactate Arterial  ECMO 0.6 0.4 - 2.0 mmol/L    POCT Base Excess, Arterial ECMO -3.2 Reference range not established mmol/L    POCT HCO3 Calculated, Arterial ECMO 25.5 Reference range not established mmol/L    POCT Hemoglobin, Arterial  ECMO 7.7 (L) 12.0 - 16.0 g/dL    POCT Anion Gap, Arterial  ECMO 10 Reference range not established mmo/L    Patient Temperature 37.0 degrees Celsius    FiO2 100 %   Tacrolimus level   Result Value Ref Range    Tacrolimus  4.1 <=15.0 ng/mL   Reticulocytes   Result Value Ref Range    Retic % 4.8 (H) 0.5 - 2.0 %    Retic Absolute 0.119 (H) 0.018 - 0.083 x10*6/uL    Reticulocyte Hemoglobin 30 28 - 38 pg    Immature Retic fraction 34.4 (H) <=16.0 %   Haptoglobin   Result Value Ref Range    Haptoglobin <10 (L) 37 - 246 mg/dL   CBC and Auto Differential   Result Value Ref Range    WBC 5.2 4.4 - 11.3 x10*3/uL    nRBC 3.8 (H) 0.0 - 0.0 /100 WBCs    RBC 2.50 (L) 4.00 - 5.20 x10*6/uL    Hemoglobin 6.9 (L) 12.0 - 16.0 g/dL    Hematocrit 22.3 (L) 36.0 - 46.0 %    MCV 89 80 - 100 fL    MCH 27.6 26.0 - 34.0 pg    MCHC 30.9 (L) 32.0 - 36.0 g/dL    RDW 23.4 (H) 11.5 - 14.5 %    Platelets 43 (L) 150 - 450 x10*3/uL    Immature Granulocytes %, Automated 9.2 (H) 0.0 - 0.9 %    Immature Granulocytes Absolute, Automated 0.48 0.00 - 0.70 x10*3/uL   ECMO Venous Blood Gas   Result Value Ref Range    POCT pH, Venous ECMO 7.19 Reference range not established pH    POCT pCO2, Venous ECMO 67 Reference range not established mm Hg    POCT pO2,  Venous  ECMO 380 Reference range not established mm Hg    POCT SO2, Venous ECMO 100 Reference range not established %    POCT Oxy Hemoglobin, Venous ECMO 96.1 Reference range not established %     POCT Base Excess, Venous ECMO -2.9 Reference range not established mmol/L    POCT HCO3 Calculated, Venous ECMO 25.6 Reference range not established mmol/L    Patient Temperature 37.0 degrees Celsius    FiO2 100 %   Lactate Dehydrogenase   Result Value Ref Range    LDH 1,036 (H) 84 - 246 U/L   Hepatic function panel   Result Value Ref Range    Albumin 4.2 3.4 - 5.0 g/dL    Bilirubin, Total 1.6 (H) 0.0 - 1.2 mg/dL    Bilirubin, Direct 0.8 (H) 0.0 - 0.3 mg/dL    Alkaline Phosphatase 157 (H) 33 - 110 U/L    ALT 18 7 - 45 U/L    AST 70 (H) 9 - 39 U/L    Total Protein 5.9 (L) 6.4 - 8.2 g/dL   Magnesium   Result Value Ref Range    Magnesium 2.19 1.60 - 2.40 mg/dL   ECMO VENOUS FULL PANEL   Result Value Ref Range    POCT pH, Venous ECMO 7.19 Reference range not established pH    POCT pCO2, Venous ECMO 67 Reference range not established mm Hg    POCT pO2,  Venous  ECMO 380 Reference range not established mm Hg    POCT SO2, Venous ECMO 100 Reference range not established %    POCT Oxy Hemoglobin, Venous ECMO 96.1 Reference range not established %    POCT Hematocrit Calculated, Venous ECMO 24.0 (L) 36.0 - 46.0 %    POCT Sodium, Venous ECMO 133 (L) 136 - 145 mmol/L    POCT Potassium, Venous ECMO 4.1 3.5 - 5.3 mmol/L    POCT Chloride, Venous ECMO 102 98 - 107 mmol/L    POCT Ionized Calcium, Venous ECMO 1.10 1.10 - 1.33 mmol/L    POCT Glucose, Venous ECMO 113 (H) 74 - 99 mg/dL    POCT Lactate Venous ECMO 0.9 0.4 - 2.0 mmol/L    POCT Base Excess, Venous ECMO -2.9 Reference range not established mmol/L    POCT HCO3 Calculated, Venous ECMO 25.6 Reference range not established mmol/L    POCT Hemoglobin, Venous ECMO 8.0 (L) 12.0 - 16.0 g/dL    POCT Anion Gap, Venous ECMO 10 Reference range not established mmo/L    Patient Temperature 37.0 degrees Celsius    FiO2 100 %   Renal Function Panel   Result Value Ref Range    Glucose 109 (H) 74 - 99 mg/dL    Sodium 135 (L) 136 - 145 mmol/L    Potassium 4.2 3.5 - 5.3 mmol/L    Chloride 100  98 - 107 mmol/L    Bicarbonate 22 21 - 32 mmol/L    Anion Gap 17 10 - 20 mmol/L    Urea Nitrogen 10 6 - 23 mg/dL    Creatinine 0.81 0.50 - 1.05 mg/dL    eGFR >90 >60 mL/min/1.73m*2    Calcium 7.6 (L) 8.6 - 10.6 mg/dL    Phosphorus 1.9 (L) 2.5 - 4.9 mg/dL    Albumin 4.2 3.4 - 5.0 g/dL   Manual Differential   Result Value Ref Range    Neutrophils %, Manual 81.9 40.0 - 80.0 %    Bands %, Manual 4.3 0.0 - 5.0 %    Lymphocytes %, Manual 6.0 13.0 - 44.0 %    Monocytes %, Manual 5.2 2.0 - 10.0 %    Eosinophils %, Manual 0.0 0.0 - 6.0 %    Basophils %, Manual 0.0 0.0 - 2.0 %    Metamyelocytes %, Manual 1.7 0.0 - 0.0 %    Myelocytes %, Manual 0.9 0.0 - 0.0 %    Seg Neutrophils Absolute, Manual 4.26 1.20 - 7.00 x10*3/uL    Bands Absolute, Manual 0.22 0.00 - 0.70 x10*3/uL    Lymphocytes Absolute, Manual 0.31 (L) 1.20 - 4.80 x10*3/uL    Monocytes Absolute, Manual 0.27 0.10 - 1.00 x10*3/uL    Eosinophils Absolute, Manual 0.00 0.00 - 0.70 x10*3/uL    Basophils Absolute, Manual 0.00 0.00 - 0.10 x10*3/uL    Metamyelocytes Absolute, Manual 0.09 0.00 - 0.00 x10*3/uL    Myelocytes Absolute, Manual 0.05 0.00 - 0.00 x10*3/uL    Total Cells Counted 116     Neutrophils Absolute, Manual 4.48 1.20 - 7.70 x10*3/uL    RBC Morphology See Below     RBC Fragments Few     Ovalocytes Few    Blood Gas Arterial Full Panel   Result Value Ref Range    POCT pH, Arterial 7.44 (H) 7.38 - 7.42 pH    POCT pCO2, Arterial 34 (L) 38 - 42 mm Hg    POCT pO2, Arterial 232 (H) 85 - 95 mm Hg    POCT SO2, Arterial 100 94 - 100 %    POCT Oxy Hemoglobin, Arterial 96.2 94.0 - 98.0 %    POCT Hematocrit Calculated, Arterial 22.0 (L) 36.0 - 46.0 %    POCT Sodium, Arterial 131 (L) 136 - 145 mmol/L    POCT Potassium, Arterial 4.2 3.5 - 5.3 mmol/L    POCT Chloride, Arterial 102 98 - 107 mmol/L    POCT Ionized Calcium, Arterial 1.01 (L) 1.10 - 1.33 mmol/L    POCT Glucose, Arterial 107 (H) 74 - 99 mg/dL    POCT Lactate, Arterial 0.9 0.4 - 2.0 mmol/L    POCT Base Excess,  Arterial -0.9 -2.0 - 3.0 mmol/L    POCT HCO3 Calculated, Arterial 23.1 22.0 - 26.0 mmol/L    POCT Hemoglobin, Arterial 7.4 (L) 12.0 - 16.0 g/dL    POCT Anion Gap, Arterial 10 10 - 25 mmo/L    Patient Temperature 37.0 degrees Celsius    FiO2 21 %   Prepare RBC: 1 Units, Leukocytes Reduced (CMV reduced risk)   Result Value Ref Range    PRODUCT CODE Q8998J24     Unit Number [id number]     Unit ABO O     Unit RH POS     XM INTEP COMP     Dispense Status IS     Blood Expiration Date April 20, 2024 23:59 EDT     PRODUCT BLOOD TYPE 5100     UNIT VOLUME 286    POCT GLUCOSE   Result Value Ref Range    POCT Glucose 130 (H) 74 - 99 mg/dL   Type and Screen   Result Value Ref Range    ABO TYPE O     Rh TYPE POS     ANTIBODY SCREEN NEG    ECMO VENOUS FULL PANEL   Result Value Ref Range    POCT pH, Venous ECMO 7.26 Reference range not established pH    POCT pCO2, Venous ECMO 55 Reference range not established mm Hg    POCT pO2,  Venous  ECMO 42 Reference range not established mm Hg    POCT SO2, Venous ECMO 78 Reference range not established %    POCT Oxy Hemoglobin, Venous ECMO 74.6 Reference range not established %    POCT Hematocrit Calculated, Venous ECMO 23.0 (L) 36.0 - 46.0 %    POCT Sodium, Venous ECMO 132 (L) 136 - 145 mmol/L    POCT Potassium, Venous ECMO 4.2 3.5 - 5.3 mmol/L    POCT Chloride, Venous ECMO 100 98 - 107 mmol/L    POCT Ionized Calcium, Venous ECMO 1.12 1.10 - 1.33 mmol/L    POCT Glucose, Venous ECMO 131 (H) 74 - 99 mg/dL    POCT Lactate Venous ECMO 0.7 0.4 - 2.0 mmol/L    POCT Base Excess, Venous ECMO -2.4 Reference range not established mmol/L    POCT HCO3 Calculated, Venous ECMO 24.7 Reference range not established mmol/L    POCT Hemoglobin, Venous ECMO 7.6 (L) 12.0 - 16.0 g/dL    POCT Anion Gap, Venous ECMO 12 Reference range not established mmo/L    Patient Temperature 37.0 degrees Celsius    FiO2 90 %   Blood Gas Arterial Full Panel   Result Value Ref Range    POCT pH, Arterial 7.40 7.38 - 7.42 pH     POCT pCO2, Arterial 34 (L) 38 - 42 mm Hg    POCT pO2, Arterial 148 (H) 85 - 95 mm Hg    POCT SO2, Arterial 100 94 - 100 %    POCT Oxy Hemoglobin, Arterial 96.3 94.0 - 98.0 %    POCT Hematocrit Calculated, Arterial 24.0 (L) 36.0 - 46.0 %    POCT Sodium, Arterial 132 (L) 136 - 145 mmol/L    POCT Potassium, Arterial 4.0 3.5 - 5.3 mmol/L    POCT Chloride, Arterial 102 98 - 107 mmol/L    POCT Ionized Calcium, Arterial 1.05 (L) 1.10 - 1.33 mmol/L    POCT Glucose, Arterial 134 (H) 74 - 99 mg/dL    POCT Lactate, Arterial 1.1 0.4 - 2.0 mmol/L    POCT Base Excess, Arterial -3.3 (L) -2.0 - 3.0 mmol/L    POCT HCO3 Calculated, Arterial 21.1 (L) 22.0 - 26.0 mmol/L    POCT Hemoglobin, Arterial 7.9 (L) 12.0 - 16.0 g/dL    POCT Anion Gap, Arterial 13 10 - 25 mmo/L    Patient Temperature 37.0 degrees Celsius    FiO2 90 %   POCT GLUCOSE   Result Value Ref Range    POCT Glucose 138 (H) 74 - 99 mg/dL     *Note: Due to a large number of results and/or encounters for the requested time period, some results have not been displayed. A complete set of results can be found in Results Review.        ASSESSMENT AND PLAN:    Ms. Yimi Bowles is a 34 y.o. female who underwent a kidney transplant surgery  on 3/31/2022 (Heart).    Principal Problem:    Cardiogenic shock (CMS/[facility])  Active Problems:    Heart transplant recipient (CMS/[facility])    ESRD (end stage renal disease) on dialysis (CMS/[facility])    HIRAM (acute kidney injury) (CMS/[facility])    Acute passive congestion of liver    Hyponatremia    Iron deficiency anemia    Abdominal pain    Cardiac transplant rejection (CMS/[facility])    Acute combined systolic (congestive) and diastolic (congestive) heart failure (CMS/[facility])      CRRT Management Note:  #HIRAM-D, 2/2 ATN in setting of cardiogenic shock. Started on CRRT initially 2/19 and transitioned to iHD however unable to achieve optimal fluid management so transitioned back to CRRT   - Hemodynamically improving with pt goal fluid removal -500ml/day so will  transition to SCUF and DC CVVH today. Plan for 8h SCUF  -strict I/O, daily RFP    Heart and Kidney transplant Candidacy :  Status 1 or heart kidney transplant - listed in [facility] on 4/2/24.  Pt met OPTN eligibility criteria for kidney transplant on 3/29/24 for sustained HIRAM over 6 weeks. Pre tx work up - per heart tx team.   Note:  GFR 2/17/24=23    [Sustained acute kidney injury : At least one of the following, or a combination of both of the following, for the last 6 weeks:    That the candidate has been on dialysis at least once every 7 days.    That the candidate has a measured or calculated CrCl or GFR less than or equal to 25 mL/min at least once every 7 days]        For questions, please contact transplant nephrology page x [phone]    Josue Gil, DO  PGY 4 Nephrology Fellow

## 2024-04-03 NOTE — PROGRESS NOTES
CTICU Attending Assessment Note    This is a 34yoF who is s/p cardiac transplant with concern for acute rejection of unclear etiology despite maximal therapy in cardiogenic shock requiring VA ECMO and Impella as well as acute renal failure requiring CRRT.    Neuro: Awake and nonfocal exam. CAM negative this am. Continue PO/topical multimodal pain therapy to adequately control pain. Continue CAM assessment and encourage restful sleep per ICU protocols.  Poor sleep overnight and continue melatonin. Patient does not like the way trazadone makes her feel and attempted a dose of Seroquel overnight. Continue PT/OT work.  HENT: Epistaxis has seemed to resolve. Continue ocean spray and mupuricin. Appreciate ENT recommendations.  CV: Currently on VA ECMO and R axillary Impella for support without additional vasoactive medication need. Continue Impella wean to P2 at 1.2L flow. Continued improving LDH. HF multidisciplinary discussion with plans to list for cardiac and renal transplantation. VA ECMO at 3190 rpm with 3.5 L flow.  Goal MAP 70-90 and CI >2.2. R VA ECMO configuration with DRC in place.  Pulm: Currently on RA. VA ECMO with 90% and 0.5 sweep.   Renal: Acute renal failure on CRRT.  Appreciate renal transplant team recommendations and will continue to plan for aim net negative fluid balance.   GI: Recent congestive hepatopathy that is improving with hx of colitis. Continued adequate BM and advance diet to goal.  Heme: Acute postoperative blood loss anemia with goal Hgb >7 and platelets >25. No indication for transfusion at this time despite hgb 6.9 as no signs of malperfusion and given attempt to minimize transfusion in prep for potential relisting. Bilateral DVT and SFA occlusion and will continue to uptitrate our low dose heparin today. HCO3 via Impella purge. Hemolysis with downtrending LDH this am.  Thrombocytopenia likely multifactorial without concern for HIT.  Endo: Hypothyroidisim and currently on  levothyroxine.  Appreciate endocrine recommendations.  Will continue SSI protocol with goal -180 per post-cardiotomy goal but hypoglycemia overnight but not tolerating PO and will continue to follow.  ID: Negative Cx to date and will stop Zosyn as no clear indication for infection. No other indications for antibiotics at this time. Immunosuppression plan per HF with tacrolimus, prednisone, nystatin, valacyclovir, myfortic per HF.   Skin/MSK: L groin wound site and continue to follow wound care recommendations.  Prophy: Continue SCD, PPI.  Maintain R IJ CVC as limited additional access options given current cannulation strategy, wounds, and known clots.   A-F Bundle reviewed and addressed as above with multidisciplinary rounds completed at bedside.  Dispo: Continue CTICU care.    Due to the high probability of life threatening clinical decompensation, the patient required critical care time evaluating and managing this patient.  Critical care time included obtaining a history, examining the patient, ordering and reviewing studies, discussing, developing, and implementing a management plan, evaluating the patient's response to treatment, and discussion with other care team providers. I saw and evaluated the patient myself. I reviewed the provider's documentation and discussed the patient with the provider. Critical care time was performed exclusive of billable procedures.    Critical Care Time: 39 minutes    Feliciano Lee MD  Emergency Critical Care Attending Physician

## 2024-04-03 NOTE — NURSING NOTE
Multidisciplinary ECMO Rounds Note    Attendance:  CT Surgeon: dr. Marina  CTICU Attending: Dr. Rosa Garcia  Palliative Care Attending: Dr. Chaudhary  Physical Therapy Present (Y/N): N  Perfusion Present (Y/N): Y    ECMO Indication: cardiogenic shock  ECMO Cannula Configuration: Right fem fem VA    Changes made to Hemodynamic/Ventilator/ECMO Management: none made, patient hs remained hemodynamically stable and doesn't require pressor or inotrope support at this time. Patient was extubated 4/1    Circuit Concerns: none  Bleeding Concerns: none  Clotting/Ischemia Concerns: none  Anticoagulation Plan/Monitoring: heparin gtt     Mobility Plan/Candidacy: sitting at the side of bed with PT the past two days. Patient has been very motivated sitting at SOB for at least ten minuets. Not able to hold herself up yet, but can kick legs up with own strength while sitting.  Nutrition: regular diet   Patient/Family Concerns: none  Nursing Concerns: none    Patients Minimally Acceptable Outcome: full recovery after being transplanted  Barriers to decannulation/anticipated decannulation date:patient is currently on transplant list for heart and kidney    ECMO:     Most Recent Range Past 24hrs   Flow 3.62 Patient Flow (L/min)  Min: 3.39  Max: 3.76   Speed 3190 Circuit Flow (RPM)  Min: 3186  Max: 3191   Sweep 0.5 Sweep Gas Flow Rate (L/min)  Min: 0.5  Max: 0.5       [Held by provider] dexmedeTOMIDine, 0.1-1.5 mcg/kg/hr, Last Rate: Stopped (04/03/24 0825)  heparin, 400 Units/hr, Last Rate: 300 Units/hr (04/03/24 1500)  lactated Ringer's, 5 mL/hr, Last Rate: 5 mL/hr (04/03/24 1500)  sodium bicarbonate infusion Impella Purge 25 mEq/1000 mL D5W, 10 mL/hr, Last Rate: 10 mL/hr (04/03/24 1500)

## 2024-04-03 NOTE — PROGRESS NOTES
Physical Therapy    Physical Therapy Treatment    Patient Name: Charla Bowles  MRN: [id number]  Today's Date: 4/3/2024  Time Calculation  Start Time: 1437  Stop Time: 1504  Time Calculation (min): 27 min       Assessment/Plan   PT Assessment  PT Assessment Results: Decreased strength, Decreased endurance, Impaired balance, Decreased mobility  Rehab Prognosis: Good  Evaluation/Treatment Tolerance: Patient tolerated treatment well  Medical Staff Made Aware: Yes  End of Session Communication: Bedside nurse, PCT/NA/CTA  End of Session Patient Position: Bed, 3 rail up, Alarm off, not on at start of session, Alarm off, caregiver present  PT Plan  Inpatient/Swing Bed or Outpatient: Inpatient  PT Plan  Treatment/Interventions: Bed mobility, Transfer training, Gait training, Balance training, Neuromuscular re-education, Strengthening, Endurance training, Therapeutic exercise, Therapeutic activity  PT Plan: Skilled PT  PT Frequency: 5 times per week  PT Discharge Recommendations: High intensity level of continued care  PT Recommended Transfer Status: Assist x2  PT - OK to Discharge: Yes      General Visit Information:   PT  Visit  PT Received On: 04/03/24  Response to Previous Treatment: Patient with no complaints from previous session.  General  Family/Caregiver Present: No  Co-Treatment: OT  Co-Treatment Reason: Pt on ECMO requiring skilled 2 person assist for safe mobility  Prior to Session Communication: Bedside nurse, PCT/NA/CTA  Patient Position Received: Bed, 3 rail up, Alarm off, not on at start of session  Preferred Learning Style: auditory, verbal  General Comment: Pt awake, alert and willing to participate in therapy session.  Pt with increased back pain throughout session.  Pt completed bed mobility, EOB sitting ~12 min and sit<>stand transfer.  Assistance provided with all functional mobility this date.  Vitals and ECMO numbers stable throughout session. (Impella: P-4, flow-1.5; L fem VA ECMO:  flow-3.55/3.59, 90% FiO2, 500 sweep; maxwell, teley, central line)    Subjective   Precautions:  Precautions  Medical Precautions: Cardiac precautions, Fall precautions  Precautions Comment: R axillary impella precautions- no pushing/pulling with R UE, No lifting R UE above shoulder height, no R UE humeral EXT past plane of body, R femoral ECMO precautions, MAP 70-90, protective precautions  Vital Signs:  Vital Signs  Heart Rate: (!) 121 (Max HR: 140;  Post: 128)  Heart Rate Source: Monitor  Resp: (!) 39 (Post: 17)  SpO2: 100 % (Post: 99)  BP: 103/84 (Post: 105/79)  MAP (mmHg): 91 (Post: 87)  BP Location: Right arm  BP Method: Arterial line  Patient Position: Lying    Objective   Pain:  Pain Assessment  Pain Assessment:  (Pt reported back pain throughout session.  RN provided pt with medication prior to session (waited 30 min to see pt after meds given).  Heat packs applied to back at end of session.  Unrated)  Cognition:  Cognition  Overall Cognitive Status: Within Functional Limits  Arousal/Alertness: Appropriate responses to stimuli  Orientation Level: Oriented X4  Following Commands: Follows one step commands without difficulty  Activity Tolerance:  Activity Tolerance  Endurance: Tolerates 10 - 20 min exercise with multiple rests  Early Mobility/Exercise Safety Screen: Proceed with mobilization - No exclusion criteria met  Activity Tolerance Comments: Fatigue at end of session  Treatments:  Therapeutic Activity  Therapeutic Activity Performed: Yes  Therapeutic Activity 1: Increased time for skilled ICU line/tube management for safe functional mobility.  RN watched ECMO cannulas and impella line.  CNP watched ECMO numbers with mobility.  Therapeutic Activity 2: Elevated HOB to ~45 degrees prior to OOB transfer to asses ECMO numbers and pt tolerance.  Therapeutic Activity 3: Pt sat EOB ~12 min with CGA-minAx1. Pt demonstrating good seated posture with no instability. Pt with increased back pain, OT assisting with  back stretches when sitting EOB. Vitals and ECMO numbers stable throughout.  Therapeutic Activity 4: Static standing with B arm in arm, minAx1 ~45 sec.    Bed Mobility  Bed Mobility: Yes  Bed Mobility 1  Bed Mobility 1: Supine to sitting  Level of Assistance 1: Maximum assistance (x2)  Bed Mobility Comments 1: HOB elevated, draw sheet utilized, +1 assist for management of ECMO amd impella  Bed Mobility 2  Bed Mobility  2: Sitting to supine  Level of Assistance 2: Dependent (x2)  Bed Mobility Comments 2: HOB slightly elevated, draw sheet utilized  Bed Mobility 3  Bed Mobility 3: Rolling right, Rolling left  Level of Assistance 3: Dependent (x1; +1 ECMO/impella management)  Bed Mobility 4  Bed Mobility 4:  (Boost towards HOB)  Level of Assistance 4: Dependent (x2; +1 for ECMO/impella management)  Bed Mobility Comments 4: HOB flat, draw sheet utilized    Ambulation/Gait Training  Ambulation/Gait Training Performed: No  Transfers  Transfer: Yes  Transfer 1  Transfer From 1: Sit to  Transfer to 1: Stand  Technique 1: Sit to stand  Transfer Device 1:  (B arm in arm)  Transfer Level of Assistance 1: Moderate assistance (x2; +1 ECMO/impella management)  Trials/Comments 1: Increased time to achieve full stand.  Cues to engage glutes and lift trunk upright.  Transfers 2  Transfer From 2: Stand to  Transfer to 2: Sit  Technique 2: Stand to sit  Transfer Device 2:  (B arm in arm)  Transfer Level of Assistance 2: Moderate assistance (x2; +1 ECMO/impella management)  Trials/Comments 2: Poor eccentric control    Outcome Measures:  [facility] Basic Mobility  Turning from your back to your side while in a flat bed without using bedrails: A lot  Moving from lying on your back to sitting on the side of a flat bed without using bedrails: A lot  Moving to and from bed to chair (including a wheelchair): A lot  Standing up from a chair using your arms (e.g. wheelchair or bedside chair): A lot  To walk in hospital room: A lot  Climbing 3-5  steps with railing: Total  Basic Mobility - Total Score: 11    FSS-ICU  Ambulation: Unable to attempt due to weakness  Rolling: Maximal assistance (performs 25% - 49% of task)  Sitting: Minimal assistance (performs 75% or more of task)  Transfer Sit-to-Stand: Maximal assistance (performs 25% - 49% of task)  Transfer Supine-to-Sit: Maximal assistance (performs 25% - 49% of task)  Total Score: 10    Education Documentation  Precautions, taught by Michelle Lee PT at 4/3/2024  3:58 PM.  Learner: Patient  Readiness: Acceptance  Method: Explanation  Response: Verbalizes Understanding, Demonstrated Understanding    Body Mechanics, taught by Michelle Lee PT at 4/3/2024  3:58 PM.  Learner: Patient  Readiness: Acceptance  Method: Explanation  Response: Verbalizes Understanding, Demonstrated Understanding    Mobility Training, taught by Michelle Lee PT at 4/3/2024  3:58 PM.  Learner: Patient  Readiness: Acceptance  Method: Explanation  Response: Verbalizes Understanding, Demonstrated Understanding    Education Comments  No comments found.    EDUCATION:       Encounter Problems       Encounter Problems (Active)       Balance       Pt will demonstrate ability to complete sitting static/dynamic balance activities with unilateral UE support and no LOB for increase in safety up D/C.  (Progressing)       Start:  04/02/24    Expected End:  04/16/24               Mobility       Pt will demonstrated ability to complete 5 lateral side steps with modAx1, LRAD, proper form and no balance deficits for safe DC. (Progressing)       Start:  04/02/24    Expected End:  04/16/24            Pt will demonstrate ability to complete ther ex activities to improve functional strength for increase in independence upon DC.  (Progressing)       Start:  04/02/24    Expected End:  04/16/24               PT Transfers       Pt will demonstrated ability to complete bed mobility and sit<>stand transfers with mod assistance x2 and use of assistive  device to safely DC. (Progressing)       Start:  04/02/24    Expected End:  04/16/24

## 2024-04-03 NOTE — PROGRESS NOTES
ECMO Progress Note    Patient requires ECMO support. Drainage cannula site, cannula, pump, oxygenator, return cannula and return cannula site clinically inspected. RPM and sweep reviewed and adjusted appropriate to clinical context. Anticoagulation status and intensity discussed. Plan for weaning, next clinical steps discussed with team and family. Discussed with cardiothoracic surgeon, perfusion, palliative care and physical/occupational therapy    ECMO Indication: Cardiac transplant rejection/heart failure  ECMO Cannula Configuration: R femoral VA  ECMO circuit run from drainage cannula to pump to oxygenator to return cannula: Completed  Presence of cannula bleeding: No signs of bleeding  Presence of oxygenator clot: No appreciable clots  Pre/post PaO2 adequate: Appropriate  LV Unloading/Pulse Pressure: Impella in place  Distal Perfusion: DRC in place  Anticoagulation Plan/Monitoring: Heparin low dose and continue to increase to goal given recent epistaxis  Mobility Plan/Candidacy: Promote mobility with PT/OT  Path to decannulation/anticipated decannulation date: Awaiting retransplantation    ECMO:     Most Recent Range Past 24hrs   Flow 3.61 Patient Flow (L/min)  Min: 3.39  Max: 3.76   Speed 3189 Circuit Flow (RPM)  Min: 3186  Max: 3191   Sweep 0.5 Sweep Gas Flow Rate (L/min)  Min: 0.5  Max: 0.5       [Held by provider] dexmedeTOMIDine, 0.1-1.5 mcg/kg/hr, Last Rate: Stopped (04/03/24 0825)  heparin, 400 Units/hr, Last Rate: 400 Units/hr (04/03/24 1700)  lactated Ringer's, 5 mL/hr, Last Rate: 5 mL/hr (04/03/24 1700)  sodium bicarbonate infusion Impella Purge 25 mEq/1000 mL D5W, 10 mL/hr, Last Rate: 10 mL/hr (04/03/24 1700)           Feliciano Lee MD

## 2024-04-03 NOTE — PROGRESS NOTES
Occupational Therapy    Occupational Therapy Treatment    Name: Charla Bowles  MRN: [id number]  : 1990  Date: 24  Time Calculation  Start Time: 1439  Stop Time: 1504  Time Calculation (min): 25 min    Assessment:  End of Session Communication: Bedside nurse, Physician  End of Session Patient Position: Bed, 3 rail up, Alarm off, not on at start of session    Plan:  Treatment Interventions: ADL retraining, Functional transfer training, Endurance training, UE strengthening/ROM, Cognitive reorientation, Patient/family training, Equipment evaluation/education, Neuromuscular reeducation, Fine motor coordination activities, Compensatory technique education  OT Frequency: 4 times per week  OT Discharge Recommendations: High intensity level of continued care  Equipment Recommended upon Discharge:  (TBD)  OT Recommended Transfer Status: Assist of 2  OT - OK to Discharge: Yes    Subjective   General:  OT Last Visit  OT Received On: 24  Co-Treatment: PT  Co-Treatment Reason: Pt on ECMO requiring skilled 2 person assist for safe mobility  Prior to Session Communication: Bedside nurse (ECMO specialist)  Patient Position Received: Bed, 3 rail up, Alarm off, not on at start of session  Family/Caregiver Present: No  General Comment: Pt supine in bed upon arrival, agreeable to participate. Limited by back pain but motivated. Right axillary impella (P-4) flow 1.5. Right femoral VA ECMO 90% FiO2, sweep .5, flow 3.68. Sites examined pre, during and post mobility and stable. RN and ECMO specialist in room for entirety of session.     Precautions:  Hearing/Visual Limitations: WFL  Medical Precautions: Cardiac precautions, Fall precautions  Precautions Comment: R axillary impella precautions- no pushing/pulling with R UE, No lifting R UE above shoulder height, no R UE humeral EXT past plane of body, R femoral ECMO precautions, MAP 70-90, protective precautions    Vitals:  Vital Signs  Heart Rate: (!) 118 (Max noted  140; Post: 128)  Resp: (!) 40 (Post: 40)  SpO2: 100 % (Post: 100)  BP: 102/89 (Post: 106/78)  MAP (mmHg): 95 (Post: 86)  Lines/Tubes/Drains:  ECMO Cannulation Peripheral Right;Femoral artery;Femoral vein (Active)   Number of days: 6       Arterial Line 03/27/24 Right Radial (Active)   Number of days: 6       Ventricular Assist Device Impella 5.5 (Active)   Number of days: 32       Hemodialysis Cath Triple lumen Right Jugular (Active)   Number of days: 24       Cognition:  Overall Cognitive Status: Within Functional Limits  Arousal/Alertness: Appropriate responses to stimuli  Orientation Level: Oriented X4  Cognition Comments: Improved adherence to precautions this date  Insight: Moderate    Pain Assessment:  Pain Assessment  Pain Assessment:  (back pain, not quantified)     Objective     Functional Standing Tolerance:  Functional Standing Tolerance  Functional Standing Tolerance Comments: Pt tolerated ~1 minute static standing with min A x2. Engaged in back extension in standing for back pain.    Bed Mobility/Transfers:     Bed Mobility  Bed Mobility: Yes  Bed Mobility 1  Bed Mobility 1: Supine to sitting, Sitting to supine  Level of Assistance 1: Maximum assistance (x2)  Bed Mobility Comments 1: draw sheet, HOB elevated, +1 assist for management of ECMO amd impella    Transfers  Transfer: Yes  Transfer 1  Transfer From 1: Sit to  Transfer to 1: Stand  Transfer Level of Assistance 1: Moderate assistance (x2 assist)  Trials/Comments 1: increased time required  Transfers 2  Transfer From 2: Stand to  Transfer to 2: Sit  Transfer Level of Assistance 2: Moderate assistance (x2)  Trials/Comments 2: decreased eccentric control noted       Balance:  Static Sitting Balance  Static Sitting-Level of Assistance: Close supervision  Static Sitting-Comment/Number of Minutes: intermittent excessive forward lean noted     Therapy/Activity:      Therapeutic Activity  Therapeutic Activity Performed: Yes  Therapeutic Activity 1: Pt  sat EOB ~12 minutes. Primarily CGA for sitting balance with occasional min A for comfort. While seated EOB, pt tolerated trunk rotation, scapular retraction, and backward shoulder rolls as well as passive scapular elevation and manual therapy to tender areas of the back.  Therapeutic Activity 2: Hot packs provided and applied on pt's back at end of session.      Outcome Measures:  [facility] Daily Activity  Putting on and taking off regular lower body clothing: Total  Bathing (including washing, rinsing, drying): A lot  Putting on and taking off regular upper body clothing: A little  Toileting, which includes using toilet, bedpan or urinal: Total  Taking care of personal grooming such as brushing teeth: A little  Eating Meals: A little  Daily Activity - Total Score: 13     Education Documentation  Handouts, taught by Marcia Thomason OT at 4/3/2024  3:26 PM.  Learner: Patient  Readiness: Acceptance  Method: Explanation, Demonstration  Response: Verbalizes Understanding    Body Mechanics, taught by Marcia Thomason OT at 4/3/2024  3:26 PM.  Learner: Patient  Readiness: Acceptance  Method: Explanation, Demonstration  Response: Verbalizes Understanding    Precautions, taught by Marcia Thomason OT at 4/3/2024  3:26 PM.  Learner: Patient  Readiness: Acceptance  Method: Explanation, Demonstration  Response: Verbalizes Understanding    Home Exercise Program, taught by Marcia Thomason OT at 4/3/2024  3:26 PM.  Learner: Patient  Readiness: Acceptance  Method: Explanation, Demonstration  Response: Verbalizes Understanding    ADL Training, taught by Marcia Thomason OT at 4/3/2024  3:26 PM.  Learner: Patient  Readiness: Acceptance  Method: Explanation, Demonstration  Response: Verbalizes Understanding    Handouts, taught by Marcia Thomason OT at 4/2/2024  4:09 PM.  Learner: Patient  Readiness: Acceptance  Method: Explanation, Demonstration  Response: Verbalizes  Understanding    Body Mechanics, taught by Marcia Thomason, OT at 4/2/2024  4:09 PM.  Learner: Patient  Readiness: Acceptance  Method: Explanation, Demonstration  Response: Verbalizes Understanding    Precautions, taught by Marcia Thomason, OT at 4/2/2024  4:09 PM.  Learner: Patient  Readiness: Acceptance  Method: Explanation, Demonstration  Response: Verbalizes Understanding    Home Exercise Program, taught by Marcia Thomason, OT at 4/2/2024  4:09 PM.  Learner: Patient  Readiness: Acceptance  Method: Explanation, Demonstration  Response: Verbalizes Understanding    ADL Training, taught by Marcia Thomason, OT at 4/2/2024  4:09 PM.  Learner: Patient  Readiness: Acceptance  Method: Explanation, Demonstration  Response: Verbalizes Understanding    Education Comments  No comments found.        Goals:  Encounter Problems       Encounter Problems (Active)       ADLs       Patient will complete daily grooming tasks in standing with LRAD with supervision level of assistance and PRN adaptive equipment. (Progressing)       Start:  03/01/24    Expected End:  04/16/24               ADLs       Patient with complete lower body dressing with stand by assist level of assistance donning and doffing all LE clothes  with PRN adaptive equipment (Not met)       Start:  03/04/24    Expected End:  03/18/24    Resolved:  04/02/24    Updated to: Patient with complete UB bathing with stand by assist level of assistance  with PRN adaptive equipment    Update reason: re eval          Patient will complete toileting including hygiene clothing management/hygiene with stand by assist level of assistance. (Not met)       Start:  03/04/24    Expected End:  03/18/24    Resolved:  04/02/24    Updated to: Patient will complete upper body dressing including with mod I level of assistance.    Update reason: re eval         Patient with complete UB bathing with stand by assist level of assistance  with PRN adaptive  equipment (Progressing)       Start:  04/02/24    Expected End:  04/16/24                Patient will complete upper body dressing including with mod I level of assistance. (Progressing)       Start:  04/02/24    Expected End:  04/16/24                   BALANCE       Pt will increase dynamic standing tolerance to >10 min with supervision using LRAD during functional mobility/ADLs without LOB in order to improve activity tolerance and balance for self-care tasks.  (Progressing)       Start:  03/01/24    Expected End:  04/16/24               COGNITION/SAFETY       Patient will participate in cognitive activities to demonstrate WFL score on further cognitive assessments, including Medi-Cog, MoCA and remain A&O x3, CAM (-).  (Progressing)       Start:  03/01/24    Expected End:  04/16/24               EXERCISE/STRENGTHENING       Patient will be educated on BUE HEP for increased ADL/IADL performance. (Progressing)       Start:  03/01/24    Expected End:  04/16/24               MOBILITY       Patient will perform Functional mobility max Household distances/Community Distances with supervision level of assistance and least restrictive device in order to improve safety and functional mobility. (Progressing)       Start:  03/01/24    Expected End:  04/16/24 04/03/24 at 3:28 PM   Marcia Thomason, OT   [phone]

## 2024-04-03 NOTE — PROGRESS NOTES
"Nutrition Follow Up Assessment:   Nutrition Assessment    Reason for Assessment: Dietitian discretion    [facility] ECMO and Impella as well as CVVH > plan for listing for dual heart & kidney transplant.     Nutrition History:  Energy Intake: Poor < 50 %  Food and Nutrient History: Bedside RN reports pt was not able to get what she wanted this morning for breakfast due to diet restrictions. Recommend liberalization to regular. Met with pt and was also receptive to wild berry Ensure Clear ONS.  Per health [facility] TF sent 4/01 -- pt ordered x2 meals 4/02. If she ate 100% of both meals yesterday = 950 kcals and 52 gm protein = ~50% est kcal needs.       Anthropometrics:  Height: 154.9 cm (5' 0.98\")   Weight: 90.3 kg (199 lb 1.2 oz)   BMI (Calculated): 40.84  IBW/kg (Dietitian Calculated): 47.7 kg  Percent of IBW: 201 %  Adjusted Body Weight (kg): 59 kg    Weight History:   Date/Time Weight   04/01/24 0600 90.3 kg (199 lb 1.2 oz)   03/26/24 0539 96 kg (211 lb 10.3 oz)   03/25/24 0600 96.9 kg (213 lb 10 oz)   03/24/24 0400 97.1 kg (214 lb 1.1 oz)   03/23/24 0413 99.3 kg (218 lb 14.7 oz)   03/22/24 2200 98 kg (216 lb 0.8 oz)   03/22/24 0600 98 kg (216 lb 0.8 oz)   03/22/24 0000 98 kg (216 lb 0.8 oz)   03/21/24 2000 98 kg (216 lb 0.8 oz)   03/21/24 0615 94.2 kg (207 lb 10.8 oz)   03/20/24 1830 99 kg (218 lb 4.1 oz)   03/20/24 1400 99 kg (218 lb 4.1 oz)     Weight Change %:  Weight History / % Weight Change: wt now trending back down ~9 kgs over past 2 weeks    Nutrition Focused Physical Exam Findings:  Unable to perform given multiple medical devices   Edema:  Edema: +2 mild  Edema Location: generalized  Physical Findings:  Skin: Positive (left groin nonhealing wound from prior ECMO site)    Nutrition Significant Labs:  CBC Trend:   Results from last 7 days   Lab Units 04/03/24  0015 04/02/24  1551 04/02/24  0008 04/01/24  1636   WBC AUTO x10*3/uL 5.2 5.6 4.9 5.2   RBC AUTO x10*6/uL 2.50* 2.72* 2.47* 2.64*   HEMOGLOBIN g/dL 6.9* " 7.7* 7.0* 7.3*   HEMATOCRIT % 22.3* 24.0* 22.4* 23.7*   MCV fL 89 88 91 90   PLATELETS AUTO x10*3/uL 43* 60* 33* 37*    , A1C:  Lab Results   Component Value Date    HGBA1C 5.7 (H) 03/07/2024   , BG POCT trend:   Results from last 7 days   Lab Units 04/03/24  1222 04/03/24  0817 04/02/24  1605 04/02/24  1241 04/02/24  0906   POCT GLUCOSE mg/dL 138* 130* 126* 145* 125*    , Liver Function Trend:   Results from last 7 days   Lab Units 04/03/24  0015 04/02/24  0008 04/01/24  0233 03/31/24  1156   ALK PHOS U/L 157* 162* 177* 201*   AST U/L 70* 112* 182* 319*   ALT U/L 18 19 21 29   BILIRUBIN TOTAL mg/dL 1.6* 2.2* 2.4* 4.2*    , Renal Lab Trend:   Results from last 7 days   Lab Units 04/03/24  0015 04/02/24  1551 04/02/24  0008 04/01/24  1212   POTASSIUM mmol/L 4.2 4.5 4.3 4.6   PHOSPHORUS mg/dL 1.9* 2.7 1.7* 2.5   SODIUM mmol/L 135* 134* 136 137   MAGNESIUM mg/dL 2.19  --  2.29 2.29   EGFR mL/min/1.73m*2 >90 >90 >90 >90   BUN mg/dL 10 11 11 13   CREATININE mg/dL 0.81 0.79 0.82 0.85    , Vit D:   Lab Results   Component Value Date    VITD25 92 01/04/2023        Nutrition Specific Medications:  Scheduled medications  acetaminophen, 650 mg, oral, q6h  calcium carbonate-vitamin D3, 1 tablet, oral, Daily  cyanocobalamin, 500 mcg, oral, Daily  darbepoetin floyd, 100 mcg, subcutaneous, q14 days  ferrous sulfate (325 mg ferrous sulfate), 65 mg of iron, oral, Daily with breakfast  folic acid, 1 mg, oral, Daily  insulin lispro, 0-5 Units, subcutaneous, TID with meals  levothyroxine, 200 mcg, oral, Daily  lidocaine, 1 patch, transdermal, Daily  melatonin, 10 mg, oral, Daily  multivitamin with minerals, 1 tablet, oral, Daily  mycophenolate, 360 mg, oral, BID  nystatin, 5 mL, Swish & Swallow, q6h  oxygen, , inhalation, Continuous - Inhalation  oxymetazoline, 2 spray, Left Nostril, q12h  oxymetazoline, 2 spray, Left Nostril, q12h  pantoprazole, 40 mg, intravenous, Daily  predniSONE, 10 mg, oral, Daily  psyllium, 1 packet, oral,  Daily  QUEtiapine, 50 mg, oral, Nightly  rosuvastatin, 40 mg, oral, Nightly  sennosides-docusate sodium, 1 tablet, oral, BID  sod phos di, mono-K phos mono, 500 mg, oral, 4x daily  sodium chloride, 1 spray, Each Nostril, 4x daily  [Held by provider] sulfamethoxazole-trimethoprim, 80 mg of trimethoprim, oral, Daily  tacrolimus, 3 mg, oral, q12h TRICIA  valGANciclovir, 450 mg, oral, q48h      Continuous medications  [Held by provider] dexmedeTOMIDine, 0.1-1.5 mcg/kg/hr, Last Rate: Stopped (04/03/24 0825)  heparin, 300 Units/hr, Last Rate: 300 Units/hr (04/03/24 1100)  lactated Ringer's, 5 mL/hr, Last Rate: 5 mL/hr (04/03/24 1100)  sodium bicarbonate infusion Impella Purge 25 mEq/1000 mL D5W, 10 mL/hr, Last Rate: 10 mL/hr (04/03/24 1100)      PRN medications  PRN medications: bisacodyl, calcium gluconate, calcium gluconate, dextrose, dextrose **OR** glucagon, hydrALAZINE, HYDROmorphone, artificial tears, magnesium sulfate, magnesium sulfate, microfibrllar collagen, mupirocin, naloxone, ondansetron, oxyCODONE, sodium chloride      I/O:   Last BM Date: 04/02/24; Stool Appearance: Loose (04/03/24 0800)    Dietary Orders (From admission, onward)       Start     Ordered    04/03/24 1149  Adult diet Regular, 2-3 grams sodium, Carb Controlled; 75 gram carb/meal, 45 gram Carb evening snack  Diet effective now        Question Answer Comment   Diet type Regular    Diet type 2-3 grams sodium    Diet type Carb Controlled    Carb diet selection: 75 gram carb/meal, 45 gram Carb evening snack        04/03/24 1149    04/03/24 1042  Oral nutritional supplements  Until discontinued        Comments: Berry flavor   Question Answer Comment   Deliver with Breakfast    Deliver with Lunch    Deliver with Dinner    Select supplement: Ensure Clear        04/03/24 1042                     Estimated Needs:   Total Energy Estimated Needs (kCal): 1900 kCal  Method for Estimating Needs: MSJ = 1586kcal x 1.2  Total Protein Estimated Needs (g): 75  g  Method for Estimating Needs: 1.5 g/kg+ IBW  Total Fluid Estimated Needs (mL):  (per team)           Nutrition Diagnosis   Malnutrition Diagnosis  Patient has Malnutrition Diagnosis: Yes  Diagnosis Status: Declining  Malnutrition Diagnosis: Moderate malnutrition related to acute disease or injury  As Evidenced by: pt's reported intake likely meeting <75% of estimated needs for at least 7 days, previous 5% weight loss in 1 month, LOS 42.  Additional Assessment Information: Due to already present malnutrition with low threshold for declines, recommend pt to benefit from liberalized regular diet for increased caloric content.  Can re-address necessity of therapuetic diet recommendations after transplant status.    Nutrition Diagnosis  Patient has Nutrition Diagnosis: Yes  Diagnosis Status (1): Ongoing  Nutrition Diagnosis 1:  (protein)  Related to (1): altered metabolism for healing/recovery from critical illness  As Evidenced by (1): [facility]init[facility] of [facility] and [facility], s/p Impella  Additional Nutrition Diagnosis: Diagnosis 3  Diagnosis Status (2): Resolved  Nutrition Diagnosis 2: Unintended weight gain  Related to (2): acute on chronic illness  As Evidenced by (2): up 41# since end of 3/29/23  Additional Assessment Information (2): wt now trending down from fluid removal  Diagnosis Status (3): New  Nutrition Diagnosis 3: Inadequate oral intake  Related to (3): acute events (nose bleed, nausea/vomiting, constipation)  As Evidenced by (3): reduced ability to consume PO past few days  Additional Assessment Information (3): pt now willing to take berry Ensure Clear ONS       Nutrition Interventions/Recommendations         Nutrition Prescription:  Individualized Nutrition Prescription Provided for : Diet per team + Ensure Clears  (240 kcals & 8gm protein per carton)        Nutrition Interventions:   Interventions: Medical food supplement  Medical Food Supplement: Commercial beverage  Goal: Ensure Clear TID for 240 kcals  and 8gm protein per 8oz carton    Collaboration and Referral of Nutrition Care: Team meeting involving nutrition professional  Goal: [facility] team requests continuation of carb controlled diet d/t prior need for insulin drip + low salt diet -- remove low fat restriction for the time being.         Nutrition Monitoring and Evaluation   Food/Nutrient Related History Monitoring  Monitoring and Evaluation Plan: Energy intake  Criteria: >50-60% est needs met via PO diet + ONS    Body Composition/Growth/Weight History  Weight: Estimated dry weight  Criteria: establish dry wt    Biochemical Data, Medical Tests and Procedures  Monitoring and Evaluation Plan: Electrolyte/renal panel  Criteria: lytes WNL    Nutrition Focused Physical Findings  Adipose: Loss of subcutaneous fat  Criteria: minimize loss  Muscles: Muscle atrophy  Criteria: minimize loss       Time Spent/Follow-up Reminder:   Time Spent (min): 45 minutes  Last Date of Nutrition Visit: 04/03/24  Nutrition Follow-Up Needed?: Dietitian to reassess per policy  Follow up Comment: listed for kidney/heart transplant > rec'd liberalize diet & ONS

## 2024-04-04 PROBLEM — E44.0 MODERATE PROTEIN-CALORIE MALNUTRITION (MULTI): Status: ACTIVE | Noted: 2024-01-01

## 2024-04-04 PROBLEM — Z95.811: Status: ACTIVE | Noted: 2024-01-01

## 2024-04-04 PROBLEM — Z92.81: Status: ACTIVE | Noted: 2024-01-01

## 2024-04-04 NOTE — PROGRESS NOTES
[facility]  ADVANCED HEART FAILURE AND TRANSPLANT CARDIOLOGY   Charla Bowles/[id number]    Admit Date: 2/15/2024  Hospital Length of Stay: 49   ICU Length of Stay: 7d 13h   Primary Service: CTICU      Charla Bowles is a 34 y.o. female on day 49 of admission presenting with Cardiogenic shock (CMS/HCC).    Subjective   Patient had improved sleep overnight following increased Seroquel dosing and reinitation of precedex infusion. Dr. Marina requested restarting zosyn due to patient having chills, although patient believes this to be 2/2 to pain. Impella weaned back down to P2 this AM. Stable anemia/thrombocytopenia without signs of bleeding.     Objective     Physical Exam  Constitutional:       Appearance: She is ill-appearing.   HENT:      Head: Normocephalic.      Comments: Not actively bleeding      Mouth/Throat:      Mouth: Mucous membranes are moist.      Pharynx: Oropharynx is clear. No oropharyngeal exudate or posterior oropharyngeal erythema.   Eyes:      Extraocular Movements: Extraocular movements intact.      Conjunctiva/sclera: Conjunctivae normal.      Pupils: Pupils are equal, round, and reactive to light.   Neck:      Vascular: No JVD.   Cardiovascular:      Rate and Rhythm: Tachycardia present.      Comments: Right axillary impella in place ~45cm at hub (no hematoma), Right femoral VA ECMO in place with reperfusion catheter (site appears clean and dry). Dopplerable radial and ulnar pulses bilaterally. Dopplerable DP/PT pulses bilateral.   Pulmonary:      Effort: No accessory muscle usage, respiratory distress or retractions.      Breath sounds: Normal breath sounds. No wheezing, rhonchi or rales.      Comments: RA - 2L NC. Tachypnea w/ RR 30-50, although does not appear in respiratory distress.    Abdominal:      General: Abdomen is flat. Bowel sounds are normal. There is no distension.      Palpations: Abdomen is soft. There is no mass.      Hernia: No hernia is  "present.   Musculoskeletal:         General: Swelling (+2 BLE edema) present. No tenderness. Normal range of motion.      Cervical back: Full passive range of motion without pain.   Skin:     General: Skin is dry.      Capillary Refill: Capillary refill takes less than 2 seconds.      Coloration: Skin is not jaundiced.      Findings: Bruising and lesion (Left groin wound appears clean with granulation tissue. No signs of infection.) present. No erythema, laceration, rash or wound.   Neurological:      General: No focal deficit present.      Mental Status: She is alert and oriented to person, place, and time.   Psychiatric:         Mood and Affect: Mood normal.         Behavior: Behavior normal.         Last Recorded Vitals  Blood pressure 102/89, pulse (!) 126, temperature 36.6 °C (97.9 °F), temperature source Temporal, resp. rate (!) 48, height 1.549 m (5' 0.98\"), weight 90.3 kg (199 lb 1.2 oz), SpO2 100 %.  Intake/Output last 3 Shifts:  I/O last 3 completed shifts:  In: 1635.3 (18.1 mL/kg) [P.O.:200; I.V.:835.3 (9.3 mL/kg); IV Piggyback:600]  Out: 1973 (21.8 mL/kg) [Other:1973]  Weight: 90.3 kg     Relevant Results  Scheduled medications  acetaminophen, 650 mg, oral, q6h  calcium carbonate-vitamin D3, 1 tablet, oral, Daily  cyanocobalamin, 500 mcg, oral, Daily  darbepoetin floyd, 100 mcg, subcutaneous, q14 days  ferrous sulfate (325 mg ferrous sulfate), 65 mg of iron, oral, Daily with breakfast  folic acid, 1 mg, oral, Daily  insulin lispro, 0-5 Units, subcutaneous, TID with meals  levothyroxine, 200 mcg, oral, Daily  lidocaine, 1 patch, transdermal, Daily  melatonin, 10 mg, oral, Daily  methocarbamol, 500 mg, oral, q6h TRICIA  multivitamin with minerals, 1 tablet, oral, Daily  mycophenolate, 360 mg, oral, BID  nystatin, 5 mL, Swish & Swallow, q6h  oxygen, , inhalation, Continuous - Inhalation  oxymetazoline, 2 spray, Left Nostril, q12h  pantoprazole, 40 mg, intravenous, Daily  piperacillin-tazobactam, 3.375 g, " intravenous, q8h  predniSONE, 10 mg, oral, Daily  psyllium, 1 packet, oral, Daily  QUEtiapine, 50 mg, oral, Nightly  rosuvastatin, 40 mg, oral, Nightly  sennosides-docusate sodium, 1 tablet, oral, BID  sod phos di, mono-K phos mono, 500 mg, oral, 4x daily  sodium chloride, 1 spray, Each Nostril, 4x daily  [Held by provider] sulfamethoxazole-trimethoprim, 80 mg of trimethoprim, oral, Daily  tacrolimus, 3 mg, oral, q12h TRICIA  [START ON 4/5/2024] valGANciclovir, 450 mg, oral, q48h      Continuous medications  [Held by provider] dexmedeTOMIDine, 0.1-1.5 mcg/kg/hr, Last Rate: Stopped (04/03/24 0825)  heparin, 500 Units/hr, Last Rate: 500 Units/hr (04/04/24 0816)  lactated Ringer's, 5 mL/hr, Last Rate: 5 mL/hr (04/04/24 0700)  PrismaSol 4/2.5, 2,400 mL/hr  sodium bicarbonate infusion Impella Purge 25 mEq/1000 mL D5W, 10 mL/hr, Last Rate: 10 mL/hr (04/04/24 0700)      PRN medications  PRN medications: bisacodyl, calcium gluconate, calcium gluconate, dextrose, dextrose **OR** glucagon, hydrALAZINE, HYDROmorphone, hydrOXYzine HCL, ipratropium-albuteroL, artificial tears, magnesium sulfate, magnesium sulfate, microfibrllar collagen, mupirocin, naloxone, ondansetron, oxyCODONE, oxygen, sodium chloride     Results for orders placed or performed during the hospital encounter of 02/15/24 (from the past 24 hour(s))   Blood Gas Arterial Full Panel   Result Value Ref Range    POCT pH, Arterial 7.40 7.38 - 7.42 pH    POCT pCO2, Arterial 34 (L) 38 - 42 mm Hg    POCT pO2, Arterial 148 (H) 85 - 95 mm Hg    POCT SO2, Arterial 100 94 - 100 %    POCT Oxy Hemoglobin, Arterial 96.3 94.0 - 98.0 %    POCT Hematocrit Calculated, Arterial 24.0 (L) 36.0 - 46.0 %    POCT Sodium, Arterial 132 (L) 136 - 145 mmol/L    POCT Potassium, Arterial 4.0 3.5 - 5.3 mmol/L    POCT Chloride, Arterial 102 98 - 107 mmol/L    POCT Ionized Calcium, Arterial 1.05 (L) 1.10 - 1.33 mmol/L    POCT Glucose, Arterial 134 (H) 74 - 99 mg/dL    POCT Lactate, Arterial 1.1 0.4  - 2.0 mmol/L    POCT Base Excess, Arterial -3.3 (L) -2.0 - 3.0 mmol/L    POCT HCO3 Calculated, Arterial 21.1 (L) 22.0 - 26.0 mmol/L    POCT Hemoglobin, Arterial 7.9 (L) 12.0 - 16.0 g/dL    POCT Anion Gap, Arterial 13 10 - 25 mmo/L    Patient Temperature 37.0 degrees Celsius    FiO2 90 %   POCT GLUCOSE   Result Value Ref Range    POCT Glucose 138 (H) 74 - 99 mg/dL   Calcium, Ionized   Result Value Ref Range    POCT Calcium, Ionized 1.03 (L) 1.1 - 1.33 mmol/L   CBC   Result Value Ref Range    WBC 6.1 4.4 - 11.3 x10*3/uL    nRBC 2.0 (H) 0.0 - 0.0 /100 WBCs    RBC 2.63 (L) 4.00 - 5.20 x10*6/uL    Hemoglobin 7.4 (L) 12.0 - 16.0 g/dL    Hematocrit 23.1 (L) 36.0 - 46.0 %    MCV 88 80 - 100 fL    MCH 28.1 26.0 - 34.0 pg    MCHC 32.0 32.0 - 36.0 g/dL    RDW 23.0 (H) 11.5 - 14.5 %    Platelets 86 (L) 150 - 450 x10*3/uL   Renal Function Panel   Result Value Ref Range    Glucose 147 (H) 74 - 99 mg/dL    Sodium 135 (L) 136 - 145 mmol/L    Potassium 4.3 3.5 - 5.3 mmol/L    Chloride 101 98 - 107 mmol/L    Bicarbonate 23 21 - 32 mmol/L    Anion Gap 15 10 - 20 mmol/L    Urea Nitrogen 11 6 - 23 mg/dL    Creatinine 1.00 0.50 - 1.05 mg/dL    eGFR 76 >60 mL/min/1.73m*2    Calcium 7.9 (L) 8.6 - 10.6 mg/dL    Phosphorus 2.1 (L) 2.5 - 4.9 mg/dL    Albumin 4.3 3.4 - 5.0 g/dL   Blood Gas Arterial Full Panel   Result Value Ref Range    POCT pH, Arterial 7.37 (L) 7.38 - 7.42 pH    POCT pCO2, Arterial 34 (L) 38 - 42 mm Hg    POCT pO2, Arterial 104 (H) 85 - 95 mm Hg    POCT SO2, Arterial 100 94 - 100 %    POCT Oxy Hemoglobin, Arterial 95.7 94.0 - 98.0 %    POCT Hematocrit Calculated, Arterial 24.0 (L) 36.0 - 46.0 %    POCT Sodium, Arterial 132 (L) 136 - 145 mmol/L    POCT Potassium, Arterial 4.2 3.5 - 5.3 mmol/L    POCT Chloride, Arterial 101 98 - 107 mmol/L    POCT Ionized Calcium, Arterial 1.07 (L) 1.10 - 1.33 mmol/L    POCT Glucose, Arterial 145 (H) 74 - 99 mg/dL    POCT Lactate, Arterial 0.9 0.4 - 2.0 mmol/L    POCT Base Excess, Arterial  -5.0 (L) -2.0 - 3.0 mmol/L    POCT HCO3 Calculated, Arterial 19.7 (L) 22.0 - 26.0 mmol/L    POCT Hemoglobin, Arterial 8.1 (L) 12.0 - 16.0 g/dL    POCT Anion Gap, Arterial 16 10 - 25 mmo/L    Patient Temperature 37.0 degrees Celsius    FiO2 90 %   POCT GLUCOSE   Result Value Ref Range    POCT Glucose 145 (H) 74 - 99 mg/dL   Blood Culture    Specimen: Peripheral Venipuncture; Blood culture   Result Value Ref Range    Blood Culture Loaded on Instrument - Culture in progress    Blood Culture    Specimen: Peripheral Venipuncture; Blood culture   Result Value Ref Range    Blood Culture Loaded on Instrument - Culture in progress    Blood Gas Arterial Full Panel   Result Value Ref Range    POCT pH, Arterial 7.33 (L) 7.38 - 7.42 pH    POCT pCO2, Arterial 37 (L) 38 - 42 mm Hg    POCT pO2, Arterial 206 (H) 85 - 95 mm Hg    POCT SO2, Arterial 100 94 - 100 %    POCT Oxy Hemoglobin, Arterial 96.2 94.0 - 98.0 %    POCT Hematocrit Calculated, Arterial 21.0 (L) 36.0 - 46.0 %    POCT Sodium, Arterial 133 (L) 136 - 145 mmol/L    POCT Potassium, Arterial 4.0 3.5 - 5.3 mmol/L    POCT Chloride, Arterial 104 98 - 107 mmol/L    POCT Ionized Calcium, Arterial 1.04 (L) 1.10 - 1.33 mmol/L    POCT Glucose, Arterial 115 (H) 74 - 99 mg/dL    POCT Lactate, Arterial 0.6 0.4 - 2.0 mmol/L    POCT Base Excess, Arterial -5.9 (L) -2.0 - 3.0 mmol/L    POCT HCO3 Calculated, Arterial 19.5 (L) 22.0 - 26.0 mmol/L    POCT Hemoglobin, Arterial 6.9 (L) 12.0 - 16.0 g/dL    POCT Anion Gap, Arterial 14 10 - 25 mmo/L    Patient Temperature 37.0 degrees Celsius    FiO2 28 %   Reticulocytes   Result Value Ref Range    Retic % 4.8 (H) 0.5 - 2.0 %    Retic Absolute 0.120 (H) 0.018 - 0.083 x10*6/uL    Reticulocyte Hemoglobin 29 28 - 38 pg    Immature Retic fraction 35.5 (H) <=16.0 %   Haptoglobin   Result Value Ref Range    Haptoglobin     CBC and Auto Differential   Result Value Ref Range    WBC 6.0 4.4 - 11.3 x10*3/uL    nRBC 2.3 (H) 0.0 - 0.0 /100 WBCs    RBC 2.48  (L) 4.00 - 5.20 x10*6/uL    Hemoglobin 6.9 (L) 12.0 - 16.0 g/dL    Hematocrit 22.3 (L) 36.0 - 46.0 %    MCV 90 80 - 100 fL    MCH 27.8 26.0 - 34.0 pg    MCHC 30.9 (L) 32.0 - 36.0 g/dL    RDW 23.1 (H) 11.5 - 14.5 %    Platelets 62 (L) 150 - 450 x10*3/uL    Neutrophils % 73.0 40.0 - 80.0 %    Immature Granulocytes %, Automated 7.7 (H) 0.0 - 0.9 %    Lymphocytes % 3.8 13.0 - 44.0 %    Monocytes % 15.0 2.0 - 10.0 %    Eosinophils % 0.2 0.0 - 6.0 %    Basophils % 0.3 0.0 - 2.0 %    Neutrophils Absolute 4.39 1.20 - 7.70 x10*3/uL    Immature Granulocytes Absolute, Automated 0.46 0.00 - 0.70 x10*3/uL    Lymphocytes Absolute 0.23 (L) 1.20 - 4.80 x10*3/uL    Monocytes Absolute 0.90 0.10 - 1.00 x10*3/uL    Eosinophils Absolute 0.01 0.00 - 0.70 x10*3/uL    Basophils Absolute 0.02 0.00 - 0.10 x10*3/uL   Lactate Dehydrogenase   Result Value Ref Range     (H) 84 - 246 U/L   Calcium, Ionized   Result Value Ref Range    POCT Calcium, Ionized 0.91 (L) 1.1 - 1.33 mmol/L   Heparin Assay, UFH   Result Value Ref Range    Heparin Unfractionated 0.2 See Comment Below for Therapeutic Ranges IU/mL   Morphology   Result Value Ref Range    RBC Morphology See Below     RBC Fragments Few     Alex Cells Few     Basophilic Stippling Present    Renal Function Panel   Result Value Ref Range    Glucose 122 (H) 74 - 99 mg/dL    Sodium 138 136 - 145 mmol/L    Potassium 4.2 3.5 - 5.3 mmol/L    Chloride 101 98 - 107 mmol/L    Bicarbonate 20 (L) 21 - 32 mmol/L    Anion Gap 21 (H) 10 - 20 mmol/L    Urea Nitrogen 18 6 - 23 mg/dL    Creatinine 1.52 (H) 0.50 - 1.05 mg/dL    eGFR 46 (L) >60 mL/min/1.73m*2    Calcium 7.8 (L) 8.6 - 10.6 mg/dL    Phosphorus 4.5 2.5 - 4.9 mg/dL    Albumin 3.9 3.4 - 5.0 g/dL   ECMO Arterial Blood Gas   Result Value Ref Range    POCT pH, Arterial ECMO 7.19 Reference range not established pH    POCT pCO2, Arterial ECMO 63 Reference range not established mm Hg    POCT pO2,  Arterial ECMO 367 Reference range not established mm  Hg    POCT SO2, Arterial ECMO 99 Reference range not established %    POCT Oxy Hemoglobin, Arterial ECMO 96.6 Reference range not established %    POCT Base Excess, Arterial ECMO -4.1 Reference range not established mmol/L    POCT HCO3 Calculated, Arterial ECMO 24.1 Reference range not established mmol/L    Patient Temperature 37.0 degrees Celsius    FiO2 90 %   ECMO Venous Blood Gas   Result Value Ref Range    POCT pH, Venous ECMO 7.17 Reference range not established pH    POCT pCO2, Venous ECMO 55 Reference range not established mm Hg    POCT pO2,  Venous  ECMO 48 Reference range not established mm Hg    POCT SO2, Venous ECMO 82 Reference range not established %    POCT Oxy Hemoglobin, Venous ECMO 80.1 Reference range not established %    POCT Base Excess, Venous ECMO -7.9 Reference range not established mmol/L    POCT HCO3 Calculated, Venous ECMO 20.1 Reference range not established mmol/L    Patient Temperature 37.0 degrees Celsius    FiO2 90 %   Blood Gas Arterial Full Panel Unsolicited   Result Value Ref Range    POCT pH, Arterial 7.41 7.38 - 7.42 pH    POCT pCO2, Arterial 34 (L) 38 - 42 mm Hg    POCT pO2, Arterial 120 (H) 85 - 95 mm Hg    POCT SO2, Arterial 100 94 - 100 %    POCT Oxy Hemoglobin, Arterial 96.0 94.0 - 98.0 %    POCT Hematocrit Calculated, Arterial 22.0 (L) 36.0 - 46.0 %    POCT Sodium, Arterial 133 (L) 136 - 145 mmol/L    POCT Potassium, Arterial 4.0 3.5 - 5.3 mmol/L    POCT Chloride, Arterial 103 98 - 107 mmol/L    POCT Ionized Calcium, Arterial 1.16 1.10 - 1.33 mmol/L    POCT Glucose, Arterial 115 (H) 74 - 99 mg/dL    POCT Lactate, Arterial 0.7 0.4 - 2.0 mmol/L    POCT Base Excess, Arterial -2.7 (L) -2.0 - 3.0 mmol/L    POCT HCO3 Calculated, Arterial 21.6 (L) 22.0 - 26.0 mmol/L    POCT Hemoglobin, Arterial 7.2 (L) 12.0 - 16.0 g/dL    POCT Anion Gap, Arterial 12 10 - 25 mmo/L    Patient Temperature 37.0 degrees Celsius   POCT GLUCOSE   Result Value Ref Range    POCT Glucose 115 (H) 74 - 99  mg/dL     *Note: Due to a large number of results and/or encounters for the requested time period, some results have not been displayed. A complete set of results can be found in Results Review.       Malnutrition Diagnosis Status: Declining  Malnutrition Diagnosis: Moderate malnutrition related to acute disease or injury  As Evidenced by: pt's reported intake likely meeting <75% of estimated needs for at least 7 days, previous 5% weight loss in 1 month, LOS 42.  I agree with the dietitian's malnutrition diagnosis.      Assessment/Plan   Ms. Yimi Bowles is a 34F with a PMHx sig for stage D HFrEF (PPCM) s/p ICD s/p CardioMEMs, hypothyroidism 2/2 thyroidectomy, obesity s/p gastric bypass (2017), and SLE who is s/p OHT (3/31/2022) with a post-OHT course complicated by RIJ/RUE DVTs, leukopenia, left groin seroma, and asymptomatic 2R ACR (11/2022) treated with steroids, s/p total hysterectomy (2023), Flu A (1/2024).     Originally [facility] to [facility] ED on 2/15/24 with complaints of N/V x 3 days and inability to keep medications down. Found to have acute systolic heart failure with primary graft failure and cardiogenic shock. Treated for suspected acute cellular vs. acute antibody mediated rejection; however, multiple cardiac biopsies negative for pathology indicating rejection or other causes of graft failure. Course has been complicated by multiple MCS devices for refractory cardiogenic shock (right femoral IABP: 2/18/24 - 3/1/24, Left femoral VA ECMO: 2/19/24 - 2/29/24, Right axillary impella 5.5: 3/1/24 - remains in place, 2nd right femoral VA ECMO: 3/27/24 - remains in place), ARF requiring RRT, acute liver injury, intubation due to hypoxic respiratory failure, severe hemolysis 2/2 to impella malposition, mild CMV viremia, bilateral provoked IJ DVTs, multiple episodes of epistaxis & coagulopathies requiring ongoing blood product transfusions.       Transferred to CTICU on 3/27/24 following right femoral VA ECMO  placement due to worsening cardiogenic shock and multi-organ failure despite Impella 5.5 support and multiple inotropes. Now awaiting suitable organ donation as UNOS status 1 for heart-kidney transplantation (listed 4/3/24).      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  #Acute renal failure requiring RRT   #Acute transaminitis  #Thrombocytopenia   #Anemia   #Provoked bilateral IJ DVTs    #Left SFA occlusion      Donor/Recipient Infectious history:  CMV: -/+ (low grade CMV viremia w/ levels < 35 on 3/1/24, repeat CMV levels remain negative since 3/27/24)  Toxo: -/-   Hep C: -/-     Rejection/Prophylaxis (transplants):  - Steroids: Continue 10mg PO prednisone daily  - Tacrolimus: 3.5mg PO @ 0630 and 3.5mg PO @ 1830 w/ daily levels drawn @ 0600  - Tacrolimus goal troughs: 8-10  - Myfortic acid: 360mg BID    - Antifungals: nystatin 500,000units Q6  - Antivirals: valcyte 450mg Q48hrs   - Anti PCP & Toxoplasmosis: holding Bactrim SS daily due to thrombocytopenia     Last cardiac biopsy: 2/16/24 with ACR grade 1R and no AMR (extremely low C4d+ detected), 2/20/24 negative for ACR and AMR  Last HLA: 4/2/24 negative for class 1 or 2 antibodies   Last RHC: closing numbers on 3/16/24 /86(97) RA 20, PAP 40/33(37), C.O./C.I. 5.5/2.8, PVR 88, SVR 1115   Last LHC: 2/18/24 negative for CAV and CAD   Last TTE/BOB: 3/27/24 shows LVEF 10-15% w/ global hypokinesis, severe RV dysfunction, mild-mod MR, trace TR and impella angled posteriorly   Osteopenia/osteoporosis prophylaxis: Vitamin D3 currently held. Continue calcium supplements  Peptic/gastric ulcer prophylaxis: Pantoprazole 40mg daily  CAV Prophylaxis: Holding Aspirin 81mg due to continued thrombocytopenia. Continue pravastatin daily.      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 Broad differential including SLE flair, giant cell vasculitis, CAV/CAD, viral cardiomyopathy,  sarcoidosis, amyloidosis and allograft rejection amongst many others. 2xallograft biopsies on 2/16 & 2/20 remain negative for any significant pathology. Notably, both biopsies taken after rejection therapies implemented which may have reduced areas of graft damage.    - DSAs remain negative; however, patient may have non-HLA antibodies present. 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   - Negative CAV & CAD via LHC on 2/18/24   - Completed methylpred steroid pulse w/ 1g Q24 x 3 days (2/16-2/18) and prednisone taper   - Thymoglobulin doses: 2/18 & 2/19   - IVIG + PLEX Session: 2/18, 2/20, 3/7, 3/11 and 3/13    - Right femoral VA ECMO flowing 3.5L w/ FIO2 90% and sweep 0.5  - Right axillary impella for LV venting remains in place and set P2 w/ flows of 1.2  - Extubated 4/1/24 to NC   - LFTs improving s/p ECMO cannulation with improving hemolytic labwork s/p impella wean    - Concerns for surgical intervention remain, including: deconditioning, multiorgan failure, lack of diagnosis causing graft failure, possible rejection despite appropriate immunotherapy, and optimal immunotherapy plan if re-transplanted.     Transplant Status:  - Status 1 for heart-kidney (listed in UNOS on 4/2/24)  - ABO status: O+  - CMV+/EBV+/Toxo-/Hep B-/Hep C-  - Prospective cross match with every donor offer  - Due to potential risk of hyperacute allograft rejection, induction plan will be 500mg solumedrol x 2 (followed by steroid taper) and thymoglobulin     Recommendations:  - Increase tacrolimus to 3.5mg BID starting this evening   - Agree with negative fluid status w/ goal 500-1L negative in 24hrs   - Follow repeat BCx2 obtained on 4/3/24 (remain NGTD) and continue zosyn for now.   - Low threshold to send sputum culture if any respiratory symptoms.    - Agree with increasing systemic heparin for MCS/DVTs and SFA anticoagulation   - Please avoid blood product transfusions if possible. Goal Hg > 7.0 and platelets > 25 unless active  bleeding.   - Please obtain repeat upper extremity DVTs scans. If central veins show resolution of DVTs, would recommend line holiday by utilizing ECMO circuit for CVVH. No need for new central line prior to removal of iHD line if hemodynamically stable.     Continue excellent care per CTICU team.      Patient was examined and discussed with Advanced Heart Failure and Transplant Cardiology attending physician, Dr. Kendrick Jain, APRN-CNP

## 2024-04-04 NOTE — PROGRESS NOTES
CTICU Progress Note  Charla Bowles/[id number]    Admit Date: 2/15/2024  Hospital Length of Stay: 49   ICU Length of Stay: 7d 17h   CT SURGEON: Dr. Bright    SUBJECTIVE:   Remains on VA ECMO ~ 3.5L/SW 0.5/100% and Impella 5.5 P3 1.6L.  Remains on Room air.   CVVH; net - 500 mL.     MEDICATIONS  Infusions:  [Held by provider] dexmedeTOMIDine, Last Rate: Stopped (04/03/24 0825)  heparin, Last Rate: 500 Units/hr (04/04/24 1200)  lactated Ringer's, Last Rate: 5 mL/hr (04/04/24 1200)  PrismaSol 4/2.5  sodium bicarbonate infusion Impella Purge 25 mEq/1000 mL D5W, Last Rate: 10 mL/hr (04/04/24 1200)      Scheduled:  acetaminophen, 650 mg, q6h  calcium carbonate-vitamin D3, 1 tablet, Daily  cyanocobalamin, 500 mcg, Daily  darbepoetin floyd, 100 mcg, q14 days  ferrous sulfate (325 mg ferrous sulfate), 65 mg of iron, Daily with breakfast  folic acid, 1 mg, Daily  insulin lispro, 0-5 Units, TID with meals  levothyroxine, 200 mcg, Daily  lidocaine, 1 patch, Daily  melatonin, 10 mg, Daily  methocarbamol, 500 mg, q6h TRICIA  multivitamin with minerals, 1 tablet, Daily  mycophenolate, 360 mg, BID  nystatin, 5 mL, q6h  oxygen, , Continuous - Inhalation  oxymetazoline, 2 spray, q12h  pantoprazole, 40 mg, Daily  piperacillin-tazobactam, 3.375 g, q8h  predniSONE, 10 mg, Daily  psyllium, 1 packet, Daily  QUEtiapine, 50 mg, Nightly  rosuvastatin, 40 mg, Nightly  sennosides-docusate sodium, 1 tablet, BID  sod phos di, mono-K phos mono, 500 mg, 4x daily  sodium chloride, 1 spray, 4x daily  [Held by provider] sulfamethoxazole-trimethoprim, 80 mg of trimethoprim, Daily  tacrolimus, 3 mg, q12h TRICIA  [START ON 4/5/2024] valGANciclovir, 450 mg, q48h      PRN:  bisacodyl, 10 mg, Daily PRN  calcium gluconate, 1 g, q6h PRN  calcium gluconate, 2 g, q6h PRN  dextrose, 25 g, q15 min PRN  dextrose, 25 g, q15 min PRN   Or  glucagon, 1 mg, q15 min PRN  hydrALAZINE, 5 mg, q4h PRN  HYDROmorphone, 0.2 mg, q4h PRN  hydrOXYzine HCL, 25 mg, q6h  "PRN  ipratropium-albuteroL, 3 mL, q6h PRN  artificial tears, 2 drop, PRN  magnesium sulfate, 2 g, q6h PRN  magnesium sulfate, 4 g, q6h PRN  microfibrllar collagen, , PRN  mupirocin, , BID PRN  naloxone, 0.2 mg, q5 min PRN  ondansetron, 4 mg, q4h PRN  oxyCODONE, 5 mg, q4h PRN  oxygen, , Continuous PRN - O2/gases  sodium chloride, 1 spray, 4x daily PRN        PHYSICAL EXAM:   Visit Vitals  /89 Comment: Post: 106/78   Pulse (!) 127   Temp 37 °C (98.6 °F) (Temporal)   Resp 25   Ht 1.549 m (5' 0.98\")   Wt 90.3 kg (199 lb 1.2 oz)   SpO2 100%   BMI 37.63 kg/m²   OB Status Hysterectomy   Smoking Status Never   BSA 1.97 m²     Wt Readings from Last 5 Encounters:   04/01/24 90.3 kg (199 lb 1.2 oz)   12/07/23 92.1 kg (203 lb)   12/01/23 93 kg (205 lb)   11/29/23 92.9 kg (204 lb 12.8 oz)   11/09/23 91.3 kg (201 lb 3.2 oz)     INTAKE/OUTPUT:  I/O last 3 completed shifts:  In: 1635.3 (18.1 mL/kg) [P.O.:200; I.V.:835.3 (9.3 mL/kg); IV Piggyback:600]  Out: 1973 (21.8 mL/kg) [Other:1973]  Weight: 90.3 kg      Physical Exam:   - CONSTITUTION: Young appearing female intubated on ECMO with impella  - NEUROLOGIC: CAM negative. Following in all extremities. Alert and oriented.   - CARDIOVASCULAR: ST, R fem VA ECMO, no bleeding at site. R axillary impella without bleeding. Monophasic signals in LE  - RESPIRATORY: Room air. Course breath sounds.   - GI: Soft, non-tender,  active BS.  - : Anuric, on CVVH  - EXTREMITIES: Warm and dry. Edema improving  - SKIN: Left groin area of breakdown  - PSYCHIATRIC: Calm and cooperative.     Daily Risk Screen  Central line: access  Singh: n/s    Images: Reviewed.     Invasive Hemodynamics:    Most Recent Range Past 24hrs   BP (Art) 100/81 Arterial Line BP 1  Min: 92/70  Max: 113/94   MAP(Art) 87 mmHg Arterial Line MAP 1 (mmHg)   Min: 77 mmHg  Max: 101 mmHg   RA/CVP   No data recorded   PA 41/34 No data recorded   PA(mean) 37 mmHg No data recorded   CO 5.5 L/min No data recorded   CI 2.8 L/min/m2 " No data recorded   Mixed Venous 89.6 % SVO2 (%)  Min: 64.8 %  Max: 95.4 %   SVR  1115 (dyne*sec)/cm5 No data recorded     Impella:      Most Recent Range Past 24hrs   Performance Level 2 P Level  Min: 2   Min taken time: 04/04/24 1200  Max: 4   Max taken time: 04/03/24 1400   Flow (L/min) 1 Flow (L/min)  Min: 0.8   Min taken time: 04/04/24 0200  Max: 1.4   Max taken time: 04/04/24 0800   Motor Current 141/76 Motor Current  Min: 117/75   Min taken time: 04/04/24 0800  Max: 190/93   Max taken time: 04/03/24 1800   Placement Signal Yes  Placement OK could not be evaluated. This SmartLink does not work with rows of the type: Custom List   Purge (mmHg) 483 Purge Pressure (mmHg)  Min: 401   Min taken time: 04/04/24 0300  Max: 642   Max taken time: 04/04/24 0400   Purge rate (mL/hr) 11.3 Purge Rate (mL/hr)  Min: 11.1   Min taken time: 04/04/24 0500  Max: 12   Max taken time: 04/04/24 1000      ECMO:     Most Recent Range Past 24hrs   Flow 3.69 Patient Flow (L/min)  Min: 3.42  Max: 3.69   Speed 3190 Circuit Flow (RPM)  Min: 3189  Max: 3191   Sweep 0.5 Sweep Gas Flow Rate (L/min)  Min: 0.5  Max: 0.5          Assessment/Plan   34F with a PMHx sig for stage D HFrEF (PPCM) s/p ICD s/p CardioMEMs, hypothyroidism 2/2 thyroidectomy, obesity s/p gastric bypass (2017), and SLE who is s/p OHT (3/31/2022) with a post-OHT course complicated by RIJ/RUE DVTs, leukopenia, left groin seroma, and asymptomatic 2R ACR (11/2022) treated with steroids, s/p total hysterectomy (2023), Flu A (1/2024).     Originally presented to [facility] ED on 2/15/24 with complaints of N/V x 3 days and inability to keep medications down. Found to have acute systolic heart failure with primary graft failure and cardiogenic shock. Treated for suspected acute cellular vs. acute antibody mediated rejection; however, multiple cardiac biopsies negative for signs of rejection or other causes of graft failure. Course has been complicated by multiple MCS devices for  refractory cardiogenic shock (right femoral IABP: 2/18/24 - 3/1/24, Left femoral VA ECMO: 2/19/24 - 2/29/24, Right axillary impella 5.5: 3/1/24 - remains in place, 2nd right femoral VA ECMO: 3/27/24 - remains in place), ARF requiring RRT, acute liver injury, intubation due to volume overload in setting of pulmonary edema, severe hemolysis 2/2 to impella malposition, mild CMV viremia, bilateral provoked IJ DVTs, multiple episodes of epistaxis & coagulopathies requiring ongoing blood product transfusions.        Transferred to CTICU on 3/27/24 following right femoral VA ECMO placement due to worsening cardiogenic shock and multi-organ failure despite Impella 5.5 support and multiple inotropes.       Plan:  NEURO: No history. Previously neuro intact with acute pain and ongoing insomnia, received Seroquel and precedex overnight, improved sleep, however unclear if Seroquel alone could've been beneficial. Cam negative, Alert and oriented. Sat on edge of bed and stood at bedside with PT yesterday.  ->  - Serial neuro and pain assessments  - continue tylenol scheduled but will stop again if LFT uptrend.  - continue lidoderm patch for back pain  - PRN oxy 5mg Q4    - PRN dilaudid 0.2 mg q4hr   - Continue melatonin 10 mg HS (5 mg @ 1800, 5 mg @ 2100)  - Continue Seroquel 50 mg HS   - Can start precedex at 2300 if not sleeping  - PT/OT Consult; Goal sit to stand today  - CAM ICU score qshift. Sleep/wake cycle hygiene     ENT: Multiple episodes of epistaxis with interventions by ENT.  Most recently in OR 3/27 with L nare packed.  Packing removed yesterday, 4/1.   - appreciate ENT recs  - PRN ocean spray and mupiricin for dry nasal passages  - PRN afrin      Cardiac: Patient has a history of heart transplant in March of 2022 with primary graft dysfunction treated for acute cellular and antibody rejection without improvement with negative biopsy with multiple MCS devices for refractory cardiogenic shock (right femoral IABP:  2/18/24 - 3/1/24, Left femoral VA ECMO: 2/19/24 - 2/29/24, Right axillary impella 5.5: 3/1/24 - remains in place, 2nd right femoral VA ECMO: 3/27/24 - remains in place). Elevated LDH improved today on P4. Multiple attempts at repositioning impella previously.  ECHO 3/29 with impella potentially deep but not adjusted again. ECMO ~3.5L flow/100%/sweep 0.5, impella 5.5 P3 with flows ~1.6L with resolved lactate and improving end organ function, tolerating weaning with downtrending LDH. ST 120s.   -->  - >Listed as status 1  - Maintain goal MAP 70-90  - continue full BiV support with ECMO.   - Wean Impella to P2  - Resume home rosuvastatin 40mg daily  - continue PRN IV hydralazine for MAP >90  - trend daily LDH   - Hold home amlodipine    Vascular: 3/27 arterial duplex with proximal SFA occlusin with collateral flow. C/f LLE ischemia (previous ECMO site) with loss of pulses- now monophasic with CK that had been normal and no longer trending. Feet warm with intact motor exam.   - appreciate vascular surgery following  - Vascular Surgery following for L SFA- No intervention needed at this time     PULM: No history. Intubated multiple times throughout hospital course for procedures; intubated 3/27 for OR. Improving acute respiratory failure persisting due to acute pulmonary edema which is now improving after aggressive volume removal. Appropriate peak and plateau pressures on ACVC with peep 10.  Tolerated pressure support trial 3/30 but apneic repeatedly 3/31.  Room air with gradual improvement of pulmonary edema on CXR  -->  - CXR daily  - wean sweep  for normal pH  - Maintain SPO2 > 92%     GI: History of gastric bypass and MMF colitis. Shock liver originally resolved; most recently elevated r/t likely congestive hepatopathy that is improving on ECMO. Abdominal pain improved with reduced myfortic dosing. Previous c/f GI bleed, likely blood from epistaxis; no current evidence of GI bleeding. H pylori negative, fecal  calprotectin (elevated at 380), fecal lactoferrin (Positive 03/23/24). Having Daily BMs.  -->  - Continue calcitriol 0.5mg daily, multivitamin, calcium carbonate 1,250mg BID  - continue PPI  - avoiding placing dobhoff with epistaxis.   - Continue Diet- Carb controlled, Cardiac  - Continue Ensure Clear TID  - continue miralax BID & senna-colace BID    : No history, baseline Cr 1.2-1.3. HIRAM originally on CVVH then with renal recovery but then again worsened and now dialysis dependent and failed diuretic challenges. Hypervolemia improved with aggressive volume removal. Persisting hypophosphatemia despite repeated supplementation. Last 24 hours -600 mL. Attempted SLED overnight, machine malfunctioning, CVVH restarted.  -->  - RFP as clinically indicated, replete electrolytes per CTICU protocol.   - Continue CVVH with goal removal -500 ml   - Continue Sodium Phos 500 mg 4x daily  - Nephrology Following     ENDO: History of hypothyroidism and prediabetes. TSH elevated originally to malabsorption then with dosing adjusted by endo; TSH (3/17): 20.74, T4 1.11, T3: 1.4. Steroid induced hyperglycemia acceptable glycemic control on SSI.  -->  - Maintain BG <180 with hypoglycemia protocol  - Continue SSI #1 AC/HS   - Continue Synthroid 200mcg daily.   - Endocrine following     HEME: History of iron deficiency anemia and remote DVT; again with acute DVT LIJ and SVT left cephalic vein and right cephalic vein. Acute blood loss anemia with repeated transfusions with significant hemolysis but no evidence of active bleeding; last received pRBC 3/30. Stable thrombocytopenia persisting and again downtrending; last PF4 negative 3/30.  No epistaxis today.  -->    - Continue ferrous sulfate daily  - holding ASA for CAD with thrombocytopenia  - continue bicarb purge  - Increase systemic heparin 500 units/hr and evaluate response. High risk with DVT and SFA occlusion but high risk for bleeding.   - SCDs and for DVT prophylaxis  - Aranesp  every 2 weeks  - Last type and screen: 4/3  - Avoid unnecessarily transfusing, HGB > 7.0  - Bilateral upper DVT scan      Rejection/prophylaxis: S/p heart transplant 3/31/2022. Induction basiliximab. Donor HCV -, toxo -/-, CMV -/+. Mild ACR with +CD4 and negative HLAs however clinical presentation concern for acute cellular vs AMR rejection/stuttering rejection completed courses of thymo/PLEX/IVIG without clinical improvement.   - Steroids: Continue PO prednisone 10 mg daily  - Tacrolimus: 3.0 mg BID w/ daily levels drawn @ 0600  - Tacrolimus goal troughs: 8-10  - Continue Myfortic acid: 360mg BID.  Not starting MMF d/t hx of colitis  - Antifungals: nystatin 500,000units Q6  - Antivirals: valcyte 450mg Q48hrs   - Anti PCP & Toxoplasmosis: holding Bactrim SS daily due to thrombocytopenia     ID: Afebrile. C/f previous yeast infection. BC 3/27 NGTD final. MRSA screen negative 3/28. Episode of hypotension on 4/1, pan cultured and empiric Vanco/Zosyn started.  Patient was shivering 4/3, concern for risk of infection.  Blood cultures sent and zosyn restarted.   -->  - Trend temp q4h  - Restarted zosyn (4/1- )  - Follow up cxs     Skin: Left groin wound from previous ECMO with wound consulted. Wound cx with NG 3/15.   - Preventative Mepilex dressings in place on sacrum and heels  - Change preventative Mepilex weekly or more frequently as indicated (when moist/soiled)   - Every shift skin assessment per nursing and weekly ICU skin rounds  - Moisture barrier to be applied with christopher care  - Active skin problems addressed with nursing on daily rounds  - wound recs from note 3/15 ordered      Proph:  SCDs  low systemic heparin  PPI     G: Lines  RIJ Trialysis - 3/5. -> Pending LIJ scan, if vessel appears promising for Trialysis line, will remove current trialysis line and give a line holiday  R radial maxwell 3/27  PIV x2     Restraints: Not indicated     Code status: Full Code     I personally spent 45 minutes of critical care  time directly and personally managing the patient exclusive of separately billable procedures.     A,B,C,D,E,F,G: reviewed  Discussed with Dr. Eckert.       Dispo: CTICU care for now.    CTICU TEAM PHONE [phone]

## 2024-04-04 NOTE — PROGRESS NOTES
Occupational Therapy    Occupational Therapy Treatment    Name: Charla Bowles  MRN: [id number]  : 1990  Date: 24  Time Calculation  Start Time: 1251  Stop Time: 1331  Time Calculation (min): 40 min    Assessment:  End of Session Communication: Physician, Bedside nurse  End of Session Patient Position: Bed, 3 rail up, Alarm off, caregiver present    Plan:  Treatment Interventions: ADL retraining, Functional transfer training, Endurance training, UE strengthening/ROM, Cognitive reorientation, Patient/family training, Equipment evaluation/education, Neuromuscular reeducation, Fine motor coordination activities, Compensatory technique education  OT Frequency: 5 times per week  OT Discharge Recommendations: High intensity level of continued care  Equipment Recommended upon Discharge:  (TBD)  OT Recommended Transfer Status: Assist of 2  OT - OK to Discharge: Yes    Subjective   General:  OT Last Visit  OT Received On: 24  Co-Treatment: PT  Co-Treatment Reason: Pt on ECMO requiring skilled 2 person assist for safe mobility  Prior to Session Communication: Bedside nurse, PCT/NA/CTA  Patient Position Received: Bed, 3 rail up, Alarm off, not on at start of session  Family/Caregiver Present: No  General Comment: Pt supine in bed on arrival, drowsy but agreeable. ECMO site and impella secured and stable at start and end of session.  PT, OT, RN, perfusion, and CNP present throughout session.  Stable ECMO numbers with mobility. R axillary impella (P-2), flow 1.2 (1.3 post). R fem VA ECMO 90% FiO2, sweep 500, flow 3.7 (3.55 post).     Precautions:  Hearing/Visual Limitations: WFL  Medical Precautions: Cardiac precautions, Fall precautions  Precautions Comment: R axillary impella precautions- no pushing/pulling with R UE, No lifting R UE above shoulder height, no R UE humeral EXT past plane of body, R femoral ECMO precautions, MAP 70-90, protective precautions    Vitals:  Vital Signs  Heart Rate: (!) 128  (130s with activity)  Resp: 21  SpO2: 100 %  BP: 97/75  MAP (mmHg): 81  Lines/Tubes/Drains:  ECMO Cannulation Peripheral Right;Femoral artery;Femoral vein (Active)   Number of days: 7       Arterial Line 03/27/24 Right Radial (Active)   Number of days: 8       Ventricular Assist Device Impella 5.5 (Active)   Number of days: 33       Hemodialysis Cath Triple lumen Right Jugular (Active)   Number of days: 25       Cognition:  Overall Cognitive Status: Within Functional Limits  Arousal/Alertness: Delayed responses to stimuli  Orientation Level: Oriented X4  Cognition Comments: More drowsy/fatigued this session compared to previous sessions.  Safety/Judgement: Exceptions to WFL  Insight: Moderate  Processing Speed: Delayed    Pain Assessment:  Pain Assessment  Pain Assessment:  (reported unrated back pain throughout)     Objective    Bed Mobility/Transfers:     Bed Mobility  Bed Mobility: Yes  Bed Mobility 1  Bed Mobility 1: Supine to sitting  Level of Assistance 1: Dependent (x2)  Bed Mobility Comments 1: HOB elevated ~60 degrees, draw sheet, +1 assist to manage ECMO and impella  Bed Mobility 2  Bed Mobility  2: Sitting to supine  Level of Assistance 2: Dependent (x2)  Bed Mobility Comments 2: draw sheet, +1 assist to manage ECMO/impella    Transfers  Transfer: Yes  Transfer 1  Transfer From 1: Sit to, Stand to  Transfer to 1: Sit, Stand  Transfer Device 1:  (Arm in arm on L side, holding around pts waist to avoid pulling on RUE with impella)  Transfer Level of Assistance 1: Maximum assistance (x2 assist)  Trials/Comments 1: x2 trials, achieved ~75% stand. Pt with flexed hips/trunk, LEs propped against bed.    Therapy/Activity:      Therapeutic Activity  Therapeutic Activity Performed: Yes  Therapeutic Activity 1: Increased time for skilled ICU line/tube management for safe  mobility. Increased time at start and end of session for room set up and take down d/t goal of getting pt into a chair. Unable to progress to  chair safely this date 2/2 fatigue/weakness.  Therapeutic Activity 2: Pt sat EOB ~12 minutes with primarily CGA-min A for sitting balance. Progressed to Mod A for sitting balance for last ~2 minutes with left lateral lean possibly 2/2 fatigue.  Therapeutic Activity 3: HOB elevated to ~60 degrees prior to OOB transfer to ensure ECMO stability      Outcome Measures:  [facility] Daily Activity  Putting on and taking off regular lower body clothing: Total  Bathing (including washing, rinsing, drying): A lot  Putting on and taking off regular upper body clothing: A little  Toileting, which includes using toilet, bedpan or urinal: Total  Taking care of personal grooming such as brushing teeth: A little  Eating Meals: A little  Daily Activity - Total Score: 13     Education Documentation  Handouts, taught by Marcia Thomason OT at 4/4/2024  4:05 PM.  Learner: Patient  Readiness: Acceptance  Method: Explanation, Demonstration  Response: Verbalizes Understanding    Body Mechanics, taught by Marcia Thomason OT at 4/4/2024  4:05 PM.  Learner: Patient  Readiness: Acceptance  Method: Explanation, Demonstration  Response: Verbalizes Understanding    Precautions, taught by Marcia Thomason OT at 4/4/2024  4:05 PM.  Learner: Patient  Readiness: Acceptance  Method: Explanation, Demonstration  Response: Verbalizes Understanding    Home Exercise Program, taught by Marcia Thomason OT at 4/4/2024  4:05 PM.  Learner: Patient  Readiness: Acceptance  Method: Explanation, Demonstration  Response: Verbalizes Understanding    ADL Training, taught by Marcia Thomason OT at 4/4/2024  4:05 PM.  Learner: Patient  Readiness: Acceptance  Method: Explanation, Demonstration  Response: Verbalizes Understanding    Education Comments  No comments found.        Goals:  Encounter Problems       Encounter Problems (Active)       ADLs       Patient will complete daily grooming tasks in standing with LRAD with  supervision level of assistance and PRN adaptive equipment. (Progressing)       Start:  03/01/24    Expected End:  04/16/24               ADLs       Patient with complete lower body dressing with stand by assist level of assistance donning and doffing all LE clothes  with PRN adaptive equipment (Not met)       Start:  03/04/24    Expected End:  03/18/24    Resolved:  04/02/24    Updated to: Patient with complete UB bathing with stand by assist level of assistance  with PRN adaptive equipment    Update reason: re eval          Patient will complete toileting including hygiene clothing management/hygiene with stand by assist level of assistance. (Not met)       Start:  03/04/24    Expected End:  03/18/24    Resolved:  04/02/24    Updated to: Patient will complete upper body dressing including with mod I level of assistance.    Update reason: re eval         Patient with complete UB bathing with stand by assist level of assistance  with PRN adaptive equipment (Progressing)       Start:  04/02/24    Expected End:  04/16/24                Patient will complete upper body dressing including with mod I level of assistance. (Progressing)       Start:  04/02/24    Expected End:  04/16/24                   BALANCE       Pt will increase dynamic standing tolerance to >10 min with supervision using LRAD during functional mobility/ADLs without LOB in order to improve activity tolerance and balance for self-care tasks.  (Progressing)       Start:  03/01/24    Expected End:  04/16/24               COGNITION/SAFETY       Patient will participate in cognitive activities to demonstrate WFL score on further cognitive assessments, including Medi-Cog, MoCA and remain A&O x3, CAM (-).  (Progressing)       Start:  03/01/24    Expected End:  04/16/24               EXERCISE/STRENGTHENING       Patient will be educated on BUE HEP for increased ADL/IADL performance. (Progressing)       Start:  03/01/24    Expected End:  04/16/24                MOBILITY       Patient will perform Functional mobility max Household distances/Community Distances with supervision level of assistance and least restrictive device in order to improve safety and functional mobility. (Progressing)       Start:  03/01/24    Expected End:  04/16/24 04/04/24 at 4:06 PM   Marcia Thomason, OT   [phone]

## 2024-04-04 NOTE — PROGRESS NOTES
Physical Therapy    Physical Therapy Treatment    Patient Name: Charla Bowles  MRN: [id number]  Today's Date: 4/4/2024  Time Calculation  Start Time: 1250  Stop Time: 1331  Time Calculation (min): 41 min       Assessment/Plan   PT Assessment  PT Assessment Results: Decreased strength, Decreased endurance, Impaired balance, Decreased mobility  Rehab Prognosis: Good  Evaluation/Treatment Tolerance: Patient tolerated treatment well  Medical Staff Made Aware: Yes  End of Session Communication: Bedside nurse, Physician  End of Session Patient Position: Bed, 3 rail up  PT Plan  Inpatient/Swing Bed or Outpatient: Inpatient  PT Plan  Treatment/Interventions: Bed mobility, Transfer training, Gait training, Balance training, Neuromuscular re-education, Strengthening, Endurance training, Therapeutic exercise, Therapeutic activity  PT Plan: Skilled PT  PT Frequency: 5 times per week  PT Discharge Recommendations: High intensity level of continued care  PT Recommended Transfer Status: Assist x2  PT - OK to Discharge: Yes      General Visit Information:   PT  Visit  PT Received On: 04/04/24  Response to Previous Treatment: Patient with no complaints from previous session.  General  Family/Caregiver Present: No  Co-Treatment: OT  Co-Treatment Reason: Pt on ECMO requiring skilled 2 person assist for safe mobility  Prior to Session Communication: Bedside nurse, PCT/NA/CTA  Patient Position Received: Bed, 3 rail up, Alarm off, not on at start of session  Preferred Learning Style: auditory, verbal  General Comment: Pt awake, alert and willing to participate in PT session.  Pt completed bed mobility, sit<>stand transfer.  Attempted to complete bed>chair transfer however unable to complete d/t weakness. ECMO site and impella secured and stable at start and end of session.  PT, OT, RN, perfusion, and CNP present throughout session.  Stable ECMO numbers with mobility. (Impella: Flow-1.2, P-2; ECMO: Flow-3.75/3.55, Sweep-500,  FiO2-90%)    Subjective   Precautions:  Precautions  Medical Precautions: Cardiac precautions, Fall precautions  Precautions Comment: R axillary impella precautions- no pushing/pulling with R UE, No lifting R UE above shoulder height, no R UE humeral EXT past plane of body, R femoral ECMO precautions, MAP 70-90, protective precautions  Vital Signs:  Vital Signs  Heart Rate: (!) 128  Heart Rate Source: Monitor  Resp: 16  SpO2: 100 %  BP: 97/75  MAP (mmHg): 82  BP Location: Right arm  BP Method: Arterial line  Patient Position: Lying    Objective   Pain:  Pain Assessment  Pain Assessment:  (Pt reported back pain however unrated)  Cognition:  Cognition  Overall Cognitive Status: Within Functional Limits  Arousal/Alertness: Appropriate responses to stimuli  Orientation Level: Oriented X4  Following Commands: Follows one step commands with increased time  Activity Tolerance:  Activity Tolerance  Endurance: Tolerates 10 - 20 min exercise with multiple rests  Early Mobility/Exercise Safety Screen: Proceed with mobilization - No exclusion criteria met  Treatments:  Therapeutic Activity  Therapeutic Activity Performed: Yes  Therapeutic Activity 1: Increased time for skilled ICU line/tube management for safe functional mobility.  Increased time at start and end of session for room set up and take down d/t goal of getting pt into a chair.  RN assisted with CVVH line management, perfusionist managed ECMO and impella line.  Therapeutic Activity 2: Pt sat EOB ~12 min in total with varying assistance.  Pt requiring CGA-minAx1 throughout majority of sitting however towards end of sitting bout, pt requiring modAx1 d/t L lateral lean possible d/t fatigue. Pt reported LBP on R side with sitting.  Therapeutic Activity 3: HOB elevated to ~60 degrees prior to OOB transfer to ensure ECMO stability with change in position    Bed Mobility  Bed Mobility: Yes  Bed Mobility 1  Bed Mobility 1: Supine to sitting  Level of Assistance 1: Dependent  (x2)  Bed Mobility Comments 1: HOB elevated, draw sheet utilized  Bed Mobility 2  Bed Mobility  2: Sitting to supine  Level of Assistance 2: Dependent (x2)  Bed Mobility Comments 2: HOB flat, draw sheet utilized  Bed Mobility 3  Bed Mobility 3:  (Boost towards HOB)  Level of Assistance 3: Dependent (x2)  Bed Mobility Comments 3: HOB flat, draw sheet utilized    Ambulation/Gait Training  Ambulation/Gait Training Performed:  (Unable to complete this date d/t weakness)  Transfers  Transfer: Yes  Transfer 1  Transfer From 1: Sit to, Stand to  Transfer to 1: Sit, Stand  Technique 1: Sit to stand, Stand to sit  Transfer Device 1:  (Arm in arm on L side, OT holding around pts waist to avoid pulling on RUE with impella)  Transfer Level of Assistance 1: Maximum assistance (x2)  Trials/Comments 1: x2 trials completed from EOB;  Only able to achieve ~75% full stand this date. (Pt with flexed hips and trunk however legs straight pushing against EOB.  Cues to bring hips forward and lift trunk however pt unable to complete.  Attempted to complete 2nd stand however same posture demonstrated.)    Outcome Measures:  [facility] Basic Mobility  Turning from your back to your side while in a flat bed without using bedrails: A lot  Moving from lying on your back to sitting on the side of a flat bed without using bedrails: A lot  Moving to and from bed to chair (including a wheelchair): Total  Standing up from a chair using your arms (e.g. wheelchair or bedside chair): A lot  To walk in hospital room: Total  Climbing 3-5 steps with railing: Total  Basic Mobility - Total Score: 9    FSS-ICU  Ambulation: Unable to attempt due to weakness  Rolling: Total assistance (performs 25% or requires another person)  Sitting: Minimal assistance (performs 75% or more of task)  Transfer Sit-to-Stand: Maximal assistance (performs 25% - 49% of task)  Transfer Supine-to-Sit: Total assistance (performs 25% or requires another person)  Total Score:  8    Education Documentation  Precautions, taught by Michelle Lee PT at 4/4/2024  3:02 PM.  Learner: Patient  Readiness: Acceptance  Method: Explanation  Response: Verbalizes Understanding, Demonstrated Understanding    Body Mechanics, taught by Michelle Lee PT at 4/4/2024  3:02 PM.  Learner: Patient  Readiness: Acceptance  Method: Explanation  Response: Verbalizes Understanding, Demonstrated Understanding    Mobility Training, taught by Michelle Lee PT at 4/4/2024  3:02 PM.  Learner: Patient  Readiness: Acceptance  Method: Explanation  Response: Verbalizes Understanding, Demonstrated Understanding    Education Comments  No comments found.    EDUCATION:       Encounter Problems       Encounter Problems (Active)       Balance       Pt will demonstrate ability to complete sitting static/dynamic balance activities with unilateral UE support and no LOB for increase in safety up D/C.  (Progressing)       Start:  04/02/24    Expected End:  04/16/24               Mobility       Pt will demonstrated ability to complete 5 lateral side steps with modAx1, LRAD, proper form and no balance deficits for safe DC. (Progressing)       Start:  04/02/24    Expected End:  04/16/24            Pt will demonstrate ability to complete ther ex activities to improve functional strength for increase in independence upon DC.  (Progressing)       Start:  04/02/24    Expected End:  04/16/24               PT Transfers       Pt will demonstrated ability to complete bed mobility and sit<>stand transfers with mod assistance x2 and use of assistive device to safely DC. (Progressing)       Start:  04/02/24    Expected End:  04/16/24

## 2024-04-04 NOTE — PROGRESS NOTES
"        INPATIENT TRANSPLANT NEPHROLOGY PROGRESS NOTE          REASON FOR CONSULT:  Immunosuppressive medication management and nephrology related issues.    SUBJECTION:  CVVH Procedure note    ECMO  Unable to perform SLED overnight due to technical problems with dialysis machine so she was restarted on CVVH 4K  Remains on room air.     PHYCISCAL EXAMINATION:    Visit Vitals  /89 Comment: Post: 106/78   Pulse (!) 119   Temp 37 °C (98.6 °F) (Temporal)   Resp (!) 33   Ht 1.549 m (5' 0.98\")   Wt 90.3 kg (199 lb 1.2 oz)   SpO2 100%   BMI 37.63 kg/m²   OB Status Hysterectomy   Smoking Status Never   BSA 1.97 m²        04/02 1900 - 04/04 0659  In: 1635.3 [P.O.:200; I.V.:835.3]  Out: 1973      Weight change:     Constitutional:       General: She is not in acute distress.  Pulmonary:      Effort: Pulmonary effort is normal. No respiratory distress.      Breath sounds: Normal breath sounds.   Chest:      Comments: Right axillary impella site CDI   Abdominal:      General: Bowel sounds are normal.      Palpations: Abdomen is soft.      Tenderness: There is no abdominal tenderness.   Musculoskeletal:      -No LE edema     General: Skin is warm and dry.      Comments: Left groin ECMO cannulation site with drainage    Neurological:      General: No focal deficit present.      Mental Status: She is alert and oriented to person, place, and time.   Psychiatric:         Behavior: Behavior normal. Behavior is cooperative.     MEDICATION LIST: REVIEWED    acetaminophen, 650 mg, q6h  calcium carbonate-vitamin D3, 1 tablet, Daily  cyanocobalamin, 500 mcg, Daily  darbepoetin floyd, 100 mcg, q14 days  ferrous sulfate (325 mg ferrous sulfate), 65 mg of iron, Daily with breakfast  folic acid, 1 mg, Daily  insulin lispro, 0-5 Units, TID with meals  levothyroxine, 200 mcg, Daily  lidocaine, 1 patch, Daily  melatonin, 10 mg, Daily  methocarbamol, 500 mg, q6h TRICIA  multivitamin with minerals, 1 tablet, Daily  mycophenolate, 360 mg, " BID  nystatin, 5 mL, q6h  oxygen, , Continuous - Inhalation  oxymetazoline, 2 spray, q12h  pantoprazole, 40 mg, Daily  piperacillin-tazobactam, 3.375 g, q8h  predniSONE, 10 mg, Daily  psyllium, 1 packet, Daily  QUEtiapine, 50 mg, Nightly  rosuvastatin, 40 mg, Nightly  sennosides-docusate sodium, 1 tablet, BID  sod phos di, mono-K phos mono, 500 mg, 4x daily  sodium chloride, 1 spray, 4x daily  [Held by provider] sulfamethoxazole-trimethoprim, 80 mg of trimethoprim, Daily  tacrolimus, 3 mg, q12h TRICIA  [START ON 4/5/2024] valGANciclovir, 450 mg, q48h      [Held by provider] dexmedeTOMIDine, Last Rate: Stopped (04/03/24 0825)  heparin, Last Rate: 500 Units/hr (04/04/24 0900)  lactated Ringer's, Last Rate: 5 mL/hr (04/04/24 0900)  PrismaSol 4/2.5  sodium bicarbonate infusion Impella Purge 25 mEq/1000 mL D5W, Last Rate: 10 mL/hr (04/04/24 0900)      bisacodyl, 10 mg, Daily PRN  calcium gluconate, 1 g, q6h PRN  calcium gluconate, 2 g, q6h PRN  dextrose, 25 g, q15 min PRN  dextrose, 25 g, q15 min PRN   Or  glucagon, 1 mg, q15 min PRN  hydrALAZINE, 5 mg, q4h PRN  HYDROmorphone, 0.2 mg, q4h PRN  hydrOXYzine HCL, 25 mg, q6h PRN  ipratropium-albuteroL, 3 mL, q6h PRN  artificial tears, 2 drop, PRN  magnesium sulfate, 2 g, q6h PRN  magnesium sulfate, 4 g, q6h PRN  microfibrllar collagen, , PRN  mupirocin, , BID PRN  naloxone, 0.2 mg, q5 min PRN  ondansetron, 4 mg, q4h PRN  oxyCODONE, 5 mg, q4h PRN  oxygen, , Continuous PRN - O2/gases  sodium chloride, 1 spray, 4x daily PRN        ALLERGY:  Allergies   Allergen Reactions    Dapagliflozin GI bleeding and Bleeding     UTI    Urinary tract infection    Empagliflozin Unknown    Myfortic [Mycophenolate Sodium] GI Upset    Topiramate Nausea Only and Nausea/vomiting    Latex Rash       LABS:  Results for orders placed or performed during the hospital encounter of 02/15/24 (from the past 24 hour(s))   POCT GLUCOSE   Result Value Ref Range    POCT Glucose 138 (H) 74 - 99 mg/dL   Calcium,  Ionized   Result Value Ref Range    POCT Calcium, Ionized 1.03 (L) 1.1 - 1.33 mmol/L   CBC   Result Value Ref Range    WBC 6.1 4.4 - 11.3 x10*3/uL    nRBC 2.0 (H) 0.0 - 0.0 /100 WBCs    RBC 2.63 (L) 4.00 - 5.20 x10*6/uL    Hemoglobin 7.4 (L) 12.0 - 16.0 g/dL    Hematocrit 23.1 (L) 36.0 - 46.0 %    MCV 88 80 - 100 fL    MCH 28.1 26.0 - 34.0 pg    MCHC 32.0 32.0 - 36.0 g/dL    RDW 23.0 (H) 11.5 - 14.5 %    Platelets 86 (L) 150 - 450 x10*3/uL   Renal Function Panel   Result Value Ref Range    Glucose 147 (H) 74 - 99 mg/dL    Sodium 135 (L) 136 - 145 mmol/L    Potassium 4.3 3.5 - 5.3 mmol/L    Chloride 101 98 - 107 mmol/L    Bicarbonate 23 21 - 32 mmol/L    Anion Gap 15 10 - 20 mmol/L    Urea Nitrogen 11 6 - 23 mg/dL    Creatinine 1.00 0.50 - 1.05 mg/dL    eGFR 76 >60 mL/min/1.73m*2    Calcium 7.9 (L) 8.6 - 10.6 mg/dL    Phosphorus 2.1 (L) 2.5 - 4.9 mg/dL    Albumin 4.3 3.4 - 5.0 g/dL   Blood Gas Arterial Full Panel   Result Value Ref Range    POCT pH, Arterial 7.37 (L) 7.38 - 7.42 pH    POCT pCO2, Arterial 34 (L) 38 - 42 mm Hg    POCT pO2, Arterial 104 (H) 85 - 95 mm Hg    POCT SO2, Arterial 100 94 - 100 %    POCT Oxy Hemoglobin, Arterial 95.7 94.0 - 98.0 %    POCT Hematocrit Calculated, Arterial 24.0 (L) 36.0 - 46.0 %    POCT Sodium, Arterial 132 (L) 136 - 145 mmol/L    POCT Potassium, Arterial 4.2 3.5 - 5.3 mmol/L    POCT Chloride, Arterial 101 98 - 107 mmol/L    POCT Ionized Calcium, Arterial 1.07 (L) 1.10 - 1.33 mmol/L    POCT Glucose, Arterial 145 (H) 74 - 99 mg/dL    POCT Lactate, Arterial 0.9 0.4 - 2.0 mmol/L    POCT Base Excess, Arterial -5.0 (L) -2.0 - 3.0 mmol/L    POCT HCO3 Calculated, Arterial 19.7 (L) 22.0 - 26.0 mmol/L    POCT Hemoglobin, Arterial 8.1 (L) 12.0 - 16.0 g/dL    POCT Anion Gap, Arterial 16 10 - 25 mmo/L    Patient Temperature 37.0 degrees Celsius    FiO2 90 %   POCT GLUCOSE   Result Value Ref Range    POCT Glucose 145 (H) 74 - 99 mg/dL   Blood Culture    Specimen: Peripheral Venipuncture;  Blood culture   Result Value Ref Range    Blood Culture Loaded on Instrument - Culture in progress    Blood Culture    Specimen: Peripheral Venipuncture; Blood culture   Result Value Ref Range    Blood Culture Loaded on Instrument - Culture in progress    Blood Gas Arterial Full Panel   Result Value Ref Range    POCT pH, Arterial 7.33 (L) 7.38 - 7.42 pH    POCT pCO2, Arterial 37 (L) 38 - 42 mm Hg    POCT pO2, Arterial 206 (H) 85 - 95 mm Hg    POCT SO2, Arterial 100 94 - 100 %    POCT Oxy Hemoglobin, Arterial 96.2 94.0 - 98.0 %    POCT Hematocrit Calculated, Arterial 21.0 (L) 36.0 - 46.0 %    POCT Sodium, Arterial 133 (L) 136 - 145 mmol/L    POCT Potassium, Arterial 4.0 3.5 - 5.3 mmol/L    POCT Chloride, Arterial 104 98 - 107 mmol/L    POCT Ionized Calcium, Arterial 1.04 (L) 1.10 - 1.33 mmol/L    POCT Glucose, Arterial 115 (H) 74 - 99 mg/dL    POCT Lactate, Arterial 0.6 0.4 - 2.0 mmol/L    POCT Base Excess, Arterial -5.9 (L) -2.0 - 3.0 mmol/L    POCT HCO3 Calculated, Arterial 19.5 (L) 22.0 - 26.0 mmol/L    POCT Hemoglobin, Arterial 6.9 (L) 12.0 - 16.0 g/dL    POCT Anion Gap, Arterial 14 10 - 25 mmo/L    Patient Temperature 37.0 degrees Celsius    FiO2 28 %   Reticulocytes   Result Value Ref Range    Retic % 4.8 (H) 0.5 - 2.0 %    Retic Absolute 0.120 (H) 0.018 - 0.083 x10*6/uL    Reticulocyte Hemoglobin 29 28 - 38 pg    Immature Retic fraction 35.5 (H) <=16.0 %   Haptoglobin   Result Value Ref Range    Haptoglobin <10 (L) 37 - 246 mg/dL   CBC and Auto Differential   Result Value Ref Range    WBC 6.0 4.4 - 11.3 x10*3/uL    nRBC 2.3 (H) 0.0 - 0.0 /100 WBCs    RBC 2.48 (L) 4.00 - 5.20 x10*6/uL    Hemoglobin 6.9 (L) 12.0 - 16.0 g/dL    Hematocrit 22.3 (L) 36.0 - 46.0 %    MCV 90 80 - 100 fL    MCH 27.8 26.0 - 34.0 pg    MCHC 30.9 (L) 32.0 - 36.0 g/dL    RDW 23.1 (H) 11.5 - 14.5 %    Platelets 62 (L) 150 - 450 x10*3/uL    Neutrophils % 73.0 40.0 - 80.0 %    Immature Granulocytes %, Automated 7.7 (H) 0.0 - 0.9 %     Lymphocytes % 3.8 13.0 - 44.0 %    Monocytes % 15.0 2.0 - 10.0 %    Eosinophils % 0.2 0.0 - 6.0 %    Basophils % 0.3 0.0 - 2.0 %    Neutrophils Absolute 4.39 1.20 - 7.70 x10*3/uL    Immature Granulocytes Absolute, Automated 0.46 0.00 - 0.70 x10*3/uL    Lymphocytes Absolute 0.23 (L) 1.20 - 4.80 x10*3/uL    Monocytes Absolute 0.90 0.10 - 1.00 x10*3/uL    Eosinophils Absolute 0.01 0.00 - 0.70 x10*3/uL    Basophils Absolute 0.02 0.00 - 0.10 x10*3/uL   Lactate Dehydrogenase   Result Value Ref Range     (H) 84 - 246 U/L   Calcium, Ionized   Result Value Ref Range    POCT Calcium, Ionized 0.91 (L) 1.1 - 1.33 mmol/L   Heparin Assay, UFH   Result Value Ref Range    Heparin Unfractionated 0.2 See Comment Below for Therapeutic Ranges IU/mL   Morphology   Result Value Ref Range    RBC Morphology See Below     RBC Fragments Few     [place] Cells Few     Basophilic Stippling Present    Renal Function Panel   Result Value Ref Range    Glucose 122 (H) 74 - 99 mg/dL    Sodium 138 136 - 145 mmol/L    Potassium 4.2 3.5 - 5.3 mmol/L    Chloride 101 98 - 107 mmol/L    Bicarbonate 20 (L) 21 - 32 mmol/L    Anion Gap 21 (H) 10 - 20 mmol/L    Urea Nitrogen 18 6 - 23 mg/dL    Creatinine 1.52 (H) 0.50 - 1.05 mg/dL    eGFR 46 (L) >60 mL/min/1.73m*2    Calcium 7.8 (L) 8.6 - 10.6 mg/dL    Phosphorus 4.5 2.5 - 4.9 mg/dL    Albumin 3.9 3.4 - 5.0 g/dL   ECMO Arterial Blood Gas   Result Value Ref Range    POCT pH, Arterial ECMO 7.19 Reference range not established pH    POCT pCO2, Arterial ECMO 63 Reference range not established mm Hg    POCT pO2,  Arterial ECMO 367 Reference range not established mm Hg    POCT SO2, Arterial ECMO 99 Reference range not established %    POCT Oxy Hemoglobin, Arterial ECMO 96.6 Reference range not established %    POCT Base Excess, Arterial ECMO -4.1 Reference range not established mmol/L    POCT HCO3 Calculated, Arterial ECMO 24.1 Reference range not established mmol/L    Patient Temperature 37.0 degrees Celsius     FiO2 90 %   ECMO Venous Blood Gas   Result Value Ref Range    POCT pH, Venous ECMO 7.17 Reference range not established pH    POCT pCO2, Venous ECMO 55 Reference range not established mm Hg    POCT pO2,  Venous  ECMO 48 Reference range not established mm Hg    POCT SO2, Venous ECMO 82 Reference range not established %    POCT Oxy Hemoglobin, Venous ECMO 80.1 Reference range not established %    POCT Base Excess, Venous ECMO -7.9 Reference range not established mmol/L    POCT HCO3 Calculated, Venous ECMO 20.1 Reference range not established mmol/L    Patient Temperature 37.0 degrees Celsius    FiO2 90 %   Tacrolimus level   Result Value Ref Range    Tacrolimus  3.2 <=15.0 ng/mL   Blood Gas Arterial Full Panel Unsolicited   Result Value Ref Range    POCT pH, Arterial 7.41 7.38 - 7.42 pH    POCT pCO2, Arterial 34 (L) 38 - 42 mm Hg    POCT pO2, Arterial 120 (H) 85 - 95 mm Hg    POCT SO2, Arterial 100 94 - 100 %    POCT Oxy Hemoglobin, Arterial 96.0 94.0 - 98.0 %    POCT Hematocrit Calculated, Arterial 22.0 (L) 36.0 - 46.0 %    POCT Sodium, Arterial 133 (L) 136 - 145 mmol/L    POCT Potassium, Arterial 4.0 3.5 - 5.3 mmol/L    POCT Chloride, Arterial 103 98 - 107 mmol/L    POCT Ionized Calcium, Arterial 1.16 1.10 - 1.33 mmol/L    POCT Glucose, Arterial 115 (H) 74 - 99 mg/dL    POCT Lactate, Arterial 0.7 0.4 - 2.0 mmol/L    POCT Base Excess, Arterial -2.7 (L) -2.0 - 3.0 mmol/L    POCT HCO3 Calculated, Arterial 21.6 (L) 22.0 - 26.0 mmol/L    POCT Hemoglobin, Arterial 7.2 (L) 12.0 - 16.0 g/dL    POCT Anion Gap, Arterial 12 10 - 25 mmo/L    Patient Temperature 37.0 degrees Celsius   POCT GLUCOSE   Result Value Ref Range    POCT Glucose 115 (H) 74 - 99 mg/dL     *Note: Due to a large number of results and/or encounters for the requested time period, some results have not been displayed. A complete set of results can be found in Results Review.        ASSESSMENT AND PLAN:    Ms. Yimi Bowles is a 34 y.o. female who underwent a  kidney transplant surgery  on 3/31/2022 (Heart).    Principal Problem:    Cardiogenic shock (CMS/HCC)  Active Problems:    Heart transplant recipient (CMS/HCC)    ESRD (end stage renal disease) on dialysis (CMS/HCC)    HIRAM (acute kidney injury) (CMS/HCC)    Acute passive congestion of liver    Hyponatremia    Iron deficiency anemia    Abdominal pain    Cardiac transplant rejection (CMS/HCC)    Acute combined systolic (congestive) and diastolic (congestive) heart failure (CMS/HCC)      CRRT Management Note:  #HIRAM-D, 2/2 ATN in setting of cardiogenic shock. Started on CRRT initially 2/19 and transitioned to iHD however unable to achieve optimal fluid management so transitioned back to CRRT   - Hemodynamically stable with pt goal fluid removal -500ml/day. Will continue CVVH 4K. Pt remains on ECMO.   -strict I/O, daily RFP    Heart and Kidney transplant Candidacy :  Status 1 or heart kidney transplant - listed in [facility] on 4/2/24.  Pt met OPTN eligibility criteria for kidney transplant on 3/29/24 for sustained HIRAM over 6 weeks. Pre tx work up - per heart tx team.   Note:  GFR 2/17/24=23    [Sustained acute kidney injury : At least one of the following, or a combination of both of the following, for the last 6 weeks:    That the candidate has been on dialysis at least once every 7 days.    That the candidate has a measured or calculated CrCl or GFR less than or equal to 25 mL/min at least once every 7 days]        For questions, please contact transplant nephrology page x [phone]    Josue Gil,   PGY 4 Nephrology Fellow

## 2024-04-05 NOTE — PROGRESS NOTES
"Charla Bowles is a 34 y.o. female on day 49 of admission presenting with Cardiogenic shock (CMS/HCC).    Subjective   Patient calm, laying in bed. On room air and hemodynamically stable on VA ECMO and Impella support.        Objective     Physical Exam  ECMO in place, no chugging on lines   Impella in place with insertion dry and clean  Abdomen soft and nondistended   Moves all extremities  Alert and calm   Tolerating room air     Last Recorded Vitals  Blood pressure 97/75, pulse (!) 126, temperature 36.9 °C (98.4 °F), temperature source Axillary, resp. rate 25, height 1.549 m (5' 0.98\"), weight 90.3 kg (199 lb 1.2 oz), SpO2 100 %.  Intake/Output last 3 Shifts:  I/O last 3 completed shifts:  In: 1614.1 (17.9 mL/kg) [P.O.:680; I.V.:734.1 (8.1 mL/kg); IV Piggyback:200]  Out: 2979 (33 mL/kg) [Other:2979]  Weight: 90.3 kg           Malnutrition Diagnosis Status: Declining  Malnutrition Diagnosis: Moderate malnutrition related to acute disease or injury  As Evidenced by: pt's reported intake likely meeting <75% of estimated needs for at least 7 days, previous 5% weight loss in 1 month, LOS 42.  I agree with the dietitian's malnutrition diagnosis.      Assessment/Plan   Principal Problem:    Cardiogenic shock (CMS/HCC)  Active Problems:    Heart transplant recipient (CMS/HCC)    ESRD (end stage renal disease) on dialysis (CMS/HCC)    Immunosuppression (CMS/HCC)    HIRAM (acute kidney injury) (CMS/HCC)    Acute passive congestion of liver    Hyponatremia    Iron deficiency anemia    Abdominal pain    Systolic congestive heart failure (CMS/HCC)    History of left ventricular assist device (CMS/HCC)    History of extracorporeal membrane oxygenation treatment    Moderate protein-calorie malnutrition (CMS/HCC)    Heart transplant as cause of abnormal reaction or later complication (CMS/HCC)    Cardiac transplant rejection (CMS/HCC)    Acute combined systolic (congestive) and diastolic (congestive) heart failure " (CMS/[facility])    34 year old female with history of cardiac transplant in March 2022 for heart failure related to peripartum cardiomyopathy with subsequent rejection and cardiogenic shock requiring mechanical circulatory support with Impella and now VA ECMO. Patient re-listed for heart transplant and kidney transplant in setting of cardiogenic shock and acute renal failure.     Neuro: Continue PT/OT, out of bed. Pain control. Encourage sleep and REST protocol. Seroquel, melatonin at night, dexmedetomidine if needed. Adjunct therapies if wanted to normalize environment.   CV: Heart transplant, complicated by rejection now with cardiogenic shock requiring ECMO and Impella. Continue full support on ECMO, Impella P2.   Vascular: Vascular Surgery following, appreciate recs.   Pulm: Tolerating room air. Encourage IS. Follow CXR.   : Acute kidney injury, currently on CRRT - continue. Appreciate Renal consult. Optimize electrolytes. Goal negative half liter.   GI: Diet, bowel regimen. Continue PPI.   Heme: History of DVT with acute thromboses. No obvious epistaxis today. Continue heparin infusion. Maintain active type and screen, judicious transfusion if needed.   ID: Piperacillin-tazobactam until cultures negative x48H. Monitor for signs of infeciton.   Endo: Continue levothyroxine. SSI for glucose control.   Immunosuppression: Continue steroids, prophylaxis per transplant.     Lines: Consider IR for new trialysis line, NPO at midnight.     Dispo: Continue CTICU care.     This critically ill patient continues to be at risk for clinically significant deterioration / failure due to the above mentioned dysfunctional, unstable organ systems.  I have personally identified and managed all complex critical care issues to prevent aforementioned clinical deterioration.  Critical care time is spent at bedside and/or the immediate area and has included, but is not limited to, the review of diagnostic tests, labs, radiographs, serial  assessments of hemodynamics, respiratory status, ventilatory management, review of consult team recommendations, and family updates.  Time spent in procedures and teaching are reported separately. Critical Care Time: 41 minutes.     I spent 41 minutes in the professional and overall care of this patient.      Haylie Holguin, DO

## 2024-04-05 NOTE — PROGRESS NOTES
CTICU Progress Note  Charla Bowles/[id number]    Admit Date: 2/15/2024  Hospital Length of Stay: 50   ICU Length of Stay: 8d 12h   CT SURGEON: Dr. Witt    SUBJECTIVE:   Hemodynamics acceptable on provided therapy. Remains on Impella @ p2, ECMO @ 3190rpm/3.7L/90%/SW0.5, and heparin infusion.    MEDICATIONS  Infusions:  dexmedeTOMIDine, Last Rate: 0.2 mcg/kg/hr (04/05/24 0700)  heparin, Last Rate: 500 Units/hr (04/05/24 0700)  lactated Ringer's, Last Rate: 5 mL/hr (04/05/24 0700)  PrismaSol 4/2.5  sodium bicarbonate infusion Impella Purge 25 mEq/1000 mL D5W, Last Rate: 10 mL/hr (04/05/24 0700)      Scheduled:  acetaminophen, 650 mg, q6h  calcium carbonate-vitamin D3, 1 tablet, Daily  cyanocobalamin, 500 mcg, Daily  darbepoetin floyd, 100 mcg, q14 days  ferrous sulfate (325 mg ferrous sulfate), 65 mg of iron, Daily with breakfast  folic acid, 1 mg, Daily  insulin lispro, 0-5 Units, TID with meals  levothyroxine, 200 mcg, Daily  lidocaine, 1 patch, Daily  melatonin, 10 mg, Daily  multivitamin with minerals, 1 tablet, Daily  mycophenolate, 360 mg, BID  nystatin, 5 mL, q6h  oxygen, , Continuous - Inhalation  oxymetazoline, 2 spray, q12h  pantoprazole, 40 mg, Daily  piperacillin-tazobactam, 3.375 g, q8h  predniSONE, 10 mg, Daily  psyllium, 1 packet, Daily  QUEtiapine, 50 mg, Nightly  rosuvastatin, 40 mg, Nightly  sennosides-docusate sodium, 1 tablet, BID  sod phos di, mono-K phos mono, 500 mg, 4x daily  sodium chloride, 1 spray, 4x daily  [Held by provider] sulfamethoxazole-trimethoprim, 80 mg of trimethoprim, Daily  tacrolimus, 3.5 mg, q12h TRICIA  valGANciclovir, 450 mg, q48h      PRN:  bisacodyl, 10 mg, Daily PRN  calcium gluconate, 1 g, q6h PRN  calcium gluconate, 2 g, q6h PRN  dextrose, 25 g, q15 min PRN  dextrose, 25 g, q15 min PRN   Or  glucagon, 1 mg, q15 min PRN  hydrALAZINE, 5 mg, q4h PRN  hydrOXYzine HCL, 25 mg, q6h PRN  ipratropium-albuteroL, 3 mL, q6h PRN  artificial tears, 2 drop, PRN  magnesium  "sulfate, 2 g, q6h PRN  magnesium sulfate, 4 g, q6h PRN  microfibrllar collagen, , PRN  mupirocin, , BID PRN  naloxone, 0.2 mg, q5 min PRN  ondansetron, 4 mg, q4h PRN  oxyCODONE, 5 mg, q4h PRN  oxygen, , Continuous PRN - O2/gases  sodium chloride, 1 spray, 4x daily PRN      PHYSICAL EXAM:   Visit Vitals  BP 97/75   Pulse (!) 134   Temp 36.5 °C (97.7 °F) (Temporal)   Resp 20   Ht 1.549 m (5' 0.98\")   Wt 90.3 kg (199 lb 1.2 oz)   SpO2 100%   BMI 37.63 kg/m²   OB Status Hysterectomy   Smoking Status Never   BSA 1.97 m²     Wt Readings from Last 5 Encounters:   04/01/24 90.3 kg (199 lb 1.2 oz)   12/07/23 92.1 kg (203 lb)   12/01/23 93 kg (205 lb)   11/29/23 92.9 kg (204 lb 12.8 oz)   11/09/23 91.3 kg (201 lb 3.2 oz)     INTAKE/OUTPUT:  I/O last 3 completed shifts:  In: 2310.5 (25.6 mL/kg) [P.O.:1370; I.V.:740.5 (8.2 mL/kg); IV Piggyback:200]  Out: 3769 (41.7 mL/kg) [Other:3769]  Weight: 90.3 kg        Vent settings:       Physical Exam:   - CONSTITUTION: Female patient lying in ICU bed, in no acute distress  - NEUROLOGIC: A+Ox3, CAM negative, following in all 4 extremities, alert and oriented x3  - CARDIOVASCULAR: ST, R fem VA ECMO, no bleeding at site. R axillary impella, no bleeding at site. +distal signals in bilateral lower extremities  - RESPIRATORY: Coarse, diminished bilateral lung sounds, symmetric chest expansion, tachypnea  - GI: Abdomen soft, non tender, non distended, +bowel sounds  - : Anuria  - EXTREMITIES: Warm and dry, 1+ generalized edema  - SKIN: Left femoral skin breakdown with dressing in place  - PSYCHIATRIC: Anxious this morning, but     Daily Risk Screen  Central line: Planned for removal today    Images: Reviewed    Invasive Hemodynamics:    Most Recent Range Past 24hrs   BP (Art) 112/85 Arterial Line BP 1  Min: 91/73  Max: 112/85   MAP(Art) 94 mmHg Arterial Line MAP 1 (mmHg)   Min: 78 mmHg  Max: 94 mmHg   Mixed Venous 67.4 % SVO2 (%)  Min: 63.9 %  Max: 95.4 %     Assessment/Plan   34F with a " PMHx sig for stage D HFrEF (PPCM) s/p ICD s/p CardioMEMs, hypothyroidism 2/2 thyroidectomy, obesity s/p gastric bypass (2017), and SLE who is s/p OHT (3/31/2022) with a post-OHT course complicated by RIJ/RUE DVTs, leukopenia, left groin seroma, and asymptomatic 2R ACR (11/2022) treated with steroids, s/p total hysterectomy (2023), Flu A (1/2024).    Originally presented to [facility] ED on 2/15/24 with complaints of N/V x 3 days and inability to keep medications down. Found to have acute systolic heart failure with primary graft failure and cardiogenic shock. Treated for suspected acute cellular vs. acute antibody mediated rejection; however, multiple cardiac biopsies negative for signs of rejection or other causes of graft failure. Course has been complicated by multiple MCS devices for refractory cardiogenic shock (right femoral IABP: 2/18/24 - 3/1/24, Left femoral VA ECMO: 2/19/24 - 2/29/24, Right axillary impella 5.5: 3/1/24 - remains in place, 2nd right femoral VA ECMO: 3/27/24 - remains in place), ARF requiring RRT, acute liver injury, intubation due to volume overload in setting of pulmonary edema, severe hemolysis 2/2 to impella malposition, mild CMV viremia, bilateral provoked IJ DVTs, multiple episodes of epistaxis & coagulopathies requiring ongoing blood product transfusions.       Transferred to CTICU on 3/27/24 following right femoral VA ECMO placement due to worsening cardiogenic shock and multi-organ failure despite Impella 5.5 support and multiple inotropes.    Plan:  NEURO: No history. Previously neuro intact with acute pain and ongoing insomnia, received Seroquel and precedex overnight, improved sleep, however unclear if Seroquel alone could've been beneficial. Cam negative, Alert and oriented. Sat on edge of bed and stood at bedside with PT yesterday. -->  - Serial neuro and pain assessments  - Continue tylenol scheduled but will stop again if LFT uptrend.  - Continue lidoderm patch for back pain  -  PRN oxy 5mg Q4    - PRN dilaudid 0.2 mg q4hr   - Continue melatonin 10 mg HS (5 mg @ 1800, 5 mg @ 2100)  - Continue Seroquel 50 mg HS   - Reordered robaxin today  - Plan to start precedex earlier tonight to promote sleep  - Prn hydroxyzine  - PT/OT Consult; Goal sit to stand today  - CAM ICU score qshift. Sleep/wake cycle hygiene    ENT: Multiple episodes of epistaxis with interventions by ENT. Most recently in OR 3/27 with L nare packed. Packing removed yesterday, 4/1. -->  - appreciate ENT recs  - PRN ocean spray and mupiricin for dry nasal passages  - PRN afrin     Cardiac: Patient has a history of heart transplant in March of 2022 with primary graft dysfunction treated for acute cellular and antibody rejection without improvement with negative biopsy with multiple MCS devices for refractory cardiogenic shock (right femoral IABP: 2/18/24 - 3/1/24, Left femoral VA ECMO: 2/19/24 - 2/29/24, Right axillary impella 5.5: 3/1/24 - remains in place, 2nd right femoral VA ECMO: 3/27/24 - remains in place). Elevated LDH improved today on P2. Multiple attempts at repositioning impella previously. ECHO 3/29 with impella potentially deep but not adjusted again. ECMO ~3.7L flow/90%/sweep 0.5, impella 5.5 P2 with flows ~1.3L with resolved lactate and improving end organ function, tolerating weaning with downtrending LDH. ST 120s, MAPs 80-90s. -->  - >Listed as status 1  - Maintain goal MAP 70-90  - Continue full BiV support with ECMO.   - Wean Impella to P2  - Reduce home rosuvastatin dose to 10mg daily for impaired renal excretion  - Continue PRN IV hydralazine for MAP >90  - Trend daily LDH   - Hold home amlodipine    Vascular: 3/27 arterial duplex with proximal SFA occlusin with collateral flow. C/f LLE ischemia (previous ECMO site) with loss of pulses- now monophasic with CK that had been normal and no longer trending. Feet warm with intact motor exam. -->  - appreciate vascular surgery following  - Vascular Surgery  following for L SFA- No intervention needed at this time    PULM: No history. Intubated multiple times throughout hospital course for procedures; intubated 3/27 for OR. Improving acute respiratory failure persisting due to acute pulmonary edema which is now improving after aggressive volume removal. Now on room air with gradual improvement of pulmonary edema on CXR. -->  - CXR daily  - Adjust sweep for normal pH  - Maintain SPO2 > 92%    GI: History of gastric bypass and MMF colitis. Shock liver originally resolved; most recently elevated r/t likely congestive hepatopathy that is improving on ECMO. Abdominal pain improved with reduced myfortic dosing. Previous c/f GI bleed, likely blood from epistaxis; no current evidence of GI bleeding. H pylori negative, fecal calprotectin (elevated at 380), fecal lactoferrin (Positive 03/23/24). -->  - Continue calcitriol 0.5mg daily, multivitamin, calcium carbonate 1,250mg BID  - Continue PPI  - Avoiding placing dobhoff with epistaxis.   - NPO for potential IR procedure. Start regular diet pending IR per nutrition recommendations  - Continue Ensure Clear TID  - Add magic cup  - Continue miralax BID & senna-colace BID    : No history, baseline Cr 1.2-1.3. HIRAM originally on CVVH then with renal recovery but then again worsened and now dialysis dependent and failed diuretic challenges. Hypervolemia improved with aggressive volume removal. Persisting hypophosphatemia despite repeated supplementation. Last 24 hours -820 mL on CVVH. -->  - RFP as clinically indicated, replete electrolytes per CTICU protocol.   - Continue CVVH with goal removal -500 ml   - Continue Sodium Phos 500 mg 4x daily  - Nephrology Following    ENDO: History of hypothyroidism and prediabetes. TSH elevated originally to malabsorption then with dosing adjusted by endo; TSH (3/17): 20.74, T4 1.11, T3: 1.4. Steroid induced hyperglycemia acceptable glycemic control on SSI. -->  - Maintain BG <180 with hypoglycemia  protocol  - Continue SSI #1 AC/HS   - Continue Synthroid 200mcg daily.   - Endocrine following    HEME: History of iron deficiency anemia and remote DVT; again with acute DVT LIJ and SVT left cephalic vein and right cephalic vein. Acute blood loss anemia with repeated transfusions with significant hemolysis but no evidence of active bleeding; last received pRBC 3/30. Stable thrombocytopenia persisting and again downtrending; last PF4 negative 3/30. No epistaxis today. Hgb 6.5 this morning. -->  - Continue ferrous sulfate daily  - Holding ASA for CAD with thrombocytopenia  - Continue bicarb purge  - Continue systemic heparin 500 units/hr. High risk with DVT and SFA occlusion but high risk for bleeding. Expand heparin infusion to goal assay 0.3 to 0.4 after discussion with IR and removal of RIJ HD line.  - SCDs and for DVT prophylaxis  - Aranesp every 2 weeks  - Last type and screen: 4/3 (ordered for tomorrow)  - Avoid unnecessarily transfusing, HGB > 7.0. 2u PRBCs today.  - Bilateral upper DVT scan    Rejection/prophylaxis: S/p heart transplant 3/31/2022. Induction basiliximab. Donor HCV -, toxo -/-, CMV -/+. Mild ACR with +CD4 and negative HLAs however clinical presentation concern for acute cellular vs AMR rejection/stuttering rejection completed courses of thymo/PLEX/IVIG without clinical improvement.  - Steroids: Continue PO prednisone 10 mg daily  - Tacrolimus: 3.5 mg BID w/ daily levels drawn @ 0600  - Tacrolimus goal troughs: 8-10  - Continue Myfortic acid: 360mg BID.  Not starting MMF d/t hx of colitis  - Antifungals: nystatin 500,000units Q6  - Antivirals: valcyte 450mg Q48hrs  - Anti PCP & Toxoplasmosis: holding Bactrim SS daily due to thrombocytopenia    ID: Afebrile. C/f previous yeast infection. BC 3/27 NGTD final. MRSA screen negative 3/28. Episode of hypotension on 4/1, pan cultured and empiric Vanco/Zosyn started.  Patient was shivering 4/3, concern for risk of infection. Blood cultures sent and zosyn  restarted. -->  - Trend temp q4h  - Continue Zosyn until blood cultures NGTD x2  - Follow up cxs    Skin: Left groin wound from previous ECMO with wound consulted. Wound cx with NG 3/15.   - Preventative Mepilex dressings in place on sacrum and heels  - Change preventative Mepilex weekly or more frequently as indicated (when moist/soiled)   - Every shift skin assessment per nursing and weekly ICU skin rounds  - Moisture barrier to be applied with christopher care  - Active skin problems addressed with nursing on daily rounds  - wound recs from note 3/15 ordered     Proph:  SCDs  low systemic heparin  PPI    G: Lines  RIJ Trialysis - 3/5. -> Plan to remove today  R radial maxwell 3/27  PIV x2    Restraints: Not indicated.    Code status: Full Code    I personally spent 65 minutes of critical care time directly and personally managing the patient exclusive of separately billable procedures.    A,B,C,D,E,F,G: reviewed    Discussed with Dr. Bennett.  Dispo: CTICU care for now.    CTICU TEAM PHONE [id number]

## 2024-04-05 NOTE — PROGRESS NOTES
Physical Therapy    Physical Therapy Treatment    Patient Name: Charla Bowles  MRN: [id number]  Today's Date: 4/5/2024  Time Calculation  Start Time: 1233  Stop Time: 1320  Time Calculation (min): 47 min       Assessment/Plan   PT Assessment  PT Assessment Results: Decreased strength, Decreased endurance, Impaired balance, Decreased mobility  Rehab Prognosis: Good  Evaluation/Treatment Tolerance: Patient tolerated treatment well  End of Session Communication: Bedside nurse, PCT/NA/CTA  End of Session Patient Position: Bed, 3 rail up, Alarm off, not on at start of session  PT Plan  Inpatient/Swing Bed or Outpatient: Inpatient  PT Plan  Treatment/Interventions: Bed mobility, Transfer training, Gait training, Balance training, Neuromuscular re-education, Strengthening, Endurance training, Therapeutic exercise, Therapeutic activity  PT Plan: Skilled PT  PT Frequency: 5 times per week  PT Discharge Recommendations: High intensity level of continued care  PT Recommended Transfer Status: Assist x2  PT - OK to Discharge: Yes      General Visit Information:   PT  Visit  PT Received On: 04/05/24  Response to Previous Treatment: Patient with no complaints from previous session.  General  Family/Caregiver Present: Yes  Caregiver Feedback: Pts aunt present at start of session  Co-Treatment: OT  Co-Treatment Reason: Pt on ECMO requiring skilled 2 person assist for safe mobility  Prior to Session Communication: Bedside nurse, PCT/NA/CTA  Patient Position Received: Bed, 3 rail up, Alarm off, not on at start of session  Preferred Learning Style: auditory, verbal  General Comment: Pt awake, alert and willing to participate in PT session.  Pt completed bed mobility, EOB sitting, x3 sit<>stand transfers from EOB.  PT, OT, rehab tech, RN, and CNP present during session. (Impella: flow-1.1, P-2;  ECMO: flow-3.50, 90% FiO2, 1000 sweep;  CVVH, IV, teley, maxwell)    Subjective   Precautions:  Precautions  Medical Precautions: Cardiac  precautions, Fall precautions  Precautions Comment: R axillary impella precautions- no pushing/pulling with R UE, No lifting R UE above shoulder height, no R UE humeral EXT past plane of body, R femoral ECMO precautions, MAP 70-90, protective precautions  Vital Signs:  Vital Signs  Heart Rate: (!) 119 (Post: 110)  Heart Rate Source: Monitor  Resp: 23 (Max RR: 55;  Post: 23)  SpO2: 100 % (Post: 100)  BP: 92/80 (Post: 89/75)  MAP (mmHg): 85 (Post: 80)  BP Location: Left arm  BP Method: Arterial line  Patient Position: Lying    Objective   Pain:  Pain Assessment  Pain Assessment:  (Pt reported back pain during session, unrated.  RN provided pt with pain meds 30 min prior to start of session.)  Cognition:  Cognition  Overall Cognitive Status: Within Functional Limits  Arousal/Alertness: Delayed responses to stimuli  Orientation Level: Oriented X4  Following Commands: Follows one step commands with increased time (and repititions)  Activity Tolerance:  Activity Tolerance  Endurance: Tolerates 30 min exercise with multiple rests  Early Mobility/Exercise Safety Screen: Proceed with mobilization - No exclusion criteria met  Activity Tolerance Comments: Elevated RR during session with cues to low/control breathing  Treatments:  Therapeutic Exercise  Therapeutic Exercise Performed: Yes  Therapeutic Exercise Activity 1: 1x5 seated LAQs    Therapeutic Activity  Therapeutic Activity Performed: Yes  Therapeutic Activity 1: Increased time for skilled ICU line/tube management for safe functional mobility  Therapeutic Activity 2: Elevated HOB to 60 degrees prior to OOB transfer to assess ECMO stability.  Therapeutic Activity 3: Pt sat EOB ~20 min with varying assistance.  Pt demonstrates ability to sit upright without assistance jen requiring CGA however with increased time upright, pt leaning posterior on rehab tech with maxAx1 for rest and recovery. Vital signs and ECMO numbers stable. (RN managing ECMO lines.)    Bed  Mobility  Bed Mobility: Yes  Bed Mobility 1  Bed Mobility 1: Supine to sitting  Level of Assistance 1: Dependent (x2)  Bed Mobility Comments 1: HOB elevated  Bed Mobility 2  Bed Mobility  2: Sitting to supine  Level of Assistance 2: Dependent (x2)  Bed Mobility Comments 2: HOB elevated  Bed Mobility 3  Bed Mobility 3:  (Boost towards HOB)  Level of Assistance 3: Dependent (x2)  Bed Mobility Comments 3: HOB flat, draw sheet utilized  Bed Mobility 4  Bed Mobility 4: Rolling right, Rolling left  Level of Assistance 4: Maximum assistance (x1)  Bed Mobility Comments 4: HOB flat    Ambulation/Gait Training  Ambulation/Gait Training Performed: No  Transfers  Transfer: Yes  Transfer 1  Transfer From 1: Sit to, Stand to  Transfer to 1: Sit, Stand  Technique 1: Sit to stand, Stand to sit  Transfer Device 1:  (B arm in arm (ensuring pts on pulling on therapist with RUE d/t impella))  Transfer Level of Assistance 1: Maximum assistance (x2)  Trials/Comments 1: B knees blocked.  x3 transfers from EOB with rest breaks provided between bouts of standing. (1st trail pt able to achieve full stand, 2nd/3rd stands pt only able to achieve ~50-75% full stand.  Cues to lift trunk and bring glutes forward.  Pt in standing postion <15 sec.  Draw sheet utilized to last stand to assist with transfer.)    Outcome Measures:  [facility] Basic Mobility  Turning from your back to your side while in a flat bed without using bedrails: A lot  Moving from lying on your back to sitting on the side of a flat bed without using bedrails: A lot  Moving to and from bed to chair (including a wheelchair): A lot  Standing up from a chair using your arms (e.g. wheelchair or bedside chair): A lot  To walk in hospital room: Total  Climbing 3-5 steps with railing: Total  Basic Mobility - Total Score: 10    FSS-ICU  Ambulation: Unable to attempt due to weakness  Rolling: Maximal assistance (performs 25% - 49% of task)  Sitting: Minimal assistance (performs 75% or more  of task)  Transfer Sit-to-Stand: Maximal assistance (performs 25% - 49% of task)  Transfer Supine-to-Sit: Total assistance (performs 25% or requires another person)  Total Score: 9    Education Documentation  Precautions, taught by Michelle Lee PT at 4/5/2024  3:31 PM.  Learner: Patient  Readiness: Acceptance  Method: Explanation  Response: Verbalizes Understanding, Demonstrated Understanding    Body Mechanics, taught by Michelle Lee PT at 4/5/2024  3:31 PM.  Learner: Patient  Readiness: Acceptance  Method: Explanation  Response: Verbalizes Understanding, Demonstrated Understanding    Mobility Training, taught by Michelle Lee PT at 4/5/2024  3:31 PM.  Learner: Patient  Readiness: Acceptance  Method: Explanation  Response: Verbalizes Understanding, Demonstrated Understanding    Education Comments  No comments found.    EDUCATION:       Encounter Problems       Encounter Problems (Active)       Balance       Pt will demonstrate ability to complete sitting static/dynamic balance activities with unilateral UE support and no LOB for increase in safety up D/C.  (Progressing)       Start:  04/02/24    Expected End:  04/16/24               Mobility       Pt will demonstrated ability to complete 5 lateral side steps with modAx1, LRAD, proper form and no balance deficits for safe DC. (Progressing)       Start:  04/02/24    Expected End:  04/16/24            Pt will demonstrate ability to complete ther ex activities to improve functional strength for increase in independence upon DC.  (Progressing)       Start:  04/02/24    Expected End:  04/16/24               PT Transfers       Pt will demonstrated ability to complete bed mobility and sit<>stand transfers with mod assistance x2 and use of assistive device to safely DC. (Progressing)       Start:  04/02/24    Expected End:  04/16/24

## 2024-04-05 NOTE — PROGRESS NOTES
Charla Bowles is a 34 y.o. female on day 49 of admission presenting with Cardiogenic shock (CMS/HCC).    Patient requires ECMO support. Drainage cannula site, cannula, pump, oxygenator, return cannula and return cannula site clinically inspected. RPM and sweep reviewed and adjusted appropriate to clinical context. Anticoagulation status and intensity discussed. Plan for weaning, next clinical steps discussed with team and family. Discussed with cardiothoracic surgeon, perfusion, palliative care and physical/occupational therapy    ECMO Indication: heart failure, cardiac transplant rejection  ECMO Cannula Configuration: right femoral arterial-right femoral venous   ECMO circuit run from drainage cannula to pump to oxygenator to return cannula: completed   Presence of cannula bleeding: no evidence of bleeding  Presence of oxygenator clot: no evidence of clots  Pre/post PaO2 adequate: adequate  LV Unloading/Pulse Pressure: right axillary Impella in place   Distal Perfusion: distal reperfusion catheter in place  Anticoagulation Plan/Monitoring: heparin infusion   Mobility Plan/Candidacy: out of bed, PT/OT  Path to decannulation/anticipated decannulation date: awaiting re-transplant   ECMO:     Most Recent Range Past 24hrs   Flow 3.63 Patient Flow (L/min)  Min: 3.42  Max: 3.7   Speed 3190 Circuit Flow (RPM)  Min: 3189  Max: 3191   Sweep 0.5 Sweep Gas Flow Rate (L/min)  Min: 0.5  Max: 0.5       [Held by provider] dexmedeTOMIDine, 0.1-1.5 mcg/kg/hr, Last Rate: Stopped (04/03/24 0825)  heparin, 500 Units/hr, Last Rate: 500 Units/hr (04/04/24 2200)  lactated Ringer's, 5 mL/hr, Last Rate: 5 mL/hr (04/04/24 2200)  PrismaSol 4/2.5, 2,400 mL/hr  sodium bicarbonate infusion Impella Purge 25 mEq/1000 mL D5W, 10 mL/hr, Last Rate: 10 mL/hr (04/04/24 2200)      Please refer to progress note for full assessment and plan.   I, as the attending critical care physician, have personally reviewed the recent patient history,  physical exam, vital signs, significant labs, medications, imaging, and ECMO settings/flows of this critically ill patient. Using this information I will continue to actively manage this critically ill patient's extracorporeal membrane oxygenation (ECMO)/extracorporeal life support (ECLS) as documented in the assessment and plan above.      04/04/24 at 11:09 PM - Haylie Holguin DO         Home

## 2024-04-05 NOTE — NURSING NOTE
Multidisciplinary ECMO Rounds Note    Attendance:  CT Surgeon: Dr. Marina   CTICU Attending: Dr. Garcia   Palliative Care Attending: Dr. Chaudhary   Physical Therapy Present (Y/N): N  Perfusion Present (Y/N): N    ECMO Indication: cardiogenic shock   ECMO Cannula Configuration: Right Fem fem VA    Changes made to Hemodynamic/Ventilator/ECMO Management: none    Circuit Concerns: none  Bleeding Concerns: patient received 2 RBC today for hgb in the 6's.  Clotting/Ischemia Concerns: none at this time  Anticoagulation Plan/Monitoring: heparin    Mobility Plan/Candidacy: working with PT and getting to cardiac chair   Nutrition: regular diet  Patient/Family Concerns: none at this time  Nursing Concerns: none at this time    Patients Minimally Acceptable Outcome: make a full recovery  Barriers to decannulation/anticipated decannulation date:needs heart and kidney tx to be de annulated.     ECMO:     Most Recent Range Past 24hrs   Flow 3.53 Patient Flow (L/min)  Min: 3.51  Max: 3.7   Speed 3190 Circuit Flow (RPM)  Min: 3189  Max: 3191   Sweep 2 (changed sweep at 1411) Sweep Gas Flow Rate (L/min)  Min: 0.5  Max: 2       dexmedeTOMIDine, 0.1-1.5 mcg/kg/hr, Last Rate: Stopped (04/05/24 0854)  heparin, 500 Units/hr, Last Rate: 500 Units/hr (04/05/24 1400)  lactated Ringer's, 5 mL/hr, Last Rate: 5 mL/hr (04/05/24 1400)  PrismaSol 4/2.5, 2,400 mL/hr  sodium bicarbonate infusion Impella Purge 25 mEq/1000 mL D5W, 10 mL/hr, Last Rate: 10 mL/hr (04/05/24 1400)

## 2024-04-05 NOTE — PROGRESS NOTES
Occupational Therapy    Occupational Therapy Treatment    Name: Charla Bowles  MRN: [id number]  : 1990  Date: 24  Time Calculation  Start Time: 1235  Stop Time: 1321  Time Calculation (min): 46 min    Assessment:  End of Session Communication: Bedside nurse  End of Session Patient Position: Bed, 3 rail up, Alarm off, not on at start of session  Plan:  Treatment Interventions: ADL retraining, Functional transfer training, Endurance training, UE strengthening/ROM, Cognitive reorientation, Patient/family training, Equipment evaluation/education, Neuromuscular reeducation, Fine motor coordination activities, Compensatory technique education  OT Frequency: 5 times per week  OT Discharge Recommendations: High intensity level of continued care  Equipment Recommended upon Discharge:  (TBD)  OT Recommended Transfer Status: Assist of 2  OT - OK to Discharge: Yes    Subjective   General:  OT Last Visit  OT Received On: 24  Co-Treatment: PT  Co-Treatment Reason: Pt on ECMO requiring skilled 2 person assist for safe mobility  Prior to Session Communication: Bedside nurse, PCT/NA/CTA  Patient Position Received: Bed, 3 rail up, Alarm off, not on at start of session  Family/Caregiver Present: No  General Comment: Pt supine in bed upon arrival, drowsy but agreeable to participate. On VA ECMO flow 3.55 (post 3.50), 90% FiO2, sweep 1000. R axillary impella (P-2), flow 1.0. ECMO and impella examined pre, during and post mobility. Stable and secure. Pt also on heparin and CVVH throughout ECMO.   Precautions:  Hearing/Visual Limitations: WFL  Medical Precautions: Cardiac precautions, Fall precautions  Precautions Comment: R axillary impella precautions- no pushing/pulling with R UE, No lifting R UE above shoulder height, no R UE humeral EXT past plane of body, R femoral ECMO precautions, MAP 70-90, protective precautions  Vitals:  Vital Signs  Heart Rate: (!) 116 (Post: 120)  Resp: (!) 44 (Max mid 50s; Post:  18)  SpO2: 100 % (Post: 97)  Lines/Tubes/Drains:  ECMO Cannulation Peripheral Right;Femoral artery;Femoral vein (Active)   Number of days: 8       Arterial Line 03/27/24 Right Radial (Active)   Number of days: 8       Ventricular Assist Device Impella 5.5 (Active)   Number of days: 34       Hemodialysis Cath Triple lumen Right Jugular (Active)   Number of days: 26       Cognition:  Overall Cognitive Status: Within Functional Limits  Arousal/Alertness: Delayed responses to stimuli  Orientation Level: Oriented X4  Following Commands: Follows one step commands with increased time  Cognition Comments: Visibly fatigued and with increased WOB throughout session. Denied fatigue. Slow to respond to questions  Insight: Moderate  Impulsive: Mildly  Processing Speed: Delayed    Pain Assessment:  Pain Assessment  Pain Assessment: 0-10  Pain Score: 7  Pain Location: Back     Objective   Activities of Daily Living:        UE Dressing  UE Dressing Level of Assistance: Maximum assistance  UE Dressing Where Assessed: Bed level  UE Dressing Comments: don gown partially upright in bed        Bed Mobility/Transfers:     Bed Mobility  Bed Mobility: Yes  Bed Mobility 1  Bed Mobility 1: Supine to sitting, Sitting to supine  Level of Assistance 1: Dependent (x2)  Bed Mobility Comments 1: HOB elevated  Bed Mobility 2  Bed Mobility  2: Rolling right, Rolling left  Level of Assistance 2: Maximum assistance    Transfers  Transfer: Yes  Transfer 1  Transfer From 1: Sit to  Transfer to 1: Stand  Transfer Level of Assistance 1: Maximum assistance (x2 assist)  Trials/Comments 1: x3 trials with ~5 minute therapeutic rest breaks between trials. LUE arm in arm assist, trunk/hip support on right side. Utilized sheet around waist on trial 3. Achieved full stand on trial 1, achieved 50-75% stand on trials 2-3      Therapy/Activity:      Therapeutic Activity  Therapeutic Activity Performed: Yes  Therapeutic Activity 1: Progressive HOB elevation to 60  degrees prior to progressing to EOB to assess ECMO parameter stability  Therapeutic Activity 2: Pt sat EOB ~20 minutes. Assist needs fluctuated from CGA-max A. Pt very drowsy at EOB.     Outcome Measures:  [facility] Daily Activity  Putting on and taking off regular lower body clothing: Total  Bathing (including washing, rinsing, drying): A lot  Putting on and taking off regular upper body clothing: A lot  Toileting, which includes using toilet, bedpan or urinal: Total  Taking care of personal grooming such as brushing teeth: A little  Eating Meals: A little  Daily Activity - Total Score: 12     Education Documentation  Handouts, taught by Marcia Thomason OT at 4/5/2024  3:26 PM.  Learner: Patient  Readiness: Acceptance  Method: Explanation  Response: Verbalizes Understanding    Body Mechanics, taught by Marcia Thomason OT at 4/5/2024  3:26 PM.  Learner: Patient  Readiness: Acceptance  Method: Explanation  Response: Verbalizes Understanding    Precautions, taught by Marcia Thomason OT at 4/5/2024  3:26 PM.  Learner: Patient  Readiness: Acceptance  Method: Explanation  Response: Verbalizes Understanding    Home Exercise Program, taught by Marcia Thomason OT at 4/5/2024  3:26 PM.  Learner: Patient  Readiness: Acceptance  Method: Explanation  Response: Verbalizes Understanding    ADL Training, taught by Marcia Thomason OT at 4/5/2024  3:26 PM.  Learner: Patient  Readiness: Acceptance  Method: Explanation  Response: Verbalizes Understanding    Handouts, taught by Marcia Thomason OT at 4/4/2024  4:05 PM.  Learner: Patient  Readiness: Acceptance  Method: Explanation, Demonstration  Response: Verbalizes Understanding    Body Mechanics, taught by Marcia Thomason OT at 4/4/2024  4:05 PM.  Learner: Patient  Readiness: Acceptance  Method: Explanation, Demonstration  Response: Verbalizes Understanding    Precautions, taught by Marcia Thomason OT at  4/4/2024  4:05 PM.  Learner: Patient  Readiness: Acceptance  Method: Explanation, Demonstration  Response: Verbalizes Understanding    Home Exercise Program, taught by Marcia Thomason OT at 4/4/2024  4:05 PM.  Learner: Patient  Readiness: Acceptance  Method: Explanation, Demonstration  Response: Verbalizes Understanding    ADL Training, taught by Marcia Thomason, OT at 4/4/2024  4:05 PM.  Learner: Patient  Readiness: Acceptance  Method: Explanation, Demonstration  Response: Verbalizes Understanding    Education Comments  No comments found.        Goals:  Encounter Problems       Encounter Problems (Active)       ADLs       Patient will complete daily grooming tasks in standing with LRAD with supervision level of assistance and PRN adaptive equipment. (Progressing)       Start:  03/01/24    Expected End:  04/16/24               ADLs       Patient with complete lower body dressing with stand by assist level of assistance donning and doffing all LE clothes  with PRN adaptive equipment (Not met)       Start:  03/04/24    Expected End:  03/18/24    Resolved:  04/02/24    Updated to: Patient with complete UB bathing with stand by assist level of assistance  with PRN adaptive equipment    Update reason: re eval          Patient will complete toileting including hygiene clothing management/hygiene with stand by assist level of assistance. (Not met)       Start:  03/04/24    Expected End:  03/18/24    Resolved:  04/02/24    Updated to: Patient will complete upper body dressing including with mod I level of assistance.    Update reason: re eval         Patient with complete UB bathing with stand by assist level of assistance  with PRN adaptive equipment (Progressing)       Start:  04/02/24    Expected End:  04/16/24                Patient will complete upper body dressing including with mod I level of assistance. (Progressing)       Start:  04/02/24    Expected End:  04/16/24                   BALANCE        Pt will increase dynamic standing tolerance to >10 min with supervision using LRAD during functional mobility/ADLs without LOB in order to improve activity tolerance and balance for self-care tasks.  (Progressing)       Start:  03/01/24    Expected End:  04/16/24               COGNITION/SAFETY       Patient will participate in cognitive activities to demonstrate WFL score on further cognitive assessments, including Medi-Cog, MoCA and remain A&O x3, CAM (-).  (Progressing)       Start:  03/01/24    Expected End:  04/16/24               EXERCISE/STRENGTHENING       Patient will be educated on BUE HEP for increased ADL/IADL performance. (Progressing)       Start:  03/01/24    Expected End:  04/16/24               MOBILITY       Patient will perform Functional mobility max Household distances/Community Distances with supervision level of assistance and least restrictive device in order to improve safety and functional mobility. (Progressing)       Start:  03/01/24    Expected End:  04/16/24 04/05/24 at 3:28 PM   Marcia Thomason, OT   [phone]

## 2024-04-05 NOTE — PROGRESS NOTES
"Charla Bowles is a 34 y.o. female on day 50 of admission presenting with Cardiogenic shock (CMS/HCC).    Subjective   Patient appears more anxious and uncomfortable today. Hemodynamically stable on VA ECMO with Impella support, tolerating room air.        Objective     Physical Exam  Laying in bed  Appears uncomfortable, in no acute distress   VA ECMO in place   Impella in place   Abdomen soft and nondistended   Extremities without peripheral edema       Last Recorded Vitals  Blood pressure 92/80, pulse (!) 126, temperature 36.3 °C (97.3 °F), temperature source Temporal, resp. rate (!) 27, height 1.549 m (5' 0.98\"), weight 90.3 kg (199 lb 1.2 oz), SpO2 99 %.  Intake/Output last 3 Shifts:  I/O last 3 completed shifts:  In: 2310.5 (25.6 mL/kg) [P.O.:1370; I.V.:740.5 (8.2 mL/kg); IV Piggyback:200]  Out: 3769 (41.7 mL/kg) [Other:3769]  Weight: 90.3 kg     Assessment/Plan   Principal Problem:    Cardiogenic shock (CMS/HCC)  Active Problems:    Heart transplant recipient (CMS/HCC)    ESRD (end stage renal disease) on dialysis (CMS/HCC)    Immunosuppression (CMS/HCC)    HIRAM (acute kidney injury) (CMS/HCC)    Acute passive congestion of liver    Hyponatremia    Iron deficiency anemia    Abdominal pain    Systolic congestive heart failure (CMS/HCC)    History of left ventricular assist device (CMS/HCC)    History of extracorporeal membrane oxygenation treatment    Moderate protein-calorie malnutrition (CMS/HCC)    Heart transplant as cause of abnormal reaction or later complication (CMS/HCC)    Cardiac transplant rejection (CMS/HCC)    Acute combined systolic (congestive) and diastolic (congestive) heart failure (CMS/HCC)    Patient seen, evaluated, and discussed with the NILA.  I have personally obtained key components of the history and physical and have performed my own medical decision making.      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 Patient re-listed for heart transplant and kidney transplant in setting of cardiogenic shock and acute renal failure.      Neuro: Continue PT/OT, out of bed. Pain control. Encourage sleep and REST protocol. Will continue seroquel, melatonin at night--Qtc today to evaluate in event increase dose needed. Start dexmedetomidine at midnight to encourage sleep. Adjunct therapies if wanted to normalize environment.   CV: Heart transplant, complicated by rejection now with cardiogenic shock requiring ECMO and Impella. Continue full support on ECMO, Impella P2.   Vascular: Vascular Surgery following, appreciate recs.   Pulm: Tolerating room air. Encourage IS. Follow CXR.   : Acute kidney injury, currently on CRRT - continue. Appreciate Renal consult. Optimize electrolytes. Goal negative half liter. Will remove trialysis catheter from RIJ today for line holiday in neck--CRRT via ECMO circuit and plan for replacing line in next two days.   GI: Diet, bowel regimen. Continue PPI.   Heme: History of DVT with acute thromboses. No obvious epistaxis today. Continue heparin infusion. Maintain active type and screen. Slight downtrend in hemoglobin today without obvious signs of bleeding--will transfuse 2 RBC per discussion with Surgery and Heart Failure.   ID: Piperacillin-tazobactam until cultures negative x48H. Monitor for signs of infeciton.   Endo: Continue levothyroxine. SSI for glucose control.   Immunosuppression: Continue steroids, prophylaxis per transplant.      Lines: Will replace trialysis line. Re-consult IR if unable to place due to clots for placement under fluoroscopy.      Dispo: Continue CTICU care.      This critically ill patient continues to be at risk for clinically significant deterioration / failure due to the above mentioned dysfunctional, unstable organ systems.  I have  personally identified and managed all complex critical care issues to prevent aforementioned clinical deterioration.  Critical care time is spent at bedside and/or the immediate area and has included, but is not limited to, the review of diagnostic tests, labs, radiographs, serial assessments of hemodynamics, respiratory status, ventilatory management, review of consult team recommendations, and family updates.  Time spent in procedures and teaching are reported separately. Critical Care Time: 35 minutes.      I spent 35 minutes in the professional and overall care of this patient.       Haylie Holguin, DO

## 2024-04-05 NOTE — PROGRESS NOTES
[facility]  ADVANCED HEART FAILURE AND TRANSPLANT CARDIOLOGY   Charla Bowles/[id number]    Admit Date: 2/15/2024  Hospital Length of Stay: 50   ICU Length of Stay: 8d 16h   Primary Service: CTICU      Charla Bowles is a 34 y.o. female on day 50 of admission presenting with Cardiogenic shock (CMS/HCC).    Subjective   Improved sleep overnight; however, required precede at 0400 due to increased anxiety. Overall states she feels well.     Overnight events:  Concern for increased delirium and anxiety. Worse anemia with Hg decreased 6.5; however, remains hemodynamically stable.     Objective     Physical Exam  Constitutional:       Appearance: She is ill-appearing.   HENT:      Head: Normocephalic.      Comments: Not actively bleeding      Mouth/Throat:      Mouth: Mucous membranes are moist.      Pharynx: Oropharynx is clear. No oropharyngeal exudate or posterior oropharyngeal erythema.   Eyes:      Extraocular Movements: Extraocular movements intact.      Conjunctiva/sclera: Conjunctivae normal.      Pupils: Pupils are equal, round, and reactive to light.   Neck:      Vascular: No JVD.   Cardiovascular:      Rate and Rhythm: Tachycardia present.      Comments: Right axillary impella in place ~45cm at hub (no hematoma), Right femoral VA ECMO in place with reperfusion catheter (site appears clean and dry). Dopplerable radial and ulnar pulses bilaterally. Dopplerable DP/PT pulses bilateral.   Pulmonary:      Effort: No accessory muscle usage, respiratory distress or retractions.      Breath sounds: Normal breath sounds. No wheezing, rhonchi or rales.      Comments: RA - 2L NC. Tachypnea w/ RR 30-50, although does not appear in respiratory distress.    Abdominal:      General: Abdomen is flat. Bowel sounds are normal. There is no distension.      Palpations: Abdomen is soft. There is no mass.      Hernia: No hernia is present.   Musculoskeletal:         General: Swelling (+2 BLE  "edema) present. No tenderness. Normal range of motion.      Cervical back: Full passive range of motion without pain.   Skin:     General: Skin is dry.      Capillary Refill: Capillary refill takes less than 2 seconds.      Coloration: Skin is not jaundiced.      Findings: Bruising and lesion (Left groin wound appears clean with granulation tissue. No signs of infection.) present. No erythema, laceration, rash or wound.   Neurological:      General: No focal deficit present.      Mental Status: She is alert and oriented to person, place, and time.   Psychiatric:         Behavior: Behavior normal.         Last Recorded Vitals  Blood pressure 89/73, pulse (!) 124, temperature 36.4 °C (97.5 °F), temperature source Temporal, resp. rate (!) 49, height 1.549 m (5' 0.98\"), weight 90.3 kg (199 lb 1.2 oz), SpO2 99 %.  Intake/Output last 3 Shifts:  I/O last 3 completed shifts:  In: 2310.5 (25.6 mL/kg) [P.O.:1370; I.V.:740.5 (8.2 mL/kg); IV Piggyback:200]  Out: 3769 (41.7 mL/kg) [Other:3769]  Weight: 90.3 kg     Relevant Results  Scheduled medications  acetaminophen, 650 mg, oral, q6h  calcium carbonate-vitamin D3, 1 tablet, oral, Daily  cyanocobalamin, 500 mcg, oral, Daily  darbepoetin floyd, 100 mcg, subcutaneous, q14 days  ferrous sulfate (325 mg ferrous sulfate), 65 mg of iron, oral, Daily with breakfast  folic acid, 1 mg, oral, Daily  insulin lispro, 0-5 Units, subcutaneous, TID with meals  levothyroxine, 200 mcg, oral, Daily  lidocaine, 1 patch, transdermal, Daily  melatonin, 10 mg, oral, Daily  methocarbamol, 500 mg, oral, q6h TRICIA  multivitamin with minerals, 1 tablet, oral, Daily  mycophenolate, 360 mg, oral, BID  nystatin, 5 mL, Swish & Swallow, q6h  oxygen, , inhalation, Continuous - Inhalation  pantoprazole, 40 mg, intravenous, Daily  piperacillin-tazobactam, 3.375 g, intravenous, q6h  predniSONE, 10 mg, oral, Daily  psyllium, 1 packet, oral, Daily  QUEtiapine, 50 mg, oral, Nightly  rosuvastatin, 10 mg, oral, " Nightly  sennosides-docusate sodium, 1 tablet, oral, BID  sod phos di, mono-K phos mono, 500 mg, oral, 4x daily  sodium chloride, 1 spray, Each Nostril, 4x daily  [Held by provider] sulfamethoxazole-trimethoprim, 80 mg of trimethoprim, oral, Daily  tacrolimus, 3.5 mg, oral, q12h TRICIA  valGANciclovir, 450 mg, oral, q48h      Continuous medications  dexmedeTOMIDine, 0.1-1.5 mcg/kg/hr, Last Rate: Stopped (04/05/24 0854)  heparin, 500 Units/hr, Last Rate: 500 Units/hr (04/05/24 1200)  lactated Ringer's, 5 mL/hr, Last Rate: 5 mL/hr (04/05/24 1200)  PrismaSol 4/2.5, 2,400 mL/hr  sodium bicarbonate infusion Impella Purge 25 mEq/1000 mL D5W, 10 mL/hr, Last Rate: 10 mL/hr (04/05/24 1214)      PRN medications  PRN medications: bisacodyl, calcium gluconate, calcium gluconate, dextrose, dextrose **OR** glucagon, hydrALAZINE, HYDROmorphone, hydrOXYzine HCL, ipratropium-albuteroL, artificial tears, magnesium sulfate, magnesium sulfate, microfibrllar collagen, mupirocin, naloxone, ondansetron, oxyCODONE, oxygen, sodium chloride     Results for orders placed or performed during the hospital encounter of 02/15/24 (from the past 24 hour(s))   Blood Gas Arterial Full Panel Unsolicited   Result Value Ref Range    POCT pH, Arterial 7.31 (L) 7.38 - 7.42 pH    POCT pCO2, Arterial 47 (H) 38 - 42 mm Hg    POCT pO2, Arterial 245 (H) 85 - 95 mm Hg    POCT SO2, Arterial 100 94 - 100 %    POCT Oxy Hemoglobin, Arterial 96.3 94.0 - 98.0 %    POCT Hematocrit Calculated, Arterial 19.0 (L) 36.0 - 46.0 %    POCT Sodium, Arterial 135 (L) 136 - 145 mmol/L    POCT Potassium, Arterial 3.5 3.5 - 5.3 mmol/L    POCT Chloride, Arterial 105 98 - 107 mmol/L    POCT Ionized Calcium, Arterial 1.09 (L) 1.10 - 1.33 mmol/L    POCT Glucose, Arterial 171 (H) 74 - 99 mg/dL    POCT Lactate, Arterial 0.9 0.4 - 2.0 mmol/L    POCT Base Excess, Arterial -2.4 (L) -2.0 - 3.0 mmol/L    POCT HCO3 Calculated, Arterial 23.7 22.0 - 26.0 mmol/L    POCT Hemoglobin, Arterial 6.4 (LL)  12.0 - 16.0 g/dL    POCT Anion Gap, Arterial 10 10 - 25 mmo/L    Patient Temperature 37.0 degrees Celsius   Reticulocytes   Result Value Ref Range    Retic % 5.4 (H) 0.5 - 2.0 %    Retic Absolute 0.128 (H) 0.018 - 0.083 x10*6/uL    Reticulocyte Hemoglobin 29 28 - 38 pg    Immature Retic fraction 33.9 (H) <=16.0 %   Haptoglobin   Result Value Ref Range    Haptoglobin <10 (L) 37 - 246 mg/dL   CBC and Auto Differential   Result Value Ref Range    WBC 7.3 4.4 - 11.3 x10*3/uL    nRBC 3.6 (H) 0.0 - 0.0 /100 WBCs    RBC 2.38 (L) 4.00 - 5.20 x10*6/uL    Hemoglobin 6.5 (LL) 12.0 - 16.0 g/dL    Hematocrit 20.8 (L) 36.0 - 46.0 %    MCV 87 80 - 100 fL    MCH 27.3 26.0 - 34.0 pg    MCHC 31.3 (L) 32.0 - 36.0 g/dL    RDW 22.6 (H) 11.5 - 14.5 %    Platelets 85 (L) 150 - 450 x10*3/uL    Immature Granulocytes %, Automated 7.3 (H) 0.0 - 0.9 %    Immature Granulocytes Absolute, Automated 0.53 0.00 - 0.70 x10*3/uL   Lactate Dehydrogenase   Result Value Ref Range     (H) 84 - 246 U/L   Magnesium   Result Value Ref Range    Magnesium 2.25 1.60 - 2.40 mg/dL   Hepatic function panel   Result Value Ref Range    Albumin 3.7 3.4 - 5.0 g/dL    Bilirubin, Total 1.5 (H) 0.0 - 1.2 mg/dL    Bilirubin, Direct 0.6 (H) 0.0 - 0.3 mg/dL    Alkaline Phosphatase 155 (H) 33 - 110 U/L    ALT 17 7 - 45 U/L    AST 55 (H) 9 - 39 U/L    Total Protein 5.7 (L) 6.4 - 8.2 g/dL   BUN   Result Value Ref Range    Urea Nitrogen 11 6 - 23 mg/dL   Calcium, ionized   Result Value Ref Range    POCT Calcium, Ionized 1.01 (L) 1.1 - 1.33 mmol/L   Phosphorus   Result Value Ref Range    Phosphorus 2.8 2.5 - 4.9 mg/dL   Basic Metabolic Panel   Result Value Ref Range    Glucose 128 (H) 74 - 99 mg/dL    Sodium 140 136 - 145 mmol/L    Potassium 3.9 3.5 - 5.3 mmol/L    Chloride 102 98 - 107 mmol/L    Bicarbonate 23 21 - 32 mmol/L    Anion Gap 19 10 - 20 mmol/L    Urea Nitrogen 11 6 - 23 mg/dL    Creatinine 0.77 0.50 - 1.05 mg/dL    eGFR >90 >60 mL/min/1.73m*2    Calcium 7.9  (L) 8.6 - 10.6 mg/dL   Manual Differential   Result Value Ref Range    Neutrophils %, Manual 84.0 40.0 - 80.0 %    Lymphocytes %, Manual 3.0 13.0 - 44.0 %    Monocytes %, Manual 10.0 2.0 - 10.0 %    Eosinophils %, Manual 0.0 0.0 - 6.0 %    Basophils %, Manual 1.0 0.0 - 2.0 %    Atypical Lymphocytes %, Manual 1.0 0.0 - 2.0 %    Myelocytes %, Manual 1.0 0.0 - 0.0 %    Seg Neutrophils Absolute, Manual 6.13 1.20 - 7.00 x10*3/uL    Lymphocytes Absolute, Manual 0.22 (L) 1.20 - 4.80 x10*3/uL    Monocytes Absolute, Manual 0.73 0.10 - 1.00 x10*3/uL    Eosinophils Absolute, Manual 0.00 0.00 - 0.70 x10*3/uL    Basophils Absolute, Manual 0.07 0.00 - 0.10 x10*3/uL    Atypical Lymphs Absolute, Manual 0.07 0.00 - 0.50 x10*3/uL    Myelocytes Absolute, Manual 0.07 0.00 - 0.00 x10*3/uL    Total Cells Counted 100     RBC Morphology See Below     RBC Fragments Few     [place] Cells Few     Basophilic Stippling Present     Pappenheimer Bodies Present    Heparin Assay, UFH   Result Value Ref Range    Heparin Unfractionated 0.2 See Comment Below for Therapeutic Ranges IU/mL   ECMO Arterial Blood Gas   Result Value Ref Range    POCT pH, Arterial ECMO 7.27 Reference range not established pH    POCT pCO2, Arterial ECMO 57 Reference range not established mm Hg    POCT pO2,  Arterial ECMO 343 Reference range not established mm Hg    POCT SO2, Arterial ECMO 100 Reference range not established %    POCT Oxy Hemoglobin, Arterial ECMO 98.0 Reference range not established %    POCT Base Excess, Arterial ECMO -1.7 Reference range not established mmol/L    POCT HCO3 Calculated, Arterial ECMO 26.2 Reference range not established mmol/L    Patient Temperature 37.0 degrees Celsius    FiO2 90 %   ECMO Venous Blood Gas   Result Value Ref Range    POCT pH, Venous ECMO 7.27 Reference range not established pH    POCT pCO2, Venous ECMO 58 Reference range not established mm Hg    POCT pO2,  Venous  ECMO 38 Reference range not established mm Hg    POCT SO2, Venous  ECMO 70 Reference range not established %    POCT Oxy Hemoglobin, Venous ECMO 67.3 Reference range not established %    POCT Base Excess, Venous ECMO -0.3 Reference range not established mmol/L    POCT HCO3 Calculated, Venous ECMO 26.6 Reference range not established mmol/L    Patient Temperature 37.0 degrees Celsius    FiO2 90 %   Tacrolimus level   Result Value Ref Range    Tacrolimus  4.0 <=15.0 ng/mL   POCT GLUCOSE   Result Value Ref Range    POCT Glucose 133 (H) 74 - 99 mg/dL   Prepare RBC: 2 Units, Leukocytes Reduced (CMV reduced risk), CMV Seronegative   Result Value Ref Range    PRODUCT CODE O8186R31     Unit Number [id number]     Unit ABO O     Unit RH POS     XM INTEP COMP     Dispense Status TR     Blood Expiration Date May 03, 2024 23:59 EDT     PRODUCT BLOOD TYPE 5100     UNIT VOLUME 350     PRODUCT CODE O8038K53     Unit Number [id number]     Unit ABO O     Unit RH POS     XM INTEP COMP     Dispense Status TR     Blood Expiration Date May 03, 2024 23:59 EDT     PRODUCT BLOOD TYPE 5100     UNIT VOLUME 350      *Note: Due to a large number of results and/or encounters for the requested time period, some results have not been displayed. A complete set of results can be found in Results Review.       Malnutrition Diagnosis Status: Declining  Malnutrition Diagnosis: Moderate malnutrition related to acute disease or injury  As Evidenced by: pt's reported intake likely meeting <75% of estimated needs for at least 7 days, previous 5% weight loss in 1 month, LOS 42.  I agree with the dietitian's malnutrition diagnosis.      Assessment/Plan   Ms. Yimi Bowles is a 34F with a PMHx sig for stage D HFrEF (PPCM) s/p ICD s/p CardioMEMs, hypothyroidism 2/2 thyroidectomy, obesity s/p gastric bypass (2017), and SLE who is s/p OHT (3/31/2022) with a post-OHT course complicated by RIJ/RUE DVTs, leukopenia, left groin seroma, and asymptomatic 2R ACR (11/2022) treated with steroids, s/p total hysterectomy (2023),  Flu A (1/2024).     Originally presented to [facility] ED on 2/15/24 with complaints of N/V x 3 days and inability to keep medications down. Found to have acute systolic heart failure with primary graft failure and cardiogenic shock. Treated for suspected acute cellular vs. acute antibody mediated rejection; however, multiple cardiac biopsies negative for pathology indicating rejection or other causes of graft failure. Course has been complicated by multiple MCS devices for refractory cardiogenic shock (right femoral IABP: 2/18/24 - 3/1/24, Left femoral VA ECMO: 2/19/24 - 2/29/24, Right axillary impella 5.5: 3/1/24 - remains in place, 2nd right femoral VA ECMO: 3/27/24 - remains in place), ARF requiring RRT, acute liver injury, intubation due to hypoxic respiratory failure, severe hemolysis 2/2 to impella malposition, mild CMV viremia, bilateral provoked IJ DVTs, multiple episodes of epistaxis & coagulopathies requiring ongoing blood product transfusions.       Transferred to CTICU on 3/27/24 following right femoral VA ECMO placement due to worsening cardiogenic shock and multi-organ failure despite Impella 5.5 support and multiple inotropes. Now awaiting suitable organ donation as UNOS status 1 for heart-kidney transplantation (listed 4/3/24).      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  #Acute renal failure requiring RRT   #Acute transaminitis  #Thrombocytopenia   #Anemia   #Provoked bilateral IJ DVTs    #Left SFA occlusion      Donor/Recipient Infectious history:  CMV: -/+ (low grade CMV viremia w/ levels < 35 on 3/1/24, repeat CMV levels remain negative since 3/27/24)  Toxo: -/-   Hep C: -/-     Rejection/Prophylaxis (transplants):  - Steroids: Continue 10mg PO prednisone daily  - Tacrolimus: 3.5mg PO @ 0630 and 3.5mg PO @ 1830 w/ daily levels drawn @ 0600  - Tacrolimus goal troughs: 8-10  - Myfortic acid: 360mg BID    -  Antifungals: nystatin 500,000units Q6  - Antivirals: valcyte 450mg Q48hrs   - Anti PCP & Toxoplasmosis: holding Bactrim SS daily due to thrombocytopenia     Last cardiac biopsy: 2/16/24 with ACR grade 1R and no AMR (extremely low C4d+ detected), 2/20/24 negative for ACR and AMR  Last HLA: 4/2/24 negative for class 1 or 2 antibodies   Last RHC: closing numbers on 3/16/24 /86(97) RA 20, PAP 40/33(37), C.O./C.I. 5.5/2.8, PVR 88, SVR 1115   Last LHC: 2/18/24 negative for CAV and CAD   Last TTE/BOB: 3/27/24 shows LVEF 10-15% w/ global hypokinesis, severe RV dysfunction, mild-mod MR, trace TR and impella angled posteriorly   Osteopenia/osteoporosis prophylaxis: Vitamin D3 currently held. Continue calcium supplements  Peptic/gastric ulcer prophylaxis: Pantoprazole 40mg daily  CAV Prophylaxis: Holding Aspirin 81mg due to continued thrombocytopenia. Continue pravastatin daily.      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 Broad differential including SLE flair, giant cell vasculitis, CAV/CAD, viral cardiomyopathy, sarcoidosis, amyloidosis and allograft rejection amongst many others. 2xallograft biopsies on 2/16 & 2/20 remain negative for any significant pathology. Notably, both biopsies taken after rejection therapies implemented which may have reduced areas of graft damage.    - DSAs remain negative; however, patient may have non-HLA antibodies present. 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   - Negative CAV & CAD via LHC on 2/18/24   - Completed methylpred steroid pulse w/ 1g Q24 x 3 days (2/16-2/18) and prednisone taper   - Thymoglobulin doses: 2/18 & 2/19   - IVIG + PLEX Session: 2/18, 2/20, 3/7, 3/11 and 3/13    - Right femoral VA ECMO flowing 3.5L w/ FIO2 90% and sweep 0.5  - Right axillary impella for LV venting remains in place and set P2 w/ flows of 1.2  - LFTs near normal s/p ECMO cannulation with improving hemolytic labwork s/p impella wean    - Improving sleep; however, intermittently  delirious and anxious   - Chest x-ray with mild pulmonary edema   - Out of bed and standing daily   - Mildly hypotensive this morning with continued shacking. Patient at high risk for infection due to immunosuppression, invasive devices and deconditioning. Due to her immunosuppression she may not mount a robust immune response (lack of leukocytosis or fever), along with ECMO circuit masking fever due to cooling.      Transplant Status:  - Status 1 for heart-kidney (listed in UNOS on 4/2/24)  - ABO status: O+  - CMV+/EBV+/Toxo-/Hep B-/Hep C-  - Prospective cross match with every donor offer  - Due to potential risk of hyperacute allograft rejection, induction plan will be 500mg solumedrol x 2 (followed by steroid taper) and thymoglobulin     Recommendations:  - Agree with negative fluid status w/ goal 500-1L negative in 24hrs   - BCx2 on 4/3/24 remain NGTD, continue zosyn for now.   - Agree with increasing systemic heparin for MCS/DVTs and SFA anticoagulation   - 2xPRBC due to anemia and borderline hypotension. Goal Hg > 7.0 and platelets > 25 unless active bleeding.   - Agree with IR placement for iHD line and removal of old RIJ iHD line     Continue excellent care per CTICU team.      Patient was examined and discussed with Advanced Heart Failure and Transplant Cardiology attending physician, Dr. Kendrick Jain, APRN-CNP

## 2024-04-05 NOTE — PROGRESS NOTES
Charla Bowles is a 34 y.o. female on day 50 of admission presenting with Cardiogenic shock (CMS/HCC).    Patient requires ECMO support. Drainage cannula site, cannula, pump, oxygenator, return cannula and return cannula site clinically inspected. RPM and sweep reviewed and adjusted appropriate to clinical context. Anticoagulation status and intensity discussed. Plan for weaning, next clinical steps discussed with team and family. Discussed with cardiothoracic surgeon, perfusion, palliative care and physical/occupational therapy    ECMO Indication: heart failure, cardiac transplant rejection  ECMO Cannula Configuration: right femoral arterial-right femoral venous   ECMO circuit run from drainage cannula to pump to oxygenator to return cannula: completed   Presence of cannula bleeding: no evidence of bleeding  Presence of oxygenator clot: no evidence of clots   Pre/post PaO2 adequate: adequate  LV Unloading/Pulse Pressure: right axillary Impella 5.5 in place   Distal Perfusion: distal perfusion catheter in place   Anticoagulation Plan/Monitoring: heparin infusion   Mobility Plan/Candidacy: out of bed, PT/OT, goal ambulation   Path to decannulation/anticipated decannulation date: awaiting re-transplant heart/kidney     ECMO:     Most Recent Range Past 24hrs   Flow 3.53 Patient Flow (L/min)  Min: 3.51  Max: 3.7   Speed 3190 Circuit Flow (RPM)  Min: 3189  Max: 3191   Sweep 2 (changed sweep at 1411) Sweep Gas Flow Rate (L/min)  Min: 0.5  Max: 2       dexmedeTOMIDine, 0.1-1.5 mcg/kg/hr, Last Rate: Stopped (04/05/24 0854)  heparin, 500 Units/hr, Last Rate: 500 Units/hr (04/05/24 1400)  lactated Ringer's, 5 mL/hr, Last Rate: 5 mL/hr (04/05/24 1400)  PrismaSol 4/2.5, 2,400 mL/hr  sodium bicarbonate infusion Impella Purge 25 mEq/1000 mL D5W, 10 mL/hr, Last Rate: 10 mL/hr (04/05/24 1400)      I, as the attending critical care physician, have personally reviewed the recent patient history, physical exam, vital signs,  significant labs, medications, imaging, and ECMO settings/flows of this critically ill patient. Using this information I will continue to actively manage this critically ill patient's extracorporeal membrane oxygenation (ECMO)/extracorporeal life support (ECLS) as documented in the assessment and plan above.       Haylie Holguin, DO

## 2024-04-06 NOTE — TELEPHONE ENCOUNTER
[ON CALL NOTE] A message was received from Dr. Marks, attending cardiologist, stating that the patient had new onset leukocytosis, coagulopathy (INR 2.4), worsening tachypnea over the last 24 hours, and that she is A&OX2, and that he suggested making her status 7. Edward Witt and Norbert were also agreeable with this plan. Patient was made status 7 for heart transplant at this time as she is temporarily too sick. Patient was updated at the bedside by Dr. Marks.     Linnette Valderrama, RN on call kidney coordinator, also notified and stated that she would make the patient status 7 for kidney transplantation. Updated donor call wait list was sent to the rest of the team for notification.

## 2024-04-06 NOTE — NURSING NOTE
PICC    Assessed patient for PICC placement --not able to visualize veins in the upper arms due to excessive swelling.  NP and attending aware.

## 2024-04-06 NOTE — PROGRESS NOTES
"Charla Bowles is a 34 y.o. female on day 51 of admission presenting with Cardiogenic shock (CMS/HCC).    Subjective   Patient more somnolent and confused today.      Objective     Physical Exam  Somnolent  Tachypneic  Appears uncomfortable, not in any acute distress   VA ECMO in place   Impella in place   Abdomen soft and nondistended   Extremities without peripheral edema   New left great toe discoloration     Last Recorded Vitals  Blood pressure 92/80, pulse (!) 118, temperature 36.4 °C (97.5 °F), resp. rate (!) 46, height 1.549 m (5' 0.98\"), weight 84 kg (185 lb 3 oz), SpO2 100 %.  Intake/Output last 3 Shifts:  I/O last 3 completed shifts:  In: 2314.1 (27.5 mL/kg) [P.O.:690; I.V.:774.1 (9.2 mL/kg); Blood:700; IV Piggyback:150]  Out: 3619 (43.1 mL/kg) [Other:3619]  Weight: 84 kg       Assessment/Plan   Principal Problem:    Cardiogenic shock (CMS/HCC)  Active Problems:    Heart transplant recipient (CMS/HCC)    ESRD (end stage renal disease) on dialysis (CMS/HCC)    Immunosuppression (CMS/HCC)    HIRAM (acute kidney injury) (CMS/HCC)    Acute passive congestion of liver    Hyponatremia    Iron deficiency anemia    Abdominal pain    Systolic congestive heart failure (CMS/HCC)    History of left ventricular assist device (CMS/HCC)    History of extracorporeal membrane oxygenation treatment    Moderate protein-calorie malnutrition (CMS/HCC)    Heart transplant as cause of abnormal reaction or later complication (CMS/HCC)    Cardiac transplant rejection (CMS/HCC)    Acute combined systolic (congestive) and diastolic (congestive) heart failure (CMS/HCC)    Patient seen, evaluated, and discussed with the NILA.  I have personally obtained key components of the history and physical and have performed my own medical decision making.      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 Patient re-listed for heart transplant and kidney transplant in setting of cardiogenic shock and acute renal failure; however, team made decision this morning to move her to status 7 given new leukocytosis, worsening tachypnea, new coagulopathy today.      Neuro: More somnolent today and confused. Continue PT/OT, out of bed. Pain control. Encourage sleep and REST protocol. Will continue seroquel, melatonin at night. Dexmedetomidine at midnight to encourage sleep. Adjunct therapies if wanted to normalize environment.   CV: Heart transplant, complicated by rejection now with cardiogenic shock requiring ECMO and Impella. Continue full support on ECMO, Impella P2.   Vascular: Vascular Surgery following, appreciate recs.   Pulm: Increased tachypnea requiring nasal cannula today. Monitor closely for increased needs. Encourage IS. Follow CXR.   : Acute kidney injury, currently on CRRT - continue. Appreciate Renal consult. Optimize electrolytes. Goal even to negative half liter if tolerated. New trialysis catheter placed given loss of PIV access. Can continue CRRT via ECMO circuit for now and transition to catheter if needed (only venous access).   GI: Diet, bowel regimen. Continue PPI. Will potentially need to discuss TPN given ongoing nutritional status with poor appetite and avoidance of Dobbhoff due to epistaxis.   Heme: History of DVT with acute thromboses, including clots visualized in bilateral Ijs; US scans pending. New coagulopathy today, unknown etiology. No current signs of bleeding, monitor. Holding heparin infusion while coagulopathic. Maintain active type and screen.   ID: New leukocytosis today, along with tachypnea and decline in mental status. Will continue piperacillin-tazobactam and add vancomycin; prophylactic Bactrim. Monitor for signs of infection--cultures currently negative.   Endo: Continue levothyroxine. SSI for glucose control.    Immunosuppression: Continue steroids, prophylaxis per transplant.      Lines: No PIV access currently. PICC team unable to visualize targets. New RIJ trialysis catheter placed.      Dispo: Continue CTICU care. Discussed with HF attending Dr. Marks on rounds. Patient and family updated at bedside.      This critically ill patient continues to be at risk for clinically significant deterioration / failure due to the above mentioned dysfunctional, unstable organ systems.  I have personally identified and managed all complex critical care issues to prevent aforementioned clinical deterioration.  Critical care time is spent at bedside and/or the immediate area and has included, but is not limited to, the review of diagnostic tests, labs, radiographs, serial assessments of hemodynamics, respiratory status, ventilatory management, review of consult team recommendations, and family updates.  Time spent in procedures and teaching are reported separately. Critical Care Time: 45 minutes.      I spent 45 minutes in the professional and overall care of this patient.    Haylie Holguin, DO

## 2024-04-06 NOTE — PROGRESS NOTES
[facility]  ADVANCED HEART FAILURE AND TRANSPLANT CARDIOLOGY   Charla Bowles/[id number]    Admit Date: 2/15/2024  Hospital Length of Stay: 51   ICU Length of Stay: 9d 20h   Primary Service: CTICU      Charla Bowles is a 34 y.o. female on day 51 of admission presenting with Cardiogenic shock (CMS/HCC).    Subjective   She experienced low ECMO flows overnight which improved with administration of albumin and LR. Remains intermittently tachypneic this morning with increasing leukocytosis, concern for developing pneumonia, although she has remained afebrile. Continues to complain of pain at right axillary impella insertion site. She worked with PT yesterday and was able to stand up multiple times.         Objective     Physical Exam  Constitutional:       Appearance: She is ill-appearing.   HENT:      Head: Normocephalic.      Comments: Not actively bleeding      Mouth/Throat:      Mouth: Mucous membranes are moist.      Pharynx: Oropharynx is clear. No oropharyngeal exudate or posterior oropharyngeal erythema.   Eyes:      Extraocular Movements: Extraocular movements intact.      Conjunctiva/sclera: Conjunctivae normal.      Pupils: Pupils are equal, round, and reactive to light.   Neck:      Vascular: No JVD.   Cardiovascular:      Rate and Rhythm: Tachycardia present.      Comments: Right axillary impella in place ~45cm at hub (no hematoma), Right femoral VA ECMO in place with reperfusion catheter (site appears clean and dry). Dopplerable radial and ulnar pulses bilaterally. Dopplerable DP/PT pulses bilateral.   Pulmonary:      Effort: No accessory muscle usage, respiratory distress or retractions.      Breath sounds: Normal breath sounds. No wheezing, rhonchi or rales.      Comments: RA - 2L NC. Tachypnea w/ RR 30-50, although does not appear in respiratory distress.    Abdominal:      General: Abdomen is flat. Bowel sounds are normal. There is no distension.       "Palpations: Abdomen is soft. There is no mass.      Hernia: No hernia is present.   Musculoskeletal:         General: Swelling (+2 BLE edema) present. No tenderness. Normal range of motion.      Cervical back: Full passive range of motion without pain.   Skin:     General: Skin is dry.      Capillary Refill: Capillary refill takes less than 2 seconds.      Coloration: Skin is not jaundiced.      Findings: Bruising and lesion (Left groin wound appears clean with granulation tissue. No signs of infection.) present. No erythema, laceration, rash or wound.   Neurological:      General: No focal deficit present.      Mental Status: She is alert and oriented to person, place, and time.   Psychiatric:         Behavior: Behavior normal.         Last Recorded Vitals  Blood pressure 92/80, pulse (!) 120, temperature 36.6 °C (97.9 °F), temperature source Temporal, resp. rate (!) 58, height 1.549 m (5' 0.98\"), weight 84 kg (185 lb 3 oz), SpO2 (!) 63 %.  Intake/Output last 3 Shifts:  I/O last 3 completed shifts:  In: 2314.1 (27.5 mL/kg) [P.O.:690; I.V.:774.1 (9.2 mL/kg); Blood:700; IV Piggyback:150]  Out: 3619 (43.1 mL/kg) [Other:3619]  Weight: 84 kg     Relevant Results  Scheduled medications  acetaminophen, 650 mg, oral, q6h  bisacodyl, 10 mg, rectal, Once  calcium carbonate-vitamin D3, 1 tablet, oral, Daily  cyanocobalamin, 500 mcg, oral, Daily  darbepoetin floyd, 100 mcg, subcutaneous, q14 days  ferrous sulfate (325 mg ferrous sulfate), 65 mg of iron, oral, Daily with breakfast  folic acid, 1 mg, oral, Daily  insulin lispro, 0-5 Units, subcutaneous, TID with meals  levothyroxine, 200 mcg, oral, Daily  lidocaine, 1 patch, transdermal, Daily  melatonin, 10 mg, oral, Daily  multivitamin with minerals, 1 tablet, oral, Daily  mycophenolate, 360 mg, oral, BID  nystatin, 5 mL, Swish & Swallow, q6h  oxygen, , inhalation, Continuous - Inhalation  pantoprazole, 40 mg, intravenous, Daily  piperacillin-tazobactam, 3.375 g, intravenous, " q6h  predniSONE, 10 mg, oral, Daily  psyllium, 1 packet, oral, Daily  QUEtiapine, 50 mg, oral, Nightly  rosuvastatin, 10 mg, oral, Nightly  sennosides-docusate sodium, 1 tablet, oral, BID  sod phos di, mono-K phos mono, 500 mg, oral, 4x daily  sodium chloride, 1 spray, Each Nostril, 4x daily  sulfamethoxazole-trimethoprim, 80 mg of trimethoprim, oral, Daily  tacrolimus, 3.5 mg, oral, q12h TRICIA  valGANciclovir, 450 mg, oral, q48h  vancomycin, 750 mg, intravenous, q12h      Continuous medications  [Held by provider] heparin, 500 Units/hr, Last Rate: 500 Units/hr (04/06/24 0800)  lactated Ringer's, 5 mL/hr, Last Rate: 5 mL/hr (04/06/24 0700)  PrismaSol 4/2.5, 2,400 mL/hr  sodium bicarbonate infusion Impella Purge 25 mEq/1000 mL D5W, 10 mL/hr, Last Rate: 10 mL/hr (04/06/24 0700)      PRN medications  PRN medications: alteplase, bisacodyl, calcium gluconate, calcium gluconate, dextrose, dextrose **OR** glucagon, hydrALAZINE, HYDROmorphone, hydrOXYzine HCL, ipratropium-albuteroL, artificial tears, magnesium sulfate, magnesium sulfate, microfibrllar collagen, mupirocin, naloxone, ondansetron, oxyCODONE, oxygen, sodium chloride, vancomycin     Results for orders placed or performed during the hospital encounter of 02/15/24 (from the past 24 hour(s))   Coagulation Screen   Result Value Ref Range    Protime 27.1 (H) 9.8 - 12.8 seconds    INR 2.4 (H) 0.9 - 1.1    aPTT 44 (H) 27 - 38 seconds   Tacrolimus level   Result Value Ref Range    Tacrolimus  6.1 <=15.0 ng/mL   Reticulocytes   Result Value Ref Range    Retic % 4.2 (H) 0.5 - 2.0 %    Retic Absolute 0.132 (H) 0.018 - 0.083 x10*6/uL    Reticulocyte Hemoglobin 28 28 - 38 pg    Immature Retic fraction 34.5 (H) <=16.0 %   CBC and Auto Differential   Result Value Ref Range    WBC 12.5 (H) 4.4 - 11.3 x10*3/uL    nRBC 4.8 (H) 0.0 - 0.0 /100 WBCs    RBC 3.18 (L) 4.00 - 5.20 x10*6/uL    Hemoglobin 9.3 (L) 12.0 - 16.0 g/dL    Hematocrit 27.8 (L) 36.0 - 46.0 %    MCV 87 80 - 100 fL     MCH 29.2 26.0 - 34.0 pg    MCHC 33.5 32.0 - 36.0 g/dL    RDW 19.6 (H) 11.5 - 14.5 %    Platelets 94 (L) 150 - 450 x10*3/uL    Immature Granulocytes %, Automated 1.9 (H) 0.0 - 0.9 %    Immature Granulocytes Absolute, Automated 0.24 0.00 - 0.70 x10*3/uL   Lactate Dehydrogenase   Result Value Ref Range    LDH 1,172 (H) 84 - 246 U/L   Magnesium   Result Value Ref Range    Magnesium 2.11 1.60 - 2.40 mg/dL   Hepatic function panel   Result Value Ref Range    Albumin 3.7 3.4 - 5.0 g/dL    Bilirubin, Total 1.6 (H) 0.0 - 1.2 mg/dL    Bilirubin, Direct 0.8 (H) 0.0 - 0.3 mg/dL    Alkaline Phosphatase 166 (H) 33 - 110 U/L    ALT 18 7 - 45 U/L    AST 47 (H) 9 - 39 U/L    Total Protein 5.6 (L) 6.4 - 8.2 g/dL   BUN   Result Value Ref Range    Urea Nitrogen 12 6 - 23 mg/dL   Creatinine, Serum   Result Value Ref Range    Creatinine 0.71 0.50 - 1.05 mg/dL    eGFR >90 >60 mL/min/1.73m*2   Electrolyte panel   Result Value Ref Range    Sodium 138 136 - 145 mmol/L    Potassium 3.6 3.5 - 5.3 mmol/L    Chloride 101 98 - 107 mmol/L    Bicarbonate 22 21 - 32 mmol/L    Anion Gap 19 10 - 20 mmol/L   Calcium, ionized   Result Value Ref Range    POCT Calcium, Ionized 1.00 (L) 1.1 - 1.33 mmol/L   Phosphorus   Result Value Ref Range    Phosphorus 3.7 2.5 - 4.9 mg/dL   Manual Differential   Result Value Ref Range    Neutrophils %, Manual 67.5 40.0 - 80.0 %    Bands %, Manual 22.3 0.0 - 5.0 %    Lymphocytes %, Manual 3.4 13.0 - 44.0 %    Monocytes %, Manual 3.4 2.0 - 10.0 %    Eosinophils %, Manual 0.0 0.0 - 6.0 %    Basophils %, Manual 0.0 0.0 - 2.0 %    Atypical Lymphocytes %, Manual 3.4 0.0 - 2.0 %    Seg Neutrophils Absolute, Manual 8.44 (H) 1.20 - 7.00 x10*3/uL    Bands Absolute, Manual 2.79 (H) 0.00 - 0.70 x10*3/uL    Lymphocytes Absolute, Manual 0.43 (L) 1.20 - 4.80 x10*3/uL    Monocytes Absolute, Manual 0.43 0.10 - 1.00 x10*3/uL    Eosinophils Absolute, Manual 0.00 0.00 - 0.70 x10*3/uL    Basophils Absolute, Manual 0.00 0.00 - 0.10 x10*3/uL     Atypical Lymphs Absolute, Manual 0.43 0.00 - 0.50 x10*3/uL    Total Cells Counted 117     Neutrophils Absolute, Manual 11.23 (H) 1.20 - 7.70 x10*3/uL    RBC Morphology See Below     Polychromasia Mild     RBC Fragments Few     Sumner Cells Few     Acanthocytes Few    Type and screen   Result Value Ref Range    ABO TYPE O     Rh TYPE POS     ANTIBODY SCREEN NEG    POCT GLUCOSE   Result Value Ref Range    POCT Glucose 110 (H) 74 - 99 mg/dL   Hepatic function panel   Result Value Ref Range    Albumin 3.6 3.4 - 5.0 g/dL    Bilirubin, Total 1.7 (H) 0.0 - 1.2 mg/dL    Bilirubin, Direct 0.8 (H) 0.0 - 0.3 mg/dL    Alkaline Phosphatase 166 (H) 33 - 110 U/L    ALT 17 7 - 45 U/L    AST 44 (H) 9 - 39 U/L    Total Protein 5.6 (L) 6.4 - 8.2 g/dL   Coagulation Screen   Result Value Ref Range    Protime 27.3 (H) 9.8 - 12.8 seconds    INR 2.4 (H) 0.9 - 1.1    aPTT 44 (H) 27 - 38 seconds   Coagulation Screen   Result Value Ref Range    Protime 28.1 (H) 9.8 - 12.8 seconds    INR 2.5 (H) 0.9 - 1.1    aPTT 44 (H) 27 - 38 seconds   Heparin Assay, UFH   Result Value Ref Range    Heparin Unfractionated <0.1 See Comment Below for Therapeutic Ranges IU/mL   CBC   Result Value Ref Range    WBC 16.2 (H) 4.4 - 11.3 x10*3/uL    nRBC 3.8 (H) 0.0 - 0.0 /100 WBCs    RBC 3.08 (L) 4.00 - 5.20 x10*6/uL    Hemoglobin 9.0 (L) 12.0 - 16.0 g/dL    Hematocrit 27.2 (L) 36.0 - 46.0 %    MCV 88 80 - 100 fL    MCH 29.2 26.0 - 34.0 pg    MCHC 33.1 32.0 - 36.0 g/dL    RDW 19.8 (H) 11.5 - 14.5 %    Platelets 149 (L) 150 - 450 x10*3/uL   Renal Function Panel   Result Value Ref Range    Glucose 138 (H) 74 - 99 mg/dL    Sodium 137 136 - 145 mmol/L    Potassium 3.8 3.5 - 5.3 mmol/L    Chloride 101 98 - 107 mmol/L    Bicarbonate 22 21 - 32 mmol/L    Anion Gap 18 10 - 20 mmol/L    Urea Nitrogen 12 6 - 23 mg/dL    Creatinine 0.77 0.50 - 1.05 mg/dL    eGFR >90 >60 mL/min/1.73m*2    Calcium 7.5 (L) 8.6 - 10.6 mg/dL    Phosphorus 3.4 2.5 - 4.9 mg/dL    Albumin 3.5 3.4 -  5.0 g/dL   Calcium, ionized   Result Value Ref Range    POCT Calcium, Ionized 1.02 (L) 1.1 - 1.33 mmol/L   Magnesium   Result Value Ref Range    Magnesium 2.17 1.60 - 2.40 mg/dL   POCT GLUCOSE   Result Value Ref Range    POCT Glucose 129 (H) 74 - 99 mg/dL   Transthoracic Echo (TTE) Limited   Result Value Ref Range    LVIDd 4.80 cm    RVSP 33.9 mmHg   Prepare Plasma: 2 Units   Result Value Ref Range    PRODUCT CODE Q5911W23     Unit Number [id number]     Unit ABO O     Unit RH POS     Dispense Status XM     Blood Expiration Date April 11, 2024 00:45 EDT     PRODUCT BLOOD TYPE 5100     UNIT VOLUME 339     PRODUCT CODE O5114V96     Unit Number [id number]     Unit ABO O     Unit RH POS     Dispense Status XM     Blood Expiration Date April 11, 2024 00:45 EDT     PRODUCT BLOOD TYPE 5100     UNIT VOLUME 342      *Note: Due to a large number of results and/or encounters for the requested time period, some results have not been displayed. A complete set of results can be found in Results Review.       Malnutrition Diagnosis Status: Declining  Malnutrition Diagnosis: Moderate malnutrition related to acute disease or injury  As Evidenced by: pt's reported intake likely meeting <75% of estimated needs for at least 7 days, previous 5% weight loss in 1 month, LOS 42.  I agree with the dietitian's malnutrition diagnosis.      Assessment/Plan   Ms. Yimi Bowles is a 34F with a PMHx sig for stage D HFrEF (PPCM) s/p ICD s/p CardioMEMs, hypothyroidism 2/2 thyroidectomy, obesity s/p gastric bypass (2017), and SLE who is s/p OHT (3/31/2022) with a post-OHT course complicated by RIJ/RUE DVTs, leukopenia, left groin seroma, and asymptomatic 2R ACR (11/2022) treated with steroids, s/p total hysterectomy (2023), Flu A (1/2024).     Originally presented to [facility] ED on 2/15/24 with complaints of N/V x 3 days and inability to keep medications down. Found to have acute systolic heart failure with primary graft failure and cardiogenic  shock. Treated for suspected acute cellular vs. acute antibody mediated rejection; however, multiple cardiac biopsies negative for pathology indicating rejection or other causes of graft failure. Course has been complicated by multiple MCS devices for refractory cardiogenic shock (right femoral IABP: 2/18/24 - 3/1/24, Left femoral VA ECMO: 2/19/24 - 2/29/24, Right axillary impella 5.5: 3/1/24 - remains in place, 2nd right femoral VA ECMO: 3/27/24 - remains in place), ARF requiring RRT, acute liver injury, intubation due to hypoxic respiratory failure, severe hemolysis 2/2 to impella malposition, mild CMV viremia, bilateral provoked IJ DVTs, multiple episodes of epistaxis & coagulopathies requiring ongoing blood product transfusions.       Transferred to CTICU on 3/27/24 following right femoral VA ECMO placement due to worsening cardiogenic shock and multi-organ failure despite Impella 5.5 support and multiple inotropes. Now awaiting suitable organ donation as UNOS status 1 for heart-kidney transplantation (listed 4/3/24).      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  #Acute renal failure requiring RRT   #Acute transaminitis  #Thrombocytopenia   #Anemia   #Provoked bilateral IJ DVTs    #Left SFA occlusion   #Suspected pneumonia     Donor/Recipient Infectious history:  CMV: -/+ (low grade CMV viremia w/ levels < 35 on 3/1/24, repeat CMV levels remain negative since 3/27/24)  Toxo: -/-   Hep C: -/-     Rejection/Prophylaxis (transplants):  - Steroids: Continue 10mg PO prednisone daily  - Tacrolimus: Increased to 4.0 mg PO @ 0630 and 3.5mg PO @ 1830 w/ daily levels drawn @ 0600  - Tacrolimus goal troughs: 8-10  - Myfortic acid: 360mg BID    - Antifungals: nystatin 500,000units Q6  - Antivirals: valcyte 450mg Q48hrs   - Anti PCP & Toxoplasmosis: holding Bactrim SS daily due to thrombocytopenia     Last cardiac biopsy: 2/16/24 with  ACR grade 1R and no AMR (extremely low C4d+ detected), 2/20/24 negative for ACR and AMR  Last HLA: 4/2/24 negative for class 1 or 2 antibodies   Last RHC: closing numbers on 3/16/24 /86(97) RA 20, PAP 40/33(37), C.O./C.I. 5.5/2.8, PVR 88, SVR 1115   Last LHC: 2/18/24 negative for CAV and CAD   Last TTE/BOB: 3/27/24 shows LVEF 10-15% w/ global hypokinesis, severe RV dysfunction, mild-mod MR, trace TR and impella angled posteriorly   Osteopenia/osteoporosis prophylaxis: Vitamin D3 currently held. Continue calcium supplements  Peptic/gastric ulcer prophylaxis: Pantoprazole 40mg daily  CAV Prophylaxis: Holding Aspirin 81mg due to continued thrombocytopenia. Continue pravastatin daily.      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 Broad differential including SLE flair, giant cell vasculitis, CAV/CAD, viral cardiomyopathy, sarcoidosis, amyloidosis and allograft rejection amongst many others. 2xallograft biopsies on 2/16 & 2/20 remain negative for any significant pathology. Notably, both biopsies taken after rejection therapies implemented which may have reduced areas of graft damage.    - DSAs remain negative; however, patient may have non-HLA antibodies present. 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   - Negative CAV & CAD via LHC on 2/18/24   - Completed methylpred steroid pulse w/ 1g Q24 x 3 days (2/16-2/18) and prednisone taper   - Thymoglobulin doses: 2/18 & 2/19   - IVIG + PLEX Session: 2/18, 2/20, 3/7, 3/11 and 3/13    - Right femoral VA ECMO flowing 3.5L w/ FIO2 90% and sweep 0.5  - Right axillary impella for LV venting remains in place and set P2 w/ flows of 1.2  - LFTs near normal s/p ECMO cannulation with improving hemolytic labwork s/p impella wean    - Improving sleep; however, intermittently delirious and anxious   - Chest x-ray with mild pulmonary edema   - Out of bed and standing daily   - Mildly hypotensive this morning with continued shacking. Patient at high risk for  infection due to immunosuppression, invasive devices and deconditioning. Due to her immunosuppression she may not mount a robust immune response (lack of leukocytosis or fever), along with ECMO circuit masking fever due to cooling.      Transplant Status:  - Was listed Status 1 for combined heart-kidney (listed in UNOS on 4/2/24) --> on 4/6 changed listing to UNOS Status 7 for heart given development of new infection/sepsis, transplant coordinator will update kidney transplant team as well  - ABO status: O+  - CMV+/EBV+/Toxo-/Hep B-/Hep C-  - Prospective cross match with every donor offer  - Due to potential risk of hyperacute allograft rejection, induction plan will be 500mg solumedrol x 2 (followed by steroid taper) and thymoglobulin     Recommendations:  - Agree with broadening antibiotics to include Zosyn, Vancomycin, and adding Bactrim to provide coverage for possible PCP as a source of possible PNA  - Given additional dose of tacrolimus 0.5mg today, increase AM dose to 4.0mg @0630, continue PM dose of 3.5mg @1830  - Recommend repeat echocardiogram to assess impella placement which may be contributing to MR/ongoing pulmonary edema  - Agree with holding heparin infusion in the setting of elevated INR of 2.5  - Status post 2 units PRBCs yesterday. Goal Hgb > 7.0 and platelets > 25 unless active bleeding.   - Agree with PICC line placement for additional central access    Continue excellent care per CTICU team.      Patient was examined and discussed with Advanced Heart Failure and Transplant Cardiology attending physician, Dr. Alton Marks.    Signed,  Gwendolyn Del Valle MD  Heart Failure Fellow PGY4

## 2024-04-06 NOTE — PROCEDURES
Central Line    Date/Time: 4/6/2024 2:46 PM    Performed by: Mike Mcneil MD  Authorized by: Mike Mcneil MD    Consent:     Consent obtained:  Verbal    Consent given by:  Parent    Risks, benefits, and alternatives were discussed: yes      Risks discussed:  Arterial puncture, bleeding, infection, incorrect placement, nerve damage and pneumothorax    Alternatives discussed:  No treatment and delayed treatment  Universal protocol:     Procedure explained and questions answered to patient or proxy's satisfaction: yes      Relevant documents present and verified: yes      Test results available: yes      Imaging studies available: yes      Required blood products, implants, devices, and special equipment available: yes      Site/side marked: yes      Immediately prior to procedure, a time out was called: yes      Patient identity confirmed:  Verbally with patient, hospital-assigned identification number and arm band  Pre-procedure details:     Indication(s): central venous access and insufficient peripheral access      Hand hygiene: Hand hygiene performed prior to insertion      Sterile barrier technique: All elements of maximal sterile technique followed      Skin preparation:  Chlorhexidine    Skin preparation agent: Skin preparation agent completely dried prior to procedure    Sedation:     Sedation type:  None  Anesthesia:     Anesthesia method:  Local infiltration    Local anesthetic:  Lidocaine 1% w/o epi  Procedure details:     Location:  R internal jugular    Patient position:  Trendelenburg    Procedural supplies:  Triple lumen    Catheter size:  8.5 Fr    Landmarks identified: yes      Ultrasound guidance: yes      Ultrasound guidance timing: prior to insertion and real time      Sterile ultrasound techniques: Sterile gel and sterile probe covers were used      Number of attempts:  1    Successful placement: yes    Post-procedure details:     Post-procedure:  Dressing applied and line sutured    Assessment:   Blood return through all ports    Procedure completion:  Tolerated well, no immediate complications  Comments:      Trialysis line placed. Clot noted in RIJ. Wire visualized in vessel lateral to thrombus. Confirmed venous access with manometry.            Right Hand/Right Foot

## 2024-04-06 NOTE — NURSING NOTE
APN and ICU attending updated regarding dusky left great toe, unable to detect left DP signal, positive PT signal, able to move toes and denies numbness.

## 2024-04-06 NOTE — PROGRESS NOTES
CTICU Progress Note  Charla Bowles/[id number]    Admit Date: 2/15/2024  Hospital Length of Stay: 51   ICU Length of Stay: 9d 12h   CT SURGEON: Dr. Witt    SUBJECTIVE:   Low ECMO flows overnight, improved with 250 ml LR, 250 ml albumin. Listed status 7 for heart/kidney this AM 2/2 AMS, concern for sepsis and coagulopathy.    MEDICATIONS  Infusions:  dexmedeTOMIDine, Last Rate: Stopped (04/06/24 0703)  heparin, Last Rate: 500 Units/hr (04/06/24 0700)  lactated Ringer's, Last Rate: 5 mL/hr (04/06/24 0700)  PrismaSol 4/2.5  sodium bicarbonate infusion Impella Purge 25 mEq/1000 mL D5W, Last Rate: 10 mL/hr (04/06/24 0700)      Scheduled:  acetaminophen, 650 mg, q6h  calcium carbonate-vitamin D3, 1 tablet, Daily  cyanocobalamin, 500 mcg, Daily  darbepoetin floyd, 100 mcg, q14 days  ferrous sulfate (325 mg ferrous sulfate), 65 mg of iron, Daily with breakfast  folic acid, 1 mg, Daily  insulin lispro, 0-5 Units, TID with meals  lactated Ringer's, 250 mL, Once  levothyroxine, 200 mcg, Daily  lidocaine, 1 patch, Daily  melatonin, 10 mg, Daily  multivitamin with minerals, 1 tablet, Daily  mycophenolate, 360 mg, BID  nystatin, 5 mL, q6h  oxygen, , Continuous - Inhalation  pantoprazole, 40 mg, Daily  piperacillin-tazobactam, 3.375 g, q6h  predniSONE, 10 mg, Daily  psyllium, 1 packet, Daily  QUEtiapine, 50 mg, Nightly  rosuvastatin, 10 mg, Nightly  sennosides-docusate sodium, 1 tablet, BID  sod phos di, mono-K phos mono, 500 mg, 4x daily  sodium chloride, 1 spray, 4x daily  [Held by provider] sulfamethoxazole-trimethoprim, 80 mg of trimethoprim, Daily  tacrolimus, 3.5 mg, q12h TRICIA  valGANciclovir, 450 mg, q48h      PRN:  bisacodyl, 10 mg, Daily PRN  calcium gluconate, 1 g, q6h PRN  calcium gluconate, 2 g, q6h PRN  dextrose, 25 g, q15 min PRN  dextrose, 25 g, q15 min PRN   Or  glucagon, 1 mg, q15 min PRN  hydrALAZINE, 5 mg, q4h PRN  HYDROmorphone, 0.2 mg, q3h PRN  hydrOXYzine HCL, 25 mg, q6h PRN  ipratropium-albuteroL, 3  "mL, q6h PRN  artificial tears, 2 drop, PRN  magnesium sulfate, 2 g, q6h PRN  magnesium sulfate, 4 g, q6h PRN  microfibrllar collagen, , PRN  mupirocin, , BID PRN  naloxone, 0.2 mg, q5 min PRN  ondansetron, 4 mg, q4h PRN  oxyCODONE, 5 mg, q4h PRN  oxygen, , Continuous PRN - O2/gases  sodium chloride, 1 spray, 4x daily PRN      PHYSICAL EXAM:   Visit Vitals  BP 92/80 (BP Location: Left arm, Patient Position: Lying) Comment: Post: 89/75   Pulse (!) 119   Temp 36.7 °C (98.1 °F) (Temporal)   Resp (!) 45   Ht 1.549 m (5' 0.98\")   Wt 84 kg (185 lb 3 oz)   SpO2 100%   BMI 35.01 kg/m²   OB Status Hysterectomy   Smoking Status Never   BSA 1.9 m²     Wt Readings from Last 5 Encounters:   04/06/24 84 kg (185 lb 3 oz)   12/07/23 92.1 kg (203 lb)   12/01/23 93 kg (205 lb)   11/29/23 92.9 kg (204 lb 12.8 oz)   11/09/23 91.3 kg (201 lb 3.2 oz)     INTAKE/OUTPUT:  I/O last 3 completed shifts:  In: 2314.1 (27.5 mL/kg) [P.O.:690; I.V.:774.1 (9.2 mL/kg); Blood:700; IV Piggyback:150]  Out: 3619 (43.1 mL/kg) [Other:3619]  Weight: 84 kg        Vent settings:       Physical Exam:   - CONSTITUTION: Female patient lying in ICU bed, in no acute distress  - NEUROLOGIC: A+Ox2 but re-orientable, CAM negative, following in all 4 extremities, alert and oriented x3  - CARDIOVASCULAR: ST, R fem VA ECMO, no bleeding at site. R axillary impella, no bleeding at site. +distal signals in bilateral lower extremities  - RESPIRATORY: Coarse, diminished bilateral lung sounds, symmetric chest expansion, tachypnea  - GI: Abdomen soft, non tender, non distended, +bowel sounds  - : Anuric, on CVVH via ECMO circuit  - EXTREMITIES: Warm and dry, no peripheral edema  - SKIN: Left femoral skin breakdown with dressing in place  - PSYCHIATRIC: Lethargic, calm     Daily Risk Screen  Central line: Planned for removal today    Images: Reviewed    Invasive Hemodynamics:    Most Recent Range Past 24hrs   BP (Art) 71/58 Arterial Line BP 1  Min: 71/58  Max: 101/85 "   MAP(Art) 64 mmHg Arterial Line MAP 1 (mmHg)   Min: 64 mmHg  Max: 91 mmHg   Mixed Venous 93 % SVO2 (%)  Min: 66.8 %  Max: 94 %     Assessment/Plan   34F with a PMHx sig for stage D HFrEF (PPCM) s/p ICD s/p CardioMEMs, hypothyroidism 2/2 thyroidectomy, obesity s/p gastric bypass (2017), and SLE who is s/p OHT (3/31/2022) with a post-OHT course complicated by RIJ/RUE DVTs, leukopenia, left groin seroma, and asymptomatic 2R ACR (11/2022) treated with steroids, s/p total hysterectomy (2023), Flu A (1/2024).    Originally presented to [facility] ED on 2/15/24 with complaints of N/V x 3 days and inability to keep medications down. Found to have acute systolic heart failure with primary graft failure and cardiogenic shock. Treated for suspected acute cellular vs. acute antibody mediated rejection; however, multiple cardiac biopsies negative for signs of rejection or other causes of graft failure. Course has been complicated by multiple MCS devices for refractory cardiogenic shock (right femoral IABP: 2/18/24 - 3/1/24, Left femoral VA ECMO: 2/19/24 - 2/29/24, Right axillary impella 5.5: 3/1/24 - remains in place, 2nd right femoral VA ECMO: 3/27/24 - remains in place), ARF requiring RRT, acute liver injury, intubation due to volume overload in setting of pulmonary edema, severe hemolysis 2/2 to impella malposition, mild CMV viremia, bilateral provoked IJ DVTs, multiple episodes of epistaxis & coagulopathies requiring ongoing blood product transfusions.       Transferred to CTICU on 3/27/24 following right femoral VA ECMO placement due to worsening cardiogenic shock and multi-organ failure despite Impella 5.5 support and multiple inotropes.    Plan:  NEURO: No history. Previously neuro intact with acute pain and ongoing insomnia, received Seroquel and precedex overnight, improved sleep, however unclear if Seroquel alone could've been beneficial. Cam negative, Alert and oriented. Sat on edge of bed and stood at bedside with PT  yesterday. -->  - Serial neuro and pain assessments  - Continue tylenol scheduled but will stop again if LFT uptrend.  - Continue lidoderm patch for back pain  - PRN oxy 5mg Q4    - PRN dilaudid 0.2 mg q4hr   - Continue melatonin 10 mg HS (5 mg @ 1800, 5 mg @ 2100)  - Continue Seroquel 50 mg HS   - Robaxin x 48 hrs  - May use precedex at bedtime for sleep  - Prn hydroxyzine  - PT/OT Consult; mobilize as able  - CAM ICU score qshift. Sleep/wake cycle hygiene    ENT: Multiple episodes of epistaxis with interventions by ENT. Most recently in OR 3/27 with L nare packed. Packing removed 4/1. -->  - appreciate ENT recs  - PRN ocean spray and mupiricin for dry nasal passages  - PRN afrin     Cardiac: Patient has a history of heart transplant in March of 2022 with primary graft dysfunction treated for acute cellular and antibody rejection without improvement with negative biopsy with multiple MCS devices for refractory cardiogenic shock (right femoral IABP: 2/18/24 - 3/1/24, Left femoral VA ECMO: 2/19/24 - 2/29/24, Right axillary impella 5.5: 3/1/24 - remains in place, 2nd right femoral VA ECMO: 3/27/24 - remains in place). Elevated LDH improved on P2. Multiple attempts at repositioning impella previously. ECHO 3/29 with impella potentially deep but not adjusted again. ECMO ~3.12L flow/100%/sweep 1.5, impella 5.5 P2 with flows ~1 L appropriate end organ function, tolerating weaning with downtrending LDH. ST 120s, MAPs 60-70s. -->  - >Was listed as status 1, status 7 now with c/f sepsis and coagulopathy  - Maintain goal MAP 70-90  - Continue full BiV support with ECMO.   - Continue Impella at P2, echo today for positioning  - Continue rosuvastatin dose 10mg daily for impaired renal excretion  - Continue PRN IV hydralazine for MAP >90  - Trend daily LDH   - Hold home amlodipine    Vascular: 3/27 arterial duplex with proximal SFA occlusin with collateral flow. C/f LLE ischemia (previous ECMO site) with loss of pulses- now  monophasic with CK that had been normal and no longer trending. Feet warm with intact motor exam. -->  - appreciate vascular surgery following  - Vascular Surgery following for L SFA- No intervention needed at this time    PULM: No history. Intubated multiple times throughout hospital course for procedures; intubated 3/27 for OR. Improving acute respiratory failure persisting due to acute pulmonary edema which is now improving after aggressive volume removal. Now on room air. -->  - CXR daily  - Adjust sweep for normal pH  - Maintain SPO2 > 92%    GI: History of gastric bypass and MMF colitis. Shock liver originally resolved; most recently elevated r/t likely congestive hepatopathy that is improving on ECMO. Abdominal pain improved with reduced myfortic dosing. Previous c/f GI bleed, likely blood from epistaxis; no current evidence of GI bleeding. H pylori negative, fecal calprotectin (elevated at 380), fecal lactoferrin (Positive 03/23/24). Last BM 4/3. ->  - Continue calcitriol 0.5mg daily, multivitamin, calcium carbonate 1,250mg BID  - Continue PPI  - Avoiding placing dobhoff with epistaxis.   - Regular diet  - Continue Ensure Clear TID  - Add magic cup  - Continue miralax BID & senna-colace BID, suppository today    : No history, baseline Cr 1.2-1.3. HIRAM originally on CVVH then with renal recovery but then again worsened and now dialysis dependent and failed diuretic challenges. Hypervolemia improved with aggressive volume removal. Persisting hypophosphatemia despite repeated supplementation. Last 24 hours -1.5 mL on CVVH. -->  - RFP as clinically indicated, replete electrolytes per CTICU protocol.   - Continue CVVH with goal removal -500 ml   - Continue Sodium Phos 500 mg 4x daily  - Nephrology Following    ENDO: History of hypothyroidism and prediabetes. TSH elevated originally to malabsorption then with dosing adjusted by endo; TSH (3/17): 20.74, T4 1.11, T3: 1.4, improved 4/1; TSH 10.13, T4 0.70. Steroid  induced hyperglycemia acceptable glycemic control on SSI. -->  - Maintain BG <180 with hypoglycemia protocol  - Continue SSI #1 AC/HS   - Continue Synthroid 200mcg daily.   - Endocrine following, recommending continuing current synthroid dose after levels from 4/1, resending in one week (4/8)    HEME: History of iron deficiency anemia and remote DVT; again with acute DVT LIJ and SVT left cephalic vein and right cephalic vein. Acute blood loss anemia with repeated transfusions with significant hemolysis but no evidence of active bleeding; last received pRBC 3/30. Stable thrombocytopenia persisting and again downtrending; last PF4 negative 3/30. No epistaxis today. Received 2 U PRBC yesterday. Heparin infusion at set rate 500 units/hr. Coagulopathic with INR 2.5 this AM from 1.2 yesterday. -->  - Continue ferrous sulfate daily  - Holding ASA for CAD with thrombocytopenia  - Continue bicarb purge  - Hold systemic heparin with elevated INR  - SCDs and for DVT prophylaxis  - Aranesp every 2 weeks  - Last type and screen: 4/5  - Avoid unnecessarily transfusing, HGB > 7.0  - Bilateral upper DVT scan  - PICC team consulted today for line placement, not able to visualize any vessels in arms to place line.     Rejection/prophylaxis: S/p heart transplant 3/31/2022. Induction basiliximab. Donor HCV -, toxo -/-, CMV -/+. Mild ACR with +CD4 and negative HLAs however clinical presentation concern for acute cellular vs AMR rejection/stuttering rejection completed courses of thymo/PLEX/IVIG without clinical improvement.  - Steroids: Continue PO prednisone 10 mg daily  - Tacrolimus: 3.5 mg BID w/ daily levels drawn @ 0600  - Tacrolimus goal troughs: 8-10  - Continue Myfortic acid: 360mg BID.  Not starting MMF d/t hx of colitis  - Antifungals: nystatin 500,000units Q6  - Antivirals: valcyte 450mg Q48hrs  - Anti PCP & Toxoplasmosis: restart SS Bactrim with c/f PNA    ID: Afebrile. C/f previous yeast infection. BC 3/27 NGTD final. MRSA  screen negative 3/28. Episode of hypotension on 4/1, pan cultured and empiric Vanco/Zosyn started.  Patient was shivering 4/3, concern for risk of infection. Blood cultures sent 4/3, NGTD. Zosyn (4/3 - )-->  - Trend temp q4h  - Continue Zosyn  - Start vanco  - Follow up cxs    Skin: Left groin wound from previous ECMO with wound consulted. Wound cx with NG 3/15.   - Preventative Mepilex dressings in place on sacrum and heels  - Change preventative Mepilex weekly or more frequently as indicated (when moist/soiled)   - Every shift skin assessment per nursing and weekly ICU skin rounds  - Moisture barrier to be applied with christopher care  - Active skin problems addressed with nursing on daily rounds  - wound recs from note 3/15 ordered     Proph:  SCDs  Holding systemic heparin  PPI    G: Lines  RIJ Trialysis - 4/6  R radial maxwell 3/27  PIV x2    Restraints: Not indicated.    Code status: Full Code    I personally spent 55 minutes of critical care time directly and personally managing the patient exclusive of separately billable procedures.    A,B,C,D,E,F,G: reviewed    Discussed with Dr. Holguin.  Dispo: CTICU care for now.    CTICU TEAM PHONE [phone]

## 2024-04-06 NOTE — PROGRESS NOTES
Charla Bowles is a 34 y.o. female on day 51 of admission presenting with Cardiogenic shock (CMS/HCC).    Patient requires ECMO support. Drainage cannula site, cannula, pump, oxygenator, return cannula and return cannula site clinically inspected. RPM and sweep reviewed and adjusted appropriate to clinical context. Anticoagulation status and intensity discussed. Plan for weaning, next clinical steps discussed with team and family. Discussed with cardiothoracic surgeon, perfusion, palliative care and physical/occupational therapy    ECMO Indication: heart failure, cardiac transplant rejection  ECMO Cannula Configuration: right femoral arterial-right femoral venous   ECMO circuit run from drainage cannula to pump to oxygenator to return cannula: completed  Presence of cannula bleeding: no evidence of bleeding  Presence of oxygenator clot: none appreciated  Pre/post PaO2 adequate: adequate  LV Unloading/Pulse Pressure: right axillary Impella 5.5   Distal Perfusion: distal perfusion catheter in place  Anticoagulation Plan/Monitoring: heparin infusion  Mobility Plan/Candidacy: out of bed, PT/OT  Path to decannulation/anticipated decannulation date: moved to status 7 today for re-transplant heart with kidney  ECMO:     Most Recent Range Past 24hrs   Flow 3.14 Patient Flow (L/min)  Min: 3  Max: 3.6   Speed 3191 Circuit Flow (RPM)  Min: 3189  Max: 3191   Sweep 1.5 Sweep Gas Flow Rate (L/min)  Min: 1.5  Max: 1.5       [Held by provider] heparin, 500 Units/hr, Last Rate: 500 Units/hr (04/06/24 0800)  lactated Ringer's, 5 mL/hr, Last Rate: 5 mL/hr (04/06/24 0700)  PrismaSol 4/2.5, 2,400 mL/hr  sodium bicarbonate infusion Impella Purge 25 mEq/1000 mL D5W, 10 mL/hr, Last Rate: 10 mL/hr (04/06/24 0700)      I, as the attending critical care physician, have personally reviewed the recent patient history, physical exam, vital signs, significant labs, medications, imaging, and ECMO settings/flows of this critically ill  patient. Using this information I will continue to actively manage this critically ill patient's extracorporeal membrane oxygenation (ECMO)/extracorporeal life support (ECLS) as documented in the assessment and plan above.     Haylie Holguin, DO

## 2024-04-06 NOTE — PROGRESS NOTES
Received a call from heart transplant coordinator.  Patient is too sick to be active on transplant list at this time.  Patient placed status 7 on heart transplant list.  Placed status 7 on kidney transplant list.  Unet updated.

## 2024-04-06 NOTE — PROGRESS NOTES
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 She has acute renal failure now with >6 weeks of RRT needs and is currently listed for  dual kidney/heart transplantation though in setting of new leukocytosis, coagulopathy, tachypnea has been deactivated.     - Continue excellent CTICU care  - would obtain echo to assess impella positioning with worsening pulm edema  - aggressive RRT  - continue broad spectrum antibiotics, low threshhold to broaden  - with improving platelet count would start bactrim again  - monitor tac levels, continue myfortic.     This critically ill patient continues to be at-risk for clinically significant deterioration / failure due to the above mentioned dysfunctional, unstable organ systems.  I have personally identified and managed all complex critical care issues to prevent aforementioned clinical deterioration.  Critical care time is spent at bedside and/or the immediate area and has included, but is not limited to, the review of diagnostic tests, labs, radiographs, serial assessments of hemodynamics, respiratory status, ventilatory management, and family updates.  Time spent in procedures and teaching are reported separately.    Critical care time: ____ minutes     Objective    Charla Bowles/[id number]  Admit Date: 2/15/2024  Hospital Length of Stay: 51   ICU Length of Stay: 9d 12h     MEDICATIONS  Infusions:  dexmedeTOMIDine, Last Rate: Stopped (04/06/24 0703)  heparin, Last Rate: 500 Units/hr (04/06/24 0700)  lactated Ringer's, Last Rate: 5 mL/hr (04/06/24 0700)  PrismaSol 4/2.5  sodium bicarbonate infusion Impella Purge 25 mEq/1000 mL D5W, Last Rate: 10 mL/hr (04/06/24  0700)      Scheduled:  acetaminophen, 650 mg, q6h  calcium carbonate-vitamin D3, 1 tablet, Daily  cyanocobalamin, 500 mcg, Daily  darbepoetin floyd, 100 mcg, q14 days  ferrous sulfate (325 mg ferrous sulfate), 65 mg of iron, Daily with breakfast  folic acid, 1 mg, Daily  insulin lispro, 0-5 Units, TID with meals  lactated Ringer's, 250 mL, Once  levothyroxine, 200 mcg, Daily  lidocaine, 1 patch, Daily  melatonin, 10 mg, Daily  multivitamin with minerals, 1 tablet, Daily  mycophenolate, 360 mg, BID  nystatin, 5 mL, q6h  oxygen, , Continuous - Inhalation  pantoprazole, 40 mg, Daily  piperacillin-tazobactam, 3.375 g, q6h  predniSONE, 10 mg, Daily  psyllium, 1 packet, Daily  QUEtiapine, 50 mg, Nightly  rosuvastatin, 10 mg, Nightly  sennosides-docusate sodium, 1 tablet, BID  sod phos di, mono-K phos mono, 500 mg, 4x daily  sodium chloride, 1 spray, 4x daily  [Held by provider] sulfamethoxazole-trimethoprim, 80 mg of trimethoprim, Daily  tacrolimus, 3.5 mg, q12h TRICIA  valGANciclovir, 450 mg, q48h      PRN:  bisacodyl, 10 mg, Daily PRN  calcium gluconate, 1 g, q6h PRN  calcium gluconate, 2 g, q6h PRN  dextrose, 25 g, q15 min PRN  dextrose, 25 g, q15 min PRN   Or  glucagon, 1 mg, q15 min PRN  hydrALAZINE, 5 mg, q4h PRN  HYDROmorphone, 0.2 mg, q3h PRN  hydrOXYzine HCL, 25 mg, q6h PRN  ipratropium-albuteroL, 3 mL, q6h PRN  artificial tears, 2 drop, PRN  magnesium sulfate, 2 g, q6h PRN  magnesium sulfate, 4 g, q6h PRN  microfibrllar collagen, , PRN  mupirocin, , BID PRN  naloxone, 0.2 mg, q5 min PRN  ondansetron, 4 mg, q4h PRN  oxyCODONE, 5 mg, q4h PRN  oxygen, , Continuous PRN - O2/gases  sodium chloride, 1 spray, 4x daily PRN        Prior to Admission Meds:  Medications Prior to Admission   Medication Sig Dispense Refill Last Dose    Alcohol Prep Pads pads, medicated        amLODIPine (Norvasc) 2.5 mg tablet TAKE ONE (1) TABLET BY MOUTH ONCE DAILY 30 tablet 11     aspirin 81 mg EC tablet TAKE ONE (1) TABLET BY MOUTH ONCE A DAY  30 tablet 10     blood sugar diagnostic (OneTouch Verio test strips) strip 4 times a day.       calcitriol (Rocaltrol) 0.5 mcg capsule TAKE ONE (1) CAPSULE BY MOUTH ONCE DAILY. 90 capsule 3     calcium carbonate (Oscal) 500 mg calcium (1,250 mg) tablet TAKE ONE (1) TABLET BY MOUTH TWICE DAILY. TAKE AT LEAST 2 HOURS BEFORE OR 2 HOURS AFTER IMMUNOSUPPRESSION MEDICATIONS. 60 tablet 11     docusate sodium (Colace) 100 mg capsule Take 1 capsule (100 mg) by mouth 2 times a day as needed.       ferrous sulfate, 325 mg ferrous sulfate, tablet TAKE ONE (1) TABLET BY MOUTH DAILY. 90 tablet 3     insulin lispro (HumaLOG) 100 unit/mL injection USE FOR SLIDING SCALE INSULIN COVERAGE UP TO 4 TIMES DAILY AS DIRECTED. MAX OF 40 UNITS PER DAY. 15 mL 11     levothyroxine (Synthroid, Levoxyl) 200 mcg tablet TAKE ONE (1) TABLET BY MOUTH ONCE DAILY. (Patient taking differently: Take 2 tablets (400 mcg) by mouth once daily.) 90 tablet 3     [] magnesium oxide (Mag-Ox) 400 mg (241.3 mg magnesium) tablet TAKE TWO (2) TABLETS BY MOUTH DAILY 60 tablet 11     multivitamin tablet Take 1 tablet by mouth once daily.       [] nystatin (Mycostatin) cream Apply topically 2 times a day. 15 g 1     pantoprazole (ProtoNix) 40 mg EC tablet TAKE ONE (1) TABLET BY MOUTH DAILY. 30 tablet 11     predniSONE (Deltasone) 5 mg tablet TAKE ONE (1) TABLET BY MOUTH ONCE DAILY. 90 tablet 3     rosuvastatin (Crestor) 40 mg tablet TAKE ONE (1) TABLET BY MOUTH DAILY. 90 tablet 3     sirolimus (Rapamune) 0.5 mg tablet Take 1 tablet (0.5 mg) by mouth once daily. 90 tablet 3     tacrolimus (Prograf) 0.5 mg capsule Take 1 capsule (0.5 mg) by mouth 2 times a day. 60 capsule 11     tacrolimus (Prograf) 1 mg capsule Take 1 capsule (1 mg) by mouth 2 times a day. 60 capsule 3     [] traMADol (Ultram) 50 mg tablet Take 1 tablet (50 mg) by mouth every 6 hours if needed for severe pain (7 - 10). 100 tablet 0        Invasive Hemodynamics:    Most Recent Range  Past 24hrs   BP (Art) 71/58 Arterial Line BP 1  Min: 71/58  Max: 101/85   MAP(Art) 64 mmHg Arterial Line MAP 1 (mmHg)   Min: 64 mmHg  Max: 91 mmHg   RA/CVP   No data recorded   PA 41/34 No data recorded   PA(mean) 37 mmHg No data recorded   PCWP  (Unable to obtain, Provider aware) No data recorded   CO 5.5 L/min No data recorded   CI 2.8 L/min/m2 No data recorded   Mixed Venous 93 % SVO2 (%)  Min: 66.8 %  Max: 94 %   SVR  1115 (dyne*sec)/cm5 No data recorded   PVR 88 (dyne*sec)/cm5 No data recorded     MCS:   Heart Mate III:     Most Recent Range Past 24hrs   Flow   No data recorded   Speed   No data recorded   Power   No data recorded   PI   No data recorded     ECMO:     Most Recent Range Past 24hrs   Flow 3.12 Patient Flow (L/min)  Min: 3  Max: 3.63   Speed 3190 Circuit Flow (RPM)  Min: 3189  Max: 3191   Sweep 1.5 Sweep Gas Flow Rate (L/min)  Min: 0.5  Max: 2     Impella:      Most Recent Range Past 24hrs   Performance Level 2 P Level  Min: 2   Min taken time: 04/06/24 0700  Max: 2   Max taken time: 04/06/24 0700   Flow (L/min) 1 Flow (L/min)  Min: 0.6   Min taken time: 04/05/24 2300  Max: 1.2   Max taken time: 04/06/24 0600   Motor Current 163/77 Motor Current  Min: 112/80   Min taken time: 04/05/24 2200  Max: 163/77   Max taken time: 04/06/24 0700   Placement Signal Yes  Placement OK could not be evaluated. This SmartLink does not work with rows of the type: Custom List   Purge (mmHg) 401 Purge Pressure (mmHg)  Min: 400   Min taken time: 04/06/24 0400  Max: 558   Max taken time: 04/05/24 1326   Purge rate (mL/hr) 11.8 Purge Rate (mL/hr)  Min: 11.3   Min taken time: 04/05/24 1900  Max: 12   Max taken time: 04/06/24 0000     VENT:    Most Recent Range Past 24hrs   Mode Pressure support    FiO2 30 % No data recorded   Rate 12 No data recorded   Vt 350 mL  No data recorded   PEEP 5 cm H20 No data recorded         4/6/2024     7:00 AM 4/6/2024     6:00 AM 4/6/2024     5:00 AM 4/6/2024     4:00 AM 4/6/2024     3:00  "AM 4/6/2024     2:00 AM 4/6/2024     1:00 AM   Vitals   Heart Rate 119 108 119 126 127 123 126   Resp 45 53 60 68 54 59 33   Weight (lb)  185.19        BMI  35.01 kg/m2        BSA (m2)  1.9 m2          Visit Vitals  BP 92/80 (BP Location: Left arm, Patient Position: Lying) Comment: Post: 89/75   Pulse (!) 119   Temp 36.7 °C (98.1 °F) (Temporal)   Resp (!) 45   Ht 1.549 m (5' 0.98\")   Wt 84 kg (185 lb 3 oz)   SpO2 100%   BMI 35.01 kg/m²   OB Status Hysterectomy   Smoking Status Never   BSA 1.9 m²     Wt Readings from Last 5 Encounters:   04/06/24 84 kg (185 lb 3 oz)   12/07/23 92.1 kg (203 lb)   12/01/23 93 kg (205 lb)   11/29/23 92.9 kg (204 lb 12.8 oz)   11/09/23 91.3 kg (201 lb 3.2 oz)       Intake/Output Summary (Last 24 hours) at 4/6/2024 0759  Last data filed at 4/6/2024 0700  Gross per 24 hour   Intake 1327.31 ml   Output 2804 ml   Net -1476.69 ml     CHEST: Labored, Crackles  CV:  Sinus tachycardia  ABD:  Soft, Rounded Soft Bowel sounds: All quadrants, Flatus: Yes  EXT:   RLE: Appropriate for ethnicity,Warm  DP: Doppler  PT: Unable to assess  LLE: Appropriate for ethnicity,Warm  DP: Doppler  PT: Doppler  NEURO:   RASS: Drowsy  CAM: Negative  LOC: Alert  Cognition: Follows commands  GCS: 15    DATA:  CMP:  Recent Labs     04/06/24  0412 04/05/24  0145 04/04/24  0252 04/03/24  1344 04/03/24  0015 04/02/24  1551 04/02/24  0008 04/01/24  1212 04/01/24  0233 03/31/24  1156 03/31/24  0514 03/31/24  0042 03/30/24  1240 03/30/24  0746    140 138 135* 135* 134* 136 137 138 136   < >  --  135* 136   K 3.6 3.9 4.2 4.3 4.2 4.5 4.3 4.6 4.7 4.4   < >  --  4.2 3.8    102 101 101 100 101 101 102 102 101   < >  --  101 101   CO2 22 23 20* 23 22 22 27 25 27 27   < >  --  23 27   ANIONGAP 19 19 21* 15 17 16 12 15 14 12   < >  --  15 12   BUN 12 11  11 18 11 10 11 11 13 14 14   < >  --  11 12   CREATININE 0.71 0.77 1.52* 1.00 0.81 0.79 0.82 0.85 0.80 0.85   < >  --  1.00 0.99   EGFR >90 >90 46* 76 >90 >90 >90 >90 " >90 >90   < >  --  76 77   MG 2.11 2.25  --   --  2.19  --  2.29 2.29 2.45* 2.49*  --  2.52* 2.39 2.45*    < > = values in this interval not displayed.     Recent Labs     04/06/24  0412 04/05/24  0145 04/04/24  0252 04/03/24  1344 04/03/24  0015 04/02/24  1551 04/02/24  0008 04/01/24  1212 04/01/24  0233 03/31/24  1156 03/31/24  0514 03/31/24  0042 03/30/24  1240 03/19/24  0605 03/18/24  1244 03/18/24  0315 03/17/24  1533 03/24/23  0842 03/17/23  0838   ALBUMIN 3.7 3.7 3.9 4.3 4.2  4.2 4.4 4.5 4.5 4.5 3.6   < > 3.6 3.8   < >  --    < > 3.4   < > 3.8   ALT 18 17  --   --  18  --  19  --  21 29  --  34 41   < >  --    < > 71*   < > 8   AST 47* 55*  --   --  70*  --  112*  --  182* 319*  --  377* 374*   < >  --    < > 213*   < > 17   BILITOT 1.6* 1.5*  --   --  1.6*  --  2.2*  --  2.4* 4.2*  --  3.9* 3.8*   < >  --    < > 2.6*   < > 0.4   LIPASE  --   --   --   --   --   --   --   --   --   --   --   --   --   --  46  --  93*  --  8*    < > = values in this interval not displayed.     CBC:  Recent Labs     04/06/24 0412 04/05/24  1348 04/05/24  0145 04/04/24  0245 04/03/24  1344 04/03/24  0015 04/02/24  1551 04/02/24  0008   WBC 12.5* 11.7* 7.3 6.0 6.1 5.2 5.6 4.9   HGB 9.3* 9.6* 6.5* 6.9* 7.4* 6.9* 7.7* 7.0*   HCT 27.8* 28.2* 20.8* 22.3* 23.1* 22.3* 24.0* 22.4*   PLT 94* 130* 85* 62* 86* 43* 60* 33*   MCV 87 87 87 90 88 89 88 91     COAG:   Recent Labs     04/06/24  0412 04/05/24  0146 04/05/24  0145 04/04/24  0245 04/03/24  0015 04/02/24  1551 04/02/24  0008 04/01/24  1408 04/01/24  0233 03/31/24  1156 03/31/24  0042 03/30/24  1240 03/30/24  0746 03/30/24  0522 03/29/24  1330 03/29/24  1208 03/28/24  1956 03/28/24  1107 03/28/24  0557 03/28/24  0146 03/27/24  1949 03/27/24  0538 03/27/24  0351 03/26/24  0532 05/03/22  0607 04/19/22  1330   INR 2.4*  --   --   --   --   --  1.2*  --  1.2* 1.1 1.2* 1.2*  --  1.2*  --  1.3*  --  1.8*  --  2.4* 3.3*   < > 2.7* 2.9*   < >  --    ARIXTRA  --   --   --   --   --   --    --   --   --   --   --   --   --   --   --   --   --   --   --   --   --   --   --   --   --  1.36   HAUF  --  0.2  --  0.2  --  0.1 <0.1 <0.1  --  0.2  --  0.5  --   --    < >  --    < >  --    < >  --   --    < >  --   --    < >  --    HAPTOGLOBIN  --   --  <10* <10* <10*  --  <10*  --  <10*  --  <10*  --    < >  --   --   --    < >  --    < >  --   --   --  <10* <10*   < >  --    FIBRINOGEN  --   --   --   --   --   --   --   --   --   --   --   --   --   --   --   --   --  154*  --  171* 152*  --  166* 169*   < >  --     < > = values in this interval not displayed.     ABO:   Recent Labs     04/05/24  1347   ABO O     HEME/ENDO:   Recent Labs     04/01/24  0233 03/18/24  0315 03/17/24  1533 03/07/24  1402 03/04/24  0338 02/16/24  0116 07/18/23  0924 03/17/23  0838 01/18/23  1557 01/04/23  0825 12/14/22  1541 07/12/22  0722 05/20/22  1521 02/06/22  1614 02/02/22  1703 09/18/20  0715 09/14/20  1234   FERRITIN  --   --   --   --   --  308*  --   --   --   --   --   --  115  --  24  --  32   IRONSAT  --   --   --   --   --  13*  --   --  24*  --   --   --  25  --  7*   < >  --    TSH 10.13* 20.74* 15.88* 14.87*   < > 12.02*   < > 6.13*  --    < > 40.64*   < >  --    < > 0.32*   < >  --    HGBA1C  --   --   --  5.7*  --  6.3*  --  5.7*  --   --  6.4*   < >  --   --   --    < >  --     < > = values in this interval not displayed.     CARDIAC:   Recent Labs     04/06/24  0412 04/05/24  0145 04/04/24  0245 04/03/24  0015 04/02/24  0008 04/01/24  0233 03/31/24  0042 03/29/24  2102 03/08/24  0549 03/07/24  1402 02/19/24  2346 02/16/24  0116 02/15/24  2203 09/06/23  0845 03/29/23  0840 01/18/23  1557 11/10/22  2322 05/02/22  0855 04/25/22  0858   LDH 1,172* 878* 925* 1,036* 1,397* 1,908* 3,038* 2,781*   < >  --    < >  --   --   --   --    < >  --   --   --    TROPHS  --   --   --   --   --   --   --   --   --   --   --  168* 125*  --   --   --  113*  --   --    BNP  --   --   --   --   --   --   --   --   --  4,683*  --   >5,000* >5,000* 66 60  --  53 1,191* 1,797*    < > = values in this interval not displayed.     Recent Labs     04/05/24  1559 04/05/24  1500 04/05/24  1347 04/04/24  2207 04/04/24  0604 03/27/24  1011 03/21/24  1013 03/16/24  0518 03/15/24  2310 03/15/24  1643 03/15/24  1217 03/15/24  0526   LACMX  --   --   --   --   --   --   --  1.1 1.0 1.6 1.5 1.5   LACTATEART 1.0 0.8 0.9 0.9 0.7   < >  --   --   --   --   --   --    SO2MV  --   --   --   --   --   --  48 51 42* 42* 58 47    < > = values in this interval not displayed.     Recent Labs     04/05/24  0559 04/04/24  0603 04/03/24  0015 04/02/24  0558 04/01/24  0611 03/31/24  0558 03/30/24  0607 03/29/24  0530 03/21/24  0609 03/20/24  0607 03/19/24  0605 03/18/24  0315 03/17/24  0550 03/16/24  0611 03/15/24  0609 03/14/24  0623 03/13/24  0558   TACROLIMUS 4.0 3.2 4.1 3.9 <2.0 5.0 4.1 7.4   < > 3.3 3.1 3.6 3.9 4.9 4.9 8.9 5.5   SIROLIMUS  --   --   --   --   --   --   --   --   --  5.7 5.6 6.6 7.9 8.8 9.8 10.8 9.0    < > = values in this interval not displayed.     Recent Labs     02/16/24  0116 07/18/23  0924 03/17/23  0838 11/10/22  2322   CHOL 162 279* 211* 177   LDLF  --  184* 109* 81   LDLCALC 94  --   --   --    HDL 39.2 63.4 62.9 52.2   TRIG 142 158* 198* 220*     MICRO:   Recent Labs     03/27/24  1020 02/15/24  2203 01/18/23  1557   CRP 4.27* 6.06* 0.68     Susceptibility data from last 90 days.  Collected Specimen Info Organism   04/01/24 Fluid from SPUTUM Normal throat araceli       LDA:  ECMO Cannulation Peripheral Right;Femoral artery;Femoral vein (Active)   Placement Date/Time: 03/27/24 1700   ECMO Type: Peripheral  Cannulation Site: Right;Femoral artery;Femoral vein  Line Securement: Line secured in 2 locations;Securement device;Sutures   Number of days: 9       Arterial Line 03/27/24 Right Radial (Active)   Placement Date/Time: 03/27/24 1545   Orientation: Right  Location: Radial   Number of days: 9       Ventricular Assist Device Impella 5.5 (Active)    Placement Date/Time: 03/01/24 1956   Placed by: Dr Viramontes  Hand Hygiene Completed: Yes  Site Location: Axilla right  VAD Type: Left ventricular assist device  VAD Brand: Impella 5.5   Number of days: 35       Hemodialysis Cath Triple lumen Right Jugular (Active)   Earliest Known Present: 03/10/24   Hemodialysis Catheter Type: Triple lumen  Securement Method: Sutured  Patient Tolerance: Tolerated well  Orientation: Right  Access Location: Jugular   Number of days: 27     NUTRITION: Adult diet Regular  EMERGENCY CONTACT: Extended Emergency Contact Information  Primary Emergency Contact: SAHIL DIMAS  Address: [address] [place] [address]  Home Phone: [phone]  Mobile Phone: [phone]  Relation: Mother  CODE STATUS: Full Code  DISPO: Discharge Planning  Living Arrangements: Children  Support Systems: Parent  Assistance Needed: Unable to Express (Intubated/Sedated)  Type of Residence: Private residence  Who is requesting discharge planning?: Provider (discharge planning is an ongoing daily process)  Home or Post Acute Services: Other (Comment) (tbd)  Patient expects to be discharged to:: tbd  Does the patient need discharge transport arranged?: Yes  RoundTrip coordination needed?: Yes  Has discharge transport been arranged?: No  FOLLOWUP:   Future Appointments   Date Time Provider Department Center   4/6/2024  8:00 AM [facility]   4/22/2024 10:30 AM Elisabeth Acevedo PA-C [id number]   7/22/2024  2:15 PM Jennifer March MD [id number]

## 2024-04-06 NOTE — PROGRESS NOTES
"        INPATIENT TRANSPLANT NEPHROLOGY PROGRESS NOTE          REASON FOR CONSULT: CRRT mgt    SUBJECTION:  [facility] Procedure note  -No acute overnight events requiring conscious sedation otherwise stable.    ECMO, Impella.    PHYCISCAL EXAMINATION:    Visit Vitals  BP 92/80 (BP Location: Left arm, Patient Position: Lying) Comment: Post: 89/75   Pulse (!) 116   Temp 36.4 °C (97.5 °F)   Resp (!) 41   Ht 1.549 m (5' 0.98\")   Wt 84 kg (185 lb 3 oz)   SpO2 100%   BMI 35.01 kg/m²   OB Status Hysterectomy   Smoking Status Never   BSA 1.9 m²        04/04 1900 - 04/06 0659  In: 2314.1 [P.O.:690; I.V.:774.1]  Out: 3619      Weight change:     Constitutional:       General: She is not in acute distress.  Pulmonary:      Effort: Pulmonary effort is normal. No respiratory distress.      Breath sounds: Normal breath sounds.   Chest:      Comments: Right axillary impella site CDI   Abdominal:      General: Bowel sounds are normal.      Palpations: Abdomen is soft.      Tenderness: There is no abdominal tenderness.   Musculoskeletal:      -No LE edema     General: Skin is warm and dry.      Comments: Left groin ECMO cannulation site with drainage    Neurological:      General: No focal deficit present.      Mental Status: She is alert and oriented to person, place, and time.   Psychiatric:         Behavior: Behavior normal. Behavior is cooperative.     MEDICATION LIST: REVIEWED    acetaminophen, 650 mg, q6h  calcium carbonate-vitamin D3, 1 tablet, Daily  cyanocobalamin, 500 mcg, Daily  darbepoetin floyd, 100 mcg, q14 days  ferrous sulfate (325 mg ferrous sulfate), 65 mg of iron, Daily with breakfast  folic acid, 1 mg, Daily  insulin lispro, 0-5 Units, TID with meals  lactated Ringer's, 250 mL, Once  levothyroxine, 200 mcg, Daily  lidocaine, 5 mL, Once  lidocaine, 1 patch, Daily  melatonin, 10 mg, Daily  multivitamin with minerals, 1 tablet, Daily  mycophenolate, 360 mg, BID  nystatin, 5 mL, q6h  oxygen, , Continuous - " Inhalation  pantoprazole, 40 mg, Daily  perflutren lipid microspheres, 0.5-10 mL of dilution, Once in imaging  perflutren protein A microsphere, 0.5 mL, Once in imaging  piperacillin-tazobactam, 3.375 g, q6h  predniSONE, 10 mg, Daily  psyllium, 1 packet, Daily  QUEtiapine, 50 mg, Nightly  rosuvastatin, 10 mg, Nightly  sennosides-docusate sodium, 1 tablet, BID  sod phos di, mono-K phos mono, 500 mg, 4x daily  sodium chloride, 1 spray, 4x daily  sulfamethoxazole-trimethoprim, 80 mg of trimethoprim, Daily  sulfur hexafluoride microsphr, 2 mL, Once in imaging  tacrolimus, 3.5 mg, q12h TRICIA  valGANciclovir, 450 mg, q48h  vancomycin, 750 mg, q12h      [Held by provider] heparin, Last Rate: 500 Units/hr (04/06/24 0800)  lactated Ringer's, Last Rate: 5 mL/hr (04/06/24 0700)  PrismaSol 4/2.5  sodium bicarbonate infusion Impella Purge 25 mEq/1000 mL D5W, Last Rate: 10 mL/hr (04/06/24 0700)      alteplase, 2 mg, PRN  bisacodyl, 10 mg, Daily PRN  calcium gluconate, 1 g, q6h PRN  calcium gluconate, 2 g, q6h PRN  dextrose, 25 g, q15 min PRN  dextrose, 25 g, q15 min PRN   Or  glucagon, 1 mg, q15 min PRN  hydrALAZINE, 5 mg, q4h PRN  HYDROmorphone, 0.2 mg, q3h PRN  hydrOXYzine HCL, 25 mg, q6h PRN  ipratropium-albuteroL, 3 mL, q6h PRN  artificial tears, 2 drop, PRN  magnesium sulfate, 2 g, q6h PRN  magnesium sulfate, 4 g, q6h PRN  microfibrllar collagen, , PRN  mupirocin, , BID PRN  naloxone, 0.2 mg, q5 min PRN  ondansetron, 4 mg, q4h PRN  oxyCODONE, 5 mg, q4h PRN  oxygen, , Continuous PRN - O2/gases  sodium chloride, 1 spray, 4x daily PRN  vancomycin, , Daily PRN        ALLERGY:  Allergies   Allergen Reactions    Dapagliflozin GI bleeding and Bleeding     UTI    Urinary tract infection    Empagliflozin Unknown    Myfortic [Mycophenolate Sodium] GI Upset    Topiramate Nausea Only and Nausea/vomiting    Latex Rash       LABS:  Results for orders placed or performed during the hospital encounter of 02/15/24 (from the past 24 hour(s))    Type and screen   Result Value Ref Range    ABO TYPE O     Rh TYPE POS     ANTIBODY SCREEN NEG    Blood Gas Arterial Full Panel   Result Value Ref Range    POCT pH, Arterial 7.22 (LL) 7.38 - 7.42 pH    POCT pCO2, Arterial 63 (H) 38 - 42 mm Hg    POCT pO2, Arterial 226 (H) 85 - 95 mm Hg    POCT SO2, Arterial 100 94 - 100 %    POCT Oxy Hemoglobin, Arterial 96.7 94.0 - 98.0 %    POCT Hematocrit Calculated, Arterial 32.0 (L) 36.0 - 46.0 %    POCT Sodium, Arterial 131 (L) 136 - 145 mmol/L    POCT Potassium, Arterial 3.8 3.5 - 5.3 mmol/L    POCT Chloride, Arterial 101 98 - 107 mmol/L    POCT Ionized Calcium, Arterial 1.11 1.10 - 1.33 mmol/L    POCT Glucose, Arterial 141 (H) 74 - 99 mg/dL    POCT Lactate, Arterial 0.9 0.4 - 2.0 mmol/L    POCT Base Excess, Arterial -2.7 (L) -2.0 - 3.0 mmol/L    POCT HCO3 Calculated, Arterial 25.8 22.0 - 26.0 mmol/L    POCT Hemoglobin, Arterial 10.8 (L) 12.0 - 16.0 g/dL    POCT Anion Gap, Arterial 8 (L) 10 - 25 mmo/L    Patient Temperature 37.0 degrees Celsius    FiO2 90 %   CBC   Result Value Ref Range    WBC 11.7 (H) 4.4 - 11.3 x10*3/uL    nRBC 3.3 (H) 0.0 - 0.0 /100 WBCs    RBC 3.24 (L) 4.00 - 5.20 x10*6/uL    Hemoglobin 9.6 (L) 12.0 - 16.0 g/dL    Hematocrit 28.2 (L) 36.0 - 46.0 %    MCV 87 80 - 100 fL    MCH 29.6 26.0 - 34.0 pg    MCHC 34.0 32.0 - 36.0 g/dL    RDW 19.2 (H) 11.5 - 14.5 %    Platelets 130 (L) 150 - 450 x10*3/uL   Blood Gas Arterial Full Panel   Result Value Ref Range    POCT pH, Arterial 7.29 (L) 7.38 - 7.42 pH    POCT pCO2, Arterial 47 (H) 38 - 42 mm Hg    POCT pO2, Arterial 260 (H) 85 - 95 mm Hg    POCT SO2, Arterial 100 94 - 100 %    POCT Oxy Hemoglobin, Arterial 96.5 94.0 - 98.0 %    POCT Hematocrit Calculated, Arterial 29.0 (L) 36.0 - 46.0 %    POCT Sodium, Arterial 134 (L) 136 - 145 mmol/L    POCT Potassium, Arterial 3.4 (L) 3.5 - 5.3 mmol/L    POCT Chloride, Arterial 104 98 - 107 mmol/L    POCT Ionized Calcium, Arterial 0.99 (L) 1.10 - 1.33 mmol/L    POCT  Glucose, Arterial 134 (H) 74 - 99 mg/dL    POCT Lactate, Arterial 0.8 0.4 - 2.0 mmol/L    POCT Base Excess, Arterial -4.0 (L) -2.0 - 3.0 mmol/L    POCT HCO3 Calculated, Arterial 22.6 22.0 - 26.0 mmol/L    POCT Hemoglobin, Arterial 9.7 (L) 12.0 - 16.0 g/dL    POCT Anion Gap, Arterial 11 10 - 25 mmo/L    Patient Temperature 37.0 degrees Celsius    FiO2 90 %   Blood Gas Arterial Full Panel   Result Value Ref Range    POCT pH, Arterial 7.28 (L) 7.38 - 7.42 pH    POCT pCO2, Arterial 48 (H) 38 - 42 mm Hg    POCT pO2, Arterial 207 (H) 85 - 95 mm Hg    POCT SO2, Arterial 100 94 - 100 %    POCT Oxy Hemoglobin, Arterial 96.6 94.0 - 98.0 %    POCT Hematocrit Calculated, Arterial 27.0 (L) 36.0 - 46.0 %    POCT Sodium, Arterial 136 136 - 145 mmol/L    POCT Potassium, Arterial 3.5 3.5 - 5.3 mmol/L    POCT Chloride, Arterial 105 98 - 107 mmol/L    POCT Ionized Calcium, Arterial 0.98 (L) 1.10 - 1.33 mmol/L    POCT Glucose, Arterial 132 (H) 74 - 99 mg/dL    POCT Lactate, Arterial 1.0 0.4 - 2.0 mmol/L    POCT Base Excess, Arterial -4.1 (L) -2.0 - 3.0 mmol/L    POCT HCO3 Calculated, Arterial 22.6 22.0 - 26.0 mmol/L    POCT Hemoglobin, Arterial 8.9 (L) 12.0 - 16.0 g/dL    POCT Anion Gap, Arterial 12 10 - 25 mmo/L    Patient Temperature 37.0 degrees Celsius    FiO2 90 %   Coagulation Screen   Result Value Ref Range    Protime 27.1 (H) 9.8 - 12.8 seconds    INR 2.4 (H) 0.9 - 1.1    aPTT 44 (H) 27 - 38 seconds   Tacrolimus level   Result Value Ref Range    Tacrolimus  6.1 <=15.0 ng/mL   Reticulocytes   Result Value Ref Range    Retic % 4.2 (H) 0.5 - 2.0 %    Retic Absolute 0.132 (H) 0.018 - 0.083 x10*6/uL    Reticulocyte Hemoglobin 28 28 - 38 pg    Immature Retic fraction 34.5 (H) <=16.0 %   CBC and Auto Differential   Result Value Ref Range    WBC 12.5 (H) 4.4 - 11.3 x10*3/uL    nRBC 4.8 (H) 0.0 - 0.0 /100 WBCs    RBC 3.18 (L) 4.00 - 5.20 x10*6/uL    Hemoglobin 9.3 (L) 12.0 - 16.0 g/dL    Hematocrit 27.8 (L) 36.0 - 46.0 %    MCV 87 80 -  100 fL    MCH 29.2 26.0 - 34.0 pg    MCHC 33.5 32.0 - 36.0 g/dL    RDW 19.6 (H) 11.5 - 14.5 %    Platelets 94 (L) 150 - 450 x10*3/uL    Immature Granulocytes %, Automated 1.9 (H) 0.0 - 0.9 %    Immature Granulocytes Absolute, Automated 0.24 0.00 - 0.70 x10*3/uL   Lactate Dehydrogenase   Result Value Ref Range    LDH 1,172 (H) 84 - 246 U/L   Magnesium   Result Value Ref Range    Magnesium 2.11 1.60 - 2.40 mg/dL   Hepatic function panel   Result Value Ref Range    Albumin 3.7 3.4 - 5.0 g/dL    Bilirubin, Total 1.6 (H) 0.0 - 1.2 mg/dL    Bilirubin, Direct 0.8 (H) 0.0 - 0.3 mg/dL    Alkaline Phosphatase 166 (H) 33 - 110 U/L    ALT 18 7 - 45 U/L    AST 47 (H) 9 - 39 U/L    Total Protein 5.6 (L) 6.4 - 8.2 g/dL   BUN   Result Value Ref Range    Urea Nitrogen 12 6 - 23 mg/dL   Creatinine, Serum   Result Value Ref Range    Creatinine 0.71 0.50 - 1.05 mg/dL    eGFR >90 >60 mL/min/1.73m*2   Electrolyte panel   Result Value Ref Range    Sodium 138 136 - 145 mmol/L    Potassium 3.6 3.5 - 5.3 mmol/L    Chloride 101 98 - 107 mmol/L    Bicarbonate 22 21 - 32 mmol/L    Anion Gap 19 10 - 20 mmol/L   Calcium, ionized   Result Value Ref Range    POCT Calcium, Ionized 1.00 (L) 1.1 - 1.33 mmol/L   Phosphorus   Result Value Ref Range    Phosphorus 3.7 2.5 - 4.9 mg/dL   Manual Differential   Result Value Ref Range    Neutrophils %, Manual 67.5 40.0 - 80.0 %    Bands %, Manual 22.3 0.0 - 5.0 %    Lymphocytes %, Manual 3.4 13.0 - 44.0 %    Monocytes %, Manual 3.4 2.0 - 10.0 %    Eosinophils %, Manual 0.0 0.0 - 6.0 %    Basophils %, Manual 0.0 0.0 - 2.0 %    Atypical Lymphocytes %, Manual 3.4 0.0 - 2.0 %    Seg Neutrophils Absolute, Manual 8.44 (H) 1.20 - 7.00 x10*3/uL    Bands Absolute, Manual 2.79 (H) 0.00 - 0.70 x10*3/uL    Lymphocytes Absolute, Manual 0.43 (L) 1.20 - 4.80 x10*3/uL    Monocytes Absolute, Manual 0.43 0.10 - 1.00 x10*3/uL    Eosinophils Absolute, Manual 0.00 0.00 - 0.70 x10*3/uL    Basophils Absolute, Manual 0.00 0.00 - 0.10  x10*3/uL    Atypical Lymphs Absolute, Manual 0.43 0.00 - 0.50 x10*3/uL    Total Cells Counted 117     Neutrophils Absolute, Manual 11.23 (H) 1.20 - 7.70 x10*3/uL    RBC Morphology See Below     Polychromasia Mild     RBC Fragments Few     Descanso Cells Few     Acanthocytes Few    Type and screen   Result Value Ref Range    ABO TYPE O     Rh TYPE POS     ANTIBODY SCREEN NEG    POCT GLUCOSE   Result Value Ref Range    POCT Glucose 110 (H) 74 - 99 mg/dL   Hepatic function panel   Result Value Ref Range    Albumin 3.6 3.4 - 5.0 g/dL    Bilirubin, Total 1.7 (H) 0.0 - 1.2 mg/dL    Bilirubin, Direct 0.8 (H) 0.0 - 0.3 mg/dL    Alkaline Phosphatase 166 (H) 33 - 110 U/L    ALT 17 7 - 45 U/L    AST 44 (H) 9 - 39 U/L    Total Protein 5.6 (L) 6.4 - 8.2 g/dL   Coagulation Screen   Result Value Ref Range    Protime 27.3 (H) 9.8 - 12.8 seconds    INR 2.4 (H) 0.9 - 1.1    aPTT 44 (H) 27 - 38 seconds   Coagulation Screen   Result Value Ref Range    Protime 28.1 (H) 9.8 - 12.8 seconds    INR 2.5 (H) 0.9 - 1.1    aPTT 44 (H) 27 - 38 seconds   Heparin Assay, UFH   Result Value Ref Range    Heparin Unfractionated <0.1 See Comment Below for Therapeutic Ranges IU/mL     *Note: Due to a large number of results and/or encounters for the requested time period, some results have not been displayed. A complete set of results can be found in Results Review.        ASSESSMENT AND PLAN:    Ms. Yimi Bowles is a 34F with a PMHx sig for stage D HFrEF (PPCM) s/p ICD s/p CardioMEMs, hypothyroidism 2/2 thyroidectomy, obesity s/p gastric bypass (2017), and SLE who is s/p OHT (3/31/2022) with a post-OHT course complicated by RIJ/RUE DVTs, leukopenia, left groin seroma, and asymptomatic 2R ACR (11/2022) treated with steroids, s/p total hysterectomy (2023), Flu A (1/2024).  Who initially presented on 2/15/2024 in the setting of acute systolic heart failure with the graft failure and cardiogenic shock.  Patient currently is on [facility] ECMO support and Impella with  ongoing acute renal failure requiring CRRT.    Principal Problem:    Cardiogenic shock (CMS/HCC)  Active Problems:    Heart transplant recipient (CMS/[facility])    ESRD (end stage renal disease) on dialysis (CMS/HCC)    HIRAM (acute kidney injury) (CMS/[facility])    Acute passive congestion of liver    Hyponatremia    Iron deficiency anemia    Abdominal pain    Cardiac transplant rejection (CMS/HCC)    Acute combined systolic (congestive) and diastolic (congestive) heart failure (CMS/HCC)      CRRT Management Note:  #HIRAM-D, 2/2 ATN in setting of cardiogenic shock. Started on CRRT initially 2/19 and transitioned to iHD however unable to achieve optimal fluid management so transitioned back to CRRT   - Hemodynamically stable with pt goal fluid removal -500ml/day. Will continue CVVH 4K. Pt remains on ECMO.   -Please replete ionized calcium and phosphorus as per the CRRT protocol.  -Of note patient is currently status 1 for heart kidney transplant listed in your nose on 4/2/2024.    Thank you for consulting.

## 2024-04-06 NOTE — PROCEDURES
Central Line Insertion Practices/2nd Observer Note   Name:  Charla Bowles  Admission Date:  2/15/2024  9:21 PM  MRN: [id number]  Attending Provider: Antonio Witt MD  Room/Bed:               Sex: female  : 1990       Age: 34 y.o.    Pre-Procedure Checklist  Emergent Line Insertion: No  Type of Line to be Placed: Hemodialysis  Consent Obtained: Yes  Emergency Medication Necessary: No  Patient Identified with 2 Independent Identifiers: Yes  Review of Allergies, Anticoagulation, Relevant Labs, ECG/Telemetry: Yes  Risks/Benefits/Alternatives Discussed with Patient/POA/Legal Representative: Yes  Stop Sign on Door: Yes  Time Out Performed: Yes  Catheter Exchange: No  Positioning and Preparation Checklist  All People, Including Patient, in the Room with Cap and Mask: Yes  Fluoroscopy Used to Identify Vessel and Guide Insertion; Sterile Cover Used: No  Ultrasound Used to Identify Vessel and Guide Insertion; Sterile Cover Used: Yes  Full Barrier Precautions Followed (Mask, Cap, Gown, Gloves): Yes  Hands Washed: Yes  Monitors Attached with Sound Alarms On: Yes  Full Body Sterile Drape (Head-to-Toe) Used to Cover Patient: Yes  [place] Position (For IJ and Subclavian): Yes  CHG Skin Prep Used and Allowed to Air Dry Prior to Skin Procedure: Yes  Procedure Checklist  Blood Aspirated From All Lumens, All Ports Subsequently Flushed: Yes  Catheter Caps Placed On All Lumens; Lumens Clamped: Yes  Maintain Guidewire Control Throughout, Ensuring Guidewire Removal: Yes  Maintain Sterile Field Throughout Insertion: Yes  Catheter Secured: Yes  Post-Procedure Checklist  Date and Time Written on Dressing: Yes  Sharp and Wire Count and Safe Disposal of all Sharps/Wires: Yes  Sterile Dressing Applied Per Protocol: Yes  X-ray Ordered or ECG Image: Yes    Cynthia Joe RN  Date: 2024  Time: 2:31 PM

## 2024-04-06 NOTE — CONSULTS
"Vancomycin Dosing by Pharmacy- INITIAL    Charla MONTELONGO Yimi Bowles is a 34 y.o. year old female who Pharmacy has been consulted for vancomycin dosing for pneumonia. Based on the patient's indication and renal status this patient will be dosed based on a goal trough/random level of 15-20.     Renal function is currently CVVH dependent.    Visit Vitals  BP 92/80 (BP Location: Left arm, Patient Position: Lying) Comment: Post: 89/75   Pulse (!) 119   Temp 36.4 °C (97.5 °F)   Resp (!) 45        Lab Results   Component Value Date    CREATININE 0.71 04/06/2024    CREATININE 0.77 04/05/2024    CREATININE 1.52 (H) 04/04/2024    CREATININE 1.00 04/03/2024        Patient weight is No results found for: \"PTWEIGHT\"    No results found for: \"CULTURE\"     I/O last 3 completed shifts:  In: 2314.1 (27.5 mL/kg) [P.O.:690; I.V.:774.1 (9.2 mL/kg); Blood:700; IV Piggyback:150]  Out: 3619 (43.1 mL/kg) [Other:3619]  Weight: 84 kg   [unfilled]    Lab Results   Component Value Date    PATIENTTEMP 37.0 04/05/2024    PATIENTTEMP 37.0 04/05/2024    PATIENTTEMP 37.0 04/05/2024          Assessment/Plan     Patient will not be given a loading dose.  Will initiate vancomycin maintenance,  750 mg every 12 hours  Follow-up level will be ordered on 4/7/24 at 0500, unless clinically indicated sooner.  Will continue to monitor renal function daily while on vancomycin and order serum creatinine at least every 48 hours if not already ordered.  Follow for continued vancomycin needs, clinical response, and signs/symptoms of toxicity.       Rachna Shearer PharmD       "

## 2024-04-06 NOTE — CARE PLAN
The patient's goals for the shift include  Maintain hemodynamic stability while trying to achieve -500 ml fluid balance    The clinical goals for the shift include fluid removal via CRRT, maintain hemodynamic stability, improve air exchange and reduce respiratory strain    Over the shift, the patient did not make progress toward the following goals. Barriers to progression include MAP 60-65 which required decreasing CVVH fluid removal. Pt being monitored for possible pneumonia. Recommendations to address these barriers include Run even fluid balance until BP improves.

## 2024-04-07 NOTE — PROGRESS NOTES
"        INPATIENT TRANSPLANT NEPHROLOGY PROGRESS NOTE          REASON FOR CONSULT: CRRT mgt    SUBJECTION:  [facility] Procedure note  -Patient on and off confused otherwise been on conscious sedation during nighttime.    ECMO-3.17 L flow/100%/sweep 1.5, Impella 5.5 P2 with flows approximately 0.8 L    PHYCISCAL EXAMINATION:    Visit Vitals  BP 92/80 (BP Location: Left arm, Patient Position: Lying) Comment: Post: 89/75   Pulse (!) 114   Temp 36.6 °C (97.9 °F) (Temporal)   Resp (!) 30   Ht 1.549 m (5' 0.98\")   Wt 84 kg (185 lb 3 oz)   SpO2 100%   BMI 35.01 kg/m²   OB Status Hysterectomy   Smoking Status Never   BSA 1.9 m²        04/05 1900 - 04/07 0659  In: 1986.6 [I.V.:703.3]  Out: 2532      Weight change:     Constitutional:       General: She is not in acute distress.  Pulmonary:      Effort: Pulmonary effort is normal. No respiratory distress.      Breath sounds: Normal breath sounds.   Chest:      Comments: Right axillary impella site CDI   Abdominal:      General: Bowel sounds are normal.      Palpations: Abdomen is soft.      Tenderness: There is no abdominal tenderness.   Musculoskeletal:      -No LE edema     General: Skin is warm and dry.      Comments: Left groin ECMO cannulation site with drainage    Neurological:      General: No focal deficit present.      Mental Status: She is alert and oriented to person, place, and time.   Psychiatric:         Behavior: Behavior normal. Behavior is cooperative.     MEDICATION LIST: REVIEWED    acetaminophen, 650 mg, q6h  bisacodyl, 10 mg, Once  calcium carbonate-vitamin D3, 1 tablet, Daily  cyanocobalamin, 500 mcg, Daily  darbepoetin floyd, 100 mcg, q14 days  ferrous sulfate (325 mg ferrous sulfate), 65 mg of iron, Daily with breakfast  folic acid, 1 mg, Daily  insulin lispro, 0-5 Units, TID with meals  levothyroxine, 200 mcg, Daily  lidocaine, 1 patch, Daily  melatonin, 10 mg, Daily  meropenem, 2 g, q12h  methocarbamol, 500 mg, q6h TRICIA  micafungin, 100 mg, " q24h  multivitamin with minerals, 1 tablet, Daily  mycophenolate, 360 mg, BID  nystatin, 5 mL, q6h  oxygen, , Continuous - Inhalation  pantoprazole, 40 mg, Daily  predniSONE, 10 mg, Daily  psyllium, 1 packet, Daily  QUEtiapine, 50 mg, Nightly  rosuvastatin, 10 mg, Nightly  sennosides-docusate sodium, 1 tablet, BID  sod phos di, mono-K phos mono, 500 mg, 4x daily  sodium chloride, 1 spray, 4x daily  sulfamethoxazole-trimethoprim, 80 mg of trimethoprim, Daily  tacrolimus, 3.5 mg, q24h  tacrolimus, 4 mg, q24h  thiamine, 500 mg, TID  valGANciclovir, 450 mg, q48h  vancomycin, 1,000 mg, q12h      dexmedeTOMIDine, Last Rate: Stopped (04/07/24 0630)  [Held by provider] heparin, Last Rate: 500 Units/hr (04/06/24 0800)  lactated Ringer's, Last Rate: 5 mL/hr (04/07/24 0816)  PrismaSol 4/2.5  sodium bicarbonate infusion Impella Purge 25 mEq/1000 mL D5W, Last Rate: 10 mL/hr (04/07/24 0816)      alteplase, 2 mg, PRN  bisacodyl, 10 mg, Daily PRN  calcium gluconate, 1 g, q6h PRN  calcium gluconate, 2 g, q6h PRN  dextrose, 25 g, q15 min PRN  dextrose, 25 g, q15 min PRN   Or  glucagon, 1 mg, q15 min PRN  hydrALAZINE, 5 mg, q4h PRN  hydrOXYzine HCL, 25 mg, q6h PRN  ipratropium-albuteroL, 3 mL, q6h PRN  artificial tears, 2 drop, PRN  magnesium sulfate, 2 g, q6h PRN  magnesium sulfate, 4 g, q6h PRN  microfibrllar collagen, , PRN  mupirocin, , BID PRN  naloxone, 0.2 mg, q5 min PRN  ondansetron, 4 mg, q4h PRN  oxyCODONE, 5 mg, q6h PRN  oxygen, , Continuous PRN - O2/gases  sodium chloride, 1 spray, 4x daily PRN  vancomycin, , Daily PRN        ALLERGY:  Allergies   Allergen Reactions    Dapagliflozin GI bleeding and Bleeding     UTI    Urinary tract infection    Empagliflozin Unknown    Myfortic [Mycophenolate Sodium] GI Upset    Topiramate Nausea Only and Nausea/vomiting    Latex Rash       LABS:  Results for orders placed or performed during the hospital encounter of 02/15/24 (from the past 24 hour(s))   CBC   Result Value Ref Range     WBC 16.2 (H) 4.4 - 11.3 x10*3/uL    nRBC 3.8 (H) 0.0 - 0.0 /100 WBCs    RBC 3.08 (L) 4.00 - 5.20 x10*6/uL    Hemoglobin 9.0 (L) 12.0 - 16.0 g/dL    Hematocrit 27.2 (L) 36.0 - 46.0 %    MCV 88 80 - 100 fL    MCH 29.2 26.0 - 34.0 pg    MCHC 33.1 32.0 - 36.0 g/dL    RDW 19.8 (H) 11.5 - 14.5 %    Platelets 149 (L) 150 - 450 x10*3/uL   Renal Function Panel   Result Value Ref Range    Glucose 138 (H) 74 - 99 mg/dL    Sodium 137 136 - 145 mmol/L    Potassium 3.8 3.5 - 5.3 mmol/L    Chloride 101 98 - 107 mmol/L    Bicarbonate 22 21 - 32 mmol/L    Anion Gap 18 10 - 20 mmol/L    Urea Nitrogen 12 6 - 23 mg/dL    Creatinine 0.77 0.50 - 1.05 mg/dL    eGFR >90 >60 mL/min/1.73m*2    Calcium 7.5 (L) 8.6 - 10.6 mg/dL    Phosphorus 3.4 2.5 - 4.9 mg/dL    Albumin 3.5 3.4 - 5.0 g/dL   Calcium, ionized   Result Value Ref Range    POCT Calcium, Ionized 1.02 (L) 1.1 - 1.33 mmol/L   Magnesium   Result Value Ref Range    Magnesium 2.17 1.60 - 2.40 mg/dL   POCT GLUCOSE   Result Value Ref Range    POCT Glucose 129 (H) 74 - 99 mg/dL   Transthoracic Echo (TTE) Limited   Result Value Ref Range    LVIDd 4.80 cm    RVSP 33.9 mmHg   Prepare Plasma: 2 Units   Result Value Ref Range    PRODUCT CODE V4218X88     Unit Number [id number]     Unit ABO O     Unit RH POS     Dispense Status XM     Blood Expiration Date April 11, 2024 00:45 EDT     PRODUCT BLOOD TYPE 5100     UNIT VOLUME 339     PRODUCT CODE A1828H69     Unit Number [id number]     Unit ABO O     Unit RH POS     Dispense Status XM     Blood Expiration Date April 11, 2024 00:45 EDT     PRODUCT BLOOD TYPE 5100     UNIT VOLUME 342    Blood Gas Arterial Full Panel   Result Value Ref Range    POCT pH, Arterial 7.43 (H) 7.38 - 7.42 pH    POCT pCO2, Arterial 29 (L) 38 - 42 mm Hg    POCT pO2, Arterial 121 (H) 85 - 95 mm Hg    POCT SO2, Arterial 100 94 - 100 %    POCT Oxy Hemoglobin, Arterial 95.6 94.0 - 98.0 %    POCT Hematocrit Calculated, Arterial 26.0 (L) 36.0 - 46.0 %    POCT Sodium,  Arterial 133 (L) 136 - 145 mmol/L    POCT Potassium, Arterial 3.6 3.5 - 5.3 mmol/L    POCT Chloride, Arterial 103 98 - 107 mmol/L    POCT Ionized Calcium, Arterial 0.96 (L) 1.10 - 1.33 mmol/L    POCT Glucose, Arterial 155 (H) 74 - 99 mg/dL    POCT Lactate, Arterial 1.6 0.4 - 2.0 mmol/L    POCT Base Excess, Arterial -4.4 (L) -2.0 - 3.0 mmol/L    POCT HCO3 Calculated, Arterial 19.2 (L) 22.0 - 26.0 mmol/L    POCT Hemoglobin, Arterial 8.7 (L) 12.0 - 16.0 g/dL    POCT Anion Gap, Arterial 14 10 - 25 mmo/L    Patient Temperature 37.0 degrees Celsius    FiO2 21 %   Tacrolimus level   Result Value Ref Range    Tacrolimus  7.8 <=15.0 ng/mL   Reticulocytes   Result Value Ref Range    Retic % 4.0 (H) 0.5 - 2.0 %    Retic Absolute 0.103 (H) 0.018 - 0.083 x10*6/uL    Reticulocyte Hemoglobin 29 28 - 38 pg    Immature Retic fraction 30.7 (H) <=16.0 %   CBC and Auto Differential   Result Value Ref Range    WBC 11.1 4.4 - 11.3 x10*3/uL    nRBC 2.8 (H) 0.0 - 0.0 /100 WBCs    RBC 2.54 (L) 4.00 - 5.20 x10*6/uL    Hemoglobin 7.7 (L) 12.0 - 16.0 g/dL    Hematocrit 23.1 (L) 36.0 - 46.0 %    MCV 91 80 - 100 fL    MCH 30.3 26.0 - 34.0 pg    MCHC 33.3 32.0 - 36.0 g/dL    RDW 20.2 (H) 11.5 - 14.5 %    Platelets 106 (L) 150 - 450 x10*3/uL    Immature Granulocytes %, Automated 1.7 (H) 0.0 - 0.9 %    Immature Granulocytes Absolute, Automated 0.19 0.00 - 0.70 x10*3/uL   Lactate Dehydrogenase   Result Value Ref Range    LDH 1,387 (H) 84 - 246 U/L   Hepatic function panel   Result Value Ref Range    Albumin 3.5 3.4 - 5.0 g/dL    Bilirubin, Total 1.7 (H) 0.0 - 1.2 mg/dL    Bilirubin, Direct 0.9 (H) 0.0 - 0.3 mg/dL    Alkaline Phosphatase 151 (H) 33 - 110 U/L    ALT 15 7 - 45 U/L    AST 46 (H) 9 - 39 U/L    Total Protein 5.3 (L) 6.4 - 8.2 g/dL   ECMO VENOUS FULL PANEL   Result Value Ref Range    POCT pH, Venous ECMO 7.27 Reference range not established pH    POCT pCO2, Venous ECMO 56 Reference range not established mm Hg    POCT pO2,  Venous  ECMO 184  Reference range not established mm Hg    POCT SO2, Venous ECMO 100 Reference range not established %    POCT Oxy Hemoglobin, Venous ECMO 96.2 Reference range not established %    POCT Hematocrit Calculated, Venous ECMO 23.0 (L) 36.0 - 46.0 %    POCT Sodium, Venous ECMO 135 (L) 136 - 145 mmol/L    POCT Potassium, Venous ECMO 3.9 3.5 - 5.3 mmol/L    POCT Chloride, Venous ECMO 101 98 - 107 mmol/L    POCT Ionized Calcium, Venous ECMO 1.10 1.10 - 1.33 mmol/L    POCT Glucose, Venous ECMO 146 (H) 74 - 99 mg/dL    POCT Lactate Venous ECMO 1.1 0.4 - 2.0 mmol/L    POCT Base Excess, Venous ECMO -1.4 Reference range not established mmol/L    POCT HCO3 Calculated, Venous ECMO 25.7 Reference range not established mmol/L    POCT Hemoglobin, Venous ECMO 7.8 (L) 12.0 - 16.0 g/dL    POCT Anion Gap, Venous ECMO 12 Reference range not established mmo/L    Patient Temperature 37.0 degrees Celsius    FiO2 90 %   Coagulation Screen   Result Value Ref Range    Protime 30.5 (H) 9.8 - 12.8 seconds    INR 2.7 (H) 0.9 - 1.1    aPTT 52 (H) 27 - 38 seconds   Basic Metabolic Panel   Result Value Ref Range    Glucose 146 (H) 74 - 99 mg/dL    Sodium 135 (L) 136 - 145 mmol/L    Potassium 4.0 3.5 - 5.3 mmol/L    Chloride 99 98 - 107 mmol/L    Bicarbonate 23 21 - 32 mmol/L    Anion Gap 17 10 - 20 mmol/L    Urea Nitrogen 14 6 - 23 mg/dL    Creatinine 0.70 0.50 - 1.05 mg/dL    eGFR >90 >60 mL/min/1.73m*2    Calcium 7.5 (L) 8.6 - 10.6 mg/dL   Phosphorus   Result Value Ref Range    Phosphorus 3.2 2.5 - 4.9 mg/dL   Manual Differential   Result Value Ref Range    Neutrophils %, Manual 88.8 40.0 - 80.0 %    Bands %, Manual 8.6 0.0 - 5.0 %    Lymphocytes %, Manual 0.0 13.0 - 44.0 %    Monocytes %, Manual 1.7 2.0 - 10.0 %    Eosinophils %, Manual 0.9 0.0 - 6.0 %    Basophils %, Manual 0.0 0.0 - 2.0 %    Seg Neutrophils Absolute, Manual 9.86 (H) 1.20 - 7.00 x10*3/uL    Bands Absolute, Manual 0.95 (H) 0.00 - 0.70 x10*3/uL    Lymphocytes Absolute, Manual 0.00 (L)  1.20 - 4.80 x10*3/uL    Monocytes Absolute, Manual 0.19 0.10 - 1.00 x10*3/uL    Eosinophils Absolute, Manual 0.10 0.00 - 0.70 x10*3/uL    Basophils Absolute, Manual 0.00 0.00 - 0.10 x10*3/uL    Total Cells Counted 116     Neutrophils Absolute, Manual 10.81 (H) 1.20 - 7.70 x10*3/uL    RBC Morphology See Below     Polychromasia Mild     RBC Fragments Few     Alex Cells Few     Acanthocytes Few    ECMO ARTERIAL FULL PANEL   Result Value Ref Range    POCT pH, Arterial ECMO 7.28 Reference range not established pH    POCT pCO2, Arterial ECMO 53 Reference range not established mm Hg    POCT pO2,  Arterial ECMO 297 Reference range not established mm Hg    POCT SO2, Arterial ECMO 100 Reference range not established %    POCT Oxy Hemoglobin, Arterial ECMO 95.6 Reference range not established %    POCT Hematocrit Calculated, Arterial ECMO 24.0 (L) 36.0 - 46.0 %    POCT Sodium, Arterial  ECMO 133 (L) 136 - 145 mmol/L    POCT Potassium, Arterial  ECMO 3.8 3.5 - 5.3 mmol/L    POCT Chloride, Arterial  ECMO 100 98 - 107 mmol/L    POCT Ionized Calcium, Arterial  ECMO 1.07 (L) 1.10 - 1.33 mmol/L    POCT Glucose, Arterial  ECMO 153 (H) 74 - 99 mg/dL    POCT Lactate Arterial  ECMO 0.8 0.4 - 2.0 mmol/L    POCT Base Excess, Arterial ECMO -1.9 Reference range not established mmol/L    POCT HCO3 Calculated, Arterial ECMO 24.9 Reference range not established mmol/L    POCT Hemoglobin, Arterial  ECMO 7.9 (L) 12.0 - 16.0 g/dL    POCT Anion Gap, Arterial  ECMO 12 Reference range not established mmo/L    Patient Temperature 37.0 degrees Celsius    FiO2 90 %   Blood Gas Arterial Full Panel   Result Value Ref Range    POCT pH, Arterial 7.40 7.38 - 7.42 pH    POCT pCO2, Arterial 37 (L) 38 - 42 mm Hg    POCT pO2, Arterial 220 (H) 85 - 95 mm Hg    POCT SO2, Arterial 100 94 - 100 %    POCT Oxy Hemoglobin, Arterial 95.9 94.0 - 98.0 %    POCT Hematocrit Calculated, Arterial 23.0 (L) 36.0 - 46.0 %    POCT Sodium, Arterial 133 (L) 136 - 145 mmol/L     POCT Potassium, Arterial 3.7 3.5 - 5.3 mmol/L    POCT Chloride, Arterial 101 98 - 107 mmol/L    POCT Ionized Calcium, Arterial 1.02 (L) 1.10 - 1.33 mmol/L    POCT Glucose, Arterial 145 (H) 74 - 99 mg/dL    POCT Lactate, Arterial 1.0 0.4 - 2.0 mmol/L    POCT Base Excess, Arterial -1.7 -2.0 - 3.0 mmol/L    POCT HCO3 Calculated, Arterial 22.9 22.0 - 26.0 mmol/L    POCT Hemoglobin, Arterial 7.8 (L) 12.0 - 16.0 g/dL    POCT Anion Gap, Arterial 13 10 - 25 mmo/L    Patient Temperature 37.0 degrees Celsius    FiO2 21 %   ECMO ARTERIAL FULL PANEL   Result Value Ref Range    POCT pH, Arterial ECMO 7.28 Reference range not established pH    POCT pCO2, Arterial ECMO 52 Reference range not established mm Hg    POCT pO2,  Arterial ECMO 303 Reference range not established mm Hg    POCT SO2, Arterial ECMO 100 Reference range not established %    POCT Oxy Hemoglobin, Arterial ECMO 95.7 Reference range not established %    POCT Hematocrit Calculated, Arterial ECMO 24.0 (L) 36.0 - 46.0 %    POCT Sodium, Arterial  ECMO 133 (L) 136 - 145 mmol/L    POCT Potassium, Arterial  ECMO 3.8 3.5 - 5.3 mmol/L    POCT Chloride, Arterial  ECMO 100 98 - 107 mmol/L    POCT Ionized Calcium, Arterial  ECMO 1.08 (L) 1.10 - 1.33 mmol/L    POCT Glucose, Arterial  ECMO 152 (H) 74 - 99 mg/dL    POCT Lactate Arterial  ECMO 0.7 0.4 - 2.0 mmol/L    POCT Base Excess, Arterial ECMO -2.4 Reference range not established mmol/L    POCT HCO3 Calculated, Arterial ECMO 24.4 Reference range not established mmol/L    POCT Hemoglobin, Arterial  ECMO 7.9 (L) 12.0 - 16.0 g/dL    POCT Anion Gap, Arterial  ECMO 12 Reference range not established mmo/L    Patient Temperature 37.0 degrees Celsius    FiO2 90 %   ECMO VENOUS FULL PANEL   Result Value Ref Range    POCT pH, Venous ECMO 7.27 Reference range not established pH    POCT pCO2, Venous ECMO 55 Reference range not established mm Hg    POCT pO2,  Venous  ECMO 39 Reference range not established mm Hg    POCT SO2, Venous ECMO  73 Reference range not established %    POCT Oxy Hemoglobin, Venous ECMO 70.3 Reference range not established %    POCT Hematocrit Calculated, Venous ECMO 23.0 (L) 36.0 - 46.0 %    POCT Sodium, Venous ECMO 134 (L) 136 - 145 mmol/L    POCT Potassium, Venous ECMO 3.8 3.5 - 5.3 mmol/L    POCT Chloride, Venous ECMO 101 98 - 107 mmol/L    POCT Ionized Calcium, Venous ECMO 1.09 (L) 1.10 - 1.33 mmol/L    POCT Glucose, Venous ECMO 152 (H) 74 - 99 mg/dL    POCT Lactate Venous ECMO 0.7 0.4 - 2.0 mmol/L    POCT Base Excess, Venous ECMO -1.7 Reference range not established mmol/L    POCT HCO3 Calculated, Venous ECMO 25.3 Reference range not established mmol/L    POCT Hemoglobin, Venous ECMO 7.6 (L) 12.0 - 16.0 g/dL    POCT Anion Gap, Venous ECMO 12 Reference range not established mmo/L    Patient Temperature 37.0 degrees Celsius    FiO2 90 %   Fibrinogen   Result Value Ref Range    Fibrinogen 550 (H) 200 - 400 mg/dL   POCT GLUCOSE   Result Value Ref Range    POCT Glucose 153 (H) 74 - 99 mg/dL   Vancomycin   Result Value Ref Range    Vancomycin 13.5 5.0 - 20.0 ug/mL   Blood Culture    Specimen: Peripheral Venipuncture; Blood culture   Result Value Ref Range    Blood Culture Loaded on Instrument - Culture in progress    Blood Culture    Specimen: Peripheral Venipuncture; Blood culture   Result Value Ref Range    Blood Culture Loaded on Instrument - Culture in progress    Blood Gas Arterial Full Panel   Result Value Ref Range    POCT pH, Arterial 7.34 (L) 7.38 - 7.42 pH    POCT pCO2, Arterial 42 38 - 42 mm Hg    POCT pO2, Arterial 213 (H) 85 - 95 mm Hg    POCT SO2, Arterial 100 94 - 100 %    POCT Oxy Hemoglobin, Arterial 95.8 94.0 - 98.0 %    POCT Hematocrit Calculated, Arterial 23.0 (L) 36.0 - 46.0 %    POCT Sodium, Arterial 133 (L) 136 - 145 mmol/L    POCT Potassium, Arterial 3.7 3.5 - 5.3 mmol/L    POCT Chloride, Arterial 101 98 - 107 mmol/L    POCT Ionized Calcium, Arterial 1.08 (L) 1.10 - 1.33 mmol/L    POCT Glucose, Arterial  157 (H) 74 - 99 mg/dL    POCT Lactate, Arterial 1.0 0.4 - 2.0 mmol/L    POCT Base Excess, Arterial -2.9 (L) -2.0 - 3.0 mmol/L    POCT HCO3 Calculated, Arterial 22.7 22.0 - 26.0 mmol/L    POCT Hemoglobin, Arterial 7.8 (L) 12.0 - 16.0 g/dL    POCT Anion Gap, Arterial 13 10 - 25 mmo/L    Patient Temperature 37.0 degrees Celsius    FiO2 5 %     *Note: Due to a large number of results and/or encounters for the requested time period, some results have not been displayed. A complete set of results can be found in Results Review.        ASSESSMENT AND PLAN:    Ms. Yimi Bowles is a 34F with a PMHx sig for stage D HFrEF (PPCM) s/p ICD s/p CardioMEMs, hypothyroidism 2/2 thyroidectomy, obesity s/p gastric bypass (2017), and SLE who is s/p OHT (3/31/2022) with a post-OHT course complicated by RIJ/RUE DVTs, leukopenia, left groin seroma, and asymptomatic 2R ACR (11/2022) treated with steroids, s/p total hysterectomy (2023), Flu A (1/2024).  Who initially presented on 2/15/2024 in the setting of acute systolic heart failure with the graft failure and cardiogenic shock.  Patient currently is on VA ECMO support and Impella with ongoing acute renal failure requiring CRRT.    Principal Problem:    Cardiogenic shock (CMS/[facility])  Active Problems:    Heart transplant recipient (CMS/[facility])    ESRD (end stage renal disease) on dialysis (CMS/[facility])    HIRAM (acute kidney injury) (CMS/[facility])    Acute passive congestion of liver    Hyponatremia    Iron deficiency anemia    Abdominal pain    Cardiac transplant rejection (CMS/[facility])    Acute combined systolic (congestive) and diastolic (congestive) heart failure (CMS/[facility])      CRRT Management Note:  #HIRAM-D, 2/2 ATN in setting of cardiogenic shock. Started on CRRT initially 2/19 and transitioned to iHD however unable to achieve optimal fluid management so transitioned back to CRRT   - Hemodynamically stable on full vent support with ECMO and Impella. Goal fluid removal - aim even to maintain fluid balance..  Will continue [facility] 4K.   -Please replete ionized calcium and phosphorus as per the CRRT protocol.  -Of note patient is currently status 7 for heart kidney transplant listed  on 4/2/2024.  Secondary to sepsis versus pneumonia.    Thank you for consulting.

## 2024-04-07 NOTE — PROGRESS NOTES
"Charla Bowles is a 34 y.o. female on day 52 of admission presenting with Cardiogenic shock (CMS/HCC).    Subjective   Laying in bed, oriented x3 this morning; CAM positive per team but more appropriate in conversation compared to yesterday. Ill appearing, tachypneic.        Objective     Physical Exam  Laying in bed, not in acute distress   Tachypneic, on nasal cannula   VA ECMO in place   Right axillary impella in place   Abdomen soft and non-distended   Oriented, conversant but somewhat somnolent     Last Recorded Vitals  Blood pressure 92/80, pulse (!) 117, temperature 36.5 °C (97.7 °F), temperature source Temporal, resp. rate (!) 29, height 1.549 m (5' 0.98\"), weight 84 kg (185 lb 3 oz), SpO2 100 %.  Intake/Output last 3 Shifts:  I/O last 3 completed shifts:  In: 1986.6 (23.7 mL/kg) [I.V.:703.3 (8.4 mL/kg); IV Piggyback:1283.3]  Out: 2532 (30.1 mL/kg) [Other:2532]  Weight: 84 kg     Assessment/Plan   Principal Problem:    Cardiogenic shock (CMS/HCC)  Active Problems:    Heart transplant recipient (CMS/HCC)    ESRD (end stage renal disease) on dialysis (CMS/HCC)    Immunosuppression (CMS/HCC)    HIRAM (acute kidney injury) (CMS/HCC)    Acute passive congestion of liver    Hyponatremia    Iron deficiency anemia    Abdominal pain    Systolic congestive heart failure (CMS/HCC)    History of left ventricular assist device (CMS/HCC)    History of extracorporeal membrane oxygenation treatment    Moderate protein-calorie malnutrition (CMS/HCC)    Heart transplant as cause of abnormal reaction or later complication (CMS/HCC)    Cardiac transplant rejection (CMS/HCC)    Acute combined systolic (congestive) and diastolic (congestive) heart failure (CMS/HCC)    Patient seen, evaluated, and discussed with the NILA.  I have personally obtained key components of the history and physical and have performed my own medical decision making.      34 year old female with history of cardiac transplant in March 2022 for heart " failure related to peripartum cardiomyopathy with subsequent rejection and cardiogenic shock requiring mechanical circulatory support with Impella and now VA ECMO. Patient re-listed for heart transplant and kidney transplant in setting of cardiogenic shock and acute renal failure; however, team made decision this morning to move her to status 7 given new leukocytosis, worsening tachypnea, new coagulopathy today.      Neuro: More awake today and less confused, oriented x3 and conversational on rounds but continues to be intermittently somnolent and CAM+. Continue PT/OT, out of bed. Pain control. Encourage sleep and REST protocol. Will continue seroquel, melatonin at night. Dexmedetomidine at night to encourage sleep--better sleep last night. Adjunct therapies if wanted to normalize environment.   CV: Heart transplant, complicated by rejection now with cardiogenic shock requiring ECMO and Impella. Continue full support on ECMO, Impella P3.   Vascular: Vascular Surgery following, appreciate recs.   Pulm: Continued tachypnea requiring nasal cannula today. Monitor closely for increased needs. Encourage IS. Follow CXR.   : Acute kidney injury, currently on CRRT - continue. Appreciate Renal consult. Optimize electrolytes. Goal even to negative half liter if tolerated. Can continue CRRT via ECMO circuit for now and transition to catheter if needed (only venous access).   GI: Diet, bowel regimen. Continue PPI. Poor appetite and intake. Encouraged PO intake today. Will consult ENT tomorrow for potential Dobbhoff under fiberoptic guidance given epistaxis recently; otherwise conversation regarding PEG vs. TPN for nutrition.   Heme: History of DVT with acute thromboses, including clots visualized in bilateral Ijs; US consistent with prior findings. Ongoing coagulopathy today, unknown etiology; liver US negative. No current signs of bleeding, monitor. Holding heparin infusion while coagulopathic. Will plan for vitamin K if INR  >3. Maintain active type and screen.   ID: Improved leukocytosis today. Reculture today and broaden coverage to meropenem with vancomycin, add micafungin; prophylactic Bactrim. Monitor for signs of infection--cultures have been negative.   Endo: Continue levothyroxine. SSI for glucose control.   Immunosuppression: Continue steroids, Immunosuppression, prophylaxis per transplant.      Lines: No PIV access currently. PICC team unable to visualize targets due to cephalic clots and infiltration edema. RIJ trialysis catheter placed 4/6.       Dispo: Continue CTICU care. Discussed with HF attending Dr. Marks on rounds. Patient updated at bedside, will update family as able.      This critically ill patient continues to be at risk for clinically significant deterioration / failure due to the above mentioned dysfunctional, unstable organ systems.  I have personally identified and managed all complex critical care issues to prevent aforementioned clinical deterioration.  Critical care time is spent at bedside and/or the immediate area and has included, but is not limited to, the review of diagnostic tests, labs, radiographs, serial assessments of hemodynamics, respiratory status, ventilatory management, review of consult team recommendations, and family updates.  Time spent in procedures and teaching are reported separately. Critical Care Time: 42 minutes.      I spent 42 minutes in the professional and overall care of this patient.      04/07/24 at 1:25 PM - Haylie Holguin DO

## 2024-04-07 NOTE — PROGRESS NOTES
CTICU Progress Note  Charla Bowles/[id number]    Admit Date: 2/15/2024  Hospital Length of Stay: 52   ICU Length of Stay: 10d 12h   CT SURGEON: Dr. Witt    SUBJECTIVE:   Required Precedex and seroquel overnight for sleep.  On VA ECMO ~ 3.17 L and 5.5 Impella P2 0.8L.   Mentation fluctuating.     MEDICATIONS  Infusions:  dexmedeTOMIDine, Last Rate: Stopped (04/07/24 0630)  [Held by provider] heparin, Last Rate: 500 Units/hr (04/06/24 0800)  lactated Ringer's, Last Rate: 5 mL/hr (04/07/24 0700)  PrismaSol 4/2.5  sodium bicarbonate infusion Impella Purge 25 mEq/1000 mL D5W, Last Rate: 10 mL/hr (04/07/24 0700)      Scheduled:  acetaminophen, 650 mg, q6h  bisacodyl, 10 mg, Once  calcium carbonate-vitamin D3, 1 tablet, Daily  cyanocobalamin, 500 mcg, Daily  darbepoetin floyd, 100 mcg, q14 days  ferrous sulfate (325 mg ferrous sulfate), 65 mg of iron, Daily with breakfast  folic acid, 1 mg, Daily  insulin lispro, 0-5 Units, TID with meals  levothyroxine, 200 mcg, Daily  lidocaine, 1 patch, Daily  melatonin, 10 mg, Daily  multivitamin with minerals, 1 tablet, Daily  mycophenolate, 360 mg, BID  nystatin, 5 mL, q6h  oxygen, , Continuous - Inhalation  pantoprazole, 40 mg, Daily  piperacillin-tazobactam, 3.375 g, q6h  predniSONE, 10 mg, Daily  psyllium, 1 packet, Daily  QUEtiapine, 50 mg, Nightly  rosuvastatin, 10 mg, Nightly  sennosides-docusate sodium, 1 tablet, BID  sod phos di, mono-K phos mono, 500 mg, 4x daily  sodium chloride, 1 spray, 4x daily  sulfamethoxazole-trimethoprim, 80 mg of trimethoprim, Daily  tacrolimus, 3.5 mg, q24h  tacrolimus, 4 mg, q24h  valGANciclovir, 450 mg, q48h  vancomycin, 750 mg, q12h      PRN:  alteplase, 2 mg, PRN  bisacodyl, 10 mg, Daily PRN  calcium gluconate, 1 g, q6h PRN  calcium gluconate, 2 g, q6h PRN  dextrose, 25 g, q15 min PRN  dextrose, 25 g, q15 min PRN   Or  glucagon, 1 mg, q15 min PRN  hydrALAZINE, 5 mg, q4h PRN  HYDROmorphone, 0.2 mg, q3h PRN  hydrOXYzine HCL, 25 mg, q6h  "PRN  ipratropium-albuteroL, 3 mL, q6h PRN  artificial tears, 2 drop, PRN  magnesium sulfate, 2 g, q6h PRN  magnesium sulfate, 4 g, q6h PRN  microfibrllar collagen, , PRN  mupirocin, , BID PRN  naloxone, 0.2 mg, q5 min PRN  ondansetron, 4 mg, q4h PRN  oxyCODONE, 5 mg, q4h PRN  oxygen, , Continuous PRN - O2/gases  sodium chloride, 1 spray, 4x daily PRN  vancomycin, , Daily PRN        PHYSICAL EXAM:   Visit Vitals  BP 92/80 (BP Location: Left arm, Patient Position: Lying) Comment: Post: 89/75   Pulse 101   Temp 36.7 °C (98.1 °F) (Temporal)   Resp (!) 44   Ht 1.549 m (5' 0.98\")   Wt 84 kg (185 lb 3 oz)   SpO2 98%   BMI 35.01 kg/m²   OB Status Hysterectomy   Smoking Status Never   BSA 1.9 m²     Wt Readings from Last 5 Encounters:   04/06/24 84 kg (185 lb 3 oz)   12/07/23 92.1 kg (203 lb)   12/01/23 93 kg (205 lb)   11/29/23 92.9 kg (204 lb 12.8 oz)   11/09/23 91.3 kg (201 lb 3.2 oz)     INTAKE/OUTPUT:  I/O last 3 completed shifts:  In: 1986.6 (23.7 mL/kg) [I.V.:703.3 (8.4 mL/kg); IV Piggyback:1283.3]  Out: 2532 (30.1 mL/kg) [Other:2532]  Weight: 84 kg        LDA:  ECMO Cannulation Peripheral Right;Femoral artery;Femoral vein (Active)   Placement Date/Time: 03/27/24 1700   ECMO Type: Peripheral  Cannulation Site: Right;Femoral artery;Femoral vein  Line Securement: Line secured in 2 locations;Securement device;Sutures   Number of days: 10       Arterial Line 03/27/24 Right Radial (Active)   Placement Date/Time: 03/27/24 1545   Orientation: Right  Location: Radial   Number of days: 10       Ventricular Assist Device Impella 5.5 (Active)   Placement Date/Time: 03/01/24 1956   Placed by: Dr Viramontes  Hand Hygiene Completed: Yes  Site Location: Axilla right  VAD Type: Left ventricular assist device  VAD Brand: Impella 5.5   Number of days: 36       Hemodialysis Cath 04/06/24 Triple lumen Right Non-tunneled catheter Jugular (Active)   Placement Date/Time: 04/06/24 1500   Hand Hygiene Completed: Yes  Site Prep: Usual sterile " procedure followed  Site Prep Agent has Completely Dried Before Insertion: Yes  All 5 Sterile Barriers Used (Gloves, Gown, Cap, Mask, Large Sterile Drape): Yes ...   Number of days: 0     Physical Exam:   - CONSTITUTION: Female patient lying in ICU bed, in no acute distress  - NEUROLOGIC: A+Ox2 but re-orientable, CAM positive, following in all 4 extremities, alert and oriented x3  - CARDIOVASCULAR: ST, R fem VA ECMO, no bleeding at site. R axillary impella, no bleeding at site. +distal signals in bilateral lower extremities  - RESPIRATORY: Coarse, diminished bilateral lung sounds, symmetric chest expansion, tachypnea 5 L NC  - GI: Abdomen soft, non tender, non distended, +bowel sounds  - : Anuric, on CVVH via ECMO circuit  - EXTREMITIES: Warm and dry, no peripheral edema  - SKIN: Left femoral skin breakdown with dressing in place  - PSYCHIATRIC: Calm       Images: Reviewed    Invasive Hemodynamics:    Most Recent Range Past 24hrs   BP (Art) 73/59 Arterial Line BP 1  Min: 62/52  Max: 86/66   MAP(Art) 65 mmHg Arterial Line MAP 1 (mmHg)   Min: 56 mmHg  Max: 73 mmHg   RA/CVP   No data recorded   PA 41/34 No data recorded   PA(mean) 37 mmHg No data recorded   CO 5.5 L/min No data recorded   CI 2.8 L/min/m2 No data recorded   Mixed Venous 87 % SVO2 (%)  Min: 81 %  Max: 95 %   SVR  1115 (dyne*sec)/cm5 No data recorded     ECMO:     Most Recent Range Past 24hrs   Flow 3.17 Patient Flow (L/min)  Min: 2.99  Max: 3.22   Speed 3190 Circuit Flow (RPM)  Min: 3189  Max: 3191   Sweep 1.5 Sweep Gas Flow Rate (L/min)  Min: 1.5  Max: 1.5      Impella:      Most Recent Range Past 24hrs   Performance Level 2 P Level  Min: 2   Min taken time: 04/07/24 0700  Max: 2   Max taken time: 04/07/24 0700   Flow (L/min) 0.8 Flow (L/min)  Min: 0.7   Min taken time: 04/07/24 0400  Max: 1.3   Max taken time: 04/06/24 1500   Motor Current 142/77 Motor Current  Min: 111/77   Min taken time: 04/06/24 0900  Max: 165/80   Max taken time: 04/07/24 0300    Placement Signal Yes  Placement OK could not be evaluated. This SmartLink does not work with rows of the type: Custom List   Purge (mmHg) 401 Purge Pressure (mmHg)  Min: 389   Min taken time: 04/06/24 1000  Max: 605   Max taken time: 04/06/24 1500   Purge rate (mL/hr) 11.8 Purge Rate (mL/hr)  Min: 11.4   Min taken time: 04/07/24 0200  Max: 11.8   Max taken time: 04/07/24 0700        Daily Risk Screen:  Dialysis/Hemapheresis    Assessment/Plan   34F with a PMHx sig for stage D HFrEF (PPCM) s/p ICD s/p CardioMEMs, hypothyroidism 2/2 thyroidectomy, obesity s/p gastric bypass (2017), and SLE who is s/p OHT (3/31/2022) with a post-OHT course complicated by RIJ/RUE DVTs, leukopenia, left groin seroma, and asymptomatic 2R ACR (11/2022) treated with steroids, s/p total hysterectomy (2023), Flu A (1/2024).     Originally presented to [facility] ED on 2/15/24 with complaints of N/V x 3 days and inability to keep medications down. Found to have acute systolic heart failure with primary graft failure and cardiogenic shock. Treated for suspected acute cellular vs. acute antibody mediated rejection; however, multiple cardiac biopsies negative for signs of rejection or other causes of graft failure. Course has been complicated by multiple MCS devices for refractory cardiogenic shock (right femoral IABP: 2/18/24 - 3/1/24, Left femoral VA ECMO: 2/19/24 - 2/29/24, Right axillary impella 5.5: 3/1/24 - remains in place, 2nd right femoral VA ECMO: 3/27/24 - remains in place), ARF requiring RRT, acute liver injury, intubation due to volume overload in setting of pulmonary edema, severe hemolysis 2/2 to impella malposition, mild CMV viremia, bilateral provoked IJ DVTs, multiple episodes of epistaxis & coagulopathies requiring ongoing blood product transfusions.        Transferred to CTICU on 3/27/24 following right femoral VA ECMO placement due to worsening cardiogenic shock and multi-organ failure despite Impella 5.5 support and multiple  inotropes.     Plan:  NEURO: No history. Previously neuro intact with acute pain and ongoing insomnia, requiring Seroquel and precedex overnight for sleep, working well. . Orientation fluctuat, intermittently Cam positive/negative, oriented. Sat on edge of bed and stood at bedside with PT earlier during the week. -->  - Serial neuro and pain assessments  - Continue tylenol scheduled but will stop again if LFT uptrend.  - Continue lidoderm patch for back pain  - PRN oxy 5mg Q46   - d/c dilaudid -> trying to utilize other modalities for discomfort  - Continue melatonin 10 mg HS   - Continue Seroquel 50 mg HS   - Continue Robaxin 500 mg q6h x 48 hrs  - May use precedex at bedtime for sleep  - PRN hydroxyzine  - PT/OT Consult; mobilize as able  - CAM ICU score qshift. Sleep/wake cycle hygiene- rest Protocol     ENT: Multiple episodes of epistaxis with interventions by ENT. Most recently in OR 3/27 with L nare packed. Packing removed 4/1. -->  - appreciate ENT recs  - PRN ocean spray and mupiricin for dry nasal passages  - PRN afrin      Cardiac: Patient has a history of heart transplant in March of 2022 with primary graft dysfunction treated for acute cellular and antibody rejection without improvement with negative biopsy with multiple MCS devices for refractory cardiogenic shock (right femoral IABP: 2/18/24 - 3/1/24, Left femoral VA ECMO: 2/19/24 - 2/29/24, Right axillary impella 5.5: 3/1/24 - remains in place, 2nd right femoral VA ECMO: 3/27/24 - remains in place). Elevated LDH improved on P2. Multiple attempts at repositioning impella previously. ECHO 3/29 with impella potentially deep but not adjusted again. ECMO ~3.17L flow/100%/sweep 1.5, impella 5.5 P2 with flows ~0.8 L appropriate end organ functio.  Gradual uptrending LDH. ST 120s, MAPs 65-70s. -->  - >Was listed as status 1, status 7 now with c/f sepsis and coagulopathy  - Maintain goal MAP 70-90  - Continue full BiV support with ECMO.   - Increase Impella at  P3, for LV Venting  - Continue rosuvastatin dose 10mg daily for impaired renal excretion  - Trend daily LDH   - Hold home amlodipine     Vascular: 3/27 arterial duplex with proximal SFA occlusin with collateral flow. C/f LLE ischemia (previous ECMO site) with loss of pulses- now monophasic with CK that had been normal and no longer trending. Feet warm with intact motor exam. -->  - appreciate vascular surgery following  - Vascular Surgery following for L SFA- No intervention needed at this time     PULM: No history. Intubated multiple times throughout hospital course for procedures; intubated 3/27 for OR. Acute respiratory failure persisting due to acute pulmonary edema had been improving, now increasing pulmonary congestion. 5L NC.  -->  - CXR daily  - Adjust sweep for normal pH  - Maintain SPO2 > 92%     GI: History of gastric bypass and MMF colitis. Shock liver originally resolved; most recently elevated r/t likely congestive hepatopathy that is improving on ECMO. Abdominal pain improved with reduced myfortic dosing. Previous c/f GI bleed, likely blood from epistaxis; no current evidence of GI bleeding. H pylori negative, fecal calprotectin (elevated at 380), fecal lactoferrin (Positive 03/23/24). Last BM 4/3. Concerned for nutritional intake. Last BM 4/7.  >  - Continue calcitriol 0.5mg daily, multivitamin, calcium carbonate 1,250mg BID  - Continue PPI  - Avoiding placing dobhoff with epistaxis.   - Regular diet  - Continue Ensure Clear TID and magic cup  - If intake does not improve with improved mentation today, then tomorrow Consult ENT for fiberoptic Dobhoff placement  - Continue miralax BID & senna-colace BID     : No history, baseline Cr 1.2-1.3. HIRAM originally on CVVH then with renal recovery but then again worsened and now dialysis dependent and failed diuretic challenges. Hypervolemia improved with aggressive volume removal. Persisting hypophosphatemia despite repeated supplementation. Last 24 hours  +1.1 mL on CVVH. -->  - RFP as clinically indicated, replete electrolytes per CTICU protocol.   - Continue CVVH with goal removal -500 ml   - Continue Sodium Phos 500 mg 4x daily  - Nephrology Following     ENDO: History of hypothyroidism and prediabetes. TSH elevated originally to malabsorption then with dosing adjusted by endo; TSH (3/17): 20.74, T4 1.11, T3: 1.4, improved 4/1; TSH 10.13, T4 0.70. Steroid induced hyperglycemia acceptable glycemic control on SSI. -->  - Maintain BG <180 with hypoglycemia protocol  - Continue SSI #1 AC/HS   - Continue Synthroid 200mcg daily.   - Endocrine following, recommending continuing current synthroid dose after levels from 4/1, resending in one week (4/8)  - Follow up tomorrow TSH, T3, T4     HEME: History of iron deficiency anemia and remote DVT; again with acute DVT LIJ and SVT left cephalic vein and right cephalic vein. Acute blood loss anemia with repeated transfusions with significant hemolysis but no evidence of active bleeding; last received RBC 4/5. Stable thrombocytopenia persisting; last PF4 negative 3/30. No epistaxis today. Received 2 U PRBC 4/5. Coagulopathic with INR 2.7 this AM. -->  - Continue ferrous sulfate daily  - Holding ASA for CAD with thrombocytopenia  - Continue bicarb purge  - Hold systemic heparin with elevated INR, Trend daily  - SCDs and for DVT prophylaxis  - Aranesp every 2 weeks  - Last type and screen: 4/5  - Avoid unnecessarily transfusing, HGB > 7.0  - PICC team consulted today for line placement, not able to visualize any vessels in arms to place line.   - If INR > 3.0 -> Administer Vitamin K      Rejection/prophylaxis: S/p heart transplant 3/31/2022. Induction basiliximab. Donor HCV -, toxo -/-, CMV -/+. Mild ACR with +CD4 and negative HLAs however clinical presentation concern for acute cellular vs AMR rejection/stuttering rejection completed courses of thymo/PLEX/IVIG without clinical improvement.  - Steroids: Continue PO prednisone 10  mg daily  - Tacrolimus: 4.0 mg AM/3.5 mg PM w/ daily levels drawn @ 0600  - Tacrolimus goal troughs: 8-10  - Continue Myfortic acid: 360mg BID.  Not starting MMF d/t hx of colitis  - Antifungals: nystatin 500,000units Q6  - Antivirals: valcyte 450mg Q48hrs  - Anti PCP & Toxoplasmosis: restart SS Bactrim with c/f PNA     ID: Afebrile. C/f previous yeast infection. BC 3/27 NGTD final. MRSA screen negative 3/28. Episode of hypotension on 4/1, pan cultured and empiric Vanco/Zosyn started.  Patient was shivering 4/3, concern for risk of infection. Blood cultures sent 4/3, NGTD. Zosyn (4/1- 4/7) then broadened to Susan.  -->  - Trend temp q4h  - D/C Zosyn -> Broaden to Susan and start Britt  - Continue Vanco  - Follow up cxs  - Send Blood cultures and Urine cx (if available)     Skin: Left groin wound from previous ECMO with wound consulted. Wound cx with NG 3/15.   - Preventative Mepilex dressings in place on sacrum and heels  - Change preventative Mepilex weekly or more frequently as indicated (when moist/soiled)   - Every shift skin assessment per nursing and weekly ICU skin rounds  - Moisture barrier to be applied with christopher care  - Active skin problems addressed with nursing on daily rounds  - wound recs from note 3/15 ordered      Proph:  SCDs  Holding systemic heparin  PPI     G: Lines  RIJ Trialysis - 4/6  R radial maxwell 3/27  PIV x2     Restraints: Not indicated.     Code status: Full Code    Restraints: Not indicated    Code status: Full Code      I personally spent 50 minutes of critical care time directly and personally managing the patient exclusive of separately billable procedures.     A,B,C,D,E,F,G: reviewed.  Seen with attending, Dr. Holguin and Dr. Marks      Dispo: CTICU care for now.    CTICU TEAM PHONE [phone]

## 2024-04-07 NOTE — PROGRESS NOTES
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 She has acute renal failure now with >6 weeks of RRT needs and is currently listed for  dual kidney/heart transplantation though in setting of new leukocytosis, coagulopathy, tachypnea has been temporarily deactivated. Leukocytosis and mental status improving. Coagulopathy and respiratory decompensation persists.    - ID consult, continue broad spectrum antibiotics, would boraden to meropenem and micafungin  - monitor tac levels, consider stopping cellcept  - supportive care with rest protocol and family support    This critically ill patient continues to be at-risk for clinically significant deterioration / failure due to the above mentioned dysfunctional, unstable organ systems.  I have personally identified and managed all complex critical care issues to prevent aforementioned clinical deterioration.  Critical care time is spent at bedside and/or the immediate area and has included, but is not limited to, the review of diagnostic tests, labs, radiographs, serial assessments of hemodynamics, respiratory status, ventilatory management, and family updates.  Time spent in procedures and teaching are reported separately.    Critical care time: __40__ minutes     Objective    Charla Bowles/[id number]  Admit Date: 2/15/2024  Hospital Length of Stay: 52   ICU Length of Stay: 10d 19h     MEDICATIONS  Infusions:  dexmedeTOMIDine, Last Rate: Stopped (04/07/24 0630)  [Held by provider] heparin, Last Rate: 500 Units/hr (04/06/24 0800)  lactated Ringer's, Last Rate: 5 mL/hr (04/07/24 0816)  PrismaSol 4/2.5  sodium bicarbonate infusion Impella Purge 25 mEq/1000 mL  D5W, Last Rate: 10 mL/hr (04/07/24 0816)      Scheduled:  acetaminophen, 650 mg, q6h  bisacodyl, 10 mg, Once  calcium carbonate-vitamin D3, 1 tablet, Daily  cyanocobalamin, 500 mcg, Daily  darbepoetin floyd, 100 mcg, q14 days  ferrous sulfate (325 mg ferrous sulfate), 65 mg of iron, Daily with breakfast  folic acid, 1 mg, Daily  insulin lispro, 0-5 Units, TID with meals  levothyroxine, 200 mcg, Daily  lidocaine, 1 patch, Daily  melatonin, 10 mg, Daily  meropenem, 2 g, q12h  methocarbamol, 500 mg, q6h TRICIA  micafungin, 100 mg, q24h  multivitamin with minerals, 1 tablet, Daily  mycophenolate, 360 mg, BID  nystatin, 5 mL, q6h  oxygen, , Continuous - Inhalation  pantoprazole, 40 mg, Daily  predniSONE, 10 mg, Daily  psyllium, 1 packet, Daily  QUEtiapine, 50 mg, Nightly  rosuvastatin, 10 mg, Nightly  sennosides-docusate sodium, 1 tablet, BID  sod phos di, mono-K phos mono, 500 mg, 4x daily  sodium chloride, 1 spray, 4x daily  sulfamethoxazole-trimethoprim, 80 mg of trimethoprim, Daily  tacrolimus, 3.5 mg, q24h  tacrolimus, 4 mg, q24h  thiamine, 500 mg, TID  valGANciclovir, 450 mg, q48h  vancomycin, 1,000 mg, q12h      PRN:  alteplase, 2 mg, PRN  bisacodyl, 10 mg, Daily PRN  calcium gluconate, 1 g, q6h PRN  calcium gluconate, 2 g, q6h PRN  dextrose, 25 g, q15 min PRN  dextrose, 25 g, q15 min PRN   Or  glucagon, 1 mg, q15 min PRN  hydrALAZINE, 5 mg, q4h PRN  hydrOXYzine HCL, 25 mg, q6h PRN  ipratropium-albuteroL, 3 mL, q6h PRN  artificial tears, 2 drop, PRN  magnesium sulfate, 2 g, q6h PRN  magnesium sulfate, 4 g, q6h PRN  microfibrllar collagen, , PRN  mupirocin, , BID PRN  naloxone, 0.2 mg, q5 min PRN  ondansetron, 4 mg, q4h PRN  oxyCODONE, 5 mg, q6h PRN  oxygen, , Continuous PRN - O2/gases  sodium chloride, 1 spray, 4x daily PRN  vancomycin, , Daily PRN        Prior to Admission Meds:  Medications Prior to Admission   Medication Sig Dispense Refill Last Dose    Alcohol Prep Pads pads, medicated        amLODIPine (Norvasc)  2.5 mg tablet TAKE ONE (1) TABLET BY MOUTH ONCE DAILY 30 tablet 11     aspirin 81 mg EC tablet TAKE ONE (1) TABLET BY MOUTH ONCE A DAY 30 tablet 10     blood sugar diagnostic (OneTouch Verio test strips) strip 4 times a day.       calcitriol (Rocaltrol) 0.5 mcg capsule TAKE ONE (1) CAPSULE BY MOUTH ONCE DAILY. 90 capsule 3     calcium carbonate (Oscal) 500 mg calcium (1,250 mg) tablet TAKE ONE (1) TABLET BY MOUTH TWICE DAILY. TAKE AT LEAST 2 HOURS BEFORE OR 2 HOURS AFTER IMMUNOSUPPRESSION MEDICATIONS. 60 tablet 11     docusate sodium (Colace) 100 mg capsule Take 1 capsule (100 mg) by mouth 2 times a day as needed.       ferrous sulfate, 325 mg ferrous sulfate, tablet TAKE ONE (1) TABLET BY MOUTH DAILY. 90 tablet 3     insulin lispro (HumaLOG) 100 unit/mL injection USE FOR SLIDING SCALE INSULIN COVERAGE UP TO 4 TIMES DAILY AS DIRECTED. MAX OF 40 UNITS PER DAY. 15 mL 11     levothyroxine (Synthroid, Levoxyl) 200 mcg tablet TAKE ONE (1) TABLET BY MOUTH ONCE DAILY. (Patient taking differently: Take 2 tablets (400 mcg) by mouth once daily.) 90 tablet 3     [] magnesium oxide (Mag-Ox) 400 mg (241.3 mg magnesium) tablet TAKE TWO (2) TABLETS BY MOUTH DAILY 60 tablet 11     multivitamin tablet Take 1 tablet by mouth once daily.       [] nystatin (Mycostatin) cream Apply topically 2 times a day. 15 g 1     pantoprazole (ProtoNix) 40 mg EC tablet TAKE ONE (1) TABLET BY MOUTH DAILY. 30 tablet 11     predniSONE (Deltasone) 5 mg tablet TAKE ONE (1) TABLET BY MOUTH ONCE DAILY. 90 tablet 3     rosuvastatin (Crestor) 40 mg tablet TAKE ONE (1) TABLET BY MOUTH DAILY. 90 tablet 3     sirolimus (Rapamune) 0.5 mg tablet Take 1 tablet (0.5 mg) by mouth once daily. 90 tablet 3     tacrolimus (Prograf) 0.5 mg capsule Take 1 capsule (0.5 mg) by mouth 2 times a day. 60 capsule 11     tacrolimus (Prograf) 1 mg capsule Take 1 capsule (1 mg) by mouth 2 times a day. 60 capsule 3     [] traMADol (Ultram) 50 mg tablet Take 1  tablet (50 mg) by mouth every 6 hours if needed for severe pain (7 - 10). 100 tablet 0        Invasive Hemodynamics:    Most Recent Range Past 24hrs   BP (Art) 89/77 Arterial Line BP 1  Min: 62/52  Max: 89/77   MAP(Art) 82 mmHg Arterial Line MAP 1 (mmHg)   Min: 56 mmHg  Max: 82 mmHg   RA/CVP   No data recorded   PA 41/34 No data recorded   PA(mean) 37 mmHg No data recorded   PCWP  (Unable to obtain, Provider aware) No data recorded   CO 5.5 L/min No data recorded   CI 2.8 L/min/m2 No data recorded   Mixed Venous 74 % SVO2 (%)  Min: 73 %  Max: 95 %   SVR  1115 (dyne*sec)/cm5 No data recorded   PVR 88 (dyne*sec)/cm5 No data recorded     MCS:   Heart Mate III:     Most Recent Range Past 24hrs   Flow   No data recorded   Speed   No data recorded   Power   No data recorded   PI   No data recorded     ECMO:     Most Recent Range Past 24hrs   Flow 3.06 Patient Flow (L/min)  Min: 2.99  Max: 3.22   Speed 3190 Circuit Flow (RPM)  Min: 3189  Max: 3191   Sweep 1 Sweep Gas Flow Rate (L/min)  Min: 1  Max: 1.5     Impella:      Most Recent Range Past 24hrs   Performance Level 3 P Level  Min: 2   Min taken time: 04/07/24 0800  Max: 3   Max taken time: 04/07/24 1300   Flow (L/min) 1.3 Flow (L/min)  Min: 0.7   Min taken time: 04/07/24 0400  Max: 1.4   Max taken time: 04/07/24 1100   Motor Current 174/116 Motor Current  Min: 112/77   Min taken time: 04/07/24 0500  Max: 180/115   Max taken time: 04/07/24 1000   Placement Signal Yes  Placement OK could not be evaluated. This SmartLink does not work with rows of the type: Custom List   Purge (mmHg) 653 Purge Pressure (mmHg)  Min: 398   Min taken time: 04/06/24 1800  Max: 653   Max taken time: 04/07/24 1300   Purge rate (mL/hr) 11.9 Purge Rate (mL/hr)  Min: 11.4   Min taken time: 04/07/24 1100  Max: 11.9   Max taken time: 04/07/24 1300     VENT:    Most Recent Range Past 24hrs   Mode Pressure support    FiO2 30 % No data recorded   Rate 12 No data recorded   Vt 350 mL  No data recorded  "  PEEP 5 cm H20 No data recorded         4/7/2024    12:00 PM 4/7/2024    11:00 AM 4/7/2024    10:00 AM 4/7/2024     9:00 AM 4/7/2024     8:00 AM 4/7/2024     7:00 AM 4/7/2024     6:00 AM   Vitals   Heart Rate 117 114 112 117 99 101 100   Temp 36.5 °C (97.7 °F)    36.6 °C (97.9 °F)     Resp 29 30 52 26 17 44 47     Visit Vitals  BP 92/80 (BP Location: Left arm, Patient Position: Lying) Comment: Post: 89/75   Pulse (!) 117   Temp 36.5 °C (97.7 °F) (Temporal)   Resp (!) 29   Ht 1.549 m (5' 0.98\")   Wt 84 kg (185 lb 3 oz)   SpO2 100%   BMI 35.01 kg/m²   OB Status Hysterectomy   Smoking Status Never   BSA 1.9 m²     Wt Readings from Last 5 Encounters:   04/06/24 84 kg (185 lb 3 oz)   12/07/23 92.1 kg (203 lb)   12/01/23 93 kg (205 lb)   11/29/23 92.9 kg (204 lb 12.8 oz)   11/09/23 91.3 kg (201 lb 3.2 oz)       Intake/Output Summary (Last 24 hours) at 4/7/2024 1453  Last data filed at 4/7/2024 1300  Gross per 24 hour   Intake 1939 ml   Output 457 ml   Net 1482 ml     CHEST: Unlabored, Clear, Diminished  CV:  Sinus tachycardia  ABD:  Soft Nontender Bowel sounds: All quadrants, Flatus: Yes  EXT:   RLE: Appropriate for ethnicity,Warm  DP: Doppler  PT: Doppler  LLE: Appropriate for ethnicity,Warm  DP: Doppler  PT: Doppler  NEURO:   RASS: Drowsy  CAM: Positive  LOC: Responds to voice  Cognition: Follows commands  GCS: 14    DATA:  CMP:  Recent Labs     04/07/24  1235 04/07/24  0531 04/06/24  1257 04/06/24  0412 04/05/24  0145 04/04/24  0252 04/03/24  1344 04/03/24  0015 04/02/24  1551 04/02/24  0008 04/01/24  1212 04/01/24  0233 03/31/24  1156 03/31/24  0514 03/31/24  0042 03/30/24  1240   * 135* 137 138 140 138 135* 135* 134* 136 137 138 136   < >  --  135*   K 4.1 4.0 3.8 3.6 3.9 4.2 4.3 4.2 4.5 4.3 4.6 4.7 4.4   < >  --  4.2   CL 97* 99 101 101 102 101 101 100 101 101 102 102 101   < >  --  101   CO2 21 23 22 22 23 20* 23 22 22 27 25 27 27   < >  --  23   ANIONGAP 19 17 18 19 19 21* 15 17 16 12 15 14 12   < >  --  15 "   BUN 14 14 12 12 11  11 18 11 10 11 11 13 14 14   < >  --  11   CREATININE 0.73 0.70 0.77 0.71 0.77 1.52* 1.00 0.81 0.79 0.82 0.85 0.80 0.85   < >  --  1.00   EGFR >90 >90 >90 >90 >90 46* 76 >90 >90 >90 >90 >90 >90   < >  --  76   MG  --   --  2.17 2.11 2.25  --   --  2.19  --  2.29 2.29 2.45* 2.49*  --  2.52* 2.39    < > = values in this interval not displayed.     Recent Labs     04/07/24  1235 04/07/24  0531 04/06/24  1257 04/06/24  0911 04/06/24  0412 04/05/24  0145 04/04/24  0252 04/03/24  1344 04/03/24  0015 04/02/24  1551 04/02/24  0008 04/01/24  1212 04/01/24  0233 03/31/24  1156 03/19/24  0605 03/18/24  1244 03/18/24  0315 03/17/24  1533 03/24/23  0842 03/17/23  0838   ALBUMIN 3.7 3.5 3.5 3.6 3.7 3.7 3.9 4.3 4.2  4.2   < > 4.5   < > 4.5 3.6   < >  --    < > 3.4   < > 3.8   ALT  --  15  --  17 18 17  --   --  18  --  19  --  21 29   < >  --    < > 71*   < > 8   AST  --  46*  --  44* 47* 55*  --   --  70*  --  112*  --  182* 319*   < >  --    < > 213*   < > 17   BILITOT  --  1.7*  --  1.7* 1.6* 1.5*  --   --  1.6*  --  2.2*  --  2.4* 4.2*   < >  --    < > 2.6*   < > 0.4   LIPASE  --   --   --   --   --   --   --   --   --   --   --   --   --   --   --  46  --  93*  --  8*    < > = values in this interval not displayed.     CBC:  Recent Labs     04/07/24  1235 04/07/24  0531 04/06/24  1257 04/06/24  0412 04/05/24  1348 04/05/24  0145 04/04/24  0245 04/03/24  1344   WBC 12.9* 11.1 16.2* 12.5* 11.7* 7.3 6.0 6.1   HGB 7.7* 7.7* 9.0* 9.3* 9.6* 6.5* 6.9* 7.4*   HCT 23.4* 23.1* 27.2* 27.8* 28.2* 20.8* 22.3* 23.1*   * 106* 149* 94* 130* 85* 62* 86*   MCV 88 91 88 87 87 87 90 88     COAG:   Recent Labs     04/07/24  0901 04/07/24  0531 04/06/24  0912 04/06/24  0911 04/06/24  0412 04/05/24  0146 04/05/24  0145 04/04/24  0245 04/03/24  0015 04/02/24  1551 04/02/24  0008 04/01/24  1408 04/01/24  0233 03/31/24  1156 03/31/24  0042 03/28/24  1956 03/28/24  1107 03/28/24  0557 03/28/24  0146 03/27/24  1949  03/27/24  0538 03/27/24  0351 05/03/22  0607 04/19/22  1330   INR  --  2.7* 2.5* 2.4* 2.4*  --   --   --   --   --  1.2*  --  1.2* 1.1 1.2*   < > 1.8*  --  2.4* 3.3*   < > 2.7*   < >  --    ARIXTRA  --   --   --   --   --   --   --   --   --   --   --   --   --   --   --   --   --   --   --   --   --   --   --  1.36   HAUF  --   --  <0.1  --   --  0.2  --  0.2  --  0.1 <0.1   < >  --  0.2  --    < >  --    < >  --   --    < >  --    < >  --    HAPTOGLOBIN  --  <10*  --   --  <10*  --  <10* <10* <10*  --  <10*  --  <10*  --  <10*   < >  --    < >  --   --   --  <10*   < >  --    FIBRINOGEN 550*  --   --   --   --   --   --   --   --   --   --   --   --   --   --   --  154*  --  171* 152*  --  166*   < >  --     < > = values in this interval not displayed.     ABO:   Recent Labs     04/06/24  0634   ABO O     HEME/ENDO:   Recent Labs     04/01/24  0233 03/18/24  0315 03/17/24  1533 03/07/24  1402 03/04/24  0338 02/16/24  0116 07/18/23  0924 03/17/23  0838 01/18/23  1557 01/04/23  0825 12/14/22  1541 07/12/22  0722 05/20/22  1521 02/06/22  1614 02/02/22  1703 09/18/20  0715 09/14/20  1234   FERRITIN  --   --   --   --   --  308*  --   --   --   --   --   --  115 --  24  --  32   IRONSAT  --   --   --   --   --  13*  --   --  24*  --   --   --  25  --  7*   < >  --    TSH 10.13* 20.74* 15.88* 14.87*   < > 12.02*   < > 6.13*  --    < > 40.64*   < >  --    < > 0.32*   < >  --    HGBA1C  --   --   --  5.7*  --  6.3*  --  5.7*  --   --  6.4*   < >  --   --   --    < >  --     < > = values in this interval not displayed.     CARDIAC:   Recent Labs     04/07/24  0531 04/06/24  0412 04/05/24  0145 04/04/24  0245 04/03/24  0015 04/02/24  0008 04/01/24  0233 03/31/24  0042 03/08/24  0549 03/07/24  1402 02/19/24  2346 02/16/24  0116 02/15/24  2203 09/06/23  0845 03/29/23  0840 01/18/23  1557 11/10/22  2322 05/02/22  0855 04/25/22  0858   LDH 1,387* 1,172* 878* 925* 1,036* 1,397* 1,908* 3,038*   < >  --    < >  --   --   --   --     < >  --   --   --    TROPHS  --   --   --   --   --   --   --   --   --   --   --  168* 125*  --   --   --  113*  --   --    BNP  --   --   --   --   --   --   --   --   --  4,683*  --  >5,000* >5,000* 66 60  --  53 1,191* 1,797*    < > = values in this interval not displayed.     Recent Labs     04/07/24  0955 04/07/24  0538 04/06/24 2001 04/05/24  1559 04/05/24  1500 03/27/24  1011 03/21/24  1013 03/16/24  0518 03/15/24  2310 03/15/24  1643 03/15/24  1217 03/15/24  0526   LACMX  --   --   --   --   --   --   --  1.1 1.0 1.6 1.5 1.5   LACTATEART 1.0 1.0 1.6 1.0 0.8   < >  --   --   --   --   --   --    SO2MV  --   --   --   --   --   --  48 51 42* 42* 58 47    < > = values in this interval not displayed.     Recent Labs     04/07/24  0531 04/06/24  0412 04/05/24  0559 04/04/24  0603 04/03/24  0015 04/02/24  0558 04/01/24  0611 03/31/24  0558 03/21/24  0609 03/20/24  0607 03/19/24  0605 03/18/24  0315 03/17/24  0550 03/16/24  0611 03/15/24  0609 03/14/24  0623 03/13/24  0558   TACROLIMUS 7.8 6.1 4.0 3.2 4.1 3.9 <2.0 5.0   < > 3.3 3.1 3.6 3.9 4.9 4.9 8.9 5.5   SIROLIMUS  --   --   --   --   --   --   --   --   --  5.7 5.6 6.6 7.9 8.8 9.8 10.8 9.0    < > = values in this interval not displayed.     Recent Labs     02/16/24  0116 07/18/23  0924 03/17/23  0838 11/10/22  2322   CHOL 162 279* 211* 177   LDLF  --  184* 109* 81   LDLCALC 94  --   --   --    HDL 39.2 63.4 62.9 52.2   TRIG 142 158* 198* 220*     MICRO:   Recent Labs     03/27/24  1020 02/15/24  2203 01/18/23  1557   CRP 4.27* 6.06* 0.68     Susceptibility data from last 90 days.  Collected Specimen Info Organism   04/01/24 Fluid from SPUTUM Normal throat araceli       LDA:  ECMO Cannulation Peripheral Right;Femoral artery;Femoral vein (Active)   Placement Date/Time: 03/27/24 1700   ECMO Type: Peripheral  Cannulation Site: Right;Femoral artery;Femoral vein  Line Securement: Line secured in 2 locations;Securement device;Sutures   Number of days: 10        Arterial Line 03/27/24 Right Radial (Active)   Placement Date/Time: 03/27/24 1545   Orientation: Right  Location: Radial   Number of days: 10       Ventricular Assist Device Impella 5.5 (Active)   Placement Date/Time: 03/01/24 1956   Placed by: Dr Viramontes  Hand Hygiene Completed: Yes  Site Location: Axilla right  VAD Type: Left ventricular assist device  VAD Brand: Impella 5.5   Number of days: 36       Hemodialysis Cath 04/06/24 Triple lumen Right Non-tunneled catheter Jugular (Active)   Placement Date/Time: 04/06/24 1500   Hand Hygiene Completed: Yes  Site Prep: Usual sterile procedure followed  Site Prep Agent has Completely Dried Before Insertion: Yes  All 5 Sterile Barriers Used (Gloves, Gown, Cap, Mask, Large Sterile Drape): Yes ...   Number of days: 0     NUTRITION: Adult diet Regular  EMERGENCY CONTACT: Extended Emergency Contact Information  Primary Emergency Contact: SAHIL DIMAS  Address: [address] [place] [address]  Home Phone: [phone]  Mobile Phone: [phone]  Relation: Mother  CODE STATUS: Full Code  DISPO: Discharge Planning  Living Arrangements: Children  Support Systems: Parent  Assistance Needed: Unable to Express (Intubated/Sedated)  Type of Residence: Private residence  Who is requesting discharge planning?: Provider (discharge planning is an ongoing daily process)  Home or Post Acute Services: Other (Comment) (tbd)  Patient expects to be discharged to:: tbd  Does the patient need discharge transport arranged?: Yes  RoundTrip coordination needed?: Yes  Has discharge transport been arranged?: No  FOLLOWUP:   Future Appointments   Date Time Provider Department Center   4/22/2024 10:30 AM Elisabeth Acevedo PA-C [id number]   7/22/2024  2:15 PM Jennifer March MD [id number] [facility]

## 2024-04-07 NOTE — PROGRESS NOTES
Charla Bowles is a 34 y.o. female on day 52 of admission presenting with Cardiogenic shock (CMS/HCC).    Patient requires ECMO support. Drainage cannula site, cannula, pump, oxygenator, return cannula and return cannula site clinically inspected. RPM and sweep reviewed and adjusted appropriate to clinical context. Anticoagulation status and intensity discussed. Plan for weaning, next clinical steps discussed with team and family. Discussed with cardiothoracic surgeon, perfusion, palliative care and physical/occupational therapy    ECMO Indication: heart failure, cardiac transplant rejection  ECMO Cannula Configuration: right femoral arterial-right femoral venous  ECMO circuit run from drainage cannula to pump to oxygenator to return cannula: completed  Presence of cannula bleeding: no evidence of bleeding  Presence of oxygenator clot: no oxygenator clot appreciated  Pre/post PaO2 adequate: adequate  LV Unloading/Pulse Pressure: Impella 5.5 axillary   Distal Perfusion: distal perfusion catheter in place  Anticoagulation Plan/Monitoring: holding heparin infusion in setting of coagulopathy  Mobility Plan/Candidacy: out of bed, PT/OT  Path to decannulation/anticipated decannulation date:currently status 7 on transplant list for heart/kidney    ECMO:     Most Recent Range Past 24hrs   Flow 3.06 Patient Flow (L/min)  Min: 2.99  Max: 3.22   Speed 3190 Circuit Flow (RPM)  Min: 3189  Max: 3191   Sweep 1 Sweep Gas Flow Rate (L/min)  Min: 1  Max: 1.5       dexmedeTOMIDine, 0.1-1.5 mcg/kg/hr, Last Rate: Stopped (04/07/24 0630)  [Held by provider] heparin, 500 Units/hr, Last Rate: 500 Units/hr (04/06/24 0800)  lactated Ringer's, 5 mL/hr, Last Rate: 5 mL/hr (04/07/24 0816)  PrismaSol 4/2.5, 2,400 mL/hr  sodium bicarbonate infusion Impella Purge 25 mEq/1000 mL D5W, 10 mL/hr, Last Rate: 10 mL/hr (04/07/24 0816)      Please see daily progress note for full assessment and plan.     I, as the attending critical care physician,  have personally reviewed the recent patient history, physical exam, vital signs, significant labs, medications, imaging, and ECMO settings/flows of this critically ill patient. Using this information I will continue to actively manage this critically ill patient's extracorporeal membrane oxygenation (ECMO)/extracorporeal life support (ECLS) as documented in the assessment and plan above.     Haylie Holguin, DO

## 2024-04-07 NOTE — PROGRESS NOTES
[facility]  ADVANCED HEART FAILURE AND TRANSPLANT CARDIOLOGY   Charla Bowles/[id number]    Admit Date: 2/15/2024  Hospital Length of Stay: 52   ICU Length of Stay: 10d 14h   Primary Service: CTICU      Charla Bowles is a 34 y.o. female on day 52 of admission presenting with Cardiogenic shock (CMS/HCC).    Subjective   This morning Impella increased from P2 to P3. She remains intermittently encephalopathic, though does endorse an appetite and would be interested in eating a bowl from a local restaurant. She remains intermittently tachypneic this morning. Continues to complain of pain at right axillary impella insertion site.         Objective     Physical Exam  Constitutional:       Appearance: She is ill-appearing.   HENT:      Head: Normocephalic.      Comments: Not actively bleeding      Mouth/Throat:      Mouth: Mucous membranes are moist.      Pharynx: Oropharynx is clear. No oropharyngeal exudate or posterior oropharyngeal erythema.   Eyes:      Extraocular Movements: Extraocular movements intact.      Conjunctiva/sclera: Conjunctivae normal.      Pupils: Pupils are equal, round, and reactive to light.   Neck:      Vascular: No JVD.   Cardiovascular:      Rate and Rhythm: Tachycardia present.      Comments: Right axillary impella in place ~45cm at hub (no hematoma), Right femoral VA ECMO in place with reperfusion catheter (site appears clean and dry). Dopplerable radial and ulnar pulses bilaterally. Dopplerable DP/PT pulses bilateral.   Pulmonary:      Effort: No accessory muscle usage, respiratory distress or retractions.      Breath sounds: Normal breath sounds. No wheezing, rhonchi or rales.      Comments: RA - 2L NC. Tachypnea w/ RR 30-50, although does not appear in respiratory distress.    Abdominal:      General: Abdomen is flat. Bowel sounds are normal. There is no distension.      Palpations: Abdomen is soft. There is no mass.      Hernia: No hernia is  "present.   Musculoskeletal:         General: Swelling (+2 BLE edema) present. No tenderness. Normal range of motion.      Cervical back: Full passive range of motion without pain.   Skin:     General: Skin is dry.      Capillary Refill: Capillary refill takes less than 2 seconds.      Coloration: Skin is not jaundiced.      Findings: Bruising and lesion (Left groin wound appears clean with granulation tissue. No signs of infection.) present. No erythema, laceration, rash or wound.   Neurological:      General: No focal deficit present.      Mental Status: She is alert and oriented to person, place, and time.   Psychiatric:         Behavior: Behavior normal.         Last Recorded Vitals  Blood pressure 92/80, pulse 99, temperature 36.6 °C (97.9 °F), temperature source Temporal, resp. rate 17, height 1.549 m (5' 0.98\"), weight 84 kg (185 lb 3 oz), SpO2 99 %.  Intake/Output last 3 Shifts:  I/O last 3 completed shifts:  In: 1986.6 (23.7 mL/kg) [I.V.:703.3 (8.4 mL/kg); IV Piggyback:1283.3]  Out: 2532 (30.1 mL/kg) [Other:2532]  Weight: 84 kg     Relevant Results  Scheduled medications  acetaminophen, 650 mg, oral, q6h  bisacodyl, 10 mg, rectal, Once  calcium carbonate-vitamin D3, 1 tablet, oral, Daily  cyanocobalamin, 500 mcg, oral, Daily  darbepoetin floyd, 100 mcg, subcutaneous, q14 days  ferrous sulfate (325 mg ferrous sulfate), 65 mg of iron, oral, Daily with breakfast  folic acid, 1 mg, oral, Daily  insulin lispro, 0-5 Units, subcutaneous, TID with meals  levothyroxine, 200 mcg, oral, Daily  lidocaine, 1 patch, transdermal, Daily  melatonin, 10 mg, oral, Daily  meropenem, 2 g, intravenous, q12h  micafungin, 100 mg, intravenous, q24h  multivitamin with minerals, 1 tablet, oral, Daily  mycophenolate, 360 mg, oral, BID  nystatin, 5 mL, Swish & Swallow, q6h  oxygen, , inhalation, Continuous - Inhalation  pantoprazole, 40 mg, intravenous, Daily  predniSONE, 10 mg, oral, Daily  psyllium, 1 packet, oral, Daily  QUEtiapine, " 50 mg, oral, Nightly  rosuvastatin, 10 mg, oral, Nightly  sennosides-docusate sodium, 1 tablet, oral, BID  sod phos di, mono-K phos mono, 500 mg, oral, 4x daily  sodium chloride, 1 spray, Each Nostril, 4x daily  sulfamethoxazole-trimethoprim, 80 mg of trimethoprim, oral, Daily  tacrolimus, 3.5 mg, oral, q24h  tacrolimus, 4 mg, oral, q24h  thiamine, 500 mg, intravenous, TID  valGANciclovir, 450 mg, oral, q48h  vancomycin, 750 mg, intravenous, q12h      Continuous medications  dexmedeTOMIDine, 0.1-1.5 mcg/kg/hr, Last Rate: Stopped (04/07/24 0630)  [Held by provider] heparin, 500 Units/hr, Last Rate: 500 Units/hr (04/06/24 0800)  lactated Ringer's, 5 mL/hr, Last Rate: 5 mL/hr (04/07/24 0816)  PrismaSol 4/2.5, 2,400 mL/hr  sodium bicarbonate infusion Impella Purge 25 mEq/1000 mL D5W, 10 mL/hr, Last Rate: 10 mL/hr (04/07/24 0816)      PRN medications  PRN medications: alteplase, bisacodyl, calcium gluconate, calcium gluconate, dextrose, dextrose **OR** glucagon, hydrALAZINE, HYDROmorphone, hydrOXYzine HCL, ipratropium-albuteroL, artificial tears, magnesium sulfate, magnesium sulfate, microfibrllar collagen, mupirocin, naloxone, ondansetron, oxyCODONE, oxygen, sodium chloride, vancomycin     Results for orders placed or performed during the hospital encounter of 02/15/24 (from the past 24 hour(s))   CBC   Result Value Ref Range    WBC 16.2 (H) 4.4 - 11.3 x10*3/uL    nRBC 3.8 (H) 0.0 - 0.0 /100 WBCs    RBC 3.08 (L) 4.00 - 5.20 x10*6/uL    Hemoglobin 9.0 (L) 12.0 - 16.0 g/dL    Hematocrit 27.2 (L) 36.0 - 46.0 %    MCV 88 80 - 100 fL    MCH 29.2 26.0 - 34.0 pg    MCHC 33.1 32.0 - 36.0 g/dL    RDW 19.8 (H) 11.5 - 14.5 %    Platelets 149 (L) 150 - 450 x10*3/uL   Renal Function Panel   Result Value Ref Range    Glucose 138 (H) 74 - 99 mg/dL    Sodium 137 136 - 145 mmol/L    Potassium 3.8 3.5 - 5.3 mmol/L    Chloride 101 98 - 107 mmol/L    Bicarbonate 22 21 - 32 mmol/L    Anion Gap 18 10 - 20 mmol/L    Urea Nitrogen 12 6 - 23  mg/dL    Creatinine 0.77 0.50 - 1.05 mg/dL    eGFR >90 >60 mL/min/1.73m*2    Calcium 7.5 (L) 8.6 - 10.6 mg/dL    Phosphorus 3.4 2.5 - 4.9 mg/dL    Albumin 3.5 3.4 - 5.0 g/dL   Calcium, ionized   Result Value Ref Range    POCT Calcium, Ionized 1.02 (L) 1.1 - 1.33 mmol/L   Magnesium   Result Value Ref Range    Magnesium 2.17 1.60 - 2.40 mg/dL   POCT GLUCOSE   Result Value Ref Range    POCT Glucose 129 (H) 74 - 99 mg/dL   Transthoracic Echo (TTE) Limited   Result Value Ref Range    LVIDd 4.80 cm    RVSP 33.9 mmHg   Prepare Plasma: 2 Units   Result Value Ref Range    PRODUCT CODE S0349Y09     Unit Number [id number]     Unit ABO O     Unit RH POS     Dispense Status XM     Blood Expiration Date April 11, 2024 00:45 EDT     PRODUCT BLOOD TYPE 5100     UNIT VOLUME 339     PRODUCT CODE D7804L18     Unit Number [id number]     Unit ABO O     Unit RH POS     Dispense Status XM     Blood Expiration Date April 11, 2024 00:45 EDT     PRODUCT BLOOD TYPE 5100     UNIT VOLUME 342    Blood Gas Arterial Full Panel   Result Value Ref Range    POCT pH, Arterial 7.43 (H) 7.38 - 7.42 pH    POCT pCO2, Arterial 29 (L) 38 - 42 mm Hg    POCT pO2, Arterial 121 (H) 85 - 95 mm Hg    POCT SO2, Arterial 100 94 - 100 %    POCT Oxy Hemoglobin, Arterial 95.6 94.0 - 98.0 %    POCT Hematocrit Calculated, Arterial 26.0 (L) 36.0 - 46.0 %    POCT Sodium, Arterial 133 (L) 136 - 145 mmol/L    POCT Potassium, Arterial 3.6 3.5 - 5.3 mmol/L    POCT Chloride, Arterial 103 98 - 107 mmol/L    POCT Ionized Calcium, Arterial 0.96 (L) 1.10 - 1.33 mmol/L    POCT Glucose, Arterial 155 (H) 74 - 99 mg/dL    POCT Lactate, Arterial 1.6 0.4 - 2.0 mmol/L    POCT Base Excess, Arterial -4.4 (L) -2.0 - 3.0 mmol/L    POCT HCO3 Calculated, Arterial 19.2 (L) 22.0 - 26.0 mmol/L    POCT Hemoglobin, Arterial 8.7 (L) 12.0 - 16.0 g/dL    POCT Anion Gap, Arterial 14 10 - 25 mmo/L    Patient Temperature 37.0 degrees Celsius    FiO2 21 %   Reticulocytes   Result Value Ref Range     Retic % 4.0 (H) 0.5 - 2.0 %    Retic Absolute 0.103 (H) 0.018 - 0.083 x10*6/uL    Reticulocyte Hemoglobin 29 28 - 38 pg    Immature Retic fraction 30.7 (H) <=16.0 %   CBC and Auto Differential   Result Value Ref Range    WBC 11.1 4.4 - 11.3 x10*3/uL    nRBC 2.8 (H) 0.0 - 0.0 /100 WBCs    RBC 2.54 (L) 4.00 - 5.20 x10*6/uL    Hemoglobin 7.7 (L) 12.0 - 16.0 g/dL    Hematocrit 23.1 (L) 36.0 - 46.0 %    MCV 91 80 - 100 fL    MCH 30.3 26.0 - 34.0 pg    MCHC 33.3 32.0 - 36.0 g/dL    RDW 20.2 (H) 11.5 - 14.5 %    Platelets 106 (L) 150 - 450 x10*3/uL    Immature Granulocytes %, Automated 1.7 (H) 0.0 - 0.9 %    Immature Granulocytes Absolute, Automated 0.19 0.00 - 0.70 x10*3/uL   Lactate Dehydrogenase   Result Value Ref Range    LDH 1,387 (H) 84 - 246 U/L   Hepatic function panel   Result Value Ref Range    Albumin 3.5 3.4 - 5.0 g/dL    Bilirubin, Total 1.7 (H) 0.0 - 1.2 mg/dL    Bilirubin, Direct 0.9 (H) 0.0 - 0.3 mg/dL    Alkaline Phosphatase 151 (H) 33 - 110 U/L    ALT 15 7 - 45 U/L    AST 46 (H) 9 - 39 U/L    Total Protein 5.3 (L) 6.4 - 8.2 g/dL   ECMO VENOUS FULL PANEL   Result Value Ref Range    POCT pH, Venous ECMO 7.27 Reference range not established pH    POCT pCO2, Venous ECMO 56 Reference range not established mm Hg    POCT pO2,  Venous  ECMO 184 Reference range not established mm Hg    POCT SO2, Venous ECMO 100 Reference range not established %    POCT Oxy Hemoglobin, Venous ECMO 96.2 Reference range not established %    POCT Hematocrit Calculated, Venous ECMO 23.0 (L) 36.0 - 46.0 %    POCT Sodium, Venous ECMO 135 (L) 136 - 145 mmol/L    POCT Potassium, Venous ECMO 3.9 3.5 - 5.3 mmol/L    POCT Chloride, Venous ECMO 101 98 - 107 mmol/L    POCT Ionized Calcium, Venous ECMO 1.10 1.10 - 1.33 mmol/L    POCT Glucose, Venous ECMO 146 (H) 74 - 99 mg/dL    POCT Lactate Venous ECMO 1.1 0.4 - 2.0 mmol/L    POCT Base Excess, Venous ECMO -1.4 Reference range not established mmol/L    POCT HCO3 Calculated, Venous ECMO 25.7  Reference range not established mmol/L    POCT Hemoglobin, Venous ECMO 7.8 (L) 12.0 - 16.0 g/dL    POCT Anion Gap, Venous ECMO 12 Reference range not established mmo/L    Patient Temperature 37.0 degrees Celsius    FiO2 90 %   Coagulation Screen   Result Value Ref Range    Protime 30.5 (H) 9.8 - 12.8 seconds    INR 2.7 (H) 0.9 - 1.1    aPTT 52 (H) 27 - 38 seconds   Basic Metabolic Panel   Result Value Ref Range    Glucose 146 (H) 74 - 99 mg/dL    Sodium 135 (L) 136 - 145 mmol/L    Potassium 4.0 3.5 - 5.3 mmol/L    Chloride 99 98 - 107 mmol/L    Bicarbonate 23 21 - 32 mmol/L    Anion Gap 17 10 - 20 mmol/L    Urea Nitrogen 14 6 - 23 mg/dL    Creatinine 0.70 0.50 - 1.05 mg/dL    eGFR >90 >60 mL/min/1.73m*2    Calcium 7.5 (L) 8.6 - 10.6 mg/dL   Phosphorus   Result Value Ref Range    Phosphorus 3.2 2.5 - 4.9 mg/dL   Manual Differential   Result Value Ref Range    Neutrophils %, Manual 88.8 40.0 - 80.0 %    Bands %, Manual 8.6 0.0 - 5.0 %    Lymphocytes %, Manual 0.0 13.0 - 44.0 %    Monocytes %, Manual 1.7 2.0 - 10.0 %    Eosinophils %, Manual 0.9 0.0 - 6.0 %    Basophils %, Manual 0.0 0.0 - 2.0 %    Seg Neutrophils Absolute, Manual 9.86 (H) 1.20 - 7.00 x10*3/uL    Bands Absolute, Manual 0.95 (H) 0.00 - 0.70 x10*3/uL    Lymphocytes Absolute, Manual 0.00 (L) 1.20 - 4.80 x10*3/uL    Monocytes Absolute, Manual 0.19 0.10 - 1.00 x10*3/uL    Eosinophils Absolute, Manual 0.10 0.00 - 0.70 x10*3/uL    Basophils Absolute, Manual 0.00 0.00 - 0.10 x10*3/uL    Total Cells Counted 116     Neutrophils Absolute, Manual 10.81 (H) 1.20 - 7.70 x10*3/uL    RBC Morphology See Below     Polychromasia Mild     RBC Fragments Few     [place] Cells Few     Acanthocytes Few    ECMO ARTERIAL FULL PANEL   Result Value Ref Range    POCT pH, Arterial ECMO 7.28 Reference range not established pH    POCT pCO2, Arterial ECMO 53 Reference range not established mm Hg    POCT pO2,  Arterial ECMO 297 Reference range not established mm Hg    POCT SO2, Arterial  ECMO 100 Reference range not established %    POCT Oxy Hemoglobin, Arterial ECMO 95.6 Reference range not established %    POCT Hematocrit Calculated, Arterial ECMO 24.0 (L) 36.0 - 46.0 %    POCT Sodium, Arterial  ECMO 133 (L) 136 - 145 mmol/L    POCT Potassium, Arterial  ECMO 3.8 3.5 - 5.3 mmol/L    POCT Chloride, Arterial  ECMO 100 98 - 107 mmol/L    POCT Ionized Calcium, Arterial  ECMO 1.07 (L) 1.10 - 1.33 mmol/L    POCT Glucose, Arterial  ECMO 153 (H) 74 - 99 mg/dL    POCT Lactate Arterial  ECMO 0.8 0.4 - 2.0 mmol/L    POCT Base Excess, Arterial ECMO -1.9 Reference range not established mmol/L    POCT HCO3 Calculated, Arterial ECMO 24.9 Reference range not established mmol/L    POCT Hemoglobin, Arterial  ECMO 7.9 (L) 12.0 - 16.0 g/dL    POCT Anion Gap, Arterial  ECMO 12 Reference range not established mmo/L    Patient Temperature 37.0 degrees Celsius    FiO2 90 %   Blood Gas Arterial Full Panel   Result Value Ref Range    POCT pH, Arterial 7.40 7.38 - 7.42 pH    POCT pCO2, Arterial 37 (L) 38 - 42 mm Hg    POCT pO2, Arterial 220 (H) 85 - 95 mm Hg    POCT SO2, Arterial 100 94 - 100 %    POCT Oxy Hemoglobin, Arterial 95.9 94.0 - 98.0 %    POCT Hematocrit Calculated, Arterial 23.0 (L) 36.0 - 46.0 %    POCT Sodium, Arterial 133 (L) 136 - 145 mmol/L    POCT Potassium, Arterial 3.7 3.5 - 5.3 mmol/L    POCT Chloride, Arterial 101 98 - 107 mmol/L    POCT Ionized Calcium, Arterial 1.02 (L) 1.10 - 1.33 mmol/L    POCT Glucose, Arterial 145 (H) 74 - 99 mg/dL    POCT Lactate, Arterial 1.0 0.4 - 2.0 mmol/L    POCT Base Excess, Arterial -1.7 -2.0 - 3.0 mmol/L    POCT HCO3 Calculated, Arterial 22.9 22.0 - 26.0 mmol/L    POCT Hemoglobin, Arterial 7.8 (L) 12.0 - 16.0 g/dL    POCT Anion Gap, Arterial 13 10 - 25 mmo/L    Patient Temperature 37.0 degrees Celsius    FiO2 21 %   ECMO ARTERIAL FULL PANEL   Result Value Ref Range    POCT pH, Arterial ECMO 7.28 Reference range not established pH    POCT pCO2, Arterial ECMO 52 Reference  range not established mm Hg    POCT pO2,  Arterial ECMO 303 Reference range not established mm Hg    POCT SO2, Arterial ECMO 100 Reference range not established %    POCT Oxy Hemoglobin, Arterial ECMO 95.7 Reference range not established %    POCT Hematocrit Calculated, Arterial ECMO 24.0 (L) 36.0 - 46.0 %    POCT Sodium, Arterial  ECMO 133 (L) 136 - 145 mmol/L    POCT Potassium, Arterial  ECMO 3.8 3.5 - 5.3 mmol/L    POCT Chloride, Arterial  ECMO 100 98 - 107 mmol/L    POCT Ionized Calcium, Arterial  ECMO 1.08 (L) 1.10 - 1.33 mmol/L    POCT Glucose, Arterial  ECMO 152 (H) 74 - 99 mg/dL    POCT Lactate Arterial  ECMO 0.7 0.4 - 2.0 mmol/L    POCT Base Excess, Arterial ECMO -2.4 Reference range not established mmol/L    POCT HCO3 Calculated, Arterial ECMO 24.4 Reference range not established mmol/L    POCT Hemoglobin, Arterial  ECMO 7.9 (L) 12.0 - 16.0 g/dL    POCT Anion Gap, Arterial  ECMO 12 Reference range not established mmo/L    Patient Temperature 37.0 degrees Celsius    FiO2 90 %   ECMO VENOUS FULL PANEL   Result Value Ref Range    POCT pH, Venous ECMO 7.27 Reference range not established pH    POCT pCO2, Venous ECMO 55 Reference range not established mm Hg    POCT pO2,  Venous  ECMO 39 Reference range not established mm Hg    POCT SO2, Venous ECMO 73 Reference range not established %    POCT Oxy Hemoglobin, Venous ECMO 70.3 Reference range not established %    POCT Hematocrit Calculated, Venous ECMO 23.0 (L) 36.0 - 46.0 %    POCT Sodium, Venous ECMO 134 (L) 136 - 145 mmol/L    POCT Potassium, Venous ECMO 3.8 3.5 - 5.3 mmol/L    POCT Chloride, Venous ECMO 101 98 - 107 mmol/L    POCT Ionized Calcium, Venous ECMO 1.09 (L) 1.10 - 1.33 mmol/L    POCT Glucose, Venous ECMO 152 (H) 74 - 99 mg/dL    POCT Lactate Venous ECMO 0.7 0.4 - 2.0 mmol/L    POCT Base Excess, Venous ECMO -1.7 Reference range not established mmol/L    POCT HCO3 Calculated, Venous ECMO 25.3 Reference range not established mmol/L    POCT Hemoglobin,  Venous ECMO 7.6 (L) 12.0 - 16.0 g/dL    POCT Anion Gap, Venous ECMO 12 Reference range not established mmo/L    Patient Temperature 37.0 degrees Celsius    FiO2 90 %   Fibrinogen   Result Value Ref Range    Fibrinogen 550 (H) 200 - 400 mg/dL   POCT GLUCOSE   Result Value Ref Range    POCT Glucose 153 (H) 74 - 99 mg/dL     *Note: Due to a large number of results and/or encounters for the requested time period, some results have not been displayed. A complete set of results can be found in Results Review.       Malnutrition Diagnosis Status: Declining  Malnutrition Diagnosis: Moderate malnutrition related to acute disease or injury  As Evidenced by: pt's reported intake likely meeting <75% of estimated needs for at least 7 days, previous 5% weight loss in 1 month, LOS 42.  I agree with the dietitian's malnutrition diagnosis.      Assessment/Plan   Ms. Yimi Bowles is a 34F with a PMHx sig for stage D HFrEF (PPCM) s/p ICD s/p CardioMEMs, hypothyroidism 2/2 thyroidectomy, obesity s/p gastric bypass (2017), and SLE who is s/p OHT (3/31/2022) with a post-OHT course complicated by RIJ/RUE DVTs, leukopenia, left groin seroma, and asymptomatic 2R ACR (11/2022) treated with steroids, s/p total hysterectomy (2023), Flu A (1/2024).     Originally presented to [facility] ED on 2/15/24 with complaints of N/V x 3 days and inability to keep medications down. Found to have acute systolic heart failure with primary graft failure and cardiogenic shock. Treated for suspected acute cellular vs. acute antibody mediated rejection; however, multiple cardiac biopsies negative for pathology indicating rejection or other causes of graft failure. Course has been complicated by multiple MCS devices for refractory cardiogenic shock (right femoral IABP: 2/18/24 - 3/1/24, Left femoral VA ECMO: 2/19/24 - 2/29/24, Right axillary impella 5.5: 3/1/24 - remains in place, 2nd right femoral VA ECMO: 3/27/24 - remains in place), ARF requiring RRT, acute liver  injury, intubation due to hypoxic respiratory failure, severe hemolysis 2/2 to impella malposition, mild CMV viremia, bilateral provoked IJ DVTs, multiple episodes of epistaxis & coagulopathies requiring ongoing blood product transfusions.       Transferred to CTICU on 3/27/24 following right femoral VA ECMO placement due to worsening cardiogenic shock and multi-organ failure despite Impella 5.5 support and multiple inotropes. Now awaiting suitable organ donation as UNOS status 1 for heart-kidney transplantation (listed 4/3/24).      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  #Acute renal failure requiring RRT   #Acute transaminitis  #Coagulopathy  #Malnutrition  #Thrombocytopenia   #Anemia   #Provoked bilateral IJ DVTs    #Left SFA occlusion   #Suspected pneumonia     Donor/Recipient Infectious history:  CMV: -/+ (low grade CMV viremia w/ levels < 35 on 3/1/24, repeat CMV levels remain negative since 3/27/24)  Toxo: -/-   Hep C: -/-     Rejection/Prophylaxis (transplants):  - Steroids: Continue 10mg PO prednisone daily  - Tacrolimus: Increased to 4.0 mg PO @ 0630 and 3.5mg PO @ 1830 w/ daily levels drawn @ 0600  - Tacrolimus goal troughs: 8-10  - Myfortic acid: 360mg BID    - Antifungals: nystatin 500,000units Q6  - Antivirals: valcyte 450mg Q48hrs   - Anti PCP & Toxoplasmosis: holding Bactrim SS daily due to thrombocytopenia     Last cardiac biopsy: 2/16/24 with ACR grade 1R and no AMR (extremely low C4d+ detected), 2/20/24 negative for ACR and AMR  Last HLA: 4/2/24 negative for class 1 or 2 antibodies   Last RHC: closing numbers on 3/16/24 /86(97) RA 20, PAP 40/33(37), C.O./C.I. 5.5/2.8, PVR 88, SVR 1115   Last LHC: 2/18/24 negative for CAV and CAD   Last TTE/BOB: 3/27/24 shows LVEF 10-15% w/ global hypokinesis, severe RV dysfunction, mild-mod MR, trace TR and impella angled posteriorly   Osteopenia/osteoporosis  prophylaxis: Vitamin D3 currently held. Continue calcium supplements  Peptic/gastric ulcer prophylaxis: Pantoprazole 40mg daily  CAV Prophylaxis: Holding Aspirin 81mg due to continued thrombocytopenia. Continue pravastatin daily.      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 Broad differential including SLE flair, giant cell vasculitis, CAV/CAD, viral cardiomyopathy, sarcoidosis, amyloidosis and allograft rejection amongst many others. 2xallograft biopsies on 2/16 & 2/20 remain negative for any significant pathology. Notably, both biopsies taken after rejection therapies implemented which may have reduced areas of graft damage.    - DSAs remain negative; however, patient may have non-HLA antibodies present. 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   - Negative CAV & CAD via LHC on 2/18/24   - Completed methylpred steroid pulse w/ 1g Q24 x 3 days (2/16-2/18) and prednisone taper   - Thymoglobulin doses: 2/18 & 2/19   - IVIG + PLEX Session: 2/18, 2/20, 3/7, 3/11 and 3/13    - Right femoral VA ECMO flowing 3.5L w/ FIO2 90% and sweep 0.5  - Right axillary impella for LV venting remains in place and set P2 w/ flows of 1.2  - LFTs near normal s/p ECMO cannulation with improving hemolytic labwork s/p impella wean    - Improving sleep; however, intermittently delirious and anxious   - Chest x-ray with mild pulmonary edema   - Out of bed and standing daily   - Mildly hypotensive this morning with continued shacking. Patient at high risk for infection due to immunosuppression, invasive devices and deconditioning. Due to her immunosuppression she may not mount a robust immune response (lack of leukocytosis or fever), along with ECMO circuit masking fever due to cooling.      Transplant Status:  - Was listed Status 1 for combined heart-kidney (listed in UNOS on 4/2/24) --> on 4/6 changed listing to UNOS Status 7 for heart given development of new infection/sepsis, transplant coordinator will update kidney  transplant team as well  - ABO status: O+  - CMV+/EBV+/Toxo-/Hep B-/Hep C-  - Prospective cross match with every donor offer  - Due to potential risk of hyperacute allograft rejection, induction plan will be 500mg solumedrol x 2 (followed by steroid taper) and thymoglobulin     Recommendations:  - Repeat pan-cultures and consult Transplant ID for further evaluation and guidance regarding antibiotic regimen management  - Recommend to broaden from Zosyn to Meropenem  - Agree with continuing Vancomycin and Bactrim   - Reasonable to add Micafungin for fungal coverage  - Agree with encouraging aggressive oral intake today, if remains with poor PO intake, reasonable to consult ENT tomorrow for fiberoptic Dobhoff placement  - Continue tacrolimus 4.0mg @0630 and 3.5mg @1830  - Agree with holding heparin infusion in the setting of elevated INR of 2.7  - Status post 2 units PRBCs on Friday. Goal Hgb > 7.0 and platelets > 25 unless active bleeding.     Continue excellent care per CTICU team.      Patient was examined and discussed with Advanced Heart Failure and Transplant Cardiology attending physician, Dr. Alton Marks.    Signed,  Gwendolyn Del Valle MD  Heart Failure Fellow PGY4

## 2024-04-07 NOTE — PROGRESS NOTES
"Vancomycin Dosing by Pharmacy- FOLLOW UP    Charla Bowles is a 34 y.o. year old female who Pharmacy has been consulted for vancomycin dosing for pneumonia. Based on the patient's indication and renal status this patient is being dosed based on a goal trough/random level of 15-20.     Patient currently on CVVH.    Current vancomycin dose: 750 mg given every 12 hours    Most recent trough level: 13.5 mcg/mL    Visit Vitals  BP 92/80 (BP Location: Left arm, Patient Position: Lying) Comment: Post: 89/75   Pulse 99   Temp 36.6 °C (97.9 °F) (Temporal)   Resp 17        Lab Results   Component Value Date    CREATININE 0.70 04/07/2024    CREATININE 0.77 04/06/2024    CREATININE 0.71 04/06/2024    CREATININE 0.77 04/05/2024        Patient weight is No results found for: \"PTWEIGHT\"    No results found for: \"CULTURE\"     I/O last 3 completed shifts:  In: 1986.6 (23.7 mL/kg) [I.V.:703.3 (8.4 mL/kg); IV Piggyback:1283.3]  Out: 2532 (30.1 mL/kg) [Other:2532]  Weight: 84 kg   [unfilled]    Lab Results   Component Value Date    PATIENTTEMP 37.0 04/07/2024    PATIENTTEMP 37.0 04/07/2024    PATIENTTEMP 37.0 04/07/2024        Assessment/Plan    Below goal random/trough level. Orders placed for new vancomycin regimen of 1000 every 12 hours to begin at 2100 .  The next level will be obtained on 4/8 at 2000. May be obtained sooner if clinically indicated.   Will continue to monitor renal function daily while on vancomycin and order serum creatinine at least every 48 hours if not already ordered.  Follow for continued vancomycin needs, clinical response, and signs/symptoms of toxicity.       Man Blanchard, PharmD, BCPS           "
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
## 2024-04-08 NOTE — CONSULTS
"Nutrition Note:   Nutrition Assessment    Reason for Assessment: Tube feeding recommendations, Provider consult order    Nutrition History:  Food and Nutrient History: Poor oral intakes over the weekend. ENT placed small bore NG tube this morning. Team requesting TF rec's       Anthropometrics:  Height: 154.9 cm (5' 0.98\")   Weight: 84 kg (185 lb 3 oz)   BMI (Calculated): 40.84  IBW/kg (Dietitian Calculated): 47.7 kg  Percent of IBW: 176 %  Adjusted Body Weight (kg): 57 kg    I/O:   Last BM Date: 04/08/24; Stool Appearance: Loose (04/08/24 0600)    Dietary Orders (From admission, onward)       Start     Ordered    04/05/24 1416  Adult diet Regular  Diet effective now        Question:  Diet type  Answer:  Regular    04/05/24 1415    04/05/24 1233  Oral nutritional supplements  Until discontinued        Question Answer Comment   Deliver with Lunch    Deliver with Dinner    Select supplement: Magic Cup        04/05/24 1233    04/03/24 1042  Oral nutritional supplements  Until discontinued        Comments: Berry flavor   Question Answer Comment   Deliver with Breakfast    Deliver with Lunch    Deliver with Dinner    Select supplement: Ensure Clear        04/03/24 1042                     Estimated Needs:   Total Energy Estimated Needs (kCal):  (5807-4830)  Method for Estimating Needs: recalculated MSJ = 1479 kcals/d x1.2-1.3 stress factor ; also =31-33 kcals/kg of adjusted BW or =21-23 kcals/kg of actual BW (84 kgs)  Total Protein Estimated Needs (g):  (75-95 gm/kg)  Method for Estimating Needs: 1.5-2.0 gm/kg+ ideal BW  Total Fluid Estimated Needs (mL):  (per team)           Nutrition Interventions/Recommendations     TF for supplemental EN support = Glucerna 1.5 goal @ 50mls/hr  Begin @ 10mls/hr, increase by 10mls every 8 hours as tolerated   Add FWF per ICU team discretion -- goal TF contains 911mls/d    Goal: Glucerna 1.5 goal @ 50 x24 hours = 1800 kcals, 99gm protein           Nutrition Monitoring and Evaluation "   Food/Nutrient Related History Monitoring  Monitoring and Evaluation Plan: Enteral and parenteral nutrition intake  Enteral and Parenteral Nutrition Intake: Enteral nutrition intake  Criteria: initiate & tolerate TF advancement to goal rate in the coming days                      Time Spent/Follow-up Reminder:   Time Spent (min): 30 minutes  Last Date of Nutrition Visit: 04/08/24  Nutrition Follow-Up Needed?: Dietitian to reassess per policy  Follow up Comment: brief note for TF rec's

## 2024-04-08 NOTE — PROGRESS NOTES
CTICU Progress Note  Charla Bowles/[id number]    Admit Date: 2/15/2024  Hospital Length of Stay: 53   ICU Length of Stay: 11d 13h   CT SURGEON: Dr. Witt    SUBJECTIVE:   No acute events overnight. Remains on VA ECMO with impella. Precedex for sleep. Remains intermittently delirious.     MEDICATIONS  Infusions:  dexmedeTOMIDine, Last Rate: 0.2 mcg/kg/hr (04/08/24 0700)  [Held by provider] heparin, Last Rate: 500 Units/hr (04/06/24 0800)  lactated Ringer's, Last Rate: 5 mL/hr (04/08/24 0700)  PrismaSol 4/2.5  sodium bicarbonate infusion Impella Purge 25 mEq/1000 mL D5W, Last Rate: 10 mL/hr (04/08/24 0700)      Scheduled:  acetaminophen, 650 mg, q6h  bisacodyl, 10 mg, Once  calcium carbonate-vitamin D3, 1 tablet, Daily  cyanocobalamin, 500 mcg, Daily  darbepoetin floyd, 100 mcg, q14 days  ferrous sulfate (325 mg ferrous sulfate), 65 mg of iron, Daily with breakfast  folic acid, 1 mg, Daily  insulin lispro, 0-5 Units, TID with meals  levothyroxine, 200 mcg, Daily  lidocaine, 1 patch, Daily  melatonin, 10 mg, Daily  meropenem, 2 g, q12h  methocarbamol, 500 mg, q6h TRICIA  micafungin, 100 mg, q24h  multivitamin with minerals, 1 tablet, Daily  mycophenolate, 360 mg, BID  nystatin, 5 mL, q6h  oxygen, , Continuous - Inhalation  pantoprazole, 40 mg, Daily  predniSONE, 10 mg, Daily  psyllium, 1 packet, Daily  QUEtiapine, 50 mg, Nightly  rosuvastatin, 10 mg, Nightly  sennosides-docusate sodium, 1 tablet, BID  sod phos di, mono-K phos mono, 500 mg, 4x daily  sodium chloride, 1 spray, 4x daily  sulfamethoxazole-trimethoprim, 80 mg of trimethoprim, Daily  tacrolimus, 3.5 mg, q24h  tacrolimus, 4 mg, q24h  thiamine, 500 mg, TID  valGANciclovir, 450 mg, q48h  vancomycin, 1,000 mg, q12h      PRN:  alteplase, 2 mg, PRN  bisacodyl, 10 mg, Daily PRN  calcium gluconate, 1 g, q6h PRN  calcium gluconate, 2 g, q6h PRN  dextrose, 25 g, q15 min PRN  dextrose, 25 g, q15 min PRN   Or  glucagon, 1 mg, q15 min PRN  hydrALAZINE, 5 mg, q4h  "PRN  hydrOXYzine HCL, 25 mg, q6h PRN  ipratropium-albuteroL, 3 mL, q6h PRN  artificial tears, 2 drop, PRN  magnesium sulfate, 2 g, q6h PRN  magnesium sulfate, 4 g, q6h PRN  microfibrllar collagen, , PRN  mupirocin, , BID PRN  naloxone, 0.2 mg, q5 min PRN  ondansetron, 4 mg, q4h PRN  oxyCODONE, 5 mg, q6h PRN  oxygen, , Continuous PRN - O2/gases  sodium chloride, 1 spray, 4x daily PRN  vancomycin, , Daily PRN        PHYSICAL EXAM:   Visit Vitals  BP 92/80 (BP Location: Left arm, Patient Position: Lying) Comment: Post: 89/75   Pulse (!) 112   Temp 37 °C (98.6 °F)   Resp (!) 32   Ht 1.549 m (5' 0.98\")   Wt 84 kg (185 lb 3 oz)   SpO2 100%   BMI 35.01 kg/m²   OB Status Hysterectomy   Smoking Status Never   BSA 1.9 m²     Wt Readings from Last 5 Encounters:   04/06/24 84 kg (185 lb 3 oz)   12/07/23 92.1 kg (203 lb)   12/01/23 93 kg (205 lb)   11/29/23 92.9 kg (204 lb 12.8 oz)   11/09/23 91.3 kg (201 lb 3.2 oz)     INTAKE/OUTPUT:  I/O last 3 completed shifts:  In: 2756.4 (32.8 mL/kg) [P.O.:400; I.V.:773.1 (9.2 mL/kg); IV Piggyback:1583.3]  Out: 1585 (18.9 mL/kg) [Urine:100 (0 mL/kg/hr); Other:1485]  Weight: 84 kg        LDA:  ECMO Cannulation Peripheral Right;Femoral artery;Femoral vein (Active)   Placement Date/Time: 03/27/24 1700   ECMO Type: Peripheral  Cannulation Site: Right;Femoral artery;Femoral vein  Line Securement: Line secured in 2 locations;Securement device;Sutures   Number of days: 10       Arterial Line 03/27/24 Right Radial (Active)   Placement Date/Time: 03/27/24 1545   Orientation: Right  Location: Radial   Number of days: 10       Ventricular Assist Device Impella 5.5 (Active)   Placement Date/Time: 03/01/24 1956   Placed by: Dr Viramontes  Hand Hygiene Completed: Yes  Site Location: Axilla right  VAD Type: Left ventricular assist device  VAD Brand: Impella 5.5   Number of days: 36       Hemodialysis Cath 04/06/24 Triple lumen Right Non-tunneled catheter Jugular (Active)   Placement Date/Time: 04/06/24 1500  "  Hand Hygiene Completed: Yes  Site Prep: Usual sterile procedure followed  Site Prep Agent has Completely Dried Before Insertion: Yes  All 5 Sterile Barriers Used (Gloves, Gown, Cap, Mask, Large Sterile Drape): Yes ...   Number of days: 0     Physical Exam:   - CONSTITUTION: Female patient lying in ICU bed, in no acute distress  - NEUROLOGIC: A+Ox2 but re-orientable, CAM positive, following in all 4 extremities  - CARDIOVASCULAR: ST, R fem VA ECMO, no bleeding at site. R axillary impella, no bleeding at site. No s/s infection at either site. +distal signals in bilateral lower extremities  - RESPIRATORY: Coarse, diminished bilateral lung sounds, symmetric chest expansion, tachypnea RA  - GI: Abdomen soft, non tender, non distended, +bowel sounds  - : Anuric, on CVVH via ECMO circuit  - EXTREMITIES: Warm and dry, no peripheral edema  - SKIN: Left femoral skin breakdown with dressing in place  - PSYCHIATRIC: Calm     Images: Reviewed    Invasive Hemodynamics:    Most Recent Range Past 24hrs   BP (Art) 88/76 Arterial Line BP 1  Min: 80/71  Max: 101/87   MAP(Art) 82 mmHg Arterial Line MAP 1 (mmHg)   Min: 72 mmHg  Max: 186 mmHg   RA/CVP   No data recorded   PA 41/34 No data recorded   PA(mean) 37 mmHg No data recorded   CO 5.5 L/min No data recorded   CI 2.8 L/min/m2 No data recorded   Mixed Venous 61 % SVO2 (%)  Min: 54 %  Max: 100 %   SVR  1115 (dyne*sec)/cm5 No data recorded     ECMO:     Most Recent Range Past 24hrs   Flow 3.07 Patient Flow (L/min)  Min: 3.04  Max: 3.15   Speed 3190 Circuit Flow (RPM)  Min: 3190  Max: 3191   Sweep 1 Sweep Gas Flow Rate (L/min)  Min: 1  Max: 1      Impella:      Most Recent Range Past 24hrs   Performance Level 3 P Level  Min: 3   Min taken time: 04/08/24 0700  Max: 3   Max taken time: 04/08/24 0700   Flow (L/min) 1 Flow (L/min)  Min: 0.7   Min taken time: 04/08/24 0300  Max: 1.4   Max taken time: 04/07/24 1100   Motor Current 163/117 Motor Current  Min: 159/118   Min taken time:  04/08/24 0300  Max: 180/115   Max taken time: 04/07/24 1000   Placement Signal Yes  Placement OK could not be evaluated. This SmartLink does not work with rows of the type: Custom List   Purge (mmHg) 433 Purge Pressure (mmHg)  Min: 397   Min taken time: 04/07/24 2000  Max: 653   Max taken time: 04/07/24 1300   Purge rate (mL/hr) 11.3 Purge Rate (mL/hr)  Min: 11.3   Min taken time: 04/08/24 0700  Max: 11.9   Max taken time: 04/07/24 1500        Daily Risk Screen:  Dialysis/Hemapheresis    Assessment/Plan   34F with a PMHx sig for stage D HFrEF (PPCM) s/p ICD s/p CardioMEMs, hypothyroidism 2/2 thyroidectomy, obesity s/p gastric bypass (2017), and SLE who is s/p OHT (3/31/2022) with a post-OHT course complicated by RIJ/RUE DVTs, leukopenia, left groin seroma, and asymptomatic 2R ACR (11/2022) treated with steroids, s/p total hysterectomy (2023), Flu A (1/2024).     Originally presented to [facility] ED on 2/15/24 with complaints of N/V x 3 days and inability to keep medications down. Found to have acute systolic heart failure with primary graft failure and cardiogenic shock. Treated for suspected acute cellular vs. acute antibody mediated rejection; however, multiple cardiac biopsies negative for signs of rejection or other causes of graft failure. Course has been complicated by multiple MCS devices for refractory cardiogenic shock (right femoral IABP: 2/18/24 - 3/1/24, Left femoral VA ECMO: 2/19/24 - 2/29/24, Right axillary impella 5.5: 3/1/24 - remains in place, 2nd right femoral VA ECMO: 3/27/24 - remains in place), ARF requiring RRT, acute liver injury, intubation due to volume overload in setting of pulmonary edema, severe hemolysis 2/2 to impella malposition, mild CMV viremia, bilateral provoked IJ DVTs, multiple episodes of epistaxis & coagulopathies requiring ongoing blood product transfusions.        Transferred to CTICU on 3/27/24 following right femoral VA ECMO placement due to worsening cardiogenic shock and  multi-organ failure despite Impella 5.5 support and multiple inotropes. Listed Status 1 for heart/kidney on 4/2, now deactivated to Status 7 with coagulopathy and c/f sepsis.      Plan:  NEURO: No history. Previously neuro intact with acute pain and ongoing insomnia, requiring Seroquel and precedex overnight for sleep, working well. . Orientation fluctuat, intermittently Cam positive/negative, oriented. Sat on edge of bed with PT today. Last week, was standing at side of bed. -->  - Serial neuro and pain assessments  - Continue tylenol scheduled  - Continue lidoderm patch for back pain  - PRN oxy 5mg Q46   - Continue melatonin 10 mg HS   - Continue Seroquel 50 mg HS     - Continue Robaxin 500 mg q6h x 48 hrs  - May use precedex at bedtime for sleep  - PRN hydroxyzine  - PT/OT Consult; mobilize as able  - CAM ICU score qshift. Sleep/wake cycle hygiene- rest Protocol     ENT: Multiple episodes of epistaxis with interventions by ENT. Most recently in OR 3/27 with L nare packed. Packing removed 4/1. -->  - appreciate ENT recs  - PRN ocean spray and mupiricin for dry nasal passages  - PRN afrin      Cardiac: Patient has a history of heart transplant in March of 2022 with primary graft dysfunction treated for acute cellular and antibody rejection without improvement with negative biopsy with multiple MCS devices for refractory cardiogenic shock (right femoral IABP: 2/18/24 - 3/1/24, Left femoral VA ECMO: 2/19/24 - 2/29/24, Right axillary impella 5.5: 3/1/24 - remains in place, 2nd right femoral VA ECMO: 3/27/24 - remains in place). Elevated LDH improved on P2. Multiple attempts at repositioning impella previously. ECHO 3/29 with impella potentially deep but not adjusted again. ECMO ~3.17L flow/100%/sweep 1.5, impella 5.5 P3 with flows ~0.8 L appropriate end organ function.  LDH stable. ST 120s, MAPs 65-70s. -->  - >Was listed as status 1, status 7 now with c/f sepsis and coagulopathy  - Maintain goal MAP 70-90  - Continue  full BiV support with ECMO  - Continue Impella at P3, for LV Venting, consider removal with inadequate positioning, low flows, c/f infection of unknown origin  - Continue rosuvastatin dose 10mg daily for impaired renal excretion  - Trend daily LDH   - Hold home amlodipine     Vascular: 3/27 arterial duplex with proximal SFA occlusion with collateral flow. C/f LLE ischemia (previous ECMO site) with loss of pulses- now monophasic with CK that had been normal and no longer trending. Feet warm with intact motor exam. -->  - appreciate vascular surgery following  - Vascular Surgery following for L SFA- No intervention needed at this time     PULM: No history. Intubated multiple times throughout hospital course for procedures; intubated 3/27 for OR. Acute respiratory failure persisting due to acute pulmonary edema had been improving, now increasing pulmonary congestion. RA, occasionally on NC for comfort.  -->  - CXR daily  - Adjust sweep for normal pH  - Maintain SPO2 > 92%     GI: History of gastric bypass and MMF colitis. Shock liver originally resolved; most recently elevated r/t likely congestive hepatopathy that is improving on ECMO. Abdominal pain improved with reduced myfortic dosing. Previous c/f GI bleed, likely blood from epistaxis; no current evidence of GI bleeding. H pylori negative, fecal calprotectin (elevated at 380), fecal lactoferrin (Positive 03/23/24). Last BM 4/9. Poor nutritional intake. Dobhoff placed by ENT today under fiberoptic visualization.  >  - Continue calcitriol 0.5mg daily, multivitamin, calcium carbonate 1,250mg BID  - Continue PPI daily  - Regular diet with TF, advance to goal  - Continue Ensure Clear TID and magic cup  - Continue miralax BID & senna-colace BID     : No history, baseline Cr 1.2-1.3. HIRAM originally on CVVH then with renal recovery but then again worsened and now dialysis dependent and failed diuretic challenges. Hypervolemia improved. Persisting hypophosphatemia  despite repeated supplementation. -->  - RFP as clinically indicated, replete electrolytes per CTICU protocol.   - Continue CVVH with goal removal -500 ml   - Continue Sodium Phos 500 mg 4x daily  - Nephrology Following     ENDO: History of hypothyroidism and prediabetes. TSH elevated originally to malabsorption then with dosing adjusted by endo; TSH (3/17): 20.74, T4 1.11, T3: 1.4, improved 4/1; TSH 10.13, T4 0.70. 4/8: TSH 19.48, T3 0.8, T4 0.68. Steroid induced hyperglycemia acceptable glycemic control on SSI. -->  - Maintain BG <180 with hypoglycemia protocol  - Continue SSI #1 AC/HS   - Continue Synthroid 200mcg daily.   - Endocrine following, will discuss thyroid function panel from today     HEME: History of iron deficiency anemia and remote DVT; again with acute DVT LIJ and SVT left cephalic vein and right cephalic vein. Acute blood loss anemia with repeated transfusions with significant hemolysis but no evidence of active bleeding; last received RBC 4/5. Stable thrombocytopenia persisting; last PF4 negative 3/30. No epistaxis today. Received 2 U PRBC 4/5. Coagulopathic with increasing INR but stable LFTs, 2.9 today. Possibly 2/2 malnutrition. Liver ultrasound 4/6 unconcerning. -->  - Continue ferrous sulfate daily  - Avoid unnecessarily transfusing, HGB > 7.0  - Vitamin K x 3 doses  - Holding ASA for CAD with thrombocytopenia  - Hold systemic heparin with elevated INR, Trend coags daily  - SCDs for DVT prophylaxis  - Aranesp every 2 weeks  - Last type and screen: 4/6     Rejection/prophylaxis: S/p heart transplant 3/31/2022. Induction basiliximab. Donor HCV -, toxo -/-, CMV -/+. Mild ACR with +CD4 and negative HLAs however clinical presentation concern for acute cellular vs AMR rejection/stuttering rejection completed courses of thymo/PLEX/IVIG without clinical improvement.  - Steroids: Continue PO prednisone 10 mg daily  - Tacrolimus: 4.0 mg AM/3.5 mg PM w/ daily levels drawn @ 0600  - Tacrolimus goal  troughs: 8-10  - Continue Myfortic acid: 360mg BID.  Not starting MMF d/t hx of colitis  - Antifungals: nystatin 500,000units Q6  - Antivirals: valcyte 450mg Q48hrs  - Anti PCP & Toxoplasmosis: continue SS Bactrim with c/f PNA     ID: Afebrile. C/f previous yeast infection. BC 3/27 NGTD final. MRSA screen negative 3/28. Episode of hypotension on 4/1, pan cultured and empiric Vanco/Zosyn started.  Patient was shivering 4/3, concern for risk of infection. Blood cultures sent 4/3, NGTD. Zosyn (4/1- 4/7) then broadened to Susan (4-7 - ) and natalie started (4/7 - ).  -->  - Trend temp q4h  - Continue Susan and Natalie (4/7 - )  - Continue Vanco (4/6 - )  - Follow up cxs  - Send sputum culture if available  - ID consulted, requested labs sent (CMV PCR, RVP, COVID/flu (negative), serum beta b glucan, toxo PCR, crypto antigen, urine histo antigen)  - CT chest/abd/pelvis today     Skin: Left groin wound from previous ECMO with wound consulted. Wound cx with NG 3/15.   - Preventative Mepilex dressings in place on sacrum and heels  - Change preventative Mepilex weekly or more frequently as indicated (when moist/soiled)   - Every shift skin assessment per nursing and weekly ICU skin rounds  - Moisture barrier to be applied with christopher care  - Active skin problems addressed with nursing on daily rounds  - wound recs from note 3/15 ordered      Proph:  SCDs  Holding systemic heparin  PPI     G: Lines  RIJ Trialysis - 4/6  R radial maxwell 3/27  PIV x2    Restraints: Not indicated    Code status: Full Code    I personally spent 50 minutes of critical care time directly and personally managing the patient exclusive of separately billable procedures.     A,B,C,D,E,F,G: reviewed.  Seen with attending, Dr. Gilliam and HF attending Dr. Toussaint.     Dispo: CTICU care for now.    CTICU TEAM PHONE [phone]

## 2024-04-08 NOTE — PROGRESS NOTES
Occupational Therapy    Occupational Therapy Treatment    Name: Charla Bowles  MRN: [id number]  : 1990  Date: 24  Time Calculation  Start Time: 1305  Stop Time: 1333  Time Calculation (min): 28 min    Assessment:  End of Session Communication: Bedside nurse, Physician, PCT/NA/CTA  End of Session Patient Position: Bed, 3 rail up, Alarm off, not on at start of session  Plan:  Treatment Interventions: ADL retraining, Functional transfer training, Endurance training, UE strengthening/ROM, Cognitive reorientation, Patient/family training, Equipment evaluation/education, Neuromuscular reeducation, Fine motor coordination activities, Compensatory technique education  OT Frequency: 5 times per week  OT Discharge Recommendations: High intensity level of continued care  Equipment Recommended upon Discharge:  (TBD)  OT Recommended Transfer Status: Assist of 2  OT - OK to Discharge: Yes    Subjective   General:  OT Last Visit  OT Received On: 24  Co-Treatment: PT  Co-Treatment Reason: Pt on ECMO requiring skilled 2 person assist for safe mobility  Prior to Session Communication: Bedside nurse, PCT/NA/CTA  Patient Position Received: Bed, 3 rail up, Alarm off, not on at start of session  Family/Caregiver Present: Yes  Caregiver Feedback: sons and sister at bedside, supportive  General Comment: Pt on VA ECMO flow 2.96, 90% FiO2, sweep 1000. R axillary impella (P-3), flow 1.1. ECMO and impella examined pre, during and post mobility. CVVH through ECMO. Stable and secure. Pt very self directed and impulsive with mobility this date.     Precautions:  Hearing/Visual Limitations: WFL  Medical Precautions: Cardiac precautions, Fall precautions  Precautions Comment: R axillary impella precautions- no pushing/pulling with R UE, No lifting R UE above shoulder height, no R UE humeral EXT past plane of body, R femoral ECMO precautions, MAP 70-90, protective precautions    Vitals:  Vital Signs  Heart Rate: (!) 115  (post: 115)  Resp: 17 (post: 17)  SpO2: 93 % (post: 94)  BP: 92/78 (post: 86/70)  MAP (mmHg): 77  Lines/Tubes/Drains:  ECMO Cannulation Peripheral Right;Femoral artery;Femoral vein (Active)   Number of days: 11       Arterial Line 03/27/24 Right Radial (Active)   Number of days: 12       Ventricular Assist Device Impella 5.5 (Active)   Number of days: 37       NG/OG/Feeding Tube Nasojejunal Left nostril (Active)   Number of days: 0       Hemodialysis Cath 04/06/24 Triple lumen Right Non-tunneled catheter Jugular (Active)   Number of days: 2       Cognition:  Overall Cognitive Status: Impaired  Arousal/Alertness: Delayed responses to stimuli  Orientation Level:  (oriented x3)  Cognition Comments: Highly self directed and impulsive. Max cues requires for safety awareness and adherence to precautions  Insight: Moderate  Impulsive: Moderately    Pain Assessment:  Pain Assessment  Pain Assessment:  (pt reported back pain, unquantified)     Objective      Bed Mobility/Transfers:   Bed Mobility  Bed Mobility: Yes  Bed Mobility 1  Bed Mobility 1: Supine to sitting, Sitting to supine  Level of Assistance 1: Dependent (x2)  Bed Mobility Comments 1: HOB elevated, max cues for safety  Bed Mobility 2  Bed Mobility  2: Rolling right  Level of Assistance 2: Dependent    Transfers  Transfer: No        Therapy/Activity:      Therapeutic Activity  Therapeutic Activity Performed: Yes  Therapeutic Activity 1: Pt sat EOB ~12 minutes. Primarily Dep A with impulsive left lateral lean. Tolerated brief bout of mod A with hand held (L) assist and max cues for safety.  Therapeutic Activity 2: Pt engaged in embrace with sons at EOB with min assist to elevate LUE for task and dep A for sitting balance.     Outcome Measures:  [facility] Daily Activity  Putting on and taking off regular lower body clothing: Total  Bathing (including washing, rinsing, drying): A lot  Putting on and taking off regular upper body clothing: A lot  Toileting, which  includes using toilet, bedpan or urinal: Total  Taking care of personal grooming such as brushing teeth: A little  Eating Meals: A little  Daily Activity - Total Score: 12     Education Documentation  Body Mechanics, taught by Marcia Thomason, OT at 4/8/2024  4:13 PM.  Learner: Patient  Readiness: Nonacceptance  Method: Explanation, Demonstration  Response: Needs Reinforcement    Precautions, taught by Marcia Thomason, OT at 4/8/2024  4:13 PM.  Learner: Patient  Readiness: Nonacceptance  Method: Explanation, Demonstration  Response: Needs Reinforcement    Education Comments  No comments found.        Goals:  Encounter Problems       Encounter Problems (Active)       ADLs       Patient will complete daily grooming tasks in standing with LRAD with supervision level of assistance and PRN adaptive equipment. (Progressing)       Start:  03/01/24    Expected End:  04/16/24               ADLs       Patient with complete lower body dressing with stand by assist level of assistance donning and doffing all LE clothes  with PRN adaptive equipment (Not met)       Start:  03/04/24    Expected End:  03/18/24    Resolved:  04/02/24    Updated to: Patient with complete UB bathing with stand by assist level of assistance  with PRN adaptive equipment    Update reason: re eval          Patient will complete toileting including hygiene clothing management/hygiene with stand by assist level of assistance. (Not met)       Start:  03/04/24    Expected End:  03/18/24    Resolved:  04/02/24    Updated to: Patient will complete upper body dressing including with mod I level of assistance.    Update reason: re eval         Patient with complete UB bathing with stand by assist level of assistance  with PRN adaptive equipment (Progressing)       Start:  04/02/24    Expected End:  04/16/24                Patient will complete upper body dressing including with mod I level of assistance. (Progressing)       Start:  04/02/24     Expected End:  04/16/24                   BALANCE       Pt will increase dynamic standing tolerance to >10 min with supervision using LRAD during functional mobility/ADLs without LOB in order to improve activity tolerance and balance for self-care tasks.  (Progressing)       Start:  03/01/24    Expected End:  04/16/24               COGNITION/SAFETY       Patient will participate in cognitive activities to demonstrate WFL score on further cognitive assessments, including Medi-Cog, MoCA and remain A&O x3, CAM (-).  (Progressing)       Start:  03/01/24    Expected End:  04/16/24               EXERCISE/STRENGTHENING       Patient will be educated on BUE HEP for increased ADL/IADL performance. (Progressing)       Start:  03/01/24    Expected End:  04/16/24               MOBILITY       Patient will perform Functional mobility max Household distances/Community Distances with supervision level of assistance and least restrictive device in order to improve safety and functional mobility. (Progressing)       Start:  03/01/24    Expected End:  04/16/24 04/08/24 at 4:13 PM   Marcia Thomason, OT   [phone]

## 2024-04-08 NOTE — PROGRESS NOTES
Charla Bowles is a 34 y.o. female on day 53 of admission presenting with Cardiogenic shock (CMS/HCC).    Subjective   Interval History:  -Ongoing encephalopathy today, worsened from prior  -Endorses feeling of diffuse pressure  -Back pain noted, on discussion with bedside care team no concerns for wound  -Endorses ongoing abdominal pain, predominantly epigastric though less notable today    Objective   Range of Vitals (last 24 hours)  Heart Rate:  []   Temp:  [36.7 °C (98.1 °F)-37.1 °C (98.8 °F)]   Resp:  [17-53]   BP: (91-92)/(77-78)   SpO2:  [92 %-100 %]   Daily Weight  04/06/24 : 84 kg (185 lb 3 oz)    Body mass index is 35.01 kg/m².    Physical Exam  GEN: Lethargic, acutely ill-appearing.  HEENT: EOMI, anicteric sclera. No oral lesions appreciated, though limited exam  CV: Mechanical pump sounds, tachycardic.   RESP: on NC, tachypneic  ABD: Soft, milder TTP in epigastrium  EXT: Edema present bilaterally, WWP. Groin line site appear c/d/I; no evidence of acute infections  SKIN: No skin rash    Relevant Results  Labs  Results from last 72 hours   Lab Units 04/08/24  1412 04/08/24  0544 04/07/24  1235 04/07/24  0531 04/06/24  1257 04/06/24  0412   WBC AUTO x10*3/uL 17.5* 13.6* 12.9* 11.1   < > 12.5*   HEMOGLOBIN g/dL 7.1* 6.9* 7.7* 7.7*   < > 9.3*   HEMATOCRIT % 21.6* 20.9* 23.4* 23.1*   < > 27.8*   PLATELETS AUTO x10*3/uL 138* 109* 138* 106*   < > 94*   LYMPHO PCT MAN %  --  3.4  --  0.0  --  3.4   MONO PCT MAN %  --  0.9  --  1.7  --  3.4   EOSINO PCT MAN %  --  0.0  --  0.9  --  0.0    < > = values in this interval not displayed.     Results from last 72 hours   Lab Units 04/08/24  1412 04/08/24  0544 04/07/24  1235   SODIUM mmol/L 135* 136 133*   POTASSIUM mmol/L 4.5 4.4 4.1   CHLORIDE mmol/L 99 100 97*   CO2 mmol/L 24 21 21   BUN mg/dL 15 15 14   CREATININE mg/dL 0.74 0.82 0.73   GLUCOSE mg/dL 157* 173* 187*   CALCIUM mg/dL 7.6* 7.6* 7.9*   ANION GAP mmol/L 17 19 19   EGFR mL/min/1.73m*2 >90 >90  >90   PHOSPHORUS mg/dL 3.0 2.7 2.8     Results from last 72 hours   Lab Units 04/08/24  1412 04/08/24  0544 04/07/24  1235 04/07/24  0531 04/06/24  1257 04/06/24  0911   ALK PHOS U/L  --  197*  --  151*  --  166*   BILIRUBIN TOTAL mg/dL  --  1.7*  --  1.7*  --  1.7*   BILIRUBIN DIRECT mg/dL  --  0.9*  --  0.9*  --  0.8*   PROTEIN TOTAL g/dL  --  5.7*  --  5.3*  --  5.6*   ALT U/L  --  16  --  15  --  17   AST U/L  --  39  --  46*  --  44*   ALBUMIN g/dL 3.9 3.6  3.7 3.7 3.5   < > 3.6    < > = values in this interval not displayed.     Estimated Creatinine Clearance: 106.3 mL/min (by C-G formula based on SCr of 0.74 mg/dL).  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 She presented to the ED with N/V in 2/2024 and was found with graft failure and cardiogenic shock. Treated for acute cellular versus antibody-mediated rejection with pulse steroids, IVIG/plasmapheresis x5, and 2 doses of thymoglobulin in 2-3/2024, but repeated biopsies were negative for significant rejection. Over this time she has had refractory cardiogenic shock requiring impella and ECMO support with course c/b ARF requiring RRT, ALI, hemolysis in setting of impella positioning, bilateral internal jugular DVTs, and coagulopathies. 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 We were consulted for HoTN, leukocytosis, and AMS. Localizing symptoms for us have largely been epigastric abdominal pain, dry cough, and mild AMS.     Nosocomial infection seems to be the most likely, but will consider OIs as well given her recent IS history. Hopefully CT imaging will provide us with an idea of where infection may be present. Despite her WBC count, her bands have interestingly normalized, which may suggest slow response to antimicrobials (with an associated leukemoid response while on steroids). Consideration of other etiologies as well (drug toxicities, etc) should also be pursued.    Recommendations:  -Would obtain Ucx if possible given UA  -Will follow-up CT chest/A/P  -Will follow-up sputum tests if possible to obtain  -Will follow-up pending serologic tests  -Continue current vancomycin, meropenem, and micafungin for now  -Prophylaxis: Bactrim, valgan 450 q48h     Seen with fellow Chris Wong MD. We will continue to follow. Please contact ID Team A (fellow Chris Wong MD or myself) via [employer] or [phone].    Jovany Nino MD

## 2024-04-08 NOTE — NURSING NOTE
Multidisciplinary ECMO Rounds Note    Attendance:  CT Surgeon: No  CTICU Attending: Addie, rounded with NP  Palliative Care Attending: No  Physical Therapy Present (Y/N): No  Perfusion Present (Y/N): No    ECMO Indication: cardiogenic shock  ECMO Cannula Configuration: Right fem fem     Changes made to Hemodynamic/Ventilator/ECMO Management: none at this time.     Circuit Concerns: There was concern for post oxygenator gas value but after ecmo ABG was redrawn on 100% fi02, values are WNL  Bleeding Concerns: none  Clotting/Ischemia Concerns: none  Anticoagulation Plan/Monitoring: systemic anticoagulation has been paused due to abnormally high INR. Team will continue to reevaluate     Mobility Plan/Candidacy: worked with PT today and sat at the side of th bed  Nutrition: ENT has been consulted to placed dobhoff due to past bleeding from nose and throat  Patient/Family Concerns: family fears pt will not get a transplant in time  Nursing Concerns: increasing delirium     Patients Minimally Acceptable Outcome: full recovery  Barriers to decannulation/anticipated decannulation date:Pt is awaiting heart and kidney transplant. This past weekend she became hypotensive and tachycardic cultures were sent. Cultures still are negative hypotension has somewhat resolved.    ECMO:     Most Recent Range Past 24hrs   Flow 2.95 Patient Flow (L/min)  Min: 2.94  Max: 3.15   Speed 3189 Circuit Flow (RPM)  Min: 3189  Max: 9191   Sweep 1 Sweep Gas Flow Rate (L/min)  Min: 1  Max: 1       dexmedeTOMIDine, 0.1-1.5 mcg/kg/hr, Last Rate: Stopped (04/08/24 0730)  [Held by provider] heparin, 500 Units/hr, Last Rate: 500 Units/hr (04/06/24 0800)  lactated Ringer's, 5 mL/hr, Last Rate: 5 mL/hr (04/08/24 1400)  PrismaSol 4/2.5, 2,400 mL/hr  sodium bicarbonate infusion Impella Purge 25 mEq/1000 mL D5W, 10 mL/hr, Last Rate: 10 mL/hr (04/08/24 1400)

## 2024-04-08 NOTE — PROGRESS NOTES
Physical Therapy    Physical Therapy Treatment    Patient Name: Charla Bowles  MRN: [id number]  Today's Date: 2024  Time Calculation  Start Time: 1304  Stop Time: 1332  Time Calculation (min): 28 min       Assessment/Plan   PT Assessment  PT Assessment Results: Decreased strength, Decreased endurance, Impaired balance, Decreased mobility  Rehab Prognosis: Good  Evaluation/Treatment Tolerance: Patient tolerated treatment well  Medical Staff Made Aware: Yes  End of Session Communication: Bedside nurse, PCT/NA/CTA  End of Session Patient Position: Bed, 3 rail up, Alarm off, not on at start of session  PT Plan  Inpatient/Swing Bed or Outpatient: Inpatient  PT Plan  Treatment/Interventions: Bed mobility, Transfer training, Balance training, Neuromuscular re-education, Strengthening, Endurance training, Therapeutic exercise, Therapeutic activity  PT Plan: Skilled PT  PT Frequency: 4 times per week  PT Discharge Recommendations: High intensity level of continued care  PT Recommended Transfer Status: Assist x2  PT - OK to Discharge: Yes      General Visit Information:   PT  Visit  PT Received On: 24  General  Family/Caregiver Present: Yes  Caregiver Feedback: x2 sons and sister present throughout session  Co-Treatment: OT  Co-Treatment Reason: Pt on ECMO requiring skilled 2 person assist for safe mobility  Prior to Session Communication: Bedside nurse, PCT/JUVE/ALLY  Patient Position Received: Bed, 3 rail up, Alarm off, not on at start of session  Preferred Learning Style: auditory, verbal  General Comment: Pt awake, alert and willing to particiapte in therapy session. On VA ECMO flow 2.96, 90% FiO2, sweep 1000. R axillary impella (P-3), flow 1.1. ECMO and impella examined pre, during and post mobility. Stable and secure. (CVVH, central line, , teley, maxwell)    Subjective   Precautions:  Precautions  Medical Precautions: Cardiac precautions, Fall precautions  Precautions Comment: R axillary impella  precautions- no pushing/pulling with R UE, No lifting R UE above shoulder height, no R UE humeral EXT past plane of body, R femoral ECMO precautions, MAP 70-90, protective precautions  Vital Signs:  Vital Signs  Heart Rate: (!) 115 (Post; 116)  Heart Rate Source: Monitor  Resp: 20 (Post: 18)  BP: 91/77 (Post; 85/70)  MAP (mmHg): 84 (Post: 78)  BP Location: Left arm  BP Method: Arterial line  Patient Position: Lying    Objective   Pain:  Pain Assessment  Pain Assessment:  (Pt wanting to lean laterally to assist with stretching back d/t discomfort however unrated)  Cognition:  Cognition  Overall Cognitive Status:  (AOx3 however delirius throughout session.  Cues for redirection and to remind pt of precautions with impella and ECMO.)  Arousal/Alertness: Delayed responses to stimuli  Following Commands: Follows one step commands with repetition  Activity Tolerance:  Activity Tolerance  Endurance: Tolerates 10 - 20 min exercise with multiple rests  Early Mobility/Exercise Safety Screen: Proceed with mobilization - No exclusion criteria met  Treatments:  Therapeutic Activity  Therapeutic Activity Performed: Yes  Therapeutic Activity 1: Increased time for skilled ICU line/tube management for safe functional mobility. RN assisting with ECMO line management, CNP watching ECMO numbers and OT/PT completing therapy session.  Therapeutic Activity 2: Pt sat EOB ~12 min with dependent assist.  Pt leaning posterior on therapist with strong L lateral push.  Pt wanting to lean to the L to stretch back with re-direction to bring trunk towards midline.  Attempting to get pt to hold onto hands anteriorly to decrease posterior support.  x1 bout of modAx1 when keeping trunk forward.    Bed Mobility  Bed Mobility: Yes  Bed Mobility 1  Bed Mobility 1: Supine to sitting, Sitting to supine  Level of Assistance 1: Dependent (x2)  Bed Mobility Comments 1: HOB elevated to complete transfer, draw sheet utilized  Bed Mobility 2  Bed Mobility  2:  Rolling right, Rolling left  Level of Assistance 2: Dependent (x1)  Bed Mobility Comments 2: HOB flat, draw sheet utilized  Bed Mobility 3  Bed Mobility 3:  (Boost towards HOB)  Level of Assistance 3: Dependent (x2)  Bed Mobility Comments 3: HOB flat, draw sheet utilized    Ambulation/Gait Training  Ambulation/Gait Training Performed: No  Transfers  Transfer: No    Outcome Measures:  [facility] Basic Mobility  Turning from your back to your side while in a flat bed without using bedrails: A lot  Moving from lying on your back to sitting on the side of a flat bed without using bedrails: A lot  Moving to and from bed to chair (including a wheelchair): Total  Standing up from a chair using your arms (e.g. wheelchair or bedside chair): A lot  To walk in hospital room: Total  Climbing 3-5 steps with railing: Total  Basic Mobility - Total Score: 9    FSS-ICU  Ambulation: Unable to attempt due to weakness  Rolling: Maximal assistance (performs 25% - 49% of task)  Sitting: Total assistance (performs 25% or requires another person)  Transfer Sit-to-Stand: Total assistance (performs 25% or requires another person)  Transfer Supine-to-Sit: Total assistance (performs 25% or requires another person)  Total Score: 5    Education Documentation  Precautions, taught by Michelle Lee PT at 4/8/2024  3:43 PM.  Learner: Patient  Readiness: Acceptance  Method: Explanation  Response: Needs Reinforcement    Body Mechanics, taught by Michelle Lee PT at 4/8/2024  3:43 PM.  Learner: Patient  Readiness: Acceptance  Method: Explanation  Response: Needs Reinforcement    Mobility Training, taught by Michelle Lee PT at 4/8/2024  3:43 PM.  Learner: Patient  Readiness: Acceptance  Method: Explanation  Response: Needs Reinforcement    Education Comments  No comments found.    EDUCATION:       Encounter Problems       Encounter Problems (Active)       Balance       Pt will demonstrate ability to complete sitting static/dynamic balance activities  with unilateral UE support and no LOB for increase in safety up D/C.  (Progressing)       Start:  04/02/24    Expected End:  04/16/24               Mobility       Pt will demonstrated ability to complete 5 lateral side steps with modAx1, LRAD, proper form and no balance deficits for safe DC. (Progressing)       Start:  04/02/24    Expected End:  04/16/24            Pt will demonstrate ability to complete ther ex activities to improve functional strength for increase in independence upon DC.  (Progressing)       Start:  04/02/24    Expected End:  04/16/24               PT Transfers       Pt will demonstrated ability to complete bed mobility and sit<>stand transfers with mod assistance x2 and use of assistive device to safely DC. (Progressing)       Start:  04/02/24    Expected End:  04/16/24

## 2024-04-08 NOTE — PROGRESS NOTES
CTICU Progress Note  Charla Bowles/[id number]    Admit Date: 2/15/2024  Hospital Length of Stay: 53   ICU Length of Stay: 11d 22h   CT SURGEON: Dr. Witt    SUBJECTIVE:   No acute events overnight. Remains on VA ECMO with impella. Precedex for sleep. Remains intermittently delirious.     MEDICATIONS  Infusions:  dexmedeTOMIDine, Last Rate: Stopped (04/08/24 0730)  [Held by provider] heparin, Last Rate: 500 Units/hr (04/06/24 0800)  lactated Ringer's, Last Rate: 5 mL/hr (04/08/24 1600)  PrismaSol 4/2.5  sodium bicarbonate infusion Impella Purge 25 mEq/1000 mL D5W, Last Rate: 10 mL/hr (04/08/24 1600)      Scheduled:  acetaminophen, 650 mg, q6h  bisacodyl, 10 mg, Once  calcium carbonate-vitamin D3, 1 tablet, Daily  cyanocobalamin, 500 mcg, Daily  darbepoetin floyd, 100 mcg, q14 days  ferrous sulfate (325 mg ferrous sulfate), 65 mg of iron, Daily with breakfast  folic acid, 1 mg, Daily  insulin lispro, 0-5 Units, TID with meals  levothyroxine, 200 mcg, Daily  lidocaine, 1 patch, Daily  melatonin, 10 mg, Daily  meropenem, 2 g, q12h  methocarbamol, 500 mg, q6h TRICIA  micafungin, 100 mg, q24h  multivitamin with minerals, 1 tablet, Daily  mycophenolate, 360 mg, BID  nystatin, 5 mL, q6h  oxygen, , Continuous - Inhalation  pantoprazole, 40 mg, Daily  phytonadione, 10 mg, Daily  predniSONE, 10 mg, Daily  psyllium, 1 packet, Daily  QUEtiapine, 50 mg, Nightly  rosuvastatin, 10 mg, Nightly  sennosides-docusate sodium, 1 tablet, BID  sod phos di, mono-K phos mono, 500 mg, 4x daily  sodium chloride, 1 spray, 4x daily  sulfamethoxazole-trimethoprim, 80 mg of trimethoprim, Daily  tacrolimus, 3.5 mg, q24h  tacrolimus, 4 mg, q24h  thiamine, 500 mg, TID  valGANciclovir, 450 mg, q48h  vancomycin, 1,000 mg, q12h      PRN:  alteplase, 2 mg, PRN  bisacodyl, 10 mg, Daily PRN  calcium gluconate, 1 g, q6h PRN  calcium gluconate, 2 g, q6h PRN  dextrose, 25 g, q15 min PRN  dextrose, 25 g, q15 min PRN   Or  glucagon, 1 mg, q15 min  "PRN  hydrALAZINE, 5 mg, q4h PRN  hydrOXYzine HCL, 25 mg, q6h PRN  ipratropium-albuteroL, 3 mL, q6h PRN  artificial tears, 2 drop, PRN  magnesium sulfate, 2 g, q6h PRN  magnesium sulfate, 4 g, q6h PRN  microfibrllar collagen, , PRN  mupirocin, , BID PRN  naloxone, 0.2 mg, q5 min PRN  ondansetron, 4 mg, q4h PRN  oxyCODONE, 5 mg, q6h PRN  oxygen, , Continuous PRN - O2/gases  sodium chloride, 1 spray, 4x daily PRN  vancomycin, , Daily PRN        PHYSICAL EXAM:   Visit Vitals  BP 92/78 Comment: post: 86/70   Pulse (!) 116   Temp 36.3 °C (97.3 °F)   Resp (!) 40   Ht 1.549 m (5' 0.98\")   Wt 84 kg (185 lb 3 oz)   SpO2 99%   BMI 35.01 kg/m²   OB Status Hysterectomy   Smoking Status Never   BSA 1.9 m²     Wt Readings from Last 5 Encounters:   04/06/24 84 kg (185 lb 3 oz)   12/07/23 92.1 kg (203 lb)   12/01/23 93 kg (205 lb)   11/29/23 92.9 kg (204 lb 12.8 oz)   11/09/23 91.3 kg (201 lb 3.2 oz)     INTAKE/OUTPUT:  I/O last 3 completed shifts:  In: 3156.4 (37.6 mL/kg) [P.O.:400; I.V.:773.1 (9.2 mL/kg); IV Piggyback:1983.3]  Out: 1585 (18.9 mL/kg) [Urine:100 (0 mL/kg/hr); Other:1485]  Weight: 84 kg        LDA:  ECMO Cannulation Peripheral Right;Femoral artery;Femoral vein (Active)   Placement Date/Time: 03/27/24 1700   ECMO Type: Peripheral  Cannulation Site: Right;Femoral artery;Femoral vein  Line Securement: Line secured in 2 locations;Securement device;Sutures   Number of days: 10       Arterial Line 03/27/24 Right Radial (Active)   Placement Date/Time: 03/27/24 1545   Orientation: Right  Location: Radial   Number of days: 10       Ventricular Assist Device Impella 5.5 (Active)   Placement Date/Time: 03/01/24 1956   Placed by: Dr Viramontes  Hand Hygiene Completed: Yes  Site Location: Axilla right  VAD Type: Left ventricular assist device  VAD Brand: Impella 5.5   Number of days: 36       Hemodialysis Cath 04/06/24 Triple lumen Right Non-tunneled catheter Jugular (Active)   Placement Date/Time: 04/06/24 1500   Hand Hygiene " Completed: Yes  Site Prep: Usual sterile procedure followed  Site Prep Agent has Completely Dried Before Insertion: Yes  All 5 Sterile Barriers Used (Gloves, Gown, Cap, Mask, Large Sterile Drape): Yes ...   Number of days: 0     Physical Exam:   - CONSTITUTION: Female patient lying in ICU bed, in no acute distress  - NEUROLOGIC: A+Ox2 but re-orientable, CAM positive, following in all 4 extremities  - CARDIOVASCULAR: ST, R fem VA ECMO, no bleeding at site. R axillary impella, no bleeding at site. No s/s infection at either site. +distal signals in bilateral lower extremities  - RESPIRATORY: Coarse, diminished bilateral lung sounds, symmetric chest expansion, tachypnea RA  - GI: Abdomen soft, non tender, non distended, +bowel sounds  - : Anuric, on CVVH via ECMO circuit  - EXTREMITIES: Warm and dry, no peripheral edema  - SKIN: Left femoral skin breakdown with dressing in place  - PSYCHIATRIC: Calm     Images: Reviewed    Invasive Hemodynamics:    Most Recent Range Past 24hrs   BP (Art) 88/70 Arterial Line BP 1  Min: 80/71  Max: 101/88   MAP(Art) 78 mmHg Arterial Line MAP 1 (mmHg)   Min: 75 mmHg  Max: 186 mmHg   RA/CVP   No data recorded   PA 41/34 No data recorded   PA(mean) 37 mmHg No data recorded   CO 5.5 L/min No data recorded   CI 2.8 L/min/m2 No data recorded   Mixed Venous 55.9 % SVO2 (%)  Min: 54 %  Max: 66.4 %   SVR  1115 (dyne*sec)/cm5 No data recorded     ECMO:     Most Recent Range Past 24hrs   Flow 2.89 Patient Flow (L/min)  Min: 2.88  Max: 3.15   Speed 3189 Circuit Flow (RPM)  Min: 3189  Max: 9191   Sweep 1 Sweep Gas Flow Rate (L/min)  Min: 1  Max: 1      Impella:      Most Recent Range Past 24hrs   Performance Level 3 P Level  Min: 3   Min taken time: 04/08/24 1600  Max: 3   Max taken time: 04/08/24 1600   Flow (L/min) 1.1 Flow (L/min)  Min: 0.5   Min taken time: 04/08/24 0800  Max: 1.3   Max taken time: 04/08/24 1500   Motor Current 165/124 Motor Current  Min: 152/114   Min taken time: 04/08/24 0800   Max: 176/115   Max taken time: 04/08/24 1000   Placement Signal No  Placement OK could not be evaluated. This SmartLink does not work with rows of the type: Custom List   Purge (mmHg) 444 Purge Pressure (mmHg)  Min: 397   Min taken time: 04/07/24 2000  Max: 584   Max taken time: 04/08/24 0500   Purge rate (mL/hr) 11.6 Purge Rate (mL/hr)  Min: 11.2   Min taken time: 04/08/24 1200  Max: 11.8   Max taken time: 04/08/24 0800        Daily Risk Screen:  Dialysis/Hemapheresis    Assessment/Plan   34F with a PMHx sig for stage D HFrEF (PPCM) s/p ICD s/p CardioMEMs, hypothyroidism 2/2 thyroidectomy, obesity s/p gastric bypass (2017), and SLE who is s/p OHT (3/31/2022) with a post-OHT course complicated by RIJ/RUE DVTs, leukopenia, left groin seroma, and asymptomatic 2R ACR (11/2022) treated with steroids, s/p total hysterectomy (2023), Flu A (1/2024).     Originally presented to [facility] ED on 2/15/24 with complaints of N/V x 3 days and inability to keep medications down. Found to have acute systolic heart failure with primary graft failure and cardiogenic shock. Treated for suspected acute cellular vs. acute antibody mediated rejection; however, multiple cardiac biopsies negative for signs of rejection or other causes of graft failure. Course has been complicated by multiple MCS devices for refractory cardiogenic shock (right femoral IABP: 2/18/24 - 3/1/24, Left femoral VA ECMO: 2/19/24 - 2/29/24, Right axillary impella 5.5: 3/1/24 - remains in place, 2nd right femoral VA ECMO: 3/27/24 - remains in place), ARF requiring RRT, acute liver injury, intubation due to volume overload in setting of pulmonary edema, severe hemolysis 2/2 to impella malposition, mild CMV viremia, bilateral provoked IJ DVTs, multiple episodes of epistaxis & coagulopathies requiring ongoing blood product transfusions.        Transferred to CTICU on 3/27/24 following right femoral VA ECMO placement due to worsening cardiogenic shock and multi-organ  failure despite Impella 5.5 support and multiple inotropes. Listed Status 1 for heart/kidney on 4/2, now deactivated to Status 7 with coagulopathy and c/f sepsis.      Plan:  NEURO: No history. Previously neuro intact with acute pain and ongoing insomnia, requiring Seroquel and precedex overnight for sleep, working well. . Orientation fluctuat, intermittently Cam positive/negative, oriented. Sat on edge of bed with PT today. Last week, was standing at side of bed. -->  - Serial neuro and pain assessments  - Continue tylenol scheduled  - Continue lidoderm patch for back pain  - PRN oxy 5mg Q46   - Continue melatonin 10 mg HS   - Continue Seroquel 50 mg HS     - Continue Robaxin 500 mg q6h x 48 hrs  - May use precedex at bedtime for sleep  - PRN hydroxyzine  - PT/OT Consult; mobilize as able  - CAM ICU score qshift. Sleep/wake cycle hygiene- rest Protocol     ENT: Multiple episodes of epistaxis with interventions by ENT. Most recently in OR 3/27 with L nare packed. Packing removed 4/1. -->  - appreciate ENT recs  - PRN ocean spray and mupiricin for dry nasal passages  - PRN afrin      Cardiac: Patient has a history of heart transplant in March of 2022 with primary graft dysfunction treated for acute cellular and antibody rejection without improvement with negative biopsy with multiple MCS devices for refractory cardiogenic shock (right femoral IABP: 2/18/24 - 3/1/24, Left femoral VA ECMO: 2/19/24 - 2/29/24, Right axillary impella 5.5: 3/1/24 - remains in place, 2nd right femoral VA ECMO: 3/27/24 - remains in place). Elevated LDH improved on P2. Multiple attempts at repositioning impella previously. ECHO 3/29 with impella potentially deep but not adjusted again. ECMO ~3.17L flow/100%/sweep 1.5, impella 5.5 P3 with flows ~0.8 L appropriate end organ function.  LDH stable. ST 120s, MAPs 65-70s. -->  - >Was listed as status 1, status 7 now with c/f sepsis and coagulopathy  - Maintain goal MAP 70-90  - Continue full BiV  support with ECMO  - Continue Impella at P3, for LV Venting, consider removal with inadequate positioning, low flows, c/f infection of unknown origin  - Continue rosuvastatin dose 10mg daily for impaired renal excretion  - Trend daily LDH   - Hold home amlodipine     Vascular: 3/27 arterial duplex with proximal SFA occlusion with collateral flow. C/f LLE ischemia (previous ECMO site) with loss of pulses- now monophasic with CK that had been normal and no longer trending. Feet warm with intact motor exam. -->  - appreciate vascular surgery following  - Vascular Surgery following for L SFA- No intervention needed at this time     PULM: No history. Intubated multiple times throughout hospital course for procedures; intubated 3/27 for OR. Acute respiratory failure persisting due to acute pulmonary edema had been improving, now increasing pulmonary congestion. RA, occasionally on NC for comfort.  -->  - CXR daily  - Adjust sweep for normal pH  - Maintain SPO2 > 92%     GI: History of gastric bypass and MMF colitis. Shock liver originally resolved; most recently elevated r/t likely congestive hepatopathy that is improving on ECMO. Abdominal pain improved with reduced myfortic dosing. Previous c/f GI bleed, likely blood from epistaxis; no current evidence of GI bleeding. H pylori negative, fecal calprotectin (elevated at 380), fecal lactoferrin (Positive 03/23/24). Last BM 4/9. Poor nutritional intake. Dobhoff placed by ENT today under fiberoptic visualization.  >  - Continue calcitriol 0.5mg daily, multivitamin, calcium carbonate 1,250mg BID  - Continue PPI daily  - Regular diet with TF, advance to goal  - Continue Ensure Clear TID and magic cup  - Continue miralax BID & senna-colace BID     : No history, baseline Cr 1.2-1.3. HIRAM originally on CVVH then with renal recovery but then again worsened and now dialysis dependent and failed diuretic challenges. Hypervolemia improved. Persisting hypophosphatemia despite  repeated supplementation. -->  - RFP as clinically indicated, replete electrolytes per CTICU protocol.   - Continue CVVH with goal removal -500 ml   - Continue Sodium Phos 500 mg 4x daily  - Nephrology Following     ENDO: History of hypothyroidism and prediabetes. TSH elevated originally to malabsorption then with dosing adjusted by endo; TSH (3/17): 20.74, T4 1.11, T3: 1.4, improved 4/1; TSH 10.13, T4 0.70. 4/8: TSH 19.48, T3 0.8, T4 0.68. Steroid induced hyperglycemia acceptable glycemic control on SSI. -->  - Maintain BG <180 with hypoglycemia protocol  - Continue SSI #1 AC/HS   - Continue Synthroid 200mcg daily.   - Endocrine following, will discuss thyroid function panel from today     HEME: History of iron deficiency anemia and remote DVT; again with acute DVT LIJ and SVT left cephalic vein and right cephalic vein. Acute blood loss anemia with repeated transfusions with significant hemolysis but no evidence of active bleeding; last received RBC 4/5. Stable thrombocytopenia persisting; last PF4 negative 3/30. No epistaxis today. Received 2 U PRBC 4/5. Coagulopathic with increasing INR but stable LFTs, 2.9 today. Possibly 2/2 malnutrition. Liver ultrasound 4/6 unconcerning. -->  - Continue ferrous sulfate daily  - Avoid unnecessarily transfusing, HGB > 7.0  - Vitamin K x 3 doses  - Holding ASA for CAD with thrombocytopenia  - Hold systemic heparin with elevated INR, Trend coags daily  - SCDs for DVT prophylaxis  - Aranesp every 2 weeks  - Last type and screen: 4/6     Rejection/prophylaxis: S/p heart transplant 3/31/2022. Induction basiliximab. Donor HCV -, toxo -/-, CMV -/+. Mild ACR with +CD4 and negative HLAs however clinical presentation concern for acute cellular vs AMR rejection/stuttering rejection completed courses of thymo/PLEX/IVIG without clinical improvement.  - Steroids: Continue PO prednisone 10 mg daily  - Tacrolimus: 4.0 mg AM/3.5 mg PM w/ daily levels drawn @ 0600  - Tacrolimus goal troughs:  8-10  - Continue Myfortic acid: 360mg BID.  Not starting MMF d/t hx of colitis  - Antifungals: nystatin 500,000units Q6  - Antivirals: valcyte 450mg Q48hrs  - Anti PCP & Toxoplasmosis: continue SS Bactrim with c/f PNA     ID: Afebrile. C/f previous yeast infection. BC 3/27 NGTD final. MRSA screen negative 3/28. Episode of hypotension on 4/1, pan cultured and empiric Vanco/Zosyn started.  Patient was shivering 4/3, concern for risk of infection. Blood cultures sent 4/3, NGTD. Zosyn (4/1- 4/7) then broadened to Susan (4-7 - ) and natalie started (4/7 - ).  -->  - Trend temp q4h  - Continue Susan and Natalie (4/7 - )  - Continue Vanco (4/6 - )  - Follow up cxs  - Send sputum culture if available  - ID consulted, requested labs sent (CMV PCR, RVP, COVID/flu (negative), serum beta b glucan, toxo PCR, crypto antigen, urine histo antigen)  - CT chest/abd/pelvis today     Skin: Left groin wound from previous ECMO with wound consulted. Wound cx with NG 3/15.   - Preventative Mepilex dressings in place on sacrum and heels  - Change preventative Mepilex weekly or more frequently as indicated (when moist/soiled)   - Every shift skin assessment per nursing and weekly ICU skin rounds  - Moisture barrier to be applied with christopher care  - Active skin problems addressed with nursing on daily rounds  - wound recs from note 3/15 ordered      Proph:  SCDs  Holding systemic heparin  PPI     G: Lines  RIJ Trialysis - 4/6  R radial maxwell 3/27  PIV x2    Restraints: Not indicated    Code status: Full Code    I have discussed the case with the resident/advanced practice provider. I have personally performed a history, physical exam, and my own medical decision making. I have reviewed the note and agree with the findings and plan with the following exceptions as identified below. I have personally provided 39 minutes of critical care time exclusive of time spent on separately billable procedures. Time includes review of laboratory data, radiology  results, discussion with consultants, family and monitoring for potential decompensation. Interventions were performed as documented above.      Family/Surrogate updated with plan of care.  Code status addressed/up to date.      Dispo: CTICU care for now.    CTICU TEAM PHONE [id number]

## 2024-04-08 NOTE — PROGRESS NOTES
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   Result Value Ref Range    POCT Glucose 184 (H) 74 - 99 mg/dL     *Note: Due to a large number of results and/or encounters for the requested time period, some results have not been displayed. A complete set of results can be found in Results Review.         PROCEDURE NOTE:  Nasopharyngolaryngoscopy and placement of Dobbhoff tube    For better visualization because of need for Dobbhoff tube placement, a flexible fiberoptic nasopharyngolaryngoscopy was performed. Patient was correctly identified and verbal consent obtained.  After topical anesthesia with atomized 4% Lidocaine with Afrin, the flexible scope was introduced into the left naris.  The Dobbhoff tube was then placed in the right naris and visualized entering the upper esophageal inlet and advanced to 55 cm and bridled in place.        Assessment  The patient is a 34 y.o. female who ENT was re-engaged to place a Dobbhoff tube under direct video visualization.     Recommendations  -Primary team to follow-up KUB and determine proper placement in the stomach/postpyloric  -Primary team to advance as needed  -Primary team to determine when starting  tube feeds or medications through feeding tube      Aaron Mendoza DO PGY-2  Otolaryngology - Head & Neck Surgery  [facility]     ENT Consult pager: [phone]  ENT Outpatient scheduling number: [phone]  Please Page if Urgent

## 2024-04-08 NOTE — PROGRESS NOTES
"        INPATIENT TRANSPLANT NEPHROLOGY PROGRESS NOTE          REASON FOR CONSULT: CRRT mgt    SUBJECTION:  [facility] Procedure note  -Patient somnolent this morning. Minimal interaction     ECMO-3.17 L flow/100%/sweep 1.5, Impella 5.5 P2 with flows approximately 0.8 L    PHYCISCAL EXAMINATION:    Visit Vitals  BP 92/80 (BP Location: Left arm, Patient Position: Lying) Comment: Post: 89/75   Pulse 106   Temp 37.1 °C (98.8 °F) (Temporal)   Resp (!) 51   Ht 1.549 m (5' 0.98\")   Wt 84 kg (185 lb 3 oz)   SpO2 100%   BMI 35.01 kg/m²   OB Status Hysterectomy   Smoking Status Never   BSA 1.9 m²        04/06 1900 - 04/08 0659  In: 3156.4 [P.O.:400; I.V.:773.1]  Out: 1585 [Urine:100]     Weight change:       Pulmonary:      Effort: Pulmonary effort is normal. No respiratory distress.      Breath sounds: Normal breath sounds.   Chest:      Comments: Right axillary impella site CDI   Abdominal:      General: Bowel sounds are normal.      Palpations: Abdomen is soft.      Tenderness: There is no abdominal tenderness.   Musculoskeletal:      -No LE edema     General: Skin is warm and dry.      Comments: Left groin ECMO cannulation site with drainage    Neurological:   Somnolent, minimal interaction    MEDICATION LIST: REVIEWED    acetaminophen, 650 mg, q6h  bisacodyl, 10 mg, Once  calcium carbonate-vitamin D3, 1 tablet, Daily  cyanocobalamin, 500 mcg, Daily  darbepoetin floyd, 100 mcg, q14 days  ferrous sulfate (325 mg ferrous sulfate), 65 mg of iron, Daily with breakfast  folic acid, 1 mg, Daily  insulin lispro, 0-5 Units, TID with meals  levothyroxine, 200 mcg, Daily  lidocaine, 1 patch, Daily  melatonin, 10 mg, Daily  meropenem, 2 g, q12h  methocarbamol, 500 mg, q6h TRICIA  micafungin, 100 mg, q24h  multivitamin with minerals, 1 tablet, Daily  mycophenolate, 360 mg, BID  nystatin, 5 mL, q6h  oxygen, , Continuous - Inhalation  pantoprazole, 40 mg, Daily  phytonadione, 10 mg, Daily  predniSONE, 10 mg, Daily  psyllium, 1 packet, " Daily  QUEtiapine, 50 mg, Nightly  rosuvastatin, 10 mg, Nightly  sennosides-docusate sodium, 1 tablet, BID  sod phos di, mono-K phos mono, 500 mg, 4x daily  sodium chloride, 1 spray, 4x daily  sulfamethoxazole-trimethoprim, 80 mg of trimethoprim, Daily  tacrolimus, 3.5 mg, q24h  tacrolimus, 4 mg, q24h  thiamine, 500 mg, TID  valGANciclovir, 450 mg, q48h  vancomycin, 1,000 mg, q12h      dexmedeTOMIDine, Last Rate: Stopped (04/08/24 0730)  [Held by provider] heparin, Last Rate: 500 Units/hr (04/06/24 0800)  lactated Ringer's, Last Rate: Stopped (04/08/24 0857)  PrismaSol 4/2.5  sodium bicarbonate infusion Impella Purge 25 mEq/1000 mL D5W, Last Rate: 10 mL/hr (04/08/24 0800)      alteplase, 2 mg, PRN  bisacodyl, 10 mg, Daily PRN  calcium gluconate, 1 g, q6h PRN  calcium gluconate, 2 g, q6h PRN  dextrose, 25 g, q15 min PRN  dextrose, 25 g, q15 min PRN   Or  glucagon, 1 mg, q15 min PRN  hydrALAZINE, 5 mg, q4h PRN  hydrOXYzine HCL, 25 mg, q6h PRN  ipratropium-albuteroL, 3 mL, q6h PRN  artificial tears, 2 drop, PRN  magnesium sulfate, 2 g, q6h PRN  magnesium sulfate, 4 g, q6h PRN  microfibrllar collagen, , PRN  mupirocin, , BID PRN  naloxone, 0.2 mg, q5 min PRN  ondansetron, 4 mg, q4h PRN  oxyCODONE, 5 mg, q6h PRN  oxygen, , Continuous PRN - O2/gases  sodium chloride, 1 spray, 4x daily PRN  vancomycin, , Daily PRN        ALLERGY:  Allergies   Allergen Reactions    Dapagliflozin GI bleeding and Bleeding     UTI    Urinary tract infection    Empagliflozin Unknown    Myfortic [Mycophenolate Sodium] GI Upset    Topiramate Nausea Only and Nausea/vomiting    Latex Rash       LABS:  Results for orders placed or performed during the hospital encounter of 02/15/24 (from the past 24 hour(s))   Renal Function Panel   Result Value Ref Range    Glucose 187 (H) 74 - 99 mg/dL    Sodium 133 (L) 136 - 145 mmol/L    Potassium 4.1 3.5 - 5.3 mmol/L    Chloride 97 (L) 98 - 107 mmol/L    Bicarbonate 21 21 - 32 mmol/L    Anion Gap 19 10 - 20  mmol/L    Urea Nitrogen 14 6 - 23 mg/dL    Creatinine 0.73 0.50 - 1.05 mg/dL    eGFR >90 >60 mL/min/1.73m*2    Calcium 7.9 (L) 8.6 - 10.6 mg/dL    Phosphorus 2.8 2.5 - 4.9 mg/dL    Albumin 3.7 3.4 - 5.0 g/dL   CBC   Result Value Ref Range    WBC 12.9 (H) 4.4 - 11.3 x10*3/uL    nRBC 2.0 (H) 0.0 - 0.0 /100 WBCs    RBC 2.67 (L) 4.00 - 5.20 x10*6/uL    Hemoglobin 7.7 (L) 12.0 - 16.0 g/dL    Hematocrit 23.4 (L) 36.0 - 46.0 %    MCV 88 80 - 100 fL    MCH 28.8 26.0 - 34.0 pg    MCHC 32.9 32.0 - 36.0 g/dL    RDW 20.1 (H) 11.5 - 14.5 %    Platelets 138 (L) 150 - 450 x10*3/uL   Calcium, ionized   Result Value Ref Range    POCT Calcium, Ionized 1.06 (L) 1.1 - 1.33 mmol/L   POCT GLUCOSE   Result Value Ref Range    POCT Glucose 186 (H) 74 - 99 mg/dL   Urinalysis with Reflex Microscopic   Result Value Ref Range    Color, Urine Orange (N) Light-Yellow, Yellow, Dark-Yellow    Appearance, Urine Ex.Turbid (N) Clear    Specific Gravity, Urine 1.032 1.005 - 1.035    pH, Urine 6.0 5.0, 5.5, 6.0, 6.5, 7.0, 7.5, 8.0    Protein, Urine 300 (3+) (A) NEGATIVE, 10 (TRACE), 20 (TRACE) mg/dL    Glucose, Urine 70 (1+) (A) Normal mg/dL    Blood, Urine 1.0 (3+) (A) NEGATIVE    Ketones, Urine NEGATIVE NEGATIVE mg/dL    Bilirubin, Urine NEGATIVE NEGATIVE    Urobilinogen, Urine Normal Normal mg/dL    Nitrite, Urine NEGATIVE NEGATIVE    Leukocyte Esterase, Urine 25 Angelito/µL (A) NEGATIVE   Microscopic Only, Urine   Result Value Ref Range    WBC, Urine 21-50 (A) 1-5, NONE /HPF    RBC, Urine 11-20 (A) NONE, 1-2, 3-5 /HPF    Renal Epithelial Cells, Urine 1-2 (FEW) Reference range not established. /HPF   POCT GLUCOSE   Result Value Ref Range    POCT Glucose 158 (H) 74 - 99 mg/dL   Tacrolimus level   Result Value Ref Range    Tacrolimus  6.4 <=15.0 ng/mL   Reticulocytes   Result Value Ref Range    Retic % 3.7 (H) 0.5 - 2.0 %    Retic Absolute 0.087 (H) 0.018 - 0.083 x10*6/uL    Reticulocyte Hemoglobin 27 (L) 28 - 38 pg    Immature Retic fraction 32.0 (H)  <=16.0 %   CBC and Auto Differential   Result Value Ref Range    WBC 13.6 (H) 4.4 - 11.3 x10*3/uL    nRBC 2.1 (H) 0.0 - 0.0 /100 WBCs    RBC 2.35 (L) 4.00 - 5.20 x10*6/uL    Hemoglobin 6.9 (L) 12.0 - 16.0 g/dL    Hematocrit 20.9 (L) 36.0 - 46.0 %    MCV 89 80 - 100 fL    MCH 29.4 26.0 - 34.0 pg    MCHC 33.0 32.0 - 36.0 g/dL    RDW 20.3 (H) 11.5 - 14.5 %    Platelets 109 (L) 150 - 450 x10*3/uL    Immature Granulocytes %, Automated 2.1 (H) 0.0 - 0.9 %    Immature Granulocytes Absolute, Automated 0.29 0.00 - 0.70 x10*3/uL   Lactate Dehydrogenase   Result Value Ref Range    LDH 1,136 (H) 84 - 246 U/L   Hepatic function panel   Result Value Ref Range    Albumin 3.7 3.4 - 5.0 g/dL    Bilirubin, Total 1.7 (H) 0.0 - 1.2 mg/dL    Bilirubin, Direct 0.9 (H) 0.0 - 0.3 mg/dL    Alkaline Phosphatase 197 (H) 33 - 110 U/L    ALT 16 7 - 45 U/L    AST 39 9 - 39 U/L    Total Protein 5.7 (L) 6.4 - 8.2 g/dL   TSH with reflex to Free T4 if abnormal   Result Value Ref Range    Thyroid Stimulating Hormone 19.48 (H) 0.44 - 3.98 mIU/L   T3, free   Result Value Ref Range    Triiodothyronine, Free 0.8 (L) 2.3 - 4.2 pg/mL   ECMO VENOUS FULL PANEL   Result Value Ref Range    POCT pH, Venous ECMO 7.21 Reference range not established pH    POCT pCO2, Venous ECMO 63 Reference range not established mm Hg    POCT pO2,  Venous  ECMO 30 Reference range not established mm Hg    POCT SO2, Venous ECMO 54 Reference range not established %    POCT Oxy Hemoglobin, Venous ECMO 52.7 Reference range not established %    POCT Hematocrit Calculated, Venous ECMO 22.0 (L) 36.0 - 46.0 %    POCT Sodium, Venous ECMO 133 (L) 136 - 145 mmol/L    POCT Potassium, Venous ECMO 4.5 3.5 - 5.3 mmol/L    POCT Chloride, Venous ECMO 102 98 - 107 mmol/L    POCT Ionized Calcium, Venous ECMO 1.12 1.10 - 1.33 mmol/L    POCT Glucose, Venous ECMO 175 (H) 74 - 99 mg/dL    POCT Lactate Venous ECMO 0.8 0.4 - 2.0 mmol/L    POCT Base Excess, Venous ECMO -2.8 Reference range not established  mmol/L    POCT HCO3 Calculated, Venous ECMO 25.2 Reference range not established mmol/L    POCT Hemoglobin, Venous ECMO 7.4 (L) 12.0 - 16.0 g/dL    POCT Anion Gap, Venous ECMO 10 Reference range not established mmo/L    Patient Temperature 37.0 degrees Celsius    FiO2 90 %   Thyroxine, Free   Result Value Ref Range    Thyroxine, Free 0.63 (L) 0.78 - 1.48 ng/dL   Manual Differential   Result Value Ref Range    Neutrophils %, Manual 91.4 40.0 - 80.0 %    Bands %, Manual 4.3 0.0 - 5.0 %    Lymphocytes %, Manual 3.4 13.0 - 44.0 %    Monocytes %, Manual 0.9 2.0 - 10.0 %    Eosinophils %, Manual 0.0 0.0 - 6.0 %    Basophils %, Manual 0.0 0.0 - 2.0 %    Seg Neutrophils Absolute, Manual 12.43 (H) 1.20 - 7.00 x10*3/uL    Bands Absolute, Manual 0.58 0.00 - 0.70 x10*3/uL    Lymphocytes Absolute, Manual 0.46 (L) 1.20 - 4.80 x10*3/uL    Monocytes Absolute, Manual 0.12 0.10 - 1.00 x10*3/uL    Eosinophils Absolute, Manual 0.00 0.00 - 0.70 x10*3/uL    Basophils Absolute, Manual 0.00 0.00 - 0.10 x10*3/uL    Total Cells Counted 116     Neutrophils Absolute, Manual 13.01 (H) 1.20 - 7.70 x10*3/uL    RBC Morphology See Below     Polychromasia Mild     Hypochromia Mild     RBC Fragments Few     Ovalocytes Few     Acanthocytes Few    Renal function panel   Result Value Ref Range    Glucose 173 (H) 74 - 99 mg/dL    Sodium 136 136 - 145 mmol/L    Potassium 4.4 3.5 - 5.3 mmol/L    Chloride 100 98 - 107 mmol/L    Bicarbonate 21 21 - 32 mmol/L    Anion Gap 19 10 - 20 mmol/L    Urea Nitrogen 15 6 - 23 mg/dL    Creatinine 0.82 0.50 - 1.05 mg/dL    eGFR >90 >60 mL/min/1.73m*2    Calcium 7.6 (L) 8.6 - 10.6 mg/dL    Phosphorus 2.7 2.5 - 4.9 mg/dL    Albumin 3.6 3.4 - 5.0 g/dL   Lipase   Result Value Ref Range    Lipase 9 9 - 82 U/L   ECMO ARTERIAL FULL PANEL   Result Value Ref Range    POCT pH, Arterial ECMO 7.22 Reference range not established pH    POCT pCO2, Arterial ECMO 61 Reference range not established mm Hg    POCT pO2,  Arterial ECMO 275  Reference range not established mm Hg    POCT SO2, Arterial ECMO 100 Reference range not established %    POCT Oxy Hemoglobin, Arterial ECMO 96.6 Reference range not established %    POCT Hematocrit Calculated, Arterial ECMO 23.0 (L) 36.0 - 46.0 %    POCT Sodium, Arterial  ECMO 131 (L) 136 - 145 mmol/L    POCT Potassium, Arterial  ECMO 4.4 3.5 - 5.3 mmol/L    POCT Chloride, Arterial  ECMO 101 98 - 107 mmol/L    POCT Ionized Calcium, Arterial  ECMO 1.10 1.10 - 1.33 mmol/L    POCT Glucose, Arterial  ECMO 181 (H) 74 - 99 mg/dL    POCT Lactate Arterial  ECMO 0.9 0.4 - 2.0 mmol/L    POCT Base Excess, Arterial ECMO -2.8 Reference range not established mmol/L    POCT HCO3 Calculated, Arterial ECMO 25.0 Reference range not established mmol/L    POCT Hemoglobin, Arterial  ECMO 7.7 (L) 12.0 - 16.0 g/dL    POCT Anion Gap, Arterial  ECMO 9 Reference range not established mmo/L    Patient Temperature 37.0 degrees Celsius    FiO2 90 %   ECMO VENOUS FULL PANEL   Result Value Ref Range    POCT pH, Venous ECMO 7.21 Reference range not established pH    POCT pCO2, Venous ECMO 59 Reference range not established mm Hg    POCT pO2,  Venous  ECMO 31 Reference range not established mm Hg    POCT SO2, Venous ECMO 57 Reference range not established %    POCT Oxy Hemoglobin, Venous ECMO 54.9 Reference range not established %    POCT Hematocrit Calculated, Venous ECMO 22.0 (L) 36.0 - 46.0 %    POCT Sodium, Venous ECMO 134 (L) 136 - 145 mmol/L    POCT Potassium, Venous ECMO 4.2 3.5 - 5.3 mmol/L    POCT Chloride, Venous ECMO 100 98 - 107 mmol/L    POCT Ionized Calcium, Venous ECMO 1.08 (L) 1.10 - 1.33 mmol/L    POCT Glucose, Venous ECMO 165 (H) 74 - 99 mg/dL    POCT Lactate Venous ECMO 0.7 0.4 - 2.0 mmol/L    POCT Base Excess, Venous ECMO -4.2 Reference range not established mmol/L    POCT HCO3 Calculated, Venous ECMO 23.6 Reference range not established mmol/L    POCT Hemoglobin, Venous ECMO 7.3 (L) 12.0 - 16.0 g/dL    POCT Anion Gap, Venous  ECMO 15 Reference range not established mmo/L    Patient Temperature 37.0 degrees Celsius    FiO2 90 %   Coagulation Screen   Result Value Ref Range    Protime 32.7 (H) 9.8 - 12.8 seconds    INR 2.9 (H) 0.9 - 1.1    aPTT 53 (H) 27 - 38 seconds   Sars-CoV-2 and Influenza A/B PCR   Result Value Ref Range    Flu A Result Not Detected Not Detected    Flu B Result Not Detected Not Detected    Coronavirus 2019, PCR Not Detected Not Detected   POCT GLUCOSE   Result Value Ref Range    POCT Glucose 184 (H) 74 - 99 mg/dL     *Note: Due to a large number of results and/or encounters for the requested time period, some results have not been displayed. A complete set of results can be found in Results Review.        ASSESSMENT AND PLAN:    Ms. Yimi Bowles is a 34F with a PMHx sig for stage D HFrEF (PPCM) s/p ICD s/p CardioMEMs, hypothyroidism 2/2 thyroidectomy, obesity s/p gastric bypass (2017), and SLE who is s/p OHT (3/31/2022) with a post-OHT course complicated by RIJ/RUE DVTs, leukopenia, left groin seroma, and asymptomatic 2R ACR (11/2022) treated with steroids, s/p total hysterectomy (2023), Flu A (1/2024).  Who initially presented on 2/15/2024 in the setting of acute systolic heart failure with the graft failure and cardiogenic shock.  Patient currently is on [facility] ECMO support and Impella with ongoing acute renal failure requiring CRRT.    Principal Problem:    Cardiogenic shock (CMS/[facility])  Active Problems:    Heart transplant recipient (CMS/[facility])    ESRD (end stage renal disease) on dialysis (CMS/[facility])    HIRAM (acute kidney injury) (CMS/[facility])    Acute passive congestion of liver    Hyponatremia    Iron deficiency anemia    Abdominal pain    Cardiac transplant rejection (CMS/[facility])    Acute combined systolic (congestive) and diastolic (congestive) heart failure (CMS/[facility])      CRRT Management Note:  #HIRAM-D, 2/2 ATN in setting of cardiogenic shock. Started on CRRT initially 2/19 and transitioned to iHD however unable to achieve  optimal fluid management so transitioned back to CRRT   - Hemodynamically stable on full vent support with ECMO and Impella. Goal fluid removal - Net -100ml/hr. Will continue [facility] 4K.   -Please replete ionized calcium and phosphorus as per the CRRT protocol.  -Of note patient is currently status 7 for heart kidney transplant listed  on 4/2/2024.  Secondary to sepsis versus pneumonia.    Josue Gil,   PGY 4 Nephrology Fellow

## 2024-04-08 NOTE — PROGRESS NOTES
[facility]  ADVANCED HEART FAILURE AND TRANSPLANT CARDIOLOGY   Charla Bowles/[id number]    Admit Date: 2/15/2024  Hospital Length of Stay: 53   ICU Length of Stay: 11d 13h   Primary Service: CTICU      Charla Bowles is a 34 y.o. female on day 53 of admission presenting with Cardiogenic shock (CMS/HCC).    Subjective   This morning she is very anxious and delirious, requiring precedex to be resumed.  She remains tachypneic. No signs of active bleeding on examination, Hgb noted to be 6.9.         Objective     Physical Exam  Constitutional:       Appearance: She is ill-appearing.   HENT:      Head: Normocephalic.      Comments: Not actively bleeding      Mouth/Throat:      Mouth: Mucous membranes are moist.      Pharynx: Oropharynx is clear. No oropharyngeal exudate or posterior oropharyngeal erythema.   Eyes:      Extraocular Movements: Extraocular movements intact.      Conjunctiva/sclera: Conjunctivae normal.      Pupils: Pupils are equal, round, and reactive to light.   Neck:      Vascular: No JVD.   Cardiovascular:      Rate and Rhythm: Tachycardia present.      Comments: Right axillary impella in place ~45cm at hub (no hematoma), Right femoral VA ECMO in place with reperfusion catheter (site appears clean and dry). Dopplerable radial and ulnar pulses bilaterally. Dopplerable DP/PT pulses bilateral.   Pulmonary:      Effort: No accessory muscle usage, respiratory distress or retractions.      Breath sounds: Normal breath sounds. No wheezing, rhonchi or rales.      Comments: RA - 2L NC. Tachypnea w/ RR 30-50, although does not appear in respiratory distress.    Abdominal:      General: Abdomen is flat. Bowel sounds are normal. There is no distension.      Palpations: Abdomen is soft. There is no mass.      Hernia: No hernia is present.   Musculoskeletal:         General: Swelling (+2 BLE edema) present. No tenderness. Normal range of motion.      Cervical back: Full  "passive range of motion without pain.   Skin:     General: Skin is dry.      Capillary Refill: Capillary refill takes less than 2 seconds.      Coloration: Skin is not jaundiced.      Findings: Bruising and lesion (Left groin wound appears clean with granulation tissue. No signs of infection.) present. No erythema, laceration, rash or wound.   Neurological:      General: No focal deficit present.      Mental Status: She is alert. She is disoriented and confused.   Psychiatric:         Mood and Affect: Mood is anxious.         Behavior: Behavior normal.         Last Recorded Vitals  Blood pressure 92/80, pulse (!) 112, temperature 37 °C (98.6 °F), resp. rate (!) 32, height 1.549 m (5' 0.98\"), weight 84 kg (185 lb 3 oz), SpO2 100 %.  Intake/Output last 3 Shifts:  I/O last 3 completed shifts:  In: 2756.4 (32.8 mL/kg) [P.O.:400; I.V.:773.1 (9.2 mL/kg); IV Piggyback:1583.3]  Out: 1585 (18.9 mL/kg) [Urine:100 (0 mL/kg/hr); Other:1485]  Weight: 84 kg     Relevant Results  Scheduled medications  acetaminophen, 650 mg, oral, q6h  bisacodyl, 10 mg, rectal, Once  calcium carbonate-vitamin D3, 1 tablet, oral, Daily  cyanocobalamin, 500 mcg, oral, Daily  darbepoetin floyd, 100 mcg, subcutaneous, q14 days  ferrous sulfate (325 mg ferrous sulfate), 65 mg of iron, oral, Daily with breakfast  folic acid, 1 mg, oral, Daily  insulin lispro, 0-5 Units, subcutaneous, TID with meals  levothyroxine, 200 mcg, oral, Daily  lidocaine, 1 patch, transdermal, Daily  melatonin, 10 mg, oral, Daily  meropenem, 2 g, intravenous, q12h  methocarbamol, 500 mg, oral, q6h TRICIA  micafungin, 100 mg, intravenous, q24h  multivitamin with minerals, 1 tablet, oral, Daily  mycophenolate, 360 mg, oral, BID  nystatin, 5 mL, Swish & Swallow, q6h  oxygen, , inhalation, Continuous - Inhalation  pantoprazole, 40 mg, intravenous, Daily  predniSONE, 10 mg, oral, Daily  psyllium, 1 packet, oral, Daily  QUEtiapine, 50 mg, oral, Nightly  rosuvastatin, 10 mg, oral, " Nightly  sennosides-docusate sodium, 1 tablet, oral, BID  sod phos di, mono-K phos mono, 500 mg, oral, 4x daily  sodium chloride, 1 spray, Each Nostril, 4x daily  sulfamethoxazole-trimethoprim, 80 mg of trimethoprim, oral, Daily  tacrolimus, 3.5 mg, oral, q24h  tacrolimus, 4 mg, oral, q24h  thiamine, 500 mg, intravenous, TID  valGANciclovir, 450 mg, oral, q48h  vancomycin, 1,000 mg, intravenous, q12h      Continuous medications  dexmedeTOMIDine, 0.1-1.5 mcg/kg/hr, Last Rate: 0.2 mcg/kg/hr (04/08/24 0700)  [Held by provider] heparin, 500 Units/hr, Last Rate: 500 Units/hr (04/06/24 0800)  lactated Ringer's, 5 mL/hr, Last Rate: 5 mL/hr (04/08/24 0700)  PrismaSol 4/2.5, 2,400 mL/hr  sodium bicarbonate infusion Impella Purge 25 mEq/1000 mL D5W, 10 mL/hr, Last Rate: 10 mL/hr (04/08/24 0700)      PRN medications  PRN medications: alteplase, bisacodyl, calcium gluconate, calcium gluconate, dextrose, dextrose **OR** glucagon, hydrALAZINE, hydrOXYzine HCL, ipratropium-albuteroL, artificial tears, magnesium sulfate, magnesium sulfate, microfibrllar collagen, mupirocin, naloxone, ondansetron, oxyCODONE, oxygen, sodium chloride, vancomycin     Results for orders placed or performed during the hospital encounter of 02/15/24 (from the past 24 hour(s))   Procalcitonin   Result Value Ref Range    Procalcitonin 1.29 (H) <=0.07 ng/mL   Fibrinogen   Result Value Ref Range    Fibrinogen 550 (H) 200 - 400 mg/dL   POCT GLUCOSE   Result Value Ref Range    POCT Glucose 153 (H) 74 - 99 mg/dL   Vancomycin   Result Value Ref Range    Vancomycin 13.5 5.0 - 20.0 ug/mL   Blood Culture    Specimen: Peripheral Venipuncture; Blood culture   Result Value Ref Range    Blood Culture Loaded on Instrument - Culture in progress    Blood Culture    Specimen: Peripheral Venipuncture; Blood culture   Result Value Ref Range    Blood Culture Loaded on Instrument - Culture in progress    Blood Gas Arterial Full Panel   Result Value Ref Range    POCT pH,  Arterial 7.34 (L) 7.38 - 7.42 pH    POCT pCO2, Arterial 42 38 - 42 mm Hg    POCT pO2, Arterial 213 (H) 85 - 95 mm Hg    POCT SO2, Arterial 100 94 - 100 %    POCT Oxy Hemoglobin, Arterial 95.8 94.0 - 98.0 %    POCT Hematocrit Calculated, Arterial 23.0 (L) 36.0 - 46.0 %    POCT Sodium, Arterial 133 (L) 136 - 145 mmol/L    POCT Potassium, Arterial 3.7 3.5 - 5.3 mmol/L    POCT Chloride, Arterial 101 98 - 107 mmol/L    POCT Ionized Calcium, Arterial 1.08 (L) 1.10 - 1.33 mmol/L    POCT Glucose, Arterial 157 (H) 74 - 99 mg/dL    POCT Lactate, Arterial 1.0 0.4 - 2.0 mmol/L    POCT Base Excess, Arterial -2.9 (L) -2.0 - 3.0 mmol/L    POCT HCO3 Calculated, Arterial 22.7 22.0 - 26.0 mmol/L    POCT Hemoglobin, Arterial 7.8 (L) 12.0 - 16.0 g/dL    POCT Anion Gap, Arterial 13 10 - 25 mmo/L    Patient Temperature 37.0 degrees Celsius    FiO2 5 %   Renal Function Panel   Result Value Ref Range    Glucose 187 (H) 74 - 99 mg/dL    Sodium 133 (L) 136 - 145 mmol/L    Potassium 4.1 3.5 - 5.3 mmol/L    Chloride 97 (L) 98 - 107 mmol/L    Bicarbonate 21 21 - 32 mmol/L    Anion Gap 19 10 - 20 mmol/L    Urea Nitrogen 14 6 - 23 mg/dL    Creatinine 0.73 0.50 - 1.05 mg/dL    eGFR >90 >60 mL/min/1.73m*2    Calcium 7.9 (L) 8.6 - 10.6 mg/dL    Phosphorus 2.8 2.5 - 4.9 mg/dL    Albumin 3.7 3.4 - 5.0 g/dL   CBC   Result Value Ref Range    WBC 12.9 (H) 4.4 - 11.3 x10*3/uL    nRBC 2.0 (H) 0.0 - 0.0 /100 WBCs    RBC 2.67 (L) 4.00 - 5.20 x10*6/uL    Hemoglobin 7.7 (L) 12.0 - 16.0 g/dL    Hematocrit 23.4 (L) 36.0 - 46.0 %    MCV 88 80 - 100 fL    MCH 28.8 26.0 - 34.0 pg    MCHC 32.9 32.0 - 36.0 g/dL    RDW 20.1 (H) 11.5 - 14.5 %    Platelets 138 (L) 150 - 450 x10*3/uL   Calcium, ionized   Result Value Ref Range    POCT Calcium, Ionized 1.06 (L) 1.1 - 1.33 mmol/L   POCT GLUCOSE   Result Value Ref Range    POCT Glucose 186 (H) 74 - 99 mg/dL   Urinalysis with Reflex Microscopic   Result Value Ref Range    Color, Urine Orange (N) Light-Yellow, Yellow,  Dark-Yellow    Appearance, Urine Ex.Turbid (N) Clear    Specific Gravity, Urine 1.032 1.005 - 1.035    pH, Urine 6.0 5.0, 5.5, 6.0, 6.5, 7.0, 7.5, 8.0    Protein, Urine 300 (3+) (A) NEGATIVE, 10 (TRACE), 20 (TRACE) mg/dL    Glucose, Urine 70 (1+) (A) Normal mg/dL    Blood, Urine 1.0 (3+) (A) NEGATIVE    Ketones, Urine NEGATIVE NEGATIVE mg/dL    Bilirubin, Urine NEGATIVE NEGATIVE    Urobilinogen, Urine Normal Normal mg/dL    Nitrite, Urine NEGATIVE NEGATIVE    Leukocyte Esterase, Urine 25 Angelito/µL (A) NEGATIVE   Microscopic Only, Urine   Result Value Ref Range    WBC, Urine 21-50 (A) 1-5, NONE /HPF    RBC, Urine 11-20 (A) NONE, 1-2, 3-5 /HPF    Renal Epithelial Cells, Urine 1-2 (FEW) Reference range not established. /HPF   POCT GLUCOSE   Result Value Ref Range    POCT Glucose 158 (H) 74 - 99 mg/dL   Reticulocytes   Result Value Ref Range    Retic % 3.7 (H) 0.5 - 2.0 %    Retic Absolute 0.087 (H) 0.018 - 0.083 x10*6/uL    Reticulocyte Hemoglobin 27 (L) 28 - 38 pg    Immature Retic fraction 32.0 (H) <=16.0 %   CBC and Auto Differential   Result Value Ref Range    WBC 13.6 (H) 4.4 - 11.3 x10*3/uL    nRBC 2.1 (H) 0.0 - 0.0 /100 WBCs    RBC 2.35 (L) 4.00 - 5.20 x10*6/uL    Hemoglobin 6.9 (L) 12.0 - 16.0 g/dL    Hematocrit 20.9 (L) 36.0 - 46.0 %    MCV 89 80 - 100 fL    MCH 29.4 26.0 - 34.0 pg    MCHC 33.0 32.0 - 36.0 g/dL    RDW 20.3 (H) 11.5 - 14.5 %    Platelets 109 (L) 150 - 450 x10*3/uL    Immature Granulocytes %, Automated 2.1 (H) 0.0 - 0.9 %    Immature Granulocytes Absolute, Automated 0.29 0.00 - 0.70 x10*3/uL   Lactate Dehydrogenase   Result Value Ref Range    LDH 1,136 (H) 84 - 246 U/L   Hepatic function panel   Result Value Ref Range    Albumin 3.7 3.4 - 5.0 g/dL    Bilirubin, Total 1.7 (H) 0.0 - 1.2 mg/dL    Bilirubin, Direct 0.9 (H) 0.0 - 0.3 mg/dL    Alkaline Phosphatase 197 (H) 33 - 110 U/L    ALT 16 7 - 45 U/L    AST 39 9 - 39 U/L    Total Protein 5.7 (L) 6.4 - 8.2 g/dL   TSH with reflex to Free T4 if  abnormal   Result Value Ref Range    Thyroid Stimulating Hormone 19.48 (H) 0.44 - 3.98 mIU/L   T3, free   Result Value Ref Range    Triiodothyronine, Free 0.8 (L) 2.3 - 4.2 pg/mL   ECMO VENOUS FULL PANEL   Result Value Ref Range    POCT pH, Venous ECMO 7.21 Reference range not established pH    POCT pCO2, Venous ECMO 63 Reference range not established mm Hg    POCT pO2,  Venous  ECMO 30 Reference range not established mm Hg    POCT SO2, Venous ECMO 54 Reference range not established %    POCT Oxy Hemoglobin, Venous ECMO 52.7 Reference range not established %    POCT Hematocrit Calculated, Venous ECMO 22.0 (L) 36.0 - 46.0 %    POCT Sodium, Venous ECMO 133 (L) 136 - 145 mmol/L    POCT Potassium, Venous ECMO 4.5 3.5 - 5.3 mmol/L    POCT Chloride, Venous ECMO 102 98 - 107 mmol/L    POCT Ionized Calcium, Venous ECMO 1.12 1.10 - 1.33 mmol/L    POCT Glucose, Venous ECMO 175 (H) 74 - 99 mg/dL    POCT Lactate Venous ECMO 0.8 0.4 - 2.0 mmol/L    POCT Base Excess, Venous ECMO -2.8 Reference range not established mmol/L    POCT HCO3 Calculated, Venous ECMO 25.2 Reference range not established mmol/L    POCT Hemoglobin, Venous ECMO 7.4 (L) 12.0 - 16.0 g/dL    POCT Anion Gap, Venous ECMO 10 Reference range not established mmo/L    Patient Temperature 37.0 degrees Celsius    FiO2 90 %   Thyroxine, Free   Result Value Ref Range    Thyroxine, Free 0.63 (L) 0.78 - 1.48 ng/dL   Manual Differential   Result Value Ref Range    Neutrophils %, Manual 91.4 40.0 - 80.0 %    Bands %, Manual 4.3 0.0 - 5.0 %    Lymphocytes %, Manual 3.4 13.0 - 44.0 %    Monocytes %, Manual 0.9 2.0 - 10.0 %    Eosinophils %, Manual 0.0 0.0 - 6.0 %    Basophils %, Manual 0.0 0.0 - 2.0 %    Seg Neutrophils Absolute, Manual 12.43 (H) 1.20 - 7.00 x10*3/uL    Bands Absolute, Manual 0.58 0.00 - 0.70 x10*3/uL    Lymphocytes Absolute, Manual 0.46 (L) 1.20 - 4.80 x10*3/uL    Monocytes Absolute, Manual 0.12 0.10 - 1.00 x10*3/uL    Eosinophils Absolute, Manual 0.00 0.00 -  0.70 x10*3/uL    Basophils Absolute, Manual 0.00 0.00 - 0.10 x10*3/uL    Total Cells Counted 116     Neutrophils Absolute, Manual 13.01 (H) 1.20 - 7.70 x10*3/uL    RBC Morphology See Below     Polychromasia Mild     Hypochromia Mild     RBC Fragments Few     Ovalocytes Few     Acanthocytes Few    Renal function panel   Result Value Ref Range    Glucose 173 (H) 74 - 99 mg/dL    Sodium 136 136 - 145 mmol/L    Potassium 4.4 3.5 - 5.3 mmol/L    Chloride 100 98 - 107 mmol/L    Bicarbonate 21 21 - 32 mmol/L    Anion Gap 19 10 - 20 mmol/L    Urea Nitrogen 15 6 - 23 mg/dL    Creatinine 0.82 0.50 - 1.05 mg/dL    eGFR >90 >60 mL/min/1.73m*2    Calcium 7.6 (L) 8.6 - 10.6 mg/dL    Phosphorus 2.7 2.5 - 4.9 mg/dL    Albumin 3.6 3.4 - 5.0 g/dL   Lipase   Result Value Ref Range    Lipase 9 9 - 82 U/L   ECMO ARTERIAL FULL PANEL   Result Value Ref Range    POCT pH, Arterial ECMO 7.22 Reference range not established pH    POCT pCO2, Arterial ECMO 61 Reference range not established mm Hg    POCT pO2,  Arterial ECMO 275 Reference range not established mm Hg    POCT SO2, Arterial ECMO 100 Reference range not established %    POCT Oxy Hemoglobin, Arterial ECMO 96.6 Reference range not established %    POCT Hematocrit Calculated, Arterial ECMO 23.0 (L) 36.0 - 46.0 %    POCT Sodium, Arterial  ECMO 131 (L) 136 - 145 mmol/L    POCT Potassium, Arterial  ECMO 4.4 3.5 - 5.3 mmol/L    POCT Chloride, Arterial  ECMO 101 98 - 107 mmol/L    POCT Ionized Calcium, Arterial  ECMO 1.10 1.10 - 1.33 mmol/L    POCT Glucose, Arterial  ECMO 181 (H) 74 - 99 mg/dL    POCT Lactate Arterial  ECMO 0.9 0.4 - 2.0 mmol/L    POCT Base Excess, Arterial ECMO -2.8 Reference range not established mmol/L    POCT HCO3 Calculated, Arterial ECMO 25.0 Reference range not established mmol/L    POCT Hemoglobin, Arterial  ECMO 7.7 (L) 12.0 - 16.0 g/dL    POCT Anion Gap, Arterial  ECMO 9 Reference range not established mmo/L    Patient Temperature 37.0 degrees Celsius    FiO2 90 %    ECMO VENOUS FULL PANEL   Result Value Ref Range    POCT pH, Venous ECMO 7.21 Reference range not established pH    POCT pCO2, Venous ECMO 59 Reference range not established mm Hg    POCT pO2,  Venous  ECMO 31 Reference range not established mm Hg    POCT SO2, Venous ECMO 57 Reference range not established %    POCT Oxy Hemoglobin, Venous ECMO 54.9 Reference range not established %    POCT Hematocrit Calculated, Venous ECMO 22.0 (L) 36.0 - 46.0 %    POCT Sodium, Venous ECMO 134 (L) 136 - 145 mmol/L    POCT Potassium, Venous ECMO 4.2 3.5 - 5.3 mmol/L    POCT Chloride, Venous ECMO 100 98 - 107 mmol/L    POCT Ionized Calcium, Venous ECMO 1.08 (L) 1.10 - 1.33 mmol/L    POCT Glucose, Venous ECMO 165 (H) 74 - 99 mg/dL    POCT Lactate Venous ECMO 0.7 0.4 - 2.0 mmol/L    POCT Base Excess, Venous ECMO -4.2 Reference range not established mmol/L    POCT HCO3 Calculated, Venous ECMO 23.6 Reference range not established mmol/L    POCT Hemoglobin, Venous ECMO 7.3 (L) 12.0 - 16.0 g/dL    POCT Anion Gap, Venous ECMO 15 Reference range not established mmo/L    Patient Temperature 37.0 degrees Celsius    FiO2 90 %     *Note: Due to a large number of results and/or encounters for the requested time period, some results have not been displayed. A complete set of results can be found in Results Review.       Malnutrition Diagnosis Status: Declining  Malnutrition Diagnosis: Moderate malnutrition related to acute disease or injury  As Evidenced by: pt's reported intake likely meeting <75% of estimated needs for at least 7 days, previous 5% weight loss in 1 month, LOS 42.  I agree with the dietitian's malnutrition diagnosis.      Assessment/Plan   Ms. Yimi Bowles is a 34F with a PMHx sig for stage D HFrEF (PPCM) s/p ICD s/p CardioMEMs, hypothyroidism 2/2 thyroidectomy, obesity s/p gastric bypass (2017), and SLE who is s/p OHT (3/31/2022) with a post-OHT course complicated by RIJ/RUE DVTs, leukopenia, left groin seroma, and asymptomatic  2R ACR (11/2022) treated with steroids, s/p total hysterectomy (2023), Flu A (1/2024).     Originally presented to [facility] ED on 2/15/24 with complaints of N/V x 3 days and inability to keep medications down. Found to have acute systolic heart failure with primary graft failure and cardiogenic shock. Treated for suspected acute cellular vs. acute antibody mediated rejection; however, multiple cardiac biopsies negative for pathology indicating rejection or other causes of graft failure. Course has been complicated by multiple MCS devices for refractory cardiogenic shock (right femoral IABP: 2/18/24 - 3/1/24, Left femoral VA ECMO: 2/19/24 - 2/29/24, Right axillary impella 5.5: 3/1/24 - remains in place, 2nd right femoral VA ECMO: 3/27/24 - remains in place), ARF requiring RRT, acute liver injury, intubation due to hypoxic respiratory failure, severe hemolysis 2/2 to impella malposition, mild CMV viremia, bilateral provoked IJ DVTs, multiple episodes of epistaxis & coagulopathies requiring ongoing blood product transfusions.       Transferred to CTICU on 3/27/24 following right femoral VA ECMO placement due to worsening cardiogenic shock and multi-organ failure despite Impella 5.5 support and multiple inotropes. Now awaiting suitable organ donation as UNOS status 1 for heart-kidney transplantation (listed 4/3/24).      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  #Acute renal failure requiring RRT   #Acute transaminitis  #Coagulopathy  #Malnutrition  #Thrombocytopenia   #Anemia   #Provoked bilateral IJ DVTs    #Left SFA occlusion   #Suspected pneumonia  #Delirium/Anxiety     Donor/Recipient Infectious history:  CMV: -/+ (low grade CMV viremia w/ levels < 35 on 3/1/24, repeat CMV levels remain negative since 3/27/24)  Toxo: -/-   Hep C: -/-     Rejection/Prophylaxis (transplants):  - Steroids: Continue 10mg PO prednisone daily  -  Tacrolimus: Increased to 4.0 mg PO @ 0630 and 3.5mg PO @ 1830 w/ daily levels drawn @ 0600  - Tacrolimus goal troughs: 8-10  - Myfortic acid: 360mg BID    - Antifungals: nystatin 500,000units Q6  - Antivirals: valcyte 450mg Q48hrs   - Anti PCP & Toxoplasmosis: holding Bactrim SS daily due to thrombocytopenia     Last cardiac biopsy: 2/16/24 with ACR grade 1R and no AMR (extremely low C4d+ detected), 2/20/24 negative for ACR and AMR  Last HLA: 4/2/24 negative for class 1 or 2 antibodies   Last RHC: closing numbers on 3/16/24 /86(97) RA 20, PAP 40/33(37), C.O./C.I. 5.5/2.8, PVR 88, SVR 1115   Last LHC: 2/18/24 negative for CAV and CAD   Last TTE/BOB: 3/27/24 shows LVEF 10-15% w/ global hypokinesis, severe RV dysfunction, mild-mod MR, trace TR and impella angled posteriorly   Osteopenia/osteoporosis prophylaxis: Vitamin D3 currently held. Continue calcium supplements  Peptic/gastric ulcer prophylaxis: Pantoprazole 40mg daily  CAV Prophylaxis: Holding Aspirin 81mg due to continued thrombocytopenia. Continue pravastatin daily.      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 Broad differential including SLE flair, giant cell vasculitis, CAV/CAD, viral cardiomyopathy, sarcoidosis, amyloidosis and allograft rejection amongst many others. 2xallograft biopsies on 2/16 & 2/20 remain negative for any significant pathology. Notably, both biopsies taken after rejection therapies implemented which may have reduced areas of graft damage.    - DSAs remain negative; however, patient may have non-HLA antibodies present. 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   - Negative CAV & CAD via LHC on 2/18/24   - Completed methylpred steroid pulse w/ 1g Q24 x 3 days (2/16-2/18) and prednisone taper   - Thymoglobulin doses: 2/18 & 2/19   - IVIG + PLEX Session: 2/18, 2/20, 3/7, 3/11 and 3/13    - Right femoral VA ECMO flowing 3.5L w/ FIO2 90% and sweep 0.5  - Right axillary impella for LV venting remains in place and set  P2 w/ flows of 1.2  - LFTs near normal s/p ECMO cannulation with improving hemolytic labwork s/p impella wean    - Improving sleep; however, intermittently delirious and anxious   - Chest x-ray with mild pulmonary edema   - Out of bed and standing daily   - Mildly hypotensive this morning with continued shacking. Patient at high risk for infection due to immunosuppression, invasive devices and deconditioning. Due to her immunosuppression she may not mount a robust immune response (lack of leukocytosis or fever), along with ECMO circuit masking fever due to cooling.      Transplant Status:  - Was listed Status 1 for combined heart-kidney (listed in UNOS on 4/2/24) --> on 4/6 changed listing to UNOS Status 7 for heart given development of new infection/sepsis, coagulopathy, encephalopathy/delirium, transplant coordinator will update kidney transplant team as well  - ABO status: O+  - CMV+/EBV+/Toxo-/Hep B-/Hep C-  - Prospective cross match with every donor offer  - Due to potential risk of hyperacute allograft rejection, induction plan will be 500mg solumedrol x 2 (followed by steroid taper) and thymoglobulin     Recommendations:  - Repeat pan-cultures with no growth to date, appreciate Transplant ID recommendations  - Given poor caloric intake, agree with consult to ENT for fiberoptic Dobhoff placement   - Continue tacrolimus 4.0mg @0630 and 3.5mg @1830  - Agree with holding heparin infusion in the setting of elevated INR of 2.9 today  - Agree with giving Vitamin K 10mg IV x3 days  - Would treat anemia conservatively, if hemodynamically stable with Hgb <7.0 then can hold off on further pRBC transfusions at this time. She is status post 2 units PRBCs on Friday.  - Agree with intermittent Precedex for significant anxiety and continuing seroquel for delirium    Continue excellent care per CTICU team.      Patient was examined and discussed with Advanced Heart Failure and Transplant Cardiology attending physician,   Pretty Toussaint.    Signed,  Gwendolyn Del Valle MD  Heart Failure Fellow PGY4

## 2024-04-09 NOTE — PROGRESS NOTES
CTICU Progress Note  Charla Bowles/[id number]    Admit Date: 2/15/2024  Hospital Length of Stay: 54   ICU Length of Stay: 12d 12h   CT SURGEON: Dr. Witt    SUBJECTIVE:   No acute events overnight.   Precedex again last night for panic attacks / sleep. Remains intermittently delirious. A&O early this AM.  LDH 1451 with Impella P3 and flows 0.2-1.0 LPM.  ECMO flows 2.8 LPM.  Pulse pressure about 10mmHg, MAP 90mmHg.      MEDICATIONS  Infusions:  dexmedeTOMIDine, Last Rate: Stopped (04/09/24 0500)  [Held by provider] heparin, Last Rate: 500 Units/hr (04/06/24 0800)  lactated Ringer's, Last Rate: 5 mL/hr (04/08/24 2000)  PrismaSol 4/2.5, Last Rate: 2,400 mL/hr (04/09/24 0731)  sodium bicarbonate infusion Impella Purge 25 mEq/1000 mL D5W, Last Rate: 10 mL/hr (04/08/24 2000)      Scheduled:  acetaminophen, 650 mg, q6h  bisacodyl, 10 mg, Once  calcium carbonate-vitamin D3, 1 tablet, Daily  cyanocobalamin, 500 mcg, Daily  darbepoetin floyd, 100 mcg, q14 days  ferrous sulfate (325 mg ferrous sulfate), 65 mg of iron, Daily with breakfast  folic acid, 1 mg, Daily  insulin lispro, 0-5 Units, TID with meals  levothyroxine, 200 mcg, Daily  lidocaine, 1 patch, Daily  melatonin, 10 mg, Daily  meropenem, 2 g, q12h  methocarbamol, 500 mg, q6h TRICIA  micafungin, 100 mg, q24h  multivitamin with minerals, 1 tablet, Daily  mycophenolate, 360 mg, BID  nystatin, 5 mL, q6h  oxygen, , Continuous - Inhalation  pantoprazole, 40 mg, Daily  phytonadione, 10 mg, Daily  predniSONE, 10 mg, Daily  psyllium, 1 packet, Daily  QUEtiapine, 50 mg, Nightly  rosuvastatin, 10 mg, Nightly  sennosides-docusate sodium, 1 tablet, BID  sod phos di, mono-K phos mono, 500 mg, 4x daily  sodium chloride, 1 spray, 4x daily  sulfamethoxazole-trimethoprim, 80 mg of trimethoprim, Daily  tacrolimus, 3.5 mg, q24h  tacrolimus, 4 mg, q24h  valGANciclovir, 450 mg, q48h  vancomycin, 1,000 mg, q12h      PRN:  alteplase, 2 mg, PRN  bisacodyl, 10 mg, Daily PRN  calcium  "gluconate, 1 g, q6h PRN  calcium gluconate, 2 g, q6h PRN  dextrose, 25 g, q15 min PRN  dextrose, 25 g, q15 min PRN   Or  glucagon, 1 mg, q15 min PRN  hydrALAZINE, 5 mg, q4h PRN  hydrOXYzine HCL, 25 mg, q6h PRN  ipratropium-albuteroL, 3 mL, q6h PRN  artificial tears, 2 drop, PRN  magnesium sulfate, 2 g, q6h PRN  magnesium sulfate, 4 g, q6h PRN  microfibrllar collagen, , PRN  mupirocin, , BID PRN  naloxone, 0.2 mg, q5 min PRN  ondansetron, 4 mg, q4h PRN  oxyCODONE, 5 mg, q6h PRN  oxygen, , Continuous PRN - O2/gases  sodium chloride, 1 spray, 4x daily PRN  vancomycin, , Daily PRN        PHYSICAL EXAM:   Visit Vitals  BP 95/82   Pulse 108   Temp 36.7 °C (98.1 °F) (Tympanic)   Resp (!) 32   Ht 1.549 m (5' 0.98\")   Wt 84 kg (185 lb 3 oz)   SpO2 98%   BMI 35.01 kg/m²   OB Status Hysterectomy   Smoking Status Never   BSA 1.9 m²     Wt Readings from Last 5 Encounters:   04/06/24 84 kg (185 lb 3 oz)   12/07/23 92.1 kg (203 lb)   12/01/23 93 kg (205 lb)   11/29/23 92.9 kg (204 lb 12.8 oz)   11/09/23 91.3 kg (201 lb 3.2 oz)     INTAKE/OUTPUT:  I/O last 3 completed shifts:  In: 2907.5 (34.6 mL/kg) [P.O.:660; I.V.:417.5 (5 mL/kg); NG/GT:480; IV Piggyback:1350]  Out: 4676 (55.7 mL/kg) [Other:4676]  Weight: 84 kg        LDA:  ECMO Cannulation Peripheral Right;Femoral artery;Femoral vein (Active)   Placement Date/Time: 03/27/24 1700   ECMO Type: Peripheral  Cannulation Site: Right;Femoral artery;Femoral vein  Line Securement: Line secured in 2 locations;Securement device;Sutures   Number of days: 10       Arterial Line 03/27/24 Right Radial (Active)   Placement Date/Time: 03/27/24 1545   Orientation: Right  Location: Radial   Number of days: 10       Ventricular Assist Device Impella 5.5 (Active)   Placement Date/Time: 03/01/24 1956   Placed by: Dr Viramontes  Hand Hygiene Completed: Yes  Site Location: Axilla right  VAD Type: Left ventricular assist device  VAD Brand: Impella 5.5   Number of days: 36       Hemodialysis Cath 04/06/24 " Triple lumen Right Non-tunneled catheter Jugular (Active)   Placement Date/Time: 04/06/24 1500   Hand Hygiene Completed: Yes  Site Prep: Usual sterile procedure followed  Site Prep Agent has Completely Dried Before Insertion: Yes  All 5 Sterile Barriers Used (Gloves, Gown, Cap, Mask, Large Sterile Drape): Yes ...   Number of days: 0     Physical Exam:   - CONSTITUTION: Critically ill on MCS  - NEUROLOGIC: A+O vs CAM positive, following in all 4 extremities  - CARDIOVASCULAR: ST, R fem VA ECMO, no bleeding at site. R axillary impella, no bleeding at site. No s/s infection at either site. +distal signals in bilateral lower extremities  - RESPIRATORY: Coarse, diminished bilateral lung sounds, symmetric chest expansion  - GI: Abdomen soft, non tender, non distended, +bowel sounds  - : Anuric, on CVVH via ECMO circuit  - EXTREMITIES: Warm and dry, no peripheral edema  - SKIN: Left femoral skin breakdown with dressing in place  - PSYCHIATRIC: Calm vs anxious    Images: CXR c/f worsening pulmonary edema    Invasive Hemodynamics:    Most Recent Range Past 24hrs   BP (Art) 92/84 Arterial Line BP 1  Min: 79/75  Max: 103/86   MAP(Art) 88 mmHg Arterial Line MAP 1 (mmHg)   Min: 75 mmHg  Max: 95 mmHg   RA/CVP   No data recorded   PA 41/34 No data recorded   PA(mean) 37 mmHg No data recorded   CO 5.5 L/min No data recorded   CI 2.8 L/min/m2 No data recorded   Mixed Venous 57.7 % SVO2 (%)  Min: 52.7 %  Max: 66.4 %   SVR  1115 (dyne*sec)/cm5 No data recorded     ECMO:     Most Recent Range Past 24hrs   Flow 2.84 Patient Flow (L/min)  Min: 2.81  Max: 3.01   Speed 3191 Circuit Flow (RPM)  Min: 3189  Max: 9191   Sweep 3 Sweep Gas Flow Rate (L/min)  Min: 1  Max: 3      Impella:      Most Recent Range Past 24hrs   Performance Level 3 P Level  Min: 3   Min taken time: 04/09/24 0800  Max: 3   Max taken time: 04/09/24 0800   Flow (L/min) 0.6 Flow (L/min)  Min: 0.5   Min taken time: 04/09/24 0100  Max: 1.3   Max taken time: 04/08/24 1500    Motor Current 155/116 Motor Current  Min: 151/115   Min taken time: 04/08/24 2200  Max: 176/115   Max taken time: 04/08/24 1000   Placement Signal No  Placement OK could not be evaluated. This SmartLink does not work with rows of the type: Custom List   Purge (mmHg) 492 Purge Pressure (mmHg)  Min: 407   Min taken time: 04/09/24 0100  Max: 597   Max taken time: 04/09/24 0500   Purge rate (mL/hr) 11.3 Purge Rate (mL/hr)  Min: 11.2   Min taken time: 04/08/24 1200  Max: 11.7   Max taken time: 04/08/24 1400        Daily Risk Screen:  Dialysis/Hemapheresis    Assessment/Plan   34F with a PMHx sig for stage D HFrEF (PPCM) s/p ICD s/p CardioMEMs, hypothyroidism 2/2 thyroidectomy, obesity s/p gastric bypass (2017), and SLE who is s/p OHT (3/31/2022) with a post-OHT course complicated by RIJ/RUE DVTs, leukopenia, left groin seroma, and asymptomatic 2R ACR (11/2022) treated with steroids, s/p total hysterectomy (2023), Flu A (1/2024).     She presented to [facility] ED on 2/15/24 with complaints of N/V x 3 days and inability to keep medications down. Found to have acute systolic heart failure with primary graft failure and cardiogenic shock. Treated for suspected acute cellular vs. acute antibody mediated rejection; however, multiple cardiac biopsies negative for signs of rejection or other causes of graft failure. Her course has been complicated by multiple MCS devices for refractory cardiogenic shock (right femoral IABP: 2/18/24 - 3/1/24, Left femoral VA ECMO: 2/19/24 - 2/29/24, Right axillary impella 5.5: 3/1/24 - remains in place, 2nd right femoral VA ECMO: 3/27/24 - remains in place), ARF requiring RRT, acute liver injury, intubation due to volume overload in setting of pulmonary edema, severe hemolysis 2/2 to impella malposition, mild CMV viremia, bilateral provoked IJ DVTs, multiple episodes of epistaxis & coagulopathies requiring ongoing blood product transfusions.        Charla was transferred to CTICU on 3/27/24  following right femoral VA ECMO placement due to worsening cardiogenic shock and multi-organ failure despite Impella 5.5 support and multiple inotropes. She was listed Status 1 for heart/kidney on 4/2, however was deactivated (status 7) due to c/f sepsis.  She remains critically ill in the CTICU on MCS with ECMO and an Impella as well as CVVH.       NEURO: She vacillates between being  neuro intact with intermittent delirium and anxiety.  Insomnia supported with multimodals and REST protocol.  Cam positive/negative this AM. She sat on edge of bed with PT 4/8 and stood at side of bed last week. -->  - Serial neuro and pain assessments  - Continue tylenol scheduled  - Continue lidoderm patch for back pain  - PRN oxy 5mg Q6   - Continue melatonin 10 mg HS   - Continue Seroquel 50 mg HS (Qtc 399 today)     - May use precedex at bedtime for sleep  - PRN hydroxyzine  - PT/OT Consult; mobilize as able  - CAM ICU score qshift. Sleep/wake cycle hygiene  - REST protocol     ENT: Multiple episodes of epistaxis with interventions by ENT. Most recently in OR 3/27 with L nare packed. Packing removed 4/1. -->  - appreciate ENT recs  - PRN ocean spray and mupiricin for dry nasal passages  - PRN afrin      Cardiac: Patient has a history of heart transplant in March of 2022 with primary graft dysfunction treated for acute cellular and antibody rejection without improvement with negative biopsy with multiple MCS devices for refractory cardiogenic shock (right femoral IABP: 2/18/24 - 3/1/24; Left femoral VA ECMO: 2/19/24 - 2/29/24; Right axillary impella 5.5: 3/1/24 - ; 2nd right femoral VA ECMO: 3/27/24 - ). Elevated LDH and difficulty with flows despite multiple attempts at repositioning Impella previously.  ECMO flows appeared to have been weaned over the last few days with hopes of decreasing pulmonary edema.  Current ECMO settings RPMs 3190 flows ~2.8L with FiO2 90% and sweep 3; Impella 5.5 P3 with flows ~0.3-1.0 LPM.  ST 1o0s,  MAPs 65-70s while asleep and 90's awake today. -->  - Maintain goal MAP 70-90  - Continue full BiV support with ECMO, increase flows to 3 LPM  - Continue Impella at P3, for LV Venting, discuss removal with multidisciplinary team in setting of inadequate positioning, low flows, c/f infection of unknown origin  - Continue rosuvastatin dose 10mg daily for impaired renal excretion  - Trend daily LDH   - Hold home amlodipine     Vascular: 3/27 arterial duplex with proximal SFA occlusion with collateral flow. C/f LLE ischemia (previous ECMO site) with loss of pulses- now monophasic with CK that had been normal and no longer trending. Feet warm with intact motor exam. -->  - appreciate vascular surgery following  - Vascular Surgery following for L SFA- No intervention needed at this time     PULM: She has been intubated multiple times throughout hospital course for procedures. Acute respiratory failure persisting due to acute pulmonary edema had been improving, now increasing pulmonary congestion. Gas exchange augmented by VA ECMO, occasionally on NC for comfort.  -->  - CXR daily  - Adjust sweep for pH 7.3 - 7.5  - Maintain SPO2 > 92%     GI: History of gastric bypass and MMF colitis. Shock liver originally resolved; most recently elevated r/t likely congestive hepatopathy that is improving on ECMO. Abdominal pain improved with reduced myfortic dosing. Previous c/f GI bleed, likely blood from epistaxis; no current evidence of GI bleeding. H pylori negative, fecal calprotectin (elevated at 380), fecal lactoferrin (Positive 03/23/24). Last BM 4/9. Poor nutritional intake.  Dobhoff placed by ENT 4/8 under fiberoptic visualization.  >  - Continue calcitriol 0.5mg daily, multivitamin, calcium carbonate 1,250mg BID  - Continue PPI daily  - Regular diet with TF, advance to goal  - Continue Ensure Clear TID and magic cup  - Continue miralax BID & senna-colace BID     :  No history, baseline Cr 1.2-1.3. HIRAM originally on CVVH  then with renal recovery but then again worsened and now dialysis dependent and failed diuretic challenges. Hypervolemia improved. Persisting hypophosphatemia despite repeated supplementation. -->  - RFP as clinically indicated, replete electrolytes per CTICU protocol.   - Continue CVVH with goal removal -500 to 1000 pending CVP goal 8-10   - Continue Sodium Phos 500 mg 4x daily  - Nephrology Following     ENDO: History of hypothyroidism and prediabetes. TSH elevated originally to malabsorption then with dosing adjusted by endo; TSH (3/17): 20.74, T4 1.11, T3: 1.4, improved 4/1; TSH 10.13, T4 0.70. 4/8: TSH 19.48, T3 0.8, T4 0.68. Steroid induced hyperglycemia acceptable glycemic control on SSI. -->  - Maintain BG <180 with hypoglycemia protocol  - Continue SSI #1 AC/HS   - Continue Synthroid 200mcg daily.   - Appreciate Endocrine Consult      HEME: History of iron deficiency anemia and remote DVT; again with acute DVT LIJ and SVT left cephalic vein and right cephalic vein. Acute blood loss anemia with repeated transfusions with significant hemolysis but no evidence of active bleeding; last received RBC 4/5. Stable thrombocytopenia persisting; last PF4 negative 3/30. No epistaxis today. Received 2 U PRBC 4/5. Coagulopathic with increasing INR but stable LFTs, 2.9 today. Possibly 2/2 malnutrition. Liver ultrasound 4/6 unconcerning. -->  - Continue ferrous sulfate daily  - Avoid unnecessarily transfusing, HGB > 7.0  - Vitamin K x 3 doses (4/8 - )   - Holding ASA for CAD with thrombocytopenia  - Hold systemic heparin with elevated INR, will resume heparin purge in the Impella today.  Trend coags daily  - SCDs, SQH for DVT prophylaxis  - Aranesp every 2 weeks  - Last type and screen: 4/9     Rejection/prophylaxis: S/p heart transplant 3/31/2022. Induction basiliximab. Donor HCV -, toxo -/-, CMV -/+. Mild ACR with +CD4 and negative HLAs however clinical presentation concern for acute cellular vs AMR rejection/stuttering  rejection completed courses of thymo/PLEX/IVIG without clinical improvement.  With consideration to progressive graft failure and concern for infection limiting re-transplant at this time, we will hold tacro and myfortic today (4/9 - ).  - Steroids: Continue PO prednisone 10 mg daily  - Hold Tacrolimus: 4.0 mg AM/3.5 mg PM w/ daily levels drawn @ 0600  - Hold Tacrolimus goal troughs: 8-10  - Hold Myfortic acid: 360mg BID.  (Not starting MMF d/t hx of colitis)  - Antifungals: nystatin 500,000units Q6  - Antivirals: valcyte 450mg Q48hrs  - Anti PCP & Toxoplasmosis: continue SS Bactrim with c/f PNA     ID: Afebrile. C/f previous yeast infection. BC 3/27 NGTD final. MRSA screen negative 3/28. Episode of hypotension on 4/1, pan cultured and empiric Vanco/Zosyn started.  Patient was shivering 4/3, concern for risk of infection. Blood cultures sent 4/3, NGTD. Zosyn (4/1- 4/7) then broadened to Susan (4-7 - ) and natalie started (4/7 - ).  -->  - Trend temp q4h  - Continue Susan and Natalie (4/7 - )  - Continue Vanco (4/6 - )  - Follow up cxs  - Send sputum culture   - Send stool for c-dif toxin   - Straight cath bladder for UA with reflex culture   - ID consulted, requested labs sent (CMV PCR, RVP, COVID/flu (negative), serum beta b glucan, toxo PCR, crypto antigen, urine histo antigen)  - CT chest/abd/pelvis today     Skin: Left groin wound from previous ECMO with wound consulted. Wound cx with NG 3/15.   - Preventative Mepilex dressings in place on sacrum and heels  - Change preventative Mepilex weekly or more frequently as indicated (when moist/soiled)   - Every shift skin assessment per nursing and weekly ICU skin rounds  - Moisture barrier to be applied with christopher care  - Active skin problems addressed with nursing on daily rounds  - wound recs from note 3/15 ordered      Proph:  SCDs  SQH while holding systemic heparin  PPI     G: Lines  RIJ Trialysis - 4/6  R radial maxwell 3/27  PIV x2    Restraints: Not indicated    Code  status: Full Code    I personally spent 55 minutes of critical care time directly and personally managing the patient exclusive of separately billable procedures.     A,B,C,D,E,F,G: reviewed.    A&P reviewed on multidisciplinary critical care rounds      Dispo: CTICU care for now.    CTICU TEAM PHONE [phone]

## 2024-04-09 NOTE — PROGRESS NOTES
"  Last Recorded Vitals  Blood pressure 94/83, pulse 99, temperature 36.3 °C (97.3 °F), temperature source Temporal, resp. rate (!) 47, height 1.549 m (5' 0.98\"), weight 84 kg (185 lb 3 oz), SpO2 100 %.  Intake/Output last 3 Shifts:  I/O last 3 completed shifts:  In: 2907.5 (34.6 mL/kg) [P.O.:660; I.V.:417.5 (5 mL/kg); NG/GT:480; IV Piggyback:1350]  Out: 4676 (55.7 mL/kg) [Other:4676]  Weight: 84 kg     Relevant Results                    Malnutrition Diagnosis Status: Declining  Malnutrition Diagnosis: Moderate malnutrition related to acute disease or injury  As Evidenced by: pt's reported intake likely meeting <75% of estimated needs for at least 7 days, previous 5% weight loss in 1 month, LOS 42.  I agree with the dietitian's malnutrition diagnosis.      Assessment/Plan   Principal Problem:    Cardiogenic shock (CMS/HCC)  Active Problems:    Heart transplant recipient (CMS/HCC)    ESRD (end stage renal disease) on dialysis (CMS/HCC)    Immunosuppression (CMS/HCC)    HIRAM (acute kidney injury) (CMS/HCC)    Acute passive congestion of liver    Hyponatremia    Iron deficiency anemia    Abdominal pain    Systolic congestive heart failure (CMS/HCC)    History of left ventricular assist device (CMS/HCC)    History of extracorporeal membrane oxygenation treatment    Moderate protein-calorie malnutrition (CMS/HCC)    Heart transplant as cause of abnormal reaction or later complication (CMS/HCC)    Cardiac transplant rejection (CMS/HCC)    Acute combined systolic (congestive) and diastolic (congestive) heart failure (CMS/HCC)    Patient requires ECMO support. Drainage cannula site, cannula, pump, oxygenator, return cannula and return cannula site clinically inspected. RPM and sweep reviewed and adjusted appropriate to clinical context. Anticoagulation status and intensity discussed. Plan for weaning, next clinical steps discussed with team and family. Discussed with cardiothoracic surgeon, perfusion, palliative care and " physical/occupational therapy    ECMO Indication: Cardiogenic shock, cardiac transplant rejection  ECMO Cannula Configuration: right femoral arterial - right femoral venous  ECMO circuit run from drainage cannula to pump to oxygenator to return cannula: Yes  Presence of cannula bleeding: None  Presence of oxygenator clot: Minimal  Pre/post PaO2 adequate: Adequate  LV Unloading/Pulse Pressure: Right axillary Impella 5.5 at P3, 0.8 L flow, 10 mmHg pulse pressure  Distal Perfusion: In place  Anticoagulation Plan/Monitoring: Starting SQH and heparin through Impella purge today. ACTs  Mobility Plan/Candidacy: OOB, PT/OT today  Path to decannulation/anticipated decannulation date: Currently status 7 on transplant list for heart/kidney     ECMO:     Most Recent Range Past 24hrs   Flow 3 Patient Flow (L/min)  Min: 2.79  Max: 3.21   Speed 3400 Circuit Flow (RPM)  Min: 3189  Max: 3400   Sweep 3 Sweep Gas Flow Rate (L/min)  Min: 1  Max: 3       dexmedeTOMIDine, 0.1-1.5 mcg/kg/hr, Last Rate: Stopped (04/09/24 1200)  heparin Impella Purge 25 units/mL in 500 mL D5W, 10 mL/hr, Last Rate: 10 mL/hr (04/09/24 1500)  lactated Ringer's, 5 mL/hr, Last Rate: 5 mL/hr (04/08/24 2000)  PrismaSol 4/2.5, 2,400 mL/hr, Last Rate: 2,400 mL/hr (04/09/24 1535)  sodium bicarbonate infusion Impella Purge 25 mEq/1000 mL D5W, 10 mL/hr, Last Rate: Stopped (04/09/24 1200)           Please see daily progress note for full assessment and plan.      I, as the attending critical care physician, have personally reviewed the recent patient history, physical exam, vital signs, significant labs, medications, imaging, and ECMO settings/flows of this critically ill patient. Using this information I will continue to actively manage this critically ill patient's extracorporeal membrane oxygenation (ECMO)/extracorporeal life support (ECLS) as documented in the assessment and plan above.          I spent 68 minutes in the professional and overall care of this  patient.      Karley Dhillon MD

## 2024-04-09 NOTE — PROGRESS NOTES
Charla Bowles is a 34 y.o. female on day 54 of admission presenting with Cardiogenic shock (CMS/HCC).    Subjective   Interval History:  -More awake/alert today  -Reports feeling predominantly lower back pain and R axillary discomfort  -Notes some cough without much production  -Abdominal pain seems improved    Objective   Range of Vitals (last 24 hours)  Heart Rate:  []   Temp:  [36.1 °C (97 °F)-36.7 °C (98.1 °F)]   Resp:  [21-63]   BP: ()/(82-92)   SpO2:  [53 %-100 %]   Daily Weight  04/06/24 : 84 kg (185 lb 3 oz)    Body mass index is 35.01 kg/m².    Physical Exam  GEN: More awake today, acutely ill-appearing.  HEENT: EOMI, anicteric sclera. No oral lesions appreciated, though limited exam  CV: Mechanical pump sounds, tachycardic.   RESP: on NC, tachypneic  ABD: Soft, little to no TTP in epigastrium today  EXT: Edema present bilaterally, WWP. Groin line site appear c/d/I; no evidence of acute infections  SKIN: No skin rash    Relevant Results  Labs  Results from last 72 hours   Lab Units 04/09/24  1229 04/09/24  0423 04/08/24  1412 04/08/24  0544 04/07/24  1235 04/07/24  0531   WBC AUTO x10*3/uL 13.0* 14.3* 17.5* 13.6*   < > 11.1   HEMOGLOBIN g/dL 7.8* 6.4* 7.1* 6.9*   < > 7.7*   HEMATOCRIT % 23.9* 19.7* 21.6* 20.9*   < > 23.1*   PLATELETS AUTO x10*3/uL 122* 97* 138* 109*   < > 106*   LYMPHO PCT MAN %  --  1.7  --  3.4  --  0.0   MONO PCT MAN %  --  3.4  --  0.9  --  1.7   EOSINO PCT MAN %  --  0.8  --  0.0  --  0.9    < > = values in this interval not displayed.     Results from last 72 hours   Lab Units 04/09/24  0423 04/08/24  1412 04/08/24  0544   SODIUM mmol/L 137 135* 136   POTASSIUM mmol/L 4.2 4.5 4.4   CHLORIDE mmol/L 98 99 100   CO2 mmol/L 23 24 21   BUN mg/dL 15 15 15   CREATININE mg/dL 0.73 0.74 0.82   GLUCOSE mg/dL 168* 157* 173*   CALCIUM mg/dL 7.9* 7.6* 7.6*   ANION GAP mmol/L 20 17 19   EGFR mL/min/1.73m*2 >90 >90 >90   PHOSPHORUS mg/dL 3.2 3.0 2.7     Results from last 72 hours    Lab Units 04/09/24  0423 04/08/24  1412 04/08/24  0544 04/07/24  1235 04/07/24  0531   ALK PHOS U/L 280*  --  197*  --  151*   BILIRUBIN TOTAL mg/dL 2.0*  --  1.7*  --  1.7*   BILIRUBIN DIRECT mg/dL 1.2*  --  0.9*  --  0.9*   PROTEIN TOTAL g/dL 5.9*  --  5.7*  --  5.3*   ALT U/L 22  --  16  --  15   AST U/L 59*  --  39  --  46*   ALBUMIN g/dL 3.8 3.9 3.6  3.7   < > 3.5    < > = values in this interval not displayed.     Estimated Creatinine Clearance: 107.8 mL/min (by C-G formula based on SCr of 0.73 mg/dL).  C-Reactive Protein   Date Value Ref Range Status   03/27/2024 4.27 (H) <1.00 mg/dL Final   02/15/2024 6.06 (H) <1.00 mg/dL Final     CRP   Date Value Ref Range Status   01/18/2023 0.68 mg/dL Final     Comment:     REF VALUE  < 1.00         Microbiology  Bacterial  -03/27 Bcx: NG  -04/01 Rcx: Throat araceli  -04/01 BCX: NG  -04/03 BCX: NG  -04/07 BCX: NG     Viral  -03/27 CMV PCR: not detected  -04/08 COVID, Flu: not detected  -04/08 EBV: negative    Fungal:  -04/08 BDG: pending  -04/08 Crypto Ag: negative  -04/08 Histo Ag: pending       Current IS:  -Tacro 3.5/4  -Pred 10     Imaging  Reviewed in Epic, including pending CT C/AP    Assessment/Plan   34yF with SLE; s/p hysterectomy; and HFrEF s/p ICD 2/2 PPCM s/p 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 (CMV -/+, EBV+/+, Toxo -/-, Hep C -/-) c/b prior ACR 11/2022 treated with steroids. She presented to the ED with N/V in 2/2024 and was found with graft failure and cardiogenic shock. Treated for acute cellular versus antibody-mediated rejection with pulse steroids, IVIG/plasmapheresis x5, and 2 doses of thymoglobulin in 2-3/2024, but repeated biopsies were negative for significant rejection. Over this time she has had refractory cardiogenic shock requiring impella and ECMO support with course c/b ARF requiring RRT, ALI, hemolysis in setting of impella positioning, bilateral internal jugular DVTs, and coagulopathies. She has had a mild CMV viremia for which she remains on valgan; Bactrim  for PJP/Toxo, though this has been held since ~2/22 due to thombocytopenias. We were consulted for HoTN, leukocytosis, and AMS. Localizing symptoms for us have largely been epigastric abdominal pain, dry cough, and mild AMS.      Nosocomial infection seems to be the most likely, but will consider OIs as well given her recent IS history. WBC and mentation today seems to be slowly improving, but will see how these trend. Review of her CT C/A/P (read pending) is notable for bilateral consolidative/GGO process with pulmonary edema, as well as some fluid levels/maybe mild dilation of the bowels with residual contrast (will see if oral contrast used with this CT or if residual). Consideration of other etiologies as well (drug toxicities, etc) should also be pursued.     Recommendations:  -Please send a serum galactomannan with next lab draw  -Will follow-up sputum tests if possible to obtain (Bacterial, fungal, AFB cultures; PJP PCR). Depending on course, may need to consider bronch if stability allows, but hopefully non-invasive work-up will be helpful  -Follow-up C diff   -Will follow-up final CT chest/A/P read  -Will follow-up pending serologic and urine tests  -Continue current vancomycin, meropenem, and micafungin for now  -Prophylaxis: Bactrim, valgan 450 q48h     Seen with fellow Chris Wong MD. We will continue to follow. Please contact ID Team A (fellow Chris Wong MD or myself) via [employer] or [phone].     Jovany Nino MD

## 2024-04-09 NOTE — PROGRESS NOTES
"Charla Bowles is a 34 y.o. female on day 54 of admission presenting with Cardiogenic shock (CMS/HCC).    Subjective   Slept reasonably well with precedex. Anxious today when it was weaned off this morning.        Objective     Physical Exam    Last Recorded Vitals  Blood pressure 94/83, pulse 99, temperature 36.3 °C (97.3 °F), temperature source Temporal, resp. rate (!) 47, height 1.549 m (5' 0.98\"), weight 84 kg (185 lb 3 oz), SpO2 100 %.  Intake/Output last 3 Shifts:  I/O last 3 completed shifts:  In: 2907.5 (34.6 mL/kg) [P.O.:660; I.V.:417.5 (5 mL/kg); NG/GT:480; IV Piggyback:1350]  Out: 4676 (55.7 mL/kg) [Other:4676]  Weight: 84 kg     Relevant Results                        Malnutrition Diagnosis Status: Declining  Malnutrition Diagnosis: Moderate malnutrition related to acute disease or injury  As Evidenced by: pt's reported intake likely meeting <75% of estimated needs for at least 7 days, previous 5% weight loss in 1 month, LOS 42.  I agree with the dietitian's malnutrition diagnosis.      Assessment/Plan   Principal Problem:    Cardiogenic shock (CMS/HCC)  Active Problems:    Heart transplant recipient (CMS/HCC)    ESRD (end stage renal disease) on dialysis (CMS/HCC)    Immunosuppression (CMS/HCC)    HIRAM (acute kidney injury) (CMS/HCC)    Acute passive congestion of liver    Hyponatremia    Iron deficiency anemia    Abdominal pain    Systolic congestive heart failure (CMS/HCC)    History of left ventricular assist device (CMS/HCC)    History of extracorporeal membrane oxygenation treatment    Moderate protein-calorie malnutrition (CMS/HCC)    Heart transplant as cause of abnormal reaction or later complication (CMS/HCC)    Cardiac transplant rejection (CMS/HCC)    Acute combined systolic (congestive) and diastolic (congestive) heart failure (CMS/HCC)    Patient seen, evaluated, and discussed with the NILA.  I have personally obtained key components of the history and physical and have performed my " own medical decision making.      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 Patient re-listed for heart transplant and kidney transplant in setting of cardiogenic shock and acute renal failure; however, team made decision to move her to status 7 given concern for infection.      Neuro: More awake today and less confused, oriented x3 and conversational on rounds but continues to be intermittently t and CAM+. Continue PT/OT, out of bed. Pain control. Encourage sleep and REST protocol. Will continue seroquel, melatonin at night. Dexmedetomidine at night to encourage sleep--better sleep last night. Adjunct therapies if wanted to normalize environment. Will discuss music, art, and pet therapy to keep her engaged during the day.   CV: Heart transplant, complicated by rejection now with cardiogenic shock requiring ECMO and Impella. Continue full support on ECMO, Impella to P2 from P3 this morning given increasing LDH. ECMO flows 2.7 L and currently not anticoagulated, will increase flows to 3.1L 3400 RPM and add heparin through Impella purge. Impella flows low 0.6-0.8 L. Will discuss with multidisciplinary team options of repositioning versus removal as likely not significantly offloading LV.   Vascular: Vascular Surgery following, appreciate recs.   Pulm: Continued tachypnea requiring nasal cannula today. ECMO sweep increased for respiratory acidosis. CT scan today concerning for pneumonia, L > R. Will attempt to induce a sputum sample for culture. Increase bronchial hygiene as poor IS effort.    : Acute kidney injury, currently on CRRT - continue. Appreciate Renal consult. Optimize electrolytes. Goal even to negative if tolerated with goal CVP 10 (currently 12).  GI: Diet, bowel regimen. Continue PPI. Poor appetite and  intake, starting TF today for supplementation.   Heme: History of DVT with acute thromboses, including clots visualized in bilateral Ijs; US consistent with prior findings. Coagulopathy improved today after vitamin K, supporting nutritional coagulopathy. No current signs of bleeding. Start SQH and heparin through Impella purge today. Consider transition to systemic heparin tomorrow if remains stable tonight on purge dosing. Maintain active type and screen.   ID: Improved leukocytosis today. Send sputum culture, urine culture, and c diff given negative blood cultures to date. Continue coverage with meropenem with vancomycin, and micafungin; prophylactic Bactrim. ID following. Formal results of CT chest, abdomen, and pelvis pending for infectious source. Continue to follow read. Stopping myfortic and tacrolimus today after multidisciplinary discussion.  Endo: Continue levothyroxine. SSI for glucose control.   Immunosuppression: Continue steroids, prophylaxis per transplant.      Lines: No PIV access currently. PICC team unable to visualize targets due to cephalic clots and infiltration edema. RIJ trialysis catheter placed 4/6.       Dispo: Continue CTICU care. Discussed with HF attending Dr. Toussaint, Dr. Witt, and Dr. Marina on rounds. Patient and family updated at bedside.     This critically ill patient continues to be at risk for clinically significant deterioration / failure due to the above mentioned dysfunctional, unstable organ systems.  I have personally identified and managed all complex critical care issues to prevent aforementioned clinical deterioration.  Critical care time is spent at bedside and/or the immediate area and has included, but is not limited to, the review of diagnostic tests, labs, radiographs, serial assessments of hemodynamics, respiratory status, ventilatory management, review of consult team recommendations, and family updates.  Time spent in procedures and teaching are reported  separately. Critical Care Time: 68 minutes.        I spent 68 minutes in the professional and overall care of this patient.      Karley Dhillon MD

## 2024-04-09 NOTE — PROGRESS NOTES
"Charla Bowles is a 34 y.o. female on day 54 of admission presenting with Cardiogenic shock (CMS/HCC).    Subjective   Patient was seen at bedside.  She is sedated therefore I was unable to interview her.  Tube feeds are running at 30 cc/h.  Objective   Constitutional: Young -American female, alert and oriented x 0 secondary to sedation  Skin/Hair: Dry skin.  HEENT: EOMI, Anicteric scleras.  NG tube in place  Neck: Right IJ in place  Cardiovascular: Tachycardic  Respiratory: no increased wob,  or accessory muscle use.  Neuro: Could not elicit reflexes  Psych : Sedated  Last Recorded Vitals  Blood pressure 94/83, pulse 104, temperature 36.3 °C (97.3 °F), temperature source Temporal, resp. rate (!) 36, height 1.549 m (5' 0.98\"), weight 84 kg (185 lb 3 oz), SpO2 100 %.  Intake/Output last 3 Shifts:  I/O last 3 completed shifts:  In: 2907.5 (34.6 mL/kg) [P.O.:660; I.V.:417.5 (5 mL/kg); NG/GT:480; IV Piggyback:1350]  Out: 4676 (55.7 mL/kg) [Other:4676]  Weight: 84 kg     Relevant Results  Results from last 7 days   Lab Units 04/09/24  1205 04/09/24  0828 04/09/24  0423 04/08/24  1412 04/08/24  0854 04/08/24  0544 04/07/24  1640 04/07/24  1244 04/07/24  1235 04/07/24  0909 04/07/24  0531   POCT GLUCOSE mg/dL 209* 176*  --   --  184*  --  158* 186*  --    < >  --    GLUCOSE mg/dL  --   --  168* 157*  --  173*  --   --  187*  --  146*    < > = values in this interval not displayed.     Results from last 7 days   Lab Units 04/09/24  0423 04/08/24  0544   TSH mIU/L 10.69* 19.48*   FREE T4 ng/dL 0.74* 0.63*   T3 FREE pg/mL  --  0.8*        Assessment/Plan   Principal Problem:    Cardiogenic shock (CMS/HCC)  Active Problems:    Heart transplant recipient (CMS/HCC)    ESRD (end stage renal disease) on dialysis (CMS/HCC)    Immunosuppression (CMS/HCC)    HIRAM (acute kidney injury) (CMS/HCC)    Acute passive congestion of liver    Hyponatremia    Iron deficiency anemia    Abdominal pain    Systolic congestive heart " failure (CMS/HCC)    History of left ventricular assist device (CMS/HCC)    History of extracorporeal membrane oxygenation treatment    Moderate protein-calorie malnutrition (CMS/HCC)    Heart transplant as cause of abnormal reaction or later complication (CMS/HCC)    Cardiac transplant rejection (CMS/HCC)    Acute combined systolic (congestive) and diastolic (congestive) heart failure (CMS/HCC)    Ms. Yimi Bowles is a 34F with a PMHx sig for stage D HFrEF (PPCM) s/p ICD s/p CardioMEMs, hypothyroidism 2/2 thyroidectomy, obesity s/p gastric bypass (2017), and SLE who is s/p heart transplant (3/31/2022) with a heart transplant course complicated by RIJ/RUE DVTs, leukopenia, left groin seroma, and asymptomatic 2R ACR (11/2022) treated with steroids, s/p total hysterectomy (2023), Flu A (1/2024).     Originally presented to [facility] ED on 2/15/24 with complaints of N/V x 3 days and inability to keep medications down. Found to have acute systolic heart failure with primary graft failure and cardiogenic shock. Treated for suspected acute cellular vs. acute antibody mediated rejection; however, multiple cardiac biopsies negative for signs of rejection or other causes of graft failure.     Course has been complicated by multiple MCS devices for refractory cardiogenic shock (right femoral IABP: 2/18/24 - 3/1/24, Left femoral VA ECMO: 2/19/24 - 2/29/24, Right axillary impella 5.5: 3/1/24 - remains in place, 2nd right femoral VA ECMO: 3/27/24 - remains in place), ARF requiring RRT, acute liver injury, intubation due to volume overload in setting of pulmonary edema, severe hemolysis 2/2 to impella malposition, mild CMV viremia, bilateral provoked IJ DVTs, multiple episodes of epistaxis & coagulopathies requiring ongoing blood product transfusions.        Transferred to CTICU on 3/27/24 following right femoral VA ECMO placement due to worsening cardiogenic shock and multi-organ failure despite Impella 5  .5 support and multiple  inotropes.      Endocrinology was initially consulted to assist with levothyroxine dosing on 3/4 given TSH of 35. Patient home dose levothyroxine is 200 mcg daily.    Recommendations were to increase her dose of levothyroxine to 250 between 3/5 to 3/11 and then she was tapered back to 200 mcg daily of levothyroxine.  Endocrinology later signed off.    Endocrine is now reengaged to assess her levothyroxine dosing given that the patient is being considered for kidney and heart transplant.Her weight-based dose would be less than 150 mcg daily.     TFTs on day of consult 3/4/2024: TSH 35.9, free T4 0.9.    Dose was increased to 250 mcg from 3/5 to 3/11 then tapered back to 200 mcg daily.    Repeat labs on 3/18/2024: TSH 20.74, free T4 1.1    Patient has been on 200 mcg of levothyroxine and TFTs on 4/1/2024: TSH 10.13, free T4 0.7, 4/9/2024 TSH 10.69, free T40.74    Recommendation  -adjusted the levothyroxine to 250  - ordered tfts for next tues 4/16    Pramod Rivera MD  Endocrinology, PGY 4  Pager [phone] or secure chat     Case discussed with Dr. Dinh

## 2024-04-09 NOTE — PROGRESS NOTES
Physical Therapy    Physical Therapy Treatment    Patient Name: Charla Bowles  MRN: [id number]  Today's Date: 4/9/2024  Time Calculation  Start Time: 1232  Stop Time: 1304  Time Calculation (min): 32 min       Assessment/Plan   PT Assessment  PT Assessment Results: Decreased strength, Decreased endurance, Impaired balance, Decreased mobility  Rehab Prognosis: Good  Evaluation/Treatment Tolerance: Patient tolerated treatment well  Medical Staff Made Aware: Yes  End of Session Communication: Bedside nurse, Physician, PCT/NA/CTA  End of Session Patient Position: Bed, 3 rail up, Alarm off, not on at start of session  PT Plan  Inpatient/Swing Bed or Outpatient: Inpatient  PT Plan  Treatment/Interventions: Bed mobility, Transfer training, Balance training, Neuromuscular re-education, Strengthening, Endurance training, Therapeutic exercise, Therapeutic activity  PT Plan: Skilled PT  PT Frequency: 4 times per week  PT Discharge Recommendations: High intensity level of continued care  PT Recommended Transfer Status: Assist x2  PT - OK to Discharge: Yes      General Visit Information:   PT  Visit  PT Received On: 04/09/24  Response to Previous Treatment: Patient with no complaints from previous session.  General  Family/Caregiver Present: No  Co-Treatment: OT  Co-Treatment Reason: Pt on ECMO requiring skilled 2 person assist for safe mobility  Prior to Session Communication: Bedside nurse, PCT/NA/CTA  Patient Position Received: Bed, 3 rail up, Alarm off, not on at start of session  Preferred Learning Style: auditory, verbal  General Comment: Pt awake, alert and willing to participate in therapy session.  Assisted pt with bed mobility, EOB sitting and sit<>stand transfers from EOB.  Pt with stable vitals and ECMO numbers throughout session.  Pt on VA ECMO flow 3.13/3.09, 90% FiO2, sweep 3. R axillary impella (P-2), flow 0.7. ECMO and impella examined pre, during and post mobility. CVVH through ECMO. Stable and secure.  (, central line, teley, maxwell)    Subjective   Precautions:  Precautions  Medical Precautions: Cardiac precautions, Fall precautions  Precautions Comment: R axillary impella precautions- no pushing/pulling with R UE, No lifting R UE above shoulder height, no R UE humeral EXT past plane of body, R femoral ECMO precautions, MAP 70-90, protective precautions  Vital Signs:  Vital Signs  Heart Rate: 110 (Post: 117)  Heart Rate Source: Monitor  Resp: (!) 34 (Post: 50)  SpO2: 91 % (Post: 90)  BP: 92/82 (Post: 102/90)  MAP (mmHg): 86 (Post: 95)  BP Method: Arterial line  Patient Position: Lying    Objective   Pain:  Pain Assessment  Pain Assessment:  (Pt did not report pain this date however has constant back discomfort.)  Cognition:  Cognition  Overall Cognitive Status: Impaired  Arousal/Alertness: Delayed responses to stimuli  Orientation Level: Oriented X4  Following Commands: Follows one step commands with increased time  Activity Tolerance:  Activity Tolerance  Endurance: Tolerates 10 - 20 min exercise with multiple rests  Early Mobility/Exercise Safety Screen: Proceed with mobilization - No exclusion criteria met  Treatments:  Therapeutic Exercise  Therapeutic Exercise Performed: Yes  Therapeutic Exercise Activity 1: 1x5 knee flex/ext (Assistance with movement of RLE)    Therapeutic Activity  Therapeutic Activity Performed: Yes  Therapeutic Activity 1: Increased time for skilled ICU line/tube management for safe functional mobility  Therapeutic Activity 2: Pt sat EOB ~15 min with CGA for safety. Intermittent cues for safety awareness required 2/2 impulsive lateral leans and graspsing of ECMO and impella tubing.  Stable vitals throughout session.    Bed Mobility  Bed Mobility: Yes  Bed Mobility 1  Bed Mobility 1: Supine to sitting, Sitting to supine  Level of Assistance 1: Dependent (x2)  Bed Mobility Comments 1: HOB elevated, RN holding ECMO lines, CNP assessing ECMO flow  Bed Mobility 2  Bed Mobility  2:  (Boost  towards HOB)  Level of Assistance 2: Dependent (x2)  Bed Mobility Comments 2: HOB flat, draw sheet utilized    Ambulation/Gait Training  Ambulation/Gait Training Performed: No  Transfers  Transfer: Yes  Transfer 1  Transfer From 1: Sit to, Stand to  Transfer to 1: Sit, Stand  Technique 1: Sit to stand, Stand to sit  Transfer Device 1:  (B arm in arm)  Transfer Level of Assistance 1: Maximum assistance (x2)  Trials/Comments 1: 1st/3rd trials able to achieve <25% full stand, 2nd trial able to complete ~50% partial stand.  Cues to extends knees and bring hips forward to assist with transfer. (B knees blocked and draw sheet utilized to complete)    Stairs  Stairs: No    Outcome Measures:  [facility] Basic Mobility  Turning from your back to your side while in a flat bed without using bedrails: A lot  Moving from lying on your back to sitting on the side of a flat bed without using bedrails: A lot  Moving to and from bed to chair (including a wheelchair): Total  Standing up from a chair using your arms (e.g. wheelchair or bedside chair): Total  To walk in hospital room: Total  Climbing 3-5 steps with railing: Total  Basic Mobility - Total Score: 8    FSS-ICU  Ambulation: Unable to attempt due to weakness  Rolling: Total assistance (performs 25% or requires another person)  Sitting: Supervision or set-up only  Transfer Sit-to-Stand: Total assistance (performs 25% or requires another person)  Transfer Supine-to-Sit: Total assistance (performs 25% or requires another person)  Total Score: 8    Education Documentation  Precautions, taught by Michelle Lee PT at 4/9/2024  3:20 PM.  Learner: Patient  Readiness: Acceptance  Method: Explanation  Response: Needs Reinforcement    Body Mechanics, taught by Michelle Lee PT at 4/9/2024  3:20 PM.  Learner: Patient  Readiness: Acceptance  Method: Explanation  Response: Needs Reinforcement    Mobility Training, taught by Michelle Lee PT at 4/9/2024  3:20 PM.  Learner:  Patient  Readiness: Acceptance  Method: Explanation  Response: Needs Reinforcement    Education Comments  No comments found.    EDUCATION:       Encounter Problems       Encounter Problems (Active)       Balance       Pt will demonstrate ability to complete sitting static/dynamic balance activities with unilateral UE support and no LOB for increase in safety up D/C.  (Progressing)       Start:  04/02/24    Expected End:  04/23/24               Mobility       Pt will demonstrated ability to complete 5 lateral side steps with modAx1, LRAD, proper form and no balance deficits for safe DC. (Progressing)       Start:  04/02/24    Expected End:  04/23/24            Pt will demonstrate ability to complete ther ex activities to improve functional strength for increase in independence upon DC.  (Progressing)       Start:  04/02/24    Expected End:  04/23/24               PT Transfers       Pt will demonstrated ability to complete bed mobility and sit<>stand transfers with mod assistance x2 and use of assistive device to safely DC. (Progressing)       Start:  04/02/24    Expected End:  04/23/24

## 2024-04-09 NOTE — PROGRESS NOTES
Mom at bedside  HFICU Attending Note    Principal Problem:    Cardiogenic shock (CMS/[facility])  Active Problems:    Heart transplant recipient (CMS/[facility])    ESRD (end stage renal disease) on dialysis (CMS/[facility])    Immunosuppression (CMS/[facility])    HIRAM (acute kidney injury) (CMS/[facility])    Acute passive congestion of liver    Hyponatremia    Iron deficiency anemia    Abdominal pain    Systolic congestive heart failure (CMS/HCC)    History of left ventricular assist device (CMS/[facility])    History of extracorporeal membrane oxygenation treatment    Moderate protein-calorie malnutrition (CMS/[facility])    Heart transplant as cause of abnormal reaction or later complication (CMS/[facility])    Cardiac transplant rejection (CMS/[facility])    Acute combined systolic (congestive) and diastolic (congestive) heart failure (CMS/[facility])    Charla Bowles remains cardiovascularly supported with ECMO (~3 L), Impella 5.5 (~0.8 L).  She has been coagulopathic, but responded favorably to a dose of vitamin K yesterday.  There continued to be concern for infection of uncertain source.  Her cultures continue to be NGTD but we will plan for urine (if possible), stool, and sputum cultures today.  Her case was discussed at the [facility] meeting this morning and recommendation made to hold immunosuppression given concern for infection.  Consequently, Myfortic, and tacrolimus discontinued today.    This afternoon she underwent CT of chest, abdomen, pelvis and has bilateral pneumonic process.  We will be discussing with the infectious disease team agree narrowing of her antimicrobial regimen.    She is not currently well enough for reactivation, but hopefully later this week.    Her mom was at the bedside, and has been fully updated.    This critically ill patient continues to be at-risk for clinically significant deterioration / failure due to the above mentioned dysfunctional, unstable organ systems.  I have personally identified and managed all complex critical care issues  to prevent aforementioned clinical deterioration.  Critical care time is spent at bedside and/or the immediate area and has included, but is not limited to, the review of diagnostic tests, labs, radiographs, serial assessments of hemodynamics, respiratory status, ventilatory management, and family updates.  Time spent in procedures and teaching are reported separately.    Critical care time: 60 minutes

## 2024-04-09 NOTE — PROGRESS NOTES
"Palliative Medicine following for:  Complex medical decision making, symptom management, patient/family support    History obtained from chart review including ED note, H&P, patient's daily progress notes, review of lab/test results, and discussion with primary team and bedside RN.    Subjective    History of Present Illness  Has intermittently delirious, anxious and precedex- but precedex mostly at night.   Regular Bowels.   Was open to trying mindfulness exercises.     Objective    Last Recorded Vitals  BP 92/78 Comment: post: 86/70  Pulse (!) 111   Temp 36.1 °C (97 °F) (Temporal)   Resp (!) 56   Ht 1.549 m (5' 0.98\")   Wt 84 kg (185 lb 3 oz)   SpO2 94%   BMI 35.01 kg/m²      Physical Exam  Constitutional:       Appearance: Normal appearance.   HENT:      Head: Normocephalic and atraumatic.      Mouth/Throat:      Mouth: Mucous membranes are dry.   Eyes:      Conjunctiva/sclera: Conjunctivae normal.   Cardiovascular:      Rate and Rhythm: Normal rate.   Pulmonary:      Comments: tachypneic  Abdominal:      General: Bowel sounds are normal.      Palpations: Abdomen is soft.   Skin:     General: Skin is warm and dry.   Neurological:      General: No focal deficit present.   Psychiatric:      Comments: Anxious.           Relevant Results   Results for orders placed or performed during the hospital encounter of 02/15/24 (from the past 24 hour(s))   Tacrolimus level   Result Value Ref Range    Tacrolimus  6.4 <=15.0 ng/mL   Reticulocytes   Result Value Ref Range    Retic % 3.7 (H) 0.5 - 2.0 %    Retic Absolute 0.087 (H) 0.018 - 0.083 x10*6/uL    Reticulocyte Hemoglobin 27 (L) 28 - 38 pg    Immature Retic fraction 32.0 (H) <=16.0 %   CBC and Auto Differential   Result Value Ref Range    WBC 13.6 (H) 4.4 - 11.3 x10*3/uL    nRBC 2.1 (H) 0.0 - 0.0 /100 WBCs    RBC 2.35 (L) 4.00 - 5.20 x10*6/uL    Hemoglobin 6.9 (L) 12.0 - 16.0 g/dL    Hematocrit 20.9 (L) 36.0 - 46.0 %    MCV 89 80 - 100 fL    MCH 29.4 26.0 - 34.0 pg "    MCHC 33.0 32.0 - 36.0 g/dL    RDW 20.3 (H) 11.5 - 14.5 %    Platelets 109 (L) 150 - 450 x10*3/uL    Immature Granulocytes %, Automated 2.1 (H) 0.0 - 0.9 %    Immature Granulocytes Absolute, Automated 0.29 0.00 - 0.70 x10*3/uL   Lactate Dehydrogenase   Result Value Ref Range    LDH 1,136 (H) 84 - 246 U/L   Hepatic function panel   Result Value Ref Range    Albumin 3.7 3.4 - 5.0 g/dL    Bilirubin, Total 1.7 (H) 0.0 - 1.2 mg/dL    Bilirubin, Direct 0.9 (H) 0.0 - 0.3 mg/dL    Alkaline Phosphatase 197 (H) 33 - 110 U/L    ALT 16 7 - 45 U/L    AST 39 9 - 39 U/L    Total Protein 5.7 (L) 6.4 - 8.2 g/dL   TSH with reflex to Free T4 if abnormal   Result Value Ref Range    Thyroid Stimulating Hormone 19.48 (H) 0.44 - 3.98 mIU/L   T3, free   Result Value Ref Range    Triiodothyronine, Free 0.8 (L) 2.3 - 4.2 pg/mL   ECMO VENOUS FULL PANEL   Result Value Ref Range    POCT pH, Venous ECMO 7.21 Reference range not established pH    POCT pCO2, Venous ECMO 63 Reference range not established mm Hg    POCT pO2,  Venous  ECMO 30 Reference range not established mm Hg    POCT SO2, Venous ECMO 54 Reference range not established %    POCT Oxy Hemoglobin, Venous ECMO 52.7 Reference range not established %    POCT Hematocrit Calculated, Venous ECMO 22.0 (L) 36.0 - 46.0 %    POCT Sodium, Venous ECMO 133 (L) 136 - 145 mmol/L    POCT Potassium, Venous ECMO 4.5 3.5 - 5.3 mmol/L    POCT Chloride, Venous ECMO 102 98 - 107 mmol/L    POCT Ionized Calcium, Venous ECMO 1.12 1.10 - 1.33 mmol/L    POCT Glucose, Venous ECMO 175 (H) 74 - 99 mg/dL    POCT Lactate Venous ECMO 0.8 0.4 - 2.0 mmol/L    POCT Base Excess, Venous ECMO -2.8 Reference range not established mmol/L    POCT HCO3 Calculated, Venous ECMO 25.2 Reference range not established mmol/L    POCT Hemoglobin, Venous ECMO 7.4 (L) 12.0 - 16.0 g/dL    POCT Anion Gap, Venous ECMO 10 Reference range not established mmo/L    Patient Temperature 37.0 degrees Celsius    FiO2 90 %   Thyroxine, Free    Result Value Ref Range    Thyroxine, Free 0.63 (L) 0.78 - 1.48 ng/dL   Manual Differential   Result Value Ref Range    Neutrophils %, Manual 91.4 40.0 - 80.0 %    Bands %, Manual 4.3 0.0 - 5.0 %    Lymphocytes %, Manual 3.4 13.0 - 44.0 %    Monocytes %, Manual 0.9 2.0 - 10.0 %    Eosinophils %, Manual 0.0 0.0 - 6.0 %    Basophils %, Manual 0.0 0.0 - 2.0 %    Seg Neutrophils Absolute, Manual 12.43 (H) 1.20 - 7.00 x10*3/uL    Bands Absolute, Manual 0.58 0.00 - 0.70 x10*3/uL    Lymphocytes Absolute, Manual 0.46 (L) 1.20 - 4.80 x10*3/uL    Monocytes Absolute, Manual 0.12 0.10 - 1.00 x10*3/uL    Eosinophils Absolute, Manual 0.00 0.00 - 0.70 x10*3/uL    Basophils Absolute, Manual 0.00 0.00 - 0.10 x10*3/uL    Total Cells Counted 116     Neutrophils Absolute, Manual 13.01 (H) 1.20 - 7.70 x10*3/uL    RBC Morphology See Below     Polychromasia Mild     Hypochromia Mild     RBC Fragments Few     Ovalocytes Few     Acanthocytes Few    Renal function panel   Result Value Ref Range    Glucose 173 (H) 74 - 99 mg/dL    Sodium 136 136 - 145 mmol/L    Potassium 4.4 3.5 - 5.3 mmol/L    Chloride 100 98 - 107 mmol/L    Bicarbonate 21 21 - 32 mmol/L    Anion Gap 19 10 - 20 mmol/L    Urea Nitrogen 15 6 - 23 mg/dL    Creatinine 0.82 0.50 - 1.05 mg/dL    eGFR >90 >60 mL/min/1.73m*2    Calcium 7.6 (L) 8.6 - 10.6 mg/dL    Phosphorus 2.7 2.5 - 4.9 mg/dL    Albumin 3.6 3.4 - 5.0 g/dL   Lipase   Result Value Ref Range    Lipase 9 9 - 82 U/L   ECMO ARTERIAL FULL PANEL   Result Value Ref Range    POCT pH, Arterial ECMO 7.22 Reference range not established pH    POCT pCO2, Arterial ECMO 61 Reference range not established mm Hg    POCT pO2,  Arterial ECMO 275 Reference range not established mm Hg    POCT SO2, Arterial ECMO 100 Reference range not established %    POCT Oxy Hemoglobin, Arterial ECMO 96.6 Reference range not established %    POCT Hematocrit Calculated, Arterial ECMO 23.0 (L) 36.0 - 46.0 %    POCT Sodium, Arterial  ECMO 131 (L) 136 -  145 mmol/L    POCT Potassium, Arterial  ECMO 4.4 3.5 - 5.3 mmol/L    POCT Chloride, Arterial  ECMO 101 98 - 107 mmol/L    POCT Ionized Calcium, Arterial  ECMO 1.10 1.10 - 1.33 mmol/L    POCT Glucose, Arterial  ECMO 181 (H) 74 - 99 mg/dL    POCT Lactate Arterial  ECMO 0.9 0.4 - 2.0 mmol/L    POCT Base Excess, Arterial ECMO -2.8 Reference range not established mmol/L    POCT HCO3 Calculated, Arterial ECMO 25.0 Reference range not established mmol/L    POCT Hemoglobin, Arterial  ECMO 7.7 (L) 12.0 - 16.0 g/dL    POCT Anion Gap, Arterial  ECMO 9 Reference range not established mmo/L    Patient Temperature 37.0 degrees Celsius    FiO2 90 %   ECMO VENOUS FULL PANEL   Result Value Ref Range    POCT pH, Venous ECMO 7.21 Reference range not established pH    POCT pCO2, Venous ECMO 59 Reference range not established mm Hg    POCT pO2,  Venous  ECMO 31 Reference range not established mm Hg    POCT SO2, Venous ECMO 57 Reference range not established %    POCT Oxy Hemoglobin, Venous ECMO 54.9 Reference range not established %    POCT Hematocrit Calculated, Venous ECMO 22.0 (L) 36.0 - 46.0 %    POCT Sodium, Venous ECMO 134 (L) 136 - 145 mmol/L    POCT Potassium, Venous ECMO 4.2 3.5 - 5.3 mmol/L    POCT Chloride, Venous ECMO 100 98 - 107 mmol/L    POCT Ionized Calcium, Venous ECMO 1.08 (L) 1.10 - 1.33 mmol/L    POCT Glucose, Venous ECMO 165 (H) 74 - 99 mg/dL    POCT Lactate Venous ECMO 0.7 0.4 - 2.0 mmol/L    POCT Base Excess, Venous ECMO -4.2 Reference range not established mmol/L    POCT HCO3 Calculated, Venous ECMO 23.6 Reference range not established mmol/L    POCT Hemoglobin, Venous ECMO 7.3 (L) 12.0 - 16.0 g/dL    POCT Anion Gap, Venous ECMO 15 Reference range not established mmo/L    Patient Temperature 37.0 degrees Celsius    FiO2 90 %   Coagulation Screen   Result Value Ref Range    Protime 32.7 (H) 9.8 - 12.8 seconds    INR 2.9 (H) 0.9 - 1.1    aPTT 53 (H) 27 - 38 seconds   Sars-CoV-2 and Influenza A/B PCR   Result Value  Ref Range    Flu A Result Not Detected Not Detected    Flu B Result Not Detected Not Detected    Coronavirus 2019, PCR Not Detected Not Detected   POCT GLUCOSE   Result Value Ref Range    POCT Glucose 184 (H) 74 - 99 mg/dL   ECMO ARTERIAL FULL PANEL   Result Value Ref Range    POCT pH, Arterial ECMO 7.19 Reference range not established pH    POCT pCO2, Arterial ECMO 65 Reference range not established mm Hg    POCT pO2,  Arterial ECMO 354 Reference range not established mm Hg    POCT SO2, Arterial ECMO 99 Reference range not established %    POCT Oxy Hemoglobin, Arterial ECMO 97.0 Reference range not established %    POCT Hematocrit Calculated, Arterial ECMO 21.0 (L) 36.0 - 46.0 %    POCT Sodium, Arterial  ECMO 133 (L) 136 - 145 mmol/L    POCT Potassium, Arterial  ECMO 4.5 3.5 - 5.3 mmol/L    POCT Chloride, Arterial  ECMO 102 98 - 107 mmol/L    POCT Ionized Calcium, Arterial  ECMO 1.08 (L) 1.10 - 1.33 mmol/L    POCT Glucose, Arterial  ECMO 144 (H) 74 - 99 mg/dL    POCT Lactate Arterial  ECMO 1.5 0.4 - 2.0 mmol/L    POCT Base Excess, Arterial ECMO -3.4 Reference range not established mmol/L    POCT HCO3 Calculated, Arterial ECMO 24.8 Reference range not established mmol/L    POCT Hemoglobin, Arterial  ECMO 7.1 (L) 12.0 - 16.0 g/dL    POCT Anion Gap, Arterial  ECMO 11 Reference range not established mmo/L    Patient Temperature 37.0 degrees Celsius    FiO2 100 %   Calcium, ionized   Result Value Ref Range    POCT Calcium, Ionized 1.03 (L) 1.1 - 1.33 mmol/L   CBC   Result Value Ref Range    WBC 17.5 (H) 4.4 - 11.3 x10*3/uL    nRBC 2.0 (H) 0.0 - 0.0 /100 WBCs    RBC 2.47 (L) 4.00 - 5.20 x10*6/uL    Hemoglobin 7.1 (L) 12.0 - 16.0 g/dL    Hematocrit 21.6 (L) 36.0 - 46.0 %    MCV 87 80 - 100 fL    MCH 28.7 26.0 - 34.0 pg    MCHC 32.9 32.0 - 36.0 g/dL    RDW 19.9 (H) 11.5 - 14.5 %    Platelets 138 (L) 150 - 450 x10*3/uL   Renal Function Panel   Result Value Ref Range    Glucose 157 (H) 74 - 99 mg/dL    Sodium 135 (L) 136 -  145 mmol/L    Potassium 4.5 3.5 - 5.3 mmol/L    Chloride 99 98 - 107 mmol/L    Bicarbonate 24 21 - 32 mmol/L    Anion Gap 17 10 - 20 mmol/L    Urea Nitrogen 15 6 - 23 mg/dL    Creatinine 0.74 0.50 - 1.05 mg/dL    eGFR >90 >60 mL/min/1.73m*2    Calcium 7.6 (L) 8.6 - 10.6 mg/dL    Phosphorus 3.0 2.5 - 4.9 mg/dL    Albumin 3.9 3.4 - 5.0 g/dL   Blood Gas Arterial Full Panel   Result Value Ref Range    POCT pH, Arterial 7.31 (L) 7.38 - 7.42 pH    POCT pCO2, Arterial 45 (H) 38 - 42 mm Hg    POCT pO2, Arterial 195 (H) 85 - 95 mm Hg    POCT SO2, Arterial 100 94 - 100 %    POCT Oxy Hemoglobin, Arterial 96.8 94.0 - 98.0 %    POCT Hematocrit Calculated, Arterial 23.0 (L) 36.0 - 46.0 %    POCT Sodium, Arterial 131 (L) 136 - 145 mmol/L    POCT Potassium, Arterial 4.2 3.5 - 5.3 mmol/L    POCT Chloride, Arterial 99 98 - 107 mmol/L    POCT Ionized Calcium, Arterial 1.04 (L) 1.10 - 1.33 mmol/L    POCT Glucose, Arterial 153 (H) 74 - 99 mg/dL    POCT Lactate, Arterial 1.6 0.4 - 2.0 mmol/L    POCT Base Excess, Arterial -3.4 (L) -2.0 - 3.0 mmol/L    POCT HCO3 Calculated, Arterial 22.7 22.0 - 26.0 mmol/L    POCT Hemoglobin, Arterial 7.7 (L) 12.0 - 16.0 g/dL    POCT Anion Gap, Arterial 14 10 - 25 mmo/L    Patient Temperature 37.0 degrees Celsius    FiO2 90 %   Blood Gas Arterial Full Panel   Result Value Ref Range    POCT pH, Arterial 7.19 (LL) 7.38 - 7.42 pH    POCT pCO2, Arterial 63 (H) 38 - 42 mm Hg    POCT pO2, Arterial 173 (H) 85 - 95 mm Hg    POCT SO2, Arterial 100 94 - 100 %    POCT Oxy Hemoglobin, Arterial 96.0 94.0 - 98.0 %    POCT Hematocrit Calculated, Arterial 22.0 (L) 36.0 - 46.0 %    POCT Sodium, Arterial 133 (L) 136 - 145 mmol/L    POCT Potassium, Arterial 4.6 3.5 - 5.3 mmol/L    POCT Chloride, Arterial 102 98 - 107 mmol/L    POCT Ionized Calcium, Arterial 1.07 (L) 1.10 - 1.33 mmol/L    POCT Glucose, Arterial 169 (H) 74 - 99 mg/dL    POCT Lactate, Arterial 1.5 0.4 - 2.0 mmol/L    POCT Base Excess, Arterial -4.0 (L) -2.0 -  3.0 mmol/L    POCT HCO3 Calculated, Arterial 24.1 22.0 - 26.0 mmol/L    POCT Hemoglobin, Arterial 7.3 (L) 12.0 - 16.0 g/dL    POCT Anion Gap, Arterial 12 10 - 25 mmo/L    Patient Temperature 37.0 degrees Celsius    FiO2 21 %   Vancomycin, Trough   Result Value Ref Range    Vancomycin, Trough 21.2 (HH) 5.0 - 20.0 ug/mL   Blood Gas Arterial Full Panel   Result Value Ref Range    POCT pH, Arterial 7.27 (L) 7.38 - 7.42 pH    POCT pCO2, Arterial 51 (H) 38 - 42 mm Hg    POCT pO2, Arterial 242 (H) 85 - 95 mm Hg    POCT SO2, Arterial 100 94 - 100 %    POCT Oxy Hemoglobin, Arterial 95.7 94.0 - 98.0 %    POCT Hematocrit Calculated, Arterial 21.0 (L) 36.0 - 46.0 %    POCT Sodium, Arterial 133 (L) 136 - 145 mmol/L    POCT Potassium, Arterial 4.4 3.5 - 5.3 mmol/L    POCT Chloride, Arterial 101 98 - 107 mmol/L    POCT Ionized Calcium, Arterial 1.03 (L) 1.10 - 1.33 mmol/L    POCT Glucose, Arterial 173 (H) 74 - 99 mg/dL    POCT Lactate, Arterial 1.3 0.4 - 2.0 mmol/L    POCT Base Excess, Arterial -3.4 (L) -2.0 - 3.0 mmol/L    POCT HCO3 Calculated, Arterial 23.4 22.0 - 26.0 mmol/L    POCT Hemoglobin, Arterial 7.1 (L) 12.0 - 16.0 g/dL    POCT Anion Gap, Arterial 13 10 - 25 mmo/L    Patient Temperature 37.0 degrees Celsius    FiO2 21 %     *Note: Due to a large number of results and/or encounters for the requested time period, some results have not been displayed. A complete set of results can be found in Results Review.        Allergies  Dapagliflozin, Empagliflozin, Myfortic [mycophenolate sodium], Topiramate, and Latex  Medications  Scheduled medications  acetaminophen, 650 mg, oral, q6h  bisacodyl, 10 mg, rectal, Once  calcium carbonate-vitamin D3, 1 tablet, oral, Daily  cyanocobalamin, 500 mcg, oral, Daily  darbepoetin floyd, 100 mcg, subcutaneous, q14 days  ferrous sulfate (325 mg ferrous sulfate), 65 mg of iron, oral, Daily with breakfast  folic acid, 1 mg, oral, Daily  insulin lispro, 0-5 Units, subcutaneous, TID with  meals  levothyroxine, 200 mcg, oral, Daily  lidocaine, 1 patch, transdermal, Daily  melatonin, 10 mg, oral, Daily  meropenem, 2 g, intravenous, q12h  methocarbamol, 500 mg, oral, q6h TRICIA  micafungin, 100 mg, intravenous, q24h  multivitamin with minerals, 1 tablet, oral, Daily  mycophenolate, 360 mg, oral, BID  nystatin, 5 mL, Swish & Swallow, q6h  oxygen, , inhalation, Continuous - Inhalation  pantoprazole, 40 mg, intravenous, Daily  phytonadione, 10 mg, intravenous, Daily  predniSONE, 10 mg, oral, Daily  psyllium, 1 packet, oral, Daily  QUEtiapine, 50 mg, oral, Nightly  rosuvastatin, 10 mg, oral, Nightly  sennosides-docusate sodium, 1 tablet, oral, BID  sod phos di, mono-K phos mono, 500 mg, oral, 4x daily  sodium chloride, 1 spray, Each Nostril, 4x daily  sulfamethoxazole-trimethoprim, 80 mg of trimethoprim, oral, Daily  tacrolimus, 3.5 mg, oral, q24h  tacrolimus, 4 mg, oral, q24h  valGANciclovir, 450 mg, oral, q48h  vancomycin, 1,000 mg, intravenous, q12h      Continuous medications  dexmedeTOMIDine, 0.1-1.5 mcg/kg/hr, Last Rate: Stopped (04/08/24 0730)  [Held by provider] heparin, 500 Units/hr, Last Rate: 500 Units/hr (04/06/24 0800)  lactated Ringer's, 5 mL/hr, Last Rate: 5 mL/hr (04/08/24 2000)  PrismaSol 4/2.5, 2,400 mL/hr, Last Rate: 2,400 mL/hr (04/08/24 1838)  sodium bicarbonate infusion Impella Purge 25 mEq/1000 mL D5W, 10 mL/hr, Last Rate: 10 mL/hr (04/08/24 2000)      PRN medications  PRN medications: alteplase, bisacodyl, calcium gluconate, calcium gluconate, dextrose, dextrose **OR** glucagon, hydrALAZINE, hydrOXYzine HCL, ipratropium-albuteroL, artificial tears, magnesium sulfate, magnesium sulfate, microfibrllar collagen, mupirocin, naloxone, ondansetron, oxyCODONE, oxygen, sodium chloride, vancomycin     Assessment/Plan    Brienjackelyn Bowles is a delightful 35 yo female who is a heart transplant recpient (3/2023), presented on 2/15/24 with signs of acute cellular rejection on heart biopsy 2/16   and multiorgan dysfunction in the setting of ADHF. Multiple pressors started on admission. IABP started on 2/18.  Was on [facility]-ECMO 2/19-  , the impella 5.5 currently on P8. She has received pulse steroids, was maintained on tacrolimus/sirolimus, 2 sessions of IVIG/plasmapheresis on 2/18, 2/20 and 2 doses of Thymoglobulin 2/18 and 2/19.  Following an endomyocardial biopsy 2/20/2024 which was negative for rejection (in the setting of negative DSAs) - the Thymoglobulin/IVIG/plasmapheresis sessions were stopped. Then treated again with 5 treatments of IVIG/plasmapheresis from 3/5/24. Course c/b worsening cardiogenic shock requiring [facility] ECMO placement on 3/27/24. On 4/2/24, she has been listed Status 1 for Heart/Kidney transplant.     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 possible SLE cardiomyopathy)  #CHF with severely reduced EF s/p ICD  #Acute on chronic CHF requiring inotropic support  #Pulmonary HTN  #SLE  #Hypothyroidism  #Insomnia  #Anxiety  #Delirium  #Nausea- resolved     #psychosocial support  - Music therapy  - Spiritual care support  - Art therapy     #Anxiety/Delirium  - taught mindfulness exercises, which helped.   - continue precedex    Thank you for allowing us to care for this patient. Palliative Team will continue to follow as needed. Please contact team with any questions or concerns.   Team pager [phone] (weekdays)    Tommy Chaudhary MD [facility]  Palliative Medicine Physician  [email or web address]

## 2024-04-09 NOTE — PROGRESS NOTES
[facility]  ADVANCED HEART FAILURE AND TRANSPLANT CARDIOLOGY   Charla Bowles/[id number]    Admit Date: 2/15/2024  Hospital Length of Stay: 54   ICU Length of Stay: 12d 17h   Primary Service: CTICU      Charla Bowles is a 34 y.o. female on day 54 of admission presenting with Cardiogenic shock (CMS/HCC).    Subjective   A/Ox4 this morning but endorses anxiety and decreased far vision. She states the vision changes occur with her periods of anxiety. PERRLA without focal neuro deficits.         Objective     Physical Exam  Constitutional:       Appearance: She is ill-appearing.   HENT:      Head: Normocephalic.      Comments: Not actively bleeding      Mouth/Throat:      Mouth: Mucous membranes are moist.      Pharynx: Oropharynx is clear. No oropharyngeal exudate or posterior oropharyngeal erythema.   Eyes:      Extraocular Movements: Extraocular movements intact.      Conjunctiva/sclera: Conjunctivae normal.      Pupils: Pupils are equal, round, and reactive to light.   Neck:      Vascular: No JVD.   Cardiovascular:      Rate and Rhythm: Tachycardia present.      Comments: Right axillary impella in place ~45cm at hub (no hematoma), Right femoral VA ECMO in place with reperfusion catheter (site appears clean and dry). Dopplerable radial and ulnar pulses bilaterally. Dopplerable PT pulses bilateral. Unable to doppler DP bilaterally.  Pulmonary:      Effort: No accessory muscle usage, respiratory distress or retractions.      Breath sounds: Normal breath sounds. No wheezing, rhonchi or rales.      Comments: RA - 2L NC. Tachypnea w/ RR 20-30. Small amount of blood tinged and purulent sputum.  Abdominal:      General: Abdomen is flat. Bowel sounds are normal. There is no distension.      Palpations: Abdomen is soft. There is no mass.      Hernia: No hernia is present.   Musculoskeletal:         General: Swelling (+1 BLE edema) present. No tenderness. Normal range of motion.       "Cervical back: Full passive range of motion without pain.   Skin:     General: Skin is dry.      Capillary Refill: Capillary refill takes less than 2 seconds.      Coloration: Skin is not jaundiced.      Findings: Bruising and lesion (Left groin wound appears clean with granulation tissue. No signs of infection.) present. No erythema, laceration, rash or wound.   Neurological:      General: No focal deficit present.      Mental Status: She is alert and oriented to person, place, and time. She is confused.   Psychiatric:         Mood and Affect: Mood is anxious.         Behavior: Behavior normal.         Last Recorded Vitals  Blood pressure (!) 99/91, pulse 104, temperature 36.3 °C (97.3 °F), temperature source Temporal, resp. rate (!) 28, height 1.549 m (5' 0.98\"), weight 84 kg (185 lb 3 oz), SpO2 100 %.  Intake/Output last 3 Shifts:  I/O last 3 completed shifts:  In: 2907.5 (34.6 mL/kg) [P.O.:660; I.V.:417.5 (5 mL/kg); NG/GT:480; IV Piggyback:1350]  Out: 4676 (55.7 mL/kg) [Other:4676]  Weight: 84 kg     Relevant Results  Scheduled medications  acetaminophen, 650 mg, oral, q6h  bisacodyl, 10 mg, rectal, Once  calcium carbonate-vitamin D3, 1 tablet, oral, Daily  cyanocobalamin, 500 mcg, oral, Daily  darbepoetin floyd, 100 mcg, subcutaneous, q14 days  ferrous sulfate (325 mg ferrous sulfate), 65 mg of iron, oral, Daily with breakfast  folic acid, 1 mg, oral, Daily  heparin (porcine), 5,000 Units, subcutaneous, q8h  insulin lispro, 0-5 Units, subcutaneous, TID with meals  levothyroxine, 200 mcg, oral, Daily  lidocaine (cardiac), , ,   lidocaine, 1 patch, transdermal, Daily  melatonin, 10 mg, oral, Daily  meropenem, 2 g, intravenous, q12h  micafungin, 100 mg, intravenous, q24h  multivitamin with minerals, 1 tablet, oral, Daily  [Held by provider] mycophenolate, 360 mg, oral, BID  nystatin, 5 mL, Swish & Swallow, q6h  oxygen, , inhalation, Continuous - Inhalation  pantoprazole, 40 mg, intravenous, Daily  phytonadione, 10 " mg, intravenous, Daily  predniSONE, 10 mg, oral, Daily  psyllium, 1 packet, oral, Daily  QUEtiapine, 50 mg, oral, Nightly  rosuvastatin, 10 mg, oral, Nightly  sennosides-docusate sodium, 1 tablet, oral, BID  sod phos di, mono-K phos mono, 500 mg, oral, 4x daily  sodium chloride, 1 spray, Each Nostril, 4x daily  sulfamethoxazole-trimethoprim, 80 mg of trimethoprim, oral, Daily  [Held by provider] tacrolimus, 3.5 mg, oral, q24h  [Held by provider] tacrolimus, 4 mg, oral, q24h  valGANciclovir, 450 mg, oral, q48h  vancomycin, 1,000 mg, intravenous, q12h      Continuous medications  dexmedeTOMIDine, 0.1-1.5 mcg/kg/hr, Last Rate: Stopped (04/09/24 1200)  heparin Impella Purge 25 units/mL in 500 mL D5W, 10 mL/hr, Last Rate: 10 mL/hr (04/09/24 1200)  lactated Ringer's, 5 mL/hr, Last Rate: 5 mL/hr (04/08/24 2000)  PrismaSol 4/2.5, 2,400 mL/hr, Last Rate: 2,400 mL/hr (04/09/24 0731)  sodium bicarbonate infusion Impella Purge 25 mEq/1000 mL D5W, 10 mL/hr, Last Rate: 10 mL/hr (04/09/24 1200)      PRN medications  PRN medications: alteplase, bisacodyl, calcium gluconate, calcium gluconate, dextrose, dextrose **OR** glucagon, hydrALAZINE, hydrOXYzine HCL, ipratropium-albuteroL, lidocaine (cardiac), artificial tears, magnesium sulfate, magnesium sulfate, microfibrllar collagen, mupirocin, naloxone, ondansetron, oxyCODONE, oxygen, sodium chloride, vancomycin     Results for orders placed or performed during the hospital encounter of 02/15/24 (from the past 24 hour(s))   ECMO ARTERIAL FULL PANEL   Result Value Ref Range    POCT pH, Arterial ECMO 7.19 Reference range not established pH    POCT pCO2, Arterial ECMO 65 Reference range not established mm Hg    POCT pO2,  Arterial ECMO 354 Reference range not established mm Hg    POCT SO2, Arterial ECMO 99 Reference range not established %    POCT Oxy Hemoglobin, Arterial ECMO 97.0 Reference range not established %    POCT Hematocrit Calculated, Arterial ECMO 21.0 (L) 36.0 - 46.0 %     POCT Sodium, Arterial  ECMO 133 (L) 136 - 145 mmol/L    POCT Potassium, Arterial  ECMO 4.5 3.5 - 5.3 mmol/L    POCT Chloride, Arterial  ECMO 102 98 - 107 mmol/L    POCT Ionized Calcium, Arterial  ECMO 1.08 (L) 1.10 - 1.33 mmol/L    POCT Glucose, Arterial  ECMO 144 (H) 74 - 99 mg/dL    POCT Lactate Arterial  ECMO 1.5 0.4 - 2.0 mmol/L    POCT Base Excess, Arterial ECMO -3.4 Reference range not established mmol/L    POCT HCO3 Calculated, Arterial ECMO 24.8 Reference range not established mmol/L    POCT Hemoglobin, Arterial  ECMO 7.1 (L) 12.0 - 16.0 g/dL    POCT Anion Gap, Arterial  ECMO 11 Reference range not established mmo/L    Patient Temperature 37.0 degrees Celsius    FiO2 100 %   Cresencio-Fuller PCR, Quant,Plasma   Result Value Ref Range    EBV DNA Result Not Detected Not Detected    EBV PCR Plasma Log     Calcium, ionized   Result Value Ref Range    POCT Calcium, Ionized 1.03 (L) 1.1 - 1.33 mmol/L   CBC   Result Value Ref Range    WBC 17.5 (H) 4.4 - 11.3 x10*3/uL    nRBC 2.0 (H) 0.0 - 0.0 /100 WBCs    RBC 2.47 (L) 4.00 - 5.20 x10*6/uL    Hemoglobin 7.1 (L) 12.0 - 16.0 g/dL    Hematocrit 21.6 (L) 36.0 - 46.0 %    MCV 87 80 - 100 fL    MCH 28.7 26.0 - 34.0 pg    MCHC 32.9 32.0 - 36.0 g/dL    RDW 19.9 (H) 11.5 - 14.5 %    Platelets 138 (L) 150 - 450 x10*3/uL   Renal Function Panel   Result Value Ref Range    Glucose 157 (H) 74 - 99 mg/dL    Sodium 135 (L) 136 - 145 mmol/L    Potassium 4.5 3.5 - 5.3 mmol/L    Chloride 99 98 - 107 mmol/L    Bicarbonate 24 21 - 32 mmol/L    Anion Gap 17 10 - 20 mmol/L    Urea Nitrogen 15 6 - 23 mg/dL    Creatinine 0.74 0.50 - 1.05 mg/dL    eGFR >90 >60 mL/min/1.73m*2    Calcium 7.6 (L) 8.6 - 10.6 mg/dL    Phosphorus 3.0 2.5 - 4.9 mg/dL    Albumin 3.9 3.4 - 5.0 g/dL   Blood Gas Arterial Full Panel   Result Value Ref Range    POCT pH, Arterial 7.31 (L) 7.38 - 7.42 pH    POCT pCO2, Arterial 45 (H) 38 - 42 mm Hg    POCT pO2, Arterial 195 (H) 85 - 95 mm Hg    POCT SO2, Arterial 100 94 - 100 %     POCT Oxy Hemoglobin, Arterial 96.8 94.0 - 98.0 %    POCT Hematocrit Calculated, Arterial 23.0 (L) 36.0 - 46.0 %    POCT Sodium, Arterial 131 (L) 136 - 145 mmol/L    POCT Potassium, Arterial 4.2 3.5 - 5.3 mmol/L    POCT Chloride, Arterial 99 98 - 107 mmol/L    POCT Ionized Calcium, Arterial 1.04 (L) 1.10 - 1.33 mmol/L    POCT Glucose, Arterial 153 (H) 74 - 99 mg/dL    POCT Lactate, Arterial 1.6 0.4 - 2.0 mmol/L    POCT Base Excess, Arterial -3.4 (L) -2.0 - 3.0 mmol/L    POCT HCO3 Calculated, Arterial 22.7 22.0 - 26.0 mmol/L    POCT Hemoglobin, Arterial 7.7 (L) 12.0 - 16.0 g/dL    POCT Anion Gap, Arterial 14 10 - 25 mmo/L    Patient Temperature 37.0 degrees Celsius    FiO2 90 %   Blood Gas Arterial Full Panel   Result Value Ref Range    POCT pH, Arterial 7.19 (LL) 7.38 - 7.42 pH    POCT pCO2, Arterial 63 (H) 38 - 42 mm Hg    POCT pO2, Arterial 173 (H) 85 - 95 mm Hg    POCT SO2, Arterial 100 94 - 100 %    POCT Oxy Hemoglobin, Arterial 96.0 94.0 - 98.0 %    POCT Hematocrit Calculated, Arterial 22.0 (L) 36.0 - 46.0 %    POCT Sodium, Arterial 133 (L) 136 - 145 mmol/L    POCT Potassium, Arterial 4.6 3.5 - 5.3 mmol/L    POCT Chloride, Arterial 102 98 - 107 mmol/L    POCT Ionized Calcium, Arterial 1.07 (L) 1.10 - 1.33 mmol/L    POCT Glucose, Arterial 169 (H) 74 - 99 mg/dL    POCT Lactate, Arterial 1.5 0.4 - 2.0 mmol/L    POCT Base Excess, Arterial -4.0 (L) -2.0 - 3.0 mmol/L    POCT HCO3 Calculated, Arterial 24.1 22.0 - 26.0 mmol/L    POCT Hemoglobin, Arterial 7.3 (L) 12.0 - 16.0 g/dL    POCT Anion Gap, Arterial 12 10 - 25 mmo/L    Patient Temperature 37.0 degrees Celsius    FiO2 21 %   Vancomycin, Trough   Result Value Ref Range    Vancomycin, Trough 21.2 (HH) 5.0 - 20.0 ug/mL   Blood Gas Arterial Full Panel   Result Value Ref Range    POCT pH, Arterial 7.27 (L) 7.38 - 7.42 pH    POCT pCO2, Arterial 51 (H) 38 - 42 mm Hg    POCT pO2, Arterial 242 (H) 85 - 95 mm Hg    POCT SO2, Arterial 100 94 - 100 %    POCT Oxy  Hemoglobin, Arterial 95.7 94.0 - 98.0 %    POCT Hematocrit Calculated, Arterial 21.0 (L) 36.0 - 46.0 %    POCT Sodium, Arterial 133 (L) 136 - 145 mmol/L    POCT Potassium, Arterial 4.4 3.5 - 5.3 mmol/L    POCT Chloride, Arterial 101 98 - 107 mmol/L    POCT Ionized Calcium, Arterial 1.03 (L) 1.10 - 1.33 mmol/L    POCT Glucose, Arterial 173 (H) 74 - 99 mg/dL    POCT Lactate, Arterial 1.3 0.4 - 2.0 mmol/L    POCT Base Excess, Arterial -3.4 (L) -2.0 - 3.0 mmol/L    POCT HCO3 Calculated, Arterial 23.4 22.0 - 26.0 mmol/L    POCT Hemoglobin, Arterial 7.1 (L) 12.0 - 16.0 g/dL    POCT Anion Gap, Arterial 13 10 - 25 mmo/L    Patient Temperature 37.0 degrees Celsius    FiO2 21 %   Blood Gas Arterial Full Panel   Result Value Ref Range    POCT pH, Arterial 7.27 (L) 7.38 - 7.42 pH    POCT pCO2, Arterial 52 (H) 38 - 42 mm Hg    POCT pO2, Arterial 270 (H) 85 - 95 mm Hg    POCT SO2, Arterial 100 94 - 100 %    POCT Oxy Hemoglobin, Arterial 97.0 94.0 - 98.0 %    POCT Hematocrit Calculated, Arterial 21.0 (L) 36.0 - 46.0 %    POCT Sodium, Arterial 132 (L) 136 - 145 mmol/L    POCT Potassium, Arterial 4.4 3.5 - 5.3 mmol/L    POCT Chloride, Arterial 102 98 - 107 mmol/L    POCT Ionized Calcium, Arterial 1.03 (L) 1.10 - 1.33 mmol/L    POCT Glucose, Arterial 194 (H) 74 - 99 mg/dL    POCT Lactate, Arterial 1.4 0.4 - 2.0 mmol/L    POCT Base Excess, Arterial -2.9 (L) -2.0 - 3.0 mmol/L    POCT HCO3 Calculated, Arterial 23.9 22.0 - 26.0 mmol/L    POCT Hemoglobin, Arterial 7.0 (L) 12.0 - 16.0 g/dL    POCT Anion Gap, Arterial 11 10 - 25 mmo/L    Patient Temperature 37.0 degrees Celsius    FiO2 21 %   Blood Gas Arterial Full Panel   Result Value Ref Range    POCT pH, Arterial 7.35 (L) 7.38 - 7.42 pH    POCT pCO2, Arterial 42 38 - 42 mm Hg    POCT pO2, Arterial 280 (H) 85 - 95 mm Hg    POCT SO2, Arterial 100 94 - 100 %    POCT Oxy Hemoglobin, Arterial 97.6 94.0 - 98.0 %    POCT Hematocrit Calculated, Arterial 20.0 (L) 36.0 - 46.0 %    POCT Sodium,  Arterial 133 (L) 136 - 145 mmol/L    POCT Potassium, Arterial 4.1 3.5 - 5.3 mmol/L    POCT Chloride, Arterial 101 98 - 107 mmol/L    POCT Ionized Calcium, Arterial 1.08 (L) 1.10 - 1.33 mmol/L    POCT Glucose, Arterial 183 (H) 74 - 99 mg/dL    POCT Lactate, Arterial 1.5 0.4 - 2.0 mmol/L    POCT Base Excess, Arterial -2.2 (L) -2.0 - 3.0 mmol/L    POCT HCO3 Calculated, Arterial 23.2 22.0 - 26.0 mmol/L    POCT Hemoglobin, Arterial 6.8 (L) 12.0 - 16.0 g/dL    POCT Anion Gap, Arterial 13 10 - 25 mmo/L    Patient Temperature 37.0 degrees Celsius    FiO2 21 %   Reticulocytes   Result Value Ref Range    Retic % 3.8 (H) 0.5 - 2.0 %    Retic Absolute 0.082 0.018 - 0.083 x10*6/uL    Reticulocyte Hemoglobin 26 (L) 28 - 38 pg    Immature Retic fraction 35.5 (H) <=16.0 %   CBC and Auto Differential   Result Value Ref Range    WBC 14.3 (H) 4.4 - 11.3 x10*3/uL    nRBC 4.4 (H) 0.0 - 0.0 /100 WBCs    RBC 2.18 (L) 4.00 - 5.20 x10*6/uL    Hemoglobin 6.4 (LL) 12.0 - 16.0 g/dL    Hematocrit 19.7 (L) 36.0 - 46.0 %    MCV 90 80 - 100 fL    MCH 29.4 26.0 - 34.0 pg    MCHC 32.5 32.0 - 36.0 g/dL    RDW 20.9 (H) 11.5 - 14.5 %    Platelets 97 (L) 150 - 450 x10*3/uL    Immature Granulocytes %, Automated 3.2 (H) 0.0 - 0.9 %    Immature Granulocytes Absolute, Automated 0.46 0.00 - 0.70 x10*3/uL   Lactate Dehydrogenase   Result Value Ref Range    LDH 1,451 (H) 84 - 246 U/L   Hepatic function panel   Result Value Ref Range    Albumin 3.8 3.4 - 5.0 g/dL    Bilirubin, Total 2.0 (H) 0.0 - 1.2 mg/dL    Bilirubin, Direct 1.2 (H) 0.0 - 0.3 mg/dL    Alkaline Phosphatase 280 (H) 33 - 110 U/L    ALT 22 7 - 45 U/L    AST 59 (H) 9 - 39 U/L    Total Protein 5.9 (L) 6.4 - 8.2 g/dL   TSH   Result Value Ref Range    Thyroid Stimulating Hormone 10.69 (H) 0.44 - 3.98 mIU/L   T4, free   Result Value Ref Range    Thyroxine, Free 0.74 (L) 0.78 - 1.48 ng/dL   Calcium, ionized   Result Value Ref Range    POCT Calcium, Ionized 1.04 (L) 1.1 - 1.33 mmol/L   Coagulation  Screen   Result Value Ref Range    Protime 18.3 (H) 9.8 - 12.8 seconds    INR 1.6 (H) 0.9 - 1.1    aPTT 38 27 - 38 seconds   ECMO VENOUS FULL PANEL   Result Value Ref Range    POCT pH, Venous ECMO 7.33 Reference range not established pH    POCT pCO2, Venous ECMO 46 Reference range not established mm Hg    POCT pO2,  Venous  ECMO 29 Reference range not established mm Hg    POCT SO2, Venous ECMO 56 Reference range not established %    POCT Oxy Hemoglobin, Venous ECMO 53.7 Reference range not established %    POCT Hematocrit Calculated, Venous ECMO 20.0 (L) 36.0 - 46.0 %    POCT Sodium, Venous ECMO 134 (L) 136 - 145 mmol/L    POCT Potassium, Venous ECMO 3.9 3.5 - 5.3 mmol/L    POCT Chloride, Venous ECMO 99 98 - 107 mmol/L    POCT Ionized Calcium, Venous ECMO 1.08 (L) 1.10 - 1.33 mmol/L    POCT Glucose, Venous ECMO 166 (H) 74 - 99 mg/dL    POCT Lactate Venous ECMO 1.3 0.4 - 2.0 mmol/L    POCT Base Excess, Venous ECMO -1.5 Reference range not established mmol/L    POCT HCO3 Calculated, Venous ECMO 24.3 Reference range not established mmol/L    POCT Hemoglobin, Venous ECMO 6.7 (L) 12.0 - 16.0 g/dL    POCT Anion Gap, Venous ECMO 15 Reference range not established mmo/L    Patient Temperature 37.0 degrees Celsius    FiO2 90 %   Basic Metabolic Panel   Result Value Ref Range    Glucose 168 (H) 74 - 99 mg/dL    Sodium 137 136 - 145 mmol/L    Potassium 4.2 3.5 - 5.3 mmol/L    Chloride 98 98 - 107 mmol/L    Bicarbonate 23 21 - 32 mmol/L    Anion Gap 20 10 - 20 mmol/L    Urea Nitrogen 15 6 - 23 mg/dL    Creatinine 0.73 0.50 - 1.05 mg/dL    eGFR >90 >60 mL/min/1.73m*2    Calcium 7.9 (L) 8.6 - 10.6 mg/dL   Phosphorus   Result Value Ref Range    Phosphorus 3.2 2.5 - 4.9 mg/dL   Manual Differential   Result Value Ref Range    Neutrophils %, Manual 94.1 40.0 - 80.0 %    Lymphocytes %, Manual 1.7 13.0 - 44.0 %    Monocytes %, Manual 3.4 2.0 - 10.0 %    Eosinophils %, Manual 0.8 0.0 - 6.0 %    Basophils %, Manual 0.0 0.0 - 2.0 %    Seg  Neutrophils Absolute, Manual 13.46 (H) 1.20 - 7.00 x10*3/uL    Lymphocytes Absolute, Manual 0.24 (L) 1.20 - 4.80 x10*3/uL    Monocytes Absolute, Manual 0.49 0.10 - 1.00 x10*3/uL    Eosinophils Absolute, Manual 0.11 0.00 - 0.70 x10*3/uL    Basophils Absolute, Manual 0.00 0.00 - 0.10 x10*3/uL    Total Cells Counted 118     RBC Morphology See Below     Hypochromia Mild     RBC Fragments Few    Blood Gas Arterial Full Panel   Result Value Ref Range    POCT pH, Arterial 7.38 7.38 - 7.42 pH    POCT pCO2, Arterial 39 38 - 42 mm Hg    POCT pO2, Arterial 338 (H) 85 - 95 mm Hg    POCT SO2, Arterial 100 94 - 100 %    POCT Oxy Hemoglobin, Arterial 96.5 94.0 - 98.0 %    POCT Hematocrit Calculated, Arterial 20.0 (L) 36.0 - 46.0 %    POCT Sodium, Arterial 133 (L) 136 - 145 mmol/L    POCT Potassium, Arterial 4.0 3.5 - 5.3 mmol/L    POCT Chloride, Arterial 100 98 - 107 mmol/L    POCT Ionized Calcium, Arterial 1.07 (L) 1.10 - 1.33 mmol/L    POCT Glucose, Arterial 169 (H) 74 - 99 mg/dL    POCT Lactate, Arterial 1.6 0.4 - 2.0 mmol/L    POCT Base Excess, Arterial -1.9 -2.0 - 3.0 mmol/L    POCT HCO3 Calculated, Arterial 23.1 22.0 - 26.0 mmol/L    POCT Hemoglobin, Arterial 6.8 (L) 12.0 - 16.0 g/dL    POCT Anion Gap, Arterial 14 10 - 25 mmo/L    Patient Temperature 37.0 degrees Celsius    FiO2 21 %   ECMO ARTERIAL FULL PANEL   Result Value Ref Range    POCT pH, Arterial ECMO 7.38 Reference range not established pH    POCT pCO2, Arterial ECMO 40 Reference range not established mm Hg    POCT pO2,  Arterial ECMO 365 Reference range not established mm Hg    POCT SO2, Arterial ECMO 100 Reference range not established %    POCT Oxy Hemoglobin, Arterial ECMO 96.7 Reference range not established %    POCT Hematocrit Calculated, Arterial ECMO 30.0 (L) 36.0 - 46.0 %    POCT Sodium, Arterial  ECMO 131 (L) 136 - 145 mmol/L    POCT Potassium, Arterial  ECMO 4.0 3.5 - 5.3 mmol/L    POCT Chloride, Arterial  ECMO 100 98 - 107 mmol/L    POCT Ionized  Calcium, Arterial  ECMO 1.09 (L) 1.10 - 1.33 mmol/L    POCT Glucose, Arterial  ECMO 173 (H) 74 - 99 mg/dL    POCT Lactate Arterial  ECMO 1.5 0.4 - 2.0 mmol/L    POCT Base Excess, Arterial ECMO -1.3 Reference range not established mmol/L    POCT HCO3 Calculated, Arterial ECMO 23.7 Reference range not established mmol/L    POCT Hemoglobin, Arterial  ECMO 10.0 (L) 12.0 - 16.0 g/dL    POCT Anion Gap, Arterial  ECMO 11 Reference range not established mmo/L    Patient Temperature 37.0 degrees Celsius    FiO2 90 %   Blood Gas Arterial Full Panel   Result Value Ref Range    POCT pH, Arterial 7.40 7.38 - 7.42 pH    POCT pCO2, Arterial 37 (L) 38 - 42 mm Hg    POCT pO2, Arterial 316 (H) 85 - 95 mm Hg    POCT SO2, Arterial 100 94 - 100 %    POCT Oxy Hemoglobin, Arterial 96.5 94.0 - 98.0 %    POCT Hematocrit Calculated, Arterial 20.0 (L) 36.0 - 46.0 %    POCT Sodium, Arterial 135 (L) 136 - 145 mmol/L    POCT Potassium, Arterial 4.1 3.5 - 5.3 mmol/L    POCT Chloride, Arterial 103 98 - 107 mmol/L    POCT Ionized Calcium, Arterial 1.05 (L) 1.10 - 1.33 mmol/L    POCT Glucose, Arterial 160 (H) 74 - 99 mg/dL    POCT Lactate, Arterial 1.3 0.4 - 2.0 mmol/L    POCT Base Excess, Arterial -1.7 -2.0 - 3.0 mmol/L    POCT HCO3 Calculated, Arterial 22.9 22.0 - 26.0 mmol/L    POCT Hemoglobin, Arterial 6.6 (L) 12.0 - 16.0 g/dL    POCT Anion Gap, Arterial 13 10 - 25 mmo/L    Patient Temperature 37.0 degrees Celsius    FiO2 21 %   Prepare RBC: 1 Units, Leukocytes Reduced (CMV reduced risk)   Result Value Ref Range    PRODUCT CODE A3455O34     Unit Number [id number]     Unit ABO O     Unit RH POS     XM INTEP COMP     Dispense Status TR     Blood Expiration Date April 29, 2024 23:59 EDT     PRODUCT BLOOD TYPE 5100     UNIT VOLUME 350    Tacrolimus level   Result Value Ref Range    Tacrolimus  6.2 <=15.0 ng/mL   POCT GLUCOSE   Result Value Ref Range    POCT Glucose 176 (H) 74 - 99 mg/dL   ECG 12 lead   Result Value Ref Range    Ventricular Rate  108 BPM    Atrial Rate 108 BPM    RI Interval 136 ms    QRS Duration 96 ms    QT Interval 298 ms    QTC Calculation(Bazett) 399 ms    P Axis 17 degrees    R Axis -5 degrees    T Axis -9 degrees    QRS Count 17 beats    Q Onset 224 ms    P Onset 156 ms    P Offset 221 ms    T Offset 373 ms    QTC Fredericia 362 ms   POCT GLUCOSE   Result Value Ref Range    POCT Glucose 209 (H) 74 - 99 mg/dL     *Note: Due to a large number of results and/or encounters for the requested time period, some results have not been displayed. A complete set of results can be found in Results Review.       Malnutrition Diagnosis Status: Declining  Malnutrition Diagnosis: Moderate malnutrition related to acute disease or injury  As Evidenced by: pt's reported intake likely meeting <75% of estimated needs for at least 7 days, previous 5% weight loss in 1 month, LOS 42.  I agree with the dietitian's malnutrition diagnosis.      Assessment/Plan   Ms. Yimi Bowles is a 34F with a PMHx sig for stage D HFrEF (PPCM) s/p ICD s/p CardioMEMs, hypothyroidism 2/2 thyroidectomy, obesity s/p gastric bypass (2017), and SLE who is s/p OHT (3/31/2022) with a post-OHT course complicated by RIJ/RUE DVTs, leukopenia, left groin seroma, and asymptomatic 2R ACR (11/2022) treated with steroids, s/p total hysterectomy (2023), Flu A (1/2024).     Originally presented to [facility] ED on 2/15/24 with complaints of N/V x 3 days and inability to keep medications down. Found to have acute systolic heart failure with primary graft failure and cardiogenic shock. Treated for suspected acute cellular vs. acute antibody mediated rejection; however, multiple cardiac biopsies negative for pathology indicating rejection or other causes of graft failure. Course has been complicated by multiple MCS devices for refractory cardiogenic shock (right femoral IABP: 2/18/24 - 3/1/24, Left femoral VA ECMO: 2/19/24 - 2/29/24, Right axillary impella 5.5: 3/1/24 - remains in place, 2nd right  femoral VA ECMO: 3/27/24 - remains in place), ARF requiring RRT, acute liver injury, intubation due to hypoxic respiratory failure, severe hemolysis 2/2 to impella malposition, mild CMV viremia, bilateral provoked IJ DVTs, multiple episodes of epistaxis & coagulopathies requiring ongoing blood product transfusions.       Transferred to [facility] on 3/27/24 following right femoral VA ECMO placement due to worsening cardiogenic shock and multi-organ failure despite Impella 5.5 support and multiple inotropes. Attempting for retransplantation of heart and new kidney transplantation.     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  #Acute renal failure requiring RRT   #Acute transaminitis  #Coagulopathy  #Malnutrition  #Thrombocytopenia   #Anemia   #Provoked bilateral IJ DVTs    #Left SFA occlusion   #Suspected HCAP   #Delirium/Anxiety     Donor/Recipient Infectious history:  CMV: -/+ (low grade CMV viremia w/ levels < 35 on 3/1/24, repeat CMV levels remain negative since 4/7/24)  Toxo: -/-   Hep C: -/-     Rejection/Prophylaxis (transplants):  - Steroids: Continue 10mg PO prednisone daily (risk for adrenal insufficiency if stopped)  - Tacrolimus: Stop tacrolimus due to potential infection and risk of preventing transplantation   - Myfortic acid: Stop myfortic acid due to potential infection and risk of preventing transplantation   - Antifungals: nystatin 500,000units Q6  - Antivirals: valcyte 450mg Q48hrs   - Anti PCP & Toxoplasmosis: bactrim 200-40mg daily      Last cardiac biopsy: 2/16/24 with ACR grade 1R and no AMR (extremely low C4d+ detected), 2/20/24 negative for ACR and AMR  Last HLA: 4/2/24 negative for class 1 or 2 antibodies   Last RHC: closing numbers on 3/16/24 /86(97) RA 20, PAP 40/33(37), C.O./C.I. 5.5/2.8, PVR 88, SVR 1115   Last LHC: 2/18/24 negative for CAV and CAD   Last TTE/BOB: 4/6/24 shows severe LVEF w/ global  hypokinesis, severe RV dysfunction, mild-mod MR, mild TR and impella angled posteriorly   Osteopenia/osteoporosis prophylaxis: Vitamin D3 currently held. Continue calcium supplements  Peptic/gastric ulcer prophylaxis: Pantoprazole 40mg daily  CAV Prophylaxis: Holding Aspirin 81mg due to continued thrombocytopenia. Continue rosuvastatin daily.      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 Broad differential including SLE flair, giant cell vasculitis, CAV/CAD, viral cardiomyopathy, sarcoidosis, amyloidosis and allograft rejection amongst many others. 2xallograft biopsies on 2/16 & 2/20 remain negative for any significant pathology. Notably, both biopsies taken after rejection therapies implemented which may have reduced areas of graft damage.    - DSAs remain negative; however, patient may have non-HLA antibodies present. 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   - Negative CAV & CAD via LHC on 2/18/24   - Completed methylpred steroid pulse w/ 1g Q24 x 3 days (2/16-2/18) and prednisone taper   - Thymoglobulin doses: 2/18 & 2/19   - IVIG + PLEX Session: 2/18, 2/20, 3/7, 3/11 and 3/13    - Right femoral VA ECMO flowing 2.7L w/ FIO2 90% and sweep 3  - Right axillary impella for LV venting remains in place and set P3 w/ flows of 0.8-1.0  - LFTs near normal s/p ECMO cannulation  - Increasing LDH since increasing P levels; impella remains poorly positioned and appears to be in contact with mitral valve leaflets   - Intermittently delirious and anxious   - Ongoing leukocytosis & increasing infiltrates on chest x-ray concerning for HCAP; however, looking into other infectious sources  - Remains on broad spectrum antibiotics w/ IV vancomycin, micafungin and meropenem     Transplant Status:  - Was listed Status 1 for combined heart-kidney (listed in [facility]OS on 4/2/24); however, on 4/6 changed to [facility]OS Status 7 for heart & kidney given development of new infection/sepsis, coagulopathy,  encephalopathy/delirium.  - ABO status: O+  - CMV+/EBV+/Toxo-/Hep B-/Hep C-  - Prospective cross match with every donor offer  - Due to potential risk of hyperacute allograft rejection, induction plan will be 500mg solumedrol x 2 (followed by steroid taper) and thymoglobulin       Recommendations:  - Agree with sputum and straight cath for UC   - Follow up infectious workup (so far BCs and fungal labwork remain NGTD)   - Agree with CT C/A/P with contrast to assess for infectious sources   - Agree with starting heparin purge through impella  - Twan with discussing impella removal w/ CT surgeons, given lack of of flow, poor placement, ongoing LDH and potential infectious risk. Likely not providing much LV venting.   - Would treat anemia conservatively, if hemodynamically stable with Hgb <7.0 then can hold off on further pRBC transfusions at this time.   - Following discussions with Transplant Committee, agree with stopping tacrolimus and mycophenolate. At this time, risk for worsening infection in this critically ill patient poses a greater risk of death than potential rejection of her cardiac allograft. This is in the setting of failed cardiac recovery and severe BIV dysfunction. She will remain on MCS via ECMO for cardio-pulmonary support. Heart-kidney transplantation remains her best chance for survival.     Continue excellent care per CTICU team.      Patient was examined and discussed with [facility]iology attending physician, Dr. Pretty Toussaint.    Heart Transplant Team:   [employer]  [id number] during day shift  [id number] during night shift     Keith Jain, CNP

## 2024-04-09 NOTE — PROGRESS NOTES
"4/9 Review:   On 3/27 @ los 41 returned to CTICU for \" requiring escalation to mechanical circulatory support today with re-initiation of VA ECMO.\" Supported by Impella 5.5. ESRD & on dialysis. On 4/1 extubated. Committee: \"Has met criteria for kidney transplant listing s/p RRT x 6 weeks as of 3/29/24. Abdominal Transplant team will formally approve for kidney transplantation pending approval for heart transplantation.  - Heart Transplant committee  formally approved for heart transplantation as status 1 on 4/2/24. On 4/6 status for heart/kidenty changed from 1 to a 7 d/t pneumonia.   "

## 2024-04-09 NOTE — PROGRESS NOTES
Spiritual Care Visit    Clinical Encounter Type  Visited With: Patient and family together  Routine Visit: Introduction  Continue Visiting: Yes  Crisis Visit: Critical care  Referral From: Nurse  Referral To:     Yazidism Encounters  Yazidism Needs: Prayer    Values/Beliefs  Cultural Requests During Hospitalization: none noted  Spiritual Requests During Hospitalization: prayer, comfort , support              Family Spiritual Care Encounters  Family Participation in Care: Consistently demonstrated  Family Support During Treatment: Consistently demonstrated  Caregiver-Patient Relationship: Not compromised         PC-7 Assessment (Level of Unmet Needs)  Existential Struggle: Further assess  Spiritual/Yazidism Struggle: Further assess  Legacy: Further assess  Relationships: Further assess  Fear of Death/Dying: Further assess  Values/Medical Decision Making: Further assess  Ritual/Other: Further assess  PC-7 Score: 0         Taxonomy  Intended Effects: Convey a calming presence, Demonstrate caring and concern, Helping someone feel comforted  Methods: Offer spiritual/Rastafari support, Explore presence of God  Interventions: Active listening, Ask guided questions, Discuss concerns, Wy[place], Prayer for healing    Initial spiritual care visit with patient and her mother, who is bedside. Pt was sleepy, but did engage briefly with . Pt wanted to know if  was Orthodox. She is Orthodox and asked  for prayer, which was lifted up for her.  provided support and comfort and encouragement.  will follow.

## 2024-04-09 NOTE — PROGRESS NOTES
"        INPATIENT TRANSPLANT NEPHROLOGY PROGRESS NOTE          REASON FOR CONSULT: CRRT mgt    SUBJECTION:  [facility] Procedure note  Pt more awake this morning. Family at bedside. No acute complaints at this time.     PHYCISCAL EXAMINATION:    Visit Vitals  BP (!) 99/91   Pulse 103   Temp 36.3 °C (97.3 °F) (Temporal)   Resp (!) 33   Ht 1.549 m (5' 0.98\")   Wt 84 kg (185 lb 3 oz)   SpO2 100%   BMI 35.01 kg/m²   OB Status Hysterectomy   Smoking Status Never   BSA 1.9 m²        04/07 1900 - 04/09 0659  In: 2907.5 [P.O.:660; I.V.:417.5]  Out: 4676      Weight change:       Pulmonary:      Effort: Pulmonary effort is normal. No respiratory distress.      Breath sounds: Normal breath sounds.   Chest:      Comments: Right axillary impella site CDI   Abdominal:      General: Bowel sounds are normal.      Palpations: Abdomen is soft.      Tenderness: There is no abdominal tenderness.   Musculoskeletal:      -No LE edema     General: Skin is warm and dry.      Comments: Left groin ECMO cannulation site with drainage    Neurological:   Somnolent, minimal interaction    MEDICATION LIST: REVIEWED    acetaminophen, 650 mg, q6h  bisacodyl, 10 mg, Once  calcium carbonate-vitamin D3, 1 tablet, Daily  cyanocobalamin, 500 mcg, Daily  darbepoetin floyd, 100 mcg, q14 days  ferrous sulfate (325 mg ferrous sulfate), 65 mg of iron, Daily with breakfast  folic acid, 1 mg, Daily  heparin (porcine), 5,000 Units, q8h  insulin lispro, 0-5 Units, TID with meals  levothyroxine, 200 mcg, Daily  lidocaine (cardiac), ,   lidocaine, 1 patch, Daily  melatonin, 10 mg, Daily  meropenem, 2 g, q12h  micafungin, 100 mg, q24h  multivitamin with minerals, 1 tablet, Daily  [Held by provider] mycophenolate, 360 mg, BID  nystatin, 5 mL, q6h  oxygen, , Continuous - Inhalation  pantoprazole, 40 mg, Daily  phytonadione, 10 mg, Daily  predniSONE, 10 mg, Daily  psyllium, 1 packet, Daily  QUEtiapine, 50 mg, Nightly  rosuvastatin, 10 mg, Nightly  sennosides-docusate " sodium, 1 tablet, BID  sod phos di, mono-K phos mono, 500 mg, 4x daily  sodium chloride, 1 spray, 4x daily  sulfamethoxazole-trimethoprim, 80 mg of trimethoprim, Daily  [Held by provider] tacrolimus, 3.5 mg, q24h  [Held by provider] tacrolimus, 4 mg, q24h  valGANciclovir, 450 mg, q48h  vancomycin, 1,000 mg, q12h      dexmedeTOMIDine, Last Rate: Stopped (04/09/24 1200)  heparin Impella Purge 25 units/mL in 500 mL D5W, Last Rate: 10 mL/hr (04/09/24 1300)  lactated Ringer's, Last Rate: 5 mL/hr (04/08/24 2000)  PrismaSol 4/2.5, Last Rate: 2,400 mL/hr (04/09/24 0731)  sodium bicarbonate infusion Impella Purge 25 mEq/1000 mL D5W, Last Rate: Stopped (04/09/24 1200)      alteplase, 2 mg, PRN  bisacodyl, 10 mg, Daily PRN  calcium gluconate, 1 g, q6h PRN  calcium gluconate, 2 g, q6h PRN  dextrose, 25 g, q15 min PRN  dextrose, 25 g, q15 min PRN   Or  glucagon, 1 mg, q15 min PRN  hydrALAZINE, 5 mg, q4h PRN  hydrOXYzine HCL, 25 mg, q6h PRN  ipratropium-albuteroL, 3 mL, q6h PRN  lidocaine (cardiac), ,   artificial tears, 2 drop, PRN  magnesium sulfate, 2 g, q6h PRN  magnesium sulfate, 4 g, q6h PRN  microfibrllar collagen, , PRN  mupirocin, , BID PRN  naloxone, 0.2 mg, q5 min PRN  ondansetron, 4 mg, q4h PRN  oxyCODONE, 5 mg, q6h PRN  oxygen, , Continuous PRN - O2/gases  sodium chloride, 1 spray, 4x daily PRN  vancomycin, , Daily PRN        ALLERGY:  Allergies   Allergen Reactions    Dapagliflozin GI bleeding and Bleeding     UTI    Urinary tract infection    Empagliflozin Unknown    Myfortic [Mycophenolate Sodium] GI Upset    Topiramate Nausea Only and Nausea/vomiting    Latex Rash       LABS:  Results for orders placed or performed during the hospital encounter of 02/15/24 (from the past 24 hour(s))   Cresencio-Fuller PCR, Quant,Plasma   Result Value Ref Range    EBV DNA Result Not Detected Not Detected    EBV PCR Plasma Log     Calcium, ionized   Result Value Ref Range    POCT Calcium, Ionized 1.03 (L) 1.1 - 1.33 mmol/L   CBC    Result Value Ref Range    WBC 17.5 (H) 4.4 - 11.3 x10*3/uL    nRBC 2.0 (H) 0.0 - 0.0 /100 WBCs    RBC 2.47 (L) 4.00 - 5.20 x10*6/uL    Hemoglobin 7.1 (L) 12.0 - 16.0 g/dL    Hematocrit 21.6 (L) 36.0 - 46.0 %    MCV 87 80 - 100 fL    MCH 28.7 26.0 - 34.0 pg    MCHC 32.9 32.0 - 36.0 g/dL    RDW 19.9 (H) 11.5 - 14.5 %    Platelets 138 (L) 150 - 450 x10*3/uL   Renal Function Panel   Result Value Ref Range    Glucose 157 (H) 74 - 99 mg/dL    Sodium 135 (L) 136 - 145 mmol/L    Potassium 4.5 3.5 - 5.3 mmol/L    Chloride 99 98 - 107 mmol/L    Bicarbonate 24 21 - 32 mmol/L    Anion Gap 17 10 - 20 mmol/L    Urea Nitrogen 15 6 - 23 mg/dL    Creatinine 0.74 0.50 - 1.05 mg/dL    eGFR >90 >60 mL/min/1.73m*2    Calcium 7.6 (L) 8.6 - 10.6 mg/dL    Phosphorus 3.0 2.5 - 4.9 mg/dL    Albumin 3.9 3.4 - 5.0 g/dL   Blood Gas Arterial Full Panel   Result Value Ref Range    POCT pH, Arterial 7.31 (L) 7.38 - 7.42 pH    POCT pCO2, Arterial 45 (H) 38 - 42 mm Hg    POCT pO2, Arterial 195 (H) 85 - 95 mm Hg    POCT SO2, Arterial 100 94 - 100 %    POCT Oxy Hemoglobin, Arterial 96.8 94.0 - 98.0 %    POCT Hematocrit Calculated, Arterial 23.0 (L) 36.0 - 46.0 %    POCT Sodium, Arterial 131 (L) 136 - 145 mmol/L    POCT Potassium, Arterial 4.2 3.5 - 5.3 mmol/L    POCT Chloride, Arterial 99 98 - 107 mmol/L    POCT Ionized Calcium, Arterial 1.04 (L) 1.10 - 1.33 mmol/L    POCT Glucose, Arterial 153 (H) 74 - 99 mg/dL    POCT Lactate, Arterial 1.6 0.4 - 2.0 mmol/L    POCT Base Excess, Arterial -3.4 (L) -2.0 - 3.0 mmol/L    POCT HCO3 Calculated, Arterial 22.7 22.0 - 26.0 mmol/L    POCT Hemoglobin, Arterial 7.7 (L) 12.0 - 16.0 g/dL    POCT Anion Gap, Arterial 14 10 - 25 mmo/L    Patient Temperature 37.0 degrees Celsius    FiO2 90 %   Blood Gas Arterial Full Panel   Result Value Ref Range    POCT pH, Arterial 7.19 (LL) 7.38 - 7.42 pH    POCT pCO2, Arterial 63 (H) 38 - 42 mm Hg    POCT pO2, Arterial 173 (H) 85 - 95 mm Hg    POCT SO2, Arterial 100 94 - 100 %     POCT Oxy Hemoglobin, Arterial 96.0 94.0 - 98.0 %    POCT Hematocrit Calculated, Arterial 22.0 (L) 36.0 - 46.0 %    POCT Sodium, Arterial 133 (L) 136 - 145 mmol/L    POCT Potassium, Arterial 4.6 3.5 - 5.3 mmol/L    POCT Chloride, Arterial 102 98 - 107 mmol/L    POCT Ionized Calcium, Arterial 1.07 (L) 1.10 - 1.33 mmol/L    POCT Glucose, Arterial 169 (H) 74 - 99 mg/dL    POCT Lactate, Arterial 1.5 0.4 - 2.0 mmol/L    POCT Base Excess, Arterial -4.0 (L) -2.0 - 3.0 mmol/L    POCT HCO3 Calculated, Arterial 24.1 22.0 - 26.0 mmol/L    POCT Hemoglobin, Arterial 7.3 (L) 12.0 - 16.0 g/dL    POCT Anion Gap, Arterial 12 10 - 25 mmo/L    Patient Temperature 37.0 degrees Celsius    FiO2 21 %   Vancomycin, Trough   Result Value Ref Range    Vancomycin, Trough 21.2 (HH) 5.0 - 20.0 ug/mL   Blood Gas Arterial Full Panel   Result Value Ref Range    POCT pH, Arterial 7.27 (L) 7.38 - 7.42 pH    POCT pCO2, Arterial 51 (H) 38 - 42 mm Hg    POCT pO2, Arterial 242 (H) 85 - 95 mm Hg    POCT SO2, Arterial 100 94 - 100 %    POCT Oxy Hemoglobin, Arterial 95.7 94.0 - 98.0 %    POCT Hematocrit Calculated, Arterial 21.0 (L) 36.0 - 46.0 %    POCT Sodium, Arterial 133 (L) 136 - 145 mmol/L    POCT Potassium, Arterial 4.4 3.5 - 5.3 mmol/L    POCT Chloride, Arterial 101 98 - 107 mmol/L    POCT Ionized Calcium, Arterial 1.03 (L) 1.10 - 1.33 mmol/L    POCT Glucose, Arterial 173 (H) 74 - 99 mg/dL    POCT Lactate, Arterial 1.3 0.4 - 2.0 mmol/L    POCT Base Excess, Arterial -3.4 (L) -2.0 - 3.0 mmol/L    POCT HCO3 Calculated, Arterial 23.4 22.0 - 26.0 mmol/L    POCT Hemoglobin, Arterial 7.1 (L) 12.0 - 16.0 g/dL    POCT Anion Gap, Arterial 13 10 - 25 mmo/L    Patient Temperature 37.0 degrees Celsius    FiO2 21 %   Blood Gas Arterial Full Panel   Result Value Ref Range    POCT pH, Arterial 7.27 (L) 7.38 - 7.42 pH    POCT pCO2, Arterial 52 (H) 38 - 42 mm Hg    POCT pO2, Arterial 270 (H) 85 - 95 mm Hg    POCT SO2, Arterial 100 94 - 100 %    POCT Oxy  Hemoglobin, Arterial 97.0 94.0 - 98.0 %    POCT Hematocrit Calculated, Arterial 21.0 (L) 36.0 - 46.0 %    POCT Sodium, Arterial 132 (L) 136 - 145 mmol/L    POCT Potassium, Arterial 4.4 3.5 - 5.3 mmol/L    POCT Chloride, Arterial 102 98 - 107 mmol/L    POCT Ionized Calcium, Arterial 1.03 (L) 1.10 - 1.33 mmol/L    POCT Glucose, Arterial 194 (H) 74 - 99 mg/dL    POCT Lactate, Arterial 1.4 0.4 - 2.0 mmol/L    POCT Base Excess, Arterial -2.9 (L) -2.0 - 3.0 mmol/L    POCT HCO3 Calculated, Arterial 23.9 22.0 - 26.0 mmol/L    POCT Hemoglobin, Arterial 7.0 (L) 12.0 - 16.0 g/dL    POCT Anion Gap, Arterial 11 10 - 25 mmo/L    Patient Temperature 37.0 degrees Celsius    FiO2 21 %   Blood Gas Arterial Full Panel   Result Value Ref Range    POCT pH, Arterial 7.35 (L) 7.38 - 7.42 pH    POCT pCO2, Arterial 42 38 - 42 mm Hg    POCT pO2, Arterial 280 (H) 85 - 95 mm Hg    POCT SO2, Arterial 100 94 - 100 %    POCT Oxy Hemoglobin, Arterial 97.6 94.0 - 98.0 %    POCT Hematocrit Calculated, Arterial 20.0 (L) 36.0 - 46.0 %    POCT Sodium, Arterial 133 (L) 136 - 145 mmol/L    POCT Potassium, Arterial 4.1 3.5 - 5.3 mmol/L    POCT Chloride, Arterial 101 98 - 107 mmol/L    POCT Ionized Calcium, Arterial 1.08 (L) 1.10 - 1.33 mmol/L    POCT Glucose, Arterial 183 (H) 74 - 99 mg/dL    POCT Lactate, Arterial 1.5 0.4 - 2.0 mmol/L    POCT Base Excess, Arterial -2.2 (L) -2.0 - 3.0 mmol/L    POCT HCO3 Calculated, Arterial 23.2 22.0 - 26.0 mmol/L    POCT Hemoglobin, Arterial 6.8 (L) 12.0 - 16.0 g/dL    POCT Anion Gap, Arterial 13 10 - 25 mmo/L    Patient Temperature 37.0 degrees Celsius    FiO2 21 %   Reticulocytes   Result Value Ref Range    Retic % 3.8 (H) 0.5 - 2.0 %    Retic Absolute 0.082 0.018 - 0.083 x10*6/uL    Reticulocyte Hemoglobin 26 (L) 28 - 38 pg    Immature Retic fraction 35.5 (H) <=16.0 %   CBC and Auto Differential   Result Value Ref Range    WBC 14.3 (H) 4.4 - 11.3 x10*3/uL    nRBC 4.4 (H) 0.0 - 0.0 /100 WBCs    RBC 2.18 (L) 4.00 -  5.20 x10*6/uL    Hemoglobin 6.4 (LL) 12.0 - 16.0 g/dL    Hematocrit 19.7 (L) 36.0 - 46.0 %    MCV 90 80 - 100 fL    MCH 29.4 26.0 - 34.0 pg    MCHC 32.5 32.0 - 36.0 g/dL    RDW 20.9 (H) 11.5 - 14.5 %    Platelets 97 (L) 150 - 450 x10*3/uL    Immature Granulocytes %, Automated 3.2 (H) 0.0 - 0.9 %    Immature Granulocytes Absolute, Automated 0.46 0.00 - 0.70 x10*3/uL   Lactate Dehydrogenase   Result Value Ref Range    LDH 1,451 (H) 84 - 246 U/L   Hepatic function panel   Result Value Ref Range    Albumin 3.8 3.4 - 5.0 g/dL    Bilirubin, Total 2.0 (H) 0.0 - 1.2 mg/dL    Bilirubin, Direct 1.2 (H) 0.0 - 0.3 mg/dL    Alkaline Phosphatase 280 (H) 33 - 110 U/L    ALT 22 7 - 45 U/L    AST 59 (H) 9 - 39 U/L    Total Protein 5.9 (L) 6.4 - 8.2 g/dL   TSH   Result Value Ref Range    Thyroid Stimulating Hormone 10.69 (H) 0.44 - 3.98 mIU/L   T4, free   Result Value Ref Range    Thyroxine, Free 0.74 (L) 0.78 - 1.48 ng/dL   Calcium, ionized   Result Value Ref Range    POCT Calcium, Ionized 1.04 (L) 1.1 - 1.33 mmol/L   Coagulation Screen   Result Value Ref Range    Protime 18.3 (H) 9.8 - 12.8 seconds    INR 1.6 (H) 0.9 - 1.1    aPTT 38 27 - 38 seconds   ECMO VENOUS FULL PANEL   Result Value Ref Range    POCT pH, Venous ECMO 7.33 Reference range not established pH    POCT pCO2, Venous ECMO 46 Reference range not established mm Hg    POCT pO2,  Venous  ECMO 29 Reference range not established mm Hg    POCT SO2, Venous ECMO 56 Reference range not established %    POCT Oxy Hemoglobin, Venous ECMO 53.7 Reference range not established %    POCT Hematocrit Calculated, Venous ECMO 20.0 (L) 36.0 - 46.0 %    POCT Sodium, Venous ECMO 134 (L) 136 - 145 mmol/L    POCT Potassium, Venous ECMO 3.9 3.5 - 5.3 mmol/L    POCT Chloride, Venous ECMO 99 98 - 107 mmol/L    POCT Ionized Calcium, Venous ECMO 1.08 (L) 1.10 - 1.33 mmol/L    POCT Glucose, Venous ECMO 166 (H) 74 - 99 mg/dL    POCT Lactate Venous ECMO 1.3 0.4 - 2.0 mmol/L    POCT Base Excess,  Venous ECMO -1.5 Reference range not established mmol/L    POCT HCO3 Calculated, Venous ECMO 24.3 Reference range not established mmol/L    POCT Hemoglobin, Venous ECMO 6.7 (L) 12.0 - 16.0 g/dL    POCT Anion Gap, Venous ECMO 15 Reference range not established mmo/L    Patient Temperature 37.0 degrees Celsius    FiO2 90 %   Basic Metabolic Panel   Result Value Ref Range    Glucose 168 (H) 74 - 99 mg/dL    Sodium 137 136 - 145 mmol/L    Potassium 4.2 3.5 - 5.3 mmol/L    Chloride 98 98 - 107 mmol/L    Bicarbonate 23 21 - 32 mmol/L    Anion Gap 20 10 - 20 mmol/L    Urea Nitrogen 15 6 - 23 mg/dL    Creatinine 0.73 0.50 - 1.05 mg/dL    eGFR >90 >60 mL/min/1.73m*2    Calcium 7.9 (L) 8.6 - 10.6 mg/dL   Phosphorus   Result Value Ref Range    Phosphorus 3.2 2.5 - 4.9 mg/dL   Manual Differential   Result Value Ref Range    Neutrophils %, Manual 94.1 40.0 - 80.0 %    Lymphocytes %, Manual 1.7 13.0 - 44.0 %    Monocytes %, Manual 3.4 2.0 - 10.0 %    Eosinophils %, Manual 0.8 0.0 - 6.0 %    Basophils %, Manual 0.0 0.0 - 2.0 %    Seg Neutrophils Absolute, Manual 13.46 (H) 1.20 - 7.00 x10*3/uL    Lymphocytes Absolute, Manual 0.24 (L) 1.20 - 4.80 x10*3/uL    Monocytes Absolute, Manual 0.49 0.10 - 1.00 x10*3/uL    Eosinophils Absolute, Manual 0.11 0.00 - 0.70 x10*3/uL    Basophils Absolute, Manual 0.00 0.00 - 0.10 x10*3/uL    Total Cells Counted 118     RBC Morphology See Below     Hypochromia Mild     RBC Fragments Few    Blood Gas Arterial Full Panel   Result Value Ref Range    POCT pH, Arterial 7.38 7.38 - 7.42 pH    POCT pCO2, Arterial 39 38 - 42 mm Hg    POCT pO2, Arterial 338 (H) 85 - 95 mm Hg    POCT SO2, Arterial 100 94 - 100 %    POCT Oxy Hemoglobin, Arterial 96.5 94.0 - 98.0 %    POCT Hematocrit Calculated, Arterial 20.0 (L) 36.0 - 46.0 %    POCT Sodium, Arterial 133 (L) 136 - 145 mmol/L    POCT Potassium, Arterial 4.0 3.5 - 5.3 mmol/L    POCT Chloride, Arterial 100 98 - 107 mmol/L    POCT Ionized Calcium, Arterial 1.07 (L)  1.10 - 1.33 mmol/L    POCT Glucose, Arterial 169 (H) 74 - 99 mg/dL    POCT Lactate, Arterial 1.6 0.4 - 2.0 mmol/L    POCT Base Excess, Arterial -1.9 -2.0 - 3.0 mmol/L    POCT HCO3 Calculated, Arterial 23.1 22.0 - 26.0 mmol/L    POCT Hemoglobin, Arterial 6.8 (L) 12.0 - 16.0 g/dL    POCT Anion Gap, Arterial 14 10 - 25 mmo/L    Patient Temperature 37.0 degrees Celsius    FiO2 21 %   ECMO ARTERIAL FULL PANEL   Result Value Ref Range    POCT pH, Arterial ECMO 7.38 Reference range not established pH    POCT pCO2, Arterial ECMO 40 Reference range not established mm Hg    POCT pO2,  Arterial ECMO 365 Reference range not established mm Hg    POCT SO2, Arterial ECMO 100 Reference range not established %    POCT Oxy Hemoglobin, Arterial ECMO 96.7 Reference range not established %    POCT Hematocrit Calculated, Arterial ECMO 30.0 (L) 36.0 - 46.0 %    POCT Sodium, Arterial  ECMO 131 (L) 136 - 145 mmol/L    POCT Potassium, Arterial  ECMO 4.0 3.5 - 5.3 mmol/L    POCT Chloride, Arterial  ECMO 100 98 - 107 mmol/L    POCT Ionized Calcium, Arterial  ECMO 1.09 (L) 1.10 - 1.33 mmol/L    POCT Glucose, Arterial  ECMO 173 (H) 74 - 99 mg/dL    POCT Lactate Arterial  ECMO 1.5 0.4 - 2.0 mmol/L    POCT Base Excess, Arterial ECMO -1.3 Reference range not established mmol/L    POCT HCO3 Calculated, Arterial ECMO 23.7 Reference range not established mmol/L    POCT Hemoglobin, Arterial  ECMO 10.0 (L) 12.0 - 16.0 g/dL    POCT Anion Gap, Arterial  ECMO 11 Reference range not established mmo/L    Patient Temperature 37.0 degrees Celsius    FiO2 90 %   Blood Gas Arterial Full Panel   Result Value Ref Range    POCT pH, Arterial 7.40 7.38 - 7.42 pH    POCT pCO2, Arterial 37 (L) 38 - 42 mm Hg    POCT pO2, Arterial 316 (H) 85 - 95 mm Hg    POCT SO2, Arterial 100 94 - 100 %    POCT Oxy Hemoglobin, Arterial 96.5 94.0 - 98.0 %    POCT Hematocrit Calculated, Arterial 20.0 (L) 36.0 - 46.0 %    POCT Sodium, Arterial 135 (L) 136 - 145 mmol/L    POCT Potassium,  Arterial 4.1 3.5 - 5.3 mmol/L    POCT Chloride, Arterial 103 98 - 107 mmol/L    POCT Ionized Calcium, Arterial 1.05 (L) 1.10 - 1.33 mmol/L    POCT Glucose, Arterial 160 (H) 74 - 99 mg/dL    POCT Lactate, Arterial 1.3 0.4 - 2.0 mmol/L    POCT Base Excess, Arterial -1.7 -2.0 - 3.0 mmol/L    POCT HCO3 Calculated, Arterial 22.9 22.0 - 26.0 mmol/L    POCT Hemoglobin, Arterial 6.6 (L) 12.0 - 16.0 g/dL    POCT Anion Gap, Arterial 13 10 - 25 mmo/L    Patient Temperature 37.0 degrees Celsius    FiO2 21 %   Prepare RBC: 1 Units, Leukocytes Reduced (CMV reduced risk)   Result Value Ref Range    PRODUCT CODE B8240K76     Unit Number [id number]     Unit ABO O     Unit RH POS     XM INTEP COMP     Dispense Status TR     Blood Expiration Date April 29, 2024 23:59 EDT     PRODUCT BLOOD TYPE 5100     UNIT VOLUME 350    Tacrolimus level   Result Value Ref Range    Tacrolimus  6.2 <=15.0 ng/mL   POCT GLUCOSE   Result Value Ref Range    POCT Glucose 176 (H) 74 - 99 mg/dL   ECG 12 lead   Result Value Ref Range    Ventricular Rate 108 BPM    Atrial Rate 108 BPM    AZ Interval 136 ms    QRS Duration 96 ms    QT Interval 298 ms    QTC Calculation(Bazett) 399 ms    P Axis 17 degrees    R Axis -5 degrees    T Axis -9 degrees    QRS Count 17 beats    Q Onset 224 ms    P Onset 156 ms    P Offset 221 ms    T Offset 373 ms    QTC Fredericia 362 ms   POCT GLUCOSE   Result Value Ref Range    POCT Glucose 209 (H) 74 - 99 mg/dL     *Note: Due to a large number of results and/or encounters for the requested time period, some results have not been displayed. A complete set of results can be found in Results Review.        ASSESSMENT AND PLAN:    Ms. Yimi Bowles is a 34F with a PMHx sig for stage D HFrEF (PPCM) s/p ICD s/p CardioMEMs, hypothyroidism 2/2 thyroidectomy, obesity s/p gastric bypass (2017), and SLE who is s/p OHT (3/31/2022) with a post-OHT course complicated by RIJ/RUE DVTs, leukopenia, left groin seroma, and asymptomatic 2R ACR  (11/2022) treated with steroids, s/p total hysterectomy (2023), Flu A (1/2024).  Who initially presented on 2/15/2024 in the setting of acute systolic heart failure with the graft failure and cardiogenic shock.  Patient currently is on [facility] ECMO support and Impella with ongoing acute renal failure requiring CRRT.    Principal Problem:    Cardiogenic shock (CMS/[facility])  Active Problems:    Heart transplant recipient (CMS/[facility])    ESRD (end stage renal disease) on dialysis (CMS/[facility])    HIRAM (acute kidney injury) (CMS/[facility])    Acute passive congestion of liver    Hyponatremia    Iron deficiency anemia    Abdominal pain    Cardiac transplant rejection (CMS/[facility])    Acute combined systolic (congestive) and diastolic (congestive) heart failure (CMS/[facility])      CRRT Management Note:  #HIRAM-D, 2/2 ATN in setting of cardiogenic shock. Started on CRRT initially 2/19 and transitioned to iHD however unable to achieve optimal fluid management so transitioned back to CRRT   - Hemodynamically stable on full vent support with ECMO and Impella. I/O: -1.5L. Fluid removal per ICU team. Will continue CVVH 4K.   -Please replete ionized calcium and phosphorus as per the CRRT protocol.  -Of note patient is currently status 7 for heart kidney transplant listed  on 4/2/2024.  Secondary to sepsis versus pneumonia. Plan for CT chest A/P today.     Josue Gil DO  PGY 4 Nephrology Fellow

## 2024-04-09 NOTE — CARE PLAN
The clinical goals for the shift include remain hemodynamically stable during the shift (map 70-80).    The flows increased on ECMO to 3L with 0.5-1L flow via Impella, maintaining a map 80-90 without any pressor support (dose of ivp hydralazine given for map >90). Patient also tolerating fluid removal via the cvvh. CT scan done and urine, sputum, and stool cultures sent to assess for any source of infections.

## 2024-04-09 NOTE — PROGRESS NOTES
"Vancomycin Dosing by Pharmacy- FOLLOW UP    Charla Bowles is a 34 y.o. year old female who Pharmacy has been consulted for vancomycin dosing for pneumonia. Based on the patient's indication and renal status this patient is being dosed based on a goal trough/random level of 15-20.     Renal function is currently stable. CVVH    Current vancomycin dose: 1000 mg given every 12 hours    Most recent trough level: 21.3 mcg/mL    Visit Vitals  BP 92/78 Comment: post: 86/70   Pulse (!) 111   Temp 36.1 °C (97 °F) (Temporal)   Resp (!) 56        Lab Results   Component Value Date    CREATININE 0.74 04/08/2024    CREATININE 0.82 04/08/2024    CREATININE 0.73 04/07/2024    CREATININE 0.70 04/07/2024        Patient weight is No results found for: \"PTWEIGHT\"    No results found for: \"CULTURE\"     I/O last 3 completed shifts:  In: 3529.6 (42 mL/kg) [P.O.:1060; I.V.:569.6 (6.8 mL/kg); NG/GT:300; IV Piggyback:1600]  Out: 4144 (49.3 mL/kg) [Urine:100 (0 mL/kg/hr); Other:4044]  Weight: 84 kg   [unfilled]    Lab Results   Component Value Date    PATIENTTEMP 37.0 04/08/2024    PATIENTTEMP 37.0 04/08/2024    PATIENTTEMP 37.0 04/08/2024        Assessment/Plan    Within goal random/trough level      The next level will be obtained on 4/9 at 2000. May be obtained sooner if clinically indicated.   Will continue to monitor renal function daily while on vancomycin and order serum creatinine at least every 48 hours if not already ordered.  Follow for continued vancomycin needs, clinical response, and signs/symptoms of toxicity.       Solange Alston RPh           "

## 2024-04-10 NOTE — PROGRESS NOTES
"Brienia REGINALD Bowles is a 34 y.o. female on day 55 of admission presenting with Cardiogenic shock (CMS/HCC).    Subjective   Interval History:  -Improving alertness today  -Reports her feeling of \"pressure\" and shortness of breath has improved substantially  -Notes some ongoing cough with reddish coloration  -Abdominal pain seems improved    Objective   Range of Vitals (last 24 hours)  Heart Rate:  []   Temp:  [36.4 °C (97.5 °F)-37 °C (98.6 °F)]   Resp:  [22-65]   BP: ()/(82)   SpO2:  [90 %-100 %]   Daily Weight  04/06/24 : 84 kg (185 lb 3 oz)    Body mass index is 35.01 kg/m².    Physical Exam  GEN: More awake today, acutely ill-appearing.  HEENT: EOMI, anicteric sclera. No oral lesions appreciated today.  CV: Mechanical pump sounds, tachycardic.   RESP: on NC, tachypneic  ABD: Soft, little to no TTP in epigastrium today  EXT: Edema present bilaterally, WWP. Groin line site appear c/d/I; no evidence of acute infection  SKIN: No skin rash    Relevant Results  Labs  Results from last 72 hours   Lab Units 04/10/24  0609 04/09/24  1229 04/09/24  0423 04/08/24  1412 04/08/24  0544   WBC AUTO x10*3/uL 10.9 13.0* 14.3*   < > 13.6*   HEMOGLOBIN g/dL 7.5* 7.8* 6.4*   < > 6.9*   HEMATOCRIT % 23.2* 23.9* 19.7*   < > 20.9*   PLATELETS AUTO x10*3/uL 108* 122* 97*   < > 109*   NEUTROS PCT AUTO % 83.7  --   --   --   --    LYMPHO PCT MAN %  --   --  1.7  --  3.4   LYMPHS PCT AUTO % 3.7  --   --   --   --    MONO PCT MAN %  --   --  3.4  --  0.9   MONOS PCT AUTO % 7.5  --   --   --   --    EOSINO PCT MAN %  --   --  0.8  --  0.0   EOS PCT AUTO % 0.8  --   --   --   --     < > = values in this interval not displayed.     Results from last 72 hours   Lab Units 04/10/24  0609 04/09/24  1229 04/09/24  0423   SODIUM mmol/L 138 136 137   POTASSIUM mmol/L 3.8 4.0 4.2   CHLORIDE mmol/L 100 98 98   CO2 mmol/L 27 23 23   BUN mg/dL 14 14 15   CREATININE mg/dL 0.70 0.73 0.73   GLUCOSE mg/dL 177* 167* 168*   CALCIUM mg/dL 8.1* " 8.4* 7.9*   ANION GAP mmol/L 15 19 20   EGFR mL/min/1.73m*2 >90 >90 >90   PHOSPHORUS mg/dL 3.0 1.9* 3.2     Results from last 72 hours   Lab Units 04/10/24  0609 04/09/24  1229 04/09/24  0423 04/08/24  1412 04/08/24  0544   ALK PHOS U/L 286*  --  280*  --  197*   BILIRUBIN TOTAL mg/dL 2.1*  --  2.0*  --  1.7*   BILIRUBIN DIRECT mg/dL 1.0*  --  1.2*  --  0.9*   PROTEIN TOTAL g/dL 5.9*  --  5.9*  --  5.7*   ALT U/L 24  --  22  --  16   AST U/L 54*  --  59*  --  39   ALBUMIN g/dL 3.8 3.8 3.8   < > 3.6  3.7    < > = values in this interval not displayed.     Estimated Creatinine Clearance: 112.4 mL/min (by C-G formula based on SCr of 0.7 mg/dL).  C-Reactive Protein   Date Value Ref Range Status   03/27/2024 4.27 (H) <1.00 mg/dL Final   02/15/2024 6.06 (H) <1.00 mg/dL Final     CRP   Date Value Ref Range Status   01/18/2023 0.68 mg/dL Final     Comment:     REF VALUE  < 1.00         Microbiology  Bacterial  -03/27 Bcx: NG  -04/01 Rcx: Throat araceli  -04/01 BCX: NG  -04/03 BCX: NG  -04/07 BCX: NG     Viral  -03/27 CMV PCR: not detected  -04/08 COVID, Flu: not detected  -04/08 EBV: negative    Fungal:  -04/08 BDG: pending  -04/08 Crypto Ag: negative  -04/08 Histo Ag: pending       Current IS:  -Tacro 3.5/4  -Pred 10     Imaging  Reviewed in Epic, including pending CT C/AP    Assessment/Plan   34yF with SLE; s/p hysterectomy; and HFrEF s/p ICD 2/2 PPCM s/p 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 (CMV -/+, EBV+/+, Toxo -/-, Hep C -/-) c/b prior ACR 11/2022 treated with steroids. She presented to the ED with N/V in 2/2024 and was found with graft failure and cardiogenic shock. Treated for acute cellular versus antibody-mediated rejection with pulse steroids, IVIG/plasmapheresis x5, and 2 doses of thymoglobulin in 2-3/2024, but repeated biopsies were negative for significant rejection. Over this time she has had refractory cardiogenic shock requiring impella and ECMO support with course c/b ARF requiring RRT, ALI, hemolysis in setting of impella  positioning, bilateral internal jugular DVTs, and coagulopathies. She has had a mild CMV viremia for which she remains on valgan; Bactrim for PJP/Toxo, though this has been held since ~2/22 due to thombocytopenias. We were consulted for HoTN, leukocytosis, and AMS. Localizing symptoms for us have largely been epigastric abdominal pain, tachypnea, dry cough, and mild AMS.      Nosocomial infection seems to be the most likely, but will consider OIs as well given her recent IS history. WBC and mentation today seems to be improving, and given her CT findings in conjunction with her improvement in respiratory symptoms today, a VAP seems like a reasonable guess at the moment. Will not favor therapy changes at the moment.     Recommendations:  -Will ask Micro to work-up Rcx if possible  -Will follow-up pending serologic and urine tests  -Continue current vancomycin, meropenem, and micafungin  -Prophylaxis: Bactrim, valgan 450 q48h     We will continue to follow. Please contact ID Team A (fellow Chris Wong MD or myself) via [employer] or [phone].     Jovany Nino MD

## 2024-04-10 NOTE — PROGRESS NOTES
CTICU Progress Note  Charla Bowles/[id number]    Admit Date: 2/15/2024  Hospital Length of Stay: 55   ICU Length of Stay: 13d 12h   CT SURGEON: Dr. Witt    SUBJECTIVE:   CT C/A/P demonstrated severe multifocal pneumonia.     Sat on side of bed for 15 min with improved trunk strength.  Did not stand well despite max assist.   No new pos cultures; C dif negative.  Resp culture not proceeded due to saliva content.   Delirium and anxiety generally improving though still a bit confused at times.    LDH 1433 with Impella P2 and flows 0.2-1.0 LPM.  ECMO flows 3 LPM.  Pulse pressure about 10mmHg, MAP 90mmHg.      MEDICATIONS  Infusions:  dexmedeTOMIDine, Last Rate: Stopped (04/10/24 0700)  heparin Impella Purge 25 units/mL in 500 mL D5W, Last Rate: 10 mL/hr (04/09/24 1800)  lactated Ringer's, Last Rate: 5 mL/hr (04/10/24 0600)  PrismaSol 4/2.5, Last Rate: 2,400 mL/hr (04/09/24 1535)  sodium bicarbonate infusion Impella Purge 25 mEq/1000 mL D5W, Last Rate: Stopped (04/09/24 1200)      Scheduled:  acetaminophen, 650 mg, q6h  bisacodyl, 10 mg, Once  calcium carbonate-vitamin D3, 1 tablet, Daily  cyanocobalamin, 500 mcg, Daily  darbepoetin floyd, 100 mcg, q14 days  ferrous sulfate (325 mg ferrous sulfate), 65 mg of iron, Daily with breakfast  folic acid, 1 mg, Daily  heparin (porcine), 5,000 Units, q8h  insulin lispro, 0-5 Units, TID with meals  levothyroxine, 250 mcg, Daily  lidocaine, 1 patch, Daily  melatonin, 10 mg, Daily  meropenem, 2 g, q12h  micafungin, 100 mg, q24h  multivitamin with minerals, 1 tablet, Daily  [Held by provider] mycophenolate, 360 mg, BID  nystatin, 5 mL, q6h  oxygen, , Continuous - Inhalation  pantoprazole, 40 mg, Daily  phytonadione, 10 mg, Daily  predniSONE, 10 mg, Daily  [Held by provider] psyllium, 1 packet, Daily  QUEtiapine, 50 mg, Nightly  rosuvastatin, 10 mg, Nightly  sennosides-docusate sodium, 1 tablet, BID  sod phos di, mono-K phos mono, 500 mg, 4x daily  sodium chloride, 1 spray,  "4x daily  sulfamethoxazole-trimethoprim, 80 mg of trimethoprim, Daily  [Held by provider] tacrolimus, 3.5 mg, q24h  [Held by provider] tacrolimus, 4 mg, q24h  valGANciclovir, 450 mg, q48h      PRN:  alteplase, 2 mg, PRN  bisacodyl, 10 mg, Daily PRN  calcium gluconate, 1 g, q6h PRN  calcium gluconate, 2 g, q6h PRN  dextrose, 25 g, q15 min PRN  dextrose, 25 g, q15 min PRN   Or  glucagon, 1 mg, q15 min PRN  hydrALAZINE, 5 mg, q4h PRN  hydrOXYzine HCL, 25 mg, q6h PRN  ipratropium-albuteroL, 3 mL, q6h PRN  artificial tears, 2 drop, PRN  magnesium sulfate, 2 g, q6h PRN  magnesium sulfate, 4 g, q6h PRN  microfibrllar collagen, , PRN  mupirocin, , BID PRN  naloxone, 0.2 mg, q5 min PRN  ondansetron, 4 mg, q4h PRN  oxyCODONE, 5 mg, q6h PRN  sodium chloride, 1 spray, 4x daily PRN  vancomycin, , Daily PRN        PHYSICAL EXAM:   Visit Vitals  BP 94/83 Comment: post: 102/92   Pulse 94   Temp 36.4 °C (97.5 °F) (Temporal)   Resp 24   Ht 1.549 m (5' 0.98\")   Wt 84 kg (185 lb 3 oz)   SpO2 93%   BMI 35.01 kg/m²   OB Status Hysterectomy   Smoking Status Never   BSA 1.9 m²     Wt Readings from Last 5 Encounters:   04/06/24 84 kg (185 lb 3 oz)   12/07/23 92.1 kg (203 lb)   12/01/23 93 kg (205 lb)   11/29/23 92.9 kg (204 lb 12.8 oz)   11/09/23 91.3 kg (201 lb 3.2 oz)     INTAKE/OUTPUT:  I/O last 3 completed shifts:  In: 2603.2 (31 mL/kg) [I.V.:533.2 (6.3 mL/kg); Blood:350; NG/GT:1170; IV Piggyback:550]  Out: 3234 (38.5 mL/kg) [Other:3232; Stool:2]  Weight: 84 kg        LDA:  ECMO Cannulation Peripheral Right;Femoral artery;Femoral vein (Active)   Placement Date/Time: 03/27/24 1700   ECMO Type: Peripheral  Cannulation Site: Right;Femoral artery;Femoral vein  Line Securement: Line secured in 2 locations;Securement device;Sutures   Number of days: 10       Arterial Line 03/27/24 Right Radial (Active)   Placement Date/Time: 03/27/24 1545   Orientation: Right  Location: Radial   Number of days: 10       Ventricular Assist Device Impella 5.5 " (Active)   Placement Date/Time: 03/01/24 1956   Placed by: Dr Viramontes  Hand Hygiene Completed: Yes  Site Location: Axilla right  VAD Type: Left ventricular assist device  VAD Brand: Impella 5.5   Number of days: 36       Hemodialysis Cath 04/06/24 Triple lumen Right Non-tunneled catheter Jugular (Active)   Placement Date/Time: 04/06/24 1500   Hand Hygiene Completed: Yes  Site Prep: Usual sterile procedure followed  Site Prep Agent has Completely Dried Before Insertion: Yes  All 5 Sterile Barriers Used (Gloves, Gown, Cap, Mask, Large Sterile Drape): Yes ...   Number of days: 0     Physical Exam:   - CONSTITUTION: Critically ill on MCS  - NEUROLOGIC: A+O vs CAM positive, generally less confused. following in all 4 extremities  - CARDIOVASCULAR: ST, R fem VA ECMO, no bleeding at site. R axillary impella, no bleeding at site. No s/s infection at either site. +distal signals in bilateral lower extremities  - RESPIRATORY: Coarse, diminished bilateral lung sounds, symmetric chest expansion  - GI: Abdomen soft, non tender, non distended, +bowel sounds  - : Anuric, on CVVH via ECMO circuit  - EXTREMITIES: Warm and dry, no peripheral edema  - SKIN: Left femoral skin breakdown with dressing in place  - PSYCHIATRIC: Calm vs anxious    Images: CXR c/f worsening pulmonary edema    Invasive Hemodynamics:    Most Recent Range Past 24hrs   BP (Art) 101/93 Arterial Line BP 1  Min: 88/80  Max: 123/99   MAP(Art) 96 mmHg Arterial Line MAP 1 (mmHg)   Min: 84 mmHg  Max: 109 mmHg   RA/CVP   No data recorded   PA 41/34 No data recorded   PA(mean) 37 mmHg No data recorded   CO 5.5 L/min No data recorded   CI 2.8 L/min/m2 No data recorded   Mixed Venous 35.2 % SVO2 (%)  Min: 35.2 %  Max: 70.9 %   SVR  1115 (dyne*sec)/cm5 No data recorded     ECMO:     Most Recent Range Past 24hrs   Flow 3.01 Patient Flow (L/min)  Min: 2.79  Max: 3.21   Speed 3400 Circuit Flow (RPM)  Min: 3190  Max: 3401   Sweep 3 Sweep Gas Flow Rate (L/min)  Min: 3  Max: 3       Impella:      Most Recent Range Past 24hrs   Performance Level 2 P Level  Min: 2   Min taken time: 04/10/24 0700  Max: 3   Max taken time: 04/09/24 1000   Flow (L/min) 0.9 Flow (L/min)  Min: 0.5   Min taken time: 04/09/24 1100  Max: 1.2   Max taken time: 04/09/24 2000   Motor Current 114/76 Motor Current  Min: 103/78   Min taken time: 04/09/24 1100  Max: 165/74   Max taken time: 04/09/24 1800   Placement Signal No  Placement OK could not be evaluated. This SmartLink does not work with rows of the type: Custom List   Purge (mmHg) 571 Purge Pressure (mmHg)  Min: 306   Min taken time: 04/09/24 1800  Max: 627   Max taken time: 04/09/24 1900   Purge rate (mL/hr) 10.9 Purge Rate (mL/hr)  Min: 10.9   Min taken time: 04/10/24 0700  Max: 11.5   Max taken time: 04/09/24 2100        Daily Risk Screen:  Dialysis/Hemapheresis    Assessment/Plan   34F with a PMHx sig for stage D HFrEF (PPCM) s/p ICD s/p CardioMEMs, hypothyroidism 2/2 thyroidectomy, obesity s/p gastric bypass (2017), and SLE who is s/p OHT (3/31/2022) with a post-OHT course complicated by RIJ/RUE DVTs, leukopenia, left groin seroma, and asymptomatic 2R ACR (11/2022) treated with steroids, s/p total hysterectomy (2023), Flu A (1/2024).     She presented to [facility] ED on 2/15/24 with complaints of N/V x 3 days and inability to keep medications down. Found to have acute systolic heart failure with primary graft failure and cardiogenic shock. Treated for suspected acute cellular vs. acute antibody mediated rejection; however, multiple cardiac biopsies negative for signs of rejection or other causes of graft failure. Her course has been complicated by multiple MCS devices for refractory cardiogenic shock (right femoral IABP: 2/18/24 - 3/1/24, Left femoral VA ECMO: 2/19/24 - 2/29/24, Right axillary impella 5.5: 3/1/24 - remains in place, 2nd right femoral VA ECMO: 3/27/24 - remains in place), ARF requiring RRT, acute liver injury, intubation due to volume overload in  setting of pulmonary edema, severe hemolysis 2/2 to impella malposition, mild CMV viremia, bilateral provoked IJ DVTs, multiple episodes of epistaxis & coagulopathies requiring ongoing blood product transfusions.        Charla was transferred to CTICU on 3/27/24 following right femoral VA ECMO placement due to worsening cardiogenic shock and multi-organ failure despite Impella 5.5 support and multiple inotropes. She was listed Status 1 for heart/kidney on 4/2, however was deactivated (status 7) due to c/f sepsis.  She remains critically ill in the CTICU on MCS with ECMO and an Impella as well as CVVH.       NEURO: She vacillates between being  neuro intact with intermittent delirium and anxiety.  Insomnia supported with multimodals and REST protocol.  Cam positive/negative this AM. She sat on edge of bed with PT 4/8 and stood at side of bed last week. -->  - Serial neuro and pain assessments  - Continue tylenol scheduled  - Continue lidoderm patch for back pain  - PRN oxy 5mg Q6   - Continue melatonin 10 mg HS   - Continue Seroquel 50 mg HS (Qtc 399 6/9)     - May use precedex at bedtime for sleep  - PRN hydroxyzine  - PT/OT Consult; mobilize as able  - CAM ICU score qshift. Sleep/wake cycle hygiene  - REST protocol     ENT: Multiple episodes of epistaxis with interventions by ENT. Most recently in OR 3/27 with L nare packed. Packing removed 4/1. -->  - appreciate ENT recs  - PRN ocean spray and mupiricin for dry nasal passages  - PRN afrin      Cardiac: Patient has a history of heart transplant in March of 2022 with primary graft dysfunction treated for acute cellular and antibody rejection without improvement with negative biopsy with multiple MCS devices for refractory cardiogenic shock (right femoral IABP: 2/18/24 - 3/1/24; Left femoral VA ECMO: 2/19/24 - 2/29/24; Right axillary impella 5.5: 3/1/24 - ; 2nd right femoral VA ECMO: 3/27/24 - ). Elevated LDH and difficulty with flows despite multiple attempts at  repositioning Impella previously.  ECMO flows appeared to have been weaned over the last few days with hopes of decreasing pulmonary edema.  Current ECMO settings RPMs 3190 flows ~2.8L with FiO2 90% and sweep 3; Impella 5.5 P3 with flows ~0.3-1.0 LPM.  ST 1o0s, MAPs 65-70s while asleep and 90's awake today. -->  - Maintain goal MAP 70-90  - Continue full BiV support with ECMO, increase flows to 3 LPM  - Continue Impella at P3, for LV Venting, discuss removal with multidisciplinary team in setting of inadequate positioning, low flows, c/f infection of unknown origin  - Continue rosuvastatin dose 10mg daily for impaired renal excretion  - Trend daily LDH   - Hold home amlodipine     Vascular: 3/27 arterial duplex with proximal SFA occlusion with collateral flow. C/f LLE ischemia (previous ECMO site) with loss of pulses- now monophasic with CK that had been normal and no longer trending. Feet warm with intact motor exam. -->  - appreciate vascular surgery following  - Vascular Surgery following for L SFA- No intervention needed at this time     PULM: She has been intubated multiple times throughout hospital course for procedures. Acute respiratory failure persisting due to acute pulmonary edema had been improving, now increasing pulmonary congestion. Gas exchange augmented by VA ECMO, occasionally on NC for comfort.  IS improved today to 750cc; she is getting RT bronch hygiene TID.  CT chest 4/9 with severe multifocal PNA -->  - CXR daily  - Adjust sweep for pH 7.3 - 7.5  - Maintain SPO2 > 92%     GI: History of gastric bypass and MMF colitis. Shock liver originally resolved; most recently elevated r/t likely congestive hepatopathy that is improving on ECMO. Abdominal pain improved with reduced myfortic dosing. Previous c/f GI bleed, likely blood from epistaxis; no current evidence of GI bleeding. H pylori negative, fecal calprotectin (elevated at 380), fecal lactoferrin (Positive 03/23/24). Last BM 4/9. Poor  nutritional intake.  Dobhoff placed by ENT 4/8 under fiberoptic visualization.  >  - Continue calcitriol 0.5mg daily, multivitamin, calcium carbonate 1,250mg BID  - Continue PPI daily  - Continue TF to goal,  - HOLD PO diet per SLP rec's, sips and chips ok   - Hold Ensure Clear TID and magic cup  - Hold miralax BID & senna-colace BID     :  No history, baseline Cr 1.2-1.3. HIRAM originally on CVVH then with renal recovery but then again worsened and now dialysis dependent and failed diuretic challenges. Hypervolemia improved. Persisting hypophosphatemia despite repeated supplementation. -->  - RFP as clinically indicated, replete electrolytes per CTICU protocol.   - Continue CVVH with goal removal -500 to 1000 pending CVP goal 8-10   - Continue Sodium Phos 500 mg 4x daily  - Nephrology Following     ENDO: History of hypothyroidism and prediabetes. TSH elevated originally to malabsorption then with dosing adjusted by endo; TSH (3/17): 20.74, T4 1.11, T3: 1.4, improved 4/1; TSH 10.13, T4 0.70. 4/8: TSH 19.48, T3 0.8, T4 0.68. Steroid induced hyperglycemia acceptable glycemic control on SSI. -->  - Maintain BG <180 with hypoglycemia protocol  - Continue SSI #1 AC/HS   - Continue Synthroid 250mcg daily.   - Recheck TFT's 6/16 (ordered)   - Appreciate Endocrine Consult      HEME: History of iron deficiency anemia and remote DVT; again with acute DVT LIJ and SVT left cephalic vein and right cephalic vein. Acute blood loss anemia with repeated transfusions with significant hemolysis but no evidence of active bleeding; last received RBC 4/5. Stable thrombocytopenia persisting; last PF4 negative 3/30. No epistaxis today. Received 2 U PRBC 4/5. Coagulopathic with increasing INR but stable LFTs, 2.9 today. Possibly 2/2 malnutrition. Liver ultrasound 4/6 unconcerning. -->  - Continue ferrous sulfate daily  - Avoid unnecessarily transfusing, HGB > 7.0  - Vitamin K x 3 doses (4/8 - 10)   - Hold systemic heparin with coagulopathy  & recent anemia, re-eval daily   - Continue heparin purge in the Impella.   - Trend coags daily  - SCDs, SQH for DVT prophylaxis  - Aranesp every 2 weeks  - Last type and screen: 4/9     Rejection/prophylaxis: S/p heart transplant 3/31/2022. Induction basiliximab. Donor HCV -, toxo -/-, CMV -/+. Mild ACR with +CD4 and negative HLAs however clinical presentation concern for acute cellular vs AMR rejection/stuttering rejection completed courses of thymo/PLEX/IVIG without clinical improvement.  With consideration to progressive graft failure and concern for infection limiting re-transplant at this time, we will hold tacro and myfortic today (4/9 - ).  - Steroids: Continue PO prednisone 10 mg daily  - Hold Tacrolimus: 4.0 mg AM/3.5 mg PM w/ daily levels drawn @ 0600  - Hold Tacrolimus goal troughs: 8-10  - Hold Myfortic acid: 360mg BID.  (Not starting MMF d/t hx of colitis)  - Antifungals: nystatin 500,000units Q6  - Antivirals: valcyte 450mg Q48hrs  - Anti PCP & Toxoplasmosis: continue SS Bactrim with c/f PNA     ID: Afebrile. C/f previous yeast infection. BC 3/27 NGTD final. MRSA screen negative 3/28. Episode of hypotension on 4/1, pan cultured and empiric Vanco/Zosyn started.  Patient was shivering 4/3, concern for risk of infection. Blood cultures sent 4/3, NGTD. Zosyn (4/1- 4/7) then broadened to Susan (4-7 - ) and natalie started (4/7 - ). CT chest 4/9 demonstrates severe multifocal PNA -->  - Trend temp q4h  - Continue Susan and Natalie (4/7 - )  - Continue Vanco (4/6 - )  - Follow up cxs   - ID consulted, requested labs sent (CMV PCR, RVP, COVID/flu (negative), serum beta b glucan, toxo PCR, crypto antigen, urine histo antigen)     Skin: Left groin wound from previous ECMO with wound consulted. Wound cx with NG 3/15.   - Preventative Mepilex dressings in place on sacrum and heels  - Change preventative Mepilex weekly or more frequently as indicated (when moist/soiled)   - Every shift skin assessment per nursing and  weekly ICU skin rounds  - Moisture barrier to be applied with christopher care  - Active skin problems addressed with nursing on daily rounds  - wound recs from note 3/15 ordered      Proph:  SCDs  SQH while holding systemic heparin  PPI     G: Lines  RIJ Trialysis - 4/6  R radial mawxell 3/27  PIV x2    Restraints: Not indicated    Code status: Full Code    I personally spent 50 minutes of critical care time directly and personally managing the patient exclusive of separately billable procedures.     A,B,C,D,E,F,G: reviewed.    A&P reviewed on multidisciplinary critical care rounds      Dispo: CTICU care for now.    CTICU TEAM PHONE [id number]

## 2024-04-10 NOTE — PROGRESS NOTES
[facility]  ADVANCED HEART FAILURE AND TRANSPLANT CARDIOLOGY   Charla Bowles/[id number]    Admit Date: 2/15/2024  Hospital Length of Stay: 55   ICU Length of Stay: 13d 15h   Primary Service: CTICU      Charla Bowles is a 34 y.o. female on day 55 of admission presenting with Cardiogenic shock (CMS/HCC).    Subjective   A/Ox4 this morning. Endorses improved sleep, but continues to have anxiety. States she is cold, requiring multiple blankets wrapped around her.          Objective     Physical Exam  Constitutional:       Appearance: She is ill-appearing.   HENT:      Head: Normocephalic.      Comments: Not actively bleeding      Mouth/Throat:      Mouth: Mucous membranes are moist.      Pharynx: Oropharynx is clear. No oropharyngeal exudate or posterior oropharyngeal erythema.   Eyes:      Extraocular Movements: Extraocular movements intact.      Conjunctiva/sclera: Conjunctivae normal.      Pupils: Pupils are equal, round, and reactive to light.   Neck:      Vascular: No JVD.   Cardiovascular:      Rate and Rhythm: Tachycardia present.      Comments: Right axillary impella in place ~45cm at hub (no hematoma), Right femoral VA ECMO in place with reperfusion catheter (site appears clean and dry). Dopplerable radial and ulnar pulses bilaterally. Dopplerable PT pulses bilateral. Unable to doppler DP bilaterally.  Pulmonary:      Effort: No accessory muscle usage, respiratory distress or retractions.      Breath sounds: Normal breath sounds. No wheezing, rhonchi or rales.      Comments: RA. Tachypnea w/ RR 20-30. Small amount of blood tinged and purulent sputum.  Abdominal:      General: Abdomen is flat. Bowel sounds are normal. There is no distension.      Palpations: Abdomen is soft. There is no mass.      Hernia: No hernia is present.   Musculoskeletal:         General: Swelling (+1 BLE edema) present. No tenderness. Normal range of motion.      Cervical back: Full passive  "range of motion without pain.   Skin:     General: Skin is dry.      Capillary Refill: Capillary refill takes less than 2 seconds.      Coloration: Skin is not jaundiced.      Findings: Bruising and lesion (Left groin wound appears clean with granulation tissue. No signs of infection.) present. No erythema, laceration, rash or wound.   Neurological:      General: No focal deficit present.      Mental Status: She is alert and oriented to person, place, and time. She is confused.   Psychiatric:         Mood and Affect: Mood is anxious.         Behavior: Behavior normal.         Last Recorded Vitals  Blood pressure 94/83, pulse (!) 122, temperature 36.9 °C (98.4 °F), temperature source Temporal, resp. rate (!) 52, height 1.549 m (5' 0.98\"), weight 84 kg (185 lb 3 oz), SpO2 (!) 0%.  Intake/Output last 3 Shifts:  I/O last 3 completed shifts:  In: 2603.2 (31 mL/kg) [I.V.:533.2 (6.3 mL/kg); Blood:350; NG/GT:1170; IV Piggyback:550]  Out: 3234 (38.5 mL/kg) [Other:3232; Stool:2]  Weight: 84 kg     Relevant Results  Scheduled medications  acetaminophen, 650 mg, oral, q6h  bisacodyl, 10 mg, rectal, Once  calcium carbonate-vitamin D3, 1 tablet, oral, Daily  cyanocobalamin, 500 mcg, oral, Daily  darbepoetin floyd, 100 mcg, subcutaneous, q14 days  ferrous sulfate (325 mg ferrous sulfate), 65 mg of iron, oral, Daily with breakfast  folic acid, 1 mg, oral, Daily  heparin (porcine), 5,000 Units, subcutaneous, q8h  insulin lispro, 0-5 Units, subcutaneous, TID with meals  levothyroxine, 250 mcg, oral, Daily  lidocaine, 1 patch, transdermal, Daily  melatonin, 10 mg, oral, Daily  meropenem, 2 g, intravenous, q12h  micafungin, 100 mg, intravenous, q24h  multivitamin with minerals, 1 tablet, oral, Daily  [Held by provider] mycophenolate, 360 mg, oral, BID  nystatin, 5 mL, Swish & Swallow, q6h  oxygen, , inhalation, Continuous - Inhalation  pantoprazole, 40 mg, intravenous, Daily  predniSONE, 10 mg, oral, Daily  [Held by provider] psyllium, " 1 packet, oral, Daily  QUEtiapine, 50 mg, oral, Nightly  rosuvastatin, 10 mg, oral, Nightly  [Held by provider] sennosides-docusate sodium, 1 tablet, oral, BID  sod phos di, mono-K phos mono, 500 mg, oral, 4x daily  sodium chloride, 1 spray, Each Nostril, 4x daily  sulfamethoxazole-trimethoprim, 80 mg of trimethoprim, oral, Daily  [Held by provider] tacrolimus, 3.5 mg, oral, q24h  [Held by provider] tacrolimus, 4 mg, oral, q24h  valGANciclovir, 450 mg, oral, q48h      Continuous medications  dexmedeTOMIDine, 0.1-1.5 mcg/kg/hr, Last Rate: Stopped (04/10/24 0700)  heparin Impella Purge 25 units/mL in 500 mL D5W, 10 mL/hr, Last Rate: 10 mL/hr (04/09/24 1800)  lactated Ringer's, 5 mL/hr, Last Rate: 5 mL/hr (04/10/24 0600)  PrismaSol 4/2.5, 2,400 mL/hr, Last Rate: 2,400 mL/hr (04/09/24 1535)  [Held by provider] sodium bicarbonate infusion Impella Purge 25 mEq/1000 mL D5W, 10 mL/hr, Last Rate: Stopped (04/09/24 1200)      PRN medications  PRN medications: alteplase, bisacodyl, calcium gluconate, calcium gluconate, dextrose, dextrose **OR** glucagon, hydrALAZINE, hydrOXYzine HCL, ipratropium-albuteroL, artificial tears, magnesium sulfate, magnesium sulfate, microfibrllar collagen, mupirocin, naloxone, ondansetron, oxyCODONE, sodium chloride, vancomycin     Results for orders placed or performed during the hospital encounter of 02/15/24 (from the past 24 hour(s))   POCT GLUCOSE   Result Value Ref Range    POCT Glucose 209 (H) 74 - 99 mg/dL   C. difficile, PCR    Specimen: Stool   Result Value Ref Range    C. difficile, PCR Not Detected Not Detected   Renal Function Panel   Result Value Ref Range    Glucose 167 (H) 74 - 99 mg/dL    Sodium 136 136 - 145 mmol/L    Potassium 4.0 3.5 - 5.3 mmol/L    Chloride 98 98 - 107 mmol/L    Bicarbonate 23 21 - 32 mmol/L    Anion Gap 19 10 - 20 mmol/L    Urea Nitrogen 14 6 - 23 mg/dL    Creatinine 0.73 0.50 - 1.05 mg/dL    eGFR >90 >60 mL/min/1.73m*2    Calcium 8.4 (L) 8.6 - 10.6 mg/dL     Phosphorus 1.9 (L) 2.5 - 4.9 mg/dL    Albumin 3.8 3.4 - 5.0 g/dL   CBC   Result Value Ref Range    WBC 13.0 (H) 4.4 - 11.3 x10*3/uL    nRBC 5.5 (H) 0.0 - 0.0 /100 WBCs    RBC 2.67 (L) 4.00 - 5.20 x10*6/uL    Hemoglobin 7.8 (L) 12.0 - 16.0 g/dL    Hematocrit 23.9 (L) 36.0 - 46.0 %    MCV 90 80 - 100 fL    MCH 29.2 26.0 - 34.0 pg    MCHC 32.6 32.0 - 36.0 g/dL    RDW 19.8 (H) 11.5 - 14.5 %    Platelets 122 (L) 150 - 450 x10*3/uL   Calcium, ionized   Result Value Ref Range    POCT Calcium, Ionized 1.07 (L) 1.1 - 1.33 mmol/L   Respiratory Culture/Smear    Specimen: SPUTUM; Fluid   Result Value Ref Range    Respiratory Culture/Smear (A)      Culture not performed. See Gram stain findings. Recollect if clinically indicated.    Gram Stain (A)      Gram stain indicates specimen contains significant salivary contamination.    Gram Stain (A)      A specimen of better quality should be submitted if clinically indicated.   Heparin Assay, UFH   Result Value Ref Range    Heparin Unfractionated 0.1 See Comment Below for Therapeutic Ranges IU/mL   POCT GLUCOSE   Result Value Ref Range    POCT Glucose 204 (H) 74 - 99 mg/dL   Vancomycin   Result Value Ref Range    Vancomycin 27.3 (H) 5.0 - 20.0 ug/mL   Blood Gas Arterial Full Panel   Result Value Ref Range    POCT pH, Arterial 7.38 7.38 - 7.42 pH    POCT pCO2, Arterial 38 38 - 42 mm Hg    POCT pO2, Arterial 276 (H) 85 - 95 mm Hg    POCT SO2, Arterial 100 94 - 100 %    POCT Oxy Hemoglobin, Arterial 96.6 94.0 - 98.0 %    POCT Hematocrit Calculated, Arterial 25.0 (L) 36.0 - 46.0 %    POCT Sodium, Arterial 134 (L) 136 - 145 mmol/L    POCT Potassium, Arterial 4.1 3.5 - 5.3 mmol/L    POCT Chloride, Arterial 101 98 - 107 mmol/L    POCT Ionized Calcium, Arterial 1.02 (L) 1.10 - 1.33 mmol/L    POCT Glucose, Arterial 164 (H) 74 - 99 mg/dL    POCT Lactate, Arterial 3.1 (H) 0.4 - 2.0 mmol/L    POCT Base Excess, Arterial -2.4 (L) -2.0 - 3.0 mmol/L    POCT HCO3 Calculated, Arterial 22.5 22.0 - 26.0  mmol/L    POCT Hemoglobin, Arterial 8.2 (L) 12.0 - 16.0 g/dL    POCT Anion Gap, Arterial 15 10 - 25 mmo/L    Patient Temperature 37.0 degrees Celsius    FiO2 21 %   Reticulocytes   Result Value Ref Range    Retic % 3.7 (H) 0.5 - 2.0 %    Retic Absolute 0.095 (H) 0.018 - 0.083 x10*6/uL    Reticulocyte Hemoglobin 26 (L) 28 - 38 pg    Immature Retic fraction 34.1 (H) <=16.0 %   CBC and Auto Differential   Result Value Ref Range    WBC 10.9 4.4 - 11.3 x10*3/uL    nRBC 3.7 (H) 0.0 - 0.0 /100 WBCs    RBC 2.59 (L) 4.00 - 5.20 x10*6/uL    Hemoglobin 7.5 (L) 12.0 - 16.0 g/dL    Hematocrit 23.2 (L) 36.0 - 46.0 %    MCV 90 80 - 100 fL    MCH 29.0 26.0 - 34.0 pg    MCHC 32.3 32.0 - 36.0 g/dL    RDW 20.0 (H) 11.5 - 14.5 %    Platelets 108 (L) 150 - 450 x10*3/uL    Neutrophils % 83.7 40.0 - 80.0 %    Immature Granulocytes %, Automated 4.2 (H) 0.0 - 0.9 %    Lymphocytes % 3.7 13.0 - 44.0 %    Monocytes % 7.5 2.0 - 10.0 %    Eosinophils % 0.8 0.0 - 6.0 %    Basophils % 0.1 0.0 - 2.0 %    Neutrophils Absolute 9.13 (H) 1.20 - 7.70 x10*3/uL    Immature Granulocytes Absolute, Automated 0.46 0.00 - 0.70 x10*3/uL    Lymphocytes Absolute 0.40 (L) 1.20 - 4.80 x10*3/uL    Monocytes Absolute 0.82 0.10 - 1.00 x10*3/uL    Eosinophils Absolute 0.09 0.00 - 0.70 x10*3/uL    Basophils Absolute 0.01 0.00 - 0.10 x10*3/uL   Lactate Dehydrogenase   Result Value Ref Range    LDH 1,433 (H) 84 - 246 U/L   Hepatic function panel   Result Value Ref Range    Albumin 3.8 3.4 - 5.0 g/dL    Bilirubin, Total 2.1 (H) 0.0 - 1.2 mg/dL    Bilirubin, Direct 1.0 (H) 0.0 - 0.3 mg/dL    Alkaline Phosphatase 286 (H) 33 - 110 U/L    ALT 24 7 - 45 U/L    AST 54 (H) 9 - 39 U/L    Total Protein 5.9 (L) 6.4 - 8.2 g/dL   Calcium, ionized   Result Value Ref Range    POCT Calcium, Ionized 1.03 (L) 1.1 - 1.33 mmol/L   Coagulation Screen   Result Value Ref Range    Protime 14.7 (H) 9.8 - 12.8 seconds    INR 1.3 (H) 0.9 - 1.1    aPTT 52 (H) 27 - 38 seconds   ECMO VENOUS FULL PANEL    Result Value Ref Range    POCT pH, Venous ECMO 7.38 Reference range not established pH    POCT pCO2, Venous ECMO 45 Reference range not established mm Hg    POCT pO2,  Venous  ECMO 36 Reference range not established mm Hg    POCT SO2, Venous ECMO 64 Reference range not established %    POCT Oxy Hemoglobin, Venous ECMO 61.1 Reference range not established %    POCT Hematocrit Calculated, Venous ECMO 23.0 (L) 36.0 - 46.0 %    POCT Sodium, Venous ECMO 137 136 - 145 mmol/L    POCT Potassium, Venous ECMO 3.7 3.5 - 5.3 mmol/L    POCT Chloride, Venous ECMO 101 98 - 107 mmol/L    POCT Ionized Calcium, Venous ECMO 1.10 1.10 - 1.33 mmol/L    POCT Glucose, Venous ECMO 186 (H) 74 - 99 mg/dL    POCT Lactate Venous ECMO 1.1 0.4 - 2.0 mmol/L    POCT Base Excess, Venous ECMO 1.3 Reference range not established mmol/L    POCT HCO3 Calculated, Venous ECMO 26.6 Reference range not established mmol/L    POCT Hemoglobin, Venous ECMO 7.8 (L) 12.0 - 16.0 g/dL    POCT Anion Gap, Venous ECMO 13 Reference range not established mmo/L    Patient Temperature 37.0 degrees Celsius    FiO2 100 %   Basic Metabolic Panel   Result Value Ref Range    Glucose 177 (H) 74 - 99 mg/dL    Sodium 138 136 - 145 mmol/L    Potassium 3.8 3.5 - 5.3 mmol/L    Chloride 100 98 - 107 mmol/L    Bicarbonate 27 21 - 32 mmol/L    Anion Gap 15 10 - 20 mmol/L    Urea Nitrogen 14 6 - 23 mg/dL    Creatinine 0.70 0.50 - 1.05 mg/dL    eGFR >90 >60 mL/min/1.73m*2    Calcium 8.1 (L) 8.6 - 10.6 mg/dL   Phosphorus   Result Value Ref Range    Phosphorus 3.0 2.5 - 4.9 mg/dL   Blood Gas Arterial Full Panel   Result Value Ref Range    POCT pH, Arterial 7.41 7.38 - 7.42 pH    POCT pCO2, Arterial 42 38 - 42 mm Hg    POCT pO2, Arterial 315 (H) 85 - 95 mm Hg    POCT SO2, Arterial 100 94 - 100 %    POCT Oxy Hemoglobin, Arterial 96.5 94.0 - 98.0 %    POCT Hematocrit Calculated, Arterial 24.0 (L) 36.0 - 46.0 %    POCT Sodium, Arterial 136 136 - 145 mmol/L    POCT Potassium, Arterial 3.8  3.5 - 5.3 mmol/L    POCT Chloride, Arterial 101 98 - 107 mmol/L    POCT Ionized Calcium, Arterial 1.09 (L) 1.10 - 1.33 mmol/L    POCT Glucose, Arterial 191 (H) 74 - 99 mg/dL    POCT Lactate, Arterial 1.3 0.4 - 2.0 mmol/L    POCT Base Excess, Arterial 1.8 -2.0 - 3.0 mmol/L    POCT HCO3 Calculated, Arterial 26.6 (H) 22.0 - 26.0 mmol/L    POCT Hemoglobin, Arterial 8.1 (L) 12.0 - 16.0 g/dL    POCT Anion Gap, Arterial 12 10 - 25 mmo/L    Patient Temperature 37.0 degrees Celsius    FiO2 21 %   ECMO ARTERIAL FULL PANEL   Result Value Ref Range    POCT pH, Arterial ECMO 7.43 Reference range not established pH    POCT pCO2, Arterial ECMO 38 Reference range not established mm Hg    POCT pO2,  Arterial ECMO 449 Reference range not established mm Hg    POCT SO2, Arterial ECMO 100 Reference range not established %    POCT Oxy Hemoglobin, Arterial ECMO 97.2 Reference range not established %    POCT Hematocrit Calculated, Arterial ECMO 24.0 (L) 36.0 - 46.0 %    POCT Sodium, Arterial  ECMO 134 (L) 136 - 145 mmol/L    POCT Potassium, Arterial  ECMO 3.7 3.5 - 5.3 mmol/L    POCT Chloride, Arterial  ECMO 102 98 - 107 mmol/L    POCT Ionized Calcium, Arterial  ECMO 1.06 (L) 1.10 - 1.33 mmol/L    POCT Glucose, Arterial  ECMO 183 (H) 74 - 99 mg/dL    POCT Lactate Arterial  ECMO 1.2 0.4 - 2.0 mmol/L    POCT Base Excess, Arterial ECMO 0.9 Reference range not established mmol/L    POCT HCO3 Calculated, Arterial ECMO 25.2 Reference range not established mmol/L    POCT Hemoglobin, Arterial  ECMO 7.9 (L) 12.0 - 16.0 g/dL    POCT Anion Gap, Arterial  ECMO 11 Reference range not established mmo/L    Patient Temperature 37.0 degrees Celsius    FiO2 100 %   POCT GLUCOSE   Result Value Ref Range    POCT Glucose 205 (H) 74 - 99 mg/dL     *Note: Due to a large number of results and/or encounters for the requested time period, some results have not been displayed. A complete set of results can be found in Results Review.       Malnutrition Diagnosis  Status: Declining  Malnutrition Diagnosis: Moderate malnutrition related to acute disease or injury  As Evidenced by: pt's reported intake likely meeting <75% of estimated needs for at least 7 days, previous 5% weight loss in 1 month, LOS 42.  I agree with the dietitian's malnutrition diagnosis.      Assessment/Plan   Ms. Yimi Bowles is a 34F with a PMHx sig for stage D HFrEF (PPCM) s/p ICD s/p CardioMEMs, hypothyroidism 2/2 thyroidectomy, obesity s/p gastric bypass (2017), and SLE who is s/p OHT (3/31/2022) with a post-OHT course complicated by RIJ/RUE DVTs, leukopenia, left groin seroma, and asymptomatic 2R ACR (11/2022) treated with steroids, s/p total hysterectomy (2023), Flu A (1/2024).     Originally presented to [facility] ED on 2/15/24 with complaints of N/V x 3 days and inability to keep medications down. Found to have acute systolic heart failure with primary graft failure and cardiogenic shock. Treated for suspected acute cellular vs. acute antibody mediated rejection; however, multiple cardiac biopsies negative for pathology indicating rejection or other causes of graft failure. Course has been complicated by multiple MCS devices for refractory cardiogenic shock (right femoral IABP: 2/18/24 - 3/1/24, Left femoral VA ECMO: 2/19/24 - 2/29/24, Right axillary impella 5.5: 3/1/24 - remains in place, 2nd right femoral VA ECMO: 3/27/24 - remains in place), ARF requiring RRT, acute liver injury, intubation due to hypoxic respiratory failure, severe hemolysis 2/2 to impella malposition, mild CMV viremia, bilateral provoked IJ DVTs, multiple episodes of epistaxis & coagulopathies requiring ongoing blood product transfusions, & HCAP.       Transferred to CTICU on 3/27/24 following right femoral VA ECMO placement due to worsening cardiogenic shock and multi-organ failure despite Impella 5.5 support and multiple inotropes. Attempting for retransplantation of heart and new kidney transplantation.     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  #Acute renal failure requiring RRT   #Acute transaminitis  #Coagulopathy  #Malnutrition  #Thrombocytopenia   #Anemia   #Provoked bilateral IJ DVTs    #Left SFA occlusion   #HCAP   #Delirium/Anxiety     Donor/Recipient Infectious history:  CMV: -/+ (low grade CMV viremia w/ levels < 35 on 3/1/24, repeat CMV levels remain negative since 4/7/24)  Toxo: -/-   Hep C: -/-     Rejection/Prophylaxis (transplants):  - Steroids: Continue 10mg PO prednisone daily (risk for adrenal insufficiency if stopped)  - Tacrolimus: stopped on 4/9/24 due to infection   - Myfortic acid: stopped on 4/9/24 due to infection  - Antifungals: nystatin 500,000units Q6  - Antivirals: valcyte 450mg Q48hrs   - Anti PCP & Toxoplasmosis: bactrim 200-40mg daily      Last cardiac biopsy: 2/16/24 with ACR grade 1R and no AMR (extremely low C4d+ detected), 2/20/24 negative for ACR and AMR  Last HLA: 4/2/24 negative for class 1 or 2 antibodies   Last RHC: closing numbers on 3/16/24 /86(97) RA 20, PAP 40/33(37), C.O./C.I. 5.5/2.8, PVR 88, SVR 1115   Last LHC: 2/18/24 negative for CAV and CAD   Last TTE/BOB: 4/6/24 shows severe LVEF w/ global hypokinesis, severe RV dysfunction, mild-mod MR, mild TR and impella angled posteriorly   Osteopenia/osteoporosis prophylaxis: Vitamin D3 currently held. Continue calcium supplements  Peptic/gastric ulcer prophylaxis: Pantoprazole 40mg daily  CAV Prophylaxis: Holding Aspirin 81mg due to continued thrombocytopenia. Continue rosuvastatin daily.      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 Broad differential including SLE flair, giant cell vasculitis, CAV/CAD, viral cardiomyopathy, sarcoidosis, amyloidosis and allograft rejection amongst many others. 2xallograft biopsies on 2/16 & 2/20 remain negative for any significant pathology. Notably, both biopsies taken after rejection  therapies implemented which may have reduced areas of graft damage.    - DSAs remain negative; however, patient may have non-HLA antibodies present. 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   - Negative CAV & CAD via LHC on 2/18/24   - Completed methylpred steroid pulse w/ 1g Q24 x 3 days (2/16-2/18) and prednisone taper   - Thymoglobulin doses: 2/18 & 2/19   - IVIG + PLEX Session: 2/18, 2/20, 3/7, 3/11 and 3/13    - Right femoral VA ECMO flowing 2.7L w/ FIO2 90% and sweep 3  - Right axillary impella for LV venting remains in place and set P2 w/ flows of 0.8-1.0  - Impella remains poorly positioned and appears to be in contact with mitral valve leaflets. LDH remains elevated but stable.   - LFTs near normal s/p ECMO cannulation  - INR elevated in setting of malnutrition, now normalized following vitamin K x 3 (last dose 4/9/24)   - Intermittently delirious and anxious  - CT scan on 4/9/24 shows bilateral pulmonary infiltrates throughout lung fields, L>R, confirming HCAP    - Leukocytosis resolved. All culture data remains NGTD.   - Remains on broad spectrum antibiotics w/ IV vancomycin, micafungin and meropenem     Transplant Status:  - Was listed Status 1 for combined heart-kidney (listed in UNOS on 4/2/24); however, on 4/6 changed to UNOS Status 7 given development of new infection, coagulopathy, encephalopathy/delirium.  - ABO status: O+  - CMV+/EBV+/Toxo-/Hep B-/Hep C-  - Prospective cross match with every donor offer  - Due to potential risk of hyperacute allograft rejection, induction plan will be 500mg solumedrol x 2 (followed by steroid taper) and thymoglobulin       Recommendations:  - Repeat sputum culture if adequate sample able to be collected   - Follow up infectious workup (so far BCs and fungal labwork remain NGTD)   - Continue heparin purge through impella. Will restart systemic heparin tomorrow if no further bleeding episodes.   - Twan with on going discussions regarding impella removal w/  CT surgeons given lack of flow, poor placement, ongoing hemolysis and potential infectious risk. Likely not providing much LV venting.   - Would treat anemia conservatively, if hemodynamically stable with Hgb <7.0 then can hold off on further pRBC transfusions at this time.       Continue excellent care per CTICU team.      Patient was examined and discussed with Advanced Heart Failure and Transplant Cardiology attending physician, Dr. Pretty Toussaint.    Heart Transplant Team:   [employer] Secure Chat  [id number] during day shift  [id number] during night shift     Keith Jain, CNP

## 2024-04-10 NOTE — PROGRESS NOTES
Speech-Language Pathology  Adult Inpatient Clinical Bedside Swallow Evaluation    Patient Name: Charla Bowles  MRN: [id number]  Today's Date: 4/10/2024   Start Time: 1015  Stop Time: 1040  Time Calculation (min): 25    History of Present Illness:   Ms. Yimi Bowles is a 34F with a PMHx sig for stage D HFrEF (PPCM) s/p ICD s/p CardioMEMs, hypothyroidism 2/2 thyroidectomy, obesity s/p gastric bypass (2017), and SLE who is s/p OHT (3/31/2022) with a post-OHT course complicated by RIJ/RUE DVTs, leukopenia, left groin seroma, and asymptomatic 2R ACR (11/2022) treated with steroids, s/p total hysterectomy (2023), Flu A (1/2024).     Originally presented to [facility] ED on 2/15/24 with complaints of N/V x 3 days and inability to keep medications down. Found to have acute systolic heart failure with primary graft failure and cardiogenic shock. Treated for suspected acute cellular vs. acute antibody mediated rejection; however, multiple cardiac biopsies negative for pathology indicating rejection or other causes of graft failure. Course has been complicated by multiple MCS devices for refractory cardiogenic shock (right femoral IABP: 2/18/24 - 3/1/24, Left femoral VA ECMO: 2/19/24 - 2/29/24, Right axillary impella 5.5: 3/1/24 - remains in place, 2nd right femoral VA ECMO: 3/27/24 - remains in place), ARF requiring RRT, acute liver injury, intubation due to hypoxic respiratory failure, severe hemolysis 2/2 to impella malposition, mild CMV viremia, bilateral provoked IJ DVTs, multiple episodes of epistaxis & coagulopathies requiring ongoing blood product transfusions.        Transferred to [facility]ICU on 3/27/24 following right femoral VA ECMO placement due to worsening cardiogenic shock and multi-organ failure despite Impella 5.5 support and multiple inotropes. Now awaiting suitable organ donation as UNOS status 1 for heart-kidney transplantation (listed 4/3/24).     Assessment:   Clinical bedside swallow evaluation completed. Pt  intubated/extubated x2 for 4 days and x1 for 1 day. Recently extubated 3/31. Due to ECMO and pt's comfort difficult to optimally position upright at 90 degrees. Pt endored coughing intermittently due to her pneumonia as well as a poor appetite.  Pt A&O x 4. Adequate oral motor musculature. Pt. tolerated ice chips and sips of water. Pt coughed x1 upon swallowing of sequential sips. Consumed 3 oz of water in continuous sequential swallows without further coughing or s/s of aspiration. Adequate manipulation of puree and adequate mastication of small bites of solids with full oral clearance. Pt demonstrated coughing upon swallowing of solids and question if coughing is due to existing pneumonia vs. s/s of aspiration. SLP will continue to follow and re-assess swallowing once pneumonia is cleared to ascertain readiness to return to a PO diet vs instrumental exam. Discussed with RN and MD results and recommendations.      Recommendations:  NPO    -Frequent, aggressive oral care is strongly recommended to improve infection control as well as reduce dental plaque and bacteria on oropharyngeal surfaces which may increase the risk nosocomial infections, including pneumonia.    -OK for small amounts of ice chips (one at a time, 10x/hour) and sips of water for pleasure/swallow stimulation, but only after aggressive oral care to avoid colonization of bacteria within oral cavity.    Goal:   Pt. will tolerate least restrictive diet without overt s/s of aspiration with 100% accuracy.         Plan:  SLP Services Indicated: Yes  Frequency: Other 1x  Discussed POC with patient  SLP - OK to Discharge    Pain:   0-10  0 = No pain.     Inpatient Education:  Extensive education provided to patient regarding current swallow function, recommendations/results, and POC.      Consultations/Referrals/Coordination of Services:   N/A    Cass Clark, SLP Student    Supervising SLP was present, actively participated, and made all clinical  decisions during session.  Patrica Barbosa M.S.CCC/SLP-Secure chat

## 2024-04-10 NOTE — PROGRESS NOTES
"Charla Bowles is a 34 y.o. female on day 55 of admission presenting with Cardiogenic shock (CMS/HCC).    Subjective   Slept well overnight.        Objective     Physical Exam    Last Recorded Vitals  Blood pressure 100/82, pulse 110, temperature 36.8 °C (98.2 °F), temperature source Temporal, resp. rate (!) 28, height 1.549 m (5' 0.98\"), weight 84 kg (185 lb 3 oz), SpO2 100%.  Intake/Output last 3 Shifts:  I/O last 3 completed shifts:  In: 2603.2 (31 mL/kg) [I.V.:533.2 (6.3 mL/kg); Blood:350; NG/GT:1170; IV Piggyback:550]  Out: 3234 (38.5 mL/kg) [Other:3232; Stool:2]  Weight: 84 kg     Relevant Results                    Malnutrition Diagnosis Status: Declining  Malnutrition Diagnosis: Moderate malnutrition related to acute disease or injury  As Evidenced by: pt's reported intake likely meeting <75% of estimated needs for at least 7 days, previous 5% weight loss in 1 month, LOS 42.  I agree with the dietitian's malnutrition diagnosis.      Assessment/Plan   Principal Problem:    Cardiogenic shock (CMS/HCC)  Active Problems:    Heart transplant recipient (CMS/HCC)    ESRD (end stage renal disease) on dialysis (CMS/HCC)    Immunosuppression (CMS/HCC)    HIRAM (acute kidney injury) (CMS/HCC)    Acute passive congestion of liver    Hyponatremia    Iron deficiency anemia    Abdominal pain    Systolic congestive heart failure (CMS/HCC)    History of left ventricular assist device (CMS/HCC)    History of extracorporeal membrane oxygenation treatment    Moderate protein-calorie malnutrition (CMS/HCC)    Heart transplant as cause of abnormal reaction or later complication (CMS/HCC)    Cardiac transplant rejection (CMS/HCC)    Acute combined systolic (congestive) and diastolic (congestive) heart failure (CMS/HCC)    Patient seen, evaluated, and discussed with the NILA.  I have personally obtained key components of the history and physical and have performed my own medical decision making.      34 year old female with " history of cardiac transplant in March 2022 for heart failure related to peripartum cardiomyopathy with subsequent rejection and cardiogenic shock requiring mechanical circulatory support with Impella and now VA ECMO. Patient re-listed for heart transplant and kidney transplant in setting of cardiogenic shock and acute renal failure; however, team made decision to move her to status 7 given concern for infection.      Neuro: More awake today and less confused, oriented x3 and conversational on rounds but continues to be intermittently CAM+. Continue PT/OT, out of bed. Pain control. Encourage sleep and REST protocol. Will continue seroquel, melatonin at night. Dexmedetomidine at night to encourage sleep. Adjunct therapies if wanted to normalize environment. Will discuss music, art, and pet therapy to keep her engaged during the day.   CV: Heart transplant, complicated by rejection now with cardiogenic shock requiring ECMO and Impella. Continue full support on ECMO, Impella to P2 with 1L flow, stable LDH. ECMO flows 3.2L 3400 RPM and add heparin through Impella purge. Currently keeping Impella after discussion with multidisciplinary team.   Pulm: Continued tachypnea requiring nasal cannula today. ECMO sweep increased for respiratory acidosis. CT scan concerning for pneumonia, L > R. Will attempt to induce a sputum sample for culture. Increase bronchial hygiene as poor IS effort.    : Acute kidney injury, currently on CRRT - continue. Appreciate Renal consult. Optimize electrolytes. Goal even to negative if tolerated with goal CVP 10   GI: Bowel regimen. Continue PPI. Poor appetite and intake, continue TF today.   Heme: History of DVT with acute thromboses, including clots visualized in bilateral Ijs; US consistent with prior findings. Coagulopathy improved today after vitamin K, supporting nutritional coagulopathy. No current signs of bleeding. Start SQH and heparin through Impella purge today. Consider transition  to systemic heparin tomorrow if remains stable tonight on purge dosing. Maintain active type and screen.   ID: Improved leukocytosis today. Awaiting results from sputum culture, urine culture, negative blood cultures to date, C diff negative. Continue coverage with meropenem with vancomycin, and micafungin; prophylactic Bactrim. ID following. Stopped myfortic and tacrolimus after multidisciplinary discussion.  Endo: Continue levothyroxine, recheck thyroid studies 4/16. SSI for glucose control.   Immunosuppression: Continue steroids, prophylaxis per transplant.      Lines: No PIV access currently. PICC team unable to visualize targets due to cephalic clots and infiltration edema. RIJ trialysis catheter placed 4/6.       Dispo: Continue CTICU care. Discussed with HF attending Dr. Toussaint, Dr. Witt, and Dr. Marina on rounds. Patient and family updated at bedside.     This critically ill patient continues to be at risk for clinically significant deterioration / failure due to the above mentioned dysfunctional, unstable organ systems.  I have personally identified and managed all complex critical care issues to prevent aforementioned clinical deterioration.  Critical care time is spent at bedside and/or the immediate area and has included, but is not limited to, the review of diagnostic tests, labs, radiographs, serial assessments of hemodynamics, respiratory status, ventilatory management, review of consult team recommendations, and family updates.  Time spent in procedures and teaching are reported separately. Critical Care Time: 42 minutes.        I spent 42 minutes in the professional and overall care of this patient.      Karley Dhillon MD

## 2024-04-10 NOTE — PROGRESS NOTES
Occupational Therapy    Occupational Therapy Treatment    Name: Charla Bowles  MRN: [id number]  : 1990  Date: 04/10/24  Time Calculation  Start Time: 1408  Stop Time: 1446  Time Calculation (min): 38 min    Assessment:  End of Session Communication: Bedside nurse (NP)  End of Session Patient Position: Bed, 3 rail up, Alarm off, not on at start of session    Plan:  Treatment Interventions: ADL retraining, Functional transfer training, Endurance training, UE strengthening/ROM, Cognitive reorientation, Patient/family training, Equipment evaluation/education, Neuromuscular reeducation, Fine motor coordination activities, Compensatory technique education  OT Frequency: 5 times per week  OT Discharge Recommendations: High intensity level of continued care  Equipment Recommended upon Discharge:  (TBD)  OT Recommended Transfer Status: Assist of 2  OT - OK to Discharge: Yes    Subjective     General:  OT Last Visit  OT Received On: 04/10/24  Co-Treatment: PT  Co-Treatment Reason: Pt on ECMO requiring skilled 2 person assist for safe mobility  Prior to Session Communication: Bedside nurse (NP)  Patient Position Received: Bed, 3 rail up, Alarm off, not on at start of session  Family/Caregiver Present: No  General Comment: Pt supine in bed upon arrival. Agreeable and highly motivated. R femoral ECMO flow 3.15 (post: 3.22), 100% FiO2, sweep 3, R axillary impella (P2) flow 1.2 (post 1.5), CVVH through ECMO. ECMO and impella examined pre, during and post mobility, stable and secure.     Precautions:  Hearing/Visual Limitations: WFL  Medical Precautions: Cardiac precautions, Fall precautions  Precautions Comment: R axillary impella precautions- no pushing/pulling with R UE, No lifting R UE above shoulder height, no R UE humeral EXT past plane of body, R femoral ECMO precautions, MAP 70-90, protective precautions    Vitals:  Vital Signs  Heart Rate: (!) 128 (post: 123)  Resp: (!) 36 (post: 37)  SpO2: 97 % (post:  100)  BP: 100/82 (post: 92/73)  MAP (mmHg): 79    Lines/Tubes/Drains:  ECMO Cannulation Peripheral Right;Femoral artery;Femoral vein (Active)   Number of days: 13       Arterial Line 03/27/24 Right Radial (Active)   Number of days: 13       Ventricular Assist Device Impella 5.5 (Active)   Number of days: 39       NG/OG/Feeding Tube Nasojejunal Left nostril (Active)   Number of days: 2       Rectal Tube With balloon (Active)   Number of days: 0       Hemodialysis Cath 04/06/24 Triple lumen Right Non-tunneled catheter Jugular (Active)   Number of days: 4       Cognition:  Overall Cognitive Status: Within Functional Limits  Arousal/Alertness: Delayed responses to stimuli  Orientation Level: Oriented X4  Cognition Comments: Cognitive status significantly improved this date.    Pain Assessment:  Pain Assessment  Pain Assessment: 0-10  Pain Score: 0 - No pain     Objective      Bed Mobility/Transfers:     Bed Mobility  Bed Mobility: Yes  Bed Mobility 1  Bed Mobility 1: Supine to sitting  Level of Assistance 1: Dependent (x2)  Bed Mobility Comments 1: HOB elevated, draw sheet  Bed Mobility 2  Bed Mobility  2: Sitting to supine  Level of Assistance 2: Dependent (x2)  Bed Mobility Comments 2: draw sheet  Bed Mobility 3  Bed Mobility 3: Rolling left  Level of Assistance 3: Dependent    Transfers  Transfer: Yes  Transfer 1  Transfer From 1: Sit to  Transfer to 1: Stand  Transfer Level of Assistance 1: Maximum assistance (x2 assist)  Trials/Comments 1: x1 trial, draw sheet under hips, bilat knees blocked       Therapy/Activity:   Therapeutic Exercise  Therapeutic Exercise Performed: Yes  Therapeutic Exercise Activity 1: LUE shoulder flexion/extension 1 set 10 repetitions with mod A.  Therapeutic Exercise Activity 2: 10 breaths incentive spirometer with rest breaks as needed and cues for pacing. achieved 500 mL for 5/10 trials.  Therapeutic Exercise Activity 3: OT provided min A for sitting balance while PT assisted with peddler  at EOB.  Therapeutic Activity  Therapeutic Activity Performed: Yes  Therapeutic Activity 1: Pt sat EOB 20 minutes with SBA-occasional CGA  for sitting balance  Therapeutic Activity 2: Pt tolerated ~2-3 minutes stand trial this date. Primairly min A x2, mod A x2 for last ~30 seconds with right sided lean. Pt tolerated lateral weight shifts right and left in standing.        Cognitive Skill Development:  Cognitive Skill Development  Cognitive Skill Development Activity 1: Pt expressed interest in emotional health invention prior to mobility for future sessions.    Outcome Measures:  [facility] Daily Activity  Putting on and taking off regular lower body clothing: A lot  Bathing (including washing, rinsing, drying): A lot  Putting on and taking off regular upper body clothing: A lot  Toileting, which includes using toilet, bedpan or urinal: Total  Taking care of personal grooming such as brushing teeth: A little  Eating Meals: A little  Daily Activity - Total Score: 13     Education Documentation  Body Mechanics, taught by Marcia Thomason OT at 4/10/2024  2:59 PM.  Learner: Patient  Readiness: Acceptance  Method: Explanation, Demonstration  Response: Verbalizes Understanding    Precautions, taught by Marcia Thomason OT at 4/10/2024  2:59 PM.  Learner: Patient  Readiness: Acceptance  Method: Explanation, Demonstration  Response: Verbalizes Understanding    Home Exercise Program, taught by Marcia Thomason OT at 4/10/2024  2:59 PM.  Learner: Patient  Readiness: Acceptance  Method: Explanation, Demonstration  Response: Verbalizes Understanding    Education Comments  No comments found.        Goals:  Encounter Problems       Encounter Problems (Active)       ADLs       Patient will complete daily grooming tasks in standing with LRAD with supervision level of assistance and PRN adaptive equipment. (Progressing)       Start:  03/01/24    Expected End:  04/16/24               ADLs       Patient with  complete lower body dressing with stand by assist level of assistance donning and doffing all LE clothes  with PRN adaptive equipment (Not met)       Start:  03/04/24    Expected End:  03/18/24    Resolved:  04/02/24    Updated to: Patient with complete UB bathing with stand by assist level of assistance  with PRN adaptive equipment    Update reason: re eval          Patient will complete toileting including hygiene clothing management/hygiene with stand by assist level of assistance. (Not met)       Start:  03/04/24    Expected End:  03/18/24    Resolved:  04/02/24    Updated to: Patient will complete upper body dressing including with mod I level of assistance.    Update reason: re eval         Patient with complete UB bathing with stand by assist level of assistance  with PRN adaptive equipment (Progressing)       Start:  04/02/24    Expected End:  04/16/24                Patient will complete upper body dressing including with mod I level of assistance. (Progressing)       Start:  04/02/24    Expected End:  04/16/24                   BALANCE       Pt will increase dynamic standing tolerance to >10 min with supervision using LRAD during functional mobility/ADLs without LOB in order to improve activity tolerance and balance for self-care tasks.  (Progressing)       Start:  03/01/24    Expected End:  04/16/24               COGNITION/SAFETY       Patient will participate in cognitive activities to demonstrate WFL score on further cognitive assessments, including Medi-Cog, MoCA and remain A&O x3, CAM (-).  (Progressing)       Start:  03/01/24    Expected End:  04/16/24               EXERCISE/STRENGTHENING       Patient will be educated on BUE HEP for increased ADL/IADL performance. (Progressing)       Start:  03/01/24    Expected End:  04/16/24               MOBILITY       Patient will perform Functional mobility max Household distances/Community Distances with supervision level of assistance and least restrictive  device in order to improve safety and functional mobility. (Progressing)       Start:  03/01/24    Expected End:  04/16/24                     04/10/24 at 3:00 PM   Marcia Thomason, OT   [phone]

## 2024-04-10 NOTE — CARE PLAN
Problem: Pain  Goal: Takes deep breaths with improved pain control throughout the shift  Outcome: Progressing  Goal: Turns in bed with improved pain control throughout the shift  Outcome: Progressing     Problem: Skin  Goal: Decreased wound size/increased tissue granulation at next dressing change  Outcome: Progressing  Goal: Participates in plan/prevention/treatment measures  Outcome: Progressing     Problem: Chronic Conditions and Co-morbidities  Goal: Patient's chronic conditions and co-morbidity symptoms are monitored and maintained or improved  Outcome: Progressing     Problem: Fall/Injury  Goal: Not fall by end of shift  Outcome: Progressing  Goal: Be free from injury by end of the shift  Outcome: Progressing  Goal: Verbalize understanding of personal risk factors for fall in the hospital  Outcome: Progressing

## 2024-04-10 NOTE — PROGRESS NOTES
Physical Therapy    Physical Therapy Treatment    Patient Name: Charla Bowles  MRN: [id number]  Today's Date: 4/10/2024  Time Calculation  Start Time: 1408  Stop Time: 1446  Time Calculation (min): 38 min       Assessment/Plan   PT Assessment  PT Assessment Results: Decreased strength, Decreased endurance, Impaired balance, Decreased mobility  Rehab Prognosis: Good  Evaluation/Treatment Tolerance: Patient tolerated treatment well  Medical Staff Made Aware: Yes  End of Session Communication: Bedside nurse, Physician  End of Session Patient Position: Bed, 3 rail up, Alarm off, not on at start of session  PT Plan  Inpatient/Swing Bed or Outpatient: Inpatient  PT Plan  Treatment/Interventions: Bed mobility, Transfer training, Gait training, Balance training, Neuromuscular re-education, Strengthening, Endurance training, Therapeutic exercise, Therapeutic activity  PT Plan: Skilled PT  PT Frequency: 5 times per week  PT Discharge Recommendations: High intensity level of continued care  PT Recommended Transfer Status: Assist x2  PT - OK to Discharge: Yes      General Visit Information:   PT  Visit  PT Received On: 04/10/24  Response to Previous Treatment: Patient with no complaints from previous session.  General  Family/Caregiver Present: No  Co-Treatment: OT  Co-Treatment Reason: Pt on ECMO requiring skilled 2 person assist for safe mobility  Prior to Session Communication: Physician, Bedside nurse, PCT/NA/CTA  Patient Position Received: Bed, 3 rail up, Alarm off, not on at start of session  Preferred Learning Style: auditory, verbal  General Comment: Pt awake, alert and willing to participate in therapy session. Assisted pt with bed mobility, sat EOB ~23 min and completed sit<>stand transfer with static standing ~3 min.  VA ECMO flow 3.13/3.24, sweep 3, 100% FiO2. R axillary impella P2, flow-1.2. Sites examined pre, during and post mobility. Stable and secure. (OT/PT, RN and CNP present during  session.)    Subjective   Precautions:  Precautions  Medical Precautions: Cardiac precautions, Fall precautions  Precautions Comment: R axillary impella precautions- no pushing/pulling with R UE, No lifting R UE above shoulder height, no R UE humeral EXT past plane of body, R femoral ECMO precautions, MAP 70-90, protective precautions  Vital Signs:  Vital Signs  Heart Rate: (!) 128 (Post: 123)  Heart Rate Source: Monitor  Resp: (!) 45 (Post; 35)  SpO2: 99 % (Post: 100)  BP: 98/82 (Post: 94/71)  MAP (mmHg): 88 (Post: 78)  BP Location: Right arm  BP Method: Arterial line  Patient Position: Lying    Objective   Pain:  Pain Assessment  Pain Assessment: 0-10  Pain Score: 0 - No pain  Cognition:  Cognition  Overall Cognitive Status: Within Functional Limits  Arousal/Alertness: Delayed responses to stimuli  Orientation Level: Oriented X4  Following Commands: Follows one step commands with increased time  Activity Tolerance:  Activity Tolerance  Endurance: Tolerates 30 min exercise with multiple rests  Early Mobility/Exercise Safety Screen: Proceed with mobilization - No exclusion criteria met  Treatments:  Therapeutic Exercise  Therapeutic Exercise Performed: Yes  Therapeutic Exercise Activity 1: 1x5 seated knee flex/ext  Therapeutic Exercise Activity 2: 1x10 breaths with use of flutter device in sitting  Therapeutic Exercise Activity 3: 8 revolutions on peddler with BLEs - maxA provided from therapist to complete, height of bed elevated to ensure limited R hip flexion.  10 revolutions on peddler with LLE - maxA to complete    Therapeutic Activity  Therapeutic Activity Performed: Yes  Therapeutic Activity 1: Increased time for skilled ICU line/tube management for safe functional mobility  Therapeutic Activity 2: Pt sat EOB ~23 min with min-CGA.  Increased assistance with dynamic peddling.  Good seated posture with stable ECMO flows.  Therapeutic Activity 3: PT tolerated ~3 min static stand with minAx2 throughout majority  of standing.  Towards end of standing bout, pt requiring modAx2 d/t R lateral lean 2/2 fatigue.  Pt demonstrating good posture and strength to maintain position.  Vitals and ECMO numbers stable.    Bed Mobility  Bed Mobility: Yes  Bed Mobility 1  Bed Mobility 1: Supine to sitting  Level of Assistance 1: Maximum assistance (x2)  Bed Mobility Comments 1: HOB elevated prior to OOB transfer, draw sheet utilized, cued for pt to reach for bed rail to assist  Bed Mobility 2  Bed Mobility  2: Sitting to supine  Level of Assistance 2: Dependent (x2)  Bed Mobility Comments 2: HOB flat, draw sheet utilized  Bed Mobility 3  Bed Mobility 3:  (Boost towards HOB)  Level of Assistance 3: Dependent (x2)  Bed Mobility Comments 3: HOB flat, draw sheet utilized    Ambulation/Gait Training  Ambulation/Gait Training Performed: No  Transfers  Transfer: Yes  Transfer 1  Transfer From 1: Sit to, Stand to  Transfer to 1: Sit, Stand  Technique 1: Sit to stand, Stand to sit  Transfer Device 1:  (B arm in arm)  Transfer Level of Assistance 1: Maximum assistance (x2)  Trials/Comments 1: x1 trial from EOB, draw sheet utilized (Pt deferred additional stand this date.)    Stairs  Stairs: No    Outcome Measures:  [facility] Basic Mobility  Turning from your back to your side while in a flat bed without using bedrails: A lot  Moving from lying on your back to sitting on the side of a flat bed without using bedrails: A lot  Moving to and from bed to chair (including a wheelchair): Total  Standing up from a chair using your arms (e.g. wheelchair or bedside chair): A lot  To walk in hospital room: Total  Climbing 3-5 steps with railing: Total  Basic Mobility - Total Score: 9    FSS-ICU  Ambulation: Unable to attempt due to weakness  Rolling: Maximal assistance (performs 25% - 49% of task)  Sitting: Supervision or set-up only  Transfer Sit-to-Stand: Maximal assistance (performs 25% - 49% of task)  Transfer Supine-to-Sit: Total assistance (performs 25% or  requires another person)  Total Score: 10    Education Documentation  Precautions, taught by Michelle Lee PT at 4/10/2024  3:30 PM.  Learner: Patient  Readiness: Acceptance  Method: Explanation  Response: Verbalizes Understanding, Demonstrated Understanding    Body Mechanics, taught by Michelle Lee PT at 4/10/2024  3:30 PM.  Learner: Patient  Readiness: Acceptance  Method: Explanation  Response: Verbalizes Understanding, Demonstrated Understanding    Mobility Training, taught by Michelle Lee PT at 4/10/2024  3:30 PM.  Learner: Patient  Readiness: Acceptance  Method: Explanation  Response: Verbalizes Understanding, Demonstrated Understanding    Education Comments  No comments found.    EDUCATION:       Encounter Problems       Encounter Problems (Active)       Balance       Pt will demonstrate ability to complete sitting static/dynamic balance activities with unilateral UE support and no LOB for increase in safety up D/C.  (Progressing)       Start:  04/02/24    Expected End:  04/23/24               Mobility       Pt will demonstrated ability to complete 5 lateral side steps with modAx1, LRAD, proper form and no balance deficits for safe DC. (Progressing)       Start:  04/02/24    Expected End:  04/23/24            Pt will demonstrate ability to complete ther ex activities to improve functional strength for increase in independence upon DC.  (Progressing)       Start:  04/02/24    Expected End:  04/23/24               PT Transfers       Pt will demonstrated ability to complete bed mobility and sit<>stand transfers with mod assistance x2 and use of assistive device to safely DC. (Progressing)       Start:  04/02/24    Expected End:  04/23/24

## 2024-04-10 NOTE — PROGRESS NOTES
"        INPATIENT TRANSPLANT NEPHROLOGY PROGRESS NOTE          REASON FOR CONSULT: CRRT mgt    SUBJECTION:  [facility] Procedure note  Pt alert this morning. SOB improving.     PHYCISCAL EXAMINATION:    Visit Vitals  BP 94/83 Comment: post: 102/92   Pulse (!) 123   Temp 36.9 °C (98.4 °F) (Temporal)   Resp (!) 51   Ht 1.549 m (5' 0.98\")   Wt 84 kg (185 lb 3 oz)   SpO2 96%   BMI 35.01 kg/m²   OB Status Hysterectomy   Smoking Status Never   BSA 1.9 m²        04/08 1900 - 04/10 0659  In: 2603.2 [I.V.:533.2]  Out: 3234      Weight change:       Pulmonary:      Effort: Pulmonary effort is normal. No respiratory distress.      Breath sounds: Normal breath sounds.   Chest:      Comments: Right axillary impella site CDI   Abdominal:      General: Bowel sounds are normal.      Palpations: Abdomen is soft.      Tenderness: There is no abdominal tenderness.   Musculoskeletal:      -No LE edema     General: Skin is warm and dry.      Comments: Left groin ECMO cannulation site with drainage    Neurological:   A&OX3    MEDICATION LIST: REVIEWED    acetaminophen, 650 mg, q6h  bisacodyl, 10 mg, Once  calcium carbonate-vitamin D3, 1 tablet, Daily  cyanocobalamin, 500 mcg, Daily  darbepoetin floyd, 100 mcg, q14 days  ferrous sulfate (325 mg ferrous sulfate), 65 mg of iron, Daily with breakfast  folic acid, 1 mg, Daily  heparin (porcine), 5,000 Units, q8h  insulin lispro, 0-5 Units, TID with meals  levothyroxine, 250 mcg, Daily  lidocaine, 1 patch, Daily  melatonin, 10 mg, Daily  meropenem, 2 g, q12h  micafungin, 100 mg, q24h  multivitamin with minerals, 1 tablet, Daily  nystatin, 5 mL, q6h  oxygen, , Continuous - Inhalation  pantoprazole, 40 mg, Daily  predniSONE, 10 mg, Daily  [Held by provider] psyllium, 1 packet, Daily  QUEtiapine, 50 mg, Nightly  rosuvastatin, 10 mg, Nightly  [Held by provider] sennosides-docusate sodium, 1 tablet, BID  sod phos di, mono-K phos mono, 500 mg, 4x daily  sodium chloride, 1 spray, 4x " daily  sulfamethoxazole-trimethoprim, 80 mg of trimethoprim, Daily  valGANciclovir, 450 mg, q48h      dexmedeTOMIDine, Last Rate: Stopped (04/10/24 0700)  heparin Impella Purge 25 units/mL in 500 mL D5W, Last Rate: 10 mL/hr (04/10/24 1100)  lactated Ringer's, Last Rate: 5 mL/hr (04/10/24 1100)  PrismaSol 4/2.5, Last Rate: 2,400 mL/hr (04/09/24 1535)  [Held by provider] sodium bicarbonate infusion Impella Purge 25 mEq/1000 mL D5W, Last Rate: Stopped (04/09/24 1200)      alteplase, 2 mg, PRN  bisacodyl, 10 mg, Daily PRN  calcium gluconate, 1 g, q6h PRN  calcium gluconate, 2 g, q6h PRN  dextrose, 25 g, q15 min PRN  dextrose, 25 g, q15 min PRN   Or  glucagon, 1 mg, q15 min PRN  hydrALAZINE, 5 mg, q4h PRN  hydrOXYzine HCL, 25 mg, q6h PRN  ipratropium-albuteroL, 3 mL, q6h PRN  artificial tears, 2 drop, PRN  magnesium sulfate, 2 g, q6h PRN  magnesium sulfate, 4 g, q6h PRN  microfibrllar collagen, , PRN  mupirocin, , BID PRN  naloxone, 0.2 mg, q5 min PRN  ondansetron, 4 mg, q4h PRN  oxyCODONE, 5 mg, q6h PRN  sodium chloride, 1 spray, 4x daily PRN  vancomycin, , Daily PRN        ALLERGY:  Allergies   Allergen Reactions    Dapagliflozin GI bleeding and Bleeding     UTI    Urinary tract infection    Empagliflozin Unknown    Myfortic [Mycophenolate Sodium] GI Upset    Topiramate Nausea Only and Nausea/vomiting    Latex Rash       LABS:  Results for orders placed or performed during the hospital encounter of 02/15/24 (from the past 24 hour(s))   POCT GLUCOSE   Result Value Ref Range    POCT Glucose 209 (H) 74 - 99 mg/dL   C. difficile, PCR    Specimen: Stool   Result Value Ref Range    C. difficile, PCR Not Detected Not Detected   Renal Function Panel   Result Value Ref Range    Glucose 167 (H) 74 - 99 mg/dL    Sodium 136 136 - 145 mmol/L    Potassium 4.0 3.5 - 5.3 mmol/L    Chloride 98 98 - 107 mmol/L    Bicarbonate 23 21 - 32 mmol/L    Anion Gap 19 10 - 20 mmol/L    Urea Nitrogen 14 6 - 23 mg/dL    Creatinine 0.73 0.50 - 1.05  mg/dL    eGFR >90 >60 mL/min/1.73m*2    Calcium 8.4 (L) 8.6 - 10.6 mg/dL    Phosphorus 1.9 (L) 2.5 - 4.9 mg/dL    Albumin 3.8 3.4 - 5.0 g/dL   CBC   Result Value Ref Range    WBC 13.0 (H) 4.4 - 11.3 x10*3/uL    nRBC 5.5 (H) 0.0 - 0.0 /100 WBCs    RBC 2.67 (L) 4.00 - 5.20 x10*6/uL    Hemoglobin 7.8 (L) 12.0 - 16.0 g/dL    Hematocrit 23.9 (L) 36.0 - 46.0 %    MCV 90 80 - 100 fL    MCH 29.2 26.0 - 34.0 pg    MCHC 32.6 32.0 - 36.0 g/dL    RDW 19.8 (H) 11.5 - 14.5 %    Platelets 122 (L) 150 - 450 x10*3/uL   Calcium, ionized   Result Value Ref Range    POCT Calcium, Ionized 1.07 (L) 1.1 - 1.33 mmol/L   Respiratory Culture/Smear    Specimen: SPUTUM; Fluid   Result Value Ref Range    Respiratory Culture/Smear (A)      Culture not performed. See Gram stain findings. Recollect if clinically indicated.    Gram Stain (A)      Gram stain indicates specimen contains significant salivary contamination.    Gram Stain (A)      A specimen of better quality should be submitted if clinically indicated.   Heparin Assay, UFH   Result Value Ref Range    Heparin Unfractionated 0.1 See Comment Below for Therapeutic Ranges IU/mL   POCT GLUCOSE   Result Value Ref Range    POCT Glucose 204 (H) 74 - 99 mg/dL   Vancomycin   Result Value Ref Range    Vancomycin 27.3 (H) 5.0 - 20.0 ug/mL   Blood Gas Arterial Full Panel   Result Value Ref Range    POCT pH, Arterial 7.38 7.38 - 7.42 pH    POCT pCO2, Arterial 38 38 - 42 mm Hg    POCT pO2, Arterial 276 (H) 85 - 95 mm Hg    POCT SO2, Arterial 100 94 - 100 %    POCT Oxy Hemoglobin, Arterial 96.6 94.0 - 98.0 %    POCT Hematocrit Calculated, Arterial 25.0 (L) 36.0 - 46.0 %    POCT Sodium, Arterial 134 (L) 136 - 145 mmol/L    POCT Potassium, Arterial 4.1 3.5 - 5.3 mmol/L    POCT Chloride, Arterial 101 98 - 107 mmol/L    POCT Ionized Calcium, Arterial 1.02 (L) 1.10 - 1.33 mmol/L    POCT Glucose, Arterial 164 (H) 74 - 99 mg/dL    POCT Lactate, Arterial 3.1 (H) 0.4 - 2.0 mmol/L    POCT Base Excess, Arterial  -2.4 (L) -2.0 - 3.0 mmol/L    POCT HCO3 Calculated, Arterial 22.5 22.0 - 26.0 mmol/L    POCT Hemoglobin, Arterial 8.2 (L) 12.0 - 16.0 g/dL    POCT Anion Gap, Arterial 15 10 - 25 mmo/L    Patient Temperature 37.0 degrees Celsius    FiO2 21 %   Tacrolimus level   Result Value Ref Range    Tacrolimus  5.5 <=15.0 ng/mL   Reticulocytes   Result Value Ref Range    Retic % 3.7 (H) 0.5 - 2.0 %    Retic Absolute 0.095 (H) 0.018 - 0.083 x10*6/uL    Reticulocyte Hemoglobin 26 (L) 28 - 38 pg    Immature Retic fraction 34.1 (H) <=16.0 %   CBC and Auto Differential   Result Value Ref Range    WBC 10.9 4.4 - 11.3 x10*3/uL    nRBC 3.7 (H) 0.0 - 0.0 /100 WBCs    RBC 2.59 (L) 4.00 - 5.20 x10*6/uL    Hemoglobin 7.5 (L) 12.0 - 16.0 g/dL    Hematocrit 23.2 (L) 36.0 - 46.0 %    MCV 90 80 - 100 fL    MCH 29.0 26.0 - 34.0 pg    MCHC 32.3 32.0 - 36.0 g/dL    RDW 20.0 (H) 11.5 - 14.5 %    Platelets 108 (L) 150 - 450 x10*3/uL    Neutrophils % 83.7 40.0 - 80.0 %    Immature Granulocytes %, Automated 4.2 (H) 0.0 - 0.9 %    Lymphocytes % 3.7 13.0 - 44.0 %    Monocytes % 7.5 2.0 - 10.0 %    Eosinophils % 0.8 0.0 - 6.0 %    Basophils % 0.1 0.0 - 2.0 %    Neutrophils Absolute 9.13 (H) 1.20 - 7.70 x10*3/uL    Immature Granulocytes Absolute, Automated 0.46 0.00 - 0.70 x10*3/uL    Lymphocytes Absolute 0.40 (L) 1.20 - 4.80 x10*3/uL    Monocytes Absolute 0.82 0.10 - 1.00 x10*3/uL    Eosinophils Absolute 0.09 0.00 - 0.70 x10*3/uL    Basophils Absolute 0.01 0.00 - 0.10 x10*3/uL   Lactate Dehydrogenase   Result Value Ref Range    LDH 1,433 (H) 84 - 246 U/L   Hepatic function panel   Result Value Ref Range    Albumin 3.8 3.4 - 5.0 g/dL    Bilirubin, Total 2.1 (H) 0.0 - 1.2 mg/dL    Bilirubin, Direct 1.0 (H) 0.0 - 0.3 mg/dL    Alkaline Phosphatase 286 (H) 33 - 110 U/L    ALT 24 7 - 45 U/L    AST 54 (H) 9 - 39 U/L    Total Protein 5.9 (L) 6.4 - 8.2 g/dL   Calcium, ionized   Result Value Ref Range    POCT Calcium, Ionized 1.03 (L) 1.1 - 1.33 mmol/L    Coagulation Screen   Result Value Ref Range    Protime 14.7 (H) 9.8 - 12.8 seconds    INR 1.3 (H) 0.9 - 1.1    aPTT 52 (H) 27 - 38 seconds   ECMO VENOUS FULL PANEL   Result Value Ref Range    POCT pH, Venous ECMO 7.38 Reference range not established pH    POCT pCO2, Venous ECMO 45 Reference range not established mm Hg    POCT pO2,  Venous  ECMO 36 Reference range not established mm Hg    POCT SO2, Venous ECMO 64 Reference range not established %    POCT Oxy Hemoglobin, Venous ECMO 61.1 Reference range not established %    POCT Hematocrit Calculated, Venous ECMO 23.0 (L) 36.0 - 46.0 %    POCT Sodium, Venous ECMO 137 136 - 145 mmol/L    POCT Potassium, Venous ECMO 3.7 3.5 - 5.3 mmol/L    POCT Chloride, Venous ECMO 101 98 - 107 mmol/L    POCT Ionized Calcium, Venous ECMO 1.10 1.10 - 1.33 mmol/L    POCT Glucose, Venous ECMO 186 (H) 74 - 99 mg/dL    POCT Lactate Venous ECMO 1.1 0.4 - 2.0 mmol/L    POCT Base Excess, Venous ECMO 1.3 Reference range not established mmol/L    POCT HCO3 Calculated, Venous ECMO 26.6 Reference range not established mmol/L    POCT Hemoglobin, Venous ECMO 7.8 (L) 12.0 - 16.0 g/dL    POCT Anion Gap, Venous ECMO 13 Reference range not established mmo/L    Patient Temperature 37.0 degrees Celsius    FiO2 100 %   Basic Metabolic Panel   Result Value Ref Range    Glucose 177 (H) 74 - 99 mg/dL    Sodium 138 136 - 145 mmol/L    Potassium 3.8 3.5 - 5.3 mmol/L    Chloride 100 98 - 107 mmol/L    Bicarbonate 27 21 - 32 mmol/L    Anion Gap 15 10 - 20 mmol/L    Urea Nitrogen 14 6 - 23 mg/dL    Creatinine 0.70 0.50 - 1.05 mg/dL    eGFR >90 >60 mL/min/1.73m*2    Calcium 8.1 (L) 8.6 - 10.6 mg/dL   Phosphorus   Result Value Ref Range    Phosphorus 3.0 2.5 - 4.9 mg/dL   Blood Gas Arterial Full Panel   Result Value Ref Range    POCT pH, Arterial 7.41 7.38 - 7.42 pH    POCT pCO2, Arterial 42 38 - 42 mm Hg    POCT pO2, Arterial 315 (H) 85 - 95 mm Hg    POCT SO2, Arterial 100 94 - 100 %    POCT Oxy Hemoglobin,  Arterial 96.5 94.0 - 98.0 %    POCT Hematocrit Calculated, Arterial 24.0 (L) 36.0 - 46.0 %    POCT Sodium, Arterial 136 136 - 145 mmol/L    POCT Potassium, Arterial 3.8 3.5 - 5.3 mmol/L    POCT Chloride, Arterial 101 98 - 107 mmol/L    POCT Ionized Calcium, Arterial 1.09 (L) 1.10 - 1.33 mmol/L    POCT Glucose, Arterial 191 (H) 74 - 99 mg/dL    POCT Lactate, Arterial 1.3 0.4 - 2.0 mmol/L    POCT Base Excess, Arterial 1.8 -2.0 - 3.0 mmol/L    POCT HCO3 Calculated, Arterial 26.6 (H) 22.0 - 26.0 mmol/L    POCT Hemoglobin, Arterial 8.1 (L) 12.0 - 16.0 g/dL    POCT Anion Gap, Arterial 12 10 - 25 mmo/L    Patient Temperature 37.0 degrees Celsius    FiO2 21 %   ECMO ARTERIAL FULL PANEL   Result Value Ref Range    POCT pH, Arterial ECMO 7.43 Reference range not established pH    POCT pCO2, Arterial ECMO 38 Reference range not established mm Hg    POCT pO2,  Arterial ECMO 449 Reference range not established mm Hg    POCT SO2, Arterial ECMO 100 Reference range not established %    POCT Oxy Hemoglobin, Arterial ECMO 97.2 Reference range not established %    POCT Hematocrit Calculated, Arterial ECMO 24.0 (L) 36.0 - 46.0 %    POCT Sodium, Arterial  ECMO 134 (L) 136 - 145 mmol/L    POCT Potassium, Arterial  ECMO 3.7 3.5 - 5.3 mmol/L    POCT Chloride, Arterial  ECMO 102 98 - 107 mmol/L    POCT Ionized Calcium, Arterial  ECMO 1.06 (L) 1.10 - 1.33 mmol/L    POCT Glucose, Arterial  ECMO 183 (H) 74 - 99 mg/dL    POCT Lactate Arterial  ECMO 1.2 0.4 - 2.0 mmol/L    POCT Base Excess, Arterial ECMO 0.9 Reference range not established mmol/L    POCT HCO3 Calculated, Arterial ECMO 25.2 Reference range not established mmol/L    POCT Hemoglobin, Arterial  ECMO 7.9 (L) 12.0 - 16.0 g/dL    POCT Anion Gap, Arterial  ECMO 11 Reference range not established mmo/L    Patient Temperature 37.0 degrees Celsius    FiO2 100 %   POCT GLUCOSE   Result Value Ref Range    POCT Glucose 205 (H) 74 - 99 mg/dL   POCT GLUCOSE   Result Value Ref Range    POCT  Glucose 239 (H) 74 - 99 mg/dL     *Note: Due to a large number of results and/or encounters for the requested time period, some results have not been displayed. A complete set of results can be found in Results Review.        ASSESSMENT AND PLAN:    Ms. Yimi Bowles is a 34F with a PMHx sig for stage D HFrEF (PPCM) s/p ICD s/p CardioMEMs, hypothyroidism 2/2 thyroidectomy, obesity s/p gastric bypass (2017), and SLE who is s/p OHT (3/31/2022) with a post-OHT course complicated by RIJ/RUE DVTs, leukopenia, left groin seroma, and asymptomatic 2R ACR (11/2022) treated with steroids, s/p total hysterectomy (2023), Flu A (1/2024).  Who initially presented on 2/15/2024 in the setting of acute systolic heart failure with the graft failure and cardiogenic shock.  Patient currently is on VA ECMO support and Impella with ongoing acute renal failure requiring CRRT.    Principal Problem:    Cardiogenic shock (CMS/[facility])  Active Problems:    Heart transplant recipient (CMS/[facility])    ESRD (end stage renal disease) on dialysis (CMS/[facility])    HIRAM (acute kidney injury) (CMS/[facility])    Acute passive congestion of liver    Hyponatremia    Iron deficiency anemia    Abdominal pain    Cardiac transplant rejection (CMS/[facility])    Acute combined systolic (congestive) and diastolic (congestive) heart failure (CMS/[facility])      CRRT Management Note:  #HIRAM-D, 2/2 ATN in setting of cardiogenic shock. Started on CRRT initially 2/19 and transitioned to iHD however unable to achieve optimal fluid management so transitioned back to CRRT   - Hemodynamically stable on full vent support with ECMO and Impella. I/O: net even. Currently at 150ml/hr UF for goal net negative 500. Given stable hemodynamics off vasopressors recommend stopping CVVH when filter clots and attempting SLED. If filter clots tonight will wait until tomorrow to start SLED.   -Please replete ionized calcium and phosphorus as per the CRRT protocol.  -Of note patient is currently status 7 for heart  kidney transplant listed  on 4/2/2024.  Secondary to sepsis, pneumonia.     Josue Gil DO  PGY 4 Nephrology Fellow

## 2024-04-10 NOTE — PROGRESS NOTES
Art Therapy Note    Charla Bowles     Therapy Session  Referral Type: New referral this admission  Visit Type: Follow-up visit  Session Start Time: 1422  Intervention Delivery: In-person  Conflict of Service: Off unit              Treatment/Interventions            Narrative  Assessment Detail: ATR and MT attempted session but pt was off unit.  Follow-up: ATR will continue to f/u with pt, provided she remains admitted.    Education Documentation  No documentation found.

## 2024-04-10 NOTE — PROGRESS NOTES
Charla Bowles is a 34 y.o. female on day 55 of admission presenting with Cardiogenic shock (CMS/HCC).          Malnutrition Diagnosis Status: Declining  Malnutrition Diagnosis: Moderate malnutrition related to acute disease or injury  As Evidenced by: pt's reported intake likely meeting <75% of estimated needs for at least 7 days, previous 5% weight loss in 1 month, LOS 42.  I agree with the dietitian's malnutrition diagnosis.      Assessment/Plan   Principal Problem:    Cardiogenic shock (CMS/HCC)  Active Problems:    Heart transplant recipient (CMS/HCC)    ESRD (end stage renal disease) on dialysis (CMS/HCC)    Immunosuppression (CMS/HCC)    HIRAM (acute kidney injury) (CMS/HCC)    Acute passive congestion of liver    Hyponatremia    Iron deficiency anemia    Abdominal pain    Systolic congestive heart failure (CMS/HCC)    History of left ventricular assist device (CMS/HCC)    History of extracorporeal membrane oxygenation treatment    Moderate protein-calorie malnutrition (CMS/HCC)    Heart transplant as cause of abnormal reaction or later complication (CMS/HCC)    Cardiac transplant rejection (CMS/HCC)    Acute combined systolic (congestive) and diastolic (congestive) heart failure (CMS/HCC)    Patient requires ECMO support. Drainage cannula site, cannula, pump, oxygenator, return cannula and return cannula site clinically inspected. RPM and sweep reviewed and adjusted appropriate to clinical context. Anticoagulation status and intensity discussed. Plan for weaning, next clinical steps discussed with team and family. Discussed with cardiothoracic surgeon, perfusion, palliative care and physical/occupational therapy    ECMO Indication: Cardiogenic shock, cardiac transplant rejection  ECMO Cannula Configuration: right femoral arterial- right femoral venous  ECMO circuit run from drainage cannula to pump to oxygenator to return cannula: Yes  Presence of cannula bleeding: None apparent  Presence of oxygenator  clot: None apparent  Pre/post PaO2 adequate: Yes  LV Unloading/Pulse Pressure: Right axillary Impella 5.5 at P2, 1L flow  Distal Perfusion: In place  Anticoagulation Plan/Monitoring: SQH and heparin through Impella purge  Mobility Plan/Candidacy: OOB, PT/OT today  Path to decannulation/anticipated decannulation date: Currently status 7 on transplant list for heart/kidney   ECMO:     Most Recent Range Past 24hrs   Flow 3.12 Patient Flow (L/min)  Min: 2.96  Max: 3.28   Speed 3400 Circuit Flow (RPM)  Min: 3340  Max: 3401   Sweep 3 Sweep Gas Flow Rate (L/min)  Min: 3  Max: 3               I, as the attending critical care physician, have personally reviewed the recent patient history, physical exam, vital signs, significant labs, medications, imaging, and ECMO settings/flows of this critically ill patient. Using this information I will continue to actively manage this critically ill patient's extracorporeal membrane oxygenation (ECMO)/extracorporeal life support (ECLS) as documented in the assessment and plan above.         I spent 42 minutes in the professional and overall care of this patient.      Karley Dhillon MD

## 2024-04-10 NOTE — PROGRESS NOTES
"Vancomycin Dosing by Pharmacy- FOLLOW UP    Charla Bowles is a 34 y.o. year old female who Pharmacy has been consulted for vancomycin dosing for pneumonia. Based on the patient's indication and renal status this patient is being dosed based on a goal trough/random level of 15-20.     Renal function is currently CVVH dependent    Current vancomycin dose: 1000 mg given every 12 hours    Most recent trough level: 27.3 mcg/mL    Visit Vitals  BP 94/83 Comment: post: 102/92   Pulse (!) 121   Temp 36.4 °C (97.5 °F) (Temporal)   Resp (!) 38        Lab Results   Component Value Date    CREATININE 0.73 04/09/2024    CREATININE 0.73 04/09/2024    CREATININE 0.74 04/08/2024    CREATININE 0.82 04/08/2024        Patient weight is No results found for: \"PTWEIGHT\"    No results found for: \"CULTURE\"     I/O last 3 completed shifts:  In: 3643.9 (43.4 mL/kg) [P.O.:660; I.V.:453.9 (5.4 mL/kg); Blood:350; NG/GT:980; IV Piggyback:1200]  Out: 4755 (56.6 mL/kg) [Other:4753; Stool:2]  Weight: 84 kg   [unfilled]    Lab Results   Component Value Date    PATIENTTEMP 37.0 04/09/2024    PATIENTTEMP 37.0 04/09/2024    PATIENTTEMP 37.0 04/09/2024    PATIENTTEMP 37.0 04/09/2024        Assessment/Plan    Above goal random/trough level. Got 1g x1 after level collected. Will HOLD dose.   The next level will be obtained on 4/10 at 2000. May be obtained sooner if clinically indicated.   Will continue to monitor renal function daily while on vancomycin and order serum creatinine at least every 48 hours if not already ordered.  Follow for continued vancomycin needs, clinical response, and signs/symptoms of toxicity.       Rhianna Alonso, PharmD           "

## 2024-04-10 NOTE — PROGRESS NOTES
Art Therapy Note    Charla Bowles     Therapy Session  Referral Type: New referral this admission  Visit Type: Follow-up visit  Session Start Time: 1407  Intervention Delivery: In-person  Conflict of Service:  (Pt was on the phone)  Family Present for Session: None              Treatment/Interventions            Narrative  Assessment Detail: Pt was sitting up in bed and talking on the phone when ATR stopped by.  Follow-up: ATR will f/u another time provided pt remains admitted.    Education Documentation  No documentation found.

## 2024-04-10 NOTE — PROGRESS NOTES
Music Therapy Note    Charla Bowles    Therapy Session  Referral Type: New referral this admission  Visit Type: Follow-up visit  Session Start Time: 1421  Conflict of Service: Working with other staff               Treatment/Interventions            Narrative  Assessment Detail: MT and ATR attempted a session but pt busy with staff about to leave floor. MT and ATR will continue to f/u    Education Documentation  No documentation found.

## 2024-04-11 NOTE — PROGRESS NOTES
"Charla Bowles is a 34 y.o. female on day 56 of admission presenting with Cardiogenic shock (CMS/HCC).    Subjective   Slept well overnight.        Objective     Physical Exam    Last Recorded Vitals  Blood pressure (!) 100/97, pulse (!) 126, temperature 36.5 °C (97.7 °F), temperature source Temporal, resp. rate 15, height 1.549 m (5' 0.98\"), weight 84 kg (185 lb 3 oz), SpO2 92%.  Intake/Output last 3 Shifts:  I/O last 3 completed shifts:  In: 2999.4 (35.7 mL/kg) [P.O.:480; I.V.:769.4 (9.2 mL/kg); NG/GT:1700; IV Piggyback:50]  Out: 6586 (78.4 mL/kg) [Other:5986; Stool:600]  Weight: 84 kg     Relevant Results                        Malnutrition Diagnosis Status: Declining  Malnutrition Diagnosis: Moderate malnutrition related to acute disease or injury  As Evidenced by: pt's reported intake likely meeting <75% of estimated needs for at least 7 days, previous 5% weight loss in 1 month, LOS 42.  I agree with the dietitian's malnutrition diagnosis.      Assessment/Plan   Principal Problem:    Cardiogenic shock (CMS/HCC)  Active Problems:    Heart transplant recipient (CMS/HCC)    ESRD (end stage renal disease) on dialysis (CMS/HCC)    Immunosuppression (CMS/HCC)    HIRAM (acute kidney injury) (CMS/HCC)    Acute passive congestion of liver    Hyponatremia    Iron deficiency anemia    Abdominal pain    Systolic congestive heart failure (CMS/HCC)    History of left ventricular assist device (CMS/HCC)    History of extracorporeal membrane oxygenation treatment    Moderate protein-calorie malnutrition (CMS/HCC)    Heart transplant as cause of abnormal reaction or later complication (CMS/HCC)    Cardiac transplant rejection (CMS/HCC)    Acute combined systolic (congestive) and diastolic (congestive) heart failure (CMS/HCC)    Patient seen, evaluated, and discussed with the NILA.  I have personally obtained key components of the history and physical and have performed my own medical decision making.      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 Patient re-listed for heart transplant and kidney transplant in setting of cardiogenic shock and acute renal failure; however, team made decision to move her to status 7 given concern for infection.      Neuro: More awake today and less confused, oriented x3 and conversational on rounds. Continue PT/OT, out of bed. Pain control. Encourage sleep and REST protocol. Will continue seroquel, melatonin at night. Adjunct therapies if wanted to normalize environment. Will discuss music, art, and pet therapy to keep her engaged during the day.   CV: Heart transplant, complicated by rejection now with cardiogenic shock requiring ECMO and Impella. Continue full support on ECMO, Impella to P2 with 1L flow, stable LDH. ECMO flows 3.2L 3400 RPM and add heparin through Impella purge. Currently keeping Impella after discussion with multidisciplinary team.   Pulm: Continued tachypnea requiring nasal cannula today. ECMO sweep increased for respiratory acidosis. CT scan concerning for pneumonia, L > R. Will attempt to induce a sputum sample for culture. Increase bronchial hygiene as poor IS effort.    : Acute kidney injury, currently on CRRT - continue. Appreciate Renal consult. Optimize electrolytes. Goal even to negative if tolerated with goal CVP 10   GI: Bowel regimen. Continue PPI. Poor appetite and intake, continue TF today.   Heme: History of DVT with acute thromboses, including clots visualized in bilateral Ijs; US consistent with prior findings. Coagulopathy improved today after vitamin K, supporting nutritional coagulopathy. No current signs of bleeding. Continue heparin through Impella purge today as heparin assay is 0.2 and hemoglobin remains stable. Maintain active type and screen.   ID: Improved  leukocytosis today. Awaiting results from sputum culture, negative blood cultures to date, C diff negative. Continue coverage with meropenem with vancomycin, and micafungin; prophylactic Bactrim. ID following. Stopped myfortic and tacrolimus after multidisciplinary discussion. Continue current regimen given improvement.  Endo: Continue levothyroxine, recheck thyroid studies 4/16. SSI for glucose control.   Immunosuppression: Continue steroids, prophylaxis per transplant.      Lines: No PIV access currently. PICC team unable to visualize targets due to cephalic clots and infiltration edema. RIJ trialysis catheter placed 4/6.       Dispo: Continue CTICU care. Discussed with HF attending Dr. Toussaint on rounds. Patient and family updated at bedside.     This critically ill patient continues to be at risk for clinically significant deterioration / failure due to the above mentioned dysfunctional, unstable organ systems.  I have personally identified and managed all complex critical care issues to prevent aforementioned clinical deterioration.  Critical care time is spent at bedside and/or the immediate area and has included, but is not limited to, the review of diagnostic tests, labs, radiographs, serial assessments of hemodynamics, respiratory status, ventilatory management, review of consult team recommendations, and family updates.  Time spent in procedures and teaching are reported separately. Critical Care Time: 42 minutes       I spent 42 minutes in the professional and overall care of this patient.      Karley Dhillon MD

## 2024-04-11 NOTE — PROGRESS NOTES
Physical Therapy    Physical Therapy Treatment    Patient Name: Charla Bowles  MRN: [id number]  Today's Date: 4/11/2024  Time Calculation  Start Time: 1135  Stop Time: 1213  Time Calculation (min): 38 min       Assessment/Plan   PT Assessment  PT Assessment Results: Decreased strength, Decreased endurance, Impaired balance, Decreased mobility  Rehab Prognosis: Good  Evaluation/Treatment Tolerance: Patient tolerated treatment well  Medical Staff Made Aware: Yes  End of Session Communication: Bedside nurse  End of Session Patient Position: Bed, 3 rail up, Alarm off, not on at start of session  PT Plan  Inpatient/Swing Bed or Outpatient: Inpatient  PT Plan  Treatment/Interventions: Bed mobility, Transfer training, Balance training, Neuromuscular re-education, Strengthening, Endurance training, Therapeutic exercise, Therapeutic activity  PT Plan: Skilled PT  PT Frequency: 5 times per week  PT Discharge Recommendations: High intensity level of continued care  PT Recommended Transfer Status: Assist x2, Assistive device  PT - OK to Discharge: Yes      General Visit Information:   PT  Visit  PT Received On: 04/11/24  Response to Previous Treatment: Patient with no complaints from previous session.  General  Missed Visit: No  Family/Caregiver Present: No  Co-Treatment: OT  Co-Treatment Reason: Partial co tx with PT, on ECMO requiring skilled 2 person assist for safe mobility  Prior to Session Communication: Bedside nurse, Physician (NP)  Patient Position Received: Bed, 3 rail up, Alarm off, not on at start of session  Preferred Learning Style: auditory, verbal  General Comment: Pt awake, alert and willing to participate in PT session.  Assisted pt with completion of bed mobility, EOB sitting ~20 min and sit<>stand transfers x2.  Pt with stable vitals throughout session. R femoral ECMO flow 3.09/3.11, 100% FiO2, sweep 3, R axillary impella (P2) flow 1.1 (post 1.4), CVVH through ECMO. ECMO and impella examined pre,  during and post mobility, stable and secure.    Subjective   Precautions:  Precautions  Medical Precautions: Cardiac precautions, Fall precautions  Precautions Comment: R axillary impella precautions- no pushing/pulling with R UE, No lifting R UE above shoulder height, no R UE humeral EXT past plane of body, R femoral ECMO precautions, MAP 70-90, protective precautions  Vital Signs:  Vital Signs  Heart Rate: (!) 119 (Post: 123)  Heart Rate Source: Monitor  Resp: (!) 34 (Post: 21)  BP: (!) 103/92 (Post: 102/85)  MAP (mmHg): 97 (Post: 91)  BP Location: Right arm  BP Method: Arterial line  Patient Position: Lying    Objective   Pain:  Pain Assessment  Pain Assessment: 0-10  Pain Score: 0 - No pain  Cognition:  Cognition  Overall Cognitive Status: Within Functional Limits  Arousal/Alertness: Appropriate responses to stimuli  Orientation Level: Oriented X4  Activity Tolerance:  Activity Tolerance  Endurance: Tolerates 30 min exercise with multiple rests  Early Mobility/Exercise Safety Screen: Proceed with mobilization - No exclusion criteria met  Treatments:  Therapeutic Exercise  Therapeutic Exercise Performed: Yes  Therapeutic Exercise Activity 1: 1x8 reps of incentive spirometer: </=500  Therapeutic Exercise Activity 2: 1x6 reps of flutter device at EOB    Therapeutic Activity  Therapeutic Activity Performed: Yes  Therapeutic Activity 1: Increased time for skilled ICU line/tube management for safe functional mobility (NP assisted with ECMO line management during session)  Therapeutic Activity 2: Pt sat EOB ~20 min with CGA throughout sitting bout.  Vitals and ECMO flows stable.  Site checked and stable sitting EOB at 90 degrees.  Therapeutic Activity 3: x2 bouts of static standing requiring minAx2 - B arm in arm and B knees blocked (1st stand ~60 sec, 2nd stand ~3 min)  Therapeutic Activity 4: During 1st stand, completed lateral weight shifting    Bed Mobility  Bed Mobility: Yes  Bed Mobility 1  Bed Mobility 1: Supine  to sitting, Sitting to supine  Level of Assistance 1: Dependent (x2)  Bed Mobility Comments 1: HOB elevated, draw sheet utilized  Bed Mobility 2  Bed Mobility  2:  (Boost towards HOB)  Level of Assistance 2: Dependent (x2)  Bed Mobility Comments 2: HOB flat, draw sheet utilized    Ambulation/Gait Training  Ambulation/Gait Training Performed: No  Transfers  Transfer: Yes  Transfer 1  Transfer From 1: Sit to, Stand to  Transfer to 1: Sit, Stand  Technique 1: Sit to stand, Stand to sit  Transfer Device 1:  (B arm in arm)  Transfer Level of Assistance 1: Maximum assistance (x2)  Trials/Comments 1: 1st trial unsuccessful.  2nd/3rd transfer successful with B knees blocked.  Cues to extend knees and engage glutes.    Outcome Measures:  [facility] Basic Mobility  Turning from your back to your side while in a flat bed without using bedrails: A lot  Moving from lying on your back to sitting on the side of a flat bed without using bedrails: A lot  Moving to and from bed to chair (including a wheelchair): Total  Standing up from a chair using your arms (e.g. wheelchair or bedside chair): A lot  To walk in hospital room: Total  Climbing 3-5 steps with railing: Total  Basic Mobility - Total Score: 9    FSS-ICU  Ambulation: Unable to attempt due to weakness  Rolling: Maximal assistance (performs 25% - 49% of task)  Sitting: Supervision or set-up only  Transfer Sit-to-Stand: Maximal assistance (performs 25% - 49% of task)  Transfer Supine-to-Sit: Maximal assistance (performs 25% - 49% of task)  Total Score: 11    Education Documentation  Body Mechanics, taught by Michelle Lee PT at 4/11/2024  3:18 PM.  Learner: Patient  Readiness: Acceptance  Method: Explanation  Response: Verbalizes Understanding, Demonstrated Understanding    Mobility Training, taught by Michelle Lee PT at 4/11/2024  3:18 PM.  Learner: Patient  Readiness: Acceptance  Method: Explanation  Response: Verbalizes Understanding, Demonstrated  Understanding    Education Comments  No comments found.    EDUCATION:       Encounter Problems       Encounter Problems (Active)       Balance       Pt will demonstrate ability to complete sitting static/dynamic balance activities with unilateral UE support and no LOB for increase in safety up D/C.  (Progressing)       Start:  04/02/24    Expected End:  04/23/24               Mobility       Pt will demonstrated ability to complete 5 lateral side steps with modAx1, LRAD, proper form and no balance deficits for safe DC. (Progressing)       Start:  04/02/24    Expected End:  04/23/24            Pt will demonstrate ability to complete ther ex activities to improve functional strength for increase in independence upon DC.  (Progressing)       Start:  04/02/24    Expected End:  04/23/24               PT Transfers       Pt will demonstrated ability to complete bed mobility and sit<>stand transfers with mod assistance x2 and use of assistive device to safely DC. (Progressing)       Start:  04/02/24    Expected End:  04/23/24

## 2024-04-11 NOTE — CARE PLAN
Problem: Pain  Goal: Takes deep breaths with improved pain control throughout the shift  Outcome: Progressing  Goal: Turns in bed with improved pain control throughout the shift  Outcome: Progressing     Problem: Skin  Goal: Decreased wound size/increased tissue granulation at next dressing change  Outcome: Progressing  Goal: Participates in plan/prevention/treatment measures  Outcome: Progressing

## 2024-04-11 NOTE — PROGRESS NOTES
CTICU Progress Note  Charla Bowles/[id number]    Admit Date: 2/15/2024  Hospital Length of Stay: 56   ICU Length of Stay: 14d 12h   CT SURGEON: Dr. Witt    SUBJECTIVE:   Less confusion yesterday.    PT/OT x 25 min including improved trunk strength while sitting side of bed and standing for 2-3 min.        MEDICATIONS  Infusions:  dexmedeTOMIDine, Last Rate: Stopped (04/11/24 0630)  heparin Impella Purge 25 units/mL in 500 mL D5W, Last Rate: 10 mL/hr (04/11/24 0700)  lactated Ringer's, Last Rate: 5 mL/hr (04/11/24 0700)  PrismaSol 4/2.5, Last Rate: 2,400 mL/hr (04/09/24 1535)  [Held by provider] sodium bicarbonate infusion Impella Purge 25 mEq/1000 mL D5W, Last Rate: Stopped (04/09/24 1200)      Scheduled:  acetaminophen, 650 mg, q6h  bisacodyl, 10 mg, Once  calcium carbonate-vitamin D3, 1 tablet, Daily  cyanocobalamin, 500 mcg, Daily  darbepoetin floyd, 100 mcg, q14 days  ferrous sulfate (325 mg ferrous sulfate), 65 mg of iron, Daily with breakfast  folic acid, 1 mg, Daily  heparin (porcine), 5,000 Units, q8h  insulin lispro, 0-5 Units, TID with meals  levothyroxine, 250 mcg, Daily  lidocaine, 1 patch, Daily  melatonin, 10 mg, Daily  meropenem, 2 g, q12h  micafungin, 100 mg, q24h  multivitamin with minerals, 1 tablet, Daily  nystatin, 5 mL, q6h  oxygen, , Continuous - Inhalation  pantoprazole, 40 mg, Daily  predniSONE, 10 mg, Daily  [Held by provider] psyllium, 1 packet, Daily  QUEtiapine, 50 mg, Nightly  rosuvastatin, 10 mg, Nightly  [Held by provider] sennosides-docusate sodium, 1 tablet, BID  sod phos di, mono-K phos mono, 500 mg, 4x daily  sodium chloride, 1 spray, 4x daily  sulfamethoxazole-trimethoprim, 80 mg of trimethoprim, Daily  valGANciclovir, 450 mg, q48h  vancomycin, 1,000 mg, q24h      PRN:  alteplase, 2 mg, PRN  bisacodyl, 10 mg, Daily PRN  calcium gluconate, 1 g, q6h PRN  calcium gluconate, 2 g, q6h PRN  dextrose, 25 g, q15 min PRN  dextrose, 25 g, q15 min PRN   Or  glucagon, 1 mg, q15 min  "PRN  hydrALAZINE, 5 mg, q4h PRN  hydrOXYzine HCL, 25 mg, q6h PRN  ipratropium-albuteroL, 3 mL, q6h PRN  artificial tears, 2 drop, PRN  magnesium sulfate, 2 g, q6h PRN  magnesium sulfate, 4 g, q6h PRN  microfibrllar collagen, , PRN  mupirocin, , BID PRN  naloxone, 0.2 mg, q5 min PRN  ondansetron, 4 mg, q4h PRN  oxyCODONE, 5 mg, q6h PRN  sodium chloride, 1 spray, 4x daily PRN  vancomycin, , Daily PRN        PHYSICAL EXAM:   Visit Vitals  /82 Comment: post: 92/73   Pulse (!) 111   Temp 36.8 °C (98.2 °F) (Temporal)   Resp (!) 43   Ht 1.549 m (5' 0.98\")   Wt 84 kg (185 lb 3 oz)   SpO2 100%   BMI 35.01 kg/m²   OB Status Hysterectomy   Smoking Status Never   BSA 1.9 m²     Wt Readings from Last 5 Encounters:   04/06/24 84 kg (185 lb 3 oz)   12/07/23 92.1 kg (203 lb)   12/01/23 93 kg (205 lb)   11/29/23 92.9 kg (204 lb 12.8 oz)   11/09/23 91.3 kg (201 lb 3.2 oz)     INTAKE/OUTPUT:  I/O last 3 completed shifts:  In: 2999.4 (35.7 mL/kg) [P.O.:480; I.V.:769.4 (9.2 mL/kg); NG/GT:1700; IV Piggyback:50]  Out: 6586 (78.4 mL/kg) [Other:5986; Stool:600]  Weight: 84 kg        LDA:  ECMO Cannulation Peripheral Right;Femoral artery;Femoral vein (Active)   Placement Date/Time: 03/27/24 1700   ECMO Type: Peripheral  Cannulation Site: Right;Femoral artery;Femoral vein  Line Securement: Line secured in 2 locations;Securement device;Sutures   Number of days: 10       Arterial Line 03/27/24 Right Radial (Active)   Placement Date/Time: 03/27/24 1545   Orientation: Right  Location: Radial   Number of days: 10       Ventricular Assist Device Impella 5.5 (Active)   Placement Date/Time: 03/01/24 1956   Placed by: Dr Viramontes  Hand Hygiene Completed: Yes  Site Location: Axilla right  VAD Type: Left ventricular assist device  VAD Brand: Impella 5.5   Number of days: 36       Hemodialysis Cath 04/06/24 Triple lumen Right Non-tunneled catheter Jugular (Active)   Placement Date/Time: 04/06/24 1500   Hand Hygiene Completed: Yes  Site Prep: Usual " sterile procedure followed  Site Prep Agent has Completely Dried Before Insertion: Yes  All 5 Sterile Barriers Used (Gloves, Gown, Cap, Mask, Large Sterile Drape): Yes ...   Number of days: 0     Physical Exam:   - CONSTITUTION: Critically ill on MCS  - NEUROLOGIC: A+O and CAM neg today, generally less confused. following in all 4 extremities.  Systemically weak   - CARDIOVASCULAR: ST, R fem VA ECMO, no bleeding at site. R axillary impella, no bleeding at site. No s/s infection at either site. +distal signals in bilateral lower extremities  - RESPIRATORY: Coarse, diminished bilateral lung sounds, symmetric chest expansion  - GI: Abdomen soft, non tender, non distended, +bowel sounds.  Diarrhea to FMS  - : Anuric, on CVVH via ECMO circuit  - EXTREMITIES: Warm and dry, no peripheral edema  - SKIN: Left femoral skin breakdown with dressing in place  - PSYCHIATRIC: Calm vs anxious    Images: CXR c/f worsening pulmonary edema    Invasive Hemodynamics:    Most Recent Range Past 24hrs   BP (Art) 91/79 Arterial Line BP 1  Min: 81/68  Max: 112/78   MAP(Art) 84 mmHg Arterial Line MAP 1 (mmHg)   Min: 72 mmHg  Max: 99 mmHg   RA/CVP   No data recorded   PA 41/34 No data recorded   PA(mean) 37 mmHg No data recorded   CO 5.5 L/min No data recorded   CI 2.8 L/min/m2 No data recorded   Mixed Venous 65.5 % SVO2 (%)  Min: 53.9 %  Max: 71.8 %   SVR  1115 (dyne*sec)/cm5 No data recorded     ECMO:     Most Recent Range Past 24hrs   Flow 3.16 Patient Flow (L/min)  Min: 2.98  Max: 3.28   Speed 3400 Circuit Flow (RPM)  Min: 3400  Max: 3401   Sweep 3 Sweep Gas Flow Rate (L/min)  Min: 3  Max: 3      Impella:      Most Recent Range Past 24hrs   Performance Level 2 P Level  Min: 2   Min taken time: 04/11/24 0800  Max: 2   Max taken time: 04/11/24 0800   Flow (L/min) 0.8 Flow (L/min)  Min: 0.6   Min taken time: 04/11/24 0600  Max: 1.7   Max taken time: 04/10/24 1300   Motor Current 115/75 Motor Current  Min: 109/75   Min taken time: 04/11/24  0000  Max: 165/81   Max taken time: 04/10/24 1400   Placement Signal No  Placement OK could not be evaluated. This SmartLink does not work with rows of the type: Custom List   Purge (mmHg) 549 Purge Pressure (mmHg)  Min: 389   Min taken time: 04/11/24 0600  Max: 646   Max taken time: 04/10/24 1900   Purge rate (mL/hr) 9.5 Purge Rate (mL/hr)  Min: 9.5   Min taken time: 04/11/24 0800  Max: 10.9   Max taken time: 04/10/24 1600        Daily Risk Screen:  Dialysis/Hemapheresis    Assessment/Plan   34F with a PMHx sig for stage D HFrEF (PPCM) s/p ICD s/p CardioMEMs, hypothyroidism 2/2 thyroidectomy, obesity s/p gastric bypass (2017), and SLE who is s/p OHT (3/31/2022) with a post-OHT course complicated by RIJ/RUE DVTs, leukopenia, left groin seroma, and asymptomatic 2R ACR (11/2022) treated with steroids, s/p total hysterectomy (2023), Flu A (1/2024).     She presented to [facility] ED on 2/15/24 with complaints of N/V x 3 days and inability to keep medications down. Found to have acute systolic heart failure with primary graft failure and cardiogenic shock. Treated for suspected acute cellular vs. acute antibody mediated rejection; however, multiple cardiac biopsies negative for signs of rejection or other causes of graft failure. Her course has been complicated by multiple MCS devices for refractory cardiogenic shock (right femoral IABP: 2/18/24 - 3/1/24, Left femoral VA ECMO: 2/19/24 - 2/29/24, Right axillary impella 5.5: 3/1/24 - remains in place, 2nd right femoral VA ECMO: 3/27/24 - remains in place), ARF requiring RRT, acute liver injury, intubation due to volume overload in setting of pulmonary edema, severe hemolysis 2/2 to impella malposition, mild CMV viremia, bilateral provoked IJ DVTs, multiple episodes of epistaxis & coagulopathies requiring ongoing blood product transfusions.        Charla was transferred to CTICU on 3/27/24 following right femoral VA ECMO placement due to worsening cardiogenic shock and  multi-organ failure despite Impella 5.5 support and multiple inotropes. She was listed Status 1 for heart/kidney on 4/2, however was deactivated (status 7) due to c/f sepsis.  She remains critically ill in the CTICU on MCS with ECMO and an Impella as well as CVVH.       NEURO:  She vacillates between being  neuro intact with intermittent delirium and anxiety thugh is much improved in last 24 hrs.  Insomnia supported with multimodals and REST protocol.  Cam negative this AM.  She sat on edge of bed with PT for 20-25 min the last few days and is demonstrating improve trunk strength; she stood with max assist x 2 today. -->  - Serial neuro and pain assessments  - Continue tylenol scheduled  - Continue lidoderm patch for back pain  - PRN oxy 5mg Q6   - Continue melatonin 10 mg HS   - Continue Seroquel 50 mg HS (Qtc 399 6/9)     - May use precedex at bedtime for sleep  - PRN hydroxyzine  - PT/OT Consult; mobilize as able  - CAM ICU score qshift. Sleep/wake cycle hygiene  - REST protocol (CXR and labs after 6am)      ENT: Multiple episodes of epistaxis with interventions by ENT. Most recently in OR 3/27 with L nare packed. Packing removed 4/1. -->  - appreciate ENT recs  - PRN ocean spray and mupiricin for dry nasal passages  - PRN afrin      Cardiac: Patient has a history of heart transplant in March of 2022 with primary graft dysfunction treated for acute cellular and antibody rejection without improvement with negative biopsy with multiple MCS devices for refractory cardiogenic shock (right femoral IABP: 2/18/24 - 3/1/24; Left femoral VA ECMO: 2/19/24 - 2/29/24; Right axillary impella 5.5: 3/1/24 - ; 2nd right femoral VA ECMO: 3/27/24 - ). Elevated LDH and difficulty with flows despite multiple attempts at repositioning Impella previously.  ECMO flows appeared to have been weaned over the last few days with hopes of decreasing pulmonary edema.  Current ECMO settings RPMs 3190 flows ~2.8L with FiO2 90% and sweep 3;  Impella 5.5 P3 with flows ~0.3-1.0 LPM.  ST 1o0s, MAPs 65-70s while asleep and 90's awake today. -->  - Maintain goal MAP 70-90  - Continue full BiV support with ECMO, increase flows to 3 LPM  - Continue Impella at P3, for LV Venting, discuss removal with multidisciplinary team in setting of inadequate positioning, low flows, c/f infection of unknown origin  - Continue rosuvastatin dose 10mg daily for impaired renal excretion  - Trend daily LDH   - Hold home amlodipine     Vascular: 3/27 arterial duplex with proximal SFA occlusion with collateral flow. C/f LLE ischemia (previous ECMO site) with loss of pulses- now monophasic with CK that had been normal and no longer trending. Feet warm with intact motor exam. -->  - appreciate vascular surgery following  - Vascular Surgery following for L SFA- No intervention needed at this time     PULM: She has been intubated multiple times throughout hospital course for procedures. Acute respiratory failure persisting due to acute pulmonary edema had been improving, now increasing pulmonary congestion. Gas exchange augmented by VA ECMO, occasionally on NC for comfort.  IS improved today to 750cc; she is getting RT bronch hygiene TID.  CT chest 4/9 with severe multifocal PNA -->  - CXR daily  - Adjust sweep for pH 7.3 - 7.5  - Maintain SPO2 > 92%     GI: History of gastric bypass and MMF colitis. Shock liver originally resolved; most recently elevated r/t likely congestive hepatopathy that is improving on ECMO. Abdominal pain improved with reduced myfortic dosing. Previous c/f GI bleed, likely blood from epistaxis; no current evidence of GI bleeding. H pylori negative, fecal calprotectin (elevated at 380), fecal lactoferrin (Positive 03/23/24). Last BM 4/9. Poor nutritional intake.  Dobhoff placed by ENT 4/8 under fiberoptic visualization.  Concern for aspiration in setting of PNA, limited SLP eval 4/10 was concerning for coughing with solids and they recommended sips & chips  only for now.  -->  - Continue calcitriol 0.5mg daily, multivitamin, calcium carbonate 1,250mg BID  - Continue PPI daily  - Continue TF to goal  - HOLD PO diet per SLP rec's, sips and chips ok   - Hold Ensure Clear TID and magic cup  - Hold miralax BID & senna-colace BID     :  No history, baseline Cr 1.2-1.3. HIRAM originally on CVVH then with renal recovery but then again worsened and now dialysis dependent and failed diuretic challenges. Hypervolemia improved. Persisting hypophosphatemia despite repeated supplementation. -->  - RFP as clinically indicated, replete electrolytes per CTICU protocol.   - Continue CVVH with goal removal -500 to 1000 pending CVP goal 8-10   - Continue Sodium Phos 500 mg 4x daily  - Nephrology Following     ENDO: History of hypothyroidism and prediabetes. TSH elevated originally to malabsorption then with dosing adjusted by endo; TSH (3/17): 20.74, T4 1.11, T3: 1.4, improved 4/1; TSH 10.13, T4 0.70. 4/8: TSH 19.48, T3 0.8, T4 0.68. Steroid induced hyperglycemia acceptable glycemic control on SSI. -->  - Maintain BG <180 with hypoglycemia protocol  - Continue SSI #1 AC/HS   - Continue Synthroid 250mcg daily.   - Recheck TFT's 6/16 (ordered)   - Appreciate Endocrine Consult      HEME: History of iron deficiency anemia and remote DVT; again with acute DVT LIJ and SVT left cephalic vein and right cephalic vein. Acute blood loss anemia with repeated transfusions with significant hemolysis but no evidence of active bleeding; last received RBC 4/5. Stable thrombocytopenia persisting; last PF4 negative 3/30. No epistaxis today. Received 2 U PRBC 4/5. Coagulopathic with increasing INR but stable LFTs, 2.9 today. Possibly 2/2 malnutrition. Liver ultrasound 4/6 unconcerning. -->  - Continue ferrous sulfate daily  - Avoid unnecessarily transfusing, HGB > 7.0  - Vitamin K x 3 doses (4/8 - 10)   - Hold systemic heparin with coagulopathy & recent anemia, re-eval daily   - Continue heparin purge in the  Impella.   - Trend coags daily  - SCDs, SQH for DVT prophylaxis  - Aranesp every 2 weeks  - Last type and screen: 4/9     Rejection/prophylaxis: S/p heart transplant 3/31/2022. Induction basiliximab. Donor HCV -, toxo -/-, CMV -/+. Mild ACR with +CD4 and negative HLAs however clinical presentation concern for acute cellular vs AMR rejection/stuttering rejection completed courses of thymo/PLEX/IVIG without clinical improvement.  With consideration to progressive graft failure and concern for infection limiting re-transplant at this time, we are holding tacro and myfortic (4/9 - ).  - Steroids: Continue PO prednisone 10 mg daily  - Hold Tacrolimus: 4.0 mg AM/3.5 mg PM w/ daily levels drawn @ 0600  - Hold Tacrolimus goal troughs: 8-10  - Hold Myfortic acid: 360mg BID.  (Not starting MMF d/t hx of colitis)  - Antifungals: nystatin 500,000units Q6  - Antivirals: valcyte 450mg Q48hrs  - Anti PCP & Toxoplasmosis: continue SS Bactrim with c/f PNA     ID: C/f previous yeast infection. BC 3/27 NGTD final. MRSA screen negative 3/28. Episode of hypotension on 4/1, pan cultured and empiric Vanco/Zosyn started.  Patient was shivering 4/3, concern for risk of infection. Blood cultures sent 4/3, NGTD. Zosyn (4/1- 4/7) then broadened to Susan (4-7 - ) and natalie started (4/7 - ). CT chest 4/9 demonstrates severe multifocal PNA.  UA culture with enterococcus but difficult to define as infection given anuric state.  Beta-D glucan mildly elevated 195 thought to be 2/2 recent IVIG though could also be indicative of PJP.  WBC downtrending and overall she is clinically improved, CXR grossly unchanged with multifocal pna -->  - Trend temp q4h  - Continue Susan and Natalie (4/7 - )  - Continue Vanco (4/6 - )  - Follow up cxs   - Appreciate ID Consult, considering de-escilating micafungin in coming days however HF team would like to continue for now.       Skin: Left groin wound from previous ECMO with wound consulted. Wound cx with NG 3/15.   -  Preventative Mepilex dressings in place on sacrum and heels  - Change preventative Mepilex weekly or more frequently as indicated (when moist/soiled)   - Every shift skin assessment per nursing and weekly ICU skin rounds  - Moisture barrier to be applied with christopher care  - Active skin problems addressed with nursing on daily rounds  - wound recs from note 3/15 ordered      Proph:  SCDs  SQH & heparin purge   PPI     G: Lines  RIJ Trialysis - 4/6  R radial maxwell 3/27, not working well, likely replace today   PIV x2    Restraints: Not indicated    Code status: Full Code    I personally spent 44 minutes of critical care time directly and personally managing the patient exclusive of separately billable procedures.     A,B,C,D,E,F,G: reviewed.    A&P reviewed on multidisciplinary critical care rounds      Dispo: CTICU care for now.    CTICU TEAM PHONE [id number]

## 2024-04-11 NOTE — PROCEDURES
Using sterile technique under direct ultrasound visualization, the mid right radial artery was cannulated and a 12cm 20g catheter was inserted without difficulty.  The patient tolerated this well though there appears to have had a small hematoma develop between the wire insertion and the catheter insertion.  The hematoma did not appear to be enlarging post procedure.  She is pain free and distal CSM's remain intact.

## 2024-04-11 NOTE — PROGRESS NOTES
Occupational Therapy    Occupational Therapy Treatment    Name: Charla Bowles  MRN: [id number]  : 1990  Date: 24  Time Calculation  Start Time: 1114  Stop Time: 1213  Time Calculation (min): 59 min    Assessment:  End of Session Communication: Bedside nurse (NP)  End of Session Patient Position: Bed, 3 rail up, Alarm off, not on at start of session  Plan:  Treatment Interventions: ADL retraining, Functional transfer training, Endurance training, UE strengthening/ROM, Cognitive reorientation, Patient/family training, Equipment evaluation/education, Neuromuscular reeducation, Fine motor coordination activities, Compensatory technique education  OT Frequency: 5 times per week  OT Discharge Recommendations: High intensity level of continued care  Equipment Recommended upon Discharge:  (TBD)  OT Recommended Transfer Status: Assist of 2  OT - OK to Discharge: Yes    Subjective   General:  OT Last Visit  OT Received On: 24  Co-Treatment: PT  Co-Treatment Reason: Partial co tx with PT, on ECMO requiring skilled 2 person assist for safe mobility  Prior to Session Communication: Bedside nurse, Physician (NP)  Patient Position Received: Bed, 3 rail up, Alarm off, not on at start of session  Family/Caregiver Present: No  General Comment: Pt supine in bed upon arrival. Agreeable and highly motivated. R femoral ECMO flow 3.06 (post: 3.12), 100% FiO2, sweep 3, R axillary impella (P2) flow 1.1 (post 1.4), CVVH through ECMO. ECMO and impella examined pre, during and post mobility, stable and secure.     Precautions:  Hearing/Visual Limitations: WFL  Medical Precautions: Cardiac precautions, Fall precautions  Precautions Comment: R axillary impella precautions- no pushing/pulling with R UE, No lifting R UE above shoulder height, no R UE humeral EXT past plane of body, R femoral ECMO precautions, MAP 70-90, protective precautions    Vitals:  Vital Signs  Heart Rate: (!) 114 (post: 124)  Resp: (!) 30 (post:  19)  SpO2: 93 %  BP: (!) 100/97 (post: 101/85)  MAP (mmHg): 101 (post: 90)  Lines/Tubes/Drains:  ECMO Cannulation Peripheral Right;Femoral artery;Femoral vein (Active)   Number of days: 14       Arterial Line 03/27/24 Right Radial (Active)   Number of days: 14       Ventricular Assist Device Impella 5.5 (Active)   Number of days: 40       NG/OG/Feeding Tube Nasojejunal Left nostril (Active)   Number of days: 3       Rectal Tube With balloon (Active)   Number of days: 1       Hemodialysis Cath 04/06/24 Triple lumen Right Non-tunneled catheter Jugular (Active)   Number of days: 4       Cognition:  Overall Cognitive Status: Within Functional Limits  Arousal/Alertness: Appropriate responses to stimuli  Orientation Level: Oriented X4    Pain Assessment:  Pain Assessment  Pain Assessment: 0-10  Pain Score: 0 - No pain     Objective   Activities of Daily Living:   Feeding  Feeding Level of Assistance: Setup  Feeding Where Assessed: Edge of bed  Feeding Comments: increased time to bring to drink to mouth, performed x3 during session        Bed Mobility/Transfers:     Bed Mobility  Bed Mobility: Yes  Bed Mobility 1  Bed Mobility 1: Supine to sitting, Sitting to supine  Level of Assistance 1: Dependent (x2)  Bed Mobility Comments 1: HOB elevated, draw sheet    Transfers  Transfer: Yes  Transfer 1  Transfer From 1: Sit to  Transfer to 1: Stand  Transfer Level of Assistance 1: Maximum assistance (x2 assist)  Trials/Comments 1: x2 successful trials, bilat knee block      Therapy/Activity:      Therapeutic Activity  Therapeutic Activity Performed: Yes  Therapeutic Activity 1: Pt provided with journal this date and given a variety of journaling prompts, including positive distraction, releasing negative emotions, and free writing prompts. pt enjoyed task and eager to utilize journal daily.  Therapeutic Activity 2: Pt engaged in ~7 minute progressive muscle relaxation exercise while supine in bed. Tolerated well and reported  decreased tension following task.  Therapeutic Activity 3: Pt sat EOB ~20 minutes with SBA-CGA for safety.  Therapeutic Activity 4: On stand trial 1, pt tolerated ~1 minute static standing with min A x2. On stand trial 2, pt tolerated ~2 minutes 50 seconds static stand with min A x2.      Outcome Measures:  [facility] Daily Activity  Putting on and taking off regular lower body clothing: Total  Bathing (including washing, rinsing, drying): A lot  Putting on and taking off regular upper body clothing: A lot  Toileting, which includes using toilet, bedpan or urinal: Total  Taking care of personal grooming such as brushing teeth: A little  Eating Meals: A little  Daily Activity - Total Score: 12     Education Documentation  Body Mechanics, taught by Marcia Thomason OT at 4/11/2024 12:29 PM.  Learner: Patient  Readiness: Acceptance  Method: Explanation, Demonstration  Response: Verbalizes Understanding    Precautions, taught by Marcia Thomason OT at 4/11/2024 12:29 PM.  Learner: Patient  Readiness: Acceptance  Method: Explanation, Demonstration  Response: Verbalizes Understanding    Home Exercise Program, taught by Marcia Thomason OT at 4/11/2024 12:29 PM.  Learner: Patient  Readiness: Acceptance  Method: Explanation, Demonstration  Response: Verbalizes Understanding    Body Mechanics, taught by Marcia Thomason OT at 4/10/2024  2:59 PM.  Learner: Patient  Readiness: Acceptance  Method: Explanation, Demonstration  Response: Verbalizes Understanding    Precautions, taught by Marcia Thomason OT at 4/10/2024  2:59 PM.  Learner: Patient  Readiness: Acceptance  Method: Explanation, Demonstration  Response: Verbalizes Understanding    Home Exercise Program, taught by Marcia Thomason OT at 4/10/2024  2:59 PM.  Learner: Patient  Readiness: Acceptance  Method: Explanation, Demonstration  Response: Verbalizes Understanding    Education Comments  No comments  found.        Goals:  Encounter Problems       Encounter Problems (Active)       ADLs       Patient will complete daily grooming tasks in standing with LRAD with supervision level of assistance and PRN adaptive equipment. (Progressing)       Start:  03/01/24    Expected End:  04/16/24               ADLs       Patient with complete lower body dressing with stand by assist level of assistance donning and doffing all LE clothes  with PRN adaptive equipment (Not met)       Start:  03/04/24    Expected End:  03/18/24    Resolved:  04/02/24    Updated to: Patient with complete UB bathing with stand by assist level of assistance  with PRN adaptive equipment    Update reason: re eval          Patient will complete toileting including hygiene clothing management/hygiene with stand by assist level of assistance. (Not met)       Start:  03/04/24    Expected End:  03/18/24    Resolved:  04/02/24    Updated to: Patient will complete upper body dressing including with mod I level of assistance.    Update reason: re eval         Patient with complete UB bathing with stand by assist level of assistance  with PRN adaptive equipment (Progressing)       Start:  04/02/24    Expected End:  04/16/24                Patient will complete upper body dressing including with mod I level of assistance. (Progressing)       Start:  04/02/24    Expected End:  04/16/24                   BALANCE       Pt will increase dynamic standing tolerance to >10 min with supervision using LRAD during functional mobility/ADLs without LOB in order to improve activity tolerance and balance for self-care tasks.  (Progressing)       Start:  03/01/24    Expected End:  04/16/24               COGNITION/SAFETY       Patient will participate in cognitive activities to demonstrate WFL score on further cognitive assessments, including Medi-Cog, MoCA and remain A&O x3, CAM (-).  (Progressing)       Start:  03/01/24    Expected End:  04/16/24                EXERCISE/STRENGTHENING       Patient will be educated on BUE HEP for increased ADL/IADL performance. (Progressing)       Start:  03/01/24    Expected End:  04/16/24               MOBILITY       Patient will perform Functional mobility max Household distances/Community Distances with supervision level of assistance and least restrictive device in order to improve safety and functional mobility. (Progressing)       Start:  03/01/24    Expected End:  04/16/24 04/11/24 at 12:30 PM   Marcia Thomason, OT   [phone]

## 2024-04-11 NOTE — PROGRESS NOTES
Charla Bowles is a 34 y.o. female on day 56 of admission presenting with Cardiogenic shock (CMS/HCC).      Malnutrition Diagnosis Status: Declining  Malnutrition Diagnosis: Moderate malnutrition related to acute disease or injury  As Evidenced by: pt's reported intake likely meeting <75% of estimated needs for at least 7 days, previous 5% weight loss in 1 month, LOS 42.  I agree with the dietitian's malnutrition diagnosis.      Assessment/Plan   Principal Problem:    Cardiogenic shock (CMS/HCC)  Active Problems:    Heart transplant recipient (CMS/HCC)    ESRD (end stage renal disease) on dialysis (CMS/HCC)    Immunosuppression (CMS/HCC)    HIRAM (acute kidney injury) (CMS/HCC)    Acute passive congestion of liver    Hyponatremia    Iron deficiency anemia    Abdominal pain    Systolic congestive heart failure (CMS/HCC)    History of left ventricular assist device (CMS/HCC)    History of extracorporeal membrane oxygenation treatment    Moderate protein-calorie malnutrition (CMS/HCC)    Heart transplant as cause of abnormal reaction or later complication (CMS/HCC)    Cardiac transplant rejection (CMS/HCC)    Acute combined systolic (congestive) and diastolic (congestive) heart failure (CMS/HCC)    Patient requires ECMO support. Drainage cannula site, cannula, pump, oxygenator, return cannula and return cannula site clinically inspected. RPM and sweep reviewed and adjusted appropriate to clinical context. Anticoagulation status and intensity discussed. Plan for weaning, next clinical steps discussed with team and family. Discussed with cardiothoracic surgeon, perfusion, palliative care and physical/occupational therapy    ECMO Indication: Cardiogenic shock  ECMO Cannula Configuration: Right femoral venous-right femoral arterial  ECMO circuit run from drainage cannula to pump to oxygenator to return cannula: Yes  Presence of cannula bleeding: None apparent  Presence of oxygenator clot: None apparent  Pre/post PaO2  adequate: Yes  LV Unloading/Pulse Pressure: Right axillary Impella 5.5 at P2, 1L flow  Distal Perfusion: In place  Anticoagulation Plan/Monitoring: Heparin through Impella purge, heparin assay 0.2  Mobility Plan/Candidacy: OOB, PT/OT today  Path to decannulation/anticipated decannulation date:Currently status 7 on transplant list for heart/kidney   ECMO:     Most Recent Range Past 24hrs   Flow 3.09 Patient Flow (L/min)  Min: 3  Max: 3.28   Speed 3401 Circuit Flow (RPM)  Min: 3400  Max: 3401   Sweep 3 Sweep Gas Flow Rate (L/min)  Min: 3  Max: 3       [Held by provider] dexmedeTOMIDine, 0.1-1.5 mcg/kg/hr, Last Rate: Stopped (04/11/24 0630)  heparin Impella Purge 25 units/mL in 500 mL D5W, 10 mL/hr, Last Rate: 10 mL/hr (04/11/24 1000)  lactated Ringer's, 5 mL/hr, Last Rate: 5 mL/hr (04/11/24 1000)  PrismaSol 4/2.5, 2,400 mL/hr, Last Rate: 2,400 mL/hr (04/09/24 1535)  [Held by provider] sodium bicarbonate infusion Impella Purge 25 mEq/1000 mL D5W, 10 mL/hr, Last Rate: Stopped (04/09/24 1200)         I, as the attending critical care physician, have personally reviewed the recent patient history, physical exam, vital signs, significant labs, medications, imaging, and ECMO settings/flows of this critically ill patient. Using this information I will continue to actively manage this critically ill patient's extracorporeal membrane oxygenation (ECMO)/extracorporeal life support (ECLS) as documented in the assessment and plan above.           I spent 42 minutes in the professional and overall care of this patient.      Karley Dhillon MD

## 2024-04-11 NOTE — PROGRESS NOTES
SW saw pt at bedside during interdisciplanary rounds in the CTICU. Pt was awake and laying in bed during visit. Team expressed how pt had a good night last night, feeling more calm and clear minded. Pt is working no light exercised, sitting on the side of the bed, using a peddler and stood for a few moments. SW will continue to follow with pt and pt family in patient.      Alexa MILLER

## 2024-04-11 NOTE — PROGRESS NOTES
[facility]  ADVANCED HEART FAILURE AND TRANSPLANT CARDIOLOGY   Charla Bowles/[id number]    Admit Date: 2/15/2024  Hospital Length of Stay: 56   ICU Length of Stay: 14d 17h   Primary Service: CTICU      Charla Bowles is a 34 y.o. female on day 56 of admission presenting with Cardiogenic shock (CMS/HCC).    Subjective   A/Ox4 this morning. Complains of pain from her dobhoff due to the bridal anchor. Explained the importance of the dobhoff, along with the risks if the bridal was removed and dobhoff needed replacement. Patient voiced her understanding.       Objective     Physical Exam  Constitutional:       Appearance: She is ill-appearing.   HENT:      Head: Normocephalic.      Comments: Not actively bleeding      Mouth/Throat:      Mouth: Mucous membranes are moist.      Pharynx: Oropharynx is clear. No oropharyngeal exudate or posterior oropharyngeal erythema.   Eyes:      Extraocular Movements: Extraocular movements intact.      Conjunctiva/sclera: Conjunctivae normal.      Pupils: Pupils are equal, round, and reactive to light.   Neck:      Vascular: No JVD.   Cardiovascular:      Rate and Rhythm: Tachycardia present.      Comments: Right axillary impella in place ~45cm at hub (no hematoma), Right femoral VA ECMO in place with reperfusion catheter (site appears clean and dry). Dopplerable radial and ulnar pulses bilaterally. Dopplerable PT pulses bilateral. Unable to doppler DP bilaterally.  Pulmonary:      Effort: No accessory muscle usage, respiratory distress or retractions.      Breath sounds: Normal breath sounds. No wheezing, rhonchi or rales.      Comments: RA. Tachypnea w/ RR 20-30. Small amount of blood tinged and purulent sputum.  Abdominal:      General: Abdomen is flat. Bowel sounds are normal. There is no distension.      Palpations: Abdomen is soft. There is no mass.      Hernia: No hernia is present.   Musculoskeletal:         General: No swelling (+1 BLE  "edema) or tenderness. Normal range of motion.      Cervical back: Full passive range of motion without pain.   Skin:     General: Skin is dry.      Capillary Refill: Capillary refill takes less than 2 seconds.      Coloration: Skin is not jaundiced.      Findings: Bruising and lesion (Left groin wound appears clean with granulation tissue. No signs of infection.) present. No erythema, laceration, rash or wound.   Neurological:      General: No focal deficit present.      Mental Status: She is alert and oriented to person, place, and time. She is confused.   Psychiatric:         Mood and Affect: Mood is anxious.         Behavior: Behavior normal.         Last Recorded Vitals  Blood pressure (!) 100/97, pulse (!) 121, temperature 36.5 °C (97.7 °F), temperature source Temporal, resp. rate 25, height 1.549 m (5' 0.98\"), weight 84 kg (185 lb 3 oz), SpO2 92%.  Intake/Output last 3 Shifts:  I/O last 3 completed shifts:  In: 2999.4 (35.7 mL/kg) [P.O.:480; I.V.:769.4 (9.2 mL/kg); NG/GT:1700; IV Piggyback:50]  Out: 6586 (78.4 mL/kg) [Other:5986; Stool:600]  Weight: 84 kg     Relevant Results  Scheduled medications  acetaminophen, 650 mg, oral, q6h  bisacodyl, 10 mg, rectal, Once  calcium carbonate-vitamin D3, 1 tablet, oral, Daily  cyanocobalamin, 500 mcg, oral, Daily  darbepoetin floyd, 100 mcg, subcutaneous, q14 days  ferrous sulfate (325 mg ferrous sulfate), 65 mg of iron, oral, Daily with breakfast  folic acid, 1 mg, oral, Daily  heparin (porcine), 5,000 Units, subcutaneous, q8h  insulin lispro, 0-5 Units, subcutaneous, TID with meals  levothyroxine, 250 mcg, oral, Daily  lidocaine, 1 patch, transdermal, Daily  melatonin, 10 mg, oral, Daily  meropenem, 2 g, intravenous, q12h  micafungin, 100 mg, intravenous, q24h  multivitamin with minerals, 1 tablet, oral, Daily  nystatin, 5 mL, Swish & Swallow, q6h  oxygen, , inhalation, Continuous - Inhalation  pantoprazole, 40 mg, intravenous, Daily  predniSONE, 10 mg, oral, " Daily  [Held by provider] psyllium, 1 packet, oral, Daily  QUEtiapine, 50 mg, oral, Nightly  rosuvastatin, 10 mg, oral, Nightly  [Held by provider] sennosides-docusate sodium, 1 tablet, oral, BID  sod phos di, mono-K phos mono, 500 mg, oral, 4x daily  sodium chloride, 1 spray, Each Nostril, 4x daily  sulfamethoxazole-trimethoprim, 80 mg of trimethoprim, oral, Daily  valGANciclovir, 450 mg, oral, q48h  vancomycin, 1,000 mg, intravenous, q24h      Continuous medications  [Held by provider] dexmedeTOMIDine, 0.1-1.5 mcg/kg/hr, Last Rate: Stopped (04/11/24 0630)  heparin Impella Purge 25 units/mL in 500 mL D5W, 10 mL/hr, Last Rate: 10 mL/hr (04/11/24 1000)  lactated Ringer's, 5 mL/hr, Last Rate: 5 mL/hr (04/11/24 1000)  PrismaSol 4/2.5, 2,400 mL/hr, Last Rate: 2,400 mL/hr (04/09/24 1535)  [Held by provider] sodium bicarbonate infusion Impella Purge 25 mEq/1000 mL D5W, 10 mL/hr, Last Rate: Stopped (04/09/24 1200)      PRN medications  PRN medications: alteplase, bisacodyl, calcium gluconate, calcium gluconate, dextrose, dextrose **OR** glucagon, hydrALAZINE, hydrOXYzine HCL, ipratropium-albuteroL, artificial tears, magnesium sulfate, magnesium sulfate, microfibrllar collagen, mupirocin, naloxone, ondansetron, oxyCODONE, sodium chloride, vancomycin     Results for orders placed or performed during the hospital encounter of 02/15/24 (from the past 24 hour(s))   Vancomycin   Result Value Ref Range    Vancomycin 17.0 5.0 - 20.0 ug/mL   Reticulocytes   Result Value Ref Range    Retic % 3.9 (H) 0.5 - 2.0 %    Retic Absolute 0.097 (H) 0.018 - 0.083 x10*6/uL    Reticulocyte Hemoglobin 29 28 - 38 pg    Immature Retic fraction 31.1 (H) <=16.0 %   CBC and Auto Differential   Result Value Ref Range    WBC 9.3 4.4 - 11.3 x10*3/uL    nRBC 6.4 (H) 0.0 - 0.0 /100 WBCs    RBC 2.51 (L) 4.00 - 5.20 x10*6/uL    Hemoglobin 7.4 (L) 12.0 - 16.0 g/dL    Hematocrit 22.2 (L) 36.0 - 46.0 %    MCV 88 80 - 100 fL    MCH 29.5 26.0 - 34.0 pg    MCHC  33.3 32.0 - 36.0 g/dL    RDW 20.2 (H) 11.5 - 14.5 %    Platelets 102 (L) 150 - 450 x10*3/uL    Immature Granulocytes %, Automated 6.1 (H) 0.0 - 0.9 %    Immature Granulocytes Absolute, Automated 0.57 0.00 - 0.70 x10*3/uL   Lactate Dehydrogenase   Result Value Ref Range    LDH 1,556 (H) 84 - 246 U/L   Hepatic function panel   Result Value Ref Range    Albumin 3.7 3.4 - 5.0 g/dL    Bilirubin, Total 2.0 (H) 0.0 - 1.2 mg/dL    Bilirubin, Direct 1.0 (H) 0.0 - 0.3 mg/dL    Alkaline Phosphatase 311 (H) 33 - 110 U/L    ALT 27 7 - 45 U/L    AST 56 (H) 9 - 39 U/L    Total Protein 6.0 (L) 6.4 - 8.2 g/dL   Calcium, ionized   Result Value Ref Range    POCT Calcium, Ionized 1.06 (L) 1.1 - 1.33 mmol/L   Basic Metabolic Panel   Result Value Ref Range    Glucose 221 (H) 74 - 99 mg/dL    Sodium 139 136 - 145 mmol/L    Potassium 3.8 3.5 - 5.3 mmol/L    Chloride 100 98 - 107 mmol/L    Bicarbonate 28 21 - 32 mmol/L    Anion Gap 15 10 - 20 mmol/L    Urea Nitrogen 15 6 - 23 mg/dL    Creatinine 0.73 0.50 - 1.05 mg/dL    eGFR >90 >60 mL/min/1.73m*2    Calcium 8.1 (L) 8.6 - 10.6 mg/dL   Phosphorus   Result Value Ref Range    Phosphorus 2.3 (L) 2.5 - 4.9 mg/dL   Manual Differential   Result Value Ref Range    Neutrophils %, Manual 89.7 40.0 - 80.0 %    Lymphocytes %, Manual 0.9 13.0 - 44.0 %    Monocytes %, Manual 9.4 2.0 - 10.0 %    Eosinophils %, Manual 0.0 0.0 - 6.0 %    Basophils %, Manual 0.0 0.0 - 2.0 %    Seg Neutrophils Absolute, Manual 8.34 (H) 1.20 - 7.00 x10*3/uL    Lymphocytes Absolute, Manual 0.08 (L) 1.20 - 4.80 x10*3/uL    Monocytes Absolute, Manual 0.87 0.10 - 1.00 x10*3/uL    Eosinophils Absolute, Manual 0.00 0.00 - 0.70 x10*3/uL    Basophils Absolute, Manual 0.00 0.00 - 0.10 x10*3/uL    Total Cells Counted 117     RBC Morphology See Below     Polychromasia Mild     Hypochromia Mild     RBC Fragments Few     Alex Cells Few    Renal Function Panel   Result Value Ref Range    Glucose 218 (H) 74 - 99 mg/dL    Sodium 141 136 -  145 mmol/L    Potassium 3.9 3.5 - 5.3 mmol/L    Chloride 102 98 - 107 mmol/L    Bicarbonate 29 21 - 32 mmol/L    Anion Gap 14 10 - 20 mmol/L    Urea Nitrogen 14 6 - 23 mg/dL    Creatinine 0.70 0.50 - 1.05 mg/dL    eGFR >90 >60 mL/min/1.73m*2    Calcium 8.1 (L) 8.6 - 10.6 mg/dL    Phosphorus 2.0 (L) 2.5 - 4.9 mg/dL    Albumin 3.7 3.4 - 5.0 g/dL   ECMO VENOUS FULL PANEL   Result Value Ref Range    POCT pH, Venous ECMO 7.40 Reference range not established pH    POCT pCO2, Venous ECMO 47 Reference range not established mm Hg    POCT pO2,  Venous  ECMO 39 Reference range not established mm Hg    POCT SO2, Venous ECMO 70 Reference range not established %    POCT Oxy Hemoglobin, Venous ECMO 66.8 Reference range not established %    POCT Hematocrit Calculated, Venous ECMO 24.0 (L) 36.0 - 46.0 %    POCT Sodium, Venous ECMO 138 136 - 145 mmol/L    POCT Potassium, Venous ECMO 3.8 3.5 - 5.3 mmol/L    POCT Chloride, Venous ECMO 103 98 - 107 mmol/L    POCT Ionized Calcium, Venous ECMO 1.08 (L) 1.10 - 1.33 mmol/L    POCT Glucose, Venous ECMO 232 (H) 74 - 99 mg/dL    POCT Lactate Venous ECMO 1.0 0.4 - 2.0 mmol/L    POCT Base Excess, Venous ECMO 3.8 Reference range not established mmol/L    POCT HCO3 Calculated, Venous ECMO 29.1 Reference range not established mmol/L    POCT Hemoglobin, Venous ECMO 7.9 (L) 12.0 - 16.0 g/dL    POCT Anion Gap, Venous ECMO 10 Reference range not established mmo/L    Patient Temperature 37.0 degrees Celsius    FiO2 100 %   ECMO ARTERIAL FULL PANEL   Result Value Ref Range    POCT pH, Arterial ECMO 7.45 Reference range not established pH    POCT pCO2, Arterial ECMO 41 Reference range not established mm Hg    POCT pO2,  Arterial ECMO 405 Reference range not established mm Hg    POCT SO2, Arterial ECMO 100 Reference range not established %    POCT Oxy Hemoglobin, Arterial ECMO 96.2 Reference range not established %    POCT Hematocrit Calculated, Arterial ECMO 28.0 (L) 36.0 - 46.0 %    POCT Sodium, Arterial   ECMO      POCT Potassium, Arterial  ECMO 3.9 3.5 - 5.3 mmol/L    POCT Chloride, Arterial  ECMO 104 98 - 107 mmol/L    POCT Ionized Calcium, Arterial  ECMO 1.05 (L) 1.10 - 1.33 mmol/L    POCT Glucose, Arterial  ECMO 238 (H) 74 - 99 mg/dL    POCT Lactate Arterial  ECMO 1.2 0.4 - 2.0 mmol/L    POCT Base Excess, Arterial ECMO 4.1 Reference range not established mmol/L    POCT HCO3 Calculated, Arterial ECMO 28.5 Reference range not established mmol/L    POCT Hemoglobin, Arterial  ECMO 9.3 (L) 12.0 - 16.0 g/dL    POCT Anion Gap, Arterial  ECMO      Patient Temperature 37.0 degrees Celsius    FiO2 100 %   POCT GLUCOSE   Result Value Ref Range    POCT Glucose 261 (H) 74 - 99 mg/dL   Coagulation Screen   Result Value Ref Range    Protime 14.2 (H) 9.8 - 12.8 seconds    INR 1.3 (H) 0.9 - 1.1    aPTT 40 (H) 27 - 38 seconds   Heparin Assay   Result Value Ref Range    Heparin Unfractionated 0.2 See Comment Below for Therapeutic Ranges IU/mL   POCT GLUCOSE   Result Value Ref Range    POCT Glucose 239 (H) 74 - 99 mg/dL     *Note: Due to a large number of results and/or encounters for the requested time period, some results have not been displayed. A complete set of results can be found in Results Review.       Malnutrition Diagnosis Status: Declining  Malnutrition Diagnosis: Moderate malnutrition related to acute disease or injury  As Evidenced by: pt's reported intake likely meeting <75% of estimated needs for at least 7 days, previous 5% weight loss in 1 month, LOS 42.  I agree with the dietitian's malnutrition diagnosis.      Assessment/Plan   Ms. Yimi Bowles is a 34F with a PMHx sig for stage D HFrEF (PPCM) s/p ICD s/p CardioMEMs, hypothyroidism 2/2 thyroidectomy, obesity s/p gastric bypass (2017), and SLE who is s/p OHT (3/31/2022) with a post-OHT course complicated by RIJ/RUE DVTs, leukopenia, left groin seroma, and asymptomatic 2R ACR (11/2022) treated with steroids, s/p total hysterectomy (2023), Flu A (1/2024).      Originally presented to [facility] ED on 2/15/24 with complaints of N/V x 3 days and inability to keep medications down. Found to have acute systolic heart failure with primary graft failure and cardiogenic shock. Treated for suspected acute cellular vs. acute antibody mediated rejection; however, multiple cardiac biopsies negative for pathology indicating rejection or other causes of graft failure. Course has been complicated by multiple MCS devices for refractory cardiogenic shock (right femoral IABP: 2/18/24 - 3/1/24, Left femoral VA ECMO: 2/19/24 - 2/29/24, Right axillary impella 5.5: 3/1/24 - remains in place, 2nd right femoral VA ECMO: 3/27/24 - remains in place), ARF requiring RRT, acute liver injury, intubation due to hypoxic respiratory failure, severe hemolysis 2/2 to impella malposition, mild CMV viremia, bilateral provoked IJ DVTs, multiple episodes of epistaxis & coagulopathies requiring ongoing blood product transfusions, & HCAP.       Transferred to CTICU on 3/27/24 following right femoral VA ECMO placement due to worsening cardiogenic shock and multi-organ failure despite Impella 5.5 support and multiple inotropes. Attempting for retransplantation of heart and new kidney transplantation.     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  #Acute renal failure requiring RRT   #Acute transaminitis  #Coagulopathy  #Thrombocytopenia   #Anemia   #Provoked bilateral IJ DVTs    #Left SFA occlusion   #Malnutrition  #Delirium/Anxiety  #HCAP  #UTI w/ VRE        Donor/Recipient Infectious history:  CMV: -/+ (low grade CMV viremia w/ levels < 35 on 3/1/24, repeat CMV levels remain negative since 4/7/24)  Toxo: -/-   Hep C: -/-     Rejection/Prophylaxis (transplants):  - Steroids: Continue 10mg PO prednisone daily (risk for adrenal insufficiency if stopped)  - Tacrolimus: stopped on 4/9/24 due to infection   - Myfortic acid: stopped on  4/9/24 due to infection  - Antifungals: nystatin 500,000units Q6  - Antivirals: valcyte 450mg Q48hrs   - Anti PCP & Toxoplasmosis: bactrim 200-40mg daily      Last cardiac biopsy: 2/16/24 with ACR grade 1R and no AMR (extremely low C4d+ detected), 2/20/24 negative for ACR and AMR  Last HLA: 4/2/24 negative for class 1 or 2 antibodies   Last RHC: closing numbers on 3/16/24 /86(97) RA 20, PAP 40/33(37), C.O./C.I. 5.5/2.8, PVR 88, SVR 1115   Last LHC: 2/18/24 negative for CAV and CAD   Last TTE/BOB: 4/6/24 shows severe LVEF w/ global hypokinesis, severe RV dysfunction, mild-mod MR, mild TR and impella angled posteriorly   Osteopenia/osteoporosis prophylaxis: Vitamin D3 currently held. Continue calcium supplements  Peptic/gastric ulcer prophylaxis: Pantoprazole 40mg daily  CAV Prophylaxis: Holding Aspirin 81mg due to continued thrombocytopenia. Continue rosuvastatin daily.      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 Broad differential including SLE flair, giant cell vasculitis, CAV/CAD, viral cardiomyopathy, sarcoidosis, amyloidosis and allograft rejection amongst many others. 2xallograft biopsies on 2/16 & 2/20 remain negative for any significant pathology. Notably, both biopsies taken after rejection therapies implemented which may have reduced areas of graft damage.    - DSAs remain negative; however, patient may have non-HLA antibodies present. 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   - Negative CAV & CAD via LHC on 2/18/24   - Completed methylpred steroid pulse w/ 1g Q24 x 3 days (2/16-2/18) and prednisone taper   - Thymoglobulin doses: 2/18 & 2/19   - IVIG + PLEX Session: 2/18, 2/20, 3/7, 3/11 and 3/13    - Right femoral VA ECMO flowing 2.7L w/ FIO2 90% and sweep 3  - Right axillary impella for LV venting remains in place and set P2 w/ flows of 1.2-1.4  - Impella remains poorly positioned and appears to be in contact with mitral valve leaflets. LDH remains elevated but stable.   -  LFTs improved s/p ECMO cannulation  - INR elevated in setting of malnutrition, now normalized following vitamin K x 3 (last dose 4/9/24)   - Delirium and anxiety improving   - Sitting at edge of bed and standing daily w/ PT/OT & CTICU team assistance   - CT scan on 4/9/24 shows bilateral pulmonary infiltrates throughout lung fields, L>R, confirming HCAP. Unable to obtain adequate respiratory culture.   - UC from 4/9 also growing VRE   - Elevated fungitell beta-D serum level; normally indicative of invasive fungal infections vs. PCP infection. ID recommending rechecking level in 1xweek as previous IVIG may cause false elevation.   - Remains on broad spectrum antibiotics w/ IV vancomycin, micafungin and meropenem     Transplant Status:  - Was listed Status 1 for combined heart-kidney (listed in UNOS on 4/2/24); however, on 4/6 changed to UNOS Status 7 given development of new infection, coagulopathy, encephalopathy/delirium. Assessing upgrade in status daily.   - ABO status: O+  - CMV+/EBV+/Toxo-/Hep B-/Hep C-  - Prospective cross match with every donor offer  - Due to potential risk of hyperacute allograft rejection, induction plan will be 500mg solumedrol x 2 (followed by steroid taper) and thymoglobulin       Recommendations:  - Continue heparin purge through impella   - Would treat anemia conservatively, if hemodynamically stable with Hgb <7.0 then can hold off on further pRBC transfusions at this time.   - Agree with -1L via CVVH vs. SLED today   - Agree with new A-line placement to reduce infection risk from old line   - Repeat sputum culture if adequate sample able to be collected  - Discuss w/ ID using treatment dose bactrim if possible concern for PCP given elevated fungitell beta   - Continue broad spectrum antibiotics, including micafungin due to highly immunosuppressed state       Continue excellent care per CTICU team.      Patient was examined and discussed with [facility] and Transplant  Cardiology attending physician, Dr. Pretty Toussaint.    Heart Transplant Team:   [employer] Secure Chat  [id number] during day shift  [id number] during night shift     Keith Jain, CNP

## 2024-04-11 NOTE — PROGRESS NOTES
"Vancomycin Dosing by Pharmacy- FOLLOW UP    Charla Bowles is a 34 y.o. year old female who Pharmacy has been consulted for vancomycin dosing for pneumonia. Based on the patient's indication and renal status this patient is being dosed based on a goal trough/random level of 15-20.     Renal function is currently declining.    Current vancomycin dose: 1000 mg given every 12 hours    Most recent random level: 27->17 mcg/mL    Visit Vitals  /82 Comment: post: 92/73   Pulse (!) 118   Temp 36.5 °C (97.7 °F) (Temporal)   Resp (!) 29        Lab Results   Component Value Date    CREATININE 0.70 04/10/2024    CREATININE 0.73 04/09/2024    CREATININE 0.73 04/09/2024    CREATININE 0.74 04/08/2024        Patient weight is No results found for: \"PTWEIGHT\"    No results found for: \"CULTURE\"     I/O last 3 completed shifts:  In: 3466.6 (41.3 mL/kg) [P.O.:480; I.V.:796.6 (9.5 mL/kg); Blood:350; NG/GT:1540; IV Piggyback:300]  Out: 4385 (52.2 mL/kg) [Other:3983; Stool:402]  Weight: 84 kg   [unfilled]    Lab Results   Component Value Date    PATIENTTEMP 37.0 04/10/2024    PATIENTTEMP 37.0 04/10/2024    PATIENTTEMP 37.0 04/10/2024        Assessment/Plan    Above goal random/trough level. Orders placed for new vancomycin regimen of 1000mg every 24 hours to begin at 0700 .  The next level will be obtained on 4/12 at 5a. May be obtained sooner if clinically indicated.   Will continue to monitor renal function daily while on vancomycin and order serum creatinine at least every 48 hours if not already ordered.  Follow for continued vancomycin needs, clinical response, and signs/symptoms of toxicity.       Chuckie Wong, PharmD           "

## 2024-04-12 NOTE — PROGRESS NOTES
Occupational Therapy    Occupational Therapy Treatment    Name: Charla Bowles  MRN: [id number]  : 1990  Date: 24  Room:       Time Calculation  Start Time: 1338  Stop Time: 1443  Time Calculation (min): 60 min    Assessment:  End of Session Communication: Bedside nurse  End of Session Patient Position: Bed, 3 rail up, Alarm off, not on at start of session  Plan:  Treatment Interventions: ADL retraining, Functional transfer training, Endurance training, UE strengthening/ROM, Cognitive reorientation, Patient/family training, Equipment evaluation/education, Neuromuscular reeducation, Fine motor coordination activities, Compensatory technique education  OT Frequency: 5 times per week  OT Discharge Recommendations: High intensity level of continued care  Equipment Recommended upon Discharge:  (TBD)  OT Recommended Transfer Status: Assist of 2  OT - OK to Discharge: Yes    Subjective   General:  OT Last Visit  OT Received On: 24  Co-Treatment: PT  Co-Treatment Reason: Partial co tx with PT, on ECMO requiring skilled 2 person assist for safe mobility  Prior to Session Communication: Bedside nurse, Physician  Patient Position Received: Bed, 3 rail up, Alarm off, not on at start of session  General Comment: Pt supine in bed on arrival, agreeable. VA ECMO flow 3.03 (post 3.02), 90% FiO2, sweep 3, R axillary impella (P-2), flow 1.3, CVVH through ECMO. Sites examined pre, during and post mobility, stable and secure. 5 minutes non-billable time while waiting for provider to arrive prior to progressing mobility.     Precautions:  Hearing/Visual Limitations: WFL  Medical Precautions: Cardiac precautions, Fall precautions  Precautions Comment: R axillary impella precautions- no pushing/pulling with R UE, No lifting R UE above shoulder height, no R UE humeral EXT past plane of body, R femoral ECMO precautions, MAP 70-90, protective precautions.    Vitals:  Vital Signs  Heart Rate: 106 (post:  117)  Resp: (!) 42 (post: 27)  BP: 98/85 (post: 96/83)  MAP (mmHg): 90 (post: 89)    Lines/Tubes/Drains:  ECMO Cannulation Peripheral Right;Femoral artery;Femoral vein (Active)   Number of days: 15       Arterial Line 04/11/24 Right Radial (Active)   Number of days: 0       Ventricular Assist Device Impella 5.5 (Active)   Number of days: 41       NG/OG/Feeding Tube Nasojejunal Left nostril (Active)   Number of days: 4       Rectal Tube With balloon (Active)   Number of days: 2       Hemodialysis Cath 04/06/24 Triple lumen Right Non-tunneled catheter Jugular (Active)   Number of days: 6       Cognition:  Overall Cognitive Status: Within Functional Limits  Orientation Level: Oriented X4    Pain Assessment:  Pain Assessment  Pain Assessment:  (reported knee pain (L>R), not quantified \)     Objective     Bed Mobility/Transfers:     Bed Mobility  Bed Mobility: Yes  Bed Mobility 1  Bed Mobility 1: Supine to sitting, Sitting to supine  Level of Assistance 1: Dependent (x2)  Bed Mobility Comments 1: draw sheet, HOB elevated    Transfers  Transfer: Yes  Transfer 1  Transfer From 1: Sit to, Stand to  Transfer to 1: Sit, Stand  Transfer Level of Assistance 1: Moderate assistance (x2 assist)  Trials/Comments 1: bilat knees blocked     Therapy/Activity:      Therapeutic Activity  Therapeutic Activity Performed: Yes  Therapeutic Activity 1: Pt engaged in 10 minute guided imagery exercise supine in bed.  Therapeutic Activity 2: Pt engaged in journaling task supine in bed. Expressed increased pain and fatigue this date.  Therapeutic Activity 3: Pt tolerated ~11 minutes seated EOB with CS for safety. Tolerated well  Therapeutic Activity 4: Pt tolerated ~3 minutes static standing with min A x2. Unable to progress to steps or other dynamic tasks 2/2 fatigue and knee pain.       Outcome Measures:  [facility] Daily Activity  Putting on and taking off regular lower body clothing: A lot  Bathing (including washing, rinsing, drying): A  lot  Putting on and taking off regular upper body clothing: A lot  Toileting, which includes using toilet, bedpan or urinal: Total  Taking care of personal grooming such as brushing teeth: A little  Eating Meals: A little  Daily Activity - Total Score: 13     Education Documentation  Handouts, taught by Marcia Thomason OT at 4/12/2024  3:24 PM.  Learner: Patient  Readiness: Acceptance  Method: Explanation, Demonstration  Response: Verbalizes Understanding    Body Mechanics, taught by Marcia Thomason OT at 4/12/2024  3:24 PM.  Learner: Patient  Readiness: Acceptance  Method: Explanation, Demonstration  Response: Verbalizes Understanding    Precautions, taught by Marcia Thomason OT at 4/12/2024  3:24 PM.  Learner: Patient  Readiness: Acceptance  Method: Explanation, Demonstration  Response: Verbalizes Understanding    Home Exercise Program, taught by Marcia Thomason OT at 4/12/2024  3:24 PM.  Learner: Patient  Readiness: Acceptance  Method: Explanation, Demonstration  Response: Verbalizes Understanding    ADL Training, taught by Marcia Thomason OT at 4/12/2024  3:24 PM.  Learner: Patient  Readiness: Acceptance  Method: Explanation, Demonstration  Response: Verbalizes Understanding    Education Comments  No comments found.        Goals:  Encounter Problems       Encounter Problems (Active)       ADLs       Patient will complete daily grooming tasks in standing with LRAD with supervision level of assistance and PRN adaptive equipment. (Progressing)       Start:  03/01/24    Expected End:  04/16/24               ADLs       Patient with complete lower body dressing with stand by assist level of assistance donning and doffing all LE clothes  with PRN adaptive equipment (Not met)       Start:  03/04/24    Expected End:  03/18/24    Resolved:  04/02/24    Updated to: Patient with complete UB bathing with stand by assist level of assistance  with PRN adaptive equipment     Update reason: re eval          Patient will complete toileting including hygiene clothing management/hygiene with stand by assist level of assistance. (Not met)       Start:  03/04/24    Expected End:  03/18/24    Resolved:  04/02/24    Updated to: Patient will complete upper body dressing including with mod I level of assistance.    Update reason: re eval         Patient with complete UB bathing with stand by assist level of assistance  with PRN adaptive equipment (Progressing)       Start:  04/02/24    Expected End:  04/16/24                Patient will complete upper body dressing including with mod I level of assistance. (Progressing)       Start:  04/02/24    Expected End:  04/16/24                   BALANCE       Pt will increase dynamic standing tolerance to >10 min with supervision using LRAD during functional mobility/ADLs without LOB in order to improve activity tolerance and balance for self-care tasks.  (Progressing)       Start:  03/01/24    Expected End:  04/16/24               COGNITION/SAFETY       Patient will participate in cognitive activities to demonstrate WFL score on further cognitive assessments, including Medi-Cog, MoCA and remain A&O x3, CAM (-).  (Progressing)       Start:  03/01/24    Expected End:  04/16/24               EXERCISE/STRENGTHENING       Patient will be educated on BUE HEP for increased ADL/IADL performance. (Progressing)       Start:  03/01/24    Expected End:  04/16/24               MOBILITY       Patient will perform Functional mobility max Household distances/Community Distances with supervision level of assistance and least restrictive device in order to improve safety and functional mobility. (Progressing)       Start:  03/01/24    Expected End:  04/16/24 04/12/24 at 3:27 PM   Marcia Thomason, OT   [phone]

## 2024-04-12 NOTE — PROGRESS NOTES
Charla Bowles is a 34 y.o. female on day 57 of admission presenting with Cardiogenic shock (Multi).        Assessment/Plan   Principal Problem:    Cardiogenic shock (Multi)  Active Problems:    Heart transplant recipient (Multi)    ESRD (end stage renal disease) on dialysis (Multi)    Immunosuppression (Multi)    HIRAM (acute kidney injury) (CMS-HCC)    Acute passive congestion of liver    Hyponatremia    Iron deficiency anemia    Abdominal pain    Systolic congestive heart failure (Multi)    History of left ventricular assist device (Multi)    History of extracorporeal membrane oxygenation treatment    Moderate protein-calorie malnutrition (Multi)    Heart transplant as cause of abnormal reaction or later complication (Multi)    Cardiac transplant rejection (Multi)    Acute combined systolic (congestive) and diastolic (congestive) heart failure (Multi)    Patient requires ECMO support. Drainage cannula site, cannula, pump, oxygenator, return cannula and return cannula site clinically inspected. RPM and sweep reviewed and adjusted appropriate to clinical context. Anticoagulation status and intensity discussed. Plan for weaning, next clinical steps discussed with team and family. Discussed with cardiothoracic surgeon, perfusion, palliative care and physical/occupational therapy    ECMO Indication: Cardiogenic shock  ECMO Cannula Configuration: right femoral venous- right femoral arterial   ECMO circuit run from drainage cannula to pump to oxygenator to return cannula: Yes  Presence of cannula bleeding: None apparent  Presence of oxygenator clot: None apparent  Pre/post PaO2 adequate: Yes  LV Unloading/Pulse Pressure: right axillary Impella 5.5   Distal Perfusion: Present, distal signals  Anticoagulation Plan/Monitoring: Remains at lower therapeutic end of heparin assay 0.2 on heparin through impella purge.   Mobility Plan/Candidacy: OOBTC and stand with PT/OT today  Path to decannulation/anticipated  decannulation date: Currently status 7 on transplant list for heart/kidney. Hope to re-list if she continues to improve in the upcoming days  ECMO:     Most Recent Range Past 24hrs   Flow 3.02 Patient Flow (L/min)  Min: 3.01  Max: 3.2   Speed 3400 Circuit Flow (RPM)  Min: 3399  Max: 3400   Sweep 4 Sweep Gas Flow Rate (L/min)  Min: 3  Max: 4              I, as the attending critical care physician, have personally reviewed the recent patient history, physical exam, vital signs, significant labs, medications, imaging, and ECMO settings/flows of this critically ill patient. Using this information I will continue to actively manage this critically ill patient's extracorporeal membrane oxygenation (ECMO)/extracorporeal life support (ECLS) as documented in the assessment and plan above.           I spent 42 minutes in the professional and overall care of this patient.      Karley Dhillon MD

## 2024-04-12 NOTE — PROGRESS NOTES
Charla Bowles is a 34 y.o. female on day 56 of admission presenting with Cardiogenic shock (CMS/HCC).    Subjective   Interval History:  -Improving from mentation and WBC perspectives  -Reports feeling better overall as well including by breathing    Objective   Range of Vitals (last 24 hours)  Heart Rate:  []   Temp:  [36.5 °C (97.7 °F)-36.8 °C (98.2 °F)]   Resp:  [15-50]   BP: (100-103)/(92-97)   SpO2:  [75 %-100 %]   Daily Weight  04/06/24 : 84 kg (185 lb 3 oz)    Body mass index is 35.01 kg/m².    Physical Exam  GEN: More awake today, acutely ill-appearing.  HEENT: EOMI, anicteric sclera. No oral lesions appreciated today.  CV: Mechanical pump sounds, tachycardic.   RESP: on NC, tachypneic  ABD: Soft, little to no TTP in epigastrium today  EXT: Edema present bilaterally, WWP. Groin line site appear c/d/I; no evidence of acute infection  SKIN: No skin rash    Relevant Results  Labs  Results from last 72 hours   Lab Units 04/11/24  0534 04/10/24  0609 04/09/24  1229 04/09/24  0423   WBC AUTO x10*3/uL 9.3 10.9 13.0* 14.3*   HEMOGLOBIN g/dL 7.4* 7.5* 7.8* 6.4*   HEMATOCRIT % 22.2* 23.2* 23.9* 19.7*   PLATELETS AUTO x10*3/uL 102* 108* 122* 97*   NEUTROS PCT AUTO %  --  83.7  --   --    LYMPHO PCT MAN % 0.9  --   --  1.7   LYMPHS PCT AUTO %  --  3.7  --   --    MONO PCT MAN % 9.4  --   --  3.4   MONOS PCT AUTO %  --  7.5  --   --    EOSINO PCT MAN % 0.0  --   --  0.8   EOS PCT AUTO %  --  0.8  --   --      Results from last 72 hours   Lab Units 04/11/24  0540 04/11/24  0534 04/10/24  0609   SODIUM mmol/L 141 139 138   POTASSIUM mmol/L 3.9 3.8 3.8   CHLORIDE mmol/L 102 100 100   CO2 mmol/L 29 28 27   BUN mg/dL 14 15 14   CREATININE mg/dL 0.70 0.73 0.70   GLUCOSE mg/dL 218* 221* 177*   CALCIUM mg/dL 8.1* 8.1* 8.1*   ANION GAP mmol/L 14 15 15   EGFR mL/min/1.73m*2 >90 >90 >90   PHOSPHORUS mg/dL 2.0* 2.3* 3.0     Results from last 72 hours   Lab Units 04/11/24  0540 04/11/24  0534 04/10/24  0609  24  1229 24  0423   ALK PHOS U/L  --  311* 286*  --  280*   BILIRUBIN TOTAL mg/dL  --  2.0* 2.1*  --  2.0*   BILIRUBIN DIRECT mg/dL  --  1.0* 1.0*  --  1.2*   PROTEIN TOTAL g/dL  --  6.0* 5.9*  --  5.9*   ALT U/L  --  27 24  --  22   AST U/L  --  56* 54*  --  59*   ALBUMIN g/dL 3.7 3.7 3.8   < > 3.8    < > = values in this interval not displayed.     Estimated Creatinine Clearance: 112.4 mL/min (by C-G formula based on SCr of 0.7 mg/dL).  C-Reactive Protein   Date Value Ref Range Status   2024 4.27 (H) <1.00 mg/dL Final   02/15/2024 6.06 (H) <1.00 mg/dL Final     CRP   Date Value Ref Range Status   2023 0.68 mg/dL Final     Comment:     REF VALUE  < 1.00         Microbiology  Bacterial  - Bcx: NG  - Rcx: Throat araceli  - BCX: NG  - BCX: NG  - BCX: NG  - Ucx: VRE     Viral  - CMV PCR: not detected  - COVID, Flu: not detected  - EBV: negative    Fungal:  - BD  - Crypto Ag: negative  - Histo Ag: negative       Current IS:  -Tacro 3.5/4  -Pred 10     Imaging  Reviewed in Epic, including pending CT C/AP    Assessment/Plan   34yF with SLE; s/p hysterectomy; and HFrEF s/p ICD 2/2 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 She presented to the ED with N/V in 2024 and was found with graft failure and cardiogenic shock. Treated for acute cellular versus antibody-mediated rejection with pulse steroids, IVIG/plasmapheresis x5, and 2 doses of thymoglobulin in -3/2024, but repeated biopsies were negative for significant rejection. Over this time she has had refractory cardiogenic shock requiring impella and ECMO support with course c/b ARF requiring RRT, ALI, hemolysis in setting of impella positioning, bilateral internal jugular DVTs, and coagulopathies. 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 We were consulted for HoTN, leukocytosis, and AMS. Localizing symptoms for us have largely been epigastric abdominal pain, tachypnea, dry cough, and mild AMS.      Work-up most suggestive for VAP. Agree with stopping micafungin, and subsequently vancomycin given negative cultures and MRSA nares.     False positive BDGs are unfortunately common and may be due to IVIG (last given 3/18, can last for several weeks), dialysis filters, albumin products, and blood products, among other causes. She has had exposure to all of these recently. After further review this evening, given her exposures to common causes of BDG false positives, CT chest findings, and heavily immunocompromised state with likely substantial future immunosuppression, it may make most sense to obtain a PJP PCR of the sputum, as repeating a BDG in a week may still be falsely positive anyways for the reasons above.     Recommendations:  -Please obtain sputum PJP PCR for reasons above; may need to induce  -Continue current vancomycin, meropenem  -Agree with stopping micafungin  -Can consider stopping vancomycin in upcoming days given MRSA nares and negative cultures otherwise  -Reasonable to plan for a course for a VAP  -Prophylaxis: Bactrim, valgan 450 q48h     We will continue to follow. Please contact ID Team A (fellow Antolin Donovan MD or Wilmer Payton MD) via [employer] or [phone].     Jovany Nino MD

## 2024-04-12 NOTE — PROGRESS NOTES
"Charla Bowles is a 34 y.o. female on day 57 of admission presenting with Cardiogenic shock (Multi).    Subjective   Slept well overnight without precedex.        Objective     Physical Exam    Last Recorded Vitals  Blood pressure 99/87, pulse (!) 114, temperature 36.9 °C (98.4 °F), temperature source Temporal, resp. rate (!) 31, height 1.549 m (5' 0.98\"), weight 84 kg (185 lb 3 oz), SpO2 94%.  Intake/Output last 3 Shifts:  I/O last 3 completed shifts:  In: 4692.8 (55.9 mL/kg) [P.O.:1180; I.V.:632.8 (7.5 mL/kg); NG/GT:2130; IV Piggyback:750]  Out: 8338 (99.3 mL/kg) [Other:7638; Stool:700]  Weight: 84 kg     Relevant Results                         Assessment/Plan   Principal Problem:    Cardiogenic shock (Multi)  Active Problems:    Heart transplant recipient (Multi)    ESRD (end stage renal disease) on dialysis (Multi)    Immunosuppression (Multi)    HIRAM (acute kidney injury) (CMS-[facility])    Acute passive congestion of liver    Hyponatremia    Iron deficiency anemia    Abdominal pain    Systolic congestive heart failure (Multi)    History of left ventricular assist device (Multi)    History of extracorporeal membrane oxygenation treatment    Moderate protein-calorie malnutrition (Multi)    Heart transplant as cause of abnormal reaction or later complication (Multi)    Cardiac transplant rejection (Multi)    Acute combined systolic (congestive) and diastolic (congestive) heart failure (Multi)    Patient seen, evaluated, and discussed with the NILA.  I have personally obtained key components of the history and physical and have performed my own medical decision making.      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 Patient re-listed for heart transplant and kidney transplant in setting of cardiogenic shock and acute " renal failure; however, team made decision to move her to status 7 given concern for infection.      Neuro: More awake today and less confused, oriented x3 and conversational on rounds. Continue PT/OT, out of bed. Standing daily. Pain control. Encourage sleep and REST protocol. Will continue seroquel, melatonin at night. Adjunct therapies if wanted to normalize environment. Will discuss music, art, and pet therapy to keep her engaged during the day.   CV: Heart transplant, complicated by rejection now with cardiogenic shock requiring ECMO and Impella. Continue full support on ECMO, Impella to P2 with 1L flow, stable LDH. ECMO flows 3.2L 3400 RPM and continue heparin through Impella purge. Currently keeping Impella after discussion with multidisciplinary team.   Pulm: Continued tachypnea requiring nasal cannula today. ECMO sweep increased for respiratory acidosis. CT scan concerning for pneumonia, L > R. Will attempt to induce a sputum sample for culture. Increase bronchial hygiene as poor IS effort. Multidisciplinary discussion regarding bronchoscopy- concern that patient will be unable to quickly extubate post-procedurally which would hinder other rehabilitation efforts towards transplantation candidacy. Will defer bronch at this time as patient continues to improve on current antibiotic regimen.   : Acute kidney injury, currently on CRRT - continue. Appreciate Renal consult. Optimize electrolytes. Goal even to negative if tolerated with goal CVP 10   GI: Bowel regimen. Continue PPI. Poor appetite and intake, failed swallow evaluation with speech. Continue TF today.   Heme: History of DVT with acute thromboses, including clots visualized in bilateral Ijs; US consistent with prior findings. Coagulopathy improved today after vitamin K, supporting nutritional coagulopathy. No current signs of bleeding. Continue heparin through Impella purge today as heparin assay is 0.2 and hemoglobin remains stable. Maintain active  type and screen. Minimize transfusions given hopeful retransplantation and not immunosuppressed to minimize antibody formation.  ID: Improved leukocytosis today. Negative blood cultures to date, C diff negative. Continue coverage with meropenem with vancomycin, and micafungin; prophylactic Bactrim. ID following. Stopped myfortic and tacrolimus after multidisciplinary discussion. Discontinue micafungin today. Discussion with ID on risk/benefit of increasing bactrim dose to therapeutic PJP dosing. They do not recommend empirically doing this without bronch given patient is improving on current regimen.   Endo: Continue levothyroxine, recheck thyroid studies 4/16. SSI for glucose control.   Immunosuppression: Continue steroids, prophylaxis per transplant.      Lines: No PIV access currently. PICC team unable to visualize targets due to cephalic clots and infiltration edema. RIJ trialysis catheter placed 4/6.       Dispo: Continue CTICU care. Discussed with HF attending Dr. Toussaint on rounds. Patient and family updated at bedside.     This critically ill patient continues to be at risk for clinically significant deterioration / failure due to the above mentioned dysfunctional, unstable organ systems.  I have personally identified and managed all complex critical care issues to prevent aforementioned clinical deterioration.  Critical care time is spent at bedside and/or the immediate area and has included, but is not limited to, the review of diagnostic tests, labs, radiographs, serial assessments of hemodynamics, respiratory status, ventilatory management, review of consult team recommendations, and family updates.  Time spent in procedures and teaching are reported separately. Critical Care Time: 42 minutes       I spent 42 minutes in the professional and overall care of this patient.      Karley Dhillon MD

## 2024-04-12 NOTE — PROGRESS NOTES
CTICU Progress Note  Charla Bowles/[id number]    Admit Date: 2/15/2024  Hospital Length of Stay: 57   ICU Length of Stay: 15d 12h   CT SURGEON: Dr. Witt    SUBJECTIVE:   Poor sleep  Net negative    MEDICATIONS  Infusions:  [Held by provider] dexmedeTOMIDine, Last Rate: Stopped (04/11/24 0630)  heparin Impella Purge 25 units/mL in 500 mL D5W, Last Rate: 10 mL/hr (04/12/24 0400)  lactated Ringer's, Last Rate: 5 mL/hr (04/12/24 0400)  PrismaSol 4/2.5, Last Rate: 2,400 mL/hr (04/09/24 1535)  [Held by provider] sodium bicarbonate infusion Impella Purge 25 mEq/1000 mL D5W, Last Rate: Stopped (04/09/24 1200)      Scheduled:  acetaminophen, 650 mg, q6h  bisacodyl, 10 mg, Once  calcium carbonate-vitamin D3, 1 tablet, Daily  cyanocobalamin, 500 mcg, Daily  darbepoetin floyd, 100 mcg, q14 days  ferrous sulfate (325 mg ferrous sulfate), 65 mg of iron, Daily with breakfast  folic acid, 1 mg, Daily  heparin (porcine), 5,000 Units, q8h  insulin lispro, 0-15 Units, q4h  levothyroxine, 250 mcg, Daily  lidocaine, 1 patch, Daily  melatonin, 10 mg, Daily  meropenem, 2 g, q12h  micafungin, 100 mg, q24h  multivitamin with minerals, 1 tablet, Daily  nystatin, 5 mL, q6h  oxygen, , Continuous - Inhalation  pantoprazole, 40 mg, Daily  predniSONE, 10 mg, Daily  [Held by provider] psyllium, 1 packet, Daily  QUEtiapine, 50 mg, Nightly  rosuvastatin, 10 mg, Nightly  [Held by provider] sennosides-docusate sodium, 1 tablet, BID  sod phos di, mono-K phos mono, 500 mg, 4x daily  sulfamethoxazole-trimethoprim, 80 mg of trimethoprim, Daily  valGANciclovir, 450 mg, q48h  vancomycin, 1,000 mg, q24h      PRN:  alteplase, 2 mg, PRN  bisacodyl, 10 mg, Daily PRN  calcium gluconate, 1 g, q6h PRN  calcium gluconate, 2 g, q6h PRN  dextrose, 25 g, q15 min PRN  dextrose, 25 g, q15 min PRN   Or  glucagon, 1 mg, q15 min PRN  dextrose, 25 g, q15 min PRN   Or  glucagon, 1 mg, q15 min PRN  hydrALAZINE, 5 mg, q4h PRN  hydrOXYzine HCL, 25 mg, q6h  "PRN  ipratropium-albuteroL, 3 mL, q6h PRN  artificial tears, 2 drop, PRN  magnesium sulfate, 2 g, q6h PRN  magnesium sulfate, 4 g, q6h PRN  microfibrllar collagen, , PRN  mupirocin, , BID PRN  naloxone, 0.2 mg, q5 min PRN  ondansetron, 4 mg, q4h PRN  oxyCODONE, 5 mg, q6h PRN  sodium chloride, 1 spray, 4x daily PRN  vancomycin, , Daily PRN        PHYSICAL EXAM:   Visit Vitals  BP (!) 103/92 (BP Location: Right arm, Patient Position: Lying) Comment: Post: 102/85   Pulse (!) 121   Temp 36.5 °C (97.7 °F) (Temporal)   Resp 18   Ht 1.549 m (5' 0.98\")   Wt 84 kg (185 lb 3 oz)   SpO2 (!) 89%   BMI 35.01 kg/m²   OB Status Hysterectomy   Smoking Status Never   BSA 1.9 m²     Wt Readings from Last 5 Encounters:   04/06/24 84 kg (185 lb 3 oz)   12/07/23 92.1 kg (203 lb)   12/01/23 93 kg (205 lb)   11/29/23 92.9 kg (204 lb 12.8 oz)   11/09/23 91.3 kg (201 lb 3.2 oz)     INTAKE/OUTPUT:  I/O last 3 completed shifts:  In: 4692.8 (55.9 mL/kg) [P.O.:1180; I.V.:632.8 (7.5 mL/kg); NG/GT:2130; IV Piggyback:750]  Out: 8338 (99.3 mL/kg) [Other:7638; Stool:700]  Weight: 84 kg        LDA:  ECMO Cannulation Peripheral Right;Femoral artery;Femoral vein (Active)   Placement Date/Time: 03/27/24 1700   ECMO Type: Peripheral  Cannulation Site: Right;Femoral artery;Femoral vein  Line Securement: Line secured in 2 locations;Securement device;Sutures   Number of days: 10       Arterial Line 03/27/24 Right Radial (Active)   Placement Date/Time: 03/27/24 1545   Orientation: Right  Location: Radial   Number of days: 10       Ventricular Assist Device Impella 5.5 (Active)   Placement Date/Time: 03/01/24 1956   Placed by: Dr Viramontes  Hand Hygiene Completed: Yes  Site Location: Axilla right  VAD Type: Left ventricular assist device  VAD Brand: Impella 5.5   Number of days: 36       Hemodialysis Cath 04/06/24 Triple lumen Right Non-tunneled catheter Jugular (Active)   Placement Date/Time: 04/06/24 1500   Hand Hygiene Completed: Yes  Site Prep: Usual " sterile procedure followed  Site Prep Agent has Completely Dried Before Insertion: Yes  All 5 Sterile Barriers Used (Gloves, Gown, Cap, Mask, Large Sterile Drape): Yes ...   Number of days: 0     Physical Exam:   - CONSTITUTION: Critically ill on MCS  - NEUROLOGIC: A+O and CAM neg today, generally less confused. following in all 4 extremities.  Systemically weak   - CARDIOVASCULAR: ST, R fem VA ECMO, no bleeding at site. R axillary impella, no bleeding at site. No s/s infection at either site. +distal signals in bilateral lower extremities  - RESPIRATORY: Diminished bilateral lung sounds, symmetric chest expansion  - GI: Abdomen soft, non tender, non distended, +bowel sounds.  Diarrhea to FMS  - : Anuric, on CVVH via ECMO circuit  - EXTREMITIES: Warm and dry, no peripheral edema  - SKIN: Left femoral skin breakdown with dressing in place  - PSYCHIATRIC: Calm     Images: CXR c/f worsening pulmonary edema persisting    Invasive Hemodynamics:    Most Recent Range Past 24hrs   BP (Art) 82/73 Arterial Line BP 1  Min: 80/71  Max: 108/95   MAP(Art) 77 mmHg Arterial Line MAP 1 (mmHg)   Min: 74 mmHg  Max: 100 mmHg   RA/CVP   No data recorded   PA 41/34 No data recorded   PA(mean) 37 mmHg No data recorded   CO 5.5 L/min No data recorded   CI 2.8 L/min/m2 No data recorded   Mixed Venous 59.6 % SVO2 (%)  Min: 58.6 %  Max: 65 %   SVR  1115 (dyne*sec)/cm5 No data recorded     ECMO:     Most Recent Range Past 24hrs   Flow 3.15 Patient Flow (L/min)  Min: 3.02  Max: 3.16   Speed 3400 Circuit Flow (RPM)  Min: 3400  Max: 3401   Sweep 4 Sweep Gas Flow Rate (L/min)  Min: 3  Max: 4      Impella:      Most Recent Range Past 24hrs   Performance Level 2 P Level  Min: 2   Min taken time: 04/12/24 0700  Max: 2   Max taken time: 04/12/24 0700   Flow (L/min) 1 Flow (L/min)  Min: 0.8   Min taken time: 04/11/24 0800  Max: 1.6   Max taken time: 04/11/24 1600   Motor Current 114/74 Motor Current  Min: 111/75   Min taken time: 04/12/24 0200  Max:  152/90   Max taken time: 04/11/24 1200   Placement Signal No  Placement OK could not be evaluated. This SmartLink does not work with rows of the type: Custom List   Purge (mmHg) 502 Purge Pressure (mmHg)  Min: 439   Min taken time: 04/11/24 2300  Max: 785   Max taken time: 04/11/24 1800   Purge rate (mL/hr) 10 Purge Rate (mL/hr)  Min: 9.5   Min taken time: 04/11/24 1300  Max: 10   Max taken time: 04/12/24 0700        Daily Risk Screen:  Dialysis/Hemapheresis    Assessment/Plan   34F with a PMHx sig for stage D HFrEF (PPCM) s/p ICD s/p CardioMEMs, hypothyroidism 2/2 thyroidectomy, obesity s/p gastric bypass (2017), and SLE who is s/p OHT (3/31/2022) with a post-OHT course complicated by RIJ/RUE DVTs, leukopenia, left groin seroma, and asymptomatic 2R ACR (11/2022) treated with steroids, s/p total hysterectomy (2023), Flu A (1/2024).     She presented to [facility] ED on 2/15/24 with complaints of N/V x 3 days and inability to keep medications down. Found to have acute systolic heart failure with primary graft failure and cardiogenic shock. Treated for suspected acute cellular vs. acute antibody mediated rejection; however, multiple cardiac biopsies negative for signs of rejection or other causes of graft failure. Her course has been complicated by multiple MCS devices for refractory cardiogenic shock (right femoral IABP: 2/18/24 - 3/1/24, Left femoral VA ECMO: 2/19/24 - 2/29/24, Right axillary impella 5.5: 3/1/24 - remains in place, 2nd right femoral VA ECMO: 3/27/24 - remains in place), ARF requiring RRT, acute liver injury, intubation due to volume overload in setting of pulmonary edema, severe hemolysis 2/2 to impella malposition, mild CMV viremia, bilateral provoked IJ DVTs, multiple episodes of epistaxis & coagulopathies requiring ongoing blood product transfusions.        Charla was transferred to CTICU on 3/27/24 following right femoral VA ECMO placement due to worsening cardiogenic shock and multi-organ failure  despite Impella 5.5 support and multiple inotropes. She was listed Status 1 for heart/kidney on 4/2, however was deactivated (status 7) due to c/f sepsis.  She remains critically ill in the CTICU on MCS with ECMO and an Impella as well as CVVH.       NEURO:  She vacillates between being neuro intact with intermittent delirium and anxiety though is much improved over last few days. Insomnia supported with multimodals and REST protocol.  CAM negative this AM.  Sitting at side of bed and standing with PT- max assist to get into position but then min assist to maintain.-->  - Serial neuro and pain assessments  - Continue tylenol scheduled  - Continue lidoderm patch for back pain  - PRN oxy 5mg Q6   - Continue melatonin 10 mg HS   - Continue Seroquel 50 mg HS (Qtc 399 4/9)     - May use precedex at bedtime for sleep if needed  - PRN hydroxyzine  - PT/OT Consult; mobilize as able  - CAM ICU score qshift. Sleep/wake cycle hygiene  - REST protocol (CXR and labs after 6am)      ENT: Multiple episodes of epistaxis with interventions by ENT. Most recently in OR 3/27 with L nare packed. Packing removed 4/1. -->  - appreciate ENT recs  - PRN ocean spray and mupiricin for dry nasal passages  - PRN afrin      Cardiac: Patient has a history of heart transplant in March of 2022 with primary graft dysfunction treated for acute cellular and antibody rejection without improvement with negative biopsy with multiple MCS devices for refractory cardiogenic shock (right femoral IABP: 2/18/24 - 3/1/24; Left femoral VA ECMO: 2/19/24 - 2/29/24; Right axillary impella 5.5: 3/1/24 - ; 2nd right femoral VA ECMO: 3/27/24 - ). Elevated LDH and difficulty with flows despite multiple attempts at repositioning Impella previously.   Current ECMO settings RPMs 3190 flows ~3.5L with FiO2 85% and sweep 4; Impella 5.5 P2 with flows 1.2 LPM.  Remains sinus tachy and normotensive. -->  - Maintain goal MAP 70-90  - Continue full BiV support with ECMO  -  Continue Impella at P2, for LV Venting  - Holding rosuvastatin with mild uptrending in LFT  - Trend daily LDH   - Hold home amlodipine     Vascular: 3/27 arterial duplex with proximal SFA occlusion with collateral flow. C/f LLE ischemia resolved. Feet warm with intact motor exam. -->  - appreciate vascular surgery following     PULM: She has been intubated multiple times throughout hospital course for procedures. Acute respiratory failure persisting due to acute pulmonary edema and CT chest 4/9 with severe multifocal PNA. Gas exchange augmented by VA ECMO, occasionally on NC for comfort.  -->  - CXR daily  - Adjust sweep for pH 7.3 - 7.5  - Maintain SPO2 > 92%     GI: History of gastric bypass and MMF colitis. Shock liver originally resolved; most recently elevated r/t likely congestive hepatopathy that is improving on ECMO. Abdominal pain improved with reduced myfortic dosing. Previous c/f GI bleed, likely blood from epistaxis; no current evidence of GI bleeding. H pylori negative, fecal calprotectin (elevated at 380), fecal lactoferrin (Positive 03/23/24). Poor nutritional intake.  Liver ultrasound 4/6 unconcerning. Dobhoff placed by ENT 4/8 under fiberoptic visualization.  Concern for aspiration in setting of PNA, limited SLP eval 4/10 was concerning for coughing with solids and they recommended sips & chips only for now.  Tolerating goal TF and continued liquid stool. -->  - Continue calcitriol 0.5mg daily, multivitamin, calcium carbonate 1,250mg BID  - Continue PPI daily  - Continue TF to goal. Adding fiber today. Will assess for change of formula this weekend if no improvement  - HOLD PO diet per SLP rec's, sips and chips ok   - Hold Ensure Clear TID and magic cup  - Hold miralax BID & senna-colace BID     :  No history, baseline Cr 1.2-1.3. HIRAM originally on CVVH then with renal recovery but then again worsened and now dialysis dependent and failed diuretic challenges. Hypervolemia improved but continued  pulmonary edema. Hypophosphatemia acceptably repleted.  -->  - RFP as clinically indicated, replete electrolytes per CTICU protocol.   - Continue CVVH with goal removal -500 to 1000 pending CVP goal 8-10   - Continue Sodium Phos 500 mg 4x daily  - Nephrology Following     ENDO: History of hypothyroidism and prediabetes. TSH elevated originally to malabsorption then with dosing adjusted by endo; TSH (3/17): 20.74, T4 1.11, T3: 1.4, improved 4/1; TSH 10.13, T4 0.70. 4/8: TSH 19.48, T3 0.8, T4 0.68. Steroid induced hyperglycemia acceptable glycemic control on SSI. -->  - Maintain BG <180 with hypoglycemia protocol  - Continue SSI #1 AC/HS   - Continue Synthroid 250mcg daily.   - Recheck TFT's 4/16 (ordered)   - Appreciate Endocrine Consult      HEME: History of iron deficiency anemia and remote DVT; again with acute DVT LIJ and SVT left cephalic vein and right cephalic vein. Acute blood loss anemia with repeated transfusions with significant hemolysis but no evidence of active bleeding; last received RBC 4/5. Stable thrombocytopenia persisting; last PF4 negative 3/30. No recent transfusion requirement. Coagulopathy r/t likely poor nutrition resolved with vit K x 3 doses (last 4/10). Heparin assay 0.2 on SQH and purge alone.  -->  - Continue ferrous sulfate daily  - Avoid unnecessarily transfusing, HGB > 7.0  - Hold systemic heparin with coagulopathy & recent anemia, re-eval daily. Continue SQH  - Continue heparin purge in the Impella.   - Trend coags daily  - SCDs for DVT prophylaxis  - Aranesp every 2 weeks  - Last type and screen: 4/11     Rejection/prophylaxis: S/p heart transplant 3/31/2022. Induction basiliximab. Donor HCV -, toxo -/-, CMV -/+. Mild ACR with +CD4 and negative HLAs however clinical presentation concern for acute cellular vs AMR rejection/stuttering rejection completed courses of thymo/PLEX/IVIG without clinical improvement.  With consideration to progressive graft failure and concern for infection  limiting re-transplant at this time, we are holding tacro and myfortic (4/9 - ).  - Steroids: Continue PO prednisone 10 mg daily  - Hold Tacrolimus: 4.0 mg AM/3.5 mg PM w/ daily levels drawn @ 0600  - Hold Tacrolimus goal troughs: 8-10  - Hold Myfortic acid: 360mg BID.  (Not starting MMF d/t hx of colitis)  - Antifungals: nystatin 500,000units Q6  - Antivirals: valcyte 450mg Q48hrs  - Anti PCP & Toxoplasmosis: continue SS Bactrim with c/f PNA     ID: C/f previous yeast infection. BC 3/27 NGTD final. MRSA screen negative 3/28. Episode of hypotension on 4/1, pan cultured and empiric Vanco/Zosyn started.  Patient was shivering 4/3, concern for risk of infection. Blood cultures sent 4/3, NGTD. Zosyn (4/1- 4/7) then broadened to Susan (4-7 - ) and natalie started (4/7 - ). CT chest 4/9 demonstrates severe multifocal PNA.  UA culture with enterococcus but difficult to define as infection given anuric state.  Beta-D glucan mildly elevated 195 thought to be 2/2 recent IVIG though could also be indicative of PJP.  CXR grossly unchanged with multifocal pna. Uptrending leukocytosis again.  -->  - Trend temp q4h  - dc natalie  - Continue Susan (4/7 - )  - Continue Vanco (4/6 - ). Discussing changing to linezolid for VRE with ID--> do not treat today per ID. Recommending repeat UA but no urine to send.  - Unable to obtain better sputum sample and avoiding NG placement with epistaxis history. Discussing benefit/risk of intubating and bronching for sample vs empirically treating with treatment dose of bactrim. Awaiting ID recs and multidisciplinary discussion.--> recommending not to empirically treat for PJP and consider bronch for better sample. Concern about intubating for bronch and potential for being able to extubated.  - Follow up cxs      Skin: Left groin wound from previous ECMO with wound consulted. Wound cx with NG 3/15.   - Preventative Mepilex dressings in place on sacrum and heels  - Change preventative Mepilex weekly or more  frequently as indicated (when moist/soiled)   - Every shift skin assessment per nursing and weekly ICU skin rounds  - Moisture barrier to be applied with christopher care  - Active skin problems addressed with nursing on daily rounds  - wound recs from note 3/15 ordered      Proph:  SCDs  SQH & heparin purge   PPI     G: Lines  RIJ Trialysis - 4/6  R radial maxwell 4/11  PIV x2    Restraints: Not indicated    Code status: Full Code    I personally spent 50 minutes of critical care time directly and personally managing the patient exclusive of separately billable procedures.     A,B,C,D,E,F,G: reviewed.    A&P reviewed on multidisciplinary critical care rounds      Dispo: CTICU care for now.    CTICU TEAM PHONE [phone]

## 2024-04-12 NOTE — PROGRESS NOTES
Subjective   Patient seen during evening rounds.  Patient's mother present  Patient supine in bed and in no acute distress  Reviewed recent cultures and diagnostic testing  Remains extubated  Has not mobilized sputum  Had traumatic nasal tracheal aspirate several days ago but no sample available      Objective   Range of Vitals (last 24 hours)  Heart Rate:  []   Temp:  [36.5 °C (97.7 °F)-37.1 °C (98.8 °F)]   Resp:  [18-45]   BP: (98-99)/(85-87)   SpO2:  [85 %-94 %]   Daily Weight  04/06/24 : 84 kg (185 lb 3 oz)    Body mass index is 35.01 kg/m².    Physical Exam  Alert and speaking in complete sentences  Was recounting nasotracheal suction and epistaxis  Currently in no acute distress  Bridled Dobbhoff tube in place on  Anterior chest clear  Impella heart sounds  ECMO cannula and pumps in place  Moderate palmar paleness      Results  Labs  Results from last 72 hours   Lab Units 04/12/24  1237 04/12/24  0014 04/11/24  0534 04/10/24  0609   WBC AUTO x10*3/uL 14.6* 13.1* 9.3 10.9   HEMOGLOBIN g/dL 7.4* 7.8* 7.4* 7.5*   HEMATOCRIT % 23.0* 22.4* 22.2* 23.2*   PLATELETS AUTO x10*3/uL 136* 118* 102* 108*   NEUTROS PCT AUTO %  --  79.2  --  83.7   LYMPHO PCT MAN %  --   --  0.9  --    LYMPHS PCT AUTO %  --  2.7  --  3.7   MONO PCT MAN %  --   --  9.4  --    MONOS PCT AUTO %  --  11.1  --  7.5   EOSINO PCT MAN %  --   --  0.0  --    EOS PCT AUTO %  --  0.5  --  0.8     Results from last 72 hours   Lab Units 04/12/24  1237 04/12/24  0017 04/11/24  0540   SODIUM mmol/L 140 141 141   POTASSIUM mmol/L 4.0 3.9 3.9   CHLORIDE mmol/L 100 99 102   CO2 mmol/L 28 28 29   BUN mg/dL 15 16 14   CREATININE mg/dL 0.69 0.75 0.70   GLUCOSE mg/dL 113* 135* 218*   CALCIUM mg/dL 8.3* 8.5* 8.1*   ANION GAP mmol/L 16 18 14   EGFR mL/min/1.73m*2 >90 >90 >90   PHOSPHORUS mg/dL 2.3* 2.7 2.0*     Results from last 72 hours   Lab Units 04/12/24  1237 04/12/24  0017 04/11/24  0540 04/11/24  0534 04/10/24  0609   ALK PHOS U/L  --  340*  --  311*  286*   BILIRUBIN TOTAL mg/dL  --  2.2*  --  2.0* 2.1*   BILIRUBIN DIRECT mg/dL  --  1.1*  --  1.0* 1.0*   PROTEIN TOTAL g/dL  --  6.8  --  6.0* 5.9*   ALT U/L  --  29  --  27 24   AST U/L  --  60*  --  56* 54*   ALBUMIN g/dL 4.0 4.1  4.2 3.7 3.7 3.8     Estimated Creatinine Clearance: 114.1 mL/min (by C-G formula based on SCr of 0.69 mg/dL).  C-Reactive Protein   Date Value Ref Range Status   03/27/2024 4.27 (H) <1.00 mg/dL Final   02/15/2024 6.06 (H) <1.00 mg/dL Final     CRP   Date Value Ref Range Status   01/18/2023 0.68 mg/dL Final     Comment:     REF VALUE  < 1.00       Microbiology  Susceptibility data from last 14 days.  Collected Specimen Info Organism Ampicillin Ciprofloxacin Daptomycin Levofloxacin Linezolid Nitrofurantoin Penicillin Trimethoprim/Sulfamethoxazole Vancomycin   04/09/24 Urine from Straight Catheter Enterococcus faecium R R R R S S R R R   04/01/24 Fluid from SPUTUM Normal throat araceli                Assessment/Plan          34yF with SLE; s/p hysterectomy; and HFrEF s/p ICD 2/2 PPCM s/p 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 (CMV -/+, EBV+/+, Toxo -/-, Hep C -/-) c/b prior ACR 11/2022 treated with steroids. She presented to the ED with N/V in 2/2024 and was found with graft failure and cardiogenic shock. Treated for acute cellular versus antibody-mediated rejection with pulse steroids, IVIG/plasmapheresis x5, and 2 doses of thymoglobulin in 2-3/2024, but repeated biopsies were negative for significant rejection. Over this time she has had refractory cardiogenic shock requiring impella and ECMO support with course c/b ARF requiring RRT, ALI, hemolysis in setting of impella positioning, bilateral internal jugular DVTs, and coagulopathies.        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       ID were consulted for hypotension, leukocytosis, and AMS. Localizing symptoms for us have largely been  epigastric abdominal pain, tachypnea, dry cough, and mild AMS.        VAP  Positive beta-D-glucan  Heart failure         Work-up most suggestive for VAP. Agree with stopping micafungin, and subsequently vancomycin given negative cultures and MRSA nares.           False positive BDGs are unfortunately common and may be due to IVIG (last given 3/18, can last for several weeks), dialysis filters, albumin products, and blood products, among other causes. She has had exposure to all of these recently. After further review this evening, given her exposures to common causes of BDG false positives, CT chest findings, and heavily immunocompromised state with likely substantial future immunosuppression, it may make most sense to obtain a PJP PCR of the sputum, as repeating a BDG in a week may still be falsely positive anyways for the reasons above.    04/12/2024 Update:       Discussed case with patient and her mother and the primary team.  Clinical suspicion for PJP is low although patient is at high risk.  Also clinical suspicion lower given propensity for IVIG and other agents (see above) to cause false positive beta D glucan testing.  Ideally would do bronchoscopy to obtain deep respiratory sample for BAL viral PCR panel including pneumocystis.  Deep samples could also be examined by sputum/BAL cytology but this has been largely replaced by PCR testing on adequate sample.       Could do nasotracheal aspirate and send for PJP PCR designating the sample tracheal aspirate/fluid.  Would also send for BAL viral PCR panel to rule out other atypical pathogens (suspicion low).       Would not treat empirically for PJP at this time given paucity of evidence.  Also concerned that treatment for PCP even empiric would mandate 3-week induction followed by maintenance therapy.  Would not initiate empiric therapy unless committed to completing induction therapy followed by long-term maintenance therapy.  Also would need to consider risk  for PJP exacerbation when on high-dose immunosuppression posttransplant.  Might be forced to treatment doses at that time which might pose additional risks in the peritransplant setting.          At this time I think we can try to obtain deep respiratory sample with suctioning or via expectoration or via  hypertonic induction (and at the present time avoid traumatic nasotracheal aspiration given history of epistaxis and bronchoscopy).       Also empiric treatment in this setting is difficult and would need to consider whether or not other broad-spectrum antifungal should be included.  Thus without deeper specimen or additional microbiologic or molecular diagnostic testing, would not empirically treat for PJP or other fungi (micafungin or Amphotericin)    Recommendations:  1.  Collect sputum and or tracheal aspirate for PJP PCR and BAL viral panel designating sample tracheal aspirate.              May need to induce with hypertonic saline (available via respiratory therapy).                       Please provide specimen container and have patient cough and collect several sputum samples in one container.    2.  Continue current vancomycin, meropenem  3.  4/11/24 discontinued micafungin  4.  will advise on stopping vancomycin in upcoming days given MRSA nares and negative cultures otherwise  5.  Anticipate completing 7 to 10-day course of antibiotics for VAP.  Will advise  6.  Continue prophylaxis: Bactrim, valganciclovir 450 q48h  7.  Although beta D glucan may remain elevated from IVIG, would consider repeating serum beta D glucan on 4/15/24.    I spent 45 minutes in the professional and overall care of this patient.      Wilmer Payton MD

## 2024-04-12 NOTE — PROGRESS NOTES
"        INPATIENT TRANSPLANT NEPHROLOGY PROGRESS NOTE          REASON FOR CONSULT: CRRT mgt    SUBJECTION:  [facility] Procedure note  Pt alert this morning. SOB improving.     PHYCISCAL EXAMINATION:    Visit Vitals  BP (!) 103/92 (BP Location: Right arm, Patient Position: Lying) Comment: Post: 102/85   Pulse (!) 121   Temp 36.5 °C (97.7 °F) (Temporal)   Resp (!) 35   Ht 1.549 m (5' 0.98\")   Wt 84 kg (185 lb 3 oz)   SpO2 (!) 75%   BMI 35.01 kg/m²   OB Status Hysterectomy   Smoking Status Never   BSA 1.9 m²        04/10 0700 - 04/11 1859  In: 4311.2 [P.O.:1240; I.V.:691.2]  Out: 8413      Weight change:       Pulmonary:      Effort: Pulmonary effort is normal. No respiratory distress.      Breath sounds: Normal breath sounds.   Chest:      Comments: Right axillary impella site CDI   Abdominal:      General: Bowel sounds are normal.      Palpations: Abdomen is soft.      Tenderness: There is no abdominal tenderness.   Musculoskeletal:      -No LE edema     General: Skin is warm and dry.      Comments: Left groin ECMO cannulation site with drainage    Neurological:   A&OX3    MEDICATION LIST: REVIEWED    acetaminophen, 650 mg, q6h  bisacodyl, 10 mg, Once  calcium carbonate-vitamin D3, 1 tablet, Daily  cyanocobalamin, 500 mcg, Daily  darbepoetin floyd, 100 mcg, q14 days  ferrous sulfate (325 mg ferrous sulfate), 65 mg of iron, Daily with breakfast  folic acid, 1 mg, Daily  heparin (porcine), 5,000 Units, q8h  insulin lispro, 0-15 Units, q4h  insulin lispro, 0-5 Units, TID with meals  levothyroxine, 250 mcg, Daily  lidocaine, 1 patch, Daily  melatonin, 10 mg, Daily  meropenem, 2 g, q12h  micafungin, 100 mg, q24h  multivitamin with minerals, 1 tablet, Daily  nystatin, 5 mL, q6h  oxygen, , Continuous - Inhalation  pantoprazole, 40 mg, Daily  predniSONE, 10 mg, Daily  [Held by provider] psyllium, 1 packet, Daily  QUEtiapine, 50 mg, Nightly  rosuvastatin, 10 mg, Nightly  [Held by provider] sennosides-docusate sodium, 1 tablet, " BID  sod phos di, mono-K phos mono, 500 mg, 4x daily  sulfamethoxazole-trimethoprim, 80 mg of trimethoprim, Daily  valGANciclovir, 450 mg, q48h  vancomycin, 1,000 mg, q24h      [Held by provider] dexmedeTOMIDine, Last Rate: Stopped (04/11/24 0630)  heparin Impella Purge 25 units/mL in 500 mL D5W, Last Rate: 10 mL/hr (04/12/24 0000)  lactated Ringer's, Last Rate: 5 mL/hr (04/12/24 0000)  PrismaSol 4/2.5, Last Rate: 2,400 mL/hr (04/09/24 1535)  [Held by provider] sodium bicarbonate infusion Impella Purge 25 mEq/1000 mL D5W, Last Rate: Stopped (04/09/24 1200)      alteplase, 2 mg, PRN  bisacodyl, 10 mg, Daily PRN  calcium gluconate, 1 g, q6h PRN  calcium gluconate, 2 g, q6h PRN  dextrose, 25 g, q15 min PRN  dextrose, 25 g, q15 min PRN   Or  glucagon, 1 mg, q15 min PRN  dextrose, 25 g, q15 min PRN   Or  glucagon, 1 mg, q15 min PRN  hydrALAZINE, 5 mg, q4h PRN  hydrOXYzine HCL, 25 mg, q6h PRN  ipratropium-albuteroL, 3 mL, q6h PRN  artificial tears, 2 drop, PRN  magnesium sulfate, 2 g, q6h PRN  magnesium sulfate, 4 g, q6h PRN  microfibrllar collagen, , PRN  mupirocin, , BID PRN  naloxone, 0.2 mg, q5 min PRN  ondansetron, 4 mg, q4h PRN  oxyCODONE, 5 mg, q6h PRN  sodium chloride, 1 spray, 4x daily PRN  vancomycin, , Daily PRN        ALLERGY:  Allergies   Allergen Reactions    Dapagliflozin GI bleeding and Bleeding     UTI    Urinary tract infection    Empagliflozin Unknown    Myfortic [Mycophenolate Sodium] GI Upset    Topiramate Nausea Only and Nausea/vomiting    Latex Rash       LABS:  Results for orders placed or performed during the hospital encounter of 02/15/24 (from the past 24 hour(s))   Reticulocytes   Result Value Ref Range    Retic % 3.9 (H) 0.5 - 2.0 %    Retic Absolute 0.097 (H) 0.018 - 0.083 x10*6/uL    Reticulocyte Hemoglobin 29 28 - 38 pg    Immature Retic fraction 31.1 (H) <=16.0 %   CBC and Auto Differential   Result Value Ref Range    WBC 9.3 4.4 - 11.3 x10*3/uL    nRBC 6.4 (H) 0.0 - 0.0 /100 WBCs    RBC  2.51 (L) 4.00 - 5.20 x10*6/uL    Hemoglobin 7.4 (L) 12.0 - 16.0 g/dL    Hematocrit 22.2 (L) 36.0 - 46.0 %    MCV 88 80 - 100 fL    MCH 29.5 26.0 - 34.0 pg    MCHC 33.3 32.0 - 36.0 g/dL    RDW 20.2 (H) 11.5 - 14.5 %    Platelets 102 (L) 150 - 450 x10*3/uL    Immature Granulocytes %, Automated 6.1 (H) 0.0 - 0.9 %    Immature Granulocytes Absolute, Automated 0.57 0.00 - 0.70 x10*3/uL   Lactate Dehydrogenase   Result Value Ref Range    LDH 1,556 (H) 84 - 246 U/L   Hepatic function panel   Result Value Ref Range    Albumin 3.7 3.4 - 5.0 g/dL    Bilirubin, Total 2.0 (H) 0.0 - 1.2 mg/dL    Bilirubin, Direct 1.0 (H) 0.0 - 0.3 mg/dL    Alkaline Phosphatase 311 (H) 33 - 110 U/L    ALT 27 7 - 45 U/L    AST 56 (H) 9 - 39 U/L    Total Protein 6.0 (L) 6.4 - 8.2 g/dL   Calcium, ionized   Result Value Ref Range    POCT Calcium, Ionized 1.06 (L) 1.1 - 1.33 mmol/L   Basic Metabolic Panel   Result Value Ref Range    Glucose 221 (H) 74 - 99 mg/dL    Sodium 139 136 - 145 mmol/L    Potassium 3.8 3.5 - 5.3 mmol/L    Chloride 100 98 - 107 mmol/L    Bicarbonate 28 21 - 32 mmol/L    Anion Gap 15 10 - 20 mmol/L    Urea Nitrogen 15 6 - 23 mg/dL    Creatinine 0.73 0.50 - 1.05 mg/dL    eGFR >90 >60 mL/min/1.73m*2    Calcium 8.1 (L) 8.6 - 10.6 mg/dL   Phosphorus   Result Value Ref Range    Phosphorus 2.3 (L) 2.5 - 4.9 mg/dL   Manual Differential   Result Value Ref Range    Neutrophils %, Manual 89.7 40.0 - 80.0 %    Lymphocytes %, Manual 0.9 13.0 - 44.0 %    Monocytes %, Manual 9.4 2.0 - 10.0 %    Eosinophils %, Manual 0.0 0.0 - 6.0 %    Basophils %, Manual 0.0 0.0 - 2.0 %    Seg Neutrophils Absolute, Manual 8.34 (H) 1.20 - 7.00 x10*3/uL    Lymphocytes Absolute, Manual 0.08 (L) 1.20 - 4.80 x10*3/uL    Monocytes Absolute, Manual 0.87 0.10 - 1.00 x10*3/uL    Eosinophils Absolute, Manual 0.00 0.00 - 0.70 x10*3/uL    Basophils Absolute, Manual 0.00 0.00 - 0.10 x10*3/uL    Total Cells Counted 117     RBC Morphology See Below     Polychromasia Mild      Hypochromia Mild     RBC Fragments Few     [place] Cells Few    Renal Function Panel   Result Value Ref Range    Glucose 218 (H) 74 - 99 mg/dL    Sodium 141 136 - 145 mmol/L    Potassium 3.9 3.5 - 5.3 mmol/L    Chloride 102 98 - 107 mmol/L    Bicarbonate 29 21 - 32 mmol/L    Anion Gap 14 10 - 20 mmol/L    Urea Nitrogen 14 6 - 23 mg/dL    Creatinine 0.70 0.50 - 1.05 mg/dL    eGFR >90 >60 mL/min/1.73m*2    Calcium 8.1 (L) 8.6 - 10.6 mg/dL    Phosphorus 2.0 (L) 2.5 - 4.9 mg/dL    Albumin 3.7 3.4 - 5.0 g/dL   ECMO VENOUS FULL PANEL   Result Value Ref Range    POCT pH, Venous ECMO 7.40 Reference range not established pH    POCT pCO2, Venous ECMO 47 Reference range not established mm Hg    POCT pO2,  Venous  ECMO 39 Reference range not established mm Hg    POCT SO2, Venous ECMO 70 Reference range not established %    POCT Oxy Hemoglobin, Venous ECMO 66.8 Reference range not established %    POCT Hematocrit Calculated, Venous ECMO 24.0 (L) 36.0 - 46.0 %    POCT Sodium, Venous ECMO 138 136 - 145 mmol/L    POCT Potassium, Venous ECMO 3.8 3.5 - 5.3 mmol/L    POCT Chloride, Venous ECMO 103 98 - 107 mmol/L    POCT Ionized Calcium, Venous ECMO 1.08 (L) 1.10 - 1.33 mmol/L    POCT Glucose, Venous ECMO 232 (H) 74 - 99 mg/dL    POCT Lactate Venous ECMO 1.0 0.4 - 2.0 mmol/L    POCT Base Excess, Venous ECMO 3.8 Reference range not established mmol/L    POCT HCO3 Calculated, Venous ECMO 29.1 Reference range not established mmol/L    POCT Hemoglobin, Venous ECMO 7.9 (L) 12.0 - 16.0 g/dL    POCT Anion Gap, Venous ECMO 10 Reference range not established mmo/L    Patient Temperature 37.0 degrees Celsius    FiO2 100 %   ECMO ARTERIAL FULL PANEL   Result Value Ref Range    POCT pH, Arterial ECMO 7.45 Reference range not established pH    POCT pCO2, Arterial ECMO 41 Reference range not established mm Hg    POCT pO2,  Arterial ECMO 405 Reference range not established mm Hg    POCT SO2, Arterial ECMO 100 Reference range not established %    POCT  Oxy Hemoglobin, Arterial ECMO 96.2 Reference range not established %    POCT Hematocrit Calculated, Arterial ECMO 28.0 (L) 36.0 - 46.0 %    POCT Sodium, Arterial  ECMO      POCT Potassium, Arterial  ECMO 3.9 3.5 - 5.3 mmol/L    POCT Chloride, Arterial  ECMO 104 98 - 107 mmol/L    POCT Ionized Calcium, Arterial  ECMO 1.05 (L) 1.10 - 1.33 mmol/L    POCT Glucose, Arterial  ECMO 238 (H) 74 - 99 mg/dL    POCT Lactate Arterial  ECMO 1.2 0.4 - 2.0 mmol/L    POCT Base Excess, Arterial ECMO 4.1 Reference range not established mmol/L    POCT HCO3 Calculated, Arterial ECMO 28.5 Reference range not established mmol/L    POCT Hemoglobin, Arterial  ECMO 9.3 (L) 12.0 - 16.0 g/dL    POCT Anion Gap, Arterial  ECMO      Patient Temperature 37.0 degrees Celsius    FiO2 100 %   POCT GLUCOSE   Result Value Ref Range    POCT Glucose 261 (H) 74 - 99 mg/dL   Coagulation Screen   Result Value Ref Range    Protime 14.2 (H) 9.8 - 12.8 seconds    INR 1.3 (H) 0.9 - 1.1    aPTT 40 (H) 27 - 38 seconds   Heparin Assay   Result Value Ref Range    Heparin Unfractionated 0.2 See Comment Below for Therapeutic Ranges IU/mL   POCT GLUCOSE   Result Value Ref Range    POCT Glucose 239 (H) 74 - 99 mg/dL   POCT GLUCOSE   Result Value Ref Range    POCT Glucose 180 (H) 74 - 99 mg/dL   Type and screen   Result Value Ref Range    ABO TYPE O     Rh TYPE POS     ANTIBODY SCREEN NEG    Blood Gas Arterial Full Panel   Result Value Ref Range    POCT pH, Arterial 7.37 (L) 7.38 - 7.42 pH    POCT pCO2, Arterial 46 (H) 38 - 42 mm Hg    POCT pO2, Arterial 171 (H) 85 - 95 mm Hg    POCT SO2, Arterial 100 94 - 100 %    POCT Oxy Hemoglobin, Arterial 96.3 94.0 - 98.0 %    POCT Hematocrit Calculated, Arterial 25.0 (L) 36.0 - 46.0 %    POCT Sodium, Arterial 137 136 - 145 mmol/L    POCT Potassium, Arterial 4.4 3.5 - 5.3 mmol/L    POCT Chloride, Arterial 102 98 - 107 mmol/L    POCT Ionized Calcium, Arterial 1.11 1.10 - 1.33 mmol/L    POCT Glucose, Arterial 212 (H) 74 - 99 mg/dL     POCT Lactate, Arterial 1.7 0.4 - 2.0 mmol/L    POCT Base Excess, Arterial 1.1 -2.0 - 3.0 mmol/L    POCT HCO3 Calculated, Arterial 26.6 (H) 22.0 - 26.0 mmol/L    POCT Hemoglobin, Arterial 8.4 (L) 12.0 - 16.0 g/dL    POCT Anion Gap, Arterial 13 10 - 25 mmo/L    Patient Temperature 37.0 degrees Celsius    FiO2 21 %   CBC and Auto Differential   Result Value Ref Range    WBC 13.1 (H) 4.4 - 11.3 x10*3/uL    nRBC 10.0 (H) 0.0 - 0.0 /100 WBCs    RBC 2.64 (L) 4.00 - 5.20 x10*6/uL    Hemoglobin 7.8 (L) 12.0 - 16.0 g/dL    Hematocrit 22.4 (L) 36.0 - 46.0 %    MCV 85 80 - 100 fL    MCH 29.5 26.0 - 34.0 pg    MCHC 34.8 32.0 - 36.0 g/dL    RDW 19.8 (H) 11.5 - 14.5 %    Platelets 118 (L) 150 - 450 x10*3/uL    Neutrophils %      Immature Granulocytes %, Automated      Lymphocytes %      Monocytes %      Eosinophils %      Basophils %      Neutrophils Absolute      Lymphocytes Absolute      Monocytes Absolute      Eosinophils Absolute      Basophils Absolute     Heparin Assay, UFH   Result Value Ref Range    Heparin Unfractionated 0.2 See Comment Below for Therapeutic Ranges IU/mL   ECMO VENOUS FULL PANEL   Result Value Ref Range    POCT pH, Venous ECMO 7.30 Reference range not established pH    POCT pCO2, Venous ECMO 66 Reference range not established mm Hg    POCT pO2,  Venous  ECMO 32 Reference range not established mm Hg    POCT SO2, Venous ECMO 50 Reference range not established %    POCT Oxy Hemoglobin, Venous ECMO 48.4 Reference range not established %    POCT Hematocrit Calculated, Venous ECMO 24.0 (L) 36.0 - 46.0 %    POCT Sodium, Venous ECMO 139 136 - 145 mmol/L    POCT Potassium, Venous ECMO 4.0 3.5 - 5.3 mmol/L    POCT Chloride, Venous ECMO 102 98 - 107 mmol/L    POCT Ionized Calcium, Venous ECMO 1.12 1.10 - 1.33 mmol/L    POCT Glucose, Venous ECMO 140 (H) 74 - 99 mg/dL    POCT Lactate Venous ECMO 1.1 0.4 - 2.0 mmol/L    POCT Base Excess, Venous ECMO 5.1 Reference range not established mmol/L    POCT HCO3 Calculated,  Venous ECMO 32.5 Reference range not established mmol/L    POCT Hemoglobin, Venous ECMO 8.1 (L) 12.0 - 16.0 g/dL    POCT Anion Gap, Venous ECMO 9 Reference range not established mmo/L    Patient Temperature 37.0 degrees Celsius    FiO2 85 %   Coagulation Screen   Result Value Ref Range    Protime 15.5 (H) 9.8 - 12.8 seconds    INR 1.4 (H) 0.9 - 1.1    aPTT 45 (H) 27 - 38 seconds   Calcium, ionized   Result Value Ref Range    POCT Calcium, Ionized 0.94 (L) 1.1 - 1.33 mmol/L   ECMO ARTERIAL FULL PANEL   Result Value Ref Range    POCT pH, Arterial ECMO 7.34 Reference range not established pH    POCT pCO2, Arterial ECMO 51 Reference range not established mm Hg    POCT pO2,  Arterial ECMO 144 Reference range not established mm Hg    POCT SO2, Arterial ECMO 100 Reference range not established %    POCT Oxy Hemoglobin, Arterial ECMO 96.7 Reference range not established %    POCT Hematocrit Calculated, Arterial ECMO 40.0 36.0 - 46.0 %    POCT Sodium, Arterial  ECMO 135 (L) 136 - 145 mmol/L    POCT Potassium, Arterial  ECMO 3.8 3.5 - 5.3 mmol/L    POCT Chloride, Arterial  ECMO 102 98 - 107 mmol/L    POCT Ionized Calcium, Arterial  ECMO 1.07 (L) 1.10 - 1.33 mmol/L    POCT Glucose, Arterial  ECMO 132 (H) 74 - 99 mg/dL    POCT Lactate Arterial  ECMO 1.3 0.4 - 2.0 mmol/L    POCT Base Excess, Arterial ECMO 0.9 Reference range not established mmol/L    POCT HCO3 Calculated, Arterial ECMO 27.5 Reference range not established mmol/L    POCT Hemoglobin, Arterial  ECMO 13.4 12.0 - 16.0 g/dL    POCT Anion Gap, Arterial  ECMO 9 Reference range not established mmo/L    Patient Temperature 37.0 degrees Celsius    FiO2 85 %   Blood Gas Arterial Full Panel   Result Value Ref Range    POCT pH, Arterial 7.32 (L) 7.38 - 7.42 pH    POCT pCO2, Arterial 58 (H) 38 - 42 mm Hg    POCT pO2, Arterial 193 (H) 85 - 95 mm Hg    POCT SO2, Arterial 100 94 - 100 %    POCT Oxy Hemoglobin, Arterial 96.5 94.0 - 98.0 %    POCT Hematocrit Calculated, Arterial  25.0 (L) 36.0 - 46.0 %    POCT Sodium, Arterial 138 136 - 145 mmol/L    POCT Potassium, Arterial 4.1 3.5 - 5.3 mmol/L    POCT Chloride, Arterial 102 98 - 107 mmol/L    POCT Ionized Calcium, Arterial 1.13 1.10 - 1.33 mmol/L    POCT Glucose, Arterial 139 (H) 74 - 99 mg/dL    POCT Lactate, Arterial 1.1 0.4 - 2.0 mmol/L    POCT Base Excess, Arterial 3.1 (H) -2.0 - 3.0 mmol/L    POCT HCO3 Calculated, Arterial 29.9 (H) 22.0 - 26.0 mmol/L    POCT Hemoglobin, Arterial 8.3 (L) 12.0 - 16.0 g/dL    POCT Anion Gap, Arterial 10 10 - 25 mmo/L    Patient Temperature 37.0 degrees Celsius    FiO2 21 %   Reticulocytes   Result Value Ref Range    Retic % 4.1 (H) 0.5 - 2.0 %    Retic Absolute 0.107 (H) 0.018 - 0.083 x10*6/uL    Reticulocyte Hemoglobin 29 28 - 38 pg    Immature Retic fraction 36.3 (H) <=16.0 %   Lactate Dehydrogenase   Result Value Ref Range    LDH     Hepatic function panel   Result Value Ref Range    Albumin 4.1 3.4 - 5.0 g/dL    Bilirubin, Total 2.2 (H) 0.0 - 1.2 mg/dL    Bilirubin, Direct 1.1 (H) 0.0 - 0.3 mg/dL    Alkaline Phosphatase 340 (H) 33 - 110 U/L    ALT      AST 60 (H) 9 - 39 U/L    Total Protein 6.8 6.4 - 8.2 g/dL   Renal Function Panel   Result Value Ref Range    Glucose 135 (H) 74 - 99 mg/dL    Sodium 141 136 - 145 mmol/L    Potassium 3.9 3.5 - 5.3 mmol/L    Chloride 99 98 - 107 mmol/L    Bicarbonate 28 21 - 32 mmol/L    Anion Gap 18 10 - 20 mmol/L    Urea Nitrogen 16 6 - 23 mg/dL    Creatinine 0.75 0.50 - 1.05 mg/dL    eGFR >90 >60 mL/min/1.73m*2    Calcium 8.5 (L) 8.6 - 10.6 mg/dL    Phosphorus 2.7 2.5 - 4.9 mg/dL    Albumin 4.2 3.4 - 5.0 g/dL   Magnesium   Result Value Ref Range    Magnesium 2.62 (H) 1.60 - 2.40 mg/dL   ECMO ARTERIAL FULL PANEL   Result Value Ref Range    POCT pH, Arterial ECMO 7.35 Reference range not established pH    POCT pCO2, Arterial ECMO 52 Reference range not established mm Hg    POCT pO2,  Arterial ECMO 330 Reference range not established mm Hg    POCT SO2, Arterial ECMO 100  Reference range not established %    POCT Oxy Hemoglobin, Arterial ECMO 96.6 Reference range not established %    POCT Hematocrit Calculated, Arterial ECMO 25.0 (L) 36.0 - 46.0 %    POCT Sodium, Arterial  ECMO 138 136 - 145 mmol/L    POCT Potassium, Arterial  ECMO 4.0 3.5 - 5.3 mmol/L    POCT Chloride, Arterial  ECMO 102 98 - 107 mmol/L    POCT Ionized Calcium, Arterial  ECMO 1.12 1.10 - 1.33 mmol/L    POCT Glucose, Arterial  ECMO 131 (H) 74 - 99 mg/dL    POCT Lactate Arterial  ECMO 1.0 0.4 - 2.0 mmol/L    POCT Base Excess, Arterial ECMO 2.6 Reference range not established mmol/L    POCT HCO3 Calculated, Arterial ECMO 28.7 Reference range not established mmol/L    POCT Hemoglobin, Arterial  ECMO 8.2 (L) 12.0 - 16.0 g/dL    POCT Anion Gap, Arterial  ECMO 11 Reference range not established mmo/L    Patient Temperature 37.0 degrees Celsius    FiO2 100 %     *Note: Due to a large number of results and/or encounters for the requested time period, some results have not been displayed. A complete set of results can be found in Results Review.        ASSESSMENT AND PLAN:    Ms. Yimi Bowles is a 34F with a PMHx sig for stage D HFrEF (PPCM) s/p ICD s/p CardioMEMs, hypothyroidism 2/2 thyroidectomy, obesity s/p gastric bypass (2017), and SLE who is s/p OHT (3/31/2022) with a post-OHT course complicated by RIJ/RUE DVTs, leukopenia, left groin seroma, and asymptomatic 2R ACR (11/2022) treated with steroids, s/p total hysterectomy (2023), Flu A (1/2024).  Who initially presented on 2/15/2024 in the setting of acute systolic heart failure with the graft failure and cardiogenic shock.  Patient currently is on [facility] ECMO support and Impella with ongoing acute renal failure requiring CRRT.    Principal Problem:    Cardiogenic shock (CMS/[facility])  Active Problems:    Heart transplant recipient (CMS/[facility])    ESRD (end stage renal disease) on dialysis (CMS/[facility])    HIRAM (acute kidney injury) (CMS/[facility])    Acute passive congestion of liver     Hyponatremia    Iron deficiency anemia    Abdominal pain    Cardiac transplant rejection (CMS/[facility])    Acute combined systolic (congestive) and diastolic (congestive) heart failure (CMS/HCC)      CRRT Management Note:  #HIRAM-D, 2/2 ATN in setting of cardiogenic shock. Started on CRRT initially 2/19 and transitioned to iHD however unable to achieve optimal fluid management so transitioned back to CRRT   - Hemodynamically stable on full vent support with ECMO and Impella.   - cont [facility]. UFR per primary team.  -Please replete ionized calcium and phosphorus as per the CRRT protocol.  -Of note patient is currently status 7 for heart kidney transplant listed  on 4/2/2024.  Secondary to sepsis, pneumonia.

## 2024-04-12 NOTE — PROGRESS NOTES
[facility]  ADVANCED HEART FAILURE AND TRANSPLANT CARDIOLOGY   Charla Bowles/[id number]    Admit Date: 2/15/2024  Hospital Length of Stay: 57   ICU Length of Stay: 15d 12h   Primary Service: CTICU      Charla Bowles is a 34 y.o. female on day 57 of admission presenting with Cardiogenic shock (Multi).    Subjective   A/Ox4 this morning. Requesting that she get a break from CVVH because she feels that there is too much fluid being removed. She also requests another room as she feels claustrophobic in her current one. Denies any pain or difficulty breathing. She feels that her cough has gotten better.        Objective     Physical Exam  Constitutional:       Appearance: She is ill-appearing.   HENT:      Head: Normocephalic.      Comments: Not actively bleeding      Mouth/Throat:      Mouth: Mucous membranes are moist.      Pharynx: Oropharynx is clear. No oropharyngeal exudate or posterior oropharyngeal erythema.   Eyes:      Extraocular Movements: Extraocular movements intact.      Conjunctiva/sclera: Conjunctivae normal.      Pupils: Pupils are equal, round, and reactive to light.   Neck:      Vascular: No JVD.   Cardiovascular:      Rate and Rhythm: Tachycardia present.      Comments: Right axillary impella in place ~45cm at hub (no hematoma), Right femoral VA ECMO in place with reperfusion catheter (site appears clean and dry). Dopplerable radial and ulnar pulses bilaterally. Dopplerable PT pulses bilateral. Unable to doppler DP bilaterally.  Pulmonary:      Effort: No accessory muscle usage, respiratory distress or retractions.      Breath sounds: Normal breath sounds. No wheezing, rhonchi or rales.      Comments: RA. Tachypnea w/ RR 20-30. Small amount of blood tinged and purulent sputum.  Abdominal:      General: Abdomen is flat. Bowel sounds are normal. There is no distension.      Palpations: Abdomen is soft. There is no mass.      Hernia: No hernia is present.  "  Musculoskeletal:         General: No swelling (+1 BLE edema) or tenderness. Normal range of motion.      Cervical back: Full passive range of motion without pain.   Skin:     General: Skin is dry.      Capillary Refill: Capillary refill takes less than 2 seconds.      Coloration: Skin is not jaundiced.      Findings: Bruising and lesion (Left groin wound appears clean with granulation tissue. No signs of infection.) present. No erythema, laceration, rash or wound.   Neurological:      General: No focal deficit present.      Mental Status: She is alert and oriented to person, place, and time. She is confused.   Psychiatric:         Mood and Affect: Mood is anxious.         Behavior: Behavior normal.         Last Recorded Vitals  Blood pressure (!) 103/92, pulse (!) 121, temperature 36.5 °C (97.7 °F), temperature source Temporal, resp. rate 18, height 1.549 m (5' 0.98\"), weight 84 kg (185 lb 3 oz), SpO2 (!) 89%.  Intake/Output last 3 Shifts:  I/O last 3 completed shifts:  In: 4692.8 (55.9 mL/kg) [P.O.:1180; I.V.:632.8 (7.5 mL/kg); NG/GT:2130; IV Piggyback:750]  Out: 8338 (99.3 mL/kg) [Other:7638; Stool:700]  Weight: 84 kg     Relevant Results  Scheduled medications  acetaminophen, 650 mg, oral, q6h  calcium carbonate-vitamin D3, 1 tablet, oral, Daily  cyanocobalamin, 500 mcg, oral, Daily  darbepoetin floyd, 100 mcg, subcutaneous, q14 days  ferrous sulfate (325 mg ferrous sulfate), 65 mg of iron, oral, Daily with breakfast  folic acid, 1 mg, oral, Daily  heparin (porcine), 5,000 Units, subcutaneous, q8h  insulin lispro, 0-15 Units, subcutaneous, q4h  levothyroxine, 250 mcg, oral, Daily  lidocaine, 1 patch, transdermal, Daily  melatonin, 10 mg, oral, Daily  meropenem, 2 g, intravenous, q12h  micafungin, 100 mg, intravenous, q24h  multivitamin with minerals, 1 tablet, oral, Daily  nystatin, 5 mL, Swish & Swallow, q6h  oxygen, , inhalation, Continuous - Inhalation  pantoprazole, 40 mg, intravenous, Daily  predniSONE, 10 " mg, oral, Daily  QUEtiapine, 50 mg, oral, Nightly  rosuvastatin, 10 mg, oral, Nightly  sod phos di, mono-K phos mono, 500 mg, oral, 4x daily  sulfamethoxazole-trimethoprim, 80 mg of trimethoprim, oral, Daily  valGANciclovir, 450 mg, oral, q48h  vancomycin, 1,000 mg, intravenous, q24h      Continuous medications  [Held by provider] dexmedeTOMIDine, 0.1-1.5 mcg/kg/hr, Last Rate: Stopped (04/11/24 0630)  heparin Impella Purge 25 units/mL in 500 mL D5W, 10 mL/hr, Last Rate: 10 mL/hr (04/12/24 0400)  lactated Ringer's, 5 mL/hr, Last Rate: 5 mL/hr (04/12/24 0400)  PrismaSol 4/2.5, 2,400 mL/hr, Last Rate: 2,400 mL/hr (04/09/24 1535)  [Held by provider] sodium bicarbonate infusion Impella Purge 25 mEq/1000 mL D5W, 10 mL/hr, Last Rate: Stopped (04/09/24 1200)      PRN medications  PRN medications: alteplase, bisacodyl, calcium gluconate, calcium gluconate, dextrose, dextrose **OR** glucagon, dextrose **OR** glucagon, hydrALAZINE, hydrOXYzine HCL, ipratropium-albuteroL, artificial tears, magnesium sulfate, magnesium sulfate, microfibrllar collagen, mupirocin, naloxone, ondansetron, oxyCODONE, sodium chloride, vancomycin     Results for orders placed or performed during the hospital encounter of 02/15/24 (from the past 24 hour(s))   POCT GLUCOSE   Result Value Ref Range    POCT Glucose 261 (H) 74 - 99 mg/dL   Coagulation Screen   Result Value Ref Range    Protime 14.2 (H) 9.8 - 12.8 seconds    INR 1.3 (H) 0.9 - 1.1    aPTT 40 (H) 27 - 38 seconds   Heparin Assay   Result Value Ref Range    Heparin Unfractionated 0.2 See Comment Below for Therapeutic Ranges IU/mL   POCT GLUCOSE   Result Value Ref Range    POCT Glucose 239 (H) 74 - 99 mg/dL   POCT GLUCOSE   Result Value Ref Range    POCT Glucose 180 (H) 74 - 99 mg/dL   Type and screen   Result Value Ref Range    ABO TYPE O     Rh TYPE POS     ANTIBODY SCREEN NEG    Blood Gas Arterial Full Panel   Result Value Ref Range    POCT pH, Arterial 7.37 (L) 7.38 - 7.42 pH    POCT pCO2,  Arterial 46 (H) 38 - 42 mm Hg    POCT pO2, Arterial 171 (H) 85 - 95 mm Hg    POCT SO2, Arterial 100 94 - 100 %    POCT Oxy Hemoglobin, Arterial 96.3 94.0 - 98.0 %    POCT Hematocrit Calculated, Arterial 25.0 (L) 36.0 - 46.0 %    POCT Sodium, Arterial 137 136 - 145 mmol/L    POCT Potassium, Arterial 4.4 3.5 - 5.3 mmol/L    POCT Chloride, Arterial 102 98 - 107 mmol/L    POCT Ionized Calcium, Arterial 1.11 1.10 - 1.33 mmol/L    POCT Glucose, Arterial 212 (H) 74 - 99 mg/dL    POCT Lactate, Arterial 1.7 0.4 - 2.0 mmol/L    POCT Base Excess, Arterial 1.1 -2.0 - 3.0 mmol/L    POCT HCO3 Calculated, Arterial 26.6 (H) 22.0 - 26.0 mmol/L    POCT Hemoglobin, Arterial 8.4 (L) 12.0 - 16.0 g/dL    POCT Anion Gap, Arterial 13 10 - 25 mmo/L    Patient Temperature 37.0 degrees Celsius    FiO2 21 %   CBC and Auto Differential   Result Value Ref Range    WBC 13.1 (H) 4.4 - 11.3 x10*3/uL    nRBC 10.0 (H) 0.0 - 0.0 /100 WBCs    RBC 2.64 (L) 4.00 - 5.20 x10*6/uL    Hemoglobin 7.8 (L) 12.0 - 16.0 g/dL    Hematocrit 22.4 (L) 36.0 - 46.0 %    MCV 85 80 - 100 fL    MCH 29.5 26.0 - 34.0 pg    MCHC 34.8 32.0 - 36.0 g/dL    RDW 19.8 (H) 11.5 - 14.5 %    Platelets 118 (L) 150 - 450 x10*3/uL    Neutrophils % 79.2 40.0 - 80.0 %    Immature Granulocytes %, Automated 6.3 (H) 0.0 - 0.9 %    Lymphocytes % 2.7 13.0 - 44.0 %    Monocytes % 11.1 2.0 - 10.0 %    Eosinophils % 0.5 0.0 - 6.0 %    Basophils % 0.2 0.0 - 2.0 %    Neutrophils Absolute 10.38 (H) 1.20 - 7.70 x10*3/uL    Immature Granulocytes Absolute, Automated 0.82 (H) 0.00 - 0.70 x10*3/uL    Lymphocytes Absolute 0.35 (L) 1.20 - 4.80 x10*3/uL    Monocytes Absolute 1.45 (H) 0.10 - 1.00 x10*3/uL    Eosinophils Absolute 0.07 0.00 - 0.70 x10*3/uL    Basophils Absolute 0.03 0.00 - 0.10 x10*3/uL   Heparin Assay, UFH   Result Value Ref Range    Heparin Unfractionated 0.2 See Comment Below for Therapeutic Ranges IU/mL   ECMO VENOUS FULL PANEL   Result Value Ref Range    POCT pH, Venous ECMO 7.30  Reference range not established pH    POCT pCO2, Venous ECMO 66 Reference range not established mm Hg    POCT pO2,  Venous  ECMO 32 Reference range not established mm Hg    POCT SO2, Venous ECMO 50 Reference range not established %    POCT Oxy Hemoglobin, Venous ECMO 48.4 Reference range not established %    POCT Hematocrit Calculated, Venous ECMO 24.0 (L) 36.0 - 46.0 %    POCT Sodium, Venous ECMO 139 136 - 145 mmol/L    POCT Potassium, Venous ECMO 4.0 3.5 - 5.3 mmol/L    POCT Chloride, Venous ECMO 102 98 - 107 mmol/L    POCT Ionized Calcium, Venous ECMO 1.12 1.10 - 1.33 mmol/L    POCT Glucose, Venous ECMO 140 (H) 74 - 99 mg/dL    POCT Lactate Venous ECMO 1.1 0.4 - 2.0 mmol/L    POCT Base Excess, Venous ECMO 5.1 Reference range not established mmol/L    POCT HCO3 Calculated, Venous ECMO 32.5 Reference range not established mmol/L    POCT Hemoglobin, Venous ECMO 8.1 (L) 12.0 - 16.0 g/dL    POCT Anion Gap, Venous ECMO 9 Reference range not established mmo/L    Patient Temperature 37.0 degrees Celsius    FiO2 85 %   Coagulation Screen   Result Value Ref Range    Protime 15.5 (H) 9.8 - 12.8 seconds    INR 1.4 (H) 0.9 - 1.1    aPTT 45 (H) 27 - 38 seconds   Calcium, ionized   Result Value Ref Range    POCT Calcium, Ionized 0.94 (L) 1.1 - 1.33 mmol/L   Morphology   Result Value Ref Range    RBC Morphology See Below     Polychromasia Mild     Hypochromia Mild     RBC Fragments Few    ECMO ARTERIAL FULL PANEL   Result Value Ref Range    POCT pH, Arterial ECMO 7.34 Reference range not established pH    POCT pCO2, Arterial ECMO 51 Reference range not established mm Hg    POCT pO2,  Arterial ECMO 144 Reference range not established mm Hg    POCT SO2, Arterial ECMO 100 Reference range not established %    POCT Oxy Hemoglobin, Arterial ECMO 96.7 Reference range not established %    POCT Hematocrit Calculated, Arterial ECMO 40.0 36.0 - 46.0 %    POCT Sodium, Arterial  ECMO 135 (L) 136 - 145 mmol/L    POCT Potassium, Arterial  ECMO  3.8 3.5 - 5.3 mmol/L    POCT Chloride, Arterial  ECMO 102 98 - 107 mmol/L    POCT Ionized Calcium, Arterial  ECMO 1.07 (L) 1.10 - 1.33 mmol/L    POCT Glucose, Arterial  ECMO 132 (H) 74 - 99 mg/dL    POCT Lactate Arterial  ECMO 1.3 0.4 - 2.0 mmol/L    POCT Base Excess, Arterial ECMO 0.9 Reference range not established mmol/L    POCT HCO3 Calculated, Arterial ECMO 27.5 Reference range not established mmol/L    POCT Hemoglobin, Arterial  ECMO 13.4 12.0 - 16.0 g/dL    POCT Anion Gap, Arterial  ECMO 9 Reference range not established mmo/L    Patient Temperature 37.0 degrees Celsius    FiO2 85 %   Blood Gas Arterial Full Panel   Result Value Ref Range    POCT pH, Arterial 7.32 (L) 7.38 - 7.42 pH    POCT pCO2, Arterial 58 (H) 38 - 42 mm Hg    POCT pO2, Arterial 193 (H) 85 - 95 mm Hg    POCT SO2, Arterial 100 94 - 100 %    POCT Oxy Hemoglobin, Arterial 96.5 94.0 - 98.0 %    POCT Hematocrit Calculated, Arterial 25.0 (L) 36.0 - 46.0 %    POCT Sodium, Arterial 138 136 - 145 mmol/L    POCT Potassium, Arterial 4.1 3.5 - 5.3 mmol/L    POCT Chloride, Arterial 102 98 - 107 mmol/L    POCT Ionized Calcium, Arterial 1.13 1.10 - 1.33 mmol/L    POCT Glucose, Arterial 139 (H) 74 - 99 mg/dL    POCT Lactate, Arterial 1.1 0.4 - 2.0 mmol/L    POCT Base Excess, Arterial 3.1 (H) -2.0 - 3.0 mmol/L    POCT HCO3 Calculated, Arterial 29.9 (H) 22.0 - 26.0 mmol/L    POCT Hemoglobin, Arterial 8.3 (L) 12.0 - 16.0 g/dL    POCT Anion Gap, Arterial 10 10 - 25 mmo/L    Patient Temperature 37.0 degrees Celsius    FiO2 21 %   Reticulocytes   Result Value Ref Range    Retic % 4.1 (H) 0.5 - 2.0 %    Retic Absolute 0.107 (H) 0.018 - 0.083 x10*6/uL    Reticulocyte Hemoglobin 29 28 - 38 pg    Immature Retic fraction 36.3 (H) <=16.0 %   Lactate Dehydrogenase   Result Value Ref Range    LDH 1,590 (H) 84 - 246 U/L   Hepatic function panel   Result Value Ref Range    Albumin 4.1 3.4 - 5.0 g/dL    Bilirubin, Total 2.2 (H) 0.0 - 1.2 mg/dL    Bilirubin, Direct 1.1 (H)  0.0 - 0.3 mg/dL    Alkaline Phosphatase 340 (H) 33 - 110 U/L    ALT 29 7 - 45 U/L    AST 60 (H) 9 - 39 U/L    Total Protein 6.8 6.4 - 8.2 g/dL   Renal Function Panel   Result Value Ref Range    Glucose 135 (H) 74 - 99 mg/dL    Sodium 141 136 - 145 mmol/L    Potassium 3.9 3.5 - 5.3 mmol/L    Chloride 99 98 - 107 mmol/L    Bicarbonate 28 21 - 32 mmol/L    Anion Gap 18 10 - 20 mmol/L    Urea Nitrogen 16 6 - 23 mg/dL    Creatinine 0.75 0.50 - 1.05 mg/dL    eGFR >90 >60 mL/min/1.73m*2    Calcium 8.5 (L) 8.6 - 10.6 mg/dL    Phosphorus 2.7 2.5 - 4.9 mg/dL    Albumin 4.2 3.4 - 5.0 g/dL   Magnesium   Result Value Ref Range    Magnesium 2.62 (H) 1.60 - 2.40 mg/dL   ECMO ARTERIAL FULL PANEL   Result Value Ref Range    POCT pH, Arterial ECMO 7.35 Reference range not established pH    POCT pCO2, Arterial ECMO 52 Reference range not established mm Hg    POCT pO2,  Arterial ECMO 330 Reference range not established mm Hg    POCT SO2, Arterial ECMO 100 Reference range not established %    POCT Oxy Hemoglobin, Arterial ECMO 96.6 Reference range not established %    POCT Hematocrit Calculated, Arterial ECMO 25.0 (L) 36.0 - 46.0 %    POCT Sodium, Arterial  ECMO 138 136 - 145 mmol/L    POCT Potassium, Arterial  ECMO 4.0 3.5 - 5.3 mmol/L    POCT Chloride, Arterial  ECMO 102 98 - 107 mmol/L    POCT Ionized Calcium, Arterial  ECMO 1.12 1.10 - 1.33 mmol/L    POCT Glucose, Arterial  ECMO 131 (H) 74 - 99 mg/dL    POCT Lactate Arterial  ECMO 1.0 0.4 - 2.0 mmol/L    POCT Base Excess, Arterial ECMO 2.6 Reference range not established mmol/L    POCT HCO3 Calculated, Arterial ECMO 28.7 Reference range not established mmol/L    POCT Hemoglobin, Arterial  ECMO 8.2 (L) 12.0 - 16.0 g/dL    POCT Anion Gap, Arterial  ECMO 11 Reference range not established mmo/L    Patient Temperature 37.0 degrees Celsius    FiO2 100 %   Blood Gas Arterial Full Panel   Result Value Ref Range    POCT pH, Arterial 7.41 7.38 - 7.42 pH    POCT pCO2, Arterial 45 (H) 38 - 42  mm Hg    POCT pO2, Arterial 139 (H) 85 - 95 mm Hg    POCT SO2, Arterial 100 94 - 100 %    POCT Oxy Hemoglobin, Arterial 96.3 94.0 - 98.0 %    POCT Hematocrit Calculated, Arterial 27.0 (L) 36.0 - 46.0 %    POCT Sodium, Arterial 136 136 - 145 mmol/L    POCT Potassium, Arterial 4.2 3.5 - 5.3 mmol/L    POCT Chloride, Arterial 103 98 - 107 mmol/L    POCT Ionized Calcium, Arterial 1.14 1.10 - 1.33 mmol/L    POCT Glucose, Arterial 146 (H) 74 - 99 mg/dL    POCT Lactate, Arterial 1.1 0.4 - 2.0 mmol/L    POCT Base Excess, Arterial 3.4 (H) -2.0 - 3.0 mmol/L    POCT HCO3 Calculated, Arterial 28.5 (H) 22.0 - 26.0 mmol/L    POCT Hemoglobin, Arterial 9.1 (L) 12.0 - 16.0 g/dL    POCT Anion Gap, Arterial 9 (L) 10 - 25 mmo/L    Patient Temperature 37.0 degrees Celsius    FiO2 21 %   Vancomycin   Result Value Ref Range    Vancomycin 13.3 5.0 - 20.0 ug/mL     *Note: Due to a large number of results and/or encounters for the requested time period, some results have not been displayed. A complete set of results can be found in Results Review.       Malnutrition Diagnosis Status: Declining  Malnutrition Diagnosis: Moderate malnutrition related to acute disease or injury  As Evidenced by: pt's reported intake likely meeting <75% of estimated needs for at least 7 days, previous 5% weight loss in 1 month, LOS 42.  I agree with the dietitian's malnutrition diagnosis.      Assessment/Plan   Ms. Yimi Bowles is a 34F with a PMHx sig for stage D HFrEF (PPCM) s/p ICD s/p CardioMEMs, hypothyroidism 2/2 thyroidectomy, obesity s/p gastric bypass (2017), and SLE who is s/p OHT (3/31/2022) with a post-OHT course complicated by RIJ/RUE DVTs, leukopenia, left groin seroma, and asymptomatic 2R ACR (11/2022) treated with steroids, s/p total hysterectomy (2023), Flu A (1/2024).     Originally presented to [facility] ED on 2/15/24 with complaints of N/V x 3 days and inability to keep medications down. Found to have acute systolic heart failure with primary graft  failure and cardiogenic shock. Treated for suspected acute cellular vs. acute antibody mediated rejection; however, multiple cardiac biopsies negative for pathology indicating rejection or other causes of graft failure. Course has been complicated by multiple MCS devices for refractory cardiogenic shock (right femoral IABP: 2/18/24 - 3/1/24, Left femoral VA ECMO: 2/19/24 - 2/29/24, Right axillary impella 5.5: 3/1/24 - remains in place, 2nd right femoral VA ECMO: 3/27/24 - remains in place), ARF requiring RRT, acute liver injury, intubation due to hypoxic respiratory failure, severe hemolysis 2/2 to impella malposition, mild CMV viremia, bilateral provoked IJ DVTs, multiple episodes of epistaxis & coagulopathies requiring ongoing blood product transfusions, & HCAP.       Transferred to CTICU on 3/27/24 following right femoral VA ECMO placement due to worsening cardiogenic shock and multi-organ failure despite Impella 5.5 support and multiple inotropes. Attempting for retransplantation of heart and new kidney transplantation.     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   #UTI w/ VRE?        Donor/Recipient Infectious history:  CMV: -/+ (low grade CMV viremia w/ levels < 35 on 3/1/24, repeat CMV levels remain negative since 4/7/24)  Toxo: -/-   Hep C: -/-     Rejection/Prophylaxis (transplants):  - Steroids: Continue 10mg PO prednisone daily (risk for adrenal insufficiency if stopped)  - Tacrolimus: stopped on 4/9/24 due to infection   - Myfortic acid: stopped on 4/9/24 due to infection  - Antifungals: nystatin 500,000units Q6  - Antivirals: valcyte 450mg Q48hrs   - Anti PCP & Toxoplasmosis: bactrim 200-40mg  daily      Last cardiac biopsy: 2/16/24 with ACR grade 1R and no AMR (extremely low C4d+ detected), 2/20/24 negative for ACR and AMR  Last HLA: 4/2/24 negative for class 1 or 2 antibodies   Last RHC: closing numbers on 3/16/24 /86(97) RA 20, PAP 40/33(37), C.O./C.I. 5.5/2.8, PVR 88, SVR 1115   Last LHC: 2/18/24 negative for CAV and CAD   Last TTE/BOB: 4/6/24 shows severe LVEF w/ global hypokinesis, severe RV dysfunction, mild-mod MR, mild TR and impella angled posteriorly   Osteopenia/osteoporosis prophylaxis: Vitamin D3 currently held. Continue calcium supplements  Peptic/gastric ulcer prophylaxis: Pantoprazole 40mg daily  CAV Prophylaxis: Holding Aspirin 81mg due to continued thrombocytopenia. Holding rosuvastatin due to transaminases.      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 Broad differential including SLE flair, giant cell vasculitis, CAV/CAD, viral cardiomyopathy, sarcoidosis, amyloidosis and allograft rejection amongst many others. 2xallograft biopsies on 2/16 & 2/20 remain negative for any significant pathology. Notably, both biopsies taken after rejection therapies implemented which may have reduced areas of graft damage.    - DSAs remain negative; however, patient may have non-HLA antibodies present. 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   - Negative CAV & CAD via LHC on 2/18/24   - Completed methylpred steroid pulse w/ 1g Q24 x 3 days (2/16-2/18) and prednisone taper   - Thymoglobulin doses: 2/18 & 2/19   - IVIG + PLEX Session: 2/18, 2/20, 3/7, 3/11 and 3/13    - Right femoral VA ECMO flowing 2.7L w/ FIO2 80% and sweep 4  - Right axillary impella for LV venting remains in place and set P2 w/ flows of 1.0  - Impella remains poorly positioned and appears to be in contact with mitral valve leaflets. LDH remains elevated but stable.   - LFTs remain elevated but stable  - Failed formal swallow eval on 4/10/24; ENT placed dobhoff w/ tubefeeds for nutritional support   -  Delirium and anxiety improving   - Sitting at edge of bed and standing daily w/ PT/OT & CTICU team assistance   - CT scan on 4/9/24 shows bilateral pulmonary infiltrates throughout lung fields, L>R, confirming HCAP. Unable to obtain adequate respiratory culture.   - UC from 4/9 also growing VRE    - Elevated fungitell beta-D serum level; normally indicative of invasive fungal infections vs. PCP infection. ID recommending rechecking level in 1xweek as multiple confounding factors may lead to false elevation   - Remains on broad spectrum antibiotics w/ IV vancomycin, micafungin and meropenem     Transplant Status:  - Was listed Status 1 for combined heart-kidney (listed in UNOS on 4/2/24); however, on 4/6 changed to UNOS Status 7 given development of new infection, coagulopathy, encephalopathy/delirium. Assessing upgrade in status daily.   - ABO status: O+  - CMV+/EBV+/Toxo-/Hep B-/Hep C-  - Prospective cross match with every donor offer  - Due to potential risk of hyperacute allograft rejection, induction plan will be 500mg solumedrol x 2 (followed by steroid taper) and thymoglobulin       Recommendations:  - Continue heparin purge through impella   - Would treat anemia conservatively, if hemodynamically stable with Hgb <7.0 then can hold off on further pRBC transfusions at this time.   - Recommend goal even to slightly negative today via CVVH  - Agree with deescalating IV antibiotics: discontinue vancomycin and micafungin     - Agree that risks of reintubation and bronch for BAL outweigh benefits. Would avoid at this time.  - Please continue conversations with ID regarding use of treatment dose bactrim for PJP treatment despite no culture data. It seems reasonable to treat for PJP given atypical presentation of pneumonia and immunosuppressed state.     - Please obtain PJP PCR as recommended by ID   - Please discuss with ID if treatment for urinary culture growing VRE is necessary       Continue excellent care per  CTICU team.      Patient was examined and discussed with [facility] Transplant Cardiology attending physician, Dr. Pretty Toussaint.    Heart Transplant Team:   [employer] Secure Chat  [id number] during day shift  [id number] during night shift     Kieth Jain, CNP

## 2024-04-12 NOTE — CARE PLAN
The patient's goals for the shift include Continue to run CVVH even to slightly negative, encourage progressive mobility    The clinical goals for the shift include fluid removal via CRRT, maintain hemodynamic stability, improve air exchange and reduce respiratory strain    Over the shift, the patient did not make progress toward the following goals. Barriers to progression include Pt has stable BP but CVP remains on low4-6. Recommendations to address these barriers include .Adjust fluid removal based on hemodynamics

## 2024-04-12 NOTE — PROGRESS NOTES
Physical Therapy    Physical Therapy Treatment    Patient Name: Charla Bowles  MRN: [id number]  Today's Date: 4/12/2024  Time Calculation  Start Time: 1408  Stop Time: 1443  Time Calculation (min): 35 min       Assessment/Plan   PT Assessment  PT Assessment Results: Decreased strength, Decreased endurance, Impaired balance, Decreased mobility  Rehab Prognosis: Good  Evaluation/Treatment Tolerance: Patient tolerated treatment well  Medical Staff Made Aware: Yes  End of Session Communication: Bedside nurse  End of Session Patient Position: Bed, 3 rail up, Alarm off, not on at start of session  PT Plan  Inpatient/Swing Bed or Outpatient: Inpatient  PT Plan  Treatment/Interventions: Bed mobility, Transfer training, Gait training, Balance training, Neuromuscular re-education, Strengthening, Endurance training, Therapeutic exercise, Therapeutic activity  PT Plan: Skilled PT  PT Frequency: 5 times per week  PT Discharge Recommendations: High intensity level of continued care  PT Recommended Transfer Status: Assist x2  PT - OK to Discharge: Yes      General Visit Information:   PT  Visit  PT Received On: 04/12/24  Response to Previous Treatment:  (Pt reported L knee pain at start of session)  General  Missed Visit: No  Family/Caregiver Present: No  Co-Treatment: OT  Co-Treatment Reason: Partial co tx with PT, on ECMO requiring skilled 2 person assist for safe mobility  Prior to Session Communication: Bedside nurse, Physician (PA)  Patient Position Received: Bed, 3 rail up, Alarm off, not on at start of session  Preferred Learning Style: auditory, verbal  General Comment: Pt awake, alert and willing to participate in PT session.  Assisted pt with completion of bed mobility, EOB sitting ~10 min and sit<>stand transfers x1.  Pt with stable vitals throughout session. R femoral ECMO flow 3.79/3.02, 85% FiO2, sweep 3, R axillary impella (P2) flow 1.2, CVVH through ECMO. ECMO and impella examined pre, during and post  mobility, stable and secure.    Subjective   Precautions:  Precautions  Medical Precautions: Cardiac precautions, Fall precautions  Precautions Comment: R axillary impella precautions- no pushing/pulling with R UE, No lifting R UE above shoulder height, no R UE humeral EXT past plane of body, R femoral ECMO precautions, MAP 70-90, protective precautions  Vital Signs:  Vital Signs  Heart Rate: (!) 115 (Post: 114)  Heart Rate Source: Monitor  Resp: (!) 35 (Post: 34)  BP: 99/87 (Post: 95/84)  MAP (mmHg): 92 (Post: 89)  BP Location: Right arm  BP Method: Arterial line  Patient Position: Lying    Objective   Pain:  Pain Assessment  Pain Assessment: 0-10  Pain Score:  (Pt reported pain in L knee - unrated)  Cognition:  Cognition  Overall Cognitive Status: Within Functional Limits  Arousal/Alertness: Appropriate responses to stimuli  Orientation Level: Oriented X4  Following Commands: Follows all commands and directions without difficulty  Activity Tolerance:  Activity Tolerance  Endurance: Tolerates 10 - 20 min exercise with multiple rests  Early Mobility/Exercise Safety Screen: Proceed with mobilization - No exclusion criteria met  Treatments:  Therapeutic Exercise  Therapeutic Exercise Performed: Yes  Therapeutic Exercise Activity 1: 1x5 LAQs at EOB with ~3-5 sec hold    Therapeutic Activity  Therapeutic Activity Performed: Yes  Therapeutic Activity 1: Increased time for skilled ICU line/tube management for safe functional mobility  Therapeutic Activity 2: Elevated HOB ~60 degrees prior to sitting EOB with stable vitals and ECMO flows.  Pt sat EOB ~10 min with CGA, good posture and stabiltiy  Therapeutic Activity 3: Pt stood EOB ~3 min with minAx2, B arm in arm.  Good standing posture however reported L knee pain.  Attempted to weight shift towards R side however pt reported being more comfortable in midline.    Bed Mobility  Bed Mobility: Yes  Bed Mobility 1  Bed Mobility 1: Supine to sitting, Sitting to supine  Level  of Assistance 1: Dependent (x2)  Bed Mobility Comments 1: HOB elevated, draw sheet utilized (RN managing ECMO lines)  Bed Mobility 2  Bed Mobility  2:  (Boost towards HOB)  Level of Assistance 2: Dependent (x2)  Bed Mobility Comments 2: HOB flat, draw sheet utilized    Ambulation/Gait Training  Ambulation/Gait Training Performed: No  Transfers  Transfer: Yes  Transfer 1  Transfer From 1: Sit to, Stand to  Transfer to 1: Sit, Stand  Technique 1: Sit to stand, Stand to sit  Transfer Device 1:  (B arm in arm)  Transfer Level of Assistance 1: Moderate assistance (x2)  Trials/Comments 1: Cues for hand placement, momentum utilized to complete transfer.  Draw sheet utilized, B knees blocked.    Stairs  Stairs: No    Outcome Measures:  [facility] Basic Mobility  Turning from your back to your side while in a flat bed without using bedrails: Total  Moving from lying on your back to sitting on the side of a flat bed without using bedrails: Total  Moving to and from bed to chair (including a wheelchair): Total  Standing up from a chair using your arms (e.g. wheelchair or bedside chair): A lot  To walk in hospital room: Total  Climbing 3-5 steps with railing: Total  Basic Mobility - Total Score: 7    FSS-ICU  Ambulation: Unable to attempt due to weakness  Rolling: Total assistance (performs 25% or requires another person)  Sitting: Supervision or set-up only  Transfer Sit-to-Stand: Maximal assistance (performs 25% - 49% of task)  Transfer Supine-to-Sit: Total assistance (performs 25% or requires another person)  Total Score: 9    Education Documentation  Body Mechanics, taught by Michelle Lee PT at 4/12/2024  3:28 PM.  Learner: Patient  Readiness: Acceptance  Method: Explanation, Demonstration  Response: Verbalizes Understanding, Demonstrated Understanding    Home Exercise Program, taught by Michelle Lee PT at 4/12/2024  3:28 PM.  Learner: Patient  Readiness: Acceptance  Method: Explanation, Demonstration  Response: Verbalizes  Understanding, Demonstrated Understanding    Mobility Training, taught by Michelle Lee, PT at 4/12/2024  3:28 PM.  Learner: Patient  Readiness: Acceptance  Method: Explanation, Demonstration  Response: Verbalizes Understanding, Demonstrated Understanding    Education Comments  No comments found.    EDUCATION:       Encounter Problems       Encounter Problems (Active)       Balance       Pt will demonstrate ability to complete sitting static/dynamic balance activities with unilateral UE support and no LOB for increase in safety up D/C.  (Progressing)       Start:  04/02/24    Expected End:  04/23/24               Mobility       Pt will demonstrated ability to complete 5 lateral side steps with modAx1, LRAD, proper form and no balance deficits for safe DC. (Progressing)       Start:  04/02/24    Expected End:  04/23/24            Pt will demonstrate ability to complete ther ex activities to improve functional strength for increase in independence upon DC.  (Progressing)       Start:  04/02/24    Expected End:  04/23/24               PT Transfers       Pt will demonstrated ability to complete bed mobility and sit<>stand transfers with mod assistance x2 and use of assistive device to safely DC. (Progressing)       Start:  04/02/24    Expected End:  04/23/24

## 2024-04-12 NOTE — CONSULTS
"Nutrition Follow Up Assessment:   Nutrition Assessment    Reason for Assessment: Provider consult order    4/08  dobhofff placed by ENT under fiberoptic visualization & TF initiated   4/10  TF goal rate achieved 0100 ; SLP rec'd NPO > ok for sipis/chips  4/11  Metamucil changed to Nutrisource fiber pkts by team    Order for PO K-phos four times per day since 4/03 which can have a laxative effect     Nutrition History:  Food and Nutrient History: Spoke with pt from door way -- reports she feels diarrhea is from the TF's and antibiotics. Discussed fiber to be added to slow transint time/bulk stools. If this fails we can try a different formula.       Anthropometrics:  Height: 154.9 cm (5' 0.98\")   Weight: 84 kg (185 lb 3 oz)   BMI (Calculated): 40.84  IBW/kg (Dietitian Calculated): 47.7 kg  Percent of IBW: 175 %  Adjusted Body Weight (kg): 57 kg    Weight History:   Date/Time Weight   04/06/24 0600 84 kg (185 lb 3 oz)   04/01/24 0600 90.3 kg (199 lb 1.2 oz)     Weight Change %:  Weight History / % Weight Change: no updated wt to re-assess    Nutrition Focused Physical Exam Findings:  Subcutaneous Fat Loss:   Orbital Fat Pads: Defer (per clinican discretion)  Muscle Wasting:     Edema:  Edema: +1 trace  Edema Location: generalized  Physical Findings:  Skin: Positive (left groin non-healing prior ECMO cannulation site > stage 1 PI to buttocks -- pt also now has FMS)    Nutrition Significant Labs:  CBC Trend:   Results from last 7 days   Lab Units 04/12/24  0014 04/11/24  0534 04/10/24  0609 04/09/24  1229   WBC AUTO x10*3/uL 13.1* 9.3 10.9 13.0*   RBC AUTO x10*6/uL 2.64* 2.51* 2.59* 2.67*   HEMOGLOBIN g/dL 7.8* 7.4* 7.5* 7.8*   HEMATOCRIT % 22.4* 22.2* 23.2* 23.9*   MCV fL 85 88 90 90   PLATELETS AUTO x10*3/uL 118* 102* 108* 122*    , A1C:  Lab Results   Component Value Date    HGBA1C 5.7 (H) 03/07/2024   , BG POCT trend:   Results from last 7 days   Lab Units 04/12/24  0902 04/11/24  1837 04/11/24  1223 04/11/24  0911 " 04/10/24  1148   POCT GLUCOSE mg/dL 220* 180* 239* 261* 239*    , Liver Function Trend:   Results from last 7 days   Lab Units 04/12/24  0017 04/11/24  0534 04/10/24  0609 04/09/24  0423   ALK PHOS U/L 340* 311* 286* 280*   AST U/L 60* 56* 54* 59*   ALT U/L 29 27 24 22   BILIRUBIN TOTAL mg/dL 2.2* 2.0* 2.1* 2.0*    , Renal Lab Trend:   Results from last 7 days   Lab Units 04/12/24  0017 04/11/24  0540 04/11/24  0534 04/10/24  0609   POTASSIUM mmol/L 3.9 3.9 3.8 3.8   PHOSPHORUS mg/dL 2.7 2.0* 2.3* 3.0   SODIUM mmol/L 141 141 139 138   MAGNESIUM mg/dL 2.62*  --   --   --    EGFR mL/min/1.73m*2 >90 >90 >90 >90   BUN mg/dL 16 14 15 14   CREATININE mg/dL 0.75 0.70 0.73 0.70          Nutrition Specific Medications:  Scheduled medications  acetaminophen, 650 mg, oral, q6h  calcium carbonate-vitamin D3, 1 tablet, oral, Daily  cyanocobalamin, 500 mcg, oral, Daily  darbepoetin floyd, 100 mcg, subcutaneous, q14 days  ferrous sulfate (325 mg ferrous sulfate), 65 mg of iron, oral, Daily with breakfast  folic acid, 1 mg, oral, Daily  heparin (porcine), 5,000 Units, subcutaneous, q8h  insulin lispro, 0-15 Units, subcutaneous, q4h  levothyroxine, 250 mcg, oral, Daily  lidocaine, 1 patch, transdermal, Daily  melatonin, 10 mg, oral, Daily  meropenem, 2 g, intravenous, q12h  micafungin, 100 mg, intravenous, q24h  multivitamin with minerals, 1 tablet, oral, Daily  nystatin, 5 mL, Swish & Swallow, q6h  oxygen, , inhalation, Continuous - Inhalation  pantoprazole, 40 mg, intravenous, Daily  predniSONE, 10 mg, oral, Daily  QUEtiapine, 50 mg, oral, Nightly  rosuvastatin, 10 mg, oral, Nightly  sod phos di, mono-K phos mono, 500 mg, oral, 4x daily  sulfamethoxazole-trimethoprim, 80 mg of trimethoprim, oral, Daily  valGANciclovir, 450 mg, oral, q48h  vancomycin, 1,000 mg, intravenous, q24h      Continuous medications  [Held by provider] dexmedeTOMIDine, 0.1-1.5 mcg/kg/hr, Last Rate: Stopped (04/11/24 0630)  heparin Impella Purge 25 units/mL in  500 mL D5W, 10 mL/hr, Last Rate: 10 mL/hr (04/12/24 0400)  lactated Ringer's, 5 mL/hr, Last Rate: 5 mL/hr (04/12/24 0400)  PrismaSol 4/2.5, 2,400 mL/hr, Last Rate: 2,400 mL/hr (04/09/24 1535)  [Held by provider] sodium bicarbonate infusion Impella Purge 25 mEq/1000 mL D5W, 10 mL/hr, Last Rate: Stopped (04/09/24 1200)      PRN medications  PRN medications: alteplase, bisacodyl, calcium gluconate, calcium gluconate, dextrose, dextrose **OR** glucagon, dextrose **OR** glucagon, hydrALAZINE, hydrOXYzine HCL, ipratropium-albuteroL, artificial tears, magnesium sulfate, magnesium sulfate, microfibrllar collagen, mupirocin, naloxone, ondansetron, oxyCODONE, sodium chloride, vancomycin      I/O:   x6 BM's recorded 4/09-10  600mls out via FMS 4/10-11  500mls out via FMS 4/11-12  RN notes stool overflow as well       Dietary Orders (From admission, onward)       Start     Ordered    04/12/24 0814  Oral nutritional supplements  Until discontinued        Question Answer Comment   Deliver with Breakfast TID   Deliver with Lunch    Deliver with Dinner    Select supplement: Nutrasource Fiber        04/12/24 0813    04/10/24 1045  Enteral feeding WITH diet order Diet type: Clear Liquid; Tube feeding formula: Glucerna 1.5; 50; 30; Water; Every 4 hours  Continuous        Comments: Clear liquid sips and chips only   Question Answer Comment   Diet type Clear Liquid    Tube feeding formula: Glucerna 1.5    Tube feeding continuous rate (mL/hr): 50    Tube feeding flush (mL): 30    Flush type: Water    Flush frequency: Every 4 hours        04/10/24 1047                     Estimated Needs:   Total Energy Estimated Needs (kCal):  (6174-0534)  Method for Estimating Needs: MSJ = 1479 kcals/d x1.2-1.3 stress factor ; also =31-33 kcals/kg of adjusted BW or =21-23 kcals/kg of actual BW (84 kgs)  Total Protein Estimated Needs (g):  (75-95)  Method for Estimating Needs: 1.5-2.0 gm/kg+ ideal BW  Total Fluid Estimated Needs (mL):  (per team)            Nutrition Diagnosis   Malnutrition Diagnosis  Patient has Malnutrition Diagnosis: Yes  Diagnosis Status: Ongoing  Malnutrition Diagnosis: Moderate malnutrition related to acute disease or injury  As Evidenced by: pt's reported intake likely meeting <75% of estimated needs for at least 7 days, previous 5% weight loss in 1 month, LOS 42.  Additional Assessment Information: Pt now on tube feeds + clear liquid diet    Nutrition Diagnosis  Patient has Nutrition Diagnosis: Yes  Diagnosis Status (1): Ongoing  Nutrition Diagnosis 1: Increased nutrient needs  Related to (1): altered metabolism for healing/recovery from critical illness  As Evidenced by (1): reinitiation of [facility] ECMO and CVVH, s/p Impella      Diagnosis Status (2): Resolved  Nutrition Diagnosis 2: Unintended weight gain  Related to (2): acute on chronic illness  As Evidenced by (2): up 41# since end of 3/29/23  Additional Assessment Information (2): wt now trending down from fluid removal    Diagnosis Status (3): Resolved  Nutrition Diagnosis 3: Inadequate oral intake  Related to (3): acute events (nose bleed, nausea/vomiting, constipation)  As Evidenced by (3): reduced ability to consume PO past few days  Additional Assessment Information (3): now on TF's       Nutrition Interventions/Recommendations      1) agree with nutrisource fiber pkts 3-4 times per day > Metamucil (increased likelihood of clogging small bore NG)     2)  consider IV electrolyte replacement as oral Kphos may have laxative effect    3) Consider antidiarrheals    4) If above fails, change to Vital 1.5 @ 55 mls/hr x 22 hour        Nutrition Interventions:   Interventions: Enteral intake  Goal: Glucerna 1.5 @ 50 x 22 hours = 1650 kcals, 91gm protein >> if needed, Vital 1.5 @ 50 x 22 hours = 1650 kcals, 75gm protein    Collaboration and Referral of Nutrition Care: Team meeting involving nutrition professional    Nutrition Education: discussed TF formula change if add fiber fails with  patient.          Nutrition Monitoring and Evaluation   Food/Nutrient Related History Monitoring  Monitoring and Evaluation Plan: Enteral and parenteral nutrition intake  Enteral and Parenteral Nutrition Intake: Enteral nutrition formula/solution  Criteria: pt will tolerate goal rate feeds with reduced stool output in the coming days         Biochemical Data, Medical Tests and Procedures  Monitoring and Evaluation Plan: Glucose/endocrine profile  Criteria: lytes WNL -- replete PRN  Criteria: BG control per team    Nutrition Focused Physical Findings  Criteria: minimize loss  Criteria: minimize loss       Time Spent/Follow-up Reminder:   Time Spent (min): 60 minutes  Last Date of Nutrition Visit: 04/12/24  Nutrition Follow-Up Needed?: Dietitian to reassess per policy  Follow up Comment: updated TF rec's

## 2024-04-12 NOTE — PROGRESS NOTES
"Vancomycin Dosing by Pharmacy- FOLLOW UP    Charla Bowles is a 34 y.o. year old female who Pharmacy has been consulted for vancomycin dosing for pneumonia. Based on the patient's indication and renal status this patient is being dosed based on a goal trough/random level of 15-20.     Renal function is currently being maintained by CVVH.    Current vancomycin dose: 1000 mg given every 24 hours    Most recent random level: 13.3 mcg/mL    Visit Vitals  BP (!) 103/92 (BP Location: Right arm, Patient Position: Lying) Comment: Post: 102/85   Pulse (!) 119   Temp 37.1 °C (98.8 °F) (Temporal)   Resp 25        Lab Results   Component Value Date    CREATININE 0.75 04/12/2024    CREATININE 0.70 04/11/2024    CREATININE 0.73 04/11/2024    CREATININE 0.70 04/10/2024        Patient weight is 84 kg    No results found for: \"CULTURE\"     I/O last 3 completed shifts:  In: 4692.8 (55.9 mL/kg) [P.O.:1180; I.V.:632.8 (7.5 mL/kg); NG/GT:2130; IV Piggyback:750]  Out: 8338 (99.3 mL/kg) [Other:7638; Stool:700]  Weight: 84 kg   [unfilled]    Lab Results   Component Value Date    PATIENTTEMP 37.0 04/12/2024    PATIENTTEMP 37.0 04/12/2024    PATIENTTEMP 37.0 04/12/2024    PATIENTTEMP 37.0 04/12/2024        Assessment/Plan    Below goal random/trough level. Orders placed for new vancomycin regimen of 1250 every 24 hours to begin at 0730 .  The next level will be obtained on 4/15 @ 0600. May be obtained sooner if clinically indicated.   Will continue to monitor renal function daily while on vancomycin and order serum creatinine at least every 48 hours if not already ordered.  Follow for continued vancomycin needs, clinical response, and signs/symptoms of toxicity.       Omar Layne, PharmD           "

## 2024-04-13 NOTE — PROGRESS NOTES
"        INPATIENT TRANSPLANT NEPHROLOGY PROGRESS NOTE          REASON FOR CONSULT: CRRT mgt    SUBJECTION:  [facility] Procedure note  Pt alert this morning. SOB improving.   Pt requesting a break from CRRT.       PHYCISCAL EXAMINATION:    Visit Vitals  BP 99/87 (BP Location: Right arm, Patient Position: Lying) Comment: Post: 95/84   Pulse (!) 117   Temp 37 °C (98.6 °F) (Temporal)   Resp 23   Ht 1.549 m (5' 0.98\")   Wt 84 kg (185 lb 3 oz)   SpO2 (!) 85%   BMI 35.01 kg/m²   OB Status Hysterectomy   Smoking Status Never   BSA 1.9 m²        04/11 0700 - 04/12 1859  In: 5782.1 [P.O.:1480; I.V.:582.1]  Out: 5790      Weight change:       Pulmonary:      Effort: Pulmonary effort is normal. No respiratory distress.      Breath sounds: Normal breath sounds.   Chest:      Comments: Right axillary impella site CDI   Abdominal:      General: Bowel sounds are normal.      Palpations: Abdomen is soft.      Tenderness: There is no abdominal tenderness.   Musculoskeletal:      -No LE edema     General: Skin is warm and dry.      Comments: Left groin ECMO cannulation site with drainage    Neurological:   A&OX3    MEDICATION LIST: REVIEWED    acetaminophen, 650 mg, q6h  calcium carbonate-vitamin D3, 1 tablet, Daily  cyanocobalamin, 500 mcg, Daily  darbepoetin floyd, 100 mcg, q14 days  ferrous sulfate (325 mg ferrous sulfate), 65 mg of iron, Daily with breakfast  folic acid, 1 mg, Daily  heparin (porcine), 5,000 Units, q8h  insulin lispro, 0-15 Units, q4h  levothyroxine, 250 mcg, Daily  lidocaine, 1 patch, Daily  melatonin, 10 mg, Daily  meropenem, 2 g, q12h  multivitamin with minerals, 1 tablet, Daily  nystatin, 5 mL, q6h  oxygen, , Continuous - Inhalation  pantoprazole, 40 mg, Daily  predniSONE, 10 mg, Daily  QUEtiapine, 50 mg, Nightly  [Held by provider] rosuvastatin, 10 mg, Nightly  sod phos di, mono-K phos mono, 500 mg, 4x daily  sulfamethoxazole-trimethoprim, 80 mg of trimethoprim, Daily  valGANciclovir, 450 mg, q48h  [START ON " 4/13/2024] vancomycin, 1,250 mg, q24h      [Held by provider] dexmedeTOMIDine, Last Rate: Stopped (04/11/24 0630)  heparin Impella Purge 25 units/mL in 500 mL D5W, Last Rate: 10 mL/hr (04/12/24 2000)  lactated Ringer's, Last Rate: 5 mL/hr (04/12/24 2000)  PrismaSol 4/2.5, Last Rate: 2,400 mL/hr (04/09/24 1535)      alteplase, 2 mg, PRN  bisacodyl, 10 mg, Daily PRN  calcium gluconate, 1 g, q6h PRN  calcium gluconate, 2 g, q6h PRN  dextrose, 25 g, q15 min PRN  dextrose, 25 g, q15 min PRN   Or  glucagon, 1 mg, q15 min PRN  dextrose, 25 g, q15 min PRN   Or  glucagon, 1 mg, q15 min PRN  hydrALAZINE, 5 mg, q4h PRN  hydrOXYzine HCL, 25 mg, q6h PRN  ipratropium-albuteroL, 3 mL, q6h PRN  artificial tears, 2 drop, PRN  magnesium sulfate, 2 g, q6h PRN  magnesium sulfate, 4 g, q6h PRN  microfibrllar collagen, , PRN  mupirocin, , BID PRN  naloxone, 0.2 mg, q5 min PRN  ondansetron, 4 mg, q4h PRN  oxyCODONE, 5 mg, q6h PRN  sodium chloride, 1 spray, 4x daily PRN  vancomycin, , Daily PRN        ALLERGY:  Allergies   Allergen Reactions    Dapagliflozin GI bleeding and Bleeding     UTI    Urinary tract infection    Empagliflozin Unknown    Myfortic [Mycophenolate Sodium] GI Upset    Topiramate Nausea Only and Nausea/vomiting    Latex Rash       LABS:  Results for orders placed or performed during the hospital encounter of 02/15/24 (from the past 24 hour(s))   CBC and Auto Differential   Result Value Ref Range    WBC 13.1 (H) 4.4 - 11.3 x10*3/uL    nRBC 10.0 (H) 0.0 - 0.0 /100 WBCs    RBC 2.64 (L) 4.00 - 5.20 x10*6/uL    Hemoglobin 7.8 (L) 12.0 - 16.0 g/dL    Hematocrit 22.4 (L) 36.0 - 46.0 %    MCV 85 80 - 100 fL    MCH 29.5 26.0 - 34.0 pg    MCHC 34.8 32.0 - 36.0 g/dL    RDW 19.8 (H) 11.5 - 14.5 %    Platelets 118 (L) 150 - 450 x10*3/uL    Neutrophils % 79.2 40.0 - 80.0 %    Immature Granulocytes %, Automated 6.3 (H) 0.0 - 0.9 %    Lymphocytes % 2.7 13.0 - 44.0 %    Monocytes % 11.1 2.0 - 10.0 %    Eosinophils % 0.5 0.0 - 6.0 %     Basophils % 0.2 0.0 - 2.0 %    Neutrophils Absolute 10.38 (H) 1.20 - 7.70 x10*3/uL    Immature Granulocytes Absolute, Automated 0.82 (H) 0.00 - 0.70 x10*3/uL    Lymphocytes Absolute 0.35 (L) 1.20 - 4.80 x10*3/uL    Monocytes Absolute 1.45 (H) 0.10 - 1.00 x10*3/uL    Eosinophils Absolute 0.07 0.00 - 0.70 x10*3/uL    Basophils Absolute 0.03 0.00 - 0.10 x10*3/uL   Heparin Assay, UFH   Result Value Ref Range    Heparin Unfractionated 0.2 See Comment Below for Therapeutic Ranges IU/mL   ECMO VENOUS FULL PANEL   Result Value Ref Range    POCT pH, Venous ECMO 7.30 Reference range not established pH    POCT pCO2, Venous ECMO 66 Reference range not established mm Hg    POCT pO2,  Venous  ECMO 32 Reference range not established mm Hg    POCT SO2, Venous ECMO 50 Reference range not established %    POCT Oxy Hemoglobin, Venous ECMO 48.4 Reference range not established %    POCT Hematocrit Calculated, Venous ECMO 24.0 (L) 36.0 - 46.0 %    POCT Sodium, Venous ECMO 139 136 - 145 mmol/L    POCT Potassium, Venous ECMO 4.0 3.5 - 5.3 mmol/L    POCT Chloride, Venous ECMO 102 98 - 107 mmol/L    POCT Ionized Calcium, Venous ECMO 1.12 1.10 - 1.33 mmol/L    POCT Glucose, Venous ECMO 140 (H) 74 - 99 mg/dL    POCT Lactate Venous ECMO 1.1 0.4 - 2.0 mmol/L    POCT Base Excess, Venous ECMO 5.1 Reference range not established mmol/L    POCT HCO3 Calculated, Venous ECMO 32.5 Reference range not established mmol/L    POCT Hemoglobin, Venous ECMO 8.1 (L) 12.0 - 16.0 g/dL    POCT Anion Gap, Venous ECMO 9 Reference range not established mmo/L    Patient Temperature 37.0 degrees Celsius    FiO2 85 %   Coagulation Screen   Result Value Ref Range    Protime 15.5 (H) 9.8 - 12.8 seconds    INR 1.4 (H) 0.9 - 1.1    aPTT 45 (H) 27 - 38 seconds   Calcium, ionized   Result Value Ref Range    POCT Calcium, Ionized 0.94 (L) 1.1 - 1.33 mmol/L   Morphology   Result Value Ref Range    RBC Morphology See Below     Polychromasia Mild     Hypochromia Mild     RBC  Fragments Few    ECMO ARTERIAL FULL PANEL   Result Value Ref Range    POCT pH, Arterial ECMO 7.34 Reference range not established pH    POCT pCO2, Arterial ECMO 51 Reference range not established mm Hg    POCT pO2,  Arterial ECMO 144 Reference range not established mm Hg    POCT SO2, Arterial ECMO 100 Reference range not established %    POCT Oxy Hemoglobin, Arterial ECMO 96.7 Reference range not established %    POCT Hematocrit Calculated, Arterial ECMO 40.0 36.0 - 46.0 %    POCT Sodium, Arterial  ECMO 135 (L) 136 - 145 mmol/L    POCT Potassium, Arterial  ECMO 3.8 3.5 - 5.3 mmol/L    POCT Chloride, Arterial  ECMO 102 98 - 107 mmol/L    POCT Ionized Calcium, Arterial  ECMO 1.07 (L) 1.10 - 1.33 mmol/L    POCT Glucose, Arterial  ECMO 132 (H) 74 - 99 mg/dL    POCT Lactate Arterial  ECMO 1.3 0.4 - 2.0 mmol/L    POCT Base Excess, Arterial ECMO 0.9 Reference range not established mmol/L    POCT HCO3 Calculated, Arterial ECMO 27.5 Reference range not established mmol/L    POCT Hemoglobin, Arterial  ECMO 13.4 12.0 - 16.0 g/dL    POCT Anion Gap, Arterial  ECMO 9 Reference range not established mmo/L    Patient Temperature 37.0 degrees Celsius    FiO2 85 %   Blood Gas Arterial Full Panel   Result Value Ref Range    POCT pH, Arterial 7.32 (L) 7.38 - 7.42 pH    POCT pCO2, Arterial 58 (H) 38 - 42 mm Hg    POCT pO2, Arterial 193 (H) 85 - 95 mm Hg    POCT SO2, Arterial 100 94 - 100 %    POCT Oxy Hemoglobin, Arterial 96.5 94.0 - 98.0 %    POCT Hematocrit Calculated, Arterial 25.0 (L) 36.0 - 46.0 %    POCT Sodium, Arterial 138 136 - 145 mmol/L    POCT Potassium, Arterial 4.1 3.5 - 5.3 mmol/L    POCT Chloride, Arterial 102 98 - 107 mmol/L    POCT Ionized Calcium, Arterial 1.13 1.10 - 1.33 mmol/L    POCT Glucose, Arterial 139 (H) 74 - 99 mg/dL    POCT Lactate, Arterial 1.1 0.4 - 2.0 mmol/L    POCT Base Excess, Arterial 3.1 (H) -2.0 - 3.0 mmol/L    POCT HCO3 Calculated, Arterial 29.9 (H) 22.0 - 26.0 mmol/L    POCT Hemoglobin, Arterial  8.3 (L) 12.0 - 16.0 g/dL    POCT Anion Gap, Arterial 10 10 - 25 mmo/L    Patient Temperature 37.0 degrees Celsius    FiO2 21 %   Haptoglobin   Result Value Ref Range    Haptoglobin <10 (L) 37 - 246 mg/dL   Reticulocytes   Result Value Ref Range    Retic % 4.1 (H) 0.5 - 2.0 %    Retic Absolute 0.107 (H) 0.018 - 0.083 x10*6/uL    Reticulocyte Hemoglobin 29 28 - 38 pg    Immature Retic fraction 36.3 (H) <=16.0 %   Lactate Dehydrogenase   Result Value Ref Range    LDH 1,590 (H) 84 - 246 U/L   Hepatic function panel   Result Value Ref Range    Albumin 4.1 3.4 - 5.0 g/dL    Bilirubin, Total 2.2 (H) 0.0 - 1.2 mg/dL    Bilirubin, Direct 1.1 (H) 0.0 - 0.3 mg/dL    Alkaline Phosphatase 340 (H) 33 - 110 U/L    ALT 29 7 - 45 U/L    AST 60 (H) 9 - 39 U/L    Total Protein 6.8 6.4 - 8.2 g/dL   Renal Function Panel   Result Value Ref Range    Glucose 135 (H) 74 - 99 mg/dL    Sodium 141 136 - 145 mmol/L    Potassium 3.9 3.5 - 5.3 mmol/L    Chloride 99 98 - 107 mmol/L    Bicarbonate 28 21 - 32 mmol/L    Anion Gap 18 10 - 20 mmol/L    Urea Nitrogen 16 6 - 23 mg/dL    Creatinine 0.75 0.50 - 1.05 mg/dL    eGFR >90 >60 mL/min/1.73m*2    Calcium 8.5 (L) 8.6 - 10.6 mg/dL    Phosphorus 2.7 2.5 - 4.9 mg/dL    Albumin 4.2 3.4 - 5.0 g/dL   Magnesium   Result Value Ref Range    Magnesium 2.62 (H) 1.60 - 2.40 mg/dL   ECMO ARTERIAL FULL PANEL   Result Value Ref Range    POCT pH, Arterial ECMO 7.35 Reference range not established pH    POCT pCO2, Arterial ECMO 52 Reference range not established mm Hg    POCT pO2,  Arterial ECMO 330 Reference range not established mm Hg    POCT SO2, Arterial ECMO 100 Reference range not established %    POCT Oxy Hemoglobin, Arterial ECMO 96.6 Reference range not established %    POCT Hematocrit Calculated, Arterial ECMO 25.0 (L) 36.0 - 46.0 %    POCT Sodium, Arterial  ECMO 138 136 - 145 mmol/L    POCT Potassium, Arterial  ECMO 4.0 3.5 - 5.3 mmol/L    POCT Chloride, Arterial  ECMO 102 98 - 107 mmol/L    POCT Ionized  Calcium, Arterial  ECMO 1.12 1.10 - 1.33 mmol/L    POCT Glucose, Arterial  ECMO 131 (H) 74 - 99 mg/dL    POCT Lactate Arterial  ECMO 1.0 0.4 - 2.0 mmol/L    POCT Base Excess, Arterial ECMO 2.6 Reference range not established mmol/L    POCT HCO3 Calculated, Arterial ECMO 28.7 Reference range not established mmol/L    POCT Hemoglobin, Arterial  ECMO 8.2 (L) 12.0 - 16.0 g/dL    POCT Anion Gap, Arterial  ECMO 11 Reference range not established mmo/L    Patient Temperature 37.0 degrees Celsius    FiO2 100 %   Blood Gas Arterial Full Panel   Result Value Ref Range    POCT pH, Arterial 7.41 7.38 - 7.42 pH    POCT pCO2, Arterial 45 (H) 38 - 42 mm Hg    POCT pO2, Arterial 139 (H) 85 - 95 mm Hg    POCT SO2, Arterial 100 94 - 100 %    POCT Oxy Hemoglobin, Arterial 96.3 94.0 - 98.0 %    POCT Hematocrit Calculated, Arterial 27.0 (L) 36.0 - 46.0 %    POCT Sodium, Arterial 136 136 - 145 mmol/L    POCT Potassium, Arterial 4.2 3.5 - 5.3 mmol/L    POCT Chloride, Arterial 103 98 - 107 mmol/L    POCT Ionized Calcium, Arterial 1.14 1.10 - 1.33 mmol/L    POCT Glucose, Arterial 146 (H) 74 - 99 mg/dL    POCT Lactate, Arterial 1.1 0.4 - 2.0 mmol/L    POCT Base Excess, Arterial 3.4 (H) -2.0 - 3.0 mmol/L    POCT HCO3 Calculated, Arterial 28.5 (H) 22.0 - 26.0 mmol/L    POCT Hemoglobin, Arterial 9.1 (L) 12.0 - 16.0 g/dL    POCT Anion Gap, Arterial 9 (L) 10 - 25 mmo/L    Patient Temperature 37.0 degrees Celsius    FiO2 21 %   Vancomycin   Result Value Ref Range    Vancomycin 13.3 5.0 - 20.0 ug/mL   POCT GLUCOSE   Result Value Ref Range    POCT Glucose 220 (H) 74 - 99 mg/dL   Renal Function Panel   Result Value Ref Range    Glucose 113 (H) 74 - 99 mg/dL    Sodium 140 136 - 145 mmol/L    Potassium 4.0 3.5 - 5.3 mmol/L    Chloride 100 98 - 107 mmol/L    Bicarbonate 28 21 - 32 mmol/L    Anion Gap 16 10 - 20 mmol/L    Urea Nitrogen 15 6 - 23 mg/dL    Creatinine 0.69 0.50 - 1.05 mg/dL    eGFR >90 >60 mL/min/1.73m*2    Calcium 8.3 (L) 8.6 - 10.6 mg/dL     Phosphorus 2.3 (L) 2.5 - 4.9 mg/dL    Albumin 4.0 3.4 - 5.0 g/dL   Calcium, ionized   Result Value Ref Range    POCT Calcium, Ionized 1.08 (L) 1.1 - 1.33 mmol/L   Magnesium   Result Value Ref Range    Magnesium 2.61 (H) 1.60 - 2.40 mg/dL   CBC   Result Value Ref Range    WBC 14.6 (H) 4.4 - 11.3 x10*3/uL    nRBC 10.1 (H) 0.0 - 0.0 /100 WBCs    RBC 2.47 (L) 4.00 - 5.20 x10*6/uL    Hemoglobin 7.4 (L) 12.0 - 16.0 g/dL    Hematocrit 23.0 (L) 36.0 - 46.0 %    MCV 93 80 - 100 fL    MCH 30.0 26.0 - 34.0 pg    MCHC 32.2 32.0 - 36.0 g/dL    RDW 20.7 (H) 11.5 - 14.5 %    Platelets 136 (L) 150 - 450 x10*3/uL   POCT GLUCOSE   Result Value Ref Range    POCT Glucose 106 (H) 74 - 99 mg/dL   POCT GLUCOSE   Result Value Ref Range    POCT Glucose 205 (H) 74 - 99 mg/dL   Blood Gas Arterial Full Panel   Result Value Ref Range    POCT pH, Arterial 7.35 (L) 7.38 - 7.42 pH    POCT pCO2, Arterial 48 (H) 38 - 42 mm Hg    POCT pO2, Arterial 159 (H) 85 - 95 mm Hg    POCT SO2, Arterial 100 94 - 100 %    POCT Oxy Hemoglobin, Arterial 96.8 94.0 - 98.0 %    POCT Hematocrit Calculated, Arterial 24.0 (L) 36.0 - 46.0 %    POCT Sodium, Arterial 136 136 - 145 mmol/L    POCT Potassium, Arterial 4.9 3.5 - 5.3 mmol/L    POCT Chloride, Arterial 102 98 - 107 mmol/L    POCT Ionized Calcium, Arterial 1.09 (L) 1.10 - 1.33 mmol/L    POCT Glucose, Arterial 167 (H) 74 - 99 mg/dL    POCT Lactate, Arterial 1.5 0.4 - 2.0 mmol/L    POCT Base Excess, Arterial 0.7 -2.0 - 3.0 mmol/L    POCT HCO3 Calculated, Arterial 26.5 (H) 22.0 - 26.0 mmol/L    POCT Hemoglobin, Arterial 7.9 (L) 12.0 - 16.0 g/dL    POCT Anion Gap, Arterial 12 10 - 25 mmo/L    Patient Temperature 37.0 degrees Celsius    FiO2 21 %     *Note: Due to a large number of results and/or encounters for the requested time period, some results have not been displayed. A complete set of results can be found in Results Review.        ASSESSMENT AND PLAN:    Ms. Yimi Bowles is a 34F with a PMHx sig for stage D  HFrEF (PPCM) s/p ICD s/p CardioMEMs, hypothyroidism 2/2 thyroidectomy, obesity s/p gastric bypass (2017), and SLE who is s/p OHT (3/31/2022) with a post-OHT course complicated by RIJ/RUE DVTs, leukopenia, left groin seroma, and asymptomatic 2R ACR (11/2022) treated with steroids, s/p total hysterectomy (2023), Flu A (1/2024).  Who initially presented on 2/15/2024 in the setting of acute systolic heart failure with the graft failure and cardiogenic shock.  Patient currently is on [facility] ECMO support and Impella with ongoing acute renal failure requiring CRRT.      CRRT Management Note:  #HIRAM-D, 2/2 ATN in setting of cardiogenic shock. Started on CRRT initially 2/19 and transitioned to iHD however unable to achieve optimal fluid management so transitioned back to CRRT   - Hemodynamically stable on fECMO and Impella.   - tolerating UFR per primary team. Pt requesting a break from CRRT. will discuss with primary team.   -monitor and replete lytes as indicated.   -Of note patient is currently status 7 for heart kidney transplant listed  on 4/2/2024.  Secondary to sepsis, pneumonia.

## 2024-04-13 NOTE — PROGRESS NOTES
ECMO:  Cannulation Date: (most recent) 3/27  ECMO Indication: Cardiogenic Shock  Current Goals of Care: Full Code    ECMO Cannula Configuration: Right femoral venous-right femoral arterial   ECMO circuit run from drainage cannula to pump to oxygenator to return cannula: Yes  Pre/post PaO2 adequate: Yes  LV Unloading/Pulse Pressure: Right axillary Impella 5.5 at P2, 1L flow   Distal Perfusion: R DPC in place, adequate pulses    ECMO Settings:     Most Recent Range Past 24hrs   Flow 3.04 Patient Flow (L/min)  Min: 3.01  Max: 3.21   Speed 3401 Circuit Flow (RPM)  Min: 3399  Max: 3401   Sweep 5 Sweep Gas Flow Rate (L/min)  Min: 4  Max: 5      Hemodynamic Management:[]    Invasive Hemodynamics:    Most Recent Range Past 24hrs   BP (Art) 98/88 Arterial Line BP 1  Min: 81/69  Max: 102/89   MAP(Art) 92 mmHg Arterial Line MAP 1 (mmHg)   Min: 73 mmHg  Max: 94 mmHg   RA/CVP   No data recorded   PA 41/34 No data recorded   PA(mean) 37 mmHg No data recorded   CO 5.5 L/min No data recorded   CI 2.8 L/min/m2 No data recorded   Mixed Venous 46.6 % SVO2 (%)  Min: 46.6 %  Max: 60 %   SVR  1115 (dyne*sec)/cm5 No data recorded     Ventilator Management:    Not intubated    Bleeding/Clot Issues:   Anticoagulation/Monitoring: Heparin through Impella purge and heparin assay  Presence of cannula bleeding: None at present  Presence of oxygenator clot: None at present    Management Issues:  Road Trips planned for today? None for today  Mobility Planned today? Mobility Plan/Candidacy: OOB, PT/OT today   Weaning Plan/date: Currently status 7 on transplant list for heart/kidney; Will re-assess on Monday for possible re-listing.  Family Updates/Concerns? Family updated and palliative care consult entered    Impella:      Most Recent Range Past 24hrs   Performance Level 2 P Level  Min: 2   Min taken time: 04/13/24 0900  Max: 2   Max taken time: 04/13/24 0900   Flow (L/min) 1.4 Flow (L/min)  Min: 1.1   Min taken time: 04/13/24 0400  Max: 1.4   Max  taken time: 04/13/24 0900   Motor Current 124/77 Motor Current  Min: 109/72   Min taken time: 04/13/24 0700  Max: 154/78   Max taken time: 04/12/24 1200   Placement Signal No  Placement OK could not be evaluated. This SmartLink does not work with rows of the type: Custom List   Purge (mmHg) 552 Purge Pressure (mmHg)  Min: 413   Min taken time: 04/13/24 0300  Max: 769   Max taken time: 04/13/24 0700   Purge rate (mL/hr) 10 Purge Rate (mL/hr)  Min: 10   Min taken time: 04/13/24 0900  Max: 10   Max taken time: 04/13/24 0900     Patient requires ECMO support. Drainage cannula site, cannula, pump, oxygenator, return cannula and return cannula site clinically inspected. RPM and sweep reviewed and adjusted appropriate to clinical context. Anticoagulation status and intensity discussed. Plan for weaning, next clinical steps discussed with team and family. Discussed with cardiothoracic surgeon, perfusion, palliative care and physical/occupational therapy.     I, as the attending critical care physician, have personally reviewed the recent patient history, physical exam, vital signs, significant labs, medications, imaging, and ECMO settings/flows of this critically ill patient. Using this information I will continue to actively manage this critically ill patient's extracorporeal membrane oxygenation (ECMO)/extracorporeal life support (ECLS) as documented in the assessment and plan above.      I spent 48 minutes in the professional and overall care of this patient.

## 2024-04-13 NOTE — PROGRESS NOTES
CTICU Progress Note  Charla Bowles/[id number]    Admit Date: 2/15/2024  Hospital Length of Stay: 58   ICU Length of Stay: 16d 16h   CT SURGEON: Dr. Witt    SUBJECTIVE:   Improved sleep  Net positive overnight    MEDICATIONS  Infusions:  [Held by provider] dexmedeTOMIDine, Last Rate: Stopped (04/11/24 0630)  heparin Impella Purge 25 units/mL in 500 mL D5W, Last Rate: 10 mL/hr (04/13/24 0402)  lactated Ringer's, Last Rate: 5 mL/hr (04/13/24 0400)  PrismaSol 4/2.5, Last Rate: 2,400 mL/hr (04/09/24 1535)      Scheduled:  acetaminophen, 650 mg, q6h  calcium carbonate-vitamin D3, 1 tablet, Daily  cyanocobalamin, 500 mcg, Daily  darbepoetin floyd, 100 mcg, q14 days  ferrous sulfate (325 mg ferrous sulfate), 65 mg of iron, Daily with breakfast  folic acid, 1 mg, Daily  heparin (porcine), 5,000 Units, q8h  insulin lispro, 0-15 Units, q4h  levothyroxine, 250 mcg, Daily  lidocaine, 1 patch, Daily  lidocaine, 1 patch, Daily  melatonin, 10 mg, Daily  meropenem, 2 g, q12h  multivitamin with minerals, 1 tablet, Daily  nystatin, 5 mL, q6h  oxygen, , Continuous - Inhalation  pantoprazole, 40 mg, Daily  predniSONE, 10 mg, Daily  QUEtiapine, 50 mg, Nightly  [Held by provider] rosuvastatin, 10 mg, Nightly  sod phos di, mono-K phos mono, 500 mg, 4x daily  sulfamethoxazole-trimethoprim, 80 mg of trimethoprim, Daily  valGANciclovir, 450 mg, q48h  vancomycin, 1,250 mg, q24h      PRN:  alteplase, 2 mg, PRN  bisacodyl, 10 mg, Daily PRN  calcium gluconate, 1 g, q6h PRN  calcium gluconate, 2 g, q6h PRN  dextrose, 25 g, q15 min PRN  dextrose, 25 g, q15 min PRN   Or  glucagon, 1 mg, q15 min PRN  dextrose, 25 g, q15 min PRN   Or  glucagon, 1 mg, q15 min PRN  hydrALAZINE, 5 mg, q4h PRN  hydrOXYzine HCL, 25 mg, q6h PRN  ipratropium-albuteroL, 3 mL, q6h PRN  artificial tears, 2 drop, PRN  magnesium sulfate, 2 g, q6h PRN  magnesium sulfate, 4 g, q6h PRN  microfibrllar collagen, , PRN  mupirocin, , BID PRN  naloxone, 0.2 mg, q5 min  "PRN  ondansetron, 4 mg, q4h PRN  oxyCODONE, 5 mg, q6h PRN  sodium chloride, 1 spray, 4x daily PRN  vancomycin, , Daily PRN        PHYSICAL EXAM:   Visit Vitals  BP 99/87 (BP Location: Right arm, Patient Position: Lying) Comment: Post: 95/84   Pulse (!) 117   Temp 36.5 °C (97.7 °F) (Temporal)   Resp 17   Ht 1.549 m (5' 0.98\")   Wt 84 kg (185 lb 3 oz)   SpO2 (!) 88%   BMI 35.01 kg/m²   OB Status Hysterectomy   Smoking Status Never   BSA 1.9 m²     Wt Readings from Last 5 Encounters:   04/06/24 84 kg (185 lb 3 oz)   12/07/23 92.1 kg (203 lb)   12/01/23 93 kg (205 lb)   11/29/23 92.9 kg (204 lb 12.8 oz)   11/09/23 91.3 kg (201 lb 3.2 oz)     INTAKE/OUTPUT:  I/O last 3 completed shifts:  In: 4455 (53 mL/kg) [P.O.:840; I.V.:595 (7.1 mL/kg); NG/GT:1920; IV Piggyback:1100]  Out: 4314 (51.4 mL/kg) [Other:4013; Stool:301]  Weight: 84 kg        LDA:  ECMO Cannulation Peripheral Right;Femoral artery;Femoral vein (Active)   Placement Date/Time: 03/27/24 1700   ECMO Type: Peripheral  Cannulation Site: Right;Femoral artery;Femoral vein  Line Securement: Line secured in 2 locations;Securement device;Sutures   Number of days: 10       Arterial Line 03/27/24 Right Radial (Active)   Placement Date/Time: 03/27/24 1545   Orientation: Right  Location: Radial   Number of days: 10       Ventricular Assist Device Impella 5.5 (Active)   Placement Date/Time: 03/01/24 1956   Placed by: Dr Viramontes  Hand Hygiene Completed: Yes  Site Location: Axilla right  VAD Type: Left ventricular assist device  VAD Brand: Impella 5.5   Number of days: 36       Hemodialysis Cath 04/06/24 Triple lumen Right Non-tunneled catheter Jugular (Active)   Placement Date/Time: 04/06/24 1500   Hand Hygiene Completed: Yes  Site Prep: Usual sterile procedure followed  Site Prep Agent has Completely Dried Before Insertion: Yes  All 5 Sterile Barriers Used (Gloves, Gown, Cap, Mask, Large Sterile Drape): Yes ...   Number of days: 0     Physical Exam:   - CONSTITUTION: " Critically ill on MCS  - NEUROLOGIC: A+O and CAM neg today, generally less confused. following in all 4 extremities.  Systemically weak   - CARDIOVASCULAR: ST, R fem VA ECMO, no bleeding at site. R axillary impella, no bleeding at site. No s/s infection at either site. +distal signals in bilateral lower extremities  - RESPIRATORY: Diminished bilateral lung sounds, symmetric chest expansion  - GI: Abdomen soft, non tender, non distended, +bowel sounds.  Diarrhea to FMS  - : Anuric, on CVVH via ECMO circuit  - EXTREMITIES: Warm and dry, no peripheral edema  - SKIN: Left femoral skin breakdown with dressing in place  - PSYCHIATRIC: Calm     Images: CXR c/f worsening pulmonary edema persisting    Invasive Hemodynamics:    Most Recent Range Past 24hrs   BP (Art) 102/91 Arterial Line BP 1  Min: 81/69  Max: 102/91   MAP(Art) 95 mmHg Arterial Line MAP 1 (mmHg)   Min: 73 mmHg  Max: 95 mmHg   RA/CVP   No data recorded   PA 41/34 No data recorded   PA(mean) 37 mmHg No data recorded   CO 5.5 L/min No data recorded   CI 2.8 L/min/m2 No data recorded   Mixed Venous 46.6 % SVO2 (%)  Min: 46.6 %  Max: 60 %   SVR  1115 (dyne*sec)/cm5 No data recorded     ECMO:     Most Recent Range Past 24hrs   Flow 3.04 Patient Flow (L/min)  Min: 3.01  Max: 3.21   Speed 3400 Circuit Flow (RPM)  Min: 3400  Max: 3401   Sweep 5 Sweep Gas Flow Rate (L/min)  Min: 4  Max: 5      Impella:      Most Recent Range Past 24hrs   Performance Level 2 P Level  Min: 2   Min taken time: 04/13/24 1100  Max: 2   Max taken time: 04/13/24 1100   Flow (L/min) 1.1 Flow (L/min)  Min: 1.1   Min taken time: 04/13/24 1100  Max: 1.4   Max taken time: 04/13/24 1000   Motor Current 122/80 Motor Current  Min: 109/72   Min taken time: 04/13/24 0700  Max: 154/78   Max taken time: 04/12/24 1200   Placement Signal No  Placement OK could not be evaluated. This SmartLink does not work with rows of the type: Custom List   Purge (mmHg) 472 Purge Pressure (mmHg)  Min: 413   Min taken  time: 04/13/24 0300  Max: 769   Max taken time: 04/13/24 0700   Purge rate (mL/hr) 10 Purge Rate (mL/hr)  Min: 10   Min taken time: 04/13/24 1100  Max: 10   Max taken time: 04/13/24 1100        Daily Risk Screen:  Dialysis/Hemapheresis    Assessment/Plan   34F with a PMHx sig for stage D HFrEF (PPCM) s/p ICD s/p CardioMEMs, hypothyroidism 2/2 thyroidectomy, obesity s/p gastric bypass (2017), and SLE who is s/p OHT (3/31/2022) with a post-OHT course complicated by RIJ/RUE DVTs, leukopenia, left groin seroma, and asymptomatic 2R ACR (11/2022) treated with steroids, s/p total hysterectomy (2023), Flu A (1/2024).     She presented to [facility] ED on 2/15/24 with complaints of N/V x 3 days and inability to keep medications down. Found to have acute systolic heart failure with primary graft failure and cardiogenic shock. Treated for suspected acute cellular vs. acute antibody mediated rejection; however, multiple cardiac biopsies negative for signs of rejection or other causes of graft failure. Her course has been complicated by multiple MCS devices for refractory cardiogenic shock (right femoral IABP: 2/18/24 - 3/1/24, Left femoral VA ECMO: 2/19/24 - 2/29/24, Right axillary impella 5.5: 3/1/24 - remains in place, 2nd right femoral VA ECMO: 3/27/24 - remains in place), ARF requiring RRT, acute liver injury, intubation due to volume overload in setting of pulmonary edema, severe hemolysis 2/2 to impella malposition, mild CMV viremia, bilateral provoked IJ DVTs, multiple episodes of epistaxis & coagulopathies requiring ongoing blood product transfusions.        Charla was transferred to CTICU on 3/27/24 following right femoral VA ECMO placement due to worsening cardiogenic shock and multi-organ failure despite Impella 5.5 support and multiple inotropes. She was listed Status 1 for heart/kidney on 4/2, however was deactivated (status 7) due to c/f sepsis.  She remains critically ill in the CTICU on MCS with ECMO and an Impella  as well as CVVH.       NEURO:  She vacillates between being neuro intact with intermittent delirium and anxiety though is much improved over last few days. Insomnia supported with multimodals and REST protocol.  CAM negative this AM.  Sitting at side of bed and standing with PT- max assist to get into position but then min assist to maintain.-->  - Serial neuro and pain assessments  - Continue tylenol scheduled  - Continue lidoderm patch for back pain  - PRN oxy 5mg Q6   - Continue melatonin 10 mg HS   - Continue Seroquel 50 mg HS (Qtc 614 4/13)    - Recheck ecg tonight before seroquel dose   - May use precedex at bedtime for sleep if needed  - PRN hydroxyzine  - PT/OT Consult; mobilize as able  - CAM ICU score qshift. Sleep/wake cycle hygiene  - REST protocol (CXR and labs after 6am)      ENT: Multiple episodes of epistaxis with interventions by ENT. Most recently in OR 3/27 with L nare packed. Packing removed 4/1. -->  - appreciate ENT recs  - PRN ocean spray and mupiricin for dry nasal passages  - PRN afrin      Cardiac: Patient has a history of heart transplant in March of 2022 with primary graft dysfunction treated for acute cellular and antibody rejection without improvement with negative biopsy with multiple MCS devices for refractory cardiogenic shock (right femoral IABP: 2/18/24 - 3/1/24; Left femoral VA ECMO: 2/19/24 - 2/29/24; Right axillary impella 5.5: 3/1/24 - ; 2nd right femoral VA ECMO: 3/27/24 - ). Elevated LDH and difficulty with flows despite multiple attempts at repositioning Impella previously.   Current ECMO settings RPMs 3400 flows ~3.5L with FiO2 85% and sweep 5; Impella 5.5 P2 with flows 1.2 LPM no alarms overnight. Remains sinus tachy and normotensive. -->  - Maintain goal MAP 70-90  - Continue full BiV support with ECMO  - Continue Impella at P2, for LV Venting  - Holding rosuvastatin with mild uptrending in LFT  - Trend daily LDH   - Hopefully increase to status 1A again this week  - Hold  home amlodipine     Vascular: 3/27 arterial duplex with proximal SFA occlusion with collateral flow. C/f LLE ischemia resolved. Feet warm with intact motor exam. -->  - appreciate vascular surgery following     PULM: She has been intubated multiple times throughout hospital course for procedures. Acute respiratory failure persisting due to acute pulmonary edema and CT chest 4/9 with severe multifocal PNA. Cxr improving with some cardiogenic pulmonary edema. Gas exchange augmented by VA ECMO, occasionally on NC for comfort.  -->  - CXR daily  - Adjust sweep for pH 7.3 - 7.5  - Maintain SPO2 > 92%     GI: History of gastric bypass and MMF colitis. Shock liver originally resolved; most recently elevated r/t likely congestive hepatopathy that is improving on ECMO. Abdominal pain improved with reduced myfortic dosing. Previous c/f GI bleed, likely blood from epistaxis; no current evidence of GI bleeding. H pylori negative, fecal calprotectin (elevated at 380), fecal lactoferrin (Positive 03/23/24). Poor nutritional intake.  Liver ultrasound 4/6 unconcerning. Dobhoff placed by ENT 4/8 under fiberoptic visualization.  Concern for aspiration in setting of PNA, SLP eval 4/10 was concerning for coughing with solids and they recommended sips & chips only for now.  Tolerating goal TF and continued liquid stool. -->  - Continue calcitriol 0.5mg daily, multivitamin, calcium carbonate 1,250mg BID  - Continue PPI daily  - Continue TF to goal. W/ fiber.   - HOLD PO diet per SLP rec's, sips and chips ok, speech to re-evaluate  - Hold Ensure Clear TID and magic cup  - Hold miralax BID & senna-colace BID     :  No history, baseline Cr 1.2-1.3. HIRAM originally on CVVH then with renal recovery but then again worsened and now dialysis dependent and failed diuretic challenges. Hypervolemia improved but continued pulmonary edema. Hypophosphatemia acceptably repleted.  -->  - RFP as clinically indicated, replete electrolytes per CTICU  protocol.   - Continue CVVH with goal removal -500 pending CVP goal 8-10   - Continue Sodium Phos 500 mg 4x daily  - Nephrology Following     ENDO: History of hypothyroidism and prediabetes. TSH elevated originally to malabsorption then with dosing adjusted by endo; TSH (3/17): 20.74, T4 1.11, T3: 1.4, improved 4/1; TSH 10.13, T4 0.70. 4/8: TSH 19.48, T3 0.8, T4 0.68. Steroid induced hyperglycemia acceptable glycemic control on SSI. -->  - Maintain BG <180 with hypoglycemia protocol  - Continue SSI #1 AC/HS   - Continue Synthroid 250mcg daily.   - Recheck TFT's 4/16 (ordered)   - Appreciate Endocrine Consult      HEME: History of iron deficiency anemia and remote DVT; again with acute DVT LIJ and SVT left cephalic vein and right cephalic vein. Acute blood loss anemia with repeated transfusions with significant hemolysis but no evidence of active bleeding; last received RBC 4/5. Stable thrombocytopenia persisting; last PF4 negative 3/30. No recent transfusion requirement. Coagulopathy r/t likely poor nutrition resolved with vit K x 3 doses (last 4/10). Heparin assay 0.3 on SQH and purge alone.  -->  - Continue ferrous sulfate daily  - Avoid unnecessarily transfusing, HGB > 7.0  - Hold systemic heparin with coagulopathy & recent anemia, re-eval daily. Continue SQH  - Continue heparin purge in the Impella.   - Trend coags daily  - SCDs for DVT prophylaxis  - Aranesp every 2 weeks  - Last type and screen: 4/11     Rejection/prophylaxis: S/p heart transplant 3/31/2022. Induction basiliximab. Donor HCV -, toxo -/-, CMV -/+. Mild ACR with +CD4 and negative HLAs however clinical presentation concern for acute cellular vs AMR rejection/stuttering rejection completed courses of thymo/PLEX/IVIG without clinical improvement.  With consideration to progressive graft failure and concern for infection limiting re-transplant at this time, we are holding tacro and myfortic (4/9 - ).  - Steroids: Continue PO prednisone 10 mg daily  -  Hold Tacrolimus: 4.0 mg AM/3.5 mg PM w/ daily levels drawn @ 0600  - Hold Tacrolimus goal troughs: 8-10  - Hold Myfortic acid: 360mg BID.  (Not starting MMF d/t hx of colitis)  - Antifungals: nystatin 500,000units Q6  - Antivirals: valcyte 450mg Q48hrs  - Anti PCP & Toxoplasmosis: continue SS Bactrim with c/f PNA     ID: C/f previous yeast infection. BC 3/27 NGTD final. MRSA screen negative 3/28. Episode of hypotension on 4/1, pan cultured and empiric Vanco/Zosyn started.  Patient was shivering 4/3, concern for risk of infection. Blood cultures sent 4/3, NGTD. Zosyn (4/1- 4/7) then broadened to Susan (4-7 - ) and natalie started (4/7 - ). CT chest 4/9 demonstrates severe multifocal PNA.  UA culture with enterococcus but difficult to define as infection given anuric state.  Beta-D glucan mildly elevated 195 thought to be 2/2 recent IVIG though could also be indicative of PJP.  CXR grossly unchanged with multifocal pna. Uptrending leukocytosis again.  -->  - Trend temp q4h  - dc natalie  - Continue Susan (4/7-17 ) 10 day course  - Continue Vanco (4/6-16 ). Discussing changing to linezolid for VRE with ID--> do not treat per ID. Recommending repeat UA but no urine to send.  - Unable to obtain better sputum sample and avoiding NG placement with epistaxis history. Discussing benefit/risk of intubating and bronching for sample vs empirically treating with treatment dose of bactrim. Will hold off since pt improving currently--> recommending not to empirically treat for PJP and consider bronch for better sample. Concern about intubating for bronch and potential for being able to extubated.  - Follow up cxs      Skin: Left groin wound from previous ECMO with wound consulted. Wound cx with NG 3/15.   - Preventative Mepilex dressings in place on sacrum and heels  - Change preventative Mepilex weekly or more frequently as indicated (when moist/soiled)   - Every shift skin assessment per nursing and weekly ICU skin rounds  - Moisture  barrier to be applied with christopher care  - Active skin problems addressed with nursing on daily rounds  - wound recs from note 3/15 ordered      Proph:  SCDs  SQH & heparin purge   PPI     G: Lines  RIJ Trialysis - 4/6  R radial maxwell 4/11  PIV x2    Restraints: Not indicated    Code status: Full Code    I personally spent 35 minutes of critical care time directly and personally managing the patient exclusive of separately billable procedures.     A,B,C,D,E,F,G: reviewed.    A&P reviewed on multidisciplinary critical care rounds      Dispo: CTICU care for now.    CTICU TEAM PHONE [id number]

## 2024-04-13 NOTE — PROGRESS NOTES
[facility]  ADVANCED HEART FAILURE AND TRANSPLANT CARDIOLOGY   Charla Bowles/[id number]    Admit Date: 2/15/2024  Hospital Length of Stay: 58   ICU Length of Stay: 16d 13h   Primary Service: CTICU      Charla Bowles is a 34 y.o. female on day 58 of admission presenting with Cardiogenic shock (Multi).    Subjective   No overnight events. Patient denies chest pain, dyspnea, abdominal pain, nausea/vomiting. No fever. She remains on VA-ECMO at 3 lpm, Impella 5.5 at P-2 at 1 liter flow. Fluid removal ongoing with CVVH. Total net input/output: +600 ml. Telemetry monitor showed sinus tachycardia at 110s.     Objective     Physical Exam  Constitutional:       Appearance: She is ill-appearing.   HENT:      Head: Normocephalic.      Comments: Not actively bleeding      Mouth/Throat:      Mouth: Mucous membranes are moist.      Pharynx: Oropharynx is clear. No oropharyngeal exudate or posterior oropharyngeal erythema.   Eyes:      Extraocular Movements: Extraocular movements intact.      Conjunctiva/sclera: Conjunctivae normal.      Pupils: Pupils are equal, round, and reactive to light.   Neck:      Vascular: No JVD.   Cardiovascular:      Rate and Rhythm: Tachycardia present.      Comments: Right axillary impella in place ~45cm at hub (no hematoma), Right femoral VA ECMO in place with reperfusion catheter (site appears clean and dry). Dopplerable radial and ulnar pulses bilaterally. Dopplerable PT pulses bilateral. Unable to doppler DP bilaterally.  Pulmonary:      Effort: No accessory muscle usage, respiratory distress or retractions.      Breath sounds: Normal breath sounds. No wheezing, rhonchi or rales.      Comments: RA. Tachypnea w/ RR 20-30. Small amount of blood tinged and purulent sputum.  Abdominal:      General: Abdomen is flat. Bowel sounds are normal. There is no distension.      Palpations: Abdomen is soft. There is no mass.      Hernia: No hernia is present.  "  Musculoskeletal:         General: No swelling (+1 BLE edema) or tenderness. Normal range of motion.      Cervical back: Full passive range of motion without pain.   Skin:     General: Skin is dry.      Capillary Refill: Capillary refill takes less than 2 seconds.      Coloration: Skin is not jaundiced.      Findings: Bruising and lesion (Left groin wound appears clean with granulation tissue. No signs of infection.) present. No erythema, laceration, rash or wound.   Neurological:      General: No focal deficit present.      Mental Status: She is alert and oriented to person, place, and time.   Psychiatric:         Mood and Affect: Mood is not anxious.         Behavior: Behavior normal.       Last Recorded Vitals  Blood pressure 99/87, pulse (!) 116, temperature 36.5 °C (97.7 °F), temperature source Temporal, resp. rate (!) 30, height 1.549 m (5' 0.98\"), weight 84 kg (185 lb 3 oz), SpO2 (!) 71%.  Intake/Output last 3 Shifts:  I/O last 3 completed shifts:  In: 4455 (53 mL/kg) [P.O.:840; I.V.:595 (7.1 mL/kg); NG/GT:1920; IV Piggyback:1100]  Out: 4314 (51.4 mL/kg) [Other:4013; Stool:301]  Weight: 84 kg     Relevant Results  Scheduled medications  acetaminophen, 650 mg, oral, q6h  calcium carbonate-vitamin D3, 1 tablet, oral, Daily  cyanocobalamin, 500 mcg, oral, Daily  darbepoetin floyd, 100 mcg, subcutaneous, q14 days  ferrous sulfate (325 mg ferrous sulfate), 65 mg of iron, oral, Daily with breakfast  folic acid, 1 mg, oral, Daily  heparin (porcine), 5,000 Units, subcutaneous, q8h  insulin lispro, 0-15 Units, subcutaneous, q4h  levothyroxine, 250 mcg, oral, Daily  lidocaine, 1 patch, transdermal, Daily  melatonin, 10 mg, oral, Daily  meropenem, 2 g, intravenous, q12h  multivitamin with minerals, 1 tablet, oral, Daily  nystatin, 5 mL, Swish & Swallow, q6h  oxygen, , inhalation, Continuous - Inhalation  pantoprazole, 40 mg, intravenous, Daily  predniSONE, 10 mg, oral, Daily  QUEtiapine, 50 mg, oral, Nightly  [Held by " provider] rosuvastatin, 10 mg, oral, Nightly  sod phos di, mono-K phos mono, 500 mg, oral, 4x daily  sulfamethoxazole-trimethoprim, 80 mg of trimethoprim, oral, Daily  valGANciclovir, 450 mg, oral, q48h  vancomycin, 1,250 mg, intravenous, q24h      Continuous medications  [Held by provider] dexmedeTOMIDine, 0.1-1.5 mcg/kg/hr, Last Rate: Stopped (04/11/24 0630)  heparin Impella Purge 25 units/mL in 500 mL D5W, 10 mL/hr, Last Rate: 10 mL/hr (04/13/24 0402)  lactated Ringer's, 5 mL/hr, Last Rate: 5 mL/hr (04/13/24 0400)  PrismaSol 4/2.5, 2,400 mL/hr, Last Rate: 2,400 mL/hr (04/09/24 1535)      PRN medications  PRN medications: alteplase, bisacodyl, calcium gluconate, calcium gluconate, dextrose, dextrose **OR** glucagon, dextrose **OR** glucagon, hydrALAZINE, hydrOXYzine HCL, ipratropium-albuteroL, artificial tears, magnesium sulfate, magnesium sulfate, microfibrllar collagen, mupirocin, naloxone, ondansetron, oxyCODONE, sodium chloride, vancomycin     Results for orders placed or performed during the hospital encounter of 02/15/24 (from the past 24 hour(s))   Renal Function Panel   Result Value Ref Range    Glucose 113 (H) 74 - 99 mg/dL    Sodium 140 136 - 145 mmol/L    Potassium 4.0 3.5 - 5.3 mmol/L    Chloride 100 98 - 107 mmol/L    Bicarbonate 28 21 - 32 mmol/L    Anion Gap 16 10 - 20 mmol/L    Urea Nitrogen 15 6 - 23 mg/dL    Creatinine 0.69 0.50 - 1.05 mg/dL    eGFR >90 >60 mL/min/1.73m*2    Calcium 8.3 (L) 8.6 - 10.6 mg/dL    Phosphorus 2.3 (L) 2.5 - 4.9 mg/dL    Albumin 4.0 3.4 - 5.0 g/dL   Calcium, ionized   Result Value Ref Range    POCT Calcium, Ionized 1.08 (L) 1.1 - 1.33 mmol/L   Magnesium   Result Value Ref Range    Magnesium 2.61 (H) 1.60 - 2.40 mg/dL   CBC   Result Value Ref Range    WBC 14.6 (H) 4.4 - 11.3 x10*3/uL    nRBC 10.1 (H) 0.0 - 0.0 /100 WBCs    RBC 2.47 (L) 4.00 - 5.20 x10*6/uL    Hemoglobin 7.4 (L) 12.0 - 16.0 g/dL    Hematocrit 23.0 (L) 36.0 - 46.0 %    MCV 93 80 - 100 fL    MCH 30.0 26.0 -  34.0 pg    MCHC 32.2 32.0 - 36.0 g/dL    RDW 20.7 (H) 11.5 - 14.5 %    Platelets 136 (L) 150 - 450 x10*3/uL   POCT GLUCOSE   Result Value Ref Range    POCT Glucose 106 (H) 74 - 99 mg/dL   POCT GLUCOSE   Result Value Ref Range    POCT Glucose 205 (H) 74 - 99 mg/dL   Blood Gas Arterial Full Panel   Result Value Ref Range    POCT pH, Arterial 7.35 (L) 7.38 - 7.42 pH    POCT pCO2, Arterial 48 (H) 38 - 42 mm Hg    POCT pO2, Arterial 159 (H) 85 - 95 mm Hg    POCT SO2, Arterial 100 94 - 100 %    POCT Oxy Hemoglobin, Arterial 96.8 94.0 - 98.0 %    POCT Hematocrit Calculated, Arterial 24.0 (L) 36.0 - 46.0 %    POCT Sodium, Arterial 136 136 - 145 mmol/L    POCT Potassium, Arterial 4.9 3.5 - 5.3 mmol/L    POCT Chloride, Arterial 102 98 - 107 mmol/L    POCT Ionized Calcium, Arterial 1.09 (L) 1.10 - 1.33 mmol/L    POCT Glucose, Arterial 167 (H) 74 - 99 mg/dL    POCT Lactate, Arterial 1.5 0.4 - 2.0 mmol/L    POCT Base Excess, Arterial 0.7 -2.0 - 3.0 mmol/L    POCT HCO3 Calculated, Arterial 26.5 (H) 22.0 - 26.0 mmol/L    POCT Hemoglobin, Arterial 7.9 (L) 12.0 - 16.0 g/dL    POCT Anion Gap, Arterial 12 10 - 25 mmo/L    Patient Temperature 37.0 degrees Celsius    FiO2 21 %   C. difficile, PCR    Specimen: Stool   Result Value Ref Range    C. difficile, PCR Not Detected Not Detected   Reticulocytes   Result Value Ref Range    Retic % 5.2 (H) 0.5 - 2.0 %    Retic Absolute 0.125 (H) 0.018 - 0.083 x10*6/uL    Reticulocyte Hemoglobin 34 28 - 38 pg    Immature Retic fraction 38.1 (H) <=16.0 %   CBC and Auto Differential   Result Value Ref Range    WBC 14.0 (H) 4.4 - 11.3 x10*3/uL    nRBC 8.7 (H) 0.0 - 0.0 /100 WBCs    RBC 2.42 (L) 4.00 - 5.20 x10*6/uL    Hemoglobin 7.1 (L) 12.0 - 16.0 g/dL    Hematocrit 21.4 (L) 36.0 - 46.0 %    MCV 88 80 - 100 fL    MCH 29.3 26.0 - 34.0 pg    MCHC 33.2 32.0 - 36.0 g/dL    RDW 20.1 (H) 11.5 - 14.5 %    Platelets 100 (L) 150 - 450 x10*3/uL    Neutrophils % 81.5 40.0 - 80.0 %    Immature Granulocytes %,  Automated 6.1 (H) 0.0 - 0.9 %    Lymphocytes % 2.2 13.0 - 44.0 %    Monocytes % 9.3 2.0 - 10.0 %    Eosinophils % 0.8 0.0 - 6.0 %    Basophils % 0.1 0.0 - 2.0 %    Neutrophils Absolute 11.36 (H) 1.20 - 7.70 x10*3/uL    Immature Granulocytes Absolute, Automated 0.85 (H) 0.00 - 0.70 x10*3/uL    Lymphocytes Absolute 0.31 (L) 1.20 - 4.80 x10*3/uL    Monocytes Absolute 1.30 (H) 0.10 - 1.00 x10*3/uL    Eosinophils Absolute 0.11 0.00 - 0.70 x10*3/uL    Basophils Absolute 0.02 0.00 - 0.10 x10*3/uL   Lactate Dehydrogenase   Result Value Ref Range    LDH 1,498 (H) 84 - 246 U/L   Hepatic function panel   Result Value Ref Range    Albumin 4.1 3.4 - 5.0 g/dL    Bilirubin, Total 2.2 (H) 0.0 - 1.2 mg/dL    Bilirubin, Direct 1.0 (H) 0.0 - 0.3 mg/dL    Alkaline Phosphatase 314 (H) 33 - 110 U/L    ALT 29 7 - 45 U/L    AST 68 (H) 9 - 39 U/L    Total Protein 6.5 6.4 - 8.2 g/dL   Calcium, ionized   Result Value Ref Range    POCT Calcium, Ionized 0.97 (L) 1.1 - 1.33 mmol/L   Magnesium   Result Value Ref Range    Magnesium 2.68 (H) 1.60 - 2.40 mg/dL   Coagulation Screen   Result Value Ref Range    Protime 14.4 (H) 9.8 - 12.8 seconds    INR 1.3 (H) 0.9 - 1.1    aPTT 48 (H) 27 - 38 seconds   ECMO VENOUS FULL PANEL   Result Value Ref Range    POCT pH, Venous ECMO 7.40 Reference range not established pH    POCT pCO2, Venous ECMO 49 Reference range not established mm Hg    POCT pO2,  Venous  ECMO 29 Reference range not established mm Hg    POCT SO2, Venous ECMO 48 Reference range not established %    POCT Oxy Hemoglobin, Venous ECMO 46.0 Reference range not established %    POCT Hematocrit Calculated, Venous ECMO 29.0 (L) 36.0 - 46.0 %    POCT Sodium, Venous ECMO      POCT Potassium, Venous ECMO 4.3 3.5 - 5.3 mmol/L    POCT Chloride, Venous ECMO 102 98 - 107 mmol/L    POCT Ionized Calcium, Venous ECMO 1.08 (L) 1.10 - 1.33 mmol/L    POCT Glucose, Venous ECMO 78 74 - 99 mg/dL    POCT Lactate Venous ECMO 0.9 0.4 - 2.0 mmol/L    POCT Base Excess,  Venous ECMO 4.8 Reference range not established mmol/L    POCT HCO3 Calculated, Venous ECMO 30.4 Reference range not established mmol/L    POCT Hemoglobin, Venous ECMO 9.7 (L) 12.0 - 16.0 g/dL    POCT Anion Gap, Venous ECMO      Patient Temperature 37.0 degrees Celsius    FiO2 21 %   Heparin Assay, UFH   Result Value Ref Range    Heparin Unfractionated 0.3 See Comment Below for Therapeutic Ranges IU/mL   Basic Metabolic Panel   Result Value Ref Range    Glucose 75 74 - 99 mg/dL    Sodium 139 136 - 145 mmol/L    Potassium 4.2 3.5 - 5.3 mmol/L    Chloride 99 98 - 107 mmol/L    Bicarbonate 27 21 - 32 mmol/L    Anion Gap 17 10 - 20 mmol/L    Urea Nitrogen 16 6 - 23 mg/dL    Creatinine 0.64 0.50 - 1.05 mg/dL    eGFR >90 >60 mL/min/1.73m*2    Calcium 8.4 (L) 8.6 - 10.6 mg/dL   Phosphorus   Result Value Ref Range    Phosphorus 3.7 2.5 - 4.9 mg/dL   Morphology   Result Value Ref Range    RBC Morphology See Below     Polychromasia Mild     Hypochromia Mild     RBC Fragments Many    Blood Gas Arterial Full Panel   Result Value Ref Range    POCT pH, Arterial 7.44 (H) 7.38 - 7.42 pH    POCT pCO2, Arterial 42 38 - 42 mm Hg    POCT pO2, Arterial 214 (H) 85 - 95 mm Hg    POCT SO2, Arterial 100 94 - 100 %    POCT Oxy Hemoglobin, Arterial 95.9 94.0 - 98.0 %    POCT Hematocrit Calculated, Arterial 36.0 36.0 - 46.0 %    POCT Sodium, Arterial 135 (L) 136 - 145 mmol/L    POCT Potassium, Arterial 4.1 3.5 - 5.3 mmol/L    POCT Chloride, Arterial 99 98 - 107 mmol/L    POCT Ionized Calcium, Arterial 1.04 (L) 1.10 - 1.33 mmol/L    POCT Glucose, Arterial 73 (L) 74 - 99 mg/dL    POCT Lactate, Arterial 0.9 0.4 - 2.0 mmol/L    POCT Base Excess, Arterial 3.9 (H) -2.0 - 3.0 mmol/L    POCT HCO3 Calculated, Arterial 28.5 (H) 22.0 - 26.0 mmol/L    POCT Hemoglobin, Arterial 11.9 (L) 12.0 - 16.0 g/dL    POCT Anion Gap, Arterial 12 10 - 25 mmo/L    Patient Temperature 37.0 degrees Celsius    FiO2 21 %   ECMO ARTERIAL FULL PANEL   Result Value Ref Range     POCT pH, Arterial ECMO 7.45 Reference range not established pH    POCT pCO2, Arterial ECMO 41 Reference range not established mm Hg    POCT pO2,  Arterial ECMO 365 Reference range not established mm Hg    POCT SO2, Arterial ECMO 100 Reference range not established %    POCT Oxy Hemoglobin, Arterial ECMO 95.9 Reference range not established %    POCT Hematocrit Calculated, Arterial ECMO 44.0 36.0 - 46.0 %    POCT Sodium, Arterial  ECMO 133 (L) 136 - 145 mmol/L    POCT Potassium, Arterial  ECMO 4.0 3.5 - 5.3 mmol/L    POCT Chloride, Arterial  ECMO 99 98 - 107 mmol/L    POCT Ionized Calcium, Arterial  ECMO 1.02 (L) 1.10 - 1.33 mmol/L    POCT Glucose, Arterial  ECMO 75 74 - 99 mg/dL    POCT Lactate Arterial  ECMO 1.0 0.4 - 2.0 mmol/L    POCT Base Excess, Arterial ECMO 4.1 Reference range not established mmol/L    POCT HCO3 Calculated, Arterial ECMO 28.5 Reference range not established mmol/L    POCT Hemoglobin, Arterial  ECMO 14.8 12.0 - 16.0 g/dL    POCT Anion Gap, Arterial  ECMO 10 Reference range not established mmo/L    Patient Temperature 37.0 degrees Celsius    FiO2 100 %   Blood Gas Arterial Full Panel   Result Value Ref Range    POCT pH, Arterial 7.40 7.38 - 7.42 pH    POCT pCO2, Arterial 42 38 - 42 mm Hg    POCT pO2, Arterial 220 (H) 85 - 95 mm Hg    POCT SO2, Arterial 100 94 - 100 %    POCT Oxy Hemoglobin, Arterial 96.0 94.0 - 98.0 %    POCT Hematocrit Calculated, Arterial 23.0 (L) 36.0 - 46.0 %    POCT Sodium, Arterial 134 (L) 136 - 145 mmol/L    POCT Potassium, Arterial 4.4 3.5 - 5.3 mmol/L    POCT Chloride, Arterial 101 98 - 107 mmol/L    POCT Ionized Calcium, Arterial 1.18 1.10 - 1.33 mmol/L    POCT Glucose, Arterial 182 (H) 74 - 99 mg/dL    POCT Lactate, Arterial 1.1 0.4 - 2.0 mmol/L    POCT Base Excess, Arterial 1.1 -2.0 - 3.0 mmol/L    POCT HCO3 Calculated, Arterial 26.0 22.0 - 26.0 mmol/L    POCT Hemoglobin, Arterial 7.6 (L) 12.0 - 16.0 g/dL    POCT Anion Gap, Arterial 11 10 - 25 mmo/L    Patient  Temperature 37.0 degrees Celsius    FiO2 21 %   POCT GLUCOSE   Result Value Ref Range    POCT Glucose 161 (H) 74 - 99 mg/dL     *Note: Due to a large number of results and/or encounters for the requested time period, some results have not been displayed. A complete set of results can be found in Results Review.        Malnutrition Diagnosis Status: Ongoing  Malnutrition Diagnosis: Moderate malnutrition related to acute disease or injury  As Evidenced by: pt's reported intake likely meeting <75% of estimated needs for at least 7 days, previous 5% weight loss in 1 month, LOS 42.  I agree with the dietitian's malnutrition diagnosis.      Assessment/Plan   Ms. Yimi Bowles is a 34F with a PMHx sig for stage D HFrEF (PPCM) s/p ICD s/p CardioMEMs, hypothyroidism 2/2 thyroidectomy, obesity s/p gastric bypass (2017), and SLE who is s/p OHT (3/31/2022) with a post-OHT course complicated by RIJ/RUE DVTs, leukopenia, left groin seroma, and asymptomatic 2R ACR (11/2022) treated with steroids, s/p total hysterectomy (2023), Flu A (1/2024).     Originally presented to [facility] ED on 2/15/24 with complaints of N/V x 3 days and inability to keep medications down. Found to have acute systolic heart failure with primary graft failure and cardiogenic shock. Treated for suspected acute cellular vs. acute antibody mediated rejection; however, multiple cardiac biopsies negative for pathology indicating rejection or other causes of graft failure. Course has been complicated by multiple MCS devices for refractory cardiogenic shock (right femoral IABP: 2/18/24 - 3/1/24, Left femoral VA ECMO: 2/19/24 - 2/29/24, Right axillary impella 5.5: 3/1/24 - remains in place, 2nd right femoral VA ECMO: 3/27/24 - remains in place), ARF requiring RRT, acute liver injury, intubation due to hypoxic respiratory failure, severe hemolysis 2/2 to impella malposition, mild CMV viremia, bilateral provoked IJ DVTs, multiple episodes of epistaxis & coagulopathies  requiring ongoing blood product transfusions, & HCAP.       Transferred to CTICU on 3/27/24 following right femoral VA ECMO placement due to worsening cardiogenic shock and multi-organ failure despite Impella 5.5 support and multiple inotropes. Attempting for retransplantation of heart and new kidney transplantation.     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   #UTI w/ VRE?        Donor/Recipient Infectious history:  CMV: -/+ (low grade CMV viremia w/ levels < 35 on 3/1/24, repeat CMV levels remain negative since 4/7/24)  Toxo: -/-   Hep C: -/-     Rejection/Prophylaxis (transplants):  - Steroids: Continue 10mg PO prednisone daily (risk for adrenal insufficiency if stopped)  - Tacrolimus: stopped on 4/9/24 due to infection   - Myfortic acid: stopped on 4/9/24 due to infection  - Antifungals: nystatin 500,000units Q6  - Antivirals: valcyte 450mg Q48hrs   - Anti PCP & Toxoplasmosis: bactrim 200-40mg daily      Last cardiac biopsy: 2/16/24 with ACR grade 1R and no AMR (extremely low C4d+ detected), 2/20/24 negative for ACR and AMR  Last HLA: 4/2/24 negative for class 1 or 2 antibodies   Last RHC: closing numbers on 3/16/24 /86(97) RA 20, PAP 40/33(37), C.O./C.I. 5.5/2.8, PVR 88, SVR 1115   Last LHC: 2/18/24 negative for CAV and CAD   Last TTE/BOB: 4/6/24 shows severe LVEF w/ global hypokinesis, severe RV dysfunction, mild-mod MR, mild TR and impella angled posteriorly   Osteopenia/osteoporosis prophylaxis: Vitamin D3 currently held. Continue calcium supplements  Peptic/gastric ulcer prophylaxis: Pantoprazole 40mg daily  CAV Prophylaxis: Holding Aspirin 81mg due to continued thrombocytopenia. Holding  rosuvastatin due to transaminases.      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 Broad differential including SLE flair, giant cell vasculitis, CAV/CAD, viral cardiomyopathy, sarcoidosis, amyloidosis and allograft rejection amongst many others. 2xallograft biopsies on 2/16 & 2/20 remain negative for any significant pathology. Notably, both biopsies taken after rejection therapies implemented which may have reduced areas of graft damage.    - DSAs remain negative; however, patient may have non-HLA antibodies present. 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   - Negative CAV & CAD via LHC on 2/18/24   - Completed methylpred steroid pulse w/ 1g Q24 x 3 days (2/16-2/18) and prednisone taper   - Thymoglobulin doses: 2/18 & 2/19   - IVIG + PLEX Session: 2/18, 2/20, 3/7, 3/11 and 3/13    - Right femoral VA ECMO flowing 2.7L w/ FIO2 80% and sweep 4  - Right axillary impella for LV venting remains in place and set P2 w/ flows of 1.0  - Impella remains poorly positioned and appears to be in contact with mitral valve leaflets. LDH remains elevated but stable.   - LFTs remain elevated but stable  - Failed formal swallow eval on 4/10/24; ENT placed dobhoff w/ tubefeeds for nutritional support   - Delirium and anxiety improving   - Sitting at edge of bed and standing daily w/ PT/OT & CTICU team assistance   - CT scan on 4/9/24 shows bilateral pulmonary infiltrates throughout lung fields, L>R, confirming HCAP. Unable to obtain adequate respiratory culture.   - UC from 4/9 also growing VRE    - Elevated fungitell beta-D serum level; normally indicative of invasive fungal infections vs. PCP infection. ID recommending rechecking level in 1xweek as multiple confounding factors may lead to false elevation   - Remains on broad spectrum antibiotics w/ IV vancomycin, micafungin and meropenem     Transplant Status:  - Was listed Status 1 for combined heart-kidney (listed in UNOS on 4/2/24); however, on 4/6 changed to  UNOS Status 7 given development of new infection, coagulopathy, encephalopathy/delirium. Assessing upgrade in status daily.   - ABO status: O+  - CMV+/EBV+/Toxo-/Hep B-/Hep C-  - Prospective cross match with every donor offer  - Due to potential risk of hyperacute allograft rejection, induction plan will be 500mg solumedrol x 2 (followed by steroid taper) and thymoglobulin       Recommendations:  - Continue heparin purge through impella   - Would treat anemia conservatively, if hemodynamically stable with Hgb <7.0 then can hold off on further pRBC transfusions at this time.   - Recommend goal even to slightly negative today via CVVH  - Please obtain PJP PCR.  - Hold off on bronchoscopy at this time.     Continue excellent care per CTICU team.      Patient was examined and discussed with Advanced Heart Failure and Transplant Cardiology attending physician, Dr. Pretty Toussaint.    Heart Transplant Team:   [employer] Secure Chat  [phone] during day shift  [id number] during night shift     Nasir Piper MD, PGY-4  Heart Failure fellow

## 2024-04-13 NOTE — PROGRESS NOTES
Charla Bowles is a 34 y.o. female on day 58 of admission presenting with Cardiogenic shock (Multi).    Subjective   Patient discussed in morning handover, patient examined and chart reviewed. Meds, labs and radiology reviewed during full interdisciplinary rounds this morning. Patient reviewed with Surgical and Cardiology HF today.    Patient discussed in morning handover, patient examined and chart reviewed. Meds, labs and radiology reviewed during full interdisciplinary rounds this morning. Discussed HF team this morning and reviewed with the HF surgeon this afternoon.     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      Patient presented to [facility] ED on 2/15/24 with complaints of N/V x 3 days and inability to keep medications down. POCUS revealed acute systolic heart failure w/ severe BIV dysfunction when compared to previous images in 11/2023. Also noted to have HIRAM requiring CVVH and transaminitis. Immunosuppression notably below therapeutic range. Admitted to HFICU and treated for suspected acute cellular vs. acute antibody mediated rejection (Endomyocardial biopsy on 2/16 revealed mild ACR with +CD4s and negative HLAs). Despite rejection therapy, inotropes and MCS via IABP (2/18/24), the patient's end organ function continued to worsen without improvement in cardiac function. Ultimately placed on left femoral VA ECMO w/ Dr. Marina on 2/19/2024 and transferred to CTICU.      Course in Hospital:     Prolonged course with multiple rounds of PLEX and IvIG and MCS devices including IABP, ECMO and Impella.  Patient has been re-listed for transplant, however recent concerns for sepsis have resulted in de-listing the patient for the  short-term.  Patient has been on antibiotics for 6-7 days and is improving clinically,      PMH:  stage D HFrEF (PPCM) s/p ICD s/p CardioMEMs,    and SLE who is s/p OHT (3/31/2022) with a post-OHT course complicated by RIJ/RUE DVTs, leukopenia, left groin seroma, and asymptomatic 2R ACR (11/2022) treated with steroids, s/p total hysterectomy (2023), Flu A (1/2024).   Remote DVT   Hypothyroidism 2/2 thyroidectomy  Obesity s/p gastric bypass (2017)  MMF colitis     Overnight: No new issues overnight. Stable on VA-ECMO and Impella devices    Objective     Physical Exam  Constitutional:       Appearance: She is not ill-appearing.   HENT:      Head:      Comments: Nasal packing out; no epistaxis for 2 days  Eyes:      Pupils: Pupils are equal, round, and reactive to light.   Cardiovascular:      Comments: See separate ECMO note    CVP:  [1 mmHg-17 mmHg] 12 mmHg  BP 99/87 (BP Location: Right arm, Patient Position: Lying) Comment: Post: 95/84  Pulse (!) 117   Temp 36.5 °C (97.7 °F) (Temporal)   Resp 17      Abdominal:      General: Bowel sounds are normal.      Palpations: Abdomen is soft.      Tenderness: There is no abdominal tenderness.   Genitourinary:     Comments: On CVVHDF  Neurological:      Mental Status: She is alert and oriented to person, place, and time.      Comments: CAM-ICU -  RASS 0  Slept well (5 hours) overnight)   Psychiatric:         Behavior: Behavior is cooperative.       Impella Interrogation:      Most Recent Range Past 24hrs   Performance Level 2 P Level  Min: 2   Min taken time: 04/13/24 1100  Max: 2   Max taken time: 04/13/24 1100   Flow (L/min) 1.1 Flow (L/min)  Min: 1.1   Min taken time: 04/13/24 1100  Max: 1.4   Max taken time: 04/13/24 1000   Motor Current 122/80 Motor Current  Min: 109/72   Min taken time: 04/13/24 0700  Max: 154/78   Max taken time: 04/12/24 1200   Placement Signal No  Placement OK could not be evaluated. This SmartLink does not work with rows of the type: Custom List  "  Purge (mmHg) 472 Purge Pressure (mmHg)  Min: 413   Min taken time: 04/13/24 0300  Max: 769   Max taken time: 04/13/24 0700   Purge rate (mL/hr) 10 Purge Rate (mL/hr)  Min: 10   Min taken time: 04/13/24 1100  Max: 10   Max taken time: 04/13/24 1100   LDH ~1400 (downtrending); no position or suction alarms    Last Recorded Vitals  Blood pressure 99/87, pulse (!) 116, temperature 36.5 °C (97.7 °F), temperature source Temporal, resp. rate (!) 30, height 1.549 m (5' 0.98\"), weight 84 kg (185 lb 3 oz), SpO2 (!) 71%.  Intake/Output last 3 Shifts:  I/O last 3 completed shifts:  In: 4455 (53 mL/kg) [P.O.:840; I.V.:595 (7.1 mL/kg); NG/GT:1920; IV Piggyback:1100]  Out: 4314 (51.4 mL/kg) [Other:4013; Stool:301]  Weight: 84 kg     Assessment/Plan   Principal Problem:    Cardiogenic shock (Multi)  Active Problems:    Heart transplant recipient (Multi)    ESRD (end stage renal disease) on dialysis (Multi)    Immunosuppression (Multi)    HIRAM (acute kidney injury) (CMS-HCC)    Acute passive congestion of liver    Hyponatremia    Iron deficiency anemia    Abdominal pain    Systolic congestive heart failure (Multi)    History of left ventricular assist device (Multi)    History of extracorporeal membrane oxygenation treatment    Moderate protein-calorie malnutrition (Multi)    Heart transplant as cause of abnormal reaction or later complication (Multi)    Cardiac transplant rejection (Multi)    Acute combined systolic (congestive) and diastolic (congestive) heart failure (Multi)    ICU Liberation Bundle:  A-Analgesia: Tylenol  B-SBT: not intubated  C-RASS Target: 0  D-CAM: negative  E-Mobilization Plan: OOBTC today  F - Family Last Update:    Daily Checklist:  HOB > 30 degrees: extubated  Feeding: p.o. clear liquid diet per SLP + enteral feeds  Thromboprophylaxis: Heparin via purge for Impella and SCDs  Ulcer Prophylaxis: PPI  Glucose Control: Target ): < 180 achieved; no hypoglycemia  Line(s) to removed:  Bowel Care: Bowel " regime held due to   De-escalation of Antibiotics: Meropenum (April 7th), vancomycin (April 6th) - will discuss with ID on duration.    Plan:  -500cc/24 hours  Check thyroid levels on April 16th  Rest protocol overnight  Follow-up with sputum culture  Will plan to re-connect with HF team on potential for re-listing on Monday.    I spent 49 minutes in the professional and overall care of this patient.    This critically ill patient continues to be at-risk for clinically significant deterioration / failure due to the above mentioned dysfunctional, unstable organ systems.  I have personally identified and managed all complex critical care issues to prevent aforementioned clinical deterioration.  Critical care time is spent at bedside and/or the immediate area and has included, but is not limited to, the review of diagnostic tests, labs, radiographs, serial assessments of hemodynamics, respiratory status, ventilatory management, and family updates.  Time spent in procedures and teaching are reported separately.    Eduardo Banks MD

## 2024-04-14 NOTE — PROGRESS NOTES
"Speech-Language Pathology                 Therapy Communication Note    Patient Name: Charla Bowles  MRN: [id number]  Today's Date: 4/14/2024     Discipline: Speech Language Pathology    Missed Visit Reason:      Missed Time: Attempt    Comment: attempted re-assessment today. Pt somewhat anxious with thought of trying new food consistencies. Stating \" a lot is going on at once\". Provided encouragement and reassurance however, she requests to attempt tomorrow instead.            "

## 2024-04-14 NOTE — PROGRESS NOTES
Charla Bowles is a 34 y.o. female on day 59 of admission presenting with Cardiogenic shock (Multi).    Subjective   Patient discussed in morning handover, patient examined and chart reviewed. Meds, labs and radiology reviewed during full interdisciplinary rounds this morning. Patient reviewed with Surgical and Cardiology HF today.    Patient discussed in morning handover, patient examined and chart reviewed. Meds, labs and radiology reviewed during full interdisciplinary rounds this morning. Discussed HF team this morning and reviewed with the HF surgeon this afternoon.     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      Patient presented to [facility] ED on 2/15/24 with complaints of N/V x 3 days and inability to keep medications down. POCUS revealed acute systolic heart failure w/ severe BIV dysfunction when compared to previous images in 11/2023. Also noted to have HIRAM requiring CVVH and transaminitis. Immunosuppression notably below therapeutic range. Admitted to HFICU and treated for suspected acute cellular vs. acute antibody mediated rejection (Endomyocardial biopsy on 2/16 revealed mild ACR with +CD4s and negative HLAs). Despite rejection therapy, inotropes and MCS via IABP (2/18/24), the patient's end organ function continued to worsen without improvement in cardiac function. Ultimately placed on left femoral VA ECMO w/ Dr. Marina on 2/19/2024 and transferred to CTICU.      Course in Hospital:     Prolonged course with multiple rounds of PLEX and IvIG and MCS devices including IABP, ECMO and Impella.  Patient has been re-listed for transplant, however recent concerns for sepsis have resulted in de-listing the patient for the  short-term.  Patient has been on antibiotics for 6-7 days and is improving clinically,      PMH:  stage D HFrEF (PPCM) s/p ICD s/p CardioMEMs,    and SLE who is s/p OHT (3/31/2022) with a post-OHT course complicated by RIJ/RUE DVTs, leukopenia, left groin seroma, and asymptomatic 2R ACR (11/2022) treated with steroids, s/p total hysterectomy (2023), Flu A (1/2024).   Remote DVT   Hypothyroidism 2/2 thyroidectomy  Obesity s/p gastric bypass (2017)  MMF colitis     Overnight: VA-ECMO were down overnight. Some alarms for Impella position ovenight    Objective     Physical Exam  Constitutional:       Appearance: She is not ill-appearing.   HENT:      Head:      Comments: Nasal packing out; no epistaxis for 2 days  Eyes:      Pupils: Pupils are equal, round, and reactive to light.   Cardiovascular:      Comments: See separate ECMO note    CVP:  [4 mmHg-21 mmHg] 4 mmHg    BP 99/87 (BP Location: Right arm, Patient Position: Lying) Comment: Post: 95/84  Pulse 107   Temp 36.6 °C (97.9 °F) (Temporal)   Resp (!) 31    Lines:  R internal jugular dialysis (4/6)  R rad. Art. Line.   Abdominal:      General: Bowel sounds are normal.      Palpations: Abdomen is soft.      Tenderness: There is no abdominal tenderness.      Comments: + emesis overnight   Genitourinary:     Comments: On CVVHDF  -100cc for past 24 hours  Neurological:      Mental Status: She is alert and oriented to person, place, and time.      Comments: CAM-ICU -  RASS 0  Slept well (5 hours) overnight)   Psychiatric:         Behavior: Behavior is cooperative.       Impella Interrogation:      Most Recent Range Past 24hrs   Performance Level 3 P Level  Min: 2   Min taken time: 04/14/24 0800  Max: 3   Max taken time: 04/14/24 0900   Flow (L/min) 1.1 Flow (L/min)  Min: 1   Min taken time: 04/14/24 0800  Max: 1.4   Max taken time: 04/13/24 1000   Motor Current 109/79 Motor Current  Min: 109/79   Min taken time: 04/14/24 0900  Max: 173/83   Max taken time: 04/14/24  "0200   Placement Signal No  Placement OK could not be evaluated. This SmartLink does not work with rows of the type: Custom List   Purge (mmHg) 535 Purge Pressure (mmHg)  Min: 416   Min taken time: 04/13/24 1700  Max: 582   Max taken time: 04/14/24 0600   Purge rate (mL/hr) 10 Purge Rate (mL/hr)  Min: 10   Min taken time: 04/14/24 0900  Max: 10   Max taken time: 04/14/24 0900   LDH ~1500 today; new position alarms overnight and this morning. Increased to P3    Last Recorded Vitals  Blood pressure 99/87, pulse 107, temperature 36.6 °C (97.9 °F), temperature source Temporal, resp. rate (!) 31, height 1.549 m (5' 0.98\"), weight 84 kg (185 lb 3 oz), SpO2 100%.  Intake/Output last 3 Shifts:  I/O last 3 completed shifts:  In: 3420 (40.7 mL/kg) [P.O.:800; I.V.:540 (6.4 mL/kg); NG/GT:1530; IV Piggyback:550]  Out: 4304 (51.2 mL/kg) [Other:4004; Stool:300]  Weight: 84 kg     Assessment/Plan   Principal Problem:    Cardiogenic shock (Multi)  Active Problems:    Heart transplant recipient (Multi)    ESRD (end stage renal disease) on dialysis (Multi)    Immunosuppression (Multi)    HIRAM (acute kidney injury) (CMS-HCC)    Acute passive congestion of liver    Hyponatremia    Iron deficiency anemia    Abdominal pain    Systolic congestive heart failure (Multi)    History of left ventricular assist device (Multi)    History of extracorporeal membrane oxygenation treatment    Moderate protein-calorie malnutrition (Multi)    Heart transplant as cause of abnormal reaction or later complication (Multi)    Cardiac transplant rejection (Multi)    Acute combined systolic (congestive) and diastolic (congestive) heart failure (Multi)    ICU Liberation Bundle:  A-Analgesia: Tylenol  B-SBT: not intubated  C-RASS Target: 0  D-CAM: negative  E-Mobilization Plan: ICU rehab with PT  F - Family Last Update: updated by the team    Daily Checklist:  HOB > 30 degrees: extubated  Feeding: p.o. clear liquid diet per SLP + enteral " feeds  Thromboprophylaxis: Heparin via purge for Impella and SCDs  Ulcer Prophylaxis: PPI  Glucose Control: Target ): < 180 achieved; no hypoglycemia  Line(s) to removed: None at present  Bowel Care: Bowel regime held due to loose stools  De-escalation of Antibiotics: Meropenum (April 7th), vancomycin (April 6th)    Plan:  Target -500cc/24 hours  Check thyroid levels on April 16th  Will get TTE to confirm Impella position  Rest protocol overnight  Follow-up with culture. WBC up to 18 this morning; ID is following    I spent 48 minutes in the professional and overall care of this patient.    This critically ill patient continues to be at-risk for clinically significant deterioration / failure due to the above mentioned dysfunctional, unstable organ systems. I have personally identified and managed all complex critical care issues to prevent aforementioned clinical deterioration.  Critical care time is spent at bedside and/or the immediate area and has included, but is not limited to, the review of diagnostic tests, labs, radiographs, serial assessments of hemodynamics, respiratory status, ventilatory management, and family updates.  Time spent in procedures and teaching are reported separately.    Eduardo Banks MD

## 2024-04-14 NOTE — PROGRESS NOTES
"        INPATIENT TRANSPLANT NEPHROLOGY PROGRESS NOTE          REASON FOR CONSULT: CRRT mgt    [facility] Procedure note    Net +ve.    PHYSICAL EXAMINATION:    Visit Vitals  BP 99/87 (BP Location: Right arm, Patient Position: Lying) Comment: Post: 95/84   Pulse 104   Temp 36.7 °C (98.1 °F) (Temporal)   Resp 24   Ht 1.549 m (5' 0.98\")   Wt 84 kg (185 lb 3 oz)   SpO2 98%   BMI 35.01 kg/m²   OB Status Hysterectomy   Smoking Status Never   BSA 1.9 m²        04/12 0700 - 04/13 1859  In: 4095 [P.O.:900; I.V.:375]  Out: 2721          Pulmonary:      Effort: Pulmonary effort is normal. No respiratory distress.      Breath sounds: Normal breath sounds.   Chest:      Comments: Right axillary impella site CDI   Abdominal:      General: Bowel sounds are normal.      Palpations: Abdomen is soft.      Tenderness: There is no abdominal tenderness.   Musculoskeletal:      -No LE edema     General: Skin is warm and dry.      Comments: Left groin ECMO cannulation site with drainage    Neurological:   A&OX3    MEDICATION LIST: REVIEWED    acetaminophen, 650 mg, q6h  calcium carbonate-vitamin D3, 1 tablet, Daily  cyanocobalamin, 500 mcg, Daily  darbepoetin floyd, 100 mcg, q14 days  ferrous sulfate (325 mg ferrous sulfate), 65 mg of iron, Daily with breakfast  folic acid, 1 mg, Daily  heparin (porcine), 5,000 Units, q8h  insulin lispro, 0-15 Units, q4h  levothyroxine, 250 mcg, Daily  lidocaine, 1 patch, Daily  lidocaine, 1 patch, Daily  melatonin, 10 mg, Daily  meropenem, 2 g, q12h  multivitamin with minerals, 1 tablet, Daily  nystatin, 5 mL, q6h  oxygen, , Continuous - Inhalation  pantoprazole, 40 mg, Daily  predniSONE, 10 mg, Daily  QUEtiapine, 50 mg, Nightly  [Held by provider] rosuvastatin, 10 mg, Nightly  [Held by provider] sod phos di, mono-K phos mono, 500 mg, 4x daily  sulfamethoxazole-trimethoprim, 80 mg of trimethoprim, Daily  valGANciclovir, 450 mg, q48h  vancomycin, 1,250 mg, q24h      [Held by provider] dexmedeTOMIDine, Last " Rate: Stopped (04/11/24 0630)  heparin Impella Purge 25 units/mL in 500 mL D5W, Last Rate: 10 mL/hr (04/13/24 2100)  lactated Ringer's, Last Rate: 5 mL/hr (04/13/24 2100)  PrismaSol 4/2.5, Last Rate: 2,400 mL/hr (04/13/24 1700)      alteplase, 2 mg, PRN  bisacodyl, 10 mg, Daily PRN  calcium gluconate, 1 g, q6h PRN  calcium gluconate, 2 g, q6h PRN  dextrose, 25 g, q15 min PRN  dextrose, 25 g, q15 min PRN   Or  glucagon, 1 mg, q15 min PRN  dextrose, 25 g, q15 min PRN   Or  glucagon, 1 mg, q15 min PRN  hydrALAZINE, 5 mg, q4h PRN  hydrOXYzine HCL, 25 mg, q6h PRN  ipratropium-albuteroL, 3 mL, q6h PRN  artificial tears, 2 drop, PRN  magnesium sulfate, 2 g, q6h PRN  magnesium sulfate, 4 g, q6h PRN  microfibrllar collagen, , PRN  mupirocin, , BID PRN  naloxone, 0.2 mg, q5 min PRN  ondansetron, 4 mg, q4h PRN  oxyCODONE, 5 mg, q6h PRN  sodium chloride, 1 spray, 4x daily PRN  vancomycin, , Daily PRN        ALLERGY:  Allergies   Allergen Reactions    Dapagliflozin GI bleeding and Bleeding     UTI    Urinary tract infection    Empagliflozin Unknown    Myfortic [Mycophenolate Sodium] GI Upset    Topiramate Nausea Only and Nausea/vomiting    Latex Rash       LABS:  Results for orders placed or performed during the hospital encounter of 02/15/24 (from the past 24 hour(s))   Reticulocytes   Result Value Ref Range    Retic % 5.2 (H) 0.5 - 2.0 %    Retic Absolute 0.125 (H) 0.018 - 0.083 x10*6/uL    Reticulocyte Hemoglobin 34 28 - 38 pg    Immature Retic fraction 38.1 (H) <=16.0 %   Haptoglobin   Result Value Ref Range    Haptoglobin <10 (L) 37 - 246 mg/dL   CBC and Auto Differential   Result Value Ref Range    WBC 14.0 (H) 4.4 - 11.3 x10*3/uL    nRBC 8.7 (H) 0.0 - 0.0 /100 WBCs    RBC 2.42 (L) 4.00 - 5.20 x10*6/uL    Hemoglobin 7.1 (L) 12.0 - 16.0 g/dL    Hematocrit 21.4 (L) 36.0 - 46.0 %    MCV 88 80 - 100 fL    MCH 29.3 26.0 - 34.0 pg    MCHC 33.2 32.0 - 36.0 g/dL    RDW 20.1 (H) 11.5 - 14.5 %    Platelets 100 (L) 150 - 450 x10*3/uL     Neutrophils % 81.5 40.0 - 80.0 %    Immature Granulocytes %, Automated 6.1 (H) 0.0 - 0.9 %    Lymphocytes % 2.2 13.0 - 44.0 %    Monocytes % 9.3 2.0 - 10.0 %    Eosinophils % 0.8 0.0 - 6.0 %    Basophils % 0.1 0.0 - 2.0 %    Neutrophils Absolute 11.36 (H) 1.20 - 7.70 x10*3/uL    Immature Granulocytes Absolute, Automated 0.85 (H) 0.00 - 0.70 x10*3/uL    Lymphocytes Absolute 0.31 (L) 1.20 - 4.80 x10*3/uL    Monocytes Absolute 1.30 (H) 0.10 - 1.00 x10*3/uL    Eosinophils Absolute 0.11 0.00 - 0.70 x10*3/uL    Basophils Absolute 0.02 0.00 - 0.10 x10*3/uL   Lactate Dehydrogenase   Result Value Ref Range    LDH 1,498 (H) 84 - 246 U/L   Hepatic function panel   Result Value Ref Range    Albumin 4.1 3.4 - 5.0 g/dL    Bilirubin, Total 2.2 (H) 0.0 - 1.2 mg/dL    Bilirubin, Direct 1.0 (H) 0.0 - 0.3 mg/dL    Alkaline Phosphatase 314 (H) 33 - 110 U/L    ALT 29 7 - 45 U/L    AST 68 (H) 9 - 39 U/L    Total Protein 6.5 6.4 - 8.2 g/dL   Calcium, ionized   Result Value Ref Range    POCT Calcium, Ionized 0.97 (L) 1.1 - 1.33 mmol/L   Magnesium   Result Value Ref Range    Magnesium 2.68 (H) 1.60 - 2.40 mg/dL   Coagulation Screen   Result Value Ref Range    Protime 14.4 (H) 9.8 - 12.8 seconds    INR 1.3 (H) 0.9 - 1.1    aPTT 48 (H) 27 - 38 seconds   ECMO VENOUS FULL PANEL   Result Value Ref Range    POCT pH, Venous ECMO 7.40 Reference range not established pH    POCT pCO2, Venous ECMO 49 Reference range not established mm Hg    POCT pO2,  Venous  ECMO 29 Reference range not established mm Hg    POCT SO2, Venous ECMO 48 Reference range not established %    POCT Oxy Hemoglobin, Venous ECMO 46.0 Reference range not established %    POCT Hematocrit Calculated, Venous ECMO 29.0 (L) 36.0 - 46.0 %    POCT Sodium, Venous ECMO      POCT Potassium, Venous ECMO 4.3 3.5 - 5.3 mmol/L    POCT Chloride, Venous ECMO 102 98 - 107 mmol/L    POCT Ionized Calcium, Venous ECMO 1.08 (L) 1.10 - 1.33 mmol/L    POCT Glucose, Venous ECMO 78 74 - 99 mg/dL     POCT Lactate Venous ECMO 0.9 0.4 - 2.0 mmol/L    POCT Base Excess, Venous ECMO 4.8 Reference range not established mmol/L    POCT HCO3 Calculated, Venous ECMO 30.4 Reference range not established mmol/L    POCT Hemoglobin, Venous ECMO 9.7 (L) 12.0 - 16.0 g/dL    POCT Anion Gap, Venous ECMO      Patient Temperature 37.0 degrees Celsius    FiO2 21 %   Heparin Assay, UFH   Result Value Ref Range    Heparin Unfractionated 0.3 See Comment Below for Therapeutic Ranges IU/mL   Basic Metabolic Panel   Result Value Ref Range    Glucose 75 74 - 99 mg/dL    Sodium 139 136 - 145 mmol/L    Potassium 4.2 3.5 - 5.3 mmol/L    Chloride 99 98 - 107 mmol/L    Bicarbonate 27 21 - 32 mmol/L    Anion Gap 17 10 - 20 mmol/L    Urea Nitrogen 16 6 - 23 mg/dL    Creatinine 0.64 0.50 - 1.05 mg/dL    eGFR >90 >60 mL/min/1.73m*2    Calcium 8.4 (L) 8.6 - 10.6 mg/dL   Phosphorus   Result Value Ref Range    Phosphorus 3.7 2.5 - 4.9 mg/dL   Morphology   Result Value Ref Range    RBC Morphology See Below     Polychromasia Mild     Hypochromia Mild     RBC Fragments Many    Blood Gas Arterial Full Panel   Result Value Ref Range    POCT pH, Arterial 7.44 (H) 7.38 - 7.42 pH    POCT pCO2, Arterial 42 38 - 42 mm Hg    POCT pO2, Arterial 214 (H) 85 - 95 mm Hg    POCT SO2, Arterial 100 94 - 100 %    POCT Oxy Hemoglobin, Arterial 95.9 94.0 - 98.0 %    POCT Hematocrit Calculated, Arterial 36.0 36.0 - 46.0 %    POCT Sodium, Arterial 135 (L) 136 - 145 mmol/L    POCT Potassium, Arterial 4.1 3.5 - 5.3 mmol/L    POCT Chloride, Arterial 99 98 - 107 mmol/L    POCT Ionized Calcium, Arterial 1.04 (L) 1.10 - 1.33 mmol/L    POCT Glucose, Arterial 73 (L) 74 - 99 mg/dL    POCT Lactate, Arterial 0.9 0.4 - 2.0 mmol/L    POCT Base Excess, Arterial 3.9 (H) -2.0 - 3.0 mmol/L    POCT HCO3 Calculated, Arterial 28.5 (H) 22.0 - 26.0 mmol/L    POCT Hemoglobin, Arterial 11.9 (L) 12.0 - 16.0 g/dL    POCT Anion Gap, Arterial 12 10 - 25 mmo/L    Patient Temperature 37.0 degrees Celsius     FiO2 21 %   ECMO ARTERIAL FULL PANEL   Result Value Ref Range    POCT pH, Arterial ECMO 7.45 Reference range not established pH    POCT pCO2, Arterial ECMO 41 Reference range not established mm Hg    POCT pO2,  Arterial ECMO 365 Reference range not established mm Hg    POCT SO2, Arterial ECMO 100 Reference range not established %    POCT Oxy Hemoglobin, Arterial ECMO 95.9 Reference range not established %    POCT Hematocrit Calculated, Arterial ECMO 44.0 36.0 - 46.0 %    POCT Sodium, Arterial  ECMO 133 (L) 136 - 145 mmol/L    POCT Potassium, Arterial  ECMO 4.0 3.5 - 5.3 mmol/L    POCT Chloride, Arterial  ECMO 99 98 - 107 mmol/L    POCT Ionized Calcium, Arterial  ECMO 1.02 (L) 1.10 - 1.33 mmol/L    POCT Glucose, Arterial  ECMO 75 74 - 99 mg/dL    POCT Lactate Arterial  ECMO 1.0 0.4 - 2.0 mmol/L    POCT Base Excess, Arterial ECMO 4.1 Reference range not established mmol/L    POCT HCO3 Calculated, Arterial ECMO 28.5 Reference range not established mmol/L    POCT Hemoglobin, Arterial  ECMO 14.8 12.0 - 16.0 g/dL    POCT Anion Gap, Arterial  ECMO 10 Reference range not established mmo/L    Patient Temperature 37.0 degrees Celsius    FiO2 100 %   Blood Gas Arterial Full Panel   Result Value Ref Range    POCT pH, Arterial 7.40 7.38 - 7.42 pH    POCT pCO2, Arterial 42 38 - 42 mm Hg    POCT pO2, Arterial 220 (H) 85 - 95 mm Hg    POCT SO2, Arterial 100 94 - 100 %    POCT Oxy Hemoglobin, Arterial 96.0 94.0 - 98.0 %    POCT Hematocrit Calculated, Arterial 23.0 (L) 36.0 - 46.0 %    POCT Sodium, Arterial 134 (L) 136 - 145 mmol/L    POCT Potassium, Arterial 4.4 3.5 - 5.3 mmol/L    POCT Chloride, Arterial 101 98 - 107 mmol/L    POCT Ionized Calcium, Arterial 1.18 1.10 - 1.33 mmol/L    POCT Glucose, Arterial 182 (H) 74 - 99 mg/dL    POCT Lactate, Arterial 1.1 0.4 - 2.0 mmol/L    POCT Base Excess, Arterial 1.1 -2.0 - 3.0 mmol/L    POCT HCO3 Calculated, Arterial 26.0 22.0 - 26.0 mmol/L    POCT Hemoglobin, Arterial 7.6 (L) 12.0 -  16.0 g/dL    POCT Anion Gap, Arterial 11 10 - 25 mmo/L    Patient Temperature 37.0 degrees Celsius    FiO2 21 %   POCT GLUCOSE   Result Value Ref Range    POCT Glucose 161 (H) 74 - 99 mg/dL   POCT GLUCOSE   Result Value Ref Range    POCT Glucose 140 (H) 74 - 99 mg/dL   Blood Gas Arterial Full Panel   Result Value Ref Range    POCT pH, Arterial 7.34 (L) 7.38 - 7.42 pH    POCT pCO2, Arterial 48 (H) 38 - 42 mm Hg    POCT pO2, Arterial 159 (H) 85 - 95 mm Hg    POCT SO2, Arterial 100 94 - 100 %    POCT Oxy Hemoglobin, Arterial 96.0 94.0 - 98.0 %    POCT Hematocrit Calculated, Arterial 25.0 (L) 36.0 - 46.0 %    POCT Sodium, Arterial 133 (L) 136 - 145 mmol/L    POCT Potassium, Arterial 4.9 3.5 - 5.3 mmol/L    POCT Chloride, Arterial 100 98 - 107 mmol/L    POCT Ionized Calcium, Arterial 1.06 (L) 1.10 - 1.33 mmol/L    POCT Glucose, Arterial 239 (H) 74 - 99 mg/dL    POCT Lactate, Arterial 1.6 0.4 - 2.0 mmol/L    POCT Base Excess, Arterial -0.1 -2.0 - 3.0 mmol/L    POCT HCO3 Calculated, Arterial 25.9 22.0 - 26.0 mmol/L    POCT Hemoglobin, Arterial 8.4 (L) 12.0 - 16.0 g/dL    POCT Anion Gap, Arterial 12 10 - 25 mmo/L    Patient Temperature 37.0 degrees Celsius    FiO2 21 %   POCT GLUCOSE   Result Value Ref Range    POCT Glucose 209 (H) 74 - 99 mg/dL   Calcium, ionized   Result Value Ref Range    POCT Calcium, Ionized 1.01 (L) 1.1 - 1.33 mmol/L   Magnesium   Result Value Ref Range    Magnesium 2.46 (H) 1.60 - 2.40 mg/dL   Renal Function Panel   Result Value Ref Range    Glucose 243 (H) 74 - 99 mg/dL    Sodium 135 (L) 136 - 145 mmol/L    Potassium 4.8 3.5 - 5.3 mmol/L    Chloride 97 (L) 98 - 107 mmol/L    Bicarbonate 25 21 - 32 mmol/L    Anion Gap 18 10 - 20 mmol/L    Urea Nitrogen 18 6 - 23 mg/dL    Creatinine 0.78 0.50 - 1.05 mg/dL    eGFR >90 >60 mL/min/1.73m*2    Calcium 8.4 (L) 8.6 - 10.6 mg/dL    Phosphorus 5.0 (H) 2.5 - 4.9 mg/dL    Albumin 4.0 3.4 - 5.0 g/dL   POCT GLUCOSE   Result Value Ref Range    POCT Glucose 137 (H)  74 - 99 mg/dL     *Note: Due to a large number of results and/or encounters for the requested time period, some results have not been displayed. A complete set of results can be found in Results Review.        ASSESSMENT AND PLAN:    Ms. Yimi Bowles is a 34F with a PMHx sig for stage D HFrEF (PPCM) s/p ICD s/p CardioMEMs, hypothyroidism 2/2 thyroidectomy, obesity s/p gastric bypass (2017), and SLE who is s/p OHT (3/31/2022) with a post-OHT course complicated by RIJ/RUE DVTs, leukopenia, left groin seroma, and asymptomatic 2R ACR (11/2022) treated with steroids, s/p total hysterectomy (2023), Flu A (1/2024).  Who initially presented on 2/15/2024 in the setting of acute systolic heart failure with the graft failure and cardiogenic shock.  Patient currently is on [facility] ECMO support and Impella with ongoing acute renal failure requiring CRRT.      CRRT Management Note:  #HIRAM-D, 2/2 ATN in setting of cardiogenic shock. Started on CRRT initially 2/19 and transitioned to iHD however unable to achieve optimal fluid management so transitioned back to CRRT   - Hemodynamically stable on ECMO and Impella.   - tolerating UFR per primary team.   -monitor and replete lytes as indicated.   -Of note patient is currently status 7 for heart kidney transplant listed  on 4/2/2024.  Secondary to sepsis, pneumonia.

## 2024-04-14 NOTE — PROGRESS NOTES
"Vancomycin Dosing by Pharmacy- FOLLOW UP    Charla Bowles is a 34 y.o. year old female who Pharmacy has been consulted for vancomycin dosing for pneumonia. Based on the patient's indication and renal status this patient is being dosed based on a goal trough/random level of 15-20.     Renal function is currently on [facility].    Current vancomycin dose: 1250 mg given every 24 hours    Most recent trough level: 13.9 mcg/mL    Visit Vitals  BP 99/87 (BP Location: Right arm, Patient Position: Lying) Comment: Post: 95/84   Pulse 108   Temp 36.8 °C (98.2 °F) (Temporal)   Resp (!) 38        Lab Results   Component Value Date    CREATININE 0.66 04/14/2024    CREATININE 0.78 04/13/2024    CREATININE 0.64 04/13/2024    CREATININE 0.69 04/12/2024        Patient weight is No results found for: \"PTWEIGHT\"    No results found for: \"CULTURE\"     I/O last 3 completed shifts:  In: 3420 (40.7 mL/kg) [P.O.:800; I.V.:540 (6.4 mL/kg); NG/GT:1530; IV Piggyback:550]  Out: 4304 (51.2 mL/kg) [Other:4004; Stool:300]  Weight: 84 kg   [unfilled]    Lab Results   Component Value Date    PATIENTTEMP 37.0 04/14/2024    PATIENTTEMP 37.0 04/14/2024    PATIENTTEMP 37.0 04/14/2024        Assessment/Plan    Below goal random/trough level. Orders placed for new vancomycin regimen of 750 every 12 hours to begin at 0800 .    The next level will be obtained on 4/15/24 at 0700. May be obtained sooner if clinically indicated.   Will continue to monitor renal function daily while on vancomycin and order serum creatinine at least every 48 hours if not already ordered.  Follow for continued vancomycin needs, clinical response, and signs/symptoms of toxicity.       Russell Clark, PharmD           "

## 2024-04-14 NOTE — PROGRESS NOTES
CTICU Progress Note  Charla Bowles/[id number]    Admit Date: 2/15/2024  Hospital Length of Stay: 59   ICU Length of Stay: 17d 12h   CT SURGEON: Dr. Witt    SUBJECTIVE:   Improved sleep  Net even overnight    MEDICATIONS  Infusions:  [Held by provider] dexmedeTOMIDine, Last Rate: Stopped (04/11/24 0630)  heparin Impella Purge 25 units/mL in 500 mL D5W, Last Rate: 10 mL/hr (04/14/24 0700)  lactated Ringer's, Last Rate: 5 mL/hr (04/14/24 0700)  PrismaSol 4/2.5, Last Rate: 2,400 mL/hr (04/13/24 1700)      Scheduled:  acetaminophen, 650 mg, q6h  calcium carbonate-vitamin D3, 1 tablet, Daily  cyanocobalamin, 500 mcg, Daily  darbepoetin floyd, 100 mcg, q14 days  ferrous sulfate (325 mg ferrous sulfate), 65 mg of iron, Daily with breakfast  folic acid, 1 mg, Daily  heparin (porcine), 5,000 Units, q8h  insulin lispro, 0-15 Units, q4h  levothyroxine, 250 mcg, Daily  lidocaine, 1 patch, Daily  lidocaine, 1 patch, Daily  melatonin, 10 mg, Daily  meropenem, 2 g, q12h  multivitamin with minerals, 1 tablet, Daily  nystatin, 5 mL, q6h  oxygen, , Continuous - Inhalation  pantoprazole, 40 mg, Daily  predniSONE, 10 mg, Daily  QUEtiapine, 50 mg, Nightly  [Held by provider] rosuvastatin, 10 mg, Nightly  [Held by provider] sod phos di, mono-K phos mono, 500 mg, 4x daily  sulfamethoxazole-trimethoprim, 80 mg of trimethoprim, Daily  valGANciclovir, 450 mg, q48h  vancomycin, 750 mg, q12h      PRN:  alteplase, 2 mg, PRN  bisacodyl, 10 mg, Daily PRN  calcium gluconate, 1 g, q6h PRN  calcium gluconate, 2 g, q6h PRN  dextrose, 25 g, q15 min PRN  dextrose, 25 g, q15 min PRN   Or  glucagon, 1 mg, q15 min PRN  dextrose, 25 g, q15 min PRN   Or  glucagon, 1 mg, q15 min PRN  hydrALAZINE, 5 mg, q4h PRN  hydrOXYzine HCL, 25 mg, q6h PRN  ipratropium-albuteroL, 3 mL, q6h PRN  artificial tears, 2 drop, PRN  magnesium sulfate, 2 g, q6h PRN  magnesium sulfate, 4 g, q6h PRN  microfibrllar collagen, , PRN  mupirocin, , BID PRN  naloxone, 0.2 mg, q5  "min PRN  ondansetron, 4 mg, q4h PRN  oxyCODONE, 5 mg, q6h PRN  sodium chloride, 1 spray, 4x daily PRN  vancomycin, , Daily PRN        PHYSICAL EXAM:   Visit Vitals  BP 99/87 (BP Location: Right arm, Patient Position: Lying) Comment: Post: 95/84   Pulse 107   Temp 36.6 °C (97.9 °F) (Temporal)   Resp (!) 27   Ht 1.549 m (5' 0.98\")   Wt 84 kg (185 lb 3 oz)   SpO2 100%   BMI 35.01 kg/m²   OB Status Hysterectomy   Smoking Status Never   BSA 1.9 m²     Wt Readings from Last 5 Encounters:   04/06/24 84 kg (185 lb 3 oz)   12/07/23 92.1 kg (203 lb)   12/01/23 93 kg (205 lb)   11/29/23 92.9 kg (204 lb 12.8 oz)   11/09/23 91.3 kg (201 lb 3.2 oz)     INTAKE/OUTPUT:  I/O last 3 completed shifts:  In: 3420 (40.7 mL/kg) [P.O.:800; I.V.:540 (6.4 mL/kg); NG/GT:1530; IV Piggyback:550]  Out: 4304 (51.2 mL/kg) [Other:4004; Stool:300]  Weight: 84 kg        LDA:  ECMO Cannulation Peripheral Right;Femoral artery;Femoral vein (Active)   Placement Date/Time: 03/27/24 1700   ECMO Type: Peripheral  Cannulation Site: Right;Femoral artery;Femoral vein  Line Securement: Line secured in 2 locations;Securement device;Sutures   Number of days: 10       Arterial Line 03/27/24 Right Radial (Active)   Placement Date/Time: 03/27/24 1545   Orientation: Right  Location: Radial   Number of days: 10       Ventricular Assist Device Impella 5.5 (Active)   Placement Date/Time: 03/01/24 1956   Placed by: Dr Viramontes  Hand Hygiene Completed: Yes  Site Location: Axilla right  VAD Type: Left ventricular assist device  VAD Brand: Impella 5.5   Number of days: 36       Hemodialysis Cath 04/06/24 Triple lumen Right Non-tunneled catheter Jugular (Active)   Placement Date/Time: 04/06/24 1500   Hand Hygiene Completed: Yes  Site Prep: Usual sterile procedure followed  Site Prep Agent has Completely Dried Before Insertion: Yes  All 5 Sterile Barriers Used (Gloves, Gown, Cap, Mask, Large Sterile Drape): Yes ...   Number of days: 0     Physical Exam:   - CONSTITUTION: " Critically ill on MCS  - NEUROLOGIC: A+O and CAM neg today, generally less confused. following in all 4 extremities.  Systemically weak   - CARDIOVASCULAR: ST, R fem VA ECMO, no bleeding at site. R axillary impella, no bleeding at site. No s/s infection at either site. +distal signals in bilateral lower extremities  - RESPIRATORY: Diminished bilateral lung sounds, symmetric chest expansion  - GI: Abdomen soft, non tender, non distended, +bowel sounds.  Diarrhea to FMS  - : Anuric, on CVVH via ECMO circuit  - EXTREMITIES: Warm and dry, no peripheral edema  - SKIN: Left femoral skin breakdown with dressing in place  - PSYCHIATRIC: Calm     Images: CXR c/f  pulmonary edema persisting but improving    Invasive Hemodynamics:    Most Recent Range Past 24hrs   BP (Art) 87/78 Arterial Line BP 1  Min: 83/67  Max: 102/91   MAP(Art) 81 mmHg Arterial Line MAP 1 (mmHg)   Min: 74 mmHg  Max: 95 mmHg   RA/CVP   No data recorded   PA 41/34 No data recorded   PA(mean) 37 mmHg No data recorded   CO 5.5 L/min No data recorded   CI 2.8 L/min/m2 No data recorded   Mixed Venous 41 % SVO2 (%)  Min: 41 %  Max: 46.6 %   SVR  1115 (dyne*sec)/cm5 No data recorded     ECMO:     Most Recent Range Past 24hrs   Flow 2.88 Patient Flow (L/min)  Min: 2.88  Max: 3.15   Speed 3400 Circuit Flow (RPM)  Min: 3399  Max: 3401   Sweep 5 Sweep Gas Flow Rate (L/min)  Min: 5  Max: 5      Impella:      Most Recent Range Past 24hrs   Performance Level 2 P Level  Min: 2   Min taken time: 04/14/24 0700  Max: 2   Max taken time: 04/14/24 0700   Flow (L/min) 1.3 Flow (L/min)  Min: 1   Min taken time: 04/13/24 1200  Max: 1.4   Max taken time: 04/13/24 1000   Motor Current 122/80 Motor Current  Min: 118/80   Min taken time: 04/13/24 1900  Max: 173/83   Max taken time: 04/14/24 0200   Placement Signal No  Placement OK could not be evaluated. This SmartLink does not work with rows of the type: Custom List   Purge (mmHg) 578 Purge Pressure (mmHg)  Min: 416   Min taken  time: 04/13/24 1700  Max: 582   Max taken time: 04/14/24 0600   Purge rate (mL/hr) 10 Purge Rate (mL/hr)  Min: 10   Min taken time: 04/14/24 0700  Max: 10   Max taken time: 04/14/24 0700        Daily Risk Screen:  Dialysis/Hemapheresis    Assessment/Plan   34F with a PMHx sig for stage D HFrEF (PPCM) s/p ICD s/p CardioMEMs, hypothyroidism 2/2 thyroidectomy, obesity s/p gastric bypass (2017), and SLE who is s/p OHT (3/31/2022) with a post-OHT course complicated by RIJ/RUE DVTs, leukopenia, left groin seroma, and asymptomatic 2R ACR (11/2022) treated with steroids, s/p total hysterectomy (2023), Flu A (1/2024).     She presented to [facility] ED on 2/15/24 with complaints of N/V x 3 days and inability to keep medications down. Found to have acute systolic heart failure with primary graft failure and cardiogenic shock. Treated for suspected acute cellular vs. acute antibody mediated rejection; however, multiple cardiac biopsies negative for signs of rejection or other causes of graft failure. Her course has been complicated by multiple MCS devices for refractory cardiogenic shock (right femoral IABP: 2/18/24 - 3/1/24, Left femoral VA ECMO: 2/19/24 - 2/29/24, Right axillary impella 5.5: 3/1/24 - remains in place, 2nd right femoral VA ECMO: 3/27/24 - remains in place), ARF requiring RRT, acute liver injury, intubation due to volume overload in setting of pulmonary edema, severe hemolysis 2/2 to impella malposition, mild CMV viremia, bilateral provoked IJ DVTs, multiple episodes of epistaxis & coagulopathies requiring ongoing blood product transfusions.        Charla was transferred to CTICU on 3/27/24 following right femoral VA ECMO placement due to worsening cardiogenic shock and multi-organ failure despite Impella 5.5 support and multiple inotropes. She was listed Status 1 for heart/kidney on 4/2, however was deactivated (status 7) due to c/f sepsis.  She remains critically ill in the CTICU on MCS with ECMO and an Impella  as well as CVVH.       NEURO:  She vacillates between being neuro intact with intermittent delirium and anxiety though is much improved over last few days. Insomnia supported with multimodals and REST protocol.  CAM negative this AM.  Sitting at side of bed and standing with PT- max assist to get into position but then min assist to maintain.-->  - Serial neuro and pain assessments  - Continue tylenol scheduled  - Continue lidoderm patch for back pain  - PRN oxy 5mg Q6   - Continue melatonin 10 mg HS   - Continue Seroquel 50 mg HS (Qtc 490 4/14)    - Recheck ecg today before seroquel dose   - May use precedex at bedtime for sleep if needed  - PRN hydroxyzine  - PT/OT Consult; mobilize as able  - CAM ICU score qshift. Sleep/wake cycle hygiene  - REST protocol (CXR and labs after 6am)      ENT: Multiple episodes of epistaxis with interventions by ENT. Most recently in OR 3/27 with L nare packed. Packing removed 4/1. -->  - appreciate ENT recs  - PRN ocean spray and mupiricin for dry nasal passages  - PRN afrin      Cardiac: Patient has a history of heart transplant in March of 2022 with primary graft dysfunction treated for acute cellular and antibody rejection without improvement with negative biopsy with multiple MCS devices for refractory cardiogenic shock (right femoral IABP: 2/18/24 - 3/1/24; Left femoral VA ECMO: 2/19/24 - 2/29/24; Right axillary impella 5.5: 3/1/24 - ; 2nd right femoral VA ECMO: 3/27/24 - ). Elevated LDH and difficulty with flows despite multiple attempts at repositioning Impella previously.   Current ECMO settings ~3L with FiO2 85% and sweep 5; Impella 5.5 P3 with flows 1.2 LPM Impella dropped flows and alarming in aorta this am. LDH slight uptrend. Remains sinus tachy and normotensive. -->  - Maintain goal MAP 70-90  - Continue full BiV support with ECMO  - Continue Impella at P3, for LV Venting  - Echo today to check impella placement  - Holding rosuvastatin with mild uptrending in LFT  -  Trend daily LDH   - Hopefully increase to status 1A again this week  - Hold home amlodipine     Vascular: 3/27 arterial duplex with proximal SFA occlusion with collateral flow. C/f LLE ischemia resolved. Feet warm with intact motor exam. -->  - appreciate vascular surgery following     PULM: She has been intubated multiple times throughout hospital course for procedures. Acute respiratory failure persisting due to acute pulmonary edema and CT chest 4/9 with severe multifocal PNA. Cxr improving with some cardiogenic pulmonary edema. Gas exchange augmented by VA ECMO, occasionally on NC for comfort.  -->  - CXR daily  - Adjust sweep for pH 7.3 - 7.5  - Maintain SPO2 > 92%     GI: History of gastric bypass and MMF colitis. Shock liver originally resolved; most recently elevated r/t likely congestive hepatopathy that is improving on ECMO. Abdominal pain improved with reduced myfortic dosing. Previous c/f GI bleed, likely blood from epistaxis; no current evidence of GI bleeding. H pylori negative, fecal calprotectin (elevated at 380), fecal lactoferrin (Positive 03/23/24). Poor nutritional intake.  Liver ultrasound 4/6 unconcerning. Dobhoff placed by ENT 4/8 under fiberoptic visualization.  Concern for aspiration in setting of PNA, SLP eval 4/10 was concerning for coughing with solids and they recommended sips & chips only for now.  Tolerating goal TF and continued liquid stool. -->  - Continue calcitriol 0.5mg daily, multivitamin, calcium carbonate 1,250mg BID  - Continue PPI daily  - TF on hold due to nasuea/vomitting overnight  - KUB  - HOLD PO diet per SLP rec's, sips and chips ok, speech to re-evaluate  - Hold Ensure Clear TID and magic cup  - Resume miralax BID & senna-colace BID     :  No history, baseline Cr 1.2-1.3. HIRAM originally on CVVH then with renal recovery but then again worsened and now dialysis dependent and failed diuretic challenges. Hypervolemia improved but continued pulmonary edema.  Hypophosphatemia acceptably repleted.  -->  - RFP as clinically indicated, replete electrolytes per CTICU protocol.   - Continue CVVH with goal removal -500 pending CVP goal 8-10   - Continue Sodium Phos 500 mg 4x daily  - Nephrology Following     ENDO: History of hypothyroidism and prediabetes. TSH elevated originally to malabsorption then with dosing adjusted by endo; TSH (3/17): 20.74, T4 1.11, T3: 1.4, improved 4/1; TSH 10.13, T4 0.70. 4/8: TSH 19.48, T3 0.8, T4 0.68. Steroid induced hyperglycemia acceptable glycemic control on SSI. -->  - Maintain BG <180 with hypoglycemia protocol  - Continue SSI #1 AC/HS   - Continue Synthroid 250mcg daily.   - Recheck TFT's 4/16 (ordered)   - Appreciate Endocrine Consult      HEME: History of iron deficiency anemia and remote DVT; again with acute DVT LIJ and SVT left cephalic vein and right cephalic vein. Acute blood loss anemia with repeated transfusions with significant hemolysis but no evidence of active bleeding; last received RBC 4/5. Stable thrombocytopenia persisting; last PF4 negative 3/30. No recent transfusion requirement. Coagulopathy r/t likely poor nutrition resolved with vit K x 3 doses (last 4/10). Heparin assay 0.3 on SQH and purge alone.  -->  - Continue ferrous sulfate daily  - Avoid unnecessarily transfusing, HGB > 7.0  - Hold systemic heparin with coagulopathy & recent anemia, re-eval daily. Continue SQH  - Continue heparin purge in the Impella.   - Trend coags daily  - SCDs for DVT prophylaxis  - Aranesp every 2 weeks  - Last type and screen: 4/11-resend today     Rejection/prophylaxis: S/p heart transplant 3/31/2022. Induction basiliximab. Donor HCV -, toxo -/-, CMV -/+. Mild ACR with +CD4 and negative HLAs however clinical presentation concern for acute cellular vs AMR rejection/stuttering rejection completed courses of thymo/PLEX/IVIG without clinical improvement.  With consideration to progressive graft failure and concern for infection limiting  re-transplant at this time, we are holding tacro and myfortic (4/9 - ).  - Steroids: Continue PO prednisone 10 mg daily  - Hold Tacrolimus: 4.0 mg AM/3.5 mg PM w/ daily levels drawn @ 0600  - Hold Tacrolimus goal troughs: 8-10  - Hold Myfortic acid: 360mg BID.  (Not starting MMF d/t hx of colitis)  - Antifungals: nystatin 500,000units Q6  - Antivirals: valcyte 450mg Q48hrs  - Anti PCP & Toxoplasmosis: continue SS Bactrim with c/f PNA     ID: C/f previous yeast infection. BC 3/27 NGTD final. MRSA screen negative 3/28. Episode of hypotension on 4/1, pan cultured and empiric Vanco/Zosyn started.  Patient was shivering 4/3, concern for risk of infection. Blood cultures sent 4/3, NGTD. Zosyn (4/1- 4/7) then broadened to Susan (4-7 - ) and natalie started (4/7 - ). CT chest 4/9 demonstrates severe multifocal PNA.  UA culture with enterococcus but difficult to define as infection given anuric state.  Beta-D glucan mildly elevated 195 thought to be 2/2 recent IVIG though could also be indicative of PJP. Uptrending leukocytosis again with shivering.  -->  - Trend temp q4h  - Continue Susan (4/7-  - Continue Vanco (4/6-  - Reculture today due to worsening leukocytosis  linezolid for VRE with ID--> do not treat per ID. Recommending repeat UA but no urine to send.  - Unable to obtain better sputum sample and avoiding NG placement with epistaxis history. Discussing benefit/risk of intubating and bronching for sample vs empirically treating with treatment dose of bactrim. Will hold off since pt improving currently--> recommending not to empirically treat for PJP and consider bronch for better sample. Concern about intubating for bronch and potential for being able to extubated.       Skin: Left groin wound from previous ECMO with wound consulted. Wound cx with NG 3/15.   - Preventative Mepilex dressings in place on sacrum and heels  - Change preventative Mepilex weekly or more frequently as indicated (when moist/soiled)   - Every  shift skin assessment per nursing and weekly ICU skin rounds  - Moisture barrier to be applied with christopher care  - Active skin problems addressed with nursing on daily rounds  - wound recs from note 3/15 ordered      Proph:  SCDs  SQH & heparin purge   PPI     G: Lines  RIJ Trialysis - 4/6  R radial maxwell 4/11  PIV x2    Restraints: Not indicated    Code status: Full Code    I personally spent 35 minutes of critical care time directly and personally managing the patient exclusive of separately billable procedures.     A,B,C,D,E,F,G: reviewed.    A&P reviewed on multidisciplinary critical care rounds      Dispo: CTICU care for now.    CTICU TEAM PHONE [phone]

## 2024-04-14 NOTE — PROGRESS NOTES
ECMO:  Cannulation Date: (most recent) 3/27  ECMO Indication: Cardiogenic Shock  Current Goals of Care: Full Code    ECMO Cannula Configuration: Right femoral venous-right femoral arterial   ECMO circuit run from drainage cannula to pump to oxygenator to return cannula: Yes  Pre/post PaO2 adequate: Yes  LV Unloading/Pulse Pressure: Right axillary Impella 5.5 at P2, 1L flow   Distal Perfusion: R DPC in place, adequate pulses    ECMO Settings:     Most Recent Range Past 24hrs   Flow 2.94 Patient Flow (L/min)  Min: 2.88  Max: 3.15   Speed 3570 Circuit Flow (RPM)  Min: 3399  Max: 3570   Sweep 5 Sweep Gas Flow Rate (L/min)  Min: 5  Max: 5      Hemodynamic Management:[]    Invasive Hemodynamics:    Most Recent Range Past 24hrs   BP (Art) 92/80 Arterial Line BP 1  Min: 83/67  Max: 102/91   MAP(Art) 86 mmHg Arterial Line MAP 1 (mmHg)   Min: 74 mmHg  Max: 95 mmHg   RA/CVP   No data recorded   PA 41/34 No data recorded   PA(mean) 37 mmHg No data recorded   CO 5.5 L/min No data recorded   CI 2.8 L/min/m2 No data recorded   Mixed Venous 41 % SVO2 (%)  Min: 41 %  Max: 46.6 %   SVR  1115 (dyne*sec)/cm5 No data recorded     Ventilator Management:    Not intubated    Bleeding/Clot Issues:   Anticoagulation/Monitoring: Heparin through Impella purge and heparin assay  Presence of cannula bleeding: None at present  Presence of oxygenator clot: None at present    Management Issues:  Road Trips planned for today? None for today  Mobility Planned today? Sit at side of bed   Weaning Plan/date: Currently status 7 on transplant list for heart/kidney; Will re-assess on Monday for possible re-listing.  Family Updates/Concerns? Family updated and palliative care consult entered    Impella:      Most Recent Range Past 24hrs   Performance Level 3 P Level  Min: 2   Min taken time: 04/14/24 0800  Max: 3   Max taken time: 04/14/24 0900   Flow (L/min) 1.1 Flow (L/min)  Min: 1   Min taken time: 04/14/24 0800  Max: 1.4   Max taken time: 04/13/24 1000    Motor Current 109/79 Motor Current  Min: 109/79   Min taken time: 04/14/24 0900  Max: 173/83   Max taken time: 04/14/24 0200   Placement Signal No  Placement OK could not be evaluated. This SmartLink does not work with rows of the type: Custom List   Purge (mmHg) 535 Purge Pressure (mmHg)  Min: 416   Min taken time: 04/13/24 1700  Max: 582   Max taken time: 04/14/24 0600   Purge rate (mL/hr) 10 Purge Rate (mL/hr)  Min: 10   Min taken time: 04/14/24 0900  Max: 10   Max taken time: 04/14/24 0900     Patient requires ECMO support. Drainage cannula site, cannula, pump, oxygenator, return cannula and return cannula site clinically inspected. RPM and sweep reviewed and adjusted appropriate to clinical context. Anticoagulation status and intensity discussed. Plan for weaning, next clinical steps discussed with team and family. Discussed with cardiothoracic surgeon, perfusion, palliative care and physical/occupational therapy.     I, as the attending critical care physician, have personally reviewed the recent patient history, physical exam, vital signs, significant labs, medications, imaging, and ECMO settings/flows of this critically ill patient. Using this information I will continue to actively manage this critically ill patient's extracorporeal membrane oxygenation (ECMO)/extracorporeal life support (ECLS) as documented in the assessment and plan above.      I spent 47 minutes in the professional and overall care of this patient.

## 2024-04-14 NOTE — PROGRESS NOTES
[facility]  ADVANCED HEART FAILURE AND TRANSPLANT CARDIOLOGY   Charla Bowles/[id number]    Admit Date: 2/15/2024  Hospital Length of Stay: 59   ICU Length of Stay: 17d 16h   Primary Service: CTICU    hCarla Bowles is a 34 y.o. female on day 59 of admission presenting with Cardiogenic shock (Multi).    Subjective   Impella positional alarms reported overnight. She remains on VA-ECMO support at 2.7 lpm, Impella 5.5 P-2. Still on CVVH at 250 ml/hr. Net input/output - 105 ml. No febrile episode (T max 98.2 F). She denies chest pain, palpitations, dyspnea, abdominal pain, nausea/vomiting. No reported GI bleeding.       Objective     Physical Exam  Constitutional:       Appearance: She is ill-appearing.   HENT:      Head: Normocephalic.      Comments: Not actively bleeding      Mouth/Throat:      Mouth: Mucous membranes are moist.      Pharynx: Oropharynx is clear. No oropharyngeal exudate or posterior oropharyngeal erythema.   Eyes:      Extraocular Movements: Extraocular movements intact.      Conjunctiva/sclera: Conjunctivae normal.      Pupils: Pupils are equal, round, and reactive to light.   Neck:      Vascular: No JVD.   Cardiovascular:      Rate and Rhythm: Tachycardia present.      Comments: Right axillary impella in place ~45cm at hub (no hematoma), Right femoral VA ECMO in place with reperfusion catheter (site appears clean and dry). Dopplerable radial and ulnar pulses bilaterally. Dopplerable PT pulses bilateral. Unable to doppler DP bilaterally.  Pulmonary:      Effort: No accessory muscle usage, respiratory distress or retractions.      Breath sounds: Normal breath sounds. No wheezing, rhonchi or rales.      Comments: RA. Tachypnea w/ RR 20-30. Small amount of blood tinged and purulent sputum.  Abdominal:      General: Abdomen is flat. Bowel sounds are normal. There is no distension.      Palpations: Abdomen is soft. There is no mass.      Hernia: No hernia is  "present.   Musculoskeletal:         General: No swelling (+1 BLE edema) or tenderness. Normal range of motion.      Cervical back: Full passive range of motion without pain.   Skin:     General: Skin is dry.      Capillary Refill: Capillary refill takes less than 2 seconds.      Coloration: Skin is not jaundiced.      Findings: Bruising and lesion (Left groin wound appears clean with granulation tissue. No signs of infection.) present. No erythema, laceration, rash or wound.   Neurological:      General: No focal deficit present.      Mental Status: She is alert and oriented to person, place, and time.   Psychiatric:         Mood and Affect: Mood is not anxious.         Behavior: Behavior normal.       Last Recorded Vitals  Blood pressure 99/87, pulse 110, temperature 36.6 °C (97.9 °F), temperature source Temporal, resp. rate (!) 39, height 1.549 m (5' 0.98\"), weight 84 kg (185 lb 3 oz), SpO2 100%.  Intake/Output last 3 Shifts:  I/O last 3 completed shifts:  In: 3420 (40.7 mL/kg) [P.O.:800; I.V.:540 (6.4 mL/kg); NG/GT:1530; IV Piggyback:550]  Out: 4304 (51.2 mL/kg) [Other:4004; Stool:300]  Weight: 84 kg     Relevant Results  Scheduled medications  acetaminophen, 650 mg, oral, q6h  calcium carbonate-vitamin D3, 1 tablet, oral, Daily  cyanocobalamin, 500 mcg, oral, Daily  darbepoetin floyd, 100 mcg, subcutaneous, q14 days  ferrous sulfate (325 mg ferrous sulfate), 65 mg of iron, oral, Daily with breakfast  folic acid, 1 mg, oral, Daily  heparin (porcine), 5,000 Units, subcutaneous, q8h  insulin lispro, 0-15 Units, subcutaneous, q4h  levothyroxine, 250 mcg, oral, Daily  lidocaine, 1 patch, transdermal, Daily  lidocaine, 1 patch, transdermal, Daily  melatonin, 10 mg, oral, Daily  meropenem, 2 g, intravenous, q12h  multivitamin with minerals, 1 tablet, oral, Daily  nystatin, 5 mL, Swish & Swallow, q6h  oxygen, , inhalation, Continuous - Inhalation  pantoprazole, 40 mg, intravenous, Daily  perflutren lipid microspheres, " 0.5-10 mL of dilution, intravenous, Once in imaging  perflutren protein A microsphere, 0.5 mL, intravenous, Once in imaging  polyethylene glycol, 17 g, oral, Daily  predniSONE, 10 mg, oral, Daily  QUEtiapine, 50 mg, oral, Nightly  [Held by provider] rosuvastatin, 10 mg, oral, Nightly  sennosides-docusate sodium, 2 tablet, oral, BID  [Held by provider] sod phos di, mono-K phos mono, 500 mg, oral, 4x daily  sulfamethoxazole-trimethoprim, 80 mg of trimethoprim, oral, Daily  sulfur hexafluoride microsphr, 2 mL, intravenous, Once in imaging  valGANciclovir, 450 mg, oral, q48h  vancomycin, 750 mg, intravenous, q12h      Continuous medications  [Held by provider] dexmedeTOMIDine, 0.1-1.5 mcg/kg/hr, Last Rate: Stopped (04/11/24 0630)  heparin Impella Purge 25 units/mL in 500 mL D5W, 10 mL/hr, Last Rate: 10 mL/hr (04/14/24 0700)  lactated Ringer's, 5 mL/hr, Last Rate: 5 mL/hr (04/14/24 0700)  PrismaSol 4/2.5, 2,400 mL/hr, Last Rate: 2,400 mL/hr (04/13/24 1700)      PRN medications  PRN medications: alteplase, bisacodyl, calcium gluconate, calcium gluconate, dextrose, dextrose **OR** glucagon, dextrose **OR** glucagon, hydrALAZINE, hydrOXYzine HCL, ipratropium-albuteroL, artificial tears, magnesium sulfate, magnesium sulfate, microfibrllar collagen, mupirocin, naloxone, ondansetron, oxyCODONE, sodium chloride, vancomycin     Results for orders placed or performed during the hospital encounter of 02/15/24 (from the past 24 hour(s))   Blood Gas Arterial Full Panel   Result Value Ref Range    POCT pH, Arterial 7.34 (L) 7.38 - 7.42 pH    POCT pCO2, Arterial 48 (H) 38 - 42 mm Hg    POCT pO2, Arterial 159 (H) 85 - 95 mm Hg    POCT SO2, Arterial 100 94 - 100 %    POCT Oxy Hemoglobin, Arterial 96.0 94.0 - 98.0 %    POCT Hematocrit Calculated, Arterial 25.0 (L) 36.0 - 46.0 %    POCT Sodium, Arterial 133 (L) 136 - 145 mmol/L    POCT Potassium, Arterial 4.9 3.5 - 5.3 mmol/L    POCT Chloride, Arterial 100 98 - 107 mmol/L    POCT Ionized  Calcium, Arterial 1.06 (L) 1.10 - 1.33 mmol/L    POCT Glucose, Arterial 239 (H) 74 - 99 mg/dL    POCT Lactate, Arterial 1.6 0.4 - 2.0 mmol/L    POCT Base Excess, Arterial -0.1 -2.0 - 3.0 mmol/L    POCT HCO3 Calculated, Arterial 25.9 22.0 - 26.0 mmol/L    POCT Hemoglobin, Arterial 8.4 (L) 12.0 - 16.0 g/dL    POCT Anion Gap, Arterial 12 10 - 25 mmo/L    Patient Temperature 37.0 degrees Celsius    FiO2 21 %   POCT GLUCOSE   Result Value Ref Range    POCT Glucose 209 (H) 74 - 99 mg/dL   Calcium, ionized   Result Value Ref Range    POCT Calcium, Ionized 1.01 (L) 1.1 - 1.33 mmol/L   Magnesium   Result Value Ref Range    Magnesium 2.46 (H) 1.60 - 2.40 mg/dL   Renal Function Panel   Result Value Ref Range    Glucose 243 (H) 74 - 99 mg/dL    Sodium 135 (L) 136 - 145 mmol/L    Potassium 4.8 3.5 - 5.3 mmol/L    Chloride 97 (L) 98 - 107 mmol/L    Bicarbonate 25 21 - 32 mmol/L    Anion Gap 18 10 - 20 mmol/L    Urea Nitrogen 18 6 - 23 mg/dL    Creatinine 0.78 0.50 - 1.05 mg/dL    eGFR >90 >60 mL/min/1.73m*2    Calcium 8.4 (L) 8.6 - 10.6 mg/dL    Phosphorus 5.0 (H) 2.5 - 4.9 mg/dL    Albumin 4.0 3.4 - 5.0 g/dL   POCT GLUCOSE   Result Value Ref Range    POCT Glucose 137 (H) 74 - 99 mg/dL   POCT GLUCOSE   Result Value Ref Range    POCT Glucose 215 (H) 74 - 99 mg/dL   Reticulocytes   Result Value Ref Range    Retic % 5.9 (H) 0.5 - 2.0 %    Retic Absolute 0.135 (H) 0.018 - 0.083 x10*6/uL    Reticulocyte Hemoglobin 34 28 - 38 pg    Immature Retic fraction 36.2 (H) <=16.0 %   CBC and Auto Differential   Result Value Ref Range    WBC 18.6 (H) 4.4 - 11.3 x10*3/uL    nRBC 8.4 (H) 0.0 - 0.0 /100 WBCs    RBC 2.28 (L) 4.00 - 5.20 x10*6/uL    Hemoglobin 6.8 (L) 12.0 - 16.0 g/dL    Hematocrit 21.4 (L) 36.0 - 46.0 %    MCV 94 80 - 100 fL    MCH 29.8 26.0 - 34.0 pg    MCHC 31.8 (L) 32.0 - 36.0 g/dL    RDW 21.2 (H) 11.5 - 14.5 %    Platelets 99 (L) 150 - 450 x10*3/uL    Immature Granulocytes %, Automated 11.6 (H) 0.0 - 0.9 %    Immature  Granulocytes Absolute, Automated 2.15 (H) 0.00 - 0.70 x10*3/uL   Lactate Dehydrogenase   Result Value Ref Range    LDH 1,531 (H) 84 - 246 U/L   Hepatic function panel   Result Value Ref Range    Albumin 4.0 3.4 - 5.0 g/dL    Bilirubin, Total 2.1 (H) 0.0 - 1.2 mg/dL    Bilirubin, Direct 0.9 (H) 0.0 - 0.3 mg/dL    Alkaline Phosphatase 324 (H) 33 - 110 U/L    ALT 31 7 - 45 U/L    AST 82 (H) 9 - 39 U/L    Total Protein 6.3 (L) 6.4 - 8.2 g/dL   Vancomycin, Trough   Result Value Ref Range    Vancomycin, Trough 13.9 5.0 - 20.0 ug/mL   Calcium, ionized   Result Value Ref Range    POCT Calcium, Ionized 1.07 (L) 1.1 - 1.33 mmol/L   Magnesium   Result Value Ref Range    Magnesium 2.61 (H) 1.60 - 2.40 mg/dL   Heparin Assay, UFH   Result Value Ref Range    Heparin Unfractionated 0.3 See Comment Below for Therapeutic Ranges IU/mL   Basic Metabolic Panel   Result Value Ref Range    Glucose 106 (H) 74 - 99 mg/dL    Sodium 137 136 - 145 mmol/L    Potassium 3.9 3.5 - 5.3 mmol/L    Chloride 98 98 - 107 mmol/L    Bicarbonate 28 21 - 32 mmol/L    Anion Gap 15 10 - 20 mmol/L    Urea Nitrogen 18 6 - 23 mg/dL    Creatinine 0.66 0.50 - 1.05 mg/dL    eGFR >90 >60 mL/min/1.73m*2    Calcium 8.2 (L) 8.6 - 10.6 mg/dL   Phosphorus   Result Value Ref Range    Phosphorus 3.8 2.5 - 4.9 mg/dL   Manual Differential   Result Value Ref Range    Neutrophils %, Manual 92.3 40.0 - 80.0 %    Lymphocytes %, Manual 1.7 13.0 - 44.0 %    Monocytes %, Manual 3.4 2.0 - 10.0 %    Eosinophils %, Manual 0.9 0.0 - 6.0 %    Basophils %, Manual 0.0 0.0 - 2.0 %    Myelocytes %, Manual 1.7 0.0 - 0.0 %    Seg Neutrophils Absolute, Manual 17.17 (H) 1.20 - 7.00 x10*3/uL    Lymphocytes Absolute, Manual 0.32 (L) 1.20 - 4.80 x10*3/uL    Monocytes Absolute, Manual 0.63 0.10 - 1.00 x10*3/uL    Eosinophils Absolute, Manual 0.17 0.00 - 0.70 x10*3/uL    Basophils Absolute, Manual 0.00 0.00 - 0.10 x10*3/uL    Myelocytes Absolute, Manual 0.32 0.00 - 0.00 x10*3/uL    Total Cells  Counted 117     RBC Morphology See Below     Polychromasia Mild     Hypochromia Mild     RBC Fragments Few    PST Top   Result Value Ref Range    Extra Tube Hold for add-ons.    Blood Gas Arterial Full Panel   Result Value Ref Range    POCT pH, Arterial 7.42 7.38 - 7.42 pH    POCT pCO2, Arterial 43 (H) 38 - 42 mm Hg    POCT pO2, Arterial 209 (H) 85 - 95 mm Hg    POCT SO2, Arterial 100 94 - 100 %    POCT Oxy Hemoglobin, Arterial 96.5 94.0 - 98.0 %    POCT Hematocrit Calculated, Arterial 22.0 (L) 36.0 - 46.0 %    POCT Sodium, Arterial 135 (L) 136 - 145 mmol/L    POCT Potassium, Arterial 4.2 3.5 - 5.3 mmol/L    POCT Chloride, Arterial 102 98 - 107 mmol/L    POCT Ionized Calcium, Arterial 1.12 1.10 - 1.33 mmol/L    POCT Glucose, Arterial 113 (H) 74 - 99 mg/dL    POCT Lactate, Arterial 1.4 0.4 - 2.0 mmol/L    POCT Base Excess, Arterial 3.1 (H) -2.0 - 3.0 mmol/L    POCT HCO3 Calculated, Arterial 27.9 (H) 22.0 - 26.0 mmol/L    POCT Hemoglobin, Arterial 7.2 (L) 12.0 - 16.0 g/dL    POCT Anion Gap, Arterial 9 (L) 10 - 25 mmo/L    Patient Temperature 37.0 degrees Celsius    FiO2 21 %   ECMO ARTERIAL FULL PANEL   Result Value Ref Range    POCT pH, Arterial ECMO 7.40 Reference range not established pH    POCT pCO2, Arterial ECMO 44 Reference range not established mm Hg    POCT pO2,  Arterial ECMO 186 Reference range not established mm Hg    POCT SO2, Arterial ECMO 100 Reference range not established %    POCT Oxy Hemoglobin, Arterial ECMO 95.0 Reference range not established %    POCT Hematocrit Calculated, Arterial ECMO 22.0 (L) 36.0 - 46.0 %    POCT Sodium, Arterial  ECMO 135 (L) 136 - 145 mmol/L    POCT Potassium, Arterial  ECMO 3.9 3.5 - 5.3 mmol/L    POCT Chloride, Arterial  ECMO 103 98 - 107 mmol/L    POCT Ionized Calcium, Arterial  ECMO 1.09 (L) 1.10 - 1.33 mmol/L    POCT Glucose, Arterial  ECMO 98 74 - 99 mg/dL    POCT Lactate Arterial  ECMO 1.3 0.4 - 2.0 mmol/L    POCT Base Excess, Arterial ECMO 2.2 Reference range not  established mmol/L    POCT HCO3 Calculated, Arterial ECMO 27.3 Reference range not established mmol/L    POCT Hemoglobin, Arterial  ECMO 7.2 (L) 12.0 - 16.0 g/dL    POCT Anion Gap, Arterial  ECMO 9 Reference range not established mmo/L    Patient Temperature 37.0 degrees Celsius    FiO2 85 %   ECMO VENOUS FULL PANEL   Result Value Ref Range    POCT pH, Venous ECMO 7.34 Reference range not established pH    POCT pCO2, Venous ECMO 54 Reference range not established mm Hg    POCT pO2,  Venous  ECMO 26 Reference range not established mm Hg    POCT SO2, Venous ECMO 41 Reference range not established %    POCT Oxy Hemoglobin, Venous ECMO 39.5 Reference range not established %    POCT Hematocrit Calculated, Venous ECMO 21.0 (L) 36.0 - 46.0 %    POCT Sodium, Venous ECMO 137 136 - 145 mmol/L    POCT Potassium, Venous ECMO 4.2 3.5 - 5.3 mmol/L    POCT Chloride, Venous ECMO 101 98 - 107 mmol/L    POCT Ionized Calcium, Venous ECMO 1.14 1.10 - 1.33 mmol/L    POCT Glucose, Venous ECMO 106 (H) 74 - 99 mg/dL    POCT Lactate Venous ECMO 1.2 0.4 - 2.0 mmol/L    POCT Base Excess, Venous ECMO 2.9 Reference range not established mmol/L    POCT HCO3 Calculated, Venous ECMO 29.1 Reference range not established mmol/L    POCT Hemoglobin, Venous ECMO 6.9 (L) 12.0 - 16.0 g/dL    POCT Anion Gap, Venous ECMO 11 Reference range not established mmo/L    Patient Temperature 37.0 degrees Celsius    FiO2 85 %   POCT GLUCOSE   Result Value Ref Range    POCT Glucose 79 74 - 99 mg/dL   POCT GLUCOSE   Result Value Ref Range    POCT Glucose 143 (H) 74 - 99 mg/dL     *Note: Due to a large number of results and/or encounters for the requested time period, some results have not been displayed. A complete set of results can be found in Results Review.        Malnutrition Diagnosis Status: Ongoing  Malnutrition Diagnosis: Moderate malnutrition related to acute disease or injury  As Evidenced by: pt's reported intake likely meeting <75% of estimated needs for  at least 7 days, previous 5% weight loss in 1 month, LOS 42.  I agree with the dietitian's malnutrition diagnosis.      Assessment/Plan   Ms. Yimi Bowles is a 34F with a PMHx sig for stage D HFrEF (PPCM) s/p ICD s/p CardioMEMs, hypothyroidism 2/2 thyroidectomy, obesity s/p gastric bypass (2017), and SLE who is s/p OHT (3/31/2022) with a post-OHT course complicated by RIJ/RUE DVTs, leukopenia, left groin seroma, and asymptomatic 2R ACR (11/2022) treated with steroids, s/p total hysterectomy (2023), Flu A (1/2024).     Originally presented to [facility] ED on 2/15/24 with complaints of N/V x 3 days and inability to keep medications down. Found to have acute systolic heart failure with primary graft failure and cardiogenic shock. Treated for suspected acute cellular vs. acute antibody mediated rejection; however, multiple cardiac biopsies negative for pathology indicating rejection or other causes of graft failure. Course has been complicated by multiple MCS devices for refractory cardiogenic shock (right femoral IABP: 2/18/24 - 3/1/24, Left femoral VA ECMO: 2/19/24 - 2/29/24, Right axillary impella 5.5: 3/1/24 - remains in place, 2nd right femoral VA ECMO: 3/27/24 - remains in place), ARF requiring RRT, acute liver injury, intubation due to hypoxic respiratory failure, severe hemolysis 2/2 to impella malposition, mild CMV viremia, bilateral provoked IJ DVTs, multiple episodes of epistaxis & coagulopathies requiring ongoing blood product transfusions, & HCAP.       Transferred to CTICU on 3/27/24 following right femoral VA ECMO placement due to worsening cardiogenic shock and multi-organ failure despite Impella 5.5 support and multiple inotropes. Attempting for retransplantation of heart and new kidney transplantation.     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   #UTI w/ VRE?        Donor/Recipient Infectious history:  CMV: -/+ (low grade CMV viremia w/ levels < 35 on 3/1/24, repeat CMV levels remain negative since 4/7/24)  Toxo: -/-   Hep C: -/-     Rejection/Prophylaxis (transplants):  - Steroids: Continue 10mg PO prednisone daily (risk for adrenal insufficiency if stopped)  - Tacrolimus: stopped on 4/9/24 due to infection   - Myfortic acid: stopped on 4/9/24 due to infection  - Antifungals: nystatin 500,000units Q6  - Antivirals: valcyte 450mg Q48hrs   - Anti PCP & Toxoplasmosis: bactrim 200-40mg daily      Last cardiac biopsy: 2/16/24 with ACR grade 1R and no AMR (extremely low C4d+ detected), 2/20/24 negative for ACR and AMR  Last HLA: 4/2/24 negative for class 1 or 2 antibodies   Last RHC: closing numbers on 3/16/24 /86(97) RA 20, PAP 40/33(37), C.O./C.I. 5.5/2.8, PVR 88, SVR 1115   Last LHC: 2/18/24 negative for CAV and CAD   Last TTE/BOB: 4/6/24 shows severe LVEF w/ global hypokinesis, severe RV dysfunction, mild-mod MR, mild TR and impella angled posteriorly   Osteopenia/osteoporosis prophylaxis: Vitamin D3 currently held. Continue calcium supplements  Peptic/gastric ulcer prophylaxis: Pantoprazole 40mg daily  CAV Prophylaxis: Holding Aspirin 81mg due to continued thrombocytopenia. Holding rosuvastatin due to transaminases.      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 Broad differential including SLE flair, giant cell vasculitis, CAV/CAD, viral cardiomyopathy, sarcoidosis, amyloidosis and allograft rejection amongst many others. 2xallograft biopsies on 2/16 & 2/20 remain negative for any significant pathology. Notably, both biopsies taken after rejection therapies implemented which may have reduced areas of graft damage.    - DSAs remain negative; however, patient may have  non-HLA antibodies present. 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   - Negative CAV & CAD via LHC on 2/18/24   - Completed methylpred steroid pulse w/ 1g Q24 x 3 days (2/16-2/18) and prednisone taper   - Thymoglobulin doses: 2/18 & 2/19   - IVIG + PLEX Session: 2/18, 2/20, 3/7, 3/11 and 3/13    - Right femoral VA ECMO flowing 2.7L w/ FIO2 80% and sweep 4  - Right axillary impella for LV venting remains in place and set P2 w/ flows of 1.0  - Impella remains poorly positioned and appears to be in contact with mitral valve leaflets. LDH remains elevated but stable.   - LFTs remain elevated but stable  - Failed formal swallow eval on 4/10/24; ENT placed dobhoff w/ tubefeeds for nutritional support   - Delirium and anxiety improving   - Sitting at edge of bed and standing daily w/ PT/OT & CTICU team assistance   - CT scan on 4/9/24 shows bilateral pulmonary infiltrates throughout lung fields, L>R, confirming HCAP. Unable to obtain adequate respiratory culture.   - UC from 4/9 also growing VRE    - Elevated fungitell beta-D serum level; normally indicative of invasive fungal infections vs. PCP infection. ID recommending rechecking level in 1xweek as multiple confounding factors may lead to false elevation   - Remains on broad spectrum antibiotics w/ IV vancomycin, micafungin and meropenem     Transplant Status:  - Was listed Status 1 for combined heart-kidney (listed in UNOS on 4/2/24); however, on 4/6 changed to UNOS Status 7 given development of new infection, coagulopathy, encephalopathy/delirium. Assessing upgrade in status daily.   - ABO status: O+  - CMV+/EBV+/Toxo-/Hep B-/Hep C-  - Prospective cross match with every donor offer  - Due to potential risk of hyperacute allograft rejection, induction plan will be 500mg solumedrol x 2 (followed by steroid taper) and thymoglobulin       Recommendations:  - Continue heparin purge through impella   - Repeat TTE (limited) to evaluate Impella positioning  - Hold  off on bronchoscopy at this time.   - Anticipating reactivation to Status 1 within the week.   - Follow up sputum, blood cultures.  - Re-check thyroid function test    Continue excellent care per CTICU team.      Patient was examined and discussed with Advanced Heart Failure and Transplant Cardiology attending physician, Dr. Pretty Toussaint.    Heart Transplant Team:   [employer] Secure Chat  [id number] during day shift  [id number] during night shift     Nasir Piper MD, PGY-4  Heart Failure fellow

## 2024-04-15 NOTE — PROGRESS NOTES
Subjective   Interval History:         Patient seen during early afternoon rounds        Patient awake and following with her mom.  Had a blanket over her head as she was called.  No acute distress.       Patient showed me a picture of the bloody scant sputum that she collected 4/13/2024 as instructed at the bedside.  Small amount of saline was added to facilitate sample processing.  Currently 4/13/2024 sample pending for respiratory pathogen PCR analysis.  I asked the patient to obtain a sputum again today 4/14/2024 so I could have laboratory process to be sure only respiratory araceli present in sputum/saliva.  No suspicion for staff aureus or enteric pathogen at this time but have not identified reason for patient's pneumonia (in addition to heart failure, ARDS component, cardiogenic instability.      Objective   Range of Vitals (last 24 hours)  Heart Rate:  []   Temp:  [36.6 °C (97.9 °F)-36.8 °C (98.2 °F)]   Resp:  [17-39]   SpO2:  [90 %-100 %]   Daily Weight  04/06/24 : 84 kg (185 lb 3 oz)    Body mass index is 35.01 kg/m².    Physical Exam  On ECMO  Has Impella device  On dialysis  Alert and oriented  No acute distress  Complains of being cold now while back on dialysis  Has blanket over her head      Relevant Results  Labs  Results from last 72 hours   Lab Units 04/14/24  1417 04/14/24  0514 04/13/24  0246 04/12/24  1237 04/12/24  0014   WBC AUTO x10*3/uL 22.5* 18.6* 14.0*   < > 13.1*   HEMOGLOBIN g/dL 7.0* 6.8* 7.1*   < > 7.8*   HEMATOCRIT % 22.6* 21.4* 21.4*   < > 22.4*   PLATELETS AUTO x10*3/uL 136* 99* 100*   < > 118*   NEUTROS PCT AUTO %  --   --  81.5  --  79.2   LYMPHO PCT MAN %  --  1.7  --   --   --    LYMPHS PCT AUTO %  --   --  2.2  --  2.7   MONO PCT MAN %  --  3.4  --   --   --    MONOS PCT AUTO %  --   --  9.3  --  11.1   EOSINO PCT MAN %  --  0.9  --   --   --    EOS PCT AUTO %  --   --  0.8  --  0.5    < > = values in this interval not displayed.     Results from last 72 hours   Lab Units  04/14/24  1417 04/14/24  0514 04/13/24  1633   SODIUM mmol/L 136 137 135*   POTASSIUM mmol/L 4.1 3.9 4.8   CHLORIDE mmol/L 96* 98 97*   CO2 mmol/L 26 28 25   BUN mg/dL 17 18 18   CREATININE mg/dL 0.78 0.66 0.78   GLUCOSE mg/dL 121* 106* 243*   CALCIUM mg/dL 9.0 8.2* 8.4*   ANION GAP mmol/L 18 15 18   EGFR mL/min/1.73m*2 >90 >90 >90   PHOSPHORUS mg/dL 2.8 3.8 5.0*     Results from last 72 hours   Lab Units 04/14/24  1417 04/14/24  0514 04/13/24  1633 04/13/24  0246 04/12/24  1237 04/12/24  0017   ALK PHOS U/L  --  324*  --  314*  --  340*   BILIRUBIN TOTAL mg/dL  --  2.1*  --  2.2*  --  2.2*   BILIRUBIN DIRECT mg/dL  --  0.9*  --  1.0*  --  1.1*   PROTEIN TOTAL g/dL  --  6.3*  --  6.5  --  6.8   ALT U/L  --  31  --  29  --  29   AST U/L  --  82*  --  68*  --  60*   ALBUMIN g/dL 4.4 4.0 4.0 4.1   < > 4.1  4.2    < > = values in this interval not displayed.     Estimated Creatinine Clearance: 100.9 mL/min (by C-G formula based on SCr of 0.78 mg/dL).  C-Reactive Protein   Date Value Ref Range Status   03/27/2024 4.27 (H) <1.00 mg/dL Final   02/15/2024 6.06 (H) <1.00 mg/dL Final     CRP   Date Value Ref Range Status   01/18/2023 0.68 mg/dL Final     Comment:     REF VALUE  < 1.00       Microbiology  Susceptibility data from last 14 days.  Collected Specimen Info Organism Ampicillin Ciprofloxacin Daptomycin Levofloxacin Linezolid Nitrofurantoin Penicillin Trimethoprim/Sulfamethoxazole Vancomycin   04/09/24 Urine from Straight Catheter Enterococcus faecium R R R R S S R R R   04/01/24 Fluid from SPUTUM Normal throat araceli                Assessment/Plan   Susceptibility data from last 14 days.  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 She presented to the ED with N/V in 2/2024 and was found with graft failure and cardiogenic shock. Treated for acute cellular versus antibody-mediated rejection with pulse steroids, IVIG/plasmapheresis x5, and 2 doses of thymoglobulin in 2-3/2024, but repeated biopsies were negative for significant rejection. Over this time she has had refractory cardiogenic shock requiring impella and ECMO support with course c/b ARF requiring RRT, ALI, hemolysis in setting of impella positioning, bilateral internal jugular DVTs, and coagulopathies.        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       ID were consulted for hypotension, leukocytosis, and AMS. Localizing symptoms for us have largely been epigastric abdominal pain, tachypnea, dry cough, and mild AMS.         VAP  Positive beta-D-glucan, probable false positive from IVIG  Heart failure s/p prior OHT and now awaiting re-listing for new OHT  4/13/24 New leukocytosis           Work-up most suggestive for VAP. Agree with stopping micafungin, and subsequently vancomycin given negative cultures and MRSA nares.           False positive BDGs are unfortunately common and may be due to IVIG (last given 3/18, can last for several weeks), dialysis filters, albumin products, and blood products, among other causes. She has had exposure to all of these recently. After further review this evening, given her exposures to common causes of BDG false positives, CT chest findings, and heavily immunocompromised state with likely substantial future immunosuppression, it may make most sense to obtain a PJP PCR of the sputum, as repeating a BDG in a week may still be falsely positive anyways for the reasons  above.     04/12/2024 Update:       Discussed case with patient and her mother and the primary team.  Clinical suspicion for PJP is low although patient is at high risk.  Also clinical suspicion lower given propensity for IVIG and other agents (see above) to cause false positive beta D glucan testing.  Ideally would do bronchoscopy to obtain deep respiratory sample for BAL viral PCR panel including pneumocystis.  Deep samples could also be examined by sputum/BAL cytology but this has been largely replaced by PCR testing on adequate sample.       Could do nasotracheal aspirate and send for PJP PCR designating the sample tracheal aspirate/fluid.  Would also send for BAL viral PCR panel to rule out other atypical pathogens (suspicion low).       Would not treat empirically for PJP at this time given paucity of evidence.  Also concerned that treatment for PCP even empiric would mandate 3-week induction followed by maintenance therapy.  Would not initiate empiric therapy unless committed to completing induction therapy followed by long-term maintenance therapy.  Also would need to consider risk for PJP exacerbation when on high-dose immunosuppression posttransplant.  Might be forced to treatment doses at that time which might pose additional risks in the peritransplant setting.          At this time I think we can try to obtain deep respiratory sample with suctioning or via expectoration or via  hypertonic induction (and at the present time avoid traumatic nasotracheal aspiration given history of epistaxis and bronchoscopy).       Also empiric treatment in this setting is difficult and would need to consider whether or not other broad-spectrum antifungal should be included.  Thus without deeper specimen or additional microbiologic or molecular diagnostic testing, would not empirically treat for PJP or other fungi (micafungin or Amphotericin)    4/14/2024 Update:       Clinically stable.  However WBC count has risen to  22,000 over 48 hours.  No new localizing signs or symptoms but will need repeat blood cultures, stool C. difficile PCR if patient has diarrhea.  Will also need updated lines.  No antibiotic change at this time    Recommendations:  1.  Sputum/tracheal aspirate from 4/13/24 pending for respiratory pathogen PCR   PJP PCR and BAL viral panel designating sample tracheal aspirate.              Will follow up on DNA/PCR testing but some of the tests will take 5-10 days as sent to outside reference lab  2.  Dr. Payton ordered routine sputum for culture - even if just saliva, will have microbiology culture define araceli in preparation for re-listing for transplant  3.  Continue current vancomycin, meropenem  4.  4/11/24 discontinued micafungin  5.  will advise on stopping vancomycin in upcoming days given MRSA nares and negative cultures otherwise  6.  Anticipate completing 7 to 10-day course of antibiotics for VAP.  Will advise  7.  Continue prophylaxis: Bactrim, valganciclovir 450 q48h  8.  Although beta D glucan may remain elevated from IVIG, would consider repeating serum beta D glucan on 4/15/24.    I spent 50 minutes in the professional and overall care of this patient.      Wilmer Payton MD

## 2024-04-15 NOTE — PROGRESS NOTES
Occupational Therapy    Occupational Therapy Treatment    Name: Charla Bowles  MRN: [id number]  : 1990  Date: 04/15/24  Room:       Time Calculation  Start Time: 1415  Stop Time: 1503  Time Calculation (min): 48 min    Assessment:  End of Session Communication: Bedside nurse  End of Session Patient Position: Bed, 3 rail up, Alarm off, not on at start of session  Plan:  Treatment Interventions: ADL retraining, Functional transfer training, Endurance training, UE strengthening/ROM, Cognitive reorientation, Patient/family training, Equipment evaluation/education, Neuromuscular reeducation, Fine motor coordination activities, Compensatory technique education  OT Frequency: 5 times per week  OT Discharge Recommendations: High intensity level of continued care  Equipment Recommended upon Discharge:  (TBD)  OT Recommended Transfer Status: Assist of 2  OT - OK to Discharge: Yes    Subjective   General:  OT Last Visit  OT Received On: 04/15/24  Co-Treatment: PT  Co-Treatment Reason: Pt on ECMO requiring skilled 2 person assist for safe mobility  Prior to Session Communication: Bedside nurse (PA)  Patient Position Received: Bed, 3 rail up, Alarm off, not on at start of session  General Comment: Pt supine in bed upon arrival, agreeable. On .4 precedex, heparin, R femoral VA ECMO 85% FiO2, sweep 5, flow 2/96 (2.44 post), R axillary impella (P-3) flow 1.5 (post 1.8), CVVH through ECMO. Noted to have ECMO suture (x1) detached on arrival. Examined by medical team and pt cleared to mobilize. Monitoring during and post mobility.     Precautions:  Hearing/Visual Limitations: WFL  Medical Precautions: Cardiac precautions, Fall precautions  Precautions Comment: R axillary impella precautions- no pushing/pulling with R UE, No lifting R UE above shoulder height, no R UE humeral EXT past plane of body, R femoral ECMO precautions, MAP 70-90, protective precautions    Vitals:  Vital Signs  Heart Rate: 107 (post:  110)  Resp: (!) 28 (max 52: post: 31)  SpO2: 100 % (post: 100)  BP: (!) 106/92 (post: 96/87)  MAP (mmHg): 99 (post: 90)    Lines/Tubes/Drains:  ECMO Cannulation Peripheral Right;Femoral artery;Femoral vein (Active)   Number of days: 18       Arterial Line 04/11/24 Right Radial (Active)   Number of days: 3       Ventricular Assist Device Impella 5.5 (Active)   Number of days: 44       Rectal Tube With balloon (Active)   Number of days: 5       Hemodialysis Cath 04/06/24 Triple lumen Right Non-tunneled catheter Jugular (Active)   Number of days: 9       Cognition:  Overall Cognitive Status: Within Functional Limits  Orientation Level: Oriented X4  Cognition Comments: reports feeling anxious this date, frustrated with situation. required multiple repetitions of activity restrictions  Insight: Mild  Processing Speed: Delayed    Pain Assessment:  Pain Assessment  Pain Assessment: 0-10  Pain Score: 0 - No pain     Objective   Activities of Daily Living:        Grooming  Grooming Comments: pt washed face supine in bed with set up assist          Bed Mobility/Transfers:     Bed Mobility  Bed Mobility: Yes  Bed Mobility 1  Bed Mobility 1: Supine to sitting, Sitting to supine  Level of Assistance 1: Dependent (x2)  Bed Mobility Comments 1: draw sheet, HOB elevated    Transfers  Transfer: Yes  Transfer 1  Transfer From 1: Sit to  Transfer to 1: Stand  Transfer Level of Assistance 1: Moderate assistance (x2)  Trials/Comments 1: x2 trials, draw sheet at hips        Therapy/Activity:   Therapeutic Exercise  Therapeutic Exercise Performed: Yes  Therapeutic Exercise Activity 1: 1x5 LUE shoulder flexion/extension AAROM  Therapeutic Activity  Therapeutic Activity Performed: Yes  Therapeutic Activity 1: Pt tolerated ~20 minutes seated EOB with SBA for safety  Therapeutic Activity 2: While seated EOB and with report of panic attack, pt tolerated brief lower body progressive muscle relaxation exercise\  Therapeutic Activity 3: Pt  performed x2 static standing trials. On trial 1: pt tolerated static standing x3 minutes with min A x2. On trial 2, pt tolerated 2 minutes static standing with min A x2.  Therapeutic Activity 4: Pt tolerated 1 set 10 breaths with oscillating positive expiratory pressure device seated EOB.      Outcome Measures:  [facility] Daily Activity  Putting on and taking off regular lower body clothing: Total  Bathing (including washing, rinsing, drying): A lot  Putting on and taking off regular upper body clothing: A lot  Toileting, which includes using toilet, bedpan or urinal: Total  Taking care of personal grooming such as brushing teeth: A little  Eating Meals: A little  Daily Activity - Total Score: 12     Education Documentation  Handouts, taught by Marcia Thomason OT at 4/15/2024  3:49 PM.  Learner: Patient  Readiness: Acceptance  Method: Explanation, Demonstration  Response: Verbalizes Understanding    Body Mechanics, taught by Marcia Thomason OT at 4/15/2024  3:49 PM.  Learner: Patient  Readiness: Acceptance  Method: Explanation, Demonstration  Response: Verbalizes Understanding    Precautions, taught by Marcia Thomason OT at 4/15/2024  3:49 PM.  Learner: Patient  Readiness: Acceptance  Method: Explanation, Demonstration  Response: Verbalizes Understanding    Home Exercise Program, taught by Marcia Thomason OT at 4/15/2024  3:49 PM.  Learner: Patient  Readiness: Acceptance  Method: Explanation, Demonstration  Response: Verbalizes Understanding    ADL Training, taught by Marcia Thomason OT at 4/15/2024  3:49 PM.  Learner: Patient  Readiness: Acceptance  Method: Explanation, Demonstration  Response: Verbalizes Understanding    Education Comments  No comments found.        Goals:  Encounter Problems       Encounter Problems (Active)       ADLs       Patient will complete daily grooming tasks in standing with LRAD with supervision level of assistance and PRN adaptive  equipment. (Progressing)       Start:  03/01/24    Expected End:  04/16/24               ADLs       Patient with complete lower body dressing with stand by assist level of assistance donning and doffing all LE clothes  with PRN adaptive equipment (Not met)       Start:  03/04/24    Expected End:  03/18/24    Resolved:  04/02/24    Updated to: Patient with complete UB bathing with stand by assist level of assistance  with PRN adaptive equipment    Update reason: re eval          Patient will complete toileting including hygiene clothing management/hygiene with stand by assist level of assistance. (Not met)       Start:  03/04/24    Expected End:  03/18/24    Resolved:  04/02/24    Updated to: Patient will complete upper body dressing including with mod I level of assistance.    Update reason: re eval         Patient with complete UB bathing with stand by assist level of assistance  with PRN adaptive equipment (Progressing)       Start:  04/02/24    Expected End:  04/16/24                Patient will complete upper body dressing including with mod I level of assistance. (Progressing)       Start:  04/02/24    Expected End:  04/16/24                   BALANCE       Pt will increase dynamic standing tolerance to >10 min with supervision using LRAD during functional mobility/ADLs without LOB in order to improve activity tolerance and balance for self-care tasks.  (Progressing)       Start:  03/01/24    Expected End:  04/16/24               COGNITION/SAFETY       Patient will participate in cognitive activities to demonstrate WFL score on further cognitive assessments, including Medi-Cog, MoCA and remain A&O x3, CAM (-).  (Progressing)       Start:  03/01/24    Expected End:  04/16/24               EXERCISE/STRENGTHENING       Patient will be educated on BUE HEP for increased ADL/IADL performance. (Progressing)       Start:  03/01/24    Expected End:  04/16/24               MOBILITY       Patient will perform Functional  mobility max Household distances/Community Distances with supervision level of assistance and least restrictive device in order to improve safety and functional mobility. (Progressing)       Start:  03/01/24    Expected End:  04/16/24                     04/15/24 at 3:50 PM   Marcia Thomason, OT   [phone]

## 2024-04-15 NOTE — PROGRESS NOTES
"        INPATIENT TRANSPLANT NEPHROLOGY PROGRESS NOTE          REASON FOR CONSULT: CRRT mgt    [facility] Procedure note    Tolerating 300 ml/h UFR. [facility] run sheets reviewed.    PHYSICAL EXAMINATION:    Visit Vitals  BP 99/87 (BP Location: Right arm, Patient Position: Lying) Comment: Post: 95/84   Pulse 79   Temp 36.7 °C (98.1 °F) (Temporal)   Resp 25   Ht 1.549 m (5' 0.98\")   Wt 84 kg (185 lb 3 oz)   SpO2 95%   BMI 35.01 kg/m²   OB Status Hysterectomy   Smoking Status Never   BSA 1.9 m²        04/13 0700 - 04/14 1859  In: 4690 [P.O.:1940; I.V.:540]  Out: 5561          Pulmonary:      Effort: Pulmonary effort is normal. No respiratory distress.      Breath sounds: Normal breath sounds.   Chest:      Comments: Right axillary impella site CDI   Abdominal:      General: Bowel sounds are normal.      Palpations: Abdomen is soft.      Tenderness: There is no abdominal tenderness.   Musculoskeletal:      -No LE edema     General: Skin is warm and dry.      Comments: Left groin ECMO cannulation site with drainage    Neurological:   A&OX3    MEDICATION LIST: REVIEWED    acetaminophen, 650 mg, q6h  calcium carbonate-vitamin D3, 1 tablet, Daily  cyanocobalamin, 500 mcg, Daily  darbepoetin floyd, 100 mcg, q14 days  ferrous sulfate (325 mg ferrous sulfate), 65 mg of iron, Daily with breakfast  folic acid, 1 mg, Daily  heparin (porcine), 5,000 Units, q8h  insulin lispro, 0-15 Units, q4h  levothyroxine, 250 mcg, Daily  lidocaine, 1 patch, Daily  lidocaine, 1 patch, Daily  melatonin, 10 mg, Daily  meropenem, 2 g, q12h  multivitamin with minerals, 1 tablet, Daily  nystatin, 5 mL, q6h  oxygen, , Continuous - Inhalation  pantoprazole, 40 mg, Daily  perflutren lipid microspheres, 0.5-10 mL of dilution, Once in imaging  perflutren protein A microsphere, 0.5 mL, Once in imaging  polyethylene glycol, 17 g, Daily  predniSONE, 10 mg, Daily  QUEtiapine, 50 mg, Nightly  [Held by provider] rosuvastatin, 10 mg, Nightly  sennosides-docusate sodium, 2 " tablet, BID  [Held by provider] sod phos di, mono-K phos mono, 500 mg, 4x daily  sulfamethoxazole-trimethoprim, 80 mg of trimethoprim, Daily  sulfur hexafluoride microsphr, 2 mL, Once in imaging  valGANciclovir, 450 mg, q48h  vancomycin, 750 mg, q12h      [Held by provider] dexmedeTOMIDine, Last Rate: Stopped (04/11/24 0630)  heparin Impella Purge 25 units/mL in 500 mL D5W, Last Rate: 10 mL/hr (04/14/24 2100)  lactated Ringer's, Last Rate: 5 mL/hr (04/14/24 2100)  PrismaSol 4/2.5, Last Rate: 2,400 mL/hr (04/13/24 1700)      alteplase, 2 mg, PRN  bisacodyl, 10 mg, Daily PRN  calcium gluconate, 1 g, q6h PRN  calcium gluconate, 2 g, q6h PRN  dextrose, 25 g, q15 min PRN  dextrose, 25 g, q15 min PRN   Or  glucagon, 1 mg, q15 min PRN  dextrose, 25 g, q15 min PRN   Or  glucagon, 1 mg, q15 min PRN  hydrALAZINE, 5 mg, q4h PRN  hydrOXYzine HCL, 25 mg, q6h PRN  ipratropium-albuteroL, 3 mL, q6h PRN  artificial tears, 2 drop, PRN  magnesium sulfate, 2 g, q6h PRN  magnesium sulfate, 4 g, q6h PRN  microfibrllar collagen, , PRN  mupirocin, , BID PRN  naloxone, 0.2 mg, q5 min PRN  ondansetron, 4 mg, q4h PRN  oxyCODONE, 5 mg, q6h PRN  sodium chloride, 1 spray, 4x daily PRN  vancomycin, , Daily PRN        ALLERGY:  Allergies   Allergen Reactions    Dapagliflozin GI bleeding and Bleeding     UTI    Urinary tract infection    Empagliflozin Unknown    Myfortic [Mycophenolate Sodium] GI Upset    Topiramate Nausea Only and Nausea/vomiting    Latex Rash       LABS:  Results for orders placed or performed during the hospital encounter of 02/15/24 (from the past 24 hour(s))   POCT GLUCOSE   Result Value Ref Range    POCT Glucose 215 (H) 74 - 99 mg/dL   Reticulocytes   Result Value Ref Range    Retic % 5.9 (H) 0.5 - 2.0 %    Retic Absolute 0.135 (H) 0.018 - 0.083 x10*6/uL    Reticulocyte Hemoglobin 34 28 - 38 pg    Immature Retic fraction 36.2 (H) <=16.0 %   Haptoglobin   Result Value Ref Range    Haptoglobin <10 (L) 37 - 246 mg/dL   CBC and  Auto Differential   Result Value Ref Range    WBC 18.6 (H) 4.4 - 11.3 x10*3/uL    nRBC 8.4 (H) 0.0 - 0.0 /100 WBCs    RBC 2.28 (L) 4.00 - 5.20 x10*6/uL    Hemoglobin 6.8 (L) 12.0 - 16.0 g/dL    Hematocrit 21.4 (L) 36.0 - 46.0 %    MCV 94 80 - 100 fL    MCH 29.8 26.0 - 34.0 pg    MCHC 31.8 (L) 32.0 - 36.0 g/dL    RDW 21.2 (H) 11.5 - 14.5 %    Platelets 99 (L) 150 - 450 x10*3/uL    Immature Granulocytes %, Automated 11.6 (H) 0.0 - 0.9 %    Immature Granulocytes Absolute, Automated 2.15 (H) 0.00 - 0.70 x10*3/uL   Lactate Dehydrogenase   Result Value Ref Range    LDH 1,531 (H) 84 - 246 U/L   Hepatic function panel   Result Value Ref Range    Albumin 4.0 3.4 - 5.0 g/dL    Bilirubin, Total 2.1 (H) 0.0 - 1.2 mg/dL    Bilirubin, Direct 0.9 (H) 0.0 - 0.3 mg/dL    Alkaline Phosphatase 324 (H) 33 - 110 U/L    ALT 31 7 - 45 U/L    AST 82 (H) 9 - 39 U/L    Total Protein 6.3 (L) 6.4 - 8.2 g/dL   Vancomycin, Trough   Result Value Ref Range    Vancomycin, Trough 13.9 5.0 - 20.0 ug/mL   Calcium, ionized   Result Value Ref Range    POCT Calcium, Ionized 1.07 (L) 1.1 - 1.33 mmol/L   Magnesium   Result Value Ref Range    Magnesium 2.61 (H) 1.60 - 2.40 mg/dL   Heparin Assay, UFH   Result Value Ref Range    Heparin Unfractionated 0.3 See Comment Below for Therapeutic Ranges IU/mL   Basic Metabolic Panel   Result Value Ref Range    Glucose 106 (H) 74 - 99 mg/dL    Sodium 137 136 - 145 mmol/L    Potassium 3.9 3.5 - 5.3 mmol/L    Chloride 98 98 - 107 mmol/L    Bicarbonate 28 21 - 32 mmol/L    Anion Gap 15 10 - 20 mmol/L    Urea Nitrogen 18 6 - 23 mg/dL    Creatinine 0.66 0.50 - 1.05 mg/dL    eGFR >90 >60 mL/min/1.73m*2    Calcium 8.2 (L) 8.6 - 10.6 mg/dL   Phosphorus   Result Value Ref Range    Phosphorus 3.8 2.5 - 4.9 mg/dL   Manual Differential   Result Value Ref Range    Neutrophils %, Manual 92.3 40.0 - 80.0 %    Lymphocytes %, Manual 1.7 13.0 - 44.0 %    Monocytes %, Manual 3.4 2.0 - 10.0 %    Eosinophils %, Manual 0.9 0.0 - 6.0 %     Basophils %, Manual 0.0 0.0 - 2.0 %    Myelocytes %, Manual 1.7 0.0 - 0.0 %    Seg Neutrophils Absolute, Manual 17.17 (H) 1.20 - 7.00 x10*3/uL    Lymphocytes Absolute, Manual 0.32 (L) 1.20 - 4.80 x10*3/uL    Monocytes Absolute, Manual 0.63 0.10 - 1.00 x10*3/uL    Eosinophils Absolute, Manual 0.17 0.00 - 0.70 x10*3/uL    Basophils Absolute, Manual 0.00 0.00 - 0.10 x10*3/uL    Myelocytes Absolute, Manual 0.32 0.00 - 0.00 x10*3/uL    Total Cells Counted 117     RBC Morphology See Below     Polychromasia Mild     Hypochromia Mild     RBC Fragments Few    PST Top   Result Value Ref Range    Extra Tube Hold for add-ons.    Blood Gas Arterial Full Panel   Result Value Ref Range    POCT pH, Arterial 7.42 7.38 - 7.42 pH    POCT pCO2, Arterial 43 (H) 38 - 42 mm Hg    POCT pO2, Arterial 209 (H) 85 - 95 mm Hg    POCT SO2, Arterial 100 94 - 100 %    POCT Oxy Hemoglobin, Arterial 96.5 94.0 - 98.0 %    POCT Hematocrit Calculated, Arterial 22.0 (L) 36.0 - 46.0 %    POCT Sodium, Arterial 135 (L) 136 - 145 mmol/L    POCT Potassium, Arterial 4.2 3.5 - 5.3 mmol/L    POCT Chloride, Arterial 102 98 - 107 mmol/L    POCT Ionized Calcium, Arterial 1.12 1.10 - 1.33 mmol/L    POCT Glucose, Arterial 113 (H) 74 - 99 mg/dL    POCT Lactate, Arterial 1.4 0.4 - 2.0 mmol/L    POCT Base Excess, Arterial 3.1 (H) -2.0 - 3.0 mmol/L    POCT HCO3 Calculated, Arterial 27.9 (H) 22.0 - 26.0 mmol/L    POCT Hemoglobin, Arterial 7.2 (L) 12.0 - 16.0 g/dL    POCT Anion Gap, Arterial 9 (L) 10 - 25 mmo/L    Patient Temperature 37.0 degrees Celsius    FiO2 21 %   ECMO ARTERIAL FULL PANEL   Result Value Ref Range    POCT pH, Arterial ECMO 7.40 Reference range not established pH    POCT pCO2, Arterial ECMO 44 Reference range not established mm Hg    POCT pO2,  Arterial ECMO 186 Reference range not established mm Hg    POCT SO2, Arterial ECMO 100 Reference range not established %    POCT Oxy Hemoglobin, Arterial ECMO 95.0 Reference range not established %    POCT  Hematocrit Calculated, Arterial ECMO 22.0 (L) 36.0 - 46.0 %    POCT Sodium, Arterial  ECMO 135 (L) 136 - 145 mmol/L    POCT Potassium, Arterial  ECMO 3.9 3.5 - 5.3 mmol/L    POCT Chloride, Arterial  ECMO 103 98 - 107 mmol/L    POCT Ionized Calcium, Arterial  ECMO 1.09 (L) 1.10 - 1.33 mmol/L    POCT Glucose, Arterial  ECMO 98 74 - 99 mg/dL    POCT Lactate Arterial  ECMO 1.3 0.4 - 2.0 mmol/L    POCT Base Excess, Arterial ECMO 2.2 Reference range not established mmol/L    POCT HCO3 Calculated, Arterial ECMO 27.3 Reference range not established mmol/L    POCT Hemoglobin, Arterial  ECMO 7.2 (L) 12.0 - 16.0 g/dL    POCT Anion Gap, Arterial  ECMO 9 Reference range not established mmo/L    Patient Temperature 37.0 degrees Celsius    FiO2 85 %   ECMO VENOUS FULL PANEL   Result Value Ref Range    POCT pH, Venous ECMO 7.34 Reference range not established pH    POCT pCO2, Venous ECMO 54 Reference range not established mm Hg    POCT pO2,  Venous  ECMO 26 Reference range not established mm Hg    POCT SO2, Venous ECMO 41 Reference range not established %    POCT Oxy Hemoglobin, Venous ECMO 39.5 Reference range not established %    POCT Hematocrit Calculated, Venous ECMO 21.0 (L) 36.0 - 46.0 %    POCT Sodium, Venous ECMO 137 136 - 145 mmol/L    POCT Potassium, Venous ECMO 4.2 3.5 - 5.3 mmol/L    POCT Chloride, Venous ECMO 101 98 - 107 mmol/L    POCT Ionized Calcium, Venous ECMO 1.14 1.10 - 1.33 mmol/L    POCT Glucose, Venous ECMO 106 (H) 74 - 99 mg/dL    POCT Lactate Venous ECMO 1.2 0.4 - 2.0 mmol/L    POCT Base Excess, Venous ECMO 2.9 Reference range not established mmol/L    POCT HCO3 Calculated, Venous ECMO 29.1 Reference range not established mmol/L    POCT Hemoglobin, Venous ECMO 6.9 (L) 12.0 - 16.0 g/dL    POCT Anion Gap, Venous ECMO 11 Reference range not established mmo/L    Patient Temperature 37.0 degrees Celsius    FiO2 85 %   POCT GLUCOSE   Result Value Ref Range    POCT Glucose 79 74 - 99 mg/dL   Type and Screen    Result Value Ref Range    ABO TYPE O     Rh TYPE POS     ANTIBODY SCREEN NEG    POCT GLUCOSE   Result Value Ref Range    POCT Glucose 143 (H) 74 - 99 mg/dL   POCT GLUCOSE   Result Value Ref Range    POCT Glucose 127 (H) 74 - 99 mg/dL   CBC   Result Value Ref Range    WBC 22.5 (H) 4.4 - 11.3 x10*3/uL    nRBC 10.3 (H) 0.0 - 0.0 /100 WBCs    RBC 2.40 (L) 4.00 - 5.20 x10*6/uL    Hemoglobin 7.0 (L) 12.0 - 16.0 g/dL    Hematocrit 22.6 (L) 36.0 - 46.0 %    MCV 94 80 - 100 fL    MCH 29.2 26.0 - 34.0 pg    MCHC 31.0 (L) 32.0 - 36.0 g/dL    RDW 21.5 (H) 11.5 - 14.5 %    Platelets 136 (L) 150 - 450 x10*3/uL   Renal Function Panel   Result Value Ref Range    Glucose 121 (H) 74 - 99 mg/dL    Sodium 136 136 - 145 mmol/L    Potassium 4.1 3.5 - 5.3 mmol/L    Chloride 96 (L) 98 - 107 mmol/L    Bicarbonate 26 21 - 32 mmol/L    Anion Gap 18 10 - 20 mmol/L    Urea Nitrogen 17 6 - 23 mg/dL    Creatinine 0.78 0.50 - 1.05 mg/dL    eGFR >90 >60 mL/min/1.73m*2    Calcium 9.0 8.6 - 10.6 mg/dL    Phosphorus 2.8 2.5 - 4.9 mg/dL    Albumin 4.4 3.4 - 5.0 g/dL   Calcium, ionized   Result Value Ref Range    POCT Calcium, Ionized 1.07 (L) 1.1 - 1.33 mmol/L   Magnesium   Result Value Ref Range    Magnesium 2.81 (H) 1.60 - 2.40 mg/dL   Lactate dehydrogenase   Result Value Ref Range    LDH 1,879 (H) 84 - 246 U/L   Coagulation Screen   Result Value Ref Range    Protime 13.8 (H) 9.8 - 12.8 seconds    INR 1.2 (H) 0.9 - 1.1    aPTT 38 27 - 38 seconds   POCT GLUCOSE   Result Value Ref Range    POCT Glucose 125 (H) 74 - 99 mg/dL   POCT GLUCOSE   Result Value Ref Range    POCT Glucose 130 (H) 74 - 99 mg/dL     *Note: Due to a large number of results and/or encounters for the requested time period, some results have not been displayed. A complete set of results can be found in Results Review.        ASSESSMENT AND PLAN:    Ms. Yimi Bowles is a 34F with a PMHx sig for stage D HFrEF (PPCM) s/p ICD s/p CardioMEMs, hypothyroidism 2/2 thyroidectomy, obesity s/p  gastric bypass (2017), and SLE who is s/p OHT (3/31/2022) with a post-OHT course complicated by RIJ/RUE DVTs, leukopenia, left groin seroma, and asymptomatic 2R ACR (11/2022) treated with steroids, s/p total hysterectomy (2023), Flu A (1/2024).  Who initially presented on 2/15/2024 in the setting of acute systolic heart failure with the graft failure and cardiogenic shock.  Patient currently is on [facility] ECMO support and Impella with ongoing acute renal failure requiring CRRT.      CRRT Management Note:  #HIRAM-D, 2/2 ATN in setting of cardiogenic shock. Started on CRRT initially 2/19 and transitioned to iHD however unable to achieve optimal fluid management so transitioned back to CRRT   - Hemodynamically stable on ECMO and Impella.   - tolerating UFR per primary team.   -monitor and replete lytes as indicated.   -Of note patient is currently status 7 for heart kidney transplant listed  on 4/2/2024.  Secondary to sepsis, pneumonia.

## 2024-04-15 NOTE — PROGRESS NOTES
Vancomycin Dosing by Pharmacy- Cessation of Therapy    Consult to pharmacy for vancomycin dosing has been discontinued by the prescriber, pharmacy will sign off at this time.    Please call pharmacy if there are further questions or re-enter a consult if vancomycin is resumed.     Kuldip Trammell, HodaD

## 2024-04-15 NOTE — PROGRESS NOTES
CTICU Progress Note  Charla Bowles/[id number]    Admit Date: 2/15/2024  Hospital Length of Stay: 60   ICU Length of Stay: 18d 12h   CT SURGEON: Dr. iWtt    SUBJECTIVE:   Increasing leukocytosis    MEDICATIONS  Infusions:  [Held by provider] dexmedeTOMIDine, Last Rate: Stopped (04/11/24 0630)  heparin Impella Purge 25 units/mL in 500 mL D5W, Last Rate: 10 mL/hr (04/15/24 0800)  lactated Ringer's, Last Rate: 5 mL/hr (04/15/24 0800)  PrismaSol 4/2.5, Last Rate: 2,400 mL/hr (04/13/24 1700)      Scheduled:  acetaminophen, 650 mg, q6h  calcium carbonate-vitamin D3, 1 tablet, Daily  cyanocobalamin, 500 mcg, Daily  darbepoetin floyd, 100 mcg, q14 days  ferrous sulfate (325 mg ferrous sulfate), 65 mg of iron, Daily with breakfast  folic acid, 1 mg, Daily  heparin (porcine), 5,000 Units, q8h  insulin lispro, 0-15 Units, q4h  levothyroxine, 250 mcg, Daily  lidocaine, 1 patch, Daily  lidocaine, 1 patch, Daily  melatonin, 10 mg, Daily  meropenem, 2 g, q12h  multivitamin with minerals, 1 tablet, Daily  nystatin, 5 mL, q6h  oxygen, , Continuous - Inhalation  pantoprazole, 40 mg, Daily  perflutren lipid microspheres, 0.5-10 mL of dilution, Once in imaging  perflutren protein A microsphere, 0.5 mL, Once in imaging  polyethylene glycol, 17 g, Daily  predniSONE, 10 mg, Daily  QUEtiapine, 50 mg, Nightly  [Held by provider] rosuvastatin, 10 mg, Nightly  sennosides-docusate sodium, 2 tablet, BID  [Held by provider] sod phos di, mono-K phos mono, 500 mg, 4x daily  sulfamethoxazole-trimethoprim, 80 mg of trimethoprim, Daily  sulfur hexafluoride microsphr, 2 mL, Once in imaging  valGANciclovir, 450 mg, q48h  vancomycin, 750 mg, q12h      PRN:  alteplase, 2 mg, PRN  bisacodyl, 10 mg, Daily PRN  calcium gluconate, 1 g, q6h PRN  calcium gluconate, 2 g, q6h PRN  dextrose, 25 g, q15 min PRN  dextrose, 25 g, q15 min PRN   Or  glucagon, 1 mg, q15 min PRN  dextrose, 25 g, q15 min PRN   Or  glucagon, 1 mg, q15 min PRN  hydrALAZINE, 5 mg,  "q4h PRN  hydrOXYzine HCL, 25 mg, q6h PRN  ipratropium-albuteroL, 3 mL, q6h PRN  artificial tears, 2 drop, PRN  magnesium sulfate, 2 g, q6h PRN  magnesium sulfate, 4 g, q6h PRN  microfibrllar collagen, , PRN  mupirocin, , BID PRN  naloxone, 0.2 mg, q5 min PRN  ondansetron, 4 mg, q4h PRN  oxyCODONE, 5 mg, q6h PRN  sodium chloride, 1 spray, 4x daily PRN  vancomycin, , Daily PRN        PHYSICAL EXAM:   Visit Vitals  BP 98/57   Pulse (!) 112   Temp 36.7 °C (98.1 °F) (Temporal)   Resp (!) 41   Ht 1.549 m (5' 0.98\")   Wt 84 kg (185 lb 3 oz)   SpO2 97%   BMI 35.01 kg/m²   OB Status Hysterectomy   Smoking Status Never   BSA 1.9 m²     Wt Readings from Last 5 Encounters:   04/06/24 84 kg (185 lb 3 oz)   12/07/23 92.1 kg (203 lb)   12/01/23 93 kg (205 lb)   11/29/23 92.9 kg (204 lb 12.8 oz)   11/09/23 91.3 kg (201 lb 3.2 oz)     INTAKE/OUTPUT:  I/O last 3 completed shifts:  In: 4385 (52.2 mL/kg) [P.O.:1660; I.V.:705 (8.4 mL/kg); Blood:350; NG/GT:970; IV Piggyback:700]  Out: 6589 (78.4 mL/kg) [Other:6139; Stool:450]  Weight: 84 kg        LDA:  ECMO Cannulation Peripheral Right;Femoral artery;Femoral vein (Active)   Placement Date/Time: 03/27/24 1700   ECMO Type: Peripheral  Cannulation Site: Right;Femoral artery;Femoral vein  Line Securement: Line secured in 2 locations;Securement device;Sutures   Number of days: 10       Arterial Line 03/27/24 Right Radial (Active)   Placement Date/Time: 03/27/24 1545   Orientation: Right  Location: Radial   Number of days: 10       Ventricular Assist Device Impella 5.5 (Active)   Placement Date/Time: 03/01/24 1956   Placed by: Dr Viramontes  Hand Hygiene Completed: Yes  Site Location: Axilla right  VAD Type: Left ventricular assist device  VAD Brand: Impella 5.5   Number of days: 36       Hemodialysis Cath 04/06/24 Triple lumen Right Non-tunneled catheter Jugular (Active)   Placement Date/Time: 04/06/24 1500   Hand Hygiene Completed: Yes  Site Prep: Usual sterile procedure followed  Site Prep " Agent has Completely Dried Before Insertion: Yes  All 5 Sterile Barriers Used (Gloves, Gown, Cap, Mask, Large Sterile Drape): Yes ...   Number of days: 0     Physical Exam:   - CONSTITUTION: Critically ill on MCS  - NEUROLOGIC: A+O and CAM neg today, generally less confused. following in all 4 extremities. Physically deconditioned  - CARDIOVASCULAR: ST, R fem VA ECMO, no bleeding at site. R axillary impella, no bleeding at site. No s/s infection at either site. +distal signals in bilateral lower extremities  - RESPIRATORY: Diminished bilateral lung sounds, symmetric chest expansion  - GI: Abdomen soft, non tender, non distended, +bowel sounds.  Diarrhea, FMS in place  - : Anuric, on CVVH via ECMO circuit  - EXTREMITIES: Warm and dry, no peripheral edema  - SKIN: Left femoral skin breakdown with dressing in place  - PSYCHIATRIC: Calm     Images: CXR c/f  pulmonary edema persisting but improving    Invasive Hemodynamics:    Most Recent Range Past 24hrs   BP (Art) 107/94 Arterial Line BP 1  Min: 89/78  Max: 110/97   MAP(Art) 100 mmHg Arterial Line MAP 1 (mmHg)   Min: 79 mmHg  Max: 102 mmHg   RA/CVP   No data recorded   PA 41/34 No data recorded   PA(mean) 37 mmHg No data recorded   CO 5.5 L/min No data recorded   CI 2.8 L/min/m2 No data recorded   Mixed Venous 43.8 % SVO2 (%)  Min: 36.2 %  Max: 43.8 %   SVR  1115 (dyne*sec)/cm5 No data recorded     ECMO:     Most Recent Range Past 24hrs   Flow 2.93 Patient Flow (L/min)  Min: 2.7  Max: 3.03   Speed 3975 Circuit Flow (RPM)  Min: 2974  Max: 3978   Sweep 5 Sweep Gas Flow Rate (L/min)  Min: 5  Max: 5      Impella:      Most Recent Range Past 24hrs   Performance Level 3 P Level  Min: 3   Min taken time: 04/15/24 0700  Max: 3   Max taken time: 04/15/24 0700   Flow (L/min) 1.4 Flow (L/min)  Min: 1.1   Min taken time: 04/14/24 0900  Max: 1.5   Max taken time: 04/15/24 0500   Motor Current 178/119 Motor Current  Min: 93/79   Min taken time: 04/14/24 1600  Max: 181/120   Max  taken time: 04/15/24 0100   Placement Signal No  Placement OK could not be evaluated. This SmartLink does not work with rows of the type: Custom List   Purge (mmHg) 512 Purge Pressure (mmHg)  Min: 392   Min taken time: 04/14/24 1100  Max: 576   Max taken time: 04/15/24 0400   Purge rate (mL/hr) 11 Purge Rate (mL/hr)  Min: 10   Min taken time: 04/15/24 0100  Max: 11   Max taken time: 04/15/24 0700        Daily Risk Screen:  Dialysis/Hemapheresis    Assessment/Plan   34F with a PMHx sig for stage D HFrEF (PPCM) s/p ICD s/p CardioMEMs, hypothyroidism 2/2 thyroidectomy, obesity s/p gastric bypass (2017), and SLE who is s/p OHT (3/31/2022) with a post-OHT course complicated by RIJ/RUE DVTs, leukopenia, left groin seroma, and asymptomatic 2R ACR (11/2022) treated with steroids, s/p total hysterectomy (2023), Flu A (1/2024).     She presented to [facility] ED on 2/15/24 with complaints of N/V x 3 days and inability to keep medications down. Found to have acute systolic heart failure with primary graft failure and cardiogenic shock. Treated for suspected acute cellular vs. acute antibody mediated rejection; however, multiple cardiac biopsies negative for signs of rejection or other causes of graft failure. Her course has been complicated by multiple MCS devices for refractory cardiogenic shock (right femoral IABP: 2/18/24 - 3/1/24, Left femoral VA ECMO: 2/19/24 - 2/29/24, Right axillary impella 5.5: 3/1/24 - remains in place, 2nd right femoral VA ECMO: 3/27/24 - remains in place), ARF requiring RRT, acute liver injury, intubation due to volume overload in setting of pulmonary edema, severe hemolysis 2/2 to impella malposition, mild CMV viremia, bilateral provoked IJ DVTs, multiple episodes of epistaxis & coagulopathies requiring ongoing blood product transfusions.        Charla was transferred to CTICU on 3/27/24 following right femoral VA ECMO placement due to worsening cardiogenic shock and multi-organ failure despite Impella  5.5 support and multiple inotropes. She was listed Status 1 for heart/kidney on 4/2, however was deactivated (status 7) due to c/f sepsis.  She remains critically ill in the CTICU on MCS with ECMO and an Impella as well as CVVH.       NEURO:  She vacillates between being neuro intact with intermittent delirium and anxiety though is much improved over last few days. Insomnia supported with multimodals and REST protocol.  CAM negative this AM.  Sitting at side of bed and standing with PT- max assist to get into position but then min assist to maintain.-->  - Serial neuro and pain assessments  - Decrease tylenol to PRN from scheduled  - Continue lidoderm patch for back pain  - PRN oxy 5mg Q6   - Continue melatonin 10 mg HS   - Continue Seroquel 50 mg HS (Qtc 490 4/14)    - May use precedex at bedtime for sleep if needed  - PRN hydroxyzine  - PT/OT Consult; mobilize as able  - CAM ICU score qshift. Sleep/wake cycle hygiene  - REST protocol (CXR and labs after 6am)      ENT: Multiple episodes of epistaxis with interventions by ENT. Most recently in OR 3/27 with L nare packed. Packing removed 4/1. -->  - appreciate ENT recs  - PRN ocean spray and mupiricin for dry nasal passages  - PRN afrin      Cardiac: Patient has a history of heart transplant in March of 2022 with primary graft dysfunction treated for acute cellular and antibody rejection without improvement with negative biopsy with multiple MCS devices for refractory cardiogenic shock (right femoral IABP: 2/18/24 - 3/1/24; Left femoral VA ECMO: 2/19/24 - 2/29/24; Right axillary impella 5.5: 3/1/24 - ; 2nd right femoral VA ECMO: 3/27/24 - ). Elevated LDH and difficulty with flows despite multiple attempts at repositioning Impella previously.   Current ECMO settings ~3L with FiO2 85% and sweep 5; Impella 5.5 P3 with flows 1.2 LPM Impella dropped flows and alarming in aorta this am. LDH increasing. Remains sinus tachy and normotensive. -->  - Maintain goal MAP 70-90  -  Continue full BiV support with ECMO  - Reduce impella to P2 from P3 with increased LDH, hemolysis  - Echo today to check impella placement, pending final read  - Holding rosuvastatin with mild uptrending in LFT  - Trend daily LDH   - Hold home amlodipine     Vascular: 3/27 arterial duplex with proximal SFA occlusion with collateral flow. C/f LLE ischemia resolved. Feet warm with intact motor exam. -->     PULM: She has been intubated multiple times throughout hospital course for procedures. Acute respiratory failure persisting due to acute pulmonary edema and CT chest 4/9 with severe multifocal PNA. Cxr improving with some cardiogenic pulmonary edema. Gas exchange augmented by VA ECMO, occasionally on NC for comfort.  -->  - CXR daily  - Adjust sweep for pH 7.3 - 7.5  - Maintain SPO2 > 92%     GI: History of gastric bypass and MMF colitis. Shock liver originally resolved; most recently elevated r/t likely congestive hepatopathy that is improving on ECMO. Abdominal pain improved with reduced myfortic dosing. Previous c/f GI bleed, likely blood from epistaxis; no current evidence of GI bleeding. H pylori negative, fecal calprotectin (elevated at 380), fecal lactoferrin (Positive 03/23/24). Poor nutritional intake leading to vitamin K deficiency and elevated INR s/p Dobhoff placement by ENT 4/8 under fiberoptic visualization.  Concern for aspiration in setting of PNA, SLP eval 4/10 was concerning for coughing with solids and they recommended sips & chips only. TF held for vomiting yesterday. Passed swallow today. Despite education on benefits of maintaining, NG tube removed per patient request.  -->  - Continue calcitriol 0.5mg daily, multivitamin, calcium carbonate 1,250mg BID  - Continue PPI daily  - Continue regular diet   - Continue Ensure Clear TID  - Continue holding bowel regimen     :  No history, baseline Cr 1.2-1.3. HIRAM originally on CVVH then with renal recovery but then again worsened and now dialysis  dependent and failed diuretic challenges. Hypervolemia improved but continued pulmonary edema. Mild hypophosphatemia.  -->  - RFP as clinically indicated, replete electrolytes per CTICU protocol.   - Continue CVVH with goal even to negative 500 cc  - Hold continuous sodium phos repletion  - Nephrology Following     ENDO: History of hypothyroidism and prediabetes. TSH elevated originally to malabsorption then with dosing adjusted by endo; TSH (3/17): 20.74, T4 1.11, T3: 1.4, improved 4/1; TSH 10.13, T4 0.70. 4/8: TSH 19.48, T3 0.8, T4 0.68. Steroid induced hyperglycemia acceptable glycemic control on SSI. -->  - Maintain BG <180 with hypoglycemia protocol  - Continue SSI #1 AC/HS   - Continue Synthroid 250mcg daily  - Recheck TFT's 4/16 (ordered)   - Appreciate Endocrine Consult      HEME: History of iron deficiency anemia and remote DVT; again with acute DVT LIJ and SVT left cephalic vein and right cephalic vein. Acute blood loss anemia with repeated transfusions with significant hemolysis but no evidence of active bleeding; last received RBC 4/5. Stable thrombocytopenia persisting; last PF4 negative 3/30. No recent transfusion requirement. Coagulopathy r/t likely poor nutrition resolved with vit K x 3 doses (last 4/10). Heparin assay previously therapeutic on SQH and impella purge, subtherapeutic today -->  - Continue ferrous sulfate daily  - Avoid unnecessarily transfusing, HGB > 7.0  - Restart systemic heparin with subtherapeutic heparin assay and aptt  - Continue heparin purge in the Impella  - Trend coags daily  - SCDs for DVT prophylaxis  - Aranesp every 2 weeks  - Last type and screen: 4/14     Rejection/prophylaxis: S/p heart transplant 3/31/2022. Induction basiliximab. Donor HCV -, toxo -/-, CMV -/+. Mild ACR with +CD4 and negative HLAs however clinical presentation concern for acute cellular vs AMR rejection/stuttering rejection completed courses of thymo/PLEX/IVIG without clinical improvement.  With  consideration to progressive graft failure and concern for infection limiting re-transplant at this time, we are holding tacro and myfortic (4/9 - ).  - Steroids: Continue PO prednisone 10 mg daily  - Hold Tacrolimus: 4.0 mg AM/3.5 mg PM w/ daily levels drawn @ 0600  - Hold Tacrolimus goal troughs: 8-10  - Hold Myfortic acid: 360mg BID (Not starting MMF d/t hx of colitis)  - Antifungals: nystatin 500,000units Q6  - Antivirals: valcyte 450mg Q48hrs  - Anti PCP & Toxoplasmosis: continue SS Bactrim with c/f PNA     ID: C/f previous yeast infection. BC 3/27 NGTD final. MRSA screen negative 3/28. Episode of hypotension on 4/1, pan cultured and empiric Vanco/Zosyn started.  Patient was shivering 4/3, concern for risk of infection. Blood cultures sent 4/3, NGTD. Zosyn (4/1- 4/7) then broadened to Susan (4/7 - 4/15) and vanco (4/6-4/15) and natalie started (4/7 - 4/12). CT chest 4/9 demonstrates severe multifocal PNA. UA culture with enterococcus but difficult to define as infection given anuric state.  Beta-D glucan mildly elevated 195 thought to be 2/2 recent IVIG though could also be indicative of PJP. Uptrending leukocytosis without clear source if infection.  -->  - Trend temp q4h  - Discontinue atb with no source of infection, improved clinical picture despite elevated white count  - Follow up culture results  linezolid for VRE with ID--> do not treat per ID. Recommending repeat UA but no urine to send.  - Unable to obtain better sputum sample and avoiding NG placement with epistaxis history. Discussing benefit/risk of intubating and bronching for sample vs empirically treating with treatment dose of bactrim --> recommending not to empirically treat for PJP and consider bronch for better sample. Concern about intubating for bronch and potential for being able to extubated.    Skin: Left groin wound from previous ECMO with wound consulted. Wound cx with NG 3/15.   - Preventative Mepilex dressings in place on sacrum and  heels  - Change preventative Mepilex weekly or more frequently as indicated (when moist/soiled)   - Every shift skin assessment per nursing and weekly ICU skin rounds  - Moisture barrier to be applied with christopher care  - Active skin problems addressed with nursing on daily rounds  - wound recs from note 3/15 ordered      Proph:  SCDs  SQH & heparin purge   PPI     G: Lines  RIJ Trialysis - 4/6  R radial maxwell 4/11  PIV x2    Restraints: Not indicated    Code status: Full Code    I personally spent 35 minutes of critical care time directly and personally managing the patient exclusive of separately billable procedures.     A,B,C,D,E,F,G: reviewed.    A&P reviewed on multidisciplinary critical care rounds. Plan for multidisciplinary discussion today to discuss trajectory.      Dispo: CTICU care for now.    CTICU TEAM PHONE [phone]

## 2024-04-15 NOTE — NURSING NOTE
Multidisciplinary ECMO Rounds Note    Attendance:  CT Surgeon: Dr. Marina  CTICU Attending: Dr. Banks  Palliative Care Attending: Dr. Chaudhary  Physical Therapy Present (Y/N): N  Perfusion Present (Y/N): N    ECMO Indication: Cardiogenic shock  ECMO Cannula Configuration: Right fem fem VA    Changes made to Hemodynamic/Ventilator/ECMO Management: Impella will be put back to P1 due to LDH back on up trend    Circuit Concerns: None  Bleeding Concerns: none  Clotting/Ischemia Concerns: none  Anticoagulation Plan/Monitoring: systemic heparin    Mobility Plan/Candidacy: Pt is working with PT sitting at side of bed everyday  Nutrition: NG was removed for a break, pt's appetite is still poor and was educated on importance of adequate nutrition.  Patient/Family Concerns: Family and pt. Hope to be put back on transplant list again soon  Nursing Concerns: none    Patients Minimally Acceptable Outcome: full recovery  Barriers to decannulation/anticipated decannulation date:transplant and heart failure will have another meeting tomorrow to discuss removal of impella and whether or not to re list patient on transplant list. Currently on antibiotics and heart failure recommends to discontinue and see if there is in fact some sort of infection.     ECMO:     Most Recent Range Past 24hrs   Flow 2.5 Patient Flow (L/min)  Min: 2.43  Max: 3.03   Speed 3975 Circuit Flow (RPM)  Min: 2974  Max: 3978   Sweep 5 Sweep Gas Flow Rate (L/min)  Min: 5  Max: 5       dexmedeTOMIDine, 0.1-1.5 mcg/kg/hr, Last Rate: 0.2 mcg/kg/hr (04/15/24 1600)  heparin, 0-4,000 Units/hr, Last Rate: 1,000 Units/hr (04/15/24 1600)  heparin Impella Purge 25 units/mL in 500 mL D5W, 10 mL/hr, Last Rate: 10 mL/hr (04/15/24 1600)  lactated Ringer's, 5 mL/hr, Last Rate: 5 mL/hr (04/15/24 1600)  PrismaSol 4/2.5, 2,400 mL/hr, Last Rate: 2,400 mL/hr (04/13/24 1700)

## 2024-04-15 NOTE — PROGRESS NOTES
Charla Bowles is a 34 y.o. female on day 60 of admission presenting with Cardiogenic shock (Multi).    Subjective   Patient discussed in morning handover, patient examined and chart reviewed. Meds, labs and radiology reviewed during full interdisciplinary rounds this morning. Patient reviewed with Surgical and Cardiology HF today.    Patient discussed in morning handover, patient examined and chart reviewed. Meds, labs and radiology reviewed during full interdisciplinary rounds this morning. Discussed HF team this morning and reviewed with the HF surgeon this afternoon.     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      Patient presented to [facility] ED on 2/15/24 with complaints of N/V x 3 days and inability to keep medications down. POCUS revealed acute systolic heart failure w/ severe BIV dysfunction when compared to previous images in 11/2023. Also noted to have HIRAM requiring CVVH and transaminitis. Immunosuppression notably below therapeutic range. Admitted to HFICU and treated for suspected acute cellular vs. acute antibody mediated rejection (Endomyocardial biopsy on 2/16 revealed mild ACR with +CD4s and negative HLAs). Despite rejection therapy, inotropes and MCS via IABP (2/18/24), the patient's end organ function continued to worsen without improvement in cardiac function. Ultimately placed on left femoral VA ECMO w/ Dr. Marina on 2/19/2024 and transferred to CTICU.      Course in Hospital:     Prolonged course with multiple rounds of PLEX and IvIG and MCS devices including IABP, ECMO and Impella.  Patient has been re-listed for transplant, however recent concerns for sepsis have resulted in de-listing the patient for the  short-term.  Patient has been on antibiotics for 6-7 days and is improving clinically,      PMH:  stage D HFrEF (PPCM) s/p ICD s/p CardioMEMs,    and SLE who is s/p OHT (3/31/2022) with a post-OHT course complicated by RIJ/RUE DVTs, leukopenia, left groin seroma, and asymptomatic 2R ACR (11/2022) treated with steroids, s/p total hysterectomy (2023), Flu A (1/2024).   Remote DVT   Hypothyroidism 2/2 thyroidectomy  Obesity s/p gastric bypass (2017)  MMF colitis     Overnight: No acute issues overnight. Stable MCS device function    Objective     Physical Exam  Constitutional:       Appearance: Normal appearance. She is not ill-appearing, toxic-appearing or diaphoretic.   HENT:      Head:      Comments: No epistaxis for several days.  Eyes:      Pupils: Pupils are equal, round, and reactive to light.   Cardiovascular:      Comments: See separate ECMO note    CVP:  [0 mmHg-6 mmHg] 0 mmHg    Lines:  R internal jugular dialysis (4/6)  R rad. Art. Line.   Abdominal:      General: Bowel sounds are normal.      Palpations: Abdomen is soft.      Tenderness: There is no abdominal tenderness.      Comments: + emesis improved   Genitourinary:     Comments: On CVVHDF  -1.3L for past 24 hours    Minimal urine in bladder on bladder scan this morning.  Neurological:      Mental Status: She is alert and oriented to person, place, and time.      Comments: CAM-ICU -  RASS 0  Slept adequately overnight   Psychiatric:         Behavior: Behavior is cooperative.       Impella Interrogation:      Most Recent Range Past 24hrs   Performance Level 3 P Level  Min: 3   Min taken time: 04/15/24 1000  Max: 3   Max taken time: 04/15/24 1000   Flow (L/min) 1.3 Flow (L/min)  Min: 1.2   Min taken time: 04/15/24 0000  Max: 1.5   Max taken time: 04/15/24 0500   Motor Current 172/122 Motor Current  Min: 93/79   Min taken time: 04/14/24 1600  Max: 181/120   Max taken time: 04/15/24 0100   Placement Signal Yes  Placement OK could not be evaluated. This  "SmartLink does not work with rows of the type: Custom List   Purge (mmHg) 448 Purge Pressure (mmHg)  Min: 392   Min taken time: 04/14/24 1100  Max: 608   Max taken time: 04/15/24 0800   Purge rate (mL/hr) 10.7 Purge Rate (mL/hr)  Min: 10   Min taken time: 04/15/24 0100  Max: 11   Max taken time: 04/15/24 0700   LDH > 2300 ; no position alarms overnight and this morning after increasing too P3 yesterday morning    Last Recorded Vitals  Blood pressure 98/57, pulse 109, temperature 36.7 °C (98.1 °F), temperature source Temporal, resp. rate (!) 33, height 1.549 m (5' 0.98\"), weight 84 kg (185 lb 3 oz), SpO2 92%.  Intake/Output last 3 Shifts:  I/O last 3 completed shifts:  In: 4385 (52.2 mL/kg) [P.O.:1660; I.V.:705 (8.4 mL/kg); Blood:350; NG/GT:970; IV Piggyback:700]  Out: 6589 (78.4 mL/kg) [Other:6139; Stool:450]  Weight: 84 kg     Assessment/Plan   Principal Problem:    Cardiogenic shock (Multi)  Active Problems:    Heart transplant recipient (Multi)    ESRD (end stage renal disease) on dialysis (Multi)    Immunosuppression (Multi)    HIRAM (acute kidney injury) (CMS-HCC)    Acute passive congestion of liver    Hyponatremia    Iron deficiency anemia    Abdominal pain    Systolic congestive heart failure (Multi)    History of left ventricular assist device (Multi)    History of extracorporeal membrane oxygenation treatment    Moderate protein-calorie malnutrition (Multi)    Heart transplant as cause of abnormal reaction or later complication (Multi)    Cardiac transplant rejection (Multi)    Acute combined systolic (congestive) and diastolic (congestive) heart failure (Multi)    ICU Liberation Bundle:  A-Analgesia: Tylenol  B-SBT: not intubated  C-RASS Target: 0  D-CAM: negative  E-Mobilization Plan: ICU rehab with PT  F - Family Last Update: updated by the team    Daily Checklist:  HOB > 30 degrees: extubated  Feeding: p.o. clear liquid diet per SLP + enteral feeds  Thromboprophylaxis: Heparin via purge for Impella and " SCDs  Ulcer Prophylaxis: PPI  Glucose Control: Target ): < 180 achieved; no hypoglycemia  Line(s) to removed: None at present  Bowel Care: Bowel regime held due to loose stools  De-escalation of Antibiotics: Meropenum (April 7th), vancomycin (April 6th)    Plan:  Target -500cc/24 hours  Follow-up with LFTs  Check thyroid levels on April 16th  Rest protocol overnight  Follow-up with cultures (negative to date). WBC up to 22 this morning; ID is following    Will plan HF team meeting this afternoon to discuss listing plan.    I spent 64 minutes in the professional and overall care of this patient.    This critically ill patient continues to be at-risk for clinically significant deterioration / failure due to the above mentioned dysfunctional, unstable organ systems. I have personally identified and managed all complex critical care issues to prevent aforementioned clinical deterioration.  Critical care time is spent at bedside and/or the immediate area and has included, but is not limited to, the review of diagnostic tests, labs, radiographs, serial assessments of hemodynamics, respiratory status, ventilatory management, and family updates.  Time spent in procedures and teaching are reported separately.    Eduardo Banks MD

## 2024-04-15 NOTE — PROGRESS NOTES
Nutrition Note:   Nutrition Assessment       Nutrition History:  Food and Nutrient History: Pt requested feeding tube be removed after SLP cleared her to eat a regular diet with thin liquids. Pt with emesis early 4/14 morning -- unclear cause (?med vs pt's GI anatomy given gastric bypass vs TF?). Still has FMS for liquid stool yet was decreasing with addition of fiber 4/12. At visit, tube out and stated nurse just ordered her a meal. Discussed resumption of ONS.    I/O:   FMS output  4/12  101mls out  4/13  200mls out  4/14  250mls out  4/15  200mls out    Dietary Orders (From admission, onward)       Start     Ordered    04/15/24 1201  Oral nutritional supplements  Until discontinued        Comments: Berry flavor only   Question Answer Comment   Deliver with All meals    Select supplement: Ensure Clear        04/15/24 1200    04/15/24 1018  Adult diet Regular  Diet effective now        Question:  Diet type  Answer:  Regular    04/15/24 1024                     Estimated Needs:   Total Energy Estimated Needs (kCal):  (0354-4668)  Method for Estimating Needs: MSJ = 1479 kcals/d x1.2-1.3 stress factor ; also =31-33 kcals/kg of adjusted BW or =21-23 kcals/kg of actual BW (84 kgs)  Total Protein Estimated Needs (g):  (75-95)  Method for Estimating Needs: 1.5-2.0 gm/kg+ ideal BW  Total Fluid Estimated Needs (mL):  (per team)           Nutrition Diagnosis   Malnutrition Diagnosis  Patient has Malnutrition Diagnosis: Yes  Diagnosis Status: Ongoing  Malnutrition Diagnosis: Moderate malnutrition related to acute disease or injury    Additional Assessment Information: Concerned pt is not going to be able to meet her estimated calorie and protein needs on PO diet alone.. ONS consumption encouraged over regular juice today for increased protein content. Will monitor.     Nutrition Diagnosis  Patient has Nutrition Diagnosis: Yes  Diagnosis Status (1): Ongoing  Nutrition Diagnosis 1: Increased nutrient needs  Related to (1):  altered metabolism for healing/recovery from critical illness  As Evidenced by (1): reinitiation of VA ECMO and CVVH, s/p Impella       Nutrition Interventions/Recommendations         Nutrition Prescription:  Individualized Nutrition Prescription Provided for : Liberalized regular diet + Ensure Clear TID. Allow pt to order from extended stay menu.        Nutrition Interventions:   Interventions: Meals and snacks  Medical Food Supplement: Commercial beverage  Goal: berry flavor Ensure Clear TID for 240 kcals and 8gm protein per 8oz carton       Nutrition Monitoring and Evaluation   Food/Nutrient Related History Monitoring  Monitoring and Evaluation Plan: Energy intake  Criteria: >50-60% est needs met via PO diet + ONS    Body Composition/Growth/Weight History  Weight: Estimated dry weight  Criteria: establish dry wt         Nutrition Focused Physical Findings  Criteria: minimize loss  Criteria: minimize loss       Time Spent/Follow-up Reminder:   Time Spent (min): 30 minutes  Last Date of Nutrition Visit: 04/15/24  Nutrition Follow-Up Needed?: Dietitian to reassess per policy  Follow up Comment: TF's d/c'd > regular diet + ONS

## 2024-04-15 NOTE — PROGRESS NOTES
Speech-Language Pathology  Adult Inpatient Swallow Re-evaluation    Patient Name: Charla Bowles  MRN: [id number]  Today's Date: 4/15/2024   Start Time: 930  Stop Time: 950  Time Calculation (min): 20    Impression:   Clinical swallow bedside re-evaluation completed. Dobhoff in place. Pt A&O x4. Pt eager to begin eating, aunt at bedside. Pt positioned upright in bed. Pt. tolerated ice chips and sips of water without difficulty. Consumed 3 oz of water in consistent large sips without overt s/s of aspiration. Adequate manipulation of puree. Due to previous difficulty with swallowing solids, pt consumed solid dipped in apple sauce x 2 and continued with plain solid x 2 with adequate mastication and full oral clearance. SLP will follow for diet tolerance. Discussed with RN and MD results and recommendations.        Recommendations:  Regular/Thin    Upright for all PO intake  Remain upright for 30-45 min after eating    Goal:   Pt. will tolerate least restrictive diet without overt s/s of aspiration with 100% accuracy.         Plan:  SLP Services Indicated: Yes  Frequency: Other 1x  Discussed POC with patient and aunt  SLP - OK to Discharge    Pain:   0-10  0 = No pain.     Inpatient Education:  Extensive education provided to patient and aunt  regarding current swallow function, recommendations/results, and POC.      Consultations/Referrals/Coordination of Services:   N/A    Cass Clark, SLP Student    Supervising SLP was present, actively participated, and made all clinical decisions during session.  Patrica Barbosa M.S.CCC/SLP-Secure chat

## 2024-04-15 NOTE — PROGRESS NOTES
Pharmacy to Dose Vancomycin:  Charla Bowles is a 34 y.o. female ordered pharmacy to dose vancomycin for pneumonia. Today is day 10 of therapy.  Goal: Trough 15-20mg/L  Results from last 7 days   Lab Units 04/15/24  0739 04/15/24  0737 04/15/24  0108 04/14/24  1417 04/14/24  0514 04/12/24  1237 04/12/24  0501   BUN mg/dL  --   --  16 17 18   < >  --    CREATININE mg/dL  --   --  0.60 0.78 0.66   < >  --    WBC AUTO x10*3/uL 23.1*  --  22.4* 22.5* 18.6*   < >  --    VANCOMYCIN RM ug/mL  --   --   --   --   --   --  13.3   VANCOMYCIN TR ug/mL  --  18.4  --   --  13.9  --   --     < > = values in this interval not displayed.      Staph/MRSA Screen Culture   Date/Time Value Ref Range Status   04/06/2024 09:47 AM No Staphylococcus aureus isolated  Final     Urine Culture   Date/Time Value Ref Range Status   04/09/2024 10:51 AM >100,000 Enterococcus faecium (A)  Final     Comment:     Vancomycin Resistant Enterococcus (VRE)  MultiDrug Resistant Organism (MDRO)     Blood Culture   Date/Time Value Ref Range Status   04/07/2024 09:42 AM No growth at 4 days -  FINAL REPORT  Final   04/07/2024 09:42 AM No growth at 4 days -  FINAL REPORT  Final     Tissue/Wound Culture/Smear   Date/Time Value Ref Range Status   03/15/2024 12:15 PM No growth aerobically and anaerobically  Final     Gram Stain   Date/Time Value Ref Range Status   04/09/2024 03:36 PM (A)  Final    Gram stain indicates specimen contains significant salivary contamination.   04/09/2024 03:36 PM (A)  Final    A specimen of better quality should be submitted if clinically indicated.     C. difficile Toxin, PCR   Date/Time Value Ref Range Status   08/08/2022 10:19 PM NOT DETECTED Not Detected Final     Comment:      This assay detects the presence of the tcdB (toxin B)   gene via DNA amplification, and results should be   interpreted in the context of the patients history   and clinical findings. This test cannot be performed   on formed stools or used as a  test of cure, and should   not be performed more than once per 7 days.        Renal function CVVH dependent. Level today 18.4 drawn 10.5hrs post dose.    Plan:  Continue vanco [id number].  Follow-up level ordered for 4/16 @ 0700 unless clinically indicated sooner.  Pharmacy will continue daily vancomycin monitoring. Please contact the pharmacy with any questions.    Thank you,  Dougie Morelos [facility]       Cyclophosphamide Pregnancy And Lactation Text: This medication is Pregnancy Category D and it isn't considered safe during pregnancy. This medication is excreted in breast milk.

## 2024-04-15 NOTE — PROGRESS NOTES
Charla Bowles is a 34 y.o. female on day 60 of admission presenting with Cardiogenic shock (Multi).    Subjective   Interval History:        Patient seen during late afternoon rounds.       Has Impella       On ECMO  Did not voice any new/additional shortness of breath chest pain  Still has a few upper extremity/hand tremors  Has not produced additional sputum since 4/13      Objective   Range of Vitals (last 24 hours)  Heart Rate:  []   Temp:  [5 °C (41 °F)-36.8 °C (98.2 °F)]   Resp:  [19-45]   BP: ()/(57-93)   SpO2:  [75 %-100 %]   Daily Weight  04/06/24 : 84 kg (185 lb 3 oz)    Body mass index is 35.01 kg/m².    Physical Exam  Alert  A bit tangential at times but able to express her thoughts accurately  Normal word usage  Cardiac support devices in place  Anterolateral lung fluid crackles  No wheezing  Good handgrip  Slightly pale  No palmar peripheral rash    Labs  Results from last 72 hours   Lab Units 04/15/24  1356 04/15/24  0739 04/15/24  0108 04/14/24  1417 04/14/24  0514 04/13/24  0246   WBC AUTO x10*3/uL 24.1* 23.1*  23.1* 22.4*   < > 18.6* 14.0*   HEMOGLOBIN g/dL 7.9* 8.2*  8.2* 6.6*   < > 6.8* 7.1*   HEMATOCRIT % 23.2* 24.6*  24.6* 19.3*   < > 21.4* 21.4*   PLATELETS AUTO x10*3/uL 128* 81*  81* 86*   < > 99* 100*   NEUTROS PCT AUTO %  --   --   --   --   --  81.5   LYMPHO PCT MAN %  --  2.6 7.8  --  1.7  --    LYMPHS PCT AUTO %  --   --   --   --   --  2.2   MONO PCT MAN %  --  6.9 3.4  --  3.4  --    MONOS PCT AUTO %  --   --   --   --   --  9.3   EOSINO PCT MAN %  --  0.0 0.0  --  0.9  --    EOS PCT AUTO %  --   --   --   --   --  0.8    < > = values in this interval not displayed.     Results from last 72 hours   Lab Units 04/15/24  1356 04/15/24  0108 04/14/24  1417   SODIUM mmol/L 135* 136 136   POTASSIUM mmol/L 4.2 4.4 4.1   CHLORIDE mmol/L 96* 96* 96*   CO2 mmol/L 26 24 26   BUN mg/dL 18 16 17   CREATININE mg/dL 0.56 0.60 0.78   GLUCOSE mg/dL 189* 158* 121*   CALCIUM mg/dL  9.0 8.9 9.0   ANION GAP mmol/L 17 20 18   EGFR mL/min/1.73m*2 >90 >90 >90   PHOSPHORUS mg/dL 2.6 2.2* 2.8     Results from last 72 hours   Lab Units 04/15/24  1356 04/15/24  0737 04/15/24  0108 04/14/24  1417 04/14/24  0514 04/13/24  1633 04/13/24  0246   ALK PHOS U/L  --  308*  --   --  324*  --  314*   BILIRUBIN TOTAL mg/dL  --  3.5*  --   --  2.1*  --  2.2*   BILIRUBIN DIRECT mg/dL  --  0.6*  --   --  0.9*  --  1.0*   PROTEIN TOTAL g/dL  --  7.3  --   --  6.3*  --  6.5   ALT U/L  --  39  --   --  31  --  29   AST U/L  --  196*  --   --  82*  --  68*   ALBUMIN g/dL 4.5 4.8 4.7   < > 4.0   < > 4.1    < > = values in this interval not displayed.     Estimated Creatinine Clearance: 125 mL/min (by C-G formula based on SCr of 0.56 mg/dL).  C-Reactive Protein   Date Value Ref Range Status   03/27/2024 4.27 (H) <1.00 mg/dL Final   02/15/2024 6.06 (H) <1.00 mg/dL Final     CRP   Date Value Ref Range Status   01/18/2023 0.68 mg/dL Final     Comment:     REF VALUE  < 1.00       Microbiology  Susceptibility data from last 14 days.  Collected Specimen Info Organism Ampicillin Ciprofloxacin Daptomycin Levofloxacin Linezolid Nitrofurantoin Penicillin Trimethoprim/Sulfamethoxazole Vancomycin   04/09/24 Urine from Straight Catheter Enterococcus faecium R R R R S S R R R   04/01/24 Fluid from SPUTUM Normal throat araceli                Assessment/Plan   34yF with SLE; s/p hysterectomy; and HFrEF s/p ICD 2/2 PPCM s/p 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 (CMV -/+, EBV+/+, Toxo -/-, Hep C -/-) c/b prior ACR 11/2022 treated with steroids. She presented to the ED with N/V in 2/2024 and was found with graft failure and cardiogenic shock. Treated for acute cellular versus antibody-mediated rejection with pulse steroids, IVIG/plasmapheresis x5, and 2 doses of thymoglobulin in 2-3/2024, but repeated biopsies were negative for significant rejection. Over this time she has had refractory cardiogenic shock requiring impella and ECMO support with course c/b ARF  requiring RRT, ALI, hemolysis in setting of impella positioning, bilateral internal jugular DVTs, and coagulopathies.        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       ID were consulted for hypotension, leukocytosis, and AMS. Localizing symptoms for us have largely been epigastric abdominal pain, tachypnea, dry cough, and mild AMS.         VAP  Positive beta-D-glucan, probable false positive from IVIG  Heart failure s/p prior OHT  Now awaiting re-listing for new OHT  4/13/24 New leukocytosis           Work-up most suggestive for VAP. Agree with stopping micafungin, and subsequently vancomycin given negative cultures and MRSA nares.           False positive BDGs are unfortunately common and may be due to IVIG (last given 3/18, can last for several weeks), dialysis filters, albumin products, and blood products, among other causes. She has had exposure to all of these recently. After further review this evening, given her exposures to common causes of BDG false positives, CT chest findings, and heavily immunocompromised state with likely substantial future immunosuppression, it may make most sense to obtain a PJP PCR of the sputum, as repeating a BDG in a week may still be falsely positive anyways for the reasons above.     04/12/2024 Update:       Discussed case with patient and her mother and the primary team.  Clinical suspicion for PJP is low although patient is at high risk.  Also clinical suspicion lower given propensity for IVIG and other agents (see above) to cause false positive beta D glucan testing.  Ideally would do bronchoscopy to obtain deep respiratory sample for BAL viral PCR panel including pneumocystis.  Deep samples could also be examined by sputum/BAL cytology but this has been largely replaced by PCR testing on adequate sample.       Could do nasotracheal aspirate and send  for PJP PCR designating the sample tracheal aspirate/fluid.  Would also send for BAL viral PCR panel to rule out other atypical pathogens (suspicion low).       Would not treat empirically for PJP at this time given paucity of evidence.  Also concerned that treatment for PCP even empiric would mandate 3-week induction followed by maintenance therapy.  Would not initiate empiric therapy unless committed to completing induction therapy followed by long-term maintenance therapy.  Also would need to consider risk for PJP exacerbation when on high-dose immunosuppression posttransplant.  Might be forced to treatment doses at that time which might pose additional risks in the peritransplant setting.          At this time I think we can try to obtain deep respiratory sample with suctioning or via expectoration or via  hypertonic induction (and at the present time avoid traumatic nasotracheal aspiration given history of epistaxis and bronchoscopy).       Also empiric treatment in this setting is difficult and would need to consider whether or not other broad-spectrum antifungal should be included.  Thus without deeper specimen or additional microbiologic or molecular diagnostic testing, would not empirically treat for PJP or other fungi (micafungin or Amphotericin)     4/14/2024 Update:       Clinically stable.  However WBC count has risen to 22,000 over 48 hours.  No new localizing signs or symptoms but will need repeat blood cultures, stool C. difficile PCR if patient has diarrhea.  Will also need updated lines.  No antibiotic change at this time    4/15/2024 update:       Discussed with primary team.  Multidisciplinary team meeting today that recommended discontinuing antibiotics as had completed several courses of antibiotics without clear infectious target.  That said patient has moderate leukocytosis which may derived from several possibilities including all lines, cardiac devices, pneumonia, for example.  Not believe  we need to target enterococcus and bladder.  Not making urine and likely to have residual urine, and mucus that may be secondarily colonized.  No lower pelvic or /urethral complaints at this time     Recommendations:  1.  Will follow-up on sputum/tracheal aspirate from 4/13/2024.  It appears that BAL viral and atypical respiratory pathogen PCR panel is pending  2.  Still have not obtained sputum/saliva for respiratory culture to define araceli with or without salivary contamination.  (Dr. Payton can work with lab but need oral/pharyngeal/tracheal sputum/sample/saliva for culture).       Please give patient a specimen container and she can collect sample over the course of the day  2.  Dr. Payton ordered routine sputum for culture - even if just saliva, will have microbiology culture define araceli in preparation for re-listing for transplant  3.  4/15/24 discontinue vancomycin, meropenem  4.  4/11/24 discontinued micafungin  6.  Continue prophylaxis: Bactrim, valganciclovir 450 q48h  7.  Update lines as needed  8.  May need to consider imaging to rule out occult DVT and / or intra-abdominal process as WBC elevated    Monitoring off abx and teams proceeding with transplant listing depending on clinical status    I spent 45 minutes in the professional and overall care of this patient.    ID team A, pager [phone]    Wilmer Payton MD

## 2024-04-15 NOTE — PROGRESS NOTES
[facility]  ADVANCED HEART FAILURE AND TRANSPLANT CARDIOLOGY   Charla Bowles/[id number]    Admit Date: 2/15/2024  Hospital Length of Stay: 60   ICU Length of Stay: 18d 14h   Primary Service: CTICU    Charla Bowles is a 34 y.o. female on day 60 of admission presenting with Cardiogenic shock (Multi).    Subjective   She remains on VA-ECMO support at 2.7 lpm, Impella 5.5 P-2. Still on CVVH at 250 ml/hr. Net input/output - 376 ml. No febrile episode. She denies chest pain, palpitations, dyspnea, abdominal pain, nausea/vomiting. No reported GI bleeding.     Notable labs - WBC spike to 22 & LDH increase to 2311      Objective     Physical Exam  Constitutional:       Appearance: She is ill-appearing.   HENT:      Head: Normocephalic.      Comments: Not actively bleeding      Mouth/Throat:      Mouth: Mucous membranes are moist.      Pharynx: Oropharynx is clear. No oropharyngeal exudate or posterior oropharyngeal erythema.   Eyes:      Extraocular Movements: Extraocular movements intact.      Conjunctiva/sclera: Conjunctivae normal.      Pupils: Pupils are equal, round, and reactive to light.   Neck:      Vascular: No JVD.   Cardiovascular:      Rate and Rhythm: Tachycardia present.      Comments: Right axillary impella in place ~45cm at hub (no hematoma), Right femoral VA ECMO in place with reperfusion catheter (site appears clean and dry). Dopplerable radial and ulnar pulses bilaterally. Dopplerable PT pulses bilateral. Unable to doppler DP bilaterally.  Pulmonary:      Effort: No accessory muscle usage, respiratory distress or retractions.      Breath sounds: Normal breath sounds. No wheezing, rhonchi or rales.      Comments: RA. Tachypnea w/ RR 20-30. Small amount of blood tinged and purulent sputum.  Abdominal:      General: Abdomen is flat. Bowel sounds are normal. There is no distension.      Palpations: Abdomen is soft. There is no mass.      Hernia: No hernia is present.  "  Musculoskeletal:         General: No swelling (+1 BLE edema) or tenderness. Normal range of motion.      Cervical back: Full passive range of motion without pain.   Skin:     General: Skin is dry.      Capillary Refill: Capillary refill takes less than 2 seconds.      Coloration: Skin is not jaundiced.      Findings: Bruising and lesion (Left groin wound appears clean with granulation tissue. No signs of infection.) present. No erythema, laceration, rash or wound.   Neurological:      General: No focal deficit present.      Mental Status: She is alert and oriented to person, place, and time.   Psychiatric:         Mood and Affect: Mood is not anxious.         Behavior: Behavior normal.     5  Last Recorded Vitals  Blood pressure 98/57, pulse 109, temperature 36.7 °C (98.1 °F), temperature source Temporal, resp. rate (!) 33, height 1.549 m (5' 0.98\"), weight 84 kg (185 lb 3 oz), SpO2 92%.  Intake/Output last 3 Shifts:  I/O last 3 completed shifts:  In: 4385 (52.2 mL/kg) [P.O.:1660; I.V.:705 (8.4 mL/kg); Blood:350; NG/GT:970; IV Piggyback:700]  Out: 6589 (78.4 mL/kg) [Other:6139; Stool:450]  Weight: 84 kg     Relevant Results  Scheduled medications  acetaminophen, 650 mg, oral, q6h  calcium carbonate-vitamin D3, 1 tablet, oral, Daily  cyanocobalamin, 500 mcg, oral, Daily  darbepoetin floyd, 100 mcg, subcutaneous, q14 days  ferrous sulfate (325 mg ferrous sulfate), 65 mg of iron, oral, Daily with breakfast  folic acid, 1 mg, oral, Daily  heparin (porcine), 5,000 Units, subcutaneous, q8h  insulin lispro, 0-15 Units, subcutaneous, q4h  levothyroxine, 250 mcg, oral, Daily  lidocaine, 1 patch, transdermal, Daily  lidocaine, 1 patch, transdermal, Daily  melatonin, 10 mg, oral, Daily  meropenem, 2 g, intravenous, q12h  multivitamin with minerals, 1 tablet, oral, Daily  nystatin, 5 mL, Swish & Swallow, q6h  oxygen, , inhalation, Continuous - Inhalation  pantoprazole, 40 mg, intravenous, Daily  perflutren protein A " microsphere, 0.5 mL, intravenous, Once in imaging  polyethylene glycol, 17 g, oral, Daily  predniSONE, 10 mg, oral, Daily  QUEtiapine, 50 mg, oral, Nightly  [Held by provider] rosuvastatin, 10 mg, oral, Nightly  sennosides-docusate sodium, 2 tablet, oral, BID  [Held by provider] sod phos di, mono-K phos mono, 500 mg, oral, 4x daily  sulfamethoxazole-trimethoprim, 80 mg of trimethoprim, oral, Daily  sulfur hexafluoride microsphr, 2 mL, intravenous, Once in imaging  valGANciclovir, 450 mg, oral, q48h  vancomycin, 750 mg, intravenous, q12h      Continuous medications  [Held by provider] dexmedeTOMIDine, 0.1-1.5 mcg/kg/hr, Last Rate: Stopped (04/11/24 0630)  heparin Impella Purge 25 units/mL in 500 mL D5W, 10 mL/hr, Last Rate: 10 mL/hr (04/15/24 1000)  lactated Ringer's, 5 mL/hr, Last Rate: 5 mL/hr (04/15/24 1000)  PrismaSol 4/2.5, 2,400 mL/hr, Last Rate: 2,400 mL/hr (04/13/24 1700)      PRN medications  PRN medications: alteplase, bisacodyl, calcium gluconate, calcium gluconate, dextrose **OR** glucagon, hydrALAZINE, hydrOXYzine HCL, ipratropium-albuteroL, artificial tears, magnesium sulfate, magnesium sulfate, microfibrllar collagen, mupirocin, naloxone, ondansetron, oxyCODONE, sodium chloride, vancomycin     Results for orders placed or performed during the hospital encounter of 02/15/24 (from the past 24 hour(s))   ECG 12 lead   Result Value Ref Range    Ventricular Rate 96 BPM    Atrial Rate 96 BPM    WA Interval 128 ms    QRS Duration 96 ms    QT Interval 324 ms    QTC Calculation(Bazett) 409 ms    P Axis 59 degrees    R Axis 238 degrees    T Axis -82 degrees    QRS Count 15 beats    Q Onset 227 ms    P Onset 163 ms    P Offset 219 ms    T Offset 389 ms    QTC Fredericia 379 ms   POCT GLUCOSE   Result Value Ref Range    POCT Glucose 143 (H) 74 - 99 mg/dL   POCT GLUCOSE   Result Value Ref Range    POCT Glucose 127 (H) 74 - 99 mg/dL   CBC   Result Value Ref Range    WBC 22.5 (H) 4.4 - 11.3 x10*3/uL    nRBC 10.3 (H)  0.0 - 0.0 /100 WBCs    RBC 2.40 (L) 4.00 - 5.20 x10*6/uL    Hemoglobin 7.0 (L) 12.0 - 16.0 g/dL    Hematocrit 22.6 (L) 36.0 - 46.0 %    MCV 94 80 - 100 fL    MCH 29.2 26.0 - 34.0 pg    MCHC 31.0 (L) 32.0 - 36.0 g/dL    RDW 21.5 (H) 11.5 - 14.5 %    Platelets 136 (L) 150 - 450 x10*3/uL   Renal Function Panel   Result Value Ref Range    Glucose 121 (H) 74 - 99 mg/dL    Sodium 136 136 - 145 mmol/L    Potassium 4.1 3.5 - 5.3 mmol/L    Chloride 96 (L) 98 - 107 mmol/L    Bicarbonate 26 21 - 32 mmol/L    Anion Gap 18 10 - 20 mmol/L    Urea Nitrogen 17 6 - 23 mg/dL    Creatinine 0.78 0.50 - 1.05 mg/dL    eGFR >90 >60 mL/min/1.73m*2    Calcium 9.0 8.6 - 10.6 mg/dL    Phosphorus 2.8 2.5 - 4.9 mg/dL    Albumin 4.4 3.4 - 5.0 g/dL   Calcium, ionized   Result Value Ref Range    POCT Calcium, Ionized 1.07 (L) 1.1 - 1.33 mmol/L   Magnesium   Result Value Ref Range    Magnesium 2.81 (H) 1.60 - 2.40 mg/dL   Lactate dehydrogenase   Result Value Ref Range    LDH 1,879 (H) 84 - 246 U/L   Coagulation Screen   Result Value Ref Range    Protime 13.8 (H) 9.8 - 12.8 seconds    INR 1.2 (H) 0.9 - 1.1    aPTT 38 27 - 38 seconds   POCT GLUCOSE   Result Value Ref Range    POCT Glucose 125 (H) 74 - 99 mg/dL   POCT GLUCOSE   Result Value Ref Range    POCT Glucose 130 (H) 74 - 99 mg/dL   Reticulocytes   Result Value Ref Range    Retic % 5.9 (H) 0.5 - 2.0 %    Retic Absolute 0.128 (H) 0.018 - 0.083 x10*6/uL    Reticulocyte Hemoglobin 36 28 - 38 pg    Immature Retic fraction 34.7 (H) <=16.0 %   CBC and Auto Differential   Result Value Ref Range    WBC 22.4 (H) 4.4 - 11.3 x10*3/uL    nRBC 11.3 (H) 0.0 - 0.0 /100 WBCs    RBC 2.17 (L) 4.00 - 5.20 x10*6/uL    Hemoglobin 6.6 (L) 12.0 - 16.0 g/dL    Hematocrit 19.3 (L) 36.0 - 46.0 %    MCV 89 80 - 100 fL    MCH 30.4 26.0 - 34.0 pg    MCHC 34.2 32.0 - 36.0 g/dL    RDW 21.0 (H) 11.5 - 14.5 %    Platelets 86 (L) 150 - 450 x10*3/uL    Immature Granulocytes %, Automated 9.8 (H) 0.0 - 0.9 %    Immature  Granulocytes Absolute, Automated 2.19 (H) 0.00 - 0.70 x10*3/uL   Lactate Dehydrogenase   Result Value Ref Range    LDH 2,311 (H) 84 - 246 U/L   Calcium, ionized   Result Value Ref Range    POCT Calcium, Ionized 1.15 1.1 - 1.33 mmol/L   Magnesium   Result Value Ref Range    Magnesium 2.88 (H) 1.60 - 2.40 mg/dL   Heparin Assay, UFH   Result Value Ref Range    Heparin Unfractionated 0.2 See Comment Below for Therapeutic Ranges IU/mL   Renal Function Panel   Result Value Ref Range    Glucose 158 (H) 74 - 99 mg/dL    Sodium 136 136 - 145 mmol/L    Potassium 4.4 3.5 - 5.3 mmol/L    Chloride 96 (L) 98 - 107 mmol/L    Bicarbonate 24 21 - 32 mmol/L    Anion Gap 20 10 - 20 mmol/L    Urea Nitrogen 16 6 - 23 mg/dL    Creatinine 0.60 0.50 - 1.05 mg/dL    eGFR >90 >60 mL/min/1.73m*2    Calcium 8.9 8.6 - 10.6 mg/dL    Phosphorus 2.2 (L) 2.5 - 4.9 mg/dL    Albumin 4.7 3.4 - 5.0 g/dL   Manual Differential   Result Value Ref Range    Neutrophils %, Manual 83.6 40.0 - 80.0 %    Lymphocytes %, Manual 7.8 13.0 - 44.0 %    Monocytes %, Manual 3.4 2.0 - 10.0 %    Eosinophils %, Manual 0.0 0.0 - 6.0 %    Basophils %, Manual 0.0 0.0 - 2.0 %    Atypical Lymphocytes %, Manual 2.6 0.0 - 2.0 %    Myelocytes %, Manual 2.6 0.0 - 0.0 %    Seg Neutrophils Absolute, Manual 18.73 (H) 1.20 - 7.00 x10*3/uL    Lymphocytes Absolute, Manual 1.75 1.20 - 4.80 x10*3/uL    Monocytes Absolute, Manual 0.76 0.10 - 1.00 x10*3/uL    Eosinophils Absolute, Manual 0.00 0.00 - 0.70 x10*3/uL    Basophils Absolute, Manual 0.00 0.00 - 0.10 x10*3/uL    Atypical Lymphs Absolute, Manual 0.58 (H) 0.00 - 0.50 x10*3/uL    Myelocytes Absolute, Manual 0.58 0.00 - 0.00 x10*3/uL    Total Cells Counted 116     RBC Morphology See Below     Polychromasia Mild     RBC Fragments Few    Blood Gas Arterial Full Panel   Result Value Ref Range    POCT pH, Arterial 7.43 (H) 7.38 - 7.42 pH    POCT pCO2, Arterial 35 (L) 38 - 42 mm Hg    POCT pO2, Arterial 166 (H) 85 - 95 mm Hg    POCT SO2,  Arterial 100 94 - 100 %    POCT Oxy Hemoglobin, Arterial 94.8 94.0 - 98.0 %    POCT Hematocrit Calculated, Arterial 21.0 (L) 36.0 - 46.0 %    POCT Sodium, Arterial 133 (L) 136 - 145 mmol/L    POCT Potassium, Arterial 4.6 3.5 - 5.3 mmol/L    POCT Chloride, Arterial 100 98 - 107 mmol/L    POCT Ionized Calcium, Arterial 1.16 1.10 - 1.33 mmol/L    POCT Glucose, Arterial 165 (H) 74 - 99 mg/dL    POCT Lactate, Arterial 1.3 0.4 - 2.0 mmol/L    POCT Base Excess, Arterial -1.0 -2.0 - 3.0 mmol/L    POCT HCO3 Calculated, Arterial 23.2 22.0 - 26.0 mmol/L    POCT Hemoglobin, Arterial 6.9 (L) 12.0 - 16.0 g/dL    POCT Anion Gap, Arterial 14 10 - 25 mmo/L    Patient Temperature 37.0 degrees Celsius    FiO2 21 %   Prepare RBC: 1 Units, Leukocytes Reduced (CMV reduced risk)   Result Value Ref Range    PRODUCT CODE Q5574C18     Unit Number [id number]     Unit ABO O     Unit RH POS     XM INTEP COMP     Dispense Status TR     Blood Expiration Date May 09, 2024 23:59 EDT     PRODUCT BLOOD TYPE 5100     UNIT VOLUME 350    Prepare RBC: 1 Units, Leukocytes Reduced (CMV reduced risk)   Result Value Ref Range    PRODUCT CODE F2688L19     Unit Number [id number]     Unit ABO O     Unit RH POS     XM INTEP COMP     Dispense Status XM     Blood Expiration Date May 10, 2024 23:59 EDT     PRODUCT BLOOD TYPE 5100     UNIT VOLUME 282    ECMO ARTERIAL FULL PANEL   Result Value Ref Range    POCT pH, Arterial ECMO 7.36 Reference range not established pH    POCT pCO2, Arterial ECMO 40 Reference range not established mm Hg    POCT pO2,  Arterial ECMO 222 Reference range not established mm Hg    POCT SO2, Arterial ECMO 100 Reference range not established %    POCT Oxy Hemoglobin, Arterial ECMO 94.9 Reference range not established %    POCT Hematocrit Calculated, Arterial ECMO 26.0 (L) 36.0 - 46.0 %    POCT Sodium, Arterial  ECMO 133 (L) 136 - 145 mmol/L    POCT Potassium, Arterial  ECMO 4.6 3.5 - 5.3 mmol/L    POCT Chloride, Arterial  ECMO 98 98  - 107 mmol/L    POCT Ionized Calcium, Arterial  ECMO 1.17 1.10 - 1.33 mmol/L    POCT Glucose, Arterial  ECMO 196 (H) 74 - 99 mg/dL    POCT Lactate Arterial  ECMO 1.6 0.4 - 2.0 mmol/L    POCT Base Excess, Arterial ECMO -2.6 Reference range not established mmol/L    POCT HCO3 Calculated, Arterial ECMO 22.6 Reference range not established mmol/L    POCT Hemoglobin, Arterial  ECMO 8.5 (L) 12.0 - 16.0 g/dL    POCT Anion Gap, Arterial  ECMO 17 Reference range not established mmo/L    Patient Temperature 37.0 degrees Celsius    FiO2 85 %   ECMO VENOUS FULL PANEL   Result Value Ref Range    POCT pH, Venous ECMO 7.34 Reference range not established pH    POCT pCO2, Venous ECMO 48 Reference range not established mm Hg    POCT pO2,  Venous  ECMO 26 Reference range not established mm Hg    POCT SO2, Venous ECMO 42 Reference range not established %    POCT Oxy Hemoglobin, Venous ECMO 39.1 Reference range not established %    POCT Hematocrit Calculated, Venous ECMO 28.0 (L) 36.0 - 46.0 %    POCT Sodium, Venous ECMO 135 (L) 136 - 145 mmol/L    POCT Potassium, Venous ECMO 4.6 3.5 - 5.3 mmol/L    POCT Chloride, Venous ECMO 99 98 - 107 mmol/L    POCT Ionized Calcium, Venous ECMO 1.15 1.10 - 1.33 mmol/L    POCT Glucose, Venous ECMO 186 (H) 74 - 99 mg/dL    POCT Lactate Venous ECMO 1.7 0.4 - 2.0 mmol/L    POCT Base Excess, Venous ECMO -0.1 Reference range not established mmol/L    POCT HCO3 Calculated, Venous ECMO 25.9 Reference range not established mmol/L    POCT Hemoglobin, Venous ECMO 9.2 (L) 12.0 - 16.0 g/dL    POCT Anion Gap, Venous ECMO 15 Reference range not established mmo/L    Patient Temperature 37.0 degrees Celsius    FiO2 85 %   POCT GLUCOSE   Result Value Ref Range    POCT Glucose 167 (H) 74 - 99 mg/dL   Vancomycin, Trough   Result Value Ref Range    Vancomycin, Trough 18.4 5.0 - 20.0 ug/mL   Blood Gas Arterial Full Panel   Result Value Ref Range    POCT pH, Arterial 7.35 (L) 7.38 - 7.42 pH    POCT pCO2, Arterial 45 (H)  38 - 42 mm Hg    POCT pO2, Arterial 163 (H) 85 - 95 mm Hg    POCT SO2, Arterial 100 94 - 100 %    POCT Oxy Hemoglobin, Arterial 94.3 94.0 - 98.0 %    POCT Hematocrit Calculated, Arterial 26.0 (L) 36.0 - 46.0 %    POCT Sodium, Arterial 134 (L) 136 - 145 mmol/L    POCT Potassium, Arterial 4.3 3.5 - 5.3 mmol/L    POCT Chloride, Arterial 99 98 - 107 mmol/L    POCT Ionized Calcium, Arterial 1.19 1.10 - 1.33 mmol/L    POCT Glucose, Arterial 153 (H) 74 - 99 mg/dL    POCT Lactate, Arterial 1.4 0.4 - 2.0 mmol/L    POCT Base Excess, Arterial -0.9 -2.0 - 3.0 mmol/L    POCT HCO3 Calculated, Arterial 24.8 22.0 - 26.0 mmol/L    POCT Hemoglobin, Arterial 8.8 (L) 12.0 - 16.0 g/dL    POCT Anion Gap, Arterial 15 10 - 25 mmo/L    Patient Temperature 37.0 degrees Celsius    FiO2 21 %   CBC   Result Value Ref Range    WBC 23.1 (H) 4.4 - 11.3 x10*3/uL    nRBC 13.1 (H) 0.0 - 0.0 /100 WBCs    RBC 2.74 (L) 4.00 - 5.20 x10*6/uL    Hemoglobin 8.2 (L) 12.0 - 16.0 g/dL    Hematocrit 24.6 (L) 36.0 - 46.0 %    MCV 90 80 - 100 fL    MCH 29.9 26.0 - 34.0 pg    MCHC 33.3 32.0 - 36.0 g/dL    RDW 19.0 (H) 11.5 - 14.5 %    Platelets 81 (L) 150 - 450 x10*3/uL   POCT GLUCOSE   Result Value Ref Range    POCT Glucose 137 (H) 74 - 99 mg/dL   Transthoracic Echo (TTE) Limited   Result Value Ref Range    BSA 2.05 m2     *Note: Due to a large number of results and/or encounters for the requested time period, some results have not been displayed. A complete set of results can be found in Results Review.        Malnutrition Diagnosis Status: Ongoing  Malnutrition Diagnosis: Moderate malnutrition related to acute disease or injury  As Evidenced by: pt's reported intake likely meeting <75% of estimated needs for at least 7 days, previous 5% weight loss in 1 month, LOS 42.  I agree with the dietitian's malnutrition diagnosis.      Assessment/Plan   Ms. Yimi Bowles is a 34F with a PMHx sig for stage D HFrEF (PPCM) s/p ICD s/p CardioMEMs, hypothyroidism 2/2  thyroidectomy, obesity s/p gastric bypass (2017), and SLE who is s/p OHT (3/31/2022) with a post-OHT course complicated by RIJ/RUE DVTs, leukopenia, left groin seroma, and asymptomatic 2R ACR (11/2022) treated with steroids, s/p total hysterectomy (2023), Flu A (1/2024).     Originally presented to [facility] ED on 2/15/24 with complaints of N/V x 3 days and inability to keep medications down. Found to have acute systolic heart failure with primary graft failure and cardiogenic shock. Treated for suspected acute cellular vs. acute antibody mediated rejection; however, multiple cardiac biopsies negative for pathology indicating rejection or other causes of graft failure. Course has been complicated by multiple MCS devices for refractory cardiogenic shock (right femoral IABP: 2/18/24 - 3/1/24, Left femoral VA ECMO: 2/19/24 - 2/29/24, Right axillary impella 5.5: 3/1/24 - remains in place, 2nd right femoral VA ECMO: 3/27/24 - remains in place), ARF requiring RRT, acute liver injury, intubation due to hypoxic respiratory failure, severe hemolysis 2/2 to impella malposition, mild CMV viremia, bilateral provoked IJ DVTs, multiple episodes of epistaxis & coagulopathies requiring ongoing blood product transfusions, & HCAP.       Transferred to CTICU on 3/27/24 following right femoral VA ECMO placement due to worsening cardiogenic shock and multi-organ failure despite Impella 5.5 support and multiple inotropes. Attempting for retransplantation of heart and new kidney transplantation.     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   #UTI w/ VRE?        Donor/Recipient Infectious history:  CMV: -/+ (low grade CMV viremia w/  levels < 35 on 3/1/24, repeat CMV levels remain negative since 4/7/24)  Toxo: -/-   Hep C: -/-     Rejection/Prophylaxis (transplants):  - Steroids: Continue 10mg PO prednisone daily (risk for adrenal insufficiency if stopped)  - Tacrolimus: stopped on 4/9/24 due to infection   - Myfortic acid: stopped on 4/9/24 due to infection  - Antifungals: nystatin 500,000units Q6  - Antivirals: valcyte 450mg Q48hrs   - Anti PCP & Toxoplasmosis: bactrim 200-40mg daily      Last cardiac biopsy: 2/16/24 with ACR grade 1R and no AMR (extremely low C4d+ detected), 2/20/24 negative for ACR and AMR  Last HLA: 4/2/24 negative for class 1 or 2 antibodies   Last RHC: closing numbers on 3/16/24 /86(97) RA 20, PAP 40/33(37), C.O./C.I. 5.5/2.8, PVR 88, SVR 1115   Last LHC: 2/18/24 negative for CAV and CAD   Last TTE/BOB: 4/6/24 shows severe LVEF w/ global hypokinesis, severe RV dysfunction, mild-mod MR, mild TR and impella angled posteriorly   Osteopenia/osteoporosis prophylaxis: Vitamin D3 currently held. Continue calcium supplements  Peptic/gastric ulcer prophylaxis: Pantoprazole 40mg daily  CAV Prophylaxis: Holding Aspirin 81mg due to continued thrombocytopenia. Holding rosuvastatin due to transaminases.      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 Broad differential including SLE flair, giant cell vasculitis, CAV/CAD, viral cardiomyopathy, sarcoidosis, amyloidosis and allograft rejection amongst many others. 2xallograft biopsies on 2/16 & 2/20 remain negative for any significant pathology. Notably, both biopsies taken after rejection therapies implemented which may have reduced areas of graft damage.    - DSAs remain negative; however, patient may have non-HLA antibodies present. 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   - Negative CAV & CAD via LHC on 2/18/24   - Completed methylpred steroid pulse w/ 1g Q24 x 3 days (2/16-2/18) and prednisone taper   - Thymoglobulin doses: 2/18 & 2/19   - IVIG + PLEX  Session: 2/18, 2/20, 3/7, 3/11 and 3/13    - Right femoral VA ECMO flowing 2.7L w/ FIO2 80% and sweep 4  - Right axillary impella for LV venting remains in place and set P2 w/ flows of 1.0  - Impella remains poorly positioned and appears to be in contact with mitral valve leaflets. LDH remains elevated but stable.   - LFTs remain elevated but stable  - Failed formal swallow eval on 4/10/24; ENT placed dobhoff w/ tubefeeds for nutritional support   - Delirium and anxiety improving   - Sitting at edge of bed and standing daily w/ PT/OT & CTICU team assistance   - CT scan on 4/9/24 shows bilateral pulmonary infiltrates throughout lung fields, L>R, confirming HCAP. Unable to obtain adequate respiratory culture.   - UC from 4/9 also growing VRE    - Elevated fungitell beta-D serum level; normally indicative of invasive fungal infections vs. PCP infection. ID recommending rechecking level in 1xweek as multiple confounding factors may lead to false elevation   - Remains on broad spectrum antibiotics w/ IV vancomycin, micafungin and meropenem     Transplant Status:  - Was listed Status 1 for combined heart-kidney (listed in UNOS on 4/2/24); however, on 4/6 changed to UNOS Status 7 given development of new infection, coagulopathy, encephalopathy/delirium. Assessing upgrade in status daily.   - ABO status: O+  - CMV+/EBV+/Toxo-/Hep B-/Hep C-  - Prospective cross match with every donor offer  - Due to potential risk of hyperacute allograft rejection, induction plan will be 500mg solumedrol x 2 (followed by steroid taper) and thymoglobulin       Recommendations:  - Continue heparin purge through impella   - Repeat TTE (limited) to evaluate Impella positioning  - Hold off on bronchoscopy at this time   - Anticipating reactivation to Status 1 within the week, will discuss with selection committee   - C/w Susan and Vanc per ID team, appreciate recs     Continue excellent care per CTICU team.      Patient was examined and discussed  with Advanced Heart Failure and Transplant Cardiology attending physician, Dr. Sherman     Heart Transplant Team:   [employer] Secure Chat  [id number] during day shift  [id number] during night shift     Delia Dunn, APRN-CNP

## 2024-04-15 NOTE — PROGRESS NOTES
Physical Therapy    Physical Therapy Treatment    Patient Name: Charla Bowles  MRN: [id number]  Today's Date: 4/15/2024  Time Calculation  Start Time: 1415  Stop Time: 1505  Time Calculation (min): 50 min       Assessment/Plan   PT Assessment  PT Assessment Results: Decreased strength, Decreased endurance, Impaired balance, Decreased mobility  Rehab Prognosis: Good  Evaluation/Treatment Tolerance: Patient tolerated treatment well  Medical Staff Made Aware: Yes  End of Session Communication: Bedside nurse (NP)  End of Session Patient Position: Bed, 3 rail up, Alarm off, not on at start of session  PT Plan  Inpatient/Swing Bed or Outpatient: Inpatient  PT Plan  Treatment/Interventions: Bed mobility, Transfer training, Gait training, Balance training, Neuromuscular re-education, Strengthening, Endurance training, Therapeutic exercise, Therapeutic activity  PT Plan: Skilled PT  PT Frequency: 5 times per week  PT Discharge Recommendations: High intensity level of continued care  PT Recommended Transfer Status: Assist x2  PT - OK to Discharge: Yes      General Visit Information:   PT  Visit  PT Received On: 04/15/24  Response to Previous Treatment: Patient with no complaints from previous session.  General  Missed Visit: No  Family/Caregiver Present: No  Co-Treatment: OT  Co-Treatment Reason: Pt on ECMO requiring skilled 2 person assist for safe mobility  Prior to Session Communication: Bedside nurse, Physician (CNP)  Patient Position Received: Bed, 3 rail up, Alarm off, not on at start of session  Preferred Learning Style: auditory, verbal  General Comment: Pt awake, alert and willing to participate in PT session.  Assisted pt with completion of bed mobility, EOB sitting ~20 min and sit<>stand transfers x2 with static standing ~5 min in total between 2 stands.  Pt with stable vitals throughout session. R femoral ECMO flow 2.95, 85% FiO2, sweep 5, R axillary impella (P3) flow 1.4, CVVH through ECMO. ECMO and impella  examined pre, during and post mobility, stable and secure. (Precedex (.4);  Noted x1 stitch on securement clip to skin was off - perfusionist assessed and ok'd mobility without re-stitching.)    Subjective   Precautions:  Precautions  Medical Precautions: Cardiac precautions, Fall precautions  Precautions Comment: R axillary impella precautions- no pushing/pulling with R UE, No lifting R UE above shoulder height, no R UE humeral EXT past plane of body, R femoral ECMO precautions, MAP 70-90, protective precautions  Vital Signs:  Vital Signs  Heart Rate: 108 (Post: 113)  Heart Rate Source: Monitor  Resp: (!) 35 (Post: 15)  SpO2: 100 % (Post: 95)  BP: (!) 108/93 (Post: 100/89)  MAP (mmHg): 99 (Post: 93)  BP Location: Right arm  BP Method: Arterial line  Patient Position: Lying    Objective   Pain:  Pain Assessment  Pain Assessment:  (Pt did not report pain througout session)  Cognition:  Cognition  Overall Cognitive Status: Within Functional Limits  Arousal/Alertness: Appropriate responses to stimuli  Orientation Level: Oriented X4  Following Commands: Follows all commands and directions without difficulty  Activity Tolerance:  Activity Tolerance  Endurance: Tolerates 30 min exercise with multiple rests  Early Mobility/Exercise Safety Screen: Proceed with mobilization - No exclusion criteria met  Treatments:  Therapeutic Exercise  Therapeutic Exercise Performed: Yes  Therapeutic Exercise Activity 1: 1x5 LLE LAQ at EOB  Therapeutic Exercise Activity 2: 1x8 seated L hip flexion (minimal ROM d/t weakness)    Therapeutic Activity  Therapeutic Activity Performed: Yes  Therapeutic Activity 1: Increased time for skilled ICU line/tube management and room set up to complete safe functional mobility  Therapeutic Activity 2: Pt sat EOB ~20 min with CGA.  Pt demonstrating good seated posture with no instability noted.  Pt with increase anxiety during session, OT assisting with anxiety between stands.  Therapeutic Activity 3: x1  bout of static standing ~3 min in total with minAx2, B arm in arm.  B knees blocked for safety.  Completing lateral weight shifting intermittently.  Therapeutic Activity 4: x1 bout of static standing ~2 min in total, B arm in arm, B knees blocked    Bed Mobility  Bed Mobility: Yes  Bed Mobility 1  Bed Mobility 1: Supine to sitting, Sitting to supine  Level of Assistance 1: Dependent (x2)  Bed Mobility Comments 1: HOB slightly elevated, draw sheet utilized  Bed Mobility 2  Bed Mobility  2:  (Boost towards HOB)  Level of Assistance 2: Dependent (x2)  Bed Mobility Comments 2: HOB flat, draw sheet utilized    Ambulation/Gait Training  Ambulation/Gait Training Performed: No (Educated pt on goal for this week is to complete lateral side steps)  Transfers  Transfer: Yes  Transfer 1  Transfer From 1: Sit to, Stand to  Transfer to 1: Sit, Stand  Technique 1: Sit to stand, Stand to sit  Transfer Device 1:  (B arm in arm)  Transfer Level of Assistance 1: Moderate assistance (x2)  Trials/Comments 1: x2 trials from EOB;  B knees blocked, draw sheet utilized under hips, cues to bring hips forward.    Stairs  Stairs: No    Outcome Measures:  [facility] Basic Mobility  Turning from your back to your side while in a flat bed without using bedrails: A lot  Moving from lying on your back to sitting on the side of a flat bed without using bedrails: A lot  Moving to and from bed to chair (including a wheelchair): A lot  Standing up from a chair using your arms (e.g. wheelchair or bedside chair): A lot  To walk in hospital room: Total  Climbing 3-5 steps with railing: Total  Basic Mobility - Total Score: 10    FSS-ICU  Ambulation: Unable to attempt due to weakness  Rolling: Maximal assistance (performs 25% - 49% of task)  Sitting: Supervision or set-up only  Transfer Sit-to-Stand: Maximal assistance (performs 25% - 49% of task)  Transfer Supine-to-Sit: Total assistance (performs 25% or requires another person)  Total Score: 10    Education  Documentation  Precautions, taught by Michelle Lee PT at 4/15/2024  3:45 PM.  Learner: Patient  Readiness: Acceptance  Method: Explanation, Demonstration  Response: Verbalizes Understanding, Demonstrated Understanding    Body Mechanics, taught by Mihcelle Lee PT at 4/15/2024  3:45 PM.  Learner: Patient  Readiness: Acceptance  Method: Explanation, Demonstration  Response: Verbalizes Understanding, Demonstrated Understanding    Mobility Training, taught by Michelle Lee PT at 4/15/2024  3:45 PM.  Learner: Patient  Readiness: Acceptance  Method: Explanation, Demonstration  Response: Verbalizes Understanding, Demonstrated Understanding    Education Comments  No comments found.    EDUCATION:       Encounter Problems       Encounter Problems (Active)       Balance       Pt will demonstrate ability to complete sitting static/dynamic balance activities with unilateral UE support and no LOB for increase in safety up D/C.  (Progressing)       Start:  04/02/24    Expected End:  04/23/24               Mobility       Pt will demonstrated ability to complete 5 lateral side steps with modAx1, LRAD, proper form and no balance deficits for safe DC. (Progressing)       Start:  04/02/24    Expected End:  04/23/24            Pt will demonstrate ability to complete ther ex activities to improve functional strength for increase in independence upon DC.  (Progressing)       Start:  04/02/24    Expected End:  04/23/24               PT Transfers       Pt will demonstrated ability to complete bed mobility and sit<>stand transfers with mod assistance x2 and use of assistive device to safely DC. (Progressing)       Start:  04/02/24    Expected End:  04/23/24

## 2024-04-15 NOTE — PROGRESS NOTES
ECMO:  Cannulation Date: (most recent) 3/27  ECMO Indication: Cardiogenic Shock  Current Goals of Care: Full Code    ECMO Cannula Configuration: Right femoral venous-right femoral arterial   ECMO circuit run from drainage cannula to pump to oxygenator to return cannula: Yes  Pre/post PaO2 adequate: Yes  LV Unloading/Pulse Pressure: Right axillary Impella 5.5 at P2, 1L flow   Distal Perfusion: R DPC in place, adequate pulses    ECMO Settings:     Most Recent Range Past 24hrs   Flow 2.96 Patient Flow (L/min)  Min: 2.7  Max: 3.03   Speed 3975 Circuit Flow (RPM)  Min: 2974  Max: 3978   Sweep 5 Sweep Gas Flow Rate (L/min)  Min: 5  Max: 5     Invasive Hemodynamics:    Most Recent Range Past 24hrs   BP (Art) 108/96 Arterial Line BP 1  Min: 89/78  Max: 110/97   MAP(Art) 101 mmHg Arterial Line MAP 1 (mmHg)   Min: 79 mmHg  Max: 102 mmHg   RA/CVP   No data recorded   PA 41/34 No data recorded   PA(mean) 37 mmHg No data recorded   CO 5.5 L/min No data recorded   CI 2.8 L/min/m2 No data recorded   Mixed Venous 42.9 % SVO2 (%)  Min: 36.2 %  Max: 43.9 %   SVR  1115 (dyne*sec)/cm5 No data recorded     Impella Interrogation:      Most Recent Range Past 24hrs   Performance Level 3 P Level  Min: 3   Min taken time: 04/15/24 1000  Max: 3   Max taken time: 04/15/24 1000   Flow (L/min) 1.3 Flow (L/min)  Min: 1.2   Min taken time: 04/15/24 0000  Max: 1.5   Max taken time: 04/15/24 0500   Motor Current 172/122 Motor Current  Min: 93/79   Min taken time: 04/14/24 1600  Max: 181/120   Max taken time: 04/15/24 0100   Placement Signal Yes  Placement OK could not be evaluated. This SmartLink does not work with rows of the type: Custom List   Purge (mmHg) 448 Purge Pressure (mmHg)  Min: 392   Min taken time: 04/14/24 1100  Max: 608   Max taken time: 04/15/24 0800   Purge rate (mL/hr) 10.7 Purge Rate (mL/hr)  Min: 10   Min taken time: 04/15/24 0100  Max: 11   Max taken time: 04/15/24 0700   LDH up to 2311 (up from yesterday); no alarms  overnight    Ventilator Management:    Not intubated    Bleeding/Clot Issues:   Anticoagulation/Monitoring: Heparin through Impella purge and heparin assay  Presence of cannula bleeding: None at present  Presence of oxygenator clot: None at present    Management Issues:  Road Trips planned for today? None for today  Mobility Planned today? Sit at side of bed   Weaning Plan/date: Currently status 7 on transplant list for heart/kidney; Will re-assess on Monday for possible re-listing.  Family Updates/Concerns? Family updated during rounds this morning and palliative care consult entered    Patient requires ECMO support. Drainage cannula site, cannula, pump, oxygenator, return cannula and return cannula site clinically inspected. RPM and sweep reviewed and adjusted appropriate to clinical context. Anticoagulation status and intensity discussed. Plan for weaning, next clinical steps discussed with team and family. Discussed with cardiothoracic surgeon, perfusion, palliative care and physical/occupational therapy.     ECMO ROUNDS:  The following staff  were in attendance during formal interdisciplinary ECMO team rounds today:    Cardiac Surgery: Gray  CTICU: Jocelyn  Palliative Care: Neena   Card: Anais  ECMO Coordinator: Present  Perfusion: Not available    In addition to review of  blood test results, diet, imaging/procedure results, issues/problems expressed by family, medications, the following topics were discussed:    Will plan to stop antibiotics today  Will discuss on re-listing at Transplant meeting tomorrow  Decrease Impella to P1 - P2 to determine if hemolysis will improve.    I, as the attending critical care physician, have personally reviewed the recent patient history, physical exam, vital signs, significant labs, medications, imaging, and ECMO settings/flows of this critically ill patient. Using this information I will continue to actively manage this critically ill patient's extracorporeal membrane  oxygenation (ECMO)/extracorporeal life support (ECLS) as documented in the assessment and plan above.

## 2024-04-16 NOTE — PROGRESS NOTES
Per heart team, patient should be re-acted to status 1 on heart and kidney transplant list.  Patient reactivated on kidney transplant list. UNET updated

## 2024-04-16 NOTE — PROGRESS NOTES
Physical Therapy    Physical Therapy Treatment    Patient Name: Charla Bowles  MRN: [id number]  Today's Date: 4/16/2024  Time Calculation  Start Time: 1425  Stop Time: 1509  Time Calculation (min): 44 min       Assessment/Plan   PT Assessment  PT Assessment Results: Decreased strength, Decreased endurance, Impaired balance, Decreased mobility  Rehab Prognosis: Good  Evaluation/Treatment Tolerance: Patient tolerated treatment well  Medical Staff Made Aware: Yes  End of Session Communication: Bedside nurse  End of Session Patient Position: Bed, 3 rail up, Alarm off, not on at start of session  PT Plan  Inpatient/Swing Bed or Outpatient: Inpatient  PT Plan  Treatment/Interventions: Bed mobility, Transfer training, Gait training, Balance training, Neuromuscular re-education, Strengthening, Endurance training, Therapeutic exercise, Therapeutic activity  PT Plan: Skilled PT  PT Frequency: 5 times per week  PT Discharge Recommendations: High intensity level of continued care  PT Recommended Transfer Status: Assist x2  PT - OK to Discharge: Yes      General Visit Information:   PT  Visit  PT Received On: 04/16/24  Response to Previous Treatment: Patient with no complaints from previous session.  General  Missed Visit: No  Family/Caregiver Present: No  Co-Treatment: OT  Co-Treatment Reason: Pt on ECMO requiring skilled 2 person assist for safe mobility  Prior to Session Communication: Bedside nurse, Physician (NP)  Patient Position Received: Bed, 3 rail up, Alarm off, not on at start of session  Preferred Learning Style: auditory, verbal  General Comment: Pt awake, alert and willing to participate in PT session.  Assisted pt with completion of bed mobility, EOB sitting ~25 min and sit<>stand transfers x3 with static standing ~7.5 min in total.  Pt with stable vitals throughout session. R femoral ECMO flow 2.60/2.55, 95% FiO2, sweep 4, R axillary impella (P2) flow 1.5, CVVH through ECMO. ECMO and impella examined pre,  during and post mobility, stable and secure.    Subjective   Precautions:  Precautions  Medical Precautions: Cardiac precautions, Fall precautions  Precautions Comment: R axillary impella precautions- no pushing/pulling with R UE, No lifting R UE above shoulder height, no R UE humeral EXT past plane of body, R femoral ECMO precautions, MAP 70-90, protective precautions  Vital Signs:  Vital Signs  Heart Rate: 105 (Post: 79)  Heart Rate Source: Monitor  Resp: 25  BP: 105/88 (Post: 81/61)  MAP (mmHg): 77 (Post: 68)  BP Location: Right arm  BP Method: Arterial line  Patient Position: Lying    Objective   Pain:  Pain Assessment  Pain Assessment:  (Pt reported back pain - unreported)  Cognition:  Cognition  Overall Cognitive Status: Impaired  Arousal/Alertness: Delayed responses to stimuli  Orientation Level:  (AOx3)  Following Commands: Follows one step commands with increased time (and repitition)  Cognition Comments: CAM-ICU (+), very drowsy this date. Self directed with all tasks  Activity Tolerance:  Activity Tolerance  Endurance: Tolerates 30 min exercise with multiple rests  Early Mobility/Exercise Safety Screen: Proceed with mobilization - No exclusion criteria met  Treatments:     Therapeutic Activity  Therapeutic Activity Performed: Yes  Therapeutic Activity 1: Increased time for line management and room set up for safe functional mobility.  Therapeutic Activity 2: Pt sat EOB ~25 min with CGA and good stability. Intermittently leaning too far foward with cues to adjust posture.  Therapeutic Activity 3: x3 bouts of static standing (2.5 min, 3 min, 2 min);  MinAx2 provided with B knees blocked.  Therapeutic Activity 4: Pt demostrated ability to complete heel-toe pivot with BLEs x1: maxAx2, B arm in arm (Assistance with weight shifting to complete.  B knees blocked.)    Bed Mobility  Bed Mobility: Yes  Bed Mobility 1  Bed Mobility 1: Supine to sitting, Sitting to supine  Level of Assistance 1: Dependent (x2)  Bed  Mobility Comments 1: HOB elevated  Bed Mobility 2  Bed Mobility  2:  (Boost towards HOB)  Level of Assistance 2: Dependent (x2)  Bed Mobility Comments 2: HOB flat, draw sheet utilized    Ambulation/Gait Training  Ambulation/Gait Training Performed:  (Refer to ther ac section)  Transfers  Transfer: Yes  Transfer 1  Transfer From 1: Sit to, Stand to  Transfer to 1: Sit, Stand  Technique 1: Sit to stand, Stand to sit  Transfer Device 1:  (B arm in arm)  Transfer Level of Assistance 1:  (Mod-maxAx2)  Trials/Comments 1: x3 trials; 1st trial modAx2, 2nd/3rd trial maxAx2 (B knees blocked to complete)    Stairs  Stairs: No    Outcome Measures:  [facility] Basic Mobility  Turning from your back to your side while in a flat bed without using bedrails: A lot  Moving from lying on your back to sitting on the side of a flat bed without using bedrails: A lot  Moving to and from bed to chair (including a wheelchair): A lot  Standing up from a chair using your arms (e.g. wheelchair or bedside chair): A lot  To walk in hospital room: Total  Climbing 3-5 steps with railing: Total  Basic Mobility - Total Score: 10    FSS-ICU  Ambulation: Unable to attempt due to weakness  Rolling: Maximal assistance (performs 25% - 49% of task)  Sitting: Supervision or set-up only  Transfer Sit-to-Stand: Maximal assistance (performs 25% - 49% of task)  Transfer Supine-to-Sit: Maximal assistance (performs 25% - 49% of task)  Total Score: 11    Education Documentation  Precautions, taught by Michelle Lee PT at 4/16/2024  4:04 PM.  Learner: Patient  Readiness: Acceptance  Method: Explanation, Demonstration  Response: Verbalizes Understanding, Demonstrated Understanding    Body Mechanics, taught by Michelle Lee PT at 4/16/2024  4:04 PM.  Learner: Patient  Readiness: Acceptance  Method: Explanation, Demonstration  Response: Verbalizes Understanding, Demonstrated Understanding    Mobility Training, taught by Michelle Lee PT at 4/16/2024  4:04  PM.  Learner: Patient  Readiness: Acceptance  Method: Explanation, Demonstration  Response: Verbalizes Understanding, Demonstrated Understanding    Education Comments  No comments found.    EDUCATION:       Encounter Problems       Encounter Problems (Active)       Balance       Pt will demonstrate ability to complete sitting static/dynamic balance activities with unilateral UE support and no LOB for increase in safety up D/C.  (Progressing)       Start:  04/02/24    Expected End:  04/23/24               Mobility       Pt will demonstrated ability to complete 5 lateral side steps with modAx1, LRAD, proper form and no balance deficits for safe DC. (Progressing)       Start:  04/02/24    Expected End:  04/23/24            Pt will demonstrate ability to complete ther ex activities to improve functional strength for increase in independence upon DC.  (Progressing)       Start:  04/02/24    Expected End:  04/23/24               PT Transfers       Pt will demonstrated ability to complete bed mobility and sit<>stand transfers with mod assistance x2 and use of assistive device to safely DC. (Progressing)       Start:  04/02/24    Expected End:  04/23/24

## 2024-04-16 NOTE — PROGRESS NOTES
CTICU Progress Note  Charla Bowles/[id number]    Admit Date: 2/15/2024  Hospital Length of Stay: 61   ICU Length of Stay: 19d 19h   CT SURGEON: Dr. Witt    SUBJECTIVE:   Relisted status 1a this morning for heart/kidney.     MEDICATIONS  Infusions:  dexmedeTOMIDine, Last Rate: Stopped (04/16/24 1300)  heparin, Last Rate: 600 Units/hr (04/16/24 1400)  heparin Impella Purge 25 units/mL in 500 mL D5W, Last Rate: 10 mL/hr (04/16/24 1200)  lactated Ringer's, Last Rate: 5 mL/hr (04/16/24 1200)  PrismaSol 4/2.5, Last Rate: 2,400 mL/hr (04/13/24 1700)      Scheduled:  calcium carbonate-vitamin D3, 1 tablet, Daily  cyanocobalamin, 500 mcg, Daily  darbepoetin floyd, 100 mcg, q14 days  ferrous sulfate (325 mg ferrous sulfate), 65 mg of iron, Daily with breakfast  folic acid, 1 mg, Daily  insulin lispro, 0-15 Units, TID with meals  levothyroxine, 75 mcg, q24h TRICIA  lidocaine, 1 patch, Daily  melatonin, 10 mg, Daily  multivitamin with minerals, 1 tablet, Daily  nystatin, 5 mL, q6h  oxygen, , Continuous - Inhalation  pantoprazole, 40 mg, Daily  polyethylene glycol, 17 g, Daily  potassium, sodium phosphates, 1 packet, 4x daily  predniSONE, 10 mg, Daily  QUEtiapine, 50 mg, Nightly  [Held by provider] rosuvastatin, 10 mg, Nightly  sennosides-docusate sodium, 2 tablet, BID  sulfamethoxazole-trimethoprim, 80 mg of trimethoprim, Daily  valGANciclovir, 450 mg, q48h      PRN:  acetaminophen, 650 mg, q6h PRN  alteplase, 2 mg, PRN  bisacodyl, 10 mg, Daily PRN  calcium gluconate, 1 g, q6h PRN  calcium gluconate, 2 g, q6h PRN  dextrose, 25 g, q15 min PRN   Or  glucagon, 1 mg, q15 min PRN  hydrALAZINE, 5 mg, q4h PRN  hydrOXYzine HCL, 25 mg, q6h PRN  ipratropium-albuteroL, 3 mL, q6h PRN  artificial tears, 2 drop, PRN  magnesium sulfate, 2 g, q6h PRN  magnesium sulfate, 4 g, q6h PRN  microfibrllar collagen, , PRN  mupirocin, , BID PRN  naloxone, 0.2 mg, q5 min PRN  ondansetron, 4 mg, q4h PRN  oxyCODONE, 5 mg, q6h PRN  sodium chloride, 1  "spray, 4x daily PRN        PHYSICAL EXAM:   Visit Vitals  BP 89/73 Comment: post: 83/64   Pulse 106 Comment: Post: 81   Temp 36.3 °C (97.3 °F) (Temporal)   Resp (!) 27   Ht 1.549 m (5' 0.98\")   Wt 84 kg (185 lb 3 oz)   SpO2 97%   BMI 35.01 kg/m²   OB Status Hysterectomy   Smoking Status Never   BSA 1.9 m²     Wt Readings from Last 5 Encounters:   04/06/24 84 kg (185 lb 3 oz)   12/07/23 92.1 kg (203 lb)   12/01/23 93 kg (205 lb)   11/29/23 92.9 kg (204 lb 12.8 oz)   11/09/23 91.3 kg (201 lb 3.2 oz)     INTAKE/OUTPUT:  I/O last 3 completed shifts:  In: 2879.1 (34.3 mL/kg) [P.O.:440; I.V.:1129.1 (13.4 mL/kg); Blood:700; NG/GT:110; IV Piggyback:500]  Out: 3562 (42.4 mL/kg) [Other:3362; Stool:200]  Weight: 84 kg        LDA:  ECMO Cannulation Peripheral Right;Femoral artery;Femoral vein (Active)   Placement Date/Time: 03/27/24 1700   ECMO Type: Peripheral  Cannulation Site: Right;Femoral artery;Femoral vein  Line Securement: Line secured in 2 locations;Securement device;Sutures   Number of days: 10       Arterial Line 03/27/24 Right Radial (Active)   Placement Date/Time: 03/27/24 1545   Orientation: Right  Location: Radial   Number of days: 10       Ventricular Assist Device Impella 5.5 (Active)   Placement Date/Time: 03/01/24 1956   Placed by: Dr Viramontes  Hand Hygiene Completed: Yes  Site Location: Axilla right  VAD Type: Left ventricular assist device  VAD Brand: Impella 5.5   Number of days: 36       Hemodialysis Cath 04/06/24 Triple lumen Right Non-tunneled catheter Jugular (Active)   Placement Date/Time: 04/06/24 1500   Hand Hygiene Completed: Yes  Site Prep: Usual sterile procedure followed  Site Prep Agent has Completely Dried Before Insertion: Yes  All 5 Sterile Barriers Used (Gloves, Gown, Cap, Mask, Large Sterile Drape): Yes ...   Number of days: 0     Physical Exam:   - CONSTITUTION: Critically ill on MCS  - NEUROLOGIC: A+O and CAM neg today, generally less confused. following in all 4 extremities. Physically " deconditioned  - CARDIOVASCULAR: ST, R fem VA ECMO, no bleeding at site. R axillary impella, no bleeding at site. No s/s infection at either site. +distal signals in bilateral lower extremities  - RESPIRATORY: Diminished bilateral lung sounds, symmetric chest expansion  - GI: Abdomen soft, non tender, non distended, +bowel sounds.  Diarrhea, FMS in place  - : Anuric, on CVVH via ECMO circuit  - EXTREMITIES: Warm and dry, no peripheral edema  - SKIN: Left femoral skin breakdown with dressing in place  - PSYCHIATRIC: Calm     Images: CXR c/f  pulmonary edema persisting but improving    Invasive Hemodynamics:    Most Recent Range Past 24hrs   BP (Art) 71/58 Arterial Line BP 1  Min: 70/60  Max: 96/83   MAP(Art) 63 mmHg Arterial Line MAP 1 (mmHg)   Min: 62 mmHg  Max: 88 mmHg   RA/CVP   No data recorded   PA 41/34 No data recorded   PA(mean) 37 mmHg No data recorded   CO 5.5 L/min No data recorded   CI 2.8 L/min/m2 No data recorded   Mixed Venous 41.7 % SVO2 (%)  Min: 40 %  Max: 45.2 %   SVR  1115 (dyne*sec)/cm5 No data recorded     ECMO:     Most Recent Range Past 24hrs   Flow 2.58 Patient Flow (L/min)  Min: 2.37  Max: 2.66   Speed 4100 Circuit Flow (RPM)  Min: 3975  Max: 4100   Sweep 4 Sweep Gas Flow Rate (L/min)  Min: 4  Max: 5      Impella:      Most Recent Range Past 24hrs   Performance Level 2 P Level  Min: 1   Min taken time: 04/16/24 0800  Max: 2   Max taken time: 04/16/24 1400   Flow (L/min) 1.4 Flow (L/min)  Min: 1.4   Min taken time: 04/16/24 1400  Max: 1.7   Max taken time: 04/16/24 0800   Motor Current 127/82 Motor Current  Min: 105/66   Min taken time: 04/16/24 0000  Max: 160/85   Max taken time: 04/15/24 1800   Placement Signal Yes  Placement OK could not be evaluated. This SmartLink does not work with rows of the type: Custom List   Purge (mmHg) 450 Purge Pressure (mmHg)  Min: 370   Min taken time: 04/15/24 1700  Max: 540   Max taken time: 04/16/24 1000   Purge rate (mL/hr) 10 Purge Rate (mL/hr)  Min: 10    Min taken time: 04/16/24 1400  Max: 10.5   Max taken time: 04/15/24 1600        Daily Risk Screen:  Dialysis/Hemapheresis    Assessment/Plan   34F with a PMHx sig for stage D HFrEF (PPCM) s/p ICD s/p CardioMEMs, hypothyroidism 2/2 thyroidectomy, obesity s/p gastric bypass (2017), and SLE who is s/p OHT (3/31/2022) with a post-OHT course complicated by RIJ/RUE DVTs, leukopenia, left groin seroma, and asymptomatic 2R ACR (11/2022) treated with steroids, s/p total hysterectomy (2023), Flu A (1/2024).     She presented to [facility] ED on 2/15/24 with complaints of N/V x 3 days and inability to keep medications down. Found to have acute systolic heart failure with primary graft failure and cardiogenic shock. Treated for suspected acute cellular vs. acute antibody mediated rejection; however, multiple cardiac biopsies negative for signs of rejection or other causes of graft failure. Her course has been complicated by multiple MCS devices for refractory cardiogenic shock (right femoral IABP: 2/18/24 - 3/1/24, Left femoral VA ECMO: 2/19/24 - 2/29/24, Right axillary impella 5.5: 3/1/24 - remains in place, 2nd right femoral VA ECMO: 3/27/24 - remains in place), ARF requiring RRT, acute liver injury, intubation due to volume overload in setting of pulmonary edema, severe hemolysis 2/2 to impella malposition, mild CMV viremia, bilateral provoked IJ DVTs, multiple episodes of epistaxis & coagulopathies requiring ongoing blood product transfusions.        Charla was transferred to CTICU on 3/27/24 following right femoral VA ECMO placement due to worsening cardiogenic shock and multi-organ failure despite Impella 5.5 support and multiple inotropes. She was listed Status 1 for heart/kidney on 4/2, however was deactivated (status 7) due to c/f sepsis. Relisted Status 1 4/16. She remains critically ill in the CTICU on MCS with ECMO and an Impella as well as CVVH.       NEURO:  She vacillates between being neuro intact with  intermittent delirium and anxiety though is much improved over last few days. Insomnia supported with multimodals and REST protocol.  CAM negative this AM.  Sitting at side of bed and standing with PT- max assist to get into position but then min assist to maintain. Increased anxiety. Precedex nightly for sleep and intermittently throughout day with anxiety attacks. Additional AM dose of seroquel this AM with agitation.  -->  - Serial neuro and pain assessments  - PRN tylenol  - Continue lidoderm patch for back pain  - PRN oxy 5mg Q6   - Continue melatonin 10 mg HS   - Continue Seroquel 50 mg HS   - May use precedex at bedtime for sleep if needed, can consider clonidine taper with increased requirements and anxiety with cessation in AM if BP will tolerate  - PRN hydroxyzine  - PT/OT Consult; mobilize as able  - CAM ICU score qshift. Sleep/wake cycle hygiene  - REST protocol (CXR and labs after 6am)      ENT: Multiple episodes of epistaxis with interventions by ENT. Most recently in OR 3/27 with L nare packed. Packing removed 4/1. -->  - appreciate ENT recs  - PRN ocean spray and mupiricin for dry nasal passages  - PRN afrin      Cardiac: Patient has a history of heart transplant in March of 2022 with primary graft dysfunction treated for acute cellular and antibody rejection without improvement with negative biopsy with multiple MCS devices for refractory cardiogenic shock (right femoral IABP: 2/18/24 - 3/1/24; Left femoral VA ECMO: 2/19/24 - 2/29/24; Right axillary impella 5.5: 3/1/24 - ; 2nd right femoral VA ECMO: 3/27/24 - ). Elevated LDH and difficulty with flows despite multiple attempts at repositioning Impella previously.   Current ECMO settings ~3L with FiO2 95% and sweep 4; Impella 5.5 P2 with flows 1.5 LPM. LDH increasing. Remains sinus tachy and normotensive. -->  - Maintain goal MAP 70-90  - Continue full BiV support with ECMO  - Continue Impella at P2  - Holding rosuvastatin with mild uptrending in  LFT  - Trend daily LDH   - Hold home amlodipine     Vascular: 3/27 arterial duplex with proximal SFA occlusion with collateral flow. C/f LLE ischemia resolved. Feet warm with intact motor exam. -->     PULM: She has been intubated multiple times throughout hospital course for procedures. Acute respiratory failure persisting due to acute pulmonary edema and CT chest 4/9 with severe multifocal PNA. Cxr improving with some cardiogenic pulmonary edema. Gas exchange augmented by VA ECMO, occasionally on NC for comfort.  -->  - CXR daily  - Adjust sweep for pH 7.3 - 7.5  - Maintain SPO2 > 92%     GI: History of gastric bypass and MMF colitis. Shock liver originally resolved; most recently elevated r/t likely congestive hepatopathy that is improving on ECMO. Abdominal pain improved with reduced myfortic dosing. Previous c/f GI bleed, likely blood from epistaxis; no current evidence of GI bleeding. H pylori negative, fecal calprotectin (elevated at 380), fecal lactoferrin (Positive 03/23/24). Poor nutritional intake leading to vitamin K deficiency and elevated INR s/p Dobhoff placement by ENT 4/8 under fiberoptic visualization. Despite education on benefits of maintaining, NG tube removed 4/15 per patient request. Very poor nutritional intake. Abdominal pain this morning, mild tenderness in left lower quadrant. Dilated loop c/f ileus on KUB. Transiently elevated lactate. -->  - Continue calcitriol 0.5mg daily, multivitamin, calcium carbonate 1,250mg BID  - Continue PPI daily  - Continue regular diet, encourage intake. Likely will need encouragement for TF placement for enteral supplementation  - STAT CT c/a/p noncon to evaluate abd pain  - Continue Ensure Clear TID  - Restart daily miralax     :  No history, baseline Cr 1.2-1.3. HIRAM originally on CVVH then with renal recovery but then again worsened and now dialysis dependent and failed diuretic challenges. Euvolemic. Mild hypophosphatemia.  -->  - RFP as clinically  indicated, replete electrolytes per CTICU protocol.   - Continue CVVH with goal even to negative 500 cc  - sodium phos repletion  - Nephrology Following     ENDO: History of hypothyroidism and prediabetes. TSH elevated originally to malabsorption then with dosing adjusted by endo; TSH (3/17): 20.74, T4 1.11, T3: 1.4, improved 4/1; TSH 10.13, T4 0.70. 4/8: TSH 19.48, T3 0.8, T4 0.68. Steroid induced hyperglycemia acceptable glycemic control on SSI. -->  - Maintain BG <180 with hypoglycemia protocol  - Continue SSI #1 AC/HS   - Continue Synthroid 75 mcg IV daily with concern for malabsorption  - Recheck TFT's 4/23 (ordered)   - Appreciate Endocrine Consult      HEME: History of iron deficiency anemia and remote DVT; again with acute DVT LIJ and SVT left cephalic vein and right cephalic vein. Acute blood loss anemia with repeated transfusions with significant hemolysis but no evidence of active bleeding; last received RBC 4/5. Stable thrombocytopenia persisting; last PF4 negative 3/30. No recent transfusion requirement. Coagulopathy r/t likely poor nutrition resolved with vit K x 3 doses (last 4/10). Heparin infusion and heparin purge, therapeutic heparin assay.  -->  - Continue ferrous sulfate daily  - Avoid unnecessarily transfusing, HGB > 7.0  - Continue systemic heparin with subtherapeutic heparin assay and aptt  - Continue heparin purge in the Impella  - Trend coags daily  - SCDs for DVT prophylaxis  - Aranesp every 2 weeks  - Last type and screen: 4/14     Rejection/prophylaxis: S/p heart transplant 3/31/2022. Induction basiliximab. Donor HCV -, toxo -/-, CMV -/+. Mild ACR with +CD4 and negative HLAs however clinical presentation concern for acute cellular vs AMR rejection/stuttering rejection completed courses of thymo/PLEX/IVIG without clinical improvement.  With consideration to progressive graft failure and concern for infection limiting re-transplant at this time, we are holding tacro and myfortic (4/9 -  ).  - Steroids: Continue PO prednisone 10 mg daily  - Hold Tacrolimus: 4.0 mg AM/3.5 mg PM w/ daily levels drawn @ 0600  - Hold Tacrolimus goal troughs: 8-10  - Hold Myfortic acid: 360mg BID (Not starting MMF d/t hx of colitis)  - Antifungals: nystatin 500,000units Q6  - Antivirals: valcyte 450mg Q48hrs  - Anti PCP & Toxoplasmosis: continue SS Bactrim with c/f PNA     ID: C/f previous yeast infection. BC 3/27 NGTD final. MRSA screen negative 3/28. Episode of hypotension on 4/1, pan cultured and empiric Vanco/Zosyn started.  Patient was shivering 4/3, concern for risk of infection. Blood cultures sent 4/3, NGTD. Zosyn (4/1- 4/7) then broadened to Susan (4/7 - 4/15) and vanco (4/6-4/15) and natalie started (4/7 - 4/12). CT chest 4/9 demonstrates severe multifocal PNA. UA culture with enterococcus but difficult to define as infection given anuric state.  Beta-D glucan mildly elevated 195 thought to be 2/2 recent IVIG though could also be indicative of PJP. Persistent leukocytosis without clear source if infection. Cultures negative.  -->  - Trend temp q4h  - Holding antibiotics for now  - Follow up culture results  linezolid for VRE with ID--> do not treat per ID. Recommending repeat UA but no urine to send.  - Unable to obtain better sputum sample and avoiding NG placement with epistaxis history. Discussing benefit/risk of intubating and bronching for sample vs empirically treating with treatment dose of bactrim --> recommending not to empirically treat for PJP and consider bronch for better sample. Concern about intubating for bronch and potential for being able to extubated.    Skin: Left groin wound from previous ECMO with wound consulted. Wound cx with NG 3/15.   - Preventative Mepilex dressings in place on sacrum and heels  - Change preventative Mepilex weekly or more frequently as indicated (when moist/soiled)   - Every shift skin assessment per nursing and weekly ICU skin rounds  - Moisture barrier to be applied  with christopher care  - Active skin problems addressed with nursing on daily rounds  - wound recs from note 3/15 ordered      Proph:  SCDs  Systemic heparin & heparin purge   PPI     G: Lines  RIJ Trialysis - 4/6  R radial maxwell 4/11  PIV x2    Restraints: Not indicated    Code status: Full Code    I personally spent 45 minutes of critical care time directly and personally managing the patient exclusive of separately billable procedures.     A,B,C,D,E,F,G: reviewed.    A&P reviewed on multidisciplinary critical care rounds. Plan for multidisciplinary discussion today to discuss trajectory.      Dispo: CTICU care for now.    CTICU TEAM PHONE [phone]

## 2024-04-16 NOTE — PROGRESS NOTES
Occupational Therapy    Occupational Therapy Treatment    Name: Charla Bowles  MRN: [id number]  : 1990  Date: 24  Room:       Time Calculation  Start Time: 1426  Stop Time: 1509  Time Calculation (min): 43 min    Assessment:  End of Session Communication: Bedside nurse  End of Session Patient Position: Bed, 3 rail up, Alarm off, not on at start of session    Plan:  Treatment Interventions: ADL retraining, Functional transfer training, Endurance training, UE strengthening/ROM, Cognitive reorientation, Patient/family training, Equipment evaluation/education, Neuromuscular reeducation, Fine motor coordination activities, Compensatory technique education  OT Frequency: 5 times per week  OT Discharge Recommendations: High intensity level of continued care  Equipment Recommended upon Discharge:  (TBD)  OT Recommended Transfer Status: Assist of 2  OT - OK to Discharge: Yes    Subjective   General:  OT Last Visit  OT Received On: 24  Co-Treatment: PT  Co-Treatment Reason: Pt on ECMO requiring skilled 2 person assist for safe mobility  Prior to Session Communication: Bedside nurse (NP)  Patient Position Received: Bed, 3 rail up, Alarm off, not on at start of session  General Comment: Pt supine in bed, drowsy but agreeable. R femoral ECMO flow 2.56 (post 2.59), 95% FiO2, sweep 5, R axillary impella (P2) flow 1.4 (post 1.4), CVVH through ECMO. ECMO and impella examined pre, during and post mobility, stable and secure.     Precautions:  Hearing/Visual Limitations: WFL  Medical Precautions: Cardiac precautions, Fall precautions  Precautions Comment: R axillary impella precautions- no pushing/pulling with R UE, No lifting R UE above shoulder height, no R UE humeral EXT past plane of body, R femoral ECMO precautions, MAP 70-90, protective precautions    Vitals:  Vital Signs  Heart Rate: 106 (Post: 81)  Resp: (!) 27  BP: 89/73 (post: 83/64)  MAP (mmHg): 79 (post: 72)  Lines/Tubes/Drains:  ECMO  Cannulation Peripheral Right;Femoral artery;Femoral vein (Active)   Number of days: 19       Arterial Line 04/11/24 Right Radial (Active)   Number of days: 4       Ventricular Assist Device Impella 5.5 (Active)   Number of days: 45       Hemodialysis Cath 04/06/24 Triple lumen Right Non-tunneled catheter Jugular (Active)   Number of days: 10       Cognition:  Overall Cognitive Status: Impaired  Arousal/Alertness: Delayed responses to stimuli  Orientation Level:  (oriented x3)  Following Commands: Follows one step commands with increased time (and repetition)  Cognition Comments: CAM-ICU (+), very drowsy this date. Self directed with all tasks  Processing Speed: Delayed    Pain Assessment:  Pain Assessment  Pain Assessment:  (back pain, not quantified)     Objective   Activities of Daily Living:        Grooming  Grooming Comments: able to bring Yankauer to mouth with SBA      Bed Mobility/Transfers:     Bed Mobility  Bed Mobility: Yes  Bed Mobility 1  Bed Mobility 1: Supine to sitting, Sitting to supine  Level of Assistance 1: Dependent (x2)  Bed Mobility Comments 1: HOB elevated    Transfers  Transfer: Yes  Transfer 1  Transfer From 1: Sit to  Transfer to 1: Stand  Trials/Comments 1: x3 trials; Trial 1: mod A x2, Trials 2&3: max A x2        Balance:  Static Sitting Balance  Static Sitting-Level of Assistance: Close supervision  Static Sitting-Comment/Number of Minutes: intermittent excessive forward lean noted     Therapy/Activity:      Therapeutic Activity  Therapeutic Activity Performed: Yes  Therapeutic Activity 1: Pt sat EOB ~25 minutes with SBA  Therapeutic Activity 2: Pt tolerated 3 standing trials for a cumulative time of 7 minutes 30 seconds. min A x2 provided  Therapeutic Activity 3: With max A x2, pt performed x1 side step with each foot. Increased time difficulty stepping with RLE     Outcome Measures:  [facility] Daily Activity  Putting on and taking off regular lower body clothing: Total  Bathing (including  washing, rinsing, drying): A lot  Putting on and taking off regular upper body clothing: A lot  Toileting, which includes using toilet, bedpan or urinal: Total  Taking care of personal grooming such as brushing teeth: A little  Eating Meals: A little  Daily Activity - Total Score: 12     Education Documentation  Handouts, taught by Marcia Thomason OT at 4/16/2024  3:20 PM.  Learner: Patient  Readiness: Acceptance  Method: Explanation, Demonstration  Response: Verbalizes Understanding    Body Mechanics, taught by Marcia Thomason OT at 4/16/2024  3:20 PM.  Learner: Patient  Readiness: Acceptance  Method: Explanation, Demonstration  Response: Verbalizes Understanding    Precautions, taught by Marcia Thomason OT at 4/16/2024  3:20 PM.  Learner: Patient  Readiness: Acceptance  Method: Explanation, Demonstration  Response: Verbalizes Understanding    Home Exercise Program, taught by Marcia Thomason OT at 4/16/2024  3:20 PM.  Learner: Patient  Readiness: Acceptance  Method: Explanation, Demonstration  Response: Verbalizes Understanding    ADL Training, taught by Marcia Thomason OT at 4/16/2024  3:20 PM.  Learner: Patient  Readiness: Acceptance  Method: Explanation, Demonstration  Response: Verbalizes Understanding    Education Comments  No comments found.        Goals:  Encounter Problems       Encounter Problems (Active)       ADLs       Patient will complete daily grooming tasks in standing with LRAD with supervision level of assistance and PRN adaptive equipment. (Progressing)       Start:  03/01/24    Expected End:  04/30/24               ADLs       Patient with complete lower body dressing with stand by assist level of assistance donning and doffing all LE clothes  with PRN adaptive equipment (Not met)       Start:  03/04/24    Expected End:  03/18/24    Resolved:  04/02/24    Updated to: Patient with complete UB bathing with stand by assist level of assistance  with  PRN adaptive equipment    Update reason: re eval          Patient will complete toileting including hygiene clothing management/hygiene with stand by assist level of assistance. (Not met)       Start:  03/04/24    Expected End:  03/18/24    Resolved:  04/02/24    Updated to: Patient will complete upper body dressing including with mod I level of assistance.    Update reason: re eval         Patient with complete UB bathing with stand by assist level of assistance  with PRN adaptive equipment (Progressing)       Start:  04/02/24    Expected End:  04/30/24                Patient will complete upper body dressing including with mod I level of assistance. (Progressing)       Start:  04/02/24    Expected End:  04/30/24                   BALANCE       Pt will increase dynamic standing tolerance to >10 min with supervision using LRAD during functional mobility/ADLs without LOB in order to improve activity tolerance and balance for self-care tasks.  (Progressing)       Start:  03/01/24    Expected End:  04/30/24               COGNITION/SAFETY       Patient will participate in cognitive activities to demonstrate WFL score on further cognitive assessments, including Medi-Cog, MoCA and remain A&O x3, CAM (-).  (Progressing)       Start:  03/01/24    Expected End:  04/30/24               EXERCISE/STRENGTHENING       Patient will be educated on BUE HEP for increased ADL/IADL performance. (Progressing)       Start:  03/01/24    Expected End:  04/30/24               MOBILITY       Patient will perform Functional mobility max Household distances/Community Distances with supervision level of assistance and least restrictive device in order to improve safety and functional mobility. (Progressing)       Start:  03/01/24    Expected End:  04/30/24 04/16/24 at 3:20 PM   Marcia Thomason, OT   [phone]

## 2024-04-16 NOTE — PROGRESS NOTES
"4/16 Review:   On 3/27 @ los 41 returned to CTICU for \" requiring escalation to mechanical circulatory support today with re-initiation of VA ECMO.\" Supported by Impella 5.5. ESRD & on dialysis. On 4/1 extubated. Committee: \"Has met criteria for kidney transplant listing s/p RRT x 6 weeks as of 3/29/24. Abdominal Transplant team will formally approve for kidney transplantation pending approval for heart transplantation.  - Heart Transplant committee  formally approved for heart transplantation as status 1 on 4/2/24. On 4/6 status for heart/kidenty changed from 1 to a 7 d/t pneumonia. Now re-listed as UNOS status 1 for heart & kidney.     Claribel Diaz (LSW, MSW)     "

## 2024-04-16 NOTE — PROGRESS NOTES
[facility]  ADVANCED HEART FAILURE AND TRANSPLANT CARDIOLOGY   Charla Bowles/[id number]    Admit Date: 2/15/2024  Hospital Length of Stay: 61   ICU Length of Stay: 19d 15h   Primary Service: CTICU    Charla Bowles is a 34 y.o. female on day 61 of admission presenting with Cardiogenic shock (Multi).    Subjective   Charla appears visibly uncomfortable this morning, complaining of abdominal pain and stating she needs to sit up so that she can pass gas and relieve the pressure. She also requests zofran for nausea and hydromorphone for pain. Difficult to maintain a conversation as she becomes distracted by her discomfort.     Objective     Physical Exam  Constitutional:       Appearance: She is ill-appearing.   HENT:      Head: Normocephalic.      Comments: Not actively bleeding      Mouth/Throat:      Mouth: Mucous membranes are moist.      Pharynx: Oropharynx is clear. No oropharyngeal exudate or posterior oropharyngeal erythema.   Eyes:      Extraocular Movements: Extraocular movements intact.      Conjunctiva/sclera: Conjunctivae normal.      Pupils: Pupils are equal, round, and reactive to light.   Neck:      Vascular: No JVD.   Cardiovascular:      Rate and Rhythm: Tachycardia present.      Comments: Right axillary impella in place ~45cm at hub (no hematoma), Right femoral VA ECMO in place with reperfusion catheter (site appears clean and dry). Dopplerable radial and ulnar pulses bilaterally. Dopplerable PT pulses bilateral. Unable to doppler DP bilaterally.  Pulmonary:      Effort: No accessory muscle usage, respiratory distress or retractions.      Breath sounds: Normal breath sounds. No wheezing, rhonchi or rales.      Comments: RA. Tachypnea w/ RR 20-30. Small amount of blood tinged and purulent sputum.  Abdominal:      General: Abdomen is flat. Bowel sounds are normal. There is no distension.      Palpations: Abdomen is soft. There is no mass.      Hernia: No hernia  "is present.   Musculoskeletal:         General: No swelling (+1 BLE edema), tenderness or signs of injury. Normal range of motion.      Cervical back: Full passive range of motion without pain.   Skin:     General: Skin is dry.      Capillary Refill: Capillary refill takes less than 2 seconds.      Coloration: Skin is not jaundiced.      Findings: Bruising and lesion (Left groin wound with slough.) present. No erythema, laceration, rash or wound.   Neurological:      General: No focal deficit present.      Mental Status: She is alert. She is disoriented.      Comments: A/Ox2 (confused to date/time).    Psychiatric:         Mood and Affect: Mood is not anxious.      Comments: Anxious     5  Last Recorded Vitals  Blood pressure 78/63, pulse (!) 111, temperature 36.7 °C (98.1 °F), temperature source Temporal, resp. rate (!) 29, height 1.549 m (5' 0.98\"), weight 84 kg (185 lb 3 oz), SpO2 100%.  Intake/Output last 3 Shifts:  I/O last 3 completed shifts:  In: 2879.1 (34.3 mL/kg) [P.O.:440; I.V.:1129.1 (13.4 mL/kg); Blood:700; NG/GT:110; IV Piggyback:500]  Out: 3562 (42.4 mL/kg) [Other:3362; Stool:200]  Weight: 84 kg     Relevant Results  Scheduled medications  calcium carbonate-vitamin D3, 1 tablet, oral, Daily  cyanocobalamin, 500 mcg, oral, Daily  darbepoetin floyd, 100 mcg, subcutaneous, q14 days  ferrous sulfate (325 mg ferrous sulfate), 65 mg of iron, oral, Daily with breakfast  folic acid, 1 mg, oral, Daily  insulin lispro, 0-15 Units, subcutaneous, TID with meals  levothyroxine, 250 mcg, oral, Daily  lidocaine, 1 patch, transdermal, Daily  melatonin, 10 mg, oral, Daily  multivitamin with minerals, 1 tablet, oral, Daily  nystatin, 5 mL, Swish & Swallow, q6h  oxygen, , inhalation, Continuous - Inhalation  pantoprazole, 40 mg, intravenous, Daily  polyethylene glycol, 17 g, oral, Daily  potassium, sodium phosphates, 1 packet, oral, 4x daily  predniSONE, 10 mg, oral, Daily  QUEtiapine, 50 mg, oral, Nightly  [Held by " provider] rosuvastatin, 10 mg, oral, Nightly  sennosides-docusate sodium, 2 tablet, oral, BID  sulfamethoxazole-trimethoprim, 80 mg of trimethoprim, oral, Daily  valGANciclovir, 450 mg, oral, q48h      Continuous medications  dexmedeTOMIDine, 0.1-1.5 mcg/kg/hr, Last Rate: Stopped (04/16/24 0400)  heparin, 0-4,000 Units/hr, Last Rate: 600 Units/hr (04/16/24 0900)  heparin Impella Purge 25 units/mL in 500 mL D5W, 10 mL/hr, Last Rate: 10 mL/hr (04/16/24 0900)  lactated Ringer's, 5 mL/hr, Last Rate: 5 mL/hr (04/16/24 0900)  PrismaSol 4/2.5, 2,400 mL/hr, Last Rate: 2,400 mL/hr (04/13/24 1700)      PRN medications  PRN medications: acetaminophen **OR** [DISCONTINUED] acetaminophen, alteplase, bisacodyl, calcium gluconate, calcium gluconate, dextrose **OR** glucagon, hydrALAZINE, hydrOXYzine HCL, ipratropium-albuteroL, artificial tears, magnesium sulfate, magnesium sulfate, microfibrllar collagen, mupirocin, naloxone, ondansetron, oxyCODONE, sodium chloride     Results for orders placed or performed during the hospital encounter of 02/15/24 (from the past 24 hour(s))   Calcium, ionized   Result Value Ref Range    POCT Calcium, Ionized 1.16 1.1 - 1.33 mmol/L   Magnesium   Result Value Ref Range    Magnesium 2.88 (H) 1.60 - 2.40 mg/dL   CBC   Result Value Ref Range    WBC 24.1 (H) 4.4 - 11.3 x10*3/uL    nRBC 14.9 (H) 0.0 - 0.0 /100 WBCs    RBC 2.62 (L) 4.00 - 5.20 x10*6/uL    Hemoglobin 7.9 (L) 12.0 - 16.0 g/dL    Hematocrit 23.2 (L) 36.0 - 46.0 %    MCV 89 80 - 100 fL    MCH 30.2 26.0 - 34.0 pg    MCHC 34.1 32.0 - 36.0 g/dL    RDW 20.2 (H) 11.5 - 14.5 %    Platelets 128 (L) 150 - 450 x10*3/uL   Renal Function Panel   Result Value Ref Range    Glucose 189 (H) 74 - 99 mg/dL    Sodium 135 (L) 136 - 145 mmol/L    Potassium 4.2 3.5 - 5.3 mmol/L    Chloride 96 (L) 98 - 107 mmol/L    Bicarbonate 26 21 - 32 mmol/L    Anion Gap 17 10 - 20 mmol/L    Urea Nitrogen 18 6 - 23 mg/dL    Creatinine 0.56 0.50 - 1.05 mg/dL    eGFR >90 >60  mL/min/1.73m*2    Calcium 9.0 8.6 - 10.6 mg/dL    Phosphorus 2.6 2.5 - 4.9 mg/dL    Albumin 4.5 3.4 - 5.0 g/dL   Blood Gas Arterial Full Panel   Result Value Ref Range    POCT pH, Arterial 7.36 (L) 7.38 - 7.42 pH    POCT pCO2, Arterial 42 38 - 42 mm Hg    POCT pO2, Arterial 177 (H) 85 - 95 mm Hg    POCT SO2, Arterial 100 94 - 100 %    POCT Oxy Hemoglobin, Arterial 94.4 94.0 - 98.0 %    POCT Hematocrit Calculated, Arterial 25.0 (L) 36.0 - 46.0 %    POCT Sodium, Arterial 132 (L) 136 - 145 mmol/L    POCT Potassium, Arterial 4.6 3.5 - 5.3 mmol/L    POCT Chloride, Arterial 100 98 - 107 mmol/L    POCT Ionized Calcium, Arterial 1.15 1.10 - 1.33 mmol/L    POCT Glucose, Arterial 207 (H) 74 - 99 mg/dL    POCT Lactate, Arterial 1.2 0.4 - 2.0 mmol/L    POCT Base Excess, Arterial -1.7 -2.0 - 3.0 mmol/L    POCT HCO3 Calculated, Arterial 23.7 22.0 - 26.0 mmol/L    POCT Hemoglobin, Arterial 8.2 (L) 12.0 - 16.0 g/dL    POCT Anion Gap, Arterial 13 10 - 25 mmo/L    Patient Temperature 37.0 degrees Celsius    FiO2 21 %   POCT GLUCOSE   Result Value Ref Range    POCT Glucose 199 (H) 74 - 99 mg/dL   Heparin Assay, UFH   Result Value Ref Range    Heparin Unfractionated 0.7 See Comment Below for Therapeutic Ranges IU/mL   ECMO ARTERIAL FULL PANEL   Result Value Ref Range    POCT pH, Arterial ECMO 7.46 Reference range not established pH    POCT pCO2, Arterial ECMO 34 Reference range not established mm Hg    POCT pO2,  Arterial ECMO 359 Reference range not established mm Hg    POCT SO2, Arterial ECMO 100 Reference range not established %    POCT Oxy Hemoglobin, Arterial ECMO 94.0 Reference range not established %    POCT Hematocrit Calculated, Arterial ECMO 31.0 (L) 36.0 - 46.0 %    POCT Sodium, Arterial  ECMO 134 (L) 136 - 145 mmol/L    POCT Potassium, Arterial  ECMO 4.6 3.5 - 5.3 mmol/L    POCT Chloride, Arterial  ECMO 101 98 - 107 mmol/L    POCT Ionized Calcium, Arterial  ECMO 1.01 (L) 1.10 - 1.33 mmol/L    POCT Glucose, Arterial  ECMO  102 (H) 74 - 99 mg/dL    POCT Lactate Arterial  ECMO 1.3 0.4 - 2.0 mmol/L    POCT Base Excess, Arterial ECMO 0.6 Reference range not established mmol/L    POCT HCO3 Calculated, Arterial ECMO 24.2 Reference range not established mmol/L    POCT Hemoglobin, Arterial  ECMO 10.2 (L) 12.0 - 16.0 g/dL    POCT Anion Gap, Arterial  ECMO 13 Reference range not established mmo/L    Patient Temperature 37.0 degrees Celsius    FiO2 100 %   Blood Gas Arterial Full Panel   Result Value Ref Range    POCT pH, Arterial 7.46 (H) 7.38 - 7.42 pH    POCT pCO2, Arterial 35 (L) 38 - 42 mm Hg    POCT pO2, Arterial 227 (H) 85 - 95 mm Hg    POCT SO2, Arterial 100 94 - 100 %    POCT Oxy Hemoglobin, Arterial 94.3 94.0 - 98.0 %    POCT Hematocrit Calculated, Arterial 34.0 (L) 36.0 - 46.0 %    POCT Sodium, Arterial 132 (L) 136 - 145 mmol/L    POCT Potassium, Arterial 4.7 3.5 - 5.3 mmol/L    POCT Chloride, Arterial 101 98 - 107 mmol/L    POCT Ionized Calcium, Arterial 1.07 (L) 1.10 - 1.33 mmol/L    POCT Glucose, Arterial 108 (H) 74 - 99 mg/dL    POCT Lactate, Arterial 1.5 0.4 - 2.0 mmol/L    POCT Base Excess, Arterial 1.3 -2.0 - 3.0 mmol/L    POCT HCO3 Calculated, Arterial 24.9 22.0 - 26.0 mmol/L    POCT Hemoglobin, Arterial 11.2 (L) 12.0 - 16.0 g/dL    POCT Anion Gap, Arterial 11 10 - 25 mmo/L    Patient Temperature 37.0 degrees Celsius    FiO2 21 %   Calcium, ionized   Result Value Ref Range    POCT Calcium, Ionized 1.07 (L) 1.1 - 1.33 mmol/L   Heparin Assay, UFH   Result Value Ref Range    Heparin Unfractionated 0.6 See Comment Below for Therapeutic Ranges IU/mL   Coagulation Screen   Result Value Ref Range    Protime 14.6 (H) 9.8 - 12.8 seconds    INR 1.3 (H) 0.9 - 1.1    aPTT 71 (H) 27 - 38 seconds   ECMO VENOUS FULL PANEL   Result Value Ref Range    POCT pH, Venous ECMO 7.41 Reference range not established pH    POCT pCO2, Venous ECMO 41 Reference range not established mm Hg    POCT pO2,  Venous  ECMO 21 Reference range not established mm Hg     POCT SO2, Venous ECMO 41 Reference range not established %    POCT Oxy Hemoglobin, Venous ECMO 38.1 Reference range not established %    POCT Hematocrit Calculated, Venous ECMO 29.0 (L) 36.0 - 46.0 %    POCT Sodium, Venous ECMO 130 (L) 136 - 145 mmol/L    POCT Potassium, Venous ECMO 4.4 3.5 - 5.3 mmol/L    POCT Chloride, Venous ECMO 99 98 - 107 mmol/L    POCT Ionized Calcium, Venous ECMO 1.08 (L) 1.10 - 1.33 mmol/L    POCT Glucose, Venous ECMO 102 (H) 74 - 99 mg/dL    POCT Lactate Venous ECMO 1.3 0.4 - 2.0 mmol/L    POCT Base Excess, Venous ECMO 1.2 Reference range not established mmol/L    POCT HCO3 Calculated, Venous ECMO 26.0 Reference range not established mmol/L    POCT Hemoglobin, Venous ECMO 9.7 (L) 12.0 - 16.0 g/dL    POCT Anion Gap, Venous ECMO 9 Reference range not established mmo/L    Patient Temperature 37.0 degrees Celsius    FiO2 85 %   Reticulocytes   Result Value Ref Range    Retic % 3.8 (H) 0.5 - 2.0 %    Retic Absolute 0.083 0.018 - 0.083 x10*6/uL    Reticulocyte Hemoglobin 33 28 - 38 pg    Immature Retic fraction 34.0 (H) <=16.0 %   Haptoglobin   Result Value Ref Range    Haptoglobin 12 (L) 37 - 246 mg/dL   CBC and Auto Differential   Result Value Ref Range    WBC 23.4 (H) 4.4 - 11.3 x10*3/uL    nRBC 16.0 (H) 0.0 - 0.0 /100 WBCs    RBC 2.21 (L) 4.00 - 5.20 x10*6/uL    Hemoglobin 6.8 (L) 12.0 - 16.0 g/dL    Hematocrit 18.8 (L) 36.0 - 46.0 %    MCV 85 80 - 100 fL    MCH 30.8 26.0 - 34.0 pg    MCHC 36.2 (H) 32.0 - 36.0 g/dL    RDW 19.8 (H) 11.5 - 14.5 %    Platelets 65 (L) 150 - 450 x10*3/uL    Immature Granulocytes %, Automated 9.4 (H) 0.0 - 0.9 %    Immature Granulocytes Absolute, Automated 2.21 (H) 0.00 - 0.70 x10*3/uL   Magnesium   Result Value Ref Range    Magnesium 2.96 (H) 1.60 - 2.40 mg/dL   Renal Function Panel   Result Value Ref Range    Glucose 101 (H) 74 - 99 mg/dL    Sodium 133 (L) 136 - 145 mmol/L    Potassium 4.4 3.5 - 5.3 mmol/L    Chloride 98 98 - 107 mmol/L    Bicarbonate 27 21 -  32 mmol/L    Anion Gap 12 10 - 20 mmol/L    Urea Nitrogen 19 6 - 23 mg/dL    Creatinine 0.61 0.50 - 1.05 mg/dL    eGFR >90 >60 mL/min/1.73m*2    Calcium 8.5 (L) 8.6 - 10.6 mg/dL    Phosphorus 1.8 (L) 2.5 - 4.9 mg/dL    Albumin 4.4 3.4 - 5.0 g/dL   Manual Differential   Result Value Ref Range    Neutrophils %, Manual 84.7 40.0 - 80.0 %    Lymphocytes %, Manual 7.2 13.0 - 44.0 %    Monocytes %, Manual 3.6 2.0 - 10.0 %    Eosinophils %, Manual 0.0 0.0 - 6.0 %    Basophils %, Manual 0.0 0.0 - 2.0 %    Myelocytes %, Manual 4.5 0.0 - 0.0 %    Seg Neutrophils Absolute, Manual 19.82 (H) 1.20 - 7.00 x10*3/uL    Lymphocytes Absolute, Manual 1.68 1.20 - 4.80 x10*3/uL    Monocytes Absolute, Manual 0.84 0.10 - 1.00 x10*3/uL    Eosinophils Absolute, Manual 0.00 0.00 - 0.70 x10*3/uL    Basophils Absolute, Manual 0.00 0.00 - 0.10 x10*3/uL    Myelocytes Absolute, Manual 1.05 0.00 - 0.00 x10*3/uL    Total Cells Counted 111     RBC Morphology See Below     Polychromasia Mild     Hypochromia Mild     RBC Fragments Few    Lactate Dehydrogenase   Result Value Ref Range    LDH 3,424 (H) 84 - 246 U/L   T4, free   Result Value Ref Range    Thyroxine, Free 0.68 (L) 0.78 - 1.48 ng/dL   TSH with reflex to Free T4 if abnormal   Result Value Ref Range    Thyroid Stimulating Hormone 21.57 (H) 0.44 - 3.98 mIU/L   T3, free   Result Value Ref Range    Triiodothyronine, Free 0.8 (L) 2.3 - 4.2 pg/mL   Blood Gas Arterial Full Panel   Result Value Ref Range    POCT pH, Arterial 7.44 (H) 7.38 - 7.42 pH    POCT pCO2, Arterial 35 (L) 38 - 42 mm Hg    POCT pO2, Arterial 76 (L) 85 - 95 mm Hg    POCT SO2, Arterial 100 94 - 100 %    POCT Oxy Hemoglobin, Arterial 91.7 (L) 94.0 - 98.0 %    POCT Hematocrit Calculated, Arterial 21.0 (L) 36.0 - 46.0 %    POCT Sodium, Arterial 133 (L) 136 - 145 mmol/L    POCT Potassium, Arterial 4.6 3.5 - 5.3 mmol/L    POCT Chloride, Arterial 102 98 - 107 mmol/L    POCT Ionized Calcium, Arterial 1.10 1.10 - 1.33 mmol/L    POCT  Glucose, Arterial      POCT Lactate, Arterial 1.5 0.4 - 2.0 mmol/L    POCT Base Excess, Arterial -0.3 -2.0 - 3.0 mmol/L    POCT HCO3 Calculated, Arterial 23.8 22.0 - 26.0 mmol/L    POCT Hemoglobin, Arterial 7.1 (L) 12.0 - 16.0 g/dL    POCT Anion Gap, Arterial 12 10 - 25 mmo/L    Patient Temperature 37.0 degrees Celsius    FiO2 21 %   Heparin Assay, UFH   Result Value Ref Range    Heparin Unfractionated 0.7 See Comment Below for Therapeutic Ranges IU/mL   POCT GLUCOSE   Result Value Ref Range    POCT Glucose 149 (H) 74 - 99 mg/dL   Heparin Assay, UFH   Result Value Ref Range    Heparin Unfractionated 0.5 See Comment Below for Therapeutic Ranges IU/mL   CBC   Result Value Ref Range    WBC 24.1 (H) 4.4 - 11.3 x10*3/uL    nRBC 20.1 (H) 0.0 - 0.0 /100 WBCs    RBC 2.41 (L) 4.00 - 5.20 x10*6/uL    Hemoglobin 7.4 (L) 12.0 - 16.0 g/dL    Hematocrit 20.8 (L) 36.0 - 46.0 %    MCV 86 80 - 100 fL    MCH 30.7 26.0 - 34.0 pg    MCHC 35.6 32.0 - 36.0 g/dL    RDW 18.6 (H) 11.5 - 14.5 %    Platelets 103 (L) 150 - 450 x10*3/uL   Blood Gas Arterial Full Panel   Result Value Ref Range    POCT pH, Arterial 7.38 7.38 - 7.42 pH    POCT pCO2, Arterial 35 (L) 38 - 42 mm Hg    POCT pO2, Arterial 129 (H) 85 - 95 mm Hg    POCT SO2, Arterial 100 94 - 100 %    POCT Oxy Hemoglobin, Arterial 93.9 (L) 94.0 - 98.0 %    POCT Hematocrit Calculated, Arterial 38.0 36.0 - 46.0 %    POCT Sodium, Arterial 131 (L) 136 - 145 mmol/L    POCT Potassium, Arterial 4.3 3.5 - 5.3 mmol/L    POCT Chloride, Arterial 98 98 - 107 mmol/L    POCT Ionized Calcium, Arterial 1.10 1.10 - 1.33 mmol/L    POCT Glucose, Arterial 173 (H) 74 - 99 mg/dL    POCT Lactate, Arterial 4.3 (HH) 0.4 - 2.0 mmol/L    POCT Base Excess, Arterial -3.8 (L) -2.0 - 3.0 mmol/L    POCT HCO3 Calculated, Arterial 20.7 (L) 22.0 - 26.0 mmol/L    POCT Hemoglobin, Arterial 12.6 12.0 - 16.0 g/dL    POCT Anion Gap, Arterial 17 10 - 25 mmo/L    Patient Temperature 37.0 degrees Celsius    FiO2 0 %     *Note:  Due to a large number of results and/or encounters for the requested time period, some results have not been displayed. A complete set of results can be found in Results Review.        Malnutrition Diagnosis Status: Ongoing  Malnutrition Diagnosis: Moderate malnutrition related to acute disease or injury  As Evidenced by: pt's reported intake likely meeting <75% of estimated needs for at least 7 days, previous 5% weight loss in 1 month, LOS 42.  I agree with the dietitian's malnutrition diagnosis.      Assessment/Plan   Ms. Yimi Bowles is a 34F with a PMHx sig for stage D HFrEF (PPCM) s/p ICD s/p CardioMEMs, hypothyroidism 2/2 thyroidectomy, obesity s/p gastric bypass (2017), and SLE who is s/p OHT (3/31/2022) with a post-OHT course complicated by RIJ/RUE DVTs, leukopenia, left groin seroma, and asymptomatic 2R ACR (11/2022) treated with steroids, s/p total hysterectomy (2023), Flu A (1/2024).     Originally presented to [facility] ED on 2/15/24 with complaints of N/V x 3 days and inability to keep medications down. Found to have acute systolic heart failure with primary graft failure and cardiogenic shock. Treated for suspected acute cellular vs. acute antibody mediated rejection; however, multiple cardiac biopsies negative for pathology indicating rejection or other causes of graft failure. Course has been complicated by multiple MCS devices for refractory cardiogenic shock (right femoral IABP: 2/18/24 - 3/1/24, Left femoral VA ECMO: 2/19/24 - 2/29/24, Right axillary impella 5.5: 3/1/24 - remains in place, 2nd right femoral VA ECMO: 3/27/24 - remains in place), ARF requiring RRT, acute liver injury, intubation due to hypoxic respiratory failure, severe hemolysis 2/2 to impella malposition, mild CMV viremia, bilateral provoked IJ DVTs, multiple episodes of epistaxis & coagulopathies requiring ongoing blood product transfusions, & HCAP.       Transferred to CTICU on 3/27/24 following right femoral VA ECMO placement due  to worsening cardiogenic shock and multi-organ failure despite Impella 5.5 support and multiple inotropes. Attempting for retransplantation of heart and new kidney transplantation.     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  #Acute renal failure requiring RRT   #Acute transaminitis  #Coagulopathy  #Thrombocytopenia   #Anemia   #Provoked bilateral IJ DVTs    #Left SFA occlusion   #Malnutrition  #Delirium/Anxiety  #HCAP       Donor/Recipient Infectious history:  CMV: -/+ (low grade CMV viremia w/ levels < 35 on 3/1/24, repeat CMV levels remain negative since 4/7/24)  Toxo: -/-   Hep C: -/-     Rejection/Prophylaxis (transplants):  - Steroids: Continue 10mg PO prednisone daily (risk for adrenal insufficiency if stopped)  - Tacrolimus: stopped on 4/9/24 due to infection   - Myfortic acid: stopped on 4/9/24 due to infection  - Antifungals: nystatin 500,000units Q6  - Antivirals: valcyte 450mg Q48hrs   - Anti PCP & Toxoplasmosis: bactrim 200-40mg daily      Last cardiac biopsy: 2/16/24 with ACR grade 1R and no AMR (extremely low C4d+ detected), 2/20/24 negative for ACR and AMR  Last HLA: 4/2/24 negative for class 1 or 2 antibodies   Last RHC: closing numbers on 3/16/24 /86(97) RA 20, PAP 40/33(37), C.O./C.I. 5.5/2.8, PVR 88, SVR 1115   Last LHC: 2/18/24 negative for CAV and CAD   Last TTE/BOB: 4/16/24 shows severe LVEF w/ global hypokinesis, mild RV dysfunction, mild-mod AR, mod MR, mild TR and impella angled posteriorly   Osteopenia/osteoporosis prophylaxis: Vitamin D3 currently held. Continue calcium supplements  Peptic/gastric ulcer prophylaxis: Pantoprazole 40mg daily  CAV Prophylaxis: Holding Aspirin 81mg due to continued thrombocytopenia. Holding rosuvastatin due to transaminases.      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 Broad differential including SLE flair, giant cell vasculitis, CAV/CAD, viral  cardiomyopathy, sarcoidosis, amyloidosis and allograft rejection amongst many others. 2xallograft biopsies on 2/16 & 2/20 remain negative for any significant pathology. Notably, both biopsies taken after rejection therapies implemented which may have reduced areas of graft damage.    - DSAs remain negative; however, patient may have non-HLA antibodies present. 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   - Negative CAV & CAD via LHC on 2/18/24   - Completed methylpred steroid pulse w/ 1g Q24 x 3 days (2/16-2/18) and prednisone taper   - Thymoglobulin doses: 2/18 & 2/19   - IVIG + PLEX Session: 2/18, 2/20, 3/7, 3/11 and 3/13    - Right femoral VA ECMO flowing 2.5L w/ FIO2 85% and sweep 4  - Right axillary impella for LV venting remains in place and set P2 w/ flows of 1.0  - Impella remains poorly positioned and appears to be in contact with mitral valve leaflets. LDH continues to rise indicating worsening hemolysis. On going transfusion requirements.   - LFTs remain elevated but stable  - Originally failed swallow eval on 4/10/24. ENT placed dobhoff w/ tubefeeds for nutritional support; however, patient requested removal on 4/15. Passed formal swallow eval on 4/15.    - Intermittently delirious and anxious  - Sitting at edge of bed and standing daily w/ PT/OT & CTICU team assistance   - CT scan on 4/9/24 shows bilateral pulmonary infiltrates throughout lung fields, L>R, confirming HCAP. Unable to obtain adequate respiratory culture.   - UC from 4/9 also growing VRE; however, not believed to be infectious   - Chest x-ray shows improvement in pulmonary fields; however, ventilation remains poor, requiring sweep of 4.   - Recent antimicrobials: micafungin 4/7-4/12, meropenem and vanc 4/7-4/15. Unclear if leukocytosis is 2/2 to multiple devices causing inflammation or if ture infection is occurring. Will monitor off antibiotics.     Transplant Status:  - Relisted Status 1 for combined heart-kidney (4/16/24)  - ABO  status: O+  - CMV+/EBV+/Toxo-/Hep B-/Hep C-  - Prospective cross match with every donor offer  - Due to potential risk of hyperacute allograft rejection, induction plan will be 500mg solumedrol x 2 (followed by steroid taper) and thymoglobulin       Recommendations:  - Continue heparin purge through impella & systemic heparin for goal assays 0.3-0.4 to minimize bleeding risks   - Discussed with CT surgeons removal of impella given lack of support 2/2 poor placement, on going hemolysis, infection risk. At this time, the surgical team has opted to maintain the impella.   - Consider increasing Seroquel and atarax given continued anxiety   - Continue goal even fluid status for next 24hrs   - Agree with repeat imaging to assess abdominal pain   - Please discuss with Endocrinology regarding perioperative plan for thyroid function   - Please collect HLA panel this morning   - During Advanced Therapy Meeting, the committee has agreed to upgrade Ms. Bowles to UNOS status 1 for Heart & Kidney. Ms. Bowles remains critically ill and at high risk of mortality with repeat transplantation for the following factors: redo-sternotomy, anxiety/delirium, tenuous respiratory function w/ recent pneumonia and continued ventilation requirements via ECMO, hepatic injury, ongoing hemolysis w/ transfusion requirements, thrombocytopenia & renal failure. Unfortunately, without dual transplantation she has a 100% risk of mortality given her multisystem organ failure.     Continue excellent care per CTICU team.      Patient was examined and discussed with Advanced Heart Failure and Transplant Cardiology attending physician, Dr. Sherman     Heart Transplant Team:   Epic Secure Chat  [phone] during day shift  [phone] during night shift     Keith Jain, APRN-CNP

## 2024-04-16 NOTE — PROGRESS NOTES
ECMO:  Cannulation Date: (most recent) 3/27  ECMO Indication: Cardiogenic Shock  Current Goals of Care: Full Code    ECMO Cannula Configuration: Right femoral venous-right femoral arterial   ECMO circuit run from drainage cannula to pump to oxygenator to return cannula: Yes  Pre/post PaO2 adequate: Yes  LV Unloading/Pulse Pressure: Right axillary Impella 5.5 at P2, 1L flow   Distal Perfusion: R DPC in place, adequate pulses    ECMO Settings:     Most Recent Range Past 24hrs   Flow 2.45 Patient Flow (L/min)  Min: 2.37  Max: 2.97   Speed 3975 Circuit Flow (RPM)  Min: 3975  Max: 3975   Sweep 4 Sweep Gas Flow Rate (L/min)  Min: 4  Max: 5     Invasive Hemodynamics:    Most Recent Range Past 24hrs   BP (Art) 83/66 Arterial Line BP 1  Min: 70/60  Max: 118/105   MAP(Art) 73 mmHg Arterial Line MAP 1 (mmHg)   Min: 62 mmHg  Max: 110 mmHg   RA/CVP   No data recorded   PA 41/34 No data recorded   PA(mean) 37 mmHg No data recorded   CO 5.5 L/min No data recorded   CI 2.8 L/min/m2 No data recorded   Mixed Venous 40 % SVO2 (%)  Min: 40 %  Max: 43.1 %   SVR  1115 (dyne*sec)/cm5 No data recorded     Impella Interrogation:      Most Recent Range Past 24hrs   Performance Level 2 P Level  Min: 2   Min taken time: 04/16/24 0700  Max: 3   Max taken time: 04/15/24 1500   Flow (L/min) 1.6 Flow (L/min)  Min: 1.3   Min taken time: 04/15/24 1100  Max: 1.8   Max taken time: 04/15/24 1500   Motor Current 115/73 Motor Current  Min: 105/66   Min taken time: 04/16/24 0000  Max: 184/123   Max taken time: 04/15/24 1500   Placement Signal Yes  Placement OK could not be evaluated. This SmartLink does not work with rows of the type: Custom List   Purge (mmHg) 501 Purge Pressure (mmHg)  Min: 370   Min taken time: 04/15/24 1700  Max: 600   Max taken time: 04/15/24 1100   Purge rate (mL/hr) 10 Purge Rate (mL/hr)  Min: 10   Min taken time: 04/16/24 0700  Max: 11   Max taken time: 04/15/24 1400     Ventilator Management:    Not intubated    Bleeding/Clot  Issues:   Anticoagulation/Monitoring: Heparin system following anti-Xa levels  Presence of cannula bleeding: None at present  Presence of oxygenator clot: None at present    Management Issues:  Road Trips planned for today? None for today  Mobility Planned today? Sit at side of bed   Weaning Plan/date:  Re-listing today  Family Updates/Concerns? Patient updated during rounds this morning and palliative care consult entered    Patient requires ECMO support. Drainage cannula site, cannula, pump, oxygenator, return cannula and return cannula site clinically inspected. RPM and sweep reviewed and adjusted appropriate to clinical context. Anticoagulation status and intensity discussed. Plan for weaning, next clinical steps discussed with team and family. Discussed with cardiothoracic surgeon, perfusion, palliative care and physical/occupational therapy.     I, as the attending critical care physician, have personally reviewed the recent patient history, physical exam, vital signs, significant labs, medications, imaging, and ECMO settings/flows of this critically ill patient. Using this information I will continue to actively manage this critically ill patient's extracorporeal membrane oxygenation (ECMO)/extracorporeal life support (ECLS) as documented in the assessment and plan above.

## 2024-04-16 NOTE — PROGRESS NOTES
"Charla Bowles is a 34 y.o. female on day 61 of admission presenting with Cardiogenic shock (Multi).    Subjective   Patient was seen at bedside.  She is sedated and on NC. Crrt at bedside.       Objective   Constitutional: Young -American female, alert and oriented x 0 secondary to sedation  Skin/Hair: Dry skin.  HEENT: EOMI, Anicteric scleras.    Neck: Right IJ in place  Cardiovascular: Tachycardic  Respiratory: no increased wob,  or accessory muscle use.  Neuro: Could not elicit reflexes  Psych : Sedated  Last Recorded Vitals  Blood pressure 78/63, pulse (!) 111, temperature 36.7 °C (98.1 °F), temperature source Temporal, resp. rate (!) 29, height 1.549 m (5' 0.98\"), weight 84 kg (185 lb 3 oz), SpO2 100%.  Intake/Output last 3 Shifts:  I/O last 3 completed shifts:  In: 2879.1 (34.3 mL/kg) [P.O.:440; I.V.:1129.1 (13.4 mL/kg); Blood:700; NG/GT:110; IV Piggyback:500]  Out: 3562 (42.4 mL/kg) [Other:3362; Stool:200]  Weight: 84 kg     Relevant Results  Results from last 7 days   Lab Units 04/16/24  0753 04/16/24  0034 04/15/24  1611 04/15/24  1356 04/15/24  0901 04/15/24  0537 04/15/24  0108 04/14/24  2043 04/14/24  1825 04/14/24  1417 04/14/24  0916 04/14/24  0514   POCT GLUCOSE mg/dL 149*  --  199*  --  137* 167*  --  130*   < >  --    < >  --    GLUCOSE mg/dL  --  101*  --  189*  --   --  158*  --   --  121*  --  106*    < > = values in this interval not displayed.     Results from last 7 days   Lab Units 04/16/24  0401   TSH mIU/L 21.57*   FREE T4 ng/dL 0.68*   T3 FREE pg/mL 0.8*        Assessment/Plan   Principal Problem:    Cardiogenic shock (Multi)  Active Problems:    Heart transplant recipient (Multi)    ESRD (end stage renal disease) on dialysis (Multi)    Immunosuppression (Multi)    Physical deconditioning    HIRAM (acute kidney injury) (CMS-HCC)    Acute passive congestion of liver    Hyponatremia    Iron deficiency anemia    Abdominal pain    Systolic congestive heart failure (Multi)    " History of left ventricular assist device (Multi)    History of extracorporeal membrane oxygenation treatment    Moderate protein-calorie malnutrition (Multi)    Heart transplant as cause of abnormal reaction or later complication (Multi)    Cardiac transplant rejection (Multi)    Acute combined systolic (congestive) and diastolic (congestive) heart failure (Multi)    Ms. Yimi Bowles is a 34F with a PMHx sig for stage D HFrEF (PPCM) s/p ICD s/p CardioMEMs, hypothyroidism 2/2 thyroidectomy, obesity s/p gastric bypass (2017), and SLE who is s/p heart transplant (3/31/2022) with a heart transplant course complicated by RIJ/RUE DVTs, leukopenia, left groin seroma, and asymptomatic 2R ACR (11/2022) treated with steroids, s/p total hysterectomy (2023), Flu A (1/2024).     Originally presented to [facility] ED on 2/15/24 with complaints of N/V x 3 days and inability to keep medications down. Found to have acute systolic heart failure with primary graft failure and cardiogenic shock. Treated for suspected acute cellular vs. acute antibody mediated rejection; however, multiple cardiac biopsies negative for signs of rejection or other causes of graft failure.     Course has been complicated by multiple MCS devices for refractory cardiogenic shock (right femoral IABP: 2/18/24 - 3/1/24, Left femoral VA ECMO: 2/19/24 - 2/29/24, Right axillary impella 5.5: 3/1/24 - remains in place, 2nd right femoral VA ECMO: 3/27/24 - remains in place), ARF requiring RRT, acute liver injury, intubation due to volume overload in setting of pulmonary edema, severe hemolysis 2/2 to impella malposition, mild CMV viremia, bilateral provoked IJ DVTs, multiple episodes of epistaxis & coagulopathies requiring ongoing blood product transfusions and ARF needing RRT.      Transferred to CTICU on 3/27/24 following right femoral VA ECMO placement due to worsening cardiogenic shock and multi-organ failure despite Impella 5.5 support and multiple inotropes.       Endocrinology was initially consulted to assist with levothyroxine dosing on 3/4 given TSH of 35. Patient home dose levothyroxine is 200 mcg daily. Recommendations were to increase her dose of levothyroxine to 250 between 3/5 to 3/11 and then she was tapered back to 200 mcg daily of levothyroxine.  Endocrinology later signed off.    Endocrine is now reengaged to assess her levothyroxine dosing given that the patient is being considered for kidney and heart transplant.Her weight-based dose would be 130 mcg daily.     TFTs  3/4/2024: TSH 35.9, free T4 0.9->Dose was increased to 250 mcg from 3/5 to 3/11 then tapered back to 200 mcg daily.  3/18/2024: TSH 20.74, free T4 1.1on 200 mcg of levothyroxine   4/1/2024: TSH 10.13, free T4 0.7  4/9/2024 TSH 10.69, free T40.74 on 200 mcg  4/16/24: TSH 21.5, free T4 0.68 on  mcg levothyroxine (4/10-4/16)    Patient is likely not absorbing the medication in the setting of gut edema.  The levothyroxine is being  from all other pills by at least 3 hours so this is not the issue.      Recommendation  - Stop PO levothyroxine and change to IV levothyroxine 90 mcg daily  - Repeat tsh and free t4 for next tues 4/23    Pramod Rivera MD  Endocrinology, PGY 5  Pager [phone] or secure chat

## 2024-04-16 NOTE — PROGRESS NOTES
Charla Bowles is a 34 y.o. female on day 61 of admission presenting with Cardiogenic shock (Multi).    Subjective   Patient discussed in morning handover, patient examined and chart reviewed. Meds, labs and radiology reviewed during full interdisciplinary rounds this morning. Patient reviewed with Surgical HF and co-rounded with and HF Cardiology today.     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      Patient presented to [facility] ED on 2/15/24 with complaints of N/V x 3 days and inability to keep medications down. POCUS revealed acute systolic heart failure w/ severe BIV dysfunction when compared to previous images in 11/2023. Also noted to have HIRAM requiring CVVH and transaminitis. Immunosuppression notably below therapeutic range. Admitted to HFICU and treated for suspected acute cellular vs. acute antibody mediated rejection (Endomyocardial biopsy on 2/16 revealed mild ACR with +CD4s and negative HLAs). Despite rejection therapy, inotropes and MCS via IABP (2/18/24), the patient's end organ function continued to worsen without improvement in cardiac function. Ultimately placed on left femoral VA ECMO w/ Dr. Marina on 2/19/2024 and transferred to CTICU.      Course in Hospital:     Prolonged course with multiple rounds of PLEX and IvIG and MCS devices including IABP, ECMO and Impella.  Patient has been re-listed for transplant, however recent concerns for sepsis have resulted in de-listing the patient for the short-term.  Patient has been on antibiotics for 6-7 days and is improving clinically,      PMH:  stage D HFrEF (PPCM) s/p ICD s/p CardioMEMs,    and SLE who is s/p OHT (3/31/2022) with a post-OHT course complicated by  RIJ/RUE DVTs, leukopenia, left groin seroma, and asymptomatic 2R ACR (11/2022) treated with steroids, s/p total hysterectomy (2023), Flu A (1/2024).   Remote DVT   Hypothyroidism 2/2 thyroidectomy  Obesity s/p gastric bypass (2017)  MMF colitis     Overnight: No acute issues overnight. Stable MCS device function    Objective     Physical Exam  Constitutional:       Appearance: Normal appearance. She is not ill-appearing, toxic-appearing or diaphoretic.   HENT:      Head:      Comments: No epistaxis for several days.  Eyes:      Pupils: Pupils are equal, round, and reactive to light.   Cardiovascular:      Comments: See separate ECMO note    CVP:  [0 mmHg-6 mmHg] 0 mmHg    Lines:  R internal jugular dialysis (4/6)  R rad. Art. Line.   Pulmonary:      Effort: Tachypnea present.      Breath sounds: Decreased breath sounds present.      Comments: Tachypnea is intermittent  Abdominal:      General: Bowel sounds are normal.      Palpations: Abdomen is soft.      Tenderness: There is no abdominal tenderness. There is no guarding or rebound.      Comments: Patient subjectively endorsing crampy abdominal pain. No diarrhea.    Poor p.o. intake   Genitourinary:     Comments: On CVVHDF  -~100cc 24 hours  Neurological:      Mental Status: She is alert and oriented to person, place, and time.      Comments: CAM-ICU -  RASS 0    Requiring low dose quetiapine and dexmedetomidine for sleep at night.       Impella Interrogation:   Impella:      Most Recent Range Past 24hrs   Performance Level  P Level  Min: 2   Min taken time: 04/16/24 0700  Max: 3   Max taken time: 04/15/24 1500   Flow (L/min) 1.6 Flow (L/min)  Min: 1.3   Min taken time: 04/15/24 1100  Max: 1.8   Max taken time: 04/15/24 1500   Motor Current 115/73 Motor Current  Min: 105/66   Min taken time: 04/16/24 0000  Max: 184/123   Max taken time: 04/15/24 1500   Placement Signal Yes  Placement OK could not be evaluated. This SmartLink does not work with rows of the type:  "Custom List   Purge (mmHg) 501 Purge Pressure (mmHg)  Min: 370   Min taken time: 04/15/24 1700  Max: 600   Max taken time: 04/15/24 1100   Purge rate (mL/hr) 10 Purge Rate (mL/hr)  Min: 10   Min taken time: 04/16/24 0700  Max: 11   Max taken time: 04/15/24 1400   LDH > 3400 today; no position alarms overnight. Discussed on rounds this morning - plan to leave Impella in situ until time of OR    Last Recorded Vitals  Blood pressure 78/63, pulse 90, temperature 36.7 °C (98.1 °F), temperature source Temporal, resp. rate (!) 32, height 1.549 m (5' 0.98\"), weight 84 kg (185 lb 3 oz), SpO2 93%.  Intake/Output last 3 Shifts:  I/O last 3 completed shifts:  In: 2879.1 (34.3 mL/kg) [P.O.:440; I.V.:1129.1 (13.4 mL/kg); Blood:700; NG/GT:110; IV Piggyback:500]  Out: 3562 (42.4 mL/kg) [Other:3362; Stool:200]  Weight: 84 kg     Assessment/Plan     ICU Liberation Bundle:  A-Analgesia: Tylenol  B-SBT: not intubated  C-RASS Target: 0  D-CAM: negative  E-Mobilization Plan: ICU rehab with PT  F - Family Last Update: updated by the team    Daily Checklist:  HOB > 30 degrees: extubated  Feeding: p.o. clear liquid diet per SLP; patient requested removal of enteral feeding tube yesterday; will ask Dietician to see  Thromboprophylaxis: Heparin systemic and purge for Impella and SCDs  Ulcer Prophylaxis: PPI  Glucose Control: Target ): < 180 achieved; no hypoglycemia  Line(s) to removed: None at present  Bowel Care: Bowel regime held due to loose stools  De-escalation of Antibiotics: discontinued yesterday    Plan:  Target even fluid balance/24 hours  Check KUB this morning  Check thyroid levels today; will follow-up with Endo about conversion to IV administration  Rest protocol overnight  Follow-up with new cultures (negative to date). C.diff negative. Antibiotics stopped yesterday and patient has been clinically unchanged in past 12 hours. ID is following    Transplant team discussed resolved to re-list the patient Status 1 today.    I " spent 48 minutes in the professional and overall care of this patient.    This critically ill patient continues to be at-risk for clinically significant deterioration / failure due to the above mentioned dysfunctional, unstable organ systems. I have personally identified and managed all complex critical care issues to prevent aforementioned clinical deterioration.  Critical care time is spent at bedside and/or the immediate area and has included, but is not limited to, the review of diagnostic tests, labs, radiographs, serial assessments of hemodynamics, respiratory status, ventilatory management, and family updates.  Time spent in procedures and teaching are reported separately.    Eduardo Banks MD

## 2024-04-17 NOTE — PROGRESS NOTES
[facility]  ADVANCED HEART FAILURE AND TRANSPLANT CARDIOLOGY   Charla Bowles/[id number]    Admit Date: 2/15/2024  Hospital Length of Stay: 62   ICU Length of Stay: 20d 16h   Primary Service: CTICU    Charla Bowles is a 34 y.o. female on day 62 of admission presenting with Cardiogenic shock (Multi).    Subjective   Jardaia continues to endorse anxiety and requests to be moved in bed due to discomfort. Conversations are very difficult due to frequent distractions. Denies abdominal pain today.     Objective     Physical Exam  Constitutional:       Appearance: She is ill-appearing.   HENT:      Head: Normocephalic.      Comments: Not actively bleeding      Mouth/Throat:      Mouth: Mucous membranes are moist.      Pharynx: Oropharynx is clear. No oropharyngeal exudate or posterior oropharyngeal erythema.   Eyes:      General: Scleral icterus present.      Extraocular Movements: Extraocular movements intact.      Pupils: Pupils are equal, round, and reactive to light.   Neck:      Vascular: No JVD.   Cardiovascular:      Rate and Rhythm: Tachycardia present.      Comments: Right axillary impella in place ~45cm at hub (no hematoma), Right femoral VA ECMO in place with reperfusion catheter (site appears clean and dry). Dopplerable radial and ulnar pulses bilaterally. Dopplerable PT pulses bilateral. Unable to doppler DP bilaterally.  Pulmonary:      Effort: No accessory muscle usage or retractions.      Breath sounds: Rhonchi and rales present. No wheezing.      Comments: RA. Tachypnea w/ RR 20-30. Small amount of blood tinged and purulent sputum.  Abdominal:      General: Abdomen is flat. Bowel sounds are normal. There is no distension.      Palpations: Abdomen is soft. There is no mass.      Hernia: No hernia is present.   Musculoskeletal:         General: No swelling, tenderness or signs of injury. Normal range of motion.      Cervical back: Full passive range of motion without  "pain.   Skin:     General: Skin is dry.      Capillary Refill: Capillary refill takes less than 2 seconds.      Coloration: Skin is jaundiced.      Findings: Bruising and lesion (Left groin wound with slough.) present. No erythema, laceration, rash or wound.   Neurological:      General: No focal deficit present.      Mental Status: She is alert. She is disoriented.      Comments: A/Ox2 (confused to date/time).    Psychiatric:         Mood and Affect: Mood is not anxious.      Comments: Anxious       Last Recorded Vitals  Blood pressure (!) 103/95, pulse 105, temperature 35.6 °C (96.1 °F), temperature source Skin, resp. rate 23, height 1.549 m (5' 0.98\"), weight 84 kg (185 lb 3 oz), SpO2 97%.  Intake/Output last 3 Shifts:  I/O last 3 completed shifts:  In: 2596.7 (30.9 mL/kg) [P.O.:400; I.V.:846.7 (10.1 mL/kg); Blood:1250; IV Piggyback:100]  Out: 1080 (12.9 mL/kg) [Other:1080]  Weight: 84 kg     Relevant Results  Scheduled medications  calcium carbonate-vitamin D3, 1 tablet, oral, Daily  cyanocobalamin, 500 mcg, oral, Daily  darbepoetin floyd, 100 mcg, subcutaneous, q14 days  ferrous sulfate (325 mg ferrous sulfate), 65 mg of iron, oral, Daily with breakfast  folic acid, 1 mg, oral, Daily  insulin lispro, 0-15 Units, subcutaneous, TID with meals  levothyroxine, 90 mcg, intravenous, q24h TRICIA  lidocaine, 1 patch, transdermal, Daily  melatonin, 10 mg, oral, Daily  multivitamin with minerals, 1 tablet, oral, Daily  mupirocin, 1 Application, Each Nostril, BID  nystatin, 5 mL, Swish & Swallow, q6h  oxygen, , inhalation, Continuous - Inhalation  oxygen, , inhalation, Continuous - Inhalation  pantoprazole, 40 mg, intravenous, Daily  polyethylene glycol, 17 g, oral, Daily  predniSONE, 10 mg, oral, Daily  QUEtiapine, 50 mg, oral, BID  [Held by provider] rosuvastatin, 10 mg, oral, Nightly  sennosides-docusate sodium, 2 tablet, oral, BID  sulfamethoxazole-trimethoprim, 80 mg of trimethoprim, oral, Daily  valGANciclovir, 450 mg, " oral, q48h      Continuous medications  dexmedeTOMIDine, 0.1-1.5 mcg/kg/hr, Last Rate: Stopped (04/16/24 1300)  heparin, 0-4,000 Units/hr, Last Rate: 600 Units/hr (04/17/24 1100)  [Held by provider] heparin Impella Purge 25 units/mL in 500 mL D5W, 10 mL/hr, Last Rate: 10 mL/hr (04/17/24 1100)  lactated Ringer's, 5 mL/hr, Last Rate: 5 mL/hr (04/17/24 1100)  PrismaSol 4/2.5, 2,400 mL/hr, Last Rate: 2,400 mL/hr (04/13/24 1700)  sodium bicarbonate infusion Impella Purge 25 mEq/1000 mL D5W, 10 mL/hr      PRN medications  PRN medications: acetaminophen **OR** [DISCONTINUED] acetaminophen, alteplase, bisacodyl, calcium gluconate, calcium gluconate, dextrose **OR** glucagon, hydrALAZINE, hydrOXYzine HCL, ipratropium-albuteroL, artificial tears, magnesium sulfate, magnesium sulfate, microfibrllar collagen, mupirocin, naloxone, ondansetron, oxyCODONE, sodium chloride     Results for orders placed or performed during the hospital encounter of 02/15/24 (from the past 24 hour(s))   Blood Gas Arterial Full Panel   Result Value Ref Range    POCT pH, Arterial 7.43 (H) 7.38 - 7.42 pH    POCT pCO2, Arterial 29 (L) 38 - 42 mm Hg    POCT pO2, Arterial 132 (H) 85 - 95 mm Hg    POCT SO2, Arterial 100 94 - 100 %    POCT Oxy Hemoglobin, Arterial 92.1 (L) 94.0 - 98.0 %    POCT Hematocrit Calculated, Arterial 20.0 (L) 36.0 - 46.0 %    POCT Sodium, Arterial 132 (L) 136 - 145 mmol/L    POCT Potassium, Arterial 4.7 3.5 - 5.3 mmol/L    POCT Chloride, Arterial 101 98 - 107 mmol/L    POCT Ionized Calcium, Arterial 1.11 1.10 - 1.33 mmol/L    POCT Glucose, Arterial 173 (H) 74 - 99 mg/dL    POCT Lactate, Arterial 1.8 0.4 - 2.0 mmol/L    POCT Base Excess, Arterial -4.6 (L) -2.0 - 3.0 mmol/L    POCT HCO3 Calculated, Arterial 19.2 (L) 22.0 - 26.0 mmol/L    POCT Hemoglobin, Arterial 6.5 (LL) 12.0 - 16.0 g/dL    POCT Anion Gap, Arterial 17 10 - 25 mmo/L    Patient Temperature 37.0 degrees Celsius    FiO2 21 %   Calcium, ionized   Result Value Ref Range     POCT Calcium, Ionized 1.10 1.1 - 1.33 mmol/L   Heparin Assay, UFH   Result Value Ref Range    Heparin Unfractionated 0.4 See Comment Below for Therapeutic Ranges IU/mL   Blood Gas Arterial Full Panel   Result Value Ref Range    POCT pH, Arterial 7.38 7.38 - 7.42 pH    POCT pCO2, Arterial 27 (L) 38 - 42 mm Hg    POCT pO2, Arterial 56 (L) 85 - 95 mm Hg    POCT SO2, Arterial 95 94 - 100 %    POCT Oxy Hemoglobin, Arterial 87.9 (L) 94.0 - 98.0 %    POCT Hematocrit Calculated, Arterial 20.0 (L) 36.0 - 46.0 %    POCT Sodium, Arterial 131 (L) 136 - 145 mmol/L    POCT Potassium, Arterial 5.6 (H) 3.5 - 5.3 mmol/L    POCT Chloride, Arterial 101 98 - 107 mmol/L    POCT Ionized Calcium, Arterial 1.11 1.10 - 1.33 mmol/L    POCT Glucose, Arterial 217 (H) 74 - 99 mg/dL    POCT Lactate, Arterial 3.8 (H) 0.4 - 2.0 mmol/L    POCT Base Excess, Arterial -8.3 (L) -2.0 - 3.0 mmol/L    POCT HCO3 Calculated, Arterial 16.0 (L) 22.0 - 26.0 mmol/L    POCT Hemoglobin, Arterial 6.8 (L) 12.0 - 16.0 g/dL    POCT Anion Gap, Arterial 20 10 - 25 mmo/L    Patient Temperature 37.0 degrees Celsius    FiO2 21 %   CBC   Result Value Ref Range    WBC 23.4 (H) 4.4 - 11.3 x10*3/uL    nRBC 14.8 (H) 0.0 - 0.0 /100 WBCs    RBC 2.10 (L) 4.00 - 5.20 x10*6/uL    Hemoglobin 6.6 (L) 12.0 - 16.0 g/dL    Hematocrit 18.2 (L) 36.0 - 46.0 %    MCV 87 80 - 100 fL    MCH 31.4 26.0 - 34.0 pg    MCHC 36.3 (H) 32.0 - 36.0 g/dL    RDW 19.2 (H) 11.5 - 14.5 %    Platelets 78 (L) 150 - 450 x10*3/uL   POCT GLUCOSE   Result Value Ref Range    POCT Glucose 169 (H) 74 - 99 mg/dL   Prepare RBC: 1 Units, Leukocytes Reduced (CMV reduced risk)   Result Value Ref Range    PRODUCT CODE W6107K27     Unit Number [id number]     Unit ABO O     Unit RH POS     XM INTEP COMP     Dispense Status XM     Blood Expiration Date May 11, 2024 23:59 EDT     PRODUCT BLOOD TYPE 5100     UNIT VOLUME 281    Blood Gas Arterial Full Panel   Result Value Ref Range    POCT pH, Arterial 7.28 (L) 7.38 - 7.42  pH    POCT pCO2, Arterial 36 (L) 38 - 42 mm Hg    POCT pO2, Arterial 106 (H) 85 - 95 mm Hg    POCT SO2, Arterial 99 94 - 100 %    POCT Oxy Hemoglobin, Arterial 91.3 (L) 94.0 - 98.0 %    POCT Hematocrit Calculated, Arterial 20.0 (L) 36.0 - 46.0 %    POCT Sodium, Arterial 133 (L) 136 - 145 mmol/L    POCT Potassium, Arterial 4.8 3.5 - 5.3 mmol/L    POCT Chloride, Arterial 99 98 - 107 mmol/L    POCT Ionized Calcium, Arterial 1.10 1.10 - 1.33 mmol/L    POCT Glucose, Arterial 204 (H) 74 - 99 mg/dL    POCT Lactate, Arterial 2.8 (H) 0.4 - 2.0 mmol/L    POCT Base Excess, Arterial -9.0 (L) -2.0 - 3.0 mmol/L    POCT HCO3 Calculated, Arterial 16.9 (L) 22.0 - 26.0 mmol/L    POCT Hemoglobin, Arterial 6.7 (L) 12.0 - 16.0 g/dL    POCT Anion Gap, Arterial 22 10 - 25 mmo/L    Patient Temperature 37.0 degrees Celsius    FiO2 21 %   Blood Gas Arterial Full Panel   Result Value Ref Range    POCT pH, Arterial 7.31 (L) 7.38 - 7.42 pH    POCT pCO2, Arterial 44 (H) 38 - 42 mm Hg    POCT pO2, Arterial 112 (H) 85 - 95 mm Hg    POCT SO2, Arterial 99 94 - 100 %    POCT Oxy Hemoglobin, Arterial 92.0 (L) 94.0 - 98.0 %    POCT Hematocrit Calculated, Arterial 23.0 (L) 36.0 - 46.0 %    POCT Sodium, Arterial 134 (L) 136 - 145 mmol/L    POCT Potassium, Arterial 4.5 3.5 - 5.3 mmol/L    POCT Chloride, Arterial 101 98 - 107 mmol/L    POCT Ionized Calcium, Arterial 1.07 (L) 1.10 - 1.33 mmol/L    POCT Glucose, Arterial 127 (H) 74 - 99 mg/dL    POCT Lactate, Arterial 1.6 0.4 - 2.0 mmol/L    POCT Base Excess, Arterial -3.8 (L) -2.0 - 3.0 mmol/L    POCT HCO3 Calculated, Arterial 22.2 22.0 - 26.0 mmol/L    POCT Hemoglobin, Arterial 7.5 (L) 12.0 - 16.0 g/dL    POCT Anion Gap, Arterial 15 10 - 25 mmo/L    Patient Temperature 37.0 degrees Celsius    FiO2 21 %   Type And Screen   Result Value Ref Range    ABO TYPE O     Rh TYPE POS     ANTIBODY SCREEN NEG    Calcium, ionized   Result Value Ref Range    POCT Calcium, Ionized 1.04 (L) 1.1 - 1.33 mmol/L   CBC    Result Value Ref Range    WBC 21.9 (H) 4.4 - 11.3 x10*3/uL    nRBC 14.8 (H) 0.0 - 0.0 /100 WBCs    RBC 2.37 (L) 4.00 - 5.20 x10*6/uL    Hemoglobin 7.0 (L) 12.0 - 16.0 g/dL    Hematocrit 19.7 (L) 36.0 - 46.0 %    MCV 83 80 - 100 fL    MCH 29.5 26.0 - 34.0 pg    MCHC 35.5 32.0 - 36.0 g/dL    RDW 18.0 (H) 11.5 - 14.5 %    Platelets 94 (L) 150 - 450 x10*3/uL   Coagulation Screen   Result Value Ref Range    Protime 15.1 (H) 9.8 - 12.8 seconds    INR 1.3 (H) 0.9 - 1.1    aPTT 53 (H) 27 - 38 seconds   Fibrinogen   Result Value Ref Range    Fibrinogen 260 200 - 400 mg/dL   Cytomegalovirus IgG   Result Value Ref Range    Cytomegalovirus IgG Reactive (A) Nonreactive   HIV 1/2 Antigen/Antibody Screen with Reflex to Confirmation   Result Value Ref Range    HIV 1/2 Antigen/Antibody Screen with Reflex to Confirmation Nonreactive Nonreactive   SARS-COV-2 PCR   Result Value Ref Range    Coronavirus 2019, PCR Not Detected Not Detected   Cytomegalovirus IgG   Result Value Ref Range    Cytomegalovirus IgG Reactive (A) Nonreactive   Cresencio-Barr Virus Antibody Panel   Result Value Ref Range    EBV VCA, IgG  Positive (A) Negative    EBV VCA, IgM  Negative Negative    EBV Early Antigen Antibody, IgG Negative Negative    EBV Nuclear Antigen Antibody, IgG Positive (A) Negative   Hepatitis B Core Antibody, Total   Result Value Ref Range    Hepatitis B Core AB- Total Nonreactive Nonreactive   Hepatitis B Surface Antigen   Result Value Ref Range    Hepatitis B Surface AG Nonreactive Nonreactive   Hepatitis B Surface Antibody   Result Value Ref Range    Hepatitis B Surface .3 (H) <10.0 mIU/mL   Hepatitis C Antibody   Result Value Ref Range    Hepatitis C AB Nonreactive Nonreactive   Toxoplasma IgG   Result Value Ref Range    Toxoplasma IgG Nonreactive Nonreactive   Varicella Zoster Antibody, IgG   Result Value Ref Range    Varicella Zoster, IgG Positive (A) Negative    Varicella Zoster, IgG Index 2.7 (H) <=0.8 IA   Reticulocytes    Result Value Ref Range    Retic % 2.2 (H) 0.5 - 2.0 %    Retic Absolute 0.047 0.018 - 0.083 x10*6/uL    Reticulocyte Hemoglobin 31 28 - 38 pg    Immature Retic fraction 38.2 (H) <=16.0 %   Haptoglobin   Result Value Ref Range    Haptoglobin <10 (L) 37 - 246 mg/dL   CBC and Auto Differential   Result Value Ref Range    WBC 21.7 (H) 4.4 - 11.3 x10*3/uL    nRBC 16.4 (H) 0.0 - 0.0 /100 WBCs    RBC 2.16 (L) 4.00 - 5.20 x10*6/uL    Hemoglobin 6.6 (L) 12.0 - 16.0 g/dL    Hematocrit 17.7 (L) 36.0 - 46.0 %    MCV 82 80 - 100 fL    MCH 30.6 26.0 - 34.0 pg    MCHC 37.3 (H) 32.0 - 36.0 g/dL    RDW 18.2 (H) 11.5 - 14.5 %    Platelets 51 (L) 150 - 450 x10*3/uL    Immature Granulocytes %, Automated 10.4 (H) 0.0 - 0.9 %    Immature Granulocytes Absolute, Automated 2.26 (H) 0.00 - 0.70 x10*3/uL   Lactate Dehydrogenase   Result Value Ref Range    LDH 3,827 (H) 84 - 246 U/L   Magnesium   Result Value Ref Range    Magnesium 2.82 (H) 1.60 - 2.40 mg/dL   Hepatic Function Panel   Result Value Ref Range    Albumin 4.4 3.4 - 5.0 g/dL    Bilirubin, Total 3.2 (H) 0.0 - 1.2 mg/dL    Bilirubin, Direct 0.5 (H) 0.0 - 0.3 mg/dL    Alkaline Phosphatase 185 (H) 33 - 110 U/L    ALT 39 7 - 45 U/L     (H) 9 - 39 U/L    Total Protein 6.3 (L) 6.4 - 8.2 g/dL   Phosphorus   Result Value Ref Range    Phosphorus 2.3 (L) 2.5 - 4.9 mg/dL   Basic Metabolic Panel   Result Value Ref Range    Glucose 147 (H) 74 - 99 mg/dL    Sodium 134 (L) 136 - 145 mmol/L    Potassium 4.3 3.5 - 5.3 mmol/L    Chloride 98 98 - 107 mmol/L    Bicarbonate 19 (L) 21 - 32 mmol/L    Anion Gap 21 (H) 10 - 20 mmol/L    Urea Nitrogen 23 6 - 23 mg/dL    Creatinine 0.68 0.50 - 1.05 mg/dL    eGFR >90 >60 mL/min/1.73m*2    Calcium 8.4 (L) 8.6 - 10.3 mg/dL   Manual Differential   Result Value Ref Range    Neutrophils %, Manual 65.6 40.0 - 80.0 %    Bands %, Manual 7.8 0.0 - 5.0 %    Lymphocytes %, Manual 3.1 13.0 - 44.0 %    Monocytes %, Manual 11.0 2.0 - 10.0 %    Eosinophils %, Manual  0.0 0.0 - 6.0 %    Basophils %, Manual 0.0 0.0 - 2.0 %    Metamyelocytes %, Manual 9.4 0.0 - 0.0 %    Myelocytes %, Manual 3.1 0.0 - 0.0 %    Seg Neutrophils Absolute, Manual 14.24 (H) 1.20 - 7.00 x10*3/uL    Bands Absolute, Manual 1.69 (H) 0.00 - 0.70 x10*3/uL    Lymphocytes Absolute, Manual 0.67 (L) 1.20 - 4.80 x10*3/uL    Monocytes Absolute, Manual 2.39 (H) 0.10 - 1.00 x10*3/uL    Eosinophils Absolute, Manual 0.00 0.00 - 0.70 x10*3/uL    Basophils Absolute, Manual 0.00 0.00 - 0.10 x10*3/uL    Metamyelocytes Absolute, Manual 2.04 0.00 - 0.00 x10*3/uL    Myelocytes Absolute, Manual 0.67 0.00 - 0.00 x10*3/uL    Total Cells Counted 64     Neutrophils Absolute, Manual 15.93 (H) 1.20 - 7.70 x10*3/uL    RBC Morphology See Below     RBC Fragments Few     [place] Cells Few    Heparin Assay, UFH   Result Value Ref Range    Heparin Unfractionated 0.3 See Comment Below for Therapeutic Ranges IU/mL   Blood Gas Arterial Full Panel   Result Value Ref Range    POCT pH, Arterial 7.33 (L) 7.38 - 7.42 pH    POCT pCO2, Arterial 40 38 - 42 mm Hg    POCT pO2, Arterial 155 (H) 85 - 95 mm Hg    POCT SO2, Arterial 100 94 - 100 %    POCT Oxy Hemoglobin, Arterial 91.5 (L) 94.0 - 98.0 %    POCT Hematocrit Calculated, Arterial 21.0 (L) 36.0 - 46.0 %    POCT Sodium, Arterial 135 (L) 136 - 145 mmol/L    POCT Potassium, Arterial 4.8 3.5 - 5.3 mmol/L    POCT Chloride, Arterial 101 98 - 107 mmol/L    POCT Ionized Calcium, Arterial 1.14 1.10 - 1.33 mmol/L    POCT Glucose, Arterial 153 (H) 74 - 99 mg/dL    POCT Lactate, Arterial 1.2 0.4 - 2.0 mmol/L    POCT Base Excess, Arterial -4.5 (L) -2.0 - 3.0 mmol/L    POCT HCO3 Calculated, Arterial 21.1 (L) 22.0 - 26.0 mmol/L    POCT Hemoglobin, Arterial 7.1 (L) 12.0 - 16.0 g/dL    POCT Anion Gap, Arterial 18 10 - 25 mmo/L    Patient Temperature 37.0 degrees Celsius    FiO2 21 %   ECMO VENOUS FULL PANEL   Result Value Ref Range    POCT pH, Venous ECMO 7.30 Reference range not established pH    POCT pCO2, Venous  ECMO 46 Reference range not established mm Hg    POCT pO2,  Venous  ECMO 30 Reference range not established mm Hg    POCT SO2, Venous ECMO 62 Reference range not established %    POCT Oxy Hemoglobin, Venous ECMO 56.9 Reference range not established %    POCT Hematocrit Calculated, Venous ECMO 19.0 (L) 36.0 - 46.0 %    POCT Sodium, Venous ECMO 135 (L) 136 - 145 mmol/L    POCT Potassium, Venous ECMO 4.6 3.5 - 5.3 mmol/L    POCT Chloride, Venous ECMO 100 98 - 107 mmol/L    POCT Ionized Calcium, Venous ECMO 1.14 1.10 - 1.33 mmol/L    POCT Glucose, Venous ECMO 148 (H) 74 - 99 mg/dL    POCT Lactate Venous ECMO 1.1 0.4 - 2.0 mmol/L    POCT Base Excess, Venous ECMO -3.5 Reference range not established mmol/L    POCT HCO3 Calculated, Venous ECMO 22.6 Reference range not established mmol/L    POCT Hemoglobin, Venous ECMO 6.4 (LL) 12.0 - 16.0 g/dL    POCT Anion Gap, Venous ECMO 17 Reference range not established mmo/L    Patient Temperature 37.0 degrees Celsius    FiO2 80 %   Prepare RBC: 2 Units, Leukocytes Reduced (CMV reduced risk)   Result Value Ref Range    PRODUCT CODE [id number]     Unit Number [id number]     Unit ABO O     Unit RH POS     XM INTEP COMP     Dispense Status IS     Blood Expiration Date May 10, 2024 23:59 EDT     PRODUCT BLOOD TYPE 5100     UNIT VOLUME 350     PRODUCT CODE [id number]     Unit Number [id number]     Unit ABO O     Unit RH POS     XM INTEP COMP     Dispense Status TR     Blood Expiration Date May 11, 2024 23:59 EDT     PRODUCT BLOOD TYPE 5100     UNIT VOLUME 350    POCT GLUCOSE   Result Value Ref Range    POCT Glucose 182 (H) 74 - 99 mg/dL   CBC   Result Value Ref Range    WBC 21.5 (H) 4.4 - 11.3 x10*3/uL    nRBC 17.9 (H) 0.0 - 0.0 /100 WBCs    RBC 2.62 (L) 4.00 - 5.20 x10*6/uL    Hemoglobin 8.0 (L) 12.0 - 16.0 g/dL    Hematocrit 22.3 (L) 36.0 - 46.0 %    MCV 85 80 - 100 fL    MCH 30.5 26.0 - 34.0 pg    MCHC 35.9 32.0 - 36.0 g/dL    RDW 18.2 (H) 11.5 - 14.5 %    Platelets 94 (L) 150 -  450 x10*3/uL   POCT GLUCOSE   Result Value Ref Range    POCT Glucose 112 (H) 74 - 99 mg/dL     *Note: Due to a large number of results and/or encounters for the requested time period, some results have not been displayed. A complete set of results can be found in Results Review.        Malnutrition Diagnosis Status: Ongoing  Malnutrition Diagnosis: Moderate malnutrition related to acute disease or injury  As Evidenced by: pt's reported intake likely meeting <75% of estimated needs for at least 7 days, previous 5% weight loss in 1 month, LOS 42.  I agree with the dietitian's malnutrition diagnosis.      Assessment/Plan   Ms. Yimi Bowles is a 34F with a PMHx sig for stage D HFrEF (PPCM) s/p ICD s/p CardioMEMs, hypothyroidism 2/2 thyroidectomy, obesity s/p gastric bypass (2017), and SLE who is s/p OHT (3/31/2022) with a post-OHT course complicated by RIJ/RUE DVTs, leukopenia, left groin seroma, and asymptomatic 2R ACR (11/2022) treated with steroids, s/p total hysterectomy (2023), Flu A (1/2024).     Originally presented to [facility] ED on 2/15/24 with complaints of N/V x 3 days and inability to keep medications down. Found to have acute systolic heart failure with primary graft failure and cardiogenic shock. Treated for suspected acute cellular vs. acute antibody mediated rejection; however, multiple cardiac biopsies negative for pathology indicating rejection or other causes of graft failure. Course has been complicated by multiple MCS devices for refractory cardiogenic shock (right femoral IABP: 2/18/24 - 3/1/24, Left femoral VA ECMO: 2/19/24 - 2/29/24, Right axillary impella 5.5: 3/1/24 - remains in place, 2nd right femoral VA ECMO: 3/27/24 - remains in place), ARF requiring RRT, acute liver injury, intubation due to hypoxic respiratory failure, severe hemolysis 2/2 to impella malposition, mild CMV viremia, bilateral provoked IJ DVTs, multiple episodes of epistaxis, coagulopathies requiring ongoing blood product  transfusions, & HCAP.       Transferred to CTICU on 3/27/24 following right femoral VA ECMO placement due to worsening cardiogenic shock and multi-organ failure despite Impella 5.5 support and multiple inotropes. Attempting for retransplantation of heart and new kidney transplantation.     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  #Acute renal failure requiring RRT   #Acute transaminitis  #Coagulopathy  #Thrombocytopenia   #Anemia   #Provoked bilateral IJ DVTs    #Left SFA occlusion   #Malnutrition  #Delirium/Anxiety  #HCAP       Donor/Recipient Infectious history:  CMV: -/+ (low grade CMV viremia w/ levels < 35 on 3/1/24, repeat CMV levels remain negative since 4/7/24)  Toxo: -/-   Hep C: -/-     Rejection/Prophylaxis (transplants):  - Steroids: Continue 10mg PO prednisone daily (risk for adrenal insufficiency if stopped)  - Tacrolimus: stopped on 4/9/24 due to infection   - Myfortic acid: stopped on 4/9/24 due to infection  - Antifungals: nystatin 500,000units Q6  - Antivirals: valcyte 450mg Q48hrs   - Anti PCP & Toxoplasmosis: bactrim 200-40mg daily      Last cardiac biopsy: 2/16/24 with ACR grade 1R and no AMR (extremely low C4d+ detected), 2/20/24 negative for ACR and AMR  Last HLA: 4/16/24 negative for class 1 or 2 antibodies   Last RHC: closing numbers on 3/16/24 /86(97) RA 20, PAP 40/33(37), C.O./C.I. 5.5/2.8, PVR 88, SVR 1115   Last LHC: 2/18/24 negative for CAV and CAD   Last TTE/BOB: 4/16/24 shows severe LVEF w/ global hypokinesis, mild RV dysfunction, mild-mod AR, mod MR, mild TR and impella angled posteriorly   Osteopenia/osteoporosis prophylaxis: Vitamin D3 currently held. Continue calcium supplements  Peptic/gastric ulcer prophylaxis: Pantoprazole 40mg daily  CAV Prophylaxis: Holding Aspirin 81mg due to continued thrombocytopenia. Holding rosuvastatin due to transaminases.      - Unclear cause of acute severe  graft dysfunction. Broad differential including SLE flair, giant cell vasculitis, CAV/CAD, viral cardiomyopathy, sarcoidosis, amyloidosis and allograft rejection amongst many others. 2xallograft biopsies on 2/16 & 2/20 remain negative for any significant pathology. Notably, both biopsies taken after rejection therapies implemented which may have reduced areas of graft damage.    - DSAs remain negative; however, patient may have non-HLA antibodies present. 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   - Negative CAV & CAD via LHC on 2/18/24   - Completed methylpred steroid pulse w/ 1g Q24 x 3 days (2/16-2/18) and prednisone taper   - Thymoglobulin doses: 2/18 & 2/19   - IVIG + PLEX Session: 2/18, 2/20, 3/7, 3/11 and 3/13    - Right femoral VA ECMO flowing 3.8L w/ FIO2 80% and sweep 4  - Right axillary impella for LV venting remains in place and set P2 w/ flows of 1.0  - Impella remains poorly positioned and appears to be in contact with mitral valve leaflets. LDH continues to rise indicating worsening hemolysis. On going transfusion requirements.   - LFTs remain elevated with uptrending bilirubin, jaundice and scleral icterus   - Originally failed swallow eval on 4/10/24. ENT placed dobhoff w/ tubefeeds for nutritional support; however, patient requested removal on 4/15. Passed formal swallow eval on 4/15. Unfortunately, she has had poor PO intake and continues to have poor nutritional support.     - Intermittently delirious and anxious  - Sitting at edge of bed and standing daily w/ PT/OT & CTICU team assistance   - CT scan on 4/9/24 shows bilateral pulmonary infiltrates throughout lung fields, L>R, confirming HCAP; however, no organism identified.   - UC from 4/9 also growing VRE; however, not believed to be infectious   - Originally completed IV antibiotic course: micafungin 4/7-4/12, meropenem and vanc 4/7-4/15.   - Repeat CT scan on 4/16 continues to show bilateral infiltrates with chest x-ray on 4/17  showing worsening lung fields concerning for ARDs 2/2 to TACO vs. hypervolemia vs. ongoing infectious process.   - Atypical pneumonia workup negative, including PJP PCR. Elevated galactomannan aspergillus.       Transplant Status:  - Decreased to status 7 for combined heart-kidney on 4/17/24 due to worsening pulmonary fields   - ABO status: O+  - CMV+/EBV+/Toxo-/Hep B-/Hep C-  - Prospective cross match with every donor offer  - Due to potential risk of hyperacute allograft rejection, induction plan will be 500mg solumedrol x 2 (followed by steroid taper) and thymoglobulin       Recommendations:  - Agree w/ transition to bicarb purge for impella & continuing systemic heparin for goal assays 0.3-0.4 to minimize bleeding risks   - Agree with ongoing discussion with CT surgeons regarding removal of impella given lack of support 2/2 poor placement, on going hemolysis & infection risk.   - Agree with goal negative 1-2L as hemodynamically tolerates over next 24hrs   - Agree with initiation of TPN for nutritional support  - Please discuss possible reinitiating IV antimicrobials, especially antifungals given elevated aspergillus marker.       I discussed status reduction with both Charla and her mother. Charla was upset and unfortunately believes her heart and kidney transplant will happen tomorrow depsite reiterations that she is now status 7. Her mother was understanding of the news and appreciated the conversation and explanation. She voiced her understanding that Charla remains critically ill at this time.     Continue excellent care per CTICU team.      Patient was examined and discussed with [facility] Trans[facility]iology attending physician, Dr. Sherman     Heart Transplant Team:   [employer] Secure Chat  [phone] during day shift  [phone] during night shift     Keith Jain, APRN-CNP

## 2024-04-17 NOTE — NURSING NOTE
CT Surgeon: Dr. Bright  CTICU Attending: Dr. Murrell  Palliative Care Attending: Dr. Chaudhary  Physical Therapy Present (Y/N): N  Perfusion Present (Y/N): Y     ECMO Indication: Cardiogenic shock   ECMO Cannula Configuration: Right fem fem [facility]     Changes made to Hemodynamic/Ventilator/ECMO Management: Pt., was intubated around 1530 post ecmo rounds for protection of airway. Pt.'s WOB has increased through out the day due to increase of bleeding from back of throat.      Circuit Concerns: None at this time  Bleeding Concerns: Epistaxis  Clotting/Ischemia Concerns: none  Anticoagulation Plan/Monitoring: systemic heparin off as well as Impella purge changed to bicarb.     Mobility Plan/Candidacy: Attempted pt today with patient. Unfortunately bleeding increased with moving which then led to increase coughing flow issues and increase WOB  Nutrition: Tpn started for very poor nutritional intake.  Patient/Family Concerns: neuro status, worry patient will not make it   Nursing Concerns: none     Patients Minimally Acceptable Outcome: JOSE  Barriers to decannulation/anticipated decannulation date: uncertain at this time,.

## 2024-04-17 NOTE — PROGRESS NOTES
Art Therapy Note    Charla Bowles     Therapy Session  Referral Type: New referral this admission  Visit Type: Follow-up visit  Session Start Time: 1517  Session End Time: 1520  Intervention Delivery: In-person  Family Present for Session: None  Number of staff members present: 1              Treatment/Interventions       Post-assessment  Total Session Time (min): 3 minutes    Narrative  Assessment Detail: Pt was awake with nurse next to bedside, when ATR visited and offered to hang calming pictures within her sight if she wanted. Pt agreed but asked ATR to return in a few minutes when nurse would be done.  Follow-up: ATR will f/u with pt soon, to complete art intervention proposed to pt.    Education Documentation  No documentation found.

## 2024-04-17 NOTE — PROGRESS NOTES
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 Following via daily rounds in CTICU.     Claribel Diaz (LSW, MSW)

## 2024-04-17 NOTE — SIGNIFICANT EVENT
Patient with blood by mouth lost approximately 400cc (mixed with sputum). Scoped by ENT with no bleeding in nasopharynx and no epistaxis.     Called to bedside for decreased flows on ECMO and acute change in mental status. At bedside patient was increasingly altered however responded to sternal rub and followed with all four extremities. Blood pressure maintained. Decision made to to emergently intubate patient due to inability to protect airway and concern for aspiration from oral bleeding. Bronchoscopy performed due to concern for diffuse alveolar hemorrhage as cause of bleeding which is ruled out. Dental consulted for evaluation of bleeding as potentially oral.     Decision for family care meeting tomorrow 4/18 for goals of care.

## 2024-04-17 NOTE — CONSULTS
"Nutrition Follow Up Assessment:   Nutrition Assessment    Reason for Assessment: TPN recommendations    Nutrition History:  Food and Nutrient History: Only able to tolerate bites of solid food 4/15. Since then nursing notes only really taking in juice/broth. Pt agitated this morning - unable to meet with pt. CTICU team reports pt refused re-insertion of NG upon their discussion. Now listed as status 7.       Anthropometrics:  Height: 154.9 cm (5' 0.98\")   Weight: 84 kg (185 lb 3 oz)   BMI (Calculated): 40.84  IBW/kg (Dietitian Calculated): 47.7 kg  Percent of IBW: 175 %  Adjusted Body Weight (kg): 57 kg    Nutrition Focused Physical Exam Findings:  > unable to enter room as pt has been agitated most of the morning per RN.   Visually pt presents more deconditioned, difficult to determine extent of losses by visual examination alone but suspect declines despite lack of formal NFPE.   Subcutaneous Fat Loss:   Orbital Fat Pads: Mild-Moderate (slight dark circles and slight hollowing) (visual)  Muscle Wasting:  Pectoralis (Clavicular Region): Mild-Moderate (some protrusion of clavicle) (visual)  Deltoid/Trapezius: Mild-Moderate (slight protrusion of acromion process) (visual)  Edema:  Edema: +1 trace  Physical Findings:  Skin: Positive (DTI to sacrum)    Nutrition Significant Labs:  CBC Trend:   Results from last 7 days   Lab Units 04/17/24  0847 04/17/24  0325 04/16/24  2254 04/16/24  1744   WBC AUTO x10*3/uL 21.5* 21.7* 21.9* 23.4*   RBC AUTO x10*6/uL 2.62* 2.16* 2.37* 2.10*   HEMOGLOBIN g/dL 8.0* 6.6* 7.0* 6.6*   HEMATOCRIT % 22.3* 17.7* 19.7* 18.2*   MCV fL 85 82 83 87   PLATELETS AUTO x10*3/uL 94* 51* 94* 78*    , A1C:  Lab Results   Component Value Date    HGBA1C 5.7 (H) 03/07/2024   , BG POCT trend:   Results from last 7 days   Lab Units 04/17/24  1129 04/17/24  0758 04/16/24  1807 04/16/24  0753 04/15/24  1611   POCT GLUCOSE mg/dL 112* 182* 169* 149* 199*    , TPN/PPN Labs:   Results from last 7 days   Lab Units " 04/17/24  0325 04/16/24  0034 04/15/24  1356 04/15/24  0737 04/15/24  0108   GLUCOSE mg/dL 147* 101* 189*  --  158*   POTASSIUM mmol/L 4.3 4.4 4.2  --  4.4   PHOSPHORUS mg/dL 2.3* 1.8* 2.6  --  2.2*   MAGNESIUM mg/dL 2.82* 2.96* 2.88*  --  2.88*   SODIUM mmol/L 134* 133* 135*  --  136   CHLORIDE mmol/L 98 98 96*  --  96*   ALT U/L 39  --   --    < >  --    AST U/L 276*  --   --    < >  --    ALK PHOS U/L 185*  --   --    < >  --    BILIRUBIN TOTAL mg/dL 3.2*  --   --    < >  --     < > = values in this interval not displayed.        Nutrition Specific Medications:  calcium carbonate-vitamin D3, 1 tablet, oral, Daily  cyanocobalamin, 500 mcg, oral, Daily  darbepoetin floyd, 100 mcg, subcutaneous, q14 days  ferrous sulfate (325 mg ferrous sulfate), 65 mg of iron, oral, Daily with breakfast  folic acid, 1 mg, oral, Daily  insulin lispro, 0-15 Units, subcutaneous, TID with meals  levothyroxine, 90 mcg, intravenous, q24h TRICIA  lidocaine, 1 patch, transdermal, Daily  melatonin, 10 mg, oral, Daily  multivitamin with minerals, 1 tablet, oral, Daily  mupirocin, 1 Application, Each Nostril, BID  nystatin, 5 mL, Swish & Swallow, q6h  pantoprazole, 40 mg, intravenous, Daily  polyethylene glycol, 17 g, oral, Daily  predniSONE, 10 mg, oral, Daily  QUEtiapine, 50 mg, oral, BID  sennosides-docusate sodium, 2 tablet, oral, BID  sulfamethoxazole-trimethoprim, 80 mg of trimethoprim, oral, Daily  valGANciclovir, 450 mg, oral, q48h    Continuous medications  dexmedeTOMIDine, 0.1-1.5 mcg/kg/hr, Last Rate: Stopped (04/16/24 1300)  heparin, 0-4,000 Units/hr, Last Rate: 600 Units/hr (04/17/24 1200)  lactated Ringer's, 5 mL/hr, Last Rate: 5 mL/hr (04/17/24 1200)  PrismaSol 4/2.5, 2,400 mL/hr, Last Rate: 2,400 mL/hr (04/13/24 1700)  sodium bicarbonate infusion Impella Purge 25 mEq/1000 mL D5W, 10 mL/hr    I/O:   Last BM Date: 04/17/24; Stool Appearance: Loose (04/16/24 2000)    Dietary Orders (From admission, onward)       Start     Ordered     04/17/24 0023  NPO Diet Except: Sips with meds; Effective now  Diet effective now        Question:  Except:  Answer:  Sips with meds    04/17/24 0024    04/15/24 1358  Oral nutritional supplements  Until discontinued        Comments: Berry flavor only ; allow pt to order from extended stay menu   Question Answer Comment   Deliver with All meals    Select supplement: Ensure Clear        04/15/24 1357                     Estimated Needs:   Total Energy Estimated Needs (kCal):  (5162-9074)  Method for Estimating Needs: MSJ = 1479 kcals/d x1.2-1.3 stress factor ; also =31-33 kcals/kg of adjusted BW or =21-23 kcals/kg of actual BW (84 kgs)  Total Protein Estimated Needs (g):  (75-95)  Method for Estimating Needs: 1.5-2.0 gm/kg+ ideal BW  Total Fluid Estimated Needs (mL):  (per team)           Nutrition Diagnosis   Malnutrition Diagnosis  Patient has Malnutrition Diagnosis: Yes  Diagnosis Status: Declining  Malnutrition Diagnosis: Moderate malnutrition related to acute disease or injury  As Evidenced by: pt's reported intake likely meeting <75% of estimated needs for at least 7 days, previous 5% weight loss in 1 month, LOS 42.  Additional Assessment Information: suspect pt is severely malnourished however lacking concrete clinical criteria       Nutrition Interventions/Recommendations         Nutrition Prescription:  For TPN, begin 8/14 @ 30mls/hr for at least 24-48 hours.   Replete any low lytes as indicated before advancing to GOAL @ 50 mls/hr.   Add 250mls SMOF lipids/d -- infuse over 12 hours per protocol.    IV thiamin 100mg daily vs course of high dose.        Nutrition Interventions:   8/14 PN @ 50 + SMOF = 1455 kcals, 96gm protein with 34% total kcals from lipids  Labs:   Triglycerides:  now and each week  Renal panel and magnesium daily  Repeat electrolytes as needed  LFTs: now and each week         Nutrition Monitoring and Evaluation   Food/Nutrient Related History Monitoring  Monitoring and Evaluation Plan:  Enteral and parenteral nutrition intake  Enteral and Parenteral Nutrition Intake: Parenteral nutrition formula/solution  Criteria: PN tolerence without BG and electrolyte abnormalities                      Time Spent/Follow-up Reminder:   Time Spent (min): 45 minutes  Last Date of Nutrition Visit: 04/17/24  Nutrition Follow-Up Needed?: Dietitian to reassess per policy  Follow up Comment: TPN recs

## 2024-04-17 NOTE — PROGRESS NOTES
"        INPATIENT TRANSPLANT NEPHROLOGY PROGRESS NOTE          REASON FOR CONSULT:  Immunosuppressive medication management and nephrology related issues.    SUBJECTION:  CVVH Procedure note    ECMO  On pressors  Tolerated CVVH well    No acute event overnight.    PHYCISCAL EXAMINATION:    Visit Vitals  BP (!) 103/95   Pulse 109   Temp 35.6 °C (96.1 °F) (Skin)   Resp (!) 32   Ht 1.549 m (5' 0.98\")   Wt 84 kg (185 lb 3 oz)   SpO2 99%   BMI 35.01 kg/m²   OB Status Hysterectomy   Smoking Status Never   BSA 1.9 m²        04/15 1900 - 04/17 0659  In: 2596.7 [P.O.:400; I.V.:846.7]  Out: 1080      Weight change:     Constitutional:       General: She is not in acute distress.     Appearance: Normal appearance. She is ill-appearing and toxic-appearing.   HENT:      Head: Normocephalic.      Mouth/Throat:      Mouth: Mucous membranes are moist.   Eyes:      Extraocular Movements: Extraocular movements intact.      Pupils: Pupils are equal, round, and reactive to light.   Neck:      Comments: RIJ dialysis line present - CDI  Cardiovascular:      Rate and Rhythm: Regular rhythm. Tachycardia present.      Pulses: Normal pulses.      Heart sounds: Normal heart sounds.   Pulmonary:      Effort: Pulmonary effort is normal. No respiratory distress.      Breath sounds: Normal breath sounds.   Chest:      Comments: Right axillary impella site CDI   Abdominal:      General: Bowel sounds are normal.      Palpations: Abdomen is soft.      Tenderness: There is no abdominal tenderness.   Musculoskeletal:         General: Normal range of motion.      Cervical back: Normal range of motion.      Right lower leg: Edema present.      Left lower leg: Edema present.   Skin:     General: Skin is warm and dry.      Capillary Refill: Capillary refill takes 2 to 3 seconds.      Coloration: Skin is jaundiced.      Comments: Left groin ECMO cannulation site with drainage    Neurological:      General: No focal deficit present.      Mental Status: She " is alert and oriented to person, place, and time.   Psychiatric:         Behavior: Behavior normal. Behavior is cooperative.     MEDICATION LIST: REVIEWED    albumin human, 12.5 g, Once  albumin human, 12.5 g, Once  calcium carbonate-vitamin D3, 1 tablet, Daily  cyanocobalamin, 500 mcg, Daily  darbepoetin floyd, 100 mcg, q14 days  ferrous sulfate (325 mg ferrous sulfate), 65 mg of iron, Daily with breakfast  folic acid, 1 mg, Daily  insulin lispro, 0-15 Units, TID with meals  levothyroxine, 90 mcg, q24h TRICIA  lidocaine, 1 patch, Daily  melatonin, 10 mg, Daily  multivitamin with minerals, 1 tablet, Daily  mupirocin, 1 Application, BID  nystatin, 5 mL, q6h  oxygen, , Continuous - Inhalation  oxygen, , Continuous - Inhalation  pantoprazole, 40 mg, Daily  polyethylene glycol, 17 g, Daily  predniSONE, 10 mg, Daily  QUEtiapine, 50 mg, BID  [Held by provider] rosuvastatin, 10 mg, Nightly  sennosides-docusate sodium, 2 tablet, BID  sulfamethoxazole-trimethoprim, 80 mg of trimethoprim, Daily  valGANciclovir, 450 mg, q48h      dexmedeTOMIDine, Last Rate: 0.3 mcg/kg/hr (04/17/24 1400)  heparin, Last Rate: 600 Units/hr (04/17/24 1400)  [Held by provider] heparin Impella Purge 25 units/mL in 500 mL D5W, Last Rate: Stopped (04/17/24 1354)  lactated Ringer's, Last Rate: 5 mL/hr (04/17/24 1400)  PrismaSol 4/2.5, Last Rate: 2,400 mL/hr (04/13/24 1700)  sodium bicarbonate infusion Impella Purge 25 mEq/1000 mL D5W, Last Rate: 10 mL/hr (04/17/24 1400)      acetaminophen, 650 mg, q6h PRN  alteplase, 2 mg, PRN  bisacodyl, 10 mg, Daily PRN  calcium gluconate, 1 g, q6h PRN  calcium gluconate, 2 g, q6h PRN  dextrose, 25 g, q15 min PRN   Or  glucagon, 1 mg, q15 min PRN  hydrALAZINE, 5 mg, q4h PRN  hydrOXYzine HCL, 25 mg, q6h PRN  ipratropium-albuteroL, 3 mL, q6h PRN  artificial tears, 2 drop, PRN  magnesium sulfate, 2 g, q6h PRN  magnesium sulfate, 4 g, q6h PRN  microfibrllar collagen, , PRN  mupirocin, , BID PRN  naloxone, 0.2 mg, q5 min  PRN  ondansetron, 4 mg, q4h PRN  oxyCODONE, 5 mg, q6h PRN  sodium chloride, 1 spray, 4x daily PRN        ALLERGY:  Allergies   Allergen Reactions    Dapagliflozin GI bleeding and Bleeding     UTI    Urinary tract infection    Empagliflozin Unknown    Myfortic [Mycophenolate Sodium] GI Upset    Topiramate Nausea Only and Nausea/vomiting    Latex Rash       LABS:  Results for orders placed or performed during the hospital encounter of 02/15/24 (from the past 24 hour(s))   Heparin Assay, UFH   Result Value Ref Range    Heparin Unfractionated 0.4 See Comment Below for Therapeutic Ranges IU/mL   Blood Gas Arterial Full Panel   Result Value Ref Range    POCT pH, Arterial 7.38 7.38 - 7.42 pH    POCT pCO2, Arterial 27 (L) 38 - 42 mm Hg    POCT pO2, Arterial 56 (L) 85 - 95 mm Hg    POCT SO2, Arterial 95 94 - 100 %    POCT Oxy Hemoglobin, Arterial 87.9 (L) 94.0 - 98.0 %    POCT Hematocrit Calculated, Arterial 20.0 (L) 36.0 - 46.0 %    POCT Sodium, Arterial 131 (L) 136 - 145 mmol/L    POCT Potassium, Arterial 5.6 (H) 3.5 - 5.3 mmol/L    POCT Chloride, Arterial 101 98 - 107 mmol/L    POCT Ionized Calcium, Arterial 1.11 1.10 - 1.33 mmol/L    POCT Glucose, Arterial 217 (H) 74 - 99 mg/dL    POCT Lactate, Arterial 3.8 (H) 0.4 - 2.0 mmol/L    POCT Base Excess, Arterial -8.3 (L) -2.0 - 3.0 mmol/L    POCT HCO3 Calculated, Arterial 16.0 (L) 22.0 - 26.0 mmol/L    POCT Hemoglobin, Arterial 6.8 (L) 12.0 - 16.0 g/dL    POCT Anion Gap, Arterial 20 10 - 25 mmo/L    Patient Temperature 37.0 degrees Celsius    FiO2 21 %   CBC   Result Value Ref Range    WBC 23.4 (H) 4.4 - 11.3 x10*3/uL    nRBC 14.8 (H) 0.0 - 0.0 /100 WBCs    RBC 2.10 (L) 4.00 - 5.20 x10*6/uL    Hemoglobin 6.6 (L) 12.0 - 16.0 g/dL    Hematocrit 18.2 (L) 36.0 - 46.0 %    MCV 87 80 - 100 fL    MCH 31.4 26.0 - 34.0 pg    MCHC 36.3 (H) 32.0 - 36.0 g/dL    RDW 19.2 (H) 11.5 - 14.5 %    Platelets 78 (L) 150 - 450 x10*3/uL   POCT GLUCOSE   Result Value Ref Range    POCT Glucose 169  (H) 74 - 99 mg/dL   Prepare RBC: 1 Units, Leukocytes Reduced (CMV reduced risk)   Result Value Ref Range    PRODUCT CODE O5516E19     Unit Number [id number]     Unit ABO O     Unit RH POS     XM INTEP COMP     Dispense Status XM     Blood Expiration Date May 11, 2024 23:59 EDT     PRODUCT BLOOD TYPE 5100     UNIT VOLUME 281    Blood Gas Arterial Full Panel   Result Value Ref Range    POCT pH, Arterial 7.28 (L) 7.38 - 7.42 pH    POCT pCO2, Arterial 36 (L) 38 - 42 mm Hg    POCT pO2, Arterial 106 (H) 85 - 95 mm Hg    POCT SO2, Arterial 99 94 - 100 %    POCT Oxy Hemoglobin, Arterial 91.3 (L) 94.0 - 98.0 %    POCT Hematocrit Calculated, Arterial 20.0 (L) 36.0 - 46.0 %    POCT Sodium, Arterial 133 (L) 136 - 145 mmol/L    POCT Potassium, Arterial 4.8 3.5 - 5.3 mmol/L    POCT Chloride, Arterial 99 98 - 107 mmol/L    POCT Ionized Calcium, Arterial 1.10 1.10 - 1.33 mmol/L    POCT Glucose, Arterial 204 (H) 74 - 99 mg/dL    POCT Lactate, Arterial 2.8 (H) 0.4 - 2.0 mmol/L    POCT Base Excess, Arterial -9.0 (L) -2.0 - 3.0 mmol/L    POCT HCO3 Calculated, Arterial 16.9 (L) 22.0 - 26.0 mmol/L    POCT Hemoglobin, Arterial 6.7 (L) 12.0 - 16.0 g/dL    POCT Anion Gap, Arterial 22 10 - 25 mmo/L    Patient Temperature 37.0 degrees Celsius    FiO2 21 %   Blood Gas Arterial Full Panel   Result Value Ref Range    POCT pH, Arterial 7.31 (L) 7.38 - 7.42 pH    POCT pCO2, Arterial 44 (H) 38 - 42 mm Hg    POCT pO2, Arterial 112 (H) 85 - 95 mm Hg    POCT SO2, Arterial 99 94 - 100 %    POCT Oxy Hemoglobin, Arterial 92.0 (L) 94.0 - 98.0 %    POCT Hematocrit Calculated, Arterial 23.0 (L) 36.0 - 46.0 %    POCT Sodium, Arterial 134 (L) 136 - 145 mmol/L    POCT Potassium, Arterial 4.5 3.5 - 5.3 mmol/L    POCT Chloride, Arterial 101 98 - 107 mmol/L    POCT Ionized Calcium, Arterial 1.07 (L) 1.10 - 1.33 mmol/L    POCT Glucose, Arterial 127 (H) 74 - 99 mg/dL    POCT Lactate, Arterial 1.6 0.4 - 2.0 mmol/L    POCT Base Excess, Arterial -3.8 (L) -2.0 -  3.0 mmol/L    POCT HCO3 Calculated, Arterial 22.2 22.0 - 26.0 mmol/L    POCT Hemoglobin, Arterial 7.5 (L) 12.0 - 16.0 g/dL    POCT Anion Gap, Arterial 15 10 - 25 mmo/L    Patient Temperature 37.0 degrees Celsius    FiO2 21 %   Type And Screen   Result Value Ref Range    ABO TYPE O     Rh TYPE POS     ANTIBODY SCREEN NEG    Calcium, ionized   Result Value Ref Range    POCT Calcium, Ionized 1.04 (L) 1.1 - 1.33 mmol/L   CBC   Result Value Ref Range    WBC 21.9 (H) 4.4 - 11.3 x10*3/uL    nRBC 14.8 (H) 0.0 - 0.0 /100 WBCs    RBC 2.37 (L) 4.00 - 5.20 x10*6/uL    Hemoglobin 7.0 (L) 12.0 - 16.0 g/dL    Hematocrit 19.7 (L) 36.0 - 46.0 %    MCV 83 80 - 100 fL    MCH 29.5 26.0 - 34.0 pg    MCHC 35.5 32.0 - 36.0 g/dL    RDW 18.0 (H) 11.5 - 14.5 %    Platelets 94 (L) 150 - 450 x10*3/uL   Coagulation Screen   Result Value Ref Range    Protime 15.1 (H) 9.8 - 12.8 seconds    INR 1.3 (H) 0.9 - 1.1    aPTT 53 (H) 27 - 38 seconds   Fibrinogen   Result Value Ref Range    Fibrinogen 260 200 - 400 mg/dL   Cytomegalovirus IgG   Result Value Ref Range    Cytomegalovirus IgG Reactive (A) Nonreactive   HIV 1/2 Antigen/Antibody Screen with Reflex to Confirmation   Result Value Ref Range    HIV 1/2 Antigen/Antibody Screen with Reflex to Confirmation Nonreactive Nonreactive   SARS-COV-2 PCR   Result Value Ref Range    Coronavirus 2019, PCR Not Detected Not Detected   Cytomegalovirus IgG   Result Value Ref Range    Cytomegalovirus IgG Reactive (A) Nonreactive   Cresencio-Barr Virus Antibody Panel   Result Value Ref Range    EBV VCA, IgG  Positive (A) Negative    EBV VCA, IgM  Negative Negative    EBV Early Antigen Antibody, IgG Negative Negative    EBV Nuclear Antigen Antibody, IgG Positive (A) Negative   Hepatitis B Core Antibody, Total   Result Value Ref Range    Hepatitis B Core AB- Total Nonreactive Nonreactive   Hepatitis B Surface Antigen   Result Value Ref Range    Hepatitis B Surface AG Nonreactive Nonreactive   Hepatitis B Surface  Antibody   Result Value Ref Range    Hepatitis B Surface .3 (H) <10.0 mIU/mL   Hepatitis C Antibody   Result Value Ref Range    Hepatitis C AB Nonreactive Nonreactive   Toxoplasma IgG   Result Value Ref Range    Toxoplasma IgG Nonreactive Nonreactive   Varicella Zoster Antibody, IgG   Result Value Ref Range    Varicella Zoster, IgG Positive (A) Negative    Varicella Zoster, IgG Index 2.7 (H) <=0.8 IA   Reticulocytes   Result Value Ref Range    Retic % 2.2 (H) 0.5 - 2.0 %    Retic Absolute 0.047 0.018 - 0.083 x10*6/uL    Reticulocyte Hemoglobin 31 28 - 38 pg    Immature Retic fraction 38.2 (H) <=16.0 %   Haptoglobin   Result Value Ref Range    Haptoglobin <10 (L) 37 - 246 mg/dL   CBC and Auto Differential   Result Value Ref Range    WBC 21.7 (H) 4.4 - 11.3 x10*3/uL    nRBC 16.4 (H) 0.0 - 0.0 /100 WBCs    RBC 2.16 (L) 4.00 - 5.20 x10*6/uL    Hemoglobin 6.6 (L) 12.0 - 16.0 g/dL    Hematocrit 17.7 (L) 36.0 - 46.0 %    MCV 82 80 - 100 fL    MCH 30.6 26.0 - 34.0 pg    MCHC 37.3 (H) 32.0 - 36.0 g/dL    RDW 18.2 (H) 11.5 - 14.5 %    Platelets 51 (L) 150 - 450 x10*3/uL    Immature Granulocytes %, Automated 10.4 (H) 0.0 - 0.9 %    Immature Granulocytes Absolute, Automated 2.26 (H) 0.00 - 0.70 x10*3/uL   Lactate Dehydrogenase   Result Value Ref Range    LDH 3,827 (H) 84 - 246 U/L   Magnesium   Result Value Ref Range    Magnesium 2.82 (H) 1.60 - 2.40 mg/dL   Hepatic Function Panel   Result Value Ref Range    Albumin 4.4 3.4 - 5.0 g/dL    Bilirubin, Total 3.2 (H) 0.0 - 1.2 mg/dL    Bilirubin, Direct 0.5 (H) 0.0 - 0.3 mg/dL    Alkaline Phosphatase 185 (H) 33 - 110 U/L    ALT 39 7 - 45 U/L     (H) 9 - 39 U/L    Total Protein 6.3 (L) 6.4 - 8.2 g/dL   Phosphorus   Result Value Ref Range    Phosphorus 2.3 (L) 2.5 - 4.9 mg/dL   Basic Metabolic Panel   Result Value Ref Range    Glucose 147 (H) 74 - 99 mg/dL    Sodium 134 (L) 136 - 145 mmol/L    Potassium 4.3 3.5 - 5.3 mmol/L    Chloride 98 98 - 107 mmol/L    Bicarbonate 19  (L) 21 - 32 mmol/L    Anion Gap 21 (H) 10 - 20 mmol/L    Urea Nitrogen 23 6 - 23 mg/dL    Creatinine 0.68 0.50 - 1.05 mg/dL    eGFR >90 >60 mL/min/1.73m*2    Calcium 8.4 (L) 8.6 - 10.3 mg/dL   Manual Differential   Result Value Ref Range    Neutrophils %, Manual 65.6 40.0 - 80.0 %    Bands %, Manual 7.8 0.0 - 5.0 %    Lymphocytes %, Manual 3.1 13.0 - 44.0 %    Monocytes %, Manual 11.0 2.0 - 10.0 %    Eosinophils %, Manual 0.0 0.0 - 6.0 %    Basophils %, Manual 0.0 0.0 - 2.0 %    Metamyelocytes %, Manual 9.4 0.0 - 0.0 %    Myelocytes %, Manual 3.1 0.0 - 0.0 %    Seg Neutrophils Absolute, Manual 14.24 (H) 1.20 - 7.00 x10*3/uL    Bands Absolute, Manual 1.69 (H) 0.00 - 0.70 x10*3/uL    Lymphocytes Absolute, Manual 0.67 (L) 1.20 - 4.80 x10*3/uL    Monocytes Absolute, Manual 2.39 (H) 0.10 - 1.00 x10*3/uL    Eosinophils Absolute, Manual 0.00 0.00 - 0.70 x10*3/uL    Basophils Absolute, Manual 0.00 0.00 - 0.10 x10*3/uL    Metamyelocytes Absolute, Manual 2.04 0.00 - 0.00 x10*3/uL    Myelocytes Absolute, Manual 0.67 0.00 - 0.00 x10*3/uL    Total Cells Counted 64     Neutrophils Absolute, Manual 15.93 (H) 1.20 - 7.70 x10*3/uL    RBC Morphology See Below     RBC Fragments Few     Richton Cells Few    Heparin Assay, UFH   Result Value Ref Range    Heparin Unfractionated 0.3 See Comment Below for Therapeutic Ranges IU/mL   Blood Gas Arterial Full Panel   Result Value Ref Range    POCT pH, Arterial 7.33 (L) 7.38 - 7.42 pH    POCT pCO2, Arterial 40 38 - 42 mm Hg    POCT pO2, Arterial 155 (H) 85 - 95 mm Hg    POCT SO2, Arterial 100 94 - 100 %    POCT Oxy Hemoglobin, Arterial 91.5 (L) 94.0 - 98.0 %    POCT Hematocrit Calculated, Arterial 21.0 (L) 36.0 - 46.0 %    POCT Sodium, Arterial 135 (L) 136 - 145 mmol/L    POCT Potassium, Arterial 4.8 3.5 - 5.3 mmol/L    POCT Chloride, Arterial 101 98 - 107 mmol/L    POCT Ionized Calcium, Arterial 1.14 1.10 - 1.33 mmol/L    POCT Glucose, Arterial 153 (H) 74 - 99 mg/dL    POCT Lactate, Arterial 1.2  0.4 - 2.0 mmol/L    POCT Base Excess, Arterial -4.5 (L) -2.0 - 3.0 mmol/L    POCT HCO3 Calculated, Arterial 21.1 (L) 22.0 - 26.0 mmol/L    POCT Hemoglobin, Arterial 7.1 (L) 12.0 - 16.0 g/dL    POCT Anion Gap, Arterial 18 10 - 25 mmo/L    Patient Temperature 37.0 degrees Celsius    FiO2 21 %   ECMO ARTERIAL FULL PANEL   Result Value Ref Range    POCT pH, Arterial ECMO 7.36 Reference range not established pH    POCT pCO2, Arterial ECMO 38 Reference range not established mm Hg    POCT pO2,  Arterial ECMO 182 Reference range not established mm Hg    POCT SO2, Arterial ECMO 99 Reference range not established %    POCT Oxy Hemoglobin, Arterial ECMO 90.2 Reference range not established %    POCT Hematocrit Calculated, Arterial ECMO 21.0 (L) 36.0 - 46.0 %    POCT Sodium, Arterial  ECMO 134 (L) 136 - 145 mmol/L    POCT Potassium, Arterial  ECMO 4.4 3.5 - 5.3 mmol/L    POCT Chloride, Arterial  ECMO 101 98 - 107 mmol/L    POCT Ionized Calcium, Arterial  ECMO 1.09 (L) 1.10 - 1.33 mmol/L    POCT Glucose, Arterial  ECMO 143 (H) 74 - 99 mg/dL    POCT Lactate Arterial  ECMO 1.1 0.4 - 2.0 mmol/L    POCT Base Excess, Arterial ECMO -3.6 Reference range not established mmol/L    POCT HCO3 Calculated, Arterial ECMO 21.5 Reference range not established mmol/L    POCT Hemoglobin, Arterial  ECMO 7.0 (L) 12.0 - 16.0 g/dL    POCT Anion Gap, Arterial  ECMO 16 Reference range not established mmo/L    Patient Temperature 37.0 degrees Celsius    FiO2 80 %   ECMO VENOUS FULL PANEL   Result Value Ref Range    POCT pH, Venous ECMO 7.30 Reference range not established pH    POCT pCO2, Venous ECMO 46 Reference range not established mm Hg    POCT pO2,  Venous  ECMO 30 Reference range not established mm Hg    POCT SO2, Venous ECMO 62 Reference range not established %    POCT Oxy Hemoglobin, Venous ECMO 56.9 Reference range not established %    POCT Hematocrit Calculated, Venous ECMO 19.0 (L) 36.0 - 46.0 %    POCT Sodium, Venous ECMO 135 (L) 136 - 145  mmol/L    POCT Potassium, Venous ECMO 4.6 3.5 - 5.3 mmol/L    POCT Chloride, Venous ECMO 100 98 - 107 mmol/L    POCT Ionized Calcium, Venous ECMO 1.14 1.10 - 1.33 mmol/L    POCT Glucose, Venous ECMO 148 (H) 74 - 99 mg/dL    POCT Lactate Venous ECMO 1.1 0.4 - 2.0 mmol/L    POCT Base Excess, Venous ECMO -3.5 Reference range not established mmol/L    POCT HCO3 Calculated, Venous ECMO 22.6 Reference range not established mmol/L    POCT Hemoglobin, Venous ECMO 6.4 (LL) 12.0 - 16.0 g/dL    POCT Anion Gap, Venous ECMO 17 Reference range not established mmo/L    Patient Temperature 37.0 degrees Celsius    FiO2 80 %   Prepare RBC: 2 Units, Leukocytes Reduced (CMV reduced risk)   Result Value Ref Range    PRODUCT CODE B9319L61     Unit Number [id number]     Unit ABO O     Unit RH POS     XM INTEP COMP     Dispense Status IS     Blood Expiration Date May 10, 2024 23:59 EDT     PRODUCT BLOOD TYPE 5100     UNIT VOLUME 350     PRODUCT CODE [id number]     Unit Number [id number]     Unit ABO O     Unit RH POS     XM INTEP COMP     Dispense Status TR     Blood Expiration Date May 11, 2024 23:59 EDT     PRODUCT BLOOD TYPE 5100     UNIT VOLUME 350    POCT GLUCOSE   Result Value Ref Range    POCT Glucose 182 (H) 74 - 99 mg/dL   CBC   Result Value Ref Range    WBC 21.5 (H) 4.4 - 11.3 x10*3/uL    nRBC 17.9 (H) 0.0 - 0.0 /100 WBCs    RBC 2.62 (L) 4.00 - 5.20 x10*6/uL    Hemoglobin 8.0 (L) 12.0 - 16.0 g/dL    Hematocrit 22.3 (L) 36.0 - 46.0 %    MCV 85 80 - 100 fL    MCH 30.5 26.0 - 34.0 pg    MCHC 35.9 32.0 - 36.0 g/dL    RDW 18.2 (H) 11.5 - 14.5 %    Platelets 94 (L) 150 - 450 x10*3/uL   CBC   Result Value Ref Range    WBC 20.9 (H) 4.4 - 11.3 x10*3/uL    nRBC 21.3 (H) 0.0 - 0.0 /100 WBCs    RBC 2.88 (L) 4.00 - 5.20 x10*6/uL    Hemoglobin 8.7 (L) 12.0 - 16.0 g/dL    Hematocrit 23.6 (L) 36.0 - 46.0 %    MCV 82 80 - 100 fL    MCH 30.2 26.0 - 34.0 pg    MCHC 36.9 (H) 32.0 - 36.0 g/dL    RDW 16.3 (H) 11.5 - 14.5 %    Platelets 74 (L)  150 - 450 x10*3/uL   Calcium, ionized   Result Value Ref Range    POCT Calcium, Ionized 1.10 1.1 - 1.33 mmol/L   Blood Gas Arterial Full Panel   Result Value Ref Range    POCT pH, Arterial 7.25 (LL) 7.38 - 7.42 pH    POCT pCO2, Arterial 56 (H) 38 - 42 mm Hg    POCT pO2, Arterial 85 85 - 95 mm Hg    POCT SO2, Arterial 100 94 - 100 %    POCT Oxy Hemoglobin, Arterial 90.7 (L) 94.0 - 98.0 %    POCT Hematocrit Calculated, Arterial 27.0 (L) 36.0 - 46.0 %    POCT Sodium, Arterial 133 (L) 136 - 145 mmol/L    POCT Potassium, Arterial 4.3 3.5 - 5.3 mmol/L    POCT Chloride, Arterial 102 98 - 107 mmol/L    POCT Ionized Calcium, Arterial 1.12 1.10 - 1.33 mmol/L    POCT Glucose, Arterial 95 74 - 99 mg/dL    POCT Lactate, Arterial 1.0 0.4 - 2.0 mmol/L    POCT Base Excess, Arterial -2.9 (L) -2.0 - 3.0 mmol/L    POCT HCO3 Calculated, Arterial 24.6 22.0 - 26.0 mmol/L    POCT Hemoglobin, Arterial 9.0 (L) 12.0 - 16.0 g/dL    POCT Anion Gap, Arterial 11 10 - 25 mmo/L    Patient Temperature 37.0 degrees Celsius    FiO2 21 %   POCT GLUCOSE   Result Value Ref Range    POCT Glucose 112 (H) 74 - 99 mg/dL   Blood Gas Arterial Full Panel Unsolicited   Result Value Ref Range    POCT pH, Arterial 7.34 (L) 7.38 - 7.42 pH    POCT pCO2, Arterial 44 (H) 38 - 42 mm Hg    POCT pO2, Arterial 134 (H) 85 - 95 mm Hg    POCT SO2, Arterial 100 94 - 100 %    POCT Oxy Hemoglobin, Arterial 91.5 (L) 94.0 - 98.0 %    POCT Hematocrit Calculated, Arterial 26.0 (L) 36.0 - 46.0 %    POCT Sodium, Arterial 132 (L) 136 - 145 mmol/L    POCT Potassium, Arterial 4.7 3.5 - 5.3 mmol/L    POCT Chloride, Arterial 104 98 - 107 mmol/L    POCT Ionized Calcium, Arterial 1.10 1.10 - 1.33 mmol/L    POCT Glucose, Arterial 100 (H) 74 - 99 mg/dL    POCT Lactate, Arterial 1.0 0.4 - 2.0 mmol/L    POCT Base Excess, Arterial -2.0 -2.0 - 3.0 mmol/L    POCT HCO3 Calculated, Arterial 23.7 22.0 - 26.0 mmol/L    POCT Hemoglobin, Arterial 8.7 (L) 12.0 - 16.0 g/dL    POCT Anion Gap, Arterial 9  (L) 10 - 25 mmo/L    Patient Temperature 37.0 degrees Celsius   Blood Gas Arterial Full Panel   Result Value Ref Range    POCT pH, Arterial 7.34 (L) 7.38 - 7.42 pH    POCT pCO2, Arterial 44 (H) 38 - 42 mm Hg    POCT pO2, Arterial 121 (H) 85 - 95 mm Hg    POCT SO2, Arterial 100 94 - 100 %    POCT Oxy Hemoglobin, Arterial 91.1 (L) 94.0 - 98.0 %    POCT Hematocrit Calculated, Arterial 25.0 (L) 36.0 - 46.0 %    POCT Sodium, Arterial 133 (L) 136 - 145 mmol/L    POCT Potassium, Arterial 4.8 3.5 - 5.3 mmol/L    POCT Chloride, Arterial 103 98 - 107 mmol/L    POCT Ionized Calcium, Arterial 1.10 1.10 - 1.33 mmol/L    POCT Glucose, Arterial 110 (H) 74 - 99 mg/dL    POCT Lactate, Arterial 1.0 0.4 - 2.0 mmol/L    POCT Base Excess, Arterial -2.0 -2.0 - 3.0 mmol/L    POCT HCO3 Calculated, Arterial 23.7 22.0 - 26.0 mmol/L    POCT Hemoglobin, Arterial 8.3 (L) 12.0 - 16.0 g/dL    POCT Anion Gap, Arterial 11 10 - 25 mmo/L    Patient Temperature 37.0 degrees Celsius    FiO2 21 %     *Note: Due to a large number of results and/or encounters for the requested time period, some results have not been displayed. A complete set of results can be found in Results Review.        ASSESSMENT AND PLAN:    Ms. Yimi Bowles is a 34 y.o. female who underwent a kidney transplant surgery  on 3/31/2022 (Heart).    Principal Problem:    Cardiogenic shock (CMS/HCC)  Active Problems:    Heart transplant recipient (CMS/[facility])    ESRD (end stage renal disease) on dialysis (CMS/[facility])    HIRAM (acute kidney injury) (CMS/[facility])    Acute passive congestion of liver    Hyponatremia    Iron deficiency anemia    Abdominal pain    Cardiac transplant rejection (CMS/HCC)    Acute combined systolic (congestive) and diastolic (congestive) heart failure (CMS/HCC)      CRRT Management Note:    - Patient was seen and examined while on CVVH   - Lab was reviewed  - CVVH order was reviewed  -Discussed with primary team  -Discussed with RN   - Will continue CVVH for now  -Daily  evaluation for indications for CVVH and will convert it to regular IHD once the patient is more hemodynamically stable.    Heart and Kidney transplant Candidacy :   Will meet the OPTN eligibility criteria for kidney transplant on 3/29/24 for sustained HIRAM over 6 weeks. Pre tx work up - per heart tx team.   Note:  GFR 2/17/24=23  CVVH started 2/19 and has remained on CVVH still  She is approved for CANDIDATE PENDING HEART TRANSPLANT APPROVAL at kidney transplant selection committee 3/28/24    [Sustained acute kidney injury : At least one of the following, or a combination of both of the following, for the last 6 weeks:    That the candidate has been on dialysis at least once every 7 days.    That the candidate has a measured or calculated CrCl or GFR less than or equal to 25 mL/min at least once every 7 days]      * Case was discussed with primary team.  For questions, please contact transplant nephrology page x [phone]    Russ Bergeron    Transplant Nephrologist

## 2024-04-17 NOTE — PROGRESS NOTES
"        INPATIENT TRANSPLANT NEPHROLOGY PROGRESS NOTE          REASON FOR CONSULT:  CVVH management    SUBJECTION:  CVVH Procedure note    ECMO  On pressors  Tolerated CVVH well    No acute event overnight.    PHYCISCAL EXAMINATION:    Visit Vitals  BP (!) 103/95   Pulse 109   Temp 35.6 °C (96.1 °F) (Skin)   Resp (!) 32   Ht 1.549 m (5' 0.98\")   Wt 84 kg (185 lb 3 oz)   SpO2 99%   BMI 35.01 kg/m²   OB Status Hysterectomy   Smoking Status Never   BSA 1.9 m²        04/15 1900 - 04/17 0659  In: 2596.7 [P.O.:400; I.V.:846.7]  Out: 1080      Weight change:     Constitutional:       General: She is not in acute distress.     Appearance: Normal appearance. She is ill-appearing and toxic-appearing.   HENT:      Head: Normocephalic.      Mouth/Throat:      Mouth: Mucous membranes are moist.   Eyes:      Extraocular Movements: Extraocular movements intact.      Pupils: Pupils are equal, round, and reactive to light.   Neck:      Comments: RIJ dialysis line present - CDI  Cardiovascular:      Rate and Rhythm: Regular rhythm. Tachycardia present.      Pulses: Normal pulses.      Heart sounds: Normal heart sounds.   Pulmonary:      Effort: Pulmonary effort is normal. No respiratory distress.      Breath sounds: Normal breath sounds.   Chest:      Comments: Right axillary impella site CDI   Abdominal:      General: Bowel sounds are normal.      Palpations: Abdomen is soft.      Tenderness: There is no abdominal tenderness.   Musculoskeletal:         General: Normal range of motion.      Cervical back: Normal range of motion.      Right lower leg: Edema present.      Left lower leg: Edema present.   Skin:     General: Skin is warm and dry.      Capillary Refill: Capillary refill takes 2 to 3 seconds.      Coloration: Skin is jaundiced.      Comments: Left groin ECMO cannulation site with drainage    Neurological:      General: No focal deficit present.      Mental Status: She is alert and oriented to person, place, and time. "   Psychiatric:         Behavior: Behavior normal. Behavior is cooperative.     MEDICATION LIST: REVIEWED    albumin human, 12.5 g, Once  albumin human, 12.5 g, Once  calcium carbonate-vitamin D3, 1 tablet, Daily  cyanocobalamin, 500 mcg, Daily  darbepoetin floyd, 100 mcg, q14 days  ferrous sulfate (325 mg ferrous sulfate), 65 mg of iron, Daily with breakfast  folic acid, 1 mg, Daily  insulin lispro, 0-15 Units, TID with meals  levothyroxine, 90 mcg, q24h TRICIA  lidocaine, 1 patch, Daily  melatonin, 10 mg, Daily  multivitamin with minerals, 1 tablet, Daily  mupirocin, 1 Application, BID  nystatin, 5 mL, q6h  oxygen, , Continuous - Inhalation  oxygen, , Continuous - Inhalation  pantoprazole, 40 mg, Daily  polyethylene glycol, 17 g, Daily  predniSONE, 10 mg, Daily  QUEtiapine, 50 mg, BID  [Held by provider] rosuvastatin, 10 mg, Nightly  sennosides-docusate sodium, 2 tablet, BID  sulfamethoxazole-trimethoprim, 80 mg of trimethoprim, Daily  valGANciclovir, 450 mg, q48h      dexmedeTOMIDine, Last Rate: 0.3 mcg/kg/hr (04/17/24 1400)  heparin, Last Rate: 600 Units/hr (04/17/24 1400)  [Held by provider] heparin Impella Purge 25 units/mL in 500 mL D5W, Last Rate: Stopped (04/17/24 1354)  lactated Ringer's, Last Rate: 5 mL/hr (04/17/24 1400)  PrismaSol 4/2.5, Last Rate: 2,400 mL/hr (04/13/24 1700)  sodium bicarbonate infusion Impella Purge 25 mEq/1000 mL D5W, Last Rate: 10 mL/hr (04/17/24 1400)      acetaminophen, 650 mg, q6h PRN  alteplase, 2 mg, PRN  bisacodyl, 10 mg, Daily PRN  calcium gluconate, 1 g, q6h PRN  calcium gluconate, 2 g, q6h PRN  dextrose, 25 g, q15 min PRN   Or  glucagon, 1 mg, q15 min PRN  hydrALAZINE, 5 mg, q4h PRN  hydrOXYzine HCL, 25 mg, q6h PRN  ipratropium-albuteroL, 3 mL, q6h PRN  artificial tears, 2 drop, PRN  magnesium sulfate, 2 g, q6h PRN  magnesium sulfate, 4 g, q6h PRN  microfibrllar collagen, , PRN  mupirocin, , BID PRN  naloxone, 0.2 mg, q5 min PRN  ondansetron, 4 mg, q4h PRN  oxyCODONE, 5 mg, q6h  PRN  sodium chloride, 1 spray, 4x daily PRN        ALLERGY:  Allergies   Allergen Reactions    Dapagliflozin GI bleeding and Bleeding     UTI    Urinary tract infection    Empagliflozin Unknown    Myfortic [Mycophenolate Sodium] GI Upset    Topiramate Nausea Only and Nausea/vomiting    Latex Rash       LABS:  Results for orders placed or performed during the hospital encounter of 02/15/24 (from the past 24 hour(s))   Heparin Assay, UFH   Result Value Ref Range    Heparin Unfractionated 0.4 See Comment Below for Therapeutic Ranges IU/mL   Blood Gas Arterial Full Panel   Result Value Ref Range    POCT pH, Arterial 7.38 7.38 - 7.42 pH    POCT pCO2, Arterial 27 (L) 38 - 42 mm Hg    POCT pO2, Arterial 56 (L) 85 - 95 mm Hg    POCT SO2, Arterial 95 94 - 100 %    POCT Oxy Hemoglobin, Arterial 87.9 (L) 94.0 - 98.0 %    POCT Hematocrit Calculated, Arterial 20.0 (L) 36.0 - 46.0 %    POCT Sodium, Arterial 131 (L) 136 - 145 mmol/L    POCT Potassium, Arterial 5.6 (H) 3.5 - 5.3 mmol/L    POCT Chloride, Arterial 101 98 - 107 mmol/L    POCT Ionized Calcium, Arterial 1.11 1.10 - 1.33 mmol/L    POCT Glucose, Arterial 217 (H) 74 - 99 mg/dL    POCT Lactate, Arterial 3.8 (H) 0.4 - 2.0 mmol/L    POCT Base Excess, Arterial -8.3 (L) -2.0 - 3.0 mmol/L    POCT HCO3 Calculated, Arterial 16.0 (L) 22.0 - 26.0 mmol/L    POCT Hemoglobin, Arterial 6.8 (L) 12.0 - 16.0 g/dL    POCT Anion Gap, Arterial 20 10 - 25 mmo/L    Patient Temperature 37.0 degrees Celsius    FiO2 21 %   CBC   Result Value Ref Range    WBC 23.4 (H) 4.4 - 11.3 x10*3/uL    nRBC 14.8 (H) 0.0 - 0.0 /100 WBCs    RBC 2.10 (L) 4.00 - 5.20 x10*6/uL    Hemoglobin 6.6 (L) 12.0 - 16.0 g/dL    Hematocrit 18.2 (L) 36.0 - 46.0 %    MCV 87 80 - 100 fL    MCH 31.4 26.0 - 34.0 pg    MCHC 36.3 (H) 32.0 - 36.0 g/dL    RDW 19.2 (H) 11.5 - 14.5 %    Platelets 78 (L) 150 - 450 x10*3/uL   POCT GLUCOSE   Result Value Ref Range    POCT Glucose 169 (H) 74 - 99 mg/dL   Prepare RBC: 1 Units, Leukocytes  Reduced (CMV reduced risk)   Result Value Ref Range    PRODUCT CODE X0141B52     Unit Number [id number]     Unit ABO O     Unit RH POS     XM INTEP COMP     Dispense Status XM     Blood Expiration Date May 11, 2024 23:59 EDT     PRODUCT BLOOD TYPE 5100     UNIT VOLUME 281    Blood Gas Arterial Full Panel   Result Value Ref Range    POCT pH, Arterial 7.28 (L) 7.38 - 7.42 pH    POCT pCO2, Arterial 36 (L) 38 - 42 mm Hg    POCT pO2, Arterial 106 (H) 85 - 95 mm Hg    POCT SO2, Arterial 99 94 - 100 %    POCT Oxy Hemoglobin, Arterial 91.3 (L) 94.0 - 98.0 %    POCT Hematocrit Calculated, Arterial 20.0 (L) 36.0 - 46.0 %    POCT Sodium, Arterial 133 (L) 136 - 145 mmol/L    POCT Potassium, Arterial 4.8 3.5 - 5.3 mmol/L    POCT Chloride, Arterial 99 98 - 107 mmol/L    POCT Ionized Calcium, Arterial 1.10 1.10 - 1.33 mmol/L    POCT Glucose, Arterial 204 (H) 74 - 99 mg/dL    POCT Lactate, Arterial 2.8 (H) 0.4 - 2.0 mmol/L    POCT Base Excess, Arterial -9.0 (L) -2.0 - 3.0 mmol/L    POCT HCO3 Calculated, Arterial 16.9 (L) 22.0 - 26.0 mmol/L    POCT Hemoglobin, Arterial 6.7 (L) 12.0 - 16.0 g/dL    POCT Anion Gap, Arterial 22 10 - 25 mmo/L    Patient Temperature 37.0 degrees Celsius    FiO2 21 %   Blood Gas Arterial Full Panel   Result Value Ref Range    POCT pH, Arterial 7.31 (L) 7.38 - 7.42 pH    POCT pCO2, Arterial 44 (H) 38 - 42 mm Hg    POCT pO2, Arterial 112 (H) 85 - 95 mm Hg    POCT SO2, Arterial 99 94 - 100 %    POCT Oxy Hemoglobin, Arterial 92.0 (L) 94.0 - 98.0 %    POCT Hematocrit Calculated, Arterial 23.0 (L) 36.0 - 46.0 %    POCT Sodium, Arterial 134 (L) 136 - 145 mmol/L    POCT Potassium, Arterial 4.5 3.5 - 5.3 mmol/L    POCT Chloride, Arterial 101 98 - 107 mmol/L    POCT Ionized Calcium, Arterial 1.07 (L) 1.10 - 1.33 mmol/L    POCT Glucose, Arterial 127 (H) 74 - 99 mg/dL    POCT Lactate, Arterial 1.6 0.4 - 2.0 mmol/L    POCT Base Excess, Arterial -3.8 (L) -2.0 - 3.0 mmol/L    POCT HCO3 Calculated, Arterial 22.2 22.0  - 26.0 mmol/L    POCT Hemoglobin, Arterial 7.5 (L) 12.0 - 16.0 g/dL    POCT Anion Gap, Arterial 15 10 - 25 mmo/L    Patient Temperature 37.0 degrees Celsius    FiO2 21 %   Type And Screen   Result Value Ref Range    ABO TYPE O     Rh TYPE POS     ANTIBODY SCREEN NEG    Calcium, ionized   Result Value Ref Range    POCT Calcium, Ionized 1.04 (L) 1.1 - 1.33 mmol/L   CBC   Result Value Ref Range    WBC 21.9 (H) 4.4 - 11.3 x10*3/uL    nRBC 14.8 (H) 0.0 - 0.0 /100 WBCs    RBC 2.37 (L) 4.00 - 5.20 x10*6/uL    Hemoglobin 7.0 (L) 12.0 - 16.0 g/dL    Hematocrit 19.7 (L) 36.0 - 46.0 %    MCV 83 80 - 100 fL    MCH 29.5 26.0 - 34.0 pg    MCHC 35.5 32.0 - 36.0 g/dL    RDW 18.0 (H) 11.5 - 14.5 %    Platelets 94 (L) 150 - 450 x10*3/uL   Coagulation Screen   Result Value Ref Range    Protime 15.1 (H) 9.8 - 12.8 seconds    INR 1.3 (H) 0.9 - 1.1    aPTT 53 (H) 27 - 38 seconds   Fibrinogen   Result Value Ref Range    Fibrinogen 260 200 - 400 mg/dL   Cytomegalovirus IgG   Result Value Ref Range    Cytomegalovirus IgG Reactive (A) Nonreactive   HIV 1/2 Antigen/Antibody Screen with Reflex to Confirmation   Result Value Ref Range    HIV 1/2 Antigen/Antibody Screen with Reflex to Confirmation Nonreactive Nonreactive   SARS-COV-2 PCR   Result Value Ref Range    Coronavirus 2019, PCR Not Detected Not Detected   Cytomegalovirus IgG   Result Value Ref Range    Cytomegalovirus IgG Reactive (A) Nonreactive   Cresencio-Barr Virus Antibody Panel   Result Value Ref Range    EBV VCA, IgG  Positive (A) Negative    EBV VCA, IgM  Negative Negative    EBV Early Antigen Antibody, IgG Negative Negative    EBV Nuclear Antigen Antibody, IgG Positive (A) Negative   Hepatitis B Core Antibody, Total   Result Value Ref Range    Hepatitis B Core AB- Total Nonreactive Nonreactive   Hepatitis B Surface Antigen   Result Value Ref Range    Hepatitis B Surface AG Nonreactive Nonreactive   Hepatitis B Surface Antibody   Result Value Ref Range    Hepatitis B Surface AB  730.3 (H) <10.0 mIU/mL   Hepatitis C Antibody   Result Value Ref Range    Hepatitis C AB Nonreactive Nonreactive   Toxoplasma IgG   Result Value Ref Range    Toxoplasma IgG Nonreactive Nonreactive   Varicella Zoster Antibody, IgG   Result Value Ref Range    Varicella Zoster, IgG Positive (A) Negative    Varicella Zoster, IgG Index 2.7 (H) <=0.8 IA   Reticulocytes   Result Value Ref Range    Retic % 2.2 (H) 0.5 - 2.0 %    Retic Absolute 0.047 0.018 - 0.083 x10*6/uL    Reticulocyte Hemoglobin 31 28 - 38 pg    Immature Retic fraction 38.2 (H) <=16.0 %   Haptoglobin   Result Value Ref Range    Haptoglobin <10 (L) 37 - 246 mg/dL   CBC and Auto Differential   Result Value Ref Range    WBC 21.7 (H) 4.4 - 11.3 x10*3/uL    nRBC 16.4 (H) 0.0 - 0.0 /100 WBCs    RBC 2.16 (L) 4.00 - 5.20 x10*6/uL    Hemoglobin 6.6 (L) 12.0 - 16.0 g/dL    Hematocrit 17.7 (L) 36.0 - 46.0 %    MCV 82 80 - 100 fL    MCH 30.6 26.0 - 34.0 pg    MCHC 37.3 (H) 32.0 - 36.0 g/dL    RDW 18.2 (H) 11.5 - 14.5 %    Platelets 51 (L) 150 - 450 x10*3/uL    Immature Granulocytes %, Automated 10.4 (H) 0.0 - 0.9 %    Immature Granulocytes Absolute, Automated 2.26 (H) 0.00 - 0.70 x10*3/uL   Lactate Dehydrogenase   Result Value Ref Range    LDH 3,827 (H) 84 - 246 U/L   Magnesium   Result Value Ref Range    Magnesium 2.82 (H) 1.60 - 2.40 mg/dL   Hepatic Function Panel   Result Value Ref Range    Albumin 4.4 3.4 - 5.0 g/dL    Bilirubin, Total 3.2 (H) 0.0 - 1.2 mg/dL    Bilirubin, Direct 0.5 (H) 0.0 - 0.3 mg/dL    Alkaline Phosphatase 185 (H) 33 - 110 U/L    ALT 39 7 - 45 U/L     (H) 9 - 39 U/L    Total Protein 6.3 (L) 6.4 - 8.2 g/dL   Phosphorus   Result Value Ref Range    Phosphorus 2.3 (L) 2.5 - 4.9 mg/dL   Basic Metabolic Panel   Result Value Ref Range    Glucose 147 (H) 74 - 99 mg/dL    Sodium 134 (L) 136 - 145 mmol/L    Potassium 4.3 3.5 - 5.3 mmol/L    Chloride 98 98 - 107 mmol/L    Bicarbonate 19 (L) 21 - 32 mmol/L    Anion Gap 21 (H) 10 - 20 mmol/L     Urea Nitrogen 23 6 - 23 mg/dL    Creatinine 0.68 0.50 - 1.05 mg/dL    eGFR >90 >60 mL/min/1.73m*2    Calcium 8.4 (L) 8.6 - 10.3 mg/dL   Manual Differential   Result Value Ref Range    Neutrophils %, Manual 65.6 40.0 - 80.0 %    Bands %, Manual 7.8 0.0 - 5.0 %    Lymphocytes %, Manual 3.1 13.0 - 44.0 %    Monocytes %, Manual 11.0 2.0 - 10.0 %    Eosinophils %, Manual 0.0 0.0 - 6.0 %    Basophils %, Manual 0.0 0.0 - 2.0 %    Metamyelocytes %, Manual 9.4 0.0 - 0.0 %    Myelocytes %, Manual 3.1 0.0 - 0.0 %    Seg Neutrophils Absolute, Manual 14.24 (H) 1.20 - 7.00 x10*3/uL    Bands Absolute, Manual 1.69 (H) 0.00 - 0.70 x10*3/uL    Lymphocytes Absolute, Manual 0.67 (L) 1.20 - 4.80 x10*3/uL    Monocytes Absolute, Manual 2.39 (H) 0.10 - 1.00 x10*3/uL    Eosinophils Absolute, Manual 0.00 0.00 - 0.70 x10*3/uL    Basophils Absolute, Manual 0.00 0.00 - 0.10 x10*3/uL    Metamyelocytes Absolute, Manual 2.04 0.00 - 0.00 x10*3/uL    Myelocytes Absolute, Manual 0.67 0.00 - 0.00 x10*3/uL    Total Cells Counted 64     Neutrophils Absolute, Manual 15.93 (H) 1.20 - 7.70 x10*3/uL    RBC Morphology See Below     RBC Fragments Few     Alex Cells Few    Heparin Assay, UFH   Result Value Ref Range    Heparin Unfractionated 0.3 See Comment Below for Therapeutic Ranges IU/mL   Blood Gas Arterial Full Panel   Result Value Ref Range    POCT pH, Arterial 7.33 (L) 7.38 - 7.42 pH    POCT pCO2, Arterial 40 38 - 42 mm Hg    POCT pO2, Arterial 155 (H) 85 - 95 mm Hg    POCT SO2, Arterial 100 94 - 100 %    POCT Oxy Hemoglobin, Arterial 91.5 (L) 94.0 - 98.0 %    POCT Hematocrit Calculated, Arterial 21.0 (L) 36.0 - 46.0 %    POCT Sodium, Arterial 135 (L) 136 - 145 mmol/L    POCT Potassium, Arterial 4.8 3.5 - 5.3 mmol/L    POCT Chloride, Arterial 101 98 - 107 mmol/L    POCT Ionized Calcium, Arterial 1.14 1.10 - 1.33 mmol/L    POCT Glucose, Arterial 153 (H) 74 - 99 mg/dL    POCT Lactate, Arterial 1.2 0.4 - 2.0 mmol/L    POCT Base Excess, Arterial -4.5 (L)  -2.0 - 3.0 mmol/L    POCT HCO3 Calculated, Arterial 21.1 (L) 22.0 - 26.0 mmol/L    POCT Hemoglobin, Arterial 7.1 (L) 12.0 - 16.0 g/dL    POCT Anion Gap, Arterial 18 10 - 25 mmo/L    Patient Temperature 37.0 degrees Celsius    FiO2 21 %   ECMO ARTERIAL FULL PANEL   Result Value Ref Range    POCT pH, Arterial ECMO 7.36 Reference range not established pH    POCT pCO2, Arterial ECMO 38 Reference range not established mm Hg    POCT pO2,  Arterial ECMO 182 Reference range not established mm Hg    POCT SO2, Arterial ECMO 99 Reference range not established %    POCT Oxy Hemoglobin, Arterial ECMO 90.2 Reference range not established %    POCT Hematocrit Calculated, Arterial ECMO 21.0 (L) 36.0 - 46.0 %    POCT Sodium, Arterial  ECMO 134 (L) 136 - 145 mmol/L    POCT Potassium, Arterial  ECMO 4.4 3.5 - 5.3 mmol/L    POCT Chloride, Arterial  ECMO 101 98 - 107 mmol/L    POCT Ionized Calcium, Arterial  ECMO 1.09 (L) 1.10 - 1.33 mmol/L    POCT Glucose, Arterial  ECMO 143 (H) 74 - 99 mg/dL    POCT Lactate Arterial  ECMO 1.1 0.4 - 2.0 mmol/L    POCT Base Excess, Arterial ECMO -3.6 Reference range not established mmol/L    POCT HCO3 Calculated, Arterial ECMO 21.5 Reference range not established mmol/L    POCT Hemoglobin, Arterial  ECMO 7.0 (L) 12.0 - 16.0 g/dL    POCT Anion Gap, Arterial  ECMO 16 Reference range not established mmo/L    Patient Temperature 37.0 degrees Celsius    FiO2 80 %   ECMO VENOUS FULL PANEL   Result Value Ref Range    POCT pH, Venous ECMO 7.30 Reference range not established pH    POCT pCO2, Venous ECMO 46 Reference range not established mm Hg    POCT pO2,  Venous  ECMO 30 Reference range not established mm Hg    POCT SO2, Venous ECMO 62 Reference range not established %    POCT Oxy Hemoglobin, Venous ECMO 56.9 Reference range not established %    POCT Hematocrit Calculated, Venous ECMO 19.0 (L) 36.0 - 46.0 %    POCT Sodium, Venous ECMO 135 (L) 136 - 145 mmol/L    POCT Potassium, Venous ECMO 4.6 3.5 - 5.3  mmol/L    POCT Chloride, Venous ECMO 100 98 - 107 mmol/L    POCT Ionized Calcium, Venous ECMO 1.14 1.10 - 1.33 mmol/L    POCT Glucose, Venous ECMO 148 (H) 74 - 99 mg/dL    POCT Lactate Venous ECMO 1.1 0.4 - 2.0 mmol/L    POCT Base Excess, Venous ECMO -3.5 Reference range not established mmol/L    POCT HCO3 Calculated, Venous ECMO 22.6 Reference range not established mmol/L    POCT Hemoglobin, Venous ECMO 6.4 (LL) 12.0 - 16.0 g/dL    POCT Anion Gap, Venous ECMO 17 Reference range not established mmo/L    Patient Temperature 37.0 degrees Celsius    FiO2 80 %   Prepare RBC: 2 Units, Leukocytes Reduced (CMV reduced risk)   Result Value Ref Range    PRODUCT CODE L3003S32     Unit Number [id number]     Unit ABO O     Unit RH POS     XM INTEP COMP     Dispense Status IS     Blood Expiration Date May 10, 2024 23:59 EDT     PRODUCT BLOOD TYPE 5100     UNIT VOLUME 350     PRODUCT CODE M5582K12     Unit Number [id number]     Unit ABO O     Unit RH POS     XM INTEP COMP     Dispense Status TR     Blood Expiration Date May 11, 2024 23:59 EDT     PRODUCT BLOOD TYPE 5100     UNIT VOLUME 350    POCT GLUCOSE   Result Value Ref Range    POCT Glucose 182 (H) 74 - 99 mg/dL   CBC   Result Value Ref Range    WBC 21.5 (H) 4.4 - 11.3 x10*3/uL    nRBC 17.9 (H) 0.0 - 0.0 /100 WBCs    RBC 2.62 (L) 4.00 - 5.20 x10*6/uL    Hemoglobin 8.0 (L) 12.0 - 16.0 g/dL    Hematocrit 22.3 (L) 36.0 - 46.0 %    MCV 85 80 - 100 fL    MCH 30.5 26.0 - 34.0 pg    MCHC 35.9 32.0 - 36.0 g/dL    RDW 18.2 (H) 11.5 - 14.5 %    Platelets 94 (L) 150 - 450 x10*3/uL   CBC   Result Value Ref Range    WBC 20.9 (H) 4.4 - 11.3 x10*3/uL    nRBC 21.3 (H) 0.0 - 0.0 /100 WBCs    RBC 2.88 (L) 4.00 - 5.20 x10*6/uL    Hemoglobin 8.7 (L) 12.0 - 16.0 g/dL    Hematocrit 23.6 (L) 36.0 - 46.0 %    MCV 82 80 - 100 fL    MCH 30.2 26.0 - 34.0 pg    MCHC 36.9 (H) 32.0 - 36.0 g/dL    RDW 16.3 (H) 11.5 - 14.5 %    Platelets 74 (L) 150 - 450 x10*3/uL   Calcium, ionized   Result Value  Ref Range    POCT Calcium, Ionized 1.10 1.1 - 1.33 mmol/L   Blood Gas Arterial Full Panel   Result Value Ref Range    POCT pH, Arterial 7.25 (LL) 7.38 - 7.42 pH    POCT pCO2, Arterial 56 (H) 38 - 42 mm Hg    POCT pO2, Arterial 85 85 - 95 mm Hg    POCT SO2, Arterial 100 94 - 100 %    POCT Oxy Hemoglobin, Arterial 90.7 (L) 94.0 - 98.0 %    POCT Hematocrit Calculated, Arterial 27.0 (L) 36.0 - 46.0 %    POCT Sodium, Arterial 133 (L) 136 - 145 mmol/L    POCT Potassium, Arterial 4.3 3.5 - 5.3 mmol/L    POCT Chloride, Arterial 102 98 - 107 mmol/L    POCT Ionized Calcium, Arterial 1.12 1.10 - 1.33 mmol/L    POCT Glucose, Arterial 95 74 - 99 mg/dL    POCT Lactate, Arterial 1.0 0.4 - 2.0 mmol/L    POCT Base Excess, Arterial -2.9 (L) -2.0 - 3.0 mmol/L    POCT HCO3 Calculated, Arterial 24.6 22.0 - 26.0 mmol/L    POCT Hemoglobin, Arterial 9.0 (L) 12.0 - 16.0 g/dL    POCT Anion Gap, Arterial 11 10 - 25 mmo/L    Patient Temperature 37.0 degrees Celsius    FiO2 21 %   POCT GLUCOSE   Result Value Ref Range    POCT Glucose 112 (H) 74 - 99 mg/dL   Blood Gas Arterial Full Panel Unsolicited   Result Value Ref Range    POCT pH, Arterial 7.34 (L) 7.38 - 7.42 pH    POCT pCO2, Arterial 44 (H) 38 - 42 mm Hg    POCT pO2, Arterial 134 (H) 85 - 95 mm Hg    POCT SO2, Arterial 100 94 - 100 %    POCT Oxy Hemoglobin, Arterial 91.5 (L) 94.0 - 98.0 %    POCT Hematocrit Calculated, Arterial 26.0 (L) 36.0 - 46.0 %    POCT Sodium, Arterial 132 (L) 136 - 145 mmol/L    POCT Potassium, Arterial 4.7 3.5 - 5.3 mmol/L    POCT Chloride, Arterial 104 98 - 107 mmol/L    POCT Ionized Calcium, Arterial 1.10 1.10 - 1.33 mmol/L    POCT Glucose, Arterial 100 (H) 74 - 99 mg/dL    POCT Lactate, Arterial 1.0 0.4 - 2.0 mmol/L    POCT Base Excess, Arterial -2.0 -2.0 - 3.0 mmol/L    POCT HCO3 Calculated, Arterial 23.7 22.0 - 26.0 mmol/L    POCT Hemoglobin, Arterial 8.7 (L) 12.0 - 16.0 g/dL    POCT Anion Gap, Arterial 9 (L) 10 - 25 mmo/L    Patient Temperature 37.0 degrees  Celsius   Blood Gas Arterial Full Panel   Result Value Ref Range    POCT pH, Arterial 7.34 (L) 7.38 - 7.42 pH    POCT pCO2, Arterial 44 (H) 38 - 42 mm Hg    POCT pO2, Arterial 121 (H) 85 - 95 mm Hg    POCT SO2, Arterial 100 94 - 100 %    POCT Oxy Hemoglobin, Arterial 91.1 (L) 94.0 - 98.0 %    POCT Hematocrit Calculated, Arterial 25.0 (L) 36.0 - 46.0 %    POCT Sodium, Arterial 133 (L) 136 - 145 mmol/L    POCT Potassium, Arterial 4.8 3.5 - 5.3 mmol/L    POCT Chloride, Arterial 103 98 - 107 mmol/L    POCT Ionized Calcium, Arterial 1.10 1.10 - 1.33 mmol/L    POCT Glucose, Arterial 110 (H) 74 - 99 mg/dL    POCT Lactate, Arterial 1.0 0.4 - 2.0 mmol/L    POCT Base Excess, Arterial -2.0 -2.0 - 3.0 mmol/L    POCT HCO3 Calculated, Arterial 23.7 22.0 - 26.0 mmol/L    POCT Hemoglobin, Arterial 8.3 (L) 12.0 - 16.0 g/dL    POCT Anion Gap, Arterial 11 10 - 25 mmo/L    Patient Temperature 37.0 degrees Celsius    FiO2 21 %     *Note: Due to a large number of results and/or encounters for the requested time period, some results have not been displayed. A complete set of results can be found in Results Review.        ASSESSMENT AND PLAN:    Ms. Yimi Bowles is a 34 y.o. female who underwent a kidney transplant surgery  on 3/31/2022 (Heart).    Principal Problem:    Cardiogenic shock (CMS/HCC)  Active Problems:    Heart transplant recipient (CMS/[facility])    ESRD (end stage renal disease) on dialysis (CMS/[facility])    HIRAM (acute kidney injury) (CMS/[facility]ly [facility])    Acute passive congestion of liver    Hyponatremia    Iron deficiency anemia    Abdominal pain    Cardiac transplant rejection (CMS/[facility])    Acute combined systolic (congestive) and diastolic (congestive) heart failure (CMS/HCC)      CRRT Management Note:    - Patient was seen and examined while on CVVH   - Lab was reviewed  - CVVH order was reviewed  -Discussed with primary team  -Discussed with RN   - Will continue CVVH for now  -Daily evaluation for indications for CVVH and will convert it to  regular IHD once the patient is more hemodynamically stable.    Heart and Kidney transplant Candidacy :   Will meet the OPTN eligibility criteria for kidney transplant on 3/29/24 for sustained HIRAM over 6 weeks. Pre tx work up - per heart tx team.   Note:  GFR 2/17/24=23  CVVH started 2/19 and has remained on CVVH still  She is approved for CANDIDATE PENDING HEART TRANSPLANT APPROVAL at kidney transplant selection committee 3/28/24    [Sustained acute kidney injury : At least one of the following, or a combination of both of the following, for the last 6 weeks:    That the candidate has been on dialysis at least once every 7 days.    That the candidate has a measured or calculated CrCl or GFR less than or equal to 25 mL/min at least once every 7 days]      * Case was discussed with primary team.  For questions, please contact transplant nephrology page x [phone]    Russ Bergeron    Transplant Nephrologist

## 2024-04-17 NOTE — PROCEDURES
Intubation    Date/Time: 4/17/2024 4:05 PM    Performed by: Kayla Demarco MD  Authorized by: Kayla Demarco MD    Consent:     Consent obtained:  Emergent situation and verbal    Consent given by:  Patient and parent    Risks discussed:  Aspiration  Pre-procedure details:     Indications: airway protection and altered consciousness      Patient status:  Altered mental status    Pharmacologic strategy: DSI      Induction agents:  Etomidate    Paralytics:  Rocuronium  Procedure details:     CPR in progress: no      Number of attempts:  1  Successful intubation attempt details:     Intubation technique: video assisted      Bougie used: no      Tube size (mm):  7.5    Tube visualized through cords: yes    Placement assessment:     ETT at teeth/gumline (cm):  23cm    Tube secured with:  ETT torrez    Breath sounds:  Equal    Placement verification: colorimetric ETCO2    Post-procedure details:     Procedure completion:  Tolerated  Comments:      No complications. Pending chest xray.

## 2024-04-17 NOTE — PROGRESS NOTES
Charla Bowles is a 34 y.o. female on day 62 of admission presenting with Cardiogenic shock (Multi).    Patient requires ECMO support. Drainage cannula site, cannula, pump, oxygenator, return cannula and return cannula site clinically inspected. RPM and sweep reviewed and adjusted appropriate to clinical context. Anticoagulation status and intensity discussed. Plan for weaning, next clinical steps discussed with team and family. Discussed with cardiothoracic surgeon, perfusion, palliative care and physical/occupational therapy    ECMO Indication: Acute rejction of heart transplant c/b cardiogenic shock  ECMO Cannula Configuration: Fem Fem VA ECMO  ECMO circuit run from drainage cannula to pump to oxygenator to return cannula: Yes  Presence of cannula bleeding: No  Presence of oxygenator clot: No  Pre/post PaO2 adequate: Yes  LV Unloading/Pulse Pressure: Impella 5.5  Distal Perfusion: Present  Anticoagulation Plan/Monitoring: Xa 0.3  Mobility Plan/Candidacy: Working with PT  Path to decannulation/anticipated decannulation date:Uncertain  ECMO:     Most Recent Range Past 24hrs   Flow 3.69 Patient Flow (L/min)  Min: 2.58  Max: 3.87   Speed 4100 Circuit Flow (RPM)  Min: 4100  Max: 4101   Sweep 5 Sweep Gas Flow Rate (L/min)  Min: 4  Max: 5       dexmedeTOMIDine, 0.1-1.5 mcg/kg/hr, Last Rate: Stopped (04/16/24 1300)  heparin, 0-4,000 Units/hr, Last Rate: 600 Units/hr (04/17/24 1100)  [Held by provider] heparin Impella Purge 25 units/mL in 500 mL D5W, 10 mL/hr, Last Rate: 10 mL/hr (04/17/24 1100)  lactated Ringer's, 5 mL/hr, Last Rate: 5 mL/hr (04/17/24 1100)  PrismaSol 4/2.5, 2,400 mL/hr, Last Rate: 2,400 mL/hr (04/13/24 1700)  sodium bicarbonate infusion Impella Purge 25 mEq/1000 mL D5W, 10 mL/hr                        Demetrio Murrell MD

## 2024-04-17 NOTE — PROGRESS NOTES
CTICU Progress Note  Charla Bowles/[id number]    Admit Date: 2/15/2024  Hospital Length of Stay: 62   ICU Length of Stay: 20d 12h   CT SURGEON: Dr. Witt    SUBJECTIVE:   Hgb 6.6 received 2u PRBCs with increase to 8.0  Decreased to status 7    MEDICATIONS  Infusions:  dexmedeTOMIDine, Last Rate: Stopped (04/16/24 1300)  heparin, Last Rate: 600 Units/hr (04/17/24 0700)  heparin Impella Purge 25 units/mL in 500 mL D5W, Last Rate: 10 mL/hr (04/17/24 0700)  lactated Ringer's, Last Rate: 5 mL/hr (04/17/24 0700)  PrismaSol 4/2.5, Last Rate: 2,400 mL/hr (04/13/24 1700)      Scheduled:  calcium carbonate-vitamin D3, 1 tablet, Daily  cyanocobalamin, 500 mcg, Daily  darbepoetin floyd, 100 mcg, q14 days  ferrous sulfate (325 mg ferrous sulfate), 65 mg of iron, Daily with breakfast  folic acid, 1 mg, Daily  insulin lispro, 0-15 Units, TID with meals  levothyroxine, 90 mcg, q24h TIRCIA  lidocaine, 1 patch, Daily  melatonin, 10 mg, Daily  multivitamin with minerals, 1 tablet, Daily  mupirocin, 1 Application, BID  nystatin, 5 mL, q6h  oxygen, , Continuous - Inhalation  oxygen, , Continuous - Inhalation  pantoprazole, 40 mg, Daily  polyethylene glycol, 17 g, Daily  predniSONE, 10 mg, Daily  QUEtiapine, 50 mg, Nightly  [Held by provider] rosuvastatin, 10 mg, Nightly  sennosides-docusate sodium, 2 tablet, BID  sulfamethoxazole-trimethoprim, 80 mg of trimethoprim, Daily  valGANciclovir, 450 mg, q48h      PRN:  acetaminophen, 650 mg, q6h PRN  alteplase, 2 mg, PRN  bisacodyl, 10 mg, Daily PRN  calcium gluconate, 1 g, q6h PRN  calcium gluconate, 2 g, q6h PRN  dextrose, 25 g, q15 min PRN   Or  glucagon, 1 mg, q15 min PRN  hydrALAZINE, 5 mg, q4h PRN  hydrOXYzine HCL, 25 mg, q6h PRN  ipratropium-albuteroL, 3 mL, q6h PRN  artificial tears, 2 drop, PRN  magnesium sulfate, 2 g, q6h PRN  magnesium sulfate, 4 g, q6h PRN  microfibrllar collagen, , PRN  mupirocin, , BID PRN  naloxone, 0.2 mg, q5 min PRN  ondansetron, 4 mg, q4h PRN  oxyCODONE,  "5 mg, q6h PRN  sodium chloride, 1 spray, 4x daily PRN        PHYSICAL EXAM:   Visit Vitals  BP (!) 101/95   Pulse 109   Temp 36.9 °C (98.4 °F) (Temporal)   Resp (!) 44   Ht 1.549 m (5' 0.98\")   Wt 84 kg (185 lb 3 oz)   SpO2 93%   BMI 35.01 kg/m²   OB Status Hysterectomy   Smoking Status Never   BSA 1.9 m²     Wt Readings from Last 5 Encounters:   04/06/24 84 kg (185 lb 3 oz)   12/07/23 92.1 kg (203 lb)   12/01/23 93 kg (205 lb)   11/29/23 92.9 kg (204 lb 12.8 oz)   11/09/23 91.3 kg (201 lb 3.2 oz)     INTAKE/OUTPUT:  I/O last 3 completed shifts:  In: 2596.7 (30.9 mL/kg) [P.O.:400; I.V.:846.7 (10.1 mL/kg); Blood:1250; IV Piggyback:100]  Out: 1080 (12.9 mL/kg) [Other:1080]  Weight: 84 kg        LDA:  ECMO Cannulation Peripheral Right;Femoral artery;Femoral vein (Active)   Placement Date/Time: 03/27/24 1700   ECMO Type: Peripheral  Cannulation Site: Right;Femoral artery;Femoral vein  Line Securement: Line secured in 2 locations;Securement device;Sutures   Number of days: 10       Arterial Line 03/27/24 Right Radial (Active)   Placement Date/Time: 03/27/24 1545   Orientation: Right  Location: Radial   Number of days: 10       Ventricular Assist Device Impella 5.5 (Active)   Placement Date/Time: 03/01/24 1956   Placed by: Dr Viramontes  Hand Hygiene Completed: Yes  Site Location: Axilla right  VAD Type: Left ventricular assist device  VAD Brand: Impella 5.5   Number of days: 36       Hemodialysis Cath 04/06/24 Triple lumen Right Non-tunneled catheter Jugular (Active)   Placement Date/Time: 04/06/24 1500   Hand Hygiene Completed: Yes  Site Prep: Usual sterile procedure followed  Site Prep Agent has Completely Dried Before Insertion: Yes  All 5 Sterile Barriers Used (Gloves, Gown, Cap, Mask, Large Sterile Drape): Yes ...   Number of days: 0     Physical Exam:   - CONSTITUTION: Critically ill on MCS  - NEUROLOGIC: A+O and CAM positive today, generally less confused. following in all 4 extremities. Physically deconditioned  - " CARDIOVASCULAR: ST, R fem VA ECMO, no bleeding at site. R axillary impella, no bleeding at site. No s/s infection at either site. +distal signals in bilateral lower extremities  - RESPIRATORY: Diminished bilateral lung sounds, symmetric chest expansion  - GI: Abdomen soft, non tender, non distended, +bowel sounds.  Diarrhea, FMS in place  - : Anuric, on CVVH via ECMO circuit  - EXTREMITIES: Warm and dry, no peripheral edema  - SKIN: Left femoral skin breakdown with dressing in place  - PSYCHIATRIC: Calm     Images: CXR c/f  pulmonary edema persisting but improving    Invasive Hemodynamics:    Most Recent Range Past 24hrs   BP (Art) 98/82 Arterial Line BP 1  Min: 71/58  Max: 108/67   MAP(Art) 89 mmHg Arterial Line MAP 1 (mmHg)   Min: 63 mmHg  Max: 89 mmHg   RA/CVP   No data recorded   PA 41/34 No data recorded   PA(mean) 37 mmHg No data recorded   CO 5.5 L/min No data recorded   CI 2.8 L/min/m2 No data recorded   Mixed Venous 43.6 % SVO2 (%)  Min: 40.8 %  Max: 47.8 %   SVR  1115 (dyne*sec)/cm5 No data recorded     ECMO:     Most Recent Range Past 24hrs   Flow 3.71 Patient Flow (L/min)  Min: 2.45  Max: 3.87   Speed 4100 Circuit Flow (RPM)  Min: 3975  Max: 4101   Sweep 5 Sweep Gas Flow Rate (L/min)  Min: 4  Max: 5      Impella:      Most Recent Range Past 24hrs   Performance Level 2 P Level  Min: 2   Min taken time: 04/17/24 0700  Max: 2   Max taken time: 04/17/24 0700   Flow (L/min) 1.4 Flow (L/min)  Min: 1.2   Min taken time: 04/17/24 0400  Max: 2   Max taken time: 04/17/24 0000   Motor Current 125/82 Motor Current  Min: 119/80   Min taken time: 04/17/24 0400  Max: 157/87   Max taken time: 04/16/24 0900   Placement Signal Yes  Placement OK could not be evaluated. This SmartLink does not work with rows of the type: Custom List   Purge (mmHg) 486 Purge Pressure (mmHg)  Min: 347   Min taken time: 04/16/24 1800  Max: 614   Max taken time: 04/17/24 0000   Purge rate (mL/hr) 11.2 Purge Rate (mL/hr)  Min: 10   Min taken  time: 04/16/24 1700  Max: 11.2   Max taken time: 04/17/24 0700        Daily Risk Screen:  Dialysis/Hemapheresis    Assessment/Plan   34F with a PMHx sig for stage D HFrEF (PPCM) s/p ICD s/p CardioMEMs, hypothyroidism 2/2 thyroidectomy, obesity s/p gastric bypass (2017), and SLE who is s/p OHT (3/31/2022) with a post-OHT course complicated by RIJ/RUE DVTs, leukopenia, left groin seroma, and asymptomatic 2R ACR (11/2022) treated with steroids, s/p total hysterectomy (2023), Flu A (1/2024).     She presented to [facility] ED on 2/15/24 with complaints of N/V x 3 days and inability to keep medications down. Found to have acute systolic heart failure with primary graft failure and cardiogenic shock. Treated for suspected acute cellular vs. acute antibody mediated rejection; however, multiple cardiac biopsies negative for signs of rejection or other causes of graft failure. Her course has been complicated by multiple MCS devices for refractory cardiogenic shock (right femoral IABP: 2/18/24 - 3/1/24, Left femoral VA ECMO: 2/19/24 - 2/29/24, Right axillary impella 5.5: 3/1/24 - remains in place, 2nd right femoral VA ECMO: 3/27/24 - remains in place), ARF requiring RRT, acute liver injury, intubation due to volume overload in setting of pulmonary edema, severe hemolysis 2/2 to impella malposition, mild CMV viremia, bilateral provoked IJ DVTs, multiple episodes of epistaxis & coagulopathies requiring ongoing blood product transfusions.        Charla was transferred to CTICU on 3/27/24 following right femoral VA ECMO placement due to worsening cardiogenic shock and multi-organ failure despite Impella 5.5 support and multiple inotropes. She was listed Status 1 for heart/kidney on 4/2, however was deactivated (status 7) due to c/f sepsis. Relisted Status 1 4/16. She remains critically ill in the CTICU on MCS with ECMO and an Impella as well as CVVH.       NEURO:  She vacillates between being neuro intact with intermittent delirium  and anxiety though is much improved over last few days. Insomnia supported with multimodals and REST protocol.  CAM negative this AM.  Sitting at side of bed and standing with PT- max assist to get into position but then min assist to maintain. Increased anxiety. Precedex nightly for sleep and intermittently throughout day with anxiety attacks. Additional AM dose of seroquel this AM with agitation.  -->  - Serial neuro and pain assessments  - PRN tylenol  - Continue lidoderm patch for back pain  - PRN oxy 5mg Q6   - Continue melatonin 10 mg HS   - Increase to seroquel 50mg to BID  - May use precedex at bedtime for sleep if needed, can consider clonidine taper with increased requirements and anxiety with cessation in AM if BP will tolerate  - PRN hydroxyzine  - PT/OT Consult; mobilize as able  - CAM ICU score qshift. Sleep/wake cycle hygiene  - REST protocol (CXR and labs after 6am)      ENT: Multiple episodes of epistaxis with interventions by ENT. Most recently in OR 3/27 with L nare packed. Packing removed 4/1. -->  - appreciate ENT recs  - PRN ocean spray and mupiricin for dry nasal passages  - PRN afrin      Cardiac: Patient has a history of heart transplant in March of 2022 with primary graft dysfunction treated for acute cellular and antibody rejection without improvement with negative biopsy with multiple MCS devices for refractory cardiogenic shock (right femoral IABP: 2/18/24 - 3/1/24; Left femoral VA ECMO: 2/19/24 - 2/29/24; Right axillary impella 5.5: 3/1/24 - ; 2nd right femoral VA ECMO: 3/27/24 - ). Elevated LDH and difficulty with flows despite multiple attempts at repositioning Impella previously.   Current ECMO settings ~3L with FiO2 95% and sweep 4; Impella 5.5 P2 with flows 1.5 LPM. LDH increasing. Remains sinus tachy and normotensive. -->  - Maintain goal MAP 70-90  - Continue full BiV support with ECMO  - Continue Impella at P2  - Holding rosuvastatin with mild uptrending in LFT  - Trend daily  LDH   - Now status 7  - Hold home amlodipine     Vascular: 3/27 arterial duplex with proximal SFA occlusion with collateral flow. C/f LLE ischemia resolved. Feet warm with intact motor exam. -->     PULM: She has been intubated multiple times throughout hospital course for procedures. Acute respiratory failure persisting due to acute pulmonary edema and CT chest 4/9 with severe multifocal PNA. Cxr improving with some cardiogenic pulmonary edema. Gas exchange augmented by VA ECMO, occasionally on NC for comfort.  -->  - CXR daily  - Adjust sweep for pH 7.3 - 7.5  - Maintain SPO2 > 92%     GI: History of gastric bypass and MMF colitis. Shock liver originally resolved; most recently elevated r/t likely congestive hepatopathy that is improving on ECMO. Abdominal pain improved with reduced myfortic dosing. Previous c/f GI bleed, likely blood from epistaxis; no current evidence of GI bleeding. H pylori negative, fecal calprotectin (elevated at 380), fecal lactoferrin (Positive 03/23/24). Poor nutritional intake leading to vitamin K deficiency and elevated INR s/p Dobhoff placement by ENT 4/8 under fiberoptic visualization. Despite education on benefits of maintaining, NG tube removed 4/15 per patient request. Very poor nutritional intake. Abdominal pain this morning, mild tenderness in left lower quadrant. Dilated loop c/f ileus on KUB. Transiently elevated lactate. -->  - Continue calcitriol 0.5mg daily, multivitamin, calcium carbonate 1,250mg BID  - Continue PPI daily  - STAT CT c/a/p noncon to evaluate abd pain  - Patient with poor PO. Start TPN. OK for PO for patient comfort.   - Jaundiced today. Increasing LFTs. Follow up ammonia level with increasing agitation.   - Restart daily miralax     :  No history, baseline Cr 1.2-1.3. HIRAM originally on CVVH then with renal recovery but then again worsened and now dialysis dependent and failed diuretic challenges. Euvolemic. Mild hypophosphatemia.  -->  - RFP as clinically  indicated, replete electrolytes per CTICU protocol.   - Continue CVVH with goal negative 500 cc  - Nephrology Following     ENDO: History of hypothyroidism and prediabetes. TSH elevated originally to malabsorption then with dosing adjusted by endo; TSH (3/17): 20.74, T4 1.11, T3: 1.4, improved 4/1; TSH 10.13, T4 0.70. 4/8: TSH 19.48, T3 0.8, T4 0.68. Steroid induced hyperglycemia acceptable glycemic control on SSI. -->  - Maintain BG <180 with hypoglycemia protocol  - Continue SSI #1 AC/HS   - Continue Synthroid 75 mcg IV daily with concern for malabsorption  - Recheck TFT's 4/23 (ordered)   - Appreciate Endocrine Consult      HEME: History of iron deficiency anemia and remote DVT; again with acute DVT LIJ and SVT left cephalic vein and right cephalic vein. Acute blood loss anemia with repeated transfusions with significant hemolysis but no evidence of active bleeding; last received RBC 4/5. Stable thrombocytopenia persisting; last PF4 negative 3/30. No recent transfusion requirement. Coagulopathy r/t likely poor nutrition resolved with vit K x 3 doses (last 4/10). Heparin infusion and heparin purge, therapeutic heparin assay.  -->  - Continue ferrous sulfate daily  - Avoid unnecessarily transfusing, HGB > 7.0  - Continue systemic heparin with subtherapeutic heparin assay and aptt  - Trend coags daily  - SCDs for DVT prophylaxis  - Aranesp every 2 weeks  - Last type and screen: 4/14     Rejection/prophylaxis: S/p heart transplant 3/31/2022. Induction basiliximab. Donor HCV -, toxo -/-, CMV -/+. Mild ACR with +CD4 and negative HLAs however clinical presentation concern for acute cellular vs AMR rejection/stuttering rejection completed courses of thymo/PLEX/IVIG without clinical improvement.  With consideration to progressive graft failure and concern for infection limiting re-transplant at this time, we are holding tacro and myfortic (4/9 - ).  - Steroids: Continue PO prednisone 10 mg daily  - Hold Tacrolimus: 4.0 mg  AM/3.5 mg PM w/ daily levels drawn @ 0600  - Hold Tacrolimus goal troughs: 8-10  - Hold Myfortic acid: 360mg BID (Not starting MMF d/t hx of colitis)  - Antifungals: nystatin 500,000units Q6  - Antivirals: valcyte 450mg Q48hrs  - Anti PCP & Toxoplasmosis: continue SS Bactrim with c/f PNA     ID: C/f previous yeast infection. BC 3/27 NGTD final. MRSA screen negative 3/28. Episode of hypotension on 4/1, pan cultured and empiric Vanco/Zosyn started.  Patient was shivering 4/3, concern for risk of infection. Blood cultures sent 4/3, NGTD. Zosyn (4/1- 4/7) then broadened to Susan (4/7 - 4/15) and vanco (4/6-4/15) and natalie started (4/7 - 4/12). CT chest 4/9 demonstrates severe multifocal PNA. UA culture with enterococcus but difficult to define as infection given anuric state.  Beta-D glucan mildly elevated 195 thought to be 2/2 recent IVIG though could also be indicative of PJP. Persistent leukocytosis without clear source if infection. Cultures negative.  -->  - Trend temp q4h  - Holding antibiotics for now  - Follow up culture results  - Re-engage ID for positive aspergillosis. Not currently on antibiotics.   linezolid for VRE with ID--> do not treat per ID. Recommending repeat UA but no urine to send.  - PJP negative     Skin: Left groin wound from previous ECMO with wound consulted. Wound cx with NG 3/15.   - Preventative Mepilex dressings in place on sacrum and heels  - Change preventative Mepilex weekly or more frequently as indicated (when moist/soiled)   - Every shift skin assessment per nursing and weekly ICU skin rounds  - Moisture barrier to be applied with christopher care  - Active skin problems addressed with nursing on daily rounds  - wound recs from note 3/15 ordered      Proph:  SCDs  Systemic heparin & heparin purge   PPI     G: Lines  RIJ Trialysis - 4/6  R radial maxwell 4/11  PIV x2    Restraints: Not indicated    Code status: Full Code    I personally spent 45 minutes of critical care time directly and  personally managing the patient exclusive of separately billable procedures.     A,B,C,D,E,F,G: reviewed.    A&P reviewed on multidisciplinary critical care rounds. Plan for multidisciplinary discussion today to discuss trajectory.      Dispo: CTICU care for now.    CTICU TEAM PHONE [id number]

## 2024-04-17 NOTE — PROGRESS NOTES
Charla Bowles is a 34 y.o. female on day 62 of admission presenting with Cardiogenic shock (Multi).    I have seen the patient either independently or with an associated resident physician or advanced practice provider    Overnight Events: Was relisted to status 1, now status 7 given poor respiratory function. Delirious this morning    Neuro: Delirium with waxing and waning neuro status. Is sleeping at night, with seroquel and melatonin. Will allow extra dose of seroquel to assist. Qtc 370. Out of bed, standing at side with PT.    Cardiac: s/p OHT 2022 c/b ACR 2/2024, now with graft failure needing VA ECMO x2, now on VA ECMO, Axillary 5.5 impella. Plan for heart kidney transplant. Flowing at 3.5 L, Sweep 5 L, Impella flowing 3.5 L. Malpositioned impella with uptrending LDH which is known. Will discuss with HF surgery   Pacer: None  Pulmonary: Tachypneic this AM, junky cough. XR much worse this AM, combination of volume overload +/- infection. Will increase ultrafiltration.   Gastrointestinal: Pt with increasing indirect hyperbilirubinemia. Liekly secondary to hemolysis given rising LDH. Andrea end ammonia given bili issues and delirium. Poor PO intake. Will start TPN given pts reluctance for DHT  Renal: Oliguric HIRAM requiring CCVHDUF. Goal negative as tolerated.   Endocrine: Hypothyroidism on synthroid. Recheck TFTs 4/23. SSI with good control  Hematology: H/H with anemia to 6.6, got 2 units with increment to 8.0. Consistent thrombocytopenia, will recheck PF4 (though previously negative). Continue heparin purge via impella. Straight dose heparin gtt with Xa 0.3 .  Infectious Disease: Pt recently treated for HAP, has cough but no fever, slightly downtrending leukocytosis. Will diurese today and low threshold to restart if infectious symptomatology. On prophylactic bactrim for PJP, with PCR pending.   Antirejection: Pred 10 mg alone  Musculoskeletal: None    Lines/Tubes/Drains: Axillary 5.5 impella, R radial  grisel arreola catheter    Mechanical Circulatory Support: ECMO:     Most Recent Range Past 24hrs   Flow 3.69 Patient Flow (L/min)  Min: 2.58  Max: 3.87   Speed 4100 Circuit Flow (RPM)  Min: 4100  Max: 4101   Sweep 5 Sweep Gas Flow Rate (L/min)  Min: 4  Max: 5     Impella:      Most Recent Range Past 24hrs   Performance Level 2 P Level  Min: 2   Min taken time: 04/17/24 1000  Max: 2   Max taken time: 04/17/24 1000   Flow (L/min) 1.4 Flow (L/min)  Min: 1.2   Min taken time: 04/17/24 0400  Max: 2   Max taken time: 04/17/24 0000   Motor Current 120/82 Motor Current  Min: 119/80   Min taken time: 04/17/24 0400  Max: 152/82   Max taken time: 04/16/24 1600   Placement Signal Yes  Placement OK could not be evaluated. This SmartLink does not work with rows of the type: Custom List   Purge (mmHg) 435 Purge Pressure (mmHg)  Min: 347   Min taken time: 04/16/24 1800  Max: 656   Max taken time: 04/17/24 0800   Purge rate (mL/hr) 11 Purge Rate (mL/hr)  Min: 10   Min taken time: 04/16/24 1700  Max: 11.2   Max taken time: 04/17/24 0800         Prophylaxis: PPI, hep gtt, SCDs  Med to Discontinue: none    Disposition: CTICU    ABCDEF Checklist  Analgesia: Spontaneous awakening trial to be pursued if clinically appropriate. RASS goal reviewed  Breathing: Spontaneous breathing trial to be pursued if clinically appropriate. Mechanical power of assisted ventilation reviewed  Choice of analgesia/sedation: Analgesic and sedative agents adjusted per clinical context.   Delirium assessed by CAM, will avoid exacerbating factors  Early mobility and exercise: Physical and occupational therapy engaged  Family: Plan of care, overall trajectory of patient shared with family. Questions elicited and answered as appropriate.       Due to the high probability of life threatening clinical decompensation, the patient required critical care time evaluating and managing this patient.  Critical care time included obtaining a history, examining the  patient, ordering and reviewing studies, discussing, developing, and implementing a management plan, evaluating the patient's response to treatment, and discussion with other care team providers. I saw and evaluated the patient myself.  Critical care time was performed exclusive of billable procedures.    Critical care time: 56            Demetrio Murrell MD

## 2024-04-17 NOTE — PROGRESS NOTES
Art Therapy Note    Charla Bowles     Therapy Session  Referral Type: New referral this admission  Visit Type: Follow-up visit  Session Start Time: 1525  Session End Time: 1531  Intervention Delivery: In-person  Family Present for Session: None  Number of staff members present: 1              Treatment/Interventions  Areas of Focus: Anxiety reduction, Relaxation, Coping, Stress reduction  Art Therapy Interventions: Empathic listening/validating emotions, Facilitated relaxation, Assessment, Inspirational intervention  Interruption: Yes  Interruption Outcome: Session ended  Patient Fell Asleep at End of Session: No    Post-assessment  Total Session Time (min): 6 minutes    Narrative  Assessment Detail: Pt lying down, covered with blankets, and awake when ATR returned per pt's request ear;ier.  Plan: ATR returned to complete art intervention proposed earlier to pt, who agreed might help reduce anxiety.  Intervention: ATR taped colorful water and beach scene-per pt's preference, withinin her sight-pt directed ATR where to hang.  Evaluation: Pt liked the images ATR taped up, and per ATR's suggestion, liked idea of reducing images to smaller size to post near or on bed rail.  Follow-up: ATR will follow-up with pt soon to provide images proposed, provided she remains admitted.    Education Documentation  No documentation found.

## 2024-04-17 NOTE — PROGRESS NOTES
Occupational Therapy    Occupational Therapy Treatment    Name: Charla Bowles  MRN: [id number]  : 1990  Date: 24  Room:       Time Calculation  Start Time: 1410  Stop Time: 1423  Time Calculation (min): 13 min    Assessment:  End of Session Communication: Bedside nurse  End of Session Patient Position: Bed, 3 rail up, Alarm off, caregiver present  Plan:  Treatment Interventions: ADL retraining, Functional transfer training, Endurance training, UE strengthening/ROM, Cognitive reorientation, Patient/family training, Equipment evaluation/education, Neuromuscular reeducation, Fine motor coordination activities, Compensatory technique education  OT Frequency: 5 times per week  OT Discharge Recommendations: High intensity level of continued care  Equipment Recommended upon Discharge:  (TBD)  OT Recommended Transfer Status: Assist of 2  OT - OK to Discharge: Yes    Subjective   General:  OT Last Visit  OT Received On: 24  Co-Treatment: PT  Co-Treatment Reason: Pt on ECMO requiring skilled 2 person assist for safe mobility  Prior to Session Communication: Bedside nurse  Patient Position Received: Bed, 3 rail up, Alarm off, not on at start of session  Family/Caregiver Present: Yes  Caregiver Feedback: cousin at bedside at end of session  General Comment: Pt supine in bed upon arrival. VA ECMO flow 4.77, sweep 6, 80% FiO2. R axillary impella (P-2) flow 1.0. Heparin, CVVH through ECMO, .3 precedex. Plan to mobilize this date, however deferred 2/2 tenuous status. Pt with copious bloody secretions that increased with changes in position of bed as well as increased chugging of ECMO line.     Precautions:  Hearing/Visual Limitations: WFL  Medical Precautions: Cardiac precautions, Fall precautions  Precautions Comment: R axillary impella precautions- no pushing/pulling with R UE, No lifting R UE above shoulder height, no R UE humeral EXT past plane of body, R femoral ECMO precautions, MAP 70-90,  protective precautions    Vitals:  Vital Signs  Heart Rate: (!) 111 (post: 114)  Resp: (!) 47 (post: 38)  BP: (!) 103/93 (post: 95/82)  Lines/Tubes/Drains:  ECMO Cannulation Peripheral Right;Femoral artery;Femoral vein (Active)   Number of days: 20       Arterial Line 04/11/24 Right Radial (Active)   Number of days: 5       Ventricular Assist Device Impella 5.5 (Active)   Number of days: 46       ETT  7.5 mm (Active)   Number of days: 0       Hemodialysis Cath 04/06/24 Triple lumen Right Non-tunneled catheter Jugular (Active)   Number of days: 11       Cognition:  Cognition Comments: Pt with very weak voice this date. Difficult to assess mental status    Pain Assessment:  Pain Assessment  Pain Assessment:  (did not report pain this date)     Objective   Activities of Daily Living:        Grooming  Grooming Level of Assistance: Setup  Grooming Where Assessed: Bed level  Grooming Comments: wash face     UE Dressing  UE Dressing Level of Assistance: Maximum assistance  UE Dressing Where Assessed: Bed level  UE Dressing Comments: don/doff gown    Toileting  Toileting Level of Assistance: Dependent  Where Assessed: Bed level       Bed Mobility/Transfers:     Bed Mobility  Bed Mobility: Yes  Bed Mobility 1  Bed Mobility 1: Rolling right, Rolling left  Level of Assistance 1: Dependent  Bed Mobility Comments 1: pt with copious bloody oral secretions at rest that increased with positional changes in bed.     Outcome Measures:  [facility] Daily Activity  Putting on and taking off regular lower body clothing: Total  Bathing (including washing, rinsing, drying): A lot  Putting on and taking off regular upper body clothing: A lot  Toileting, which includes using toilet, bedpan or urinal: Total  Taking care of personal grooming such as brushing teeth: A little  Eating Meals: A little  Daily Activity - Total Score: 12     Education Documentation  Handouts, taught by Marcia Thomason, OT at 4/17/2024  3:53 PM.  Learner:  Patient  Readiness: Acceptance  Method: Explanation, Demonstration  Response: Verbalizes Understanding    Body Mechanics, taught by Marcia Thomason OT at 4/17/2024  3:53 PM.  Learner: Patient  Readiness: Acceptance  Method: Explanation, Demonstration  Response: Verbalizes Understanding    Precautions, taught by Marcia Thomason OT at 4/17/2024  3:53 PM.  Learner: Patient  Readiness: Acceptance  Method: Explanation, Demonstration  Response: Verbalizes Understanding    Home Exercise Program, taught by Marcia Thomason OT at 4/17/2024  3:53 PM.  Learner: Patient  Readiness: Acceptance  Method: Explanation, Demonstration  Response: Verbalizes Understanding    ADL Training, taught by Marcia Thomason OT at 4/17/2024  3:53 PM.  Learner: Patient  Readiness: Acceptance  Method: Explanation, Demonstration  Response: Verbalizes Understanding    Education Comments  No comments found.        Goals:  Encounter Problems       Encounter Problems (Active)       ADLs       Patient will complete daily grooming tasks in standing with LRAD with supervision level of assistance and PRN adaptive equipment. (Progressing)       Start:  03/01/24    Expected End:  04/30/24               ADLs       Patient with complete lower body dressing with stand by assist level of assistance donning and doffing all LE clothes  with PRN adaptive equipment (Not met)       Start:  03/04/24    Expected End:  03/18/24    Resolved:  04/02/24    Updated to: Patient with complete UB bathing with stand by assist level of assistance  with PRN adaptive equipment    Update reason: re eval          Patient will complete toileting including hygiene clothing management/hygiene with stand by assist level of assistance. (Not met)       Start:  03/04/24    Expected End:  03/18/24    Resolved:  04/02/24    Updated to: Patient will complete upper body dressing including with mod I level of assistance.    Update reason: re eval          Patient with complete UB bathing with stand by assist level of assistance  with PRN adaptive equipment (Progressing)       Start:  04/02/24    Expected End:  04/30/24                Patient will complete upper body dressing including with mod I level of assistance. (Progressing)       Start:  04/02/24    Expected End:  04/30/24                   BALANCE       Pt will increase dynamic standing tolerance to >10 min with supervision using LRAD during functional mobility/ADLs without LOB in order to improve activity tolerance and balance for self-care tasks.  (Progressing)       Start:  03/01/24    Expected End:  04/30/24               COGNITION/SAFETY       Patient will participate in cognitive activities to demonstrate WFL score on further cognitive assessments, including Medi-Cog, MoCA and remain A&O x3, CAM (-).  (Progressing)       Start:  03/01/24    Expected End:  04/30/24               EXERCISE/STRENGTHENING       Patient will be educated on BUE HEP for increased ADL/IADL performance. (Progressing)       Start:  03/01/24    Expected End:  04/30/24               MOBILITY       Patient will perform Functional mobility max Household distances/Community Distances with supervision level of assistance and least restrictive device in order to improve safety and functional mobility. (Progressing)       Start:  03/01/24    Expected End:  04/30/24 04/17/24 at 3:54 PM   Marcia Thomason, OT   [phone]

## 2024-04-17 NOTE — PROGRESS NOTES
Per heart team, pt has been made status 7 for heart.    Made patient status 7 for kidney in [facility] and [facility].

## 2024-04-17 NOTE — CONSULTS
"Vancomycin Dosing by Pharmacy- INITIAL    Brienjackelyn Bowles is a 34 y.o. year old female who Pharmacy has been consulted for vancomycin dosing for pneumonia. Based on the patient's indication and renal status this patient will be dosed based on a goal AUC of 500-600.     Renal function is currently stable on CVVH    Visit Vitals  BP (!) 103/93 Comment: post: 95/82   Pulse 102   Temp 36.9 °C (98.4 °F) (Temporal)   Resp 17        Lab Results   Component Value Date    CREATININE 0.64 04/17/2024    CREATININE 0.68 04/17/2024    CREATININE 0.61 04/16/2024    CREATININE 0.56 04/15/2024        Patient weight is No results found for: \"PTWEIGHT\"    No results found for: \"CULTURE\"     I/O last 3 completed shifts:  In: 2596.7 (30.9 mL/kg) [P.O.:400; I.V.:846.7 (10.1 mL/kg); Blood:1250; IV Piggyback:100]  Out: 1080 (12.9 mL/kg) [Other:1080]  Weight: 84 kg   [unfilled]    Lab Results   Component Value Date    PATIENTTEMP 37.0 04/17/2024    PATIENTTEMP 37.0 04/17/2024    PATIENTTEMP 37.0 04/17/2024          Assessment/Plan     Patient will not be given a loading dose.  Will initiate vancomycin maintenance,  750 mg every 12 hours.    Follow-up level will be ordered on 4/18 at pm before 3rd dose. unless clinically indicated sooner.  Will continue to monitor renal function daily while on vancomycin and order serum creatinine at least every 48 hours if not already ordered.  Follow for continued vancomycin needs, clinical response, and signs/symptoms of toxicity.       Solange Alston [facility]       "

## 2024-04-17 NOTE — PROGRESS NOTES
ICU HF attending Note     Delayed entry for date of service 4/16    Patient remains on ECMO with adequate hemodynamic support but significant ongoing hemolysis which we suspect is related to the Impella chest x-ray continues to slowly improve but still has not normalized appetite remains marginal we reviewed her case at coordinating decision was made to reactivate her given that she is clinically similar to same point when she was originally listed although white count has stabilized and is not quite normalized we have discontinued antibiotics for the moment as she completed a full course for hospital-acquired pneumonia and would prefer to not induce more antibiotic resistance continues to receive frequent transfusions but platelet count has stabilized.  Discussed with patient surgery and critical care teams      This critically ill patient continues to be at-risk for clinically significant deterioration / failure due to the above mentioned dysfunctional, unstable organ systems.  I have personally identified and managed all complex critical care issues to prevent aforementioned clinical deterioration.  Critical care time is spent at bedside and/or the immediate area and has included, but is not limited to, the review of diagnostic tests, labs, radiographs, serial assessments of hemodynamics, respiratory status, ventilatory management, and family updates.  Time spent in procedures and teaching are reported separately.     Critical care time: __44__ minutes

## 2024-04-17 NOTE — PROGRESS NOTES
Physical Therapy    Physical Therapy Treatment    Patient Name: Charla Bowles  MRN: [id number]  Today's Date: 4/17/2024  Time Calculation  Start Time: 1408  Stop Time: 1424  Time Calculation (min): 16 min       Assessment/Plan   PT Assessment  PT Assessment Results: Decreased strength, Decreased endurance, Impaired balance, Decreased mobility  Rehab Prognosis: Good  Evaluation/Treatment Tolerance:  (Limited d/t medical complications)  Medical Staff Made Aware: Yes  End of Session Communication: Bedside nurse  End of Session Patient Position: Bed, 3 rail up, Alarm off, not on at start of session  PT Plan  Inpatient/Swing Bed or Outpatient: Inpatient  PT Plan  Treatment/Interventions: Bed mobility, Transfer training, Gait training, Balance training, Neuromuscular re-education, Strengthening, Endurance training, Therapeutic exercise, Therapeutic activity  PT Plan: Skilled PT  PT Frequency: 5 times per week  PT Discharge Recommendations: High intensity level of continued care  PT Recommended Transfer Status: Assist x2  PT - OK to Discharge: Yes      General Visit Information:   PT  Visit  PT Received On: 04/17/24  General  Co-Treatment: OT  Co-Treatment Reason: Pt on ECMO requiring skilled 2 person assist for safe mobility  Prior to Session Communication: Bedside nurse  Patient Position Received: Bed, 3 rail up, Alarm off, not on at start of session  Preferred Learning Style: auditory, verbal  General Comment: Pt awake and alert upon entrance.  Pt with BM at start of session.  Assisted with rolling, however increased red/bloody secretions from pts mouth.  Noted chugging in ECMO line with rolling.  Assisted RN with BM clean up and gown change however deferred additional activity this date. (ECMO: flow-4.65, 80% FiO2, 6 Sweep, Impella: P2, flow-1.2)    Subjective   Precautions:  Precautions  Medical Precautions: Cardiac precautions, Fall precautions  Precautions Comment: R axillary impella precautions- no  pushing/pulling with R UE, No lifting R UE above shoulder height, no R UE humeral EXT past plane of body, R femoral ECMO precautions, MAP 70-90, protective precautions  Vital Signs:  Vital Signs  Heart Rate: (!) 111  Heart Rate Source: Monitor  Resp: (!) 38  BP: (!) 105/92  MAP (mmHg): 97  BP Location: Right arm  BP Method: Arterial line  Patient Position: Lying    Objective   Pain:  Pain Assessment  Pain Assessment: 0-10  Pain Score: 0 - No pain  Cognition:  Cognition  Overall Cognitive Status: Impaired  Activity Tolerance:  Activity Tolerance  Endurance: Tolerates less than 10 min exercise with changes in vital signs  Treatments:  Therapeutic Activity  Therapeutic Activity Performed: Yes  Therapeutic Activity 1: Assisted RN with BM clean up and pad change.  Pt requiring maxAx1-2 to hold in side lying position.  Therapeutic Activity 2: Assisted pt and OT with gown change - pt able to assist with UE movement.    Bed Mobility  Bed Mobility: Yes  Bed Mobility 1  Bed Mobility 1: Rolling right, Rolling left  Level of Assistance 1: Maximum assistance (x1-2)  Bed Mobility Comments 1: HOB flat  Bed Mobility 2  Bed Mobility  2:  (Boost towards HOB)  Level of Assistance 2: Dependent (x2)  Bed Mobility Comments 2: Draw sheet utilized, HOB flat    Ambulation/Gait Training  Ambulation/Gait Training Performed: No  Transfers  Transfer: No    Outcome Measures:  [facility] Basic Mobility  Turning from your back to your side while in a flat bed without using bedrails: A lot  Moving from lying on your back to sitting on the side of a flat bed without using bedrails: A lot  Moving to and from bed to chair (including a wheelchair): A lot  Standing up from a chair using your arms (e.g. wheelchair or bedside chair): A lot  To walk in hospital room: A lot  Climbing 3-5 steps with railing: Total  Basic Mobility - Total Score: 11    FSS-ICU  Ambulation: Unable to attempt due to weakness  Rolling: Maximal assistance (performs 25% - 49% of  task)  Sitting: Supervision or set-up only  Transfer Sit-to-Stand: Maximal assistance (performs 25% - 49% of task)  Transfer Supine-to-Sit: Maximal assistance (performs 25% - 49% of task)  Total Score: 11    Education Documentation  Mobility Training, taught by Michelle Lee, PT at 4/17/2024  3:51 PM.  Learner: Patient  Readiness: Acceptance  Method: Explanation  Response: Demonstrated Understanding    Education Comments  No comments found.    EDUCATION:       Encounter Problems       Encounter Problems (Active)       Balance       Pt will demonstrate ability to complete sitting static/dynamic balance activities with unilateral UE support and no LOB for increase in safety up D/C.  (Progressing)       Start:  04/02/24    Expected End:  04/23/24               Mobility       Pt will demonstrated ability to complete 5 lateral side steps with modAx1, LRAD, proper form and no balance deficits for safe DC. (Progressing)       Start:  04/02/24    Expected End:  04/23/24            Pt will demonstrate ability to complete ther ex activities to improve functional strength for increase in independence upon DC.  (Progressing)       Start:  04/02/24    Expected End:  04/23/24               PT Transfers       Pt will demonstrated ability to complete bed mobility and sit<>stand transfers with mod assistance x2 and use of assistive device to safely DC. (Progressing)       Start:  04/02/24    Expected End:  04/23/24

## 2024-04-17 NOTE — PROGRESS NOTES
Patient encephalopathic and intubated. Per Dr. Witt and team, patient to be delisted. Patient removed from [facility] heart transplant waitlist.

## 2024-04-17 NOTE — PROGRESS NOTES
Discussed patient with Dr. Cornell, Dr. Sherman and Dr. Witt. Due to worsening CXR, patient made UNOS Status 7. Notified kidney coordinator JORY Valderrama.

## 2024-04-17 NOTE — PROCEDURES
Procedure  Bronchoscopy    Indication: Concern for diffuse alveolar hemorrhage vs aspiration pneumonitis  Anesthesia: General  Attending: Dr. Negrito Jara  Physician: Kayla Demarco MD; Claudia Tejeda mD  Complications: None  Specimen: Right middle lobe aliquot    Findings:   Larynx: Unable to assess 2/2 patient's intubation  Tube: Clean  Trachea: No secretions  R mainstem: Blood noted in the right middle lobe  RUL: No secretions  RML: Bloody secretions that was suctioned out.   RLL: Some bloody secretions that was irrigated and suctioned out.   L mainstem: No secretions  MACY: No secretions  Lingula: No secretions    Bronchoalveolar lavage from the right mainstem and right middle lobe bronchus were sent. The right middle lobe was irrigated four times and noted to be clear.

## 2024-04-17 NOTE — SIGNIFICANT EVENT
At ~1500, patient's ECMO was alarming due to extremely low negative pressures on the pre-oxygenator side. Subsequently the patient was also acutely obtunded. Patient responded to aggressive sternal rub, squeezed my hands bilaterally and wiggled her toes to command. Puppils equal and reactive. ECMO without low flows during this episode and alarm resolved with manual decrease in flow from 4L to 3L. ECMO flows increased back to 4L & FIO2 increased to 100%. ABG prior to FIO2 increase reflected appropriate oxygenation and ventilation. Unfortunately, due to her recent oral bleeding and decreased mentation, the decision was made to intubate for airway protection. Bronchoscopy performed by ICU team w/ BAL cultures collected.     Unclear cause of acute mental change. Likely metabolic encephalopathy given lack of focal deficits on exam. Ammonia levels pending. Would repeat infectious workup and start broad spectrum antibiotics per ID recommendations. Low threshold to send for CTH if neuro exam remains questionable. Of note, HHSV6 identified from recent BAL. Following discussions w/ ID, this could result in infectious encephalopathy.     During this time, Dr. Witt, CTICU team, Palliative care team and Advanced Heart Failure team at bedside. Decision was made to delist Charla from UNOS given her multisystem organ failure and change in mental status.     Charla's mother, Fani, arrived at the bedside. I discussed the above events with her, including prior events in the morning. I reiterated Charla's critical illness and encouraged a goals of care discussion tomorrow. Fani was very pleasant, tearful, and understanding of her daughter's state of health. I answered all questions and explained that we are reaching a point where medically there are limited options for survival. She requested that there be a small group to discuss goals of care, including Dr. Murrell, Dr. Sherman, me, her sister and herself. We will  plan on meeting at 1530 tomorrow.

## 2024-04-17 NOTE — LETTER
RE: Transplant Delisting    Dear Charla,    In compliance with the regulations of the [employer] (UNOS), this letter is to inform you of the [facility] Transplant Team’s decision regarding your transplant candidacy. The Transplant Patient Selection Committee discussed your clinical condition 4/17/2024 to review your current medical status. We sincerely regret that at this time, transplantation is no longer a treatment option for your organ failure. This determination was based on your critical illness. Your name is removed from the heart transplant waiting list, effective 4/17/2024.    We appreciate the time and energy you gave to the transplant process. If you have any questions about the committee’s determination, please do not hesitate to contact me at [phone].    Sincerely,    Mirian Phan    Heart Transplant Coordinator  CC:  Encl: UNOS Patient Education Letter

## 2024-04-17 NOTE — PROGRESS NOTES
"        INPATIENT TRANSPLANT NEPHROLOGY PROGRESS NOTE          REASON FOR CONSULT:  Immunosuppressive medication management and nephrology related issues.    SUBJECTION:  CVV[facility] Procedure note    ECMO  On pressors  Tolerated CVVH well    No acute event overnight.    PHYCISCAL EXAMINATION:    Visit Vitals  BP (!) 103/95   Pulse 109   Temp 35.6 °C (96.1 °F) (Skin)   Resp (!) 32   Ht 1.549 m (5' 0.98\")   Wt 84 kg (185 lb 3 oz)   SpO2 99%   BMI 35.01 kg/m²   OB Status Hysterectomy   Smoking Status Never   BSA 1.9 m²        04/15 1900 - 04/17 0659  In: 2596.7 [P.O.:400; I.V.:846.7]  Out: 1080      Weight change:     Constitutional:       General: She is not in acute distress.     Appearance: Normal appearance. She is ill-appearing and toxic-appearing.   HENT:      Head: Normocephalic.      Mouth/Throat:      Mouth: Mucous membranes are moist.   Eyes:      Extraocular Movements: Extraocular movements intact.      Pupils: Pupils are equal, round, and reactive to light.   Neck:      Comments: RIJ dialysis line present - CDI  Cardiovascular:      Rate and Rhythm: Regular rhythm. Tachycardia present.      Pulses: Normal pulses.      Heart sounds: Normal heart sounds.   Pulmonary:      Effort: Pulmonary effort is normal. No respiratory distress.      Breath sounds: Normal breath sounds.   Chest:      Comments: Right axillary impella site CDI   Abdominal:      General: Bowel sounds are normal.      Palpations: Abdomen is soft.      Tenderness: There is no abdominal tenderness.   Musculoskeletal:         General: Normal range of motion.      Cervical back: Normal range of motion.      Right lower leg: Edema present.      Left lower leg: Edema present.   Skin:     General: Skin is warm and dry.      Capillary Refill: Capillary refill takes 2 to 3 seconds.      Coloration: Skin is jaundiced.      Comments: Left groin ECMO cannulation site with drainage    Neurological:      General: No focal deficit present.      Mental Status: She " is alert and oriented to person, place, and time.   Psychiatric:         Behavior: Behavior normal. Behavior is cooperative.     MEDICATION LIST: REVIEWED    albumin human, 12.5 g, Once  albumin human, 12.5 g, Once  calcium carbonate-vitamin D3, 1 tablet, Daily  cyanocobalamin, 500 mcg, Daily  darbepoetin floyd, 100 mcg, q14 days  ferrous sulfate (325 mg ferrous sulfate), 65 mg of iron, Daily with breakfast  folic acid, 1 mg, Daily  insulin lispro, 0-15 Units, TID with meals  levothyroxine, 90 mcg, q24h TRICIA  lidocaine, 1 patch, Daily  melatonin, 10 mg, Daily  multivitamin with minerals, 1 tablet, Daily  mupirocin, 1 Application, BID  nystatin, 5 mL, q6h  oxygen, , Continuous - Inhalation  oxygen, , Continuous - Inhalation  pantoprazole, 40 mg, Daily  polyethylene glycol, 17 g, Daily  predniSONE, 10 mg, Daily  QUEtiapine, 50 mg, BID  [Held by provider] rosuvastatin, 10 mg, Nightly  sennosides-docusate sodium, 2 tablet, BID  sulfamethoxazole-trimethoprim, 80 mg of trimethoprim, Daily  valGANciclovir, 450 mg, q48h      dexmedeTOMIDine, Last Rate: 0.3 mcg/kg/hr (04/17/24 1400)  heparin, Last Rate: 600 Units/hr (04/17/24 1400)  [Held by provider] heparin Impella Purge 25 units/mL in 500 mL D5W, Last Rate: Stopped (04/17/24 1354)  lactated Ringer's, Last Rate: 5 mL/hr (04/17/24 1400)  PrismaSol 4/2.5, Last Rate: 2,400 mL/hr (04/13/24 1700)  sodium bicarbonate infusion Impella Purge 25 mEq/1000 mL D5W, Last Rate: 10 mL/hr (04/17/24 1400)      acetaminophen, 650 mg, q6h PRN  alteplase, 2 mg, PRN  bisacodyl, 10 mg, Daily PRN  calcium gluconate, 1 g, q6h PRN  calcium gluconate, 2 g, q6h PRN  dextrose, 25 g, q15 min PRN   Or  glucagon, 1 mg, q15 min PRN  hydrALAZINE, 5 mg, q4h PRN  hydrOXYzine HCL, 25 mg, q6h PRN  ipratropium-albuteroL, 3 mL, q6h PRN  artificial tears, 2 drop, PRN  magnesium sulfate, 2 g, q6h PRN  magnesium sulfate, 4 g, q6h PRN  microfibrllar collagen, , PRN  mupirocin, , BID PRN  naloxone, 0.2 mg, q5 min  PRN  ondansetron, 4 mg, q4h PRN  oxyCODONE, 5 mg, q6h PRN  sodium chloride, 1 spray, 4x daily PRN        ALLERGY:  Allergies   Allergen Reactions    Dapagliflozin GI bleeding and Bleeding     UTI    Urinary tract infection    Empagliflozin Unknown    Myfortic [Mycophenolate Sodium] GI Upset    Topiramate Nausea Only and Nausea/vomiting    Latex Rash       LABS:  Results for orders placed or performed during the hospital encounter of 02/15/24 (from the past 24 hour(s))   Heparin Assay, UFH   Result Value Ref Range    Heparin Unfractionated 0.4 See Comment Below for Therapeutic Ranges IU/mL   Blood Gas Arterial Full Panel   Result Value Ref Range    POCT pH, Arterial 7.38 7.38 - 7.42 pH    POCT pCO2, Arterial 27 (L) 38 - 42 mm Hg    POCT pO2, Arterial 56 (L) 85 - 95 mm Hg    POCT SO2, Arterial 95 94 - 100 %    POCT Oxy Hemoglobin, Arterial 87.9 (L) 94.0 - 98.0 %    POCT Hematocrit Calculated, Arterial 20.0 (L) 36.0 - 46.0 %    POCT Sodium, Arterial 131 (L) 136 - 145 mmol/L    POCT Potassium, Arterial 5.6 (H) 3.5 - 5.3 mmol/L    POCT Chloride, Arterial 101 98 - 107 mmol/L    POCT Ionized Calcium, Arterial 1.11 1.10 - 1.33 mmol/L    POCT Glucose, Arterial 217 (H) 74 - 99 mg/dL    POCT Lactate, Arterial 3.8 (H) 0.4 - 2.0 mmol/L    POCT Base Excess, Arterial -8.3 (L) -2.0 - 3.0 mmol/L    POCT HCO3 Calculated, Arterial 16.0 (L) 22.0 - 26.0 mmol/L    POCT Hemoglobin, Arterial 6.8 (L) 12.0 - 16.0 g/dL    POCT Anion Gap, Arterial 20 10 - 25 mmo/L    Patient Temperature 37.0 degrees Celsius    FiO2 21 %   CBC   Result Value Ref Range    WBC 23.4 (H) 4.4 - 11.3 x10*3/uL    nRBC 14.8 (H) 0.0 - 0.0 /100 WBCs    RBC 2.10 (L) 4.00 - 5.20 x10*6/uL    Hemoglobin 6.6 (L) 12.0 - 16.0 g/dL    Hematocrit 18.2 (L) 36.0 - 46.0 %    MCV 87 80 - 100 fL    MCH 31.4 26.0 - 34.0 pg    MCHC 36.3 (H) 32.0 - 36.0 g/dL    RDW 19.2 (H) 11.5 - 14.5 %    Platelets 78 (L) 150 - 450 x10*3/uL   POCT GLUCOSE   Result Value Ref Range    POCT Glucose 169  (H) 74 - 99 mg/dL   Prepare RBC: 1 Units, Leukocytes Reduced (CMV reduced risk)   Result Value Ref Range    PRODUCT CODE N5243Y58     Unit Number [id number]     Unit ABO O     Unit RH POS     XM INTEP COMP     Dispense Status XM     Blood Expiration Date May 11, 2024 23:59 EDT     PRODUCT BLOOD TYPE 5100     UNIT VOLUME 281    Blood Gas Arterial Full Panel   Result Value Ref Range    POCT pH, Arterial 7.28 (L) 7.38 - 7.42 pH    POCT pCO2, Arterial 36 (L) 38 - 42 mm Hg    POCT pO2, Arterial 106 (H) 85 - 95 mm Hg    POCT SO2, Arterial 99 94 - 100 %    POCT Oxy Hemoglobin, Arterial 91.3 (L) 94.0 - 98.0 %    POCT Hematocrit Calculated, Arterial 20.0 (L) 36.0 - 46.0 %    POCT Sodium, Arterial 133 (L) 136 - 145 mmol/L    POCT Potassium, Arterial 4.8 3.5 - 5.3 mmol/L    POCT Chloride, Arterial 99 98 - 107 mmol/L    POCT Ionized Calcium, Arterial 1.10 1.10 - 1.33 mmol/L    POCT Glucose, Arterial 204 (H) 74 - 99 mg/dL    POCT Lactate, Arterial 2.8 (H) 0.4 - 2.0 mmol/L    POCT Base Excess, Arterial -9.0 (L) -2.0 - 3.0 mmol/L    POCT HCO3 Calculated, Arterial 16.9 (L) 22.0 - 26.0 mmol/L    POCT Hemoglobin, Arterial 6.7 (L) 12.0 - 16.0 g/dL    POCT Anion Gap, Arterial 22 10 - 25 mmo/L    Patient Temperature 37.0 degrees Celsius    FiO2 21 %   Blood Gas Arterial Full Panel   Result Value Ref Range    POCT pH, Arterial 7.31 (L) 7.38 - 7.42 pH    POCT pCO2, Arterial 44 (H) 38 - 42 mm Hg    POCT pO2, Arterial 112 (H) 85 - 95 mm Hg    POCT SO2, Arterial 99 94 - 100 %    POCT Oxy Hemoglobin, Arterial 92.0 (L) 94.0 - 98.0 %    POCT Hematocrit Calculated, Arterial 23.0 (L) 36.0 - 46.0 %    POCT Sodium, Arterial 134 (L) 136 - 145 mmol/L    POCT Potassium, Arterial 4.5 3.5 - 5.3 mmol/L    POCT Chloride, Arterial 101 98 - 107 mmol/L    POCT Ionized Calcium, Arterial 1.07 (L) 1.10 - 1.33 mmol/L    POCT Glucose, Arterial 127 (H) 74 - 99 mg/dL    POCT Lactate, Arterial 1.6 0.4 - 2.0 mmol/L    POCT Base Excess, Arterial -3.8 (L) -2.0 -  3.0 mmol/L    POCT HCO3 Calculated, Arterial 22.2 22.0 - 26.0 mmol/L    POCT Hemoglobin, Arterial 7.5 (L) 12.0 - 16.0 g/dL    POCT Anion Gap, Arterial 15 10 - 25 mmo/L    Patient Temperature 37.0 degrees Celsius    FiO2 21 %   Type And Screen   Result Value Ref Range    ABO TYPE O     Rh TYPE POS     ANTIBODY SCREEN NEG    Calcium, ionized   Result Value Ref Range    POCT Calcium, Ionized 1.04 (L) 1.1 - 1.33 mmol/L   CBC   Result Value Ref Range    WBC 21.9 (H) 4.4 - 11.3 x10*3/uL    nRBC 14.8 (H) 0.0 - 0.0 /100 WBCs    RBC 2.37 (L) 4.00 - 5.20 x10*6/uL    Hemoglobin 7.0 (L) 12.0 - 16.0 g/dL    Hematocrit 19.7 (L) 36.0 - 46.0 %    MCV 83 80 - 100 fL    MCH 29.5 26.0 - 34.0 pg    MCHC 35.5 32.0 - 36.0 g/dL    RDW 18.0 (H) 11.5 - 14.5 %    Platelets 94 (L) 150 - 450 x10*3/uL   Coagulation Screen   Result Value Ref Range    Protime 15.1 (H) 9.8 - 12.8 seconds    INR 1.3 (H) 0.9 - 1.1    aPTT 53 (H) 27 - 38 seconds   Fibrinogen   Result Value Ref Range    Fibrinogen 260 200 - 400 mg/dL   Cytomegalovirus IgG   Result Value Ref Range    Cytomegalovirus IgG Reactive (A) Nonreactive   HIV 1/2 Antigen/Antibody Screen with Reflex to Confirmation   Result Value Ref Range    HIV 1/2 Antigen/Antibody Screen with Reflex to Confirmation Nonreactive Nonreactive   SARS-COV-2 PCR   Result Value Ref Range    Coronavirus 2019, PCR Not Detected Not Detected   Cytomegalovirus IgG   Result Value Ref Range    Cytomegalovirus IgG Reactive (A) Nonreactive   Cresencio-Barr Virus Antibody Panel   Result Value Ref Range    EBV VCA, IgG  Positive (A) Negative    EBV VCA, IgM  Negative Negative    EBV Early Antigen Antibody, IgG Negative Negative    EBV Nuclear Antigen Antibody, IgG Positive (A) Negative   Hepatitis B Core Antibody, Total   Result Value Ref Range    Hepatitis B Core AB- Total Nonreactive Nonreactive   Hepatitis B Surface Antigen   Result Value Ref Range    Hepatitis B Surface AG Nonreactive Nonreactive   Hepatitis B Surface  Antibody   Result Value Ref Range    Hepatitis B Surface .3 (H) <10.0 mIU/mL   Hepatitis C Antibody   Result Value Ref Range    Hepatitis C AB Nonreactive Nonreactive   Toxoplasma IgG   Result Value Ref Range    Toxoplasma IgG Nonreactive Nonreactive   Varicella Zoster Antibody, IgG   Result Value Ref Range    Varicella Zoster, IgG Positive (A) Negative    Varicella Zoster, IgG Index 2.7 (H) <=0.8 IA   Reticulocytes   Result Value Ref Range    Retic % 2.2 (H) 0.5 - 2.0 %    Retic Absolute 0.047 0.018 - 0.083 x10*6/uL    Reticulocyte Hemoglobin 31 28 - 38 pg    Immature Retic fraction 38.2 (H) <=16.0 %   Haptoglobin   Result Value Ref Range    Haptoglobin <10 (L) 37 - 246 mg/dL   CBC and Auto Differential   Result Value Ref Range    WBC 21.7 (H) 4.4 - 11.3 x10*3/uL    nRBC 16.4 (H) 0.0 - 0.0 /100 WBCs    RBC 2.16 (L) 4.00 - 5.20 x10*6/uL    Hemoglobin 6.6 (L) 12.0 - 16.0 g/dL    Hematocrit 17.7 (L) 36.0 - 46.0 %    MCV 82 80 - 100 fL    MCH 30.6 26.0 - 34.0 pg    MCHC 37.3 (H) 32.0 - 36.0 g/dL    RDW 18.2 (H) 11.5 - 14.5 %    Platelets 51 (L) 150 - 450 x10*3/uL    Immature Granulocytes %, Automated 10.4 (H) 0.0 - 0.9 %    Immature Granulocytes Absolute, Automated 2.26 (H) 0.00 - 0.70 x10*3/uL   Lactate Dehydrogenase   Result Value Ref Range    LDH 3,827 (H) 84 - 246 U/L   Magnesium   Result Value Ref Range    Magnesium 2.82 (H) 1.60 - 2.40 mg/dL   Hepatic Function Panel   Result Value Ref Range    Albumin 4.4 3.4 - 5.0 g/dL    Bilirubin, Total 3.2 (H) 0.0 - 1.2 mg/dL    Bilirubin, Direct 0.5 (H) 0.0 - 0.3 mg/dL    Alkaline Phosphatase 185 (H) 33 - 110 U/L    ALT 39 7 - 45 U/L     (H) 9 - 39 U/L    Total Protein 6.3 (L) 6.4 - 8.2 g/dL   Phosphorus   Result Value Ref Range    Phosphorus 2.3 (L) 2.5 - 4.9 mg/dL   Basic Metabolic Panel   Result Value Ref Range    Glucose 147 (H) 74 - 99 mg/dL    Sodium 134 (L) 136 - 145 mmol/L    Potassium 4.3 3.5 - 5.3 mmol/L    Chloride 98 98 - 107 mmol/L    Bicarbonate 19  (L) 21 - 32 mmol/L    Anion Gap 21 (H) 10 - 20 mmol/L    Urea Nitrogen 23 6 - 23 mg/dL    Creatinine 0.68 0.50 - 1.05 mg/dL    eGFR >90 >60 mL/min/1.73m*2    Calcium 8.4 (L) 8.6 - 10.3 mg/dL   Manual Differential   Result Value Ref Range    Neutrophils %, Manual 65.6 40.0 - 80.0 %    Bands %, Manual 7.8 0.0 - 5.0 %    Lymphocytes %, Manual 3.1 13.0 - 44.0 %    Monocytes %, Manual 11.0 2.0 - 10.0 %    Eosinophils %, Manual 0.0 0.0 - 6.0 %    Basophils %, Manual 0.0 0.0 - 2.0 %    Metamyelocytes %, Manual 9.4 0.0 - 0.0 %    Myelocytes %, Manual 3.1 0.0 - 0.0 %    Seg Neutrophils Absolute, Manual 14.24 (H) 1.20 - 7.00 x10*3/uL    Bands Absolute, Manual 1.69 (H) 0.00 - 0.70 x10*3/uL    Lymphocytes Absolute, Manual 0.67 (L) 1.20 - 4.80 x10*3/uL    Monocytes Absolute, Manual 2.39 (H) 0.10 - 1.00 x10*3/uL    Eosinophils Absolute, Manual 0.00 0.00 - 0.70 x10*3/uL    Basophils Absolute, Manual 0.00 0.00 - 0.10 x10*3/uL    Metamyelocytes Absolute, Manual 2.04 0.00 - 0.00 x10*3/uL    Myelocytes Absolute, Manual 0.67 0.00 - 0.00 x10*3/uL    Total Cells Counted 64     Neutrophils Absolute, Manual 15.93 (H) 1.20 - 7.70 x10*3/uL    RBC Morphology See Below     RBC Fragments Few     [place] Cells Few    Heparin Assay, UFH   Result Value Ref Range    Heparin Unfractionated 0.3 See Comment Below for Therapeutic Ranges IU/mL   Blood Gas Arterial Full Panel   Result Value Ref Range    POCT pH, Arterial 7.33 (L) 7.38 - 7.42 pH    POCT pCO2, Arterial 40 38 - 42 mm Hg    POCT pO2, Arterial 155 (H) 85 - 95 mm Hg    POCT SO2, Arterial 100 94 - 100 %    POCT Oxy Hemoglobin, Arterial 91.5 (L) 94.0 - 98.0 %    POCT Hematocrit Calculated, Arterial 21.0 (L) 36.0 - 46.0 %    POCT Sodium, Arterial 135 (L) 136 - 145 mmol/L    POCT Potassium, Arterial 4.8 3.5 - 5.3 mmol/L    POCT Chloride, Arterial 101 98 - 107 mmol/L    POCT Ionized Calcium, Arterial 1.14 1.10 - 1.33 mmol/L    POCT Glucose, Arterial 153 (H) 74 - 99 mg/dL    POCT Lactate, Arterial 1.2  0.4 - 2.0 mmol/L    POCT Base Excess, Arterial -4.5 (L) -2.0 - 3.0 mmol/L    POCT HCO3 Calculated, Arterial 21.1 (L) 22.0 - 26.0 mmol/L    POCT Hemoglobin, Arterial 7.1 (L) 12.0 - 16.0 g/dL    POCT Anion Gap, Arterial 18 10 - 25 mmo/L    Patient Temperature 37.0 degrees Celsius    FiO2 21 %   ECMO ARTERIAL FULL PANEL   Result Value Ref Range    POCT pH, Arterial ECMO 7.36 Reference range not established pH    POCT pCO2, Arterial ECMO 38 Reference range not established mm Hg    POCT pO2,  Arterial ECMO 182 Reference range not established mm Hg    POCT SO2, Arterial ECMO 99 Reference range not established %    POCT Oxy Hemoglobin, Arterial ECMO 90.2 Reference range not established %    POCT Hematocrit Calculated, Arterial ECMO 21.0 (L) 36.0 - 46.0 %    POCT Sodium, Arterial  ECMO 134 (L) 136 - 145 mmol/L    POCT Potassium, Arterial  ECMO 4.4 3.5 - 5.3 mmol/L    POCT Chloride, Arterial  ECMO 101 98 - 107 mmol/L    POCT Ionized Calcium, Arterial  ECMO 1.09 (L) 1.10 - 1.33 mmol/L    POCT Glucose, Arterial  ECMO 143 (H) 74 - 99 mg/dL    POCT Lactate Arterial  ECMO 1.1 0.4 - 2.0 mmol/L    POCT Base Excess, Arterial ECMO -3.6 Reference range not established mmol/L    POCT HCO3 Calculated, Arterial ECMO 21.5 Reference range not established mmol/L    POCT Hemoglobin, Arterial  ECMO 7.0 (L) 12.0 - 16.0 g/dL    POCT Anion Gap, Arterial  ECMO 16 Reference range not established mmo/L    Patient Temperature 37.0 degrees Celsius    FiO2 80 %   ECMO VENOUS FULL PANEL   Result Value Ref Range    POCT pH, Venous ECMO 7.30 Reference range not established pH    POCT pCO2, Venous ECMO 46 Reference range not established mm Hg    POCT pO2,  Venous  ECMO 30 Reference range not established mm Hg    POCT SO2, Venous ECMO 62 Reference range not established %    POCT Oxy Hemoglobin, Venous ECMO 56.9 Reference range not established %    POCT Hematocrit Calculated, Venous ECMO 19.0 (L) 36.0 - 46.0 %    POCT Sodium, Venous ECMO 135 (L) 136 - 145  mmol/L    POCT Potassium, Venous ECMO 4.6 3.5 - 5.3 mmol/L    POCT Chloride, Venous ECMO 100 98 - 107 mmol/L    POCT Ionized Calcium, Venous ECMO 1.14 1.10 - 1.33 mmol/L    POCT Glucose, Venous ECMO 148 (H) 74 - 99 mg/dL    POCT Lactate Venous ECMO 1.1 0.4 - 2.0 mmol/L    POCT Base Excess, Venous ECMO -3.5 Reference range not established mmol/L    POCT HCO3 Calculated, Venous ECMO 22.6 Reference range not established mmol/L    POCT Hemoglobin, Venous ECMO 6.4 (LL) 12.0 - 16.0 g/dL    POCT Anion Gap, Venous ECMO 17 Reference range not established mmo/L    Patient Temperature 37.0 degrees Celsius    FiO2 80 %   Prepare RBC: 2 Units, Leukocytes Reduced (CMV reduced risk)   Result Value Ref Range    PRODUCT CODE [id number]     Unit Number [id number]     Unit ABO O     Unit RH POS     XM INTEP COMP     Dispense Status IS     Blood Expiration Date May 10, 2024 23:59 EDT     PRODUCT BLOOD TYPE 5100     UNIT VOLUME 350     PRODUCT CODE C4647T14     Unit Number [id number]     Unit ABO O     Unit RH POS     XM INTEP COMP     Dispense Status TR     Blood Expiration Date May 11, 2024 23:59 EDT     PRODUCT BLOOD TYPE 5100     UNIT VOLUME 350    POCT GLUCOSE   Result Value Ref Range    POCT Glucose 182 (H) 74 - 99 mg/dL   CBC   Result Value Ref Range    WBC 21.5 (H) 4.4 - 11.3 x10*3/uL    nRBC 17.9 (H) 0.0 - 0.0 /100 WBCs    RBC 2.62 (L) 4.00 - 5.20 x10*6/uL    Hemoglobin 8.0 (L) 12.0 - 16.0 g/dL    Hematocrit 22.3 (L) 36.0 - 46.0 %    MCV 85 80 - 100 fL    MCH 30.5 26.0 - 34.0 pg    MCHC 35.9 32.0 - 36.0 g/dL    RDW 18.2 (H) 11.5 - 14.5 %    Platelets 94 (L) 150 - 450 x10*3/uL   CBC   Result Value Ref Range    WBC 20.9 (H) 4.4 - 11.3 x10*3/uL    nRBC 21.3 (H) 0.0 - 0.0 /100 WBCs    RBC 2.88 (L) 4.00 - 5.20 x10*6/uL    Hemoglobin 8.7 (L) 12.0 - 16.0 g/dL    Hematocrit 23.6 (L) 36.0 - 46.0 %    MCV 82 80 - 100 fL    MCH 30.2 26.0 - 34.0 pg    MCHC 36.9 (H) 32.0 - 36.0 g/dL    RDW 16.3 (H) 11.5 - 14.5 %    Platelets 74 (L)  150 - 450 x10*3/uL   Calcium, ionized   Result Value Ref Range    POCT Calcium, Ionized 1.10 1.1 - 1.33 mmol/L   Blood Gas Arterial Full Panel   Result Value Ref Range    POCT pH, Arterial 7.25 (LL) 7.38 - 7.42 pH    POCT pCO2, Arterial 56 (H) 38 - 42 mm Hg    POCT pO2, Arterial 85 85 - 95 mm Hg    POCT SO2, Arterial 100 94 - 100 %    POCT Oxy Hemoglobin, Arterial 90.7 (L) 94.0 - 98.0 %    POCT Hematocrit Calculated, Arterial 27.0 (L) 36.0 - 46.0 %    POCT Sodium, Arterial 133 (L) 136 - 145 mmol/L    POCT Potassium, Arterial 4.3 3.5 - 5.3 mmol/L    POCT Chloride, Arterial 102 98 - 107 mmol/L    POCT Ionized Calcium, Arterial 1.12 1.10 - 1.33 mmol/L    POCT Glucose, Arterial 95 74 - 99 mg/dL    POCT Lactate, Arterial 1.0 0.4 - 2.0 mmol/L    POCT Base Excess, Arterial -2.9 (L) -2.0 - 3.0 mmol/L    POCT HCO3 Calculated, Arterial 24.6 22.0 - 26.0 mmol/L    POCT Hemoglobin, Arterial 9.0 (L) 12.0 - 16.0 g/dL    POCT Anion Gap, Arterial 11 10 - 25 mmo/L    Patient Temperature 37.0 degrees Celsius    FiO2 21 %   POCT GLUCOSE   Result Value Ref Range    POCT Glucose 112 (H) 74 - 99 mg/dL   Blood Gas Arterial Full Panel Unsolicited   Result Value Ref Range    POCT pH, Arterial 7.34 (L) 7.38 - 7.42 pH    POCT pCO2, Arterial 44 (H) 38 - 42 mm Hg    POCT pO2, Arterial 134 (H) 85 - 95 mm Hg    POCT SO2, Arterial 100 94 - 100 %    POCT Oxy Hemoglobin, Arterial 91.5 (L) 94.0 - 98.0 %    POCT Hematocrit Calculated, Arterial 26.0 (L) 36.0 - 46.0 %    POCT Sodium, Arterial 132 (L) 136 - 145 mmol/L    POCT Potassium, Arterial 4.7 3.5 - 5.3 mmol/L    POCT Chloride, Arterial 104 98 - 107 mmol/L    POCT Ionized Calcium, Arterial 1.10 1.10 - 1.33 mmol/L    POCT Glucose, Arterial 100 (H) 74 - 99 mg/dL    POCT Lactate, Arterial 1.0 0.4 - 2.0 mmol/L    POCT Base Excess, Arterial -2.0 -2.0 - 3.0 mmol/L    POCT HCO3 Calculated, Arterial 23.7 22.0 - 26.0 mmol/L    POCT Hemoglobin, Arterial 8.7 (L) 12.0 - 16.0 g/dL    POCT Anion Gap, Arterial 9  (L) 10 - 25 mmo/L    Patient Temperature 37.0 degrees Celsius   Blood Gas Arterial Full Panel   Result Value Ref Range    POCT pH, Arterial 7.34 (L) 7.38 - 7.42 pH    POCT pCO2, Arterial 44 (H) 38 - 42 mm Hg    POCT pO2, Arterial 121 (H) 85 - 95 mm Hg    POCT SO2, Arterial 100 94 - 100 %    POCT Oxy Hemoglobin, Arterial 91.1 (L) 94.0 - 98.0 %    POCT Hematocrit Calculated, Arterial 25.0 (L) 36.0 - 46.0 %    POCT Sodium, Arterial 133 (L) 136 - 145 mmol/L    POCT Potassium, Arterial 4.8 3.5 - 5.3 mmol/L    POCT Chloride, Arterial 103 98 - 107 mmol/L    POCT Ionized Calcium, Arterial 1.10 1.10 - 1.33 mmol/L    POCT Glucose, Arterial 110 (H) 74 - 99 mg/dL    POCT Lactate, Arterial 1.0 0.4 - 2.0 mmol/L    POCT Base Excess, Arterial -2.0 -2.0 - 3.0 mmol/L    POCT HCO3 Calculated, Arterial 23.7 22.0 - 26.0 mmol/L    POCT Hemoglobin, Arterial 8.3 (L) 12.0 - 16.0 g/dL    POCT Anion Gap, Arterial 11 10 - 25 mmo/L    Patient Temperature 37.0 degrees Celsius    FiO2 21 %     *Note: Due to a large number of results and/or encounters for the requested time period, some results have not been displayed. A complete set of results can be found in Results Review.        ASSESSMENT AND PLAN:    Ms. Yimi Bowles is a 34 y.o. female who underwent a kidney transplant surgery  on 3/31/2022 (Heart).    Principal Problem:    Cardiogenic shock (CMS/HCC)  Active Problems:    Heart transplant recipient (CMS/[facility])    ESRD (end stage renal disease) on dialysis (CMS/[facility])    HIRAM (acute kidney injury) (CMS/[facility])    Acute passive congestion of liver    Hyponatremia    Iron deficiency anemia    Abdominal pain    Cardiac transplant rejection (CMS/HCC)    Acute combined systolic (congestive) and diastolic (congestive) heart failure (CMS/HCC)      CRRT Management Note:    - Patient was seen and examined while on CVVH   - Lab was reviewed  - CVVH order was reviewed  -Discussed with primary team  -Discussed with RN   - Will continue CVVH for now  -Daily  evaluation for indications for CVVH and will convert it to regular IHD once the patient is more hemodynamically stable.    Heart and Kidney transplant Candidacy :   Will meet the OPTN eligibility criteria for kidney transplant on 3/29/24 for sustained HIRAM over 6 weeks. Pre tx work up - per heart tx team.   Note:  GFR 2/17/24=23  CVVH started 2/19 and has remained on CVVH still  She is approved for CANDIDATE PENDING HEART TRANSPLANT APPROVAL at kidney transplant selection committee 3/28/24    [Sustained acute kidney injury : At least one of the following, or a combination of both of the following, for the last 6 weeks:    That the candidate has been on dialysis at least once every 7 days.    That the candidate has a measured or calculated CrCl or GFR less than or equal to 25 mL/min at least once every 7 days]      * Case was discussed with primary team.  For questions, please contact transplant nephrology page [phone]    Russ Bergeron    Transplant Nephrologist

## 2024-04-17 NOTE — SIGNIFICANT EVENT
4/17/24: 5046    ENT was paged to come valuate the patient bedside for concerns secondary to epistaxis.  Flexible nasolaryngoscopy was performed.  See procedure note below.    PROCEDURE NOTE:  Nasopharyngolaryngoscopy  For better visualization because o, a flexible fiberoptic nasopharyngolaryngoscopy was performed. Patient was correctly identified and verbal consent obtained.  After topical anesthesia with atomized 4% Lidocaine with Afrin, the flexible scope   was introduced into the bliateral  naris.    The following areas were visualized:  Nasal septum, turbinates, nasopharynx, oropharynx, hypopharynx, soft palate, base of tongue, epiglottis, and larynx.    These structures were found to be normal with the following notable findings:     -Significant crusting was noted in bilateral nasal cavities.  There is no evidence of any active source of bleeding.  - Blood-tinged saliva was noted at the base of tongue, vallecula and piriform sinus.  No evidence of bleeding source appreciated.  - The scope was lowered to the level of the glottis, no evidence of the bleeding was originating from within the trachea.    This procedure was performed with the ICU attending and ICU resident at bedside.  After the procedure, a thorough examination of the oral cavity was completed.  It was determined that the bleeding source was most likely coming from an oral cavity laceration.  The primary team elected to proceed by to place TXA soaked gauze in the oral cavity.    Aaron Mendoza, DO PGY-2  Otolaryngology - Head & Neck Surgery  [facility]  ENT Consult pager: [phone]

## 2024-04-18 NOTE — ACP (ADVANCE CARE PLANNING)
Confirming Previous Code Status:   Advance Care Planning Note     Discussion Date: 04/18/24   Discussion Participants: Dr. Murrell, Dr. Cornell, Keith Jain, mother, sister, and aunt    The CTICU team, Advanced Heart Failure team and patient's family met to discuss Advance Care Planning today and the following is a brief summary of our discussion.     Patient has capacity to make their own medical decisions: No  Health Care Agent/Surrogate Decision Maker documented in chart: Yes    Documents on file and valid:  Advance Directive/Living Will: Yes  Health Care Power of : Yes    Communication of Medical Status/Prognosis & Treatment Goals/Options:   Dr. Murrell began the conversations by asking Ms. Yimi Bowles' family to describe who she was prior to her illness. Her family provided several stories and described her as an outgoing, friendly and loving mother. He then gave a summary of Ms. Yimi Bowles medical course over the last 2 months, this included cardiac allograft failure of unknown etiology, multiorgan failure and non-transplantable candidacy. Unfortunately despite maximum medical therapy, she continues to decline and is actively dying. Ms. Yimi Bowles' family was tearful but understanding. They requested that family have time to visit over the next several days. She was made DNAR-Comfort Care Arrest at this time. Her family is aware that there will be no escalation of care.       Keith Jain, JIMMY-CNP  4/18/2024 4:02 PM

## 2024-04-18 NOTE — PROGRESS NOTES
Charla Bowles is a 34 y.o. female on day 63 of admission presenting with Cardiogenic shock (Multi).    Patient requires ECMO support. Drainage cannula site, cannula, pump, oxygenator, return cannula and return cannula site clinically inspected. RPM and sweep reviewed and adjusted appropriate to clinical context. Anticoagulation status and intensity discussed. Plan for weaning, next clinical steps discussed with team and family. 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  ECMO Cannula Configuration: Fem Fem VA ECMO  ECMO circuit run from drainage cannula to pump to oxygenator to return cannula: YEs  Presence of cannula bleeding: No  Presence of oxygenator clot: No  Pre/post PaO2 adequate: Yes  LV Unloading/Pulse Pressure: Axillary 5.5 Impella  Distal Perfusion: Yes, monophasic pulse in that leg  Anticoagulation Plan/Monitoring: Off heparin given bleeding  Mobility Plan/Candidacy: No  Path to decannulation/anticipated decannulation date:Uncertain  ECMO:     Most Recent Range Past 24hrs   Flow 3.77 Patient Flow (L/min)  Min: 2.56  Max: 4.8   Speed 3400 Circuit Flow (RPM)  Min: 3170  Max: 4106   Sweep 5 Sweep Gas Flow Rate (L/min)  Min: 5  Max: 6       Adult Clinimix Parenteral Nutrition, 30 mL/hr, Last Rate: 30 mL/hr (04/18/24 1000)  dexmedeTOMIDine, 0.1-1.5 mcg/kg/hr, Last Rate: Stopped (04/17/24 1615)  lactated Ringer's, 5 mL/hr, Last Rate: 5 mL/hr (04/18/24 1000)  PrismaSol 4/2.5, 2,400 mL/hr, Last Rate: 2,400 mL/hr (04/13/24 1700)  propofol, 5-50 mcg/kg/min (Dosing Weight), Last Rate: 15 mcg/kg/min (04/18/24 1005)  sodium bicarbonate infusion Impella Purge 25 mEq/1000 mL D5W, 10 mL/hr, Last Rate: 10 mL/hr (04/18/24 1000)          Vent Mode: Airway pressure release ventilation  FiO2 (%):  [50 %-100 %] 60 %  MAP (cm H2O):  [27-30] 27              Demetrio Murrell MD

## 2024-04-18 NOTE — PROGRESS NOTES
Charla Bowles is a 34 y.o. female on day 63 of admission presenting with Cardiogenic shock (Multi).    I have seen the patient either independently or with an associated resident physician or advanced practice provider    Overnight Events: Remains intubated, on VA ECPELLA. No significant bleeding. Family meeting with entire team today at 1530    Neuro: Propofol, prn hydromorphone. Can dc seroquel. Pt RASS 0, -2.   Cardiac: BiV failure from acute rejection of heart transplant. On VA-ECMO, with Impella 5.5. 3.5 L ECMo Flow, Impella P4 with 1.5 L flow. Pt with significant hemolysis, and impella a bit high. Given bleeding yesterday, off of heparin infusion. Pt is no longer a transplant candidate and will advocate for comfort care    Pacer: None  Pulmonary: Reintubated yesterday for aspiration, poor mental status. Pt XR still with bilateral infiltrates, though much improved. ABG has also improved.   Vent Mode: Airway pressure release ventilation  FiO2 (%):  [50 %-100 %] 80 %  MAP (cm H2O):  [27-30] 27  Gastrointestinal: Aspiration of oral content yesterday, placed OGT today. Belly is soft. PPI. Bowel regimen  Renal: on CVVHDUF, though pigtail on ECMO clotted, will need to remove fluid via trialysis to assist with negative 1 L fluid balance  Endocrine: Synthroid for hypothyroidism  Hematology: H/H stable, Plts 43. No systemic heparin, nor any signs of bleeding.   Immunosuppression: Pred 10. Off all other antirejection medications  Infectious Disease: Vanc/Susan/Vori started given decompensation yesterday. No growth on recent culture data  Musculoskeletal: No issues    Lines/Tubes/Drains: Fem Fem VA ECMO, axillary impella, trialysis, maxwell    Mechanical Circulatory Support: ECMO:     Most Recent Range Past 24hrs   Flow 3.86 Patient Flow (L/min)  Min: 2.56  Max: 4.8   Speed 3400 Circuit Flow (RPM)  Min: 3170  Max: 4106   Sweep 5 Sweep Gas Flow Rate (L/min)  Min: 5  Max: 6     Impella:      Most Recent Range Past  24hrs   Performance Level 4 P Level  Min: 2   Min taken time: 04/17/24 1630  Max: 4   Max taken time: 04/18/24 0900   Flow (L/min) 1.4 Flow (L/min)  Min: 0.5   Min taken time: 04/17/24 1630  Max: 1.6   Max taken time: 04/18/24 0600   Motor Current 216/147 Motor Current  Min: 114/81   Min taken time: 04/17/24 1500  Max: 230/151   Max taken time: 04/17/24 1900   Placement Signal Yes  Placement OK could not be evaluated. This SmartLink does not work with rows of the type: Custom List   Purge (mmHg) 583 Purge Pressure (mmHg)  Min: 389   Min taken time: 04/17/24 1100  Max: 638   Max taken time: 04/17/24 1400   Purge rate (mL/hr) 10.8 Purge Rate (mL/hr)  Min: 10.1   Min taken time: 04/17/24 2000  Max: 11.3   Max taken time: 04/18/24 0400         Prophylaxis: SCDs, PPI  Med to Discontinue: Seroquel    Disposition: CTICU    ABCDEF Checklist  Analgesia: Spontaneous awakening trial to be pursued if clinically appropriate. RASS goal reviewed  Breathing: Spontaneous breathing trial to be pursued if clinically appropriate. Mechanical power of assisted ventilation reviewed  Choice of analgesia/sedation: Analgesic and sedative agents adjusted per clinical context.   Delirium assessed by CAM, will avoid exacerbating factors  Early mobility and exercise: Physical and occupational therapy engaged  Family: Plan of care, overall trajectory of patient shared with family. Questions elicited and answered as appropriate.       Due to the high probability of life threatening clinical decompensation, the patient required critical care time evaluating and managing this patient.  Critical care time included obtaining a history, examining the patient, ordering and reviewing studies, discussing, developing, and implementing a management plan, evaluating the patient's response to treatment, and discussion with other care team providers. I saw and evaluated the patient myself.  Critical care time was performed exclusive of billable  procedures.    Critical care time: 61              Demetrio Murrell MD

## 2024-04-18 NOTE — PROGRESS NOTES
Occupational Therapy    Communication Note    Missed Visit: Yes  Missed Visit Reason:  (0859: pt reintubated yesterday, not medically appropriate for OT tx at this time.)      04/18/24 at 9:01 AM   Marcia Thomason, OT   Rehab Office: [phone]         [FreeTextEntry1] : .Pt is an 83 yo woman with PHMx of DM and CAD has right lower abdominal wound,\par here for skin replacement\par  speaks only Filipino/  mitali perry interpretor [id number]\par  wound she has on her right side of the abdomen.old scar with incisional hernia\par  Pt had this wound for months pt was admitted to the hospital on July 31, 2018 until  Aug 6 2018. Pt had been getting treated by an ID doctor\par  She has not had any fever.  She is frustrated that the wound is not healing. She is receiving wound care at home every other day. \par   duoderm to periwound/ wears a hernia abd binder to reduce it\par  hernia (2 areas ) is large spreading skin open, is partly reducible\par \par Of Note, she:\par - Had colorectal cancer s/p bowel resection\par - Has ventral abdominal hernias - S/P repair in 2017\par -history for SBO.\par \par She was admitted to [facility] with subjective fever and pain over a right sided abdominal wall ulcer.  The patient states that the right sided abdominal wall ulcer has been present for over a year. \par At that time, her right lateral abdominal wall was red.  She had drainage from the ulceration to the abdominal wall- from the lower pole of the wound. \par She had a CT that showed some inflammatory change in  the abdominal wall but no evidence of abscess or fistula.\par s/p mssa and enterbacter, treated with augmentin/ levaquin and fluconazole for vaginal candidiasis\par    She was treated with appropriate abx intravenously for 5 days and sent home on augmentin.  During her hospitalization, the redness to the abdominal wall improved but the ulcerated lesion continued to drain. \par She was discharged on Augmentin with a plan for wound care. \par \par  . \par \par \par

## 2024-04-18 NOTE — PROGRESS NOTES
"        INPATIENT TRANSPLANT NEPHROLOGY PROGRESS NOTE          REASON FOR CONSULT:  Immunosuppressive medication management and nephrology related issues.    SUBJECTION:  CVVH Procedure note    ECMO  On pressors  Tolerated CVVH well    Overnight Events: Remains intubated, on VA ECPELLA. No significant bleeding.     PHYCISCAL EXAMINATION:    Visit Vitals  BP (!) 103/93 Comment: post: 95/82   Pulse 103   Temp 36.6 °C (97.9 °F) (Axillary)   Resp 12   Ht 1.549 m (5' 0.98\")   Wt 78.7 kg (173 lb 8 oz)   SpO2 98%   BMI 32.80 kg/m²   OB Status Hysterectomy   Smoking Status Never   BSA 1.84 m²        04/16 1900 - 04/18 0659  In: 2759.6 [I.V.:1148.5]  Out: 991      Weight change:   Neuro: Propofol, prn hydromorphone. Can dc seroquel. Pt RASS 0, -2.   Cardiac: BiV failure from acute rejection of heart transplant. On VA-ECMO, with Impella 5.5. 3.5 L ECMo Flow, Impella P4 with 1.5 L flow. Pt with significant hemolysis, and impella a bit high. Given bleeding yesterday, off of heparin infusion. Pt is no longer a transplant candidate and will advocate for comfort care               Pacer: None  Pulmonary: Reintubated yesterday for aspiration, poor mental status. Pt XR still with bilateral infiltrates, though much improved. ABG has also improved.   Vent Mode: Airway pressure release ventilation  FiO2 (%):  [50 %-100 %] 80 %  MAP (cm H2O):  [27-30] 27  Gastrointestinal: Aspiration of oral content yesterday, placed OGT today. Belly is soft. PPI. Bowel regimen  Renal: on CVVH, though pigtail on ECMO clotted, will need to remove fluid via trialysis to assist with negative 1 L fluid balance  Endocrine: Synthroid for hypothyroidism  Hematology: H/H stable, Plts 43. No systemic heparin, nor any signs of bleeding.   Immunosuppression: Pred 10. Off all other antirejection medications  Infectious Disease: Vanc/Susan/Vori started given decompensation yesterday. No growth on recent culture data  Musculoskeletal: No issues     Lines/Tubes/Drains: " [facility] VA ECMO, axillary impella, trialysis, maxwell    MEDICATION LIST: REVIEWED    calcium carbonate-vitamin D3, 1 tablet, Daily  cyanocobalamin, 500 mcg, Daily  darbepoetin floyd, 100 mcg, q14 days  ferrous sulfate (325 mg ferrous sulfate), 65 mg of iron, Daily with breakfast  folic acid, 1 mg, Daily  insulin lispro, 0-15 Units, TID with meals  levothyroxine, 90 mcg, q24h TRICIA  lidocaine, 1 patch, Daily  melatonin, 10 mg, Daily  meropenem, 1 g, q12h  multivitamin with minerals, 1 tablet, Daily  mupirocin, 1 Application, BID  nystatin, 5 mL, q6h  pantoprazole, 40 mg, Daily  polyethylene glycol, 17 g, Daily  potassium, sodium phosphates, 1 packet, 4x daily  predniSONE, 10 mg, Daily  sennosides-docusate sodium, 2 tablet, BID  sulfamethoxazole-trimethoprim, 80 mg of trimethoprim, Daily  valGANciclovir, 450 mg, q48h  vancomycin, 750 mg, q12h  voriconazole, 4 mg/kg, q12h      Adult Clinimix Parenteral Nutrition, Last Rate: 30 mL/hr (04/18/24 1200)  dexmedeTOMIDine, Last Rate: Stopped (04/17/24 1615)  lactated Ringer's, Last Rate: 5 mL/hr (04/18/24 1200)  PrismaSol 4/2.5, Last Rate: 2,400 mL/hr (04/13/24 1700)  propofol, Last Rate: 30 mcg/kg/min (04/18/24 1200)  sodium bicarbonate infusion Impella Purge 25 mEq/1000 mL D5W, Last Rate: 10 mL/hr (04/18/24 1200)      acetaminophen, 650 mg, q6h PRN  alteplase, 2 mg, PRN  bisacodyl, 10 mg, Daily PRN  calcium gluconate, 1 g, q6h PRN  calcium gluconate, 2 g, q6h PRN  dextrose, 25 g, q15 min PRN   Or  glucagon, 1 mg, q15 min PRN  hydrALAZINE, 5 mg, q4h PRN  HYDROmorphone, 0.2 mg, q3h PRN  ipratropium-albuteroL, 3 mL, q6h PRN  artificial tears, 2 drop, PRN  magnesium sulfate, 2 g, q6h PRN  magnesium sulfate, 4 g, q6h PRN  microfibrllar collagen, , PRN  mupirocin, , BID PRN  naloxone, 0.2 mg, q5 min PRN  ondansetron, 4 mg, q4h PRN  oxyCODONE, 5 mg, q6h PRN  oxygen, , Continuous PRN - O2/gases  sodium chloride, 1 spray, 4x daily PRN  vancomycin, , Daily PRN        ALLERGY:  Allergies    Allergen Reactions    Dapagliflozin GI bleeding and Bleeding     UTI    Urinary tract infection    Empagliflozin Unknown    Myfortic [Mycophenolate Sodium] GI Upset    Topiramate Nausea Only and Nausea/vomiting    Latex Rash       LABS:  Results for orders placed or performed during the hospital encounter of 02/15/24 (from the past 24 hour(s))   Blood Gas Arterial Full Panel Unsolicited   Result Value Ref Range    POCT pH, Arterial 7.34 (L) 7.38 - 7.42 pH    POCT pCO2, Arterial 44 (H) 38 - 42 mm Hg    POCT pO2, Arterial 134 (H) 85 - 95 mm Hg    POCT SO2, Arterial 100 94 - 100 %    POCT Oxy Hemoglobin, Arterial 91.5 (L) 94.0 - 98.0 %    POCT Hematocrit Calculated, Arterial 26.0 (L) 36.0 - 46.0 %    POCT Sodium, Arterial 132 (L) 136 - 145 mmol/L    POCT Potassium, Arterial 4.7 3.5 - 5.3 mmol/L    POCT Chloride, Arterial 104 98 - 107 mmol/L    POCT Ionized Calcium, Arterial 1.10 1.10 - 1.33 mmol/L    POCT Glucose, Arterial 100 (H) 74 - 99 mg/dL    POCT Lactate, Arterial 1.0 0.4 - 2.0 mmol/L    POCT Base Excess, Arterial -2.0 -2.0 - 3.0 mmol/L    POCT HCO3 Calculated, Arterial 23.7 22.0 - 26.0 mmol/L    POCT Hemoglobin, Arterial 8.7 (L) 12.0 - 16.0 g/dL    POCT Anion Gap, Arterial 9 (L) 10 - 25 mmo/L    Patient Temperature 37.0 degrees Celsius   Blood Gas Arterial Full Panel   Result Value Ref Range    POCT pH, Arterial 7.34 (L) 7.38 - 7.42 pH    POCT pCO2, Arterial 44 (H) 38 - 42 mm Hg    POCT pO2, Arterial 121 (H) 85 - 95 mm Hg    POCT SO2, Arterial 100 94 - 100 %    POCT Oxy Hemoglobin, Arterial 91.1 (L) 94.0 - 98.0 %    POCT Hematocrit Calculated, Arterial 25.0 (L) 36.0 - 46.0 %    POCT Sodium, Arterial 133 (L) 136 - 145 mmol/L    POCT Potassium, Arterial 4.8 3.5 - 5.3 mmol/L    POCT Chloride, Arterial 103 98 - 107 mmol/L    POCT Ionized Calcium, Arterial 1.10 1.10 - 1.33 mmol/L    POCT Glucose, Arterial 110 (H) 74 - 99 mg/dL    POCT Lactate, Arterial 1.0 0.4 - 2.0 mmol/L    POCT Base Excess, Arterial -2.0 -2.0 -  3.0 mmol/L    POCT HCO3 Calculated, Arterial 23.7 22.0 - 26.0 mmol/L    POCT Hemoglobin, Arterial 8.3 (L) 12.0 - 16.0 g/dL    POCT Anion Gap, Arterial 11 10 - 25 mmo/L    Patient Temperature 37.0 degrees Celsius    FiO2 21 %   CBC   Result Value Ref Range    WBC 19.7 (H) 4.4 - 11.3 x10*3/uL    nRBC 22.6 (H) 0.0 - 0.0 /100 WBCs    RBC 2.65 (L) 4.00 - 5.20 x10*6/uL    Hemoglobin 8.0 (L) 12.0 - 16.0 g/dL    Hematocrit 21.5 (L) 36.0 - 46.0 %    MCV 81 80 - 100 fL    MCH 30.2 26.0 - 34.0 pg    MCHC 37.2 (H) 32.0 - 36.0 g/dL    RDW 16.6 (H) 11.5 - 14.5 %    Platelets 73 (L) 150 - 450 x10*3/uL   Blood Gas Arterial Full Panel Unsolicited   Result Value Ref Range    POCT pH, Arterial 7.34 (L) 7.38 - 7.42 pH    POCT pCO2, Arterial 45 (H) 38 - 42 mm Hg    POCT pO2, Arterial 107 (H) 85 - 95 mm Hg    POCT SO2, Arterial 100 94 - 100 %    POCT Oxy Hemoglobin, Arterial 90.8 (L) 94.0 - 98.0 %    POCT Hematocrit Calculated, Arterial 25.0 (L) 36.0 - 46.0 %    POCT Sodium, Arterial 133 (L) 136 - 145 mmol/L    POCT Potassium, Arterial 4.7 3.5 - 5.3 mmol/L    POCT Chloride, Arterial 103 98 - 107 mmol/L    POCT Ionized Calcium, Arterial 1.11 1.10 - 1.33 mmol/L    POCT Glucose, Arterial 133 (H) 74 - 99 mg/dL    POCT Lactate, Arterial 1.1 0.4 - 2.0 mmol/L    POCT Base Excess, Arterial -1.5 -2.0 - 3.0 mmol/L    POCT HCO3 Calculated, Arterial 24.3 22.0 - 26.0 mmol/L    POCT Hemoglobin, Arterial 8.3 (L) 12.0 - 16.0 g/dL    POCT Anion Gap, Arterial 10 10 - 25 mmo/L    Patient Temperature 37.0 degrees Celsius   Blood Gas Arterial Full Panel Unsolicited   Result Value Ref Range    POCT pH, Arterial 7.34 (L) 7.38 - 7.42 pH    POCT pCO2, Arterial 42 38 - 42 mm Hg    POCT pO2, Arterial 121 (H) 85 - 95 mm Hg    POCT SO2, Arterial 100 94 - 100 %    POCT Oxy Hemoglobin, Arterial 90.8 (L) 94.0 - 98.0 %    POCT Hematocrit Calculated, Arterial 24.0 (L) 36.0 - 46.0 %    POCT Sodium, Arterial 134 (L) 136 - 145 mmol/L    POCT Potassium, Arterial 4.6 3.5 -  5.3 mmol/L    POCT Chloride, Arterial 103 98 - 107 mmol/L    POCT Ionized Calcium, Arterial 1.09 (L) 1.10 - 1.33 mmol/L    POCT Glucose, Arterial 126 (H) 74 - 99 mg/dL    POCT Lactate, Arterial 1.0 0.4 - 2.0 mmol/L    POCT Base Excess, Arterial -2.9 (L) -2.0 - 3.0 mmol/L    POCT HCO3 Calculated, Arterial 22.7 22.0 - 26.0 mmol/L    POCT Hemoglobin, Arterial 8.1 (L) 12.0 - 16.0 g/dL    POCT Anion Gap, Arterial 13 10 - 25 mmo/L    Patient Temperature 37.0 degrees Celsius   SST TOP   Result Value Ref Range    Extra Tube Hold for add-ons.    Blood Gas Arterial Full Panel Unsolicited   Result Value Ref Range    POCT pH, Arterial 7.42 7.38 - 7.42 pH    POCT pCO2, Arterial 35 (L) 38 - 42 mm Hg    POCT pO2, Arterial 408 (H) 85 - 95 mm Hg    POCT SO2, Arterial 100 94 - 100 %    POCT Oxy Hemoglobin, Arterial 90.5 (L) 94.0 - 98.0 %    POCT Hematocrit Calculated, Arterial 23.0 (L) 36.0 - 46.0 %    POCT Sodium, Arterial 133 (L) 136 - 145 mmol/L    POCT Potassium, Arterial 4.2 3.5 - 5.3 mmol/L    POCT Chloride, Arterial      POCT Ionized Calcium, Arterial 1.08 (L) 1.10 - 1.33 mmol/L    POCT Glucose, Arterial 135 (H) 74 - 99 mg/dL    POCT Lactate, Arterial 0.9 0.4 - 2.0 mmol/L    POCT Base Excess, Arterial -1.5 -2.0 - 3.0 mmol/L    POCT HCO3 Calculated, Arterial 22.7 22.0 - 26.0 mmol/L    POCT Hemoglobin, Arterial 7.8 (L) 12.0 - 16.0 g/dL    POCT Anion Gap, Arterial      Patient Temperature 37.0 degrees Celsius   Respiratory Culture/Smear    Specimen: BAL; Fluid   Result Value Ref Range    Gram Stain (1+) Rare Polymorphonuclear leukocytes     Gram Stain No organisms seen    Blood Gas Arterial Full Panel Unsolicited   Result Value Ref Range    POCT pH, Arterial 7.35 (L) 7.38 - 7.42 pH    POCT pCO2, Arterial 41 38 - 42 mm Hg    POCT pO2, Arterial 109 (H) 85 - 95 mm Hg    POCT SO2, Arterial 100 94 - 100 %    POCT Oxy Hemoglobin, Arterial 89.7 (L) 94.0 - 98.0 %    POCT Hematocrit Calculated, Arterial 24.0 (L) 36.0 - 46.0 %    POCT  Sodium, Arterial 134 (L) 136 - 145 mmol/L    POCT Potassium, Arterial 4.5 3.5 - 5.3 mmol/L    POCT Chloride, Arterial 101 98 - 107 mmol/L    POCT Ionized Calcium, Arterial 1.12 1.10 - 1.33 mmol/L    POCT Glucose, Arterial 162 (H) 74 - 99 mg/dL    POCT Lactate, Arterial 1.1 0.4 - 2.0 mmol/L    POCT Base Excess, Arterial -2.8 (L) -2.0 - 3.0 mmol/L    POCT HCO3 Calculated, Arterial 22.6 22.0 - 26.0 mmol/L    POCT Hemoglobin, Arterial 8.0 (L) 12.0 - 16.0 g/dL    POCT Anion Gap, Arterial 15 10 - 25 mmo/L    Patient Temperature 37.0 degrees Celsius   Calcium, ionized   Result Value Ref Range    POCT Calcium, Ionized 1.09 (L) 1.1 - 1.33 mmol/L   Coagulation Screen   Result Value Ref Range    Protime 14.4 (H) 9.8 - 12.8 seconds    INR 1.3 (H) 0.9 - 1.1    aPTT 34 27 - 38 seconds   Haptoglobin   Result Value Ref Range    Haptoglobin <10 (L) 37 - 246 mg/dL   Reticulocytes   Result Value Ref Range    Retic % 1.6 0.5 - 2.0 %    Retic Absolute 0.035 0.018 - 0.083 x10*6/uL    Reticulocyte Hemoglobin 31 28 - 38 pg    Immature Retic fraction 32.3 (H) <=16.0 %   ECMO ARTERIAL FULL PANEL   Result Value Ref Range    POCT pH, Arterial ECMO 7.42 Reference range not established pH    POCT pCO2, Arterial ECMO 32 Reference range not established mm Hg    POCT pO2,  Arterial ECMO 289 Reference range not established mm Hg    POCT SO2, Arterial ECMO 100 Reference range not established %    POCT Oxy Hemoglobin, Arterial ECMO 92.0 Reference range not established %    POCT Hematocrit Calculated, Arterial ECMO 21.0 (L) 36.0 - 46.0 %    POCT Sodium, Arterial  ECMO 132 (L) 136 - 145 mmol/L    POCT Potassium, Arterial  ECMO 3.8 3.5 - 5.3 mmol/L    POCT Chloride, Arterial  ECMO 102 98 - 107 mmol/L    POCT Ionized Calcium, Arterial  ECMO 1.16 1.10 - 1.33 mmol/L    POCT Glucose, Arterial  ECMO 212 (H) 74 - 99 mg/dL    POCT Lactate Arterial  ECMO 0.7 0.4 - 2.0 mmol/L    POCT Base Excess, Arterial ECMO -3.3 Reference range not established mmol/L    POCT  HCO3 Calculated, Arterial ECMO 20.8 Reference range not established mmol/L    POCT Hemoglobin, Arterial  ECMO 7.1 (L) 12.0 - 16.0 g/dL    POCT Anion Gap, Arterial  ECMO 13 Reference range not established mmo/L    Patient Temperature 37.0 degrees Celsius    FiO2 80 %   ECMO VENOUS FULL PANEL   Result Value Ref Range    POCT pH, Venous ECMO 7.35 Reference range not established pH    POCT pCO2, Venous ECMO 40 Reference range not established mm Hg    POCT pO2,  Venous  ECMO 34 Reference range not established mm Hg    POCT SO2, Venous ECMO 75 Reference range not established %    POCT Oxy Hemoglobin, Venous ECMO 68.9 Reference range not established %    POCT Hematocrit Calculated, Venous ECMO 21.0 (L) 36.0 - 46.0 %    POCT Sodium, Venous ECMO 133 (L) 136 - 145 mmol/L    POCT Potassium, Venous ECMO 3.6 3.5 - 5.3 mmol/L    POCT Chloride, Venous ECMO 99 98 - 107 mmol/L    POCT Ionized Calcium, Venous ECMO 1.18 1.10 - 1.33 mmol/L    POCT Glucose, Venous ECMO 223 (H) 74 - 99 mg/dL    POCT Lactate Venous ECMO 0.6 0.4 - 2.0 mmol/L    POCT Base Excess, Venous ECMO -3.2 Reference range not established mmol/L    POCT HCO3 Calculated, Venous ECMO 22.1 Reference range not established mmol/L    POCT Hemoglobin, Venous ECMO 7.0 (L) 12.0 - 16.0 g/dL    POCT Anion Gap, Venous ECMO 16 Reference range not established mmo/L    Patient Temperature 37.0 degrees Celsius    FiO2 80 %   Blood Gas Arterial Full Panel   Result Value Ref Range    POCT pH, Arterial 7.42 7.38 - 7.42 pH    POCT pCO2, Arterial 31 (L) 38 - 42 mm Hg    POCT pO2, Arterial 262 (H) 85 - 95 mm Hg    POCT SO2, Arterial 100 94 - 100 %    POCT Oxy Hemoglobin, Arterial 90.5 (L) 94.0 - 98.0 %    POCT Hematocrit Calculated, Arterial 22.0 (L) 36.0 - 46.0 %    POCT Sodium, Arterial 133 (L) 136 - 145 mmol/L    POCT Potassium, Arterial 3.5 3.5 - 5.3 mmol/L    POCT Chloride, Arterial 101 98 - 107 mmol/L    POCT Ionized Calcium, Arterial 1.13 1.10 - 1.33 mmol/L    POCT Glucose, Arterial  220 (H) 74 - 99 mg/dL    POCT Lactate, Arterial 0.8 0.4 - 2.0 mmol/L    POCT Base Excess, Arterial -3.9 (L) -2.0 - 3.0 mmol/L    POCT HCO3 Calculated, Arterial 20.1 (L) 22.0 - 26.0 mmol/L    POCT Hemoglobin, Arterial 7.2 (L) 12.0 - 16.0 g/dL    POCT Anion Gap, Arterial 15 10 - 25 mmo/L    Patient Temperature 37.0 degrees Celsius    FiO2 80 %   Lactate Dehydrogenase   Result Value Ref Range    LDH 2,859 (H) 84 - 246 U/L   Magnesium   Result Value Ref Range    Magnesium 2.47 (H) 1.60 - 2.40 mg/dL   Renal Function Panel   Result Value Ref Range    Glucose 223 (H) 74 - 99 mg/dL    Sodium 136 136 - 145 mmol/L    Potassium 3.8 3.5 - 5.3 mmol/L    Chloride 101 98 - 107 mmol/L    Bicarbonate 21 21 - 32 mmol/L    Anion Gap 18 10 - 20 mmol/L    Urea Nitrogen 24 (H) 6 - 23 mg/dL    Creatinine 0.75 0.50 - 1.05 mg/dL    eGFR >90 >60 mL/min/1.73m*2    Calcium 7.7 (L) 8.6 - 10.6 mg/dL    Phosphorus <1.0 (LL) 2.5 - 4.9 mg/dL    Albumin 3.6 3.4 - 5.0 g/dL   CBC and Auto Differential   Result Value Ref Range    WBC 14.3 (H) 4.4 - 11.3 x10*3/uL    nRBC 18.3 (H) 0.0 - 0.0 /100 WBCs    RBC 2.19 (L) 4.00 - 5.20 x10*6/uL    Hemoglobin 7.6 (L) 12.0 - 16.0 g/dL    Hematocrit 18.2 (L) 36.0 - 46.0 %    MCV 83 80 - 100 fL    MCH 34.7 (H) 26.0 - 34.0 pg    MCHC 41.8 (H) 32.0 - 36.0 g/dL    RDW 17.4 (H) 11.5 - 14.5 %    Platelets 43 (L) 150 - 450 x10*3/uL    Immature Granulocytes %, Automated 7.4 (H) 0.0 - 0.9 %    Immature Granulocytes Absolute, Automated 1.06 (H) 0.00 - 0.70 x10*3/uL   Heparin Assay, UFH   Result Value Ref Range    Heparin Unfractionated <0.1 See Comment Below for Therapeutic Ranges IU/mL   Manual Differential   Result Value Ref Range    Neutrophils %, Manual 87.1 40.0 - 80.0 %    Bands %, Manual 1.0 0.0 - 5.0 %    Lymphocytes %, Manual 3.9 13.0 - 44.0 %    Monocytes %, Manual 3.0 2.0 - 10.0 %    Eosinophils %, Manual 1.0 0.0 - 6.0 %    Basophils %, Manual 0.0 0.0 - 2.0 %    Myelocytes %, Manual 4.0 0.0 - 0.0 %    Seg  Neutrophils Absolute, Manual 12.46 (H) 1.20 - 7.00 x10*3/uL    Bands Absolute, Manual 0.14 0.00 - 0.70 x10*3/uL    Lymphocytes Absolute, Manual 0.56 (L) 1.20 - 4.80 x10*3/uL    Monocytes Absolute, Manual 0.43 0.10 - 1.00 x10*3/uL    Eosinophils Absolute, Manual 0.14 0.00 - 0.70 x10*3/uL    Basophils Absolute, Manual 0.00 0.00 - 0.10 x10*3/uL    Myelocytes Absolute, Manual 0.57 0.00 - 0.00 x10*3/uL    Total Cells Counted 101     Neutrophils Absolute, Manual 12.60 (H) 1.20 - 7.70 x10*3/uL    RBC Morphology See Below     RBC Fragments Few     [place] Cells Few    Renal Function Panel   Result Value Ref Range    Glucose 241 (H) 74 - 99 mg/dL    Sodium 135 (L) 136 - 145 mmol/L    Potassium 3.6 3.5 - 5.3 mmol/L    Chloride 99 98 - 107 mmol/L    Bicarbonate 23 21 - 32 mmol/L    Anion Gap 17 10 - 20 mmol/L    Urea Nitrogen 23 6 - 23 mg/dL    Creatinine 0.65 0.50 - 1.05 mg/dL    eGFR >90 >60 mL/min/1.73m*2    Calcium 7.7 (L) 8.6 - 10.6 mg/dL    Phosphorus <1.0 (LL) 2.5 - 4.9 mg/dL    Albumin 3.6 3.4 - 5.0 g/dL   Blood Gas Arterial Full Panel   Result Value Ref Range    POCT pH, Arterial 7.44 (H) 7.38 - 7.42 pH    POCT pCO2, Arterial 29 (L) 38 - 42 mm Hg    POCT pO2, Arterial 234 (H) 85 - 95 mm Hg    POCT SO2, Arterial 100 94 - 100 %    POCT Oxy Hemoglobin, Arterial 93.6 (L) 94.0 - 98.0 %    POCT Hematocrit Calculated, Arterial 20.0 (L) 36.0 - 46.0 %    POCT Sodium, Arterial 131 (L) 136 - 145 mmol/L    POCT Potassium, Arterial 3.6 3.5 - 5.3 mmol/L    POCT Chloride, Arterial 101 98 - 107 mmol/L    POCT Ionized Calcium, Arterial 1.11 1.10 - 1.33 mmol/L    POCT Glucose, Arterial 367 (H) 74 - 99 mg/dL    POCT Lactate, Arterial 1.2 0.4 - 2.0 mmol/L    POCT Base Excess, Arterial -4.0 (L) -2.0 - 3.0 mmol/L    POCT HCO3 Calculated, Arterial 19.7 (L) 22.0 - 26.0 mmol/L    POCT Hemoglobin, Arterial 6.7 (L) 12.0 - 16.0 g/dL    POCT Anion Gap, Arterial 14 10 - 25 mmo/L    Patient Temperature 37.0 degrees Celsius    FiO2 80 %    Site of  Arterial Puncture Arterial Line    ECMO ARTERIAL FULL PANEL   Result Value Ref Range    POCT pH, Arterial ECMO 7.39 Reference range not established pH    POCT pCO2, Arterial ECMO 30 Reference range not established mm Hg    POCT pO2,  Arterial ECMO 225 Reference range not established mm Hg    POCT SO2, Arterial ECMO 100 Reference range not established %    POCT Oxy Hemoglobin, Arterial ECMO 90.2 Reference range not established %    POCT Hematocrit Calculated, Arterial ECMO 15.0 (L) 36.0 - 46.0 %    POCT Sodium, Arterial  ECMO 134 (L) 136 - 145 mmol/L    POCT Potassium, Arterial  ECMO 3.1 (L) 3.5 - 5.3 mmol/L    POCT Chloride, Arterial  ECMO      POCT Ionized Calcium, Arterial  ECMO 1.07 (L) 1.10 - 1.33 mmol/L    POCT Glucose, Arterial  ECMO 327 (H) 74 - 99 mg/dL    POCT Lactate Arterial  ECMO 0.8 0.4 - 2.0 mmol/L    POCT Base Excess, Arterial ECMO -6.3 Reference range not established mmol/L    POCT HCO3 Calculated, Arterial ECMO 18.2 Reference range not established mmol/L    POCT Hemoglobin, Arterial  ECMO 5.1 (LL) 12.0 - 16.0 g/dL    POCT Anion Gap, Arterial  ECMO      Patient Temperature 37.0 degrees Celsius    FiO2 80 %   Blood Gas Arterial Full Panel   Result Value Ref Range    POCT pH, Arterial 7.46 (H) 7.38 - 7.42 pH    POCT pCO2, Arterial 27 (L) 38 - 42 mm Hg    POCT pO2, Arterial 219 (H) 85 - 95 mm Hg    POCT SO2, Arterial 100 94 - 100 %    POCT Oxy Hemoglobin, Arterial 91.8 (L) 94.0 - 98.0 %    POCT Hematocrit Calculated, Arterial 20.0 (L) 36.0 - 46.0 %    POCT Sodium, Arterial 131 (L) 136 - 145 mmol/L    POCT Potassium, Arterial 3.1 (L) 3.5 - 5.3 mmol/L    POCT Chloride, Arterial 100 98 - 107 mmol/L    POCT Ionized Calcium, Arterial 1.05 (L) 1.10 - 1.33 mmol/L    POCT Glucose, Arterial 374 (H) 74 - 99 mg/dL    POCT Lactate, Arterial 1.4 0.4 - 2.0 mmol/L    POCT Base Excess, Arterial -4.1 (L) -2.0 - 3.0 mmol/L    POCT HCO3 Calculated, Arterial 19.2 (L) 22.0 - 26.0 mmol/L    POCT Hemoglobin, Arterial 6.6 (L)  12.0 - 16.0 g/dL    POCT Anion Gap, Arterial 15 10 - 25 mmo/L    Patient Temperature 37.0 degrees Celsius    FiO2 60 %    Site of Arterial Puncture Arterial Line      *Note: Due to a large number of results and/or encounters for the requested time period, some results have not been displayed. A complete set of results can be found in Results Review.        ASSESSMENT AND PLAN:    Ms. Yimi Bowles is a 34 y.o. female who underwent a kidney transplant surgery  on 3/31/2022 (Heart).    Principal Problem:    Cardiogenic shock (CMS/HCC)  Active Problems:    Heart transplant recipient (CMS/[facility])    ESRD (end stage renal disease) on dialysis (CMS/[facility])    HIRAM (acute kidney injury) (CMS/[facility])    Acute passive congestion of liver    Hyponatremia    Iron deficiency anemia    Abdominal pain    Cardiac transplant rejection (CMS/[facility])    Acute combined systolic (congestive) and diastolic (congestive) heart failure (CMS/[facility])      CRRT Management Note:    - Patient was seen and examined while on CVVH   - Lab was reviewed  - CVVH order was reviewed  -Discussed with primary team  -Discussed with RN   - Will continue CVVH for now  -Daily evaluation for indications for CVVH and will convert it to regular IHD once the patient is more hemodynamically stable.    Heart and Kidney transplant Candidacy :   Will meet the OPTN eligibility criteria for kidney transplant on 3/29/24 for sustained HIRAM over 6 weeks. Pre tx work up - per heart tx team.   Note:  GFR 2/17/24=23  CVVH started 2/19 and has remained on CVVH still  She is approved for CANDIDATE PENDING HEART TRANSPLANT APPROVAL at kidney transplant selection committee 3/28/24    [Sustained acute kidney injury : At least one of the following, or a combination of both of the following, for the last 6 weeks:    That the candidate has been on dialysis at least once every 7 days.    That the candidate has a measured or calculated CrCl or GFR less than or equal to 25 mL/min at least once every 7  days]      * Case was discussed with primary team.  For questions, please contact transplant nephrology page x [phone]    Russ Bergeron    Transplant Nephrologist

## 2024-04-18 NOTE — PROGRESS NOTES
04/18/24  ID update  Discussed with primary team  Now encephalopathic and intubated  Not escalating care    4/17/24 BAL pending   On meropenem, vancomycin, voriconazole started on 4/18/2024    Unfortunate and critical situation that is quite grave.  Team and family may transition to comfort care    ID team A will sign off       Wilmer Payton MD

## 2024-04-18 NOTE — PROGRESS NOTES
Physical Therapy                 Therapy Communication Note    Patient Name: Charla Bowles  MRN: [id number]  Today's Date: 4/18/2024     Discipline: Physical Therapy    Missed Visit Reason: Missed Visit Reason:  (Pt got re-intubated 4/17, will hold PT this date and re-attempt once medically stable/appropriate.)    Missed Time: Attempt

## 2024-04-18 NOTE — PROGRESS NOTES
[facility]  ADVANCED HEART FAILURE AND TRANSPLANT CARDIOLOGY   Charla Bowles/[id number]    Admit Date: 2/15/2024  Hospital Length of Stay: 63   ICU Length of Stay: 21d 17h   Primary Service: CTICU    Charla Bowles is a 34 y.o. female on day 63 of admission presenting with Cardiogenic shock (Multi).    Subjective   JOSE due to sedation    Objective     Physical Exam  Constitutional:       Appearance: She is ill-appearing.      Comments: Sedated on propofol infusion    HENT:      Head: Normocephalic.      Comments: Not actively bleeding      Mouth/Throat:      Mouth: Mucous membranes are moist.      Pharynx: Oropharyngeal exudate (Dried blood within oral cavity) present. No posterior oropharyngeal erythema.   Eyes:      General: Scleral icterus present.      Extraocular Movements: Extraocular movements intact.      Pupils: Pupils are equal, round, and reactive to light.   Neck:      Vascular: No JVD.   Cardiovascular:      Rate and Rhythm: Normal rate.      Comments: Right axillary impella in place ~45cm at hub (no hematoma), Right femoral VA ECMO in place with reperfusion catheter (site appears clean and dry). Dopplerable radial and ulnar pulses bilaterally. Dopplerable PT pulses bilateral. Unable to doppler DP bilaterally.  Pulmonary:      Effort: No accessory muscle usage or retractions.      Breath sounds: Rhonchi and rales present. No wheezing.      Comments: Intubated on ventilator. ET tube with scant bloody sputum.  Abdominal:      General: Abdomen is flat. Bowel sounds are normal. There is no distension.      Palpations: Abdomen is soft. There is no mass.      Hernia: No hernia is present.   Musculoskeletal:         General: No swelling, tenderness or signs of injury. Normal range of motion.      Cervical back: Full passive range of motion without pain.   Skin:     General: Skin is dry.      Capillary Refill: Capillary refill takes less than 2 seconds.      Coloration:  "Skin is jaundiced.      Findings: Bruising and lesion (Left groin wound with slough.) present. No erythema, laceration, rash or wound.   Neurological:      Comments: JOSE due to sedation   Psychiatric:         Mood and Affect: Mood is not anxious.      Comments: Anxious       Last Recorded Vitals  Blood pressure (!) 103/93, pulse 103, temperature 36.6 °C (97.9 °F), temperature source Axillary, resp. rate 12, height 1.549 m (5' 0.98\"), weight 78.7 kg (173 lb 8 oz), SpO2 98%.  Intake/Output last 3 Shifts:  I/O last 3 completed shifts:  In: 2759.6 (32.9 mL/kg) [I.V.:1148.5 (13.7 mL/kg); Blood:950; IV Piggyback:300]  Out: 991 (11.8 mL/kg) [Emesis/NG output:200; Other:791]  Weight: 84 kg     Relevant Results  Scheduled medications  calcium carbonate-vitamin D3, 1 tablet, oral, Daily  cyanocobalamin, 500 mcg, oral, Daily  darbepoetin floyd, 100 mcg, subcutaneous, q14 days  ferrous sulfate (325 mg ferrous sulfate), 65 mg of iron, oral, Daily with breakfast  folic acid, 1 mg, oral, Daily  insulin lispro, 0-15 Units, subcutaneous, TID with meals  levothyroxine, 90 mcg, intravenous, q24h TRICIA  lidocaine, 1 patch, transdermal, Daily  melatonin, 10 mg, oral, Daily  meropenem, 1 g, intravenous, q12h  multivitamin with minerals, 1 tablet, oral, Daily  mupirocin, 1 Application, Each Nostril, BID  nystatin, 5 mL, Swish & Swallow, q6h  pantoprazole, 40 mg, intravenous, Daily  polyethylene glycol, 17 g, oral, Daily  potassium, sodium phosphates, 1 packet, oral, 4x daily  predniSONE, 10 mg, oral, Daily  sennosides-docusate sodium, 2 tablet, oral, BID  sulfamethoxazole-trimethoprim, 80 mg of trimethoprim, oral, Daily  valGANciclovir, 450 mg, oral, q48h  vancomycin, 750 mg, intravenous, q12h  voriconazole, 4 mg/kg, intravenous, q12h      Continuous medications  Adult Clinimix Parenteral Nutrition, 30 mL/hr, Last Rate: 30 mL/hr (04/18/24 1200)  dexmedeTOMIDine, 0.1-1.5 mcg/kg/hr, Last Rate: Stopped (04/17/24 1615)  lactated Ringer's, 5 " mL/hr, Last Rate: 5 mL/hr (04/18/24 1200)  PrismaSol 4/2.5, 2,400 mL/hr, Last Rate: 2,400 mL/hr (04/13/24 1700)  propofol, 5-50 mcg/kg/min (Dosing Weight), Last Rate: 30 mcg/kg/min (04/18/24 1218)  sodium bicarbonate infusion Impella Purge 25 mEq/1000 mL D5W, 10 mL/hr, Last Rate: 10 mL/hr (04/18/24 1200)      PRN medications  PRN medications: acetaminophen **OR** [DISCONTINUED] acetaminophen, alteplase, bisacodyl, calcium gluconate, calcium gluconate, dextrose **OR** glucagon, hydrALAZINE, HYDROmorphone, ipratropium-albuteroL, artificial tears, magnesium sulfate, magnesium sulfate, microfibrllar collagen, mupirocin, naloxone, ondansetron, oxyCODONE, oxygen, sodium chloride, vancomycin     Results for orders placed or performed during the hospital encounter of 02/15/24 (from the past 24 hour(s))   Blood Gas Arterial Full Panel Unsolicited   Result Value Ref Range    POCT pH, Arterial 7.34 (L) 7.38 - 7.42 pH    POCT pCO2, Arterial 44 (H) 38 - 42 mm Hg    POCT pO2, Arterial 134 (H) 85 - 95 mm Hg    POCT SO2, Arterial 100 94 - 100 %    POCT Oxy Hemoglobin, Arterial 91.5 (L) 94.0 - 98.0 %    POCT Hematocrit Calculated, Arterial 26.0 (L) 36.0 - 46.0 %    POCT Sodium, Arterial 132 (L) 136 - 145 mmol/L    POCT Potassium, Arterial 4.7 3.5 - 5.3 mmol/L    POCT Chloride, Arterial 104 98 - 107 mmol/L    POCT Ionized Calcium, Arterial 1.10 1.10 - 1.33 mmol/L    POCT Glucose, Arterial 100 (H) 74 - 99 mg/dL    POCT Lactate, Arterial 1.0 0.4 - 2.0 mmol/L    POCT Base Excess, Arterial -2.0 -2.0 - 3.0 mmol/L    POCT HCO3 Calculated, Arterial 23.7 22.0 - 26.0 mmol/L    POCT Hemoglobin, Arterial 8.7 (L) 12.0 - 16.0 g/dL    POCT Anion Gap, Arterial 9 (L) 10 - 25 mmo/L    Patient Temperature 37.0 degrees Celsius   Blood Gas Arterial Full Panel   Result Value Ref Range    POCT pH, Arterial 7.34 (L) 7.38 - 7.42 pH    POCT pCO2, Arterial 44 (H) 38 - 42 mm Hg    POCT pO2, Arterial 121 (H) 85 - 95 mm Hg    POCT SO2, Arterial 100 94 - 100 %     POCT Oxy Hemoglobin, Arterial 91.1 (L) 94.0 - 98.0 %    POCT Hematocrit Calculated, Arterial 25.0 (L) 36.0 - 46.0 %    POCT Sodium, Arterial 133 (L) 136 - 145 mmol/L    POCT Potassium, Arterial 4.8 3.5 - 5.3 mmol/L    POCT Chloride, Arterial 103 98 - 107 mmol/L    POCT Ionized Calcium, Arterial 1.10 1.10 - 1.33 mmol/L    POCT Glucose, Arterial 110 (H) 74 - 99 mg/dL    POCT Lactate, Arterial 1.0 0.4 - 2.0 mmol/L    POCT Base Excess, Arterial -2.0 -2.0 - 3.0 mmol/L    POCT HCO3 Calculated, Arterial 23.7 22.0 - 26.0 mmol/L    POCT Hemoglobin, Arterial 8.3 (L) 12.0 - 16.0 g/dL    POCT Anion Gap, Arterial 11 10 - 25 mmo/L    Patient Temperature 37.0 degrees Celsius    FiO2 21 %   CBC   Result Value Ref Range    WBC 19.7 (H) 4.4 - 11.3 x10*3/uL    nRBC 22.6 (H) 0.0 - 0.0 /100 WBCs    RBC 2.65 (L) 4.00 - 5.20 x10*6/uL    Hemoglobin 8.0 (L) 12.0 - 16.0 g/dL    Hematocrit 21.5 (L) 36.0 - 46.0 %    MCV 81 80 - 100 fL    MCH 30.2 26.0 - 34.0 pg    MCHC 37.2 (H) 32.0 - 36.0 g/dL    RDW 16.6 (H) 11.5 - 14.5 %    Platelets 73 (L) 150 - 450 x10*3/uL   Blood Gas Arterial Full Panel Unsolicited   Result Value Ref Range    POCT pH, Arterial 7.34 (L) 7.38 - 7.42 pH    POCT pCO2, Arterial 45 (H) 38 - 42 mm Hg    POCT pO2, Arterial 107 (H) 85 - 95 mm Hg    POCT SO2, Arterial 100 94 - 100 %    POCT Oxy Hemoglobin, Arterial 90.8 (L) 94.0 - 98.0 %    POCT Hematocrit Calculated, Arterial 25.0 (L) 36.0 - 46.0 %    POCT Sodium, Arterial 133 (L) 136 - 145 mmol/L    POCT Potassium, Arterial 4.7 3.5 - 5.3 mmol/L    POCT Chloride, Arterial 103 98 - 107 mmol/L    POCT Ionized Calcium, Arterial 1.11 1.10 - 1.33 mmol/L    POCT Glucose, Arterial 133 (H) 74 - 99 mg/dL    POCT Lactate, Arterial 1.1 0.4 - 2.0 mmol/L    POCT Base Excess, Arterial -1.5 -2.0 - 3.0 mmol/L    POCT HCO3 Calculated, Arterial 24.3 22.0 - 26.0 mmol/L    POCT Hemoglobin, Arterial 8.3 (L) 12.0 - 16.0 g/dL    POCT Anion Gap, Arterial 10 10 - 25 mmo/L    Patient Temperature 37.0  degrees Celsius   Blood Gas Arterial Full Panel Unsolicited   Result Value Ref Range    POCT pH, Arterial 7.34 (L) 7.38 - 7.42 pH    POCT pCO2, Arterial 42 38 - 42 mm Hg    POCT pO2, Arterial 121 (H) 85 - 95 mm Hg    POCT SO2, Arterial 100 94 - 100 %    POCT Oxy Hemoglobin, Arterial 90.8 (L) 94.0 - 98.0 %    POCT Hematocrit Calculated, Arterial 24.0 (L) 36.0 - 46.0 %    POCT Sodium, Arterial 134 (L) 136 - 145 mmol/L    POCT Potassium, Arterial 4.6 3.5 - 5.3 mmol/L    POCT Chloride, Arterial 103 98 - 107 mmol/L    POCT Ionized Calcium, Arterial 1.09 (L) 1.10 - 1.33 mmol/L    POCT Glucose, Arterial 126 (H) 74 - 99 mg/dL    POCT Lactate, Arterial 1.0 0.4 - 2.0 mmol/L    POCT Base Excess, Arterial -2.9 (L) -2.0 - 3.0 mmol/L    POCT HCO3 Calculated, Arterial 22.7 22.0 - 26.0 mmol/L    POCT Hemoglobin, Arterial 8.1 (L) 12.0 - 16.0 g/dL    POCT Anion Gap, Arterial 13 10 - 25 mmo/L    Patient Temperature 37.0 degrees Celsius   SST TOP   Result Value Ref Range    Extra Tube Hold for add-ons.    Blood Gas Arterial Full Panel Unsolicited   Result Value Ref Range    POCT pH, Arterial 7.42 7.38 - 7.42 pH    POCT pCO2, Arterial 35 (L) 38 - 42 mm Hg    POCT pO2, Arterial 408 (H) 85 - 95 mm Hg    POCT SO2, Arterial 100 94 - 100 %    POCT Oxy Hemoglobin, Arterial 90.5 (L) 94.0 - 98.0 %    POCT Hematocrit Calculated, Arterial 23.0 (L) 36.0 - 46.0 %    POCT Sodium, Arterial 133 (L) 136 - 145 mmol/L    POCT Potassium, Arterial 4.2 3.5 - 5.3 mmol/L    POCT Chloride, Arterial      POCT Ionized Calcium, Arterial 1.08 (L) 1.10 - 1.33 mmol/L    POCT Glucose, Arterial 135 (H) 74 - 99 mg/dL    POCT Lactate, Arterial 0.9 0.4 - 2.0 mmol/L    POCT Base Excess, Arterial -1.5 -2.0 - 3.0 mmol/L    POCT HCO3 Calculated, Arterial 22.7 22.0 - 26.0 mmol/L    POCT Hemoglobin, Arterial 7.8 (L) 12.0 - 16.0 g/dL    POCT Anion Gap, Arterial      Patient Temperature 37.0 degrees Celsius   Respiratory Culture/Smear    Specimen: BAL; Fluid   Result Value Ref  Range    Gram Stain (1+) Rare Polymorphonuclear leukocytes     Gram Stain No organisms seen    Blood Gas Arterial Full Panel Unsolicited   Result Value Ref Range    POCT pH, Arterial 7.35 (L) 7.38 - 7.42 pH    POCT pCO2, Arterial 41 38 - 42 mm Hg    POCT pO2, Arterial 109 (H) 85 - 95 mm Hg    POCT SO2, Arterial 100 94 - 100 %    POCT Oxy Hemoglobin, Arterial 89.7 (L) 94.0 - 98.0 %    POCT Hematocrit Calculated, Arterial 24.0 (L) 36.0 - 46.0 %    POCT Sodium, Arterial 134 (L) 136 - 145 mmol/L    POCT Potassium, Arterial 4.5 3.5 - 5.3 mmol/L    POCT Chloride, Arterial 101 98 - 107 mmol/L    POCT Ionized Calcium, Arterial 1.12 1.10 - 1.33 mmol/L    POCT Glucose, Arterial 162 (H) 74 - 99 mg/dL    POCT Lactate, Arterial 1.1 0.4 - 2.0 mmol/L    POCT Base Excess, Arterial -2.8 (L) -2.0 - 3.0 mmol/L    POCT HCO3 Calculated, Arterial 22.6 22.0 - 26.0 mmol/L    POCT Hemoglobin, Arterial 8.0 (L) 12.0 - 16.0 g/dL    POCT Anion Gap, Arterial 15 10 - 25 mmo/L    Patient Temperature 37.0 degrees Celsius   Calcium, ionized   Result Value Ref Range    POCT Calcium, Ionized 1.09 (L) 1.1 - 1.33 mmol/L   Coagulation Screen   Result Value Ref Range    Protime 14.4 (H) 9.8 - 12.8 seconds    INR 1.3 (H) 0.9 - 1.1    aPTT 34 27 - 38 seconds   Haptoglobin   Result Value Ref Range    Haptoglobin <10 (L) 37 - 246 mg/dL   Reticulocytes   Result Value Ref Range    Retic % 1.6 0.5 - 2.0 %    Retic Absolute 0.035 0.018 - 0.083 x10*6/uL    Reticulocyte Hemoglobin 31 28 - 38 pg    Immature Retic fraction 32.3 (H) <=16.0 %   ECMO ARTERIAL FULL PANEL   Result Value Ref Range    POCT pH, Arterial ECMO 7.42 Reference range not established pH    POCT pCO2, Arterial ECMO 32 Reference range not established mm Hg    POCT pO2,  Arterial ECMO 289 Reference range not established mm Hg    POCT SO2, Arterial ECMO 100 Reference range not established %    POCT Oxy Hemoglobin, Arterial ECMO 92.0 Reference range not established %    POCT Hematocrit Calculated,  Arterial ECMO 21.0 (L) 36.0 - 46.0 %    POCT Sodium, Arterial  ECMO 132 (L) 136 - 145 mmol/L    POCT Potassium, Arterial  ECMO 3.8 3.5 - 5.3 mmol/L    POCT Chloride, Arterial  ECMO 102 98 - 107 mmol/L    POCT Ionized Calcium, Arterial  ECMO 1.16 1.10 - 1.33 mmol/L    POCT Glucose, Arterial  ECMO 212 (H) 74 - 99 mg/dL    POCT Lactate Arterial  ECMO 0.7 0.4 - 2.0 mmol/L    POCT Base Excess, Arterial ECMO -3.3 Reference range not established mmol/L    POCT HCO3 Calculated, Arterial ECMO 20.8 Reference range not established mmol/L    POCT Hemoglobin, Arterial  ECMO 7.1 (L) 12.0 - 16.0 g/dL    POCT Anion Gap, Arterial  ECMO 13 Reference range not established mmo/L    Patient Temperature 37.0 degrees Celsius    FiO2 80 %   ECMO VENOUS FULL PANEL   Result Value Ref Range    POCT pH, Venous ECMO 7.35 Reference range not established pH    POCT pCO2, Venous ECMO 40 Reference range not established mm Hg    POCT pO2,  Venous  ECMO 34 Reference range not established mm Hg    POCT SO2, Venous ECMO 75 Reference range not established %    POCT Oxy Hemoglobin, Venous ECMO 68.9 Reference range not established %    POCT Hematocrit Calculated, Venous ECMO 21.0 (L) 36.0 - 46.0 %    POCT Sodium, Venous ECMO 133 (L) 136 - 145 mmol/L    POCT Potassium, Venous ECMO 3.6 3.5 - 5.3 mmol/L    POCT Chloride, Venous ECMO 99 98 - 107 mmol/L    POCT Ionized Calcium, Venous ECMO 1.18 1.10 - 1.33 mmol/L    POCT Glucose, Venous ECMO 223 (H) 74 - 99 mg/dL    POCT Lactate Venous ECMO 0.6 0.4 - 2.0 mmol/L    POCT Base Excess, Venous ECMO -3.2 Reference range not established mmol/L    POCT HCO3 Calculated, Venous ECMO 22.1 Reference range not established mmol/L    POCT Hemoglobin, Venous ECMO 7.0 (L) 12.0 - 16.0 g/dL    POCT Anion Gap, Venous ECMO 16 Reference range not established mmo/L    Patient Temperature 37.0 degrees Celsius    FiO2 80 %   Blood Gas Arterial Full Panel   Result Value Ref Range    POCT pH, Arterial 7.42 7.38 - 7.42 pH    POCT pCO2,  Arterial 31 (L) 38 - 42 mm Hg    POCT pO2, Arterial 262 (H) 85 - 95 mm Hg    POCT SO2, Arterial 100 94 - 100 %    POCT Oxy Hemoglobin, Arterial 90.5 (L) 94.0 - 98.0 %    POCT Hematocrit Calculated, Arterial 22.0 (L) 36.0 - 46.0 %    POCT Sodium, Arterial 133 (L) 136 - 145 mmol/L    POCT Potassium, Arterial 3.5 3.5 - 5.3 mmol/L    POCT Chloride, Arterial 101 98 - 107 mmol/L    POCT Ionized Calcium, Arterial 1.13 1.10 - 1.33 mmol/L    POCT Glucose, Arterial 220 (H) 74 - 99 mg/dL    POCT Lactate, Arterial 0.8 0.4 - 2.0 mmol/L    POCT Base Excess, Arterial -3.9 (L) -2.0 - 3.0 mmol/L    POCT HCO3 Calculated, Arterial 20.1 (L) 22.0 - 26.0 mmol/L    POCT Hemoglobin, Arterial 7.2 (L) 12.0 - 16.0 g/dL    POCT Anion Gap, Arterial 15 10 - 25 mmo/L    Patient Temperature 37.0 degrees Celsius    FiO2 80 %   Lactate Dehydrogenase   Result Value Ref Range    LDH 2,859 (H) 84 - 246 U/L   Magnesium   Result Value Ref Range    Magnesium 2.47 (H) 1.60 - 2.40 mg/dL   Renal Function Panel   Result Value Ref Range    Glucose 223 (H) 74 - 99 mg/dL    Sodium 136 136 - 145 mmol/L    Potassium 3.8 3.5 - 5.3 mmol/L    Chloride 101 98 - 107 mmol/L    Bicarbonate 21 21 - 32 mmol/L    Anion Gap 18 10 - 20 mmol/L    Urea Nitrogen 24 (H) 6 - 23 mg/dL    Creatinine 0.75 0.50 - 1.05 mg/dL    eGFR >90 >60 mL/min/1.73m*2    Calcium 7.7 (L) 8.6 - 10.6 mg/dL    Phosphorus <1.0 (LL) 2.5 - 4.9 mg/dL    Albumin 3.6 3.4 - 5.0 g/dL   CBC and Auto Differential   Result Value Ref Range    WBC 14.3 (H) 4.4 - 11.3 x10*3/uL    nRBC 18.3 (H) 0.0 - 0.0 /100 WBCs    RBC 2.19 (L) 4.00 - 5.20 x10*6/uL    Hemoglobin 7.6 (L) 12.0 - 16.0 g/dL    Hematocrit 18.2 (L) 36.0 - 46.0 %    MCV 83 80 - 100 fL    MCH 34.7 (H) 26.0 - 34.0 pg    MCHC 41.8 (H) 32.0 - 36.0 g/dL    RDW 17.4 (H) 11.5 - 14.5 %    Platelets 43 (L) 150 - 450 x10*3/uL    Immature Granulocytes %, Automated 7.4 (H) 0.0 - 0.9 %    Immature Granulocytes Absolute, Automated 1.06 (H) 0.00 - 0.70 x10*3/uL    Heparin Assay, UFH   Result Value Ref Range    Heparin Unfractionated <0.1 See Comment Below for Therapeutic Ranges IU/mL   Manual Differential   Result Value Ref Range    Neutrophils %, Manual 87.1 40.0 - 80.0 %    Bands %, Manual 1.0 0.0 - 5.0 %    Lymphocytes %, Manual 3.9 13.0 - 44.0 %    Monocytes %, Manual 3.0 2.0 - 10.0 %    Eosinophils %, Manual 1.0 0.0 - 6.0 %    Basophils %, Manual 0.0 0.0 - 2.0 %    Myelocytes %, Manual 4.0 0.0 - 0.0 %    Seg Neutrophils Absolute, Manual 12.46 (H) 1.20 - 7.00 x10*3/uL    Bands Absolute, Manual 0.14 0.00 - 0.70 x10*3/uL    Lymphocytes Absolute, Manual 0.56 (L) 1.20 - 4.80 x10*3/uL    Monocytes Absolute, Manual 0.43 0.10 - 1.00 x10*3/uL    Eosinophils Absolute, Manual 0.14 0.00 - 0.70 x10*3/uL    Basophils Absolute, Manual 0.00 0.00 - 0.10 x10*3/uL    Myelocytes Absolute, Manual 0.57 0.00 - 0.00 x10*3/uL    Total Cells Counted 101     Neutrophils Absolute, Manual 12.60 (H) 1.20 - 7.70 x10*3/uL    RBC Morphology See Below     RBC Fragments Few     Reno Cells Few    Renal Function Panel   Result Value Ref Range    Glucose 241 (H) 74 - 99 mg/dL    Sodium 135 (L) 136 - 145 mmol/L    Potassium 3.6 3.5 - 5.3 mmol/L    Chloride 99 98 - 107 mmol/L    Bicarbonate 23 21 - 32 mmol/L    Anion Gap 17 10 - 20 mmol/L    Urea Nitrogen 23 6 - 23 mg/dL    Creatinine 0.65 0.50 - 1.05 mg/dL    eGFR >90 >60 mL/min/1.73m*2    Calcium 7.7 (L) 8.6 - 10.6 mg/dL    Phosphorus <1.0 (LL) 2.5 - 4.9 mg/dL    Albumin 3.6 3.4 - 5.0 g/dL   Blood Gas Arterial Full Panel   Result Value Ref Range    POCT pH, Arterial 7.44 (H) 7.38 - 7.42 pH    POCT pCO2, Arterial 29 (L) 38 - 42 mm Hg    POCT pO2, Arterial 234 (H) 85 - 95 mm Hg    POCT SO2, Arterial 100 94 - 100 %    POCT Oxy Hemoglobin, Arterial 93.6 (L) 94.0 - 98.0 %    POCT Hematocrit Calculated, Arterial 20.0 (L) 36.0 - 46.0 %    POCT Sodium, Arterial 131 (L) 136 - 145 mmol/L    POCT Potassium, Arterial 3.6 3.5 - 5.3 mmol/L    POCT Chloride, Arterial 101  98 - 107 mmol/L    POCT Ionized Calcium, Arterial 1.11 1.10 - 1.33 mmol/L    POCT Glucose, Arterial 367 (H) 74 - 99 mg/dL    POCT Lactate, Arterial 1.2 0.4 - 2.0 mmol/L    POCT Base Excess, Arterial -4.0 (L) -2.0 - 3.0 mmol/L    POCT HCO3 Calculated, Arterial 19.7 (L) 22.0 - 26.0 mmol/L    POCT Hemoglobin, Arterial 6.7 (L) 12.0 - 16.0 g/dL    POCT Anion Gap, Arterial 14 10 - 25 mmo/L    Patient Temperature 37.0 degrees Celsius    FiO2 80 %    Site of Arterial Puncture Arterial Line    ECMO ARTERIAL FULL PANEL   Result Value Ref Range    POCT pH, Arterial ECMO 7.39 Reference range not established pH    POCT pCO2, Arterial ECMO 30 Reference range not established mm Hg    POCT pO2,  Arterial ECMO 225 Reference range not established mm Hg    POCT SO2, Arterial ECMO 100 Reference range not established %    POCT Oxy Hemoglobin, Arterial ECMO 90.2 Reference range not established %    POCT Hematocrit Calculated, Arterial ECMO 15.0 (L) 36.0 - 46.0 %    POCT Sodium, Arterial  ECMO 134 (L) 136 - 145 mmol/L    POCT Potassium, Arterial  ECMO 3.1 (L) 3.5 - 5.3 mmol/L    POCT Chloride, Arterial  ECMO      POCT Ionized Calcium, Arterial  ECMO 1.07 (L) 1.10 - 1.33 mmol/L    POCT Glucose, Arterial  ECMO 327 (H) 74 - 99 mg/dL    POCT Lactate Arterial  ECMO 0.8 0.4 - 2.0 mmol/L    POCT Base Excess, Arterial ECMO -6.3 Reference range not established mmol/L    POCT HCO3 Calculated, Arterial ECMO 18.2 Reference range not established mmol/L    POCT Hemoglobin, Arterial  ECMO 5.1 (LL) 12.0 - 16.0 g/dL    POCT Anion Gap, Arterial  ECMO      Patient Temperature 37.0 degrees Celsius    FiO2 80 %   Blood Gas Arterial Full Panel   Result Value Ref Range    POCT pH, Arterial 7.46 (H) 7.38 - 7.42 pH    POCT pCO2, Arterial 27 (L) 38 - 42 mm Hg    POCT pO2, Arterial 219 (H) 85 - 95 mm Hg    POCT SO2, Arterial 100 94 - 100 %    POCT Oxy Hemoglobin, Arterial 91.8 (L) 94.0 - 98.0 %    POCT Hematocrit Calculated, Arterial 20.0 (L) 36.0 - 46.0 %    POCT  Sodium, Arterial 131 (L) 136 - 145 mmol/L    POCT Potassium, Arterial 3.1 (L) 3.5 - 5.3 mmol/L    POCT Chloride, Arterial 100 98 - 107 mmol/L    POCT Ionized Calcium, Arterial 1.05 (L) 1.10 - 1.33 mmol/L    POCT Glucose, Arterial 374 (H) 74 - 99 mg/dL    POCT Lactate, Arterial 1.4 0.4 - 2.0 mmol/L    POCT Base Excess, Arterial -4.1 (L) -2.0 - 3.0 mmol/L    POCT HCO3 Calculated, Arterial 19.2 (L) 22.0 - 26.0 mmol/L    POCT Hemoglobin, Arterial 6.6 (L) 12.0 - 16.0 g/dL    POCT Anion Gap, Arterial 15 10 - 25 mmo/L    Patient Temperature 37.0 degrees Celsius    FiO2 60 %    Site of Arterial Puncture Arterial Line      *Note: Due to a large number of results and/or encounters for the requested time period, some results have not been displayed. A complete set of results can be found in Results Review.       Malnutrition Diagnosis Status: Declining  Malnutrition Diagnosis: Moderate malnutrition related to acute disease or injury  As Evidenced by: pt's reported intake likely meeting <75% of estimated needs for at least 7 days, previous 5% weight loss in 1 month, LOS 42.  I agree with the dietitian's malnutrition diagnosis.      Assessment/Plan   Ms. Yimi Bowles is a 34F with a PMHx sig for stage D HFrEF (PPCM) s/p ICD s/p CardioMEMs, hypothyroidism 2/2 thyroidectomy, obesity s/p gastric bypass (2017), and SLE who is s/p OHT (3/31/2022) with a post-OHT course complicated by RIJ/RUE DVTs, leukopenia, left groin seroma, and asymptomatic 2R ACR (11/2022) treated with steroids, s/p total hysterectomy (2023), Flu A (1/2024).     Originally presented to [facility] ED on 2/15/24 with complaints of N/V x 3 days and inability to keep medications down. Found to have acute systolic heart failure with primary graft failure and cardiogenic shock. Treated for suspected acute cellular vs. acute antibody mediated rejection; however, multiple cardiac biopsies negative for pathology indicating rejection or other causes of graft failure. Course has  been complicated by multiple MCS devices for refractory cardiogenic shock (right femoral IABP: 2/18/24 - 3/1/24, Left femoral VA ECMO: 2/19/24 - 2/29/24, Right axillary impella 5.5: 3/1/24 - remains in place, 2nd right femoral VA ECMO: 3/27/24 - remains in place), ARF requiring RRT, acute liver injury, severe hemolysis 2/2 to impella malposition, mild CMV viremia, bilateral provoked IJ DVTs, multiple episodes of epistaxis & oral bleeding, coagulopathies requiring ongoing blood product transfusions, HCAP, ARDS & acute mental status change on 4/18/24 requiring intubation for airway protection..       Transferred to [facility] on 3/27/24 following right femoral VA ECMO placement due to worsening cardiogenic shock and multi-organ failure despite Impella 5.5 support and multiple inotropes. Recently status 1 for duel organ heart-kidney transplant but status decreased to 7 on 4/17/24 due to chest x-ray concerning for ARDs 2/2 to pulmonary overload vs. Infectious process. Later that day she developed acute oral bleeding and then acute mental status change requiring intubation for airway protection. At that time she was delisted from [facility] for organ transplantation.      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 on 3/1/24, repeat CMV levels remain negative since 4/7/24)  Toxo: -/-   Hep C: -/-     Rejection/Prophylaxis (transplants):  - Steroids: Continue 10mg PO prednisone daily (risk for adrenal insufficiency if stopped)  - Tacrolimus: stopped on 4/9/24 due to infection   - Myfortic  acid: stopped on 4/9/24 due to infection  - Antifungals: nystatin 500,000units Q6  - Antivirals: valcyte 450mg Q48hrs   - Anti PCP & Toxoplasmosis: bactrim 200-40mg daily      Last cardiac biopsy: 2/16/24 with ACR grade 1R and no AMR (extremely low C4d+ detected), 2/20/24 negative for ACR and AMR  Last HLA: 4/16/24 negative for class 1 or 2 antibodies   Last RHC: closing numbers on 3/16/24 /86(97) RA 20, PAP 40/33(37), C.O./C.I. 5.5/2.8, PVR 88, SVR 1115   Last LHC: 2/18/24 negative for CAV and CAD   Last TTE/BOB: 4/16/24 shows severe LVEF w/ global hypokinesis, mild RV dysfunction, mild-mod AR, mod MR, mild TR and impella angled posteriorly   Osteopenia/osteoporosis prophylaxis: Vitamin D3 currently held. Continue calcium supplements  Peptic/gastric ulcer prophylaxis: Pantoprazole 40mg daily  CAV Prophylaxis: Holding Aspirin 81mg due to continued thrombocytopenia. Holding rosuvastatin due to transaminases.      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 Broad differential including SLE flair, giant cell vasculitis, CAV/CAD, viral cardiomyopathy, sarcoidosis, amyloidosis and allograft rejection amongst many others. 2xallograft biopsies on 2/16 & 2/20 remain negative for any significant pathology. Notably, both biopsies taken after rejection therapies implemented which may have reduced areas of graft damage.    - DSAs remain negative; however, patient may have non-HLA antibodies present. 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   - Negative CAV & CAD via LHC on 2/18/24   - Completed methylpred steroid pulse w/ 1g Q24 x 3 days (2/16-2/18) and prednisone taper   - Thymoglobulin doses: 2/18 & 2/19   - IVIG + PLEX Session: 2/18, 2/20, 3/7, 3/11 and 3/13    - Right femoral VA ECMO flowing 3.5L w/ FIO2 80% and sweep 6  - Right axillary impella for LV venting remains in place and set P4 w/ flows of 1.5L  - Impella remains poorly positioned and appears to be in contact with mitral valve leaflets. LDH  continues to rise indicating worsening hemolysis. On going transfusion requirements.   - LFTs remain elevated with uptrending bilirubin, jaundice and scleral icterus   - Originally failed swallow eval on 4/10/24. ENT placed dobhoff w/ tubefeeds for nutritional support; however, patient requested removal on 4/15. Passed formal swallow eval on 4/15. Unfortunately, she has had poor PO intake and continues to have poor nutritional support. Now placed on TPN.      - Intermittently delirious and anxious   - CT scan on 4/9/24 shows bilateral pulmonary infiltrates throughout lung fields, L>R, confirming HCAP; however, no organism identified.   - Repeat CT scan on 4/16 continues to show bilateral infiltrates with chest x-ray on 4/17 showing worsening lung fields concerning for ARDs 2/2 to TACO vs. hypervolemia vs. ongoing infectious process.   - Reintubated on 4/17/24 due to acute mental status change and for airway protection. Remains on APRV for ventilatory support due to hypoxic and hypercarbic respiratory failure   - Unclear cause of AMS change as CTH negative for intracranial processes. Likely metabolic vs. Infectious as exam was non-focal.  - Concern for possible aspergillus infection & HHSV6 infection with recent BAL cultures       Transplant Status:  - Delisted from [facility] on 4/17/24 due to acute mental status change, reintubation and respiratory failure.         Recommendations:  - Agree with continued support and limited escalation of care  - Plan to have family meeting with Ms. Yimi Onofre's family today @ [phone]      Continue excellent care per [facility] team.      Patient was examined and discussed with [facility] Trans[facility] Cardiology attending physician, Dr. Sherman     Heart Transplant Team:   HERMEL DELOR Secure Chat  [phone] during day shift  [phone] during night shift     Keith Jain, APRN-CNP

## 2024-04-18 NOTE — PROGRESS NOTES
Charla Bowles is a 34 y.o. female on day 62 of admission presenting with Cardiogenic shock (Multi).    Subjective   Interval History:   Patient developed AMS and was intubated. Bronchoscopy performed and obtain BAL samples   Patient remains with leukocytosis but slightly decrease to 19.7       Review of Systems   Unable to perform ROS: Intubated       Objective   Range of Vitals (last 24 hours)  Heart Rate:  []   Temp:  [35.6 °C (96.1 °F)-37.1 °C (98.8 °F)]   Resp:  [12-55]   BP: ()/(56-95)   SpO2:  [69 %-100 %]   Daily Weight  04/06/24 : 84 kg (185 lb 3 oz)    Body mass index is 35.01 kg/m².    Physical Exam  Constitutional:       General: She is not in acute distress.     Appearance: She is ill-appearing.   HENT:      Head: Normocephalic.   Cardiovascular:      Pulses: Normal pulses.      Heart sounds: No murmur heard.  Pulmonary:      Comments: Intubated with coarse breath sound symmetric anteriorly  Abdominal:      General: Abdomen is flat. Bowel sounds are normal. There is no distension.      Palpations: Abdomen is soft.      Tenderness: There is no abdominal tenderness.   Musculoskeletal:         General: No swelling or tenderness.   Skin:     General: Skin is warm and dry.      Coloration: Skin is not jaundiced.      Comments: R fem VA ECMO, R axillary impella   Neurological:      General: No focal deficit present.      Mental Status: She is oriented to person, place, and time.         Relevant Results  Labs  Results from last 72 hours   Lab Units 04/17/24  1433 04/17/24  1042 04/17/24  0847 04/17/24  0325 04/16/24  1001 04/16/24  0034 04/15/24  1356 04/15/24  0739   WBC AUTO x10*3/uL 19.7* 20.9* 21.5* 21.7*   < > 23.4*   < > 23.1*  23.1*   HEMOGLOBIN g/dL 8.0* 8.7* 8.0* 6.6*   < > 6.8*   < > 8.2*  8.2*   HEMATOCRIT % 21.5* 23.6* 22.3* 17.7*   < > 18.8*   < > 24.6*  24.6*   PLATELETS AUTO x10*3/uL 73* 74* 94* 51*   < > 65*   < > 81*  81*   LYMPHO PCT MAN %  --   --   --  3.1  --  7.2   --  2.6   MONO PCT MAN %  --   --   --  11.0  --  3.6  --  6.9   EOSINO PCT MAN %  --   --   --  0.0  --  0.0  --  0.0    < > = values in this interval not displayed.     Results from last 72 hours   Lab Units 04/17/24  1042 04/17/24 0325 04/16/24  0034   SODIUM mmol/L 137 134* 133*   POTASSIUM mmol/L 4.4 4.3 4.4   CHLORIDE mmol/L 101 98 98   CO2 mmol/L 23 19* 27   BUN mg/dL 21 23 19   CREATININE mg/dL 0.64 0.68 0.61   GLUCOSE mg/dL 101* 147* 101*   CALCIUM mg/dL 8.1* 8.4* 8.5*   ANION GAP mmol/L 17 21* 12   EGFR mL/min/1.73m*2 >90 >90 >90   PHOSPHORUS mg/dL 2.1* 2.3* 1.8*     Results from last 72 hours   Lab Units 04/17/24  1042 04/17/24 0325 04/16/24  0034 04/15/24  1356 04/15/24  0737   ALK PHOS U/L  --  185*  --   --  308*   BILIRUBIN TOTAL mg/dL  --  3.2*  --   --  3.5*   BILIRUBIN DIRECT mg/dL  --  0.5*  --   --  0.6*   PROTEIN TOTAL g/dL  --  6.3*  --   --  7.3   ALT U/L  --  39  --   --  39   AST U/L  --  276*  --   --  196*   ALBUMIN g/dL 4.3 4.4 4.4   < > 4.8    < > = values in this interval not displayed.     Estimated Creatinine Clearance: 123 mL/min (by C-G formula based on SCr of 0.64 mg/dL).  C-Reactive Protein   Date Value Ref Range Status   03/27/2024 4.27 (H) <1.00 mg/dL Final   02/15/2024 6.06 (H) <1.00 mg/dL Final     CRP   Date Value Ref Range Status   01/18/2023 0.68 mg/dL Final     Comment:     REF VALUE  < 1.00       Microbiology  Susceptibility data from last 14 days.  Collected Specimen Info Organism Ampicillin Ciprofloxacin Daptomycin Levofloxacin Linezolid Nitrofurantoin Penicillin Trimethoprim/Sulfamethoxazole Vancomycin   04/09/24 Urine from Straight Catheter Enterococcus faecium R R R R S S R R R     4/17 BAL Pending  4/13 Respiratory secretion (labeled as BAL):  Aspergillus galactomannan 6.104, PJP PCR negative, Legionella, Adenovirus, CMV, M pneumoniae and C. 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 She presented to the ED with N/V in 2/2024 and was found with graft failure and cardiogenic shock. Treated for acute cellular versus antibody-mediated rejection with pulse steroids, IVIG/plasmapheresis x5, and 2 doses of thymoglobulin in 2-3/2024, but repeated biopsies were negative for significant rejection. Over this time she has had refractory cardiogenic shock requiring impella and ECMO support with course c/b ARF requiring RRT, ALI, hemolysis in setting of impella positioning, bilateral internal jugular DVTs, and coagulopathies.        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       ID were consulted for hypotension, leukocytosis, and AMS. Localizing symptoms for us have largely been epigastric abdominal pain, tachypnea, dry cough, and mild AMS.      ID problems  # Suspected VAP      -4/13 Positive HHV-6 and aspergillus galactomannan in respiratory secretion. Raise the concern for possible viral/fungal infections in settings of immunocompromised host  #Positive beta-D-glucan, probable false positive from IVIG  #Heart failure s/p prior OHT      -Now awaiting re-listing for new OHT  # 4/13/24 New leukocytosis           Work-up most suggestive for VAP. Agree with stopping micafungin, and subsequently vancomycin given negative cultures and MRSA nares.           False positive BDGs are unfortunately  common and may be due to IVIG (last given 3/18, can last for several weeks), dialysis filters, albumin products, and blood products, among other causes. She has had exposure to all of these recently. After further review this evening, given her exposures to common causes of BDG false positives, CT chest findings, and heavily immunocompromised state with likely substantial future immunosuppression, it may make most sense to obtain a PJP PCR of the sputum, as repeating a BDG in a week may still be falsely positive anyways for the reasons above.     04/12/2024 Update:       Discussed case with patient and her mother and the primary team.  Clinical suspicion for PJP is low although patient is at high risk.  Also clinical suspicion lower given propensity for IVIG and other agents (see above) to cause false positive beta D glucan testing.  Ideally would do bronchoscopy to obtain deep respiratory sample for BAL viral PCR panel including pneumocystis.  Deep samples could also be examined by sputum/BAL cytology but this has been largely replaced by PCR testing on adequate sample.       Could do nasotracheal aspirate and send for PJP PCR designating the sample tracheal aspirate/fluid.  Would also send for BAL viral PCR panel to rule out other atypical pathogens (suspicion low).       Would not treat empirically for PJP at this time given paucity of evidence.  Also concerned that treatment for PCP even empiric would mandate 3-week induction followed by maintenance therapy.  Would not initiate empiric therapy unless committed to completing induction therapy followed by long-term maintenance therapy.  Also would need to consider risk for PJP exacerbation when on high-dose immunosuppression posttransplant.  Might be forced to treatment doses at that time which might pose additional risks in the peritransplant setting.          At this time I think we can try to obtain deep respiratory sample with suctioning or via expectoration  or via  hypertonic induction (and at the present time avoid traumatic nasotracheal aspiration given history of epistaxis and bronchoscopy).       Also empiric treatment in this setting is difficult and would need to consider whether or not other broad-spectrum antifungal should be included.  Thus without deeper specimen or additional microbiologic or molecular diagnostic testing, would not empirically treat for PJP or other fungi (micafungin or Amphotericin)     4/14/2024 Update:       Clinically stable.  However WBC count has risen to 22,000 over 48 hours.  No new localizing signs or symptoms but will need repeat blood cultures, stool C. difficile PCR if patient has diarrhea.  Will also need updated lines.  No antibiotic change at this time     4/15/2024 update:       Discussed with primary team.  Multidisciplinary team meeting today that recommended discontinuing antibiotics as had completed several courses of antibiotics without clear infectious target.  That said patient has moderate leukocytosis which may derived from several possibilities including all lines, cardiac devices, pneumonia, for example.  Not believe we need to target enterococcus and bladder.  Not making urine and likely to have residual urine, and mucus that may be secondarily colonized.  No lower pelvic or /urethral complaints at this time     4/17/2024 update:  Acute decompensated with AMS and requires intubation. Bronchoscopy did not notice bleeding as initially concerning for DAH. However, patient has new infectious work up positive for HHV-6 and galactomannan in respiratory specimen. Raising the concern for possible infection. As patient is severely sick, will favor broad coverage and send BAL samples for further testing.    Recommendations:  - Recommend to start vancomycin for goal trough 15-20 or -600. Dosing and monitoring by Pharmacist  - Recommend to start Meropenem 1 gr q8hr IV (or equivalent dose adjusted for CVVH).  -  Recommend to start voriconazole 6mg/kg for 2 doses then 4mg/kg q 12hr (or adjusted dose for CVVH).  - Recommend to obtain BAL for culture, viral panel, HSV1/2 PCR , PJP PCR, Aspergillus galactomannan  - Continue prophylaxis: Bactrim, valganciclovir 450 q48h  - Recommend to obtain HHV-6 PCR blood    ID will continue to follow up  Plan discussed with Dr Fito Donovan MD  PGY-5 ID Fellow  Team A pager [phone]

## 2024-04-18 NOTE — PROGRESS NOTES
CTICU Progress Note  Charla Bowles/[id number]    Admit Date: 2/15/2024  Hospital Length of Stay: 63   ICU Length of Stay: 21d 12h   CT SURGEON: Dr. Witt    SUBJECTIVE:   Hgb slow downtrend  Dialysis stopped due to inability to flow through ecmo circuit  Decreased to status 7    MEDICATIONS  Infusions:  Adult Clinimix Parenteral Nutrition, Last Rate: 30 mL/hr (04/18/24 0800)  dexmedeTOMIDine, Last Rate: Stopped (04/17/24 1615)  [Held by provider] heparin, Last Rate: Stopped (04/17/24 1423)  [Held by provider] heparin Impella Purge 25 units/mL in 500 mL D5W, Last Rate: Stopped (04/17/24 1354)  lactated Ringer's, Last Rate: 5 mL/hr (04/18/24 0800)  PrismaSol 4/2.5, Last Rate: 2,400 mL/hr (04/13/24 1700)  propofol, Last Rate: 50 mcg/kg/min (04/18/24 0800)  sodium bicarbonate infusion Impella Purge 25 mEq/1000 mL D5W, Last Rate: 10 mL/hr (04/18/24 0800)      Scheduled:  calcium carbonate-vitamin D3, 1 tablet, Daily  cyanocobalamin, 500 mcg, Daily  darbepoetin floyd, 100 mcg, q14 days  fat emulsion fish oil/plant based, 250 mL, Daily Lipids  ferrous sulfate (325 mg ferrous sulfate), 65 mg of iron, Daily with breakfast  folic acid, 1 mg, Daily  insulin lispro, 0-15 Units, TID with meals  levothyroxine, 90 mcg, q24h TRICIA  lidocaine, 1 patch, Daily  melatonin, 10 mg, Daily  meropenem, 1 g, q12h  multivitamin with minerals, 1 tablet, Daily  mupirocin, 1 Application, BID  nystatin, 5 mL, q6h  pantoprazole, 40 mg, Daily  polyethylene glycol, 17 g, Daily  predniSONE, 10 mg, Daily  QUEtiapine, 50 mg, BID  [Held by provider] rosuvastatin, 10 mg, Nightly  sennosides-docusate sodium, 2 tablet, BID  sulfamethoxazole-trimethoprim, 80 mg of trimethoprim, Daily  valGANciclovir, 450 mg, q48h  vancomycin, 750 mg, q12h  voriconazole, 4 mg/kg, q12h      PRN:  acetaminophen, 650 mg, q6h PRN  alteplase, 2 mg, PRN  bisacodyl, 10 mg, Daily PRN  calcium gluconate, 1 g, q6h PRN  calcium gluconate, 2 g, q6h PRN  dextrose, 25 g, q15 min  "PRN   Or  glucagon, 1 mg, q15 min PRN  hydrALAZINE, 5 mg, q4h PRN  hydrOXYzine HCL, 25 mg, q6h PRN  ipratropium-albuteroL, 3 mL, q6h PRN  artificial tears, 2 drop, PRN  magnesium sulfate, 2 g, q6h PRN  magnesium sulfate, 4 g, q6h PRN  microfibrllar collagen, , PRN  mupirocin, , BID PRN  naloxone, 0.2 mg, q5 min PRN  ondansetron, 4 mg, q4h PRN  oxyCODONE, 5 mg, q6h PRN  oxygen, , Continuous PRN - O2/gases  sodium chloride, 1 spray, 4x daily PRN  vancomycin, , Daily PRN        PHYSICAL EXAM:   Visit Vitals  BP (!) 103/93 Comment: post: 95/82   Pulse 95   Temp 36.2 °C (97.2 °F) (Temporal)   Resp 12   Ht 1.549 m (5' 0.98\")   Wt 84 kg (185 lb 3 oz)   SpO2 100%   BMI 35.01 kg/m²   OB Status Hysterectomy   Smoking Status Never   BSA 1.9 m²     Wt Readings from Last 5 Encounters:   04/06/24 84 kg (185 lb 3 oz)   12/07/23 92.1 kg (203 lb)   12/01/23 93 kg (205 lb)   11/29/23 92.9 kg (204 lb 12.8 oz)   11/09/23 91.3 kg (201 lb 3.2 oz)     INTAKE/OUTPUT:  I/O last 3 completed shifts:  In: 2759.6 (32.9 mL/kg) [I.V.:1148.5 (13.7 mL/kg); Blood:950; IV Piggyback:300]  Out: 991 (11.8 mL/kg) [Emesis/NG output:200; Other:791]  Weight: 84 kg        LDA:  ECMO Cannulation Peripheral Right;Femoral artery;Femoral vein (Active)   Placement Date/Time: 03/27/24 1700   ECMO Type: Peripheral  Cannulation Site: Right;Femoral artery;Femoral vein  Line Securement: Line secured in 2 locations;Securement device;Sutures   Number of days: 10       Arterial Line 03/27/24 Right Radial (Active)   Placement Date/Time: 03/27/24 1545   Orientation: Right  Location: Radial   Number of days: 10       Ventricular Assist Device Impella 5.5 (Active)   Placement Date/Time: 03/01/24 1956   Placed by: Dr Viramontes  Hand Hygiene Completed: Yes  Site Location: Axilla right  VAD Type: Left ventricular assist device  VAD Brand: Impella 5.5   Number of days: 36       Hemodialysis Cath 04/06/24 Triple lumen Right Non-tunneled catheter Jugular (Active)   Placement " Date/Time: 04/06/24 1500   Hand Hygiene Completed: Yes  Site Prep: Usual sterile procedure followed  Site Prep Agent has Completely Dried Before Insertion: Yes  All 5 Sterile Barriers Used (Gloves, Gown, Cap, Mask, Large Sterile Drape): Yes ...   Number of days: 0     Physical Exam:   - CONSTITUTION: Critically ill on MCS  - NEUROLOGIC: Sedated RASS -3. CAM positive today, Moves all of extremities to command.. following in all 4 extremities. Physically deconditioned  - CARDIOVASCULAR: Sinus, R fem VA ECMO, no bleeding at site. R axillary impella, no bleeding at site. No s/s infection at either site. +distal signals in bilateral lower extremities  - RESPIRATORY: Diminished bilateral lung sounds, symmetric chest expansion  - GI: Abdomen soft, non tender, non distended, +bowel sounds.  Diarrhea, FMS in place  - : Anuric, on CVVH   - EXTREMITIES: Warm and dry, no peripheral edema  - SKIN: Left femoral skin breakdown with dressing in place  - PSYCHIATRIC: Calm     Images: CXR c/f  pulmonary edema persisting but improving    Invasive Hemodynamics:    Most Recent Range Past 24hrs   BP (Art) 97/85 Arterial Line BP 1  Min: 87/71  Max: 120/110   MAP(Art) 89 mmHg Arterial Line MAP 1 (mmHg)   Min: 78 mmHg  Max: 114 mmHg   RA/CVP   No data recorded   PA 41/34 No data recorded   PA(mean) 37 mmHg No data recorded   CO 5.5 L/min No data recorded   CI 2.8 L/min/m2 No data recorded   Mixed Venous 63 % SVO2 (%)  Min: 44.9 %  Max: 71.1 %   SVR  1115 (dyne*sec)/cm5 No data recorded     ECMO:     Most Recent Range Past 24hrs   Flow 3.85 Patient Flow (L/min)  Min: 2.56  Max: 4.8   Speed 3400 Circuit Flow (RPM)  Min: 3170  Max: 4106   Sweep 5 Sweep Gas Flow Rate (L/min)  Min: 5  Max: 6      Impella:      Most Recent Range Past 24hrs   Performance Level 4 P Level  Min: 2   Min taken time: 04/17/24 1630  Max: 4   Max taken time: 04/18/24 0700   Flow (L/min) 1.5 Flow (L/min)  Min: 0.5   Min taken time: 04/17/24 1630  Max: 1.6   Max taken  time: 04/18/24 0600   Motor Current 214/147 Motor Current  Min: 114/81   Min taken time: 04/17/24 1500  Max: 230/151   Max taken time: 04/17/24 1900   Placement Signal Yes  Placement OK could not be evaluated. This SmartLink does not work with rows of the type: Custom List   Purge (mmHg) 492 Purge Pressure (mmHg)  Min: 389   Min taken time: 04/17/24 1100  Max: 638   Max taken time: 04/17/24 1400   Purge rate (mL/hr) 10.8 Purge Rate (mL/hr)  Min: 10.1   Min taken time: 04/17/24 2000  Max: 11.3   Max taken time: 04/18/24 0400        Daily Risk Screen:  Dialysis/Hemapheresis    Assessment/Plan   34F with a PMHx sig for stage D HFrEF (PPCM) s/p ICD s/p CardioMEMs, hypothyroidism 2/2 thyroidectomy, obesity s/p gastric bypass (2017), and SLE who is s/p OHT (3/31/2022) with a post-OHT course complicated by RIJ/RUE DVTs, leukopenia, left groin seroma, and asymptomatic 2R ACR (11/2022) treated with steroids, s/p total hysterectomy (2023), Flu A (1/2024).     She presented to [facility] ED on 2/15/24 with complaints of N/V x 3 days and inability to keep medications down. Found to have acute systolic heart failure with primary graft failure and cardiogenic shock. Treated for suspected acute cellular vs. acute antibody mediated rejection; however, multiple cardiac biopsies negative for signs of rejection or other causes of graft failure. Her course has been complicated by multiple MCS devices for refractory cardiogenic shock (right femoral IABP: 2/18/24 - 3/1/24, Left femoral VA ECMO: 2/19/24 - 2/29/24, Right axillary impella 5.5: 3/1/24 - remains in place, 2nd right femoral VA ECMO: 3/27/24 - remains in place), ARF requiring RRT, acute liver injury, intubation due to volume overload in setting of pulmonary edema, severe hemolysis 2/2 to impella malposition, mild CMV viremia, bilateral provoked IJ DVTs, multiple episodes of epistaxis & coagulopathies requiring ongoing blood product transfusions.        Charla was transferred to  CTICU on 3/27/24 following right femoral VA ECMO placement due to worsening cardiogenic shock and multi-organ failure despite Impella 5.5 support and multiple inotropes. She was listed Status 1 for heart/kidney on 4/2, however was deactivated (status 7) due to c/f sepsis. Relisted Status 1 4/16. She remains critically ill in the CTICU on MCS with ECMO and an Impella as well as CVVH.       NEURO:  She vacillates between being neuro intact with intermittent delirium and anxiety though is much improved over last few days. Insomnia supported with multimodals and REST protocol.  CAM negative this AM.  Sitting at side of bed and standing with PT- max assist to get into position but then min assist to maintain. Increased anxiety. Precedex nightly for sleep and intermittently throughout day with anxiety attacks. Additional AM dose of seroquel this AM with agitation.  -->  - Serial neuro and pain assessments  - PRN tylenol  - PRN dilaudid  - Continue lidoderm patch for back pain  - PRN oxy 5mg Q6   - Continue melatonin 10 mg HS   - Stop seroquel  - PT/OT Consult; mobilize as able  - CAM ICU score qshift. Sleep/wake cycle hygiene  - REST protocol (CXR and labs after 6am)      ENT: Multiple episodes of epistaxis with interventions by ENT. Most recently in OR 3/27 with L nare packed. Packing removed 4/1. -->  - appreciate ENT recs  - PRN ocean spray and mupiricin for dry nasal passages  - PRN afrin      Cardiac: Patient has a history of heart transplant in March of 2022 with primary graft dysfunction treated for acute cellular and antibody rejection without improvement with negative biopsy with multiple MCS devices for refractory cardiogenic shock (right femoral IABP: 2/18/24 - 3/1/24; Left femoral VA ECMO: 2/19/24 - 2/29/24; Right axillary impella 5.5: 3/1/24 - ; 2nd right femoral VA ECMO: 3/27/24 - ). Elevated LDH and difficulty with flows despite multiple attempts at repositioning Impella previously.   Current ECMO settings  ~3.7L with FiO2 95% and sweep 4; Impella 5.5 P4 with flows 1.5 LPM. LDH increasing. Remains sinus tachy and normotensive. -->  - Maintain goal MAP 70-90  - Continue full BiV support with ECMO  - Continue Impella at P4  - Holding rosuvastatin with mild uptrending in LFT  - Trend daily LDH   - Now status 7  - Hold home amlodipine     Vascular: 3/27 arterial duplex with proximal SFA occlusion with collateral flow. C/f LLE ischemia resolved. Feet warm with intact motor exam. -->     PULM: She has been intubated multiple times throughout hospital course for procedures. Acute respiratory failure persisting due to acute pulmonary edema and CT chest 4/9 with severe multifocal PNA. Cxr improving with some cardiogenic pulmonary edema. Gas exchange augmented by VA ECMO,4/17 intubated for acute hypoxic respiratory failure and distress. Currently on APRV PH 30, 80%. CXR with pulmonary edema but much improved from yesterday. Sweep is at 5  -->  - CXR daily  - Wean FIO2 first to 60% then decrease PH  - Adjust sweep for pH 7.3 - 7.5  - Maintain SPO2 > 92%     GI: History of gastric bypass and MMF colitis. Shock liver originally resolved; most recently elevated r/t likely congestive hepatopathy that is improving on ECMO. Abdominal pain improved with reduced myfortic dosing. Previous c/f GI bleed, likely blood from epistaxis; no current evidence of GI bleeding. H pylori negative, fecal calprotectin (elevated at 380), fecal lactoferrin (Positive 03/23/24). Poor nutritional intake leading to vitamin K deficiency and elevated INR s/p Dobhoff placement by ENT 4/8 under fiberoptic visualization. Despite education on benefits of maintaining, NG tube removed 4/15 per patient request. Very poor nutritional intake. Abdominal pain this morning, mild tenderness in left lower quadrant. Dilated loop c/f ileus on KUB. Transiently elevated lactate. -->  - Continue calcitriol 0.5mg daily, multivitamin, calcium carbonate 1,250mg BID  - Continue PPI  daily  - Patient with poor PO. On TPN hold lipids since on prop.   - Jaundiced Increasing LFTs. Follow up ammonia level with increasing agitation.   - daily miralax     :  No history, baseline Cr 1.2-1.3. HIRAM originally on CVVH then with renal recovery but then again worsened and now dialysis dependent and failed diuretic challenges. Euvolemic. Mild hypophosphatemia.  -->  - RFP as clinically indicated, replete electrolytes per CTICU protocol.   - Continue CVVH with goal negative 500 cc  - Nephrology Following     ENDO: History of hypothyroidism and prediabetes. TSH elevated originally to malabsorption then with dosing adjusted by endo; TSH (3/17): 20.74, T4 1.11, T3: 1.4, improved 4/1; TSH 10.13, T4 0.70. 4/8: TSH 19.48, T3 0.8, T4 0.68. Steroid induced hyperglycemia acceptable glycemic control on SSI. -->  - Maintain BG <180 with hypoglycemia protocol  - Continue SSI #1 AC/HS   - Continue Synthroid 75 mcg IV daily with concern for malabsorption  - Recheck TFT's 4/23 (ordered)   - Appreciate Endocrine Consult      HEME: History of iron deficiency anemia and remote DVT; again with acute DVT LIJ and SVT left cephalic vein and right cephalic vein. Acute blood loss anemia with repeated transfusions with significant hemolysis but no evidence of active bleeding; last received RBC 4/5. Stable thrombocytopenia persisting; last PF4 negative 3/30. No recent transfusion requirement. Coagulopathy r/t likely poor nutrition resolved with vit K x 3 doses (last 4/10).   -->  - Continue ferrous sulfate daily  - Avoid unnecessarily transfusing,   - Hold heparin due to thrombocytopenia  - Sodium bicarb through heparin purge  - Trend coags daily  - SCDs for DVT prophylaxis  - Aranesp every 2 weeks  - Last type and screen: 4/16     Rejection/prophylaxis: S/p heart transplant 3/31/2022. Induction basiliximab. Donor HCV -, toxo -/-, CMV -/+. Mild ACR with +CD4 and negative HLAs however clinical presentation concern for acute cellular  vs AMR rejection/stuttering rejection completed courses of thymo/PLEX/IVIG without clinical improvement.  With consideration to progressive graft failure and concern for infection limiting re-transplant at this time, we are holding tacro and myfortic (4/9 - ).  - Steroids: Continue PO prednisone 10 mg daily  - Hold Tacrolimus: 4.0 mg AM/3.5 mg PM w/ daily levels drawn @ 0600  - Hold Tacrolimus goal troughs: 8-10  - Hold Myfortic acid: 360mg BID (Not starting MMF d/t hx of colitis)  - Antifungals: nystatin 500,000units Q6  - Antivirals: valcyte 450mg Q48hrs  - Anti PCP & Toxoplasmosis: continue SS Bactrim with c/f PNA     ID: C/f previous yeast infection. BC 3/27 NGTD final. MRSA screen negative 3/28. Episode of hypotension on 4/1, pan cultured and empiric Vanco/Zosyn started.  Patient was shivering 4/3, concern for risk of infection. Blood cultures sent 4/3, NGTD. Zosyn (4/1- 4/7) then broadened to Susan (4/7 - 4/15) and vanco (4/6-4/15) and natalie started (4/7 - 4/12). CT chest 4/9 demonstrates severe multifocal PNA. UA culture with enterococcus but difficult to define as infection given anuric state.  Beta-D glucan mildly elevated 195 thought to be 2/2 recent IVIG though could also be indicative of PJP. Persistent leukocytosis without clear source if infection. Cultures negative.  -->  - Trend temp q4h  - Holding antibiotics for now  - Follow up culture results  - Re-engage ID for positive aspergillosis.   -Vanc/susan/vori started 4/17 per ID  linezolid for VRE with ID--> do not treat per ID. Recommending repeat UA but no urine to send.  - PJP negative     Skin: Left groin wound from previous ECMO with wound consulted. Wound cx with NG 3/15.   - Preventative Mepilex dressings in place on sacrum and heels  - Change preventative Mepilex weekly or more frequently as indicated (when moist/soiled)   - Every shift skin assessment per nursing and weekly ICU skin rounds  - Moisture barrier to be applied with christopher care  -  Active skin problems addressed with nursing on daily rounds  - wound recs from note 3/15 ordered        Family meeting today to discus GOC    Proph:  SCDs  PPI     G: Lines  RIJ Trialysis - 4/6  R radial maxwell 4/11  PIV x2    Restraints: Not indicated    Code status: Full Code    I personally spent 45 minutes of critical care time directly and personally managing the patient exclusive of separately billable procedures.     A,B,C,D,E,F,G: reviewed.    A&P reviewed on multidisciplinary critical care rounds. Plan for multidisciplinary discussion today to discuss trajectory.      Dispo: CTICU care for now.    CTICU TEAM PHONE [phone]

## 2024-04-19 NOTE — PROGRESS NOTES
CTICU Progress Note  Charla Bowles/[id number]    Admit Date: 2/15/2024  Hospital Length of Stay: 64   ICU Length of Stay: 22d 12h   CT SURGEON: Dr. Witt    SUBJECTIVE:   Hgb slow downtrend  Dialysis stopped due to inability to flow through ecmo circuit  Decreased to status 7    MEDICATIONS  Infusions:  Adult Clinimix Parenteral Nutrition, Last Rate: 30 mL/hr (04/19/24 0700)  dexmedeTOMIDine, Last Rate: Stopped (04/17/24 1615)  lactated Ringer's, Last Rate: 5 mL/hr (04/19/24 0700)  PrismaSol 4/2.5, Last Rate: 2,400 mL/hr (04/13/24 1700)  propofol, Last Rate: 30 mcg/kg/min (04/19/24 0700)  sodium bicarbonate infusion Impella Purge 25 mEq/1000 mL D5W, Last Rate: 10 mL/hr (04/19/24 0700)      Scheduled:  calcium carbonate-vitamin D3, 1 tablet, Daily  cyanocobalamin, 500 mcg, Daily  darbepoetin floyd, 100 mcg, q14 days  ferrous sulfate (325 mg ferrous sulfate), 65 mg of iron, Daily with breakfast  folic acid, 1 mg, Daily  insulin lispro, 0-15 Units, TID with meals  levothyroxine, 90 mcg, q24h TRICIA  lidocaine, 1 patch, Daily  melatonin, 10 mg, Daily  meropenem, 1 g, q12h  multivitamin with minerals, 1 tablet, Daily  mupirocin, 1 Application, BID  nystatin, 5 mL, q6h  pantoprazole, 40 mg, Daily  polyethylene glycol, 17 g, Daily  predniSONE, 10 mg, Daily  sennosides-docusate sodium, 2 tablet, BID  sulfamethoxazole-trimethoprim, 80 mg of trimethoprim, Daily  valGANciclovir, 450 mg, q48h  vancomycin, 750 mg, q12h  voriconazole, 4 mg/kg, q12h      PRN:  acetaminophen, 650 mg, q6h PRN  alteplase, 2 mg, PRN  bisacodyl, 10 mg, Daily PRN  calcium gluconate, 1 g, q6h PRN  calcium gluconate, 2 g, q6h PRN  dextrose, 25 g, q15 min PRN   Or  glucagon, 1 mg, q15 min PRN  hydrALAZINE, 5 mg, q4h PRN  HYDROmorphone, 0.2 mg, q3h PRN  ipratropium-albuteroL, 3 mL, q6h PRN  artificial tears, 2 drop, PRN  magnesium sulfate, 2 g, q6h PRN  magnesium sulfate, 4 g, q6h PRN  microfibrllar collagen, , PRN  mupirocin, , BID PRN  naloxone, 0.2  "mg, q5 min PRN  ondansetron, 4 mg, q4h PRN  oxyCODONE, 5 mg, q6h PRN  oxygen, , Continuous PRN - O2/gases  sodium chloride, 1 spray, 4x daily PRN  vancomycin, , Daily PRN        PHYSICAL EXAM:   Visit Vitals  BP (!) 103/93 Comment: post: 95/82   Pulse 107   Temp 36.4 °C (97.5 °F) (Axillary)   Resp (!) 29   Ht 1.549 m (5' 0.98\")   Wt 78.5 kg (173 lb 1 oz)   SpO2 97%   BMI 32.72 kg/m²   OB Status Hysterectomy   Smoking Status Never   BSA 1.84 m²     Wt Readings from Last 5 Encounters:   04/19/24 78.5 kg (173 lb 1 oz)   12/07/23 92.1 kg (203 lb)   12/01/23 93 kg (205 lb)   11/29/23 92.9 kg (204 lb 12.8 oz)   11/09/23 91.3 kg (201 lb 3.2 oz)     INTAKE/OUTPUT:  I/O last 3 completed shifts:  In: 3292.2 (41.9 mL/kg) [I.V.:1308.1 (16.7 mL/kg); IV Piggyback:800]  Out: 3417 (43.5 mL/kg) [Other:3417]  Weight: 78.5 kg        LDA:  ECMO Cannulation Peripheral Right;Femoral artery;Femoral vein (Active)   Placement Date/Time: 03/27/24 1700   ECMO Type: Peripheral  Cannulation Site: Right;Femoral artery;Femoral vein  Line Securement: Line secured in 2 locations;Securement device;Sutures   Number of days: 10       Arterial Line 03/27/24 Right Radial (Active)   Placement Date/Time: 03/27/24 1545   Orientation: Right  Location: Radial   Number of days: 10       Ventricular Assist Device Impella 5.5 (Active)   Placement Date/Time: 03/01/24 1956   Placed by: Dr Viramontes  Hand Hygiene Completed: Yes  Site Location: Axilla right  VAD Type: Left ventricular assist device  VAD Brand: Impella 5.5   Number of days: 36       Hemodialysis Cath 04/06/24 Triple lumen Right Non-tunneled catheter Jugular (Active)   Placement Date/Time: 04/06/24 1500   Hand Hygiene Completed: Yes  Site Prep: Usual sterile procedure followed  Site Prep Agent has Completely Dried Before Insertion: Yes  All 5 Sterile Barriers Used (Gloves, Gown, Cap, Mask, Large Sterile Drape): Yes ...   Number of days: 0     Physical Exam:   - CONSTITUTION: Critically ill on MCS  - " NEUROLOGIC: Sedated RASS -3. CAM positive today, Moves all of extremities to command.. following in all 4 extremities. Physically deconditioned  - CARDIOVASCULAR: Sinus, R fem VA ECMO, no bleeding at site. R axillary impella, no bleeding at site. No s/s infection at either site. +distal signals in bilateral lower extremities  - RESPIRATORY: Diminished bilateral lung sounds, symmetric chest expansion  - GI: Abdomen soft, non tender, non distended, +bowel sounds.  Diarrhea, FMS in place  - : Anuric, on CVVH   - EXTREMITIES: Warm and dry, no peripheral edema  - SKIN: Left femoral skin breakdown with dressing in place  - PSYCHIATRIC: Calm     Images: CXR c/f  pulmonary edema persisting but improving    Invasive Hemodynamics:    Most Recent Range Past 24hrs   BP (Art) 83/77 Arterial Line BP 1  Min: 76/71  Max: 98/87   MAP(Art) 80 mmHg Arterial Line MAP 1 (mmHg)   Min: 73 mmHg  Max: 92 mmHg   RA/CVP   No data recorded   PA 41/34 No data recorded   PA(mean) 37 mmHg No data recorded   CO 5.5 L/min No data recorded   CI 2.8 L/min/m2 No data recorded   Mixed Venous 62.8 % SVO2 (%)  Min: 62.8 %  Max: 62.8 %   SVR  1115 (dyne*sec)/cm5 No data recorded     ECMO:     Most Recent Range Past 24hrs   Flow 3.1 Patient Flow (L/min)  Min: 2.97  Max: 3.88   Speed 3005 Circuit Flow (RPM)  Min: 3005  Max: 3400   Sweep 5 Sweep Gas Flow Rate (L/min)  Min: 5  Max: 5      Impella:      Most Recent Range Past 24hrs   Performance Level 3 P Level  Min: 3   Min taken time: 04/19/24 0700  Max: 4   Max taken time: 04/18/24 1200   Flow (L/min) 0.1 Flow (L/min)  Min: 0   Min taken time: 04/19/24 0400  Max: 1.9   Max taken time: 04/18/24 1100   Motor Current 163/114 Motor Current  Min: 160/115   Min taken time: 04/19/24 0600  Max: 228/150   Max taken time: 04/18/24 1100   Placement Signal Yes  Placement OK could not be evaluated. This SmartLink does not work with rows of the type: Custom List   Purge (mmHg) 400 Purge Pressure (mmHg)  Min: 400   Min  taken time: 04/19/24 0700  Max: 583   Max taken time: 04/18/24 0900   Purge rate (mL/hr) 11.3 Purge Rate (mL/hr)  Min: 10.7   Min taken time: 04/19/24 0200  Max: 11.6   Max taken time: 04/19/24 0500        Daily Risk Screen:  Dialysis/Hemapheresis    Assessment/Plan   34F with a PMHx sig for stage D HFrEF (PPCM) s/p ICD s/p CardioMEMs, hypothyroidism 2/2 thyroidectomy, obesity s/p gastric bypass (2017), and SLE who is s/p OHT (3/31/2022) with a post-OHT course complicated by RIJ/RUE DVTs, leukopenia, left groin seroma, and asymptomatic 2R ACR (11/2022) treated with steroids, s/p total hysterectomy (2023), Flu A (1/2024).     She presented to [facility] ED on 2/15/24 with complaints of N/V x 3 days and inability to keep medications down. Found to have acute systolic heart failure with primary graft failure and cardiogenic shock. Treated for suspected acute cellular vs. acute antibody mediated rejection; however, multiple cardiac biopsies negative for signs of rejection or other causes of graft failure. Her course has been complicated by multiple MCS devices for refractory cardiogenic shock (right femoral IABP: 2/18/24 - 3/1/24, Left femoral VA ECMO: 2/19/24 - 2/29/24, Right axillary impella 5.5: 3/1/24 - remains in place, 2nd right femoral VA ECMO: 3/27/24 - remains in place), ARF requiring RRT, acute liver injury, intubation due to volume overload in setting of pulmonary edema, severe hemolysis 2/2 to impella malposition, mild CMV viremia, bilateral provoked IJ DVTs, multiple episodes of epistaxis & coagulopathies requiring ongoing blood product transfusions.        Charla was transferred to CTICU on 3/27/24 following right femoral VA ECMO placement due to worsening cardiogenic shock and multi-organ failure despite Impella 5.5 support and multiple inotropes. She was listed Status 1 for heart/kidney on 4/2, however was deactivated (status 7) due to c/f sepsis. Relisted Status 1 4/16. She remains critically ill in the  CTICU on MCS with ECMO and an Impella as well as CVVH.       NEURO:  She vacillates between being neuro intact with intermittent delirium and anxiety though is much improved over last few days. Insomnia supported with multimodals and REST protocol.  CAM negative this AM.  Sitting at side of bed and standing with PT- max assist to get into position but then min assist to maintain. Increased anxiety. Precedex nightly for sleep and intermittently throughout day with anxiety attacks. Additional AM dose of seroquel this AM with agitation.  -->  - Serial neuro and pain assessments  - PRN tylenol  - PRN dilaudid  - Continue lidoderm patch for back pain  - PRN oxy 5mg Q6   - Continue melatonin 10 mg HS   - Stop seroquel  - PT/OT Consult; mobilize as able  - CAM ICU score qshift. Sleep/wake cycle hygiene  - REST protocol (CXR and labs after 6am)      ENT: Multiple episodes of epistaxis with interventions by ENT. Most recently in OR 3/27 with L nare packed. Packing removed 4/1. -->  - appreciate ENT recs  - PRN ocean spray and mupiricin for dry nasal passages  - PRN afrin      Cardiac: Patient has a history of heart transplant in March of 2022 with primary graft dysfunction treated for acute cellular and antibody rejection without improvement with negative biopsy with multiple MCS devices for refractory cardiogenic shock (right femoral IABP: 2/18/24 - 3/1/24; Left femoral VA ECMO: 2/19/24 - 2/29/24; Right axillary impella 5.5: 3/1/24 - ; 2nd right femoral VA ECMO: 3/27/24 - ). Elevated LDH and difficulty with flows despite multiple attempts at repositioning Impella previously.   Current ECMO settings ~3.7L with FiO2 95% and sweep 4; Impella 5.5 P4 with flows 1.5 LPM. LDH increasing. Remains sinus tachy and normotensive. -->  - Maintain goal MAP 70-90  - Continue full BiV support with ECMO  - Continue Impella at P4  - Holding rosuvastatin with mild uptrending in LFT  - Trend daily LDH   - Now status 7  - Hold home amlodipine      Vascular: 3/27 arterial duplex with proximal SFA occlusion with collateral flow. C/f LLE ischemia resolved. Feet warm with intact motor exam. -->     PULM: She has been intubated multiple times throughout hospital course for procedures. Acute respiratory failure persisting due to acute pulmonary edema and CT chest 4/9 with severe multifocal PNA. Cxr improving with some cardiogenic pulmonary edema. Gas exchange augmented by VA ECMO,4/17 intubated for acute hypoxic respiratory failure and distress. Currently on APRV PH 30, 80%. CXR with pulmonary edema but much improved from yesterday. Sweep is at 5  -->  - CXR daily  - Wean FIO2 first to 60% then decrease PH  - Adjust sweep for pH 7.3 - 7.5  - Maintain SPO2 > 92%     GI: History of gastric bypass and MMF colitis. Shock liver originally resolved; most recently elevated r/t likely congestive hepatopathy that is improving on ECMO. Abdominal pain improved with reduced myfortic dosing. Previous c/f GI bleed, likely blood from epistaxis; no current evidence of GI bleeding. H pylori negative, fecal calprotectin (elevated at 380), fecal lactoferrin (Positive 03/23/24). Poor nutritional intake leading to vitamin K deficiency and elevated INR s/p Dobhoff placement by ENT 4/8 under fiberoptic visualization. Despite education on benefits of maintaining, NG tube removed 4/15 per patient request. Very poor nutritional intake. Abdominal pain this morning, mild tenderness in left lower quadrant. Dilated loop c/f ileus on KUB. Transiently elevated lactate. -->  - Continue calcitriol 0.5mg daily, multivitamin, calcium carbonate 1,250mg BID  - Continue PPI daily  - Patient with poor PO. On TPN hold lipids since on prop.   - Jaundiced Increasing LFTs. Follow up ammonia level with increasing agitation.   - daily miralax     :  No history, baseline Cr 1.2-1.3. HIRAM originally on CVVH then with renal recovery but then again worsened and now dialysis dependent and failed diuretic  challenges. Euvolemic. Mild hypophosphatemia.  -->  - RFP as clinically indicated, replete electrolytes per CTICU protocol.   - Continue CVVH with goal negative 500 cc  - Nephrology Following     ENDO: History of hypothyroidism and prediabetes. TSH elevated originally to malabsorption then with dosing adjusted by endo; TSH (3/17): 20.74, T4 1.11, T3: 1.4, improved 4/1; TSH 10.13, T4 0.70. 4/8: TSH 19.48, T3 0.8, T4 0.68. Steroid induced hyperglycemia acceptable glycemic control on SSI. -->  - Maintain BG <180 with hypoglycemia protocol  - Continue SSI #1 AC/HS   - Continue Synthroid 75 mcg IV daily with concern for malabsorption  - Recheck TFT's 4/23 (ordered)   - Appreciate Endocrine Consult      HEME: History of iron deficiency anemia and remote DVT; again with acute DVT LIJ and SVT left cephalic vein and right cephalic vein. Acute blood loss anemia with repeated transfusions with significant hemolysis but no evidence of active bleeding; last received RBC 4/5. Stable thrombocytopenia persisting; last PF4 negative 3/30. No recent transfusion requirement. Coagulopathy r/t likely poor nutrition resolved with vit K x 3 doses (last 4/10).   -->  - Continue ferrous sulfate daily  - Avoid unnecessarily transfusing,   - Hold heparin due to thrombocytopenia  - Sodium bicarb through heparin purge  - Trend coags daily  - SCDs for DVT prophylaxis  - Aranesp every 2 weeks  - Last type and screen: 4/16     Rejection/prophylaxis: S/p heart transplant 3/31/2022. Induction basiliximab. Donor HCV -, toxo -/-, CMV -/+. Mild ACR with +CD4 and negative HLAs however clinical presentation concern for acute cellular vs AMR rejection/stuttering rejection completed courses of thymo/PLEX/IVIG without clinical improvement.  With consideration to progressive graft failure and concern for infection limiting re-transplant at this time, we are holding tacro and myfortic (4/9 - ).  - Steroids: Continue PO prednisone 10 mg daily  - Hold  Tacrolimus: 4.0 mg AM/3.5 mg PM w/ daily levels drawn @ 0600  - Hold Tacrolimus goal troughs: 8-10  - Hold Myfortic acid: 360mg BID (Not starting MMF d/t hx of colitis)  - Antifungals: nystatin 500,000units Q6  - Antivirals: valcyte 450mg Q48hrs  - Anti PCP & Toxoplasmosis: continue SS Bactrim with c/f PNA     ID: C/f previous yeast infection. BC 3/27 NGTD final. MRSA screen negative 3/28. Episode of hypotension on 4/1, pan cultured and empiric Vanco/Zosyn started.  Patient was shivering 4/3, concern for risk of infection. Blood cultures sent 4/3, NGTD. Zosyn (4/1- 4/7) then broadened to Susan (4/7 - 4/15) and vanco (4/6-4/15) and natalie started (4/7 - 4/12). CT chest 4/9 demonstrates severe multifocal PNA. UA culture with enterococcus but difficult to define as infection given anuric state.  Beta-D glucan mildly elevated 195 thought to be 2/2 recent IVIG though could also be indicative of PJP. Persistent leukocytosis without clear source if infection. Cultures negative.  -->  - Trend temp q4h  - Holding antibiotics for now  - Follow up culture results  - ID signed off  -Vanc/susan/vori started 4/17 per ID  linezolid for VRE with ID--> do not treat per ID. Recommending repeat UA but no urine to send.  - PJP negative     Skin: Left groin wound from previous ECMO with wound consulted. Wound cx with NG 3/15.   - Preventative Mepilex dressings in place on sacrum and heels  - Change preventative Mepilex weekly or more frequently as indicated (when moist/soiled)   - Every shift skin assessment per nursing and weekly ICU skin rounds  - Moisture barrier to be applied with christopher care  - Active skin problems addressed with nursing on daily rounds  - wound recs from note 3/15 ordered        Family meeting today to discus [facility]    Proph:  SCDs  PPI     G: Lines  RIJ Trialysis - 4/6  R radial maxwell 4/11  PIV x2    Restraints: Not indicated    Code status: DNR    I personally spent 30 minutes of critical care time directly and  personally managing the patient exclusive of separately billable procedures.     A,B,C,D,E,F,G: reviewed.    A&P reviewed on multidisciplinary critical care rounds. Plan for multidisciplinary discussion today to discuss trajectory.      Dispo: CTICU care for now.    CTICU TEAM PHONE [phone]

## 2024-04-19 NOTE — DISCHARGE SUMMARY
Death Note  - On examination, no palpable pulses, no corneal, oculocephalic reflex. No heart nor lung sounds. Patient declared dead at 1307

## 2024-04-19 NOTE — PROGRESS NOTES
"[facility]  ADVANCED HEART FAILURE AND TRANSPLANT CARDIOLOGY   Charla Bowles/[id number]    Admit Date: 2/15/2024  Hospital Length of Stay: 64   ICU Length of Stay: 22d 16h   Primary Service: CTICU    Charla Bowles is a 34 y.o. female on day 64 of admission presenting with Cardiogenic shock (Multi).    Subjective   JOSE    Objective     Physical Exam  Constitutional:       Appearance: She is ill-appearing and toxic-appearing.      Comments: Sedated on propofol infusion    HENT:      Head: Normocephalic.      Mouth/Throat:      Pharynx: Oropharyngeal exudate (Dried blood within oral cavity) present. No posterior oropharyngeal erythema.   Eyes:      General: Scleral icterus present.   Neck:      Vascular: No JVD.   Cardiovascular:      Rate and Rhythm: Normal rate.      Comments: Right axillary impella in place ~45cm at hub (no hematoma), Right femoral VA ECMO in place with reperfusion catheter (site appears clean and dry).   Pulmonary:      Effort: No accessory muscle usage or retractions.      Comments: Intubated on ventilator. ET tube with scant bloody sputum.  Musculoskeletal:      Cervical back: Full passive range of motion without pain.   Skin:     General: Skin is dry.      Coloration: Skin is jaundiced.      Findings: Bruising and lesion (Left groin wound with slough.) present. No erythema, laceration, rash or wound.   Neurological:      Comments: JOSE due to sedation       Last Recorded Vitals  Blood pressure (!) 103/93, pulse 83, temperature 36.8 °C (98.2 °F), resp. rate 19, height 1.549 m (5' 0.98\"), weight 78.5 kg (173 lb 1 oz), SpO2 (!) 88%.  Intake/Output last 3 Shifts:  I/O last 3 completed shifts:  In: 3292.2 (41.9 mL/kg) [I.V.:1308.1 (16.7 mL/kg); IV Piggyback:800]  Out: 3417 (43.5 mL/kg) [Other:3417]  Weight: 78.5 kg     Relevant Results  Scheduled medications  calcium carbonate-vitamin D3, 1 tablet, oral, Daily  cyanocobalamin, 500 mcg, oral, " Daily  darbepoetin floyd, 100 mcg, subcutaneous, q14 days  ferrous sulfate (325 mg ferrous sulfate), 65 mg of iron, oral, Daily with breakfast  folic acid, 1 mg, oral, Daily  insulin lispro, 0-15 Units, subcutaneous, TID with meals  levothyroxine, 90 mcg, intravenous, q24h TRICIA  lidocaine, 1 patch, transdermal, Daily  melatonin, 10 mg, oral, Daily  meropenem, 1 g, intravenous, q12h  multivitamin with minerals, 1 tablet, oral, Daily  mupirocin, 1 Application, Each Nostril, BID  nystatin, 5 mL, Swish & Swallow, q6h  pantoprazole, 40 mg, intravenous, Daily  polyethylene glycol, 17 g, oral, Daily  predniSONE, 10 mg, oral, Daily  sennosides-docusate sodium, 2 tablet, oral, BID  valGANciclovir, 450 mg, oral, q48h  vancomycin, 750 mg, intravenous, q12h  voriconazole, 4 mg/kg, intravenous, q12h      Continuous medications  dexmedeTOMIDine, 0.1-1.5 mcg/kg/hr, Last Rate: Stopped (04/17/24 1615)  fentaNYL,  mcg/hr, Last Rate: 25 mcg/hr (04/19/24 1135)  lactated Ringer's, 5 mL/hr, Last Rate: 5 mL/hr (04/19/24 1100)  propofol, 5-50 mcg/kg/min (Dosing Weight), Last Rate: 40 mcg/kg/min (04/19/24 1135)  sodium bicarbonate infusion Impella Purge 25 mEq/1000 mL D5W, 10 mL/hr, Last Rate: 10 mL/hr (04/19/24 1100)      PRN medications  PRN medications: acetaminophen **OR** [DISCONTINUED] acetaminophen, alteplase, bisacodyl, calcium gluconate, calcium gluconate, dextrose **OR** glucagon, fentaNYL, hydrALAZINE, HYDROmorphone, ipratropium-albuteroL, LORazepam, artificial tears, magnesium sulfate, magnesium sulfate, microfibrllar collagen, mupirocin, naloxone, ondansetron, oxyCODONE, oxygen, sodium chloride, vancomycin     Results for orders placed or performed during the hospital encounter of 02/15/24 (from the past 24 hour(s))   Comprehensive metabolic panel   Result Value Ref Range    Glucose 344 (H) 74 - 99 mg/dL    Sodium 134 (L) 136 - 145 mmol/L    Potassium 3.3 (L) 3.5 - 5.3 mmol/L    Chloride 98 98 - 107 mmol/L    Bicarbonate 21  21 - 32 mmol/L    Anion Gap 18 10 - 20 mmol/L    Urea Nitrogen 27 (H) 6 - 23 mg/dL    Creatinine 0.83 0.50 - 1.05 mg/dL    eGFR >90 >60 mL/min/1.73m*2    Calcium 7.8 (L) 8.6 - 10.6 mg/dL    Albumin 3.4 3.4 - 5.0 g/dL    Alkaline Phosphatase 164 (H) 33 - 110 U/L    Total Protein 4.7 (L) 6.4 - 8.2 g/dL     (H) 9 - 39 U/L    Bilirubin, Total 3.5 (H) 0.0 - 1.2 mg/dL    ALT 26 7 - 45 U/L   Magnesium   Result Value Ref Range    Magnesium 2.33 1.60 - 2.40 mg/dL   Calcium, ionized   Result Value Ref Range    POCT Calcium, Ionized 1.06 (L) 1.1 - 1.33 mmol/L   Phosphorus   Result Value Ref Range    Phosphorus <1.0 (LL) 2.5 - 4.9 mg/dL   CBC   Result Value Ref Range    WBC 13.6 (H) 4.4 - 11.3 x10*3/uL    nRBC 21.4 (H) 0.0 - 0.0 /100 WBCs    RBC 2.11 (L) 4.00 - 5.20 x10*6/uL    Hemoglobin 6.7 (L) 12.0 - 16.0 g/dL    Hematocrit 17.7 (L) 36.0 - 46.0 %    MCV 84 80 - 100 fL    MCH 31.8 26.0 - 34.0 pg    MCHC 37.9 (H) 32.0 - 36.0 g/dL    RDW 17.2 (H) 11.5 - 14.5 %    Platelets 54 (L) 150 - 450 x10*3/uL   POCT GLUCOSE   Result Value Ref Range    POCT Glucose 207 (H) 74 - 99 mg/dL     *Note: Due to a large number of results and/or encounters for the requested time period, some results have not been displayed. A complete set of results can be found in Results Review.       Malnutrition Diagnosis Status: Declining  Malnutrition Diagnosis: Moderate malnutrition related to acute disease or injury  As Evidenced by: pt's reported intake likely meeting <75% of estimated needs for at least 7 days, previous 5% weight loss in 1 month, LOS 42.  I agree with the dietitian's malnutrition diagnosis.      Assessment/Plan   Ms. Yimi Bowles is a 34F with a PMHx sig for stage D HFrEF (PPCM) s/p ICD s/p CardioMEMs, hypothyroidism 2/2 thyroidectomy, obesity s/p gastric bypass (2017), and SLE who is s/p OHT (3/31/2022) with a post-OHT course complicated by RIJ/RUE DVTs, leukopenia, left groin seroma, and asymptomatic 2R ACR (11/2022) treated with  steroids, s/p total hysterectomy (2023), Flu A (1/2024).     Originally presented to [facility] ED on 2/15/24 with complaints of N/V x 3 days and inability to keep medications down. Found to have acute systolic heart failure with primary graft failure and cardiogenic shock. Treated for suspected acute cellular vs. acute antibody mediated rejection; however, multiple cardiac biopsies negative for pathology indicating rejection or other causes of graft failure. Course has been complicated by multiple MCS devices for refractory cardiogenic shock (right femoral IABP: 2/18/24 - 3/1/24, Left femoral VA ECMO: 2/19/24 - 2/29/24, Right axillary impella 5.5: 3/1/24 - remains in place, 2nd right femoral VA ECMO: 3/27/24 - remains in place), ARF requiring RRT, acute liver injury, severe hemolysis 2/2 to impella malposition, mild CMV viremia, bilateral provoked IJ DVTs, multiple episodes of epistaxis & oral bleeding, coagulopathies requiring ongoing blood product transfusions, HCAP, ARDS & acute mental status change on 4/18/24 requiring intubation for airway protection.       Transferred to CTICU on 3/27/24 following right femoral VA ECMO placement due to worsening cardiogenic shock and multi-organ failure despite Impella 5.5 support and multiple inotropes. Recently status 1 for duel organ heart-kidney transplant but status decreased to 7 on 4/17/24 due to chest x-ray concerning for ARDs 2/2 to pulmonary overload vs. Infectious process. Later that day she developed acute oral bleeding followed by acute mental status change requiring intubation for airway protection. At that time she was delisted from [facility] for organ transplantation. Family meeting held on 4/18/24 resulting in code status change to DNAR-CCA and withdrawal of care on 4/19/24.      #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  #Cardiac allograft failure of unknown etiology   #Multiorgan failure     Recommendations:  - Agree with end of life care and management  - Appreciate of CTICU team and their  medical management during this time      Heart Transplant Team:   Epic Secure Chat  [phone] during day shift  [id number] during night shift     Keith Jain, APRN-CNP     Endometriosis    Miscarriage    Ovarian cyst    Urinary tract infection associated with cystostomy catheter, sequela

## 2024-04-19 NOTE — PROGRESS NOTES
"        INPATIENT TRANSPLANT NEPHROLOGY PROGRESS NOTE          REASON FOR CONSULT:  Immunosuppressive medication management and nephrology related issues.    SUBJECTION:  CVVH Procedure note    Overnight Events: Family meeting yesterday with decision to transition to DNRCCA with plans for comfort care following gathering of family     PHYCISCAL EXAMINATION:    Visit Vitals  BP (!) 103/93 Comment: post: 95/82   Pulse 83   Temp 36.8 °C (98.2 °F)   Resp 19   Ht 1.549 m (5' 0.98\")   Wt 78.5 kg (173 lb 1 oz)   SpO2 (!) 88%   BMI 32.72 kg/m²   OB Status Hysterectomy   Smoking Status Never   BSA 1.84 m²        04/17 1900 - 04/19 0659  In: 3292.2 [I.V.:1308.1]  Out: 3417      Weight change:   Neuro: Propofol, prn hydromorphone. Can dc seroquel. Pt RASS 0, -2.   Cardiac: BiV failure from acute rejection of heart transplant. On VA-ECMO, with Impella 5.5. 3.5 L ECMo Flow, Impella P4 with 1.5 L flow. Pt with significant hemolysis, and impella a bit high. Given bleeding yesterday, off of heparin infusion. Pt is no longer a transplant candidate and will advocate for comfort care               Pacer: None  Pulmonary: Reintubated yesterday for aspiration, poor mental status. Pt XR still with bilateral infiltrates, though much improved. ABG has also improved.   Vent Mode: Airway pressure release ventilation  FiO2 (%):  [50 %-100 %] 80 %  MAP (cm H2O):  [27-30] 27  Gastrointestinal: Aspiration of oral content yesterday, placed OGT today. Belly is soft. PPI. Bowel regimen  Renal: on CVVH, though pigtail on ECMO clotted, will need to remove fluid via trialysis to assist with negative 1 L fluid balance  Endocrine: Synthroid for hypothyroidism  Hematology: H/H stable, Plts 43. No systemic heparin, nor any signs of bleeding.   Immunosuppression: Pred 10. Off all other antirejection medications  Infectious Disease: Vanc/Susan/Vori started given decompensation yesterday. No growth on recent culture data  Musculoskeletal: No issues   "   Lines/Tubes/Drains: Fem Fem VA ECMO, axillary impella, trialysis, maxwell    MEDICATION LIST: REVIEWED    calcium carbonate-vitamin D3, 1 tablet, Daily  cyanocobalamin, 500 mcg, Daily  darbepoetin floyd, 100 mcg, q14 days  ferrous sulfate (325 mg ferrous sulfate), 65 mg of iron, Daily with breakfast  folic acid, 1 mg, Daily  insulin lispro, 0-15 Units, TID with meals  levothyroxine, 90 mcg, q24h TRICIA  lidocaine, 1 patch, Daily  melatonin, 10 mg, Daily  meropenem, 1 g, q12h  multivitamin with minerals, 1 tablet, Daily  mupirocin, 1 Application, BID  nystatin, 5 mL, q6h  pantoprazole, 40 mg, Daily  polyethylene glycol, 17 g, Daily  predniSONE, 10 mg, Daily  sennosides-docusate sodium, 2 tablet, BID  valGANciclovir, 450 mg, q48h  vancomycin, 750 mg, q12h  voriconazole, 4 mg/kg, q12h      dexmedeTOMIDine, Last Rate: Stopped (04/17/24 1615)  fentaNYL, Last Rate: 25 mcg/hr (04/19/24 1135)  lactated Ringer's, Last Rate: 5 mL/hr (04/19/24 1100)  propofol, Last Rate: 40 mcg/kg/min (04/19/24 1135)  sodium bicarbonate infusion Impella Purge 25 mEq/1000 mL D5W, Last Rate: 10 mL/hr (04/19/24 1100)      acetaminophen, 650 mg, q6h PRN  alteplase, 2 mg, PRN  bisacodyl, 10 mg, Daily PRN  calcium gluconate, 1 g, q6h PRN  calcium gluconate, 2 g, q6h PRN  dextrose, 25 g, q15 min PRN   Or  glucagon, 1 mg, q15 min PRN  fentaNYL, 25 mcg, q2h PRN  hydrALAZINE, 5 mg, q4h PRN  HYDROmorphone, 0.2 mg, q3h PRN  ipratropium-albuteroL, 3 mL, q6h PRN  LORazepam, 0.5 mg, q4h PRN  artificial tears, 2 drop, PRN  magnesium sulfate, 2 g, q6h PRN  magnesium sulfate, 4 g, q6h PRN  microfibrllar collagen, , PRN  mupirocin, , BID PRN  naloxone, 0.2 mg, q5 min PRN  ondansetron, 4 mg, q4h PRN  oxyCODONE, 5 mg, q6h PRN  oxygen, , Continuous PRN - O2/gases  sodium chloride, 1 spray, 4x daily PRN  vancomycin, , Daily PRN        ALLERGY:  Allergies   Allergen Reactions    Dapagliflozin GI bleeding and Bleeding     UTI    Urinary tract infection    Empagliflozin  Unknown    Myfortic [Mycophenolate Sodium] GI Upset    Topiramate Nausea Only and Nausea/vomiting    Latex Rash       LABS:  Results for orders placed or performed during the hospital encounter of 02/15/24 (from the past 24 hour(s))   Comprehensive metabolic panel   Result Value Ref Range    Glucose 344 (H) 74 - 99 mg/dL    Sodium 134 (L) 136 - 145 mmol/L    Potassium 3.3 (L) 3.5 - 5.3 mmol/L    Chloride 98 98 - 107 mmol/L    Bicarbonate 21 21 - 32 mmol/L    Anion Gap 18 10 - 20 mmol/L    Urea Nitrogen 27 (H) 6 - 23 mg/dL    Creatinine 0.83 0.50 - 1.05 mg/dL    eGFR >90 >60 mL/min/1.73m*2    Calcium 7.8 (L) 8.6 - 10.6 mg/dL    Albumin 3.4 3.4 - 5.0 g/dL    Alkaline Phosphatase 164 (H) 33 - 110 U/L    Total Protein 4.7 (L) 6.4 - 8.2 g/dL     (H) 9 - 39 U/L    Bilirubin, Total 3.5 (H) 0.0 - 1.2 mg/dL    ALT 26 7 - 45 U/L   Magnesium   Result Value Ref Range    Magnesium 2.33 1.60 - 2.40 mg/dL   Calcium, ionized   Result Value Ref Range    POCT Calcium, Ionized 1.06 (L) 1.1 - 1.33 mmol/L   Phosphorus   Result Value Ref Range    Phosphorus <1.0 (LL) 2.5 - 4.9 mg/dL   CBC   Result Value Ref Range    WBC 13.6 (H) 4.4 - 11.3 x10*3/uL    nRBC 21.4 (H) 0.0 - 0.0 /100 WBCs    RBC 2.11 (L) 4.00 - 5.20 x10*6/uL    Hemoglobin 6.7 (L) 12.0 - 16.0 g/dL    Hematocrit 17.7 (L) 36.0 - 46.0 %    MCV 84 80 - 100 fL    MCH 31.8 26.0 - 34.0 pg    MCHC 37.9 (H) 32.0 - 36.0 g/dL    RDW 17.2 (H) 11.5 - 14.5 %    Platelets 54 (L) 150 - 450 x10*3/uL   POCT GLUCOSE   Result Value Ref Range    POCT Glucose 207 (H) 74 - 99 mg/dL     *Note: Due to a large number of results and/or encounters for the requested time period, some results have not been displayed. A complete set of results can be found in Results Review.        ASSESSMENT AND PLAN:    Ms. Yimi Bowles is a 34 y.o. female who underwent a kidney transplant surgery  on 3/31/2022 (Heart).    Principal Problem:    Cardiogenic shock (CMS/HCC)  Active Problems:    Heart transplant  recipient (CMS/[facility])    ESRD (end stage renal disease) on dialysis (CMS/[facility])    HIRAM (acute kidney injury) (CMS/[facility])    Acute passive congestion of liver    Hyponatremia    Iron deficiency anemia    Abdominal pain    Cardiac transplant rejection (CMS/[facility])    Acute combined systolic (congestive) and diastolic (congestive) heart failure (CMS/[facility])      CRRT Management Note:    Stop CVVH once comfort care    - Patient was seen and examined while on CVVH   - Lab was reviewed  - CVVH order was reviewed  -Discussed with primary team  -Discussed with RN   - Will continue CVVH for now  -Daily evaluation for indications for CVVH and will convert it to regular IHD once the patient is more hemodynamically stable.      * Case was discussed with primary team.  For questions, please contact transplant nephrology page x [phone]    Russ Bergeron    Transplant Nephrologist

## 2024-04-19 NOTE — PROGRESS NOTES
Charla Bowles is a 34 y.o. female on day 64 of admission presenting with Cardiogenic shock (Multi).    Patient requires ECMO support. Drainage cannula site, cannula, pump, oxygenator, return cannula and return cannula site clinically inspected. RPM and sweep reviewed and adjusted appropriate to clinical context. Anticoagulation status and intensity discussed. Plan for weaning, next clinical steps discussed with team and family. Discussed with cardiothoracic surgeon, perfusion, palliative care and physical/occupational therapy    ECMO Indication: Rejection of heart transplant leading to BiV failure  ECMO Cannula Configuration: Fem Fem VA  ECMO circuit run from drainage cannula to pump to oxygenator to return cannula: Yes  Presence of cannula bleeding: No  Presence of oxygenator clot: No  Pre/post PaO2 adequate: Yes  LV Unloading/Pulse Pressure: Impella  Distal Perfusion: Yes  Anticoagulation Plan/Monitoring: Off heparin given goals of care, previous bleeding  Mobility Plan/Candidacy: No  Path to decannulation/anticipated decannulation date: Compassionate decannulation later today  ECMO:     Most Recent Range Past 24hrs   Flow 3.1 Patient Flow (L/min)  Min: 2.97  Max: 3.88   Speed 3005 Circuit Flow (RPM)  Min: 3005  Max: 3400   Sweep 5 Sweep Gas Flow Rate (L/min)  Min: 5  Max: 5       Adult Clinimix Parenteral Nutrition, 30 mL/hr, Last Rate: 30 mL/hr (04/19/24 0700)  dexmedeTOMIDine, 0.1-1.5 mcg/kg/hr, Last Rate: Stopped (04/17/24 1615)  lactated Ringer's, 5 mL/hr, Last Rate: 5 mL/hr (04/19/24 0700)  PrismaSol 4/2.5, 2,400 mL/hr, Last Rate: 2,400 mL/hr (04/13/24 1700)  propofol, 5-50 mcg/kg/min (Dosing Weight), Last Rate: 38.487 mcg/kg/min (04/19/24 1000)  sodium bicarbonate infusion Impella Purge 25 mEq/1000 mL D5W, 10 mL/hr, Last Rate: 10 mL/hr (04/19/24 0700)          Vent Mode: Airway pressure release ventilation  FiO2 (%):  [50 %-60 %] 50 %  MAP (cm H2O):  [25-27] 25              Demetrio Murrell,  MD

## 2024-04-19 NOTE — PROGRESS NOTES
Charla Bowles is a 34 y.o. female on day 64 of admission presenting with Cardiogenic shock (Multi).    I have seen the patient either independently or with an associated resident physician or advanced practice provider    Overnight Events: Family meeting yesterday with decision to transition to DNRCCA with plans for comfort care following gathering of family    Neuro: Sedated with propofol, analgesia with hydromorphone/APAP.   Cardiac: S/p BiV failure from unknown cause of heart graft rejection. Despite VA ECMO/Impella will not be able to tolerate retransplant. Pt currently HDS, NSR   Pacer: None  Pulmonary: CXR improving, continue APRV wean  Vent Mode: Airway pressure release ventilation  FiO2 (%):  [50 %-60 %] 50 %  MAP (cm H2O):  [25-27] 25  Gastrointestinal: PPI, stop TPN given GOC and hyperglycemia  Renal: - 1 L on CVVHDUF, will stop when transitions to comfort care.   Endocrine: Synthroid for hypothyroidism.   Hematology: H/H downtrending, secondary to hemolysis. Will not transfuse given not consistent with goals of care.   Infectious Disease: Vanc/Merto/Vori until code change. Afebrile  Musculoskeletal: No issues    Lines/Tubes/Drains: RIJ MAC, L internal jugular trialysis, A line ,wheatley    Mechanical Circulatory Support: ECMO:     Most Recent Range Past 24hrs   Flow 3.1 Patient Flow (L/min)  Min: 2.97  Max: 3.88   Speed 3005 Circuit Flow (RPM)  Min: 3005  Max: 3400   Sweep 5 Sweep Gas Flow Rate (L/min)  Min: 5  Max: 5     Impella:      Most Recent Range Past 24hrs   Performance Level 3 P Level  Min: 3   Min taken time: 04/19/24 0700  Max: 4   Max taken time: 04/18/24 1200   Flow (L/min) 0.1 Flow (L/min)  Min: 0   Min taken time: 04/19/24 0400  Max: 1.9   Max taken time: 04/18/24 1100   Motor Current 163/114 Motor Current  Min: 160/115   Min taken time: 04/19/24 0600  Max: 228/150   Max taken time: 04/18/24 1100   Placement Signal Yes  Placement OK could not be evaluated. This SmartLink does not work  with rows of the type: Custom List   Purge (mmHg) 400 Purge Pressure (mmHg)  Min: 400   Min taken time: 04/19/24 0700  Max: 578   Max taken time: 04/18/24 1900   Purge rate (mL/hr) 11.3 Purge Rate (mL/hr)  Min: 10.7   Min taken time: 04/19/24 0200  Max: 11.6   Max taken time: 04/19/24 0500     Signal appears well, lower flow than expected      Prophylaxis: PPI, SCDs  Med to Discontinue: Will stop meds when transitions to comfort care    Disposition: CTICU    ABCDEF Checklist  Analgesia: Spontaneous awakening trial to be pursued if clinically appropriate. RASS goal reviewed  Breathing: Spontaneous breathing trial to be pursued if clinically appropriate. Mechanical power of assisted ventilation reviewed  Choice of analgesia/sedation: Analgesic and sedative agents adjusted per clinical context.   Delirium assessed by CAM, will avoid exacerbating factors  Early mobility and exercise: Physical and occupational therapy engaged  Family: Plan of care, overall trajectory of patient shared with family. Questions elicited and answered as appropriate.       Due to the high probability of life threatening clinical decompensation, the patient required critical care time evaluating and managing this patient.  Critical care time included obtaining a history, examining the patient, ordering and reviewing studies, discussing, developing, and implementing a management plan, evaluating the patient's response to treatment, and discussion with other care team providers. I saw and evaluated the patient myself.  Critical care time was performed exclusive of billable procedures.    Critical care time: 45          Demetrio Murrell MD

## 2024-04-19 NOTE — CARE PLAN
Problem: Fall/Injury  Goal: Not fall by end of shift  Outcome: Progressing     Problem: Fall/Injury  Goal: Be free from injury by end of the shift  Outcome: Progressing     Problem: Pain  Goal: My pain/discomfort is manageable  Outcome: Progressing     Problem: Safety - Medical Restraint  Goal: Remains free of injury from restraints (Restraint for Interference with Medical Device)  Outcome: Progressing     Problem: Safety - Medical Restraint  Goal: Free from restraint(s) (Restraint for Interference with Medical Device)  Outcome: Progressing     Problem: Chronic Conditions and Co-morbidities  Goal: Patient's chronic conditions and co-morbidity symptoms are monitored and maintained or improved  Outcome: Progressing

## 2024-04-20 LAB
BACTERIA SPEC RESP CULT: ABNORMAL
BACTERIA SPEC RESP CULT: NORMAL
GRAM STN SPEC: ABNORMAL
GRAM STN SPEC: ABNORMAL
GRAM STN SPEC: NORMAL
GRAM STN SPEC: NORMAL

## 2024-04-20 PROCEDURE — 87070 CULTURE OTHR SPECIMN AEROBIC: CPT | Mod: 59 | Performed by: THORACIC SURGERY (CARDIOTHORACIC VASCULAR SURGERY)

## 2024-04-20 PROCEDURE — 36415 COLL VENOUS BLD VENIPUNCTURE: CPT | Performed by: THORACIC SURGERY (CARDIOTHORACIC VASCULAR SURGERY)

## 2024-04-20 PROCEDURE — 87040 BLOOD CULTURE FOR BACTERIA: CPT | Performed by: THORACIC SURGERY (CARDIOTHORACIC VASCULAR SURGERY)

## 2024-04-22 ENCOUNTER — APPOINTMENT (OUTPATIENT)
Dept: TRANSPLANT | Facility: HOSPITAL | Age: 34
End: 2024-04-22
Payer: MEDICARE

## 2024-04-22 LAB
AUTOPSY REPORT: NORMAL
BACTERIA SPEC CULT: NORMAL
GRAM STN SPEC: NORMAL
GRAM STN SPEC: NORMAL
LAB AP POST MORTEM HOURS: 1.6
PATH REPORT.RELEVANT HX SPEC: NORMAL
PATH REPORT.TOTAL CANCER: NORMAL

## 2024-04-22 NOTE — PROGRESS NOTES
Art Therapy Note    Charla Bowles     Therapy Session  Visit Type: Follow-up visit  Session Start Time: 1458  Session End Time: 1500  Conflict of Service: Asleep  Number of family members present: 4              Treatment/Interventions       Post-assessment  Total Session Time (min): 2 minutes    Narrative  Assessment Detail: Pt was surrounded by family members when ATR attempted session. Staff shared that there would be a family meeting today regarding pt.  Follow-up: ATR will f/u another time, provided pt remains admitted.    Education Documentation  No documentation found.

## 2024-04-22 NOTE — PROGRESS NOTES
"Music Therapy Note    Charla Bowles     Therapy Session  Referral Type: New referral this admission  Visit Type: Follow-up visit  Session Start Time: 1509  Session End Time: 1510  Intervention Delivery: In-person  Conflict of Service: Declined treatment  Family Present for Session: None               Treatment/Interventions       Post-assessment  Total Session Time (min): 1 minutes    Narrative  Assessment Detail: Pt grateful for check-in \"thank you\" but declined \"until I get better\". MT will continue to f/u    Education Documentation  No documentation found.          "

## 2024-04-22 NOTE — SIGNIFICANT EVENT
Death Within 24 Hours of Soft Wrist Restraint Utilization    Death within 24 hours of soft wrist restraint logged at 4/22/2024 at 11:09 AM.    Charity Erickson RN  Date: 4/22/2024  Time: 11:09 AM

## 2024-04-23 LAB
BACTERIA SPEC CULT: ABNORMAL
GRAM STN SPEC: ABNORMAL
GRAM STN SPEC: ABNORMAL

## 2024-04-24 LAB — BACTERIA BLD CULT: NORMAL

## 2024-05-17 ENCOUNTER — SPECIALTY PHARMACY (OUTPATIENT)
Dept: PHARMACY | Facility: CLINIC | Age: 34
End: 2024-05-17

## 2024-05-20 ENCOUNTER — APPOINTMENT (OUTPATIENT)
Dept: TRANSPLANT | Facility: HOSPITAL | Age: 34
End: 2024-05-20
Payer: COMMERCIAL

## 2024-06-27 LAB
LAB AP ANCILLARY STUDIES: NORMAL
LAB AP POST MORTEM HOURS: 18.88
LABORATORY COMMENT REPORT: NORMAL
PATH REPORT.FINAL DX SPEC: NORMAL
PATH REPORT.MICROSCOPIC SPEC OTHER STN: NORMAL
PATH REPORT.RELEVANT HX SPEC: NORMAL
PATH REPORT.TOTAL CANCER: NORMAL
RESIDENT REVIEW: NORMAL
RPT COMMENT SECTION: NORMAL

## 2024-07-29 NOTE — PROGRESS NOTES
Problem: Adult Inpatient Plan of Care  Goal: Plan of Care Review  Outcome: Progressing  Flowsheets (Taken 7/29/2024 0522)  Progress: no change  Outcome Evaluation: Pt admitted to 3S, oriented to room and to call bell, all belongings accounted for. PT AAOx3, disoriented to situation and forgetful at times, VSS on RA, no c/o pain. FSP maintained. See flowsheet for full nursing assessment. Call bell and all needs within reach.  Plan of Care Reviewed With: patient   Occupational Therapy    Occupational Therapy Treatment    Name: Charla Bowles  MRN: [id number]  : 1990  Date: 24  Time Calculation  Start Time: 1237  Stop Time: 1304  Time Calculation (min): 27 min    Assessment:  End of Session Communication: Bedside nurse, Physician  End of Session Patient Position: Bed, 3 rail up, Alarm off, not on at start of session    Plan:  Treatment Interventions: ADL retraining, Functional transfer training, Endurance training, UE strengthening/ROM, Cognitive reorientation, Patient/family training, Equipment evaluation/education, Neuromuscular reeducation, Fine motor coordination activities, Compensatory technique education  OT Frequency: 5 times per week  OT Discharge Recommendations: High intensity level of continued care  Equipment Recommended upon Discharge:  (TBD)  OT Recommended Transfer Status: Assist of 2  OT - OK to Discharge: Yes    Subjective   General:  OT Last Visit  OT Received On: 24  Co-Treatment: PT  Co-Treatment Reason: Pt on ECMO requiring skilled 2 person assist for safe mobility  Prior to Session Communication: Physician, Bedside nurse, PCT/NA/CTA  Patient Position Received: Bed, 3 rail up, Alarm off, not on at start of session  Family/Caregiver Present: No  General Comment: Pt supine in bed upon arrival. Agreeable to participate. VA ECMO flow 3.21 (post 3.09), sweep 3, 90% FiO2. R axillary impella P2. Sites examined pre, during and post mobility. Stable and secure.      Precautions:  Hearing/Visual Limitations: WFL  Medical Precautions: Cardiac precautions, Fall precautions  Precautions Comment: R axillary impella precautions- no pushing/pulling with R UE, No lifting R UE above shoulder height, no R UE humeral EXT past plane of body, R femoral ECMO precautions, MAP 70-90, protective precautions    Vitals:  Vital Signs  Heart Rate: 109 (post: 113)  Resp: (!) 40 (post: 32)  SpO2: 97 %  BP: 94/83 (post: 102/92)    Lines/Tubes/Drains:  ECMO  Cannulation Peripheral Right;Femoral artery;Femoral vein (Active)   Number of days: 12       Arterial Line 03/27/24 Right Radial (Active)   Number of days: 12       Ventricular Assist Device Impella 5.5 (Active)   Number of days: 38       NG/OG/Feeding Tube Nasojejunal Left nostril (Active)   Number of days: 1       Hemodialysis Cath 04/06/24 Triple lumen Right Non-tunneled catheter Jugular (Active)   Number of days: 2       Cognition:  Overall Cognitive Status: Impaired  Orientation Level:  (Oriented x3)  Cognition Comments: Irrational thinking noted throughout session.  Insight: Moderate  Impulsive: Moderately    Pain Assessment:  Pain Assessment  Pain Assessment:  (intermittent back pain reported, not quantified)     Objective   Activities of Daily Living:   Feeding  Feeding Comments: pt able to bring ice chip to mouth with set up assist seated EOB    Grooming  Grooming Where Assessed: Edge of bed  Grooming Comments: while seated EOB, pt washed face with set up assist and min cueing for attention to task     Bed Mobility/Transfers:     Bed Mobility  Bed Mobility: Yes  Bed Mobility 1  Bed Mobility 1: Supine to sitting, Sitting to supine  Level of Assistance 1: Dependent (x2)  Bed Mobility Comments 1: HOB elevated    Transfers  Transfer: Yes  Transfer 1  Transfer From 1: Sit to  Transfer to 1: Stand  Transfer Level of Assistance 1: Maximum assistance (x2 assist)  Trials/Comments 1: x3 trials. Able to acheive ~50% partial stand on trial 2. Achieved <25% partial stand on trials 1 and 3. Draw sheet utilized under hips for trials 2 and 3.    Therapy/Activity:      Therapeutic Activity  Therapeutic Activity Performed: Yes  Therapeutic Activity 1: Pt sat EOB ~15 minutes with CGA for safety. Intermittent cues for safety awareness required 2/2 impulsive lateral leans and graspsing of ECMO and impella tubing.  Therapeutic Activity 2: Pt provided with therapeutic rest breaks between each stand trial.        Outcome  Measures:  [facility] Daily Activity  Putting on and taking off regular lower body clothing: Total  Bathing (including washing, rinsing, drying): A lot  Putting on and taking off regular upper body clothing: A lot  Toileting, which includes using toilet, bedpan or urinal: Total  Taking care of personal grooming such as brushing teeth: A little  Eating Meals: A little  Daily Activity - Total Score: 12     Education Documentation  Body Mechanics, taught by Marcia Thomason OT at 4/9/2024  2:14 PM.  Learner: Patient  Readiness: Acceptance  Method: Explanation, Demonstration  Response: Needs Reinforcement    Precautions, taught by Marcia Thomason OT at 4/9/2024  2:14 PM.  Learner: Patient  Readiness: Acceptance  Method: Explanation, Demonstration  Response: Needs Reinforcement    ADL Training, taught by Marcia Thomason OT at 4/9/2024  2:14 PM.  Learner: Patient  Readiness: Acceptance  Method: Explanation, Demonstration  Response: Needs Reinforcement    Body Mechanics, taught by Marcia Thomason OT at 4/8/2024  4:13 PM.  Learner: Patient  Readiness: Nonacceptance  Method: Explanation, Demonstration  Response: Needs Reinforcement    Precautions, taught by Marcia Thomason OT at 4/8/2024  4:13 PM.  Learner: Patient  Readiness: Nonacceptance  Method: Explanation, Demonstration  Response: Needs Reinforcement    Education Comments  No comments found.        Goals:  Encounter Problems       Encounter Problems (Active)       ADLs       Patient will complete daily grooming tasks in standing with LRAD with supervision level of assistance and PRN adaptive equipment. (Progressing)       Start:  03/01/24    Expected End:  04/16/24               ADLs       Patient with complete lower body dressing with stand by assist level of assistance donning and doffing all LE clothes  with PRN adaptive equipment (Not met)       Start:  03/04/24    Expected End:  03/18/24    Resolved:  04/02/24    Updated  to: Patient with complete UB bathing with stand by assist level of assistance  with PRN adaptive equipment    Update reason: re eval          Patient will complete toileting including hygiene clothing management/hygiene with stand by assist level of assistance. (Not met)       Start:  03/04/24    Expected End:  03/18/24    Resolved:  04/02/24    Updated to: Patient will complete upper body dressing including with mod I level of assistance.    Update reason: re eval         Patient with complete UB bathing with stand by assist level of assistance  with PRN adaptive equipment (Progressing)       Start:  04/02/24    Expected End:  04/16/24                Patient will complete upper body dressing including with mod I level of assistance. (Progressing)       Start:  04/02/24    Expected End:  04/16/24                   BALANCE       Pt will increase dynamic standing tolerance to >10 min with supervision using LRAD during functional mobility/ADLs without LOB in order to improve activity tolerance and balance for self-care tasks.  (Progressing)       Start:  03/01/24    Expected End:  04/16/24               COGNITION/SAFETY       Patient will participate in cognitive activities to demonstrate WFL score on further cognitive assessments, including Medi-Cog, MoCA and remain A&O x3, CAM (-).  (Progressing)       Start:  03/01/24    Expected End:  04/16/24               EXERCISE/STRENGTHENING       Patient will be educated on BUE HEP for increased ADL/IADL performance. (Progressing)       Start:  03/01/24    Expected End:  04/16/24               MOBILITY       Patient will perform Functional mobility max Household distances/Community Distances with supervision level of assistance and least restrictive device in order to improve safety and functional mobility. (Progressing)       Start:  03/01/24    Expected End:  04/16/24 04/09/24 at 2:16 PM   Marcia Thomason, OT   [phone]

## (undated) DEVICE — SUTURE, NUROLON, 0, 18 IN, CT1, DETACHABLE, MULTIPACK, BLACK

## (undated) DEVICE — SUTURE, PROLENE, 4-0, 36 IN, RB1, DA, BLUE

## (undated) DEVICE — ELECTRODE, QUICK-COMBO, EDGE SYSTEM, REDI PACK

## (undated) DEVICE — KIT, CELL SAVER, W/COLLECTION SET, 225ML WASH SET

## (undated) DEVICE — DRAPE, FLUID WARMER

## (undated) DEVICE — SUTURE, VICRYL, 2-0, 27 IN, CT-1, VIOLET

## (undated) DEVICE — COVER, EQUIPMENT, SOLUTION, SLUSH, LF, 122CM X 122 CM, STERILE

## (undated) DEVICE — CONNECTOR, STRAIGHT, 0.375 X 0.375 IN

## (undated) DEVICE — SPONGE, LAP, XRAY DECT, 18IN X 18IN, W/MASTER DMT, STERILE

## (undated) DEVICE — SHUNT, SENSOR

## (undated) DEVICE — CLEANER, ELECTROSURGICAL, TIP, 5 X 5 CM, LF

## (undated) DEVICE — FILTER, IV, BLOOD, MICROAGGREGATE, 40 MIC, RBC TRANSFUSION

## (undated) DEVICE — CONNECTOR, Y, 0.375 X 0.375 X 0.5 IN

## (undated) DEVICE — SPONGE, HEMOSTATIC, CELLULOSE, SURGICEL, 2 X 14 IN

## (undated) DEVICE — KIT, VASCULAR ACCESS, OPUS

## (undated) DEVICE — RETRACTOR, SUTURE, HOLDING, INSERT, OCTOBASE, DISPOSABLE

## (undated) DEVICE — CATHETER, THERMODILUTION, SWAN GANZ, VIP, 5 LUMEN, 7.5 FR, 110 CM

## (undated) DEVICE — TUBING, SUCTION, CONNECTING, NON-CONDUCTIVE, SURE GRIP CONNECTORS, 3/16 X 18 IN, PVC

## (undated) DEVICE — DRESSING, ADHESIVE, ISLAND, TELFA, 2 X 3.75 IN, LF

## (undated) DEVICE — Device

## (undated) DEVICE — SUTURE, PROLENE, 5-0, 36 IN, RB1, DA, BLUE

## (undated) DEVICE — SUTURE, PROLENE, 6-0, 30 IN, C-1, CV-11, BLUE

## (undated) DEVICE — SHEATH, PINNACLE, 10 CM,  5FR INTRODUCER, 5FR DIA, 2.5 CM DIALATOR

## (undated) DEVICE — FORCEPS, BIOPSY, BIPAL 7FR, JAW 2.2/50

## (undated) DEVICE — DRESSING, MEPILEX, BORDER, HEEL, 8.7 X 9.1 IN

## (undated) DEVICE — CANNULA, CARDIAC SUMP

## (undated) DEVICE — SUTURE, PROLENE 4-0, TAPER POINT, SH-1 BLUE 30 INCH

## (undated) DEVICE — CATHETER, ANGIO, IMPULSE, FL4, 6 FR X 100 CM

## (undated) DEVICE — TUBING, SMOKE EVAC, 3/8 X 10 FT

## (undated) DEVICE — DRAPE, SHEET, CARDIOVASCULAR, ANTIMICROBIAL, W/ANESTHESIA SCREEN, IOBAN 2, STERI DRAPE, 107 X 133 IN, DISPOSABLE, FABRIC, BLUE, STERILE

## (undated) DEVICE — DEVICE, DRAIN/TUBE ATTACHMENT, HORIZONTAL, STERILE, LF

## (undated) DEVICE — WASH SET, XTRA, 125ML

## (undated) DEVICE — TRAY, SURESTEP, URINE METER, 14FR, SILICONE

## (undated) DEVICE — SYRINGE, 60 CC, IRRIGATION, BULB, CONTRO-BULB, PAPER POUCH

## (undated) DEVICE — SUTURE, VICRYL 0, TAPER POINT, CT-1 VIOLET 27 INCH

## (undated) DEVICE — APPLICATOR, CHLORAPREP, W/ORANGE TINT, 26ML

## (undated) DEVICE — CATHETER, DRAINAGE, NASOGASTRIC, DOUBLE LUMEN, FUNNEL END, SUMP, SALEM, 18 FR, 48 IN, PVC, STERILE

## (undated) DEVICE — CATHETER, INTRA-AORTIC BALLOON, S PLUS, 7.5 FR 40CC FIBER -OPTIC

## (undated) DEVICE — MICROCOAGULATION TEST, ACT+ TEST CUVETTE

## (undated) DEVICE — GUIDEWIRE, FIXED CORE, SAFE-T-J, 0.025 X 180CML

## (undated) DEVICE — KIT, FAST START

## (undated) DEVICE — COSEAL SURGICAL SEALANT 8ML

## (undated) DEVICE — COVER, CART, 45 X 27 X 48 IN, CLEAR

## (undated) DEVICE — PERFUSION SERVICES

## (undated) DEVICE — CLIPPER, SURGICAL BLADE ASSEMBLY, SPECIALITY, SINGLE USE

## (undated) DEVICE — DRESSING, MEPILEX, BORDER, SACRUM, 8.7 X 9.8 IN

## (undated) DEVICE — SHEATH, PINNACLE, 10 CM,  6FR INTRODUCER, 6FR DIA, 2.5 CM DIALATOR

## (undated) DEVICE — GOWN, SURGICAL, SMARTGOWN, XLARGE, STERILE

## (undated) DEVICE — GUIDEWIRE, AMPLATZ SUPER STIFF, STR, .035 IN X 75CM

## (undated) DEVICE — YANKAUER, RIGID, STRAIGHT, OPEN TIP, NO VENT

## (undated) DEVICE — LOOP, VESSEL, MAXI, RED

## (undated) DEVICE — SHEATH, PINNACLE, 10 CM,  7FR INTRODUCER, 7FR DIA, 2.5 CM DIALATOR

## (undated) DEVICE — CLIPPER, SURGICAL BLADE ASSEMBLY, GENERAL PURPOSE, SINGLE USE

## (undated) DEVICE — TUBING, SUCTION, CONNECTING, STERILE 0.25 X 120 IN., LF

## (undated) DEVICE — ATS SUCTION LINE

## (undated) DEVICE — OXYGENATOR FX 15, W/HR, ARTERIAL FILTER

## (undated) DEVICE — SUTURE, PROLENE, 5-0, 30 IN, RB2, DA, BLUE

## (undated) DEVICE — ACCESS KIT, S-MAK MINI, 4FR 10CM 0.018IN 40CM, NT/PT, ECHO ENHANCE NEEDLE

## (undated) DEVICE — SPONGE, GAUZE, XRAY DECT, 16 PLY, 4 X 4, W/MASTER DMT,STERILE

## (undated) DEVICE — ACCESS KIT, MINI MAK, 4 FR X 10CM, 0.018 X 40CM, NITINOL/PLATINUM, STD NEEDLE

## (undated) DEVICE — KIT, COLLECTION, CARDIO

## (undated) DEVICE — CATHETER, SUCTION, SAFE-T-VAC VALVE, COIL PACKED, 14 FR

## (undated) DEVICE — TIP, SUCTION, YANKAUER, FLEXIBLE

## (undated) DEVICE — GUIDEWIRE, INQWIRE, 3MM J, .035, 150

## (undated) DEVICE — DRAPE, SHEET, FAN FOLDED, HALF, 44 X 58 IN, DISPOSABLE, LF, STERILE

## (undated) DEVICE — INSERT, CLAMP, SURGICAL, SOFT/TRACTION, STEALTH, 1 MM

## (undated) DEVICE — SHEATH, INTRODUCER, 10CM, 8FR, R/O RADIOPAQUE MARKER, 8FR DIA, 2.5 CM DILATOR.

## (undated) DEVICE — SUTURE, SILK, 1, 30 IN, LABYRINTH, BLACK

## (undated) DEVICE — KIT, TOURNIQUET, 7"

## (undated) DEVICE — SUTURE, PROLENE, 6-0, 30 IN, RB-2, BLUE

## (undated) DEVICE — CLOSURE DEVICE, VASCULAR, ANGIO-SEAL, VIP, 8FR

## (undated) DEVICE — DRESSING, DRAIN SPONGE, EXCILON AMD, 4X4 IN, 6 PLY, ST

## (undated) DEVICE — MAYO TRAY, SMALL

## (undated) DEVICE — CATHETER, ANGIO, IMPULSE, FR4, 6 FR X 100 CM

## (undated) DEVICE — CONNECTOR, STRAIGHT, 0.5 X 0.375 IN

## (undated) DEVICE — WASH SET, XTRA, 225ML

## (undated) DEVICE — OXYGENATOR FX 25, W/HR, ARTERIAL FILTER

## (undated) DEVICE — MANIFOLD, 4 PORT NEPTUNE STANDARD

## (undated) DEVICE — SPONGE, HEMOSTAT, SURGICEL FIBRILLAR, ABS, 4 X 4, LF

## (undated) DEVICE — DRAPE, PAD, INSTRUMENT, MAGNETIC, MEDIUM, 10 X 16 IN, DISPOSABLE

## (undated) DEVICE — LOOP, VESSEL, MAXI, BLUE

## (undated) DEVICE — SUTURE, VICRYL, 4-0, 18 IN, UNDYED BR PS-2

## (undated) DEVICE — CLOSURE DEVICE, VASCULAR, MANTA, 18FR

## (undated) DEVICE — INTRODUCER KIT, HEMOSTASIS, VALVE/SIDEPORT, W/GUIDEWIRE, 0.035 IN, 8.5 FR, 10 CM, LF

## (undated) DEVICE — MARKER, SKIN, DUAL TIP INK W/9 LABEL AND REMOVABLE TIME OUT SLEEVE

## (undated) DEVICE — DRAPE, INSTRUMENT, W/POUCH, STERI DRAPE, 7 X 11 IN, DISPOSABLE, STERILE

## (undated) DEVICE — DRESSING, ISLAND, TELFA, 4 X 5 IN

## (undated) DEVICE — MICRO KIT, 5FR X 10CM,  0.018IN, W/40CM NI/AU TIP, ECHO

## (undated) DEVICE — SUTURE, PROLENE, 3-0, 36 IN, SH, DA, BLUE